# Patient Record
Sex: FEMALE | Race: WHITE | Employment: OTHER | URBAN - METROPOLITAN AREA
[De-identification: names, ages, dates, MRNs, and addresses within clinical notes are randomized per-mention and may not be internally consistent; named-entity substitution may affect disease eponyms.]

---

## 2016-05-23 LAB — PHQ9 SCORE: 3

## 2017-01-27 ENCOUNTER — TRANSFERRED RECORDS (OUTPATIENT)
Dept: HEALTH INFORMATION MANAGEMENT | Facility: CLINIC | Age: 67
End: 2017-01-27

## 2017-02-07 ENCOUNTER — OFFICE VISIT (OUTPATIENT)
Dept: FAMILY MEDICINE | Facility: CLINIC | Age: 67
End: 2017-02-07
Payer: COMMERCIAL

## 2017-02-07 ENCOUNTER — RADIANT APPOINTMENT (OUTPATIENT)
Dept: GENERAL RADIOLOGY | Facility: CLINIC | Age: 67
End: 2017-02-07
Attending: PHYSICIAN ASSISTANT
Payer: COMMERCIAL

## 2017-02-07 VITALS
RESPIRATION RATE: 15 BRPM | BODY MASS INDEX: 24.32 KG/M2 | TEMPERATURE: 97.7 F | HEART RATE: 76 BPM | SYSTOLIC BLOOD PRESSURE: 115 MMHG | HEIGHT: 65 IN | OXYGEN SATURATION: 97 % | DIASTOLIC BLOOD PRESSURE: 70 MMHG | WEIGHT: 146 LBS

## 2017-02-07 DIAGNOSIS — S92.505D CLOSED NONDISPLACED FRACTURE OF PHALANX OF LESSER TOE OF LEFT FOOT WITH ROUTINE HEALING, UNSPECIFIED PHALANX, SUBSEQUENT ENCOUNTER: Primary | ICD-10-CM

## 2017-02-07 PROCEDURE — 73660 X-RAY EXAM OF TOE(S): CPT | Mod: LT

## 2017-02-07 PROCEDURE — 99213 OFFICE O/P EST LOW 20 MIN: CPT | Performed by: PHYSICIAN ASSISTANT

## 2017-02-07 RX ORDER — FLUTICASONE PROPIONATE 50 MCG
SPRAY, SUSPENSION (ML) NASAL
Refills: 11 | COMMUNITY
Start: 2016-06-09 | End: 2017-04-27

## 2017-02-07 ASSESSMENT — ANXIETY QUESTIONNAIRES
IF YOU CHECKED OFF ANY PROBLEMS ON THIS QUESTIONNAIRE, HOW DIFFICULT HAVE THESE PROBLEMS MADE IT FOR YOU TO DO YOUR WORK, TAKE CARE OF THINGS AT HOME, OR GET ALONG WITH OTHER PEOPLE: NOT DIFFICULT AT ALL
GAD7 TOTAL SCORE: 0
6. BECOMING EASILY ANNOYED OR IRRITABLE: NOT AT ALL
1. FEELING NERVOUS, ANXIOUS, OR ON EDGE: NOT AT ALL
2. NOT BEING ABLE TO STOP OR CONTROL WORRYING: NOT AT ALL
3. WORRYING TOO MUCH ABOUT DIFFERENT THINGS: NOT AT ALL
5. BEING SO RESTLESS THAT IT IS HARD TO SIT STILL: NOT AT ALL
7. FEELING AFRAID AS IF SOMETHING AWFUL MIGHT HAPPEN: NOT AT ALL

## 2017-02-07 ASSESSMENT — PATIENT HEALTH QUESTIONNAIRE - PHQ9: 5. POOR APPETITE OR OVEREATING: NOT AT ALL

## 2017-02-07 NOTE — PROGRESS NOTES
SUBJECTIVE:                                                    Angeli Jacobson is a 66 year old female who presents to clinic today for the following health issues:      Patient states she broke her toe about a month ago. Patient states the swelling has not gone down,and is wondering if it is healing. Patient was in [place], when 4 weeks ago, she caught her left little toe on the corner of the coffee table. X-ray showed a nondisplaced fracture of distal phalanx of left 5th toe.   Patient has been doing gloria tape and placed in flat walking shoe.  Toe is still swollen and intermittently painful.         Problem list and histories reviewed & adjusted, as indicated.  Additional history: as documented    Patient Active Problem List   Diagnosis     Pain in joint, multiple sites     MULTINODUL GOITER     Hearing loss     Hyperlipidemia LDL goal <160     Osteoporosis     Cervicalgia     Major depressive disorder, recurrent episode, moderate (H)     Impaired fasting glucose     Advanced directives, counseling/discussion     Lymphedema of left leg     Tubular adenoma     Past Surgical History   Procedure Laterality Date     C appendectomy       Appendectomy     Hc dilation/curettage diag/ther non ob  02/1997     Post menopausal bleeding on HRT, negative     Hc colp cervix/upper vagina  07/1997     Negative     C nonspecific procedure  04/2000     Open Biopsy Left Thigh Liposarcoma     Excision malig lesion>1.25cm  5/2000     Myxoid Liposarcoma       Hc colonoscopy thru stoma, diagnostic  2006 due 2010     Colonoscopy N/A 2/5/2016     Procedure: COMBINED COLONOSCOPY, SINGLE OR MULTIPLE BIOPSY/POLYPECTOMY BY BIOPSY;  Surgeon: Varun Stanley MD, MD;  Location:  GI       Social History   Substance Use Topics     Smoking status: Never Smoker      Smokeless tobacco: Never Used     Alcohol Use: No     Family History   Problem Relation Age of Onset     C.A.D. Father      MI 57     Alcohol/Drug Father      etoh      "Obesity Mother      OSTEOPOROSIS Mother      Lipids Brother      C.A.D. Paternal Grandmother      ascvd     DIABETES Maternal Grandmother      CANCER Maternal Grandmother      C.A.D. Paternal Uncle      Mi  age 48     CANCER Maternal Aunt      pancreatic CA           ROS:  Constitutional, HEENT, cardiovascular, pulmonary, gi and gu systems are negative, except as otherwise noted.    OBJECTIVE:                                                    /70 mmHg  Pulse 76  Temp(Src) 97.7  F (36.5  C) (Oral)  Resp 15  Ht 5' 5.25\" (1.657 m)  Wt 146 lb (66.225 kg)  BMI 24.12 kg/m2  SpO2 97%  LMP 2005  Breastfeeding? No  Body mass index is 24.12 kg/(m^2).  GENERAL APPEARANCE: healthy, alert and no distress  MS: left 5th toe with mild swelling and resolving ecchymosis. LROM secondary to pain and swelling.            ASSESSMENT/PLAN:                                                            1. Closed nondisplaced fracture of phalanx of lesser toe of left foot with routine healing, unspecified phalanx, subsequent encounter  Referral placed. Give it 2 more weeks, if symptoms persist f/u with podiatry.  Trial of NSAIDS.   - XR Toe Left G/E 2 Views  - ORTHO  REFERRAL        Sepideh Burris PA-C, PA-C  [facility]    "

## 2017-02-07 NOTE — NURSING NOTE
"Chief Complaint   Patient presents with     Toe Injury       Initial /70 mmHg  Pulse 76  Temp(Src) 97.7  F (36.5  C) (Oral)  Resp 15  Ht 5' 5.25\" (1.657 m)  Wt 146 lb (66.225 kg)  BMI 24.12 kg/m2  SpO2 97%  LMP 03/18/2005  Breastfeeding? No Estimated body mass index is 24.12 kg/(m^2) as calculated from the following:    Height as of this encounter: 5' 5.25\" (1.657 m).    Weight as of this encounter: 146 lb (66.225 kg).  Medication Reconciliation: complete bp rt arm Giselle Stanley MA  Health Maintenance has been reviewed.       "

## 2017-02-07 NOTE — MR AVS SNAPSHOT
After Visit Summary   2/7/2017    Angeli Jacobson    MRN: [id number]           Patient Information     Date Of Birth          1950        Visit Information        Provider Department      2/7/2017 10:15 AM Sepideh Burris PA-C [facility]        Today's Diagnoses     Closed nondisplaced fracture of phalanx of lesser toe of left foot with routine healing, unspecified phalanx, subsequent encounter    -  1        Follow-ups after your visit        Additional Services     ORTHO  REFERRAL       [facility] is referring you to the Orthopedic  Services at [facility].       The  Representative will assist you in the coordination of your Orthopedic and Musculoskeletal Care as prescribed by your physician.    The  Representative will call you within 1 business day to help schedule your appointment, or you may contact the  Representative at:    All areas ~ [phone]     Type of Referral : [facility]       Timeframe requested: Within 2 weeks    Coverage of these services is subject to the terms and limitations of your health insurance plan.  Please call member services at your health plan with any benefit or coverage questions.      If X-rays, CT or MRI's have been performed, please contact the facility where they were done to arrange for , prior to your scheduled appointment.  Please bring this referral request to your appointment and present it to your specialist.                  Who to contact     If you have questions or need follow up information about today's clinic visit or your schedule please contact [facility] directly at [phone].  Normal or non-critical lab and imaging results will be communicated to you by MyChart, letter or phone within 4 business days after the clinic has received the results. If you do not hear from  "us within 7 days, please contact the clinic through [employer] or phone. If you have a critical or abnormal lab result, we will notify you by phone as soon as possible.  Submit refill requests through [employer] or call your pharmacy and they will forward the refill request to us. Please allow 3 business days for your refill to be completed.          Additional Information About Your Visit        [facility] [facility] Information     [employer] gives you secure access to your electronic health record. If you see a primary care provider, you can also send messages to your care team and make appointments. If you have questions, please call your primary care clinic.  If you do not have a primary care provider, please call [phone] and they will assist you.        Care EveryWhere ID     This is your Care EveryWhere ID. This could be used by other organizations to access your [facility] medical records  [id number]        Your Vitals Were     Pulse Temperature Respirations    76 97.7  F (36.5  C) (Oral) 15    Height BMI (Body Mass Index) Pulse Oximetry    5' 5.25\" (1.657 m) 24.12 kg/m2 97%    Last Period Breastfeeding?       03/18/2005 No        Blood Pressure from Last 3 Encounters:   02/07/17 115/70   11/04/16 112/60   10/27/16 124/80    Weight from Last 3 Encounters:   02/07/17 146 lb (66.225 kg)   11/04/16 146 lb 1.6 oz (66.271 kg)   10/27/16 146 lb 4.8 oz (66.361 kg)              We Performed the Following     ORTHO  REFERRAL     XR Toe Left G/E 2 Views          Today's Medication Changes          These changes are accurate as of: 2/7/17 12:57 PM.  If you have any questions, ask your nurse or doctor.               Stop taking these medicines if you haven't already. Please contact your care team if you have questions.     acetaminophen-codeine 300-30 MG per tablet   Commonly known as:  TYLENOL #3   Stopped by:  Sepideh Burris PA-C           cyclobenzaprine 10 MG tablet   Commonly known as:  FLEXERIL   Stopped by:  " Sepideh Burris PA-C           sulfamethoxazole-trimethoprim 800-160 MG per tablet   Commonly known as:  BACTRIM DS/SEPTRA DS   Stopped by:  Sepideh Burris PA-C                    Primary Care Provider Office Phone # Fax #    Yanelis YELENA Bailey [phone]       [address] [place] [address]        Thank you!     Thank you for choosing [facility]  for your care. Our goal is always to provide you with excellent care. Hearing back from our patients is one way we can continue to improve our services. Please take a few minutes to complete the written survey that you may receive in the mail after your visit with us. Thank you!             Your Updated Medication List - Protect others around you: Learn how to safely use, store and throw away your medicines at www.disposemymeds.org.          This list is accurate as of: 2/7/17 12:57 PM.  Always use your most recent med list.                   Brand Name Dispense Instructions for use    aspirin 81 MG tablet      Take 1 tablet by mouth daily.       atorvastatin 80 MG tablet    LIPITOR    90 tablet    Take 1 tablet (80 mg) by mouth daily       buPROPion 300 MG 24 hr tablet    WELLBUTRIN XL    90 tablet    Take 1 tablet (300 mg) by mouth every morning       CALCIUM 500 + D 500-200 MG-IU Tabs      1 tablet twice daily       FISH OIL PO      Take  by mouth.       fluticasone 50 MCG/ACT spray    FLONASE     SPRAY 2 SPRAY INTO BOTH NOSTRILS ONCE A DAY       Multi-vitamin Tabs tablet   Generic drug:  multivitamin, therapeutic with minerals     30    1 TABLET DAILY       * order for DME     2 Device    Equipment being ordered: lymphedema bandages       * order for DME     1 each    Equipment being ordered: Left Thigh High Compression Stocking, 550 CCL.3       * Notice:  This list has 2 medication(s) that are the same as other medications prescribed for you. Read the directions carefully, and ask  your doctor or other care provider to review them with you.

## 2017-02-08 ASSESSMENT — ANXIETY QUESTIONNAIRES: GAD7 TOTAL SCORE: 0

## 2017-02-08 ASSESSMENT — PATIENT HEALTH QUESTIONNAIRE - PHQ9: SUM OF ALL RESPONSES TO PHQ QUESTIONS 1-9: 3

## 2017-03-20 ENCOUNTER — MYC MEDICAL ADVICE (OUTPATIENT)
Dept: FAMILY MEDICINE | Facility: CLINIC | Age: 67
End: 2017-03-20

## 2017-03-24 DIAGNOSIS — E78.5 HYPERLIPIDEMIA LDL GOAL <160: ICD-10-CM

## 2017-03-24 LAB
CHOLEST SERPL-MCNC: 265 MG/DL
HDLC SERPL-MCNC: 77 MG/DL
LDLC SERPL CALC-MCNC: 172 MG/DL
NONHDLC SERPL-MCNC: 188 MG/DL
TRIGL SERPL-MCNC: 78 MG/DL

## 2017-03-24 PROCEDURE — 80061 LIPID PANEL: CPT | Performed by: FAMILY MEDICINE

## 2017-03-24 PROCEDURE — 36415 COLL VENOUS BLD VENIPUNCTURE: CPT | Performed by: FAMILY MEDICINE

## 2017-04-24 ENCOUNTER — MYC MEDICAL ADVICE (OUTPATIENT)
Dept: FAMILY MEDICINE | Facility: CLINIC | Age: 67
End: 2017-04-24

## 2017-04-27 ENCOUNTER — OFFICE VISIT (OUTPATIENT)
Dept: FAMILY MEDICINE | Facility: CLINIC | Age: 67
End: 2017-04-27
Payer: COMMERCIAL

## 2017-04-27 VITALS
HEART RATE: 67 BPM | SYSTOLIC BLOOD PRESSURE: 123 MMHG | HEIGHT: 65 IN | BODY MASS INDEX: 24.66 KG/M2 | OXYGEN SATURATION: 97 % | DIASTOLIC BLOOD PRESSURE: 73 MMHG | WEIGHT: 148 LBS | RESPIRATION RATE: 16 BRPM

## 2017-04-27 DIAGNOSIS — R39.15 URINARY URGENCY: Primary | ICD-10-CM

## 2017-04-27 LAB
ALBUMIN UR-MCNC: NEGATIVE MG/DL
APPEARANCE UR: CLEAR
BACTERIA #/AREA URNS HPF: ABNORMAL /HPF
BILIRUB UR QL STRIP: NEGATIVE
COLOR UR AUTO: YELLOW
GLUCOSE UR STRIP-MCNC: NEGATIVE MG/DL
HGB UR QL STRIP: ABNORMAL
KETONES UR STRIP-MCNC: NEGATIVE MG/DL
LEUKOCYTE ESTERASE UR QL STRIP: ABNORMAL
NITRATE UR QL: NEGATIVE
PH UR STRIP: 6.5 PH (ref 5–7)
RBC #/AREA URNS AUTO: ABNORMAL /HPF (ref 0–2)
SP GR UR STRIP: 1.01 (ref 1–1.03)
URN SPEC COLLECT METH UR: ABNORMAL
UROBILINOGEN UR STRIP-ACNC: 0.2 EU/DL (ref 0.2–1)
WBC #/AREA URNS AUTO: ABNORMAL /HPF (ref 0–2)

## 2017-04-27 PROCEDURE — 99213 OFFICE O/P EST LOW 20 MIN: CPT | Performed by: PHYSICIAN ASSISTANT

## 2017-04-27 PROCEDURE — 81001 URINALYSIS AUTO W/SCOPE: CPT | Performed by: PHYSICIAN ASSISTANT

## 2017-04-27 RX ORDER — SULFAMETHOXAZOLE/TRIMETHOPRIM 800-160 MG
1 TABLET ORAL 2 TIMES DAILY
Qty: 6 TABLET | Refills: 0 | Status: SHIPPED | OUTPATIENT
Start: 2017-04-27 | End: 2017-04-30

## 2017-04-27 NOTE — NURSING NOTE
"Chief Complaint   Patient presents with     UTI     Urinary Frequency       Initial /73 (BP Location: Right arm, Patient Position: Chair, Cuff Size: Adult Regular)  Pulse 67  Resp 16  Ht 5' 5.25\" (1.657 m)  Wt 148 lb (67.1 kg)  LMP 03/18/2005  SpO2 97%  Breastfeeding? No  BMI 24.44 kg/m2 Estimated body mass index is 24.44 kg/(m^2) as calculated from the following:    Height as of this encounter: 5' 5.25\" (1.657 m).    Weight as of this encounter: 148 lb (67.1 kg).  Medication Reconciliation: complete Giselle Stanley MA  Health Maintenance has been reviewed.       "

## 2017-04-27 NOTE — MR AVS SNAPSHOT
After Visit Summary   4/27/2017    Angeli Jacobson    MRN: [id number]           Patient Information     Date Of Birth          1950        Visit Information        Provider Department      4/27/2017 7:45 AM Sepideh Burris PA-C [facility]        Today's Diagnoses     Urinary urgency    -  1       Follow-ups after your visit        Additional Services     UROLOGY ADULT REFERRAL       Your provider has referred you to: [facility] [phone]   [email or web address]    Please be aware that coverage of these services is subject to the terms and limitations of your health insurance plan.  Call member services at your health plan with any benefit or coverage questions.      Please bring the following with you to your appointment:    (1) Any X-Rays, CTs or MRIs which have been performed.  Contact the facility where they were done to arrange for  prior to your scheduled appointment.    (2) List of current medications  (3) This referral request   (4) Any documents/labs given to you for this referral                  Who to contact     If you have questions or need follow up information about today's clinic visit or your schedule please contact [facility] directly at [phone].  Normal or non-critical lab and imaging results will be communicated to you by MyChart, letter or phone within 4 business days after the clinic has received the results. If you do not hear from us within 7 days, please contact the clinic through MyChart or phone. If you have a critical or abnormal lab result, we will notify you by phone as soon as possible.  Submit refill requests through [email or web address] or call your pharmacy and they will forward the refill request to us. Please allow 3 business days for your refill to be completed.          Additional Information About Your Visit        MyChart Information   "   [employer] gives you secure access to your electronic health record. If you see a primary care provider, you can also send messages to your care team and make appointments. If you have questions, please call your primary care clinic.  If you do not have a primary care provider, please call [phone] and they will assist you.        Care EveryWhere ID     This is your Care EveryWhere ID. This could be used by other organizations to access your [place] medical records  [id number]        Your Vitals Were     Pulse Respirations Height Last Period Pulse Oximetry Breastfeeding?    67 16 5' 5.25\" (1.657 m) 03/18/2005 97% No    BMI (Body Mass Index)                   24.44 kg/m2            Blood Pressure from Last 3 Encounters:   04/27/17 123/73   02/07/17 115/70   11/04/16 112/60    Weight from Last 3 Encounters:   04/27/17 148 lb (67.1 kg)   02/07/17 146 lb (66.2 kg)   11/04/16 146 lb 1.6 oz (66.3 kg)              We Performed the Following     *UA reflex to Microscopic and Culture ([place] and [facility] (except [place] and [place])     UROLOGY ADULT REFERRAL          Today's Medication Changes          These changes are accurate as of: 4/27/17  7:57 AM.  If you have any questions, ask your nurse or doctor.               Stop taking these medicines if you haven't already. Please contact your care team if you have questions.     fluticasone 50 MCG/ACT spray   Commonly known as:  FLONASE   Stopped by:  Sepideh Burris PA-C                    Primary Care Provider Office Phone # Fax #    Sepideh Burris PA-C [phone]       [facility] [id number] [place] [address]        Thank you!     Thank you for choosing [facility]  for your care. Our goal is always to provide you with excellent care. Hearing back from our patients is one way we can continue to improve our services. Please take a few minutes to complete the written survey that you " may receive in the mail after your visit with us. Thank you!             Your Updated Medication List - Protect others around you: Learn how to safely use, store and throw away your medicines at www.disposemymeds.org.          This list is accurate as of: 4/27/17  7:57 AM.  Always use your most recent med list.                   Brand Name Dispense Instructions for use    aspirin 81 MG tablet      Take 1 tablet by mouth daily.       atorvastatin 80 MG tablet    LIPITOR    90 tablet    Take 1 tablet (80 mg) by mouth daily       buPROPion 300 MG 24 hr tablet    WELLBUTRIN XL    90 tablet    Take 1 tablet (300 mg) by mouth every morning       CALCIUM 500 + D 500-200 MG-IU Tabs      1 tablet twice daily       FISH OIL PO      Take  by mouth.       Multi-vitamin Tabs tablet   Generic drug:  multivitamin, therapeutic with minerals     30    1 TABLET DAILY       * order for DME     2 Device    Equipment being ordered: lymphedema bandages       * order for DME     1 each    Equipment being ordered: Left Thigh High Compression Stocking, 550 CCL.3       * Notice:  This list has 2 medication(s) that are the same as other medications prescribed for you. Read the directions carefully, and ask your doctor or other care provider to review them with you.

## 2017-04-27 NOTE — PROGRESS NOTES
"  SUBJECTIVE:                                                    Angeli Jacobson is a 67 year old female who presents to clinic today for the following health issues:      URINARY TRACT SYMPTOMS     Onset: on and off this week    Description:   Painful urination (Dysuria): YES- pressure  Blood in urine (Hematuria): no   Delay in urine (Hesitency): no     Intensity: moderate, severe    Progression of Symptoms:  same and intermittent    Accompanying Signs & Symptoms:  Fever/chills: no   Flank pain no   Nausea and vomiting: no   Any vaginal symptoms: none  Abdominal/Pelvic Pain: no    History:   History of frequent UTI's: YES  History of kidney stones: no   Sexually Active: YES  Possibility of pregnancy: No    Precipitating factors:            Therapies Tried and outcome: nothing         Patient has noticed more urinary urgency for the past few months.   States symptoms are \"very consistent\".     Problem list and histories reviewed & adjusted, as indicated.  Additional history: as documented    Patient Active Problem List   Diagnosis     Pain in joint, multiple sites     MULTINODUL GOITER     Hearing loss     Hyperlipidemia LDL goal <160     Osteoporosis     Cervicalgia     Major depressive disorder, recurrent episode, moderate (H)     Impaired fasting glucose     Advanced directives, counseling/discussion     Lymphedema of left leg     Tubular adenoma     Past Surgical History:   Procedure Laterality Date     C APPENDECTOMY      Appendectomy     C NONSPECIFIC PROCEDURE  04/2000    Open Biopsy Left Thigh Liposarcoma     COLONOSCOPY N/A 2/5/2016    Procedure: COMBINED COLONOSCOPY, SINGLE OR MULTIPLE BIOPSY/POLYPECTOMY BY BIOPSY;  Surgeon: Varun Satnley MD, MD;  Location: [facility]     EXCISION MALIG LESION>1.25CM  5/2000    Myxoid Liposarcoma       HC COLONOSCOPY THRU STOMA, DIAGNOSTIC  2006    due 2010     HC COLP CERVIX/UPPER VAGINA  07/1997    Negative     HC DILATION/CURETTAGE DIAG/THER NON OB  02/1997    Post " "menopausal bleeding on HRT, negative       Social History   Substance Use Topics     Smoking status: Never Smoker     Smokeless tobacco: Never Used     Alcohol use No     Family History   Problem Relation Age of Onset     C.A.D. Father      MI 57     Alcohol/Drug Father      etoh     Obesity Mother      OSTEOPOROSIS Mother      Lipids Brother      C.A.D. Paternal Grandmother      ascvd     DIABETES Maternal Grandmother      CANCER Maternal Grandmother      C.A.D. Paternal Uncle      Mi  age 48     CANCER Maternal Aunt      pancreatic CA           Reviewed and updated as needed this visit by clinical staff  Tobacco  Allergies  Med Hx  Surg Hx  Fam Hx  Soc Hx      Reviewed and updated as needed this visit by Provider         ROS:  Constitutional, HEENT, cardiovascular, pulmonary, gi and gu systems are negative, except as otherwise noted.    OBJECTIVE:                                                    /73 (BP Location: Right arm, Patient Position: Chair, Cuff Size: Adult Regular)  Pulse 67  Resp 16  Ht 5' 5.25\" (1.657 m)  Wt 148 lb (67.1 kg)  LMP 2005  SpO2 97%  Breastfeeding? No  BMI 24.44 kg/m2  Body mass index is 24.44 kg/(m^2).  GENERAL APPEARANCE: healthy, alert and no distress  RESP: lungs clear to auscultation - no rales, rhonchi or wheezes  CV: regular rates and rhythm, normal S1 S2, no S3 or S4 and no murmur, click or rub  ABDOMEN: soft, nontender, without hepatosplenomegaly or masses and bowel sounds normal         ASSESSMENT/PLAN:                                                            1. Urinary urgency  Patient informed by phone of results.  Will treat based on symptoms and then pt will f/u with urology in future.   - UROLOGY ADULT REFERRAL  - *UA reflex to Microscopic and Culture ([facility] (except [place] and [place]); Future  - *UA reflex to Microscopic and Culture ([facility] (except [place] and [place])  - Urine " Microscopic  - sulfamethoxazole-trimethoprim (BACTRIM DS/SEPTRA DS) 800-160 MG per tablet; Take 1 tablet by mouth 2 times daily for 3 days  Dispense: 6 tablet; Refill: 0        Sepideh Burris PA-C, COURTNEY  [facility]

## 2017-04-28 ENCOUNTER — TELEPHONE (OUTPATIENT)
Dept: FAMILY MEDICINE | Facility: CLINIC | Age: 67
End: 2017-04-28

## 2017-04-28 NOTE — TELEPHONE ENCOUNTER
Received 1 page fax for Medical Supplies/Equipment for Sepideh Burris to complete. Form in the in-basket at 's desk.

## 2017-05-02 ENCOUNTER — MYC MEDICAL ADVICE (OUTPATIENT)
Dept: FAMILY MEDICINE | Facility: CLINIC | Age: 67
End: 2017-05-02

## 2017-05-18 ENCOUNTER — TELEPHONE (OUTPATIENT)
Dept: UROLOGY | Facility: CLINIC | Age: 67
End: 2017-05-18

## 2017-05-18 ENCOUNTER — OFFICE VISIT (OUTPATIENT)
Dept: UROLOGY | Facility: CLINIC | Age: 67
End: 2017-05-18
Payer: COMMERCIAL

## 2017-05-18 DIAGNOSIS — R35.0 URINARY FREQUENCY: Primary | ICD-10-CM

## 2017-05-18 DIAGNOSIS — N95.2 ATROPHIC VAGINITIS: ICD-10-CM

## 2017-05-18 LAB
ALBUMIN UR-MCNC: NEGATIVE MG/DL
APPEARANCE UR: CLEAR
BILIRUB UR QL STRIP: NEGATIVE
COLOR UR AUTO: YELLOW
GLUCOSE UR STRIP-MCNC: NEGATIVE MG/DL
HGB UR QL STRIP: NEGATIVE
KETONES UR STRIP-MCNC: NEGATIVE MG/DL
LEUKOCYTE ESTERASE UR QL STRIP: NEGATIVE
NITRATE UR QL: NEGATIVE
PH UR STRIP: 5 PH (ref 5–7)
SP GR UR STRIP: 1.01 (ref 1–1.03)
URN SPEC COLLECT METH UR: NORMAL
UROBILINOGEN UR STRIP-ACNC: 0.2 EU/DL (ref 0.2–1)

## 2017-05-18 PROCEDURE — 52000 CYSTOURETHROSCOPY: CPT | Performed by: UROLOGY

## 2017-05-18 PROCEDURE — 99205 OFFICE O/P NEW HI 60 MIN: CPT | Mod: 25 | Performed by: UROLOGY

## 2017-05-18 PROCEDURE — 81003 URINALYSIS AUTO W/O SCOPE: CPT | Mod: QW | Performed by: UROLOGY

## 2017-05-18 RX ORDER — ESTRADIOL 0.1 MG/G
2 CREAM VAGINAL
Qty: 0.5 G | Refills: 3 | Status: SHIPPED | OUTPATIENT
Start: 2017-05-19 | End: 2018-01-02

## 2017-05-18 RX ORDER — TOLTERODINE 4 MG/1
4 CAPSULE, EXTENDED RELEASE ORAL DAILY
Qty: 30 CAPSULE | Refills: 1 | Status: SHIPPED | OUTPATIENT
Start: 2017-05-18 | End: 2017-06-26

## 2017-05-18 NOTE — MR AVS SNAPSHOT
After Visit Summary   5/18/2017    Angeli Jacobson    MRN: [id number]           Patient Information     Date Of Birth          1950        Visit Information        Provider Department      5/18/2017 9:00 AM Ramez Beal MD [facility]        Today's Diagnoses     Urinary frequency    -  1    Atrophic vaginitis           Follow-ups after your visit        Follow-up notes from your care team     Return in about 1 month (around 6/18/2017).      Your next 10 appointments already scheduled     Jun 21, 2017  9:00 AM CDT   Return Visit with Ramez Beal MD   [facility] ([facility] [place])    [address] [address] [phone]              Who to contact     If you have questions or need follow up information about today's clinic visit or your schedule please contact [facility] directly at [phone].  Normal or non-critical lab and imaging results will be communicated to you by [employer], letter or phone within 4 business days after the clinic has received the results. If you do not hear from us within 7 days, please contact the clinic through [employer] or phone. If you have a critical or abnormal lab result, we will notify you by phone as soon as possible.  Submit refill requests through [employer] or call your pharmacy and they will forward the refill request to us. Please allow 3 business days for your refill to be completed.          Additional Information About Your Visit        Brainscapehart Information     [employer] gives you secure access to your electronic health record. If you see a primary care provider, you can also send messages to your care team and make appointments. If you have questions, please call your primary care clinic.  If you do not have a primary care provider, please call [phone] and they  will assist you.        Care EveryWhere ID     This is your Care EveryWhere ID. This could be used by other organizations to access your [place] medical records  [id number]        Your Vitals Were     Last Period                   03/18/2005            Blood Pressure from Last 3 Encounters:   04/27/17 123/73   02/07/17 115/70   11/04/16 112/60    Weight from Last 3 Encounters:   04/27/17 148 lb (67.1 kg)   02/07/17 146 lb (66.2 kg)   11/04/16 146 lb 1.6 oz (66.3 kg)              We Performed the Following     CYSTOURETHROSCOPY     UA without Microscopic          Today's Medication Changes          These changes are accurate as of: 5/18/17 10:06 AM.  If you have any questions, ask your nurse or doctor.               Start taking these medicines.        Dose/Directions    estradiol 0.1 MG/GM cream   Commonly known as:  ESTRACE VAGINAL   Used for:  Atrophic vaginitis   Started by:  Ramez Beal MD        Dose:  2 g   Start taking on:  5/19/2017   Place 2 g vaginally three times a week   Quantity:  0.5 g   Refills:  3       tolterodine 4 MG 24 hr capsule   Commonly known as:  DETROL LA   Used for:  Urinary frequency   Started by:  Ramez Beal MD        Dose:  4 mg   Take 1 capsule (4 mg) by mouth daily   Quantity:  30 capsule   Refills:  1            Where to get your medicines      These medications were sent to [place] [address] [place], [place] - [address]  [address] [facility] [address]     Phone:  [phone]     estradiol 0.1 MG/GM cream    tolterodine 4 MG 24 hr capsule                Primary Care Provider Office Phone # Fax #    Sepideh Burris PA-C [phone]       [address] [address]        Thank you!     Thank you for choosing [facility]  for your care. Our goal is always to provide you with excellent care. Hearing back from our patients is one way we can continue to  improve our services. Please take a few minutes to complete the written survey that you may receive in the mail after your visit with us. Thank you!             Your Updated Medication List - Protect others around you: Learn how to safely use, store and throw away your medicines at www.disposemymeds.org.          This list is accurate as of: 5/18/17 10:06 AM.  Always use your most recent med list.                   Brand Name Dispense Instructions for use    aspirin 81 MG tablet      Take 1 tablet by mouth daily.       atorvastatin 80 MG tablet    LIPITOR    90 tablet    Take 1 tablet (80 mg) by mouth daily       buPROPion 300 MG 24 hr tablet    WELLBUTRIN XL    90 tablet    Take 1 tablet (300 mg) by mouth every morning       CALCIUM 500 + D 500-200 MG-IU Tabs      1 tablet twice daily       estradiol 0.1 MG/GM cream   Start taking on:  5/19/2017    ESTRACE VAGINAL    0.5 g    Place 2 g vaginally three times a week       FISH OIL PO      Take  by mouth.       Multi-vitamin Tabs tablet   Generic drug:  multivitamin, therapeutic with minerals     30    1 TABLET DAILY       * order for DME     2 Device    Equipment being ordered: lymphedema bandages       * order for DME     1 each    Equipment being ordered: Left Thigh High Compression Stocking, 550 CCL.3       tolterodine 4 MG 24 hr capsule    DETROL LA    30 capsule    Take 1 capsule (4 mg) by mouth daily       * Notice:  This list has 2 medication(s) that are the same as other medications prescribed for you. Read the directions carefully, and ask your doctor or other care provider to review them with you.

## 2017-05-18 NOTE — PROGRESS NOTES
Angeli Jacobson is a 67 year old female seen in consultation for urgency, urge incont. Consult from Sepideh Burris.    Pt states 5-6 mos of urinary urgency, some urge incont, wears one med pad per day, not at nite. Denies MODE, insensate loss. Voids q 2 hrs during the day. Noc x 1, dry at nite, no pad.    Denies dysuria, gross hematuria, signif UTI's.    Seen by  about 10 yrs ago, (no record in our chart), underwent dilation for rec UTI's, no additional rx or subsequent  intervention. Specifically denies bladder surgery, use of bladder meds.     Hx 3 vag deliveries, no hyster, not on HRT.    Some constipation, modest fiber.     Drinks1 coffee, 6-8 Ambler per day. (Did not complete voiding diary).        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    CHRONIC LEFT THIGH LYMPHEDEMA     Nontoxic multinodular goiter 2005    needs yearly US     Osteoporosis, unspecified 2001     Other lymphedema 2000    left thigh, gets regular PT for this     SHINGLES 2001     Sprain of lumbosacral (joint) (ligament) 1995    right     Unspecified hearing loss 1998    chronic tinnitus     Unspecified tinnitus 1998       Past Surgical History:   Procedure Laterality Date     C APPENDECTOMY      Appendectomy     C NONSPECIFIC PROCEDURE  04/2000    Open Biopsy Left Thigh Liposarcoma     COLONOSCOPY N/A 2/5/2016    Procedure: COMBINED COLONOSCOPY, SINGLE OR MULTIPLE BIOPSY/POLYPECTOMY BY BIOPSY;  Surgeon: Varun Stanley MD, MD;  Location:  GI     [facility]  LESION>1.25CM  5/2000    Myxoid Liposarcoma       HC COLONOSCOPY THRU STOMA, DIAGNOSTIC  2006    due 2010     HC COLP CERVIX/UPPER VAGINA  07/1997    Negative     HC DILATION/CURETTAGE DIAG/THER NON OB  02/1997    Post menopausal bleeding on HRT, negative       Social History     Social History     Marital status:      Spouse name: Chris     Number of children: 3     Years of education: 14     Occupational History     at home       None      Social History Main Topics     Smoking status: Never Smoker     Smokeless tobacco: Never Used     Alcohol use No     Drug use: No     Sexual activity: Yes     Partners: Male     Birth control/ protection: Condom     Other Topics Concern     Parent/Sibling W/ Cabg, Mi Or Angioplasty Before 65f 55m? No     Social History Narrative       Current Outpatient Prescriptions   Medication Sig Dispense Refill     buPROPion (WELLBUTRIN XL) 300 MG 24 hr tablet Take 1 tablet (300 mg) by mouth every morning 90 tablet 3     atorvastatin (LIPITOR) 80 MG tablet Take 1 tablet (80 mg) by mouth daily 90 tablet 3     aspirin 81 MG tablet Take 1 tablet by mouth daily.  3     Omega-3 Fatty Acids (FISH OIL PO) Take  by mouth.       MULTI-VITAMIN OR TABS 1 TABLET DAILY 30 prn     CALCIUM 500 + D 500-200 MG-IU OR TABS 1 tablet twice daily       order for DME Equipment being ordered: Left Thigh High Compression Stocking, 550 CCL.3 1 each 99     ORDER FOR DME Equipment being ordered: lymphedema bandages 2 Device 1       Physical Exam:    GENL: NAD.    ABD: Soft, non-tender, no masses.    EG: Moderately-estrogenized, no masses.    VAGINA: Moderately-estrogenized, small urethral caruncle.    BN HYPERMOBILITY: Mild.    CYSTOCELE: Grade 2+.    APICAL PROLAPSE: Mild.    RECTOCELE: Mild.    BIMANUAL: No mass or tenderness.    Cysto:    (Informed consent obtained. Pause for cause performed)   Sterile prep.    17 Fr scope inserted through urethra. Systematic examination w 70 degree lens.   PVR: 5  cc   MUCOSA: Normal without lesion   ORIFICES: Normal location and morphology   CAPACITY: 725 cc; no pain with filling   Scope withdrawn without untoward effect.    Valsalva:   Mild hypermobility, no leakage noted.    (Pt tolerated procedure without difficulty).      Component      Latest Ref Rng & Units 10/15/2013   Color Urine          Appearance Urine          Glucose Urine      NEG mg/dL    Bilirubin Urine      NEG    Ketones Urine      NEG mg/dL    Specific Gravity Urine      1.003 - 1.035    Blood Urine      NEG    pH Urine      5.0 - 7.0 pH    Protein Albumin Urine      NEG mg/dL    Urobilinogen Urine      0.2 - 1.0 EU/dL    Nitrite Urine      NEG    Leukocyte Esterase Urine      NEG    Source          WBC Urine      0 - 2 /HPF    RBC Urine      0 - 2 /HPF    Squamous Epithelial /LPF Urine      FEW /LPF    Bacteria Urine      NEG /HPF    Specimen Description       Midstream Urine   Culture Micro       10,000 to 50,000 colonies/mL Mixed gram positive gonsalo . . .   Micro Report Status       FINAL 10/16/2013   Organism:            Component      Latest Ref Rng & Units 10/31/2014   Color Urine       Yellow   Appearance Urine       Clear   Glucose Urine      NEG mg/dL Negative   Bilirubin Urine      NEG Negative   Ketones Urine      NEG mg/dL Negative   Specific Gravity Urine      1.003 - 1.035 1.025   Blood Urine      NEG Negative   pH Urine      5.0 - 7.0 pH 5.5   Protein Albumin Urine      NEG mg/dL Negative   Urobilinogen Urine      0.2 - 1.0 EU/dL 0.2   Nitrite Urine      NEG Negative   Leukocyte Esterase Urine      NEG Moderate (A)   Source       Midstream Urine   WBC Urine      0 - 2 /HPF 10-25 (A)   RBC Urine      0 - 2 /HPF O - 2   Squamous Epithelial /LPF Urine      FEW /LPF Few   Bacteria Urine      NEG /HPF Moderate (A)   Specimen Description       Midstream Urine   Culture Micro       >100,000 colonies/mL Enterococcus species (A)   Micro Report Status       FINAL 11/02/2014   Organism:       >100,000  colonies/mL Enterococcus species     Component      Latest Ref Rng & Units 10/27/2016   Color Urine       Yellow   Appearance Urine       Clear   Glucose Urine      NEG mg/dL Negative   Bilirubin Urine      NEG Negative   Ketones Urine      NEG mg/dL Trace (A)   Specific Gravity Urine      1.003 - 1.035 1.025   Blood Urine      NEG Large (A)   pH Urine      5.0 - 7.0 pH 5.5   Protein Albumin Urine      NEG mg/dL 100 (A)   Urobilinogen Urine      0.2 - 1.0 EU/dL 0.2   Nitrite Urine      NEG Positive (A)   Leukocyte Esterase Urine      NEG Large (A)   Source       Midstream Urine   WBC Urine      0 - 2 /HPF >100 (A)   RBC Urine      0 - 2 /HPF 25-50 (A)   Squamous Epithelial /LPF Urine      FEW /LPF    Bacteria Urine      NEG /HPF Many (A)   Specimen Description       Midstream Urine   Culture Micro       >100,000 colonies/mL Escherichia coli (A)   Micro Report Status       FINAL 10/29/2016   Organism:       >100,000 colonies/mL Escherichia coli     Component      Latest Ref Rng & Units 4/27/2017   Color Urine       Yellow   Appearance Urine       Clear   Glucose Urine      NEG mg/dL Negative   Bilirubin Urine      NEG Negative   Ketones Urine      NEG mg/dL Negative   Specific Gravity Urine      1.003 - 1.035 1.010   Blood Urine      NEG Trace (A)   pH Urine      5.0 - 7.0 pH 6.5   Protein Albumin Urine      NEG mg/dL Negative   Urobilinogen Urine      0.2 - 1.0 EU/dL 0.2   Nitrite Urine      NEG Negative   Leukocyte Esterase Urine      NEG Small (A)   Source       Midstream Urine   WBC Urine      0 - 2 /HPF 2-5 (A)   RBC Urine      0 - 2 /HPF O - 2   Squamous Epithelial /LPF Urine      FEW /LPF    Bacteria Urine      NEG /HPF Few (A)   Specimen Description          Culture Micro          Micro Report Status          Organism:                Results for orders placed or performed in visit on 05/18/17   UA without Microscopic   Result Value Ref Range    Color Urine Yellow     Appearance Urine Clear     Glucose Urine  Negative NEG mg/dL    Bilirubin Urine Negative NEG    Ketones Urine Negative NEG mg/dL    Specific Gravity Urine 1.015 1.003 - 1.035    Blood Urine Negative NEG    pH Urine 5.0 5.0 - 7.0 pH    Protein Albumin Urine Negative NEG mg/dL    Urobilinogen Urine 0.2 0.2 - 1.0 EU/dL    Nitrite Urine Negative NEG    Leukocyte Esterase Urine Negative NEG    Source Midstream Urine          IMP:  1. OAB wet  2. Moderate anterior prolapse w/o signif MODE  3. Moderate vaginal estrogen effect  4. Some constipation      PLAN:  1. Discussed situation with patient in detail.  2. Watch fluid consumption   3. Encourage fiber for breakfast  4. Consider topical HRT; discussed in detail rationale, mech of action, application, etc; pt elects trial  5. Consider Detrol LA4 trial; discussed rationale, mech of action, side effects, etc;  6. RTC 1 mo to review progress; may come to surgical reconstruction at some point; introduced concept today  7. 60 minutes spent with patient, more than 50% in counseling and coordination of care for OAB, which did not include time spent for the procedure.  8. Copy C [place]

## 2017-05-18 NOTE — TELEPHONE ENCOUNTER
Pt seen this morning in clinic and was to start on Tolterodine. Pt called stating this is not covered under her Ins. Ins covers per pt, Oxybutynin, Oxybutynin ER, Toviaz,and Oxytrol. List shown to Dr Beal,, per verbal order of Dr Beal new script called into [facility] @[phone] for Toviaz 8mg one tab po daily #30, one refill. Called pt and left message on VM regarding the new medication. Pt to RTC in one month.

## 2017-05-25 ENCOUNTER — THERAPY VISIT (OUTPATIENT)
Dept: PHYSICAL THERAPY | Facility: CLINIC | Age: 67
End: 2017-05-25
Payer: COMMERCIAL

## 2017-05-25 DIAGNOSIS — M25.562 CHRONIC PAIN OF LEFT KNEE: Primary | ICD-10-CM

## 2017-05-25 DIAGNOSIS — G89.29 CHRONIC PAIN OF LEFT KNEE: Primary | ICD-10-CM

## 2017-05-25 PROCEDURE — 97140 MANUAL THERAPY 1/> REGIONS: CPT | Mod: GP | Performed by: PHYSICAL THERAPIST

## 2017-05-25 PROCEDURE — 97110 THERAPEUTIC EXERCISES: CPT | Mod: GP | Performed by: PHYSICAL THERAPIST

## 2017-05-25 PROCEDURE — 97161 PT EVAL LOW COMPLEX 20 MIN: CPT | Mod: GP | Performed by: PHYSICAL THERAPIST

## 2017-05-25 ASSESSMENT — ACTIVITIES OF DAILY LIVING (ADL)
GO DOWN STAIRS: ACTIVITY IS NOT DIFFICULT
WEAKNESS: I HAVE THE SYMPTOM BUT IT DOES NOT AFFECT MY ACTIVITY
GO UP STAIRS: ACTIVITY IS NOT DIFFICULT
GIVING WAY, BUCKLING OR SHIFTING OF KNEE: I DO NOT HAVE THE SYMPTOM
KNEE_ACTIVITY_OF_DAILY_LIVING_SCORE: 90.77
SWELLING: I DO NOT HAVE THE SYMPTOM
HOW_WOULD_YOU_RATE_THE_CURRENT_FUNCTION_OF_YOUR_KNEE_DURING_YOUR_USUAL_DAILY_ACTIVITIES_ON_A_SCALE_FROM_0_TO_100_WITH_100_BEING_YOUR_LEVEL_OF_KNEE_FUNCTION_PRIOR_TO_YOUR_INJURY_AND_0_BEING_THE_INABILITY_TO_PERFORM_ANY_OF_YOUR_USUAL_DAILY_ACTIVITIES?: 99
SIT WITH YOUR KNEE BENT: ACTIVITY IS NOT DIFFICULT
KNEEL ON THE FRONT OF YOUR KNEE: NOT ANSWERED
STIFFNESS: THE SYMPTOM AFFECTS MY ACTIVITY SLIGHTLY
STAND: ACTIVITY IS NOT DIFFICULT
SQUAT: ACTIVITY IS SOMEWHAT DIFFICULT
AS_A_RESULT_OF_YOUR_KNEE_INJURY,_HOW_WOULD_YOU_RATE_YOUR_CURRENT_LEVEL_OF_DAILY_ACTIVITY?: NEARLY NORMAL
PAIN: I DO NOT HAVE THE SYMPTOM
RAW_SCORE: 63.54
KNEE_ACTIVITY_OF_DAILY_LIVING_SUM: 59
LIMPING: I DO NOT HAVE THE SYMPTOM
HOW_WOULD_YOU_RATE_THE_OVERALL_FUNCTION_OF_YOUR_KNEE_DURING_YOUR_USUAL_DAILY_ACTIVITIES?: NEARLY NORMAL
RISE FROM A CHAIR: ACTIVITY IS MINIMALLY DIFFICULT
WALK: ACTIVITY IS NOT DIFFICULT

## 2017-05-25 NOTE — MR AVS SNAPSHOT
After Visit Summary   5/25/2017    Angeli Jacobson    MRN: [id number]           Patient Information     Date Of Birth          1950        Visit Information        Provider Department      5/25/2017 3:10 PM Isaiah Gonzalez PT [place] [facility] Physical Therapy        Today's Diagnoses     Chronic pain of left knee    -  1       Follow-ups after your visit        Your next 10 appointments already scheduled     Jun 26, 2017 11:45 AM CDT   Return Visit with Ramez Beal MD   [facility] for [facility] ([facility] [place])    [address] [place] [address]  [facility] [address] [phone]              Who to contact     If you have questions or need follow up information about today's clinic visit or your schedule please contact [place] [facility] PHYSICAL THERAPY directly at [phone].  Normal or non-critical lab and imaging results will be communicated to you by MyChart, letter or phone within 4 business days after the clinic has received the results. If you do not hear from us within 7 days, please contact the clinic through [employer] or phone. If you have a critical or abnormal lab result, we will notify you by phone as soon as possible.  Submit refill requests through [employer] or call your pharmacy and they will forward the refill request to us. Please allow 3 business days for your refill to be completed.          Additional Information About Your Visit        MyChart Information     [employer] gives you secure access to your electronic health record. If you see a primary care provider, you can also send messages to your care team and make appointments. If you have questions, please call your primary care clinic.  If you do not have a primary care provider, please call [phone] and they will assist you.        Care EveryWhere ID     This is your Care EveryWhere ID.  This could be used by other organizations to access your [place] medical records  [id number]        Your Vitals Were     Last Period                   03/18/2005            Blood Pressure from Last 3 Encounters:   04/27/17 123/73   02/07/17 115/70   11/04/16 112/60    Weight from Last 3 Encounters:   04/27/17 67.1 kg (148 lb)   02/07/17 66.2 kg (146 lb)   11/04/16 66.3 kg (146 lb 1.6 oz)              We Performed the Following     HC PT EVAL, LOW COMPLEXITY     ALVARO INITIAL EVAL REPORT     MANUAL THER TECH,1+REGIONS,EA 15 MIN     THERAPEUTIC EXERCISES        Primary Care Provider Office Phone # Fax #    Sepideh Gloria Burris PA-C [phone]       [address] [address]        Thank you!     Thank you for choosing [place] [facility]IC[facility] PHYSICAL THERAPY  for your care. Our goal is always to provide you with excellent care. Hearing back from our patients is one way we can continue to improve our services. Please take a few minutes to complete the written survey that you may receive in the mail after your visit with us. Thank you!             Your Updated Medication List - Protect others around you: Learn how to safely use, store and throw away your medicines at www.disposemymeds.org.          This list is accurate as of: 5/25/17 11:59 PM.  Always use your most recent med list.                   Brand Name Dispense Instructions for use    aspirin 81 MG tablet      Take 1 tablet by mouth daily.       atorvastatin 80 MG tablet    LIPITOR    90 tablet    Take 1 tablet (80 mg) by mouth daily       buPROPion 300 MG 24 hr tablet    WELLBUTRIN XL    90 tablet    Take 1 tablet (300 mg) by mouth every morning       CALCIUM 500 + D 500-200 MG-IU Tabs      1 tablet twice daily       estradiol 0.1 MG/GM cream    ESTRACE VAGINAL    0.5 g    Place 2 g vaginally three times a week       FISH OIL PO      Take  by mouth.       Multi-vitamin Tabs tablet    Generic drug:  multivitamin, therapeutic with minerals     30    1 TABLET DAILY       * order for DME     2 Device    Equipment being ordered: lymphedema bandages       * order for DME     1 each    Equipment being ordered: Left Thigh High Compression Stocking, 550 CCL.3       tolterodine 4 MG 24 hr capsule    DETROL LA    30 capsule    Take 1 capsule (4 mg) by mouth daily       * Notice:  This list has 2 medication(s) that are the same as other medications prescribed for you. Read the directions carefully, and ask your doctor or other care provider to review them with you.

## 2017-05-26 PROBLEM — M25.562 CHRONIC PAIN OF LEFT KNEE: Status: ACTIVE | Noted: 2017-05-26

## 2017-05-26 PROBLEM — G89.29 CHRONIC PAIN OF LEFT KNEE: Status: ACTIVE | Noted: 2017-05-26

## 2017-05-26 NOTE — PROGRESS NOTES
"Subjective:    Patient is a 67 year old female presenting with rehab left knee hpi. The history is provided by the patient. No  was used.   Angeli Jacobson is a 67 year old female with a left knee condition.  Condition occurred with:  Other reason.  Condition occurred: for unknown reasons.  This is a chronic condition  Pt reports having cancerous tumor removed from her left thigh in 2000 that has left her with \"tightness\" in left thigh.  She is interested in HEP and myofascial therapy to improve flexibility.  She is self referred..    Site of Pain: L thigh.      Pain Scale: 0/10.  Associated symptoms:  Loss of motion/stiffness. Pain is worse during the day.  Symptoms are exacerbated by walking Relieved by: stretching.  Since onset symptoms are unchanged.    Previous treatment includes physical therapy.  There was mild (short term) improvement following previous treatment.  General health as reported by patient is excellent.  Pertinent medical history includes:  Cancer and depression.  Medical allergies: no.    Current medications:  None as reported by the patient.  Current occupation is Retired  .        Barriers include:  None as reported by the patient.    Red flags:  None as reported by the patient.                        Objective:    Standing Alignment:            Hip:  Normal  Knee:  Normal      Gait:    Gait Type:  Normal         Flexibility/Screens:       Lower Extremity:  Decreased left lower extremity flexibility:Adductors; Quadriceps and Hamstrings            Ankle/Foot Evaluation  ROM:  AROM is normal.PROM is normal.            PALPATION: normal    EDEMA: Edema ankle: lymphatic edema present in left leg           MOBILITY TESTING: normal                                                                General     ROS    Assessment/Plan:      Patient is a 67 year old female with left side knee complaints.    Patient has the following significant findings with corresponding treatment " "plan.                Diagnosis 1:  L thiigh \"tightness\"  Decreased ROM/flexibility - manual therapy and therapeutic exercise  Impaired muscle performance - neuro re-education    Therapy Evaluation Codes:   1) History comprised of:   Personal factors that impact the plan of care:      None.    Comorbidity factors that impact the plan of care are:      Cancer.     Medications impacting care: None.  2) Examination of Body Systems comprised of:   Body structures and functions that impact the plan of care:      Knee.   Activity limitations that impact the plan of care are:      Walking.  3) Clinical presentation characteristics are:   Stable/Uncomplicated.  4) Decision-Making    Low complexity using standardized patient assessment instrument and/or measureable assessment of functional outcome.  Cumulative Therapy Evaluation is: Low complexity.    Previous and current functional limitations:  (See Goal Flow Sheet for this information)    Short term and Long term goals: (See Goal Flow Sheet for this information)     Communication ability:  Patient appears to be able to clearly communicate and understand verbal and written communication and follow directions correctly.  Treatment Explanation - The following has been discussed with the patient:   RX ordered/plan of care  Anticipated outcomes  Possible risks and side effects  This patient would benefit from PT intervention to resume normal activities.   Rehab potential is fair.    Frequency:  1 X week, once daily  Duration:  for 3 weeks  Discharge Plan:  Achieve all LTG.  Independent in home treatment program.  Reach maximal therapeutic benefit.    Please refer to the daily flowsheet for treatment today, total treatment time and time spent performing 1:1 timed codes.           "

## 2017-06-26 ENCOUNTER — OFFICE VISIT (OUTPATIENT)
Dept: UROLOGY | Facility: CLINIC | Age: 67
End: 2017-06-26
Payer: COMMERCIAL

## 2017-06-26 DIAGNOSIS — R35.0 URINARY FREQUENCY: Primary | ICD-10-CM

## 2017-06-26 PROCEDURE — 99213 OFFICE O/P EST LOW 20 MIN: CPT | Performed by: UROLOGY

## 2017-06-26 RX ORDER — ESTRADIOL 0.1 MG/G
2 CREAM VAGINAL
Qty: 0.5 G | Refills: 3 | Status: SHIPPED | OUTPATIENT
Start: 2017-06-26 | End: 2017-07-11

## 2017-06-26 NOTE — MR AVS SNAPSHOT
After Visit Summary   6/26/2017    Angeli Jacobson    MRN: [id number]           Patient Information     Date Of Birth          1950        Visit Information        Provider Department      6/26/2017 11:45 AM Ramez Beal MD [facility] Bladder Control [facility]        Today's Diagnoses     Urinary frequency    -  1       Follow-ups after your visit        Follow-up notes from your care team     Return in about 3 months (around 9/26/2017).      Your next 10 appointments already scheduled     Jun 27, 2017  1:30 PM CDT   SHORT with Sepideh Burris PA-C   [facility]    [address] [place] [id number] [phone]              Who to contact     If you have questions or need follow up information about today's clinic visit or your schedule please contact [facility] BLADDER CONTROL [facility] directly at [phone].  Normal or non-critical lab and imaging results will be communicated to you by MyChart, letter or phone within 4 business days after the clinic has received the results. If you do not hear from us within 7 days, please contact the clinic through Guanghetanghart or phone. If you have a critical or abnormal lab result, we will notify you by phone as soon as possible.  Submit refill requests through Openera or call your pharmacy and they will forward the refill request to us. Please allow 3 business days for your refill to be completed.          Additional Information About Your Visit        MyChart Information     Openera gives you secure access to your electronic health record. If you see a primary care provider, you can also send messages to your care team and make appointments. If you have questions, please call your primary care clinic.  If you do not have a primary care provider, please call [phone] and they will assist you.        Care EveryWhere ID     This is your Care  EveryWhere ID. This could be used by other organizations to access your [facility] medical records  [id number]        Your Vitals Were     Last Period                   03/18/2005            Blood Pressure from Last 3 Encounters:   04/27/17 123/73   02/07/17 115/70   11/04/16 112/60    Weight from Last 3 Encounters:   04/27/17 148 lb (67.1 kg)   02/07/17 146 lb (66.2 kg)   11/04/16 146 lb 1.6 oz (66.3 kg)              Today, you had the following     No orders found for display         Today's Medication Changes          These changes are accurate as of: 6/26/17 12:26 PM.  If you have any questions, ask your nurse or doctor.               These medicines have changed or have updated prescriptions.        Dose/Directions    * estradiol 0.1 MG/GM cream   Commonly known as:  ESTRACE VAGINAL   This may have changed:  Another medication with the same name was added. Make sure you understand how and when to take each.   Used for:  Atrophic vaginitis        Dose:  2 g   Place 2 g vaginally three times a week   Quantity:  0.5 g   Refills:  3       * estradiol 0.1 MG/GM cream   Commonly known as:  ESTRACE VAGINAL   This may have changed:  You were already taking a medication with the same name, and this prescription was added. Make sure you understand how and when to take each.   Used for:  Urinary frequency        Dose:  2 g   Place 2 g vaginally three times a week   Quantity:  0.5 g   Refills:  3       * Notice:  This list has 2 medication(s) that are the same as other medications prescribed for you. Read the directions carefully, and ask your doctor or other care provider to review them with you.         Where to get your medicines      These medications were sent to [place] [address] [place], [place] - [address] [facility]  [address] [facility] [address]     Phone:  [phone]     estradiol 0.1 MG/GM cream                Primary Care Provider Office Phone # Fax #    Sepideh Burris PA-C [phone]  [phone]       [facility] [address]  [facility] [address]        Equal Access to Services     KERON OCASIO : Hadii aad ku hadbonniebrook Madonnaali, wawayneda gangamarleneha, carolineta kalazarada anjanamicah, radames boudreaux jajaboone yeungtj dennysmarthacolt hyman. So [facility] [phone].    ATENCIÓN: Si habla español, tiene a carver disposición servicios gratuitos de asistencia lingüística. Llame al [phone].    We comply with applicable federal civil rights laws and [place] laws. We do not discriminate on the basis of race, color, national origin, age, disability sex, sexual orientation or gender identity.            Thank you!     Thank you for choosing [facility] FOR BLADDER CONTROL [facility]  for your care. Our goal is always to provide you with excellent care. Hearing back from our patients is one way we can continue to improve our services. Please take a few minutes to complete the written survey that you may receive in the mail after your visit with us. Thank you!             Your Updated Medication List - Protect others around you: Learn how to safely use, store and throw away your medicines at www.disposemymeds.org.          This list is accurate as of: 6/26/17 12:26 PM.  Always use your most recent med list.                   Brand Name Dispense Instructions for use Diagnosis    aspirin 81 MG tablet      Take 1 tablet by mouth daily.    Mixed hyperlipidemia       atorvastatin 80 MG tablet    LIPITOR    90 tablet    Take 1 tablet (80 mg) by mouth daily    Hyperlipidemia LDL goal <160       buPROPion 300 MG 24 hr tablet    WELLBUTRIN XL    90 tablet    Take 1 tablet (300 mg) by mouth every morning    Major depression in complete remission (H)       CALCIUM 500 + D 500-200 MG-IU Tabs      1 tablet twice daily        * estradiol 0.1 MG/GM cream    ESTRACE VAGINAL    0.5 g    Place 2 g vaginally three times a week    Atrophic vaginitis       * estradiol 0.1 MG/GM cream    ESTRACE VAGINAL    0.5 g    Place 2 g  vaginally three times a week    Urinary frequency       FISH OIL PO      Take  by mouth.        Multi-vitamin Tabs tablet   Generic drug:  multivitamin, therapeutic with minerals     30    1 TABLET DAILY    Routine general medical examination at a health care facility       * order for DME     2 Device    Equipment being ordered: lymphedema bandages    Personal history of malignant neoplasm of other site, Other lymphedema       * order for DME     1 each    Equipment being ordered: Left Thigh High Compression Stocking, 550 CCL.3    Myxoid liposarcoma (H)       * Notice:  This list has 4 medication(s) that are the same as other medications prescribed for you. Read the directions carefully, and ask your doctor or other care provider to review them with you.

## 2017-06-26 NOTE — PROGRESS NOTES
F/u OAB, ant vag wall prolapse without MODE, atrophic vaginitis, constipation    Compliant with Estrace thrice weekly.    Formulary changed Detrol >> Toviaz 8; got marked improvement in bladder control but dry mouth made speaking difficult.    Has moderated [place] down to 4-5 per day.     Has not really increased dietary fiber; has it at home but makes other choices.    Denies dysuria, gross hematuria, frequency; nocturia x 1.      Current Outpatient Prescriptions   Medication     estradiol (ESTRACE VAGINAL) 0.1 MG/GM cream     buPROPion (WELLBUTRIN XL) 300 MG 24 hr tablet     atorvastatin (LIPITOR) 80 MG tablet     aspirin 81 MG tablet     Omega-3 Fatty Acids (FISH OIL PO)     MULTI-VITAMIN OR TABS     CALCIUM 500 + D 500-200 MG-IU OR TABS     order for DME     ORDER FOR DME     No current facility-administered medications for this visit.        (Unable to provide specimen today)      IMP:  1. OAB, improving with behavioral modification  2. Atrophic vaginitis  3. Constipation      PLAN:  1. Discussed situation with patient in detail.  2. Continue Estrace cream thrice weekly  3. Encouraged bran-for-breakfast  4. Hold off on meds for now  5. Continue with moderate fluids  6. RTC 3 mos or sooner prn  7. 15 minutes spent with patient, 100% spent in counseling and coordination of care for OAB/vaginitis.  8. Copy C [place]

## 2017-06-27 ENCOUNTER — OFFICE VISIT (OUTPATIENT)
Dept: FAMILY MEDICINE | Facility: CLINIC | Age: 67
End: 2017-06-27
Payer: COMMERCIAL

## 2017-06-27 VITALS
BODY MASS INDEX: 28.66 KG/M2 | HEIGHT: 60 IN | OXYGEN SATURATION: 98 % | SYSTOLIC BLOOD PRESSURE: 143 MMHG | HEART RATE: 71 BPM | WEIGHT: 146 LBS | DIASTOLIC BLOOD PRESSURE: 89 MMHG | TEMPERATURE: 97.7 F

## 2017-06-27 DIAGNOSIS — R42 VERTIGO: ICD-10-CM

## 2017-06-27 DIAGNOSIS — K59.09 OTHER CONSTIPATION: Primary | ICD-10-CM

## 2017-06-27 PROBLEM — M25.562 CHRONIC PAIN OF LEFT KNEE: Status: RESOLVED | Noted: 2017-05-26 | Resolved: 2017-06-27

## 2017-06-27 PROBLEM — G89.29 CHRONIC PAIN OF LEFT KNEE: Status: RESOLVED | Noted: 2017-05-26 | Resolved: 2017-06-27

## 2017-06-27 PROCEDURE — 99214 OFFICE O/P EST MOD 30 MIN: CPT | Performed by: PHYSICIAN ASSISTANT

## 2017-06-27 RX ORDER — FESOTERODINE FUMARATE 8 MG/1
TABLET, FILM COATED, EXTENDED RELEASE ORAL
Refills: 1 | COMMUNITY
Start: 2017-05-19 | End: 2017-08-24

## 2017-06-27 NOTE — PROGRESS NOTES
SUBJECTIVE:                                                    Angeli Jacobson is a 67 year old female who presents to clinic today for the following health issues:      Constipation/ Irregularity      Onset: years ago    Description:   Frequency of bowel movements: 4 times a week.  Stool consistency: hard,dry    Progression of Symptoms:  same    Accompanying Signs & Symptoms:  Abdominal pain (cramping?): YES- and bloating  Blood in stool: no   Rectal pain: no   Nausea/vomiting: YES- nausea  Weight loss or gain: YES- 2 lbs lost    History:   History of abdominal surgery: no     Precipitating factors:   Recent use of narcotics, anticholinergics, calcium channel blockers, antacids, or iron supplements: yes--anticholingergic  Chronic Laxative Use: no          Therapies Tried and outcome: change in diet and increase in fiber--just started this.       Dizziness  Onset: 2 weeks ago    Description:   Do you feel faint:  no   Does it feel like the surroundings (bed, room) are moving: no   Unsteady/off balance: YES  Have you passed out or fallen: no     Intensity: mild, severe    Progression of Symptoms:  improving    Accompanying Signs & Symptoms:  Heart palpitations: no   Nausea, vomiting: YES-nausea  Weakness in arms or legs: no   Fatigue: no   Vision or speech changes: no   Ringing in ears (Tinnitus): no  Hearing Loss: YES- not from dizziness    History:   Head trauma/concussion hx: no   Previous similar symptoms: no   Recent bleeding history: no     Precipitating factors:   Worse with activity or head movement: YES  Any new medications (BP?): YES- toviaz and estrace  Alcohol/drug abuse/withdrawal: no     Alleviating factors:   Does staying in a fixed position give relief:  YES    Therapies Tried and outcome:     Problem list and histories reviewed & adjusted, as indicated.  Additional history: as documented    Patient Active Problem List   Diagnosis     Pain in joint, multiple sites     MULTINODUL GOITER      Hearing loss     Hyperlipidemia LDL goal <160     Osteoporosis     Cervicalgia     Major depressive disorder, recurrent episode, moderate (H)     Impaired fasting glucose     Advanced directives, counseling/discussion     Lymphedema of left leg     Tubular adenoma     Other constipation     Vertigo     Past Surgical History:   Procedure Laterality Date     C APPENDECTOMY      Appendectomy     C NONSPECIFIC PROCEDURE  2000    Open Biopsy Left Thigh Liposarcoma     COLONOSCOPY N/A 2016    Procedure: COMBINED COLONOSCOPY, SINGLE OR MULTIPLE BIOPSY/POLYPECTOMY BY BIOPSY;  Surgeon: Varun Stanley MD, MD;  Location: [facility]     EXCISION MALIG LESION>1.25CM  2000    Myxoid Liposarcoma       HC COLONOSCOPY THRU STOMA, DIAGNOSTIC      due      HC COLP CERVIX/UPPER VAGINA  1997    Negative     HC DILATION/CURETTAGE DIAG/THER NON OB  1997    Post menopausal bleeding on HRT, negative       Social History   Substance Use Topics     Smoking status: Never Smoker     Smokeless tobacco: Never Used     Alcohol use No     Family History   Problem Relation Age of Onset     C.A.D. Father      MI 57     Alcohol/Drug Father      etoh     Obesity Mother      OSTEOPOROSIS Mother      Lipids Brother      C.A.D. Paternal Grandmother      ascvd     DIABETES Maternal Grandmother      CANCER Maternal Grandmother      C.A.D. Paternal Uncle      Mi  age 48     CANCER Maternal Aunt      pancreatic CA           Reviewed and updated as needed this visit by clinical staff  Tobacco  Med Hx  Surg Hx  Fam Hx  Soc Hx      Reviewed and updated as needed this visit by Provider         ROS:  Constitutional, HEENT, cardiovascular, pulmonary, GI, , musculoskeletal, neuro, skin, endocrine and psych systems are negative, except as otherwise noted.    OBJECTIVE:     /89 (BP Location: Right arm, Patient Position: Chair, Cuff Size: Adult Regular)  Pulse 71  Temp 97.7  F (36.5  C) (Oral)  Ht 5' (1.524 m)  Wt 146 lb (66.2  "kg)  LMP 03/18/2005  HC 5.25\" (13.3 cm)  SpO2 98%  Breastfeeding? No  BMI 28.51 kg/m2  Body mass index is 28.51 kg/(m^2).  GENERAL APPEARANCE: healthy, alert and no distress  EYES: Eyes grossly normal to inspection, PERRL and conjunctivae and sclerae normal  RESP: lungs clear to auscultation - no rales, rhonchi or wheezes  CV: regular rates and rhythm, normal S1 S2, no S3 or S4 and no murmur, click or rub  ABDOMEN: soft, nontender, without hepatosplenomegaly or masses and bowel sounds normal  SKIN: no suspicious lesions or rashes  NEURO: Normal strength and tone, mentation intact, speech normal, cranial nerves 2-12 intact and Romberg negative  PSYCH: mentation appears normal and affect normal/bright        ASSESSMENT/PLAN:             1. Other constipation  Increase fiber.  Increase miralax  Walking.  If no improvement return to clinic and will re-evaluate or send to GI    2. Vertigo  Patient reports h/o vertigo  Trial of Apley maneuvers  If no improvement, will refer           Sepideh Burris PA-C, PA-C  [facility]"

## 2017-06-27 NOTE — MR AVS SNAPSHOT
"              After Visit Summary   6/27/2017    Angeli Jacobson    MRN: [id number]           Patient Information     Date Of Birth          1950        Visit Information        Provider Department      6/27/2017 1:30 PM Sepideh Burris PA-C [facility]        Today's Diagnoses     Other constipation    -  1    Vertigo           Follow-ups after your visit        Who to contact     If you have questions or need follow up information about today's clinic visit or your schedule please contact [facility] directly at [phone].  Normal or non-critical lab and imaging results will be communicated to you by BioVascularhart, letter or phone within 4 business days after the clinic has received the results. If you do not hear from us within 7 days, please contact the clinic through ticketeat or phone. If you have a critical or abnormal lab result, we will notify you by phone as soon as possible.  Submit refill requests through [employer] or call your pharmacy and they will forward the refill request to us. Please allow 3 business days for your refill to be completed.          Additional Information About Your Visit        MyChart Information     [employer] gives you secure access to your electronic health record. If you see a primary care provider, you can also send messages to your care team and make appointments. If you have questions, please call your primary care clinic.  If you do not have a primary care provider, please call [phone] and they will assist you.        Care EveryWhere ID     This is your Care EveryWhere ID. This could be used by other organizations to access your [facility] medical records  [id number]        Your Vitals Were     Pulse Temperature Height Last Period Head Circumference Pulse Oximetry    71 97.7  F (36.5  C) (Oral) 5' (1.524 m) 03/18/2005 5.25\" (13.3 cm) 98%    Breastfeeding? BMI (Body Mass Index)                No 28.51 kg/m2           Blood " Pressure from Last 3 Encounters:   06/27/17 143/89   04/27/17 123/73   02/07/17 115/70    Weight from Last 3 Encounters:   06/27/17 146 lb (66.2 kg)   04/27/17 148 lb (67.1 kg)   02/07/17 146 lb (66.2 kg)              Today, you had the following     No orders found for display         Today's Medication Changes          These changes are accurate as of: 6/27/17  5:04 PM.  If you have any questions, ask your nurse or doctor.               Stop taking these medicines if you haven't already. Please contact your care team if you have questions.     CALCIUM 500 + D 500-200 MG-IU Tabs   Stopped by:  Sepideh Burris PA-C                    Primary Care Provider Office Phone # Fax #    Sepideh Burris PA-C [phone]       [address] [address]        Equal Access to Services     KERON OCASIO : Hadii corazon weio Soomaali, waaxda luqadaha, qaybta kaalmada adeegyada, radames hatch . So [facility] [phone].    ATENCIÓN: Si habla español, tiene a carver disposición servicios gratuitos de asistencia lingüística. Llame al [phone].    We comply with applicable federal civil rights laws and [place] laws. We do not discriminate on the basis of race, color, national origin, age, disability sex, sexual orientation or gender identity.            Thank you!     Thank you for choosing [facility]  for your care. Our goal is always to provide you with excellent care. Hearing back from our patients is one way we can continue to improve our services. Please take a few minutes to complete the written survey that you may receive in the mail after your visit with us. Thank you!             Your Updated Medication List - Protect others around you: Learn how to safely use, store and throw away your medicines at www.disposemymeds.org.          This list is accurate as of: 6/27/17  5:04 PM.  Always use your most recent med  list.                   Brand Name Dispense Instructions for use Diagnosis    aspirin 81 MG tablet      Take 1 tablet by mouth daily.    Mixed hyperlipidemia       atorvastatin 80 MG tablet    LIPITOR    90 tablet    Take 1 tablet (80 mg) by mouth daily    Hyperlipidemia LDL goal <160       buPROPion 300 MG 24 hr tablet    WELLBUTRIN XL    90 tablet    Take 1 tablet (300 mg) by mouth every morning    Major depression in complete remission (H)       * estradiol 0.1 MG/GM cream    ESTRACE VAGINAL    0.5 g    Place 2 g vaginally three times a week    Atrophic vaginitis       * estradiol 0.1 MG/GM cream    ESTRACE VAGINAL    0.5 g    Place 2 g vaginally three times a week    Urinary frequency       FISH OIL PO      Take  by mouth.        Multi-vitamin Tabs tablet   Generic drug:  multivitamin, therapeutic with minerals     30    1 TABLET DAILY    Routine general medical examination at a health care facility       * order for DME     2 Device    Equipment being ordered: lymphedema bandages    Personal history of malignant neoplasm of other site, Other lymphedema       * order for DME     1 each    Equipment being ordered: Left Thigh High Compression Stocking, 550 CCL.3    Myxoid liposarcoma (H)       TOVIAZ 8 MG Tb24   Generic drug:  Fesoterodine Fumarate      TAKE 1 TABLET BY MOUTH ONE TIME DAILY.        * Notice:  This list has 4 medication(s) that are the same as other medications prescribed for you. Read the directions carefully, and ask your doctor or other care provider to review them with you.

## 2017-06-27 NOTE — NURSING NOTE
"Chief Complaint   Patient presents with     Constipation       Initial /89 (BP Location: Right arm, Patient Position: Chair, Cuff Size: Adult Regular)  Pulse 71  Temp 97.7  F (36.5  C) (Oral)  Ht 5' (1.524 m)  Wt 146 lb (66.2 kg)  LMP 03/18/2005  HC 5.25\" (13.3 cm)  SpO2 98%  Breastfeeding? No  BMI 28.51 kg/m2 Estimated body mass index is 28.51 kg/(m^2) as calculated from the following:    Height as of this encounter: 5' (1.524 m).    Weight as of this encounter: 146 lb (66.2 kg).  Medication Reconciliation: complete Giselle Stanley MA  Health Maintenance has been reviewed.       "

## 2017-07-11 ENCOUNTER — TELEPHONE (OUTPATIENT)
Dept: UROLOGY | Facility: CLINIC | Age: 67
End: 2017-07-11

## 2017-07-11 DIAGNOSIS — R35.0 URINARY FREQUENCY: ICD-10-CM

## 2017-07-11 RX ORDER — ESTRADIOL 0.1 MG/G
2 CREAM VAGINAL
Qty: 0.5 G | Refills: 3 | Status: SHIPPED | OUTPATIENT
Start: 2017-07-12 | End: 2017-08-24

## 2017-07-11 NOTE — TELEPHONE ENCOUNTER
Reason for Call:  Other prescription    Detailed comments: Patient would like Rx for estrace sent to the [facility] pharmacy fax is [phone] and phone is [phone]. Call if questions.    Phone Number Patient can be reached at: Home number on file [phone] (home)    Best Time: any    Can we leave a detailed message on this number? YES    Call taken on 7/11/2017 at 1:09 PM by Lala Morris

## 2017-07-18 ENCOUNTER — TELEPHONE (OUTPATIENT)
Dept: UROLOGY | Facility: CLINIC | Age: 67
End: 2017-07-18

## 2017-08-24 ENCOUNTER — OFFICE VISIT (OUTPATIENT)
Dept: FAMILY MEDICINE | Facility: CLINIC | Age: 67
End: 2017-08-24
Payer: COMMERCIAL

## 2017-08-24 VITALS
WEIGHT: 144.8 LBS | SYSTOLIC BLOOD PRESSURE: 112 MMHG | BODY MASS INDEX: 28.43 KG/M2 | HEART RATE: 75 BPM | HEIGHT: 60 IN | OXYGEN SATURATION: 98 % | DIASTOLIC BLOOD PRESSURE: 74 MMHG | TEMPERATURE: 97.6 F

## 2017-08-24 DIAGNOSIS — Z00.00 ROUTINE GENERAL MEDICAL EXAMINATION AT A HEALTH CARE FACILITY: Primary | ICD-10-CM

## 2017-08-24 DIAGNOSIS — R79.89 ELEVATED TSH: Primary | ICD-10-CM

## 2017-08-24 DIAGNOSIS — Z12.31 VISIT FOR SCREENING MAMMOGRAM: ICD-10-CM

## 2017-08-24 DIAGNOSIS — E78.5 HYPERLIPIDEMIA LDL GOAL <160: ICD-10-CM

## 2017-08-24 DIAGNOSIS — F32.5 MAJOR DEPRESSION IN COMPLETE REMISSION (H): ICD-10-CM

## 2017-08-24 DIAGNOSIS — Z78.0 ASYMPTOMATIC POSTMENOPAUSAL STATUS: ICD-10-CM

## 2017-08-24 DIAGNOSIS — M79.672 LEFT FOOT PAIN: ICD-10-CM

## 2017-08-24 DIAGNOSIS — R49.0 HOARSENESS: ICD-10-CM

## 2017-08-24 LAB
ALBUMIN SERPL-MCNC: 3.8 G/DL (ref 3.4–5)
ALP SERPL-CCNC: 88 U/L (ref 40–150)
ALT SERPL W P-5'-P-CCNC: 30 U/L (ref 0–50)
ANION GAP SERPL CALCULATED.3IONS-SCNC: 8 MMOL/L (ref 3–14)
AST SERPL W P-5'-P-CCNC: 27 U/L (ref 0–45)
BILIRUB SERPL-MCNC: 0.5 MG/DL (ref 0.2–1.3)
BUN SERPL-MCNC: 15 MG/DL (ref 7–30)
CALCIUM SERPL-MCNC: 9.3 MG/DL (ref 8.5–10.1)
CHLORIDE SERPL-SCNC: 106 MMOL/L (ref 94–109)
CHOLEST SERPL-MCNC: 221 MG/DL
CO2 SERPL-SCNC: 27 MMOL/L (ref 20–32)
CREAT SERPL-MCNC: 0.81 MG/DL (ref 0.52–1.04)
ERYTHROCYTE [DISTWIDTH] IN BLOOD BY AUTOMATED COUNT: 13.6 % (ref 10–15)
GFR SERPL CREATININE-BSD FRML MDRD: 70 ML/MIN/1.7M2
GLUCOSE SERPL-MCNC: 86 MG/DL (ref 70–99)
HCT VFR BLD AUTO: 43.8 % (ref 35–47)
HDLC SERPL-MCNC: 65 MG/DL
HGB BLD-MCNC: 14.4 G/DL (ref 11.7–15.7)
LDLC SERPL CALC-MCNC: 139 MG/DL
MCH RBC QN AUTO: 30.6 PG (ref 26.5–33)
MCHC RBC AUTO-ENTMCNC: 32.9 G/DL (ref 31.5–36.5)
MCV RBC AUTO: 93 FL (ref 78–100)
NONHDLC SERPL-MCNC: 156 MG/DL
PLATELET # BLD AUTO: 262 10E9/L (ref 150–450)
POTASSIUM SERPL-SCNC: 4.1 MMOL/L (ref 3.4–5.3)
PROT SERPL-MCNC: 7.3 G/DL (ref 6.8–8.8)
RBC # BLD AUTO: 4.7 10E12/L (ref 3.8–5.2)
SODIUM SERPL-SCNC: 141 MMOL/L (ref 133–144)
T4 FREE SERPL-MCNC: 0.94 NG/DL (ref 0.76–1.46)
TRIGL SERPL-MCNC: 83 MG/DL
TSH SERPL DL<=0.005 MIU/L-ACNC: 4.75 MU/L (ref 0.4–4)
WBC # BLD AUTO: 5 10E9/L (ref 4–11)

## 2017-08-24 PROCEDURE — G0402 INITIAL PREVENTIVE EXAM: HCPCS | Performed by: PHYSICIAN ASSISTANT

## 2017-08-24 PROCEDURE — 80053 COMPREHEN METABOLIC PANEL: CPT | Performed by: PHYSICIAN ASSISTANT

## 2017-08-24 PROCEDURE — 84439 ASSAY OF FREE THYROXINE: CPT | Performed by: PHYSICIAN ASSISTANT

## 2017-08-24 PROCEDURE — 80061 LIPID PANEL: CPT | Performed by: PHYSICIAN ASSISTANT

## 2017-08-24 PROCEDURE — 36415 COLL VENOUS BLD VENIPUNCTURE: CPT | Performed by: PHYSICIAN ASSISTANT

## 2017-08-24 PROCEDURE — 84443 ASSAY THYROID STIM HORMONE: CPT | Performed by: PHYSICIAN ASSISTANT

## 2017-08-24 PROCEDURE — 85027 COMPLETE CBC AUTOMATED: CPT | Performed by: PHYSICIAN ASSISTANT

## 2017-08-24 RX ORDER — BUPROPION HYDROCHLORIDE 300 MG/1
300 TABLET ORAL EVERY MORNING
Qty: 90 TABLET | Refills: 3 | Status: SHIPPED | OUTPATIENT
Start: 2017-08-24 | End: 2018-05-22

## 2017-08-24 RX ORDER — ATORVASTATIN CALCIUM 80 MG/1
80 TABLET, FILM COATED ORAL DAILY
Qty: 90 TABLET | Refills: 3 | Status: ON HOLD | OUTPATIENT
Start: 2017-08-24 | End: 2018-05-03

## 2017-08-24 RX ORDER — FLUTICASONE PROPIONATE 50 MCG
1-2 SPRAY, SUSPENSION (ML) NASAL DAILY
Qty: 1 BOTTLE | Refills: 11 | Status: SHIPPED | OUTPATIENT
Start: 2017-08-24 | End: 2017-10-30

## 2017-08-24 ASSESSMENT — ANXIETY QUESTIONNAIRES
3. WORRYING TOO MUCH ABOUT DIFFERENT THINGS: NOT AT ALL
2. NOT BEING ABLE TO STOP OR CONTROL WORRYING: NOT AT ALL
7. FEELING AFRAID AS IF SOMETHING AWFUL MIGHT HAPPEN: NOT AT ALL
6. BECOMING EASILY ANNOYED OR IRRITABLE: SEVERAL DAYS
GAD7 TOTAL SCORE: 1
IF YOU CHECKED OFF ANY PROBLEMS ON THIS QUESTIONNAIRE, HOW DIFFICULT HAVE THESE PROBLEMS MADE IT FOR YOU TO DO YOUR WORK, TAKE CARE OF THINGS AT HOME, OR GET ALONG WITH OTHER PEOPLE: NOT DIFFICULT AT ALL
1. FEELING NERVOUS, ANXIOUS, OR ON EDGE: NOT AT ALL
5. BEING SO RESTLESS THAT IT IS HARD TO SIT STILL: NOT AT ALL

## 2017-08-24 ASSESSMENT — PATIENT HEALTH QUESTIONNAIRE - PHQ9
5. POOR APPETITE OR OVEREATING: NOT AT ALL
SUM OF ALL RESPONSES TO PHQ QUESTIONS 1-9: 4

## 2017-08-24 NOTE — NURSING NOTE
Chief Complaint   Patient presents with     Physical       Initial /74 (BP Location: Right arm, Patient Position: Chair, Cuff Size: Adult Regular)  Pulse 75  Temp 97.6  F (36.4  C) (Oral)  Ht 5' (1.524 m)  Wt 144 lb 12.8 oz (65.7 kg)  LMP 03/18/2005  SpO2 98%  Breastfeeding? No  BMI 28.28 kg/m2 Estimated body mass index is 28.28 kg/(m^2) as calculated from the following:    Height as of this encounter: 5' (1.524 m).    Weight as of this encounter: 144 lb 12.8 oz (65.7 kg).  Medication Reconciliation: complete Giselle Stanley MA  Health Maintenance has been reviewed.

## 2017-08-24 NOTE — PROGRESS NOTES
SUBJECTIVE:   Angeli Jacobson is a 67 year old female who presents for Preventive Visit.      Are you in the first 12 months of your Medicare coverage?  Yes,  Visual Acuity:  Right Eye: 20/25   Left Eye: 20/32  Both Eyes: 20/25    Physical   Annual:     Getting at least 3 servings of Calcium per day::  Yes    Bi-annual eye exam::  Yes    Dental care twice a year::  Yes    Sleep apnea or symptoms of sleep apnea::  None    Diet::  Low fat/cholesterol    Frequency of exercise::  2-3 days/week    Duration of exercise::  30-45 minutes    Taking medications regularly::  Yes    Additional concerns today::  No      COGNITIVE SCREEN  1) Repeat 3 items (Banana, Sunrise, Chair)    2) Clock draw: ABNORMAL patient put time to 11:05  3) 3 item recall: Recalls 3 objects  Results: abnormal clock and all objects recalled.     Mini-CogTM Copyright S [employer]. Licensed by the author for use in [facility]; reprinted with permission ([email or web address]). All rights reserved.          Reviewed and updated as needed this visit by clinical staff         Reviewed and updated as needed this visit by Provider      Social History   Substance Use Topics     Smoking status: Never Smoker     Smokeless tobacco: Never Used     Alcohol use No       The patient does not drink >3 drinks per day nor >7 drinks per week.          Concern - Hoarseness  Onset: 1 month    Description:   Mild voice changes, noticed more with lying down.     Intensity: mild    Progression of Symptoms:  waxing and waning    Accompanying Signs & Symptoms:      Previous history of similar problem:       Precipitating factors:   Worsened by:     Alleviating factors:  Improved by:     Therapies Tried and outcome:     Left foot pain--would like to see Podiatry. Referral requested.    Hyperlipidemia Follow-Up      Rate your low fat/cholesterol diet?: good    Taking statin?  No    Other lipid medications/supplements?:  none    Depression Followup    Status since last visit:  Stable     See PHQ-9 for current symptoms.  Other associated symptoms: None    Complicating factors:   Significant life event:  No   Current substance abuse:  None  Anxiety or Panic symptoms:  No    PHQ-9  English  PHQ-9   Any Language          Today's PHQ-2 Score: PHQ-2 ( 1999 [employer]) 8/21/2017   Q1: Little interest or pleasure in doing things 0   Q2: Feeling down, depressed or hopeless 1   PHQ-2 Score 1   Q1: Little interest or pleasure in doing things Not at all   Q2: Feeling down, depressed or hopeless Several days   PHQ-2 Score 1       Do you feel safe in your environment - Yes    Do you have a Health Care Directive?: Yes: Advance Directive has been received and scanned.    Current providers sharing in care for this patient include:   Patient Care Team:  Sepideh Burris PA-C as PCP - General (Physician Assistant)      Hearing impairment: Yes, hearing aids    Ability to successfully perform activities of daily living: Yes, no assistance needed     Fall risk:  Fall Risk Assessment not completed.    Home safety:  none identified  click delete button to remove this line now    The following health maintenance items are reviewed in Epic and correct as of today:  Health Maintenance   Topic Date Due     DEPRESSION ACTION PLAN Q1 YR  12/11/2016     PHQ-9 Q6 MONTHS  08/07/2017     FALL RISK ASSESSMENT  08/23/2017     MAMMO Q2 YR  08/26/2017     INFLUENZA VACCINE (SYSTEM ASSIGNED)  09/01/2017     CYNDI QUESTIONNAIRE 1 YEAR  02/07/2018     TETANUS Q10 YR  03/10/2018     LIPID MONITORING Q1 YEAR  03/24/2018     ADVANCE DIRECTIVE PLANNING Q5 YRS  08/25/2020     DEXA Q5 YR  08/26/2020     COLONOSCOPY Q5 YR  02/05/2021     PNEUMOCOCCAL  Completed     HEPATITIS C SCREENING  Completed     Labs reviewed in EPIC    Pneumonia Vaccine:Adults age 65+ who received Pneumovax (PPSV23) at 65 years or older: Should be given PCV13 > 1 year after their most recent PPSV23  Mammogram Screening: Patient over age 50, mutual decision to  screen reflected in health maintenance.    ROS:  Constitutional, HEENT, cardiovascular, pulmonary, GI, , musculoskeletal, neuro, skin, endocrine and psych systems are negative, except as otherwise noted.      OBJECTIVE:   LMP 03/18/2005 Estimated body mass index is 28.51 kg/(m^2) as calculated from the following:    Height as of 6/27/17: 5' (1.524 m).    Weight as of 6/27/17: 146 lb (66.2 kg).  EXAM:   GENERAL APPEARANCE: healthy, alert and no distress  EYES: Eyes grossly normal to inspection, PERRL and conjunctivae and sclerae normal  HENT: ear canals and TM's normal, nose and mouth without ulcers or lesions, oropharynx clear and oral mucous membranes moist, mild post nasal drip noted.   NECK: no adenopathy, no asymmetry, masses, or scars and thyroid normal to palpation  RESP: lungs clear to auscultation - no rales, rhonchi or wheezes  BREAST: normal without masses, tenderness or nipple discharge and no palpable axillary masses or adenopathy  CV: regular rate and rhythm, normal S1 S2, no S3 or S4, no murmur, click or rub, no peripheral edema and peripheral pulses strong  ABDOMEN: soft, nontender, no hepatosplenomegaly, no masses and bowel sounds normal  MS: no musculoskeletal defects are noted and gait is age appropriate without ataxia  SKIN: no suspicious lesions or rashes  NEURO: Normal strength and tone, sensory exam grossly normal, mentation intact and speech normal  PSYCH: mentation appears normal and affect normal/bright    ASSESSMENT / PLAN:   1. Routine general medical examination at a health care facility  Fasting.   - CBC with platelets  - Comprehensive metabolic panel  - Lipid panel reflex to direct LDL    2. Major depression in complete remission (H)  Stable. F/u in 1 year.   - buPROPion (WELLBUTRIN XL) 300 MG 24 hr tablet; Take 1 tablet (300 mg) by mouth every morning  Dispense: 90 tablet; Refill: 3    3. Hyperlipidemia LDL goal <160  Await labs.   - atorvastatin (LIPITOR) 80 MG tablet; Take 1 tablet  (80 mg) by mouth daily  Dispense: 90 tablet; Refill: 3    4. Left foot pain  Referral placed.   - ORTHO  REFERRAL    5. Hoarseness  Trial of flonase. If no improvement f/u with ENT, pt is already established with ENT  - fluticasone (FLONASE) 50 MCG/ACT spray; Spray 1-2 sprays into both nostrils daily  Dispense: 1 Bottle; Refill: 11  - TSH with free T4 reflex    6. Asymptomatic postmenopausal status    - DEXA HIP/PELVIS/SPINE - Future; Future    7. Visit for screening mammogram    - MA SCREENING DIGITAL BILAT - Future  (s+30); Future    End of Life Planning:  Patient currently has an advanced directive: Yes.  Practitioner is supportive of decision.    COUNSELING:  Reviewed preventive health counseling, as reflected in patient instructions       Regular exercise       Healthy diet/nutrition       Osteoporosis Prevention/Bone Health       Advanced Planning         Estimated body mass index is 28.51 kg/(m^2) as calculated from the following:    Height as of 6/27/17: 5' (1.524 m).    Weight as of 6/27/17: 146 lb (66.2 kg).     reports that she has never smoked. She has never used smokeless tobacco.        Appropriate preventive services were discussed with this patient, including applicable screening as appropriate for cardiovascular disease, diabetes, osteopenia/osteoporosis, and glaucoma.  As appropriate for age/gender, discussed screening for colorectal cancer, prostate cancer, breast cancer, and cervical cancer. Checklist reviewing preventive services available has been given to the patient.    Reviewed patients plan of care and provided an AVS. The Basic Care Plan (routine screening as documented in Health Maintenance) for Angeli meets the Care Plan requirement. This Care Plan has been established and reviewed with the Patient.    Counseling Resources:  ATP IV Guidelines  Pooled Cohorts Equation Calculator  Breast Cancer Risk Calculator  FRAX Risk Assessment  ICSI Preventive Guidelines  Dietary  Guidelines for Americans, 2010  USDA's MyPlate  ASA Prophylaxis  Lung CA Screening    Sepideh Burris PA-C, PABarakC  [facility] for HPI/ROS submitted by the patient on 8/21/2017   PHQ-2 Score: 1

## 2017-08-24 NOTE — MR AVS SNAPSHOT
After Visit Summary   8/24/2017    Angeli Jacobson    MRN: [id number]           Patient Information     Date Of Birth          1950        Visit Information        Provider Department      8/24/2017 9:00 AM Sepideh Burris PA-C [facility]        Today's Diagnoses     Asymptomatic postmenopausal status        Visit for screening mammogram        Need for prophylactic vaccination and inoculation against influenza          Care Instructions      Preventive Health Recommendations  Female Ages 65 +    Yearly exam:     See your health care provider every year in order to  o Review health changes.   o Discuss preventive care.    o Review your medicines if your doctor has prescribed any.      You no longer need a yearly Pap test unless you've had an abnormal Pap test in the past 10 years. If you have vaginal symptoms, such as bleeding or discharge, be sure to talk with your provider about a Pap test.      Every 1 to 2 years, have a mammogram.  If you are over 69, talk with your health care provider about whether or not you want to continue having screening mammograms.      Every 10 years, have a colonoscopy. Or, have a yearly FIT test (stool test). These exams will check for colon cancer.       Have a cholesterol test every 5 years, or more often if your doctor advises it.       Have a diabetes test (fasting glucose) every three years. If you are at risk for diabetes, you should have this test more often.       At age 65, have a bone density scan (DEXA) to check for osteoporosis (brittle bone disease).    Shots:    Get a flu shot each year.    Get a tetanus shot every 10 years.    Talk to your doctor about your pneumonia vaccines. There are now two you should receive - Pneumovax (PPSV 23) and Prevnar (PCV 13).    Talk to your doctor about the shingles vaccine.    Talk to your doctor about the hepatitis B vaccine.    Nutrition:     Eat at least 5 servings of fruits and  vegetables each day.      Eat whole-grain bread, whole-wheat pasta and brown rice instead of white grains and rice.      Talk to your provider about Calcium and Vitamin D.     Lifestyle    Exercise at least 150 minutes a week (30 minutes a day, 5 days a week). This will help you control your weight and prevent disease.      Limit alcohol to one drink per day.      No smoking.       Wear sunscreen to prevent skin cancer.       See your dentist twice a year for an exam and cleaning.      See your eye doctor every 1 to 2 years to screen for conditions such as glaucoma, macular degeneration, cataracts, etc           Follow-ups after your visit        Your next 10 appointments already scheduled     Sep 28, 2017 11:00 AM CDT   Return Visit with Ramez Beal MD   [facility] ([address] [address] [phone]              Who to contact     If you have questions or need follow up information about today's clinic visit or your schedule please contact [facility] directly at [phone].  Normal or non-critical lab and imaging results will be communicated to you by [employer], letter or phone within 4 business days after the clinic has received the results. If you do not hear from us within 7 days, please contact the clinic through [employer] or phone. If you have a critical or abnormal lab result, we will notify you by phone as soon as possible.  Submit refill requests through Dtime or call your pharmacy and they will forward the refill request to us. Please allow 3 business days for your refill to be completed.          Additional Information About Your Visit        Dtime Information     Dtime gives you secure access to your electronic health record. If you see a primary care provider, you can also send messages to your care team and make appointments. If you have questions, please call your primary care clinic.  If you do not  have a primary care provider, please call [phone] and they will assist you.        Care EveryWhere ID     This is your Care EveryWhere ID. This could be used by other organizations to access your [facility] medical records  [id number]        Your Vitals Were     Pulse Temperature Height Last Period Pulse Oximetry Breastfeeding?    75 97.6  F (36.4  C) (Oral) 5' (1.524 m) 03/18/2005 98% No    BMI (Body Mass Index)                   28.28 kg/m2            Blood Pressure from Last 3 Encounters:   08/24/17 112/74   06/27/17 143/89   04/27/17 123/73    Weight from Last 3 Encounters:   08/24/17 144 lb 12.8 oz (65.7 kg)   06/27/17 146 lb (66.2 kg)   04/27/17 148 lb (67.1 kg)              We Performed the Following     DEPRESSION ACTION PLAN (DAP)          Today's Medication Changes          These changes are accurate as of: 8/24/17  9:06 AM.  If you have any questions, ask your nurse or doctor.               These medicines have changed or have updated prescriptions.        Dose/Directions    estradiol 0.1 MG/GM cream   Commonly known as:  ESTRACE VAGINAL   This may have changed:  Another medication with the same name was removed. Continue taking this medication, and follow the directions you see here.   Used for:  Atrophic vaginitis   Changed by:  Ramez Beal MD        Dose:  2 g   Place 2 g vaginally three times a week   Quantity:  0.5 g   Refills:  3         Stop taking these medicines if you haven't already. Please contact your care team if you have questions.     TOVIAZ 8 MG Tb24   Generic drug:  Fesoterodine Fumarate   Stopped by:  Sepideh Burris PA-C                    Primary Care Provider Office Phone # Fax #    Sepideh Burris PA-C [phone] [address] [facility] [address]        Equal Access to Services     [facility] AMARJIT AH: Judit dowell Soangel, waaxda luqadaha, qaybta kaalmada adeegyada, waxay kanika hyman. So [facility]  [phone].    ATENCIÓN: Si stanley anderson, tiene a carver disposición servicios gratuitos de asistencia lingüística. Ca velez [phone].    We comply with applicable federal civil rights laws and [place] laws. We do not discriminate on the basis of race, color, national origin, age, disability sex, sexual orientation or gender identity.            Thank you!     Thank you for choosing [facility]  for your care. Our goal is always to provide you with excellent care. Hearing back from our patients is one way we can continue to improve our services. Please take a few minutes to complete the written survey that you may receive in the mail after your visit with us. Thank you!             Your Updated Medication List - Protect others around you: Learn how to safely use, store and throw away your medicines at www.disposemymeds.org.          This list is accurate as of: 8/24/17  9:06 AM.  Always use your most recent med list.                   Brand Name Dispense Instructions for use Diagnosis    aspirin 81 MG tablet      Take 1 tablet by mouth daily.    Mixed hyperlipidemia       atorvastatin 80 MG tablet    LIPITOR    90 tablet    Take 1 tablet (80 mg) by mouth daily    Hyperlipidemia LDL goal <160       buPROPion 300 MG 24 hr tablet    WELLBUTRIN XL    90 tablet    Take 1 tablet (300 mg) by mouth every morning    Major depression in complete remission (H)       estradiol 0.1 MG/GM cream    ESTRACE VAGINAL    0.5 g    Place 2 g vaginally three times a week    Atrophic vaginitis       FISH OIL PO      Take  by mouth.        Multi-vitamin Tabs tablet   Generic drug:  multivitamin, therapeutic with minerals     30    1 TABLET DAILY    Routine general medical examination at a health care facility       * order for DME     2 Device    Equipment being ordered: lymphedema bandages    Personal history of malignant neoplasm of other site, Other lymphedema       * order for DME     1 each    Equipment being  ordered: Left Thigh High Compression Stocking, 550 CCL.3    Myxoid liposarcoma (H)       * Notice:  This list has 2 medication(s) that are the same as other medications prescribed for you. Read the directions carefully, and ask your doctor or other care provider to review them with you.

## 2017-08-24 NOTE — LETTER
My Depression Action Plan  Name: Angeli Jacobson   Date of Birth 1950  Date: 8/24/2017    My doctor: eSpideh Burris   My clinic: [address] [address] [phone]          GREEN    ZONE   Good Control    What it looks like:     Things are going generally well. You have normal up s and down s. You may even feel depressed from time to time, but bad moods usually last less than a day.   What you need to do:  1. Continue to care for yourself (see self care plan)  2. Check your depression survival kit and update it as needed  3. Follow your physician s recommendations including any medication.  4. Do not stop taking medication unless you consult with your physician first.           YELLOW         ZONE Getting Worse    What it looks like:     Depression is starting to interfere with your life.     It may be hard to get out of bed; you may be starting to isolate yourself from others.    Symptoms of depression are starting to last most all day and this has happened for several days.     You may have suicidal thoughts but they are not constant.   What you need to do:     1. Call your care team, your response to treatment will improve if you keep your care team informed of your progress. Yellow periods are signs an adjustment may need to be made.     2. Continue your self-care, even if you have to fake it!    3. Talk to someone in your support network    4. Open up your depression survival kit           RED    ZONE Medical Alert - Get Help    What it looks like:     Depression is seriously interfering with your life.     You may experience these or other symptoms: You can t get out of bed most days, can t work or engage in other necessary activities, you have trouble taking care of basic hygiene, or basic responsibilities, thoughts of suicide or death that will not go away, self-injurious behavior.     What you need to do:  1. Call  your care team and request a same-day appointment. If they are not available (weekends or after hours) call your local crisis line, emergency room or 911.      Electronically signed by: Sepideh Burris PA-C, August 24, 2017    Depression Self Care Plan / Survival Kit    Self-Care for Depression  Here s the deal. Your body and mind are really not as separate as most people think.  What you do and think affects how you feel and how you feel influences what you do and think. This means if you do things that people who feel good do, it will help you feel better.  Sometimes this is all it takes.  There is also a place for medication and therapy depending on how severe your depression is, so be sure to consult with your medical provider and/ or Behavioral Health Consultant if your symptoms are worsening or not improving.     In order to better manage my stress, I will:    Exercise  Get some form of exercise, every day. This will help reduce pain and release endorphins, the  feel good  chemicals in your brain. This is almost as good as taking antidepressants!  This is not the same as joining a gym and then never going! (they count on that by the way ) It can be as simple as just going for a walk or doing some gardening, anything that will get you moving.      Hygiene   Maintain good hygiene (Get out of bed in the morning, Make your bed, Brush your teeth, Take a shower, and Get dressed like you were going to work, even if you are unemployed).  If your clothes don't fit try to get ones that do.    Diet  I will strive to eat foods that are good for me, drink plenty of water, and avoid excessive sugar, caffeine, alcohol, and other mood-altering substances.  Some foods that are helpful in depression are: complex carbohydrates, B vitamins, flaxseed, fish or fish oil, fresh fruits and vegetables.    Psychotherapy  I agree to participate in Individual Therapy (if recommended).    Medication  If prescribed medications, I  agree to take them.  Missing doses can result in serious side effects.  I understand that drinking alcohol, or other illicit drug use, may cause potential side effects.  I will not stop my medication abruptly without first discussing it with my provider.    Staying Connected With Others  I will stay in touch with my friends, family members, and my primary care provider/team.    Use your imagination  Be creative.  We all have a creative side; it doesn t matter if it s oil painting, sand castles, or mud pies! This will also kick up the endorphins.    Witness Beauty  (AKA stop and smell the roses) Take a look outside, even in mid-winter. Notice colors, textures. Watch the squirrels and birds.     Service to others  Be of service to others.  There is always someone else in need.  By helping others we can  get out of ourselves  and remember the really important things.  This also provides opportunities for practicing all the other parts of the program.    Humor  Laugh and be silly!  Adjust your TV habits for less news and crime-drama and more comedy.    Control your stress  Try breathing deep, massage therapy, biofeedback, and meditation. Find time to relax each day.     My support system    Clinic Contact:  Phone number:    Contact 1:  Phone number:    Contact 2:  Phone number:    Congregational/:  Phone number:    Therapist:  Phone number:    Local crisis center:    Phone number:    Other community support:  Phone number:

## 2017-08-25 ASSESSMENT — ANXIETY QUESTIONNAIRES: GAD7 TOTAL SCORE: 1

## 2017-08-29 ENCOUNTER — RADIANT APPOINTMENT (OUTPATIENT)
Dept: GENERAL RADIOLOGY | Facility: CLINIC | Age: 67
End: 2017-08-29
Attending: PODIATRIST
Payer: COMMERCIAL

## 2017-08-29 ENCOUNTER — OFFICE VISIT (OUTPATIENT)
Dept: PODIATRY | Facility: CLINIC | Age: 67
End: 2017-08-29
Payer: COMMERCIAL

## 2017-08-29 VITALS
BODY MASS INDEX: 28.27 KG/M2 | DIASTOLIC BLOOD PRESSURE: 76 MMHG | WEIGHT: 144 LBS | SYSTOLIC BLOOD PRESSURE: 118 MMHG | HEIGHT: 60 IN

## 2017-08-29 DIAGNOSIS — M76.72 PERONEAL TENDINITIS OF LEFT LOWER EXTREMITY: ICD-10-CM

## 2017-08-29 DIAGNOSIS — M79.672 LEFT FOOT PAIN: Primary | ICD-10-CM

## 2017-08-29 DIAGNOSIS — M79.672 LEFT FOOT PAIN: ICD-10-CM

## 2017-08-29 DIAGNOSIS — Q66.70 PES CAVUS, CONGENITAL: ICD-10-CM

## 2017-08-29 PROCEDURE — 73630 X-RAY EXAM OF FOOT: CPT | Mod: LT

## 2017-08-29 PROCEDURE — 99203 OFFICE O/P NEW LOW 30 MIN: CPT | Performed by: PODIATRIST

## 2017-08-29 NOTE — PATIENT INSTRUCTIONS
DR. ARIAS'S CLINIC LOCATIONS:   MONDAY AM - [place] TUESDAY - [place]   [address] [address] [address] [address] [place] [address]   [phone] / [phone] / [phone]       WEDNESDAY - [place] FRIDAY AM - [facility]   [address]   [place] [address] [place] [address]   [phone] / [phone]       FRIDAY PM - [place] SCHEDULE SURGERY: [phone] [address] BILLING QUESTIONS: [phone]   [address] [address] [address] AFTER HOURS: [phone]   [phone] / [phone] APPOINTMENTS: [phone]     Follow up in 1 month if still painful   TENDONITIS   Tendons are the strong fibrous portions ofmuscles that attach to bones and allow the muscle to move a joint when it contracts. Tendons are very strong because they have a lot of force exerted on them. Sometimes tendons can become painful because they have suffered an acute injury, in which too much force was exerted at one time, or an overuse injury, in which a normal force was exerted too frequently or over a prolonged period of time. As a result, there is damage to the tendon and its surrounding soft tissue structures and they become inflammed. Because tendons do not have a great blood supply, they do not heal rapidly and the inflammation can become chronic.   Conservative treatment for tendinitis involves rest and anti-inflammatory measures. Ice is applied 15 minutes 2-3 times daily. Anti-inflammatory medications called NSAIDs (ibuprofen, example) can be taken provided they are used with caution, as they can lead to internal bleeding and increase the risk ofstroke and heart attack. Sometimes topical nitroglycerin is prescribed to help with pain. Often your doctor will use a special shoe or removable walking cast to immobilize the tendon, allowing it to heal without further damage from use. These devices are very useful in helping tendons  heal, but they may slow you down or make you feel like your hip, knee, or back are out ofalignment. This is temporary and should go away once you are out ofthe immobilization. You should not use a walking cast when showering or driving. Another option is Platelet Rich Plasma injections. (Normally done with a Sports and Orthorapedic doctor.   If conservative measures fail, your physician may need to surgically repair the tendon by removing any chronic inflammatory tissue and sewing it back together. Sometimes it is sewn to an adjacent tendon with similar function for support and sometimes it is lengthened. . Sometimes the bones around the tendon need to be realigned or reshaped to better support the tendon or prevent further damage. Your foot and ankle surgeon will discuss the specifics of your surgery with you, should you need it.    Towel stretch: Sit on a hard surface with your injured leg stretched out in front of you. Loop a towel around your toes and the ball of your foot and pull the towel toward your body keeping your leg straight. Hold this position for 15 to 30 seconds and then relax. Repeat 3 times. Then push the towel away with the ball of your foot. Repeat 3 times.  When you don't feel much of a stretch using the towel, you can start the standing calf stretch and the following exercises.  Standing calf stretch: Stand facing a wall with your hands on the wall at about eye level. Keep your injured leg back with your heel on the floor. Keep the other leg forward with the knee bent. Turn your back foot slightly inward (as if you were pigeon-toed). Slowly lean into the wall until you feel a stretch in the back of your calf. Hold the stretch for 15 to 30 seconds. Return to the starting position. Repeat 3 times. Do this exercise several times each day.   Standing soleus stretch: Stand facing a wall with your hands on the wall at about chest height. Keep your injured leg back with your heel on the floor. Keep  the other leg forward with the knee bent. Turn your back foot slightly inward (as if you were pigeon-toed). Bend your back knee slightly and gently lean into the wall until you feel a stretch in the lower calf of your injured leg. Hold the stretch for 15 to 30 seconds. Return to the starting position. Repeat 3 times.   Achilles stretch: Stand with the ball of one foot on a stair. Reach for the step below with your heel until you feel a stretch in the arch of your foot. Hold this position for 15 to 30 seconds and then relax. Repeat 3 times.   Heel raise: Balance yourself while standing behind a chair or counter. Using the chair or counter as a support to help you, raise your body up onto your toes and hold for 5 seconds. Then slowly lower yourself down without holding onto the support. (It's OK to keep holding onto the support if you need to.) When this exercise becomes less painful, try lowering yourself down on the injured leg only. Repeat 15 times. Do 2 sets of 15. Rest 30 seconds between sets.   Step-up: Stand with the foot of your injured leg on a support 3 to 5 inches high (like a small step or block of wood). Keep your other foot flat on the floor. Shift your weight onto the injured leg on the support. Straighten your injured leg as the other leg comes off the floor. Return to the starting position by bending your injured leg and slowly lowering your uninjured leg back to the floor. Do 2 sets of 15.   Resisted ankle eversion: Sit with both legs stretched out in front of you, with your feet about a shoulder's width apart. Tie a loop in one end of elastic tubing. Put the foot of your injured leg through the loop so that the tubing goes around the arch of that foot and wraps around the outside of the other foot. Hold onto the other end of the tubing with your hand to provide tension. Turn the foot of your injured leg up and out. Make sure you keep your other foot still so that it will allow the tubing to stretch  as you move the foot of your injured leg. Return to the starting position. Do 2 sets of 15.   Balance and reach exercises: Stand next to a chair with your injured leg farther from the chair. The chair will provide support if you need it. Stand on the foot of your injured leg and bend your knee slightly. Try to raise the arch of this foot while keeping your big toe on the floor. Keep your foot in this position. With the hand that is farther away from the chair, reach forward in front of you by bending at the waist. Avoid bending your knee any more as you do this. Repeat this 10 times. To make the exercise more challenging, reach farther in front of you. Do 2 sets of 10.  the same position as above. While keeping your arch height, reach the hand that is farther away from the chair across your body toward the chair. The farther you reach, the more challenging the exercise. Do 2 sets of 10.     Resisted ankle eversion: Sit with both legs stretched out in front of you, with your feet about a shoulder's width apart. Tie a loop in one end of elastic tubing. Put the foot of your injured leg through the loop so that the tubing goes around the arch of that foot and wraps around the outside of the other foot. Hold onto the other end of the tubing with your hand to provide tension. Turn the foot of your injured leg up and out. Make sure you keep your other foot still so that it will allow the tubing to stretch as you move the foot of your injured leg. Return to the starting position. Do 2 sets of 15.   If you have access to a wobble board, do the following exercises:  Wobble board exercises:   Stand on a wobble board with your feet shoulder width apart. Rock the board forwards and backwards 30 times, then side to side 30 times. Hold on to a chair if you need support.   Rotate the wobble board around so that the edge of the board is in contact with the floor at all times. Do this 30 times in a clockwise and then a  counterclockwise direction.   Balance on the wobble board for as long as you can without letting the edges touch the floor. Try to do this for 2 minutes without touching the floor.   Rotate the wobble board in clockwise and counterclockwise circles, but do not let the edge of the board touch the floor.   When you have mastered exercises A through D, try repeating them while standing on just your injured leg.   After you are able to do these exercises on one leg, try to do them with your eyes closed. Make sure you have something nearby to support you in case you lose your balance.      Body Mass Index (BMI)  Many things can cause foot and ankle problems. Foot structure, activity level, foot mechanics and injuries are common causes of pain.  One very important issue that often goes unmentioned, is body weight. Extra weight can cause increased stress on muscles, ligaments, bones and tendons.  Sometimes just a few extra pounds is all it takes to put one over her/his threshold. Without reducing that stress, it can be difficult to alleviate pain. Some people are uncomfortable addressing this issue, but we feel it is important for you to think about it. As Foot &  Ankle specialists, our job is addressing the lower extremity problem and possible causes. Regarding extra body weight, we encourage patients to discuss diet and weight management plans with their primary care doctors. It is this team approach that gives you the best opportunity for pain relief and getting you back on your feet.

## 2017-08-29 NOTE — MR AVS SNAPSHOT
After Visit Summary   8/29/2017    Angeli Jacobson    MRN: [id number]           Patient Information     Date Of Birth          1950        Visit Information        Provider Department      8/29/2017 8:45 AM Linda Bowie DPM, Podiatry/Foot and Ankle Surgery [facility]        Today's Diagnoses     Left foot pain    -  1      Care Instructions      DR. BOWIE'S CLINIC LOCATIONS:   MONDAY AM - [place] TUES[place] - [place]   [address] [address] [place] [address] [place] [address]   [phone] / [phone]       [facility]   [address]   [place] [address] [place] [address]   [phone] / [phone]       FRIDAY PM - [place] SCHEDULE SURGERY: [phone]   [address] BILLING QUESTIONS: [phone]   [place] [address] AFTER HOURS: [phone]   [phone] / [phone] APPOINTMENTS: [phone]     Follow up in 1 month if still painful   TENDONITIS   Tendons are the strong fibrous portions ofmuscles that attach to bones and allow the muscle to move a joint when it contracts. Tendons are very strong because they have a lot of force exerted on them. Sometimes tendons can become painful because they have suffered an acute injury, in which too much force was exerted at one time, or an overuse injury, in which a normal force was exerted too frequently or over a prolonged period of time. As a result, there is damage to the tendon and its surrounding soft tissue structures and they become inflammed. Because tendons do not have a great blood supply, they do not heal rapidly and the inflammation can become chronic.   Conservative treatment for tendinitis involves rest and anti-inflammatory measures. Ice is applied 15 minutes 2-3 times daily. Anti-inflammatory medications called NSAIDs (ibuprofen, example) can be  taken provided they are used with caution, as they can lead to internal bleeding and increase the risk ofstroke and heart attack. Sometimes topical nitroglycerin is prescribed to help with pain. Often your doctor will use a special shoe or removable walking cast to immobilize the tendon, allowing it to heal without further damage from use. These devices are very useful in helping tendons heal, but they may slow you down or make you feel like your hip, knee, or back are out ofalignment. This is temporary and should go away once you are out ofthe immobilization. You should not use a walking cast when showering or driving. Another option is Platelet Rich Plasma injections. (Normally done with a Sports and Orthorapedic doctor.   If conservative measures fail, your physician may need to surgically repair the tendon by removing any chronic inflammatory tissue and sewing it back together. Sometimes it is sewn to an adjacent tendon with similar function for support and sometimes it is lengthened. . Sometimes the bones around the tendon need to be realigned or reshaped to better support the tendon or prevent further damage. Your foot and ankle surgeon will discuss the specifics of your surgery with you, should you need it.    Towel stretch: Sit on a hard surface with your injured leg stretched out in front of you. Loop a towel around your toes and the ball of your foot and pull the towel toward your body keeping your leg straight. Hold this position for 15 to 30 seconds and then relax. Repeat 3 times. Then push the towel away with the ball of your foot. Repeat 3 times.  When you don't feel much of a stretch using the towel, you can start the standing calf stretch and the following exercises.  Standing calf stretch: Stand facing a wall with your hands on the wall at about eye level. Keep your injured leg back with your heel on the floor. Keep the other leg forward with the knee bent. Turn your back foot slightly inward (as if  you were pigeon-toed). Slowly lean into the wall until you feel a stretch in the back of your calf. Hold the stretch for 15 to 30 seconds. Return to the starting position. Repeat 3 times. Do this exercise several times each day.   Standing soleus stretch: Stand facing a wall with your hands on the wall at about chest height. Keep your injured leg back with your heel on the floor. Keep the other leg forward with the knee bent. Turn your back foot slightly inward (as if you were pigeon-toed). Bend your back knee slightly and gently lean into the wall until you feel a stretch in the lower calf of your injured leg. Hold the stretch for 15 to 30 seconds. Return to the starting position. Repeat 3 times.   Achilles stretch: Stand with the ball of one foot on a stair. Reach for the step below with your heel until you feel a stretch in the arch of your foot. Hold this position for 15 to 30 seconds and then relax. Repeat 3 times.   Heel raise: Balance yourself while standing behind a chair or counter. Using the chair or counter as a support to help you, raise your body up onto your toes and hold for 5 seconds. Then slowly lower yourself down without holding onto the support. (It's OK to keep holding onto the support if you need to.) When this exercise becomes less painful, try lowering yourself down on the injured leg only. Repeat 15 times. Do 2 sets of 15. Rest 30 seconds between sets.   Step-up: Stand with the foot of your injured leg on a support 3 to 5 inches high (like a small step or block of wood). Keep your other foot flat on the floor. Shift your weight onto the injured leg on the support. Straighten your injured leg as the other leg comes off the floor. Return to the starting position by bending your injured leg and slowly lowering your uninjured leg back to the floor. Do 2 sets of 15.   Resisted ankle eversion: Sit with both legs stretched out in front of you, with your feet about a shoulder's width apart. Tie a  loop in one end of elastic tubing. Put the foot of your injured leg through the loop so that the tubing goes around the arch of that foot and wraps around the outside of the other foot. Hold onto the other end of the tubing with your hand to provide tension. Turn the foot of your injured leg up and out. Make sure you keep your other foot still so that it will allow the tubing to stretch as you move the foot of your injured leg. Return to the starting position. Do 2 sets of 15.   Balance and reach exercises: Stand next to a chair with your injured leg farther from the chair. The chair will provide support if you need it. Stand on the foot of your injured leg and bend your knee slightly. Try to raise the arch of this foot while keeping your big toe on the floor. Keep your foot in this position. With the hand that is farther away from the chair, reach forward in front of you by bending at the waist. Avoid bending your knee any more as you do this. Repeat this 10 times. To make the exercise more challenging, reach farther in front of you. Do 2 sets of 10.  the same position as above. While keeping your arch height, reach the hand that is farther away from the chair across your body toward the chair. The farther you reach, the more challenging the exercise. Do 2 sets of 10.     Resisted ankle eversion: Sit with both legs stretched out in front of you, with your feet about a shoulder's width apart. Tie a loop in one end of elastic tubing. Put the foot of your injured leg through the loop so that the tubing goes around the arch of that foot and wraps around the outside of the other foot. Hold onto the other end of the tubing with your hand to provide tension. Turn the foot of your injured leg up and out. Make sure you keep your other foot still so that it will allow the tubing to stretch as you move the foot of your injured leg. Return to the starting position. Do 2 sets of 15.   If you have access to a wobble  board, do the following exercises:  Wobble board exercises:   Stand on a wobble board with your feet shoulder width apart. Rock the board forwards and backwards 30 times, then side to side 30 times. Hold on to a chair if you need support.   Rotate the wobble board around so that the edge of the board is in contact with the floor at all times. Do this 30 times in a clockwise and then a counterclockwise direction.   Balance on the wobble board for as long as you can without letting the edges touch the floor. Try to do this for 2 minutes without touching the floor.   Rotate the wobble board in clockwise and counterclockwise circles, but do not let the edge of the board touch the floor.   When you have mastered exercises A through D, try repeating them while standing on just your injured leg.   After you are able to do these exercises on one leg, try to do them with your eyes closed. Make sure you have something nearby to support you in case you lose your balance.      Body Mass Index (BMI)  Many things can cause foot and ankle problems. Foot structure, activity level, foot mechanics and injuries are common causes of pain.  One very important issue that often goes unmentioned, is body weight. Extra weight can cause increased stress on muscles, ligaments, bones and tendons.  Sometimes just a few extra pounds is all it takes to put one over her/his threshold. Without reducing that stress, it can be difficult to alleviate pain. Some people are uncomfortable addressing this issue, but we feel it is important for you to think about it. As Foot &  Ankle specialists, our job is addressing the lower extremity problem and possible causes. Regarding extra body weight, we encourage patients to discuss diet and weight management plans with their primary care doctors. It is this team approach that gives you the best opportunity for pain relief and getting you back on your feet.                  Follow-ups after your visit         Your next 10 appointments already scheduled     Sep 28, 2017 11:00 AM CDT   Return Visit with Ramez Beal MD   [facility] ([address]  [place] [address] [phone]              Who to contact     If you have questions or need follow up information about today's clinic visit or your schedule please contact [facility] directly at [phone].  Normal or non-critical lab and imaging results will be communicated to you by MyChart, letter or phone within 4 business days after the clinic has received the results. If you do not hear from us within 7 days, please contact the clinic through [employer] or phone. If you have a critical or abnormal lab result, we will notify you by phone as soon as possible.  Submit refill requests through [email or web address] or call your pharmacy and they will forward the refill request to us. Please allow 3 business days for your refill to be completed.          Additional Information About Your Visit        [employer] Information     [email or web address] gives you secure access to your electronic health record. If you see a primary care provider, you can also send messages to your care team and make appointments. If you have questions, please call your primary care clinic.  If you do not have a primary care provider, please call [phone] and they will assist you.        Care EveryWhere ID     This is your Care EveryWhere ID. This could be used by other organizations to access your [facility] medical records  [id number]        Your Vitals Were     Height Last Period BMI (Body Mass Index)             5' (1.524 m) 03/18/2005 28.12 kg/m2          Blood Pressure from Last 3 Encounters:   08/29/17 118/76   08/24/17 112/74   06/27/17 143/89    Weight from Last 3 Encounters:   08/29/17 144 lb (65.3 kg)   08/24/17 144 lb 12.8 oz (65.7 kg)   06/27/17 146 lb (66.2 kg)               Primary Care Provider Office Phone # Fax #    [place]  Gloria Burris PA-C [phone]       [address] [facility] [address]        Equal Access to Services     KERON OCASIO : Hadii aad ku hadbonniebrook Patyangel, carrolldevi schulermarleneha, lia kamalika camejo, radames magallon laNickokat hyman. So [facility] [phone].    ATENCIÓN: Si habla español, tiene a carver disposición servicios gratuitos de asistencia lingüística. Llame al [phone].    We comply with applicable federal civil rights laws and [place] laws. We do not discriminate on the basis of race, color, national origin, age, disability sex, sexual orientation or gender identity.            Thank you!     Thank you for choosing [facility]  for your care. Our goal is always to provide you with excellent care. Hearing back from our patients is one way we can continue to improve our services. Please take a few minutes to complete the written survey that you may receive in the mail after your visit with us. Thank you!             Your Updated Medication List - Protect others around you: Learn how to safely use, store and throw away your medicines at www.disposemymeds.org.          This list is accurate as of: 8/29/17  9:05 AM.  Always use your most recent med list.                   Brand Name Dispense Instructions for use Diagnosis    aspirin 81 MG tablet      Take 1 tablet by mouth daily.    Mixed hyperlipidemia       atorvastatin 80 MG tablet    LIPITOR    90 tablet    Take 1 tablet (80 mg) by mouth daily    Hyperlipidemia LDL goal <160       buPROPion 300 MG 24 hr tablet    WELLBUTRIN XL    90 tablet    Take 1 tablet (300 mg) by mouth every morning    Major depression in complete remission (H)       estradiol 0.1 MG/GM cream    ESTRACE VAGINAL    0.5 g    Place 2 g vaginally three times a week    Atrophic vaginitis       FISH OIL PO      Take  by mouth.        fluticasone 50 MCG/ACT spray    FLONASE    1 Bottle    Spray 1-2 sprays into both nostrils daily    Hoarseness        Multi-vitamin Tabs tablet   Generic drug:  multivitamin, therapeutic with minerals     30    1 TABLET DAILY    Routine general medical examination at a health care facility       * order for DME     2 Device    Equipment being ordered: lymphedema bandages    Personal history of malignant neoplasm of other site, Other lymphedema       * order for DME     1 each    Equipment being ordered: Left Thigh High Compression Stocking, 550 CCL.3    Myxoid liposarcoma (H)       * Notice:  This list has 2 medication(s) that are the same as other medications prescribed for you. Read the directions carefully, and ask your doctor or other care provider to review them with you.

## 2017-08-29 NOTE — NURSING NOTE
Chief Complaint   Patient presents with     RECHECK     past fxs on left foot        Initial /76  Ht 5' (1.524 m)  Wt 144 lb (65.3 kg)  LMP 03/18/2005  BMI 28.12 kg/m2 Estimated body mass index is 28.12 kg/(m^2) as calculated from the following:    Height as of this encounter: 5' (1.524 m).    Weight as of this encounter: 144 lb (65.3 kg).  Medication Reconciliation: complete   Carlos Barnett MA

## 2017-08-29 NOTE — PROGRESS NOTES
PATIENT HISTORY:  Angeli Jacobson is a 67 year old female who presents to clinic for pain to the left foot. Notes it has been going on for a bout a month. Denies injury but has history of fractures on the left foot. Marika is 3/10. Worse on uneven ground. Has not done anything for it. Would like to know if it is another fracture.     Review of Systems:  Patient denies fever, chills, rash, wound, stiffness,numbness, weakness, heart burn, blood in stool, chest pain with activity, calf pain when walking, shortness of breath with activity, chronic cough, easy bleeding/bruising, swelling of ankles, excessive thirst, fatigue, depression, anxiety.  Patient admits to limping at times. .     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    CHRONIC LEFT THIGH LYMPHEDEMA     Nontoxic multinodular goiter 2005    needs yearly US     Osteoporosis, unspecified 2001     Other lymphedema 2000    left thigh, gets regular PT for this     SHINGLES 2001     Sprain of lumbosacral (joint) (ligament) 1995    right     Unspecified hearing loss 1998    chronic tinnitus     Unspecified tinnitus 1998        PAST SURGICAL HISTORY:   Past Surgical History:   Procedure Laterality Date     C APPENDECTOMY      Appendectomy     C NONSPECIFIC PROCEDURE  04/2000    Open Biopsy Left Thigh Liposarcoma     COLONOSCOPY N/A 2/5/2016    Procedure: COMBINED COLONOSCOPY, SINGLE OR MULTIPLE BIOPSY/POLYPECTOMY BY BIOPSY;  Surgeon: Varun Stanley MD, MD;  Location:  GI     EXCISION  MALIG LESION>1.25CM  5/2000    Myxoid Liposarcoma       HC COLONOSCOPY THRU STOMA, DIAGNOSTIC  2006    due 2010     HC COLP CERVIX/UPPER VAGINA  07/1997    Negative     HC DILATION/CURETTAGE DIAG/THER NON OB  02/1997    Post menopausal bleeding on HRT, negative        MEDICATIONS:   Current Outpatient Prescriptions:      atorvastatin (LIPITOR) 80 MG tablet, Take 1 tablet (80 mg) by mouth daily, Disp: 90 tablet, Rfl: 3     buPROPion (WELLBUTRIN XL) 300 MG 24 hr tablet, Take 1 tablet (300 mg) by mouth every morning, Disp: 90 tablet, Rfl: 3     fluticasone (FLONASE) 50 MCG/ACT spray, Spray 1-2 sprays into both nostrils daily, Disp: 1 Bottle, Rfl: 11     estradiol (ESTRACE VAGINAL) 0.1 MG/GM cream, Place 2 g vaginally three times a week, Disp: 0.5 g, Rfl: 3     order for DME, Equipment being ordered: Left Thigh High Compression Stocking, 550 CCL.3, Disp: 1 each, Rfl: 99     ORDER FOR DME, Equipment being ordered: lymphedema bandages, Disp: 2 Device, Rfl: 1     aspirin 81 MG tablet, Take 1 tablet by mouth daily., Disp: , Rfl: 3     Omega-3 Fatty Acids (FISH OIL PO), Take  by mouth., Disp: , Rfl:      MULTI-VITAMIN OR TABS, 1 TABLET DAILY, Disp: 30, Rfl: prn     ALLERGIES:    Allergies   Allergen Reactions     Celexa [Citalopram Hydrobromide]      Decreased libido        SOCIAL HISTORY:   Social History     Social History     Marital status:      Spouse name: Chris     Number of children: 3     Years of education: 14     Occupational History     at home       None      Social History Main Topics     Smoking status: Never Smoker     Smokeless tobacco: Never Used     Alcohol use No     Drug use: No     Sexual activity: Yes     Partners: Male     Birth control/ protection: Condom     Other Topics Concern     Parent/Sibling W/ Cabg, Mi Or Angioplasty Before 65f 55m? No     Social History Narrative        FAMILY HISTORY:   Family History   Problem Relation Age of Onset     C.A.D. Father      MI 57     Alcohol/Drug  Father      etoh     Obesity Mother      OSTEOPOROSIS Mother      Lipids Brother      DOROTHYATAWANNA. Paternal Grandmother      ascvd     DIABETES Maternal Grandmother      CANCER Maternal Grandmother      C.A.D. Paternal Uncle      Mi  age 48     CANCER Maternal Aunt      pancreatic CA        EXAM:Vitals: /76  Ht 5' (1.524 m)  Wt 144 lb (65.3 kg)  LMP 2005  BMI 28.12 kg/m2  BMI= Body mass index is 28.12 kg/(m^2).    General appearance: Patient is alert and fully cooperative with history & exam.  No sign of distress is noted during the visit.     Psychiatric: Affect is pleasant & appropriate.  Patient appears motivated to improve health.     Respiratory: Breathing is regular & unlabored while sitting.     HEENT: Hearing is intact to spoken word.  Speech is clear.  No gross evidence of visual impairment that would impact ambulation.     Dermatologic: Skin is intact to both lower extremities without significant lesions, rash or abrasion.  No paronychia or evidence of soft tissue infection is noted.     Vascular: DP & PT pulses are intact & regular bilaterally.  No significant edema or varicosities noted.  CFT and skin temperature is normal to both lower extremities.     Neurologic: Lower extremity sensation is intact to light touch.  No evidence of weakness or contracture in the lower extremities.  No evidence of neuropathy.     Musculoskeletal: Patient is ambulatory without assistive device or brace.  Increase arch height. Pain on palpation of the left 5th metatarsal base and along the peroneal tendon.     RAdiographs:  I have looked at and reviewed xrays personally.  No fractures noted. Increase calcaneal inclination angle.      ASSESSMENT:    Left foot pain  Peroneal tendinitis of left lower extremity  Pes cavus, congenital     PLAN:  Reviewed patient's chart in [facility].reviewed xrays.  Reviewed and discussed causes of tendonitis.  We discussed treatments such as immobiliation, icing, stretching, heel  lifts, orthotics, physical therapy, MRI.      at this time, patient would like to try icing, stretching and orthotics. If pain continues, recommend boot and MRI.     Linda Bowie DPM, Podiatry/Foot and Ankle Surgery    Weight management plan: Patient was referred to their PCP to discuss a diet and exercise plan.

## 2017-09-05 ENCOUNTER — MYC MEDICAL ADVICE (OUTPATIENT)
Dept: FAMILY MEDICINE | Facility: CLINIC | Age: 67
End: 2017-09-05

## 2017-09-27 ENCOUNTER — OFFICE VISIT (OUTPATIENT)
Dept: UROLOGY | Facility: CLINIC | Age: 67
End: 2017-09-27
Payer: COMMERCIAL

## 2017-09-27 VITALS — SYSTOLIC BLOOD PRESSURE: 118 MMHG | HEART RATE: 72 BPM | RESPIRATION RATE: 14 BRPM | DIASTOLIC BLOOD PRESSURE: 82 MMHG

## 2017-09-27 DIAGNOSIS — N32.81 OAB (OVERACTIVE BLADDER): Primary | ICD-10-CM

## 2017-09-27 PROCEDURE — 99213 OFFICE O/P EST LOW 20 MIN: CPT | Performed by: UROLOGY

## 2017-09-27 NOTE — PROGRESS NOTES
F/u OAB, ant vag wall prolapse without MODE, atrophic vaginitis, constipation, Estrace    Compliant with cream; has also increased dietary fiber.    Reports marked improvement in bladder control, much less leakage. Quite pleased.    Denies dysuria, gross hematuria, frequency, signif incont, use of pads.        (Unable to provide urine today)    IMP:  1. OAB and prolapse, improving on behavioral mod + topical HRT    PLAN:  1. Discussed situation with patient in detail.  2. Continue present rx  3. RTC only PRN  4. 15 minutes spent with patient, 100% spent in counseling and coordination of care for OAB/prolapse.  5. Copy [place]

## 2017-09-27 NOTE — MR AVS SNAPSHOT
After Visit Summary   9/27/2017    Angeli Jacobson    MRN: [id number]           Patient Information     Date Of Birth          1950        Visit Information        Provider Department      9/27/2017 1:15 PM Ramez Beal MD [facility]        Today's Diagnoses     OAB (overactive bladder)    -  1       Follow-ups after your visit        Follow-up notes from your care team     Return if symptoms worsen or fail to improve.      Your next 10 appointments already scheduled     Oct 03, 2017  9:00 AM CDT   LAB with CR LAB   [facility] ([facility])    [address] [facility] [id number] [phone]           Patient must bring picture ID. Patient should be prepared to give a urine specimen  Please do not eat 10-12 hours before your appointment if you are coming in fasting for labs on lipids, cholesterol, or glucose (sugar). Pregnant women should follow their Care Team instructions. Water with medications is okay. Do not drink coffee or other fluids. If you have concerns about taking  your medications, please ask at office or if scheduling via [employer] send a message by clicking on Secure Messaging, Message Your Care Team.            Oct 03, 2017  9:15 AM CDT   Nurse Only with CR MERCY KASPER/LPN   [facility] ([facility])    [id number] [place] [address] [phone]            Oct 18, 2017  2:00 PM CDT   DX HIP/PELVIS/SPINE with RIDX1   [facility]    [address]  [address]  [facility] [address]              Please do not take any of the following 24 hours prior to the day of your exam: vitamins, calcium tablets, antacids.  If possible, please wear clothes without metal (snaps, zippers). A sweatsuit works well.            Oct 18, 2017  2:45 PM CDT   MA SCREENING DIGITAL BILATERAL with [id number]  "  [facility] ([facility])    [address], [address]  [facility] [address] [phone]           Do not use any powder, lotion or deodorant under your arms or on your breast. If you do, we will ask you to remove it before your exam.  Wear comfortable, two-piece clothing.  If you have any allergies, tell your care team.  Bring any previous mammograms from other facilities or have them mailed to the breast center. Three-dimensional (3D) mammograms are available at [place] locations in [facility], [facility], [facility], and [place]. [facility] locations include [place] and [facility] in [place]. Benefits of 3D mammograms include: - Improved rate of cancer detection - Decreases your chance of having to go back for more tests, which means fewer: - \"False-positive\" results (This means that there is an abnormal area but it isn't cancer.) - Invasive testing procedures, such as a biopsy or surgery - Can provide clearer images of the breast if you have dense breast tissue. 3D mammography is an optional exam that anyone can have with a 2D mammogram. It doesn't replace or take the place of a 2D mammogram. 2D mammograms remain an effective screening test for all women.  Not all insurance companies cover the cost of a 3D mammogram. Check with your insurance.              Who to contact     If you have questions or need follow up information about today's clinic visit or your schedule please contact [facility] directly at [phone].  Normal or non-critical lab and imaging results will be communicated to you by MyChart, letter or phone within 4 business days after the clinic has received the results. If you do not hear from us within 7 days, please contact the clinic through MyChart or phone. If you have a critical or abnormal lab result, we will notify you by phone as soon as possible.  Submit refill requests " through [employer] or call your pharmacy and they will forward the refill request to us. Please allow 3 business days for your refill to be completed.          Additional Information About Your Visit        [employer] Information     [employer] gives you secure access to your electronic health record. If you see a primary care provider, you can also send messages to your care team and make appointments. If you have questions, please call your primary care clinic.  If you do not have a primary care provider, please call [phone] and they will assist you.        Care EveryWhere ID     This is your Care EveryWhere ID. This could be used by other organizations to access your [facility] medical records  [id number]        Your Vitals Were     Pulse Respirations Last Period             72 14 03/18/2005          Blood Pressure from Last 3 Encounters:   09/27/17 118/82   08/29/17 118/76   08/24/17 112/74    Weight from Last 3 Encounters:   08/29/17 65.3 kg (144 lb)   08/24/17 65.7 kg (144 lb 12.8 oz)   06/27/17 66.2 kg (146 lb)              Today, you had the following     No orders found for display       Primary Care Provider Office Phone # Fax #    Sepideh Gloria Burris PA-C [phone] [address] [facility] [address]        Equal Access to Services     KERON OCASIO AH: Hadii corazon weio Soomaali, waaxda luqadaha, qaybta kaalmada adeegyada, radames hyman. So [facility] [phone].    ATENCIÓN: Si habla español, tiene a carver disposición servicios gratuitos de asistencia lingüística. ame al [phone].    We comply with applicable federal civil rights laws and [place] laws. We do not discriminate on the basis of race, color, national origin, age, disability sex, sexual orientation or gender identity.            Thank you!     Thank you for choosing [facility]  for your care. Our goal is always to provide you with excellent care. Hearing back from our  patients is one way we can continue to improve our services. Please take a few minutes to complete the written survey that you may receive in the mail after your visit with us. Thank you!             Your Updated Medication List - Protect others around you: Learn how to safely use, store and throw away your medicines at www.disposemymeds.org.          This list is accurate as of: 9/27/17  2:04 PM.  Always use your most recent med list.                   Brand Name Dispense Instructions for use Diagnosis    aspirin 81 MG tablet      Take 1 tablet by mouth daily.    Mixed hyperlipidemia       atorvastatin 80 MG tablet    LIPITOR    90 tablet    Take 1 tablet (80 mg) by mouth daily    Hyperlipidemia LDL goal <160       buPROPion 300 MG 24 hr tablet    WELLBUTRIN XL    90 tablet    Take 1 tablet (300 mg) by mouth every morning    Major depression in complete remission (H)       estradiol 0.1 MG/GM cream    ESTRACE VAGINAL    0.5 g    Place 2 g vaginally three times a week    Atrophic vaginitis       FISH OIL PO      Take  by mouth.        fluticasone 50 MCG/ACT spray    FLONASE    1 Bottle    Spray 1-2 sprays into both nostrils daily    Hoarseness       Multi-vitamin Tabs tablet   Generic drug:  multivitamin, therapeutic with minerals     30    1 TABLET DAILY    Routine general medical examination at a health care facility       * order for DME     2 Device    Equipment being ordered: lymphedema bandages    Personal history of malignant neoplasm of other site, Other lymphedema       * order for DME     1 each    Equipment being ordered: Left Thigh High Compression Stocking, 550 CCL.3    Myxoid liposarcoma (H)       * Notice:  This list has 2 medication(s) that are the same as other medications prescribed for you. Read the directions carefully, and ask your doctor or other care provider to review them with you.

## 2017-10-03 ENCOUNTER — ALLIED HEALTH/NURSE VISIT (OUTPATIENT)
Dept: NURSING | Facility: CLINIC | Age: 67
End: 2017-10-03
Payer: COMMERCIAL

## 2017-10-03 DIAGNOSIS — Z23 NEED FOR PROPHYLACTIC VACCINATION AND INOCULATION AGAINST INFLUENZA: Primary | ICD-10-CM

## 2017-10-03 DIAGNOSIS — R79.89 ELEVATED TSH: ICD-10-CM

## 2017-10-03 PROCEDURE — G0008 ADMIN INFLUENZA VIRUS VAC: HCPCS

## 2017-10-03 PROCEDURE — 84443 ASSAY THYROID STIM HORMONE: CPT | Performed by: PHYSICIAN ASSISTANT

## 2017-10-03 PROCEDURE — 36415 COLL VENOUS BLD VENIPUNCTURE: CPT | Performed by: PHYSICIAN ASSISTANT

## 2017-10-03 PROCEDURE — 99207 ZZC NO CHARGE NURSE ONLY: CPT

## 2017-10-03 PROCEDURE — 90662 IIV NO PRSV INCREASED AG IM: CPT

## 2017-10-03 NOTE — PROGRESS NOTES
Injectable Influenza Immunization Documentation      1.  Is the person to be vaccinated sick today?   No    2. Does the person to be vaccinated have an allergy to a component   of the vaccine?   No    3. Has the person to be vaccinated ever had a serious reaction   to influenza vaccine in the past?   No    4. Has the person to be vaccinated ever had Guillain-Barré syndrome?   No    Form completed by Renate Rooney M.A.

## 2017-10-04 LAB — TSH SERPL DL<=0.005 MIU/L-ACNC: 3.6 MU/L (ref 0.4–4)

## 2017-10-18 ENCOUNTER — RADIANT APPOINTMENT (OUTPATIENT)
Dept: BONE DENSITY | Facility: CLINIC | Age: 67
End: 2017-10-18
Attending: PHYSICIAN ASSISTANT
Payer: COMMERCIAL

## 2017-10-18 ENCOUNTER — HOSPITAL ENCOUNTER (OUTPATIENT)
Dept: MAMMOGRAPHY | Facility: CLINIC | Age: 67
Discharge: HOME | End: 2017-10-18
Attending: PHYSICIAN ASSISTANT | Admitting: PHYSICIAN ASSISTANT
Payer: MEDICARE

## 2017-10-18 DIAGNOSIS — Z78.0 ASYMPTOMATIC POSTMENOPAUSAL STATUS: ICD-10-CM

## 2017-10-18 DIAGNOSIS — Z12.31 VISIT FOR SCREENING MAMMOGRAM: ICD-10-CM

## 2017-10-18 PROCEDURE — 77080 DXA BONE DENSITY AXIAL: CPT | Performed by: INTERNAL MEDICINE

## 2017-10-18 PROCEDURE — G0202 SCR MAMMO BI INCL CAD: HCPCS

## 2017-10-20 ENCOUNTER — TRANSFERRED RECORDS (OUTPATIENT)
Dept: HEALTH INFORMATION MANAGEMENT | Facility: CLINIC | Age: 67
End: 2017-10-20

## 2017-10-25 ENCOUNTER — OFFICE VISIT (OUTPATIENT)
Dept: FAMILY MEDICINE | Facility: CLINIC | Age: 67
End: 2017-10-25
Payer: COMMERCIAL

## 2017-10-25 VITALS
BODY MASS INDEX: 24.08 KG/M2 | WEIGHT: 149.8 LBS | HEART RATE: 77 BPM | TEMPERATURE: 97.9 F | HEIGHT: 66 IN | OXYGEN SATURATION: 97 % | DIASTOLIC BLOOD PRESSURE: 70 MMHG | SYSTOLIC BLOOD PRESSURE: 127 MMHG

## 2017-10-25 DIAGNOSIS — R10.2 PELVIC PRESSURE IN FEMALE: ICD-10-CM

## 2017-10-25 DIAGNOSIS — N81.10 PROLAPSE OF VAGINAL WALLS: ICD-10-CM

## 2017-10-25 DIAGNOSIS — M81.0 AGE-RELATED OSTEOPOROSIS WITHOUT CURRENT PATHOLOGICAL FRACTURE: Primary | ICD-10-CM

## 2017-10-25 PROCEDURE — 99214 OFFICE O/P EST MOD 30 MIN: CPT | Performed by: PHYSICIAN ASSISTANT

## 2017-10-25 RX ORDER — ALENDRONATE SODIUM 70 MG/1
TABLET ORAL
Qty: 12 TABLET | Refills: 3 | Status: SHIPPED | OUTPATIENT
Start: 2017-10-25 | End: 2018-08-28

## 2017-10-25 NOTE — MR AVS SNAPSHOT
After Visit Summary   10/25/2017    Angeli Jacobson    MRN: [id number]           Patient Information     Date Of Birth          1950        Visit Information        Provider Department      10/25/2017 2:45 PM Sepideh Burris PA-C [facility]        Today's Diagnoses     Age-related osteoporosis without current pathological fracture    -  1    Prolapse of vaginal walls        Pelvic pressure in female           Follow-ups after your visit        Additional Services     OB/GYN REFERRAL       Your provider has referred you to:  N: [facility] [phone]   [email or web address]    Please be aware that coverage of these services is subject to the terms and limitations of your health insurance plan.  Call member services at your health plan with any benefit or coverage questions.      Please bring the following with you to your appointment:    (1) Any X-Rays, CTs or MRIs which have been performed.  Contact the facility where they were done to arrange for  prior to your scheduled appointment.   (2) List of current medications   (3) This referral request   (4) Any documents/labs given to you for this referral                  Future tests that were ordered for you today     Open Future Orders        Priority Expected Expires Ordered    *UA reflex to Microscopic and Culture ([place] and [facility] (except [place] and [place]) Routine  10/25/2018 10/25/2017            Who to contact     If you have questions or need follow up information about today's clinic visit or your schedule please contact [facility] directly at [phone].  Normal or non-critical lab and imaging results will be communicated to you by MyChart, letter or phone within 4 business days after the clinic has received the results. If you do not hear from us within 7 days, please contact the clinic through MyChart or phone. If you have a  "critical or abnormal lab result, we will notify you by phone as soon as possible.  Submit refill requests through [employer] or call your pharmacy and they will forward the refill request to us. Please allow 3 business days for your refill to be completed.          Additional Information About Your Visit        [employer] Information     [employer] gives you secure access to your electronic health record. If you see a primary care provider, you can also send messages to your care team and make appointments. If you have questions, please call your primary care clinic.  If you do not have a primary care provider, please call [phone] and they will assist you.        Care EveryWhere ID     This is your Care EveryWhere ID. This could be used by other organizations to access your [facility] medical records  [id number]        Your Vitals Were     Pulse Temperature Height Last Period Pulse Oximetry BMI (Body Mass Index)    77 97.9  F (36.6  C) (Oral) 5' 6\" (1.676 m) 03/18/2005 97% 24.18 kg/m2       Blood Pressure from Last 3 Encounters:   10/25/17 127/70   09/27/17 118/82   08/29/17 118/76    Weight from Last 3 Encounters:   10/25/17 149 lb 12.8 oz (67.9 kg)   08/29/17 144 lb (65.3 kg)   08/24/17 144 lb 12.8 oz (65.7 kg)              We Performed the Following     OB/GYN REFERRAL          Today's Medication Changes          These changes are accurate as of: 10/25/17 11:59 PM.  If you have any questions, ask your nurse or doctor.               Start taking these medicines.        Dose/Directions    alendronate 70 MG tablet   Commonly known as:  FOSAMAX   Used for:  Age-related osteoporosis without current pathological fracture   Started by:  Sepideh Burris PA-C        Take 1 tablet (70 mg) by mouth with 8oz water every 7 days 30 minutes before breakfast and remain upright during this time.   Quantity:  12 tablet   Refills:  3            Where to get your medicines      These medications were sent to [facility] " [id number] [facility] [address] [facility]  [address] [facility] [address]     Phone:  [phone]     alendronate 70 MG tablet                Primary Care Provider Office Phone # Fax #    Sepideh Gloria Burris PA-C [phone] [address]  [facility] [address]        Equal Access to Services     KERON COASIO : Hadii aad ku hadasho Soomaali, waaxda luqadaha, qaybta kaalmada adeegyada, waxay idiin hayaan adeeg khmarthash la'aan ah. So [facility] [phone].    ATENCIÓN: Si habla español, tiene a carver disposición servicios gratuitos de asistencia lingüística. Ca al [phone].    We comply with applicable federal civil rights laws and [place] laws. We do not discriminate on the basis of race, color, national origin, age, disability, sex, sexual orientation, or gender identity.            Thank you!     Thank you for choosing [facility]  for your care. Our goal is always to provide you with excellent care. Hearing back from our patients is one way we can continue to improve our services. Please take a few minutes to complete the written survey that you may receive in the mail after your visit with us. Thank you!             Your Updated Medication List - Protect others around you: Learn how to safely use, store and throw away your medicines at www.disposemymeds.org.          This list is accurate as of: 10/25/17 11:59 PM.  Always use your most recent med list.                   Brand Name Dispense Instructions for use Diagnosis    alendronate 70 MG tablet    FOSAMAX    12 tablet    Take 1 tablet (70 mg) by mouth with 8oz water every 7 days 30 minutes before breakfast and remain upright during this time.    Age-related osteoporosis without current pathological fracture       aspirin 81 MG tablet      Take 1 tablet by mouth daily.    Mixed hyperlipidemia       atorvastatin 80 MG tablet    LIPITOR    90 tablet    Take 1 tablet (80 mg) by mouth daily     Hyperlipidemia LDL goal <160       buPROPion 300 MG 24 hr tablet    WELLBUTRIN XL    90 tablet    Take 1 tablet (300 mg) by mouth every morning    Major depression in complete remission (H)       estradiol 0.1 MG/GM cream    ESTRACE VAGINAL    0.5 g    Place 2 g vaginally three times a week    Atrophic vaginitis       FISH OIL PO      Take  by mouth.        fluticasone 50 MCG/ACT spray    FLONASE    1 Bottle    Spray 1-2 sprays into both nostrils daily    Hoarseness       Multi-vitamin Tabs tablet   Generic drug:  multivitamin, therapeutic with minerals     30    1 TABLET DAILY    Routine general medical examination at a health care facility       * order for DME     2 Device    Equipment being ordered: lymphedema bandages    Personal history of malignant neoplasm of other site, Other lymphedema       * order for DME     1 each    Equipment being ordered: Left Thigh High Compression Stocking, 550 CCL.3    Myxoid liposarcoma (H)       * Notice:  This list has 2 medication(s) that are the same as other medications prescribed for you. Read the directions carefully, and ask your doctor or other care provider to review them with you.

## 2017-10-25 NOTE — PROGRESS NOTES
SUBJECTIVE:   Angeli Jacobson is a 67 year old female who presents to clinic today for the following health issues:      Patient states she thinks she may have a uterine Prolapse. Patient states she has been having vaginal pressure for the past 2 weeks. Patient states she also tried to look at her vaginal area and noticed possible tissue in the vaginal area that is not normally there. Patient states no pain,itching,blood or discharge.        Problem list and histories reviewed & adjusted, as indicated.  Additional history: as documented    Patient Active Problem List   Diagnosis     Pain in joint, multiple sites     MULTINODUL GOITER     Hearing loss     Hyperlipidemia LDL goal <160     Osteoporosis     Cervicalgia     Major depressive disorder, recurrent episode, moderate (H)     Impaired fasting glucose     Advanced directives, counseling/discussion     Lymphedema of left leg     Tubular adenoma     Other constipation     Vertigo     Past Surgical History:   Procedure Laterality Date     C APPENDECTOMY      Appendectomy     C NONSPECIFIC PROCEDURE  04/2000    Open Biopsy Left Thigh Liposarcoma     COLONOSCOPY N/A 2/5/2016    Procedure: COMBINED COLONOSCOPY, SINGLE OR MULTIPLE BIOPSY/POLYPECTOMY BY BIOPSY;  Surgeon: Varun Stanley MD, MD;  Location: [facility]     EXCISION MALIG LESION>1.25CM  5/2000    Myxoid Liposarcoma       HC COLONOSCOPY THRU STOMA, DIAGNOSTIC  2006    due 2010     HC COLP CERVIX/UPPER VAGINA  07/1997    Negative     HC DILATION/CURETTAGE DIAG/THER NON OB  02/1997    Post menopausal bleeding on HRT, negative       Social History   Substance Use Topics     Smoking status: Never Smoker     Smokeless tobacco: Never Used     Alcohol use No     Family History   Problem Relation Age of Onset     C.A.D. Father      MI 57     Alcohol/Drug Father      etoh     Obesity Mother      OSTEOPOROSIS Mother      Lipids Brother      C.A.D. Paternal Grandmother      ascvd     DIABETES Maternal Grandmother       "CANCER Maternal Grandmother      C.A.D. Paternal Uncle      Mi  age 48     CANCER Maternal Aunt      pancreatic CA             Reviewed and updated as needed this visit by clinical staffTobacco  Allergies  Med Hx  Surg Hx  Fam Hx  Soc Hx      Reviewed and updated as needed this visit by Provider         ROS:  Constitutional, HEENT, cardiovascular, pulmonary, gi and gu systems are negative, except as otherwise noted.      OBJECTIVE:   /70 (BP Location: Right arm, Patient Position: Chair, Cuff Size: Adult Regular)  Pulse 77  Temp 97.9  F (36.6  C) (Oral)  Ht 5' 6\" (1.676 m)  Wt 149 lb 12.8 oz (67.9 kg)  LMP 2005  SpO2 97%  BMI 24.18 kg/m2  Body mass index is 24.18 kg/(m^2).  GENERAL APPEARANCE: healthy, alert and no distress  ABDOMEN: soft, nontender, without hepatosplenomegaly or masses and bowel sounds normal   (female): rectal exam normal and normal external genitalia, normal urethral meatus, cervix normal, telescoping of vagina upon withdrawal of speculum        ASSESSMENT/PLAN:             1. Age-related osteoporosis without current pathological fracture  Discussed need to restart based on DEXA results.   - alendronate (FOSAMAX) 70 MG tablet; Take 1 tablet (70 mg) by mouth with 8oz water every 7 days 30 minutes before breakfast and remain upright during this time.  Dispense: 12 tablet; Refill: 3    2. Prolapse of vaginal walls  Will refer given symptoms.   Recommended [facility].   - OB/GYN REFERRAL    3. Pelvic pressure in female  Likely secondary to prolapse  - *UA reflex to Microscopic and Culture ([facility] (except [place] and [place]); Future        Sepideh Burris PA-C, PA-C  [facility]"

## 2017-10-25 NOTE — NURSING NOTE
Chief Complaint   Patient presents with     Uterine Prolapse     ?       Initial /70 (BP Location: Right arm, Patient Position: Chair, Cuff Size: Adult Regular)  Pulse 77  Temp 97.9  F (36.6  C) (Oral)  Ht 5' (1.524 m)  Wt 149 lb 12.8 oz (67.9 kg)  LMP 03/18/2005  SpO2 97%  BMI 29.26 kg/m2 Estimated body mass index is 29.26 kg/(m^2) as calculated from the following:    Height as of this encounter: 5' (1.524 m).    Weight as of this encounter: 149 lb 12.8 oz (67.9 kg).  Medication Reconciliation: complete Giselle Stanley MA  Health Maintenance has been reviewed.

## 2017-10-26 DIAGNOSIS — R10.2 PELVIC PRESSURE IN FEMALE: ICD-10-CM

## 2017-10-26 LAB
ALBUMIN UR-MCNC: NEGATIVE MG/DL
APPEARANCE UR: CLEAR
BACTERIA #/AREA URNS HPF: ABNORMAL /HPF
BILIRUB UR QL STRIP: NEGATIVE
COLOR UR AUTO: YELLOW
GLUCOSE UR STRIP-MCNC: NEGATIVE MG/DL
HGB UR QL STRIP: ABNORMAL
KETONES UR STRIP-MCNC: NEGATIVE MG/DL
LEUKOCYTE ESTERASE UR QL STRIP: ABNORMAL
NITRATE UR QL: NEGATIVE
NON-SQ EPI CELLS #/AREA URNS LPF: ABNORMAL /LPF
PH UR STRIP: 5.5 PH (ref 5–7)
RBC #/AREA URNS AUTO: ABNORMAL /HPF
SOURCE: ABNORMAL
SP GR UR STRIP: 1.02 (ref 1–1.03)
UROBILINOGEN UR STRIP-ACNC: 0.2 EU/DL (ref 0.2–1)
WBC #/AREA URNS AUTO: ABNORMAL /HPF
YEAST #/AREA URNS HPF: ABNORMAL /HPF

## 2017-10-26 PROCEDURE — 81001 URINALYSIS AUTO W/SCOPE: CPT | Performed by: PHYSICIAN ASSISTANT

## 2017-10-29 ENCOUNTER — MYC MEDICAL ADVICE (OUTPATIENT)
Dept: FAMILY MEDICINE | Facility: CLINIC | Age: 67
End: 2017-10-29

## 2017-10-29 DIAGNOSIS — R49.0 HOARSENESS: ICD-10-CM

## 2017-10-30 RX ORDER — FLUTICASONE PROPIONATE 50 MCG
1-2 SPRAY, SUSPENSION (ML) NASAL DAILY
Qty: 1 BOTTLE | Refills: 0 | Status: SHIPPED | OUTPATIENT
Start: 2017-10-30 | End: 2018-04-13

## 2017-10-30 NOTE — TELEPHONE ENCOUNTER
Disp Refills Start End ELAYNE   fluticasone (FLONASE) 50 MCG/ACT spray 1 Bottle 11 8/24/2017  --   Sig: Spray 1-2 sprays into both nostrils daily   Class: E-Prescribe

## 2017-10-30 NOTE — TELEPHONE ENCOUNTER
Prescription approved per [facility] Refill Protocol  Azul James RN BS

## 2017-11-03 ENCOUNTER — MYC MEDICAL ADVICE (OUTPATIENT)
Dept: FAMILY MEDICINE | Facility: CLINIC | Age: 67
End: 2017-11-03

## 2017-11-03 DIAGNOSIS — N30.01 ACUTE CYSTITIS WITH HEMATURIA: Primary | ICD-10-CM

## 2017-11-04 ENCOUNTER — NURSE TRIAGE (OUTPATIENT)
Dept: NURSING | Facility: CLINIC | Age: 67
End: 2017-11-04

## 2017-11-04 RX ORDER — CIPROFLOXACIN 500 MG/1
500 TABLET, FILM COATED ORAL 2 TIMES DAILY
Qty: 14 TABLET | Refills: 0 | Status: SHIPPED | OUTPATIENT
Start: 2017-11-04 | End: 2018-04-13

## 2017-11-04 NOTE — TELEPHONE ENCOUNTER
"  Additional Information    Negative: [1] Caller is not with the adult (patient) AND [2] reporting urgent symptoms    Negative: Lab result questions    Negative: Medication questions    Negative: Caller cannot be reached by phone    Negative: Caller has already spoken to PCP or another triager    Negative: RN needs further essential information from caller in order to complete triage    Negative: Requesting regular office appointment    Negative: [1] Caller requesting NON-URGENT health information AND [2] PCP's office is the best resource    Health Information question, no triage required and triager able to answer question    Answer Assessment - Initial Assessment Questions  1. REASON FOR CALL or QUESTION: \"What is your reason for calling today?\" or \"How can I best help you?\" or \"What question do you have that I can help answer?\"      [place] and has UTI symptoms.    Protocols used: INFORMATION ONLY CALL-ADULT-    "

## 2017-11-04 NOTE — TELEPHONE ENCOUNTER
"Dr. Stanton-see [employer] message below.  Saw Sepideh 10/25/17 for prolapse.  Negative U/A 10/26/17.  Also called triage line 5:06 am today but I do not see what she was told?  Please advise.  Reyna Luciano, RN    Notes Recorded by Sepideh Burris PA-C on 10/26/2017 at 4:15 PM  Angeli,    Your urine test was normal.    ~Sepideh    10/25/17  ASSESSMENT/PLAN:       1. Age-related osteoporosis without current pathological fracture  Discussed need to restart based on DEXA results.   - alendronate (FOSAMAX) 70 MG tablet; Take 1 tablet (70 mg) by mouth with 8oz water every 7 days 30 minutes before breakfast and remain upright during this time.  Dispense: 12 tablet; Refill: 3     2. Prolapse of vaginal walls  Will refer given symptoms.   Recommended [facility].   - OB/GYN REFERRAL     3. Pelvic pressure in female  Likely secondary to prolapse  - *UA reflex to Microscopic and Culture ([facility] (except [place] and [place]); Future     Sepideh Burris PA-C, COURTNEY  [facility]     Marsha Lozoya RN        11/4/17 5:06 AM   Note         Additional Information    Negative: [1] Caller is not with the adult (patient) AND [2] reporting urgent symptoms    Negative: Lab result questions    Negative: Medication questions    Negative: Caller cannot be reached by phone    Negative: Caller has already spoken to PCP or another triager    Negative: RN needs further essential information from caller in order to complete triage    Negative: Requesting regular office appointment    Negative: [1] Caller requesting NON-URGENT health information AND [2] PCP's office is the best resource    Health Information question, no triage required and triager able to answer question    Answer Assessment - Initial Assessment Questions  1. REASON FOR CALL or QUESTION: \"What is your reason for calling today?\" or \"How can I best help you?\" or \"What question do you have that I can help " "answer?\"      Is out of state and has UTI symptoms.    Protocols used: INFORMATION ONLY CALL-ADULT-AH             "

## 2017-11-04 NOTE — TELEPHONE ENCOUNTER
Her UA was negative for infection - I agree with Sepideh, but looks like she has a long hx of UTI. Perhaps it was too early to detect an evolving UTI, will send abx.     I assume I am sending to [facility] in [place], [place]???    If not please send script to pharmacy near her.     She can try AZO for symptom relief until abx kick in, usually 2-3 doses.     JACKSON

## 2017-11-07 ENCOUNTER — TRANSFERRED RECORDS (OUTPATIENT)
Dept: HEALTH INFORMATION MANAGEMENT | Facility: CLINIC | Age: 67
End: 2017-11-07

## 2018-01-02 ENCOUNTER — MYC REFILL (OUTPATIENT)
Dept: UROLOGY | Facility: CLINIC | Age: 68
End: 2018-01-02

## 2018-01-02 DIAGNOSIS — N95.2 ATROPHIC VAGINITIS: ICD-10-CM

## 2018-01-03 RX ORDER — ESTRADIOL 0.1 MG/G
2 CREAM VAGINAL
Qty: 0.5 G | Refills: 3 | Status: ON HOLD | OUTPATIENT
Start: 2018-01-03 | End: 2018-05-03

## 2018-01-03 NOTE — TELEPHONE ENCOUNTER
Signed Prescriptions:                        Disp   Refills    estradiol (ESTRACE VAGINAL) 0.1 MG/GM cream0.5 g  3        Sig: Place 2 g vaginally three times a week  Authorizing Provider: ROB DOMINGUEZ  Ordering User: JESSE RIVERA RN

## 2018-01-14 ENCOUNTER — MYC MEDICAL ADVICE (OUTPATIENT)
Dept: FAMILY MEDICINE | Facility: CLINIC | Age: 68
End: 2018-01-14

## 2018-01-15 NOTE — TELEPHONE ENCOUNTER
OV, or E visit if possible, if pt does E visit, we can do UA in the office too.  Mei Hess MD  [facility]  [phone]

## 2018-04-06 ENCOUNTER — OFFICE VISIT (OUTPATIENT)
Dept: PODIATRY | Facility: CLINIC | Age: 68
End: 2018-04-06
Payer: COMMERCIAL

## 2018-04-06 VITALS
WEIGHT: 145 LBS | SYSTOLIC BLOOD PRESSURE: 118 MMHG | HEART RATE: 70 BPM | HEIGHT: 66 IN | BODY MASS INDEX: 23.3 KG/M2 | DIASTOLIC BLOOD PRESSURE: 64 MMHG

## 2018-04-06 DIAGNOSIS — I89.0 LYMPHEDEMA OF BOTH LOWER EXTREMITIES: ICD-10-CM

## 2018-04-06 DIAGNOSIS — L60.0 INGROWN NAIL OF GREAT TOE OF LEFT FOOT: ICD-10-CM

## 2018-04-06 DIAGNOSIS — M79.672 LEFT FOOT PAIN: Primary | ICD-10-CM

## 2018-04-06 PROCEDURE — 11750 EXCISION NAIL&NAIL MATRIX: CPT | Mod: T5 | Performed by: PODIATRIST

## 2018-04-06 PROCEDURE — 99213 OFFICE O/P EST LOW 20 MIN: CPT | Mod: 25 | Performed by: PODIATRIST

## 2018-04-06 RX ORDER — CEPHALEXIN 500 MG/1
500 CAPSULE ORAL 2 TIMES DAILY
Qty: 20 CAPSULE | Refills: 0 | Status: SHIPPED | OUTPATIENT
Start: 2018-04-06 | End: 2018-04-24

## 2018-04-06 ASSESSMENT — PAIN SCALES - GENERAL: PAINLEVEL: NO PAIN (0)

## 2018-04-06 NOTE — MR AVS SNAPSHOT
After Visit Summary   4/6/2018    Angeli Jacobson    MRN: [id number]           Patient Information     Date Of Birth          1950        Visit Information        Provider Department      4/6/2018 1:15 PM Linda Bowie DPM, Podiatry/Foot and Ankle Surgery [facility]        Care Instructions    Thank you for choosing [facility]!    DR. BOWIE'S CLINIC SCHEDULE  MONDAY AM - [place] TUESDAY - [place]   [address] [place] [address] [place] [address]   [phone] / [phone] / [phone]       WEDNESDAY - [place] FRIDAY AM - [facility]   [address]   [place] [address] [place] [address]   [phone] / [phone]       FRIDAY PM - [place] SCHEDULE SURGERY: [phone]   [address] BILLING QUESTIONS: [phone]   [place] [address] AFTER HOURS: [phone] / [phone] APPOINTMENTS: [phone]     Follow up as needed    INGROWN TOENAILS  When a toenail is ingrown, it is curved and grows into the skin, usually at the nail borders (the sides of the nail). This  digging in  of the nail irritates the skin, often creating pain, redness, swelling, and warmth in the toe.  If an ingrown nail causes a break in the skin, bacteria may enter and cause an infection in the area, which is often marked by drainage and a foul odor. However, even if the toe isn t painful, red, swollen, or warm, a nail that curves downward into the skin can progress to an infection.  CAUSES:  Heredity: In many people, the tendency for ingrown toenails is inherited.   Trauma: Sometimes an ingrown toenail is the result of trauma, such as stubbing your toe, having an object fall on your toe, or engaging in activities that involve repeated pressure on the toes, such as kicking or running.   Improper Trimming:  The most common cause of  ingrown toenails is cutting your nails too short. This encourages the skin next to the nail to fold over the nail.   Improperly Sized Footwear: Ingrown toenails can result from wearing socks and shoes that are tight or short.   Nail Conditions: Ingrown toenails can be caused by nail problems, such as fungal infections or losing a nail due to trauma.   TREATMENT: Sometimes initial treatment for ingrown toenails can be safely performed at home. However, home treatment is strongly discouraged if an infection is suspected, or for those who have medical conditions that put feet at high risk, such as diabetes, nerve damage in the foot, or poor circulation.  Home care: If you don t have an infection or any of the above medical conditions, you can soak your foot in room-temperature water (adding Epsom s salt may be recommended by your doctor), and gently massage the side of the nail fold to help reduce the inflammation.  Avoid attempting  bathroom surgery.  Repeated cutting of the nail can cause the condition to worsen over time. If your symptoms fail to improve, it s time to see a foot and ankle surgeon.  Physician care: After examining the toe, the foot and ankle surgeon will select the treatment best suited for you. If an infection is present, an oral antibiotic may be prescribed.  Sometimes a minor surgical procedure, often performed in the office, will ease the pain and remove the offending nail. After applying a local anesthetic, the doctor removes part of the nail s side border. Some nails may become ingrown again, requiring removal of the nail root.  Following the nail procedure, a light bandage will be applied. Most people experience very little pain after surgery and may resume normal activity the next day. If your surgeon has prescribed an oral antibiotic, be sure to take all the medication, even if your symptoms have improved.  PREVENTION:  Proper Trimming: Cut toenails in a fairly straight line, and don t cut  them too short. You should be able to get your fingernail under the sides and end of the nail.   Well-fitting Footwear: Don t wear shoes that are short or tight in the toe area. Avoid shoes that are loose, because they too cause pressure on the toes, especially when running or walking briskly.     INGROWN TOENAIL REMOVAL AFTERCARE     Go directly home and elevate the affected foot on one or two pillows for the remainder of the day/evening if possible. Your toe may stay numb anywhere from 2-8 hours.     Take Tylenol, ibuprofen or another anti-inflammatory as needed for pain.     Take antibiotic if that has been prescribed. Finish the entire prescribed antibiotic even if your symptoms have improved.     The evening of the procedure, soak/wash the affected area in warm water (you may add Epsom salt) for 5 to 10 minutes. Do this twice a day for 2-4 weeks (6-8 weeks if you had phenol) (you may count showering/bathing as one soak).  After soaks, pat the area dry and then allow to airdry for a few minutes. Apply antibiotic ointment to the area and cover with 2 X 2 gauze and paper tape or band-aid.    You may pursue everyday activities as tolerated with either an open toe shoe or cut-out shoe as needed or you may wear regular shoes if no pain is noted.    Watch for any signs and symptoms of infection such as: redness, red streaks going up the foot/leg, swelling, pus or foul odor. Those that have had the phenol procedure, the toe will drain longer and will look like it is infected because it is a chemical burn.     Please call with questions.    Body Mass Index (BMI)  Many things can cause foot and ankle problems. Foot structure, activity level, foot mechanics and injuries are common causes of pain.  One very important issue that often goes unmentioned, is body weight. Extra weight can cause increased stress on muscles, ligaments, bones and tendons.  Sometimes just a few extra pounds is all it takes to put one over her/his  threshold. Without reducing that stress, it can be difficult to alleviate pain. Some people are uncomfortable addressing this issue, but we feel it is important for you to think about it. As Foot &  Ankle specialists, our job is addressing the lower extremity problem and possible causes. Regarding extra body weight, we encourage patients to discuss diet and weight management plans with their primary care doctors. It is this team approach that gives you the best opportunity for pain relief and getting you back on your feet.                           Follow-ups after your visit        Your next 10 appointments already scheduled     Apr 13, 2018  2:00 PM CDT   MyChart Long with Sepideh Burris PA-C   [facility]    [address] [place] [id number] [phone]              Who to contact     If you have questions or need follow up information about today's clinic visit or your schedule please contact [facility] directly at [phone].  Normal or non-critical lab and imaging results will be communicated to you by MyChart, letter or phone within 4 business days after the clinic has received the results. If you do not hear from us within 7 days, please contact the clinic through [employer] or phone. If you have a critical or abnormal lab result, we will notify you by phone as soon as possible.  Submit refill requests through [employer] or call your pharmacy and they will forward the refill request to us. Please allow 3 business days for your refill to be completed.          Additional Information About Your Visit        MyChart Information     [employer] gives you secure access to your electronic health record. If you see a primary care provider, you can also send messages to your care team and make appointments. If you have questions, please call your primary care clinic.  If you do not have a primary care provider, please call [phone]  "and they will assist you.        Care EveryWhere ID     This is your Care EveryWhere ID. This could be used by other organizations to access your [place] medical records  [id number]        Your Vitals Were     Pulse Height Last Period BMI (Body Mass Index)          70 5' 6\" (1.676 m) 03/18/2005 23.4 kg/m2         Blood Pressure from Last 3 Encounters:   04/06/18 118/64   10/25/17 127/70   09/27/17 118/82    Weight from Last 3 Encounters:   04/06/18 145 lb (65.8 kg)   10/25/17 149 lb 12.8 oz (67.9 kg)   08/29/17 144 lb (65.3 kg)              Today, you had the following     No orders found for display       Primary Care Provider Office Phone # Fax #    Sepideh Burris PA-C [phone] [address] [facility] [address]        Equal Access to Services     DAI OCASIO : Hadii corazon weio Soangel, waaxda luqadaha, qaybta kaalmada adeegyada, radames hatch . So [facility] [phone].    ATENCIÓN: Si ortegala español, tiene a carver disposición servicios gratuitos de asistencia lingüística. Llame al [phone].    We comply with applicable federal civil rights laws and [place] laws. We do not discriminate on the basis of race, color, national origin, age, disability, sex, sexual orientation, or gender identity.            Thank you!     Thank you for choosing [facility]  for your care. Our goal is always to provide you with excellent care. Hearing back from our patients is one way we can continue to improve our services. Please take a few minutes to complete the written survey that you may receive in the mail after your visit with us. Thank you!             Your Updated Medication List - Protect others around you: Learn how to safely use, store and throw away your medicines at www.disposemymeds.org.          This list is accurate as of 4/6/18  1:23 PM.  Always use your most recent med list.                   Brand Name Dispense Instructions for use " Diagnosis    alendronate 70 MG tablet    FOSAMAX    12 tablet    Take 1 tablet (70 mg) by mouth with 8oz water every 7 days 30 minutes before breakfast and remain upright during this time.    Age-related osteoporosis without current pathological fracture       aspirin 81 MG tablet      Take 1 tablet by mouth daily.    Mixed hyperlipidemia       atorvastatin 80 MG tablet    LIPITOR    90 tablet    Take 1 tablet (80 mg) by mouth daily    Hyperlipidemia LDL goal <160       buPROPion 300 MG 24 hr tablet    WELLBUTRIN XL    90 tablet    Take 1 tablet (300 mg) by mouth every morning    Major depression in complete remission (H)       ciprofloxacin 500 MG tablet    CIPRO    14 tablet    Take 1 tablet (500 mg) by mouth 2 times daily    Acute cystitis with hematuria       estradiol 0.1 MG/GM cream    ESTRACE VAGINAL    0.5 g    Place 2 g vaginally three times a week    Atrophic vaginitis       FISH OIL PO      Take  by mouth.        fluticasone 50 MCG/ACT spray    FLONASE    1 Bottle    Spray 1-2 sprays into both nostrils daily    Hoarseness       Multi-vitamin Tabs tablet   Generic drug:  multivitamin, therapeutic with minerals     30    1 TABLET DAILY    Routine general medical examination at a health care facility       order for DME     2 Device    Equipment being ordered: lymphedema bandages    Personal history of malignant neoplasm of other site, Other lymphedema       order for DME     1 each    Equipment being ordered: Left Thigh High Compression Stocking, 550 CCL.3    Myxoid liposarcoma (H)

## 2018-04-06 NOTE — NURSING NOTE
"Chief Complaint   Patient presents with     RECHECK     Toenail     left hallux / x 2 weeks       Initial /64  Pulse 70  Ht 5' 6\" (1.676 m)  Wt 145 lb (65.8 kg)  LMP 03/18/2005  BMI 23.4 kg/m2 Estimated body mass index is 23.4 kg/(m^2) as calculated from the following:    Height as of this encounter: 5' 6\" (1.676 m).    Weight as of this encounter: 145 lb (65.8 kg).  Medication Reconciliation: complete    "

## 2018-04-06 NOTE — LETTER
4/6/2018         RE: Angeli Jacobson  [address]  [facility] [id number]        Dear Colleague,    Thank you for referring your patient, Angeli Jacobson, to the [facility]. Please see a copy of my visit note below.    PATIENT HISTORY:  Angeli Jacobson is a 67 year old female who presents to clinic for pain to left great toe. Notes that it has been sore for last few weeks, mostly with pressure or the bedsheets touching it. Pain is 5/10. Nail is more discolored. Would like to know what it causing it and what can be done for this.     Review of Systems:  Patient denies fever, chills, rash, wound, stiffness, limping, numbness, weakness, heart burn, blood in stool, chest pain with activity, calf pain when walking, shortness of breath with activity, chronic cough, easy bleeding/bruising, swelling of ankles, excessive thirst, fatigue, depression, anxiety.       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    CHRONIC LEFT THIGH LYMPHEDEMA     Nontoxic multinodular goiter 2005    needs yearly US     Osteoporosis, unspecified 2001     Other lymphedema 2000    left thigh, gets regular PT for this     SHINGLES 2001     Sprain of lumbosacral (joint) (ligament) 1995    right     Unspecified hearing loss 1998    chronic tinnitus     Unspecified tinnitus 1998        PAST SURGICAL HISTORY:   Past Surgical History:   Procedure Laterality Date     C APPENDECTOMY      Appendectomy     C NONSPECIFIC PROCEDURE   04/2000    Open Biopsy Left Thigh Liposarcoma     COLONOSCOPY N/A 2/5/2016    Procedure: COMBINED COLONOSCOPY, SINGLE OR MULTIPLE BIOPSY/POLYPECTOMY BY BIOPSY;  Surgeon: Varun Stanley MD, MD;  Location: [facility]     EXCISION MALIG LESION>1.25CM  5/2000    Myxoid Liposarcoma       HC COLONOSCOPY THRU STOMA, DIAGNOSTIC  2006    due 2010     HC COLP CERVIX/UPPER VAGINA  07/1997    Negative     HC DILATION/CURETTAGE DIAG/THER NON OB  02/1997    Post menopausal bleeding on HRT, negative        MEDICATIONS:   Current Outpatient Prescriptions:      cephALEXin (KEFLEX) 500 MG capsule, Take 1 capsule (500 mg) by mouth 2 times daily, Disp: 20 capsule, Rfl: 0     estradiol (ESTRACE VAGINAL) 0.1 MG/GM cream, Place 2 g vaginally three times a week, Disp: 0.5 g, Rfl: 3     ciprofloxacin (CIPRO) 500 MG tablet, Take 1 tablet (500 mg) by mouth 2 times daily, Disp: 14 tablet, Rfl: 0     fluticasone (FLONASE) 50 MCG/ACT spray, Spray 1-2 sprays into both nostrils daily, Disp: 1 Bottle, Rfl: 0     alendronate (FOSAMAX) 70 MG tablet, Take 1 tablet (70 mg) by mouth with 8oz water every 7 days 30 minutes before breakfast and remain upright during this time., Disp: 12 tablet, Rfl: 3     atorvastatin (LIPITOR) 80 MG tablet, Take 1 tablet (80 mg) by mouth daily, Disp: 90 tablet, Rfl: 3     buPROPion (WELLBUTRIN XL) 300 MG 24 hr tablet, Take 1 tablet (300 mg) by mouth every morning, Disp: 90 tablet, Rfl: 3     order for DME, Equipment being ordered: Left Thigh High Compression Stocking, 550 CCL.3, Disp: 1 each, Rfl: 99     ORDER FOR DME, Equipment being ordered: lymphedema bandages, Disp: 2 Device, Rfl: 1     aspirin 81 MG tablet, Take 1 tablet by mouth daily., Disp: , Rfl: 3     Omega-3 Fatty Acids (FISH OIL PO), Take  by mouth., Disp: , Rfl:      MULTI-VITAMIN OR TABS, 1 TABLET DAILY, Disp: 30, Rfl: prn     ALLERGIES:    Allergies   Allergen Reactions     Celexa [Citalopram Hydrobromide]      Decreased libido        SOCIAL HISTORY:   Social  "History     Social History     Marital status:      Spouse name: Chris     Number of children: 3     Years of education: 14     Occupational History     at home       None      Social History Main Topics     Smoking status: Never Smoker     Smokeless tobacco: Never Used     Alcohol use No     Drug use: No     Sexual activity: Yes     Partners: Male     Birth control/ protection: Condom     Other Topics Concern     Parent/Sibling W/ Cabg, Mi Or Angioplasty Before 65f 55m? No     Social History Narrative        FAMILY HISTORY:   Family History   Problem Relation Age of Onset     C.A.D. Father      MI 57     Alcohol/Drug Father      etoh     Obesity Mother      OSTEOPOROSIS Mother      Lipids Brother      C.A.D. Paternal Grandmother      ascvd     DIABETES Maternal Grandmother      CANCER Maternal Grandmother      C.A.D. Paternal Uncle      Mi  age 48     CANCER Maternal Aunt      pancreatic CA        EXAM:Vitals: /64  Pulse 70  Ht 5' 6\" (1.676 m)  Wt 145 lb (65.8 kg)  LMP 2005  BMI 23.4 kg/m2  BMI= Body mass index is 23.4 kg/(m^2).    General appearance: Patient is alert and fully cooperative with history & exam.  No sign of distress is noted during the visit.     Psychiatric: Affect is pleasant & appropriate.  Patient appears motivated to improve health.     Respiratory: Breathing is regular & unlabored while sitting.     HEENT: Hearing is intact to spoken word.  Speech is clear.  No gross evidence of visual impairment that would impact ambulation.     Dermatologic: Skin is intact to both lower extremities without significant lesions, rash or abrasion.  No paronychia or evidence of soft tissue infection is noted.     Vascular: DP & PT pulses are intact & regular bilaterally.  No significant edema or varicosities noted.  CFT and skin temperature is normal to both lower extremities.     Neurologic: Lower extremity sensation is intact to light touch.  No evidence of weakness or " contracture in the lower extremities.  No evidence of neuropathy.     Musculoskeletal: Patient is ambulatory without assistive device or brace.  No gross ankle deformity noted.  No foot or ankle joint effusion is noted.     ASSESSMENT:    Left foot pain  Ingrown nail of great toe of left foot  Lymphedema of both lower extremities       PLAN:  Reviewed patient's chart in [facility].  The potential causes and nature of an ingrown toenail were discussed with the patient.  We reviewed the natural history/prognosis of the condition and potential risks if no treatment is provided.      Treatment options discussed included conservative management (oral antibiotics, soaking of foot, adequate width shoes)  as well as surgical management (partial or total nail removal).  The pros and cons of both forms of treatment were reviewed.      After thorough discussion and answering all questions, the patient elected to have the border removed. She will soak the foot 2x a day for 2 weeks and apply antibiotic ointment and bandage.     Procedure: After verbal consent, the right big toe was anesthetized with 5cc's of 1% lidocaine plain. A tourniquet was applied to the toe. The medial border was then raised from the nail bed and then cut the length of the nail.  The offending nail border was then removed.  Three 30 second applications of phenol were applied to the nail bed and nail matrix.  The area was then flushed with copious amounts of alcohol.  Bacitracin was applied to the nail bed.  The tourniquet was removed.  Bandage was applied to the toe.  The patient tolerated the procedure and anesthesia well.    Linda Bowie DPM, Podiatry/Foot and Ankle Surgery        Patient to follow up with Primary Care provider regarding elevated blood pressure.        Again, thank you for allowing me to participate in the care of your patient.        Sincerely,        Linda Bowie DPM, Podiatry/Foot and Ankle Surgery

## 2018-04-06 NOTE — PATIENT INSTRUCTIONS
Thank you for choosing [facility]!    DR. ARIAS'S CLINIC SCHEDULE  MONDAY AM - [place] TUESDAY - [place]   [address] [address] [address] [address] [place] [address]   [phone] / [phone] / [phone]       WEDNESDAY - [place] FRIDAY AM - [facility]   [address]   [address] [address] [place] [address]   [phone] / [phone]       FRIDAY PM - [place] SCHEDULE SURGERY: [phone]   [address] BILLING QUESTIONS: [phone]   [address] [address] AFTER HOURS: [phone]   [phone] / [phone] APPOINTMENTS: [phone]     Follow up as needed    INGROWN TOENAILS  When a toenail is ingrown, it is curved and grows into the skin, usually at the nail borders (the sides of the nail). This  digging in  of the nail irritates the skin, often creating pain, redness, swelling, and warmth in the toe.  If an ingrown nail causes a break in the skin, bacteria may enter and cause an infection in the area, which is often marked by drainage and a foul odor. However, even if the toe isn t painful, red, swollen, or warm, a nail that curves downward into the skin can progress to an infection.  CAUSES:  Heredity: In many people, the tendency for ingrown toenails is inherited.   Trauma: Sometimes an ingrown toenail is the result of trauma, such as stubbing your toe, having an object fall on your toe, or engaging in activities that involve repeated pressure on the toes, such as kicking or running.   Improper Trimming:  The most common cause of ingrown toenails is cutting your nails too short. This encourages the skin next to the nail to fold over the nail.   Improperly Sized Footwear: Ingrown toenails can result from wearing socks and shoes that are tight or short.   Nail Conditions: Ingrown toenails can be caused by nail problems, such as fungal infections or losing a nail  due to trauma.   TREATMENT: Sometimes initial treatment for ingrown toenails can be safely performed at home. However, home treatment is strongly discouraged if an infection is suspected, or for those who have medical conditions that put feet at high risk, such as diabetes, nerve damage in the foot, or poor circulation.  Home care: If you don t have an infection or any of the above medical conditions, you can soak your foot in room-temperature water (adding Epsom s salt may be recommended by your doctor), and gently massage the side of the nail fold to help reduce the inflammation.  Avoid attempting  bathroom surgery.  Repeated cutting of the nail can cause the condition to worsen over time. If your symptoms fail to improve, it s time to see a foot and ankle surgeon.  Physician care: After examining the toe, the foot and ankle surgeon will select the treatment best suited for you. If an infection is present, an oral antibiotic may be prescribed.  Sometimes a minor surgical procedure, often performed in the office, will ease the pain and remove the offending nail. After applying a local anesthetic, the doctor removes part of the nail s side border. Some nails may become ingrown again, requiring removal of the nail root.  Following the nail procedure, a light bandage will be applied. Most people experience very little pain after surgery and may resume normal activity the next day. If your surgeon has prescribed an oral antibiotic, be sure to take all the medication, even if your symptoms have improved.  PREVENTION:  Proper Trimming: Cut toenails in a fairly straight line, and don t cut them too short. You should be able to get your fingernail under the sides and end of the nail.   Well-fitting Footwear: Don t wear shoes that are short or tight in the toe area. Avoid shoes that are loose, because they too cause pressure on the toes, especially when running or walking briskly.     INGROWN TOENAIL REMOVAL AFTERCARE      Go directly home and elevate the affected foot on one or two pillows for the remainder of the day/evening if possible. Your toe may stay numb anywhere from 2-8 hours.     Take Tylenol, ibuprofen or another anti-inflammatory as needed for pain.     Take antibiotic if that has been prescribed. Finish the entire prescribed antibiotic even if your symptoms have improved.     The evening of the procedure, soak/wash the affected area in warm water (you may add Epsom salt) for 5 to 10 minutes. Do this twice a day for 2-4 weeks (6-8 weeks if you had phenol) (you may count showering/bathing as one soak).  After soaks, pat the area dry and then allow to airdry for a few minutes. Apply antibiotic ointment to the area and cover with 2 X 2 gauze and paper tape or band-aid.    You may pursue everyday activities as tolerated with either an open toe shoe or cut-out shoe as needed or you may wear regular shoes if no pain is noted.    Watch for any signs and symptoms of infection such as: redness, red streaks going up the foot/leg, swelling, pus or foul odor. Those that have had the phenol procedure, the toe will drain longer and will look like it is infected because it is a chemical burn.     Please call with questions.    Body Mass Index (BMI)  Many things can cause foot and ankle problems. Foot structure, activity level, foot mechanics and injuries are common causes of pain.  One very important issue that often goes unmentioned, is body weight. Extra weight can cause increased stress on muscles, ligaments, bones and tendons.  Sometimes just a few extra pounds is all it takes to put one over her/his threshold. Without reducing that stress, it can be difficult to alleviate pain. Some people are uncomfortable addressing this issue, but we feel it is important for you to think about it. As Foot &  Ankle specialists, our job is addressing the lower extremity problem and possible causes. Regarding extra body weight, we encourage  patients to discuss diet and weight management plans with their primary care doctors. It is this team approach that gives you the best opportunity for pain relief and getting you back on your feet.

## 2018-04-06 NOTE — PROGRESS NOTES
PATIENT HISTORY:  Angeli Jacobson is a 67 year old female who presents to clinic for pain to left great toe. Notes that it has been sore for last few weeks, mostly with pressure or the bedsheets touching it. Pain is 5/10. Nail is more discolored. Would like to know what it causing it and what can be done for this.     Review of Systems:  Patient denies fever, chills, rash, wound, stiffness, limping, numbness, weakness, heart burn, blood in stool, chest pain with activity, calf pain when walking, shortness of breath with activity, chronic cough, easy bleeding/bruising, swelling of ankles, excessive thirst, fatigue, depression, anxiety.       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    CHRONIC LEFT THIGH LYMPHEDEMA     Nontoxic multinodular goiter 2005    needs yearly      Osteoporosis, unspecified 2001     Other lymphedema 2000    left thigh, gets regular PT for this     SHINGLES 2001     Sprain of lumbosacral (joint) (ligament) 1995    right     Unspecified hearing loss 1998    chronic tinnitus     Unspecified tinnitus 1998        PAST SURGICAL HISTORY:   Past Surgical History:   Procedure Laterality Date     C APPENDECTOMY      Appendectomy     C NONSPECIFIC PROCEDURE  04/2000    Open Biopsy Left Thigh Liposarcoma     COLONOSCOPY N/A 2/5/2016    Procedure: COMBINED COLONOSCOPY, SINGLE OR MULTIPLE BIOPSY/POLYPECTOMY BY BIOPSY;  Surgeon: Varun Stanley MD, MD;  Location: [facility]     EXCISION MALIG LESION>1.25CM  5/2000    Myxoid  Liposarcoma       HC COLONOSCOPY THRU STOMA, DIAGNOSTIC  2006    due 2010     HC COLP CERVIX/UPPER VAGINA  07/1997    Negative     HC DILATION/CURETTAGE DIAG/THER NON OB  02/1997    Post menopausal bleeding on HRT, negative        MEDICATIONS:   Current Outpatient Prescriptions:      cephALEXin (KEFLEX) 500 MG capsule, Take 1 capsule (500 mg) by mouth 2 times daily, Disp: 20 capsule, Rfl: 0     estradiol (ESTRACE VAGINAL) 0.1 MG/GM cream, Place 2 g vaginally three times a week, Disp: 0.5 g, Rfl: 3     ciprofloxacin (CIPRO) 500 MG tablet, Take 1 tablet (500 mg) by mouth 2 times daily, Disp: 14 tablet, Rfl: 0     fluticasone (FLONASE) 50 MCG/ACT spray, Spray 1-2 sprays into both nostrils daily, Disp: 1 Bottle, Rfl: 0     alendronate (FOSAMAX) 70 MG tablet, Take 1 tablet (70 mg) by mouth with 8oz water every 7 days 30 minutes before breakfast and remain upright during this time., Disp: 12 tablet, Rfl: 3     atorvastatin (LIPITOR) 80 MG tablet, Take 1 tablet (80 mg) by mouth daily, Disp: 90 tablet, Rfl: 3     buPROPion (WELLBUTRIN XL) 300 MG 24 hr tablet, Take 1 tablet (300 mg) by mouth every morning, Disp: 90 tablet, Rfl: 3     order for DME, Equipment being ordered: Left Thigh High Compression Stocking, 550 CCL.3, Disp: 1 each, Rfl: 99     ORDER FOR DME, Equipment being ordered: lymphedema bandages, Disp: 2 Device, Rfl: 1     aspirin 81 MG tablet, Take 1 tablet by mouth daily., Disp: , Rfl: 3     Omega-3 Fatty Acids (FISH OIL PO), Take  by mouth., Disp: , Rfl:      MULTI-VITAMIN OR TABS, 1 TABLET DAILY, Disp: 30, Rfl: prn     ALLERGIES:    Allergies   Allergen Reactions     Celexa [Citalopram Hydrobromide]      Decreased libido        SOCIAL HISTORY:   Social History     Social History     Marital status:      Spouse name: Chris     Number of children: 3     Years of education: 14     Occupational History     at home       None      Social History Main Topics     Smoking status: Never Smoker     Smokeless  "tobacco: Never Used     Alcohol use No     Drug use: No     Sexual activity: Yes     Partners: Male     Birth control/ protection: Condom     Other Topics Concern     Parent/Sibling W/ Cabg, Mi Or Angioplasty Before 65f 55m? No     Social History Narrative        FAMILY HISTORY:   Family History   Problem Relation Age of Onset     C.A.D. Father      MI 57     Alcohol/Drug Father      etoh     Obesity Mother      OSTEOPOROSIS Mother      Lipids Brother      C.A.KARYN. Paternal Grandmother      ascvd     DIABETES Maternal Grandmother      CANCER Maternal Grandmother      C.A.D. Paternal Uncle      Mi  age 48     CANCER Maternal Aunt      pancreatic CA        EXAM:Vitals: /64  Pulse 70  Ht 5' 6\" (1.676 m)  Wt 145 lb (65.8 kg)  LMP 2005  BMI 23.4 kg/m2  BMI= Body mass index is 23.4 kg/(m^2).    General appearance: Patient is alert and fully cooperative with history & exam.  No sign of distress is noted during the visit.     Psychiatric: Affect is pleasant & appropriate.  Patient appears motivated to improve health.     Respiratory: Breathing is regular & unlabored while sitting.     HEENT: Hearing is intact to spoken word.  Speech is clear.  No gross evidence of visual impairment that would impact ambulation.     Dermatologic: Skin is intact to both lower extremities without significant lesions, rash or abrasion.  No paronychia or evidence of soft tissue infection is noted.     Vascular: DP & PT pulses are intact & regular bilaterally.  No significant edema or varicosities noted.  CFT and skin temperature is normal to both lower extremities.     Neurologic: Lower extremity sensation is intact to light touch.  No evidence of weakness or contracture in the lower extremities.  No evidence of neuropathy.     Musculoskeletal: Patient is ambulatory without assistive device or brace.  No gross ankle deformity noted.  No foot or ankle joint effusion is noted.     ASSESSMENT:    Left foot pain  Ingrown nail " of great toe of left foot  Lymphedema of both lower extremities       PLAN:  Reviewed patient's chart in [facility].  The potential causes and nature of an ingrown toenail were discussed with the patient.  We reviewed the natural history/prognosis of the condition and potential risks if no treatment is provided.      Treatment options discussed included conservative management (oral antibiotics, soaking of foot, adequate width shoes)  as well as surgical management (partial or total nail removal).  The pros and cons of both forms of treatment were reviewed.      After thorough discussion and answering all questions, the patient elected to have the border removed. She will soak the foot 2x a day for 2 weeks and apply antibiotic ointment and bandage.     Procedure: After verbal consent, the right big toe was anesthetized with 5cc's of 1% lidocaine plain. A tourniquet was applied to the toe. The medial border was then raised from the nail bed and then cut the length of the nail.  The offending nail border was then removed.  Three 30 second applications of phenol were applied to the nail bed and nail matrix.  The area was then flushed with copious amounts of alcohol.  Bacitracin was applied to the nail bed.  The tourniquet was removed.  Bandage was applied to the toe.  The patient tolerated the procedure and anesthesia well.    Linda Bowie DPM, Podiatry/Foot and Ankle Surgery        Patient to follow up with Primary Care provider regarding elevated blood pressure.

## 2018-04-13 ENCOUNTER — OFFICE VISIT (OUTPATIENT)
Dept: FAMILY MEDICINE | Facility: CLINIC | Age: 68
End: 2018-04-13
Payer: COMMERCIAL

## 2018-04-13 VITALS
DIASTOLIC BLOOD PRESSURE: 79 MMHG | WEIGHT: 152 LBS | RESPIRATION RATE: 14 BRPM | HEART RATE: 96 BPM | SYSTOLIC BLOOD PRESSURE: 136 MMHG | BODY MASS INDEX: 24.53 KG/M2 | TEMPERATURE: 98.1 F

## 2018-04-13 DIAGNOSIS — I89.0 LYMPHEDEMA OF LEFT LEG: ICD-10-CM

## 2018-04-13 DIAGNOSIS — R29.898 FATIGUE OF LOWER EXTREMITY: Primary | ICD-10-CM

## 2018-04-13 PROCEDURE — 99214 OFFICE O/P EST MOD 30 MIN: CPT | Performed by: PHYSICIAN ASSISTANT

## 2018-04-13 RX ORDER — FUROSEMIDE 20 MG
20 TABLET ORAL DAILY
Qty: 7 TABLET | Refills: 0 | Status: SHIPPED | OUTPATIENT
Start: 2018-04-13 | End: 2018-04-24

## 2018-04-13 NOTE — MR AVS SNAPSHOT
After Visit Summary   4/13/2018    Angeli Jacobson    MRN: [id number]           Patient Information     Date Of Birth          1950        Visit Information        Provider Department      4/13/2018 2:00 PM Sepideh Burris PA-C [facility]        Today's Diagnoses     Need for prophylactic vaccination with tetanus-diphtheria (TD)    -  1       Follow-ups after your visit        Who to contact     If you have questions or need follow up information about today's clinic visit or your schedule please contact [facility] directly at [phone].  Normal or non-critical lab and imaging results will be communicated to you by [email or web address], letter or phone within 4 business days after the clinic has received the results. If you do not hear from us within 7 days, please contact the clinic through [email or web address] or phone. If you have a critical or abnormal lab result, we will notify you by phone as soon as possible.  Submit refill requests through im3D or call your pharmacy and they will forward the refill request to us. Please allow 3 business days for your refill to be completed.          Additional Information About Your Visit        MyChart Information     im3D gives you secure access to your electronic health record. If you see a primary care provider, you can also send messages to your care team and make appointments. If you have questions, please call your primary care clinic.  If you do not have a primary care provider, please call [phone] and they will assist you.        Care EveryWhere ID     This is your Care EveryWhere ID. This could be used by other organizations to access your [facility] medical records  [id number]        Your Vitals Were     Pulse Temperature Respirations Last Period BMI (Body Mass Index)       96 98.1  F (36.7  C) (Oral) 14 03/18/2005 24.53 kg/m2        Blood Pressure from Last 3 Encounters:   04/13/18 136/79   04/06/18 118/64    10/25/17 127/70    Weight from Last 3 Encounters:   04/13/18 152 lb (68.9 kg)   04/06/18 145 lb (65.8 kg)   10/25/17 149 lb 12.8 oz (67.9 kg)              Today, you had the following     No orders found for display       Primary Care Provider Office Phone # Fax #    Sepideh MITCH Burris PA-C [phone] [address] [facility] [address]        Equal Access to Services     DAI OCASIO : Hadii aad ku hadasho Soomaali, waaxda luqadaha, qaybta kaalmada adeegyada, waxay idiin hayaan adeeg kharacolt lacharmaine . So [facility] [phone].    ATENCIÓN: Si habla español, tiene a carver disposición servicios gratuitos de asistencia lingüística. Llame al [phone].    We comply with applicable federal civil rights laws and [place] laws. We do not discriminate on the basis of race, color, national origin, age, disability, sex, sexual orientation, or gender identity.            Thank you!     Thank you for choosing [facility]  for your care. Our goal is always to provide you with excellent care. Hearing back from our patients is one way we can continue to improve our services. Please take a few minutes to complete the written survey that you may receive in the mail after your visit with us. Thank you!             Your Updated Medication List - Protect others around you: Learn how to safely use, store and throw away your medicines at www.disposemymeds.org.          This list is accurate as of 4/13/18  2:07 PM.  Always use your most recent med list.                   Brand Name Dispense Instructions for use Diagnosis    alendronate 70 MG tablet    FOSAMAX    12 tablet    Take 1 tablet (70 mg) by mouth with 8oz water every 7 days 30 minutes before breakfast and remain upright during this time.    Age-related osteoporosis without current pathological fracture       aspirin 81 MG tablet      Take 1 tablet by mouth daily.    Mixed hyperlipidemia       atorvastatin 80 MG tablet    LIPITOR    90  tablet    Take 1 tablet (80 mg) by mouth daily    Hyperlipidemia LDL goal <160       buPROPion 300 MG 24 hr tablet    WELLBUTRIN XL    90 tablet    Take 1 tablet (300 mg) by mouth every morning    Major depression in complete remission (H)       cephALEXin 500 MG capsule    KEFLEX    20 capsule    Take 1 capsule (500 mg) by mouth 2 times daily    Left foot pain, Ingrown nail of great toe of left foot, Lymphedema of both lower extremities       estradiol 0.1 MG/GM cream    ESTRACE VAGINAL    0.5 g    Place 2 g vaginally three times a week    Atrophic vaginitis       FISH OIL PO      Take  by mouth.        Multi-vitamin Tabs tablet   Generic drug:  multivitamin, therapeutic with minerals     30    1 TABLET DAILY    Routine general medical examination at a health care facility       order for DME     2 Device    Equipment being ordered: lymphedema bandages    Personal history of malignant neoplasm of other site, Other lymphedema       order for DME     1 each    Equipment being ordered: Left Thigh High Compression Stocking, 550 CCL.3    Myxoid liposarcoma (H)

## 2018-04-14 ASSESSMENT — PATIENT HEALTH QUESTIONNAIRE - PHQ9: SUM OF ALL RESPONSES TO PHQ QUESTIONS 1-9: 3

## 2018-04-16 ENCOUNTER — MYC MEDICAL ADVICE (OUTPATIENT)
Dept: FAMILY MEDICINE | Facility: CLINIC | Age: 68
End: 2018-04-16

## 2018-04-16 DIAGNOSIS — I73.9 PAD (PERIPHERAL ARTERY DISEASE) (H): ICD-10-CM

## 2018-04-16 DIAGNOSIS — C49.9 MYXOID LIPOSARCOMA (H): Primary | ICD-10-CM

## 2018-04-16 DIAGNOSIS — I89.0 LYMPHEDEMA OF LEFT LEG: ICD-10-CM

## 2018-04-20 ENCOUNTER — MYC MEDICAL ADVICE (OUTPATIENT)
Dept: FAMILY MEDICINE | Facility: CLINIC | Age: 68
End: 2018-04-20

## 2018-04-20 ENCOUNTER — HOSPITAL ENCOUNTER (OUTPATIENT)
Dept: ULTRASOUND IMAGING | Facility: CLINIC | Age: 68
Discharge: HOME | End: 2018-04-20
Attending: PHYSICIAN ASSISTANT | Admitting: PHYSICIAN ASSISTANT
Payer: MEDICARE

## 2018-04-20 ENCOUNTER — TELEPHONE (OUTPATIENT)
Dept: FAMILY MEDICINE | Facility: CLINIC | Age: 68
End: 2018-04-20

## 2018-04-20 DIAGNOSIS — I73.9 PAD (PERIPHERAL ARTERY DISEASE) (H): Primary | ICD-10-CM

## 2018-04-20 DIAGNOSIS — C49.9 MYXOID LIPOSARCOMA (H): ICD-10-CM

## 2018-04-20 DIAGNOSIS — I73.9 PAD (PERIPHERAL ARTERY DISEASE) (H): ICD-10-CM

## 2018-04-20 DIAGNOSIS — I89.0 LYMPHEDEMA OF LEFT LEG: ICD-10-CM

## 2018-04-20 PROCEDURE — 93924 LWR XTR VASC STDY BILAT: CPT

## 2018-04-20 NOTE — TELEPHONE ENCOUNTER
Called pt and discussed ADRIAN.  Referred to vascular for consult.    discussed with Dr. Hess, who was in agreement with plan.    Pt taking statin and ASA.

## 2018-04-21 NOTE — TELEPHONE ENCOUNTER
She can do as much activity as she wants, stop if pain occur in the left leg, but actually walking and activity is very good for her, it will improve her disease.  Mei Hess MD  [facility]  [phone]

## 2018-04-21 NOTE — TELEPHONE ENCOUNTER
Dr. Hess-see Hamilton Thornehart message below.  Please advise.  ANNEMARIE Dumont Courtney N, PA-C contacted Angeli ALBA. Sepideh Mcfarland PA-C        4/20/18 5:20 PM   Note      Called pt and discussed ADRIAN.  Referred to vascular for consult.     discussed with Dr. Hess, who was in agreement with plan.     Pt taking statin and ASA.            4/20/18      IMPRESSION: Moderate arterial insufficiency on the left becomes severe  following exercise. There are normal ABIs on the right.     CLAY NEELY MD

## 2018-04-23 ENCOUNTER — TELEPHONE (OUTPATIENT)
Dept: OTHER | Facility: CLINIC | Age: 68
End: 2018-04-23

## 2018-04-23 NOTE — TELEPHONE ENCOUNTER
"Pt referred to [facility] by Sepideh Burris PA-C for PAD    4-20-18 Bilateral ADRIAN with exercise, Dr. Lemus notes:  \"HISTORY: Myxoid liposarcoma (H). Lymphedema of left leg. PAD  (peripheral artery disease) (H).\"    \"FINDINGS: On the right, there are triphasic waveforms and ankle  brachial index of 1.21. On the left, there are biphasic/monophasic  waveforms with an ankle-brachial index of 0.59. Patient walked on a  treadmill for 5 minutes at 1.5 miles per hour and a 10% grade.  Following exercise, ankle-brachial index is 1.21 on the right and 0.39  on the left.       IMPRESSION: Moderate arterial insufficiency on the left becomes severe  following exercise. There are normal ABIs on the right.\"    Pt needs to be scheduled for consult with vascular surgery or IR .  Will route to scheduling to coordinate an appointment at next available.    CHERISE McmahonN, RN  "

## 2018-04-24 ENCOUNTER — TELEPHONE (OUTPATIENT)
Dept: OTHER | Facility: CLINIC | Age: 68
End: 2018-04-24

## 2018-04-24 ENCOUNTER — OFFICE VISIT (OUTPATIENT)
Dept: OTHER | Facility: CLINIC | Age: 68
End: 2018-04-24
Attending: SURGERY
Payer: COMMERCIAL

## 2018-04-24 VITALS
DIASTOLIC BLOOD PRESSURE: 73 MMHG | WEIGHT: 146 LBS | SYSTOLIC BLOOD PRESSURE: 131 MMHG | HEIGHT: 67 IN | BODY MASS INDEX: 22.91 KG/M2 | HEART RATE: 80 BPM

## 2018-04-24 DIAGNOSIS — Z95.828 HISTORY OF ARTERIAL BYPASS OF LOWER EXTREMITY: Primary | ICD-10-CM

## 2018-04-24 PROCEDURE — 99204 OFFICE O/P NEW MOD 45 MIN: CPT | Mod: ZP | Performed by: SURGERY

## 2018-04-24 PROCEDURE — G0463 HOSPITAL OUTPT CLINIC VISIT: HCPCS

## 2018-04-24 ASSESSMENT — ENCOUNTER SYMPTOMS
GASTROINTESTINAL NEGATIVE: 1
EYES NEGATIVE: 1
CONSTITUTIONAL NEGATIVE: 1
RESPIRATORY NEGATIVE: 1
CLAUDICATION: 1
PSYCHIATRIC NEGATIVE: 1
MUSCULOSKELETAL NEGATIVE: 1
NEUROLOGICAL NEGATIVE: 1

## 2018-04-24 NOTE — PROGRESS NOTES
"  HPI:  Angeli Jacobson is a 68-year-old female with hyperlipidemia referred at this time for evaluation of left leg claudication after ambulating less than 2 blocks.  She was in her usual state of health until 2-1/2 months ago when she fell landing on both of her knees.  Since that event, she has complained of the left leg \"fatigue\" with associated left calf cramping after less than 2 blocks.    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  The surgeon was Dr. Diaz and the procedure was performed at the [facility].  At the time of this encounter I have no records of that procedure.    Mrs. Jacobson is a nondiabetic and she has never smoked.  She has not had scheduled surveillance of the above-noted PTFE graft.  She is retired.  She does suffer from moderate left leg chronic lymphedema.  She utilizes lymphedema bandaging and compression garments on a daily basis.        Review of Systems   Constitutional: Negative.    HENT: Negative.    Eyes: Negative.    Respiratory: Negative.    Cardiovascular: Positive for claudication.   Gastrointestinal: Negative.    Genitourinary: Negative.    Musculoskeletal: Negative.    Skin: Negative.    Neurological: Negative.    Endo/Heme/Allergies: Negative.    Psychiatric/Behavioral: Negative.          Physical Exam   Nursing note and vitals reviewed.  Constitutional: She appears well-developed and well-nourished.   Eyes: EOM are normal. Pupils are equal, round, and reactive to light.   HENT:   Head: Normocephalic and atraumatic.   Neck: No JVD present.   Cardiovascular:   Pulses:       Radial pulses are 2+ on the right side        Femoral pulses are 2+ on the right side, and 2+ on the left side.       Popliteal pulses are 0 on the left side.        Dorsalis pedis pulses are 1+ on the right side, and 0 on the left side.        Posterior tibial pulses are 2+ " on the right side, and 0 on the left side.   Weak monophasic left posterior tibial Doppler signal.  Strong monophasic left dorsalis pedis Doppler signal.  Bilateral greater saphenous veins not well visualized   Musculoskeletal: She exhibits edema.   Circumference of right mid calf 32.5 cm.  Circumference of mid left calf 42 cm.   Neurological: She is alert and oriented to person, place, and time. No cranial nerve deficit. Coordination normal.   Skin: Skin is warm and dry.   Psychiatric/Behavioral: mood and affect normal, normal behavior, normal thought content and normal judgment     Imaging:  US ADRIAN DOPPLER WITH EXERCISE BILATERAL   4/20/2018 1:55 PM      HISTORY: Myxoid liposarcoma (H). Lymphedema of left leg. PAD  (peripheral artery disease) (H).     COMPARISON: None     FINDINGS: On the right, there are triphasic waveforms and ankle  brachial index of 1.21. On the left, there are biphasic/monophasic  waveforms with an ankle-brachial index of 0.59. Patient walked on a  treadmill for 5 minutes at 1.5 miles per hour and a 10% grade.  Following exercise, ankle-brachial index is 1.21 on the right and 0.39  on the left.         IMPRESSION: Moderate arterial insufficiency on the left becomes severe  following exercise. There are normal ABIs on the right.     CLAY NEELY MD        IMPRESSION:  Left leg claudication likely secondary to occlusion of 18-year-old proximal left thigh PTFE interposition graft.  By history her symptoms have been present for 2-1/2 months.  Chronic moderate to severe left leg lymphedema related to prior sarcoma resection in 2000.  No signs or symptoms of critical limb ischemia.    RECOMMENDATION:  I had a lengthy discussion with Mrs. Jacobson reviewing all the above.  I have reassured her that her symptoms while lifestyle limiting are not immediately limb threatening.  She likely has subacute occlusion of the left PTFE interposition graft.  I presume this graft involves the proximal left SFA  but I do not know this for sure.  I will arrange for bilateral lower extremity greater saphenous vein mapping and a left leg angiogram with possible intervention.  We discussed the high likelihood that the graft will be nonsalvageable.  In that case, we would need to consider a possible redo left leg bypass.  All questions were answered and she verbalizes understanding.  Left leg angiogram will be scheduled in a timely manner at [facility].  She may continue her aspirin uninterrupted.    Total face-to-face time was 45 minutes, greater than 50% spent providing counseling and education.    Elijah Owens M.D.

## 2018-04-24 NOTE — LETTER
"[facility] [place] [address]  [place] [address]  Phone: [phone]  Fax: [phone]    2018    Re: Angeli Jacobson, : 1950    HPI:  Angeli Jacobson is a 68-year-old female with hyperlipidemia referred at this time for evaluation of left leg claudication after ambulating less than 2 blocks.  She was in her usual state of health until 2-1/2 months ago when she fell landing on both of her knees.  Since that event, she has complained of the left leg \"fatigue\" with associated left calf cramping after less than 2 blocks.     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  The surgeon was Dr. Nestor Canas and the procedure was performed at the [facility].  At the time of this encounter I have no records of that procedure.     Mrs. Jacobson is a nondiabetic and she has never smoked.  She has not had scheduled surveillance of the above-noted PTFE graft.  She is retired.  She does suffer from moderate left leg chronic lymphedema.  She utilizes lymphedema bandaging and compression garments on a daily basis.     Review of Systems   Constitutional: Negative.    HENT: Negative.    Eyes: Negative.    Respiratory: Negative.    Cardiovascular: Positive for claudication.   Gastrointestinal: Negative.    Genitourinary: Negative.    Musculoskeletal: Negative.    Skin: Negative.    Neurological: Negative.    Endo/Heme/Allergies: Negative.    Psychiatric/Behavioral: Negative.       Physical Exam   Nursing note and vitals reviewed.  Constitutional: She appears well-developed and well-nourished.   Eyes: EOM are normal. Pupils are equal, round, and reactive to light.   HENT:   Head: Normocephalic and atraumatic.   Neck: No JVD present.   Cardiovascular:   Pulses:       Radial pulses are 2+ on the right side        Femoral pulses are 2+ on the right " side, and 2+ on the left side.       Popliteal pulses are 0 on the left side.        Dorsalis pedis pulses are 1+ on the right side, and 0 on the left side.        Posterior tibial pulses are 2+ on the right side, and 0 on the left side.   Weak monophasic left posterior tibial Doppler signal.  Strong monophasic left dorsalis pedis Doppler signal.  Bilateral greater saphenous veins not well visualized   Musculoskeletal: She exhibits edema.   Circumference of right mid calf 32.5 cm.  Circumference of mid left calf 42 cm.   Neurological: She is alert and oriented to person, place, and time. No cranial nerve deficit. Coordination normal.   Skin: Skin is warm and dry.   Psychiatric/Behavioral: mood and affect normal, normal behavior, normal thought content and normal judgment      Imaging:  US ADRIAN DOPPLER WITH EXERCISE BILATERAL   4/20/2018 1:55 PM       HISTORY: Myxoid liposarcoma (H). Lymphedema of left leg. PAD  (peripheral artery disease) (H).      COMPARISON: None      FINDINGS: On the right, there are triphasic waveforms and ankle  brachial index of 1.21. On the left, there are biphasic/monophasic  waveforms with an ankle-brachial index of 0.59. Patient walked on a  treadmill for 5 minutes at 1.5 miles per hour and a 10% grade.  Following exercise, ankle-brachial index is 1.21 on the right and 0.39  on the left.      IMPRESSION: Moderate arterial insufficiency on the left becomes severe  following exercise. There are normal ABIs on the right.      CLAY NEELY MD     IMPRESSION:  Left leg claudication likely secondary to occlusion of 18-year-old proximal left thigh PTFE interposition graft.  By history her symptoms have been present for 2-1/2 months.  Chronic moderate to severe left leg lymphedema related to prior sarcoma resection in 2000.  No signs or symptoms of critical limb ischemia.     RECOMMENDATION:  I had a lengthy discussion with Mrs. Jacobson reviewing all the above.  I have reassured her that her  symptoms while lifestyle limiting are not immediately limb threatening.  She likely has subacute occlusion of the left PTFE interposition graft.  I presume this graft involves the proximal left SFA but I do not know this for sure.  I will arrange for bilateral lower extremity greater saphenous vein mapping and a left leg angiogram with possible intervention.  We discussed the high likelihood that the graft will be nonsalvageable.  In that case, we would need to consider a possible redo left leg bypass.  All questions were answered and she verbalizes understanding.  Left leg angiogram will be scheduled in a timely manner at [facility].  She may continue her aspirin uninterrupted.     Elijah Owens M.D.

## 2018-04-24 NOTE — MR AVS SNAPSHOT
After Visit Summary   4/24/2018    Angeli Jacobson    MRN: [id number]           Patient Information     Date Of Birth          1950        Visit Information        Provider Department      4/24/2018 1:45 PM Shade Owens MD [facility]      Today's Diagnoses     History of arterial bypass of lower extremity    -  1       Follow-ups after your visit        Follow-up notes from your care team     Return in about 1 week (around 5/1/2018) for Left leg angiogram with possible intervention.      Your next 10 appointments already scheduled     Apr 27, 2018 10:20 AM CDT   Office Visit with Denisse Benitezs,    [facility])    [address] [place] [address] [phone]           Bring a current list of meds and any records pertaining to this visit. For Physicals, please bring immunization records and any forms needing to be filled out. Please arrive 10 minutes early to complete paperwork.              Who to contact     If you have questions or need follow up information about today's clinic visit or your schedule please contact [facility] directly at [phone].  Normal or non-critical lab and imaging results will be communicated to you by MyChart, letter or phone within 4 business days after the clinic has received the results. If you do not hear from us within 7 days, please contact the clinic through trinkethart or phone. If you have a critical or abnormal lab result, we will notify you by phone as soon as possible.  Submit refill requests through Flossonic or call your pharmacy and they will forward the refill request to us. Please allow 3 business days for your refill to be completed.          Additional Information About Your Visit        [email or web address] New Media Information     Flossonic gives you secure access to your electronic  "health record. If you see a primary care provider, you can also send messages to your care team and make appointments. If you have questions, please call your primary care clinic.  If you do not have a primary care provider, please call [phone] and they will assist you.        Care EveryWhere ID     This is your Care EveryWhere ID. This could be used by other organizations to access your [facility] medical records  [id number]        Your Vitals Were     Pulse Height Last Period BMI (Body Mass Index)          80 5' 7\" (1.702 m) 03/18/2005 22.87 kg/m2         Blood Pressure from Last 3 Encounters:   04/24/18 131/73   04/13/18 136/79   04/06/18 118/64    Weight from Last 3 Encounters:   04/24/18 146 lb (66.2 kg)   04/13/18 152 lb (68.9 kg)   04/06/18 145 lb (65.8 kg)              Today, you had the following     No orders found for display         Today's Medication Changes          These changes are accurate as of 4/24/18  3:17 PM.  If you have any questions, ask your nurse or doctor.               Stop taking these medicines if you haven't already. Please contact your care team if you have questions.     cephALEXin 500 MG capsule   Commonly known as:  KEFLEX   Stopped by:  Shade Owens MD           furosemide 20 MG tablet   Commonly known as:  LASIX   Stopped by:  Shade Owens MD                    Primary Care Provider Office Phone # Fax #    Sepideh N COURTNEY Burris [phone]       [address] [facility] [address]        Equal Access to Services     [facility] : Hadii corazon ku hadasho Soomaali, waaxda luqadaha, qaybta kaalmada ademicah, radames hatch . So [facility] [phone].    ATENCIÓN: Si habla español, tiene a carver disposición servicios gratuitos de asistencia lingüística. Llame al [phone].    We comply with applicable federal civil rights laws and [place] laws. We do not discriminate on the basis of race, color, national origin, age, " disability, sex, sexual orientation, or gender identity.            Thank you!     Thank you for choosing [facility] [place]  for your care. Our goal is always to provide you with excellent care. Hearing back from our patients is one way we can continue to improve our services. Please take a few minutes to complete the written survey that you may receive in the mail after your visit with us. Thank you!             Your Updated Medication List - Protect others around you: Learn how to safely use, store and throw away your medicines at www.disposemymeds.org.          This list is accurate as of 4/24/18  3:17 PM.  Always use your most recent med list.                   Brand Name Dispense Instructions for use Diagnosis    alendronate 70 MG tablet    FOSAMAX    12 tablet    Take 1 tablet (70 mg) by mouth with 8oz water every 7 days 30 minutes before breakfast and remain upright during this time.    Age-related osteoporosis without current pathological fracture       aspirin 81 MG tablet      Take 1 tablet by mouth daily.    Mixed hyperlipidemia       atorvastatin 80 MG tablet    LIPITOR    90 tablet    Take 1 tablet (80 mg) by mouth daily    Hyperlipidemia LDL goal <160       buPROPion 300 MG 24 hr tablet    WELLBUTRIN XL    90 tablet    Take 1 tablet (300 mg) by mouth every morning    Major depression in complete remission (H)       estradiol 0.1 MG/GM cream    ESTRACE VAGINAL    0.5 g    Place 2 g vaginally three times a week    Atrophic vaginitis       FISH OIL PO      Take  by mouth.        Multi-vitamin Tabs tablet   Generic drug:  multivitamin, therapeutic with minerals     30    1 TABLET DAILY    Routine general medical examination at a health care facility       order for DME     2 Device    Equipment being ordered: lymphedema bandages    Personal history of malignant neoplasm of other site, Other lymphedema       order for DME     1 each    Equipment being ordered: Left Thigh High Compression  Stocking, 550 CCL.3    Myxoid liposarcoma (H)

## 2018-04-24 NOTE — TELEPHONE ENCOUNTER
Type of surgery: (SHIR2) - LEFT LEG ARTERIOGRAM WITH POSSIBLE INTERVENTION  Location of surgery: [facility]  Date and time of surgery: 4/30/18 @ 9:00 AM  Surgeon: DR JORJE CEDILLO  Pre-Op Appt Date: UNKNOWN  Post-Op Appt Date: UNKNOWN   Packet sent out: GIVEN TO PATIENT 4/24/18  Pre-cert/Authorization completed:  SENT FOR PA SUBMISSION  Date: [id number] Monica Falcon -  at [facility]

## 2018-04-24 NOTE — NURSING NOTE
"Chief Complaint   Patient presents with     Consult     New consult for PAD, ref by Sepideh Burris PA-C, records & ADRIAN in Epic       Initial /73 (BP Location: Left arm, Patient Position: Chair, Cuff Size: Adult Regular)  Pulse 80  Ht 5' 7\" (1.702 m)  Wt 146 lb (66.2 kg)  LMP 03/18/2005  BMI 22.87 kg/m2 Estimated body mass index is 22.87 kg/(m^2) as calculated from the following:    Height as of this encounter: 5' 7\" (1.702 m).    Weight as of this encounter: 146 lb (66.2 kg).  Medication Reconciliation: complete    Cathie Lo CMA on 4/24/2018 at 2:01 PM    "

## 2018-04-24 NOTE — NURSING NOTE
Patient Education    Procedure: left leg arteriogram with possible intervention  Diagnosis: left leg claudication  Anticoagulation Instruction: n/a  Pre-Operative Physical Exam: You need to have a pre-op physical exam within 30 days of your procedure. Your procedure may be cancelled if you do not have a current History and Physical. Call your PCP's office to schedule.  Allergies:  Updated in Epic  Bowel Prep: n/a  Post Procedure Education: [facility] post-procedure fact sheet reviewed with patient.    Learner(s):patient  Method: Listening, Reading  Barriers to Learning:No Barrier  Outcome: Patient did verbalize understanding of above education.    Nicol Buenrostro, CHERISEN, RN

## 2018-04-26 ENCOUNTER — TELEPHONE (OUTPATIENT)
Dept: OTHER | Facility: CLINIC | Age: 68
End: 2018-04-26

## 2018-04-26 ENCOUNTER — OFFICE VISIT (OUTPATIENT)
Dept: FAMILY MEDICINE | Facility: CLINIC | Age: 68
End: 2018-04-26
Payer: COMMERCIAL

## 2018-04-26 ENCOUNTER — TRANSFERRED RECORDS (OUTPATIENT)
Dept: HEALTH INFORMATION MANAGEMENT | Facility: CLINIC | Age: 68
End: 2018-04-26

## 2018-04-26 VITALS
WEIGHT: 151 LBS | RESPIRATION RATE: 16 BRPM | BODY MASS INDEX: 23.65 KG/M2 | TEMPERATURE: 97.9 F | SYSTOLIC BLOOD PRESSURE: 119 MMHG | HEART RATE: 92 BPM | DIASTOLIC BLOOD PRESSURE: 75 MMHG

## 2018-04-26 DIAGNOSIS — Z23 NEED FOR PROPHYLACTIC VACCINATION WITH TETANUS-DIPHTHERIA (TD): ICD-10-CM

## 2018-04-26 DIAGNOSIS — Z01.818 PREOP GENERAL PHYSICAL EXAM: Primary | ICD-10-CM

## 2018-04-26 PROCEDURE — 99214 OFFICE O/P EST MOD 30 MIN: CPT | Performed by: PHYSICIAN ASSISTANT

## 2018-04-26 PROCEDURE — 93000 ELECTROCARDIOGRAM COMPLETE: CPT | Performed by: PHYSICIAN ASSISTANT

## 2018-04-26 NOTE — TELEPHONE ENCOUNTER
"Pt called to discuss some questions she had regarding upcoming left leg arteriogram with possible intervention. Pt had received education regarding renal insufficiency and was unsure why.  8/24/17 comprehensive metabolic panel showed a GFR of 70.  Reiterated to pt due to a slightly lower GFR, increased fluid intake was encouraged to help protect her kidneys and \"flush\" out the dye.  Pt verbalized understanding of this.      Also reminded pt to bring an overnight bag with her to angiogram, in regards to possible intervention.    Nicol Buenrostro, CHERISEN, RN    "

## 2018-04-26 NOTE — TELEPHONE ENCOUNTER
Call made on 4/24/18 to request operative report from pt's 5/3/00 surgery with Dr. Nestor Canas.  Per [facility] at [facility], paper chart would need to be requested and operative report would be faxed the following day.    Second attempt made today 4/26/18 to obtain operative report.  Will continue to attempt to retrieve records.  Provided direct line and fax number on VM.    CHERISE JasonN, RN

## 2018-04-26 NOTE — MR AVS SNAPSHOT
After Visit Summary   4/26/2018    Angeli Jacobson    MRN: [id number]           Patient Information     Date Of Birth          1950        Visit Information        Provider Department      4/26/2018 2:00 PM Sepideh Burris PA-C [facility]        Today's Diagnoses     Preop general physical exam    -  1    Need for prophylactic vaccination with tetanus-diphtheria (TD)          Care Instructions      Before Your Surgery      Call your surgeon if there is any change in your health. This includes signs of a cold or flu (such as a sore throat, runny nose, cough, rash or fever).    Do not smoke, drink alcohol or take over the counter medicine (unless your surgeon or primary care doctor tells you to) for the 24 hours before and after surgery.    If you take prescribed drugs: Follow your doctor s orders about which medicines to take and which to stop until after surgery.    Eating and drinking prior to surgery: follow the instructions from your surgeon    Take a shower or bath the night before surgery. Use the soap your surgeon gave you to gently clean your skin. If you do not have soap from your surgeon, use your regular soap. Do not shave or scrub the surgery site.  Wear clean pajamas and have clean sheets on your bed.           Follow-ups after your visit        Your next 10 appointments already scheduled     Apr 26, 2018  2:00 PM CDT   Pre-Op physical with Sepideh Burris PA-C   [facility] ([facility])    [address] [place] [address] [phone] [phone]            Apr 27, 2018 11:00 AM CDT   US LOWER EXTREMITY VENOUS MAPPING BILATERAL with [id number]   [facility] ([facility])    [address] [address] [address] [phone]           Please bring a list of your medicines (including vitamins, minerals and over-the-counter drugs). Also, tell your  doctor about any allergies you may have. Wear comfortable clothes and leave your valuables at home.  You do not need to do anything special to prepare for your exam.  Please call the Imaging Department at your exam site with any questions.            Apr 30, 2018  9:00 AM CDT   (Arrive by 7:30 AM)   IR LOWER EXTREMITY ANGIOGRAM LEFT with SHIR2   [facility] Interventional [facility] ([facility])    [address] [place] [id number] [phone]           1. Your doctor will need to do a history and physical within 30 days before this procedure. 2. Your doctor will determine whether you need a 12 lead EKG, as well as which medications should not be taken the morning of the exam. 3. Laboratory tests are to be obtained by your doctor prior to the exam (creatinine, Hgb/Hct, platelet count, and INR) 4. If you have allergies to x-ray contrast or iodine, contact your doctor or a Radiology nurse prior to the exam day for instructions. 5. Someone will need to drive you to and from the hospital. 6. Bring a list of all drugs you are taking; include supplements and over-the-counter medications. 7. Wear comfortable clothes and leave your valuables at home. 8. If you are or may be pregnant, contact your doctor or a Radiology nurse prior to the day of the exam. 9.  If you have diabetes, check with your doctor or a Radiology nurse to see if your insulin needs to be adjusted for the exam. 10. If you are taking Coumadin (to thin you blood) please contact your doctor or a Radiology nurse at least 5 days before the exam for special instructions. 11. You should not have received barium (x-ray contrast) within 48 hours of this exam. 12. The day before your exam you may eat your regular diet and are encouraged to drink at least 2 quarts of clear liquids. Drink no alcoholic beverages for 24 hours prior to the exam. 13. If you have a colostomy you will need to irrigate it with tap water at 8PM the  evening before and again at 6AM the morning of the exam. 14. Do not smoke for 24 hours prior to the procedure. 15. Do not eat any solid food or milk products for 6 hours prior to the exam. You may drink clear liquids until 2 hours prior to the exam. Clear liquids include the following: water, Jell-O, clear broth, apple juice or any non-carbonated drink that you can see through (no pop!) 16. The morning of the exam you may brush your teeth and take medications as directed with a sip of water. 17. Tell the Radiology nurse if you have any allergies. 18. You will be asked to empty your bladder before the exam begins. 19. Following the exam you will need to remain on complete bedrest for 4-6 hours. The nurse will monitor your vital signs, puncture site, and the pulses and temperature of the arm or leg that was punctured. 20. When discharged, you cannot drive until morning, and an adult must be with you until then. You should stay in the [facility] area overnight.            Apr 30, 2018  9:00 AM CDT   [facility] IR Suite with Shade Owens MD   [facility] ([facility])    [facility] ([facility])   [phone]              Who to contact     If you have questions or need follow up information about today's clinic visit or your schedule please contact [facility] directly at [phone].  Normal or non-critical lab and imaging results will be communicated to you by MyChart, letter or phone within 4 business days after the clinic has received the results. If you do not hear from us within 7 days, please contact the clinic through MyChart or phone. If you have a critical or abnormal lab result, we will notify you by phone as soon as possible.  Submit refill requests through [facility] or call your pharmacy and they will forward the refill request to us. Please allow 3 business days for your refill to be  completed.          Additional Information About Your Visit        MyChart Information     [employer] gives you secure access to your electronic health record. If you see a primary care provider, you can also send messages to your care team and make appointments. If you have questions, please call your primary care clinic.  If you do not have a primary care provider, please call [phone] and they will assist you.        Care EveryWhere ID     This is your Care EveryWhere ID. This could be used by other organizations to access your [facility] medical records  [id number]        Your Vitals Were     Pulse Temperature Respirations Last Period BMI (Body Mass Index)       92 97.9  F (36.6  C) (Oral) 16 03/18/2005 23.65 kg/m2        Blood Pressure from Last 3 Encounters:   04/26/18 119/75   04/24/18 131/73   04/13/18 136/79    Weight from Last 3 Encounters:   04/26/18 151 lb (68.5 kg)   04/24/18 146 lb (66.2 kg)   04/13/18 152 lb (68.9 kg)              Today, you had the following     No orders found for display       Primary Care Provider Office Phone # Fax #    Sepideh Burris PA-C [phone] [address] [facility] [address]        Equal Access to Services     KERON OCASIO : Hadii aad ku hadasho Soomaali, waaxda luqadaha, qaybta kaalmada adeegyada, waxay idiin haybettyn maritza magallon lacharmaine . So [facility] [phone].    ATENCIÓN: Si habla español, tiene a carver disposición servicios gratuitos de asistencia lingüística. Llame al [phone].    We comply with applicable federal civil rights laws and [place] laws. We do not discriminate on the basis of race, color, national origin, age, disability, sex, sexual orientation, or gender identity.            Thank you!     Thank you for choosing [facility]  for your care. Our goal is always to provide you with excellent care. Hearing back from our patients is one way we can continue to improve our services. Please take a few minutes  to complete the written survey that you may receive in the mail after your visit with us. Thank you!             Your Updated Medication List - Protect others around you: Learn how to safely use, store and throw away your medicines at www.disposemymeds.org.          This list is accurate as of 4/26/18  1:58 PM.  Always use your most recent med list.                   Brand Name Dispense Instructions for use Diagnosis    alendronate 70 MG tablet    FOSAMAX    12 tablet    Take 1 tablet (70 mg) by mouth with 8oz water every 7 days 30 minutes before breakfast and remain upright during this time.    Age-related osteoporosis without current pathological fracture       aspirin 81 MG tablet      Take 1 tablet by mouth daily.    Mixed hyperlipidemia       atorvastatin 80 MG tablet    LIPITOR    90 tablet    Take 1 tablet (80 mg) by mouth daily    Hyperlipidemia LDL goal <160       buPROPion 300 MG 24 hr tablet    WELLBUTRIN XL    90 tablet    Take 1 tablet (300 mg) by mouth every morning    Major depression in complete remission (H)       estradiol 0.1 MG/GM cream    ESTRACE VAGINAL    0.5 g    Place 2 g vaginally three times a week    Atrophic vaginitis       FISH OIL PO      Take  by mouth.        Multi-vitamin Tabs tablet   Generic drug:  multivitamin, therapeutic with minerals     30    1 TABLET DAILY    Routine general medical examination at a health care facility       order for DME     2 Device    Equipment being ordered: lymphedema bandages    Personal history of malignant neoplasm of other site, Other lymphedema       order for DME     1 each    Equipment being ordered: Left Thigh High Compression Stocking, 550 CCL.3    Myxoid liposarcoma (H)

## 2018-04-26 NOTE — PROGRESS NOTES
[facility]  [address] [facility] [address]  [phone]  Dept: [phone]    PRE-OP EVALUATION:  Today's date: 2018    Angeli Jacobson (: 1950) presents for pre-operative evaluation assessment as requested by Dr. Owens.  She requires evaluation and anesthesia risk assessment prior to undergoing surgery/procedure for treatment of Arteriogram .    Fax number for surgical facility: [facility]  Primary Physician: Sepidhe Burris  Type of Anesthesia Anticipated: to be determined    Patient has a Health Care Directive or Living Will:  YES     Preop Questions 2018   Who is doing your surgery? Shade Owens MD   What are you having done? Arteriogram   Date of Surgery/Procedure: 2018   Facility or Hospital where procedure/surgery will be performed: [facility]   1.  Do you have a history of Heart attack, stroke, stent, coronary bypass surgery, or other heart surgery? No   2.  Do you ever have any pain or discomfort in your chest? No   3.  Do you have a history of  Heart Failure? No   4.   Are you troubled by shortness of breath when:  walking on a level surface, or up a slight hill, or at night? No   5.  Do you currently have a cold, bronchitis or other respiratory infection? No   6.  Do you have a cough, shortness of breath, or wheezing? No   7.  Do you sometimes get pains in the calves of your legs when you walk? YES - fatigue, not pain   8. Do you or anyone in your family have previous history of blood clots? No   9.  Do you or does anyone in your family have a serious bleeding problem such as prolonged bleeding following surgeries or cuts? No   10. Have you ever had problems with anemia or been told to take iron pills? No   11. Have you had any abnormal blood loss such as black, tarry or bloody stools, or abnormal vaginal bleeding? No   12. Have you ever had a blood transfusion? UNKNOWN - not to her knowledge   13. Have  you or any of your relatives ever had problems with anesthesia? YES - Patient did in  2000 during biopsy, got sick from gas anesthesia,  But surgeries since, did fine   14. Do you have sleep apnea, excessive snoring or daytime drowsiness? No   15. Do you have any prosthetic heart valves? No   16. Do you have prosthetic joints? No   17. Is there any chance that you may be pregnant? No         HPI:     HPI related to upcoming procedure: left leg claudication       See problem list for active medical problems.  Problems all longstanding and stable, except as noted/documented.  See ROS for pertinent symptoms related to these conditions.                                                                                                                                                          .    MEDICAL HISTORY:     Patient Active Problem List    Diagnosis Date Noted     PAD (peripheral artery disease) (H) 04/20/2018     Priority: Medium     Other constipation 06/27/2017     Priority: Medium     Vertigo 06/27/2017     Priority: Medium     Tubular adenoma 02/09/2016     Priority: Medium     Lymphedema of left leg 10/14/2014     Priority: Medium     Due to cancer       Major depressive disorder, recurrent episode, moderate (H) 06/23/2009     Priority: Medium     Cervicalgia 04/23/2009     Priority: Medium     Osteoporosis 06/19/2008     Priority: Medium     Problem list name updated by automated process. 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 She states she would want to be resuscitated in her current state of health. EDSON Monterroso RN   Advance Care Planning 8/25/2015: Receipt of ACP document:  Received: Health Care Directive which was witnessed or notarized on 4/15/2002.  Document not previously scanned.  Validation form completed and sent with document to be scanned.  Code Status needs to be updated to reflect choices in most recent ACP document. Left message for patient to return call regarding code status for current state of health. Orders placed.  Confirmed/documented designated decision maker(s).  Added by Milagros Monterroso  Patient states has Advance Directive and will bring in a copy to clinic. 7/26/2011          Impaired fasting glucose 06/16/2010     Priority: Low     Hearing loss 01/29/2007     Priority: Low     Has hearing aids       Pain in joint, multiple sites 10/27/2005     Priority: Low      Past Medical History:   Diagnosis Date     * * * SBE PROPHYLAXIS * * * 1998    Amox 500mg, take 4 tabs one hour prior to procedure.Takes this because of lymphedema secondary from leg surgey     Central serous retinopathy 2001    Resolved 9/2001     CHRONIC NECK PAIN 1995     Depressive disorder, not elsewhere classified 2001     MIXED HYPERLIPIDEMIA, LDL GOAL <160 1998    LDL goal < 160     MYXOID LIPOSARCOMA 2000    Left thigh, S/P excision, radiation  at [facility]     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    CHRONIC LEFT THIGH LYMPHEDEMA     Nontoxic multinodular goiter 2005    needs yearly US     Osteoporosis, unspecified 2001     Other lymphedema 2000    left thigh, gets regular PT for this     PAD (peripheral artery disease) (H) 4/20/2018     SHINGLES 2001     Sprain of lumbosacral (joint) (ligament) 1995    right     Unspecified  hearing loss 1998    chronic tinnitus     Unspecified tinnitus 1998     Past Surgical History:   Procedure Laterality Date     C APPENDECTOMY      Appendectomy     C NONSPECIFIC PROCEDURE  04/2000    Open Biopsy Left Thigh Liposarcoma     COLONOSCOPY N/A 2/5/2016    Procedure: COMBINED COLONOSCOPY, SINGLE OR MULTIPLE BIOPSY/POLYPECTOMY BY BIOPSY;  Surgeon: Varun Stanley MD, MD;  Location: [facility]     EXCISION MALIG LESION>1.25CM  5/2000    Myxoid Liposarcoma       HC COLONOSCOPY THRU STOMA, DIAGNOSTIC  2006    due 2010     HC COLP CERVIX/UPPER VAGINA  07/1997    Negative     HC DILATION/CURETTAGE DIAG/THER NON OB  02/1997    Post menopausal bleeding on HRT, negative     Current Outpatient Prescriptions   Medication Sig Dispense Refill     alendronate (FOSAMAX) 70 MG tablet Take 1 tablet (70 mg) by mouth with 8oz water every 7 days 30 minutes before breakfast and remain upright during this time. 12 tablet 3     aspirin 81 MG tablet Take 1 tablet by mouth daily.  3     atorvastatin (LIPITOR) 80 MG tablet Take 1 tablet (80 mg) by mouth daily 90 tablet 3     buPROPion (WELLBUTRIN XL) 300 MG 24 hr tablet Take 1 tablet (300 mg) by mouth every morning 90 tablet 3     estradiol (ESTRACE VAGINAL) 0.1 MG/GM cream Place 2 g vaginally three times a week 0.5 g 3     MULTI-VITAMIN OR TABS 1 TABLET DAILY 30 prn     Omega-3 Fatty Acids (FISH OIL PO) Take  by mouth.       ORDER FOR DME Equipment being ordered: lymphedema bandages 2 Device 1     order for DME Equipment being ordered: Left Thigh High Compression Stocking, 550 CCL.3 1 each 99     OTC products: Aspirin    Allergies   Allergen Reactions     Celexa [Citalopram Hydrobromide]      Decreased libido      Latex Allergy: NO    Social History   Substance Use Topics     Smoking status: Never Smoker     Smokeless tobacco: Never Used     Alcohol use No     History   Drug Use No       REVIEW OF SYSTEMS:   Constitutional, neuro, ENT, endocrine, pulmonary, cardiac, gastrointestinal,  genitourinary, musculoskeletal, integument and psychiatric systems are negative, except as otherwise noted.    EXAM:   LMP 03/18/2005   /75 (BP Location: Right arm, Patient Position: Chair, Cuff Size: Adult Regular)  Pulse 92  Temp 97.9  F (36.6  C) (Oral)  Resp 16  Wt 151 lb (68.5 kg)  LMP 03/18/2005  BMI 23.65 kg/m2      GENERAL APPEARANCE: healthy, alert and no distress     EYES: EOMI, PERRL     HENT: ear canals and TM's normal and nose and mouth without ulcers or lesions     NECK: no adenopathy, no asymmetry, masses, or scars and thyroid normal to palpation     RESP: lungs clear to auscultation - no rales, rhonchi or wheezes     CV: regular rates and rhythm, normal S1 S2, no S3 or S4 and no murmur, click or rub     ABDOMEN:  soft, nontender, no HSM or masses and bowel sounds normal     MS: extremities normal- no gross deformities noted, no evidence of inflammation in joints, FROM in all extremities.     SKIN: no suspicious lesions or rashes     NEURO: Normal strength and tone, sensory exam grossly normal, mentation intact and speech normal     PSYCH: mentation appears normal. and affect normal/bright     LYMPHATICS: No cervical adenopathy    DIAGNOSTICS:   EKG: appears normal, NSR, normal axis, normal intervals, no acute ST/T changes c/w ischemia, no LVH by voltage criteria,     Hospital Labs pended from Dr. Owens.    Recent Labs   Lab Test  08/24/17   0929  09/29/16   0846  05/29/15   1125   HGB  14.4   --   14.2   PLT  262   --   257   NA  141  141  140   POTASSIUM  4.1  4.2  4.5   CR  0.81  0.74  0.74        IMPRESSION:   Reason for surgery/procedure: LEFT LEG ARTERIOGRAM WITH POSSIBLE INTERVENTION  Diagnosis/reason for consult: preop    The proposed surgical procedure is considered INTERMEDIATE risk.    REVISED CARDIAC RISK INDEX  The patient has the following serious cardiovascular risks for perioperative complications such as (MI, PE, VFib and 3  AV Block):    INTERPRETATION: 1 risks: Class II  (low risk - 0.9% complication rate)    The patient has the following additional risks for perioperative complications:  No identified additional risks      ICD-10-CM    1. Preop general physical exam Z01.818        RECOMMENDATIONS:         --Patient is to hold all scheduled medications on the day of surgery    APPROVAL GIVEN to proceed with proposed procedure, without further diagnostic evaluation       Signed Electronically by: Sepideh Burris PA-C    Copy of this evaluation report is provided to requesting physician.    [facility] Preop Guidelines    Revised Cardiac Risk Index

## 2018-04-27 ENCOUNTER — HOSPITAL ENCOUNTER (OUTPATIENT)
Dept: ULTRASOUND IMAGING | Facility: CLINIC | Age: 68
Discharge: HOME | End: 2018-04-27
Attending: SURGERY | Admitting: SURGERY
Payer: MEDICARE

## 2018-04-27 DIAGNOSIS — I70.219 ATHEROSCLEROSIS OF NATIVE ARTERIES OF EXTREMITY WITH INTERMITTENT CLAUDICATION (H): ICD-10-CM

## 2018-04-27 PROCEDURE — 93970 EXTREMITY STUDY: CPT

## 2018-04-30 ENCOUNTER — OFFICE VISIT (OUTPATIENT)
Dept: SURGERY | Facility: PHYSICIAN GROUP | Age: 68
End: 2018-04-30
Payer: COMMERCIAL

## 2018-04-30 ENCOUNTER — HOSPITAL ENCOUNTER (INPATIENT)
Facility: CLINIC | Age: 68
LOS: 3 days | Discharge: HOME | DRG: 271 | End: 2018-05-03
Attending: SURGERY | Admitting: SURGERY
Payer: MEDICARE

## 2018-04-30 ENCOUNTER — APPOINTMENT (OUTPATIENT)
Dept: INTERVENTIONAL RADIOLOGY/VASCULAR | Facility: CLINIC | Age: 68
DRG: 271 | End: 2018-04-30
Attending: SURGERY
Payer: MEDICARE

## 2018-04-30 DIAGNOSIS — T82.898A VASCULAR GRAFT OCCLUSION, INITIAL ENCOUNTER (H): ICD-10-CM

## 2018-04-30 DIAGNOSIS — E78.5 HYPERLIPIDEMIA LDL GOAL <70: Primary | ICD-10-CM

## 2018-04-30 DIAGNOSIS — I70.212 ATHEROSCLEROSIS OF NATIVE ARTERY OF LEFT LOWER EXTREMITY WITH INTERMITTENT CLAUDICATION (H): ICD-10-CM

## 2018-04-30 DIAGNOSIS — G89.18 ACUTE POST-OPERATIVE PAIN: ICD-10-CM

## 2018-04-30 LAB
APTT PPP: 26 SEC (ref 22–37)
CHOLEST SERPL-MCNC: 255 MG/DL
CREAT SERPL-MCNC: 0.71 MG/DL (ref 0.52–1.04)
ERYTHROCYTE [DISTWIDTH] IN BLOOD BY AUTOMATED COUNT: 13.3 % (ref 10–15)
GFR SERPL CREATININE-BSD FRML MDRD: 82 ML/MIN/1.7M2
HBA1C MFR BLD: 5.8 % (ref 0–6.4)
HCT VFR BLD AUTO: 40.6 % (ref 35–47)
HDLC SERPL-MCNC: 68 MG/DL
HGB BLD-MCNC: 13.7 G/DL (ref 11.7–15.7)
INR PPP: 0.96 (ref 0.86–1.14)
LDLC SERPL CALC-MCNC: 173 MG/DL
MCH RBC QN AUTO: 30.7 PG (ref 26.5–33)
MCHC RBC AUTO-ENTMCNC: 33.7 G/DL (ref 31.5–36.5)
MCV RBC AUTO: 91 FL (ref 78–100)
NONHDLC SERPL-MCNC: 187 MG/DL
PLATELET # BLD AUTO: 236 10E9/L (ref 150–450)
RBC # BLD AUTO: 4.46 10E12/L (ref 3.8–5.2)
TRIGL SERPL-MCNC: 68 MG/DL
WBC # BLD AUTO: 5.7 10E9/L (ref 4–11)

## 2018-04-30 PROCEDURE — 75710 ARTERY X-RAYS ARM/LEG: CPT | Mod: 26 | Performed by: SURGERY

## 2018-04-30 PROCEDURE — 25000128 H RX IP 250 OP 636: Performed by: SURGERY

## 2018-04-30 PROCEDURE — 40000853 ZZH STATISTIC ANGIOGRAM, STENT, VERTEBRO PLASTY

## 2018-04-30 PROCEDURE — 40000885 ZZH STATISTIC STEP DOWN HRS EVENING

## 2018-04-30 PROCEDURE — 40000884 ZZH STATISTIC STEP DOWN HRS NIGHT

## 2018-04-30 PROCEDURE — 37211 THROMBOLYTIC ART THERAPY: CPT | Performed by: SURGERY

## 2018-04-30 PROCEDURE — 27210808 ZZH SHEATH CR7

## 2018-04-30 PROCEDURE — 36247 INS CATH ABD/L-EXT ART 3RD: CPT | Performed by: SURGERY

## 2018-04-30 PROCEDURE — 85027 COMPLETE CBC AUTOMATED: CPT | Performed by: SURGERY

## 2018-04-30 PROCEDURE — 99221 1ST HOSP IP/OBS SF/LOW 40: CPT | Performed by: INTERNAL MEDICINE

## 2018-04-30 PROCEDURE — 85610 PROTHROMBIN TIME: CPT | Performed by: SURGERY

## 2018-04-30 PROCEDURE — 3E05317 INTRODUCTION OF OTHER THROMBOLYTIC INTO PERIPHERAL ARTERY, PERCUTANEOUS APPROACH: ICD-10-PCS | Performed by: SURGERY

## 2018-04-30 PROCEDURE — 40000886 ZZH STATISTIC STEP DOWN HRS DAY

## 2018-04-30 PROCEDURE — 85730 THROMBOPLASTIN TIME PARTIAL: CPT | Performed by: SURGERY

## 2018-04-30 PROCEDURE — 36415 COLL VENOUS BLD VENIPUNCTURE: CPT | Performed by: SURGERY

## 2018-04-30 PROCEDURE — 36247 INS CATH ABD/L-EXT ART 3RD: CPT

## 2018-04-30 PROCEDURE — 25000125 ZZHC RX 250

## 2018-04-30 PROCEDURE — 25000128 H RX IP 250 OP 636

## 2018-04-30 PROCEDURE — 83036 HEMOGLOBIN GLYCOSYLATED A1C: CPT | Performed by: SURGERY

## 2018-04-30 PROCEDURE — 12000007 ZZH R&B INTERMEDIATE

## 2018-04-30 PROCEDURE — 27210886 ZZH ACCESSORY CR5

## 2018-04-30 PROCEDURE — 80061 LIPID PANEL: CPT | Performed by: SURGERY

## 2018-04-30 PROCEDURE — C1769 GUIDE WIRE: HCPCS

## 2018-04-30 PROCEDURE — 27210742 ZZH CATH CR1

## 2018-04-30 PROCEDURE — 37211 THROMBOLYTIC ART THERAPY: CPT

## 2018-04-30 PROCEDURE — 27210743 ZZH CATH CR11

## 2018-04-30 PROCEDURE — 27210894 ZZH CATH CR6

## 2018-04-30 PROCEDURE — 27210896 ZZH CATH CR9

## 2018-04-30 PROCEDURE — 75774 ARTERY X-RAY EACH VESSEL: CPT | Mod: 26 | Performed by: SURGERY

## 2018-04-30 PROCEDURE — 27210906 ZZH KIT CR8

## 2018-04-30 PROCEDURE — 82565 ASSAY OF CREATININE: CPT | Performed by: SURGERY

## 2018-04-30 PROCEDURE — 99152 MOD SED SAME PHYS/QHP 5/>YRS: CPT | Performed by: SURGERY

## 2018-04-30 RX ORDER — METOCLOPRAMIDE 5 MG/1
5 TABLET ORAL EVERY 6 HOURS PRN
Status: DISCONTINUED | OUTPATIENT
Start: 2018-04-30 | End: 2018-05-03 | Stop reason: HOSPADM

## 2018-04-30 RX ORDER — LIDOCAINE HYDROCHLORIDE 10 MG/ML
INJECTION, SOLUTION INFILTRATION; PERINEURAL
Status: DISCONTINUED
Start: 2018-04-30 | End: 2018-04-30 | Stop reason: HOSPADM

## 2018-04-30 RX ORDER — HEPARIN SODIUM 1000 [USP'U]/ML
500-6000 INJECTION, SOLUTION INTRAVENOUS; SUBCUTANEOUS
Status: DISCONTINUED | OUTPATIENT
Start: 2018-04-30 | End: 2018-04-30 | Stop reason: HOSPADM

## 2018-04-30 RX ORDER — PROCHLORPERAZINE 25 MG
12.5 SUPPOSITORY, RECTAL RECTAL EVERY 12 HOURS PRN
Status: DISCONTINUED | OUTPATIENT
Start: 2018-04-30 | End: 2018-05-01

## 2018-04-30 RX ORDER — ONDANSETRON 2 MG/ML
4 INJECTION INTRAMUSCULAR; INTRAVENOUS EVERY 6 HOURS PRN
Status: DISCONTINUED | OUTPATIENT
Start: 2018-04-30 | End: 2018-05-03 | Stop reason: HOSPADM

## 2018-04-30 RX ORDER — LIDOCAINE 40 MG/G
CREAM TOPICAL
Status: DISCONTINUED | OUTPATIENT
Start: 2018-04-30 | End: 2018-04-30 | Stop reason: HOSPADM

## 2018-04-30 RX ORDER — FENTANYL CITRATE 50 UG/ML
INJECTION, SOLUTION INTRAMUSCULAR; INTRAVENOUS
Status: DISCONTINUED
Start: 2018-04-30 | End: 2018-04-30 | Stop reason: HOSPADM

## 2018-04-30 RX ORDER — BUPROPION HYDROCHLORIDE 300 MG/1
300 TABLET ORAL EVERY MORNING
Status: DISCONTINUED | OUTPATIENT
Start: 2018-05-01 | End: 2018-05-03 | Stop reason: HOSPADM

## 2018-04-30 RX ORDER — LIDOCAINE HYDROCHLORIDE 10 MG/ML
1-30 INJECTION, SOLUTION EPIDURAL; INFILTRATION; INTRACAUDAL; PERINEURAL
Status: COMPLETED | OUTPATIENT
Start: 2018-04-30 | End: 2018-04-30

## 2018-04-30 RX ORDER — FLUMAZENIL 0.1 MG/ML
0.2 INJECTION, SOLUTION INTRAVENOUS
Status: DISCONTINUED | OUTPATIENT
Start: 2018-04-30 | End: 2018-04-30 | Stop reason: HOSPADM

## 2018-04-30 RX ORDER — ONDANSETRON 4 MG/1
4 TABLET, ORALLY DISINTEGRATING ORAL EVERY 6 HOURS PRN
Status: DISCONTINUED | OUTPATIENT
Start: 2018-04-30 | End: 2018-05-03 | Stop reason: HOSPADM

## 2018-04-30 RX ORDER — DOCUSATE SODIUM 100 MG/1
100 CAPSULE, LIQUID FILLED ORAL 2 TIMES DAILY
Status: DISCONTINUED | OUTPATIENT
Start: 2018-04-30 | End: 2018-05-03 | Stop reason: HOSPADM

## 2018-04-30 RX ORDER — METOCLOPRAMIDE HYDROCHLORIDE 5 MG/ML
5 INJECTION INTRAMUSCULAR; INTRAVENOUS EVERY 6 HOURS PRN
Status: DISCONTINUED | OUTPATIENT
Start: 2018-04-30 | End: 2018-05-03 | Stop reason: HOSPADM

## 2018-04-30 RX ORDER — SODIUM CHLORIDE 9 MG/ML
INJECTION, SOLUTION INTRAVENOUS CONTINUOUS
Status: DISCONTINUED | OUTPATIENT
Start: 2018-04-30 | End: 2018-04-30 | Stop reason: HOSPADM

## 2018-04-30 RX ORDER — SODIUM CHLORIDE 9 MG/ML
INJECTION, SOLUTION INTRAVENOUS CONTINUOUS
Status: DISCONTINUED | OUTPATIENT
Start: 2018-04-30 | End: 2018-05-01

## 2018-04-30 RX ORDER — ASPIRIN 81 MG/1
81 TABLET ORAL DAILY
Status: DISCONTINUED | OUTPATIENT
Start: 2018-05-01 | End: 2018-05-03 | Stop reason: HOSPADM

## 2018-04-30 RX ORDER — HEPARIN SODIUM 1000 [USP'U]/ML
INJECTION, SOLUTION INTRAVENOUS; SUBCUTANEOUS
Status: DISCONTINUED
Start: 2018-04-30 | End: 2018-04-30 | Stop reason: HOSPADM

## 2018-04-30 RX ORDER — ATORVASTATIN CALCIUM 20 MG/1
80 TABLET, FILM COATED ORAL EVERY EVENING
Status: DISCONTINUED | OUTPATIENT
Start: 2018-04-30 | End: 2018-05-01 | Stop reason: ALTCHOICE

## 2018-04-30 RX ORDER — PROCHLORPERAZINE MALEATE 5 MG
5 TABLET ORAL EVERY 6 HOURS PRN
Status: DISCONTINUED | OUTPATIENT
Start: 2018-04-30 | End: 2018-05-03 | Stop reason: HOSPADM

## 2018-04-30 RX ORDER — NALOXONE HYDROCHLORIDE 0.4 MG/ML
.1-.4 INJECTION, SOLUTION INTRAMUSCULAR; INTRAVENOUS; SUBCUTANEOUS
Status: DISCONTINUED | OUTPATIENT
Start: 2018-04-30 | End: 2018-05-03 | Stop reason: HOSPADM

## 2018-04-30 RX ORDER — IODIXANOL 320 MG/ML
150 INJECTION, SOLUTION INTRAVASCULAR ONCE
Status: COMPLETED | OUTPATIENT
Start: 2018-04-30 | End: 2018-04-30

## 2018-04-30 RX ORDER — FENTANYL CITRATE 50 UG/ML
25-50 INJECTION, SOLUTION INTRAMUSCULAR; INTRAVENOUS EVERY 5 MIN PRN
Status: DISCONTINUED | OUTPATIENT
Start: 2018-04-30 | End: 2018-04-30 | Stop reason: HOSPADM

## 2018-04-30 RX ORDER — HEPARIN SODIUM 10000 [USP'U]/100ML
500 INJECTION, SOLUTION INTRAVENOUS CONTINUOUS
Status: DISCONTINUED | OUTPATIENT
Start: 2018-04-30 | End: 2018-05-01

## 2018-04-30 RX ORDER — NALOXONE HYDROCHLORIDE 0.4 MG/ML
.1-.4 INJECTION, SOLUTION INTRAMUSCULAR; INTRAVENOUS; SUBCUTANEOUS
Status: DISCONTINUED | OUTPATIENT
Start: 2018-04-30 | End: 2018-04-30 | Stop reason: HOSPADM

## 2018-04-30 RX ADMIN — HEPARIN SODIUM 10000 UNITS: 10000 INJECTION, SOLUTION INTRAVENOUS; SUBCUTANEOUS at 09:20

## 2018-04-30 RX ADMIN — MIDAZOLAM HYDROCHLORIDE 0.5 MG: 1 INJECTION, SOLUTION INTRAMUSCULAR; INTRAVENOUS at 09:27

## 2018-04-30 RX ADMIN — FENTANYL CITRATE 50 MCG: 50 INJECTION, SOLUTION INTRAMUSCULAR; INTRAVENOUS at 09:19

## 2018-04-30 RX ADMIN — MIDAZOLAM HYDROCHLORIDE 1 MG: 1 INJECTION, SOLUTION INTRAMUSCULAR; INTRAVENOUS at 09:18

## 2018-04-30 RX ADMIN — PROCHLORPERAZINE EDISYLATE 5 MG: 5 INJECTION INTRAMUSCULAR; INTRAVENOUS at 13:19

## 2018-04-30 RX ADMIN — HYDROMORPHONE HYDROCHLORIDE: 10 INJECTION, SOLUTION INTRAMUSCULAR; INTRAVENOUS; SUBCUTANEOUS at 11:58

## 2018-04-30 RX ADMIN — HEPARIN SODIUM 500 UNITS/HR: 10000 INJECTION, SOLUTION INTRAVENOUS at 10:34

## 2018-04-30 RX ADMIN — FENTANYL CITRATE 25 MCG: 50 INJECTION, SOLUTION INTRAMUSCULAR; INTRAVENOUS at 10:01

## 2018-04-30 RX ADMIN — ALTEPLASE 0.5 MG/HR: KIT at 20:50

## 2018-04-30 RX ADMIN — ONDANSETRON 4 MG: 2 INJECTION INTRAMUSCULAR; INTRAVENOUS at 12:10

## 2018-04-30 RX ADMIN — LIDOCAINE HYDROCHLORIDE 10 ML: 10 INJECTION, SOLUTION INFILTRATION; PERINEURAL at 09:28

## 2018-04-30 RX ADMIN — ALTEPLASE 0.5 MG/HR: KIT at 10:34

## 2018-04-30 RX ADMIN — SODIUM CHLORIDE: 9 INJECTION, SOLUTION INTRAVENOUS at 20:13

## 2018-04-30 RX ADMIN — SODIUM CHLORIDE: 9 INJECTION, SOLUTION INTRAVENOUS at 11:12

## 2018-04-30 RX ADMIN — SODIUM CHLORIDE: 9 INJECTION, SOLUTION INTRAVENOUS at 08:22

## 2018-04-30 RX ADMIN — IODIXANOL 150 ML: 320 INJECTION, SOLUTION INTRAVASCULAR at 10:14

## 2018-04-30 RX ADMIN — LIDOCAINE HYDROCHLORIDE 10 ML: 10 INJECTION, SOLUTION EPIDURAL; INFILTRATION; INTRACAUDAL; PERINEURAL at 09:28

## 2018-04-30 RX ADMIN — FENTANYL CITRATE 25 MCG: 50 INJECTION, SOLUTION INTRAMUSCULAR; INTRAVENOUS at 09:40

## 2018-04-30 RX ADMIN — MIDAZOLAM HYDROCHLORIDE 0.5 MG: 1 INJECTION, SOLUTION INTRAMUSCULAR; INTRAVENOUS at 09:40

## 2018-04-30 ASSESSMENT — VISUAL ACUITY
OU: NORMAL ACUITY;GLASSES

## 2018-04-30 ASSESSMENT — PAIN DESCRIPTION - DESCRIPTORS: DESCRIPTORS: ACHING

## 2018-04-30 NOTE — IP AVS SNAPSHOT
MRN:[id number]                      After Visit Summary   4/30/2018    Angeli Jacobson    MRN: [id number]           Thank you!     Thank you for choosing [facility] for your care. Our goal is always to provide you with excellent care. Hearing back from our patients is one way we can continue to improve our services. Please take a few minutes to complete the written survey that you may receive in the mail after you visit with us. Thank you!        Patient Information     Date Of Birth          1950        Designated Caregiver       Most Recent Value    Caregiver    Will someone help with your care after discharge? yes    Name of designated caregiver Chris    Phone number of caregiver     Caregiver address [id number] [place] [address]. 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 This was complicated by hemorrhage at the access site in the right femoral artery necessitating primary closure in the OR.                  Who to Call     For medical emergencies, please call 911.  For non-urgent questions about your medical care, please call your primary care provider or clinic, [phone]  For questions related to your surgery, please call your surgery clinic        Attending Provider     Provider Specialty    Shade Owens MD Vascular Surgery       Primary Care Provider Office Phone # Fax #    Sepideh Burris PA-C [phone]      After Care Instructions     Activity       Your activity upon discharge: activity as tolerated            Diet       Follow this diet upon discharge: Regular Diet Adult             "Discharge Instructions       1. Stop you Atorvastatin (Lipitor) and instead start Rosuvastatin (Crestor) for your cholesterol. Also add in Zetia 5 mg daily. You should then have your cholesterol rechecked in 3 months.     2. You will be taking Xarelto for anticoagulation. Start with 15 mg twice daily (with breakfast and supper) for a total of 21 days. Then you should switch over to 20 mg tablets daily (with supper) only. Always take it with food.    3. Don't take your fish oil while on aspirin and Xarelto as this can increase your chances of bleeding.     4. Stop your Estrace cream.                  Follow-up Appointments     Follow-up and recommended labs and tests        Follow up with Dr. Owens in 2 weeks. Please call [phone] to schedule or if you have any additional questions for Dr. Owens or Vascular Medicine.    Have your cholesterol rechecked in 3 months through your primary care provider.                  Pending Results     Date and Time Order Name Status Description    4/30/2018 1059 IR Angiogram through Catheter Follow Up In process             Statement of Approval     Ordered          05/03/18 1141  I have reviewed and agree with all the recommendations and orders detailed in this document.  EFFECTIVE NOW     Approved and electronically signed by:  Shelly Trinidad PA-C             Admission Information     Date & Time Provider Department Dept. Phone    4/30/2018 Shade Owens MD [facility] [address] [facility] [phone]      Your Vitals Were     Blood Pressure Pulse Temperature Respirations Height Weight    121/62 (BP Location: Right arm) 88 97.7  F (36.5  C) (Oral) 16 1.676 m (5' 6\") 68.5 kg (151 lb)    Last Period Pulse Oximetry BMI (Body Mass Index)             03/18/2005 96% 24.37 kg/m2         MyChart Information     [facility] gives you secure access to your electronic health record. If you see a primary care provider, you can also send messages to your care team and " make appointments. If you have questions, please call your primary care clinic.  If you do not have a primary care provider, please call [phone] and they will assist you.        Care EveryWhere ID     This is your Care EveryWhere ID. This could be used by other organizations to access your [facility] medical records  [id number]        Equal Access to Services     KERON OCASIO : Hadii corazon gil hadbonnieo Soanaali, waaxda luqadaha, qaybta kaalmada bashir, radames noyolamarthacolt hatch . So [facility] [phone].    ATENCIÓN: Si habla español, tiene a carver disposición servicios gratuitos de asistencia lingüística. Llame al [phone].    We comply with applicable federal civil rights laws and [place] laws. We do not discriminate on the basis of race, color, national origin, age, disability, sex, sexual orientation, or gender identity.               Review of your medicines      START taking        Dose / Directions    ezetimibe 10 MG tablet   Commonly known as:  ZETIA   Used for:  Hyperlipidemia LDL goal <70        Dose:  5 mg   Take 0.5 tablets (5 mg) by mouth daily   Quantity:  90 tablet   Refills:  1       oxyCODONE IR 5 MG tablet   Commonly known as:  ROXICODONE   Used for:  Acute post-operative pain        Dose:  5-10 mg   Take 1-2 tablets (5-10 mg) by mouth every 4 hours as needed for moderate to severe pain   Quantity:  14 tablet   Refills:  0       * rivaroxaban ANTICOAGULANT 15 MG Tabs tablet   Commonly known as:  XARELTO        Dose:  15 mg   Take 1 tablet (15 mg) by mouth 2 times daily (with meals)   Quantity:  41 tablet   Refills:  0       * rivaroxaban ANTICOAGULANT 20 MG Tabs tablet   Commonly known as:  XARELTO        Dose:  20 mg   Take 1 tablet (20 mg) by mouth daily (with dinner) Start after completing 15 mg tablets   Quantity:  90 tablet   Refills:  1       rosuvastatin 40 MG tablet   Commonly known as:  CRESTOR   Used for:  Hyperlipidemia LDL goal <70        Dose:  40 mg   Take 1 tablet  (40 mg) by mouth At Bedtime   Quantity:  90 tablet   Refills:  3       * Notice:  This list has 2 medication(s) that are the same as other medications prescribed for you. Read the directions carefully, and ask your doctor or other care provider to review them with you.      CONTINUE these medicines which have NOT CHANGED        Dose / Directions    alendronate 70 MG tablet   Commonly known as:  FOSAMAX   Used for:  Age-related osteoporosis without current pathological fracture        Take 1 tablet (70 mg) by mouth with 8oz water every 7 days 30 minutes before breakfast and remain upright during this time.   Quantity:  12 tablet   Refills:  3       aspirin 81 MG tablet   Used for:  Mixed hyperlipidemia        Dose:  1 tablet   Take 1 tablet by mouth daily.   Refills:  3       buPROPion 300 MG 24 hr tablet   Commonly known as:  WELLBUTRIN XL   Used for:  Major depression in complete remission (H)        Dose:  300 mg   Take 1 tablet (300 mg) by mouth every morning   Quantity:  90 tablet   Refills:  3       Multi-vitamin Tabs tablet   Used for:  Routine general medical examination at a health care facility   Generic drug:  multivitamin, therapeutic with minerals        1 TABLET DAILY   Quantity:  30   Refills:  prn       order for DME   Used for:  Personal history of malignant neoplasm of other site, Other lymphedema        Equipment being ordered: lymphedema bandages   Quantity:  2 Device   Refills:  1       order for DME   Used for:  Myxoid liposarcoma (H)        Equipment being ordered: Left Thigh High Compression Stocking, 550 CCL.3   Quantity:  1 each   Refills:  99         STOP taking     atorvastatin 80 MG tablet   Commonly known as:  LIPITOR           estradiol 0.1 MG/GM cream   Commonly known as:  ESTRACE VAGINAL           FISH OIL PO                Where to get your medicines      These medications were sent to [facility] - [address]  [address] [place] [address], [place]  [address]     Phone:  [phone]     ezetimibe 10 MG tablet    rivaroxaban ANTICOAGULANT 15 MG Tabs tablet    rivaroxaban ANTICOAGULANT 20 MG Tabs tablet    rosuvastatin 40 MG tablet         Some of these will need a paper prescription and others can be bought over the counter. Ask your nurse if you have questions.     Bring a paper prescription for each of these medications     oxyCODONE IR 5 MG tablet                Protect others around you: Learn how to safely use, store and throw away your medicines at www.disposemymeds.org.        Information about OPIOIDS     PRESCRIPTION OPIOIDS: WHAT YOU NEED TO KNOW   You have a prescription for an opioid (narcotic) pain medicine. Opioids can cause addiction. If you have a history of chemical dependency of any type, you are at a higher risk of becoming addicted to opioids. Only take this medicine after all other options have been tried. Take it for as short a time and as few doses as possible.     Do not:    Drive. If you drive while taking these medicines, you could be arrested for driving under the influence (DUI).    Operate heavy machinery    Do any other dangerous activities while taking these medicines.     Drink any alcohol while taking these medicines.      Take with any other medicines that contain acetaminophen. Read all labels carefully. Look for the word  acetaminophen  or  Tylenol.  Ask your pharmacist if you have questions or are unsure.    Store your pills in a secure place, locked if possible. We will not replace any lost or stolen medicine. If you don t finish your medicine, please throw away (dispose) as directed by your pharmacist. The [place] Pollution Control Agency has more information about safe disposal: https://www.pca.[facility].[place].us/living-green/managing-unwanted-medications    All opioids tend to cause constipation. Drink plenty of water and eat foods that have a lot of fiber, such as fruits, vegetables, prune juice, apple juice and high-fiber  cereal. Take a laxative (Miralax, milk of magnesia, Colace, Senna) if you don t move your bowels at least every other day.              Medication List: This is a list of all your medications and when to take them. Check marks below indicate your daily home schedule. Keep this list as a reference.      Medications           Morning Afternoon Evening Bedtime As Needed    alendronate 70 MG tablet   Commonly known as:  FOSAMAX   Take 1 tablet (70 mg) by mouth with 8oz water every 7 days 30 minutes before breakfast and remain upright during this time.                                aspirin 81 MG tablet   Take 1 tablet by mouth daily.                                   buPROPion 300 MG 24 hr tablet   Commonly known as:  WELLBUTRIN XL   Take 1 tablet (300 mg) by mouth every morning   Last time this was given:  300 mg on 5/3/2018  8:44 AM                                   ezetimibe 10 MG tablet   Commonly known as:  ZETIA   Take 0.5 tablets (5 mg) by mouth daily                                   Multi-vitamin Tabs tablet   1 TABLET DAILY   Generic drug:  multivitamin, therapeutic with minerals                                   order for DME   Equipment being ordered: lymphedema bandages                                order for DME   Equipment being ordered: Left Thigh High Compression Stocking, 550 CCL.3                                oxyCODONE IR 5 MG tablet   Commonly known as:  ROXICODONE   Take 1-2 tablets (5-10 mg) by mouth every 4 hours as needed for moderate to severe pain   Last time this was given:  5 mg on 5/3/2018  1:09 PM                                   * rivaroxaban ANTICOAGULANT 15 MG Tabs tablet   Commonly known as:  XARELTO   Take 1 tablet (15 mg) by mouth 2 times daily (with meals)   Last time this was given:  15 mg on 5/3/2018  8:44 AM                                      * rivaroxaban ANTICOAGULANT 20 MG Tabs tablet   Commonly known as:  XARELTO   Take 1 tablet (20 mg) by mouth daily (with dinner) Start  after completing 15 mg tablets   Last time this was given:  15 mg on 5/3/2018  8:44 AM                                   rosuvastatin 40 MG tablet   Commonly known as:  CRESTOR   Take 1 tablet (40 mg) by mouth At Bedtime   Last time this was given:  40 mg on 5/2/2018  9:19 PM                                   * Notice:  This list has 2 medication(s) that are the same as other medications prescribed for you. Read the directions carefully, and ask your doctor or other care provider to review them with you.              More Information        Venous Angioplasty and Stenting    Venous angioplasty is a procedure done to treat vein blockages. Large veins that are narrowed or blocked can cause severe swelling and pain. Sometimes a metal mesh tube called a stent may also be placed into the vein to hold it open. This procedure is done by a specially trained doctor called an interventional radiologist. This doctor is trained and certified by the American Board of Radiology to use minimally invasive image-guided procedures to diagnose and treat diseases.   Before your procedure  Follow any instructions you are given on how to get ready. This includes any directions you re given for not eating or drinking before the procedure.  Tell your healthcare provider if you:    Are pregnant or think you may be pregnant    Are allergic to X-ray dye, also called contrast medium, or any other medicines    Have had any recent illnesses    Have any medical conditions  Tell your healthcare provider about all medicines you take. You may need to stop taking some or all of them before the procedure. This includes:    All prescription medicines    Herbs, vitamins, and other supplements    Over-the-counter medicines such as aspirin or ibuprofen    Street drugs  Have a friend or relative available to drive you home.  During your procedure    You'll change into a hospital gown and lie on an X-ray table. An IV line is put into a vein in your arm or  hand. This is to give you fluids and medicines. You may be given medicine to help you relax. Medicine will be put on the skin at the insertion site to numb it.    A very small cut or incision is made over the insertion site. Then a needle with a thin guide wire is put through your skin into the vein. A thin, flexible tube called a catheter is put over the guide wire into the blood vessel.    X-ray dye is injected into your blood vessel. This helps the veins show up clearly on X-ray images. The radiologist uses these images as a guide. He or she moves the catheter to the narrowed or blocked part of the vein.    When the catheter reaches the narrowed or blocked area, the radiologist inflates a special balloon that is attached to the catheter (angioplasty). This widens the passage through the vein.    A stent may be put in place to hold the vein open. To do this, a catheter with a stent attached is threaded over the guide wire. The stent is opened when it reaches the narrowed area. The stent stays in the vein. The catheters and balloons are taken out.  After your procedure    You may stay in the hospital for a few hours or overnight.    Drink plenty of fluids to help flush the X-ray dye out of your body.    Care for the insertion site as directed.  Possible risks and complications  All procedures have some risk. Possible risks of venous angioplasty with stenting include:    Bruising at the catheter insertion site    Damage to the vein. This includes the blockage getting worse.    Infection    Problems because of X-ray dye. These include allergic reaction or kidney damage.    The vein becomes blocked again. This is called restenosis. This often happens within 6 to 18 months.   Date Last Reviewed: 11/1/2017 2000-2017 [employer]. [address] [place] [address]. All rights reserved. This information is not intended as a substitute for professional medical care. Always follow your healthcare  professional's instructions.

## 2018-04-30 NOTE — IP AVS SNAPSHOT
[facility] [address] [facility]    [address] [place] [address]    Phone:  [phone]                                       After Visit Summary   4/30/2018    Angeli Jacobson    MRN: [id number]           After Visit Summary Signature Page     I have received my discharge instructions, and my questions have been answered. I have discussed any challenges I see with this plan with the nurse or doctor.    ..........................................................................................................................................  Patient/Patient Representative Signature      ..........................................................................................................................................  Patient Representative Print Name and Relationship to Patient    ..................................................               ................................................  Date                                            Time    ..........................................................................................................................................  Reviewed by Signature/Title    ...................................................              ..............................................  Date                                                            Time

## 2018-04-30 NOTE — PLAN OF CARE
Problem: Patient Care Overview  Goal: Plan of Care/Patient Progress Review  Outcome: Improving  A&O. Neuros WDL. Sheath site w/ small amount of oozing, no signs of hematoma, no bruit. Pulses palpable on RLE; LLE DP pulses 2+ w/ doppler, PT pulses 1+ w/ doppler. Calf circumference decreased slightly to 40 cm. Feet slightly cool to touch, improving. Emesis x 2 d/t dizziness that began shortly after loading dose for Dilaudid PCA was given. Zofran and compazine given w/ some relief. Dizziness has resolved now. Strict bedrest. Benitez w/ good amount UO.  at bedside, supportive.

## 2018-04-30 NOTE — CONSULTS
HOSPITALIST CONSULT CHART CHECK:  4/30/2018      Hospitalist service was consulted for cross coverage only. We will peripherally follow and chart check throughout the week.        - For vascular medical concerns during business hours M-F, call the [facility] at [phone] to have the rounding/on call Vascular Medicine (NOT VASCULAR SURGERY) MD paged.      - After business hours M-F, for medical concerns on this patient, please page hospitalist staff.      - For vascular surgical questions, please page the appropriate surgeon (primary vascular surgeon or on call vascular surgeon) based upon the time of day.           ELVA Walton, CNP  Hospitalist Service  [facility]  4/30/2018   3:12 PM  [phone]

## 2018-04-30 NOTE — CONSULTS
[facility]    Vascular Medicine Consultation     Date of Admission:  4/30/2018  Date of Consult (When I saw the patient): 04/30/18    Assessment & Plan   1. Left SFA PTFE interposition graft occlusion with initiation of catheter-directed lysis 4/30/18    It is unclear how acute this graft occlusion is. She does have noted severe hyperlipidemia and has been on estrogen cream since May 2017, both of which likely contributed to this graft occlusion. Discussed with her stopping the estrogen cream. She states this should not be a problem as she was put on this for vaginal atrophy and notices no difference from this. Her lipid-lowering medication will also be adjusted as her LDL is 173 while already on Atorvastatin 80 mg daily. She will return to IR tomorrow for a lysis recheck. If her occlusion is unable to be opened, then she would require surgical repair.     2. Atrophic vaginitis    She was just started on Estrace by Urology in 5/2017. She states she had no real symptoms in regards to this. Given her graft occlusion, it is recommended that she discontinue this.     3. Hyperlipidemia    Her lipid panel this admission was significant for an LDL of 173, HDL 68, triglycerides 68, and total cholesterol 255. She has been on Atorvastatin 80 mg daily for as long as she can remember. She relays a family history of CAD with her father and grandfather passing from an MI and her cousin undergoing triple bypass surgery at age 36. Most of her family has had trouble with hyperlipidemia, including 2 of her 3 sons. She should have tighter control of her lipids and will change Atorvastatin to Crestor 40 mg daily. She should have a lipid panel rechecked in 3 months. If her lipids are still not at goal (LDL<70), then Zetia 10 mg daily could be added at that time.     4. Depression    This has been stable. Continue bupropion.       Reason for Consult   Reason for consult: Asked by Dr. Owens to evaluate vascular risk  factors and assist with medical management in this 68 year old female non-smoker, non-diabetic with hyperlipidemia and history of excision of a left thigh mass along with artery and vein requiring a left PTFE interposition graft in 2000 who now presents with an occluded graft.    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 The mass was excised along with the vein and artery in the left thigh and her SFA was reconstructed utilizing a GoreTex graft. She had been doing well up until the past 2.5 months when she has noted increasing fatigue in her left leg when ambulating along with some left calf cramping. She denies any rest pain or non-healing wounds.  ABIs were normal on the right and 0.59 on the left at rest, dropping to 0.39 following exercise. She was evaluated by Dr. Owens of Vascular Surgery and an angiogram was recommended, for which she presented today. Her graft was found to be occluded and catheter-directed lysis has been initiated in an attempt to reopen the graft.     Past Medical History   Past Medical History:   Diagnosis Date     * * * SBE PROPHYLAXIS * * * 1998    Amox 500mg, take 4 tabs one hour prior to procedure.Takes this because of lymphedema secondary from leg surgey     Central serous retinopathy 2001    Resolved 9/2001     CHRONIC NECK PAIN 1995     Depressive disorder, not elsewhere classified 2001     MIXED HYPERLIPIDEMIA, LDL GOAL <160 1998    LDL goal < 160     MYXOID LIPOSARCOMA 2000    Left thigh, S/P excision, [facility]  at 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    CHRONIC LEFT THIGH LYMPHEDEMA     Nontoxic multinodular goiter 2005    needs yearly      Osteoporosis, unspecified 2001     Other lymphedema 2000    left thigh, gets regular PT for this      PAD (peripheral artery disease) (H) 2018     SHINGLES      Sprain of lumbosacral (joint) (ligament)     right     Unspecified hearing loss 1998    chronic tinnitus     Unspecified tinnitus        Past Surgical History   Past Surgical History:   Procedure Laterality Date     C APPENDECTOMY      Appendectomy     C NONSPECIFIC PROCEDURE  2000    Open Biopsy Left Thigh Liposarcoma     COLONOSCOPY N/A 2016    Procedure: COMBINED COLONOSCOPY, SINGLE OR MULTIPLE BIOPSY/POLYPECTOMY BY BIOPSY;  Surgeon: Varun Stanley MD, MD;  Location: [facility]     EXCISION MALIG LESION>1.25CM  2000    Myxoid Liposarcoma       HC COLONOSCOPY THRU STOMA, DIAGNOSTIC      due      HC COLP CERVIX/UPPER VAGINA  1997    Negative     HC DILATION/CURETTAGE DIAG/THER NON OB  1997    Post menopausal bleeding on HRT, negative       Prior to Admission Medications   Prior to Admission Medications   Prescriptions Last Dose Informant Patient Reported? Taking?   MULTI-VITAMIN OR TABS 2018 at Unknown time  Yes Yes   Si TABLET DAILY   ORDER FOR DME Past Week at Unknown time  No Yes   Sig: Equipment being ordered: lymphedema bandages   Omega-3 Fatty Acids (FISH OIL PO) Past Week at Unknown time  Yes Yes   Sig: Take  by mouth.   alendronate (FOSAMAX) 70 MG tablet Past Week at Unknown time  No Yes   Sig: Take 1 tablet (70 mg) by mouth with 8oz water every 7 days 30 minutes before breakfast and remain upright during this time.   aspirin 81 MG tablet 2018 at Unknown time  No Yes   Sig: Take 1 tablet by mouth daily.   atorvastatin (LIPITOR) 80 MG tablet 2018 at Unknown time  No Yes   Sig: Take 1 tablet (80 mg) by mouth daily   buPROPion (WELLBUTRIN XL) 300 MG 24 hr tablet 2018 at Unknown time  No Yes   Sig: Take 1 tablet (300 mg) by mouth every morning   estradiol (ESTRACE VAGINAL) 0.1 MG/GM cream Past Week at Unknown time  No Yes   Sig: Place 2 g vaginally three times a week   order for DME Past  Week at Unknown time  No Yes   Sig: Equipment being ordered: Left Thigh High Compression Stocking, 550 CCL.3      Facility-Administered Medications: None     Allergies   Allergies   Allergen Reactions     Celexa [Citalopram Hydrobromide]      Decreased libido       Social History   Angeli Jacobson  reports that she has never smoked. She has never used smokeless tobacco. She reports that she does not drink alcohol or use illicit drugs. She lives with her . She has 3 sons. 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 Most recently she helps take care of grandchildren.     Family History   Family History   Problem Relation Age of Onset     C.A.D. Father      MI 57     Alcohol/Drug Father      etoh     Obesity Mother      OSTEOPOROSIS Mother      Colon Cancer Brother 70     Hyperlipidemia Son      Hyperlipidemia Son      very high, experimental drug     C.A.D. Paternal Grandmother      ascvd     DIABETES Maternal Grandmother      CANCER Maternal Grandmother      C.A.D. Paternal Uncle      Mi  age 48     CANCER Maternal Aunt      pancreatic CA       Review of Systems   The 10 point Review of Systems is negative other than noted in the HPI or here.     Physical Exam   Temp: 97.6  F (36.4  C) Temp src: Axillary BP: 125/69 Pulse: 77 Heart Rate: 60 Resp: 8 SpO2: 97 % O2 Device: None (Room air)    Vital Signs with Ranges  Temp:  [97.6  F (36.4  C)-97.9  F (36.6  C)] 97.6  F (36.4  C)  Pulse:  [77] 77  Heart Rate:  [60-80] 60  Resp:  [7-26] 8  BP: (117-155)/(69-85) 125/69  SpO2:  [93 %-100 %] 97 %  150 lbs 15.99 oz    Constitutional: awake, alert, cooperative, no apparent distress, and appears stated age  Eyes: Lids and lashes normal, pupils equal, round and reactive to light, extra ocular muscles intact, sclera clear, conjunctiva normal  ENT: normocepalic, without obvious abnormality, oropharynx pink and moist  Hematologic / Lymphatic: no lymphadenopathy  Respiratory: No increased work of breathing,  good air exchange, clear to auscultation bilaterally, no crackles or wheezing  Cardiovascular: regular rate and rhythm, normal S1 and S2 and no murmur noted  GI: Normal bowel sounds, soft, non-distended, non-tender  Skin: no redness, warmth, or swelling, no rashes and no lesions  Musculoskeletal: There is no redness, warmth, or swelling of the joints.  Full range of motion noted.  Motor strength is 5 out of 5 all extremities bilaterally.  Tone is normal.  Neurologic: Awake, alert, oriented to name, place and time.  Cranial nerves II-XII are grossly intact.  Motor is 5 out of 5 bilaterally.    Neuropsychiatric:  Normal affect, memory, insight.  Pulses: Monophasic left pedal pulses with doppler. Palpable pedal pulses on the right. No carotid bruits appreciated.     Data   Most Recent 3 CBC's:  Recent Labs   Lab Test  04/30/18   0740  08/24/17   0929  05/29/15   1125   WBC  5.7  5.0  5.3   HGB  13.7  14.4  14.2   MCV  91  93  94   PLT  236  262  257     Most Recent 3 BMP's:  Recent Labs   Lab Test  04/30/18   0740  08/24/17   0929  09/29/16   0846  08/11/15   1008  05/29/15   1125   NA   --   141  141   --   140   POTASSIUM   --   4.1  4.2   --   4.5   CHLORIDE   --   106  108   --   106   CO2   --   27  30   --   29   BUN   --   15  12   --   17   CR  0.71  0.81  0.74   --   0.74   ANIONGAP   --   8  3   --   5   LONG   --   9.3  9.2   --   9.1   GLC   --   86  80  89  74     Most Recent 3 INR's:  Recent Labs   Lab Test  04/30/18   0740   INR  0.96     Most Recent Cholesterol Panel:  Recent Labs   Lab Test  04/30/18   0740   CHOL  255*   LDL  173*   HDL  68   TRIG  68     Most Recent Hemoglobin A1c:  Recent Labs   Lab Test  04/30/18   0740   A1C  5.8

## 2018-04-30 NOTE — PROGRESS NOTES
Interventional Radiology Intra-procedural Nursing Note    Patient Name: Angeli Jacobson  Medical Record Number: [id number]  Today's Date: April 30, 2018    Start Time: 0920  End of procedure time: 1008  Procedure: Abdominal Aortogram, left lower leg angiogram and lytic initiation  Report given to: Evie SHARPE, Station 33  Time pt departs:  1040      Other Notes: Patient in IR for left lower extremity angiogram. VSS on arrival to IR. 5F sheath in R femoral artery at 0929. 6F sheath exchange at 1005. 100cm infusion catheter inserted in sheath. TPA infusion started through infusion catheter and heparin infusion started through sheath. 100 mcg fentanyl and 2 mg midazolam given for sedation. Patient tolerated procedure. Sheath left in place, WNL. Dressing C/D/I. Calf circumference 42cm. RN RN handoff on station 33.    Crystal Wallace RN

## 2018-04-30 NOTE — PROCEDURES
Pre op:  Thrombosed left SFA graft  Post op:  Same  Proc:  Left leg Agram with initiation of thrombolysis  Roman  No complications  See dictation

## 2018-04-30 NOTE — PROGRESS NOTES
Post Procedure Check    Resting comfortably.  No complaints.  AVSS  Right femoral access with quarter sized spotting - no hematoma  Dopplerable left pedals    Stable s/p initiation of thrombolysis.    Lytic recheck in am.    Elijah Owens MD

## 2018-05-01 ENCOUNTER — APPOINTMENT (OUTPATIENT)
Dept: INTERVENTIONAL RADIOLOGY/VASCULAR | Facility: CLINIC | Age: 68
DRG: 271 | End: 2018-05-01
Attending: SURGERY
Payer: MEDICARE

## 2018-05-01 ENCOUNTER — ANESTHESIA (OUTPATIENT)
Dept: SURGERY | Facility: CLINIC | Age: 68
DRG: 271 | End: 2018-05-01
Payer: MEDICARE

## 2018-05-01 ENCOUNTER — ANESTHESIA EVENT (OUTPATIENT)
Dept: SURGERY | Facility: CLINIC | Age: 68
DRG: 271 | End: 2018-05-01
Payer: MEDICARE

## 2018-05-01 ENCOUNTER — APPOINTMENT (OUTPATIENT)
Dept: SURGERY | Facility: PHYSICIAN GROUP | Age: 68
End: 2018-05-01
Payer: COMMERCIAL

## 2018-05-01 LAB
ABO + RH BLD: NORMAL
ABO + RH BLD: NORMAL
ANION GAP SERPL CALCULATED.3IONS-SCNC: 11 MMOL/L (ref 3–14)
APTT PPP: 32 SEC (ref 22–37)
BASOPHILS # BLD AUTO: 0 10E9/L (ref 0–0.2)
BASOPHILS # BLD AUTO: 0 10E9/L (ref 0–0.2)
BASOPHILS NFR BLD AUTO: 0.1 %
BASOPHILS NFR BLD AUTO: 0.2 %
BLD GP AB SCN SERPL QL: NORMAL
BLOOD BANK CMNT PATIENT-IMP: NORMAL
BUN SERPL-MCNC: 12 MG/DL (ref 7–30)
CALCIUM SERPL-MCNC: 7.6 MG/DL (ref 8.5–10.1)
CHLORIDE SERPL-SCNC: 107 MMOL/L (ref 94–109)
CO2 SERPL-SCNC: 22 MMOL/L (ref 20–32)
CREAT SERPL-MCNC: 0.56 MG/DL (ref 0.52–1.04)
DIFFERENTIAL METHOD BLD: ABNORMAL
DIFFERENTIAL METHOD BLD: NORMAL
EOSINOPHIL # BLD AUTO: 0 10E9/L (ref 0–0.7)
EOSINOPHIL # BLD AUTO: 0 10E9/L (ref 0–0.7)
EOSINOPHIL NFR BLD AUTO: 0 %
EOSINOPHIL NFR BLD AUTO: 0.4 %
ERYTHROCYTE [DISTWIDTH] IN BLOOD BY AUTOMATED COUNT: 13.3 % (ref 10–15)
ERYTHROCYTE [DISTWIDTH] IN BLOOD BY AUTOMATED COUNT: 13.5 % (ref 10–15)
ERYTHROCYTE [DISTWIDTH] IN BLOOD BY AUTOMATED COUNT: 13.6 % (ref 10–15)
GFR SERPL CREATININE-BSD FRML MDRD: >90 ML/MIN/1.7M2
GLUCOSE SERPL-MCNC: 102 MG/DL (ref 70–99)
GLUCOSE SERPL-MCNC: 89 MG/DL (ref 70–99)
HCT VFR BLD AUTO: 27.8 % (ref 35–47)
HCT VFR BLD AUTO: 29.7 % (ref 35–47)
HCT VFR BLD AUTO: 37.2 % (ref 35–47)
HGB BLD-MCNC: 10 G/DL (ref 11.7–15.7)
HGB BLD-MCNC: 12.3 G/DL (ref 11.7–15.7)
HGB BLD-MCNC: 9.5 G/DL (ref 11.7–15.7)
IMM GRANULOCYTES # BLD: 0 10E9/L (ref 0–0.4)
IMM GRANULOCYTES # BLD: 0 10E9/L (ref 0–0.4)
IMM GRANULOCYTES NFR BLD: 0.1 %
IMM GRANULOCYTES NFR BLD: 0.2 %
INR PPP: 1.43 (ref 0.86–1.14)
LMWH PPP CHRO-ACNC: <0.1 IU/ML
LYMPHOCYTES # BLD AUTO: 1.2 10E9/L (ref 0.8–5.3)
LYMPHOCYTES # BLD AUTO: 1.6 10E9/L (ref 0.8–5.3)
LYMPHOCYTES NFR BLD AUTO: 12.3 %
LYMPHOCYTES NFR BLD AUTO: 19.4 %
MAGNESIUM SERPL-MCNC: 1.8 MG/DL (ref 1.6–2.3)
MCH RBC QN AUTO: 30 PG (ref 26.5–33)
MCH RBC QN AUTO: 30.7 PG (ref 26.5–33)
MCH RBC QN AUTO: 31.1 PG (ref 26.5–33)
MCHC RBC AUTO-ENTMCNC: 33.1 G/DL (ref 31.5–36.5)
MCHC RBC AUTO-ENTMCNC: 33.7 G/DL (ref 31.5–36.5)
MCHC RBC AUTO-ENTMCNC: 34.2 G/DL (ref 31.5–36.5)
MCV RBC AUTO: 91 FL (ref 78–100)
MONOCYTES # BLD AUTO: 0.5 10E9/L (ref 0–1.3)
MONOCYTES # BLD AUTO: 0.7 10E9/L (ref 0–1.3)
MONOCYTES NFR BLD AUTO: 4.6 %
MONOCYTES NFR BLD AUTO: 8 %
NEUTROPHILS # BLD AUTO: 6 10E9/L (ref 1.6–8.3)
NEUTROPHILS # BLD AUTO: 8.1 10E9/L (ref 1.6–8.3)
NEUTROPHILS NFR BLD AUTO: 71.9 %
NEUTROPHILS NFR BLD AUTO: 82.8 %
NRBC # BLD AUTO: 0 10*3/UL
NRBC # BLD AUTO: 0 10*3/UL
NRBC BLD AUTO-RTO: 0 /100
NRBC BLD AUTO-RTO: 0 /100
PLATELET # BLD AUTO: 136 10E9/L (ref 150–450)
PLATELET # BLD AUTO: 152 10E9/L (ref 150–450)
PLATELET # BLD AUTO: 161 10E9/L (ref 150–450)
POTASSIUM SERPL-SCNC: 3.8 MMOL/L (ref 3.4–5.3)
RBC # BLD AUTO: 3.05 10E12/L (ref 3.8–5.2)
RBC # BLD AUTO: 3.26 10E12/L (ref 3.8–5.2)
RBC # BLD AUTO: 4.1 10E12/L (ref 3.8–5.2)
SODIUM SERPL-SCNC: 140 MMOL/L (ref 133–144)
SPECIMEN EXP DATE BLD: NORMAL
WBC # BLD AUTO: 10.2 10E9/L (ref 4–11)
WBC # BLD AUTO: 8.4 10E9/L (ref 4–11)
WBC # BLD AUTO: 9.8 10E9/L (ref 4–11)

## 2018-05-01 PROCEDURE — 25000128 H RX IP 250 OP 636: Performed by: SURGERY

## 2018-05-01 PROCEDURE — 37000009 ZZH ANESTHESIA TECHNICAL FEE, EACH ADDTL 15 MIN: Performed by: SURGERY

## 2018-05-01 PROCEDURE — 047L34Z DILATION OF LEFT FEMORAL ARTERY WITH DRUG-ELUTING INTRALUMINAL DEVICE, PERCUTANEOUS APPROACH: ICD-10-PCS | Performed by: RADIOLOGY

## 2018-05-01 PROCEDURE — 99233 SBSQ HOSP IP/OBS HIGH 50: CPT | Performed by: INTERNAL MEDICINE

## 2018-05-01 PROCEDURE — 86901 BLOOD TYPING SEROLOGIC RH(D): CPT | Performed by: SURGERY

## 2018-05-01 PROCEDURE — 85027 COMPLETE CBC AUTOMATED: CPT | Performed by: SURGERY

## 2018-05-01 PROCEDURE — 37000008 ZZH ANESTHESIA TECHNICAL FEE, 1ST 30 MIN: Performed by: SURGERY

## 2018-05-01 PROCEDURE — 85730 THROMBOPLASTIN TIME PARTIAL: CPT | Performed by: SURGERY

## 2018-05-01 PROCEDURE — 25000125 ZZHC RX 250: Performed by: NURSE ANESTHETIST, CERTIFIED REGISTERED

## 2018-05-01 PROCEDURE — 27210906 ZZH KIT CR8

## 2018-05-01 PROCEDURE — A9270 NON-COVERED ITEM OR SERVICE: HCPCS | Mod: GY | Performed by: PHYSICIAN ASSISTANT

## 2018-05-01 PROCEDURE — 25000128 H RX IP 250 OP 636: Performed by: NURSE ANESTHETIST, CERTIFIED REGISTERED

## 2018-05-01 PROCEDURE — 36415 COLL VENOUS BLD VENIPUNCTURE: CPT | Performed by: PHYSICIAN ASSISTANT

## 2018-05-01 PROCEDURE — 35226 REPAIR BLOOD VESSEL DIR LXTR: CPT | Mod: RT | Performed by: SURGERY

## 2018-05-01 PROCEDURE — 04CL3ZZ EXTIRPATION OF MATTER FROM LEFT FEMORAL ARTERY, PERCUTANEOUS APPROACH: ICD-10-PCS | Performed by: RADIOLOGY

## 2018-05-01 PROCEDURE — 25000566 ZZH SEVOFLURANE, EA 15 MIN: Performed by: SURGERY

## 2018-05-01 PROCEDURE — 36415 COLL VENOUS BLD VENIPUNCTURE: CPT | Performed by: SURGERY

## 2018-05-01 PROCEDURE — 25000128 H RX IP 250 OP 636

## 2018-05-01 PROCEDURE — 85520 HEPARIN ASSAY: CPT | Performed by: PHYSICIAN ASSISTANT

## 2018-05-01 PROCEDURE — 27210742 ZZH CATH CR1

## 2018-05-01 PROCEDURE — A9270 NON-COVERED ITEM OR SERVICE: HCPCS | Mod: GY | Performed by: SURGERY

## 2018-05-01 PROCEDURE — 25000125 ZZHC RX 250

## 2018-05-01 PROCEDURE — 36000058 ZZH SURGERY LEVEL 3 EA 15 ADDTL MIN: Performed by: SURGERY

## 2018-05-01 PROCEDURE — 83735 ASSAY OF MAGNESIUM: CPT | Performed by: SURGERY

## 2018-05-01 PROCEDURE — 85610 PROTHROMBIN TIME: CPT | Performed by: SURGERY

## 2018-05-01 PROCEDURE — 85025 COMPLETE CBC W/AUTO DIFF WBC: CPT | Performed by: SURGERY

## 2018-05-01 PROCEDURE — 25000132 ZZH RX MED GY IP 250 OP 250 PS 637: Mod: GY | Performed by: PHYSICIAN ASSISTANT

## 2018-05-01 PROCEDURE — 12000007 ZZH R&B INTERMEDIATE

## 2018-05-01 PROCEDURE — 27210845 ZZH DEVICE INFLATION CR5

## 2018-05-01 PROCEDURE — 80048 BASIC METABOLIC PNL TOTAL CA: CPT | Performed by: PHYSICIAN ASSISTANT

## 2018-05-01 PROCEDURE — 86900 BLOOD TYPING SEROLOGIC ABO: CPT | Performed by: SURGERY

## 2018-05-01 PROCEDURE — C1757 CATH, THROMBECTOMY/EMBOLECT: HCPCS

## 2018-05-01 PROCEDURE — 99152 MOD SED SAME PHYS/QHP 5/>YRS: CPT

## 2018-05-01 PROCEDURE — C9399 UNCLASSIFIED DRUGS OR BIOLOG: HCPCS | Performed by: NURSE ANESTHETIST, CERTIFIED REGISTERED

## 2018-05-01 PROCEDURE — 27210740 ZZH ACCESSORY CR3

## 2018-05-01 PROCEDURE — 25000128 H RX IP 250 OP 636: Performed by: RADIOLOGY

## 2018-05-01 PROCEDURE — 25000132 ZZH RX MED GY IP 250 OP 250 PS 637: Mod: GY | Performed by: SURGERY

## 2018-05-01 PROCEDURE — 82947 ASSAY GLUCOSE BLOOD QUANT: CPT | Performed by: SURGERY

## 2018-05-01 PROCEDURE — 36000056 ZZH SURGERY LEVEL 3 1ST 30 MIN: Performed by: SURGERY

## 2018-05-01 PROCEDURE — 04CS3ZZ EXTIRPATION OF MATTER FROM LEFT POSTERIOR TIBIAL ARTERY, PERCUTANEOUS APPROACH: ICD-10-PCS | Performed by: RADIOLOGY

## 2018-05-01 PROCEDURE — 25000128 H RX IP 250 OP 636: Performed by: ANESTHESIOLOGY

## 2018-05-01 PROCEDURE — 71000013 ZZH RECOVERY PHASE 1 LEVEL 1 EA ADDTL HR: Performed by: SURGERY

## 2018-05-01 PROCEDURE — 86850 RBC ANTIBODY SCREEN: CPT | Performed by: SURGERY

## 2018-05-01 PROCEDURE — 85025 COMPLETE CBC W/AUTO DIFF WBC: CPT | Performed by: PHYSICIAN ASSISTANT

## 2018-05-01 PROCEDURE — C1769 GUIDE WIRE: HCPCS

## 2018-05-01 PROCEDURE — 25000125 ZZHC RX 250: Performed by: SURGERY

## 2018-05-01 PROCEDURE — 71000012 ZZH RECOVERY PHASE 1 LEVEL 1 FIRST HR: Performed by: SURGERY

## 2018-05-01 PROCEDURE — 04CU3ZZ EXTIRPATION OF MATTER FROM LEFT PERONEAL ARTERY, PERCUTANEOUS APPROACH: ICD-10-PCS | Performed by: RADIOLOGY

## 2018-05-01 PROCEDURE — 27210794 ZZH OR GENERAL SUPPLY STERILE: Performed by: SURGERY

## 2018-05-01 PROCEDURE — 04QK0ZZ REPAIR RIGHT FEMORAL ARTERY, OPEN APPROACH: ICD-10-PCS | Performed by: SURGERY

## 2018-05-01 RX ORDER — FENTANYL CITRATE 50 UG/ML
25-50 INJECTION, SOLUTION INTRAMUSCULAR; INTRAVENOUS EVERY 5 MIN PRN
Status: DISCONTINUED | OUTPATIENT
Start: 2018-05-01 | End: 2018-05-01 | Stop reason: HOSPADM

## 2018-05-01 RX ORDER — HYDROMORPHONE HYDROCHLORIDE 1 MG/ML
.3-.5 INJECTION, SOLUTION INTRAMUSCULAR; INTRAVENOUS; SUBCUTANEOUS EVERY 5 MIN PRN
Status: DISCONTINUED | OUTPATIENT
Start: 2018-05-01 | End: 2018-05-01 | Stop reason: HOSPADM

## 2018-05-01 RX ORDER — SODIUM CHLORIDE 9 MG/ML
INJECTION, SOLUTION INTRAVENOUS CONTINUOUS
Status: CANCELLED | OUTPATIENT
Start: 2018-05-01

## 2018-05-01 RX ORDER — LABETALOL HYDROCHLORIDE 5 MG/ML
10 INJECTION, SOLUTION INTRAVENOUS
Status: DISCONTINUED | OUTPATIENT
Start: 2018-05-01 | End: 2018-05-01 | Stop reason: HOSPADM

## 2018-05-01 RX ORDER — CEFAZOLIN SODIUM 2 G/100ML
2 INJECTION, SOLUTION INTRAVENOUS
Status: DISCONTINUED | OUTPATIENT
Start: 2018-05-01 | End: 2018-05-01

## 2018-05-01 RX ORDER — ONDANSETRON 4 MG/1
4 TABLET, ORALLY DISINTEGRATING ORAL EVERY 30 MIN PRN
Status: DISCONTINUED | OUTPATIENT
Start: 2018-05-01 | End: 2018-05-01 | Stop reason: HOSPADM

## 2018-05-01 RX ORDER — NALOXONE HYDROCHLORIDE 0.4 MG/ML
.1-.4 INJECTION, SOLUTION INTRAMUSCULAR; INTRAVENOUS; SUBCUTANEOUS
Status: DISCONTINUED | OUTPATIENT
Start: 2018-05-01 | End: 2018-05-01 | Stop reason: HOSPADM

## 2018-05-01 RX ORDER — CLOPIDOGREL BISULFATE 75 MG/1
300 TABLET ORAL ONCE
Status: DISCONTINUED | OUTPATIENT
Start: 2018-05-01 | End: 2018-05-01

## 2018-05-01 RX ORDER — FENTANYL CITRATE 50 UG/ML
INJECTION, SOLUTION INTRAMUSCULAR; INTRAVENOUS
Status: COMPLETED
Start: 2018-05-01 | End: 2018-05-01

## 2018-05-01 RX ORDER — FENTANYL CITRATE 50 UG/ML
25-50 INJECTION, SOLUTION INTRAMUSCULAR; INTRAVENOUS
Status: DISCONTINUED | OUTPATIENT
Start: 2018-05-01 | End: 2018-05-01 | Stop reason: HOSPADM

## 2018-05-01 RX ORDER — GLYCOPYRROLATE 0.2 MG/ML
INJECTION, SOLUTION INTRAMUSCULAR; INTRAVENOUS PRN
Status: DISCONTINUED | OUTPATIENT
Start: 2018-05-01 | End: 2018-05-01

## 2018-05-01 RX ORDER — CEFAZOLIN SODIUM 1 G/3ML
INJECTION, POWDER, FOR SOLUTION INTRAMUSCULAR; INTRAVENOUS PRN
Status: DISCONTINUED | OUTPATIENT
Start: 2018-05-01 | End: 2018-05-01

## 2018-05-01 RX ORDER — DEXAMETHASONE SODIUM PHOSPHATE 4 MG/ML
INJECTION, SOLUTION INTRA-ARTICULAR; INTRALESIONAL; INTRAMUSCULAR; INTRAVENOUS; SOFT TISSUE PRN
Status: DISCONTINUED | OUTPATIENT
Start: 2018-05-01 | End: 2018-05-01

## 2018-05-01 RX ORDER — CEFAZOLIN SODIUM 1 G/3ML
1 INJECTION, POWDER, FOR SOLUTION INTRAMUSCULAR; INTRAVENOUS SEE ADMIN INSTRUCTIONS
Status: DISCONTINUED | OUTPATIENT
Start: 2018-05-01 | End: 2018-05-01

## 2018-05-01 RX ORDER — SODIUM CHLORIDE, SODIUM LACTATE, POTASSIUM CHLORIDE, CALCIUM CHLORIDE 600; 310; 30; 20 MG/100ML; MG/100ML; MG/100ML; MG/100ML
INJECTION, SOLUTION INTRAVENOUS CONTINUOUS PRN
Status: DISCONTINUED | OUTPATIENT
Start: 2018-05-01 | End: 2018-05-01

## 2018-05-01 RX ORDER — SODIUM CHLORIDE 9 MG/ML
INJECTION, SOLUTION INTRAVENOUS ONCE
Status: COMPLETED | OUTPATIENT
Start: 2018-05-01 | End: 2018-05-01

## 2018-05-01 RX ORDER — MEPERIDINE HYDROCHLORIDE 25 MG/ML
12.5 INJECTION INTRAMUSCULAR; INTRAVENOUS; SUBCUTANEOUS ONCE
Status: COMPLETED | OUTPATIENT
Start: 2018-05-01 | End: 2018-05-01

## 2018-05-01 RX ORDER — LIDOCAINE HYDROCHLORIDE 10 MG/ML
1-30 INJECTION, SOLUTION EPIDURAL; INFILTRATION; INTRACAUDAL; PERINEURAL
Status: COMPLETED | OUTPATIENT
Start: 2018-05-01 | End: 2018-05-01

## 2018-05-01 RX ORDER — SODIUM CHLORIDE 9 MG/ML
INJECTION, SOLUTION INTRAVENOUS CONTINUOUS
Status: DISCONTINUED | OUTPATIENT
Start: 2018-05-01 | End: 2018-05-02

## 2018-05-01 RX ORDER — ROSUVASTATIN CALCIUM 20 MG/1
40 TABLET, COATED ORAL AT BEDTIME
Status: DISCONTINUED | OUTPATIENT
Start: 2018-05-01 | End: 2018-05-03 | Stop reason: HOSPADM

## 2018-05-01 RX ORDER — BUPIVACAINE HYDROCHLORIDE AND EPINEPHRINE 5; 5 MG/ML; UG/ML
INJECTION, SOLUTION PERINEURAL PRN
Status: DISCONTINUED | OUTPATIENT
Start: 2018-05-01 | End: 2018-05-01 | Stop reason: HOSPADM

## 2018-05-01 RX ORDER — FLUMAZENIL 0.1 MG/ML
0.2 INJECTION, SOLUTION INTRAVENOUS
Status: DISCONTINUED | OUTPATIENT
Start: 2018-05-01 | End: 2018-05-01 | Stop reason: HOSPADM

## 2018-05-01 RX ORDER — LIDOCAINE HYDROCHLORIDE 10 MG/ML
INJECTION, SOLUTION INFILTRATION; PERINEURAL
Status: DISCONTINUED
Start: 2018-05-01 | End: 2018-05-01 | Stop reason: HOSPADM

## 2018-05-01 RX ORDER — ONDANSETRON 2 MG/ML
4 INJECTION INTRAMUSCULAR; INTRAVENOUS EVERY 30 MIN PRN
Status: DISCONTINUED | OUTPATIENT
Start: 2018-05-01 | End: 2018-05-01 | Stop reason: HOSPADM

## 2018-05-01 RX ORDER — NEOSTIGMINE METHYLSULFATE 1 MG/ML
VIAL (ML) INJECTION PRN
Status: DISCONTINUED | OUTPATIENT
Start: 2018-05-01 | End: 2018-05-01

## 2018-05-01 RX ORDER — IODIXANOL 320 MG/ML
50 INJECTION, SOLUTION INTRAVASCULAR ONCE
Status: COMPLETED | OUTPATIENT
Start: 2018-05-01 | End: 2018-05-01

## 2018-05-01 RX ORDER — LIDOCAINE HYDROCHLORIDE 20 MG/ML
INJECTION, SOLUTION INFILTRATION; PERINEURAL PRN
Status: DISCONTINUED | OUTPATIENT
Start: 2018-05-01 | End: 2018-05-01

## 2018-05-01 RX ORDER — NALOXONE HYDROCHLORIDE 0.4 MG/ML
.1-.4 INJECTION, SOLUTION INTRAMUSCULAR; INTRAVENOUS; SUBCUTANEOUS
Status: DISCONTINUED | OUTPATIENT
Start: 2018-05-01 | End: 2018-05-01

## 2018-05-01 RX ORDER — SODIUM CHLORIDE, SODIUM LACTATE, POTASSIUM CHLORIDE, CALCIUM CHLORIDE 600; 310; 30; 20 MG/100ML; MG/100ML; MG/100ML; MG/100ML
INJECTION, SOLUTION INTRAVENOUS CONTINUOUS
Status: DISCONTINUED | OUTPATIENT
Start: 2018-05-01 | End: 2018-05-01 | Stop reason: HOSPADM

## 2018-05-01 RX ORDER — PROPOFOL 10 MG/ML
INJECTION, EMULSION INTRAVENOUS PRN
Status: DISCONTINUED | OUTPATIENT
Start: 2018-05-01 | End: 2018-05-01

## 2018-05-01 RX ADMIN — SODIUM CHLORIDE: 9 INJECTION, SOLUTION INTRAVENOUS at 09:44

## 2018-05-01 RX ADMIN — LIDOCAINE HYDROCHLORIDE 3 ML: 10 INJECTION, SOLUTION INFILTRATION; PERINEURAL at 14:55

## 2018-05-01 RX ADMIN — PHENYLEPHRINE HYDROCHLORIDE 100 MCG: 10 INJECTION, SOLUTION INTRAMUSCULAR; INTRAVENOUS; SUBCUTANEOUS at 16:48

## 2018-05-01 RX ADMIN — MIDAZOLAM HYDROCHLORIDE 1 MG: 1 INJECTION, SOLUTION INTRAMUSCULAR; INTRAVENOUS at 13:39

## 2018-05-01 RX ADMIN — MIDAZOLAM HYDROCHLORIDE 0.5 MG: 1 INJECTION, SOLUTION INTRAMUSCULAR; INTRAVENOUS at 14:10

## 2018-05-01 RX ADMIN — MIDAZOLAM HYDROCHLORIDE 1 MG: 1 INJECTION, SOLUTION INTRAMUSCULAR; INTRAVENOUS at 14:55

## 2018-05-01 RX ADMIN — MIDAZOLAM 2 MG: 1 INJECTION INTRAMUSCULAR; INTRAVENOUS at 16:47

## 2018-05-01 RX ADMIN — ROSUVASTATIN CALCIUM 40 MG: 20 TABLET, FILM COATED ORAL at 23:04

## 2018-05-01 RX ADMIN — PROCHLORPERAZINE EDISYLATE 5 MG: 5 INJECTION INTRAMUSCULAR; INTRAVENOUS at 05:20

## 2018-05-01 RX ADMIN — FENTANYL CITRATE 25 MCG: 50 INJECTION, SOLUTION INTRAMUSCULAR; INTRAVENOUS at 13:53

## 2018-05-01 RX ADMIN — SUCCINYLCHOLINE CHLORIDE 100 MG: 20 INJECTION, SOLUTION INTRAMUSCULAR; INTRAVENOUS; PARENTERAL at 16:48

## 2018-05-01 RX ADMIN — FENTANYL CITRATE 100 MCG: 50 INJECTION, SOLUTION INTRAMUSCULAR; INTRAVENOUS at 16:48

## 2018-05-01 RX ADMIN — NEOSTIGMINE METHYLSULFATE 3.5 MG: 1 INJECTION, SOLUTION INTRAVENOUS at 18:00

## 2018-05-01 RX ADMIN — HEPARIN SODIUM 750 UNITS/HR: 10000 INJECTION, SOLUTION INTRAVENOUS at 19:57

## 2018-05-01 RX ADMIN — SODIUM CHLORIDE, POTASSIUM CHLORIDE, SODIUM LACTATE AND CALCIUM CHLORIDE: 600; 310; 30; 20 INJECTION, SOLUTION INTRAVENOUS at 16:45

## 2018-05-01 RX ADMIN — IODIXANOL 75 ML: 320 INJECTION, SOLUTION INTRAVASCULAR at 15:26

## 2018-05-01 RX ADMIN — SODIUM CHLORIDE: 9 INJECTION, SOLUTION INTRAVENOUS at 09:42

## 2018-05-01 RX ADMIN — ROCURONIUM BROMIDE 40 MG: 10 INJECTION INTRAVENOUS at 17:06

## 2018-05-01 RX ADMIN — FENTANYL CITRATE 50 MCG: 50 INJECTION, SOLUTION INTRAMUSCULAR; INTRAVENOUS at 13:39

## 2018-05-01 RX ADMIN — HEPARIN SODIUM 10000 UNITS: 10000 INJECTION, SOLUTION INTRAVENOUS; SUBCUTANEOUS at 13:53

## 2018-05-01 RX ADMIN — MIDAZOLAM HYDROCHLORIDE 0.5 MG: 1 INJECTION, SOLUTION INTRAMUSCULAR; INTRAVENOUS at 13:53

## 2018-05-01 RX ADMIN — PROPOFOL 150 MG: 10 INJECTION, EMULSION INTRAVENOUS at 16:48

## 2018-05-01 RX ADMIN — FENTANYL CITRATE 25 MCG: 50 INJECTION, SOLUTION INTRAMUSCULAR; INTRAVENOUS at 14:11

## 2018-05-01 RX ADMIN — FENTANYL CITRATE 50 MCG: 50 INJECTION, SOLUTION INTRAMUSCULAR; INTRAVENOUS at 14:55

## 2018-05-01 RX ADMIN — DEXAMETHASONE SODIUM PHOSPHATE 4 MG: 4 INJECTION, SOLUTION INTRA-ARTICULAR; INTRALESIONAL; INTRAMUSCULAR; INTRAVENOUS; SOFT TISSUE at 17:28

## 2018-05-01 RX ADMIN — GLYCOPYRROLATE 0.7 MG: 0.2 INJECTION, SOLUTION INTRAMUSCULAR; INTRAVENOUS at 18:00

## 2018-05-01 RX ADMIN — SODIUM CHLORIDE: 9 INJECTION, SOLUTION INTRAVENOUS at 16:45

## 2018-05-01 RX ADMIN — CEFAZOLIN 2 G: 1 INJECTION, POWDER, FOR SOLUTION INTRAMUSCULAR; INTRAVENOUS at 16:56

## 2018-05-01 RX ADMIN — HYDROMORPHONE HYDROCHLORIDE: 10 INJECTION, SOLUTION INTRAMUSCULAR; INTRAVENOUS; SUBCUTANEOUS at 05:23

## 2018-05-01 RX ADMIN — ONDANSETRON 4 MG: 2 INJECTION INTRAMUSCULAR; INTRAVENOUS at 17:28

## 2018-05-01 RX ADMIN — ASPIRIN 81 MG: 81 TABLET, COATED ORAL at 23:04

## 2018-05-01 RX ADMIN — HEPARIN SODIUM 10000 UNITS: 10000 INJECTION, SOLUTION INTRAVENOUS; SUBCUTANEOUS at 14:55

## 2018-05-01 RX ADMIN — MEPERIDINE HYDROCHLORIDE 12.5 MG: 25 INJECTION INTRAMUSCULAR; INTRAVENOUS; SUBCUTANEOUS at 18:52

## 2018-05-01 RX ADMIN — SUGAMMADEX 200 MG: 100 INJECTION, SOLUTION INTRAVENOUS at 18:14

## 2018-05-01 RX ADMIN — SODIUM CHLORIDE, POTASSIUM CHLORIDE, SODIUM LACTATE AND CALCIUM CHLORIDE: 600; 310; 30; 20 INJECTION, SOLUTION INTRAVENOUS at 19:34

## 2018-05-01 RX ADMIN — DOCUSATE SODIUM 100 MG: 100 CAPSULE, LIQUID FILLED ORAL at 23:05

## 2018-05-01 RX ADMIN — SODIUM CHLORIDE, POTASSIUM CHLORIDE, SODIUM LACTATE AND CALCIUM CHLORIDE: 600; 310; 30; 20 INJECTION, SOLUTION INTRAVENOUS at 17:16

## 2018-05-01 RX ADMIN — HEPARIN SODIUM 2000 UNITS: 200 INJECTION, SOLUTION INTRAVENOUS at 13:51

## 2018-05-01 RX ADMIN — LIDOCAINE HYDROCHLORIDE 100 MG: 20 INJECTION, SOLUTION INFILTRATION; PERINEURAL at 16:48

## 2018-05-01 RX ADMIN — SODIUM CHLORIDE: 9 INJECTION, SOLUTION INTRAVENOUS at 22:30

## 2018-05-01 RX ADMIN — HEPARIN SODIUM 10000 UNITS: 10000 INJECTION, SOLUTION INTRAVENOUS; SUBCUTANEOUS at 13:51

## 2018-05-01 RX ADMIN — LIDOCAINE HYDROCHLORIDE 3 ML: 10 INJECTION, SOLUTION EPIDURAL; INFILTRATION; INTRACAUDAL; PERINEURAL at 14:55

## 2018-05-01 ASSESSMENT — VISUAL ACUITY
OU: NORMAL ACUITY;GLASSES

## 2018-05-01 NOTE — PROGRESS NOTES
HOSPITALIST CONSULT CHART CHECK:  5/1/2018      Hospitalist service was consulted for cross coverage only. We will peripherally follow and chart check throughout the week.        - For vascular medical concerns during business hours M-F, call the [facility] at [phone] to have the rounding/on call Vascular Medicine (NOT VASCULAR SURGERY) MD paged.      - After business hours M-F, for medical concerns on this patient, please page hospitalist staff.      - For vascular surgical questions, please page the appropriate surgeon (primary vascular surgeon or on call vascular surgeon) based upon the time of day.           ELVA Walton, CNP  Hospitalist Service  [facility]  5/1/2018   8:00 AM  [phone]

## 2018-05-01 NOTE — BRIEF OP NOTE
[facility] Brief Operative Note    Pre-operative diagnosis: Femoral artery injury   Post-operative diagnosis Femoral artery injury   Procedure: Procedure(s):  EMERGENCY RIGHT FEMORAL EXPLORATION WITH FEMORAL ARTERY REPAIR.    EBL: 50mL - Wound Class: II-Clean Contaminated   Surgeon(s): Surgeon(s) and Role:     * Shade Owens MD - Primary     * Tanner Galdamez MD - Assisting   Estimated blood loss: 50 mL    Specimens: * No specimens in log *   Findings: Tear in the right femoral artery, repaired primarily. Palpable DP pulses b/l.

## 2018-05-01 NOTE — PROCEDURES
RADIOLOGY PROCEDURE NOTE  Patient name: Angeli Jacobson  MRN: [id number]  : 1950    Pre-procedure diagnosis: occluded left proximal femoral bypass.  Post-procedure diagnosis: Same    Procedure Date/Time: May 1, 2018  3:26 PM  Procedure: left lower extremity angiogram with mechanical thrombectomy of the bypass, peroneal artery and posterior tibial artery. Angioplasty of the bypass and placement of a 6 x 50 mm Viabahn stent graft in the proximal bypass.   Estimated blood loss: None  Specimen(s) collected with description: none  The patient tolerated the procedure well with no immediate complications.  Significant findings:none    See imaging dictation for procedural details.    Provider name: Jenni Delgado  Assistant(s):None

## 2018-05-01 NOTE — ANESTHESIA PREPROCEDURE EVALUATION
Procedure: Procedure(s):  BYPASS GRAFT FEMOROPOPLITEAL  Preop diagnosis: UNKNOWN    Allergies   Allergen Reactions     Celexa [Citalopram Hydrobromide]      Decreased libido     Past Medical History:   Diagnosis Date     * * * SBE PROPHYLAXIS * * * 1998    Amox 500mg, take 4 tabs one hour prior to procedure.Takes this because of lymphedema secondary from leg surgey     Central serous retinopathy 2001    Resolved 9/2001     CHRONIC NECK PAIN 1995     Depressive disorder, not elsewhere classified 2001     MIXED HYPERLIPIDEMIA, LDL GOAL <160 1998    LDL goal < 160     MYXOID LIPOSARCOMA 2000    Left thigh, S/P excision, radiation  at [facility]     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    CHRONIC LEFT THIGH LYMPHEDEMA     Nontoxic multinodular goiter 2005    needs yearly US     Osteoporosis, unspecified 2001     Other lymphedema 2000    left thigh, gets regular PT for this     PAD (peripheral artery disease) (H) 4/20/2018     SHINGLES 2001     Sprain of lumbosacral (joint) (ligament) 1995    right     Unspecified hearing loss 1998    chronic tinnitus     Unspecified tinnitus 1998     Past Surgical History:   Procedure Laterality Date     C APPENDECTOMY      Appendectomy     C NONSPECIFIC PROCEDURE  04/2000    Open Biopsy Left Thigh Liposarcoma     COLONOSCOPY N/A 2/5/2016    Procedure: COMBINED COLONOSCOPY, SINGLE OR MULTIPLE BIOPSY/POLYPECTOMY BY BIOPSY;  Surgeon: Varun Stanley MD, MD;  Location: [facility]     EXCISION MALIG LESION>1.25CM  5/2000    Myxoid Liposarcoma       HC COLONOSCOPY THRU STOMA, DIAGNOSTIC  2006    due 2010     HC COLP CERVIX/UPPER VAGINA  07/1997    Negative     HC DILATION/CURETTAGE DIAG/THER NON OB  02/1997    Post menopausal bleeding on HRT, negative     Social History   Substance Use Topics     Smoking status: Never Smoker     Smokeless tobacco: Never Used     Alcohol use No     Prior to Admission medications    Medication Sig Start Date End Date Taking? Authorizing Provider   alendronate (FOSAMAX) 70  MG tablet Take 1 tablet (70 mg) by mouth with 8oz water every 7 days 30 minutes before breakfast and remain upright during this time. 10/25/17  Yes Sepideh Burris PA-C   aspirin 81 MG tablet Take 1 tablet by mouth daily. 7/26/11  Yes Yanelis Bailey MD   atorvastatin (LIPITOR) 80 MG tablet Take 1 tablet (80 mg) by mouth daily 8/24/17  Yes Sepideh Burris PA-C   buPROPion (WELLBUTRIN XL) 300 MG 24 hr tablet Take 1 tablet (300 mg) by mouth every morning 8/24/17  Yes Sepideh Burris PA-C   estradiol (ESTRACE VAGINAL) 0.1 MG/GM cream Place 2 g vaginally three times a week 1/3/18  Yes Ramez Beal MD   MULTI-VITAMIN OR TABS 1 TABLET DAILY 6/23/09  Yes Calista Chand MD   Omega-3 Fatty Acids (FISH OIL PO) Take  by mouth.   Yes Reported, Patient   ORDER FOR DME Equipment being ordered: lymphedema bandages 10/14/14  Yes Yanelis Bailey MD   order for DME Equipment being ordered: Left Thigh High Compression Stocking, 550 CCL.3 2/8/16  Yes Yanelis Bailey MD     Current Facility-Administered Medications Ordered in Epic   Medication Dose Route Frequency Last Rate Last Dose     0.9% sodium chloride TABLE SOLN  1,000 mL TABLE SOLN Continuous PRN         alteplase (ACTIVASE)  Line 1 (Arterial Infusion Catheter) 5 mg/500 mL infusion  0.5 mg/hr INTRA-ARTERIAL Continuous   Stopped at 05/01/18 0130     aspirin EC EC tablet 81 mg  81 mg Oral Daily         buPROPion (WELLBUTRIN XL) 24 hr tablet 300 mg  300 mg Oral QAM         docusate sodium (COLACE) capsule 100 mg  100 mg Oral BID         fentaNYL (PF) (SUBLIMAZE) injection 25-50 mcg  25-50 mcg Intravenous Q5 Min PRN   50 mcg at 05/01/18 1455     flumazenil (ROMAZICON) injection 0.2 mg  0.2 mg Intravenous q1 min prn         heparin 10,000 units in 1000 mL NS  1,000 mL TABLE SOLN Continuous PRN   10,000 Units at 05/01/18 1455     heparin Line 1 (Arterial Sheath) 25,000 units in 0.45% NaCl 250 mL  500 Units/hr INTRA-ARTERIAL Continuous   Stopped at 05/01/18  0130     HYDROmorphone (DILAUDID) PCA 1 mg/mL OPIOID NAIVE   Intravenous Continuous         metoclopramide (REGLAN) tablet 5 mg  5 mg Oral Q6H PRN        Or     metoclopramide (REGLAN) injection 5 mg  5 mg Intravenous Q6H PRN         midazolam (VERSED) injection 0.5-1 mg  0.5-1 mg Intravenous Q4 Min PRN   1 mg at 05/01/18 1455     naloxone (NARCAN) injection 0.1-0.4 mg  0.1-0.4 mg Intravenous Q2 Min PRN         naloxone (NARCAN) injection 0.1-0.4 mg  0.1-0.4 mg Intravenous Q2 Min PRN         ondansetron (ZOFRAN-ODT) ODT tab 4 mg  4 mg Oral Q6H PRN        Or     ondansetron (ZOFRAN) injection 4 mg  4 mg Intravenous Q6H PRN   4 mg at 04/30/18 1210     prochlorperazine (COMPAZINE) injection 5 mg  5 mg Intravenous Q6H PRN   5 mg at 05/01/18 0520    Or     prochlorperazine (COMPAZINE) tablet 5 mg  5 mg Oral Q6H PRN        Or     prochlorperazine (COMPAZINE) Suppository 12.5 mg  12.5 mg Rectal Q12H PRN         rosuvastatin (CRESTOR) tablet 40 mg  40 mg Oral At Bedtime         sodium chloride 0.9% infusion   Intravenous Continuous 75 mL/hr at 04/30/18 2013       No current Epic-ordered outpatient prescriptions on file.       sodium chloride 0.9 %       alteplase (ACTIVASE) 5 mg/500 mL infusion (LINE 1) Stopped (05/01/18 0130)     heparin 10,000 units in 1000 mL NS       heparin Stopped (05/01/18 0130)     HYDROmorphone       sodium chloride 75 mL/hr at 04/30/18 2013     Wt Readings from Last 1 Encounters:   04/30/18 68.5 kg (151 lb)     Temp Readings from Last 1 Encounters:   05/01/18 36.4  C (97.6  F) (Oral)     BP Readings from Last 6 Encounters:   05/01/18 143/79   04/26/18 119/75   04/24/18 131/73   04/13/18 136/79   04/06/18 118/64   10/25/17 127/70     Pulse Readings from Last 4 Encounters:   04/30/18 77   04/26/18 92   04/24/18 80   04/13/18 96     Resp Readings from Last 1 Encounters:   05/01/18 13     SpO2 Readings from Last 1 Encounters:   05/01/18 100%     Recent Labs   Lab Test  05/01/18   0545  04/30/18   0740   08/24/17   0929   NA  140   --   141   POTASSIUM  3.8   --   4.1   CHLORIDE  107   --   106   CO2  22   --   27   ANIONGAP  11   --   8   GLC  102*   --   86   BUN  12   --   15   CR  0.56  0.71  0.81   LONG  7.6*   --   9.3     Recent Labs   Lab Test  08/24/17   0929  05/29/15   1125   AST  27  19   ALT  30  30   ALKPHOS  88  87   BILITOTAL  0.5  0.4     Recent Labs   Lab Test  05/01/18   0545  04/30/18   0740   WBC  9.8  5.7   HGB  12.3  13.7   PLT  152  236     No results for input(s): ABO, RH in the last 90232 hours.  Recent Labs   Lab Test  04/30/18   0740   INR  0.96   PTT  26      No results for input(s): TROPI in the last 13550 hours.  No results for input(s): PH, PCO2, PO2, HCO3 in the last 36948 hours.  No results for input(s): HCG in the last 75878 hours.  Recent Results (from the past 744 hour(s))   US ADRIAN Doppler with Exercise    Narrative    US ADRIAN DOPPLER WITH EXERCISE BILATERAL   4/20/2018 1:55 PM     HISTORY: Myxoid liposarcoma (H). Lymphedema of left leg. PAD  (peripheral artery disease) (H).    COMPARISON: None    FINDINGS: On the right, there are triphasic waveforms and ankle  brachial index of 1.21. On the left, there are biphasic/monophasic  waveforms with an ankle-brachial index of 0.59. Patient walked on a  treadmill for 5 minutes at 1.5 miles per hour and a 10% grade.  Following exercise, ankle-brachial index is 1.21 on the right and 0.39  on the left.      Impression    IMPRESSION: Moderate arterial insufficiency on the left becomes severe  following exercise. There are normal ABIs on the right.    CLAY NEELY MD   US Lower Extremity Venous Mapping Bilateral    Narrative    ULTRASOUND VENOUS MAPPING BOTH LEGS  4/27/2018 11:31 AM     HISTORY:  History of left femoral artery [place] graft for sarcoma  resection in 2000 with possible occlusion. Patient has left leg  arteriogram scheduled for 4/30/18 with Dr. Elijah Owens.  Atherosclerosis  of native arteries of extremity with intermittent  claudication (H).    COMPARISON: None.      Impression    IMPRESSION:   1. Right great saphenous vein in the thigh ranges from 2.1 mm to 4.4  mm.  2. Right great saphenous vein leaves the fascia at the level of the  knee and was not visualized past the proximal calf.  3. Left great saphenous vein in the thigh ranges from 4.3 mm to 5.5 mm  multiple sidebranches are noted in the upper thigh.  4. Left great saphenous vein at and below the knee ranges from 2.4 mm  to 5.8 mm. The great saphenous vein leaves the fascia just below the  level of the knee. There is a side branch at the level of the mid  calf.    GEORGE HOLLEY DO   IR Lower Extremity Angiogram Left    Addendum: 5/1/2018    ADDENDUM:     IMPRESSION SHOULD READ:     IMPRESSION:  1. No hemodynamically significant inflow disease. Widely patent left  common femoral and profunda femoral arteries. Extensive profunda  collaterals reconstitute the native superficial femoral artery in the  mid thigh. Widely patent popliteal and left-sided tibial vessels to  the foot.  2. Successful crossing of the thrombosed proximal left superficial  femoral artery 6 mm PTFE interposition graft with placement of a 20 cm  infusion catheter and initiation of TPA thrombolysis. Plan for lytic  recheck of the left leg tomorrow.    JACKSON CEDILLO MD      Narrative    INTERVENTIONAL RADIOLOGY LEFT LOWER EXTREMITY ANGIOGRAM LEFT   4/30/2018  10:13 AM    PREOPERATIVE DIAGNOSIS: Status post grafting of proximal left  superficial femoral artery utilizing a 6 mm PTFE graft in the remote  past for sarcoma excision now with thrombosed graft and short distance  left leg claudication.    POSTOPERATIVE DIAGNOSIS: Status post grafting of proximal left  superficial femoral artery utilizing a 6 mm PTFE graft in the remote  past for sarcoma excision now with thrombosed graft and short distance  left leg claudication.    PROCEDURE PERFORMED:  1. Pelvic arteriogram.  2. Selective left lower extremity  arteriogram.  3. Placement of infusion catheter in thrombosed left superficial  femoral artery graft with initiation of thrombolysis    SURGEON: Shade Owens MD    OPERATIVE INDICATIONS: This patient is a 68-year-old female with  hyperlipidemia who had resection of a proximal left thigh sarcoma in  2000. At that procedure she had resection of her left femoral vein and  her proximal left superficial femoral artery. The superficial femoral  artery was reconstructed in an end-to-end manner with a 6 mm PTFE  interposition graft. 2-1/2 months ago she fell landing on both knees.  Ever since then she has had left leg weakness and less than 2 block  claudication. She has easily palpable right-sided pedal pulses. She  has biphasic left-sided pedal signals. Her resting left-sided ADRIAN is  0.6 decreasing to 0.3 after 5 minutes.    DESCRIPTION OF TECHNIQUE: After informed consent was obtained the  patient was placed on the imaging table and her bilateral groins were  prepped and draped in the usual sterile fashion. Conscious sedation  was achieved utilizing 2 mg IV Versed and 100 mcg IV fentanyl. Bharti Bower RN monitored the patient throughout this procedure under my  supervision. Total sedation time was 43 minutes. 8.5 minutes of  fluoroscopy time and 36 mGy total fluoroscopy dose were required. Skin  directly over the right common femoral artery was locally anesthetized  with 10mL of 1% lidocaine. The right common femoral artery was  percutaneously accessed using an 18-gauge needle and single wall  puncture technique. A T. Doc wire was advanced up into the aorta. An  Omni Flush catheter was advanced into the distal aorta. A pelvic  arteriogram was performed. An angled glide wire glide catheter  combination was directed into the distal left external iliac artery.  Selective left lower extremity angiography was performed via  injections through this catheter. This clearly showed the occluded  PTFE graft which originated  in the proximal left superficial femoral  artery and extended to the level of mid thigh. I chose to _probe_ this  thrombosed graft with an angled glide wire glide catheter combination.  With little difficulty I was able to pass this wire catheter  combination through the thrombosed graft and into the native distal  left SFA. This was verified under fluoroscopy. Wire exchange was made  for a Solorzano wire. Sheath exchange was made for a 6 [place] crossover  sheath. A 20 cm infusion catheter was appropriately positioned within  the thrombosed PTFE graft. TPA thrombolysis was then initiated at 0.5  mg per hour. Heparin is being run through the sheath at 500 units per  hour. The patient tolerated the procedure without incident. A Benitez  catheter was placed. She was admitted to the floor with plans for a  lytic recheck tomorrow morning.    FINDINGS:    Pelvic arteriogram: The visualized aorta and bilateral iliac arteries  are widely patent.    Selective left lower extremity arteriogram: The left common femoral  and profunda femoral arteries are widely patent. There are extensive  profunda collaterals. The left superficial femoral artery PTFE graft  likely originates within 2 cm of the origin of that vessel. Multiple  surgical clips are noted consistent with the prior sarcoma resection.  There is reconstitution of the native superficial femoral artery at  mid thigh. The popliteal artery and all 3 tibial vessels are widely  patent to the level visualized in the distal calf.      Impression    IMPRESSION:  1. No hemodynamically significant inflow disease. Widely patent left  common femoral and profunda femoral arteries. Extensive profunda  collaterals reconstitute the native superficial femoral artery in the  mid sarcoma excision sarcoma excision thigh. Widely patent popliteal  and left-sided tibial vessels to the foot.  3. Successful crossing of the thrombosed proximal left superficial  femoral artery 6 mm PTFE interposition  graft with placement of a 20 cm  infusion catheter and initiation of TPA thrombolysis. Plan for lytic  recheck of the left leg tomorrow.    JACKSON CEDILLO MD       RECENT LABS:   ECG:   ECHO:       Anesthesia Evaluation     .             ROS/MED HX    ENT/Pulmonary:  - neg pulmonary ROS     Neurologic:       Cardiovascular:     (+) Dyslipidemia, -Peripheral Vascular Disease---. : . . . :. .       METS/Exercise Tolerance:  >4 METS   Hematologic:         Musculoskeletal:   (+) arthritis, , , other musculoskeletal- osteoporosis       GI/Hepatic:         Renal/Genitourinary:         Endo:     (+) thyroid problem  Thyroid disease - Other, .      Psychiatric:     (+) psychiatric history depression      Infectious Disease:         Malignancy:         Other:                     Physical Exam  Normal systems: dental    Airway   Mallampati: II  TM distance: >3 FB  Neck ROM: full    Dental     Cardiovascular   Rhythm and rate: regular and normal      Pulmonary    breath sounds clear to auscultation                    Anesthesia Plan      History & Physical Review  History and physical reviewed and following examination; no interval change.    ASA Status:  4 emergent.        Plan for General and ETT with Intravenous and Propofol induction. Maintenance will be Balanced.    PONV prophylaxis:  Ondansetron (or other 5HT-3)  Additional equipment: Videolaryngoscope and 2nd IV Patient transferred directly to OR from IR for right femoral artery injury/bleed      Postoperative Care  Postoperative pain management:  IV analgesics.      Consents  Anesthetic plan, risks, benefits and alternatives discussed with:  Patient..                          .

## 2018-05-01 NOTE — ANESTHESIA CARE TRANSFER NOTE
Patient: Angeli Jacobson    Procedure(s):  EMERGENCY RIGHT FEMORAL EXPLORATION WITH FEMORAL ARTERY REPAIR.    EBL: 50mL - Wound Class: II-Clean Contaminated    Diagnosis: UNKNOWN  Diagnosis Additional Information: No value filed.    Anesthesia Type:   General, ETT     Note:  Airway :Face Mask  Patient transferred to:PACU  Comments: Neuromuscular blockade reversed after TOF 4/4, spontaneous respirations, adequate tidal volumes, followed commands to voice, oropharynx suctioned with soft flexible catheter, extubated atraumatically, extubated with suction, airway patent after extubation.  Oxygen via facemask at 10 liters per minute to PACU. Oxygen tubing connected to wall O2 in PACU, SpO2, NiBP, and EKG monitors and alarms on and functioning, Mary Carmen Hugger warmer connected to patient gown, report on patient's clinical status given to PACU RN, RN questions answered. Handoff Report: Identifed the Patient, Identified the Reponsible Provider, Reviewed the pertinent medical history, Discussed the surgical course, Reviewed Intra-OP anesthesia mangement and issues during anesthesia, Set expectations for post-procedure period and Allowed opportunity for questions and acknowledgement of understanding      Vitals: (Last set prior to Anesthesia Care Transfer)    CRNA VITALS  5/1/2018 1749 - 5/1/2018 1829      5/1/2018             NIBP: 171/83    Pulse: 66    NIBP Mean: 112    SpO2: 100 %    Resp Rate (set): 10                Electronically Signed By: ELVA Crow CRNA  May 1, 2018  6:29 PM

## 2018-05-01 NOTE — PLAN OF CARE
Problem: Patient Care Overview  Goal: Plan of Care/Patient Progress Review  Outcome: Improving  VSS. A/O. Bedrest, log rolled. TPA/Hep gtt R groin intact, no hematoma, dressing marked, no changes. Denies pain. PCA not used. R palpable, L doppler. Vomited twice after fluid intake. Denies nausea. Benitez good UOP. Neuros intact. Tele SR

## 2018-05-01 NOTE — PLAN OF CARE
Problem: Patient Care Overview  Goal: Plan of Care/Patient Progress Review  Outcome: Improving  VSS, TPA stopped @ 0115 last night d/t Rt arterial groin site hematoma, held pressure for 45 min. Started bleeding again 2 hours later, pressure held for more than an hour. 2 hours later, she began to bleed again, pressure is currently being held and TPA/heparin remains off. Pain well controlled w/ PCA dilaudid, adequate UOP in [place]

## 2018-05-01 NOTE — PROGRESS NOTES
"[facility]    Vascular Medicine Progress Note    Date of Service (when I saw the patient): 05/01/2018          Physician Supervisory Attestation:   I have reviewed and discussed with the physician assistant their history, physical and plan and independently interviewed and examined Angeli Jacobson and agree with the plan as stated in the physician assistant note.    Lysis was stopped last night due to hematoma, she underwent lysis recheck today per IR , \"left lower extremity angiogram with mechanical thrombectomy of the bypass, peroneal artery and posterior tibial artery. Angioplasty of the bypass and placement of a 6 x 50 mm Viabahn stent graft in the proximal bypass.\"    follow post angio protocol, monitor access groin site. IVF , CMS etc.  Statin switched from Atorva to crestor 40 mg this admission and consider zetia in the future.  Will discuss with Dr. Owens regarding maximizing antiplatelet vs anticoagulation?      Patient care time spent 35 minutes today.    Tiesha Prince MD,[facility]  Vascular Medicine   5/1/2018       Assessment & Plan   1. Left SFA PTFE interposition graft occlusion with initiation of catheter-directed lysis 4/30/18     Assessment:   -Severe hyperlipidemia as well as HRT contributed.   -TPA stopped overnight due to hematoma at sheath site.   -Palpable right pedal pulses and audible left pedal pulses by doppler.     Plan:   -Await lysis recheck today.  -If unable to open graft, may require surgical repair/bypass  -Dr. Owens of Vascular Surgery following.      2. Atrophic vaginitis     Assessment: Asymptomatic. Just started on Estrace by Urology in 5/2017.  Plan: Discontinue Estrace given her graft occlusion     3. Hyperlipidemia     Assessment: LDL of 173 while on Atorvastatin 80 mg daily.  She relays a family history of CAD with her father and grandfather passing from an MI and her cousin undergoing triple bypass surgery at age 36. Most of her family has had " trouble with hyperlipidemia, including 2 of her 3 sons.     Plan: Change to Crestor 40 mg daily. Repeat lipid panel in 3 months. If LDL still above 70, add Zetia 10 mg daily.      4. Depression     Assessment: This has been stable.   Plan: Continue bupropion.     Interval History   Hematoma right groin last night. Not much sleep. Nausea better. Pain controlled. Bleeding from right groin sheath site.     Physical Exam   Temp: 97.6  F (36.4  C) Temp src: Oral BP: 108/60   Heart Rate: 85 Resp: 10 SpO2: 98 % O2 Device: None (Room air)    Vitals:    04/30/18 0719   Weight: 68.5 kg (151 lb)     Vital Signs with Ranges  Temp:  [97.4  F (36.3  C)-97.8  F (36.6  C)] 97.6  F (36.4  C)  Heart Rate:  [60-85] 85  Resp:  [8-16] 10  BP: (106-132)/(60-79) 108/60  SpO2:  [92 %-98 %] 98 %  I/O last 3 completed shifts:  In: 2437.07 [P.O.:270; I.V.:2167.07]  Out: 1875 [Urine:1675; Emesis/NG output:200]    Constitutional: Awake, alert, cooperative, no apparent distress, and appears stated age.  Eyes: Lids and lashes normal, sclera clear, conjunctiva normal.  ENT: Normocephalic, without obvious abnormality, atraumatic, oral pharynx with moist mucus membranes  Respiratory: No increased work of breathing, good air exchange, clear to auscultation bilaterally, no crackles or wheezing.  Cardiovascular: Regular rate and rhythm, normal S1 and S2, no S3 or S4, and no murmur noted.  GI: Normal bowel sounds, soft, non-distended, non-tender  Skin: No bruising or bleeding, normal skin color, texture, turgor, no redness, warmth, or swelling, no rashes. Right groin site very small hematoma. Bleeding from right groin sheath site.  Musculoskeletal: There is no redness, warmth, or swelling of the joints.    Neurologic: Awake, alert, oriented to name, place and time.  Cranial nerves II-XII are grossly intact.    Neuropsychiatric: Calm, normal eye contact, alert, normal affect, oriented to self, place, time and situation, memory for past and recent events  intact and thought process normal.  Pulses: Right pedal pulses palpable, left pedal pulses monophasic by doppler.     Medications     alteplase (ACTIVASE) 5 mg/500 mL infusion (LINE 1) Stopped (05/01/18 0130)     heparin Stopped (05/01/18 0130)     HYDROmorphone       sodium chloride 75 mL/hr at 04/30/18 2013       aspirin EC  81 mg Oral Daily     buPROPion  300 mg Oral QAM     docusate sodium  100 mg Oral BID     rosuvastatin  40 mg Oral At Bedtime       Data     Recent Labs  Lab 05/01/18  0545 04/30/18  0740   WBC 9.8 5.7   HGB 12.3 13.7   MCV 91 91    236   INR  --  0.96     --    POTASSIUM 3.8  --    CHLORIDE 107  --    CO2 22  --    BUN 12  --    CR 0.56 0.71   ANIONGAP 11  --    LONG 7.6*  --    *  --      No results found for this or any previous visit (from the past 24 hour(s)).

## 2018-05-01 NOTE — PROGRESS NOTES
Events noted.  TPA stopped @0145 secondary to developing hematoma at right femoral access.  She states that the sandbag on right groin is uncomfortable, but otherwise she's doing well.  AVSS  Right groin with saturated dressing but hematoma is not very impressive.  Left pedals dopplerable  Hgb 12.3 (13.7 yest)   Plts 152    Plan recheck of left leg today per Dr Delgado.  May still be able to stent open the 6mm left SFA PTFE graft.    Elijah Owens MD

## 2018-05-01 NOTE — PROVIDER NOTIFICATION
Paged Dr. Liu of Vascular surgery about new Hematoma in Rt groin around Arterial TPA line.    Order to keep holding pressure and to stop Alteplase and Heparin infusions.

## 2018-05-01 NOTE — PROGRESS NOTES
A&O. Neuros intact. LLE 40cm. Pulses intact. Hematoma to R groin with small/mod amount of drainage, ecchymotic, pressure applied. Benitez. Transferred to IR at 1320

## 2018-05-01 NOTE — PROVIDER NOTIFICATION
Notified AURELIO yang about new Hematoma in Rt groin around Arterial TPA line. Was informed that this TPA was placed in OR by Dr. Owens.

## 2018-05-02 LAB
ANION GAP SERPL CALCULATED.3IONS-SCNC: 6 MMOL/L (ref 3–14)
BUN SERPL-MCNC: 11 MG/DL (ref 7–30)
CALCIUM SERPL-MCNC: 7.3 MG/DL (ref 8.5–10.1)
CHLORIDE SERPL-SCNC: 109 MMOL/L (ref 94–109)
CO2 SERPL-SCNC: 24 MMOL/L (ref 20–32)
CREAT SERPL-MCNC: 0.59 MG/DL (ref 0.52–1.04)
GFR SERPL CREATININE-BSD FRML MDRD: >90 ML/MIN/1.7M2
GLUCOSE SERPL-MCNC: 84 MG/DL (ref 70–99)
HGB BLD-MCNC: 8.5 G/DL (ref 11.7–15.7)
LMWH PPP CHRO-ACNC: 0.21 IU/ML
LMWH PPP CHRO-ACNC: 0.31 IU/ML
POTASSIUM SERPL-SCNC: 3.5 MMOL/L (ref 3.4–5.3)
SODIUM SERPL-SCNC: 139 MMOL/L (ref 133–144)

## 2018-05-02 PROCEDURE — 99233 SBSQ HOSP IP/OBS HIGH 50: CPT | Performed by: INTERNAL MEDICINE

## 2018-05-02 PROCEDURE — 25000132 ZZH RX MED GY IP 250 OP 250 PS 637: Mod: GY | Performed by: PHYSICIAN ASSISTANT

## 2018-05-02 PROCEDURE — 85520 HEPARIN ASSAY: CPT | Performed by: SURGERY

## 2018-05-02 PROCEDURE — A9270 NON-COVERED ITEM OR SERVICE: HCPCS | Mod: GY | Performed by: SURGERY

## 2018-05-02 PROCEDURE — 25000132 ZZH RX MED GY IP 250 OP 250 PS 637: Mod: GY | Performed by: RADIOLOGY

## 2018-05-02 PROCEDURE — 85018 HEMOGLOBIN: CPT | Performed by: SURGERY

## 2018-05-02 PROCEDURE — 12000007 ZZH R&B INTERMEDIATE

## 2018-05-02 PROCEDURE — 25000132 ZZH RX MED GY IP 250 OP 250 PS 637: Mod: GY | Performed by: SURGERY

## 2018-05-02 PROCEDURE — 36415 COLL VENOUS BLD VENIPUNCTURE: CPT | Performed by: SURGERY

## 2018-05-02 PROCEDURE — 80048 BASIC METABOLIC PNL TOTAL CA: CPT | Performed by: SURGERY

## 2018-05-02 PROCEDURE — A9270 NON-COVERED ITEM OR SERVICE: HCPCS | Mod: GY | Performed by: RADIOLOGY

## 2018-05-02 PROCEDURE — A9270 NON-COVERED ITEM OR SERVICE: HCPCS | Mod: GY | Performed by: PHYSICIAN ASSISTANT

## 2018-05-02 RX ORDER — ACETAMINOPHEN 500 MG
500 TABLET ORAL EVERY 6 HOURS PRN
Status: DISCONTINUED | OUTPATIENT
Start: 2018-05-02 | End: 2018-05-03 | Stop reason: HOSPADM

## 2018-05-02 RX ORDER — OXYCODONE HYDROCHLORIDE 5 MG/1
5-10 TABLET ORAL EVERY 4 HOURS PRN
Status: DISCONTINUED | OUTPATIENT
Start: 2018-05-02 | End: 2018-05-03 | Stop reason: HOSPADM

## 2018-05-02 RX ORDER — HYDROMORPHONE HYDROCHLORIDE 1 MG/ML
0.2 INJECTION, SOLUTION INTRAMUSCULAR; INTRAVENOUS; SUBCUTANEOUS
Status: DISCONTINUED | OUTPATIENT
Start: 2018-05-02 | End: 2018-05-03 | Stop reason: HOSPADM

## 2018-05-02 RX ADMIN — OXYCODONE HYDROCHLORIDE 10 MG: 5 TABLET ORAL at 15:09

## 2018-05-02 RX ADMIN — OXYCODONE HYDROCHLORIDE 10 MG: 5 TABLET ORAL at 19:51

## 2018-05-02 RX ADMIN — ASPIRIN 81 MG: 81 TABLET, COATED ORAL at 10:31

## 2018-05-02 RX ADMIN — BUPROPION HYDROCHLORIDE 300 MG: 300 TABLET, FILM COATED, EXTENDED RELEASE ORAL at 10:31

## 2018-05-02 RX ADMIN — DOCUSATE SODIUM 100 MG: 100 CAPSULE, LIQUID FILLED ORAL at 10:31

## 2018-05-02 RX ADMIN — ROSUVASTATIN CALCIUM 40 MG: 20 TABLET, FILM COATED ORAL at 21:19

## 2018-05-02 RX ADMIN — DOCUSATE SODIUM 100 MG: 100 CAPSULE, LIQUID FILLED ORAL at 21:20

## 2018-05-02 RX ADMIN — OXYCODONE HYDROCHLORIDE 5 MG: 5 TABLET ORAL at 10:31

## 2018-05-02 RX ADMIN — ACETAMINOPHEN 500 MG: 500 TABLET, FILM COATED ORAL at 19:51

## 2018-05-02 ASSESSMENT — VISUAL ACUITY
OU: NORMAL ACUITY;GLASSES

## 2018-05-02 NOTE — PHARMACY
Medication coverage check for Xarelto and Eliquis. Both medications covered at about $105, but Liaison will perform copay exception (for both) to get the copay lowered to $5 or less.    Tresa Victor CphT  [facility] Discharge Pharmacy Liaison  [employer] Cell: [phone]

## 2018-05-02 NOTE — ANESTHESIA POSTPROCEDURE EVALUATION
Patient: Angeli Jacobson    Procedure(s):  EMERGENCY RIGHT FEMORAL EXPLORATION WITH FEMORAL ARTERY REPAIR.    EBL: 50mL - Wound Class: II-Clean Contaminated    Diagnosis:UNKNOWN  Diagnosis Additional Information: No value filed.    Anesthesia Type:  General, ETT    Note:  Anesthesia Post Evaluation    Patient location during evaluation: PACU  Patient participation: Able to fully participate in evaluation  Level of consciousness: awake  Pain management: adequate  Airway patency: patent  Cardiovascular status: acceptable  Respiratory status: acceptable  Hydration status: acceptable  PONV: none     Anesthetic complications: None          Last vitals:  Vitals:    05/01/18 2230 05/01/18 2300 05/01/18 2330   BP: 137/73 131/82 146/75   Pulse:      Resp: 12 16 10   Temp:      SpO2: 97% 97% 95%         Electronically Signed By: Gio Rodriguez MD  May 1, 2018  11:50 PM

## 2018-05-02 NOTE — PROGRESS NOTES
"Vasular Surgery Progress Note         Assessment and Plan:   Assessment:   Post-operative day #1  Procedure(s) with comments:  EXPLORE GROIN (Right) - EMERGENCY RIGHT FEMORAL EXPLORATION WITH FEMORAL ARTERY REPAIR.    EBL: 50mL    Patient is a 67 yo F who underwent a L leg angiogram on 4/30 for occluded SFA graft, with successful recanalization. Post op course complicated by continued bleeding at the sheath site, requiring a R groin exploration and femoral artery repair.     Post op doing well, pain controlled and no complaints of pain. Palpable pulses b/l, urine now clear.     Plan:   - ok for up with assist  - continue heparin gtt, will need to transition to oral anticoagulant  - regular diet  - d/c IVF, thompson in place, will continue at this time with BMP pending.   - d/c PCA, IV and po pain meds PRN    Vascular medicine continuing to follow, appreciate their assistance.             Interval History:   Patient is doing well with pain well controlled, just some reported mild soreness at R groin site. Has tolerated clears overnight with plans for regular diet this morning. No nausea or emesis. Is looking forward to getting up and start moving around.          Physical Exam:     Gen:  This is a well developed, well nourished female in no apparent distress.  Blood pressure 121/56, pulse 77, temperature 97.4  F (36.3  C), temperature source Temporal, resp. rate 15, height 1.676 m (5' 6\"), weight 68.5 kg (151 lb), last menstrual period 03/18/2005, SpO2 95 %, not currently breastfeeding.  Neck -   Lungs - clear to ascultation.    Heart - Regular rate & rhythm without murmur  Abdomen: soft, NT, ND.  Extremities - R groin incision c/d/i with surrounding hematoma stable. Palpable DP/PT pulses b/l. Edema LLE stable.   Neurologic - nonfocal          Data:     WBC   Date Value Ref Range Status   05/01/2018 10.2 4.0 - 11.0 10e9/L Final   ]       Mercedes Haro MD  [phone]     I agree with the above.  Palp DPs bilaterally. "  Right groin clear.  D/C thompson  I favor empiric anticoagulation to increase patency of left leg PTFE graft - discussed with Dr Prince.  Possible home tomorrow.    Elijah Owens MD

## 2018-05-02 NOTE — PLAN OF CARE
Problem: Patient Care Overview  Goal: Discharge Needs Assessment  Outcome: No Change  Alert and oriented. VSS on room air. Reports mild pain, managed with PCA dilaudid. Neuros intact. Pulses palpable. CMS intact. Hematoma to right groin. Dressing with small amount of drainage, unchanged and marked. Heparin gtt at 750 units/hr. Benitez patent with blood colored urine output. Tolerating clear liquid diet. Bedrest continued.

## 2018-05-02 NOTE — PROGRESS NOTES
HOSPITALIST CONSULT CHART CHECK:  5/1/2018      Hospitalist service was consulted for cross coverage only. We will peripherally follow and chart check throughout the week.        - For vascular medical concerns during business hours M-F, call the [facility] at [phone] to have the rounding/on call Vascular Medicine (NOT VASCULAR SURGERY) MD paged.      - After business hours M-F, for medical concerns on this patient, please page hospitalist staff.      - For vascular surgical questions, please page the appropriate surgeon (primary vascular surgeon or on call vascular surgeon) based upon the time of day.      Alonzo Méndez PA-C   Hospitalist

## 2018-05-02 NOTE — OP NOTE
Procedure Date: 05/01/2018      DATE OF PROCEDURE: 05/01/2018      PREOPERATIVE DIAGNOSIS:  Status post left leg angiogram with hemorrhage from right common femoral artery access site.      POSTOPERATIVE DIAGNOSIS:  Status post left leg angiogram with hemorrhage from right common femoral artery access site.      PROCEDURE:  Exploration of right femoral artery with right common femoral artery primary repair.      SURGEON:  Shade Owens MD      ASSISTANT:  Tanner Galdamez MD; Mercedes Haro MD ([facility] Surgery Resident)      ANESTHESIA:  General endotracheal anesthesia.      ESTIMATED BLOOD LOSS:  50 mL.      OPERATIVE INDICATIONS:  This patient is a 68-year-old female who was admitted yesterday for a left leg angiogram.  She had an occluded left superficial femoral artery graft and a crossover sheath was placed as well as an infusion catheter into the thrombosed graft and initiation of thrombolysis.  Earlier today, she underwent successful recanalization of her left leg bypass graft.  At the completion of that procedure, the 6-Maldivian sheath was removed from the right common femoral artery and compression was held over the area for over 45 minutes.  There was still some oozing coming from the area and I opted to proceed with primary repair of the artery.      OPERATIVE FINDINGS:  By the time the patient reached the operating room, the active bleeding had stopped.  We performed a limited cutdown over the artery and dislodged a small hematoma.  This was controlled with direct finger compression and proximal and distal control was achieved.  The femoral stick was anterior in the mid right common femoral artery.  The arteriotomy was roughly 4 or 5 mm in length.  It was closed in a transverse manner uneventfully with 5-0 Prolene suture.  At the completion of the procedure, the patient had palpable dorsalis pedis pulses bilaterally.      DESCRIPTION OF PROCEDURE:  The patient was brought to the operating room and  placed on the table in a supine position, after which general endotracheal anesthesia was achieved without incident.  As indicated, she had stopped bleeding by the time she got to the operating room, but I still chose to proceed.  Her right groin was prepped and draped in the usual sterile fashion.  A limited vertical incision was made and dissection proceeded sharply downward to the level of the inguinal ligament.  As I began dissecting out the common femoral artery, she started to bleed once again from the access site.  This was controlled with direct finger compression.  Proximal control just above the stick site was achieved with a vessel loop.  We dissected out the distal common femoral artery and this was also encircled with a red vessel loop.  The arteriotomy was in a good location anterolaterally in the mid common femoral artery.  It appeared that the opening had enlarged due to movement of the sheath such that it was roughly 4 or 5 mm in diameter.  Heparinized saline was instilled proximally and distally in the artery and I did not systemically heparinize her.  I utilized a Goel scissors to extend the arteriotomy a bit more so that I could see well within the lumen of the vessel.  This arteriotomy was closed in a transverse manner with running 5-0 Prolene suture.  Prior to securing the suture line, our vessel loops were briefly released to flush any debris out of the arterial lumen.  The suture line was subsequently secured and observed to be hemostatic as flow was restored back down the right leg.  The wound was then infiltrated with 30 mL of 0.5% Marcaine with epinephrine.  Surgicel gauze and gentle compression was held over the area for about 10 minutes.  We had excellent hemostasis.  There really was no hematoma that could be evacuated as it had dissected into the subcutaneous tissue.  Closure then proceeded utilizing interrupted 2-0 Vicryl to reapproximate the femoral sheath.  Subcutaneous tissue was  closed with interrupted absorbable suture and skin was closed with interrupted 3-0 nylon vertical mattress sutures.  A sterile dressing was applied.  Final sponge and needle count were reported as correct.  The patient tolerated the procedure without incident.  She was returned extubated and hemodynamically stable to the recovery room.        In the recovery room, she is again observed to have a 2+ palpable right-sided dorsalis pedis pulse and a 1+ palpable left-sided dorsalis pedis pulse.  We will start her on a heparin drip tonight given the intervention performed earlier this afternoon on a previously thrombosed left superficial femoral artery PTFE interposition graft.         JACKSON CEDILLO MD             D: 2018   T: 2018   MT:       Name:     DIANA ELAINE   MRN:      [id number]        Account:        [id number]   :      1950           Procedure Date: 2018      Document: [id number]       cc: Surgical - [facility]

## 2018-05-02 NOTE — PROGRESS NOTES
[facility]    Vascular Medicine Progress Note    Date of Service (when I saw the patient): 05/02/2018          Physician Supervisory Attestation:   I have reviewed and discussed with the physician assistant their history, physical and plan and independently interviewed and examined Angeli Jacobson and agree with the plan as stated in the physician assistant note.    She is POD 1 for Rt femoral exploration and Rt FA repair yesterday, doing well. On IV heparin.  She has PTFE graft left LE underwent lysis, possis, distal embolization etc.   She will benefit with anticoagulation. I had a lengthy discussion with the patient this am explained options of oral anticoagulants, pros and cons , cost etc. She prefers once a day DOAC.  Pharmacy liaison reviewed coverage.    She has Xanthelasma with high LDL     Plan:  Tomorrow am start Xarelto 15 bid 21 days, then 20 daily with supper. Discussed with pharmacist she will coordinate stopping heparin and starting xarelto  Continue Crestor and add low dose Zetia 5 mg daily  Baby aspirin  D/c estrogen supplements    Patient care time spent 35 minutes, discussed with Dr. Roman Prince MD ,[facility]  Vascular Medicine  5/2/2018       Assessment & Plan   1. Left SFA PTFE interposition graft occlusion with initiation of catheter-directed lysis 4/30/18 followed by mechanical thrombectomy with possis, angioplasty, and placement of Viabahn stent in the proximal bypass graft as well as well as primary repair in the OR of right femoral artery 5/1/18     Assessment:   -Severe hyperlipidemia as well as HRT contributed.   -TPA stopped. Underwent possis of left bypass graft, resulting in embolization to peroneal artery. Still with 2 vessel run-off to left foot and palpable left DP pulse. Brought to OR last night for difficult to control bleeding right femoral artery.   -Palpable DP pulses bilaterally    Plan:   -Continue IV heparin  -As non-occlusive  thrombus remains in left peroneal artery and likely still with some irregularity of graft, it is felt best if she discharge on anticoagulation, at least for a short-term.   -Discussed coumadin vs DOAC with her. She elects to be discharged on Xarelto. Heparin will be stopped and Xarelto will be started tomorrow morning.   -Dr. Owens of Vascular Surgery following - discussed plans with him today.  -PT  -Possibly home tomorrow or the next day.       2. Atrophic vaginitis     Assessment: Asymptomatic. Just started on Estrace by Urology in 5/2017.  Plan: Discontinue Estrace given her graft occlusion     3. Hyperlipidemia     Assessment: LDL of 173 while on Atorvastatin 80 mg daily.  She relays a family history of CAD with her father and grandfather passing from an MI and her cousin undergoing triple bypass surgery at age 36. Most of her family has had trouble with hyperlipidemia, including 2 of her 3 sons.     Plan: Change to Crestor 40 mg daily. Will also add Zetia 5 mg daily. Repeat lipid panel in 3 months.     4. Depression     Assessment: This has been stable.   Plan: Continue bupropion.     Interval History   Taken to OR yesterday evening for repair of femoral artery. Pain controlled. Feels weak.     Physical Exam   Temp: 97.9  F (36.6  C) Temp src: Oral BP: 105/56   Heart Rate: 80 Resp: 13 SpO2: 97 % O2 Device: None (Room air) Oxygen Delivery: 2 LPM  Vitals:    04/30/18 0719   Weight: 68.5 kg (151 lb)     Vital Signs with Ranges  Temp:  [96.7  F (35.9  C)-98.4  F (36.9  C)] 97.9  F (36.6  C)  Heart Rate:  [58-81] 80  Resp:  [8-27] 13  BP: ()/(56-86) 105/56  SpO2:  [93 %-100 %] 97 %  I/O last 3 completed shifts:  In: 2450 [P.O.:50; I.V.:2400]  Out: 1750 [Urine:1700; Blood:50]    Constitutional: Awake, alert, cooperative, no apparent distress, and appears stated age.  Eyes: Lids and lashes normal, sclera clear, conjunctiva normal.  ENT: Normocephalic, without obvious abnormality, atraumatic, oral pharynx with  moist mucus membranes  Respiratory: No increased work of breathing, good air exchange, clear to auscultation bilaterally, no crackles or wheezing.  Cardiovascular: Regular rate and rhythm, normal S1 and S2, no S3 or S4, and no murmur noted.  GI: Normal bowel sounds, soft, non-distended, non-tender  Skin: No bruising or bleeding, normal skin color, texture, turgor, no redness, warmth, or swelling, no rashes. Right groin site c/d/i.   Musculoskeletal: There is no redness, warmth, or swelling of the joints. LLE swelling, chronic.   Neurologic: Awake, alert, oriented to name, place and time.  Cranial nerves II-XII are grossly intact.    Neuropsychiatric: Calm, normal eye contact, alert, normal affect, oriented to self, place, time and situation, memory for past and recent events intact and thought process normal.  Pulses: Palpable DP pulses bilaterally.     Medications     HEParin 750 Units/hr (05/02/18 1006)       aspirin EC  81 mg Oral Daily     buPROPion  300 mg Oral QAM     docusate sodium  100 mg Oral BID     [START ON 5/3/2018] rivaroxaban ANTICOAGULANT  15 mg Oral BID w/meals     rosuvastatin  40 mg Oral At Bedtime       Data     Recent Labs  Lab 05/02/18  0755 05/01/18  2130 05/01/18  1700 05/01/18  0545 04/30/18  0740   WBC  --  10.2 8.4 9.8 5.7   HGB  --  10.0* 9.5* 12.3 13.7   MCV  --  91 91 91 91   PLT  --  136* 161 152 236   INR  --   --  1.43*  --  0.96     --   --  140  --    POTASSIUM 3.5  --   --  3.8  --    CHLORIDE 109  --   --  107  --    CO2 24  --   --  22  --    BUN 11  --   --  12  --    CR 0.59  --   --  0.56 0.71   ANIONGAP 6  --   --  11  --    LONG 7.3*  --   --  7.6*  --    GLC 84  --  89 102*  --      No results found for this or any previous visit (from the past 24 hour(s)).

## 2018-05-02 NOTE — OR NURSING
Surgeon Dr Owens on phone-  aware of bloody urine- orders to start heparin gt- orders in- waiting for pharmacy to dose and set rate- PCA rehooked up to R FA- not restarted. ordrs for type and screen- lab at bedside- TS drawn by lab. Orders to get Hgb at 2100 per Dr Owens- d/c orders for plavix- resart sa on floor per Dr Owens.

## 2018-05-02 NOTE — ANESTHESIA POSTPROCEDURE EVALUATION
Patient: Angeli Jacobson    Procedure(s):  EMERGENCY RIGHT FEMORAL EXPLORATION WITH FEMORAL ARTERY REPAIR.    EBL: 50mL - Wound Class: II-Clean Contaminated    Diagnosis:UNKNOWN  Diagnosis Additional Information: No value filed.    Anesthesia Type:  General, ETT    Note:  Anesthesia Post Evaluation    Patient location during evaluation: PACU  Patient participation: Able to fully participate in evaluation  Level of consciousness: awake and alert  Pain management: adequate  Airway patency: patent  Cardiovascular status: acceptable  Respiratory status: acceptable  Hydration status: acceptable  PONV: none and controlled     Anesthetic complications: None          Last vitals:  Vitals:    05/02/18 0200 05/02/18 0400 05/02/18 0600   BP: 131/73 128/69 121/56   Pulse:      Resp: 14 15 15   Temp:      SpO2: 97% 98% 95%         Electronically Signed By: Pravin Powers MD  May 2, 2018  7:47 AM

## 2018-05-03 ENCOUNTER — APPOINTMENT (OUTPATIENT)
Dept: PHYSICAL THERAPY | Facility: CLINIC | Age: 68
DRG: 271 | End: 2018-05-03
Attending: PHYSICIAN ASSISTANT
Payer: MEDICARE

## 2018-05-03 VITALS
HEART RATE: 88 BPM | SYSTOLIC BLOOD PRESSURE: 121 MMHG | TEMPERATURE: 97.7 F | WEIGHT: 151 LBS | OXYGEN SATURATION: 96 % | HEIGHT: 66 IN | DIASTOLIC BLOOD PRESSURE: 62 MMHG | RESPIRATION RATE: 16 BRPM | BODY MASS INDEX: 24.27 KG/M2

## 2018-05-03 LAB — LMWH PPP CHRO-ACNC: 0.28 IU/ML

## 2018-05-03 PROCEDURE — A9270 NON-COVERED ITEM OR SERVICE: HCPCS | Mod: GY | Performed by: SURGERY

## 2018-05-03 PROCEDURE — 25000132 ZZH RX MED GY IP 250 OP 250 PS 637: Mod: GY | Performed by: INTERNAL MEDICINE

## 2018-05-03 PROCEDURE — 25000132 ZZH RX MED GY IP 250 OP 250 PS 637: Mod: GY | Performed by: SURGERY

## 2018-05-03 PROCEDURE — 99239 HOSP IP/OBS DSCHRG MGMT >30: CPT | Performed by: INTERNAL MEDICINE

## 2018-05-03 PROCEDURE — 97530 THERAPEUTIC ACTIVITIES: CPT | Mod: GP | Performed by: PHYSICAL THERAPIST

## 2018-05-03 PROCEDURE — 40000193 ZZH STATISTIC PT WARD VISIT: Performed by: PHYSICAL THERAPIST

## 2018-05-03 PROCEDURE — 36415 COLL VENOUS BLD VENIPUNCTURE: CPT | Performed by: SURGERY

## 2018-05-03 PROCEDURE — 85520 HEPARIN ASSAY: CPT | Performed by: SURGERY

## 2018-05-03 PROCEDURE — 97161 PT EVAL LOW COMPLEX 20 MIN: CPT | Mod: GP | Performed by: PHYSICAL THERAPIST

## 2018-05-03 PROCEDURE — 25000128 H RX IP 250 OP 636: Performed by: INTERNAL MEDICINE

## 2018-05-03 PROCEDURE — A9270 NON-COVERED ITEM OR SERVICE: HCPCS | Mod: GY | Performed by: INTERNAL MEDICINE

## 2018-05-03 PROCEDURE — 25000132 ZZH RX MED GY IP 250 OP 250 PS 637: Mod: GY | Performed by: RADIOLOGY

## 2018-05-03 PROCEDURE — 97116 GAIT TRAINING THERAPY: CPT | Mod: GP | Performed by: PHYSICAL THERAPIST

## 2018-05-03 PROCEDURE — A9270 NON-COVERED ITEM OR SERVICE: HCPCS | Mod: GY | Performed by: RADIOLOGY

## 2018-05-03 RX ORDER — OXYCODONE HYDROCHLORIDE 5 MG/1
5-10 TABLET ORAL EVERY 4 HOURS PRN
Qty: 14 TABLET | Refills: 0 | Status: SHIPPED | OUTPATIENT
Start: 2018-05-03 | End: 2018-05-17

## 2018-05-03 RX ORDER — EZETIMIBE 10 MG/1
5 TABLET ORAL DAILY
Qty: 90 TABLET | Refills: 1 | Status: SHIPPED | OUTPATIENT
Start: 2018-05-03 | End: 2018-05-22

## 2018-05-03 RX ORDER — ROSUVASTATIN CALCIUM 40 MG/1
40 TABLET, COATED ORAL AT BEDTIME
Qty: 90 TABLET | Refills: 3 | Status: SHIPPED | OUTPATIENT
Start: 2018-05-03 | End: 2018-05-22

## 2018-05-03 RX ORDER — CALCIUM CARBONATE 500 MG/1
1000 TABLET, CHEWABLE ORAL
Status: DISCONTINUED | OUTPATIENT
Start: 2018-05-03 | End: 2018-05-03 | Stop reason: HOSPADM

## 2018-05-03 RX ADMIN — CALCIUM CARBONATE (ANTACID) CHEW TAB 500 MG 1000 MG: 500 CHEW TAB at 02:01

## 2018-05-03 RX ADMIN — BUPROPION HYDROCHLORIDE 300 MG: 300 TABLET, FILM COATED, EXTENDED RELEASE ORAL at 08:44

## 2018-05-03 RX ADMIN — ASPIRIN 81 MG: 81 TABLET, COATED ORAL at 08:44

## 2018-05-03 RX ADMIN — DOCUSATE SODIUM 100 MG: 100 CAPSULE, LIQUID FILLED ORAL at 08:44

## 2018-05-03 RX ADMIN — OXYCODONE HYDROCHLORIDE 5 MG: 5 TABLET ORAL at 13:09

## 2018-05-03 RX ADMIN — HEPARIN SODIUM 750 UNITS/HR: 10000 INJECTION, SOLUTION INTRAVENOUS at 02:01

## 2018-05-03 RX ADMIN — ACETAMINOPHEN 500 MG: 500 TABLET, FILM COATED ORAL at 04:29

## 2018-05-03 RX ADMIN — RIVAROXABAN 15 MG: 15 TABLET, FILM COATED ORAL at 08:44

## 2018-05-03 RX ADMIN — OXYCODONE HYDROCHLORIDE 5 MG: 5 TABLET ORAL at 08:54

## 2018-05-03 NOTE — PROGRESS NOTES
"Vasular Surgery Progress Note         Assessment and Plan:   Assessment:   Post-operative day #2  Procedure(s) with comments:  EXPLORE GROIN (Right) - EMERGENCY RIGHT FEMORAL EXPLORATION WITH FEMORAL ARTERY REPAIR.    EBL: 50mL    Patient is a 69 yo F who underwent a L leg angiogram on 4/30 for occluded SFA graft, with successful recanalization. Post op course complicated by continued bleeding at the sheath site, requiring a R groin exploration and femoral artery repair.     Doing well post op, thompson removed and transitioned to oral medications. Plan to transition off of heparin gtt today to Xarelto.      Plan:   - encourage ambulation and IS  - continue heparin gtt, will transition to oral anticoagulant today  - regular diet  - po pain med PRNS    Vascular medicine continuing to follow, appreciate their assistance.             Interval History:   Patient is doing well, continued pain at R groin site. Denies any leg pain or weakness. Tolerating regular diet without nausea or emesis. No acute concerns.          Physical Exam:     Gen:  This is a well developed, well nourished female in no apparent distress.  Blood pressure 105/63, pulse 86, temperature 98.3  F (36.8  C), temperature source Oral, resp. rate 16, height 1.676 m (5' 6\"), weight 68.5 kg (151 lb), last menstrual period 03/18/2005, SpO2 96 %, not currently breastfeeding.  Lungs - clear to ascultation.    Heart - Regular rate & rhythm without murmur  Abdomen: soft, NT, ND.  Extremities - R groin incision c/d/i with surrounding hematoma stable. Palpable DP/PT pulses b/l. Edema LLE stable.   Neurologic - nonfocal          Data:     WBC   Date Value Ref Range Status   05/01/2018 10.2 4.0 - 11.0 10e9/L Final   ]       Mercedes Haro MD- General Surgery Resident, PGY5  [phone]     I agree with the above.  No issues with voiding.  Right groin clear.  Palp DPs bilat.  Start Xarelto today.  Ok to ambulate on limited basis.  OK to use her compression garments " for left leg lymphedema.    Possible D/C home if OK with Dr Prince.  May shower.  F/U Dr Elijah Owens in 10-14 days for suture removal.    Elijah Owens MD

## 2018-05-03 NOTE — PLAN OF CARE
Problem: Patient Care Overview  Goal: Plan of Care/Patient Progress Review  Discharge Planner PT   Patient plan for discharge: home  Current status: Patient seen for initial eval and treatment provided. Patient was educated on different ways to get in/out of bed and eventually she was able to perform with just supervision and extra time with a leg . Transfers sit to stand with supervision with extra time. Amb 70 feet without AD with decreased speed but no LOB. Able to go up and down a flight of steps with supervision and one rail. Issued patient a leg  and she was provided written information on tub benches available.   Barriers to return to prior living situation: none  Recommendations for discharge: Home with assist of  if needed. (May need for dressing and with bathing.)  Rationale for recommendations: Patients  is home and per patient available and capable of assisting.        Entered by: Reina Davalos 05/03/2018 1:26 PM

## 2018-05-03 NOTE — PLAN OF CARE
Problem: Patient Care Overview  Goal: Plan of Care/Patient Progress Review  Outcome: Improving  A&O. VSS. Pain managed with oxycodone. Heparin at 750. R groin ecchymotic, dressing with dried marked drainage. Up with Ax1. Benitez removed this am, voided 50cc, bladder scan of 349, no c/o discomfort. C/o gas pains, no flatus, no BM

## 2018-05-03 NOTE — PLAN OF CARE
Problem: Patient Care Overview  Goal: Plan of Care/Patient Progress Review  Outcome: No Change  A&O. VSS. Oxycodone and Tylenol managing pain. CMS intact. +2 edema to left leg-baseline per patient. DP pulses palpated +2, and tibial pulses dopplered. Bruising unchanged to right groin - soft with no hematoma or bruit noted. Heparin gtt infusing at 7.5 ml/hr. Difficulty emptying bladder. Voided 125 ml and will try again before bed.

## 2018-05-03 NOTE — PLAN OF CARE
Problem: Patient Care Overview  Goal: Plan of Care/Patient Progress Review  Outcome: No Change  Pt A/O, VSS on RA.  Pain managed with prn oxycodone.  Heparin gtt at 7.5mL/hr.  R groin ecchymotic, soft, dressing with marked dried drainage, no changes.  C/o indigestion, Tums given, pt reported indigestion relieved.  BS hypoactive, no flatus, no BM.  Voiding better, no c/o bladder discomfort, encouraged fluids.  Up w/asst x1.  Anticipate d/c later today or tomorrow.

## 2018-05-03 NOTE — PROGRESS NOTES
05/03/18 1120   Quick Adds   Type of Visit Initial PT Evaluation   Living Environment   Lives With spouse   Living Arrangements house   Home Accessibility bed and bath are not on the first floor;stairs to enter home;stairs within home   Number of Stairs to Enter Home 2  (no rail)   Number of Stairs Within Home 12   Stair Railings at Home inside, present on right side   Transportation Available car;family or friend will provide   Self-Care   Usual Activity Tolerance moderate   Current Activity Tolerance fair   Functional Level Prior   Ambulation 0-->independent   Transferring 0-->independent   Toileting 0-->independent   Bathing 0-->independent   Dressing 0-->independent   Eating 0-->independent   Communication 0-->understands/communicates without difficulty   Swallowing 0-->swallows foods/liquids without difficulty   Cognition 0 - no cognition issues reported   General Information   Onset of Illness/Injury or Date of Surgery - Date 04/30/18   Referring Physician Shelly Trinidad PA-C   Patient/Family Goals Statement To go home with assist of  today.    Pertinent History of Current Problem (include personal factors and/or comorbidities that impact the POC) Patient admitted on 4/30/18. Had emergen right femoral exploration awith femoral artery repair on 5/1/18   Precautions/Limitations fall precautions   Weight-Bearing Status - LUE full weight-bearing   Weight-Bearing Status - RUE full weight-bearing   Weight-Bearing Status - LLE full weight-bearing   Weight-Bearing Status - RLE weight-bearing as tolerated   General Observations Patient seems somewhat hesitant to do too much. Fearful of affecting stitches.    Cognitive Status Examination   Orientation orientation to person, place and time   Level of Consciousness alert   Follows Commands and Answers Questions 100% of the time   Personal Safety and Judgment intact   Memory intact   Pain Assessment   Patient Currently in Pain Yes, see Vital Sign flowsheet    Integumentary/Edema   Integumentary/Edema Comments Has edema garment on left LE. Did not observe surgical site on the right.    Posture    Posture Not impaired   Range of Motion (ROM)   ROM Comment Decreased right hip range due to fear of disrupting    Strength   Strength Comments LE's and UE's functional but did not perform MMT's   Bed Mobility   Bed Mobility Comments Sup to sit with difficulty. Improved with practice and leg .    Transfer Skills   Transfer Comments Sit to stand with supervison only.    Gait   Gait Comments Gait without AD 5 feet in room with cues not to hold onto anything.    Balance   Balance Comments Good. No LOB with amb.    Sensory Examination   Sensory Perception no deficits were identified   Coordination   Coordination no deficits were identified   Muscle Tone   Muscle Tone no deficits were identified   General Therapy Interventions   Planned Therapy Interventions gait training;transfer training   Clinical Impression   Criteria for Skilled Therapeutic Intervention yes, treatment indicated   PT Diagnosis impaired functional independence   Influenced by the following impairments Decreased strength and ROM in right hip.   Functional limitations due to impairments Needed assist and education on how to transfer sup to/from sit, assist with amb and stairs.    Clinical Presentation Stable/Uncomplicated   Clinical Decision Making (Complexity) Low complexity   Therapy Frequency` (One time eval only. )   Predicted Duration of Therapy Intervention (days/wks) One time only.    Anticipated Equipment Needs at Discharge (Leg  (issued her one).)   Anticipated Discharge Disposition Home  ( to assist if needed. )   Risk & Benefits of therapy have been explained Yes   Patient, Family & other staff in agreement with plan of care Yes   Total Evaluation Time   Total Evaluation Time (Minutes) 10

## 2018-05-03 NOTE — DISCHARGE SUMMARY
[facility]    Discharge Summary  Vascular Medicine    Date of Admission:  4/30/2018  Date of Discharge:  5/3/2018  Discharging Provider: Tiesha Prince MD  Date of Service (when I saw the patient): 05/03/18         Physician Supervisory Attestation:   I have reviewed and discussed with the physician assistant their history, physical and plan and independently interviewed and examined Angeli Jacobson and agree with the plan as stated in the physician assistant note.    She is doing well. Worked with PT.   Off heparin started xarelto 15 bid 21 days then 20 mg daily  Surgical site looks good. Discussed with Dr. Roman velásquez with [facility] , repeat FLP in 3 months  Follow up with Dr. Owesn    Total patient care  time spent today 65 minutes.    CC: Dr. Owens  Primary MD Tiesha Prince MD,[facility]  Vascular Medicine   5/3/2018      Discharge Diagnoses   1. Left SFA PTFE interposition graft occlusion with initiation of catheter-directed lysis 4/30/18 followed by mechanical thrombectomy with possis, angioplasty, and placement of Viabahn stent in the proximal bypass graft as well as well as primary repair in the OR of right femoral artery access site 5/1/18      2. Atrophic vaginitis      3. Hyperlipidemia    4. Depression      Hospital Course   Angeli Jacobson was admitted on 4/30/2018.  The following problems were addressed during her hospitalization:    1. 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 The mass was excised along with the vein and artery in the left thigh and her SFA was reconstructed utilizing a GoreTex graft. She had been doing  well up until the past 2.5 months when she began to notice increasing fatigue in her left leg when ambulating along with some left calf cramping. ABIs were normal on the right and 0.59 on the left at rest, dropping to 0.39 following exercise. She was evaluated by Dr. Owens of Vascular Surgery and an angiogram was recommended, for which she presented 4/30/18. Her graft was found to be occluded, but he was able to traverse the occlusion with a wire. As such, catheter-directed lysis was initiated in an attempt to reopen the graft. However, sometime in the night she developed a hematoma at the access site in her right groin and lysis was stopped. She was taken back to IR on 5/1/18 at which time the leg bypass graft was successfully recanalized with angioplasty and Viabahn stenting of the proximal graft. At the completion of the procedure, the sheath was removed and pressure was held over her groin for over 45 minutes with ongoing bleeding. She was then taken to the OR by Dr. Owens for primary closure of the right femoral artery access site.     She now has palpable pedal pulses bilaterally. Her pain is presently well controlled. While recanalizing the left femoral graft, she did embolize down to the left peroneal artery. This is non-occlusive, but thrombus remains. She does have good 2 vessel run-off. Given this, as well as still with likely ongoing irregularity in her bypass graft, it is felt best for her to discharge on anticoagulation. Coumadin versus DOACs were discussed with her along with their risks and benefits. She has elected to go home on Xarelto. She will be taking 15 mg twice daily for 21 days total, followed by 20 mg daily with supper thereafter. She will need to follow up with Dr. Owens in the next 2 weeks and he can determine how long he wants to keep her on anticoagulation moving forward.     It is likely that her severe hyperlipidemia as well as recent initiation of hormone replacement therapy in the  past year contributed to her graft occlusion. She has been told to stop her Estrace cream and her lipid medications have been adjusted.     2. Atrophic vaginitis      She was just started on Estrace by Urology in 5/2017. She states she had no real symptoms in regards to this. Given her graft occlusion, it is recommended that she discontinue this.       3. Hyperlipidemia      Her lipid panel this admission was significant for an LDL of 173, HDL 68, triglycerides 68, and total cholesterol 255. She has been on Atorvastatin 80 mg daily for as long as she can remember. She relays a family history of CAD with her father and grandfather passing from an MI and her cousin undergoing triple bypass surgery at age 36. Most of her family has had trouble with hyperlipidemia, including 2 of her 3 sons. She should have tighter control of her lipids and will change Atorvastatin to Crestor 40 mg daily. This alone will be unlikely to get her to goal. Therefore we will also add Zetia 5 mg daily. She should have a lipid panel rechecked in 3 months. If her lipids are still not at goal (LDL<70), then increase Zetia further to 10 mg daily.    4. Depression      This has been stable. Continue bupropion.           Code Status   Full Code    Primary Care Physician   Sepideh Burris    Time Spent on this Encounter   Spent greater than 30 minutes discharging this patient.    Discharge Disposition   Discharged to home  Condition at discharge: Stable    Consultations This Hospital Stay   PHARMACY IP CONSULT  HOSPITALIST IP CONSULT  PHYSICAL THERAPY ADULT IP CONSULT      Discharge Orders     Reason for your hospital stay   Left lower extremity graft occlusion s/p catheter-directed lysis, mechanical thrombectomy with possis, and angioplasty and Viabahn stenting of the proximal graft. This was complicated by hemorrhage at the access site in the right femoral artery necessitating primary closure in the OR.     Follow-up and recommended labs and  tests    Follow up with Dr. Owens in 2 weeks. Please call [phone] to schedule or if you have any additional questions for Dr. Owens or Vascular Medicine.    Have your cholesterol rechecked in 3 months through your primary care provider.     Activity   Your activity upon discharge: activity as tolerated     Discharge Instructions   1. Stop you Atorvastatin (Lipitor) and instead start Rosuvastatin (Crestor) for your cholesterol. Also add in Zetia 5 mg daily. You should then have your cholesterol rechecked in 3 months.     2. You will be taking Xarelto for anticoagulation. Start with 15 mg twice daily (with breakfast and supper) for a total of 21 days. Then you should switch over to 20 mg tablets daily (with supper) only. Always take it with food.    3. Don't take your fish oil while on aspirin and Xarelto as this can increase your chances of bleeding.     4. Stop your Estrace cream.     Full Code     Diet   Follow this diet upon discharge: Regular Diet Adult       Discharge Medications   Current Discharge Medication List      START taking these medications    Details   ezetimibe (ZETIA) 10 MG tablet Take 0.5 tablets (5 mg) by mouth daily  Qty: 90 tablet, Refills: 1    Associated Diagnoses: Hyperlipidemia LDL goal <70      oxyCODONE IR (ROXICODONE) 5 MG tablet Take 1-2 tablets (5-10 mg) by mouth every 4 hours as needed for moderate to severe pain  Qty: 14 tablet, Refills: 0    Associated Diagnoses: Acute post-operative pain      !! rivaroxaban ANTICOAGULANT (XARELTO) 15 MG TABS tablet Take 1 tablet (15 mg) by mouth 2 times daily (with meals)  Qty: 41 tablet, Refills: 0    Associated Diagnoses: Vascular graft occlusion, initial encounter (H)      !! rivaroxaban ANTICOAGULANT (XARELTO) 20 MG TABS tablet Take 1 tablet (20 mg) by mouth daily (with dinner) Start after completing 15 mg tablets  Qty: 90 tablet, Refills: 1    Associated Diagnoses: Vascular graft occlusion, initial encounter (H)      rosuvastatin (CRESTOR)  40 MG tablet Take 1 tablet (40 mg) by mouth At Bedtime  Qty: 90 tablet, Refills: 3    Associated Diagnoses: Hyperlipidemia LDL goal <70       !! - Potential duplicate medications found. Please discuss with provider.      CONTINUE these medications which have NOT CHANGED    Details   alendronate (FOSAMAX) 70 MG tablet Take 1 tablet (70 mg) by mouth with 8oz water every 7 days 30 minutes before breakfast and remain upright during this time.  Qty: 12 tablet, Refills: 3    Associated Diagnoses: Age-related osteoporosis without current pathological fracture      aspirin 81 MG tablet Take 1 tablet by mouth daily.  Refills: 3    Associated Diagnoses: Mixed hyperlipidemia      buPROPion (WELLBUTRIN XL) 300 MG 24 hr tablet Take 1 tablet (300 mg) by mouth every morning  Qty: 90 tablet, Refills: 3    Associated Diagnoses: Major depression in complete remission (H)      MULTI-VITAMIN OR TABS 1 TABLET DAILY  Qty: 30, Refills: prn    Associated Diagnoses: Routine general medical examination at a health care facility      !! ORDER FOR DME Equipment being ordered: lymphedema bandages  Qty: 2 Device, Refills: 1    Associated Diagnoses: Personal history of malignant neoplasm of other site; Other lymphedema      !! order for DME Equipment being ordered: Left Thigh High Compression Stocking, 550 CCL.3  Qty: 1 each, Refills: 99    Associated Diagnoses: Myxoid liposarcoma (H)       !! - Potential duplicate medications found. Please discuss with provider.      STOP taking these medications       atorvastatin (LIPITOR) 80 MG tablet Comments:   Reason for Stopping:         estradiol (ESTRACE VAGINAL) 0.1 MG/GM cream Comments:   Reason for Stopping:         Omega-3 Fatty Acids (FISH OIL PO) Comments:   Reason for Stopping:             Allergies   Allergies   Allergen Reactions     Celexa [Citalopram Hydrobromide]      Decreased libido     Data   Most Recent 3 CBC's:  Recent Labs   Lab Test  05/02/18   2138  05/01/18   2130  05/01/18   1700   05/01/18   0545   WBC   --   10.2  8.4  9.8   HGB  8.5*  10.0*  9.5*  12.3   MCV   --   91  91  91   PLT   --   136*  161  152      Most Recent 3 BMP's:  Recent Labs   Lab Test  05/02/18   0755  05/01/18   1700  05/01/18   0545  04/30/18   0740  08/24/17   0929   NA  139   --   140   --   141   POTASSIUM  3.5   --   3.8   --   4.1   CHLORIDE  109   --   107   --   106   CO2  24   --   22   --   27   BUN  11   --   12   --   15   CR  0.59   --   0.56  0.71  0.81   ANIONGAP  6   --   11   --   8   LONG  7.3*   --   7.6*   --   9.3   GLC  84  89  102*   --   86     Most Recent 2 LFT's:  Recent Labs   Lab Test  08/24/17   0929  05/29/15   1125   AST  27  19   ALT  30  30   ALKPHOS  88  87   BILITOTAL  0.5  0.4     Most Recent INR's and Anticoagulation Dosing History:  Anticoagulation Dose History     Recent Dosing and Labs Latest Ref Rng & Units 4/30/2018 5/1/2018    INR 0.86 - 1.14 0.96 1.43(H)        Most Recent Cholesterol Panel:  Recent Labs   Lab Test  04/30/18   0740   CHOL  255*   LDL  173*   HDL  68   TRIG  68       Most Recent TSH, T4 and A1c Labs:  Recent Labs   Lab Test  04/30/18   0740  10/03/17   0904  08/24/17   0929   TSH   --   3.60  4.75*   T4   --    --   0.94   A1C  5.8   --    --      Results for orders placed or performed during the hospital encounter of 04/30/18   IR Lower Extremity Angiogram Left    Addendum: 5/1/2018    ADDENDUM:     IMPRESSION SHOULD READ:     IMPRESSION:  1. No hemodynamically significant inflow disease. Widely patent left  common femoral and profunda femoral arteries. Extensive profunda  collaterals reconstitute the native superficial femoral artery in the  mid thigh. Widely patent popliteal and left-sided tibial vessels to  the foot.  2. Successful crossing of the thrombosed proximal left superficial  femoral artery 6 mm PTFE interposition graft with placement of a 20 cm  infusion catheter and initiation of TPA thrombolysis. Plan for lytic  recheck of the left leg  tomorrow.    SHADE OWENS MD      [facility]    INTERVENTIONAL RADIOLOGY LEFT LOWER EXTREMITY ANGIOGRAM LEFT   4/30/2018  10:13 AM    PREOPERATIVE DIAGNOSIS: Status post grafting of proximal left  superficial femoral artery utilizing a 6 mm PTFE graft in the remote  past for sarcoma excision now with thrombosed graft and short distance  left leg claudication.    POSTOPERATIVE DIAGNOSIS: Status post grafting of proximal left  superficial femoral artery utilizing a 6 mm PTFE graft in the remote  past for sarcoma excision now with thrombosed graft and short distance  left leg claudication.    PROCEDURE PERFORMED:  1. Pelvic arteriogram.  2. Selective left lower extremity arteriogram.  3. Placement of infusion catheter in thrombosed left superficial  femoral artery graft with initiation of thrombolysis    SURGEON: Shade Owens MD    OPERATIVE INDICATIONS: This patient is a 68-year-old female with  hyperlipidemia who had resection of a proximal left thigh sarcoma in  2000. At that procedure she had resection of her left femoral vein and  her proximal left superficial femoral artery. The superficial femoral  artery was reconstructed in an end-to-end manner with a 6 mm PTFE  interposition graft. 2-1/2 months ago she fell landing on both knees.  Ever since then she has had left leg weakness and less than 2 block  claudication. She has easily palpable right-sided pedal pulses. She  has biphasic left-sided pedal signals. Her resting left-sided ADRIAN is  0.6 decreasing to 0.3 after 5 minutes.    DESCRIPTION OF TECHNIQUE: After informed consent was obtained the  patient was placed on the imaging table and her bilateral groins were  prepped and draped in the usual sterile fashion. Conscious sedation  was achieved utilizing 2 mg IV Versed and 100 mcg IV fentanyl. Bharti Bower RN monitored the patient throughout this procedure under my  supervision. Total sedation time was 43 minutes. 8.5 minutes of  fluoroscopy time and 36  mGy total fluoroscopy dose were required. Skin  directly over the right common femoral artery was locally anesthetized  with 10mL of 1% lidocaine. The right common femoral artery was  percutaneously accessed using an 18-gauge needle and single wall  puncture technique. A T. Doc wire was advanced up into the aorta. An  Omni Flush catheter was advanced into the distal aorta. A pelvic  arteriogram was performed. An angled glide wire glide catheter  combination was directed into the distal left external iliac artery.  Selective left lower extremity angiography was performed via  injections through this catheter. This clearly showed the occluded  PTFE graft which originated in the proximal left superficial femoral  artery and extended to the level of mid thigh. I chose to _probe_ this  thrombosed graft with an angled glide wire glide catheter combination.  With little difficulty I was able to pass this wire catheter  combination through the thrombosed graft and into the native distal  left SFA. This was verified under fluoroscopy. Wire exchange was made  for a Solorzano wire. Sheath exchange was made for a 6 Amharic crossover  sheath. A 20 cm infusion catheter was appropriately positioned within  the thrombosed PTFE graft. TPA thrombolysis was then initiated at 0.5  mg per hour. Heparin is being run through the sheath at 500 units per  hour. The patient tolerated the procedure without incident. A Benitez  catheter was placed. She was admitted to the floor with plans for a  lytic recheck tomorrow morning.    FINDINGS:    Pelvic arteriogram: The visualized aorta and bilateral iliac arteries  are widely patent.    Selective left lower extremity arteriogram: The left common femoral  and profunda femoral arteries are widely patent. There are extensive  profunda collaterals. The left superficial femoral artery PTFE graft  likely originates within 2 cm of the origin of that vessel. Multiple  surgical clips are noted consistent with  the prior sarcoma resection.  There is reconstitution of the native superficial femoral artery at  mid thigh. The popliteal artery and all 3 tibial vessels are widely  patent to the level visualized in the distal calf.      Impression    IMPRESSION:  1. No hemodynamically significant inflow disease. Widely patent left  common femoral and profunda femoral arteries. Extensive profunda  collaterals reconstitute the native superficial femoral artery in the  mid sarcoma excision sarcoma excision thigh. Widely patent popliteal  and left-sided tibial vessels to the foot.  3. Successful crossing of the thrombosed proximal left superficial  femoral artery 6 mm PTFE interposition graft with placement of a 20 cm  infusion catheter and initiation of TPA thrombolysis. Plan for lytic  recheck of the left leg tomorrow.    JACKSON CEDILLO MD

## 2018-05-03 NOTE — PLAN OF CARE
Problem: Patient Care Overview  Goal: Plan of Care/Patient Progress Review  Outcome: Adequate for Discharge Date Met: 05/03/18  A&O. VSS. Pain managed with oxycodone. Discussed discharge instructions and medications with pt and pt's spouse, verbalized understanding. Pt discharged to home with belongings, medications, and leg hook to transport leg back in to bed. Transportation provided by spouse

## 2018-05-04 ENCOUNTER — MYC MEDICAL ADVICE (OUTPATIENT)
Dept: FAMILY MEDICINE | Facility: CLINIC | Age: 68
End: 2018-05-04

## 2018-05-04 DIAGNOSIS — J30.2 SEASONAL ALLERGIC RHINITIS, UNSPECIFIED CHRONICITY, UNSPECIFIED TRIGGER: Primary | ICD-10-CM

## 2018-05-04 RX ORDER — FLUTICASONE PROPIONATE 50 MCG
1-2 SPRAY, SUSPENSION (ML) NASAL DAILY
Qty: 1 BOTTLE | Refills: 11 | Status: SHIPPED | OUTPATIENT
Start: 2018-05-04 | End: 2019-06-14

## 2018-05-05 ENCOUNTER — TELEPHONE (OUTPATIENT)
Dept: FAMILY MEDICINE | Facility: CLINIC | Age: 68
End: 2018-05-05

## 2018-05-05 ENCOUNTER — APPOINTMENT (OUTPATIENT)
Dept: ULTRASOUND IMAGING | Facility: CLINIC | Age: 68
End: 2018-05-05
Attending: EMERGENCY MEDICINE
Payer: MEDICARE

## 2018-05-05 ENCOUNTER — NURSE TRIAGE (OUTPATIENT)
Dept: NURSING | Facility: CLINIC | Age: 68
End: 2018-05-05

## 2018-05-05 ENCOUNTER — HOSPITAL ENCOUNTER (EMERGENCY)
Facility: CLINIC | Age: 68
Discharge: HOME | End: 2018-05-05
Attending: EMERGENCY MEDICINE | Admitting: EMERGENCY MEDICINE
Payer: MEDICARE

## 2018-05-05 VITALS
TEMPERATURE: 98.5 F | HEART RATE: 91 BPM | DIASTOLIC BLOOD PRESSURE: 77 MMHG | SYSTOLIC BLOOD PRESSURE: 139 MMHG | HEIGHT: 67 IN | WEIGHT: 148 LBS | OXYGEN SATURATION: 98 % | BODY MASS INDEX: 23.23 KG/M2 | RESPIRATION RATE: 16 BRPM

## 2018-05-05 DIAGNOSIS — T14.8XXA BRUISING: ICD-10-CM

## 2018-05-05 DIAGNOSIS — D64.9 ANEMIA, UNSPECIFIED TYPE: ICD-10-CM

## 2018-05-05 LAB
ANION GAP SERPL CALCULATED.3IONS-SCNC: 7 MMOL/L (ref 3–14)
BASOPHILS # BLD AUTO: 0 10E9/L (ref 0–0.2)
BASOPHILS NFR BLD AUTO: 0.5 %
BUN SERPL-MCNC: 13 MG/DL (ref 7–30)
CALCIUM SERPL-MCNC: 9 MG/DL (ref 8.5–10.1)
CHLORIDE SERPL-SCNC: 107 MMOL/L (ref 94–109)
CO2 SERPL-SCNC: 29 MMOL/L (ref 20–32)
CREAT SERPL-MCNC: 0.65 MG/DL (ref 0.52–1.04)
DIFFERENTIAL METHOD BLD: ABNORMAL
EOSINOPHIL # BLD AUTO: 0.1 10E9/L (ref 0–0.7)
EOSINOPHIL NFR BLD AUTO: 2.2 %
ERYTHROCYTE [DISTWIDTH] IN BLOOD BY AUTOMATED COUNT: 13.9 % (ref 10–15)
GFR SERPL CREATININE-BSD FRML MDRD: >90 ML/MIN/1.7M2
GLUCOSE SERPL-MCNC: 97 MG/DL (ref 70–99)
HCT VFR BLD AUTO: 24 % (ref 35–47)
HGB BLD-MCNC: 7.9 G/DL (ref 11.7–15.7)
IMM GRANULOCYTES # BLD: 0 10E9/L (ref 0–0.4)
IMM GRANULOCYTES NFR BLD: 0.2 %
LYMPHOCYTES # BLD AUTO: 2.1 10E9/L (ref 0.8–5.3)
LYMPHOCYTES NFR BLD AUTO: 33 %
MCH RBC QN AUTO: 30.6 PG (ref 26.5–33)
MCHC RBC AUTO-ENTMCNC: 32.9 G/DL (ref 31.5–36.5)
MCV RBC AUTO: 93 FL (ref 78–100)
MONOCYTES # BLD AUTO: 0.5 10E9/L (ref 0–1.3)
MONOCYTES NFR BLD AUTO: 8.2 %
NEUTROPHILS # BLD AUTO: 3.6 10E9/L (ref 1.6–8.3)
NEUTROPHILS NFR BLD AUTO: 55.9 %
NRBC # BLD AUTO: 0 10*3/UL
NRBC BLD AUTO-RTO: 0 /100
PLATELET # BLD AUTO: 239 10E9/L (ref 150–450)
POTASSIUM SERPL-SCNC: 3.4 MMOL/L (ref 3.4–5.3)
RBC # BLD AUTO: 2.58 10E12/L (ref 3.8–5.2)
SODIUM SERPL-SCNC: 143 MMOL/L (ref 133–144)
WBC # BLD AUTO: 6.4 10E9/L (ref 4–11)

## 2018-05-05 PROCEDURE — 85025 COMPLETE CBC W/AUTO DIFF WBC: CPT | Performed by: EMERGENCY MEDICINE

## 2018-05-05 PROCEDURE — 99285 EMERGENCY DEPT VISIT HI MDM: CPT | Mod: 25

## 2018-05-05 PROCEDURE — 93926 LOWER EXTREMITY STUDY: CPT | Mod: RT

## 2018-05-05 PROCEDURE — 80048 BASIC METABOLIC PNL TOTAL CA: CPT | Performed by: EMERGENCY MEDICINE

## 2018-05-05 PROCEDURE — 93971 EXTREMITY STUDY: CPT | Mod: RT

## 2018-05-05 ASSESSMENT — ENCOUNTER SYMPTOMS
RESPIRATORY NEGATIVE: 1
COLOR CHANGE: 1
BRUISES/BLEEDS EASILY: 1
FEVER: 0

## 2018-05-05 NOTE — TELEPHONE ENCOUNTER
Reason for Disposition    Caller has URGENT question and triager unable to answer question    [1] Red area or streak [2] large (> 2 in. or 5 cm)    Additional Information    Negative: Sounds like a life-threatening emergency to the triager    Negative: Chest pain    Negative: Difficulty breathing    Negative: Surgical incision symptoms and questions    Negative: [1] Discomfort (pain, burning or stinging) when passing urine AND [2] male    Negative: [1] Discomfort (pain, burning or stinging) when passing urine AND [2] female    Negative: Constipation    Negative: Calf pain    Negative: Dizziness is severe, or persists > 24 hours after surgery    Negative: Pain, redness, swelling, or pus at IV Site    Negative: Symptoms arising from use of a urinary catheter (Benitez or Coude)    Negative: Cast problems or questions    Negative: Medication question    Negative: [1] Widespread rash AND [2] bright red, sunburn-like    Negative: [1] SEVERE headache AND [2] after spinal (epidural) anesthesia    Negative: [1] Vomiting AND [2] persists > 4 hours    Negative: [1] Vomiting AND [2] abdomen looks much more swollen than usual    Negative: [1] Drinking very little AND [2] dehydration suspected (e.g., no urine > 12 hours, very dry mouth, very lightheaded)    Negative: Patient sounds very sick or weak to the triager    Negative: Sounds like a serious complication to the triager    Negative: Fever > 100.5 F (38.1 C)    Negative: [1] SEVERE post-op pain (e.g., excruciating, pain scale 8-10) AND [2] not controlled with pain medications    Negative: [1] Headache AND [2] after spinal (epidural) anesthesia AND [3] not severe    Negative: Severe difficulty breathing (e.g., struggling for each breath, speaks in single words)    Negative: Looks like a broken bone or dislocated joint (e.g., crooked or deformed)    Negative: Sounds like a life-threatening emergency to the triager    Negative: Chest pain    Negative: Followed a leg injury     "Negative: [1] Small area of swelling AND [2] followed an insect bite to the area    Negative: Swelling of knee is main symptom    Negative: Swelling of one ankle joint    Negative: Pregnant    Negative: Postpartum (< 1 month since delivery)    Negative: Difficulty breathing at rest    Negative: Entire foot is cool or blue in comparison to other side    Negative: [1] Can't walk or can barely walk AND [2] new onset    Negative: [1] Difficulty breathing with exertion (e.g., walking) AND [2] new onset or worsening    Negative: [1] Red area or streak AND [2] fever    Negative: [1] Swelling is painful to touch AND [2] fever    Negative: [1] Cast on leg or ankle AND [2] now increased pain    Negative: Patient sounds very sick or weak to the triager    Negative: SEVERE leg swelling (e.g., swelling extends above knee, entire leg is swollen, weeping fluid)    Answer Assessment - Initial Assessment Questions  1. SYMPTOM: \"What's the main symptom you're concerned about?\" (e.g., pain, fever, vomiting)       Leg swelling  Right   2. ONSET: \"When did ________  start?\"      Today , this  Afternoon   3. SURGERY: \"What surgery was performed?\"       5-1  4. DATE of SURGERY: \"When was surgery performed?\"       5-1  5. ANESTHESIA: \" What type of anesthesia did you have?\" (e.g., general, spinal, epidural, local)    Spinal   6. PAIN: \"Is there any pain?\" If so, ask: \"How bad is it?\"  (Scale 1-10; or mild, moderate, severe)      Pain at incision   7. FEVER: \"Do you have a fever?\" If so, ask: \"What is your temperature, how was it measured, and when did it start?\"      No   8. VOMITING: \"Is there any vomiting?\" If yes, ask: \"How many times?\"     no   9. BLEEDING: \"Is there any bleeding?\" If so, ask: \"How much?\" and \"Where?\"      no  10. OTHER SYMPTOMS: \"Do you have any other symptoms?\" (e.g., drainage from wound, painful urination, constipation)        Left eye felt brighter    Protocols used: POST-OP SYMPTOMS AND QUESTIONS-ADULT-AH, LEG " SWELLING AND EDEMA-ADULT-MARSHALL Suh has swelling in her thigh , right leg, just noticed this afternoon . Surgery on 5-1  , vascular surgery by Dr.T Owens at Vascular center at [facility]. There is no increased pain, no drainage or bleeding at insertion site, no fever. She is on 2 blood thinners . Paged on call for Vascular surgery . Dr Blackburn. To her [phone]

## 2018-05-05 NOTE — ED AVS SNAPSHOT
Emergency Department    [address] [place] [id number]    Phone:  [phone]    Fax:  [phone]                                       Angeli Jacobson   MRN: [id number]    Department:   Emergency Department   Date of Visit:  5/5/2018           After Visit Summary Signature Page     I have received my discharge instructions, and my questions have been answered. I have discussed any challenges I see with this plan with the nurse or doctor.    ..........................................................................................................................................  Patient/Patient Representative Signature      ..........................................................................................................................................  Patient Representative Print Name and Relationship to Patient    ..................................................               ................................................  Date                                            Time    ..........................................................................................................................................  Reviewed by Signature/Title    ...................................................              ..............................................  Date                                                            Time

## 2018-05-06 NOTE — DISCHARGE INSTRUCTIONS
Follow up with your primary doctor tomorrow. On 5/1 your hgb was 12.3, then dropped to 9.5 and 10.0.  On 5/2 in was 8.5.  Today it was 7.9.

## 2018-05-06 NOTE — ED PROVIDER NOTES
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 Haro (see discharge summary by Dr. Prince on 5/3/18 for further details). Later that day she reports onset of bruising over her right thigh, with subsequent swelling to the same area over the last day or so. She endorses mild pain to the area which is improved with Tylenol. No chest pain, dyspnea, gait difficulty, or any other acute symptoms.  She spoke to the vascular team today who requested she present to the ED for arterial and venous imaging.        Allergies:  Celexa [Citalopram Hydrobromide]      Medications:    Xarelto  Oxycodone  Rosuvastatin  Bupropion   Aspirin  Fosamax  Zetia      Past Medical History:    Hx vascular graft occlusion, 4/30/18  peripheral arterial disease    Vertigo  Tubular adenoma  Osteoporosis   Depression  Hyperlipidemia   Myxoid liposarcoma  Chronic cervicalgia  Shingles  Tinnitus   Goiter multinodular       Past Surgical History:    Appendectomy   Liposarcoma biopsy and excision     Family History:    CAD   Obesity  Osteoporosis  Hyperlipidemia     Social History:  The patient denies tobacco or alcohol use.   Marital Status:   [2]  Here with       Review of Systems   Constitutional: Negative for fever.   Respiratory: Negative.    Cardiovascular: Positive for leg swelling. Negative for chest pain.   Musculoskeletal: Negative for gait problem.   Skin: Positive for color change.   Hematological: Bruises/bleeds easily.   All other systems reviewed and are negative.      Physical Exam     Patient Vitals for the past 24 hrs:   BP Temp Temp src Pulse Resp SpO2 Height Weight   05/05/18 2202 - - - - - 98 % - -   05/05/18 2201 139/77 - - - - - - -   05/05/18 1936 - - - - - 100 % - -  "  05/05/18 1935 122/66 - - - - - - -   05/05/18 1907 99/61 98.5  F (36.9  C) Oral 91 16 100 % 1.689 m (5' 6.5\") 67.1 kg (148 lb)        Physical Exam  General: Resting on the gurney, appears comfortable  Head:  The scalp, face, and head appear normal  Mouth/Throat: Mucus membranes are moist  CV:  Regular rate    Normal S1 and S2  No pathological murmur   Resp:  Breath sounds clear and equal bilaterally    Non-labored, no retractions or accessory muscle use    No coarseness    No wheezing   GI:  Abdomen is soft, no rigidity    No tenderness to palpation  MS:  Normal motor assessment of all extremities.    Good capillary refill noted.  Skin:  Bruising to the right thigh and groin. Mild swelling of the right leg. Incision clean, dry, and intact.     No rash or lesions noted.  Neuro:   Speech is normal and fluent. No apparent deficit.  Psych: Awake. Alert.  Normal affect.      Appropriate interactions.       Emergency Department Course   Imaging:  Radiographic findings were communicated with the patient and family who voiced understanding of the findings.  US Venous Duplex right lower extremity:  No evidence of deep vein thrombosis. Results per Radiology.       US Arterial Duplex right lower extremity :  The right groin region is unremarkable. No pseudoaneurysm is identified. No hematoma is seen.   Results per Radiology.       Laboratory:  Laboratory findings were communicated with the patient and family who voiced understanding of the findings.  CBC w/diff: RBC 2.53 (L), Hgb 7.9 (L), Hct 24.0 (L), o/w WNL (WBC 6.4, Plt 239)  BMP: WNL (Cr 0.65)     Emergency Department Course:  Past medical records, nursing notes, and vitals reviewed.   1912: I performed an exam of the patient as documented above.    Peripheral IV access established. Blood drawn and sent. The above imaging studies and labs were obtained. Results above.    2143: I rechecked patient.    I discussed the treatment plan with the patient. She expressed " understanding of this plan and consented to discharge. She will be discharged home with instructions for care and follow up. In addition, the patient will return to the emergency department if symptoms persist, worsen, if new symptoms arise or if there is any concern.  All questions were answered.        Impression & Plan    Medical Decision Making:  Angeli Jacobson is a 68 year old female who is four days s/p right femoral vein graft occlusion thrombectomy and repair and presents with thigh pain concerning for a possible DVT vs psuedoaneurism.  Ultrasound showed no evidence of DVT or pseudoaneurism.  She is already anticoagulated on Xarelto. Vital signs are stable and I see no evidence of cellulitis, myositis, bony or musculoskeletal injury or arterial or vascular insufficiency.  I feel the patient is stable for discharge.  The patient was instructed to follow-up with PMD for recheck of her hgb, as she is anemic, but is most likely related to her procedure and is not unexpected.  She will return for any other new or worsening symptoms.     Diagnosis:    ICD-10-CM    1. Bruising T14.8XXA    2. Anemia, unspecified type D64.9        Disposition:   discharged to home       Scribe Disclosure:  Dequan ALBA, am serving as a scribe at 7:11 PM on 5/5/2018 to document services personally performed by Janel Price MD based on my observations and the provider's statements to me.     Dequan Lacey  5/5/2018   EMERGENCY DEPARTMENT         Janel Price MD  05/07/18 0018

## 2018-05-06 NOTE — ED NOTES
"I asked the patient if she was feeling suicidal, today or ever in her life, as instructed by MD.  Her answer was \"No, never\".  "

## 2018-05-07 ENCOUNTER — TELEPHONE (OUTPATIENT)
Dept: FAMILY MEDICINE | Facility: CLINIC | Age: 68
End: 2018-05-07

## 2018-05-07 ENCOUNTER — OFFICE VISIT (OUTPATIENT)
Dept: FAMILY MEDICINE | Facility: CLINIC | Age: 68
End: 2018-05-07
Payer: COMMERCIAL

## 2018-05-07 VITALS
SYSTOLIC BLOOD PRESSURE: 122 MMHG | RESPIRATION RATE: 12 BRPM | TEMPERATURE: 97.8 F | BODY MASS INDEX: 24.64 KG/M2 | WEIGHT: 155 LBS | DIASTOLIC BLOOD PRESSURE: 80 MMHG | HEART RATE: 68 BPM

## 2018-05-07 DIAGNOSIS — D64.9 ANEMIA, UNSPECIFIED TYPE: Primary | ICD-10-CM

## 2018-05-07 LAB
BASOPHILS # BLD AUTO: 0 10E9/L (ref 0–0.2)
BASOPHILS NFR BLD AUTO: 0.5 %
DIFFERENTIAL METHOD BLD: ABNORMAL
EOSINOPHIL # BLD AUTO: 0.2 10E9/L (ref 0–0.7)
EOSINOPHIL NFR BLD AUTO: 2.8 %
ERYTHROCYTE [DISTWIDTH] IN BLOOD BY AUTOMATED COUNT: 14.1 % (ref 10–15)
HCT VFR BLD AUTO: 25.1 % (ref 35–47)
HGB BLD-MCNC: 8 G/DL (ref 11.7–15.7)
LYMPHOCYTES # BLD AUTO: 2.9 10E9/L (ref 0.8–5.3)
LYMPHOCYTES NFR BLD AUTO: 34.8 %
MCH RBC QN AUTO: 31.6 PG (ref 26.5–33)
MCHC RBC AUTO-ENTMCNC: 31.9 G/DL (ref 31.5–36.5)
MCV RBC AUTO: 99 FL (ref 78–100)
MONOCYTES # BLD AUTO: 0.7 10E9/L (ref 0–1.3)
MONOCYTES NFR BLD AUTO: 8.5 %
NEUTROPHILS # BLD AUTO: 4.5 10E9/L (ref 1.6–8.3)
NEUTROPHILS NFR BLD AUTO: 53.4 %
PLATELET # BLD AUTO: 300 10E9/L (ref 150–450)
RBC # BLD AUTO: 2.53 10E12/L (ref 3.8–5.2)
VIT B12 SERPL-MCNC: 1080 PG/ML (ref 193–986)
WBC # BLD AUTO: 8.4 10E9/L (ref 4–11)

## 2018-05-07 PROCEDURE — 99214 OFFICE O/P EST MOD 30 MIN: CPT | Performed by: NURSE PRACTITIONER

## 2018-05-07 PROCEDURE — 83550 IRON BINDING TEST: CPT | Performed by: NURSE PRACTITIONER

## 2018-05-07 PROCEDURE — 82728 ASSAY OF FERRITIN: CPT | Performed by: NURSE PRACTITIONER

## 2018-05-07 PROCEDURE — 36415 COLL VENOUS BLD VENIPUNCTURE: CPT | Performed by: NURSE PRACTITIONER

## 2018-05-07 PROCEDURE — 85025 COMPLETE CBC W/AUTO DIFF WBC: CPT | Performed by: NURSE PRACTITIONER

## 2018-05-07 PROCEDURE — 83540 ASSAY OF IRON: CPT | Performed by: NURSE PRACTITIONER

## 2018-05-07 PROCEDURE — 82607 VITAMIN B-12: CPT | Performed by: NURSE PRACTITIONER

## 2018-05-07 NOTE — PROGRESS NOTES
SUBJECTIVE:   Angeli Jacobson is a 68 year old female who presents to clinic with her  today for the following health issues:    ED/UC Followup:    Facility:  [facility]  Date of visit: 5/5/2018  Reason for visit: swelling   Current Status: some improvement     Patient visited [facility] on 05/05 for bruising over her right thigh and subsequent swelling to the same area. Four days prior to ED visit she had a right femoral vein graft occlusion thrombectomy and repair. Lab results showed low hemoglobin, 7.9, while at the ED. Denies history of anemia.   Today, she reports fatigue and tenderness around incision. Swelling of right thigh has reduced. Also reports right thigh bruising is nonpainful. Patient is taking 3 Tylenol 3 times per day  for pain. Continues on Xarelto BID. Denies dizziness. Of note, she follows up with surgical specialist Dr. Owens in 2 weeks.     Problem list and histories reviewed & adjusted, as indicated.  Additional history: as documented  Patient Active Problem List   Diagnosis     Pain in joint, multiple sites     MULTINODUL GOITER     Hearing loss     Hyperlipidemia LDL goal <160     Osteoporosis     Cervicalgia     Major depressive disorder, recurrent episode, moderate (H)     Impaired fasting glucose     Advanced directives, counseling/discussion     Lymphedema of left leg     Tubular adenoma     Other constipation     Vertigo     Myxoid liposarcoma (HCC)     PAD (peripheral artery disease) (H)     Vascular graft occlusion (H)     Past Surgical History:   Procedure Laterality Date     C APPENDECTOMY      Appendectomy     C NONSPECIFIC PROCEDURE  04/2000    Open Biopsy Left Thigh Liposarcoma     COLONOSCOPY N/A 2/5/2016    Procedure: COMBINED COLONOSCOPY, SINGLE OR MULTIPLE BIOPSY/POLYPECTOMY BY BIOPSY;  Surgeon: Varun Stanley MD, MD;  Location: [facility] GI     EXCISION MALIG LESION>1.25CM  5/2000    Myxoid Liposarcoma       EXPLORE GROIN Right 5/1/2018     Procedure: EXPLORE GROIN;  EMERGENCY RIGHT FEMORAL EXPLORATION WITH FEMORAL ARTERY REPAIR.    EBL: 50mL;  Surgeon: Shade Owens MD;  Location: [facility] OR     HC COLONOSCOPY THRU STOMA, DIAGNOSTIC      due      HC COLP CERVIX/UPPER VAGINA  1997    Negative     HC DILATION/CURETTAGE DIAG/THER NON OB  1997    Post menopausal bleeding on HRT, negative       Social History   Substance Use Topics     Smoking status: Never Smoker     Smokeless tobacco: Never Used     Alcohol use No     Family History   Problem Relation Age of Onset     C.A.D. Father      MI 57     Alcohol/Drug Father      etoh     Obesity Mother      OSTEOPOROSIS Mother      Colon Cancer Brother 70     Hyperlipidemia Son      Hyperlipidemia Son      very high, experimental drug     C.A.D. Paternal Grandmother      ascvd     DIABETES Maternal Grandmother      CANCER Maternal Grandmother      C.A.D. Paternal Uncle      Mi  age 48     CANCER Maternal Aunt      pancreatic CA         Current Outpatient Prescriptions   Medication Sig Dispense Refill     alendronate (FOSAMAX) 70 MG tablet Take 1 tablet (70 mg) by mouth with 8oz water every 7 days 30 minutes before breakfast and remain upright during this time. 12 tablet 3     aspirin 81 MG tablet Take 1 tablet by mouth daily.  3     buPROPion (WELLBUTRIN XL) 300 MG 24 hr tablet Take 1 tablet (300 mg) by mouth every morning 90 tablet 3     ezetimibe (ZETIA) 10 MG tablet Take 0.5 tablets (5 mg) by mouth daily 90 tablet 1     fluticasone (FLONASE) 50 MCG/ACT spray Spray 1-2 sprays into both nostrils daily 1 Bottle 11     MULTI-VITAMIN OR TABS 1 TABLET DAILY 30 prn     ORDER FOR DME Equipment being ordered: lymphedema bandages 2 Device 1     order for DME Equipment being ordered: Left Thigh High Compression Stocking, 550 CCL.3 1 each 99     oxyCODONE IR (ROXICODONE) 5 MG tablet Take 1-2 tablets (5-10 mg) by mouth every 4 hours as needed for moderate to severe pain 14 tablet 0     rivaroxaban  ANTICOAGULANT (XARELTO) 15 MG TABS tablet Take 1 tablet (15 mg) by mouth 2 times daily (with meals) 41 tablet 0     rivaroxaban ANTICOAGULANT (XARELTO) 20 MG TABS tablet Take 1 tablet (20 mg) by mouth daily (with dinner) Start after completing 15 mg tablets 90 tablet 1     rosuvastatin (CRESTOR) 40 MG tablet Take 1 tablet (40 mg) by mouth At Bedtime 90 tablet 3     Allergies   Allergen Reactions     Celexa [Citalopram Hydrobromide]      Decreased libido       Reviewed and updated as needed this visit by clinical staff       Reviewed and updated as needed this visit by Provider         ROS:  Positive for bruising   Constitutional,  cardiovascular, pulmonary, neuro, skin, systems are negative, except as otherwise noted.    This document serves as a record of the services and decisions personally performed and made by Susan Haase, CNP. It was created on her behalf by Paty Jackson, a trained medical scribe. The creation of this document is based on the provider's statements to the medical scribe.  Paty Jackson 1:16 PM May 7, 2018  OBJECTIVE:   /80 (BP Location: Right arm, Patient Position: Chair, Cuff Size: Adult Regular)  Pulse 68  Temp 97.8  F (36.6  C) (Oral)  Resp 12  Wt 70.3 kg (155 lb)  LMP 03/18/2005  BMI 24.64 kg/m2  Body mass index is 24.64 kg/(m^2).  GENERAL: healthy, alert and no distress  RESP: lungs clear to auscultation - no rales, rhonchi or wheezes  CV: regular rate and rhythm, normal S1 S2, no S3 or S4, no murmur, click or rub, non pitting edema of RLE, bilateral peripheral pulses strong  SKIN: extensive ecchymosis of bilateral groin, right thigh, right groin incision clean, dry, and intact  PSYCH: mentation appears normal, affect normal/bright    ASSESSMENT/PLAN:   Angeli was seen today for er f/u.    Diagnoses and all orders for this visit:    Anemia, unspecified type;  hgb pending, level of 7.9 likely due to blood loss during the procedure.  Will check Iron, ferritin and B12 levels to  ensure this isn't contributing.    -     CBC with platelets differential  -     Iron and iron binding capacity  -     Ferritin  -     Vitamin B12    Called patient with hgb level of 8, other results will be posted on [employer].    Follow up in 4 weeks with PCP, sooner as needed.    The information in this document, created by the medical scribe for me, accurately reflects the services I personally performed and the decisions made by me. I have reviewed and approved this document for accuracy prior to leaving the patient care area.  May 7, 2018 1:23 PM  Susan Haase, APRN [facility]

## 2018-05-07 NOTE — MR AVS SNAPSHOT
After Visit Summary   5/7/2018    Angeli Jacobson    MRN: [id number]           Patient Information     Date Of Birth          1950        Visit Information        Provider Department      5/7/2018 1:00 PM Haase, Susan Rachele, APRN CNP [facility]        Today's Diagnoses     Anemia, unspecified type    -  1       Follow-ups after your visit        Follow-up notes from your care team     Return if symptoms worsen or fail to improve.      Your next 10 appointments already scheduled     May 17, 2018  1:00 PM CDT   Return Visit with Shade Owens MD   [facility]    [address] [facility] [address]  [facility] [place] [address] [phone]              Who to contact     If you have questions or need follow up information about today's clinic visit or your schedule please contact [facility] directly at [phone].  Normal or non-critical lab and imaging results will be communicated to you by MyChart, letter or phone within 4 business days after the clinic has received the results. If you do not hear from us within 7 days, please contact the clinic through Mobile Accordhart or phone. If you have a critical or abnormal lab result, we will notify you by phone as soon as possible.  Submit refill requests through Digital Railroad or call your pharmacy and they will forward the refill request to us. Please allow 3 business days for your refill to be completed.          Additional Information About Your Visit        MyChart Information     Digital Railroad gives you secure access to your electronic health record. If you see a primary care provider, you can also send messages to your care team and make appointments. If you have questions, please call your primary care clinic.  If you do not have a primary care provider, please call [phone] and they will assist you.        Care EveryWhere ID     This is your Care EveryWhere ID. This  could be used by other organizations to access your [place] medical records  [id number]        Your Vitals Were     Pulse Temperature Respirations Last Period BMI (Body Mass Index)       68 97.8  F (36.6  C) (Oral) 12 03/18/2005 24.64 kg/m2        Blood Pressure from Last 3 Encounters:   05/07/18 122/80   05/05/18 139/77   05/03/18 121/62    Weight from Last 3 Encounters:   05/07/18 155 lb (70.3 kg)   05/05/18 148 lb (67.1 kg)   04/30/18 151 lb (68.5 kg)              We Performed the Following     CBC with platelets differential     Ferritin     Iron and iron binding capacity     Vitamin B12        Primary Care Provider Office Phone # Fax #    Sepideh Burris PA-C [phone] [address] [facility] [address]        Equal Access to Services     DAI OCASIO : Hadii aad ku hadasho Soangel, waaxda luqadaha, qaybta kaalmada adeegyada, radames hatch . So [facility] [phone].    ATENCIÓN: Si habla español, tiene a carver disposición servicios gratuitos de asistencia lingüística. Llame al [phone].    We comply with applicable federal civil rights laws and [place] laws. We do not discriminate on the basis of race, color, national origin, age, disability, sex, sexual orientation, or gender identity.            Thank you!     Thank you for choosing [facility]  for your care. Our goal is always to provide you with excellent care. Hearing back from our patients is one way we can continue to improve our services. Please take a few minutes to complete the written survey that you may receive in the mail after your visit with us. Thank you!             Your Updated Medication List - Protect others around you: Learn how to safely use, store and throw away your medicines at www.disposemymeds.org.          This list is accurate as of 5/7/18  1:29 PM.  Always use your most recent med list.                   Brand Name Dispense Instructions for use Diagnosis     alendronate 70 MG tablet    FOSAMAX    12 tablet    Take 1 tablet (70 mg) by mouth with 8oz water every 7 days 30 minutes before breakfast and remain upright during this time.    Age-related osteoporosis without current pathological fracture       aspirin 81 MG tablet      Take 1 tablet by mouth daily.    Mixed hyperlipidemia       buPROPion 300 MG 24 hr tablet    WELLBUTRIN XL    90 tablet    Take 1 tablet (300 mg) by mouth every morning    Major depression in complete remission (H)       ezetimibe 10 MG tablet    ZETIA    90 tablet    Take 0.5 tablets (5 mg) by mouth daily    Hyperlipidemia LDL goal <70       fluticasone 50 MCG/ACT spray    FLONASE    1 Bottle    Spray 1-2 sprays into both nostrils daily    Seasonal allergic rhinitis, unspecified chronicity, unspecified trigger       Multi-vitamin Tabs tablet   Generic drug:  multivitamin, therapeutic with minerals     30    1 TABLET DAILY    Routine general medical examination at a health care facility       order for DME     2 Device    Equipment being ordered: lymphedema bandages    Personal history of malignant neoplasm of other site, Other lymphedema       order for DME     1 each    Equipment being ordered: Left Thigh High Compression Stocking, 550 CCL.3    Myxoid liposarcoma (H)       oxyCODONE IR 5 MG tablet    ROXICODONE    14 tablet    Take 1-2 tablets (5-10 mg) by mouth every 4 hours as needed for moderate to severe pain    Acute post-operative pain       * rivaroxaban ANTICOAGULANT 15 MG Tabs tablet    XARELTO    41 tablet    Take 1 tablet (15 mg) by mouth 2 times daily (with meals)    Vascular graft occlusion, initial encounter (H)       * rivaroxaban ANTICOAGULANT 20 MG Tabs tablet    XARELTO    90 tablet    Take 1 tablet (20 mg) by mouth daily (with dinner) Start after completing 15 mg tablets    Vascular graft occlusion, initial encounter (H)       rosuvastatin 40 MG tablet    CRESTOR    90 tablet    Take 1 tablet (40 mg) by mouth At Bedtime     Hyperlipidemia LDL goal <70       * Notice:  This list has 2 medication(s) that are the same as other medications prescribed for you. Read the directions carefully, and ask your doctor or other care provider to review them with you.

## 2018-05-08 ENCOUNTER — TELEPHONE (OUTPATIENT)
Dept: OTHER | Facility: CLINIC | Age: 68
End: 2018-05-08

## 2018-05-08 LAB
FERRITIN SERPL-MCNC: 77 NG/ML (ref 8–252)
IRON SATN MFR SERPL: 31 % (ref 15–46)
IRON SERPL-MCNC: 77 UG/DL (ref 35–180)
TIBC SERPL-MCNC: 248 UG/DL (ref 240–430)

## 2018-05-08 NOTE — TELEPHONE ENCOUNTER
Discussed in person with Dr. Owens, reviewed pt picture below. Dr. Owens recommended pt only light activity so as not to disrupt sutures, ice to affected area and elevate leg.     I explained this to pt, along with if cough/sneeze to apply pressure to groin site to splint area. If bleeding occurs and cannot control bleeding to call 911. If any fever, increased pain, redness, bulge, leg pain or discoloration occurs to get in to urgent care or ER. To call us with any concerns that arise. Pt notes understanding.     Fidelia Jane, CHERISEN, RN

## 2018-05-08 NOTE — PROGRESS NOTES
Raudel Suh,  As discussed on the phone your hemoglobin is now 8.  Your B12 level is back and it is great at 1080.  Sincerely,     Susan Haase, CNP

## 2018-05-08 NOTE — TELEPHONE ENCOUNTER
Pt is status post left leg angio with hemhorrage from right CFA access site, exploration of right femoral artery with right CFA repair on 5-1-18.     Are you having any issues with pain? See below.   Do you have any concerns about your incision? Bruising in right side down to knee wraps around right thigh to back and knee, bruising now yellowed.    Right groin, 8 stiches, rubbing on clothing.   If lift thigh to move on bed or try to keep it elevated, a lot of discomfort. No oozing. A little pufffy along stiches. Whole groin area red and purple. About the same since left hospital for tenderness and for size.   Pulses in feet positive bilateral by NP yesterday and pt notes feet coloring are normal, pt notes not reliable if getting a pulse, but no discoloration.   Uses Bandage compression on left leg.   Denies leg pain.   Do you have fever, nausea or vomiting? no  Do you have any additional concerns no.     Will discuss with Dr. Owens.     Fidelia Jane, BSN, RN

## 2018-05-08 NOTE — TELEPHONE ENCOUNTER
Pt is status post RIGHT FEMORAL EXPLORATION WITH FEMORAL ARTERY REPAIR on Dr. Owens with 5/1/18.    Called patient to discuss any concerns since discharge from hospital.  Nurse triage number given for pt to call if any concerns.  Patient is scheduled to have OV on 5/17/18 with Dr. Owens.    Nicol Buenrostro, CHERISEN, RN

## 2018-05-09 ENCOUNTER — TELEPHONE (OUTPATIENT)
Dept: OTHER | Facility: CLINIC | Age: 68
End: 2018-05-09

## 2018-05-09 NOTE — TELEPHONE ENCOUNTER
"Pt called back.  Pt had multiple questions.  Pt wanted to understand how artery was repaired.  Explained to pt the artery is repaired with suture that \"holds it together\". Pt wanted to know how long of a recovery she will have.  Explained to pt that she should remain doing only light activity and further evaluation of progress could be made at her post op appt, which is currently scheduled for 5/17/18.  Re-educated pt should she have any fever, increase pain, redness/bulge at incision site, or discoloration in leg, to seek urgent care or ER.  Pt verbalized understanding, had no further questions at this time.  Nicol Buenrostro, CHERISEN, RN    "

## 2018-05-09 NOTE — TELEPHONE ENCOUNTER
Attempted to reach pt.  No answer.  Provided direct phone number for pt to call back.  CHERISE JasonN, RN

## 2018-05-09 NOTE — TELEPHONE ENCOUNTER
Reason for call:  Symptom   Symptom or request: call back she has a lot of questions    Duration (how long have symptoms been present):   Have you been treated for this before? No    Additional comments:     Phone number to reach patient:  Home number on file [phone] (home)    Best Time:  Any time    Can we leave a detailed message on this number?  YES     Ange Barraza MA

## 2018-05-09 NOTE — PROGRESS NOTES
Raudel Suh,  Your iron and ferritin levels are normal.  Based on these results the cause of your low hemoglobin is from the blood loss during your procedure.  This level will slowly go back to normal.  I did speak with Sepideh Burris and she would like to see you back in clinic for a check up in about 2 weeks.  If you have any questions please do not hesitate to call the clinic.  Sincerely,  Susan Haase, CNP

## 2018-05-14 ENCOUNTER — TELEPHONE (OUTPATIENT)
Dept: OTHER | Facility: CLINIC | Age: 68
End: 2018-05-14

## 2018-05-14 NOTE — TELEPHONE ENCOUNTER
Reviewed concerns with Dr. Owens.  Per Dr. Owens, from a vascular standpoint, pt needs more time to recover.  Bruising down the leg is typical with gravity and increase in activity.  Dr. Owens reviewed imaging completed on 5/5/18.  Dr. Owens agreed with recommendations of elevating leg, applying ice to area as tolerated, and also alternating between Tylenol and Advil.  Advised pt if there is a sudden increase in pain, any discoloration in lower extremities, fever, to seek treatment at ED.    Pt verbalized understanding and had no further questions.      Nicol Buenrostro, BSN, RN

## 2018-05-14 NOTE — TELEPHONE ENCOUNTER
"Pt is s/p left leg angiogram for occluded SFA graft on 4/30/18 with Dr. Delgado, followed by an emergency right groin exploration with femoral artery repair on 4/30/18 with Dr. Owens.    Pt called with concerns about the amount of tenderness she is still having behind her right knee.  She states the bruising has continued down her leg.  Standing and walking without difficulty.   Denies swelling behind knee, no lump or hardened areas behind the knee, denies fever, denies any temperature change to touch    At the worst, she rates her pain 4 or 5 out of 10 when attempting to stretch. She states the area behind her knee has become more tender since Thursday.  Pt states she took Tylenol 3 last night which provided relief.      Venous LE US completed on 5/5/18:  \"FINDINGS:  Examination of the deep veins with graded compression and color flow Doppler with spectral wave form analysis shows no evidence of thrombus in the common femoral vein, femoral vein, popliteal vein or calf veins.  There is no venous insufficiency.     IMPRESSION: No evidence of deep venous thrombosis.\"    Arterial LE US completed on 5/5/18:  \"FINDINGS:  The right groin region is unremarkable. No pseudoaneurysm  is identified. No hematoma is seen.\"      Recommended pt elevate the right leg, apply ice to the area, and also alternate between Tylenol and Advil.  Pt verbalized  Understanding.  Pt has post op appt with Dr. Owens on 5/17/18.  Will discuss with Dr. Owens for any further recommendations.    Nicol Buenrostro, CHERISEN, RN    "

## 2018-05-16 ENCOUNTER — DOCUMENTATION ONLY (OUTPATIENT)
Dept: PHARMACY | Facility: CLINIC | Age: 68
End: 2018-05-16

## 2018-05-16 NOTE — PROGRESS NOTES
Prior Authorization  Name of drug: Xarelto 20mg  Date Initiated: 5/2/18  Date Completed: 5/2/18  Prior Auth Type: Other Tiering/Copay Exception     Status: Approved    Effective Date: 5/2/18-12/31/18  Copay: SHERRY Lerner Filled: Yes  Insurance: [employer] Part D  Case Number: [id number]  Submitted Via: Carline Victor CphT  [facility] Pharm[facility] Liaison  Liason Cell: [phone]

## 2018-05-16 NOTE — PROGRESS NOTES
Prior Authorization  Name of drug: Eliquis 5mg  Date Initiated: 5/2/18  Date Completed: 5/2/18  Prior Auth Type: Other Tiering/Copay Exception     Status: Approved    Effective Date: 5/2/18-12/31/18  Copay: SHERRY Lerner Filled: Yes  Insurance: [employer] Part D  Case Number: [id number]  Submitted Via: Carline Victor CphT  [facility] Pharmacy Liaison  Liason Cell: [phone]

## 2018-05-16 NOTE — PROGRESS NOTES
Prior Authorization  Name of drug: Xarelto 15mg  Date Initiated: 5/8/18  Date Completed: 5/8/18  Prior Auth Type: Other Tiering/Copay Exception     Status: Approved    Effective Date: 5/8/18-12/31/18  Copay: SHERRY Lerner Filled: Yes  Insurance: [employer] Part D  Case Number: [id number]  Submitted Via: Carline Victor CphT  [facility] Pharmacy Liaison  Liason Cell: [phone]

## 2018-05-17 ENCOUNTER — TELEPHONE (OUTPATIENT)
Dept: OTHER | Facility: CLINIC | Age: 68
End: 2018-05-17

## 2018-05-17 ENCOUNTER — OFFICE VISIT (OUTPATIENT)
Dept: SURGERY | Facility: CLINIC | Age: 68
End: 2018-05-17
Payer: COMMERCIAL

## 2018-05-17 VITALS
SYSTOLIC BLOOD PRESSURE: 110 MMHG | DIASTOLIC BLOOD PRESSURE: 70 MMHG | RESPIRATION RATE: 16 BRPM | HEIGHT: 67 IN | BODY MASS INDEX: 24.33 KG/M2 | OXYGEN SATURATION: 97 % | HEART RATE: 77 BPM | WEIGHT: 155 LBS

## 2018-05-17 DIAGNOSIS — Z09 SURGICAL FOLLOW-UP CARE: Primary | ICD-10-CM

## 2018-05-17 PROCEDURE — 99024 POSTOP FOLLOW-UP VISIT: CPT | Performed by: SURGERY

## 2018-05-17 NOTE — TELEPHONE ENCOUNTER
Reason for Call:  Other call back    Detailed comments: Patient saw Dr. Owens today and had stiches taken out is wondering if she should still elevate her leg she had surgery on above or at heart level and if so for how long should she do that.     Phone Number Patient can be reached at: Home number on file [phone] (home)    Best Time: Any    Can we leave a detailed message on this number? YES    Call taken on 5/17/2018 at 3:29 PM by Cathie Lo

## 2018-05-17 NOTE — LETTER
[facility] 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  She has history of chronic left leg lymphedema.  She presented to my office in late April of this year with a 2-1/2 month history of short distance left leg cramping.  I took her for angiography on 18.  I was able to easily cross the occluded PTFE interposition graft located in her proximal left SFA.  She underwent thrombolysis as well as stenting of the portion of the mid graft utilizing a Viabahn 6 mm covered stent.  She had issues with bleeding from her right femoral artery access site and she did require open repair of the right femoral artery.  During that hospitalization we opted to start her empirically on Xarelto. She presents today for her first postoperative check.  She complains of significant ecchymosis in the right leg which has settled down into her right calf region.  She's had some discomfort of the right popliteal fossa.  Apart from this she seems to be doing well.  She is back to wearing her custom left leg compression stocking for the lymphedema.  She is becoming more active and denies lower extremity claudication.     Exam:  The right groin incision is healing nicely and sutures were removed today.  Significant but resolving ecchymosis of the entire right thigh with extension down into the posterior right calf. All musculature is soft.  2+ palpable dorsalis pedis pulses bilaterally.     IMPRESSION:  2- 1/2 weeks status post thrombolysis of proximal left superficial femoral artery PTFE graft with stenting of the central portion of the graft and subsequent open repair of  right common femoral artery access site overall doing quite well.     PLAN:  I reviewed all the above with Mrs. Jacobson and her .  I have reassured her that the right leg ecchymosis is to be expected given the bleeding that occurred around the right femoral sheath.  She admits that it is improving.  She will continue her medical regimen which now includes Xarelto.  She will continue to utilize her left leg lymphedema stocking as per her baseline.  She will ambulate as much as possible.  Vascular surgical follow-up will be with me in 3 months for ultrasound of the left superficial femoral artery interposition graft and an ADRIAN with exercise.       Sincerely,     Elijah Owens M.D.

## 2018-05-17 NOTE — MR AVS SNAPSHOT
After Visit Summary   5/17/2018    Angeli Jacobson    MRN: [id number]           Patient Information     Date Of Birth          1950        Visit Information        Provider Department      5/17/2018 1:00 PM Shade Owens MD [facility]      Today's Diagnoses     Surgical follow-up care    -  1       Follow-ups after your visit        Follow-up notes from your care team     Return in about 3 months (around 8/17/2018) for Ultrasound of left femoral artery graft; ADRIAN with exercise.      Your next 10 appointments already scheduled     May 22, 2018 10:00 AM CDT   SHORT with Sepideh Burris PA-C   [facility]    [address] [place] [address] [phone]              Who to contact     If you have questions or need follow up information about today's clinic visit or your schedule please contact [facility] directly at [phone].  Normal or non-critical lab and imaging results will be communicated to you by Open Air Publishinghart, letter or phone within 4 business days after the clinic has received the results. If you do not hear from us within 7 days, please contact the clinic through Carmudit or phone. If you have a critical or abnormal lab result, we will notify you by phone as soon as possible.  Submit refill requests through Veeker or call your pharmacy and they will forward the refill request to us. Please allow 3 business days for your refill to be completed.          Additional Information About Your Visit        MyChart Information     Veeker gives you secure access to your electronic health record. If you see a primary care provider, you can also send messages to your care team and make appointments. If you have questions, please call your primary care clinic.  If you do not have a primary care provider, please call [phone] and they will  "assist you.        Care EveryWhere ID     This is your Care EveryWhere ID. This could be used by other organizations to access your [place] medical records  [id number]        Your Vitals Were     Pulse Respirations Height Last Period Pulse Oximetry Breastfeeding?    77 16 5' 6.5\" (1.689 m) 03/18/2005 97% No    BMI (Body Mass Index)                   24.64 kg/m2            Blood Pressure from Last 3 Encounters:   05/17/18 110/70   05/07/18 122/80   05/05/18 139/77    Weight from Last 3 Encounters:   05/17/18 155 lb (70.3 kg)   05/07/18 155 lb (70.3 kg)   05/05/18 148 lb (67.1 kg)              Today, you had the following     No orders found for display       Primary Care Provider Office Phone # Fax #    Sepideh Burris PA-C [phone] [address] [facility] [address]        Equal Access to Services     DAI [facility] HospitalMEGA : Hadii aad ku hadasho Soomaali, waaxda luqadaha, qaybta kaalmada adeegyada, waxay angellain haybettyn maritza hatch . So [facility] [phone].    ATENCIÓN: Si habla español, tiene a carver disposición servicios gratuitos de asistencia lingüística. Llame al [phone].    We comply with applicable federal civil rights laws and [place] laws. We do not discriminate on the basis of race, color, national origin, age, disability, sex, sexual orientation, or gender identity.            Thank you!     Thank you for choosing [facility]  for your care. Our goal is always to provide you with excellent care. Hearing back from our patients is one way we can continue to improve our services. Please take a few minutes to complete the written survey that you may receive in the mail after your visit with us. Thank you!             Your Updated Medication List - Protect others around you: Learn how to safely use, store and throw away your medicines at www.disposemymeds.org.          This list is accurate as of 5/17/18 11:59 PM.  Always use your most recent med list.       "             Brand Name Dispense Instructions for use Diagnosis    acetaminophen-codeine 300-30 MG per tablet    TYLENOL #3    12 tablet    Take 1 tablet by mouth every 6 hours as needed for severe pain maximum 6 tablet(s) per day    Vascular graft occlusion, initial encounter (H)       alendronate 70 MG tablet    FOSAMAX    12 tablet    Take 1 tablet (70 mg) by mouth with 8oz water every 7 days 30 minutes before breakfast and remain upright during this time.    Age-related osteoporosis without current pathological fracture       aspirin 81 MG tablet      Take 1 tablet by mouth daily.    Mixed hyperlipidemia       buPROPion 300 MG 24 hr tablet    WELLBUTRIN XL    90 tablet    Take 1 tablet (300 mg) by mouth every morning    Major depression in complete remission (H)       ezetimibe 10 MG tablet    ZETIA    90 tablet    Take 0.5 tablets (5 mg) by mouth daily    Hyperlipidemia LDL goal <70       fluticasone 50 MCG/ACT spray    FLONASE    1 Bottle    Spray 1-2 sprays into both nostrils daily    Seasonal allergic rhinitis, unspecified chronicity, unspecified trigger       Multi-vitamin Tabs tablet   Generic drug:  multivitamin, therapeutic with minerals     30    1 TABLET DAILY    Routine general medical examination at a health care facility       order for DME     2 Device    Equipment being ordered: lymphedema bandages    Personal history of malignant neoplasm of other site, Other lymphedema       order for DME     1 each    Equipment being ordered: Left Thigh High Compression Stocking, 550 CCL.3    Myxoid liposarcoma (H)       * rivaroxaban ANTICOAGULANT 15 MG Tabs tablet    XARELTO    41 tablet    Take 1 tablet (15 mg) by mouth 2 times daily (with meals)    Vascular graft occlusion, initial encounter (H)       * rivaroxaban ANTICOAGULANT 20 MG Tabs tablet    XARELTO    90 tablet    Take 1 tablet (20 mg) by mouth daily (with dinner) Start after completing 15 mg tablets    Vascular graft occlusion, initial encounter  (H)       rosuvastatin 40 MG tablet    CRESTOR    90 tablet    Take 1 tablet (40 mg) by mouth At Bedtime    Hyperlipidemia LDL goal <70       * Notice:  This list has 2 medication(s) that are the same as other medications prescribed for you. Read the directions carefully, and ask your doctor or other care provider to review them with you.

## 2018-05-17 NOTE — TELEPHONE ENCOUNTER
"Called pt back.  Pt reported swelling has decreased in her leg.  Encouraged pt to continue elevating leg as needed and she won't \"hurt\" anything by elevating her legs, even if there is no visible swelling.  Pt verbalized understanding and had no further questions at this time.  Nicol Buenrostro, CHERISEN, RN    "

## 2018-05-18 DIAGNOSIS — I73.9 PAD (PERIPHERAL ARTERY DISEASE) (H): Primary | ICD-10-CM

## 2018-05-18 NOTE — PROGRESS NOTES
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  She has history of chronic left leg lymphedema.  She presented to my office in late April of this year with a 2-1/2 month history of short distance left leg cramping.  I took her for angiography on 4/30/18.  I was able to easily cross the occluded PTFE interposition graft located in her proximal left SFA.  She underwent thrombolysis as well as stenting of the portion of the mid graft utilizing a Viabahn 6 mm covered stent.  She had issues with bleeding from her right femoral artery access site and she did require open repair of the right femoral artery.  During that hospitalization we opted to start her empirically on Xarelto.  She presents today for her first postoperative check.  She complains of significant ecchymosis in the right leg which has settled down into her right calf region.  She's had some discomfort of the right popliteal fossa.  Apart from this she seems to be doing well.  She is back to wearing her custom left leg compression stocking for the lymphedema.  She is becoming more active and denies lower extremity claudication.    Exam:  The right groin incision is healing nicely and sutures were removed today.  Significant but resolving ecchymosis of the entire right thigh with extension down into the posterior right calf.  All musculature is soft.  2+ palpable dorsalis pedis pulses bilaterally.    IMPRESSION:  2- 1/2 weeks status post thrombolysis of proximal left superficial femoral artery PTFE graft with stenting of the central portion of the graft and subsequent open repair of right common femoral artery access site overall doing quite well.    PLAN:  I reviewed all the above with Mrs. Jacobson and her .  I have reassured her that the right leg ecchymosis is to be  expected given the bleeding that occurred around the right femoral sheath.  She admits that it is improving.  She will continue her medical regimen which now includes Xarelto.  She will continue to utilize her left leg lymphedema stocking as per her baseline.  She will ambulate as much as possible.  Vascular surgical follow-up will be with me in 3 months for ultrasound of the left superficial femoral artery interposition graft and an ADRIAN with exercise.    Elijha Owens M.D.

## 2018-05-22 ENCOUNTER — OFFICE VISIT (OUTPATIENT)
Dept: FAMILY MEDICINE | Facility: CLINIC | Age: 68
End: 2018-05-22
Payer: COMMERCIAL

## 2018-05-22 VITALS
DIASTOLIC BLOOD PRESSURE: 80 MMHG | SYSTOLIC BLOOD PRESSURE: 120 MMHG | WEIGHT: 154 LBS | BODY MASS INDEX: 24.48 KG/M2 | HEART RATE: 72 BPM | TEMPERATURE: 97.7 F | RESPIRATION RATE: 14 BRPM

## 2018-05-22 DIAGNOSIS — D64.9 LOW HEMOGLOBIN: ICD-10-CM

## 2018-05-22 DIAGNOSIS — E78.5 HYPERLIPIDEMIA LDL GOAL <70: ICD-10-CM

## 2018-05-22 DIAGNOSIS — F32.5 MAJOR DEPRESSION IN COMPLETE REMISSION (H): Primary | ICD-10-CM

## 2018-05-22 DIAGNOSIS — T82.898A VASCULAR GRAFT OCCLUSION, INITIAL ENCOUNTER (H): ICD-10-CM

## 2018-05-22 LAB
ERYTHROCYTE [DISTWIDTH] IN BLOOD BY AUTOMATED COUNT: 15.5 % (ref 10–15)
HCT VFR BLD AUTO: 36 % (ref 35–47)
HGB BLD-MCNC: 11.3 G/DL (ref 11.7–15.7)
MCH RBC QN AUTO: 31.6 PG (ref 26.5–33)
MCHC RBC AUTO-ENTMCNC: 31.4 G/DL (ref 31.5–36.5)
MCV RBC AUTO: 101 FL (ref 78–100)
PLATELET # BLD AUTO: 318 10E9/L (ref 150–450)
RBC # BLD AUTO: 3.58 10E12/L (ref 3.8–5.2)
WBC # BLD AUTO: 5.2 10E9/L (ref 4–11)

## 2018-05-22 PROCEDURE — 85027 COMPLETE CBC AUTOMATED: CPT | Performed by: PHYSICIAN ASSISTANT

## 2018-05-22 PROCEDURE — 99214 OFFICE O/P EST MOD 30 MIN: CPT | Performed by: PHYSICIAN ASSISTANT

## 2018-05-22 PROCEDURE — 36415 COLL VENOUS BLD VENIPUNCTURE: CPT | Performed by: PHYSICIAN ASSISTANT

## 2018-05-22 RX ORDER — EZETIMIBE 10 MG/1
5 TABLET ORAL DAILY
Qty: 90 TABLET | Refills: 1 | Status: SHIPPED | OUTPATIENT
Start: 2018-05-22 | End: 2018-08-28

## 2018-05-22 RX ORDER — ROSUVASTATIN CALCIUM 40 MG/1
40 TABLET, COATED ORAL AT BEDTIME
Qty: 90 TABLET | Refills: 1 | Status: SHIPPED | OUTPATIENT
Start: 2018-05-22 | End: 2018-08-28

## 2018-05-22 RX ORDER — BUPROPION HYDROCHLORIDE 300 MG/1
300 TABLET ORAL EVERY MORNING
Qty: 90 TABLET | Refills: 1 | Status: SHIPPED | OUTPATIENT
Start: 2018-05-22 | End: 2018-08-28

## 2018-05-22 NOTE — PROGRESS NOTES
SUBJECTIVE:   Angeli Jacobson is a 68 year old female who presents to clinic today for the following health issues:      Hyperlipidemia Follow-Up      Rate your low fat/cholesterol diet?: good    Taking statin?  Yes, no muscle aches from statin    Other lipid medications/supplements?:  Zetia, without side effects    Depression Followup    Status since last visit: Stable     See PHQ-9 for current symptoms.  Other associated symptoms: None    Complicating factors:   Significant life event:  No   Current substance abuse:  None  Anxiety or Panic symptoms:  No    PHQ-9 2/7/2017 8/24/2017 4/13/2018   Total Score 3 4 3   Q9: Suicide Ideation Not at all Not at all Not at all     In the past two weeks have you had thoughts of suicide or self-harm?  No.    Do you have concerns about your personal safety or the safety of others?   No  PHQ-9  English  PHQ-9   Any Language  Suicide Assessment Five-step Evaluation and Treatment (SAFE-T)      Needs repeat hemoglobin. Was low after surgery.    Still taking Xarleto            Problem list and histories reviewed & adjusted, as indicated.  Additional history: as documented    Patient Active Problem List   Diagnosis     Pain in joint, multiple sites     MULTINODUL GOITER     Hearing loss     Hyperlipidemia LDL goal <70     Osteoporosis     Cervicalgia     Major depressive disorder, recurrent episode, moderate (H)     Impaired fasting glucose     Advanced directives, counseling/discussion     Lymphedema of left leg     Tubular adenoma     Other constipation     Vertigo     Myxoid liposarcoma (HCC)     PAD (peripheral artery disease) (H)     Vascular graft occlusion (H)     Past Surgical History:   Procedure Laterality Date     C APPENDECTOMY      Appendectomy     C NONSPECIFIC PROCEDURE  04/2000    Open Biopsy Left Thigh Liposarcoma     COLONOSCOPY N/A 2/5/2016    Procedure: COMBINED COLONOSCOPY, SINGLE OR MULTIPLE BIOPSY/POLYPECTOMY BY BIOPSY;  Surgeon: Varun Stanley MD, MD;   Location: [facility]     EXCISION MALIG LESION>1.25CM  2000    Myxoid Liposarcoma       EXPLORE GROIN Right 2018    Procedure: EXPLORE GROIN;  EMERGENCY RIGHT FEMORAL EXPLORATION WITH FEMORAL ARTERY REPAIR.    EBL: 50mL;  Surgeon: Shade Owens MD;  Location: [facility]     HC COLONOSCOPY THRU STOMA, DIAGNOSTIC      due      HC COLP CERVIX/UPPER VAGINA  1997    Negative     HC DILATION/CURETTAGE DIAG/THER NON OB  1997    Post menopausal bleeding on HRT, negative       Social History   Substance Use Topics     Smoking status: Never Smoker     Smokeless tobacco: Never Used     Alcohol use No     Family History   Problem Relation Age of Onset     C.A.D. Father      MI 57     Alcohol/Drug Father      etoh     Obesity Mother      OSTEOPOROSIS Mother      Colon Cancer Brother 70     Hyperlipidemia Son      Hyperlipidemia Son      very high, experimental drug     C.A.D. Paternal Grandmother      ascvd     DIABETES Maternal Grandmother      CANCER Maternal Grandmother      C.A.D. Paternal Uncle      Mi  age 48     CANCER Maternal Aunt      pancreatic CA           Reviewed and updated as needed this visit by clinical staff  Tobacco  Allergies  Meds  Med Hx  Surg Hx  Fam Hx  Soc Hx      Reviewed and updated as needed this visit by Provider         ROS:  Constitutional, HEENT, cardiovascular, pulmonary, gi and gu systems are negative, except as otherwise noted.    OBJECTIVE:     /80 (BP Location: Right arm, Patient Position: Chair, Cuff Size: Adult Regular)  Pulse 72  Temp 97.7  F (36.5  C) (Oral)  Resp 14  Wt 154 lb (69.9 kg)  LMP 2005  BMI 24.48 kg/m2  Body mass index is 24.48 kg/(m^2).  GENERAL APPEARANCE: healthy, alert and no distress  RESP: lungs clear to auscultation - no rales, rhonchi or wheezes  CV: regular rates and rhythm, normal S1 S2, no S3 or S4 and no murmur, click or rub  PSYCH: mentation appears normal and affect normal/bright        ASSESSMENT/PLAN:              1. Major depression in complete remission (H)  Stable. F/u 6 months-1 year  - buPROPion (WELLBUTRIN XL) 300 MG 24 hr tablet; Take 1 tablet (300 mg) by mouth every morning  Dispense: 90 tablet; Refill: 1    2. Vascular graft occlusion, initial encounter (H)  Refilled, needed to be sent to mail-order  - rivaroxaban ANTICOAGULANT (XARELTO) 20 MG TABS tablet; Take 1 tablet (20 mg) by mouth daily (with dinner) Start after completing 15 mg tablets  Dispense: 90 tablet; Refill: 1    3. Low hemoglobin  Await labs.   - CBC with platelets    4. Hyperlipidemia LDL goal <70  Recheck lipids in 2 months.   - ezetimibe (ZETIA) 10 MG tablet; Take 0.5 tablets (5 mg) by mouth daily  Dispense: 90 tablet; Refill: 1  - rosuvastatin (CRESTOR) 40 MG tablet; Take 1 tablet (40 mg) by mouth At Bedtime  Dispense: 90 tablet; Refill: 1        Sepideh Burris PA-C  [facility]

## 2018-05-22 NOTE — MR AVS SNAPSHOT
After Visit Summary   5/22/2018    Angeli Jacobson    MRN: [id number]           Patient Information     Date Of Birth          1950        Visit Information        Provider Department      5/22/2018 10:00 AM Sepideh Burris PA-C [facility]        Today's Diagnoses     Need for prophylactic vaccination with tetanus-diphtheria (TD)    -  1       Follow-ups after your visit        Who to contact     If you have questions or need follow up information about today's clinic visit or your schedule please contact [facility] directly at [phone].  Normal or non-critical lab and imaging results will be communicated to you by e-Zassihart, letter or phone within 4 business days after the clinic has received the results. If you do not hear from us within 7 days, please contact the clinic through e-Zassihart or phone. If you have a critical or abnormal lab result, we will notify you by phone as soon as possible.  Submit refill requests through [employer] or call your pharmacy and they will forward the refill request to us. Please allow 3 business days for your refill to be completed.          Additional Information About Your Visit        MyChart Information     [employer] gives you secure access to your electronic health record. If you see a primary care provider, you can also send messages to your care team and make appointments. If you have questions, please call your primary care clinic.  If you do not have a primary care provider, please call [phone] and they will assist you.        Care EveryWhere ID     This is your Care EveryWhere ID. This could be used by other organizations to access your [place] medical records  [id number]        Your Vitals Were     Pulse Temperature Respirations Last Period BMI (Body Mass Index)       72 97.7  F (36.5  C) (Oral) 14 03/18/2005 24.48 kg/m2        Blood Pressure from Last 3 Encounters:   05/22/18 120/80   05/17/18 110/70    05/07/18 122/80    Weight from Last 3 Encounters:   05/22/18 154 lb (69.9 kg)   05/17/18 155 lb (70.3 kg)   05/07/18 155 lb (70.3 kg)              Today, you had the following     No orders found for display       Primary Care Provider Office Phone # Fax #    Sepidehaly Burris PA-C [phone] [address] [facility] [address]        Equal Access to Services     KERON OCASIO : Hadii aad ku hadasho Soomaali, waaxda luqadaha, qaybta kaalmada adeegyada, waxay idiin hayaan adeeg kharash la'kat . So [facility] [phone].    ATENCIÓN: Si habla español, tiene a carver disposición servicios gratuitos de asistencia lingüística. [facility] [phone].    We comply with applicable federal civil rights laws and [place] laws. We do not discriminate on the basis of race, color, national origin, age, disability, sex, sexual orientation, or gender identity.            Thank you!     Thank you for choosing [facility]  for your care. Our goal is always to provide you with excellent care. Hearing back from our patients is one way we can continue to improve our services. Please take a few minutes to complete the written survey that you may receive in the mail after your visit with us. Thank you!             Your Updated Medication List - Protect others around you: Learn how to safely use, store and throw away your medicines at www.disposemymeds.org.          This list is accurate as of 5/22/18 10:09 AM.  Always use your most recent med list.                   Brand Name Dispense Instructions for use Diagnosis    acetaminophen-codeine 300-30 MG per tablet    TYLENOL #3    12 tablet    Take 1 tablet by mouth every 6 hours as needed for severe pain maximum 6 tablet(s) per day    Vascular graft occlusion, initial encounter (H)       alendronate 70 MG tablet    FOSAMAX    12 tablet    Take 1 tablet (70 mg) by mouth with 8oz water every 7 days 30 minutes before breakfast and remain upright during  this time.    Age-related osteoporosis without current pathological fracture       aspirin 81 MG tablet      Take 1 tablet by mouth daily.    Mixed hyperlipidemia       buPROPion 300 MG 24 hr tablet    WELLBUTRIN XL    90 tablet    Take 1 tablet (300 mg) by mouth every morning    Major depression in complete remission (H)       ezetimibe 10 MG tablet    ZETIA    90 tablet    Take 0.5 tablets (5 mg) by mouth daily    Hyperlipidemia LDL goal <70       fluticasone 50 MCG/ACT spray    FLONASE    1 Bottle    Spray 1-2 sprays into both nostrils daily    Seasonal allergic rhinitis, unspecified chronicity, unspecified trigger       Multi-vitamin Tabs tablet   Generic drug:  multivitamin, therapeutic with minerals     30    1 TABLET DAILY    Routine general medical examination at a health care facility       order for DME     2 Device    Equipment being ordered: lymphedema bandages    Personal history of malignant neoplasm of other site, Other lymphedema       order for DME     1 each    Equipment being ordered: Left Thigh High Compression Stocking, 550 CCL.3    Myxoid liposarcoma (H)       * rivaroxaban ANTICOAGULANT 15 MG Tabs tablet    XARELTO    41 tablet    Take 1 tablet (15 mg) by mouth 2 times daily (with meals)    Vascular graft occlusion, initial encounter (H)       * rivaroxaban ANTICOAGULANT 20 MG Tabs tablet    XARELTO    90 tablet    Take 1 tablet (20 mg) by mouth daily (with dinner) Start after completing 15 mg tablets    Vascular graft occlusion, initial encounter (H)       rosuvastatin 40 MG tablet    CRESTOR    90 tablet    Take 1 tablet (40 mg) by mouth At Bedtime    Hyperlipidemia LDL goal <70       * Notice:  This list has 2 medication(s) that are the same as other medications prescribed for you. Read the directions carefully, and ask your doctor or other care provider to review them with you.

## 2018-08-01 ENCOUNTER — OFFICE VISIT (OUTPATIENT)
Dept: SURGERY | Facility: CLINIC | Age: 68
End: 2018-08-01
Attending: SURGERY
Payer: COMMERCIAL

## 2018-08-01 ENCOUNTER — HOSPITAL ENCOUNTER (OUTPATIENT)
Dept: ULTRASOUND IMAGING | Facility: CLINIC | Age: 68
Discharge: HOME | End: 2018-08-01
Attending: SURGERY | Admitting: SURGERY
Payer: MEDICARE

## 2018-08-01 ENCOUNTER — HOSPITAL ENCOUNTER (OUTPATIENT)
Dept: ULTRASOUND IMAGING | Facility: CLINIC | Age: 68
End: 2018-08-01
Attending: SURGERY
Payer: MEDICARE

## 2018-08-01 VITALS
WEIGHT: 154 LBS | DIASTOLIC BLOOD PRESSURE: 68 MMHG | BODY MASS INDEX: 24.17 KG/M2 | OXYGEN SATURATION: 95 % | RESPIRATION RATE: 16 BRPM | HEART RATE: 76 BPM | SYSTOLIC BLOOD PRESSURE: 110 MMHG | HEIGHT: 67 IN

## 2018-08-01 DIAGNOSIS — Z95.820 STATUS POST PERIPHERAL ARTERY ANGIOPLASTY WITH INSERTION OF STENT: Primary | ICD-10-CM

## 2018-08-01 DIAGNOSIS — I73.9 PAD (PERIPHERAL ARTERY DISEASE) (H): ICD-10-CM

## 2018-08-01 DIAGNOSIS — Z95.828 H/O ARTERIAL BYPASS OF LOWER LIMB: ICD-10-CM

## 2018-08-01 PROCEDURE — 93924 LWR XTR VASC STDY BILAT: CPT

## 2018-08-01 PROCEDURE — 93926 LOWER EXTREMITY STUDY: CPT | Mod: LT

## 2018-08-01 PROCEDURE — 99213 OFFICE O/P EST LOW 20 MIN: CPT | Performed by: SURGERY

## 2018-08-01 NOTE — LETTER
[facility] [address]  [place] [address]  Phone: [phone]  Fax: [phone]      2018    Re: Angeli Jacobson - 1950    Angeli Jacobson is a 68-year-old female who is status post resection of a proximal left thigh sarcoma with replacement of a portion of her proximal left superficial femoral artery utilizing a 6 mm ringed PTFE graft in .  She has history of chronic left leg lymphedema.  She presented to my office in late April of this year with a 2-1/2 month history of short distance left leg cramping.  I took her for angiography on 18.  I was able to easily cross the occluded PTFE interposition graft located in her proximal left SFA.  She underwent thrombolysis as well as stenting of the portion of the mid graft utilizing a Viabahn 6 mm covered stent.  She had issues with bleeding from her right femoral artery access site and she did require open repair of the right femoral artery.  During that hospitalization we opted to start her empirically on Xarelto.     She presents today for a 3 month postoperative check.  She denies claudication.  She is back to wearing her custom left leg compression stocking for lymphedema.  At today's visit she was without complaints.     Exam:  Well-developed female in no acute distress.  Vital signs reviewed.  Right thigh ecchymosis completely resolved.  Moderate chronic lymphedema of the left lower extremity.  She is wearing her custom compression stocking today.  2+ palpable dorsalis pedis pulses bilaterally.     Imaging:  US LOWER EXTREMITY ARTERIAL DUPLEX LEFT   2018 2:45 PM       HISTORY: History of thrombolysis and stenting of proximal left SFA  PTFE graft on 2018. PAD (peripheral artery disease) (H).      COMPARISON: 2018.      FINDINGS: Color Doppler and spectral waveform analysis was performed  throughout the left proximal superficial femoral artery PTFE graft and  stent. The  graft is patent. Diameters range between 5.1 to 6.6 cm.  Inflow and outflow arteries are patent. There is a stent in the  proximal bypass. Stent is patent with diameters ranging between 5.2  and 5.3 mm. Inflow and outflow is patent.        IMPRESSION: Patent left lower extremity bypass graft and stent in the  proximal bypass.      GEORGE HOLLEY, DO     ADRIAN DOPPLER WITH EXERCISE BILATERAL  8/1/2018 2:35 PM       HISTORY: History of thrombolysis and stenting of proximal left SFA  PTFE graft on 5/1/18; PAD (peripheral artery disease) (H)      COMPARISON: Sandra gram dated 5/1/2018 and ankle-brachial indices  dated 4/20/2018      FINDINGS:   The patient walked on a treadmill for 5 minutes at a 10% grade and at  a speed of 1.5 miles per hour      Patient had no leg symptoms with exercise.      The resting and exercise ADRIAN on the right are 1.34 and 1.19  respectively versus 1.21 and 1.21 on the prior exam.      The resting and exercise ADRIAN on the left are  1.16 and 1.08  respectively versus 0.59 and 0.39 on the prior exam.      Waveform analysis indicates multiphasic waveforms in the distal tibial  arteries        IMPRESSION: Normal ABIs.         ASSESSMENT:  18 years status post reconstruction of proximal left superficial femoral artery utilizing a 6 mm PTFE graft now 3 months status post successful graft thrombolysis with placement of a 6 mm covered stent within the bypass graft.  Clinically doing well with palpable pedal pulses and normalized ABIs.     RECOMMENDATION:  I had a nice discussion with Mrs. Jacobson reviewing all the above.  She has numerous questions regarding her medications.  She has an upcoming appointment with her primary care physician for a recheck of her lipid panel.  Currently she is on empiric Xarelto due to the recent graft thrombosis.  For now, I prefer to continue the Xarelto.  Vascular surgical follow-up will be with me in 6 months for repeat left leg graft ultrasound and an ADRIAN with  exercise.          Elijah Owens MD

## 2018-08-01 NOTE — MR AVS SNAPSHOT
After Visit Summary   8/1/2018    Angeli Jacobson    MRN: [id number]           Patient Information     Date Of Birth          1950        Visit Information        Provider Department      8/1/2018 3:00 PM Shade Owens MD [facility]      Today's Diagnoses     Status post peripheral artery angioplasty with insertion of stent    -  1    H/O arterial bypass of lower limb           Follow-ups after your visit        Follow-up notes from your care team     Return in about 6 months (around 2/1/2019) for Ultrasound of left leg bypass graft; ADRIAN with exercise.      Your next 10 appointments already scheduled     Aug 02, 2018  9:00 AM CDT   LAB with CR LAB   [facility] ([facility])    [address] [place] [address] [phone]           Please do not eat 10-12 hours before your appointment if you are coming in fasting for labs on lipids, cholesterol, or glucose (sugar). This does not apply to pregnant women. Water, hot tea and black coffee (with nothing added) are okay. Do not drink other fluids, diet soda or chew gum.              Who to contact     If you have questions or need follow up information about today's clinic visit or your schedule please contact [facility] directly at [phone].  Normal or non-critical lab and imaging results will be communicated to you by MyChart, letter or phone within 4 business days after the clinic has received the results. If you do not hear from us within 7 days, please contact the clinic through MyChart or phone. If you have a critical or abnormal lab result, we will notify you by phone as soon as possible.  Submit refill requests through [employer] or call your pharmacy and they will forward the refill request to us. Please allow 3 business days for your refill to be completed.          Additional Information About  "Your Visit        MyChart Information     [employer] gives you secure access to your electronic health record. If you see a primary care provider, you can also send messages to your care team and make appointments. If you have questions, please call your primary care clinic.  If you do not have a primary care provider, please call [phone] and they will assist you.        Care EveryWhere ID     This is your Care EveryWhere ID. This could be used by other organizations to access your [place] medical records  [id number]        Your Vitals Were     Pulse Respirations Height Last Period Pulse Oximetry Breastfeeding?    76 16 5' 6.5\" (1.689 m) 03/18/2005 95% No    BMI (Body Mass Index)                   24.48 kg/m2            Blood Pressure from Last 3 Encounters:   08/01/18 110/68   05/22/18 120/80   05/17/18 110/70    Weight from Last 3 Encounters:   08/01/18 154 lb (69.9 kg)   05/22/18 154 lb (69.9 kg)   05/17/18 155 lb (70.3 kg)              Today, you had the following     No orders found for display       Primary Care Provider Office Phone # Fax #    Sepideh Burrsi PA-C [phone] [address] [facility] [address]        Equal Access to Services     KERON OCASIO : Hadii corazon ku hadasho Soomaali, waaxda luqadaha, qaybta kaalmada adeegyada, waxay idiin haybettyn maritza hyman. So [facility] [phone].    ATENCIÓN: Si habla español, tiene a carver disposición servicios gratuitos de asistencia lingüística. Llame al [phone].    We comply with applicable federal civil rights laws and [place] laws. We do not discriminate on the basis of race, color, national origin, age, disability, sex, sexual orientation, or gender identity.            Thank you!     Thank you for choosing [facility]  for your care. Our goal is always to provide you with excellent care. Hearing back from our patients is one way we can continue to improve our services. Please take a few minutes " to complete the written survey that you may receive in the mail after your visit with us. Thank you!             Your Updated Medication List - Protect others around you: Learn how to safely use, store and throw away your medicines at www.disposemymeds.org.          This list is accurate as of 8/1/18  5:56 PM.  Always use your most recent med list.                   Brand Name Dispense Instructions for use Diagnosis    alendronate 70 MG tablet    FOSAMAX    12 tablet    Take 1 tablet (70 mg) by mouth with 8oz water every 7 days 30 minutes before breakfast and remain upright during this time.    Age-related osteoporosis without current pathological fracture       buPROPion 300 MG 24 hr tablet    WELLBUTRIN XL    90 tablet    Take 1 tablet (300 mg) by mouth every morning    Major depression in complete remission (H)       ezetimibe 10 MG tablet    ZETIA    90 tablet    Take 0.5 tablets (5 mg) by mouth daily    Hyperlipidemia LDL goal <70       fluticasone 50 MCG/ACT spray    FLONASE    1 Bottle    Spray 1-2 sprays into both nostrils daily    Seasonal allergic rhinitis, unspecified chronicity, unspecified trigger       Multi-vitamin Tabs tablet   Generic drug:  multivitamin, therapeutic with minerals     30    1 TABLET DAILY    Routine general medical examination at a health care facility       order for DME     2 Device    Equipment being ordered: lymphedema bandages    Personal history of malignant neoplasm of other site, Other lymphedema       order for DME     1 each    Equipment being ordered: Left Thigh High Compression Stocking, 550 CCL.3    Myxoid liposarcoma (H)       rivaroxaban ANTICOAGULANT 20 MG Tabs tablet    XARELTO    90 tablet    Take 1 tablet (20 mg) by mouth daily (with dinner) Start after completing 15 mg tablets    Vascular graft occlusion, initial encounter (H)       rosuvastatin 40 MG tablet    CRESTOR    90 tablet    Take 1 tablet (40 mg) by mouth At Bedtime    Hyperlipidemia LDL goal <70

## 2018-08-01 NOTE — PROGRESS NOTES
Angeli Jacobson is a 68-year-old female who is status post resection of a proximal left thigh sarcoma with replacement of a portion of her proximal left superficial femoral artery utilizing a 6 mm ringed PTFE graft in 2000.  She has history of chronic left leg lymphedema.  She presented to my office in late April of this year with a 2-1/2 month history of short distance left leg cramping.  I took her for angiography on 4/30/18.  I was able to easily cross the occluded PTFE interposition graft located in her proximal left SFA.  She underwent thrombolysis as well as stenting of the portion of the mid graft utilizing a Viabahn 6 mm covered stent.  She had issues with bleeding from her right femoral artery access site and she did require open repair of the right femoral artery.  During that hospitalization we opted to start her empirically on Xarelto.    She presents today for a 3 month postoperative check.  She denies claudication.  She is back to wearing her custom left leg compression stocking for lymphedema.  At today's visit she was without complaints.    Exam:  Well-developed female in no acute distress.  Vital signs reviewed.  Right thigh ecchymosis completely resolved.  Moderate chronic lymphedema of the left lower extremity.  She is wearing her custom compression stocking today.  2+ palpable dorsalis pedis pulses bilaterally.    Imaging:  US LOWER EXTREMITY ARTERIAL DUPLEX LEFT   8/1/2018 2:45 PM      HISTORY: History of thrombolysis and stenting of proximal left SFA  PTFE graft on 5/1/2018. PAD (peripheral artery disease) (H).     COMPARISON: 5/5/2018.     FINDINGS: Color Doppler and spectral waveform analysis was performed  throughout the left proximal superficial femoral artery PTFE graft and  stent. The graft is patent. Diameters range between 5.1 to 6.6 cm.  Inflow and outflow arteries are patent. There is a stent in the  proximal bypass. Stent is patent with diameters ranging between 5.2  and 5.3  mm. Inflow and outflow is patent.         IMPRESSION: Patent left lower extremity bypass graft and stent in the  proximal bypass.     GEORGE HOLLEY, DO    ADRIAN DOPPLER WITH EXERCISE BILATERAL  8/1/2018 2:35 PM      HISTORY: History of thrombolysis and stenting of proximal left SFA  PTFE graft on 5/1/18; PAD (peripheral artery disease) (H)     COMPARISON: Sandra gram dated 5/1/2018 and ankle-brachial indices  dated 4/20/2018     FINDINGS:   The patient walked on a treadmill for 5 minutes at a 10% grade and at  a speed of 1.5 miles per hour     Patient had no leg symptoms with exercise.     The resting and exercise ADRIAN on the right are 1.34 and 1.19  respectively versus 1.21 and 1.21 on the prior exam.     The resting and exercise ADRIAN on the left are  1.16 and 1.08  respectively versus 0.59 and 0.39 on the prior exam.     Waveform analysis indicates multiphasic waveforms in the distal tibial  arteries         IMPRESSION: Normal ABIs.         ASSESSMENT:  18 years status post reconstruction of proximal left superficial femoral artery utilizing a 6 mm PTFE graft now 3 months status post successful graft thrombolysis with placement of a 6 mm covered stent within the bypass graft.  Clinically doing well with palpable pedal pulses and normalized ABIs.    RECOMMENDATION:  I had a nice discussion with Mrs. Jacobson reviewing all the above.  She has numerous questions regarding her medications.  She has an upcoming appointment with her primary care physician for a recheck of her lipid panel.  Currently she is on empiric Xarelto due to the recent graft thrombosis.  For now, I prefer to continue the Xarelto.  Vascular surgical follow-up will be with me in 6 months for repeat left leg graft ultrasound and an ADRIAN with exercise.    Total face-to-face time was 15 minutes, greater than 50% spent providing counseling and education.    Elijah Owens MD

## 2018-08-02 ENCOUNTER — MYC MEDICAL ADVICE (OUTPATIENT)
Dept: FAMILY MEDICINE | Facility: CLINIC | Age: 68
End: 2018-08-02

## 2018-08-02 LAB
ALT SERPL W P-5'-P-CCNC: 38 U/L (ref 0–50)
CHOLEST SERPL-MCNC: 175 MG/DL
HDLC SERPL-MCNC: 66 MG/DL
LDLC SERPL CALC-MCNC: 96 MG/DL
NONHDLC SERPL-MCNC: 109 MG/DL
TRIGL SERPL-MCNC: 67 MG/DL

## 2018-08-02 PROCEDURE — 84460 ALANINE AMINO (ALT) (SGPT): CPT | Performed by: PHYSICIAN ASSISTANT

## 2018-08-02 PROCEDURE — 80061 LIPID PANEL: CPT | Performed by: PHYSICIAN ASSISTANT

## 2018-08-02 PROCEDURE — 36415 COLL VENOUS BLD VENIPUNCTURE: CPT | Performed by: PHYSICIAN ASSISTANT

## 2018-08-06 DIAGNOSIS — I73.9 PAD (PERIPHERAL ARTERY DISEASE) (H): Primary | ICD-10-CM

## 2018-08-28 ENCOUNTER — OFFICE VISIT (OUTPATIENT)
Dept: FAMILY MEDICINE | Facility: CLINIC | Age: 68
End: 2018-08-28
Payer: COMMERCIAL

## 2018-08-28 VITALS
RESPIRATION RATE: 14 BRPM | DIASTOLIC BLOOD PRESSURE: 72 MMHG | TEMPERATURE: 97.8 F | HEIGHT: 67 IN | BODY MASS INDEX: 23.39 KG/M2 | HEART RATE: 72 BPM | SYSTOLIC BLOOD PRESSURE: 112 MMHG | OXYGEN SATURATION: 98 % | WEIGHT: 149 LBS

## 2018-08-28 DIAGNOSIS — R29.898 WEAKNESS OF BOTH LEGS: ICD-10-CM

## 2018-08-28 DIAGNOSIS — E78.5 HYPERLIPIDEMIA LDL GOAL <70: ICD-10-CM

## 2018-08-28 DIAGNOSIS — M54.6 CHRONIC BILATERAL THORACIC BACK PAIN: ICD-10-CM

## 2018-08-28 DIAGNOSIS — Z00.00 ROUTINE GENERAL MEDICAL EXAMINATION AT A HEALTH CARE FACILITY: Primary | ICD-10-CM

## 2018-08-28 DIAGNOSIS — T82.898A VASCULAR GRAFT OCCLUSION, INITIAL ENCOUNTER (H): ICD-10-CM

## 2018-08-28 DIAGNOSIS — G89.29 CHRONIC BILATERAL THORACIC BACK PAIN: ICD-10-CM

## 2018-08-28 DIAGNOSIS — M81.0 AGE-RELATED OSTEOPOROSIS WITHOUT CURRENT PATHOLOGICAL FRACTURE: ICD-10-CM

## 2018-08-28 DIAGNOSIS — F32.5 MAJOR DEPRESSION IN COMPLETE REMISSION (H): ICD-10-CM

## 2018-08-28 DIAGNOSIS — Z23 NEED FOR PROPHYLACTIC VACCINATION WITH TETANUS-DIPHTHERIA (TD): ICD-10-CM

## 2018-08-28 LAB
ERYTHROCYTE [DISTWIDTH] IN BLOOD BY AUTOMATED COUNT: 13.8 % (ref 10–15)
HCT VFR BLD AUTO: 44.2 % (ref 35–47)
HGB BLD-MCNC: 14.2 G/DL (ref 11.7–15.7)
MCH RBC QN AUTO: 29.5 PG (ref 26.5–33)
MCHC RBC AUTO-ENTMCNC: 32.1 G/DL (ref 31.5–36.5)
MCV RBC AUTO: 92 FL (ref 78–100)
PLATELET # BLD AUTO: 207 10E9/L (ref 150–450)
RBC # BLD AUTO: 4.82 10E12/L (ref 3.8–5.2)
WBC # BLD AUTO: 5.7 10E9/L (ref 4–11)

## 2018-08-28 PROCEDURE — 36415 COLL VENOUS BLD VENIPUNCTURE: CPT | Performed by: PHYSICIAN ASSISTANT

## 2018-08-28 PROCEDURE — G0438 PPPS, INITIAL VISIT: HCPCS | Performed by: PHYSICIAN ASSISTANT

## 2018-08-28 PROCEDURE — 85027 COMPLETE CBC AUTOMATED: CPT | Performed by: PHYSICIAN ASSISTANT

## 2018-08-28 PROCEDURE — 90471 IMMUNIZATION ADMIN: CPT | Performed by: PHYSICIAN ASSISTANT

## 2018-08-28 PROCEDURE — 80053 COMPREHEN METABOLIC PANEL: CPT | Performed by: PHYSICIAN ASSISTANT

## 2018-08-28 PROCEDURE — 99213 OFFICE O/P EST LOW 20 MIN: CPT | Mod: 25 | Performed by: PHYSICIAN ASSISTANT

## 2018-08-28 PROCEDURE — 90714 TD VACC NO PRESV 7 YRS+ IM: CPT | Performed by: PHYSICIAN ASSISTANT

## 2018-08-28 PROCEDURE — 80061 LIPID PANEL: CPT | Performed by: PHYSICIAN ASSISTANT

## 2018-08-28 RX ORDER — ALENDRONATE SODIUM 70 MG/1
TABLET ORAL
Qty: 12 TABLET | Refills: 3 | Status: SHIPPED | OUTPATIENT
Start: 2018-08-28 | End: 2019-08-13

## 2018-08-28 RX ORDER — EZETIMIBE 10 MG/1
5 TABLET ORAL DAILY
Qty: 90 TABLET | Refills: 1 | Status: ON HOLD | OUTPATIENT
Start: 2018-08-28 | End: 2018-12-22

## 2018-08-28 RX ORDER — BUPROPION HYDROCHLORIDE 300 MG/1
300 TABLET ORAL EVERY MORNING
Qty: 90 TABLET | Refills: 1 | Status: ON HOLD | OUTPATIENT
Start: 2018-08-28 | End: 2019-01-25

## 2018-08-28 RX ORDER — ROSUVASTATIN CALCIUM 40 MG/1
40 TABLET, COATED ORAL AT BEDTIME
Qty: 90 TABLET | Refills: 1 | Status: SHIPPED | OUTPATIENT
Start: 2018-08-28 | End: 2019-01-14 | Stop reason: DRUGHIGH

## 2018-08-28 ASSESSMENT — ACTIVITIES OF DAILY LIVING (ADL)
CURRENT_FUNCTION: NO ASSISTANCE NEEDED
I_NEED_ASSISTANCE_FOR_THE_FOLLOWING_DAILY_ACTIVITIES:: NO ASSISTANCE IS NEEDED

## 2018-08-28 NOTE — PROGRESS NOTES
SUBJECTIVE:   Angeli Jacobson is a 68 year old female who presents for Preventive Visit.      Are you in the first 12 months of your Medicare coverage?  No    Physical   Annual:     Getting at least 3 servings of Calcium per day:  Yes    Bi-annual eye exam:  Yes    Dental care twice a year:  Yes    Sleep apnea or symptoms of sleep apnea:  Daytime drowsiness    Diet:  Low fat/cholesterol    Frequency of exercise:  2-3 days/week    Duration of exercise:  30-45 minutes    Taking medications regularly:  Yes    Additional concerns today:  YES    Ability to successfully perform activities of daily living: no assistance needed    Home Safety:  No safety concerns identified    Hearing Impairment: difficulty following a conversation in a noisy restaurant or crowded room, feel that people are mumbling or not speaking clearly, difficulty following dialogue in the theater, need to ask people to speak up or repeat themselves and difficulty understanding soft or whispered speech      Will f/u with audiology re: hearing.   Fall risk:     click delete button to remove this line now  COGNITIVE SCREEN  1) Repeat 3 items (Leader, Season, Table)    2) Clock draw: NORMAL  3) 3 item recall: Recalls 3 objects  Results: 3 items recalled: COGNITIVE IMPAIRMENT LESS LIKELY    Mini-CogTM Copyright S [employer]. Licensed by the author for use in [facility]; reprinted with permission (soob@.[facility]). All rights reserved.        Reviewed and updated as needed this visit by clinical staff  Allergies         Reviewed and updated as needed this visit by Provider        Social History   Substance Use Topics     Smoking status: Never Smoker     Smokeless tobacco: Never Used     Alcohol use No       Alcohol Use 8/28/2018   If you drink alcohol do you typically have greater than 3 drinks per day OR greater than 7 drinks per week? Not Applicable       Noticed weakness in legs. Referral for ortho?     Hyperlipidemia Follow-Up      Rate your  low fat/cholesterol diet?: good    Taking statin?  Yes, no muscle aches from statin    Other lipid medications/supplements?:  none    Vascular Disease Follow-up:  Peripheral Vascular Disease (PVD)      Chest pain or pressure, left side neck or arm pain: No    Shortness of breath/increased sweats/nausea with exertion: No    Pain in calves walking 1-2 blocks: No    Worsened or new symptoms since last visit: No    Nitroglycerin use: no    Daily aspirin use: No    Depression Followup    Status since last visit: Stable     See PHQ-9 for current symptoms.  Other associated symptoms: None    Complicating factors:   Significant life event:  No   Current substance abuse:  None  Anxiety or Panic symptoms:  No    PHQ-9 2/7/2017 8/24/2017 4/13/2018   Total Score 3 4 3   Q9: Suicide Ideation Not at all Not at all Not at all     In the past two weeks have you had thoughts of suicide or self-harm?  No.    Do you have concerns about your personal safety or the safety of others?   No  PHQ-9  English  PHQ-9   Any Language  Suicide Assessment Five-step Evaluation and Treatment (SAFE-T)    Today's PHQ-2 Score:   PHQ-2 ( 1999 Pf[employer]) 8/28/2018   Q1: Little interest or pleasure in doing things 0   Q2: Feeling down, depressed or hopeless 0   PHQ-2 Score 0   Q1: Little interest or pleasure in doing things Not at all   Q2: Feeling down, depressed or hopeless Not at all   PHQ-2 Score 0       Do you feel safe in your environment - Yes    Do you have a Health Care Directive?: Yes: Advance Directive has been received and scanned.    Current providers sharing in care for this patient include:   Patient Care Team:  Sepideh Burris, PA-C as PCP - General (Physician Assistant)    The following health maintenance items are reviewed in Epic and correct as of today:  Health Maintenance   Topic Date Due     TETANUS Q10 YR  03/10/2018     FALL RISK ASSESSMENT  08/24/2018     CYNDI QUESTIONNAIRE 1 YEAR  08/24/2018     DEPRESSION ACTION PLAN Q1 YR   "08/24/2018     INFLUENZA VACCINE (1) 09/01/2018     PHQ-9 Q6 MONTHS  10/13/2018     LIPID MONITORING Q1 YEAR  08/02/2019     MAMMO Q2 YR  10/18/2019     DEXA Q2 YR  10/18/2019     ADVANCE DIRECTIVE PLANNING Q5 YRS  08/25/2020     COLONOSCOPY Q5 YR  02/05/2021     PNEUMOCOCCAL  Completed     HEPATITIS C SCREENING  Completed     Labs reviewed in EPIC        Review of Systems  Constitutional, HEENT, cardiovascular, pulmonary, GI, , musculoskeletal, neuro, skin, endocrine and psych systems are negative, except as otherwise noted.    OBJECTIVE:   LMP 03/18/2005 Estimated body mass index is 24.48 kg/(m^2) as calculated from the following:    Height as of 8/1/18: 5' 6.5\" (1.689 m).    Weight as of 8/1/18: 154 lb (69.9 kg).  Physical Exam  GENERAL APPEARANCE: healthy, alert and no distress  EYES: Eyes grossly normal to inspection, PERRL and conjunctivae and sclerae normal  HENT: ear canals and TM's normal, nose and mouth without ulcers or lesions, oropharynx clear and oral mucous membranes moist  NECK: no adenopathy, no asymmetry, masses, or scars and thyroid normal to palpation  RESP: lungs clear to auscultation - no rales, rhonchi or wheezes  BREAST: normal without masses, tenderness or nipple discharge and no palpable axillary masses or adenopathy  CV: regular rate and rhythm, normal S1 S2, no S3 or S4, no murmur, click or rub, no peripheral edema and peripheral pulses strong  ABDOMEN: soft, nontender, no hepatosplenomegaly, no masses and bowel sounds normal  MS: no musculoskeletal defects are noted and gait is age appropriate without ataxia  SKIN: no suspicious lesions or rashes  NEURO: Normal strength and tone, sensory exam grossly normal, mentation intact and speech normal  PSYCH: mentation appears normal and affect normal/bright        ASSESSMENT / PLAN:   1. Routine general medical examination at a health care facility    - CBC with platelets  - Comprehensive metabolic panel  - Lipid panel reflex to direct LDL " "Fasting    2. Need for prophylactic vaccination with tetanus-diphtheria (TD)    - TD PRESERV FREE, IM (7+ YRS)    3. Weakness of both legs    - ORTHO  REFERRAL    4. Chronic bilateral thoracic back pain    - ORTHO  REFERRAL    5. Age-related osteoporosis without current pathological fracture    - alendronate (FOSAMAX) 70 MG tablet; Take 1 tablet (70 mg) by mouth with 8oz water every 7 days 30 minutes before breakfast and remain upright during this time.  Dispense: 12 tablet; Refill: 3    6. Major depression in complete remission (H)  Stable. Recheck 6-12 months  - buPROPion (WELLBUTRIN XL) 300 MG 24 hr tablet; Take 1 tablet (300 mg) by mouth every morning  Dispense: 90 tablet; Refill: 1    7. Hyperlipidemia LDL goal <70  Stable.   - ezetimibe (ZETIA) 10 MG tablet; Take 0.5 tablets (5 mg) by mouth daily  Dispense: 90 tablet; Refill: 1  - rosuvastatin (CRESTOR) 40 MG tablet; Take 1 tablet (40 mg) by mouth At Bedtime  Dispense: 90 tablet; Refill: 1    8. Vascular graft occlusion, initial encounter (H)  Stable.   - rivaroxaban ANTICOAGULANT (XARELTO) 20 MG TABS tablet; Take 1 tablet (20 mg) by mouth daily (with dinner) Start after completing 15 mg tablets  Dispense: 90 tablet; Refill: 1    End of Life Planning:  Patient currently has an advanced directive: Yes.  Practitioner is supportive of decision.    COUNSELING:  Reviewed preventive health counseling, as reflected in patient instructions       Regular exercise       Healthy diet/nutrition       Vision screening       Hearing screening       Dental care       Immunizations    Vaccinated for: Td             Advanced Planning     BP Readings from Last 1 Encounters:   08/01/18 110/68     Estimated body mass index is 24.48 kg/(m^2) as calculated from the following:    Height as of 8/1/18: 5' 6.5\" (1.689 m).    Weight as of 8/1/18: 154 lb (69.9 kg).           reports that she has never smoked. She has never used smokeless tobacco.      Appropriate " preventive services were discussed with this patient, including applicable screening as appropriate for cardiovascular disease, diabetes, osteopenia/osteoporosis, and glaucoma.  As appropriate for age/gender, discussed screening for colorectal cancer, prostate cancer, breast cancer, and cervical cancer. Checklist reviewing preventive services available has been given to the patient.    Reviewed patients plan of care and provided an AVS. The Basic Care Plan (routine screening as documented in Health Maintenance) for Angeli meets the Care Plan requirement. This Care Plan has been established and reviewed with the Patient.    Counseling Resources:  ATP IV Guidelines  Pooled Cohorts Equation Calculator  Breast Cancer Risk Calculator  FRAX Risk Assessment  ICSI Preventive Guidelines  Dietary Guidelines for Americans, 2010  [employer]'s MyPlate  ASA Prophylaxis  Lung CA Screening    Sepideh Burris PA-C  [facility]  Answers for HPI/ROS submitted by the patient on 8/28/2018   PHQ-2 Score: 0

## 2018-08-28 NOTE — NURSING NOTE
Screening Questionnaire for Adult Immunization    Are you sick today?   No   Do you have allergies to medications, food, a vaccine component or latex?   No   Have you ever had a serious reaction after receiving a vaccination?   No   Do you have a long-term health problem with heart disease, lung disease, asthma, kidney disease, metabolic disease (e.g. diabetes), anemia, or other blood disorder?   No   Do you have cancer, leukemia, HIV/AIDS, or any other immune system problem?   No   In the past 3 months, have you taken medications that affect  your immune system, such as prednisone, other steroids, or anticancer drugs; drugs for the treatment of rheumatoid arthritis, Crohn s disease, or psoriasis; or have you had radiation treatments?   No   Have you had a seizure, or a brain or other nervous system problem?   No   During the past year, have you received a transfusion of blood or blood     products, or been given immune (gamma) globulin or antiviral drug?   No   For women: Are you pregnant or is there a chance you could become        pregnant during the next month?   No   Have you received any vaccinations in the past 4 weeks?   No     Immunization questionnaire answers were all negative.        Per orders of Sepideh Burris, injection of TD  given by Usha Carter. Patient instructed to remain in clinic for 15 minutes afterwards, and to report any adverse reaction to me immediately.       Screening performed by Usha Carter on 8/28/2018 at 10:25 AM.

## 2018-08-28 NOTE — MR AVS SNAPSHOT
After Visit Summary   8/28/2018    Angeli Jacobson    MRN: [id number]           Patient Information     Date Of Birth          1950        Visit Information        Provider Department      8/28/2018 9:30 AM Sepideh Burris PA-C [facility]        Today's Diagnoses     Need for prophylactic vaccination with tetanus-diphtheria (TD)    -  1      Care Instructions      Preventive Health Recommendations    Female Ages 65 +    Yearly exam:     See your health care provider every year in order to  o Review health changes.   o Discuss preventive care.    o Review your medicines if your doctor has prescribed any.      You no longer need a yearly Pap test unless you've had an abnormal Pap test in the past 10 years. If you have vaginal symptoms, such as bleeding or discharge, be sure to talk with your provider about a Pap test.      Every 1 to 2 years, have a mammogram.  If you are over 69, talk with your health care provider about whether or not you want to continue having screening mammograms.      Every 10 years, have a colonoscopy. Or, have a yearly FIT test (stool test). These exams will check for colon cancer.       Have a cholesterol test every 5 years, or more often if your doctor advises it.       Have a diabetes test (fasting glucose) every three years. If you are at risk for diabetes, you should have this test more often.       At age 65, have a bone density scan (DEXA) to check for osteoporosis (brittle bone disease).    Shots:    Get a flu shot each year.    Get a tetanus shot every 10 years.    Talk to your doctor about your pneumonia vaccines. There are now two you should receive - Pneumovax (PPSV 23) and Prevnar (PCV 13).    Talk to your pharmacist about the shingles vaccine.    Talk to your doctor about the hepatitis B vaccine.    Nutrition:     Eat at least 5 servings of fruits and vegetables each day.      Eat whole-grain bread, whole-wheat pasta and brown rice  instead of white grains and rice.      Get adequate Calcium and Vitamin D.     Lifestyle    Exercise at least 150 minutes a week (30 minutes a day, 5 days a week). This will help you control your weight and prevent disease.      Limit alcohol to one drink per day.      No smoking.       Wear sunscreen to prevent skin cancer.       See your dentist twice a year for an exam and cleaning.      See your eye doctor every 1 to 2 years to screen for conditions such as glaucoma, macular degeneration and cataracts.          Follow-ups after your visit        Who to contact     If you have questions or need follow up information about today's clinic visit or your schedule please contact [facility] directly at [phone].  Normal or non-critical lab and imaging results will be communicated to you by Maktoobhart, letter or phone within 4 business days after the clinic has received the results. If you do not hear from us within 7 days, please contact the clinic through [employer] or phone. If you have a critical or abnormal lab result, we will notify you by phone as soon as possible.  Submit refill requests through [employer] or call your pharmacy and they will forward the refill request to us. Please allow 3 business days for your refill to be completed.          Additional Information About Your Visit        MaktoobharNascentric Information     [employer] gives you secure access to your electronic health record. If you see a primary care provider, you can also send messages to your care team and make appointments. If you have questions, please call your primary care clinic.  If you do not have a primary care provider, please call [phone] and they will assist you.        Care EveryWhere ID     This is your Care EveryWhere ID. This could be used by other organizations to access your [facility] medical records  [id number]        Your Vitals Were     Pulse Temperature Respirations Height Last Period Pulse Oximetry    72 97.8  F  "(36.6  C) (Oral) 14 5' 6.5\" (1.689 m) 03/18/2005 98%    Breastfeeding? BMI (Body Mass Index)                No 23.69 kg/m2           Blood Pressure from Last 3 Encounters:   08/28/18 112/72   08/01/18 110/68   05/22/18 120/80    Weight from Last 3 Encounters:   08/28/18 149 lb (67.6 kg)   08/01/18 154 lb (69.9 kg)   05/22/18 154 lb (69.9 kg)              Today, you had the following     No orders found for display       Primary Care Provider Office Phone # Fax #    Sepideh Burris PA-C [phone]       [address] [facility] [address]        Equal Access to Services     KERON OCASIO : Hadii aad ku hadasho Soangel, waaxda luqadaha, qaybta kaalmada adeegyada, radames hatch . So [facility] [phone].    ATENCIÓN: Si habla español, tiene a carver disposición servicios gratuitos de asistencia lingüística. Llame al [phone].    We comply with applicable federal civil rights laws and [place] laws. We do not discriminate on the basis of race, color, national origin, age, disability, sex, sexual orientation, or gender identity.            Thank you!     Thank you for choosing [facility]  for your care. Our goal is always to provide you with excellent care. Hearing back from our patients is one way we can continue to improve our services. Please take a few minutes to complete the written survey that you may receive in the mail after your visit with us. Thank you!             Your Updated Medication List - Protect others around you: Learn how to safely use, store and throw away your medicines at www.disposemymeds.org.          This list is accurate as of 8/28/18  9:46 AM.  Always use your most recent med list.                   Brand Name Dispense Instructions for use Diagnosis    alendronate 70 MG tablet    FOSAMAX    12 tablet    Take 1 tablet (70 mg) by mouth with 8oz water every 7 days 30 minutes before breakfast and remain upright during this time.    " Age-related osteoporosis without current pathological fracture       buPROPion 300 MG 24 hr tablet    WELLBUTRIN XL    90 tablet    Take 1 tablet (300 mg) by mouth every morning    Major depression in complete remission (H)       ezetimibe 10 MG tablet    ZETIA    90 tablet    Take 0.5 tablets (5 mg) by mouth daily    Hyperlipidemia LDL goal <70       fluticasone 50 MCG/ACT spray    FLONASE    1 Bottle    Spray 1-2 sprays into both nostrils daily    Seasonal allergic rhinitis, unspecified chronicity, unspecified trigger       Multi-vitamin Tabs tablet   Generic drug:  multivitamin, therapeutic with minerals     30    1 TABLET DAILY    Routine general medical examination at a health care facility       order for DME     2 Device    Equipment being ordered: lymphedema bandages    Personal history of malignant neoplasm of other site, Other lymphedema       order for DME     1 each    Equipment being ordered: Left Thigh High Compression Stocking, 550 CCL.3    Myxoid liposarcoma (H)       rivaroxaban ANTICOAGULANT 20 MG Tabs tablet    XARELTO    90 tablet    Take 1 tablet (20 mg) by mouth daily (with dinner) Start after completing 15 mg tablets    Vascular graft occlusion, initial encounter (H)       rosuvastatin 40 MG tablet    CRESTOR    90 tablet    Take 1 tablet (40 mg) by mouth At Bedtime    Hyperlipidemia LDL goal <70

## 2018-08-29 LAB
ALBUMIN SERPL-MCNC: 4 G/DL (ref 3.4–5)
ALP SERPL-CCNC: 63 U/L (ref 40–150)
ALT SERPL W P-5'-P-CCNC: 40 U/L (ref 0–50)
ANION GAP SERPL CALCULATED.3IONS-SCNC: 5 MMOL/L (ref 3–14)
AST SERPL W P-5'-P-CCNC: 32 U/L (ref 0–45)
BILIRUB SERPL-MCNC: 0.3 MG/DL (ref 0.2–1.3)
BUN SERPL-MCNC: 15 MG/DL (ref 7–30)
CALCIUM SERPL-MCNC: 9.2 MG/DL (ref 8.5–10.1)
CHLORIDE SERPL-SCNC: 107 MMOL/L (ref 94–109)
CHOLEST SERPL-MCNC: 166 MG/DL
CO2 SERPL-SCNC: 30 MMOL/L (ref 20–32)
CREAT SERPL-MCNC: 0.8 MG/DL (ref 0.52–1.04)
GFR SERPL CREATININE-BSD FRML MDRD: 71 ML/MIN/1.7M2
GLUCOSE SERPL-MCNC: 92 MG/DL (ref 70–99)
HDLC SERPL-MCNC: 63 MG/DL
LDLC SERPL CALC-MCNC: 91 MG/DL
NONHDLC SERPL-MCNC: 103 MG/DL
POTASSIUM SERPL-SCNC: 4.5 MMOL/L (ref 3.4–5.3)
PROT SERPL-MCNC: 7.2 G/DL (ref 6.8–8.8)
SODIUM SERPL-SCNC: 142 MMOL/L (ref 133–144)
TRIGL SERPL-MCNC: 59 MG/DL

## 2018-09-05 ENCOUNTER — OFFICE VISIT (OUTPATIENT)
Dept: NEUROSURGERY | Facility: CLINIC | Age: 68
End: 2018-09-05
Attending: NEUROLOGICAL SURGERY
Payer: COMMERCIAL

## 2018-09-05 VITALS — OXYGEN SATURATION: 94 % | DIASTOLIC BLOOD PRESSURE: 79 MMHG | HEART RATE: 74 BPM | SYSTOLIC BLOOD PRESSURE: 113 MMHG

## 2018-09-05 DIAGNOSIS — R29.898 LEG WEAKNESS, BILATERAL: Primary | ICD-10-CM

## 2018-09-05 PROCEDURE — 99203 OFFICE O/P NEW LOW 30 MIN: CPT | Performed by: NURSE PRACTITIONER

## 2018-09-05 PROCEDURE — G0463 HOSPITAL OUTPT CLINIC VISIT: HCPCS

## 2018-09-05 ASSESSMENT — PAIN SCALES - GENERAL: PAINLEVEL: MILD PAIN (3)

## 2018-09-05 NOTE — LETTER
"    9/5/2018         RE: Angeli Jacobson  [id number] [facility] [address]        Dear Colleague,    Thank you for referring your patient, Angeli Jacobson, to the [facility]. Please see a copy of my visit note below.    Dr. Abisai Palmer  [facility]  Neurosurgery Clinic Visit        CC: leg weakness    Primary care Provider: Sepideh Burris      Reason For Visit:   I was asked by Dr. Burris to consult on the patient for leg weakness.      HPI: Angeli Jacobson is a 68 year old female with leg weakness. The pt reports that about 1 week ago she tried to get up on a horse and experienced leg weakness. She could barely stand during that time as well.  She states that since then she has not tried to get back on to a horse and it was very alarming for her.  She then noted getting up and down on the risers was hard for her and she felt unsteady.  She has not fallen and denies any foot drop or drag.  She reports that her legs just feel like \"jelly\" at times.      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    Past Surgical History:   Procedure Laterality Date     C APPENDECTOMY      Appendectomy     C NONSPECIFIC PROCEDURE  04/2000    Open Biopsy Left Thigh Liposarcoma     COLONOSCOPY N/A 2/5/2016    Procedure: COMBINED COLONOSCOPY, SINGLE OR MULTIPLE BIOPSY/POLYPECTOMY BY BIOPSY;  Surgeon: Varun Stanley MD, MD;  Location: [facility]     EXCISION MALIG LESION>1.25CM  5/2000    Myxoid Liposarcoma       EXPLORE GROIN Right 5/1/2018    Procedure: EXPLORE GROIN;  EMERGENCY RIGHT FEMORAL EXPLORATION WITH FEMORAL ARTERY REPAIR.    EBL: 50mL;  Surgeon: Shade Owens MD;  Location: [facility] OR     HC COLONOSCOPY THRU STOMA, DIAGNOSTIC  2006 due 2010     HC COLP CERVIX/UPPER VAGINA  07/1997    Negative     HC DILATION/CURETTAGE DIAG/THER NON OB  02/1997    Post menopausal bleeding on HRT, negative     Past Surgical History reviewed with patient during visit.    Current Outpatient Prescriptions   Medication     alendronate (FOSAMAX) 70 MG tablet     buPROPion (WELLBUTRIN XL) 300 MG 24 hr tablet     ezetimibe (ZETIA) 10 MG tablet     fluticasone (FLONASE) 50 MCG/ACT spray     MULTI-VITAMIN OR TABS     order for DME     ORDER FOR DME     rivaroxaban ANTICOAGULANT (XARELTO) 20 MG TABS tablet     rosuvastatin (CRESTOR) 40 MG tablet     No current facility-administered medications for this visit.        Allergies   Allergen Reactions     Celexa [Citalopram Hydrobromide]      Decreased libido       Social History     Social History     Marital status:      Spouse name: Chris     Number of children: 3     Years of education: 14     Occupational History     at home       None      Social History Main Topics     Smoking status: Never Smoker     Smokeless tobacco: Never Used     Alcohol use No     Drug use: No     Sexual activity: Yes     Partners: Male     Birth control/ protection: Condom     Other Topics Concern      Parent/Sibling W/ Cabg, Mi Or Angioplasty Before 65f 55m? No     Social History Narrative       Family History   Problem Relation Age of Onset     C.A.D. Father      MI 57     Alcohol/Drug Father      etoh     Obesity Mother      Osteoporosis Mother      Colon Cancer Brother 70     Hyperlipidemia Son      Hyperlipidemia Son      very high, experimental drug     C.A.KARYN. Paternal Grandmother      ascvd     Diabetes Maternal Grandmother      Cancer Maternal Grandmother      C.A.KARYN. Paternal Uncle      Mi  age 48     Cancer Maternal Aunt      pancreatic CA         Review Of Systems  Skin: negative  Eyes: negative  Ears/Nose/Throat: negative  Respiratory: No shortness of breath, dyspnea on exertion, cough, or hemoptysis  Cardiovascular: negative  Gastrointestinal: negative  Genitourinary: negative  Musculoskeletal: negative  Neurologic: leg weakness   Psychiatric: negative  Hematologic/Lymphatic/Immunologic: negative  Endocrine: negative     ROS: 10 point ROS neg other than the symptoms noted above in the HPI.      Vital Signs: /79 (BP Location: Right arm, Patient Position: Sitting, Cuff Size: Adult Large)  Pulse 74  LMP 2005  SpO2 94%    Examination:  Constitutional:  Alert, well nourished, NAD.  Memory: recent and remote memory intact   HEENT: Normocephalic, atraumatic.   Pulm:  Without shortness of breath   CV:  No pitting edema of BLE.    Neurological:  Awake  Alert  Oriented x 3  Speech clear  Cranial nerves II - XII intact  PERRL  EOMI  Face symmetric  Tongue midline  Motor exam   Shoulder Abduction:  Right:  5/5   Left:  5/5  Biceps:                      Right:  5/5   Left:  5/5  Triceps:                     Right:  5/5   Left:  5/5  Wrist Extensors:       Right:  5/5   Left:  5/5  Wrist Flexors:           Right:  5/5   Left:  5/5  Intrinsics:                   Right:  5/5   Left:  5/5   Hip Flexor:                Right: 5/5  Left:  5/5  Hip Adductor:             Right:  5/5  Left:  5/5  Hip  "Abductor:             Right:  5/5  Left:  5/5  Gastroc Soleus:        Right:  5/5  Left:  5/5  Tib/Ant:                      Right:  5/5  Left:  5/5  EHL:                          Right:  5/5  Left:  5/5     Negative clonus or hyperreflexia    Sensation normal to bilateral upper and lower extremities  Muscle tone to bilateral upper and lower extremities normal   Gait: Able to stand from a seated position. Normal non-antalgic, non-myelopathic gait.  Able to heel/toe walk without loss of balance    Pt unsteady when going up and down a step stool repetitively.      Lumbar examination reveals no tenderness of the spine or paraspinous muscles.  Hip height is symmetrical. Negative SI joint, sciatic notch or greater trochanteric tenderness to palpation bilaterally.  Straight leg raise is negative bilaterally.      Imaging: NONE    Assessment/Plan:    Angeli Jacobson is a 68 year old female with leg weakness. The pt reports that about 1 week ago she tried to get up on a horse and experienced leg weakness. She could barely stand during that time as well.  She states that since then she has not tried to get back on to a horse and it was very alarming for her.  She then noted getting up and down on the risers was hard for her and she felt unsteady.  She has not fallen and denies any foot drop or drag.  She reports that her legs just feel like \"jelly\" at times.  The pt is unsteady with going up and down on the step stool. We discussed that I would recommend a thoracic and lumbar MRI to rule out myelopathy.  If they come back  Negative I would recommend an EMG. She notes left leg numbness as well.  No foot drop noted.        Patient Instructions   1. Please call [facility] to have lumbar and thoracic MRI completed. Call [phone] to schedule your scan.  I will contact you with results.                Carmen Zarate [facility]  [facility]ine and [facility]  [address]  [place] [address]    Tel [phone]  Pager [phone]      Again, thank you for allowing me to participate in the care of your patient.        Sincerely,        ELVA Armijo CNP

## 2018-09-05 NOTE — NURSING NOTE
"Angeli Jacobson is a 68 year old female who presents for:  Chief Complaint   Patient presents with     Neurologic Problem     Chronic bilateral mid low back pain         Vitals:    Vitals:    09/05/18 1313   BP: 113/79   BP Location: Right arm   Patient Position: Sitting   Cuff Size: Adult Large   Pulse: 74   SpO2: 94%       BMI:  Estimated body mass index is 23.69 kg/(m^2) as calculated from the following:    Height as of 8/28/18: 5' 6.5\" (1.689 m).    Weight as of 8/28/18: 149 lb (67.6 kg).    Pain Score:  Mild Pain (3)      Do you feel safe in your environment?  Yes      Shyann Mcnally          "

## 2018-09-05 NOTE — PROGRESS NOTES
"Dr. Abisai Palmer  [facility]  Neurosurgery Clinic Visit        CC: leg weakness    Primary care Provider: Sepideh Burris      Reason For Visit:   I was asked by Dr. Burris to consult on the patient for leg weakness.      HPI: Angeli Jacobson is a 68 year old female with leg weakness. The pt reports that about 1 week ago she tried to get up on a horse and experienced leg weakness. She could barely stand during that time as well.  She states that since then she has not tried to get back on to a horse and it was very alarming for her.  She then noted getting up and down on the risers was hard for her and she felt unsteady.  She has not fallen and denies any foot drop or drag.  She reports that her legs just feel like \"jelly\" at times.      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    Past Surgical History:   Procedure Laterality Date     C APPENDECTOMY      Appendectomy     C NONSPECIFIC PROCEDURE  04/2000    Open Biopsy Left Thigh " Liposarcoma     COLONOSCOPY N/A 2/5/2016    Procedure: COMBINED COLONOSCOPY, SINGLE OR MULTIPLE BIOPSY/POLYPECTOMY BY BIOPSY;  Surgeon: Varun Stanley MD, MD;  Location: [facility]     EXCISION MALIG LESION>1.25CM  5/2000    Myxoid Liposarcoma       EXPLORE GROIN Right 5/1/2018    Procedure: EXPLORE GROIN;  EMERGENCY RIGHT FEMORAL EXPLORATION WITH FEMORAL ARTERY REPAIR.    EBL: 50mL;  Surgeon: Shade Owens MD;  Location: [facility] OR     HC COLONOSCOPY THRU STOMA, DIAGNOSTIC  2006 due 2010     HC COLP CERVIX/UPPER VAGINA  07/1997    Negative     HC DILATION/CURETTAGE DIAG/THER NON OB  02/1997    Post menopausal bleeding on HRT, negative     Past Surgical History reviewed with patient during visit.    Current Outpatient Prescriptions   Medication     alendronate (FOSAMAX) 70 MG tablet     buPROPion (WELLBUTRIN XL) 300 MG 24 hr tablet     ezetimibe (ZETIA) 10 MG tablet     fluticasone (FLONASE) 50 MCG/ACT spray     MULTI-VITAMIN OR TABS     order for DME     ORDER FOR DME     rivaroxaban ANTICOAGULANT (XARELTO) 20 MG TABS tablet     rosuvastatin (CRESTOR) 40 MG tablet     No current facility-administered medications for this visit.        Allergies   Allergen Reactions     Celexa [Citalopram Hydrobromide]      Decreased libido       Social History     Social History     Marital status:      Spouse name: Chris     Number of children: 3     Years of education: 14     Occupational History     at home       None      Social History Main Topics     Smoking status: Never Smoker     Smokeless tobacco: Never Used     Alcohol use No     Drug use: No     Sexual activity: Yes     Partners: Male     Birth control/ protection: Condom     Other Topics Concern     Parent/Sibling W/ Cabg, Mi Or Angioplasty Before 65f 55m? No     Social History Narrative       Family History   Problem Relation Age of Onset     C.A.D. Father      MI 57     Alcohol/Drug Father      etoh     Obesity Mother      Osteoporosis Mother      Colon Cancer  Brother 70     Hyperlipidemia Son      Hyperlipidemia Son      very high, experimental drug     C.A.D. Paternal Grandmother      ascvd     Diabetes Maternal Grandmother      Cancer Maternal Grandmother      CJinATAWANNA. Paternal Uncle      Mi  age 48     Cancer Maternal Aunt      pancreatic CA         Review Of Systems  Skin: negative  Eyes: negative  Ears/Nose/Throat: negative  Respiratory: No shortness of breath, dyspnea on exertion, cough, or hemoptysis  Cardiovascular: negative  Gastrointestinal: negative  Genitourinary: negative  Musculoskeletal: negative  Neurologic: leg weakness   Psychiatric: negative  Hematologic/Lymphatic/Immunologic: negative  Endocrine: negative     ROS: 10 point ROS neg other than the symptoms noted above in the HPI.      Vital Signs: /79 (BP Location: Right arm, Patient Position: Sitting, Cuff Size: Adult Large)  Pulse 74  LMP 2005  SpO2 94%    Examination:  Constitutional:  Alert, well nourished, NAD.  Memory: recent and remote memory intact   HEENT: Normocephalic, atraumatic.   Pulm:  Without shortness of breath   CV:  No pitting edema of BLE.    Neurological:  Awake  Alert  Oriented x 3  Speech clear  Cranial nerves II - XII intact  PERRL  EOMI  Face symmetric  Tongue midline  Motor exam   Shoulder Abduction:  Right:  5/5   Left:  55  Biceps:                      Right:  5/5   Left:  55  Triceps:                     Right:  5/5   Left:  5/5  Wrist Extensors:       Right:  5/5   Left:  5/5  Wrist Flexors:           Right:  5/5   Left:  55  Intrinsics:                   Right:  5   Left:  55   Hip Flexor:                Right: 5  Left:  55  Hip Adductor:             Right:  55  Left:  55  Hip Abductor:             Right:  5/5  Left:  5/5  Gastroc Soleus:        Right:  5/5  Left:  5  Tib/Ant:                      Right:  5/  Left:  55  EHL:                          Right:  5  Left:  55     Negative clonus or hyperreflexia    Sensation normal to  "bilateral upper and lower extremities  Muscle tone to bilateral upper and lower extremities normal   Gait: Able to stand from a seated position. Normal non-antalgic, non-myelopathic gait.  Able to heel/toe walk without loss of balance    Pt unsteady when going up and down a step stool repetitively.      Lumbar examination reveals no tenderness of the spine or paraspinous muscles.  Hip height is symmetrical. Negative SI joint, sciatic notch or greater trochanteric tenderness to palpation bilaterally.  Straight leg raise is negative bilaterally.      Imaging: NONE    Assessment/Plan:    Angeli Jacobson is a 68 year old female with leg weakness. The pt reports that about 1 week ago she tried to get up on a horse and experienced leg weakness. She could barely stand during that time as well.  She states that since then she has not tried to get back on to a horse and it was very alarming for her.  She then noted getting up and down on the risers was hard for her and she felt unsteady.  She has not fallen and denies any foot drop or drag.  She reports that her legs just feel like \"jelly\" at times.  The pt is unsteady with going up and down on the step stool. We discussed that I would recommend a thoracic and lumbar MRI to rule out myelopathy.  If they come back  Negative I would recommend an EMG. She notes left leg numbness as well.  No foot drop noted.        Patient Instructions   1. Please call [facility] to have lumbar and thoracic MRI completed. Call [phone] to schedule your scan.  I will contact you with results.                Carmen Zarate [facility]  Spine and Brain Clinic  [address] [address]    Tel [phone]  Pager [phone]    "

## 2018-09-05 NOTE — PATIENT INSTRUCTIONS
1. Please call [facility] to have lumbar and thoracic MRI completed. Call [phone] to schedule your scan.  I will contact you with results.

## 2018-09-11 ENCOUNTER — HOSPITAL ENCOUNTER (OUTPATIENT)
Dept: MRI IMAGING | Facility: CLINIC | Age: 68
Discharge: HOME | End: 2018-09-11
Attending: NURSE PRACTITIONER | Admitting: NURSE PRACTITIONER
Payer: MEDICARE

## 2018-09-11 ENCOUNTER — HOSPITAL ENCOUNTER (OUTPATIENT)
Dept: MRI IMAGING | Facility: CLINIC | Age: 68
End: 2018-09-11
Attending: NURSE PRACTITIONER
Payer: MEDICARE

## 2018-09-11 DIAGNOSIS — R29.898 LEG WEAKNESS, BILATERAL: ICD-10-CM

## 2018-09-11 PROCEDURE — 72148 MRI LUMBAR SPINE W/O DYE: CPT

## 2018-09-11 PROCEDURE — 72146 MRI CHEST SPINE W/O DYE: CPT

## 2018-09-12 ENCOUNTER — TELEPHONE (OUTPATIENT)
Dept: NEUROSURGERY | Facility: CLINIC | Age: 68
End: 2018-09-12

## 2018-09-12 NOTE — TELEPHONE ENCOUNTER
REASON FOR CALL:  Pt returned call to discuss results. Requesting call back on her home number.    Detailed message can be left:  YES

## 2018-09-12 NOTE — TELEPHONE ENCOUNTER
MRI shows no severe stenosis or cord impingement. Incidental cysts on thoracic spine.  Lumbar spine shows small disc herniation. Recc. PT and injection if she would like.    LM at both numbers.

## 2018-09-12 NOTE — TELEPHONE ENCOUNTER
Spoke with patient. Per Carmen THOMSON, MRI shows no severe stenosis or cord impingement. Incidental cysts on thoracic spine. Lumbar spine shows small disc herniation. Recc PT and injection if patient interested.     Discussed with patient. She is not interested in PT or injection at this time, but will call back if she'd like to proceed with orders.

## 2018-09-14 NOTE — TELEPHONE ENCOUNTER
Patient wondering if there's any recommendation/next steps regarding incidental cyst on thoracic spine.    Discussed with Carmen THOMSON. Cyst is benign. Not causing any concern, structural issues, or pain. Advised patient to call back if any new pain or symptoms develop. She voiced understanding.

## 2018-10-12 ENCOUNTER — NURSE TRIAGE (OUTPATIENT)
Dept: NURSING | Facility: CLINIC | Age: 68
End: 2018-10-12

## 2018-10-12 NOTE — TELEPHONE ENCOUNTER
Urinary frequency and pain with urination past 24 hours/no fever/has had these before/ wants a script called in but Dr Burris has already addressed her question and recommends an e visit or a clinic visit/ is going out of town tomorrow afternoon/ sent for an appointment or will go to urgent care   Jayro Draper RN [phone]  Reason for Disposition    > 2 UTI's in last year    Additional Information    Negative: Shock suspected (e.g., cold/pale/clammy skin, too weak to stand, low BP, rapid pulse)    Negative: Sounds like a life-threatening emergency to the triager    Negative: Followed a genital area injury    Negative: Followed a genital area injury (penis, scrotum)    Negative: Vaginal discharge    Negative: Pus (white, yellow) or bloody discharge from end of penis    Negative: [1] Taking antibiotic for urinary tract infection (UTI) AND [2] female    Negative: [1] Taking antibiotic for urinary tract infection (UTI) AND [2] male    Negative: [1] Discomfort (pain, burning or stinging) when passing urine AND [2] pregnant    Negative: [1] Discomfort (pain, burning or stinging) when passing urine AND [2] postpartum < 1 month    [1] Discomfort (pain, burning or stinging) when passing urine AND [2] female    Negative: Shock suspected (e.g., cold/pale/clammy skin, too weak to stand, low BP, rapid pulse)    Negative: Sounds like a life-threatening emergency to the triager    Negative: Followed a genital area injury    Negative: Taking antibiotic for urinary tract infection (UTI)    Negative: Pregnant    Negative: Postpartum < 1 month    Negative: [1] Unable to urinate (or only a few drops) > 4 hours AND     [2] bladder feels very full (e.g., palpable bladder or strong urge to urinate)    Negative: Patient sounds very sick or weak to the triager    Negative: [1] SEVERE pain with urination  (e.g., excruciating) AND [2] not improved after 2 hours of pain medicine and Sitz bath    Negative: Fever > 100.5 F (38.1 C)     Negative: Side (flank) or lower back pain present    Negative: Diabetes mellitus or weak immune system (e.g., HIV positive, cancer chemotherapy, transplant patient)    Negative: Bedridden (e.g., nursing home patient, CVA, chronic illness, recovering from surgery)    Negative: Artificial heart valve or artificial joint    Negative: Unusual vaginal discharge (e.g., bad smelling, yellow, green, or foamy-white)    Negative: Patient is worried about sexually transmitted disease (STD)    Negative: Possibility of pregnancy    Negative: Blood in urine (red, pink, or tea-colored)    Negative: Age > 50 years    Protocols used: URINATION PAIN - FEMALE-ADULT-AH, URINARY SYMPTOMS-ADULT-AH

## 2018-11-06 ENCOUNTER — E-VISIT (OUTPATIENT)
Dept: FAMILY MEDICINE | Facility: CLINIC | Age: 68
End: 2018-11-06
Payer: COMMERCIAL

## 2018-11-06 ENCOUNTER — TELEPHONE (OUTPATIENT)
Dept: FAMILY MEDICINE | Facility: CLINIC | Age: 68
End: 2018-11-06

## 2018-11-06 DIAGNOSIS — R30.0 DYSURIA: Primary | ICD-10-CM

## 2018-11-06 PROCEDURE — 99444 ZZC PHYSICIAN ONLINE EVALUATION & MANAGEMENT SERVICE: CPT | Performed by: PHYSICIAN ASSISTANT

## 2018-11-06 RX ORDER — SULFAMETHOXAZOLE/TRIMETHOPRIM 800-160 MG
1 TABLET ORAL 2 TIMES DAILY
Qty: 6 TABLET | Refills: 0 | Status: SHIPPED | OUTPATIENT
Start: 2018-11-06 | End: 2019-01-31

## 2018-11-06 NOTE — MR AVS SNAPSHOT
After Visit Summary   11/6/2018    Angeli Jacobson    MRN: [id number]           Patient Information     Date Of Birth          1950        Visit Information        Provider Department      11/6/2018 8:35 AM Sepideh Burris PA-C [facility]        Today's Diagnoses     Dysuria    -  1      Care Instructions        Dysuria with Uncertain Cause (Adult)    The urethra is the tube that allows urine to pass out of the body. In a woman, the urethra is the opening above the vagina. In men, the urethra is the opening on the tip of the penis. Dysuria is the feeling of pain or burning in the urethra when passing urine.  Dysuria can be caused by anything that irritates or inflames the urethra. An infection or chemical irritation can cause this reaction. A bladder infection is the most common cause of dysuria in adults. A urine test can diagnose this. A bladder infection needs antibiotic treatment.  Soaps, lotions, colognes and feminine hygiene products can cause dysuria. So can birth control jellies, creams, and foams. It will go away 1 to 3 days after using these irritants.  Sexually transmitted diseases (STDs) such as chlamydia or gonorrhea can cause dysuria. Your healthcare provider may take a culture sample. Your provider may start you on antibiotic medicine before the culture test returns.  In women who have gone through menopause, dysuria can be from dryness in the lining of the urethra. This can be treated with hormones. Dysuria becomes long-term (chronic) when it lasts for weeks or months. You may need to see a specialist (urologist) to diagnose and treat chronic dysuria.  Home care  These home care tips may help:    Don't use any chemicals or products that you think may be causing your symptoms.    If you were given a prescription medicine, take as directed. Be sure to take it until it is all used up.    If a culture was taken, don't have sex until you have been told that it  is negative. This means you don't have an infection. Then follow your healthcare provider's advice to treat your condition.  If a culture was done and it is positive:    Both you and your sexual partner may need to be treated. This is true even if your partner has no symptoms.    Contact your healthcare provider or go to an urgent care clinic or the public health department to be looked at and treated.    Don't have sex until both you and your partner(s) have finished all antibiotics and your healthcare provider says you are no longer contagious.    Learn about and use safe sex practices. The safest sex is with a partner who has tested negative and only has sex with you. Condoms can prevent STDs from spreading, but they aren't a guarantee.  Follow-up care  Follow up with your healthcare provider, or as advised. If a culture was taken, you may call as directed for the results. If you have an STD, follow up with your provider or the public health department for a complete STD screening, including HIV testing. For more information, contact CDC-INFO at 618-573-7567.  When to seek medical advice  Call your healthcare provider right away if any of these occur:    You aren't better after 3 days of treatment    Fever of 100.4 F (38 C) or higher, or as directed by your healthcare provider    Back or belly pain that gets worse    You can't urinate because of pain    New discharge from the urethra, vagina, or penis    Painful sores on the penis    Rash or joint pain    Painful lumps (lymph nodes) in the groin    Testicle pain or swelling of the scrotum  Date Last Reviewed: 11/1/2016 2000-2018 [employer]. [address] [place] [address]. All rights reserved. This information is not intended as a substitute for professional medical care. Always follow your healthcare professional's instructions.                Follow-ups after your visit        Who to contact     If you have questions or need follow  up information about today's clinic visit or your schedule please contact [facility] directly at [phone].  Normal or non-critical lab and imaging results will be communicated to you by ChirpVisionhart, letter or phone within 4 business days after the clinic has received the results. If you do not hear from us within 7 days, please contact the clinic through ChirpVisionhart or phone. If you have a critical or abnormal lab result, we will notify you by phone as soon as possible.  Submit refill requests through [employer] or call your pharmacy and they will forward the refill request to us. Please allow 3 business days for your refill to be completed.          Additional Information About Your Visit        ChirpVisionhart Information     [employer] gives you secure access to your electronic health record. If you see a primary care provider, you can also send messages to your care team and make appointments. If you have questions, please call your primary care clinic.  If you do not have a primary care provider, please call [phone] and they will assist you.        Care EveryWhere ID     This is your Care EveryWhere ID. This could be used by other organizations to access your [place] medical records  [id number]        Your Vitals Were     Last Period                   03/18/2005            Blood Pressure from Last 3 Encounters:   09/05/18 113/79   08/28/18 112/72   08/01/18 110/68    Weight from Last 3 Encounters:   08/28/18 149 lb (67.6 kg)   08/01/18 154 lb (69.9 kg)   05/22/18 154 lb (69.9 kg)              Today, you had the following     No orders found for display         Today's Medication Changes          These changes are accurate as of 11/6/18 10:36 AM.  If you have any questions, ask your nurse or doctor.               Start taking these medicines.        Dose/Directions    sulfamethoxazole-trimethoprim 800-160 MG per tablet   Commonly known as:  BACTRIM DS/SEPTRA DS   Used for:  Dysuria   Started by:  [place]  Sepideh MEYER PA-C        Dose:  1 tablet   Take 1 tablet by mouth 2 times daily for 3 days   Quantity:  6 tablet   Refills:  0            Where to get your medicines      These medications were sent to [facility] [address]  [address] [address]     Phone:  [phone]     sulfamethoxazole-trimethoprim 800-160 MG per tablet                Primary Care Provider Office Phone # Fax #    Sepideh Burris PA-C [phone] [address]  [facility] [address]        Equal Access to Services     [facility]: Hadii aad ku hadasho Soomaali, waaxda luqadaha, qaybta kaalmada adeegyada, radames boudreaux hayaan maritza hatch . So [facility] [phone].    ATENCIÓN: Si habla español, tiene a carver disposición servicios gratuitos de asistencia lingüística. [facility] [phone].    We comply with applicable federal civil rights laws and [place] laws. We do not discriminate on the basis of race, color, national origin, age, disability, sex, sexual orientation, or gender identity.            Thank you!     Thank you for choosing [facility]  for your care. Our goal is always to provide you with excellent care. Hearing back from our patients is one way we can continue to improve our services. Please take a few minutes to complete the written survey that you may receive in the mail after your visit with us. Thank you!             Your Updated Medication List - Protect others around you: Learn how to safely use, store and throw away your medicines at www.disposemymeds.org.          This list is accurate as of 11/6/18 10:36 AM.  Always use your most recent med list.                   Brand Name Dispense Instructions for use Diagnosis    alendronate 70 MG tablet    FOSAMAX    12 tablet    Take 1 tablet (70 mg) by mouth with 8oz water every 7 days 30 minutes before breakfast and remain upright during this time.    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)       ezetimibe 10 MG tablet    ZETIA    90 tablet    Take 0.5 tablets (5 mg) by mouth daily    Hyperlipidemia LDL goal <70       fluticasone 50 MCG/ACT spray    FLONASE    1 Bottle    Spray 1-2 sprays into both nostrils daily    Seasonal allergic rhinitis, unspecified chronicity, unspecified trigger       Multi-vitamin Tabs tablet   Generic drug:  multivitamin, therapeutic with minerals     30    1 TABLET DAILY    Routine general medical examination at a health care facility       order for DME     2 Device    Equipment being ordered: lymphedema bandages    Personal history of malignant neoplasm of other site, Other lymphedema       order for DME     1 each    Equipment being ordered: Left Thigh High Compression Stocking, 550 CCL.3    Myxoid liposarcoma (H)       rivaroxaban ANTICOAGULANT 20 MG Tabs tablet    XARELTO    90 tablet    Take 1 tablet (20 mg) by mouth daily (with dinner) Start after completing 15 mg tablets    Vascular graft occlusion, initial encounter (H)       rosuvastatin 40 MG tablet    CRESTOR    90 tablet    Take 1 tablet (40 mg) by mouth At Bedtime    Hyperlipidemia LDL goal <70       sulfamethoxazole-trimethoprim 800-160 MG per tablet    BACTRIM DS/SEPTRA DS    6 tablet    Take 1 tablet by mouth 2 times daily for 3 days    Dysuria

## 2018-11-06 NOTE — TELEPHONE ENCOUNTER
Pt informed needs visit.Agrees to request e-visit. Discussed tips where to find this in MyChart.   Jameson Price RN

## 2018-11-06 NOTE — PATIENT INSTRUCTIONS
Dysuria with Uncertain Cause (Adult)    The urethra is the tube that allows urine to pass out of the body. In a woman, the urethra is the opening above the vagina. In men, the urethra is the opening on the tip of the penis. Dysuria is the feeling of pain or burning in the urethra when passing urine.  Dysuria can be caused by anything that irritates or inflames the urethra. An infection or chemical irritation can cause this reaction. A bladder infection is the most common cause of dysuria in adults. A urine test can diagnose this. A bladder infection needs antibiotic treatment.  Soaps, lotions, colognes and feminine hygiene products can cause dysuria. So can birth control jellies, creams, and foams. It will go away 1 to 3 days after using these irritants.  Sexually transmitted diseases (STDs) such as chlamydia or gonorrhea can cause dysuria. Your healthcare provider may take a culture sample. Your provider may start you on antibiotic medicine before the culture test returns.  In women who have gone through menopause, dysuria can be from dryness in the lining of the urethra. This can be treated with hormones. Dysuria becomes long-term (chronic) when it lasts for weeks or months. You may need to see a specialist (urologist) to diagnose and treat chronic dysuria.  Home care  These home care tips may help:    Don't use any chemicals or products that you think may be causing your symptoms.    If you were given a prescription medicine, take as directed. Be sure to take it until it is all used up.    If a culture was taken, don't have sex until you have been told that it is negative. This means you don't have an infection. Then follow your healthcare provider's advice to treat your condition.  If a culture was done and it is positive:    Both you and your sexual partner may need to be treated. This is true even if your partner has no symptoms.    Contact your healthcare provider or go to an urgent care clinic or the  [facility] health department to be looked at and treated.    Don't have sex until both you and your partner(s) have finished all antibiotics and your healthcare provider says you are no longer contagious.    Learn about and use safe sex practices. The safest sex is with a partner who has tested negative and only has sex with you. Condoms can prevent STDs from spreading, but they aren't a guarantee.  Follow-up care  Follow up with your healthcare provider, or as advised. If a culture was taken, you may call as directed for the results. If you have an STD, follow up with your provider or the public health department for a complete STD screening, including HIV testing. For more information, contact CDC-INFO at 695-254-7784.  When to seek medical advice  Call your healthcare provider right away if any of these occur:    You aren't better after 3 days of treatment    Fever of 100.4 F (38 C) or higher, or as directed by your healthcare provider    Back or belly pain that gets worse    You can't urinate because of pain    New discharge from the urethra, vagina, or penis    Painful sores on the penis    Rash or joint pain    Painful lumps (lymph nodes) in the groin    Testicle pain or swelling of the scrotum  Date Last Reviewed: 11/1/2016 2000-2018 [employer]. [address] [place] [address], [place] [address]. All rights reserved. This information is not intended as a substitute for professional medical care. Always follow your healthcare professional's instructions.

## 2018-11-06 NOTE — TELEPHONE ENCOUNTER
Patient does not have a UTI, but wants to get an RX for going out of town in case she does get a UTI

## 2018-11-28 ENCOUNTER — HOSPITAL ENCOUNTER (OUTPATIENT)
Dept: MAMMOGRAPHY | Facility: CLINIC | Age: 68
Discharge: HOME | End: 2018-11-28
Attending: PHYSICIAN ASSISTANT | Admitting: PHYSICIAN ASSISTANT
Payer: MEDICARE

## 2018-11-28 DIAGNOSIS — Z12.31 VISIT FOR SCREENING MAMMOGRAM: ICD-10-CM

## 2018-11-28 PROCEDURE — 77063 BREAST TOMOSYNTHESIS BI: CPT

## 2018-11-30 ENCOUNTER — OFFICE VISIT (OUTPATIENT)
Dept: URGENT CARE | Facility: URGENT CARE | Age: 68
End: 2018-11-30
Payer: COMMERCIAL

## 2018-11-30 ENCOUNTER — TELEPHONE (OUTPATIENT)
Dept: FAMILY MEDICINE | Facility: CLINIC | Age: 68
End: 2018-11-30

## 2018-11-30 VITALS
HEART RATE: 72 BPM | DIASTOLIC BLOOD PRESSURE: 80 MMHG | SYSTOLIC BLOOD PRESSURE: 134 MMHG | TEMPERATURE: 96.7 F | OXYGEN SATURATION: 97 %

## 2018-11-30 DIAGNOSIS — N30.00 ACUTE CYSTITIS WITHOUT HEMATURIA: Primary | ICD-10-CM

## 2018-11-30 LAB

## 2018-11-30 PROCEDURE — 87086 URINE CULTURE/COLONY COUNT: CPT | Performed by: PHYSICIAN ASSISTANT

## 2018-11-30 PROCEDURE — 99214 OFFICE O/P EST MOD 30 MIN: CPT | Performed by: PHYSICIAN ASSISTANT

## 2018-11-30 PROCEDURE — 87088 URINE BACTERIA CULTURE: CPT | Performed by: PHYSICIAN ASSISTANT

## 2018-11-30 PROCEDURE — 81001 URINALYSIS AUTO W/SCOPE: CPT | Performed by: PHYSICIAN ASSISTANT

## 2018-11-30 RX ORDER — CEPHALEXIN 500 MG/1
500 CAPSULE ORAL 2 TIMES DAILY
Qty: 14 CAPSULE | Refills: 0 | Status: SHIPPED | OUTPATIENT
Start: 2018-11-30 | End: 2019-01-31

## 2018-11-30 NOTE — TELEPHONE ENCOUNTER
Pt calls, c/o uti symptoms again, onset today, had one earlier this month and in October, recommend UC if sxs persist, best to f/u with [facility] if another UTI since appears to be recurrent issue  Franci Hanson RN, BSN  Message handled by Nurse Triage.

## 2018-11-30 NOTE — MR AVS SNAPSHOT
After Visit Summary   11/30/2018    Angeli Jacobson    MRN: [id number]           Patient Information     Date Of Birth          1950        Visit Information        Provider Department      11/30/2018 3:45 PM Irena Costello PA-C [facility]        Today's Diagnoses     Acute cystitis without hematuria    -  1       Follow-ups after your visit        Your next 10 appointments already scheduled     Jan 30, 2019  1:00 PM CST   US LOWER EXTREMITY ARTERIAL DUPLEX LEFT with RHUS1   [facility] ([facility])    [address] [facility] [address]   [phone]           How do I prepare for my exam? (Food and drink instructions) No Food and Drink Restrictions.  How do I prepare for my exam? (Other instructions) You do not need to do anything special to prepare for your exam.  What should I wear: Wear comfortable clothes.  How long does the exam take: Most ultrasounds take 30 to 60 minutes.  What should I bring: Bring a list of your medicines, including vitamins, minerals and over-the-counter drugs. It is safest to leave personal items at home.  Do I need a :  No  is needed.  What do I need to tell my doctor: Tell your doctor about any allergies you may have.  What should I do after the exam: No restrictions, You may resume normal activities.  What is this test: An ultrasound uses sound waves to make pictures of the body. Sound waves do not cause pain. The only discomfort may be the pressure of the wand against your skin or full bladder.  Who should I call with questions: If you have any questions, please call the Imaging Department where you will have your exam. Directions, parking instructions, and other information is available on our website, [email or web address].            Jan 30, 2019  1:40 PM CST   US ADRIAN DOPPLER WITH EXERCISE BILATERAL with RHUS1   [facility] ([facility])    [address]  [facility] [address]   [phone]           How do I prepare for my exam? (Food and drink instructions) No caffeine or tobacco for 1 hour prior to exam.  What should I wear: Wear comfortable clothes.  How long does the exam take: Most ultrasounds take 30 to 60 minutes.  What should I bring: Bring a list of your medicines, including vitamins, minerals and over-the-counter drugs. It is safest to leave personal items at home.  Do I need a :  No  is needed.  What do I need to tell my doctor: Tell your doctor about any allergies you may have.  What should I do after the exam: No restrictions, You may resume normal activities.  What is this test: An ultrasound uses sound waves to make pictures of the body. Sound waves do not cause pain. The only discomfort may be the pressure of the wand against your skin or full bladder.  Who should I call with questions: If you have any questions, please call the Imaging Department where you will have your exam. Directions, parking instructions, and other information is available on our website, [email or web address].            Jan 30, 2019  2:30 PM CST   Return Visit with Shade Owens MD   [facility]    [address] [facility] [address]  [facility] [id number] [phone]              Who to contact     If you have questions or need follow up information about today's clinic visit or your schedule please contact [facility] directly at [phone].  Normal or non-critical lab and imaging results will be communicated to you by MyChart, letter or phone within 4 business days after the clinic has received the results. If you do not hear from us within 7 days, please contact the clinic through Angella Joyhart or phone. If you have a critical or abnormal lab result, we will notify you by phone as soon as possible.  Submit refill requests through [employer] or call your pharmacy and they will  forward the refill request to us. Please allow 3 business days for your refill to be completed.          Additional Information About Your Visit        ParkAround.comhart Information     [employer] gives you secure access to your electronic health record. If you see a primary care provider, you can also send messages to your care team and make appointments. If you have questions, please call your primary care clinic.  If you do not have a primary care provider, please call [phone] and they will assist you.        Care EveryWhere ID     This is your Care EveryWhere ID. This could be used by other organizations to access your [place] medical records  [id number]        Your Vitals Were     Pulse Temperature Last Period Pulse Oximetry          72 96.7  F (35.9  C) (Tympanic) 03/18/2005 97%         Blood Pressure from Last 3 Encounters:   11/30/18 134/80   09/05/18 113/79   08/28/18 112/72    Weight from Last 3 Encounters:   08/28/18 149 lb (67.6 kg)   08/01/18 154 lb (69.9 kg)   05/22/18 154 lb (69.9 kg)              We Performed the Following     UA reflex to Microscopic and Culture     Urine Culture Aerobic Bacterial     Urine Microscopic          Today's Medication Changes          These changes are accurate as of 11/30/18  4:25 PM.  If you have any questions, ask your nurse or doctor.               Start taking these medicines.        Dose/Directions    cephALEXin 500 MG capsule   Commonly known as:  KEFLEX   Used for:  Acute cystitis without hematuria   Started by:  Irena Costello PA-C        Dose:  500 mg   Take 1 capsule (500 mg) by mouth 2 times daily for 7 days   Quantity:  14 capsule   Refills:  0            Where to get your medicines      These medications were sent to [facility] - [address] [facility]   [address] [facility] [address]  [address], [place] [address]     Phone:  [phone]     cephALEXin 500 MG capsule                Primary Care Provider Office Phone # Fax #     Sepideh Burris PA-C [phone]       [address] [facility] [address]        Equal Access to Services     KERON OCASIO : Judit corazon gil magalys [place], wawayneda mio, qadylonta kalazarada bashir, radames cruzboone yenni. So [facility] [phone].    ATENCIÓN: Si habla español, tiene a carver disposición servicios gratuitos de asistencia lingüística. Llame al [phone].    We comply with applicable federal civil rights laws and [place] laws. We do not discriminate on the basis of race, color, national origin, age, disability, sex, sexual orientation, or gender identity.            Thank you!     Thank you for choosing [facility]  for your care. Our goal is always to provide you with excellent care. Hearing back from our patients is one way we can continue to improve our services. Please take a few minutes to complete the written survey that you may receive in the mail after your visit with us. Thank you!             Your Updated Medication List - Protect others around you: Learn how to safely use, store and throw away your medicines at www.disposemymeds.org.          This list is accurate as of 11/30/18  4:25 PM.  Always use your most recent med list.                   Brand Name Dispense Instructions for use Diagnosis    alendronate 70 MG tablet    FOSAMAX    12 tablet    Take 1 tablet (70 mg) by mouth with 8oz water every 7 days 30 minutes before breakfast and remain upright during this time.    Age-related osteoporosis without current pathological fracture       buPROPion 300 MG 24 hr tablet    WELLBUTRIN XL    90 tablet    Take 1 tablet (300 mg) by mouth every morning    Major depression in complete remission (H)       cephALEXin 500 MG capsule    KEFLEX    14 capsule    Take 1 capsule (500 mg) by mouth 2 times daily for 7 days    Acute cystitis without hematuria       ezetimibe 10 MG tablet    ZETIA    90 tablet    Take 0.5 tablets (5 mg) by mouth daily     Hyperlipidemia LDL goal <70       fluticasone 50 MCG/ACT nasal spray    FLONASE    1 Bottle    Spray 1-2 sprays into both nostrils daily    Seasonal allergic rhinitis, unspecified chronicity, unspecified trigger       Multi-vitamin tablet   Generic drug:  multivitamin w/minerals     30    1 TABLET DAILY    Routine general medical examination at a health care facility       order for DME     2 Device    Equipment being ordered: lymphedema bandages    Personal history of malignant neoplasm of other site, Other lymphedema       order for DME     1 each    Equipment being ordered: Left Thigh High Compression Stocking, 550 CCL.3    Myxoid liposarcoma (H)       rivaroxaban ANTICOAGULANT 20 MG Tabs tablet    XARELTO    90 tablet    Take 1 tablet (20 mg) by mouth daily (with dinner) Start after completing 15 mg tablets    Vascular graft occlusion, initial encounter (H)       rosuvastatin 40 MG tablet    CRESTOR    90 tablet    Take 1 tablet (40 mg) by mouth At Bedtime    Hyperlipidemia LDL goal <70

## 2018-11-30 NOTE — PROGRESS NOTES
SUBJECTIVE:   Angeli Jacobson is a 68 year old female who  presents today for a possible UTI. Symptoms of dysuria and frequency have been going on for 1day(s).  Hematuria no.  gradual onsetand moderate.  There is no history of fever, chills, nausea or vomiting.  No history of vaginal or penile discharge. This patient does have a history of urinary tract infections. Patient denies long duration, rigors, flank pain and temperature > 101 degrees F. 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    CHRONIC LEFT THIGH LYMPHEDEMA     Nontoxic multinodular goiter 2005    needs yearly      Osteoporosis, unspecified 2001     Other lymphedema 2000    left thigh, gets regular PT for this     PAD (peripheral artery disease) (H) 4/20/2018     SHINGLES 2001     Sprain of lumbosacral (joint) (ligament) 1995    right     Unspecified hearing loss 1998    chronic tinnitus     Unspecified tinnitus 1998     Current Outpatient Prescriptions   Medication Sig Dispense Refill     alendronate (FOSAMAX) 70 MG tablet Take 1 tablet (70 mg) by mouth with 8oz water every 7 days 30 minutes before breakfast and remain upright during this time. 12 tablet 3     buPROPion (WELLBUTRIN XL) 300 MG 24 hr tablet Take 1 tablet (300 mg) by mouth every morning 90 tablet 1     ezetimibe (ZETIA) 10 MG tablet Take 0.5 tablets (5 mg) by mouth daily 90 tablet 1     fluticasone (FLONASE) 50 MCG/ACT spray Spray 1-2 sprays into both nostrils daily 1 Bottle 11      MULTI-VITAMIN OR TABS 1 TABLET DAILY 30 prn     order for DME Equipment being ordered: Left Thigh High Compression Stocking, 550 CCL.3 1 each 99     ORDER FOR DME Equipment being ordered: lymphedema bandages 2 Device 1     rivaroxaban ANTICOAGULANT (XARELTO) 20 MG TABS tablet Take 1 tablet (20 mg) by mouth daily (with dinner) Start after completing 15 mg tablets 90 tablet 1     rosuvastatin (CRESTOR) 40 MG tablet Take 1 tablet (40 mg) by mouth At Bedtime 90 tablet 1     Social History   Substance Use Topics     Smoking status: Never Smoker     Smokeless tobacco: Never Used     Alcohol use No       ROS:   Review of systems negative except as stated above.    OBJECTIVE:  /80 (BP Location: Right arm, Patient Position: Chair, Cuff Size: Adult Regular)  Pulse 72  Temp 96.7  F (35.9  C) (Tympanic)  LMP 03/18/2005  SpO2 97%  GENERAL APPEARANCE: healthy, alert and no distress  RESP: lungs clear to auscultation - no rales, rhonchi or wheezes  CV: regular rates and rhythm, normal S1 S2, no murmur noted  ABDOMEN:  soft, nontender, no HSM or masses and bowel sounds normal  BACK: No CVA tenderness  SKIN: no suspicious lesions or rashes    Results for orders placed or performed in visit on 11/30/18   UA reflex to Microscopic and Culture   Result Value Ref Range    Color Urine Yellow     Appearance Urine Clear     Glucose Urine Negative NEG^Negative mg/dL    Bilirubin Urine Negative NEG^Negative    Ketones Urine Negative NEG^Negative mg/dL    Specific Gravity Urine 1.015 1.003 - 1.035    Blood Urine Trace (A) NEG^Negative    pH Urine 7.0 5.0 - 7.0 pH    Protein Albumin Urine Negative NEG^Negative mg/dL    Urobilinogen Urine 0.2 0.2 - 1.0 EU/dL    Nitrite Urine Negative NEG^Negative    Leukocyte Esterase Urine Moderate (A) NEG^Negative    Source Midstream Urine    Urine Microscopic   Result Value Ref Range    WBC Urine 25-50 (A) OTO5^0 - 5 /HPF    RBC Urine O - 2 OTO2^O - 2 /HPF    Squamous Epithelial /LPF Urine Few  FEW^Few /LPF    Bacteria Urine Many (A) NEG^Negative /HPF       ASSESSMENT / PLAN:  1. Acute cystitis without hematuria  Drink plenty of fluids.  Prevention and treatment of UTI's discussed.Signs and symptoms of pyelonephritis mentioned.  Follow up with primary care physician if not improving   UA reflex to Microscopic and Culture  - Urine Microscopic  - Urine Culture Aerobic Bacterial  - cephALEXin (KEFLEX) 500 MG capsule; Take 1 capsule (500 mg) by mouth 2 times daily for 7 days  Dispense: 14 capsule; Refill: 0    I have discussed the patient's diagnosis and my plan of treatment with the patient. We went over any labs or imaging. Patient is aware to come back in with worsening symptoms or if no relief despite treatment plan.  Patient verbalizes understanding. All questions were addressed and answered.   Irena Costello PA-C

## 2018-12-02 LAB
BACTERIA SPEC CULT: ABNORMAL
SPECIMEN SOURCE: ABNORMAL

## 2018-12-10 ENCOUNTER — MYC MEDICAL ADVICE (OUTPATIENT)
Dept: FAMILY MEDICINE | Facility: CLINIC | Age: 68
End: 2018-12-10

## 2018-12-12 ENCOUNTER — HOSPITAL ENCOUNTER (OUTPATIENT)
Dept: ULTRASOUND IMAGING | Facility: CLINIC | Age: 68
End: 2018-12-12
Attending: SURGERY
Payer: MEDICARE

## 2018-12-12 ENCOUNTER — HOSPITAL ENCOUNTER (OUTPATIENT)
Dept: ULTRASOUND IMAGING | Facility: CLINIC | Age: 68
Discharge: HOME | End: 2018-12-12
Attending: SURGERY | Admitting: SURGERY
Payer: MEDICARE

## 2018-12-12 DIAGNOSIS — I73.9 PAD (PERIPHERAL ARTERY DISEASE) (H): ICD-10-CM

## 2018-12-12 PROCEDURE — 93926 LOWER EXTREMITY STUDY: CPT | Mod: LT

## 2018-12-12 PROCEDURE — 93924 LWR XTR VASC STDY BILAT: CPT

## 2018-12-13 ENCOUNTER — MYC MEDICAL ADVICE (OUTPATIENT)
Dept: OBGYN | Facility: CLINIC | Age: 68
End: 2018-12-13

## 2018-12-13 ENCOUNTER — OFFICE VISIT (OUTPATIENT)
Dept: OBGYN | Facility: CLINIC | Age: 68
End: 2018-12-13
Payer: COMMERCIAL

## 2018-12-13 ENCOUNTER — OFFICE VISIT (OUTPATIENT)
Dept: SURGERY | Facility: CLINIC | Age: 68
End: 2018-12-13
Payer: COMMERCIAL

## 2018-12-13 VITALS
DIASTOLIC BLOOD PRESSURE: 68 MMHG | HEIGHT: 67 IN | SYSTOLIC BLOOD PRESSURE: 122 MMHG | OXYGEN SATURATION: 95 % | BODY MASS INDEX: 23.86 KG/M2 | WEIGHT: 152 LBS | HEART RATE: 80 BPM | RESPIRATION RATE: 16 BRPM

## 2018-12-13 VITALS — BODY MASS INDEX: 24.17 KG/M2 | WEIGHT: 152 LBS | SYSTOLIC BLOOD PRESSURE: 126 MMHG | DIASTOLIC BLOOD PRESSURE: 68 MMHG

## 2018-12-13 DIAGNOSIS — N81.11 CYSTOCELE, MIDLINE: ICD-10-CM

## 2018-12-13 DIAGNOSIS — N81.4 UTERINE PROLAPSE: Primary | ICD-10-CM

## 2018-12-13 DIAGNOSIS — N30.00 ACUTE CYSTITIS WITHOUT HEMATURIA: ICD-10-CM

## 2018-12-13 DIAGNOSIS — I73.9 PAD (PERIPHERAL ARTERY DISEASE) (H): Primary | ICD-10-CM

## 2018-12-13 DIAGNOSIS — K59.04 CHRONIC IDIOPATHIC CONSTIPATION: ICD-10-CM

## 2018-12-13 DIAGNOSIS — R39.15 URINARY URGENCY: ICD-10-CM

## 2018-12-13 DIAGNOSIS — R10.2 PELVIC PRESSURE IN FEMALE: ICD-10-CM

## 2018-12-13 LAB

## 2018-12-13 PROCEDURE — 81001 URINALYSIS AUTO W/SCOPE: CPT | Performed by: OBSTETRICS & GYNECOLOGY

## 2018-12-13 PROCEDURE — 99203 OFFICE O/P NEW LOW 30 MIN: CPT | Performed by: OBSTETRICS & GYNECOLOGY

## 2018-12-13 PROCEDURE — 99204 OFFICE O/P NEW MOD 45 MIN: CPT | Performed by: SURGERY

## 2018-12-13 ASSESSMENT — ANXIETY QUESTIONNAIRES
5. BEING SO RESTLESS THAT IT IS HARD TO SIT STILL: NOT AT ALL
IF YOU CHECKED OFF ANY PROBLEMS ON THIS QUESTIONNAIRE, HOW DIFFICULT HAVE THESE PROBLEMS MADE IT FOR YOU TO DO YOUR WORK, TAKE CARE OF THINGS AT HOME, OR GET ALONG WITH OTHER PEOPLE: NOT DIFFICULT AT ALL
1. FEELING NERVOUS, ANXIOUS, OR ON EDGE: NOT AT ALL
3. WORRYING TOO MUCH ABOUT DIFFERENT THINGS: NOT AT ALL
2. NOT BEING ABLE TO STOP OR CONTROL WORRYING: NOT AT ALL
6. BECOMING EASILY ANNOYED OR IRRITABLE: NOT AT ALL
7. FEELING AFRAID AS IF SOMETHING AWFUL MIGHT HAPPEN: NOT AT ALL
GAD7 TOTAL SCORE: 0

## 2018-12-13 ASSESSMENT — PATIENT HEALTH QUESTIONNAIRE - PHQ9
SUM OF ALL RESPONSES TO PHQ QUESTIONS 1-9: 0
5. POOR APPETITE OR OVEREATING: NOT AT ALL

## 2018-12-13 ASSESSMENT — MIFFLIN-ST. JEOR: SCORE: 1244.16

## 2018-12-13 NOTE — PROGRESS NOTES
SUBJECTIVE:                                                   Angeli Jacobson is a 68 year old female who presents to clinic today for the following health issue(s):  Patient presents with:  Consult: discuss prolapse      HPI:  NP    Has prolapsed uterus, first noticed about 1yr ago. Over this time, has worsened. At first would recede when lying, but now is there all the time. The last week has been uncomfortable, felt pressure. Not painful. No discharge/bleeding. SA, does not cause issues.     2018. Has talked about pessaries with a GYN in [place], tried for some time. Felt uncomfortable, difficult to remove. For example, had for one week and when returned tried different size and could not get it out. Was round, maybe a donut. Stopped after the one week.     Wants to talk about options. Does not want to do pessaries again.     Gets UTIs often. Last treated w/in 2wks. Felt like it went away.   Does have some urinary leakage with urgency, wears a pad sometimes. Tries to schedule her urination. Has seen Urology in past was on estrace for a little while and then stopped after had blood clot. Was not sure the estrace helped anyway.     Has longstanding hx constipation. BMs 3-4x/wk.  Has tried metamucil-doing this lately, makes smoothie in am, and miralax-maybe did this for 3wks. Doesn't feel these made her regular. Drinks water.  Has talked with her PCP about this in past.     Hx 3 , largest 8lb3oz, otherse 8lb. No operative delivery, no big tears she remembers.     Reviewed epic recordsl   Lov simple cyst 2cm. Prev Rov cyst 3.6cm.       Review of PMH, SocHx, SurHx, FHx, medications completed. Epic updated.      Patient's last menstrual period was 2005..   Patient is sexually active, .  Using menopause for contraception.    reports that  has never smoked. she has never used smokeless tobacco.    STD testing offered?  Declined    Health maintenance updated:  yes    Today's PHQ-2 Score:    PHQ-2 ( 1999 [employer]) 8/28/2018   Q1: Little interest or pleasure in doing things 0   Q2: Feeling down, depressed or hopeless 0   PHQ-2 Score 0   Q1: Little interest or pleasure in doing things Not at all   Q2: Feeling down, depressed or hopeless Not at all   PHQ-2 Score 0     Today's PHQ-9 Score:   PHQ-9 SCORE 12/13/2018   PHQ-9 Total Score -   PHQ-9 Total Score MyChart -   PHQ-9 Total Score 0     Today's CYNDI-7 Score:   CYNDI-7 SCORE 12/13/2018   Total Score 0       Problem list and histories reviewed & adjusted, as indicated.  Additional history: as documented.    Patient Active Problem List   Diagnosis     Pain in joint, multiple sites     MULTINODUL GOITER     Hearing loss     Hyperlipidemia LDL goal <70     Osteoporosis     Cervicalgia     Major depressive disorder, recurrent episode, moderate (H)     Impaired fasting glucose     Advanced directives, counseling/discussion     Lymphedema of left leg     Tubular adenoma     Other constipation     Vertigo     Myxoid liposarcoma (HCC)     PAD (peripheral artery disease) (H)     Vascular graft occlusion (H)     Past Surgical History:   Procedure Laterality Date     C APPENDECTOMY      Appendectomy     C NONSPECIFIC PROCEDURE  04/2000    Open Biopsy Left Thigh Liposarcoma     COLONOSCOPY N/A 2/5/2016    Procedure: COMBINED COLONOSCOPY, SINGLE OR MULTIPLE BIOPSY/POLYPECTOMY BY BIOPSY;  Surgeon: Varun Stanley MD, MD;  Location: [facility]     EXCISION MALIG LESION>1.25CM  5/2000    Myxoid Liposarcoma       EXPLORE GROIN Right 5/1/2018    Procedure: EXPLORE GROIN;  EMERGENCY RIGHT FEMORAL EXPLORATION WITH FEMORAL ARTERY REPAIR.    EBL: 50mL;  Surgeon: Shade Owens MD;  Location: [facility]     HC COLONOSCOPY THRU STOMA, DIAGNOSTIC  2006 due 2010     HC COLP CERVIX/UPPER VAGINA  07/1997    Negative     HC DILATION/CURETTAGE DIAG/THER NON OB  02/1997    Post menopausal bleeding on HRT, negative      Social History     Tobacco Use     Smoking status: Never Smoker      Smokeless tobacco: Never Used   Substance Use Topics     Alcohol use: No      Problem (# of Occurrences) Relation (Name,Age of Onset)    Alcohol/Drug (1) Father: etoh    C.A.D. (3) Father: MI 57, Paternal Grandmother: ascvd, Paternal Uncle: Mi  age 48    Cancer (2) Maternal Grandmother, Maternal Aunt: pancreatic CA    Colon Cancer (1) Brother (70)    Diabetes (1) Maternal Grandmother    Hyperlipidemia (2) Son, Son: very high, experimental drug    Obesity (1) Mother    Osteoporosis (1) Mother            Current Outpatient Medications   Medication Sig     alendronate (FOSAMAX) 70 MG tablet Take 1 tablet (70 mg) by mouth with 8oz water every 7 days 30 minutes before breakfast and remain upright during this time.     buPROPion (WELLBUTRIN XL) 300 MG 24 hr tablet Take 1 tablet (300 mg) by mouth every morning     ezetimibe (ZETIA) 10 MG tablet Take 0.5 tablets (5 mg) by mouth daily     fluticasone (FLONASE) 50 MCG/ACT spray Spray 1-2 sprays into both nostrils daily     MULTI-VITAMIN OR TABS 1 TABLET DAILY     order for DME Equipment being ordered: Left Thigh High Compression Stocking, 550 CCL.3     ORDER FOR DME Equipment being ordered: lymphedema bandages     rivaroxaban ANTICOAGULANT (XARELTO) 20 MG TABS tablet Take 1 tablet (20 mg) by mouth daily (with dinner) Start after completing 15 mg tablets     rosuvastatin (CRESTOR) 40 MG tablet Take 1 tablet (40 mg) by mouth At Bedtime     No current facility-administered medications for this visit.      Allergies   Allergen Reactions     Celexa [Citalopram Hydrobromide]      Decreased libido       ROS:  12 point review of systems negative other than symptoms noted below.    OBJECTIVE:     /68   Wt 68.9 kg (152 lb)   LMP 2005   Breastfeeding? No   BMI 24.17 kg/m    Body mass index is 24.17 kg/m .    Exam:  Constitutional:  Appearance: Well nourished, well developed alert, in no acute distress  Chest:  Respiratory Effort:  Breathing  unlabored  Gastrointestinal:  Abdominal Examination:  Abdomen nontender to palpation, tone normal without rigidity or guarding, no masses present, umbilicus without lesions; Liver/Spleen:  No hepatomegaly present, liver nontender to palpation; Hernias:  No hernias present  Lymphatic: Lymph Nodes:  No other lymphadenopathy present  Skin:General Inspection:  No rashes present, no lesions present, no areas of discoloration; Genitalia and Groin:  No rashes present, no lesions present, no areas of discoloration, no masses present.  Neurologic/Psychiatric:  Mental Status:  Oriented X3   Pelvic Exam:  External Genitalia:     Normal appearance for age, no discharge present, no tenderness present, no inflammatory lesions present, color normal  Vagina:     Normal vaginal vault with with grade 2-3 cystocele, grade 1-2 cervical prolapse, grade 1 rectocele.  No discharge present, no inflammatory lesions present, no masses present  Bladder:     Nontender to palpation  Urethra:   Urethral Body:  Urethra palpation normal, urethra structural support normal   Urethral Meatus:  No erythema or lesions present  Cervix:     Appearance healthy, no lesions present, nontender to palpation, no bleeding present  Uterus:     Uterus: firm, normal sized and nontender, midplane in position.   Adnexa:     No adnexal tenderness present, no adnexal masses present  Perineum:     Perineum within normal limits, no evidence of trauma, no rashes or skin lesions present  Anus:     Anus within normal limits, no hemorrhoids present  Inguinal Lymph Nodes:     No lymphadenopathy present  Pubic Hair:     Normal pubic hair distribution for age  Genitalia and Groin:     No rashes present, no lesions present, no areas of discoloration, no masses present       In-Clinic Test Results:  Results for orders placed or performed in visit on 12/13/18 (from the past 24 hour(s))   *UA reflex to Microscopic and Culture ([facility] (except [place] and  [place])   Result Value Ref Range    Color Urine Yellow     Appearance Urine Clear     Glucose Urine Negative NEG^Negative mg/dL    Bilirubin Urine Negative NEG^Negative    Ketones Urine Negative NEG^Negative mg/dL    Specific Gravity Urine 1.025 1.003 - 1.035    Blood Urine Trace (A) NEG^Negative    pH Urine 6.0 5.0 - 7.0 pH    Protein Albumin Urine Negative NEG^Negative mg/dL    Urobilinogen Urine 0.2 0.2 - 1.0 EU/dL    Nitrite Urine Negative NEG^Negative    Leukocyte Esterase Urine 1+ (A) NEG^Negative    Source Midstream Urine    Urine Microscopic   Result Value Ref Range    WBC Urine 0 - 5 OTO5^0 - 5 /HPF    RBC Urine 2-5 (A) OTO2^O - 2 /HPF    Squamous Epithelial /LPF Urine Many (A) FEW^Few /LPF    Bacteria Urine Many (A) NEG^Negative /HPF       ASSESSMENT/PLAN:                                                        ICD-10-CM    1. Chronic idiopathic constipation K59.04 [facility]     Urine Microscopic   2. Cystocele, midline N81.11 [facility]   3. Uterine prolapse N81.4 [facility]   4. Urinary urgency R39.15 *UA reflex to Microscopic and Culture ([facility] (except [place] and [place])     [facility]   5. Acute cystitis without hematuria N30.00 *UA reflex to Microscopic and Culture ([facility] (except [place] and [place])   6. Pelvic pressure in female R10.2 US Transvaginal Non OB         -Discussed causes of prolapse and natural history as well as treatment options if desired. Pt is not overly bothered by the symptoms, was looking for some reassurance. Has not tolerated pessary in past.   Due to comorbidities and anticoagulation, rec medical and symptomatic therapy unless really cannot get relief and avoid surgery. Pt not bothered enough by prolapse that she desires surgery anyway.   -Check urine for resolution of recent UTI despite therapy.  -Check pelvic  US for pressure and hx of ovarian cyst  -Will send to pelvic PT for prolapse, urinary symptoms.  -Discussed metamucil twice daily for constipation. Discussed expectations and symptom relief-not just daily BMs as main goal. Discussed avoiding constipation as can contribute to further prolapse.  Pt happy with overall plan.     (30 minutes was spent face to face with the patient today discussing her history, diagnosis, and follow-up plan as noted above. Over 50% of the visit was spent in counseling and coordination of care.)      Denisse Tubbs Masters, [facility] [place]

## 2018-12-13 NOTE — LETTER
[facility] [address]  [place] [address]  Phone: [phone]  Fax: [phone]      2018    Re: Angeli Jacobson - 1950    67 y/o female with I week history left lower extremity claudication--she previously had an occluded PTFE interposition graft placed 18 years ago lysed in May of this year.  She has been on Xarelto since that time.  She claudicated after 1 block on the level.  ADRIAN at rest on the left is .62 which drops to .28 with exercise.  Left SFA occluded again.  Options at this point would be to try lysis again or consider bypass.  I would be in favor of the latter.  Will set up a vein mapping of her lower extremities and have Dr. Owens see her early next week.  Discussed all with the patient.      Ian Liu MD

## 2018-12-13 NOTE — TELEPHONE ENCOUNTER
Called pt regarding mychart msg. Pt states Dr. Benitez wanted to know where she had been seen before but did not need any records sent over.   Will route to provider as RACHELLE Solano RN on 12/13/2018 at 4:33 PM

## 2018-12-13 NOTE — PROGRESS NOTES
69 y/o female with I week history left lower extremity claudication--she previously had an occluded PTFE interposition graft placed 18 years ago lysed in May of this year.  She has been on Xarelto since that time.  She claudicated after 1 block on the level.  ADRIAN at rest on the left is .62 which drops to .28 with exercise.  Left SFA occluded again.  Options at this point would be to try lysis again or consider bypass.  I would be in favor of the latter.  Will set up a vein mapping of her lower extremities and have Dr. Owens see her early next week.  Discussed all with the patient.  Face to face time 45 minutes greater than 50% in consultation.

## 2018-12-14 ENCOUNTER — HOSPITAL ENCOUNTER (OUTPATIENT)
Dept: ULTRASOUND IMAGING | Facility: CLINIC | Age: 68
Discharge: HOME | End: 2018-12-14
Attending: SURGERY | Admitting: SURGERY
Payer: MEDICARE

## 2018-12-14 DIAGNOSIS — T82.898A VASCULAR GRAFT OCCLUSION, INITIAL ENCOUNTER (H): ICD-10-CM

## 2018-12-14 DIAGNOSIS — T82.898A VASCULAR GRAFT OCCLUSION, INITIAL ENCOUNTER (H): Primary | ICD-10-CM

## 2018-12-14 PROCEDURE — 93970 EXTREMITY STUDY: CPT

## 2018-12-14 ASSESSMENT — ANXIETY QUESTIONNAIRES: GAD7 TOTAL SCORE: 0

## 2018-12-17 ENCOUNTER — OFFICE VISIT (OUTPATIENT)
Dept: OTHER | Facility: CLINIC | Age: 68
DRG: 315 | End: 2018-12-17
Attending: SURGERY
Payer: MEDICARE

## 2018-12-17 ENCOUNTER — MYC MEDICAL ADVICE (OUTPATIENT)
Dept: OBGYN | Facility: CLINIC | Age: 68
End: 2018-12-17

## 2018-12-17 VITALS — HEART RATE: 79 BPM | DIASTOLIC BLOOD PRESSURE: 82 MMHG | SYSTOLIC BLOOD PRESSURE: 133 MMHG

## 2018-12-17 DIAGNOSIS — I73.9 PERIPHERAL VASCULAR DISEASE WITH CLAUDICATION (H): ICD-10-CM

## 2018-12-17 DIAGNOSIS — I89.0 LYMPHEDEMA OF LEFT LEG: ICD-10-CM

## 2018-12-17 DIAGNOSIS — T82.868A ARTERIAL GRAFT THROMBOSIS, INITIAL ENCOUNTER (H): Primary | ICD-10-CM

## 2018-12-17 PROCEDURE — 99214 OFFICE O/P EST MOD 30 MIN: CPT | Mod: ZP | Performed by: SURGERY

## 2018-12-17 PROCEDURE — G0463 HOSPITAL OUTPT CLINIC VISIT: HCPCS

## 2018-12-17 NOTE — TELEPHONE ENCOUNTER
Information given to Katy TOMPKINS to schedule pt.       -Discussed causes of prolapse and natural history as well as treatment options if desired. Pt is not overly bothered by the symptoms, was looking for some reassurance. Has not tolerated pessary in past.   Due to comorbidities and anticoagulation, rec medical and symptomatic therapy unless really cannot get relief and avoid surgery. Pt not bothered enough by prolapse that she desires surgery anyway.   -Check urine for resolution of recent UTI despite therapy.  -Check pelvic US for pressure and hx of ovarian cyst  -Will send to pelvic PT for prolapse, urinary symptoms.  -Discussed metamucil twice daily for constipation. Discussed expectations and symptom relief-not just daily BMs as main goal. Discussed avoiding constipation as can contribute to further prolapse.  Pt happy with overall plan.

## 2018-12-17 NOTE — NURSING NOTE
"Angeli Jacobson is a 68 year old female who presents for:  Chief Complaint   Patient presents with     RECHECK     Hx of thrombolysis and stenting of proximal left SFA        Vitals:    Vitals:    12/17/18 1422   BP: 133/82   BP Location: Left arm   Patient Position: Sitting   Cuff Size: Adult Regular   Pulse: 79       BMI:  Estimated body mass index is 24.17 kg/m  as calculated from the following:    Height as of 12/13/18: 5' 6.5\" (1.689 m).    Weight as of 12/13/18: 152 lb (68.9 kg).    Pain Score:  Data Unavailable        Ange Barraza"

## 2018-12-18 ENCOUNTER — OFFICE VISIT (OUTPATIENT)
Dept: FAMILY MEDICINE | Facility: CLINIC | Age: 68
End: 2018-12-18
Payer: COMMERCIAL

## 2018-12-18 ENCOUNTER — TELEPHONE (OUTPATIENT)
Dept: OTHER | Facility: CLINIC | Age: 68
End: 2018-12-18

## 2018-12-18 VITALS
RESPIRATION RATE: 12 BRPM | TEMPERATURE: 97.7 F | DIASTOLIC BLOOD PRESSURE: 68 MMHG | WEIGHT: 151 LBS | SYSTOLIC BLOOD PRESSURE: 118 MMHG | HEART RATE: 72 BPM | BODY MASS INDEX: 24.01 KG/M2

## 2018-12-18 DIAGNOSIS — I73.9 PAD (PERIPHERAL ARTERY DISEASE) (H): ICD-10-CM

## 2018-12-18 DIAGNOSIS — Z01.818 PREOP GENERAL PHYSICAL EXAM: Primary | ICD-10-CM

## 2018-12-18 LAB
ERYTHROCYTE [DISTWIDTH] IN BLOOD BY AUTOMATED COUNT: 12.7 % (ref 10–15)
HCT VFR BLD AUTO: 43.5 % (ref 35–47)
HGB BLD-MCNC: 13.7 G/DL (ref 11.7–15.7)
MCH RBC QN AUTO: 29.8 PG (ref 26.5–33)
MCHC RBC AUTO-ENTMCNC: 31.5 G/DL (ref 31.5–36.5)
MCV RBC AUTO: 95 FL (ref 78–100)
PLATELET # BLD AUTO: 253 10E9/L (ref 150–450)
RBC # BLD AUTO: 4.59 10E12/L (ref 3.8–5.2)
WBC # BLD AUTO: 6.8 10E9/L (ref 4–11)

## 2018-12-18 PROCEDURE — 80048 BASIC METABOLIC PNL TOTAL CA: CPT | Performed by: PHYSICIAN ASSISTANT

## 2018-12-18 PROCEDURE — 85027 COMPLETE CBC AUTOMATED: CPT | Performed by: PHYSICIAN ASSISTANT

## 2018-12-18 PROCEDURE — 99215 OFFICE O/P EST HI 40 MIN: CPT | Performed by: PHYSICIAN ASSISTANT

## 2018-12-18 PROCEDURE — 36415 COLL VENOUS BLD VENIPUNCTURE: CPT | Performed by: PHYSICIAN ASSISTANT

## 2018-12-18 NOTE — TELEPHONE ENCOUNTER
Type of surgery: LEFT LEG ARTERIOGRAM, POSSIBLE THROMBOLYSIS  Location of surgery: [facility] OR  Date and time of surgery: 12/19/18 1:30P  Surgeon: DR GEORGE HOLLEY  Pre-Op Appt Date: [id number]  Post-Op Appt Date: UNKNOWN   Packet sent out: GIVEN TO PT 12/17/18  Pre-cert/Authorization completed:  SENT FOR PA SUBMISSION  Date: 12/18/18 Monica Falcon -  at [facility]

## 2018-12-18 NOTE — PROGRESS NOTES
[address], [address]  [facility] [id number] [phone]  Dept: [phone]    PRE-OP EVALUATION:  Today's date: 2018    Angeli Jacobson (: 1950) presents for pre-operative evaluation assessment as requested by Dr. Jenni Delgado.  She requires evaluation and anesthesia risk assessment prior to undergoing surgery/procedure for treatment of Left Leg Arteriogram , possible Thrombolysis .    Fax number for surgical facility:   Primary Physician: Sepideh Burris  Type of Anesthesia Anticipated: General    Patient has a Health Care Directive or Living Will:  YES     Preop Questions 2018   Who is doing your surgery? dr liao   What are you having done? veinous angiogram   Date of Surgery/Procedure: 2018   Facility or Hospital where procedure/surgery will be performed: [facility]   1.  Do you have a history of Heart attack, stroke, stent, coronary bypass surgery, or other heart surgery? No   2.  Do you ever have any pain or discomfort in your chest? No   3.  Do you have a history of  Heart Failure? No   4.   Are you troubled by shortness of breath when:  walking on a level surface, or up a slight hill, or at night? NO   5.  Do you currently have a cold, bronchitis or other respiratory infection? No   6.  Do you have a cough, shortness of breath, or wheezing? No   7.  Do you sometimes get pains in the calves of your legs when you walk? No   8. Do you or anyone in your family have previous history of blood clots? No   9.  Do you or does anyone in your family have a serious bleeding problem such as prolonged bleeding following surgeries or cuts? No   10. Have you ever had problems with anemia or been told to take iron pills? No   11. Have you had any abnormal blood loss such as black, tarry or bloody stools, or abnormal vaginal bleeding? No   12. Have you ever had a blood transfusion? No   13. Have you or any of your  relatives ever had problems with anesthesia? No   14. Do you have sleep apnea, excessive snoring or daytime drowsiness? No   15. Do you have any prosthetic heart valves? No   16. Do you have prosthetic joints? No   17. Is there any chance that you may be pregnant? No         HPI:     HPI related to upcoming procedure: patient will be having procedure done on left leg to clear an occlusion in the arterial system.  She has been having fatigue and cramping in the leg.  She has stopped Xarelto as of yesterday.  She is feeling well today without concerns or complaint.    See problem list for active medical problems.  Problems all longstanding and stable, except as noted/documented.  See ROS for pertinent symptoms related to these conditions.                                                                                                                                                          .    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 She states she would want to be resuscitated in her current state of health. EDSON Monterroso RN   Advance Care Planning 8/25/2015: Receipt of ACP document:  Received: Health Care Directive which was witnessed or notarized on 4/15/2002.  Document not previously scanned.  Validation form completed and sent with document to be scanned.  Code Status needs to be updated to reflect choices in most recent ACP document. Left message for patient to return call regarding code status for current state of health. Orders placed.  Confirmed/documented designated decision maker(s).  Added by Milagros Monterroso  Patient states has Advance Directive and will bring in a copy to clinic. 7/26/2011          Impaired fasting glucose 06/16/2010     Priority: Low     Hearing loss 01/29/2007     Priority: Low     Has hearing aids       Pain in joint, multiple sites 10/27/2005     Priority: Low      Past Medical History:   Diagnosis Date     * * * SBE PROPHYLAXIS * * * 1998    Amox 500mg, take 4 tabs one hour prior to procedure.Takes this because of lymphedema secondary from leg surgey     Central serous retinopathy 2001    Resolved 9/2001     CHRONIC NECK PAIN 1995     Depressive disorder, not elsewhere classified 2001     MIXED HYPERLIPIDEMIA, LDL GOAL <160 1998    LDL goal < 160     MYXOID LIPOSARCOMA 2000    Left thigh, S/P excision, radiation  at [facility]     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    CHRONIC LEFT THIGH LYMPHEDEMA     Nontoxic multinodular goiter 2005    needs yearly      Osteoporosis, unspecified 2001     Other lymphedema 2000     left thigh, gets regular PT for this     PAD (peripheral artery disease) (H) 4/20/2018     SHINGLES 2001     Sprain of lumbosacral (joint) (ligament) 1995    right     Unspecified hearing loss 1998    chronic tinnitus     Unspecified tinnitus 1998     Past Surgical History:   Procedure Laterality Date     C APPENDECTOMY      Appendectomy     C NONSPECIFIC PROCEDURE  04/2000    Open Biopsy Left Thigh Liposarcoma     COLONOSCOPY N/A 2/5/2016    Procedure: COMBINED COLONOSCOPY, SINGLE OR MULTIPLE BIOPSY/POLYPECTOMY BY BIOPSY;  Surgeon: Varun Stanley MD, MD;  Location: [facility]     EXCISION MALIG LESION>1.25CM  5/2000    Myxoid Liposarcoma       EXPLORE GROIN Right 5/1/2018    Procedure: EXPLORE GROIN;  EMERGENCY RIGHT FEMORAL EXPLORATION WITH FEMORAL ARTERY REPAIR.    EBL: 50mL;  Surgeon: Shade Owens MD;  Location: [facility]     HC COLONOSCOPY THRU STOMA, DIAGNOSTIC  2006    due 2010     HC COLP CERVIX/UPPER VAGINA  07/1997    Negative     HC DILATION/CURETTAGE DIAG/THER NON OB  02/1997    Post menopausal bleeding on HRT, negative     Current Outpatient Medications   Medication Sig Dispense Refill     alendronate (FOSAMAX) 70 MG tablet Take 1 tablet (70 mg) by mouth with 8oz water every 7 days 30 minutes before breakfast and remain upright during this time. 12 tablet 3     buPROPion (WELLBUTRIN XL) 300 MG 24 hr tablet Take 1 tablet (300 mg) by mouth every morning 90 tablet 1     ezetimibe (ZETIA) 10 MG tablet Take 0.5 tablets (5 mg) by mouth daily 90 tablet 1     fluticasone (FLONASE) 50 MCG/ACT spray Spray 1-2 sprays into both nostrils daily 1 Bottle 11     MULTI-VITAMIN OR TABS 1 TABLET DAILY 30 prn     order for DME Equipment being ordered: Left Thigh High Compression Stocking, 550 CCL.3 1 each 99     ORDER FOR DME Equipment being ordered: lymphedema bandages 2 Device 1     rivaroxaban ANTICOAGULANT (XARELTO) 20 MG TABS tablet Take 1 tablet (20 mg) by mouth daily (with dinner) Start after completing 15 mg  tablets 90 tablet 1     rosuvastatin (CRESTOR) 40 MG tablet Take 1 tablet (40 mg) by mouth At Bedtime 90 tablet 1     OTC products: None, except as noted above    Allergies   Allergen Reactions     Celexa [Citalopram Hydrobromide]      Decreased libido      Latex Allergy: NO    Social History     Tobacco Use     Smoking status: Never Smoker     Smokeless tobacco: Never Used   Substance Use Topics     Alcohol use: No     History   Drug Use No       REVIEW OF SYSTEMS:   Constitutional, neuro, ENT, endocrine, pulmonary, cardiac, gastrointestinal, genitourinary, musculoskeletal, integument and psychiatric systems are negative, except as otherwise noted.    EXAM:   LMP 03/18/2005     GENERAL APPEARANCE: healthy, alert and no distress     EYES: EOMI, PERRL     HENT: ear canals and TM's normal and nose and mouth without ulcers or lesions     NECK: no adenopathy, no asymmetry, masses, or scars and thyroid normal to palpation     RESP: lungs clear to auscultation - no rales, rhonchi or wheezes     CV: regular rates and rhythm, normal S1 S2, no S3 or S4 and no murmur, click or rub     MS: extremities normal- no gross deformities noted, no evidence of inflammation in joints, FROM in all extremities.     SKIN: no suspicious lesions or rashes     NEURO: Normal strength and tone, sensory exam grossly normal, mentation intact and speech normal     PSYCH: mentation appears normal. and affect normal/bright     LYMPHATICS: No cervical adenopathy    DIAGNOSTICS:     Labs Drawn and in Process:   Unresulted Labs Ordered in the Past 30 Days of this Admission     Date and Time Order Name Status Description    12/18/2018 1027 BASIC METABOLIC PANEL In process           Recent Labs   Lab Test 08/28/18  1041 05/22/18  1023  05/05/18  1925  05/01/18  1700  04/30/18  0740   HGB 14.2 11.3*   < > 7.9*   < > 9.5*   < > 13.7    318   < > 239   < > 161   < > 236   INR  --   --   --   --   --  1.43*  --  0.96     --   --  143   < >  --     < >  --    POTASSIUM 4.5  --   --  3.4   < >  --    < >  --    CR 0.80  --   --  0.65   < >  --    < > 0.71   A1C  --   --   --   --   --   --   --  5.8    < > = values in this interval not displayed.      IMPRESSION:   Reason for surgery/procedure: PAD  Diagnosis/reason for consult: pre op exam    The proposed surgical procedure is considered INTERMEDIATE risk.    REVISED CARDIAC RISK INDEX  The patient has the following serious cardiovascular risks for perioperative complications such as (MI, PE, VFib and 3  AV Block):  High risk surgery (>5% cardiac complication risk)  INTERPRETATION: 1 risks: Class II (low risk - 0.9% complication rate)    The patient has the following additional risks for perioperative complications:  No identified additional risks      ICD-10-CM    1. Preop general physical exam Z01.818        RECOMMENDATIONS:     --Consult hospital rounder / IM to assist post-op medical management  --Because of DVT or PE history, patient should wear compression hose before & after surgery    --Patient is to take all scheduled medications on the day of surgery EXCEPT for modifications listed below.    Anticoagulant or Antiplatelet Medication Use  XARELTO: Bleeding risk is at least moderate for this procedure and rivaroxaban (Xarelto) should be stopped at least 24 hours prior to procedure.        APPROVAL GIVEN to proceed with proposed procedure, without further diagnostic evaluation       Signed Electronically by: Jorje Martinez PA-C    Copy of this evaluation report is provided to requesting physician.    [facility] Preop Guidelines    Revised Cardiac Risk Index

## 2018-12-18 NOTE — PROGRESS NOTES
Angeli Jacobson is a 68-year-old female who is status post resection of a proximal left thigh liposarcoma with replacement of a portion of her proximal left superficial femoral artery utilizing a 6 mm ringed PTFE graft in 2000.  She has a history of chronic left leg lymphedema.  She presented to my office in late April 2018 with a 2-1/2-month history of short distance left leg claudication.  I took her for angiography on 4/30/2018 and was easily able to cross the occluded PTFE interposition graft located in the proximal left SFA.  She underwent thrombolysis with stenting of a portion of the mid graft utilizing a Viabahn 6 mm covered stent.  She had issues with bleeding from her right femoral access site and did require open repair of the right femoral artery.  During that hospitalization we opted to place her empirically on Xarelto.    At a 3-month postoperative check she had 2+ palpable dorsalis pedis pulses bilaterally.  Resting left-sided ADRIAN was 1.16 with post exercise left ADRIAN of 1.08.    Ultrasound on 8/1/2018 showed a widely patent left SFA PTFE bypass graft.    She was in her usual state of health vacationing in [place] roughly 10 days ago.  She acutely developed some paresthesias of the left foot with recurrent left leg claudication reminiscent of her symptoms this past spring.  She contacted our office and asked for an expedited visit.  She was seen last week by my partner, Dr. Liu.  Ultrasound demonstrated an occlusion of the left SFA bypass.  Resting left-sided ADRIAN is now 0.62 decreasing to 0.28 after 3 minutes on the treadmill.  Right-sided ABIs remain normal.    She presents today with her  to discuss all the above and her treatment options.    Exam:  Well-developed female in no acute distress.  Blood pressure 133/82 with a pulse of 79.  Chronic lymphedematous changes of the entire left leg for which she wears a thigh-high compression stocking.  Palpable femoral pulses bilaterally.   Palpable right-sided dorsalis pedis pulse.  Monophasic left-sided pedal pulses.    Imaging:   Ultrasound on 12/12/2018 shows occlusion of the short proximal left SFA interposition graft which also contains a central 6 mm Viabahn stent    ASSESSMENT:  1.  18 years status post resection of proximal left thigh liposarcoma with reconstruction of the proximal left SFA using a 6 mm ringed PTFE graft.  Subsequent occlusion of that graft in May of this year with successful thrombolysis.  Placement of a 6 mm Viabahn stent in the central portion of this PTFE interposition graft.  Now with reocclusion of this graft which by history is roughly 10 days in duration.    2.  Chronic left leg lymphedema  related to the prior proximal left thigh liposarcoma resection with associated lymphadenectomy.    RECOMMENDATION:   I had a lengthy discussion with Angeli and her  reviewing all the above.  Treatment options include either left leg angiogram with a second attempt at thrombolysis versus left leg bypass.  In either event, I would want the left leg Agram to evaluate her infrageniculate runoff and to plan for open surgery.  I have concerns that the stented proximal left SFA PTFE interposition graft has failed after only 7 months despite being maintained on Xarelto and aspirin.  I am also concerned that any type of open left leg bypass will only exacerbate her chronic left leg lymphedema.      I will arrange for the left leg angiogram.  I am unable to perform this due to my operative schedule.  I will discuss things with the interventionalist.  I will asked them to probe the thrombosed graft and consider a second attempt at thrombolysis.  Ultimate treatment recommendations of either attempted repeat thrombolysis versus open revascularization will be dependent upon the results of the above-noted left leg angiogram.    Angeli and her  verbalized understanding to the above.  She will hold her Xarelto and continue her  aspirin uninterrupted.    Total face-to-face time was 25 minutes, greater than 50% spent providing counseling and education.    Elijah Owens MD

## 2018-12-19 ENCOUNTER — HOSPITAL ENCOUNTER (INPATIENT)
Facility: CLINIC | Age: 68
LOS: 3 days | Discharge: HOME | DRG: 315 | End: 2018-12-22
Attending: RADIOLOGY | Admitting: INTERNAL MEDICINE
Payer: MEDICARE

## 2018-12-19 ENCOUNTER — APPOINTMENT (OUTPATIENT)
Dept: INTERVENTIONAL RADIOLOGY/VASCULAR | Facility: CLINIC | Age: 68
DRG: 315 | End: 2018-12-19
Attending: SURGERY
Payer: MEDICARE

## 2018-12-19 DIAGNOSIS — I73.9 PERIPHERAL ARTERIAL DISEASE (H): ICD-10-CM

## 2018-12-19 DIAGNOSIS — E78.5 HYPERLIPIDEMIA LDL GOAL <70: ICD-10-CM

## 2018-12-19 DIAGNOSIS — T82.898D OCCLUSION OF LEFT FEMOROPOPLITEAL BYPASS GRAFT, SUBSEQUENT ENCOUNTER: ICD-10-CM

## 2018-12-19 DIAGNOSIS — M54.2 CERVICALGIA: ICD-10-CM

## 2018-12-19 DIAGNOSIS — T82.898S OCCLUSION OF LEFT FEMOROPOPLITEAL BYPASS GRAFT, SEQUELA: Primary | ICD-10-CM

## 2018-12-19 DIAGNOSIS — I74.3: ICD-10-CM

## 2018-12-19 PROBLEM — T82.898A: Status: ACTIVE | Noted: 2018-12-19

## 2018-12-19 LAB
ANION GAP SERPL CALCULATED.3IONS-SCNC: 7 MMOL/L (ref 3–14)
APTT PPP: 27 SEC (ref 22–37)
BUN SERPL-MCNC: 17 MG/DL (ref 7–30)
CALCIUM SERPL-MCNC: 9.5 MG/DL (ref 8.5–10.1)
CHLORIDE SERPL-SCNC: 105 MMOL/L (ref 94–109)
CHOLEST SERPL-MCNC: 178 MG/DL
CO2 SERPL-SCNC: 27 MMOL/L (ref 20–32)
CREAT SERPL-MCNC: 0.8 MG/DL (ref 0.52–1.04)
GFR SERPL CREATININE-BSD FRML MDRD: 75 ML/MIN/1.7M2
GLUCOSE SERPL-MCNC: 91 MG/DL (ref 70–99)
HBA1C MFR BLD: 5.8 % (ref 0–5.6)
HDLC SERPL-MCNC: 60 MG/DL
INR PPP: 0.97 (ref 0.86–1.14)
LDLC SERPL CALC-MCNC: 92 MG/DL
NONHDLC SERPL-MCNC: 118 MG/DL
POTASSIUM SERPL-SCNC: 4.1 MMOL/L (ref 3.4–5.3)
SODIUM SERPL-SCNC: 139 MMOL/L (ref 133–144)
TRIGL SERPL-MCNC: 128 MG/DL

## 2018-12-19 PROCEDURE — A9270 NON-COVERED ITEM OR SERVICE: HCPCS | Mod: GY | Performed by: PHYSICIAN ASSISTANT

## 2018-12-19 PROCEDURE — 25000128 H RX IP 250 OP 636: Performed by: RADIOLOGY

## 2018-12-19 PROCEDURE — 27210743 ZZH CATH CR11

## 2018-12-19 PROCEDURE — 25000125 ZZHC RX 250: Performed by: NURSE PRACTITIONER

## 2018-12-19 PROCEDURE — 25000132 ZZH RX MED GY IP 250 OP 250 PS 637: Mod: GY | Performed by: PHYSICIAN ASSISTANT

## 2018-12-19 PROCEDURE — 40000853 ZZH STATISTIC ANGIOGRAM, STENT, VERTEBRO PLASTY

## 2018-12-19 PROCEDURE — 3E05317 INTRODUCTION OF OTHER THROMBOLYTIC INTO PERIPHERAL ARTERY, PERCUTANEOUS APPROACH: ICD-10-PCS | Performed by: RADIOLOGY

## 2018-12-19 PROCEDURE — 25000128 H RX IP 250 OP 636: Performed by: NURSE PRACTITIONER

## 2018-12-19 PROCEDURE — 36415 COLL VENOUS BLD VENIPUNCTURE: CPT | Performed by: RADIOLOGY

## 2018-12-19 PROCEDURE — 83036 HEMOGLOBIN GLYCOSYLATED A1C: CPT | Performed by: RADIOLOGY

## 2018-12-19 PROCEDURE — 80061 LIPID PANEL: CPT | Performed by: RADIOLOGY

## 2018-12-19 PROCEDURE — 25000128 H RX IP 250 OP 636

## 2018-12-19 PROCEDURE — 27210894 ZZH CATH CR6

## 2018-12-19 PROCEDURE — 36246 INS CATH ABD/L-EXT ART 2ND: CPT

## 2018-12-19 PROCEDURE — 27210808 ZZH SHEATH CR7

## 2018-12-19 PROCEDURE — 99223 1ST HOSP IP/OBS HIGH 75: CPT | Performed by: INTERNAL MEDICINE

## 2018-12-19 PROCEDURE — 85610 PROTHROMBIN TIME: CPT | Performed by: RADIOLOGY

## 2018-12-19 PROCEDURE — 36247 INS CATH ABD/L-EXT ART 3RD: CPT

## 2018-12-19 PROCEDURE — A9270 NON-COVERED ITEM OR SERVICE: HCPCS | Mod: GY | Performed by: INTERNAL MEDICINE

## 2018-12-19 PROCEDURE — 25500064 ZZH RX 255 OP 636: Performed by: RADIOLOGY

## 2018-12-19 PROCEDURE — C1769 GUIDE WIRE: HCPCS

## 2018-12-19 PROCEDURE — 12000007 ZZH R&B INTERMEDIATE

## 2018-12-19 PROCEDURE — B41G1ZZ FLUOROSCOPY OF LEFT LOWER EXTREMITY ARTERIES USING LOW OSMOLAR CONTRAST: ICD-10-PCS | Performed by: RADIOLOGY

## 2018-12-19 PROCEDURE — 75710 ARTERY X-RAYS ARM/LEG: CPT | Mod: LT,XU

## 2018-12-19 PROCEDURE — 85730 THROMBOPLASTIN TIME PARTIAL: CPT | Performed by: RADIOLOGY

## 2018-12-19 PROCEDURE — 25000132 ZZH RX MED GY IP 250 OP 250 PS 637: Mod: GY | Performed by: INTERNAL MEDICINE

## 2018-12-19 RX ORDER — HEPARIN SODIUM 10000 [USP'U]/100ML
500 INJECTION, SOLUTION INTRAVENOUS CONTINUOUS
Status: DISCONTINUED | OUTPATIENT
Start: 2018-12-19 | End: 2018-12-21

## 2018-12-19 RX ORDER — BUPROPION HYDROCHLORIDE 150 MG/1
300 TABLET ORAL EVERY MORNING
Status: DISCONTINUED | OUTPATIENT
Start: 2018-12-20 | End: 2018-12-22 | Stop reason: HOSPADM

## 2018-12-19 RX ORDER — NALOXONE HYDROCHLORIDE 0.4 MG/ML
.1-.4 INJECTION, SOLUTION INTRAMUSCULAR; INTRAVENOUS; SUBCUTANEOUS
Status: DISCONTINUED | OUTPATIENT
Start: 2018-12-19 | End: 2018-12-22 | Stop reason: HOSPADM

## 2018-12-19 RX ORDER — PROCHLORPERAZINE MALEATE 5 MG
5 TABLET ORAL EVERY 6 HOURS PRN
Status: DISCONTINUED | OUTPATIENT
Start: 2018-12-19 | End: 2018-12-22 | Stop reason: HOSPADM

## 2018-12-19 RX ORDER — ACETAMINOPHEN 325 MG/1
650 TABLET ORAL EVERY 4 HOURS PRN
Status: DISCONTINUED | OUTPATIENT
Start: 2018-12-19 | End: 2018-12-22 | Stop reason: HOSPADM

## 2018-12-19 RX ORDER — SODIUM CHLORIDE 9 MG/ML
INJECTION, SOLUTION INTRAVENOUS CONTINUOUS
Status: DISCONTINUED | OUTPATIENT
Start: 2018-12-19 | End: 2018-12-21

## 2018-12-19 RX ORDER — DOCUSATE SODIUM 100 MG/1
100 CAPSULE, LIQUID FILLED ORAL 2 TIMES DAILY
Status: DISCONTINUED | OUTPATIENT
Start: 2018-12-19 | End: 2018-12-22 | Stop reason: HOSPADM

## 2018-12-19 RX ORDER — PROCHLORPERAZINE 25 MG
12.5 SUPPOSITORY, RECTAL RECTAL EVERY 12 HOURS PRN
Status: DISCONTINUED | OUTPATIENT
Start: 2018-12-19 | End: 2018-12-22 | Stop reason: HOSPADM

## 2018-12-19 RX ORDER — FLUTICASONE PROPIONATE 50 MCG
1-2 SPRAY, SUSPENSION (ML) NASAL DAILY PRN
Status: DISCONTINUED | OUTPATIENT
Start: 2018-12-19 | End: 2018-12-22 | Stop reason: HOSPADM

## 2018-12-19 RX ORDER — LIDOCAINE HYDROCHLORIDE 10 MG/ML
1-30 INJECTION, SOLUTION EPIDURAL; INFILTRATION; INTRACAUDAL; PERINEURAL
Status: COMPLETED | OUTPATIENT
Start: 2018-12-19 | End: 2018-12-19

## 2018-12-19 RX ORDER — FENTANYL CITRATE 50 UG/ML
25-50 INJECTION, SOLUTION INTRAMUSCULAR; INTRAVENOUS EVERY 5 MIN PRN
Status: DISCONTINUED | OUTPATIENT
Start: 2018-12-19 | End: 2018-12-19

## 2018-12-19 RX ORDER — LIDOCAINE 40 MG/G
CREAM TOPICAL
Status: DISCONTINUED | OUTPATIENT
Start: 2018-12-19 | End: 2018-12-19

## 2018-12-19 RX ORDER — DEXTROSE MONOHYDRATE 25 G/50ML
25-50 INJECTION, SOLUTION INTRAVENOUS
Status: DISCONTINUED | OUTPATIENT
Start: 2018-12-19 | End: 2018-12-21

## 2018-12-19 RX ORDER — NICOTINE POLACRILEX 4 MG
15-30 LOZENGE BUCCAL
Status: DISCONTINUED | OUTPATIENT
Start: 2018-12-19 | End: 2018-12-19

## 2018-12-19 RX ORDER — NALOXONE HYDROCHLORIDE 0.4 MG/ML
.1-.4 INJECTION, SOLUTION INTRAMUSCULAR; INTRAVENOUS; SUBCUTANEOUS
Status: DISCONTINUED | OUTPATIENT
Start: 2018-12-19 | End: 2018-12-19

## 2018-12-19 RX ORDER — NICOTINE POLACRILEX 4 MG
15-30 LOZENGE BUCCAL
Status: DISCONTINUED | OUTPATIENT
Start: 2018-12-19 | End: 2018-12-21

## 2018-12-19 RX ORDER — ROSUVASTATIN CALCIUM 20 MG/1
40 TABLET, COATED ORAL AT BEDTIME
Status: DISCONTINUED | OUTPATIENT
Start: 2018-12-19 | End: 2018-12-22 | Stop reason: HOSPADM

## 2018-12-19 RX ORDER — ASPIRIN 81 MG/1
81 TABLET ORAL DAILY
Status: ON HOLD | COMMUNITY
End: 2019-01-25

## 2018-12-19 RX ORDER — DEXTROSE MONOHYDRATE 25 G/50ML
25-50 INJECTION, SOLUTION INTRAVENOUS
Status: DISCONTINUED | OUTPATIENT
Start: 2018-12-19 | End: 2018-12-19

## 2018-12-19 RX ORDER — ACETAMINOPHEN 650 MG/1
650 SUPPOSITORY RECTAL EVERY 4 HOURS PRN
Status: DISCONTINUED | OUTPATIENT
Start: 2018-12-19 | End: 2018-12-22 | Stop reason: HOSPADM

## 2018-12-19 RX ORDER — ONDANSETRON 2 MG/ML
4 INJECTION INTRAMUSCULAR; INTRAVENOUS EVERY 6 HOURS PRN
Status: DISCONTINUED | OUTPATIENT
Start: 2018-12-19 | End: 2018-12-22 | Stop reason: HOSPADM

## 2018-12-19 RX ORDER — SODIUM CHLORIDE 9 MG/ML
INJECTION, SOLUTION INTRAVENOUS CONTINUOUS
Status: DISCONTINUED | OUTPATIENT
Start: 2018-12-19 | End: 2018-12-19

## 2018-12-19 RX ORDER — EZETIMIBE 10 MG/1
10 TABLET ORAL DAILY
Status: DISCONTINUED | OUTPATIENT
Start: 2018-12-20 | End: 2018-12-22 | Stop reason: HOSPADM

## 2018-12-19 RX ORDER — METOCLOPRAMIDE 5 MG/1
5 TABLET ORAL EVERY 6 HOURS PRN
Status: DISCONTINUED | OUTPATIENT
Start: 2018-12-19 | End: 2018-12-22 | Stop reason: HOSPADM

## 2018-12-19 RX ORDER — METOCLOPRAMIDE HYDROCHLORIDE 5 MG/ML
5 INJECTION INTRAMUSCULAR; INTRAVENOUS EVERY 6 HOURS PRN
Status: DISCONTINUED | OUTPATIENT
Start: 2018-12-19 | End: 2018-12-22 | Stop reason: HOSPADM

## 2018-12-19 RX ORDER — ONDANSETRON 4 MG/1
4 TABLET, ORALLY DISINTEGRATING ORAL EVERY 6 HOURS PRN
Status: DISCONTINUED | OUTPATIENT
Start: 2018-12-19 | End: 2018-12-22 | Stop reason: HOSPADM

## 2018-12-19 RX ORDER — FENTANYL CITRATE 50 UG/ML
INJECTION, SOLUTION INTRAMUSCULAR; INTRAVENOUS
Status: COMPLETED
Start: 2018-12-19 | End: 2018-12-19

## 2018-12-19 RX ORDER — FLUMAZENIL 0.1 MG/ML
0.2 INJECTION, SOLUTION INTRAVENOUS
Status: DISCONTINUED | OUTPATIENT
Start: 2018-12-19 | End: 2018-12-19

## 2018-12-19 RX ORDER — IODIXANOL 320 MG/ML
150 INJECTION, SOLUTION INTRAVASCULAR ONCE
Status: COMPLETED | OUTPATIENT
Start: 2018-12-19 | End: 2018-12-19

## 2018-12-19 RX ADMIN — IODIXANOL 50 ML: 320 INJECTION, SOLUTION INTRAVASCULAR at 14:32

## 2018-12-19 RX ADMIN — ROSUVASTATIN CALCIUM 40 MG: 20 TABLET, FILM COATED ORAL at 23:42

## 2018-12-19 RX ADMIN — HEPARIN SODIUM 10000 UNITS: 10000 INJECTION, SOLUTION INTRAVENOUS; SUBCUTANEOUS at 13:35

## 2018-12-19 RX ADMIN — LIDOCAINE HYDROCHLORIDE 4 ML: 10 INJECTION, SOLUTION INFILTRATION; PERINEURAL at 13:47

## 2018-12-19 RX ADMIN — HEPARIN SODIUM 500 UNITS/HR: 10000 INJECTION, SOLUTION INTRAVENOUS at 14:53

## 2018-12-19 RX ADMIN — MIDAZOLAM HYDROCHLORIDE 0.5 MG: 1 INJECTION, SOLUTION INTRAMUSCULAR; INTRAVENOUS at 14:05

## 2018-12-19 RX ADMIN — MIDAZOLAM HYDROCHLORIDE 1 MG: 1 INJECTION, SOLUTION INTRAMUSCULAR; INTRAVENOUS at 13:35

## 2018-12-19 RX ADMIN — FENTANYL CITRATE 25 MCG: 50 INJECTION, SOLUTION INTRAMUSCULAR; INTRAVENOUS at 14:05

## 2018-12-19 RX ADMIN — FENTANYL CITRATE 50 MCG: 50 INJECTION, SOLUTION INTRAMUSCULAR; INTRAVENOUS at 13:35

## 2018-12-19 RX ADMIN — MIDAZOLAM HYDROCHLORIDE 0.5 MG: 1 INJECTION, SOLUTION INTRAMUSCULAR; INTRAVENOUS at 13:47

## 2018-12-19 RX ADMIN — ONDANSETRON 4 MG: 2 INJECTION INTRAMUSCULAR; INTRAVENOUS at 23:33

## 2018-12-19 RX ADMIN — ALTEPLASE 0.5 MG/HR: KIT at 14:53

## 2018-12-19 RX ADMIN — Medication: at 16:36

## 2018-12-19 RX ADMIN — SODIUM CHLORIDE: 9 INJECTION, SOLUTION INTRAVENOUS at 12:58

## 2018-12-19 RX ADMIN — ALTEPLASE 10 MG: 2.2 INJECTION, POWDER, LYOPHILIZED, FOR SOLUTION INTRAVENOUS at 14:20

## 2018-12-19 RX ADMIN — HEPARIN SODIUM 10000 UNITS: 10000 INJECTION, SOLUTION INTRAVENOUS; SUBCUTANEOUS at 13:31

## 2018-12-19 RX ADMIN — FENTANYL CITRATE 25 MCG: 50 INJECTION, SOLUTION INTRAMUSCULAR; INTRAVENOUS at 13:47

## 2018-12-19 RX ADMIN — ACETAMINOPHEN 650 MG: 325 TABLET, FILM COATED ORAL at 23:43

## 2018-12-19 ASSESSMENT — MIFFLIN-ST. JEOR: SCORE: 1249.38

## 2018-12-19 NOTE — CONSULTS
HOSPITALIST CHART CHECK:   Hospitalist service was consulted for cross coverage only.?We will peripherally follow and chart check throughout the week. ?   - For vascular medical concerns during business hours M-F, call the [facility] at [phone] to have the rounding/on call Vascular Medicine (NOT VASCULAR SURGERY) MD paged.   - After business hours M-F, for medical concerns on this patient, please page hospitalist staff.   - For vascular surgical questions, please page the appropriate surgeon (primary vascular surgeon or on call vascular surgeon) based upon the time of day.?     ELVA Alexander, CNP  Hospitalist [facility]    No charge.

## 2018-12-19 NOTE — CONSULTS
[facility]    Vascular Medicine Consultation     Date of Admission:  12/19/2018  Date of Consult (When I saw the patient): 12/19/18         Physician Supervisory Attestation:   I have reviewed and discussed with the physician assistant their history, physical and plan and independently interviewed and examined Angeli Jacobson and agree with the plan as stated in the physician assistant note.    This is a 68 year old female with known HX of PAD with Previous surgical revasc as delineated below , asked us to evaluate vascular risk factors and assist with medical management in this non-smoker, non-diabetic with hyperlipidemia presenting with re-occlusion of her left SFA PTFE interposition graft after stopping Xarelto on her own accord. Symptoms started approximately 2 weeks ago, soon after stopping Xarelto.   She is taking crestor and zetia , LDL 90.    She was initiated CD arterial lysis today  Access site looks good, left foot cold but perfusing well.  Follow lysis protocol, monitor access site, CMS , leg measurements etc  Start PCA  IVF  After completion of lysis consider IV heparin briefly then restart Xarelto ( this is not xarelto failure)  Increase Zetia to 10 mg daily    Thank you for the consult !  Vascular medicine service will follow with you    Copy to Dr. Delgado, , primary MD    Tiesha Prince MD, [facility]  Vascular Medicine   12/19/2018          Assessment & Plan   1. Left SFA PTFE interposition graft occlusion with initiation of catheter-directed lysis 4/30/18 followed by mechanical thrombectomy with possis, angioplasty, and placement of Viabahn stent in the proximal bypass graft as well as well as primary repair in the OR of right femoral artery access site 5/1/18 now presenting with re-occlusion of graft       She presented today for an angiogram. Catheter-directed lysis has been initiated again. She will be admitted and made NPO after midnight for lytic  recheck tomorrow. She admits to stopping her Xarelto on her own in early December and noted claudication a few days after that. Therefore, her occlusion is not due to a failure of Xarelto.  Further recommendations regarding anticoagulation moving forward will be provided upon completion angiography.       2. Hyperlipidemia      Her lipid panel this admission was significant for an LDL of 92, HDL 60, triglycerides 128, and total cholesterol 178 while on Crestor 40 mg daily and Zetia 5 mg daily. She relays a family history of CAD with her father and grandfather passing from an MI and her cousin undergoing triple bypass surgery at age 36. Most of her family has had trouble with hyperlipidemia, including 2 of her 3 sons. She should have tighter control of her lipids with a goal LDL being less than 70. Therefore we will have her increase her Zetia to 10 mg daily. She should have a lipid panel rechecked in 3 months. If her lipids are still not at goal (LDL<70), then [facility] for further recommendations.      3. Depression      This has been stable. Continue outpatient bupropion.        Reason for Consult   Reason for consult: Asked by Dr. Owens to evaluate vascular risk factors and assist with medical management in this 68 year old female non-smoker, non-diabetic with hyperlipidemia presenting with re-occlusion of her left SFA PTFE interposition graft after stopping Xarelto on her own accord.     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 The mass was excised along with the vein and artery in the left thigh and her SFA was reconstructed utilizing a GoreTex graft. She had been doing well up until earlier this year when she was found to have an occluded  graft. She was admitted and catheter-directed lysis was successfully undertaken in addition to placement of a viabahn stent graft in a portion of the mid-graft to reopen the graft. However, she developed bleeding from her access site in the right groin, requiring exploration of the right common femoral artery and right common femoral artery primary repair on 5/1/18. She was initiated on Xarelto upon discharge. She was also noted to have elevated lipids with an LDL of 173 despite being on Atorvastatin 80 mg daily. Her Atorvastatin was changed to Crestor 40 mg daily and Zetia 5 mg daily was added. She was seen by Dr. Owens for a 3 month follow-up at which time her ABIs were normal and she had palpable pulses.  However, she presented to the Vascular Clinic on 12/13/18 with a 1 week history of left lower extremity claudication. Her ABIs were 0.62 at rest on the left, dropping to 0.28 with exercise. Her left SFA appears to be occluded again. She now presents for an angiogram with possible initiation of lysis. She does admit to stopping her Xarelto on her own in early December as she thought she had been on it over 7 months and could stop it. It was a few days after stopping it that she noted her symptoms.         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    CHRONIC LEFT THIGH LYMPHEDEMA     Nontoxic multinodular goiter 2005    needs yearly US     Osteoporosis, unspecified 2001     Other lymphedema 2000    left thigh, gets regular PT for this      PAD (peripheral artery disease) (H) 2018     SHINGLES      Sprain of lumbosacral (joint) (ligament)     right     Unspecified hearing loss     chronic tinnitus     Unspecified tinnitus        Past Surgical History   Past Surgical History:   Procedure Laterality Date     C APPENDECTOMY      Appendectomy     C NONSPECIFIC PROCEDURE  2000    Open Biopsy Left Thigh Liposarcoma     COLONOSCOPY N/A 2016    Procedure: COMBINED COLONOSCOPY, SINGLE OR MULTIPLE BIOPSY/POLYPECTOMY BY BIOPSY;  Surgeon: Varun Stanley MD, MD;  Location: [facility] GI     EXCISION MALIG LESION>1.25CM  2000    Myxoid Liposarcoma       EXPLORE GROIN Right 2018    Procedure: EXPLORE GROIN;  EMERGENCY RIGHT FEMORAL EXPLORATION WITH FEMORAL ARTERY REPAIR.    EBL: 50mL;  Surgeon: Shade Owens MD;  Location: [facility] OR     HC COLONOSCOPY THRU STOMA, DIAGNOSTIC      due      HC COLP CERVIX/UPPER VAGINA  1997    Negative     HC DILATION/CURETTAGE DIAG/THER NON OB  1997    Post menopausal bleeding on HRT, negative     VASCULAR SURGERY  2018    Left SFA stent in bypass graft       Prior to Admission Medications   Prior to Admission Medications   Prescriptions Last Dose Informant Patient Reported? Taking?   MULTI-VITAMIN OR TABS 2018 at Unknown time  Yes Yes   Si TABLET DAILY   ORDER FOR DME   No No   Sig: Equipment being ordered: lymphedema bandages   alendronate (FOSAMAX) 70 MG tablet Past Week at Unknown time  No Yes   Sig: Take 1 tablet (70 mg) by mouth with 8oz water every 7 days 30 minutes before breakfast and remain upright during this time.   aspirin 81 MG EC tablet 2018 at Unknown time  Yes Yes   Sig: Take 81 mg by mouth daily   buPROPion (WELLBUTRIN XL) 300 MG 24 hr tablet 2018 at Unknown time  No Yes   Sig: Take 1 tablet (300 mg) by mouth every morning   ezetimibe (ZETIA) 10 MG tablet 2018 at Unknown time  No Yes   Sig: Take 0.5 tablets (5 mg) by mouth daily    fluticasone (FLONASE) 50 MCG/ACT spray Past Month at Unknown time  No Yes   Sig: Spray 1-2 sprays into both nostrils daily   order for DME   No No   Sig: Equipment being ordered: Left Thigh High Compression Stocking, 550 CCL.3   rivaroxaban ANTICOAGULANT (XARELTO) 20 MG TABS tablet 2018  No No   Sig: Take 1 tablet (20 mg) by mouth daily (with dinner) Start after completing 15 mg tablets   rosuvastatin (CRESTOR) 40 MG tablet 2018 at Unknown time  No Yes   Sig: Take 1 tablet (40 mg) by mouth At Bedtime      Facility-Administered Medications: None     Allergies   Allergies   Allergen Reactions     Celexa [Citalopram Hydrobromide]      Decreased libido       Social History   Angeli Jacobson  reports that  has never smoked. she has never used smokeless tobacco. She reports that she does not drink alcohol or use drugs.    Family History   Family History   Problem Relation Age of Onset     C.A.D. Father         MI 57     Alcohol/Drug Father         etoh     Obesity Mother      Osteoporosis Mother      Colon Cancer Brother 70     Hyperlipidemia Son      Hyperlipidemia Son         very high, experimental drug     C.A.D. Paternal Grandmother         ascvd     Diabetes Maternal Grandmother      Cancer Maternal Grandmother      C.A.D. Paternal Uncle         Mi  age 48     Cancer Maternal Aunt         pancreatic CA       Review of Systems   The 10 point Review of Systems is negative other than noted in the HPI or here.     Physical Exam   Temp: 97.7  F (36.5  C) Temp src: Oral BP: 134/80 Pulse: 73 Heart Rate: 73 Resp: 12 SpO2: 100 % O2 Device: Nasal cannula Oxygen Delivery: 4 LPM  Vital Signs with Ranges  Temp:  [97.7  F (36.5  C)] 97.7  F (36.5  C)  Pulse:  [72-74] 73  Heart Rate:  [71-74] 73  Resp:  [12-28] 12  BP: (113-142)/(75-83) 134/80  SpO2:  [94 %-100 %] 100 %  151 lbs 6.4 oz    Constitutional: awake, alert, cooperative, no apparent distress, and appears stated age  Eyes: Lids and lashes  normal, pupils equal, round and reactive to light, extra ocular muscles intact, sclera clear, conjunctiva normal  ENT: normocepalic, without obvious abnormality, oropharynx pink and moist  Hematologic / Lymphatic: no lymphadenopathy  Respiratory: No increased work of breathing, good air exchange, clear to auscultation bilaterally, no crackles or wheezing  Cardiovascular: regular rate and rhythm, normal S1 and S2 and no murmur noted  GI: Normal bowel sounds, soft, non-distended, non-tender  Skin: no redness, warmth, or swelling, no rashes and no lesions  Musculoskeletal: There is no redness, warmth, or swelling of the joints.  Full range of motion noted.  Motor strength is 5 out of 5 all extremities bilaterally.  Tone is normal.  Neurologic: Awake, alert, oriented to name, place and time.  Cranial nerves II-XII are grossly intact.  Motor is 5 out of 5 bilaterally.    Neuropsychiatric:  Normal affect, memory, insight.  Pulses: Unable to palpate left pedal pulses. Palpable right pedal pulses. No carotid bruits appreciated.     Data   Most Recent 3 CBC's:  Recent Labs   Lab Test 12/18/18  1041 08/28/18  1041 05/22/18  1023   WBC 6.8 5.7 5.2   HGB 13.7 14.2 11.3*   MCV 95 92 101*    207 318     Most Recent 3 BMP's:  Recent Labs   Lab Test 12/18/18  1041 08/28/18  1041 05/05/18  1925    142 143   POTASSIUM 4.1 4.5 3.4   CHLORIDE 105 107 107   CO2 27 30 29   BUN 17 15 13   CR 0.80 0.80 0.65   ANIONGAP 7 5 7   LONG 9.5 9.2 9.0   GLC 91 92 97     Most Recent 3 INR's:  Recent Labs   Lab Test 12/19/18  1241 05/01/18  1700 04/30/18  0740   INR 0.97 1.43* 0.96     Most Recent Cholesterol Panel:  Recent Labs   Lab Test 12/19/18  1241   CHOL 178   LDL 92   HDL 60   TRIG 128     Most Recent Hemoglobin A1c:  Recent Labs   Lab Test 12/19/18  1241   A1C 5.8*

## 2018-12-19 NOTE — PROGRESS NOTES
Interventional Radiology Pre-Procedure Sedation Assessment   Time of Assessment: 1:29 PM    Expected Level: Moderate Sedation    Indication: Sedation is required for the following type of Procedure: Left leg angiogram with possible alteplase therapy, thrombectomy, angioplasty and/or stent placement with IV moderate sedation    Sedation and procedural consent: Risks, benefits and alternatives were discussed with Patient and Spouse    PO Intake: Appropriately NPO for procedure    ASA Class: Class 2 - MILD SYSTEMIC DISEASE, NO ACUTE PROBLEMS, NO FUNCTIONAL LIMITATIONS.    Mallampati: Grade 2:  Soft palate, base of uvula, tonsillar pillars, and portion of posterior pharyngeal wall visible    Lungs: Lungs Clear with good breath sounds bilaterally    Heart: Normal heart sounds and rate    History and physical reviewed and no updates needed. I have reviewed the lab findings, diagnostic data, medications, and the plan for sedation. I have determined this patient to be an appropriate candidate for the planned sedation and procedure and have reassessed the patient IMMEDIATELY PRIOR to sedation and procedure.    Leanne Cortes, ELVA CNP

## 2018-12-19 NOTE — PROGRESS NOTES
Interventional Radiology Intra-procedural Nursing Note     Patient Name:  Angeli Jacobson    Medical Record Number: [id number]  Today's Date:  December 19, 2018  Procedure: Left Lower Extremity Angiogram  Start Time: 1343  End of procedure time: 1424    Report given to: Idania RN, station 33  Time pt departs:  1500       Notes:  16F thompson placed without any issues in sterile fashion, positive urine return.    Patient brought into IR room # 2 at 1220.      Informed consent obtained by Jenni Delgado MD.         Allergies   Allergen Reactions     Celexa [Citalopram Hydrobromide]      Decreased libido       Labs reviewed:  Results for orders placed or performed during the hospital encounter of 12/19/18 (from the past 24 hour(s))   Hemoglobin A1c   Result Value Ref Range    Hemoglobin A1C 5.8 (H) 0 - 5.6 %   Lipid panel   Result Value Ref Range    Cholesterol 178 <200 mg/dL    Triglycerides 128 <150 mg/dL    HDL Cholesterol 60 >49 mg/dL    LDL Cholesterol Calculated 92 <100 mg/dL    Non HDL Cholesterol 118 <130 mg/dL   INR   Result Value Ref Range    INR 0.97 0.86 - 1.14   Partial thromboplastin time   Result Value Ref Range    PTT 27 22 - 37 sec       Neuro assessment completed and found to be WNL.     Peripheral pulses checked and documented in Epic Flowsheet.     Patient prepped with 2% Chlorhexidine and draped in supine position.  VSS.  Monitor reads NSR with rate in the 70's.      MD first needle stick time was 1346. A total of 4 ml of 1% lidocaine was used locally at the puncture site.    A 5Fr RFA sheath was placed at 1348, changed out for 6Fr RFA sheath at 1407    Debrief was completed by Jenni Delgado MD.       Patient received a total of 2 mg Versed and 100 mcg fentanyl for sedation during the procedure.     The patient tolerated the procedure well.     Neuro assessment remained unchanged at procedure end.     A 6Fr RFA sheath was left in and placed to 500 units/hr of heparin. Infusion catheter at  mg/hr. Calf circumference: 41cm. The site is C/D/I at procedure end without hematoma.       Elvia Silva RN

## 2018-12-19 NOTE — PROGRESS NOTES
Pt here for left leg angiogram. Denies pain. Procedure explained and questions answered.  at bedside.

## 2018-12-19 NOTE — PROCEDURES
RADIOLOGY POST PROCEDURE NOTE w/ SEDATION  Patient name: Angeli Jacobson  MRN: [id number]  : 1950    Pre-procedure diagnosis: occluded left femoral bypass.   Post-procedure diagnosis: Same    Procedure Date/Time: 2018  2:39 PM  Procedure: left lower extremity angiogram with initiation of thrombolysis. (left SFA)  Estimated blood loss: None  Specimen(s) collected with description: none    I determined this patient to be an appropriate candidate for the planned sedation and procedure and reassessed the patient IMMEDIATELY PRIOR to sedation and procedure.     The patient tolerated the procedure well with no immediate complications.  Significant findings:none    See imaging dictation for procedural details.    Provider name: Jenni Delgado  Assistant(s):None

## 2018-12-20 ENCOUNTER — APPOINTMENT (OUTPATIENT)
Dept: INTERVENTIONAL RADIOLOGY/VASCULAR | Facility: CLINIC | Age: 68
DRG: 315 | End: 2018-12-20
Attending: RADIOLOGY
Payer: MEDICARE

## 2018-12-20 LAB
ANION GAP SERPL CALCULATED.3IONS-SCNC: 8 MMOL/L (ref 3–14)
BASOPHILS # BLD AUTO: 0 10E9/L (ref 0–0.2)
BASOPHILS NFR BLD AUTO: 0.3 %
BUN SERPL-MCNC: 6 MG/DL (ref 7–30)
CALCIUM SERPL-MCNC: 8.1 MG/DL (ref 8.5–10.1)
CHLORIDE SERPL-SCNC: 110 MMOL/L (ref 94–109)
CO2 SERPL-SCNC: 25 MMOL/L (ref 20–32)
CREAT SERPL-MCNC: 0.53 MG/DL (ref 0.52–1.04)
CREAT SERPL-MCNC: 0.62 MG/DL (ref 0.52–1.04)
DIFFERENTIAL METHOD BLD: NORMAL
EOSINOPHIL # BLD AUTO: 0.1 10E9/L (ref 0–0.7)
EOSINOPHIL NFR BLD AUTO: 0.6 %
ERYTHROCYTE [DISTWIDTH] IN BLOOD BY AUTOMATED COUNT: 12.8 % (ref 10–15)
GFR SERPL CREATININE-BSD FRML MDRD: >90 ML/MIN/{1.73_M2}
GFR SERPL CREATININE-BSD FRML MDRD: >90 ML/MIN/{1.73_M2}
GLUCOSE SERPL-MCNC: 158 MG/DL (ref 70–99)
HCT VFR BLD AUTO: 37.7 % (ref 35–47)
HGB BLD-MCNC: 12.5 G/DL (ref 11.7–15.7)
IMM GRANULOCYTES # BLD: 0 10E9/L (ref 0–0.4)
IMM GRANULOCYTES NFR BLD: 0.1 %
LMWH PPP CHRO-ACNC: 0.15 IU/ML
LYMPHOCYTES # BLD AUTO: 1.5 10E9/L (ref 0.8–5.3)
LYMPHOCYTES NFR BLD AUTO: 19.1 %
MCH RBC QN AUTO: 30.5 PG (ref 26.5–33)
MCHC RBC AUTO-ENTMCNC: 33.2 G/DL (ref 31.5–36.5)
MCV RBC AUTO: 92 FL (ref 78–100)
MONOCYTES # BLD AUTO: 0.4 10E9/L (ref 0–1.3)
MONOCYTES NFR BLD AUTO: 4.9 %
NEUTROPHILS # BLD AUTO: 5.9 10E9/L (ref 1.6–8.3)
NEUTROPHILS NFR BLD AUTO: 75 %
NRBC # BLD AUTO: 0 10*3/UL
NRBC BLD AUTO-RTO: 0 /100
PLATELET # BLD AUTO: 163 10E9/L (ref 150–450)
POTASSIUM SERPL-SCNC: 3.5 MMOL/L (ref 3.4–5.3)
RBC # BLD AUTO: 4.1 10E12/L (ref 3.8–5.2)
SODIUM SERPL-SCNC: 143 MMOL/L (ref 133–144)
WBC # BLD AUTO: 7.9 10E9/L (ref 4–11)

## 2018-12-20 PROCEDURE — C1769 GUIDE WIRE: HCPCS

## 2018-12-20 PROCEDURE — 82565 ASSAY OF CREATININE: CPT | Performed by: RADIOLOGY

## 2018-12-20 PROCEDURE — 40000884 ZZH STATISTIC STEP DOWN HRS NIGHT

## 2018-12-20 PROCEDURE — 37213 THROMBLYTIC ART/VEN THERAPY: CPT

## 2018-12-20 PROCEDURE — 12000007 ZZH R&B INTERMEDIATE

## 2018-12-20 PROCEDURE — 25000128 H RX IP 250 OP 636: Performed by: RADIOLOGY

## 2018-12-20 PROCEDURE — 85025 COMPLETE CBC W/AUTO DIFF WBC: CPT | Performed by: RADIOLOGY

## 2018-12-20 PROCEDURE — 27210906 ZZH KIT CR8

## 2018-12-20 PROCEDURE — 25000132 ZZH RX MED GY IP 250 OP 250 PS 637: Mod: GY | Performed by: INTERNAL MEDICINE

## 2018-12-20 PROCEDURE — 80048 BASIC METABOLIC PNL TOTAL CA: CPT | Performed by: NURSE PRACTITIONER

## 2018-12-20 PROCEDURE — 27210886 ZZH ACCESSORY CR5

## 2018-12-20 PROCEDURE — 25000132 ZZH RX MED GY IP 250 OP 250 PS 637: Mod: GY | Performed by: RADIOLOGY

## 2018-12-20 PROCEDURE — A9270 NON-COVERED ITEM OR SERVICE: HCPCS | Mod: GY | Performed by: PHYSICIAN ASSISTANT

## 2018-12-20 PROCEDURE — 25000132 ZZH RX MED GY IP 250 OP 250 PS 637: Mod: GY | Performed by: PHYSICIAN ASSISTANT

## 2018-12-20 PROCEDURE — B41G1ZZ FLUOROSCOPY OF LEFT LOWER EXTREMITY ARTERIES USING LOW OSMOLAR CONTRAST: ICD-10-PCS | Performed by: RADIOLOGY

## 2018-12-20 PROCEDURE — A9270 NON-COVERED ITEM OR SERVICE: HCPCS | Mod: GY | Performed by: INTERNAL MEDICINE

## 2018-12-20 PROCEDURE — A9270 NON-COVERED ITEM OR SERVICE: HCPCS | Mod: GY | Performed by: RADIOLOGY

## 2018-12-20 PROCEDURE — 25500064 ZZH RX 255 OP 636: Performed by: RADIOLOGY

## 2018-12-20 PROCEDURE — 27210896 ZZH CATH CR9

## 2018-12-20 PROCEDURE — 36415 COLL VENOUS BLD VENIPUNCTURE: CPT | Performed by: NURSE PRACTITIONER

## 2018-12-20 PROCEDURE — 99233 SBSQ HOSP IP/OBS HIGH 50: CPT | Performed by: INTERNAL MEDICINE

## 2018-12-20 PROCEDURE — 85520 HEPARIN ASSAY: CPT | Performed by: RADIOLOGY

## 2018-12-20 PROCEDURE — 36415 COLL VENOUS BLD VENIPUNCTURE: CPT | Performed by: RADIOLOGY

## 2018-12-20 RX ORDER — HEPARIN SODIUM 1000 [USP'U]/ML
500-6000 INJECTION, SOLUTION INTRAVENOUS; SUBCUTANEOUS
Status: DISCONTINUED | OUTPATIENT
Start: 2018-12-20 | End: 2018-12-20 | Stop reason: HOSPADM

## 2018-12-20 RX ORDER — FENTANYL CITRATE 50 UG/ML
25-50 INJECTION, SOLUTION INTRAMUSCULAR; INTRAVENOUS EVERY 5 MIN PRN
Status: DISCONTINUED | OUTPATIENT
Start: 2018-12-20 | End: 2018-12-20 | Stop reason: HOSPADM

## 2018-12-20 RX ORDER — OXYCODONE HYDROCHLORIDE 5 MG/1
10 TABLET ORAL
Status: DISCONTINUED | OUTPATIENT
Start: 2018-12-20 | End: 2018-12-22 | Stop reason: HOSPADM

## 2018-12-20 RX ORDER — NALOXONE HYDROCHLORIDE 0.4 MG/ML
.1-.4 INJECTION, SOLUTION INTRAMUSCULAR; INTRAVENOUS; SUBCUTANEOUS
Status: DISCONTINUED | OUTPATIENT
Start: 2018-12-20 | End: 2018-12-20 | Stop reason: HOSPADM

## 2018-12-20 RX ORDER — OXYCODONE HYDROCHLORIDE 5 MG/1
5 TABLET ORAL
Status: DISCONTINUED | OUTPATIENT
Start: 2018-12-20 | End: 2018-12-22 | Stop reason: HOSPADM

## 2018-12-20 RX ORDER — CYCLOBENZAPRINE HCL 5 MG
5 TABLET ORAL 3 TIMES DAILY PRN
Status: DISCONTINUED | OUTPATIENT
Start: 2018-12-20 | End: 2018-12-22 | Stop reason: HOSPADM

## 2018-12-20 RX ORDER — LIDOCAINE HYDROCHLORIDE 10 MG/ML
1-30 INJECTION, SOLUTION EPIDURAL; INFILTRATION; INTRACAUDAL; PERINEURAL
Status: DISCONTINUED | OUTPATIENT
Start: 2018-12-20 | End: 2018-12-20 | Stop reason: HOSPADM

## 2018-12-20 RX ORDER — IODIXANOL 320 MG/ML
150 INJECTION, SOLUTION INTRAVASCULAR ONCE
Status: COMPLETED | OUTPATIENT
Start: 2018-12-20 | End: 2018-12-20

## 2018-12-20 RX ORDER — FLUMAZENIL 0.1 MG/ML
0.2 INJECTION, SOLUTION INTRAVENOUS
Status: DISCONTINUED | OUTPATIENT
Start: 2018-12-20 | End: 2018-12-20 | Stop reason: HOSPADM

## 2018-12-20 RX ADMIN — BUPROPION HYDROCHLORIDE 300 MG: 150 TABLET, FILM COATED, EXTENDED RELEASE ORAL at 16:02

## 2018-12-20 RX ADMIN — ALTEPLASE 0.5 MG/HR: KIT at 00:19

## 2018-12-20 RX ADMIN — ACETAMINOPHEN 650 MG: 325 TABLET, FILM COATED ORAL at 17:22

## 2018-12-20 RX ADMIN — ALTEPLASE 0.5 MG/HR: KIT at 11:20

## 2018-12-20 RX ADMIN — DOCUSATE SODIUM 100 MG: 100 CAPSULE, LIQUID FILLED ORAL at 16:02

## 2018-12-20 RX ADMIN — ACETAMINOPHEN 650 MG: 325 TABLET, FILM COATED ORAL at 22:03

## 2018-12-20 RX ADMIN — CYCLOBENZAPRINE HYDROCHLORIDE 5 MG: 5 TABLET, FILM COATED ORAL at 16:03

## 2018-12-20 RX ADMIN — ALTEPLASE 0.5 MG/HR: KIT at 21:46

## 2018-12-20 RX ADMIN — PROCHLORPERAZINE EDISYLATE 5 MG: 5 INJECTION INTRAMUSCULAR; INTRAVENOUS at 07:09

## 2018-12-20 RX ADMIN — HEPARIN SODIUM 10000 UNITS: 10000 INJECTION, SOLUTION INTRAVENOUS; SUBCUTANEOUS at 10:19

## 2018-12-20 RX ADMIN — ACETAMINOPHEN 650 MG: 325 TABLET, FILM COATED ORAL at 11:21

## 2018-12-20 RX ADMIN — METOCLOPRAMIDE 5 MG: 5 INJECTION, SOLUTION INTRAMUSCULAR; INTRAVENOUS at 11:20

## 2018-12-20 RX ADMIN — ROSUVASTATIN CALCIUM 40 MG: 20 TABLET, FILM COATED ORAL at 20:51

## 2018-12-20 RX ADMIN — PROCHLORPERAZINE EDISYLATE 5 MG: 5 INJECTION INTRAMUSCULAR; INTRAVENOUS at 10:16

## 2018-12-20 RX ADMIN — ONDANSETRON 4 MG: 2 INJECTION INTRAMUSCULAR; INTRAVENOUS at 06:50

## 2018-12-20 RX ADMIN — SODIUM CHLORIDE: 9 INJECTION, SOLUTION INTRAVENOUS at 19:15

## 2018-12-20 RX ADMIN — IODIXANOL 26 ML: 320 INJECTION, SOLUTION INTRAVASCULAR at 10:32

## 2018-12-20 RX ADMIN — ACETAMINOPHEN 650 MG: 325 TABLET, FILM COATED ORAL at 07:21

## 2018-12-20 RX ADMIN — EZETIMIBE 5 MG: 10 TABLET ORAL at 16:03

## 2018-12-20 ASSESSMENT — ACTIVITIES OF DAILY LIVING (ADL)
ADLS_ACUITY_SCORE: 12
ADLS_ACUITY_SCORE: 14

## 2018-12-20 NOTE — PROGRESS NOTES
Talked with Dr. Prince on floor regarding pain medication for neck stiffness. Order for Flexeril 5mg PO TID given verbally.

## 2018-12-20 NOTE — CONSULTS
Page to Dr. Owens regarding pain uncontrolled with tylenol and PCA dilaudid giving patient nausea. Order taken via TORB for 5-10 mg oxycodone q3h PO and to discontinue PCA.

## 2018-12-20 NOTE — PROCEDURES
RADIOLOGY POST PROCEDURE NOTE    Patient name: Angeli Jacobson  MRN: [id number]  : 1950    Pre-procedure diagnosis: LLE angiogram and arterial thrombolysis  Post-procedure diagnosis: Same    Procedure Date/Time: 2018  12:18 PM  Procedure: LLE angiogram.  Proximal thrombus in bypass, though improved form prior exam.  Thrombus is suspected to be acute/subacute.  Therefore, placed 10 cm infusion catheter (vs. 30 cm infusion).  Continue tPA as before.  Followup in AM.  No complications.  Estimated blood loss: < 5 ml  Specimen(s) collected with description: 30 cm infusion catheter replaced.    The patient tolerated the procedure well with no immediate complications.  Significant findings:  Please see above.    See imaging dictation for procedural details.    Provider name: Alonzo Sommer  Assistant(s):None

## 2018-12-20 NOTE — PROGRESS NOTES
HOSPITALIST CONSULT CHART CHECK:    Hospitalist service was consulted for cross coverage only. We will peripherally follow and chart check throughout the week.      - For vascular medical concerns during business hours M-F, call the [facility] at [phone] to have the rounding/on call Vascular Medicine (NOT VASCULAR SURGERY) MD paged.    - After business hours M-F, for medical concerns on this patient, please page hospitalist staff.    - For vascular surgical questions, please page the appropriate surgeon (primary vascular surgeon or on call vascular surgeon) based upon the time of day.     No charge    Barbara Goff, [facility]  Hospitalist [facility]  [phone]

## 2018-12-20 NOTE — PROGRESS NOTES
IR NOTE:    Patient into IR suite 1 via bed monitored with TPA and heparin infusing to sheath and infusion cath. Both stopped. Left calf measuring 39 cm. Doppler left DP and PT and +2 pulses in right DP and PT. Pictures done. Patient to table in supine position, prepped and draped with 2% chlorhexidine. Dr. Sommer in room, timeout and procedure started. Patient nauseated, 5 mg compazine given. New infusion catheter placed to proximal graft, SFA. Heparin restarted at 500 units/hr. TPA to infusion cath at 0.5 mg/hr. Patient tolerated procedure well. No sedation given. Debrief with Dr. Sommre, no complications. Calf circumference continues to measure 39 cm. Patient back to station 33 via bed, monitored. Report given to station 33 ANNEMARIE Emerson.    Elvia Silva RN

## 2018-12-20 NOTE — PROGRESS NOTES
Patient arrived back to room 322 from IR. LLE calf circumference measures 39cm, checked with IR ANNEMARIE Gan. All lytic lines verified.

## 2018-12-20 NOTE — PROGRESS NOTES
[facility]    Vascular Medicine Progress Note    Date of Service (when I saw the patient): 12/20/2018     Assessment & Plan   Angeli Jacobson is a 68 year old female who was admitted on 12/19/2018.     1. Left SFA PTFE interposition graft occlusion with initiation of catheter-directed lysis 4/30/18 followed by mechanical thrombectomy with possis, angioplasty, and placement of Viabahn stent in the proximal bypass graft as well as well as primary repair in the OR of right femoral artery access site 5/1/18 now presenting with re-occlusion of graft     Initiation of CD lysis 12/19/18-    Lysis recheck today with some improvement and now dopplerable DP and PT  C/o neck spasm due to hospital bed and  Lying flat  Access site looks good.  Lysis recheck tomorrow  Cont IVF, follow lysis protocol   She admits to stopping her Xarelto on her own in early December and noted claudication a few days after that. Therefore, her occlusion is not due to a failure of Xarelto.  Further recommendations regarding anticoagulation moving forward will be provided upon completion angiography.     Flexeril 5 mg tid prn for neck pain      2. Hyperlipidemia      Her lipid panel this admission was significant for an LDL of 92, HDL 60, triglycerides 128, and total cholesterol 178 while on Crestor 40 mg daily and Zetia 5 mg daily. She relays a family history of CAD with her father and grandfather passing from an MI and her cousin undergoing triple bypass surgery at age 36. Most of her family has had trouble with hyperlipidemia, including 2 of her 3 sons. She should have tighter control of her lipids with a goal LDL being less than 70. Therefore we will have her increase her Zetia to 10 mg daily. She should have a lipid panel rechecked in 3 months. If her lipids are still not at goal (LDL<70), then she should be seen in the [facility] for further recommendations.      3. Depression      This has been stable. Continue  outpatient bupropion.      Patient care time spent 35 minutes today       Tiesha Prince MD,[facility]  Vascular medicine    Interval History     Tolerating lysis, some improvement today and  Continued.  C/o neck pain      Physical Exam   Temp: 97.8  F (36.6  C) Temp src: Oral BP: 128/73 Pulse: 74 Heart Rate: 73 Resp: 11 SpO2: 98 % O2 Device: None (Room air)    Vitals:    12/19/18 1218   Weight: 68.7 kg (151 lb 6.4 oz)     Vital Signs with Ranges  Temp:  [97  F (36.1  C)-97.8  F (36.6  C)] 97.8  F (36.6  C)  Pulse:  [67-80] 74  Heart Rate:  [65-80] 73  Resp:  [10-19] 11  BP: (110-145)/(62-90) 128/73  SpO2:  [94 %-99 %] 98 %  I/O last 3 completed shifts:  In: 1830 [P.O.:1080; I.V.:750]  Out: 4450 [Urine:4450]    Constitutional: Awake, alert, cooperative, no apparent distress, and appears stated age.  Eyes: Lids and lashes normal, pupils equal, round and reactive to light, extra ocular muscles intact, sclera clear, conjunctiva normal.  ENT: Normocephalic, without obvious abnormality, c/o neck pain  Respiratory: No increased work of breathing, good air exchange, clear to auscultation bilaterally, no crackles or wheezing.  Cardiovascular: Normal apical impulse, regular rate and rhythm, normal S1 and S2, no S3 or S4, and no murmur noted.  GI:  normal bowel sounds, soft, non-distended, non-tender, no masses palpated, no hepatosplenomegaly.  Lymph/Hematologic: No cervical lymphadenopathy and no supraclavicular lymphadenopathy.  Skin: No bruising or bleeding, normal skin color, texture, turgor, no redness, warmth, or swelling, no rashes, no lesions,  nails normal without discoloration or clubbing and no jaundice.  Musculoskeletal: There is no redness, warmth, or swelling of the joints.  Full range of motion noted.  Motor strength is 5 out of 5 all extremities bilaterally.  Tone is normal.  Neurologic: Awake, alert, oriented to name, place and time.  Cranial nerves II-XII are grossly intact.  Motor is 5 out of 5  bilaterally.   Neuropsychiatric: Calm, normal eye contact, alert, normal affect, oriented to self, place, time and situation, memory for past and recent events intact and thought process normal.  Pulses: dopplerable  Left DP and PT today.    Medications     alteplase (ACTIVASE) 5 mg/500 mL infusion (LINE 1) 0.5 mg/hr (12/20/18 1157)     heparin 500 Units/hr (12/20/18 1157)     sodium chloride 75 mL/hr at 12/20/18 1157       buPROPion  300 mg Oral QAM     docusate sodium  100 mg Oral BID     ezetimibe  10 mg Oral Daily     rosuvastatin  40 mg Oral At Bedtime       Data   Recent Labs   Lab 12/20/18  0640 12/19/18  1241 12/18/18  1041   WBC 7.9  --  6.8   HGB 12.5  --  13.7   MCV 92  --  95     --  253   INR  --  0.97  --    NA  --   --  139   POTASSIUM  --   --  4.1   CHLORIDE  --   --  105   CO2  --   --  27   BUN  --   --  17   CR 0.62  --  0.80   ANIONGAP  --   --  7   LONG  --   --  9.5   GLC  --   --  91

## 2018-12-21 ENCOUNTER — APPOINTMENT (OUTPATIENT)
Dept: INTERVENTIONAL RADIOLOGY/VASCULAR | Facility: CLINIC | Age: 68
DRG: 315 | End: 2018-12-21
Attending: RADIOLOGY
Payer: MEDICARE

## 2018-12-21 LAB
ANION GAP SERPL CALCULATED.3IONS-SCNC: 7 MMOL/L (ref 3–14)
BASOPHILS # BLD AUTO: 0 10E9/L (ref 0–0.2)
BASOPHILS NFR BLD AUTO: 0.5 %
BUN SERPL-MCNC: 5 MG/DL (ref 7–30)
CALCIUM SERPL-MCNC: 7.9 MG/DL (ref 8.5–10.1)
CHLORIDE SERPL-SCNC: 111 MMOL/L (ref 94–109)
CO2 SERPL-SCNC: 24 MMOL/L (ref 20–32)
CREAT SERPL-MCNC: 0.58 MG/DL (ref 0.52–1.04)
DIFFERENTIAL METHOD BLD: NORMAL
EOSINOPHIL # BLD AUTO: 0.1 10E9/L (ref 0–0.7)
EOSINOPHIL NFR BLD AUTO: 0.8 %
ERYTHROCYTE [DISTWIDTH] IN BLOOD BY AUTOMATED COUNT: 13 % (ref 10–15)
ERYTHROCYTE [DISTWIDTH] IN BLOOD BY AUTOMATED COUNT: 13.1 % (ref 10–15)
GFR SERPL CREATININE-BSD FRML MDRD: >90 ML/MIN/{1.73_M2}
GLUCOSE SERPL-MCNC: 88 MG/DL (ref 70–99)
HCT VFR BLD AUTO: 37.9 % (ref 35–47)
HCT VFR BLD AUTO: 40.2 % (ref 35–47)
HGB BLD-MCNC: 12.6 G/DL (ref 11.7–15.7)
HGB BLD-MCNC: 13.4 G/DL (ref 11.7–15.7)
IMM GRANULOCYTES # BLD: 0 10E9/L (ref 0–0.4)
IMM GRANULOCYTES NFR BLD: 0.2 %
LMWH PPP CHRO-ACNC: <0.1 IU/ML
LYMPHOCYTES # BLD AUTO: 2 10E9/L (ref 0.8–5.3)
LYMPHOCYTES NFR BLD AUTO: 32.5 %
MCH RBC QN AUTO: 30.5 PG (ref 26.5–33)
MCH RBC QN AUTO: 30.9 PG (ref 26.5–33)
MCHC RBC AUTO-ENTMCNC: 33.2 G/DL (ref 31.5–36.5)
MCHC RBC AUTO-ENTMCNC: 33.3 G/DL (ref 31.5–36.5)
MCV RBC AUTO: 92 FL (ref 78–100)
MCV RBC AUTO: 93 FL (ref 78–100)
MONOCYTES # BLD AUTO: 0.4 10E9/L (ref 0–1.3)
MONOCYTES NFR BLD AUTO: 6.3 %
NEUTROPHILS # BLD AUTO: 3.7 10E9/L (ref 1.6–8.3)
NEUTROPHILS NFR BLD AUTO: 59.7 %
NRBC # BLD AUTO: 0 10*3/UL
NRBC BLD AUTO-RTO: 0 /100
PLATELET # BLD AUTO: 146 10E9/L (ref 150–450)
PLATELET # BLD AUTO: 151 10E9/L (ref 150–450)
POTASSIUM SERPL-SCNC: 3.4 MMOL/L (ref 3.4–5.3)
RBC # BLD AUTO: 4.13 10E12/L (ref 3.8–5.2)
RBC # BLD AUTO: 4.34 10E12/L (ref 3.8–5.2)
SODIUM SERPL-SCNC: 142 MMOL/L (ref 133–144)
WBC # BLD AUTO: 6.2 10E9/L (ref 4–11)
WBC # BLD AUTO: 7.3 10E9/L (ref 4–11)

## 2018-12-21 PROCEDURE — A9270 NON-COVERED ITEM OR SERVICE: HCPCS | Mod: GY | Performed by: PHYSICIAN ASSISTANT

## 2018-12-21 PROCEDURE — 27210906 ZZH KIT CR8

## 2018-12-21 PROCEDURE — A9270 NON-COVERED ITEM OR SERVICE: HCPCS | Mod: GY | Performed by: INTERNAL MEDICINE

## 2018-12-21 PROCEDURE — 25000132 ZZH RX MED GY IP 250 OP 250 PS 637: Mod: GY | Performed by: INTERNAL MEDICINE

## 2018-12-21 PROCEDURE — 85025 COMPLETE CBC W/AUTO DIFF WBC: CPT | Performed by: RADIOLOGY

## 2018-12-21 PROCEDURE — A9270 NON-COVERED ITEM OR SERVICE: HCPCS | Mod: GY | Performed by: RADIOLOGY

## 2018-12-21 PROCEDURE — 25000128 H RX IP 250 OP 636: Performed by: RADIOLOGY

## 2018-12-21 PROCEDURE — 25000125 ZZHC RX 250: Performed by: RADIOLOGY

## 2018-12-21 PROCEDURE — C1769 GUIDE WIRE: HCPCS

## 2018-12-21 PROCEDURE — 25500064 ZZH RX 255 OP 636: Performed by: RADIOLOGY

## 2018-12-21 PROCEDURE — B41G1ZZ FLUOROSCOPY OF LEFT LOWER EXTREMITY ARTERIES USING LOW OSMOLAR CONTRAST: ICD-10-PCS | Performed by: RADIOLOGY

## 2018-12-21 PROCEDURE — 85520 HEPARIN ASSAY: CPT | Performed by: RADIOLOGY

## 2018-12-21 PROCEDURE — 25000132 ZZH RX MED GY IP 250 OP 250 PS 637: Mod: GY | Performed by: RADIOLOGY

## 2018-12-21 PROCEDURE — 12000007 ZZH R&B INTERMEDIATE

## 2018-12-21 PROCEDURE — C1760 CLOSURE DEV, VASC: HCPCS

## 2018-12-21 PROCEDURE — 36415 COLL VENOUS BLD VENIPUNCTURE: CPT | Performed by: RADIOLOGY

## 2018-12-21 PROCEDURE — 25000132 ZZH RX MED GY IP 250 OP 250 PS 637: Mod: GY | Performed by: PHYSICIAN ASSISTANT

## 2018-12-21 PROCEDURE — 27210845 ZZH DEVICE INFLATION CR5

## 2018-12-21 PROCEDURE — 85027 COMPLETE CBC AUTOMATED: CPT | Performed by: RADIOLOGY

## 2018-12-21 PROCEDURE — 37214 CESSJ THERAPY CATH REMOVAL: CPT

## 2018-12-21 PROCEDURE — 99233 SBSQ HOSP IP/OBS HIGH 50: CPT | Performed by: INTERNAL MEDICINE

## 2018-12-21 PROCEDURE — 80048 BASIC METABOLIC PNL TOTAL CA: CPT | Performed by: RADIOLOGY

## 2018-12-21 RX ORDER — DIPHENHYDRAMINE HYDROCHLORIDE 50 MG/ML
INJECTION INTRAMUSCULAR; INTRAVENOUS
Status: DISCONTINUED
Start: 2018-12-21 | End: 2018-12-21 | Stop reason: HOSPADM

## 2018-12-21 RX ORDER — DEXTROSE MONOHYDRATE 25 G/50ML
25-50 INJECTION, SOLUTION INTRAVENOUS
Status: DISCONTINUED | OUTPATIENT
Start: 2018-12-21 | End: 2018-12-22 | Stop reason: HOSPADM

## 2018-12-21 RX ORDER — SODIUM CHLORIDE 9 MG/ML
INJECTION, SOLUTION INTRAVENOUS CONTINUOUS
Status: DISCONTINUED | OUTPATIENT
Start: 2018-12-21 | End: 2018-12-22

## 2018-12-21 RX ORDER — DIPHENHYDRAMINE HYDROCHLORIDE 50 MG/ML
50 INJECTION INTRAMUSCULAR; INTRAVENOUS ONCE
Status: COMPLETED | OUTPATIENT
Start: 2018-12-21 | End: 2018-12-21

## 2018-12-21 RX ORDER — FENTANYL CITRATE 50 UG/ML
25-50 INJECTION, SOLUTION INTRAMUSCULAR; INTRAVENOUS EVERY 5 MIN PRN
Status: DISCONTINUED | OUTPATIENT
Start: 2018-12-21 | End: 2018-12-21 | Stop reason: HOSPADM

## 2018-12-21 RX ORDER — FLUMAZENIL 0.1 MG/ML
0.2 INJECTION, SOLUTION INTRAVENOUS
Status: DISCONTINUED | OUTPATIENT
Start: 2018-12-21 | End: 2018-12-21 | Stop reason: HOSPADM

## 2018-12-21 RX ORDER — IODIXANOL 320 MG/ML
150 INJECTION, SOLUTION INTRAVASCULAR ONCE
Status: COMPLETED | OUTPATIENT
Start: 2018-12-21 | End: 2018-12-21

## 2018-12-21 RX ORDER — NICOTINE POLACRILEX 4 MG
15-30 LOZENGE BUCCAL
Status: DISCONTINUED | OUTPATIENT
Start: 2018-12-21 | End: 2018-12-22 | Stop reason: HOSPADM

## 2018-12-21 RX ORDER — LIDOCAINE HYDROCHLORIDE 10 MG/ML
1-30 INJECTION, SOLUTION EPIDURAL; INFILTRATION; INTRACAUDAL; PERINEURAL
Status: COMPLETED | OUTPATIENT
Start: 2018-12-21 | End: 2018-12-21

## 2018-12-21 RX ORDER — CEFAZOLIN SODIUM 2 G/50ML
SOLUTION INTRAVENOUS
Status: DISCONTINUED
Start: 2018-12-21 | End: 2018-12-21 | Stop reason: HOSPADM

## 2018-12-21 RX ORDER — LIDOCAINE HYDROCHLORIDE 10 MG/ML
INJECTION, SOLUTION INFILTRATION; PERINEURAL
Status: DISCONTINUED
Start: 2018-12-21 | End: 2018-12-21 | Stop reason: HOSPADM

## 2018-12-21 RX ORDER — CEFAZOLIN SODIUM 2 G/50ML
2 SOLUTION INTRAVENOUS
Status: COMPLETED | OUTPATIENT
Start: 2018-12-21 | End: 2018-12-21

## 2018-12-21 RX ORDER — FENTANYL CITRATE 50 UG/ML
INJECTION, SOLUTION INTRAMUSCULAR; INTRAVENOUS
Status: DISCONTINUED
Start: 2018-12-21 | End: 2018-12-21 | Stop reason: WASHOUT

## 2018-12-21 RX ORDER — ACETAMINOPHEN 500 MG
500 TABLET ORAL EVERY 6 HOURS PRN
Status: DISCONTINUED | OUTPATIENT
Start: 2018-12-21 | End: 2018-12-21

## 2018-12-21 RX ORDER — NALOXONE HYDROCHLORIDE 0.4 MG/ML
.1-.4 INJECTION, SOLUTION INTRAMUSCULAR; INTRAVENOUS; SUBCUTANEOUS
Status: DISCONTINUED | OUTPATIENT
Start: 2018-12-21 | End: 2018-12-21 | Stop reason: HOSPADM

## 2018-12-21 RX ADMIN — CYCLOBENZAPRINE HYDROCHLORIDE 5 MG: 5 TABLET, FILM COATED ORAL at 00:01

## 2018-12-21 RX ADMIN — SODIUM CHLORIDE: 9 INJECTION, SOLUTION INTRAVENOUS at 18:41

## 2018-12-21 RX ADMIN — BUPROPION HYDROCHLORIDE 300 MG: 150 TABLET, FILM COATED, EXTENDED RELEASE ORAL at 08:45

## 2018-12-21 RX ADMIN — ROSUVASTATIN CALCIUM 40 MG: 20 TABLET, FILM COATED ORAL at 21:29

## 2018-12-21 RX ADMIN — DOCUSATE SODIUM 100 MG: 100 CAPSULE, LIQUID FILLED ORAL at 08:45

## 2018-12-21 RX ADMIN — EZETIMIBE 5 MG: 10 TABLET ORAL at 08:45

## 2018-12-21 RX ADMIN — LIDOCAINE HYDROCHLORIDE 5 ML: 10 INJECTION, SOLUTION INFILTRATION; PERINEURAL at 17:03

## 2018-12-21 RX ADMIN — Medication 3850 UNITS: at 19:16

## 2018-12-21 RX ADMIN — SODIUM CHLORIDE: 9 INJECTION, SOLUTION INTRAVENOUS at 19:46

## 2018-12-21 RX ADMIN — HEPARIN SODIUM 10000 UNITS: 10000 INJECTION, SOLUTION INTRAVENOUS; SUBCUTANEOUS at 16:44

## 2018-12-21 RX ADMIN — DOCUSATE SODIUM 100 MG: 100 CAPSULE, LIQUID FILLED ORAL at 21:29

## 2018-12-21 RX ADMIN — DIPHENHYDRAMINE HYDROCHLORIDE 50 MG: 50 INJECTION, SOLUTION INTRAMUSCULAR; INTRAVENOUS at 17:01

## 2018-12-21 RX ADMIN — ALTEPLASE 0.5 MG/HR: KIT at 07:42

## 2018-12-21 RX ADMIN — IODIXANOL 57 ML: 320 INJECTION, SOLUTION INTRAVASCULAR at 17:28

## 2018-12-21 RX ADMIN — SODIUM CHLORIDE: 9 INJECTION, SOLUTION INTRAVENOUS at 07:43

## 2018-12-21 RX ADMIN — CEFAZOLIN SODIUM 2 G: 2 SOLUTION INTRAVENOUS at 17:03

## 2018-12-21 RX ADMIN — CYCLOBENZAPRINE HYDROCHLORIDE 5 MG: 5 TABLET, FILM COATED ORAL at 21:29

## 2018-12-21 ASSESSMENT — ACTIVITIES OF DAILY LIVING (ADL)
ADLS_ACUITY_SCORE: 14

## 2018-12-21 NOTE — IR NOTE
Graft open. Organized thrombus in the proximal graft covered with a 6mm Viabahn stent. Right femoral puncture closed w angioseal.    Plan: anticoagulate IV heparin. Likely oral anticoagulation on discharge ?6 months. Us of graft in 1 month.

## 2018-12-21 NOTE — IR NOTE
Interventional Radiology Intra-procedural Nursing Note    Patient Name: Angeli Jacobson  Medical Record Number: [id number]  Today's Date: December 21, 2018      Procedure: LE angiogram with lytic f/u and stent placement  Report given to: ANNEMARIE Wright on st [address]  Time pt departs:  1735  :     Other Notes: Pt transferred to IR table. Prepped and draped appropriately. Monitoring equipment applied. NC applied. No complaints of pain at this time. Timeout recorded. TPA and heparin infusion verified with ANNEMARIE Wright. Gtts stopped.  Right groin CDI, soft.  Covered with a 2x2 tegaderm and gauze. Stent card and agnioseal card in chart. 6F angioseal to right groin at 1720. Bedrest for 2 hrs post.     PHOEBE KNOX

## 2018-12-21 NOTE — PROGRESS NOTES
HOSPITALIST CONSULT CHART CHECK:      Hospitalist service was consulted for cross coverage only. We will peripherally follow and chart check throughout the week.        - For vascular medical concerns during business hours M-F, call the [facility] at [phone] to have the rounding/on call Vascular Medicine (NOT VASCULAR SURGERY) MD paged.      - After business hours M-F, for medical concerns on this patient, please page hospitalist staff.      - For vascular surgical questions, please page the appropriate surgeon (primary vascular surgeon or on call vascular surgeon) based upon the time of day.       Alonzo Méndez PA-C  Hospitalist Service  [facility]

## 2018-12-21 NOTE — PROGRESS NOTES
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 Patient has developed occlusion of graft and undergoing lytic therapy   --Keep NPO for IR this morning   --Continue with lytic therapy and will re-check angiogram   --Benitez catheter will be discontinued after IR angiogram   --Bedrest    Active Problems:    Femoral-popliteal bypass graft occlusion, left (H)      Min Pilar Be MD    Interval History   No acute events overnight. Patient doing well this morning. No pain in the LLE. Calf is soft.     Physical Exam   Temp: 97.7  F (36.5  C) Temp src: Oral BP: 139/78 Pulse: 74 Heart Rate: 70 Resp: 16 SpO2: 96 % O2 Device: None (Room air)    Vitals:    12/19/18 1218   Weight: 68.7 kg (151 lb 6.4 oz)     Vital Signs with Ranges  Temp:  [97.6  F (36.4  C)-98.7  F (37.1  C)] 97.7  F (36.5  C)  Pulse:  [67-84] 74  Heart Rate:  [67-78] 70  Resp:  [11-24] 16  BP: (110-144)/(62-83) 139/78  SpO2:  [95 %-98 %] 96 %  I/O last 3 completed shifts:  In: 1490 [P.O.:840; I.V.:650]  Out: 4900 [Urine:4900]    PE:   NAD, awake and alert  Chest: S1 S2 present, RRR, no wheezing   Abd: soft, NT, ND, no rebound or guarding  Ex: LLE calf soft, more swollen compared to right, but patient says it is related to her chronic lymphedema. Doppler multiphasic PT/DP signals. Right groin catheter site clean. Motor and sensory intact in left foot.     Medications     alteplase (ACTIVASE) 5 mg/500 mL infusion (LINE 1) 0.5 mg/hr (12/21/18 0742)     heparin 500 Units/hr (12/20/18 1915)     sodium chloride 75 mL/hr at 12/20/18 1915        buPROPion  300 mg Oral QAM     docusate sodium  100 mg Oral BID     ezetimibe  10 mg Oral Daily     rosuvastatin  40 mg Oral At Bedtime       Data   Lab Results   Component Value Date    WBC 6.2 12/21/2018     Lab Results   Component Value Date    RBC 4.13 12/21/2018     Lab Results   Component Value Date    HGB  12.6 12/21/2018     Lab Results   Component Value Date    HCT 37.9 12/21/2018     Lab Results   Component Value Date    MCV 92 12/21/2018     Lab Results   Component Value Date    MCH 30.5 12/21/2018     Lab Results   Component Value Date    MCHC 33.2 12/21/2018     Lab Results   Component Value Date    RDW 13.1 12/21/2018     Lab Results   Component Value Date     12/21/2018

## 2018-12-21 NOTE — PROGRESS NOTES
[facility]    Vascular Medicine Progress Note    Date of Service (when I saw the patient): 12/21/2018          Physician Supervisory Attestation:   I have reviewed and discussed with the physician assistant their history, physical and plan and independently interviewed and examined Angeli Jacobson and agree with the plan as stated in the physician assistant note.    Tolerating TPA, left foot well perfused , palapble DP and PT pulses this am, pain well controlled.  LLE chronic swelling ( lymphedema) unchanged.   Flexeril helping neck  Pain.  Lysis recheck later today  Cont IVF, follow lysis protocol  I V heparin after completion of lysis ( per IR protocol, pharmacy to manage) . Then favor restarting Xarelto 15 bid for 3 weeks then 20 mg daily with food..   Anticipate discharge in the next 1-2 days.  Increase zetia to 10 mg daily    Patient care  Time spent 35 minutes today    Tiesha Prince MD 12/21/2018           Assessment & Plan   Angeli Jacobson is a 68 year old female who was admitted on 12/19/2018.     1. Left SFA PTFE interposition graft occlusion with initiation of catheter-directed lysis 4/30/18 followed by mechanical thrombectomy with possis, angioplasty, and placement of Viabahn stent in the proximal bypass graft as well as well as primary repair in the OR of right femoral artery access site 5/1/18 now presenting with re-occlusion of graft after stopping her Xarelto for a few days earlier this month    Initiation of CD lysis 12/19/18-    Assessment:   -Dopplerable DP and PT  -C/o neck spasm due to hospital bed and lying flat - Flexeril helping  -Access site looks good.  -Due to stopping Xarelto on her own for a few days, NOT a failure of Xarelto.     Plan:   -Lysis recheck later today  -Cont IVF, follow lysis protocol  -IV heparin after completion of lysis. Then favor restarting Xarelto.   -Anticipate discharge in the next 1-2 days.     2.  Hyperlipidemia      Assessment: LDL was 173 in 5/2018 - now down to 92 on Crestor 40 and Zetia 5. Still not at goal.   Plan: Continue Crestor 40 mg daily. Increase Zetia to 10 mg daily. Repeat lipid panel in 3 months. If LDL still above 70, needs to see Vascular Medicine for further recommendations (Ex. Repatha).     3. Depression      Assessment: This has been stable.   Plan: Continue outpatient bupropion.        Interval History     Doing well. Awaiting lysis recheck. No complaints presently. Neck pain better with Flexeril.       Physical Exam   Temp: 97.7  F (36.5  C) Temp src: Oral BP: 129/74 Pulse: 74 Heart Rate: 75 Resp: 16 SpO2: 95 % O2 Device: None (Room air)    Vitals:    12/19/18 1218   Weight: 68.7 kg (151 lb 6.4 oz)     Vital Signs with Ranges  Temp:  [97.7  F (36.5  C)-98.7  F (37.1  C)] 97.7  F (36.5  C)  Pulse:  [67-84] 74  Heart Rate:  [67-84] 75  Resp:  [10-24] 16  BP: (110-142)/() 129/74  SpO2:  [95 %-98 %] 95 %  I/O last 3 completed shifts:  In: 1490 [P.O.:840; I.V.:650]  Out: 4900 [Urine:4900]    Constitutional: Awake, alert, cooperative, no apparent distress, and appears stated age.  Eyes: Lids and lashes normal, pupils equal, round and reactive to light, extra ocular muscles intact, sclera clear, conjunctiva normal.  ENT: Normocephalic, without obvious abnormality, c/o neck pain  Respiratory: No increased work of breathing, good air exchange, clear to auscultation bilaterally, no crackles or wheezing.  Cardiovascular: Regular rate and rhythm, normal S1 and S2, no S3 or S4, and no murmur noted.  GI:  normal bowel sounds, soft, non-distended, non-tender  Skin: No bruising or bleeding, normal skin color, texture, turgor, no redness, warmth, or swelling, no rashes, lytics site c/d/i.  Musculoskeletal: There is no redness, warmth, or swelling of the joints.  Full range of motion noted.  Motor strength is 5 out of 5 all extremities bilaterally.  Tone is normal.  Neurologic: Awake, alert,  oriented to name, place and time.  Cranial nerves II-XII are grossly intact.  Motor is 5 out of 5 bilaterally.   Neuropsychiatric: Calm, normal eye contact, alert, normal affect, oriented to self, place, time and situation, memory for past and recent events intact and thought process normal.  Pulses: dopplerable left DP and PT    Medications     alteplase (ACTIVASE) 5 mg/500 mL infusion (LINE 1) 0.5 mg/hr (12/21/18 0800)     heparin 500 Units/hr (12/21/18 0800)     sodium chloride 75 mL/hr at 12/21/18 0743       buPROPion  300 mg Oral QAM     docusate sodium  100 mg Oral BID     ezetimibe  10 mg Oral Daily     rosuvastatin  40 mg Oral At Bedtime       Data   Recent Labs   Lab 12/21/18  0600 12/20/18 2003 12/20/18  0640 12/19/18  1241 12/18/18  1041   WBC 6.2  --  7.9  --  6.8   HGB 12.6  --  12.5  --  13.7   MCV 92  --  92  --  95     --  163  --  253   INR  --   --   --  0.97  --     143  --   --  139   POTASSIUM 3.4 3.5  --   --  4.1   CHLORIDE 111* 110*  --   --  105   CO2 24 25  --   --  27   BUN 5* 6*  --   --  17   CR 0.58 0.53 0.62  --  0.80   ANIONGAP 7 8  --   --  7   LONG 7.9* 8.1*  --   --  9.5   GLC 88 158*  --   --  91

## 2018-12-21 NOTE — PROGRESS NOTES
Spoke with Dr. Aragon on floor regarding 3000ml of clear urine output from [id number], despite patient being NPO NOC. Order for BMP to be drawn, given verbally. Order to follow up with cross coverage hospitalist on call for further orders pending results.

## 2018-12-22 VITALS
TEMPERATURE: 98 F | DIASTOLIC BLOOD PRESSURE: 83 MMHG | HEIGHT: 67 IN | BODY MASS INDEX: 23.76 KG/M2 | WEIGHT: 151.4 LBS | SYSTOLIC BLOOD PRESSURE: 132 MMHG | RESPIRATION RATE: 16 BRPM | OXYGEN SATURATION: 98 % | HEART RATE: 80 BPM

## 2018-12-22 LAB
ANION GAP SERPL CALCULATED.3IONS-SCNC: 9 MMOL/L (ref 3–14)
BASOPHILS # BLD AUTO: 0 10E9/L (ref 0–0.2)
BASOPHILS NFR BLD AUTO: 0.4 %
BUN SERPL-MCNC: 7 MG/DL (ref 7–30)
CALCIUM SERPL-MCNC: 8.1 MG/DL (ref 8.5–10.1)
CHLORIDE SERPL-SCNC: 108 MMOL/L (ref 94–109)
CO2 SERPL-SCNC: 26 MMOL/L (ref 20–32)
CREAT SERPL-MCNC: 0.7 MG/DL (ref 0.52–1.04)
DIFFERENTIAL METHOD BLD: NORMAL
EOSINOPHIL # BLD AUTO: 0.1 10E9/L (ref 0–0.7)
EOSINOPHIL NFR BLD AUTO: 1.2 %
ERYTHROCYTE [DISTWIDTH] IN BLOOD BY AUTOMATED COUNT: 13.1 % (ref 10–15)
GFR SERPL CREATININE-BSD FRML MDRD: 88 ML/MIN/{1.73_M2}
GLUCOSE SERPL-MCNC: 95 MG/DL (ref 70–99)
HCT VFR BLD AUTO: 38.9 % (ref 35–47)
HGB BLD-MCNC: 12.8 G/DL (ref 11.7–15.7)
IMM GRANULOCYTES # BLD: 0 10E9/L (ref 0–0.4)
IMM GRANULOCYTES NFR BLD: 0.3 %
LMWH PPP CHRO-ACNC: 0.24 IU/ML
LMWH PPP CHRO-ACNC: 0.44 IU/ML
LYMPHOCYTES # BLD AUTO: 1.9 10E9/L (ref 0.8–5.3)
LYMPHOCYTES NFR BLD AUTO: 25.6 %
MCH RBC QN AUTO: 30.3 PG (ref 26.5–33)
MCHC RBC AUTO-ENTMCNC: 32.9 G/DL (ref 31.5–36.5)
MCV RBC AUTO: 92 FL (ref 78–100)
MONOCYTES # BLD AUTO: 0.7 10E9/L (ref 0–1.3)
MONOCYTES NFR BLD AUTO: 8.7 %
NEUTROPHILS # BLD AUTO: 4.8 10E9/L (ref 1.6–8.3)
NEUTROPHILS NFR BLD AUTO: 63.8 %
NRBC # BLD AUTO: 0 10*3/UL
NRBC BLD AUTO-RTO: 0 /100
PLATELET # BLD AUTO: 156 10E9/L (ref 150–450)
POTASSIUM SERPL-SCNC: 3.5 MMOL/L (ref 3.4–5.3)
RBC # BLD AUTO: 4.22 10E12/L (ref 3.8–5.2)
SODIUM SERPL-SCNC: 143 MMOL/L (ref 133–144)
WBC # BLD AUTO: 7.5 10E9/L (ref 4–11)

## 2018-12-22 PROCEDURE — 99233 SBSQ HOSP IP/OBS HIGH 50: CPT | Performed by: INTERNAL MEDICINE

## 2018-12-22 PROCEDURE — 36415 COLL VENOUS BLD VENIPUNCTURE: CPT | Performed by: RADIOLOGY

## 2018-12-22 PROCEDURE — A9270 NON-COVERED ITEM OR SERVICE: HCPCS | Mod: GY | Performed by: PHYSICIAN ASSISTANT

## 2018-12-22 PROCEDURE — 80048 BASIC METABOLIC PNL TOTAL CA: CPT | Performed by: RADIOLOGY

## 2018-12-22 PROCEDURE — 85025 COMPLETE CBC W/AUTO DIFF WBC: CPT | Performed by: RADIOLOGY

## 2018-12-22 PROCEDURE — 25000132 ZZH RX MED GY IP 250 OP 250 PS 637: Mod: GY | Performed by: RADIOLOGY

## 2018-12-22 PROCEDURE — 36415 COLL VENOUS BLD VENIPUNCTURE: CPT | Performed by: SURGERY

## 2018-12-22 PROCEDURE — 85520 HEPARIN ASSAY: CPT | Performed by: SURGERY

## 2018-12-22 PROCEDURE — 85520 HEPARIN ASSAY: CPT | Performed by: RADIOLOGY

## 2018-12-22 PROCEDURE — 25000132 ZZH RX MED GY IP 250 OP 250 PS 637: Mod: GY | Performed by: SURGERY

## 2018-12-22 PROCEDURE — 25000128 H RX IP 250 OP 636: Performed by: RADIOLOGY

## 2018-12-22 PROCEDURE — A9270 NON-COVERED ITEM OR SERVICE: HCPCS | Mod: GY | Performed by: SURGERY

## 2018-12-22 PROCEDURE — 99232 SBSQ HOSP IP/OBS MODERATE 35: CPT | Performed by: SURGERY

## 2018-12-22 PROCEDURE — 25000132 ZZH RX MED GY IP 250 OP 250 PS 637: Mod: GY | Performed by: PHYSICIAN ASSISTANT

## 2018-12-22 PROCEDURE — A9270 NON-COVERED ITEM OR SERVICE: HCPCS | Mod: GY | Performed by: RADIOLOGY

## 2018-12-22 RX ORDER — EZETIMIBE 10 MG/1
10 TABLET ORAL DAILY
Qty: 30 TABLET | Refills: 0 | Status: SHIPPED | OUTPATIENT
Start: 2018-12-22 | End: 2018-12-28

## 2018-12-22 RX ORDER — CYCLOBENZAPRINE HCL 5 MG
5 TABLET ORAL 3 TIMES DAILY PRN
Qty: 6 TABLET | Refills: 0 | Status: SHIPPED | OUTPATIENT
Start: 2018-12-22 | End: 2019-01-31

## 2018-12-22 RX ADMIN — RIVAROXABAN 15 MG: 15 TABLET, FILM COATED ORAL at 11:55

## 2018-12-22 RX ADMIN — DOCUSATE SODIUM 100 MG: 100 CAPSULE, LIQUID FILLED ORAL at 09:26

## 2018-12-22 RX ADMIN — EZETIMIBE 10 MG: 10 TABLET ORAL at 09:27

## 2018-12-22 RX ADMIN — BUPROPION HYDROCHLORIDE 300 MG: 150 TABLET, FILM COATED, EXTENDED RELEASE ORAL at 11:46

## 2018-12-22 RX ADMIN — SODIUM CHLORIDE: 9 INJECTION, SOLUTION INTRAVENOUS at 05:34

## 2018-12-22 ASSESSMENT — ACTIVITIES OF DAILY LIVING (ADL)
ADLS_ACUITY_SCORE: 14
ADLS_ACUITY_SCORE: 12

## 2018-12-22 NOTE — PROGRESS NOTES
[facility]  Vascular Medicine Progress Note     For vascular medical concerns on this patient during business hours M-F, call the [facility] at [phone] to have the rounding / on call Vascular Medicine (NOT VASCULAR SURGERY) MD paged. After business hours M-F,  for medical concerns on this patient, please page hospitalist staff. For vascular surgical questions on this patient , please page the appropriate surgeon (primary vascular surgeon or on call vascular surgeon) based upon the time of day.         Assessment and Plan:       1. Left SFA PTFE interposition graft occlusion with initiation of catheter-directed lysis 4/30/18 followed by mechanical thrombectomy with possis, angioplasty, and placement of Viabahn stent in the proximal bypass graft as well as well as primary repair in the OR of right femoral artery access site 5/1/18 now presenting with re-occlusion of graft      CD lysis 12/19/18-12/21/18     Lysis completed    Her occlusion is not due to a failure of Xarelto. It is due to patient noncompliance. She needs Xarelto 15 mg PO BID times 21 days , then 20 mg daily indeifnitely thereafter.         2. Hyperlipidemia      Not at goal on Zetia 5 , Crestor 40 daily,  Therefore we will have her increase her Zetia to 10 mg daily. She should have a lipid panel rechecked in 3 months. If her lipids are still not at goal (LDL<70), then she should be seen in the [facility] for further recommendations.      3. Depression      This has been stable. Continue outpatient bupropion.                    Interval History:   doing well; no cp, sob, n/v/d, or abd pain.              Review of Systems:   The 10 point Review of Systems is negative other than noted in the HPI             Medications:       buPROPion  300 mg Oral QAM     docusate sodium  100 mg Oral BID     ezetimibe  10 mg Oral Daily     rivaroxaban ANTICOAGULANT  15 mg Oral Once     rosuvastatin  40 mg Oral At Bedtime                   Physical Exam:     Patient Vitals for the past 24 hrs:   BP Temp Temp src Pulse Heart Rate Resp SpO2   12/22/18 0807 130/79 98  F (36.7  C) Oral 87 87 16 98 %   12/22/18 0320 134/87 98.6  F (37  C) Oral -- 93 16 95 %   12/21/18 2354 115/78 98.5  F (36.9  C) Oral -- 75 16 95 %   12/21/18 2100 138/74 -- -- -- 77 16 99 %   12/21/18 2030 135/77 -- -- -- 75 16 95 %   12/21/18 2000 138/81 98  F (36.7  C) Axillary -- 82 14 97 %   12/21/18 1930 (!) 142/93 -- -- -- 79 16 96 %   12/21/18 1830 135/75 -- -- -- 80 12 95 %   12/21/18 1725 141/77 -- -- 75 76 10 92 %   12/21/18 1720 140/70 -- -- 80 79 16 98 %   12/21/18 1715 140/77 -- -- 78 79 17 97 %   12/21/18 1710 134/82 -- -- 80 78 17 97 %   12/21/18 1705 142/77 -- -- 79 82 19 97 %   12/21/18 1700 137/76 -- -- 79 79 12 98 %   12/21/18 1655 135/74 -- -- 81 82 14 96 %   12/21/18 1650 134/75 -- -- 83 83 15 99 %   12/21/18 1645 139/76 -- -- 79 79 15 99 %   12/21/18 1640 140/75 -- -- 80 79 12 97 %   12/21/18 1611 -- 98.2  F (36.8  C) Oral -- -- -- --   12/21/18 1600 137/75 -- -- 80 81 21 96 %   12/21/18 1400 127/70 -- -- 75 75 15 97 %   12/21/18 1200 129/74 -- -- 74 75 16 97 %     Wt Readings from Last 4 Encounters:   12/19/18 68.7 kg (151 lb 6.4 oz)   12/18/18 68.5 kg (151 lb)   12/13/18 68.9 kg (152 lb)   12/13/18 68.9 kg (152 lb)       Intake/Output Summary (Last 24 hours) at 12/22/2018 1104  Last data filed at 12/22/2018 1000  Gross per 24 hour   Intake 1920 ml   Output 3525 ml   Net -1605 ml     Constitutional: normal  Eyes: normal  ENT: normal  Neck: Supple, symmetrical, trachea midline, no adenopathy, thyroid symmetric, not enlarged and no tenderness, skin normal.  Hematologic / Lymphatic: normal  Back: normal  Lungs: No increased work of breathing, good air exchange, clear to auscultation bilaterally, no crackles or wheezing.  Cardiovascular: Regular rate and rhythm, normal S1 and S2, no S3 or S4, and no murmur noted.  Chest / Breast: normal  Abdomen:  normal  Genitourinary: normal  Musculoskeletal: normal  Neurologic: normal  Neuropsychiatric: normal  Skin: normal           Data:     Results for orders placed or performed during the hospital encounter of 12/19/18 (from the past 24 hour(s))   CBC with platelets   Result Value Ref Range    WBC 7.3 4.0 - 11.0 10e9/L    RBC Count 4.34 3.8 - 5.2 10e12/L    Hemoglobin 13.4 11.7 - 15.7 g/dL    Hematocrit 40.2 35.0 - 47.0 %    MCV 93 78 - 100 fl    MCH 30.9 26.5 - 33.0 pg    MCHC 33.3 31.5 - 36.5 g/dL    RDW 13.0 10.0 - 15.0 %    Platelet Count 146 (L) 150 - 450 10e9/L   Heparin 10a Level   Result Value Ref Range    Heparin 10A Level 0.44 IU/mL   CBC with platelets differential   Result Value Ref Range    WBC 7.5 4.0 - 11.0 10e9/L    RBC Count 4.22 3.8 - 5.2 10e12/L    Hemoglobin 12.8 11.7 - 15.7 g/dL    Hematocrit 38.9 35.0 - 47.0 %    MCV 92 78 - 100 fl    MCH 30.3 26.5 - 33.0 pg    MCHC 32.9 31.5 - 36.5 g/dL    RDW 13.1 10.0 - 15.0 %    Platelet Count 156 150 - 450 10e9/L    Diff Method Automated Method     % Neutrophils 63.8 %    % Lymphocytes 25.6 %    % Monocytes 8.7 %    % Eosinophils 1.2 %    % Basophils 0.4 %    % Immature Granulocytes 0.3 %    Nucleated RBCs 0 0 /100    Absolute Neutrophil 4.8 1.6 - 8.3 10e9/L    Absolute Lymphocytes 1.9 0.8 - 5.3 10e9/L    Absolute Monocytes 0.7 0.0 - 1.3 10e9/L    Absolute Eosinophils 0.1 0.0 - 0.7 10e9/L    Absolute Basophils 0.0 0.0 - 0.2 10e9/L    Abs Immature Granulocytes 0.0 0 - 0.4 10e9/L    Absolute Nucleated RBC 0.0    Heparin 10a Level   Result Value Ref Range    Heparin 10A Level 0.24 IU/mL   Basic metabolic panel   Result Value Ref Range    Sodium 143 133 - 144 mmol/L    Potassium 3.5 3.4 - 5.3 mmol/L    Chloride 108 94 - 109 mmol/L    Carbon Dioxide 26 20 - 32 mmol/L    Anion Gap 9 3 - 14 mmol/L    Glucose 95 70 - 99 mg/dL    Urea Nitrogen 7 7 - 30 mg/dL    Creatinine 0.70 0.52 - 1.04 mg/dL    GFR Estimate 88 >60 mL/min/[1.73_m2]    GFR Estimate If Black >90 >60  mL/min/[1.73_m2]    Calcium 8.1 (L) 8.5 - 10.1 mg/dL

## 2018-12-22 NOTE — PROGRESS NOTES
HOSPITALIST CONSULT CHART CHECK:     Hospitalist service was consulted for cross coverage only. We will peripherally follow and chart check throughout the week.       - For vascular medical concerns during business hours M-F, call the [facility] at [phone] to have the rounding/on call Vascular Medicine (NOT VASCULAR SURGERY) MD paged.     - After business hours M-F, for medical concerns on this patient, please page hospitalist staff.     - For vascular surgical questions, please page the appropriate surgeon (primary vascular surgeon or on call vascular surgeon) based upon the time of day.     ELVA Hinojosa, CNP  Hospitalist Service, House Officer  [facility]     Text Page  Pager: [phone]

## 2018-12-22 NOTE — DISCHARGE SUMMARY
Admit Date:     12/19/2018   Discharge Date:           PREOPERATIVE DIAGNOSES:     1.  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   2.  Chronic left leg lymphedema.   3.  Hyperlipidemia.   4.  Cervicalgia.      PROCEDURES PERFORMED DURING HOSPITALIZATION:  Selective left lower extremity angiography with thrombolysis of left superficial femoral artery PTFE interposition graft and placement of new 6 mm Viabahn covered stent.  That procedure was performed from 12/19/18 through 12/21/18.      HOSPITAL COURSE:  Mrs. 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  She has chronic left leg lymphedema as a result for which she wears a thigh-high prescription compression stocking.  In April of this year she suffered her first graft thrombosis.  She underwent successful thrombolysis and was empirically started on Xarelto and aspirin 81 mg p.o. q. day.  She read about some side effects of long-term Xarelto and she spontaneously decided to stop taking it on her own roughly 2 weeks ago.  Shortly thereafter, she had recurrent left foot paresthesias and short distance claudication.  Imaging confirmed re-thrombosis of the graft.  After a discussion she was electively readmitted and underwent repeat left leg angiography.  We were easily able to cross the occluded graft and she was initiated on thrombolysis for roughly 48 hours.  There was some residual organized thrombus in the proximal left SFA and a second 6 mm Viabahn stent was placed to cover this.  There was an excellent technical result.  There were no issues with the right femoral access site.  She has easily palpable pedal pulses.  Vascular Medicine was consulted during this hospitalization.  We do not feel that this graft  failure is a problem with the Xarelto rather the patient spontaneously discontinued the medication.  We therefore have decided to restart her back on Xarelto.  She was maintained on a heparin drip overnight.  We are restarting Xarelto today and continuing her on her other medications.  We are increasing to Zetia to 10 mg p.o. q. day.  I have thoroughly reviewed all of these instructions with Mrs. Elaine.  All of her questions were answered and she verbalizes full understanding.      DISCHARGE INSTRUCTIONS:   1.  Activity -- she is to avoid heavy lifting for another 48 hours.  Routine precautions for the right femoral puncture site.  Following this, she may resume full activities without restrictions.   2.  She may remove the dressing over the right femoral puncture site tomorrow.  She may begin showering tomorrow.   3.  Diet:  Low saturated Low-fat diet.   4.  Medications:  She is to resume all of her prior home medications with the following changes -- Zetia is being increased to 10 mg p.o. q. day.  She is to take Xarelto 15 mg p.o. b.i.d. with meals x3 weeks.  Following that, she will resume Xarelto 20 mg p.o. q. day.  She also had some issues with cervicalgia during this hospitalization and was on some p.r.n. Flexeril.  I have given her a prescription for Flexeril 5 mg p.o. 3 times daily p.r.n. muscle spasms, 6 pills dispensed, no refills.   5.  Followup is to be with Dr. Elijah Owens in 2-3 weeks for a clinic visit only.      Total time spent face to face was 25 minutes, greater than 50% spent providing counseling and education.         JACKSON OWENS MD             D: 2018   T: 2018   MT: JOVON      Name:     DIANA ELAINE   MRN:      [id number]        Account:        [id number]   :      1950           Admit Date:     2018                                  Discharge Date:       Document: [id number]

## 2018-12-22 NOTE — DISCHARGE INSTRUCTIONS
DISCHARGE INSTRUCTIONS AFTER ANGIOGRAM      For 24 hours   * Drink extra fluids.  Eat a diet low in saturated fat.  No smoking or alcohol.   * Keep the bandage on.  Check the puncture site every 2 to 3 hours while awake.   * Do not drive or work heavy machinery.   * Do not make any legal or other important decisions.    The day after your exam, you may take a shower.  Wait five days before taking a bath.    For 2 days   * If you cough, sneeze, or laugh, press firmly on the puncture site.   * Do not stoop or squat.   * Do not have sex.    For 3 days   * Avoid hard activity such as lifting, pushing or exercise.  This will help prevent     bleeding.    * Do not use lotion or powder near the site.   * Keep drinking extra fluids    For 5 days, do not swim or take a bath.    Medicines  _____    Wait 48 hours before taking medicines that contain metformin     Glucophage or  Glucovance).  ______ Do not take aspirin or ibuprofen (Advil) for  _____  Days.  __X__  Take your other medications, including blood thinners, unless your doctor    Tells you not to.    If you have problems or questions  Call your doctor or radiology clinic if you have:    A fever of 101  (38.3  C) or higher (under the tongue)    Increased pain, redness or a hard lump at the puncture site.    Your arm or leg feels cool, numb or changes color.    New pain in the back or lower belly that you cannot relieve with Tylenol.    If you have bleeding or increased swelling, lie down and press firmly on the site. If it s a small amount, call your clinic. If it s a large amount, call 911, even if the bleeding stops.    If you have questions or your original symptoms do not improve, call:    ___ [facility]:     Call [facility] (during business hours) ([phone])

## 2018-12-22 NOTE — PROGRESS NOTES
Resting comfortably without complaints.  AVSS  Right groin puncture site clear.  2+ palpable DP pulses bilaterally.    IMPRESSION:  Status post thrombolysis of occluded proximal left superficial femoral artery PTFE interposition graft.  Placement of second new 6 mm Viabahn stent.  Easily palpable DP pulses bilaterally.  Right groin access site clear.    RECOMMENDATION:  I will discharge Mrs. Jacobson to home today.  Per Dr. Prince's instructions she will be maintained on Xarelto 15 mg p.o. twice daily times 3 weeks.  Following that she will take Xarelto 20 mg p.o. daily.  I will increase her Zetia to 10 mg daily.  She may remove the dressing from her right groin tomorrow and begin showering tomorrow.  Vascular surgical follow-up will be with me in 2-3 weeks for a clinic visit only.  All discharge instructions were extensively reviewed with Mrs. Jacobson.  She verbalizes full understanding to the above and all of her questions were answered.    Total face-to-face time was 25 minutes, greater than 50% spent providing counseling and education.        Elijha Owens MD

## 2018-12-22 NOTE — PROGRESS NOTES
A&Ox4, VSS,on RA.  R groin incision CDI. Independent. Discharge instructions reviewed, questions answered. Pt. Collected belongings. Pt. Wheeled down in wheelchair to home with .

## 2018-12-22 NOTE — PROVIDER NOTIFICATION
BEE Calvin @ 18:37 on 12/21/18.  Okay to change consult to do boluses to load and to adjust.  Okay to change rate from 900 units/hr to 750 units/hr as per protocol to start and give Heparin bolus dose 3850 units x 1 dose.  Anat Delgadillo [facility]

## 2018-12-24 ENCOUNTER — TELEPHONE (OUTPATIENT)
Dept: FAMILY MEDICINE | Facility: CLINIC | Age: 68
End: 2018-12-24

## 2018-12-24 NOTE — TELEPHONE ENCOUNTER
"PREOPERATIVE DIAGNOSES:     1.  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   2.  Chronic left leg lymphedema.     PROCEDURES PERFORMED DURING HOSPITALIZATION:  Selective left lower extremity angiography with thrombolysis of left superficial femoral artery PTFE interposition graft and placement of new 6 mm Viabahn covered stent.  That procedure was performed from 12/19/18 through 12/21/18.     DISCHARGE INSTRUCTIONS:   1.  Activity -- she is to avoid heavy lifting for another 48 hours.  Routine precautions for the right femoral puncture site.  Following this, she may resume full activities without restrictions.   2.  She may remove the dressing over the right femoral puncture site tomorrow.  She may begin showering tomorrow.   3.  Diet:  Low saturated Low-fat diet.   4.  Medications:  She is to resume all of her prior home medications with the following changes -- Zetia is being increased to 10 mg p.o. q. day.  She is to take Xarelto 15 mg p.o. b.i.d. with meals x3 weeks.  Following that, she will resume Xarelto 20 mg p.o. q. day.  She also had some issues with cervicalgia during this hospitalization and was on some p.r.n. Flexeril.  I have given her a prescription for Flexeril 5 mg p.o. 3 times daily p.r.n. muscle spasms, 6 pills dispensed, no refills.   5.  Followup is to be with Dr. Elijah Owens in 2-3 weeks for a clinic visit only.     [phone] (home) NONE (work)  Telephone Information:   Mobile [phone]     Hospital/TCU/ED for chronic condition Discharge Protocol    \"Hi, my name is Azul James, a registered nurse, and I am calling from [facility].  I am calling to follow up and see how things are going for you after your recent emergency visit/hospital/TCU stay.\"    Tell me how you are doing now that you are home?\" no real pain, few spasms      Discharge Instructions    \"Let's review your discharge instructions.  What is/are the follow-up recommendations?  Pt. " "Response: no stooping, lifting     \"Has an appointment with your primary care provider been scheduled?\"   No (schedule appointment), scheduled appt for Jan 2    \"When you see the provider, I would recommend that you bring your medications with you.\"    Medications    \"Tell me what changed about your medicines when you discharged?\"    Changes to chronic meds?    0-1    \"What questions do you have about your medications?\"    None     New diagnoses of heart failure, COPD, diabetes, or MI?    No     Medication reconciliation completed? Yes  Was MTM referral placed (*Make sure to put transitions as reason for referral)?   No    Call Summary    \"What questions or concerns do you have about your recent visit and your follow-up care?\"     none    \"If you have questions or things don't continue to improve, we encourage you contact us through the main clinic number (give number).  Even if the clinic is not open, triage nurses are available 24/7 to help you.     We would like you to know that our clinic has extended hours (provide information).  We also have urgent care (provide details on closest location and hours/contact info)\"      \"Thank you for your time and take care!\"       Azul James RN, BS  Clinical Nurse Triage.      "

## 2018-12-26 ENCOUNTER — HOSPITAL ENCOUNTER (EMERGENCY)
Facility: CLINIC | Age: 68
Discharge: HOME | End: 2018-12-26
Admitting: EMERGENCY MEDICINE
Payer: MEDICARE

## 2018-12-26 ENCOUNTER — MYC MEDICAL ADVICE (OUTPATIENT)
Dept: FAMILY MEDICINE | Facility: CLINIC | Age: 68
End: 2018-12-26

## 2018-12-26 ENCOUNTER — NURSE TRIAGE (OUTPATIENT)
Dept: NURSING | Facility: CLINIC | Age: 68
End: 2018-12-26

## 2018-12-26 VITALS
OXYGEN SATURATION: 97 % | TEMPERATURE: 97.6 F | BODY MASS INDEX: 24.11 KG/M2 | SYSTOLIC BLOOD PRESSURE: 135 MMHG | HEIGHT: 66 IN | RESPIRATION RATE: 16 BRPM | WEIGHT: 150 LBS | HEART RATE: 75 BPM | DIASTOLIC BLOOD PRESSURE: 87 MMHG

## 2018-12-26 DIAGNOSIS — T83.511A URINARY TRACT INFECTION ASSOCIATED WITH INDWELLING URETHRAL CATHETER, INITIAL ENCOUNTER (H): ICD-10-CM

## 2018-12-26 DIAGNOSIS — N30.01 ACUTE CYSTITIS WITH HEMATURIA: ICD-10-CM

## 2018-12-26 DIAGNOSIS — N39.0 URINARY TRACT INFECTION ASSOCIATED WITH INDWELLING URETHRAL CATHETER, INITIAL ENCOUNTER (H): ICD-10-CM

## 2018-12-26 LAB
ALBUMIN UR-MCNC: 10 MG/DL
APPEARANCE UR: ABNORMAL
BILIRUB UR QL STRIP: NEGATIVE
COLOR UR AUTO: YELLOW
GLUCOSE UR STRIP-MCNC: NEGATIVE MG/DL
HGB UR QL STRIP: ABNORMAL
KETONES UR STRIP-MCNC: NEGATIVE MG/DL
LEUKOCYTE ESTERASE UR QL STRIP: ABNORMAL
NITRATE UR QL: NEGATIVE
PH UR STRIP: 7 PH (ref 5–7)
RBC #/AREA URNS AUTO: 28 /HPF (ref 0–2)
SOURCE: ABNORMAL
SP GR UR STRIP: 1.01 (ref 1–1.03)
SQUAMOUS #/AREA URNS AUTO: <1 /HPF (ref 0–1)
UROBILINOGEN UR STRIP-MCNC: NORMAL MG/DL (ref 0–2)
WBC #/AREA URNS AUTO: >182 /HPF (ref 0–5)

## 2018-12-26 PROCEDURE — 81001 URINALYSIS AUTO W/SCOPE: CPT | Performed by: EMERGENCY MEDICINE

## 2018-12-26 PROCEDURE — 87086 URINE CULTURE/COLONY COUNT: CPT | Performed by: PHYSICIAN ASSISTANT

## 2018-12-26 PROCEDURE — 99283 EMERGENCY DEPT VISIT LOW MDM: CPT

## 2018-12-26 PROCEDURE — 87088 URINE BACTERIA CULTURE: CPT | Performed by: PHYSICIAN ASSISTANT

## 2018-12-26 PROCEDURE — 25000132 ZZH RX MED GY IP 250 OP 250 PS 637: Mod: GY | Performed by: PHYSICIAN ASSISTANT

## 2018-12-26 PROCEDURE — A9270 NON-COVERED ITEM OR SERVICE: HCPCS | Mod: GY | Performed by: PHYSICIAN ASSISTANT

## 2018-12-26 PROCEDURE — 87186 SC STD MICRODIL/AGAR DIL: CPT | Performed by: PHYSICIAN ASSISTANT

## 2018-12-26 RX ORDER — CEPHALEXIN 500 MG/1
500 CAPSULE ORAL 4 TIMES DAILY
Qty: 56 CAPSULE | Refills: 0 | Status: SHIPPED | OUTPATIENT
Start: 2018-12-26 | End: 2019-04-30

## 2018-12-26 RX ORDER — CEPHALEXIN 500 MG/1
500 CAPSULE ORAL ONCE
Status: COMPLETED | OUTPATIENT
Start: 2018-12-26 | End: 2018-12-26

## 2018-12-26 RX ADMIN — CEPHALEXIN 500 MG: 500 CAPSULE ORAL at 15:29

## 2018-12-26 ASSESSMENT — ENCOUNTER SYMPTOMS
DYSURIA: 1
DIARRHEA: 0
FEVER: 0
NAUSEA: 0
DIFFICULTY URINATING: 1
CONSTIPATION: 1
VOMITING: 0

## 2018-12-26 ASSESSMENT — MIFFLIN-ST. JEOR: SCORE: 1227.15

## 2018-12-26 NOTE — ED PROVIDER NOTES
History     Chief Complaint:  Dysuria    The history is provided by the patient and the spouse.      Angeli Jacobson is an anticoagulated 68 year old female on Xarelto with a history of Myxoid Liposarcoma, hyperlipidemia and recurrent UTI who presents to the emergency department with her  for evaluation of dysuria. Of note, the patient has a history of recurrent UTI, which she states generally improves with Bactrim at home. Most recently on 11/30, she was seen at the [facility] where a urinalysis showed signs of infection, culture results below. She was again seen in the [facility] on 12/13 where urinalysis at that point did not show any signs of infection. On 12/19, the patient was admitted to the hospital for a left lower extremity angiography with thrombolysis of left superficial femoral artery PTFE interposition graft and placement of new 6 mm Viabahn covered stent. The patient was subsequently given a Bneitez catheter and had it removed 12/22 on discharge. On 12/24, the patient noted the onset of dysuria typical of her normal UTI, prompting her to begin taking doses of her Bactrim, once in the morning and once in the afternoon. She recalls taking the same morning and afternoon doses for the past 3 days since the onset of symptoms with on resolution of her symptoms, prompting her to seek further evaluation here in the ED. Here, the patient complains of the dysuria, urgency and incomplete voiding. She additionally complains of constipation and some mild increased confusion that she thinks may be related to her recent surgery and hospital admission. She denies any co-occurrence of vaginal bleeding or discharge as well as denying any nausea, vomiting, diarrhea or fevers. Of note, she has not taken her dose of Xarelto today.     [facility], 11/30/18: Urine Culture  >100,000 colonies/mL Streptococcus Agalactiae Sero Group B     Allergies:  Celexa    Medications:   "  Alendronate  Aspirin 81 mg  Bupropion  Cyclobenzaprine  Ezetimibe  Fluticasone  Xarelto  Rosuvastatin     Past Medical History:    Central Serous Retinopathy  Chronic Neck Pain  Depression  Hyperlipidemia  Myxoid Liposarcoma  Multinodular Goiter  Osteoporosis  Lymphedema  Peripheral Artery Disease  Shingles  Vertigo    Past Surgical History:    Appendectomy  Left thigh Liposarcoma Biopsy  Colonoscopy  D&C   Vascular Surgery  Left Lower Angiogram    Family History:    CAD: Father ( MI: Age 57), Paternal Grandmother, Paternal Uncle  Colon Cancer  Pancreatic Cancer  Diabetes  Alcohol Abuse  Obesity  Osteoporosis    Social History:  Marital Status:   [2]  Tobacco Use: No  Alcohol Use: No    Review of Systems   Constitutional: Negative for fever.   Gastrointestinal: Positive for constipation. Negative for diarrhea, nausea and vomiting.   Genitourinary: Positive for difficulty urinating, dysuria and urgency. Negative for vaginal bleeding and vaginal discharge.   All other systems reviewed and are negative.    Physical Exam     Patient Vitals for the past 24 hrs:   BP Temp Temp src Pulse Heart Rate Resp SpO2 Height Weight   18 1551 135/87 97.6  F (36.4  C) Oral -- 75 18 97 % -- --   18 1359 162/67 98.1  F (36.7  C) Oral 88 88 18 98 % 1.676 m (5' 6\") 68 kg (150 lb)       Physical Exam  General: Alert and interactive. Appears slightly anxious. Cooperative and pleasant.   Eyes: The pupils are equal and round. EOMs intact. No scleral icterus.  ENT: No abnormalities to the external nose or ears. Mucous membranes moist. Posterior oropharynx is non-erythematous.  Neck: Trachea is in the midline. No nuchal rigidity.     CV: Regular rate and rhythm. S1 and S2 normal without murmur, click, gallop or rub.   Resp: Breath sounds are clear bilaterally, without rhonchi, wheezes, rales. Non-labored, no retractions or accessory muscle use.     GI: Abdomen is soft without distension. No tenderness to palpation. No " peritoneal signs. No CVA tenderness bilaterally.   MS: Moving all extremities well. Good muscle tone.   Skin: Warm and dry. Well healing incision wound in right groin.   Neuro: Alert and oriented x 3. Patient is not acutely confused, although does have some difficulty with history. No focal neurologic deficits. Good strength and sensation in upper and lower extremities.    Psych: Awake. Alert.  Normal affect. Appropriate interactions.  Lymph: No anterior or posterior cervical lymphadenopathy noted.    Emergency Department Course     Laboratory:  UA with micro: Blood: Moderate, Protein Albumin: 10, Leukocyte Esterase: Large, WBC: >182 (H), RBC: 28 (H) o/w negative  Urine Culture: Pending    Interventions:  1529 Keflex 500 mg, PO    Emergency Department Course:  1453 Nursing notes and vitals reviewed. I performed an exam of the patient as documented above.     Medicine administered as documented above.     The patient provided a urine sample here in the emergency department. This was sent for laboratory testing, findings above.     1538 I rechecked the patient and discussed the results of her workup thus far.     Findings and plan explained to the patient and spouse. Patient discharged home with instructions regarding supportive care, medications, and reasons to return. The importance of close follow-up was reviewed.    I personally reviewed the laboratory results with the patient and spouse and answered all related questions prior to discharge.       Impression & Plan      Medical Decision Making:  Angeli Jacobson is a 68 year old female who presents for evaluation of dysuria, urgency and incomplete voiding. This clinically is consistent with a urinary tract infection.  Urinalysis confirms the infection with >182 white cells and large leukocyte esterase. This would be considered a complicated UTI, as the patient is not improving with Bactrim at home, and was recently in the hospital from 12/19-12/22 with  catheter in place prior to onset of symptoms. expressed some concerns about her acute confusion, which I thought could be multifactorial with the recent hospitalization. However, I am not concerned for urosepsis, as there has been no fever, back/flank pain, or significant abdominal pain. Vitals have remained normal. There is no clinical evidence of pyelonephritis, appendicitis, colitis, diverticulitis or any intraabdominal catastrophe. The patient will be started on Keflex for the infection. Initial dose given here in the Emergency Department. Return if increasing pain, vomiting, fever, or inability to tolerate the oral antibiotic. Follow up with primary physician is indicated in 2-3 days to ensure that the patient is improving. Urine culture pending. Return here for increase in confusion, fever, chills, abdominal pain, nausea/vomiting, or other concerns.     Diagnosis:    ICD-10-CM    1. Acute cystitis with hematuria N30.01 Urine Culture   2. Urinary tract infection associated with indwelling urethral catheter, initial encounter (H) T83.511A     N39.0     S/p hospital stay with indwelling thompson       Disposition:  discharged to home    Discharge Medications:     Medication List      Started    cephALEXin 500 MG capsule  Commonly known as:  KEFLEX  500 mg, Oral, 4 TIMES DAILY          Scribe Disclosure:  I, Frankie Nuñez, am serving as a scribe on 12/26/2018 at 2:53 PM to personally document services performed by Maxine Enriquez PA based on my observations and the provider's statements to me.     Frankie Nuñez  12/26/2018    EMERGENCY DEPARTMENT       Maxine Enriquez PA-C  12/27/18 0144

## 2018-12-26 NOTE — ED AVS SNAPSHOT
Emergency Department  [address] [place] [id number]  Phone:  [phone]  Fax:  [phone]                                    Angeli Jacobson   MRN: [id number]    Department:   Emergency Department   Date of Visit:  12/26/2018           After Visit Summary Signature Page    I have received my discharge instructions, and my questions have been answered. I have discussed any challenges I see with this plan with the nurse or doctor.    ..........................................................................................................................................  Patient/Patient Representative Signature      ..........................................................................................................................................  Patient Representative Print Name and Relationship to Patient    ..................................................               ................................................  Date                                   Time    ..........................................................................................................................................  Reviewed by Signature/Title    ...................................................              ..............................................  Date                                               Time          22EPIC Rev 08/18

## 2018-12-26 NOTE — TELEPHONE ENCOUNTER
Patient believes she has a UTI. Hx of UTIs in the past and similar symptoms. She states she did recently have an angiogram at [facility] in her left thigh, she did have a thompson catheter while hospitalized, and was discharged from the hospital on 12/22/18. She thinks she has had some abdominal bloating for the past couple of days, which she said she does get with UTI in the past, but she originally thought it might be residual left over from her recent procedure. Then when her other symptoms started today she realized she likely has a UTI. She notes frequency and urgency to urinate but then when se sits down to try to urinate only a few drops come out. She states the urgency to go is still there but that she just cannot urinate after those couple of drops come out. She also notes she was just diagnosed last week with a prolapsed bladder.    Protocol and care advice reviewed.  Recommendation per protocol is to go the the ER, patient will have someone drive her to the [facility] ED.   Caller states understanding of the recommended disposition.   Advised to call back if further questions or concerns.    Reason for Disposition    [1] Unable to urinate (or only a few drops) > 4 hours AND     [2] bladder feels very full (e.g., palpable bladder or strong urge to urinate)    Additional Information    Negative: Shock suspected (e.g., cold/pale/clammy skin, too weak to stand, low BP, rapid pulse)    Negative: Sounds like a life-threatening emergency to the triager    Protocols used: URINARY SYMPTOMS-ADULT-

## 2018-12-26 NOTE — DISCHARGE INSTRUCTIONS
Discharge Instructions  Urinary Tract Infection  You or your child have been diagnosed with a urinary tract infection, or UTI. The urinary tract includes the kidneys (which make urine/pee), ureters (the tubes that carry urine/pee from the kidneys to the bladder), the bladder (which stores urine/pee), and urethra (the tube that carries urine/pee out of the bladder). Urinary tract infections occur when bacteria travel up the urethra into the bladder (bladder infection) and, in some cases, from there into the kidneys (kidney infection).  Generally, every Emergency Department visit should have a follow-up clinic visit with either a primary or a specialty clinic/provider. Please follow-up as instructed by your emergency provider today.  Return to the Emergency Department if:  You or your child have severe back pain.  You or your child are vomiting (throwing up) so that you cannot take your medicine.  You or your child have a new fever (had not previously had a fever) over 101 F.  You or your child have confusion or are very weak, or feel very ill.  Your child seems much more ill, will not wake up, will not respond right, or is crying for a long time and will not calm down.  You or your child are showing signs of dehydration. These signs may include decreased urination (pee), dry mouth/gums/tongue, or decreased activity.    Follow-up with your provider:   Children under 24 months need to be seen by their regular provider within one week after a diagnosis of a UTI. It may be necessary to do some more tests to look at the child?s kidney or bladder.  You should begin to feel better within 24 - 48 hours of starting your antibiotic; follow-up with your regular clinic/doctor/provider if this is not the case.    Treatment:   You will be treated with an antibiotic to kill the bacteria. We have to make an educated guess, based on what we know about common bacteria and antibiotics, as to which antibiotic will work for your  "infection. We will be correct most times but there will be some cases where the antibiotic chosen is not correct (see urine cultures below).  Take a pain medication such as acetaminophen (Tylenol ) or ibuprofen (Advil , Motrin , Nuprin ).  Phenazopyridine (Pyridium , Uristat ) is a prescription medication that numbs the bladder to reduce the burning pain of some UTIs.  The same medication is available in a non-prescription version (Azo-Standard , Urodol ). This medication will change the color of the urine and tears (usually blue or orange). If you wear contacts, do not wear them while taking this medication as they may be stained by the medication.    Urine Cultures:  If indicated, a urine culture may have been performed today. This test generally takes 24-48 hours to complete so the results are not known at this time. The results can confirm that an infection is present but also determine which antibiotic is effective for the specific bacteria that is causing the infection. If your urine culture shows that the antibiotic you were given today will not work to treat your infection, we will attempt to contact you to make arrangements to change the antibiotic. If the culture confirms that the antibiotic is effective for your infection, you will not be contacted. We often recommend follow-up with your regular physician/provider on the culture results regardless of this process.    Antibiotic Warning:   If you have been placed on antibiotics - watch for signs of allergic reaction.  These include rash, lip swelling, difficulty breathing, wheezing, and dizziness.  If you develop any of these symptoms, stop the antibiotic immediately and go to an emergency room or urgent care for evaluation.    Probiotics: If you have been given an antibiotic, you may want to also take a probiotic pill or eat yogurt with live cultures. Probiotics have \"good bacteria\" to help your intestines stay healthy. Studies have shown that probiotics " help prevent diarrhea and other intestine problems (including C. diff infection) when you take antibiotics. You can buy these without a prescription in the pharmacy section of the store.   If you were given a prescription for medicine here today, be sure to read all of the information (including the package insert) that comes with your prescription.  This will include important information about the medicine, its side effects, and any warnings that you need to know about.  The pharmacist who fills the prescription can provide more information and answer questions you may have about the medicine.  If you have questions or concerns that the pharmacist cannot address, please call or return to the Emergency Department.   Remember that you can always come back to the Emergency Department if you are not able to see your regular provider in the amount of time listed above, if you get any new symptoms, or if there is anything that worries you.

## 2018-12-27 NOTE — TELEPHONE ENCOUNTER
Patient calling and states was told to do E-Visit and she did that and got to end and it said for non-emergent issues and she feels this is more than non-emergent.  Advised of [employer]'s message.  Is feeling fine with the medicine she started.  Only opening tomorrow is 1 pm same day hold.  Took that appt as only available.  Reyna Luciano RN

## 2018-12-27 NOTE — TELEPHONE ENCOUNTER
Sepideh- see [employer] message below.  Please advise. Reyna Luciano RN    12/26/18  Impression & Plan       Medical Decision Making:  Angeli Jacobson is a 68 year old female who presents for evaluation of dysuria, urgency and incomplete voiding. This clinically is consistent with a urinary tract infection.  Urinalysis confirms the infection with >182 white cells and large leukocyte esterase. This would be considered a complicated UTI, as the patient is not improving with Bactrim at home, and was recently in the hospital from 12/19-12/22 with catheter in place prior to onset of symptoms. expressed some concerns about her acute confusion, which I thought could be multifactorial with the recent hospitalization. However, I am not concerned for urosepsis, as there has been no fever, back/flank pain, or significant abdominal pain. Vitals have remained normal. There is no clinical evidence of pyelonephritis, appendicitis, colitis, diverticulitis or any intraabdominal catastrophe. The patient will be started on Keflex for the infection. Initial dose given here in the Emergency Department. Return if increasing pain, vomiting, fever, or inability to tolerate the oral antibiotic. Follow up with primary physician is indicated in 2-3 days to ensure that the patient is improving. Urine culture pending. Return here for increase in confusion, fever, chills, abdominal pain, nausea/vomiting, or other concerns.

## 2018-12-27 NOTE — TELEPHONE ENCOUNTER
Please call and see how pt is feeling. Not sure I have any openings today, but I do tomorrow.   As long as she's feeling okay today, then tomorrow would be fine for f/u.     If not, double book at 3 pm today.

## 2018-12-28 ENCOUNTER — OFFICE VISIT (OUTPATIENT)
Dept: FAMILY MEDICINE | Facility: CLINIC | Age: 68
End: 2018-12-28
Payer: COMMERCIAL

## 2018-12-28 VITALS
WEIGHT: 152 LBS | TEMPERATURE: 98.3 F | BODY MASS INDEX: 24.53 KG/M2 | DIASTOLIC BLOOD PRESSURE: 81 MMHG | OXYGEN SATURATION: 96 % | HEART RATE: 82 BPM | SYSTOLIC BLOOD PRESSURE: 119 MMHG | RESPIRATION RATE: 16 BRPM

## 2018-12-28 DIAGNOSIS — T82.898S OCCLUSION OF LEFT FEMOROPOPLITEAL BYPASS GRAFT, SEQUELA: ICD-10-CM

## 2018-12-28 DIAGNOSIS — E78.5 HYPERLIPIDEMIA LDL GOAL <70: ICD-10-CM

## 2018-12-28 DIAGNOSIS — N30.01 ACUTE CYSTITIS WITH HEMATURIA: Primary | ICD-10-CM

## 2018-12-28 PROCEDURE — 99214 OFFICE O/P EST MOD 30 MIN: CPT | Performed by: PHYSICIAN ASSISTANT

## 2018-12-28 RX ORDER — EZETIMIBE 10 MG/1
10 TABLET ORAL DAILY
Qty: 90 TABLET | Refills: 1 | Status: SHIPPED | OUTPATIENT
Start: 2018-12-28 | End: 2019-03-20

## 2018-12-28 NOTE — RESULT ENCOUNTER NOTE
Await final culture report per [facility] ED Lab Result protocol.  RN confirmed Patient was prescribed antibiotic from ED visit.

## 2018-12-28 NOTE — PROGRESS NOTES
SUBJECTIVE:   Angeli Jacobson is a 68 year old female who presents to clinic today for the following health issues:      ED/UC Followup: Acute Cystitis with Hematuria, UTI    Facility:  [facility]  Date of visit: 12/26/2018  Reason for visit: UTI sx's  Current Status: Feeling better, but still having urgency, but does produce urine when this occurs.  Still taking Keflex  UC shows positive sensitivity for Keflex             Hospital Follow-up Visit:    Hospital/Nursing Home/IP Rehab Facility: [facility]  Date of Admission: 12/19/18  Date of Discharge: 12/21/18  Reason(s) for Admission: 1.  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   2.  Chronic left leg lymphedema.             Problems taking medications regularly:  None       Medication changes since discharge: None       Problems adhering to non-medication therapy:  None    Summary of hospitalization:  [facility] discharge summary reviewed  Diagnostic Tests/Treatments reviewed.  Follow up needed: vascular  Other Healthcare Providers Involved in Patient s Care:         vascular f/u   Update since discharge: improved.     Post Discharge Medication Reconciliation: discharge medications reconciled, continue medications without change.  Plan of care communicated with patient and family     Coding guidelines for this visit:  Type of Medical   Decision Making Face-to-Face Visit       within 7 Days of discharge Face-to-Face Visit        within 14 days of discharge   Moderate Complexity [id number]   High Complexity [id number]              Problem list and histories reviewed & adjusted, as indicated.  Additional history: as documented    Patient Active Problem List   Diagnosis     Pain in joint, multiple sites     MULTINODUL GOITER     Hearing loss     Hyperlipidemia LDL goal <70     Osteoporosis     Cervicalgia     Major depressive disorder, recurrent episode, moderate (H)     Impaired fasting glucose      Lymphedema of left leg     Tubular adenoma     Other constipation     Vertigo     Myxoid liposarcoma (HCC)     PAD (peripheral artery disease) (H)     Vascular graft occlusion (H)     Femoral-popliteal bypass graft occlusion, left (H)     Past Surgical History:   Procedure Laterality Date     C APPENDECTOMY      Appendectomy     C NONSPECIFIC PROCEDURE  2000    Open Biopsy Left Thigh Liposarcoma     COLONOSCOPY N/A 2016    Procedure: COMBINED COLONOSCOPY, SINGLE OR MULTIPLE BIOPSY/POLYPECTOMY BY BIOPSY;  Surgeon: Varun Stanley MD, MD;  Location: [facility]     EXCISION MALIG LESION>1.25CM  2000    Myxoid Liposarcoma       EXPLORE GROIN Right 2018    Procedure: EXPLORE GROIN;  EMERGENCY RIGHT FEMORAL EXPLORATION WITH FEMORAL ARTERY REPAIR.    EBL: 50mL;  Surgeon: Shade Owens MD;  Location: [facility] OR     HC COLONOSCOPY THRU STOMA, DIAGNOSTIC      due      HC COLP CERVIX/UPPER VAGINA  1997    Negative     HC DILATION/CURETTAGE DIAG/THER NON OB  1997    Post menopausal bleeding on HRT, negative     IR ANGIOGRAM THROUGH CATHETER FOLLOW UP  2018     IR ANGIOGRAM THROUGH CATHETER FOLLOW UP  2018     IR LOWER EXTREMITY ANGIOGRAM LEFT  2018     VASCULAR SURGERY  2018    Left SFA stent in bypass graft       Social History     Tobacco Use     Smoking status: Never Smoker     Smokeless tobacco: Never Used   Substance Use Topics     Alcohol use: No     Family History   Problem Relation Age of Onset     C.A.D. Father         MI 57     Alcohol/Drug Father         etoh     Obesity Mother      Osteoporosis Mother      Colon Cancer Brother 70     Hyperlipidemia Son      Hyperlipidemia Son         very high, experimental drug     C.A.D. Paternal Grandmother         ascvd     Diabetes Maternal Grandmother      Cancer Maternal Grandmother      C.A.D. Paternal Uncle         Mi  age 48     Cancer Maternal Aunt         pancreatic CA           Reviewed and updated as needed this  visit by clinical staff  Tobacco  Allergies  Meds  Med Hx  Surg Hx  Fam Hx  Soc Hx      Reviewed and updated as needed this visit by Provider         ROS:  Constitutional, HEENT, cardiovascular, pulmonary, gi and gu systems are negative, except as otherwise noted.    OBJECTIVE:     /81 (BP Location: Right arm, Patient Position: Chair, Cuff Size: Adult Large)   Pulse 82   Temp 98.3  F (36.8  C) (Oral)   Resp 16   Wt 68.9 kg (152 lb)   LMP 03/18/2005   SpO2 96%   BMI 24.53 kg/m    Body mass index is 24.53 kg/m .  GENERAL APPEARANCE: healthy, alert and no distress  RESP: lungs clear to auscultation - no rales, rhonchi or wheezes  CV: regular rates and rhythm, normal S1 S2, no S3 or S4 and no murmur, click or rub  MS: ecchymosis right groin, pulses 2+ femoral bilateral  PSYCH: mentation appears normal and affect normal/bright        ASSESSMENT/PLAN:             1. Acute cystitis with hematuria  Repeat urine in 10 days. Fluids and rest.   Continue keflex  - *UA reflex to Microscopic and Culture ([place] [facility] (except [place] and [place]); Future    2. Hyperlipidemia LDL goal <70  Refilled.   - ezetimibe (ZETIA) 10 MG tablet; Take 1 tablet (10 mg) by mouth daily  Dispense: 90 tablet; Refill: 1    3. Occlusion of left femoropopliteal bypass graft, sequela  F/u with vascular in 7-10 days.           Sepideh Burris PA-C  [facility]

## 2018-12-29 LAB
BACTERIA SPEC CULT: ABNORMAL
BACTERIA SPEC CULT: ABNORMAL
Lab: ABNORMAL
SPECIMEN SOURCE: ABNORMAL

## 2018-12-30 ENCOUNTER — TELEPHONE (OUTPATIENT)
Dept: EMERGENCY MEDICINE | Facility: CLINIC | Age: 68
End: 2018-12-30

## 2018-12-30 NOTE — TELEPHONE ENCOUNTER
[facility] Emergency Department Lab result notification [Adult-Female]    [place] ED lab result protocol used  Urine culture    Reason for call  Notify of lab results, assess symptoms,  review ED providers recommendations/discharge instructions (if necessary) and advise per ED lab result f/u protocol    Lab Result (including Rx patient on, if applicable)  Final Urine Culture Report on 12/29/18  Emergency Dept/Urgent Care discharge antibiotic prescribed: Cephalexin (Keflex) 500 mg capsule, 1 capsule (500 mg) by mouth 4 times daily for 14 days.  #1. Bacteria, >100,000 colonies/mL Enterococcus faecalis,  is [NOT TESTED] to antibiotic.   #2. Bacteria, 10,000 to 50,000 colonies/ml Escherichia coli,  is [ SUSCEPTIBLE] to antibiotic.   Change in treatment as per [place] ED Lab result protocol.  Information table from ED Provider visit on 12/26/18  Symptoms reported at ED visit (Chief complaint, HPI) Dysuria     The history is provided by the patient and the spouse.      Angeli Jacobson is an anticoagulated 68 year old female on Xarelto with a history of Myxoid Liposarcoma, hyperlipidemia and recurrent UTI who presents to the emergency department with her  for evaluation of dysuria. Of note, the patient has a history of recurrent UTI, which she states generally improves with Bactrim at home. Most recently on 11/30, she was seen at the [facility] where a urinalysis showed signs of infection, culture results below. She was again seen in the [facility] on 12/13 where urinalysis at that point did not show any signs of infection. On 12/19, the patient was admitted to the hospital for a left lower extremity angiography with thrombolysis of left superficial femoral artery PTFE interposition graft and placement of new 6 mm Viabahn covered stent. The patient was subsequently given a Benitez catheter and had it removed 12/22 on discharge. On 12/24, the patient noted the onset of dysuria  "typical of her normal UTI, prompting her to begin taking doses of her Bactrim, once in the morning and once in the afternoon. She recalls taking the same morning and afternoon doses for the past 3 days since the onset of symptoms with on resolution of her symptoms, prompting her to seek further evaluation here in the ED. Here, the patient complains of the dysuria, urgency and incomplete voiding. She additionally complains of constipation and some mild increased confusion that she thinks may be related to her recent surgery and hospital admission. She denies any co-occurrence of vaginal bleeding or discharge as well as denying any nausea, vomiting, diarrhea or fevers. Of note, she has not taken her dose of Xarelto today.    Significant Medical hx, if applicable (i.e. CKD, diabetes) Recent hospital admit \"Thrombosis of proximal left superficial femoral artery PTFE interposition graft. \"   Allergies Allergies   Allergen Reactions     Celexa [Citalopram Hydrobromide]      Decreased libido      Weight, if applicable Wt Readings from Last 2 Encounters:   12/28/18 68.9 kg (152 lb)   12/26/18 68 kg (150 lb)      Coumadin/Warfarin [Yes /No] No   Creatinine Level (mg/dl) Creatinine   Date Value Ref Range Status   12/22/2018 0.70 0.52 - 1.04 mg/dL Final      Creatinine clearance (ml/min), if applicable Serum creatinine: 0.7 mg/dL 12/22/18 0530  Estimated creatinine clearance: 83.7 mL/min   ED providers Impression and Plan (applicable information) Angeli Jacobson is a 68 year old female who presents for evaluation of dysuria, urgency and incomplete voiding. This clinically is consistent with a urinary tract infection.  Urinalysis confirms the infection with >182 white cells and large leukocyte esterase. This would be considered a complicated UTI, as the patient is not improving with Bactrim at home, and was recently in the hospital from 12/19-12/22 with catheter in place prior to onset of symptoms. expressed some " "concerns about her acute confusion, which I thought could be multifactorial with the recent hospitalization. However, I am not concerned for urosepsis, as there has been no fever, back/flank pain, or significant abdominal pain. Vitals have remained normal. There is no clinical evidence of pyelonephritis, appendicitis, colitis, diverticulitis or any intraabdominal catastrophe. The patient will be started on Keflex for the infection. Initial dose given here in the Emergency Department. Return if increasing pain, vomiting, fever, or inability to tolerate the oral antibiotic. Follow up with primary physician is indicated in 2-3 days to ensure that the patient is improving. Urine culture pending. Return here for increase in confusion, fever, chills, abdominal pain, nausea/vomiting, or other concerns.    ED diagnosis 1. Acute cystitis with hematuria N30.01 Urine Culture   2. Urinary tract infection associated with indwelling urethral catheter, initial encounter (H) T83.511A       N39.0       S/p hospital stay with indwelling thompson        ED provider Maxine Enriquez PA- RN Assessment (Patient s current Symptoms), include time called.  [Insert Left message here if message left]  1:33PM: Spoke with Patient. She states she is feeling much better. Denies any pain with urination, frequency, fever, back pain, abdominal pain. Has some occasional urgency \"but I think I've just waited too long to go to the bathroom.\" Her slight confusion from a few days ago is much better.     RN Recommendations/Instructions per [facility] ED lab result protocol  Patient notified of lab result and treatment recommendations.    Patient Education on preventing future UTI's.  1. Practice good personal hygiene. Wipe yourself from front to back after using the toilet. This helps keep bacteria from getting into the urethra. Keep the genital area clean and dry.  2. Drink plenty of fluids, such as water, juice, or other caffeine-free drinks.  " Drink at least 6-8 glasses a day (1 1/2 to 2 1/2 liters), unless you must restrict fluids for other medical reasons. This will force the medicine (antibiotic) into your urinary system and flush the bacteria out of your body. Cranberry juice has been shown to help clear out the bacteria.  3. Empty your bladder. Always empty your bladder when you feel the urge to urinate. And always urinate before going to sleep. Urine that stays in your bladder can lead to infection. Try to urinate before and after sex as well.  4. Wear cotton underwear and cotton-lined panty hose; avoid tight-fitting pants.  5. Follow up with your health care provider as directed. He or she may test to make sure the infection has cleared. If necessary, additional treatment may be started.    [facility] Provider Name & Recommendations (included time consulted)  1:42PM: Consulted with Dr. Dayton Oh at the [facility] ED. Reviewed Patient's history and current status. Dr. Oh advised that the Patient continue with the current antibiotic treatment. Follow up with PCP when treatment completed.      Please Contact your PCP clinic or return to the Emergency department if your:    Symptoms worsen or other concerning symptom's.    PCP follow-up Questions asked: YES       [RN Name]  Osiris Franks RN  [facility] RN  Lung Nodule and ED Lab Result RN  Epic pool (ED late result f/u RN): [id number]  [facility] INCIDENTAL RADIOLOGY F/U NURSES: P [id number]  # [phone]    Copy of Lab result   Urine Culture [[id number]] (Order [id number])   Exam Information     Exam Date Exam Time Accession # Results    12/26/18  2:51 PM [id number]    Component Results     Specimen Information: Urine clean catch; Midstream Urine        Component Collected Lab   Specimen Description 12/26/2018  2:51    Midstream Urine    Special Requests 12/26/2018  2:51 PM 75   Specimen received in preservative    Culture Micro (Abnormal) 12/26/2018  2:51     Abnormal   >100,000 colonies/mL   Enterococcus faecalis     Culture Micro (Abnormal) 12/26/2018  2:51    Abnormal   10,000 to 50,000 colonies/mL   Escherichia coli     Susceptibility     Escherichia coli (2)     Antibiotic Interpretation Sensitivity Method Status   AMPICILLIN Resistant >=32 ug/mL CEE Final   CEFAZOLIN Sensitive <=4 ug/mL CEE Final    Cefazolin CEE breakpoints are for the treatment of uncomplicated urinary tract   infections.  For the treatment of systemic infections, please contact the   laboratory for additional testing.   CEFOXITIN Sensitive <=4 ug/mL CEE Final   CEFTAZIDIME Sensitive <=1 ug/mL CEE Final   CEFTRIAXONE Sensitive <=1 ug/mL CEE Final   CIPROFLOXACIN Sensitive <=0.25 ug/mL CEE Final   GENTAMICIN Sensitive <=1 ug/mL CEE Final   LEVOFLOXACIN Sensitive <=0.12 ug/mL CEE Final   NITROFURANTOIN Sensitive <=16 ug/mL CEE Final   TOBRAMYCIN Sensitive <=1 ug/mL CEE Final   Trimethoprim/Sulfa Resistant >=16/304 ug/mL CEE Final   AMPICILLIN/SULBACTAM Sensitive 8 ug/mL CEE Final   Piperacillin/Tazo Sensitive <=4 ug/mL CEE Final   CEFEPIME Sensitive <=1 ug/mL CEE Final   Enterococcus faecalis (1)     Antibiotic Interpretation Sensitivity Method Status   AMPICILLIN Sensitive <=2 ug/mL CEE Final   NITROFURANTOIN Sensitive <=16 ug/mL CEE Final   PENICILLIN Sensitive 2 ug/mL CEE Final   VANCOMYCIN Sensitive 2 ug/mL CEE Final

## 2018-12-30 NOTE — TELEPHONE ENCOUNTER
2:12PM: Called Patient back. Advised per ED provider's recommendation as noted below.  Patient is comfortable with this plan. Has no further questions at this time.    Osiris Franks RN  [facility] RN  Lung Nodule and ED Lab Result RN  Epic pool (ED late result f/u RN): [id number]  [facility] INCIDENTAL RADIOLOGY F/U NURSES: P [id number]  # [phone]

## 2019-01-03 NOTE — PROGRESS NOTES
SUBJECTIVE:                                                   Angeli Jacobson is a 68 year old female who presents to clinic today for the following health issue(s):  Patient presents with:  Ultrasound      HPI:  No changes since last visit.    Has not gone to PT  Has started using metamucil, feels BMs are better.     Has some urinary leakage if waits too longs.    Had angiogram just before [place].     Accompanied by .    Patient's last menstrual period was 2005..   Patient is sexually active, .  Using menopause for contraception.    reports that  has never smoked. she has never used smokeless tobacco.    STD testing offered?  Declined    Health maintenance updated:  yes    Today's PHQ-2 Score:   PHQ-2 (  [employer]) 2018   Q1: Little interest or pleasure in doing things 0   Q2: Feeling down, depressed or hopeless 0   PHQ-2 Score 0   Q1: Little interest or pleasure in doing things Not at all   Q2: Feeling down, depressed or hopeless Not at all   PHQ-2 Score 0     Today's PHQ-9 Score:   PHQ-9 SCORE 2018   PHQ-9 Total Score -   PHQ-9 Total Score MyChart -   PHQ-9 Total Score 0     Today's CYNDI-7 Score:   CYNDI-7 SCORE 2018   Total Score 0       Problem list and histories reviewed & adjusted, as indicated.  Additional history: as documented.    Patient Active Problem List   Diagnosis     Pain in joint, multiple sites     MULTINODUL GOITER     Hearing loss     Hyperlipidemia LDL goal <70     Osteoporosis     Cervicalgia     Major depressive disorder, recurrent episode, moderate (H)     Impaired fasting glucose     Lymphedema of left leg     Tubular adenoma     Other constipation     Vertigo     Myxoid liposarcoma (HCC)     PAD (peripheral artery disease) (H)     Vascular graft occlusion (H)     Femoral-popliteal bypass graft occlusion, left (H)     Past Surgical History:   Procedure Laterality Date     C APPENDECTOMY      Appendectomy     C NONSPECIFIC PROCEDURE  2000     Open Biopsy Left Thigh Liposarcoma     COLONOSCOPY N/A 2016    Procedure: COMBINED COLONOSCOPY, SINGLE OR MULTIPLE BIOPSY/POLYPECTOMY BY BIOPSY;  Surgeon: Varun Stanley MD, MD;  Location: [facility]     EXCISION MALIG LESION>1.25CM  2000    Myxoid Liposarcoma       EXPLORE GROIN Right 2018    Procedure: EXPLORE GROIN;  EMERGENCY RIGHT FEMORAL EXPLORATION WITH FEMORAL ARTERY REPAIR.    EBL: 50mL;  Surgeon: Shade Owens MD;  Location: [facility]     HC COLONOSCOPY THRU STOMA, DIAGNOSTIC      due      HC COLP CERVIX/UPPER VAGINA  1997    Negative     HC DILATION/CURETTAGE DIAG/THER NON OB  1997    Post menopausal bleeding on HRT, negative     IR ANGIOGRAM THROUGH CATHETER FOLLOW UP  2018     IR ANGIOGRAM THROUGH CATHETER FOLLOW UP  2018     IR LOWER EXTREMITY ANGIOGRAM LEFT  2018     VASCULAR SURGERY  2018    Left SFA stent in bypass graft      Social History     Tobacco Use     Smoking status: Never Smoker     Smokeless tobacco: Never Used   Substance Use Topics     Alcohol use: No      Problem (# of Occurrences) Relation (Name,Age of Onset)    Alcohol/Drug (1) Father: etoh    C.A.D. (3) Father: MI 57, Paternal Grandmother: ascvd, Paternal Uncle: Mi  age 48    Cancer (2) Maternal Grandmother, Maternal Aunt: pancreatic CA    Colon Cancer (1) Brother (70)    Diabetes (1) Maternal Grandmother    Hyperlipidemia (2) Son, Son: very high, experimental drug    Obesity (1) Mother    Osteoporosis (1) Mother            Current Outpatient Medications   Medication Sig     alendronate (FOSAMAX) 70 MG tablet Take 1 tablet (70 mg) by mouth with 8oz water every 7 days 30 minutes before breakfast and remain upright during this time.     aspirin 81 MG EC tablet Take 81 mg by mouth daily     buPROPion (WELLBUTRIN XL) 300 MG 24 hr tablet Take 1 tablet (300 mg) by mouth every morning     cephALEXin (KEFLEX) 500 MG capsule Take 1 capsule (500 mg) by mouth 4 times daily for 14 days      ezetimibe (ZETIA) 10 MG tablet Take 1 tablet (10 mg) by mouth daily     fluticasone (FLONASE) 50 MCG/ACT spray Spray 1-2 sprays into both nostrils daily (Patient taking differently: Spray 1-2 sprays into both nostrils daily as needed )     MULTI-VITAMIN OR TABS 1 TABLET DAILY     order for DME Equipment being ordered: Left Thigh High Compression Stocking, 550 CCL.3     ORDER FOR DME Equipment being ordered: lymphedema bandages     rivaroxaban ANTICOAGULANT (XARELTO) 15 MG TABS tablet Take 1 tablet (15 mg) by mouth 2 times daily (with meals) for 21 days     [START ON 1/12/2019] rivaroxaban ANTICOAGULANT (XARELTO) 20 MG TABS tablet Take 1 tablet (20 mg) by mouth daily (with dinner) Start after completing 21 days of 15 mg PO BID.     rosuvastatin (CRESTOR) 40 MG tablet Take 1 tablet (40 mg) by mouth At Bedtime     No current facility-administered medications for this visit.      Allergies   Allergen Reactions     Celexa [Citalopram Hydrobromide]      Decreased libido       ROS:  12 point review of systems negative other than symptoms noted below.    OBJECTIVE:     /80   Wt 70 kg (154 lb 6.4 oz)   LMP 03/18/2005   Breastfeeding? No   BMI 24.92 kg/m    Body mass index is 24.92 kg/m .    Exam:  Constitutional:  Appearance: Well nourished, well developed alert, in no acute distress  Chest:  Respiratory Effort:  Breathing unlabored  Neurologic/Psychiatric:  Mental Status:  Oriented X3      In-Clinic Test Results:  Results for orders placed or performed in visit on 01/04/19 (from the past 24 hour(s))   US Transvaginal Non OB    Narrative    Gynecological Ultrasound Report  Pelvic U/S - Transvaginal  [facility]      Referring Provider: Dr. Salcedo Masters  Sonographer:  Tequila Souza RDMS  Indication: Pelvic pressure  LMP: Patient's last menstrual period was 03/18/2005.  History: ovarian cysts    Gynecological Ultrasonography:   Uterus: anteverted  Size: 6.4 x 3.8 x 2.0cm  Endometrium:  Thickness total 3.7mm  Right Ovary: 1.9 x 1.2 x 0.9cm   Left Ovary: not visualized  Cul de Sac/Pouch of Lopez: no ff      Impression:   Normal right adnexa, Left ovary not visualized. No free fluid.  Normal uterus, endometrium 3.7mm.    Denisse Tubbs Masters, DO                     ASSESSMENT/PLAN:                                                        ICD-10-CM    1. Urinary urgency R39.15    2. Cystocele, midline N81.11        Patient Instructions     Patient Education     Kegel Exercises  Kegel exercises don t need special clothing or equipment. They re easy to learn and simple to do. And if you do them right, no one can tell you re doing them, so they can be done almost anywhere. Your healthcare provider, nurse, or physical therapist can answer any questions you have and help you get started.    A weak pelvic floor  The pelvic floor muscles may weaken due to aging, pregnancy and vaginal childbirth, injury, surgery, chronic cough, or lack of exercise. If the pelvic floor is weak, your bladder and other pelvic organs may sag out of place. The urethra may also open too easily and allow urine to leak out. Kegel exercises can help you strengthen your pelvic floor muscles. Then they can better support the pelvic organs and control urine flow.  How Kegel exercises are done  Try each of the Kegel exercises described below. When you re doing them, try not to move your leg, buttock, or stomach muscles:    Contract as if you were stopping your urine stream. But do it when you re not urinating.    Tighten your rectum as if trying not to pass gas. Contract your anus, but don t move your buttocks.    You may place a finger or 2 in the vagina and squeeze your finger with your vagina to learn which muscles to tighten.  Try to hold each Kegel for a slow count to 5. You probably won t be able to hold them for that long at first. But keep practicing. It will get easier as your pelvic floor gets stronger. Eventually, special  weights that you place in your vagina may be recommended to help make your Kegels even more effective. Visit your healthcare provider if you have difficulties doing Kegel exercises.  Helpful hints  Here are some tips to follow:    Do your Kegels as often as you can. The more you do them, the faster you ll feel the results.    Pick an activity you do often as a reminder. For instance, do your Kegels every time you sit down.    Tighten your pelvic floor before you sneeze, get up from a chair, cough, laugh, or lift. This protects your pelvic floor from injury and can help prevent urine leakage.   Date Last Reviewed: 10/1/2017    0655-0023 [employer]. [address] [place] [address], [place] [address]. All rights reserved. This information is not intended as a substitute for professional medical care. Always follow your healthcare professional's instructions.               -reviewed US findings with pt and , wnl. No concerns. No obvious pathology.  -Reviewed cystocele, and POP-symptoms, natural history, treatments available. Reassurance provided and this seems to appease pt at this time. She does plan to do pelvic PT, discussed expectations with this therapy. Can also priovide some benefit for urinary symptoms. Also discussed kegels and bladder training.   Pt may consider pessary again, will reeval prn after pelvic PT started.   Questions answered. Pt and  happy with plan.     Denisse Tubbs Masters, DO  [facility]

## 2019-01-04 ENCOUNTER — ANCILLARY PROCEDURE (OUTPATIENT)
Dept: ULTRASOUND IMAGING | Facility: CLINIC | Age: 69
End: 2019-01-04
Attending: OBSTETRICS & GYNECOLOGY
Payer: COMMERCIAL

## 2019-01-04 ENCOUNTER — OFFICE VISIT (OUTPATIENT)
Dept: OBGYN | Facility: CLINIC | Age: 69
End: 2019-01-04
Attending: OBSTETRICS & GYNECOLOGY
Payer: COMMERCIAL

## 2019-01-04 VITALS — DIASTOLIC BLOOD PRESSURE: 80 MMHG | SYSTOLIC BLOOD PRESSURE: 122 MMHG | WEIGHT: 154.4 LBS | BODY MASS INDEX: 24.92 KG/M2

## 2019-01-04 DIAGNOSIS — R10.2 PELVIC PRESSURE IN FEMALE: ICD-10-CM

## 2019-01-04 DIAGNOSIS — R39.15 URINARY URGENCY: ICD-10-CM

## 2019-01-04 DIAGNOSIS — N81.11 CYSTOCELE, MIDLINE: ICD-10-CM

## 2019-01-04 PROCEDURE — 99213 OFFICE O/P EST LOW 20 MIN: CPT | Performed by: OBSTETRICS & GYNECOLOGY

## 2019-01-04 PROCEDURE — 76830 TRANSVAGINAL US NON-OB: CPT | Performed by: OBSTETRICS & GYNECOLOGY

## 2019-01-04 NOTE — PATIENT INSTRUCTIONS
Patient Education     Kegel Exercises  Kegel exercises don t need special clothing or equipment. They re easy to learn and simple to do. And if you do them right, no one can tell you re doing them, so they can be done almost anywhere. Your healthcare provider, nurse, or physical therapist can answer any questions you have and help you get started.    A weak pelvic floor  The pelvic floor muscles may weaken due to aging, pregnancy and vaginal childbirth, injury, surgery, chronic cough, or lack of exercise. If the pelvic floor is weak, your bladder and other pelvic organs may sag out of place. The urethra may also open too easily and allow urine to leak out. Kegel exercises can help you strengthen your pelvic floor muscles. Then they can better support the pelvic organs and control urine flow.  How Kegel exercises are done  Try each of the Kegel exercises described below. When you re doing them, try not to move your leg, buttock, or stomach muscles:    Contract as if you were stopping your urine stream. But do it when you re not urinating.    Tighten your rectum as if trying not to pass gas. Contract your anus, but don t move your buttocks.    You may place a finger or 2 in the vagina and squeeze your finger with your vagina to learn which muscles to tighten.  Try to hold each Kegel for a slow count to 5. You probably won t be able to hold them for that long at first. But keep practicing. It will get easier as your pelvic floor gets stronger. Eventually, special weights that you place in your vagina may be recommended to help make your Kegels even more effective. Visit your healthcare provider if you have difficulties doing Kegel exercises.  Helpful hints  Here are some tips to follow:    Do your Kegels as often as you can. The more you do them, the faster you ll feel the results.    Pick an activity you do often as a reminder. For instance, do your Kegels every time you sit down.    Tighten your pelvic floor before  you sneeze, get up from a chair, cough, laugh, or lift. This protects your pelvic floor from injury and can help prevent urine leakage.   Date Last Reviewed: 10/1/2017    3793-0396 [employer]. [address] [place] [address], [place] [address]. All rights reserved. This information is not intended as a substitute for professional medical care. Always follow your healthcare professional's instructions.

## 2019-01-09 ENCOUNTER — OFFICE VISIT (OUTPATIENT)
Dept: SURGERY | Facility: CLINIC | Age: 69
End: 2019-01-09
Payer: COMMERCIAL

## 2019-01-09 VITALS
HEART RATE: 76 BPM | SYSTOLIC BLOOD PRESSURE: 122 MMHG | BODY MASS INDEX: 24.75 KG/M2 | DIASTOLIC BLOOD PRESSURE: 78 MMHG | OXYGEN SATURATION: 96 % | RESPIRATION RATE: 16 BRPM | HEIGHT: 66 IN | WEIGHT: 154 LBS

## 2019-01-09 DIAGNOSIS — T82.898A VASCULAR GRAFT OCCLUSION, INITIAL ENCOUNTER (H): ICD-10-CM

## 2019-01-09 DIAGNOSIS — I73.9 PAD (PERIPHERAL ARTERY DISEASE) (H): ICD-10-CM

## 2019-01-09 DIAGNOSIS — Z95.828 HISTORY OF ARTERIAL BYPASS OF LOWER EXTREMITY: Primary | ICD-10-CM

## 2019-01-09 PROCEDURE — 99213 OFFICE O/P EST LOW 20 MIN: CPT | Performed by: SURGERY

## 2019-01-09 ASSESSMENT — MIFFLIN-ST. JEOR: SCORE: 1245.29

## 2019-01-09 NOTE — LETTER
[facility] 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  She has chronic left leg lymphedema and wears a thigh-high compression stocking.  In 2018 she suffered a graft thrombosis.  She underwent successful thrombolysis with placement of a 6 mm Viabahn covered stent within the graft.  She was started empirically on Xarelto and aspirin.  She read about some side effects related to long-term Xarelto and she spontaneously stopped her own medication around 2018.  Roughly 5 days later she developed recurrent left leg ischemic symptoms.  Upon readmission she was noted to have an occluded graft.  She underwent successful repeat left leg graft thrombolysis and had a second 6 mm Viabahn stent placed within the PTFE interposition graft due to some adherent thrombus.  This was all felt to be secondary to the patient stopping her Xarelto.  She presents today for routine clinical follow-up.  She is back to her baseline and is without complaints.     Exam:  Chronic left leg lymphedema (baseline) with 2+ palpable left DP pulse.  The right femoral access site is clear.     IMPRESSION:  3 weeks status post recurrent thrombolysis of proximal left superficial femoral artery PTFE interposition graft -clinically doing well.  Palpable pedal pulses.  No claudication.     RECOMMENDATION:  I again reviewed all the above with Angeli and her .  The graft is 18 years old and long-term patency is certainly not guaranteed.  She will continue her medical regimen including aspirin and Xarelto.  Vascular surgical  follow-up will be with me in 6 months for repeat left leg graft ultrasound and an ADRIAN with exercise.     Sincerely,     Elijah Owens MD

## 2019-01-09 NOTE — PROGRESS NOTES
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  She has chronic left leg lymphedema and wears a thigh-high compression stocking.  In April 2018 she suffered a graft thrombosis.  She underwent successful thrombolysis with placement of a 6 mm Viabahn covered stent within the graft.  She was started empirically on Xarelto and aspirin.  She read about some side effects related to long-term Xarelto and she spontaneously stopped her own medication around Thanksgiving of 2018.  Roughly 5 days later she developed recurrent left leg ischemic symptoms.  Upon readmission she was noted to have an occluded graft.  She underwent successful repeat left leg graft thrombolysis and had a second 6 mm Viabahn stent placed within the PTFE interposition graft due to some adherent thrombus.  This was all felt to be secondary to the patient stopping her Xarelto.  She presents today for routine clinical follow-up.  She is back to her baseline and is without complaints.    Exam:  Chronic left leg lymphedema (baseline) with 2+ palpable left DP pulse.  The right femoral access site is clear.    IMPRESSION:  3 weeks status post recurrent thrombolysis of proximal left superficial femoral artery PTFE interposition graft -clinically doing well.  Palpable pedal pulses.  No claudication.    RECOMMENDATION:  I again reviewed all the above with Angeli and her .  The graft is 18 years old and long-term patency is certainly not guaranteed.  She will continue her medical regimen including aspirin and Xarelto.  Vascular surgical follow-up will be with me in 6 months for repeat left leg graft ultrasound and an ADRIAN with exercise.    Total face-to-face time was 15 minutes, greater than 50% spent providing counseling and  education.    Elijah Owens MD

## 2019-01-11 DIAGNOSIS — N30.01 ACUTE CYSTITIS WITH HEMATURIA: ICD-10-CM

## 2019-01-11 LAB
ALBUMIN UR-MCNC: NEGATIVE MG/DL
APPEARANCE UR: CLEAR
BACTERIA #/AREA URNS HPF: ABNORMAL /HPF
BILIRUB UR QL STRIP: NEGATIVE
COLOR UR AUTO: YELLOW
GLUCOSE UR STRIP-MCNC: NEGATIVE MG/DL
HGB UR QL STRIP: ABNORMAL
KETONES UR STRIP-MCNC: NEGATIVE MG/DL
LEUKOCYTE ESTERASE UR QL STRIP: NEGATIVE
MUCOUS THREADS #/AREA URNS LPF: ABNORMAL /LPF
NITRATE UR QL: NEGATIVE
NON-SQ EPI CELLS #/AREA URNS LPF: ABNORMAL /LPF
PH UR STRIP: 7 PH (ref 5–7)
RBC #/AREA URNS AUTO: ABNORMAL /HPF
SOURCE: ABNORMAL
SP GR UR STRIP: 1.01 (ref 1–1.03)
UROBILINOGEN UR STRIP-ACNC: 0.2 EU/DL (ref 0.2–1)
WBC #/AREA URNS AUTO: ABNORMAL /HPF

## 2019-01-11 PROCEDURE — 81001 URINALYSIS AUTO W/SCOPE: CPT | Performed by: PHYSICIAN ASSISTANT

## 2019-01-13 ENCOUNTER — MYC MEDICAL ADVICE (OUTPATIENT)
Dept: FAMILY MEDICINE | Facility: CLINIC | Age: 69
End: 2019-01-13

## 2019-01-13 DIAGNOSIS — E78.5 HYPERLIPIDEMIA LDL GOAL <70: ICD-10-CM

## 2019-01-14 ENCOUNTER — TELEPHONE (OUTPATIENT)
Dept: OTHER | Facility: CLINIC | Age: 69
End: 2019-01-14

## 2019-01-14 ENCOUNTER — HOSPITAL ENCOUNTER (OUTPATIENT)
Dept: ULTRASOUND IMAGING | Facility: CLINIC | Age: 69
End: 2019-01-14
Attending: FAMILY MEDICINE
Payer: COMMERCIAL

## 2019-01-14 ENCOUNTER — TELEPHONE (OUTPATIENT)
Dept: FAMILY MEDICINE | Facility: CLINIC | Age: 69
End: 2019-01-14

## 2019-01-14 ENCOUNTER — HOSPITAL ENCOUNTER (OUTPATIENT)
Dept: ULTRASOUND IMAGING | Facility: CLINIC | Age: 69
Discharge: HOME | End: 2019-01-14
Attending: FAMILY MEDICINE | Admitting: FAMILY MEDICINE
Payer: COMMERCIAL

## 2019-01-14 ENCOUNTER — OFFICE VISIT (OUTPATIENT)
Dept: FAMILY MEDICINE | Facility: CLINIC | Age: 69
End: 2019-01-14
Payer: COMMERCIAL

## 2019-01-14 VITALS
SYSTOLIC BLOOD PRESSURE: 123 MMHG | HEART RATE: 76 BPM | BODY MASS INDEX: 24.42 KG/M2 | DIASTOLIC BLOOD PRESSURE: 77 MMHG | OXYGEN SATURATION: 96 % | WEIGHT: 151.3 LBS | TEMPERATURE: 97.3 F | RESPIRATION RATE: 16 BRPM

## 2019-01-14 DIAGNOSIS — I73.9 PAD (PERIPHERAL ARTERY DISEASE) (H): ICD-10-CM

## 2019-01-14 DIAGNOSIS — T82.898S OCCLUSION OF LEFT FEMOROPOPLITEAL BYPASS GRAFT, SEQUELA: ICD-10-CM

## 2019-01-14 DIAGNOSIS — I89.0 LYMPHEDEMA OF LEFT LEG: ICD-10-CM

## 2019-01-14 DIAGNOSIS — T82.898S OCCLUSION OF LEFT FEMOROPOPLITEAL BYPASS GRAFT, SEQUELA: Primary | ICD-10-CM

## 2019-01-14 PROCEDURE — 93924 LWR XTR VASC STDY BILAT: CPT

## 2019-01-14 PROCEDURE — 93926 LOWER EXTREMITY STUDY: CPT | Mod: LT

## 2019-01-14 PROCEDURE — 99214 OFFICE O/P EST MOD 30 MIN: CPT | Performed by: FAMILY MEDICINE

## 2019-01-14 RX ORDER — ROSUVASTATIN CALCIUM 10 MG/1
10 TABLET, COATED ORAL EVERY EVENING
Qty: 90 TABLET | Refills: 3 | Status: ON HOLD | COMMUNITY
Start: 2019-01-14 | End: 2019-01-25

## 2019-01-14 RX ORDER — EZETIMIBE 10 MG/1
10 TABLET ORAL DAILY
Qty: 30 TABLET | Refills: 0 | Status: SHIPPED | OUTPATIENT
Start: 2019-01-14 | End: 2019-01-14

## 2019-01-14 NOTE — TELEPHONE ENCOUNTER
[facility]  calling  [phone] Estela or Brenda    Wants call back about testing ordered    Azul James RN, BS  Clinical Nurse Triage.

## 2019-01-14 NOTE — PROGRESS NOTES
SUBJECTIVE:  Angeli Jacobson is a 68 year old woman who presents with left leg heaviness and cramping just starting today.   She is not having any fever.   She is not having any swelling.   She measures her legs and they are the same.   She is not wearing compression garment today but usually does.         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    CHRONIC LEFT THIGH LYMPHEDEMA     Nontoxic multinodular goiter 2005    needs yearly US     Osteoporosis, unspecified 2001     Other lymphedema 2000    left thigh, gets regular PT for this     PAD (peripheral artery disease) (H) 4/20/2018     SHINGLES 2001     Sprain of lumbosacral (joint) (ligament) 1995    right     Unspecified hearing loss 1998    chronic tinnitus     Unspecified tinnitus 1998       Past Surgical History:   Procedure Laterality Date     C APPENDECTOMY      Appendectomy     C NONSPECIFIC PROCEDURE  04/2000    Open Biopsy Left Thigh Liposarcoma     COLONOSCOPY N/A 2/5/2016    Procedure: COMBINED COLONOSCOPY, SINGLE OR MULTIPLE BIOPSY/POLYPECTOMY BY BIOPSY;  Surgeon: Varun Stanley MD, MD;  Location: [facility]     EXCISION MALIG LESION>1.25CM  5/2000    Myxoid Liposarcoma       EXPLORE GROIN Right 5/1/2018    Procedure: EXPLORE GROIN;  EMERGENCY RIGHT FEMORAL EXPLORATION WITH FEMORAL ARTERY REPAIR.    EBL: 50mL;  Surgeon: Shade Owens MD;  Location:  OR     HC COLONOSCOPY THRU STOMA, DIAGNOSTIC  2006    due 2010     HC COLP CERVIX/UPPER VAGINA  07/1997    Negative     HC  DILATION/CURETTAGE DIAG/THER NON OB  1997    Post menopausal bleeding on HRT, negative     IR ANGIOGRAM THROUGH CATHETER FOLLOW UP  2018     IR ANGIOGRAM THROUGH CATHETER FOLLOW UP  2018     IR LOWER EXTREMITY ANGIOGRAM LEFT  2018     VASCULAR SURGERY  2018    Left SFA stent in bypass graft       MEDICATIONS:  Current Outpatient Medications   Medication     alendronate (FOSAMAX) 70 MG tablet     aspirin 81 MG EC tablet     buPROPion (WELLBUTRIN XL) 300 MG 24 hr tablet     ezetimibe (ZETIA) 10 MG tablet     ezetimibe (ZETIA) 10 MG tablet     fluticasone (FLONASE) 50 MCG/ACT spray     MULTI-VITAMIN OR TABS     order for DME     ORDER FOR DME     rivaroxaban ANTICOAGULANT (XARELTO) 20 MG TABS tablet     rivaroxaban ANTICOAGULANT (XARELTO) 15 MG TABS tablet     rosuvastatin (CRESTOR) 40 MG tablet     No current facility-administered medications for this visit.        SOCIAL HISTORY:  Social History     Tobacco Use     Smoking status: Never Smoker     Smokeless tobacco: Never Used   Substance Use Topics     Alcohol use: No       Family History   Problem Relation Age of Onset     C.A.D. Father         MI 57     Alcohol/Drug Father         etoh     Obesity Mother      Osteoporosis Mother      Colon Cancer Brother 70     Hyperlipidemia Son      Hyperlipidemia Son         very high, experimental drug     C.A.D. Paternal Grandmother         ascvd     Diabetes Maternal Grandmother      Cancer Maternal Grandmother      C.A.D. Paternal Uncle         Mi  age 48     Cancer Maternal Aunt         pancreatic CA       Objective:  Blood pressure 123/77, pulse 76, temperature 97.3  F (36.3  C), temperature source Oral, resp. rate 16, weight 68.6 kg (151 lb 4.8 oz), last menstrual period 2005, SpO2 96 %, not currently breastfeeding.  Neck:  There is no lymphadenopathy or thyroid tenderness or enlargement  Chest: Clear to auscultation bilaterally.  No wheezes, rales or retractions.  CV: Regular  rate and rhythm without murmurs, rubs or gallops.  Ext: cannot feel PT pulse but DP is strong to palpation - cooler than the right foot - monofilament not good in 4th and 5th digit    Assessment:  1. Patient with hx of left arterial lower extremity graft and now heaviness and cramping in left leg - has chronic edema - had clotting of graft last month and then had clearing - now on xartelto - could be re clotting  2. PAD  3. Hx of liposarcoma      Plan:  1. Will get left lower arterial duplex and ADRIAN US to rule out if her graft is occluded TODAY   2. Results to be called to our office and we will let vascular know results  3. Patient to follow up with primary in 6 months

## 2019-01-14 NOTE — TELEPHONE ENCOUNTER
Per Dr. Owens patient is scheduled to come and see him 130 pm tomorrow in clinic to discuss US results.    Vika LUONGN, RN

## 2019-01-14 NOTE — TELEPHONE ENCOUNTER
Please let this nurse know that the stent IS occluded.  Got a call from radiology.   They said Dr. Owens in in the OR but he does know about it.

## 2019-01-14 NOTE — TELEPHONE ENCOUNTER
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  She has chronic left leg lymphedema and wears a thigh-high compression stocking.  In April 2018 she suffered a graft thrombosis.  She underwent successful thrombolysis with placement of a 6 mm Viabahn covered stent within the graft.  She was started empirically on Xarelto and aspirin.  She read about some side effects related to long-term Xarelto and she spontaneously stopped her own medication around Thanksgiving of 2018.  Roughly 5 days later she developed recurrent left leg ischemic symptoms.  Upon readmission she was noted to have an occluded graft.  She underwent successful repeat left leg graft thrombolysis and had a second 6 mm Viabahn stent placed within the PTFE interposition graft due to some adherent thrombus.\" Dr. Owens 1/9/19    Angeli called reporting left leg heaviness and calf cramping since yesterday. She reports the discomfort is when walking and resolves with rest. Denies color changes, redness or hot to touch on left calf. Angeli states she measures her leg daily and the measurements are same.     Patient is seeing PCP clinic at 11:15 am for these symptoms and will call back after she is evaluated. I stated she needs left lower arterial duplex and ADRIAN US to rule out if her graft is occluded TODAY and she will be mentioning this to the provider she sees.    Routing to Dr. Jones as well as RACHELLE that we spoke with Angeli.     Awaiting a call back.    Vika LUONGN, RN      "

## 2019-01-15 ENCOUNTER — OFFICE VISIT (OUTPATIENT)
Dept: OTHER | Facility: CLINIC | Age: 69
End: 2019-01-15
Attending: SURGERY
Payer: COMMERCIAL

## 2019-01-15 ENCOUNTER — TELEPHONE (OUTPATIENT)
Dept: OTHER | Facility: CLINIC | Age: 69
End: 2019-01-15

## 2019-01-15 VITALS — DIASTOLIC BLOOD PRESSURE: 84 MMHG | SYSTOLIC BLOOD PRESSURE: 151 MMHG | HEART RATE: 82 BPM

## 2019-01-15 DIAGNOSIS — T82.868D THROMBOSIS OF FEMORO-POPLITEAL BYPASS GRAFT, SUBSEQUENT ENCOUNTER: Primary | ICD-10-CM

## 2019-01-15 PROCEDURE — 99213 OFFICE O/P EST LOW 20 MIN: CPT | Mod: ZP | Performed by: SURGERY

## 2019-01-15 PROCEDURE — G0463 HOSPITAL OUTPT CLINIC VISIT: HCPCS

## 2019-01-15 NOTE — NURSING NOTE
Patient Education    Procedure: Left femoral to above knee popliteal bypass with PTFE graft  Diagnosis: Occluded left leg PTFE graft  Anticoagulation Instruction: hold Xarelto 2 days prior, continue ASA  Pre-Operative Physical Exam: You need to have a pre-op physical exam within 30 days of your procedure. Your procedure may be cancelled if you do not have a current History and Physical. Call your PCP's office to schedule.  Allergies:  Updated in Epic  Bowel Prep: n/a  Post Procedure Education: [facility] post-procedure fact sheet reviewed with patient.    Learner(s):patient and significant other  Method: Listening, Reading  Barriers to Learning:No Barrier  Outcome: Patient did verbalize understanding of above education.    Nicol Buenrostro, CHERISEN, RN

## 2019-01-15 NOTE — TELEPHONE ENCOUNTER
Pt calling in to obtain US test results, wants call tonight    [phone] (home)   Telephone Information:   Mobile [phone]     Route to provider to review and advise    Azul James RN Nurse Triage

## 2019-01-15 NOTE — LETTER
[facility] [address]  [place] [address]  Phone: [phone]  Fax: [phone]    January 15, 2019    Re: Angeli ALBA Kvaon, : 1950    Please see my recent progress note from an office visit on 2019.       Angeli was just seen on 2019 following successful re-thrombolysis of her left proximal superficial femoral to mid superficial femoral PTFE interposition graft.  She has been maintained on Xarelto and aspirin.  Unfortunately the graft has reoccluded for the third time in the last 8 months.  She presents today to discuss her treatment options.     She is a short distance left leg claudicator.  She notes occasional paresthesias in the left foot. She denies rest pain or new wounds.     Exam:  Blood pressure 151/84 with a pulse of 82.  Palpable femoral pulses bilaterally.  Palpable right-sided pedal pulses.  Monophasic left-sided pedal Doppler signals.     Resting left-sided ADRIAN is 0.43.     IMPRESSION:  1.  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     2.  Chronic left leg lymphedema related to prior sarcoma resection and irradiation     RECOMMENDATION:  I reviewed all the above with Angeli and her .  We have exhausted percutaneous attempts at preserving the 19-year old graft.  I recommend a redo left leg revascularization.  Our distal target should be the distal left superficial femoral artery or perhaps the above-knee popliteal artery.  I plan to use PTFE once again to limit dissection in the left leg.  Hopefully, we will not significantly exacerbate her chronic left leg lymphedema.  She will hold her Xarelto for 2 days prior to the procedure.  She may continue her aspirin uninterrupted.  All of her questions were answered and she verbalizes full understanding.      Elijah Owens MD

## 2019-01-15 NOTE — NURSING NOTE
"Angeli Jacobson is a 68 year old female who presents for:  Chief Complaint   Patient presents with     RECHECK     f/u to LLE arterial US and ADRIAN 1/14/19 showing occlusion of graft.         Vitals:    Vitals:    01/15/19 1328   BP: 151/84   BP Location: Right arm   Patient Position: Chair   Cuff Size: Adult Large   Pulse: 82       BMI:  Estimated body mass index is 24.42 kg/m  as calculated from the following:    Height as of 1/9/19: 5' 6\" (1.676 m).    Weight as of 1/14/19: 151 lb 4.8 oz (68.6 kg).    Pain Score:  Data Unavailable        Huong Bowser MA    "

## 2019-01-16 NOTE — TELEPHONE ENCOUNTER
Type of surgery: LEFT FEMORAL TO ABOVE KNEE POPLITEAL BYPASS WITH PTFE GRAFT  Location of surgery: [facility]  Date and time of surgery: 01/12/19 @ 12:30PM  Surgeon: DR. CEDILLO  Pre-Op Appt Date: PT TO SCHEDULE AT [facility]  Post-Op Appt Date: PT TO SCHEDULE   Packet sent out: GIVEN TO PT IN CLINIC  Pre-cert/Authorization completed:  Yes  Date: 01/21/19

## 2019-01-17 ENCOUNTER — OFFICE VISIT (OUTPATIENT)
Dept: FAMILY MEDICINE | Facility: CLINIC | Age: 69
End: 2019-01-17
Payer: COMMERCIAL

## 2019-01-17 VITALS
WEIGHT: 149 LBS | RESPIRATION RATE: 16 BRPM | BODY MASS INDEX: 24.05 KG/M2 | HEART RATE: 82 BPM | SYSTOLIC BLOOD PRESSURE: 115 MMHG | TEMPERATURE: 97.5 F | DIASTOLIC BLOOD PRESSURE: 74 MMHG

## 2019-01-17 DIAGNOSIS — R73.03 PREDIABETES: ICD-10-CM

## 2019-01-17 DIAGNOSIS — Z87.440 PERSONAL HISTORY OF URINARY TRACT INFECTION: ICD-10-CM

## 2019-01-17 DIAGNOSIS — E78.5 HYPERLIPIDEMIA LDL GOAL <70: ICD-10-CM

## 2019-01-17 DIAGNOSIS — C49.9 MYXOID LIPOSARCOMA (H): ICD-10-CM

## 2019-01-17 DIAGNOSIS — T82.898S OCCLUSION OF LEFT FEMOROPOPLITEAL BYPASS GRAFT, SEQUELA: ICD-10-CM

## 2019-01-17 DIAGNOSIS — Z01.818 PREOP GENERAL PHYSICAL EXAM: Primary | ICD-10-CM

## 2019-01-17 DIAGNOSIS — F32.5 MAJOR DEPRESSION IN COMPLETE REMISSION (H): ICD-10-CM

## 2019-01-17 LAB
ALBUMIN UR-MCNC: NEGATIVE MG/DL
APPEARANCE UR: CLEAR
BACTERIA #/AREA URNS HPF: ABNORMAL /HPF
BILIRUB UR QL STRIP: NEGATIVE
CHOLEST SERPL-MCNC: 198 MG/DL
COLOR UR AUTO: YELLOW
GLUCOSE UR STRIP-MCNC: NEGATIVE MG/DL
HBA1C MFR BLD: 5.5 % (ref 0–5.6)
HDLC SERPL-MCNC: 72 MG/DL
HGB UR QL STRIP: ABNORMAL
KETONES UR STRIP-MCNC: NEGATIVE MG/DL
LDLC SERPL CALC-MCNC: 107 MG/DL
LEUKOCYTE ESTERASE UR QL STRIP: ABNORMAL
MUCOUS THREADS #/AREA URNS LPF: PRESENT /LPF
NITRATE UR QL: NEGATIVE
NON-SQ EPI CELLS #/AREA URNS LPF: ABNORMAL /LPF
NONHDLC SERPL-MCNC: 126 MG/DL
PH UR STRIP: 7 PH (ref 5–7)
RBC #/AREA URNS AUTO: ABNORMAL /HPF
SOURCE: ABNORMAL
SP GR UR STRIP: 1.01 (ref 1–1.03)
TRANS CELLS #/AREA URNS HPF: ABNORMAL /HPF
TRIGL SERPL-MCNC: 93 MG/DL
UROBILINOGEN UR STRIP-ACNC: 0.2 EU/DL (ref 0.2–1)
WBC #/AREA URNS AUTO: ABNORMAL /HPF

## 2019-01-17 PROCEDURE — 36415 COLL VENOUS BLD VENIPUNCTURE: CPT | Performed by: PHYSICIAN ASSISTANT

## 2019-01-17 PROCEDURE — 80061 LIPID PANEL: CPT | Performed by: PHYSICIAN ASSISTANT

## 2019-01-17 PROCEDURE — 93000 ELECTROCARDIOGRAM COMPLETE: CPT | Performed by: PHYSICIAN ASSISTANT

## 2019-01-17 PROCEDURE — 83036 HEMOGLOBIN GLYCOSYLATED A1C: CPT | Performed by: PHYSICIAN ASSISTANT

## 2019-01-17 PROCEDURE — 81001 URINALYSIS AUTO W/SCOPE: CPT | Performed by: PHYSICIAN ASSISTANT

## 2019-01-17 PROCEDURE — 99214 OFFICE O/P EST MOD 30 MIN: CPT | Performed by: PHYSICIAN ASSISTANT

## 2019-01-17 NOTE — H&P (VIEW-ONLY)
[facility]  [address] [address] [address]  [phone]  Dept: [phone]    PRE-OP EVALUATION:  Today's date: 2019    Angeli Jacobson (: 1950) presents for pre-operative evaluation assessment as requested by Dr. Owens.  She requires evaluation and anesthesia risk assessment prior to undergoing surgery/procedure for treatment of LEFT FEMORALTO  ABOVE KNEE POPATEAL  BYPASS WITH POLYTETRAFLUOROETHYLENE GRAFT .    Primary Physician: Sepideh Burris  Type of Anesthesia Anticipated: General    Patient has a Health Care Directive or Living Will:  YES     Preop Questions 2019   Who is doing your surgery? dr risa owens   What are you having done? femoral arterial graft replacement   Date of Surgery/Procedure: 2019   Facility or Hospital where procedure/surgery will be performed: [facility]   1.  Do you have a history of Heart attack, stroke, stent, coronary bypass surgery, or other heart surgery? No   2.  Do you ever have any pain or discomfort in your chest? No   3.  Do you have a history of  Heart Failure? No   4.   Are you troubled by shortness of breath when:  walking on a level surface, or up a slight hill, or at night? No   5.  Do you currently have a cold, bronchitis or other respiratory infection? No   6.  Do you have a cough, shortness of breath, or wheezing? No   7.  Do you sometimes get pains in the calves of your legs when you walk? YES -    8. Do you or anyone in your family have previous history of blood clots? No   9.  Do you or does anyone in your family have a serious bleeding problem such as prolonged bleeding following surgeries or cuts? No   10. Have you ever had problems with anemia or been told to take iron pills? No   11. Have you had any abnormal blood loss such as black, tarry or bloody stools, or abnormal vaginal bleeding? No   12. Have you ever had a blood transfusion? No   13. Have you or any of your  relatives ever had problems with anesthesia? YES -    14. Do you have sleep apnea, excessive snoring or daytime drowsiness? No   15. Do you have any prosthetic heart valves? No   16. Do you have prosthetic joints? No   17. Is there any chance that you may be pregnant? No         HPI:     HPI related to upcoming procedure: history of left femoropopliteal artery. The above procedure was deemed the next best step in management.       See problem list for active medical problems.  Problems all longstanding and stable, except as noted/documented.  See ROS for pertinent symptoms related to these conditions.                                                                                                                                                          .  DEPRESSION - Patient has a long history of Depression of moderate severity requiring medication for control with recent symptoms being stable..Current symptoms of depression include none.                                                                                                                                                                                    .  HYPERLIPIDEMIA - Patient has a long history of significant Hyperlipidemia requiring medication for treatment with recent good control. Patient reports no problems or side effects with the medication.                                                                                                                                                       .  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 Stable.     MEDICAL HISTORY:     Patient Active Problem List    Diagnosis Date Noted     Femoral-popliteal bypass graft occlusion, left (H) 12/19/2018     Priority: Medium     Vascular graft occlusion (H) 04/30/2018     Priority: Medium     PAD (peripheral artery disease) (H) 04/20/2018     Priority: Medium     Other constipation 06/27/2017     Priority: Medium     Vertigo  06/27/2017     Priority: Medium     Tubular adenoma 02/09/2016     Priority: Medium     Lymphedema of left leg 10/14/2014     Priority: Medium     Due to cancer       Major depressive disorder, recurrent episode, moderate (H) 06/23/2009     Priority: Medium     Cervicalgia 04/23/2009     Priority: Medium     Osteoporosis 06/19/2008     Priority: Medium     Problem list name updated by automated process. 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     Priority: Medium     CHRONIC LEFT THIGH LYMPHEDEMA       Impaired fasting glucose 06/16/2010     Priority: Low     Hearing loss 01/29/2007     Priority: Low     Has hearing aids       Pain in joint, multiple sites 10/27/2005     Priority: Low      Past Medical History:   Diagnosis Date     * * * SBE PROPHYLAXIS * * * 1998    Amox 500mg, take 4 tabs one hour prior to procedure.Takes this because of lymphedema secondary from leg surgey     Central serous retinopathy 2001    Resolved 9/2001     CHRONIC NECK PAIN 1995     Depressive disorder, not elsewhere classified 2001     MIXED HYPERLIPIDEMIA, LDL GOAL <160 1998    LDL goal < 160     MYXOID LIPOSARCOMA 2000    Left thigh, S/P excision, radiation  at [facility]     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    CHRONIC LEFT THIGH LYMPHEDEMA     Nontoxic multinodular goiter 2005    needs yearly      Osteoporosis, unspecified 2001     Other lymphedema 2000    left thigh, gets regular PT for this     PAD (peripheral artery disease) (H)  4/20/2018     SHINGLES 2001     Sprain of lumbosacral (joint) (ligament) 1995    right     Unspecified hearing loss 1998    chronic tinnitus     Unspecified tinnitus 1998     Past Surgical History:   Procedure Laterality Date     C APPENDECTOMY      Appendectomy     C NONSPECIFIC PROCEDURE  04/2000    Open Biopsy Left Thigh Liposarcoma     COLONOSCOPY N/A 2/5/2016    Procedure: COMBINED COLONOSCOPY, SINGLE OR MULTIPLE BIOPSY/POLYPECTOMY BY BIOPSY;  Surgeon: Varun Stanley MD, MD;  Location: [facility]     EXCISION MALIG LESION>1.25CM  5/2000    Myxoid Liposarcoma       EXPLORE GROIN Right 5/1/2018    Procedure: EXPLORE GROIN;  EMERGENCY RIGHT FEMORAL EXPLORATION WITH FEMORAL ARTERY REPAIR.    EBL: 50mL;  Surgeon: Shade Owens MD;  Location: [facility] OR     HC COLONOSCOPY THRU STOMA, DIAGNOSTIC  2006 due 2010     HC COLP CERVIX/UPPER VAGINA  07/1997    Negative     HC DILATION/CURETTAGE DIAG/THER NON OB  02/1997    Post menopausal bleeding on HRT, negative     IR ANGIOGRAM THROUGH CATHETER FOLLOW UP  12/20/2018     IR ANGIOGRAM THROUGH CATHETER FOLLOW UP  12/21/2018     IR LOWER EXTREMITY ANGIOGRAM LEFT  12/19/2018     VASCULAR SURGERY  04/30/2018    Left SFA stent in bypass graft     Current Outpatient Medications   Medication Sig Dispense Refill     alendronate (FOSAMAX) 70 MG tablet Take 1 tablet (70 mg) by mouth with 8oz water every 7 days 30 minutes before breakfast and remain upright during this time. 12 tablet 3     aspirin 81 MG EC tablet Take 81 mg by mouth daily       buPROPion (WELLBUTRIN XL) 300 MG 24 hr tablet Take 1 tablet (300 mg) by mouth every morning 90 tablet 1     ezetimibe (ZETIA) 10 MG tablet Take 1 tablet (10 mg) by mouth daily 90 tablet 1     fluticasone (FLONASE) 50 MCG/ACT spray Spray 1-2 sprays into both nostrils daily (Patient taking differently: Spray 1-2 sprays into both nostrils daily as needed ) 1 Bottle 11     MULTI-VITAMIN OR TABS 1 TABLET DAILY 30 prn     order for DME Equipment  "being ordered: Left Thigh High Compression Stocking, 550 CCL.3 1 each 99     ORDER FOR DME Equipment being ordered: lymphedema bandages 2 Device 1     rivaroxaban ANTICOAGULANT (XARELTO) 20 MG TABS tablet Take 1 tablet (20 mg) by mouth daily (with dinner) Start after completing 21 days of 15 mg PO BID. 90 tablet 0     rosuvastatin (CRESTOR) 10 MG tablet Take 1 tablet (10 mg) by mouth daily 90 tablet 3     OTC products: Aspirin    Allergies   Allergen Reactions     Celexa [Citalopram Hydrobromide]      Decreased libido      Latex Allergy: NO    Social History     Tobacco Use     Smoking status: Never Smoker     Smokeless tobacco: Never Used   Substance Use Topics     Alcohol use: No     History   Drug Use No       REVIEW OF SYSTEMS:   CONSTITUTIONAL: NEGATIVE for fever, chills, change in weight  INTEGUMENTARY/SKIN: NEGATIVE for worrisome rashes, moles or lesions  EYES: NEGATIVE for vision changes or irritation  ENT/MOUTH: NEGATIVE for ear, mouth and throat problems  RESP: NEGATIVE for significant cough or SOB  BREAST: NEGATIVE for masses, tenderness or discharge  CV: NEGATIVE for chest pain/chest pressure, diaphoresis, dyspnea on exertion and irregular heart beat  GI: NEGATIVE for nausea, abdominal pain, heartburn, or change in bowel habits  : mild lower abdominal \"pinching\" NEGATIVE for frequency, dysuria, or hematuria  MUSCULOSKELETAL: NEGATIVE for significant arthralgias or myalgia  NEURO: NEGATIVE for weakness, dizziness or paresthesias  ENDOCRINE: NEGATIVE for temperature intolerance, skin/hair changes  HEME: NEGATIVE for bleeding problems  PSYCHIATRIC: NEGATIVE for changes in mood or affect    EXAM:   /74 (BP Location: Right arm, Patient Position: Chair, Cuff Size: Adult Large)   Pulse 82   Temp 97.5  F (36.4  C) (Oral)   Resp 16   Wt 67.6 kg (149 lb)   LMP 03/18/2005   BMI 24.05 kg/m      GENERAL APPEARANCE: healthy, alert and no distress     EYES: EOMI, PERRL     HENT: ear canals and TM's normal " and nose and mouth without ulcers or lesions     NECK: no adenopathy, no asymmetry, masses, or scars and thyroid normal to palpation     RESP: lungs clear to auscultation - no rales, rhonchi or wheezes     CV: regular rates and rhythm and unable to palpate left PT pulse.      ABDOMEN:  soft, nontender, no HSM or masses and bowel sounds normal     MS: extremities normal- no gross deformities noted, no evidence of inflammation in joints, FROM in all extremities.     SKIN: no suspicious lesions or rashes     NEURO: Normal strength and tone, sensory exam grossly normal, mentation intact and speech normal     PSYCH: mentation appears normal. and affect normal/bright     LYMPHATICS: No cervical adenopathy    DIAGNOSTICS:     EKG: appears normal, NSR, LAD, normal intervals, no acute ST/T changes c/w ischemia, incomplete Right Bundle Branch Block  Labs Drawn and in Process:     Lab Results   Component Value Date    A1C 5.5 01/17/2019    A1C 5.8 12/19/2018    A1C 5.8 04/30/2018     UA RESULTS:  Recent Labs   Lab Test 01/17/19  0905   COLOR Yellow   APPEARANCE Clear   URINEGLC Negative   URINEBILI Negative   URINEKETONE Negative   SG 1.015   UBLD Trace*   URINEPH 7.0   PROTEIN Negative   UROBILINOGEN 0.2   NITRITE Negative   LEUKEST Trace*   RBCU O - 2   WBCU 0 - 5       Lab Results   Component Value Date    CHOL 198 01/17/2019     Lab Results   Component Value Date    HDL 72 01/17/2019     Lab Results   Component Value Date     01/17/2019     Lab Results   Component Value Date    TRIG 93 01/17/2019     Lab Results   Component Value Date    CHOLHDLRATIO 4.0 09/11/2015       Unresulted Labs Ordered in the Past 30 Days of this Admission     No orders found from 11/18/2018 to 1/18/2019.          Recent Labs   Lab Test 12/22/18  0530 12/21/18  1846 12/21/18  0600  12/19/18  1241  05/01/18  1700  04/30/18  0740   HGB 12.8 13.4 12.6   < >  --    < > 9.5*   < > 13.7    146* 151   < >  --    < > 161   < > 236   INR  --    --   --   --  0.97  --  1.43*  --  0.96     --  142   < >  --    < >  --    < >  --    POTASSIUM 3.5  --  3.4   < >  --    < >  --    < >  --    CR 0.70  --  0.58   < >  --    < >  --    < > 0.71   A1C  --   --   --   --  5.8*  --   --   --  5.8    < > = values in this interval not displayed.        IMPRESSION:   Reason for surgery/procedure: occlusion of the left femoropopliteal bypass graft.   Diagnosis/reason for consult: preoperative exam for the above procedure.     The proposed surgical procedure is considered INTERMEDIATE risk.    REVISED CARDIAC RISK INDEX  The patient has the following serious cardiovascular risks for perioperative complications such as (MI, PE, VFib and 3  AV Block):  No serious cardiac risks  INTERPRETATION: 0 risks: Class I (very low risk - 0.4% complication rate)    The patient has the following additional risks for perioperative complications:  No identified additional risks      ICD-10-CM    1. Preop general physical exam Z01.818 EKG 12-lead complete w/read - Clinics     Lipid panel reflex to direct LDL Fasting     Hemoglobin A1c     Urine Microscopic   2. Occlusion of left femoropopliteal bypass graft, sequela T82.898S Lipid panel reflex to direct LDL Fasting     Hemoglobin A1c   3. Major depression in complete remission (H) F32.5    4. Myxoid liposarcoma (H) C49.9    5. Personal history of urinary tract infection Z87.440 *UA reflex to Microscopic and Culture ([place] and [facility] (except [place] and [place])   6. Prediabetes R73.03 Lipid panel reflex to direct LDL Fasting     Hemoglobin A1c   7. Hyperlipidemia LDL goal <70 E78.5 Lipid panel reflex to direct LDL Fasting     Hemoglobin A1c       RECOMMENDATIONS:     NPO after midnight. Anticoagulation and compression stockings pre and post op to be considered given claudication history. Continue ASA. Per vascular, stop xarelto 48 hours prior.     --Patient is to take all scheduled medications on the day of surgery  EXCEPT for modifications listed below.    Anticoagulant or Antiplatelet Medication Use  ASPIRIN: Patient is at increased risk of thrombosis (e.g. MI, CVA) and aspirin 81 mg daily should be continued in the perioperative period  XARELTO: see above         APPROVAL GIVEN to proceed with proposed procedure, without further diagnostic evaluation       Signed Electronically by: Good Sosa PA-C    Copy of this evaluation report is provided to requesting physician.    [place] Preop Guidelines    Revised Cardiac Risk Index

## 2019-01-17 NOTE — PROGRESS NOTES
[facility]  [address] [facility] [address]  [phone]  Dept: [phone]    PRE-OP EVALUATION:  Today's date: 2019    Angeli Jacobson (: 1950) presents for pre-operative evaluation assessment as requested by Dr. Owens.  She requires evaluation and anesthesia risk assessment prior to undergoing surgery/procedure for treatment of LEFT FEMORALTO  ABOVE KNEE POPATEAL  BYPASS WITH POLYTETRAFLUOROETHYLENE GRAFT .    Primary Physician: Sepideh Burris  Type of Anesthesia Anticipated: General    Patient has a Health Care Directive or Living Will:  YES     Preop Questions 2019   Who is doing your surgery? dr risa owens   What are you having done? femoral arterial graft replacement   Date of Surgery/Procedure: 2019   Facility or Hospital where procedure/surgery will be performed: [facility]   1.  Do you have a history of Heart attack, stroke, stent, coronary bypass surgery, or other heart surgery? No   2.  Do you ever have any pain or discomfort in your chest? No   3.  Do you have a history of  Heart Failure? No   4.   Are you troubled by shortness of breath when:  walking on a level surface, or up a slight hill, or at night? No   5.  Do you currently have a cold, bronchitis or other respiratory infection? No   6.  Do you have a cough, shortness of breath, or wheezing? No   7.  Do you sometimes get pains in the calves of your legs when you walk? YES -    8. Do you or anyone in your family have previous history of blood clots? No   9.  Do you or does anyone in your family have a serious bleeding problem such as prolonged bleeding following surgeries or cuts? No   10. Have you ever had problems with anemia or been told to take iron pills? No   11. Have you had any abnormal blood loss such as black, tarry or bloody stools, or abnormal vaginal bleeding? No   12. Have you ever had a blood transfusion? No   13. Have you or any of your  relatives ever had problems with anesthesia? YES -    14. Do you have sleep apnea, excessive snoring or daytime drowsiness? No   15. Do you have any prosthetic heart valves? No   16. Do you have prosthetic joints? No   17. Is there any chance that you may be pregnant? No         HPI:     HPI related to upcoming procedure: history of left femoropopliteal artery. The above procedure was deemed the next best step in management.       See problem list for active medical problems.  Problems all longstanding and stable, except as noted/documented.  See ROS for pertinent symptoms related to these conditions.                                                                                                                                                          .  DEPRESSION - Patient has a long history of Depression of moderate severity requiring medication for control with recent symptoms being stable..Current symptoms of depression include none.                                                                                                                                                                                    .  HYPERLIPIDEMIA - Patient has a long history of significant Hyperlipidemia requiring medication for treatment with recent good control. Patient reports no problems or side effects with the medication.                                                                                                                                                       .  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 Stable.     MEDICAL HISTORY:     Patient Active Problem List    Diagnosis Date Noted     Femoral-popliteal bypass graft occlusion, left (H) 12/19/2018     Priority: Medium     Vascular graft occlusion (H) 04/30/2018     Priority: Medium     PAD (peripheral artery disease) (H) 04/20/2018     Priority: Medium     Other constipation 06/27/2017     Priority: Medium     Vertigo  06/27/2017     Priority: Medium     Tubular adenoma 02/09/2016     Priority: Medium     Lymphedema of left leg 10/14/2014     Priority: Medium     Due to cancer       Major depressive disorder, recurrent episode, moderate (H) 06/23/2009     Priority: Medium     Cervicalgia 04/23/2009     Priority: Medium     Osteoporosis 06/19/2008     Priority: Medium     Problem list name updated by automated process. 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     Priority: Medium     CHRONIC LEFT THIGH LYMPHEDEMA       Impaired fasting glucose 06/16/2010     Priority: Low     Hearing loss 01/29/2007     Priority: Low     Has hearing aids       Pain in joint, multiple sites 10/27/2005     Priority: Low      Past Medical History:   Diagnosis Date     * * * SBE PROPHYLAXIS * * * 1998    Amox 500mg, take 4 tabs one hour prior to procedure.Takes this because of lymphedema secondary from leg surgey     Central serous retinopathy 2001    Resolved 9/2001     CHRONIC NECK PAIN 1995     Depressive disorder, not elsewhere classified 2001     MIXED HYPERLIPIDEMIA, LDL GOAL <160 1998    LDL goal < 160     MYXOID LIPOSARCOMA 2000    Left thigh, S/P excision, radiation  at [facility]     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    CHRONIC LEFT THIGH LYMPHEDEMA     Nontoxic multinodular goiter 2005    needs yearly      Osteoporosis, unspecified 2001     Other lymphedema 2000    left thigh, gets regular PT for this     PAD (peripheral artery disease) (H)  4/20/2018     SHINGLES 2001     Sprain of lumbosacral (joint) (ligament) 1995    right     Unspecified hearing loss 1998    chronic tinnitus     Unspecified tinnitus 1998     Past Surgical History:   Procedure Laterality Date     C APPENDECTOMY      Appendectomy     C NONSPECIFIC PROCEDURE  04/2000    Open Biopsy Left Thigh Liposarcoma     COLONOSCOPY N/A 2/5/2016    Procedure: COMBINED COLONOSCOPY, SINGLE OR MULTIPLE BIOPSY/POLYPECTOMY BY BIOPSY;  Surgeon: Varun Stanley MD, MD;  Location: [facility]     EXCISION MALIG LESION>1.25CM  5/2000    Myxoid Liposarcoma       EXPLORE GROIN Right 5/1/2018    Procedure: EXPLORE GROIN;  EMERGENCY RIGHT FEMORAL EXPLORATION WITH FEMORAL ARTERY REPAIR.    EBL: 50mL;  Surgeon: Shade Owens MD;  Location: [facility] OR     HC COLONOSCOPY THRU STOMA, DIAGNOSTIC  2006 due 2010     HC COLP CERVIX/UPPER VAGINA  07/1997    Negative     HC DILATION/CURETTAGE DIAG/THER NON OB  02/1997    Post menopausal bleeding on HRT, negative     IR ANGIOGRAM THROUGH CATHETER FOLLOW UP  12/20/2018     IR ANGIOGRAM THROUGH CATHETER FOLLOW UP  12/21/2018     IR LOWER EXTREMITY ANGIOGRAM LEFT  12/19/2018     VASCULAR SURGERY  04/30/2018    Left SFA stent in bypass graft     Current Outpatient Medications   Medication Sig Dispense Refill     alendronate (FOSAMAX) 70 MG tablet Take 1 tablet (70 mg) by mouth with 8oz water every 7 days 30 minutes before breakfast and remain upright during this time. 12 tablet 3     aspirin 81 MG EC tablet Take 81 mg by mouth daily       buPROPion (WELLBUTRIN XL) 300 MG 24 hr tablet Take 1 tablet (300 mg) by mouth every morning 90 tablet 1     ezetimibe (ZETIA) 10 MG tablet Take 1 tablet (10 mg) by mouth daily 90 tablet 1     fluticasone (FLONASE) 50 MCG/ACT spray Spray 1-2 sprays into both nostrils daily (Patient taking differently: Spray 1-2 sprays into both nostrils daily as needed ) 1 Bottle 11     MULTI-VITAMIN OR TABS 1 TABLET DAILY 30 prn     order for DME Equipment  "being ordered: Left Thigh High Compression Stocking, 550 CCL.3 1 each 99     ORDER FOR DME Equipment being ordered: lymphedema bandages 2 Device 1     rivaroxaban ANTICOAGULANT (XARELTO) 20 MG TABS tablet Take 1 tablet (20 mg) by mouth daily (with dinner) Start after completing 21 days of 15 mg PO BID. 90 tablet 0     rosuvastatin (CRESTOR) 10 MG tablet Take 1 tablet (10 mg) by mouth daily 90 tablet 3     OTC products: Aspirin    Allergies   Allergen Reactions     Celexa [Citalopram Hydrobromide]      Decreased libido      Latex Allergy: NO    Social History     Tobacco Use     Smoking status: Never Smoker     Smokeless tobacco: Never Used   Substance Use Topics     Alcohol use: No     History   Drug Use No       REVIEW OF SYSTEMS:   CONSTITUTIONAL: NEGATIVE for fever, chills, change in weight  INTEGUMENTARY/SKIN: NEGATIVE for worrisome rashes, moles or lesions  EYES: NEGATIVE for vision changes or irritation  ENT/MOUTH: NEGATIVE for ear, mouth and throat problems  RESP: NEGATIVE for significant cough or SOB  BREAST: NEGATIVE for masses, tenderness or discharge  CV: NEGATIVE for chest pain/chest pressure, diaphoresis, dyspnea on exertion and irregular heart beat  GI: NEGATIVE for nausea, abdominal pain, heartburn, or change in bowel habits  : mild lower abdominal \"pinching\" NEGATIVE for frequency, dysuria, or hematuria  MUSCULOSKELETAL: NEGATIVE for significant arthralgias or myalgia  NEURO: NEGATIVE for weakness, dizziness or paresthesias  ENDOCRINE: NEGATIVE for temperature intolerance, skin/hair changes  HEME: NEGATIVE for bleeding problems  PSYCHIATRIC: NEGATIVE for changes in mood or affect    EXAM:   /74 (BP Location: Right arm, Patient Position: Chair, Cuff Size: Adult Large)   Pulse 82   Temp 97.5  F (36.4  C) (Oral)   Resp 16   Wt 67.6 kg (149 lb)   LMP 03/18/2005   BMI 24.05 kg/m      GENERAL APPEARANCE: healthy, alert and no distress     EYES: EOMI, PERRL     HENT: ear canals and TM's normal " and nose and mouth without ulcers or lesions     NECK: no adenopathy, no asymmetry, masses, or scars and thyroid normal to palpation     RESP: lungs clear to auscultation - no rales, rhonchi or wheezes     CV: regular rates and rhythm and unable to palpate left PT pulse.      ABDOMEN:  soft, nontender, no HSM or masses and bowel sounds normal     MS: extremities normal- no gross deformities noted, no evidence of inflammation in joints, FROM in all extremities.     SKIN: no suspicious lesions or rashes     NEURO: Normal strength and tone, sensory exam grossly normal, mentation intact and speech normal     PSYCH: mentation appears normal. and affect normal/bright     LYMPHATICS: No cervical adenopathy    DIAGNOSTICS:     EKG: appears normal, NSR, LAD, normal intervals, no acute ST/T changes c/w ischemia, incomplete Right Bundle Branch Block  Labs Drawn and in Process:     Lab Results   Component Value Date    A1C 5.5 01/17/2019    A1C 5.8 12/19/2018    A1C 5.8 04/30/2018     UA RESULTS:  Recent Labs   Lab Test 01/17/19  0905   COLOR Yellow   APPEARANCE Clear   URINEGLC Negative   URINEBILI Negative   URINEKETONE Negative   SG 1.015   UBLD Trace*   URINEPH 7.0   PROTEIN Negative   UROBILINOGEN 0.2   NITRITE Negative   LEUKEST Trace*   RBCU O - 2   WBCU 0 - 5       Lab Results   Component Value Date    CHOL 198 01/17/2019     Lab Results   Component Value Date    HDL 72 01/17/2019     Lab Results   Component Value Date     01/17/2019     Lab Results   Component Value Date    TRIG 93 01/17/2019     Lab Results   Component Value Date    CHOLHDLRATIO 4.0 09/11/2015       Unresulted Labs Ordered in the Past 30 Days of this Admission     No orders found from 11/18/2018 to 1/18/2019.          Recent Labs   Lab Test 12/22/18  0530 12/21/18  1846 12/21/18  0600  12/19/18  1241  05/01/18  1700  04/30/18  0740   HGB 12.8 13.4 12.6   < >  --    < > 9.5*   < > 13.7    146* 151   < >  --    < > 161   < > 236   INR  --    --   --   --  0.97  --  1.43*  --  0.96     --  142   < >  --    < >  --    < >  --    POTASSIUM 3.5  --  3.4   < >  --    < >  --    < >  --    CR 0.70  --  0.58   < >  --    < >  --    < > 0.71   A1C  --   --   --   --  5.8*  --   --   --  5.8    < > = values in this interval not displayed.        IMPRESSION:   Reason for surgery/procedure: occlusion of the left femoropopliteal bypass graft.   Diagnosis/reason for consult: preoperative exam for the above procedure.     The proposed surgical procedure is considered INTERMEDIATE risk.    REVISED CARDIAC RISK INDEX  The patient has the following serious cardiovascular risks for perioperative complications such as (MI, PE, VFib and 3  AV Block):  No serious cardiac risks  INTERPRETATION: 0 risks: Class I (very low risk - 0.4% complication rate)    The patient has the following additional risks for perioperative complications:  No identified additional risks      ICD-10-CM    1. Preop general physical exam Z01.818 EKG 12-lead complete w/read - Clinics     Lipid panel reflex to direct LDL Fasting     Hemoglobin A1c     Urine Microscopic   2. Occlusion of left femoropopliteal bypass graft, sequela T82.898S Lipid panel reflex to direct LDL Fasting     Hemoglobin A1c   3. Major depression in complete remission (H) F32.5    4. Myxoid liposarcoma (H) C49.9    5. Personal history of urinary tract infection Z87.440 *UA reflex to Microscopic and Culture ([facility] (except [place] and [place])   6. Prediabetes R73.03 Lipid panel reflex to direct LDL Fasting     Hemoglobin A1c   7. Hyperlipidemia LDL goal <70 E78.5 Lipid panel reflex to direct LDL Fasting     Hemoglobin A1c       RECOMMENDATIONS:     NPO after midnight. Anticoagulation and compression stockings pre and post op to be considered given claudication history. Continue ASA. Per vascular, stop xarelto 48 hours prior.     --Patient is to take all scheduled medications on the day of surgery  EXCEPT for modifications listed below.    Anticoagulant or Antiplatelet Medication Use  ASPIRIN: Patient is at increased risk of thrombosis (e.g. MI, CVA) and aspirin 81 mg daily should be continued in the perioperative period  XARELTO: see above         APPROVAL GIVEN to proceed with proposed procedure, without further diagnostic evaluation       Signed Electronically by: Good Sosa PA-C    Copy of this evaluation report is provided to requesting physician.    [place] Preop Guidelines    Revised Cardiac Risk Index

## 2019-01-20 ENCOUNTER — ANESTHESIA EVENT (OUTPATIENT)
Dept: SURGERY | Facility: CLINIC | Age: 69
DRG: 253 | End: 2019-01-20
Payer: COMMERCIAL

## 2019-01-20 NOTE — ANESTHESIA PREPROCEDURE EVALUATION
Anesthesia Pre-Procedure Evaluation    Patient: Angeli Jacobson   MRN: [id number] : 1950          Preoperative Diagnosis: OCCLUDED LEFT LEG POLYTETRAFLUOROETHYLENE GRAFT    Procedure(s):  LEFT FEMORALTO  ABOVE KNEE POPATEAL  BYPASS WITH POLYTETRAFLUOROETHYLENE GRAFT    Past Medical History:   Diagnosis Date     * * * SBE PROPHYLAXIS * * *     Amox 500mg, take 4 tabs one hour prior to procedure.Takes this because of lymphedema secondary from leg surgey     Central serous retinopathy     Resolved 2001     CHRONIC NECK PAIN      Depressive disorder, not elsewhere classified      MIXED HYPERLIPIDEMIA, LDL GOAL <160     LDL goal < 160     MYXOID LIPOSARCOMA     Left thigh, S/P excision, radiation  at [facility]     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    CHRONIC LEFT THIGH LYMPHEDEMA     Nontoxic multinodular goiter 2005    needs yearly US     Osteoporosis, unspecified      Other lymphedema     left thigh, gets regular PT for this     PAD (peripheral artery disease) (H) 2018     SHINGLES      Sprain of lumbosacral (joint) (ligament)     right     Unspecified hearing loss     chronic tinnitus     Unspecified tinnitus      Past Surgical History:   Procedure Laterality Date     C APPENDECTOMY      Appendectomy     C NONSPECIFIC PROCEDURE  2000    Open Biopsy Left Thigh Liposarcoma     COLONOSCOPY N/A 2016    Procedure: COMBINED COLONOSCOPY, SINGLE OR MULTIPLE BIOPSY/POLYPECTOMY BY BIOPSY;  Surgeon: Varun Stanley MD, MD;  Location: [facility]     EXCISION MALIG LESION>1.25CM  2000    Myxoid Liposarcoma       EXPLORE GROIN Right 2018    Procedure: EXPLORE GROIN;  EMERGENCY RIGHT FEMORAL EXPLORATION WITH FEMORAL ARTERY REPAIR.    EBL: 50mL;  Surgeon: Shade Owens MD;  Location:  OR     HC COLONOSCOPY THRU STOMA, DIAGNOSTIC      due      HC COLP CERVIX/UPPER VAGINA  1997    Negative     HC DILATION/CURETTAGE DIAG/THER NON OB  1997     Post menopausal bleeding on HRT, negative     IR ANGIOGRAM THROUGH CATHETER FOLLOW UP  12/20/2018     IR ANGIOGRAM THROUGH CATHETER FOLLOW UP  12/21/2018     IR LOWER EXTREMITY ANGIOGRAM LEFT  12/19/2018     VASCULAR SURGERY  04/30/2018    Left SFA stent in bypass graft       Anesthesia Evaluation     . Pt has had prior anesthetic.     History of anesthetic complications   - PONV        ROS/MED HX    ENT/Pulmonary:      (-) sleep apnea   Neurologic: Comment: Tinnitus, [place], neck pain, forgetful       Cardiovascular: Comment: SR,IRBBB    (+) Dyslipidemia, -Peripheral Vascular Disease---. : . . . :. .       METS/Exercise Tolerance:     Hematologic:         Musculoskeletal:         GI/Hepatic:        (-) GERD   Renal/Genitourinary:         Endo: Comment: Nontoxic goiter    (+) thyroid problem .      Psychiatric:     (+) psychiatric history depression      Infectious Disease:         Malignancy:   (+)   Left leg liposarcoma         Other: Comment: Central serous retinopathy                          Physical Exam      Airway   Mallampati: II  TM distance: >3 FB  Neck ROM: full    Dental   (+) implants and caps    Cardiovascular   Rhythm and rate: regular      Pulmonary    breath sounds clear to auscultation            Lab Results   Component Value Date    WBC 7.5 12/22/2018    HGB 12.8 12/22/2018    HCT 38.9 12/22/2018     12/22/2018    CRP 3.0 06/06/2006    SED 62 (H) 05/16/2006     12/22/2018    POTASSIUM 3.5 12/22/2018    CHLORIDE 108 12/22/2018    CO2 26 12/22/2018    BUN 7 12/22/2018    CR 0.70 12/22/2018    GLC 95 12/22/2018    LONG 8.1 (L) 12/22/2018    MAG 1.8 05/01/2018    ALBUMIN 4.0 08/28/2018    PROTTOTAL 7.2 08/28/2018    ALT 40 08/28/2018    AST 32 08/28/2018    ALKPHOS 63 08/28/2018    BILITOTAL 0.3 08/28/2018    PTT 27 12/19/2018    INR 0.97 12/19/2018    TSH 3.60 10/03/2017    T4 0.94 08/24/2017       Preop Vitals  BP Readings from Last 3 Encounters:   01/17/19 115/74   01/15/19 151/84  "  01/14/19 123/77    Pulse Readings from Last 3 Encounters:   01/17/19 82   01/15/19 82   01/14/19 76      Resp Readings from Last 3 Encounters:   01/17/19 16   01/14/19 16   01/09/19 16    SpO2 Readings from Last 3 Encounters:   01/14/19 96%   01/09/19 96%   12/28/18 96%      Temp Readings from Last 1 Encounters:   01/17/19 36.4  C (97.5  F) (Oral)    Ht Readings from Last 1 Encounters:   01/09/19 1.676 m (5' 6\")      Wt Readings from Last 1 Encounters:   01/17/19 67.6 kg (149 lb)    Estimated body mass index is 24.05 kg/m  as calculated from the following:    Height as of 1/9/19: 1.676 m (5' 6\").    Weight as of 1/17/19: 67.6 kg (149 lb).     Procedure: Procedure(s):  LEFT FEMORALTO  ABOVE KNEE POPATEAL  BYPASS WITH POLYTETRAFLUOROETHYLENE GRAFT  Preop diagnosis: OCCLUDED LEFT LEG POLYTETRAFLUOROETHYLENE GRAFT    Allergies   Allergen Reactions     Celexa [Citalopram Hydrobromide]      Decreased libido     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, S/P excision, radiation  at [facility]     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    CHRONIC LEFT THIGH LYMPHEDEMA     Nontoxic multinodular goiter 2005    needs yearly US     Osteoporosis, unspecified 2001     Other lymphedema 2000    left thigh, gets regular PT for this     PAD (peripheral artery disease) (H) 4/20/2018     PONV (postoperative nausea and vomiting)      SHINGLES 2001     Sprain of lumbosacral (joint) (ligament) 1995    right     Unspecified hearing loss 1998    chronic tinnitus     Unspecified tinnitus 1998     Past Surgical History: "   Procedure Laterality Date     C APPENDECTOMY      Appendectomy     C NONSPECIFIC PROCEDURE  04/2000    Open Biopsy Left Thigh Liposarcoma     COLONOSCOPY N/A 2/5/2016    Procedure: COMBINED COLONOSCOPY, SINGLE OR MULTIPLE BIOPSY/POLYPECTOMY BY BIOPSY;  Surgeon: Varun Stanley MD, MD;  Location: [facility]     EXCISION MALIG LESION>1.25CM  5/2000    Myxoid Liposarcoma       EXPLORE GROIN Right 5/1/2018    Procedure: EXPLORE GROIN;  EMERGENCY RIGHT FEMORAL EXPLORATION WITH FEMORAL ARTERY REPAIR.    EBL: 50mL;  Surgeon: Shade Owens MD;  Location: [facility]     HC COLONOSCOPY THRU STOMA, DIAGNOSTIC  2006    due 2010     HC COLP CERVIX/UPPER VAGINA  07/1997    Negative     HC DILATION/CURETTAGE DIAG/THER NON OB  02/1997    Post menopausal bleeding on HRT, negative     IR ANGIOGRAM THROUGH CATHETER FOLLOW UP  12/20/2018     IR ANGIOGRAM THROUGH CATHETER FOLLOW UP  12/21/2018     IR LOWER EXTREMITY ANGIOGRAM LEFT  12/19/2018     VASCULAR SURGERY  04/30/2018    Left SFA stent in bypass graft     Social History     Tobacco Use     Smoking status: Never Smoker     Smokeless tobacco: Never Used   Substance Use Topics     Alcohol use: No     Prior to Admission medications    Medication Sig Start Date End Date Taking? Authorizing Provider   Acetaminophen 325 MG CAPS Take 325-650 mg by mouth 2 times daily as needed   Yes Reported, Patient   alendronate (FOSAMAX) 70 MG tablet Take 1 tablet (70 mg) by mouth with 8oz water every 7 days 30 minutes before breakfast and remain upright during this time.  Patient taking differently: Take 70 mg by mouth every 7 days Take 1 tablet (70 mg) by mouth with 8oz water every 7 days 30 minutes before breakfast and remain upright during this time. 8/28/18  Yes Sepideh Burris PA-C   aspirin 81 MG EC tablet Take 81 mg by mouth daily   Yes Reported, Patient   buPROPion (WELLBUTRIN XL) 300 MG 24 hr tablet Take 1 tablet (300 mg) by mouth every morning 8/28/18  Yes Sepideh Burris PA-C   CALCIUM  PO Take 1 tablet by mouth daily   Yes Reported, Patient   ezetimibe (ZETIA) 10 MG tablet Take 1 tablet (10 mg) by mouth daily 12/28/18  Yes Sepideh Burris PA-C   fluticasone (FLONASE) 50 MCG/ACT spray Spray 1-2 sprays into both nostrils daily  Patient taking differently: Spray 1-2 sprays into both nostrils daily as needed  5/4/18  Yes Sepideh Burris PA-C   multivitamin, therapeutic (THERA-VIT) TABS tablet Take 1 tablet by mouth daily   Yes Reported, Patient   rivaroxaban ANTICOAGULANT (XARELTO) 20 MG TABS tablet Take 1 tablet (20 mg) by mouth daily (with dinner) Start after completing 21 days of 15 mg PO BID.  Patient taking differently: Take 20 mg by mouth daily (with dinner)  1/12/19 4/12/19 Yes Ramses Benitez MD   rosuvastatin (CRESTOR) 10 MG tablet Take 10 mg by mouth every evening  1/14/19  Yes Rupal Frankel MD   order for DME Equipment being ordered: Left Thigh High Compression Stocking, 550 CCL.3 2/8/16   Yanelis Bailey MD   ORDER FOR DME Equipment being ordered: lymphedema bandages 10/14/14   Yanelis Bailey MD     Current Facility-Administered Medications Ordered in Epic   Medication Dose Route Frequency Last Rate Last Dose     ceFAZolin (ANCEF) intermittent infusion 2 g in 100 mL dextrose PRE-MIX  2 g Intravenous Pre-Op/Pre-procedure x 1 dose         lactated ringers infusion   Intravenous Continuous 25 mL/hr at 01/21/19 1018       lidocaine 1 % 1 mL  1 mL Other Q1H PRN   0.5 mL at 01/21/19 1018     No current [facility]-ordered outpatient medications on file.       lactated ringers 25 mL/hr at 01/21/19 1018     Wt Readings from Last 1 Encounters:   01/21/19 66.9 kg (147 lb 9 oz)     Temp Readings from Last 1 Encounters:   01/21/19 36.8  C (98.3  F) (Temporal)     BP Readings from Last 6 Encounters:   01/21/19 145/88   01/17/19 115/74   01/15/19 151/84   01/14/19 123/77   01/09/19 122/78   01/04/19 122/80     Pulse Readings from Last 4 Encounters:   01/21/19 80   01/17/19 82   01/15/19 82    01/14/19 76     Resp Readings from Last 1 Encounters:   01/21/19 17   @LASTSAO2(1)@  Recent Labs   Lab Test 12/22/18  0530 12/21/18  0600    142   POTASSIUM 3.5 3.4   CHLORIDE 108 111*   CO2 26 24   ANIONGAP 9 7   GLC 95 88   BUN 7 5*   CR 0.70 0.58   LONG 8.1* 7.9*     Recent Labs   Lab Test 08/28/18  1041 08/02/18  0858 08/24/17  0929   AST 32  --  27   ALT 40 38 30   ALKPHOS 63  --  88   BILITOTAL 0.3  --  0.5     Recent Labs   Lab Test 12/22/18  0530 12/21/18  1846   WBC 7.5 7.3   HGB 12.8 13.4    146*     Recent Labs   Lab Test 01/21/19  0922   ABO A   [facility] Neg     Recent Labs   Lab Test 12/19/18  1241 05/01/18  1700   INR 0.97 1.43*   PTT 27 32      No results for input(s): TROPI in the last 95536 hours.  No results for input(s): PH, PCO2, PO2, HCO3 in the last 61134 hours.  No results for input(s): HCG in the last 60227 hours.  Recent Results (from the past 744 hour(s))   IR Angiogram through Catheter Follow Up    Narrative    INTERVENTIONAL RADIOLOGY ANGIOGRAM THROUGH CATHETER FOLLOW UP   12/21/2018 5:26 PM     HISTORY:  68-year-old female with thrombosed left superficial femoral  artery PTFE bypass graft. Patient is undergoing catheter-directed  thrombolysis.    COMPARISON: Angiogram dated 12/20/2018.    FINDINGS: The patient was placed in a supine position on the  fluoroscopy table. The external portions and skin entry site of  existing crossover sheath were prepped and draped in the usual sterile  manner. 1% lidocaine was injected for local anesthesia. An angiogram  was performed through existing crossover sheath and infusion catheter.    The right superficial femoral artery bypass graft was patent. There  remained significant nonocclusive thrombus within the proximal aspects  of the bypass graft. It was felt that this was likely chronic thrombus  and therefore would be refractory to further treatment with lytics. It  was also felt that balloon angioplasty could risk dislodging this  thrombus  into the distal arteries of the left lower extremity. The  distal left superficial femoral artery, popliteal artery were patent  without significant stenoses. There was three-vessel runoff to the  left foot.    It was elected to cover the organized thrombus within the proximal  aspects of the graft. A wire was passed into the distal superficial  femoral artery. Over the wire, a 6 mm Viabahn stent was deployed  extending from the proximal superficial femoral artery into a  previously placed Viabahn stent within the PTFE graft with an overlap  of approximately 1.25 cm. The new Viabahn stent was dilated with a 5  mm balloon. Follow-up angiogram showed good flow through the  interposition graft and superficial femoral artery. There was good  flow through the left profunda femoris artery. At this time, the  existing sheath and catheters were removed. The right femoral puncture  site was closed with a 6 [place] Angio-Seal.    I determined this patient to be an appropriate candidate for the  planned sedation and procedure and reassessed the patient immediately  prior to sedation and procedure. The patient tolerated the procedure  well. There were no immediate postprocedure complications. The  patient's vital signs were monitored by radiology nursing staff under  my supervision and remained stable throughout the study.     Medications: 50 mg Benadryl    Fluoroscopy time: 3.1 minutes    Total fluoroscopy dose: 63.75 mGy    Contrast: 57 mL Visipaque      Impression    IMPRESSION: Patent left superficial femoral artery interposition PTFE  bypass graft. Organized thrombus within the proximal aspects of the  graft was covered with a new 6 mm Viabahn stent.    ADILIA MUNROE MD   US Transvaginal Non OB    Narrative    Gynecological Ultrasound Report  Pelvic U/S - Transvaginal  [facility] [place]      Referring Provider: Dr. Salcedo Masters  Sonographer:  Tequila Souza RDMS  Indication: Pelvic pressure  LMP:  Patient's last menstrual period was 03/18/2005.  History: ovarian cysts    Gynecological Ultrasonography:   Uterus: anteverted  Size: 6.4 x 3.8 x 2.0cm  Endometrium: Thickness total 3.7mm  Right Ovary: 1.9 x 1.2 x 0.9cm   Left Ovary: not visualized  Cul de Sac/Pouch of Lopez: no ff      Impression:   Normal right adnexa, Left ovary not visualized. No free fluid.  Normal uterus, endometrium 3.7mm.    Denisse Benitezs, DO                 US ADRIAN Doppler with Exercise    Narrative    US ANKLE BRACHIAL INDEX DOPPLER WITH EXERCISE BILATERAL   1/14/2019  3:32 PM     HISTORY: Occlusion of left femoropopliteal bypass graft, sequela.  Lymphedema of left leg. PAD (peripheral artery disease) (H).    COMPARISON: 12/21/2018, 12/12/2018    FINDINGS:  Right ADRIAN: 1.29, previously 1.27.  Left ADRIAN: 0.4, previously 0.62.    Waveforms: Triphasic on the right and monophasic on the left    Exercise: Patient exercised on a treadmill for 2.2 minutes at 1.5  miles per hour at a 10% incline. Patient complained of weakness at 1  minute in the left leg, cramping in the left calf at 1 minute and 30  seconds and stopped at 2 minutes and 20 seconds. Flow detected in the  left dorsalis pedis post exercise.    Right exercise ADRIAN: 1.35, previously 1.26.  Left exercise ADRIAN: 0, previously 0.28      Impression    IMPRESSION:   1. Normal right resting and exercise ABIs.  2. Left ADRIAN shows severe arterial insufficiency which drops to 0 with  exercise. This is consistent with an occluded bypass graft seen on the  ultrasound from today.    ADRIAN CRITERIA:  >0.95 Normal  0.90 - 0.94 Mild  0.5 - 0.89 Moderate  0.2 - 0.49 Severe  <0.2 Critical    GEORGE HOLLEY, DO   US Lower Extremity Arterial Duplex Left    Narrative    ULTRASOUND LEFT LOWER EXTREMITY ARTERIAL DUPLEX  1/14/2019 3:33 PM     HISTORY:  Patient with 18-year-old graft and with heaviness in left  leg.  On Xarelto. Occlusion of left femoropopliteal bypass graft,  sequela. Lymphedema of left  leg. PAD (peripheral artery disease) (H).    COMPARISON: Angiogram 12/21/2018    FINDINGS: Color Doppler and spectral waveform analysis was performed  throughout the left superficial femoral artery bypass graft. The  proximal bypass graft is occluded. Trickle flow is noted in the mid  and distal bypass. Inflow and outflow arteries are patent.      Impression    IMPRESSION: Occlusion of the left femoral bypass graft.    Finding were discussed with Dr. Owens and Dr. Frankel.    GEORGE HOLLEY DO       RECENT LABS:   ECG:   ECHO:     Anesthesia Plan      History & Physical Review  History and physical reviewed and following examination; no interval change.    ASA Status:  3 .        Plan for General and ETT   PONV prophylaxis:  Ondansetron (or other 5HT-3)  Additional equipment: Videolaryngoscope phenylephrine infusion       Postoperative Care      Consents  Anesthetic plan, risks, benefits and alternatives discussed with:  Patient..                 Melina Motley MD

## 2019-01-21 ENCOUNTER — HOSPITAL ENCOUNTER (INPATIENT)
Facility: CLINIC | Age: 69
LOS: 4 days | Discharge: HOME | DRG: 253 | End: 2019-01-25
Attending: SURGERY | Admitting: SURGERY
Payer: COMMERCIAL

## 2019-01-21 ENCOUNTER — ANESTHESIA (OUTPATIENT)
Dept: SURGERY | Facility: CLINIC | Age: 69
DRG: 253 | End: 2019-01-21
Payer: COMMERCIAL

## 2019-01-21 ENCOUNTER — OFFICE VISIT (OUTPATIENT)
Dept: SURGERY | Facility: PHYSICIAN GROUP | Age: 69
End: 2019-01-21
Payer: COMMERCIAL

## 2019-01-21 DIAGNOSIS — I73.9 PAD (PERIPHERAL ARTERY DISEASE) (H): ICD-10-CM

## 2019-01-21 DIAGNOSIS — M25.50 PAIN IN JOINT, MULTIPLE SITES: ICD-10-CM

## 2019-01-21 DIAGNOSIS — Z85.831 HISTORY OF SARCOMA: Primary | ICD-10-CM

## 2019-01-21 LAB
ABO + RH BLD: NORMAL
ABO + RH BLD: NORMAL
BLD GP AB SCN SERPL QL: NORMAL
BLOOD BANK CMNT PATIENT-IMP: NORMAL
SPECIMEN EXP DATE BLD: NORMAL

## 2019-01-21 PROCEDURE — 71000012 ZZH RECOVERY PHASE 1 LEVEL 1 FIRST HR: Performed by: SURGERY

## 2019-01-21 PROCEDURE — 25000128 H RX IP 250 OP 636: Performed by: NURSE ANESTHETIST, CERTIFIED REGISTERED

## 2019-01-21 PROCEDURE — C1768 GRAFT, VASCULAR: HCPCS | Performed by: SURGERY

## 2019-01-21 PROCEDURE — 12000000 ZZH R&B MED SURG/OB

## 2019-01-21 PROCEDURE — 25000125 ZZHC RX 250: Performed by: SURGERY

## 2019-01-21 PROCEDURE — 25000132 ZZH RX MED GY IP 250 OP 250 PS 637: Performed by: SURGERY

## 2019-01-21 PROCEDURE — 36000050 ZZH SURGERY LEVEL 2 1ST 30 MIN: Performed by: SURGERY

## 2019-01-21 PROCEDURE — 25000566 ZZH SEVOFLURANE, EA 15 MIN: Performed by: SURGERY

## 2019-01-21 PROCEDURE — 35656 BPG FEMORAL-POPLITEAL: CPT | Mod: LT | Performed by: SURGERY

## 2019-01-21 PROCEDURE — 93005 ELECTROCARDIOGRAM TRACING: CPT

## 2019-01-21 PROCEDURE — 37000008 ZZH ANESTHESIA TECHNICAL FEE, 1ST 30 MIN: Performed by: SURGERY

## 2019-01-21 PROCEDURE — 25000132 ZZH RX MED GY IP 250 OP 250 PS 637: Performed by: PHYSICIAN ASSISTANT

## 2019-01-21 PROCEDURE — 25000128 H RX IP 250 OP 636: Performed by: ANESTHESIOLOGY

## 2019-01-21 PROCEDURE — 25000125 ZZHC RX 250: Performed by: ANESTHESIOLOGY

## 2019-01-21 PROCEDURE — 86900 BLOOD TYPING SEROLOGIC ABO: CPT | Performed by: SURGERY

## 2019-01-21 PROCEDURE — 25000125 ZZHC RX 250: Performed by: NURSE ANESTHETIST, CERTIFIED REGISTERED

## 2019-01-21 PROCEDURE — 86850 RBC ANTIBODY SCREEN: CPT | Performed by: SURGERY

## 2019-01-21 PROCEDURE — 37000009 ZZH ANESTHESIA TECHNICAL FEE, EACH ADDTL 15 MIN: Performed by: SURGERY

## 2019-01-21 PROCEDURE — 041L0JL BYPASS LEFT FEMORAL ARTERY TO POPLITEAL ARTERY WITH SYNTHETIC SUBSTITUTE, OPEN APPROACH: ICD-10-PCS | Performed by: SURGERY

## 2019-01-21 PROCEDURE — 40000170 ZZH STATISTIC PRE-PROCEDURE ASSESSMENT II: Performed by: SURGERY

## 2019-01-21 PROCEDURE — 71000013 ZZH RECOVERY PHASE 1 LEVEL 1 EA ADDTL HR: Performed by: SURGERY

## 2019-01-21 PROCEDURE — C1757 CATH, THROMBECTOMY/EMBOLECT: HCPCS | Performed by: SURGERY

## 2019-01-21 PROCEDURE — 25000132 ZZH RX MED GY IP 250 OP 250 PS 637: Performed by: STUDENT IN AN ORGANIZED HEALTH CARE EDUCATION/TRAINING PROGRAM

## 2019-01-21 PROCEDURE — 99223 1ST HOSP IP/OBS HIGH 75: CPT | Performed by: INTERNAL MEDICINE

## 2019-01-21 PROCEDURE — C1758 CATHETER, URETERAL: HCPCS | Performed by: SURGERY

## 2019-01-21 PROCEDURE — 93010 ELECTROCARDIOGRAM REPORT: CPT | Performed by: INTERNAL MEDICINE

## 2019-01-21 PROCEDURE — 36415 COLL VENOUS BLD VENIPUNCTURE: CPT | Performed by: SURGERY

## 2019-01-21 PROCEDURE — 36000052 ZZH SURGERY LEVEL 2 EA 15 ADDTL MIN: Performed by: SURGERY

## 2019-01-21 PROCEDURE — 25000128 H RX IP 250 OP 636: Performed by: SURGERY

## 2019-01-21 PROCEDURE — 86901 BLOOD TYPING SEROLOGIC RH(D): CPT | Performed by: SURGERY

## 2019-01-21 PROCEDURE — 27210794 ZZH OR GENERAL SUPPLY STERILE: Performed by: SURGERY

## 2019-01-21 DEVICE — GRAFT PROPATEN RR 6MMX50CM THIN WALL HT064050A: Type: IMPLANTABLE DEVICE | Site: LEG | Status: FUNCTIONAL

## 2019-01-21 RX ORDER — ACETAMINOPHEN 325 MG/1
650 TABLET ORAL EVERY 4 HOURS PRN
Status: DISCONTINUED | OUTPATIENT
Start: 2019-01-24 | End: 2019-01-25 | Stop reason: HOSPADM

## 2019-01-21 RX ORDER — HEPARIN SODIUM 1000 [USP'U]/ML
INJECTION, SOLUTION INTRAVENOUS; SUBCUTANEOUS PRN
Status: DISCONTINUED | OUTPATIENT
Start: 2019-01-21 | End: 2019-01-21

## 2019-01-21 RX ORDER — OXYCODONE HYDROCHLORIDE 5 MG/1
5 TABLET ORAL EVERY 4 HOURS PRN
Status: DISCONTINUED | OUTPATIENT
Start: 2019-01-21 | End: 2019-01-25 | Stop reason: HOSPADM

## 2019-01-21 RX ORDER — NALOXONE HYDROCHLORIDE 0.4 MG/ML
.1-.4 INJECTION, SOLUTION INTRAMUSCULAR; INTRAVENOUS; SUBCUTANEOUS
Status: DISCONTINUED | OUTPATIENT
Start: 2019-01-21 | End: 2019-01-25 | Stop reason: HOSPADM

## 2019-01-21 RX ORDER — LIDOCAINE HYDROCHLORIDE 20 MG/ML
INJECTION, SOLUTION INFILTRATION; PERINEURAL PRN
Status: DISCONTINUED | OUTPATIENT
Start: 2019-01-21 | End: 2019-01-21

## 2019-01-21 RX ORDER — FENTANYL CITRATE 50 UG/ML
INJECTION, SOLUTION INTRAMUSCULAR; INTRAVENOUS PRN
Status: DISCONTINUED | OUTPATIENT
Start: 2019-01-21 | End: 2019-01-21

## 2019-01-21 RX ORDER — ACETAMINOPHEN 325 MG/1
975 TABLET ORAL EVERY 8 HOURS
Status: DISPENSED | OUTPATIENT
Start: 2019-01-21 | End: 2019-01-24

## 2019-01-21 RX ORDER — SODIUM CHLORIDE, SODIUM LACTATE, POTASSIUM CHLORIDE, CALCIUM CHLORIDE 600; 310; 30; 20 MG/100ML; MG/100ML; MG/100ML; MG/100ML
INJECTION, SOLUTION INTRAVENOUS CONTINUOUS
Status: DISCONTINUED | OUTPATIENT
Start: 2019-01-21 | End: 2019-01-21 | Stop reason: HOSPADM

## 2019-01-21 RX ORDER — FENTANYL CITRATE 50 UG/ML
25-50 INJECTION, SOLUTION INTRAMUSCULAR; INTRAVENOUS
Status: DISCONTINUED | OUTPATIENT
Start: 2019-01-21 | End: 2019-01-21 | Stop reason: HOSPADM

## 2019-01-21 RX ORDER — HYDROMORPHONE HYDROCHLORIDE 1 MG/ML
.3-.5 INJECTION, SOLUTION INTRAMUSCULAR; INTRAVENOUS; SUBCUTANEOUS EVERY 5 MIN PRN
Status: DISCONTINUED | OUTPATIENT
Start: 2019-01-21 | End: 2019-01-21 | Stop reason: HOSPADM

## 2019-01-21 RX ORDER — CEFAZOLIN SODIUM 1 G/3ML
1 INJECTION, POWDER, FOR SOLUTION INTRAMUSCULAR; INTRAVENOUS EVERY 8 HOURS
Status: COMPLETED | OUTPATIENT
Start: 2019-01-21 | End: 2019-01-22

## 2019-01-21 RX ORDER — GLYCOPYRROLATE 0.2 MG/ML
INJECTION, SOLUTION INTRAMUSCULAR; INTRAVENOUS PRN
Status: DISCONTINUED | OUTPATIENT
Start: 2019-01-21 | End: 2019-01-21

## 2019-01-21 RX ORDER — ASPIRIN 81 MG/1
81 TABLET ORAL DAILY
Status: DISCONTINUED | OUTPATIENT
Start: 2019-01-22 | End: 2019-01-25

## 2019-01-21 RX ORDER — EZETIMIBE 10 MG/1
10 TABLET ORAL DAILY
Status: DISCONTINUED | OUTPATIENT
Start: 2019-01-22 | End: 2019-01-25 | Stop reason: HOSPADM

## 2019-01-21 RX ORDER — ALBUTEROL SULFATE 0.83 MG/ML
2.5 SOLUTION RESPIRATORY (INHALATION) EVERY 4 HOURS PRN
Status: DISCONTINUED | OUTPATIENT
Start: 2019-01-21 | End: 2019-01-21 | Stop reason: HOSPADM

## 2019-01-21 RX ORDER — PROTAMINE SULFATE 10 MG/ML
INJECTION, SOLUTION INTRAVENOUS PRN
Status: DISCONTINUED | OUTPATIENT
Start: 2019-01-21 | End: 2019-01-21

## 2019-01-21 RX ORDER — ASPIRIN 81 MG/1
81 TABLET ORAL DAILY
Status: DISCONTINUED | OUTPATIENT
Start: 2019-01-21 | End: 2019-01-21

## 2019-01-21 RX ORDER — PROPOFOL 10 MG/ML
INJECTION, EMULSION INTRAVENOUS PRN
Status: DISCONTINUED | OUTPATIENT
Start: 2019-01-21 | End: 2019-01-21

## 2019-01-21 RX ORDER — ONDANSETRON 2 MG/ML
4 INJECTION INTRAMUSCULAR; INTRAVENOUS EVERY 30 MIN PRN
Status: DISCONTINUED | OUTPATIENT
Start: 2019-01-21 | End: 2019-01-21 | Stop reason: HOSPADM

## 2019-01-21 RX ORDER — HEPARIN SODIUM 1000 [USP'U]/ML
INJECTION, SOLUTION INTRAVENOUS; SUBCUTANEOUS CONTINUOUS PRN
Status: DISCONTINUED | OUTPATIENT
Start: 2019-01-21 | End: 2019-01-21

## 2019-01-21 RX ORDER — LIDOCAINE 40 MG/G
CREAM TOPICAL
Status: DISCONTINUED | OUTPATIENT
Start: 2019-01-21 | End: 2019-01-25 | Stop reason: HOSPADM

## 2019-01-21 RX ORDER — BUPROPION HYDROCHLORIDE 300 MG/1
300 TABLET ORAL EVERY MORNING
Status: DISCONTINUED | OUTPATIENT
Start: 2019-01-22 | End: 2019-01-22 | Stop reason: ALTCHOICE

## 2019-01-21 RX ORDER — CEFAZOLIN SODIUM 2 G/100ML
2 INJECTION, SOLUTION INTRAVENOUS
Status: COMPLETED | OUTPATIENT
Start: 2019-01-21 | End: 2019-01-21

## 2019-01-21 RX ORDER — HYDROMORPHONE HYDROCHLORIDE 1 MG/ML
.2-.4 INJECTION, SOLUTION INTRAMUSCULAR; INTRAVENOUS; SUBCUTANEOUS
Status: DISCONTINUED | OUTPATIENT
Start: 2019-01-21 | End: 2019-01-25 | Stop reason: HOSPADM

## 2019-01-21 RX ORDER — MEPERIDINE HYDROCHLORIDE 25 MG/ML
12.5 INJECTION INTRAMUSCULAR; INTRAVENOUS; SUBCUTANEOUS EVERY 5 MIN PRN
Status: DISCONTINUED | OUTPATIENT
Start: 2019-01-21 | End: 2019-01-21 | Stop reason: HOSPADM

## 2019-01-21 RX ORDER — SODIUM CHLORIDE 9 MG/ML
INJECTION, SOLUTION INTRAVENOUS CONTINUOUS
Status: DISCONTINUED | OUTPATIENT
Start: 2019-01-21 | End: 2019-01-22 | Stop reason: CLARIF

## 2019-01-21 RX ORDER — ONDANSETRON 4 MG/1
4 TABLET, ORALLY DISINTEGRATING ORAL EVERY 30 MIN PRN
Status: DISCONTINUED | OUTPATIENT
Start: 2019-01-21 | End: 2019-01-21 | Stop reason: HOSPADM

## 2019-01-21 RX ORDER — CLOPIDOGREL BISULFATE 75 MG/1
75 TABLET ORAL DAILY
Status: DISCONTINUED | OUTPATIENT
Start: 2019-01-21 | End: 2019-01-24 | Stop reason: CLARIF

## 2019-01-21 RX ORDER — ONDANSETRON 2 MG/ML
INJECTION INTRAMUSCULAR; INTRAVENOUS PRN
Status: DISCONTINUED | OUTPATIENT
Start: 2019-01-21 | End: 2019-01-21

## 2019-01-21 RX ORDER — ONDANSETRON 4 MG/1
4 TABLET, ORALLY DISINTEGRATING ORAL EVERY 6 HOURS PRN
Status: DISCONTINUED | OUTPATIENT
Start: 2019-01-21 | End: 2019-01-25 | Stop reason: HOSPADM

## 2019-01-21 RX ORDER — ONDANSETRON 2 MG/ML
4 INJECTION INTRAMUSCULAR; INTRAVENOUS EVERY 6 HOURS PRN
Status: DISCONTINUED | OUTPATIENT
Start: 2019-01-21 | End: 2019-01-25 | Stop reason: HOSPADM

## 2019-01-21 RX ORDER — FLUTICASONE PROPIONATE 50 MCG
1-2 SPRAY, SUSPENSION (ML) NASAL DAILY PRN
Status: DISCONTINUED | OUTPATIENT
Start: 2019-01-21 | End: 2019-01-25 | Stop reason: HOSPADM

## 2019-01-21 RX ORDER — MULTIVITAMIN,THERAPEUTIC
1 TABLET ORAL DAILY
COMMUNITY

## 2019-01-21 RX ORDER — AMOXICILLIN 250 MG
1 CAPSULE ORAL 2 TIMES DAILY
Status: DISCONTINUED | OUTPATIENT
Start: 2019-01-21 | End: 2019-01-25 | Stop reason: HOSPADM

## 2019-01-21 RX ORDER — AMOXICILLIN 250 MG
2 CAPSULE ORAL 2 TIMES DAILY
Status: DISCONTINUED | OUTPATIENT
Start: 2019-01-21 | End: 2019-01-25 | Stop reason: HOSPADM

## 2019-01-21 RX ORDER — ROSUVASTATIN CALCIUM 20 MG/1
40 TABLET, COATED ORAL EVERY EVENING
Status: DISCONTINUED | OUTPATIENT
Start: 2019-01-21 | End: 2019-01-25 | Stop reason: HOSPADM

## 2019-01-21 RX ORDER — NEOSTIGMINE METHYLSULFATE 1 MG/ML
VIAL (ML) INJECTION PRN
Status: DISCONTINUED | OUTPATIENT
Start: 2019-01-21 | End: 2019-01-21

## 2019-01-21 RX ORDER — NALOXONE HYDROCHLORIDE 0.4 MG/ML
.1-.4 INJECTION, SOLUTION INTRAMUSCULAR; INTRAVENOUS; SUBCUTANEOUS
Status: DISCONTINUED | OUTPATIENT
Start: 2019-01-21 | End: 2019-01-21

## 2019-01-21 RX ORDER — DEXAMETHASONE SODIUM PHOSPHATE 4 MG/ML
INJECTION, SOLUTION INTRA-ARTICULAR; INTRALESIONAL; INTRAMUSCULAR; INTRAVENOUS; SOFT TISSUE PRN
Status: DISCONTINUED | OUTPATIENT
Start: 2019-01-21 | End: 2019-01-21

## 2019-01-21 RX ADMIN — CEFAZOLIN SODIUM 2 G: 2 INJECTION, SOLUTION INTRAVENOUS at 10:55

## 2019-01-21 RX ADMIN — CLOPIDOGREL BISULFATE 75 MG: 75 TABLET, FILM COATED ORAL at 16:21

## 2019-01-21 RX ADMIN — SODIUM CHLORIDE: 9 INJECTION, SOLUTION INTRAVENOUS at 17:39

## 2019-01-21 RX ADMIN — HEPARIN SODIUM 6000 UNITS: 1000 INJECTION, SOLUTION INTRAVENOUS; SUBCUTANEOUS at 12:58

## 2019-01-21 RX ADMIN — ROSUVASTATIN CALCIUM 40 MG: 20 TABLET, FILM COATED ORAL at 20:43

## 2019-01-21 RX ADMIN — FENTANYL CITRATE 75 MCG: 50 INJECTION, SOLUTION INTRAMUSCULAR; INTRAVENOUS at 11:24

## 2019-01-21 RX ADMIN — FENTANYL CITRATE 25 MCG: 50 INJECTION, SOLUTION INTRAMUSCULAR; INTRAVENOUS at 14:47

## 2019-01-21 RX ADMIN — LIDOCAINE HYDROCHLORIDE 40 MG: 20 INJECTION, SOLUTION INFILTRATION; PERINEURAL at 11:00

## 2019-01-21 RX ADMIN — ROCURONIUM BROMIDE 30 MG: 10 INJECTION INTRAVENOUS at 11:00

## 2019-01-21 RX ADMIN — CEFAZOLIN SODIUM 1 G: 2 INJECTION, SOLUTION INTRAVENOUS at 14:55

## 2019-01-21 RX ADMIN — PROTAMINE SULFATE 40 MG: 10 INJECTION, SOLUTION INTRAVENOUS at 14:01

## 2019-01-21 RX ADMIN — FENTANYL CITRATE 25 MCG: 50 INJECTION, SOLUTION INTRAMUSCULAR; INTRAVENOUS at 11:00

## 2019-01-21 RX ADMIN — ACETAMINOPHEN 975 MG: 325 TABLET, FILM COATED ORAL at 21:11

## 2019-01-21 RX ADMIN — GLYCOPYRROLATE 0.4 MG: 0.2 INJECTION, SOLUTION INTRAMUSCULAR; INTRAVENOUS at 13:53

## 2019-01-21 RX ADMIN — DEXAMETHASONE SODIUM PHOSPHATE 4 MG: 4 INJECTION, SOLUTION INTRA-ARTICULAR; INTRALESIONAL; INTRAMUSCULAR; INTRAVENOUS; SOFT TISSUE at 11:07

## 2019-01-21 RX ADMIN — SENNOSIDES AND DOCUSATE SODIUM 2 TABLET: 8.6; 5 TABLET ORAL at 20:43

## 2019-01-21 RX ADMIN — CEFAZOLIN SODIUM 1 G: 2 INJECTION, SOLUTION INTRAVENOUS at 12:55

## 2019-01-21 RX ADMIN — LIDOCAINE HYDROCHLORIDE 0.5 ML: 10 INJECTION, SOLUTION EPIDURAL; INFILTRATION; INTRACAUDAL; PERINEURAL at 10:18

## 2019-01-21 RX ADMIN — ASPIRIN 81 MG: 81 TABLET, COATED ORAL at 16:22

## 2019-01-21 RX ADMIN — PROTAMINE SULFATE 10 MG: 10 INJECTION, SOLUTION INTRAVENOUS at 14:06

## 2019-01-21 RX ADMIN — CEFAZOLIN SODIUM 1 G: 1 INJECTION, POWDER, FOR SOLUTION INTRAMUSCULAR; INTRAVENOUS at 21:09

## 2019-01-21 RX ADMIN — FENTANYL CITRATE 25 MCG: 50 INJECTION, SOLUTION INTRAMUSCULAR; INTRAVENOUS at 14:27

## 2019-01-21 RX ADMIN — OXYCODONE HYDROCHLORIDE 5 MG: 5 TABLET ORAL at 17:44

## 2019-01-21 RX ADMIN — SODIUM CHLORIDE, POTASSIUM CHLORIDE, SODIUM LACTATE AND CALCIUM CHLORIDE: 600; 310; 30; 20 INJECTION, SOLUTION INTRAVENOUS at 13:18

## 2019-01-21 RX ADMIN — ONDANSETRON 4 MG: 2 INJECTION INTRAMUSCULAR; INTRAVENOUS at 15:37

## 2019-01-21 RX ADMIN — SODIUM CHLORIDE, POTASSIUM CHLORIDE, SODIUM LACTATE AND CALCIUM CHLORIDE: 600; 310; 30; 20 INJECTION, SOLUTION INTRAVENOUS at 10:18

## 2019-01-21 RX ADMIN — NEOSTIGMINE METHYLSULFATE 3 MG: 1 INJECTION, SOLUTION INTRAVENOUS at 13:56

## 2019-01-21 RX ADMIN — SODIUM CHLORIDE, POTASSIUM CHLORIDE, SODIUM LACTATE AND CALCIUM CHLORIDE: 600; 310; 30; 20 INJECTION, SOLUTION INTRAVENOUS at 16:20

## 2019-01-21 RX ADMIN — PROCHLORPERAZINE EDISYLATE 5 MG: 5 INJECTION INTRAMUSCULAR; INTRAVENOUS at 15:57

## 2019-01-21 RX ADMIN — ONDANSETRON 4 MG: 2 INJECTION INTRAMUSCULAR; INTRAVENOUS at 11:52

## 2019-01-21 RX ADMIN — PROPOFOL 150 MG: 10 INJECTION, EMULSION INTRAVENOUS at 11:00

## 2019-01-21 RX ADMIN — PHENYLEPHRINE HYDROCHLORIDE 0.3 MCG/KG/MIN: 10 INJECTION, SOLUTION INTRAMUSCULAR; INTRAVENOUS; SUBCUTANEOUS at 11:31

## 2019-01-21 ASSESSMENT — MIFFLIN-ST. JEOR: SCORE: 1216.09

## 2019-01-21 ASSESSMENT — ACTIVITIES OF DAILY LIVING (ADL): ADLS_ACUITY_SCORE: 16

## 2019-01-21 NOTE — BRIEF OP NOTE
[facility]    Brief Operative Note    Pre-operative diagnosis: OCCLUDED LEFT fem-SFA PTFE graft  Post-operative diagnosis same  Procedure: Procedure(s):  LEFT FEMORAL TO ABOVE KNEE POPLITEAL  BYPASS WITH POLYTETRAFLUOROETHYLENE GRAFT  Surgeon: Surgeon(s) and Role:     * Shade Owens MD - Primary     * Lejeune, Stacey, MD - Assisting  Anesthesia: General   Estimated blood loss: 100 mL  Drains: None  Specimens: * No specimens in log *  Findings:   Palpable DP and PT at completion of case, 6mm ringed PTFE end to side common-femoral, end to end above knee popliteal.  Complications: None.  Implants: None.

## 2019-01-21 NOTE — OR NURSING
1540 Dr. Owens at bedside.  Bilateral hearing aides and glasses with pt.  Confirmed with Dr. Owens that he wanted both plavix and ASA given at this time  1620 OK with Dr Motley for pt to go upstairs

## 2019-01-21 NOTE — CONSULTS
[facility]    Vascular Medicine Consultation     Date of Admission:  1/21/2019  Date of Consult (When I saw the patient): 01/21/19    Assessment & Plan   1. Recurrent left SFA PTFE graft occlusions now undergoing a left femoral to above knee popliteal artery bypass with PTFE 1/21/19    Post-operative cares per Dr. Owens/Vascular Surgery. She has been on Xarelto and did not miss a dose since her last graft occlusion last month. Antiplatelet/anticoagulation to be resumed post-operatively at the discretion of Vascular Surgery. Given that this has occurred in a PTFE graft even when compliant with Xarelto, consider Lovenox/warfarin upon resumption of therapeutic AC.       2. Hyperlipidemia      Her lipids were not at goal on Crestor 40 daily, and Zetia 5 mg daily back one month ago. Therefore, she increased her Zetia to 10 mg daily. Since then, about 1 week ago, her Crestor dose somehow got reduced to 10 mg daily upon refill by her PCP clinic. Her LDL is still high. Her Crestor dose will be increased back to 40 mg daily. She should have a lipid panel rechecked in 3 months. If her lipids are still not at goal (LDL<70), then she should be [facility] for further recommendations.      3. Depression      This has been stable. Continue outpatient bupropion.      Reason for Consult   Reason for consult: Asked by Dr. Owens to evaluate vascular risk factors and assist with medical management in this 68 year old never smoker, non-diabetic with a history of hyperlipidemia who presents today for a left lower extremity bypass due to repeated occlusions of a prior short PTFE interposition graft.     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 The mass was excised  along with the vein and artery in the left thigh and her SFA was reconstructed utilizing a GoreTex graft. She had been doing well up until early 2018 when she was found to have an occluded graft. She was admitted and catheter-directed lysis was successfully undertaken in addition to placement of a viabahn stent graft in a portion of the mid-graft to reopen the graft. However, she developed bleeding from her access site in the right groin, requiring exploration of the right common femoral artery and right common femoral artery primary repair on 5/1/18. She was initiated on Xarelto upon discharge. She was also noted to have elevated lipids with an LDL of 173 despite being on Atorvastatin 80 mg daily. Her Atorvastatin was changed to Crestor 40 mg daily and Zetia 5 mg daily was added. She was seen by Dr. Owens for a 3 month follow-up at which time her ABIs were normal and she had palpable pulses.  However, she presented to the Vascular Clinic on 12/13/18 with a 1 week history of left lower extremity claudication. Her ABIs were 0.62 at rest on the left, dropping to 0.28 with exercise. Her left SFA had occluded again. She admitted to stopping her Xarelto on her own in early December as she thought she had been on it over 7 months and could stop it. It was a few days after stopping it that she noted her symptoms. Therefore, it was felt that it was not a failure of Xarelto that caused her graft occlusion, but rather her stopping it prematurely which contributed to it. She underwent successful lysis again as well as placement of a second new 6 mm Viabahn stent. She was discharged again on Xarelto 15 mg BID for 21 days, followed by 20 mg daily thereafter. She states that she was 100% compliant with this. However, on 1/13/19 she developed some left leg heaviness and cramping. An ultrasound was obtained and again this demonstrated that her left bypass graft had again occluded. She was now advised to return for surgical  management with a left femoral to above-knee popliteal artery bypass with PTFE.       Past Medical History   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, S/P excision, radiation  at [facility]     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    CHRONIC LEFT THIGH LYMPHEDEMA     Nontoxic multinodular goiter 2005    needs yearly US     Osteoporosis, unspecified 2001     Other lymphedema 2000    left thigh, gets regular PT for this     PAD (peripheral artery disease) (H) 4/20/2018     PONV (postoperative nausea and vomiting)      SHINGLES 2001     Sprain of lumbosacral (joint) (ligament) 1995    right     Unspecified hearing loss 1998    chronic tinnitus     Unspecified tinnitus 1998       Past Surgical History   Past Surgical History:   Procedure Laterality Date     C APPENDECTOMY      Appendectomy     C NONSPECIFIC PROCEDURE  04/2000    Open Biopsy Left Thigh Liposarcoma     COLONOSCOPY N/A 2/5/2016    Procedure: COMBINED COLONOSCOPY, SINGLE OR MULTIPLE BIOPSY/POLYPECTOMY BY BIOPSY;  Surgeon: Varun Stanley MD, MD;  Location: [facility]     EXCISION MALIG LESION>1.25CM  5/2000    Myxoid Liposarcoma       EXPLORE GROIN Right 5/1/2018    Procedure: EXPLORE GROIN;  EMERGENCY RIGHT FEMORAL EXPLORATION WITH FEMORAL ARTERY REPAIR.    EBL: 50mL;  Surgeon: Shade Owens MD;  Location:  OR     HC COLONOSCOPY THRU STOMA, DIAGNOSTIC  2006    due 2010     HC COLP CERVIX/UPPER VAGINA  07/1997    Negative     HC DILATION/CURETTAGE DIAG/THER NON OB  02/1997    Post menopausal bleeding on HRT, negative     IR  ANGIOGRAM THROUGH CATHETER FOLLOW UP  12/20/2018     IR ANGIOGRAM THROUGH CATHETER FOLLOW UP  12/21/2018     IR LOWER EXTREMITY ANGIOGRAM LEFT  12/19/2018     VASCULAR SURGERY  04/30/2018    Left SFA stent in bypass graft       Prior to Admission Medications   Prior to Admission Medications   Prescriptions Last Dose Informant Patient Reported? Taking?   Acetaminophen 325 MG CAPS 1/19/2019 at prn Self Yes Yes   Sig: Take 325-650 mg by mouth 2 times daily as needed   CALCIUM PO 1/20/2019 at am Self Yes Yes   Sig: Take 1 tablet by mouth daily   ORDER FOR DME  Self No No   Sig: Equipment being ordered: lymphedema bandages   alendronate (FOSAMAX) 70 MG tablet 1/7/2019 Self No Yes   Sig: Take 1 tablet (70 mg) by mouth with 8oz water every 7 days 30 minutes before breakfast and remain upright during this time.   Patient taking differently: Take 70 mg by mouth every 7 days Take 1 tablet (70 mg) by mouth with 8oz water every 7 days 30 minutes before breakfast and remain upright during this time.   aspirin 81 MG EC tablet 1/21/2019 at 0630 Self Yes Yes   Sig: Take 81 mg by mouth daily   buPROPion (WELLBUTRIN XL) 300 MG 24 hr tablet 1/21/2019 at 0630 Self No Yes   Sig: Take 1 tablet (300 mg) by mouth every morning   ezetimibe (ZETIA) 10 MG tablet 1/21/2019 at 0630 Self No Yes   Sig: Take 1 tablet (10 mg) by mouth daily   fluticasone (FLONASE) 50 MCG/ACT spray more than a week at prn Self No Yes   Sig: Spray 1-2 sprays into both nostrils daily   Patient taking differently: Spray 1-2 sprays into both nostrils daily as needed    multivitamin, therapeutic (THERA-VIT) TABS tablet 1/20/2019 at am Self Yes Yes   Sig: Take 1 tablet by mouth daily   order for DME  Self No No   Sig: Equipment being ordered: Left Thigh High Compression Stocking, 550 CCL.3   rivaroxaban ANTICOAGULANT (XARELTO) 20 MG TABS tablet 1/18/2019 Self No Yes   Sig: Take 1 tablet (20 mg) by mouth daily (with dinner) Start after completing 21 days of 15 mg PO BID.    Patient taking differently: Take 20 mg by mouth daily (with dinner)    rosuvastatin (CRESTOR) 10 MG tablet 2019 at pm Self Yes Yes   Sig: Take 10 mg by mouth every evening       Facility-Administered Medications: None     Allergies   Allergies   Allergen Reactions     Celexa [Citalopram Hydrobromide]      Decreased libido       Social History   Angeli Jacobson  reports that  has never smoked. she has never used smokeless tobacco. She reports that she does not drink alcohol or use drugs.    Family History   Family History   Problem Relation Age of Onset     C.A.D. Father         MI 57     Alcohol/Drug Father         etoh     Obesity Mother      Osteoporosis Mother      Colon Cancer Brother 70     Hyperlipidemia Son      Hyperlipidemia Son         very high, experimental drug     C.A.D. Paternal Grandmother         ascvd     Diabetes Maternal Grandmother      Cancer Maternal Grandmother      C.A.D. Paternal Uncle         Mi  age 48     Cancer Maternal Aunt         pancreatic CA       Review of Systems   The 10 point Review of Systems is negative other than noted in the HPI or here.     Physical Exam   Temp: 98.3  F (36.8  C) Temp src: Temporal BP: 145/88 Pulse: 80 Heart Rate: 80 Resp: 17 SpO2: 97 % O2 Device: None (Room air)    Vital Signs with Ranges  Temp:  [98.3  F (36.8  C)] 98.3  F (36.8  C)  Pulse:  [80] 80  Heart Rate:  [80] 80  Resp:  [17] 17  BP: (145)/(88) 145/88  SpO2:  [97 %] 97 %  147 lbs 9 oz    Constitutional: awake, alert, cooperative, no apparent distress, and appears stated age  Eyes: Lids and lashes normal, pupils equal, round and reactive to light, extra ocular muscles intact, sclera clear, conjunctiva normal  ENT: normocepalic, without obvious abnormality, oropharynx pink and moist  Hematologic / Lymphatic: no lymphadenopathy  Respiratory: No increased work of breathing, good air exchange, clear to auscultation bilaterally, no crackles or wheezing  Cardiovascular: regular rate  and rhythm, normal S1 and S2 and no murmur noted  GI: Normal bowel sounds, soft, non-distended, non-tender  Skin: no redness, warmth, or swelling, no rashes  Musculoskeletal: There is no redness, warmth. Some lymphedema noted in left leg.  Full range of motion noted.  Motor strength is 5 out of 5 all extremities bilaterally.  Tone is normal.  Neurologic: Awake, alert, oriented to name, place and time.  Cranial nerves II-XII are grossly intact.  Motor is 5 out of 5 bilaterally.    Neuropsychiatric:  Normal affect, memory, insight.       Data   Most Recent 3 CBC's:  Recent Labs   Lab Test 12/22/18  0530 12/21/18  1846 12/21/18  0600   WBC 7.5 7.3 6.2   HGB 12.8 13.4 12.6   MCV 92 93 92    146* 151     Most Recent 3 BMP's:  Recent Labs   Lab Test 12/22/18  0530 12/21/18  0600 12/20/18 2003    142 143   POTASSIUM 3.5 3.4 3.5   CHLORIDE 108 111* 110*   CO2 26 24 25   BUN 7 5* 6*   CR 0.70 0.58 0.53   ANIONGAP 9 7 8   LONG 8.1* 7.9* 8.1*   GLC 95 88 158*     Most Recent 3 INR's:  Recent Labs   Lab Test 12/19/18  1241 05/01/18  1700 04/30/18  0740   INR 0.97 1.43* 0.96     Most Recent Cholesterol Panel:  Recent Labs   Lab Test 01/17/19  0904   CHOL 198   *   HDL 72   TRIG 93     Most Recent Hemoglobin A1c:  Recent Labs   Lab Test 01/17/19  0904   A1C 5.5

## 2019-01-21 NOTE — ANESTHESIA CARE TRANSFER NOTE
Patient: Angeli Jacobson    Procedure(s):  LEFT FEMORAL TO ABOVE KNEE POPLITEAL  BYPASS WITH POLYTETRAFLUOROETHYLENE GRAFT    Diagnosis: OCCLUDED LEFT LEG POLYTETRAFLUOROETHYLENE GRAFT  Diagnosis Additional Information: No value filed.    Anesthesia Type:   General, ETT     Note:  Airway :Face Mask  Patient transferred to:PACU  Handoff Report: Identifed the Patient, Identified the Reponsible Provider, Reviewed the pertinent medical history, Discussed the surgical course, Reviewed Intra-OP anesthesia mangement and issues during anesthesia, Set expectations for post-procedure period and Allowed opportunity for questions and acknowledgement of understanding      Vitals: (Last set prior to Anesthesia Care Transfer)    CRNA VITALS  1/21/2019 1435 - 1/21/2019 1512      1/21/2019             Pulse:  98    Ht Rate:  96    SpO2:  98 %    Resp Rate (observed):  6  (Abnormal)     Resp Rate (set):  10    EKG:  NSR                Electronically Signed By: ELVA Liu CRNA  January 21, 2019  3:12 PM

## 2019-01-21 NOTE — PROGRESS NOTES
Admission medication history interview status for the 1/21/2019  admission is complete. See EPIC admission navigator for prior to admission medications     Medication history source reliability:Good    Medication history interview source(s):Patient    Medication history resources (including written lists, pill bottles, clinic record):None    Primary pharmacy.[facility]    Additional medication history information not noted on PTA med list :None    Time spent in this activity: 45 minutes    Prior to Admission medications    Medication Sig Last Dose Taking? Auth Provider   Acetaminophen 325 MG CAPS Take 325-650 mg by mouth 2 times daily as needed 1/19/2019 at prn Yes Reported, Patient   alendronate (FOSAMAX) 70 MG tablet Take 1 tablet (70 mg) by mouth with 8oz water every 7 days 30 minutes before breakfast and remain upright during this time.  Patient taking differently: Take 70 mg by mouth every 7 days Take 1 tablet (70 mg) by mouth with 8oz water every 7 days 30 minutes before breakfast and remain upright during this time. 1/7/2019 Yes Sepideh Burris PA-C   aspirin 81 MG EC tablet Take 81 mg by mouth daily 1/21/2019 at 0630 Yes Reported, Patient   buPROPion (WELLBUTRIN XL) 300 MG 24 hr tablet Take 1 tablet (300 mg) by mouth every morning 1/21/2019 at 0630 Yes Sepideh Burris PA-C   CALCIUM PO Take 1 tablet by mouth daily 1/20/2019 at am Yes Reported, Patient   ezetimibe (ZETIA) 10 MG tablet Take 1 tablet (10 mg) by mouth daily 1/21/2019 at 0630 Yes Sepideh Burris PA-C   fluticasone (FLONASE) 50 MCG/ACT spray Spray 1-2 sprays into both nostrils daily  Patient taking differently: Spray 1-2 sprays into both nostrils daily as needed  more than a week at prn Yes Sepideh Burris PA-C   multivitamin, therapeutic (THERA-VIT) TABS tablet Take 1 tablet by mouth daily 1/20/2019 at am Yes Reported, Patient   rivaroxaban ANTICOAGULANT (XARELTO) 20 MG TABS tablet Take 1 tablet (20 mg) by mouth daily (with dinner)  Start after completing 21 days of 15 mg PO BID.  Patient taking differently: Take 20 mg by mouth daily (with dinner)  1/18/2019 Yes Ramses Benitez MD   rosuvastatin (CRESTOR) 10 MG tablet Take 10 mg by mouth every evening  1/20/2019 at pm Yes Rupal Frankel MD   order for DME Equipment being ordered: Left Thigh High Compression Stocking, 550 CCL.3   Yanelis Bailey MD   ORDER FOR DME Equipment being ordered: lymphedema bandages   Yanelis Bailey MD

## 2019-01-21 NOTE — LETTER
Transition Communication Hand-off for Care Transitions to Next Level of Care Provider    Name: Angeli Jacobson  : 1950  MRN #: [id number]  Primary Care Provider: Sepideh Burris  Primary Care MD Name: Sepideh Dayton  Primary Clinic: [address] [facility] [address]  Primary Care Clinic Name: [facility]  Reason for Hospitalization:  OCCLUDED LEFT LEG POLYTETRAFLUOROETHYLENE GRAFT  History of sarcoma  Admit Date/Time: 2019  8:37 AM  Discharge Date: 19  Payor Source: Payor: [facility] / Plan: [facility] MEDICARE ADVANTAGE / Product Type: Medicare /     Readmission Assessment Measure (RAMO) Risk Score/category: Elevated           Reason for Communication Hand-off Referral: Other elevated RAMO    Discharge Plan: discharge to home with .  discharge on ASA alone.   Plans to live on 1 level of house, has leg assist.  Planned travel to [place] Feb 3-13th pending MD approval       Concern for non-adherence with plan of care:   Y/N N  Discharge Needs Assessment:  Needs      Most Recent Value   # of Referrals Placed by CTS  Scheduled Follow-up appointments, Communication hand-offs to next level of Care Providers          Follow-up specialty is recommended: No    Follow-up plan:    Future Appointments   Date Time Provider Department Center   2019 10:30 AM Sepideh Burris, PA-C [facility]       Any outstanding tests or procedures:              Key Recommendations:  Home with spouse assist.  Follow up with surgeon.  PCP appt made.    Coby Ndiaye    AVS/Discharge Summary is the source of truth; this is a helpful guide for improved communication of patient story

## 2019-01-22 LAB
ANION GAP SERPL CALCULATED.3IONS-SCNC: 5 MMOL/L (ref 3–14)
BASOPHILS # BLD AUTO: 0 10E9/L (ref 0–0.2)
BASOPHILS NFR BLD AUTO: 0.4 %
BUN SERPL-MCNC: 5 MG/DL (ref 7–30)
CALCIUM SERPL-MCNC: 7.8 MG/DL (ref 8.5–10.1)
CHLORIDE SERPL-SCNC: 109 MMOL/L (ref 94–109)
CO2 SERPL-SCNC: 27 MMOL/L (ref 20–32)
CREAT SERPL-MCNC: 0.58 MG/DL (ref 0.52–1.04)
DIFFERENTIAL METHOD BLD: NORMAL
EOSINOPHIL # BLD AUTO: 0 10E9/L (ref 0–0.7)
EOSINOPHIL NFR BLD AUTO: 0 %
ERYTHROCYTE [DISTWIDTH] IN BLOOD BY AUTOMATED COUNT: 13.1 % (ref 10–15)
GFR SERPL CREATININE-BSD FRML MDRD: >90 ML/MIN/{1.73_M2}
GLUCOSE SERPL-MCNC: 99 MG/DL (ref 70–99)
HCT VFR BLD AUTO: 35.7 % (ref 35–47)
HGB BLD-MCNC: 11.7 G/DL (ref 11.7–15.7)
IMM GRANULOCYTES # BLD: 0 10E9/L (ref 0–0.4)
IMM GRANULOCYTES NFR BLD: 0.2 %
LYMPHOCYTES # BLD AUTO: 1.6 10E9/L (ref 0.8–5.3)
LYMPHOCYTES NFR BLD AUTO: 28.8 %
MCH RBC QN AUTO: 30.2 PG (ref 26.5–33)
MCHC RBC AUTO-ENTMCNC: 32.8 G/DL (ref 31.5–36.5)
MCV RBC AUTO: 92 FL (ref 78–100)
MONOCYTES # BLD AUTO: 0.6 10E9/L (ref 0–1.3)
MONOCYTES NFR BLD AUTO: 9.8 %
NEUTROPHILS # BLD AUTO: 3.5 10E9/L (ref 1.6–8.3)
NEUTROPHILS NFR BLD AUTO: 60.8 %
NRBC # BLD AUTO: 0 10*3/UL
NRBC BLD AUTO-RTO: 0 /100
PLATELET # BLD AUTO: 163 10E9/L (ref 150–450)
POTASSIUM SERPL-SCNC: 3.6 MMOL/L (ref 3.4–5.3)
RBC # BLD AUTO: 3.88 10E12/L (ref 3.8–5.2)
SODIUM SERPL-SCNC: 141 MMOL/L (ref 133–144)
WBC # BLD AUTO: 5.7 10E9/L (ref 4–11)

## 2019-01-22 PROCEDURE — 25000128 H RX IP 250 OP 636: Performed by: SURGERY

## 2019-01-22 PROCEDURE — 25000132 ZZH RX MED GY IP 250 OP 250 PS 637: Performed by: SURGERY

## 2019-01-22 PROCEDURE — 82947 ASSAY GLUCOSE BLOOD QUANT: CPT | Performed by: PHYSICIAN ASSISTANT

## 2019-01-22 PROCEDURE — 99233 SBSQ HOSP IP/OBS HIGH 50: CPT | Performed by: INTERNAL MEDICINE

## 2019-01-22 PROCEDURE — 25000132 ZZH RX MED GY IP 250 OP 250 PS 637: Performed by: PHYSICIAN ASSISTANT

## 2019-01-22 PROCEDURE — 85025 COMPLETE CBC W/AUTO DIFF WBC: CPT | Performed by: SURGERY

## 2019-01-22 PROCEDURE — 12000000 ZZH R&B MED SURG/OB

## 2019-01-22 PROCEDURE — 36415 COLL VENOUS BLD VENIPUNCTURE: CPT | Performed by: SURGERY

## 2019-01-22 PROCEDURE — 80048 BASIC METABOLIC PNL TOTAL CA: CPT | Performed by: SURGERY

## 2019-01-22 RX ORDER — VENLAFAXINE HYDROCHLORIDE 37.5 MG/1
37.5 CAPSULE, EXTENDED RELEASE ORAL
Status: DISCONTINUED | OUTPATIENT
Start: 2019-01-23 | End: 2019-01-25 | Stop reason: HOSPADM

## 2019-01-22 RX ORDER — HEPARIN SODIUM 5000 [USP'U]/.5ML
5000 INJECTION, SOLUTION INTRAVENOUS; SUBCUTANEOUS EVERY 12 HOURS
Status: DISCONTINUED | OUTPATIENT
Start: 2019-01-22 | End: 2019-01-25 | Stop reason: HOSPADM

## 2019-01-22 RX ADMIN — HEPARIN SODIUM 5000 UNITS: 5000 INJECTION, SOLUTION INTRAVENOUS; SUBCUTANEOUS at 08:28

## 2019-01-22 RX ADMIN — ASPIRIN 81 MG: 81 TABLET, COATED ORAL at 08:30

## 2019-01-22 RX ADMIN — SENNOSIDES AND DOCUSATE SODIUM 2 TABLET: 8.6; 5 TABLET ORAL at 08:29

## 2019-01-22 RX ADMIN — HEPARIN SODIUM 5000 UNITS: 5000 INJECTION, SOLUTION INTRAVENOUS; SUBCUTANEOUS at 20:56

## 2019-01-22 RX ADMIN — EZETIMIBE 10 MG: 10 TABLET ORAL at 08:30

## 2019-01-22 RX ADMIN — CEFAZOLIN SODIUM 1 G: 1 INJECTION, POWDER, FOR SOLUTION INTRAMUSCULAR; INTRAVENOUS at 06:31

## 2019-01-22 RX ADMIN — CLOPIDOGREL BISULFATE 75 MG: 75 TABLET, FILM COATED ORAL at 08:30

## 2019-01-22 RX ADMIN — ACETAMINOPHEN 975 MG: 325 TABLET, FILM COATED ORAL at 21:01

## 2019-01-22 RX ADMIN — BUPROPION HYDROCHLORIDE 300 MG: 300 TABLET, FILM COATED, EXTENDED RELEASE ORAL at 08:30

## 2019-01-22 RX ADMIN — ACETAMINOPHEN 975 MG: 325 TABLET, FILM COATED ORAL at 06:36

## 2019-01-22 RX ADMIN — ROSUVASTATIN CALCIUM 40 MG: 20 TABLET, FILM COATED ORAL at 21:00

## 2019-01-22 RX ADMIN — SODIUM CHLORIDE: 9 INJECTION, SOLUTION INTRAVENOUS at 02:52

## 2019-01-22 RX ADMIN — SENNOSIDES AND DOCUSATE SODIUM 2 TABLET: 8.6; 5 TABLET ORAL at 21:00

## 2019-01-22 ASSESSMENT — ACTIVITIES OF DAILY LIVING (ADL)
ADLS_ACUITY_SCORE: 14
ADLS_ACUITY_SCORE: 16

## 2019-01-22 NOTE — PLAN OF CARE
A&Ox4. VSS on RA. L/s clear. Dried drainage to left upper thigh and groin [id number]. CMS intacte, pulses +2 w/ doppler. Able to wiggles toes. LLE remained elevated in bed. Denies Numbness/tingling. Denies pain. No N&V. Advanced to regular diet this AM. Hypoactive BS. No flatus yet. Eli w/ good UOP-removed this AM at 0640. Pt due to void. Discharge pending progress.

## 2019-01-22 NOTE — PROGRESS NOTES
HOSPITALIST CONSULT CHART CHECK:     Hospitalist service was consulted for cross coverage only. We will peripherally follow and chart check throughout the week.      - For vascular medical concerns during business hours M-F, call the [facility] at [phone] to have the rounding/on call Vascular Medicine (NOT VASCULAR SURGERY) MD paged.     - After business hours M-F, for medical concerns on this patient, please page hospitalist staff.     - For vascular surgical questions, please page the appropriate surgeon (primary vascular surgeon or on call vascular surgeon) based upon the time of day.

## 2019-01-22 NOTE — PLAN OF CARE
Pt. Arrived from PACU around 17:00. A&O, VSS on RA. Lung sounds clear, Bowel sounds hypoactive, -flatus. Benitez patent and in place w/ adeqaute urine output. Left leg ace bandaged. Pulses per doppler. Baseline numbness in left leg below the knee. Dangled at bedside. Tolerating clear liquid diet. Pain controlled by scheduled tylenol. Oxycodone given x1. Denies nausea. NS running @100ml/hr.

## 2019-01-22 NOTE — OP NOTE
Procedure Date: 01/21/2019      PREOPERATIVE DIAGNOSIS:  Occluded left proximal superficial femoral to mid superficial femoral artery PTFE interposition graft.      POSTOPERATIVE DIAGNOSIS:  Occluded left proximal superficial femoral to mid superficial femoral artery PTFE interposition graft.      PROCEDURES PERFORMED:  Left common femoral to above-knee popliteal artery bypass with PTFE graft.      SURGEON:  Elijah Owens MD      :  Stacey Lejeune.      ANESTHESIA:  General endotracheal anesthesia.      ESTIMATED BLOOD LOSS:  Less than 100 mL.      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  That graft had functioned well until 04/2018, when she presented with a subacute thrombosis.  She underwent successful thrombolysis of the graft and was empirically started  on Xarelto.  She did well for 8 months and then stopped her Xarelto on her own.  Within 5 days, that graft re-occluded.  In late December, we underwent a second successful thrombolysis of the graft and she had preserved 3-vessel runoff to the foot with palpable pedal pulses.  She has been maintained on Xarelto and aspirin ever since.  Despite this, the graft is rethrombosed now for a third time.  I feel that further attempts at thrombolysis are not warranted.  The plan for today is therefore to redo her femoral interposition graft.  Of note, at the original sarcoma resection, she underwent a left groin lymphadenectomy.  She suffers from moderate lymphedema as a result and wears a thigh-high compression stocking of 30-40 mmHg pressure on a daily basis.      OPERATIVE FINDINGS:  We performed a limited dissection to expose only the left common femoral artery from the inguinal ligament down to the femoral bifurcation.  We based our  PTFE graft in an end-to-side manner off of this.  We attempted to limit our groin dissection hoping to minimize any worsening of the lymphedema.  Preprocedurally, we had utilized a portable ultrasound to milana the location of the end of the thrombosed interposition graft.  We had hoped to perform a limited cutdown at this level, which was mid-thigh.  Unfortunately, when we began that exposure, there was significant scar tissue related to her prior left thigh irradiation.  The sartorius muscle was encased in fibrous white scar and there were simply no planes of dissection.  We could not isolate the mid-thigh superficial femoral artery due to the scarring.  We therefore extended our incision for another 4 or 5 inches down the medial thigh and ultimately were able to easily dissect out the left above-knee popliteal artery.  That vessel was divided at the adductor canal and ligated proximally.  We then performed an end-to-end anastomosis to the above-knee popliteal artery utilizing our 6 mm ringed PTFE graft.  At the completion of this procedure, this patient did have easily palpable left-sided dorsalis pedis and posterior tibial pulses.      DESCRIPTION OF TECHNIQUE:  After informed consent was obtained, the patient was brought to the operating room and placed on the table in a supine position, after which general endotracheal anesthesia was achieved without incident.  Her left groin and her entire left lower extremity were prepped and draped sterilely, taking great care not to touch skin.  We utilized Ioban impermeable drapes.  Timeout was called and we verified the patient's identity, the operative site, and the proposed procedure.      We began making a short incision in the left groin through the old surgical scar.  Dissection proceeded sharply downward to isolate the left common femoral artery at the level of the inguinal ligament.  It was encircled with a vessel loop.  Dissection continued distally for about 3 cm  to the femoral bifurcation.  The distal left common femoral artery was encircled with another vessel loop.  Ancef soaked gauze was then placed into this incision.  Next, we made somewhat of an oblique incision based along the lateral border of the sartorius muscle at the level that we intended to expose the SFA at mid-thigh.  Dissection proceeded sharply downward to isolate what we thought was the sartorius muscle.  It was encased in a fibrous white scar tissue clearly related to her prior irradiation.  We spent considerable time trying to mobilize what we thought was the sartorius muscle and to develop the plane of dissection down to the fascial level overlying the superficial femoral vessels.  We could not palpate a pulse, as the proximal graft was thrombosed.  We were able to Doppler a pulse, but we were uncertain whether this was the superficial femoral artery or a profunda branch.  At this point, I made the decision to extend our skin incision distally into a non-irradiated surgical field.  Our skin incision was extended about 4 inches distally down to the level of the mid above-knee popliteal artery.  Dissection proceeded sharply downward and the sartorius muscle was retracted posteriorly.  Even this dissection was a bit difficult due to the size of this patient's leg and her chronic lymphedema.  Ultimately, we isolated the right above-knee popliteal artery at the level of the adductor canal.  This was dissected free for 3 cm and controlled proximally and distally with vessel loops.  I then attempted to make a subsartorial tunnel to draw a 6 mm PTFE graft through.  There was simply no plane to pass the tunneler and I aborted this effort.  I subsequently passed the tunneler subcutaneously to our groin incision.  A 6 mm ringed PTFE graft was drawn through this tunnel.  The patient was given 6000 units of intravenous heparin.  Proximal and distal control was achieved on the above-knee popliteal artery and the  artery was transected.  The proximal remnant had only a trickle of flow within it, and this was oversewn with a 2-0 silk stick tie.  We had a good back bleeding coming from the transected above-knee popliteal artery, but we did pass a #3 Evin catheter down into the calf and had a negative pass.  Heparinized saline was instilled down the popliteal artery and the graft was occluded with a Gonzalez Goel C clamp.  Our ringed 6 mm PTFE graft was cut in a slightly spatulated manner.  We proceeded with an end-to-end 6 mm ringed PTFE to above-knee popliteal artery anastomosis using running 6-0 Prolene suture.  That anastomosis was subsequently secured.  Our distal clamp was released and we had excellent backbleeding up and out the graft in the groin.  Heparinized saline was instilled back down the graft and control of the above-knee popliteal artery distally was obtained using a vessel loop.  Next,  proximal and distal control was achieved on the exposed left common femoral artery.  A roughly 12 mm arteriotomy was made and we incised a thin rim of the arterial wall.  Our ringed 6 mm PTFE graft was cut in an appropriately spatulated manner.  We proceeded with an end-to-side 6 mm PTFE to mid left common femoral artery anastomosis using running 5-0 Prolene suture.  Prior to securing the suture line, all clamps and vessel loops were briefly released to flush any debris out of the graft lumen.  The graft was irrigated with heparinized saline and found to be clear.  We completed the remaining stitches in the suture line.  This femoral anastomosis was subsequently secured.  Flow was restored first down the left profunda femoral artery before relinquishing control of the above-knee popliteal artery and restoring flow down the graft.  Immediately noted was significant needle hole bleeding coming from both the proximal and distal anastomoses.  The patient was given 50 mg of protamine.  Surgicel gauze and gentle compression were  held over the areas for several minutes.  After about 10 or 15 minutes, we did have satisfactory hemostasis.  Both incisions were copiously irrigated with Ancef irrigation.  Both incisions were then meticulously closed using interrupted absorbable suture.  Skin was closed with interrupted 3-0 nylon vertical mattress sutures.  Sterile dressings were applied.  Final sponge and needle count were reported as correct.  She had easily palpable left-sided dorsalis pedis and posterior tibial pulses.  I do have concerns for worsening postoperative left leg lymphedema.  We therefore chose to wrap the entire left leg from the forefoot to the proximal thigh using Kerlix and a double 6-inch Ace wrap.  Mrs. Elaine tolerated this procedure without incident.  She was extubated and returned hemodynamically stable to the recovery room.  Once again, in the recovery room, she is noted to have easily palpable left-sided pedal pulses.  We will continue to elevate this left leg and watch for worsening left leg lymphedema.  I will have a discussion with my Vascular Medicine colleagues about postoperatively maintaining her either on dual antiplatelet therapy or perhaps back on Xarelto.         SHADE CEDILLO MD             D: 2019   T: 2019   MT: SHERWIN      Name:     DIANA ELAINE   MRN:      [id number]        Account:        [id number]   :      1950           Procedure Date: 2019      Document: [id number]       cc: Shade Cedillo MD

## 2019-01-22 NOTE — ANESTHESIA POSTPROCEDURE EVALUATION
Patient: Angeli Jacobson    Procedure(s):  LEFT FEMORAL TO ABOVE KNEE POPLITEAL  BYPASS WITH POLYTETRAFLUOROETHYLENE GRAFT    Diagnosis:OCCLUDED LEFT LEG POLYTETRAFLUOROETHYLENE GRAFT  Diagnosis Additional Information: No value filed.    Anesthesia Type:  General, ETT    Note:  Anesthesia Post Evaluation    Patient location during evaluation: PACU  Patient participation: Able to fully participate in evaluation  Level of consciousness: awake  Pain management: adequate  Airway patency: patent  Cardiovascular status: acceptable  Respiratory status: acceptable  Hydration status: acceptable  PONV: controlled     Anesthetic complications: None          Last vitals:  Vitals:    01/21/19 1703 01/21/19 1737 01/21/19 1820   BP: 121/68 110/67 118/73   Pulse:  75 75   Resp:  10 13   Temp: 36.6  C (97.9  F) 36.4  C (97.5  F) 36.6  C (97.9  F)   SpO2: 97% 97% 98%         Electronically Signed By: Melina Motley MD  January 21, 2019  7:30 PM

## 2019-01-22 NOTE — PROGRESS NOTES
Vascular Surgery Progress Note    Overall doing well, pain controlled.  No complaints this morning  Good UOP, only mild leg swelling.    B/P: 130/73, T: 97.8, P: 75, R: 16  Alert oriented no acute distress  Left leg ACE in place, motor intact  Mild serosang drainage on proximal portion of wound  Mild edema, foot warm and palpable pedal pulses.    WBC   Date Value Ref Range Status   12/22/2018 7.5 4.0 - 11.0 10e9/L Final   ]  Hemoglobin   Date Value Ref Range Status   12/22/2018 12.8 11.7 - 15.7 g/dL Final   ]  INR   Date Value Ref Range Status   12/19/2018 0.97 0.86 - 1.14 Final      Creatinine   Date Value Ref Range Status   12/22/2018 0.70 0.52 - 1.04 mg/dL Final   ]      Intake/Output Summary (Last 24 hours) at 1/22/2019 0718  Last data filed at 1/22/2019 0642  Gross per 24 hour   Intake 3572 ml   Output 3250 ml   Net 322 ml       Assessment/Plan:  68 year old female POD#1 s/p Femoral-above knee pop bypass with PTFE, history of occluded femoral bypass and lymphedema due to prior sarcoma resection and radiation.    Continue leg elevation and ACE,  Will rewrap ACE later today if patient agreeable.  She feels it is appropriately snug right now and did not want me to rewrap it.  Ok for bathroom privileges today, no prolonged sitting or standing.  Continue pulse checks   Aspirin, plavix and statin (discussed w/ Dr. Owens and plan to discuss with vascular medicine regarding final anticoagulation plan).  Discontinue thompson, IVF saline locked  Diet as tolerated  subcutaneous heparin for DVT prophylaxis.  Dispo planning to home in the next few days.    Dara Garcia MD  Vascular Surgery Fellow  Pager [phone]

## 2019-01-22 NOTE — PLAN OF CARE
A&Ox4.  VSS.  Afebrile.  Up with 1 to the bathroom.  Ambulated in hallway.  CMS intact.  Dressing to left leg intact with dried drainage at thigh and groin sites (dressing marked-no change).  Pulses dopplerable.  Able to wiggle toes.  LLE elevated in bed.  Tolerating regular diet.  BS hypoactive.  Denies passing gas.  Voiding in bathroom.  No c/o n/v.  Having 0-1/10 pain.  Declined scheduled Tylenol.  Discharge pending progress.

## 2019-01-22 NOTE — PROGRESS NOTES
"SPIRITUAL HEALTH SERVICES Progress Note  [facility]     visited by patient request.  Patient's  also visiting.  Patient hopeful that she's nearing the end of a several month process now that she's had this surgery.  Patient and  are thankful for prayers and support from family and friends, including at \"new\" [facility] in [place], and their previous Worship, and for God's blessing over them through this process.  They report seeing God's influence in the timing of recent events, including 's assisted.     listened empathically as patient and  told about their experience, and provided prayer.    Patient seems to be coping well, optimistic and well supported by family and friends.    No plan to follow up, but patient and  know that SH remains available by request.      Eugenio Brand   Intern  "

## 2019-01-22 NOTE — PROGRESS NOTES
[facility]    Vascular Medicine Progress Note    Date of Service (when I saw the patient): 01/22/2019          Physician Supervisory Attestation:   I have reviewed and discussed with the physician assistant their history, physical and plan and independently interviewed and examined Angeli Jacobson and agree with the plan as stated in the physician assistant note.    Doing well sitting in chair, pain well controlled, minimal bleed from groin area.  She is on asa and Plavix currently  Before admission taking xarelto ?  PTFE Occluded while on Xarelto   Will discuss with Dr. Owens regarding ASA/plavix VS warfarin and antiplatelet med  Change welbutrin to Effexor XR 37.5 daily for 4 weeks then increase to 75 daily  Elevate leg  Continue crestor 40 and increase zetia to 10 mg    Patient care time spent 35 minutes today    Tiesha Prince MD,[facility]  Vascular Medicine   1/22/2019            Assessment & Plan   1. Recurrent left SFA PTFE graft occlusions now s/p left femoral to above knee popliteal artery bypass with PTFE 1/21/19     Assessment: Doing well. Pain controlled. Ambulating some.     Plan:   -Post-operative cares per Dr. Owens/Vascular Surgery.   -Back on aspirin. Started on Plavix. Will discuss with Dr. Owens if patient still needing anticoagulation.  -If she still requires anticoagulation, consider switching over to Coumadin.   -Ambulate.       2. Hyperlipidemia      Assessment:  -Not at goal on Crestor 40 mg and Zetia 5 mg daily -> Zetia increased to 10 mg daily.  -However, somehow upon refill of her Crestor 2 weeks ago dose was changed to 10 mg daily.     Plan:   -Increase Crestor back to 40 mg daily and continue Zetia 10 mg daily.   -Repeat lipid panel in 3 months through PCP. If her lipids are still not at goal (LDL<70), then she should be [facility] for further recommendations.      3. Depression      Assessment: This has been stable. She had been  on bupropion for decades and is unsure if this is doing much for her. Her depression has been very mild.     Plan: She was initiated on Plavix post-operatively. This decreases the effectiveness of the bupropion. Therefore, the bupropion will be switched to Effexor 37.5 mg daily. After a month, this can be increased to 75 mg daily.           Interval History   Pain under control. Small amount of drainage from groin incision this morning. Voiding. Walking some to bathroom.     Physical Exam   Temp: 98.2  F (36.8  C) Temp src: Oral BP: 112/67 Pulse: 75 Heart Rate: 76 Resp: 18 SpO2: 98 % O2 Device: None (Room air) Oxygen Delivery: 8 LPM  Vitals:    01/21/19 0929   Weight: 66.9 kg (147 lb 9 oz)     Vital Signs with Ranges  Temp:  [97.5  F (36.4  C)-99  F (37.2  C)] 98.2  F (36.8  C)  Pulse:  [75-89] 75  Heart Rate:  [75-87] 76  Resp:  [8-21] 18  BP: (101-130)/(63-81) 112/67  SpO2:  [94 %-98 %] 98 %  I/O last 3 completed shifts:  In: 3572 [P.O.:200; I.V.:3372]  Out: 3250 [Urine:3150; Blood:100]    Constitutional: Awake, alert, cooperative, no apparent distress, and appears stated age.  Eyes: Lids and lashes normal, sclera clear, conjunctiva normal.  ENT: Normocephalic, without obvious abnormality, atraumatic, oral pharynx with moist mucus membranes  Respiratory: No increased work of breathing, good air exchange, clear to auscultation bilaterally, no crackles or wheezing.  Cardiovascular: Regular rate and rhythm, normal S1 and S2, no S3 or S4, and no murmur noted.  GI: Normal bowel sounds, soft, non-distended, non-tender  Skin: No bruising or bleeding, normal skin color, texture, turgor, no redness, warmth, or swelling. Some bloody drainage noted from surgical incision in groin, but dry now.   Musculoskeletal: There is no redness, warmth, or swelling of the joints. LLE lymphedema - chronic.   Neurologic: Awake, alert, oriented to name, place and time.  Cranial nerves II-XII are grossly intact.    Neuropsychiatric: Calm,  normal eye contact, alert, normal affect, oriented to self, place, time and situation, memory for past and recent events intact and thought process normal.    Medications     nitroPRUsside (NIPRIDE) IV infusion ADULT/PEDS GREATER than or EQUAL to 45 kg std conc         acetaminophen  975 mg Oral Q8H     aspirin  81 mg Oral Daily     clopidogrel  75 mg Oral Daily     ezetimibe  10 mg Oral Daily     heparin  5,000 Units Subcutaneous Q12H     rosuvastatin  40 mg Oral QPM     senna-docusate  1 tablet Oral BID    Or     senna-docusate  2 tablet Oral BID     sodium chloride (PF)  3 mL Intracatheter Q8H     [START ON 1/23/2019] venlafaxine  37.5 mg Oral Daily with breakfast       Data   Recent Labs   Lab 01/22/19  0724   WBC 5.7   HGB 11.7   MCV 92         POTASSIUM 3.6   CHLORIDE 109   CO2 27   BUN 5*   CR 0.58   ANIONGAP 5   LONG 7.8*   GLC 99     No results found for this or any previous visit (from the past 24 hour(s)).

## 2019-01-23 LAB
GLUCOSE BLDC GLUCOMTR-MCNC: 109 MG/DL (ref 70–99)
INTERPRETATION ECG - MUSE: NORMAL

## 2019-01-23 PROCEDURE — 12000000 ZZH R&B MED SURG/OB

## 2019-01-23 PROCEDURE — 25000132 ZZH RX MED GY IP 250 OP 250 PS 637: Performed by: PHYSICIAN ASSISTANT

## 2019-01-23 PROCEDURE — 25000132 ZZH RX MED GY IP 250 OP 250 PS 637: Performed by: HOSPITALIST

## 2019-01-23 PROCEDURE — 25000132 ZZH RX MED GY IP 250 OP 250 PS 637: Performed by: SURGERY

## 2019-01-23 PROCEDURE — 99233 SBSQ HOSP IP/OBS HIGH 50: CPT | Performed by: INTERNAL MEDICINE

## 2019-01-23 PROCEDURE — 00000146 ZZHCL STATISTIC GLUCOSE BY METER IP

## 2019-01-23 PROCEDURE — 25000128 H RX IP 250 OP 636: Performed by: SURGERY

## 2019-01-23 RX ORDER — BISACODYL 10 MG
10 SUPPOSITORY, RECTAL RECTAL DAILY PRN
Status: DISCONTINUED | OUTPATIENT
Start: 2019-01-23 | End: 2019-01-25 | Stop reason: HOSPADM

## 2019-01-23 RX ADMIN — EZETIMIBE 10 MG: 10 TABLET ORAL at 08:02

## 2019-01-23 RX ADMIN — HEPARIN SODIUM 5000 UNITS: 5000 INJECTION, SOLUTION INTRAVENOUS; SUBCUTANEOUS at 20:15

## 2019-01-23 RX ADMIN — Medication 10 MG: at 19:30

## 2019-01-23 RX ADMIN — VENLAFAXINE HYDROCHLORIDE 37.5 MG: 37.5 CAPSULE, EXTENDED RELEASE ORAL at 08:02

## 2019-01-23 RX ADMIN — ASPIRIN 81 MG: 81 TABLET, COATED ORAL at 08:02

## 2019-01-23 RX ADMIN — FLUTICASONE PROPIONATE 2 SPRAY: 50 SPRAY, METERED NASAL at 22:28

## 2019-01-23 RX ADMIN — ACETAMINOPHEN 975 MG: 325 TABLET, FILM COATED ORAL at 22:28

## 2019-01-23 RX ADMIN — ROSUVASTATIN CALCIUM 40 MG: 20 TABLET, FILM COATED ORAL at 20:15

## 2019-01-23 RX ADMIN — HEPARIN SODIUM 5000 UNITS: 5000 INJECTION, SOLUTION INTRAVENOUS; SUBCUTANEOUS at 06:34

## 2019-01-23 RX ADMIN — CLOPIDOGREL BISULFATE 75 MG: 75 TABLET, FILM COATED ORAL at 08:02

## 2019-01-23 RX ADMIN — SENNOSIDES AND DOCUSATE SODIUM 2 TABLET: 8.6; 5 TABLET ORAL at 08:02

## 2019-01-23 ASSESSMENT — ACTIVITIES OF DAILY LIVING (ADL)
ADLS_ACUITY_SCORE: 16

## 2019-01-23 ASSESSMENT — MIFFLIN-ST. JEOR: SCORE: 1217.75

## 2019-01-23 NOTE — PLAN OF CARE
A&Ox4. VSS on RA. LS clear. Dried drainage to left upper thigh and groin [id number]. CMS intacte, pulses +2, L PT via doppler. LLE remained elevated in bed. Denies Numbness/tingling. Denies pain, refused scheduled tylenol this am. Regular diet. No N&V. Active BS. + flatus. lavender used to promote sleep. Continue to monitor.

## 2019-01-23 NOTE — PLAN OF CARE
A&O x4.  VSS on RA.  CMS intact.  Ace wrap CDI, dried drainage to L groin marked with no change.  Reports pain, managed with Tylenol and oxycodone.  Regular diet.  Up with assist x1.  Continue to monitor.

## 2019-01-23 NOTE — CONSULTS
Care Transition Initial Assessment - RN        Met with: Patient and Family,  Chris COLLINS   Active Problems:    History of sarcoma       Cognitive Status: awake, alert and oriented.        Contact information and PCP information verified: Yes  Lives With: spouse                     Insurance concerns: No Insurance issues identified  ASSESSMENT  Patient currently receives the following services:  none        Identified issues/concerns regarding health management: Met due to elevated RAMO/dc planning.  Pt lives w/ spouse.  Has multiple level home, plans to live on 1 level of house.  Has leg assist.  Feels comfortable going home.  Has a trip going planned Feb 3rd-13th to [facility].  Is hoping to still go but waiting approval from MD. Would appreciate f/u appt with PMD set up.  Currently on Plavix/ASA, may add coumadin.  Pt aware this would require lab checks.    PLAN  Financial costs for the patient include TBD .  Patient given options and choices for discharge yes .  Patient/family is agreeable to the plan?  Yes:   Patient anticipates discharging to home .        Patient anticipates needs for home equipment: No  Plan/Disposition: Home   Appointments: See AVS      Care  (CTS) will continue to follow as needed.        -0

## 2019-01-23 NOTE — PROGRESS NOTES
HOSPITALIST CONSULT CHART CHECK:     Hospitalist service was consulted for cross coverage only. We will peripherally follow and chart check throughout the week.      - For vascular medical concerns during business hours M-F, call the [facility] at [phone] to have the rounding/on call Vascular Medicine (NOT VASCULAR SURGERY) MD paged.     - After business hours M-F, for medical concerns on this patient, please page hospitalist staff.     - For vascular surgical questions, please page the appropriate surgeon (primary vascular surgeon or on call vascular surgeon) based upon the time of day.

## 2019-01-23 NOTE — PROGRESS NOTES
[facility]    Vascular Medicine Progress Note    Date of Service (when I saw the patient): 01/23/2019       Assessment & Plan   1. Recurrent left SFA PTFE graft occlusions now s/p left femoral to above knee popliteal artery bypass with PTFE 1/21/19    POD 2   Doing well. Pain controlled. Ambulating some.   VSS, surgical site lookss good, earlier today vascular surgery service changed dressing.  Still moderate swelling of leg  She lives I 2 tacos house with 12 steps, when ready wants to go home with support but planning to stay main floor for few days.    Plan:   -Post-operative cares per Dr. Owens/Vascular Surgery.   -Back on aspirin. Started on Plavix. Vascular surgery ok to continue asa and plavix only for now.  -If she still requires anticoagulation, consider switching over to Coumadin.   -Ambulate.   Elevate leg        2. Hyperlipidemia      Assessment:  -Not at goal on Crestor 40 mg and Zetia 5 mg daily -> Zetia increased to 10 mg daily.  -However, somehow upon refill of her Crestor 2 weeks ago dose was changed to 10 mg daily.     Plan:   -Increase Crestor back to 40 mg daily and continue Zetia 10 mg daily.   -Repeat lipid panel in 3 months through PCP. If her lipids are still not at goal (LDL<70), then [facility] for [facility].      3. Depression      Assessment: This has been stable. She had been on bupropion for decades and is unsure if this is doing much for her. Her depression has been very mild.     Plan: She was initiated on Plavix post-operatively. This decreases the effectiveness of the bupropion. Therefore, the bupropion will be switched to Effexor 37.5 mg daily. After a month, this can be increased to 75 mg daily.     Patient care time spent 35 minutes.      Interval History   Pain under control.  Voiding. Walking some to bathroom.     Physical Exam   Temp: 98.3  F (36.8  C) Temp src: Oral BP: 114/70 Pulse: 87 Heart Rate: 82 Resp:  16 SpO2: 96 % O2 Device: None (Room air)    Vitals:    01/21/19 0929 01/23/19 0600   Weight: 66.9 kg (147 lb 9 oz) 67.1 kg (147 lb 14.9 oz)     Vital Signs with Ranges  Temp:  [97.9  F (36.6  C)-99.1  F (37.3  C)] 98.3  F (36.8  C)  Pulse:  [79-87] 87  Heart Rate:  [69-85] 82  Resp:  [16-18] 16  BP: (103-134)/(68-79) 114/70  SpO2:  [95 %-98 %] 96 %  I/O last 3 completed shifts:  In: 120 [P.O.:120]  Out: 550 [Urine:550]    Constitutional: Awake, alert, cooperative, no apparent distress, and appears stated age.  Eyes: Lids and lashes normal, sclera clear, conjunctiva normal.  ENT: Normocephalic, without obvious abnormality, atraumatic, oral pharynx with moist mucus membranes  Respiratory: No increased work of breathing, good air exchange, clear to auscultation bilaterally, no crackles or wheezing.  Cardiovascular: Regular rate and rhythm, normal S1 and S2, no S3 or S4, and no murmur noted.  GI: Normal bowel sounds, soft, non-distended, non-tender  Skin: No bruising or bleeding, normal skin color, texture, turgor, no redness, warmth, or swelling. Some bloody drainage noted from surgical incision in groin, but dry now.   Musculoskeletal: There is no redness, warmth, or swelling of the joints. LLE lymphedema - chronic.   Neurologic: Awake, alert, oriented to name, place and time.  Cranial nerves II-XII are grossly intact.    Neuropsychiatric: Calm, normal eye contact, alert, normal affect, oriented to self, place, time and situation, memory for past and recent events intact and thought process normal.    Medications     nitroPRUsside (NIPRIDE) IV infusion ADULT/PEDS GREATER than or EQUAL to 45 kg std conc         acetaminophen  975 mg Oral Q8H     aspirin  81 mg Oral Daily     clopidogrel  75 mg Oral Daily     ezetimibe  10 mg Oral Daily     heparin  5,000 Units Subcutaneous Q12H     rosuvastatin  40 mg Oral QPM     senna-docusate  1 tablet Oral BID    Or     senna-docusate  2 tablet Oral BID     sodium chloride (PF)  3  mL Intracatheter Q8H     venlafaxine  37.5 mg Oral Daily with breakfast       Data   Recent Labs   Lab 01/22/19  0724   WBC 5.7   HGB 11.7   MCV 92         POTASSIUM 3.6   CHLORIDE 109   CO2 27   BUN 5*   CR 0.58   ANIONGAP 5   LONG 7.8*   GLC 99     No results found for this or any previous visit (from the past 24 hour(s)).

## 2019-01-23 NOTE — PLAN OF CARE
A/Ox4. AVSS. Up with assist of 1 and walker. Reg diet. +BS. LS-clear. Dressing changed today. Left post-tin pulse needs doppler. Denies pain.

## 2019-01-23 NOTE — PROGRESS NOTES
Vascular Surgery Progress Note    Doing well, no acute issues, mild leg swelling  Dressing replaced, tolerating diet and pain controlled.    B/P: 120/74, T: 98.2, P: 87, R: 18  Alert oriented no acute distress  Left leg incisions c/d/i with sutures, mild dry bloody drainage on dressing removed  Moderate leg swelling. Motor intact, palpable pedal pulses.  Replaced with adaptic and gauze, kerlix and ACE, Leg elevated on multiple pillows.    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    Continue leg elevation, local care and compression with ACE wrap  Ok to ambulate, encouraged patient to limit walking or standing to short durations. Pulse checks  Pain currently controlled  Diet as tolerated  Continue aspirin and plavix for now, appreciate vascular medicine involvement in assisting with medical management.  Anticipate dispo to home in the next few days.    Dara Garcia MD  Vascular Surgery Fellow  Pager [phone]

## 2019-01-24 PROCEDURE — 12000000 ZZH R&B MED SURG/OB

## 2019-01-24 PROCEDURE — 25000132 ZZH RX MED GY IP 250 OP 250 PS 637: Performed by: PHYSICIAN ASSISTANT

## 2019-01-24 PROCEDURE — 25000132 ZZH RX MED GY IP 250 OP 250 PS 637: Performed by: SURGERY

## 2019-01-24 PROCEDURE — 99233 SBSQ HOSP IP/OBS HIGH 50: CPT | Performed by: INTERNAL MEDICINE

## 2019-01-24 PROCEDURE — 25000128 H RX IP 250 OP 636: Performed by: SURGERY

## 2019-01-24 RX ADMIN — HEPARIN SODIUM 5000 UNITS: 5000 INJECTION, SOLUTION INTRAVENOUS; SUBCUTANEOUS at 08:49

## 2019-01-24 RX ADMIN — VENLAFAXINE HYDROCHLORIDE 37.5 MG: 37.5 CAPSULE, EXTENDED RELEASE ORAL at 08:49

## 2019-01-24 RX ADMIN — HEPARIN SODIUM 5000 UNITS: 5000 INJECTION, SOLUTION INTRAVENOUS; SUBCUTANEOUS at 19:41

## 2019-01-24 RX ADMIN — SENNOSIDES AND DOCUSATE SODIUM 2 TABLET: 8.6; 5 TABLET ORAL at 20:40

## 2019-01-24 RX ADMIN — SENNOSIDES AND DOCUSATE SODIUM 2 TABLET: 8.6; 5 TABLET ORAL at 08:49

## 2019-01-24 RX ADMIN — CLOPIDOGREL BISULFATE 75 MG: 75 TABLET, FILM COATED ORAL at 08:49

## 2019-01-24 RX ADMIN — ROSUVASTATIN CALCIUM 40 MG: 20 TABLET, FILM COATED ORAL at 19:41

## 2019-01-24 RX ADMIN — EZETIMIBE 10 MG: 10 TABLET ORAL at 08:49

## 2019-01-24 RX ADMIN — ASPIRIN 81 MG: 81 TABLET, COATED ORAL at 08:49

## 2019-01-24 ASSESSMENT — ACTIVITIES OF DAILY LIVING (ADL)
ADLS_ACUITY_SCORE: 15
ADLS_ACUITY_SCORE: 16
ADLS_ACUITY_SCORE: 16
ADLS_ACUITY_SCORE: 15
ADLS_ACUITY_SCORE: 16
ADLS_ACUITY_SCORE: 16

## 2019-01-24 NOTE — PROGRESS NOTES
HOSPITALIST CONSULT CHART CHECK:     Hospitalist service was consulted for cross coverage only. We will peripherally follow and chart check throughout the week.      - For vascular medical concerns during business hours M-F, call the [facility] at [phone] to have the rounding/on call Vascular Medicine (NOT VASCULAR SURGERY) MD paged.     - After business hours M-F, for medical concerns on this patient, please page hospitalist staff.     - For vascular surgical questions, please page the appropriate surgeon (primary vascular surgeon or on call vascular surgeon) based upon the time of day.

## 2019-01-24 NOTE — PLAN OF CARE
A&Ox4. VSS on RA. Up with SBA and FWW. Tolerating regular diet. +BS, LS clear, +BM. Ace wrap in place. +1 edema. Denies pain. Groin site CDI. Pulses palpable. Slept.

## 2019-01-24 NOTE — PLAN OF CARE
A/Ox4. AVSS. Up with assist of 1 and walker. Reg diet. +BS. LS-clear. Dressing changed today by surgeons. Denies pain. Waiting for suppository to be verified for constipation.

## 2019-01-24 NOTE — PROGRESS NOTES
[facility]    Vascular Medicine Progress Note    Date of Service (when I saw the patient): 01/24/2019       Assessment & Plan   1. Recurrent left SFA PTFE graft occlusions now s/p left femoral to above knee popliteal artery bypass with PTFE 1/21/19    POD 3   Doing well. Pain controlled. Ambulating some.   VSS, surgical site lookss good, earlier today vascular surgery service changed dressing.  Still moderate swelling of leg  She lives I 2 tacos house with 12 steps, when ready wants to go home with support but planning to stay main floor for few days.    Plan:   -Post-operative cares per Dr. Owens/Vascular Surgery.   -Back on aspirin. Started on Plavix. Vascular surgery ok to continue asa and plavix only for now.  -If she still requires anticoagulation, consider switching over to Coumadin.   -Ambulate.   Elevate leg/ blue leg wedge pillow ordered  She will benefit with PT, ordered.        2. Hyperlipidemia      Assessment:  -Not at goal on Crestor 40 mg and Zetia 5 mg daily -> Zetia increased to 10 mg daily.  -However, somehow upon refill of her Crestor 2 weeks ago dose was changed to 10 mg daily.     Plan:   -Increase Crestor back to 40 mg daily and continue Zetia 10 mg daily.   -Repeat lipid panel in 3 months through PCP. If her lipids are still not at goal (LDL<70), then she should be [facility] for further recommendations.      3. Depression      Assessment: This has been stable. She had been on bupropion for decades and is unsure if this is doing much for her. Her depression has been very mild.     Plan: She was initiated on Plavix post-operatively. This decreases the effectiveness of the bupropion. Therefore, the bupropion will be switched to Effexor 37.5 mg daily. After a month, this can be increased to 75 mg daily.     Patient care time spent 35 minutes.      Interval History   Pain under control.  Voiding. Walking some to bathroom.     Physical Exam   Temp: 98.5  F  (36.9  C) Temp src: Oral BP: 114/73 Pulse: 83 Heart Rate: 83 Resp: 16 SpO2: 98 % O2 Device: None (Room air)    Vitals:    01/21/19 0929 01/23/19 0600   Weight: 66.9 kg (147 lb 9 oz) 67.1 kg (147 lb 14.9 oz)     Vital Signs with Ranges  Temp:  [98.2  F (36.8  C)-98.5  F (36.9  C)] 98.5  F (36.9  C)  Pulse:  [83-88] 83  Heart Rate:  [82-85] 83  Resp:  [14-17] 16  BP: (114-138)/(62-86) 114/73  SpO2:  [94 %-98 %] 98 %  I/O last 3 completed shifts:  In: 400 [P.O.:400]  Out: -     Constitutional: Awake, alert, cooperative, no apparent distress, and appears stated age.  Eyes: Lids and lashes normal, sclera clear, conjunctiva normal.  ENT: Normocephalic, without obvious abnormality, atraumatic, oral pharynx with moist mucus membranes  Respiratory: No increased work of breathing, good air exchange, clear to auscultation bilaterally, no crackles or wheezing.  Cardiovascular: Regular rate and rhythm, normal S1 and S2, no S3 or S4, and no murmur noted.  GI: Normal bowel sounds, soft, non-distended, non-tender  Skin: No bruising or bleeding, normal skin color, texture, turgor, no redness, warmth, or swelling. Some bloody drainage noted from surgical incision in groin, but dry now.   Musculoskeletal: There is no redness, warmth, or swelling of the joints. LLE lymphedema - chronic.   Neurologic: Awake, alert, oriented to name, place and time.  Cranial nerves II-XII are grossly intact.    Neuropsychiatric: Calm, normal eye contact, alert, normal affect, oriented to self, place, time and situation, memory for past and recent events intact and thought process normal.    Medications     nitroPRUsside (NIPRIDE) IV infusion ADULT/PEDS GREATER than or EQUAL to 45 kg std conc         acetaminophen  975 mg Oral Q8H     aspirin  81 mg Oral Daily     clopidogrel  75 mg Oral Daily     ezetimibe  10 mg Oral Daily     heparin  5,000 Units Subcutaneous Q12H     rosuvastatin  40 mg Oral QPM     senna-docusate  1 tablet Oral BID    Or      senna-docusate  2 tablet Oral BID     sodium chloride (PF)  3 mL Intracatheter Q8H     venlafaxine  37.5 mg Oral Daily with breakfast       Data   Recent Labs   Lab 01/22/19  0724   WBC 5.7   HGB 11.7   MCV 92         POTASSIUM 3.6   CHLORIDE 109   CO2 27   BUN 5*   CR 0.58   ANIONGAP 5   LONG 7.8*   GLC 99     No results found for this or any previous visit (from the past 24 hour(s)).

## 2019-01-24 NOTE — PROGRESS NOTES
Vascular Surgery Progress Note    Overall doing well, up ambulating with walker and assistance  Moderate leg edema, pain controlled, new ecchymosis on thigh    B/P: 114/73, T: 98.5, P: 83, R: 16  Alert oriented no acute distress  Left leg with moderate edema  ACE in place  Palpable pedal pulses, foot warm, motor intact.    WBC   Date Value Ref Range Status   01/22/2019 5.7 4.0 - 11.0 10e9/L Final   ]  Hemoglobin   Date Value Ref Range Status   01/22/2019 11.7 11.7 - 15.7 g/dL Final   ]  INR   Date Value Ref Range Status   12/19/2018 0.97 0.86 - 1.14 Final      Creatinine   Date Value Ref Range Status   01/22/2019 0.58 0.52 - 1.04 mg/dL Final   ]      Intake/Output Summary (Last 24 hours) at 1/24/2019 1357  Last data filed at 1/24/2019 0702  Gross per 24 hour   Intake 800 ml   Output --   Net 800 ml       Assessment/Plan:  68 year old female s/p left femoral to above knee popliteal bypass w/ PTFE for history of occluded graft and sarcoma resection, chronic lymphedema    Continue leg elevation and ACE wrap, pulse checks  Will discontinue plavix per Dr. Owens, continue daily aspirin.  Diet as tolerated, pain control  PT to see  Possible dispo to home tomorrow depending on pain control, edema and ecchymosis.  Recheck AM labs    Dara Garcia MD  Vascular Surgery Fellow  Pager [phone]    I have seen and examined this patient with the Vascular Fellow. I was involved with the assessment and plan, and I agree with the findings and plan of care as documented in the fellow's note.   Elijah Owens MD

## 2019-01-24 NOTE — PROVIDER NOTIFICATION
[facility]. Patient complaining of abdominal cramping and feeling like she has to have a bowel movement but is unable to have one. Requesting a suppository.

## 2019-01-25 ENCOUNTER — PATIENT OUTREACH (OUTPATIENT)
Dept: CARE COORDINATION | Facility: CLINIC | Age: 69
End: 2019-01-25

## 2019-01-25 ENCOUNTER — APPOINTMENT (OUTPATIENT)
Dept: PHYSICAL THERAPY | Facility: CLINIC | Age: 69
DRG: 253 | End: 2019-01-25
Attending: SURGERY
Payer: COMMERCIAL

## 2019-01-25 VITALS
BODY MASS INDEX: 23.77 KG/M2 | DIASTOLIC BLOOD PRESSURE: 60 MMHG | WEIGHT: 147.93 LBS | HEART RATE: 77 BPM | RESPIRATION RATE: 16 BRPM | SYSTOLIC BLOOD PRESSURE: 132 MMHG | HEIGHT: 66 IN | TEMPERATURE: 97.7 F | OXYGEN SATURATION: 95 %

## 2019-01-25 LAB
ANION GAP SERPL CALCULATED.3IONS-SCNC: 7 MMOL/L (ref 3–14)
BUN SERPL-MCNC: 11 MG/DL (ref 7–30)
CALCIUM SERPL-MCNC: 8.4 MG/DL (ref 8.5–10.1)
CHLORIDE SERPL-SCNC: 106 MMOL/L (ref 94–109)
CO2 SERPL-SCNC: 27 MMOL/L (ref 20–32)
CREAT SERPL-MCNC: 0.65 MG/DL (ref 0.52–1.04)
ERYTHROCYTE [DISTWIDTH] IN BLOOD BY AUTOMATED COUNT: 12.7 % (ref 10–15)
GFR SERPL CREATININE-BSD FRML MDRD: >90 ML/MIN/{1.73_M2}
GLUCOSE SERPL-MCNC: 112 MG/DL (ref 70–99)
HCT VFR BLD AUTO: 33.3 % (ref 35–47)
HGB BLD-MCNC: 11.4 G/DL (ref 11.7–15.7)
MCH RBC QN AUTO: 30.6 PG (ref 26.5–33)
MCHC RBC AUTO-ENTMCNC: 34.2 G/DL (ref 31.5–36.5)
MCV RBC AUTO: 89 FL (ref 78–100)
PLATELET # BLD AUTO: 209 10E9/L (ref 150–450)
POTASSIUM SERPL-SCNC: 3.6 MMOL/L (ref 3.4–5.3)
RBC # BLD AUTO: 3.73 10E12/L (ref 3.8–5.2)
SODIUM SERPL-SCNC: 140 MMOL/L (ref 133–144)
WBC # BLD AUTO: 6.5 10E9/L (ref 4–11)

## 2019-01-25 PROCEDURE — 97116 GAIT TRAINING THERAPY: CPT | Mod: GP | Performed by: PHYSICAL THERAPIST

## 2019-01-25 PROCEDURE — 40000193 ZZH STATISTIC PT WARD VISIT: Performed by: PHYSICAL THERAPIST

## 2019-01-25 PROCEDURE — 36415 COLL VENOUS BLD VENIPUNCTURE: CPT | Performed by: SURGERY

## 2019-01-25 PROCEDURE — 80048 BASIC METABOLIC PNL TOTAL CA: CPT | Performed by: SURGERY

## 2019-01-25 PROCEDURE — 25000132 ZZH RX MED GY IP 250 OP 250 PS 637: Performed by: SURGERY

## 2019-01-25 PROCEDURE — 99233 SBSQ HOSP IP/OBS HIGH 50: CPT | Performed by: INTERNAL MEDICINE

## 2019-01-25 PROCEDURE — 97110 THERAPEUTIC EXERCISES: CPT | Mod: GP | Performed by: PHYSICAL THERAPIST

## 2019-01-25 PROCEDURE — 97161 PT EVAL LOW COMPLEX 20 MIN: CPT | Mod: GP | Performed by: PHYSICAL THERAPIST

## 2019-01-25 PROCEDURE — 85027 COMPLETE CBC AUTOMATED: CPT | Performed by: SURGERY

## 2019-01-25 PROCEDURE — 25000128 H RX IP 250 OP 636: Performed by: SURGERY

## 2019-01-25 PROCEDURE — 97530 THERAPEUTIC ACTIVITIES: CPT | Mod: GP | Performed by: PHYSICAL THERAPIST

## 2019-01-25 PROCEDURE — 25000132 ZZH RX MED GY IP 250 OP 250 PS 637: Performed by: PHYSICIAN ASSISTANT

## 2019-01-25 RX ORDER — OXYCODONE HYDROCHLORIDE 5 MG/1
5 TABLET ORAL EVERY 4 HOURS PRN
Qty: 30 TABLET | Refills: 0 | Status: SHIPPED | OUTPATIENT
Start: 2019-01-25 | End: 2019-01-25

## 2019-01-25 RX ORDER — ROSUVASTATIN CALCIUM 40 MG/1
40 TABLET, COATED ORAL EVERY EVENING
Qty: 30 TABLET | Refills: 0 | Status: SHIPPED | OUTPATIENT
Start: 2019-01-25 | End: 2019-01-31

## 2019-01-25 RX ORDER — VENLAFAXINE HYDROCHLORIDE 37.5 MG/1
37.5 CAPSULE, EXTENDED RELEASE ORAL
Qty: 30 CAPSULE | Refills: 0 | Status: SHIPPED | OUTPATIENT
Start: 2019-01-25 | End: 2019-01-31

## 2019-01-25 RX ORDER — ASPIRIN 325 MG
325 TABLET, DELAYED RELEASE (ENTERIC COATED) ORAL DAILY
Qty: 30 TABLET | Refills: 0 | Status: SHIPPED | OUTPATIENT
Start: 2019-01-26 | End: 2019-01-31

## 2019-01-25 RX ORDER — OXYCODONE HYDROCHLORIDE 5 MG/1
5 TABLET ORAL EVERY 4 HOURS PRN
Qty: 20 TABLET | Refills: 0 | Status: SHIPPED | OUTPATIENT
Start: 2019-01-25 | End: 2019-05-07

## 2019-01-25 RX ADMIN — HEPARIN SODIUM 5000 UNITS: 5000 INJECTION, SOLUTION INTRAVENOUS; SUBCUTANEOUS at 08:47

## 2019-01-25 RX ADMIN — SENNOSIDES AND DOCUSATE SODIUM 2 TABLET: 8.6; 5 TABLET ORAL at 08:47

## 2019-01-25 RX ADMIN — VENLAFAXINE HYDROCHLORIDE 37.5 MG: 37.5 CAPSULE, EXTENDED RELEASE ORAL at 08:47

## 2019-01-25 RX ADMIN — ASPIRIN 81 MG: 81 TABLET, COATED ORAL at 08:47

## 2019-01-25 RX ADMIN — ACETAMINOPHEN 650 MG: 325 TABLET, FILM COATED ORAL at 04:21

## 2019-01-25 RX ADMIN — EZETIMIBE 10 MG: 10 TABLET ORAL at 08:47

## 2019-01-25 ASSESSMENT — ACTIVITIES OF DAILY LIVING (ADL)
ADLS_ACUITY_SCORE: 16
ADLS_ACUITY_SCORE: 15
ADLS_ACUITY_SCORE: 16
ADLS_ACUITY_SCORE: 16

## 2019-01-25 NOTE — DISCHARGE SUMMARY
Physician Discharge Summary     Patient ID:  Angeli Jacobson  [id number]  68 year old  1950    Admit date: 1/21/2019    Discharge date and time: 1/25/2019     Admitting Physician: Shade Owens MD     Discharge Physician: Dr. Owens    Admission Diagnoses: OCCLUDED LEFT LEG POLYTETRAFLUOROETHYLENE GRAFT  History of sarcoma    Discharge Diagnoses: Same    Admission Condition: good    Discharged Condition: good    Indication for Admission: Left leg bypass    Hospital Course: Surgery was without complication and patient was admitted to the surgical floor.  Her left leg was kept in compression with an ACE wrap to the high thigh to decrease edema.  Her activity was slowly advanced and she was seen by PT for evaluation of using stairs.  She had moderate to severe edema that decreased some prior to discharge.  She also developed left thigh petechia that was stable the 2 days prior to discharge.  Her pain was controlled and she was tolerating a regular diet with good bowel function prior to discharge to home on POD#4.    Consults: vascular medicine    Significant Diagnostic Studies: labs: routine    Treatments: surgery: Redo left femoral to above knee popliteal bypass with PTFE on 1/21/19      Disposition: home    Patient Instructions:   Current Discharge Medication List      START taking these medications    Details   !! aspirin (ASA) 325 MG EC tablet Take 1 tablet (325 mg) by mouth daily  Qty: 30 tablet, Refills: 0    Associated Diagnoses: PAD (peripheral artery disease) (H)      !! order for DME Equipment being ordered: Walker Wheels () and Walker ()  Treatment Diagnosis: difficulty walking  Qty: 1 each, Refills: 0    Associated Diagnoses: PAD (peripheral artery disease) (H); Pain in joint, multiple sites      oxyCODONE (ROXICODONE) 5 MG tablet Take 1 tablet (5 mg) by mouth every 4 hours as needed for moderate to severe pain  Qty: 20 tablet, Refills: 0    Associated Diagnoses: History of sarcoma;  PAD (peripheral artery disease) (H)      venlafaxine (EFFEXOR-XR) 37.5 MG 24 hr capsule Take 1 capsule (37.5 mg) by mouth daily (with breakfast)  Qty: 30 capsule, Refills: 0    Associated Diagnoses: History of sarcoma       !! - Potential duplicate medications found. Please discuss with provider.      CONTINUE these medications which have CHANGED    Details   !! rosuvastatin (CRESTOR) 40 MG tablet Take 1 tablet (40 mg) by mouth every evening  Qty: 30 tablet, Refills: 0    Associated Diagnoses: History of sarcoma; PAD (peripheral artery disease) (H)       !! - Potential duplicate medications found. Please discuss with provider.      CONTINUE these medications which have NOT CHANGED    Details   Acetaminophen 325 MG CAPS Take 325-650 mg by mouth 2 times daily as needed      alendronate (FOSAMAX) 70 MG tablet Take 1 tablet (70 mg) by mouth with 8oz water every 7 days 30 minutes before breakfast and remain upright during this time.  Qty: 12 tablet, Refills: 3    Associated Diagnoses: Age-related osteoporosis without current pathological fracture      !! aspirin 81 MG EC tablet Take 81 mg by mouth daily      CALCIUM PO Take 1 tablet by mouth daily      ezetimibe (ZETIA) 10 MG tablet Take 1 tablet (10 mg) by mouth daily  Qty: 90 tablet, Refills: 1    Associated Diagnoses: Hyperlipidemia LDL goal <70      fluticasone (FLONASE) 50 MCG/ACT spray Spray 1-2 sprays into both nostrils daily  Qty: 1 Bottle, Refills: 11    Associated Diagnoses: Seasonal allergic rhinitis, unspecified chronicity, unspecified trigger      multivitamin, therapeutic (THERA-VIT) TABS tablet Take 1 tablet by mouth daily      !! rosuvastatin (CRESTOR) 10 MG tablet Take 10 mg by mouth every evening   Qty: 90 tablet, Refills: 3      !! order for DME Equipment being ordered: Left Thigh High Compression Stocking, 550 CCL.3  Qty: 1 each, Refills: 99    Associated Diagnoses: Myxoid liposarcoma (H)      !! ORDER FOR DME Equipment being ordered: lymphedema  bandages  Qty: 2 Device, Refills: 1    Associated Diagnoses: Personal history of malignant neoplasm of other site; Other lymphedema       !! - Potential duplicate medications found. Please discuss with provider.      STOP taking these medications       buPROPion (WELLBUTRIN XL) 300 MG 24 hr tablet Comments:   Reason for Stopping:         rivaroxaban ANTICOAGULANT (XARELTO) 20 MG TABS tablet Comments:   Reason for Stopping:             Activity: activity as tolerated and no lifting, Driving, or Strenuous exercise for 2-4 weeks  Diet: regular diet  Wound Care: keep wound clean and dry and Compression to left leg including elevation    Follow-up with Dr. Owens in 1-2 weeks.    Signed:  Stacey LeJeune  1/25/2019  10:40 AM

## 2019-01-25 NOTE — PROGRESS NOTES
Clinic Care Coordination Contact  [facility]/Voicemail    Referral Source: IP Handoff    Reason for Hospitalization:  OCCLUDED LEFT LEG POLYTETRAFLUOROETHYLENE GRAFT  History of sarcoma  Admit Date/Time: 1/21/2019  8:37 AM  Discharge Date: 1/25/19  Key Recommendations:  Home with spouse assist.  Follow up with surgeon.  PCP appt made.       Clinical Data: Care Coordinator Outreach    Next 5 appointments (look out 90 days)    Jan 31, 2019 10:30 AM CST  [facility] Sepideh [facility]ris [facility]-C  [facility] [address] [place] [id number] [phone]            Outreach attempted x 1.  Left message on voicemail with call back information and requested return call.  Plan: RN CC will outreach in 1-2 weeks, will be available sooner if needed. Contact information given.     Ailin Mcclain RN  Clinic Care Coordinator  Office [phone]  [email or web address]

## 2019-01-25 NOTE — PROGRESS NOTES
01/25/19 1002   Quick Adds   Type of Visit Initial PT Evaluation   Living Environment   Lives With spouse   Living Arrangements house   Home Accessibility stairs to enter home;stairs within home   Living Environment Comment 2 outside, full within   Self-Care   Usual Activity Tolerance good   Current Activity Tolerance moderate   Functional Level Prior   Ambulation 0-->independent   Transferring 0-->independent   Toileting 0-->independent   Bathing 0-->independent   Communication 0-->understands/communicates without difficulty   Swallowing 0-->swallows foods/liquids without difficulty   Cognition 0 - no cognition issues reported   Fall history within last six months no   Which of the above functional risks had a recent onset or change? ambulation   Prior Functional Level Comment independent   General Information   Onset of Illness/Injury or Date of Surgery - Date 01/23/19   Referring Physician Tiesha Prince MD   Patient/Family Goals Statement return home   Pertinent History of Current Problem (include personal factors and/or comorbidities that impact the POC) Recurrent left SFA PTFE graft occlusions now undergoing a left femoral to above knee popliteal artery bypass with PTFE   Weight-Bearing Status - LLE weight-bearing as tolerated   Cognitive Status Examination   Orientation orientation to person, place and time   Level of Consciousness alert   Follows Commands and Answers Questions 100% of the time   Personal Safety and Judgment intact   Memory intact   Range of Motion (ROM)   ROM Comment dec L knee ROM   Strength   Strength Comments dec L strength due to pain and edema   Bed Mobility   Bed Mobility Comments independent   Transfer Skills   Transfer Comments SBA   Gait   Gait Comments SBA w/o WW, dec step length,anatalgic pattern w/ dec L knee flexion and reduced L stance   Balance   Balance Comments impaired due to L LE pain with weight bearing   General Therapy Interventions   Planned Therapy  "Interventions ROM;strengthening;gait training   Clinical Impression   Criteria for Skilled Therapeutic Intervention yes, treatment indicated   PT Diagnosis difficulty walking   Influenced by the following impairments impaired gait, dec LLE ROM/strength   Functional limitations due to impairments impaired functional mobility   Clinical Presentation Stable/Uncomplicated   Clinical Presentation Rationale s/p graft bypass with painful LLE ROM   Clinical Decision Making (Complexity) Low complexity   Therapy Frequency` (one time only)   Predicted Duration of Therapy Intervention (days/wks) eval and single treat   Anticipated Equipment Needs at Discharge front wheeled walker   Anticipated Discharge Disposition Home with Outpatient Therapy   Risk & Benefits of therapy have been explained Yes   Patient, Family & other staff in agreement with plan of care Yes   [facility] TM \"6 Clicks\"   2016, Trustees of [facility], under license to [employer].  All rights reserved.   6 Clicks Short Forms Basic Mobility Inpatient Short Form   [facility]  \"6 Clicks\" V.2 Basic Mobility Inpatient Short Form   1. Turning from your back to your side while in a flat bed without using bedrails? 4 - None   2. Moving from lying on your back to sitting on the side of a flat bed without using bedrails? 4 - None   3. Moving to and from a bed to a chair (including a wheelchair)? 4 - None   4. Standing up from a chair using your arms (e.g., wheelchair, or bedside chair)? 3 - A Little   5. To walk in hospital room? 3 - A Little   6. Climbing 3-5 steps with a railing? 3 - A Little   Basic Mobility Raw Score (Score out of 24.Lower scores equate to lower levels of function) 21   Total Evaluation Time   Total Evaluation Time (Minutes) 15     "

## 2019-01-25 NOTE — PLAN OF CARE
PT:  Discharge Planner PT   Patient plan for discharge: home  Current status: Pt mobilizing well, independent with bed mobility and SBA for transfers limited by LLE incisional pain. Pt ambulated w/ SBA initially w/o WW, having antalgic pattern w/ dec L step length and L stance, WW provided and pt cued to even step length and bending knee as tolerated, pt mod indep w/ ambulation with WW. Will plan to issue WW for home use. Good tolerance for LE exercises for strength and L knee ROM, handouts provided. Pt negotiated 6 steps w/ single hand rail, step to pattern with SBA.  Barriers to return to prior living situation: none  Recommendations for discharge: OPPT following return from [place] trip if knee ROM not returned to baseline  Rationale for recommendations: Pt will benefit from skilled PT services to address the above impairments and assist with safe progression of mobility. PT goals met with single treatment session, discharge to home             Entered by: Skyla Tillman 01/25/2019 10:43 AM

## 2019-01-25 NOTE — PROVIDER NOTIFICATION
MD Notification    Notified Person: MD    Notified Person Name: Niki     Notification Date/Time:   10:00 am  Notification Interaction: in person     Purpose of Notification: discharge instructions do not include plavix.     Orders Received: Niki Discussed with PA Lejeune, No plavix at discharge. Aspirin 325mg daily.     Comments: Informed him AVS still states 81mg

## 2019-01-25 NOTE — PLAN OF CARE
Pt a/o x4. AVSS. Lungs clear. Bowels active, +flat, -BM. Lt leg elevated and ace wrapped. Pulses palpable. Ind. Voiding adequately. Denies pain.

## 2019-01-25 NOTE — PROGRESS NOTES
[facility]    Vascular Medicine Progress Note    Date of Service (when I saw the patient): 01/25/2019          Physician Supervisory Attestation:   I have reviewed and discussed with the physician assistant their history, physical and plan and independently interviewed and examined Angeli Jacobson and agree with the plan as stated in the physician assistant note.      Doing well, ambulating.  Vascular surgery decided ASA only, we will increase dose to 325 mg daily  If she still requires anticoagulation, consider switching over to Coumadin.   Follow up with Dr. Owens post op check      Patient care time spent 35 minutes today    Tiesha Prince MD ,[facility]  Vascular Medicine  1/25/2019        Assessment & Plan   1. Recurrent left SFA PTFE graft occlusions now s/p left femoral to above knee popliteal artery bypass with PTFE 1/21/19    POD 4   Doing well. Pain controlled. Ambulating some.   VSS, surgical site lookss good, earlier today vascular surgery service changed dressing.  Still moderate swelling of leg  She lives in 2 tacos house with 12 steps, when ready wants to go home with support but planning to stay main floor for few days.    Plan:   -Post-operative cares per Dr. Owens/Vascular Surgery.   -Back on aspirin - increase to 325 mg daily. Plavix and Xarelto not needed per Vascular Surgery.   -If she still requires anticoagulation, consider switching over to Coumadin.   -Ambulate.   -Elevate leg/ blue leg wedge pillow ordered  -Home today. Follow up with Dr. Owens before planned travel.       2. Hyperlipidemia      Assessment:  -Not at goal on Crestor 40 mg and Zetia 5 mg daily -> Zetia increased to 10 mg daily.  -However, somehow upon refill of her Crestor 2 weeks ago dose was changed to 10 mg daily.     Plan:   -Increase Crestor back to 40 mg daily and continue Zetia 10 mg daily.   -Repeat lipid panel in 3 months through PCP. If her lipids are still not at goal (LDL<70),  then [facility] for further recommendations.      3. Depression      Assessment: This has been stable. She had been on bupropion for decades and is unsure if this is doing much for her. Her depression has been very mild.     Plan: She was initiated on Plavix post-operatively. This decreases the effectiveness of the bupropion. Therefore, the bupropion was switched to Effexor 37.5 mg daily. After a month, this can be increased to 75 mg daily.         Interval History   Pain under control.  Voiding. Walking halls ok. Feels ready to go home.     Physical Exam   Temp: 97.7  F (36.5  C) Temp src: Oral BP: 132/60 Pulse: 77 Heart Rate: 77 Resp: 16 SpO2: 95 % O2 Device: None (Room air)    Vitals:    01/21/19 0929 01/23/19 0600   Weight: 66.9 kg (147 lb 9 oz) 67.1 kg (147 lb 14.9 oz)     Vital Signs with Ranges  Temp:  [97.7  F (36.5  C)-98.5  F (36.9  C)] 97.7  F (36.5  C)  Pulse:  [77] 77  Heart Rate:  [77-85] 77  Resp:  [16] 16  BP: (105-132)/(60-81) 132/60  SpO2:  [95 %-96 %] 95 %  I/O last 3 completed shifts:  In: 500 [P.O.:500]  Out: -     Constitutional: Awake, alert, cooperative, no apparent distress, and appears stated age.  Eyes: Lids and lashes normal, sclera clear, conjunctiva normal.  ENT: Normocephalic, without obvious abnormality, atraumatic, oral pharynx with moist mucus membranes  Respiratory: No increased work of breathing, good air exchange, clear to auscultation bilaterally, no crackles or wheezing.  Cardiovascular: Regular rate and rhythm, normal S1 and S2, no S3 or S4, and no murmur noted.  GI: Normal bowel sounds, soft, non-distended, non-tender  Skin: No bruising or bleeding, normal skin color, texture, turgor, no redness, warmth, or swelling. Some bloody drainage noted from surgical incision in groin, but dry now.   Musculoskeletal: There is no redness, warmth, or swelling of the joints. LLE lymphedema - chronic.   Neurologic: Awake, alert, oriented to name, place  and time.  Cranial nerves II-XII are grossly intact.    Neuropsychiatric: Calm, normal eye contact, alert, normal affect, oriented to self, place, time and situation, memory for past and recent events intact and thought process normal.    Medications     nitroPRUsside (NIPRIDE) IV infusion ADULT/PEDS GREATER than or EQUAL to 45 kg std conc         [START ON 1/26/2019] aspirin  325 mg Oral Daily     ezetimibe  10 mg Oral Daily     heparin  5,000 Units Subcutaneous Q12H     rosuvastatin  40 mg Oral QPM     senna-docusate  1 tablet Oral BID    Or     senna-docusate  2 tablet Oral BID     sodium chloride (PF)  3 mL Intracatheter Q8H     venlafaxine  37.5 mg Oral Daily with breakfast       Data   Recent Labs   Lab 01/25/19  0611 01/22/19  0724   WBC 6.5 5.7   HGB 11.4* 11.7   MCV 89 92    163    141   POTASSIUM 3.6 3.6   CHLORIDE 106 109   CO2 27 27   BUN 11 5*   CR 0.65 0.58   ANIONGAP 7 5   LONG 8.4* 7.8*   * 99     No results found for this or any previous visit (from the past 24 hour(s)).

## 2019-01-25 NOTE — PLAN OF CARE
Pt. A&O. VSS. Indp. CMS intact. Dressing changed by surgery team and is dry and intact. Denies pain. Discharging home with . Reviewed AVS including: follow up plan, when to seek medical attention, and medication changes.

## 2019-01-25 NOTE — PLAN OF CARE
POD #3 from left fem pop. A&Ox4. VSS on RA. Denies pain, refuses scheduled Tylenol. Independent. Ambulated in rios x4. LLE ace wrapped, dressing changed. Increased bruising noted above incision site, MD aware and monitoring. LLE to be elevated when not ambulating. Regular diet, good appetite. CMS intact. Discharge pending. Nursing will continue to monitor.

## 2019-01-25 NOTE — PLAN OF CARE
POD x3 .indepentent.LLE ace wrapped and elevated.Bruising noted in the incision site.Tolerating diet. CMS intact.Will monitor.

## 2019-01-25 NOTE — PROGRESS NOTES
Vascular Surgery Progress Note    Doing well, leg edema improving, pain controlled, ambulating well with walker.  Thigh petechia stable.    B/P: 105/81, T: 98.5, P: 83, R: 16  Alert oriented no acute distress  Left leg wrap changed, kerlix, adaptic ABD pad, edema improving  Motor intact, no significant edema at foot, palpable pulses  Incisions intact with sutures    WBC   Date Value Ref Range Status   01/25/2019 6.5 4.0 - 11.0 10e9/L Final   ]  Hemoglobin   Date Value Ref Range Status   01/25/2019 11.4 (L) 11.7 - 15.7 g/dL Final   ]  INR   Date Value Ref Range Status   12/19/2018 0.97 0.86 - 1.14 Final      Creatinine   Date Value Ref Range Status   01/25/2019 0.65 0.52 - 1.04 mg/dL Final   ]      Intake/Output Summary (Last 24 hours) at 1/25/2019 0632  Last data filed at 1/24/2019 2200  Gross per 24 hour   Intake 500 ml   Output --   Net 500 ml       Assessment/Plan:  68 year old female  POD#4 s/p left femoral to above knee popliteal bypass w/ PTFE for history of occluded graft and sarcoma resection, chronic lymphedema     Continue ambulation as tolerated, leg elevation, PT, ACE wrap to leg  Pain management  Diet as tolerated  Dispo planning to home possibly today if able to tolerate stairs otherwise anticipate discharge in next 1-2 days.  Discussed with patient and questions answered.    Dara Garcia MD  Vascular Surgery Fellow  Pager [phone]

## 2019-01-25 NOTE — PROGRESS NOTES
HOSPITALIST CONSULT CHART CHECK:     Hospitalist service was consulted for cross coverage only. We will peripherally follow and chart check throughout the week.      - For vascular medical concerns during business hours M-F, call the [facility] at [phone] to have the rounding/on call Vascular Medicine (NOT VASCULAR SURGERY) MD paged.     - After business hours M-F, for medical concerns on this patient, please page hospitalist staff.     - For vascular surgical questions, please page the appropriate surgeon (primary vascular surgeon or on call vascular surgeon) based upon the time of day.     Barbara Goff [facility]  [phone]

## 2019-01-26 ENCOUNTER — TELEPHONE (OUTPATIENT)
Dept: FAMILY MEDICINE | Facility: CLINIC | Age: 69
End: 2019-01-26

## 2019-01-26 NOTE — TELEPHONE ENCOUNTER
Patient calling with questions on her discharge summary.  States after her med list has pages about taking Plavix.  States was not sent home with Plavix.  Advised Plavix not on med list and I do not see this as part of discharge summary.  Gave SD phone # and # for Dr. Owens to call to try and clarify her questions.  Reyna Luciano RN

## 2019-01-26 NOTE — PROGRESS NOTES
Please see my recent progress note from an office visit on 1/9/2019.      Angeli was just seen on 1/9/2019 following successful re-thrombolysis of her left proximal superficial femoral to mid superficial femoral PTFE interposition graft.  She has been maintained on Xarelto and aspirin.  Unfortunately the graft has reoccluded for the third time in the last 8 months.  She presents today to discuss her treatment options.    She is a short distance left leg claudicator.  She notes occasional paresthesias in the left foot.  She denies rest pain or new wounds.    Exam:  Blood pressure 151/84 with a pulse of 82.  Palpable femoral pulses bilaterally.  Palpable right-sided pedal pulses.  Monophasic left-sided pedal Doppler signals.    Resting left-sided ADRIAN is 0.43.    IMPRESSION:  1.  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    2.  Chronic left leg lymphedema related to prior sarcoma resection and irradiation    RECOMMENDATION:  I reviewed all the above with Angeli and her .  We have exhausted percutaneous attempts at preserving the 19-year old graft.  I recommend a redo left leg revascularization.  Our distal target should be the distal left superficial femoral artery or perhaps the above-knee popliteal artery.  I plan to use PTFE once again to limit dissection in the left leg.  Hopefully, we will not significantly exacerbate her chronic left leg lymphedema.  She will hold her Xarelto for 2 days prior to the procedure.  She may continue her aspirin uninterrupted.  All of her questions were answered and she verbalizes full understanding.    Total face-to-face time was 25 minutes, greater than 50% spent providing counseling and education.    Elijah Owens MD

## 2019-01-28 ENCOUNTER — PATIENT OUTREACH (OUTPATIENT)
Dept: CARE COORDINATION | Facility: CLINIC | Age: 69
End: 2019-01-28

## 2019-01-28 NOTE — PROGRESS NOTES
Clinic Care Coordination Contact    Clinic Care Coordination Contact  OUTREACH    Referral Information:  Referral Source: IP Handoff         Chief Complaint   Patient presents with     Clinic Care Coordination - Post Hospital        Universal Utilization: Pt had BYPASS GRAFT FEMOROPOPLITEAL surgery on 1/21/19 and was in hospital until 1/25/19. Pt was hospitalized from 12/19/18 - 12/22/18 for Selective left lower extremity angiography with thrombolysis of left superficial femoral artery PTFE interposition graft and placement of new 6 mm Viabahn covered stent.      Utilization    Last refreshed: 1/28/2019 10:40 AM:  Hospital Admissions 3           Last refreshed: 1/28/2019 10:40 AM:  ED Visits 2           Last refreshed: 1/28/2019 10:40 AM:  No Show Count (past year) 0              Current as of: 1/28/2019 10:40 AM              Clinical Concerns:  Current Medical Concerns: OCCLUDED LEFT LEG POLYTETRAFLUOROETHYLENE GRAFT  History of sarcoma        Medication Management:Pt spoke with RN on 1/26/19 regarding medication Plavax and was directed to call dr. Owens to clarify her questions and she reported to [facility] that she was able to get her questions answered by dr. Owens.       Living Situation:  Current living arrangement:: I live in a private home with spouse  Type of residence:: Private home - no stairs           Psychosocial:  Informal Support system:: Partner             Patient/Caregiver understanding: Pt expressed understanding of what was discussed during outreach call today.         Future Appointments              In 2 days Shade Owens MD [facility]    In 3 days Sepideh Burris PA-C [facility]          Plan: Pt plans to go to her follow up PCP appointment on 1/31/19 and she has a follow up appointment with Dr. Owens on 1/30/19.  [facility] provided her contact information to PT in case she needs further assistance.    AJAY De La Torre   Care Coordination  Team  [phone]

## 2019-01-30 ENCOUNTER — OFFICE VISIT (OUTPATIENT)
Dept: SURGERY | Facility: CLINIC | Age: 69
End: 2019-01-30
Payer: COMMERCIAL

## 2019-01-30 VITALS
SYSTOLIC BLOOD PRESSURE: 118 MMHG | HEART RATE: 79 BPM | DIASTOLIC BLOOD PRESSURE: 78 MMHG | OXYGEN SATURATION: 99 % | HEIGHT: 66 IN | RESPIRATION RATE: 16 BRPM | BODY MASS INDEX: 23.63 KG/M2 | WEIGHT: 147 LBS

## 2019-01-30 DIAGNOSIS — Z09 SURGICAL FOLLOWUP VISIT: Primary | ICD-10-CM

## 2019-01-30 DIAGNOSIS — I73.9 PERIPHERAL VASCULAR DISEASE WITH CLAUDICATION (H): ICD-10-CM

## 2019-01-30 PROCEDURE — 99024 POSTOP FOLLOW-UP VISIT: CPT | Performed by: SURGERY

## 2019-01-30 RX ORDER — CLOPIDOGREL BISULFATE 75 MG/1
75 TABLET ORAL DAILY
Qty: 90 TABLET | Refills: 3 | Status: SHIPPED | OUTPATIENT
Start: 2019-01-30 | End: 2019-05-08

## 2019-01-30 ASSESSMENT — MIFFLIN-ST. JEOR: SCORE: 1213.54

## 2019-01-30 NOTE — LETTER
[facility] [place] [address]  [place] [address]  Phone: [phone]  Fax: [phone]      2019       Re: Angeli Jacobson - 1950    Angeli Jacobson returns today 9 days status post redo left femoral to above-knee popliteal bypass with 6 mm PTFE graft.  Please see my prior office notes.  She has history of left groin sarcoma resection with lymphadenectomy and irradiation.  She has moderate left leg lymphedema as her baseline and wears a thigh-high prescription compression stocking.  She presents today for an early postoperative check as she and her  hope to travel to [place] this coming weekend for a brief vacation.  At today's visit she is without complaints.     Exam:  Well-developed female in no acute distress.  Moderate left leg lymphedema which does not seem significantly worsened from her baseline.  Left groin and popliteal incisions are healing with sutures intact.  Palpable left-sided pedal pulses.  The left groin ecchymosis noted immediately postop has completely resolved.     IMPRESSION:  POD #9 redo left femoral to above-knee popliteal bypass with PTFE graft overall doing well. Moderate left leg lymphedema not significantly worsened from her baseline.     RECOMMENDATION:  I had a nice discussion with Angeli and her  regarding all the above.  Her sutures are not yet ready to remove due to the moderate left leg lymphedema.  Her left groin bruising has completely resolved and now does not seem related to Plavix.  I therefore will restart Plavix and maintain her on dual antiplatelet therapy.  She is utilizing her compression wraps and garments baseline.  They have a nonrefundable trip to [place].  We discussed preventative measures to avoid DVT.  She used to avoid swimming or vigorous exercise.  She is to elevate the left leg whenever possible.  Vascular surgical follow-up will be with me in roughly 2 weeks upon their  return from their vacation.       Elijah Owens MD

## 2019-01-31 ENCOUNTER — OFFICE VISIT (OUTPATIENT)
Dept: FAMILY MEDICINE | Facility: CLINIC | Age: 69
End: 2019-01-31
Payer: COMMERCIAL

## 2019-01-31 VITALS
DIASTOLIC BLOOD PRESSURE: 64 MMHG | HEIGHT: 66 IN | WEIGHT: 150 LBS | TEMPERATURE: 98 F | HEART RATE: 66 BPM | OXYGEN SATURATION: 97 % | SYSTOLIC BLOOD PRESSURE: 116 MMHG | BODY MASS INDEX: 24.11 KG/M2

## 2019-01-31 DIAGNOSIS — D64.9 LOW HEMOGLOBIN: ICD-10-CM

## 2019-01-31 DIAGNOSIS — I73.9 PAD (PERIPHERAL ARTERY DISEASE) (H): ICD-10-CM

## 2019-01-31 DIAGNOSIS — R79.0 CALCIUM BLOOD DECREASED: Primary | ICD-10-CM

## 2019-01-31 DIAGNOSIS — E78.5 HYPERLIPIDEMIA LDL GOAL <70: ICD-10-CM

## 2019-01-31 DIAGNOSIS — Z85.831 HISTORY OF SARCOMA: ICD-10-CM

## 2019-01-31 LAB
ANION GAP SERPL CALCULATED.3IONS-SCNC: 6 MMOL/L (ref 3–14)
BUN SERPL-MCNC: 10 MG/DL (ref 7–30)
CALCIUM SERPL-MCNC: 9 MG/DL (ref 8.5–10.1)
CHLORIDE SERPL-SCNC: 103 MMOL/L (ref 94–109)
CO2 SERPL-SCNC: 27 MMOL/L (ref 20–32)
CREAT SERPL-MCNC: 0.7 MG/DL (ref 0.52–1.04)
ERYTHROCYTE [DISTWIDTH] IN BLOOD BY AUTOMATED COUNT: 12.8 % (ref 10–15)
GFR SERPL CREATININE-BSD FRML MDRD: 88 ML/MIN/{1.73_M2}
GLUCOSE SERPL-MCNC: 96 MG/DL (ref 70–99)
HCT VFR BLD AUTO: 37.6 % (ref 35–47)
HGB BLD-MCNC: 12.1 G/DL (ref 11.7–15.7)
MCH RBC QN AUTO: 30.3 PG (ref 26.5–33)
MCHC RBC AUTO-ENTMCNC: 32.2 G/DL (ref 31.5–36.5)
MCV RBC AUTO: 94 FL (ref 78–100)
PLATELET # BLD AUTO: 471 10E9/L (ref 150–450)
POTASSIUM SERPL-SCNC: 4.1 MMOL/L (ref 3.4–5.3)
RBC # BLD AUTO: 4 10E12/L (ref 3.8–5.2)
SODIUM SERPL-SCNC: 136 MMOL/L (ref 133–144)
WBC # BLD AUTO: 9.8 10E9/L (ref 4–11)

## 2019-01-31 PROCEDURE — 80048 BASIC METABOLIC PNL TOTAL CA: CPT | Performed by: PHYSICIAN ASSISTANT

## 2019-01-31 PROCEDURE — 99214 OFFICE O/P EST MOD 30 MIN: CPT | Performed by: PHYSICIAN ASSISTANT

## 2019-01-31 PROCEDURE — 36415 COLL VENOUS BLD VENIPUNCTURE: CPT | Performed by: PHYSICIAN ASSISTANT

## 2019-01-31 PROCEDURE — 85027 COMPLETE CBC AUTOMATED: CPT | Performed by: PHYSICIAN ASSISTANT

## 2019-01-31 RX ORDER — ROSUVASTATIN CALCIUM 40 MG/1
40 TABLET, COATED ORAL EVERY EVENING
Qty: 90 TABLET | Refills: 3 | Status: SHIPPED | OUTPATIENT
Start: 2019-01-31 | End: 2019-07-11

## 2019-01-31 RX ORDER — VENLAFAXINE HYDROCHLORIDE 37.5 MG/1
37.5 CAPSULE, EXTENDED RELEASE ORAL
Qty: 90 CAPSULE | Refills: 1 | Status: SHIPPED | OUTPATIENT
Start: 2019-01-31 | End: 2019-02-25

## 2019-01-31 RX ORDER — ASPIRIN 81 MG/1
81 TABLET, CHEWABLE ORAL DAILY
COMMUNITY
End: 2023-04-07

## 2019-01-31 ASSESSMENT — MIFFLIN-ST. JEOR: SCORE: 1227.15

## 2019-01-31 NOTE — PROGRESS NOTES
SUBJECTIVE:   Angeli Jacobson is a 68 year old female who presents to clinic today for the following health issues:          Hospital Follow-up Visit:    Hospital/Nursing Home/IP Rehab Facility: [facility]  Date of Admission: 1/21/2019  Date of Discharge: 1/25/2019  Reason(s) for Admission: redo on bypass surgery            Problems taking medications regularly:  None       Medication changes since discharge: updtaed       Problems adhering to non-medication therapy:  None    Summary of hospitalization:  [facility] discharge summary reviewed  Diagnostic Tests/Treatments reviewed.  Follow up needed: vascular  Other Healthcare Providers Involved in Patient s Care:         None  Update since discharge: improved.     Post Discharge Medication Reconciliation: discharge medications reconciled, continue medications without change.  Plan of care communicated with patient and family     Coding guidelines for this visit:  Type of Medical   Decision Making Face-to-Face Visit       within 7 Days of discharge Face-to-Face Visit        within 14 days of discharge   Moderate Complexity [id number]   High Complexity [id number]              Patient saw vascular yesterday. Stopping 325 mg ASA therapy and changing to LD ASA and Plavix.  Planning to go to [place], [place] with  next week.     Problem list and histories reviewed & adjusted, as indicated.  Additional history: as documented    Patient Active Problem List   Diagnosis     Pain in joint, multiple sites     MULTINODUL GOITER     Hearing loss     Hyperlipidemia LDL goal <70     Osteoporosis     Cervicalgia     Major depressive disorder, recurrent episode, moderate (H)     Impaired fasting glucose     Lymphedema of left leg     Tubular adenoma     Other constipation     Vertigo     Myxoid liposarcoma (HCC)     PAD (peripheral artery disease) (H)     Vascular graft occlusion (H)     Femoral-popliteal bypass graft occlusion, left (H)     History  of sarcoma     Past Surgical History:   Procedure Laterality Date     BYPASS GRAFT FEMOROPOPLITEAL Left 2019    Procedure: LEFT FEMORAL TO ABOVE KNEE POPLITEAL  BYPASS WITH POLYTETRAFLUOROETHYLENE GRAFT;  Surgeon: Shade Owens MD;  Location: [facility] OR     C APPENDECTOMY      Appendectomy     C NONSPECIFIC PROCEDURE  2000    Open Biopsy Left Thigh Liposarcoma     COLONOSCOPY N/A 2016    Procedure: COMBINED COLONOSCOPY, SINGLE OR MULTIPLE BIOPSY/POLYPECTOMY BY BIOPSY;  Surgeon: Varun Stanley MD, MD;  Location: [facility] GI     EXCISION MALIG LESION>1.25CM  2000    Myxoid Liposarcoma       EXPLORE GROIN Right 2018    Procedure: EXPLORE GROIN;  EMERGENCY RIGHT FEMORAL EXPLORATION WITH FEMORAL ARTERY REPAIR.    EBL: 50mL;  Surgeon: Shade Owens MD;  Location: [facility] OR     HC COLONOSCOPY THRU STOMA, DIAGNOSTIC      due      HC COLP CERVIX/UPPER VAGINA  1997    Negative     HC DILATION/CURETTAGE DIAG/THER NON OB  1997    Post menopausal bleeding on HRT, negative     IR ANGIOGRAM THROUGH CATHETER FOLLOW UP  2018     IR ANGIOGRAM THROUGH CATHETER FOLLOW UP  2018     IR LOWER EXTREMITY ANGIOGRAM LEFT  2018     VASCULAR SURGERY  2018    Left SFA stent in bypass graft       Social History     Tobacco Use     Smoking status: Never Smoker     Smokeless tobacco: Never Used   Substance Use Topics     Alcohol use: No     Family History   Problem Relation Age of Onset     C.A.D. Father         MI 57     Alcohol/Drug Father         etoh     Obesity Mother      Osteoporosis Mother      Colon Cancer Brother 70     Hyperlipidemia Son      Hyperlipidemia Son         very high, experimental drug     C.A.D. Paternal Grandmother         ascvd     Diabetes Maternal Grandmother      Cancer Maternal Grandmother      C.A.D. Paternal Uncle         Mi  age 48     Cancer Maternal Aunt         pancreatic CA           Reviewed and updated as needed this visit by clinical staff      "  Reviewed and updated as needed this visit by Provider         ROS:  Constitutional, HEENT, cardiovascular, pulmonary, gi and gu systems are negative, except as otherwise noted.    OBJECTIVE:     /64 (BP Location: Right arm, Patient Position: Chair, Cuff Size: Adult Regular)   Pulse 66   Temp 98  F (36.7  C) (Oral)   Ht 1.676 m (5' 6\")   Wt 68 kg (150 lb)   LMP 03/18/2005   SpO2 97%   BMI 24.21 kg/m    Body mass index is 24.21 kg/m .  GENERAL APPEARANCE: healthy, alert and no distress  RESP: lungs clear to auscultation - no rales, rhonchi or wheezes  CV: regular rates and rhythm, normal S1 S2, no S3 or S4 and no murmur, click or rub  PSYCH: mentation appears normal and affect normal/bright        ASSESSMENT/PLAN:             1. Calcium blood decreased  Await labs.   - Basic metabolic panel  (Ca, Cl, CO2, Creat, Gluc, K, Na, BUN)    2. PAD (peripheral artery disease) (H)  Refilled.   - rosuvastatin (CRESTOR) 40 MG tablet; Take 1 tablet (40 mg) by mouth every evening  Dispense: 90 tablet; Refill: 3    3. Low hemoglobin  Await labs  - CBC with platelets    4. History of sarcoma    - rosuvastatin (CRESTOR) 40 MG tablet; Take 1 tablet (40 mg) by mouth every evening  Dispense: 90 tablet; Refill: 3  - venlafaxine (EFFEXOR-XR) 37.5 MG 24 hr capsule; Take 1 capsule (37.5 mg) by mouth daily (with breakfast)  Dispense: 90 capsule; Refill: 1    5. Hyperlipidemia LDL goal <70    - rosuvastatin (CRESTOR) 40 MG tablet; Take 1 tablet (40 mg) by mouth every evening  Dispense: 90 tablet; Refill: 3        Sepideh Burris PA-C  [facility]    "

## 2019-02-12 ENCOUNTER — TELEPHONE (OUTPATIENT)
Dept: OTHER | Facility: CLINIC | Age: 69
End: 2019-02-12

## 2019-02-12 NOTE — TELEPHONE ENCOUNTER
Pt is s/p left femoral to above knee popliteal bypass with PTFE graft on 1/21/19.  Pt needs an OV only, follow up to 1/30/19 appointment with Dr. Owens.    Attempted to reach patient to schedule, LVM requesting pt to call back to schedule appointment.    Nicol Buenrostro, CHERISEN, RN

## 2019-02-13 NOTE — PROGRESS NOTES
Angelinydia Jacobson returns today 9 days status post redo left femoral to above-knee popliteal bypass with 6 mm PTFE graft.  Please see my prior office notes.  She has history of left groin sarcoma resection with lymphadenectomy and irradiation.  She has moderate left leg lymphedema as her baseline and wears a thigh-high prescription compression stocking.  She presents today for an early postoperative check as she and her  hope to travel to [place] this coming weekend for a brief vacation.  At today's visit she is without complaints.    Exam:  Well-developed female in no acute distress.  Moderate left leg lymphedema which does not seem significantly worsened from her baseline.  Left groin and popliteal incisions are healing with sutures intact.  Palpable left-sided pedal pulses.  The left groin ecchymosis noted immediately postop has completely resolved.    IMPRESSION:  POD #9 redo left femoral to above-knee popliteal bypass with PTFE graft overall doing well.  Moderate left leg lymphedema not significantly worsened from her baseline.    RECOMMENDATION:  I had a nice discussion with Angeli and her  regarding all the above.  Her sutures are not yet ready to remove due to the moderate left leg lymphedema.  Her left groin bruising has completely resolved and now does not seem related to Plavix.  I therefore will restart Plavix and maintain her on dual antiplatelet therapy.  She is utilizing her compression wraps and garments baseline.  They have a nonrefundable trip to [place].  We discussed preventative measures to avoid DVT.  She used to avoid swimming or vigorous exercise.  She is to elevate the left leg whenever possible.  Vascular surgical follow-up will be with me in roughly 2 weeks upon their return from their vacation.    Elijah Owens MD

## 2019-02-20 ENCOUNTER — OFFICE VISIT (OUTPATIENT)
Dept: SURGERY | Facility: CLINIC | Age: 69
End: 2019-02-20
Payer: COMMERCIAL

## 2019-02-20 VITALS
DIASTOLIC BLOOD PRESSURE: 78 MMHG | HEART RATE: 84 BPM | BODY MASS INDEX: 23.63 KG/M2 | HEIGHT: 66 IN | SYSTOLIC BLOOD PRESSURE: 112 MMHG | RESPIRATION RATE: 16 BRPM | WEIGHT: 147 LBS | OXYGEN SATURATION: 96 %

## 2019-02-20 DIAGNOSIS — I73.9 PAD (PERIPHERAL ARTERY DISEASE) (H): ICD-10-CM

## 2019-02-20 DIAGNOSIS — T82.898S OCCLUSION OF LEFT FEMOROPOPLITEAL BYPASS GRAFT, SEQUELA: ICD-10-CM

## 2019-02-20 DIAGNOSIS — Z09 SURGICAL FOLLOW-UP CARE: Primary | ICD-10-CM

## 2019-02-20 ASSESSMENT — MIFFLIN-ST. JEOR: SCORE: 1213.54

## 2019-02-20 NOTE — LETTER
"[facility] [address]  [place] [address]  Phone: [phone]  Fax: [phone]    2019    Re: Angeli Jacobson, : 1950    Angeli Jacobson returns today 1 month status post redo left common femoral to above-knee popliteal artery bypass with 6 mm PTFE graft.  Her left leg lymphedema is at its baseline.  She notes some \"stiffness\" in her left leg but is otherwise without complaints.     Exam:  Moderate left leg lymphedema (baseline).  Left groin and medial left thigh incisions are healing nicely.  Sutures were removed today.  2+ palpable left DP pulse.     IMPRESSION:  1 month status post redo left common femoral to above-knee popliteal artery bypass with 6 mm PTFE graft clinically doing well.     RECOMMENDATION:  She will continue her medical regimen including aspirin and Plavix.  She utilizes a thigh-high compression stocking on a daily basis for her chronic lymphedema.  Vascular surgical follow-up will be with me in 3 months for ultrasound of the left femoropopliteal bypass and an ADRIAN with exercise.     Elijah Owens MD      "

## 2019-02-20 NOTE — PROGRESS NOTES
"Angeli Jacobson returns today 1 month status post redo left common femoral to above-knee popliteal artery bypass with 6 mm PTFE graft.  Her left leg lymphedema is at its baseline.  She notes some \"stiffness\" in her left leg but is otherwise without complaints.    Exam:  Moderate left leg lymphedema (baseline).  Left groin and medial left thigh incisions are healing nicely.  Sutures were removed today.  2+ palpable left DP pulse.    IMPRESSION:  1 month status post redo left common femoral to above-knee popliteal artery bypass with 6 mm PTFE graft clinically doing well.    RECOMMENDATION:  She will continue her medical regimen including aspirin and Plavix.  She utilizes a thigh-high compression stocking on a daily basis for her chronic lymphedema.  Vascular surgical follow-up will be with me in 3 months for ultrasound of the left femoropopliteal bypass and an ADRIAN with exercise.    Elijah Owens MD  "

## 2019-02-25 ENCOUNTER — MYC MEDICAL ADVICE (OUTPATIENT)
Dept: FAMILY MEDICINE | Facility: CLINIC | Age: 69
End: 2019-02-25

## 2019-02-25 DIAGNOSIS — Z85.831 HISTORY OF SARCOMA: ICD-10-CM

## 2019-02-25 RX ORDER — VENLAFAXINE HYDROCHLORIDE 37.5 MG/1
37.5 CAPSULE, EXTENDED RELEASE ORAL
Qty: 7 CAPSULE | Refills: 0 | Status: SHIPPED | OUTPATIENT
Start: 2019-02-25 | End: 2019-08-13

## 2019-03-19 ENCOUNTER — TELEPHONE (OUTPATIENT)
Dept: OTHER | Facility: CLINIC | Age: 69
End: 2019-03-19

## 2019-03-19 ENCOUNTER — MYC MEDICAL ADVICE (OUTPATIENT)
Dept: FAMILY MEDICINE | Facility: CLINIC | Age: 69
End: 2019-03-19

## 2019-03-19 DIAGNOSIS — E78.5 HYPERLIPIDEMIA LDL GOAL <70: ICD-10-CM

## 2019-03-19 NOTE — TELEPHONE ENCOUNTER
"Pt called inquiring dose of Zetia she should be taking.  Per Dr. Prince's 1/25/19 note:  \"Assessment:  -Not at goal on Crestor 40 mg and Zetia 5 mg daily -> Zetia increased to 10 mg daily.  -However, somehow upon refill of her Crestor 2 weeks ago dose was changed to 10 mg daily.      Plan:   -Increase Crestor back to 40 mg daily and continue Zetia 10 mg daily.   -Repeat lipid panel in 3 months through PCP. If her lipids are still not at goal (LDL<70), then [facility]. \"    RN discussed above recommendations and pt verbalized understanding.  Pt stated she only received 45 tablets from her mail order pharmacy and stated she will call them to determine why Rx was not filled with 90 tablets.  Pt understands to call with any further questions.  She will also contact PCP to arrange repeat lipid panel as recommended by Dr. Prince.  She is scheduled for ADRIAN and left LE arterial US with Dr. Owens.    Nicol Buenrostro, CHERISEN, RN      "

## 2019-03-20 RX ORDER — EZETIMIBE 10 MG/1
10 TABLET ORAL DAILY
Qty: 90 TABLET | Refills: 3 | Status: SHIPPED | OUTPATIENT
Start: 2019-03-20 | End: 2019-08-14

## 2019-04-22 ENCOUNTER — DOCUMENTATION ONLY (OUTPATIENT)
Dept: LAB | Facility: CLINIC | Age: 69
End: 2019-04-22

## 2019-04-22 ENCOUNTER — MYC MEDICAL ADVICE (OUTPATIENT)
Dept: FAMILY MEDICINE | Facility: CLINIC | Age: 69
End: 2019-04-22

## 2019-04-22 DIAGNOSIS — S72.002A HIP FRACTURE, LEFT, CLOSED, INITIAL ENCOUNTER (H): Primary | ICD-10-CM

## 2019-04-22 NOTE — PROGRESS NOTES
Patient has lab only appointment on 05.01.2019, but there are no future orders. Please place future lab/s or advise patient on lab appointment not needed.    Thank you,  [facility] Staff

## 2019-04-23 ENCOUNTER — TELEPHONE (OUTPATIENT)
Dept: FAMILY MEDICINE | Facility: CLINIC | Age: 69
End: 2019-04-23

## 2019-04-23 NOTE — TELEPHONE ENCOUNTER
"We called pt to check in. Reviewed 4/25 ortho visit.   States she is on pain management.  \"Lots of firsts to learn what I need help with.\"   Traveling 4/24.   seasoned traveler so they have booked hotel near airport and first class tickets so pt is able be be comfortable and move during the flight.   Scheduled OV with PCP 4/30/19.   Jameson Price RN    "

## 2019-04-23 NOTE — TELEPHONE ENCOUNTER
Please call pt as well. See MyChart.  Maybe we can get her to referrals, so they can speed this along ASAP!!!

## 2019-04-23 NOTE — TELEPHONE ENCOUNTER
Patient called looking to speak with Jameson SHARPE     Please call patient back.     Bharti Samano/ANGELICA

## 2019-04-23 NOTE — TELEPHONE ENCOUNTER
Left message to call back. Needs to see ortho within 48 hours of landing.   I will take her call.   Jameson Price, RN

## 2019-04-23 NOTE — TELEPHONE ENCOUNTER
Pt has an appointment with [facility] 4/25 @ 10am with Dr. Santos.    Lm for pt to call Nicolette back.    [phone]  [address] [place] [address]      Nicolette-Referrals  [phone]

## 2019-04-25 ENCOUNTER — TRANSFERRED RECORDS (OUTPATIENT)
Dept: HEALTH INFORMATION MANAGEMENT | Facility: CLINIC | Age: 69
End: 2019-04-25

## 2019-04-29 ENCOUNTER — MYC MEDICAL ADVICE (OUTPATIENT)
Dept: FAMILY MEDICINE | Facility: CLINIC | Age: 69
End: 2019-04-29

## 2019-04-29 DIAGNOSIS — R73.01 IMPAIRED FASTING GLUCOSE: ICD-10-CM

## 2019-04-29 DIAGNOSIS — E78.5 HYPERLIPIDEMIA LDL GOAL <70: Primary | ICD-10-CM

## 2019-04-30 ENCOUNTER — TELEPHONE (OUTPATIENT)
Dept: OTHER | Facility: CLINIC | Age: 69
End: 2019-04-30

## 2019-04-30 ENCOUNTER — OFFICE VISIT (OUTPATIENT)
Dept: FAMILY MEDICINE | Facility: CLINIC | Age: 69
End: 2019-04-30
Payer: COMMERCIAL

## 2019-04-30 VITALS
SYSTOLIC BLOOD PRESSURE: 124 MMHG | BODY MASS INDEX: 24.69 KG/M2 | TEMPERATURE: 97.8 F | WEIGHT: 153 LBS | RESPIRATION RATE: 16 BRPM | DIASTOLIC BLOOD PRESSURE: 77 MMHG | HEART RATE: 80 BPM

## 2019-04-30 DIAGNOSIS — E78.5 HYPERLIPIDEMIA LDL GOAL <70: ICD-10-CM

## 2019-04-30 DIAGNOSIS — R35.0 URINARY FREQUENCY: ICD-10-CM

## 2019-04-30 DIAGNOSIS — S72.002A CLOSED LEFT HIP FRACTURE, INITIAL ENCOUNTER (H): Primary | ICD-10-CM

## 2019-04-30 DIAGNOSIS — R73.01 IMPAIRED FASTING GLUCOSE: ICD-10-CM

## 2019-04-30 LAB
ALBUMIN SERPL-MCNC: 3.3 G/DL (ref 3.4–5)
ALP SERPL-CCNC: 88 U/L (ref 40–150)
ALT SERPL W P-5'-P-CCNC: 70 U/L (ref 0–50)
ANION GAP SERPL CALCULATED.3IONS-SCNC: 6 MMOL/L (ref 3–14)
AST SERPL W P-5'-P-CCNC: 43 U/L (ref 0–45)
BILIRUB SERPL-MCNC: 0.5 MG/DL (ref 0.2–1.3)
BUN SERPL-MCNC: 18 MG/DL (ref 7–30)
CALCIUM SERPL-MCNC: 9.3 MG/DL (ref 8.5–10.1)
CHLORIDE SERPL-SCNC: 107 MMOL/L (ref 94–109)
CHOLEST SERPL-MCNC: 231 MG/DL
CO2 SERPL-SCNC: 27 MMOL/L (ref 20–32)
CREAT SERPL-MCNC: 0.64 MG/DL (ref 0.52–1.04)
GFR SERPL CREATININE-BSD FRML MDRD: >90 ML/MIN/{1.73_M2}
GLUCOSE SERPL-MCNC: 104 MG/DL (ref 70–99)
HDLC SERPL-MCNC: 50 MG/DL
LDLC SERPL CALC-MCNC: 146 MG/DL
NONHDLC SERPL-MCNC: 181 MG/DL
POTASSIUM SERPL-SCNC: 4.2 MMOL/L (ref 3.4–5.3)
PROT SERPL-MCNC: 7.6 G/DL (ref 6.8–8.8)
SODIUM SERPL-SCNC: 140 MMOL/L (ref 133–144)
TRIGL SERPL-MCNC: 177 MG/DL

## 2019-04-30 PROCEDURE — 80053 COMPREHEN METABOLIC PANEL: CPT | Performed by: PHYSICIAN ASSISTANT

## 2019-04-30 PROCEDURE — 80061 LIPID PANEL: CPT | Performed by: PHYSICIAN ASSISTANT

## 2019-04-30 PROCEDURE — 36415 COLL VENOUS BLD VENIPUNCTURE: CPT | Performed by: PHYSICIAN ASSISTANT

## 2019-04-30 PROCEDURE — 99214 OFFICE O/P EST MOD 30 MIN: CPT | Performed by: PHYSICIAN ASSISTANT

## 2019-04-30 RX ORDER — SOLIFENACIN SUCCINATE 5 MG/1
5 TABLET, FILM COATED ORAL DAILY
Qty: 30 TABLET | Refills: 1 | Status: SHIPPED | OUTPATIENT
Start: 2019-04-30 | End: 2019-06-14

## 2019-04-30 ASSESSMENT — PATIENT HEALTH QUESTIONNAIRE - PHQ9
SUM OF ALL RESPONSES TO PHQ QUESTIONS 1-9: 0
SUM OF ALL RESPONSES TO PHQ QUESTIONS 1-9: 0

## 2019-04-30 NOTE — PROGRESS NOTES
SUBJECTIVE:   Angeli Jacobson is a 69 year old female who presents to clinic today for the following health issues:      HPI      Hospital Follow-up Visit:    Hospital/Nursing Home/IP Rehab Facility: [facility], [place], [place]  Date of Admission: 4/13/19  Date of Discharge: 4/22/19  Reason(s) for Admission: Left hip fx             Problems taking medications regularly:  None       Medication changes since discharge: None       Problems adhering to non-medication therapy:  Pt starts PT on Thursday    Summary of hospitalization:  See outside records, reviewed and scanned  Diagnostic Tests/Treatments reviewed.  Follow up needed: P.T., orthopedics  Other Healthcare Providers Involved in Patient s Care:         P.T., orthopedics  Update since discharge: improved.     Post Discharge Medication Reconciliation: discharge medications reconciled, continue medications without change.  Plan of care communicated with patient and family    Patient has seen Dr. Chris Celeste. Had normal MRI of left knee, but knee is painful. Hip seems to be fine. Pain controlled with tylenol.    Patient reports ongoing urge incontinence. Has seen urology and GYN. This is been more bothersome due to limited mobility with hip fracture/ s/p ORIF of hip. Wondering if there is any help for this temporarily.      Coding guidelines for this visit:  Type of Medical   Decision Making Face-to-Face Visit       within 7 Days of discharge Face-to-Face Visit        within 14 days of discharge   Moderate Complexity [id number]   High Complexity [id number]            Additional history: as documented    Reviewed and updated as needed this visit by clinical staff  Tobacco  Allergies  Meds  Med Hx  Surg Hx  Fam Hx  Soc Hx        Reviewed and updated as needed this visit by Provider             Patient Active Problem List   Diagnosis     Pain in joint, multiple sites     MULTINODUL GOITER     Hearing loss     Hyperlipidemia LDL goal <70      Osteoporosis     Cervicalgia     Major depressive disorder, recurrent episode, moderate (H)     Impaired fasting glucose     Lymphedema of left leg     Tubular adenoma     Other constipation     Vertigo     Myxoid liposarcoma (HCC)     PAD (peripheral artery disease) (H)     Vascular graft occlusion (H)     Femoral-popliteal bypass graft occlusion, left (H)     History of sarcoma     Past Surgical History:   Procedure Laterality Date     BYPASS GRAFT FEMOROPOPLITEAL Left 1/21/2019    Procedure: LEFT FEMORAL TO ABOVE KNEE POPLITEAL  BYPASS WITH POLYTETRAFLUOROETHYLENE GRAFT;  Surgeon: Shade Owens MD;  Location: [facility] OR     C APPENDECTOMY      Appendectomy     C NONSPECIFIC PROCEDURE  04/2000    Open Biopsy Left Thigh Liposarcoma     COLONOSCOPY N/A 2/5/2016    Procedure: COMBINED COLONOSCOPY, SINGLE OR MULTIPLE BIOPSY/POLYPECTOMY BY BIOPSY;  Surgeon: Varun Stanley MD, MD;  Location:  GI     EXCISION MALIG LESION>1.25CM  5/2000    Myxoid Liposarcoma       EXPLORE GROIN Right 5/1/2018    Procedure: EXPLORE GROIN;  EMERGENCY RIGHT FEMORAL EXPLORATION WITH FEMORAL ARTERY REPAIR.    EBL: 50mL;  Surgeon: Shade Owens MD;  Location: [facility] OR     HC COLONOSCOPY THRU STOMA, DIAGNOSTIC  2006    due 2010     HC COLP CERVIX/UPPER VAGINA  07/1997    Negative     HC DILATION/CURETTAGE DIAG/THER NON OB  02/1997    Post menopausal bleeding on HRT, negative     HIP SURGERY Left 04/13/2019     IR ANGIOGRAM THROUGH CATHETER FOLLOW UP  12/20/2018     IR ANGIOGRAM THROUGH CATHETER FOLLOW UP  12/21/2018     IR LOWER EXTREMITY ANGIOGRAM LEFT  12/19/2018     VASCULAR SURGERY  04/30/2018    Left SFA stent in bypass graft       Social History     Tobacco Use     Smoking status: Never Smoker     Smokeless tobacco: Never Used   Substance Use Topics     Alcohol use: No     Family History   Problem Relation Age of Onset     C.A.D. Father         MI 57     Alcohol/Drug Father         etoh     Obesity Mother      Osteoporosis Mother       Colon Cancer Brother 70     Hyperlipidemia Son      Hyperlipidemia Son         very high, experimental drug     C.A.D. Paternal Grandmother         ascvd     Diabetes Maternal Grandmother      Cancer Maternal Grandmother      C.A.D. Paternal Uncle         Mi  age 48     Cancer Maternal Aunt         pancreatic [place]           ROS:  Constitutional, HEENT, cardiovascular, pulmonary, gi and gu systems are negative, except as otherwise noted.    OBJECTIVE:     /77 (BP Location: Right arm, Patient Position: Chair, Cuff Size: Adult Regular)   Pulse 80   Temp 97.8  F (36.6  C) (Oral)   Resp 16   Wt 69.4 kg (153 lb)   LMP 2005   BMI 24.69 kg/m    Body mass index is 24.69 kg/m .  GENERAL APPEARANCE: healthy, alert and no distress  RESP: lungs clear to auscultation - no rales, rhonchi or wheezes  CV: regular rates and rhythm, normal S1 S2, no S3 or S4 and no murmur, click or rub  MS: left leg with healed surgical incisions, mild resolving ecchymosis and edema   PSYCH: mentation appears normal and affect normal/bright        ASSESSMENT/PLAN:             1. Closed left hip fracture, initial encounter (H)  Continue f/u with ortho and P.T.  If she needs refill of oxycodone for travel, she certainly may call her and I will Rx this.  Declined today, but they have upcoming trip to [place].     2. Urinary frequency  Risks/benefits of medication use discussed with patient. Patient reports understanding and accepts trial of medication.  F/u with Uro/Gyn  - solifenacin (VESICARE) 5 MG tablet; Take 1 tablet (5 mg) by mouth daily  Dispense: 30 tablet; Refill: 1    3. Hyperlipidemia LDL goal <70  Due for labs.   - Lipid panel reflex to direct LDL Fasting  - **Comprehensive metabolic panel FUTURE anytime    4. Impaired fasting glucose    - **Comprehensive metabolic panel FUTURE anytime        Sepideh Burris PA-C  [facility]  Answers for HPI/ROS submitted by the patient on 2019   PHQ9  TOTAL SCORE: 0

## 2019-04-30 NOTE — TELEPHONE ENCOUNTER
Pt inquired about labs being completed today with PCP and if the labs are the same that Dr. Prince had requested pt follow up with.  Chart reviewed.  Dr. Prince had requested pt follow up with PCP with repeat lipid panel.  Provided instructions to continue fasting today.  Pt updated and had no further questions at this time.  Nicol Buenrsotro, CHERISEN, RN

## 2019-05-01 ENCOUNTER — MYC MEDICAL ADVICE (OUTPATIENT)
Dept: FAMILY MEDICINE | Facility: CLINIC | Age: 69
End: 2019-05-01

## 2019-05-01 ASSESSMENT — PATIENT HEALTH QUESTIONNAIRE - PHQ9: SUM OF ALL RESPONSES TO PHQ QUESTIONS 1-9: 0

## 2019-05-02 ENCOUNTER — THERAPY VISIT (OUTPATIENT)
Dept: PHYSICAL THERAPY | Facility: CLINIC | Age: 69
End: 2019-05-02
Payer: COMMERCIAL

## 2019-05-02 ENCOUNTER — TELEPHONE (OUTPATIENT)
Dept: FAMILY MEDICINE | Facility: CLINIC | Age: 69
End: 2019-05-02

## 2019-05-02 DIAGNOSIS — Z98.890 S/P ORIF (OPEN REDUCTION INTERNAL FIXATION) FRACTURE: ICD-10-CM

## 2019-05-02 DIAGNOSIS — M25.552 HIP PAIN, LEFT: ICD-10-CM

## 2019-05-02 DIAGNOSIS — Z87.81 S/P ORIF (OPEN REDUCTION INTERNAL FIXATION) FRACTURE: ICD-10-CM

## 2019-05-02 DIAGNOSIS — N39.46 MIXED INCONTINENCE URGE AND STRESS (MALE)(FEMALE): Primary | ICD-10-CM

## 2019-05-02 PROCEDURE — 97161 PT EVAL LOW COMPLEX 20 MIN: CPT | Mod: GP | Performed by: PHYSICAL THERAPIST

## 2019-05-02 PROCEDURE — 97110 THERAPEUTIC EXERCISES: CPT | Mod: GP | Performed by: PHYSICAL THERAPIST

## 2019-05-02 PROCEDURE — 97530 THERAPEUTIC ACTIVITIES: CPT | Mod: GP | Performed by: PHYSICAL THERAPIST

## 2019-05-02 ASSESSMENT — ACTIVITIES OF DAILY LIVING (ADL)
DEEP_SQUATTING: UNABLE TO DO
SITTING_FOR_15_MINUTES: SLIGHT DIFFICULTY
WALKING_15_MINUTES_OR_GREATER: UNABLE TO DO
STANDING_FOR_15_MINUTES: SLIGHT DIFFICULTY
WALKING_UP_STEEP_HILLS: UNABLE TO DO
PUTTING_ON_SOCKS_AND_SHOES: UNABLE TO DO
HOS_ADL_SCORE(%): 35.29
GETTING_INTO_AND_OUT_OF_AN_AVERAGE_CAR: MODERATE DIFFICULTY
GETTING_INTO_AND_OUT_OF_A_BATHTUB: MODERATE DIFFICULTY
WALKING_INITIALLY: SLIGHT DIFFICULTY
GOING_UP_1_FLIGHT_OF_STAIRS: MODERATE DIFFICULTY
STEPPING_UP_AND_DOWN_CURBS: SLIGHT DIFFICULTY
RECREATIONAL_ACTIVITIES: UNABLE TO DO
HOS_ADL_COUNT: 17
TWISTING/PIVOTING_ON_INVOLVED_LEG: UNABLE TO DO
HOW_WOULD_YOU_RATE_YOUR_CURRENT_LEVEL_OF_FUNCTION_DURING_YOUR_USUAL_ACTIVITIES_OF_DAILY_LIVING_FROM_0_TO_100_WITH_100_BEING_YOUR_LEVEL_OF_FUNCTION_PRIOR_TO_YOUR_HIP_PROBLEM_AND_0_BEING_THE_INABILITY_TO_PERFORM_ANY_OF_YOUR_USUAL_DAILY_ACTIVITIES?: 20
HEAVY_WORK: UNABLE TO DO
HOS_ADL_ITEM_SCORE_TOTAL: 24
ROLLING_OVER_IN_BED: UNABLE TO DO
GOING_DOWN_1_FLIGHT_OF_STAIRS: MODERATE DIFFICULTY
HOS_ADL_HIGHEST_POTENTIAL_SCORE: 68
WALKING_DOWN_STEEP_HILLS: UNABLE TO DO
WALKING_APPROXIMATELY_10_MINUTES: NO DIFFICULTY AT ALL
LIGHT_TO_MODERATE_WORK: SLIGHT DIFFICULTY

## 2019-05-02 NOTE — TELEPHONE ENCOUNTER
Message from [facility] pharmacist:  Vesicare not covered on plan. Alternative requested.        Prior Authorization Specialty Medication Request    Medication/Dose: solifenacin (VESICARE) 5 MG tablet  ICD code (if different than what is on RX):    Previously Tried and Failed:      Important Lab Values:   Rationale:     Insurance Name: pharmacy did not provide insurance information   Insurance ID:   Insurance Phone Number:     Pharmacy Information (if different than what is on RX)  Name:    Phone:
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
## 2019-05-02 NOTE — TELEPHONE ENCOUNTER
Sepideh- see Context app message below.  Verified and med list is correct.  Please advise.  Reyna Luciano, RN    Written by Sepideh Burris PA-C on 5/1/2019  8:07 AM   Angeli,     Your cholesterol: total, LDL and non HDL were all high. Our goal is to have the LDL below 70 to reduce cardiovascular risks.   I just want to make sure you are taking Zetia 10 mg (ezetimibe) AND Crestor 40 mg (rouvastatin) daily.  Please let me know if this is correct.     Thank You,   Sepideh Burris PA-C
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
## 2019-05-03 RX ORDER — OXYBUTYNIN CHLORIDE 5 MG/1
5 TABLET, EXTENDED RELEASE ORAL DAILY
Qty: 30 TABLET | Refills: 1 | Status: SHIPPED | OUTPATIENT
Start: 2019-05-03 | End: 2019-05-30

## 2019-05-03 NOTE — TELEPHONE ENCOUNTER
Please call pt. Vesicare not covered. Will Rx oxybutynin, it will do the same thing.    Sepideh Burris PA-C

## 2019-05-03 NOTE — TELEPHONE ENCOUNTER
Pt informed and agrees to oxybutynin that Sepideh sent to pharmacy this morning..    Jameson Price RN

## 2019-05-06 ENCOUNTER — MYC MEDICAL ADVICE (OUTPATIENT)
Dept: FAMILY MEDICINE | Facility: CLINIC | Age: 69
End: 2019-05-06

## 2019-05-06 ENCOUNTER — THERAPY VISIT (OUTPATIENT)
Dept: PHYSICAL THERAPY | Facility: CLINIC | Age: 69
End: 2019-05-06
Payer: COMMERCIAL

## 2019-05-06 DIAGNOSIS — I73.9 PAD (PERIPHERAL ARTERY DISEASE) (H): ICD-10-CM

## 2019-05-06 DIAGNOSIS — M25.552 HIP PAIN, LEFT: ICD-10-CM

## 2019-05-06 DIAGNOSIS — Z98.890 S/P ORIF (OPEN REDUCTION INTERNAL FIXATION) FRACTURE: ICD-10-CM

## 2019-05-06 DIAGNOSIS — Z87.81 S/P ORIF (OPEN REDUCTION INTERNAL FIXATION) FRACTURE: ICD-10-CM

## 2019-05-06 DIAGNOSIS — Z85.831 HISTORY OF SARCOMA: ICD-10-CM

## 2019-05-06 PROCEDURE — 97110 THERAPEUTIC EXERCISES: CPT | Mod: GP | Performed by: PHYSICAL THERAPIST

## 2019-05-06 PROCEDURE — 97530 THERAPEUTIC ACTIVITIES: CPT | Mod: GP | Performed by: PHYSICAL THERAPIST

## 2019-05-06 NOTE — LETTER
May 7, 2019      Angeli Jacobson  [address] [facility] [address]        To Whom It May Concern,     Angeli Jacobson is under our care. Please allow her to cancel her AirBNB reservation without penalty. She has had a recent hip fracture and it is impossible and against medical advice for her to climb the stairs at this property    If you have questions or concerns, please call the clinic at the number listed above.    Sincerely,         Sepideh Burris PA-C

## 2019-05-07 ENCOUNTER — MYC MEDICAL ADVICE (OUTPATIENT)
Dept: FAMILY MEDICINE | Facility: CLINIC | Age: 69
End: 2019-05-07

## 2019-05-07 ENCOUNTER — HOSPITAL ENCOUNTER (OUTPATIENT)
Dept: OCCUPATIONAL THERAPY | Facility: CLINIC | Age: 69
Setting detail: THERAPIES SERIES
End: 2019-05-07
Attending: ORTHOPAEDIC SURGERY
Payer: COMMERCIAL

## 2019-05-07 DIAGNOSIS — Z78.9 RECENT FOREIGN TRAVEL: Primary | ICD-10-CM

## 2019-05-07 PROCEDURE — 97166 OT EVAL MOD COMPLEX 45 MIN: CPT | Mod: GO | Performed by: OCCUPATIONAL THERAPIST

## 2019-05-07 PROCEDURE — 97140 MANUAL THERAPY 1/> REGIONS: CPT | Mod: GO | Performed by: OCCUPATIONAL THERAPIST

## 2019-05-07 RX ORDER — CEPHALEXIN 500 MG/1
500 CAPSULE ORAL 3 TIMES DAILY
Qty: 21 CAPSULE | Refills: 0 | Status: SHIPPED | OUTPATIENT
Start: 2019-05-07 | End: 2019-06-04

## 2019-05-07 RX ORDER — OXYCODONE HYDROCHLORIDE 5 MG/1
5 TABLET ORAL EVERY 4 HOURS PRN
Qty: 20 TABLET | Refills: 0 | Status: SHIPPED | OUTPATIENT
Start: 2019-05-07 | End: 2019-05-14

## 2019-05-08 ENCOUNTER — HOSPITAL ENCOUNTER (OUTPATIENT)
Dept: ULTRASOUND IMAGING | Facility: CLINIC | Age: 69
Discharge: HOME | End: 2019-05-08
Attending: SURGERY | Admitting: SURGERY
Payer: COMMERCIAL

## 2019-05-08 ENCOUNTER — MYC MEDICAL ADVICE (OUTPATIENT)
Dept: FAMILY MEDICINE | Facility: CLINIC | Age: 69
End: 2019-05-08

## 2019-05-08 ENCOUNTER — OFFICE VISIT (OUTPATIENT)
Dept: SURGERY | Facility: CLINIC | Age: 69
End: 2019-05-08
Attending: SURGERY
Payer: COMMERCIAL

## 2019-05-08 ENCOUNTER — HOSPITAL ENCOUNTER (OUTPATIENT)
Dept: ULTRASOUND IMAGING | Facility: CLINIC | Age: 69
End: 2019-05-08
Attending: SURGERY
Payer: COMMERCIAL

## 2019-05-08 VITALS
OXYGEN SATURATION: 97 % | HEART RATE: 74 BPM | BODY MASS INDEX: 24.59 KG/M2 | RESPIRATION RATE: 16 BRPM | DIASTOLIC BLOOD PRESSURE: 76 MMHG | WEIGHT: 153 LBS | HEIGHT: 66 IN | SYSTOLIC BLOOD PRESSURE: 124 MMHG

## 2019-05-08 DIAGNOSIS — I73.9 PAD (PERIPHERAL ARTERY DISEASE) (H): ICD-10-CM

## 2019-05-08 DIAGNOSIS — I73.9 PERIPHERAL VASCULAR DISEASE WITH CLAUDICATION (H): ICD-10-CM

## 2019-05-08 DIAGNOSIS — Z95.828 HISTORY OF ARTERIAL BYPASS OF LOWER EXTREMITY: Primary | ICD-10-CM

## 2019-05-08 DIAGNOSIS — T82.898S OCCLUSION OF LEFT FEMOROPOPLITEAL BYPASS GRAFT, SEQUELA: ICD-10-CM

## 2019-05-08 PROCEDURE — 99213 OFFICE O/P EST LOW 20 MIN: CPT | Performed by: SURGERY

## 2019-05-08 PROCEDURE — 93926 LOWER EXTREMITY STUDY: CPT | Mod: LT

## 2019-05-08 PROCEDURE — 93922 UPR/L XTREMITY ART 2 LEVELS: CPT

## 2019-05-08 RX ORDER — CLOPIDOGREL BISULFATE 75 MG/1
75 TABLET ORAL DAILY
Qty: 90 TABLET | Refills: 3 | Status: SHIPPED | OUTPATIENT
Start: 2019-05-08 | End: 2020-04-02

## 2019-05-08 ASSESSMENT — MIFFLIN-ST. JEOR: SCORE: 1235.75

## 2019-05-08 NOTE — LETTER
[facility] [address]  [place] [address]  Phone: [phone]  Fax: [phone]      May 10, 2019      Re:  Angeli Jacobson  :  1950      Angeli Jacobson returns today 4 months status post redo left common femoral to above-knee popliteal artery bypass with 6 mm PTFE graft.  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  She has moderate left leg lymphedema as her baseline and wears a thigh-high prescription compression stocking for this.  She presents today for a 3-month postoperative left leg graft ultrasound and an ADRIAN with exercise.     She fell in mid April with a resultant left hip fracture that was surgically repaired in [place].  She is recovering from that event and also continues to complain of left knee pain.  She is being evaluated by Dr. Chris Celeste for these concerns.  The left hip injury exacerbated her chronic left leg lymphedema.  She has returned to the lymphedema clinic and is again utilizing a wraps and her custom stocking which seem to be helping.     Exam:  Well-developed female in no acute distress.  Vital signs reviewed.  I did not take down her left leg wraps or her compression stocking to examine left leg pedal pulses today.  Previously her left DP pulse was palpable and her noninvasive studies today are normal.     Imaging:  Resting ABIs are normal bilaterally.     Ultrasound demonstrates a widely patent left femoropopliteal bypass graft     ASSESSMENT:  1.  4 months status post redo left femoral to above-knee popliteal bypass with 6 mm PTFE graft doing well from the vascular surgical standpoint.     2.  Chronic left leg lymphedema     3.  Recent left hip fracture requiring surgical repair     RECOMMENDATION:  I reviewed all the above with Mrs. Jacobson and her .  I have no vascular surgical concerns.  She will continue her medical regimen including  aspirin and Plavix.  Vascular surgical follow-up will be with me in 6 months for a repeat left leg graft ultrasound and an ADRIAN with exercise.     Elijah Owens MD

## 2019-05-08 NOTE — PROGRESS NOTES
[facility]        OUTPATIENT OCCUPATIONAL THERAPY EDEMA EVALUATION  PLAN OF TREATMENT FOR OUTPATIENT REHABILITATION  (COMPLETE FOR INITIAL CLAIMS ONLY)  Patient's Last Name, First Name, TURNERIVAN  KavonAngeli  PARTH                           Provider s Name:   [facility] Medical Record No.  [id number]     Start of Care Date:  05/07/19   Onset Date:  05/07/18   Type:  OT   Medical Diagnosis:  Lymphedema   Therapy Diagnosis:  Lymphedema Visits from SOC:  1                                     __________________________________________________________________________________   Plan of Treatment/Functional Goals:    Manual lymph drainage, Gradient compression bandaging, Fit for compression garment, Exercises, Precautions to prevent infection / exacerbation, Education, ADL training, Skin care / precautions, Myofascial release, Home management program development        GOALS  1. Goal description: Pt will be independent with modifications to home program to contain her lymphedema at its previous size.       Target date: 07/12/19  2. Goal description: Pt will be independent for a plan for travel to [place] with lymphedema program.       Target date: 05/07/19  3. Goal description: Pt will reduce the leg 50% of swelling to resume her home program and reduce the risk for infection.       Target date: 07/12/19              Treatment frequency: 5 times / week   Treatment duration:  once a week at eval, then 0x/week for 4 weeks, then 5x/week for 2 weeks, then 0x/week for 3 weeks and once a week for one week.    Amy Guzman OT                                    I CERTIFY THE NEED FOR THESE SERVICES FURNISHED UNDER        THIS PLAN OF TREATMENT AND WHILE UNDER MY CARE .             Physician Signature               Date    X_____________________________________________________                         Certification date from: 05/07/19       Certification date to: 07/12/19           Referring physician: Dr. Spencer Celeste   Initial Assessment  See Epic Evaluation- Start of care: 05/07/19

## 2019-05-08 NOTE — PROGRESS NOTES
05/07/19 1200   Quick Adds   Quick Adds Certification   Rehab Discipline   Discipline OT   Type of Visit   Type of visit Initial Edema Evaluation       present No   General Information   Start of care 05/07/19   Referring physician Dr. Spencer Celeste   Orders Evaluate and treat as indicated   Order date 05/03/19   Medical diagnosis Lymphedema   Onset of illness / date of surgery 05/07/00   Edema onset 05/07/18   Affected body parts LLE;Trunk   Edema etiology Cancer with lymph node dissection;GISSELL;Infection;Chronic Venous Insufficiency   Location - Cancer with lymph node dissection L inguinal liposarcama surgery 2000   Edema etiology comments Pt started swelling in 2000.  She had lymphedema tx and did well until infection 2006 when therapy was repeated.  She reports this flare started about one year ago when she started have surgery for the bypass graft, clearing the artifical graft in the L thigh. It has been returning gradually and worsened with recent hip surgery 4/19.   Pertinent history of current problem (PT: include personal factors and/or comorbidities that impact the POC; OT: include additional occupational profile info) L hip ORIF recent, PAD, vascular graft occlusion, sarcoma   Surgical / medical history reviewed Yes   Edema special tests Ultrasound   Prior level of functional mobility Pt lives with her  at home. He is currently doing the grocery, cleaning, and meals.   Prior treatment Complete decongestive therapy;Compression garments;Exercise;Elevation;MLD;Gradient compression bandaging   Community support Family / friend caregiver   Patient role / employment history Retired   Psychosocial concerns Impaired coping   Living environment House / [facility]   Living environment comments Laundry on top level.   Current assistive devices Quad cane   General observations Pt ambulates slowly.   Fall Risk Screen   Fall screen completed by OT   Have you fallen 2 or more times in the  past year? No   Have you fallen and had an injury in the past year? Yes   Is patient a fall risk? No   Fall screen comments Pt has had PT since fall/hip surgery.   Abuse Screen (yes response referral indicated)   Feels Unsafe at Home or Work/School no   Feels Threatened by Someone no   Does Anyone Try to Keep You From Having Contact with Others or Doing Things Outside Your Home? no   Physical Signs of Abuse Present no   System Outcome Measures   Outcome Measures Lymphedema   Lymphedema Life Impact Scale (score range 0-72). A higher score indicates greater impairment. 17   Subjective Report   Patient report of symptoms Pt reports the leg is larger and harder with her recent surgeries.  She wants to know updates to therapy that can make her leg smaller.  She is also leaving for [place] next week and wants to know any travel precautions.   Precautions   Precautions comments none   Patient / Family Goals   Patient / family goals statement Pt wishes to have a better home program to reduce her leg back to where it was smaller and more comfortable.   Pain   Patient currently in pain Yes   Pain location knee to thigh   Pain rating 2/10   Pain description comment pulling with some movment.   Cognitive Status   Orientation Orientation to person, place and time   Level of consciousness Alert   Follows commands and answers questions 100% of the time   Personal safety and judgement Intact   Memory Intact   Edema Exam / Assessment   Skin condition Pitting;Intact   Skin condition comments Pt has hard fibrosis from ankle to hip   Pitting 3+   Pitting location pretibial   Scar Yes   Location The whole hip is affected and the inguinal scars   Mobility Pt is hard throughout, unsure if scar is more sticky than whole leg   Capillary refill Symmetrical   Dorsal pedal pulse Symmetrical   Stemmer sign Positive   Stemmer sign comments In LLE   Ulceration No   Girth Measurements   Girth Measurements Refer to separate girth measurement  flowsheet   Volume LE   Right LE (mL) 5717   Left LE (mL) 7360   LE volume comparison LLE volume greater than RLE volume   % difference 22   Range of Motion   ROM Other   ROM comments Decreased in LLE - is seeing PT   Strength   Strength Other   Strength comments Pt is very slow with walking.   Posture   Posture Normal   Palpation   Palpation No pain on palpation.   Activities of Daily Living   Activities of Daily Living Pt has assist with GCB of L foot from her .  He is also helping with cooking, cleaning and grocery shopping.  They leave for [place] next week.   Bed Mobility   Bed mobility Pt is independent.   Transfers   Transfers Pt is independent.   Gait / Locomotion   Gait / Locomotion Pt is very slow to ambulate clinic distance.   Sensory   Sensory perception Light touch   Light touch Impaired   Sensory perception comments There is some dullness on the lateral aspect of the foot.   Vascular Assessment   Vascular Assessment Vascular concerns   Coordination   Coordination Gross motor coordination appropriate   Muscle Tone   Muscle tone No deficits were identified   Planned Edema Interventions   Planned edema interventions Manual lymph drainage;Gradient compression bandaging;Fit for compression garment;Exercises;Precautions to prevent infection / exacerbation;Education;ADL training;Skin care / precautions;Myofascial release;Home management program development   Clinical Impression   Criteria for skilled therapeutic intervention met Yes   Therapy diagnosis Lymphedema   Influenced by the following impairments / conditions Edema;Stage 2   Assessment of Occupational Performance 3-5 Performance Deficits   Identified Performance Deficits pain, heaviness, moving limited, reaching to feet.   Clinical Decision Making (Complexity) Moderate complexity   Treatment frequency 5 times / week   Treatment duration  once a week at al, then 0x/week for 4 weeks, then 5x/week for 2 weeks, then 0x/week for 3 weeks and once a  week for one week.   Patient / family and/or staff in agreement with plan of care Yes   Risks and benefits of therapy have been explained Yes   Education Assessment   Preferred learning style Listening;Reading;Demonstration;Pictures / video   Barriers to learning Physical   Goals   Edema Eval Goals 1;2;3   Goal 1   Goal identifier 1   Goal description Pt will be independent with modifications to home program to contain her lymphedema at its previous size.   Target date 07/12/19   Goal 2   Goal identifier 2   Goal description Pt will be independent for a plan for travel to [place] with lymphedema program.   Target date 05/07/19   Date met 05/07/19   Goal 3   Goal identifier 3   Goal description Pt will reduce the leg 50% of swelling to resume her home program and reduce the risk for infection.   Target date 07/12/19   Total Evaluation Time   OT Rosanne, Moderate Complexity Minutes (14442) 30   Certification   Certification date from 05/07/19   Certification date to 07/12/19   Medical Diagnosis Lymphedema   Certification I certify the need for these services furnished under this plan of treatment and while under my care.  (Physician co-signature of this document indicates review and certification of the therapy plan).

## 2019-05-08 NOTE — PROGRESS NOTES
Angeli ALBA Kavon returns today 4 months status post redo left common femoral to above-knee popliteal artery bypass with 6 mm PTFE graft.  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  She has moderate left leg lymphedema as her baseline and wears a thigh-high prescription compression stocking for this.  She presents today for a 3-month postoperative left leg graft ultrasound and an ADRIAN with exercise.    She fell in mid April with a resultant left hip fracture that was surgically repaired in [place].  She is recovering from that event and also continues to complain of left knee pain.  She is being evaluated by Dr. Chris Celeste for these concerns.  The left hip injury exacerbated her chronic left leg lymphedema.  She has returned to the lymphedema clinic and is again utilizing a wraps and her custom stocking which seem to be helping.    Exam:  Well-developed female in no acute distress.  Vital signs reviewed.  I did not take down her left leg wraps or her compression stocking to examine left leg pedal pulses today.  Previously her left DP pulse was palpable and her noninvasive studies today are normal.    Imaging:  Resting ABIs are normal bilaterally.    Ultrasound demonstrates a widely patent left femoropopliteal bypass graft    ASSESSMENT:  1.  4 months status post redo left femoral to above-knee popliteal bypass with 6 mm PTFE graft doing well from the vascular surgical standpoint.    2.  Chronic left leg lymphedema    3.  Recent left hip fracture requiring surgical repair    RECOMMENDATION:  I reviewed all the above with Mrs. Jacobson and her .  I have no vascular surgical concerns.  She will continue her medical regimen including aspirin and Plavix.  Vascular surgical follow-up will be with me in 6 months for a repeat left leg graft ultrasound and an ADRIAN with exercise.    Total face-to-face time was 15 minutes, greater than 50% spent  providing counseling and education.    Elijah Owens MD

## 2019-05-09 ENCOUNTER — THERAPY VISIT (OUTPATIENT)
Dept: PHYSICAL THERAPY | Facility: CLINIC | Age: 69
End: 2019-05-09
Payer: COMMERCIAL

## 2019-05-09 DIAGNOSIS — M25.552 HIP PAIN, LEFT: ICD-10-CM

## 2019-05-09 DIAGNOSIS — Z98.890 S/P ORIF (OPEN REDUCTION INTERNAL FIXATION) FRACTURE: ICD-10-CM

## 2019-05-09 DIAGNOSIS — Z87.81 S/P ORIF (OPEN REDUCTION INTERNAL FIXATION) FRACTURE: ICD-10-CM

## 2019-05-09 PROCEDURE — 97530 THERAPEUTIC ACTIVITIES: CPT | Mod: GP | Performed by: PHYSICAL THERAPIST

## 2019-05-09 PROCEDURE — 97110 THERAPEUTIC EXERCISES: CPT | Mod: GP | Performed by: PHYSICAL THERAPIST

## 2019-05-13 ENCOUNTER — THERAPY VISIT (OUTPATIENT)
Dept: PHYSICAL THERAPY | Facility: CLINIC | Age: 69
End: 2019-05-13
Payer: COMMERCIAL

## 2019-05-13 DIAGNOSIS — Z98.890 S/P ORIF (OPEN REDUCTION INTERNAL FIXATION) FRACTURE: ICD-10-CM

## 2019-05-13 DIAGNOSIS — M25.552 HIP PAIN, LEFT: ICD-10-CM

## 2019-05-13 DIAGNOSIS — Z87.81 S/P ORIF (OPEN REDUCTION INTERNAL FIXATION) FRACTURE: ICD-10-CM

## 2019-05-13 PROCEDURE — 97530 THERAPEUTIC ACTIVITIES: CPT | Mod: GP | Performed by: PHYSICAL THERAPIST

## 2019-05-13 PROCEDURE — 97110 THERAPEUTIC EXERCISES: CPT | Mod: GP | Performed by: PHYSICAL THERAPIST

## 2019-05-14 DIAGNOSIS — Z85.831 HISTORY OF SARCOMA: ICD-10-CM

## 2019-05-14 DIAGNOSIS — I73.9 PAD (PERIPHERAL ARTERY DISEASE) (H): ICD-10-CM

## 2019-05-14 RX ORDER — OXYCODONE HYDROCHLORIDE 5 MG/1
5 TABLET ORAL EVERY 4 HOURS PRN
Qty: 20 TABLET | Refills: 0 | Status: SHIPPED | OUTPATIENT
Start: 2019-05-14 | End: 2019-06-14

## 2019-05-14 NOTE — TELEPHONE ENCOUNTER
Sepideh, please huddle if we are to add chronic pain to problem list.      Controlled Substance Refill Request for oxycodone 5 mg tabs    Last refill: 5/7/19 #20    Last clinic visit: 4/30/19     Clinic visit frequency required: Q 3 months  Next appt: --    Controlled substance agreement on file: No.    Documentation in problem list reviewed:  chronic pain not on problem list    Processing:  e-Rx    RX [place] reviewed: report run 5/14/19 and is placed in Sepideh's office  [place] profile:  [email or web address]  Jameson Price RN

## 2019-05-14 NOTE — TELEPHONE ENCOUNTER
Controlled Substance Refill Request for   Problem List Complete:  No       Last Written Prescription Date:  05/07/2019  Last Fill Quantity: 20,   # refills: 0      [place] [facility] [place]: 04/30/2019    Future Office visit:     Controlled substance agreement:   Encounter-Level CSA:    There are no encounter-level csa.     Patient-Level CSA:    There are no patient-level csa.         Last Urine Drug Screen: No results found for: CDAUT, No results found for: COMDAT, No results found for: THC13, PCP13, COC13, MAMP13, OPI13, AMP13, BZO13, TCA13, MTD13, BAR13, OXY13, PPX13, BUP13     Processing:  Fax Rx to [facility] pharmacy     [email or web address]       checked in past 3 months?  No, route to ANNEMARIE Wilson     Patient states they are leaving town at 1:00 today out of state for two weeks.  They miss counted the medication and request ASAP.

## 2019-05-24 DIAGNOSIS — I73.9 PERIPHERAL ARTERIAL DISEASE (H): Primary | ICD-10-CM

## 2019-05-24 DIAGNOSIS — T82.898D OCCLUSION OF LEFT FEMOROPOPLITEAL BYPASS GRAFT, SUBSEQUENT ENCOUNTER: ICD-10-CM

## 2019-05-31 ENCOUNTER — MYC MEDICAL ADVICE (OUTPATIENT)
Dept: FAMILY MEDICINE | Facility: CLINIC | Age: 69
End: 2019-05-31

## 2019-05-31 DIAGNOSIS — N39.46 MIXED INCONTINENCE URGE AND STRESS (MALE)(FEMALE): ICD-10-CM

## 2019-05-31 RX ORDER — OXYBUTYNIN CHLORIDE 5 MG/1
5 TABLET, EXTENDED RELEASE ORAL DAILY
Qty: 90 TABLET | Refills: 1 | Status: SHIPPED | OUTPATIENT
Start: 2019-05-31 | End: 2019-08-13

## 2019-06-03 ENCOUNTER — HOSPITAL ENCOUNTER (OUTPATIENT)
Dept: OCCUPATIONAL THERAPY | Facility: CLINIC | Age: 69
Setting detail: THERAPIES SERIES
End: 2019-06-03
Attending: ORTHOPAEDIC SURGERY
Payer: COMMERCIAL

## 2019-06-03 PROCEDURE — 97140 MANUAL THERAPY 1/> REGIONS: CPT | Mod: GO | Performed by: OCCUPATIONAL THERAPIST

## 2019-06-04 ENCOUNTER — HOSPITAL ENCOUNTER (EMERGENCY)
Facility: CLINIC | Age: 69
Discharge: HOME | End: 2019-06-04
Attending: EMERGENCY MEDICINE | Admitting: EMERGENCY MEDICINE
Payer: COMMERCIAL

## 2019-06-04 ENCOUNTER — OFFICE VISIT (OUTPATIENT)
Dept: URGENT CARE | Facility: URGENT CARE | Age: 69
End: 2019-06-04
Payer: COMMERCIAL

## 2019-06-04 ENCOUNTER — TELEPHONE (OUTPATIENT)
Dept: FAMILY MEDICINE | Facility: CLINIC | Age: 69
End: 2019-06-04

## 2019-06-04 VITALS
DIASTOLIC BLOOD PRESSURE: 74 MMHG | WEIGHT: 150 LBS | HEART RATE: 74 BPM | OXYGEN SATURATION: 97 % | SYSTOLIC BLOOD PRESSURE: 122 MMHG | BODY MASS INDEX: 24.21 KG/M2 | TEMPERATURE: 97.4 F

## 2019-06-04 VITALS
OXYGEN SATURATION: 99 % | SYSTOLIC BLOOD PRESSURE: 157 MMHG | DIASTOLIC BLOOD PRESSURE: 77 MMHG | WEIGHT: 148 LBS | HEART RATE: 70 BPM | HEIGHT: 67 IN | BODY MASS INDEX: 23.23 KG/M2 | TEMPERATURE: 97.6 F | RESPIRATION RATE: 16 BRPM

## 2019-06-04 DIAGNOSIS — R42 VERTIGO: ICD-10-CM

## 2019-06-04 DIAGNOSIS — R51.9 ACUTE NONINTRACTABLE HEADACHE, UNSPECIFIED HEADACHE TYPE: Primary | ICD-10-CM

## 2019-06-04 LAB
ANION GAP SERPL CALCULATED.3IONS-SCNC: 9 MMOL/L (ref 3–14)
BASOPHILS # BLD AUTO: 0 10E9/L (ref 0–0.2)
BASOPHILS NFR BLD AUTO: 0.5 %
BUN SERPL-MCNC: 12 MG/DL (ref 7–30)
CALCIUM SERPL-MCNC: 9.7 MG/DL (ref 8.5–10.1)
CHLORIDE SERPL-SCNC: 105 MMOL/L (ref 94–109)
CO2 SERPL-SCNC: 28 MMOL/L (ref 20–32)
CREAT SERPL-MCNC: 0.55 MG/DL (ref 0.52–1.04)
DIFFERENTIAL METHOD BLD: NORMAL
EOSINOPHIL # BLD AUTO: 0 10E9/L (ref 0–0.7)
EOSINOPHIL NFR BLD AUTO: 0.2 %
ERYTHROCYTE [DISTWIDTH] IN BLOOD BY AUTOMATED COUNT: 14.2 % (ref 10–15)
GFR SERPL CREATININE-BSD FRML MDRD: >90 ML/MIN/{1.73_M2}
GLUCOSE SERPL-MCNC: 104 MG/DL (ref 70–99)
HCT VFR BLD AUTO: 42.5 % (ref 35–47)
HGB BLD-MCNC: 14.1 G/DL (ref 11.7–15.7)
IMM GRANULOCYTES # BLD: 0 10E9/L (ref 0–0.4)
IMM GRANULOCYTES NFR BLD: 0 %
LYMPHOCYTES # BLD AUTO: 1.2 10E9/L (ref 0.8–5.3)
LYMPHOCYTES NFR BLD AUTO: 20.7 %
MCH RBC QN AUTO: 30.3 PG (ref 26.5–33)
MCHC RBC AUTO-ENTMCNC: 33.2 G/DL (ref 31.5–36.5)
MCV RBC AUTO: 91 FL (ref 78–100)
MONOCYTES # BLD AUTO: 0.3 10E9/L (ref 0–1.3)
MONOCYTES NFR BLD AUTO: 5.8 %
NEUTROPHILS # BLD AUTO: 4.3 10E9/L (ref 1.6–8.3)
NEUTROPHILS NFR BLD AUTO: 72.8 %
NRBC # BLD AUTO: 0 10*3/UL
NRBC BLD AUTO-RTO: 0 /100
PLATELET # BLD AUTO: 300 10E9/L (ref 150–450)
POTASSIUM SERPL-SCNC: 3.7 MMOL/L (ref 3.4–5.3)
RBC # BLD AUTO: 4.65 10E12/L (ref 3.8–5.2)
SODIUM SERPL-SCNC: 142 MMOL/L (ref 133–144)
WBC # BLD AUTO: 5.9 10E9/L (ref 4–11)

## 2019-06-04 PROCEDURE — 96361 HYDRATE IV INFUSION ADD-ON: CPT

## 2019-06-04 PROCEDURE — 85025 COMPLETE CBC W/AUTO DIFF WBC: CPT | Performed by: EMERGENCY MEDICINE

## 2019-06-04 PROCEDURE — 25000132 ZZH RX MED GY IP 250 OP 250 PS 637: Performed by: EMERGENCY MEDICINE

## 2019-06-04 PROCEDURE — 80048 BASIC METABOLIC PNL TOTAL CA: CPT | Performed by: EMERGENCY MEDICINE

## 2019-06-04 PROCEDURE — 99284 EMERGENCY DEPT VISIT MOD MDM: CPT | Mod: 25

## 2019-06-04 PROCEDURE — 25000128 H RX IP 250 OP 636: Performed by: EMERGENCY MEDICINE

## 2019-06-04 PROCEDURE — 96374 THER/PROPH/DIAG INJ IV PUSH: CPT

## 2019-06-04 PROCEDURE — 99214 OFFICE O/P EST MOD 30 MIN: CPT | Performed by: PHYSICIAN ASSISTANT

## 2019-06-04 RX ORDER — MECLIZINE HYDROCHLORIDE 25 MG/1
25 TABLET ORAL ONCE
Status: COMPLETED | OUTPATIENT
Start: 2019-06-04 | End: 2019-06-04

## 2019-06-04 RX ORDER — ONDANSETRON 2 MG/ML
4 INJECTION INTRAMUSCULAR; INTRAVENOUS ONCE
Status: COMPLETED | OUTPATIENT
Start: 2019-06-04 | End: 2019-06-04

## 2019-06-04 RX ORDER — ONDANSETRON 4 MG/1
4 TABLET, ORALLY DISINTEGRATING ORAL EVERY 8 HOURS PRN
Qty: 10 TABLET | Refills: 0 | Status: SHIPPED | OUTPATIENT
Start: 2019-06-04 | End: 2019-09-26

## 2019-06-04 RX ORDER — MECLIZINE HYDROCHLORIDE 25 MG/1
25-50 TABLET ORAL EVERY 6 HOURS PRN
Qty: 24 TABLET | Refills: 0 | Status: SHIPPED | OUTPATIENT
Start: 2019-06-04 | End: 2019-08-08

## 2019-06-04 RX ADMIN — MECLIZINE HYDROCHLORIDE 25 MG: 25 TABLET ORAL at 15:29

## 2019-06-04 RX ADMIN — SODIUM CHLORIDE 1000 ML: 9 INJECTION, SOLUTION INTRAVENOUS at 15:28

## 2019-06-04 RX ADMIN — ONDANSETRON 4 MG: 2 INJECTION INTRAMUSCULAR; INTRAVENOUS at 15:29

## 2019-06-04 ASSESSMENT — ENCOUNTER SYMPTOMS
WEAKNESS: 0
RHINORRHEA: 0
PALPITATIONS: 0
FEVER: 0
SORE THROAT: 0
NAUSEA: 1
COUGH: 0
DIZZINESS: 1
NUMBNESS: 0
VOMITING: 0
SHORTNESS OF BREATH: 0

## 2019-06-04 ASSESSMENT — MIFFLIN-ST. JEOR: SCORE: 1228.95

## 2019-06-04 NOTE — ED AVS SNAPSHOT
Emergency Department  [address] [place] [id number]  Phone:  [phone]  Fax:  [phone]                                    Angeli Jacobson   MRN: [id number]    Department:   Emergency Department   Date of Visit:  6/4/2019           After Visit Summary Signature Page    I have received my discharge instructions, and my questions have been answered. I have discussed any challenges I see with this plan with the nurse or doctor.    ..........................................................................................................................................  Patient/Patient Representative Signature      ..........................................................................................................................................  Patient Representative Print Name and Relationship to Patient    ..................................................               ................................................  Date                                   Time    ..........................................................................................................................................  Reviewed by Signature/Title    ...................................................              ..............................................  Date                                               Time          22EPIC Rev 08/18

## 2019-06-04 NOTE — TELEPHONE ENCOUNTER
"Pt calling into clinic  Started at Lymphedema clinic yesterday for left leg lymphedema  Concerned she has an infection  C/o HA, dizziness throughout the night, took oxycodone and Tylenol, had a small bowl of cereal with meds at 4 am, now nauseated, \"dry heaves\"   no changes in leg, temp 98 (states she normally runs 96)  Pt spoke to OT from lymphedema who recommended seeing PCP    No known openings, unsure what to recommend    # [phone] (home) OK to LM    Route to provider to review and advise    Azul James RN Nurse Triage          "

## 2019-06-04 NOTE — ED TRIAGE NOTES
"Patient in with complaints of dizziness and nausea. States being treated for lymphedema and concerned about an infection. States dizziness started Friday with dry heaves. States cleared up and returned last night. \"My head feels full.\" Was seen at Urgent care prior to arriving in ED today.  "

## 2019-06-04 NOTE — PROGRESS NOTES
SUBJECTIVE:  Angeli Jacobson is a 69 year old female who comes in for evaluation of headache and dizziness.  Headache began 1day(s)}ago and is improving  DESCRIPTION OF HEADACHE:   Location of pain: left-sided unilateral and occipital   Radiation of pain?: NO   Character of pain:sharp   Severity of pain: moderate to severe, not improved with codeine    Accompanying symptoms: nausea, vomiting and vertigo   Prodromal sx?: NONE   Rapidity of onset: abrupt     No cv disease    Fractured hip in april    Popliteal bypass this year    [place] returned 5 days ago.       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    CHRONIC LEFT THIGH LYMPHEDEMA     Nontoxic multinodular goiter 2005    needs yearly US     Osteoporosis, unspecified 2001     Other lymphedema 2000    left thigh, gets regular PT for this     PAD (peripheral artery disease) (H) 4/20/2018     PONV (postoperative nausea and vomiting)      SHINGLES 2001     Sprain of lumbosacral (joint) (ligament) 1995    right     Unspecified hearing loss 1998    chronic tinnitus     Unspecified tinnitus 1998     Current Outpatient Medications   Medication Sig Dispense Refill     Acetaminophen 325 MG CAPS Take 500 mg by mouth 2 times daily as needed        alendronate (FOSAMAX) 70 MG tablet Take 1 tablet (70 mg) by mouth with 8oz water every 7 days 30 minutes before breakfast and remain upright during this time. (Patient taking differently: Take 70 mg by mouth every  7 days Take 1 tablet (70 mg) by mouth with 8oz water every 7 days 30 minutes before breakfast and remain upright during this time.) 12 tablet 3     aspirin (ASA) 81 MG chewable tablet Take 81 mg by mouth daily       CALCIUM PO Take 1 tablet by mouth daily       clopidogrel (PLAVIX) 75 MG tablet Take 1 tablet (75 mg) by mouth daily 90 tablet 3     ezetimibe (ZETIA) 10 MG tablet Take 1 tablet (10 mg) by mouth daily 90 tablet 3     fluticasone (FLONASE) 50 MCG/ACT spray Spray 1-2 sprays into both nostrils daily (Patient taking differently: Spray 1-2 sprays into both nostrils daily as needed ) 1 Bottle 11     multivitamin, therapeutic (THERA-VIT) TABS tablet Take 1 tablet by mouth daily       order for DME Equipment being ordered: Walker Wheels () and Walker ()  Treatment Diagnosis: difficulty walking 1 each 0     order for DME Equipment being ordered: Left Thigh High Compression Stocking, 550 CCL.3 1 each 99     ORDER FOR DME Equipment being ordered: lymphedema bandages 2 Device 1     oxybutynin ER (DITROPAN-XL) 5 MG 24 hr tablet Take 1 tablet (5 mg) by mouth daily 90 tablet 1     oxyCODONE (ROXICODONE) 5 MG tablet Take 1 tablet (5 mg) by mouth every 4 hours as needed for moderate to severe pain 20 tablet 0     rosuvastatin (CRESTOR) 40 MG tablet Take 1 tablet (40 mg) by mouth every evening 90 tablet 3     solifenacin (VESICARE) 5 MG tablet Take 1 tablet (5 mg) by mouth daily 30 tablet 1     venlafaxine (EFFEXOR-XR) 37.5 MG 24 hr capsule Take 1 capsule (37.5 mg) by mouth daily (with breakfast) 7 capsule 0     meclizine (ANTIVERT) 25 MG tablet Take 1-2 tablets (25-50 mg) by mouth every 6 hours as needed for dizziness 24 tablet 0     ondansetron (ZOFRAN ODT) 4 MG ODT tab Take 1 tablet (4 mg) by mouth every 8 hours as needed for nausea 10 tablet 0     Social History     Tobacco Use     Smoking status: Never Smoker     Smokeless tobacco: Never Used   Substance Use Topics     Alcohol use: No       ROS:  10 point  ROS negative except as listed above        OBJECTIVE:  /74   Pulse 74   Temp 97.4  F (36.3  C) (Oral)   Wt 68 kg (150 lb)   LMP 03/18/2005   SpO2 97%   BMI 24.21 kg/m    GENERAL APPEARANCE: healthy, alert and no distress  EYES:  conjunctiva clear  HENT: ear canals and TM's normal.  Nose and mouth without ulcers, erythema or lesions  NECK: supple, nontender, no lymphadenopathy  RESP: lungs clear to auscultation - no rales, rhonchi or wheezes  CV: regular rates and rhythm, left leg swelling  ABDOMEN:  soft, nontender, no HSM or masses and bowel sounds normal  NEURO: Normal strength and tone, sensory exam grossly normal,  normal speech and mentation. Unable to assess balance   SKIN: no suspicious lesions or rashes    ASSESSMENT:  (R51) Acute nonintractable headache, unspecified headache type  (primary encounter diagnosis)  (R42) Vertigo  Comment: Concern for central etiology, thrombotic event, ischemia cannot be ruled out   Plan: Sent to ER by private vehicle

## 2019-06-04 NOTE — DISCHARGE INSTRUCTIONS
Symptoms likely due to inner ear problem.  Take Antivert as needed for dizziness and Zofran as needed for nausea and vomiting.  Return immediately should you develop new or concerning symptoms including, but not limited to, vision changes, slurred speech, numbness or weakness, hearing decreased, severe headache, fever, or inability to walk.  Otherwise, you should follow-up with your primary care provider later this week for recheck.  Resume home medications as we discussed.

## 2019-06-04 NOTE — TELEPHONE ENCOUNTER
Angeli informed and agrees to plan. Call forwarded to scheduling.  She agrees to go to  today if no open [facility] [place] [facility] visit.   Jameson Price RN

## 2019-06-04 NOTE — ED NOTES
Pt given 2 Rx to fill. Pt advised to follow-up with PCP later this week or early next. Pt advised to return to ED if worsening symptoms or any new concerns.

## 2019-06-04 NOTE — ED PROVIDER NOTES
History   Chief Complaint:  Dizziness    The history is provided by the patient.      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 She describes this as a room spinning sensation or loss of balance and it is exacerbated by moving her head too fast. She has associated nausea. She has a history of Ménière's disease but has not had issues with it in years. She does not think this episode is similar. She is concerned that her recent therapy for lymphedema may be contributing to her symptoms. She was seen at Urgent Care and sent for further investigation. She initially had a posterior left headache but this resolved with 500 mg of Tylenol and 5 mg of Roxicodone about 11 hours prior to arrival. She feels her speech may be slower than normal but has had no difficult finding words or slurred speech. She denies fever, cough, rhinorrhea, sore throat, hearing changes, vision changes, chest pain, shortness of breath, palpations, numbness, new weakness, or any other acute symptoms. Notably, patient reports recent air travel and notes she often has Eustachian tube dysfunction when flying.     Allergies:  Celexa     Medications:    Fosamax  Aspirin  Plavix  Zeitia  Flonase  Ditropan  Crestor  Vesicare  Effexor  Roxicodone    Past Medical History:    Central serous retinopathy  Chronic neck pain  Mixed hyperlipidemia  Myxoid liposarcoma  Nontoxic multinodular goiter  Osteoporosis  Lymphedema  PAD  Shingles    Past Surgical History:    Femoropopliteal bypass graft  Appendectomy  Myxoid Liposarcoma excision  Hip surgery  Left SFA stent in bypass graft    Family History:    CAD  Obesity  Osteoporosis  Colon cancer  Hyperlipidemia  Alcohol abuse    Social History:  Smoking status: Never  Alcohol use: No  Marital Status:     Presents with  "    Review of Systems   Constitutional: Negative for fever.   HENT: Negative for hearing loss, rhinorrhea and sore throat.    Eyes: Negative for visual disturbance.   Respiratory: Negative for cough and shortness of breath.    Cardiovascular: Negative for chest pain and palpitations.   Gastrointestinal: Positive for nausea. Negative for vomiting.   Neurological: Positive for dizziness and headaches (resolved). Negative for speech difficulty, weakness and numbness.   All other systems reviewed and are negative.    Physical Exam     Patient Vitals for the past 24 hrs:   BP Temp Temp src Pulse Resp SpO2 Height Weight   06/04/19 1425 157/77 97.6  F (36.4  C) Oral 70 16 99 % 1.702 m (5' 7\") 67.1 kg (148 lb)     Physical Exam  General: Well-developed and well-nourished. Well appearing elderly  woman. Cooperative.  Head:  Atraumatic.  Eyes:  Conjunctivae, lids, and sclerae are normal.  ENT:    Normal nose. Moist mucous membranes.  TMs clear bilaterally.  Neck:  Supple. Normal range of motion.  CV:  Regular rate and rhythm. Normal heart sounds with no murmurs, rubs, or gallops detected.  Resp:  No respiratory distress. Clear to auscultation bilaterally without decreased breath sounds, wheezing, rales, or rhonchi.  GI:  Soft. Non-distended. Non-tender.    MS:  Normal ROM. Chronic lymphedema of the left lower extremity edema.  Skin:  Warm. Non-diaphoretic. No pallor.  No significant erythema or open wounds of the chronically edematous left lower extremity.  Neuro: Awake. A&Ox3.     Strength 5/5 bilateral upper and right lower extremity.  Chronic weakness of the left lower extremity.    No pronator drift.    Sensation intact to light touch with the exception of the left lower leg which is chronic.    No facial droop. No dysarthria.    No aphasia.    PERRL.     No visual field deficits.    No dysmetria.    No dysdiadochokinesis.  Psych:  Normal mood and affect. Normal speech.  Vitals reviewed.    Emergency " Department Course   Laboratory:  CBC: WNL (WBC 5.9, HGB 14.1, )    BMP: Glucose 104 (H), o/w WNL (Creatinine 0.55)    Interventions:  1528: NS 1L IV Bolus   1529: Meclizine 25 mg PO  1529: Zofran 4 mg IV    Emergency Department Course:  Past medical records, nursing notes, and vitals reviewed.  1509: I performed an exam of the patient and obtained history, as documented above.  IV inserted and blood drawn.    1656: I rechecked the patient. Explained findings to patient.    Findings and plan explained to the patient. Patient discharged home with instructions regarding supportive care, medications, and reasons to return. The importance of close follow-up was reviewed.     Impression & Plan    Medical Decision Making:  Angeli is a 69-year-old female who does report remote history of Ménière's disease as well as recent air travel, who presents with acute onset vertigo last night.  She has associated nausea without vomiting but denies all other concerns or complaints including all neurologic symptoms.  Notably, she has chronic weakness and numbness of the left lower extremity with associated lymphedema after a sarcoma removal in this extremity.  Patient's exam is unremarkable with no focal neurologic deficits aside from chronic weakness to the left lower extremity and chronic decreased sensation to light touch of the left lower leg which she reports is unchanged from her baseline.  My suspicion for an acute intracranial process is quite low without any of these neurologic findings on exam, particularly given her history of Ménière's disease and recent travel by plane adding Eustachian tube dysfunction to the differential.  At this time there is no indication for MRI.  I did obtain basic laboratory studies which, fortunately, are unremarkable without cause for her symptoms.  Patient was treated with IV fluids as well as meclizine and Zofran on repeat evaluation states she has had improvement of her vertigo and  no longer feels the room spinning.  She has had no further nausea and no vomiting in the emergency department.  As such, I think she is appropriate for discharge.  Likely vertigo is peripheral etiology related to inner ear and I have recommended she continue Antivert and Zofran as needed as should symptoms recur.  However, she understands a very low threshold for return should she develop new or concerning symptoms including, but not limited to, fever or neurologic symptoms.  Patient also verbalizes understanding of need to follow-up with her primary care provider later this week for recheck.  All of her and her 's questions were answered and they verbalized understanding.  Amenable to discharge.    Diagnosis:    ICD-10-CM    1. Vertigo R42      Disposition:  Discharged home.    Discharge Medications:     Medication List      Started    meclizine 25 MG tablet  Commonly known as:  ANTIVERT  25-50 mg, Oral, EVERY 6 HOURS PRN     ondansetron 4 MG ODT tab  Commonly known as:  ZOFRAN ODT  4 mg, Oral, EVERY 8 HOURS PRN          Ha Araiza  6/4/2019    EMERGENCY DEPARTMENT  Scribe Disclosure:  I, Ha Araiza, am serving as a scribe at 3:09 PM on 6/4/2019 to document services personally performed by Penelope Greer MD based on my observations and the provider's statements to me.         Penelope Greer MD  06/06/19 3133

## 2019-06-04 NOTE — TELEPHONE ENCOUNTER
Patient needs to be seen within the next 24 hours. Please offer our 'same day provider', another  primary clinic or UC if I do not have any openings.    Sepideh Burris PA-C

## 2019-06-05 ENCOUNTER — HOSPITAL ENCOUNTER (OUTPATIENT)
Dept: OCCUPATIONAL THERAPY | Facility: CLINIC | Age: 69
Setting detail: THERAPIES SERIES
End: 2019-06-05
Attending: ORTHOPAEDIC SURGERY
Payer: COMMERCIAL

## 2019-06-05 ENCOUNTER — THERAPY VISIT (OUTPATIENT)
Dept: PHYSICAL THERAPY | Facility: CLINIC | Age: 69
End: 2019-06-05
Payer: COMMERCIAL

## 2019-06-05 DIAGNOSIS — Z98.890 S/P ORIF (OPEN REDUCTION INTERNAL FIXATION) FRACTURE: ICD-10-CM

## 2019-06-05 DIAGNOSIS — M25.552 HIP PAIN, LEFT: ICD-10-CM

## 2019-06-05 DIAGNOSIS — Z87.81 S/P ORIF (OPEN REDUCTION INTERNAL FIXATION) FRACTURE: ICD-10-CM

## 2019-06-05 PROCEDURE — 97140 MANUAL THERAPY 1/> REGIONS: CPT | Mod: GO | Performed by: OCCUPATIONAL THERAPIST

## 2019-06-05 PROCEDURE — 97110 THERAPEUTIC EXERCISES: CPT | Mod: GP | Performed by: PHYSICAL THERAPIST

## 2019-06-05 PROCEDURE — 97530 THERAPEUTIC ACTIVITIES: CPT | Mod: GP | Performed by: PHYSICAL THERAPIST

## 2019-06-06 ENCOUNTER — HOSPITAL ENCOUNTER (OUTPATIENT)
Dept: OCCUPATIONAL THERAPY | Facility: CLINIC | Age: 69
Setting detail: THERAPIES SERIES
End: 2019-06-06
Attending: ORTHOPAEDIC SURGERY
Payer: COMMERCIAL

## 2019-06-06 PROCEDURE — 97140 MANUAL THERAPY 1/> REGIONS: CPT | Mod: GO | Performed by: OCCUPATIONAL THERAPIST

## 2019-06-06 ASSESSMENT — ENCOUNTER SYMPTOMS
SPEECH DIFFICULTY: 0
HEADACHES: 1

## 2019-06-07 ENCOUNTER — HOSPITAL ENCOUNTER (OUTPATIENT)
Dept: OCCUPATIONAL THERAPY | Facility: CLINIC | Age: 69
Setting detail: THERAPIES SERIES
End: 2019-06-07
Attending: ORTHOPAEDIC SURGERY
Payer: COMMERCIAL

## 2019-06-07 PROCEDURE — 97140 MANUAL THERAPY 1/> REGIONS: CPT | Mod: GO | Performed by: OCCUPATIONAL THERAPIST

## 2019-06-10 ENCOUNTER — HOSPITAL ENCOUNTER (OUTPATIENT)
Dept: OCCUPATIONAL THERAPY | Facility: CLINIC | Age: 69
Setting detail: THERAPIES SERIES
End: 2019-06-10
Attending: ORTHOPAEDIC SURGERY
Payer: COMMERCIAL

## 2019-06-10 PROCEDURE — 97140 MANUAL THERAPY 1/> REGIONS: CPT | Mod: GO | Performed by: OCCUPATIONAL THERAPIST

## 2019-06-11 ENCOUNTER — HOSPITAL ENCOUNTER (OUTPATIENT)
Dept: OCCUPATIONAL THERAPY | Facility: CLINIC | Age: 69
Setting detail: THERAPIES SERIES
End: 2019-06-11
Attending: ORTHOPAEDIC SURGERY
Payer: COMMERCIAL

## 2019-06-11 PROCEDURE — 97140 MANUAL THERAPY 1/> REGIONS: CPT | Mod: GO | Performed by: OCCUPATIONAL THERAPIST

## 2019-06-12 ENCOUNTER — OFFICE VISIT (OUTPATIENT)
Dept: URGENT CARE | Facility: URGENT CARE | Age: 69
End: 2019-06-12
Payer: COMMERCIAL

## 2019-06-12 ENCOUNTER — HOSPITAL ENCOUNTER (OUTPATIENT)
Dept: OCCUPATIONAL THERAPY | Facility: CLINIC | Age: 69
Setting detail: THERAPIES SERIES
End: 2019-06-12
Attending: ORTHOPAEDIC SURGERY
Payer: COMMERCIAL

## 2019-06-12 VITALS
HEART RATE: 87 BPM | SYSTOLIC BLOOD PRESSURE: 130 MMHG | DIASTOLIC BLOOD PRESSURE: 80 MMHG | OXYGEN SATURATION: 96 % | TEMPERATURE: 98.1 F

## 2019-06-12 DIAGNOSIS — R30.0 DYSURIA: ICD-10-CM

## 2019-06-12 DIAGNOSIS — N30.00 ACUTE CYSTITIS WITHOUT HEMATURIA: Primary | ICD-10-CM

## 2019-06-12 DIAGNOSIS — R82.90 ABNORMAL URINE FINDINGS: ICD-10-CM

## 2019-06-12 LAB
ALBUMIN UR-MCNC: 100 MG/DL
APPEARANCE UR: ABNORMAL
BACTERIA #/AREA URNS HPF: ABNORMAL /HPF
BILIRUB UR QL STRIP: NEGATIVE
COLOR UR AUTO: YELLOW
GLUCOSE UR STRIP-MCNC: NEGATIVE MG/DL
HGB UR QL STRIP: ABNORMAL
KETONES UR STRIP-MCNC: NEGATIVE MG/DL
LEUKOCYTE ESTERASE UR QL STRIP: ABNORMAL
NITRATE UR QL: NEGATIVE
PH UR STRIP: 5.5 PH (ref 5–7)
RBC #/AREA URNS AUTO: ABNORMAL /HPF
SOURCE: ABNORMAL
SP GR UR STRIP: 1.02 (ref 1–1.03)
UROBILINOGEN UR STRIP-ACNC: 0.2 EU/DL (ref 0.2–1)
WBC #/AREA URNS AUTO: >100 /HPF

## 2019-06-12 PROCEDURE — 97140 MANUAL THERAPY 1/> REGIONS: CPT | Mod: GO | Performed by: OCCUPATIONAL THERAPIST

## 2019-06-12 PROCEDURE — 81001 URINALYSIS AUTO W/SCOPE: CPT | Performed by: PHYSICIAN ASSISTANT

## 2019-06-12 PROCEDURE — 87186 SC STD MICRODIL/AGAR DIL: CPT | Performed by: PHYSICIAN ASSISTANT

## 2019-06-12 PROCEDURE — 87086 URINE CULTURE/COLONY COUNT: CPT | Performed by: PHYSICIAN ASSISTANT

## 2019-06-12 PROCEDURE — 99213 OFFICE O/P EST LOW 20 MIN: CPT | Performed by: PHYSICIAN ASSISTANT

## 2019-06-12 PROCEDURE — 87088 URINE BACTERIA CULTURE: CPT | Performed by: PHYSICIAN ASSISTANT

## 2019-06-12 RX ORDER — CEFDINIR 300 MG/1
300 CAPSULE ORAL 2 TIMES DAILY
Qty: 14 CAPSULE | Refills: 0 | Status: SHIPPED | OUTPATIENT
Start: 2019-06-12 | End: 2019-06-14

## 2019-06-12 ASSESSMENT — ENCOUNTER SYMPTOMS
FREQUENCY: 1
FLANK PAIN: 0
NAUSEA: 0
CHILLS: 0
ABDOMINAL PAIN: 0
FEVER: 0
DIARRHEA: 0
VOMITING: 0
DYSURIA: 1
HEMATURIA: 0

## 2019-06-12 NOTE — PROGRESS NOTES
SUBJECTIVE:   Angeli Jacobson is a 69 year old female presenting with a chief complaint of   Chief Complaint   Patient presents with     Urgent Care     Urinary Problem     Possible UTI x4 days- frequency, pressure, urgency       She is an established patient of [facility].    UTI    Onset of symptoms was 4 day(s) ago.  Course of illness is worsening  Severity moderate  Current and associated symptoms dysuria, frequency, urgency and suprapubic pain and pressure  Treatment and measures tried None  Predisposing factors include none  Patient denies rigors, flank pain, temperature > 101 degrees F., vomiting, vaginal discharge    , hematuria.        Review of Systems   Constitutional: Negative for chills and fever.   Gastrointestinal: Negative for abdominal pain, diarrhea, nausea and vomiting.   Genitourinary: Positive for dysuria, frequency and urgency. Negative for flank pain, hematuria and vaginal discharge.       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, S/P excision, radiation  at [facility]     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    CHRONIC LEFT THIGH LYMPHEDEMA     Nontoxic multinodular goiter 2005    needs yearly US     Osteoporosis, unspecified 2001     Other lymphedema 2000    left thigh, gets regular PT for this     PAD (peripheral artery disease) (H) 4/20/2018     PONV (postoperative nausea and vomiting)      SHINGLES 2001     Sprain of lumbosacral (joint) (ligament) 1995    right     Unspecified hearing loss 1998    chronic tinnitus     Unspecified tinnitus 1998     Family History    Problem Relation Age of Onset     OXANA Father         MI 57     Alcohol/Drug Father         etoh     Obesity Mother      Osteoporosis Mother      Colon Cancer Brother 70     Hyperlipidemia Son      Hyperlipidemia Son         very high, experimental drug     OXANA Paternal Grandmother         ascvd     Diabetes Maternal Grandmother      Cancer Maternal Grandmother      OXANA Paternal Uncle         Mi  age 48     Cancer Maternal Aunt         pancreatic CA     Current Outpatient Medications   Medication Sig Dispense Refill     Acetaminophen 325 MG CAPS Take 500 mg by mouth 2 times daily as needed        alendronate (FOSAMAX) 70 MG tablet Take 1 tablet (70 mg) by mouth with 8oz water every 7 days 30 minutes before breakfast and remain upright during this time. (Patient taking differently: Take 70 mg by mouth every 7 days Take 1 tablet (70 mg) by mouth with 8oz water every 7 days 30 minutes before breakfast and remain upright during this time.) 12 tablet 3     aspirin (ASA) 81 MG chewable tablet Take 81 mg by mouth daily       CALCIUM PO Take 1 tablet by mouth daily       cefdinir (OMNICEF) 300 MG capsule Take 1 capsule (300 mg) by mouth 2 times daily for 7 days 14 capsule 0     clopidogrel (PLAVIX) 75 MG tablet Take 1 tablet (75 mg) by mouth daily 90 tablet 3     ezetimibe (ZETIA) 10 MG tablet Take 1 tablet (10 mg) by mouth daily 90 tablet 3     fluticasone (FLONASE) 50 MCG/ACT spray Spray 1-2 sprays into both nostrils daily (Patient taking differently: Spray 1-2 sprays into both nostrils daily as needed ) 1 Bottle 11     meclizine (ANTIVERT) 25 MG tablet Take 1-2 tablets (25-50 mg) by mouth every 6 hours as needed for dizziness 24 tablet 0     multivitamin, therapeutic (THERA-VIT) TABS tablet Take 1 tablet by mouth daily       ondansetron (ZOFRAN ODT) 4 MG ODT tab Take 1 tablet (4 mg) by mouth every 8 hours as needed for nausea 10 tablet 0     oxybutynin ER (DITROPAN-XL) 5 MG 24 hr tablet Take 1 tablet (5  mg) by mouth daily 90 tablet 1     oxyCODONE (ROXICODONE) 5 MG tablet Take 1 tablet (5 mg) by mouth every 4 hours as needed for moderate to severe pain 20 tablet 0     rosuvastatin (CRESTOR) 40 MG tablet Take 1 tablet (40 mg) by mouth every evening 90 tablet 3     venlafaxine (EFFEXOR-XR) 37.5 MG 24 hr capsule Take 1 capsule (37.5 mg) by mouth daily (with breakfast) 7 capsule 0     order for DME Equipment being ordered: Walker Wheels () and Walker ()  Treatment Diagnosis: difficulty walking 1 each 0     order for DME Equipment being ordered: Left Thigh High Compression Stocking, 550 CCL.3 1 each 99     ORDER FOR DME Equipment being ordered: lymphedema bandages 2 Device 1     solifenacin (VESICARE) 5 MG tablet Take 1 tablet (5 mg) by mouth daily (Patient not taking: Reported on 6/12/2019) 30 tablet 1     Social History     Tobacco Use     Smoking status: Never Smoker     Smokeless tobacco: Never Used   Substance Use Topics     Alcohol use: No       OBJECTIVE  /80 (BP Location: Right arm, Patient Position: Chair, Cuff Size: Adult Regular)   Pulse 87   Temp 98.1  F (36.7  C) (Oral)   LMP 03/18/2005   SpO2 96%     Physical Exam   Constitutional: She appears well-developed and well-nourished. No distress.   HENT:   Head: Normocephalic and atraumatic.   Eyes: Conjunctivae are normal.   Neck: Normal range of motion.   Cardiovascular: Regular rhythm and normal heart sounds.   Pulmonary/Chest: Effort normal and breath sounds normal. No respiratory distress.   Abdominal: Soft. Bowel sounds are normal. She exhibits no distension. There is no tenderness.   Neurological: She is alert.   Skin: Skin is warm and dry.   Psychiatric: She has a normal mood and affect.       Labs:  Results for orders placed or performed in visit on 06/12/19 (from the past 24 hour(s))   UA reflex to Microscopic and Culture   Result Value Ref Range    Color Urine Yellow     Appearance Urine Cloudy     Glucose Urine Negative  "NEG^Negative mg/dL    Bilirubin Urine Negative NEG^Negative    Ketones Urine Negative NEG^Negative mg/dL    Specific Gravity Urine 1.025 1.003 - 1.035    Blood Urine Moderate (A) NEG^Negative    pH Urine 5.5 5.0 - 7.0 pH    Protein Albumin Urine 100 (A) NEG^Negative mg/dL    Urobilinogen Urine 0.2 0.2 - 1.0 EU/dL    Nitrite Urine Negative NEG^Negative    Leukocyte Esterase Urine Large (A) NEG^Negative    Source Midstream Urine    Urine Microscopic   Result Value Ref Range    WBC Urine >100 (A) OTO5^0 - 5 /HPF    RBC Urine 5-10 (A) OTO2^O - 2 /HPF    Bacteria Urine Few (A) NEG^Negative /HPF         ASSESSMENT:      ICD-10-CM    1. Acute cystitis without hematuria N30.00 cefdinir (OMNICEF) 300 MG capsule   2. Dysuria R30.0 UA reflex to Microscopic and Culture     Urine Microscopic   3. Abnormal urine findings R82.90 Urine Culture Aerobic Bacterial          PLAN:    UTI Adult: UA suggestive of infection today. Omnicef Rx. Urine culture is pending. Push fluids. We will communicate any changes to the antibiotic if need be based on the urine culture result. Follow up if any worsening symptoms. Patient agrees with the plan.       Followup:    If not improving or if condition worsens, follow up with your Primary Care Provider    Patient Instructions     Patient Education     Bladder Infection, Female (Adult)    Urine is normally doesn't have any bacteria in it. But bacteria can get into the urinary tract from the skin around the rectum. Or they can travel in the blood from elsewhere in the body. Once they are in your urinary tract, they can cause infection in the urethra (urethritis), the bladder (cystitis), or the kidneys (pyelonephritis).  The most common place for an infection is in the bladder. This is called a bladder infection. This is one of the most common infections in women. Most bladder infections are easily treated. They are not serious unless the infection spreads to the kidney.  The phrases \"bladder " "infection,\" \"UTI,\" and \"cystitis\" are often used to describe the same thing. But they are not always the same. Cystitis is an inflammation of the bladder. The most common cause of cystitis is an infection.  Symptoms  The infection causes inflammation in the urethra and bladder. This causes many of the symptoms. The most common symptoms of a bladder infection are:    Pain or burning when urinating    Having to urinate more often than usual    Urgent need to urinate    Only a small amount of urine comes out    Blood in urine    Abdominal discomfort. This is usually in the lower abdomen above the pubic bone.    Cloudy urine    Strong- or bad-smelling urine    Unable to urinate (urinary retention)    Unable to hold urine in (urinary incontinence)    Fever    Loss of appetite    Confusion (in older adults)  Causes  Bladder infections are not contagious. You can't get one from someone else, from a toilet seat, or from sharing a bath.  The most common cause of bladder infections is bacteria from the bowels. The bacteria get onto the skin around the opening of the urethra. From there, they can get into the urine and travel up to the bladder, causing inflammation and infection. This usually happens because of:    Wiping improperly after urinating. Always wipe from front to back.    Bowel incontinence    Pregnancy    Procedures such as having a catheter inserted    Older age    Not emptying your bladder. This can allow bacteria a chance to grow in your urine.    Dehydration    Constipation    Sex    Use of a diaphragm for birth control   Treatment  Bladder infections are diagnosed by a urine test. They are treated with antibiotics and usually clear up quickly without complications. Treatment helps prevent a more serious kidney infection.  Medicines  Medicines can help in the treatment of a bladder infection:    Take antibiotics until they are used up, even if you feel better. It is important to finish them to make sure the " infection has cleared.    You can use acetaminophen or ibuprofen for pain, fever, or discomfort, unless another medicine was prescribed. If you have chronic liver or kidney disease, talk with your healthcare provider before using these medicines. Also talk with your provider if you've ever had a stomach ulcer or gastrointestinal bleeding, or are taking blood-thinner medicines.    If you are given phenazopydridine to reduce burning with urination, it will cause your urine to become a bright orange color. This can stain clothing.  Care and prevention  These self-care steps can help prevent future infections:    Drink plenty of fluids to prevent dehydration and flush out your bladder. Do this unless you must restrict fluids for other health reasons, or your doctor told you not to.    Proper cleaning after going to the bathroom is important. Wipe from front to back after using the toilet to prevent the spread of bacteria.    Urinate more often. Don't try to hold urine in for a long time.    Wear loose-fitting clothes and cotton underwear. Avoid tight-fitting pants.    Improve your diet and prevent constipation. Eat more fresh fruit and vegetables, and fiber, and less junk and fatty foods.    Avoid sex until your symptoms are gone.    Avoid caffeine, alcohol, and spicy foods. These can irritate your bladder.    Urinate right after intercourse to flush out your bladder.    If you use birth control pills and have frequent bladder infections, discuss it with your doctor.  Follow-up care  Call your healthcare provider if all symptoms are not gone after 3 days of treatment. This is especially important if you have repeat infections.  If a culture was done, you will be told if your treatment needs to be changed. If directed, you can call to find out the results.  If X-rays were done, you will be told if the results will affect your treatment.  Call 911  Call 911 if any of the following occur:    Trouble breathing    Hard to  wake up or confusion    Fainting or loss of consciousness    Rapid heart rate  When to seek medical advice  Call your healthcare provider right away if any of these occur:    Fever of 100.4 F (38.0 C) or higher, or as directed by your healthcare provider    Symptoms are not better by the third day of treatment    Back or belly (abdominal) pain that gets worse    Repeated vomiting, or unable to keep medicine down    Weakness or dizziness    Vaginal discharge    Pain, redness, or swelling in the outer vaginal area (labia)  Date Last Reviewed: 10/1/2016    4067-8622 [employer]. [address] [place] [address]. All rights reserved. This information is not intended as a substitute for professional medical care. Always follow your healthcare professional's instructions.

## 2019-06-13 ENCOUNTER — MYC MEDICAL ADVICE (OUTPATIENT)
Dept: FAMILY MEDICINE | Facility: CLINIC | Age: 69
End: 2019-06-13

## 2019-06-13 ENCOUNTER — HOSPITAL ENCOUNTER (OUTPATIENT)
Dept: OCCUPATIONAL THERAPY | Facility: CLINIC | Age: 69
Setting detail: THERAPIES SERIES
End: 2019-06-13
Attending: ORTHOPAEDIC SURGERY
Payer: COMMERCIAL

## 2019-06-13 DIAGNOSIS — N30.00 ACUTE CYSTITIS WITHOUT HEMATURIA: Primary | ICD-10-CM

## 2019-06-13 PROCEDURE — 97140 MANUAL THERAPY 1/> REGIONS: CPT | Mod: GO | Performed by: OCCUPATIONAL THERAPIST

## 2019-06-13 RX ORDER — NITROFURANTOIN 25; 75 MG/1; MG/1
100 CAPSULE ORAL 2 TIMES DAILY
Qty: 14 CAPSULE | Refills: 0 | Status: SHIPPED | OUTPATIENT
Start: 2019-06-13 | End: 2019-08-08

## 2019-06-13 NOTE — PATIENT INSTRUCTIONS
"  Patient Education     Bladder Infection, Female (Adult)    Urine is normally doesn't have any bacteria in it. But bacteria can get into the urinary tract from the skin around the rectum. Or they can travel in the blood from elsewhere in the body. Once they are in your urinary tract, they can cause infection in the urethra (urethritis), the bladder (cystitis), or the kidneys (pyelonephritis).  The most common place for an infection is in the bladder. This is called a bladder infection. This is one of the most common infections in women. Most bladder infections are easily treated. They are not serious unless the infection spreads to the kidney.  The phrases \"bladder infection,\" \"UTI,\" and \"cystitis\" are often used to describe the same thing. But they are not always the same. Cystitis is an inflammation of the bladder. The most common cause of cystitis is an infection.  Symptoms  The infection causes inflammation in the urethra and bladder. This causes many of the symptoms. The most common symptoms of a bladder infection are:    Pain or burning when urinating    Having to urinate more often than usual    Urgent need to urinate    Only a small amount of urine comes out    Blood in urine    Abdominal discomfort. This is usually in the lower abdomen above the pubic bone.    Cloudy urine    Strong- or bad-smelling urine    Unable to urinate (urinary retention)    Unable to hold urine in (urinary incontinence)    Fever    Loss of appetite    Confusion (in older adults)  Causes  Bladder infections are not contagious. You can't get one from someone else, from a toilet seat, or from sharing a bath.  The most common cause of bladder infections is bacteria from the bowels. The bacteria get onto the skin around the opening of the urethra. From there, they can get into the urine and travel up to the bladder, causing inflammation and infection. This usually happens because of:    Wiping improperly after urinating. Always wipe " from front to back.    Bowel incontinence    Pregnancy    Procedures such as having a catheter inserted    Older age    Not emptying your bladder. This can allow bacteria a chance to grow in your urine.    Dehydration    Constipation    Sex    Use of a diaphragm for birth control   Treatment  Bladder infections are diagnosed by a urine test. They are treated with antibiotics and usually clear up quickly without complications. Treatment helps prevent a more serious kidney infection.  Medicines  Medicines can help in the treatment of a bladder infection:    Take antibiotics until they are used up, even if you feel better. It is important to finish them to make sure the infection has cleared.    You can use acetaminophen or ibuprofen for pain, fever, or discomfort, unless another medicine was prescribed. If you have chronic liver or kidney disease, talk with your healthcare provider before using these medicines. Also talk with your provider if you've ever had a stomach ulcer or gastrointestinal bleeding, or are taking blood-thinner medicines.    If you are given phenazopydridine to reduce burning with urination, it will cause your urine to become a bright orange color. This can stain clothing.  Care and prevention  These self-care steps can help prevent future infections:    Drink plenty of fluids to prevent dehydration and flush out your bladder. Do this unless you must restrict fluids for other health reasons, or your doctor told you not to.    Proper cleaning after going to the bathroom is important. Wipe from front to back after using the toilet to prevent the spread of bacteria.    Urinate more often. Don't try to hold urine in for a long time.    Wear loose-fitting clothes and cotton underwear. Avoid tight-fitting pants.    Improve your diet and prevent constipation. Eat more fresh fruit and vegetables, and fiber, and less junk and fatty foods.    Avoid sex until your symptoms are gone.    Avoid caffeine,  alcohol, and spicy foods. These can irritate your bladder.    Urinate right after intercourse to flush out your bladder.    If you use birth control pills and have frequent bladder infections, discuss it with your doctor.  Follow-up care  Call your healthcare provider if all symptoms are not gone after 3 days of treatment. This is especially important if you have repeat infections.  If a culture was done, you will be told if your treatment needs to be changed. If directed, you can call to find out the results.  If X-rays were done, you will be told if the results will affect your treatment.  Call 911  Call 911 if any of the following occur:    Trouble breathing    Hard to wake up or confusion    Fainting or loss of consciousness    Rapid heart rate  When to seek medical advice  Call your healthcare provider right away if any of these occur:    Fever of 100.4 F (38.0 C) or higher, or as directed by your healthcare provider    Symptoms are not better by the third day of treatment    Back or belly (abdominal) pain that gets worse    Repeated vomiting, or unable to keep medicine down    Weakness or dizziness    Vaginal discharge    Pain, redness, or swelling in the outer vaginal area (labia)  Date Last Reviewed: 10/1/2016    8512-6400 [employer]. [address] [place] [address], [place] [address]. All rights reserved. This information is not intended as a substitute for professional medical care. Always follow your healthcare professional's instructions.

## 2019-06-13 NOTE — TELEPHONE ENCOUNTER
Sepideh- see [id number] message below.  Please advise.  Reyna Luciano RN      6/12/19  ASSESSMENT:         ICD-10-CM     1. Acute cystitis without hematuria N30.00 cefdinir (OMNICEF) 300 MG capsule   2. Dysuria R30.0 UA reflex to Microscopic and Culture       Urine Microscopic   3. Abnormal urine findings R82.90 Urine Culture Aerobic Bacterial            PLAN:     UTI Adult: UA suggestive of infection today. Omnicef Rx. Urine culture is pending. Push fluids. We will communicate any changes to the antibiotic if need be based on the urine culture result. Follow up if any worsening symptoms. Patient agrees with the plan.         Followup:     If not improving or if condition worsens, follow up with your Primary Care Provider

## 2019-06-14 ENCOUNTER — OFFICE VISIT (OUTPATIENT)
Dept: FAMILY MEDICINE | Facility: CLINIC | Age: 69
End: 2019-06-14
Payer: COMMERCIAL

## 2019-06-14 ENCOUNTER — HOSPITAL ENCOUNTER (OUTPATIENT)
Dept: MRI IMAGING | Facility: CLINIC | Age: 69
End: 2019-06-14
Attending: PHYSICIAN ASSISTANT
Payer: COMMERCIAL

## 2019-06-14 ENCOUNTER — HOSPITAL ENCOUNTER (OUTPATIENT)
Dept: MRI IMAGING | Facility: CLINIC | Age: 69
Discharge: HOME | End: 2019-06-14
Attending: PHYSICIAN ASSISTANT | Admitting: PHYSICIAN ASSISTANT
Payer: COMMERCIAL

## 2019-06-14 ENCOUNTER — HOSPITAL ENCOUNTER (OUTPATIENT)
Dept: OCCUPATIONAL THERAPY | Facility: CLINIC | Age: 69
Setting detail: THERAPIES SERIES
End: 2019-06-14
Attending: ORTHOPAEDIC SURGERY
Payer: COMMERCIAL

## 2019-06-14 ENCOUNTER — OFFICE VISIT (OUTPATIENT)
Dept: PEDIATRICS | Facility: CLINIC | Age: 69
End: 2019-06-14
Payer: COMMERCIAL

## 2019-06-14 VITALS
HEART RATE: 71 BPM | DIASTOLIC BLOOD PRESSURE: 81 MMHG | RESPIRATION RATE: 18 BRPM | OXYGEN SATURATION: 98 % | TEMPERATURE: 97.5 F | SYSTOLIC BLOOD PRESSURE: 136 MMHG

## 2019-06-14 VITALS — WEIGHT: 151 LBS | TEMPERATURE: 97.5 F | BODY MASS INDEX: 23.65 KG/M2 | RESPIRATION RATE: 16 BRPM

## 2019-06-14 DIAGNOSIS — R42 VERTIGO: ICD-10-CM

## 2019-06-14 DIAGNOSIS — Z85.831 HISTORY OF SARCOMA: ICD-10-CM

## 2019-06-14 DIAGNOSIS — R42 VERTIGO: Primary | ICD-10-CM

## 2019-06-14 DIAGNOSIS — R42 DIZZINESS: ICD-10-CM

## 2019-06-14 DIAGNOSIS — R41.0 CONFUSION: ICD-10-CM

## 2019-06-14 DIAGNOSIS — I74.3: ICD-10-CM

## 2019-06-14 PROCEDURE — 70544 MR ANGIOGRAPHY HEAD W/O DYE: CPT | Mod: XU

## 2019-06-14 PROCEDURE — 97140 MANUAL THERAPY 1/> REGIONS: CPT | Mod: GO | Performed by: OCCUPATIONAL THERAPIST

## 2019-06-14 PROCEDURE — 99207 ZZC FIRST ORDER ACUTE REFERRAL: CPT | Performed by: PHYSICIAN ASSISTANT

## 2019-06-14 PROCEDURE — 70549 MR ANGIOGRAPH NECK W/O&W/DYE: CPT

## 2019-06-14 PROCEDURE — 70553 MRI BRAIN STEM W/O & W/DYE: CPT

## 2019-06-14 PROCEDURE — A9585 GADOBUTROL INJECTION: HCPCS | Performed by: PHYSICIAN ASSISTANT

## 2019-06-14 PROCEDURE — 25500064 ZZH RX 255 OP 636: Performed by: PHYSICIAN ASSISTANT

## 2019-06-14 PROCEDURE — 99215 OFFICE O/P EST HI 40 MIN: CPT

## 2019-06-14 RX ORDER — GADOBUTROL 604.72 MG/ML
10 INJECTION INTRAVENOUS ONCE
Status: COMPLETED | OUTPATIENT
Start: 2019-06-14 | End: 2019-06-14

## 2019-06-14 RX ORDER — MECLIZINE HYDROCHLORIDE 25 MG/1
25 TABLET ORAL 3 TIMES DAILY PRN
Qty: 30 TABLET | Refills: 0 | Status: SHIPPED | OUTPATIENT
Start: 2019-06-14 | End: 2019-09-26

## 2019-06-14 RX ADMIN — GADOBUTROL 10 ML: 604.72 INJECTION INTRAVENOUS at 13:05

## 2019-06-14 NOTE — Clinical Note
Patient had normal MRI/MRAI of Brain/[place]/CarotidsPatient [facility], scheduled for June 26thUse Meclizine as needed for VertigoEpley Maneuvers at homeFollow up with PCP for recheck as needed

## 2019-06-14 NOTE — PROGRESS NOTES
SUBJECTIVE:   Angeli Jacobson is a 69 year old female presenting with a chief complaint of having some dizziness, feeling off balance at times.  She has experienced some vertigo the last 10-14 days, but it seems to have been worsened with head movements and body position.  Onset of symptoms was 10-14 days ago.  Course of illness is waxing and waning.    Severity mild to moderate  Current and Associated symptoms: vertigo with head position and body movements  Treatment measures tried include meclizine  Predisposing factors include hx of some vertigo, age.    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    CHRONIC LEFT THIGH LYMPHEDEMA     Nontoxic multinodular goiter 2005    needs yearly US     Osteoporosis, unspecified 2001     Other lymphedema 2000    left thigh, gets regular PT for this     PAD (peripheral artery disease) (H) 4/20/2018     PONV (postoperative nausea and vomiting)      SHINGLES 2001     Sprain of lumbosacral (joint) (ligament) 1995    right     Unspecified hearing loss 1998    chronic tinnitus     Unspecified tinnitus 1998        Allergies   Allergen Reactions     Celexa [Citalopram Hydrobromide]      Decreased libido     Family History   Problem Relation Age of Onset     C.A.D. Father         MI 57     Alcohol/Drug Father         etoh     Obesity Mother      Osteoporosis Mother      Colon Cancer Brother 70     Hyperlipidemia Son      Hyperlipidemia Son         very high, experimental drug      DANIELA. Paternal Grandmother         ascvd     Diabetes Maternal Grandmother      Cancer Maternal Grandmother      DANIELA. Paternal Uncle         Mi  age 48     Cancer Maternal Aunt         pancreatic CA       Social History     Tobacco Use     Smoking status: Never Smoker     Smokeless tobacco: Never Used   Substance Use Topics     Alcohol use: No       ROS:  CONSTITUTIONAL:POSITIVE  for feeling vertigo at times  INTEGUMENTARY/SKIN: NEGATIVE for worrisome rashes, moles or lesions  EYES: NEGATIVE for vision changes or irritation  ENT/MOUTH: NEGATIVE for ear, mouth and throat problems  RESP:NEGATIVE for significant cough or SOB  CV: NEGATIVE for chest pain, palpitations or peripheral edema  GI: NEGATIVE for nausea, abdominal pain, heartburn, or change in bowel habits  : negative for dysuria  MUSCULOSKELETAL: NEGATIVE for significant arthralgias or myalgia  NEURO: POSITIVE for vertigo with head movements and body position, sitting up or rolling over    OBJECTIVE  :/81 (BP Location: Left arm, Patient Position: Chair, Cuff Size: Adult Regular)   Pulse 71   Temp 97.5  F (36.4  C) (Oral)   Resp 18   LMP 2005   SpO2 98%   GENERAL APPEARANCE: healthy, alert and no distress  EYES: EOMI,  PERRL, conjunctiva clear  HENT: ear canals and TM's normal.  Nose and mouth without ulcers, erythema or lesions  NECK: supple, nontender, no lymphadenopathy  RESP: lungs clear to auscultation - no rales, rhonchi or wheezes  CV: regular rates and rhythm, normal S1 S2, no murmur noted  ABDOMEN:  soft, nontender, no HSM or masses and bowel sounds normal  Extremities: no peripheral edema or tenderness, peripheral pulses normal  MS: extremities normal- no gross deformities noted, no erythema, FROM noted in all extremities  NEURO: Normal strength and tone, sensory exam grossly normal,  normal speech and mentation. CN 2-12 grossly intact.   Normal finger to nose bilaterally.  A&Ox3  SKIN: no suspicious lesions or  lesley    MRI BRAIN WITHOUT AND WITH CONTRAST  6/14/2019 1:42 PM     HISTORY:  Neuro deficit(s), subacute. Focal neuro deficit, greater  than 6 hours, stroke suspected. Vertigo. Dizziness. Confusion.      TECHNIQUE:  Multiplanar, multisequence MRI of the brain without and  with 10 mL Gadavist.     COMPARISON: Head CT 4/13/2006     FINDINGS:  Mild volume loss is present. Frontoparietal predominant  periventricular and subcortical T2 FLAIR hyperintensities likely  represent chronic small vessel ischemic change. The cerebral  hemispheres, brainstem, and cerebellum otherwise demonstrate normal  morphology and signal. No evidence of acute ischemia, hemorrhage,  mass, mass effect, or hydrocephalus. The visualized calvarium,  tympanic cavities, and left mastoid cavity are unremarkable. There is  trace opacification of the right mastoid cavity, likely inflammatory.  Left maxillary sinus retention cyst is present.                                                                      IMPRESSION: No evidence of acute ischemia. Mild volume loss and white  matter T2 hyperintensities likely reflecting chronic small vessel  ischemic change.    MR ANGIOGRAM OF THE HEAD WITHOUT CONTRAST   6/14/2019 12:54 PM      HISTORY: Epidemic vertigo. Vertigo. Dizziness. Confusion.     TECHNIQUE:  3D time-of-flight MR angiogram of the head without  contrast.     COMPARISON: None.     FINDINGS: The vertebral arteries, basilar artery, and posterior  cerebral arteries demonstrate normal flow-related enhancement. The  internal carotid arteries, anterior cerebral arteries, and middle  cerebral arteries demonstrate normal flow-related enhancement. No  evidence of large vessel occlusion. No aneurysms are identified.                                                                      IMPRESSION:  Unremarkable MR angiogram of the head.    MRA NECK WITHOUT AND WITH CONTRAST  6/14/2019 1:48 PM      HISTORY: Vertigo, dizziness, confusion.     TECHNIQUE: 2D  time-of-flight MR angiogram of the neck without contrast  and 3D MR angiogram of the neck with 10 mL Gadavist. Estimates of  carotid stenoses are made relative to the distal internal carotid  artery diameters except as noted.     COMPARISON: None.     FINDINGS:    Normal origin of the great vessels from the aortic arch.     Right carotid artery: The right common and internal carotid arteries  are patent. No significant stenosis.       Left carotid artery: The left common and internal carotid arteries are  patent. No significant stenosis.       Vertebral arteries: Vertebral arteries appear patent without evidence  of dissection. No significant stenosis.                                                                        IMPRESSION:  Unremarkable MR angiogram of the neck.       ASSESSMENT/PLAN:      ICD-10-CM    1. Vertigo R42 MRA Brain ([place]) wo Contrast     MR Brain w/o & w Contrast     IV access     sodium chloride (PF) 0.9% PF flush 10 mL     MRA Neck (Carot[place]) wo & w Contrast     PHYSICAL THERAPY REFERRAL     meclizine (ANTIVERT) 25 MG tablet   2. Dizziness R42 MRA Brain ([place]) wo Contrast     MR Brain w/o & w Contrast     IV access     sodium chloride (PF) 0.9% PF flush 10 mL     MRA Neck ([place]) wo & w Contrast     PHYSICAL THERAPY REFERRAL   3. Confusion R41.0 MRA Brain ([place]) wo Contrast     MR Brain w/o & w Contrast     IV access     sodium chloride (PF) 0.9% PF flush 10 mL     MRA Neck ([place]) wo & w Contrast   4. History of sarcoma Z85.831          Orders Placed This Encounter     MRA Brain ([place]) wo Contrast     MR Brain w/o & w Contrast     MRA Neck ([place]) wo & w Contrast     sodium chloride (PF) 0.9% PF flush 10 mL       Patient had normal MRI/MRAI of Brain/[place]/Carotids  [facility], [facility] 26th  Use Meclizine as needed for Vertigo  Epley Maneuvers at home  Follow up with PCP for  recheck as needed

## 2019-06-14 NOTE — PROGRESS NOTES
Nursing Note:  Patient  presents today for vertigo, dizziness, confusion and a history of sarcoma .    Patient seen by provider today: No   present during visit today: Not Applicable.     Note: Pt states she has vertigo, dizziness and confusion today.      Intravenous Access:  Implanted Port.     Treatment Conditions:  Not Applicable.        Assessment:  Patient tolerated IV insertion without incident.  Blood return noted with IV insertion  Site patent and intact, free from redness, edema or discomfort.  No evidence of extravasations.  Access discontinued per protocol.         Discharge Plan:   Discharge instructions reviewed with: Patien by provider.  Patient and/or family verbalized understanding of discharge instructions and all questions answered.  Copy of AVS reviewed with patient and/or family.    Patient discharged in stable condition accompanied by: self and with   Departure Mode: Ambulatory.     Marsha Swenson RN

## 2019-06-14 NOTE — PATIENT INSTRUCTIONS
Patient Education     Benign Paroxysmal Positional Vertigo    Benign paroxysmal positional vertigo is a common condition. You feel as if the room is spinning after changing position, moving your head quickly, or even just rolling over in bed.  Vertigo is a false feeling of motion plus disorientation that makes it seem as though the room is spinning. A vertigo attack may cause sudden nausea, vomiting, and heavy sweating. Severe vertigo causes a loss of balance. You may even fall down.  Vertigo is caused by a problem with the inner ear. The inner ear is located behind the middle ear. It is a part of the balance center of the body. It contains small calcium particles within fluid-filled canals (semi-circular canals). These particles can move out of position. This may happen as a result of aging, head injury, or disease of the inner ear. Once that happens, moving your head in certain ways may cause the particles to stimulate the inner ear. This creates the feeling of vertigo.  An episode of vertigo may last seconds, minutes, or hours. Once you are over the first episode of vertigo, it may never return. Sometimes symptoms return off and on for several weeks or longer.  Home care  Follow these guidelines when caring for yourself at home:    Rest quietly in bed if your symptoms are severe. Change position slowly. There is usually 1 position that will feel best. This might be lying on 1 side or lying on your back with your head slightly raised on pillows. Until you have no symptoms, you are at a higher risk of falling. Let someone help you when you get up. Get rid of home hazards such as loose electrical cords and throw rugs. Don t walk in unfamiliar areas that are not lighted. Use night lights in bathrooms and kitchen areas.    Do not drive or work with dangerous machinery for 1 week after symptoms go away. This is in case symptoms return suddenly.    Take medicine as prescribed to relieve your symptoms. Unless another  medicine was prescribed for nausea, vomiting, and vertigo, you may use over-the-counter motion sickness medicine. Examples of this include meclizine and dimenhydrinate.  Follow-up care  Follow up with your healthcare provider, or as directed. Tell your provider about any ringing in your ear or hearing loss.  If you had a CT or MRI scan, a specialist will review it. You will be told of any new findings that may affect your care.  When to seek medical advice  Call your healthcare provider right away if any of these occur:    Vertigo gets worse even after taking prescribed medicine    Repeated vomiting even after taking prescribed medicine    Weakness that gets worse    Fainting    Severe headache or unusual drowsiness or confusion    Weakness of an arm or leg or 1 side of the face    Trouble walking    Trouble with speech or vision    Seizure    Trouble hearing    Fever of 100.4 F (38 C) or higher, or as directed by your healthcare provider    Fast heart rate    Chest pain   Date Last Reviewed: 11/1/2017 2000-2018 [employer]. [address] [place] [address], [place] [address]. All rights reserved. This information is not intended as a substitute for professional medical care. Always follow your healthcare professional's instructions.

## 2019-06-14 NOTE — PROGRESS NOTES
Subjective     Angeli Jacobson is a 69 year old female who presents to clinic today for the following health issues:    HPI   ED/UC Followup:    Facility:  [facility]  Date of visit: 06/04/2019  Reason for visit: vertigo  Current Status: has had improvement originally, but now has worsened again after the last 3 days.     In summary, patient presented to the ED on 6/4 with new onset vertigo after recent air travel. Exam was benign and no evidence suggesting acute centralized process. Supportive care of meclizine and prn zofran with home therapy was suggested.     Symptoms have continued. Described as room spinning with turning head to left. Worse in AM with sitting up. No headaches, vision changes, slurred speech, or weakness. Does admit to chronic ringing in the ears. High pitched. No worsening. No passing out or LOC. No chest pain or shortness of breath. No palpitations.     Of note, patient has history of sarcoma and PAD.        Patient Active Problem List   Diagnosis     Pain in joint, multiple sites     MULTINODUL GOITER     Hearing loss     Hyperlipidemia LDL goal <70     Osteoporosis     Cervicalgia     Major depressive disorder, recurrent episode, moderate (H)     Impaired fasting glucose     Lymphedema of left leg     Tubular adenoma     Other constipation     Vertigo     Myxoid liposarcoma (HCC)     PAD (peripheral artery disease) (H)     Vascular graft occlusion (H)     Femoral-popliteal bypass graft occlusion, left (H)     History of sarcoma     S/P ORIF (open reduction internal fixation) fracture     Hip pain, left     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)     Past Surgical History:   Procedure Laterality Date     BYPASS GRAFT FEMOROPOPLITEAL Left 1/21/2019    Procedure: LEFT FEMORAL TO ABOVE KNEE POPLITEAL  BYPASS WITH POLYTETRAFLUOROETHYLENE GRAFT;  Surgeon: Shade Owens MD;  Location: [facility] OR     C APPENDECTOMY      Appendectomy     C NONSPECIFIC PROCEDURE  04/2000    Open  Biopsy Left Thigh Liposarcoma     COLONOSCOPY N/A 2016    Procedure: COMBINED COLONOSCOPY, SINGLE OR MULTIPLE BIOPSY/POLYPECTOMY BY BIOPSY;  Surgeon: Varun Stanley MD, MD;  Location: [facility] GI     EXCISION MALIG LESION>1.25CM  2000    Myxoid Liposarcoma       EXPLORE GROIN Right 2018    Procedure: EXPLORE GROIN;  EMERGENCY RIGHT FEMORAL EXPLORATION WITH FEMORAL ARTERY REPAIR.    EBL: 50mL;  Surgeon: Shade wOens MD;  Location: [facility] OR     HC COLONOSCOPY THRU STOMA, DIAGNOSTIC      due      HC COLP CERVIX/UPPER VAGINA  1997    Negative     HC DILATION/CURETTAGE DIAG/THER NON OB  1997    Post menopausal bleeding on HRT, negative     HIP SURGERY Left 2019     IR ANGIOGRAM THROUGH CATHETER FOLLOW UP  2018     IR ANGIOGRAM THROUGH CATHETER FOLLOW UP  2018     IR LOWER EXTREMITY ANGIOGRAM LEFT  2018     VASCULAR SURGERY  2018    Left SFA stent in bypass graft       Social History     Tobacco Use     Smoking status: Never Smoker     Smokeless tobacco: Never Used   Substance Use Topics     Alcohol use: No     Family History   Problem Relation Age of Onset     C.A.D. Father         MI 57     Alcohol/Drug Father         etoh     Obesity Mother      Osteoporosis Mother      Colon Cancer Brother 70     Hyperlipidemia Son      Hyperlipidemia Son         very high, experimental drug     C.A.D. Paternal Grandmother         ascvd     Diabetes Maternal Grandmother      Cancer Maternal Grandmother      C.A.D. Paternal Uncle         Mi  age 48     Cancer Maternal Aunt         pancreatic CA         Current Outpatient Medications   Medication Sig Dispense Refill     Acetaminophen 325 MG CAPS Take 500 mg by mouth 2 times daily as needed        alendronate (FOSAMAX) 70 MG tablet Take 1 tablet (70 mg) by mouth with 8oz water every 7 days 30 minutes before breakfast and remain upright during this time. (Patient taking differently: Take 70 mg by mouth every 7 days Take 1 tablet  (70 mg) by mouth with 8oz water every 7 days 30 minutes before breakfast and remain upright during this time.) 12 tablet 3     aspirin (ASA) 81 MG chewable tablet Take 81 mg by mouth daily       CALCIUM PO Take 1 tablet by mouth daily       clopidogrel (PLAVIX) 75 MG tablet Take 1 tablet (75 mg) by mouth daily 90 tablet 3     ezetimibe (ZETIA) 10 MG tablet Take 1 tablet (10 mg) by mouth daily 90 tablet 3     meclizine (ANTIVERT) 25 MG tablet Take 1-2 tablets (25-50 mg) by mouth every 6 hours as needed for dizziness 24 tablet 0     multivitamin, therapeutic (THERA-VIT) TABS tablet Take 1 tablet by mouth daily       nitroFURantoin macrocrystal-monohydrate (MACROBID) 100 MG capsule Take 1 capsule (100 mg) by mouth 2 times daily for 7 days 14 capsule 0     ondansetron (ZOFRAN ODT) 4 MG ODT tab Take 1 tablet (4 mg) by mouth every 8 hours as needed for nausea 10 tablet 0     order for DME Equipment being ordered: Walker Wheels () and Walker ()  Treatment Diagnosis: difficulty walking 1 each 0     order for DME Equipment being ordered: Left Thigh High Compression Stocking, 550 CCL.3 1 each 99     ORDER FOR DME Equipment being ordered: lymphedema bandages 2 Device 1     oxybutynin ER (DITROPAN-XL) 5 MG 24 hr tablet Take 1 tablet (5 mg) by mouth daily 90 tablet 1     rosuvastatin (CRESTOR) 40 MG tablet Take 1 tablet (40 mg) by mouth every evening 90 tablet 3     venlafaxine (EFFEXOR-XR) 37.5 MG 24 hr capsule Take 1 capsule (37.5 mg) by mouth daily (with breakfast) 7 capsule 0     Allergies   Allergen Reactions     Celexa [Citalopram Hydrobromide]      Decreased libido     BP Readings from Last 3 Encounters:   06/12/19 130/80   06/04/19 157/77   06/04/19 122/74    Wt Readings from Last 3 Encounters:   06/14/19 68.5 kg (151 lb)   06/04/19 67.1 kg (148 lb)   06/04/19 68 kg (150 lb)                    Reviewed and updated as needed this visit by Provider  Tobacco  Allergies  Meds  Problems  Med Hx  Surg Hx  Fam  Hx         Review of Systems   ROS COMP: Constitutional, HEENT, cardiovascular, pulmonary, GI, , musculoskeletal, neuro, skin, endocrine and psych systems are negative, except as otherwise noted.      Objective    Temp 97.5  F (36.4  C) (Oral)   Resp 16   Wt 68.5 kg (151 lb)   LMP 03/18/2005   Breastfeeding? No   BMI 23.65 kg/m    Body mass index is 23.65 kg/m .  Physical Exam   GENERAL: alert and no distress  RESP: lungs clear to auscultation - no rales, rhonchi or wheezes  CV: regular rates and rhythm, normal S1 S2, no S3 or S4 and no murmur, click or rub  NEURO: Normal strength and tone, sensory exam grossly normal, mentation intact, speech normal, cranial nerves 2-12 intact, rapid alternating movements normal and finger to nose normal. Unable to assess gait and romberg accurately due to left boot worn for lymphedema.   PSYCH: mentation appears normal, affect normal/bright    Diagnostic Test Results:  none         Assessment & Plan     (R42) Vertigo  (primary encounter diagnosis)  Comment: ongoing for ~10 days. History and benign exam suggests possible persistent BPPV. However, does have risk factors for centralized etiology such as occlusion or malignancy. Recommending MRI and MRA with possible vestibular therapy referral if negative. Recommending DOD evaluation. Referral placed. Discussed case with Ned at [facility]. Patient expected at ~11 AM after her lymphedema appt.   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 Risk factor.   Plan:        Follow up: [facility]    Return in about 1 day (around 6/15/2019) for day of diagnosis.    Good [place]osa PA-C  [facility]

## 2019-06-16 LAB
BACTERIA SPEC CULT: ABNORMAL
SPECIMEN SOURCE: ABNORMAL

## 2019-06-24 ENCOUNTER — THERAPY VISIT (OUTPATIENT)
Dept: PHYSICAL THERAPY | Facility: CLINIC | Age: 69
End: 2019-06-24
Payer: COMMERCIAL

## 2019-06-24 DIAGNOSIS — Z98.890 S/P ORIF (OPEN REDUCTION INTERNAL FIXATION) FRACTURE: ICD-10-CM

## 2019-06-24 DIAGNOSIS — M25.552 HIP PAIN, LEFT: ICD-10-CM

## 2019-06-24 DIAGNOSIS — Z87.81 S/P ORIF (OPEN REDUCTION INTERNAL FIXATION) FRACTURE: ICD-10-CM

## 2019-06-24 PROCEDURE — 97530 THERAPEUTIC ACTIVITIES: CPT | Mod: GP | Performed by: PHYSICAL THERAPIST

## 2019-06-24 PROCEDURE — 97110 THERAPEUTIC EXERCISES: CPT | Mod: GP | Performed by: PHYSICAL THERAPIST

## 2019-06-24 PROCEDURE — 97112 NEUROMUSCULAR REEDUCATION: CPT | Mod: GP | Performed by: PHYSICAL THERAPIST

## 2019-06-26 ENCOUNTER — HOSPITAL ENCOUNTER (OUTPATIENT)
Dept: PHYSICAL THERAPY | Facility: CLINIC | Age: 69
Setting detail: THERAPIES SERIES
End: 2019-06-26
Attending: PHYSICIAN ASSISTANT
Payer: COMMERCIAL

## 2019-06-26 DIAGNOSIS — R42 VERTIGO: ICD-10-CM

## 2019-06-26 DIAGNOSIS — R42 DIZZINESS: ICD-10-CM

## 2019-06-26 PROCEDURE — 95992 CANALITH REPOSITIONING PROC: CPT | Mod: GP | Performed by: PHYSICAL THERAPIST

## 2019-06-26 PROCEDURE — 97161 PT EVAL LOW COMPLEX 20 MIN: CPT | Mod: GP | Performed by: PHYSICAL THERAPIST

## 2019-06-26 NOTE — PROGRESS NOTES
"   06/26/19 1000   Quick Adds   Quick Adds Certification;Vestibular Eval   Type of Visit Initial OP PT Evaluation   General Information   Start of Care Date 06/26/19   Referring Physician Ned Joseph, COURTNEY    Orders Evaluate and Treat as Indicated   Order Date 06/14/19   Medical Diagnosis Vertigo R42, Dizziness R42   Onset of illness/injury or Date of Surgery 06/04/19   Surgical/Medical history reviewed Yes   Pertinent history of current vestibular problem (include personal factors and/or comorbidities that impact the POC)  Hearing loss   Hearing Loss Comments B loss, wears aides. Tinnitus at baseline. no change in hearing with onset of vertigo   Pertinent history of current problem (include personal factors and/or comorbidities that impact the POC) Patient reports she was dx with \"intermittent meniere's disease\" after an epsiode of vertigo in 2000. Since then she has had epsiodes of vertigo about every year or two that last about a day. She has not had any intervention in the past, they have just resolved on their own. She has not had any audiologic vestibular testing, per her report. When vertigo comes on, it is triggered with movement of her head/body ie laying into bed or rolling in bed and lasts about 30\" at a time. This past epsiode was the worst she has had, including nausea and vomiting so she went to the ED. She was treated with Meclizine and antivert and discharged home. She took the meds about 2 days and have been fine since. She has not felt the vertigo and doesnt believe she is avoiding any certain movements. balance was worse when dizzy but seems back to baseline. Her baseline has been reduced since a fall in April. she has been working with a PT for strengthening, balance and gait training.    Pertinent Visual History  no visual changes, wears bifocal progressives   Prior level of function comment indep, using cane for support with lymphedema in L leg/knee   Previous/Current Treatment " Medication(s)   Improvement after medication Significant   Current Community Support Family/friend caregiver   Patient role/Employment history Retired   Living environment House/[facility]   Current Assistive Devices Standard Cane   Patient/Family Goals Statement resolution of dizziness episodes   Fall Risk Screen   Fall screen completed by PT   Have you fallen 2 or more times in the past year? No   Have you fallen and had an injury in the past year? Yes   Is patient a fall risk? Yes   Fall screen comments hip fracture after fall in april, currently receivng PT elsewhere to address gait and balance so not assessed here.   Abuse Screen (yes response referral indicated)   Physical Signs of Abuse Present no   System Outcome Measures   Outcome Measures BPPV   Dizziness Handicap Inventory (score out of 100) A decrease in score by 17.18 or greater indicates a clinically significant change in symptoms. 6   Pain   Patient currently in pain No   Cognitive Status Examination   Orientation orientation to person, place and time   Level of Consciousness alert   Follows Commands and Answers Questions 100% of the time   Personal Safety and Judgment intact   Posture   Posture Forward head position   Bed Mobility   Bed Mobility Comments indep   Transfer Skills   Transfer Comments indep, slow, assist with UEs   Gait   Gait Comments patient is ambulating with SEC, gait is slow and guarded, reaching for walls   Cervicogenic Screen   Neck ROM sufficient for positional testing   Oculomotor Exam   Smooth Pursuit Normal   Saccades Other   Saccades Comments hypometric vertical, 2 moves to target. Horiz WNL   VOR Normal   Rapid Head Thrust Normal   Infrared Goggle Exam or Frenzel Lense Exam   Vestibular Suppressant in Last 24 Hours? No   Exam completed with Infrared Goggles   Spontaneous Nystagmus Negative   Gaze Evoked Nystagmus Negative   Head Shake Horizontal Nystagmus Negative   [place]-Hallpike (right) Other   Taisha-Hallpike (right) comments no  nystagmus noted in hallpike but downbeating L (suspected as reversal) and vertigo with return to sit.    Taisha-Hallpike (Left) Other   [place]-Hallpike (left) comments no nystagmus in hallpike but downbeating R (suspected as reversal), vertigo and nausea with return to sit.   [facility] Supine Roll Test (Right) Negative   [facility] Supine Roll Test (Left) Negative   BPPV Canal(s) L Posterior;R Posterior   BPPV Type Canalithasis   Planned Therapy Interventions   Planned Therapy Interventions other (see comments)   Planned Therapy Interventions Comment CRM   Clinical Impression   Criteria for Skilled Therapeutic Interventions Met yes, treatment indicated   PT Diagnosis s/s B BPPV   Influenced by the following impairments intermittent vertigo, nausea and imbalance   Functional limitations due to impairments bed mobility, looking up during household tasks   Clinical Presentation Stable/Uncomplicated   Clinical Decision Making (Complexity) Low complexity   Therapy Frequency 1 time/week   Predicted Duration of Therapy Intervention (days/wks) 4 weeks   Risk & Benefits of therapy have been explained Yes   Patient, Family & other staff in agreement with plan of care Yes   Education Assessment   Barriers to Learning No barriers   GOALS   PT Eval Goals 1;2   Goal 1   Goal Identifier 1   Goal Description Patient will score 0/100 on DHI indicating resolution of dizziness and no limitation with mobility related to dizziness.   Target Date 07/24/19   Goal 2   Goal Identifier 2   Goal Description Patient will have negative s/s of BPPV for safety with bed mobility.   Target Date 07/24/19   Total Evaluation Time   PT Eval Low Complexity Minutes (64876) 35   Therapy Certification   Certification date from 06/26/19   Certification date to 07/24/19   Medical Diagnosis Vertigo R42, Dizziness R42

## 2019-06-26 NOTE — PROGRESS NOTES
[facility]        OUTPATIENT PHYSICAL THERAPY FUNCTIONAL EVALUATION  PLAN OF TREATMENT FOR OUTPATIENT REHABILITATION  (COMPLETE FOR INITIAL CLAIMS ONLY)  Patient's Last Name, First Name, M.I.  YOB: 1950  Angeli Jacobson     Provider's Name   [facility]   Medical Record No.  [id number]     Start of Care Date:  06/26/19   Onset Date:  06/04/19   Type:     _X__PT   ____OT  ____SLP Medical Diagnosis:  Vertigo R42, Dizziness R42     PT Diagnosis:  s/s B BPPV Visits from SOC:  1                              __________________________________________________________________________________  Plan of Treatment/Functional Goals:  other (see comments)  CRM        GOALS  1  Patient will score 0/100 on DHI indicating resolution of dizziness and no limitation with mobility related to dizziness.  07/24/19    2  Patient will have negative s/s of BPPV for safety with bed mobility.  07/24/19      Therapy Frequency:  1 time/week   Predicted Duration of Therapy Intervention:  4 weeks    Lida Eason, PT                                    I CERTIFY THE NEED FOR THESE SERVICES FURNISHED UNDER        THIS PLAN OF TREATMENT AND WHILE UNDER MY CARE     (Physician co-signature of this document indicates review and certification of the therapy plan).                Certification Date From:  06/26/19   Certification Date To:  07/24/19    Referring Provider:  Ned Joseph, COURTNEY     Initial Assessment  See Epic Evaluation- Start of Care Date: 06/26/19

## 2019-07-02 DIAGNOSIS — I73.9 PAD (PERIPHERAL ARTERY DISEASE) (H): ICD-10-CM

## 2019-07-02 DIAGNOSIS — Z85.831 HISTORY OF SARCOMA: ICD-10-CM

## 2019-07-02 DIAGNOSIS — E78.5 HYPERLIPIDEMIA LDL GOAL <70: ICD-10-CM

## 2019-07-03 ENCOUNTER — HOSPITAL ENCOUNTER (OUTPATIENT)
Dept: PHYSICAL THERAPY | Facility: CLINIC | Age: 69
Setting detail: THERAPIES SERIES
End: 2019-07-03
Attending: PHYSICIAN ASSISTANT
Payer: COMMERCIAL

## 2019-07-03 DIAGNOSIS — E78.5 HYPERLIPIDEMIA LDL GOAL <70: ICD-10-CM

## 2019-07-03 PROCEDURE — 97164 PT RE-EVAL EST PLAN CARE: CPT | Mod: GP | Performed by: PHYSICAL THERAPIST

## 2019-07-03 RX ORDER — ROSUVASTATIN CALCIUM 40 MG/1
TABLET, COATED ORAL
Qty: 90 TABLET | Refills: 0 | OUTPATIENT
Start: 2019-07-03

## 2019-07-03 RX ORDER — EZETIMIBE 10 MG/1
TABLET ORAL
Qty: 90 TABLET | Refills: 0 | Status: SHIPPED | OUTPATIENT
Start: 2019-07-03 | End: 2019-08-13

## 2019-07-03 NOTE — TELEPHONE ENCOUNTER
Requested Prescriptions   Pending Prescriptions Disp Refills     ezetimibe (ZETIA) 10 MG tablet [Pharmacy Med Name: EZETIMIBE 10MG TABLETS] 30 tablet 0     Sig: TAKE 1 TABLET BY MOUTH DAILY       Antihyperlipidemic agents Failed - 7/3/2019  1:17 PM        Failed - Normal serum ALT on record in past 12 mos     Recent Labs   Lab Test 04/30/19  1243   ALT 70*             Passed - Lipid panel on file in past 12 mos     Recent Labs   Lab Test 04/30/19  1243  09/11/15  0901   CHOL 231*   < > 263*   TRIG 177*   < > 53   HDL 50   < > 66   *   < > 186*   NHDL 181*   < >  --    VLDL  --   --  11   CHOLHDLRATIO  --   --  4.0    < > = values in this interval not displayed.              Myriam Hendrickson BSN, RN

## 2019-07-03 NOTE — TELEPHONE ENCOUNTER
"Duplicate. Last filled on 1/31/19 for 90 days x 3 refills. Cristal Wilson RN PCS on 7/3/2019 at 8:40 AM      Requested Prescriptions   Pending Prescriptions Disp Refills     rosuvastatin (CRESTOR) 40 MG tablet [Pharmacy Med Name: ROSUVASTATIN 40MG TABLETS] 90 tablet 0     Sig: TAKE 1 TABLET BY MOUTH AT BEDTIME.       Statins Protocol Passed - 7/2/2019  9:41 PM        Passed - LDL on file in past 12 months     Recent Labs   Lab Test 04/30/19  1243   *             Passed - No abnormal creatine kinase in past 12 months     No lab results found.             Passed - Recent (12 mo) or future (30 days) visit within the authorizing provider's specialty     Patient had office visit in the last 12 months or has a visit in the next 30 days with authorizing provider or within the authorizing provider's specialty.  See \"Patient Info\" tab in inbasket, or \"Choose Columns\" in Meds & Orders section of the refill encounter.              Passed - Medication is active on med list        Passed - Patient is age 18 or older        Passed - No active pregnancy on record        Passed - No positive pregnancy test in past 12 months          "

## 2019-07-05 ENCOUNTER — THERAPY VISIT (OUTPATIENT)
Dept: PHYSICAL THERAPY | Facility: CLINIC | Age: 69
End: 2019-07-05
Payer: COMMERCIAL

## 2019-07-05 DIAGNOSIS — Z87.81 S/P ORIF (OPEN REDUCTION INTERNAL FIXATION) FRACTURE: ICD-10-CM

## 2019-07-05 DIAGNOSIS — M25.552 HIP PAIN, LEFT: ICD-10-CM

## 2019-07-05 DIAGNOSIS — Z98.890 S/P ORIF (OPEN REDUCTION INTERNAL FIXATION) FRACTURE: ICD-10-CM

## 2019-07-05 PROCEDURE — 97530 THERAPEUTIC ACTIVITIES: CPT | Mod: GP | Performed by: PHYSICAL THERAPIST

## 2019-07-05 PROCEDURE — 97112 NEUROMUSCULAR REEDUCATION: CPT | Mod: GP | Performed by: PHYSICAL THERAPIST

## 2019-07-05 PROCEDURE — 97110 THERAPEUTIC EXERCISES: CPT | Mod: GP | Performed by: PHYSICAL THERAPIST

## 2019-07-07 ASSESSMENT — ENCOUNTER SYMPTOMS
DIFFICULTY URINATING: 0
HEMATURIA: 0
HOT FLASHES: 0
FLANK PAIN: 0
BRUISES/BLEEDS EASILY: 1
SWOLLEN GLANDS: 0
DYSURIA: 0
DECREASED LIBIDO: 1

## 2019-07-09 NOTE — ADDENDUM NOTE
Encounter addended by: Ned Joseph PA-C on: 7/9/2019 11:27 AM   Actions taken: Order list changed, Diagnosis association updated

## 2019-07-10 ENCOUNTER — OFFICE VISIT (OUTPATIENT)
Dept: UROLOGY | Facility: CLINIC | Age: 69
End: 2019-07-10
Attending: PHYSICIAN ASSISTANT
Payer: COMMERCIAL

## 2019-07-10 VITALS
DIASTOLIC BLOOD PRESSURE: 72 MMHG | WEIGHT: 148 LBS | SYSTOLIC BLOOD PRESSURE: 122 MMHG | HEIGHT: 66 IN | BODY MASS INDEX: 23.78 KG/M2 | HEART RATE: 75 BPM

## 2019-07-10 DIAGNOSIS — N39.41 URGE INCONTINENCE OF URINE: Primary | ICD-10-CM

## 2019-07-10 DIAGNOSIS — N39.492 POSTURAL URINARY INCONTINENCE: ICD-10-CM

## 2019-07-10 DIAGNOSIS — Z87.440 PERSONAL HISTORY OF URINARY TRACT INFECTION: ICD-10-CM

## 2019-07-10 DIAGNOSIS — N32.81 OAB (OVERACTIVE BLADDER): ICD-10-CM

## 2019-07-10 LAB
ALBUMIN UR-MCNC: NEGATIVE MG/DL
APPEARANCE UR: CLEAR
BILIRUB UR QL STRIP: NEGATIVE
COLOR UR AUTO: YELLOW
GLUCOSE UR STRIP-MCNC: NEGATIVE MG/DL
HGB UR QL STRIP: ABNORMAL
HYALINE CASTS #/AREA URNS LPF: 5 /LPF (ref 0–2)
KETONES UR STRIP-MCNC: NEGATIVE MG/DL
LEUKOCYTE ESTERASE UR QL STRIP: NEGATIVE
MUCOUS THREADS #/AREA URNS LPF: PRESENT /LPF
NITRATE UR QL: NEGATIVE
PH UR STRIP: 5.5 PH (ref 5–7)
RBC #/AREA URNS AUTO: 2 /HPF (ref 0–2)
RESULT: 20 ML
SOURCE: ABNORMAL
SP GR UR STRIP: 1.01 (ref 1–1.03)
UROBILINOGEN UR STRIP-ACNC: 0.2 EU/DL (ref 0.2–1)
WBC #/AREA URNS AUTO: <1 /HPF (ref 0–5)

## 2019-07-10 PROCEDURE — 87086 URINE CULTURE/COLONY COUNT: CPT | Performed by: UROLOGY

## 2019-07-10 PROCEDURE — 99214 OFFICE O/P EST MOD 30 MIN: CPT | Mod: 25 | Performed by: UROLOGY

## 2019-07-10 PROCEDURE — 88112 CYTOPATH CELL ENHANCE TECH: CPT | Performed by: UROLOGY

## 2019-07-10 PROCEDURE — 81015 MICROSCOPIC EXAM OF URINE: CPT | Performed by: UROLOGY

## 2019-07-10 PROCEDURE — 51798 US URINE CAPACITY MEASURE: CPT | Performed by: UROLOGY

## 2019-07-10 PROCEDURE — 81003 URINALYSIS AUTO W/O SCOPE: CPT | Performed by: UROLOGY

## 2019-07-10 ASSESSMENT — ENCOUNTER SYMPTOMS
SYNCOPE: 0
FEVER: 0
DECREASED APPETITE: 0
DYSURIA: 0
SINUS PAIN: 0
DIZZINESS: 0
TINGLING: 0
NAUSEA: 0
BREAST MASS: 0
CLAUDICATION: 0
JOINT SWELLING: 0
SPUTUM PRODUCTION: 0
SINUS CONGESTION: 0
EYE IRRITATION: 0
STIFFNESS: 0
LEG SWELLING: 0
EXTREMITY NUMBNESS: 0
BACK PAIN: 0
NECK PAIN: 0
LEG PAIN: 0
SWOLLEN GLANDS: 0
PANIC: 0
HYPOTENSION: 0
COUGH DISTURBING SLEEP: 0
LIGHT-HEADEDNESS: 0
ABDOMINAL PAIN: 0
POSTURAL DYSPNEA: 0
SMELL DISTURBANCE: 0
BRUISES/BLEEDS EASILY: 1
NECK MASS: 0
RECTAL BLEEDING: 0
DECREASED LIBIDO: 1
COUGH: 0
DECREASED CONCENTRATION: 0
EYE PAIN: 0
VOMITING: 0
RESPIRATORY PAIN: 0
MYALGIAS: 0
TACHYCARDIA: 0
HEMOPTYSIS: 0
WEAKNESS: 0
NAIL CHANGES: 0
HEADACHES: 0
CONSTIPATION: 0
WHEEZING: 0
HEMATURIA: 0
WEIGHT LOSS: 0
DIARRHEA: 0
LOSS OF CONSCIOUSNESS: 0
SNORES LOUDLY: 0
ARTHRALGIAS: 0
TROUBLE SWALLOWING: 0
EYE REDNESS: 0
ORTHOPNEA: 0
PARALYSIS: 0
SEIZURES: 0
SLEEP DISTURBANCES DUE TO BREATHING: 0
POLYDIPSIA: 0
TASTE DISTURBANCE: 0
NUMBNESS: 0
DISTURBANCES IN COORDINATION: 0
JAUNDICE: 0
WEIGHT GAIN: 0
INCREASED ENERGY: 0
SHORTNESS OF BREATH: 0
SPEECH CHANGE: 0
DYSPNEA ON EXERTION: 0
DEPRESSION: 0
CHILLS: 0
HEARTBURN: 0
SORE THROAT: 0
NERVOUS/ANXIOUS: 0
EXERCISE INTOLERANCE: 0
ALTERED TEMPERATURE REGULATION: 0
FATIGUE: 0
BOWEL INCONTINENCE: 0
TREMORS: 0
INSOMNIA: 0
MEMORY LOSS: 0
EYE WATERING: 0
DIFFICULTY URINATING: 0
NIGHT SWEATS: 0
HYPERTENSION: 0
MUSCLE WEAKNESS: 0
HALLUCINATIONS: 0
HOT FLASHES: 0
DOUBLE VISION: 0
POLYPHAGIA: 0
HOARSE VOICE: 0
BLOATING: 0
RECTAL PAIN: 0
SKIN CHANGES: 0
POOR WOUND HEALING: 0
BLOOD IN STOOL: 0
FLANK PAIN: 0
PALPITATIONS: 0
MUSCLE CRAMPS: 0
BREAST PAIN: 0

## 2019-07-10 ASSESSMENT — MIFFLIN-ST. JEOR: SCORE: 1213.07

## 2019-07-10 ASSESSMENT — PAIN SCALES - GENERAL: PAINLEVEL: NO PAIN (0)

## 2019-07-10 NOTE — PROGRESS NOTES
July 10, 2019    Referring Provider: Sepideh Burris PA-C  [address] [place] [address]    Primary Care Provider: Sepideh Burris    CC: incontinence and pelvic prolapse    HPI:  Angeli Jacobson is a 69 year old female who presents for evaluation of her pelvic floor symptoms.  She has urgency incontinence throughout the day with occasional insensate incontinence, and night time urgency/incontinence 4x per night. She wears a pad every day, and can manage her urgency symptoms if she schedules herself to toilet every two hours. She is concerning that things were worsening in the past 1.5 years, which did not improve with trial of a pessary. She started on oxybutynin two months ago which has decreased her night urgency. She has had a recent left hip fracture with repair that resulted in left LE lymphedema, and a history of surgery for vascular occlusion in her left thigh. Her fluid intake is unremarkable. She denies symptoms of leaking with activity or cough, or straining to empty. In addition, she has had pelvic prolapse that is occasionally symptomatic. She has had no uterine or pelvic surgery. She did see several providers in the past for the incontinence and prolapse but has not be completely satisfied with the outcomes. Of note, she has a long history of recurrent UTIs, including 3 confirmed with culture in the past year. She had a normal cystoscopy a few years ago.    Health maintenance screening:  Colonoscopy polyps resected in 2016  Mammogram normal in 2018    Past Medical History:   Diagnosis Date     * * * SBE PROPHYLAXIS * * * 1998    Amox 500mg, take 4 tabs one hour prior to procedure.Takes this because of lymphedema secondary from leg surgey     Central serous retinopathy 2001    Resolved 9/2001     CHRONIC NECK PAIN 1995     Depressive disorder      Depressive disorder, not elsewhere classified 2001     MIXED HYPERLIPIDEMIA, LDL GOAL <160 1998    LDL goal < 160     Motion sickness       MYXOID LIPOSARCOMA 2000    Left thigh, S/P excision, radiation  at [facility]     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    CHRONIC LEFT THIGH LYMPHEDEMA     Nontoxic multinodular goiter 2005    needs yearly US     Osteoporosis, unspecified 2001     Other lymphedema 2000    left thigh, gets regular PT for this     PAD (peripheral artery disease) (H) 4/20/2018     PONV (postoperative nausea and vomiting)      SHINGLES 2001     Sprain of lumbosacral (joint) (ligament) 1995    right     Unspecified hearing loss 1998    chronic tinnitus     Unspecified tinnitus 1998       Past Surgical History:   Procedure Laterality Date     BYPASS GRAFT FEMOROPOPLITEAL Left 1/21/2019    Procedure: LEFT FEMORAL TO ABOVE KNEE POPLITEAL  BYPASS WITH POLYTETRAFLUOROETHYLENE GRAFT;  Surgeon: Shade Owens MD;  Location:  OR     C APPENDECTOMY      Appendectomy     C NONSPECIFIC PROCEDURE  04/2000    Open Biopsy Left Thigh Liposarcoma     COLONOSCOPY N/A 2/5/2016    Procedure: COMBINED COLONOSCOPY, SINGLE OR MULTIPLE BIOPSY/POLYPECTOMY BY BIOPSY;  Surgeon: Varun Stanley MD, MD;  Location:  GI     CYSTOSCOPY       EXCISION MALIG LESION>1.25CM  5/2000    Myxoid Liposarcoma       EXPLORE GROIN Right 5/1/2018    Procedure: EXPLORE GROIN;  EMERGENCY RIGHT FEMORAL EXPLORATION WITH FEMORAL ARTERY REPAIR.    EBL: 50mL;  Surgeon: Shade Owens MD;  Location:  OR      COLONOSCOPY THRU STOMA, DIAGNOSTIC  2006 due 2010     HC COLP CERVIX/UPPER VAGINA  07/1997    Negative     HC DILATION/CURETTAGE DIAG/THER NON OB  02/1997    Post menopausal bleeding on HRT, negative     HIP SURGERY Left 04/13/2019     IR ANGIOGRAM THROUGH CATHETER FOLLOW UP  12/20/2018     IR ANGIOGRAM THROUGH CATHETER FOLLOW UP  12/21/2018     IR LOWER EXTREMITY ANGIOGRAM LEFT  12/19/2018     VASCULAR SURGERY  04/30/2018    Left SFA stent in bypass graft       Social History     Socioeconomic History     Marital status:      Spouse name: Chris     Number of  children: 3     Years of education: 14     Highest education level: Not on file   Occupational History     Occupation: at home     Employer: NONE    Social Needs     Financial resource strain: Not on file     Food insecurity:     Worry: Not on file     Inability: Not on file     Transportation needs:     Medical: Not on file     Non-medical: Not on file   Tobacco Use     Smoking status: Never Smoker     Smokeless tobacco: Never Used   Substance and Sexual Activity     Alcohol use: No     Drug use: No     Sexual activity: Yes     Partners: Male     Birth control/protection: Post-menopausal   Lifestyle     Physical activity:     Days per week: Not on file     Minutes per session: Not on file     Stress: Not on file   Relationships     Social connections:     Talks on phone: Not on file     Gets together: Not on file     Attends Anabaptism service: Not on file     Active member of club or organization: Not on file     Attends meetings of clubs or organizations: Not on file     Relationship status: Not on file     Intimate partner violence:     Fear of current or ex partner: Not on file     Emotionally abused: Not on file     Physically abused: Not on file     Forced sexual activity: Not on file   Other Topics Concern     Parent/sibling w/ CABG, MI or angioplasty before 65F 55M? No   Social History Narrative     Not on file       Family History   Problem Relation Age of Onset     C.A.D. Father         MI 57     Alcohol/Drug Father         etoh     Obesity Mother      Osteoporosis Mother      Colon Cancer Brother 70     Hyperlipidemia Son      Hyperlipidemia Son         very high, experimental drug     C.A.D. Paternal Grandmother         ascvd     Diabetes Maternal Grandmother      Cancer Maternal Grandmother      C.A.D. Paternal Uncle         Mi  age 48     Cancer Maternal Aunt         pancreatic [place]       Review of Systems     Constitutional:  Negative for fever, chills, weight loss, weight gain, fatigue,  decreased appetite, night sweats, recent stressors, height gain, height loss, post-operative complications, incisional pain, hallucinations, increased energy, hyperactivity and confused.   HENT:  Negative for ear pain, hearing loss, tinnitus, nosebleeds, trouble swallowing, hoarse voice, mouth sores, sore throat, ear discharge, tooth pain, gum tenderness, taste disturbance, smell disturbance, hearing aid, bleeding gums, dry mouth, sinus pain, sinus congestion and neck mass.    Eyes:  Negative for double vision, pain, redness, eye pain, decreased vision, eye watering, eye bulging, eye dryness, flashing lights, spots, floaters, strabismus, tunnel vision, jaundice and eye irritation.   Respiratory:   Negative for cough, hemoptysis, sputum production, shortness of breath, wheezing, sleep disturbances due to breathing, snores loudly, respiratory pain, dyspnea on exertion, cough disturbing sleep and postural dyspnea.    Cardiovascular:  Positive for edema. Negative for chest pain, dyspnea on exertion, palpitations, orthopnea, claudication, leg swelling, fingers/toes turn blue, hypertension, hypotension, syncope, history of heart murmur, chest pain on exertion, chest pain at rest, pacemaker, few scattered varicosities, leg pain, sleep disturbances due to breathing, tachycardia, light-headedness and exercise intolerance.   Gastrointestinal:  Negative for heartburn, nausea, vomiting, abdominal pain, diarrhea, constipation, blood in stool, melena, rectal pain, bloating, hemorrhoids, bowel incontinence, jaundice, rectal bleeding, coffee ground emesis and change in stool.   Genitourinary:  Positive for bladder incontinence, decreased libido and arousal difficulty. Negative for dysuria, urgency, hematuria, flank pain, vaginal discharge, difficulty urinating, genital sores, dyspareunia, nocturia, voiding less frequently, abnormal vaginal bleeding, excessive menstruation, menstrual changes, hot flashes, vaginal dryness and  postmenopausal bleeding.   Musculoskeletal:  Negative for myalgias, back pain, joint swelling, arthralgias, stiffness, muscle cramps, neck pain, bone pain, muscle weakness and fracture.   Skin:  Negative for nail changes, itching, poor wound healing, rash, hair changes, skin changes, acne, warts, poor wound healing, scarring, flaky skin, Raynaud's phenomenon, sensitivity to sunlight and skin thickening.   Neurological:  Negative for dizziness, tingling, tremors, speech change, seizures, loss of consciousness, weakness, light-headedness, numbness, headaches, disturbances in coordination, extremity numbness, memory loss, difficulty walking and paralysis.   Endo/Heme:  Positive for bruises/bleeds easily. Negative for anemia and swollen glands.   Psychiatric/Behavioral:  Negative for depression, hallucinations, memory loss, decreased concentration, mood swings and panic attacks.    Breast:  Negative for breast discharge, breast mass, breast pain and nipple retraction.   Endocrine:  Negative for altered temperature regulation, polyphagia, polydipsia, unwanted hair growth and change in facial hair.      Allergies   Allergen Reactions     Celexa [Citalopram Hydrobromide]      Decreased libido       Current Outpatient Medications   Medication     Acetaminophen 325 MG CAPS     alendronate (FOSAMAX) 70 MG tablet     aspirin (ASA) 81 MG chewable tablet     CALCIUM PO     clopidogrel (PLAVIX) 75 MG tablet     ezetimibe (ZETIA) 10 MG tablet     ezetimibe (ZETIA) 10 MG tablet     multivitamin, therapeutic (THERA-VIT) TABS tablet     oxybutynin ER (DITROPAN-XL) 5 MG 24 hr tablet     rosuvastatin (CRESTOR) 40 MG tablet     venlafaxine (EFFEXOR-XR) 37.5 MG 24 hr capsule     meclizine (ANTIVERT) 25 MG tablet     ondansetron (ZOFRAN ODT) 4 MG ODT tab     order for DME     order for DME     ORDER FOR DME     Current Facility-Administered Medications   Medication     sodium chloride (PF) 0.9% PF flush 10 mL       /72   Pulse 75   " Ht 1.676 m (5' 6\")   Wt 67.1 kg (148 lb)   LMP 03/18/2005   BMI 23.89 kg/m   Patient's last menstrual period was 03/18/2005. Body mass index is 23.89 kg/m .  She is alert and oriented.  She is well groomed, comfortable in no acute distress.  Eyes have anicteric sclerae, moist conjunctivae.  Normal mood and affect.   Normocephalic, atraumatic without masses, lesions, obvious abnormalities. Non-labored breathing. Skin dry, warm to touch. Left LE edema and limited ROM in left leg, the patient uses a cane. Otherwise ROM is normal.    Urine dip trace blood    PVR 20 mL by bladder scan    A/P: Angeli Jacobson is a 69 year old F with OAB symptoms, urge incontinence, postural incontinence, and recurrent UTIs. Given her persistent symptoms and UTIs we discussed getting a renal U/S and cystoscopy to rule out structural causes of her symptoms, and sending her urine sample today for culture as well as cytology to rule out cancer. Additionally, we discussed PT to help with her symptoms. The patient was agreeable to this plan.    Carmen Castillo MS4 served as scribe for Jennifer Oquendo MD during this encounter.    I, Jennifer Oquendo, agree with above History, Review of Systems, Physical exam and Plan.  I have reviewed the content of the documentation and have edited it as needed. I have personally performed the services documented here and the documentation accurately represents those services and the decisions I have made.      Given long history of UTIs will evaluate for stones or intravescial etiology of infections/bladder symptoms.  RTC for cystoscopy in the near future    30 minutes were spent with the patient today, > 50% in counseling and coordination of care    Jennifer Oquendo MD MPH   of Urology    CC  Patient Care Team:  Johana Isabel PA-C as PCP - General (Physician Assistant)  Johana Isabel PA-C as Assigned PCP  JOHANA ISABEL                  "

## 2019-07-10 NOTE — NURSING NOTE
"Chief Complaint   Patient presents with     Incontinence     pt is here due to mixed incont.       Patient Active Problem List   Diagnosis     Pain in joint, multiple sites     MULTINODUL GOITER     Hearing loss     Hyperlipidemia LDL goal <70     Osteoporosis     Cervicalgia     Major depressive disorder, recurrent episode, moderate (H)     Impaired fasting glucose     Lymphedema of left leg     Tubular adenoma     Other constipation     Vertigo     Myxoid liposarcoma (HCC)     PAD (peripheral artery disease) (H)     Vascular graft occlusion (H)     Femoral-popliteal bypass graft occlusion, left (H)     History of sarcoma     S/P ORIF (open reduction internal fixation) fracture     Hip pain, left     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)       Allergies   Allergen Reactions     Celexa [Citalopram Hydrobromide]      Decreased libido       /72   Pulse 75   Ht 1.676 m (5' 6\")   Wt 67.1 kg (148 lb)   LMP 03/18/2005   BMI 23.89 kg/m      PVR: 20 ml was found in the pt bladder by ultrasound.    Patient was informed, and cleaned after the Ultrasound        Som Diez, EMT-B  7/10/2019         "

## 2019-07-10 NOTE — LETTER
7/10/2019       RE: Angeli Jacobson  [address] [facility] [address]     Dear Colleague,    Thank you for referring your patient, Angeli Jacobson, to the [facility] at [facility]. Please see a copy of my visit note below.    July 10, 2019    Referring Provider: Sepideh Burris PA-C  [address] [place] [address]    Primary Care Provider: Sepideh Burris    CC: incontinence and pelvic prolapse    HPI:  Angeli Jacobson is a 69 year old female who presents for evaluation of her pelvic floor symptoms.  She has urgency incontinence throughout the day with occasional insensate incontinence, and night time urgency/incontinence 4x per night. She wears a pad every day, and can manage her urgency symptoms if she schedules herself to toilet every two hours. She is concerning that things were worsening in the past 1.5 years, which did not improve with trial of a pessary. She started on oxybutynin two months ago which has decreased her night urgency. She has had a recent left hip fracture with repair that resulted in left LE lymphedema, and a history of surgery for vascular occlusion in her left thigh. Her fluid intake is unremarkable. She denies symptoms of leaking with activity or cough, or straining to empty. In addition, she has had pelvic prolapse that is occasionally symptomatic. She has had no uterine or pelvic surgery. She did see several providers in the past for the incontinence and prolapse but has not be completely satisfied with the outcomes. Of note, she has a long history of recurrent UTIs, including 3 confirmed with culture in the past year. She had a normal cystoscopy a few years ago.    Health maintenance screening:  Colonoscopy polyps resected in 2016  Mammogram normal in 2018    Past Medical History:   Diagnosis Date     * * * SBE PROPHYLAXIS * * * 1998    Amox 500mg, take 4 tabs one hour  prior to procedure.Takes this because of lymphedema secondary from leg surgey     Central serous retinopathy 2001    Resolved 9/2001     CHRONIC NECK PAIN 1995     Depressive disorder      Depressive disorder, not elsewhere classified 2001     MIXED HYPERLIPIDEMIA, LDL GOAL <160 1998    LDL goal < 160     Motion sickness      MYXOID LIPOSARCOMA 2000    Left thigh, S/P excision, radiation  at [facility]     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    CHRONIC LEFT THIGH LYMPHEDEMA     Nontoxic multinodular goiter 2005    needs yearly US     Osteoporosis, unspecified 2001     Other lymphedema 2000    left thigh, gets regular PT for this     PAD (peripheral artery disease) (H) 4/20/2018     PONV (postoperative nausea and vomiting)      SHINGLES 2001     Sprain of lumbosacral (joint) (ligament) 1995    right     Unspecified hearing loss 1998    chronic tinnitus     Unspecified tinnitus 1998       Past Surgical History:   Procedure Laterality Date     BYPASS GRAFT FEMOROPOPLITEAL Left 1/21/2019    Procedure: LEFT FEMORAL TO ABOVE KNEE POPLITEAL  BYPASS WITH POLYTETRAFLUOROETHYLENE GRAFT;  Surgeon: Shade Owens MD;  Location:  OR     C APPENDECTOMY      Appendectomy     C NONSPECIFIC PROCEDURE  04/2000    Open Biopsy Left Thigh Liposarcoma     COLONOSCOPY N/A 2/5/2016    Procedure: COMBINED COLONOSCOPY, SINGLE OR MULTIPLE BIOPSY/POLYPECTOMY BY BIOPSY;  Surgeon: Varun Stanley MD, MD;  Location:  GI     CYSTOSCOPY       EXCISION MALIG LESION>1.25CM  5/2000    Myxoid Liposarcoma       EXPLORE GROIN Right 5/1/2018    Procedure: EXPLORE GROIN;  EMERGENCY RIGHT FEMORAL EXPLORATION WITH FEMORAL ARTERY REPAIR.    EBL: 50mL;  Surgeon: Shade Owens MD;  Location:  OR      COLONOSCOPY THRU STOMA, DIAGNOSTIC  2006    due 2010     HC COLP CERVIX/UPPER VAGINA  07/1997    Negative     HC DILATION/CURETTAGE DIAG/THER NON OB  02/1997    Post menopausal bleeding on HRT, negative     HIP SURGERY Left 04/13/2019     IR ANGIOGRAM  THROUGH CATHETER FOLLOW UP  12/20/2018     IR ANGIOGRAM THROUGH CATHETER FOLLOW UP  12/21/2018     IR LOWER EXTREMITY ANGIOGRAM LEFT  12/19/2018     VASCULAR SURGERY  04/30/2018    Left SFA stent in bypass graft       Social History     Socioeconomic History     Marital status:      Spouse name: Chris     Number of children: 3     Years of education: 14     Highest education level: Not on file   Occupational History     Occupation: at home     Employer: NONE    Social Needs     Financial resource strain: Not on file     Food insecurity:     Worry: Not on file     Inability: Not on file     Transportation needs:     Medical: Not on file     Non-medical: Not on file   Tobacco Use     Smoking status: Never Smoker     Smokeless tobacco: Never Used   Substance and Sexual Activity     Alcohol use: No     Drug use: No     Sexual activity: Yes     Partners: Male     Birth control/protection: Post-menopausal   Lifestyle     Physical activity:     Days per week: Not on file     Minutes per session: Not on file     Stress: Not on file   Relationships     Social connections:     Talks on phone: Not on file     Gets together: Not on file     Attends [employer] service: Not on file     Active member of club or organization: Not on file     Attends meetings of clubs or organizations: Not on file     Relationship status: Not on file     Intimate partner violence:     Fear of current or ex partner: Not on file     Emotionally abused: Not on file     Physically abused: Not on file     Forced sexual activity: Not on file   Other Topics Concern     Parent/sibling w/ CABG, MI or angioplasty before 65F 55M? No   Social History Narrative     Not on file       Family History   Problem Relation Age of Onset     C.A.D. Father         MI 57     Alcohol/Drug Father         etoh     Obesity Mother      Osteoporosis Mother      Colon Cancer Brother 70     Hyperlipidemia Son      Hyperlipidemia Son         very high, experimental drug      DANIELA. Paternal Grandmother         ascvd     Diabetes Maternal Grandmother      Cancer Maternal Grandmother      DANIELA. Paternal Uncle         Mi  age 48     Cancer Maternal Aunt         pancreatic CA       Review of Systems     Constitutional:  Negative for fever, chills, weight loss, weight gain, fatigue, decreased appetite, night sweats, recent stressors, height gain, height loss, post-operative complications, incisional pain, hallucinations, increased energy, hyperactivity and confused.   HENT:  Negative for ear pain, hearing loss, tinnitus, nosebleeds, trouble swallowing, hoarse voice, mouth sores, sore throat, ear discharge, tooth pain, gum tenderness, taste disturbance, smell disturbance, hearing aid, bleeding gums, dry mouth, sinus pain, sinus congestion and neck mass.    Eyes:  Negative for double vision, pain, redness, eye pain, decreased vision, eye watering, eye bulging, eye dryness, flashing lights, spots, floaters, strabismus, tunnel vision, jaundice and eye irritation.   Respiratory:   Negative for cough, hemoptysis, sputum production, shortness of breath, wheezing, sleep disturbances due to breathing, snores loudly, respiratory pain, dyspnea on exertion, cough disturbing sleep and postural dyspnea.    Cardiovascular:  Positive for edema. Negative for chest pain, dyspnea on exertion, palpitations, orthopnea, claudication, leg swelling, fingers/toes turn blue, hypertension, hypotension, syncope, history of heart murmur, chest pain on exertion, chest pain at rest, pacemaker, few scattered varicosities, leg pain, sleep disturbances due to breathing, tachycardia, light-headedness and exercise intolerance.   Gastrointestinal:  Negative for heartburn, nausea, vomiting, abdominal pain, diarrhea, constipation, blood in stool, melena, rectal pain, bloating, hemorrhoids, bowel incontinence, jaundice, rectal bleeding, coffee ground emesis and change in stool.   Genitourinary:  Positive for bladder  incontinence, decreased libido and arousal difficulty. Negative for dysuria, urgency, hematuria, flank pain, vaginal discharge, difficulty urinating, genital sores, dyspareunia, nocturia, voiding less frequently, abnormal vaginal bleeding, excessive menstruation, menstrual changes, hot flashes, vaginal dryness and postmenopausal bleeding.   Musculoskeletal:  Negative for myalgias, back pain, joint swelling, arthralgias, stiffness, muscle cramps, neck pain, bone pain, muscle weakness and fracture.   Skin:  Negative for nail changes, itching, poor wound healing, rash, hair changes, skin changes, acne, warts, poor wound healing, scarring, flaky skin, Raynaud's phenomenon, sensitivity to sunlight and skin thickening.   Neurological:  Negative for dizziness, tingling, tremors, speech change, seizures, loss of consciousness, weakness, light-headedness, numbness, headaches, disturbances in coordination, extremity numbness, memory loss, difficulty walking and paralysis.   Endo/Heme:  Positive for bruises/bleeds easily. Negative for anemia and swollen glands.   Psychiatric/Behavioral:  Negative for depression, hallucinations, memory loss, decreased concentration, mood swings and panic attacks.    Breast:  Negative for breast discharge, breast mass, breast pain and nipple retraction.   Endocrine:  Negative for altered temperature regulation, polyphagia, polydipsia, unwanted hair growth and change in facial hair.      Allergies   Allergen Reactions     Celexa [Citalopram Hydrobromide]      Decreased libido       Current Outpatient Medications   Medication     Acetaminophen 325 MG CAPS     alendronate (FOSAMAX) 70 MG tablet     aspirin (ASA) 81 MG chewable tablet     CALCIUM PO     clopidogrel (PLAVIX) 75 MG tablet     ezetimibe (ZETIA) 10 MG tablet     ezetimibe (ZETIA) 10 MG tablet     multivitamin, therapeutic (THERA-VIT) TABS tablet     oxybutynin ER (DITROPAN-XL) 5 MG 24 hr tablet     rosuvastatin (CRESTOR) 40 MG tablet  "    venlafaxine (EFFEXOR-XR) 37.5 MG 24 hr capsule     meclizine (ANTIVERT) 25 MG tablet     ondansetron (ZOFRAN ODT) 4 MG ODT tab     order for DME     order for DME     ORDER FOR DME     Current Facility-Administered Medications   Medication     sodium chloride (PF) 0.9% PF flush 10 mL       /72   Pulse 75   Ht 1.676 m (5' 6\")   Wt 67.1 kg (148 lb)   LMP 03/18/2005   BMI 23.89 kg/m    Patient's last menstrual period was 03/18/2005. Body mass index is 23.89 kg/m .  She is alert and oriented.  She is well groomed, comfortable in no acute distress.  Eyes have anicteric sclerae, moist conjunctivae.  Normal mood and affect.   Normocephalic, atraumatic without masses, lesions, obvious abnormalities. Non-labored breathing. Skin dry, warm to touch. Left LE edema and limited ROM in left leg, the patient uses a cane. Otherwise ROM is normal.    Urine dip trace blood    PVR 20 mL by bladder scan    A/P: Angeli PARTH Jacobson is a 69 year old F with OAB symptoms, urge incontinence, postural incontinence, and recurrent UTIs. Given her persistent symptoms and UTIs we discussed getting a renal U/S and cystoscopy to rule out structural causes of her symptoms, and sending her urine sample today for culture as well as cytology to rule out cancer. Additionally, we discussed PT to help with her symptoms. The patient was agreeable to this plan.    Carmen Castillo MS4 served as scribe for Jennifer Oquendo MD during this encounter.    I, Jennifer Oquendo, agree with above History, Review of Systems, Physical exam and Plan.  I have reviewed the content of the documentation and have edited it as needed. I have personally performed the services documented here and the documentation accurately represents those services and the decisions I have made.      Given long history of UTIs will evaluate for stones or intravescial etiology of infections/bladder symptoms.  RTC for cystoscopy in the near future    30 minutes were spent with the patient " today, > 50% in counseling and coordination of care    Jennifer Oquendo MD MPH   of Urology    CC  Patient Care Team:  Sepideh Burris PA-C as PCP - General (Physician Assistant)

## 2019-07-10 NOTE — PATIENT INSTRUCTIONS
Please do the renal ultrasound    Please return for a cystoscopy (procedure to look in the bladder) and pelvic exam    It was a pleasure meeting with you today.  Thank you for allowing me and my team the privilege of caring for you today.  YOU are the reason we are here, and I truly hope we provided you with the excellent service you deserve.  Please let us know if there is anything else we can do for you so that we can be sure you are leaving completely satisfied with your care experience.    Cystoscopy    Cystoscopy is a procedure that lets your doctor look directly inside your urethra and bladder. It can be used to:    Help diagnose a problem with your urethra, bladder, or kidneys.    Take a sample (biopsy) of bladder or urethral tissue.    Treat certain problems (such as removing kidney stones).    Place a stent to bypass an obstruction.    Take special X-rays of the kidneys.  Based on the findings, your doctor may recommend other tests or treatments.  What is a cystoscope?  A cystoscope is a telescope-like instrument that contains lenses and fiberoptics (small glass wires that make bright light). The cystoscope may be straight and rigid, or flexible to bend around curves in the urethra. The doctor may look directly into the cystoscope, or project the image onto a monitor.  Getting ready    Ask your doctor if you should stop taking any medicines before the procedure.    Follow any other instructions your doctor gives you.  Tell your doctor before the exam if you:    Take any medicines, such as aspirin or blood thinners    Have allergies to any medicines    Are pregnant   The procedure  Cystoscopy is done in the doctor s office, surgery center, or hospital. The doctor and a nurse are present during the procedure. It takes only a few minutes, longer if a biopsy, X-ray, or treatment needs to be done.  During the procedure:    You lie on an exam table on your back, knees bent and legs apart. You are covered with a  drape.    Your urethra and the area around it are washed. Anesthetic jelly may be applied to numb the urethra.    The cystoscope is inserted. A sterile fluid is put into the bladder to expand it. You may feel pressure from this fluid.    When the procedure is done, the cystoscope is removed.  After the procedure   Once you re home:    Drink plenty of fluids.    You may have burning or light bleeding when you urinate--this is normal.    Medicines may be prescribed to ease any discomfort or prevent infection. Take these as directed.    Call your doctor if you have heavy bleeding or blood clots, burning that lasts more than a day, a fever over 100 F  (38  C), or trouble urinating.  Date Last Reviewed: 1/1/2017 2000-2017 [employer]. [address] [place] [address], [place] [address]. All rights reserved. This information is not intended as a substitute for professional medical care. Always follow your healthcare professional's instructions.

## 2019-07-11 ENCOUNTER — MYC MEDICAL ADVICE (OUTPATIENT)
Dept: FAMILY MEDICINE | Facility: CLINIC | Age: 69
End: 2019-07-11

## 2019-07-11 ENCOUNTER — MYC REFILL (OUTPATIENT)
Dept: FAMILY MEDICINE | Facility: CLINIC | Age: 69
End: 2019-07-11

## 2019-07-11 DIAGNOSIS — E78.5 HYPERLIPIDEMIA LDL GOAL <70: ICD-10-CM

## 2019-07-11 DIAGNOSIS — Z85.831 HISTORY OF SARCOMA: ICD-10-CM

## 2019-07-11 DIAGNOSIS — I73.9 PAD (PERIPHERAL ARTERY DISEASE) (H): ICD-10-CM

## 2019-07-11 LAB
BACTERIA SPEC CULT: NORMAL
Lab: NORMAL
SPECIMEN SOURCE: NORMAL

## 2019-07-11 RX ORDER — ROSUVASTATIN CALCIUM 40 MG/1
40 TABLET, COATED ORAL EVERY EVENING
Qty: 90 TABLET | Refills: 3 | Status: SHIPPED | OUTPATIENT
Start: 2019-07-11 | End: 2019-07-11

## 2019-07-11 RX ORDER — ROSUVASTATIN CALCIUM 40 MG/1
40 TABLET, COATED ORAL EVERY EVENING
Qty: 30 TABLET | Refills: 0 | Status: SHIPPED | OUTPATIENT
Start: 2019-07-11 | End: 2019-08-13

## 2019-07-11 NOTE — TELEPHONE ENCOUNTER
"Last Written Prescription Date:  1/31/19  Last Fill Quantity: 90,  # refills: 3   Last office visit: 6/14/2019 with prescribing provider:  Sepideh Burris   Future Office Visit:      Requested Prescriptions   Pending Prescriptions Disp Refills     rosuvastatin (CRESTOR) 40 MG tablet 90 tablet 3     Sig: Take 1 tablet (40 mg) by mouth every evening       Statins Protocol Passed - 7/11/2019 10:20 AM        Passed - LDL on file in past 12 months     Recent Labs   Lab Test 04/30/19  1243   *             Passed - No abnormal creatine kinase in past 12 months     No lab results found.             Passed - Recent (12 mo) or future (30 days) visit within the authorizing provider's specialty     Patient had office visit in the last 12 months or has a visit in the next 30 days with authorizing provider or within the authorizing provider's specialty.  See \"Patient Info\" tab in inbasket, or \"Choose Columns\" in Meds & Orders section of the refill encounter.              Passed - Medication is active on med list        Passed - Patient is age 18 or older        Passed - No active pregnancy on record        Passed - No positive pregnancy test in past 12 months        Prescription approved per [facility] Refill Protocol.    Divine Sears RN on 7/11/2019 at 10:39 AM    "

## 2019-07-12 ENCOUNTER — THERAPY VISIT (OUTPATIENT)
Dept: PHYSICAL THERAPY | Facility: CLINIC | Age: 69
End: 2019-07-12
Payer: COMMERCIAL

## 2019-07-12 DIAGNOSIS — Z87.81 S/P ORIF (OPEN REDUCTION INTERNAL FIXATION) FRACTURE: ICD-10-CM

## 2019-07-12 DIAGNOSIS — M25.552 HIP PAIN, LEFT: ICD-10-CM

## 2019-07-12 DIAGNOSIS — Z98.890 S/P ORIF (OPEN REDUCTION INTERNAL FIXATION) FRACTURE: ICD-10-CM

## 2019-07-12 LAB — COPATH REPORT: NORMAL

## 2019-07-12 PROCEDURE — 97112 NEUROMUSCULAR REEDUCATION: CPT | Mod: GP | Performed by: PHYSICAL THERAPIST

## 2019-07-12 PROCEDURE — 97530 THERAPEUTIC ACTIVITIES: CPT | Mod: GP | Performed by: PHYSICAL THERAPIST

## 2019-07-12 PROCEDURE — 97110 THERAPEUTIC EXERCISES: CPT | Mod: GP | Performed by: PHYSICAL THERAPIST

## 2019-07-17 PROBLEM — N32.81 OAB (OVERACTIVE BLADDER): Status: ACTIVE | Noted: 2019-07-17

## 2019-07-17 PROBLEM — Z87.440 PERSONAL HISTORY OF URINARY TRACT INFECTION: Status: ACTIVE | Noted: 2019-07-17

## 2019-07-17 PROBLEM — N39.41 URGE INCONTINENCE OF URINE: Status: ACTIVE | Noted: 2019-07-17

## 2019-07-17 PROBLEM — N39.492 POSTURAL URINARY INCONTINENCE: Status: ACTIVE | Noted: 2019-07-17

## 2019-07-19 ENCOUNTER — TELEPHONE (OUTPATIENT)
Dept: UROLOGY | Facility: CLINIC | Age: 69
End: 2019-07-19

## 2019-07-19 ENCOUNTER — HOSPITAL ENCOUNTER (OUTPATIENT)
Dept: OCCUPATIONAL THERAPY | Facility: CLINIC | Age: 69
Setting detail: THERAPIES SERIES
End: 2019-07-19
Attending: ORTHOPAEDIC SURGERY
Payer: COMMERCIAL

## 2019-07-19 ENCOUNTER — THERAPY VISIT (OUTPATIENT)
Dept: PHYSICAL THERAPY | Facility: CLINIC | Age: 69
End: 2019-07-19
Payer: COMMERCIAL

## 2019-07-19 DIAGNOSIS — Z87.81 S/P ORIF (OPEN REDUCTION INTERNAL FIXATION) FRACTURE: ICD-10-CM

## 2019-07-19 DIAGNOSIS — Z98.890 S/P ORIF (OPEN REDUCTION INTERNAL FIXATION) FRACTURE: ICD-10-CM

## 2019-07-19 DIAGNOSIS — M25.552 HIP PAIN, LEFT: ICD-10-CM

## 2019-07-19 PROCEDURE — 97140 MANUAL THERAPY 1/> REGIONS: CPT | Mod: GO | Performed by: OCCUPATIONAL THERAPIST

## 2019-07-19 PROCEDURE — 97110 THERAPEUTIC EXERCISES: CPT | Mod: GP | Performed by: PHYSICAL THERAPIST

## 2019-07-19 PROCEDURE — 97112 NEUROMUSCULAR REEDUCATION: CPT | Mod: GP | Performed by: PHYSICAL THERAPIST

## 2019-07-19 PROCEDURE — 97530 THERAPEUTIC ACTIVITIES: CPT | Mod: GP | Performed by: PHYSICAL THERAPIST

## 2019-07-19 NOTE — TELEPHONE ENCOUNTER
Called Angeil with the results as below. She expressed understanding. Cystoscopy scheduled for 9/11/2019.    ----- Message from Jennifer Oquendo MD sent at 7/17/2019  4:14 PM CDT -----  Ms Jacobson the urine tests are all negative.  Please plan to follow up with the cystoscopy when able    Best    YOON Oquendo MD

## 2019-07-19 NOTE — PROGRESS NOTES
Outpatient Occupational Therapy Discharge Note     Patient: Angeli Jacobson  : 1950    Beginning/End Dates of Reporting Period:  19 to 2019    Referring Provider: Dr. Spencer Celeste    Therapy Diagnosis: Lymphedema    Client Self Report:   Pt reports doing well with home program.    Objective Measurements:    Pt best measurement was 6% reduction of LLE.  She was L>R 22% SOC.  Outcome Measures (most recent score):  Lymphedema Life Impact Scale (score range 0-72). A higher score indicates greater impairment.: 28    Goals:   Goal Identifier 1   Goal Description Pt will be independent with modifications to home program to contain her lymphedema at its previous size.   Target Date 19   Date Met  19   Progress:goal met     Goal Identifier 2   Goal Description Pt will be independent for a plan for travel to [place] with lymphedema program.   Target Date 19   Date Met  19   Progress:goal met     Goal Identifier 3   Goal Description Pt will reduce the leg 50% of swelling to resume her home program and reduce the risk for infection.   Target Date 19   Date Met      Progress:goal not met     Goal Identifier 4   Goal Description Pt will be independent with flexitouch as part of her home program.   Target Date 19   Date Met  19   Progress:goal met   Progress Toward Goals:   Progress this reporting period: Pt was seen for 11 session since SOC.  She did not have a noticeable reduction of the LLE despite being very compliant with home program and doing a 2 week intensive therapy. She returned to her MD and they did say the surgeries she has had could complicate this, and the increase was expected around the knee.  Possibly after she has had time to heal and the weather is cooler, maybe then the MLD/intensive therapy would be more successful.  Also the flexitouch may help with this as well.      Plan:  Discharge from therapy.    Discharge:    Reason for Discharge:  Patient has met all goals, except 3.      Equipment Issued: garments, GCB    Discharge Plan: Patient to continue home program.

## 2019-07-19 NOTE — PROGRESS NOTES
[facility]      OUTPATIENT OCCUPATIONAL THERAPY  PLAN OF TREATMENT FOR OUTPATIENT REHABILITATION    Patient's Last Name, First Name, M.I.                YOB: 1950  Angeli Jacobson                        Provider's Name  [facility] Medical Record No.  [id number]                               Onset Date: 5/7/18   Start of Care Date: 5/7/19   Type:     ___PT   _X_OT   ___SLP Medical Diagnosis: Lymphedema                       OT Diagnosis: Lymphedema      _________________________________________________________________________________  Plan of Treatment:    Frequency/Duration: 0x/week for 1 week, then once a week for one week.     Goals:  Goal Identifier 1   Goal Description Pt will be independent with modifications to home program to contain her lymphedema at its previous size.   Target Date 07/12/19   Date Met  07/19/19   Progress:goal met     Goal Identifier 2   Goal Description Pt will be independent for a plan for travel to [place] with lymphedema program.   Target Date 05/07/19   Date Met  05/07/19   Progress:goal met     Goal Identifier 3   Goal Description Pt will reduce the leg 50% of swelling to resume her home program and reduce the risk for infection.   Target Date 07/12/19   Date Met      Progress:goal in progress     Goal Identifier 4   Goal Description Pt will be independent with flexitouch as part of her home program.   Target Date 07/19/19   Date Met  07/19/19   Progress:goal met       Progress Toward Goals:   Progress this reporting period: Pt was seen for intensive and the follow up fell out of initial cert dates.  Will extend cert and write progress,D/C note today 7/19.      Certification date from 7/13/19 to 7/21/19.    Amy Guzman OT          I CERTIFY THE NEED FOR THESE SERVICES FURNISHED UNDER        THIS PLAN OF TREATMENT AND WHILE UNDER MY CARE  Jin             Physician Signature               Date    X_____________________________________________________                      Referring Provider: Dr. Spencer Celeste

## 2019-07-23 ENCOUNTER — MEDICAL CORRESPONDENCE (OUTPATIENT)
Dept: HEALTH INFORMATION MANAGEMENT | Facility: CLINIC | Age: 69
End: 2019-07-23

## 2019-07-23 ENCOUNTER — TELEPHONE (OUTPATIENT)
Dept: FAMILY MEDICINE | Facility: CLINIC | Age: 69
End: 2019-07-23

## 2019-07-23 NOTE — TELEPHONE ENCOUNTER
Received 4 page fax for Prescription for  Pneumatic Compression Device Flexitouch System for Sepideh Burris to complete. Form in the in-basket at 's desk.    Mary Weaver/ANGELICA

## 2019-07-26 ENCOUNTER — THERAPY VISIT (OUTPATIENT)
Dept: PHYSICAL THERAPY | Facility: CLINIC | Age: 69
End: 2019-07-26
Payer: COMMERCIAL

## 2019-07-26 DIAGNOSIS — M25.552 HIP PAIN, LEFT: ICD-10-CM

## 2019-07-26 DIAGNOSIS — Z87.81 S/P ORIF (OPEN REDUCTION INTERNAL FIXATION) FRACTURE: ICD-10-CM

## 2019-07-26 DIAGNOSIS — Z98.890 S/P ORIF (OPEN REDUCTION INTERNAL FIXATION) FRACTURE: ICD-10-CM

## 2019-07-26 PROCEDURE — 97530 THERAPEUTIC ACTIVITIES: CPT | Mod: GP | Performed by: PHYSICAL THERAPIST

## 2019-07-26 PROCEDURE — 97112 NEUROMUSCULAR REEDUCATION: CPT | Mod: GP | Performed by: PHYSICAL THERAPIST

## 2019-07-26 PROCEDURE — 97110 THERAPEUTIC EXERCISES: CPT | Mod: GP | Performed by: PHYSICAL THERAPIST

## 2019-07-30 ENCOUNTER — TELEPHONE (OUTPATIENT)
Dept: OTHER | Facility: CLINIC | Age: 69
End: 2019-07-30

## 2019-07-30 NOTE — TELEPHONE ENCOUNTER
"Pt called to inquire about f/u plan from [facility] 5/8/19.     Per Dr. Owens's 5/8/19   note \" Vascular surgical follow-up will be with me in 6 months for a repeat left leg graft ultrasound and an ADRIAN with exercise.    Orders are in Epic.     I called pt back, left vm explaining f/u plan.     CHERISE McmahonN, RN    "

## 2019-07-31 ENCOUNTER — HOSPITAL ENCOUNTER (OUTPATIENT)
Dept: ULTRASOUND IMAGING | Facility: CLINIC | Age: 69
Discharge: HOME | End: 2019-07-31
Attending: UROLOGY | Admitting: UROLOGY
Payer: COMMERCIAL

## 2019-07-31 DIAGNOSIS — Z87.440 PERSONAL HISTORY OF URINARY TRACT INFECTION: ICD-10-CM

## 2019-07-31 DIAGNOSIS — N39.41 URGE INCONTINENCE OF URINE: ICD-10-CM

## 2019-07-31 PROCEDURE — 76770 US EXAM ABDO BACK WALL COMP: CPT

## 2019-08-02 ENCOUNTER — THERAPY VISIT (OUTPATIENT)
Dept: PHYSICAL THERAPY | Facility: CLINIC | Age: 69
End: 2019-08-02
Payer: COMMERCIAL

## 2019-08-02 DIAGNOSIS — Z98.890 S/P ORIF (OPEN REDUCTION INTERNAL FIXATION) FRACTURE: ICD-10-CM

## 2019-08-02 DIAGNOSIS — Z87.81 S/P ORIF (OPEN REDUCTION INTERNAL FIXATION) FRACTURE: ICD-10-CM

## 2019-08-02 DIAGNOSIS — M25.552 HIP PAIN, LEFT: ICD-10-CM

## 2019-08-02 PROCEDURE — 97112 NEUROMUSCULAR REEDUCATION: CPT | Mod: GP | Performed by: PHYSICAL THERAPIST

## 2019-08-02 PROCEDURE — 97110 THERAPEUTIC EXERCISES: CPT | Mod: GP | Performed by: PHYSICAL THERAPIST

## 2019-08-02 PROCEDURE — 97530 THERAPEUTIC ACTIVITIES: CPT | Mod: GP | Performed by: PHYSICAL THERAPIST

## 2019-08-05 ENCOUNTER — MYC MEDICAL ADVICE (OUTPATIENT)
Dept: FAMILY MEDICINE | Facility: CLINIC | Age: 69
End: 2019-08-05

## 2019-08-07 ENCOUNTER — TELEPHONE (OUTPATIENT)
Dept: FAMILY MEDICINE | Facility: CLINIC | Age: 69
End: 2019-08-07

## 2019-08-07 NOTE — TELEPHONE ENCOUNTER
Patient calling and states was treated by [facility] over the weekend.  States got Macrobid and took that symptoms are coming back.  Advised visit at .  Does not want to go to .  [facility] and was not a pleasant experience and does not want to go back.  States symptoms are not that bad yet and prefers to wait and be seen here tomorrow.  To appt's to check for something tomorrow with same day provider.  Advised [facility] if no openings tomorrow.  Reyna Luciano RN

## 2019-08-08 ENCOUNTER — OFFICE VISIT (OUTPATIENT)
Dept: FAMILY MEDICINE | Facility: CLINIC | Age: 69
End: 2019-08-08
Payer: COMMERCIAL

## 2019-08-08 VITALS
DIASTOLIC BLOOD PRESSURE: 77 MMHG | BODY MASS INDEX: 24.53 KG/M2 | WEIGHT: 152 LBS | RESPIRATION RATE: 12 BRPM | TEMPERATURE: 97.6 F | HEART RATE: 78 BPM | OXYGEN SATURATION: 96 % | SYSTOLIC BLOOD PRESSURE: 118 MMHG

## 2019-08-08 DIAGNOSIS — N30.00 ACUTE CYSTITIS WITHOUT HEMATURIA: Primary | ICD-10-CM

## 2019-08-08 LAB
ALBUMIN UR-MCNC: NEGATIVE MG/DL
APPEARANCE UR: ABNORMAL
BACTERIA #/AREA URNS HPF: ABNORMAL /HPF
BILIRUB UR QL STRIP: NEGATIVE
COLOR UR AUTO: YELLOW
GLUCOSE UR STRIP-MCNC: NEGATIVE MG/DL
HGB UR QL STRIP: ABNORMAL
KETONES UR STRIP-MCNC: NEGATIVE MG/DL
LEUKOCYTE ESTERASE UR QL STRIP: ABNORMAL
NITRATE UR QL: NEGATIVE
NON-SQ EPI CELLS #/AREA URNS LPF: ABNORMAL /LPF
PH UR STRIP: 7 PH (ref 5–7)
RBC #/AREA URNS AUTO: ABNORMAL /HPF
SOURCE: ABNORMAL
SP GR UR STRIP: 1.01 (ref 1–1.03)
UROBILINOGEN UR STRIP-ACNC: 0.2 EU/DL (ref 0.2–1)
WBC #/AREA URNS AUTO: ABNORMAL /HPF

## 2019-08-08 PROCEDURE — 87086 URINE CULTURE/COLONY COUNT: CPT | Performed by: PHYSICIAN ASSISTANT

## 2019-08-08 PROCEDURE — 99213 OFFICE O/P EST LOW 20 MIN: CPT | Performed by: PHYSICIAN ASSISTANT

## 2019-08-08 PROCEDURE — 81001 URINALYSIS AUTO W/SCOPE: CPT | Performed by: PHYSICIAN ASSISTANT

## 2019-08-08 PROCEDURE — 87186 SC STD MICRODIL/AGAR DIL: CPT | Performed by: PHYSICIAN ASSISTANT

## 2019-08-08 PROCEDURE — 87088 URINE BACTERIA CULTURE: CPT | Performed by: PHYSICIAN ASSISTANT

## 2019-08-08 RX ORDER — NITROFURANTOIN 25; 75 MG/1; MG/1
CAPSULE ORAL
Refills: 0 | COMMUNITY
Start: 2019-07-27 | End: 2019-08-08

## 2019-08-08 RX ORDER — CEFDINIR 300 MG/1
CAPSULE ORAL
Refills: 0 | COMMUNITY
Start: 2019-06-12 | End: 2019-08-08

## 2019-08-08 RX ORDER — SULFAMETHOXAZOLE/TRIMETHOPRIM 800-160 MG
1 TABLET ORAL 2 TIMES DAILY
Qty: 14 TABLET | Refills: 0 | Status: SHIPPED | OUTPATIENT
Start: 2019-08-08 | End: 2019-08-13

## 2019-08-08 ASSESSMENT — ENCOUNTER SYMPTOMS
HEADACHES: 0
ABDOMINAL PAIN: 0
NAUSEA: 0
HEARTBURN: 0
WEAKNESS: 0
JOINT SWELLING: 1
COUGH: 0
NERVOUS/ANXIOUS: 0
PARESTHESIAS: 0
CHILLS: 0
DIZZINESS: 0
BREAST MASS: 0
SHORTNESS OF BREATH: 0
HEMATURIA: 0
FREQUENCY: 0
EYE PAIN: 0
SORE THROAT: 0
HEMATOCHEZIA: 0
FEVER: 0
PALPITATIONS: 0
MYALGIAS: 0
DIARRHEA: 0
CONSTIPATION: 0
DYSURIA: 0

## 2019-08-08 ASSESSMENT — ACTIVITIES OF DAILY LIVING (ADL): CURRENT_FUNCTION: NO ASSISTANCE NEEDED

## 2019-08-08 NOTE — PATIENT INSTRUCTIONS
Start Bactrim. I will send a message through [facility] when the culture returns. Follow up over the weekend if symptoms worsening.

## 2019-08-08 NOTE — PROGRESS NOTES
Subjective     Angeli Jacobson is a 69 year old female who presents to clinic today for the following health issues:    HPI   URINARY TRACT SYMPTOMS-[facility] Follow up as well  Onset: 2 days    Description:   Painful urination (Dysuria): YES           Frequency: YES- the pressure as well to go   Urgency: Yes  Blood in urine (Hematuria): no   Delay in urine (Hesitency): no     Intensity: mild    Progression of Symptoms:  worsening    Accompanying Signs & Symptoms:  Fever/chills: no   Flank pain no   Nausea and vomiting: no   Any vaginal symptoms: none  Abdominal/Pelvic Pain: no, bloated    History:   History of frequent UTI's: YES-is in the process of seeing Urologist, had ultra sound of the kidneys and that was normal, does have prolapse uterus and bladder  History of kidney stones: no   Sexually Active: YES  Possibility of pregnancy: No    Precipitating factors:   none    Therapies Tried and outcome: Macrobid-helped, now sx coming back, cystex-OTC med for UTI- helped with urgency and frequency    Previously been on Cefdinir (6/12/2019)- stopped and switched to Macrobid due to side efects (diarrhea) and most recently Macrobid (7/27/2019).    Patient has noticed that after intimacy with  she will be diagnosed with a bladder infection. She is wondering if there are options to help prevent this.    Patient Active Problem List   Diagnosis     Pain in joint, multiple sites     MULTINODUL GOITER     Hearing loss     Hyperlipidemia LDL goal <70     Osteoporosis     Cervicalgia     Major depressive disorder, recurrent episode, moderate (H)     Impaired fasting glucose     Lymphedema of left leg     Tubular adenoma     Other constipation     Vertigo     Myxoid liposarcoma (HCC)     PAD (peripheral artery disease) (H)     Vascular graft occlusion (H)     Femoral-popliteal bypass graft occlusion, left (H)     History of sarcoma     S/P ORIF (open reduction internal fixation) fracture     Hip pain, left      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)     Urge incontinence of urine     Postural urinary incontinence     Personal history of urinary tract infection     OAB (overactive bladder)       Current Outpatient Medications   Medication     Acetaminophen 325 MG CAPS     alendronate (FOSAMAX) 70 MG tablet     aspirin (ASA) 81 MG chewable tablet     CALCIUM PO     clopidogrel (PLAVIX) 75 MG tablet     ezetimibe (ZETIA) 10 MG tablet     ezetimibe (ZETIA) 10 MG tablet     meclizine (ANTIVERT) 25 MG tablet     multivitamin, therapeutic (THERA-VIT) TABS tablet     ondansetron (ZOFRAN ODT) 4 MG ODT tab     order for DME     order for DME     ORDER FOR DME     oxybutynin ER (DITROPAN-XL) 5 MG 24 hr tablet     rosuvastatin (CRESTOR) 40 MG tablet     venlafaxine (EFFEXOR-XR) 37.5 MG 24 hr capsule     Current Facility-Administered Medications   Medication     sodium chloride (PF) 0.9% PF flush 10 mL       Allergies   Allergen Reactions     Celexa [Citalopram Hydrobromide]      Decreased libido         Reviewed and updated as needed this visit by Provider  Allergies  Meds  Problems         Review of Systems   GENERAL:  No fevers      Objective    /77 (BP Location: Left arm, Patient Position: Chair, Cuff Size: Adult Regular)   Pulse 78   Temp 97.6  F (36.4  C) (Oral)   Resp 12   Wt 68.9 kg (152 lb)   LMP 03/18/2005   SpO2 96%   BMI 24.53 kg/m    Body mass index is 24.53 kg/m .  Physical Exam   GENERAL: No acute distress  HEENT: Normocephalic,  CARDIAC: Regular rate and rhythm. No murmurs.  PULMONARY: Lungs are clear to auscultation bilaterally. No wheezes, rhonchi or crackles.  : No CVA tenderness bilaterally.   NEURO: Alert and non-focal    Diagnostic Test Results:  Results for orders placed or performed in visit on 08/08/19 (from the past 24 hour(s))   UA reflex to Microscopic and Culture   Result Value Ref Range    Color Urine Yellow     Appearance Urine Slightly Cloudy     Glucose Urine Negative  NEG^Negative mg/dL    Bilirubin Urine Negative NEG^Negative    Ketones Urine Negative NEG^Negative mg/dL    Specific Gravity Urine 1.015 1.003 - 1.035    Blood Urine Trace (A) NEG^Negative    pH Urine 7.0 5.0 - 7.0 pH    Protein Albumin Urine Negative NEG^Negative mg/dL    Urobilinogen Urine 0.2 0.2 - 1.0 EU/dL    Nitrite Urine Negative NEG^Negative    Leukocyte Esterase Urine Small (A) NEG^Negative    Source Midstream Urine    Urine Microscopic   Result Value Ref Range    WBC Urine 5-10 (A) OTO5^0 - 5 /HPF    RBC Urine O - 2 OTO2^O - 2 /HPF    Squamous Epithelial /LPF Urine Few FEW^Few /LPF    Bacteria Urine Few (A) NEG^Negative /HPF           Assessment & Plan     1. Acute cystitis without hematuria  Culture was obtained for further evaluation.  In the meantime I will treat patient with Bactrim for presumed infection.  I will follow-up with patient via [facility] once urine culture results return.  At that time we can change the Bactrim to another antibiotic if need be.  In regards to her question about medications after intercourse to prevent bladder infection, I recommended she follow-up with her primary care team.  We did discuss that Bactrim is 1 of those medications that can be used following intercourse and trimethoprim daily can also be used for prevention.  - UA reflex to Microscopic and Culture  - Urine Microscopic  - Urine Culture Aerobic Bacterial  - sulfamethoxazole-trimethoprim (BACTRIM DS/SEPTRA DS) 800-160 MG tablet; Take 1 tablet by mouth 2 times daily for 7 days  Dispense: 14 tablet; Refill: 0    Return in about 3 days (around 8/11/2019) for if not improving or if worsening.    Muriel Orosco PA-C  [facility]

## 2019-08-09 ENCOUNTER — THERAPY VISIT (OUTPATIENT)
Dept: PHYSICAL THERAPY | Facility: CLINIC | Age: 69
End: 2019-08-09
Payer: COMMERCIAL

## 2019-08-09 DIAGNOSIS — Z98.890 S/P ORIF (OPEN REDUCTION INTERNAL FIXATION) FRACTURE: ICD-10-CM

## 2019-08-09 DIAGNOSIS — Z87.81 S/P ORIF (OPEN REDUCTION INTERNAL FIXATION) FRACTURE: ICD-10-CM

## 2019-08-09 DIAGNOSIS — M25.552 HIP PAIN, LEFT: ICD-10-CM

## 2019-08-09 PROCEDURE — 97110 THERAPEUTIC EXERCISES: CPT | Mod: GP | Performed by: PHYSICAL THERAPIST

## 2019-08-09 PROCEDURE — 97530 THERAPEUTIC ACTIVITIES: CPT | Mod: GP | Performed by: PHYSICAL THERAPIST

## 2019-08-09 PROCEDURE — 97112 NEUROMUSCULAR REEDUCATION: CPT | Mod: GP | Performed by: PHYSICAL THERAPIST

## 2019-08-09 ASSESSMENT — ACTIVITIES OF DAILY LIVING (ADL)
LIGHT_TO_MODERATE_WORK: SLIGHT DIFFICULTY
RECREATIONAL_ACTIVITIES: EXTREME DIFFICULTY
TWISTING/PIVOTING_ON_INVOLVED_LEG: MODERATE DIFFICULTY
HOS_ADL_SCORE(%): 63.24
WALKING_APPROXIMATELY_10_MINUTES: NO DIFFICULTY AT ALL
HOS_ADL_ITEM_SCORE_TOTAL: 43
GOING_UP_1_FLIGHT_OF_STAIRS: SLIGHT DIFFICULTY
WALKING_UP_STEEP_HILLS: MODERATE DIFFICULTY
GETTING_INTO_AND_OUT_OF_A_BATHTUB: MODERATE DIFFICULTY
HEAVY_WORK: EXTREME DIFFICULTY
DEEP_SQUATTING: MODERATE DIFFICULTY
GOING_DOWN_1_FLIGHT_OF_STAIRS: SLIGHT DIFFICULTY
WALKING_INITIALLY: SLIGHT DIFFICULTY
ROLLING_OVER_IN_BED: SLIGHT DIFFICULTY
HOS_ADL_HIGHEST_POTENTIAL_SCORE: 68
WALKING_15_MINUTES_OR_GREATER: SLIGHT DIFFICULTY
STANDING_FOR_15_MINUTES: SLIGHT DIFFICULTY
HOS_ADL_COUNT: 17
WALKING_DOWN_STEEP_HILLS: MODERATE DIFFICULTY
PUTTING_ON_SOCKS_AND_SHOES: SLIGHT DIFFICULTY
GETTING_INTO_AND_OUT_OF_AN_AVERAGE_CAR: SLIGHT DIFFICULTY
STEPPING_UP_AND_DOWN_CURBS: SLIGHT DIFFICULTY
HOW_WOULD_YOU_RATE_YOUR_CURRENT_LEVEL_OF_FUNCTION_DURING_YOUR_USUAL_ACTIVITIES_OF_DAILY_LIVING_FROM_0_TO_100_WITH_100_BEING_YOUR_LEVEL_OF_FUNCTION_PRIOR_TO_YOUR_HIP_PROBLEM_AND_0_BEING_THE_INABILITY_TO_PERFORM_ANY_OF_YOUR_USUAL_DAILY_ACTIVITIES?: 60
SITTING_FOR_15_MINUTES: NO DIFFICULTY AT ALL

## 2019-08-09 NOTE — PROGRESS NOTES
Subjective:  HPI                    Objective:  System    Physical Exam    General     ROS    Assessment/Plan:    PROGRESS  REPORT    Progress reporting period is from IE to 8/9/19.       SUBJECTIVE  Subjective changes noted by patient:  .  Subjective: Doing better with lymph pump at home.  Not using AD majority of time.  Pain minimal    Current pain level is  Current Pain level: 2/10.     Previous pain level was   Initial Pain level: 4/10.   Changes in function:  Yes (See Goal flowsheet attached for changes in current functional level)  Adverse reaction to treatment or activity: None    OBJECTIVE  Changes noted in objective findings:    Objective: R knee 0-0-95*.  Bridge x 20.  SLS R 30 sec, 10sec airex pad.  Fair dynamic hip control     ASSESSMENT/PLAN  Updated problem list and treatment plan: Diagnosis 1:  S/P L hip ORIF/L knee pain  Pain -  home program  Decreased ROM/flexibility - manual therapy and therapeutic exercise  Decreased strength - therapeutic exercise and therapeutic activities  Decreased proprioception - neuro re-education and therapeutic activities  Edema - vasopneumatics, cold therapy and self management/home program  Impaired gait - gait training  Impaired muscle performance - neuro re-education  Decreased function - therapeutic activities  STG/LTGs have been met or progress has been made towards goals:  Yes (See Goal flow sheet completed today.)  Assessment of Progress: The patient's condition is improving.  The patient's condition has potential to improve.  Self Management Plans:  Patient has been instructed in a home treatment program.  Patient  has been instructed in self management of symptoms.  I have re-evaluated this patient and find that the nature, scope, duration and intensity of the therapy is appropriate for the medical condition of the patient.  Angeli continues to require the following intervention to meet STG and LTG's:  PT    Recommendations:  This patient would benefit from  continued therapy.     Frequency:  1 X week, once daily  Duration:  for 4-6 weeks        Please refer to the daily flowsheet for treatment today, total treatment time and time spent performing 1:1 timed codes.

## 2019-08-10 LAB
BACTERIA SPEC CULT: ABNORMAL
BACTERIA SPEC CULT: ABNORMAL
SPECIMEN SOURCE: ABNORMAL

## 2019-08-13 ENCOUNTER — OFFICE VISIT (OUTPATIENT)
Dept: FAMILY MEDICINE | Facility: CLINIC | Age: 69
End: 2019-08-13
Payer: COMMERCIAL

## 2019-08-13 VITALS
HEART RATE: 70 BPM | DIASTOLIC BLOOD PRESSURE: 78 MMHG | RESPIRATION RATE: 16 BRPM | BODY MASS INDEX: 23.73 KG/M2 | SYSTOLIC BLOOD PRESSURE: 126 MMHG | HEIGHT: 67 IN | TEMPERATURE: 97.9 F | WEIGHT: 151.2 LBS

## 2019-08-13 DIAGNOSIS — N39.46 MIXED INCONTINENCE URGE AND STRESS (MALE)(FEMALE): ICD-10-CM

## 2019-08-13 DIAGNOSIS — M81.0 AGE-RELATED OSTEOPOROSIS WITHOUT CURRENT PATHOLOGICAL FRACTURE: ICD-10-CM

## 2019-08-13 DIAGNOSIS — Z85.831 HISTORY OF SARCOMA: ICD-10-CM

## 2019-08-13 DIAGNOSIS — Z00.00 ENCOUNTER FOR MEDICARE ANNUAL WELLNESS EXAM: Primary | ICD-10-CM

## 2019-08-13 DIAGNOSIS — E04.2 NONTOXIC MULTINODULAR GOITER: ICD-10-CM

## 2019-08-13 DIAGNOSIS — N39.0 RECURRENT UTI: ICD-10-CM

## 2019-08-13 DIAGNOSIS — E78.5 HYPERLIPIDEMIA LDL GOAL <70: ICD-10-CM

## 2019-08-13 DIAGNOSIS — I73.9 PAD (PERIPHERAL ARTERY DISEASE) (H): ICD-10-CM

## 2019-08-13 PROCEDURE — 36415 COLL VENOUS BLD VENIPUNCTURE: CPT | Performed by: PHYSICIAN ASSISTANT

## 2019-08-13 PROCEDURE — 99397 PER PM REEVAL EST PAT 65+ YR: CPT | Performed by: PHYSICIAN ASSISTANT

## 2019-08-13 PROCEDURE — 84443 ASSAY THYROID STIM HORMONE: CPT | Performed by: PHYSICIAN ASSISTANT

## 2019-08-13 PROCEDURE — 84439 ASSAY OF FREE THYROXINE: CPT | Performed by: PHYSICIAN ASSISTANT

## 2019-08-13 PROCEDURE — 80061 LIPID PANEL: CPT | Performed by: PHYSICIAN ASSISTANT

## 2019-08-13 RX ORDER — NITROFURANTOIN 25; 75 MG/1; MG/1
CAPSULE ORAL
Qty: 30 CAPSULE | Refills: 3 | Status: ON HOLD | OUTPATIENT
Start: 2019-08-13 | End: 2020-12-22

## 2019-08-13 RX ORDER — ROSUVASTATIN CALCIUM 40 MG/1
40 TABLET, COATED ORAL EVERY EVENING
Qty: 90 TABLET | Refills: 3 | Status: SHIPPED | OUTPATIENT
Start: 2019-08-13 | End: 2020-03-31

## 2019-08-13 RX ORDER — EZETIMIBE 10 MG/1
10 TABLET ORAL DAILY
Qty: 90 TABLET | Refills: 3 | Status: SHIPPED | OUTPATIENT
Start: 2019-08-13 | End: 2020-05-20

## 2019-08-13 RX ORDER — OXYBUTYNIN CHLORIDE 5 MG/1
5 TABLET, EXTENDED RELEASE ORAL DAILY
Qty: 90 TABLET | Refills: 1 | Status: SHIPPED | OUTPATIENT
Start: 2019-08-13 | End: 2020-03-26

## 2019-08-13 RX ORDER — ALENDRONATE SODIUM 70 MG/1
TABLET ORAL
Qty: 12 TABLET | Refills: 3 | Status: SHIPPED | OUTPATIENT
Start: 2019-08-13 | End: 2020-09-01

## 2019-08-13 ASSESSMENT — ENCOUNTER SYMPTOMS
ABDOMINAL PAIN: 0
PARESTHESIAS: 0
JOINT SWELLING: 1
SORE THROAT: 0
CONSTIPATION: 0
FREQUENCY: 0
SHORTNESS OF BREATH: 0
EYE PAIN: 0
PALPITATIONS: 0
HEMATOCHEZIA: 0
DYSURIA: 0
HEMATURIA: 0
NAUSEA: 0
BREAST MASS: 0
HEADACHES: 0
WEAKNESS: 0
FEVER: 0
DIZZINESS: 0
COUGH: 0
HEARTBURN: 0
NERVOUS/ANXIOUS: 0
MYALGIAS: 0
DIARRHEA: 0
CHILLS: 0

## 2019-08-13 ASSESSMENT — ACTIVITIES OF DAILY LIVING (ADL): CURRENT_FUNCTION: NO ASSISTANCE NEEDED

## 2019-08-13 ASSESSMENT — ANXIETY QUESTIONNAIRES
GAD7 TOTAL SCORE: 0
7. FEELING AFRAID AS IF SOMETHING AWFUL MIGHT HAPPEN: NOT AT ALL
GAD7 TOTAL SCORE: 0
4. TROUBLE RELAXING: NOT AT ALL
7. FEELING AFRAID AS IF SOMETHING AWFUL MIGHT HAPPEN: NOT AT ALL
6. BECOMING EASILY ANNOYED OR IRRITABLE: NOT AT ALL
GAD7 TOTAL SCORE: 0
1. FEELING NERVOUS, ANXIOUS, OR ON EDGE: NOT AT ALL
5. BEING SO RESTLESS THAT IT IS HARD TO SIT STILL: NOT AT ALL
3. WORRYING TOO MUCH ABOUT DIFFERENT THINGS: NOT AT ALL
2. NOT BEING ABLE TO STOP OR CONTROL WORRYING: NOT AT ALL

## 2019-08-13 ASSESSMENT — PATIENT HEALTH QUESTIONNAIRE - PHQ9
10. IF YOU CHECKED OFF ANY PROBLEMS, HOW DIFFICULT HAVE THESE PROBLEMS MADE IT FOR YOU TO DO YOUR WORK, TAKE CARE OF THINGS AT HOME, OR GET ALONG WITH OTHER PEOPLE: NOT DIFFICULT AT ALL
SUM OF ALL RESPONSES TO PHQ QUESTIONS 1-9: 0
SUM OF ALL RESPONSES TO PHQ QUESTIONS 1-9: 0

## 2019-08-13 ASSESSMENT — MIFFLIN-ST. JEOR: SCORE: 1235.53

## 2019-08-13 NOTE — PROGRESS NOTES
"SUBJECTIVE:   Angeli Jacobson is a 69 year old female who presents for Preventive Visit.  Are you in the first 12 months of your Medicare coverage?  No    Healthy Habits:     In general, how would you rate your overall health?  Good    Frequency of exercise:  1 day/week    Duration of exercise:  15-30 minutes    Do you usually eat at least 4 servings of fruit and vegetables a day, include whole grains    & fiber and avoid regularly eating high fat or \"junk\" foods?  Yes    Taking medications regularly:  Yes    Medication side effects:  None    Ability to successfully perform activities of daily living:  No assistance needed    Home Safety:  No safety concerns identified    Hearing Impairment:  Difficulty following a conversation in a noisy restaurant or crowded room, need to ask people to speak up or repeat themselves and difficulty understanding soft or whispered speech    In the past 6 months, have you been bothered by leaking of urine? Yes    In general, how would you rate your overall mental or emotional health?  Excellent      PHQ-2 Total Score: 0    Additional concerns today:  No    Do you feel safe in your environment? Yes    Do you have a Health Care Directive? Yes: Advance Directive has been received and scanned.      Fall risk       Cognitive Screening Do you have sleep apnea, excessive snoring or daytime drowsiness?: no    Reviewed and updated as needed this visit by clinical staff  Tobacco  Allergies  Meds  Med Hx  Surg Hx  Fam Hx  Soc Hx        Reviewed and updated as needed this visit by Provider        Social History     Tobacco Use     Smoking status: Never Smoker     Smokeless tobacco: Never Used   Substance Use Topics     Alcohol use: No     Frequency: Monthly or less     Drinks per session: 1 or 2     Binge frequency: Never         Alcohol Use 8/8/2019   Prescreen: >3 drinks/day or >7 drinks/week? Not Applicable   Prescreen: >3 drinks/day or >7 drinks/week? -               Current " providers sharing in care for this patient include:   Patient Care Team:  Sepideh Burris PA-C as PCP - General (Physician Assistant)  Sepideh Burris PA-C as Assigned PCP    The following health maintenance items are reviewed in Epic and correct as of today:  Health Maintenance   Topic Date Due     ANNUAL REVIEW OF HM ORDERS  1950     ZOSTER IMMUNIZATION (2 of 3) 03/20/2015     MEDICARE ANNUAL WELLNESS VISIT  08/28/2019     INFLUENZA VACCINE (1) 09/01/2019     DEXA  10/18/2019     PHQ-9  10/30/2019     CYNDI ASSESSMENT  12/13/2019     FALL RISK ASSESSMENT  01/15/2020     LIPID  04/30/2020     MAMMO SCREENING  11/28/2020     COLONOSCOPY  02/05/2021     ADVANCE CARE PLANNING  12/20/2023     DTAP/TDAP/TD IMMUNIZATION (4 - Td) 08/28/2028     HEPATITIS C SCREENING  Completed     DEPRESSION ACTION PLAN  Completed     IPV IMMUNIZATION  Aged Out     MENINGITIS IMMUNIZATION  Aged Out     Lab work is in process  Pneumonia Vaccine:Adults age 65+ who received Pneumovax (PPSV23) at 65 years or older: Should be given PCV13 > 1 year after their most recent PPSV23    Review of Systems   Constitutional: Negative for chills and fever.   HENT: Positive for hearing loss. Negative for congestion, ear pain and sore throat.    Eyes: Negative for pain and visual disturbance.   Respiratory: Negative for cough and shortness of breath.    Cardiovascular: Positive for peripheral edema. Negative for chest pain and palpitations.   Gastrointestinal: Negative for abdominal pain, constipation, diarrhea, heartburn, hematochezia and nausea.   Breasts:  Negative for tenderness, breast mass and discharge.   Genitourinary: Negative for dysuria, frequency, genital sores, hematuria, pelvic pain, urgency, vaginal bleeding and vaginal discharge.   Musculoskeletal: Positive for joint swelling. Negative for myalgias.   Skin: Negative for rash.   Neurological: Negative for dizziness, weakness, headaches and paresthesias.   Psychiatric/Behavioral:  "Negative for mood changes. The patient is not nervous/anxious.          OBJECTIVE:   /78 (BP Location: Right arm, Patient Position: Sitting, Cuff Size: Adult Regular)   Pulse 70   Temp 97.9  F (36.6  C) (Oral)   Resp 16   Ht 1.689 m (5' 6.5\")   Wt 68.6 kg (151 lb 3.2 oz)   LMP 03/18/2005   BMI 24.04 kg/m   Estimated body mass index is 24.04 kg/m  as calculated from the following:    Height as of this encounter: 1.689 m (5' 6.5\").    Weight as of this encounter: 68.6 kg (151 lb 3.2 oz).  Physical Exam  GENERAL APPEARANCE: healthy, alert and no distress  EYES: Eyes grossly normal to inspection, PERRL and conjunctivae and sclerae normal  HENT: ear canals and TM's normal, nose and mouth without ulcers or lesions, oropharynx clear and oral mucous membranes moist  NECK: no adenopathy, no asymmetry, masses, or scars and thyroid normal to palpation  RESP: lungs clear to auscultation - no rales, rhonchi or wheezes  BREAST: normal without masses, tenderness or nipple discharge and no palpable axillary masses or adenopathy  CV: regular rate and rhythm, normal S1 S2, no S3 or S4, no murmur, click or rub, no peripheral edema and peripheral pulses strong  ABDOMEN: soft, nontender, no hepatosplenomegaly, no masses and bowel sounds normal  MS: no musculoskeletal defects are noted and gait is age appropriate without ataxia  SKIN: no suspicious lesions or rashes  NEURO: Normal strength and tone, sensory exam grossly normal, mentation intact and speech normal  PSYCH: mentation appears normal and affect normal/bright        ASSESSMENT / PLAN:   1. Encounter for Medicare annual wellness exam    - Lipid panel reflex to direct LDL Fasting    2. Age-related osteoporosis without current pathological fracture    - DX Hip/Pelvis/Spine; Future  - alendronate (FOSAMAX) 70 MG tablet; Take 1 tablet (70 mg) by mouth with 8oz water every 7 days 30 minutes before breakfast and remain upright during this time.  Dispense: 12 tablet; " "Refill: 3    3. Hyperlipidemia LDL goal <70    - Lipid panel reflex to direct LDL Fasting  - ezetimibe (ZETIA) 10 MG tablet; Take 1 tablet (10 mg) by mouth daily  Dispense: 90 tablet; Refill: 3  - rosuvastatin (CRESTOR) 40 MG tablet; Take 1 tablet (40 mg) by mouth every evening  Dispense: 90 tablet; Refill: 3    4. Mixed incontinence urge and stress (male)(female)    - oxybutynin ER (DITROPAN-XL) 5 MG 24 hr tablet; Take 1 tablet (5 mg) by mouth daily  Dispense: 90 tablet; Refill: 1    5. History of sarcoma    - rosuvastatin (CRESTOR) 40 MG tablet; Take 1 tablet (40 mg) by mouth every evening  Dispense: 90 tablet; Refill: 3    6. PAD (peripheral artery disease) (H)    - rosuvastatin (CRESTOR) 40 MG tablet; Take 1 tablet (40 mg) by mouth every evening  Dispense: 90 tablet; Refill: 3    7. Recurrent UTI    - nitroFURantoin macrocrystal-monohydrate (MACROBID) 100 MG capsule; Take 1 tablet after intercourse  Dispense: 30 capsule; Refill: 3    8. MULTINODUL GOITER    - TSH with free T4 reflex    End of Life Planning:  Patient currently has an advanced directive: Yes.  Practitioner is supportive of decision.    COUNSELING:  Reviewed preventive health counseling, as reflected in patient instructions       Regular exercise       Healthy diet/nutrition       Vision screening       Hearing screening       Dental care       Bladder control       Fall risk prevention       Osteoporosis Prevention/Bone Health    Estimated body mass index is 24.04 kg/m  as calculated from the following:    Height as of this encounter: 1.689 m (5' 6.5\").    Weight as of this encounter: 68.6 kg (151 lb 3.2 oz).         reports that she has never smoked. She has never used smokeless tobacco.      Appropriate preventive services were discussed with this patient, including applicable screening as appropriate for cardiovascular disease, diabetes, osteopenia/osteoporosis, and glaucoma.  As appropriate for age/gender, discussed screening for colorectal " cancer, prostate cancer, breast cancer, and cervical cancer. Checklist reviewing preventive services available has been given to the patient.    Reviewed patients plan of care and provided an AVS. The Intermediate Care Plan ( asthma action plan, low back pain action plan, and migraine action plan) for Angeli meets the Care Plan requirement. This Care Plan has been established and reviewed with the Patient.    Counseling Resources:  ATP IV Guidelines  Pooled Cohorts Equation Calculator  Breast Cancer Risk Calculator  FRAX Risk Assessment  ICSI Preventive Guidelines  Dietary Guidelines for Americans, 2010  [employer] MyPlate  ASA Prophylaxis  Lung CA Screening    Sepideh Burris PA-C  [facility]    Identified Health Risks:  Answers for HPI/ROS submitted by the patient on 8/13/2019   Annual Exam:  If you checked off any problems, how difficult have these problems made it for you to do your work, take care of things at home, or get along with other people?: Not difficult at all  PHQ9 TOTAL SCORE: 0  CYNDI 7 TOTAL SCORE: 0

## 2019-08-13 NOTE — PATIENT INSTRUCTIONS
Please call: [phone]---mammogram and DEXA      Patient Education   Personalized Prevention Plan  You are due for the preventive services outlined below.  Your care team is available to assist you in scheduling these services.  If you have already completed any of these items, please share that information with your care team to update in your medical record.  Health Maintenance Due   Topic Date Due     ANNUAL REVIEW OF HM ORDERS  1950     Zoster (Shingles) Vaccine (2 of 3) 03/20/2015     Annual Wellness Visit  08/28/2019

## 2019-08-14 ENCOUNTER — MYC MEDICAL ADVICE (OUTPATIENT)
Dept: FAMILY MEDICINE | Facility: CLINIC | Age: 69
End: 2019-08-14

## 2019-08-14 DIAGNOSIS — E04.2 NONTOXIC MULTINODULAR GOITER: Primary | ICD-10-CM

## 2019-08-14 LAB
CHOLEST SERPL-MCNC: 200 MG/DL
HDLC SERPL-MCNC: 55 MG/DL
LDLC SERPL CALC-MCNC: 126 MG/DL
NONHDLC SERPL-MCNC: 145 MG/DL
T4 FREE SERPL-MCNC: 0.72 NG/DL (ref 0.76–1.46)
TRIGL SERPL-MCNC: 97 MG/DL
TSH SERPL DL<=0.005 MIU/L-ACNC: 7.71 MU/L (ref 0.4–4)

## 2019-08-14 ASSESSMENT — ANXIETY QUESTIONNAIRES: GAD7 TOTAL SCORE: 0

## 2019-08-14 ASSESSMENT — PATIENT HEALTH QUESTIONNAIRE - PHQ9: SUM OF ALL RESPONSES TO PHQ QUESTIONS 1-9: 0

## 2019-08-20 ENCOUNTER — THERAPY VISIT (OUTPATIENT)
Dept: PHYSICAL THERAPY | Facility: CLINIC | Age: 69
End: 2019-08-20
Payer: COMMERCIAL

## 2019-08-20 DIAGNOSIS — M25.552 HIP PAIN, LEFT: ICD-10-CM

## 2019-08-20 DIAGNOSIS — Z98.890 S/P ORIF (OPEN REDUCTION INTERNAL FIXATION) FRACTURE: ICD-10-CM

## 2019-08-20 DIAGNOSIS — Z87.81 S/P ORIF (OPEN REDUCTION INTERNAL FIXATION) FRACTURE: ICD-10-CM

## 2019-08-20 PROCEDURE — 97530 THERAPEUTIC ACTIVITIES: CPT | Mod: GP | Performed by: PHYSICAL THERAPIST

## 2019-08-20 PROCEDURE — 97110 THERAPEUTIC EXERCISES: CPT | Mod: GP | Performed by: PHYSICAL THERAPIST

## 2019-08-20 PROCEDURE — 97112 NEUROMUSCULAR REEDUCATION: CPT | Mod: GP | Performed by: PHYSICAL THERAPIST

## 2019-09-02 DIAGNOSIS — Z85.831 HISTORY OF SARCOMA: Primary | ICD-10-CM

## 2019-09-03 NOTE — TELEPHONE ENCOUNTER
"Last Written Prescription Date:  2/25/19  Last Fill Quantity: 7 capsule,  # refills: 0   Last office visit: 8/13/2019 with prescribing provider:  YOON Burris   Future Office Visit:      Routing refill request to provider for review/approval because:  Drug not active on patient's medication list    Discontinued 8/13/2019, Reason for Discontinue: Therapy completed    [facility] [place] Follow-up to PHQ 12/13/2018 4/30/2019 8/13/2019   PHQ-9 9. Suicide Ideation past 2 weeks Not at all Not at all Not at all     Requested Prescriptions   Pending Prescriptions Disp Refills     venlafaxine (EFFEXOR-XR) 37.5 MG 24 hr capsule [Pharmacy Med Name: VENLAFAXINE HCL ER 37.5 MG CAP] 90 capsule 0     Sig: TAKE 1 CAPSULE BY MOUTH EVERY DAY BEFORE BREAKFAST       Serotonin-Norepinephrine Reuptake Inhibitors  Failed - 9/2/2019  1:33 PM        Failed - Medication is active on med list        Passed - Blood pressure under 140/90 in past 12 months     BP Readings from Last 3 Encounters:   08/13/19 126/78   08/08/19 118/77   07/10/19 122/72                 Passed - Recent (12 mo) or future (30 days) visit within the authorizing provider's specialty     Patient had office visit in the last 12 months or has a visit in the next 30 days with authorizing provider or within the authorizing provider's specialty.  See \"Patient Info\" tab in inbasket, or \"Choose Columns\" in Meds & Orders section of the refill encounter.              Passed - Patient is age 18 or older        Passed - No active pregnancy on record        Passed - Normal serum creatinine on file in past 12 months     Recent Labs   Lab Test 06/04/19  1530   CR 0.55             Passed - No positive pregnancy test in past 12 months          "

## 2019-09-04 RX ORDER — VENLAFAXINE HYDROCHLORIDE 37.5 MG/1
CAPSULE, EXTENDED RELEASE ORAL
Qty: 90 CAPSULE | Refills: 1 | Status: SHIPPED | OUTPATIENT
Start: 2019-09-04 | End: 2019-09-26

## 2019-09-04 NOTE — TELEPHONE ENCOUNTER
Routing refill request to provider for review/approval because:  Drug not active on patient's medication list  Naye Reynoso RN

## 2019-09-09 ENCOUNTER — THERAPY VISIT (OUTPATIENT)
Dept: PHYSICAL THERAPY | Facility: CLINIC | Age: 69
End: 2019-09-09
Payer: COMMERCIAL

## 2019-09-09 DIAGNOSIS — Z87.81 S/P ORIF (OPEN REDUCTION INTERNAL FIXATION) FRACTURE: ICD-10-CM

## 2019-09-09 DIAGNOSIS — Z98.890 S/P ORIF (OPEN REDUCTION INTERNAL FIXATION) FRACTURE: ICD-10-CM

## 2019-09-09 DIAGNOSIS — M25.552 HIP PAIN, LEFT: ICD-10-CM

## 2019-09-09 PROCEDURE — 97530 THERAPEUTIC ACTIVITIES: CPT | Mod: GP | Performed by: PHYSICAL THERAPIST

## 2019-09-09 PROCEDURE — 97110 THERAPEUTIC EXERCISES: CPT | Mod: GP | Performed by: PHYSICAL THERAPIST

## 2019-09-11 ENCOUNTER — MYC MEDICAL ADVICE (OUTPATIENT)
Dept: UROLOGY | Facility: CLINIC | Age: 69
End: 2019-09-11

## 2019-09-11 ENCOUNTER — OFFICE VISIT (OUTPATIENT)
Dept: UROLOGY | Facility: CLINIC | Age: 69
End: 2019-09-11
Payer: COMMERCIAL

## 2019-09-11 VITALS
DIASTOLIC BLOOD PRESSURE: 72 MMHG | BODY MASS INDEX: 24.99 KG/M2 | WEIGHT: 150 LBS | OXYGEN SATURATION: 96 % | SYSTOLIC BLOOD PRESSURE: 118 MMHG | HEIGHT: 65 IN | HEART RATE: 72 BPM

## 2019-09-11 DIAGNOSIS — Z87.440 PERSONAL HISTORY OF URINARY TRACT INFECTION: ICD-10-CM

## 2019-09-11 DIAGNOSIS — N32.9 LESION OF BLADDER: ICD-10-CM

## 2019-09-11 DIAGNOSIS — N39.41 URGE INCONTINENCE OF URINE: Primary | ICD-10-CM

## 2019-09-11 DIAGNOSIS — M62.89 PELVIC FLOOR DYSFUNCTION: ICD-10-CM

## 2019-09-11 DIAGNOSIS — M79.18 MYALGIA OF PELVIC FLOOR: ICD-10-CM

## 2019-09-11 DIAGNOSIS — K59.00 CONSTIPATION, UNSPECIFIED CONSTIPATION TYPE: ICD-10-CM

## 2019-09-11 LAB
ALBUMIN UR-MCNC: NEGATIVE MG/DL
APPEARANCE UR: CLEAR
BILIRUB UR QL STRIP: NEGATIVE
COLOR UR AUTO: YELLOW
GLUCOSE UR STRIP-MCNC: NEGATIVE MG/DL
HGB UR QL STRIP: ABNORMAL
KETONES UR STRIP-MCNC: NEGATIVE MG/DL
LEUKOCYTE ESTERASE UR QL STRIP: NEGATIVE
NITRATE UR QL: NEGATIVE
PH UR STRIP: 5.5 PH (ref 5–7)
SOURCE: ABNORMAL
SP GR UR STRIP: 1.01 (ref 1–1.03)
UROBILINOGEN UR STRIP-ACNC: 0.2 EU/DL (ref 0.2–1)

## 2019-09-11 PROCEDURE — 88112 CYTOPATH CELL ENHANCE TECH: CPT | Performed by: UROLOGY

## 2019-09-11 PROCEDURE — 52000 CYSTOURETHROSCOPY: CPT | Performed by: UROLOGY

## 2019-09-11 PROCEDURE — 81003 URINALYSIS AUTO W/O SCOPE: CPT | Performed by: UROLOGY

## 2019-09-11 RX ORDER — LIDOCAINE HYDROCHLORIDE 20 MG/ML
JELLY TOPICAL ONCE
Status: DISCONTINUED | OUTPATIENT
Start: 2019-09-11 | End: 2019-09-11 | Stop reason: HOSPADM

## 2019-09-11 ASSESSMENT — MIFFLIN-ST. JEOR: SCORE: 1206.28

## 2019-09-11 ASSESSMENT — PAIN SCALES - GENERAL: PAINLEVEL: NO PAIN (0)

## 2019-09-11 NOTE — PATIENT INSTRUCTIONS
"Try a daily fiber supplement (the kind you mix in water)    Websites with free information:    American [employer] patient website: www.voicesforpfd.org    Total Control Program: www.totalcontrolprogram.com    Supplements to prevent UTI  -probiotics  -cranberry   Ellura: www.myellura.com   Theracran HP by [employer]  -d-mannose    Please see one of the dedicated pelvic floor physical therapist ([facility] [phone])    Please return to see me in 4 months, sooner if needed    It was a pleasure meeting with you today.  Thank you for allowing me and my team the privilege of caring for you today.  YOU are the reason we are here, and I truly hope we provided you with the excellent service you deserve.  Please let us know if there is anything else we can do for you so that we can be sure you are leaving completely satisfied with your care experience.    AFTER YOUR CYSTOSCOPY  ?  ?  You have just completed a cystoscopy, or \"cysto\", which allowed your physician to learn more about your bladder (or to remove a stent placed after surgery). We suggest that you continue to avoid caffeine, fruit juice, and alcohol for the next 24 hours, however, you are encouraged to return to your normal activities.  ?  ?  A few things that are considered normal after your cystoscopy:  ?  * small amount of bleeding (or spotting) that clears within the next 24 hours  ?  * slight burning sensation with urination  ?  * sensation of needing to void (urinate) more frequently  ?  * the feeling of \"air\" in your urine  ?  * mild discomfort that is relieved with Tylenol    * bladder spasms  ?  ?  ?  Please contact our office promptly if you:  ?  * develop a fever above 101 degrees  ?  * are unable to urinate  ?  * develop bright red blood that does not stop  ?  * experience severe pain or swelling  ?  ?  ?  And of course, please contact our office with any concerns or questions [phone]  ?    "

## 2019-09-11 NOTE — NURSING NOTE
Chief Complaint   Patient presents with     Cystoscopy     Patient is here for cystoscopy for urge incontinence, UTI's, and over active bladder.      Prior to the start of the procedure and with procedural staff participation, I verbally confirmed the patient s identity using two indicators, relevant allergies, that the procedure was appropriate and matched the consent or emergent situation, and that the correct equipment/implants were available. Immediately prior to starting the procedure I conducted the Time Out with the procedural staff and re-confirmed the patient s name, procedure, and site/side. I have wiped the patient off with the povidone-Iodine solution, draped them,  used Lidocaine hydrochloride jelly, and instilled sterile water into the bladder. (The Joint Commission universal protocol was followed.)  Yes    Sedation (Moderate or Deep): None    5mL 2% lidocaine hydrochloride Urojet instilled into urethra.    NDC# [address]-86  Lot #: [id number]  Expiration Date:  12/2020    I cleansed patient with betadine, and then cathaterized patient with 14 Macedonian straight catheter with ease. 90 ml's of yellow urine was obtained. Patient handled procedure well.     Charley Bryant, CMA

## 2019-09-11 NOTE — LETTER
"9/11/2019       RE: Angeli Jacobson  [id number]  [facility] [address]     Dear Colleague,    Thank you for referring your patient, Angeli Jacobson, to the [facility]OLOG[facility] [place] at [facility]. Please see a copy of my visit note below.    September 11, 2019    Return visit    Patient returns today for follow up for cystoscopy.  She states that by listening to her body she has less issues with the urgency incontinence.  She denies any changes in her health since last visit.    /72 (BP Location: Right arm, Patient Position: Sitting, Cuff Size: Adult Regular)   Pulse 72   Ht 1.651 m (5' 5\")   Wt 68 kg (150 lb)   LMP 03/18/2005   SpO2 96%   BMI 24.96 kg/m     She is comfortable, in no distress, non-labored breathing.  Abdomen is soft, non-tender, non-distended.  Normal external female genitalia.  Negative CST.  Pelvic exam is remarkable for stool palpable in the rectal vault.  There is some myofascial tenderness of the pelvic floor musculature as well, she is unable to localize her musculature    Cystoscopy Note: After informed consent was obtained patient was prepped and draped in the standard fashion.  The flexible cystoscope was inserted into a normal appearing urethral meatus.  The urothelium was carefully examined and there was a small red patch in the posterior wall most likely related to the straight catheterization from today.  The trigone was noted to be very irregular but there were no obvious tumors, masses, stones, foreign bodies, or other urothelial abnormalities noted.  Bilateral ureteral orifices were noted in the normal orthotopic position and both effluxed clear urine.  The cystoscope was retroflexed and the bladder neck was unremarkable.  The urethra was carefully examined upon removing the cystoscope and was unremarkable.  Patient tolerated the procedure without complications noted.      Urine dip small " blood otherwise negative    Renal US 7/31/2019 without obvious nidus of UTI    Cytology 7/10 was negative for malignancy    A/P: 69 year old F with OAB symptoms, urge incontinence, postural incontinence, and history of UTIs.    Continue post coital abx    Recommend daily fiber supplement for constipation    We discussed how her pelvic floor symptoms are related to the physical exam findings and her pelvic floor myofascial dysfunction.  We discussed how the recommended treatment is dedicated pelvic floor therapy.  We discussed how the pelvic floor physical therapy works and patient is agreeable.  Referral was placed.      Cytology repeat today and if normal return 4 months for cystoscopy    Jennifer Oquendo MD MPH   of Urology    CC  Patient Care Team:  Sepideh Burris PA-C as PCP - General (Physician Assistant)

## 2019-09-11 NOTE — PROGRESS NOTES
"September 11, 2019    Return visit    Patient returns today for follow up for cystoscopy.  She states that by listening to her body she has less issues with the urgency incontinence.  She denies any changes in her health since last visit.    /72 (BP Location: Right arm, Patient Position: Sitting, Cuff Size: Adult Regular)   Pulse 72   Ht 1.651 m (5' 5\")   Wt 68 kg (150 lb)   LMP 03/18/2005   SpO2 96%   BMI 24.96 kg/m    She is comfortable, in no distress, non-labored breathing.  Abdomen is soft, non-tender, non-distended.  Normal external female genitalia.  Negative CST.  Pelvic exam is remarkable for stool palpable in the rectal vault.  There is some myofascial tenderness of the pelvic floor musculature as well, she is unable to localize her musculature    Cystoscopy Note: After informed consent was obtained patient was prepped and draped in the standard fashion.  The flexible cystoscope was inserted into a normal appearing urethral meatus.  The urothelium was carefully examined and there was a small red patch in the posterior wall most likely related to the straight catheterization from today.  The trigone was noted to be very irregular but there were no obvious tumors, masses, stones, foreign bodies, or other urothelial abnormalities noted.  Bilateral ureteral orifices were noted in the normal orthotopic position and both effluxed clear urine.  The cystoscope was retroflexed and the bladder neck was unremarkable.  The urethra was carefully examined upon removing the cystoscope and was unremarkable.  Patient tolerated the procedure without complications noted.      Urine dip small blood otherwise negative    Renal US 7/31/2019 without obvious nidus of UTI    Cytology 7/10 was negative for malignancy    A/P: 69 year old F with OAB symptoms, urge incontinence, postural incontinence, and history of UTIs.    Continue post coital abx    Recommend daily fiber supplement for constipation    We discussed how " her pelvic floor symptoms are related to the physical exam findings and her pelvic floor myofascial dysfunction.  We discussed how the recommended treatment is dedicated pelvic floor therapy.  We discussed how the pelvic floor physical therapy works and patient is agreeable.  Referral was placed.      Cytology repeat today and if normal return 4 months for cystoscopy    Jennifer Oquendo MD MPH   of Urology    CC  Patient Care Team:  Sepideh Burris PA-C as PCP - General (Physician Assistant)  Sepideh Burris PA-C as Assigned PCP

## 2019-09-12 LAB — COPATH REPORT: NORMAL

## 2019-09-16 ENCOUNTER — HOSPITAL ENCOUNTER (OUTPATIENT)
Dept: ULTRASOUND IMAGING | Facility: CLINIC | Age: 69
Discharge: HOME | End: 2019-09-16
Attending: PHYSICIAN ASSISTANT | Admitting: PHYSICIAN ASSISTANT
Payer: COMMERCIAL

## 2019-09-16 ENCOUNTER — TELEPHONE (OUTPATIENT)
Dept: UROLOGY | Facility: CLINIC | Age: 69
End: 2019-09-16

## 2019-09-16 DIAGNOSIS — E04.2 NONTOXIC MULTINODULAR GOITER: ICD-10-CM

## 2019-09-16 PROCEDURE — 76536 US EXAM OF HEAD AND NECK: CPT

## 2019-09-16 NOTE — TELEPHONE ENCOUNTER
Called Angeli with the results as below. She expressed understanding.  DOROTHY Gusman RN      ----- Message from Jennifer Oquendo MD sent at 9/16/2019  1:08 PM CDT -----  Ms Jacobson    Good news the urine test is negative for cancer cells    Otoniel Oquendo MD

## 2019-09-17 ENCOUNTER — MYC MEDICAL ADVICE (OUTPATIENT)
Dept: FAMILY MEDICINE | Facility: CLINIC | Age: 69
End: 2019-09-17

## 2019-09-17 ENCOUNTER — HOSPITAL ENCOUNTER (EMERGENCY)
Facility: CLINIC | Age: 69
Discharge: HOME | End: 2019-09-18
Attending: PHYSICIAN ASSISTANT | Admitting: PHYSICIAN ASSISTANT
Payer: COMMERCIAL

## 2019-09-17 DIAGNOSIS — E04.2 NONTOXIC MULTINODULAR GOITER: ICD-10-CM

## 2019-09-17 DIAGNOSIS — R04.0 EPISTAXIS: ICD-10-CM

## 2019-09-17 DIAGNOSIS — E04.2 NONTOXIC MULTINODULAR GOITER: Primary | ICD-10-CM

## 2019-09-17 LAB
T4 FREE SERPL-MCNC: 0.72 NG/DL (ref 0.76–1.46)
TSH SERPL DL<=0.005 MIU/L-ACNC: 10.78 MU/L (ref 0.4–4)

## 2019-09-17 PROCEDURE — 36415 COLL VENOUS BLD VENIPUNCTURE: CPT | Performed by: PHYSICIAN ASSISTANT

## 2019-09-17 PROCEDURE — 84443 ASSAY THYROID STIM HORMONE: CPT | Performed by: PHYSICIAN ASSISTANT

## 2019-09-17 PROCEDURE — 99282 EMERGENCY DEPT VISIT SF MDM: CPT

## 2019-09-17 PROCEDURE — 84439 ASSAY OF FREE THYROXINE: CPT | Performed by: PHYSICIAN ASSISTANT

## 2019-09-17 RX ORDER — LIDOCAINE HYDROCHLORIDE AND EPINEPHRINE 10; 10 MG/ML; UG/ML
INJECTION, SOLUTION INFILTRATION; PERINEURAL
Status: DISCONTINUED
Start: 2019-09-17 | End: 2019-09-18 | Stop reason: HOSPADM

## 2019-09-17 RX ORDER — LEVOTHYROXINE SODIUM 25 UG/1
25 TABLET ORAL DAILY
Qty: 90 TABLET | Refills: 0 | Status: SHIPPED | OUTPATIENT
Start: 2019-09-17 | End: 2019-09-19

## 2019-09-17 RX ORDER — TRANEXAMIC ACID 100 MG/ML
500 INJECTION, SOLUTION INTRAVENOUS ONCE
Status: DISCONTINUED | OUTPATIENT
Start: 2019-09-17 | End: 2019-09-18 | Stop reason: HOSPADM

## 2019-09-17 RX ORDER — OXYMETAZOLINE HYDROCHLORIDE 0.05 G/100ML
2 SPRAY NASAL ONCE
Status: DISCONTINUED | OUTPATIENT
Start: 2019-09-17 | End: 2019-09-18 | Stop reason: HOSPADM

## 2019-09-17 NOTE — RESULT ENCOUNTER NOTE
Thyroid appears to be failing. This is the second most common glandular disease, more common in women. We should treat it, and check lab in 2 months, and get this adjusted. You will have more energy. I sent the prescription to your long distance pharmacy in [place].  JOMAR HARGROVE  Please verify this is read  BW

## 2019-09-17 NOTE — ED AVS SNAPSHOT
[facility] Emergency Department  [address]  [facility] [address]  Phone:  [phone]  Fax:  [phone]                                    Angeli Jacobson   MRN: [id number]    Department:  [facility] Emergency Department   Date of Visit:  9/17/2019           After Visit Summary Signature Page    I have received my discharge instructions, and my questions have been answered. I have discussed any challenges I see with this plan with the nurse or doctor.    ..........................................................................................................................................  Patient/Patient Representative Signature      ..........................................................................................................................................  Patient Representative Print Name and Relationship to Patient    ..................................................               ................................................  Date                                   Time    ..........................................................................................................................................  Reviewed by Signature/Title    ...................................................              ..............................................  Date                                               Time          22EPIC Rev 08/18

## 2019-09-18 VITALS
OXYGEN SATURATION: 98 % | TEMPERATURE: 98 F | RESPIRATION RATE: 18 BRPM | SYSTOLIC BLOOD PRESSURE: 142 MMHG | DIASTOLIC BLOOD PRESSURE: 84 MMHG

## 2019-09-18 NOTE — ED PROVIDER NOTES
History     Chief Complaint:  Epistaxis    HPI   Angeli Jacobson is an anticoagulated 69 year old female on Plavix, who presents with epistaxis beginning at 8:50 PM tonight. To note, the patient reports a history of nasal turbinectomy 3 years ago and states that since then her nares sometimes get irritated and mucous gets stuck, so she has to blow her nose to clear this. Tonight, she states that she was blowing her nose when it began bleeding. She states this has never happened in the past and denies history of nose bleeds.     Allergies:  Celexa     Medications:    Fosamax  Aspirin   Plavix  Zetia  Synthroid  Antivert  Zofran  Ditropan  Crestor  Effexor     Past Medical History:    Central serous retinopathy  Chronic neck pain  Depressive disorder  HLD  Myxoid Liposarcoma  Nontoxic multinodular goiter  Osteoporosis   PAD  PONV  Hearing loss  Lymphedema   Vascular graft occlusion  OAB    Past Surgical History:    Bypass graft femoropopliteal  Appendectomy   Biopsy left thigh  Colonoscopy  Cystoscopy  Excision malig lesion  Explore groin  Colonoscopy   D&C  Hip surgery  Angiogram through catheter  Left SFA stent  Nasal turbinectomy    Family History:    CAD  Alcohol/drug  Obesity  Osteoporosis   Colon cancer  HLD  Diabetes    Social History:  Smoking status: no   Alcohol use: no   Drug use: no   PCP: Sepideh Burris  Marital Status:        Review of Systems   HENT: Positive for nosebleeds.    All other systems reviewed and are negative.      Physical Exam     Patient Vitals for the past 24 hrs:   BP Temp Temp src Heart Rate Resp SpO2   09/17/19 2152 (!) 147/82 98  F (36.7  C) Temporal 86 18 98 %        Physical Exam   General: Alert, interactive.  Head:  Scalp is atraumatic.  Eyes:   No scleral icterus.   ENT:                                      Ears:  The external ears are normal. TM's non-erythematous. External canals normal.    Nose:  The external nose is normal. Left nares actively bleeding.  Unable to visualize location of bleeding. No septal hematoma.   Throat:  The oropharynx is normal. Mucus membranes are moist.                 Neck:  Normal range of motion. There is no rigidity.   CV:  Regular rate and rhythm. No murmur. 2+ radial pulses  Resp:  Breath sounds are clear bilaterally. Non-labored, no retractions or accessory muscle use.  MS:  Normal range of motion.   Skin:  Warm and dry.   Neuro:  Strength and sensation grossly intact.   Psych:  Awake. Alert.  Appropriate interactions.     Emergency Department Course     Interventions:  Afrin 2 sprays nasal  Tranexamic acid 500 mg spray     Emergency Department Course:  2230: Nursing notes and vitals reviewed. I performed an exam of the patient as documented above.     Medicine administered as documented above.     2300: I rechecked the patient and placed TXA, lidocaine w/ epinephrine, and afrin soaked cotton ball up the left nares     0007: I rechecked the patient. Bleeding as resolved.      Findings and plan explained to the patient. Patient discharged home with instructions regarding supportive care, medications, and reasons to return. The importance of close follow-up was reviewed.     I personally answered all related questions prior to discharge.     Impression & Plan      Medical Decision Making:  Angeli Jacobson is a 69 year old female who presents for evaluation of nasal bleeding and epistaxis.  The bleeding was controlled with interventions in the ED (lidocaine w/ epinephrine, Afrin, and TXA soaked cotton ball) and therefore supportive outpatient management is indicated.  Close follow-up with ENT and primary care for recurrent nose bleeds.  There are no signs of coagulopathy causing the bleeding or a general medical condition (thrombocytopenia, DIC, leukemia, etc) causing the bleeding today..  Discussed with patient to use humidity and vaseline or bacitracin in nares bid for the next week.  Patient agrees with the plan and all  questions/concerns addressed prior to discharge home.        Diagnosis:    ICD-10-CM    1. Epistaxis R04.0        Disposition:  discharged to home    I, Cookie Souza, am serving as a scribe at 10:30 PM on 9/17/2019 to document services personally performed by Elizabeth Ruelas PA-C based on my observations and the provider's statements to me.       Cookie Souza  9/17/2019   [facility] EMERGENCY DEPARTMENT       Elizabeth Ruelas PA-C  09/18/19 1051     none

## 2019-09-18 NOTE — TELEPHONE ENCOUNTER
Called Angeli back to f/u on her question. Left message on identified VM.  Per Dr. Oquendo, it's fine for Angeli to stay on the oxybutynin. Pt's next appt is scheduled for 1/28/2020 and this can be re-evaluated then.   Also, she's encouraged to proceed with the physical therapy for her pelvic floor dysfunction that Dr. Oquendo advised. Provided pt with phone # for [facility] to schedule this.

## 2019-09-18 NOTE — DISCHARGE INSTRUCTIONS
"*Avoid picking your nose or blowing it hard.  *No new medications. Afrin if nose starts bleeding and nasal clamp for 15 minutes.   *Follow-up with ENT in the next 1-2 weeks for recurrent nosebleeds.  *Return if you develop recurrence, faint or feel like you will faint or become worse in any way.      Discharge Instructions  Epistaxis    Today you were seen for a nosebleed.   Nosebleeds (the medical term is \"epistaxis\") are very common. Almost every person has had at least one in their lifetime.  Although the amount of blood loss can appear dramatic, nosebleeds rarely cause serious problems.  The most common causes are dry air or nose picking, but they also are common in people who have allergies, high blood pressure or are on blood thinners, such as Coumadin, Aspirin or Plavix. If you or your child gets a nosebleed, the important thing is to know how to take care of it. With the right self-care, most nosebleeds will stop on their own.    Return to the Emergency Department if:  Your nose is bleeding a very large amount of blood.  You get very pale, faint, or tired.  You cannot get the bleeding to stop after following these instructions.  A nosebleed happens after recent nasal surgery or if you have a known nasal tumor.  You have new, serious symptoms, such as chest pain.  You get a nosebleed after an injury, such as being hit in the face, and you are concerned that you could have other injuries (like a broken bone).    Treatment:  Your doctor may tell you to use a decongestant nose spray, like Afrin  (oxymetazoline), in both nostrils in the morning and at night for the next three days. Do not use this medicine for more than three days at a time.  If you do it will cause nasal congestion.   You should apply a small amount of Vaseline  to the inside of your nostrils for moisture before bed.  Using a humidifier in your bedroom will help as well.  For the next three days, do not blow your nose or put anything in your " nose. You may sniffle, or dab the outside of your nose.  Do not bend with your head below your waist for the next three days. Do not lift anything so heavy that you have to strain.   If you received nasal packing, please do not remove the packing until seen by an Ear Nose and Throat specialist.  If antibiotics have been given with the packing please take as directed.    If your nose starts to bleed again:  Blow your nose to get rid of some of the clots that have formed inside your nostrils. This may increase the bleeding temporarily, but that's OK.  Spray decongestant nose spray (like Afrin ) into both nostrils to constrict the blood vessels.  Sit or stand while bending forward slightly at the waist. Do not lie down or tilt your head back. This may cause you to swallow blood and can lead to vomiting.   the soft part of BOTH nostrils at the bottom of your nose and squeeze your nose closed for at least 5 minutes (for children) or 10 to 15 minutes (for adults). You can use your fingers, or the clip we gave you here. Use a clock to time yourself. Do not release the pressure every so often to check whether the bleeding has stopped. Many people hurt their chances of stopping the bleeding by releasing the pressure too soon or too often.    If you follow the steps outlined above, and your nose continues to bleed, repeat all the steps once more. Apply pressure for a total of at least 30 minutes. If you continue to bleed even then, return to the Emergency Department or go to an urgent care clinic.    If you keep having nose bleeds, schedule an appointment with your regular doctor, or with an Ear, Nose, and Throat Specialist.  If you were given a prescription for medicine here today, be sure to read all of the information (including the package insert) that comes with your prescription.  This will include important information about the medicine, its side effects, and any warnings that you need to know about.  The  pharmacist who fills the prescription can provide more information and answer questions you may have about the medicine.  If you have questions or concerns that the pharmacist cannot address, please call or return to the Emergency Department.   Opioid Medication Information    Pain medications are among the most commonly prescribed medicines, so we are including this information for all our patients. If you did not receive pain medication or get a prescription for pain medicine, you can ignore it.     You may have been given a prescription for an opioid (narcotic) pain medicine and/or have received a pain medicine while here in the Emergency Department. These medicines can make you drowsy or impaired. You must not drive, operate dangerous equipment, or engage in any other dangerous activities while taking these medications. If you drive while taking these medications, you could be arrested for DUI, or driving under the influence. Do not drink any alcohol while you are taking these medications.     Opioid pain medications can cause addiction. If you have a history of chemical dependency of any type, you are at a higher risk of becoming addicted to pain medications.  Only take these prescribed medications to treat your pain when all other options have been tried. Take it for as short a time and as few doses as possible. Store your pain pills in a secure place, as they are frequently stolen and provide a dangerous opportunity for children or visitors in your house to start abusing these powerful medications. We will not replace any lost or stolen medicine.  As soon as your pain is better, you should flush all your remaining medication.     Many prescription pain medications contain Tylenol  (acetaminophen), including Vicodin , Tylenol #3 , Norco , Lortab , and Percocet .  You should not take any extra pills of Tylenol  if you are using these prescription medications or you can get very sick.  Do not ever take more than  3000 mg of acetaminophen in any 24 hour period.    All opioids tend to cause constipation. Drink plenty of water and eat foods that have a lot of fiber, such as fruits, vegetables, prune juice, apple juice and high fiber cereal.  Take a laxative if you don t move your bowels at least every other day. Miralax , Milk of Magnesia, Colace , or Senna  can be used to keep you regular.      Remember that you can always come back to the Emergency Department if you are not able to see your regular doctor in the amount of time listed above, if you get any new symptoms, or if there is anything that worries you.

## 2019-09-19 ENCOUNTER — MYC MEDICAL ADVICE (OUTPATIENT)
Dept: FAMILY MEDICINE | Facility: CLINIC | Age: 69
End: 2019-09-19

## 2019-09-19 DIAGNOSIS — E04.2 NONTOXIC MULTINODULAR GOITER: ICD-10-CM

## 2019-09-19 RX ORDER — LEVOTHYROXINE SODIUM 25 UG/1
25 TABLET ORAL DAILY
Qty: 90 TABLET | Refills: 0 | Status: SHIPPED | OUTPATIENT
Start: 2019-09-19 | End: 2019-11-12

## 2019-09-20 ENCOUNTER — TELEPHONE (OUTPATIENT)
Dept: OTHER | Facility: CLINIC | Age: 69
End: 2019-09-20

## 2019-09-20 NOTE — TELEPHONE ENCOUNTER
Patient called to schedule an appointment with Dr García for Left Leg Lymphedema.  She was given Dr García's name from her PCP Dr Sepideh Burris. She is currently a patient of Dr Aguiars. She has had lymphedema since 2000 and now the pain is increasing. Angeli can be reached at [phone].

## 2019-09-20 NOTE — TELEPHONE ENCOUNTER
Patient is scheduled for a Consult Appointment with Dr Prince on 10/10/19. She did not want to wait until 10/29/19 for Dr García's first available appointment.

## 2019-09-24 ENCOUNTER — TRANSFERRED RECORDS (OUTPATIENT)
Dept: HEALTH INFORMATION MANAGEMENT | Facility: CLINIC | Age: 69
End: 2019-09-24

## 2019-09-26 ENCOUNTER — MYC MEDICAL ADVICE (OUTPATIENT)
Dept: SURGERY | Facility: CLINIC | Age: 69
End: 2019-09-26

## 2019-09-26 ENCOUNTER — HOSPITAL ENCOUNTER (OUTPATIENT)
Dept: LAB | Facility: CLINIC | Age: 69
Discharge: HOME | End: 2019-09-26
Attending: ORTHOPAEDIC SURGERY | Admitting: ORTHOPAEDIC SURGERY
Payer: COMMERCIAL

## 2019-09-26 DIAGNOSIS — Z01.812 PRE-OPERATIVE LABORATORY EXAMINATION: ICD-10-CM

## 2019-09-26 LAB
MRSA DNA SPEC QL NAA+PROBE: NEGATIVE
SPECIMEN SOURCE: NORMAL

## 2019-09-26 PROCEDURE — 87641 MR-STAPH DNA AMP PROBE: CPT | Performed by: ORTHOPAEDIC SURGERY

## 2019-09-26 PROCEDURE — 40000829 ZZHCL STATISTIC STAPH AUREUS SUSCEPT SCREEN PCR: Performed by: ORTHOPAEDIC SURGERY

## 2019-09-26 PROCEDURE — 40000830 ZZHCL STATISTIC STAPH AUREUS METH RESIST SCREEN PCR: Performed by: ORTHOPAEDIC SURGERY

## 2019-09-26 PROCEDURE — 87640 STAPH A DNA AMP PROBE: CPT | Performed by: ORTHOPAEDIC SURGERY

## 2019-09-26 NOTE — PROGRESS NOTES
Pt called and stated she was noting increased pain and had a follow up with MD.  After doing x-rays it was discovered the fracture was not fully healed and hardware failure.  Pt is to have surgical intervention.  discharge from PT at this time and will need to be evaluated if she returns.

## 2019-09-27 ENCOUNTER — OFFICE VISIT (OUTPATIENT)
Dept: FAMILY MEDICINE | Facility: CLINIC | Age: 69
End: 2019-09-27
Payer: COMMERCIAL

## 2019-09-27 VITALS
WEIGHT: 155 LBS | TEMPERATURE: 97.7 F | SYSTOLIC BLOOD PRESSURE: 128 MMHG | BODY MASS INDEX: 25.79 KG/M2 | RESPIRATION RATE: 16 BRPM | DIASTOLIC BLOOD PRESSURE: 72 MMHG | HEART RATE: 71 BPM

## 2019-09-27 DIAGNOSIS — Z23 NEED FOR PROPHYLACTIC VACCINATION AND INOCULATION AGAINST INFLUENZA: ICD-10-CM

## 2019-09-27 DIAGNOSIS — Z01.818 PREOP GENERAL PHYSICAL EXAM: Primary | ICD-10-CM

## 2019-09-27 DIAGNOSIS — R79.89 ELEVATED TSH: ICD-10-CM

## 2019-09-27 DIAGNOSIS — Z79.01 LONG TERM CURRENT USE OF ANTICOAGULANT THERAPY: ICD-10-CM

## 2019-09-27 DIAGNOSIS — E03.8 SUBCLINICAL HYPOTHYROIDISM: ICD-10-CM

## 2019-09-27 LAB
ALBUMIN SERPL-MCNC: 3.9 G/DL (ref 3.4–5)
ALP SERPL-CCNC: 81 U/L (ref 40–150)
ALT SERPL W P-5'-P-CCNC: 29 U/L (ref 0–50)
ANION GAP SERPL CALCULATED.3IONS-SCNC: 5 MMOL/L (ref 3–14)
AST SERPL W P-5'-P-CCNC: 19 U/L (ref 0–45)
BILIRUB SERPL-MCNC: 0.3 MG/DL (ref 0.2–1.3)
BUN SERPL-MCNC: 14 MG/DL (ref 7–30)
CALCIUM SERPL-MCNC: 9.5 MG/DL (ref 8.5–10.1)
CHLORIDE SERPL-SCNC: 106 MMOL/L (ref 94–109)
CO2 SERPL-SCNC: 29 MMOL/L (ref 20–32)
CREAT SERPL-MCNC: 0.55 MG/DL (ref 0.52–1.04)
ERYTHROCYTE [DISTWIDTH] IN BLOOD BY AUTOMATED COUNT: 13.9 % (ref 10–15)
GFR SERPL CREATININE-BSD FRML MDRD: >90 ML/MIN/{1.73_M2}
GLUCOSE SERPL-MCNC: 91 MG/DL (ref 70–99)
HCT VFR BLD AUTO: 39.4 % (ref 35–47)
HGB BLD-MCNC: 12.8 G/DL (ref 11.7–15.7)
INR PPP: 1.01 (ref 0.86–1.14)
MCH RBC QN AUTO: 29.9 PG (ref 26.5–33)
MCHC RBC AUTO-ENTMCNC: 32.5 G/DL (ref 31.5–36.5)
MCV RBC AUTO: 92 FL (ref 78–100)
PLATELET # BLD AUTO: 280 10E9/L (ref 150–450)
POTASSIUM SERPL-SCNC: 3.9 MMOL/L (ref 3.4–5.3)
PROT SERPL-MCNC: 7.6 G/DL (ref 6.8–8.8)
RBC # BLD AUTO: 4.28 10E12/L (ref 3.8–5.2)
SODIUM SERPL-SCNC: 140 MMOL/L (ref 133–144)
WBC # BLD AUTO: 6.3 10E9/L (ref 4–11)

## 2019-09-27 PROCEDURE — 99214 OFFICE O/P EST MOD 30 MIN: CPT | Mod: 25 | Performed by: PHYSICIAN ASSISTANT

## 2019-09-27 PROCEDURE — 80053 COMPREHEN METABOLIC PANEL: CPT | Performed by: PHYSICIAN ASSISTANT

## 2019-09-27 PROCEDURE — 85610 PROTHROMBIN TIME: CPT | Performed by: PHYSICIAN ASSISTANT

## 2019-09-27 PROCEDURE — 93000 ELECTROCARDIOGRAM COMPLETE: CPT | Performed by: PHYSICIAN ASSISTANT

## 2019-09-27 PROCEDURE — 90471 IMMUNIZATION ADMIN: CPT | Performed by: PHYSICIAN ASSISTANT

## 2019-09-27 PROCEDURE — 85027 COMPLETE CBC AUTOMATED: CPT | Performed by: PHYSICIAN ASSISTANT

## 2019-09-27 PROCEDURE — 90662 IIV NO PRSV INCREASED AG IM: CPT | Performed by: PHYSICIAN ASSISTANT

## 2019-09-27 PROCEDURE — 36415 COLL VENOUS BLD VENIPUNCTURE: CPT | Performed by: PHYSICIAN ASSISTANT

## 2019-09-27 RX ORDER — AZELASTINE 1 MG/ML
1 SPRAY, METERED NASAL DAILY PRN
Refills: 10 | COMMUNITY
Start: 2019-09-24 | End: 2020-09-22

## 2019-09-27 RX ORDER — VENLAFAXINE HYDROCHLORIDE 37.5 MG/1
CAPSULE, EXTENDED RELEASE ORAL
Refills: 0 | COMMUNITY
Start: 2019-06-05 | End: 2019-09-30

## 2019-09-27 NOTE — PROGRESS NOTES
[facility]  [address] [facility] [address]  [phone]  Dept: [phone]    PRE-OP EVALUATION:  Today's date: 2019    Angeli Jacobson (: 1950) presents for pre-operative evaluation assessment as requested by Dr. Elijah Celeste.  She requires evaluation and anesthesia risk assessment prior to undergoing surgery/procedure for treatment of  Hardware removal hardware left hip and left total hip arthroplasty.    Fax number for surgical facility:   Primary Physician: Sepideh Burris  Type of Anesthesia Anticipated: Choice    Patient has a Health Care Directive or Living Will:  YES     Preop Questions 2019   Who is doing your surgery? Dr. Celeste   What are you having done? hardware removal left hip, left hip total srthoplasty   Date of Surgery/Procedure: 10/4/19   Facility or Hospital where procedure/surgery will be performed[facility]   1.  Do you have a history of Heart attack, stroke, stent, coronary bypass surgery, or other heart surgery? No   2.  Do you ever have any pain or discomfort in your chest? No   3.  Do you have a history of  Heart Failure? No   4.   Are you troubled by shortness of breath when:  walking on a level surface, or up a slight hill, or at night? No   5.  Do you currently have a cold, bronchitis or other respiratory infection? No   6.  Do you have a cough, shortness of breath, or wheezing? No   7.  Do you sometimes get pains in the calves of your legs when you walk? YES -    8. Do you or anyone in your family have previous history of blood clots? No   9.  Do you or does anyone in your family have a serious bleeding problem such as prolonged bleeding following surgeries or cuts? No   10. Have you ever had problems with anemia or been told to take iron pills? No   11. Have you had any abnormal blood loss such as black, tarry or bloody stools, or abnormal vaginal bleeding? No   12. Have you ever had a blood transfusion? No   13.  Have you or any of your relatives ever had problems with anesthesia? YES -nausea   14. Do you have sleep apnea, excessive snoring or daytime drowsiness? No   15. Do you have any prosthetic heart valves? No   16. Do you have prosthetic joints? No   17. Is there any chance that you may be pregnant? No         HPI:     HPI related to upcoming procedure: left hip pain, chronic. S/p ORIF left hip      History of PAD, s/p left femoral-popliteal bypass graft occlusion, embolism of artery of left lower extremity, h/o myxoid liposarcoma left lower extremity. Using Plavix and 81 mg ASA daily.       MEDICAL HISTORY:     Patient Active Problem List    Diagnosis Date Noted     Myalgia of pelvic floor 09/11/2019     Priority: Medium     Pelvic floor dysfunction 09/11/2019     Priority: Medium     Lesion of bladder 09/11/2019     Priority: Medium     Urge incontinence of urine 07/17/2019     Priority: Medium     Postural urinary incontinence 07/17/2019     Priority: Medium     Personal history of urinary tract infection 07/17/2019     Priority: Medium     OAB (overactive bladder) 07/17/2019     Priority: Medium     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) 06/14/2019     Priority: Medium     S/P ORIF (open reduction internal fixation) fracture 05/02/2019     Priority: Medium     Hip pain, left 05/02/2019     Priority: Medium     History of sarcoma 01/21/2019     Priority: Medium     Femoral-popliteal bypass graft occlusion, left (H) 12/19/2018     Priority: Medium     Vascular graft occlusion (H) 04/30/2018     Priority: Medium     PAD (peripheral artery disease) (H) 04/20/2018     Priority: Medium     Other constipation 06/27/2017     Priority: Medium     Vertigo 06/27/2017     Priority: Medium     Tubular adenoma 02/09/2016     Priority: Medium     Lymphedema of left leg 10/14/2014     Priority: Medium     Due to cancer       Major depressive disorder, recurrent episode, moderate (H) 06/23/2009     Priority: Medium      Cervicalgia 04/23/2009     Priority: Medium     Osteoporosis 06/19/2008     Priority: Medium     Problem list name updated by automated process. 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     Priority: Medium     CHRONIC LEFT THIGH LYMPHEDEMA       Impaired fasting glucose 06/16/2010     Priority: Low     Hearing loss 01/29/2007     Priority: Low     Has hearing aids       Pain in joint, multiple sites 10/27/2005     Priority: Low      Past Medical History:   Diagnosis Date     * * * SBE PROPHYLAXIS * * * 1998    Amox 500mg, take 4 tabs one hour prior to procedure.Takes this because of lymphedema secondary from leg surgey     Central serous retinopathy 2001    Resolved 9/2001     CHRONIC NECK PAIN 1995     Depressive disorder      Depressive disorder, not elsewhere classified 2001     History of blood transfusion 04/2019    during left hip pinning     MIXED HYPERLIPIDEMIA, LDL GOAL <160 1998    LDL goal < 160     Motion sickness      MYXOID LIPOSARCOMA 2000    Left thigh, S/P excision, radiation  at [facility]     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    CHRONIC LEFT THIGH LYMPHEDEMA     Nontoxic multinodular goiter 2005    needs yearly      Osteoporosis, unspecified 2001     Other lymphedema 2000    left thigh, gets regular PT for this     PAD (peripheral artery disease) (H) 4/20/2018     PONV (postoperative nausea and vomiting)      SHINGLES 2001     Sprain of lumbosacral (joint) (ligament) 1995    right     Unspecified  hearing loss 1998    chronic tinnitus     Unspecified tinnitus 1998     Past Surgical History:   Procedure Laterality Date     BYPASS GRAFT FEMOROPOPLITEAL Left 1/21/2019    Procedure: LEFT FEMORAL TO ABOVE KNEE POPLITEAL  BYPASS WITH POLYTETRAFLUOROETHYLENE GRAFT;  Surgeon: Shade Owens MD;  Location: [facility] OR     C APPENDECTOMY      Appendectomy     C NONSPECIFIC PROCEDURE  04/2000    Open Biopsy Left Thigh Liposarcoma     COLONOSCOPY N/A 2/5/2016    Procedure: COMBINED COLONOSCOPY, SINGLE OR MULTIPLE BIOPSY/POLYPECTOMY BY BIOPSY;  Surgeon: Varun Stanley MD, MD;  Location:  GI     CYSTOSCOPY       EXCISION MALIG LESION>1.25CM  5/2000    Myxoid Liposarcoma       EXPLORE GROIN Right 5/1/2018    Procedure: EXPLORE GROIN;  EMERGENCY RIGHT FEMORAL EXPLORATION WITH FEMORAL ARTERY REPAIR.    EBL: 50mL;  Surgeon: Shade Owens MD;  Location: [facility] OR     HC COLONOSCOPY THRU STOMA, DIAGNOSTIC  2006    due 2010     HC COLP CERVIX/UPPER VAGINA  07/1997    Negative     HC DILATION/CURETTAGE DIAG/THER NON OB  02/1997    Post menopausal bleeding on HRT, negative     HIP SURGERY Left 04/13/2019     IR ANGIOGRAM THROUGH CATHETER FOLLOW UP  12/20/2018     IR ANGIOGRAM THROUGH CATHETER FOLLOW UP  12/21/2018     IR LOWER EXTREMITY ANGIOGRAM LEFT  12/19/2018     VASCULAR SURGERY  04/30/2018    Left SFA stent in bypass graft     Current Outpatient Medications   Medication Sig Dispense Refill     Acetaminophen 325 MG CAPS Take 500 mg by mouth 2 times daily as needed        alendronate (FOSAMAX) 70 MG tablet Take 1 tablet (70 mg) by mouth with 8oz water every 7 days 30 minutes before breakfast and remain upright during this time. 12 tablet 3     aspirin (ASA) 81 MG chewable tablet Take 81 mg by mouth daily       CALCIUM PO Take 1 tablet by mouth daily       clopidogrel (PLAVIX) 75 MG tablet Take 1 tablet (75 mg) by mouth daily 90 tablet 3     ezetimibe (ZETIA) 10 MG tablet Take 1 tablet (10 mg) by mouth daily 90 tablet 3      levothyroxine (SYNTHROID/LEVOTHROID) 25 MCG tablet Take 1 tablet (25 mcg) by mouth daily 90 tablet 0     multivitamin, therapeutic (THERA-VIT) TABS tablet Take 1 tablet by mouth daily       nitroFURantoin macrocrystal-monohydrate (MACROBID) 100 MG capsule Take 1 tablet after intercourse 30 capsule 3     order for DME Equipment being ordered: Walker Wheels () and Walker ()  Treatment Diagnosis: difficulty walking 1 each 0     order for DME Equipment being ordered: Left Thigh High Compression Stocking, 550 CCL.3 1 each 99     ORDER FOR DME Equipment being ordered: lymphedema bandages 2 Device 1     oxybutynin ER (DITROPAN-XL) 5 MG 24 hr tablet Take 1 tablet (5 mg) by mouth daily 90 tablet 1     rosuvastatin (CRESTOR) 40 MG tablet Take 1 tablet (40 mg) by mouth every evening 90 tablet 3     OTC products: Aspirin    Allergies   Allergen Reactions     Celexa [Citalopram Hydrobromide]      Decreased libido      Latex Allergy: NO    Social History     Tobacco Use     Smoking status: Never Smoker     Smokeless tobacco: Never Used   Substance Use Topics     Alcohol use: No     Frequency: Monthly or less     Drinks per session: 1 or 2     Binge frequency: Never     History   Drug Use No       REVIEW OF SYSTEMS:   Constitutional, neuro, ENT, endocrine, pulmonary, cardiac, gastrointestinal, genitourinary, musculoskeletal, integument and psychiatric systems are negative, except as otherwise noted.    EXAM:   LMP 03/18/2005    /72 (BP Location: Right arm, Patient Position: Sitting, Cuff Size: Adult Regular)   Pulse 71   Temp 97.7  F (36.5  C) (Oral)   Resp 16   Wt 70.3 kg (155 lb)   [facility] 03/18/2005   BMI 25.79 kg/m        GENERAL APPEARANCE: healthy, alert and no distress     EYES: EOMI, PERRL     HENT: ear canals and TM's normal and nose and mouth without ulcers or lesions     NECK: no adenopathy, no asymmetry, masses, or scars and thyroid normal to palpation     RESP: lungs clear to auscultation - no rales,  rhonchi or wheezes     CV: regular rates and rhythm, normal S1 S2, no S3 or S4 and no murmur, click or rub     ABDOMEN:  soft, nontender, no HSM or masses and bowel sounds normal     MS: extremities normal- no gross deformities noted, no evidence of inflammation in joints, FROM in all extremities.     SKIN: no suspicious lesions or rashes     NEURO: Normal strength and tone, sensory exam grossly normal, mentation intact and speech normal     PSYCH: mentation appears normal. and affect normal/bright     LYMPHATICS: No cervical adenopathy    DIAGNOSTICS:   EKG: appears normal, NSR, normal axis, normal intervals, no acute ST/T changes c/w ischemia, no LVH by voltage criteria, unchanged from previous tracings    Results for orders placed or performed in visit on 09/27/19   CBC with platelets   Result Value Ref Range    WBC 6.3 4.0 - 11.0 10e9/L    RBC Count 4.28 3.8 - 5.2 10e12/L    Hemoglobin 12.8 11.7 - 15.7 g/dL    Hematocrit 39.4 35.0 - 47.0 %    MCV 92 78 - 100 fl    MCH 29.9 26.5 - 33.0 pg    MCHC 32.5 31.5 - 36.5 g/dL    RDW 13.9 10.0 - 15.0 %    Platelet Count 280 150 - 450 10e9/L   Comprehensive metabolic panel   Result Value Ref Range    Sodium 140 133 - 144 mmol/L    Potassium 3.9 3.4 - 5.3 mmol/L    Chloride 106 94 - 109 mmol/L    Carbon Dioxide 29 20 - 32 mmol/L    Anion Gap 5 3 - 14 mmol/L    Glucose 91 70 - 99 mg/dL    Urea Nitrogen 14 7 - 30 mg/dL    Creatinine 0.55 0.52 - 1.04 mg/dL    GFR Estimate >90 >60 mL/min/[1.73_m2]    GFR Estimate If Black >90 >60 mL/min/[1.73_m2]    Calcium 9.5 8.5 - 10.1 mg/dL    Bilirubin Total 0.3 0.2 - 1.3 mg/dL    Albumin 3.9 3.4 - 5.0 g/dL    Protein Total 7.6 6.8 - 8.8 g/dL    Alkaline Phosphatase 81 40 - 150 U/L    ALT 29 0 - 50 U/L    AST 19 0 - 45 U/L         Recent Labs   Lab Test 06/04/19  1530 04/30/19  1243 01/31/19  1058  01/17/19  0904  12/19/18  1241  05/01/18  1700   HGB 14.1  --  12.1   < >  --    < >  --    < > 9.5*     --  471*   < >  --    < >  --     < > 161   INR  --   --   --   --   --   --  0.97  --  1.43*    140 136   < >  --    < >  --    < >  --    POTASSIUM 3.7 4.2 4.1   < >  --    < >  --    < >  --    CR 0.55 0.64 0.70   < >  --    < >  --    < >  --    A1C  --   --   --   --  5.5  --  5.8*  --   --     < > = values in this interval not displayed.        IMPRESSION:   Reason for surgery/procedure: Hardware removal hardware left hip and left total hip arthroplasty  Diagnosis/reason for consult: preop    The proposed surgical procedure is considered INTERMEDIATE risk.    REVISED CARDIAC RISK INDEX  The patient has the following serious cardiovascular risks for perioperative complications such as (MI, PE, VFib and 3  AV Block):  PAD  INTERPRETATION: 1 risks: Class II (low risk - 0.9% complication rate)    The patient has the following additional risks for perioperative complications:  No identified additional risks      ICD-10-CM    1. Preop general physical exam Z01.818 EKG 12-lead complete w/read - Clinics       RECOMMENDATIONS:           Anticoagulant or Antiplatelet Medication Use  ASPIRIN: Discontinue ASA 5 days prior to procedure to reduce bleeding risk.  It should be resumed post-operatively.  PLAVIX: No contraindication to stopping plavix.  Stop 5 days prior to surgery per vascular specialist        APPROVAL GIVEN to proceed with proposed procedure, without further diagnostic evaluation       Signed Electronically by: Sepideh Burris PA-C    Copy of this evaluation report is provided to requesting physician.    [facility] Preop Guidelines    Revised Cardiac Risk Index

## 2019-09-27 NOTE — TELEPHONE ENCOUNTER
Verbal order given by Dr. Galdamez for pt to hold Plavix and ASA 5 days prior to hip replacement.  Tixers message sent to patient.  Nicol Buenrostro, CHERISEN, RN

## 2019-09-30 ENCOUNTER — ANCILLARY PROCEDURE (OUTPATIENT)
Dept: BONE DENSITY | Facility: CLINIC | Age: 69
End: 2019-09-30
Payer: COMMERCIAL

## 2019-09-30 ENCOUNTER — HOSPITAL ENCOUNTER (OUTPATIENT)
Dept: MAMMOGRAPHY | Facility: CLINIC | Age: 69
Discharge: HOME | End: 2019-09-30
Attending: PHYSICIAN ASSISTANT | Admitting: PHYSICIAN ASSISTANT
Payer: COMMERCIAL

## 2019-09-30 ENCOUNTER — HEALTH MAINTENANCE LETTER (OUTPATIENT)
Age: 69
End: 2019-09-30

## 2019-09-30 ENCOUNTER — ANCILLARY PROCEDURE (OUTPATIENT)
Dept: BONE DENSITY | Facility: CLINIC | Age: 69
End: 2019-09-30
Attending: PHYSICIAN ASSISTANT
Payer: COMMERCIAL

## 2019-09-30 DIAGNOSIS — Z12.31 VISIT FOR SCREENING MAMMOGRAM: ICD-10-CM

## 2019-09-30 DIAGNOSIS — M81.0 AGE-RELATED OSTEOPOROSIS WITHOUT CURRENT PATHOLOGICAL FRACTURE: ICD-10-CM

## 2019-09-30 DIAGNOSIS — Z78.0 ASYMPTOMATIC MENOPAUSAL STATE: ICD-10-CM

## 2019-09-30 PROCEDURE — 77081 DXA BONE DENSITY APPENDICULR: CPT | Performed by: INTERNAL MEDICINE

## 2019-09-30 PROCEDURE — 77063 BREAST TOMOSYNTHESIS BI: CPT

## 2019-09-30 PROCEDURE — 77080 DXA BONE DENSITY AXIAL: CPT | Mod: 59 | Performed by: INTERNAL MEDICINE

## 2019-09-30 NOTE — PHARMACY-ADMISSION MEDICATION HISTORY
Medication reconciliation interview completed by pre-admitting nurse Patti Faustin, reviewed by pharmacy. Removed effexor per RN flag - pt not taking. No further clarifications needed.       Prior to Admission medications    Medication Sig Last Dose Taking? Auth Provider   Acetaminophen 325 MG CAPS Take 500 mg by mouth 2 times daily as needed   Yes Reported, Patient   alendronate (FOSAMAX) 70 MG tablet Take 1 tablet (70 mg) by mouth with 8oz water every 7 days 30 minutes before breakfast and remain upright during this time.  Yes Sepideh Burris PA-C   aspirin (ASA) 81 MG chewable tablet Take 81 mg by mouth daily  Yes Reported, Patient   CALCIUM PO Take 1 tablet by mouth daily  Yes Reported, Patient   clopidogrel (PLAVIX) 75 MG tablet Take 1 tablet (75 mg) by mouth daily  Yes Shade Owens MD   ezetimibe (ZETIA) 10 MG tablet Take 1 tablet (10 mg) by mouth daily  Yes Sepideh Burris PA-C   levothyroxine (SYNTHROID/LEVOTHROID) 25 MCG tablet Take 1 tablet (25 mcg) by mouth daily  Yes Sepideh Burris PA-C   multivitamin, therapeutic (THERA-VIT) TABS tablet Take 1 tablet by mouth daily  Yes Reported, Patient   nitroFURantoin macrocrystal-monohydrate (MACROBID) 100 MG capsule Take 1 tablet after intercourse  Yes Sepideh Burris PA-C   oxybutynin ER (DITROPAN-XL) 5 MG 24 hr tablet Take 1 tablet (5 mg) by mouth daily  Yes Sepideh Burris PA-C   rosuvastatin (CRESTOR) 40 MG tablet Take 1 tablet (40 mg) by mouth every evening  Yes Sepideh Burris PA-C   azelastine (ASTELIN) 0.1 % nasal spray Spray 1 spray in nostril daily as needed    Reported, Patient   order for DME Equipment being ordered: Walker Wheels () and Walker ()  Treatment Diagnosis: difficulty walking   Shade Owens MD   order for DME Equipment being ordered: Left Thigh High Compression Stocking, 550 CCL.3   Yanelis Bailey MD   ORDER FOR DME Equipment being ordered: lymphedema bandages   Yanelis Bailey MD

## 2019-10-02 ENCOUNTER — TELEPHONE (OUTPATIENT)
Dept: UROLOGY | Facility: CLINIC | Age: 69
End: 2019-10-02

## 2019-10-02 NOTE — TELEPHONE ENCOUNTER
Called Angeli back with the answer to her question as below. Message left on identified VMJin Gusman RN      Message   Received: Yesterday   Message Contents   Jennifer Oquendo See MD Geena Marx Shannon M; Jennifer Gusman RN   Caller: Unspecified (2 days ago, 12:10 PM)            She had a red patch in her bladder that I want to make sure is resolved.            [facility]    Phone Message    May a detailed message be left on voicemail: yes    Reason for Call: Other: Patient called with questions about her January appointment she said why is she having a CYSTO/ Patient had a CYSTO 09/11/19 per pt. Please call to inform     Action Taken: Other: [facility] urology

## 2019-10-04 ENCOUNTER — ANESTHESIA (OUTPATIENT)
Dept: SURGERY | Facility: CLINIC | Age: 69
DRG: 470 | End: 2019-10-04
Payer: COMMERCIAL

## 2019-10-04 ENCOUNTER — HOSPITAL ENCOUNTER (INPATIENT)
Facility: CLINIC | Age: 69
LOS: 3 days | Discharge: HOME | DRG: 470 | End: 2019-10-07
Attending: ORTHOPAEDIC SURGERY | Admitting: ORTHOPAEDIC SURGERY
Payer: COMMERCIAL

## 2019-10-04 ENCOUNTER — APPOINTMENT (OUTPATIENT)
Dept: GENERAL RADIOLOGY | Facility: CLINIC | Age: 69
DRG: 470 | End: 2019-10-04
Attending: ORTHOPAEDIC SURGERY
Payer: COMMERCIAL

## 2019-10-04 ENCOUNTER — APPOINTMENT (OUTPATIENT)
Dept: PHYSICAL THERAPY | Facility: CLINIC | Age: 69
DRG: 470 | End: 2019-10-04
Attending: ORTHOPAEDIC SURGERY
Payer: COMMERCIAL

## 2019-10-04 ENCOUNTER — ANESTHESIA EVENT (OUTPATIENT)
Dept: SURGERY | Facility: CLINIC | Age: 69
DRG: 470 | End: 2019-10-04
Payer: COMMERCIAL

## 2019-10-04 DIAGNOSIS — Z96.642 STATUS POST TOTAL REPLACEMENT OF LEFT HIP: Primary | ICD-10-CM

## 2019-10-04 PROBLEM — Z96.649 S/P TOTAL HIP ARTHROPLASTY: Status: ACTIVE | Noted: 2019-10-04

## 2019-10-04 LAB
ABO + RH BLD: NORMAL
ABO + RH BLD: NORMAL
BLD GP AB SCN SERPL QL: NORMAL
BLOOD BANK CMNT PATIENT-IMP: NORMAL
CREAT SERPL-MCNC: 0.5 MG/DL (ref 0.52–1.04)
GFR SERPL CREATININE-BSD FRML MDRD: >90 ML/MIN/{1.73_M2}
PLATELET # BLD AUTO: 217 10E9/L (ref 150–450)
SPECIMEN EXP DATE BLD: NORMAL

## 2019-10-04 PROCEDURE — 0QP704Z REMOVAL OF INTERNAL FIXATION DEVICE FROM LEFT UPPER FEMUR, OPEN APPROACH: ICD-10-PCS | Performed by: ORTHOPAEDIC SURGERY

## 2019-10-04 PROCEDURE — 88311 DECALCIFY TISSUE: CPT | Performed by: ORTHOPAEDIC SURGERY

## 2019-10-04 PROCEDURE — 88311 DECALCIFY TISSUE: CPT | Mod: 26 | Performed by: ORTHOPAEDIC SURGERY

## 2019-10-04 PROCEDURE — 25000132 ZZH RX MED GY IP 250 OP 250 PS 637: Performed by: ORTHOPAEDIC SURGERY

## 2019-10-04 PROCEDURE — 27211024 ZZHC OR SUPPLY OTHER OPNP: Performed by: ORTHOPAEDIC SURGERY

## 2019-10-04 PROCEDURE — 0SRB0JA REPLACEMENT OF LEFT HIP JOINT WITH SYNTHETIC SUBSTITUTE, UNCEMENTED, OPEN APPROACH: ICD-10-PCS | Performed by: ORTHOPAEDIC SURGERY

## 2019-10-04 PROCEDURE — 25800030 ZZH RX IP 258 OP 636: Performed by: ORTHOPAEDIC SURGERY

## 2019-10-04 PROCEDURE — 25000128 H RX IP 250 OP 636: Performed by: ANESTHESIOLOGY

## 2019-10-04 PROCEDURE — 71000013 ZZH RECOVERY PHASE 1 LEVEL 1 EA ADDTL HR: Performed by: ORTHOPAEDIC SURGERY

## 2019-10-04 PROCEDURE — 25000128 H RX IP 250 OP 636: Performed by: ORTHOPAEDIC SURGERY

## 2019-10-04 PROCEDURE — 25800030 ZZH RX IP 258 OP 636: Performed by: NURSE ANESTHETIST, CERTIFIED REGISTERED

## 2019-10-04 PROCEDURE — 27210794 ZZH OR GENERAL SUPPLY STERILE: Performed by: ORTHOPAEDIC SURGERY

## 2019-10-04 PROCEDURE — 25000125 ZZHC RX 250: Performed by: NURSE ANESTHETIST, CERTIFIED REGISTERED

## 2019-10-04 PROCEDURE — 88304 TISSUE EXAM BY PATHOLOGIST: CPT | Performed by: ORTHOPAEDIC SURGERY

## 2019-10-04 PROCEDURE — C1713 ANCHOR/SCREW BN/BN,TIS/BN: HCPCS | Performed by: ORTHOPAEDIC SURGERY

## 2019-10-04 PROCEDURE — 25800030 ZZH RX IP 258 OP 636: Performed by: ANESTHESIOLOGY

## 2019-10-04 PROCEDURE — 12000000 ZZH R&B MED SURG/OB

## 2019-10-04 PROCEDURE — 25000132 ZZH RX MED GY IP 250 OP 250 PS 637: Performed by: PHYSICIAN ASSISTANT

## 2019-10-04 PROCEDURE — C1776 JOINT DEVICE (IMPLANTABLE): HCPCS | Performed by: ORTHOPAEDIC SURGERY

## 2019-10-04 PROCEDURE — 37000008 ZZH ANESTHESIA TECHNICAL FEE, 1ST 30 MIN: Performed by: ORTHOPAEDIC SURGERY

## 2019-10-04 PROCEDURE — 71000012 ZZH RECOVERY PHASE 1 LEVEL 1 FIRST HR: Performed by: ORTHOPAEDIC SURGERY

## 2019-10-04 PROCEDURE — 40000306 ZZH STATISTIC PRE PROC ASSESS II: Performed by: ORTHOPAEDIC SURGERY

## 2019-10-04 PROCEDURE — 99207 ZZC CONSULT E&M CHANGED TO INITIAL LEVEL: CPT | Performed by: INTERNAL MEDICINE

## 2019-10-04 PROCEDURE — 99221 1ST HOSP IP/OBS SF/LOW 40: CPT | Performed by: INTERNAL MEDICINE

## 2019-10-04 PROCEDURE — 85049 AUTOMATED PLATELET COUNT: CPT | Performed by: ORTHOPAEDIC SURGERY

## 2019-10-04 PROCEDURE — 37000009 ZZH ANESTHESIA TECHNICAL FEE, EACH ADDTL 15 MIN: Performed by: ORTHOPAEDIC SURGERY

## 2019-10-04 PROCEDURE — 25000128 H RX IP 250 OP 636: Performed by: NURSE ANESTHETIST, CERTIFIED REGISTERED

## 2019-10-04 PROCEDURE — 97161 PT EVAL LOW COMPLEX 20 MIN: CPT | Mod: GP

## 2019-10-04 PROCEDURE — 82565 ASSAY OF CREATININE: CPT | Performed by: ORTHOPAEDIC SURGERY

## 2019-10-04 PROCEDURE — 25800025 ZZH RX 258: Performed by: ORTHOPAEDIC SURGERY

## 2019-10-04 PROCEDURE — 86900 BLOOD TYPING SEROLOGIC ABO: CPT | Performed by: ORTHOPAEDIC SURGERY

## 2019-10-04 PROCEDURE — 97140 MANUAL THERAPY 1/> REGIONS: CPT | Mod: GP

## 2019-10-04 PROCEDURE — 36000065 ZZH SURGERY LEVEL 4 W FLUORO 1ST 30 MIN: Performed by: ORTHOPAEDIC SURGERY

## 2019-10-04 PROCEDURE — 36000063 ZZH SURGERY LEVEL 4 EA 15 ADDTL MIN: Performed by: ORTHOPAEDIC SURGERY

## 2019-10-04 PROCEDURE — 40000277 XR SURGERY CARM FLUORO LESS THAN 5 MIN W STILLS: Mod: TC

## 2019-10-04 PROCEDURE — 86901 BLOOD TYPING SEROLOGIC RH(D): CPT | Performed by: ORTHOPAEDIC SURGERY

## 2019-10-04 PROCEDURE — 88304 TISSUE EXAM BY PATHOLOGIST: CPT | Mod: 26 | Performed by: ORTHOPAEDIC SURGERY

## 2019-10-04 PROCEDURE — 40000985 XR HIP PORT LT 1 VW: Mod: TC

## 2019-10-04 PROCEDURE — 36415 COLL VENOUS BLD VENIPUNCTURE: CPT | Performed by: ORTHOPAEDIC SURGERY

## 2019-10-04 PROCEDURE — 25000128 H RX IP 250 OP 636: Performed by: PHYSICIAN ASSISTANT

## 2019-10-04 PROCEDURE — 40000986 XR PELVIS AND HIP PORTABLE LEFT 1 VIEW

## 2019-10-04 PROCEDURE — 86850 RBC ANTIBODY SCREEN: CPT | Performed by: ORTHOPAEDIC SURGERY

## 2019-10-04 DEVICE — IMPLANTABLE DEVICE: Type: IMPLANTABLE DEVICE | Site: HIP | Status: FUNCTIONAL

## 2019-10-04 DEVICE — IMP SCR ZIM 6.5X20MM ACET CUP SELF TAP 00-6250-065-20: Type: IMPLANTABLE DEVICE | Site: HIP | Status: FUNCTIONAL

## 2019-10-04 RX ORDER — CELECOXIB 200 MG/1
400 CAPSULE ORAL ONCE
Status: COMPLETED | OUTPATIENT
Start: 2019-10-04 | End: 2019-10-04

## 2019-10-04 RX ORDER — FENTANYL CITRATE 50 UG/ML
25-50 INJECTION, SOLUTION INTRAMUSCULAR; INTRAVENOUS
Status: DISCONTINUED | OUTPATIENT
Start: 2019-10-04 | End: 2019-10-04 | Stop reason: HOSPADM

## 2019-10-04 RX ORDER — ZOLPIDEM TARTRATE 5 MG/1
5 TABLET ORAL
Status: DISCONTINUED | OUTPATIENT
Start: 2019-10-05 | End: 2019-10-07 | Stop reason: HOSPADM

## 2019-10-04 RX ORDER — ONDANSETRON 2 MG/ML
4 INJECTION INTRAMUSCULAR; INTRAVENOUS EVERY 30 MIN PRN
Status: DISCONTINUED | OUTPATIENT
Start: 2019-10-04 | End: 2019-10-04 | Stop reason: HOSPADM

## 2019-10-04 RX ORDER — MULTIVITAMIN,THERAPEUTIC
1 TABLET ORAL DAILY
Status: DISCONTINUED | OUTPATIENT
Start: 2019-10-04 | End: 2019-10-07 | Stop reason: HOSPADM

## 2019-10-04 RX ORDER — HALOPERIDOL 5 MG/ML
1 INJECTION INTRAMUSCULAR ONCE
Status: COMPLETED | OUTPATIENT
Start: 2019-10-04 | End: 2019-10-04

## 2019-10-04 RX ORDER — LIDOCAINE 40 MG/G
CREAM TOPICAL
Status: DISCONTINUED | OUTPATIENT
Start: 2019-10-04 | End: 2019-10-07 | Stop reason: HOSPADM

## 2019-10-04 RX ORDER — EZETIMIBE 10 MG/1
10 TABLET ORAL DAILY
Status: DISCONTINUED | OUTPATIENT
Start: 2019-10-04 | End: 2019-10-07 | Stop reason: HOSPADM

## 2019-10-04 RX ORDER — SODIUM CHLORIDE, SODIUM LACTATE, POTASSIUM CHLORIDE, CALCIUM CHLORIDE 600; 310; 30; 20 MG/100ML; MG/100ML; MG/100ML; MG/100ML
INJECTION, SOLUTION INTRAVENOUS CONTINUOUS
Status: DISCONTINUED | OUTPATIENT
Start: 2019-10-04 | End: 2019-10-04 | Stop reason: HOSPADM

## 2019-10-04 RX ORDER — CEFAZOLIN SODIUM 1 G/3ML
1 INJECTION, POWDER, FOR SOLUTION INTRAMUSCULAR; INTRAVENOUS SEE ADMIN INSTRUCTIONS
Status: DISCONTINUED | OUTPATIENT
Start: 2019-10-04 | End: 2019-10-06

## 2019-10-04 RX ORDER — AZELASTINE 1 MG/ML
1 SPRAY, METERED NASAL DAILY PRN
Status: DISCONTINUED | OUTPATIENT
Start: 2019-10-04 | End: 2019-10-07 | Stop reason: HOSPADM

## 2019-10-04 RX ORDER — CLOPIDOGREL BISULFATE 75 MG/1
75 TABLET ORAL DAILY
Status: DISCONTINUED | OUTPATIENT
Start: 2019-10-04 | End: 2019-10-07 | Stop reason: HOSPADM

## 2019-10-04 RX ORDER — HYDROXYZINE HYDROCHLORIDE 25 MG/1
25 TABLET, FILM COATED ORAL 3 TIMES DAILY PRN
Status: DISCONTINUED | OUTPATIENT
Start: 2019-10-04 | End: 2019-10-07 | Stop reason: HOSPADM

## 2019-10-04 RX ORDER — CEFAZOLIN SODIUM 2 G/100ML
2 INJECTION, SOLUTION INTRAVENOUS
Status: COMPLETED | OUTPATIENT
Start: 2019-10-04 | End: 2019-10-04

## 2019-10-04 RX ORDER — NALOXONE HYDROCHLORIDE 0.4 MG/ML
.1-.4 INJECTION, SOLUTION INTRAMUSCULAR; INTRAVENOUS; SUBCUTANEOUS
Status: DISCONTINUED | OUTPATIENT
Start: 2019-10-04 | End: 2019-10-07 | Stop reason: HOSPADM

## 2019-10-04 RX ORDER — GLYCOPYRROLATE 0.2 MG/ML
INJECTION, SOLUTION INTRAMUSCULAR; INTRAVENOUS PRN
Status: DISCONTINUED | OUTPATIENT
Start: 2019-10-04 | End: 2019-10-04

## 2019-10-04 RX ORDER — DEXAMETHASONE SODIUM PHOSPHATE 4 MG/ML
INJECTION, SOLUTION INTRA-ARTICULAR; INTRALESIONAL; INTRAMUSCULAR; INTRAVENOUS; SOFT TISSUE PRN
Status: DISCONTINUED | OUTPATIENT
Start: 2019-10-04 | End: 2019-10-04

## 2019-10-04 RX ORDER — AMOXICILLIN 250 MG
2 CAPSULE ORAL 2 TIMES DAILY
Status: DISCONTINUED | OUTPATIENT
Start: 2019-10-04 | End: 2019-10-07 | Stop reason: HOSPADM

## 2019-10-04 RX ORDER — PROPOFOL 10 MG/ML
INJECTION, EMULSION INTRAVENOUS PRN
Status: DISCONTINUED | OUTPATIENT
Start: 2019-10-04 | End: 2019-10-04

## 2019-10-04 RX ORDER — CEFAZOLIN SODIUM 1 G/50ML
1 INJECTION, SOLUTION INTRAVENOUS EVERY 8 HOURS
Status: COMPLETED | OUTPATIENT
Start: 2019-10-04 | End: 2019-10-05

## 2019-10-04 RX ORDER — NALOXONE HYDROCHLORIDE 0.4 MG/ML
.1-.4 INJECTION, SOLUTION INTRAMUSCULAR; INTRAVENOUS; SUBCUTANEOUS
Status: DISCONTINUED | OUTPATIENT
Start: 2019-10-04 | End: 2019-10-04

## 2019-10-04 RX ORDER — KETAMINE HYDROCHLORIDE 10 MG/ML
INJECTION INTRAMUSCULAR; INTRAVENOUS PRN
Status: DISCONTINUED | OUTPATIENT
Start: 2019-10-04 | End: 2019-10-04

## 2019-10-04 RX ORDER — AMOXICILLIN 250 MG
1 CAPSULE ORAL 2 TIMES DAILY
Status: DISCONTINUED | OUTPATIENT
Start: 2019-10-04 | End: 2019-10-07 | Stop reason: HOSPADM

## 2019-10-04 RX ORDER — ONDANSETRON 2 MG/ML
4 INJECTION INTRAMUSCULAR; INTRAVENOUS EVERY 6 HOURS PRN
Status: DISCONTINUED | OUTPATIENT
Start: 2019-10-04 | End: 2019-10-07 | Stop reason: HOSPADM

## 2019-10-04 RX ORDER — ONDANSETRON 4 MG/1
4 TABLET, ORALLY DISINTEGRATING ORAL EVERY 6 HOURS PRN
Status: DISCONTINUED | OUTPATIENT
Start: 2019-10-04 | End: 2019-10-07 | Stop reason: HOSPADM

## 2019-10-04 RX ORDER — ROSUVASTATIN CALCIUM 20 MG/1
40 TABLET, COATED ORAL EVERY EVENING
Status: DISCONTINUED | OUTPATIENT
Start: 2019-10-04 | End: 2019-10-07 | Stop reason: HOSPADM

## 2019-10-04 RX ORDER — LEVOTHYROXINE SODIUM 25 UG/1
25 TABLET ORAL DAILY
Status: DISCONTINUED | OUTPATIENT
Start: 2019-10-04 | End: 2019-10-07 | Stop reason: HOSPADM

## 2019-10-04 RX ORDER — LABETALOL 20 MG/4 ML (5 MG/ML) INTRAVENOUS SYRINGE
10
Status: DISCONTINUED | OUTPATIENT
Start: 2019-10-04 | End: 2019-10-04 | Stop reason: HOSPADM

## 2019-10-04 RX ORDER — SODIUM CHLORIDE 9 MG/ML
INJECTION, SOLUTION INTRAVENOUS CONTINUOUS
Status: DISCONTINUED | OUTPATIENT
Start: 2019-10-04 | End: 2019-10-06

## 2019-10-04 RX ORDER — HYDRALAZINE HYDROCHLORIDE 20 MG/ML
2.5-5 INJECTION INTRAMUSCULAR; INTRAVENOUS EVERY 10 MIN PRN
Status: DISCONTINUED | OUTPATIENT
Start: 2019-10-04 | End: 2019-10-04 | Stop reason: HOSPADM

## 2019-10-04 RX ORDER — OXYCODONE HYDROCHLORIDE 5 MG/1
5-10 TABLET ORAL EVERY 4 HOURS PRN
Status: DISCONTINUED | OUTPATIENT
Start: 2019-10-04 | End: 2019-10-07 | Stop reason: HOSPADM

## 2019-10-04 RX ORDER — HYDROMORPHONE HYDROCHLORIDE 1 MG/ML
.3-.5 INJECTION, SOLUTION INTRAMUSCULAR; INTRAVENOUS; SUBCUTANEOUS
Status: DISCONTINUED | OUTPATIENT
Start: 2019-10-04 | End: 2019-10-07 | Stop reason: HOSPADM

## 2019-10-04 RX ORDER — PANTOPRAZOLE SODIUM 40 MG/1
40 TABLET, DELAYED RELEASE ORAL ONCE
Status: COMPLETED | OUTPATIENT
Start: 2019-10-04 | End: 2019-10-04

## 2019-10-04 RX ORDER — LIDOCAINE HYDROCHLORIDE 10 MG/ML
INJECTION, SOLUTION INFILTRATION; PERINEURAL PRN
Status: DISCONTINUED | OUTPATIENT
Start: 2019-10-04 | End: 2019-10-04

## 2019-10-04 RX ORDER — NEOSTIGMINE METHYLSULFATE 1 MG/ML
VIAL (ML) INJECTION PRN
Status: DISCONTINUED | OUTPATIENT
Start: 2019-10-04 | End: 2019-10-04

## 2019-10-04 RX ORDER — PROPOFOL 10 MG/ML
INJECTION, EMULSION INTRAVENOUS CONTINUOUS PRN
Status: DISCONTINUED | OUTPATIENT
Start: 2019-10-04 | End: 2019-10-04

## 2019-10-04 RX ORDER — HYDROMORPHONE HYDROCHLORIDE 1 MG/ML
.3-.5 INJECTION, SOLUTION INTRAMUSCULAR; INTRAVENOUS; SUBCUTANEOUS EVERY 5 MIN PRN
Status: DISCONTINUED | OUTPATIENT
Start: 2019-10-04 | End: 2019-10-04 | Stop reason: HOSPADM

## 2019-10-04 RX ORDER — ONDANSETRON 4 MG/1
4 TABLET, ORALLY DISINTEGRATING ORAL EVERY 30 MIN PRN
Status: DISCONTINUED | OUTPATIENT
Start: 2019-10-04 | End: 2019-10-04 | Stop reason: HOSPADM

## 2019-10-04 RX ORDER — DIMENHYDRINATE 50 MG/ML
25 INJECTION, SOLUTION INTRAMUSCULAR; INTRAVENOUS
Status: DISCONTINUED | OUTPATIENT
Start: 2019-10-04 | End: 2019-10-04 | Stop reason: HOSPADM

## 2019-10-04 RX ORDER — ONDANSETRON 2 MG/ML
INJECTION INTRAMUSCULAR; INTRAVENOUS PRN
Status: DISCONTINUED | OUTPATIENT
Start: 2019-10-04 | End: 2019-10-04

## 2019-10-04 RX ORDER — SODIUM CHLORIDE, SODIUM LACTATE, POTASSIUM CHLORIDE, CALCIUM CHLORIDE 600; 310; 30; 20 MG/100ML; MG/100ML; MG/100ML; MG/100ML
INJECTION, SOLUTION INTRAVENOUS CONTINUOUS
Status: DISCONTINUED | OUTPATIENT
Start: 2019-10-04 | End: 2019-10-05

## 2019-10-04 RX ORDER — EPHEDRINE SULFATE 50 MG/ML
INJECTION, SOLUTION INTRAMUSCULAR; INTRAVENOUS; SUBCUTANEOUS PRN
Status: DISCONTINUED | OUTPATIENT
Start: 2019-10-04 | End: 2019-10-04

## 2019-10-04 RX ORDER — ACETAMINOPHEN 325 MG/1
975 TABLET ORAL EVERY 8 HOURS
Status: DISPENSED | OUTPATIENT
Start: 2019-10-04 | End: 2019-10-07

## 2019-10-04 RX ORDER — CELECOXIB 100 MG/1
100 CAPSULE ORAL 2 TIMES DAILY
Status: COMPLETED | OUTPATIENT
Start: 2019-10-04 | End: 2019-10-06

## 2019-10-04 RX ORDER — OXYBUTYNIN CHLORIDE 5 MG/1
5 TABLET, EXTENDED RELEASE ORAL DAILY
Status: DISCONTINUED | OUTPATIENT
Start: 2019-10-04 | End: 2019-10-07 | Stop reason: HOSPADM

## 2019-10-04 RX ORDER — FENTANYL CITRATE 50 UG/ML
INJECTION, SOLUTION INTRAMUSCULAR; INTRAVENOUS PRN
Status: DISCONTINUED | OUTPATIENT
Start: 2019-10-04 | End: 2019-10-04

## 2019-10-04 RX ORDER — ACETAMINOPHEN 325 MG/1
650 TABLET ORAL EVERY 4 HOURS PRN
Status: DISCONTINUED | OUTPATIENT
Start: 2019-10-07 | End: 2019-10-07 | Stop reason: HOSPADM

## 2019-10-04 RX ORDER — GLYCINE 1.5 G/100ML
SOLUTION IRRIGATION PRN
Status: DISCONTINUED | OUTPATIENT
Start: 2019-10-04 | End: 2019-10-04 | Stop reason: HOSPADM

## 2019-10-04 RX ORDER — ONDANSETRON 2 MG/ML
4 INJECTION INTRAMUSCULAR; INTRAVENOUS EVERY 6 HOURS
Status: DISPENSED | OUTPATIENT
Start: 2019-10-04 | End: 2019-10-05

## 2019-10-04 RX ADMIN — SODIUM CHLORIDE: 9 INJECTION, SOLUTION INTRAVENOUS at 18:14

## 2019-10-04 RX ADMIN — CEFAZOLIN 1 G: 1 INJECTION, POWDER, FOR SOLUTION INTRAMUSCULAR; INTRAVENOUS at 09:58

## 2019-10-04 RX ADMIN — FENTANYL CITRATE 50 MCG: 50 INJECTION INTRAMUSCULAR; INTRAVENOUS at 12:25

## 2019-10-04 RX ADMIN — ACETAMINOPHEN 975 MG: 325 TABLET, FILM COATED ORAL at 20:41

## 2019-10-04 RX ADMIN — ONDANSETRON HYDROCHLORIDE 4 MG: 2 INJECTION, SOLUTION INTRAMUSCULAR; INTRAVENOUS at 20:42

## 2019-10-04 RX ADMIN — HYDROMORPHONE HYDROCHLORIDE 0.5 MG: 1 INJECTION, SOLUTION INTRAMUSCULAR; INTRAVENOUS; SUBCUTANEOUS at 08:34

## 2019-10-04 RX ADMIN — ROSUVASTATIN CALCIUM 40 MG: 20 TABLET, FILM COATED ORAL at 20:41

## 2019-10-04 RX ADMIN — HYDROMORPHONE HYDROCHLORIDE 0.5 MG: 1 INJECTION, SOLUTION INTRAMUSCULAR; INTRAVENOUS; SUBCUTANEOUS at 13:03

## 2019-10-04 RX ADMIN — FENTANYL CITRATE 50 MCG: 50 INJECTION INTRAMUSCULAR; INTRAVENOUS at 12:35

## 2019-10-04 RX ADMIN — EZETIMIBE 10 MG: 10 TABLET ORAL at 18:12

## 2019-10-04 RX ADMIN — Medication 5 MG: at 10:18

## 2019-10-04 RX ADMIN — DEXMEDETOMIDINE HYDROCHLORIDE 0.4 MCG/KG/HR: 100 INJECTION, SOLUTION INTRAVENOUS at 08:02

## 2019-10-04 RX ADMIN — FENTANYL CITRATE 100 MCG: 50 INJECTION, SOLUTION INTRAMUSCULAR; INTRAVENOUS at 07:54

## 2019-10-04 RX ADMIN — ROCURONIUM BROMIDE 35 MG: 10 INJECTION INTRAVENOUS at 07:54

## 2019-10-04 RX ADMIN — GLYCOPYRROLATE 0.4 MG: 0.2 INJECTION, SOLUTION INTRAMUSCULAR; INTRAVENOUS at 11:36

## 2019-10-04 RX ADMIN — ROCURONIUM BROMIDE 10 MG: 10 INJECTION INTRAVENOUS at 09:23

## 2019-10-04 RX ADMIN — CLOPIDOGREL BISULFATE 75 MG: 75 TABLET, FILM COATED ORAL at 18:09

## 2019-10-04 RX ADMIN — PANTOPRAZOLE SODIUM 40 MG: 40 TABLET, DELAYED RELEASE ORAL at 06:40

## 2019-10-04 RX ADMIN — CELECOXIB 100 MG: 100 CAPSULE ORAL at 20:41

## 2019-10-04 RX ADMIN — SODIUM CHLORIDE, POTASSIUM CHLORIDE, SODIUM LACTATE AND CALCIUM CHLORIDE: 600; 310; 30; 20 INJECTION, SOLUTION INTRAVENOUS at 09:15

## 2019-10-04 RX ADMIN — RANITIDINE 150 MG: 150 TABLET ORAL at 20:41

## 2019-10-04 RX ADMIN — HYDROMORPHONE HYDROCHLORIDE 0.5 MG: 1 INJECTION, SOLUTION INTRAMUSCULAR; INTRAVENOUS; SUBCUTANEOUS at 08:15

## 2019-10-04 RX ADMIN — GLYCOPYRROLATE 0.2 MG: 0.2 INJECTION, SOLUTION INTRAMUSCULAR; INTRAVENOUS at 07:54

## 2019-10-04 RX ADMIN — OXYBUTYNIN CHLORIDE 5 MG: 5 TABLET, EXTENDED RELEASE ORAL at 18:13

## 2019-10-04 RX ADMIN — CEFAZOLIN SODIUM 2 G: 2 INJECTION, SOLUTION INTRAVENOUS at 07:58

## 2019-10-04 RX ADMIN — OXYCODONE HYDROCHLORIDE 10 MG: 5 TABLET ORAL at 23:52

## 2019-10-04 RX ADMIN — SODIUM CHLORIDE, POTASSIUM CHLORIDE, SODIUM LACTATE AND CALCIUM CHLORIDE: 600; 310; 30; 20 INJECTION, SOLUTION INTRAVENOUS at 11:31

## 2019-10-04 RX ADMIN — FENTANYL CITRATE 50 MCG: 50 INJECTION INTRAMUSCULAR; INTRAVENOUS at 13:44

## 2019-10-04 RX ADMIN — PHENYLEPHRINE HYDROCHLORIDE 50 MCG: 10 INJECTION INTRAVENOUS at 09:37

## 2019-10-04 RX ADMIN — CEFAZOLIN SODIUM 1 G: 1 INJECTION, SOLUTION INTRAVENOUS at 17:34

## 2019-10-04 RX ADMIN — PHENYLEPHRINE HYDROCHLORIDE 50 MCG: 10 INJECTION INTRAVENOUS at 10:33

## 2019-10-04 RX ADMIN — ROCURONIUM BROMIDE 5 MG: 10 INJECTION INTRAVENOUS at 09:30

## 2019-10-04 RX ADMIN — SODIUM CHLORIDE, POTASSIUM CHLORIDE, SODIUM LACTATE AND CALCIUM CHLORIDE: 600; 310; 30; 20 INJECTION, SOLUTION INTRAVENOUS at 07:00

## 2019-10-04 RX ADMIN — ONDANSETRON HYDROCHLORIDE 4 MG: 2 INJECTION, SOLUTION INTRAVENOUS at 07:54

## 2019-10-04 RX ADMIN — ONDANSETRON HYDROCHLORIDE 4 MG: 2 INJECTION, SOLUTION INTRAMUSCULAR; INTRAVENOUS at 13:31

## 2019-10-04 RX ADMIN — Medication 2 MG: at 11:36

## 2019-10-04 RX ADMIN — PHENYLEPHRINE HYDROCHLORIDE 50 MCG: 10 INJECTION INTRAVENOUS at 10:42

## 2019-10-04 RX ADMIN — Medication 10 MG: at 08:05

## 2019-10-04 RX ADMIN — LIDOCAINE HYDROCHLORIDE 50 MG: 10 INJECTION, SOLUTION INFILTRATION; PERINEURAL at 07:54

## 2019-10-04 RX ADMIN — MIDAZOLAM 1 MG: 1 INJECTION INTRAMUSCULAR; INTRAVENOUS at 07:47

## 2019-10-04 RX ADMIN — PROPOFOL 50 MCG/KG/MIN: 10 INJECTION, EMULSION INTRAVENOUS at 08:02

## 2019-10-04 RX ADMIN — CELECOXIB 400 MG: 200 CAPSULE ORAL at 06:40

## 2019-10-04 RX ADMIN — PHENYLEPHRINE HYDROCHLORIDE 50 MCG: 10 INJECTION INTRAVENOUS at 10:12

## 2019-10-04 RX ADMIN — ROCURONIUM BROMIDE 5 MG: 10 INJECTION INTRAVENOUS at 10:47

## 2019-10-04 RX ADMIN — CEFAZOLIN SODIUM 1 G: 1 INJECTION, SOLUTION INTRAVENOUS at 23:48

## 2019-10-04 RX ADMIN — ROCURONIUM BROMIDE 10 MG: 10 INJECTION INTRAVENOUS at 11:00

## 2019-10-04 RX ADMIN — ROCURONIUM BROMIDE 20 MG: 10 INJECTION INTRAVENOUS at 09:53

## 2019-10-04 RX ADMIN — ROCURONIUM BROMIDE 5 MG: 10 INJECTION INTRAVENOUS at 10:27

## 2019-10-04 RX ADMIN — OXYCODONE HYDROCHLORIDE 5 MG: 5 TABLET ORAL at 17:35

## 2019-10-04 RX ADMIN — DEXAMETHASONE SODIUM PHOSPHATE 4 MG: 4 INJECTION, SOLUTION INTRA-ARTICULAR; INTRALESIONAL; INTRAMUSCULAR; INTRAVENOUS; SOFT TISSUE at 07:54

## 2019-10-04 RX ADMIN — Medication 5 MG: at 10:51

## 2019-10-04 RX ADMIN — PROPOFOL 200 MG: 10 INJECTION, EMULSION INTRAVENOUS at 07:54

## 2019-10-04 RX ADMIN — Medication 20 MG: at 08:08

## 2019-10-04 RX ADMIN — Medication 20 MG: at 08:03

## 2019-10-04 RX ADMIN — HALOPERIDOL LACTATE 1 MG: 5 INJECTION INTRAMUSCULAR at 14:03

## 2019-10-04 RX ADMIN — SENNOSIDES AND DOCUSATE SODIUM 1 TABLET: 8.6; 5 TABLET ORAL at 20:41

## 2019-10-04 RX ADMIN — PHENYLEPHRINE HYDROCHLORIDE 50 MCG: 10 INJECTION INTRAVENOUS at 10:25

## 2019-10-04 RX ADMIN — SODIUM CHLORIDE 1000 ML: 9 INJECTION, SOLUTION INTRAVENOUS at 15:26

## 2019-10-04 RX ADMIN — SODIUM CHLORIDE, POTASSIUM CHLORIDE, SODIUM LACTATE AND CALCIUM CHLORIDE: 600; 310; 30; 20 INJECTION, SOLUTION INTRAVENOUS at 10:37

## 2019-10-04 RX ADMIN — PHENYLEPHRINE HYDROCHLORIDE 50 MCG: 10 INJECTION INTRAVENOUS at 08:15

## 2019-10-04 ASSESSMENT — MIFFLIN-ST. JEOR: SCORE: 1224.42

## 2019-10-04 ASSESSMENT — ENCOUNTER SYMPTOMS
SEIZURES: 0
STRIDOR: 0
DYSRHYTHMIAS: 0

## 2019-10-04 ASSESSMENT — ACTIVITIES OF DAILY LIVING (ADL)
SWALLOWING: 0-->SWALLOWS FOODS/LIQUIDS WITHOUT DIFFICULTY
ADLS_ACUITY_SCORE: 14
ADLS_ACUITY_SCORE: 18
DRESS: 0-->INDEPENDENT
RETIRED_COMMUNICATION: 0-->UNDERSTANDS/COMMUNICATES WITHOUT DIFFICULTY
COGNITION: 0 - NO COGNITION ISSUES REPORTED
RETIRED_EATING: 0-->INDEPENDENT

## 2019-10-04 ASSESSMENT — COPD QUESTIONNAIRES: COPD: 0

## 2019-10-04 ASSESSMENT — LIFESTYLE VARIABLES: TOBACCO_USE: 0

## 2019-10-04 NOTE — ANESTHESIA PREPROCEDURE EVALUATION
Anesthesia Pre-Procedure Evaluation    Patient: Angeli Jacobson   MRN: [id number] : 1950          Preoperative Diagnosis: Post-traumatic osteoarthritis of left hip [M16.52]  Presence of retained hardware [Z96.9]    Procedure(s):  Hardware removal hardware left hip and left total hip arthroplasty    Past Medical History:   Diagnosis Date     * * * SBE PROPHYLAXIS * * *     Amox 500mg, take 4 tabs one hour prior to procedure.Takes this because of lymphedema secondary from leg surgey     Central serous retinopathy     Resolved 2001     CHRONIC NECK PAIN      Depressive disorder      Depressive disorder, not elsewhere classified      History of blood transfusion 2019    during left hip pinning     MIXED HYPERLIPIDEMIA, LDL GOAL <160     LDL goal < 160     Motion sickness      MYXOID LIPOSARCOMA     Left thigh, S/P excision, radiation  at [facility]     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    CHRONIC LEFT THIGH LYMPHEDEMA     Nontoxic multinodular goiter 2005    needs yearly US     Osteoporosis, unspecified      Other lymphedema     left thigh, gets regular PT for this     PAD (peripheral artery disease) (H) 2018     PONV (postoperative nausea and vomiting)      SHINGLES      Sprain of lumbosacral (joint) (ligament)     right     Unspecified hearing loss     chronic tinnitus     Unspecified tinnitus      Past Surgical History:   Procedure Laterality Date     BYPASS GRAFT FEMOROPOPLITEAL Left 2019    Procedure: LEFT FEMORAL TO ABOVE KNEE POPLITEAL  BYPASS WITH POLYTETRAFLUOROETHYLENE GRAFT;  Surgeon: Shade Owens MD;  Location:  OR     C APPENDECTOMY      Appendectomy     C NONSPECIFIC PROCEDURE  2000    Open Biopsy Left Thigh Liposarcoma     COLONOSCOPY N/A 2016    Procedure: COMBINED COLONOSCOPY, SINGLE OR MULTIPLE BIOPSY/POLYPECTOMY BY BIOPSY;  Surgeon: Varun Stanley MD, MD;  Location:  GI     CYSTOSCOPY       EXCISION [facility]  LESION>1.25CM  5/2000    Myxoid Liposarcoma       EXPLORE GROIN Right 5/1/2018    Procedure: EXPLORE GROIN;  EMERGENCY RIGHT FEMORAL EXPLORATION WITH FEMORAL ARTERY REPAIR.    EBL: 50mL;  Surgeon: Shade Owens MD;  Location: [facility] OR     HC COLONOSCOPY THRU STOMA, DIAGNOSTIC  2006    due 2010     HC COLP CERVIX/UPPER VAGINA  07/1997    Negative     HC DILATION/CURETTAGE DIAG/THER NON OB  02/1997    Post menopausal bleeding on HRT, negative     HIP SURGERY Left 04/13/2019     IR ANGIOGRAM THROUGH CATHETER FOLLOW UP  12/20/2018     IR ANGIOGRAM THROUGH CATHETER FOLLOW UP  12/21/2018     IR LOWER EXTREMITY ANGIOGRAM LEFT  12/19/2018     VASCULAR SURGERY  04/30/2018    Left SFA stent in bypass graft     Anesthesia Evaluation     . Pt has had prior anesthetic. Type: General    History of anesthetic complications   - PONV        ROS/MED HX    ENT/Pulmonary:      (-) tobacco use, asthma, COPD, ALEXANDER risk factors and recent URI   Neurologic:  - neg neurologic ROS    (-) seizures and CVA   Cardiovascular:     (+) Dyslipidemia, -Peripheral Vascular Disease (left fem=pop)---. : . . . :. . Previous cardiac testing date:results:date: results:ECG reviewed date:9/19 results:NSR.  IVCD.  Qs in V1-2 date: results:         (-) hypertension, CAD, arrhythmias and valvular problems/murmurs   METS/Exercise Tolerance:     Hematologic: Comments: Lab Test        09/27/19 06/04/19 01/31/19      --          12/19/18      --          05/01/18                       0929          1530          1058           --           1241           --           1700          WBC          6.3          5.9          9.8            < >         --            < >        8.4           HGB          12.8         14.1         12.1           < >         --            < >        9.5*          MCV          92           91           94             < >         --            < >        91            PLT          280          300          471*           < >          --            < >        161           INR          1.01          --           --           --          0.97          --          1.43*          < > = values in this interval not displayed.                  Lab Test        09/27/19 06/04/19 04/30/19                       0929          1530          1243          NA           140          142          140           POTASSIUM    3.9          3.7          4.2           CHLORIDE     106          105          107           CO2          29           28           27            BUN          14           12           18            CR           0.55         0.55         0.64          ANIONGAP     5            9            6             LONG          9.5          9.7          9.3           GLC          91           104*         104*         - neg hematologic  ROS       Musculoskeletal:   (+) arthritis,  -       GI/Hepatic:  - neg GI/hepatic ROS      (-) GERD   Renal/Genitourinary:  - ROS Renal section negative       Endo:     (+) thyroid problem hypothyroidism, .      Psychiatric:     (+) psychiatric history depression      Infectious Disease:  - neg infectious disease ROS       Malignancy:   (+) Malignancy History of Other  Other CA sarcoma status post         Other:    - neg other ROS                      Physical Exam  Normal systems: cardiovascular, pulmonary and dental    Airway   Mallampati: II  TM distance: >3 FB  Neck ROM: full    Dental     Cardiovascular   Rhythm and rate: regular and normal  (-) no friction rub, no systolic click and no murmur    Pulmonary    breath sounds clear to auscultation(-) no rhonchi, no decreased breath sounds, no wheezes, no rales and no stridor            Lab Results   Component Value Date    WBC 6.3 09/27/2019    HGB 12.8 09/27/2019    HCT 39.4 09/27/2019     09/27/2019    CRP 3.0 06/06/2006    SED 62 (H) 05/16/2006     09/27/2019    POTASSIUM 3.9 09/27/2019    CHLORIDE 106 09/27/2019    CO2 29 09/27/2019    BUN 14  "09/27/2019    CR 0.55 09/27/2019    GLC 91 09/27/2019    LONG 9.5 09/27/2019    MAG 1.8 05/01/2018    ALBUMIN 3.9 09/27/2019    PROTTOTAL 7.6 09/27/2019    ALT 29 09/27/2019    AST 19 09/27/2019    ALKPHOS 81 09/27/2019    BILITOTAL 0.3 09/27/2019    PTT 27 12/19/2018    INR 1.01 09/27/2019    TSH 10.78 (H) 09/17/2019    T4 0.72 (L) 09/17/2019       Preop Vitals  BP Readings from Last 3 Encounters:   10/04/19 114/78   09/27/19 128/72   09/18/19 (!) 142/84    Pulse Readings from Last 3 Encounters:   09/27/19 71   09/11/19 72   08/13/19 70      Resp Readings from Last 3 Encounters:   10/04/19 16   09/27/19 16   09/17/19 18    SpO2 Readings from Last 3 Encounters:   10/04/19 99%   09/18/19 98%   09/11/19 96%      Temp Readings from Last 1 Encounters:   10/04/19 97.2  F (36.2  C) (Temporal)    Ht Readings from Last 1 Encounters:   10/04/19 1.651 m (5' 5\")      Wt Readings from Last 1 Encounters:   10/04/19 69.9 kg (154 lb)    Estimated body mass index is 25.63 kg/m  as calculated from the following:    Height as of this encounter: 1.651 m (5' 5\").    Weight as of this encounter: 69.9 kg (154 lb).       Anesthesia Plan      History & Physical Review  History and physical reviewed and following examination; no interval change.    ASA Status:  2 .    NPO Status:  > 8 hours    Plan for General and ETT with Intravenous induction. Maintenance will be Balanced.    PONV prophylaxis:  Ondansetron (or other 5HT-3) and Dexamethasone or Solumedrol       Postoperative Care  Postoperative pain management:  IV analgesics.      Consents  Anesthetic plan, risks, benefits and alternatives discussed with:  Patient or representative, Patient and Spouse..                 Didier Triana MD                    .  "

## 2019-10-04 NOTE — PLAN OF CARE
Patient plan: Home Monday per spouse  Current status: Unable to fully arouse pt. Spouse present, reports pt had pain/nausea in recovery, then was given something and unable to stay awake since. Lymph orders also received. Pt has had LLE edema over 20 years, performs her own wrapping at home. 1-2+ edema LLE up thigh. Gradient compression applied up to mid-thigh, left below incision. Spouse instructed on signs of intolerance, instructed to have nursing remove in high pain, especially throbbing arrives at hip. Nursing also informed. Instructed spouse on keeping HOB lower to prevent blocked lymphatic system at hip crease.  Anticipated status at discharge: Unable to accurately assess given current pt presentation with profound lethargy. Will update as appropriate.

## 2019-10-04 NOTE — BRIEF OP NOTE
[facility]    Brief Operative Note    Pre-operative diagnosis: Left intertrochanter femur fracture non-union  Post-operative diagnosis same  Procedure: Procedure(s):  Hardware removal hardware left hip and left total hip arthroplasty  Surgeon: Surgeon(s) and Role:     * Spencer Celeste MD - Primary     * Christine Alex PA-C - Assisting  Anesthesia: Choice   Estimated blood loss: 500 ml  Drains: None  Specimens:   ID Type Source Tests Collected by Time Destination   A : Left femoral head Bone Bone SURGICAL PATHOLOGY EXAM Spencer Celeste MD 10/4/2019  9:00 AM      Findings:   None.  Complications: None.  Implants:    Implant Name Type Inv. Item Serial No.  Lot No. LRB No. Used   G7 [place] Acetabular Shell, Multi Hole, 54mm, F Total Joint Component/Insert   BIOMET [id number] Left 1   STS distal stem with locking screw interlok/uncemented 16mm, 150mm    BIOMET [id number] Left 1   G7 Acetabular system, dual mobility acetabular liner neutral 44mm, F liner size    BIOMET [id number] Left 1   IMP SCR ZIM 6.5X20MM ACET CUP SELF TAP -014-20 Metallic Hardware/[place] IMP SCR ZIM 6.5X20MM ACET CUP SELF TAP -699-20  [employer] [id number] Left 1   IMP SCR ZIM 6.5X20MM ACET CUP SELF TAP -153-20 Metallic Hardware/[place] IMP SCR ZIM 6.5X20MM ACET CUP SELF TAP -525-20  [employer] [id number] Left 1   IMP SCR ZIM 6.5X20MM ACET CUP SELF TAP -959-20 Metallic Hardware/[place] IMP SCR ZIM 6.5X20MM ACET CUP SELF TAP -323-20  [employer] [id number] Left 1   IMP SCR ZIM 6.5X20MM ACET CUP SELF TAP -860-20 Metallic Hardware/[place] IMP SCR ZIM 6.5X20MM ACET CUP SELF TAP -538-20  [employer] [id number] Left 1   Annalisa Modular Revision Hip System, Standard [place] Prox Body, Size A, 50mm, Taper I Total Joint Component/Insert   BIOMET [id number] Left 1   Annalisa Modular Hip Revision Syste, Trochanteric Claw, Size Large, 100mm Total Joint Component/Insert    BIOMET [id number] Left 1   Active Articulation Hip System Dual Mobility Bearing, 28mm head size, 44mm bearing size, F Total Joint Component/Insert   BIOMET [id number] Left 1   Biolox Delta Hip System, Modular Ceramic Head, Type I Taper, 28mm head, -3mm neck Total Joint Component/Insert   BIOMET [id number] Left 1   Annalisa Modular Revision Hip System, Trochanteric [place], 36mm Total Joint Component/Insert   BIOMET [id number] Left 1   125mm 5 cable Accord standard Trochanteric      [employer] [id number] Left 1   Accord 2.0mm cable for /plates    [employer] [id number] Left 3

## 2019-10-04 NOTE — ANESTHESIA POSTPROCEDURE EVALUATION
Patient: Angeli Jacobson    Procedure(s):  Hardware removal hardware left hip and left total hip arthroplasty    Diagnosis:Post-traumatic osteoarthritis of left hip [M16.52]  Presence of retained hardware [Z96.9]  Diagnosis Additional Information: No value filed.    Anesthesia Type:  General, ETT    Note:  Anesthesia Post Evaluation    Patient location during evaluation: PACU  Patient participation: Able to fully participate in evaluation  Level of consciousness: awake and sleepy but conscious  Pain management: adequate  multimodal analgesia used between 6 hours prior to anesthesia start to PACU dischargeAirway patency: patent  Cardiovascular status: acceptable  Respiratory status: acceptable  Hydration status: acceptable  PONV: none             Last vitals:  Vitals:    10/04/19 1252 10/04/19 1300 10/04/19 1315   BP:  102/64    Pulse:      Resp: 13 12    Temp:   96.8  F (36  C)   SpO2: 100% 100%          Electronically Signed By: Uvaldo Beltran MD  October 4, 2019  1:15 PM

## 2019-10-04 NOTE — OP NOTE
Procedure Date: 10/04/2019      PREOPERATIVE DIAGNOSIS:  Failed internal fixation of left intertrochanteric and subtrochanteric femur fracture.      POSTOPERATIVE DIAGNOSIS:  Failed internal fixation of left intertrochanteric and subtrochanteric femur fracture.      PROCEDURE:  Removal of left femoral hardware with a conversion to left total hip arthroplasty using a Biomet Annalisa femoral stem with an OsseoTi acetabular shell and a dual mobility bearing surface.      SURGEON:  Zachary Celeste MD      FIRST ASSISTANT:  Christine Alex PA-C      INDICATIONS FOR SURGERY:  Ms. Jacobson is a very pleasant 69-year-old female who had a left intertrochanteric subtrochanteric femur fracture fixed in [place] about 6 months ago after a fall.  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  We had a long discussion of treatment options.  She had increasing pain and clearly the hardware is failing and elected to proceed with total hip arthroplasty.  We discussed removing the femoral hardware; the distal interlocking screw which was broken.  We discussed options for leaving it or removing it piecemeal as necessary.  We removed the femoral stem, as well as the interfragmentary lag screw and then for the implant, we  would use a long femoral stem.  On the socket side, we would use an ingrowth porous metal implant and then using a dual mobility bearing surface for stability and fixing the trochanteric fragment with some sort of claw plate.  The patient and her  understand this and are happy with this plan of care and wished to proceed with surgery.      NARRATIVE EVENTS:  After thorough evaluation and proper identification of the patient to be operated on, Ms. Jacobson was taken to the operating room where she underwent  general anesthetic.  She was placed in the right lateral decubitus position with the left hip up and left hip was prepped and draped in the usual sterile manner.  After appropriate surgical pause to confirm the patient's extremity to be operated on, the patient received 2 grams of Ancef.  Her left hip was approached through a lateral incision centered over the greater trochanter connecting the 2 proximal incisions from her initial internal fixation.  Skin and soft tissue were sharply dissected down to the tensor fascia.  Fascia was split in line with the fibers in line with the femur, taking it down to the trochanteric bursa and the anterior one-third gluteus medius was removed off the greater trochanter in modified Hardinge fashion.  This took us down to the joint capsule, which was T'd and the femoral head and neck were surgically dislocated from the acetabulum.  At this point, we then exposed the proximal femur.  We were able to expose the interfragmentary lag screw.  It was evident that the subtrochanteric fracture had not healed and was mobile.  Once we removed the interfragmentary lag screw which we did without much difficulty, it was evident that the intertrochanteric fragment part of the fracture  also had a nonunion.  We then made our femoral neck osteotomy and removed the femoral head from the field and then exposed the acetabulum, removed ligament of teres, transverse acetabular ligament and labrum and then sequentially reamed the acetabulum up to fit a size 54 Biomet OsseoTi multihole revision acetabular component which was impacted into position.  We had good primary stability in 20 degrees of anteversion and 40 degrees of abduction, but we augmented this with 4 screws, 1 in the inner table of the pelvis, 2 in the posterior column and 1 in the ischium.  At this point, we then placed the liner for a 44 mm dual mobility component and paid our attention to the femur.  Here,  we removed what was left of the  bone from the piriformis fossa, mainly what was left of the femoral head fragment and then reamed this using the conical Annalisa reamers up to fit a size 16 x 150 stem.  We then impacted this into position.  Once we had this solidly into position, we then overreamed this with the proximal body reamers up to size A x 50 proximal body fragment with a -3 neck length on a 28 mm femoral head with our 44 dual mobility trial component.  We reduced the hip.  It reduced nicely and was slightly stiff, so the decision was made at that point to place our real implants into position, a size 50 body segment, which we impacted nicely into position with a standard neck offset.  Once this was done, we retrialed our femoral heads and found that a -3 neck length on a 28 mm femoral head gave us good stability and full range of motion with the 44 dual mobility component.  This was then impacted on the trunnion and the hip was reduced.  We then reduced our greater trochanteric fragment which was still slightly mobile using a [employer]hew 125 mm trochanteric claw.  We cabled this down with 3 cables, which affixed this nicely into position and held it nicely stable.  At this point, we then thoroughly irrigated the wound.  We closed in layers with absorbable sutures.  We closed the joint capsule with interrupted 0 Vicryl sutures.  We closed the abductors with #5 Ethibond sutures through bone tunnels.  We closed the tensor fascia with interrupted and running 0 Vicryl sutures and closed skin and soft tissue with absorbable sutures and staples in the skin.  Of note, when we did remove the femoral shell, we did not need to remove the distal interlocking screw which had broken.  Fluoroscopic images showed that the shell was easily able to be removed from the femur without opening or removing any of the distal locking screw, which had already broken and given the patient's lymphedema, we felt it was unnecessary to remove the screw and it was left  intact.  The patient tolerated the procedure without difficulty.  She was taken to recovery room in stable condition.         HALLEY KINSEY MD             D: 10/04/2019   T: 10/04/2019   MT: SAMANTHA      Name:     DIANA ELAINE   MRN:      [id number]        Account:        [id number]   :      1950           Procedure Date: 10/04/2019      Document: [id number]

## 2019-10-04 NOTE — PROGRESS NOTES
10/04/19 1600   Quick Adds   Quick Adds Edema Eval   Type of Visit Initial PT Evaluation   Living Environment   Lives With spouse   Living Arrangements house   Transportation Anticipated family or friend will provide   Living Environment Comment 2 KEENAN without rail, full flight with rail to bed/bath   Self-Care   Usual Activity Tolerance good   Equipment Currently Used at Home none  (Has walker and SEC from inital hip fx)   Activity/Exercise/Self-Care Comment IND with ADLs, wraps LLE edema IND   Functional Level Prior   Ambulation 1-->assistive equipment   Transferring 0-->independent   Toileting 0-->independent   Bathing 0-->independent   Fall history within last six months no   Which of the above functional risks had a recent onset or change? ambulation;transferring;bathing;dressing   Prior Functional Level Comment Started to use SEC since hip fx in April. Spouse reports progressed well after intial fx, no issue with stairs   General Information   Onset of Illness/Injury or Date of Surgery - Date 10/04/19   Referring Physician Spencer Celeste MD   Patient/Family Goals Statement Home per spouse   Pertinent History of Current Problem (include personal factors and/or comorbidities that impact the POC) POD0 removal of L hip hardware and converted to L GISSELL   Precautions/Limitations fall precautions   Weight-Bearing Status - LLE weight-bearing as tolerated   General Info Comments Pt unable to stay alert during eval. Spouse reports initial pain issues in PACU and has not been able to stay awake since. Subjective provided by spouse. Spouse okay with applying lymph wraps   Edema General Information   Onset of Edema   (20+ years/ago)   Affected Body Part(s) Left LE   General Comments/Previous Edema Treatment/Edema Equipment Pt wears compressions or wraps LLE daily, does not wear at night, wraps up to thrigh   Edema Examination/Assessment   Skin Condition Pitting   Skin Condition Comments 1-2+ pitting LLE, mostly  "calf into thigh   Skin Integrity intact   Pitting Assessment 1-2+   Cognitive Status Examination   Orientation person   Level of Consciousness lethargic/somnolent   Follows Commands and Answers Questions unable to follow commands   Pain Assessment   Patient Currently in Pain No   Integumentary/Edema   Integumentary/Edema Comments see above   Posture    Posture Comments unable to assess   Range of Motion (ROM)   ROM Comment PROM to LLE WFL   Strength   Strength Comments NT   Bed Mobility   Bed Mobility Comments Would require total-A due to levle of arousal   Transfer Skills   Transfer Comments Unable to assess   Gait   Gait Comments Unable to assess   Balance   Balance Comments Unable to assess   Modality Interventions   Planned Modality Interventions Cryotherapy   General Therapy Interventions   Planned Therapy Interventions bed mobility training;gait training;manual therapy;strengthening;transfer training;risk factor education;home program guidelines;progressive activity/exercise   Clinical Impression   Criteria for Skilled Therapeutic Intervention yes, treatment indicated   PT Diagnosis Impaired functional mobility, edema   Edema: Patient Presentation Stage 2 Lymphedema   Influenced by the following impairments GISSELL, edema   Functional limitations due to impairments Decreased IND with functional mobility   Clinical Presentation Evolving/Changing   Clinical Presentation Rationale Medical status/presentation   Clinical Decision Making (Complexity) Low complexity   Therapy Frequency 2x/day   Predicted Duration of Therapy Intervention (days/wks) 3 days   Anticipated Discharge Disposition Home with Assist   Risk & Benefits of therapy have been explained Yes   Patient, Family & other staff in agreement with plan of care Yes   Clinical Impression Comments Mobility evaluation limited by alertness. Lymphedema only performed   [facility] AM-PAC TM \"6 Clicks\"   2016, Trustees of [facility], under license to " "[employer].  All rights reserved.   6 Clicks Short Forms Basic Mobility Inpatient Short Form   [facility] AM-PAC  \"6 Clicks\" V.2 Basic Mobility Inpatient Short Form   1. Turning from your back to your side while in a flat bed without using bedrails? 3 - A Little   2. Moving from lying on your back to sitting on the side of a flat bed without using bedrails? 3 - A Little   3. Moving to and from a bed to a chair (including a wheelchair)? 3 - A Little   4. Standing up from a chair using your arms (e.g., wheelchair, or bedside chair)? 3 - A Little   5. To walk in hospital room? 3 - A Little   6. Climbing 3-5 steps with a railing? 3 - A Little   Basic Mobility Raw Score (Score out of 24.Lower scores equate to lower levels of function) 18   Total Evaluation Time   Total Evaluation Time (Minutes) 6     "

## 2019-10-04 NOTE — ANESTHESIA CARE TRANSFER NOTE
Patient: Angeli Jacobson    Procedure(s):  Hardware removal hardware left hip and left total hip arthroplasty    Diagnosis: Post-traumatic osteoarthritis of left hip [M16.52]  Presence of retained hardware [Z96.9]  Diagnosis Additional Information: No value filed.    Anesthesia Type:   General, ETT     Note:  Airway :Face Mask  Patient transferred to:PACU  Handoff Report: Identifed the Patient, Identified the Reponsible Provider, Reviewed the pertinent medical history, Discussed the surgical course, Reviewed Intra-OP anesthesia mangement and issues during anesthesia, Set expectations for post-procedure period and Allowed opportunity for questions and acknowledgement of understanding      Vitals: (Last set prior to Anesthesia Care Transfer)    CRNA VITALS  10/4/2019 1131 - 10/4/2019 1207      10/4/2019             Pulse:  74    SpO2:  100 %    Resp Rate (observed):  (!) 5                Electronically Signed By: ELVA Ocasio CRNA  October 4, 2019  12:07 PM

## 2019-10-04 NOTE — OR NURSING
During the surgical procedure Dr. Celeste explanted the following hardware: Synthes nail and Synthes helical blade. Synthes screw x 1 (broken) was left implanted in the patient's femur. The explanted hardware was disposed of per hospital policy.

## 2019-10-04 NOTE — PLAN OF CARE
Pt arrived from pacu in her bed, iv infusing into right arm, capno not in use due to nausea from the cannuat smell, thompson catheter in place and patent. Aquacel dressing on and clean and dry. Left left more swollen than the right since hx of lymphedema. PCDs on and abductor in place. CMS intact to her feet. Very sleepy on arrival but aroused  to voice though [place] and hearing aids not here.  in room with pt. Oriented both to room, equipment, orders and floor routines. Pt scheduled for PT at 1600. Encouraged to do postop exercises with each VS check. Pt does plan to go home with spouse probably on Monday per pt.

## 2019-10-04 NOTE — CONSULTS
Hospitalist Consultation      Angeli Jacobson MRN# [id number]   YOB: 1950 Age: 69 year old   Date of Admission: 10/4/2019     Requesting Physician: Dr Bartolome Celeste  Reason for consult: Medical Management           Assessment and Plan:     S/P Hardware removal hardware left hip and left total hip arthroplasty    69-year-old woman admitted to the hospital for hardware removal and total hip arthroplasty.This is an elective surgery scheduled, she denies any recent medical concern.  Were consulted to see the patient for medical management while she is in the hospital.    1. Left hip arthroplasty postop day 0  2. Femoral-popliteal bypass history  3. History of sarcoma left thigh status post resection  4. Per lipidemia  5. Depression        Continue home medication  Check hemoglobin and basic metabolic panel electrolyte in the morning  DVT prophylaxis and pain management per surgeon  CODE STATUS Full  Thank you for the consultation we will follow up with you         History of Present Illness:   This patient is a 69 year old female who presents for Hardware removal hardware left hip and left total hip arthroplasty.  Patient still very sleepy recovering from anesthesia, denies any complaints, requesting to be able to sleep without being disturbed.  Denies any chest pain any shortness of breath any recent bleeding.  Has history of peripheral vascular disease  Has not taken any of her medication today            Past Medical History:     Past Medical History:   Diagnosis Date     * * * SBE PROPHYLAXIS * * * 1998    Amox 500mg, take 4 tabs one hour prior to procedure.Takes this because of lymphedema secondary from leg surgey     Central serous retinopathy 2001    Resolved 9/2001     CHRONIC NECK PAIN 1995     Depressive disorder      Depressive disorder, not elsewhere classified 2001     History of blood transfusion 04/2019    during left hip pinning     MIXED HYPERLIPIDEMIA, LDL GOAL <160 1998    LDL  goal < 160     Motion sickness      MYXOID LIPOSARCOMA 2000    Left thigh, S/P excision, radiation  at [facility]     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    CHRONIC LEFT THIGH LYMPHEDEMA     Nontoxic multinodular goiter 2005    needs yearly US     Osteoporosis, unspecified 2001     Other lymphedema 2000    left thigh, gets regular PT for this     PAD (peripheral artery disease) (H) 4/20/2018     PONV (postoperative nausea and vomiting)      SHINGLES 2001     Sprain of lumbosacral (joint) (ligament) 1995    right     Unspecified hearing loss 1998    chronic tinnitus     Unspecified tinnitus 1998             Past Surgical History:     Past Surgical History:   Procedure Laterality Date     BYPASS GRAFT FEMOROPOPLITEAL Left 1/21/2019    Procedure: LEFT FEMORAL TO ABOVE KNEE POPLITEAL  BYPASS WITH POLYTETRAFLUOROETHYLENE GRAFT;  Surgeon: Shade Owens MD;  Location:  OR     C APPENDECTOMY      Appendectomy     C NONSPECIFIC PROCEDURE  04/2000    Open Biopsy Left Thigh Liposarcoma     COLONOSCOPY N/A 2/5/2016    Procedure: COMBINED COLONOSCOPY, SINGLE OR MULTIPLE BIOPSY/POLYPECTOMY BY BIOPSY;  Surgeon: Varun Stanley MD, MD;  Location:  GI     CYSTOSCOPY       EXCISION MALIG LESION>1.25CM  5/2000    Myxoid Liposarcoma       EXPLORE GROIN Right 5/1/2018    Procedure: EXPLORE GROIN;  EMERGENCY RIGHT FEMORAL EXPLORATION WITH FEMORAL ARTERY REPAIR.    EBL: 50mL;  Surgeon: Shade Owens MD;  Location:  OR      COLONOSCOPY THRU STOMA, DIAGNOSTIC  2006    due 2010     HC COLP CERVIX/UPPER VAGINA  07/1997    Negative     HC DILATION/CURETTAGE DIAG/THER NON OB  02/1997    Post menopausal bleeding on HRT, negative     HIP SURGERY Left 04/13/2019     IR ANGIOGRAM THROUGH CATHETER FOLLOW UP  12/20/2018     IR ANGIOGRAM THROUGH CATHETER FOLLOW UP  12/21/2018     IR LOWER EXTREMITY ANGIOGRAM LEFT  12/19/2018     VASCULAR SURGERY  04/30/2018    Left SFA stent in bypass graft               Social History:     Social  History     Tobacco Use     Smoking status: Never Smoker     Smokeless tobacco: Never Used   Substance Use Topics     Alcohol use: No     Frequency: Monthly or less     Drinks per session: 1 or 2     Binge frequency: Never             Family History:     Family History   Problem Relation Age of Onset     C.A.D. Father         MI 57     Alcohol/Drug Father         etoh     Obesity Mother      Osteoporosis Mother      Colon Cancer Brother 70     Hyperlipidemia Son      Hyperlipidemia Son         very high, experimental drug     C.A.D. Paternal Grandmother         ascvd     Diabetes Maternal Grandmother      Cancer Maternal Grandmother      C.A.D. Paternal Uncle         Mi  age 48     Cancer Maternal Aunt         pancreatic CA             Allergies:     Allergies   Allergen Reactions     Celexa [Citalopram Hydrobromide]      Decreased libido             Medications:     Medications Prior to Admission   Medication Sig Dispense Refill Last Dose     Acetaminophen 325 MG CAPS Take 500 mg by mouth 2 times daily as needed    10/3/2019 at Unknown time     alendronate (FOSAMAX) 70 MG tablet Take 1 tablet (70 mg) by mouth with 8oz water every 7 days 30 minutes before breakfast and remain upright during this time. 12 tablet 3 10/3/2019 at Unknown time     aspirin (ASA) 81 MG chewable tablet Take 81 mg by mouth daily   2019     calcium carbonate 600 mg-vitamin D 400 units (CALTRATE) 600-400 MG-UNIT per tablet Take 1 chew tab by mouth daily   10/3/2019 at Unknown time     clopidogrel (PLAVIX) 75 MG tablet Take 1 tablet (75 mg) by mouth daily 90 tablet 3 2019     ezetimibe (ZETIA) 10 MG tablet Take 1 tablet (10 mg) by mouth daily 90 tablet 3 10/3/2019 at Unknown time     levothyroxine (SYNTHROID/LEVOTHROID) 25 MCG tablet Take 1 tablet (25 mcg) by mouth daily 90 tablet 0 10/3/2019 at Unknown time     multivitamin, therapeutic (THERA-VIT) TABS tablet Take 1 tablet by mouth daily   10/3/2019 at Unknown time      "nitroFURantoin macrocrystal-monohydrate (MACROBID) 100 MG capsule Take 1 tablet after intercourse 30 capsule 3 9/27/2019     oxybutynin ER (DITROPAN-XL) 5 MG 24 hr tablet Take 1 tablet (5 mg) by mouth daily 90 tablet 1 10/2/2019     rosuvastatin (CRESTOR) 40 MG tablet Take 1 tablet (40 mg) by mouth every evening 90 tablet 3 10/3/2019 at Unknown time     azelastine (ASTELIN) 0.1 % nasal spray Spray 1 spray in nostril daily as needed   10 Unknown at Unknown time     order for DME Equipment being ordered: Walker Wheels () and Walker ()  Treatment Diagnosis: difficulty walking 1 each 0 Taking     order for DME Equipment being ordered: Left Thigh High Compression Stocking, 550 CCL.3 1 each 99 Taking     ORDER FOR DME Equipment being ordered: lymphedema bandages 2 Device 1 Taking               Review of Systems:   A comprehensive greater than 10 system review of systems was carried out.  Pertinent positives and negatives are noted above.  Otherwise negative for contributory info.            Physical Exam:   Vitals were reviewed  Blood pressure 109/55, pulse 65, temperature 95  F (35  C), resp. rate 11, height 1.651 m (5' 5\"), weight 69.9 kg (154 lb), last menstrual period 03/18/2005, SpO2 98 %, not currently breastfeeding.  Exam:   GENERAL:  Comfortable.  PSYCH: pleasant, oriented, No acute distress.  EYES: PERRLA, Normal conjunctiva.  HEART:  Normal S1, S2 with no edema.  LUNGS:  Clear to auscultation, normal Respiratory effort.  ABDOMEN:  Soft, no hepatosplenomegaly, normal bowel sounds.  SKIN:  Dry to touch, No rash.  Neuro: Non focal with normal motor power, sensation, CN's and Reflexes.           Data:   Past month labs, studies, and imaging were reviewed.  All laboratory data reviewed    "

## 2019-10-05 ENCOUNTER — APPOINTMENT (OUTPATIENT)
Dept: PHYSICAL THERAPY | Facility: CLINIC | Age: 69
DRG: 470 | End: 2019-10-05
Attending: ORTHOPAEDIC SURGERY
Payer: COMMERCIAL

## 2019-10-05 ENCOUNTER — APPOINTMENT (OUTPATIENT)
Dept: OCCUPATIONAL THERAPY | Facility: CLINIC | Age: 69
DRG: 470 | End: 2019-10-05
Attending: ORTHOPAEDIC SURGERY
Payer: COMMERCIAL

## 2019-10-05 LAB
HGB BLD-MCNC: 7.7 G/DL (ref 11.7–15.7)
HGB BLD-MCNC: 7.8 G/DL (ref 11.7–15.7)

## 2019-10-05 PROCEDURE — 85018 HEMOGLOBIN: CPT | Performed by: ORTHOPAEDIC SURGERY

## 2019-10-05 PROCEDURE — 99232 SBSQ HOSP IP/OBS MODERATE 35: CPT | Performed by: INTERNAL MEDICINE

## 2019-10-05 PROCEDURE — 97530 THERAPEUTIC ACTIVITIES: CPT | Mod: GP

## 2019-10-05 PROCEDURE — 97116 GAIT TRAINING THERAPY: CPT | Mod: GP

## 2019-10-05 PROCEDURE — 36415 COLL VENOUS BLD VENIPUNCTURE: CPT | Performed by: ORTHOPAEDIC SURGERY

## 2019-10-05 PROCEDURE — 97535 SELF CARE MNGMENT TRAINING: CPT | Mod: GO | Performed by: OCCUPATIONAL THERAPIST

## 2019-10-05 PROCEDURE — 97166 OT EVAL MOD COMPLEX 45 MIN: CPT | Mod: GO | Performed by: OCCUPATIONAL THERAPIST

## 2019-10-05 PROCEDURE — 97140 MANUAL THERAPY 1/> REGIONS: CPT | Mod: GP

## 2019-10-05 PROCEDURE — 25000132 ZZH RX MED GY IP 250 OP 250 PS 637: Performed by: ORTHOPAEDIC SURGERY

## 2019-10-05 PROCEDURE — 12000000 ZZH R&B MED SURG/OB

## 2019-10-05 PROCEDURE — 97110 THERAPEUTIC EXERCISES: CPT | Mod: GP

## 2019-10-05 PROCEDURE — 25000128 H RX IP 250 OP 636: Performed by: ORTHOPAEDIC SURGERY

## 2019-10-05 RX ADMIN — ENOXAPARIN SODIUM 40 MG: 40 INJECTION SUBCUTANEOUS at 08:26

## 2019-10-05 RX ADMIN — OXYCODONE HYDROCHLORIDE 10 MG: 5 TABLET ORAL at 08:24

## 2019-10-05 RX ADMIN — CLOPIDOGREL BISULFATE 75 MG: 75 TABLET, FILM COATED ORAL at 08:22

## 2019-10-05 RX ADMIN — ACETAMINOPHEN 975 MG: 325 TABLET, FILM COATED ORAL at 06:34

## 2019-10-05 RX ADMIN — EZETIMIBE 10 MG: 10 TABLET ORAL at 08:23

## 2019-10-05 RX ADMIN — SENNOSIDES AND DOCUSATE SODIUM 2 TABLET: 8.6; 5 TABLET ORAL at 08:21

## 2019-10-05 RX ADMIN — OXYCODONE HYDROCHLORIDE 10 MG: 5 TABLET ORAL at 04:22

## 2019-10-05 RX ADMIN — ONDANSETRON HYDROCHLORIDE 4 MG: 2 INJECTION, SOLUTION INTRAMUSCULAR; INTRAVENOUS at 04:22

## 2019-10-05 RX ADMIN — CELECOXIB 100 MG: 100 CAPSULE ORAL at 08:21

## 2019-10-05 RX ADMIN — ACETAMINOPHEN 975 MG: 325 TABLET, FILM COATED ORAL at 21:12

## 2019-10-05 RX ADMIN — ACETAMINOPHEN 975 MG: 325 TABLET, FILM COATED ORAL at 14:49

## 2019-10-05 RX ADMIN — OXYCODONE HYDROCHLORIDE 5 MG: 5 TABLET ORAL at 21:12

## 2019-10-05 RX ADMIN — RANITIDINE 150 MG: 150 TABLET ORAL at 20:11

## 2019-10-05 RX ADMIN — ROSUVASTATIN CALCIUM 40 MG: 20 TABLET, FILM COATED ORAL at 20:11

## 2019-10-05 RX ADMIN — RANITIDINE 150 MG: 150 TABLET ORAL at 08:22

## 2019-10-05 RX ADMIN — OXYBUTYNIN CHLORIDE 5 MG: 5 TABLET, EXTENDED RELEASE ORAL at 08:23

## 2019-10-05 RX ADMIN — SENNOSIDES AND DOCUSATE SODIUM 2 TABLET: 8.6; 5 TABLET ORAL at 20:11

## 2019-10-05 RX ADMIN — OXYCODONE HYDROCHLORIDE 5 MG: 5 TABLET ORAL at 13:00

## 2019-10-05 RX ADMIN — OXYCODONE HYDROCHLORIDE 5 MG: 5 TABLET ORAL at 17:06

## 2019-10-05 RX ADMIN — THERA TABS 1 TABLET: TAB at 08:20

## 2019-10-05 RX ADMIN — HYDROXYZINE HYDROCHLORIDE 25 MG: 25 TABLET, FILM COATED ORAL at 23:47

## 2019-10-05 RX ADMIN — ONDANSETRON HYDROCHLORIDE 4 MG: 2 INJECTION, SOLUTION INTRAMUSCULAR; INTRAVENOUS at 11:32

## 2019-10-05 RX ADMIN — CELECOXIB 100 MG: 100 CAPSULE ORAL at 20:11

## 2019-10-05 ASSESSMENT — ACTIVITIES OF DAILY LIVING (ADL)
ADLS_ACUITY_SCORE: 15
ADLS_ACUITY_SCORE: 15
ADLS_ACUITY_SCORE: 14

## 2019-10-05 NOTE — PROGRESS NOTES
SPIRITUAL HEALTH SERVICES  [facility] Floor 6  ON-CALL VISIT     DATA:    Pt recovering from hip surgery, will be discharged on Monday.  Pt said it was reported her surgery could not have gone better.  Requested  visit     INTERVENTION:    Introduced self and SHS. Reflective conversation, life review, prayer.        ASSESSMENT:    Pt expressed no acute emotional or spiritual distress.  Expressed strong spiritual support through local [employer]. Spouse was present and very supportive.  Pt expressed gratitude for [place] and the decision to stay longer than originally discussed.       PLAN:    No follow up necessary unless there is a request/referral.  Shade Sears  Chaplain Resident

## 2019-10-05 NOTE — CONSULTS
Care Transition Initial Assessment - SW     Met with: PATIENT and her  Chris    Active Problems:    S/P total hip arthroplasty       DATA  Lives With: spouse   Living Arrangements: house  Quality of Family Relationships: helpful, involved, supportive  Description of Support System: Supportive, Involved  Who is your support system?:    Quality of Family Relationships: helpful, involved, supportive  Transportation Anticipated: family or friend will provide    ASSESSMENT  Cognitive Status:  A&O.  Met with pt and her  Chris.  They have 3 adult children who live in the [place] area.  Prior to hospitalization she was independent with all ADL's.  She denies any falls, uses a walker and a cane.  No previous TCU or HC services.     PLAN  Financial costs for the patient includes: unknown   Patient given options and choices for discharge: discharge plans uncertain still waiting for PT to see patient   Patient/family is agreeable to the plan? NA at this time since discharge plan uncertain   Transportation/person available to transport on day of discharge  is pt's    Patient Goals and Preferences: restorative   Patient anticipates discharging to: home

## 2019-10-05 NOTE — PROGRESS NOTES
"Orthopedic Surgery  10/5/2019    S: Patient voices no complaints today.     O: Blood pressure 106/52, pulse 79, temperature 96.6  F (35.9  C), temperature source Oral, resp. rate 16, height 1.651 m (5' 5\"), weight 69.9 kg (154 lb), last menstrual period 03/18/2005, SpO2 99 %, not currently breastfeeding.  Lab Results   Component Value Date    HGB 12.8 09/27/2019     Lab Results   Component Value Date    INR 1.01 09/27/2019        Neurovascularly intact.  Calves are negative bilaterally, both soft and nontender.  The wound is C/D/I.  The wound looks good with minimal erythema of the surrounding skin.    A: Ms. Jacobson is doing well status post Procedure(s):  Hardware removal hardware left hip and left total hip arthroplasty.    P: Continue physical therapy for lymphedema and mobility   Anticoagulation with lovenox, restart plavix  Pain management  Discharge planning, likely home on Monday    Spencer Celeste MD  [phone]    "

## 2019-10-05 NOTE — PLAN OF CARE
A/O, VSS pain 3/10 oxycodone given for pain control.  Dangled and stood at bedside no light headiness and dizziness.  Mild nausea on scheduled zofran.  Lymphedema wrap intact to LLE.  [employer] dc'd due to void.

## 2019-10-05 NOTE — PLAN OF CARE
Nurse from [id number]  Pt arrived back from surgery around 1500. Pt very lethargic when she arrived. Pts  present. Pt aroused to voice or light touch. Pt is a little hard of hearing. Pt denied nausea and min pain. Capnography on. Pt and  educated on new orders. Pt more alert around 1800 and she ate some soup. Pt given her first oxycodone after meal and the plan is to dangle at hs.  Chris is very supportive. Home vs rehab on discharge.

## 2019-10-05 NOTE — PLAN OF CARE
Patient plan: Home Monday per spouse  Current status: LLE edema at baseline, non-pitting. Baseline compressions donned, spouse in an able to assist and pt able to direct wear schedule and don/doffing. Pt will direct lymph cares from now on and will complete order. Performed GISSELL exercises, sharp quad pain and limited ROM. Min-A supine>sit, SBA sit<>stand. Pt ambulated 60' SBA with FWW, step-to pattern, lightheadedness towards end of gait, assisted to bedside chair. BP 91/54 seated, symptoms begin to resolve and BP up to 109/46 following 3 min. Pt up in chair with spouse supervising waiting for OT, nursing notified of status.  Anticipated status at discharge: Unable to accurately assess given current pt presentation with profound lethargy. Will update as appropriate.

## 2019-10-05 NOTE — PLAN OF CARE
Patient plan: home with spouse  Current status: Pt. Completed sit to stand with SBA.  Pt. Completed commode transfer with CGA due to increased nausea.  Pt. Able to ambulate in room with SBA and FWW.  Pt. Needing min A for sit to supine to lift L LE.  Pt. reported feeling better once in bed and appreciative of care.  Anticipated status at discharge: pt. Plans to have assist from spouse for LE dressing, SBA with toilet transfer with grab bars and elevated toilet, CGA with tub transfer with tub shower and shower chair, SBA to modified independence with grooming/hygiene at sink.

## 2019-10-05 NOTE — PROGRESS NOTES
"[facility]  Hospitalist Progress Note          Assessment and Plan:   69-year-old woman admitted to the hospital for hardware removal and total hip arthroplasty.This is an elective surgery scheduled, she denies any recent medical concern.  Were consulted to see the patient for medical management while she is in the hopital.   1. Left hip arthroplasty on October 4, 2019  2. Blood loss anemia needs further monitoring  3. Femoral-popliteal bypass history  4. History of sarcoma left thigh status post resection  5. Hyperlipidemia  6. Depression      Follow-up hemoglobin, if below 7 transfuse with 1 unit of packed red blood cells  Medically the patient is stable. Continue same medication.  Patient is seen with the orthopedics service for medical management. Post-op surgical wound care, pain control, activity and DVT prophylaxis per orthopedist.        Interval History:   Doing well   Denies complaints  Pain is reasonably controlled  No CP or SOB    Discussed with patient                      Physical Exam:   Blood pressure 112/42, pulse 87, temperature 97.3  F (36.3  C), temperature source Axillary, resp. rate 16, height 1.651 m (5' 5\"), weight 69.9 kg (154 lb), last menstrual period 03/18/2005, SpO2 100 %, not currently breastfeeding.  Exam:   GENERAL:  Comfortable.  PSYCH: pleasant, oriented, No acute distress.  EYES: PERRLA, Normal conjunctiva.  HEART:  Normal S1, S2 with no edema.  LUNGS:  Clear to auscultation, normal Respiratory effort.  ABDOMEN:  Soft, no hepatosplenomegaly, normal bowel sounds.  SKIN:  Dry to touch, No rash.            Data:        Lab Results   Component Value Date     09/27/2019    Lab Results   Component Value Date    CHLORIDE 106 09/27/2019    Lab Results   Component Value Date    BUN 14 09/27/2019      Lab Results   Component Value Date    POTASSIUM 3.9 09/27/2019    Lab Results   Component Value Date    CO2 29 09/27/2019    Lab Results   Component Value Date    CR 0.50 " 10/04/2019        Lab Results   Component Value Date    WBC 6.3 09/27/2019    HGB 7.8 (L) 10/05/2019    HCT 39.4 09/27/2019    MCV 92 09/27/2019     10/04/2019       This document was produced using voice recognition software

## 2019-10-05 NOTE — PROGRESS NOTES
10/05/19 1200   Quick Adds   Type of Visit Initial Occupational Therapy Evaluation   Living Environment   Lives With spouse   Living Arrangements house   Home Accessibility other (see comments)  (tub, walkin shower)   Transportation Anticipated family or friend will provide;car, drives self   Living Environment Comment Pt. bedroom upstairs, walkin shower downstairs, pt. and spouse looking to remodel bathroom to take out tub and put in walk in shower   Self-Care   Usual Activity Tolerance moderate   Current Activity Tolerance fair   Regular Exercise   (used to walk 1 mile a day)   Equipment Currently Used at Home walker, standard;shower chair   Activity/Exercise/Self-Care Comment Pt. reports independence at baseline with increased time to complete ADLs.  After initial surgery, pt. had spouse assist with dressing and setup as needed.   Functional Level   Ambulation 1-->assistive equipment   Transferring 0-->independent   Toileting 0-->independent   Bathing 1-->assistive equipment   Dressing 0-->independent   Eating 0-->independent   Communication 0-->understands/communicates without difficulty   Swallowing 0-->swallows foods/liquids without difficulty   Cognition 0 - no cognition issues reported   Fall history within last six months no   Prior Functional Level Comment Started to use SEC since hip fx in April. Spouse reports progressed well after intial fx, no issue with stairs   General Information   Onset of Illness/Injury or Date of Surgery - Date 10/04/19   Referring Physician Spencer Celeste   Patient/Family Goals Statement reduce nausea and increase movement   Additional Occupational Profile Info/Pertinent History of Current Problem Hardware removal hardware left hip and left total hip arthroplasty   Precautions/Limitations fall precautions   General Observations Pt. seated up in chair with spouse present.   Cognitive Status Examination   Orientation orientation to person, place and time   Level of  Consciousness alert   Visual Perception   Visual Perception No deficits were identified   Pain Assessment   Patient Currently in Pain Yes, see Vital Sign flowsheet   Range of Motion (ROM)   ROM Comment UE WFL   Strength   Strength Comments UE WFL   Coordination   Upper Extremity Coordination No deficits were identified   Transfer Skills   Transfer Comments SBA-CGA from chair   Transfer Skill: Toilet Transfer   Level of [place]: Toilet contact guard   Physical Assist/Nonphysical Assist: Toilet supervision;set-up required   Weight-Bearing Restrictions: Toilet weight-bearing as tolerated   Assistive Device standard walker;seat riser;grab bars   Activities of Daily Living Analysis   Impairments Contributing to Impaired Activities of Daily Living ROM decreased;post surgical precautions;pain   General Therapy Interventions   Planned Therapy Interventions ADL retraining;transfer training;progressive activity/exercise   Clinical Impression   Criteria for Skilled Therapeutic Interventions Met yes, treatment indicated   OT Diagnosis impaired independence with ADLs   Influenced by the following impairments see above   Assessment of Occupational Performance 3-5 Performance Deficits   Identified Performance Deficits decreased LE dressing, decreased functional transfers for toileting and showering, decreased standing tolerance for grooming/hygiene   Clinical Decision Making (Complexity) Moderate complexity   Therapy Frequency Daily   Predicted Duration of Therapy Intervention (days/wks) 3 days   Anticipated Equipment Needs at Discharge reacher;shower chair;sock aide;raised toilet seat   Anticipated Discharge Disposition Home with Assist   Risks and Benefits of Treatment have been explained. Yes   Patient, Family & other staff in agreement with plan of care Yes   Clinical Impression Comments Pt. is a 68 yo female L hardware removal and GISSELL who presents with increased pain, nausea, decreased Hgb, and decreased BP impairing  "previuos level of independence and participation in ADLs.  Pt. lives in multilevel home with spouse with tub shower, stairs, and bedroom upstairs.  Skilled IP OT necessary for further ADL training, transfer training, and progressive activity for safe discharge.   [facility] TM \"6 Clicks\"   2016, Trustees of [facility], under license to [employer]  All rights reserved.   6 Clicks Short Forms Daily Activity Inpatient Short Form   [facility]  \"6 Clicks\" Daily Activity Inpatient Short Form   1. Putting on and taking off regular lower body clothing? 2 - A Lot   2. Bathing (including washing, rinsing, drying)? 2 - A Lot   3. Toileting, which includes using toilet, bedpan or urinal? 3 - A Little   4. Putting on and taking off regular upper body clothing? 4 - None   5. Taking care of personal grooming such as brushing teeth? 4 - None   6. Eating meals? 4 - None   Daily Activity Raw Score (Score out of 24.Lower scores equate to lower levels of function) 19   Total Evaluation Time   Total Evaluation Time (Minutes) 10     "

## 2019-10-06 ENCOUNTER — APPOINTMENT (OUTPATIENT)
Dept: OCCUPATIONAL THERAPY | Facility: CLINIC | Age: 69
DRG: 470 | End: 2019-10-06
Attending: ORTHOPAEDIC SURGERY
Payer: COMMERCIAL

## 2019-10-06 ENCOUNTER — APPOINTMENT (OUTPATIENT)
Dept: PHYSICAL THERAPY | Facility: CLINIC | Age: 69
DRG: 470 | End: 2019-10-06
Attending: ORTHOPAEDIC SURGERY
Payer: COMMERCIAL

## 2019-10-06 LAB
GLUCOSE SERPL-MCNC: 139 MG/DL (ref 70–99)
HGB BLD-MCNC: 7.4 G/DL (ref 11.7–15.7)
HGB BLD-MCNC: 7.9 G/DL (ref 11.7–15.7)

## 2019-10-06 PROCEDURE — 82947 ASSAY GLUCOSE BLOOD QUANT: CPT | Performed by: ORTHOPAEDIC SURGERY

## 2019-10-06 PROCEDURE — 97116 GAIT TRAINING THERAPY: CPT | Mod: GP | Performed by: PHYSICAL THERAPY ASSISTANT

## 2019-10-06 PROCEDURE — 25000132 ZZH RX MED GY IP 250 OP 250 PS 637: Performed by: ORTHOPAEDIC SURGERY

## 2019-10-06 PROCEDURE — 97530 THERAPEUTIC ACTIVITIES: CPT | Mod: GP | Performed by: PHYSICAL THERAPY ASSISTANT

## 2019-10-06 PROCEDURE — 36415 COLL VENOUS BLD VENIPUNCTURE: CPT | Performed by: ORTHOPAEDIC SURGERY

## 2019-10-06 PROCEDURE — 99232 SBSQ HOSP IP/OBS MODERATE 35: CPT | Performed by: INTERNAL MEDICINE

## 2019-10-06 PROCEDURE — 97535 SELF CARE MNGMENT TRAINING: CPT | Mod: GO | Performed by: OCCUPATIONAL THERAPIST

## 2019-10-06 PROCEDURE — 12000000 ZZH R&B MED SURG/OB

## 2019-10-06 PROCEDURE — 85018 HEMOGLOBIN: CPT | Performed by: ORTHOPAEDIC SURGERY

## 2019-10-06 PROCEDURE — 97110 THERAPEUTIC EXERCISES: CPT | Mod: GP | Performed by: PHYSICAL THERAPY ASSISTANT

## 2019-10-06 PROCEDURE — 25000128 H RX IP 250 OP 636: Performed by: ORTHOPAEDIC SURGERY

## 2019-10-06 RX ADMIN — CELECOXIB 100 MG: 100 CAPSULE ORAL at 09:50

## 2019-10-06 RX ADMIN — OXYCODONE HYDROCHLORIDE 5 MG: 5 TABLET ORAL at 12:36

## 2019-10-06 RX ADMIN — ROSUVASTATIN CALCIUM 40 MG: 20 TABLET, FILM COATED ORAL at 20:50

## 2019-10-06 RX ADMIN — LEVOTHYROXINE SODIUM 25 MCG: 25 TABLET ORAL at 06:25

## 2019-10-06 RX ADMIN — THERA TABS 1 TABLET: TAB at 09:51

## 2019-10-06 RX ADMIN — ENOXAPARIN SODIUM 40 MG: 40 INJECTION SUBCUTANEOUS at 09:50

## 2019-10-06 RX ADMIN — HYDROXYZINE HYDROCHLORIDE 25 MG: 25 TABLET, FILM COATED ORAL at 09:51

## 2019-10-06 RX ADMIN — RANITIDINE 150 MG: 150 TABLET ORAL at 09:51

## 2019-10-06 RX ADMIN — OXYCODONE HYDROCHLORIDE 5 MG: 5 TABLET ORAL at 08:29

## 2019-10-06 RX ADMIN — CLOPIDOGREL BISULFATE 75 MG: 75 TABLET, FILM COATED ORAL at 09:50

## 2019-10-06 RX ADMIN — ACETAMINOPHEN 975 MG: 325 TABLET, FILM COATED ORAL at 16:50

## 2019-10-06 RX ADMIN — OXYBUTYNIN CHLORIDE 5 MG: 5 TABLET, EXTENDED RELEASE ORAL at 09:51

## 2019-10-06 RX ADMIN — SENNOSIDES AND DOCUSATE SODIUM 2 TABLET: 8.6; 5 TABLET ORAL at 09:50

## 2019-10-06 RX ADMIN — ACETAMINOPHEN 975 MG: 325 TABLET, FILM COATED ORAL at 08:29

## 2019-10-06 RX ADMIN — OXYCODONE HYDROCHLORIDE 5 MG: 5 TABLET ORAL at 20:51

## 2019-10-06 RX ADMIN — OXYCODONE HYDROCHLORIDE 5 MG: 5 TABLET ORAL at 04:11

## 2019-10-06 RX ADMIN — HYDROXYZINE HYDROCHLORIDE 25 MG: 25 TABLET, FILM COATED ORAL at 23:03

## 2019-10-06 RX ADMIN — RANITIDINE 150 MG: 150 TABLET ORAL at 20:50

## 2019-10-06 RX ADMIN — SENNOSIDES AND DOCUSATE SODIUM 2 TABLET: 8.6; 5 TABLET ORAL at 20:50

## 2019-10-06 RX ADMIN — OXYCODONE HYDROCHLORIDE 5 MG: 5 TABLET ORAL at 16:50

## 2019-10-06 RX ADMIN — EZETIMIBE 10 MG: 10 TABLET ORAL at 09:51

## 2019-10-06 ASSESSMENT — ACTIVITIES OF DAILY LIVING (ADL)
ADLS_ACUITY_SCORE: 15
ADLS_ACUITY_SCORE: 15
ADLS_ACUITY_SCORE: 14
ADLS_ACUITY_SCORE: 15
ADLS_ACUITY_SCORE: 14
ADLS_ACUITY_SCORE: 14

## 2019-10-06 NOTE — PLAN OF CARE
Day shift until 1900  Pt alert and orientated. Pt got a little dizzy with her first PT session and mentioned that she felt a little drowsy and not clear after pain medication. Pt had 10 mg of oxycodone prior to am PT. Pt agreed to try only 5 mg of oxycodone before the next PT session. Pt stated min pain while in bed but just hurts when she was walks some. Pt felt one tablet was enough. Pt voided twice since thompson removed with out problems. Pt ambulated in [place] with assist of 1.  present most of the shift and very supportive. Plan is home on Monday.

## 2019-10-06 NOTE — PLAN OF CARE
Discharge Planner OT   Patient plan for discharge: home with spouse  Current status: Pt. needing min A with L LE to scoot over in bed and then pt. able to sit to stand with SBA.  Pt. noting pain 3-4 at rest and 4 with movement.  Pt. abl to stand slowly, march in place, and then take slow steps to bathroom.  Pt. states has been using toilet in the bathroom with SBA from NSG.  Pt. able to stand at sink and reach for supplies and complete normal morning routine using FWW.  Pt. able to ambulate slowly back to chair and sit with SBA to modified independence.  Pt. reports pain at 3 but concerned about pain for upcoming PT session.  NSG notified.  Anticipated status at discharge: pt. Plans to have assist from spouse for LE dressing, SBA with toilet transfer with grab bars and elevated toilet, CGA with tub transfer with tub shower and shower chair, SBA to modified independence with grooming/hygiene at sink.       Entered by: Lakshmi Martinez 10/06/2019 8:24 AM

## 2019-10-06 NOTE — PLAN OF CARE
Patient plan: home tomorrow  Current status: Min A for bed mobility and leg  gait to 20 feet up and down 4 steps 2 rails  Anticipated status at discharge: gait ti household needs Min to CGA for bed mobility with AE and S for stairs

## 2019-10-06 NOTE — PLAN OF CARE
RN [id number]  A/O, temp max this shift 100.6. encouraged IS on scheduled tylenol.  Pain 5/10 atarax and oxycodone for pain control.  No nausea this shift.  Up with assist of 1 with gait belt and walker voiding adequate.Plans to discharge home on monday

## 2019-10-06 NOTE — PLAN OF CARE
Alert and oriented. Tolerating regular diet. Transfers with Ax1, gait belt, and walker. Dressing has moderate amount drainage. Moderate edema to LLE, lymphedema stocking on. Voiding in adequate amounts. Pain managed with scheduled Tylenol and prn Oxycodone. Hgb recheck was 7.9. Plans d/c to home.

## 2019-10-06 NOTE — PROGRESS NOTES
"               [facility]  Hospitalist Progress Note  Name: Angeli Jacobson    MRN: [id number]  YOB: 1950    Age: 69 year old  Date of admission: 10/4/2019  Primary care provider: Sepideh Burris      Reason for Stay (Diagnosis): Left total hip arthroplasty         Assessment and Plan:      Summary of Stay:  69-year-old woman admitted to the hospital for hardware removal and total hip arthroplasty.This is an elective surgery scheduled, she denies any recent medical concern.  Were consulted to see the patient for medical management while she is in the hopital.     1. Left total hip arthroplasty on October 4, 2019  2. Acute blood loss anemia needs further monitoring: Agree with repeating hemoglobin this afternoon and would only transfuse if hemoglobin is less than 7  3. Femoral-popliteal bypass history  4. History of sarcoma left thigh status post resection  5. Hyperlipidemia  6. Depression       Medically the patient is stable. Continue same medication.    Patient is seen with the orthopedics service for medical management. Post-op surgical wound care, pain control, activity and DVT prophylaxis per orthopedist.     If hemoglobin is stable and not requiring transfusion, okay to discharge from medicine perspective tomorrow      Interval History (Subjective):      No specific complaints.  Denies any melena.  No orthostasis, lightheadedness.  Denies chest pain.         Physical Exam:      Vital signs:  Temp: 98.1  F (36.7  C) Temp src: Oral BP: 114/52 Pulse: 87 Heart Rate: 89 Resp: 16 SpO2: 94 % O2 Device: None (Room air) Oxygen Delivery: 2 LPM Height: 165.1 cm (5' 5\") Weight: 69.9 kg (154 lb)  Estimated body mass index is 25.63 kg/m  as calculated from the following:    Height as of this encounter: 1.651 m (5' 5\").    Weight as of this encounter: 69.9 kg (154 lb).    I/O last 3 completed shifts:  In: 920 [P.O.:920]  Out: 925 [Urine:925]  Vitals:    10/04/19 0541   Weight: 69.9 kg (154 " lb)       Constitutional: Awake, alert, cooperative, no apparent distress   Respiratory: Nl work of breathing. Clear to auscultation bilaterally, no crackles or wheezing   Cardiovascular: Regular rate and rhythm, normal S1 and S2, and no murmur noted   Abdomen: Normal bowel sounds, soft, non-distended, non-tender   Skin: No rashes, no cyanosis, dry to touch   Neuro: CN 2-12 intact, no localizing weakness   Extremities: No edema, normal range of motion   HEENT Normocephalic, atraumatic, normal nasal turbinates; oropharynx clear   Neck Supple; nl inspection; trachea midline; no thryomegaly   Psychiatric: A+O x3. Normal affect          Medications:      All current medications were reviewed with changes reflected in problem list.         Data:      All new lab and imaging data was reviewed.   Labs:  Recent Labs   Lab 10/06/19  0605 10/05/19  1637 10/05/19  0758 10/04/19  1522   HGB 7.4* 7.7* 7.8*  --    PLT  --   --   --  217     Recent Labs   Lab 10/06/19  0605 10/04/19  1522   *  --    CR  --  0.50*   GFRESTIMATED  --  >90   GFRESTBLACK  --  >90     No results for input(s): INR in the last 168 hours.   Imaging:   No results found for this or any previous visit (from the past 24 hour(s)).    Katie Ford [phone]

## 2019-10-06 NOTE — PROGRESS NOTES
Orthopedic Surgery  Angeli Jacobson  10/6/2019  Admit Date:  10/4/2019  POD # 2 conversion of internal fixation to GISSELL    Angeli Jacobson is a 68 y/o female whom was rounded on 2 days s/p GISSELL.  Pain controlled.  Tolerating oral intake.  No events overnight. The patient denies chest pain, SOB, calf pain.    Alert and orient to person, place, and time.  Vital Sign Ranges  Temperature Temp  Av.8  F (37.1  C)  Min: 97.3  F (36.3  C)  Max: 100.6  F (38.1  C)   Blood pressure Systolic (24hrs), Av , Min:112 , Max:130        Diastolic (24hrs), Av, Min:41, Max:56      Pulse Pulse  Av  Min: 87  Max: 87   Respirations Resp  Av.6  Min: 16  Max: 18   Pulse oximetry SpO2  Av %  Min: 94 %  Max: 99 %       Left hip dressing is clean, dry, and intact. Minimal erythema of the surrounding skin and no signs of infection  Bilateral calves are soft, non-tender.  bilateral lower extremity is NVI. 2+ DP/PT pulses   Able to actively move distal extremity  5/5 strength distally    Labs:  Recent Labs   Lab Test 19  0929 19  1530 19  1243   POTASSIUM 3.9 3.7 4.2     Recent Labs   Lab Test 10/06/19  0605 10/05/19  1637 10/05/19  0758   HGB 7.4* 7.7* 7.8*     Recent Labs   Lab Test 19  0929 18  1241 18  1700   INR 1.01 0.97 1.43*     Recent Labs   Lab Test 10/04/19  1522 19  0929 19  1530    280 300       A/P  1. POD #2 conversion left internal fixation to total hip   Continue Lovenox and Plavix for DVT prophylaxis.     Mobilize with PT/OT WBAT.     Continue current pain regiment.   Rechecking hemoglobin today but no complaints of lightheaded, dizzy, etc so holding on blood transfusion.     2. Disposition   Anticipate d/c to home tomorrow pending continual strengthening and ambulation with PT.    Varun Kline PA-C  [phone]

## 2019-10-07 ENCOUNTER — APPOINTMENT (OUTPATIENT)
Dept: PHYSICAL THERAPY | Facility: CLINIC | Age: 69
DRG: 470 | End: 2019-10-07
Attending: ORTHOPAEDIC SURGERY
Payer: COMMERCIAL

## 2019-10-07 ENCOUNTER — APPOINTMENT (OUTPATIENT)
Dept: OCCUPATIONAL THERAPY | Facility: CLINIC | Age: 69
DRG: 470 | End: 2019-10-07
Attending: ORTHOPAEDIC SURGERY
Payer: COMMERCIAL

## 2019-10-07 VITALS
SYSTOLIC BLOOD PRESSURE: 107 MMHG | DIASTOLIC BLOOD PRESSURE: 59 MMHG | HEART RATE: 99 BPM | OXYGEN SATURATION: 94 % | HEIGHT: 65 IN | RESPIRATION RATE: 16 BRPM | WEIGHT: 154 LBS | TEMPERATURE: 98.2 F | BODY MASS INDEX: 25.66 KG/M2

## 2019-10-07 LAB
COPATH REPORT: NORMAL
HGB BLD-MCNC: 7 G/DL (ref 11.7–15.7)
HGB BLD-MCNC: 7.4 G/DL (ref 11.7–15.7)
PLATELET # BLD AUTO: 189 10E9/L (ref 150–450)

## 2019-10-07 PROCEDURE — 25000128 H RX IP 250 OP 636: Performed by: ORTHOPAEDIC SURGERY

## 2019-10-07 PROCEDURE — 97535 SELF CARE MNGMENT TRAINING: CPT | Mod: GO | Performed by: OCCUPATIONAL THERAPIST

## 2019-10-07 PROCEDURE — 85018 HEMOGLOBIN: CPT | Performed by: INTERNAL MEDICINE

## 2019-10-07 PROCEDURE — 25000132 ZZH RX MED GY IP 250 OP 250 PS 637: Performed by: ORTHOPAEDIC SURGERY

## 2019-10-07 PROCEDURE — 85049 AUTOMATED PLATELET COUNT: CPT | Performed by: ORTHOPAEDIC SURGERY

## 2019-10-07 PROCEDURE — 97530 THERAPEUTIC ACTIVITIES: CPT | Mod: GP | Performed by: PHYSICAL THERAPY ASSISTANT

## 2019-10-07 PROCEDURE — 36415 COLL VENOUS BLD VENIPUNCTURE: CPT | Performed by: INTERNAL MEDICINE

## 2019-10-07 PROCEDURE — 36415 COLL VENOUS BLD VENIPUNCTURE: CPT | Performed by: ORTHOPAEDIC SURGERY

## 2019-10-07 PROCEDURE — 85018 HEMOGLOBIN: CPT | Performed by: ORTHOPAEDIC SURGERY

## 2019-10-07 PROCEDURE — 99232 SBSQ HOSP IP/OBS MODERATE 35: CPT | Performed by: INTERNAL MEDICINE

## 2019-10-07 PROCEDURE — 97116 GAIT TRAINING THERAPY: CPT | Mod: GP | Performed by: PHYSICAL THERAPY ASSISTANT

## 2019-10-07 RX ORDER — AMOXICILLIN 250 MG
1 CAPSULE ORAL 2 TIMES DAILY
Qty: 30 TABLET | Refills: 0 | Status: SHIPPED | OUTPATIENT
Start: 2019-10-07 | End: 2019-11-14

## 2019-10-07 RX ORDER — OXYCODONE AND ACETAMINOPHEN 5; 325 MG/1; MG/1
1-2 TABLET ORAL EVERY 4 HOURS PRN
Qty: 60 TABLET | Refills: 0 | Status: SHIPPED | OUTPATIENT
Start: 2019-10-07 | End: 2019-11-14

## 2019-10-07 RX ADMIN — RANITIDINE 150 MG: 150 TABLET ORAL at 09:12

## 2019-10-07 RX ADMIN — OXYCODONE HYDROCHLORIDE 5 MG: 5 TABLET ORAL at 11:27

## 2019-10-07 RX ADMIN — OXYBUTYNIN CHLORIDE 5 MG: 5 TABLET, EXTENDED RELEASE ORAL at 09:12

## 2019-10-07 RX ADMIN — EZETIMIBE 10 MG: 10 TABLET ORAL at 09:12

## 2019-10-07 RX ADMIN — ACETAMINOPHEN 975 MG: 325 TABLET, FILM COATED ORAL at 01:35

## 2019-10-07 RX ADMIN — LEVOTHYROXINE SODIUM 25 MCG: 25 TABLET ORAL at 06:18

## 2019-10-07 RX ADMIN — ACETAMINOPHEN 650 MG: 325 TABLET, FILM COATED ORAL at 11:27

## 2019-10-07 RX ADMIN — SENNOSIDES AND DOCUSATE SODIUM 1 TABLET: 8.6; 5 TABLET ORAL at 09:12

## 2019-10-07 RX ADMIN — ENOXAPARIN SODIUM 40 MG: 40 INJECTION SUBCUTANEOUS at 09:11

## 2019-10-07 RX ADMIN — CLOPIDOGREL BISULFATE 75 MG: 75 TABLET, FILM COATED ORAL at 09:12

## 2019-10-07 RX ADMIN — OXYCODONE HYDROCHLORIDE 5 MG: 5 TABLET ORAL at 01:35

## 2019-10-07 RX ADMIN — OXYCODONE HYDROCHLORIDE 5 MG: 5 TABLET ORAL at 06:18

## 2019-10-07 RX ADMIN — THERA TABS 1 TABLET: TAB at 09:12

## 2019-10-07 ASSESSMENT — ACTIVITIES OF DAILY LIVING (ADL)
ADLS_ACUITY_SCORE: 14

## 2019-10-07 NOTE — PROGRESS NOTES
"Orthopedic Surgery  10/7/2019    S: Patient voices no complaints today.     O: Blood pressure 116/58, pulse 99, temperature 100  F (37.8  C), temperature source Oral, resp. rate 16, height 1.651 m (5' 5\"), weight 69.9 kg (154 lb), last menstrual period 03/18/2005, SpO2 94 %, not currently breastfeeding.  Lab Results   Component Value Date    HGB 7.0 10/07/2019     Lab Results   Component Value Date    INR 1.01 09/27/2019        Neurovascularly intact.  Calves are negative bilaterally, both soft and nontender.  The wound is C/D/I.  The wound looks good with minimal erythema of the surrounding skin.    A: Ms. Jacobson is doing well status post Procedure(s):  Hardware removal hardware left hip and left total hip arthroplasty.    P: Continue physical therapy.   Anticoagulation discharge home on plavix and ASA  Pain management  Discharge planning, home when safe with therapy    Spencer Celeste MD  [phone]      "

## 2019-10-07 NOTE — PLAN OF CARE
"A&Ox. VSS. A1 with gb and walker. Lymphedema wrap in place to LLE. Dressing mod amount of drainage, per Ortho change before discharge. Pt declined this shift to change \"until she knows shes discharging\". Ice for comfort. Taking PRN oxy 5mg with PRN tylenol which is mostly effective for pain management. Unsure of discharge plans but per ortho ok to discharge today with HGB 7.4. Awaiting hospitalist to confirm discharge ok with HGB 7.4.   "

## 2019-10-07 NOTE — PLAN OF CARE
Patient plan: home today  Current status: PT - Pt performed 4 steps with B rails and platform step with ww with SBA.  Goal met PT - Pt performed 4 steps with B rails and platform step with ww with SBA.  Goal met PT - Pt transfers supine to / from sit with CGA of L LE only.  Pt transfers sit to/from stand & up/off bed with use of stool with supervision.  Pt transfers bed to/from chair with ww indep.  All goals met  Anticipated status at discharge: Collaborated with PT - all goals met - no further PT needs. Pt has her own wheeled walker. Please refer to discharge summary.

## 2019-10-07 NOTE — PROGRESS NOTES
"               [facility]  Hospitalist Progress Note  Name: Angeli Jacobson    MRN: [id number]  YOB: 1950    Age: 69 year old  Date of admission: 10/4/2019  Primary care provider: Sepideh Burris      Reason for Stay (Diagnosis): Left total hip arthroplasty         Assessment and Plan:      Summary of Stay:  69-year-old woman admitted to the hospital for hardware removal and total hip arthroplasty.This is an elective surgery scheduled, she denies any recent medical concern.  Were consulted to see the patient for medical management while she is in the hopital.     1. Left total hip arthroplasty on October 4, 2019  2. Acute blood loss anemia needs further monitoring: Stable no further drop in hemoglobin  3. Femoral-popliteal bypass history  4. History of sarcoma left thigh status post resection  5. Hyperlipidemia  6. Depression       Medically the patient is stable. Continue same medication.    Patient is seen with the orthopedics service for medical management. Post-op surgical wound care, pain control, activity and DVT prophylaxis per orthopedist.     Patient can be discharged home from medical perspective      Interval History (Subjective):      Assumed care reviewed chart.  Initial hemoglobin was down to 7 repeat was 7.4 no signs of obvious bleeding.  No specific complaints.  She feels a bit discouraged and sad because of pain with movement although is able to move feels pain is somewhat better.  Denies any melena.  No orthostasis, lightheadedness.  Denies chest pain.  Review of all the other symptoms are negative.         Physical Exam:      Vital signs:  Temp: 98.2  F (36.8  C) Temp src: Axillary BP: 107/59 Pulse: 99 Heart Rate: 85 Resp: 16 SpO2: 94 % O2 Device: None (Room air) Oxygen Delivery: 2 LPM Height: 165.1 cm (5' 5\") Weight: 69.9 kg (154 lb)  Estimated body mass index is 25.63 kg/m  as calculated from the following:    Height as of this encounter: 1.651 m (5' 5\").    " Weight as of this encounter: 69.9 kg (154 lb).    I/O last 3 completed shifts:  In: 990 [P.O.:990]  Out: -   Vitals:    10/04/19 0541   Weight: 69.9 kg (154 lb)       Constitutional: Awake, alert, cooperative, no apparent distress   Respiratory: Nl work of breathing. Clear to auscultation bilaterally, no crackles or wheezing   Cardiovascular: Regular rate and rhythm, normal S1 and S2, and no murmur noted   Abdomen: Normal bowel sounds, soft, non-distended, non-tender   Skin: No rashes, no cyanosis, dry to touch   Neuro: CN 2-12 intact, no localizing weakness   Extremities: No edema, normal range of motion   HEENT Normocephalic, atraumatic, normal nasal turbinates; oropharynx clear   Neck Supple; nl inspection; trachea midline; no thryomegaly   Psychiatric: A+O x3. Normal affect          Medications:      All current medications were reviewed with changes reflected in problem list.         Data:      All new lab and imaging data was reviewed.   Labs:  Recent Labs   Lab 10/07/19  1039 10/07/19  0645 10/06/19  1634  10/04/19  1522   HGB 7.4* 7.0* 7.9*   < >  --    PLT  --  189  --   --  217    < > = values in this interval not displayed.     Recent Labs   Lab 10/06/19  0605 10/04/19  1522   *  --    CR  --  0.50*   GFRESTIMATED  --  >90   GFRESTBLACK  --  >90     No results for input(s): INR in the last 168 hours.   Imaging:   No results found for this or any previous visit (from the past 24 hour(s)).

## 2019-10-07 NOTE — PLAN OF CARE
Discharge Planner OT   Patient plan for discharge: home  Current status: pt completed bed mobility with min A for L LE. Sit to stand and amb 50' with SBA and FWW. Pt declined tub transfer demo or need for further practice with LE dressing. Pt's  is able to assist with tub transfer and dressing as needed  Barriers to return to prior living situation: none anticipated  Recommendations for discharge: home with assist from  with tub transfer and IADL's until back to baseline  Rationale for recommendations: pt doing well, all OT goals met. Will discharge from OT    Occupational Therapy Discharge Summary    Reason for therapy discharge:    Discharged to home.    Progress towards therapy goal(s). See goals on Care Plan in [facility] electronic health record for goal details.  Goals met    Therapy recommendation(s):    No further therapy is recommended.           Entered by: Katerina Almodovar 10/07/2019 11:20 AM

## 2019-10-07 NOTE — PLAN OF CARE
RN [id number]  A/O, VSS pain controlled with oxycodone and atarax.  Up with assist of 1 and walker voiding.  Plans to discharge home with

## 2019-10-08 ENCOUNTER — MYC MEDICAL ADVICE (OUTPATIENT)
Dept: FAMILY MEDICINE | Facility: CLINIC | Age: 69
End: 2019-10-08

## 2019-10-08 ENCOUNTER — TELEPHONE (OUTPATIENT)
Dept: FAMILY MEDICINE | Facility: CLINIC | Age: 69
End: 2019-10-08

## 2019-10-08 NOTE — TELEPHONE ENCOUNTER
"Hospital/TCU/ED for chronic condition Discharge Protocol    \"Hi, my name is Azul James RN, a registered nurse, and I am calling from [facility].    I am calling to follow up and see how things are going for you after your recent hospital stay.\"    Tell me how you are doing now that you are home?\" tired, post op pain      Discharge Instructions    \"Let's review your discharge instructions.  What is/are the follow-up recommendations?  Pt. Response: walk 20 min a day, see surgeon in 2 wks    \"Has an appointment with your primary care provider been scheduled?\"   No (schedule appointment)    \"When you see the provider, I would recommend that you bring your medications with you.\"    Medications    \"Tell me what changed about your medicines when you discharged?\"    Changes to chronic meds?    0-1    \"What questions do you have about your medications?\"    None     New diagnoses of heart failure, COPD, diabetes, or MI?    No      Post Discharge Medication Reconciliation Status: discharge medications reconciled, continue medications without change.    Was MTM referral placed (*Make sure to put transitions as reason for referral)?   No    Call Summary    \"What questions or concerns do you have about your recent visit and your follow-up care?\"     none   Pt was wondering about taking iron supplement, per [employer] message ok to start iron tabs, warned that could cause constipation as well as pain pills  Encouraged to eat foods high in iron, take stool softener    \"If you have questions or things don't continue to improve, we encourage you contact us through the main clinic number (give number).  Even if the clinic is not open, triage nurses are available 24/7 to help you.     We would like you to know that our clinic has extended hours (provide information).  We also have urgent care (provide details on closest location and hours/contact info)\"      \"Thank you for your time and take care!\"       Azul James RN, " BS  Clinical Nurse Triage.

## 2019-10-10 ENCOUNTER — MYC MEDICAL ADVICE (OUTPATIENT)
Dept: FAMILY MEDICINE | Facility: CLINIC | Age: 69
End: 2019-10-10

## 2019-10-10 ENCOUNTER — HOSPITAL ENCOUNTER (OUTPATIENT)
Dept: ULTRASOUND IMAGING | Facility: CLINIC | Age: 69
Discharge: HOME | End: 2019-10-10
Attending: INTERNAL MEDICINE | Admitting: INTERNAL MEDICINE
Payer: COMMERCIAL

## 2019-10-10 ENCOUNTER — OFFICE VISIT (OUTPATIENT)
Dept: OTHER | Facility: CLINIC | Age: 69
End: 2019-10-10
Attending: INTERNAL MEDICINE
Payer: COMMERCIAL

## 2019-10-10 VITALS
HEART RATE: 92 BPM | SYSTOLIC BLOOD PRESSURE: 114 MMHG | BODY MASS INDEX: 25.66 KG/M2 | RESPIRATION RATE: 16 BRPM | OXYGEN SATURATION: 98 % | WEIGHT: 154 LBS | HEIGHT: 65 IN | DIASTOLIC BLOOD PRESSURE: 54 MMHG

## 2019-10-10 DIAGNOSIS — E03.9 HYPOTHYROIDISM, UNSPECIFIED TYPE: ICD-10-CM

## 2019-10-10 DIAGNOSIS — E78.5 HYPERLIPIDEMIA LDL GOAL <70: ICD-10-CM

## 2019-10-10 DIAGNOSIS — I89.0 LYMPHEDEMA OF LEFT LEG: Primary | ICD-10-CM

## 2019-10-10 DIAGNOSIS — Z87.81 S/P ORIF (OPEN REDUCTION INTERNAL FIXATION) FRACTURE: ICD-10-CM

## 2019-10-10 DIAGNOSIS — Z96.642 STATUS POST TOTAL REPLACEMENT OF LEFT HIP: ICD-10-CM

## 2019-10-10 DIAGNOSIS — I73.9 PAD (PERIPHERAL ARTERY DISEASE) (H): ICD-10-CM

## 2019-10-10 DIAGNOSIS — Z98.890 S/P ORIF (OPEN REDUCTION INTERNAL FIXATION) FRACTURE: ICD-10-CM

## 2019-10-10 DIAGNOSIS — I89.0 LYMPHEDEMA OF LEFT LEG: ICD-10-CM

## 2019-10-10 DIAGNOSIS — C49.9 MYXOID LIPOSARCOMA (H): ICD-10-CM

## 2019-10-10 DIAGNOSIS — Z95.828 HISTORY OF ARTERIAL BYPASS OF LOWER EXTREMITY: ICD-10-CM

## 2019-10-10 PROCEDURE — 99215 OFFICE O/P EST HI 40 MIN: CPT | Mod: ZP | Performed by: INTERNAL MEDICINE

## 2019-10-10 PROCEDURE — 93971 EXTREMITY STUDY: CPT | Mod: LT

## 2019-10-10 PROCEDURE — G0463 HOSPITAL OUTPT CLINIC VISIT: HCPCS | Mod: 25

## 2019-10-10 PROCEDURE — 99354 ZZC PROLONGED SERV,OFFICE,1ST HR: CPT | Mod: ZP | Performed by: INTERNAL MEDICINE

## 2019-10-10 RX ORDER — CYCLOBENZAPRINE HCL 10 MG
10 TABLET ORAL 2 TIMES DAILY PRN
Qty: 30 TABLET | Refills: 1 | Status: SHIPPED | OUTPATIENT
Start: 2019-10-10 | End: 2020-05-20

## 2019-10-10 ASSESSMENT — MIFFLIN-ST. JEOR: SCORE: 1224.42

## 2019-10-10 NOTE — PROGRESS NOTES
"Angeli Jacobson is a 69 year old female who presents for:  Chief Complaint   Patient presents with     RECHECK     New Consult for Left Leg Lymphedema - Pt is currently a patient of Dr Owens's - *[facility] 09/20/19        Vitals:    Vitals:    10/10/19 0849   BP: 114/54   BP Location: Right arm   Patient Position: Chair   Cuff Size: Adult Regular   Pulse: 92   Resp: 16   SpO2: 98%   Weight: 154 lb (69.9 kg)   Height: 5' 5\" (1.651 m)       BMI:  Estimated body mass index is 25.63 kg/m  as calculated from the following:    Height as of this encounter: 5' 5\" (1.651 m).    Weight as of this encounter: 154 lb (69.9 kg).    Pain Score:  Data Unavailable        Dennis Powell, [facility]    "

## 2019-10-10 NOTE — PROGRESS NOTES
SUBJECTIVE:  CC:  Evaluation and management of left lower extremity swelling with known history of lymphedema  History of left lower extremity arterial bypass for PAD  She broke her left hip in April 2019 while in [place] underwent ORIF followed by left total hip replacement on October 4, 2019 at [facility].    HPI:   Angeli Jacobson is a 69 year old very pleasant female with past medical history of left thigh/groin sarcoma underwent surgery with lymphadenectomy and radiation therapy in the year 2000 at [facility] followed by she developed peripheral arterial disease underwent  Redo left common femoral to a bony popliteal artery bypass with 6 mm PTFE graft in January 2019 and since then reasonably doing well as for as the PAD concerned visited [place] in April and she fell down broke her left hip underwent ORIF over there.   She has a long-standing history of lymphedema after sarcoma surgery and radiation therapy.  Using compression stockings etc..  She was also evaluated extensively at lymphedema clinic and currently using pump.  On October 4, 2019 she underwent left total hip replacement and now slightly worsening left leg swelling and here today for further evaluation.  She is also having back spasms, using wheelchair but able to ambulate with walker.  She denies chest pain, shortness of breath.  She has been taking both Plavix and aspirin.  She accompanied by her  for this visit today  Few months ago left lower extremity arterial duplex and ADRIAN Dopplers were goo  HISTORIES:  PROBLEM LIST:   Patient Active Problem List   Diagnosis     Pain in joint, multiple sites     MULTINODUL GOITER     Hearing loss     Hyperlipidemia LDL goal <70     Osteoporosis     Cervicalgia     Major depressive disorder, recurrent episode, moderate (H)     Impaired fasting glucose     Lymphedema of left leg     Tubular adenoma     Other constipation     Vertigo     Myxoid liposarcoma (HCC)      PAD (peripheral artery disease) (H)     Vascular graft occlusion (H)     Femoral-popliteal bypass graft occlusion, left (H)     History of sarcoma     S/P ORIF (open reduction internal fixation) fracture     Hip pain, left     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)     Urge incontinence of urine     Postural urinary incontinence     Personal history of urinary tract infection     OAB (overactive bladder)     Myalgia of pelvic floor     Pelvic floor dysfunction     Lesion of bladder     S/P total hip arthroplasty     PAST MEDICAL HISTORY:  Past Medical History:   Diagnosis Date     * * * SBE PROPHYLAXIS * * * 1998    Amox 500mg, take 4 tabs one hour prior to procedure.Takes this because of lymphedema secondary from leg surgey     Central serous retinopathy 2001    Resolved 9/2001     CHRONIC NECK PAIN 1995     Depressive disorder      Depressive disorder, not elsewhere classified 2001     History of blood transfusion 04/2019    during left hip pinning     MIXED HYPERLIPIDEMIA, LDL GOAL <160 1998    LDL goal < 160     Motion sickness      MYXOID LIPOSARCOMA 2000    Left thigh, S/P excision, radiation  at [facility]     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    CHRONIC LEFT THIGH LYMPHEDEMA     Nontoxic multinodular goiter 2005    needs yearly US     Osteoporosis, unspecified 2001     Other lymphedema 2000    left thigh, gets regular PT for this     PAD (peripheral artery disease) (H) 4/20/2018     PONV (postoperative nausea and vomiting)      SHINGLES 2001     Sprain of lumbosacral (joint) (ligament) 1995    right     Unspecified hearing loss 1998    chronic tinnitus     Unspecified tinnitus 1998     PAST SURGICAL HISTORY:  Past Surgical History:   Procedure Laterality Date     BYPASS GRAFT FEMOROPOPLITEAL Left 1/21/2019    Procedure: LEFT FEMORAL TO ABOVE KNEE POPLITEAL  BYPASS WITH POLYTETRAFLUOROETHYLENE GRAFT;  Surgeon: Shade Owens MD;  Location: [facility] OR     C APPENDECTOMY      Appendectomy     C  NONSPECIFIC PROCEDURE  04/2000    Open Biopsy Left Thigh Liposarcoma     COLONOSCOPY N/A 2/5/2016    Procedure: COMBINED COLONOSCOPY, SINGLE OR MULTIPLE BIOPSY/POLYPECTOMY BY BIOPSY;  Surgeon: Varun Stanley MD, MD;  Location:  GI     CYSTOSCOPY       EXCISION MALIG LESION>1.25CM  5/2000    Myxoid Liposarcoma       EXPLORE GROIN Right 5/1/2018    Procedure: EXPLORE GROIN;  EMERGENCY RIGHT FEMORAL EXPLORATION WITH FEMORAL ARTERY REPAIR.    EBL: 50mL;  Surgeon: Shade Owens MD;  Location: [facility] OR     HC COLONOSCOPY THRU STOMA, DIAGNOSTIC  2006    due 2010     HC COLP CERVIX/UPPER VAGINA  07/1997    Negative     HC DILATION/CURETTAGE DIAG/THER NON OB  02/1997    Post menopausal bleeding on HRT, negative     HIP SURGERY Left 04/13/2019     IR ANGIOGRAM THROUGH CATHETER FOLLOW UP  12/20/2018     IR ANGIOGRAM THROUGH CATHETER FOLLOW UP  12/21/2018     IR LOWER EXTREMITY ANGIOGRAM LEFT  12/19/2018     VASCULAR SURGERY  04/30/2018    Left SFA stent in bypass graft     CURRENT MEDICATIONS:  Current Outpatient Medications   Medication Sig Dispense Refill     Acetaminophen 325 MG CAPS Take 500 mg by mouth 2 times daily as needed        alendronate (FOSAMAX) 70 MG tablet Take 1 tablet (70 mg) by mouth with 8oz water every 7 days 30 minutes before breakfast and remain upright during this time. 12 tablet 3     aspirin (ASA) 81 MG chewable tablet Take 81 mg by mouth daily       calcium carbonate 600 mg-vitamin D 400 units (CALTRATE) 600-400 MG-UNIT per tablet Take 1 chew tab by mouth daily       clopidogrel (PLAVIX) 75 MG tablet Take 1 tablet (75 mg) by mouth daily 90 tablet 3     ezetimibe (ZETIA) 10 MG tablet Take 1 tablet (10 mg) by mouth daily 90 tablet 3     levothyroxine (SYNTHROID/LEVOTHROID) 25 MCG tablet Take 1 tablet (25 mcg) by mouth daily 90 tablet 0     multivitamin, therapeutic (THERA-VIT) TABS tablet Take 1 tablet by mouth daily       nitroFURantoin macrocrystal-monohydrate (MACROBID) 100 MG capsule Take 1  tablet after intercourse 30 capsule 3     order for DME Equipment being ordered: Walker Wheels () and Walker ()  Treatment Diagnosis: difficulty walking 1 each 0     order for DME Equipment being ordered: Left Thigh High Compression Stocking, 550 CCL.3 1 each 99     ORDER FOR DME Equipment being ordered: lymphedema bandages 2 Device 1     oxybutynin ER (DITROPAN-XL) 5 MG 24 hr tablet Take 1 tablet (5 mg) by mouth daily 90 tablet 1     oxyCODONE-acetaminophen (PERCOCET) 5-325 MG tablet Take 1-2 tablets by mouth every 4 hours as needed for severe pain 60 tablet 0     rosuvastatin (CRESTOR) 40 MG tablet Take 1 tablet (40 mg) by mouth every evening 90 tablet 3     senna-docusate (SENOKOT-S/PERICOLACE) 8.6-50 MG tablet Take 1 tablet by mouth 2 times daily 30 tablet 0     azelastine (ASTELIN) 0.1 % nasal spray Spray 1 spray in nostril daily as needed   10     ALLERGIES:  Allergies   Allergen Reactions     Celexa [Citalopram Hydrobromide]      Decreased libido     SOCIAL HISTORY:  Social History     Socioeconomic History     Marital status:      Spouse name: Chris     Number of children: 3     Years of education: 14     Highest education level: Associate degree: academic program   Occupational History     Occupation: at home     Employer: NONE    Social Needs     Financial resource strain: Not hard at all     Food insecurity:     Worry: Never true     Inability: Never true     Transportation needs:     Medical: No     Non-medical: No   Tobacco Use     Smoking status: Never Smoker     Smokeless tobacco: Never Used   Substance and Sexual Activity     Alcohol use: No     Frequency: Monthly or less     Drinks per session: 1 or 2     Binge frequency: Never     Drug use: No     Sexual activity: Yes     Partners: Male     Birth control/protection: Post-menopausal   Lifestyle     Physical activity:     Days per week: 0 days     Minutes per session: 0 min     Stress: Not at all   Relationships     Social  connections:     Talks on phone: More than three times a week     Gets together: Once a week     Attends Faith service: More than 4 times per year     Active member of club or organization: No     Attends meetings of clubs or organizations: Never     Relationship status:      Intimate partner violence:     Fear of current or ex partner: Not on file     Emotionally abused: Not on file     Physically abused: Not on file     Forced sexual activity: Not on file   Other Topics Concern     Parent/sibling w/ CABG, MI or angioplasty before 65F 55M? No   Social History Narrative     Not on file     FAMILY HISTORY:  Family History   Problem Relation Age of Onset     C.A.D. Father         MI 57     Alcohol/Drug Father         etoh     Obesity Mother      Osteoporosis Mother      Colon Cancer Brother 70     Hyperlipidemia Son      Hyperlipidemia Son         very high, experimental drug     C.A.KARYN. Paternal Grandmother         ascvd     Diabetes Maternal Grandmother      Cancer Maternal Grandmother      C.A.KARYN. Paternal Uncle         Mi  age 48     Cancer Maternal Aunt         pancreatic CA     REVIEW OF SYSTEMS:  CONSTITUTIONAL:no malaise, fatigue, or other general symptoms  EYES: no subjective changes in visual acuity, no photophobia  ENT/MOUTH: no complaints of rhinorrhea, nasal congestion, sore throat, hearing changes  RESP:no SOB  CV: no c/o exertional chest pressure or SALDANA  GI: No abdominal pain, constipation, change in bowel movements, nausea, pyrosis, BRBPR  :no polyuria or polydipsia, no dysuria, no gross hematuria  MUSCULOSKELATAL: Recent history of left hip fracture underwent ORIF then followed by 2019 left total hip arthroplasty now with worsening leg swelling and also history of underlying lymphedema.  INTEGUMENTARY/SKIN: no pruritis, rashes, or moles with recent change in size, shape, or pigmentation  NEURO: no gross sensory or motor symptoms, no dizziness, no confusion  ENDOCRINE: no  "polyuria or polydipsia, no heat or cold intolerance  HEME/ALLERGY/IMMUNE: no fevers, chills, night sweats, or unwanted weight loss  PSYCHIATRIC: no depression, anxiety, or internal stimuli  EXAM:  /54 (BP Location: Right arm, Patient Position: Chair, Cuff Size: Adult Regular)   Pulse 92   Resp 16   Ht 5' 5\" (1.651 m)   Wt 154 lb (69.9 kg)   LMP 03/18/2005   SpO2 98%   BMI 25.63 kg/m    BMI: Body mass index is 25.63 kg/m .  GENERAL APPEARANCE:  Pleasant  Healthy appearing male , alert, active, no distress cooperative.  EXAM:  EYES: clear conjunctiva, no cataracts, no obvious fundoscopic abnormalities  HENT: oropharynx, nares, and TMs are WNL  NECK: no JVD, thyromegaly or lymphadenopathy, no cervical bruits  RESP: clear to auscultation without rales, wheezes, or rhonchi  CV: RRR, no murmurs, gallops, or rubs  LYMPH: no cervical , axillary, or inguinal lymphadenopathy appreciated  GI: NABS, ND/NT, no masses or organomegally appreciated  : normal external genitalia and anus, no lumps, masses  MS: vascular: Left lower extremity much larger than right side with classical features of lymphedema  Dressing in place on the left lateral upper part of the hip area from her recent surgery and this is blood-tinged.  Due to swelling difficulty to palpate the pulses but she has excellent Bi-triphasic dopplerable pulses in both legs.  Both feet are well perfused no foot ulcers  SKIN: no nevi clinically suspicious for malignancy are noted  NEURO: CN II-XII intact, no localizing sensory or motor abnoramlities noted, DTRs symmetrical bilaterally  PSYCH: Mental status exam reveals the pt to have normal mood and affect. There is no disorder of thought form or content. There is no response to internal stimuli. There is no suicidal or homicidal ideation.      ULTRASOUND LOWER EXTREMITY VENOUS DUPLEX LEFT  10/10/2019 11:02 AM     CLINICAL HISTORY/INDICATION: Lymphedema of left leg. Status post total  replacement of left hip. " Status post ORIF (open reduction internal  fixation) fracture.     COMPARISON: None relevant     TECHNIQUE: Grayscale, color-flow, and spectral waveform analysis were  performed of the deep veins of the left lower extremity     FINDINGS: The left common femoral vein, femoral vein, popliteal vein  and posterior tibial veins demonstrate normal compressibility,  spectral waveform, color flow and augmentation. The peripheral femoral  vein was not visualized.     The contralateral right common femoral vein demonstrates normal  compressibility, spectral waveform, color flow and augmentation.                                                                      IMPRESSION: No deep vein thrombosis in the left lower extremity.     GEORGE HOLLEY,     A/P:  (I89.0) Lymphedema of left leg  (primary encounter diagnosis)  (Z96.642) Status post total replacement of left hip 10/4/2019  (Z98.890,  Z87.81) S/P ORIF (open reduction internal fixation) fracture 4/2019 in [place]   (C49.9) Myxoid liposarcoma (H) of left thigh s/p surgery annd XRT at [facility] in 2000.  (Z95.828) History of arterial bypass of Left lower extremity  (E78.5) Hyperlipidemia LDL goal <70  Hypothyroidism on replacement with still elevated TSH:    This is a very pleasant female well-known to me from her previous admissions to the [facility] with extensive past medical and past surgical history as delineated able underwent October 4th 2019 left total hip arthroplasty and prior to that in April 2019 she underwent open reduction internal fixation of left hip in [place] due to fall related fracture now with worsening left leg swelling and known history of a left lower extremity lymphedema secondary to liposarcoma surgery with radiation therapy in 2000.  She was extensively evaluated in the past at lymphedema therapy underwent MLD then followed by compression stockings and currently using the pump with minimal response.  After these 2  surgeries leg swelling got worse.  She denies any chest pain, shortness of breath or palpitations  She is also having some back spasms while lying down in the bed  Reviewed recent imaging studies, op report and laboratory data in the epic  Given worsening leg swelling recent surgery have obtained left lower extremity venous duplex at [facility] which is negative for DVT.    Plan:  Continue to utilize compression stocking and elevate the leg with blue wedge leg elevator as tolerated  Continue to utilize lymphedema pump starting below the surgical site  For spasms Flexeril 10 mg twice daily as needed basis  Follow-up with orthopedic surgery  Increase levothyroxine dose to 50 mcg daily  Continue rest of the medications same  Arrange for left lower extremity arterial duplex and ADRIAN before next visit approximately in a month.    Total patient care time spent today more than 70 minutes face-to-face, more than 50% of the time spent counseling of the above-mentioned multiple issues.  Prolonged service is due to initial evaluation then followed by reevaluation after imaging study, review of extensive records in the epic and updated chart.  Patient and her  had a lot of questions and all of them were answered.    Written instructions given,     Return to clinic in a month same day after imaging studies    This note was dictated by utilizing dragon software    I have discussed with patient the risks, benefits, medications, treatment options and modalities.   I have instructed the patient to call or schedule a follow-up appointment if any problems or failure to improve.    Copy of this dictation to primary care provider and Dr. Roman Prince MD,[facility]  Vascular Medicine

## 2019-10-10 NOTE — PATIENT INSTRUCTIONS
Your leg US no blood clot ( it is limited due to swelling)    Take levothyroxin 50 mcg ( double current dose, take 2 pills) in early am on empty stomach  with water and do not mix other meds    Take cyclobenzaprine muscle relaxant as needed twice a day     Elevate leg    follow up with Ortho     Continue rest of the medications same.    See me in a month or so

## 2019-10-10 NOTE — TELEPHONE ENCOUNTER
Sepideh- see mychart message below.  I am unable to find a muscle relaxer that [facility] gave her.  Reyna Luciano RN

## 2019-10-14 ENCOUNTER — TRANSFERRED RECORDS (OUTPATIENT)
Dept: HEALTH INFORMATION MANAGEMENT | Facility: CLINIC | Age: 69
End: 2019-10-14

## 2019-10-17 ENCOUNTER — TRANSFERRED RECORDS (OUTPATIENT)
Dept: HEALTH INFORMATION MANAGEMENT | Facility: CLINIC | Age: 69
End: 2019-10-17

## 2019-10-17 NOTE — DISCHARGE SUMMARY
"Discharge Summary    Angeli Jacobson MRN# [id number]   YOB: 1950 Age: 69 year old     Date of Admission: 10/4/2019    Date of Discharge: 10/7/2019    Reason for Admission: Angeli Jacobson is an 69 year old female who was admitted to the hospital following surgery with Dr. Spencer Celeste.    Preoperative Diagnosis: Post-traumatic osteoarthritis of left hip [M16.52]  Presence of retained hardware [Z96.9]    Postoperative Diagnosis: Post-traumatic osteoarthritis of left hip [M16.52]  Presence of retained hardware [Z96.9]    Procedure Completed: removal of hip hardware, and left total hip arthroplasty     Hospital Course:  Ms. Jacobson was admitted and underwent the above procedure. The patient tolerated the procedure well. There were no complications. Following surgery she was admitted to the floor.  During her stay she did not require any blood transfusions.  Her last hemoglobin was noted to be   Lab Results   Component Value Date    HGB 7.4 10/07/2019   .  Her pain was controlled with oral pain medication.  During her stay she progressed well in physical therapy and all the therapy goals were met.     Discharge Physical Exam:  /59 (BP Location: Left arm)   Pulse 99   Temp 98.2  F (36.8  C) (Axillary)   Resp 16   Ht 1.651 m (5' 5\")   Wt 69.9 kg (154 lb)   LMP 03/18/2005   SpO2 94%   BMI 25.63 kg/m    Neurovascularly intact, distal pulses present bilaterally.  Calves are negative bilaterally, both soft and nontender.  The wound looks good with minimal erythema of the surrounding skin.    Assessment: Ms. Jacobson is stable and doing well status post left total hip arthroplasty on POD #3.    Plan: We will discharge her home on analgesics and deep venous thrombosis prophylaxis.  She will follow-up with Christine Alex PA-C or Dr. Spencer Celeste approximately 2 weeks from surgery.  She may call [phone] to schedule an appointment.    Discharge Instructions:  Discharge diet: Regular "   Discharge activity: Weight bear as tolerated.  Advance from the walker to a cane as tolerated.  Discontinue the use of cane when comfortable.   Physical therapy: Work on therapy exercises given in the hospital.     Discharge follow-up: Follow up with Christine Chaves PA-C in 10-14 days.   Anticoagulation Asprin 325 mg twice daily for 5 weeks.   Wound care: Leave aquacel dressing in place until post-op follow-up.  If it becomes loose or soiled then remove and replace with daily dry dressings.  Keep wound clean and dry.  May get incision area wet in shower but do not soak or scrub.         Meds:   Angeli Jacobson   Home Medication Instructions DAMIEN:[id number]    Printed on:10/17/19 9432   Medication Information                      Acetaminophen 325 MG CAPS  Take 500 mg by mouth 2 times daily as needed              alendronate (FOSAMAX) 70 MG tablet  Take 1 tablet (70 mg) by mouth with 8oz water every 7 days 30 minutes before breakfast and remain upright during this time.             aspirin (ASA) 81 MG chewable tablet  Take 81 mg by mouth daily             azelastine (ASTELIN) 0.1 % nasal spray  Spray 1 spray in nostril daily as needed              calcium carbonate 600 mg-vitamin D 400 units (CALTRATE) 600-400 MG-UNIT per tablet  Take 1 chew tab by mouth daily             clopidogrel (PLAVIX) 75 MG tablet  Take 1 tablet (75 mg) by mouth daily             ezetimibe (ZETIA) 10 MG tablet  Take 1 tablet (10 mg) by mouth daily             levothyroxine (SYNTHROID/LEVOTHROID) 25 MCG tablet  Take 1 tablet (25 mcg) by mouth daily             multivitamin, therapeutic (THERA-VIT) TABS tablet  Take 1 tablet by mouth daily             nitroFURantoin macrocrystal-monohydrate (MACROBID) 100 MG capsule  Take 1 tablet after intercourse             ORDER FOR DME  Equipment being ordered: lymphedema bandages             order for DME  Equipment being ordered: Left Thigh High Compression Stocking, 550 CCL.3             order  for DME  Equipment being ordered: Walker Wheels () and Walker ()  Treatment Diagnosis: difficulty walking             oxybutynin ER (DITROPAN-XL) 5 MG 24 hr tablet  Take 1 tablet (5 mg) by mouth daily             oxyCODONE-acetaminophen (PERCOCET) 5-325 MG tablet  Take 1-2 tablets by mouth every 4 hours as needed for severe pain             rosuvastatin (CRESTOR) 40 MG tablet  Take 1 tablet (40 mg) by mouth every evening             senna-docusate (SENOKOT-S/PERICOLACE) 8.6-50 MG tablet  Take 1 tablet by mouth 2 times daily                     Christine Alex PA-C  O: [phone]

## 2019-10-21 NOTE — OP NOTE
Procedure Date: 10/04/2019      PREOPERATIVE DIAGNOSIS:  Failed internal fixation of left intertrochanteric and subtrochanteric femur fracture.      POSTOPERATIVE DIAGNOSIS:  Failed internal fixation of left intertrochanteric and subtrochanteric femur fracture.      PROCEDURE:  Removal of left femoral hardware with a conversion to left total hip arthroplasty using a Biomet Annalisa femoral stem with an OsseoTi acetabular shell and a dual mobility bearing surface.      SURGEON:  Spencer Celeste MD      FIRST ASSISTANT:  Christine Alex PA-C      INDICATIONS FOR SURGERY:  Ms. Jacobson is a very pleasant 69-year-old female who had a left intertrochanteric subtrochanteric femur fracture fixed in [place] about 6 months ago after a fall.  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  We had a long discussion of treatment options.  She had increasing pain and clearly the hardware is failing and elected to proceed with total hip arthroplasty.  We discussed removing the femoral hardware; the distal interlocking screw which was broken.  We discussed options for leaving it or removing it piecemeal as necessary.  We removed the femoral stem, as well as the interfragmentary lag screw and then for the implant, we  would use a long femoral stem.  On the socket side, we would use an ingrowth porous metal implant and then using a dual mobility bearing surface for stability and fixing the trochanteric fragment with some sort of claw plate.  The patient and her  understand this and are happy with this plan of care and wished to proceed with surgery.      NARRATIVE EVENTS:  After thorough evaluation and proper identification of the patient to be operated on, Ms. Jacobson was taken to the operating room where she  underwent general anesthetic.  She was placed in the right lateral decubitus position with the left hip up and left hip was prepped and draped in the usual sterile manner.  After appropriate surgical pause to confirm the patient's extremity to be operated on, the patient received 2 grams of Ancef.  Her left hip was approached through a lateral incision centered over the greater trochanter connecting the 2 proximal incisions from her initial internal fixation.  Skin and soft tissue were sharply dissected down to the tensor fascia.  Fascia was split in line with the fibers in line with the femur, taking it down to the trochanteric bursa and the anterior one-third gluteus medius was removed off the greater trochanter in modified Hardinge fashion.  This took us down to the joint capsule, which was T'd and the femoral head and neck were surgically dislocated from the acetabulum.  At this point, we then exposed the proximal femur.  We were able to expose the interfragmentary lag screw.  It was evident that the subtrochanteric fracture had not healed and was mobile.  Once we removed the interfragmentary lag screw which we did without much difficulty, it was evident that the intertrochanteric fragment part of the fracture  also had a nonunion.  We then made our femoral neck osteotomy and removed the femoral head from the field and then exposed the acetabulum, removed ligament of teres, transverse acetabular ligament and labrum and then sequentially reamed the acetabulum up to fit a size 54 Biomet OsseoTi multihole revision acetabular component which was impacted into position.  We had good primary stability in 20 degrees of anteversion and 40 degrees of abduction, but we augmented this with 4 screws, 1 in the inner table of the pelvis, 2 in the posterior column and 1 in the ischium.  At this point, we then placed the liner for a 44 mm dual mobility component and paid our attention to the femur.  Here,  we removed what was  left of the bone from the piriformis fossa, mainly what was left of the femoral head fragment and then reamed this using the conical Annalisa reamers up to fit a size 16 x 150 stem.  We then impacted this into position.  Once we had this solidly into position, we then overreamed this with the proximal body reamers up to size A x 50 proximal body fragment with a -3 neck length on a 28 mm femoral head with our 44 dual mobility trial component.  We reduced the hip.  It reduced nicely and was slightly stiff, so the decision was made at that point to place our real implants into position, a size 50 body segment, which we impacted nicely into position with a standard neck offset.  Once this was done, we retrialed our femoral heads and found that a -3 neck length on a 28 mm femoral head gave us good stability and full range of motion with the 44 dual mobility component.  This was then impacted on the trunnion and the hip was reduced.  We then reduced our greater trochanteric fragment which was still slightly mobile using a [employer]hew 125 mm trochanteric claw.  We cabled this down with 3 cables, which affixed this nicely into position and held it nicely stable.  At this point, we then thoroughly irrigated the wound.  We closed in layers with absorbable sutures.  We closed the joint capsule with interrupted 0 Vicryl sutures.  We closed the abductors with #5 Ethibond sutures through bone tunnels.  We closed the tensor fascia with interrupted and running 0 Vicryl sutures and closed skin and soft tissue with absorbable sutures and staples in the skin.  Of note, when we did remove the femoral shell, we did not need to remove the distal interlocking screw which had broken.  Fluoroscopic images showed that the shell was easily able to be removed from the femur without opening or removing any of the distal locking screw, which had already broken and given the patient's lymphedema, we felt it was unnecessary to remove the screw and  it was left intact.  The patient tolerated the procedure without difficulty.  She was taken to recovery room in stable condition.      Revised surgeon name 10/21/2019  cassi KINSEY MD             D: 10/04/2019   T: 10/04/2019   MT: SAMANTHA      Name:     DIANA ELAINE   MRN:      [id number]        Account:        [id number]   :      1950           Procedure Date: 10/04/2019      Document: [id number]

## 2019-10-23 ENCOUNTER — TRANSFERRED RECORDS (OUTPATIENT)
Dept: HEALTH INFORMATION MANAGEMENT | Facility: CLINIC | Age: 69
End: 2019-10-23

## 2019-10-29 ENCOUNTER — THERAPY VISIT (OUTPATIENT)
Dept: PHYSICAL THERAPY | Facility: CLINIC | Age: 69
End: 2019-10-29
Payer: COMMERCIAL

## 2019-10-29 DIAGNOSIS — M25.562 ACUTE PAIN OF LEFT KNEE: ICD-10-CM

## 2019-10-29 PROCEDURE — 97161 PT EVAL LOW COMPLEX 20 MIN: CPT | Mod: GP | Performed by: PHYSICAL THERAPIST

## 2019-10-29 PROCEDURE — 97110 THERAPEUTIC EXERCISES: CPT | Mod: GP | Performed by: PHYSICAL THERAPIST

## 2019-10-29 ASSESSMENT — ACTIVITIES OF DAILY LIVING (ADL)
GO UP STAIRS: ACTIVITY IS FAIRLY DIFFICULT
LIMPING: THE SYMPTOM AFFECTS MY ACTIVITY MODERATELY
SWELLING: THE SYMPTOM AFFECTS MY ACTIVITY SEVERELY
PAIN: THE SYMPTOM AFFECTS MY ACTIVITY MODERATELY
GO DOWN STAIRS: ACTIVITY IS SOMEWHAT DIFFICULT
SIT WITH YOUR KNEE BENT: I AM UNABLE TO DO THE ACTIVITY
RAW_SCORE: 28
KNEE_ACTIVITY_OF_DAILY_LIVING_SCORE: 40
SQUAT: I AM UNABLE TO DO THE ACTIVITY
GIVING WAY, BUCKLING OR SHIFTING OF KNEE: I DO NOT HAVE THE SYMPTOM
RISE FROM A CHAIR: ACTIVITY IS FAIRLY DIFFICULT
WALK: ACTIVITY IS SOMEWHAT DIFFICULT
HOW_WOULD_YOU_RATE_THE_OVERALL_FUNCTION_OF_YOUR_KNEE_DURING_YOUR_USUAL_DAILY_ACTIVITIES?: ABNORMAL
STIFFNESS: THE SYMPTOM AFFECTS MY ACTIVITY MODERATELY
HOW_WOULD_YOU_RATE_THE_CURRENT_FUNCTION_OF_YOUR_KNEE_DURING_YOUR_USUAL_DAILY_ACTIVITIES_ON_A_SCALE_FROM_0_TO_100_WITH_100_BEING_YOUR_LEVEL_OF_KNEE_FUNCTION_PRIOR_TO_YOUR_INJURY_AND_0_BEING_THE_INABILITY_TO_PERFORM_ANY_OF_YOUR_USUAL_DAILY_ACTIVITIES?: 50
KNEE_ACTIVITY_OF_DAILY_LIVING_SUM: 28
KNEEL ON THE FRONT OF YOUR KNEE: I AM UNABLE TO DO THE ACTIVITY
AS_A_RESULT_OF_YOUR_KNEE_INJURY,_HOW_WOULD_YOU_RATE_YOUR_CURRENT_LEVEL_OF_DAILY_ACTIVITY?: ABNORMAL
WEAKNESS: THE SYMPTOM AFFECTS MY ACTIVITY SLIGHTLY
STAND: ACTIVITY IS SOMEWHAT DIFFICULT
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
## 2019-11-06 ENCOUNTER — THERAPY VISIT (OUTPATIENT)
Dept: PHYSICAL THERAPY | Facility: CLINIC | Age: 69
End: 2019-11-06
Payer: COMMERCIAL

## 2019-11-06 DIAGNOSIS — E03.9 HYPOTHYROIDISM, UNSPECIFIED TYPE: ICD-10-CM

## 2019-11-06 DIAGNOSIS — I73.9 PAD (PERIPHERAL ARTERY DISEASE) (H): ICD-10-CM

## 2019-11-06 DIAGNOSIS — E78.5 HYPERLIPIDEMIA LDL GOAL <70: ICD-10-CM

## 2019-11-06 DIAGNOSIS — M25.562 ACUTE PAIN OF LEFT KNEE: ICD-10-CM

## 2019-11-06 LAB
ANION GAP SERPL CALCULATED.3IONS-SCNC: 5 MMOL/L (ref 3–14)
BASOPHILS # BLD AUTO: 0 10E9/L (ref 0–0.2)
BASOPHILS NFR BLD AUTO: 0.6 %
BUN SERPL-MCNC: 14 MG/DL (ref 7–30)
CALCIUM SERPL-MCNC: 9 MG/DL (ref 8.5–10.1)
CHLORIDE SERPL-SCNC: 108 MMOL/L (ref 94–109)
CO2 SERPL-SCNC: 27 MMOL/L (ref 20–32)
CREAT SERPL-MCNC: 0.62 MG/DL (ref 0.52–1.04)
DIFFERENTIAL METHOD BLD: ABNORMAL
EOSINOPHIL # BLD AUTO: 0.2 10E9/L (ref 0–0.7)
EOSINOPHIL NFR BLD AUTO: 2.5 %
ERYTHROCYTE [DISTWIDTH] IN BLOOD BY AUTOMATED COUNT: 14.4 % (ref 10–15)
GFR SERPL CREATININE-BSD FRML MDRD: >90 ML/MIN/{1.73_M2}
GLUCOSE SERPL-MCNC: 90 MG/DL (ref 70–99)
HCT VFR BLD AUTO: 38 % (ref 35–47)
HGB BLD-MCNC: 11.5 G/DL (ref 11.7–15.7)
LYMPHOCYTES # BLD AUTO: 1.8 10E9/L (ref 0.8–5.3)
LYMPHOCYTES NFR BLD AUTO: 28.5 %
MCH RBC QN AUTO: 28.3 PG (ref 26.5–33)
MCHC RBC AUTO-ENTMCNC: 30.3 G/DL (ref 31.5–36.5)
MCV RBC AUTO: 93 FL (ref 78–100)
MONOCYTES # BLD AUTO: 0.5 10E9/L (ref 0–1.3)
MONOCYTES NFR BLD AUTO: 7.8 %
NEUTROPHILS # BLD AUTO: 3.8 10E9/L (ref 1.6–8.3)
NEUTROPHILS NFR BLD AUTO: 60.6 %
PLATELET # BLD AUTO: 339 10E9/L (ref 150–450)
POTASSIUM SERPL-SCNC: 4.3 MMOL/L (ref 3.4–5.3)
RBC # BLD AUTO: 4.07 10E12/L (ref 3.8–5.2)
SODIUM SERPL-SCNC: 140 MMOL/L (ref 133–144)
TSH SERPL DL<=0.005 MIU/L-ACNC: 3.05 MU/L (ref 0.4–4)
TSH SERPL DL<=0.005 MIU/L-ACNC: 3.05 MU/L (ref 0.4–4)
WBC # BLD AUTO: 6.3 10E9/L (ref 4–11)

## 2019-11-06 PROCEDURE — 84443 ASSAY THYROID STIM HORMONE: CPT | Performed by: INTERNAL MEDICINE

## 2019-11-06 PROCEDURE — 85025 COMPLETE CBC W/AUTO DIFF WBC: CPT | Performed by: INTERNAL MEDICINE

## 2019-11-06 PROCEDURE — 97110 THERAPEUTIC EXERCISES: CPT | Mod: GP | Performed by: PHYSICAL THERAPIST

## 2019-11-06 PROCEDURE — 80048 BASIC METABOLIC PNL TOTAL CA: CPT | Performed by: INTERNAL MEDICINE

## 2019-11-06 PROCEDURE — 36415 COLL VENOUS BLD VENIPUNCTURE: CPT | Performed by: INTERNAL MEDICINE

## 2019-11-07 NOTE — RESULT ENCOUNTER NOTE
To be discussed at upcoming office visit scheduled on 11/13/19  at 2:00.    Barbara Greenwood RN BSN  [facility]

## 2019-11-12 ENCOUNTER — MYC MEDICAL ADVICE (OUTPATIENT)
Dept: FAMILY MEDICINE | Facility: CLINIC | Age: 69
End: 2019-11-12

## 2019-11-12 DIAGNOSIS — E04.2 NONTOXIC MULTINODULAR GOITER: ICD-10-CM

## 2019-11-12 RX ORDER — LEVOTHYROXINE SODIUM 50 UG/1
50 TABLET ORAL DAILY
COMMUNITY
Start: 2019-11-12 | End: 2019-11-13

## 2019-11-13 ENCOUNTER — HOSPITAL ENCOUNTER (OUTPATIENT)
Dept: ULTRASOUND IMAGING | Facility: CLINIC | Age: 69
Discharge: HOME | End: 2019-11-13
Attending: INTERNAL MEDICINE | Admitting: INTERNAL MEDICINE
Payer: COMMERCIAL

## 2019-11-13 ENCOUNTER — HOSPITAL ENCOUNTER (OUTPATIENT)
Dept: ULTRASOUND IMAGING | Facility: CLINIC | Age: 69
End: 2019-11-13
Attending: INTERNAL MEDICINE
Payer: COMMERCIAL

## 2019-11-13 ENCOUNTER — OFFICE VISIT (OUTPATIENT)
Dept: OTHER | Facility: CLINIC | Age: 69
End: 2019-11-13
Attending: INTERNAL MEDICINE
Payer: COMMERCIAL

## 2019-11-13 VITALS
HEART RATE: 78 BPM | SYSTOLIC BLOOD PRESSURE: 122 MMHG | BODY MASS INDEX: 24.83 KG/M2 | HEIGHT: 65 IN | DIASTOLIC BLOOD PRESSURE: 74 MMHG | OXYGEN SATURATION: 98 % | RESPIRATION RATE: 16 BRPM | WEIGHT: 149 LBS

## 2019-11-13 DIAGNOSIS — I73.9 PAD (PERIPHERAL ARTERY DISEASE) (H): ICD-10-CM

## 2019-11-13 DIAGNOSIS — E78.5 HYPERLIPIDEMIA LDL GOAL <70: ICD-10-CM

## 2019-11-13 DIAGNOSIS — Z95.828 HISTORY OF ARTERIAL BYPASS OF LOWER EXTREMITY: ICD-10-CM

## 2019-11-13 DIAGNOSIS — I89.0 LYMPHEDEMA OF LEFT LEG: ICD-10-CM

## 2019-11-13 DIAGNOSIS — Z96.642 STATUS POST TOTAL REPLACEMENT OF LEFT HIP: ICD-10-CM

## 2019-11-13 DIAGNOSIS — Z87.81 S/P ORIF (OPEN REDUCTION INTERNAL FIXATION) FRACTURE: ICD-10-CM

## 2019-11-13 DIAGNOSIS — Z98.890 S/P ORIF (OPEN REDUCTION INTERNAL FIXATION) FRACTURE: ICD-10-CM

## 2019-11-13 DIAGNOSIS — C49.9 MYXOID LIPOSARCOMA (H): ICD-10-CM

## 2019-11-13 DIAGNOSIS — E03.9 HYPOTHYROIDISM, UNSPECIFIED TYPE: ICD-10-CM

## 2019-11-13 DIAGNOSIS — I89.0 LYMPHEDEMA OF LEFT LEG: Primary | ICD-10-CM

## 2019-11-13 PROCEDURE — G0463 HOSPITAL OUTPT CLINIC VISIT: HCPCS | Mod: 25

## 2019-11-13 PROCEDURE — 99215 OFFICE O/P EST HI 40 MIN: CPT | Mod: ZP | Performed by: INTERNAL MEDICINE

## 2019-11-13 PROCEDURE — 93926 LOWER EXTREMITY STUDY: CPT | Mod: LT

## 2019-11-13 PROCEDURE — 93922 UPR/L XTREMITY ART 2 LEVELS: CPT

## 2019-11-13 RX ORDER — LEVOTHYROXINE SODIUM 50 UG/1
50 TABLET ORAL DAILY
Qty: 90 TABLET | Refills: 3 | Status: SHIPPED | OUTPATIENT
Start: 2019-11-13 | End: 2020-09-24

## 2019-11-13 ASSESSMENT — MIFFLIN-ST. JEOR: SCORE: 1201.74

## 2019-11-13 NOTE — PROGRESS NOTES
SUBJECTIVE:  CC:  Follow-up visit  Evaluation and management of left lower extremity swelling with known history of lymphedema  History of left lower extremity arterial bypass for PAD  She broke her left hip in April 2019 while in [place] underwent ORIF followed by left total hip replacement on October 4, 2019 at [facility].  Accompanied by her   Back spasms better with periodic Flexeril  Still leg swelling and slightly improved knee mobility  Review of recent labs    HPI:   Angeli Jacobson is a 69 year old very pleasant female with past medical history of left thigh/groin sarcoma underwent surgery with lymphadenectomy and radiation therapy in the year 2000 at [facility] followed by she developed peripheral arterial disease underwent  Redo left common femoral to a bony popliteal artery bypass with 6 mm PTFE graft in January 2019 and since then reasonably doing well as for as the PAD concerned visited [place] in April and she fell down broke her left hip underwent ORIF over there.   She has a long-standing history of lymphedema after sarcoma surgery and radiation therapy.  Using compression stockings etc..  She was also evaluated extensively at lymphedema clinic and currently using pump 1 over a day  On October 4, 2019 she underwent left total hip replacement and now slightly worsening left leg swelling and here today for further evaluation.  Postoperatively she was having back spasms due to positional discomfort and treated with Flexeril and they are better now.  She has been taking both Plavix and aspirin.  She accompanied by her  for this visit today  Few months ago left lower extremity arterial duplex and ADRIAN Dopplers were good    HISTORIES:  PROBLEM LIST:   Patient Active Problem List   Diagnosis     Pain in joint, multiple sites     MULTINODUL GOITER     Hearing loss     Hyperlipidemia LDL goal <70     Osteoporosis     Cervicalgia     Major depressive  disorder, recurrent episode, moderate (H)     Impaired fasting glucose     Lymphedema of left leg     Tubular adenoma     Other constipation     Vertigo     Myxoid liposarcoma (HCC)     PAD (peripheral artery disease) (H)     Vascular graft occlusion (H)     Femoral-popliteal bypass graft occlusion, left (H)     History of sarcoma     S/P ORIF (open reduction internal fixation) fracture     Hip pain, left     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)     Urge incontinence of urine     Postural urinary incontinence     Personal history of urinary tract infection     OAB (overactive bladder)     Myalgia of pelvic floor     Pelvic floor dysfunction     Lesion of bladder     S/P total hip arthroplasty     Acute pain of left knee     PAST MEDICAL HISTORY:  Past Medical History:   Diagnosis Date     * * * SBE PROPHYLAXIS * * * 1998    Amox 500mg, take 4 tabs one hour prior to procedure.Takes this because of lymphedema secondary from leg surgey     Central serous retinopathy 2001    Resolved 9/2001     CHRONIC NECK PAIN 1995     Depressive disorder      Depressive disorder, not elsewhere classified 2001     History of blood transfusion 04/2019    during left hip pinning     MIXED HYPERLIPIDEMIA, LDL GOAL <160 1998    LDL goal < 160     Motion sickness      MYXOID LIPOSARCOMA 2000    Left thigh, S/P excision, radiation  at [facility]     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    CHRONIC LEFT THIGH LYMPHEDEMA     Nontoxic multinodular goiter 2005    needs yearly US     Osteoporosis, unspecified 2001     Other lymphedema 2000    left thigh, gets regular PT for this     PAD (peripheral artery disease) (H) 4/20/2018     PONV (postoperative nausea and vomiting)      SHINGLES 2001     Sprain of lumbosacral (joint) (ligament) 1995    right     Unspecified hearing loss 1998    chronic tinnitus     Unspecified tinnitus 1998     PAST SURGICAL HISTORY:  Past Surgical History:   Procedure Laterality Date     ARTHROPLASTY HIP Left  10/4/2019    Procedure: Removal of left femoral hardware with a conversion to left total hip arthroplasty using a Biomet Annalisa femoral stem with an OsseoTi acetabular shell and a dual mobility bearing surface;  Surgeon: Spencer Celeste MD;  Location: [facility] OR     BYPASS GRAFT FEMOROPOPLITEAL Left 1/21/2019    Procedure: LEFT FEMORAL TO ABOVE KNEE POPLITEAL  BYPASS WITH POLYTETRAFLUOROETHYLENE GRAFT;  Surgeon: Shade Owens MD;  Location: [facility] OR     C APPENDECTOMY      Appendectomy     C NONSPECIFIC PROCEDURE  04/2000    Open Biopsy Left Thigh Liposarcoma     COLONOSCOPY N/A 2/5/2016    Procedure: COMBINED COLONOSCOPY, SINGLE OR MULTIPLE BIOPSY/POLYPECTOMY BY BIOPSY;  Surgeon: Varun Stanley MD, MD;  Location:  GI     CYSTOSCOPY       EXCISION MALIG LESION>1.25CM  5/2000    Myxoid Liposarcoma       EXPLORE GROIN Right 5/1/2018    Procedure: EXPLORE GROIN;  EMERGENCY RIGHT FEMORAL EXPLORATION WITH FEMORAL ARTERY REPAIR.    EBL: 50mL;  Surgeon: Shade Owens MD;  Location: [facility] OR     HC COLONOSCOPY THRU STOMA, DIAGNOSTIC  2006 due 2010     HC COLP CERVIX/UPPER VAGINA  07/1997    Negative     HC DILATION/CURETTAGE DIAG/THER NON OB  02/1997    Post menopausal bleeding on HRT, negative     HIP SURGERY Left 04/13/2019     IR ANGIOGRAM THROUGH CATHETER FOLLOW UP  12/20/2018     IR ANGIOGRAM THROUGH CATHETER FOLLOW UP  12/21/2018     IR LOWER EXTREMITY ANGIOGRAM LEFT  12/19/2018     VASCULAR SURGERY  04/30/2018    Left SFA stent in bypass graft     CURRENT MEDICATIONS:  Current Outpatient Medications   Medication Sig Dispense Refill     Acetaminophen 325 MG CAPS Take 500 mg by mouth 2 times daily as needed        alendronate (FOSAMAX) 70 MG tablet Take 1 tablet (70 mg) by mouth with 8oz water every 7 days 30 minutes before breakfast and remain upright during this time. 12 tablet 3     aspirin (ASA) 81 MG chewable tablet Take 81 mg by mouth daily       azelastine (ASTELIN) 0.1 % nasal spray Spray 1 spray  in nostril daily as needed   10     calcium carbonate 600 mg-vitamin D 400 units (CALTRATE) 600-400 MG-UNIT per tablet Take 1 chew tab by mouth daily       clopidogrel (PLAVIX) 75 MG tablet Take 1 tablet (75 mg) by mouth daily 90 tablet 3     cyclobenzaprine (FLEXERIL) 10 MG tablet Take 1 tablet (10 mg) by mouth 2 times daily as needed for muscle spasms 30 tablet 1     ezetimibe (ZETIA) 10 MG tablet Take 1 tablet (10 mg) by mouth daily 90 tablet 3     levothyroxine (SYNTHROID/LEVOTHROID) 50 MCG tablet Take 1 tablet (50 mcg) by mouth daily 90 tablet 3     multivitamin, therapeutic (THERA-VIT) TABS tablet Take 1 tablet by mouth daily       nitroFURantoin macrocrystal-monohydrate (MACROBID) 100 MG capsule Take 1 tablet after intercourse 30 capsule 3     order for DME Equipment being ordered: Walker Wheels () and Walker ()  Treatment Diagnosis: difficulty walking 1 each 0     order for DME Equipment being ordered: Left Thigh High Compression Stocking, 550 CCL.3 1 each 99     ORDER FOR DME Equipment being ordered: lymphedema bandages 2 Device 1     oxybutynin ER (DITROPAN-XL) 5 MG 24 hr tablet Take 1 tablet (5 mg) by mouth daily 90 tablet 1     oxyCODONE-acetaminophen (PERCOCET) 5-325 MG tablet Take 1-2 tablets by mouth every 4 hours as needed for severe pain 60 tablet 0     rosuvastatin (CRESTOR) 40 MG tablet Take 1 tablet (40 mg) by mouth every evening 90 tablet 3     senna-docusate (SENOKOT-S/PERICOLACE) 8.6-50 MG tablet Take 1 tablet by mouth 2 times daily 30 tablet 0     ALLERGIES:  Allergies   Allergen Reactions     Celexa [Citalopram Hydrobromide]      Decreased libido     SOCIAL HISTORY:  Social History     Socioeconomic History     Marital status:      Spouse name: Chris     Number of children: 3     Years of education: 14     Highest education level: Associate degree: academic program   Occupational History     Occupation: at home     Employer: NONE    Social Needs     Financial resource  strain: Not hard at all     Food insecurity:     Worry: Never true     Inability: Never true     Transportation needs:     Medical: No     Non-medical: No   Tobacco Use     Smoking status: Never Smoker     Smokeless tobacco: Never Used   Substance and Sexual Activity     Alcohol use: No     Frequency: Monthly or less     Drinks per session: 1 or 2     Binge frequency: Never     Drug use: No     Sexual activity: Yes     Partners: Male     Birth control/protection: Post-menopausal   Lifestyle     Physical activity:     Days per week: 0 days     Minutes per session: 0 min     Stress: Not at all   Relationships     Social connections:     Talks on phone: More than three times a week     Gets together: Once a week     Attends Mandaeism service: More than 4 times per year     Active member of club or organization: No     Attends meetings of clubs or organizations: Never     Relationship status:      Intimate partner violence:     Fear of current or ex partner: Not on file     Emotionally abused: Not on file     Physically abused: Not on file     Forced sexual activity: Not on file   Other Topics Concern     Parent/sibling w/ CABG, MI or angioplasty before 65F 55M? No   Social History Narrative     Not on file     FAMILY HISTORY:  Family History   Problem Relation Age of Onset     C.A.D. Father         MI 57     Alcohol/Drug Father         etoh     Obesity Mother      Osteoporosis Mother      Colon Cancer Brother 70     Hyperlipidemia Son      Hyperlipidemia Son         very high, experimental drug     C.A.D. Paternal Grandmother         ascvd     Diabetes Maternal Grandmother      Cancer Maternal Grandmother      C.A.D. Paternal Uncle         Mi  age 48     Cancer Maternal Aunt         pancreatic CA     REVIEW OF SYSTEMS:  CONSTITUTIONAL:no malaise, fatigue, or other general symptoms  EYES: no subjective changes in visual acuity, no photophobia  ENT/MOUTH: no complaints of rhinorrhea, nasal congestion, sore  "throat, hearing changes  RESP:no SOB  CV: no c/o exertional chest pressure or SALDANA  GI: No abdominal pain, constipation, change in bowel movements, nausea, pyrosis, BRBPR  :no polyuria or polydipsia, no dysuria, no gross hematuria  MUSCULOSKELATAL: Recent history of left hip fracture underwent ORIF then followed by October 4, 2019 left total hip arthroplasty now with worsening leg swelling and also history of underlying lymphedema.  INTEGUMENTARY/SKIN: no pruritis, rashes, or moles with recent change in size, shape, or pigmentation  NEURO: no gross sensory or motor symptoms, no dizziness, no confusion  ENDOCRINE: no polyuria or polydipsia, no heat or cold intolerance  HEME/ALLERGY/IMMUNE: no fevers, chills, night sweats, or unwanted weight loss  PSYCHIATRIC: no depression, anxiety, or internal stimuli  EXAM:  /74 (BP Location: Right arm, Patient Position: Chair, Cuff Size: Adult Regular)   Pulse 78   Resp 16   Ht 5' 5\" (1.651 m)   Wt 149 lb (67.6 kg)   LMP 03/18/2005   SpO2 98%   BMI 24.79 kg/m    BMI: Body mass index is 24.79 kg/m .  GENERAL APPEARANCE:  Pleasant  Healthy appearing male , alert, active, no distress cooperative.  EXAM:  EYES: clear conjunctiva, no cataracts, no obvious fundoscopic abnormalities  HENT: oropharynx, nares, and TMs are WNL  NECK: no JVD, thyromegaly or lymphadenopathy, no cervical bruits  RESP: clear to auscultation without rales, wheezes, or rhonchi  CV: RRR, no murmurs, gallops, or rubs  LYMPH: no cervical , axillary, or inguinal lymphadenopathy appreciated  GI: NABS, ND/NT, no masses or organomegally appreciated  : normal external genitalia and anus, no lumps, masses  MS: vascular: Left lower extremity much larger than right side with classical features of lymphedema  Dressing in place on the left lateral upper part of the hip area from her recent surgery and this is blood-tinged.  Due to swelling difficulty to palpate the pulses but she has excellent Bi-triphasic " dopplerable pulses in both legs.  Both feet are well perfused no foot ulcers  SKIN: no nevi clinically suspicious for malignancy are noted  NEURO: CN II-XII intact, no localizing sensory or motor abnoramlities noted, DTRs symmetrical bilaterally  PSYCH: Mental status exam reveals the pt to have normal mood and affect. There is no disorder of thought form or content. There is no response to internal stimuli. There is no suicidal or homicidal ideation.      ULTRASOUND LOWER EXTREMITY VENOUS DUPLEX LEFT  10/10/2019 11:02 AM     CLINICAL HISTORY/INDICATION: Lymphedema of left leg. Status post total  replacement of left hip. Status post ORIF (open reduction internal  fixation) fracture.     COMPARISON: None relevant     TECHNIQUE: Grayscale, color-flow, and spectral waveform analysis were  performed of the deep veins of the left lower extremity     FINDINGS: The left common femoral vein, femoral vein, popliteal vein  and posterior tibial veins demonstrate normal compressibility,  spectral waveform, color flow and augmentation. The peripheral femoral  vein was not visualized.     The contralateral right common femoral vein demonstrates normal  compressibility, spectral waveform, color flow and augmentation.                                                                      IMPRESSION: No deep vein thrombosis in the left lower extremity.     GEORGE HOLLEY DO    A/P:  (I89.0) Lymphedema of left leg  (primary encounter diagnosis)  (Z96.642) Status post total replacement of left hip 10/4/2019  (Z98.890,  Z87.81) S/P ORIF (open reduction internal fixation) fracture 4/2019 in [place]   (C49.9) Myxoid liposarcoma (H) of left thigh s/p surgery annd XRT at [facility] in 2000.  (Z95.828) History of arterial bypass of Left lower extremity  (E78.5) Hyperlipidemia LDL goal <70  Hypothyroidism on replacement , normalized TSH and FT4 index after recent adjustment of the dose of levothyroxine to 50 mcg daily    This is a very  britt female well-known to me from her previous admissions to the [facility] with extensive past medical and past surgical history as delineated able underwent October 4th 2019 left total hip arthroplasty and prior to that in April 2019 she underwent open reduction internal fixation of left hip in [place] due to fall related fracture now with worsening left leg swelling and known history of a left lower extremity lymphedema secondary to liposarcoma surgery with radiation therapy in 2000.  She was extensively evaluated in the past at lymphedema therapy underwent MLD then followed by compression stockings and currently using the pump with minimal response.  After these 2 surgeries leg swelling got worse it is slowly getting better.  She denies any chest pain, shortness of breath or palpitations  Back spasms better with Flexeril  Reviewed recent imaging studies, op report and laboratory data in the epic  During last visit obtained left lower extremity venous duplex at [facility] which is negative for DVT.    Reviewed recent labs all of them are good and normal and improved thyroid function tests    Plan:  Continue to utilize compression stocking and elevate the leg with blue wedge leg elevator as tolerated  Continue to utilize lymphedema pump and increase duration 1 hour in the morning and 1 over in the evening or as tolerated  For spasms Flexeril 10 mg twice daily as needed basis  Follow-up with orthopedic surgery  Continue levothyroxine dose to 50 mcg daily  Continue rest of the medications same  Arrange for left lower extremity arterial duplex and ADRIAN before next visit approximately in a month.  Fasting lipids tomorrow before her primary care appointment order placed.  Follow-up with Dr. Owens as scheduled next week    Total patient care time spent today more than 40  minutes face-to-face, more than 50% of the time spent counseling of the above-mentioned multiple issues.   Return to clinic in 6  months    This note was dictated by utilizing dragon software    I have discussed with patient the risks, benefits, medications, treatment options and modalities.   I have instructed the patient to call or schedule a follow-up appointment if any problems or failure to improve.    Copy of this dictation to primary care provider and Dr. Roman Prince MD,[facility]  Vascular Medicine

## 2019-11-13 NOTE — PATIENT INSTRUCTIONS
Use compression stockings, elevate leg, use pump therapy and increase duration  as tolerated     Continue current medications    Follow up with ortho and  as scheduled.    Fasting lipids at primary tomorrow AM

## 2019-11-13 NOTE — PROGRESS NOTES
"Angeli Jacobson is a 69 year old female who presents for:  Chief Complaint   Patient presents with     RECHECK     IVETTE Burkett (12:45 [facility], 2:00 [facility]) 1 mo f/u to 10/10/19 appt - labs done 11/06/19 - Hx 69-yr-old female status post redo of a L femoral to above-knee popliteal artery PTFE bypass graft.         Vitals:    Vitals:    11/13/19 1402   BP: 122/74   BP Location: Right arm   Patient Position: Chair   Cuff Size: Adult Regular   Pulse: 78   Resp: 16   SpO2: 98%   Weight: 149 lb (67.6 kg)   Height: 5' 5\" (1.651 m)       BMI:  Estimated body mass index is 24.79 kg/m  as calculated from the following:    Height as of this encounter: 5' 5\" (1.651 m).    Weight as of this encounter: 149 lb (67.6 kg).    Pain Score:  Data Unavailable        Dennis Powell, EMELY    "

## 2019-11-14 ENCOUNTER — OFFICE VISIT (OUTPATIENT)
Dept: FAMILY MEDICINE | Facility: CLINIC | Age: 69
End: 2019-11-14
Payer: COMMERCIAL

## 2019-11-14 ENCOUNTER — TRANSFERRED RECORDS (OUTPATIENT)
Dept: HEALTH INFORMATION MANAGEMENT | Facility: CLINIC | Age: 69
End: 2019-11-14

## 2019-11-14 VITALS
RESPIRATION RATE: 18 BRPM | OXYGEN SATURATION: 98 % | BODY MASS INDEX: 24.79 KG/M2 | TEMPERATURE: 97.5 F | SYSTOLIC BLOOD PRESSURE: 119 MMHG | DIASTOLIC BLOOD PRESSURE: 73 MMHG | HEART RATE: 83 BPM | WEIGHT: 149 LBS

## 2019-11-14 DIAGNOSIS — T82.898S OCCLUSION OF LEFT FEMOROPOPLITEAL BYPASS GRAFT, SEQUELA: ICD-10-CM

## 2019-11-14 DIAGNOSIS — F33.1 MAJOR DEPRESSIVE DISORDER, RECURRENT EPISODE, MODERATE (H): ICD-10-CM

## 2019-11-14 DIAGNOSIS — I89.0 LYMPHEDEMA OF LEFT LEG: ICD-10-CM

## 2019-11-14 DIAGNOSIS — N32.81 OAB (OVERACTIVE BLADDER): Primary | ICD-10-CM

## 2019-11-14 DIAGNOSIS — E78.5 HYPERLIPIDEMIA LDL GOAL <70: ICD-10-CM

## 2019-11-14 LAB
CHOLEST SERPL-MCNC: 196 MG/DL
HDLC SERPL-MCNC: 53 MG/DL
LDLC SERPL CALC-MCNC: 120 MG/DL
NONHDLC SERPL-MCNC: 143 MG/DL
TRIGL SERPL-MCNC: 114 MG/DL

## 2019-11-14 PROCEDURE — 80061 LIPID PANEL: CPT | Performed by: INTERNAL MEDICINE

## 2019-11-14 PROCEDURE — 99214 OFFICE O/P EST MOD 30 MIN: CPT | Performed by: PHYSICIAN ASSISTANT

## 2019-11-14 PROCEDURE — 36415 COLL VENOUS BLD VENIPUNCTURE: CPT | Performed by: INTERNAL MEDICINE

## 2019-11-14 RX ORDER — VENLAFAXINE HYDROCHLORIDE 37.5 MG/1
37.5 TABLET, EXTENDED RELEASE ORAL DAILY
Qty: 90 TABLET | Refills: 1 | Status: CANCELLED | OUTPATIENT
Start: 2019-11-14

## 2019-11-14 NOTE — PROGRESS NOTES
Subjective     Angeli Jacobson is a 69 year old female who presents to clinic today for the following health issues:    \A [facility]ology of [place] [facility]\""       Hospital Follow-up Visit:    Hospital/Nursing Home/IP Rehab Facility: [facility]  Date of Admission: 10/04  Date of Discharge: 10/07  Reason(s) for Admission: hip replacement            Problems taking medications regularly:  None       Medication changes since discharge: Thyroid med has been doubled.       Problems adhering to non-medication therapy:  None    Summary of hospitalization:  [facility] discharge summary reviewed  Diagnostic Tests/Treatments reviewed.  Follow up needed: none  Other Healthcare Providers Involved in Patient s Care:         vascular, lymphedema clinic, urology  Update since discharge: improved.     Post Discharge Medication Reconciliation: discharge medications reconciled, continue medications without change.  Plan of care communicated with patient and family     Coding guidelines for this visit:  Type of Medical   Decision Making Face-to-Face Visit       within 7 Days of discharge Face-to-Face Visit        within 14 days of discharge   Moderate Complexity [id number]   High Complexity [id number]            Patient wondering if she should go back on SNRI for mood. Has seen Psychiatry in the past  Was using Effexor 37.5 mg, stopped 2 months ago.    SE with Ditropan, dry mouth.         Reviewed and updated as needed this visit by Provider         Review of Systems   ROS COMP: Constitutional, HEENT, cardiovascular, pulmonary, gi and gu systems are negative, except as otherwise noted.      Objective    LMP 03/18/2005   There is no height or weight on file to calculate BMI.  Physical Exam   GENERAL APPEARANCE: healthy, alert and no distress  RESP: lungs clear to auscultation - no rales, rhonchi or wheezes  CV: regular rates and rhythm, normal S1 S2, no S3 or S4 and no murmur, click or rub  PSYCH: mentation appears normal and affect  normal/bright            Assessment & Plan     1. OAB (overactive bladder)  Will continue ditropan  Try Biotene for dry mouth  Consult with urology    2. Lymphedema of left leg  Continue therapy    3. Major depressive disorder, recurrent episode, moderate (H)  Discussed risks/benefits of restarting medication.  Pt will go home and consider this more.   She may call back with decision  We did discuss Cymbalta use.     4. Occlusion of left femoropopliteal bypass graft, sequela  Continue care, follow-up with vascular.             No follow-ups on file.    Sepideh Burris PA-C  [facility]

## 2019-11-18 ENCOUNTER — THERAPY VISIT (OUTPATIENT)
Dept: PHYSICAL THERAPY | Facility: CLINIC | Age: 69
End: 2019-11-18
Payer: COMMERCIAL

## 2019-11-18 DIAGNOSIS — M25.562 ACUTE PAIN OF LEFT KNEE: ICD-10-CM

## 2019-11-18 PROCEDURE — 97110 THERAPEUTIC EXERCISES: CPT | Mod: GP | Performed by: PHYSICAL THERAPIST

## 2019-11-19 ENCOUNTER — OFFICE VISIT (OUTPATIENT)
Dept: OTHER | Facility: CLINIC | Age: 69
End: 2019-11-19
Attending: SURGERY
Payer: COMMERCIAL

## 2019-11-19 VITALS — DIASTOLIC BLOOD PRESSURE: 78 MMHG | HEART RATE: 72 BPM | SYSTOLIC BLOOD PRESSURE: 125 MMHG

## 2019-11-19 DIAGNOSIS — I73.9 PAD (PERIPHERAL ARTERY DISEASE) (H): Primary | ICD-10-CM

## 2019-11-19 PROCEDURE — 99213 OFFICE O/P EST LOW 20 MIN: CPT | Mod: ZP | Performed by: SURGERY

## 2019-11-19 PROCEDURE — G0463 HOSPITAL OUTPT CLINIC VISIT: HCPCS

## 2019-11-19 NOTE — PROGRESS NOTES
Vascular Surgery Clinic note     70 yo female s/p left redo femoral to above knee popliteal artery bypass with 6 mm PTFE.  She is doing well and has no claudication symptoms in the left leg.  She continues to wear lymphedema compression hose for chronic lymphedema from left groin sarcoma resection with lymphadenectomy and radiation.  Ms. Jacobson also had a left hip fracture in April that has been fixated.  Today we reviewed the imaging results and it appears the bypass graft is function very well and there appear to be no concerns.       Physical Examination:  /78 (BP Location: Left arm, Patient Position: Chair, Cuff Size: Adult Regular)   Pulse 72   LMP 03/18/2005   Breastfeeding No   Gen: NAD, walking with the assistance of a cane now.    HEENT: PERRLA, mucous membranes moist  Ext:  Warm and well perfused.  Left DP pulse is palpable 2+.     Imaging:    PROCEDURE:   ADRIAN with Doppler Waveforms of the bilateral lower extremities     DATE OF PROCEDURE:   11/13/2019 1:36 PM     CLINICAL HISTORY/INDICATION:  Lymphedema of left leg; History of arterial bypass of lower extremity;  PAD (peripheral artery disease) (H)     COMPARISON:  ABIs 5/8/2019     TECHNIQUE:  Resting ankle branchial indices and waveform analysis of the bilateral  lower extremities     FINDINGS:     Right:  Resting ADRIAN: 1.11   Wave forms: Triphasic      Left:  Resting ADRIAN: 1.13   Wave forms: Triphasic                                                                       Impression:  Normal resting ABIs bilaterally         ADRIAN Diagnostic Criteria       >/= 1.3          Non compressible   0.95 - 1.29     Normal   0.90 - 0.94     Mild PAD   0.50 - 0.89     Moderate PAD   0.20 - 0.49     Severe PAD   < 0.20             Critical PAD     MARIA M ROCK MD    ULTRASOUND LEFT LOWER EXTREMITY ARTERIAL DUPLEX   11/13/2019 1:36 PM      HISTORY:  Status post redo of a left femoral to AK popliteal artery  PTFE bypass graft. Lymphedema of left leg. History  of arterial bypass  of lower extremity. PAD (peripheral artery disease) (H).     COMPARISON: 5/28/2019.     FINDINGS: Color Doppler and spectral waveform analysis was performed  throughout the left femoral to above-knee popliteal artery bypass. The  bypass is patent with diameters ranging between 4.7 and 6.8 mm. Inflow  and outflow arteries are patent.                                                                      IMPRESSION: Patent left femoral to above-knee popliteal artery bypass.     GEORGE HOLLEY,       Assessment:  Almost 1 year status post redo left femoral to above-knee popliteal bypass with 6 mm PTFE graft doing well from the vascular surgical standpoint.      Plan:      She is on ASA/plavix and doing well.  I have no concerns regarding the bypass at this time after reviewing the imaging today.  She will follow up with Dr. Eljiah Owens in 1 year in clinic with repeat ADRIAN's and arterial duplex of the left leg.  I spent 15 minutes of face-to-face time, > 50% spent counseling and coordinating care.     Tanner Galdamez MD  Vascular Surgery

## 2019-11-19 NOTE — NURSING NOTE
"Angeli Jacobson is a 69 year old female who presents for:  Chief Complaint   Patient presents with     Nurse Visit     Hx of lower extremity arterial bypass for PAD.        Vitals:    Vitals:    11/19/19 0906   BP: 125/78   BP Location: Left arm   Patient Position: Chair   Cuff Size: Adult Regular   Pulse: 72       BMI:  Estimated body mass index is 24.79 kg/m  as calculated from the following:    Height as of 11/13/19: 5' 5\" (1.651 m).    Weight as of 11/14/19: 149 lb (67.6 kg).    Pain Score:  Data Unavailable        Huong Bowser MA    "

## 2019-11-26 ENCOUNTER — TELEPHONE (OUTPATIENT)
Dept: PHYSICAL THERAPY | Facility: CLINIC | Age: 69
End: 2019-11-26

## 2019-12-02 ENCOUNTER — THERAPY VISIT (OUTPATIENT)
Dept: PHYSICAL THERAPY | Facility: CLINIC | Age: 69
End: 2019-12-02
Payer: COMMERCIAL

## 2019-12-02 DIAGNOSIS — M25.562 ACUTE PAIN OF LEFT KNEE: ICD-10-CM

## 2019-12-02 PROCEDURE — 97110 THERAPEUTIC EXERCISES: CPT | Mod: GP | Performed by: PHYSICAL THERAPIST

## 2019-12-12 DIAGNOSIS — E04.2 NONTOXIC MULTINODULAR GOITER: ICD-10-CM

## 2019-12-12 RX ORDER — LEVOTHYROXINE SODIUM 25 UG/1
TABLET ORAL
Qty: 90 TABLET | Refills: 0 | OUTPATIENT
Start: 2019-12-12

## 2019-12-12 NOTE — TELEPHONE ENCOUNTER
"Patient has refills remaining with requesting pharmacy.  Jyoti SANDERSON - Registered Nurse  [facility]  Acute and Diagnostic Services          Requested Prescriptions   Pending Prescriptions Disp Refills     levothyroxine (SYNTHROID/LEVOTHROID) 25 MCG tablet [Pharmacy Med Name: LEVOTHYROXINE 25 MCG TABLET] 90 tablet 0     Sig: TAKE 1 TABLET BY MOUTH EVERY DAY       Thyroid Protocol Passed - 12/12/2019  5:02 AM        Passed - Patient is 12 years or older        Passed - Recent (12 mo) or future (30 days) visit within the authorizing provider's specialty     Patient has had an office visit with the authorizing provider or a provider within the authorizing providers department within the previous 12 mos or has a future within next 30 days. See \"Patient Info\" tab in inbasket, or \"Choose Columns\" in Meds & Orders section of the refill encounter.              Passed - Medication is active on med list        Passed - Normal TSH on file in past 12 months     Recent Labs   Lab Test 11/06/19  0909   TSH 3.05  3.05              Passed - No active pregnancy on record     If patient is pregnant or has had a positive pregnancy test, please check TSH.          Passed - No positive pregnancy test in past 12 months     If patient is pregnant or has had a positive pregnancy test, please check TSH.            "

## 2019-12-30 ENCOUNTER — TRANSFERRED RECORDS (OUTPATIENT)
Dept: HEALTH INFORMATION MANAGEMENT | Facility: CLINIC | Age: 69
End: 2019-12-30

## 2019-12-30 ENCOUNTER — MYC MEDICAL ADVICE (OUTPATIENT)
Dept: OTHER | Facility: CLINIC | Age: 69
End: 2019-12-30

## 2019-12-30 NOTE — TELEPHONE ENCOUNTER
Patient requested refill of levothyroxine.  This med was prescribed 11/13/19 with a one year supply.    Responded to patient via [employer].  Barbara Greenwood RN BSN  [facility]

## 2019-12-31 NOTE — TELEPHONE ENCOUNTER
Fax received requesting new Rx for levothyroxine 100 mg.     Patient dose was increased 10/10/19 from 25 mcg to 50 mcg.  She used up her 25 mcg tablets (taking two daily).    She then told the pharmacy she needed two daily, but the Rx had already been increased to 50 mcg on 11-13-19.    Current dose:  50 mcg daily.    Returned fax with this information to [facility].  Barbara Greenwood RN BSN  Vascular [facility]

## 2020-01-06 ENCOUNTER — MYC MEDICAL ADVICE (OUTPATIENT)
Dept: FAMILY MEDICINE | Facility: CLINIC | Age: 70
End: 2020-01-06

## 2020-01-21 NOTE — PROGRESS NOTES
Discharge Note    Progress reporting period is from initial eval to Dec 2, 2019.     Angeli failed to return for next follow up visit and current status is unknown.  Please see information below for last relevant information on current status.  Patient seen for Rxs Used: 4 visits.  SUBJECTIVE  Subjective changes noted by patient:  Subjective: Knee soreness with HEP, lessened intensity and doing better.  Pt   swelling variable  .  Pt failed to return after vacation.  Current pain level is Current Pain level: 3/10.     Previous pain level was  Initial Pain level: 7/10.   Changes in function:  Yes (See Goal flowsheet attached for changes in current functional level)  Adverse reaction to treatment or activity: None    OBJECTIVE  Changes noted in objective findings: Objective: L knee PROM: 0-0-86     ASSESSMENT/PLAN  Diagnosis: L knee pain S/P L hip surgery   DIAGP:  The encounter diagnosis was Acute pain of left knee.  Updated problem list and treatment plan:   Pain - HEP  Decreased ROM/flexibility - HEP  Decreased function - HEP  Decreased strength - HEP  Impaired muscle performance - HEP  Impaired gait - HEP  Decreased proprioception - HEP  STG/LTGs have been met or progress has been made towards goals:  Yes, please see goal flowsheet for most current information  Assessment of Progress: current status is unknown.  Last current status: Assessment of progress: Pt is progressing as expected   Self Management Plans:  HEP  I have re-evaluated this patient and find that the nature, scope, duration and intensity of the therapy is appropriate for the medical condition of the patient.  Angeli continues to require the following intervention to meet STG and LTG's:  HEP.    Recommendations:  Discharge with current home program.  Patient to follow up with MD as needed.    Please refer to the daily flowsheet for treatment today, total treatment time and time spent performing 1:1 timed codes.

## 2020-02-11 ENCOUNTER — OFFICE VISIT (OUTPATIENT)
Dept: UROLOGY | Facility: CLINIC | Age: 70
End: 2020-02-11
Payer: COMMERCIAL

## 2020-02-11 VITALS
WEIGHT: 150 LBS | HEART RATE: 59 BPM | HEIGHT: 66 IN | BODY MASS INDEX: 24.11 KG/M2 | SYSTOLIC BLOOD PRESSURE: 124 MMHG | DIASTOLIC BLOOD PRESSURE: 68 MMHG

## 2020-02-11 DIAGNOSIS — N32.81 OAB (OVERACTIVE BLADDER): ICD-10-CM

## 2020-02-11 DIAGNOSIS — N39.41 URGE INCONTINENCE OF URINE: ICD-10-CM

## 2020-02-11 DIAGNOSIS — M62.89 PELVIC FLOOR DYSFUNCTION: ICD-10-CM

## 2020-02-11 DIAGNOSIS — N32.9 LESION OF BLADDER: Primary | ICD-10-CM

## 2020-02-11 LAB
ALBUMIN UR-MCNC: NEGATIVE MG/DL
APPEARANCE UR: CLEAR
BILIRUB UR QL STRIP: NEGATIVE
COLOR UR AUTO: YELLOW
GLUCOSE UR STRIP-MCNC: NEGATIVE MG/DL
HGB UR QL STRIP: ABNORMAL
KETONES UR STRIP-MCNC: NEGATIVE MG/DL
LEUKOCYTE ESTERASE UR QL STRIP: NEGATIVE
NITRATE UR QL: NEGATIVE
PH UR STRIP: 7 PH (ref 5–7)
SOURCE: ABNORMAL
SP GR UR STRIP: 1.02 (ref 1–1.03)
UROBILINOGEN UR STRIP-ACNC: 0.2 EU/DL (ref 0.2–1)

## 2020-02-11 PROCEDURE — 52000 CYSTOURETHROSCOPY: CPT | Performed by: UROLOGY

## 2020-02-11 PROCEDURE — 81003 URINALYSIS AUTO W/O SCOPE: CPT | Mod: QW | Performed by: UROLOGY

## 2020-02-11 ASSESSMENT — MIFFLIN-ST. JEOR: SCORE: 1222.15

## 2020-02-11 ASSESSMENT — PAIN SCALES - GENERAL: PAINLEVEL: NO PAIN (0)

## 2020-02-11 NOTE — LETTER
"2/11/2020       RE: Angeli Jacobson  [id number]  [place] [id number]     Dear Colleague,    Thank you for referring your patient, Angeli Jacobson, to the [facility] [place] at [facility]. Please see a copy of my visit note below.    February 11, 2020    Return visit    Patient returns today for follow up.  States that her hip surgery she has had fewer infections and less constipation.  Also notes that leakage is better.  She is accompanied by her  today. She denies any changes in her health since last visit.    /68   Pulse 59   Ht 1.676 m (5' 6\")   Wt 68 kg (150 lb)   LMP 03/18/2005   BMI 24.21 kg/m     She is comfortable, in no distress, non-labored breathing.  Abdomen is soft, non-tender, non-distended.  Normal external female genitalia.  Negative CST.  Pelvic exam is remarkable for inability to engage her pelvic floor musculature.    Cystoscopy Note: After informed consent was obtained patient was prepped and draped in the standard fashion.  The flexible cystoscope was inserted into a normal appearing urethral meatus.  The urothelium was carefully examined and there were no tumors, masses, stones, foreign bodies, or other urothelial abnormalities noted.  Bilateral ureteral orifices were noted in the normal orthotopic position and both effluxed clear urine.  The cystoscope was retroflexed and the bladder neck was unremarkable.  The urethra was carefully examined upon removing the cystoscope and was unremarkable.  Patient tolerated the procedure without complications noted.      A/P: 69 year old F with OAB symptoms, urge incontinence, postural incontinence, and history of UTIs.    Lesion seen prior no longer seen so no need for further cystoscopy at this time    We then discussed that urge incontinence treatments include observation, weight loss, medications most commonly anticholinergics, physical " therapy, biofeedback, intravesical botulinum toxin, percutaneous tibial nerve stimulation and sacral neuromodulation.   She is on oxybutynin which she is not sure helps and is concerned about long term effects of this.  We discussed alternative medication versus considering a trial of pelvic floor therapy.  We discussed how the therapy works and that this is an alternative to medication    She wishes to try pelvic floor therapy.  As she is not sure if the oxybutynin is helping or causing any cognitive effects she was encouraged to stop the medication for a period of time to see and then if she feels that it was helping she can restart    RTC 4 months, sooner if needed    Jennifer Oquendo MD MPH   of Urology    CC  Patient Care Team:  Sepideh Burris PA-C as PCP - General (Physician Assistant)

## 2020-02-11 NOTE — NURSING NOTE
Chief Complaint   Patient presents with     History of OAB and urinary incontinence     Patient is here today for cystoscopy for history of OAB and urinary incontinence     Prior to the start of the procedure and with procedural staff participation, I verbally confirmed the patient s identity using two indicators, relevant allergies, that the procedure was appropriate and matched the consent or emergent situation, and that the correct equipment/implants were available. Immediately prior to starting the procedure I conducted the Time Out with the procedural staff and re-confirmed the patient s name, procedure, and site/side. I have wiped the patient off with the povidone-Iodine solution,and  draped them. (The Joint Commission universal protocol was followed.)  Yes    Sedation (Moderate or Deep): None    Viridiana Aquino, EMT

## 2020-02-11 NOTE — PATIENT INSTRUCTIONS
"Websites with free information:    [employer] patient website: www.voicesforpfd.org    Total Control Program: www.totalcontrolprogram.com    Please see one of the dedicated pelvic floor physical therapist ([facility] [phone])    Please return to see me in 4 months, sooner if needed    It was a pleasure meeting with you today.  Thank you for allowing me and my team the privilege of caring for you today.  YOU are the reason we are here, and I truly hope we provided you with the excellent service you deserve.  Please let us know if there is anything else we can do for you so that we can be sure you are leaving completely satisfied with your care experience.    AFTER YOUR CYSTOSCOPY  ?  ?  You have just completed a cystoscopy, or \"cysto\", which allowed your physician to learn more about your bladder (or to remove a stent placed after surgery). We suggest that you continue to avoid caffeine, fruit juice, and alcohol for the next 24 hours, however, you are encouraged to return to your normal activities.  ?  ?  A few things that are considered normal after your cystoscopy:  ?  * small amount of bleeding (or spotting) that clears within the next 24 hours  ?  * slight burning sensation with urination  ?  * sensation of needing to void (urinate) more frequently  ?  * the feeling of \"air\" in your urine  ?  * mild discomfort that is relieved with Tylenol    * bladder spasms  ?  ?  ?  Please contact our office promptly if you:  ?  * develop a fever above 101 degrees  ?  * are unable to urinate  ?  * develop bright red blood that does not stop  ?  * experience severe pain or swelling  ?  ?  ?  And of course, please contact our office with any concerns or questions [phone]  ?    "

## 2020-02-11 NOTE — PROGRESS NOTES
"February 11, 2020    Return visit    Patient returns today for follow up.  States that her hip surgery she has had fewer infections and less constipation.  Also notes that leakage is better.  She is accompanied by her  today. She denies any changes in her health since last visit.    /68   Pulse 59   Ht 1.676 m (5' 6\")   Wt 68 kg (150 lb)   LMP 03/18/2005   BMI 24.21 kg/m    She is comfortable, in no distress, non-labored breathing.  Abdomen is soft, non-tender, non-distended.  Normal external female genitalia.  Negative CST.  Pelvic exam is remarkable for inability to engage her pelvic floor musculature.    Cystoscopy Note: After informed consent was obtained patient was prepped and draped in the standard fashion.  The flexible cystoscope was inserted into a normal appearing urethral meatus.  The urothelium was carefully examined and there were no tumors, masses, stones, foreign bodies, or other urothelial abnormalities noted.  Bilateral ureteral orifices were noted in the normal orthotopic position and both effluxed clear urine.  The cystoscope was retroflexed and the bladder neck was unremarkable.  The urethra was carefully examined upon removing the cystoscope and was unremarkable.  Patient tolerated the procedure without complications noted.      A/P: 69 year old F with OAB symptoms, urge incontinence, postural incontinence, and history of UTIs.    Lesion seen prior no longer seen so no need for further cystoscopy at this time    We then discussed that urge incontinence treatments include observation, weight loss, medications most commonly anticholinergics, physical therapy, biofeedback, intravesical botulinum toxin, percutaneous tibial nerve stimulation and sacral neuromodulation.   She is on oxybutynin which she is not sure helps and is concerned about long term effects of this.  We discussed alternative medication versus considering a trial of pelvic floor therapy.  We discussed how the " therapy works and that this is an alternative to medication    She wishes to try pelvic floor therapy.  As she is not sure if the oxybutynin is helping or causing any cognitive effects she was encouraged to stop the medication for a period of time to see and then if she feels that it was helping she can restart    RTC 4 months, sooner if needed    Jennifer Oquendo MD MPH   of Urology    CC  Patient Care Team:  Sepideh Burris PA-C as PCP - General (Physician Assistant)  Sepideh Burris PA-C as Assigned PCP

## 2020-02-12 DIAGNOSIS — M81.0 OSTEOPOROSIS: ICD-10-CM

## 2020-02-12 DIAGNOSIS — Z13.21 ENCOUNTER FOR VITAMIN DEFICIENCY SCREENING: ICD-10-CM

## 2020-02-12 DIAGNOSIS — F33.0 MAJOR DEPRESSIVE DISORDER, RECURRENT EPISODE, MILD (H): Primary | ICD-10-CM

## 2020-02-12 PROCEDURE — 82306 VITAMIN D 25 HYDROXY: CPT | Performed by: PHYSICIAN ASSISTANT

## 2020-02-12 PROCEDURE — 36415 COLL VENOUS BLD VENIPUNCTURE: CPT | Performed by: PHYSICIAN ASSISTANT

## 2020-02-13 LAB — DEPRECATED CALCIDIOL+CALCIFEROL SERPL-MC: 38 UG/L (ref 20–75)

## 2020-02-17 ENCOUNTER — E-VISIT (OUTPATIENT)
Dept: FAMILY MEDICINE | Facility: CLINIC | Age: 70
End: 2020-02-17
Payer: COMMERCIAL

## 2020-02-17 DIAGNOSIS — R30.0 DYSURIA: Primary | ICD-10-CM

## 2020-02-17 PROCEDURE — 99421 OL DIG E/M SVC 5-10 MIN: CPT | Performed by: PHYSICIAN ASSISTANT

## 2020-02-18 RX ORDER — CEPHALEXIN 500 MG/1
500 CAPSULE ORAL 2 TIMES DAILY
Qty: 14 CAPSULE | Refills: 0 | Status: SHIPPED | OUTPATIENT
Start: 2020-02-18 | End: 2020-03-31

## 2020-03-10 ENCOUNTER — THERAPY VISIT (OUTPATIENT)
Dept: PHYSICAL THERAPY | Facility: CLINIC | Age: 70
End: 2020-03-10
Attending: UROLOGY
Payer: COMMERCIAL

## 2020-03-10 DIAGNOSIS — M62.89 PELVIC FLOOR DYSFUNCTION: ICD-10-CM

## 2020-03-10 DIAGNOSIS — N32.81 OAB (OVERACTIVE BLADDER): ICD-10-CM

## 2020-03-10 DIAGNOSIS — N39.41 URGE INCONTINENCE OF URINE: ICD-10-CM

## 2020-03-10 PROCEDURE — 97110 THERAPEUTIC EXERCISES: CPT | Mod: GP | Performed by: PHYSICAL THERAPIST

## 2020-03-10 PROCEDURE — 97161 PT EVAL LOW COMPLEX 20 MIN: CPT | Mod: GP | Performed by: PHYSICAL THERAPIST

## 2020-03-10 PROCEDURE — 97535 SELF CARE MNGMENT TRAINING: CPT | Mod: GP | Performed by: PHYSICAL THERAPIST

## 2020-03-10 NOTE — PROGRESS NOTES
Physical Therapy Initial Evaluation/Plan of Care    History of current episode:    Onset date/MD order: 2/11/20.    CC/Present symptoms: Angeli presents with urinary urgency and frequency. Has a prolapsed uterus, started noticing that ~2 years ago. Tried using a pessary, that wasn't helpful. Sent to urologist as she also has a history of UTI's.  Broke her hip 2x in 2019, that delayed physical therapy. Feels like when she has to go to the bathroom, can't delay it at all. Not able to hold urine at all. Being at home isn't an issue, but has a hard time when out and about. Sometimes isn't able to make it in time.   Pain rating (0-10): 0/10  Conditioning is improving/unchanging/worsening: unchanging    How problem began: not sure  Pelvic/Abdominal Surgeries: none  Hx of or present sexually transmitted disease:none  SMHx: broken hip 2019, changes in bowel/bladder, cancer, depression, history of fractures, osteoporosis  General Health: good  Current occupation: retired  Goals: reduce urinary urgency, improve bladder control  Red flags:none    # of pregnancies: 3  # of live births: 3  # vaginal deliveries: 3  # c-sections: 0  Complications: none    Relevant diagnostic tests: cystoscopy    Urination:  Do you leak on the way to the bathroom or with a strong urge to void? Yes   Do you leak with cough,sneeze, jumping, running?No   Any other activities that cause leaking? No   Do you have triggers that make you feel you can't wait to go to the bathroom? No - just feeling the pressure of having to go  Type of pad and number used per day? 1-2 light pads/day  When you leak what is the amount? Small/medium  How long can you delay the need to urinate? 1 - 2 minutes.   Do you feel excessive pressure in pelvic floor: no  Frequency of daytime urination: every 2 hours  Frequency of nighttime urination:1x/night  Can you stop the flow of urine when on the toilet? No  Is the volume of urine passed usually: average. (8sec rule= 250ml  with average bladder storing 400-600ml)  Do you strain to pass urine? No  Do you have a slow or hesitant urinary stream? No  Do you have difficulty initiating the urine stream? No  Is urination painful? No  How many bladder infections have you had in last 12 months? 4 (not sure)  Fluid intake(one glass is 8oz or one cup) 4-5 glasses water/day, 1 caffeinated glasses/day, 0 alcohol glasses/day.    Bowel habits:  Frequency of bowel movements? 7 times a week  Consistancy of stool? soft formed  Do you ignore the urge to defecate? No  Do you strain to pass stool? No     Pelvic Pain:  Do you have any pelvic pain with intercourse, exams, use of tampons? No  Have you practiced the PF(kegel) exercises for 4 or more weeks? no    Treatment/Education provided this session (see flow sheet for information):      Flexibility:   L R   Adductors WNL WNL   Hamstrings WNL WNL   Piriformis WNL WNL   Gluteals WNL WNL   Iliopsoas WNL WNL     Abdominal Wall  Diastasis recti: none noted  Trigger points: none    External Exam:  Skin condition: normal  Scars: none  Is tissue symmetrical?: yes  Introitus: normal  Muscle contraction - pelvic elevation present/slight/absent? slight  Perineal mobility - descent/no movement? No movement  Cystocele/Rectocele/none?: none noted on supine strain externally; uterine prolapse above level of hymen noted with internal assessment    Internal Exam    Sensation intact?: yes    Muscle exam   Neg Pain (-) Min Pain (+) Mod Pain (++) Severe Pain (+++) Increased tone?   Ischiocavernosus -   no    Bulbocavernosus -   no    Transverse Perineals -   no    Levator Ani -   no    Perineal Body -         Contraction Grade  0 (no contraction)    1 (flicker) X (initial)   2 (weak squeeze, 2 sec hold) X (with cueing)   3 (fair squeeze, definite lift)    4 (good squeeze, good hold, repeatable)    5 (strong squeeze, good hold, repeatable)      Accessory use of abdominals noted with contraction.    Patient is a 69 year old  female with pelvic complaints.    Patient has the following significant findings with corresponding treatment plan.                Diagnosis 1:  Urinary urgency, pelvic floor weakness  Pain -  manual therapy, self management, education and home program  Decreased strength - therapeutic exercise and therapeutic activities  Decreased proprioception - neuro re-education and therapeutic activities  Impaired muscle performance - neuro re-education  Decreased function - therapeutic activities    Therapy Evaluation Codes:   1) History comprised of:   Personal factors that impact the plan of care:      None.    Comorbidity factors that impact the plan of care are:      None.     Medications impacting care: OAB meds.  2) Examination of Body Systems comprised of:   Body structures and functions that impact the plan of care:      Pelvis.   Activity limitations that impact the plan of care are:      Urgency, Urge incontinence and Urinary incontinence.  3) Clinical presentation characteristics are:   Stable/Uncomplicated.  4) Decision-Making    Low complexity using standardized patient assessment instrument and/or measureable assessment of functional outcome.  Cumulative Therapy Evaluation is: Low complexity.    Previous and current functional limitations:  (See Goal Flow Sheet for this information)    Short term and Long term goals: (See Goal Flow Sheet for this information)     Communication ability:  Patient appears to be able to clearly communicate and understand verbal and written communication and follow directions correctly.  Treatment Explanation - The following has been discussed with the patient:   RX ordered/plan of care  Anticipated outcomes  Possible risks and side effects  This patient would benefit from PT intervention to resume normal activities.   Rehab potential is good.    Frequency:  1 X week, once daily x 3 weeks then tapering to 1x every other week x 6 weeks  Duration:  for 10 weeks  Discharge Plan:   Achieve all LTG.  Independent in home treatment program.  Reach maximal therapeutic benefit.    Please refer to the daily flowsheet for treatment today, total treatment time and time spent performing 1:1 timed codes.

## 2020-03-17 ENCOUNTER — THERAPY VISIT (OUTPATIENT)
Dept: PHYSICAL THERAPY | Facility: CLINIC | Age: 70
End: 2020-03-17
Payer: COMMERCIAL

## 2020-03-17 DIAGNOSIS — N39.41 URGE INCONTINENCE OF URINE: ICD-10-CM

## 2020-03-17 DIAGNOSIS — M62.89 PELVIC FLOOR DYSFUNCTION: ICD-10-CM

## 2020-03-17 PROCEDURE — 97110 THERAPEUTIC EXERCISES: CPT | Mod: GP | Performed by: PHYSICAL THERAPIST

## 2020-03-26 DIAGNOSIS — N39.46 MIXED INCONTINENCE URGE AND STRESS (MALE)(FEMALE): ICD-10-CM

## 2020-03-26 RX ORDER — OXYBUTYNIN CHLORIDE 5 MG/1
TABLET, EXTENDED RELEASE ORAL
Qty: 90 TABLET | Refills: 1 | Status: SHIPPED | OUTPATIENT
Start: 2020-03-26 | End: 2020-11-11 | Stop reason: ALTCHOICE

## 2020-03-26 NOTE — TELEPHONE ENCOUNTER
"Requested Prescriptions   Signed Prescriptions Disp Refills    oxybutynin ER (DITROPAN-XL) 5 MG 24 hr tablet 90 tablet 1     Sig: TAKE 1 TABLET BY MOUTH EVERY DAY       Muscarinic Antagonists (Urinary Incontinence Agents) Passed - 3/26/2020  4:10 AM        Passed - Recent (12 mo) or future (30 days) visit within the authorizing provider's specialty     Patient has had an office visit with the authorizing provider or a provider within the authorizing providers department within the previous 12 mos or has a future within next 30 days. See \"Patient Info\" tab in inbasket, or \"Choose Columns\" in Meds & Orders section of the refill encounter.              Passed - Medication is Oxybutynin and patient is 5 years of age or older        Passed - Patient does not have a diagnosis of glaucoma on the problem list     If glaucoma diagnosis is new, refer refill to physician.          Passed - Medication is active on med list        Passed - Patient is 18 years of age or older             "

## 2020-04-02 ENCOUNTER — VIRTUAL VISIT (OUTPATIENT)
Dept: PHYSICAL THERAPY | Facility: CLINIC | Age: 70
End: 2020-04-02
Payer: COMMERCIAL

## 2020-04-02 DIAGNOSIS — I73.9 PERIPHERAL VASCULAR DISEASE WITH CLAUDICATION (H): ICD-10-CM

## 2020-04-02 DIAGNOSIS — M25.562 ACUTE PAIN OF LEFT KNEE: ICD-10-CM

## 2020-04-02 PROCEDURE — 97110 THERAPEUTIC EXERCISES: CPT | Mod: 95 | Performed by: PHYSICAL THERAPIST

## 2020-04-02 RX ORDER — CLOPIDOGREL BISULFATE 75 MG/1
75 TABLET ORAL DAILY
Qty: 90 TABLET | Refills: 0 | Status: SHIPPED | OUTPATIENT
Start: 2020-04-02 | End: 2020-05-20

## 2020-04-02 NOTE — TELEPHONE ENCOUNTER
Requested Prescriptions   Pending Prescriptions Disp Refills     clopidogrel (PLAVIX) 75 MG tablet 90 tablet 0     Sig: Take 1 tablet (75 mg) by mouth daily       There is no refill protocol information for this order

## 2020-04-02 NOTE — PROGRESS NOTES
"Physical Therapy Virtual Follow Up Visit      The patient has been notified of following:     \"This virtual visit will be conducted between you and your provider. We have found that certain health care needs can be provided without the need for physical presence.  This service lets us provide the care you need with a virtual visit.\"    Due to external, as well as internal [facility] management of the COVID-19 Virus, Angeli Jacobson was not seen in our clinic.  As a substitution, we implemented a virtual visit to manage this patient's condition utilizing the PTRx virtual visit platform via the patient s existing code.  The provider, Billy Mann, reviewed the patient's chart, PTRx prescription, and spoke with the patient to determine the following telemedicine visit is appropriate and effective for the patient's care.    The following type of visit was completed:   Video Visit:  The [employer] platform uses a synchronous HIPAA compliant video stream for this patient encounter.  Pt used ipad as her computer had no camera attached to it.  The PTRX video/audio did work on her end, the audio worked on the therapist's side but the video did not.  PT was able to update her HEP as to give her more benefit to her L knee ROM and strength.       S: Angeli Jacobson is a 69 year old female. Connected virtually on the PTRx platform to discuss their condition/progress. The patient was last seen 12/2/19 for her L knee/hip.  She had continued concerns with her L knee stiffness and edema (pt has long Hx of Lymphedema and seen specialist for this in the past).  Pt also had a L GISSELL sugery about 6 months ago that is healing well, even though she has not had any outpatient PT for her L hip.  They noted ongoing pain or limitations with walking with a limp, knee stiffness and pain with edema.     Current pain level: No change      O: Patient demonstrated understanding of her new exercises and she will complete them " 1x/day.    PTRx Content from today's visit:  Exercise Name: Active Assisted Knee Flexion  Exercise Name: Seated Quad Set in Chair - Reps: 10-30x 1-2x/day, Notes: can do ball squeeze with both legs  Exercise Name: Short Arc Knee Extension  Exercise Name: Supine Heel Slides, Sets: 1 - Reps: 10-20x 2-3x/day, Notes: can use sheet to help pull back  Exercise Name: Isometric Quad, Sets: 1 - Reps: 10-30x 2-3x/day hold 5 seconds  Exercise Name: Bridging #1, Sets: 1 - Reps: up to 30 - Sessions: 1, Notes: Focus on squeezing your butt as you lift up.  Keep hands on front of hips and keep hips level.  Exercise Name: Balance Single Leg Stance Supported and Unsupported, Sets: 1 - Reps: marching in place 30-60 seconds  Exercise Name: Hip AROM Standing Abduction, Sets: 1 - Reps: 10-30x 1-2x/day    A:   No change of symptoms has been noted.  Response to therapy has shown lack of progress in  ROM , strength, edema, balance, proprioception and gait  Patient was last seen on 12/2/19, and she wished to update and review what she could do for her knee HEP.  This was accomplished today with our virtual video/phone visit.    P: Patient will continue with the exercise program assigned on their PTRx code and will add the following measures to manage their pain/condition: added seated assisted knee flexion ROM, added seated LAQ, and discontinue supine SLR flexion, and discontinue knee bend/squats at the sink for the week.     Continue current treatment plan until patient demonstrates readiness to progress to higher level exercises.      Virtual visit contact time    Time of service began: 10:45 AM  Time of service ended: 11:15 AM  Total Time for set up, visit, and documentation: 38 minutes    Payor: [facility] / Plan: [facility] MEDICARE ADVANTAGE / Product Type: Medicare /   .  Procedure Code/s   Therapeutic Exercise (04815): 30 minutes    I have reviewed the note as documented above.  This accurately captures the substance of my conversation with the  patient.  Provider location: [facility] ([place])  Patient location: patients home    ___________________________________________________    Any subjective info about the quality of the visit, ease of use: patient felt good about the appointment and plan to make another next week same time.    Patient's opinion about reasonable cost for this visit NA

## 2020-04-09 ENCOUNTER — VIRTUAL VISIT (OUTPATIENT)
Dept: PHYSICAL THERAPY | Facility: CLINIC | Age: 70
End: 2020-04-09
Payer: COMMERCIAL

## 2020-04-09 DIAGNOSIS — M25.562 CHRONIC PAIN OF LEFT KNEE: Primary | ICD-10-CM

## 2020-04-09 DIAGNOSIS — M25.562 ACUTE PAIN OF LEFT KNEE: ICD-10-CM

## 2020-04-09 DIAGNOSIS — G89.29 CHRONIC PAIN OF LEFT KNEE: Primary | ICD-10-CM

## 2020-04-09 PROCEDURE — 97110 THERAPEUTIC EXERCISES: CPT | Mod: 95 | Performed by: PHYSICAL THERAPIST

## 2020-04-09 NOTE — PROGRESS NOTES
"Physical Therapy Virtual Follow Up Visit      The patient has been notified of following:     \"This virtual visit will be conducted between you and your provider. We have found that certain health care needs can be provided without the need for physical presence.  This service lets us provide the care you need with a virtual visit.\"    Due to external, as well as internal [facility] management of the COVID-19 Virus, Angeli Jacobson was not seen in our clinic.  As a substitution, we implemented a virtual visit to manage this patient's condition utilizing the [employer] virtual visit platform via the patient s existing code.  The provider, Billy Mann, reviewed the patient's chart, PTRx prescription, and spoke with the patient to determine the following telemedicine visit is appropriate and effective for the patient's care.    The following type of visit was completed:   Video Visit:  The [employer] platform uses a synchronous HIPAA compliant video stream for this patient encounter.        S: Angeli Jacobson is a 70 year old female. Connected virtually on the PTRx platform to discuss their condition/progress. They noted improvements in better ROM, less knee stiffness after she does her exercises.  She also reports feeling a little stronger.  They noted ongoing pain or limitations with using only her R leg with stairs, and doesn't feel strong enough with using her L knee.  Pt also reports walking some, but not her usual distance (1/4 of her usual distance).  Pt uses her lymphedema machine in the morning for 1 hr, and then her leg exercises and this seems to be helping so far.  Pt does have a treadmill in home, and on the days she is not as busy will try using the treadmill a little (5-8 minutes).    O: Patient demonstrated good understanding of prior HEP, she is able to reach full TKE with seated LAQ ex, she will progress her use of L LE with steps as able.     PTRx Content from today's visit:  Exercise " "Name: Active Assisted Knee Flexion  Exercise Name: Seated Quad Set in Chair - Reps: 10-30x 1-2x/day, Notes: can do ball squeeze with both legs  Exercise Name: Short Arc Knee Extension  Exercise Name: Supine Heel Slides, Sets: 1 - Reps: 10-20x 2-3x/day, Notes: can use sheet to help pull back  Exercise Name: Isometric Quad, Sets: 1 - Reps: 10-30x 2-3x/day hold 5 seconds  Exercise Name: Bridging #1, Sets: 1 - Reps: up to 30 - Sessions: 1, Notes: Focus on squeezing your butt as you lift up.  Keep hands on front of hips and keep hips level.  Exercise Name: Balance Single Leg Stance Supported and Unsupported, Sets: 1 - Reps: marching in place 30-60 seconds  Exercise Name: Hip AROM Standing Abduction, Sets: 1 - Reps: 10-30x 1-2x/day  Exercise Name: Forward Step, Sets: 2 - Reps: 10, Notes: stand near counter and slowly step up with L leg (4-5\" step)    A:   Patient's symptoms are resolving.  Response to therapy has shown an improvement in  pain level, ROM  and strength      P: Patient will continue with the exercise program assigned on their PTRx code and will add the following measures to manage their pain/condition: forward step up ex with her L LE, while standing near countertop for UE balance.     Current treatment program is being advanced to more complex exercises.    Plan for virtual appt in 2 weeks (same time/day in 2 weeks)      Virtual visit contact time    Time of service began: 1100 AM  Time of service ended: 1127 AM  Total Time for set up, visit, and documentation: 31 minutes    Payor: [facility] / Plan: [facility] MEDICARE ADVANTAGE / Product Type: Medicare /   .  Procedure Code/s   Therapeutic Exercise (13233): 27 minutes    I have reviewed the note as documented above.  This accurately captures the substance of my conversation with the patient.  [facility]  Patient location: patients Home    ___________________________________________________    Any subjective info about the quality of " the visit, ease of use: virtual appt was easy and everything worked well  Patient's opinion about reasonable cost for this visit NA

## 2020-04-23 ENCOUNTER — MYC MEDICAL ADVICE (OUTPATIENT)
Dept: FAMILY MEDICINE | Facility: CLINIC | Age: 70
End: 2020-04-23

## 2020-04-23 ENCOUNTER — NURSE TRIAGE (OUTPATIENT)
Dept: FAMILY MEDICINE | Facility: CLINIC | Age: 70
End: 2020-04-23

## 2020-04-23 ENCOUNTER — NURSE TRIAGE (OUTPATIENT)
Dept: NURSING | Facility: CLINIC | Age: 70
End: 2020-04-23

## 2020-04-23 ENCOUNTER — VIRTUAL VISIT (OUTPATIENT)
Dept: PHYSICAL THERAPY | Facility: CLINIC | Age: 70
End: 2020-04-23
Payer: COMMERCIAL

## 2020-04-23 DIAGNOSIS — M25.562 ACUTE PAIN OF LEFT KNEE: ICD-10-CM

## 2020-04-23 PROCEDURE — 97110 THERAPEUTIC EXERCISES: CPT | Mod: 95 | Performed by: PHYSICAL THERAPIST

## 2020-04-23 NOTE — PROGRESS NOTES
"Physical Therapy Virtual Follow Up Visit      The patient has been notified of following:     \"This virtual visit will be conducted between you and your provider. We have found that certain health care needs can be provided without the need for physical presence.  This service lets us provide the care you need with a virtual visit.\"    Due to external, as well as internal [facility] management of the COVID-19 Virus, Angeli Jacobson was not seen in our clinic.  As a substitution, we implemented a virtual visit to manage this patient's condition utilizing the [employer]TRx virtual visit platform via the patient s existing code.  The provider, Billy Mann, reviewed the patient's chart, PTRx prescription, and spoke with the patient to determine the following telemedicine visit is appropriate and effective for the patient's care.    The following type of visit was completed:   Video Visit:  The [email or web address] platform uses a synchronous HIPAA compliant video stream for this patient encounter.        S: Angeli Jacobson is a 70 year old female. Connected virtually on the PTRx platform to discuss their condition/progress. They noted improvements in better with knee ROM and strength, not 100% but feeling some positive changes.  They noted ongoing pain or limitations with overall she is not walking like she used to, mostly due to COVID-19.  She has a treadmill in her home but has not used it, before she would walk outside more than she currently is doing.  Patient reports going down stairs the last 4 steps is ok, going up steps is difficult.    O: Patient demonstrated good understanding of HEP and activity recommendations.  She has progressed with 4-5\" step up exercise over the past few weeks.     PTRx Content from today's visit:  Exercise Name: Active Assisted Knee Flexion  Exercise Name: Seated Quad Set in Chair - Reps: 10-30x 1-2x/day, Notes: can do ball squeeze with both legs  Exercise Name: Short Arc Knee " "Extension  Exercise Name: Supine Heel Slides, Sets: 1 - Reps: 10-20x 2-3x/day, Notes: can use sheet to help pull back  Exercise Name: Isometric Quad, Sets: 1 - Reps: 10-30x 2-3x/day hold 5 seconds  Exercise Name: Bridging #1, Sets: 1 - Reps: up to 30 - Sessions: 1, Notes: Focus on squeezing your butt as you lift up.  Keep hands on front of hips and keep hips level.  Exercise Name: Balance Single Leg Stance Supported and Unsupported, Sets: 1 - Reps: marching in place 30-60 seconds  Exercise Name: Hip AROM Standing Abduction, Sets: 1 - Reps: 10-30x 1-2x/day  Exercise Name: Forward Step, Sets: 2 - Reps: 10, Notes: stand near counter and slowly step up with L leg (4-5\" step)    A:   Patient's symptoms are resolving.  Patient is progressing well and is ready to decrease frequency of treatment in the clinic.  Patient is becoming more independent in home exercise program  Response to therapy has shown an improvement in  ROM  and strength      P: Patient will continue with the exercise program assigned on their PTRx code and will add the following measures to manage their pain/condition: progress with height of step exercise with a goal of eventually using a standard stair in home.  Pt reports she had some PT last year with myofascial release Rx's that really helped her leg, and she would be interested in having this again when clinics are open again.  Patient was instructed for this she might need a new PT referral from her MD to continue with her outpatient PT.    Continue current treatment plan until patient demonstrates readiness to progress to higher level exercises.      Virtual visit contact time    Time of service began: 11:10 AM  Time of service ended: 11:40 AM  Total Time for set up, visit, and documentation: 46 minutes    Payor: [facility] / Plan: [facility] MEDICARE ADVANTAGE / Product Type: Medicare /   .  Procedure Code/s   Therapeutic Exercise (27505): 30 minutes    I have reviewed the note as documented above.  This " accurately captures the substance of my conversation with the patient.  Provider location: [facility] ([facility])  Patient location: Patients home

## 2020-04-23 NOTE — TELEPHONE ENCOUNTER
Pt called Rn Triage and was transferred to scheduling to set up a phone visit, I tried to get her set up for a video visit but it was too confusing. Pt was wondering if she could come in for an OV, please advise. [phone]    Vale Hoff Patient Representative

## 2020-04-23 NOTE — TELEPHONE ENCOUNTER
"Pt calls, routed to , please advise plan, route to inform pt    S-(situation): noticed red area on sclera one hour ago, lower right eye    B-(background): noticed redness in lower right eye 1 hour ago, denies pain, swelling, discharge or visual problem, wears glasses, not aware of anything that may have triggered, pt on Plavix    A-(assessment): possible subconjunctival hemmorhage    R-(recommendations): routed to , please confirm if okay to observe or if needs to f/u with eye provider      Reason for Disposition    Bleeding on white of the eye    Additional Information    Negative: Chemical got in the eye    Negative: Piece of something got in the eye    Negative: Eye redness followed an eye injury    Negative: Yellow or green pus in the eyes    Negative: [1] Eyelid is swollen AND [2] no redness of white of eye (sclera)    Negative: Caused by pollen or other allergy OR the main symptom is itchy eyes    Negative: Red, widespread rash also present    Negative: Severe eye pain    Negative: [1] Eyelids are very swollen (shut or almost) AND [2] fever    Negative: [1] Eyelid (outer) is very red (or tender to touch) AND [2] fever    Negative: [1] Foreign body sensation (\"feels like something is in there\") AND [2] irrigation didn't help    Negative: Vomiting    Negative: [1] Recent eye surgery AND [2] increasing eye pain    Negative: Patient sounds very sick or weak to the triager    Negative: Blurred vision    Negative: [1] Eye pain AND [2] more than mild    Negative: Cloudy spot or sore seen on the cornea (clear part of the eye)    Negative: Eyelid is very swollen (shut or almost)    Negative: Eyelid is red and painful (or tender to touch)    Negative: Eye pain present > 24 hours    Negative: [1] Bleeding on white of the eye AND [2] taking Coumadin (warfarin) or other strong blood thinner, or known bleeding disorder (e.g., thrombocytopenia)    Answer Assessment - Initial Assessment Questions  1. LOCATION: Location: " "\"What's red, the eyeball or the outer eyelids?\" (Note: when callers say the eye is red, they usually mean the sclera is red)        Sclera red inner corner, right eye  2. REDNESS OF SCLERA: \"Is the redness in one or both eyes?\" \"When did the redness start?\"       Right eye  3. ONSET: \"When did the eye become red?\" (e.g., hours, days)       1:30 today  4. EYELIDS: \"Are the eyelids red or swollen?\" If so, ask: \"How much?\"       No  5. VISION: \"Is there any difficulty seeing clearly?\"       no  6. ITCHING: \"Does it feel itchy?\" If so ask: \"How bad is it\" (e.g., Scale 1-10; or mild, moderate, severe)      no  7. PAIN: \"Is there any pain? If so, ask: \"How bad is it?\" (e.g., Scale 1-10; or mild, moderate, severe)      no  8. CONTACT LENS: \"Do you wear contacts?\"      no  9. CAUSE: \"What do you think is causing the redness?\"      \"I have no idea\"  10. OTHER SYMPTOMS: \"Do you have any other symptoms?\" (e.g., fever, runny nose, cough, vomiting)        No    Protocols used: EYE - RED WITHOUT PUS-A-AH    Franci Hanson, RN, BSN  Message handled by Nurse Triage.    "

## 2020-04-23 NOTE — TELEPHONE ENCOUNTER
Pt called back, stated that she has talked to her eye doctor and no longer needs any appt with Sepideh Burris.    Vale Hoff Patient Representative

## 2020-04-23 NOTE — TELEPHONE ENCOUNTER
Bleeding in the right eye - white of the eye    No swelling of the eyelids    No vision issues - no blurred, no double    No itching    No pain    oncare is not an option.    No fever    No cough    No shortness of breath    COVID 19 Nurse Triage Plan/Patient Instructions    Please be aware that novel coronavirus (COVID-19) may be circulating in the community. If you develop symptoms such as fever, cough, or SOB or if you have concerns about the presence of another infection including coronavirus (COVID-19), please contact your health care provider or visit [email or web address].     Disposition/Instructions    Patient to have scheduled Telephone Visit with a provider. Follow System Ambulatory Workflow for COVID 19.     The clinic staff will assist you to schedule an appointment to complete the Telephone Visit with a provider during normal clinic hours.       Call Back If: Your symptoms worsen before you are able to complete your Telephone Visit with a provider.    Thank you for limiting contact with others, wearing a simple mask to cover your cough, practice good hand hygiene habits and accessing our virtual services where possible to limit the spread of this virus.    For more information about COVID19 and options for caring for yourself at home, please visit the CDC website at https://www.cdc.gov/coronavirus/2019-ncov/about/steps-when-sick.html  For more options for care at [facility], please visit our website at [email or web address]    For more information, please use the [place] Department of Health COVID-19 Website: https://[email or web address]/diseases/coronavirus/index.html  [place] Department of Health ([facility]) COVID-19 Hotlines (Interpreters available):      Health questions: Phone Number: [phone] or [phone] and Hours: 7 a.m. to 7 p.m.    Schools and  questions: Phone Number: [phone] or [phone] and Hours 7 a.m. to 7 p.m.      Jenni  "Sharmila RN      Reason for Disposition    [1] Bleeding on white of the eye AND [2] taking Coumadin (warfarin) or other strong blood thinner, or known bleeding disorder (e.g., thrombocytopenia)    Additional Information    Negative: Severe eye pain    Negative: [1] Eyelids are very swollen (shut or almost) AND [2] fever    Negative: [1] Eyelid (outer) is very red (or tender to touch) AND [2] fever    Negative: [1] Foreign body sensation (\"feels like something is in there\") AND [2] irrigation didn't help    Negative: Vomiting    Negative: [1] Recent eye surgery AND [2] increasing eye pain    Negative: Patient sounds very sick or weak to the triager    Negative: Blurred vision    Negative: [1] Eye pain AND [2] more than mild    Negative: Cloudy spot or sore seen on the cornea (clear part of the eye)    Negative: Eyelid is very swollen (shut or almost)    Negative: Eyelid is red and painful (or tender to touch)    Negative: Eye pain present > 24 hours    Protocols used: EYE - RED WITHOUT PUS-A-AH      "

## 2020-04-24 ENCOUNTER — MYC MEDICAL ADVICE (OUTPATIENT)
Dept: FAMILY MEDICINE | Facility: CLINIC | Age: 70
End: 2020-04-24

## 2020-05-12 ENCOUNTER — VIRTUAL VISIT (OUTPATIENT)
Dept: FAMILY MEDICINE | Facility: CLINIC | Age: 70
End: 2020-05-12
Payer: COMMERCIAL

## 2020-05-12 DIAGNOSIS — E78.5 HYPERLIPIDEMIA LDL GOAL <70: ICD-10-CM

## 2020-05-12 DIAGNOSIS — R30.0 DYSURIA: Primary | ICD-10-CM

## 2020-05-12 PROCEDURE — 99213 OFFICE O/P EST LOW 20 MIN: CPT | Mod: 95 | Performed by: PHYSICIAN ASSISTANT

## 2020-05-12 RX ORDER — SULFAMETHOXAZOLE/TRIMETHOPRIM 800-160 MG
1 TABLET ORAL 2 TIMES DAILY
Qty: 10 TABLET | Refills: 0 | Status: SHIPPED | OUTPATIENT
Start: 2020-05-12 | End: 2020-05-20

## 2020-05-12 ASSESSMENT — PATIENT HEALTH QUESTIONNAIRE - PHQ9
5. POOR APPETITE OR OVEREATING: NOT AT ALL
SUM OF ALL RESPONSES TO PHQ QUESTIONS 1-9: 0

## 2020-05-12 ASSESSMENT — ANXIETY QUESTIONNAIRES
7. FEELING AFRAID AS IF SOMETHING AWFUL MIGHT HAPPEN: NOT AT ALL
6. BECOMING EASILY ANNOYED OR IRRITABLE: NOT AT ALL
3. WORRYING TOO MUCH ABOUT DIFFERENT THINGS: NOT AT ALL
5. BEING SO RESTLESS THAT IT IS HARD TO SIT STILL: NOT AT ALL
IF YOU CHECKED OFF ANY PROBLEMS ON THIS QUESTIONNAIRE, HOW DIFFICULT HAVE THESE PROBLEMS MADE IT FOR YOU TO DO YOUR WORK, TAKE CARE OF THINGS AT HOME, OR GET ALONG WITH OTHER PEOPLE: NOT DIFFICULT AT ALL
1. FEELING NERVOUS, ANXIOUS, OR ON EDGE: NOT AT ALL
GAD7 TOTAL SCORE: 0
2. NOT BEING ABLE TO STOP OR CONTROL WORRYING: NOT AT ALL

## 2020-05-12 NOTE — PROGRESS NOTES
"Angeli Jacobson is a 70 year old female who is being evaluated via a billable video visit.      The patient has been notified of following:     \"This video visit will be conducted via a call between you and your physician/provider. We have found that certain health care needs can be provided without the need for an in-person physical exam.  This service lets us provide the care you need with a video conversation.  If a prescription is necessary we can send it directly to your pharmacy.  If lab work is needed we can place an order for that and you can then stop by our lab to have the test done at a later time.    Video visits are billed at different rates depending on your insurance coverage.  Please reach out to your insurance provider with any questions.    If during the course of the call the physician/provider feels a video visit is not appropriate, you will not be charged for this service.\"    Patient has given verbal consent for Video visit? Yes    How would you like to obtain your AVS? Nica    Patient would like the video invitation sent by: [email or web address]    Will anyone else be joining your video visit? no        Subjective     Angeli Jacobson is a 70 year old female who presents today via video visit for the following health issues:    HPI  URINARY TRACT SYMPTOMS  Onset: Sunday     Description:   Painful urination (Dysuria): YES           Frequency: YES  Blood in urine (Hematuria): no  Delay in urine (Hesitency): no     Intensity: severe    Progression of Symptoms:  worsening    Accompanying Signs & Symptoms:  Fever/chills: no   Flank pain no   Nausea and vomiting: no   Any vaginal symptoms: none  Abdominal/Pelvic Pain: no     History:   History of frequent UTI's: YES  History of kidney stones: no   Sexually Active: yes   Possibility of pregnancy: No    Precipitating factors:   The pressure makes it worst.    Therapies Tried and outcome: nothing       Due for fasting labs.      Video Start " Time:     Hyperlipidemia Follow-Up      Are you regularly taking any medication or supplement to lower your cholesterol?   pravastatin    Are you having muscle aches or other side effects that you think could be caused by your cholesterol lowering medication?  No      Patient Active Problem List   Diagnosis     Pain in joint, multiple sites     MULTINODUL GOITER     Hearing loss     Hyperlipidemia LDL goal <70     Osteoporosis     Cervicalgia     Major depressive disorder, recurrent episode, moderate (H)     Impaired fasting glucose     Lymphedema of left leg     Tubular adenoma     Other constipation     Vertigo     Myxoid liposarcoma (HCC)     PAD (peripheral artery disease) (H)     Vascular graft occlusion (H)     Femoral-popliteal bypass graft occlusion, left (H)     History of sarcoma     S/P ORIF (open reduction internal fixation) fracture     Hip pain, left     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)     Urge incontinence of urine     Postural urinary incontinence     Personal history of urinary tract infection     OAB (overactive bladder)     Myalgia of pelvic floor     Pelvic floor dysfunction     Lesion of bladder     S/P total hip arthroplasty     Acute pain of left knee     Past Surgical History:   Procedure Laterality Date     ARTHROPLASTY HIP Left 10/4/2019    Procedure: Removal of left femoral hardware with a conversion to left total hip arthroplasty using a Biomet Annalisa femoral stem with an OsseoTi acetabular shell and a dual mobility bearing surface;  Surgeon: Spencer Celeste MD;  Location: [facility] OR     BYPASS GRAFT FEMOROPOPLITEAL Left 1/21/2019    Procedure: LEFT FEMORAL TO ABOVE KNEE POPLITEAL  BYPASS WITH POLYTETRAFLUOROETHYLENE GRAFT;  Surgeon: Shade Owens MD;  Location: [facility] OR     C APPENDECTOMY      Appendectomy     C NONSPECIFIC PROCEDURE  04/2000    Open Biopsy Left Thigh Liposarcoma     COLONOSCOPY N/A 2/5/2016    Procedure: COMBINED COLONOSCOPY, SINGLE OR MULTIPLE  "BIOPSY/POLYPECTOMY BY BIOPSY;  Surgeon: Varun Stanley MD, MD;  Location: [facility] GI     CYSTOSCOPY       EXCISION MALIG LESION>1.25CM  2000    Myxoid Liposarcoma       EXPLORE GROIN Right 2018    Procedure: EXPLORE GROIN;  EMERGENCY RIGHT FEMORAL EXPLORATION WITH FEMORAL ARTERY REPAIR.    EBL: 50mL;  Surgeon: Shade Owens MD;  Location: [facility] OR     HC COLONOSCOPY THRU STOMA, DIAGNOSTIC      due      HC COLP CERVIX/UPPER VAGINA  1997    Negative     HC DILATION/CURETTAGE DIAG/THER NON OB  1997    Post menopausal bleeding on HRT, negative     HIP SURGERY Left 2019     IR ANGIOGRAM THROUGH CATHETER FOLLOW UP  2018     IR ANGIOGRAM THROUGH CATHETER FOLLOW UP  2018     IR LOWER EXTREMITY ANGIOGRAM LEFT  2018     VASCULAR SURGERY  2018    Left SFA stent in bypass graft       Social History     Tobacco Use     Smoking status: Never Smoker     Smokeless tobacco: Never Used   Substance Use Topics     Alcohol use: No     Frequency: Monthly or less     Drinks per session: 1 or 2     Binge frequency: Never     Family History   Problem Relation Age of Onset     C.A.D. Father         MI 57     Alcohol/Drug Father         etoh     Obesity Mother      Osteoporosis Mother      Colon Cancer Brother 70     Hyperlipidemia Son      Hyperlipidemia Son         very high, experimental drug     C.A.D. Paternal Grandmother         ascvd     Diabetes Maternal Grandmother      Cancer Maternal Grandmother      C.A.D. Paternal Uncle         Mi  age 48     Cancer Maternal Aunt         pancreatic CA           Reviewed and updated as needed this visit by Provider         Review of Systems   Constitutional, HEENT, cardiovascular, pulmonary, gi and gu systems are negative, except as otherwise noted.      Objective    LMP 2005   Estimated body mass index is 24.21 kg/m  as calculated from the following:    Height as of 20: 1.676 m (5' 6\").    Weight as of 20: 68 kg (150 " lb).  Physical Exam     GENERAL: Healthy, alert and no distress  RESP: No audible wheeze, cough, able to speak in full sentences  PSYCH: Maffect normal/bright, judgement and insight intact, normal speech             Assessment & Plan     1. Dysuria  Push fluids and rest if symptoms don't improve in 48 hours, please call clinic for face to face visit.  The patient indicates understanding of these issues and agrees with the plan.    - sulfamethoxazole-trimethoprim (BACTRIM DS) 800-160 MG tablet; Take 1 tablet by mouth 2 times daily for 5 days  Dispense: 10 tablet; Refill: 0    2. Hyperlipidemia LDL goal <70  Await labs.   - Lipid panel reflex to direct LDL Fasting; Future     Converted to phone visit. 12 minutes spent on phone with pt.       Return in about 4 weeks (around 6/9/2020) for Lab Work.    Sepideh Burris PA-C  [facility]        Return in about 4 weeks (around 6/9/2020) for Lab Work.       Sepideh Burris PA-C    This visit was performed as a phone/virtual visit in order to mitigate transmission of COVID-19 virus during the epidemic time.  Some of the medical decisions made were due to limitations of phone/virtual visit (lack of physical exam, inability to perform lab tests/imaging and so forth)

## 2020-05-13 ASSESSMENT — ANXIETY QUESTIONNAIRES: GAD7 TOTAL SCORE: 0

## 2020-05-15 ENCOUNTER — TELEPHONE (OUTPATIENT)
Dept: FAMILY MEDICINE | Facility: CLINIC | Age: 70
End: 2020-05-15

## 2020-05-15 NOTE — TELEPHONE ENCOUNTER
Pt has been taking OTC pyridium for 2 days, has been off pyridium almost 24 hours.   Start sulfa on 5/12/20.      Pt is still having urgency, but better than when this all started.   Overall feeling better    Denies burning with urination, no abdominal, back or flank pain.  No fever.     Advised to continue with antibiotic, stay off pyridium.  Continue to push fluids.  Could try adding cranberry juice.   If needed be seen in UC over the weekend.  Call back next week if still having issues.     Pt expressed understanding and acceptance of the plan. had no further questions at this time.  Advised can call back to clinic at any time with concerns.       Sandra Allen, RN

## 2020-05-20 ENCOUNTER — VIRTUAL VISIT (OUTPATIENT)
Dept: OTHER | Facility: CLINIC | Age: 70
End: 2020-05-20
Attending: INTERNAL MEDICINE
Payer: COMMERCIAL

## 2020-05-20 DIAGNOSIS — Z95.828 HISTORY OF ARTERIAL BYPASS OF LOWER EXTREMITY: ICD-10-CM

## 2020-05-20 DIAGNOSIS — I73.9 PAD (PERIPHERAL ARTERY DISEASE) (H): ICD-10-CM

## 2020-05-20 DIAGNOSIS — C49.9 MYXOID LIPOSARCOMA (H): ICD-10-CM

## 2020-05-20 DIAGNOSIS — I89.0 LYMPHEDEMA OF LEFT LEG: Primary | ICD-10-CM

## 2020-05-20 DIAGNOSIS — E78.5 HYPERLIPIDEMIA LDL GOAL <70: ICD-10-CM

## 2020-05-20 DIAGNOSIS — E03.9 HYPOTHYROIDISM, UNSPECIFIED TYPE: ICD-10-CM

## 2020-05-20 PROCEDURE — 99214 OFFICE O/P EST MOD 30 MIN: CPT | Mod: 95 | Performed by: INTERNAL MEDICINE

## 2020-05-20 RX ORDER — CLOPIDOGREL BISULFATE 75 MG/1
75 TABLET ORAL DAILY
Qty: 90 TABLET | Refills: 3 | Status: SHIPPED | OUTPATIENT
Start: 2020-05-20 | End: 2021-02-16

## 2020-05-20 RX ORDER — EZETIMIBE 10 MG/1
10 TABLET ORAL DAILY
Qty: 90 TABLET | Refills: 3 | Status: SHIPPED | OUTPATIENT
Start: 2020-05-20 | End: 2021-02-16

## 2020-05-20 NOTE — PROGRESS NOTES
"Angeli Jacobson is a 70 year old female who is being evaluated via a billable video visit.      The patient has been notified of following:     \"This video visit will be conducted via a call between you and your physician/provider. We have found that certain health care needs can be provided without the need for an in-person physical exam.  This service lets us provide the care you need with a video conversation.  If a prescription is necessary we can send it directly to your pharmacy.  If lab work is needed we can place an order for that and you can then stop by our lab to have the test done at a later time.    Video visits are billed at different rates depending on your insurance coverage.  Please reach out to your insurance provider with any questions.    If during the course of the call the physician/provider feels a video visit is not appropriate, you will not be charged for this service.\"    Patient has given verbal consent for Video visit? Yes    How would you like to obtain your AVS? Nica    Patient would like the video invitation sent by: Text to cell phone: [phone] cell phone    Will anyone else be joining your video visit? No       Patient does not take vitals at home    Provider visit note:    Chief complaint:  Follow-up visit   known history of lymphedema  History of left lower extremity arterial bypass for PAD  She broke her left hip in April 2019 while in [place] underwent ORIF followed by left total hip replacement on October 4, 2019 at [facility].  Taking baby aspirin and Plavix  Changed Crestor to pravastatin ??  20 mg daily and tolerating planning for lipid panel next month  Back spasms better with periodic Flexeril        History of present illness:    Angeli Jacobson is a 70 year old very pleasant female with past medical history of left thigh/groin sarcoma underwent surgery with lymphadenectomy and radiation therapy in the year 2000 at [facility]" [place] followed by she developed peripheral arterial disease underwent  Redo left common femoral to a bony popliteal artery bypass with 6 mm PTFE graft in January 2019 and since then reasonably doing well as for as the PAD concerned visited [place] in April and she fell down broke her left hip underwent ORIF over there.   She has a long-standing history of lymphedema after sarcoma surgery and radiation therapy.  Using compression stockings etc..  She was also evaluated extensively at lymphedema clinic and currently using pump 1 hour a day  On October 4, 2019 she underwent left total hip replacement .  Postoperatively she was having back spasms due to positional discomfort and treated with Flexeril and they are better now.  She has been taking both Plavix and aspirin.    November 2019 left lower extremity arterial duplex and ADRIAN Dopplers were good    She is able to walk approximately 10 blocks daily    Reviewed recent imaging studies, laboratory data in the epic       Review of systems: Reviewed all 12 point review of systems as per HPI otherwise unremarkable    Physical exam:( Limited visual physical exam done this is virtual visit)    Reviewed recent laboratory tests, imaging studies in the epic and updated chart    Assessment and plan:    1. Lymphedema of left leg    Continue compression stockings use pump therapy elevate the leg  - Follow-Up with Vascular Medicine    2. History of arterial bypass of Left lower extremity  Currently taking dual antiplatelet agent baby aspirin with the Plavix tolerating without any problems and she is having issues with the statins and recently switched from Crestor to pravastatin and taking 20 mg daily scheduled to undergo lipid panel next month and this can be increased to 40 mg daily.  She is able to walk 10 blocks continue to walk as much as she can and follow the diet and exercise plan  - Follow-Up with Vascular Medicine    Will get ADRIAN with exercise or TBI in September  and also left lower extremity arterial duplex same day then visit with me in the office.    3. 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    - Follow-Up with Vascular Medicine    4. Hyperlipidemia LDL goal <70  As delineated in HPI currently on Zetia 10 mg daily and also pravastatin 20 mg daily tolerating go for lipid panel then increase the dose of pravastatin to 40 mg daily  - Follow-Up with Vascular Medicine  - ezetimibe (ZETIA) 10 MG tablet; Take 1 tablet (10 mg) by mouth daily  Dispense: 90 tablet; Refill: 3    5. Hypothyroidism, unspecified type  Clinically euthyroid and recent thyroid function tests are normal continue the same and take medication as advised  - Follow-Up with Vascular Medicine      This visit is being conducted as a virtual visit due to the emphasis on mitigation of the COVID-19 virus pandemic. The clinician has decided that the risk of an in-office visit outweighs the benefit for this patient.       Video-Visit Details    Type of service:  Video Visit    Video Start Time: 2:32 PM  Video End Time: 3:00 PM    Originating Location (pt. Location):Home    Distant Location (provider location):  [facility]    (Provider residence equipped with epic, voice recognition system and UserVoice)    Platform used for Video Visit: Ryan Prince MD, JASON, [facility]  Vascular medicine

## 2020-05-20 NOTE — PATIENT INSTRUCTIONS
1.  Please go for fasting lipid profile as planned by your primary care provider next month and consider increasing the dose of pravastatin to 40 mg daily    2.  Use compression stockings, elevate the legs whenever possible and use pump therapy    3.  Walk as much as you can    4.  Will plan to get ADRIAN with exercise or TBI along with left lower extremity arterial duplex in September 2020 then  see me in the clinic on the same day    5.  Zetia and Plavix refilled today    6.  Continue current medications

## 2020-05-23 ENCOUNTER — HOSPITAL ENCOUNTER (EMERGENCY)
Facility: CLINIC | Age: 70
Discharge: HOME | End: 2020-05-23
Attending: NURSE PRACTITIONER | Admitting: NURSE PRACTITIONER
Payer: COMMERCIAL

## 2020-05-23 ENCOUNTER — NURSE TRIAGE (OUTPATIENT)
Dept: NURSING | Facility: CLINIC | Age: 70
End: 2020-05-23

## 2020-05-23 VITALS
TEMPERATURE: 97.7 F | SYSTOLIC BLOOD PRESSURE: 159 MMHG | RESPIRATION RATE: 16 BRPM | DIASTOLIC BLOOD PRESSURE: 90 MMHG | OXYGEN SATURATION: 97 %

## 2020-05-23 DIAGNOSIS — R04.0 EPISTAXIS: ICD-10-CM

## 2020-05-23 PROCEDURE — 25000125 ZZHC RX 250: Performed by: NURSE PRACTITIONER

## 2020-05-23 PROCEDURE — 30901 CONTROL OF NOSEBLEED: CPT

## 2020-05-23 PROCEDURE — 99284 EMERGENCY DEPT VISIT MOD MDM: CPT

## 2020-05-23 RX ORDER — TRANEXAMIC ACID 100 MG/ML
500 INJECTION, SOLUTION INTRAVENOUS ONCE
Status: COMPLETED | OUTPATIENT
Start: 2020-05-23 | End: 2020-05-23

## 2020-05-23 RX ADMIN — TRANEXAMIC ACID 500 MG: 1 INJECTION, SOLUTION INTRAVENOUS at 19:13

## 2020-05-23 ASSESSMENT — ENCOUNTER SYMPTOMS
SHORTNESS OF BREATH: 0
COUGH: 0
FEVER: 0

## 2020-05-23 NOTE — ED PROVIDER NOTES
"History     Chief Complaint:  Epistaxis    HPI  Angeli Jacobson is a 70 year old female on Plavix with a history of nasal surgery who presents to the emergency department for evaluation of epistaxis in her left nare. She had a nose bleed last night which had resolved.  Today she was trying to remove \"crusty area\" from her nose when rebleeding started. Currently this is controled with a nose clamp.        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 in her father  Colon Cancer (age of onset: 70) in her brother  Hyperlipidemia in her son   Obesity in her mother  Osteoporosis in her mother    Social History:  The patient arrives alone  Smoking: never  Alcohol use: no  PCP: Sepideh Burris  Marital Status:  [2]      Review of Systems   Constitutional: Negative for fever.   HENT: Positive for nosebleeds.    Respiratory: Negative for cough and shortness of breath.    All other systems reviewed and are negative.      Physical Exam     Patient Vitals for the past " 24 hrs:   BP Temp Temp src Heart Rate Resp SpO2   05/23/20 1916 -- -- -- -- 16 --   05/23/20 1915 -- -- -- -- -- 97 %   05/23/20 1745 (!) 159/90 97.7  F (36.5  C) Oral 75 18 97 %     Physical Exam  General: Alert, No obvious discomfort, well kept   HENT:  Normal voice, No lymphadenopathy, mild bleeding from left nare appears to be from Kiesselbach's  plexus area.  Easily controlled with pressure.  Eyes:  The pupils are equal, round, and reactive to light, Conjunctiva normal, No scleral icterus   Neck:  Normal range of motion  CV:  Normal Pulses  Resp:  Non-labored, No cough  MS:  Normal muscular tone, moves all extremities  Skin:  No rash or acute skin lesions noted  Neuro:  Speech is normal and fluent  Psych: Awake. Alert.  Normal affect.  Appropriate interactions. Good eye contact        Emergency Department Course     Procedures  Epistaxis:  Left nare was packed with a 2 x 2 gauze soaked in TXA.  This was left in place for over 20 minutes.  It was then removed bleeding appeared to be stopped.  Patient was then observed for another greater than 20 minutes without rebleeding.    Interventions:  1913 tranexamic acid 500 mg left nare    Emergency Department Course:  Past medical records, nursing notes, and vitals reviewed.  1751: I performed an exam of the patient and obtained history, as documented above.     1914: I rechecked the patient. Explained findings to patient.    Findings and plan explained to the Patient. Patient discharged home with instructions regarding supportive care, medications, and reasons to return. The importance of close follow-up was reviewed.   Impression & Plan      Medical Decision Making:  Angeli Jacobson is a 70 year old female who presents for evaluation of epistaxis. The bleeding was controlled with interventions in the ED and therefore supportive outpatient management is indicated. Close follow-up with ENT and/or primary care if needed in the next 1-2 days. There are no signs of  coagulopathy causing the bleeding or a general medical condition (thrombocytopenia, DIC, leukemia, etc) causing the bleeding today.  Patient is on Plavix and bleeding occurred from digital trauma today.  The left nare was packed with soaked gauze per the procedure note above.  The bleeding stopped and did not restart here in ED. Discussed with patient to use humidity and bacitracin in nares bid for the next week. Will return immediately if return of bleeding, notices strange busing or bleeding gums, or for other concerns    Diagnosis:    ICD-10-CM   1. Epistaxis  R04.0       Disposition:   discharged to home      Scribe Disclosure:  I, Moriah Menjivar, am serving as a scribe at 5:51 PM on 5/23/2020 to document services personally performed by Ned Amado APRN based on my observations and the provider's statements to me.    [facility] EMERGENCY DEPARTMENT     Ned Amado APRN CNP  05/23/20 1942

## 2020-05-23 NOTE — TELEPHONE ENCOUNTER
Nosebleed since noon.  Takes Plavix  I talked to Jenny at [facility] and gave her brief information that patient is coming in.    COVID 19 Nurse Triage Plan/Patient Instructions    Please be aware that novel coronavirus (COVID-19) may be circulating in the community. If you develop symptoms such as fever, cough, or SOB or if you have concerns about the presence of another infection including coronavirus (COVID-19), please contact your health care provider or visit [email or web address].     Disposition/Instructions    Patient to go to ED and follow protocol based instructions. Follow System Ambulatory Workflow for COVID 19.     Bring Your Own Device:  Please also bring your smart device(s) (smart phones, tablets, laptops) and their charging cables for your personal use and to communicate with your care team during your visit.    Thank you for limiting contact with others, wearing a simple mask to cover your cough, practice good hand hygiene habits and accessing our virtual services where possible to limit the spread of this virus.    For more information about COVID19 and options for caring for yourself at home, please visit the CDC website at https://www.cdc.gov/coronavirus/2019-ncov/about/steps-when-sick.html  For more options for care at [facility], please visit our website at [email or web address]    For more information, please use the [place] Department of Health COVID-19 Website: https://www.health.[facility].mn.us/diseases/coronavirus/index.html  [place] Department of Health ([facility]) COVID-19 Hotlines (Interpreters available):      Health questions: Phone Number: [phone] or [phone] and Hours: 7 a.m. to 7 p.m.    Schools and  questions: Phone Number: [phone] or [phone] and Hours 7 a.m. to 7 p.m.      Ailin WOLF RN [facility]

## 2020-05-23 NOTE — ED AVS SNAPSHOT
[facility] Emergency Department  [address]  [facility] [address]  Phone:  [phone]  Fax:  [phone]                                    Angeli Jacobson   MRN: [id number]    Department:  [facility] Emergency Department   Date of Visit:  5/23/2020           After Visit Summary Signature Page    I have received my discharge instructions, and my questions have been answered. I have discussed any challenges I see with this plan with the nurse or doctor.    ..........................................................................................................................................  Patient/Patient Representative Signature      ..........................................................................................................................................  Patient Representative Print Name and Relationship to Patient    ..................................................               ................................................  Date                                   Time    ..........................................................................................................................................  Reviewed by Signature/Title    ...................................................              ..............................................  Date                                               Time          22EPIC Rev 08/18

## 2020-05-23 NOTE — ED NOTES
Patient presents with nosebleed since noon today. Patient has history of nosebleeds, on Plavix. Bleeding controlled with nose clamp. ABCDS intact, alert and oriented x 4.

## 2020-05-24 NOTE — DISCHARGE INSTRUCTIONS
Apply bacitracin or Neosporin or other such triple antibiotic to your nare at least morning and prior to sleep.  Gently blow your nose.  Do not scratch the area.

## 2020-05-24 NOTE — ED NOTES
Dukes, CNP applied gauze-soaked with cycklokapron into left nostril. After removing clamp for 30-40 minutes, no further bleeding noted.

## 2020-05-26 ENCOUNTER — HOSPITAL ENCOUNTER (EMERGENCY)
Facility: CLINIC | Age: 70
Discharge: HOME | End: 2020-05-26
Attending: EMERGENCY MEDICINE | Admitting: EMERGENCY MEDICINE
Payer: COMMERCIAL

## 2020-05-26 VITALS
RESPIRATION RATE: 16 BRPM | HEART RATE: 70 BPM | OXYGEN SATURATION: 97 % | DIASTOLIC BLOOD PRESSURE: 91 MMHG | TEMPERATURE: 97.2 F | SYSTOLIC BLOOD PRESSURE: 131 MMHG

## 2020-05-26 DIAGNOSIS — R04.0 EPISTAXIS: ICD-10-CM

## 2020-05-26 PROCEDURE — 99283 EMERGENCY DEPT VISIT LOW MDM: CPT | Mod: 25

## 2020-05-26 PROCEDURE — 30901 CONTROL OF NOSEBLEED: CPT

## 2020-05-26 RX ORDER — TRANEXAMIC ACID 100 MG/ML
INJECTION, SOLUTION INTRAVENOUS
Status: DISCONTINUED
Start: 2020-05-26 | End: 2020-05-26 | Stop reason: HOSPADM

## 2020-05-26 ASSESSMENT — ENCOUNTER SYMPTOMS
SHORTNESS OF BREATH: 0
LIGHT-HEADEDNESS: 0

## 2020-05-26 NOTE — ED AVS SNAPSHOT
[facility] Emergency Department  [address]  [facility] [address]  Phone:  [phone]  Fax:  [phone]                                    Angeli Jacobson   MRN: [id number]    Department:  [facility] Emergency Department   Date of Visit:  5/26/2020           After Visit Summary Signature Page    I have received my discharge instructions, and my questions have been answered. I have discussed any challenges I see with this plan with the nurse or doctor.    ..........................................................................................................................................  Patient/Patient Representative Signature      ..........................................................................................................................................  Patient Representative Print Name and Relationship to Patient    ..................................................               ................................................  Date                                   Time    ..........................................................................................................................................  Reviewed by Signature/Title    ...................................................              ..............................................  Date                                               Time          22EPIC Rev 08/18

## 2020-05-26 NOTE — ED PROVIDER NOTES
History     Chief Complaint:  Epistaxis       HPI   Angeli Jacobson is a 70 year old female on plavix who presents with epistaxis. The patient developed a nose bleed from the left nare 3 days ago and was seen in the ER. She had TXA soaked gauzed inserted into the left nare which stopped the bleeding and discharged home. She states that she turned in bed this morning and maybe bumped nose which started bleeding from the left side again. She used a nasal clamp to try and stop the bleeding. She left the clamp on her nose for about 30 minutes but noticed continued bleeding after taking it off. She denies any light headedness, shortness of breath, or cold symptoms.     Allergies:  Celexa [Citalopram Hydrobromide]     Medications:    Fosemax  Aspirin  Plavix  Zetia  Synthroid  Macrobid  oxybutyn  Pravastatin     Past Medical History:    Hearing loss  Hyperlipidemia  Osteoporosis  Cervicalgia  Major depressive disorder, recurrent moderate episode  Lymphedema of left leg  Tubular adenoma  Vertigo  Myxoid liposarcoma   PAD  Vascular graft occlusion  Femoral-popliteal bypass graft occlusion, left (  Sarcoma  Embolism of artery of left lower extremity   Postural urinary incontinence  urinary tract infection  OAB   Pelvic floor dysfunction  Lesion of bladder  SBE prophylaxis  Central serous retinopathy  Depression  HLD  Liposarcoma  Nontoxic multinodular goiter  Osteoporosis   Lymphedema  Shingles      Past Surgical History:    Appendectomy   OB dilation and curettage   excision malig lesion>1.25cm   Explore groin right  vascular surgery   Bypass graft femoropopliteal left  hip surgery left  Arthroplasty hip left     Family History:    Alcohol/Drug in her father  C.A.D. in her father  Colon Cancer (age of onset: 70) in her brother  Hyperlipidemia in her son   Obesity in her mother  Osteoporosis in her mother     Social History:  The patient arrives alone  Smoking: never  Alcohol use: no  PCP: Sepideh Burris  Status:  [2]    Review of Systems   HENT: Positive for nosebleeds (left nare).    Respiratory: Negative for shortness of breath.    Neurological: Negative for light-headedness.   All other systems reviewed and are negative.      Physical Exam     Patient Vitals for the past 24 hrs:   BP Temp Pulse Resp SpO2   05/26/20 0734 (!) 131/91 97.2  F (36.2  C) 70 16 97 %        Physical Exam  General:  No respiratory distress    HENT: nasal clamp on, no bleeding from right nare, fresh clot without bleeding in left nare.     Cardiovascular: Good cap refill.    Respiratory: Breathing non labored.     Musculoskeletal: No tenderness. No bony deformity.     Skin: No rashes or petechiae     Neurologic: non focal      Psychiatric: Appropriate      Emergency Department Course   Procedures  Epistaxis:  Left nare was packed with a 2 x 2 gauze soaked in TXA.  This was left in place for over 20 minutes.  It was then removed bleeding appeared to be stopped.  Patient was then observed for another greater than 20 minutes without rebleeding.    Interventions:  TXA    Emergency Department Course:  Past medical records, nursing notes, and vitals reviewed.    0743 I performed an exam of the patient as documented above.     0825 Patient rechecked and has no active bleeding.     0836 TXA soaked gauze was inserted into the patient's left nare.     0853 Patient rechecked and there was no bleeding after removing the gauze.     0915 Patient rechecked and has no active bleeding.      Findings and plan explained to the Patient. Patient discharged home with instructions regarding supportive care, medications, and reasons to return. The importance of close follow-up was reviewed.    Impression & Plan     Medical Decision Making:  Angeli Jacobson is a 70 year old female who presents to the emergency department today with epistaxis from her left nare.  She denies any trauma or recent cold symptoms.  She does take Plavix and aspirin per the  chart I think likely is a high [place] antiplatelet agents are cause behind this.  The patient had previously been treated with TXA with improvement and said that she used a clamp at home and had prolonged bleeding today though several days from her previous episode.  I did give her TXA here control the bleeding and she was discharged home in good condition.  I did not feel packing was necessary.  Did not find enough bleeding had occurred that I would need to check a CBC or platelet count here and she was discharged home in good condition will use the clamp for approximately half hour to restart and return if any problems.      Discharge Diagnosis:    ICD-10-CM    1. Epistaxis  R04.0        Disposition:  The patient is discharged to home.    Scribe Disclosure:  I, Maykel Garza, am serving as a scribe at 7:43 AM on 5/26/2020 to document services personally performed by Ramses Zavala MD based on my observations and the provider's statements to me.      5/26/2020   Ramses Zavala MD Farnan, Christopher M, MD  05/26/20 7359

## 2020-05-26 NOTE — ED TRIAGE NOTES
Patient presents to the ED with a left sided nose bleed. On plavix. States had a bad nosebleed on Friday and was told that needed to be re-evaluated if nose began bleeding again. Started at 0620 this morning.

## 2020-05-28 ENCOUNTER — TRANSFERRED RECORDS (OUTPATIENT)
Dept: HEALTH INFORMATION MANAGEMENT | Facility: CLINIC | Age: 70
End: 2020-05-28

## 2020-06-08 ENCOUNTER — MYC MEDICAL ADVICE (OUTPATIENT)
Dept: FAMILY MEDICINE | Facility: CLINIC | Age: 70
End: 2020-06-08

## 2020-06-09 ENCOUNTER — MYC MEDICAL ADVICE (OUTPATIENT)
Dept: FAMILY MEDICINE | Facility: CLINIC | Age: 70
End: 2020-06-09

## 2020-06-09 ENCOUNTER — VIRTUAL VISIT (OUTPATIENT)
Dept: UROLOGY | Facility: CLINIC | Age: 70
End: 2020-06-09
Payer: COMMERCIAL

## 2020-06-09 VITALS — WEIGHT: 148 LBS | HEIGHT: 67 IN | BODY MASS INDEX: 23.23 KG/M2

## 2020-06-09 DIAGNOSIS — M62.89 PELVIC FLOOR DYSFUNCTION: ICD-10-CM

## 2020-06-09 DIAGNOSIS — N32.81 OAB (OVERACTIVE BLADDER): ICD-10-CM

## 2020-06-09 DIAGNOSIS — E03.8 SUBCLINICAL HYPOTHYROIDISM: ICD-10-CM

## 2020-06-09 DIAGNOSIS — E78.5 HYPERLIPIDEMIA LDL GOAL <70: ICD-10-CM

## 2020-06-09 DIAGNOSIS — R30.0 DYSURIA: ICD-10-CM

## 2020-06-09 DIAGNOSIS — N39.41 URGE INCONTINENCE OF URINE: Primary | ICD-10-CM

## 2020-06-09 LAB
CHOLEST SERPL-MCNC: 310 MG/DL
HDLC SERPL-MCNC: 57 MG/DL
LDLC SERPL CALC-MCNC: 226 MG/DL
NONHDLC SERPL-MCNC: 253 MG/DL
TRIGL SERPL-MCNC: 135 MG/DL
TSH SERPL DL<=0.005 MIU/L-ACNC: 3.37 MU/L (ref 0.4–4)

## 2020-06-09 PROCEDURE — 84443 ASSAY THYROID STIM HORMONE: CPT | Performed by: PHYSICIAN ASSISTANT

## 2020-06-09 PROCEDURE — 36415 COLL VENOUS BLD VENIPUNCTURE: CPT | Performed by: PHYSICIAN ASSISTANT

## 2020-06-09 PROCEDURE — 99212 OFFICE O/P EST SF 10 MIN: CPT | Mod: 95 | Performed by: UROLOGY

## 2020-06-09 PROCEDURE — 80061 LIPID PANEL: CPT | Performed by: PHYSICIAN ASSISTANT

## 2020-06-09 RX ORDER — TROSPIUM CHLORIDE ER 60 MG/1
60 CAPSULE ORAL EVERY MORNING
Qty: 30 CAPSULE | Refills: 3 | Status: SHIPPED | OUTPATIENT
Start: 2020-06-09 | End: 2020-09-22

## 2020-06-09 RX ORDER — PRAVASTATIN SODIUM 40 MG
40 TABLET ORAL DAILY
Qty: 90 TABLET | Refills: 1 | Status: SHIPPED | OUTPATIENT
Start: 2020-06-09 | End: 2020-09-22

## 2020-06-09 ASSESSMENT — PAIN SCALES - GENERAL: PAINLEVEL: MODERATE PAIN (4)

## 2020-06-09 ASSESSMENT — MIFFLIN-ST. JEOR: SCORE: 1216.01

## 2020-06-09 NOTE — LETTER
"6/9/2020       RE: Angeli Jacobson  [id number]  [facility] [address]     Dear Colleague,    Thank you for referring your patient, Angeli Jacobson, to the [facility] at [facility]. Please see a copy of my visit note below.    June 9, 2020    Angeli Jacobson is a 70 year old female who is being evaluated via a billable telephone visit.      The patient has been notified of following:     \"This telephone visit will be conducted via a call between you and your physician/provider. We have found that certain health care needs can be provided without the need for a physical exam.  This service lets us provide the care you need with a short phone conversation.  If a prescription is necessary we can send it directly to your pharmacy.  If lab work is needed we can place an order for that and you can then stop by our lab to have the test done at a later time.    Telephone visits are billed at different rates depending on your insurance coverage. During this emergency period, for some insurers they may be billed the same as an in-person visit.  Please reach out to your insurance provider with any questions.    If during the course of the call the physician/provider feels a telephone visit is not appropriate, you will not be charged for this service.\"    Patient has given verbal consent for Telephone visit?  Yes    What phone number would you like to be contacted at? [phone]    How would you like to obtain your AVS? Nica    June 9, 2020    Telephone visit    Patient has elected a telephone call for today's follow up.  Called patient today and verified her name and date of birth prior to discussion.    Today's telephone visit is to discuss her urinary symptoms.  She has not had any UTI since last visit.  Did a urine today when went to lab but not sure why it was done.  It looks like it was associated with a visit in " February    Still is taking the oxybutynin but not sure if it is helping.  She does not remember the discussion we had about cognitive side effects of oxybutynin.    Trying to continue her pelvic floor exercises when she remembers    Patient denies any changes to her past medical, surgical or social history.  Patient denies any changes to her medications or allergies    A/P:  Angeli Jacobson is a 70 year old F with OAB symptoms, urge incontinence, postural incontinence, and history of UTIs.    Continue pelvic floor therapy    Switch from oxybutynin to trospium to see if the medication works better and less cognitive side effects    Follow up in 3 months, sooner if needed    8 minutes were spent in patient care today    Jennifer Oquendo MD MPH   of Urology    CC  Patient Care Team:  Sepideh Burris PA-C as PCP - General (Physician Assistant)  Sepideh Burris PA-C as Assigned PCP

## 2020-06-09 NOTE — PROGRESS NOTES
"June 9, 2020    Angeli Jacobson is a 70 year old female who is being evaluated via a billable telephone visit.      The patient has been notified of following:     \"This telephone visit will be conducted via a call between you and your physician/provider. We have found that certain health care needs can be provided without the need for a physical exam.  This service lets us provide the care you need with a short phone conversation.  If a prescription is necessary we can send it directly to your pharmacy.  If lab work is needed we can place an order for that and you can then stop by our lab to have the test done at a later time.    Telephone visits are billed at different rates depending on your insurance coverage. During this emergency period, for some insurers they may be billed the same as an in-person visit.  Please reach out to your insurance provider with any questions.    If during the course of the call the physician/provider feels a telephone visit is not appropriate, you will not be charged for this service.\"    Patient has given verbal consent for Telephone visit?  Yes    What phone number would you like to be contacted at? [phone]    How would you like to obtain your AVS? Nica    June 9, 2020    Telephone visit    Patient has elected a telephone call for today's follow up.  Called patient today and verified her name and date of birth prior to discussion.    Today's telephone visit is to discuss her urinary symptoms.  She has not had any UTI since last visit.  Did a urine today when went to lab but not sure why it was done.  It looks like it was associated with a visit in February    Still is taking the oxybutynin but not sure if it is helping.  She does not remember the discussion we had about cognitive side effects of oxybutynin.    Trying to continue her pelvic floor exercises when she remembers    Patient denies any changes to her past medical, surgical or social history.  Patient denies " any changes to her medications or allergies    A/P:  Angeli Jacobson is a 70 year old F with OAB symptoms, urge incontinence, postural incontinence, and history of UTIs.    Continue pelvic floor therapy    Switch from oxybutynin to trospium to see if the medication works better and less cognitive side effects    Follow up in 3 months, sooner if needed    8 minutes were spent in patient care today    Jennifer Oquendo MD MPH   of Urology    CC  Patient Care Team:  Sepideh Burris PA-C as PCP - General (Physician Assistant)  Sepideh Burris PA-C as Assigned PCP

## 2020-06-09 NOTE — PATIENT INSTRUCTIONS
Stop the oxybutynin    Start the trospium    Continue you pelvic floor physical therapy exercises    Follow up with us in about 3 months, sooner if needed    It was a pleasure meeting with you today.  Thank you for allowing me and my team the privilege of caring for you today.  YOU are the reason we are here, and I truly hope we provided you with the excellent service you deserve.  Please let us know if there is anything else we can do for you so that we can be sure you are leaving completely satisfied with your care experience.

## 2020-06-16 PROBLEM — N39.41 URGE INCONTINENCE OF URINE: Status: RESOLVED | Noted: 2019-07-17 | Resolved: 2020-06-16

## 2020-06-16 PROBLEM — M62.89 PELVIC FLOOR DYSFUNCTION: Status: RESOLVED | Noted: 2019-09-11 | Resolved: 2020-06-16

## 2020-06-16 PROBLEM — M25.562 ACUTE PAIN OF LEFT KNEE: Status: RESOLVED | Noted: 2019-10-29 | Resolved: 2020-06-16

## 2020-06-19 ENCOUNTER — TRANSFERRED RECORDS (OUTPATIENT)
Dept: HEALTH INFORMATION MANAGEMENT | Facility: CLINIC | Age: 70
End: 2020-06-19

## 2020-06-25 DIAGNOSIS — E78.5 HYPERLIPIDEMIA LDL GOAL <70: ICD-10-CM

## 2020-06-25 RX ORDER — PRAVASTATIN SODIUM 20 MG
TABLET ORAL
Qty: 90 TABLET | Refills: 0 | OUTPATIENT
Start: 2020-06-25

## 2020-06-25 NOTE — TELEPHONE ENCOUNTER
Adjustment in therapy. Refusing refill request and closing encounter. Yanique Mahoney RN on 6/25/2020 at 9:08 AM

## 2020-07-17 DIAGNOSIS — N39.41 URGE INCONTINENCE: Primary | ICD-10-CM

## 2020-07-17 RX ORDER — TROSPIUM CHLORIDE ER 60 MG/1
60 CAPSULE ORAL EVERY MORNING
Qty: 30 EACH | Refills: 9 | Status: SHIPPED | OUTPATIENT
Start: 2020-07-17 | End: 2020-09-22

## 2020-07-27 ENCOUNTER — MYC MEDICAL ADVICE (OUTPATIENT)
Dept: FAMILY MEDICINE | Facility: CLINIC | Age: 70
End: 2020-07-27

## 2020-07-27 NOTE — PROGRESS NOTES
Subjective     Angeli Jacobson is a 70 year old female who presents to clinic today for the following health issues:    History of Present Illness        Back Pain:  She presents for follow up of back pain. Patient's back pain is a new problem.    Original cause of back pain: turning/bending  First noticed back pain: 1-4 weeks ago  Patient feels back pain: constantlyLocation of back pain:  Left lower back  Description of back pain: burning, fullness and sharp  Back pain spreads: nowhere    Since patient first noticed back pain, pain is: unchanged  Does back pain interfere with her job:  Not applicable  On a scale of 1-10 (10 being the worst), patient describes pain as:  6  What makes back pain worse: certain positions, twisting and other  Acetaminophen: helpful  Activity or exercise: not tried  Chiropractor:  Not tried  Cold: not tried  Heat: not tried  Massage: not tried  Muscle relaxants: not tried  NSAIDS: not tried  Opioids: not tried  Physical Therapy: not tried  Rest: not tried  Steroid Injection: not tried  Stretching: not tried  Surgery: not tried  TENS unit: not tried  Topical pain relievers: not tried  Other healthcare providers patient is seeing for back pain: None    She eats 4 or more servings of fruits and vegetables daily.She consumes 0 sweetened beverage(s) daily.She exercises with enough effort to increase her heart rate 9 or less minutes per day.  She exercises with enough effort to increase her heart rate 3 or less days per week.   She is taking medications regularly.           Answers for HPI/ROS submitted by the patient on 7/28/2020   If you checked off any problems, how difficult have these problems made it for you to do your work, take care of things at home, or get along with other people?: Not difficult at all  PHQ9 TOTAL SCORE: 0  CYNDI 7 TOTAL SCORE: 0    Slight tenderness over left lat dorsi, torsional movements aggravate   Feels she's getting better, no trauma     BP Readings from  Last 3 Encounters:   07/28/20 104/60   05/26/20 (!) 131/91   05/23/20 (!) 159/90    Wt Readings from Last 3 Encounters:   07/28/20 67.8 kg (149 lb 8 oz)   06/09/20 67.1 kg (148 lb)   02/11/20 68 kg (150 lb)                    Reviewed and updated as needed this visit by Provider         Review of Systems   Constitutional, HEENT, cardiovascular, pulmonary, GI, , musculoskeletal, neuro, skin, endocrine and psych systems are negative, except as otherwise noted.      Objective    Wt 67.8 kg (149 lb 8 oz)   LMP 03/18/2005   BMI 23.77 kg/m    Body mass index is 23.77 kg/m .  Physical Exam   GENERAL: healthy, alert and no distress  EYES: Eyes grossly normal to inspection, PERRL and conjunctivae and sclerae normal  HENT: ear canals and TM's normal, nose and mouth without ulcers or lesions  NECK: no adenopathy, no asymmetry, masses, or scars and thyroid normal to palpation  RESP: lungs clear to auscultation - no rales, rhonchi or wheezes  CV: regular rate and rhythm, normal S1 S2, no S3 or S4, no murmur, click or rub, no peripheral edema and peripheral pulses strong  MS: no gross musculoskeletal defects noted, no edema  SKIN: no suspicious lesions or rashes  NEURO: Normal strength and tone, mentation intact and speech normal  PSYCH: mentation appears normal, affect normal/bright    Diagnostic Test Results:  Labs reviewed in Epic  No bladder or bowel symptoms         Assessment & Plan     1. Strain of latissimus dorsi muscle, initial encounter  Heat and gentle stretches     2. Hip pain, left  Gradually better since her surgey     3. Age-related osteoporosis without current pathological fracture      4. 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 Major depressive disorder, recurrent episode, moderate (H)  Well adjusted and communicates well         Regular exercise    Return in about 4 weeks (around 8/25/2020), or if symptoms worsen or fail to improve.    Isaiah Bhatti,  "MD  [facility]      Pre-Visit Planning     Future Appointments   Date Time Provider Department Center   7/28/2020 10:10 AM Isaiah Bhatti MD [facility]     Arrival Time for this Appointment: 10:00 AM   Appointment Notes for this encounter:   lower back issues, pt states discomfort if she is careful moving, painful if she's not careful with her movement    Questionnaires Reviewed/Assigned  No additional questionnaires are needed     Patient preferred phone number: [phone]    Sent [employer] message. She responds, \"Regarding the medication list, I no longer take Oxibutinin and there should be only one Tropsium listed.   I do not have other concerns to be addressed during the visit.   I use the [facility] pharmacy located in the [facility].   I do not need refills on any of the medications.\"  Mindy Zhang, BSN, RN, PHN        "

## 2020-07-28 ENCOUNTER — OFFICE VISIT (OUTPATIENT)
Dept: FAMILY MEDICINE | Facility: CLINIC | Age: 70
End: 2020-07-28
Payer: COMMERCIAL

## 2020-07-28 VITALS
RESPIRATION RATE: 14 BRPM | WEIGHT: 149.5 LBS | BODY MASS INDEX: 23.77 KG/M2 | OXYGEN SATURATION: 97 % | TEMPERATURE: 98.3 F | HEART RATE: 74 BPM | DIASTOLIC BLOOD PRESSURE: 60 MMHG | SYSTOLIC BLOOD PRESSURE: 104 MMHG

## 2020-07-28 DIAGNOSIS — F33.1 MAJOR DEPRESSIVE DISORDER, RECURRENT EPISODE, MODERATE (H): ICD-10-CM

## 2020-07-28 DIAGNOSIS — M81.0 AGE-RELATED OSTEOPOROSIS WITHOUT CURRENT PATHOLOGICAL FRACTURE: ICD-10-CM

## 2020-07-28 DIAGNOSIS — I74.3: ICD-10-CM

## 2020-07-28 DIAGNOSIS — S29.012A STRAIN OF LATISSIMUS DORSI MUSCLE, INITIAL ENCOUNTER: Primary | ICD-10-CM

## 2020-07-28 DIAGNOSIS — M25.552 HIP PAIN, LEFT: ICD-10-CM

## 2020-07-28 PROCEDURE — 99214 OFFICE O/P EST MOD 30 MIN: CPT | Performed by: FAMILY MEDICINE

## 2020-07-28 ASSESSMENT — ANXIETY QUESTIONNAIRES
GAD7 TOTAL SCORE: 0
GAD7 TOTAL SCORE: 0
7. FEELING AFRAID AS IF SOMETHING AWFUL MIGHT HAPPEN: NOT AT ALL
4. TROUBLE RELAXING: NOT AT ALL
1. FEELING NERVOUS, ANXIOUS, OR ON EDGE: NOT AT ALL
6. BECOMING EASILY ANNOYED OR IRRITABLE: NOT AT ALL
GAD7 TOTAL SCORE: 0
5. BEING SO RESTLESS THAT IT IS HARD TO SIT STILL: NOT AT ALL
7. FEELING AFRAID AS IF SOMETHING AWFUL MIGHT HAPPEN: NOT AT ALL
3. WORRYING TOO MUCH ABOUT DIFFERENT THINGS: NOT AT ALL
2. NOT BEING ABLE TO STOP OR CONTROL WORRYING: NOT AT ALL

## 2020-07-28 ASSESSMENT — PATIENT HEALTH QUESTIONNAIRE - PHQ9
SUM OF ALL RESPONSES TO PHQ QUESTIONS 1-9: 0
10. IF YOU CHECKED OFF ANY PROBLEMS, HOW DIFFICULT HAVE THESE PROBLEMS MADE IT FOR YOU TO DO YOUR WORK, TAKE CARE OF THINGS AT HOME, OR GET ALONG WITH OTHER PEOPLE: NOT DIFFICULT AT ALL
SUM OF ALL RESPONSES TO PHQ QUESTIONS 1-9: 0

## 2020-07-29 ASSESSMENT — ANXIETY QUESTIONNAIRES: GAD7 TOTAL SCORE: 0

## 2020-07-29 ASSESSMENT — PATIENT HEALTH QUESTIONNAIRE - PHQ9: SUM OF ALL RESPONSES TO PHQ QUESTIONS 1-9: 0

## 2020-08-11 ENCOUNTER — TELEPHONE (OUTPATIENT)
Dept: OTHER | Facility: CLINIC | Age: 70
End: 2020-08-11

## 2020-08-26 ENCOUNTER — MYC MEDICAL ADVICE (OUTPATIENT)
Dept: FAMILY MEDICINE | Facility: CLINIC | Age: 70
End: 2020-08-26

## 2020-08-26 DIAGNOSIS — M62.838 MUSCLE SPASM: Primary | ICD-10-CM

## 2020-08-26 RX ORDER — CYCLOBENZAPRINE HCL 10 MG
10 TABLET ORAL 2 TIMES DAILY PRN
Qty: 30 TABLET | Refills: 1 | Status: SHIPPED | OUTPATIENT
Start: 2020-08-26 | End: 2020-08-27

## 2020-08-26 RX ORDER — CYCLOBENZAPRINE HCL 10 MG
TABLET ORAL
Refills: 1 | COMMUNITY
Start: 2019-10-10 | End: 2020-09-22

## 2020-08-27 ENCOUNTER — OFFICE VISIT (OUTPATIENT)
Dept: FAMILY MEDICINE | Facility: CLINIC | Age: 70
End: 2020-08-27
Payer: COMMERCIAL

## 2020-08-27 VITALS
OXYGEN SATURATION: 97 % | HEART RATE: 84 BPM | WEIGHT: 148 LBS | DIASTOLIC BLOOD PRESSURE: 64 MMHG | SYSTOLIC BLOOD PRESSURE: 112 MMHG | BODY MASS INDEX: 23.53 KG/M2 | TEMPERATURE: 98.3 F

## 2020-08-27 DIAGNOSIS — M62.838 MUSCLE SPASM: Primary | ICD-10-CM

## 2020-08-27 PROBLEM — K59.09 OTHER CONSTIPATION: Status: RESOLVED | Noted: 2017-06-27 | Resolved: 2020-08-27

## 2020-08-27 PROCEDURE — 99213 OFFICE O/P EST LOW 20 MIN: CPT | Performed by: PHYSICIAN ASSISTANT

## 2020-08-27 RX ORDER — CYCLOBENZAPRINE HCL 10 MG
10 TABLET ORAL 3 TIMES DAILY PRN
Qty: 60 TABLET | Refills: 1 | Status: SHIPPED | OUTPATIENT
Start: 2020-08-27 | End: 2020-09-22

## 2020-08-27 NOTE — PROGRESS NOTES
Pre-Visit Planning     Future Appointments   Date Time Provider Department Center   8/27/2020 10:50 AM Sepideh Burris PA-C [facility]   9/9/2020  8:50 AM Sepideh Burris PA-C [facility]   9/14/2020  1:00 PM [email or web address]   9/14/2020  1:40 PM [facility]   9/22/2020 11:30 AM [facility]     Arrival Time for this Appointment: 10:40 AM   Appointment Notes for this encounter:   pain behind ear  My chart yesterday for back pain given flexeril     Questionnaires Reviewed/Assigned  No additional questionnaires are needed  Last OV with provider  07/28/2020 Dr Bhatti for back pain    Hospital ER Visits  none  Specialty Visits  none    Imaging and Lab Review  none    Recent Procedures  none    Health Maintenance Due   Topic Date Due     ZOSTER IMMUNIZATION (2 of 3) 03/20/2015     ANNUAL REVIEW OF HM ORDERS  08/13/2020     MEDICARE ANNUAL WELLNESS VISIT  08/13/2020     INFLUENZA VACCINE (1) 09/01/2020     Current Outpatient Medications   Medication     Acetaminophen 325 MG CAPS     alendronate (FOSAMAX) 70 MG tablet     aspirin (ASA) 81 MG chewable tablet     azelastine (ASTELIN) 0.1 % nasal spray     calcium carbonate 600 mg-vitamin D 400 units (CALTRATE) 600-400 MG-UNIT per tablet     clopidogrel (PLAVIX) 75 MG tablet     cyclobenzaprine (FLEXERIL) 10 MG tablet     cyclobenzaprine (FLEXERIL) 10 MG tablet     ezetimibe (ZETIA) 10 MG tablet     levothyroxine (SYNTHROID/LEVOTHROID) 50 MCG tablet     multivitamin, therapeutic (THERA-VIT) TABS tablet     nitroFURantoin macrocrystal-monohydrate (MACROBID) 100 MG capsule     order for DME     ORDER FOR DME     oxybutynin ER (DITROPAN-XL) 5 MG 24 hr tablet     pravastatin (PRAVACHOL) 40 MG tablet     trospium (SANCTURA XR) 60 MG CP24 24 hr capsule     trospium (SANCTURA XR) 60 MG CP24 24 hr capsule     No current facility-administered medications for this visit.      Denies refills    My chart active?  yes    Patient preferred phone  number: [phone]  yes        Subjective     Angeli Jacobson is a 70 year old female who presents to clinic today for the following health issues:    HPI       Concern - skin behind right ear   Onset: 2 days  Description: sharp pains on area behind right area  Intensity: severe  Progression of Symptoms:  same  Accompanying Signs & Symptoms: none  Previous history of similar problem: yes - several years ago  Precipitating factors:        Worsened by: nothing  Alleviating factors:        Improved by: Tylenol - helps slightly  Therapies tried and outcome:tylenol      Review of Systems   Constitutional, HEENT, cardiovascular, pulmonary, gi and gu systems are negative, except as otherwise noted.      Objective    LMP 03/18/2005   There is no height or weight on file to calculate BMI.  Physical Exam   GENERAL APPEARANCE: healthy, alert and no distress  EYES: Eyes grossly normal to inspection, PERRL and conjunctivae and sclerae normal  HENT:  No tenderness over mastoid process, ear canals and TM's normal and nose and mouth without ulcers or lesions  NECK: no adenopathy and no asymmetry, masses, or scars  RESP: lungs clear to auscultation - no rales, rhonchi or wheezes  CV: regular rates and rhythm, normal S1 S2, no S3 or S4 and no murmur, click or rub  LYMPHATICS: no  adenopathy  MS: extremities normal- no gross deformities noted  Tight right levator, right trapezius, right scalene muscles, TTP  SKIN: no suspicious lesions or rashes  NEURO: Normal strength and tone, mentation intact and speech normal  PSYCH: mentation appears normal and affect normal/bright            Assessment & Plan     Muscle spasm  Ice, heat, rest, gentle stretches shown, Voltaren gel over the counter  Massage.   - cyclobenzaprine (FLEXERIL) 10 MG tablet; Take 1 tablet (10 mg) by mouth 3 times daily as needed for muscle spasms           No follow-ups on file.    Sepideh Burris PA-C  [facility]      Future Appointments    Date Time Provider Department [place]   8/27/2020 10:50 AM Sepideh Burris PA-C [facility]   9/9/2020  8:50 AM Sepideh Burris PA-C [facility]   9/14/2020  1:00 PM [email or web address]   9/14/2020  1:40 PM [facility]   9/22/2020 11:30 AM Tiesha Prince MD [facility]     Appointment Notes for this encounter:   pain behind ear    Health Maintenance Due   Topic Date Due     ZOSTER IMMUNIZATION (2 of 3) 03/20/2015     ANNUAL REVIEW OF HM ORDERS  08/13/2020     MEDICARE ANNUAL WELLNESS VISIT  08/13/2020     INFLUENZA VACCINE (1) 09/01/2020     Health Maintenance addressed:  Physical already scheduled for 9/9/20      Immunizations Pt declined - will call insurance for shingles    MyChart Status:  Active and Using

## 2020-08-31 ENCOUNTER — OFFICE VISIT (OUTPATIENT)
Dept: FAMILY MEDICINE | Facility: CLINIC | Age: 70
End: 2020-08-31
Payer: COMMERCIAL

## 2020-08-31 VITALS
DIASTOLIC BLOOD PRESSURE: 77 MMHG | OXYGEN SATURATION: 98 % | HEART RATE: 93 BPM | SYSTOLIC BLOOD PRESSURE: 119 MMHG | WEIGHT: 151 LBS | TEMPERATURE: 97.9 F | BODY MASS INDEX: 24.01 KG/M2

## 2020-08-31 DIAGNOSIS — M81.0 AGE-RELATED OSTEOPOROSIS WITHOUT CURRENT PATHOLOGICAL FRACTURE: ICD-10-CM

## 2020-08-31 DIAGNOSIS — S16.1XXD STRAIN OF NECK MUSCLE, SUBSEQUENT ENCOUNTER: Primary | ICD-10-CM

## 2020-08-31 DIAGNOSIS — S46.819D STRAIN OF TRAPEZIUS MUSCLE, UNSPECIFIED LATERALITY, SUBSEQUENT ENCOUNTER: ICD-10-CM

## 2020-08-31 PROCEDURE — 99213 OFFICE O/P EST LOW 20 MIN: CPT | Performed by: FAMILY MEDICINE

## 2020-08-31 NOTE — PATIENT INSTRUCTIONS
Patient Education     Neck Sprain or Strain  A sudden force that causes turning or bending of the neck can cause sprain or strain. An example would be the force from a car accident. This can stretch or tear muscles called a strain. It can also stretch or tear ligaments called a sprain. Either of these can cause neck pain. Sometimes neck pain occurs after a simple awkward movement. In either case, muscle spasm is commonly present and contributes to the pain.   Unless you had a forceful physical injury (for example, a car accident or fall), X-rays are often not ordered for the initial evaluation of neck pain. If pain continues and does not respond to medical treatment, X-rays and other tests may be done later.  Home care    You may feel more soreness and spasm the first few days after the injury. Rest until symptoms start to improve.    When lying down, use a comfortable pillow or a rolled towel that supports the head and keeps the spine in a neutral position. The position of the head should not be tilted forward or backward.    Apply an ice pack over the injured area for 15 to 20 minutes every 3 to 6 hours. Do this for the first 24 to 48 hours. You can make an ice pack by filling a plastic bag that seals at the top with ice cubes and then wrapping it with a thin towel. After 48 hours, apply heat (warm shower or warm bath) for 15 to 20 minutes several times a day, or alternate ice and heat.    You may use over-the-counter pain medicine to control pain, unless another pain medicine was prescribed. If you have chronic liver or kidney disease or ever had a stomach ulcer or gastrointestinal bleeding, talk with your healthcare provider before using these medicines.    If a soft cervical collar was prescribed, only ear it for periods of increased pain. It should not be worn for more than 3 hours a day, or for longer than 1 to 2 weeks.  Follow-up care  Follow up with your healthcare provider, or as directed. Physical  therapy may be needed.  Sometimes fractures don t show up on the first X-ray. Bruises and sprains can sometimes hurt as much as a fracture. These injuries can take time to heal completely. If your symptoms don t improve or they get worse, talk with your healthcare provider. You may need a repeat X-ray or other tests. If X-rays were taken, you will be told of any new findings that may affect your care.  Call 911  Call 911 if you have:    Neck swelling, difficulty or painful swallowing    Trouble breathing    Chest pain  When to seek medical advice  Call your healthcare provider right away if any of these occur:    Pain becomes worse or spreads into your arms or legs    Weakness or numbness in one or both arms or legs  Date Last Reviewed: 5/1/2018 2000-2019 [employer]. [address] [place] [address], [place] [address]. All rights reserved. This information is not intended as a substitute for professional medical care. Always follow your healthcare professional's instructions.

## 2020-08-31 NOTE — PROGRESS NOTES
Subjective     Angeli Jacobson is a 70 year old female who presents to clinic today for the following health issues:    History of Present Illness        She eats 4 or more servings of fruits and vegetables daily.She consumes 0 sweetened beverage(s) daily.She exercises with enough effort to increase her heart rate 9 or less minutes per day.  She exercises with enough effort to increase her heart rate 3 or less days per week. She is missing 1 dose(s) of medications per week.         Musculoskeletal problem/pain  Onset/Duration: 6 days  Description  Location: neck - bilateral  Joint Swelling: not applicable  Redness: no  Pain: YES- stabbing pain  Warmth: no  Intensity:  severe  Progression of Symptoms:  worsening  Accompanying signs and symptoms:   Fevers: no  Numbness/tingling/weakness: no  History  Trauma to the area: no  Recent illness:  no  Previous similar problem: YES  Previous evaluation:  no  Precipitating or alleviating factors:  Aggravating factors include: none  Therapies tried and outcome: heat and ice    Denies any numbness or tingling.   She took left over oxycodone which helped her pain this morning.   States her neck feels ok but continues to have stiffness.   Got a massage end of last Thursday.         Review of Systems   Constitutional, HEENT, cardiovascular, pulmonary, GI, , musculoskeletal, neuro, skin, endocrine and psych systems are negative, except as otherwise noted.      Objective    /77   Pulse 93   Temp 97.9  F (36.6  C) (Oral)   Wt 68.5 kg (151 lb)   LMP 03/18/2005   SpO2 98%   BMI 24.01 kg/m    Body mass index is 24.01 kg/m .  Physical Exam   GENERAL: healthy, alert and no distress  NECK: no adenopathy and negative spurling   RESP: lungs clear to auscultation - no rales, rhonchi or wheezes  CV: regular rate and rhythm, normal S1 S2  MS:  No edema, normal ROM, tight levator/trapezius and scalene muscles - right>left   PSYCH: mentation appears normal, affect  normal/bright            Assessment & Plan     Angeli was seen today for musculoskeletal problem.    Diagnoses and all orders for this visit:    Strain of neck muscle, subsequent encounter  -     [facility]; Future    Strain of trapezius muscle, unspecified laterality, subsequent encounter  -     [facility]; Future       - will refer to physical therapy. She is to continue with supportive care including ice/heat.     See Patient Instructions    Return if symptoms worsen or fail to improve.    Kathy Doan MD  [facility]

## 2020-09-01 RX ORDER — ALENDRONATE SODIUM 70 MG/1
TABLET ORAL
Qty: 12 TABLET | Refills: 3 | Status: ON HOLD | OUTPATIENT
Start: 2020-09-01 | End: 2020-12-22

## 2020-09-02 ENCOUNTER — THERAPY VISIT (OUTPATIENT)
Dept: PHYSICAL THERAPY | Facility: CLINIC | Age: 70
End: 2020-09-02
Attending: FAMILY MEDICINE
Payer: COMMERCIAL

## 2020-09-02 DIAGNOSIS — S46.819D STRAIN OF TRAPEZIUS MUSCLE, UNSPECIFIED LATERALITY, SUBSEQUENT ENCOUNTER: ICD-10-CM

## 2020-09-02 DIAGNOSIS — M54.2 NECK PAIN: ICD-10-CM

## 2020-09-02 DIAGNOSIS — S16.1XXD STRAIN OF NECK MUSCLE, SUBSEQUENT ENCOUNTER: ICD-10-CM

## 2020-09-02 PROCEDURE — 97140 MANUAL THERAPY 1/> REGIONS: CPT | Mod: GP | Performed by: PHYSICAL THERAPIST

## 2020-09-02 PROCEDURE — 97110 THERAPEUTIC EXERCISES: CPT | Mod: GP | Performed by: PHYSICAL THERAPIST

## 2020-09-02 PROCEDURE — 97161 PT EVAL LOW COMPLEX 20 MIN: CPT | Mod: GP | Performed by: PHYSICAL THERAPIST

## 2020-09-02 NOTE — PROGRESS NOTES
[facility] Initial Evaluation  Subjective:  The history is provided by the patient. No  was used.   Patient Health History  Angeli Jacobson being seen for neck/head muscle tightness.     Problem began: 8/25/2020.   Problem occurred: unknown   Pain is reported as 8/10 on pain scale.  General health as reported by patient is fair.  Pertinent medical history includes: cancer, depression and thyroid problems.     Medical allergies: none.   Surgeries include:  Cancer surgery and orthopedic surgery.    Current medications:  Muscle relaxants.    Current occupation is retired.                     Therapist Generated HPI Evaluation  Problem details: Patient reports a history of neck tension and typically gets monthly massages to manage.  On 8/25/20 she noted an insidious onset of right occipital and neck pain which gradually progress to the left side as well. She has been on muscle relaxants for 1 week and using Tylenol and had a massage on 8/28/20 without relief. Chief complaints are of left greater than right neck tightness and intermittent sharp occipital pains which occur randomly some times with movement and sometimes at rest. She notes some stiffness, but denies headaches or radiation into the arms. She sleeps well on her sides with 1 pillow under her head..         Type of problem:  Cervical spine.    This is a new condition.  Condition occurred with:  Insidious onset.    Patient reports pain:  Cervical left side and cervical right side.    Pain radiates to:  None.     Associated symptoms:  Loss of motion/stiffness. Symptoms are exacerbated by rotating head, looking up or down, change of position, lifting and driving  and relieved by muscle relaxants and analgesics.                              Objective:  Standing Alignment:    Cervical/Thoracic:  Forward head  Shoulder/UE:  Rounded shoulders, elevated scapula L and elevated scapula R                                   Cervical/Thoracic Evaluation    AROM:  AROM Cervical:    Flexion:            100% stretch  Extension:       100%  Rotation:         Left: 80% R neck     Right: 80% L neck  Side Bend:      Left: 90% R neck     Right:  90% L neck    Strength: prone shoulder extension grade 3+/5 R, 4-/5 L  Headaches: none  Cervical Myotomes:  normal                  DTR's:  not assessed          Cervical Dermatomes:  normal                    Cervical Palpation:  : Moderate in sitting, minimal in pronelying.  Tenderness present at Left:    Upper Trap; Levator; Erector Spinae and Suboccipitals  Tenderness present at Right:    Upper Trap; Levator; Erector Spinae and Suboccipitals    Cervical Stability/Joint Clearing:  normal      Spinal Segmental Conclusions:    Level:  Hypo at C1      Cord Sign:  not assessed                                            General     ROS    Assessment/Plan:    Patient is a 70 year old female with cervical complaints.    Patient has the following significant findings with corresponding treatment plan.                Diagnosis 1:  Neck pain    Pain -  manual therapy and education  Decreased ROM/flexibility - manual therapy and therapeutic exercise  Decreased strength - therapeutic exercise and therapeutic activities  Impaired muscle performance - neuro re-education  Decreased function - therapeutic activities  Impaired posture - neuro re-education    Therapy Evaluation Codes:   1) History comprised of:   Personal factors that impact the plan of care:      None.      2) Examination of Body Systems comprised of:   Body structures and functions that impact the plan of care:      Cervical spine.   Activity limitations that impact the plan of care are:      Driving, Lifting and Reading/Computer work.  3) Clinical presentation characteristics are:   Stable/Uncomplicated.  4) Decision-Making    Low complexity using standardized patient assessment instrument and/or measureable assessment of functional  outcome.  Cumulative Therapy Evaluation is: Low complexity.    Previous and current functional limitations:  (See Goal Flow Sheet for this information)    Short term and Long term goals: (See Goal Flow Sheet for this information)     Communication ability:  Patient appears to be able to clearly communicate and understand verbal and written communication and follow directions correctly.  Treatment Explanation - The following has been discussed with the patient:   RX ordered/plan of care  Anticipated outcomes  Possible risks and side effects  This patient would benefit from PT intervention to resume normal activities.   Rehab potential is good.    Frequency:  1 X week, once daily  Duration:  for 6 weeks  Discharge Plan:  Achieve all LTG.  Independent in home treatment program.  Reach maximal therapeutic benefit.    Please refer to the daily flowsheet for treatment today, total treatment time and time spent performing 1:1 timed codes.

## 2020-09-08 NOTE — PROGRESS NOTES
Pre-Visit Planning     Future Appointments   Date Time Provider Department Center   9/9/2020  8:50 AM Sepideh Burris PA-C CR[facility] CR   9/10/2020 10:40 AM [facility]   9/14/2020  1:00 PM RSCCUS1 [facility]US RS   9/14/2020  1:40 PM RSCCUS1 [facility]US [facility]   9/22/2020 11:30 AM Tiesha Prince MD [facility] SXR     Arrival Time for this Appointment:  8:25 AM   Appointment Notes for this encounter:   cj-Yearly Physical    Questionnaires Reviewed/Assigned    Last OV with provider    Hospital ER Visits    Specialty Visits    Imaging and Lab Review    Recent Procedures    Health Maintenance Due   Topic Date Due     ZOSTER IMMUNIZATION (2 of 3) 03/20/2015     ANNUAL REVIEW OF HM ORDERS  08/13/2020     INFLUENZA VACCINE (1) 09/01/2020     MEDICARE ANNUAL WELLNESS VISIT  08/13/2020     Current Outpatient Medications   Medication     Acetaminophen 325 MG CAPS     alendronate (FOSAMAX) 70 MG tablet     aspirin (ASA) 81 MG chewable tablet     azelastine (ASTELIN) 0.1 % nasal spray     calcium carbonate 600 mg-vitamin D 400 units (CALTRATE) 600-400 MG-UNIT per tablet     clopidogrel (PLAVIX) 75 MG tablet     cyclobenzaprine (FLEXERIL) 10 MG tablet     cyclobenzaprine (FLEXERIL) 10 MG tablet     ezetimibe (ZETIA) 10 MG tablet     levothyroxine (SYNTHROID/LEVOTHROID) 50 MCG tablet     multivitamin, therapeutic (THERA-VIT) TABS tablet     nitroFURantoin macrocrystal-monohydrate (MACROBID) 100 MG capsule     order for DME     ORDER FOR DME     oxybutynin ER (DITROPAN-XL) 5 MG 24 hr tablet     pravastatin (PRAVACHOL) 40 MG tablet     trospium (SANCTURA XR) 60 MG CP24 24 hr capsule     trospium (SANCTURA XR) 60 MG CP24 24 hr capsule     No current facility-administered medications for this visit.        Refills due?    My chart active?  yes    Patient preferred phone number: [phone]

## 2020-09-09 ENCOUNTER — OFFICE VISIT (OUTPATIENT)
Dept: FAMILY MEDICINE | Facility: CLINIC | Age: 70
End: 2020-09-09
Payer: COMMERCIAL

## 2020-09-09 VITALS
BODY MASS INDEX: 23.3 KG/M2 | RESPIRATION RATE: 16 BRPM | HEART RATE: 86 BPM | HEIGHT: 66 IN | OXYGEN SATURATION: 98 % | WEIGHT: 145 LBS | TEMPERATURE: 98.2 F | SYSTOLIC BLOOD PRESSURE: 113 MMHG | DIASTOLIC BLOOD PRESSURE: 76 MMHG

## 2020-09-09 DIAGNOSIS — E78.5 HYPERLIPIDEMIA LDL GOAL <70: ICD-10-CM

## 2020-09-09 DIAGNOSIS — Z00.00 ROUTINE HISTORY AND PHYSICAL EXAMINATION OF ADULT: ICD-10-CM

## 2020-09-09 DIAGNOSIS — M79.662 PAIN OF LEFT LOWER LEG: ICD-10-CM

## 2020-09-09 DIAGNOSIS — Z12.31 ENCOUNTER FOR SCREENING MAMMOGRAM FOR BREAST CANCER: ICD-10-CM

## 2020-09-09 DIAGNOSIS — Z23 NEED FOR PROPHYLACTIC VACCINATION AND INOCULATION AGAINST INFLUENZA: ICD-10-CM

## 2020-09-09 DIAGNOSIS — Z00.00 MEDICARE ANNUAL WELLNESS VISIT, SUBSEQUENT: Primary | ICD-10-CM

## 2020-09-09 LAB
ERYTHROCYTE [DISTWIDTH] IN BLOOD BY AUTOMATED COUNT: 13.9 % (ref 10–15)
HCT VFR BLD AUTO: 41 % (ref 35–47)
HGB BLD-MCNC: 13 G/DL (ref 11.7–15.7)
MCH RBC QN AUTO: 29.5 PG (ref 26.5–33)
MCHC RBC AUTO-ENTMCNC: 31.7 G/DL (ref 31.5–36.5)
MCV RBC AUTO: 93 FL (ref 78–100)
PLATELET # BLD AUTO: 573 10E9/L (ref 150–450)
RBC # BLD AUTO: 4.41 10E12/L (ref 3.8–5.2)
WBC # BLD AUTO: 8.1 10E9/L (ref 4–11)

## 2020-09-09 PROCEDURE — G0008 ADMIN INFLUENZA VIRUS VAC: HCPCS | Performed by: PHYSICIAN ASSISTANT

## 2020-09-09 PROCEDURE — 80061 LIPID PANEL: CPT | Performed by: PHYSICIAN ASSISTANT

## 2020-09-09 PROCEDURE — 90662 IIV NO PRSV INCREASED AG IM: CPT | Performed by: PHYSICIAN ASSISTANT

## 2020-09-09 PROCEDURE — 80053 COMPREHEN METABOLIC PANEL: CPT | Performed by: PHYSICIAN ASSISTANT

## 2020-09-09 PROCEDURE — 85027 COMPLETE CBC AUTOMATED: CPT | Performed by: PHYSICIAN ASSISTANT

## 2020-09-09 PROCEDURE — 99397 PER PM REEVAL EST PAT 65+ YR: CPT | Mod: 25 | Performed by: PHYSICIAN ASSISTANT

## 2020-09-09 PROCEDURE — 36415 COLL VENOUS BLD VENIPUNCTURE: CPT | Performed by: PHYSICIAN ASSISTANT

## 2020-09-09 RX ORDER — PRAVASTATIN SODIUM 40 MG
40 TABLET ORAL DAILY
Qty: 90 TABLET | Refills: 1 | Status: CANCELLED | OUTPATIENT
Start: 2020-09-09

## 2020-09-09 ASSESSMENT — ENCOUNTER SYMPTOMS
ABDOMINAL PAIN: 0
CONSTIPATION: 0
CHILLS: 0
COUGH: 0
DIZZINESS: 0
NERVOUS/ANXIOUS: 0
FREQUENCY: 0
FEVER: 0
HEMATURIA: 0
DIARRHEA: 0
HEMATOCHEZIA: 0
EYE PAIN: 0

## 2020-09-09 ASSESSMENT — ACTIVITIES OF DAILY LIVING (ADL): CURRENT_FUNCTION: NO ASSISTANCE NEEDED

## 2020-09-09 ASSESSMENT — MIFFLIN-ST. JEOR: SCORE: 1194.47

## 2020-09-09 NOTE — PROGRESS NOTES
"SUBJECTIVE:   Angeli Jacobson is a 70 year old female who presents for Preventive Visit.    Are you in the first 12 months of your Medicare coverage?  No    Healthy Habits:     In general, how would you rate your overall health?  Fair    Frequency of exercise:  None    Do you usually eat at least 4 servings of fruit and vegetables a day, include whole grains    & fiber and avoid regularly eating high fat or \"junk\" foods?  Yes    Taking medications regularly:  Yes    Medication side effects:  None    Ability to successfully perform activities of daily living:  No assistance needed    Home Safety:  No safety concerns identified    Hearing Impairment:  No hearing concerns    In the past 6 months, have you been bothered by leaking of urine? Yes    In general, how would you rate your overall mental or emotional health?  Good      PHQ-2 Total Score: 1    Additional concerns today:  Yes    Do you feel safe in your environment? Yes    Have you ever done Advance Care Planning? (For example, a Health Directive, POLST, or a discussion with a medical provider or your loved ones about your wishes): Yes, advance care planning is on file.      Fall risk  Fallen 2 or more times in the past year?: No  Any fall with injury in the past year?: No    Cognitive Screening   1) Repeat 3 items (Leader, Season, Table)    2) Clock draw: NORMAL  3) 3 item recall: Recalls 3 objects  Results: 3 items recalled: COGNITIVE IMPAIRMENT LESS LIKELY and normal clock    Mini-CogTM Copyright S [employer]. Licensed by the author for use in [facility]; reprinted with permission (caridad@.[email or web address]). All rights reserved.      Do you have sleep apnea, excessive snoring or daytime drowsiness?: no    Reviewed and updated as needed this visit by clinical staff  Tobacco  Allergies  Med Hx  Surg Hx  Fam Hx  Soc Hx      Future Appointments   Date Time Provider Department Center   9/9/2020  8:50 AM Sepideh Burrsi PA-C CR[facility] CR   9/10/2020 10:40 AM " [facility]   9/14/2020  1:00 PM RSCCUS1 [facility]US [facility]   9/14/2020  1:40 PM [facility]US1 [facility]   9/22/2020 11:30 [facility]     Appointment Notes for this encounter:   cj-Yearly Physical    Health Maintenance Due   Topic Date Due     ZOSTER IMMUNIZATION (2 of 3) 03/20/2015     ANNUAL REVIEW OF HM ORDERS  08/13/2020     INFLUENZA VACCINE (1) 09/01/2020     MEDICARE ANNUAL WELLNESS VISIT  08/13/2020     Health Maintenance addressed:  Flu Vaccine    Flu Vaccine Given during rooming process    MyChart Status:  Active and Using      Reviewed and updated as needed this visit by Provider        Social History     Tobacco Use     Smoking status: Never Smoker     Smokeless tobacco: Never Used   Substance Use Topics     Alcohol use: No     Frequency: Monthly or less     Drinks per session: 1 or 2     Binge frequency: Never         Alcohol Use 9/9/2020   Prescreen: >3 drinks/day or >7 drinks/week? Not Applicable   Prescreen: >3 drinks/day or >7 drinks/week? -           Patient has noted increased discomfort of left leg around knee.  Currently in physical therapy for neck.     Current providers sharing in care for this patient include:   Patient Care Team:  Sepideh Burris PA-C as PCP - General (Physician Assistant)  Sepideh Burris PA-C as Assigned PCP  Jennifer Oquendo MD as MD (Urology)  Althea Mccloud, ANNEMARIE as Personal Advocate & Liaison (PAL) (Nurse)    The following health maintenance items are reviewed in Epic and correct as of today:  Health Maintenance   Topic Date Due     ZOSTER IMMUNIZATION (2 of 3) 03/20/2015     ANNUAL REVIEW OF HM ORDERS  08/13/2020     INFLUENZA VACCINE (1) 09/01/2020     MEDICARE ANNUAL WELLNESS VISIT  08/13/2020     FALL RISK ASSESSMENT  11/19/2020     PHQ-9  01/28/2021     COLORECTAL CANCER SCREENING  02/05/2021     LIPID  06/09/2021     DEXA  09/30/2021     MAMMO SCREENING  09/30/2021     ADVANCE CARE PLANNING  12/20/2023      "DTAP/TDAP/TD IMMUNIZATION (4 - Td) 08/28/2028     HEPATITIS C SCREENING  Completed     DEPRESSION ACTION PLAN  Completed     PNEUMOCOCCAL IMMUNIZATION 65+ LOW/MEDIUM RISK  Completed     IPV IMMUNIZATION  Aged Out     MENINGITIS IMMUNIZATION  Aged Out     HEPATITIS B IMMUNIZATION  Aged Out     Lab work is in process  Pneumonia Vaccine:Adults age 65+ who received Pneumovax (PPSV23) at 65 years or older: Should be given PCV13 > 1 year after their most recent PPSV23    Review of Systems   Constitutional: Negative for chills and fever.   HENT: Negative for congestion and ear pain.    Eyes: Negative for pain.   Respiratory: Negative for cough.    Cardiovascular: Negative for chest pain.   Gastrointestinal: Negative for abdominal pain, constipation, diarrhea and hematochezia.   Breasts:  negative.    Genitourinary: Negative for frequency and hematuria.   Musculoskeletal:        Left leg pain, lower     Skin: Negative.    Neurological: Negative for dizziness.   Psychiatric/Behavioral: The patient is not nervous/anxious.          OBJECTIVE:   /76 (BP Location: Right arm, Patient Position: Chair, Cuff Size: Adult Regular)   Pulse 86   Temp 98.2  F (36.8  C) (Oral)   Resp 16   Ht 1.676 m (5' 6\")   Wt 65.8 kg (145 lb)   LMP 03/18/2005   SpO2 98%   BMI 23.40 kg/m   Estimated body mass index is 23.4 kg/m  as calculated from the following:    Height as of this encounter: 1.676 m (5' 6\").    Weight as of this encounter: 65.8 kg (145 lb).  Physical Exam  GENERAL: healthy, alert and no distress  EYES: Eyes grossly normal to inspection, PERRL and conjunctivae and sclerae normal  HENT: ear canals and TM's normal, nose and mouth without ulcers or lesions  NECK: no adenopathy, no asymmetry, masses, or scars and thyroid normal to palpation  RESP: lungs clear to auscultation - no rales, rhonchi or wheezes  BREAST: normal without masses, tenderness or nipple discharge and no palpable axillary masses or adenopathy  CV: regular " "rate and rhythm, normal S1 S2, no S3 or S4, no murmur, click or rub, no peripheral edema and peripheral pulses strong  ABDOMEN: soft, nontender, no hepatosplenomegaly, no masses and bowel sounds normal  MS: 1 edema to left leg  SKIN: no suspicious lesions or rashes  NEURO: Normal strength and tone, mentation intact and speech normal  PSYCH: mentation appears normal, affect normal/bright        ASSESSMENT / PLAN:   1. Medicare annual wellness visit, subsequent    - C ANNUAL WELLNESS VISIT, PPS, SUBSEQUENT    2. Routine history and physical examination of adult    - C ANNUAL WELLNESS VISIT, PPS, SUBSEQUENT  - Lipid panel reflex to direct LDL Fasting  - CBC with platelets  - Comprehensive metabolic panel (BMP + Alb, Alk Phos, ALT, AST, Total. Bili, TP)    3. Hyperlipidemia LDL goal <70  Await labs since pravastatin use    4. Pain of left lower leg    - [facility] PT, HAND, AND CHIROPRACTIC REFERRAL; Future    5. Encounter for screening mammogram for breast cancer    - *MA Screening Digital Bilateral; Future    COUNSELING:  Reviewed preventive health counseling, as reflected in patient instructions       Regular exercise       Healthy diet/nutrition       Vision screening       Hearing screening       Dental care       Bladder control       Fall risk prevention       Immunizations    Vaccinated for: Influenza      Will check with insurance about Shingrix.         Estimated body mass index is 23.4 kg/m  as calculated from the following:    Height as of this encounter: 1.676 m (5' 6\").    Weight as of this encounter: 65.8 kg (145 lb).        She reports that she has never smoked. She has never used smokeless tobacco.      Appropriate preventive services were discussed with this patient, including applicable screening as appropriate for cardiovascular disease, diabetes, osteopenia/osteoporosis, and glaucoma.  As appropriate for age/gender, discussed screening for colorectal cancer, prostate cancer, breast cancer, and cervical " cancer. Checklist reviewing preventive services available has been given to the patient.    Reviewed patients plan of care and provided an AVS. The Intermediate Care Plan ( asthma action plan, low back pain action plan, and migraine action plan) for Angeli meets the Care Plan requirement. This Care Plan has been established and reviewed with the Patient.    Counseling Resources:  ATP IV Guidelines  Pooled Cohorts Equation Calculator  Breast Cancer Risk Calculator  Breast Cancer: Medication to Reduce Risk  FRAX Risk Assessment  ICSI Preventive Guidelines  Dietary Guidelines for Americans, 2010  USDA's MyPlate  ASA Prophylaxis  Lung CA Screening    Sepideh Burris PA-C  [facility]    Identified Health Risks:

## 2020-09-10 ENCOUNTER — THERAPY VISIT (OUTPATIENT)
Dept: PHYSICAL THERAPY | Facility: CLINIC | Age: 70
End: 2020-09-10
Attending: FAMILY MEDICINE
Payer: COMMERCIAL

## 2020-09-10 DIAGNOSIS — M54.2 NECK PAIN: ICD-10-CM

## 2020-09-10 LAB
ALBUMIN SERPL-MCNC: 2.8 G/DL (ref 3.4–5)
ALP SERPL-CCNC: 104 U/L (ref 40–150)
ALT SERPL W P-5'-P-CCNC: 27 U/L (ref 0–50)
ANION GAP SERPL CALCULATED.3IONS-SCNC: 8 MMOL/L (ref 3–14)
AST SERPL W P-5'-P-CCNC: 20 U/L (ref 0–45)
BILIRUB SERPL-MCNC: 0.2 MG/DL (ref 0.2–1.3)
BUN SERPL-MCNC: 15 MG/DL (ref 7–30)
CALCIUM SERPL-MCNC: 9.7 MG/DL (ref 8.5–10.1)
CHLORIDE SERPL-SCNC: 103 MMOL/L (ref 94–109)
CHOLEST SERPL-MCNC: 205 MG/DL
CO2 SERPL-SCNC: 26 MMOL/L (ref 20–32)
CREAT SERPL-MCNC: 0.68 MG/DL (ref 0.52–1.04)
GFR SERPL CREATININE-BSD FRML MDRD: 89 ML/MIN/{1.73_M2}
GLUCOSE SERPL-MCNC: 89 MG/DL (ref 70–99)
HDLC SERPL-MCNC: 37 MG/DL
LDLC SERPL CALC-MCNC: 146 MG/DL
NONHDLC SERPL-MCNC: 168 MG/DL
POTASSIUM SERPL-SCNC: 4.3 MMOL/L (ref 3.4–5.3)
PROT SERPL-MCNC: 8.6 G/DL (ref 6.8–8.8)
SODIUM SERPL-SCNC: 137 MMOL/L (ref 133–144)
TRIGL SERPL-MCNC: 111 MG/DL

## 2020-09-10 PROCEDURE — 97110 THERAPEUTIC EXERCISES: CPT | Mod: GP | Performed by: PHYSICAL THERAPIST

## 2020-09-10 PROCEDURE — 97140 MANUAL THERAPY 1/> REGIONS: CPT | Mod: GP | Performed by: PHYSICAL THERAPIST

## 2020-09-13 ENCOUNTER — MYC MEDICAL ADVICE (OUTPATIENT)
Dept: FAMILY MEDICINE | Facility: CLINIC | Age: 70
End: 2020-09-13

## 2020-09-14 ENCOUNTER — HOSPITAL ENCOUNTER (OUTPATIENT)
Dept: ULTRASOUND IMAGING | Facility: CLINIC | Age: 70
End: 2020-09-14
Attending: INTERNAL MEDICINE
Payer: COMMERCIAL

## 2020-09-14 DIAGNOSIS — I73.9 PAD (PERIPHERAL ARTERY DISEASE) (H): ICD-10-CM

## 2020-09-14 PROCEDURE — 93924 LWR XTR VASC STDY BILAT: CPT

## 2020-09-14 PROCEDURE — 93926 LOWER EXTREMITY STUDY: CPT | Mod: LT

## 2020-09-14 NOTE — TELEPHONE ENCOUNTER
Sepideh Burris PA-C   Please review my chart message     Thank you,   Le [place], Registered Nurse, PAL (Patient Advocate Liason)   [facility]   [phone]

## 2020-09-16 ENCOUNTER — PRE VISIT (OUTPATIENT)
Dept: UROLOGY | Facility: CLINIC | Age: 70
End: 2020-09-16

## 2020-09-16 NOTE — TELEPHONE ENCOUNTER
Reason for visit: Medication follow up     Relevant information: PFD    Records/imaging/labs/orders: all records available    Pt called: no need for a call

## 2020-09-17 ENCOUNTER — HOSPITAL ENCOUNTER (OUTPATIENT)
Dept: OCCUPATIONAL THERAPY | Facility: CLINIC | Age: 70
Setting detail: THERAPIES SERIES
End: 2020-09-17
Attending: PHYSICIAN ASSISTANT
Payer: COMMERCIAL

## 2020-09-17 DIAGNOSIS — R59.1 LYMPHADENOPATHY: ICD-10-CM

## 2020-09-17 PROCEDURE — 97166 OT EVAL MOD COMPLEX 45 MIN: CPT | Mod: GO | Performed by: OCCUPATIONAL THERAPIST

## 2020-09-17 NOTE — PROGRESS NOTES
[facility]        OUTPATIENT OCCUPATIONAL THERAPY EDEMA EVALUATION  PLAN OF TREATMENT FOR OUTPATIENT REHABILITATION  (COMPLETE FOR INITIAL CLAIMS ONLY)  Patient's Last Name, First Name, Angeli Pond                           Provider s Name:   [facility] Medical Record No.  [id number]     Start of Care Date:  09/17/20   Onset Date:  09/17/18   Type:  OT   Medical Diagnosis:  Lymphedema   Therapy Diagnosis:  Lymphedema Visits from SOC:  1                                     __________________________________________________________________________________   Plan of Treatment/Functional Goals:    Manual lymph drainage, Gradient compression bandaging, Fit for compression garment, Exercises, Precautions to prevent infection / exacerbation, Education, Manual therapy, ADL training, Skin care / precautions, Home management program development        GOALS  1. Goal description: Pt will be independent with home program modifications.       Target date: 12/04/20  2. Goal description: Pt will decrease the tightness and fullness in the leg 10 % of total swelling to clear stairs easier.       Target date: 12/04/20  3. Goal description: Pt will be able to flex knee to 90* with less fibrosis in the knee.       Target date: 12/04/20  4.            5.            6.               7.             8.              Treatment Frequency: 5x/week   Treatment duration: 3 weeks, then 0x/week for 4 weeks and once a week for one week.    JCARLOS Chacon CERTIFY THE NEED FOR THESE SERVICES FURNISHED UNDER        THIS PLAN OF TREATMENT AND WHILE UNDER MY CARE     (Physician co-signature of this document indicates review and certification of the therapy plan).                   Certification date from: 09/17/20       Certification date to:  12/04/20           Referring physician: Sepideh Burris PA-C   Initial Assessment  See Epic Evaluation- Start of care: 09/17/20

## 2020-09-22 ENCOUNTER — OFFICE VISIT (OUTPATIENT)
Dept: SURGERY | Facility: CLINIC | Age: 70
End: 2020-09-22
Attending: INTERNAL MEDICINE
Payer: COMMERCIAL

## 2020-09-22 VITALS
BODY MASS INDEX: 23.3 KG/M2 | RESPIRATION RATE: 16 BRPM | SYSTOLIC BLOOD PRESSURE: 108 MMHG | OXYGEN SATURATION: 98 % | WEIGHT: 145 LBS | HEIGHT: 66 IN | DIASTOLIC BLOOD PRESSURE: 70 MMHG | HEART RATE: 77 BPM

## 2020-09-22 DIAGNOSIS — E03.9 HYPOTHYROIDISM, UNSPECIFIED TYPE: ICD-10-CM

## 2020-09-22 DIAGNOSIS — I89.0 LYMPHEDEMA OF LEFT LEG: Primary | ICD-10-CM

## 2020-09-22 DIAGNOSIS — E78.5 HYPERLIPIDEMIA LDL GOAL <70: ICD-10-CM

## 2020-09-22 DIAGNOSIS — Z95.828 HISTORY OF ARTERIAL BYPASS OF LOWER EXTREMITY: ICD-10-CM

## 2020-09-22 DIAGNOSIS — C49.9 MYXOID LIPOSARCOMA (H): ICD-10-CM

## 2020-09-22 PROCEDURE — 99215 OFFICE O/P EST HI 40 MIN: CPT | Performed by: INTERNAL MEDICINE

## 2020-09-22 RX ORDER — ROSUVASTATIN CALCIUM 20 MG/1
20 TABLET, COATED ORAL DAILY
Qty: 30 TABLET | Refills: 11 | Status: SHIPPED | OUTPATIENT
Start: 2020-09-22 | End: 2021-02-16

## 2020-09-22 ASSESSMENT — MIFFLIN-ST. JEOR: SCORE: 1194.47

## 2020-09-22 NOTE — PATIENT INSTRUCTIONS
Stop pravastatin instead crestor 20 mg daily     Repeat fasting lipids in 3-4 months then virtual or in office visit with me , orders placed.    Go for lymphedema therapy , elevate legs , use compression stockings etc    Continue rest of the medications same.    Recent ADRIAN and arterial duplex ultrasound looks good .

## 2020-09-24 ENCOUNTER — VIRTUAL VISIT (OUTPATIENT)
Dept: UROLOGY | Facility: CLINIC | Age: 70
End: 2020-09-24
Payer: COMMERCIAL

## 2020-09-24 DIAGNOSIS — M62.89 PELVIC FLOOR DYSFUNCTION: ICD-10-CM

## 2020-09-24 DIAGNOSIS — N32.81 OAB (OVERACTIVE BLADDER): Primary | ICD-10-CM

## 2020-09-24 DIAGNOSIS — E03.9 HYPOTHYROIDISM, UNSPECIFIED TYPE: Primary | ICD-10-CM

## 2020-09-24 DIAGNOSIS — Z79.899 REFERRED FOR MANAGEMENT OF MEDICATION THERAPY: ICD-10-CM

## 2020-09-24 DIAGNOSIS — Z87.440 PERSONAL HISTORY OF URINARY TRACT INFECTION: ICD-10-CM

## 2020-09-24 RX ORDER — LEVOTHYROXINE SODIUM 50 UG/1
TABLET ORAL
Qty: 90 TABLET | Refills: 3 | Status: SHIPPED | OUTPATIENT
Start: 2020-09-24 | End: 2021-05-24

## 2020-09-24 NOTE — PATIENT INSTRUCTIONS
Websites with free information:    American [employer] patient website: www.voicesforpfd.org    Total Control Program: www.totalcontrolprogram.com    We discussed considering mirabegron ([email or web address])    Referral to Medication Therapy Management    Follow up in 6 months, sooner if needed (video visit via [employer])    It was a pleasure meeting with you today.  Thank you for allowing me and my team the privilege of caring for you today.  YOU are the reason we are here, and I truly hope we provided you with the excellent service you deserve.  Please let us know if there is anything else we can do for you so that we can be sure you are leaving completely satisfied with your care experience.

## 2020-09-24 NOTE — NURSING NOTE
Angeli ALBA Kavon has given verbal permission for a phone visit 09/24/20 10:09 AM and would like to be reached at # [phone]     Called the pt 09/24/20, and 10:09 AM and reviewed their medications, history, and allergies. I then asked that they be in a quiet location with minimal distractions so they can hear the provider well.    Chief Complaint   Patient presents with     Telemedicine consult     PFD       Last menstrual period 03/18/2005, not currently breastfeeding. There is no height or weight on file to calculate BMI.    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)     Postural urinary incontinence     Personal history of urinary tract infection     OAB (overactive bladder)     Myalgia of pelvic floor     Lesion of bladder     S/P total hip arthroplasty       Allergies   Allergen Reactions     Celexa [Citalopram Hydrobromide]      Decreased libido       Current Outpatient Medications   Medication Sig Dispense Refill     alendronate (FOSAMAX) 70 MG tablet TAKE 1 TABLET BY MOUTH EVERY 7 DAYS W/8 OZ WATER 30 MIN BEFORE BREAKFAST/REMAIN UPRIGHT 12 tablet 3     aspirin (ASA) 81 MG chewable tablet Take 81 mg by mouth daily       calcium carbonate 600 mg-vitamin D 400 units (CALTRATE) 600-400 MG-UNIT per tablet Take 1 chew tab by mouth daily       clopidogrel (PLAVIX) 75 MG tablet Take 1 tablet (75 mg) by mouth daily 90 tablet 3     ezetimibe (ZETIA) 10 MG tablet Take 1 tablet (10 mg) by mouth daily 90 tablet 3      levothyroxine (SYNTHROID/LEVOTHROID) 50 MCG tablet TAKE 1 TABLET BY MOUTH EVERY DAY 90 tablet 3     multivitamin, therapeutic (THERA-VIT) TABS tablet Take 1 tablet by mouth daily       nitroFURantoin macrocrystal-monohydrate (MACROBID) 100 MG capsule Take 1 tablet after intercourse 30 capsule 3     order for DME Equipment being ordered: Left Thigh High Compression Stocking, 550 CCL.3 1 each 99     ORDER FOR DME Equipment being ordered: lymphedema bandages 2 Device 1     oxybutynin ER (DITROPAN-XL) 5 MG 24 hr tablet TAKE 1 TABLET BY MOUTH EVERY DAY 90 tablet 1     rosuvastatin (CRESTOR) 20 MG tablet Take 1 tablet (20 mg) by mouth daily 30 tablet 11       Social History     Tobacco Use     Smoking status: Never Smoker     Smokeless tobacco: Never Used   Substance Use Topics     Alcohol use: No     Frequency: Monthly or less     Drinks per session: 1 or 2     Binge frequency: Never     Drug use: No       Chaka Meier, EMT,  9/24/2020  10:09 AM

## 2020-09-24 NOTE — TELEPHONE ENCOUNTER
"levothyroxine (SYNTHROID/LEVOTHROID) 50 MCG tablet  Last Written Prescription Date:  11/13/19  Last Fill Quantity: 90,  # refills: 3     Last office visit: 9/22/20  Follow up due:  [id number]  Requested Prescriptions   Pending Prescriptions Disp Refills     levothyroxine (SYNTHROID/LEVOTHROID) 50 MCG tablet [Pharmacy Med Name: LEVOTHYROXINE 50 MCG TABLET] 90 tablet 3     Sig: TAKE 1 TABLET BY MOUTH EVERY DAY       Thyroid Protocol Passed - 9/24/2020 12:39 AM        Passed - Patient is 12 years or older        Passed - Recent (12 mo) or future (30 days) visit within the authorizing provider's specialty     Patient has had an office visit with the authorizing provider or a provider within the authorizing providers department within the previous 12 mos or has a future within next 30 days. See \"Patient Info\" tab in inbasket, or \"Choose Columns\" in Meds & Orders section of the refill encounter.              Passed - Medication is active on med list        Passed - Normal TSH on file in past 12 months     Recent Labs   Lab Test 06/09/20  0907   TSH 3.37              Passed - No active pregnancy on record     If patient is pregnant or has had a positive pregnancy test, please check TSH.          Passed - No positive pregnancy test in past 12 months     If patient is pregnant or has had a positive pregnancy test, please check TSH.             Prescription approved per [facility] Refill Protocol.  Barbara LUONGN  [facility]  [phone]            "

## 2020-09-24 NOTE — PROGRESS NOTES
"September 24, 2020    Angeli Jacobson is a 70 year old female who is being evaluated via a billable telephone visit.      Please Call # [phone]    The patient has been notified of following:     \"This telephone visit will be conducted via a call between you and your physician/provider. We have found that certain health care needs can be provided without the need for a physical exam.  This service lets us provide the care you need with a short phone conversation.  If a prescription is necessary we can send it directly to your pharmacy.  If lab work is needed we can place an order for that and you can then stop by our lab to have the test done at a later time.    Telephone visits are billed at different rates depending on your insurance coverage. During this emergency period, for some insurers they may be billed the same as an in-person visit.  Please reach out to your insurance provider with any questions.    If during the course of the call the physician/provider feels a telephone visit is not appropriate, you will not be charged for this service.\"    Patient has given verbal consent for Telephone visit?  Yes 10:06 AM 09/24/20     How would you like to obtain your AVS? Nica    Patient has elected a telephone call for today's follow up.  Called patient today and verified her name and date of birth prior to discussion.    Today's telephone visit is to discuss her bladder symptoms.  We did try a newer anticholinergic but she felt that she was having cognitive side effects from this so is back on the oxybutynin    Patient denies any changes to her past medical, surgical or social history.  Patient denies any changes to her medications or allergies    A/P:  Angeli Jacobson is a 70 year old F with OAB with moderate control on oxybutynin    We discussed the addition of mirabegron for better symptom control and she wishes to consider.  She is concerned about more medications and side effects of the " medications.  We spent time discussing that just because the anticholinergics cause dry mouth and eyes it does not cause her entire body to become dehydrated    She is very concerned about her medications and the interactions.  I have placed a referral for MTM for her as she is wanted to discuss her medications with a pharmacist    10 minutes were spent in patient care today    Jennifer Oquendo MD MPH   of Urology    CC  Patient Care Team:  Sepideh Burris PA-C as PCP - General (Physician Assistant)  Sepideh Burris PA-C as Assigned PCP  Jennifer Oquendo MD as MD (Urology)  Althea Mccloud, ANNEMARIE as Personal Advocate & Liaison (PAL) (Nurse)  SELF, REFERRED

## 2020-09-24 NOTE — LETTER
"9/24/2020       RE: Angeli Jacobson  [address]  [facility] [address]     Dear Colleague,    Thank you for referring your patient, Angeli Jacobson, to the [facility] UROLOGY AND INST FOR PROSTATE AND UROLOGIC CANCERS at [facility]. Please see a copy of my visit note below.    September 24, 2020    Angeli Jacobson is a 70 year old female who is being evaluated via a billable telephone visit.      Please Call # [phone]    The patient has been notified of following:     \"This telephone visit will be conducted via a call between you and your physician/provider. We have found that certain health care needs can be provided without the need for a physical exam.  This service lets us provide the care you need with a short phone conversation.  If a prescription is necessary we can send it directly to your pharmacy.  If lab work is needed we can place an order for that and you can then stop by our lab to have the test done at a later time.    Telephone visits are billed at different rates depending on your insurance coverage. During this emergency period, for some insurers they may be billed the same as an in-person visit.  Please reach out to your insurance provider with any questions.    If during the course of the call the physician/provider feels a telephone visit is not appropriate, you will not be charged for this service.\"    Patient has given verbal consent for Telephone visit?  Yes 10:06 AM 09/24/20     How would you like to obtain your AVS? Nica    Patient has elected a telephone call for today's follow up.  Called patient today and verified her name and date of birth prior to discussion.    Today's telephone visit is to discuss her bladder symptoms.  We did try a newer anticholinergic but she felt that she was having cognitive side effects from this so is back on the oxybutynin    Patient denies any changes to her past medical, surgical or " social history.  Patient denies any changes to her medications or allergies    A/P:  Angeli Jacobson is a 70 year old F with OAB with moderate control on oxybutynin    We discussed the addition of mirabegron for better symptom control and she wishes to consider.  She is concerned about more medications and side effects of the medications.  We spent time discussing that just because the anticholinergics cause dry mouth and eyes it does not cause her entire body to become dehydrated    She is very concerned about her medications and the interactions.  I have placed a referral for MTM for her as she is wanted to discuss her medications with a pharmacist    10 minutes were spent in patient care today    Jennifer Oquendo MD MPH   of Urology    CC  Patient Care Team:  Sepideh Burris PA-C as PCP - General (Physician Assistant)  Sepideh Burris PA-C as Assigned PCP  Jennifer Oquendo MD as MD (Urology)  Althea Mccloud, RN as Personal Advocate & Liaison (PAL) (Nurse)  SELF, REFERRED

## 2020-10-06 ENCOUNTER — VIRTUAL VISIT (OUTPATIENT)
Dept: PHARMACY | Facility: CLINIC | Age: 70
End: 2020-10-06
Payer: COMMERCIAL

## 2020-10-06 DIAGNOSIS — N32.81 OAB (OVERACTIVE BLADDER): ICD-10-CM

## 2020-10-06 DIAGNOSIS — G47.00 INSOMNIA, UNSPECIFIED TYPE: ICD-10-CM

## 2020-10-06 DIAGNOSIS — E78.5 HYPERLIPIDEMIA LDL GOAL <70: ICD-10-CM

## 2020-10-06 DIAGNOSIS — M81.0 AGE-RELATED OSTEOPOROSIS WITHOUT CURRENT PATHOLOGICAL FRACTURE: Primary | ICD-10-CM

## 2020-10-06 DIAGNOSIS — I73.9 PAD (PERIPHERAL ARTERY DISEASE) (H): ICD-10-CM

## 2020-10-06 DIAGNOSIS — E03.9 HYPOTHYROIDISM, UNSPECIFIED TYPE: ICD-10-CM

## 2020-10-06 PROCEDURE — 99607 MTMS BY PHARM ADDL 15 MIN: CPT | Mod: TEL | Performed by: PHARMACIST

## 2020-10-06 PROCEDURE — 99605 MTMS BY PHARM NP 15 MIN: CPT | Mod: TEL | Performed by: PHARMACIST

## 2020-10-06 RX ORDER — LANOLIN ALCOHOL/MO/W.PET/CERES
3 CREAM (GRAM) TOPICAL
COMMUNITY
End: 2021-02-05

## 2020-10-06 NOTE — PROGRESS NOTES
MTM ENCOUNTER  SUBJECTIVE/OBJECTIVE:                           Angeli Jacobson is a 70 year old female called for an initial visit. She was referred to me from Dr. Oquendo.      Patient consented to a telehealth visit: yes  Telemedicine Visit Details  Type of service:  Telephone visit  Start Time: 11:00 AM  End Time: 11:44 AM  Originating Location (pt. Location): Home  Distant Location (provider location):  [facility] [place]  Mode of Communication:  Telephone    Chief Complaint: Medication questions:  -- rosuvastatin side effects  -- oxybutynin side effects    Allergies/ADRs: Reviewed in chart  Tobacco: She reports that she has never smoked. She has never used smokeless tobacco.  Alcohol: not currently using    Medication Adherence/Access: no issues reported    Osteoporosis:   Alendronate 70 mg each week  Calcium/D daily  Multivitamin daily     DEXA 2019 -- severe osteoporosis. She is currently on Centrum silver (25 mcg vitamin D and 300 mg calcium) and calcium/D 600 mg calcium with D3 10 mcg. Gets some calcium in diet through yogurt and string cheese on a daily basis. She does spread her supplements throughout the day. She is aware of the interaction between levothyroxine/alendronate and calcium products/multivitamins. Denies concerns with alendronate. She is able to stay upright as directed after her dose.     Chart review indicates she was briefly on the medication between 4235-6342 and then re-started again in 2017. She is not exactly sure on the duration, but states that sounds about right.    Hyperlipidemia/History of arterial bypass for PAD:   Rosuvastatin 20 mg daily  Clopidogrel 75 mg daily  Aspirin 81 mg daily  Ezetimibe 10 mg daily    Pt reports no significant myalgias or other side effects. She was recently switched from a combination of Zetia 10 mg daily with pravastatin 40 mg daily. Change made around 9/22 with the vascular clinic who plans to re-check her lipids in 3-4 months.     She reports  that she started on rosuvastatin earlier this year at 40 mg daily and she was generally not feeling well so this was switched to pravastatin. Her vascular provider, Dr. Prince, encouraged her to switch to rosuvastatin at a lower dose. She would like to know more about rosuvastatin.      The 10-year ASCVD risk score (Naveed DAVIS Jr., et al., 2013) is: 7.5%    Values used to calculate the score:      Age: 70 years      Sex: Female      Is Non- : No      Diabetic: No      Tobacco smoker: No      Systolic Blood Pressure: 108 mmHg      Is BP treated: No      HDL Cholesterol: 37 mg/dL      Total Cholesterol: 205 mg/dL  Recent Labs   Lab Test 09/09/20  0927 06/09/20  0907 09/11/15  0901 09/11/15  0901 08/11/15  1008   CHOL 205* 310*   < > 263* 284*   HDL 37* 57   < > 66 70   * 226*   < > 186* 197*   TRIG 111 135   < > 53 84   CHOLHDLRATIO  --   --   --  4.0 4.1    < > = values in this interval not displayed.     Hypothyroidism:   Levothyroxine 50 mcg daily     Patient is having the following symptoms: none.   TSH   Date Value Ref Range Status   06/09/2020 3.37 0.40 - 4.00 mU/L Final     Overactive Bladder:   Oxybutynin ER 5 mg daily  Nitrofurantoin 100 mg as needed for intercourse     Followed by urology Dr. Oquendo. Reports oxybutynin helps with bladder spasms, but feels dry mouth/dry eyes overnight that are bothersome to her. She is concerned about dehydration from this. She just refilled her oxybutynin for 90 days and she is only about a week or two into this so she doesn't know if she wants to abandon it quite yet. She last saw Dr. Oquendo on 9/24 who recommended she consider switching to Myrbetriq. She would like to know more about this.     Insomnia:   Melatonin 3 mg nightly    Reports taking melatonin at bedtime. She heard that you should take this earlier in the evening and is wondering if I know anything about that. She does find melatonin to be somewhat helpful.    ASSESSMENT:                           Medicare Part D topics discussed:OTC products and Medication changes    Medication Adherence: No issues identified    Osteoporosis: She is currently on bisphosphonate therapy which is indicated based on her recent DEXA. She has not yet been on this for a cumulative of 5 years. Calcium and vitamin D appear adequate based on our discussion and she is appropriately spreading calcium out during the day.    Hyperlipidemia/History of arterial bypass for PAD: She is on high-intensity statin therapy as indicated by her history of PAD and cholesterol. Reviewed guidelines and statin potency. Reviewed hydrophilic property of rosuvastatin which typically leads to less myalgias compared to other statins. She appears to be tolerating at this time, which is a positive sign.    Hypothyroidism: Stable.     Overactive Bladder:  Angeli may benefit from switching to Myrbetriq as suggested by Dr. Oquendo. We reviewed the difference between anti-cholinergic properties and fluid balance maintained by the kidneys/bowels. I agree with Dr. Oquendo that oxybutynin is unlikely to cause dehydration. That being said, Myrbetriq has a different mechanism and could relieve some of the bothersome dryness effects she has been having.     Insomnia: Discussed timing of melatonin, ideally 30-60 minutes prior to bedtime for peak effect.     PLAN:                            1. If interested in switching to Myrbetriq, please contact Dr. Oquendo's office to obtain a prescription     2. Try taking melatonin 30-60 minutes before bedtime    I spent 44 minutes with this patient today (an extra 15 minutes was spent creating the Medication Action Plan). I offer these suggestions for consideration by her care team. A copy of the visit note was provided to the patient's referring provider.    Will follow up in year or sooner if needed.     The patient was mailed a summary of these recommendations.     Verenice John PharmD, BCACP  MTM Pharmacist    [facility]  Gastroenterology and Rheumatology  Phone: [phone]

## 2020-10-06 NOTE — LETTER
"      [facility]     Date: 10/6/2020    Angeli Jacobson  [address] [facility] [address]    Dear Ms. Jacobson,    Thank you for talking with me on  about your health and medications. Medicare s MTM (Medication Therapy Management) program helps you understand your medications and use them safely.      This letter includes an action plan (Medication Action Plan) and medication list (Personal Medication List). The action plan has steps you should take to help you get the best results from your medications. The medication list will help you keep track of your medications and how to use them the right way.       Have your action plan and medication list with you when you talk with your doctors, pharmacists, and other healthcare providers in your care team.     Ask your doctors, pharmacists, and other healthcare providers to update the action plan and medication list at every visit.     Take your medication list with you if you go to the hospital or emergency room.     Give a copy of the action plan and medication list to your family or caregivers.     If you want to talk about this letter or any of the papers with it, please call   [phone].   We look forward to working with you, your doctors, and other healthcare providers to help you stay healthy.     Sincerely,    Verenice John [facility]      Enclosed: Medication Action Plan and Personal Medication List    MEDICATION ACTION PLAN FOR Angeli Jacobson,  1950     This action plan will help you get the best results from your medications if you:   1. Read \"What we talked about.\"   2. Take the steps listed in the \"What I need to do\" boxes.   3. Fill in \"What I did and when I did it.\"   4. Fill in \"My follow-up plan\" and \"Questions I want to ask.\"     Have this action plan with you when you talk with your doctors, pharmacists, and other healthcare providers in your care team. Share this with your family or caregivers " too.  DATE PREPARED: 10/6/2020  What we talked about: Myrbetriq works differently than oxybutynin ER to relieve symptoms of overactive bladder. I would expect Myrbetriq to cause less dry mouth/dry eyes than oxybutynin.                                                 What I need to do: If you are interested in switching to Myrbetriq, please contact Dr. Oquendo's office.       What I did and when I did it:                                              What we talked about: Timing of melatonin                                                  What I need to do: Try taking melatonin 30-60 minutes before bedtime for the maximum effect.   What I did and when I did it:                                               My follow-up plan:                 Questions I want to ask:              If you have any questions about your action plan, call Verenice John [facility], Phone: [phone] , Monday-Friday 8-4:30pm.           MEDICATION LIST FOR Angeli JacobsonDILIA 1950     This medication list was made for you after we talked. We also used information from your doctor's chart.      Use blank rows to add new medications. Then fill in the dates you started using them.    Cross out medications when you no longer use them. Then write the date and why you stopped using them.    Ask your doctors, pharmacists, and other healthcare providers to update this list at every visit. Keep this list up-to-date with:       Prescription medications    Over the counter drugs     Herbals    Vitamins    Minerals      If you go to the hospital or emergency room, take this list with you. Share this with your family or caregivers too.     DATE PREPARED: 10/6/2020  Allergies or side effects: Celexa [citalopram hydrobromide]     Medication:  ALENDRONATE SODIUM 70 MG PO TABS      How I use it:  TAKE 1 TABLET BY MOUTH EVERY 7 DAYS W/8 OZ WATER 30 MIN BEFORE BREAKFAST/REMAIN UPRIGHT      Why I use it: Age-related osteoporosis    Prescriber:  Sepideh MEYER  COURTNEY Burris      Date I started using it:       Date I stopped using it:         Why I stopped using it:            Medication:  ASPIRIN 81 MG PO CHEW      How I use it:  Take 81 mg by mouth daily      Why I use it:  prevention of clot    Prescriber:  Patient Reported      Date I started using it:       Date I stopped using it:         Why I stopped using it:            Medication:  CALCIUM CARBONATE-VITAMIN D 600-400 MG-UNIT PO TABS      How I use it:  Take 1 chew tablet by mouth daily      Why I use it:  osteoporosis    Prescriber:  Patient Reported      Date I started using it:       Date I stopped using it:         Why I stopped using it:            Medication:  CLOPIDOGREL BISULFATE 75 MG PO TABS      How I use it:  Take 1 tablet (75 mg) by mouth daily      Why I use it:  clot prevention    Prescriber:  Tiesha Prince MD      Date I started using it:       Date I stopped using it:         Why I stopped using it:            Medication:  EZETIMIBE 10 MG PO TABS      How I use it:  Take 1 tablet (10 mg) by mouth daily      Why I use it: Hyperlipidemia LDL goal <70    Prescriber:  Tiesha Prince MD      Date I started using it:       Date I stopped using it:         Why I stopped using it:            Medication:  LEVOTHYROXINE SODIUM 50 MCG PO TABS      How I use it:  TAKE 1 TABLET BY MOUTH EVERY DAY      Why I use it: Hypothyroidism    Prescriber:  Tiesha Prince MD      Date I started using it:       Date I stopped using it:         Why I stopped using it:            Medication:  MELATONIN 3 MG PO TABS      How I use it:  Take 1 mg by mouth nightly as needed for sleep      Why I use it:  sleep    Prescriber:  Patient Reported      Date I started using it:       Date I stopped using it:         Why I stopped using it:            Medication:  NITROFURANTOIN MONOHYD MACRO 100 MG PO CAPS      How I use it:  Take 1 tablet after intercourse      Why I use it: Recurrent UTI     Prescriber:  Sepideh Burris PA-C      Date I started using it:       Date I stopped using it:         Why I stopped using it:                  Medication:  OXYBUTYNIN CHLORIDE ER 5 MG PO TB24      How I use it:  TAKE 1 TABLET BY MOUTH EVERY DAY      Why I use it: Mixed incontinence urge and stress (female)    Prescriber:  Sepideh Burris PA-C      Date I started using it:       Date I stopped using it:         Why I stopped using it:            Medication:  ROSUVASTATIN CALCIUM 20 MG PO TABS      How I use it:  Take 1 tablet (20 mg) by mouth daily      Why I use it: History of arterial bypass of lower extremity; Hyperlipidemia LDL goal <70    Prescriber:  Tiesha Prince MD      Date I started using it:       Date I stopped using it:         Why I stopped using it:            Medication:  CENTRUM SILVER PO TABS      How I use it:  Take 1 tablet by mouth daily      Why I use it:  dietary supplement    Prescriber:  Patient Reported      Date I started using it:       Date I stopped using it:         Why I stopped using it:            Medication:         How I use it:         Why I use it:      Prescriber:         Date I started using it:       Date I stopped using it:         Why I stopped using it:            Medication:         How I use it:         Why I use it:      Prescriber:         Date I started using it:       Date I stopped using it:         Why I stopped using it:            Medication:         How I use it:         Why I use it:      Prescriber:         Date I started using it:       Date I stopped using it:         Why I stopped using it:              Other Information:     If you have any questions about your action plan, call [phone].    According to the Paperwork Reduction Act of 1995, no persons are required to respond to a collection of information unless it displays a valid OMB control number. The valid B number for this information collection is 9825-9644. The time required  to complete this information collection is estimated to average 40 minutes per response, including the time to review instructions, searching existing data resources, gather the data needed, and complete and review the information collection. If you have any comments concerning the accuracy of the time estimate(s) or suggestions for improving this form, please write to: CMS, Attn: PRA Reports Clearance Officer, [address] [place] [id number].

## 2020-10-07 ENCOUNTER — TELEPHONE (OUTPATIENT)
Dept: UROLOGY | Facility: CLINIC | Age: 70
End: 2020-10-07

## 2020-10-07 DIAGNOSIS — N32.81 OAB (OVERACTIVE BLADDER): Primary | ICD-10-CM

## 2020-10-07 RX ORDER — MIRABEGRON 25 MG/1
25 TABLET, FILM COATED, EXTENDED RELEASE ORAL DAILY
Qty: 30 TABLET | Refills: 3 | Status: SHIPPED | OUTPATIENT
Start: 2020-10-07 | End: 2020-12-19

## 2020-10-07 NOTE — TELEPHONE ENCOUNTER
Dr wen ordered a new medication mirabegon 25 mg daily with 3 refills  Sent to her [facility] pharmacy in [place] and she wants her to see nurse in 8 weeks to get pvr and bp check and notify her of the results and see her back in jan 7th at 9:45 am video chat about symptoms Carolyn Tang LPN Staff Nurse

## 2020-10-08 ENCOUNTER — HOSPITAL ENCOUNTER (OUTPATIENT)
Dept: MAMMOGRAPHY | Facility: CLINIC | Age: 70
Discharge: HOME | End: 2020-10-08
Attending: PHYSICIAN ASSISTANT | Admitting: PHYSICIAN ASSISTANT
Payer: COMMERCIAL

## 2020-10-08 DIAGNOSIS — Z12.31 ENCOUNTER FOR SCREENING MAMMOGRAM FOR BREAST CANCER: ICD-10-CM

## 2020-10-08 PROCEDURE — 77063 BREAST TOMOSYNTHESIS BI: CPT

## 2020-10-19 ENCOUNTER — HOSPITAL ENCOUNTER (OUTPATIENT)
Dept: OCCUPATIONAL THERAPY | Facility: CLINIC | Age: 70
Setting detail: THERAPIES SERIES
End: 2020-10-19
Attending: ORTHOPAEDIC SURGERY
Payer: COMMERCIAL

## 2020-10-19 PROCEDURE — 97140 MANUAL THERAPY 1/> REGIONS: CPT | Mod: GO | Performed by: OCCUPATIONAL THERAPIST

## 2020-10-20 ENCOUNTER — HOSPITAL ENCOUNTER (OUTPATIENT)
Dept: OCCUPATIONAL THERAPY | Facility: CLINIC | Age: 70
Setting detail: THERAPIES SERIES
End: 2020-10-20
Attending: ORTHOPAEDIC SURGERY
Payer: COMMERCIAL

## 2020-10-20 PROCEDURE — 97140 MANUAL THERAPY 1/> REGIONS: CPT | Mod: GO | Performed by: OCCUPATIONAL THERAPIST

## 2020-10-21 ENCOUNTER — HOSPITAL ENCOUNTER (OUTPATIENT)
Dept: OCCUPATIONAL THERAPY | Facility: CLINIC | Age: 70
Setting detail: THERAPIES SERIES
End: 2020-10-21
Attending: ORTHOPAEDIC SURGERY
Payer: COMMERCIAL

## 2020-10-21 PROCEDURE — 97140 MANUAL THERAPY 1/> REGIONS: CPT | Mod: GO | Performed by: OCCUPATIONAL THERAPIST

## 2020-10-22 ENCOUNTER — HOSPITAL ENCOUNTER (OUTPATIENT)
Dept: OCCUPATIONAL THERAPY | Facility: CLINIC | Age: 70
Setting detail: THERAPIES SERIES
End: 2020-10-22
Attending: ORTHOPAEDIC SURGERY
Payer: COMMERCIAL

## 2020-10-22 PROCEDURE — 97140 MANUAL THERAPY 1/> REGIONS: CPT | Mod: GO | Performed by: OCCUPATIONAL THERAPIST

## 2020-10-23 ENCOUNTER — HOSPITAL ENCOUNTER (OUTPATIENT)
Dept: OCCUPATIONAL THERAPY | Facility: CLINIC | Age: 70
Setting detail: THERAPIES SERIES
End: 2020-10-23
Attending: ORTHOPAEDIC SURGERY
Payer: COMMERCIAL

## 2020-10-23 PROCEDURE — 97140 MANUAL THERAPY 1/> REGIONS: CPT | Mod: GO | Performed by: OCCUPATIONAL THERAPIST

## 2020-10-26 ENCOUNTER — HOSPITAL ENCOUNTER (OUTPATIENT)
Dept: OCCUPATIONAL THERAPY | Facility: CLINIC | Age: 70
Setting detail: THERAPIES SERIES
End: 2020-10-26
Attending: ORTHOPAEDIC SURGERY
Payer: COMMERCIAL

## 2020-10-26 PROCEDURE — 97140 MANUAL THERAPY 1/> REGIONS: CPT | Mod: GO | Performed by: OCCUPATIONAL THERAPIST

## 2020-10-27 ENCOUNTER — ALLIED HEALTH/NURSE VISIT (OUTPATIENT)
Dept: FAMILY MEDICINE | Facility: CLINIC | Age: 70
End: 2020-10-27
Payer: COMMERCIAL

## 2020-10-27 ENCOUNTER — HOSPITAL ENCOUNTER (OUTPATIENT)
Dept: OCCUPATIONAL THERAPY | Facility: CLINIC | Age: 70
Setting detail: THERAPIES SERIES
End: 2020-10-27
Attending: ORTHOPAEDIC SURGERY
Payer: COMMERCIAL

## 2020-10-27 VITALS — SYSTOLIC BLOOD PRESSURE: 122 MMHG | DIASTOLIC BLOOD PRESSURE: 78 MMHG

## 2020-10-27 DIAGNOSIS — Z01.30 BP CHECK: Primary | ICD-10-CM

## 2020-10-27 PROCEDURE — 99207 PR NO CHARGE NURSE ONLY: CPT | Performed by: PHYSICIAN ASSISTANT

## 2020-10-27 PROCEDURE — 97140 MANUAL THERAPY 1/> REGIONS: CPT | Mod: GO | Performed by: OCCUPATIONAL THERAPIST

## 2020-10-27 NOTE — PROGRESS NOTES
Angeli Jacobson was evaluated at [facility] on October 27, 2020 at which time her blood pressure was:    BP Readings from Last 3 Encounters:   10/27/20 122/78   09/22/20 108/70   09/09/20 113/76     Pulse Readings from Last 3 Encounters:   09/22/20 77   09/09/20 86   08/31/20 93       Reviewed lifestyle modifications for blood pressure control and reduction: including making healthy food choices, managing weight, getting regular exercise, smoking cessation, reducing alcohol consumption, monitoring blood pressure regularly.     Symptoms: None    BP Goal:< 140/90 mmHg    BP Assessment:  BP at goal    Potential Reasons for BP too high: NA - Not applicable    BP Follow-Up Plan: Recheck BP in 6 months at pharmacy    Recommendation to Provider: n/a    Note completed by: Thank you!    Cristel Rodriguez, PharmD  Float Pharmacist  [facility]    on behalf of: [facility].

## 2020-10-28 ENCOUNTER — HOSPITAL ENCOUNTER (OUTPATIENT)
Dept: OCCUPATIONAL THERAPY | Facility: CLINIC | Age: 70
Setting detail: THERAPIES SERIES
End: 2020-10-28
Attending: ORTHOPAEDIC SURGERY
Payer: COMMERCIAL

## 2020-10-28 PROCEDURE — 97140 MANUAL THERAPY 1/> REGIONS: CPT | Mod: GO | Performed by: OCCUPATIONAL THERAPIST

## 2020-10-29 ENCOUNTER — HOSPITAL ENCOUNTER (OUTPATIENT)
Dept: OCCUPATIONAL THERAPY | Facility: CLINIC | Age: 70
Setting detail: THERAPIES SERIES
End: 2020-10-29
Attending: ORTHOPAEDIC SURGERY
Payer: COMMERCIAL

## 2020-10-29 PROCEDURE — 97140 MANUAL THERAPY 1/> REGIONS: CPT | Mod: GO | Performed by: OCCUPATIONAL THERAPIST

## 2020-10-29 NOTE — PROGRESS NOTES
Outpatient Occupational Therapy Progress Note     Patient: Angeli Jacobson  : 1950    Beginning/End Dates of Reporting Period:  20 to 10/29/2020    Referring Provider: MAURI Peña/C    Therapy Diagnosis: Lymphedema    Client Self Report:   Pt is reporting the leg is softer.    Objective Measurements:    Knee flex is 68*.  Her girth reduced 43% of total swelling or 641mL.  Now L>R 14% last msmt on 10/23.      Outcome Measures (most recent score):   at d/c    Goals:   Goal Identifier 1   Goal Description Pt will be independent with home program modifications.   Target Date 20   Date Met      Progress:goal in progress     Goal Identifier 2   Goal Description Pt will decrease the tightness and fullness in the leg 10 % of total swelling to clear stairs easier.   Target Date 20   Date Met      Progress:goal in  progress     Goal Identifier 3   Goal Description Pt will be able to flex knee to 90* with less fibrosis in the knee.   Target Date 20   Date Met      Progress:goal inprogress   Progress Toward Goals:   Progress this reporting period: Pt has been seen for 10 sessions since eval.  She is making good progress.  We are now working on breaking fibrosis to decrease girth and increase flex of knee.  She has much weakness in the L leg and may return to PT for this.  She is wrapping every day and has made adjustments to foot compression as it was filling with the modified compression.      Plan:  Continue therapy per current plan of care.    Discharge:  No

## 2020-10-30 ENCOUNTER — HOSPITAL ENCOUNTER (OUTPATIENT)
Dept: OCCUPATIONAL THERAPY | Facility: CLINIC | Age: 70
Setting detail: THERAPIES SERIES
End: 2020-10-30
Attending: ORTHOPAEDIC SURGERY
Payer: COMMERCIAL

## 2020-10-30 PROCEDURE — 97140 MANUAL THERAPY 1/> REGIONS: CPT | Mod: GO | Performed by: OCCUPATIONAL THERAPIST

## 2020-11-03 ENCOUNTER — HOSPITAL ENCOUNTER (OUTPATIENT)
Dept: OCCUPATIONAL THERAPY | Facility: CLINIC | Age: 70
Setting detail: THERAPIES SERIES
End: 2020-11-03
Attending: ORTHOPAEDIC SURGERY
Payer: COMMERCIAL

## 2020-11-03 PROCEDURE — 97140 MANUAL THERAPY 1/> REGIONS: CPT | Mod: GO | Performed by: OCCUPATIONAL THERAPIST

## 2020-11-04 ENCOUNTER — HOSPITAL ENCOUNTER (OUTPATIENT)
Dept: OCCUPATIONAL THERAPY | Facility: CLINIC | Age: 70
Setting detail: THERAPIES SERIES
End: 2020-11-04
Attending: ORTHOPAEDIC SURGERY
Payer: COMMERCIAL

## 2020-11-04 PROCEDURE — 97140 MANUAL THERAPY 1/> REGIONS: CPT | Mod: GO | Performed by: OCCUPATIONAL THERAPIST

## 2020-11-05 ENCOUNTER — HOSPITAL ENCOUNTER (OUTPATIENT)
Dept: OCCUPATIONAL THERAPY | Facility: CLINIC | Age: 70
Setting detail: THERAPIES SERIES
End: 2020-11-05
Attending: ORTHOPAEDIC SURGERY
Payer: COMMERCIAL

## 2020-11-05 PROCEDURE — 97140 MANUAL THERAPY 1/> REGIONS: CPT | Mod: GO | Performed by: OCCUPATIONAL THERAPIST

## 2020-11-06 ENCOUNTER — HOSPITAL ENCOUNTER (OUTPATIENT)
Dept: OCCUPATIONAL THERAPY | Facility: CLINIC | Age: 70
Setting detail: THERAPIES SERIES
End: 2020-11-06
Attending: ORTHOPAEDIC SURGERY
Payer: COMMERCIAL

## 2020-11-06 PROCEDURE — 97140 MANUAL THERAPY 1/> REGIONS: CPT | Mod: GO | Performed by: OCCUPATIONAL THERAPIST

## 2020-11-09 ENCOUNTER — MYC MEDICAL ADVICE (OUTPATIENT)
Dept: FAMILY MEDICINE | Facility: CLINIC | Age: 70
End: 2020-11-09

## 2020-11-10 ENCOUNTER — MYC MEDICAL ADVICE (OUTPATIENT)
Dept: FAMILY MEDICINE | Facility: CLINIC | Age: 70
End: 2020-11-10

## 2020-11-10 ENCOUNTER — THERAPY VISIT (OUTPATIENT)
Dept: PHYSICAL THERAPY | Facility: CLINIC | Age: 70
End: 2020-11-10
Payer: COMMERCIAL

## 2020-11-10 DIAGNOSIS — Z96.642 STATUS POST TOTAL REPLACEMENT OF LEFT HIP: ICD-10-CM

## 2020-11-10 PROCEDURE — 97110 THERAPEUTIC EXERCISES: CPT | Mod: GP | Performed by: PHYSICAL THERAPIST

## 2020-11-10 PROCEDURE — 97161 PT EVAL LOW COMPLEX 20 MIN: CPT | Mod: GP | Performed by: PHYSICAL THERAPIST

## 2020-11-10 ASSESSMENT — ACTIVITIES OF DAILY LIVING (ADL)
PUTTING_ON_SOCKS_AND_SHOES: MODERATE DIFFICULTY
LIGHT_TO_MODERATE_WORK: SLIGHT DIFFICULTY
GETTING_INTO_AND_OUT_OF_AN_AVERAGE_CAR: MODERATE DIFFICULTY
GOING_DOWN_1_FLIGHT_OF_STAIRS: MODERATE DIFFICULTY
WALKING_APPROXIMATELY_10_MINUTES: SLIGHT DIFFICULTY
ROLLING_OVER_IN_BED: MODERATE DIFFICULTY
HOS_ADL_HIGHEST_POTENTIAL_SCORE: 60
HOS_ADL_ITEM_SCORE_TOTAL: 29
DEEP_SQUATTING: UNABLE TO DO
SITTING_FOR_15_MINUTES: NO DIFFICULTY AT ALL
WALKING_15_MINUTES_OR_GREATER: MODERATE DIFFICULTY
GOING_UP_1_FLIGHT_OF_STAIRS: MODERATE DIFFICULTY
HEAVY_WORK: UNABLE TO DO
HOS_ADL_COUNT: 15
RECREATIONAL_ACTIVITIES: UNABLE TO DO
STEPPING_UP_AND_DOWN_CURBS: SLIGHT DIFFICULTY
STANDING_FOR_15_MINUTES: NO DIFFICULTY AT ALL
WALKING_INITIALLY: MODERATE DIFFICULTY
HOW_WOULD_YOU_RATE_YOUR_CURRENT_LEVEL_OF_FUNCTION_DURING_YOUR_USUAL_ACTIVITIES_OF_DAILY_LIVING_FROM_0_TO_100_WITH_100_BEING_YOUR_LEVEL_OF_FUNCTION_PRIOR_TO_YOUR_HIP_PROBLEM_AND_0_BEING_THE_INABILITY_TO_PERFORM_ANY_OF_YOUR_USUAL_DAILY_ACTIVITIES?: 75
HOS_ADL_SCORE(%): 48.33
TWISTING/PIVOTING_ON_INVOLVED_LEG: NO DIFFICULTY AT ALL
GETTING_INTO_AND_OUT_OF_A_BATHTUB: UNABLE TO DO

## 2020-11-10 NOTE — TELEPHONE ENCOUNTER
Sepideh Burris PA-C  [facility]. Would you like to have pt talk with a MTM?  Althea Mccloud RN
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
## 2020-11-10 NOTE — TELEPHONE ENCOUNTER
Sepideh Burris PA-C    Please review patient my chart response     [place] Registered Nurse, PAL (Patient Advocate Liason)   [facility]   [phone] '   The skin at the access site was anesthetized.  Using an angiocath with the Modified Seldinger technique the right radial artery was succesfully accessed in a retrograde fashion over the guidewire using a Ss Kit 6fr 10cm, Flex Straight 0.021in X 45cm.

## 2020-11-11 DIAGNOSIS — N32.81 OAB (OVERACTIVE BLADDER): Primary | ICD-10-CM

## 2020-11-11 RX ORDER — FESOTERODINE FUMARATE 4 MG/1
4 TABLET, FILM COATED, EXTENDED RELEASE ORAL DAILY
Qty: 30 TABLET | Refills: 3 | Status: SHIPPED | OUTPATIENT
Start: 2020-11-11 | End: 2021-01-04

## 2020-11-11 NOTE — TELEPHONE ENCOUNTER
Huddled with provider pt should get in touch with urology since that is who is managing her oxybutin.  Althea Mccloud RN

## 2020-11-18 ENCOUNTER — TELEPHONE (OUTPATIENT)
Dept: OTHER | Facility: CLINIC | Age: 70
End: 2020-11-18

## 2020-11-18 ENCOUNTER — THERAPY VISIT (OUTPATIENT)
Dept: PHYSICAL THERAPY | Facility: CLINIC | Age: 70
End: 2020-11-18
Payer: COMMERCIAL

## 2020-11-18 DIAGNOSIS — Z96.642 STATUS POST TOTAL REPLACEMENT OF LEFT HIP: ICD-10-CM

## 2020-11-18 PROCEDURE — 97530 THERAPEUTIC ACTIVITIES: CPT | Mod: GP | Performed by: PHYSICAL THERAPIST

## 2020-11-18 PROCEDURE — 97110 THERAPEUTIC EXERCISES: CPT | Mod: GP | Performed by: PHYSICAL THERAPIST

## 2020-11-18 NOTE — TELEPHONE ENCOUNTER
Rx request received via fax from [facility] for thigh high compression garments.    Rx signed by Dr. Prince, faxed to [phone]    Rightfax: 11/18/20 3:06  Barbara Greenwood RN BSN  [facility]  [phone]

## 2020-11-30 ENCOUNTER — PRE VISIT (OUTPATIENT)
Dept: UROLOGY | Facility: CLINIC | Age: 70
End: 2020-11-30

## 2020-12-02 ENCOUNTER — TELEPHONE (OUTPATIENT)
Dept: UROLOGY | Facility: CLINIC | Age: 70
End: 2020-12-02

## 2020-12-02 NOTE — TELEPHONE ENCOUNTER
[facility] Call Center    Phone Message    May a detailed message be left on voicemail: yes     Reason for Call: Other: Pt returning Carolyn's call, would like a call back today if possible.     Action Taken: Message routed to:  [facility] urology    Travel Screening: Not Applicable

## 2020-12-02 NOTE — TELEPHONE ENCOUNTER
[facility] Call Center    Phone Message    May a detailed message be left on voicemail: yes     Reason for Call: Other: Pt not comfortable coming in to clinic on friday, would like a call back to discuss how essential this is and if she can not come in. Also reports that she is video unable and would like to do a telephone call visit on 1/7 with . Please call back to discuss.     Action Taken: Message routed to:  [facility] urology    Travel Screening: Not Applicable

## 2020-12-02 NOTE — TELEPHONE ENCOUNTER
Waiting for dr wen to ok that I want to cancel her nurse appt on Friday Carolyn Tang LPN Staff Nurse

## 2020-12-03 ENCOUNTER — THERAPY VISIT (OUTPATIENT)
Dept: PHYSICAL THERAPY | Facility: CLINIC | Age: 70
End: 2020-12-03
Payer: COMMERCIAL

## 2020-12-03 DIAGNOSIS — Z96.642 STATUS POST TOTAL REPLACEMENT OF LEFT HIP: ICD-10-CM

## 2020-12-03 PROCEDURE — 97110 THERAPEUTIC EXERCISES: CPT | Mod: GP | Performed by: PHYSICAL THERAPIST

## 2020-12-03 PROCEDURE — 97112 NEUROMUSCULAR REEDUCATION: CPT | Mod: GP | Performed by: PHYSICAL THERAPIST

## 2020-12-03 NOTE — TELEPHONE ENCOUNTER
Patient states that her bladder symptoms have improved and her BP's that she had done at her local pharmacy have been normal.    Patient was notified that she can cancel her nurse visit appointment and post pone her PVR per Dr. Jennifer Oquendo.      Houston Sena MA

## 2020-12-03 NOTE — TELEPHONE ENCOUNTER
If her bladder symptoms are improved and she feels that she is emptying well then we can postpone the PVR     Does she have access to a BP cuff at home?     Thanks    Dr. Jennifer Oquendo

## 2020-12-15 ENCOUNTER — HOSPITAL ENCOUNTER (OUTPATIENT)
Dept: OCCUPATIONAL THERAPY | Facility: CLINIC | Age: 70
Setting detail: THERAPIES SERIES
End: 2020-12-15
Attending: ORTHOPAEDIC SURGERY
Payer: COMMERCIAL

## 2020-12-15 PROCEDURE — 97140 MANUAL THERAPY 1/> REGIONS: CPT | Mod: GO | Performed by: OCCUPATIONAL THERAPIST

## 2020-12-16 NOTE — PROGRESS NOTES
[facility]      OUTPATIENT OCCUPATIONAL THERAPY  PLAN OF TREATMENT FOR OUTPATIENT REHABILITATION    Patient's Last Name, First Name, M.I.                YOB: 1950  Angeli Jacobson                        Provider's Name  [facility] Medical Record No.  [id number]                               Onset Date: 9/17/18   Start of Care Date: 9/17/20   Type:     ___PT   _X_OT   ___SLP Medical Diagnosis: Lymphedema                       OT Diagnosis: Lymphedema      _________________________________________________________________________________  Plan of Treatment:    Frequency/Duration: 0x/week for one week, one time a week for one week.   0x/week for 10 weeks, then once a week for one week.  ( this appointment would prefer around 1/5, but next available is 1/12 and they are leaving [facility] for winter returning 2/21.     Goals:  Goal Identifier 1   Goal Description Pt will be independent with home program modifications.   Target Date 3/1/21   Date Met     Progress:goal in progress, home program needs adjustment in lower calf to contain reduction here.     Goal Identifier 2   Goal Description Pt will decrease the tightness and fullness in the leg 10 % of total swelling to clear stairs easier.   Target Date 12/04/20   Date Met  11/06/20   Progress:goal met     Goal Identifier 3   Goal Description Pt will be able to flex knee to 90* with less fibrosis in the knee.   Target Date 12/04/20   Date Met      Progress:goal not met - is now being seen by PT       Progress Toward Goals:   Progress this reporting period: Please see last PN 10/29.  She has been seen for 5 intensive sessions and a follow up since this time.  She reduced to L>R 5% or a 59% reduction from SOC.  The L leg has cont it reduction except for an inch bigger in the lower calf.  We will now change the bandaging to make  this tighter as the bandages are not fully reducing here and it crept back over time.  She will be seen for follow up, but if still larger may tighten her garment to 3F in the calf, keeping the thigh at 3.  Only elvarex does this combination and may need to switch brands.  She is independent with home program, flexitouch, wrapping, garments.  She has returned to PT for better strength and flexibilty.      Certification date from 12/5/20 to 3/1/21.    Amy Guzman OT          I CERTIFY THE NEED FOR THESE SERVICES FURNISHED UNDER        THIS PLAN OF TREATMENT AND WHILE UNDER MY CARE     (Physician co-signature of this document indicates review and certification of the therapy plan).                Referring Provider: Sepideh Burris PA-C

## 2020-12-17 ENCOUNTER — THERAPY VISIT (OUTPATIENT)
Dept: PHYSICAL THERAPY | Facility: CLINIC | Age: 70
End: 2020-12-17
Payer: COMMERCIAL

## 2020-12-17 DIAGNOSIS — Z96.642 STATUS POST TOTAL REPLACEMENT OF LEFT HIP: ICD-10-CM

## 2020-12-17 PROCEDURE — 97112 NEUROMUSCULAR REEDUCATION: CPT | Mod: GP | Performed by: PHYSICAL THERAPIST

## 2020-12-17 PROCEDURE — 97110 THERAPEUTIC EXERCISES: CPT | Mod: GP | Performed by: PHYSICAL THERAPIST

## 2020-12-19 ENCOUNTER — HOSPITAL ENCOUNTER (OUTPATIENT)
Facility: CLINIC | Age: 70
Setting detail: OBSERVATION
Discharge: HOME | End: 2020-12-23
Attending: EMERGENCY MEDICINE | Admitting: INTERNAL MEDICINE
Payer: COMMERCIAL

## 2020-12-19 ENCOUNTER — APPOINTMENT (OUTPATIENT)
Dept: CT IMAGING | Facility: CLINIC | Age: 70
End: 2020-12-19
Attending: EMERGENCY MEDICINE
Payer: COMMERCIAL

## 2020-12-19 ENCOUNTER — APPOINTMENT (OUTPATIENT)
Dept: GENERAL RADIOLOGY | Facility: CLINIC | Age: 70
End: 2020-12-19
Attending: EMERGENCY MEDICINE
Payer: COMMERCIAL

## 2020-12-19 DIAGNOSIS — S82.402A CLOSED FRACTURE OF LEFT TIBIA AND FIBULA, INITIAL ENCOUNTER: ICD-10-CM

## 2020-12-19 DIAGNOSIS — M81.0 AGE-RELATED OSTEOPOROSIS WITHOUT CURRENT PATHOLOGICAL FRACTURE: ICD-10-CM

## 2020-12-19 DIAGNOSIS — S82.002A CLOSED DISPLACED FRACTURE OF LEFT PATELLA, UNSPECIFIED FRACTURE MORPHOLOGY, INITIAL ENCOUNTER: ICD-10-CM

## 2020-12-19 DIAGNOSIS — N30.00 ACUTE CYSTITIS WITHOUT HEMATURIA: Primary | ICD-10-CM

## 2020-12-19 DIAGNOSIS — S82.202A CLOSED FRACTURE OF LEFT TIBIA AND FIBULA, INITIAL ENCOUNTER: ICD-10-CM

## 2020-12-19 LAB
ANION GAP SERPL CALCULATED.3IONS-SCNC: 2 MMOL/L (ref 3–14)
BASOPHILS # BLD AUTO: 0 10E9/L (ref 0–0.2)
BASOPHILS NFR BLD AUTO: 0.3 %
BUN SERPL-MCNC: 16 MG/DL (ref 7–30)
CALCIUM SERPL-MCNC: 9.2 MG/DL (ref 8.5–10.1)
CHLORIDE SERPL-SCNC: 107 MMOL/L (ref 94–109)
CO2 SERPL-SCNC: 31 MMOL/L (ref 20–32)
CREAT SERPL-MCNC: 0.54 MG/DL (ref 0.52–1.04)
DIFFERENTIAL METHOD BLD: ABNORMAL
EOSINOPHIL # BLD AUTO: 0.1 10E9/L (ref 0–0.7)
EOSINOPHIL NFR BLD AUTO: 0.5 %
ERYTHROCYTE [DISTWIDTH] IN BLOOD BY AUTOMATED COUNT: 14 % (ref 10–15)
GFR SERPL CREATININE-BSD FRML MDRD: >90 ML/MIN/{1.73_M2}
GLUCOSE SERPL-MCNC: 133 MG/DL (ref 70–99)
HCT VFR BLD AUTO: 39.3 % (ref 35–47)
HGB BLD-MCNC: 12.2 G/DL (ref 11.7–15.7)
IMM GRANULOCYTES # BLD: 0.1 10E9/L (ref 0–0.4)
IMM GRANULOCYTES NFR BLD: 0.5 %
LABORATORY COMMENT REPORT: NORMAL
LYMPHOCYTES # BLD AUTO: 1.9 10E9/L (ref 0.8–5.3)
LYMPHOCYTES NFR BLD AUTO: 14.6 %
MCH RBC QN AUTO: 29.3 PG (ref 26.5–33)
MCHC RBC AUTO-ENTMCNC: 31 G/DL (ref 31.5–36.5)
MCV RBC AUTO: 95 FL (ref 78–100)
MONOCYTES # BLD AUTO: 0.7 10E9/L (ref 0–1.3)
MONOCYTES NFR BLD AUTO: 5.2 %
NEUTROPHILS # BLD AUTO: 10.1 10E9/L (ref 1.6–8.3)
NEUTROPHILS NFR BLD AUTO: 78.9 %
NRBC # BLD AUTO: 0 10*3/UL
NRBC BLD AUTO-RTO: 0 /100
PLATELET # BLD AUTO: 283 10E9/L (ref 150–450)
POTASSIUM SERPL-SCNC: 4.1 MMOL/L (ref 3.4–5.3)
RBC # BLD AUTO: 4.16 10E12/L (ref 3.8–5.2)
SARS-COV-2 RNA SPEC QL NAA+PROBE: NEGATIVE
SARS-COV-2 RNA SPEC QL NAA+PROBE: NORMAL
SODIUM SERPL-SCNC: 140 MMOL/L (ref 133–144)
SPECIMEN SOURCE: NORMAL
SPECIMEN SOURCE: NORMAL
WBC # BLD AUTO: 12.8 10E9/L (ref 4–11)

## 2020-12-19 PROCEDURE — 250N000011 HC RX IP 250 OP 636: Performed by: EMERGENCY MEDICINE

## 2020-12-19 PROCEDURE — U0003 INFECTIOUS AGENT DETECTION BY NUCLEIC ACID (DNA OR RNA); SEVERE ACUTE RESPIRATORY SYNDROME CORONAVIRUS 2 (SARS-COV-2) (CORONAVIRUS DISEASE [COVID-19]), AMPLIFIED PROBE TECHNIQUE, MAKING USE OF HIGH THROUGHPUT TECHNOLOGIES AS DESCRIBED BY CMS-2020-01-R: HCPCS | Performed by: EMERGENCY MEDICINE

## 2020-12-19 PROCEDURE — 80048 BASIC METABOLIC PNL TOTAL CA: CPT | Performed by: EMERGENCY MEDICINE

## 2020-12-19 PROCEDURE — 36415 COLL VENOUS BLD VENIPUNCTURE: CPT | Performed by: EMERGENCY MEDICINE

## 2020-12-19 PROCEDURE — 73562 X-RAY EXAM OF KNEE 3: CPT | Mod: LT

## 2020-12-19 PROCEDURE — 96375 TX/PRO/DX INJ NEW DRUG ADDON: CPT

## 2020-12-19 PROCEDURE — 70450 CT HEAD/BRAIN W/O DYE: CPT

## 2020-12-19 PROCEDURE — 86901 BLOOD TYPING SEROLOGIC RH(D): CPT | Performed by: EMERGENCY MEDICINE

## 2020-12-19 PROCEDURE — 73590 X-RAY EXAM OF LOWER LEG: CPT | Mod: LT

## 2020-12-19 PROCEDURE — 250N000013 HC RX MED GY IP 250 OP 250 PS 637: Performed by: INTERNAL MEDICINE

## 2020-12-19 PROCEDURE — 96374 THER/PROPH/DIAG INJ IV PUSH: CPT

## 2020-12-19 PROCEDURE — 99285 EMERGENCY DEPT VISIT HI MDM: CPT | Mod: 25

## 2020-12-19 PROCEDURE — 250N000013 HC RX MED GY IP 250 OP 250 PS 637: Performed by: PHYSICIAN ASSISTANT

## 2020-12-19 PROCEDURE — 86923 COMPATIBILITY TEST ELECTRIC: CPT | Performed by: EMERGENCY MEDICINE

## 2020-12-19 PROCEDURE — 86850 RBC ANTIBODY SCREEN: CPT | Performed by: EMERGENCY MEDICINE

## 2020-12-19 PROCEDURE — 73552 X-RAY EXAM OF FEMUR 2/>: CPT | Mod: LT

## 2020-12-19 PROCEDURE — 93005 ELECTROCARDIOGRAM TRACING: CPT

## 2020-12-19 PROCEDURE — 120N000001 HC R&B MED SURG/OB

## 2020-12-19 PROCEDURE — 99223 1ST HOSP IP/OBS HIGH 75: CPT | Mod: AI | Performed by: INTERNAL MEDICINE

## 2020-12-19 PROCEDURE — 86900 BLOOD TYPING SEROLOGIC ABO: CPT | Performed by: EMERGENCY MEDICINE

## 2020-12-19 PROCEDURE — C9803 HOPD COVID-19 SPEC COLLECT: HCPCS

## 2020-12-19 PROCEDURE — 85025 COMPLETE CBC W/AUTO DIFF WBC: CPT | Performed by: EMERGENCY MEDICINE

## 2020-12-19 RX ORDER — LIDOCAINE 40 MG/G
CREAM TOPICAL
Status: DISCONTINUED | OUTPATIENT
Start: 2020-12-19 | End: 2020-12-21

## 2020-12-19 RX ORDER — FENTANYL CITRATE 50 UG/ML
50 INJECTION, SOLUTION INTRAMUSCULAR; INTRAVENOUS ONCE
Status: COMPLETED | OUTPATIENT
Start: 2020-12-19 | End: 2020-12-19

## 2020-12-19 RX ORDER — ONDANSETRON 2 MG/ML
4 INJECTION INTRAMUSCULAR; INTRAVENOUS EVERY 6 HOURS PRN
Status: DISCONTINUED | OUTPATIENT
Start: 2020-12-19 | End: 2020-12-21

## 2020-12-19 RX ORDER — NALOXONE HYDROCHLORIDE 0.4 MG/ML
0.4 INJECTION, SOLUTION INTRAMUSCULAR; INTRAVENOUS; SUBCUTANEOUS
Status: DISCONTINUED | OUTPATIENT
Start: 2020-12-19 | End: 2020-12-23 | Stop reason: HOSPADM

## 2020-12-19 RX ORDER — HYDROMORPHONE HYDROCHLORIDE 1 MG/ML
.3-.5 INJECTION, SOLUTION INTRAMUSCULAR; INTRAVENOUS; SUBCUTANEOUS
Status: DISCONTINUED | OUTPATIENT
Start: 2020-12-19 | End: 2020-12-21

## 2020-12-19 RX ORDER — SODIUM CHLORIDE 9 MG/ML
INJECTION, SOLUTION INTRAVENOUS CONTINUOUS
Status: DISCONTINUED | OUTPATIENT
Start: 2020-12-20 | End: 2020-12-21

## 2020-12-19 RX ORDER — LEVOTHYROXINE SODIUM 50 UG/1
50 TABLET ORAL
Status: DISCONTINUED | OUTPATIENT
Start: 2020-12-20 | End: 2020-12-23 | Stop reason: HOSPADM

## 2020-12-19 RX ORDER — TOLTERODINE 2 MG/1
2 CAPSULE, EXTENDED RELEASE ORAL EVERY EVENING
Status: DISCONTINUED | OUTPATIENT
Start: 2020-12-19 | End: 2020-12-23 | Stop reason: HOSPADM

## 2020-12-19 RX ORDER — NALOXONE HYDROCHLORIDE 0.4 MG/ML
0.2 INJECTION, SOLUTION INTRAMUSCULAR; INTRAVENOUS; SUBCUTANEOUS
Status: DISCONTINUED | OUTPATIENT
Start: 2020-12-19 | End: 2020-12-23 | Stop reason: HOSPADM

## 2020-12-19 RX ORDER — MIRABEGRON 25 MG/1
25 TABLET, FILM COATED, EXTENDED RELEASE ORAL DAILY PRN
COMMUNITY
End: 2021-02-05

## 2020-12-19 RX ORDER — MULTIVITAMIN,THERAPEUTIC
1 TABLET ORAL DAILY
Status: DISCONTINUED | OUTPATIENT
Start: 2020-12-20 | End: 2020-12-23 | Stop reason: HOSPADM

## 2020-12-19 RX ORDER — ONDANSETRON 4 MG/1
4 TABLET, ORALLY DISINTEGRATING ORAL EVERY 6 HOURS PRN
Status: DISCONTINUED | OUTPATIENT
Start: 2020-12-19 | End: 2020-12-21

## 2020-12-19 RX ORDER — MIRABEGRON 25 MG/1
25 TABLET, FILM COATED, EXTENDED RELEASE ORAL DAILY
Status: DISCONTINUED | OUTPATIENT
Start: 2020-12-19 | End: 2020-12-23 | Stop reason: HOSPADM

## 2020-12-19 RX ORDER — ONDANSETRON 2 MG/ML
4 INJECTION INTRAMUSCULAR; INTRAVENOUS ONCE
Status: COMPLETED | OUTPATIENT
Start: 2020-12-19 | End: 2020-12-19

## 2020-12-19 RX ORDER — EZETIMIBE 10 MG/1
10 TABLET ORAL DAILY
Status: DISCONTINUED | OUTPATIENT
Start: 2020-12-20 | End: 2020-12-23 | Stop reason: HOSPADM

## 2020-12-19 RX ORDER — ROSUVASTATIN CALCIUM 5 MG/1
20 TABLET, COATED ORAL EVERY EVENING
Status: DISCONTINUED | OUTPATIENT
Start: 2020-12-19 | End: 2020-12-23 | Stop reason: HOSPADM

## 2020-12-19 RX ORDER — ACETAMINOPHEN 325 MG/1
650 TABLET ORAL EVERY 4 HOURS PRN
Status: DISCONTINUED | OUTPATIENT
Start: 2020-12-19 | End: 2020-12-21

## 2020-12-19 RX ADMIN — ROSUVASTATIN CALCIUM 20 MG: 5 TABLET, FILM COATED ORAL at 20:18

## 2020-12-19 RX ADMIN — ACETAMINOPHEN 650 MG: 325 TABLET, FILM COATED ORAL at 20:25

## 2020-12-19 RX ADMIN — FENTANYL CITRATE 50 MCG: 50 INJECTION, SOLUTION INTRAMUSCULAR; INTRAVENOUS at 14:02

## 2020-12-19 RX ADMIN — ONDANSETRON 4 MG: 2 INJECTION INTRAMUSCULAR; INTRAVENOUS at 15:26

## 2020-12-19 RX ADMIN — TOLTERODINE TARTRATE 2 MG: 2 CAPSULE, EXTENDED RELEASE ORAL at 20:18

## 2020-12-19 ASSESSMENT — ENCOUNTER SYMPTOMS
NECK PAIN: 0
BACK PAIN: 0
VOMITING: 0
HEADACHES: 0
NAUSEA: 0

## 2020-12-19 ASSESSMENT — ACTIVITIES OF DAILY LIVING (ADL): ADLS_ACUITY_SCORE: 14

## 2020-12-19 NOTE — ED PROVIDER NOTES
History   Chief Complaint:  Fall     HPI   Angeli Jacobson is a 70 year old female with history of lymphedema and left hip arthroscopy who presents via EMS after a fall. She states she fell in her driveway today while trying to get into her car. She landed on her knees and hit the back of her head resulting in an abrasion. Denies loss of consciousness; known history of ASA and plavix. The patient was unable to stand after the fall, and was given 0.5 mg dilaudid en route by EMS. She notes left knee pain and swelling at baseline, but states it is now worse and is unable to bend it. She denies chest pain, headache, nausea, vomiting, neck pain, or back pain.     Review of Systems   Cardiovascular: Negative for chest pain.   Gastrointestinal: Negative for nausea and vomiting.   Musculoskeletal: Negative for back pain and neck pain.        Left knee pain   Neurological: Negative for syncope and headaches.   All other systems reviewed and are negative.      Allergies:  Celexa    Medications:  Fosamax  Aspirin 81 mg  Plavix  Zetia  Toviaz  Synthroid   Mirabegron  Macrobid   Crestor    Past Medical History:    Central serous retinopathy   Depressive disorder  Hyperlipidemia  Myxoid liposarcoma  Osteoporosis  Lymphedema  PAD  Shingles  Lumbosacral joint   UTI  Embolism of artery   Tubular adenoma   Hyperlipidemia     Past Surgical History:    Left hip arthroplasty   Femoropopliteal bypass graft   Appendectomy  Colonoscopy  Cystoscopy  Colposcopy cervix/vagina  D & C  Angiogram cath     Family History:    CAD  Alcoholism   Obesity  Osteoporosis    Social History:  Patient presents alone.     Physical Exam     Patient Vitals for the past 24 hrs:   BP Temp Temp src Pulse Resp SpO2   12/19/20 1600 -- -- -- -- -- 96 %   12/19/20 1430 -- -- -- -- -- 95 %   12/19/20 1415 -- -- -- -- -- 100 %   12/19/20 1400 -- -- -- -- -- 98 %   12/19/20 1345 138/86 -- -- -- -- 98 %   12/19/20 1340 138/86 -- -- -- -- --   12/19/20 1337 --  97.9  F (36.6  C) Oral 82 16 99 %       Physical Exam  General: Alert. Appears uncomfortable  Head:  The posterior scalp is with noted abrasion and mild soft tissue swelling  Eyes:  Sclera white; Pupils are equal and round  ENT:    External ears normal.  No hemotympanum.      External nares normal.  No septal hematoma.    Neck:  No midline tenderness or pain with full ROM.  CV:  Rate as above with regular rhythm   No murmur   2/2 radial and dorsal pedal pulses  Resp:  Breath sounds clear and equal bilaterally    Non-labored, no retractions or accessory muscle use  GI:  Abdomen soft, non-tender, non-distended    No rebound tenderness or guarding  MSK:  No midline tenderness or bony step-off    L. Knee with soft tissue swelling and ecchymosis, decreased ROM 2/2 to pain.  No fibular head tenderness. No hip tenderness.  LLE greater circumferential width compared to RLE, baseline per patient    Moves all extremities equally and symmetrically  Skin:  No rash or lesions noted.  Neuro:   No apparent deficit.    Sensation intact x4.     GCS: 15  Psych:  Normal affect.        Emergency Department Course     ECG:  Indication: Fall  Time: 1414  Vent. Rate 74 bpm. TN interval 184. QRS duration 114. QT/QTc 410/455. P-R-T axis 78 -35 70.   NSR, Left axis deviation, Right BBB   Read time: 1414    Imaging:    CT Head w/o IV contrast:   No acute intracranial abnormality, as per radiology.    XR Knee Left 3 Views:  Acute distracted-displaced transverse fracture through the   patella, larger inferior pole fragment is displaced inferiorly by 2 to   3 cm and is rotated.     Acute comminuted nondisplaced fracture of the proximal shaft and   metaphysis of the tibia, incompletely imaged. There is likely mildly   impacted fracture of the fibular neck. Cortical irregularity of the   subchondral medial distal femur posteriorly is more likely a   projectional artifact, less likely fracture.     Lipohemarthrosis. These bone demineralization. Old  fractured screw in   the distal femoral shaft, as per radiology.     XR Femur Left 2 Views:  Left hip arthroplasty stable. There is side plate and   cerclage wire fixation of old periprosthetic proximal left femur   fracture with likely healing of most of the fracture. Old fractured   screw in the distal femoral shaft. No acute femur fracture. There is   an acute displaced fracture of the patella. Diffuse bony   demineralization. Arterial calcification and stent-graft, as per radiology.     XR Tibia & Fibula Left 2 Views:  Comminuted fracture of the mid proximal shaft of the tibia   extending into the metaphysis. There is mild cortical displacement of   the fracture along the mid tibial shaft. Probable minimally impacted   fracture of the fibular head-neck. Diffuse bony demineralization, as per radiology.     Laboratory:    CBC: WBC: 12.8 (H), HGB: 12.2, PLT: 283  BMP: Glucose 133 (H), Anion Gap: 2 (L), o/w WNL (Creatinine: 0.54)    ABO/Rh type and screen: A, Rh Neg, Antibody Neg    Asymptomatic COVID-19 PCR: Pending     Procedures  Posterior Long Leg Splint Placement     Splint was applied and after placement I checked and adjusted the fit to ensure proper positioning. Patient was more comfortable with splint in place. Sensation and circulation are intact after splint placement.    Emergency Department Course:    Reviewed:  I reviewed the patient's nursing notes, vitals, past medical records, Care Everywhere.     Assessments:  1350   I evaluated the patient and performed an exam as above.    1530   I updated the patient on results and discussed plan of care.    1603   I rechecked patient and placed a splint.    Consults:   1456   I spoke with Radiology regarding patient's imaging.    1534   I spoke with Dr. Rhodes of the Ortho service regarding patient's presentation, findings, and plan of care. He will perform surgery tomorrow morning.     1557   I spoke with Dr. Sellers of the Hospitalist service regarding  patient's presentation, findings, and plan of care.    Interventions:  1402   Fentanyl, 50 mcg, IV  1526   Zofran, 4 mg, IV     Disposition:  The patient was admitted to the hospital under the care of Dr. Sellers.     Impression & Plan     Covid-19  Angeli Jacobson was evaluated during a global COVID-19 pandemic, which necessitated consideration that the patient might be at risk for infection with the SARS-CoV-2 virus that causes COVID-19.   Applicable protocols for evaluation were followed during the patient's care.   COVID-19 was considered as part of the patient's evaluation. The plan for testing is:  a test was obtained during this visit.    Medical Decision Making:  Patient is a 70-year-old female who presents status post mechanical fall with complaints of right knee pain.  She also has a noted head contusion arrival.  She denies any loss of consciousness.  CT head fortunately unremarkable.  X-ray show patellar fracture as well as tibial and fibular fracture.  Case was discussed with orthopedics who is planning surgical repair tomorrow.  She was placed in a posterior long-leg splint and remained neurovascularly intact.  Remainder of trauma exam negative.  Pain controlled in the ED.  She was accepted by hospitalist for admission.      Diagnosis:    ICD-10-CM    1. Closed fracture of left tibia and fibula, initial encounter  S82.202A     S82.402A    2. Closed displaced fracture of left patella, unspecified fracture morphology, initial encounter  S82.002A        Scribe Disclosure:  Vijaya ALBA, am serving as a scribe at 1:48 PM on 12/19/2020 to document services personally performed by Leticia Dempsey DO based on my observations and the provider's statements to me.      Leticia Dempsey DO  12/19/20 1384

## 2020-12-19 NOTE — PHARMACY-ADMISSION MEDICATION HISTORY
Admission medication history interview status for this patient is complete. See [facility] admission navigator for allergy information, prior to admission medications and immunization status.     Medication history interview done via telephone during Covid-19 pandemic, indicate source(s): Patient  Medication history resources (including written lists, pill bottles, clinic record):[facility]  Pharmacy: Discharge Pharmacy    Changes made to PTA medication list:  Added: None  Deleted: None  Changed: Myrbetriq to PRN    Actions taken by pharmacist (provider contacted, etc):None     Additional medication history information:None    Medication reconciliation/reorder completed by provider prior to medication history?  N      Prior to Admission medications    Medication Sig Last Dose Taking? Auth Provider   alendronate (FOSAMAX) 70 MG tablet TAKE 1 TABLET BY MOUTH EVERY 7 DAYS W/8 OZ WATER 30 MIN BEFORE BREAKFAST/REMAIN UPRIGHT 12/17/2020 at every Thrusday Yes Sepideh Burris, PABarakC   aspirin (ASA) 81 MG chewable tablet Take 81 mg by mouth daily 12/19/2020 at AM Yes Reported, Patient   calcium carbonate 600 mg-vitamin D 400 units (CALTRATE) 600-400 MG-UNIT per tablet Take 1 chew tab by mouth daily 12/19/2020 at AM Yes Reported, Patient   clopidogrel (PLAVIX) 75 MG tablet Take 1 tablet (75 mg) by mouth daily 12/19/2020 at AM Yes Tiesha Prince MD   ezetimibe (ZETIA) 10 MG tablet Take 1 tablet (10 mg) by mouth daily 12/19/2020 at AM Yes Tiesha Prince MD   fesoterodine fumarate (TOVIAZ) 4 MG TB24 24 hr tablet Take 1 tablet (4 mg) by mouth daily 12/18/2020 at PM Yes Jennifer Oquendo MD   levothyroxine (SYNTHROID/LEVOTHROID) 50 MCG tablet TAKE 1 TABLET BY MOUTH EVERY DAY 12/19/2020 at AM Yes Tiesha Prince MD   melatonin 3 MG tablet Take 3 mg by mouth nightly as needed for sleep   at PRN Yes Reported, Patient   mirabegron (MYRBETRIQ) 25 MG 24 hr tablet Take 25 mg by mouth daily as needed   at PRN Yes Unknown, Entered By History   multivitamin, therapeutic (THERA-VIT) TABS tablet Take 1 tablet by mouth daily  at AM Yes Reported, Patient   nitroFURantoin macrocrystal-monohydrate (MACROBID) 100 MG capsule Take 1 tablet after intercourse  at PRN Yes Sepideh Burris PA-C   rosuvastatin (CRESTOR) 20 MG tablet Take 1 tablet (20 mg) by mouth daily 12/18/2020 at PM Yes Tiesha Prince MD   order for DME Equipment being ordered: Left Thigh High Compression Stocking, 550 CCL.3   Yanelis Bailey MD   ORDER FOR DME Equipment being ordered: lymphedema bandages   Yanelis Bailey MD

## 2020-12-19 NOTE — ED NOTES
[facility]  ED Nurse Handoff Report    Angeli Jacobson is a 70 year old female   ED Chief complaint: Fall  . ED Diagnosis:   Final diagnoses:   None     Allergies:   Allergies   Allergen Reactions     Celexa [Citalopram Hydrobromide]      Decreased libido       Code Status: Full Code  Activity level - Baseline/Home:  Independent. Activity Level - Current:   Assist X 2. Lift room needed: No. Bariatric: No   Needed: No   Isolation: No. Infection: Not Applicable.     Vital Signs:   Vitals:    12/19/20 1345 12/19/20 1400 12/19/20 1415 12/19/20 1430   BP: 138/86      Pulse:       Resp:       Temp:       TempSrc:       SpO2: 98% 98% 100% 95%       Cardiac Rhythm:  ,      Pain level:    Patient confused: No. Patient Falls Risk: Yes.   Elimination Status: Has voided   Patient Report - Initial Complaint: Pt presents with left knee pain after a fall. Focused Assessment: Pt presents with left knee pain after falling on her icy driveway after trying to get into a car. Pt has a hx of lymphedema in that left leg as well as a left hip replacement, she also has had a surgery where a bunch of nerves were removed in that leg. Pt did hit her head but no LOC, she does have a small abrasion on the back of her head which was cleaned. Pt was found to have a tibia fracture with surgery needed tomorrow per ortho.    Tests Performed: labs, x-ray. Abnormal Results:   XR Tibia & Fibula Left 2 Views   Final Result   IMPRESSION: Comminuted fracture of the mid proximal shaft of the tibia   extending into the metaphysis. There is mild cortical displacement of   the fracture along the mid tibial shaft. Probable minimally impacted   fracture of the fibular head-neck. Diffuse bony demineralization.      HALLEY VAZQUEZ MD      XR Knee Left 3 Views   Final Result   IMPRESSION: Acute distracted-displaced transverse fracture through the   patella, larger inferior pole fragment is displaced inferiorly by 2 to   3 cm and is  rotated.      Acute comminuted nondisplaced fracture of the proximal shaft and   metaphysis of the tibia, incompletely imaged. There is likely mildly   impacted fracture of the fibular neck. Cortical irregularity of the   subchondral medial distal femur posteriorly is more likely a   projectional artifact, less likely fracture.      Lipohemarthrosis. These bone demineralization. Old fractured screw in   the distal femoral shaft.      HALLEY VAZQUEZ MD      XR Femur Left 2 Views   Final Result   IMPRESSION: Left hip arthroplasty stable. There is side plate and   cerclage wire fixation of old periprosthetic proximal left femur   fracture with likely healing of most of the fracture. Old fractured   screw in the distal femoral shaft. No acute femur fracture. There is   an acute displaced fracture of the patella. Diffuse bony   demineralization. Arterial calcification and stent-graft.      HALLEY VAZQUEZ MD      Head CT w/o contrast   Final Result   IMPRESSION: No acute intracranial abnormality.      PRIETO HAWLEY MD        Treatments provided: fentanyl, zofran  Family Comments: Pt is contact with   OBS brochure/video discussed/provided to patient:  Yes  ED Medications:   Medications   fentaNYL (PF) (SUBLIMAZE) injection 50 mcg (50 mcg Intravenous Given 12/19/20 1402)   ondansetron (ZOFRAN) injection 4 mg (4 mg Intravenous Given 12/19/20 1526)     Drips infusing:  No  For the majority of the shift, the patient's behavior Green. Interventions performed were .    Sepsis treatment initiated: No     Patient tested for COVID 19 prior to admission: YES    ED Nurse Name/Phone Number: Cathie White RN,   3:41 PM    RECEIVING UNIT ED HANDOFF REVIEW    Above ED Nurse Handoff Report was reviewed: Yes  Reviewed by: Vivien Berry RN on December 19, 2020 at 5:45 PM

## 2020-12-19 NOTE — ED TRIAGE NOTES
Pt arrives via EMS, pt fell on her driveway after trying to get into her car, pt c/o left knee pain, does have a hx of lymphedema as well as left hip replacement, she also had a bundle of nerves removed from that leg as well. Pt did hit head but no LOC, or thinners. Pt alert, oriented x3 ABCs intact Pt did received 0.5 mg dilaudid

## 2020-12-19 NOTE — ED NOTES
Bed: ED18  Expected date: 12/19/20  Expected time: 1:05 PM  Means of arrival:   Comments:  A592 fall 70F

## 2020-12-19 NOTE — H&P
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  X-ray of the left lower extremity was done and showed comminuted fracture of the mid to proximal shaft of the tibia extending into the metaphysis with mild cortical displacement. There is also probable minimally impacted fracture of the fibula. Orthopedic surgery was consulted from emergency room and planning surgery tomorrow.  Patient was admitted to the hospital for further management.     1.  Mechanical fall with left tibiofibular fracture, immobilized in the ER  Dr. Rhodes from orthopedic surgery was consulted and planning to take her to the OR tomorrow.  We will keep her n.p.o. after midnight.  We will hold aspirin and Plavix for now.  We will put her on IV Dilaudid as needed for pain control.  We will put her on IV fluids after midnight while n.p.o.    2.  History of peripheral artery disease, on Plavix and aspirin.  Will hold aspirin and Plavix for now for anticipated surgery tomorrow.  Her medications can be resumed when okay with orthopedic surgery    3.  Hyperlipidemia: We will resume rosuvastatin    4.  Hypothyroidism: We will resume Synthroid at home dose    We will admit patient to medical floor as inpatient.    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  She stated that she was going for a walk around 1 PM and she slipped on her driveway and fell. She stated that she started to have left lower extremity pain after a fall.  She denies losing consciousness.  Currently she denies chest pain, shortness of breath, nausea, vomiting.  She has no urinary symptoms.   On arrival to emergency room, her vital signs were checked and showed temperature 97.9, pulse 82, blood pressure 138/86, oxygen saturation 98% on room air.  Laboratory work-up showed sodium 114, potassium 4.1, creatinine 1.54, glucose 133, WBC 12.8, hemoglobin 12.2.  X-ray of the left lower extremity was done and showed comminuted fracture of the mid to proximal shaft of the tibia extending into the metaphysis with mild cortical displacement.  There is also probable minimally impacted fracture of the fibula.  Patient was given fentanyl 50 mcg IV in the ER.  Orthopedic surgery was consulted and they are planning to do surgery tomorrow.  Patient was admitted to the hospital for further evaluation.      Past Medical History    I have reviewed this patient's medical history and updated it with pertinent information if needed.   Past Medical History:   Diagnosis Date     * * * SBE PROPHYLAXIS * * * 1998    Amox 500mg, take 4 tabs one hour prior to procedure.Takes this because of lymphedema secondary from leg surgey     Central  serous retinopathy 2001    Resolved 9/2001     CHRONIC NECK PAIN 1995     Depressive disorder      Depressive disorder, not elsewhere classified 2001     History of blood transfusion 04/2019    during left hip pinning     MIXED HYPERLIPIDEMIA, LDL GOAL <160 1998    LDL goal < 160     Motion sickness      MYXOID LIPOSARCOMA 2000    Left thigh, S/P excision, radiation  at [facility]     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    CHRONIC LEFT THIGH LYMPHEDEMA     Nontoxic multinodular goiter 2005    needs yearly US     Osteoporosis, unspecified 2001     Other lymphedema 2000    left thigh, gets regular PT for this     PAD (peripheral artery disease) (H) 4/20/2018     PONV (postoperative nausea and vomiting)      SHINGLES 2001     Sprain of lumbosacral (joint) (ligament) 1995    right     Unspecified hearing loss 1998    chronic tinnitus     Unspecified tinnitus 1998       Past Surgical History   I have reviewed this patient's surgical history and updated it with pertinent information if needed.  Past Surgical History:   Procedure Laterality Date     ARTHROPLASTY HIP Left 10/4/2019    Procedure: Removal of left femoral hardware with a conversion to left total hip arthroplasty using a Biomet Annalisa femoral stem with an OsseoTi acetabular shell and a dual mobility bearing surface;  Surgeon: Spencer Celeste MD;  Location: [facility] OR     BYPASS GRAFT FEMOROPOPLITEAL Left 1/21/2019    Procedure: LEFT FEMORAL TO ABOVE KNEE POPLITEAL  BYPASS WITH POLYTETRAFLUOROETHYLENE GRAFT;  Surgeon: Shade Owens MD;  Location: [facility] OR     C APPENDECTOMY      Appendectomy     COLONOSCOPY N/A 2/5/2016    Procedure: COMBINED COLONOSCOPY, SINGLE OR MULTIPLE BIOPSY/POLYPECTOMY BY BIOPSY;  Surgeon: Varun Stanley MD, MD;  Location:  GI     CYSTOSCOPY       EXCISION MALIG LESION>1.25CM  5/2000    Myxoid Liposarcoma       EXPLORE GROIN Right 5/1/2018    Procedure: EXPLORE GROIN;  EMERGENCY RIGHT FEMORAL EXPLORATION WITH FEMORAL ARTERY  REPAIR.    EBL: 50mL;  Surgeon: Shade Owens MD;  Location: [facility] OR     HC COLONOSCOPY THRU STOMA, DIAGNOSTIC  2006    due 2010     HC COLP CERVIX/UPPER VAGINA  07/1997    Negative     HC DILATION/CURETTAGE DIAG/THER NON OB  02/1997    Post menopausal bleeding on HRT, negative     HIP SURGERY Left 04/13/2019     IR ANGIOGRAM THROUGH CATHETER FOLLOW UP  12/20/2018     IR ANGIOGRAM THROUGH CATHETER FOLLOW UP  12/21/2018     IR LOWER EXTREMITY ANGIOGRAM LEFT  12/19/2018     VASCULAR SURGERY  04/30/2018    Left SFA stent in bypass graft     [facility] NONSPECIFIC PROCEDURE  04/2000    Open Biopsy Left Thigh Liposarcoma       Prior to Admission Medications   Prior to Admission Medications   Prescriptions Last Dose Informant Patient Reported? Taking?   ORDER FOR DME  Self No No   Sig: Equipment being ordered: lymphedema bandages   alendronate (FOSAMAX) 70 MG tablet 12/17/2020 at every Thrusday  No Yes   Sig: TAKE 1 TABLET BY MOUTH EVERY 7 DAYS W/8 OZ WATER 30 MIN BEFORE BREAKFAST/REMAIN UPRIGHT   aspirin (ASA) 81 MG chewable tablet   Yes No   Sig: Take 81 mg by mouth daily   calcium carbonate 600 mg-vitamin D 400 units (CALTRATE) 600-400 MG-UNIT per tablet   Yes No   Sig: Take 1 chew tab by mouth daily   clopidogrel (PLAVIX) 75 MG tablet   No No   Sig: Take 1 tablet (75 mg) by mouth daily   ezetimibe (ZETIA) 10 MG tablet   No No   Sig: Take 1 tablet (10 mg) by mouth daily   fesoterodine fumarate (TOVIAZ) 4 MG TB24 24 hr tablet   No No   Sig: Take 1 tablet (4 mg) by mouth daily   levothyroxine (SYNTHROID/LEVOTHROID) 50 MCG tablet   No No   Sig: TAKE 1 TABLET BY MOUTH EVERY DAY   melatonin 3 MG tablet  at PRN  Yes Yes   Sig: Take 3 mg by mouth nightly as needed for sleep    mirabegron (MYRBETRIQ) 25 MG 24 hr tablet  at PRN  Yes Yes   Sig: Take 25 mg by mouth daily as needed   multivitamin, therapeutic (THERA-VIT) TABS tablet  at AM Self Yes Yes   Sig: Take 1 tablet by mouth daily   nitroFURantoin macrocrystal-monohydrate  (MACROBID) 100 MG capsule  at PRN  No Yes   Sig: Take 1 tablet after intercourse   order for DME  Self No No   Sig: Equipment being ordered: Left Thigh High Compression Stocking, 550 CCL.3   rosuvastatin (CRESTOR) 20 MG tablet 2020 at PM  No Yes   Sig: Take 1 tablet (20 mg) by mouth daily      Facility-Administered Medications: None     Allergies   Allergies   Allergen Reactions     Celexa [Citalopram Hydrobromide]      Decreased libido       Social History   I have reviewed this patient's social history and updated it with pertinent information if needed. Angeli Jacobson  reports that she has never smoked. She has never used smokeless tobacco. She reports that she does not drink alcohol or use drugs.    Family History   I have reviewed this patient's family history and updated it with pertinent information if needed.   Family History   Problem Relation Age of Onset     C.A.D. Father         MI 57     Alcohol/Drug Father         etoh     Obesity Mother      Osteoporosis Mother      Colon Cancer Brother 70     Hyperlipidemia Son      Hyperlipidemia Son         very high, experimental drug     C.A.D. Paternal Grandmother         ascvd     Diabetes Maternal Grandmother      Cancer Maternal Grandmother      C.A.D. Paternal Uncle         Mi  age 48     Cancer Maternal Aunt         pancreatic CA       Review of Systems   The 10 point Review of Systems is negative other than noted in the HPI or here. fall    Physical Exam   Temp: 97.9  F (36.6  C) Temp src: Oral BP: 138/86 Pulse: 82   Resp: 16 SpO2: 96 % O2 Device: None (Room air)    Vital Signs with Ranges  Temp:  [97.9  F (36.6  C)] 97.9  F (36.6  C)  Pulse:  [82] 82  Resp:  [16] 16  BP: (138)/(86) 138/86  SpO2:  [95 %-100 %] 96 %  0 lbs 0 oz    GEN:  Alert, oriented x 3, appears comfortable, NAD.  HEENT:  Normocephalic/atraumatic, no scleral icterus, no nasal discharge, mouth moist.  CV:  Regular rate and rhythm, no murmur or JVD.  S1 + S2 noted, no  S3 or S4.  LUNGS:  Clear to auscultation bilaterally without rales/rhonchi/wheezing/retractions.  Symmetric chest rise on inhalation noted.  ABD:  Active bowel sounds, soft, non-tender/non-distended.  No rebound/guarding/rigidity.  EXT:  No edema or cyanosis.  Left lower extremity immobilized  SKIN:  Dry to touch, no exanthems noted in the visualized areas.  NEURO:  Symmetric muscle strength, sensation to touch grossly intact.  No new focal deficits appreciated.    Data   Data reviewed today:  I personally reviewed no images or EKG's today.  Recent Labs   Lab 12/19/20  1513   WBC 12.8*   HGB 12.2   MCV 95         POTASSIUM 4.1   CHLORIDE 107   CO2 31   BUN 16   CR 0.54   ANIONGAP 2*   LONG 9.2   *       Recent Results (from the past 24 hour(s))   Head CT w/o contrast    Narrative    CT SCAN OF THE HEAD WITHOUT CONTRAST   12/19/2020 2:47 PM     HISTORY: Head trauma, minor, GCS>=13, low clinical risk, initial exam.    TECHNIQUE:  Axial images of the head and coronal reformations without  IV contrast material. Radiation dose for this scan was reduced using  automated exposure control, adjustment of the mA and/or kV according  to patient size, or iterative reconstruction technique.    COMPARISON: Head MRI 6/4/2019, head CT 4/13/2006.    FINDINGS: Mild volume loss is present. White matter hypoattenuation  likely represents mild chronic small vessel ischemic change. The  cerebral hemispheres, brainstem, and cerebellum otherwise demonstrate  normal morphology and attenuation. No evidence of acute ischemia,  hemorrhage, mass, mass effect or hydrocephalus. The visualized  calvarium, tympanic cavities, and mastoid cavities are unremarkable.  Air-fluid level within the left maxillary sinus. Small left parietal  scalp contusion.      Impression    IMPRESSION: No acute intracranial abnormality.    PRIETO HAWLEY MD   XR Femur Left 2 Views    Narrative    XR FEMUR LT 2 VW 12/19/2020 3:05 PM     HISTORY: femur  pain  COMPARISON: Left hip x-ray 10/4/2019       Impression    IMPRESSION: Left hip arthroplasty stable. There is side plate and  cerclage wire fixation of old periprosthetic proximal left femur  fracture with likely healing of most of the fracture. Old fractured  screw in the distal femoral shaft. No acute femur fracture. There is  an acute displaced fracture of the patella. Diffuse bony  demineralization. Arterial calcification and stent-graft.    HALLEY VAZQUEZ MD   XR Knee Left 3 Views    Narrative    XR KNEE LT 3 VW   12/19/2020 3:05 PM     HISTORY: knee pain  COMPARISON: None.       Impression    IMPRESSION: Acute distracted-displaced transverse fracture through the  patella, larger inferior pole fragment is displaced inferiorly by 2 to  3 cm and is rotated.    Acute comminuted nondisplaced fracture of the proximal shaft and  metaphysis of the tibia, incompletely imaged. There is likely mildly  impacted fracture of the fibular neck. Cortical irregularity of the  subchondral medial distal femur posteriorly is more likely a  projectional artifact, less likely fracture.    Lipohemarthrosis. These bone demineralization. Old fractured screw in  the distal femoral shaft.    HALLEY VAZQUEZ MD   XR Tibia & Fibula Left 2 Views    Narrative    XR TIBIA & FIBULA LT 2 VW 12/19/2020 3:06 PM     HISTORY: tibia pain  COMPARISON: None.       Impression    IMPRESSION: Comminuted fracture of the mid proximal shaft of the tibia  extending into the metaphysis. There is mild cortical displacement of  the fracture along the mid tibial shaft. Probable minimally impacted  fracture of the fibular head-neck. Diffuse bony demineralization.    HALLEY VAZQUEZ MD

## 2020-12-20 LAB
ANION GAP SERPL CALCULATED.3IONS-SCNC: 3 MMOL/L (ref 3–14)
BASOPHILS # BLD AUTO: 0 10E9/L (ref 0–0.2)
BASOPHILS NFR BLD AUTO: 0.4 %
BUN SERPL-MCNC: 14 MG/DL (ref 7–30)
CALCIUM SERPL-MCNC: 8.4 MG/DL (ref 8.5–10.1)
CHLORIDE SERPL-SCNC: 107 MMOL/L (ref 94–109)
CO2 SERPL-SCNC: 27 MMOL/L (ref 20–32)
CREAT SERPL-MCNC: 0.6 MG/DL (ref 0.52–1.04)
DIFFERENTIAL METHOD BLD: ABNORMAL
EOSINOPHIL # BLD AUTO: 0 10E9/L (ref 0–0.7)
EOSINOPHIL NFR BLD AUTO: 0.1 %
ERYTHROCYTE [DISTWIDTH] IN BLOOD BY AUTOMATED COUNT: 14.2 % (ref 10–15)
GFR SERPL CREATININE-BSD FRML MDRD: >90 ML/MIN/{1.73_M2}
GLUCOSE SERPL-MCNC: 113 MG/DL (ref 70–99)
HCT VFR BLD AUTO: 31 % (ref 35–47)
HGB BLD-MCNC: 10 G/DL (ref 11.7–15.7)
IMM GRANULOCYTES # BLD: 0 10E9/L (ref 0–0.4)
IMM GRANULOCYTES NFR BLD: 0.3 %
LYMPHOCYTES # BLD AUTO: 1.4 10E9/L (ref 0.8–5.3)
LYMPHOCYTES NFR BLD AUTO: 18.6 %
MCH RBC QN AUTO: 30 PG (ref 26.5–33)
MCHC RBC AUTO-ENTMCNC: 32.3 G/DL (ref 31.5–36.5)
MCV RBC AUTO: 93 FL (ref 78–100)
MONOCYTES # BLD AUTO: 0.6 10E9/L (ref 0–1.3)
MONOCYTES NFR BLD AUTO: 8.4 %
NEUTROPHILS # BLD AUTO: 5.3 10E9/L (ref 1.6–8.3)
NEUTROPHILS NFR BLD AUTO: 72.2 %
NRBC # BLD AUTO: 0 10*3/UL
NRBC BLD AUTO-RTO: 0 /100
PLATELET # BLD AUTO: 214 10E9/L (ref 150–450)
POTASSIUM SERPL-SCNC: 4.1 MMOL/L (ref 3.4–5.3)
RBC # BLD AUTO: 3.33 10E12/L (ref 3.8–5.2)
SODIUM SERPL-SCNC: 137 MMOL/L (ref 133–144)
WBC # BLD AUTO: 7.4 10E9/L (ref 4–11)

## 2020-12-20 PROCEDURE — 99225 PR SUBSEQUENT OBSERVATION CARE,LEVEL II: CPT | Performed by: INTERNAL MEDICINE

## 2020-12-20 PROCEDURE — 80048 BASIC METABOLIC PNL TOTAL CA: CPT | Performed by: INTERNAL MEDICINE

## 2020-12-20 PROCEDURE — 96375 TX/PRO/DX INJ NEW DRUG ADDON: CPT

## 2020-12-20 PROCEDURE — G0378 HOSPITAL OBSERVATION PER HR: HCPCS

## 2020-12-20 PROCEDURE — 96376 TX/PRO/DX INJ SAME DRUG ADON: CPT

## 2020-12-20 PROCEDURE — 250N000013 HC RX MED GY IP 250 OP 250 PS 637: Performed by: INTERNAL MEDICINE

## 2020-12-20 PROCEDURE — 250N000013 HC RX MED GY IP 250 OP 250 PS 637: Performed by: PHYSICIAN ASSISTANT

## 2020-12-20 PROCEDURE — 85025 COMPLETE CBC W/AUTO DIFF WBC: CPT | Performed by: INTERNAL MEDICINE

## 2020-12-20 PROCEDURE — 36415 COLL VENOUS BLD VENIPUNCTURE: CPT | Performed by: INTERNAL MEDICINE

## 2020-12-20 PROCEDURE — 258N000003 HC RX IP 258 OP 636: Performed by: INTERNAL MEDICINE

## 2020-12-20 PROCEDURE — 96361 HYDRATE IV INFUSION ADD-ON: CPT

## 2020-12-20 PROCEDURE — 250N000011 HC RX IP 250 OP 636: Performed by: INTERNAL MEDICINE

## 2020-12-20 PROCEDURE — 258N000003 HC RX IP 258 OP 636: Performed by: PHYSICIAN ASSISTANT

## 2020-12-20 RX ORDER — PROCHLORPERAZINE 25 MG
12.5 SUPPOSITORY, RECTAL RECTAL EVERY 12 HOURS PRN
Status: DISCONTINUED | OUTPATIENT
Start: 2020-12-20 | End: 2020-12-23 | Stop reason: HOSPADM

## 2020-12-20 RX ORDER — PROCHLORPERAZINE MALEATE 5 MG
5 TABLET ORAL EVERY 6 HOURS PRN
Status: DISCONTINUED | OUTPATIENT
Start: 2020-12-20 | End: 2020-12-23 | Stop reason: HOSPADM

## 2020-12-20 RX ORDER — VITAMIN B COMPLEX
50 TABLET ORAL DAILY
Status: DISCONTINUED | OUTPATIENT
Start: 2020-12-20 | End: 2020-12-23 | Stop reason: HOSPADM

## 2020-12-20 RX ORDER — AMOXICILLIN 250 MG
1-2 CAPSULE ORAL 2 TIMES DAILY PRN
Status: DISCONTINUED | OUTPATIENT
Start: 2020-12-20 | End: 2020-12-23 | Stop reason: HOSPADM

## 2020-12-20 RX ADMIN — ACETAMINOPHEN 650 MG: 325 TABLET, FILM COATED ORAL at 20:10

## 2020-12-20 RX ADMIN — SODIUM CHLORIDE: 9 INJECTION, SOLUTION INTRAVENOUS at 13:02

## 2020-12-20 RX ADMIN — TRAMADOL HYDROCHLORIDE 25 MG: 50 TABLET ORAL at 00:15

## 2020-12-20 RX ADMIN — HYDROMORPHONE HYDROCHLORIDE 0.3 MG: 1 INJECTION, SOLUTION INTRAMUSCULAR; INTRAVENOUS; SUBCUTANEOUS at 10:48

## 2020-12-20 RX ADMIN — SODIUM CHLORIDE: 9 INJECTION, SOLUTION INTRAVENOUS at 00:15

## 2020-12-20 RX ADMIN — TRAMADOL HYDROCHLORIDE 25 MG: 50 TABLET ORAL at 09:04

## 2020-12-20 RX ADMIN — ONDANSETRON 4 MG: 2 INJECTION INTRAMUSCULAR; INTRAVENOUS at 11:09

## 2020-12-20 RX ADMIN — ACETAMINOPHEN 650 MG: 325 TABLET, FILM COATED ORAL at 16:08

## 2020-12-20 RX ADMIN — ROSUVASTATIN CALCIUM 20 MG: 5 TABLET, FILM COATED ORAL at 19:15

## 2020-12-20 RX ADMIN — TOLTERODINE TARTRATE 2 MG: 2 CAPSULE, EXTENDED RELEASE ORAL at 19:15

## 2020-12-20 ASSESSMENT — ACTIVITIES OF DAILY LIVING (ADL)
ADLS_ACUITY_SCORE: 15
ADLS_ACUITY_SCORE: 16
ADLS_ACUITY_SCORE: 16
ADLS_ACUITY_SCORE: 18

## 2020-12-20 NOTE — PLAN OF CARE
"[id number]: pt resting comfortably. VSS. A&O. Received ultram and IV dilaudid for pain. Gave zofran x1. MD notified for ongoing nausea- md ordered compazine. Compazine not given pt states the \"nausea passed\". Ace wrap/dressing CDI. CMS intact. IVF infusing. Md notified d/t bladder scan show [phone] requesting PRN straight cath order- intermittent straight cath/place thompson order received. Plan is for surgery tomorrow.   "

## 2020-12-20 NOTE — PLAN OF CARE
PM Shift Obs Note  Patient vital signs are at baseline: Yes  Patient able to ambulate as they were prior to admission or with assist devices provided by therapies during their stay:  No,  Reason:  Scheduled for surgery in the morning.  Patient MUST void prior to discharge:  Yes  Patient able to tolerate oral intake:  Yes  Pain has adequate pain control using Oral analgesics:  Yes      Patient placed on observation at 1400 this afternoon. A/Ox4 and VSS. CMS intact. Denied N/V. Gave tylenol for c/o HA. Pure wick in place, able to void minimally. Will place Benitez if bladder scan >300 ml. ACE wrap/dressing CDI. NPO after midnight. Surgery scheduled for tomorrow (12/21) at 0730.

## 2020-12-20 NOTE — PROGRESS NOTES
[facility]  Hospitalist Progress Note  Jose Velasquez MD 12/20/2020    Reason for Stay (Diagnosis): Left proximal midshaft tibiofibular fracture with comminution.         Assessment and Plan:      Summary of Stay: Angeli Jacobson is a 70 year old female admitted on 12/19/2020 with left lower extremity fracture after slipping on ice at home.    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  She had superficial femoral arterial resection at that time as well and a graft was placed.    Problem List:   1. Left midshaft tibiofibular fracture status post fall.  2. Peripheral arterial disease managed with Plavix and aspirin.  3. Hypothyroidism  4. Dyslipidemia    Plan:  1.  I was asked to switch the patient to us observation status.  I believe she will be nonweightbearing with this fracture after repair and certainly would not be able to manage as an outpatient at this point.  2.  Anticipate surgery tomorrow.  N.p.o. after midnight.    DVT Prophylaxis: Pneumatic Compression Devices  Code Status: Full Code  Discharge Dispo: TBD  Estimated Disch Date / # of Days until Disch: Likely 2 to 3 days        Interval History (Subjective):      Chart reviewed, pt interviewed.    Ms. Jacobson indicates that she has tolerated surgeries very well in the past.      She tells me at this point that she is very uncomfortable with PCD's.  Denies problems with constipation.  No known heart or lung disease.  Renal function is normal.  Bone marrow functions normal.                  Physical Exam:      Last Vital Signs:  /63 (BP Location: Right arm)   Pulse 79   Temp 98.1  F (36.7  C) (Temporal)   Resp 16   LMP 03/18/2005   SpO2 94%     I/O last 3 completed shifts:  In: 659 [P.O.:240; I.V.:419]  Out: 600 [Urine:600]    Constitutional: Awake, alert, cooperative, no apparent distress   Respiratory: Clear to auscultation bilaterally, no crackles or wheezing    Cardiovascular: Regular rate and rhythm, normal S1 and S2, and no murmur noted   Abdomen: Normal bowel sounds, soft, non-distended, non-tender   Skin: No rashes, no cyanosis, dry to touch   Neuro: Alert and oriented x3.   Extremities: No edema.  Left lower extremity in splint.   Other(s):        All other systems: Negative          Medications:      All current medications were reviewed with changes reflected in problem list.         Data:      All new lab and imaging data was reviewed.   Labs/Imaging:  Results for orders placed or performed during the hospital encounter of 12/19/20 (from the past 24 hour(s))   Basic metabolic panel   Result Value Ref Range    Sodium 137 133 - 144 mmol/L    Potassium 4.1 3.4 - 5.3 mmol/L    Chloride 107 94 - 109 mmol/L    Carbon Dioxide 27 20 - 32 mmol/L    Anion Gap 3 3 - 14 mmol/L    Glucose 113 (H) 70 - 99 mg/dL    Urea Nitrogen 14 7 - 30 mg/dL    Creatinine 0.60 0.52 - 1.04 mg/dL    GFR Estimate >90 >60 mL/min/[1.73_m2]    GFR Estimate If Black >90 >60 mL/min/[1.73_m2]    Calcium 8.4 (L) 8.5 - 10.1 mg/dL   CBC with platelets differential   Result Value Ref Range    WBC 7.4 4.0 - 11.0 10e9/L    RBC Count 3.33 (L) 3.8 - 5.2 10e12/L    Hemoglobin 10.0 (L) 11.7 - 15.7 g/dL    Hematocrit 31.0 (L) 35.0 - 47.0 %    MCV 93 78 - 100 fl    MCH 30.0 26.5 - 33.0 pg    MCHC 32.3 31.5 - 36.5 g/dL    RDW 14.2 10.0 - 15.0 %    Platelet Count 214 150 - 450 10e9/L    Diff Method Automated Method     % Neutrophils 72.2 %    % Lymphocytes 18.6 %    % Monocytes 8.4 %    % Eosinophils 0.1 %    % Basophils 0.4 %    % Immature Granulocytes 0.3 %    Nucleated RBCs 0 0 /100    Absolute Neutrophil 5.3 1.6 - 8.3 10e9/L    Absolute Lymphocytes 1.4 0.8 - 5.3 10e9/L    Absolute Monocytes 0.6 0.0 - 1.3 10e9/L    Absolute Eosinophils 0.0 0.0 - 0.7 10e9/L    Absolute Basophils 0.0 0.0 - 0.2 10e9/L    Abs Immature Granulocytes 0.0 0 - 0.4 10e9/L    Absolute Nucleated RBC 0.0

## 2020-12-20 NOTE — PLAN OF CARE
Md notified d/t Pt still nauseous after IV Zofran can I get other antiemetics? Is pt continue to be NPO today? Thank you!    Medication ordered    1312: Md notified of pt not voided yet this shift- can I get an order for straight cath if needed? thank you  Intermittent straight cath/thompson placement order received 7113

## 2020-12-20 NOTE — PLAN OF CARE
VSS on RA. Pt rested in bed this shift, c/o HA pain, improved after prn tramadol. PIV with IVF at 75 ml/hr overnight, NPO at [facility] with meds. Purewick in place, pt denies urge to void, unable to spontaneously void despite bladder scan volume > 500 ml. Straight cath'd this AM for 600 ml, good tolerance. LLE elevated, splint in place, CMS intact. Bed alarms in use, pt not attempting to exit bed. Alert and oriented, able to make needs known. Continue to monitor.

## 2020-12-20 NOTE — PLAN OF CARE
PM Shift    Patient vital signs are at baseline: Yes  Patient able to ambulate as they were prior to admission or with assist devices provided by therapies during their stay:  No,  Reason:  Bedrest, surgery scheduled tomorrow  Patient MUST void prior to discharge:  Yes  Patient able to tolerate oral intake:  Yes  Pain has adequate pain control using Oral analgesics:  Yes     Pt arrived from ED to unit at 1800. A/Ox4, VSS on room air. Hx of lymphedema in L leg, JIMBO d/t ACE wrap. Pain controlled with tylenol. Did chlorhexidine scrub. Pure wick in place. Covid test came back negative. NPO after midnight and surgery planned for tomorrow.

## 2020-12-20 NOTE — UTILIZATION REVIEW
"  [facility] Utilization Review  Admission Status; Secondary Review Determination     Admission Date: 12/19/2020  1:29 PM      Under the authority of the Utilization Management Committee, the utilization review process indicated a secondary review on the above patient.  The review outcome is based on review of the medical records, discussions with staff, and applying clinical experience noted on the date of the review.        (X) Observation Status Appropriate - This patient does not meet hospital inpatient criteria and is placed in observation status. If this patient's primary payer is Medicare and was admitted as an inpatient, Condition Code 44 should be used and patient status changed to \"observation\".   () Observation Status concurrent Review           RATIONALE FOR DETERMINATION   Angeli Jacobson is a 70 year old female with past medical history of PAD, who presented to the emergency room with complaints of left lower extremity pain after a mechanical fall.  Initial evaluation was revealing for comminuted fracture of the mid to proximal shaft of the tibia extending into the metaphysis with mild cortical displacement.  She is admitted for pain control, perioperative management and planned ORIF per orthopedic surgery.Based on severity of illness and intensity of service, patient does not meet criteria for inpatient admission.  Procedure not on CMS inpatient list, and requiring Dilaudid IV x1 thus far.  However, she does need pain control, PT/OT and perioperative management with safe disposition planning after the procedure.  Observation cares are appropriate for above-mentioned management and safe disposition.  Recommendation is communicated to the primary team, Dr. Velasquez.     The definitions of Inpatient Status and Observation Status used in making the determination above are those provided in the CMS Coverage Manual, Chapter 1 and Chapter 6, section 70.4.      Sincerely,       Cathie Beck MD, " MS  Physician Advisor  Utilization Review-[place]    Phone: [phone]

## 2020-12-21 ENCOUNTER — ANESTHESIA (OUTPATIENT)
Dept: SURGERY | Facility: CLINIC | Age: 70
End: 2020-12-21
Payer: COMMERCIAL

## 2020-12-21 ENCOUNTER — APPOINTMENT (OUTPATIENT)
Dept: GENERAL RADIOLOGY | Facility: CLINIC | Age: 70
End: 2020-12-21
Attending: INTERNAL MEDICINE
Payer: COMMERCIAL

## 2020-12-21 ENCOUNTER — ANESTHESIA EVENT (OUTPATIENT)
Dept: SURGERY | Facility: CLINIC | Age: 70
End: 2020-12-21
Payer: COMMERCIAL

## 2020-12-21 LAB
ABO + RH BLD: NORMAL
ABO + RH BLD: NORMAL
ALBUMIN UR-MCNC: 10 MG/DL
ALBUMIN UR-MCNC: 30 MG/DL
AMORPH CRY #/AREA URNS HPF: ABNORMAL /HPF
APPEARANCE UR: ABNORMAL
APPEARANCE UR: ABNORMAL
BACTERIA #/AREA URNS HPF: ABNORMAL /HPF
BACTERIA #/AREA URNS HPF: ABNORMAL /HPF
BILIRUB UR QL STRIP: NEGATIVE
BILIRUB UR QL STRIP: NEGATIVE
BLD GP AB SCN SERPL QL: NORMAL
BLD PROD TYP BPU: NORMAL
BLD UNIT ID BPU: 0
BLD UNIT ID BPU: 0
BLOOD BANK CMNT PATIENT-IMP: NORMAL
BLOOD PRODUCT CODE: NORMAL
BLOOD PRODUCT CODE: NORMAL
BPU ID: NORMAL
BPU ID: NORMAL
COLOR UR AUTO: ABNORMAL
COLOR UR AUTO: YELLOW
GLUCOSE UR STRIP-MCNC: NEGATIVE MG/DL
GLUCOSE UR STRIP-MCNC: NEGATIVE MG/DL
HGB BLD-MCNC: 7.9 G/DL (ref 11.7–15.7)
HGB BLD-MCNC: 9.6 G/DL (ref 11.7–15.7)
HGB UR QL STRIP: ABNORMAL
HGB UR QL STRIP: ABNORMAL
INTERPRETATION ECG - MUSE: NORMAL
KETONES UR STRIP-MCNC: ABNORMAL MG/DL
KETONES UR STRIP-MCNC: NEGATIVE MG/DL
LEUKOCYTE ESTERASE UR QL STRIP: ABNORMAL
LEUKOCYTE ESTERASE UR QL STRIP: ABNORMAL
MUCOUS THREADS #/AREA URNS LPF: PRESENT /LPF
MUCOUS THREADS #/AREA URNS LPF: PRESENT /LPF
NITRATE UR QL: NEGATIVE
NITRATE UR QL: POSITIVE
NUM BPU REQUESTED: 2
PH UR STRIP: 5.5 PH (ref 5–7)
PH UR STRIP: 6 PH (ref 5–7)
RBC #/AREA URNS AUTO: 135 /HPF (ref 0–2)
RBC #/AREA URNS AUTO: 34 /HPF (ref 0–2)
SOURCE: ABNORMAL
SOURCE: ABNORMAL
SP GR UR STRIP: 1.01 (ref 1–1.03)
SP GR UR STRIP: 1.01 (ref 1–1.03)
SPECIMEN EXP DATE BLD: NORMAL
SQUAMOUS #/AREA URNS AUTO: 2 /HPF (ref 0–1)
SQUAMOUS #/AREA URNS AUTO: <1 /HPF (ref 0–1)
TRANSFUSION STATUS PATIENT QL: NORMAL
UROBILINOGEN UR STRIP-MCNC: NORMAL MG/DL (ref 0–2)
UROBILINOGEN UR STRIP-MCNC: NORMAL MG/DL (ref 0–2)
WBC #/AREA URNS AUTO: >182 /HPF (ref 0–5)
WBC #/AREA URNS AUTO: >182 /HPF (ref 0–5)
WBC CLUMPS #/AREA URNS HPF: PRESENT /HPF
WBC CLUMPS #/AREA URNS HPF: PRESENT /HPF

## 2020-12-21 PROCEDURE — 250N000011 HC RX IP 250 OP 636: Performed by: INTERNAL MEDICINE

## 2020-12-21 PROCEDURE — 87086 URINE CULTURE/COLONY COUNT: CPT | Performed by: INTERNAL MEDICINE

## 2020-12-21 PROCEDURE — 99207 PR CDG-CODE CATEGORY CHANGED: CPT | Performed by: INTERNAL MEDICINE

## 2020-12-21 PROCEDURE — 258N000003 HC RX IP 258 OP 636: Performed by: PHYSICIAN ASSISTANT

## 2020-12-21 PROCEDURE — 999N000136 HC STATISTIC PRE PROC ASSESS II: Performed by: ORTHOPAEDIC SURGERY

## 2020-12-21 PROCEDURE — 360N000030 HC SURGERY LEVEL 4 W FLUORO 1ST 30 MIN: Performed by: ORTHOPAEDIC SURGERY

## 2020-12-21 PROCEDURE — 85018 HEMOGLOBIN: CPT | Performed by: INTERNAL MEDICINE

## 2020-12-21 PROCEDURE — C1713 ANCHOR/SCREW BN/BN,TIS/BN: HCPCS | Performed by: ORTHOPAEDIC SURGERY

## 2020-12-21 PROCEDURE — 250N000013 HC RX MED GY IP 250 OP 250 PS 637: Performed by: INTERNAL MEDICINE

## 2020-12-21 PROCEDURE — 81001 URINALYSIS AUTO W/SCOPE: CPT | Performed by: INTERNAL MEDICINE

## 2020-12-21 PROCEDURE — G0378 HOSPITAL OBSERVATION PER HR: HCPCS

## 2020-12-21 PROCEDURE — 250N000013 HC RX MED GY IP 250 OP 250 PS 637: Performed by: PHYSICIAN ASSISTANT

## 2020-12-21 PROCEDURE — 250N000009 HC RX 250: Performed by: NURSE ANESTHETIST, CERTIFIED REGISTERED

## 2020-12-21 PROCEDURE — 87186 SC STD MICRODIL/AGAR DIL: CPT | Performed by: INTERNAL MEDICINE

## 2020-12-21 PROCEDURE — 250N000011 HC RX IP 250 OP 636: Performed by: PHYSICIAN ASSISTANT

## 2020-12-21 PROCEDURE — 360N000027 HC SURGERY LEVEL 4 EA 15 ADDTL MIN: Performed by: ORTHOPAEDIC SURGERY

## 2020-12-21 PROCEDURE — 370N000001 HC ANESTHESIA TECHNICAL FEE, 1ST 30 MIN: Performed by: ORTHOPAEDIC SURGERY

## 2020-12-21 PROCEDURE — 271N000001 HC OR GENERAL SUPPLY NON-STERILE: Performed by: ORTHOPAEDIC SURGERY

## 2020-12-21 PROCEDURE — 258N000003 HC RX IP 258 OP 636: Performed by: ANESTHESIOLOGY

## 2020-12-21 PROCEDURE — 250N000011 HC RX IP 250 OP 636: Performed by: NURSE ANESTHETIST, CERTIFIED REGISTERED

## 2020-12-21 PROCEDURE — 370N000002 HC ANESTHESIA TECHNICAL FEE, EACH ADDTL 15 MIN: Performed by: ORTHOPAEDIC SURGERY

## 2020-12-21 PROCEDURE — 761N000001 HC RECOVERY PHASE 1 LEVEL 1 FIRST HR: Performed by: ORTHOPAEDIC SURGERY

## 2020-12-21 PROCEDURE — 258N000003 HC RX IP 258 OP 636: Performed by: INTERNAL MEDICINE

## 2020-12-21 PROCEDURE — 96375 TX/PRO/DX INJ NEW DRUG ADDON: CPT | Mod: 59

## 2020-12-21 PROCEDURE — 99225 PR SUBSEQUENT OBSERVATION CARE,LEVEL II: CPT | Performed by: INTERNAL MEDICINE

## 2020-12-21 PROCEDURE — 250N000009 HC RX 250: Performed by: ORTHOPAEDIC SURGERY

## 2020-12-21 PROCEDURE — P9041 ALBUMIN (HUMAN),5%, 50ML: HCPCS | Performed by: NURSE ANESTHETIST, CERTIFIED REGISTERED

## 2020-12-21 PROCEDURE — 250N000009 HC RX 250: Performed by: ANESTHESIOLOGY

## 2020-12-21 PROCEDURE — 250N000011 HC RX IP 250 OP 636: Performed by: ANESTHESIOLOGY

## 2020-12-21 PROCEDURE — 761N000002 HC RECOVERY PHASE 1 LEVEL 1 EA ADDTL HR: Performed by: ORTHOPAEDIC SURGERY

## 2020-12-21 PROCEDURE — C1769 GUIDE WIRE: HCPCS | Performed by: ORTHOPAEDIC SURGERY

## 2020-12-21 PROCEDURE — P9016 RBC LEUKOCYTES REDUCED: HCPCS | Performed by: EMERGENCY MEDICINE

## 2020-12-21 PROCEDURE — 272N000002 HC OR SUPPLY OTHER OPNP: Performed by: ORTHOPAEDIC SURGERY

## 2020-12-21 PROCEDURE — 85018 HEMOGLOBIN: CPT | Performed by: ANESTHESIOLOGY

## 2020-12-21 PROCEDURE — 258N000003 HC RX IP 258 OP 636: Performed by: NURSE ANESTHETIST, CERTIFIED REGISTERED

## 2020-12-21 PROCEDURE — 87088 URINE BACTERIA CULTURE: CPT | Performed by: INTERNAL MEDICINE

## 2020-12-21 PROCEDURE — 96361 HYDRATE IV INFUSION ADD-ON: CPT

## 2020-12-21 PROCEDURE — 36415 COLL VENOUS BLD VENIPUNCTURE: CPT | Performed by: ANESTHESIOLOGY

## 2020-12-21 PROCEDURE — 250N000011 HC RX IP 250 OP 636: Performed by: ORTHOPAEDIC SURGERY

## 2020-12-21 PROCEDURE — 272N000001 HC OR GENERAL SUPPLY STERILE: Performed by: ORTHOPAEDIC SURGERY

## 2020-12-21 PROCEDURE — 999N000179 XR SURGERY CARM FLUORO LESS THAN 5 MIN W STILLS

## 2020-12-21 PROCEDURE — L1832 KO ADJ JNT POS R SUP PRE CST: HCPCS

## 2020-12-21 DEVICE — IMP SCR SYN 5.0 TI LOCK T25 STARDRIVE 40MM 04.005.530S: Type: IMPLANTABLE DEVICE | Site: TIBIA | Status: FUNCTIONAL

## 2020-12-21 DEVICE — IMP ANCHOR ARTHREX BIO-SWIVELOCK 4.75X22MM AR-2324BCC-2: Type: IMPLANTABLE DEVICE | Site: LEG | Status: FUNCTIONAL

## 2020-12-21 DEVICE — IMP SCR SYN 5.0 TI LOCK T25 STARDRIVE 36MM 04.005.526S: Type: IMPLANTABLE DEVICE | Site: TIBIA | Status: FUNCTIONAL

## 2020-12-21 DEVICE — IMPLANTABLE DEVICE: Type: IMPLANTABLE DEVICE | Site: TIBIA | Status: FUNCTIONAL

## 2020-12-21 DEVICE — IMP SCR SYN 5.0 TI LOCK T25 STARDRIVE 46MM 04.005.536S: Type: IMPLANTABLE DEVICE | Site: TIBIA | Status: FUNCTIONAL

## 2020-12-21 RX ORDER — PROPOFOL 10 MG/ML
INJECTION, EMULSION INTRAVENOUS PRN
Status: DISCONTINUED | OUTPATIENT
Start: 2020-12-21 | End: 2020-12-21

## 2020-12-21 RX ORDER — CEFTRIAXONE 1 G/1
1 INJECTION, POWDER, FOR SOLUTION INTRAMUSCULAR; INTRAVENOUS EVERY 24 HOURS
Status: DISCONTINUED | OUTPATIENT
Start: 2020-12-21 | End: 2020-12-23 | Stop reason: HOSPADM

## 2020-12-21 RX ORDER — SODIUM CHLORIDE, SODIUM LACTATE, POTASSIUM CHLORIDE, CALCIUM CHLORIDE 600; 310; 30; 20 MG/100ML; MG/100ML; MG/100ML; MG/100ML
INJECTION, SOLUTION INTRAVENOUS CONTINUOUS
Status: DISCONTINUED | OUTPATIENT
Start: 2020-12-21 | End: 2020-12-21 | Stop reason: HOSPADM

## 2020-12-21 RX ORDER — MAGNESIUM HYDROXIDE 1200 MG/15ML
LIQUID ORAL PRN
Status: DISCONTINUED | OUTPATIENT
Start: 2020-12-21 | End: 2020-12-21 | Stop reason: HOSPADM

## 2020-12-21 RX ORDER — FENTANYL CITRATE 50 UG/ML
INJECTION, SOLUTION INTRAMUSCULAR; INTRAVENOUS PRN
Status: DISCONTINUED | OUTPATIENT
Start: 2020-12-21 | End: 2020-12-21

## 2020-12-21 RX ORDER — LIDOCAINE 40 MG/G
CREAM TOPICAL
Status: DISCONTINUED | OUTPATIENT
Start: 2020-12-21 | End: 2020-12-21 | Stop reason: HOSPADM

## 2020-12-21 RX ORDER — LIDOCAINE 40 MG/G
CREAM TOPICAL
Status: DISCONTINUED | OUTPATIENT
Start: 2020-12-21 | End: 2020-12-23 | Stop reason: HOSPADM

## 2020-12-21 RX ORDER — OXYCODONE HYDROCHLORIDE 5 MG/1
5 TABLET ORAL
Status: DISCONTINUED | OUTPATIENT
Start: 2020-12-21 | End: 2020-12-23 | Stop reason: HOSPADM

## 2020-12-21 RX ORDER — OXYCODONE HYDROCHLORIDE 5 MG/1
5 TABLET ORAL EVERY 4 HOURS PRN
Status: DISCONTINUED | OUTPATIENT
Start: 2020-12-21 | End: 2020-12-21

## 2020-12-21 RX ORDER — CEFAZOLIN SODIUM 1 G/50ML
1 INJECTION, SOLUTION INTRAVENOUS EVERY 8 HOURS
Status: COMPLETED | OUTPATIENT
Start: 2020-12-21 | End: 2020-12-22

## 2020-12-21 RX ORDER — MEPERIDINE HYDROCHLORIDE 25 MG/ML
12.5 INJECTION INTRAMUSCULAR; INTRAVENOUS; SUBCUTANEOUS
Status: DISCONTINUED | OUTPATIENT
Start: 2020-12-21 | End: 2020-12-21 | Stop reason: HOSPADM

## 2020-12-21 RX ORDER — NALOXONE HYDROCHLORIDE 0.4 MG/ML
0.2 INJECTION, SOLUTION INTRAMUSCULAR; INTRAVENOUS; SUBCUTANEOUS
Status: DISCONTINUED | OUTPATIENT
Start: 2020-12-21 | End: 2020-12-21 | Stop reason: HOSPADM

## 2020-12-21 RX ORDER — ONDANSETRON 4 MG/1
4 TABLET, ORALLY DISINTEGRATING ORAL EVERY 30 MIN PRN
Status: DISCONTINUED | OUTPATIENT
Start: 2020-12-21 | End: 2020-12-21 | Stop reason: HOSPADM

## 2020-12-21 RX ORDER — ACETAMINOPHEN 325 MG/1
650 TABLET ORAL EVERY 4 HOURS PRN
Status: DISCONTINUED | OUTPATIENT
Start: 2020-12-24 | End: 2020-12-23 | Stop reason: HOSPADM

## 2020-12-21 RX ORDER — OXYCODONE HYDROCHLORIDE 5 MG/1
10 TABLET ORAL EVERY 4 HOURS PRN
Status: DISCONTINUED | OUTPATIENT
Start: 2020-12-21 | End: 2020-12-21

## 2020-12-21 RX ORDER — AMOXICILLIN 250 MG
1 CAPSULE ORAL 2 TIMES DAILY
Status: DISCONTINUED | OUTPATIENT
Start: 2020-12-21 | End: 2020-12-23 | Stop reason: HOSPADM

## 2020-12-21 RX ORDER — CEFAZOLIN SODIUM 1 G/3ML
1 INJECTION, POWDER, FOR SOLUTION INTRAMUSCULAR; INTRAVENOUS SEE ADMIN INSTRUCTIONS
Status: DISCONTINUED | OUTPATIENT
Start: 2020-12-21 | End: 2020-12-21 | Stop reason: HOSPADM

## 2020-12-21 RX ORDER — LABETALOL 20 MG/4 ML (5 MG/ML) INTRAVENOUS SYRINGE
10
Status: DISCONTINUED | OUTPATIENT
Start: 2020-12-21 | End: 2020-12-21 | Stop reason: HOSPADM

## 2020-12-21 RX ORDER — PROPOFOL 10 MG/ML
INJECTION, EMULSION INTRAVENOUS CONTINUOUS PRN
Status: DISCONTINUED | OUTPATIENT
Start: 2020-12-21 | End: 2020-12-21

## 2020-12-21 RX ORDER — CEFAZOLIN SODIUM 2 G/100ML
2 INJECTION, SOLUTION INTRAVENOUS
Status: COMPLETED | OUTPATIENT
Start: 2020-12-21 | End: 2020-12-21

## 2020-12-21 RX ORDER — HYDROMORPHONE HYDROCHLORIDE 1 MG/ML
0.4 INJECTION, SOLUTION INTRAMUSCULAR; INTRAVENOUS; SUBCUTANEOUS
Status: DISCONTINUED | OUTPATIENT
Start: 2020-12-21 | End: 2020-12-21

## 2020-12-21 RX ORDER — FENTANYL CITRATE 50 UG/ML
25-50 INJECTION, SOLUTION INTRAMUSCULAR; INTRAVENOUS
Status: DISCONTINUED | OUTPATIENT
Start: 2020-12-21 | End: 2020-12-21 | Stop reason: HOSPADM

## 2020-12-21 RX ORDER — HYDROMORPHONE HYDROCHLORIDE 1 MG/ML
.3-.5 INJECTION, SOLUTION INTRAMUSCULAR; INTRAVENOUS; SUBCUTANEOUS EVERY 10 MIN PRN
Status: DISCONTINUED | OUTPATIENT
Start: 2020-12-21 | End: 2020-12-21 | Stop reason: HOSPADM

## 2020-12-21 RX ORDER — ACETAMINOPHEN 325 MG/1
975 TABLET ORAL EVERY 8 HOURS
Status: DISCONTINUED | OUTPATIENT
Start: 2020-12-21 | End: 2020-12-23 | Stop reason: HOSPADM

## 2020-12-21 RX ORDER — POLYETHYLENE GLYCOL 3350 17 G/17G
17 POWDER, FOR SOLUTION ORAL DAILY
Status: DISCONTINUED | OUTPATIENT
Start: 2020-12-22 | End: 2020-12-23 | Stop reason: HOSPADM

## 2020-12-21 RX ORDER — ALBUTEROL SULFATE 0.83 MG/ML
2.5 SOLUTION RESPIRATORY (INHALATION) EVERY 4 HOURS PRN
Status: DISCONTINUED | OUTPATIENT
Start: 2020-12-21 | End: 2020-12-21 | Stop reason: HOSPADM

## 2020-12-21 RX ORDER — NALOXONE HYDROCHLORIDE 0.4 MG/ML
0.4 INJECTION, SOLUTION INTRAMUSCULAR; INTRAVENOUS; SUBCUTANEOUS
Status: DISCONTINUED | OUTPATIENT
Start: 2020-12-21 | End: 2020-12-21 | Stop reason: HOSPADM

## 2020-12-21 RX ORDER — DIAZEPAM 10 MG/2ML
2.5 INJECTION, SOLUTION INTRAMUSCULAR; INTRAVENOUS
Status: DISCONTINUED | OUTPATIENT
Start: 2020-12-21 | End: 2020-12-21 | Stop reason: HOSPADM

## 2020-12-21 RX ORDER — HYDROMORPHONE HYDROCHLORIDE 1 MG/ML
0.2 INJECTION, SOLUTION INTRAMUSCULAR; INTRAVENOUS; SUBCUTANEOUS
Status: DISCONTINUED | OUTPATIENT
Start: 2020-12-21 | End: 2020-12-23 | Stop reason: HOSPADM

## 2020-12-21 RX ORDER — DOCUSATE SODIUM 100 MG/1
100 CAPSULE, LIQUID FILLED ORAL 2 TIMES DAILY
Status: DISCONTINUED | OUTPATIENT
Start: 2020-12-21 | End: 2020-12-23 | Stop reason: HOSPADM

## 2020-12-21 RX ORDER — ALBUMIN, HUMAN INJ 5% 5 %
SOLUTION INTRAVENOUS CONTINUOUS PRN
Status: DISCONTINUED | OUTPATIENT
Start: 2020-12-21 | End: 2020-12-21

## 2020-12-21 RX ORDER — HYDRALAZINE HYDROCHLORIDE 20 MG/ML
2.5-5 INJECTION INTRAMUSCULAR; INTRAVENOUS EVERY 10 MIN PRN
Status: DISCONTINUED | OUTPATIENT
Start: 2020-12-21 | End: 2020-12-21 | Stop reason: HOSPADM

## 2020-12-21 RX ORDER — PHENYLEPHRINE HYDROCHLORIDE 10 MG/ML
INJECTION INTRAVENOUS PRN
Status: DISCONTINUED | OUTPATIENT
Start: 2020-12-21 | End: 2020-12-21

## 2020-12-21 RX ORDER — SCOLOPAMINE TRANSDERMAL SYSTEM 1 MG/1
1 PATCH, EXTENDED RELEASE TRANSDERMAL
Status: DISCONTINUED | OUTPATIENT
Start: 2020-12-21 | End: 2020-12-21 | Stop reason: HOSPADM

## 2020-12-21 RX ORDER — BISACODYL 10 MG
10 SUPPOSITORY, RECTAL RECTAL DAILY PRN
Status: DISCONTINUED | OUTPATIENT
Start: 2020-12-21 | End: 2020-12-23 | Stop reason: HOSPADM

## 2020-12-21 RX ORDER — ONDANSETRON 2 MG/ML
4 INJECTION INTRAMUSCULAR; INTRAVENOUS EVERY 30 MIN PRN
Status: DISCONTINUED | OUTPATIENT
Start: 2020-12-21 | End: 2020-12-21 | Stop reason: HOSPADM

## 2020-12-21 RX ORDER — DEXAMETHASONE SODIUM PHOSPHATE 4 MG/ML
INJECTION, SOLUTION INTRA-ARTICULAR; INTRALESIONAL; INTRAMUSCULAR; INTRAVENOUS; SOFT TISSUE PRN
Status: DISCONTINUED | OUTPATIENT
Start: 2020-12-21 | End: 2020-12-21

## 2020-12-21 RX ORDER — ONDANSETRON 2 MG/ML
4 INJECTION INTRAMUSCULAR; INTRAVENOUS EVERY 6 HOURS PRN
Status: DISCONTINUED | OUTPATIENT
Start: 2020-12-21 | End: 2020-12-23 | Stop reason: HOSPADM

## 2020-12-21 RX ORDER — ONDANSETRON 4 MG/1
4 TABLET, ORALLY DISINTEGRATING ORAL EVERY 6 HOURS PRN
Status: DISCONTINUED | OUTPATIENT
Start: 2020-12-21 | End: 2020-12-23 | Stop reason: HOSPADM

## 2020-12-21 RX ORDER — SODIUM CHLORIDE, SODIUM LACTATE, POTASSIUM CHLORIDE, CALCIUM CHLORIDE 600; 310; 30; 20 MG/100ML; MG/100ML; MG/100ML; MG/100ML
INJECTION, SOLUTION INTRAVENOUS CONTINUOUS
Status: DISCONTINUED | OUTPATIENT
Start: 2020-12-21 | End: 2020-12-22

## 2020-12-21 RX ORDER — PROCHLORPERAZINE MALEATE 5 MG
5 TABLET ORAL EVERY 6 HOURS PRN
Status: DISCONTINUED | OUTPATIENT
Start: 2020-12-21 | End: 2020-12-21

## 2020-12-21 RX ORDER — ACETAMINOPHEN 325 MG/1
650 TABLET ORAL ONCE
Status: DISCONTINUED | OUTPATIENT
Start: 2020-12-21 | End: 2020-12-21 | Stop reason: HOSPADM

## 2020-12-21 RX ADMIN — TRANEXAMIC ACID 1 G: 1 INJECTION, SOLUTION INTRAVENOUS at 09:51

## 2020-12-21 RX ADMIN — Medication 50 MCG: at 21:43

## 2020-12-21 RX ADMIN — ROSUVASTATIN CALCIUM 20 MG: 5 TABLET, FILM COATED ORAL at 21:39

## 2020-12-21 RX ADMIN — ACETAMINOPHEN 650 MG: 325 TABLET, FILM COATED ORAL at 01:49

## 2020-12-21 RX ADMIN — LEVOTHYROXINE SODIUM 50 MCG: 50 TABLET ORAL at 06:03

## 2020-12-21 RX ADMIN — PHENYLEPHRINE HYDROCHLORIDE 100 MCG: 10 INJECTION INTRAVENOUS at 09:18

## 2020-12-21 RX ADMIN — PROPOFOL 150 MG: 10 INJECTION, EMULSION INTRAVENOUS at 07:37

## 2020-12-21 RX ADMIN — TOLTERODINE TARTRATE 2 MG: 2 CAPSULE, EXTENDED RELEASE ORAL at 21:41

## 2020-12-21 RX ADMIN — PHENYLEPHRINE HYDROCHLORIDE 100 MCG: 10 INJECTION INTRAVENOUS at 08:32

## 2020-12-21 RX ADMIN — ALBUMIN HUMAN: 0.05 INJECTION, SOLUTION INTRAVENOUS at 08:26

## 2020-12-21 RX ADMIN — HYDROMORPHONE HYDROCHLORIDE 0.5 MG: 1 INJECTION, SOLUTION INTRAMUSCULAR; INTRAVENOUS; SUBCUTANEOUS at 08:09

## 2020-12-21 RX ADMIN — DOCUSATE SODIUM AND SENNOSIDES 1 TABLET: 8.6; 5 TABLET ORAL at 21:42

## 2020-12-21 RX ADMIN — PHENYLEPHRINE HYDROCHLORIDE 50 MCG: 10 INJECTION INTRAVENOUS at 09:08

## 2020-12-21 RX ADMIN — CEFTRIAXONE 1 G: 1 INJECTION, POWDER, FOR SOLUTION INTRAMUSCULAR; INTRAVENOUS at 18:55

## 2020-12-21 RX ADMIN — LIDOCAINE HYDROCHLORIDE 50 MG: 10 INJECTION, SOLUTION EPIDURAL; INFILTRATION; INTRACAUDAL; PERINEURAL at 07:37

## 2020-12-21 RX ADMIN — SODIUM CHLORIDE, POTASSIUM CHLORIDE, SODIUM LACTATE AND CALCIUM CHLORIDE: 600; 310; 30; 20 INJECTION, SOLUTION INTRAVENOUS at 13:00

## 2020-12-21 RX ADMIN — PHENYLEPHRINE HYDROCHLORIDE 100 MCG: 10 INJECTION INTRAVENOUS at 08:27

## 2020-12-21 RX ADMIN — PHENYLEPHRINE HYDROCHLORIDE 100 MCG: 10 INJECTION INTRAVENOUS at 08:18

## 2020-12-21 RX ADMIN — FENTANYL CITRATE 100 MCG: 50 INJECTION, SOLUTION INTRAMUSCULAR; INTRAVENOUS at 07:37

## 2020-12-21 RX ADMIN — DOCUSATE SODIUM 100 MG: 100 CAPSULE, LIQUID FILLED ORAL at 21:42

## 2020-12-21 RX ADMIN — SODIUM CHLORIDE, POTASSIUM CHLORIDE, SODIUM LACTATE AND CALCIUM CHLORIDE: 600; 310; 30; 20 INJECTION, SOLUTION INTRAVENOUS at 22:59

## 2020-12-21 RX ADMIN — PROPOFOL 75 MCG/KG/MIN: 10 INJECTION, EMULSION INTRAVENOUS at 07:49

## 2020-12-21 RX ADMIN — SODIUM CHLORIDE: 9 INJECTION, SOLUTION INTRAVENOUS at 01:51

## 2020-12-21 RX ADMIN — PHENYLEPHRINE HYDROCHLORIDE 100 MCG: 10 INJECTION INTRAVENOUS at 09:13

## 2020-12-21 RX ADMIN — DEXAMETHASONE SODIUM PHOSPHATE 4 MG: 4 INJECTION, SOLUTION INTRA-ARTICULAR; INTRALESIONAL; INTRAMUSCULAR; INTRAVENOUS; SOFT TISSUE at 07:37

## 2020-12-21 RX ADMIN — PHENYLEPHRINE HYDROCHLORIDE 100 MCG: 10 INJECTION INTRAVENOUS at 09:09

## 2020-12-21 RX ADMIN — PROCHLORPERAZINE EDISYLATE 5 MG: 5 INJECTION INTRAMUSCULAR; INTRAVENOUS at 11:36

## 2020-12-21 RX ADMIN — PHENYLEPHRINE HYDROCHLORIDE 200 MCG: 10 INJECTION INTRAVENOUS at 08:52

## 2020-12-21 RX ADMIN — HYDROMORPHONE HYDROCHLORIDE 0.5 MG: 1 INJECTION, SOLUTION INTRAMUSCULAR; INTRAVENOUS; SUBCUTANEOUS at 09:38

## 2020-12-21 RX ADMIN — SODIUM CHLORIDE, POTASSIUM CHLORIDE, SODIUM LACTATE AND CALCIUM CHLORIDE: 600; 310; 30; 20 INJECTION, SOLUTION INTRAVENOUS at 08:35

## 2020-12-21 RX ADMIN — CEFAZOLIN SODIUM 1 G: 1 INJECTION, SOLUTION INTRAVENOUS at 17:48

## 2020-12-21 RX ADMIN — PHENYLEPHRINE HYDROCHLORIDE 100 MCG: 10 INJECTION INTRAVENOUS at 08:49

## 2020-12-21 RX ADMIN — ACETAMINOPHEN 975 MG: 325 TABLET, FILM COATED ORAL at 15:52

## 2020-12-21 RX ADMIN — PHENYLEPHRINE HYDROCHLORIDE 100 MCG: 10 INJECTION INTRAVENOUS at 08:40

## 2020-12-21 RX ADMIN — CEFAZOLIN 1 G: 1 INJECTION, POWDER, FOR SOLUTION INTRAMUSCULAR; INTRAVENOUS at 09:45

## 2020-12-21 RX ADMIN — ALBUMIN HUMAN: 0.05 INJECTION, SOLUTION INTRAVENOUS at 09:07

## 2020-12-21 RX ADMIN — CEFAZOLIN SODIUM 2 G: 2 INJECTION, SOLUTION INTRAVENOUS at 07:45

## 2020-12-21 RX ADMIN — PHENYLEPHRINE HYDROCHLORIDE 150 MCG: 10 INJECTION INTRAVENOUS at 09:21

## 2020-12-21 RX ADMIN — SCOPALAMINE 1 PATCH: 1 PATCH, EXTENDED RELEASE TRANSDERMAL at 07:03

## 2020-12-21 RX ADMIN — Medication 1 LOZENGE: at 22:54

## 2020-12-21 RX ADMIN — FENTANYL CITRATE 50 MCG: 50 INJECTION, SOLUTION INTRAMUSCULAR; INTRAVENOUS at 11:22

## 2020-12-21 RX ADMIN — PHENYLEPHRINE HYDROCHLORIDE 100 MCG: 10 INJECTION INTRAVENOUS at 08:45

## 2020-12-21 RX ADMIN — PHENYLEPHRINE HYDROCHLORIDE 100 MCG: 10 INJECTION INTRAVENOUS at 08:13

## 2020-12-21 RX ADMIN — SODIUM CHLORIDE: 9 INJECTION, SOLUTION INTRAVENOUS at 09:19

## 2020-12-21 RX ADMIN — PHENYLEPHRINE HYDROCHLORIDE 100 MCG: 10 INJECTION INTRAVENOUS at 07:40

## 2020-12-21 RX ADMIN — ONDANSETRON HYDROCHLORIDE 4 MG: 2 INJECTION, SOLUTION INTRAMUSCULAR; INTRAVENOUS at 10:58

## 2020-12-21 RX ADMIN — PHENYLEPHRINE HYDROCHLORIDE 100 MCG: 10 INJECTION INTRAVENOUS at 07:44

## 2020-12-21 RX ADMIN — PHENYLEPHRINE HYDROCHLORIDE 100 MCG: 10 INJECTION INTRAVENOUS at 08:36

## 2020-12-21 NOTE — CONSULTS
[facility]    Orthopedics Consultation    Date of Admission:  12/19/2020    Assessment & Plan   Angeli Jacobson is a 70 year old female who was admitted on 12/19/2020. I was asked to see the patient for left lower extremity pain.  She has a displaced left patella fracture with extensor mechanism not intact.  She also has a left tibial shaft fracture.    N.p.o.  Nonweightbearing left lower extremity  Optimization per hospitalist team, currently medically optimized  To the OR for ORIF left patella and IM nail left tibia  Pain control, continue current regimen  Patient takes Plavix at baseline, her last dose was Saturday morning 2 days prior to surgery        Stephen Rhodes MD    Code Status    Full Code    Reason for Consult   Reason for consult: Left lower extremity pain    Primary Care Physician   Sepideh Burris    History of Present Illness   Angeli Jacobson is a 70 year old female who presents with with a chief complaint of left lower extremity pain after a ground-level fall.  She did slip on ice.  She had immediate pain and was unable to bear weight.  She has a very significant medical history on this left lower extremity.  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  She has significant lymphedema in the left lower extremity from this.  She has also had multiple procedures on her femur.  Today she complains of significant pain in the distal left lower extremity including over her patella and in her tibia.  She denies any numbness or tingling currently.    MEDS:   No current outpatient medications on file.       PAST MEDICAL HISTORY:   Past Medical History:   Diagnosis Date     * * * SBE PROPHYLAXIS * * * 1998    Amox 500mg, take 4 tabs one hour prior to procedure.Takes this because of lymphedema secondary from leg surgey     Central serous retinopathy 2001    Resolved 9/2001     CHRONIC NECK PAIN 1995     Depressive disorder      Depressive disorder, not elsewhere  classified 2001     History of blood transfusion 04/2019    during left hip pinning     MIXED HYPERLIPIDEMIA, LDL GOAL <160 1998    LDL goal < 160     Motion sickness      MYXOID LIPOSARCOMA 2000    Left thigh, S/P excision, radiation  at [facility]     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    CHRONIC LEFT THIGH LYMPHEDEMA     Nontoxic multinodular goiter 2005    needs yearly US     Osteoporosis, unspecified 2001     Other lymphedema 2000    left thigh, gets regular PT for this     PAD (peripheral artery disease) (H) 4/20/2018     PONV (postoperative nausea and vomiting)      SHINGLES 2001     Sprain of lumbosacral (joint) (ligament) 1995    right     Unspecified hearing loss 1998    chronic tinnitus     Unspecified tinnitus 1998       PAST SURGICAL HISTORY:   Past Surgical History:   Procedure Laterality Date     ARTHROPLASTY HIP Left 10/4/2019    Procedure: Removal of left femoral hardware with a conversion to left total hip arthroplasty using a Biomet Annalisa femoral stem with an OsseoTi acetabular shell and a dual mobility bearing surface;  Surgeon: Spencer Celeste MD;  Location: [facility] OR     BYPASS GRAFT FEMOROPOPLITEAL Left 1/21/2019    Procedure: LEFT FEMORAL TO ABOVE KNEE POPLITEAL  BYPASS WITH POLYTETRAFLUOROETHYLENE GRAFT;  Surgeon: Shade Owens MD;  Location:  OR     C APPENDECTOMY      Appendectomy     COLONOSCOPY N/A 2/5/2016    Procedure: COMBINED COLONOSCOPY, SINGLE OR MULTIPLE BIOPSY/POLYPECTOMY BY BIOPSY;  Surgeon: Varun Stanley MD, MD;  Location:  GI     CYSTOSCOPY       EXCISION MALIG LESION>1.25CM  5/2000    Myxoid Liposarcoma       EXPLORE GROIN Right 5/1/2018    Procedure: EXPLORE GROIN;  EMERGENCY RIGHT FEMORAL EXPLORATION WITH FEMORAL ARTERY REPAIR.    EBL: 50mL;  Surgeon: Shade Owens MD;  Location:  OR     HC COLONOSCOPY THRU STOMA, DIAGNOSTIC  2006    due 2010     HC COLP CERVIX/UPPER VAGINA  07/1997    Negative     HC DILATION/CURETTAGE DIAG/THER NON OB  02/1997    Post  menopausal bleeding on HRT, negative     HIP SURGERY Left 2019     IR ANGIOGRAM THROUGH CATHETER FOLLOW UP  2018     IR ANGIOGRAM THROUGH CATHETER FOLLOW UP  2018     IR LOWER EXTREMITY ANGIOGRAM LEFT  2018     VASCULAR SURGERY  2018    Left SFA stent in bypass graft     ZZC NONSPECIFIC PROCEDURE  2000    Open Biopsy Left Thigh Liposarcoma       FAMILY HISTORY:   Family History   Problem Relation Age of Onset     C.A.D. Father         MI 57     Alcohol/Drug Father         etoh     Obesity Mother      Osteoporosis Mother      Colon Cancer Brother 70     Hyperlipidemia Son      Hyperlipidemia Son         very high, experimental drug     C.A.D. Paternal Grandmother         ascvd     Diabetes Maternal Grandmother      Cancer Maternal Grandmother      C.A.D. Paternal Uncle         Mi  age 48     Cancer Maternal Aunt         pancreatic CA       SOCIAL HISTORY:   Social History     Tobacco Use     Smoking status: Never Smoker     Smokeless tobacco: Never Used   Substance Use Topics     Alcohol use: No     Frequency: Monthly or less     Drinks per session: 1 or 2     Binge frequency: Never       ALLERGIES:    Allergies   Allergen Reactions     Celexa [Citalopram Hydrobromide]      Decreased libido       ROS:  10 point ROS neg other than the symptoms noted above in the HPI.    Physical Exam   Temp: 97.9  F (36.6  C) Temp src: Temporal BP: 120/65 Pulse: 84   Resp: 16 SpO2: 94 % O2 Device: None (Room air)    Vital Signs with Ranges  Temp:  [97.1  F (36.2  C)-98.1  F (36.7  C)] 97.9  F (36.6  C)  Pulse:  [79-84] 84  Resp:  [16] 16  BP: (111-130)/(62-76) 120/65  SpO2:  [93 %-98 %] 94 %  151 lbs 0 oz    Constitutional: Pleasant, alert, appropriate, following commands.  HEENT: Head atraumatic normocephalic. Pupils equal round and reactive to light.  Respiratory: Unlabored breathing no audible wheeze  Cardiovascular: Regular rate and rhythm  GI: Abdomen soft nontender  nondistended.  Lymph/Hematologic: No lymphadenopathy in areas examined  Genitourinary:  No thompson  Skin: No rashes, no cyanosis, no edema.  Musculoskeletal: Focused examination of the left lower extremity demonstrates sensation intact light touch distally in the foot.  She is currently in a posterior slab splint and is tender to palpation over the patella and the tibia.  Toes are able to move up and down.  She does have good capillary refill less than 2 seconds.  Neurologic: normal without focal findings, mental status, speech normal, alert and oriented x iii, JOE, reflexes normal and symmetric      Data   Results for orders placed or performed during the hospital encounter of 12/19/20 (from the past 24 hour(s))   Basic metabolic panel   Result Value Ref Range    Sodium 137 133 - 144 mmol/L    Potassium 4.1 3.4 - 5.3 mmol/L    Chloride 107 94 - 109 mmol/L    Carbon Dioxide 27 20 - 32 mmol/L    Anion Gap 3 3 - 14 mmol/L    Glucose 113 (H) 70 - 99 mg/dL    Urea Nitrogen 14 7 - 30 mg/dL    Creatinine 0.60 0.52 - 1.04 mg/dL    GFR Estimate >90 >60 mL/min/[1.73_m2]    GFR Estimate If Black >90 >60 mL/min/[1.73_m2]    Calcium 8.4 (L) 8.5 - 10.1 mg/dL   CBC with platelets differential   Result Value Ref Range    WBC 7.4 4.0 - 11.0 10e9/L    RBC Count 3.33 (L) 3.8 - 5.2 10e12/L    Hemoglobin 10.0 (L) 11.7 - 15.7 g/dL    Hematocrit 31.0 (L) 35.0 - 47.0 %    MCV 93 78 - 100 fl    MCH 30.0 26.5 - 33.0 pg    MCHC 32.3 31.5 - 36.5 g/dL    RDW 14.2 10.0 - 15.0 %    Platelet Count 214 150 - 450 10e9/L    Diff Method Automated Method     % Neutrophils 72.2 %    % Lymphocytes 18.6 %    % Monocytes 8.4 %    % Eosinophils 0.1 %    % Basophils 0.4 %    % Immature Granulocytes 0.3 %    Nucleated RBCs 0 0 /100    Absolute Neutrophil 5.3 1.6 - 8.3 10e9/L    Absolute Lymphocytes 1.4 0.8 - 5.3 10e9/L    Absolute Monocytes 0.6 0.0 - 1.3 10e9/L    Absolute Eosinophils 0.0 0.0 - 0.7 10e9/L    Absolute Basophils 0.0 0.0 - 0.2 10e9/L    Abs  Immature Granulocytes 0.0 0 - 0.4 10e9/L    Absolute Nucleated RBC 0.0    UA with Microscopic reflex to Culture    Specimen: Urine clean catch; Midstream Urine   Result Value Ref Range    Color Urine Yellow     Appearance Urine Cloudy     Glucose Urine Negative NEG^Negative mg/dL    Bilirubin Urine Negative NEG^Negative    Ketones Urine Negative NEG^Negative mg/dL    Specific Gravity Urine 1.012 1.003 - 1.035    Blood Urine Moderate (A) NEG^Negative    pH Urine 6.0 5.0 - 7.0 pH    Protein Albumin Urine 30 (A) NEG^Negative mg/dL    Urobilinogen mg/dL Normal 0.0 - 2.0 mg/dL    Nitrite Urine Positive (A) NEG^Negative    Leukocyte Esterase Urine Large (A) NEG^Negative    Source Midstream Urine     WBC Urine >182 (H) 0 - 5 /HPF    RBC Urine 135 (H) 0 - 2 /HPF    WBC Clumps Present (A) NEG^Negative /HPF    Bacteria Urine Moderate (A) NEG^Negative /HPF    Squamous Epithelial /HPF Urine 2 (H) 0 - 1 /HPF    Mucous Urine Present (A) NEG^Negative /LPF

## 2020-12-21 NOTE — ANESTHESIA POSTPROCEDURE EVALUATION
Patient: Angeli Jacobson    Procedure(s):  OPEN REDUCTION INTERNAL FIXATION, FRACTURE, TIBIA, USING INTRAMEDULLARY DARA LEFT  Open reductiojn internal fixation left patella fracture  OPEN REDUCTION INTERNAL FIXATION, FRACTURE, PATELLA    Diagnosis:Tibia fracture [S82.209A]  Diagnosis Additional Information: No value filed.    Anesthesia Type:  General    Note:  Anesthesia Post Evaluation    Patient location during evaluation: PACU  Patient participation: Able to fully participate in evaluation  Level of consciousness: awake  Pain management: adequate  Airway patency: patent  Cardiovascular status: acceptable  Respiratory status: acceptable  Hydration status: acceptable  PONV: controlled     Anesthetic complications: None          Last vitals:  Vitals:    12/21/20 1324 12/21/20 1438 12/21/20 1529   BP: 116/69 126/64 117/51   Pulse: 77 65 73   Resp: 10 10 18   Temp:   97.3  F (36.3  C)   SpO2: 100% 98% 99%         Electronically Signed By: Berry Flores MD  December 21, 2020  3:35 PM

## 2020-12-21 NOTE — ANESTHESIA CARE TRANSFER NOTE
Patient: Angeli Jacobson    Procedure(s):  OPEN REDUCTION INTERNAL FIXATION, FRACTURE, TIBIA, USING INTRAMEDULLARY DARA LEFT  Open reductiojn internal fixation left patella fracture  OPEN REDUCTION INTERNAL FIXATION, FRACTURE, PATELLA    Diagnosis: Tibia fracture [S82.209A]  Diagnosis Additional Information: No value filed.    Anesthesia Type:   General     Note:  Airway :Face Mask  Patient transferred to:PACU  Comments: Patient with spontaneous respirations and adequate tidal volumes. Patient awake and responsive. LMA removed in OR to 6L facemask. To PACU ventilating well. VSS. Report given.Handoff Report: Identifed the Patient, Identified the Reponsible Provider, Reviewed the pertinent medical history, Discussed the surgical course, Reviewed Intra-OP anesthesia mangement and issues during anesthesia, Set expectations for post-procedure period and Allowed opportunity for questions and acknowledgement of understanding      Vitals: (Last set prior to Anesthesia Care Transfer)    CRNA VITALS  12/21/2020 0947 - 12/21/2020 1029      12/21/2020             NIBP:  122/69    NIBP Mean:  93                Electronically Signed By: ELVA Novoa CRNA  December 21, 2020  10:29 AM

## 2020-12-21 NOTE — PLAN OF CARE
Patient vital signs are at baseline: Yes  Patient able to ambulate as they were prior to admission or with assist devices provided by therapies during their stay:  No,  Reason:  bedrest  Patient MUST void prior to discharge:  Yes  Patient able to tolerate oral intake:  No,  Reason:  NPO since midnight for surgery  Pain has adequate pain control using Oral analgesics:  Yes    PT is A&Ox4. On room air. Pain managed with ice and Tylenol. CMS intact. ACE/Splint to LLE is CDI. Strong dorsi-plantar and +2 pulses. Scant drainage to scab on head; open to air and will continue to monitor. PT has been NPO since midnight. IVF infusing. Weight shift assistance with A-2; bedrest. Hakan wipes done. Voiding spontaneously with purewick. PT complained of burning sensation with voiding. Urine sample sent.    PT left floor at 0617 going to PACU escorted by CNA. Plan of care reviewed with patient. Belongings remain in room.

## 2020-12-21 NOTE — PROGRESS NOTES
[facility]  Hospitalist Progress Note  Antonio Sharma MD, MD 12/21/2020  (Text Page)  Reason for Stay (Diagnosis): Left tibia fracture and right comminuted patellar fracture         Assessment and Plan:      Summary of Stay: Summary of Stay: Angeli Jacobson is a 70 year old female admitted on 12/19/2020 with left lower extremity fracture after slipping on ice at home.     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  She had superficial femoral arterial resection at that time as well and a graft was placed.     Problem List:   1. Left midshaft tibiofibular fracture status post fall.  2. Right comminuted patellar fracture  3. Status post open reduction intramedullary nailing of left tibia fracture and left patella partial patellar fracture excision with advancement of quadriceps tendon  4. Peripheral arterial disease managed with Plavix and aspirin.  5. Hypothyroidism  6. Dyslipidemia     Continue current care.  I am anticipating Ms. Jacobson might need at least another 1-2 more days of hospitalization care given be needing therapy evaluation, optimize pain control.  -Advance diet as tolerated  -Stop IV fluids once on regular diet  -Repeat urine analysis as chart review showed earlier urine analysis done did show likely contaminated sample  -Receive antibiotics for perioperative pharmacal prophylaxis  -Home regimen for statins, Zetia, levothyroxine were resumed earlier  -Recheck hemoglobin postoperative state     DVT Prophylaxis: On mechanical PCD's, will await further instructions from orthopedic service regarding DVT prophylaxis   Code Status: Full Code  Discharge Dispo: To be determined  Estimated Disch Date / # of Days until Disch: Needs PT OT        Interval History (Subjective):      Assume service care today.  Seen and examined.  Chart reviewed.  Case discussed with nursing service.  I found her patient currently laying comfortably  and resting in bed.  Tolerated earlier surgical operative intervention.  No reported complications.  Currently afebrile, stable hemodynamics     # Pain Assessment:  Current Pain Score 12/21/2020   Patient currently in pain? sleeping, patient not able to self report   Pain score (0-10) -   Pain location -   Pain descriptors -                   Physical Exam:      Last Vital Signs:  /69 (BP Location: Right arm)   Pulse 77   Temp 97.4  F (36.3  C) (Temporal)   Resp 10   Wt 68.5 kg (151 lb)   LMP 03/18/2005   SpO2 100%   BMI 24.37 kg/m      I/O last 3 completed shifts:  In: 1917 [P.O.:240; I.V.:7257]  Out: 2950 [Urine:2950]  Wt Readings from Last 1 Encounters:   12/21/20 68.5 kg (151 lb)     Vitals:    12/21/20 0419   Weight: 68.5 kg (151 lb)       Constitutional:  Sleeping, no apparent distress   Respiratory: Clear to auscultation bilaterally, no crackles or wheezing   Cardiovascular: Regular rate and rhythm, normal S1 and S2   Abdomen: Normal bowel sounds, soft, non-distended, non-tender   Skin: No rashes, no cyanosis, dry to touch   Neuro:  Limited exam as patient is currently sedated and sleeping alert and oriented x3, no weakness, spontaneous and coherent speech   Extremities:  Dressing in place, not soak   Other(s):  Not agitated, not combative       All other systems: Negative          Medications:      All current medications were reviewed with changes reflected in problem list.         Data:      All new lab and imaging data was reviewed.   Labs:  Recent Labs   Lab 12/21/20  0450   CULT PENDING     Recent Labs   Lab 12/21/20  1230 12/21/20  0857 12/20/20  0744 12/19/20  1513   WBC  --   --  7.4 12.8*   HGB 9.6* 7.9* 10.0* 12.2   HCT  --   --  31.0* 39.3   MCV  --   --  93 95   PLT  --   --  214 283     Recent Labs   Lab 12/20/20  0744 12/19/20  1513    140   POTASSIUM 4.1 4.1   CHLORIDE 107 107   CO2 27 31   ANIONGAP 3 2*   * 133*   BUN 14 16   CR 0.60 0.54   GFRESTIMATED >90 >90    GFRESTBLACK >90 >90   LONG 8.4* 9.2     No results for input(s): SED, CRP in the last 168 hours.  Recent Labs   Lab 12/20/20  0744 12/19/20  1513   * 133*     No results for input(s): LIPASE in the last 168 hours.  No results for input(s): TROPONIN, TROPI, TROPR in the last 168 hours.    Invalid input(s): TROP, TROPONINIES  Recent Labs   Lab 12/21/20  0450   COLOR Yellow   APPEARANCE Cloudy   URINEGLC Negative   URINEBILI Negative   URINEKETONE Negative   SG 1.012   UBLD Moderate*   URINEPH 6.0   PROTEIN 30*   NITRITE Positive*   LEUKEST Large*   RBCU 135*   WBCU >182*      Imaging:   Results for orders placed or performed during the hospital encounter of 12/19/20   XR Knee Left 3 Views    Narrative    XR KNEE LT 3 VW   12/19/2020 3:05 PM     HISTORY: knee pain  COMPARISON: None.       Impression    IMPRESSION: Acute distracted-displaced transverse fracture through the  patella, larger inferior pole fragment is displaced inferiorly by 2 to  3 cm and is rotated.    Acute comminuted nondisplaced fracture of the proximal shaft and  metaphysis of the tibia, incompletely imaged. There is likely mildly  impacted fracture of the fibular neck. Cortical irregularity of the  subchondral medial distal femur posteriorly is more likely a  projectional artifact, less likely fracture.    Lipohemarthrosis. These bone demineralization. Old fractured screw in  the distal femoral shaft.    HALLEY VAZQUEZ MD   XR Femur Left 2 Views    Narrative    XR FEMUR LT 2 VW 12/19/2020 3:05 PM     HISTORY: femur pain  COMPARISON: Left hip x-ray 10/4/2019       Impression    IMPRESSION: Left hip arthroplasty stable. There is side plate and  cerclage wire fixation of old periprosthetic proximal left femur  fracture with likely healing of most of the fracture. Old fractured  screw in the distal femoral shaft. No acute femur fracture. There is  an acute displaced fracture of the patella. Diffuse bony  demineralization. Arterial  calcification and stent-graft.    HALLEY VAZQUEZ MD   Head CT w/o contrast    Narrative    CT SCAN OF THE HEAD WITHOUT CONTRAST   12/19/2020 2:47 PM     HISTORY: Head trauma, minor, GCS>=13, low clinical risk, initial exam.    TECHNIQUE:  Axial images of the head and coronal reformations without  IV contrast material. Radiation dose for this scan was reduced using  automated exposure control, adjustment of the mA and/or kV according  to patient size, or iterative reconstruction technique.    COMPARISON: Head MRI 6/4/2019, head CT 4/13/2006.    FINDINGS: Mild volume loss is present. White matter hypoattenuation  likely represents mild chronic small vessel ischemic change. The  cerebral hemispheres, brainstem, and cerebellum otherwise demonstrate  normal morphology and attenuation. No evidence of acute ischemia,  hemorrhage, mass, mass effect or hydrocephalus. The visualized  calvarium, tympanic cavities, and mastoid cavities are unremarkable.  Air-fluid level within the left maxillary sinus. Small left parietal  scalp contusion.      Impression    IMPRESSION: No acute intracranial abnormality.    PRIETO HAWLEY MD   XR Tibia & Fibula Left 2 Views    Narrative    XR TIBIA & FIBULA LT 2 VW 12/19/2020 3:06 PM     HISTORY: tibia pain  COMPARISON: None.       Impression    IMPRESSION: Comminuted fracture of the mid proximal shaft of the tibia  extending into the metaphysis. There is mild cortical displacement of  the fracture along the mid tibial shaft. Probable minimally impacted  fracture of the fibular head-neck. Diffuse bony demineralization.    HALLEY VAZQUEZ MD   XR Surgery INDIGO L/T 5 Min Fluoro w Stills    Narrative    SURGERY C-ARM FLUOROSCOPY LESS THAN FIVE MINUTES WITH STILLS   12/21/2020 9:19 AM     HISTORY: Left tibial rodding.    FLUOROSCOPY TIME: 1.4 minutes  NUMBER OF IMAGES ACQUIRED: 6  VIEWS: 2      Impression    IMPRESSION: Intramedullary shell placement with an underlying tibial  diaphyseal  fracture.    MARISELA ROGER MD

## 2020-12-21 NOTE — ANESTHESIA PROCEDURE NOTES
Airway   Date/Time: 12/21/2020 7:39 AM   Patient location during procedure: OR    Staff -   Anesthesiologist:  Berry Flores MD  CRNA: Teresa Lundy APRN CRNA  Performed By: CRNA    Consent for Airway   Urgency: elective    Indications and Patient Condition  Indications for airway management: demetrius-procedural  Induction type:intravenousMask difficulty assessment: 1 - vent by mask    Final Airway Details  Final airway type: supraglottic airway    Endotracheal Airway Details   Secured with: plastic tape    Post intubation assessment   Placement verified by: capnometry, equal breath sounds and chest rise   Number of attempts at approach: 1  Number of other approaches attempted: 0  Secured with:plastic tape  Ease of procedure: easy  Dentition: Intact and Unchanged

## 2020-12-21 NOTE — PROGRESS NOTES
Fit patient with hinged knee brace locked in full extension.  Verbal and written instructions given as well as contact information.  Satnam AMES.

## 2020-12-21 NOTE — PROVIDER NOTIFICATION
Paged Provider at [phone]:  PT complaining of burning with voiding. Should we collect UA? Thanks!    Paged at [phone]:  UA results are back, PT heading down for surgery.  
1.84

## 2020-12-21 NOTE — OP NOTE
[facility]   Operative Note    Pre-operative diagnosis:  Left diaphyseal tibia fracture and right comminuted patella fracture   Post-operative diagnosis  same   Procedure:  #1 open reduction intramedullary nailing of left tibia fracture  #2 left partial patella fracture excision with advancement of the quadriceps tendon   Surgeon(s): Surgeon(s) and Role:     * Stephen Rhodes MD - Primary     * Azul Maguire PA-C - Assisting -the PA was present for the entirety of the case and was essential for patient positioning, soft tissue retraction, wound closure, bandage placement, and patient transport.   Estimated blood loss: 800 mL    Specimens: * No specimens in log *   Findings:  Comminuted patella fracture and diaphyseal tibia fracture- left     Indications:  This is a 70-year-old female who sustained a ground-level fall and was seen in our emergency department.  She was found to have a left diaphyseal tibia fracture that was closed.  She also had a closed left patella fracture.  She was admitted to the hospitalist service and was optimized for surgical intervention.  She is on Xarelto at baseline and her last dose was on Saturday morning therefore we delayed surgery until we felt it was appropriate.  We discussed risk benefits and alternatives and she elected to undergo the above-mentioned procedure.    Description of procedure:   The patient was met in the preoperative area and the operative site which was the left leg was signed by myself.  Preoperative antibiotics were given.  The patient was brought back to the operating room and placed in supine position on the operating table.  All bony prominences were padded at this time.  She then underwent general anesthesia.  She was then prepped and draped in normal sterile fashion with a thigh tourniquet placed.  A timeout was then called ensuring we are operating on the correct site and correct patient.  All staff concerns were addressed at  this time.    An incision was made over the anterior aspect of the patella.  Dissection was carried down through the subcutaneous tissue and into the fracture site.  We thoroughly irrigated out the wound and there was significant hematoma present.  We reduce the tibia fracture percutaneously.  We then placed a guidewire into the anterior aspect of the shoulder of the tibia.  We confirmed our position with both AP and lateral x-rays.  We then reamed over the top and placed a ball-tipped guidewire to the center center position distally in the ankle.  We then reamed all the way to 11.5 mm until we had good chatter.  We then measured the nail to be a Synthes 345 mm nail.  We then passed the nail down the tibial canal.  The fracture was well reduced.  We then placed 2 proximal interlock screws and 2 distal interlock screws using perfect [place] technique.  Following insertion of the nail anesthesia noted that the patient had increased heart rate and was not doing well.  A stat hemoglobin was drawn and she received 1 unit of blood intraoperatively.  There was significant bleeding likely due to her Xarelto.  We also had a venous tourniquet which was eventually taken down and improved her bleeding.  Once she was able to get some blood she stabilized.    We then turned our attention towards the patellar fracture.  The proximal segment of the patella fracture was significantly comminuted and the fracture fragments were very small.  At this point we determined that these fragments should be excised rather than fixed.  We took 2 Arthrex 4.75 mm anchors and placed them into the distal larger patellar fragment.  We then used a [place] style stitch both up and down the quad tendon and secured it to the distal patella segment.  We then oversewed with an 0 Vicryl.  We then thoroughly irrigated out the knee and closed in a layered fashion using 2-0 Vicryl and 2-0 nylon suture.  The remaining skin incisions throughout the leg were  closed using 2-0 Vicryl and 3-0 nylon sutures.  The patient was placed in sterile bandages and was transferred off of the operating room table and brought to the postanesthesia care unit where she awoke without any difficulty.    All counts were correct at the end of the case.    Postoperative plan patient will be 50% weightbearing on the left lower extremity.  She will be in a knee immobilizer at all times.  We did order a hinged knee brace that is locked in extension until she sees me for follow-up.  We will gradually increase her flexion in the postoperative period.  Because she had significant bleeding during the surgery we are holding her Xarelto for a few days following surgery.

## 2020-12-21 NOTE — PLAN OF CARE
PM Shift Obs Note  Patient vital signs are at baseline: Yes  Patient able to ambulate as they were prior to admission or with assist devices provided by therapies during their stay:  No,  Reason:  Scheduled for surgery in the morning  Patient MUST void prior to discharge:  Yes  Patient able to tolerate oral intake:  Yes  Pain has adequate pain control using Oral analgesics:  Yes    Patient placed on observation this afternoon. A/Ox4 and VSS. CMS intact. Denied N/V. Gave tylenol for c/o HA. Pure wick in place, able to void adequately. ACE wrap/splint CDI. NS infusing at 75mL/hr. Gave chlorhexidine bath. NPO after midnight. Surgery scheduled for tomorrow (12/21) at 0730.  would like to be contacted tomorrow morning by surgeon prior to surgery.

## 2020-12-21 NOTE — PLAN OF CARE
Patient vital signs are at baseline: VSS afebrile 2L NC.  Patient able to ambulate as they were prior to admission or with assist devices provided by therapies during their stay:  NO. Patient has not been OOB yet. Hinged orthotics brace applied.   Patient MUST void prior to discharge:  Benitez catheter in place and patent.   Patient able to tolerate oral intake:  Pt has been nauseated. She is having ice chips and water. Scope patch on. PACU RN gave compazine earlier.  Pain has adequate pain control using Oral analgesics:  pt stated pain was a 4 out of 10 when moved leg for brace application. NO pain medication given yet due to nausea.    LS diminished. Hgb recheck 9.6 at 1230.  ML 2 units PRBC given in OR/PACU. Pt is very lethargic. Capno WNL. Benitez had 225ML of cloudy urine. Dressing c/d/I cms intact.

## 2020-12-21 NOTE — ANESTHESIA PREPROCEDURE EVALUATION
Anesthesia Pre-Procedure Evaluation    Patient: Angeli Jacobson   MRN: [id number] : 1950          Preoperative Diagnosis: Tibia fracture [S82.209A]    Procedure(s):  OPEN REDUCTION INTERNAL FIXATION, FRACTURE, TIBIA, USING INTRAMEDULLARY DARA LEFT  Open reductiojn internal fixation left patella fracture  OPEN REDUCTION INTERNAL FIXATION, FRACTURE, PATELLA    Past Medical History:   Diagnosis Date     * * * SBE PROPHYLAXIS * * *     Amox 500mg, take 4 tabs one hour prior to procedure.Takes this because of lymphedema secondary from leg surgey     Central serous retinopathy     Resolved 2001     CHRONIC NECK PAIN      Depressive disorder      Depressive disorder, not elsewhere classified      History of blood transfusion 2019    during left hip pinning     MIXED HYPERLIPIDEMIA, LDL GOAL <160     LDL goal < 160     Motion sickness      MYXOID LIPOSARCOMA 2000    Left thigh, S/P excision, radiation  at 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    CHRONIC LEFT THIGH LYMPHEDEMA     Nontoxic multinodular goiter 2005    needs yearly US     Osteoporosis, unspecified      Other lymphedema     left thigh, gets regular PT for this     PAD (peripheral artery disease) ([facility]) 2018     PONV (postoperative nausea and vomiting)      SHINGLES      Sprain of lumbosacral (joint) (ligament)     right     Unspecified hearing loss     chronic tinnitus     Unspecified tinnitus      Past Surgical History:   Procedure Laterality Date     ARTHROPLASTY HIP Left 10/4/2019    Procedure: Removal of left femoral hardware with a conversion to left total hip arthroplasty using a Biomet Annalisa femoral stem with an OsseoTi acetabular shell and a dual mobility bearing surface;  Surgeon: Spencer Celeste MD;  Location: [facility] OR     BYPASS GRAFT FEMOROPOPLITEAL Left 2019    Procedure: LEFT FEMORAL TO ABOVE KNEE POPLITEAL  BYPASS WITH POLYTETRAFLUOROETHYLENE GRAFT;  Surgeon: Roman  Shade Rudolph MD;  Location: [facility] OR     C APPENDECTOMY      Appendectomy     COLONOSCOPY N/A 2/5/2016    Procedure: COMBINED COLONOSCOPY, SINGLE OR MULTIPLE BIOPSY/POLYPECTOMY BY BIOPSY;  Surgeon: Varun Stanley MD, MD;  Location:  GI     CYSTOSCOPY       EXCISION MALIG LESION>1.25CM  5/2000    Myxoid Liposarcoma       EXPLORE GROIN Right 5/1/2018    Procedure: EXPLORE GROIN;  EMERGENCY RIGHT FEMORAL EXPLORATION WITH FEMORAL ARTERY REPAIR.    EBL: 50mL;  Surgeon: Shade Owens MD;  Location: [facility] OR     HC COLONOSCOPY THRU STOMA, DIAGNOSTIC  2006    due 2010     HC COLP CERVIX/UPPER VAGINA  07/1997    Negative     HC DILATION/CURETTAGE DIAG/THER NON OB  02/1997    Post menopausal bleeding on HRT, negative     HIP SURGERY Left 04/13/2019     IR ANGIOGRAM THROUGH CATHETER FOLLOW UP  12/20/2018     IR ANGIOGRAM THROUGH CATHETER FOLLOW UP  12/21/2018     IR LOWER EXTREMITY ANGIOGRAM LEFT  12/19/2018     VASCULAR SURGERY  04/30/2018    Left SFA stent in bypass graft     ZZC NONSPECIFIC PROCEDURE  04/2000    Open Biopsy Left Thigh Liposarcoma     Anesthesia Evaluation     . Pt has had prior anesthetic.     History of anesthetic complications   - PONV        ROS/MED HX    ENT/Pulmonary:       Neurologic:       Cardiovascular:     (+) Dyslipidemia, -Peripheral Vascular Disease---. Taking blood thinners : . . . :. .       METS/Exercise Tolerance:     Hematologic:         Musculoskeletal:         GI/Hepatic:        (-) GERD   Renal/Genitourinary:         Endo:     (+) thyroid problem .      Psychiatric:         Infectious Disease:         Malignancy:         Other:                          Physical Exam      Airway   Mallampati: II  TM distance: >3 FB  Neck ROM: full    Dental     Cardiovascular   Rhythm and rate: regular and normal      Pulmonary    breath sounds clear to auscultation            Lab Results   Component Value Date    WBC 7.4 12/20/2020    HGB 10.0 (L) 12/20/2020    HCT 31.0 (L) 12/20/2020      "12/20/2020    CRP 3.0 06/06/2006    SED 62 (H) 05/16/2006     12/20/2020    POTASSIUM 4.1 12/20/2020    CHLORIDE 107 12/20/2020    CO2 27 12/20/2020    BUN 14 12/20/2020    CR 0.60 12/20/2020     (H) 12/20/2020    LONG 8.4 (L) 12/20/2020    MAG 1.8 05/01/2018    ALBUMIN 2.8 (L) 09/09/2020    PROTTOTAL 8.6 09/09/2020    ALT 27 09/09/2020    AST 20 09/09/2020    ALKPHOS 104 09/09/2020    BILITOTAL 0.2 09/09/2020    PTT 27 12/19/2018    INR 1.01 09/27/2019    TSH 3.37 06/09/2020    T4 0.72 (L) 09/17/2019       Preop Vitals  BP Readings from Last 3 Encounters:   12/21/20 120/65   10/27/20 122/78   09/22/20 108/70    Pulse Readings from Last 3 Encounters:   12/21/20 84   09/22/20 77   09/09/20 86      Resp Readings from Last 3 Encounters:   12/21/20 16   09/22/20 16   09/09/20 16    SpO2 Readings from Last 3 Encounters:   12/21/20 94%   09/22/20 98%   09/09/20 98%      Temp Readings from Last 1 Encounters:   12/21/20 97.9  F (36.6  C) (Temporal)    Ht Readings from Last 1 Encounters:   09/22/20 1.676 m (5' 6\")      Wt Readings from Last 1 Encounters:   12/21/20 68.5 kg (151 lb)    Estimated body mass index is 24.37 kg/m  as calculated from the following:    Height as of 9/22/20: 1.676 m (5' 6\").    Weight as of this encounter: 68.5 kg (151 lb).       Anesthesia Plan      History & Physical Review  History and physical reviewed and following examination; no interval change.    ASA Status:  3 .    NPO Status:  > 8 hours    Plan for General with Intravenous and Propofol induction. Maintenance will be Balanced.    PONV prophylaxis:  Ondansetron (or other 5HT-3) and Dexamethasone or Solumedrol         Postoperative Care  Postoperative pain management:  IV analgesics, Oral pain medications and Multi-modal analgesia.      Consents  Anesthetic plan, risks, benefits and alternatives discussed with:  Patient..                 Berry Flores MD                    .  "

## 2020-12-21 NOTE — PLAN OF CARE
Patient vital signs are at baseline: Yes  Patient able to ambulate as they were prior to admission or with assist devices provided by therapies during their stay:  No,  Reason:  bedrest  Patient MUST void prior to discharge:  Yes  Patient able to tolerate oral intake:  No,  Reason:  NPO for surgery in am  Pain has adequate pain control using Oral analgesics:  Yes

## 2020-12-22 ENCOUNTER — APPOINTMENT (OUTPATIENT)
Dept: OCCUPATIONAL THERAPY | Facility: CLINIC | Age: 70
End: 2020-12-22
Attending: PHYSICIAN ASSISTANT
Payer: COMMERCIAL

## 2020-12-22 ENCOUNTER — APPOINTMENT (OUTPATIENT)
Dept: PHYSICAL THERAPY | Facility: CLINIC | Age: 70
End: 2020-12-22
Attending: PHYSICIAN ASSISTANT
Payer: COMMERCIAL

## 2020-12-22 LAB
GLUCOSE SERPL-MCNC: 115 MG/DL (ref 70–99)
HGB BLD-MCNC: 8.6 G/DL (ref 11.7–15.7)

## 2020-12-22 PROCEDURE — 97161 PT EVAL LOW COMPLEX 20 MIN: CPT | Mod: GP | Performed by: PHYSICAL THERAPIST

## 2020-12-22 PROCEDURE — 99225 PR SUBSEQUENT OBSERVATION CARE,LEVEL II: CPT | Performed by: INTERNAL MEDICINE

## 2020-12-22 PROCEDURE — 85018 HEMOGLOBIN: CPT | Performed by: PHYSICIAN ASSISTANT

## 2020-12-22 PROCEDURE — 97116 GAIT TRAINING THERAPY: CPT | Mod: GP | Performed by: PHYSICAL THERAPIST

## 2020-12-22 PROCEDURE — 97535 SELF CARE MNGMENT TRAINING: CPT | Mod: GO,59

## 2020-12-22 PROCEDURE — 97110 THERAPEUTIC EXERCISES: CPT | Mod: GP | Performed by: PHYSICAL THERAPIST

## 2020-12-22 PROCEDURE — 250N000011 HC RX IP 250 OP 636: Performed by: PHYSICIAN ASSISTANT

## 2020-12-22 PROCEDURE — 96376 TX/PRO/DX INJ SAME DRUG ADON: CPT

## 2020-12-22 PROCEDURE — 97530 THERAPEUTIC ACTIVITIES: CPT | Mod: GP | Performed by: PHYSICAL THERAPIST

## 2020-12-22 PROCEDURE — 250N000013 HC RX MED GY IP 250 OP 250 PS 637: Performed by: PHYSICIAN ASSISTANT

## 2020-12-22 PROCEDURE — 36415 COLL VENOUS BLD VENIPUNCTURE: CPT | Performed by: PHYSICIAN ASSISTANT

## 2020-12-22 PROCEDURE — 250N000011 HC RX IP 250 OP 636: Performed by: INTERNAL MEDICINE

## 2020-12-22 PROCEDURE — G0378 HOSPITAL OBSERVATION PER HR: HCPCS

## 2020-12-22 PROCEDURE — 97165 OT EVAL LOW COMPLEX 30 MIN: CPT | Mod: GO

## 2020-12-22 PROCEDURE — 99207 PR CDG-CODE CATEGORY CHANGED: CPT | Performed by: INTERNAL MEDICINE

## 2020-12-22 PROCEDURE — 82947 ASSAY GLUCOSE BLOOD QUANT: CPT | Performed by: PHYSICIAN ASSISTANT

## 2020-12-22 RX ORDER — ACETAMINOPHEN 325 MG/1
650 TABLET ORAL EVERY 6 HOURS PRN
Status: ON HOLD | COMMUNITY
Start: 2020-12-22 | End: 2021-07-28

## 2020-12-22 RX ORDER — OXYCODONE HYDROCHLORIDE 5 MG/1
5 TABLET ORAL EVERY 4 HOURS PRN
Qty: 40 TABLET | Refills: 0 | Status: SHIPPED | OUTPATIENT
Start: 2020-12-22 | End: 2021-05-11

## 2020-12-22 RX ORDER — POLYETHYLENE GLYCOL 3350 17 G/17G
17 POWDER, FOR SOLUTION ORAL DAILY
Qty: 510 G | Refills: 0 | Status: SHIPPED | OUTPATIENT
Start: 2020-12-22 | End: 2021-02-05

## 2020-12-22 RX ORDER — VITAMIN B COMPLEX
50 TABLET ORAL DAILY
Qty: 60 TABLET | Refills: 0 | Status: SHIPPED | OUTPATIENT
Start: 2020-12-23 | End: 2021-02-11

## 2020-12-22 RX ORDER — ALENDRONATE SODIUM 70 MG/1
TABLET ORAL
Qty: 12 TABLET | Refills: 3 | Status: SHIPPED | OUTPATIENT
Start: 2021-02-01 | End: 2021-08-18

## 2020-12-22 RX ADMIN — MIRABEGRON 25 MG: 25 TABLET, FILM COATED, EXTENDED RELEASE ORAL at 19:52

## 2020-12-22 RX ADMIN — CEFTRIAXONE 1 G: 1 INJECTION, POWDER, FOR SOLUTION INTRAMUSCULAR; INTRAVENOUS at 18:15

## 2020-12-22 RX ADMIN — TOLTERODINE TARTRATE 2 MG: 2 CAPSULE, EXTENDED RELEASE ORAL at 19:52

## 2020-12-22 RX ADMIN — OXYCODONE HYDROCHLORIDE 5 MG: 5 TABLET ORAL at 22:27

## 2020-12-22 RX ADMIN — ACETAMINOPHEN 975 MG: 325 TABLET, FILM COATED ORAL at 08:10

## 2020-12-22 RX ADMIN — LEVOTHYROXINE SODIUM 50 MCG: 50 TABLET ORAL at 08:09

## 2020-12-22 RX ADMIN — THERA TABS 1 TABLET: TAB at 08:09

## 2020-12-22 RX ADMIN — DOCUSATE SODIUM 100 MG: 100 CAPSULE, LIQUID FILLED ORAL at 08:10

## 2020-12-22 RX ADMIN — DOCUSATE SODIUM AND SENNOSIDES 1 TABLET: 8.6; 5 TABLET ORAL at 08:09

## 2020-12-22 RX ADMIN — Medication 50 MCG: at 08:08

## 2020-12-22 RX ADMIN — DOCUSATE SODIUM 100 MG: 100 CAPSULE, LIQUID FILLED ORAL at 19:51

## 2020-12-22 RX ADMIN — ACETAMINOPHEN 975 MG: 325 TABLET, FILM COATED ORAL at 15:57

## 2020-12-22 RX ADMIN — ACETAMINOPHEN 975 MG: 325 TABLET, FILM COATED ORAL at 00:31

## 2020-12-22 RX ADMIN — OXYCODONE HYDROCHLORIDE 5 MG: 5 TABLET ORAL at 14:01

## 2020-12-22 RX ADMIN — POLYETHYLENE GLYCOL 3350 17 G: 17 POWDER, FOR SOLUTION ORAL at 08:10

## 2020-12-22 RX ADMIN — DOCUSATE SODIUM AND SENNOSIDES 1 TABLET: 8.6; 5 TABLET ORAL at 19:52

## 2020-12-22 RX ADMIN — CEFAZOLIN SODIUM 1 G: 1 INJECTION, SOLUTION INTRAVENOUS at 01:27

## 2020-12-22 RX ADMIN — OXYCODONE HYDROCHLORIDE 5 MG: 5 TABLET ORAL at 08:24

## 2020-12-22 RX ADMIN — EZETIMIBE 10 MG: 10 TABLET ORAL at 08:09

## 2020-12-22 RX ADMIN — ROSUVASTATIN CALCIUM 20 MG: 5 TABLET, FILM COATED ORAL at 19:52

## 2020-12-22 ASSESSMENT — ACTIVITIES OF DAILY LIVING (ADL): PREVIOUS_RESPONSIBILITIES: MEAL PREP;HOUSEKEEPING;LAUNDRY;SHOPPING;MEDICATION MANAGEMENT;FINANCES;DRIVING

## 2020-12-22 NOTE — PROGRESS NOTES
12/22/20 0945   Quick Adds   Type of Visit Initial PT Evaluation   Living Environment   People in home spouse   Current Living Arrangements house   Home Accessibility stairs to enter home;stairs within home   Number of Stairs, Main Entrance 2  (porch/threshold)   Stair Railings, Main Entrance none   Number of Stairs, Within Home, Primary other (see comments)  (12)   Stair Railings, Within Home, Primary railing on right side (ascending)   Transportation Anticipated family or friend will provide   Living Environment Comments bathroom off bedroom has high tub, other bath with typcial tub   Self-Care   Usual Activity Tolerance good   Current Activity Tolerance moderate   Equipment Currently Used at Home none   Activity/Exercise/Self-Care Comment pt has WW, SEC, cruthches athome   Disability/Function   Hearing Difficulty or Deaf yes   Patient's preferred means of communication other  (speaking louder works)   Describe hearing loss bilateral hearing loss   Use of hearing assistive devices bilateral hearing aids   Were auxiliary aids offered? yes   The following aids were provided; patient declined offer of auxiliary aids   Wear Glasses or Blind yes   Concentrating, Remembering or Making Decisions Difficulty no   Difficulty Communicating no   Difficulty Eating/Swallowing no   Walking or Climbing Stairs Difficulty no   Dressing/Bathing Difficulty no   Toileting issues no   Doing Errands Independently Difficulty (such as shopping) no   Fall history within last six months yes   Number of times patient has fallen within last six months 1   General Information   Onset of Illness/Injury or Date of Surgery 12/21/20   Referring Physician Ohearn   Patient/Family Therapy Goals Statement (PT) return home   Pertinent History of Current Problem (include personal factors and/or comorbidities that impact the POC) s/p L tibia plateau, patellar fracture sustained in fall on ice   Existing Precautions/Restrictions fall   Weight-Bearing  Status - LLE partial weight-bearing (% in comments)  (50)   Cognition   Orientation Status (Cognition) oriented x 4   Affect/Mental Status (Cognition) anxious   Follows Commands (Cognition) WNL   Pain Assessment   Patient Currently in Pain Yes, see Vital Sign flowsheet   Posture    Posture Forward head position   Range of Motion (ROM)   ROM Comment L knee immobilized in extension, remaining WFL   Strength   Strength Comments LE strength WFL   Bed Mobility   Comment (Bed Mobility) min A   Transfers   Transfer Safety Comments CGA w/ WW, L PWB   Gait/Stairs (Locomotion)   Comment (Gait/Stairs) CGA w/ WW, L PWB   Balance   Balance Comments impaired in standing with WB restrictions   Clinical Impression   Criteria for Skilled Therapeutic Intervention yes, treatment indicated   PT Diagnosis (PT) Difficulty walking   Influenced by the following impairments impaired gait, dec indep w/ transfers, pain   Functional limitations due to impairments impaired functional mobility   Clinical Presentation Stable/Uncomplicated   Clinical Presentation Rationale s/p tibial plateau, patellar fx   Clinical Decision Making (Complexity) low complexity   Therapy Frequency (PT) 2x/day   Predicted Duration of Therapy Intervention (days/wks) 2 days   Planned Therapy Interventions (PT) bed mobility training;gait training;transfer training   Risk & Benefits of therapy have been explained evaluation/treatment results reviewed;care plan/treatment goals reviewed;patient   PT Discharge Planning    PT Discharge Recommendation (DC Rec) home with assist   PT Rationale for DC Rec Pt ambulating and transferring with CGA/SBA x 1, completed stair instruction, anticipate pt will need SBA to navigate stairs at home and assist for bed mobility, spouse able to assist as needed.    PT Brief overview of current status  Min A bed mobility, CGA for gait and transfers, progressing to sBA   Therapy Certification   Start of care date 12/21/20   Certification date from  12/22/20   Certification date to 12/24/20   Medical Diagnosis L Tibia fracture   Total Evaluation Time   Total Evaluation Time (Minutes) 10

## 2020-12-22 NOTE — PLAN OF CARE
Patient vital signs are at baseline: Yes, O2 RA  Patient able to ambulate as they were prior to admission or with assist devices provided by therapies during their stay:  No,  Reason:  Ax2 GB/W  Patient MUST void prior to discharge:  No,  Reason:  DTV, Benitez removed 0635  Patient able to tolerate oral intake:  Yes  Pain has adequate pain control using Oral analgesics:  Yes, scheduled APAP    LS diminished, BS active, +2 edema to L foot. Denies N/T, denies N/V. IV ancef and rocephin. Hgb 8.6. Will continue to monitor.

## 2020-12-22 NOTE — PROGRESS NOTES
12/22/20 1536   Quick Adds   Type of Visit Initial Occupational Therapy Evaluation   Living Environment   People in home spouse   Current Living Arrangements house   Home Accessibility stairs to enter home;stairs within home   Number of Stairs, Main Entrance 2  (porch/threshold)   Stair Railings, Main Entrance none   Number of Stairs, Within Home, Primary other (see comments)  (12)   Stair Railings, Within Home, Primary railing on right side (ascending)   Transportation Anticipated car, drives self;family or friend will provide   Living Environment Comments Pt lives with spouse in house, 2 KEENAN, full flight to upstairs bedroom, tub/shower, standard height toilet.    Self-Care   Usual Activity Tolerance good   Current Activity Tolerance moderate   Equipment Currently Used at Home none   Activity/Exercise/Self-Care Comment Pt has WW, crutches, cane, RTS, shower chair   Disability/Function   Hearing Difficulty or Deaf yes   Wear Glasses or Blind yes   Vision Management glasses   Concentrating, Remembering or Making Decisions Difficulty no   Difficulty Communicating no   Difficulty Eating/Swallowing no   Walking or Climbing Stairs Difficulty no   Dressing/Bathing Difficulty no   Toileting issues no   Doing Errands Independently Difficulty (such as shopping) no   Fall history within last six months yes   Number of times patient has fallen within last six months 1   Change in Functional Status Since Onset of Current Illness/Injury yes   General Information   Onset of Illness/Injury or Date of Surgery 12/19/20   Referring Physician Azul Maguire, PA-C   Patient/Family Therapy Goal Statement (OT) Pt's goal is to d/c home   Additional Occupational Profile Info/Pertinent History of Current Problem Per chart: Pt is a 70 year old female S/p left tibia and patellar fracture ORIF   Performance Patterns (Routines, Roles, Habits) Pt reports indep in all ADLs, IADLs and mobility tasks with no AD at baseline.    Existing  Precautions/Restrictions fall;other (see comments)  (brace at all times)   Left Lower Extremity (Weight-bearing Status) partial weight-bearing (PWB)  (50%)   Visual Perception   Visual Impairment/Limitations corrective lenses full-time   Pain Assessment   Patient Currently in Pain Yes, see Vital Sign flowsheet  (2/10 pain in LLE)   Bed Mobility   Supine-Sit [place] (Bed Mobility) minimum assist (75% patient effort)   Sit-Supine [place] (Bed Mobility) minimum assist (75% patient effort)   Transfers   Transfers sit-stand transfer;toilet transfer   Sit-Stand Transfer   Sit-Stand [place] (Transfers) contact guard   Assistive Device (Sit-Stand Transfers) walker, front-wheeled   Toilet Transfer   [place] Level (Toilet Transfer) contact guard   Assistive Device (Toilet Transfer) walker, front-wheeled   Balance   Balance Comments CGA while in stance FWW level, good maintenance of PWB LLE   Activities of Daily Living   BADL Assessment/Intervention lower body dressing   Lower Body Dressing Assessment/Training   [place] Level (Lower Body Dressing) minimum assist (75% patient effort)   Instrumental Activities of Daily Living (IADL)   Previous Responsibilities meal prep;housekeeping;laundry;shopping;medication management;finances;driving   IADL Comments Has support of spouse for IADLs as needed   Clinical Impression   Criteria for Skilled Therapeutic Interventions Met (OT) yes;meets criteria;skilled treatment is necessary   OT Diagnosis Impaired ADLs, IADLs and mobility tasks   OT Problem List-Impairments impacting ADL problems related to;activity tolerance impaired;pain;post-surgical precautions   ADL comments/analysis Pt presents to OT below baseline level of functioning with regards to ADLs   Assessment of Occupational Performance 5 or more Performance Deficits   Identified Performance Deficits Bathing, dressing, grooming, toileting, homemaking, transfers   Planned Therapy Interventions (OT) ADL  retraining;IADL retraining;strengthening;transfer training   Clinical Decision Making Complexity (OT) low complexity   Therapy Frequency (OT) 1x eval and treat   Risks and Benefits of Treatment have been explained. Yes   Patient, Family & other staff in agreement with plan of care Yes   OT Discharge Planning    OT Discharge Recommendation (DC Rec) Home with assist   OT Rationale for DC Rec Pt doing well, has all DME/AE recommended. Will have support from spouse for ADLs/IADLs as needed   Total Evaluation Time (Minutes)   Total Evaluation Time (Minutes) 8

## 2020-12-22 NOTE — PLAN OF CARE
Occupational Therapy Discharge Summary    Reason for therapy discharge:    All goals and outcomes met, no further needs identified.    Progress towards therapy goal(s). See goals on Care Plan in Epic electronic health record for goal details.  Goals met    Therapy recommendation(s):    No further skilled OT needs identified. Has all recommended DME/AE and support from spouse for ADLs/IADLs.

## 2020-12-22 NOTE — PROGRESS NOTES
Care Management Discharge Note    Discharge Date: 12/22/20(/Home)       Discharge Disposition:  home    Discharge Services:  Home care PT/OT ordered    Discharge DME:      Discharge Transportation: family or friend will provide    Private pay costs discussed: Not applicable      Additional Information:  Met with pt as pt has home care orders for PT/OT. Pt states she has been through this surgery twice now and is not interested in home care. No referral has been sent.    Padmini Redd RN, BSN CTS  Care Coordinator  [facility]   [phone]

## 2020-12-22 NOTE — PROGRESS NOTES
Orthopedic Surgery  Angeli Jacobson  2020  Admit Date:  2020  POD 1 Day Post-Op  S/P Procedure(s):  Open reduction intramedullary nailing of left tibia fracture  Left partial patella fracture excision with advancement of the quadriceps tendon    Patient resting comfortably in bed.    Pain controlled.  Tolerating oral intake.    Denies nausea or vomiting  Denies chest pain or shortness of breath  No events overnight.     Alert and orient to person, place, and time.  Vital Sign Ranges  Temperature Temp  Av.6  F (36.4  C)  Min: 97.2  F (36.2  C)  Max: 98.1  F (36.7  C)   Blood pressure Systolic (24hrs), Av , Min:103 , Max:128        Diastolic (24hrs), Av, Min:51, Max:68      Pulse Pulse  Av.4  Min: 65  Max: 90   Respirations Resp  Av.7  Min: 10  Max: 21   Pulse oximetry SpO2  Av.8 %  Min: 94 %  Max: 99 %       ACE is clean, dry, and intact.   HKB worn and locked in extension  Minimal erythema of the surrounding skin.   Bilateral calves are soft, non-tender.  Bilateral lower extremity is NVI.  Sensation intact bilateral lower extremities  5/5 motor with resisted dorsi and plantar flexion bilaterally  +Dp pulse    Labs:  Recent Labs   Lab Test 20  0744 20  1513 20  0927   POTASSIUM 4.1 4.1 4.3     Recent Labs   Lab Test 20  0626 20  1230 20  0857   HGB 8.6* 9.6* 7.9*     Recent Labs   Lab Test 19  0929 18  1241 18  1700   INR 1.01 0.97 1.43*     Recent Labs   Lab Test 20  0744 20  1513 20  0927    283 573*       A/P  1. S/p left tibia and patellar fracture ORIF   Continue ASA and Plavix for DVT prophylaxis starting tomorrow.     Mobilize with PT/OT    50% WB left LE.   Leave dressing and HKB intact and knee locked in extension     Continue current pain regiment.   Hgb recheck tomorrow AM    2. Disposition   Anticipate d/c to home based on pain control and PT progress as well as medical  clearance.    Irena Gilliam PA-C

## 2020-12-22 NOTE — PLAN OF CARE
Patient vital signs are at baseline: Yes VSS afebrile RA.   Patient able to ambulate as they were prior to admission or with assist devices provided by therapies during their stay:  Yes up to chair with assist of 1 gait belt, walker, hinged brace and PWB 50%.   Patient MUST void prior to discharge:  Pt DTV. patient voided twice. It was not measured. Bladder Scan at 1411 was 283 ml. Pt said she has overactive bladder and weakened pelvis muscles. She thinks she usually does empty entirely.  Patient able to tolerate oral intake:  Yes. Tolerated regular diet. Denies nausea.  Pain has adequate pain control using Oral analgesics:  Yes. Pain managed with scheduled Tylenol and prn Oxycodone. Pain 2 out of 10 with movement. PT said pt was able to discharge home today but will need afternoon session first.     LS clear. RA. +bs/flatus. No nausea. Tolerated regular diet. Cms intact. Dressing c/d/i.     Ortho MD decided patient will stay another night and see how her hgb is and decide on anticoagulants that will be started. Will continue to monitor.

## 2020-12-22 NOTE — PLAN OF CARE
PM Shift Obs Note  Patient vital signs are at baseline: Yes  Patient able to ambulate as they were prior to admission or with assist devices provided by therapies during their stay:  No,  Reason:  Still recovering from surgery.  Patient MUST void prior to discharge:  No,  Reason:  Benitez still in place  Patient able to tolerate oral intake:  Yes  Pain has adequate pain control using Oral analgesics:  Yes     Pt A/Ox4. VSS on room air and capno. LS diminished. Dressing CDI. +1 edema in left foot. Orthotics fitted her to a leg brace this afternoon. Benitez patent and secure. Pain controlled with tylenol. UA ordered and reviewed by physician. Rocephin given d/t elevated WBC count. Assist x2 with partial weight bearing on L leg (50%). Pt dangled and stood, no SOB or dizziness. Plan for discharge is TBD.

## 2020-12-22 NOTE — PLAN OF CARE
Patient vital signs are at baseline: Yes. VSS afebrile RA  Patient able to ambulate as they were prior to admission or with assist devices provided by therapies during their stay:  Yes. Up with assist of one gait belt and walker. Partial 50% WB with hinged brace on.   Patient MUST void prior to discharge:  No. DTV. Benitez discontinued previous shift 0630.  Patient able to tolerate oral intake:  Yes. Tolerated regular diet and pt stated she has not nausea  Pain has adequate pain control using Oral analgesics:  Yes. Pain managed with prn Oxycodone and scheduled Tylenol. 2 out of ten when she has not movement.

## 2020-12-22 NOTE — PLAN OF CARE
PT PM session: Patient declines stair review this PM. Reports she is staying until tomorrow; requests PT to check back.

## 2020-12-22 NOTE — DISCHARGE SUMMARY
[facility]  Discharge Summary  Name: Angeli Jacobson    MRN: [id number]  YOB: 1950    Age: 70 year old  Date of Discharge:  12/22/2020  Date of Admission: 12/19/2020  Primary Care Provider: Sepideh Burris  Discharge Physician:  Antonio Sharma MD  Discharging Service:  Hospitalist      Discharge Diagnosis:  1. Left midshaft tibiofibular fracture status post fall.  2. Right comminuted patellar fracture  3. Status post open reduction intramedullary nailing of left tibia fracture and left patella partial patellar fracture excision with advancement of quadriceps tendon  4. Peripheral arterial disease managed with Plavix and aspirin.  5. Hypothyroidism  6. Dyslipidemia   7. Urinary tract infection  8. Acute blood loss anemia secondary to recent fracture and surgery     Other Diagnosis:  Past Medical History:   Diagnosis Date     * * * SBE PROPHYLAXIS * * * 1998    Amox 500mg, take 4 tabs one hour prior to procedure.Takes this because of lymphedema secondary from leg surgey     Central serous retinopathy 2001    Resolved 9/2001     CHRONIC NECK PAIN 1995     Depressive disorder      Depressive disorder, not elsewhere classified 2001     History of blood transfusion 04/2019    during left hip pinning     MIXED HYPERLIPIDEMIA, LDL GOAL <160 1998    LDL goal < 160     Motion sickness      MYXOID LIPOSARCOMA 2000    Left thigh, S/P excision, radiation  at [facility]     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    CHRONIC LEFT THIGH LYMPHEDEMA     Nontoxic multinodular goiter 2005    needs yearly US     Osteoporosis, unspecified 2001     Other lymphedema 2000    left thigh, gets regular PT for this     PAD (peripheral artery disease) (H) 4/20/2018     PONV (postoperative nausea and vomiting)      SHINGLES 2001     Sprain of lumbosacral (joint) (ligament) 1995    right     Unspecified hearing loss 1998    chronic tinnitus     Unspecified tinnitus 1998          Discharge Disposition:  Discharged to home      Allergies:  Allergies   Allergen Reactions     Celexa [Citalopram Hydrobromide]      Decreased libido        Discharge Medications:   Current Discharge Medication List      START taking these medications    Details   acetaminophen (TYLENOL) 325 MG tablet Take 2 tablets (650 mg) by mouth every 6 hours as needed for mild pain    Associated Diagnoses: Closed fracture of left tibia and fibula, initial encounter; Closed displaced fracture of left patella, unspecified fracture morphology, initial encounter      amoxicillin-clavulanate (AUGMENTIN) 875-125 MG tablet Take 1 tablet by mouth 2 times daily for 3 days  Qty: 6 tablet, Refills: 0    Associated Diagnoses: Acute cystitis without hematuria      oxyCODONE (ROXICODONE) 5 MG tablet Take 1 tablet (5 mg) by mouth every 4 hours as needed for moderate to severe pain  Qty: 40 tablet, Refills: 0    Associated Diagnoses: Closed fracture of left tibia and fibula, initial encounter; Closed displaced fracture of left patella, unspecified fracture morphology, initial encounter      polyethylene glycol (MIRALAX) 17 GM/Dose powder Take 17 g by mouth daily  Qty: 510 g, Refills: 0    Associated Diagnoses: Closed fracture of left tibia and fibula, initial encounter; Closed displaced fracture of left patella, unspecified fracture morphology, initial encounter      Vitamin D3 (CHOLECALCIFEROL) 25 mcg (1000 units) tablet Take 2 tablets (50 mcg) by mouth daily  Qty: 60 tablet, Refills: 0    Associated Diagnoses: Closed fracture of left tibia and fibula, initial encounter; Closed displaced fracture of left patella, unspecified fracture morphology, initial encounter         CONTINUE these medications which have CHANGED    Details   alendronate (FOSAMAX) 70 MG tablet TAKE 1 TABLET BY MOUTH EVERY 7 DAYS W/8 OZ WATER 30 MIN BEFORE BREAKFAST/REMAIN UPRIGHT  Qty: 12 tablet, Refills: 3    Associated Diagnoses: Age-related osteoporosis without current pathological fracture         CONTINUE  these medications which have NOT CHANGED    Details   melatonin 3 MG tablet Take 3 mg by mouth nightly as needed for sleep       mirabegron (MYRBETRIQ) 25 MG 24 hr tablet Take 25 mg by mouth daily as needed      multivitamin, therapeutic (THERA-VIT) TABS tablet Take 1 tablet by mouth daily      rosuvastatin (CRESTOR) 20 MG tablet Take 1 tablet (20 mg) by mouth daily  Qty: 30 tablet, Refills: 11    Associated Diagnoses: History of arterial bypass of lower extremity; Hyperlipidemia LDL goal <70      aspirin (ASA) 81 MG chewable tablet Take 81 mg by mouth daily      calcium carbonate 600 mg-vitamin D 400 units (CALTRATE) 600-400 MG-UNIT per tablet Take 1 chew tab by mouth daily      clopidogrel (PLAVIX) 75 MG tablet Take 1 tablet (75 mg) by mouth daily  Qty: 90 tablet, Refills: 3    Comments: Refill when due      ezetimibe (ZETIA) 10 MG tablet Take 1 tablet (10 mg) by mouth daily  Qty: 90 tablet, Refills: 3    Comments: Refill when due  Associated Diagnoses: Hyperlipidemia LDL goal <70      fesoterodine fumarate (TOVIAZ) 4 MG TB24 24 hr tablet Take 1 tablet (4 mg) by mouth daily  Qty: 30 tablet, Refills: 3    Associated Diagnoses: OAB (overactive bladder)      levothyroxine (SYNTHROID/LEVOTHROID) 50 MCG tablet TAKE 1 TABLET BY MOUTH EVERY DAY  Qty: 90 tablet, Refills: 3    Comments: Appointment required for further refills.  Associated Diagnoses: Hypothyroidism, unspecified type      !! order for DME Equipment being ordered: Left Thigh High Compression Stocking, 550 CCL.3  Qty: 1 each, Refills: 99    Associated Diagnoses: Myxoid liposarcoma (H)      !! ORDER FOR DME Equipment being ordered: lymphedema bandages  Qty: 2 Device, Refills: 1    Associated Diagnoses: Personal history of malignant neoplasm of other site; Other lymphedema       !! - Potential duplicate medications found. Please discuss with provider.      STOP taking these medications       nitroFURantoin macrocrystal-monohydrate (MACROBID) 100 MG capsule  Comments:   Reason for Stopping:                Condition on Discharge:  Discharge condition: Stable   Discharge vitals: Blood pressure 128/68, pulse 84, temperature 97.3  F (36.3  C), temperature source Temporal, resp. rate 16, weight 68.5 kg (151 lb), last menstrual period 03/18/2005, SpO2 98 %, not currently breastfeeding.   Code status on discharge: Full Code     History of Present Illness:  See detailed admission note for full details.        Significant Physical Exam Findings Day of Discharge:  HEENT; Atraumatic, normocephalic, pinkish conjuctiva, pupils bilateral reactive   Skin: warm and moist, no rashes  Lungs: equal chest expansion, clear to auscultation, no wheezes, no stridor, no crackles,   Heart: normal rate, normal rhythm, no rubs or gallops.   Abdomen: normal bowel sounds, no tenderness, no peritoneal signs, no guarding  Extremities: no deformities, no edema   Neuro; follow commands, alert and oriented x3, spontaneous speech, coherent, moves all extremities spontaneously  Psych; no hallucination, euthymic mood, not agitated        Procedures other than Imaging:  As listed above     Imaging:  Results for orders placed or performed during the hospital encounter of 12/19/20   XR Knee Left 3 Views    Narrative    XR KNEE LT 3 VW   12/19/2020 3:05 PM     HISTORY: knee pain  COMPARISON: None.       Impression    IMPRESSION: Acute distracted-displaced transverse fracture through the  patella, larger inferior pole fragment is displaced inferiorly by 2 to  3 cm and is rotated.    Acute comminuted nondisplaced fracture of the proximal shaft and  metaphysis of the tibia, incompletely imaged. There is likely mildly  impacted fracture of the fibular neck. Cortical irregularity of the  subchondral medial distal femur posteriorly is more likely a  projectional artifact, less likely fracture.    Lipohemarthrosis. These bone demineralization. Old fractured screw in  the distal femoral shaft.    HALLEY VAZQUEZ MD    XR Femur Left 2 Views    Narrative    XR FEMUR LT 2 VW 12/19/2020 3:05 PM     HISTORY: femur pain  COMPARISON: Left hip x-ray 10/4/2019       Impression    IMPRESSION: Left hip arthroplasty stable. There is side plate and  cerclage wire fixation of old periprosthetic proximal left femur  fracture with likely healing of most of the fracture. Old fractured  screw in the distal femoral shaft. No acute femur fracture. There is  an acute displaced fracture of the patella. Diffuse bony  demineralization. Arterial calcification and stent-graft.    HALLEY VAZQUEZ MD   Head CT w/o contrast    Narrative    CT SCAN OF THE HEAD WITHOUT CONTRAST   12/19/2020 2:47 PM     HISTORY: Head trauma, minor, GCS>=13, low clinical risk, initial exam.    TECHNIQUE:  Axial images of the head and coronal reformations without  IV contrast material. Radiation dose for this scan was reduced using  automated exposure control, adjustment of the mA and/or kV according  to patient size, or iterative reconstruction technique.    COMPARISON: Head MRI 6/4/2019, head CT 4/13/2006.    FINDINGS: Mild volume loss is present. White matter hypoattenuation  likely represents mild chronic small vessel ischemic change. The  cerebral hemispheres, brainstem, and cerebellum otherwise demonstrate  normal morphology and attenuation. No evidence of acute ischemia,  hemorrhage, mass, mass effect or hydrocephalus. The visualized  calvarium, tympanic cavities, and mastoid cavities are unremarkable.  Air-fluid level within the left maxillary sinus. Small left parietal  scalp contusion.      Impression    IMPRESSION: No acute intracranial abnormality.    PRIETO HAWLEY MD   XR Tibia & Fibula Left 2 Views    Narrative    XR TIBIA & FIBULA LT 2 VW 12/19/2020 3:06 PM     HISTORY: tibia pain  COMPARISON: None.       Impression    IMPRESSION: Comminuted fracture of the mid proximal shaft of the tibia  extending into the metaphysis. There is mild cortical displacement  of  the fracture along the mid tibial shaft. Probable minimally impacted  fracture of the fibular head-neck. Diffuse bony demineralization.    HALLEY VAZQUEZ MD   XR Surgery INDIGO L/T 5 Min Fluoro w Stills    Narrative    SURGERY C-ARM FLUOROSCOPY LESS THAN FIVE MINUTES WITH STILLS   12/21/2020 9:19 AM     HISTORY: Left tibial rodding.    FLUOROSCOPY TIME: 1.4 minutes  NUMBER OF IMAGES ACQUIRED: 6  VIEWS: 2      Impression    IMPRESSION: Intramedullary shell placement with an underlying tibial  diaphyseal fracture.    MARISELA ROGER MD        Consultations:  Consultation during this admission received from orthopedics.     Recent Lab Results:  Recent Labs   Lab 12/22/20  0626 12/21/20  1230 12/21/20  0857 12/20/20  0744 12/19/20  1513   WBC  --   --   --  7.4 12.8*   HGB 8.6* 9.6* 7.9* 10.0* 12.2   HCT  --   --   --  31.0* 39.3   MCV  --   --   --  93 95   PLT  --   --   --  214 283     Recent Labs   Lab 12/21/20  1608 12/21/20  0450   CULT Culture negative monitoring continues >100,000 colonies/mL  Gram negative rods  Identification and susceptibility to follow  *     Recent Labs   Lab 12/22/20  0626 12/20/20  0744 12/19/20  1513   NA  --  137 140   POTASSIUM  --  4.1 4.1   CHLORIDE  --  107 107   CO2  --  27 31   ANIONGAP  --  3 2*   * 113* 133*   BUN  --  14 16   CR  --  0.60 0.54   GFRESTIMATED  --  >90 >90   GFRESTBLACK  --  >90 >90   LONG  --  8.4* 9.2     Recent Labs   Lab 12/22/20  0626 12/20/20  0744 12/19/20  1513   * 113* 133*     No results for input(s): LACT in the last 168 hours.  No results for input(s): TROPONIN, TROPI, TROPR in the last 168 hours.    Invalid input(s): TROP, TROPONINIES  Recent Labs   Lab 12/21/20  1608   COLOR Light Yellow   APPEARANCE Slightly Cloudy   URINEGLC Negative   URINEBILI Negative   URINEKETONE Trace*   SG 1.014   UBLD Moderate*   URINEPH 5.5   PROTEIN 10*   NITRITE Negative   LEUKEST Large*   RBCU 34*   WBCU >182*          Pending Results:    Unresulted Labs  Ordered in the Past 30 Days of this Admission     Date and Time Order Name Status Description    12/21/2020 1608 Urine Culture Aerobic Bacterial Preliminary     12/21/2020 0450 Urine Culture Aerobic Bacterial Preliminary            Discharge Instructions and Follow-Up:   Discharge diet: Orders Placed This Encounter      Advance Diet as Tolerated: Regular Diet Adult      Diet     Discharge activity: Activity as tolerated   Discharge follow-up: 1-2 weeks with PCP repeat CBC  Follow-up with orthopedic service as scheduled   Outpatient therapy: None    Other instructions: None      Hospital Course:  Ms. Jacobson is in good spirits this morning as she feels much better in terms of earlier pain and was able to sleep and rest overnight.  No significant reported events occurred or reported.  Stable hemodynamics.  Participated with therapy services.  Afebrile, not requiring any oxygen support.  Being optimized from therapy and orthopedic service.  Found with accompanying urinary tract infection and treated with antibiotics with ceftriaxone with pending urine bacterial culture can be follow-up as outpatient.  No prior history of resistant bacterial infection.  Will be sent home with 3 days course of Augmentin.  Highly appreciate input from orthopedic service who provided prescriptions for constipation prophylaxis and pain regimen.  We will continue her with aspirin and Plavix (home regimen for quite some time for history of femoral bypass graft and PAD)    I will refer you to excerpts of my prior progress notes as listed below for other details of her hospital stay.  Summary of Stay: Summary of Stay: Angeli Jacobson is a 70 year old female admitted on 12/19/2020 with left lower extremity fracture after slipping on ice at home.     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  She had superficial femoral arterial resection at that time as well and a graft was  placed.     Problem List:   1. Left midshaft tibiofibular fracture status post fall.  2. Right comminuted patellar fracture  3. Status post open reduction intramedullary nailing of left tibia fracture and left patella partial patellar fracture excision with advancement of quadriceps tendon  4. Peripheral arterial disease managed with Plavix and aspirin.  5. Hypothyroidism  6. Dyslipidemia      Continue current care.  I am anticipating Ms. Jacobson might need at least another 1-2 more days of hospitalization care given be needing therapy evaluation, optimize pain control.  -Advance diet as tolerated  -Stop IV fluids once on regular diet  -Repeat urine analysis as chart review showed earlier urine analysis done did show likely contaminated sample  -Receive antibiotics for perioperative pharmacal prophylaxis  -Home regimen for statins, Zetia, levothyroxine were resumed earlier  -Recheck hemoglobin postoperative state     DVT Prophylaxis: On mechanical PCD's, will await further instructions from orthopedic service regarding DVT prophylaxis   Code Status: Full Code  Discharge Dispo: To be determined  Estimated Disch Date / # of Days        Total time spent in face to face contact with the patient and coordinating discharge was:  > 30 Minutes.

## 2020-12-22 NOTE — PLAN OF CARE
Patient vital signs are at baseline: Yes  Patient able to ambulate as they were prior to admission or with assist devices provided by therapies during their stay:  No,  Reason:  Ax2, GB/W  Patient MUST void prior to discharge:  No,  Reason:  Benitez in place  Patient able to tolerate oral intake:  Yes  Pain has adequate pain control using Oral analgesics:  Yes w/ scheduled APAP

## 2020-12-22 NOTE — PROGRESS NOTES
[facility]      OUTPATIENT OCCUPATIONAL THERAPY  EVALUATION  PLAN OF TREATMENT FOR OUTPATIENT REHABILITATION  (COMPLETE FOR INITIAL CLAIMS ONLY)  Patient's Last Name, First Name, M.I.  YOB: 1950  Angeli Jacobson                          Provider's Name  [facility] Medical Record No.  [id number]                               Onset Date:  12/19/20   Start of Care Date:  (P) 12/22/20     Type:     ___PT   _X_OT   ___SLP Medical Diagnosis:  (P) S/p left tibia and patellar fracture ORIF                         OT Diagnosis:  Impaired ADLs, IADLs and mobility tasks   Visits from SOC:  1   _________________________________________________________________________________  Plan of Treatment/Functional Goals    Planned Interventions: ADL retraining, IADL retraining, strengthening, transfer training   Goals: See Occupational Therapy Goals on Care Plan in [facility] electronic health record.    Therapy Frequency: 1x eval and treat  Predicted Duration of Therapy Intervention:    _________________________________________________________________________________    I CERTIFY THE NEED FOR THESE SERVICES FURNISHED UNDER        THIS PLAN OF TREATMENT AND WHILE UNDER MY CARE     (Physician co-signature of this document indicates review and certification of the therapy plan).                Certification date from: (P) 12/22/20, Certification date to: (P) 12/22/20    Referring Physician: Azul Maguire PA-C            Initial Assessment        See Occupational Therapy evaluation dated (P) 12/22/20 in Epic electronic health record.

## 2020-12-22 NOTE — PLAN OF CARE
PM Shift Obs Note  Patient vital signs are at baseline: Yes  Patient able to ambulate as they were prior to admission or with assist devices provided by therapies during their stay:  No,  Reason: Just returned from surgery. Plan is to dangle and stand tonight.  Patient MUST void prior to discharge:  No,  Reason:  Benitez still in place until morning.  Patient able to tolerate oral intake:  Yes  Pain has adequate pain control using Oral analgesics:  Yes     Pt A/Ox4. VSS on 1L O2 and capno. LS diminished. Dressing CDI. +2 edema in left foot. Orthotics fitted her to a leg brace this afternoon. Benitez patent and secure. Pain controlled with tylenol. Scopolamine patch removed. UA ordered and reviewed by physician. Rocephin given d/t elevated WBC count. Plan is to dangle andstand tonight.

## 2020-12-23 ENCOUNTER — APPOINTMENT (OUTPATIENT)
Dept: PHYSICAL THERAPY | Facility: CLINIC | Age: 70
End: 2020-12-23
Payer: COMMERCIAL

## 2020-12-23 VITALS
HEART RATE: 88 BPM | TEMPERATURE: 97.7 F | OXYGEN SATURATION: 96 % | WEIGHT: 151 LBS | BODY MASS INDEX: 24.37 KG/M2 | RESPIRATION RATE: 16 BRPM | DIASTOLIC BLOOD PRESSURE: 67 MMHG | SYSTOLIC BLOOD PRESSURE: 121 MMHG

## 2020-12-23 LAB
BACTERIA SPEC CULT: ABNORMAL
BACTERIA SPEC CULT: NO GROWTH
GLUCOSE SERPL-MCNC: 117 MG/DL (ref 70–99)
HGB BLD-MCNC: 8.5 G/DL (ref 11.7–15.7)
Lab: ABNORMAL
Lab: NORMAL
SPECIMEN SOURCE: ABNORMAL
SPECIMEN SOURCE: NORMAL

## 2020-12-23 PROCEDURE — G0378 HOSPITAL OBSERVATION PER HR: HCPCS

## 2020-12-23 PROCEDURE — 250N000013 HC RX MED GY IP 250 OP 250 PS 637: Performed by: PHYSICIAN ASSISTANT

## 2020-12-23 PROCEDURE — 99207 PR CDG-CODE CATEGORY CHANGED: CPT | Performed by: INTERNAL MEDICINE

## 2020-12-23 PROCEDURE — 82947 ASSAY GLUCOSE BLOOD QUANT: CPT | Performed by: PHYSICIAN ASSISTANT

## 2020-12-23 PROCEDURE — 85018 HEMOGLOBIN: CPT | Performed by: PHYSICIAN ASSISTANT

## 2020-12-23 PROCEDURE — 97116 GAIT TRAINING THERAPY: CPT | Mod: GP | Performed by: PHYSICAL THERAPIST

## 2020-12-23 PROCEDURE — 36415 COLL VENOUS BLD VENIPUNCTURE: CPT | Performed by: PHYSICIAN ASSISTANT

## 2020-12-23 PROCEDURE — 99225 PR SUBSEQUENT OBSERVATION CARE,LEVEL II: CPT | Performed by: INTERNAL MEDICINE

## 2020-12-23 PROCEDURE — 97530 THERAPEUTIC ACTIVITIES: CPT | Mod: GP | Performed by: PHYSICAL THERAPIST

## 2020-12-23 RX ADMIN — ACETAMINOPHEN 975 MG: 325 TABLET, FILM COATED ORAL at 00:08

## 2020-12-23 RX ADMIN — THERA TABS 1 TABLET: TAB at 07:56

## 2020-12-23 RX ADMIN — OXYCODONE HYDROCHLORIDE 5 MG: 5 TABLET ORAL at 12:22

## 2020-12-23 RX ADMIN — ACETAMINOPHEN 975 MG: 325 TABLET, FILM COATED ORAL at 07:56

## 2020-12-23 RX ADMIN — EZETIMIBE 10 MG: 10 TABLET ORAL at 07:56

## 2020-12-23 RX ADMIN — DOCUSATE SODIUM AND SENNOSIDES 1 TABLET: 8.6; 5 TABLET ORAL at 07:59

## 2020-12-23 RX ADMIN — Medication 50 MCG: at 07:56

## 2020-12-23 RX ADMIN — DOCUSATE SODIUM 100 MG: 100 CAPSULE, LIQUID FILLED ORAL at 07:59

## 2020-12-23 RX ADMIN — OXYCODONE HYDROCHLORIDE 5 MG: 5 TABLET ORAL at 07:56

## 2020-12-23 RX ADMIN — LEVOTHYROXINE SODIUM 50 MCG: 50 TABLET ORAL at 07:56
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
## 2020-12-23 NOTE — PLAN OF CARE
Vital signs stable.RA, Sats 90's.  Lungs clear, encouraged inspirometer use.  Bowel sounds hypoactive, no nausea, tolerated diet.Ambulated with walker, gait belt, assist of 1, knee hinged brace on at all times.  CMS intact, dressing dry, moderate swelling to left leg. Ice pack applied, LLE elevated on pillows.  Hgb 8.6, recheck in am. Voiding.  Pain controlled with scheduled tylenol and  oxycodone.Will monitor.

## 2020-12-23 NOTE — PLAN OF CARE
Physical Therapy Discharge Summary    Reason for therapy discharge:    All goals and outcomes met, no further needs identified.    Progress towards therapy goal(s). See goals on Care Plan in [facility] electronic health record for goal details.  Goals met    Therapy recommendation(s):    No further therapy is recommended.

## 2020-12-23 NOTE — PLAN OF CARE
0300:  Patient vital signs are at baseline: Yes  Patient able to ambulate as they were prior to admission or with assist devices provided by therapies during their stay:  Yes AX1 GB/W  Patient MUST void prior to discharge:  Yes  Patient able to tolerate oral intake:  Yes  Pain has adequate pain control using Oral analgesics:  Yes, scheduled APAP and prn oxy.    0700:  Patient vital signs are at baseline: Yes  Patient able to ambulate as they were prior to admission or with assist devices provided by therapies during their stay:  Yes, Ax1 GB/W  Patient MUST void prior to discharge:  Yes  Patient able to tolerate oral intake:  Yes  Pain has adequate pain control using Oral analgesics:  Yes, scheduled APAP and prn oxy.    A&Ox4, VSS, Ax1 GB/W. LLE w/ hinged leg brace to be left in place at all times. +2 edema to L foot. LS clear, BS active, CMS intact. Dressing CDI. Denies N/V. Mild pain.    Plan to discharge upon stabilization of hgb and decision on anticoagulants to send pt home with.

## 2020-12-23 NOTE — PLAN OF CARE
Reviewed discharge instructions and medications with patient and spouse, Chris. Questions answered. Patient discharged to home with  driving, discharge instructions, medications (Oxycodone,Vitamin, D, Augmentin ), and belongings at this time.

## 2020-12-30 ENCOUNTER — TELEPHONE (OUTPATIENT)
Dept: FAMILY MEDICINE | Facility: CLINIC | Age: 70
End: 2020-12-30

## 2020-12-30 ENCOUNTER — NURSE TRIAGE (OUTPATIENT)
Dept: NURSING | Facility: CLINIC | Age: 70
End: 2020-12-30

## 2020-12-30 NOTE — TELEPHONE ENCOUNTER
"ED/Discharge Protocol    \"Hi, my name is Althea Mccloud RN, a registered nurse, and I am calling on behalf of Sepideh Burris PA-C office at [place].  I am calling to follow up and see how things are going for you after your recent visit.\"    \"I see that you were in the (ER/UC/IP) on 12/19/2020-12/23/2020.    How are you doing now that you are home?\" good    Is patient experiencing symptoms that may require a hospital visit?  no    Discharge Instructions    \"Let's review your discharge instructions.  What is/are the follow-up recommendations?  Pt. Response: follow-up with ortho on Monday 1/4/2020.  Reg diet no ot/pt needed.  Wound watch and keep covered.    \"Were you instructed to make a follow-up appointment?\"  Pt. Response: No.       \"When you see the provider, I would recommend that you bring your discharge instructions with you.    Medications    \"How many new medications are you on since your hospitalization/ED visit?\"    0-1  \"How many of your current medicines changed (dose, timing, name, etc.) while you were in the hospital/ED visit?\"   0-1  \"Do you have questions about your medications?\"   No  \"Were you newly diagnosed with heart failure, COPD, diabetes or did you have a heart attack?\"   No  For patients on insulin: \"Did you start on insulin in the hospital or did you have your insulin dose changed?\"   No  Post Discharge Medication Reconciliation Status: discharge medications reconciled, continue medications without change.    Was MTM referral placed (*Make sure to put transitions as reason for referral)?   No    Call Summary    \"Do you have any questions or concerns about your condition or care plan at the moment?\"    No  Triage nurse advice given: Call if needed    Patient was in ER twice in the past year (assess appropriateness of ER visits.)      \"If you have questions or things don't continue to improve, we encourage you contact us through the main clinic number,  [phone].  Even if the clinic is " "not open, triage nurses are available 24/7 to help you.     We would like you to know that our clinic has extended hours (provide information).  We also have urgent care (provide details on closest location and hours/contact info)\"      \"Thank you for your time and take care!\"    Althea SARAVIA RN, BSN, PAL (Patient Advocate Liaison)  [facility]   [phone]          "

## 2020-12-30 NOTE — TELEPHONE ENCOUNTER
Pt was in [facility] from 12/19/2020-12/23/2020     S/P Fall L tibfib fx.  On 12/21/2020 pt had a Open reduction intramedullary nailing of left tibia fracture    START taking:  acetaminophen (TYLENOL)  amoxicillin-clavulanate (AUGMENTIN)  oxyCODONE (ROXICODONE)  polyethylene glycol (MIRALAX)  Vitamin D3 (CHOLECALCIFEROL)  CHANGE how you take:  alendronate (FOSAMAX)  STOP taking:  nitroFURantoin macrocrystal-monohydrate 100 MG  capsule (MACROBID)  Your activity upon discharge: activity as tolerated, ambulate in house, no driving while on analgesics and keep knee immobilizer on at all times - no bending the left knee    Follow this diet upon discharge:  Advance Diet as Tolerated: Regular Diet Adult  Discharge Instructions  50% WB on the left LE  Wound care and dressings  Instructions to care for your wound at home: as directed, ice to area for comfort and reinforce dressing as needed.  Follow-up and recommended labs and tests    Home care PT/OT referrals written at discharge.    Pt now has a DME for walker/cane    LM on  to call this RN back if he should call the main clinic please forward to me [phone]  Althea Mccloud RN

## 2020-12-30 NOTE — TELEPHONE ENCOUNTER
Patient was transferred to triage. Patient is unsure what this call is about?    No questions from patient, does the clinic have any questions for the patient?    Please advise, should patient make a inpatient hospitalization follow up with clinic?     Please see nursing note from today.     Call patient back if you need anything further.     Daina Putnam RN on 12/30/2020 at 8:41 AM      Additional Information    Caller has already spoken to PCP or another triager    Non-urgent call redirected to PCP's office because it is open    Protocols used: INFORMATION ONLY CALL-A-AH, NO CONTACT OR DUPLICATE CONTACT CALL-A-AH

## 2020-12-31 ENCOUNTER — PRE VISIT (OUTPATIENT)
Dept: UROLOGY | Facility: CLINIC | Age: 70
End: 2020-12-31

## 2020-12-31 NOTE — TELEPHONE ENCOUNTER
Reason for visit: Medication follow up                 Relevant information: PFD     Records/imaging/labs/orders: pt cancelled her BP check and PVR      Pt called: message routed to scheduling team to move pt to virtual day

## 2021-01-04 ENCOUNTER — TRANSFERRED RECORDS (OUTPATIENT)
Dept: HEALTH INFORMATION MANAGEMENT | Facility: CLINIC | Age: 71
End: 2021-01-04

## 2021-01-04 DIAGNOSIS — N32.81 OAB (OVERACTIVE BLADDER): ICD-10-CM

## 2021-01-04 RX ORDER — FESOTERODINE FUMARATE 4 MG/1
4 TABLET, FILM COATED, EXTENDED RELEASE ORAL DAILY
Qty: 90 TABLET | Refills: 1 | Status: SHIPPED | OUTPATIENT
Start: 2021-01-04 | End: 2021-01-28

## 2021-01-07 ENCOUNTER — THERAPY VISIT (OUTPATIENT)
Dept: PHYSICAL THERAPY | Facility: CLINIC | Age: 71
End: 2021-01-07
Payer: COMMERCIAL

## 2021-01-07 DIAGNOSIS — Z47.89 AFTERCARE FOLLOWING SURGERY OF THE MUSCULOSKELETAL SYSTEM: ICD-10-CM

## 2021-01-07 PROBLEM — M25.562 ACUTE PAIN OF LEFT KNEE: Status: ACTIVE | Noted: 2019-10-29

## 2021-01-07 PROCEDURE — 97161 PT EVAL LOW COMPLEX 20 MIN: CPT | Mod: GP | Performed by: PHYSICAL THERAPIST

## 2021-01-07 PROCEDURE — 97110 THERAPEUTIC EXERCISES: CPT | Mod: GP | Performed by: PHYSICAL THERAPIST

## 2021-01-07 ASSESSMENT — ACTIVITIES OF DAILY LIVING (ADL)
STAND: ACTIVITY IS FAIRLY DIFFICULT
SQUAT: I AM UNABLE TO DO THE ACTIVITY
KNEE_ACTIVITY_OF_DAILY_LIVING_SUM: 19
GO DOWN STAIRS: ACTIVITY IS FAIRLY DIFFICULT
RAW_SCORE: 19
HOW_WOULD_YOU_RATE_THE_CURRENT_FUNCTION_OF_YOUR_KNEE_DURING_YOUR_USUAL_DAILY_ACTIVITIES_ON_A_SCALE_FROM_0_TO_100_WITH_100_BEING_YOUR_LEVEL_OF_KNEE_FUNCTION_PRIOR_TO_YOUR_INJURY_AND_0_BEING_THE_INABILITY_TO_PERFORM_ANY_OF_YOUR_USUAL_DAILY_ACTIVITIES?: 25
KNEE_ACTIVITY_OF_DAILY_LIVING_SCORE: 27.14
KNEEL ON THE FRONT OF YOUR KNEE: I AM UNABLE TO DO THE ACTIVITY
KNEE_ACTIVITY_OF_DAILY_LIVING_SCORE: 27.14
PAIN: THE SYMPTOM AFFECTS MY ACTIVITY SEVERELY
HOW_WOULD_YOU_RATE_THE_OVERALL_FUNCTION_OF_YOUR_KNEE_DURING_YOUR_USUAL_DAILY_ACTIVITIES?: SEVERELY ABNORMAL
LIMPING: THE SYMPTOM AFFECTS MY ACTIVITY SEVERELY
AS_A_RESULT_OF_YOUR_KNEE_INJURY,_HOW_WOULD_YOU_RATE_YOUR_CURRENT_LEVEL_OF_DAILY_ACTIVITY?: SEVERELY ABNORMAL
WEAKNESS: THE SYMPTOM AFFECTS MY ACTIVITY SEVERELY
STIFFNESS: THE SYMPTOM AFFECTS MY ACTIVITY SEVERELY
RISE FROM A CHAIR: ACTIVITY IS VERY DIFFICULT
SIT WITH YOUR KNEE BENT: I AM UNABLE TO DO THE ACTIVITY
GO UP STAIRS: ACTIVITY IS FAIRLY DIFFICULT
WALK: ACTIVITY IS FAIRLY DIFFICULT
SWELLING: THE SYMPTOM AFFECTS MY ACTIVITY SEVERELY
GIVING WAY, BUCKLING OR SHIFTING OF KNEE: I DO NOT HAVE THE SYMPTOM
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
## 2021-01-11 ENCOUNTER — THERAPY VISIT (OUTPATIENT)
Dept: PHYSICAL THERAPY | Facility: CLINIC | Age: 71
End: 2021-01-11
Payer: COMMERCIAL

## 2021-01-11 DIAGNOSIS — Z47.89 AFTERCARE FOLLOWING SURGERY OF THE MUSCULOSKELETAL SYSTEM: ICD-10-CM

## 2021-01-11 DIAGNOSIS — M25.562 ACUTE PAIN OF LEFT KNEE: ICD-10-CM

## 2021-01-11 PROCEDURE — 97110 THERAPEUTIC EXERCISES: CPT | Mod: GP | Performed by: PHYSICAL THERAPIST

## 2021-01-14 ENCOUNTER — TELEPHONE (OUTPATIENT)
Dept: UROLOGY | Facility: CLINIC | Age: 71
End: 2021-01-14

## 2021-01-14 ENCOUNTER — THERAPY VISIT (OUTPATIENT)
Dept: PHYSICAL THERAPY | Facility: CLINIC | Age: 71
End: 2021-01-14
Payer: COMMERCIAL

## 2021-01-14 DIAGNOSIS — M25.562 ACUTE PAIN OF LEFT KNEE: ICD-10-CM

## 2021-01-14 DIAGNOSIS — Z47.89 AFTERCARE FOLLOWING SURGERY OF THE MUSCULOSKELETAL SYSTEM: ICD-10-CM

## 2021-01-14 DIAGNOSIS — M79.662 PAIN OF LEFT LOWER LEG: ICD-10-CM

## 2021-01-14 PROCEDURE — 97110 THERAPEUTIC EXERCISES: CPT | Mod: GP | Performed by: PHYSICAL THERAPIST

## 2021-01-14 NOTE — TELEPHONE ENCOUNTER
[facility] Call Center    Phone Message    May a detailed message be left on voicemail: yes     Reason for Call: Other: pt stated she is not able to wait any longer for her virtual appt today, she would like a call back from a nurse, please call pt thank you     Action Taken: Message routed to:  [facility] ([facility] uro    Travel Screening: Not Applicable

## 2021-01-18 ENCOUNTER — MYC MEDICAL ADVICE (OUTPATIENT)
Dept: FAMILY MEDICINE | Facility: CLINIC | Age: 71
End: 2021-01-18

## 2021-01-18 DIAGNOSIS — Z12.11 SCREEN FOR COLON CANCER: Primary | ICD-10-CM

## 2021-01-19 ENCOUNTER — THERAPY VISIT (OUTPATIENT)
Dept: PHYSICAL THERAPY | Facility: CLINIC | Age: 71
End: 2021-01-19
Payer: COMMERCIAL

## 2021-01-19 DIAGNOSIS — M25.562 ACUTE PAIN OF LEFT KNEE: ICD-10-CM

## 2021-01-19 DIAGNOSIS — Z47.89 AFTERCARE FOLLOWING SURGERY OF THE MUSCULOSKELETAL SYSTEM: ICD-10-CM

## 2021-01-19 PROCEDURE — 97110 THERAPEUTIC EXERCISES: CPT | Mod: GP | Performed by: PHYSICAL THERAPIST

## 2021-01-20 ENCOUNTER — PRE VISIT (OUTPATIENT)
Dept: UROLOGY | Facility: CLINIC | Age: 71
End: 2021-01-20

## 2021-01-20 NOTE — TELEPHONE ENCOUNTER
Reason for visit: Medication follow up                 Relevant information: PFD     Records/imaging/labs/orders: pt cancelled her BP check and PVR      Pt called: no need for a call

## 2021-01-21 ENCOUNTER — MYC MEDICAL ADVICE (OUTPATIENT)
Dept: FAMILY MEDICINE | Facility: CLINIC | Age: 71
End: 2021-01-21

## 2021-01-21 ENCOUNTER — HOSPITAL ENCOUNTER (OUTPATIENT)
Dept: OCCUPATIONAL THERAPY | Facility: CLINIC | Age: 71
Setting detail: THERAPIES SERIES
End: 2021-01-21
Attending: ORTHOPAEDIC SURGERY
Payer: COMMERCIAL

## 2021-01-21 PROCEDURE — 97140 MANUAL THERAPY 1/> REGIONS: CPT | Mod: GO | Performed by: OCCUPATIONAL THERAPIST

## 2021-01-21 NOTE — TELEPHONE ENCOUNTER
See my chart message     Le Thomas, Registered Nurse, PAL (Patient Advocate Liason)   [facility]   [phone]

## 2021-01-22 ENCOUNTER — THERAPY VISIT (OUTPATIENT)
Dept: PHYSICAL THERAPY | Facility: CLINIC | Age: 71
End: 2021-01-22
Payer: COMMERCIAL

## 2021-01-22 DIAGNOSIS — M25.562 ACUTE PAIN OF LEFT KNEE: ICD-10-CM

## 2021-01-22 DIAGNOSIS — Z47.89 AFTERCARE FOLLOWING SURGERY OF THE MUSCULOSKELETAL SYSTEM: ICD-10-CM

## 2021-01-22 PROCEDURE — 97110 THERAPEUTIC EXERCISES: CPT | Mod: GP | Performed by: PHYSICAL THERAPIST

## 2021-01-26 ENCOUNTER — THERAPY VISIT (OUTPATIENT)
Dept: PHYSICAL THERAPY | Facility: CLINIC | Age: 71
End: 2021-01-26
Payer: COMMERCIAL

## 2021-01-26 DIAGNOSIS — M25.562 ACUTE PAIN OF LEFT KNEE: ICD-10-CM

## 2021-01-26 DIAGNOSIS — Z47.89 AFTERCARE FOLLOWING SURGERY OF THE MUSCULOSKELETAL SYSTEM: ICD-10-CM

## 2021-01-26 PROCEDURE — 97110 THERAPEUTIC EXERCISES: CPT | Mod: GP | Performed by: PHYSICAL THERAPIST

## 2021-01-28 ENCOUNTER — VIRTUAL VISIT (OUTPATIENT)
Dept: UROLOGY | Facility: CLINIC | Age: 71
End: 2021-01-28
Payer: COMMERCIAL

## 2021-01-28 DIAGNOSIS — N32.81 OAB (OVERACTIVE BLADDER): ICD-10-CM

## 2021-01-28 PROCEDURE — 99213 OFFICE O/P EST LOW 20 MIN: CPT | Mod: 95 | Performed by: UROLOGY

## 2021-01-28 RX ORDER — FESOTERODINE FUMARATE 4 MG/1
4 TABLET, FILM COATED, EXTENDED RELEASE ORAL DAILY
Qty: 90 TABLET | Refills: 3 | Status: SHIPPED | OUTPATIENT
Start: 2021-01-28 | End: 2021-05-11

## 2021-01-28 NOTE — LETTER
1/28/2021       RE: Angeli Jacobson  [address]  [facility] [address]     Dear Colleague,    Thank you for referring your patient, Angeli Jacobson, to the [facility] UROLOGY CLINIC [place] at [facility]. Please see a copy of my visit note below.    Angeli is a 70 year old who is being evaluated via a billable video visit.      How would you like to obtain your AVS? MyChart  If the video visit is dropped, the invitation should be resent by: Send to e-mail at: [email or web address]  Will anyone else be joining your video visit? No      Video Start Time: 1:24PM  Assessment & Plan     OAB (overactive bladder)  Stable, continue the medication.  It was refilled    - fesoterodine fumarate (TOVIAZ) 4 MG TB24 24 hr tablet; Take 1 tablet (4 mg) by mouth daily    Follow up in about 6    Jennifer Oquendo MD MPH  (she/her/hers)   of Urology  [facility] [place]    Carmel Suh is a 70 year old who presents to clinic today for the following health issues     HPI     OAB medication check, Currently on fesoterodine which is okay    Oxybutynin stopped because of the dryness    Mirabegron stopped because it made symptoms worse    Voiding every 2 hours    Nocturia x2    Symptoms not perfect but wants to leave things as is for now.  Recovering from a leg fracture December          Objective           Vitals:  No vitals were obtained today due to virtual visit.    Physical Exam   GENERAL: Healthy, alert and no distress  EYES: Eyes grossly normal to inspection.  No discharge or erythema, or obvious scleral/conjunctival abnormalities.  RESP: No audible wheeze, cough, or visible cyanosis.  No visible retractions or increased work of breathing.    SKIN: Visible skin clear. No significant rash, abnormal pigmentation or lesions.  NEURO: Cranial nerves grossly intact.  Mentation and speech appropriate for  age.  PSYCH: Mentation appears normal, normal affect normal/bright, judgement and insight intact, normal speech and appearance well-groomed.    Video-Visit Details    Type of service:  Video Visit    Video End Time:1:31 PM    Originating Location (pt. Location): Home    Distant Location (provider location):  [facility] UROLOGY CLINIC [place]     Platform used for Video Visit: [employer]

## 2021-01-28 NOTE — PROGRESS NOTES
Angeli is a 70 year old who is being evaluated via a billable video visit.      How would you like to obtain your AVS? MyChart  If the video visit is dropped, the invitation should be resent by: Send to e-mail at: [email or web address]  Will anyone else be joining your video visit? No      Video Start Time: 1:24PM  Assessment & Plan     OAB (overactive bladder)  Stable, continue the medication.  It was refilled    - fesoterodine fumarate (TOVIAZ) 4 MG TB24 24 hr tablet; Take 1 tablet (4 mg) by mouth daily    Follow up in about 6    Jennifer Oquendo MD MPH  (she/her/hers)   of Urology  [facility] CL[facility]IC [place]    Carmel Suh is a 70 year old who presents to clinic today for the following health issues     HPI     OAB medication check, Currently on fesoterodine which is okay    Oxybutynin stopped because of the dryness    Mirabegron stopped because it made symptoms worse    Voiding every 2 hours    Nocturia x2    Symptoms not perfect but wants to leave things as is for now.  Recovering from a leg fracture December          Objective           Vitals:  No vitals were obtained today due to virtual visit.    Physical Exam   GENERAL: Healthy, alert and no distress  EYES: Eyes grossly normal to inspection.  No discharge or erythema, or obvious scleral/conjunctival abnormalities.  RESP: No audible wheeze, cough, or visible cyanosis.  No visible retractions or increased work of breathing.    SKIN: Visible skin clear. No significant rash, abnormal pigmentation or lesions.  NEURO: Cranial nerves grossly intact.  Mentation and speech appropriate for age.  PSYCH: Mentation appears normal, normal affect normal/bright, judgement and insight intact, normal speech and appearance well-groomed.    Video-Visit Details    Type of service:  Video Visit    Video End Time:1:31 PM    Originating Location (pt. Location): Home    Distant Location (provider location):  [facility]  UROLOGY CLINIC [place]     Platform used for Video Visit: Jenni

## 2021-01-28 NOTE — PATIENT INSTRUCTIONS
Continue the fesoterodine    Websites with free information:    [employer] patient website: www.voicesforpfd.org    Total Control Program: www.totalcontrolprogram.com    It was a pleasure meeting with you today.  Thank you for allowing me and my team the privilege of caring for you today.  YOU are the reason we are here, and I truly hope we provided you with the excellent service you deserve.  Please let us know if there is anything else we can do for you so that we can be sure you are leaving completely satisfied with your care experience.

## 2021-01-29 ENCOUNTER — THERAPY VISIT (OUTPATIENT)
Dept: PHYSICAL THERAPY | Facility: CLINIC | Age: 71
End: 2021-01-29
Payer: COMMERCIAL

## 2021-01-29 DIAGNOSIS — Z47.89 AFTERCARE FOLLOWING SURGERY OF THE MUSCULOSKELETAL SYSTEM: ICD-10-CM

## 2021-01-29 DIAGNOSIS — M25.562 ACUTE PAIN OF LEFT KNEE: ICD-10-CM

## 2021-01-29 PROCEDURE — 97110 THERAPEUTIC EXERCISES: CPT | Mod: GP | Performed by: PHYSICAL THERAPIST

## 2021-02-02 ENCOUNTER — THERAPY VISIT (OUTPATIENT)
Dept: PHYSICAL THERAPY | Facility: CLINIC | Age: 71
End: 2021-02-02
Payer: COMMERCIAL

## 2021-02-02 DIAGNOSIS — Z47.89 AFTERCARE FOLLOWING SURGERY OF THE MUSCULOSKELETAL SYSTEM: ICD-10-CM

## 2021-02-02 DIAGNOSIS — M25.562 ACUTE PAIN OF LEFT KNEE: ICD-10-CM

## 2021-02-02 PROCEDURE — 97530 THERAPEUTIC ACTIVITIES: CPT | Mod: GP | Performed by: PHYSICAL THERAPIST

## 2021-02-02 NOTE — PROGRESS NOTES
Subjective:  HPI  Physical Exam                    Objective:  System    Physical Exam    General     ROS    Assessment/Plan:    PROGRESS  REPORT    Progress reporting period is from IE to 2/2/2021.       SUBJECTIVE  Subjective changes noted by patient:  .  Subjective: Noting less soreness in break area and feeling muscle stretch vs pain with exercises    Current pain level is  Current Pain level: 2/10.     Previous pain level was   Initial Pain level: 3/10.   Changes in function:  Yes (See Goal flowsheet attached for changes in current functional level)  Adverse reaction to treatment or activity: activity - had lymph edema flare up but now back under control.    OBJECTIVE  Changes noted in objective findings:    Objective: PROM: 0-0-49* (pt was 0-0-88* on 12/3/2020 prior to injury/surgery)     ASSESSMENT/PLAN  Updated problem list and treatment plan: Diagnosis 1:  S/P L tib ORIF with quad tendon advancement  Pain -  hot/cold therapy, manual therapy and home program  Decreased ROM/flexibility - manual therapy and therapeutic exercise  Decreased strength - therapeutic exercise and therapeutic activities  Decreased proprioception - neuro re-education and therapeutic activities  Edema - cold therapy  Impaired gait - gait training and assistive devices  Impaired muscle performance - neuro re-education  Decreased function - therapeutic activities  STG/LTGs have been met or progress has been made towards goals:  Yes (See Goal flow sheet completed today.)  Assessment of Progress: The patient's condition is improving.  The patient's condition has potential to improve.  Self Management Plans:  Patient has been instructed in a home treatment program.  Patient  has been instructed in self management of symptoms.  I have re-evaluated this patient and find that the nature, scope, duration and intensity of the therapy is appropriate for the medical condition of the patient.  Anegli continues to require the following  intervention to meet STG and LTG's:  PT    Recommendations:  This patient would benefit from continued therapy.     Frequency:  2 X week, once daily  Duration:  for 6 weeks        Please refer to the daily flowsheet for treatment today, total treatment time and time spent performing 1:1 timed codes.

## 2021-02-02 NOTE — LETTER
[facility] ATHLETIC [facility] PHYSICAL [facility]  [address]  [facility] [address]  [phone]    2021    Re: Angeli Jacobson   :   1950  MRN:  [id number]   REFERRING PHYSICIAN:   Stephen Rhodes    [facility]  Date of Initial Evaluation:  21  Visits:  Rxs Used: 8  Reason for Referral: Aftercare following surgery of the musculoskeletal system; Acute pain of left knee     PROGRESS  REPORT  Progress reporting period is from IE to 2021.       SUBJECTIVE  Subjective changes noted by patient: Subjective: Noting less soreness in break area and feeling muscle stretch vs pain with exercises    Current pain level is  Current Pain level: 2/10.     Previous pain level was   Initial Pain level: 3/10.   Changes in function:  Yes (See Goal flowsheet attached for changes in current functional level)  Adverse reaction to treatment or activity: activity - had lymph edema flare up but now back under control.    OBJECTIVE  Changes noted in objective findings:    Objective: PROM: 0-0-49* (pt was 0-0-88* on 12/3/2020 prior to injury/surgery)     ASSESSMENT/PLAN  Updated problem list and treatment plan: Diagnosis 1:  S/P L tib ORIF with quad tendon advancement  Pain -  hot/cold therapy, manual therapy and home program  Decreased ROM/flexibility - manual therapy and therapeutic exercise  Decreased strength - therapeutic exercise and therapeutic activities  Decreased proprioception - neuro re-education and therapeutic activities  Edema - cold therapy  Impaired gait - gait training and assistive devices  Impaired muscle performance - neuro re-education  Decreased function - therapeutic activities  STG/LTGs have been met or progress has been made towards goals:  Yes (See Goal flow sheet completed today.)  Assessment of Progress: The patient's condition is improving.  The patient's condition has potential to improve.  Self  Management Plans:  Patient has been instructed in a home treatment program.  Patient  has been instructed in self management of symptoms.  I have re-evaluated this patient and find that the nature, scope, duration and intensity of the therapy is appropriate for the medical condition of the patient.  Angeli continues to require the following intervention to meet STG and LTG's:  PT          Re: Angeli ALBA Kavon   :   1950          Recommendations:  This patient would benefit from continued therapy.     Frequency:  2 X week, once daily  Duration:  for 6 weeks    Thank you for your referral.    INQUIRIES  Therapist: Chaka Paulino, [facility]  [facility] [place]  [id number] [place] [id number]  Phone: [phone]  Fax: [phone]

## 2021-02-04 ENCOUNTER — TRANSFERRED RECORDS (OUTPATIENT)
Dept: HEALTH INFORMATION MANAGEMENT | Facility: CLINIC | Age: 71
End: 2021-02-04

## 2021-02-05 ENCOUNTER — THERAPY VISIT (OUTPATIENT)
Dept: PHYSICAL THERAPY | Facility: CLINIC | Age: 71
End: 2021-02-05
Payer: COMMERCIAL

## 2021-02-05 DIAGNOSIS — M25.562 ACUTE PAIN OF LEFT KNEE: ICD-10-CM

## 2021-02-05 DIAGNOSIS — Z47.89 AFTERCARE FOLLOWING SURGERY OF THE MUSCULOSKELETAL SYSTEM: ICD-10-CM

## 2021-02-05 PROCEDURE — 97110 THERAPEUTIC EXERCISES: CPT | Mod: GP | Performed by: PHYSICAL THERAPIST

## 2021-02-09 ENCOUNTER — THERAPY VISIT (OUTPATIENT)
Dept: PHYSICAL THERAPY | Facility: CLINIC | Age: 71
End: 2021-02-09
Payer: COMMERCIAL

## 2021-02-09 ENCOUNTER — MYC MEDICAL ADVICE (OUTPATIENT)
Dept: FAMILY MEDICINE | Facility: CLINIC | Age: 71
End: 2021-02-09

## 2021-02-09 DIAGNOSIS — E78.5 HYPERLIPIDEMIA LDL GOAL <70: ICD-10-CM

## 2021-02-09 DIAGNOSIS — Z47.89 AFTERCARE FOLLOWING SURGERY OF THE MUSCULOSKELETAL SYSTEM: ICD-10-CM

## 2021-02-09 DIAGNOSIS — M25.562 ACUTE PAIN OF LEFT KNEE: ICD-10-CM

## 2021-02-09 LAB
CHOLEST SERPL-MCNC: 220 MG/DL
HDLC SERPL-MCNC: 59 MG/DL
LDLC SERPL CALC-MCNC: 133 MG/DL
NONHDLC SERPL-MCNC: 161 MG/DL
TRIGL SERPL-MCNC: 140 MG/DL

## 2021-02-09 PROCEDURE — 36415 COLL VENOUS BLD VENIPUNCTURE: CPT | Performed by: INTERNAL MEDICINE

## 2021-02-09 PROCEDURE — 97110 THERAPEUTIC EXERCISES: CPT | Mod: GP | Performed by: PHYSICAL THERAPIST

## 2021-02-09 PROCEDURE — 80061 LIPID PANEL: CPT | Performed by: INTERNAL MEDICINE

## 2021-02-11 ENCOUNTER — MYC MEDICAL ADVICE (OUTPATIENT)
Dept: FAMILY MEDICINE | Facility: CLINIC | Age: 71
End: 2021-02-11

## 2021-02-11 DIAGNOSIS — S82.202A CLOSED FRACTURE OF LEFT TIBIA AND FIBULA, INITIAL ENCOUNTER: ICD-10-CM

## 2021-02-11 DIAGNOSIS — S82.002A CLOSED DISPLACED FRACTURE OF LEFT PATELLA, UNSPECIFIED FRACTURE MORPHOLOGY, INITIAL ENCOUNTER: ICD-10-CM

## 2021-02-11 DIAGNOSIS — S82.402A CLOSED FRACTURE OF LEFT TIBIA AND FIBULA, INITIAL ENCOUNTER: ICD-10-CM

## 2021-02-11 RX ORDER — VITAMIN B COMPLEX
50 TABLET ORAL DAILY
Qty: 60 TABLET | Refills: 0 | Status: SHIPPED | OUTPATIENT
Start: 2021-02-11 | End: 2023-04-10

## 2021-02-12 ENCOUNTER — THERAPY VISIT (OUTPATIENT)
Dept: PHYSICAL THERAPY | Facility: CLINIC | Age: 71
End: 2021-02-12
Payer: COMMERCIAL

## 2021-02-12 DIAGNOSIS — M25.562 ACUTE PAIN OF LEFT KNEE: ICD-10-CM

## 2021-02-12 DIAGNOSIS — Z47.89 AFTERCARE FOLLOWING SURGERY OF THE MUSCULOSKELETAL SYSTEM: ICD-10-CM

## 2021-02-12 PROCEDURE — 97110 THERAPEUTIC EXERCISES: CPT | Mod: GP | Performed by: PHYSICAL THERAPIST

## 2021-02-16 ENCOUNTER — VIRTUAL VISIT (OUTPATIENT)
Dept: SURGERY | Facility: CLINIC | Age: 71
End: 2021-02-16
Attending: INTERNAL MEDICINE
Payer: COMMERCIAL

## 2021-02-16 ENCOUNTER — THERAPY VISIT (OUTPATIENT)
Dept: PHYSICAL THERAPY | Facility: CLINIC | Age: 71
End: 2021-02-16
Payer: COMMERCIAL

## 2021-02-16 VITALS — HEIGHT: 66 IN | BODY MASS INDEX: 22.18 KG/M2 | WEIGHT: 138 LBS

## 2021-02-16 DIAGNOSIS — Z95.828 HISTORY OF ARTERIAL BYPASS OF LOWER EXTREMITY: ICD-10-CM

## 2021-02-16 DIAGNOSIS — I89.0 LYMPHEDEMA OF LEFT LEG: Primary | ICD-10-CM

## 2021-02-16 DIAGNOSIS — Z47.89 AFTERCARE FOLLOWING SURGERY OF THE MUSCULOSKELETAL SYSTEM: ICD-10-CM

## 2021-02-16 DIAGNOSIS — E03.9 HYPOTHYROIDISM, UNSPECIFIED TYPE: ICD-10-CM

## 2021-02-16 DIAGNOSIS — E78.5 HYPERLIPIDEMIA LDL GOAL <70: ICD-10-CM

## 2021-02-16 DIAGNOSIS — M25.562 ACUTE PAIN OF LEFT KNEE: ICD-10-CM

## 2021-02-16 DIAGNOSIS — C49.9 MYXOID LIPOSARCOMA (H): ICD-10-CM

## 2021-02-16 PROCEDURE — 97110 THERAPEUTIC EXERCISES: CPT | Mod: GP | Performed by: PHYSICAL THERAPIST

## 2021-02-16 PROCEDURE — 99214 OFFICE O/P EST MOD 30 MIN: CPT | Mod: 95 | Performed by: INTERNAL MEDICINE

## 2021-02-16 RX ORDER — EZETIMIBE 10 MG/1
10 TABLET ORAL DAILY
Qty: 90 TABLET | Refills: 3 | Status: SHIPPED | OUTPATIENT
Start: 2021-02-16 | End: 2022-03-11

## 2021-02-16 RX ORDER — CLOPIDOGREL BISULFATE 75 MG/1
75 TABLET ORAL DAILY
Qty: 90 TABLET | Refills: 3 | Status: SHIPPED | OUTPATIENT
Start: 2021-02-16 | End: 2022-03-04

## 2021-02-16 RX ORDER — ROSUVASTATIN CALCIUM 40 MG/1
40 TABLET, COATED ORAL DAILY
Qty: 90 TABLET | Refills: 3 | Status: SHIPPED | OUTPATIENT
Start: 2021-02-16 | End: 2021-06-28

## 2021-02-16 ASSESSMENT — MIFFLIN-ST. JEOR: SCORE: 1162.71

## 2021-02-16 NOTE — PROGRESS NOTES
SUBJECTIVE:  CC:  Follow up visit  Recent left leg/patella Fx s/p repair   Review of labs   Tolerating crestor 20 mg and zetia etc    HPI   Angeli Jacobson is a 70 year old female with past medical history of peripheral arterial disease with multiple interventions, hypothyroidism, hyperlipidemia admitted to hospital on December 19, 2020 after a fall and found Left midshaft tibiofibular fracture a nd Right comminuted patellar fracture  Status post open reduction intramedullary nailing of left tibia fracture and left patella partial patellar fracture excision with advancement of quadriceps tendon on December 20, 2020 and discharged home 2 days later  During last visit discontinued pravastatin and initiated Crestor 20 mg daily tolerating without any problems  She is scheduled to see orthopedic doctor for follow-up, she still wearing the brace    Reviewed recent labs and hospitalization records in the epic  LDL is not at goal and elevated triglycerides  She gained weight     HISTORIES:  PROBLEM LIST:   Patient Active Problem List   Diagnosis     MULTINODUL GOITER     Hearing loss     Hyperlipidemia LDL goal <70     Osteoporosis     Impaired fasting glucose     Lymphedema of left leg     Tubular adenoma     Vertigo     Myxoid liposarcoma (HCC)     PAD (peripheral artery disease) (H)     Vascular graft occlusion (H)     Femoral-popliteal bypass graft occlusion, left (H)     History of sarcoma     S/P ORIF (open reduction internal fixation) fracture     Hip pain, left     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)     Postural urinary incontinence     Personal history of urinary tract infection     OAB (overactive bladder)     Myalgia of pelvic floor     Lesion of bladder     S/P total hip arthroplasty     Acute pain of left knee     Closed fracture of left tibia and fibula, initial encounter     Closed displaced fracture of left patella, unspecified fracture morphology, initial encounter     Aftercare following  surgery of the musculoskeletal system     PAST MEDICAL HISTORY:  Past Medical History:   Diagnosis Date     * * * SBE PROPHYLAXIS * * * 1998    Amox 500mg, take 4 tabs one hour prior to procedure.Takes this because of lymphedema secondary from leg surgey     Central serous retinopathy 2001    Resolved 9/2001     CHRONIC NECK PAIN 1995     Depressive disorder      Depressive disorder, not elsewhere classified 2001     History of blood transfusion 04/2019    during left hip pinning     MIXED HYPERLIPIDEMIA, LDL GOAL <160 1998    LDL goal < 160     Motion sickness      MYXOID LIPOSARCOMA 2000    Left thigh, S/P excision, radiation  at [facility]     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    CHRONIC LEFT THIGH LYMPHEDEMA     Nontoxic multinodular goiter 2005    needs yearly US     Osteoporosis, unspecified 2001     Other lymphedema 2000    left thigh, gets regular PT for this     PAD (peripheral artery disease) (H) 4/20/2018     PONV (postoperative nausea and vomiting)      SHINGLES 2001     Sprain of lumbosacral (joint) (ligament) 1995    right     Unspecified hearing loss 1998    chronic tinnitus     Unspecified tinnitus 1998     PAST SURGICAL HISTORY:  Past Surgical History:   Procedure Laterality Date     ARTHROPLASTY HIP Left 10/4/2019    Procedure: Removal of left femoral hardware with a conversion to left total hip arthroplasty using a Biomet Annalisa femoral stem with an OsseoTi acetabular shell and a dual mobility bearing surface;  Surgeon: Spencer Celeste MD;  Location:  OR     BYPASS GRAFT FEMOROPOPLITEAL Left 1/21/2019    Procedure: LEFT FEMORAL TO ABOVE KNEE POPLITEAL  BYPASS WITH POLYTETRAFLUOROETHYLENE GRAFT;  Surgeon: Shade Owens MD;  Location:  OR     C APPENDECTOMY      Appendectomy     COLONOSCOPY N/A 2/5/2016    Procedure: COMBINED COLONOSCOPY, SINGLE OR MULTIPLE BIOPSY/POLYPECTOMY BY BIOPSY;  Surgeon: Varun Stanley MD, MD;  Location:  GI     CYSTOSCOPY       EXCISION [facility]  LESION>1.25CM  5/2000    Myxoid Liposarcoma       EXPLORE GROIN Right 5/1/2018    Procedure: EXPLORE GROIN;  EMERGENCY RIGHT FEMORAL EXPLORATION WITH FEMORAL ARTERY REPAIR.    EBL: 50mL;  Surgeon: Shade Owens MD;  Location: [facility] OR     HC COLONOSCOPY THRU STOMA, DIAGNOSTIC  2006    due 2010     HC COLP CERVIX/UPPER VAGINA  07/1997    Negative     HC DILATION/CURETTAGE DIAG/THER NON OB  02/1997    Post menopausal bleeding on HRT, negative     HIP SURGERY Left 04/13/2019     IR ANGIOGRAM THROUGH CATHETER FOLLOW UP  12/20/2018     IR ANGIOGRAM THROUGH CATHETER FOLLOW UP  12/21/2018     IR LOWER EXTREMITY ANGIOGRAM LEFT  12/19/2018     OPEN REDUCTION INTERNAL FIXATION PATELLA Left 12/21/2020    Procedure: Left partial patella fracture excision with advancement of the quadriceps tendon;  Surgeon: Stephen Rhodes MD;  Location:  OR     OPEN REDUCTION INTERNAL FIXATION RODDING INTRAMEDULLARY TIBIA Left 12/21/2020    Procedure: Open reduction intramedullary nailing of left tibia fracture;  Surgeon: Stephen Rhodes MD;  Location:  OR     VASCULAR SURGERY  04/30/2018    Left SFA stent in bypass graft     ZC NONSPECIFIC PROCEDURE  04/2000    Open Biopsy Left Thigh Liposarcoma     CURRENT MEDICATIONS:  Current Outpatient Medications   Medication Sig Dispense Refill     acetaminophen (TYLENOL) 325 MG tablet Take 2 tablets (650 mg) by mouth every 6 hours as needed for mild pain       alendronate (FOSAMAX) 70 MG tablet TAKE 1 TABLET BY MOUTH EVERY 7 DAYS W/8 OZ WATER 30 MIN BEFORE BREAKFAST/REMAIN UPRIGHT 12 tablet 3     aspirin (ASA) 81 MG chewable tablet Take 81 mg by mouth daily       calcium carbonate 600 mg-vitamin D 400 units (CALTRATE) 600-400 MG-UNIT per tablet Take 1 chew tab by mouth daily       clopidogrel (PLAVIX) 75 MG tablet Take 1 tablet (75 mg) by mouth daily 90 tablet 3     ezetimibe (ZETIA) 10 MG tablet Take 1 tablet (10 mg) by mouth daily 90 tablet 3     fesoterodine fumarate (TOVIAZ) 4 MG TB24  24 hr tablet Take 1 tablet (4 mg) by mouth daily 90 tablet 3     levothyroxine (SYNTHROID/LEVOTHROID) 50 MCG tablet TAKE 1 TABLET BY MOUTH EVERY DAY 90 tablet 3     multivitamin, therapeutic (THERA-VIT) TABS tablet Take 1 tablet by mouth daily       order for DME Equipment being ordered: Left Thigh High Compression Stocking, 550 CCL.3 1 each 99     ORDER FOR DME Equipment being ordered: lymphedema bandages 2 Device 1     oxyCODONE (ROXICODONE) 5 MG tablet Take 1 tablet (5 mg) by mouth every 4 hours as needed for moderate to severe pain 40 tablet 0     rosuvastatin (CRESTOR) 40 MG tablet Take 1 tablet (40 mg) by mouth daily 90 tablet 3     Vitamin D3 (CHOLECALCIFEROL) 25 mcg (1000 units) tablet Take 2 tablets (50 mcg) by mouth daily 60 tablet 0     ALLERGIES:  Allergies   Allergen Reactions     Celexa [Citalopram Hydrobromide]      Decreased libido     SOCIAL HISTORY:  Social History     Socioeconomic History     Marital status:      Spouse name: Chris     Number of children: 3     Years of education: 14     Highest education level: Associate degree: academic program   Occupational History     Occupation: at home     Employer: NONE    Social Needs     Financial resource strain: Not hard at all     Food insecurity     Worry: Never true     Inability: Never true     Transportation needs     Medical: No     Non-medical: No   Tobacco Use     Smoking status: Never Smoker     Smokeless tobacco: Never Used   Substance and Sexual Activity     Alcohol use: No     Frequency: Monthly or less     Drinks per session: 1 or 2     Binge frequency: Never     Drug use: No     Sexual activity: Yes     Partners: Male     Birth control/protection: Post-menopausal   Lifestyle     Physical activity     Days per week: 0 days     Minutes per session: 0 min     Stress: Not at all   Relationships     Social connections     Talks on phone: More than three times a week     Gets together: Once a week     Attends Yazdanism service: More  "than 4 times per year     Active member of club or organization: No     Attends meetings of clubs or organizations: Never     Relationship status:      Intimate partner violence     Fear of current or ex partner: Not on file     Emotionally abused: Not on file     Physically abused: Not on file     Forced sexual activity: Not on file   Other Topics Concern     Parent/sibling w/ CABG, MI or angioplasty before 65F 55M? No   Social History Narrative     Not on file     FAMILY HISTORY:  Family History   Problem Relation Age of Onset     C.A.D. Father         MI 57     Alcohol/Drug Father         etoh     Obesity Mother      Osteoporosis Mother      Colon Cancer Brother 70     Hyperlipidemia Son      Hyperlipidemia Son         very high, experimental drug     C.A.D. Paternal Grandmother         ascvd     Diabetes Maternal Grandmother      Cancer Maternal Grandmother      C.A.D. Paternal Uncle         Mi  age 48     Cancer Maternal Aunt         pancreatic CA     REVIEW OF SYSTEMS:  CONSTITUTIONAL:no malaise, fatigue, or other general symptoms  EYES: no subjective changes in visual acuity, no photophobia  ENT/MOUTH: no complaints of rhinorrhea, nasal congestion, sore throat, hearing changes  RESP:no SOB  CV: no c/o exertional chest pressure or SALDANA  GI: No abdominal pain, constipation, change in bowel movements, nausea, pyrosis, BRBPR  :no polyuria or polydipsia, no dysuria, no gross hematuria  MUSCULOSKELATAL: Underwent left lower extremity surgery and last week of December since then wearing the brace  INTEGUMENTARY/SKIN: no pruritis, rashes, or moles with recent change in size, shape, or pigmentation  NEURO: no gross sensory or motor symptoms, no dizziness, no confusion  ENDOCRINE: no polyuria or polydipsia, no heat or cold intolerance  HEME/ALLERGY/IMMUNE: no fevers, chills, night sweats, or unwanted weight loss  PSYCHIATRIC: no depression, anxiety, or internal stimuli  EXAM:  Ht 1.676 m (5' 6\")   Wt " 62.6 kg (138 lb)   LMP 03/18/2005   BMI 22.27 kg/m    BMI: Body mass index is 22.27 kg/m .  GENERAL APPEARANCE:  Pleasant  Healthy appearing male , alert, active, no distress cooperative.  EXAM:  No physical exam done this is video virtual visit      Reviewed recent labs and imaging studies in [facility].    A/P:    (I89.0) Lymphedema of left leg  (primary encounter diagnosis)  Comment: Continue to utilize wraps and elevate the leg and discussed with the orthopedic surgeon for full compression of leg for lymphedema  Plan:     (Z95.828)  PAD with History of arterial bypass of Left lower extremity  Comment: Continue baby aspirin and Plavix  Refill the medication  Check CBC  Her lipids are not well controlled despite switching from pravastatin to Crestor 20 mg daily will increase the Crestor dose to 40 mg daily new prescription sent  Plan: clopidogrel (PLAVIX) 75 MG tablet, CBC with         platelets differential            (C49.9) 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  Stable monitor for now    (E78.5) Hyperlipidemia LDL goal <70  Comment: Increase Crestor 40 mg daily and repeat fasting lipids, CMP, TSH in 3 months then followed by video visit, continue Zetia 10 mg daily  Plan: rosuvastatin (CRESTOR) 40 MG tablet, ezetimibe         (ZETIA) 10 MG tablet, Comprehensive metabolic         panel, Lipid panel reflex to direct LDL Fasting            (E03.9) Hypothyroidism, unspecified type  Comment: Clinically euthyroid check thyroid function tests before next visit continue levothyroxine same  Plan: TSH with free T4 reflex            Video Visit Details    Type of Service: Video Visit    Video Start Time:     Total  visit time spent more than 30 minutes , reviewed recent hospitalization records, laboratory tests and documentation etc.    Originating Location (patient location): Home   Distant Location (provider location): [facility]     Mode of Communication:  Video Conference via  [employer]      This visit is being conducted as a virtual visit due to the emphasis on mitigation of the COVID-19 virus pandemic. The clinician has decided that the risk of an in-office visit outweighs the benefit for this patient.     Tiesha Prince MD, JASON, [facility]  Vascular Medicine

## 2021-02-16 NOTE — PATIENT INSTRUCTIONS
1.Please increase crestor dose to 40 mg daily, new RX sent    2. Repeat fasting lipids, CBC, CMP , TSH  Before next video visit in 3 months.    3. Use compression stockings, wraps etc if ok with orthopedic doctor given recent leg and patella fracture     4.continue rest of the medications same.

## 2021-02-19 ENCOUNTER — THERAPY VISIT (OUTPATIENT)
Dept: PHYSICAL THERAPY | Facility: CLINIC | Age: 71
End: 2021-02-19
Payer: COMMERCIAL

## 2021-02-19 DIAGNOSIS — Z47.89 AFTERCARE FOLLOWING SURGERY OF THE MUSCULOSKELETAL SYSTEM: ICD-10-CM

## 2021-02-19 DIAGNOSIS — M25.562 ACUTE PAIN OF LEFT KNEE: ICD-10-CM

## 2021-02-19 PROCEDURE — 97140 MANUAL THERAPY 1/> REGIONS: CPT | Mod: GP | Performed by: PHYSICAL THERAPIST

## 2021-02-19 PROCEDURE — 97110 THERAPEUTIC EXERCISES: CPT | Mod: GP | Performed by: PHYSICAL THERAPIST

## 2021-02-23 ENCOUNTER — THERAPY VISIT (OUTPATIENT)
Dept: PHYSICAL THERAPY | Facility: CLINIC | Age: 71
End: 2021-02-23
Payer: COMMERCIAL

## 2021-02-23 DIAGNOSIS — Z47.89 AFTERCARE FOLLOWING SURGERY OF THE MUSCULOSKELETAL SYSTEM: ICD-10-CM

## 2021-02-23 DIAGNOSIS — M25.562 ACUTE PAIN OF LEFT KNEE: ICD-10-CM

## 2021-02-23 PROCEDURE — 97110 THERAPEUTIC EXERCISES: CPT | Mod: GP | Performed by: PHYSICAL THERAPIST

## 2021-03-02 ENCOUNTER — THERAPY VISIT (OUTPATIENT)
Dept: PHYSICAL THERAPY | Facility: CLINIC | Age: 71
End: 2021-03-02
Payer: COMMERCIAL

## 2021-03-02 DIAGNOSIS — Z47.89 AFTERCARE FOLLOWING SURGERY OF THE MUSCULOSKELETAL SYSTEM: ICD-10-CM

## 2021-03-02 DIAGNOSIS — M25.562 ACUTE PAIN OF LEFT KNEE: ICD-10-CM

## 2021-03-02 PROCEDURE — 97110 THERAPEUTIC EXERCISES: CPT | Mod: GP | Performed by: PHYSICAL THERAPIST

## 2021-03-04 ENCOUNTER — MYC MEDICAL ADVICE (OUTPATIENT)
Dept: SURGERY | Facility: CLINIC | Age: 71
End: 2021-03-04

## 2021-03-05 ENCOUNTER — THERAPY VISIT (OUTPATIENT)
Dept: PHYSICAL THERAPY | Facility: CLINIC | Age: 71
End: 2021-03-05
Payer: COMMERCIAL

## 2021-03-05 DIAGNOSIS — Z47.89 AFTERCARE FOLLOWING SURGERY OF THE MUSCULOSKELETAL SYSTEM: ICD-10-CM

## 2021-03-05 DIAGNOSIS — M25.562 ACUTE PAIN OF LEFT KNEE: ICD-10-CM

## 2021-03-05 PROCEDURE — 97110 THERAPEUTIC EXERCISES: CPT | Mod: GP | Performed by: PHYSICAL THERAPIST

## 2021-03-05 NOTE — TELEPHONE ENCOUNTER
Please see Shhmooze message below.    Vika LARA, RN    [facility]  Vascular [facility]  Office: [phone]  Fax: [phone]

## 2021-03-11 DIAGNOSIS — N32.81 OAB (OVERACTIVE BLADDER): Primary | ICD-10-CM

## 2021-03-11 RX ORDER — FESOTERODINE FUMARATE 8 MG/1
8 TABLET, FILM COATED, EXTENDED RELEASE ORAL DAILY
Qty: 90 TABLET | Refills: 1 | Status: SHIPPED | OUTPATIENT
Start: 2021-03-11 | End: 2021-06-02

## 2021-03-11 NOTE — PROGRESS NOTES
Chief Complaint   Patient presents with     Medication Request     festoterodine (TOVIAZ) 8 mg   patient was notified via my chart.      Houston Sena MA

## 2021-03-12 ENCOUNTER — THERAPY VISIT (OUTPATIENT)
Dept: PHYSICAL THERAPY | Facility: CLINIC | Age: 71
End: 2021-03-12
Payer: COMMERCIAL

## 2021-03-12 DIAGNOSIS — M25.562 ACUTE PAIN OF LEFT KNEE: ICD-10-CM

## 2021-03-12 DIAGNOSIS — Z47.89 AFTERCARE FOLLOWING SURGERY OF THE MUSCULOSKELETAL SYSTEM: ICD-10-CM

## 2021-03-12 PROCEDURE — 97110 THERAPEUTIC EXERCISES: CPT | Mod: GP | Performed by: PHYSICAL THERAPIST

## 2021-03-16 ENCOUNTER — THERAPY VISIT (OUTPATIENT)
Dept: PHYSICAL THERAPY | Facility: CLINIC | Age: 71
End: 2021-03-16
Payer: COMMERCIAL

## 2021-03-16 DIAGNOSIS — Z47.89 AFTERCARE FOLLOWING SURGERY OF THE MUSCULOSKELETAL SYSTEM: ICD-10-CM

## 2021-03-16 DIAGNOSIS — M25.562 ACUTE PAIN OF LEFT KNEE: ICD-10-CM

## 2021-03-16 PROCEDURE — 97110 THERAPEUTIC EXERCISES: CPT | Mod: GP | Performed by: PHYSICAL THERAPIST

## 2021-03-17 DIAGNOSIS — N39.0 RECURRENT UTI: ICD-10-CM

## 2021-03-17 RX ORDER — NITROFURANTOIN 25; 75 MG/1; MG/1
CAPSULE ORAL
Qty: 30 CAPSULE | Refills: 3 | Status: SHIPPED | OUTPATIENT
Start: 2021-03-17 | End: 2023-01-12

## 2021-03-17 NOTE — TELEPHONE ENCOUNTER
Routing refill request to provider for review/approval because:  Drug not on the [facility] refill protocol     Ange Proctor RN   [facility] -- Triage Nurse

## 2021-03-19 ENCOUNTER — THERAPY VISIT (OUTPATIENT)
Dept: PHYSICAL THERAPY | Facility: CLINIC | Age: 71
End: 2021-03-19
Payer: COMMERCIAL

## 2021-03-19 DIAGNOSIS — M25.562 ACUTE PAIN OF LEFT KNEE: ICD-10-CM

## 2021-03-19 DIAGNOSIS — Z47.89 AFTERCARE FOLLOWING SURGERY OF THE MUSCULOSKELETAL SYSTEM: ICD-10-CM

## 2021-03-19 PROCEDURE — 97110 THERAPEUTIC EXERCISES: CPT | Mod: GP | Performed by: PHYSICAL THERAPIST

## 2021-03-19 ASSESSMENT — ACTIVITIES OF DAILY LIVING (ADL)
RISE FROM A CHAIR: ACTIVITY IS SOMEWHAT DIFFICULT
GO DOWN STAIRS: ACTIVITY IS FAIRLY DIFFICULT
STAND: ACTIVITY IS MINIMALLY DIFFICULT
KNEEL ON THE FRONT OF YOUR KNEE: I AM UNABLE TO DO THE ACTIVITY
GIVING WAY, BUCKLING OR SHIFTING OF KNEE: I DO NOT HAVE THE SYMPTOM
SWELLING: THE SYMPTOM AFFECTS MY ACTIVITY SEVERELY
PAIN: THE SYMPTOM AFFECTS MY ACTIVITY SLIGHTLY
KNEE_ACTIVITY_OF_DAILY_LIVING_SCORE: 48.57
WEAKNESS: THE SYMPTOM AFFECTS MY ACTIVITY MODERATELY
HOW_WOULD_YOU_RATE_THE_CURRENT_FUNCTION_OF_YOUR_KNEE_DURING_YOUR_USUAL_DAILY_ACTIVITIES_ON_A_SCALE_FROM_0_TO_100_WITH_100_BEING_YOUR_LEVEL_OF_KNEE_FUNCTION_PRIOR_TO_YOUR_INJURY_AND_0_BEING_THE_INABILITY_TO_PERFORM_ANY_OF_YOUR_USUAL_DAILY_ACTIVITIES?: 50
STIFFNESS: THE SYMPTOM AFFECTS MY ACTIVITY MODERATELY
SIT WITH YOUR KNEE BENT: ACTIVITY IS SOMEWHAT DIFFICULT
WALK: ACTIVITY IS SOMEWHAT DIFFICULT
LIMPING: THE SYMPTOM AFFECTS MY ACTIVITY SLIGHTLY
KNEE_ACTIVITY_OF_DAILY_LIVING_SUM: 34
RAW_SCORE: 34
HOW_WOULD_YOU_RATE_THE_OVERALL_FUNCTION_OF_YOUR_KNEE_DURING_YOUR_USUAL_DAILY_ACTIVITIES?: SEVERELY ABNORMAL
GO UP STAIRS: ACTIVITY IS FAIRLY DIFFICULT
AS_A_RESULT_OF_YOUR_KNEE_INJURY,_HOW_WOULD_YOU_RATE_YOUR_CURRENT_LEVEL_OF_DAILY_ACTIVITY?: SEVERELY ABNORMAL
SQUAT: ACTIVITY IS VERY DIFFICULT

## 2021-03-19 NOTE — LETTER
[facility]  [address]  [facility] [address]  [phone]    2021    Re: Angeli Jacobson   :   1950  MRN:  [id number]   REFERRING PHYSICIAN:   Stephen TOMPKINS [facility]  Date of Initial Evaluation:  21  Visits:  Rxs Used: 19  Reason for Referral: Aftercare following surgery of the musculoskeletal system; Acute pain of left knee      PROGRESS  REPORT  Progress reporting period is from IE to 3/19/2021.       SUBJECTIVE  Subjective changes noted by patient: Subjective: Doing well, stiffness variable and dependent on level of lymph edema as well as muscle/joint tightness.  Walking with SEC all settings    Current pain level is  Current Pain level: (1-2/10).     Previous pain level was   Initial Pain level: 3/10.   Changes in function:  Yes (See Goal flowsheet attached for changes in current functional level)  Adverse reaction to treatment or activity: None    OBJECTIVE  Changes noted in objective findings:    Objective: ROM: 0-0-63. Independent SLR without lag, fair SAQ fatigues quickly     ASSESSMENT/PLAN  Updated problem list and treatment plan: Diagnosis 1:  S/P L tib ORIF with quad tendon advancement  Pain -  hot/cold therapy and home program  Decreased ROM/flexibility - manual therapy and therapeutic exercise  Decreased strength - therapeutic exercise and therapeutic activities  Decreased proprioception - neuro re-education and therapeutic activities  Edema - cold therapy and pt to restart lymph edema therapy soon  Impaired gait - gait training and assistive devices  Impaired muscle performance - neuro re-education  Decreased function - therapeutic activities  STG/LTGs have been met or progress has been made towards goals:  Yes (See Goal flow sheet completed today.)  Assessment of Progress: The patient's condition is improving.  The patient's condition has potential to  improve.  Self Management Plans:  Patient has been instructed in a home treatment program.  Patient  has been instructed in self management of symptoms.  I have re-evaluated this patient and find that the nature, scope, duration and intensity of the therapy is appropriate for the medical condition of the patient.  Angeli continues to require the following intervention to meet STG and LTG's:  PT    Recommendations:  This patient would benefit from further evaluation.    Thank you for your referral.    INQUIRIES  Therapist: Chaka Paulino [facility] SERVICES [address] [address]  Phone: [phone]  Fax: [phone]

## 2021-03-19 NOTE — PROGRESS NOTES
Subjective:  HPI  Physical Exam                    Objective:  System    Physical Exam    General     ROS    Assessment/Plan:    PROGRESS  REPORT    Progress reporting period is from IE to 3/19/2021.       SUBJECTIVE  Subjective changes noted by patient:  .  Subjective: Doing well, stiffness variable and dependent on level of lymph edema as well as muscle/joint tightness.  Walking with SEC all settings    Current pain level is  Current Pain level: (1-2/10).     Previous pain level was   Initial Pain level: 3/10.   Changes in function:  Yes (See Goal flowsheet attached for changes in current functional level)  Adverse reaction to treatment or activity: None    OBJECTIVE  Changes noted in objective findings:    Objective: ROM: 0-0-63. Independent SLR without lag, fair SAQ fatigues quickly     ASSESSMENT/PLAN  Updated problem list and treatment plan: Diagnosis 1:  S/P L tib ORIF with quad tendon advancement  Pain -  hot/cold therapy and home program  Decreased ROM/flexibility - manual therapy and therapeutic exercise  Decreased strength - therapeutic exercise and therapeutic activities  Decreased proprioception - neuro re-education and therapeutic activities  Edema - cold therapy and pt to restart lymph edema therapy soon  Impaired gait - gait training and assistive devices  Impaired muscle performance - neuro re-education  Decreased function - therapeutic activities  STG/LTGs have been met or progress has been made towards goals:  Yes (See Goal flow sheet completed today.)  Assessment of Progress: The patient's condition is improving.  The patient's condition has potential to improve.  Self Management Plans:  Patient has been instructed in a home treatment program.  Patient  has been instructed in self management of symptoms.  I have re-evaluated this patient and find that the nature, scope, duration and intensity of the therapy is appropriate for the medical condition of the patient.  Angeli continues to require  the following intervention to meet STG and LTG's:  PT    Recommendations:  This patient would benefit from further evaluation.    Please refer to the daily flowsheet for treatment today, total treatment time and time spent performing 1:1 timed codes.

## 2021-03-22 ENCOUNTER — TRANSFERRED RECORDS (OUTPATIENT)
Dept: HEALTH INFORMATION MANAGEMENT | Facility: CLINIC | Age: 71
End: 2021-03-22

## 2021-03-23 ENCOUNTER — THERAPY VISIT (OUTPATIENT)
Dept: PHYSICAL THERAPY | Facility: CLINIC | Age: 71
End: 2021-03-23
Payer: COMMERCIAL

## 2021-03-23 DIAGNOSIS — M25.562 ACUTE PAIN OF LEFT KNEE: ICD-10-CM

## 2021-03-23 DIAGNOSIS — Z47.89 AFTERCARE FOLLOWING SURGERY OF THE MUSCULOSKELETAL SYSTEM: ICD-10-CM

## 2021-03-23 PROCEDURE — 97112 NEUROMUSCULAR REEDUCATION: CPT | Mod: GP | Performed by: PHYSICAL THERAPIST

## 2021-03-23 PROCEDURE — 97110 THERAPEUTIC EXERCISES: CPT | Mod: GP | Performed by: PHYSICAL THERAPIST

## 2021-03-26 ENCOUNTER — THERAPY VISIT (OUTPATIENT)
Dept: PHYSICAL THERAPY | Facility: CLINIC | Age: 71
End: 2021-03-26
Payer: COMMERCIAL

## 2021-03-26 DIAGNOSIS — M25.562 ACUTE PAIN OF LEFT KNEE: ICD-10-CM

## 2021-03-26 DIAGNOSIS — Z47.89 AFTERCARE FOLLOWING SURGERY OF THE MUSCULOSKELETAL SYSTEM: ICD-10-CM

## 2021-03-26 PROCEDURE — 97110 THERAPEUTIC EXERCISES: CPT | Mod: GP | Performed by: PHYSICAL THERAPIST

## 2021-03-26 PROCEDURE — 97112 NEUROMUSCULAR REEDUCATION: CPT | Mod: GP | Performed by: PHYSICAL THERAPIST

## 2021-03-30 ENCOUNTER — THERAPY VISIT (OUTPATIENT)
Dept: PHYSICAL THERAPY | Facility: CLINIC | Age: 71
End: 2021-03-30
Payer: COMMERCIAL

## 2021-03-30 DIAGNOSIS — Z47.89 AFTERCARE FOLLOWING SURGERY OF THE MUSCULOSKELETAL SYSTEM: ICD-10-CM

## 2021-03-30 DIAGNOSIS — M25.562 ACUTE PAIN OF LEFT KNEE: ICD-10-CM

## 2021-03-30 PROCEDURE — 97110 THERAPEUTIC EXERCISES: CPT | Mod: GP | Performed by: PHYSICAL THERAPIST

## 2021-03-30 PROCEDURE — 97112 NEUROMUSCULAR REEDUCATION: CPT | Mod: GP | Performed by: PHYSICAL THERAPIST

## 2021-04-02 ENCOUNTER — THERAPY VISIT (OUTPATIENT)
Dept: PHYSICAL THERAPY | Facility: CLINIC | Age: 71
End: 2021-04-02
Payer: COMMERCIAL

## 2021-04-02 DIAGNOSIS — M25.562 ACUTE PAIN OF LEFT KNEE: ICD-10-CM

## 2021-04-02 DIAGNOSIS — Z47.89 AFTERCARE FOLLOWING SURGERY OF THE MUSCULOSKELETAL SYSTEM: ICD-10-CM

## 2021-04-02 PROCEDURE — 97110 THERAPEUTIC EXERCISES: CPT | Mod: GP | Performed by: PHYSICAL THERAPIST

## 2021-04-02 PROCEDURE — 97112 NEUROMUSCULAR REEDUCATION: CPT | Mod: GP | Performed by: PHYSICAL THERAPIST

## 2021-04-05 ENCOUNTER — MYC MEDICAL ADVICE (OUTPATIENT)
Dept: SURGERY | Facility: CLINIC | Age: 71
End: 2021-04-05

## 2021-04-06 ENCOUNTER — THERAPY VISIT (OUTPATIENT)
Dept: PHYSICAL THERAPY | Facility: CLINIC | Age: 71
End: 2021-04-06
Payer: COMMERCIAL

## 2021-04-06 DIAGNOSIS — M25.562 ACUTE PAIN OF LEFT KNEE: ICD-10-CM

## 2021-04-06 DIAGNOSIS — Z47.89 AFTERCARE FOLLOWING SURGERY OF THE MUSCULOSKELETAL SYSTEM: ICD-10-CM

## 2021-04-06 PROCEDURE — 97112 NEUROMUSCULAR REEDUCATION: CPT | Mod: GP | Performed by: PHYSICAL THERAPIST

## 2021-04-06 PROCEDURE — 97110 THERAPEUTIC EXERCISES: CPT | Mod: GP | Performed by: PHYSICAL THERAPIST

## 2021-04-09 ENCOUNTER — THERAPY VISIT (OUTPATIENT)
Dept: PHYSICAL THERAPY | Facility: CLINIC | Age: 71
End: 2021-04-09
Payer: COMMERCIAL

## 2021-04-09 DIAGNOSIS — M25.562 ACUTE PAIN OF LEFT KNEE: ICD-10-CM

## 2021-04-09 DIAGNOSIS — Z47.89 AFTERCARE FOLLOWING SURGERY OF THE MUSCULOSKELETAL SYSTEM: ICD-10-CM

## 2021-04-09 PROCEDURE — 97112 NEUROMUSCULAR REEDUCATION: CPT | Mod: GP | Performed by: PHYSICAL THERAPIST

## 2021-04-09 PROCEDURE — 97110 THERAPEUTIC EXERCISES: CPT | Mod: GP | Performed by: PHYSICAL THERAPIST

## 2021-04-13 ENCOUNTER — THERAPY VISIT (OUTPATIENT)
Dept: PHYSICAL THERAPY | Facility: CLINIC | Age: 71
End: 2021-04-13
Payer: COMMERCIAL

## 2021-04-13 DIAGNOSIS — Z47.89 AFTERCARE FOLLOWING SURGERY OF THE MUSCULOSKELETAL SYSTEM: ICD-10-CM

## 2021-04-13 DIAGNOSIS — M25.562 ACUTE PAIN OF LEFT KNEE: ICD-10-CM

## 2021-04-13 PROCEDURE — 97110 THERAPEUTIC EXERCISES: CPT | Mod: GP | Performed by: PHYSICAL THERAPIST

## 2021-04-13 PROCEDURE — 97112 NEUROMUSCULAR REEDUCATION: CPT | Mod: GP | Performed by: PHYSICAL THERAPIST

## 2021-04-16 ENCOUNTER — THERAPY VISIT (OUTPATIENT)
Dept: PHYSICAL THERAPY | Facility: CLINIC | Age: 71
End: 2021-04-16
Payer: COMMERCIAL

## 2021-04-16 DIAGNOSIS — Z47.89 AFTERCARE FOLLOWING SURGERY OF THE MUSCULOSKELETAL SYSTEM: ICD-10-CM

## 2021-04-16 DIAGNOSIS — M25.562 ACUTE PAIN OF LEFT KNEE: ICD-10-CM

## 2021-04-16 PROCEDURE — 97110 THERAPEUTIC EXERCISES: CPT | Mod: GP | Performed by: PHYSICAL THERAPIST

## 2021-04-16 PROCEDURE — 97112 NEUROMUSCULAR REEDUCATION: CPT | Mod: GP | Performed by: PHYSICAL THERAPIST

## 2021-04-20 ENCOUNTER — THERAPY VISIT (OUTPATIENT)
Dept: PHYSICAL THERAPY | Facility: CLINIC | Age: 71
End: 2021-04-20
Payer: COMMERCIAL

## 2021-04-20 DIAGNOSIS — Z47.89 AFTERCARE FOLLOWING SURGERY OF THE MUSCULOSKELETAL SYSTEM: ICD-10-CM

## 2021-04-20 DIAGNOSIS — M25.562 ACUTE PAIN OF LEFT KNEE: ICD-10-CM

## 2021-04-20 PROCEDURE — 97110 THERAPEUTIC EXERCISES: CPT | Mod: GP | Performed by: PHYSICAL THERAPIST

## 2021-04-20 PROCEDURE — 97112 NEUROMUSCULAR REEDUCATION: CPT | Mod: GP | Performed by: PHYSICAL THERAPIST

## 2021-04-23 ENCOUNTER — THERAPY VISIT (OUTPATIENT)
Dept: PHYSICAL THERAPY | Facility: CLINIC | Age: 71
End: 2021-04-23
Payer: COMMERCIAL

## 2021-04-23 DIAGNOSIS — Z47.89 AFTERCARE FOLLOWING SURGERY OF THE MUSCULOSKELETAL SYSTEM: ICD-10-CM

## 2021-04-23 DIAGNOSIS — M25.562 ACUTE PAIN OF LEFT KNEE: ICD-10-CM

## 2021-04-23 PROCEDURE — 97110 THERAPEUTIC EXERCISES: CPT | Mod: GP | Performed by: PHYSICAL THERAPIST

## 2021-04-23 PROCEDURE — 97112 NEUROMUSCULAR REEDUCATION: CPT | Mod: GP | Performed by: PHYSICAL THERAPIST

## 2021-04-30 ENCOUNTER — THERAPY VISIT (OUTPATIENT)
Dept: PHYSICAL THERAPY | Facility: CLINIC | Age: 71
End: 2021-04-30
Payer: COMMERCIAL

## 2021-04-30 DIAGNOSIS — Z47.89 AFTERCARE FOLLOWING SURGERY OF THE MUSCULOSKELETAL SYSTEM: ICD-10-CM

## 2021-04-30 DIAGNOSIS — M25.562 ACUTE PAIN OF LEFT KNEE: ICD-10-CM

## 2021-04-30 PROCEDURE — 97110 THERAPEUTIC EXERCISES: CPT | Mod: GP | Performed by: PHYSICAL THERAPIST

## 2021-04-30 PROCEDURE — 97112 NEUROMUSCULAR REEDUCATION: CPT | Mod: GP | Performed by: PHYSICAL THERAPIST

## 2021-05-03 ENCOUNTER — TRANSFERRED RECORDS (OUTPATIENT)
Dept: HEALTH INFORMATION MANAGEMENT | Facility: CLINIC | Age: 71
End: 2021-05-03

## 2021-05-07 ENCOUNTER — THERAPY VISIT (OUTPATIENT)
Dept: PHYSICAL THERAPY | Facility: CLINIC | Age: 71
End: 2021-05-07
Payer: COMMERCIAL

## 2021-05-07 DIAGNOSIS — Z47.89 AFTERCARE FOLLOWING SURGERY OF THE MUSCULOSKELETAL SYSTEM: ICD-10-CM

## 2021-05-07 DIAGNOSIS — M25.562 ACUTE PAIN OF LEFT KNEE: ICD-10-CM

## 2021-05-07 PROCEDURE — 97112 NEUROMUSCULAR REEDUCATION: CPT | Mod: GP | Performed by: PHYSICAL THERAPIST

## 2021-05-07 PROCEDURE — 97110 THERAPEUTIC EXERCISES: CPT | Mod: GP | Performed by: PHYSICAL THERAPIST
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
## 2021-05-10 ENCOUNTER — HOSPITAL ENCOUNTER (OUTPATIENT)
Dept: OCCUPATIONAL THERAPY | Facility: CLINIC | Age: 71
Setting detail: THERAPIES SERIES
End: 2021-05-10
Attending: ORTHOPAEDIC SURGERY
Payer: COMMERCIAL

## 2021-05-10 ENCOUNTER — MYC MEDICAL ADVICE (OUTPATIENT)
Dept: FAMILY MEDICINE | Facility: CLINIC | Age: 71
End: 2021-05-10

## 2021-05-10 PROCEDURE — 97140 MANUAL THERAPY 1/> REGIONS: CPT | Mod: GO | Performed by: OCCUPATIONAL THERAPIST

## 2021-05-10 NOTE — PROGRESS NOTES
[facility]      OUTPATIENT OCCUPATIONAL THERAPY  PLAN OF TREATMENT FOR OUTPATIENT REHABILITATION    Patient's Last Name, First Name, M.I.                YOB: 1950  Angeli Jacobson                        Provider's Name  [facility] Medical Record No.  [id number]                               Onset Date: 9/17/18   Start of Care Date: 9/17/20   Type:     ___PT   _X_OT   ___SLP Medical Diagnosis: Lymphedema                       OT Diagnosis: Lymphedema      _________________________________________________________________________________  Plan of Treatment:    Frequency/Duration: 0x/week for 8 weeks, then 5x/week for 3 weeks and 0x/week for 3 weeks and once a week for one week.     Goals:  Goal Identifier 1   Goal Description Pt will be independent with modifications to home program.   Target Date 06/25/21   Date Met      Progress:goal reinstated due to fx flare     Goal Identifier 2   Goal Description Pt will decrease fullness and the tightness in the leg -.8L to climb the stairs easier.   Target Date 06/25/21   Date Met      Progress:goal revised due to fx flare     Goal Identifier 3   Goal Description Pt will get into/out of car with greater ease with less knee fluid.   Target Date 06/25/21   Date Met      Progress:goal reinstated from fx flare       Progress Toward Goals:   Progress this reporting period: Pt was seen for 15 sessions of intensive, was set for home program and fx tibia/patella.  She returns after fx and surgery at her baseline L>R 22% and will repeat the intensive therapy.      Certification date from 3/2/21 to 5/25/21.    Amy Guzman OT          I CERTIFY THE NEED FOR THESE SERVICES FURNISHED UNDER        THIS PLAN OF TREATMENT AND WHILE UNDER MY CARE     (Physician co-signature of this document indicates review and certification of the therapy plan).                 Referring Provider: Sepideh Burris PA-C

## 2021-05-10 NOTE — TELEPHONE ENCOUNTER
Answered in bisi SARAVIA RN, BSN, PAL (Patient Advocate Liaison)  [facility]   [phone]

## 2021-05-11 ENCOUNTER — OFFICE VISIT (OUTPATIENT)
Dept: FAMILY MEDICINE | Facility: CLINIC | Age: 71
End: 2021-05-11
Payer: COMMERCIAL

## 2021-05-11 ENCOUNTER — HOSPITAL ENCOUNTER (OUTPATIENT)
Dept: OCCUPATIONAL THERAPY | Facility: CLINIC | Age: 71
Setting detail: THERAPIES SERIES
End: 2021-05-11
Attending: ORTHOPAEDIC SURGERY
Payer: COMMERCIAL

## 2021-05-11 VITALS
RESPIRATION RATE: 12 BRPM | TEMPERATURE: 97.7 F | HEART RATE: 77 BPM | SYSTOLIC BLOOD PRESSURE: 120 MMHG | BODY MASS INDEX: 23.73 KG/M2 | DIASTOLIC BLOOD PRESSURE: 72 MMHG | WEIGHT: 147 LBS | OXYGEN SATURATION: 97 %

## 2021-05-11 DIAGNOSIS — H65.93 FLUID LEVEL BEHIND TYMPANIC MEMBRANE OF BOTH EARS: Primary | ICD-10-CM

## 2021-05-11 PROCEDURE — 97140 MANUAL THERAPY 1/> REGIONS: CPT | Mod: GO | Performed by: OCCUPATIONAL THERAPIST

## 2021-05-11 PROCEDURE — 99213 OFFICE O/P EST LOW 20 MIN: CPT | Performed by: NURSE PRACTITIONER

## 2021-05-11 RX ORDER — FLUTICASONE PROPIONATE 50 MCG
1 SPRAY, SUSPENSION (ML) NASAL DAILY
Qty: 16 G | Refills: 0 | Status: SHIPPED | OUTPATIENT
Start: 2021-05-11 | End: 2021-06-30

## 2021-05-11 ASSESSMENT — ANXIETY QUESTIONNAIRES
2. NOT BEING ABLE TO STOP OR CONTROL WORRYING: NOT AT ALL
1. FEELING NERVOUS, ANXIOUS, OR ON EDGE: NOT AT ALL
GAD7 TOTAL SCORE: 0
7. FEELING AFRAID AS IF SOMETHING AWFUL MIGHT HAPPEN: NOT AT ALL
GAD7 TOTAL SCORE: 0
7. FEELING AFRAID AS IF SOMETHING AWFUL MIGHT HAPPEN: NOT AT ALL
3. WORRYING TOO MUCH ABOUT DIFFERENT THINGS: NOT AT ALL
4. TROUBLE RELAXING: NOT AT ALL
GAD7 TOTAL SCORE: 0
5. BEING SO RESTLESS THAT IT IS HARD TO SIT STILL: NOT AT ALL
6. BECOMING EASILY ANNOYED OR IRRITABLE: NOT AT ALL

## 2021-05-11 ASSESSMENT — PATIENT HEALTH QUESTIONNAIRE - PHQ9
10. IF YOU CHECKED OFF ANY PROBLEMS, HOW DIFFICULT HAVE THESE PROBLEMS MADE IT FOR YOU TO DO YOUR WORK, TAKE CARE OF THINGS AT HOME, OR GET ALONG WITH OTHER PEOPLE: NOT DIFFICULT AT ALL
SUM OF ALL RESPONSES TO PHQ QUESTIONS 1-9: 2
SUM OF ALL RESPONSES TO PHQ QUESTIONS 1-9: 2

## 2021-05-11 NOTE — PROGRESS NOTES
"Pre-Visit Planning   Next 5 appointments (look out 90 days)    May 11, 2021  3:00 PM  (Arrive by 2:50 PM)  Office Visit with ELVA Savage CNP  [facility] ([facility] ) [address] [address]  [phone]   May 25, 2021 10:35 AM  (Arrive by 10:15 AM)  Office Visit with Sepideh Burris PA-C  [facility] ([facility] ) [address] [address]  [phone]   Jul 21, 2021 11:30 AM  Return Visit with Jennifer Oquendo MD  [facility] ([facility] [place] ) [address]  [address]  [facility] [id number] [phone]        Appointment Notes for this encounter:   rt ear pain and fullness     Questionnaires Reviewed/Assigned  Additional questionnaires assigned PHQ-9, CYNDI-7  {SCRIPTING IF PT ANSWERS \"Hi, my name is YARELY Yadav RN and I am calling on behalf of your provider's office at [facility].  I am calling to confirm and prep your upcoming appointment on 5/11/2021.  I see that you are coming in for Rt ear pain/fullness. Are there any additional questions or concerns you'd like to review with your provider during your visit?\" No     Patient preferred phone number: [phone]      Spoke to patient via phone. Are there any additional questions or concerns you'd like to review with your provider during your visit?            Visit is not preventive.    Meds  Is there anything on your medication list that needs to be updated? No    Current Outpatient Medications   Medication     acetaminophen (TYLENOL) 325 MG tablet     alendronate (FOSAMAX) 70 MG tablet     aspirin (ASA) 81 MG chewable tablet     calcium carbonate 600 mg-vitamin D 400 units (CALTRATE) 600-400 MG-UNIT per tablet     clopidogrel (PLAVIX) 75 MG tablet     ezetimibe (ZETIA) 10 MG tablet     fesoterodine " fumarate (TOVIAZ) 4 MG TB24 24 hr tablet     Fesoterodine Fumarate (TOVIAZ) 8 MG TB24     levothyroxine (SYNTHROID/LEVOTHROID) 50 MCG tablet     multivitamin, therapeutic (THERA-VIT) TABS tablet     nitroFURantoin macrocrystal-monohydrate (MACROBID) 100 MG capsule     order for DME     ORDER FOR DME     oxyCODONE (ROXICODONE) 5 MG tablet     rosuvastatin (CRESTOR) 40 MG tablet     Vitamin D3 (CHOLECALCIFEROL) 25 mcg (1000 units) tablet     No current facility-administered medications for this visit.      Which pharmacy do you prefer to use for medications during this visit if needed? [facility] [place], [place] - [address] [facility] [address]     Do you

## 2021-05-11 NOTE — PROGRESS NOTES
Assessment & Plan     Fluid level behind tympanic membrane of both ears  Discussed MIKE etiology. Will plan for flonase. Discussed medication use, risks, benefits, and side effects.   Continue claritin  Follow-up as needed.   - fluticasone (FLONASE) 50 MCG/ACT nasal spray  Dispense: 16 g; Refill: 0                   Return in about 4 months (around 9/11/2021) for Preventive Visit.    ELVA Savage CNP  [facility] BRITT Suh is a 71 year old who presents for the following health issues     HPI     Concern - Ear problem    Onset: 2 weeks  Description: Rt ear pain, in the beginning there was drainage, pressure, fullness feeling  Intensity: moderate  Progression of Symptoms:  Stable.   Accompanying Signs & Symptoms: see above    Therapies tried and outcome: Tanika     Does wear hearing aids regularly. Does not feel hearing has changes.   Claritin may have helped some.   Drainage at onset, looked like water the AM of onset only.     Review of Systems   Constitutional, HEENT, cardiovascular, pulmonary, gi and gu systems are negative, except as otherwise noted.      Objective    /72 (BP Location: Right arm, Patient Position: Chair, Cuff Size: Adult Regular)   Pulse 77   Temp 97.7  F (36.5  C) (Oral)   Resp 12   Wt 66.7 kg (147 lb)   LMP 03/18/2005   SpO2 97%   BMI 23.73 kg/m    Body mass index is 23.73 kg/m .  Physical Exam   GENERAL: healthy, alert and no distress  EYES: Eyes grossly normal to inspection, PERRL and conjunctivae and sclerae normal  HENT: normal cephalic/atraumatic, both ears: clear effusion, nose and mouth without ulcers or lesions, oropharynx clear and oral mucous membranes moist  NECK: no adenopathy, no asymmetry, masses, or scars and thyroid normal to palpation  RESP: regular rate and effort

## 2021-05-12 ENCOUNTER — MYC MEDICAL ADVICE (OUTPATIENT)
Dept: FAMILY MEDICINE | Facility: CLINIC | Age: 71
End: 2021-05-12

## 2021-05-12 ENCOUNTER — HOSPITAL ENCOUNTER (OUTPATIENT)
Dept: OCCUPATIONAL THERAPY | Facility: CLINIC | Age: 71
Setting detail: THERAPIES SERIES
End: 2021-05-12
Attending: ORTHOPAEDIC SURGERY
Payer: COMMERCIAL

## 2021-05-12 PROCEDURE — 97140 MANUAL THERAPY 1/> REGIONS: CPT | Mod: GO | Performed by: OCCUPATIONAL THERAPIST

## 2021-05-12 ASSESSMENT — ANXIETY QUESTIONNAIRES: GAD7 TOTAL SCORE: 0

## 2021-05-12 ASSESSMENT — PATIENT HEALTH QUESTIONNAIRE - PHQ9: SUM OF ALL RESPONSES TO PHQ QUESTIONS 1-9: 2

## 2021-05-12 NOTE — PROGRESS NOTES
Outpatient Occupational Therapy Progress Note     Patient: Angeli Jacobson  : 1950    Beginning/End Dates of Reporting Period:  20 to 2021    Referring Provider: Sepideh Burris PA-C    Therapy Diagnosis: Lymphedema    Client Self Report:   Pt reports flare of lymphedema since completion of intensive therapy in Dec.  She fell and broke the patella/tibia.  It was surgically repaired.      Objective Measurements:    Pt started this therapy at 22%.  It got to her smallest msmt of 5% L>R.  She then fx the leg.  Her msmts are back to baseline of 22%.  She has been wrapping the leg at night and compression sock day.    Outcome Measures (most recent score):   at d/c    Goals:   Goal Identifier 1   Goal Description Pt will be independent with modifications to home program.   Target Date 21   Date Met      Progress:Just reinstated due to flare from fx.     Goal Identifier 2   Goal Description Pt will decrease fullness and the tightness in the leg -.8L to sit in chair with better knee bend and get closer to the table.   Target Date 21   Date Met      Progress:reset this goal as she returns for intensive therapy     Goal Identifier 3   Goal Description Pt will get into/out of car with greater ease with less knee fluid.   Target Date 21   Date Met      Progress:goal in progress       Progress Toward Goals:   Progress this reporting period: Pt has been seen for 20 sessions since SOC.   She was seen for intensive therapy for 15 sessions.  Pt reports flare of lymphedema since completion of intensive therapy in Dec.  She fell and broke the patella/tibia.  It was surgically repaired.  She has been in PT.  Swelling is now holding back her progress.  She has updated her goals and returns at L>R22%.  She started her intensive therapy on Monday for 5x/week for 3 weeks.      Plan:  Changes to therapy plan of care: 5x/week for 3 weeks, then 0x/week for 3 weeks and once a week for one week.       Discharge:  No

## 2021-05-13 ENCOUNTER — HOSPITAL ENCOUNTER (OUTPATIENT)
Dept: OCCUPATIONAL THERAPY | Facility: CLINIC | Age: 71
Setting detail: THERAPIES SERIES
End: 2021-05-13
Attending: ORTHOPAEDIC SURGERY
Payer: COMMERCIAL

## 2021-05-13 PROCEDURE — 97140 MANUAL THERAPY 1/> REGIONS: CPT | Mod: GO | Performed by: OCCUPATIONAL THERAPIST

## 2021-05-14 ENCOUNTER — HOSPITAL ENCOUNTER (OUTPATIENT)
Dept: OCCUPATIONAL THERAPY | Facility: CLINIC | Age: 71
Setting detail: THERAPIES SERIES
End: 2021-05-14
Attending: ORTHOPAEDIC SURGERY
Payer: COMMERCIAL

## 2021-05-14 PROCEDURE — 97140 MANUAL THERAPY 1/> REGIONS: CPT | Mod: GO | Performed by: OCCUPATIONAL THERAPIST

## 2021-05-17 ENCOUNTER — HOSPITAL ENCOUNTER (OUTPATIENT)
Dept: OCCUPATIONAL THERAPY | Facility: CLINIC | Age: 71
Setting detail: THERAPIES SERIES
End: 2021-05-17
Attending: ORTHOPAEDIC SURGERY
Payer: COMMERCIAL

## 2021-05-17 DIAGNOSIS — Z95.828 HISTORY OF ARTERIAL BYPASS OF LOWER EXTREMITY: ICD-10-CM

## 2021-05-17 DIAGNOSIS — E78.5 HYPERLIPIDEMIA LDL GOAL <70: ICD-10-CM

## 2021-05-17 DIAGNOSIS — E03.9 HYPOTHYROIDISM, UNSPECIFIED TYPE: ICD-10-CM

## 2021-05-17 LAB
ALBUMIN SERPL-MCNC: 3.6 G/DL (ref 3.4–5)
ALP SERPL-CCNC: 88 U/L (ref 40–150)
ALT SERPL W P-5'-P-CCNC: 26 U/L (ref 0–50)
ANION GAP SERPL CALCULATED.3IONS-SCNC: 3 MMOL/L (ref 3–14)
AST SERPL W P-5'-P-CCNC: 20 U/L (ref 0–45)
BASOPHILS # BLD AUTO: 0 10E9/L (ref 0–0.2)
BASOPHILS NFR BLD AUTO: 0.7 %
BILIRUB SERPL-MCNC: 0.4 MG/DL (ref 0.2–1.3)
BUN SERPL-MCNC: 13 MG/DL (ref 7–30)
CALCIUM SERPL-MCNC: 9.3 MG/DL (ref 8.5–10.1)
CHLORIDE SERPL-SCNC: 107 MMOL/L (ref 94–109)
CHOLEST SERPL-MCNC: 196 MG/DL
CO2 SERPL-SCNC: 32 MMOL/L (ref 20–32)
CREAT SERPL-MCNC: 0.75 MG/DL (ref 0.52–1.04)
DIFFERENTIAL METHOD BLD: ABNORMAL
EOSINOPHIL # BLD AUTO: 0.2 10E9/L (ref 0–0.7)
EOSINOPHIL NFR BLD AUTO: 2.7 %
ERYTHROCYTE [DISTWIDTH] IN BLOOD BY AUTOMATED COUNT: 15.2 % (ref 10–15)
GFR SERPL CREATININE-BSD FRML MDRD: 81 ML/MIN/{1.73_M2}
GLUCOSE SERPL-MCNC: 78 MG/DL (ref 70–99)
HCT VFR BLD AUTO: 41.7 % (ref 35–47)
HDLC SERPL-MCNC: 56 MG/DL
HGB BLD-MCNC: 13.2 G/DL (ref 11.7–15.7)
LDLC SERPL CALC-MCNC: 124 MG/DL
LYMPHOCYTES # BLD AUTO: 1.8 10E9/L (ref 0.8–5.3)
LYMPHOCYTES NFR BLD AUTO: 32 %
MCH RBC QN AUTO: 28.6 PG (ref 26.5–33)
MCHC RBC AUTO-ENTMCNC: 31.7 G/DL (ref 31.5–36.5)
MCV RBC AUTO: 91 FL (ref 78–100)
MONOCYTES # BLD AUTO: 0.4 10E9/L (ref 0–1.3)
MONOCYTES NFR BLD AUTO: 7.3 %
NEUTROPHILS # BLD AUTO: 3.2 10E9/L (ref 1.6–8.3)
NEUTROPHILS NFR BLD AUTO: 57.3 %
NONHDLC SERPL-MCNC: 139 MG/DL
PLATELET # BLD AUTO: 245 10E9/L (ref 150–450)
POTASSIUM SERPL-SCNC: 4.3 MMOL/L (ref 3.4–5.3)
PROT SERPL-MCNC: 7.6 G/DL (ref 6.8–8.8)
RBC # BLD AUTO: 4.61 10E12/L (ref 3.8–5.2)
SODIUM SERPL-SCNC: 142 MMOL/L (ref 133–144)
T4 FREE SERPL-MCNC: 0.92 NG/DL (ref 0.76–1.46)
TRIGL SERPL-MCNC: 76 MG/DL
TSH SERPL DL<=0.005 MIU/L-ACNC: 4.19 MU/L (ref 0.4–4)
WBC # BLD AUTO: 5.6 10E9/L (ref 4–11)

## 2021-05-17 PROCEDURE — 85025 COMPLETE CBC W/AUTO DIFF WBC: CPT | Performed by: INTERNAL MEDICINE

## 2021-05-17 PROCEDURE — 36415 COLL VENOUS BLD VENIPUNCTURE: CPT | Performed by: INTERNAL MEDICINE

## 2021-05-17 PROCEDURE — 80061 LIPID PANEL: CPT | Performed by: INTERNAL MEDICINE

## 2021-05-17 PROCEDURE — 97140 MANUAL THERAPY 1/> REGIONS: CPT | Mod: GO | Performed by: OCCUPATIONAL THERAPIST

## 2021-05-17 PROCEDURE — 80053 COMPREHEN METABOLIC PANEL: CPT | Performed by: INTERNAL MEDICINE

## 2021-05-17 PROCEDURE — 84443 ASSAY THYROID STIM HORMONE: CPT | Performed by: INTERNAL MEDICINE

## 2021-05-17 PROCEDURE — 84439 ASSAY OF FREE THYROXINE: CPT | Performed by: INTERNAL MEDICINE

## 2021-05-18 ENCOUNTER — HOSPITAL ENCOUNTER (OUTPATIENT)
Dept: OCCUPATIONAL THERAPY | Facility: CLINIC | Age: 71
Setting detail: THERAPIES SERIES
End: 2021-05-18
Attending: ORTHOPAEDIC SURGERY
Payer: COMMERCIAL

## 2021-05-18 PROCEDURE — 97140 MANUAL THERAPY 1/> REGIONS: CPT | Mod: GO | Performed by: OCCUPATIONAL THERAPIST

## 2021-05-19 ENCOUNTER — HOSPITAL ENCOUNTER (OUTPATIENT)
Dept: OCCUPATIONAL THERAPY | Facility: CLINIC | Age: 71
Setting detail: THERAPIES SERIES
End: 2021-05-19
Attending: ORTHOPAEDIC SURGERY
Payer: COMMERCIAL

## 2021-05-19 PROCEDURE — 97140 MANUAL THERAPY 1/> REGIONS: CPT | Mod: GO | Performed by: OCCUPATIONAL THERAPIST

## 2021-05-20 ENCOUNTER — HOSPITAL ENCOUNTER (OUTPATIENT)
Dept: OCCUPATIONAL THERAPY | Facility: CLINIC | Age: 71
Setting detail: THERAPIES SERIES
End: 2021-05-20
Attending: ORTHOPAEDIC SURGERY
Payer: COMMERCIAL

## 2021-05-20 PROCEDURE — 97140 MANUAL THERAPY 1/> REGIONS: CPT | Mod: GO | Performed by: OCCUPATIONAL THERAPIST

## 2021-05-21 ENCOUNTER — HOSPITAL ENCOUNTER (OUTPATIENT)
Dept: OCCUPATIONAL THERAPY | Facility: CLINIC | Age: 71
Setting detail: THERAPIES SERIES
End: 2021-05-21
Attending: ORTHOPAEDIC SURGERY
Payer: COMMERCIAL

## 2021-05-21 PROCEDURE — 97140 MANUAL THERAPY 1/> REGIONS: CPT | Mod: GO | Performed by: OCCUPATIONAL THERAPIST

## 2021-05-24 ENCOUNTER — HOSPITAL ENCOUNTER (OUTPATIENT)
Dept: OCCUPATIONAL THERAPY | Facility: CLINIC | Age: 71
Setting detail: THERAPIES SERIES
End: 2021-05-24
Attending: ORTHOPAEDIC SURGERY
Payer: COMMERCIAL

## 2021-05-24 ENCOUNTER — VIRTUAL VISIT (OUTPATIENT)
Dept: OTHER | Facility: CLINIC | Age: 71
End: 2021-05-24
Attending: INTERNAL MEDICINE
Payer: COMMERCIAL

## 2021-05-24 DIAGNOSIS — E03.9 HYPOTHYROIDISM, UNSPECIFIED TYPE: ICD-10-CM

## 2021-05-24 DIAGNOSIS — C49.9 MYXOID LIPOSARCOMA (H): ICD-10-CM

## 2021-05-24 DIAGNOSIS — E78.5 HYPERLIPIDEMIA LDL GOAL <70: ICD-10-CM

## 2021-05-24 DIAGNOSIS — I89.0 LYMPHEDEMA OF LEFT LEG: Primary | ICD-10-CM

## 2021-05-24 DIAGNOSIS — Z95.828 HISTORY OF ARTERIAL BYPASS OF LOWER EXTREMITY: ICD-10-CM

## 2021-05-24 PROCEDURE — 97140 MANUAL THERAPY 1/> REGIONS: CPT | Mod: GO | Performed by: OCCUPATIONAL THERAPIST

## 2021-05-24 PROCEDURE — 99214 OFFICE O/P EST MOD 30 MIN: CPT | Mod: 95 | Performed by: INTERNAL MEDICINE

## 2021-05-24 RX ORDER — LEVOTHYROXINE SODIUM 75 UG/1
75 TABLET ORAL DAILY
Qty: 30 TABLET | Refills: 11 | Status: SHIPPED | OUTPATIENT
Start: 2021-05-24 | End: 2022-03-11

## 2021-05-24 NOTE — PROGRESS NOTES
Angeli is a 71 year old who is being evaluated via a billable video visit.      How would you like to obtain your AVS? MyChart  If the video visit is dropped, the invitation should be resent by: Text to cell phone: [phone]  Will anyone else be joining your video visit? No      Provider visit note:    Chief complaint:    Follow up visit   Review of recent labs, imaging studies, multiple issues     History of present illness:  Angeli Jacobson is a 71 year old female with past medical history of peripheral arterial disease with multiple interventions, hypothyroidism, hyperlipidemia admitted to hospital on December 19, 2020 after a fall and found Left midshaft tibiofibular fracture a nd Right comminuted patellar fracture  Status post open reduction intramedullary nailing of left tibia fracture and left patella partial patellar fracture excision with advancement of quadriceps tendon on December 20, 2020 and discharged home 2 days later  During last visit discontinued pravastatin and initiated Crestor 20 mg daily tolerating without any problems       Reviewed recent labs and hospitalization records in the [facility]  lipids improved  but still LDL not at goal , mildly elevated TSH but free T4 normal   Rest of the labs good range       Review of systems: Reviewed all 12 point review of systems as per HPI otherwise unremarkable    Physical exam:( no physical exam done this is virtual visit)    Reviewed recent laboratory tests, imaging studies in the epic and updated chart    Assessment and plan:    (I89.0) Lymphedema of left leg  (primary encounter diagnosis)  Comment: Continue to lymphedema therapist recommendations,elevate leg, compression etc.     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  Stable monitor for  now     (E78.5) Hyperlipidemia LDL goal <70  Comment: Continue Crestor 40 mg daily and  continue Zetia 10 mg daily  Repeat , Comprehensive metabolic         panel, Lipid panel reflex to direct LDL Fasting  In 3-4 months             (E03.9) Hypothyroidism, unspecified type  Comment: Clinically euthyroid slightly elevated TSH but Free T4 normal  continue levothyroxine same dose recheck labs in 3-4 months   Plan: TSH with free T4 reflex           Video Visit Details    Type of Service: Video Visit    Video Start Time: 10:20 AM    Total visit time spent more than 30 minutes on the day of the service reviewing chart, labs, imaging studies, previous eval and documentation etc.    Originating Location (patient location): Home     Distant Location (provider location) provider residence equipped with epic, dragon, [employer] etc.    Mode of Communication:  Video Conference via [employer]      This visit is being conducted as a virtual visit due to the emphasis on mitigation of the COVID-19 virus pandemic. The clinician has decided that the risk of an in-office visit outweighs the benefit for this patient.     Tiesha Prince MD, JASON, [facility]  Vascular Medicine

## 2021-05-24 NOTE — PROGRESS NOTES
Pre-Visit Planning   Next 5 appointments (look out 90 days)    May 25, 2021 10:35 AM  (Arrive by 10:15 AM)  Office Visit with Sepideh Burris PA-C  [facility] ([facility] ) [address] [id number] [phone]   Jul 21, 2021 11:30 AM  Return Visit with Jennifer Oquendo MD  [facility] [place] ([facility] [place] ) [address]  [facility] [id number] [phone]        Appointment Notes for this encounter:   discuss heart test    Questionnaires Reviewed/Assigned        Patient preferred phone number: [phone]      Answers for HPI/ROS submitted by the patient on 5/25/2021   Chronic problems general questions HPI Form  How many servings of fruits and vegetables do you eat daily?: 2-3  On average, how many sweetened beverages do you drink each day (Examples: soda, juice, sweet tea, etc.  Do NOT count diet or artificially sweetened beverages)?: 0  How many minutes a day do you exercise enough to make your heart beat faster?: 9 or less  How many days a week do you exercise enough to make your heart beat faster?: 3 or less  How many days per week do you miss taking your medication?: 0

## 2021-05-24 NOTE — PATIENT INSTRUCTIONS
1.your TSH is elevated, you will benefit with increasing thyroid medication dose to 75 mcg daily, new RX sent , take early am on empty stomach and do not mix with other pills.    2.continue to follow Lymphedema therapist recommendations, MLD, Pump, elevate legs etc.    3. Repeat TSH, Fasting lipids in 3 months , order placed then office or virtual visit.    Continue rest of the medications same.

## 2021-05-25 ENCOUNTER — HOSPITAL ENCOUNTER (OUTPATIENT)
Dept: OCCUPATIONAL THERAPY | Facility: CLINIC | Age: 71
Setting detail: THERAPIES SERIES
End: 2021-05-25
Attending: ORTHOPAEDIC SURGERY
Payer: COMMERCIAL

## 2021-05-25 ENCOUNTER — OFFICE VISIT (OUTPATIENT)
Dept: FAMILY MEDICINE | Facility: CLINIC | Age: 71
End: 2021-05-25
Payer: COMMERCIAL

## 2021-05-25 VITALS
OXYGEN SATURATION: 98 % | HEART RATE: 75 BPM | HEIGHT: 67 IN | RESPIRATION RATE: 16 BRPM | TEMPERATURE: 98 F | DIASTOLIC BLOOD PRESSURE: 65 MMHG | BODY MASS INDEX: 22.6 KG/M2 | SYSTOLIC BLOOD PRESSURE: 116 MMHG | WEIGHT: 144 LBS

## 2021-05-25 DIAGNOSIS — R59.0 AXILLARY LYMPHADENOPATHY: Primary | ICD-10-CM

## 2021-05-25 PROBLEM — I74.3: Status: RESOLVED | Noted: 2019-06-14 | Resolved: 2021-05-25

## 2021-05-25 PROBLEM — Z47.89 AFTERCARE FOLLOWING SURGERY OF THE MUSCULOSKELETAL SYSTEM: Status: RESOLVED | Noted: 2021-01-07 | Resolved: 2021-05-25

## 2021-05-25 PROCEDURE — 97140 MANUAL THERAPY 1/> REGIONS: CPT | Mod: GO | Performed by: OCCUPATIONAL THERAPIST

## 2021-05-25 PROCEDURE — 99213 OFFICE O/P EST LOW 20 MIN: CPT | Performed by: PHYSICIAN ASSISTANT

## 2021-05-25 RX ORDER — LEVOTHYROXINE SODIUM 50 UG/1
50 TABLET ORAL DAILY
COMMUNITY
Start: 2021-03-21 | End: 2021-05-25 | Stop reason: DRUGHIGH

## 2021-05-25 ASSESSMENT — MIFFLIN-ST. JEOR: SCORE: 1192.87

## 2021-05-25 NOTE — PROGRESS NOTES
"    Assessment & Plan     Axillary lymphadenopathy  Right side. Believes this was the side where she received her COVID vaccine.  No breast mass or TTP  Advised to monitor, if symptoms continue will get imaging.       reviewed notes from Vascular         See Patient Instructions    No follow-ups on file.    Sepideh Burris PA-C  M [facility] BRITT Suh is a 71 year old who presents for the following health issues     History of Present Illness       She eats 2-3 servings of fruits and vegetables daily.She consumes 0 sweetened beverage(s) daily.She exercises with enough effort to increase her heart rate 9 or less minutes per day.  She exercises with enough effort to increase her heart rate 3 or less days per week.   She is taking medications regularly.     [id number]}  Breast Concern  Onset/Duration: 2 weeks ago  Description:   Location: upper outer quadrant  Pain or tenderness: YES  Redness: no  Intensity: mild  Progression of Symptoms: same  Accompanying Signs & Symptoms:  Any lumps in axillary region:  no   Movable: not applicable  Nipple discharge: n/a  Changes in the skin or nipple: n/a  On Hormone therapy:  no   Does it change with menstrual cycle: not applicable  Previous history of similar problem: no  First degree relative with breast cancer: a negative family history for breast cancer.        Review of Systems   Constitutional, HEENT, cardiovascular, pulmonary, gi and gu systems are negative, except as otherwise noted.      Objective    /65 (BP Location: Right arm, Patient Position: Chair, Cuff Size: Adult Large)   Pulse 75   Temp 98  F (36.7  C) (Oral)   Resp 16   Ht 1.689 m (5' 6.5\")   Wt 65.3 kg (144 lb)   LMP 03/18/2005   SpO2 98%   BMI 22.89 kg/m    Body mass index is 22.89 kg/m .  Physical Exam   GENERAL APPEARANCE: healthy, alert and no distress  RESP: lungs clear to auscultation - no rales, rhonchi or wheezes  BREAST: normal without masses, " tenderness or nipple discharge  CV: regular rates and rhythm, normal S1 S2, no S3 or S4 and no murmur, click or rub  LYMPHATICS: axillary: right x1, mild

## 2021-05-26 ENCOUNTER — HOSPITAL ENCOUNTER (OUTPATIENT)
Dept: OCCUPATIONAL THERAPY | Facility: CLINIC | Age: 71
Setting detail: THERAPIES SERIES
End: 2021-05-26
Attending: ORTHOPAEDIC SURGERY
Payer: COMMERCIAL

## 2021-05-26 PROCEDURE — 97140 MANUAL THERAPY 1/> REGIONS: CPT | Mod: GO | Performed by: OCCUPATIONAL THERAPIST

## 2021-05-27 ENCOUNTER — HOSPITAL ENCOUNTER (OUTPATIENT)
Dept: OCCUPATIONAL THERAPY | Facility: CLINIC | Age: 71
Setting detail: THERAPIES SERIES
End: 2021-05-27
Attending: ORTHOPAEDIC SURGERY
Payer: COMMERCIAL

## 2021-05-27 PROCEDURE — 97140 MANUAL THERAPY 1/> REGIONS: CPT | Mod: GO | Performed by: OCCUPATIONAL THERAPIST

## 2021-05-27 NOTE — PROGRESS NOTES
[facility]      OUTPATIENT OCCUPATIONAL THERAPY  PLAN OF TREATMENT FOR OUTPATIENT REHABILITATION    Patient's Last Name, First Name, M.I.                YOB: 1950  Angeli Jacobson                        Provider's Name  [facility] Medical Record No.  [id number]                               Onset Date: 9/17/18   Start of Care Date: 9/17/20   Type:     ___PT   _X_OT   ___SLP Medical Diagnosis: Lymphedema                       OT Diagnosis: Lymphedema      _________________________________________________________________________________  Plan of Treatment:    Frequency/Duration: 2x/week for one week, then 0x/week for 6 weeks and once a week for one week.     Goals:  Goal Identifier 1   Goal Description Pt will be independent with modifications to home program.   Target Date 06/25/21   Date Met  05/19/21   Progress:goal met     Goal Identifier 2   Goal Description Pt will decrease fullness and the tightness in the leg -.8L to sit in chair with better knee bend and get closer to the table.   Target Date 06/25/21   Date Met      Progress:goal in progress     Goal Identifier 3   Goal Description Pt will get into/out of car with greater ease with less knee fluid.   Target Date 06/25/21   Date Met      Progress:goal in progress       Progress Toward Goals:   Progress this reporting period: PLease 7/15/215/26/21see PN dated 5/26/21      Certification date from 5/26/21 to 7/15/21.    JCARLOS Chacon CERTIFY THE NEED FOR THESE SERVICES FURNISHED UNDER        THIS PLAN OF TREATMENT AND WHILE UNDER MY CARE     (Physician co-signature of this document indicates review and certification of the therapy plan).                Referring Provider: Sepideh Burris PA-C

## 2021-05-27 NOTE — PROGRESS NOTES
Outpatient Occupational Therapy Progress Note     Patient: Angeli Jacobson  : 1950    Beginning/End Dates of Reporting Period:  21 to 2021    Referring Provider: Sepideh Burris PA-C    Therapy Diagnosis: Lymphedema    Client Self Report:   Pt reports the leg is softer.  She had mistakenly worn an old garment and had increase in calf one weekend.      Objective Measurements:        Pt was L>R 22% to start and on  was 14%.  Her best was 5% last fall.  When compared against the garment measures her knee conts to be about an inch freedman in the L leg than Nov msmts.      Outcome Measures (most recent score):   at d/c    Goals:   Goal Identifier 1   Goal Description Pt will be independent with modifications to home program.   Target Date 21   Date Met  21   Progress:goal met     Goal Identifier 2   Goal Description Pt will decrease fullness and the tightness in the leg -.8L to sit in chair with better knee bend and get closer to the table.   Target Date 21   Date Met      Progress:goal in progress     Goal Identifier 3   Goal Description Pt will get into/out of car with greater ease with less knee fluid.   Target Date 21   Date Met      Progress:goal in progress       Progress Toward Goals:   Progress this reporting period: Pt has been seen for 30 sessions since SOC last Sept.  She required a 3 week intensive, then fx the L leg and now is repeating the intensive 3 weeks.  Her leg is reducing and softening well.  The change in leg girth does not seem to be impacting the ROM of the knee as it did in previous sessions.  She will finish the intensive therapy in 2 days and then return for follow up in 6 weeks.  During this time she is to remeet with her surgeon to determine the plan for the knee joint.  We can redo the therapy as needed, but she is also prepared to care for the leg after their intervention.      Plan:  Continue therapy per current plan of  care.    Discharge:  No

## 2021-05-28 ENCOUNTER — HOSPITAL ENCOUNTER (OUTPATIENT)
Dept: OCCUPATIONAL THERAPY | Facility: CLINIC | Age: 71
Setting detail: THERAPIES SERIES
End: 2021-05-28
Attending: ORTHOPAEDIC SURGERY
Payer: COMMERCIAL

## 2021-05-28 PROCEDURE — 97140 MANUAL THERAPY 1/> REGIONS: CPT | Mod: GO | Performed by: OCCUPATIONAL THERAPIST

## 2021-06-01 DIAGNOSIS — N32.81 OAB (OVERACTIVE BLADDER): ICD-10-CM

## 2021-06-02 RX ORDER — FESOTERODINE FUMARATE 8 MG/1
8 TABLET, FILM COATED, EXTENDED RELEASE ORAL DAILY
Qty: 90 TABLET | Refills: 1 | Status: SHIPPED | OUTPATIENT
Start: 2021-06-02 | End: 2021-06-07

## 2021-06-07 ENCOUNTER — TRANSFERRED RECORDS (OUTPATIENT)
Dept: HEALTH INFORMATION MANAGEMENT | Facility: CLINIC | Age: 71
End: 2021-06-07

## 2021-06-21 ENCOUNTER — MYC MEDICAL ADVICE (OUTPATIENT)
Dept: PEDIATRICS | Facility: CLINIC | Age: 71
End: 2021-06-21

## 2021-06-21 ENCOUNTER — VIRTUAL VISIT (OUTPATIENT)
Dept: PEDIATRICS | Facility: CLINIC | Age: 71
End: 2021-06-21
Payer: COMMERCIAL
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
## 2021-06-21 DIAGNOSIS — S22.42XD CLOSED FRACTURE OF MULTIPLE RIBS OF LEFT SIDE WITH ROUTINE HEALING, SUBSEQUENT ENCOUNTER: ICD-10-CM

## 2021-06-21 PROCEDURE — 99214 OFFICE O/P EST MOD 30 MIN: CPT | Mod: 95 | Performed by: INTERNAL MEDICINE

## 2021-06-21 RX ORDER — HYDROCODONE BITARTRATE AND ACETAMINOPHEN 5; 325 MG/1; MG/1
1 TABLET ORAL EVERY 6 HOURS PRN
Qty: 20 TABLET | Refills: 0 | Status: SHIPPED | OUTPATIENT
Start: 2021-06-21 | End: 2021-06-21

## 2021-06-21 RX ORDER — HYDROCODONE BITARTRATE AND ACETAMINOPHEN 5; 325 MG/1; MG/1
1 TABLET ORAL EVERY 6 HOURS PRN
Qty: 20 TABLET | Refills: 0 | Status: SHIPPED | OUTPATIENT
Start: 2021-06-21 | End: 2021-06-27
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
## 2021-06-21 NOTE — TELEPHONE ENCOUNTER
Just had VRT Visit today with Dr. Najera. Requesting alternate pharmacy due to techincal issue at preferred pharmacy.    Routing refill request to provider for review/approval because:  Drug not on the [facility] refill protocol

## 2021-06-25 ENCOUNTER — NURSE TRIAGE (OUTPATIENT)
Dept: PEDIATRICS | Facility: CLINIC | Age: 71
End: 2021-06-25

## 2021-06-25 ENCOUNTER — MYC MEDICAL ADVICE (OUTPATIENT)
Dept: PEDIATRICS | Facility: CLINIC | Age: 71
End: 2021-06-25

## 2021-06-25 DIAGNOSIS — S22.42XD CLOSED FRACTURE OF MULTIPLE RIBS OF LEFT SIDE WITH ROUTINE HEALING, SUBSEQUENT ENCOUNTER: ICD-10-CM

## 2021-06-25 NOTE — TELEPHONE ENCOUNTER
"    Reason for Disposition    Mild constipation    Answer Assessment - Initial Assessment Questions  1. STOOL PATTERN OR FREQUENCY: \"How often do you pass bowel movements (BMs)?\"  (Normal range: tid to q 3 days)  \"When was the last BM passed?\"        Last Friday   2. STRAINING: \"Do you have to strain to have a BM?\"       NA  3. RECTAL PAIN: \"Does your rectum hurt when the stool comes out?\" If so, ask: \"Do you have hemorrhoids? How bad is the pain?\"  (Scale 1-10; or mild, moderate, severe)      No pain - no urge to pass stool   4. STOOL COMPOSITION: \"Are the stools hard?\"       No   5. BLOOD ON STOOLS: \"Has there been any blood on the toilet tissue or on the surface of the BM?\" If so, ask: \"When was the last time?\"       No   6. CHRONIC CONSTIPATION: \"Is this a new problem for you?\"  If no, ask: \"How long have you had this problem?\" (days, weeks, months)       New problem   7. CHANGES IN DIET: \"Have there been any recent changes in your diet?\"       No  8. MEDICATIONS: \"Have you been taking any new medications?\"      Hydrocodone  9. LAXATIVES: \"Have you been using any laxatives or enemas?\"  If yes, ask \"What, how often, and when was the last time?\"      This morning dulcolax suppository, senna-armando 3-4x   10. CAUSE: \"What do you think is causing the constipation?\"         Pain meds   11. OTHER SYMPTOMS: \"Do you have any other symptoms?\" (e.g., abdominal pain, fever, vomiting)        Feels bloated  12. PREGNANCY: \"Is there any chance you are pregnant?\" \"When was your last menstrual period?\"        na    Protocols used: CONSTIPATION-A-OH      "

## 2021-06-27 RX ORDER — CYCLOBENZAPRINE HCL 10 MG
5 TABLET ORAL 3 TIMES DAILY PRN
Qty: 60 TABLET | Refills: 0 | Status: SHIPPED | OUTPATIENT
Start: 2021-06-27 | End: 2021-09-21

## 2021-06-27 RX ORDER — HYDROCODONE BITARTRATE AND ACETAMINOPHEN 5; 325 MG/1; MG/1
1 TABLET ORAL EVERY 6 HOURS PRN
Qty: 30 TABLET | Refills: 0 | Status: SHIPPED | OUTPATIENT
Start: 2021-06-27 | End: 2021-09-21

## 2021-06-28 ENCOUNTER — MYC MEDICAL ADVICE (OUTPATIENT)
Dept: FAMILY MEDICINE | Facility: CLINIC | Age: 71
End: 2021-06-28

## 2021-06-28 ENCOUNTER — HOSPITAL ENCOUNTER (OUTPATIENT)
Dept: OCCUPATIONAL THERAPY | Facility: CLINIC | Age: 71
Setting detail: THERAPIES SERIES
End: 2021-06-28
Attending: ORTHOPAEDIC SURGERY
Payer: COMMERCIAL

## 2021-06-28 ENCOUNTER — TRANSFERRED RECORDS (OUTPATIENT)
Dept: HEALTH INFORMATION MANAGEMENT | Facility: CLINIC | Age: 71
End: 2021-06-28

## 2021-06-28 PROCEDURE — 97140 MANUAL THERAPY 1/> REGIONS: CPT | Mod: GO | Performed by: OCCUPATIONAL THERAPIST
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
## 2021-06-29 ENCOUNTER — TELEPHONE (OUTPATIENT)
Dept: FAMILY MEDICINE | Facility: CLINIC | Age: 71
End: 2021-06-29
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
## 2021-06-30 ENCOUNTER — OFFICE VISIT (OUTPATIENT)
Dept: FAMILY MEDICINE | Facility: CLINIC | Age: 71
End: 2021-06-30
Payer: COMMERCIAL

## 2021-06-30 VITALS
WEIGHT: 143 LBS | HEIGHT: 67 IN | TEMPERATURE: 98.2 F | OXYGEN SATURATION: 99 % | RESPIRATION RATE: 18 BRPM | SYSTOLIC BLOOD PRESSURE: 124 MMHG | BODY MASS INDEX: 22.44 KG/M2 | DIASTOLIC BLOOD PRESSURE: 78 MMHG | HEART RATE: 90 BPM
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
## 2021-06-30 DIAGNOSIS — M62.838 MUSCLE SPASM: ICD-10-CM

## 2021-06-30 DIAGNOSIS — Z11.59 ENCOUNTER FOR SCREENING FOR OTHER VIRAL DISEASES: ICD-10-CM

## 2021-06-30 PROCEDURE — 99213 OFFICE O/P EST LOW 20 MIN: CPT | Performed by: PHYSICIAN ASSISTANT

## 2021-06-30 RX ORDER — FESOTERODINE FUMARATE 4 MG/1
TABLET, FILM COATED, EXTENDED RELEASE ORAL
COMMUNITY
Start: 2020-11-09 | End: 2021-07-21

## 2021-06-30 ASSESSMENT — PAIN SCALES - GENERAL: PAINLEVEL: MODERATE PAIN (4)

## 2021-06-30 ASSESSMENT — MIFFLIN-ST. JEOR: SCORE: 1196.27
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
## 2021-07-01 ENCOUNTER — HOSPITAL ENCOUNTER (OUTPATIENT)
Dept: CT IMAGING | Facility: CLINIC | Age: 71
Discharge: HOME | End: 2021-07-01
Attending: PHYSICIAN ASSISTANT | Admitting: PHYSICIAN ASSISTANT
Payer: COMMERCIAL

## 2021-07-01 DIAGNOSIS — E78.5 HYPERLIPIDEMIA LDL GOAL <70: ICD-10-CM

## 2021-07-01 DIAGNOSIS — Z82.49 FAMILY HISTORY OF CORONARY ARTERY DISEASE: ICD-10-CM

## 2021-07-01 PROCEDURE — 75571 CT HRT W/O DYE W/CA TEST: CPT | Mod: 26 | Performed by: INTERNAL MEDICINE

## 2021-07-01 PROCEDURE — 75571 CT HRT W/O DYE W/CA TEST: CPT

## 2021-07-02 ENCOUNTER — TELEPHONE (OUTPATIENT)
Dept: INTERVENTIONAL RADIOLOGY/VASCULAR | Facility: CLINIC | Age: 71
End: 2021-07-02

## 2021-07-02 DIAGNOSIS — R91.1 LEFT LOWER LOBE PULMONARY NODULE: Primary | ICD-10-CM

## 2021-07-02 DIAGNOSIS — I73.9 PAD (PERIPHERAL ARTERY DISEASE) (H): ICD-10-CM

## 2021-07-02 DIAGNOSIS — Z85.831 HISTORY OF SARCOMA: ICD-10-CM

## 2021-07-02 DIAGNOSIS — R93.1 HIGH CORONARY ARTERY CALCIUM SCORE: Primary | ICD-10-CM

## 2021-07-02 DIAGNOSIS — E78.5 HYPERLIPIDEMIA LDL GOAL <70: ICD-10-CM

## 2021-07-07 ENCOUNTER — MYC MEDICAL ADVICE (OUTPATIENT)
Dept: FAMILY MEDICINE | Facility: CLINIC | Age: 71
End: 2021-07-07

## 2021-07-07 NOTE — TELEPHONE ENCOUNTER
Sepideh Burris PA-C   Please review my chart message regarding tylenol dosing     RN already responded that fasting lab work not needed for preop   Recently checked on 5/17/21    Le Thomas, Registered Nurse, PAL (Patient Advocate Liason)   [facility]   [phone]
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
## 2021-07-21 ENCOUNTER — OFFICE VISIT (OUTPATIENT)
Dept: UROLOGY | Facility: CLINIC | Age: 71
End: 2021-07-21
Payer: COMMERCIAL

## 2021-07-21 ENCOUNTER — OFFICE VISIT (OUTPATIENT)
Dept: FAMILY MEDICINE | Facility: CLINIC | Age: 71
End: 2021-07-21
Payer: COMMERCIAL

## 2021-07-21 ENCOUNTER — MYC MEDICAL ADVICE (OUTPATIENT)
Dept: UROLOGY | Facility: CLINIC | Age: 71
End: 2021-07-21

## 2021-07-21 VITALS
DIASTOLIC BLOOD PRESSURE: 76 MMHG | SYSTOLIC BLOOD PRESSURE: 133 MMHG | TEMPERATURE: 97.9 F | HEART RATE: 77 BPM | BODY MASS INDEX: 21.97 KG/M2 | RESPIRATION RATE: 16 BRPM | OXYGEN SATURATION: 97 % | HEIGHT: 67 IN | WEIGHT: 140 LBS

## 2021-07-21 VITALS
HEIGHT: 67 IN | OXYGEN SATURATION: 98 % | SYSTOLIC BLOOD PRESSURE: 110 MMHG | DIASTOLIC BLOOD PRESSURE: 72 MMHG | HEART RATE: 88 BPM | BODY MASS INDEX: 21.97 KG/M2 | WEIGHT: 140 LBS

## 2021-07-21 DIAGNOSIS — Z78.0 ASYMPTOMATIC POSTMENOPAUSAL STATUS: ICD-10-CM

## 2021-07-21 DIAGNOSIS — I73.9 PERIPHERAL VASCULAR DISEASE, UNSPECIFIED (H): ICD-10-CM

## 2021-07-21 DIAGNOSIS — N32.81 OAB (OVERACTIVE BLADDER): Primary | ICD-10-CM

## 2021-07-21 DIAGNOSIS — M62.89 PELVIC FLOOR DYSFUNCTION: ICD-10-CM

## 2021-07-21 DIAGNOSIS — Z01.818 PREOP GENERAL PHYSICAL EXAM: Primary | ICD-10-CM

## 2021-07-21 DIAGNOSIS — Z12.11 SCREEN FOR COLON CANCER: ICD-10-CM

## 2021-07-21 LAB
ALBUMIN UR-MCNC: NEGATIVE MG/DL
APPEARANCE UR: CLEAR
BILIRUB UR QL STRIP: NEGATIVE
COLOR UR AUTO: YELLOW
ERYTHROCYTE [DISTWIDTH] IN BLOOD BY AUTOMATED COUNT: 14.3 % (ref 10–15)
GLUCOSE UR STRIP-MCNC: NEGATIVE MG/DL
HCT VFR BLD AUTO: 42.2 % (ref 35–47)
HGB BLD-MCNC: 13.7 G/DL (ref 11.7–15.7)
HGB UR QL STRIP: NEGATIVE
KETONES UR STRIP-MCNC: NEGATIVE MG/DL
LEUKOCYTE ESTERASE UR QL STRIP: ABNORMAL
MCH RBC QN AUTO: 29.9 PG (ref 26.5–33)
MCHC RBC AUTO-ENTMCNC: 32.5 G/DL (ref 31.5–36.5)
MCV RBC AUTO: 92 FL (ref 78–100)
NITRATE UR QL: NEGATIVE
PH UR STRIP: 6.5 [PH] (ref 5–7)
PLATELET # BLD AUTO: 261 10E3/UL (ref 150–450)
RBC # BLD AUTO: 4.58 10E6/UL (ref 3.8–5.2)
RESIDUAL VOLUME (RV) (EXTERNAL): 28
SP GR UR STRIP: 1.02 (ref 1–1.03)
UROBILINOGEN UR STRIP-ACNC: 0.2 E.U./DL
WBC # BLD AUTO: 6.2 10E3/UL (ref 4–11)

## 2021-07-21 PROCEDURE — 36415 COLL VENOUS BLD VENIPUNCTURE: CPT | Performed by: PHYSICIAN ASSISTANT

## 2021-07-21 PROCEDURE — 85027 COMPLETE CBC AUTOMATED: CPT | Performed by: PHYSICIAN ASSISTANT

## 2021-07-21 PROCEDURE — 99213 OFFICE O/P EST LOW 20 MIN: CPT | Mod: 25 | Performed by: UROLOGY

## 2021-07-21 PROCEDURE — 93000 ELECTROCARDIOGRAM COMPLETE: CPT | Performed by: PHYSICIAN ASSISTANT

## 2021-07-21 PROCEDURE — 81003 URINALYSIS AUTO W/O SCOPE: CPT | Mod: QW | Performed by: UROLOGY

## 2021-07-21 PROCEDURE — 99214 OFFICE O/P EST MOD 30 MIN: CPT | Performed by: PHYSICIAN ASSISTANT

## 2021-07-21 PROCEDURE — 51798 US URINE CAPACITY MEASURE: CPT | Performed by: UROLOGY

## 2021-07-21 RX ORDER — FESOTERODINE FUMARATE 4 MG/1
4 TABLET, FILM COATED, EXTENDED RELEASE ORAL DAILY
Qty: 90 TABLET | Refills: 3 | Status: SHIPPED | OUTPATIENT
Start: 2021-07-21 | End: 2022-02-25

## 2021-07-21 ASSESSMENT — PAIN SCALES - GENERAL: PAINLEVEL: NO PAIN (0)

## 2021-07-21 ASSESSMENT — MIFFLIN-ST. JEOR
SCORE: 1174.73
SCORE: 1174.73
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
## 2021-07-21 NOTE — NURSING NOTE
Chief Complaint   Patient presents with     Over active bladder   Patients PVR was 28 ml today.  Jenni Nick LPN
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
## 2021-07-21 NOTE — PATIENT INSTRUCTIONS

## 2021-07-21 NOTE — PATIENT INSTRUCTIONS
Websites with free information:    [employer] patient website: www.voicesforpfd.org    Total Control Program: www.totalcontrolprogram.com    Continue the medication    Consider returning to see one of the dedicated pelvic floor physical therapist ([facility] [phone])    Return in one year, sooner if needed    It was a pleasure meeting with you today.  Thank you for allowing me and my team the privilege of caring for you today.  YOU are the reason we are here, and I truly hope we provided you with the excellent service you deserve.  Please let us know if there is anything else we can do for you so that we can be sure you are leaving completely satisfied with your care experience.

## 2021-07-21 NOTE — PROGRESS NOTES
[facility]  [id number] [place] [id number]  Phone: [phone]  Primary Provider: Sepideh Isabel  Pre-op Performing Provider: SEPIDEH ISABEL      PREOPERATIVE EVALUATION:  Today's date: 7/21/2021    Angeli Jacobson is a 71 year old female who presents for a preoperative evaluation.    Surgical Information:  Surgery/Procedure: extend tendon in left leg  Surgery Location: [facility]  Surgeon: Dr. Celeste  Surgery Date: 7/28/21  Time of Surgery: 7:30  Where patient plans to recover: At home with family  Fax number for surgical facility: Note does not need to be faxed, will be available electronically in Epic.    Type of Anesthesia Anticipated: General    Assessment & Plan     The proposed surgical procedure is considered INTERMEDIATE risk.    Preop general physical exam    - CBC with platelets; Future  - EKG 12-lead complete w/read - Clinics  - CBC with platelets    Peripheral vascular disease, unspecified (H)      Asymptomatic postmenopausal status    - DX Hip/Pelvis/Spine; Future    Screen for colon cancer    - Adult Gastro Ref - Procedure Only; Future         Risks and Recommendations:  The patient has the following additional risks and recommendations for perioperative complications:      Medication Instructions:   - aspirin: Discontinue aspirin 7-10 days prior to procedure to reduce bleeding risk. It should be resumed postoperatively.    - clopidrogel (Plavix), prasugrel (Effient), ticagrelor (Brilinta): No contraindication to stopping Plavix, HOLD 5-7 days before surgery.    - Statins: Continue taking on the day of surgery.     RECOMMENDATION:  APPROVAL GIVEN to proceed with proposed procedure, without further diagnostic evaluation.    Subjective     HPI related to upcoming procedure: left knee pain    Preop Questions 7/16/2021   1. Have you ever had a heart attack or stroke? No   2. Have you ever had surgery on your heart or blood vessels, such as a stent  placement, a coronary artery bypass, or surgery on an artery in your head, neck, heart, or legs? YES - stent left SFA in bypass graft   3. Do you have chest pain with activity? No   4. Do you have a history of  heart failure? No   5. Do you currently have a cold, bronchitis or symptoms of other infection? No   6. Do you have a cough, shortness of breath, or wheezing? No   7. Do you or anyone in your family have previous history of blood clots? No   8. Do you or does anyone in your family have a serious bleeding problem such as prolonged bleeding following surgeries or cuts? No   9. Have you ever had problems with anemia or been told to take iron pills? YES - h/o anemia   10. Have you had any abnormal blood loss such as black, tarry or bloody stools, or abnormal vaginal bleeding? No   11. Have you ever had a blood transfusion? YES -    11a. Have you ever had a transfusion reaction? No   12. Are you willing to have a blood transfusion if it is medically needed before, during, or after your surgery? Yes   13. Have you or any of your relatives ever had problems with anesthesia? No   14. Do you have sleep apnea, excessive snoring or daytime drowsiness? No   15. Do you have any artifical heart valves or other implanted medical devices like a pacemaker, defibrillator, or continuous glucose monitor? No   16. Do you have artificial joints? YES - hip   17. Are you allergic to latex? No   18. Is there any chance that you may be pregnant? -       Health Care Directive:  Patient has a Health Care Directive on file      Preoperative Review of :   reviewed - controlled substances reflected in medication list.      Status of Chronic Conditions:  See problem list for active medical problems.  Problems all longstanding and stable, except as noted/documented.  See ROS for pertinent symptoms related to these conditions.      Review of Systems  CONSTITUTIONAL: NEGATIVE for fever, chills, change in weight  INTEGUMENTARY/SKIN:  NEGATIVE for worrisome rashes, moles or lesions  EYES: NEGATIVE for vision changes or irritation  ENT/MOUTH: NEGATIVE for ear, mouth and throat problems  RESP: NEGATIVE for significant cough or SOB  CV: NEGATIVE for chest pain, palpitations or peripheral edema  GI: NEGATIVE for nausea, abdominal pain, heartburn, or change in bowel habits  : NEGATIVE for frequency, dysuria, or hematuria  NEURO: NEGATIVE for weakness, dizziness or paresthesias  ENDOCRINE: NEGATIVE for temperature intolerance, skin/hair changes  HEME: NEGATIVE for bleeding problems  PSYCHIATRIC: NEGATIVE for changes in mood or affect    Patient Active Problem List    Diagnosis Date Noted     Closed fracture of left tibia and fibula, initial encounter 12/19/2020     Priority: Medium     Closed displaced fracture of left patella, unspecified fracture morphology, initial encounter 12/19/2020     Priority: Medium     Acute pain of left knee 10/29/2019     Priority: Medium     S/P total hip arthroplasty 10/04/2019     Priority: Medium     Myalgia of pelvic floor 09/11/2019     Priority: Medium     Lesion of bladder 09/11/2019     Priority: Medium     Postural urinary incontinence 07/17/2019     Priority: Medium     Personal history of urinary tract infection 07/17/2019     Priority: Medium     OAB (overactive bladder) 07/17/2019     Priority: Medium     S/P ORIF (open reduction internal fixation) fracture 05/02/2019     Priority: Medium     Hip pain, left 05/02/2019     Priority: Medium     History of sarcoma 01/21/2019     Priority: Medium     Femoral-popliteal bypass graft occlusion, 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     Priority: Medium     CHRONIC LEFT THIGH LYMPHEDEMA       Impaired fasting glucose 06/16/2010     Priority: Low     Hearing loss 01/29/2007     Priority: Low     Has hearing aids        Past Medical History:   Diagnosis Date     * * * SBE PROPHYLAXIS * * * 1998    Amox 500mg, take 4 tabs one hour prior to procedure.Takes this because of lymphedema secondary from leg surgey     Central serous retinopathy 2001    Resolved 9/2001     CHRONIC NECK PAIN 1995     Depressive disorder      Depressive disorder, not elsewhere classified 2001     History of blood transfusion 04/2019    during left hip pinning     Lateral epicondylitis      MIXED HYPERLIPIDEMIA, LDL GOAL <160 1998    LDL goal < 160     Motion sickness      MYXOID LIPOSARCOMA 2000    Left thigh, S/P excision, radiation  at [facility]     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    CHRONIC LEFT THIGH LYMPHEDEMA     Nontoxic multinodular goiter 2005    needs yearly      Osteoporosis, unspecified 2001     Other lymphedema 2000    left thigh, gets regular PT for this     PAD (peripheral artery disease) (H) 4/20/2018     PONV (postoperative nausea and vomiting)      SHINGLES 2001     Sprain of lumbosacral (joint) (ligament) 1995    right     Unspecified hearing loss 1998    chronic tinnitus     Unspecified tinnitus 1998     Past Surgical History:    Procedure Laterality Date     ARTHROPLASTY HIP Left 10/4/2019    Procedure: Removal of left femoral hardware with a conversion to left total hip arthroplasty using a Biomet Annalisa femoral stem with an OsseoTi acetabular shell and a dual mobility bearing surface;  Surgeon: Spencer Celeste MD;  Location: [facility] OR     BYPASS GRAFT FEMOROPOPLITEAL Left 1/21/2019    Procedure: LEFT FEMORAL TO ABOVE KNEE POPLITEAL  BYPASS WITH POLYTETRAFLUOROETHYLENE GRAFT;  Surgeon: Shdae Owens MD;  Location:  OR     C APPENDECTOMY      Appendectomy     COLONOSCOPY N/A 2/5/2016    Procedure: COMBINED COLONOSCOPY, SINGLE OR MULTIPLE BIOPSY/POLYPECTOMY BY BIOPSY;  Surgeon: Varun Stanley MD, MD;  Location:  GI     CYSTOSCOPY       EXCISION MALIG LESION>1.25CM  5/2000    Myxoid Liposarcoma       EXPLORE GROIN Right 5/1/2018    Procedure: EXPLORE GROIN;  EMERGENCY RIGHT FEMORAL EXPLORATION WITH FEMORAL ARTERY REPAIR.    EBL: 50mL;  Surgeon: Shade Owens MD;  Location:  OR     HC COLP CERVIX/UPPER VAGINA  07/1997    Negative     HC DILATION/CURETTAGE DIAG/THER NON OB  02/1997    Post menopausal bleeding on HRT, negative     HIP SURGERY Left 04/13/2019     IR ANGIOGRAM THROUGH CATHETER FOLLOW UP  12/20/2018     IR ANGIOGRAM THROUGH CATHETER FOLLOW UP  12/21/2018     IR LOWER EXTREMITY ANGIOGRAM LEFT  12/19/2018     OPEN REDUCTION INTERNAL FIXATION PATELLA Left 12/21/2020    Procedure: Left partial patella fracture excision with advancement of the quadriceps tendon;  Surgeon: Stephen Rhodes MD;  Location:  OR     OPEN REDUCTION INTERNAL FIXATION RODDING INTRAMEDULLARY TIBIA Left 12/21/2020    Procedure: Open reduction intramedullary nailing of left tibia fracture;  Surgeon: Stephen Rhodes MD;  Location:  OR     VASCULAR SURGERY  04/30/2018    Left SFA stent in bypass graft     ZZC NONSPECIFIC PROCEDURE  04/2000    Open Biopsy Left Thigh Liposarcoma     ZZHC COLONOSCOPY THRU STOMA, DIAGNOSTIC  2006    due  2010     Current Outpatient Medications   Medication Sig Dispense Refill     acetaminophen (TYLENOL) 325 MG tablet Take 2 tablets (650 mg) by mouth every 6 hours as needed for mild pain       alendronate (FOSAMAX) 70 MG tablet TAKE 1 TABLET BY MOUTH EVERY 7 DAYS W/8 OZ WATER 30 MIN BEFORE BREAKFAST/REMAIN UPRIGHT 12 tablet 3     aspirin (ASA) 81 MG chewable tablet Take 81 mg by mouth daily       calcium carbonate 600 mg-vitamin D 400 units (CALTRATE) 600-400 MG-UNIT per tablet Take 1 chew tab by mouth daily       clopidogrel (PLAVIX) 75 MG tablet Take 1 tablet (75 mg) by mouth daily 90 tablet 3     cyclobenzaprine (FLEXERIL) 10 MG tablet Take 0.5 tablets (5 mg) by mouth 3 times daily as needed for muscle spasms 60 tablet 0     ezetimibe (ZETIA) 10 MG tablet Take 1 tablet (10 mg) by mouth daily 90 tablet 3     fesoterodine fumarate (TOVIAZ) 4 MG TB24 24 hr tablet        HYDROcodone-acetaminophen (NORCO) 5-325 MG tablet Take 1 tablet by mouth every 6 hours as needed for moderate to severe pain 30 tablet 0     levothyroxine (SYNTHROID/LEVOTHROID) 75 MCG tablet Take 1 tablet (75 mcg) by mouth daily 30 tablet 11     multivitamin, therapeutic (THERA-VIT) TABS tablet Take 1 tablet by mouth daily       nitroFURantoin macrocrystal-monohydrate (MACROBID) 100 MG capsule TAKE 1 CAPSULE BY MOUTH AFTER INTRCOURSE 30 capsule 3     order for DME Equipment being ordered: Left Thigh High Compression Stocking, 550 CCL.3 1 each 99     ORDER FOR DME Equipment being ordered: lymphedema bandages 2 Device 1     rosuvastatin (CRESTOR) 20 MG tablet Take 1 tablet (20 mg) by mouth daily 90 tablet 1     Vitamin D3 (CHOLECALCIFEROL) 25 mcg (1000 units) tablet Take 2 tablets (50 mcg) by mouth daily 60 tablet 0       Allergies   Allergen Reactions     Celexa [Citalopram Hydrobromide]      Decreased libido        Social History     Tobacco Use     Smoking status: Never Smoker     Smokeless tobacco: Never Used   Substance Use Topics     Alcohol use:  "No     Family History   Problem Relation Age of Onset     C.A.D. Father         MI 57     Alcohol/Drug Father         etoh     Obesity Mother      Osteoporosis Mother      Colon Cancer Brother 70     Hyperlipidemia Son      Hyperlipidemia Son         very high, experimental drug     C.A.D. Paternal Grandmother         ascvd     Diabetes Maternal Grandmother      Cancer Maternal Grandmother      C.A.D. Paternal Uncle         Mi  age 48     Cancer Maternal Aunt         pancreatic CA     Hyperlipidemia Son      History   Drug Use No         Objective     /76 (BP Location: Right arm, Patient Position: Chair, Cuff Size: Adult Regular)   Pulse 77   Temp 97.9  F (36.6  C) (Oral)   Resp 16   Ht 1.689 m (5' 6.5\")   Wt 63.5 kg (140 lb)   LMP 2005   SpO2 97%   BMI 22.26 kg/m      Physical Exam    GENERAL APPEARANCE: healthy, alert and no distress     EYES: EOMI, PERRL     HENT: ear canals and TM's normal and nose and mouth without ulcers or lesions     NECK: no adenopathy, no asymmetry, masses, or scars and thyroid normal to palpation     RESP: lungs clear to auscultation - no rales, rhonchi or wheezes     CV: regular rates and rhythm, normal S1 S2, no S3 or S4 and no murmur, click or rub     ABDOMEN:  soft, nontender, no HSM or masses and bowel sounds normal     SKIN: no suspicious lesions or rashes     NEURO: Normal strength and tone, sensory exam grossly normal, mentation intact and speech normal     PSYCH: mentation appears normal. and affect normal/bright     LYMPHATICS: No cervical adenopathy    Recent Labs   Lab Test 21  0835 20  0758 20  0744 10/04/19  1522 19  0929   HGB 13.2 8.5* 10.0*   < > 12.8     --  214  --  280   INR  --   --   --   --  1.01     --  137   < > 140   POTASSIUM 4.3  --  4.1   < > 3.9   CR 0.75  --  0.60  --  0.55    < > = values in this interval not displayed.        Diagnostics:  Recent Results (from the past 24 hour(s))   CBC with " platelets    Collection Time: 07/21/21  9:57 AM   Result Value Ref Range    WBC Count 6.2 4.0 - 11.0 10e3/uL    RBC Count 4.58 3.80 - 5.20 10e6/uL    Hemoglobin 13.7 11.7 - 15.7 g/dL    Hematocrit 42.2 35.0 - 47.0 %    MCV 92 78 - 100 fL    MCH 29.9 26.5 - 33.0 pg    MCHC 32.5 31.5 - 36.5 g/dL    RDW 14.3 10.0 - 15.0 %    Platelet Count 261 150 - 450 10e3/uL      EKG required for peripheral arterial disease and not completed in the last 90 days.     Revised Cardiac Risk Index (RCRI):  The patient has the following serious cardiovascular risks for perioperative complications:   - Coronary Artery Disease (MI, positive stress test, angina, Qs on EKG) = 1 point     RCRI Interpretation: 1 point: Class II (low risk - 0.9% complication rate)           Signed Electronically by: Sepideh Burris PA-C  Copy of this evaluation report is provided to requesting physician.

## 2021-07-22 ENCOUNTER — MYC MEDICAL ADVICE (OUTPATIENT)
Dept: FAMILY MEDICINE | Facility: CLINIC | Age: 71
End: 2021-07-22

## 2021-07-22 ASSESSMENT — MIFFLIN-ST. JEOR: SCORE: 1174.73

## 2021-07-22 NOTE — TELEPHONE ENCOUNTER
Sepideh Burris PA-C / covering providers     Can you please review my chart message and advise on tylenol usage ?   Left knee surgery scheduled for 7/28/21  Pre op on 7/21/21 does not mention any need for change in tylenol     [place], Registered Nurse, PAL (Patient Advocate Liason)   [facility]   [phone]

## 2021-07-23 NOTE — PHARMACY-ADMISSION MEDICATION HISTORY
PTA meds completed by pre-admitting nurse Lory Pastor, and reviewed by pharmacy     Prior to Admission medications    Medication Sig Last Dose Taking? Auth Provider   acetaminophen (TYLENOL) 325 MG tablet Take 2 tablets (650 mg) by mouth every 6 hours as needed for mild pain  Yes Irena Gilliam PA-C   alendronate (FOSAMAX) 70 MG tablet TAKE 1 TABLET BY MOUTH EVERY 7 DAYS W/8 OZ WATER 30 MIN BEFORE BREAKFAST/REMAIN UPRIGHT  Yes Irena Gilliam PA-C   aspirin (ASA) 81 MG chewable tablet Take 81 mg by mouth daily  Yes Reported, Patient   calcium carbonate 600 mg-vitamin D 400 units (CALTRATE) 600-400 MG-UNIT per tablet Take 1 chew tab by mouth daily  Yes Reported, Patient   clopidogrel (PLAVIX) 75 MG tablet Take 1 tablet (75 mg) by mouth daily  Yes Tiesha Prince MD   ezetimibe (ZETIA) 10 MG tablet Take 1 tablet (10 mg) by mouth daily  Yes Tiesha Prince MD   fesoterodine fumarate (TOVIAZ) 4 MG TB24 24 hr tablet Take 1 tablet (4 mg) by mouth daily  Yes Jennifer Oquendo MD   levothyroxine (SYNTHROID/LEVOTHROID) 75 MCG tablet Take 1 tablet (75 mcg) by mouth daily  Yes Tiesha Prince MD   multivitamin, therapeutic (THERA-VIT) TABS tablet Take 1 tablet by mouth daily  Yes Reported, Patient   nitroFURantoin macrocrystal-monohydrate (MACROBID) 100 MG capsule TAKE 1 CAPSULE BY MOUTH AFTER INTRCOURSE  Yes Sepideh Burris PA-C   rosuvastatin (CRESTOR) 20 MG tablet Take 1 tablet (20 mg) by mouth daily  Yes Ramses Benitez MD   Vitamin D3 (CHOLECALCIFEROL) 25 mcg (1000 units) tablet Take 2 tablets (50 mcg) by mouth daily  Yes Sepideh Burris PA-C   cyclobenzaprine (FLEXERIL) 10 MG tablet Take 0.5 tablets (5 mg) by mouth 3 times daily as needed for muscle spasms   Dayton Najera MD   HYDROcodone-acetaminophen (NORCO) 5-325 MG tablet Take 1 tablet by mouth every 6 hours as needed for moderate to severe pain   Dayton Najera MD   order for DME  Equipment being ordered: Left Thigh High Compression Stocking, 550 CCL.3   Yanelis Bailey MD   ORDER FOR DME Equipment being ordered: lymphedema bandages   Yanelis Bailey MD

## 2021-07-26 ENCOUNTER — APPOINTMENT (OUTPATIENT)
Dept: LAB | Facility: CLINIC | Age: 71
End: 2021-07-26
Payer: COMMERCIAL

## 2021-07-26 ENCOUNTER — MYC MEDICAL ADVICE (OUTPATIENT)
Dept: FAMILY MEDICINE | Facility: CLINIC | Age: 71
End: 2021-07-26

## 2021-07-26 ENCOUNTER — HOSPITAL ENCOUNTER (OUTPATIENT)
Dept: OCCUPATIONAL THERAPY | Facility: CLINIC | Age: 71
Setting detail: THERAPIES SERIES
End: 2021-07-26
Attending: ORTHOPAEDIC SURGERY
Payer: COMMERCIAL

## 2021-07-26 PROCEDURE — 97140 MANUAL THERAPY 1/> REGIONS: CPT | Mod: GO | Performed by: OCCUPATIONAL THERAPIST

## 2021-07-26 PROCEDURE — U0005 INFEC AGEN DETEC AMPLI PROBE: HCPCS | Performed by: ORTHOPAEDIC SURGERY

## 2021-07-27 NOTE — PROGRESS NOTES
OUTPATIENT OCCUPATIONAL THERAPY  PLAN OF TREATMENT FOR OUTPATIENT REHABILITATION AND PROGRESS NOTE    Patient's Last Name, First Name, Angeli Pond Date of Birth  1950   Provider's Name  [facility] Medical Record No.  [id number]    Onset Date  9/17/18 Start of Care Date  9/17/20   Type:     __PT   _X_OT   __SLP Medical Diagnosis  Lymphedema   OT Diagnosis  Lymphedema Plan of Treatment  Frequency/Duration: 0x/week for 4 weeks  Certification date from 8/2/21 to 9/15/21     Goals:  Goal Identifier 1   Goal Description Pt will be independent with modifications to home program.   Target Date 06/25/21   Date Met  05/19/21   Progress (detail required for progress note):     Goal Identifier 2   Goal Description Pt will decrease fullness and the tightness in the leg -.8L to sit in chair with better knee bend and get closer to the table.   Target Date 06/25/21   Date Met      Progress (detail required for progress note):     Goal Identifier 3   Goal Description Pt will get into/out of car with greater ease with less knee fluid.   Target Date 06/25/21   Date Met   (goal is not met, she is getting a quad lengthening surgery.)   Progress (detail required for progress note):     Pt was seen for one follow up and will need a follow up after the surgery Wed in about 6-8 weeks.       I CERTIFY THE NEED FOR THESE SERVICES FURNISHED UNDER        THIS PLAN OF TREATMENT AND WHILE UNDER MY CARE     (Physician co-signature of this document indicates review and certification of the therapy plan).                Referring Provider: Sepideh Guzman, OT

## 2021-07-28 ENCOUNTER — HOSPITAL ENCOUNTER (OUTPATIENT)
Facility: CLINIC | Age: 71
Discharge: HOME | End: 2021-07-29
Attending: ORTHOPAEDIC SURGERY | Admitting: ORTHOPAEDIC SURGERY
Payer: COMMERCIAL

## 2021-07-28 ENCOUNTER — APPOINTMENT (OUTPATIENT)
Dept: GENERAL RADIOLOGY | Facility: CLINIC | Age: 71
End: 2021-07-28
Attending: ORTHOPAEDIC SURGERY
Payer: COMMERCIAL

## 2021-07-28 ENCOUNTER — APPOINTMENT (OUTPATIENT)
Dept: PHYSICAL THERAPY | Facility: CLINIC | Age: 71
End: 2021-07-28
Attending: ORTHOPAEDIC SURGERY
Payer: COMMERCIAL

## 2021-07-28 ENCOUNTER — ANESTHESIA EVENT (OUTPATIENT)
Dept: SURGERY | Facility: CLINIC | Age: 71
End: 2021-07-28
Payer: COMMERCIAL

## 2021-07-28 ENCOUNTER — ANESTHESIA (OUTPATIENT)
Dept: SURGERY | Facility: CLINIC | Age: 71
End: 2021-07-28
Payer: COMMERCIAL

## 2021-07-28 DIAGNOSIS — S76.192A OTHER SPECIFIED INJURY OF LEFT QUADRICEPS MUSCLE, FASCIA AND TENDON, INITIAL ENCOUNTER: Primary | ICD-10-CM

## 2021-07-28 LAB
CREAT SERPL-MCNC: 0.7 MG/DL (ref 0.52–1.04)
GFR SERPL CREATININE-BSD FRML MDRD: 87 ML/MIN/1.73M2
HGB BLD-MCNC: 10.5 G/DL (ref 11.7–15.7)

## 2021-07-28 PROCEDURE — 250N000011 HC RX IP 250 OP 636: Performed by: PHYSICIAN ASSISTANT

## 2021-07-28 PROCEDURE — 36415 COLL VENOUS BLD VENIPUNCTURE: CPT | Performed by: NURSE ANESTHETIST, CERTIFIED REGISTERED

## 2021-07-28 PROCEDURE — 82565 ASSAY OF CREATININE: CPT | Performed by: ORTHOPAEDIC SURGERY

## 2021-07-28 PROCEDURE — 250N000009 HC RX 250: Performed by: NURSE ANESTHETIST, CERTIFIED REGISTERED

## 2021-07-28 PROCEDURE — C1713 ANCHOR/SCREW BN/BN,TIS/BN: HCPCS | Performed by: ORTHOPAEDIC SURGERY

## 2021-07-28 PROCEDURE — 999N000179 XR SURGERY CARM FLUORO LESS THAN 5 MIN W STILLS: Mod: TC

## 2021-07-28 PROCEDURE — C1769 GUIDE WIRE: HCPCS | Performed by: ORTHOPAEDIC SURGERY

## 2021-07-28 PROCEDURE — 710N000009 HC RECOVERY PHASE 1, LEVEL 1, PER MIN: Performed by: ORTHOPAEDIC SURGERY

## 2021-07-28 PROCEDURE — 99207 PR CDG-CODE CATEGORY CHANGED: CPT | Performed by: INTERNAL MEDICINE

## 2021-07-28 PROCEDURE — 999N000065 XR KNEE PORT LEFT 1/2 VIEWS: Mod: LT

## 2021-07-28 PROCEDURE — 258N000003 HC RX IP 258 OP 636: Performed by: ANESTHESIOLOGY

## 2021-07-28 PROCEDURE — 97161 PT EVAL LOW COMPLEX 20 MIN: CPT | Mod: GP

## 2021-07-28 PROCEDURE — 999N000141 HC STATISTIC PRE-PROCEDURE NURSING ASSESSMENT: Performed by: ORTHOPAEDIC SURGERY

## 2021-07-28 PROCEDURE — 370N000017 HC ANESTHESIA TECHNICAL FEE, PER MIN: Performed by: ORTHOPAEDIC SURGERY

## 2021-07-28 PROCEDURE — 250N000009 HC RX 250: Performed by: ANESTHESIOLOGY

## 2021-07-28 PROCEDURE — 258N000003 HC RX IP 258 OP 636: Performed by: ORTHOPAEDIC SURGERY

## 2021-07-28 PROCEDURE — 272N000001 HC OR GENERAL SUPPLY STERILE: Performed by: ORTHOPAEDIC SURGERY

## 2021-07-28 PROCEDURE — 99213 OFFICE O/P EST LOW 20 MIN: CPT | Performed by: INTERNAL MEDICINE

## 2021-07-28 PROCEDURE — 250N000013 HC RX MED GY IP 250 OP 250 PS 637: Performed by: ORTHOPAEDIC SURGERY

## 2021-07-28 PROCEDURE — 97116 GAIT TRAINING THERAPY: CPT | Mod: GP

## 2021-07-28 PROCEDURE — 250N000011 HC RX IP 250 OP 636: Performed by: ANESTHESIOLOGY

## 2021-07-28 PROCEDURE — 250N000013 HC RX MED GY IP 250 OP 250 PS 637: Performed by: PHYSICIAN ASSISTANT

## 2021-07-28 PROCEDURE — 97530 THERAPEUTIC ACTIVITIES: CPT | Mod: GP

## 2021-07-28 PROCEDURE — 250N000011 HC RX IP 250 OP 636: Performed by: ORTHOPAEDIC SURGERY

## 2021-07-28 PROCEDURE — 360N000083 HC SURGERY LEVEL 3 W/ FLUORO, PER MIN: Performed by: ORTHOPAEDIC SURGERY

## 2021-07-28 PROCEDURE — 85018 HEMOGLOBIN: CPT | Performed by: NURSE ANESTHETIST, CERTIFIED REGISTERED

## 2021-07-28 PROCEDURE — 250N000011 HC RX IP 250 OP 636: Performed by: NURSE ANESTHETIST, CERTIFIED REGISTERED

## 2021-07-28 DEVICE — IMP SCR SYN CAN 4.0X46MM LONG THRD SS 207.746: Type: IMPLANTABLE DEVICE | Site: KNEE | Status: FUNCTIONAL

## 2021-07-28 DEVICE — WIRE SURGICAL STEEL 18GA DS-18
Type: IMPLANTABLE DEVICE | Site: KNEE | Status: NON-FUNCTIONAL
Removed: 2022-05-31

## 2021-07-28 RX ORDER — ACETAMINOPHEN 325 MG/1
650 TABLET ORAL EVERY 4 HOURS PRN
Status: DISCONTINUED | OUTPATIENT
Start: 2021-07-31 | End: 2021-07-29 | Stop reason: HOSPADM

## 2021-07-28 RX ORDER — PROMETHAZINE HYDROCHLORIDE 25 MG/ML
25 INJECTION INTRAMUSCULAR; INTRAVENOUS ONCE
Status: COMPLETED | OUTPATIENT
Start: 2021-07-28 | End: 2021-07-28

## 2021-07-28 RX ORDER — NALOXONE HYDROCHLORIDE 0.4 MG/ML
0.2 INJECTION, SOLUTION INTRAMUSCULAR; INTRAVENOUS; SUBCUTANEOUS
Status: DISCONTINUED | OUTPATIENT
Start: 2021-07-28 | End: 2021-07-29 | Stop reason: HOSPADM

## 2021-07-28 RX ORDER — LEVOTHYROXINE SODIUM 75 UG/1
75 TABLET ORAL DAILY
Status: DISCONTINUED | OUTPATIENT
Start: 2021-07-29 | End: 2021-07-29 | Stop reason: HOSPADM

## 2021-07-28 RX ORDER — HYDROXYZINE HYDROCHLORIDE 25 MG/1
25 TABLET, FILM COATED ORAL 3 TIMES DAILY PRN
Status: DISCONTINUED | OUTPATIENT
Start: 2021-07-28 | End: 2021-07-29 | Stop reason: HOSPADM

## 2021-07-28 RX ORDER — CYCLOBENZAPRINE HCL 5 MG
5 TABLET ORAL 3 TIMES DAILY PRN
Status: DISCONTINUED | OUTPATIENT
Start: 2021-07-28 | End: 2021-07-29 | Stop reason: HOSPADM

## 2021-07-28 RX ORDER — ACETAMINOPHEN 325 MG/1
650 TABLET ORAL EVERY 4 HOURS PRN
Qty: 100 TABLET | Refills: 0 | Status: SHIPPED | OUTPATIENT
Start: 2021-07-28 | End: 2022-03-11

## 2021-07-28 RX ORDER — FENTANYL CITRATE 50 UG/ML
25 INJECTION, SOLUTION INTRAMUSCULAR; INTRAVENOUS EVERY 5 MIN PRN
Status: DISCONTINUED | OUTPATIENT
Start: 2021-07-28 | End: 2021-07-28 | Stop reason: HOSPADM

## 2021-07-28 RX ORDER — HYDROXYZINE HYDROCHLORIDE 10 MG/1
10 TABLET, FILM COATED ORAL EVERY 6 HOURS PRN
Status: DISCONTINUED | OUTPATIENT
Start: 2021-07-28 | End: 2021-07-28

## 2021-07-28 RX ORDER — FAMOTIDINE 20 MG/1
20 TABLET, FILM COATED ORAL 2 TIMES DAILY
Status: DISCONTINUED | OUTPATIENT
Start: 2021-07-28 | End: 2021-07-29 | Stop reason: HOSPADM

## 2021-07-28 RX ORDER — AMOXICILLIN 250 MG
1-2 CAPSULE ORAL 2 TIMES DAILY
Qty: 30 TABLET | Refills: 0 | Status: SHIPPED | OUTPATIENT
Start: 2021-07-28 | End: 2021-09-21

## 2021-07-28 RX ORDER — MEPERIDINE HYDROCHLORIDE 25 MG/ML
12.5 INJECTION INTRAMUSCULAR; INTRAVENOUS; SUBCUTANEOUS
Status: DISCONTINUED | OUTPATIENT
Start: 2021-07-28 | End: 2021-07-28 | Stop reason: HOSPADM

## 2021-07-28 RX ORDER — EZETIMIBE 10 MG/1
10 TABLET ORAL DAILY
Status: DISCONTINUED | OUTPATIENT
Start: 2021-07-29 | End: 2021-07-29 | Stop reason: HOSPADM

## 2021-07-28 RX ORDER — GLYCOPYRROLATE 0.2 MG/ML
INJECTION, SOLUTION INTRAMUSCULAR; INTRAVENOUS PRN
Status: DISCONTINUED | OUTPATIENT
Start: 2021-07-28 | End: 2021-07-28

## 2021-07-28 RX ORDER — ONDANSETRON 2 MG/ML
INJECTION INTRAMUSCULAR; INTRAVENOUS PRN
Status: DISCONTINUED | OUTPATIENT
Start: 2021-07-28 | End: 2021-07-28

## 2021-07-28 RX ORDER — HYDROMORPHONE HCL IN WATER/PF 6 MG/30 ML
PATIENT CONTROLLED ANALGESIA SYRINGE INTRAVENOUS
Status: DISPENSED
Start: 2021-07-28 | End: 2021-07-29

## 2021-07-28 RX ORDER — ONDANSETRON 2 MG/ML
4 INJECTION INTRAMUSCULAR; INTRAVENOUS EVERY 30 MIN PRN
Status: DISCONTINUED | OUTPATIENT
Start: 2021-07-28 | End: 2021-07-28 | Stop reason: HOSPADM

## 2021-07-28 RX ORDER — ONDANSETRON 4 MG/1
4 TABLET, ORALLY DISINTEGRATING ORAL EVERY 30 MIN PRN
Status: DISCONTINUED | OUTPATIENT
Start: 2021-07-28 | End: 2021-07-28 | Stop reason: HOSPADM

## 2021-07-28 RX ORDER — LIDOCAINE 40 MG/G
CREAM TOPICAL
Status: DISCONTINUED | OUTPATIENT
Start: 2021-07-28 | End: 2021-07-29 | Stop reason: HOSPADM

## 2021-07-28 RX ORDER — BISACODYL 10 MG
10 SUPPOSITORY, RECTAL RECTAL DAILY PRN
Status: DISCONTINUED | OUTPATIENT
Start: 2021-07-28 | End: 2021-07-29 | Stop reason: HOSPADM

## 2021-07-28 RX ORDER — OXYCODONE HYDROCHLORIDE 5 MG/1
5-10 TABLET ORAL
Qty: 60 TABLET | Refills: 0 | Status: SHIPPED | OUTPATIENT
Start: 2021-07-28 | End: 2021-09-21

## 2021-07-28 RX ORDER — ONDANSETRON 4 MG/1
4 TABLET, ORALLY DISINTEGRATING ORAL EVERY 6 HOURS PRN
Status: DISCONTINUED | OUTPATIENT
Start: 2021-07-28 | End: 2021-07-29 | Stop reason: HOSPADM

## 2021-07-28 RX ORDER — HYDRALAZINE HYDROCHLORIDE 20 MG/ML
2.5-5 INJECTION INTRAMUSCULAR; INTRAVENOUS EVERY 10 MIN PRN
Status: DISCONTINUED | OUTPATIENT
Start: 2021-07-28 | End: 2021-07-28 | Stop reason: HOSPADM

## 2021-07-28 RX ORDER — NALOXONE HYDROCHLORIDE 0.4 MG/ML
0.4 INJECTION, SOLUTION INTRAMUSCULAR; INTRAVENOUS; SUBCUTANEOUS
Status: DISCONTINUED | OUTPATIENT
Start: 2021-07-28 | End: 2021-07-29 | Stop reason: HOSPADM

## 2021-07-28 RX ORDER — NEOSTIGMINE METHYLSULFATE 1 MG/ML
VIAL (ML) INJECTION PRN
Status: DISCONTINUED | OUTPATIENT
Start: 2021-07-28 | End: 2021-07-28

## 2021-07-28 RX ORDER — AMOXICILLIN 250 MG
1 CAPSULE ORAL 2 TIMES DAILY
Status: DISCONTINUED | OUTPATIENT
Start: 2021-07-28 | End: 2021-07-29 | Stop reason: HOSPADM

## 2021-07-28 RX ORDER — HYDROMORPHONE HCL IN WATER/PF 6 MG/30 ML
0.2 PATIENT CONTROLLED ANALGESIA SYRINGE INTRAVENOUS
Status: DISCONTINUED | OUTPATIENT
Start: 2021-07-28 | End: 2021-07-29 | Stop reason: HOSPADM

## 2021-07-28 RX ORDER — IBUPROFEN 600 MG/1
600 TABLET, FILM COATED ORAL EVERY 6 HOURS PRN
Status: DISCONTINUED | OUTPATIENT
Start: 2021-07-28 | End: 2021-07-29 | Stop reason: HOSPADM

## 2021-07-28 RX ORDER — ACETAMINOPHEN 325 MG/1
975 TABLET ORAL EVERY 8 HOURS
Status: DISCONTINUED | OUTPATIENT
Start: 2021-07-28 | End: 2021-07-29 | Stop reason: HOSPADM

## 2021-07-28 RX ORDER — ONDANSETRON 2 MG/ML
4 INJECTION INTRAMUSCULAR; INTRAVENOUS EVERY 6 HOURS PRN
Status: DISCONTINUED | OUTPATIENT
Start: 2021-07-28 | End: 2021-07-29 | Stop reason: HOSPADM

## 2021-07-28 RX ORDER — TOLTERODINE 2 MG/1
2 CAPSULE, EXTENDED RELEASE ORAL DAILY
Refills: 3 | Status: DISCONTINUED | OUTPATIENT
Start: 2021-07-29 | End: 2021-07-29 | Stop reason: HOSPADM

## 2021-07-28 RX ORDER — EPHEDRINE SULFATE 50 MG/ML
25 INJECTION, SOLUTION INTRAVENOUS ONCE
Status: COMPLETED | OUTPATIENT
Start: 2021-07-28 | End: 2021-07-28

## 2021-07-28 RX ORDER — PROPOFOL 10 MG/ML
INJECTION, EMULSION INTRAVENOUS PRN
Status: DISCONTINUED | OUTPATIENT
Start: 2021-07-28 | End: 2021-07-28

## 2021-07-28 RX ORDER — VITAMIN B COMPLEX
50 TABLET ORAL DAILY
Status: DISCONTINUED | OUTPATIENT
Start: 2021-07-29 | End: 2021-07-29 | Stop reason: HOSPADM

## 2021-07-28 RX ORDER — LIDOCAINE 40 MG/G
CREAM TOPICAL
Status: DISCONTINUED | OUTPATIENT
Start: 2021-07-28 | End: 2021-07-28 | Stop reason: HOSPADM

## 2021-07-28 RX ORDER — OXYCODONE HYDROCHLORIDE 5 MG/1
5 TABLET ORAL EVERY 4 HOURS PRN
Status: DISCONTINUED | OUTPATIENT
Start: 2021-07-28 | End: 2021-07-29 | Stop reason: HOSPADM

## 2021-07-28 RX ORDER — HYDROMORPHONE HCL IN WATER/PF 6 MG/30 ML
0.2 PATIENT CONTROLLED ANALGESIA SYRINGE INTRAVENOUS EVERY 5 MIN PRN
Status: DISCONTINUED | OUTPATIENT
Start: 2021-07-28 | End: 2021-07-28 | Stop reason: HOSPADM

## 2021-07-28 RX ORDER — ONDANSETRON 2 MG/ML
4 INJECTION INTRAMUSCULAR; INTRAVENOUS EVERY 6 HOURS
Status: DISPENSED | OUTPATIENT
Start: 2021-07-28 | End: 2021-07-29

## 2021-07-28 RX ORDER — OXYCODONE HYDROCHLORIDE 5 MG/1
10 TABLET ORAL EVERY 4 HOURS PRN
Status: DISCONTINUED | OUTPATIENT
Start: 2021-07-28 | End: 2021-07-29 | Stop reason: HOSPADM

## 2021-07-28 RX ORDER — CEFAZOLIN SODIUM 2 G/100ML
2 INJECTION, SOLUTION INTRAVENOUS
Status: COMPLETED | OUTPATIENT
Start: 2021-07-28 | End: 2021-07-28

## 2021-07-28 RX ORDER — PROCHLORPERAZINE MALEATE 5 MG
5 TABLET ORAL EVERY 6 HOURS PRN
Status: DISCONTINUED | OUTPATIENT
Start: 2021-07-28 | End: 2021-07-29 | Stop reason: HOSPADM

## 2021-07-28 RX ORDER — SODIUM CHLORIDE, SODIUM LACTATE, POTASSIUM CHLORIDE, CALCIUM CHLORIDE 600; 310; 30; 20 MG/100ML; MG/100ML; MG/100ML; MG/100ML
INJECTION, SOLUTION INTRAVENOUS CONTINUOUS
Status: DISCONTINUED | OUTPATIENT
Start: 2021-07-28 | End: 2021-07-28 | Stop reason: HOSPADM

## 2021-07-28 RX ORDER — POLYETHYLENE GLYCOL 3350 17 G/17G
17 POWDER, FOR SOLUTION ORAL DAILY
Status: DISCONTINUED | OUTPATIENT
Start: 2021-07-29 | End: 2021-07-29 | Stop reason: HOSPADM

## 2021-07-28 RX ORDER — ROSUVASTATIN CALCIUM 20 MG/1
20 TABLET, COATED ORAL DAILY
Status: DISCONTINUED | OUTPATIENT
Start: 2021-07-29 | End: 2021-07-29 | Stop reason: HOSPADM

## 2021-07-28 RX ORDER — FENTANYL CITRATE 50 UG/ML
INJECTION, SOLUTION INTRAMUSCULAR; INTRAVENOUS PRN
Status: DISCONTINUED | OUTPATIENT
Start: 2021-07-28 | End: 2021-07-28

## 2021-07-28 RX ORDER — SODIUM CHLORIDE, SODIUM LACTATE, POTASSIUM CHLORIDE, CALCIUM CHLORIDE 600; 310; 30; 20 MG/100ML; MG/100ML; MG/100ML; MG/100ML
INJECTION, SOLUTION INTRAVENOUS CONTINUOUS
Status: DISCONTINUED | OUTPATIENT
Start: 2021-07-28 | End: 2021-07-29 | Stop reason: HOSPADM

## 2021-07-28 RX ORDER — TRANEXAMIC ACID 650 MG/1
1950 TABLET ORAL ONCE
Status: COMPLETED | OUTPATIENT
Start: 2021-07-28 | End: 2021-07-28

## 2021-07-28 RX ORDER — LIDOCAINE HYDROCHLORIDE 10 MG/ML
INJECTION, SOLUTION INFILTRATION; PERINEURAL PRN
Status: DISCONTINUED | OUTPATIENT
Start: 2021-07-28 | End: 2021-07-28

## 2021-07-28 RX ORDER — CEFAZOLIN SODIUM 1 G/3ML
1 INJECTION, POWDER, FOR SOLUTION INTRAMUSCULAR; INTRAVENOUS EVERY 8 HOURS
Status: COMPLETED | OUTPATIENT
Start: 2021-07-28 | End: 2021-07-29

## 2021-07-28 RX ORDER — CEFAZOLIN SODIUM 2 G/100ML
2 INJECTION, SOLUTION INTRAVENOUS SEE ADMIN INSTRUCTIONS
Status: DISCONTINUED | OUTPATIENT
Start: 2021-07-28 | End: 2021-07-28 | Stop reason: HOSPADM

## 2021-07-28 RX ORDER — DEXAMETHASONE SODIUM PHOSPHATE 4 MG/ML
INJECTION, SOLUTION INTRA-ARTICULAR; INTRALESIONAL; INTRAMUSCULAR; INTRAVENOUS; SOFT TISSUE PRN
Status: DISCONTINUED | OUTPATIENT
Start: 2021-07-28 | End: 2021-07-28

## 2021-07-28 RX ORDER — CELECOXIB 200 MG/1
400 CAPSULE ORAL ONCE
Status: COMPLETED | OUTPATIENT
Start: 2021-07-28 | End: 2021-07-28

## 2021-07-28 RX ORDER — HYDROMORPHONE HCL IN WATER/PF 6 MG/30 ML
0.4 PATIENT CONTROLLED ANALGESIA SYRINGE INTRAVENOUS
Status: DISCONTINUED | OUTPATIENT
Start: 2021-07-28 | End: 2021-07-29 | Stop reason: HOSPADM

## 2021-07-28 RX ORDER — FENTANYL CITRATE 50 UG/ML
50 INJECTION, SOLUTION INTRAMUSCULAR; INTRAVENOUS EVERY 5 MIN PRN
Status: DISCONTINUED | OUTPATIENT
Start: 2021-07-28 | End: 2021-07-28 | Stop reason: HOSPADM

## 2021-07-28 RX ADMIN — FENTANYL CITRATE 25 MCG: 50 INJECTION, SOLUTION INTRAMUSCULAR; INTRAVENOUS at 09:38

## 2021-07-28 RX ADMIN — HYDROMORPHONE HYDROCHLORIDE 0.2 MG: 0.2 INJECTION, SOLUTION INTRAMUSCULAR; INTRAVENOUS; SUBCUTANEOUS at 14:24

## 2021-07-28 RX ADMIN — OXYCODONE HYDROCHLORIDE 10 MG: 5 TABLET ORAL at 13:13

## 2021-07-28 RX ADMIN — LIDOCAINE HYDROCHLORIDE 25 MG: 10 INJECTION, SOLUTION INFILTRATION; PERINEURAL at 07:29

## 2021-07-28 RX ADMIN — CELECOXIB 400 MG: 200 CAPSULE ORAL at 06:13

## 2021-07-28 RX ADMIN — EPHEDRINE SULFATE 25 MG: 50 INJECTION INTRAVENOUS at 09:58

## 2021-07-28 RX ADMIN — FENTANYL CITRATE 75 MCG: 50 INJECTION, SOLUTION INTRAMUSCULAR; INTRAVENOUS at 07:29

## 2021-07-28 RX ADMIN — TRANEXAMIC ACID 1950 MG: 650 TABLET ORAL at 06:13

## 2021-07-28 RX ADMIN — SODIUM CHLORIDE, POTASSIUM CHLORIDE, SODIUM LACTATE AND CALCIUM CHLORIDE: 600; 310; 30; 20 INJECTION, SOLUTION INTRAVENOUS at 15:00

## 2021-07-28 RX ADMIN — FENTANYL CITRATE 25 MCG: 50 INJECTION, SOLUTION INTRAMUSCULAR; INTRAVENOUS at 07:44

## 2021-07-28 RX ADMIN — NEOSTIGMINE METHYLSULFATE 3 MG: 1 INJECTION, SOLUTION INTRAVENOUS at 09:20

## 2021-07-28 RX ADMIN — PROMETHAZINE HYDROCHLORIDE 25 MG: 25 INJECTION INTRAMUSCULAR; INTRAVENOUS at 09:58

## 2021-07-28 RX ADMIN — MIDAZOLAM 1 MG: 1 INJECTION INTRAMUSCULAR; INTRAVENOUS at 07:25

## 2021-07-28 RX ADMIN — SODIUM CHLORIDE 1000 ML: 9 INJECTION, SOLUTION INTRAVENOUS at 12:38

## 2021-07-28 RX ADMIN — GLYCOPYRROLATE 0.6 MG: 0.2 INJECTION, SOLUTION INTRAMUSCULAR; INTRAVENOUS at 09:20

## 2021-07-28 RX ADMIN — DEXAMETHASONE SODIUM PHOSPHATE 4 MG: 4 INJECTION, SOLUTION INTRA-ARTICULAR; INTRALESIONAL; INTRAMUSCULAR; INTRAVENOUS; SOFT TISSUE at 07:29

## 2021-07-28 RX ADMIN — ROCURONIUM BROMIDE 30 MG: 10 INJECTION INTRAVENOUS at 07:29

## 2021-07-28 RX ADMIN — CEFAZOLIN 1 G: 1 INJECTION, POWDER, FOR SOLUTION INTRAMUSCULAR; INTRAVENOUS at 16:37

## 2021-07-28 RX ADMIN — DOCUSATE SODIUM AND SENNOSIDES 1 TABLET: 8.6; 5 TABLET, FILM COATED ORAL at 20:03

## 2021-07-28 RX ADMIN — ONDANSETRON 4 MG: 2 INJECTION INTRAMUSCULAR; INTRAVENOUS at 13:09

## 2021-07-28 RX ADMIN — HYDROMORPHONE HYDROCHLORIDE 0.2 MG: 0.2 INJECTION, SOLUTION INTRAMUSCULAR; INTRAVENOUS; SUBCUTANEOUS at 12:35

## 2021-07-28 RX ADMIN — HYDROMORPHONE HYDROCHLORIDE 0.5 MG: 1 INJECTION, SOLUTION INTRAMUSCULAR; INTRAVENOUS; SUBCUTANEOUS at 08:48

## 2021-07-28 RX ADMIN — ONDANSETRON 4 MG: 2 INJECTION INTRAMUSCULAR; INTRAVENOUS at 17:50

## 2021-07-28 RX ADMIN — CEFAZOLIN SODIUM 2 G: 2 INJECTION, SOLUTION INTRAVENOUS at 07:25

## 2021-07-28 RX ADMIN — PROPOFOL 120 MG: 10 INJECTION, EMULSION INTRAVENOUS at 07:29

## 2021-07-28 RX ADMIN — ONDANSETRON 4 MG: 2 INJECTION INTRAMUSCULAR; INTRAVENOUS at 09:41

## 2021-07-28 RX ADMIN — ACETAMINOPHEN 975 MG: 325 TABLET, FILM COATED ORAL at 13:12

## 2021-07-28 RX ADMIN — HYDROXYZINE HYDROCHLORIDE 25 MG: 25 TABLET, FILM COATED ORAL at 14:24

## 2021-07-28 RX ADMIN — FAMOTIDINE 20 MG: 20 TABLET ORAL at 20:03

## 2021-07-28 RX ADMIN — SODIUM CHLORIDE, POTASSIUM CHLORIDE, SODIUM LACTATE AND CALCIUM CHLORIDE: 600; 310; 30; 20 INJECTION, SOLUTION INTRAVENOUS at 07:25

## 2021-07-28 RX ADMIN — FENTANYL CITRATE 25 MCG: 50 INJECTION, SOLUTION INTRAMUSCULAR; INTRAVENOUS at 09:43

## 2021-07-28 RX ADMIN — OXYCODONE HYDROCHLORIDE 10 MG: 5 TABLET ORAL at 17:50

## 2021-07-28 RX ADMIN — ACETAMINOPHEN 975 MG: 325 TABLET, FILM COATED ORAL at 20:03

## 2021-07-28 RX ADMIN — HYDROMORPHONE HYDROCHLORIDE 0.5 MG: 1 INJECTION, SOLUTION INTRAMUSCULAR; INTRAVENOUS; SUBCUTANEOUS at 08:18

## 2021-07-28 RX ADMIN — ONDANSETRON HYDROCHLORIDE 4 MG: 2 INJECTION, SOLUTION INTRAVENOUS at 07:44

## 2021-07-28 ASSESSMENT — ACTIVITIES OF DAILY LIVING (ADL)
PATIENT_/_FAMILY_COMMUNICATION_STYLE: SPOKEN LANGUAGE (ENGLISH OR BILINGUAL)
CONCENTRATING,_REMEMBERING_OR_MAKING_DECISIONS_DIFFICULTY: NO
HEARING_DIFFICULTY_OR_DEAF: NO
TOILETING_ISSUES: NO
WALKING_OR_CLIMBING_STAIRS: AMBULATION DIFFICULTY, REQUIRES EQUIPMENT
VISION_MANAGEMENT: GLASSES HERE
WALKING_OR_CLIMBING_STAIRS_DIFFICULTY: YES
WEAR_GLASSES_OR_BLIND: YES
EQUIPMENT_CURRENTLY_USED_AT_HOME: RAISED TOILET SEAT
DESCRIBE_HEARING_LOSS: BILATERAL HEARING LOSS
FALL_HISTORY_WITHIN_LAST_SIX_MONTHS: NO
WALKING_OR_CLIMBING_STAIRS_DIFFICULTY: YES
HEARING_DIFFICULTY_OR_DEAF: NO
DIFFICULTY_COMMUNICATING: NO
WHICH_OF_THE_ABOVE_FUNCTIONAL_RISKS_HAD_A_RECENT_ONSET_OR_CHANGE?: AMBULATION
DIFFICULTY_EATING/SWALLOWING: NO
DOING_ERRANDS_INDEPENDENTLY_DIFFICULTY: NO
DRESSING/BATHING_DIFFICULTY: NO
WALKING_OR_CLIMBING_STAIRS: AMBULATION DIFFICULTY, REQUIRES EQUIPMENT
CONCENTRATING,_REMEMBERING_OR_MAKING_DECISIONS_DIFFICULTY: NO
WEAR_GLASSES_OR_BLIND: YES
DIFFICULTY_EATING/SWALLOWING: NO
DIFFICULTY_COMMUNICATING: NO
TOILETING_ISSUES: NO
NUMBER_OF_TIMES_PATIENT_HAS_FALLEN_WITHIN_LAST_SIX_MONTHS: 0
FALL_HISTORY_WITHIN_LAST_SIX_MONTHS: NO
NUMBER_OF_TIMES_PATIENT_HAS_FALLEN_WITHIN_LAST_SIX_MONTHS: 0
WEAR_GLASSES_OR_BLIND: YES
DRESSING/BATHING_DIFFICULTY: NO
WHICH_OF_THE_ABOVE_FUNCTIONAL_RISKS_HAD_A_RECENT_ONSET_OR_CHANGE?: AMBULATION

## 2021-07-28 ASSESSMENT — MIFFLIN-ST. JEOR: SCORE: 1175.86

## 2021-07-28 NOTE — OP NOTE
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    POSTOPERATIVE DIAGNOSES:    1.  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    2.  Intraoperative patellar fracture.      PROCEDURE:  Left knee quadricepsplasty with intraoperative patellar fracture requiring open reduction and internal fixation of the patella.    SURGEON:  Spencer Celeste MD    FIRST ASSISTANT:  Christine Alex PA-C.    INDICATIONS FOR PROCEDURE:  Ms. Jacobson is a very pleasant 71-year-old female who had a left knee injury last June just over a year ago, where she had a quadriceps tendon rupture and a midshaft tibia fracture.  The tibia fracture was treated with open reduction and internal fixation.  The quadriceps tendon rupture was treated with open quad tendon repair.  This was all done by my partner Stephen Rhodes.  She had a really good result with this, healed the tibia fracture nicely, healed the quadriceps tendon nicely, but had persistent knee stiffness, particularly that she can only flex to about 50-60 degrees despite aggressive physical therapy.  She felt that she was fairly stuck in her quadriceps and patella quite sucked in due to scar tissue and I felt that a progressive release and quadricepsplasty would be useful to gain flexion.  We discussed treatment options.  She understands the risks, benefits and alternatives, particularly the risk of wound healing problems, neurovascular injury, persistent stiffness and fracture.    NARRATIVE EVENTS:  After thorough preoperative evaluation and proper identification of the patient and extremity to be operated on, Ms. Jacobson was taken to the operating room where she underwent general anesthetic and was placed supine on the operating table.  The left leg was prepped and draped in the usual sterile manner.  After an appropriate surgical pause confirming the patient's extremity to be  operated on, 10 minutes after she received 2 grams of Ancef, the left leg was prepped and draped in the usual sterile manner, her left leg was exsanguinated and tourniquet was raised to 300 mmHg on the left upper thigh.  Approached the left knee through a midline incision centered over the patella.  Skin and soft tissue was sharply dissected down to the patella.  Here, the skin was fairly adherent to the underlying soft tissue.  We released this and doing so, we gained about 5-10 degrees of flexion.  Prior to surgery, we had measured her flexion passively under anesthesia and she was passively flexing to only about 35-40 degrees.  At this point, we were able to get to about 45-50 degrees.  At this point, we performed a small medial parapatellar arthrotomy.  We found that the proximal patella was fairly scarred down to the distal femur along with the distal end of the quadriceps tendon.  This was released.  We performed a moderate synovectomy of the suprapatellar pouch.  This gained us another 10-15 degrees of flexion.  At this point, we then were able to release some of the IT band, which gave us probably another 5 degrees of flexion.  So at this point, we were up to about 75-80 degrees of flexion.  At this point, I took a Botello elevator and released any scar tissue from between the quadriceps tendon and the anterior femur.  Once this was released, we had about 85 degrees of flexion.  As I continued to try to flex the knee at this point to determine limits of flexion, we felt a pop at the knee and realized that she had fractured through the proximal pole of her patella, through one of the sites of her suture anchors for her quad tendon repair.  We removed the suture anchor.  We were able to reduce the proximal pole of the patella, although this was somewhat comminuted.  We decided at this point we should cease with the releases and fix the patellar tendon and the quadriceps.  Not knowing of the patella fracture would  heal given the amount of comminution, I elected to do somewhat of a belt and suspenders repair, initially reduced the fracture and placed two 4-0 cannulated screws from superior to inferior across the patella, where it was able to somewhat reduce the fracture nicely.  Then through the 4-0 screws, we ran an 18-gauge wire in a figure-of-eight tension band fashion and tensioned this, which reduced the fracture fragments quite nicely.  At this point, we then drilled 3 holes, 1 on either side of the 2 screws that ran from anterior to posterior and placed two #2 [place] stitches in the distal quadriceps tendon, such that the 4 strands of the two sutures were exiting out the distal end of the quadriceps tendon just proximal to the proximal pole of the patella.  We then passed these sutures through the bone tunnels within the patella, such that we were able to tie them over bone bridges at the distal end of the patella.  We tensioned the sutures and tied them tightly over the distal pole of the patella.  At this point, we checked for range of motion and the fracture reduction.  We felt that we could range the patient from 0-45 without really stressing the fracture or the quad tendon repair, so the decision was made that we would repair the median parapatellar arthrotomy and we would allow the patient to begin early range of motion 0-30 degrees protected with a hinged knee brace postoperatively and then progress as quickly as the patient tolerates and as the patient is healing.  At this point, we then closed the arthrotomy with interrupted #5 Ethibond sutures and interrupted 0 Vicryl sutures and we closed the skin and soft tissue with absorbable sutures and staples in the skin.  The patient was placed in a well-padded postop dressing as well as a hinged knee brace.      She was taken to recovery room in stable condition, having tolerated the procedure without difficulty.    Spencer Celeste MD        D: 07/28/2021    T: 2021   MT: ALFONSO    Name:     DIANA ELAINE  MRN:      [id number]        Account:        [id number]   :      1950           Procedure Date: 2021     Document: [id number]

## 2021-07-28 NOTE — ANESTHESIA POSTPROCEDURE EVALUATION
Patient: Angeli Jacobson    Procedure(s):  Left knee quadriceplasty    Diagnosis:Arthrofibrosis of knee joint, left [M24.662]  Diagnosis Additional Information: No value filed.    Anesthesia Type:  General    Note:  Disposition: Outpatient   Postop Pain Control: Uneventful            Sign Out: Well controlled pain   PONV: No   Neuro/Psych: Uneventful            Sign Out: Acceptable/Baseline neuro status   Airway/Respiratory: Uneventful            Sign Out: Acceptable/Baseline resp. status   CV/Hemodynamics: Uneventful            Sign Out: Acceptable CV status; No obvious hypovolemia; No obvious fluid overload   Other NRE: NONE   DID A NON-ROUTINE EVENT OCCUR? No           Last vitals:  Vitals Value Taken Time   /61 07/28/21 1130   Temp 97.4  F (36.3  C) 07/28/21 1100   Pulse 71 07/28/21 1133   Resp 10 07/28/21 1133   SpO2 96 % 07/28/21 1133   Vitals shown include unvalidated device data.    Electronically Signed By: Jesus Reece MD  July 28, 2021  12:58 PM

## 2021-07-28 NOTE — ANESTHESIA CARE TRANSFER NOTE
Patient: Angeli Jacobson    Procedure(s):  Left knee quadriceplasty    Diagnosis: Arthrofibrosis of knee joint, left [M24.662]  Diagnosis Additional Information: No value filed.    Anesthesia Type:   General     Note:    Oropharynx: oropharynx clear of all foreign objects and spontaneously breathing  Level of Consciousness: drowsy  Oxygen Supplementation: face mask  Level of Supplemental Oxygen (L/min / FiO2): 6  Independent Airway: airway patency satisfactory and stable  Dentition: dentition unchanged  Vital Signs Stable: post-procedure vital signs reviewed and stable  Report to RN Given: handoff report given  Patient transferred to: PACU    Handoff Report: Identifed the Patient, Identified the Reponsible Provider, Reviewed the pertinent medical history, Discussed the surgical course, Reviewed Intra-OP anesthesia mangement and issues during anesthesia, Set expectations for post-procedure period and Allowed opportunity for questions and acknowledgement of understanding      Vitals:  Vitals Value Taken Time   /76 07/28/21 0935   Temp 96.8  F (36  C) 07/28/21 0930   Pulse 81 07/28/21 0938   Resp 12 07/28/21 0938   SpO2 94 % 07/28/21 0938   Vitals shown include unvalidated device data.    Electronically Signed By: ELVA Hernandez CRNA  July 28, 2021  9:39 AM

## 2021-07-28 NOTE — PROGRESS NOTES
"SPIRITUAL HEALTH SERVICES Progress Note  [facility] Pre-surgical     consult per pt request. Met with pt, Angeli, and her spouse, Chris.   Angeli shares she is \"nervous\" about her knee surgery and invites prayers. [facility] [place]. We shared in prayer. No other needs expressed. [facility] remains available.     LOAN Galeano.  Staff     Office [phone]  Pronouns: he/him/his    "

## 2021-07-28 NOTE — ANESTHESIA PREPROCEDURE EVALUATION
Anesthesia Pre-Procedure Evaluation    Patient: Angeli Jacobson   MRN: [id number] : 1950        Preoperative Diagnosis: Arthrofibrosis of knee joint, left [M24.662]   Procedure : Procedure(s):  Left knee quadriceplasty     Past Medical History:   Diagnosis Date     * * * SBE PROPHYLAXIS * * *     Amox 500mg, take 4 tabs one hour prior to procedure.Takes this because of lymphedema secondary from leg surgey     Central serous retinopathy     Resolved 2001     CHRONIC NECK PAIN      Depressive disorder      Depressive disorder, not elsewhere classified      History of blood transfusion 2019    during left hip pinning, during surgery for patella     MIXED HYPERLIPIDEMIA, LDL GOAL <160     LDL goal < 160     Motion sickness      MYXOID LIPOSARCOMA 2000    Left thigh, S/P excision, radiation  at [facility]     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    CHRONIC LEFT THIGH LYMPHEDEMA     Nontoxic multinodular goiter 2005    needs yearly US     Osteoporosis, unspecified      Other chronic pain     fx ribs left      Other lymphedema     left thigh, gets regular PT for this     PAD (peripheral artery disease) (H) 2018     PONV (postoperative nausea and vomiting)      SHINGLES      Sprain of lumbosacral (joint) (ligament)     right     Unspecified hearing loss     chronic tinnitus     Unspecified tinnitus       Past Surgical History:   Procedure Laterality Date     ARTHROPLASTY HIP Left 10/4/2019    Procedure: Removal of left femoral hardware with a conversion to left total hip arthroplasty using a Biomet Annalisa femoral stem with an OsseoTi acetabular shell and a dual mobility bearing surface;  Surgeon: Spencer Celeste MD;  Location: [facility] OR     BYPASS GRAFT FEMOROPOPLITEAL Left 2019    Procedure: LEFT FEMORAL TO ABOVE KNEE POPLITEAL  BYPASS WITH POLYTETRAFLUOROETHYLENE GRAFT;  Surgeon: Shade Owens MD;  Location: [facility] OR     C APPENDECTOMY       Appendectomy     COLONOSCOPY N/A 2/5/2016    Procedure: COMBINED COLONOSCOPY, SINGLE OR MULTIPLE BIOPSY/POLYPECTOMY BY BIOPSY;  Surgeon: Varun Stanley MD, MD;  Location:  GI     CYSTOSCOPY       EXCISION MALIG LESION>1.25CM  5/2000    Myxoid Liposarcoma       EXPLORE GROIN Right 5/1/2018    Procedure: EXPLORE GROIN;  EMERGENCY RIGHT FEMORAL EXPLORATION WITH FEMORAL ARTERY REPAIR.    EBL: 50mL;  Surgeon: Shade Owens MD;  Location: [facility] OR     HC COLP CERVIX/UPPER VAGINA  07/1997    Negative     HC DILATION/CURETTAGE DIAG/THER NON OB  02/1997    Post menopausal bleeding on HRT, negative     HIP SURGERY Left 04/13/2019     IR ANGIOGRAM THROUGH CATHETER FOLLOW UP  12/20/2018     IR ANGIOGRAM THROUGH CATHETER FOLLOW UP  12/21/2018     IR LOWER EXTREMITY ANGIOGRAM LEFT  12/19/2018     OPEN REDUCTION INTERNAL FIXATION PATELLA Left 12/21/2020    Procedure: Left partial patella fracture excision with advancement of the quadriceps tendon;  Surgeon: Stephen Rhodes MD;  Location:  OR     OPEN REDUCTION INTERNAL FIXATION RODDING INTRAMEDULLARY TIBIA Left 12/21/2020    Procedure: Open reduction intramedullary nailing of left tibia fracture;  Surgeon: Stephen Rhodes MD;  Location:  OR     VASCULAR SURGERY  04/30/2018    Left SFA stent in bypass graft     ZZC NONSPECIFIC PROCEDURE  04/2000    Open Biopsy Left Thigh Liposarcoma     ZZHC COLONOSCOPY THRU STOMA, DIAGNOSTIC  2006    due 2010      Allergies   Allergen Reactions     Celexa [Citalopram Hydrobromide]      Decreased libido      Social History     Tobacco Use     Smoking status: Never Smoker     Smokeless tobacco: Never Used   Substance Use Topics     Alcohol use: No      Wt Readings from Last 1 Encounters:   07/28/21 64.4 kg (142 lb)        Anesthesia Evaluation   Pt has had prior anesthetic.     History of anesthetic complications  - PONV.      ROS/MED HX  ENT/Pulmonary:       Neurologic: Comment: vertigo      Cardiovascular:     (+) Dyslipidemia  -Peripheral Vascular Disease----    METS/Exercise Tolerance:     Hematologic:       Musculoskeletal:       GI/Hepatic:       Renal/Genitourinary:       Endo:       Psychiatric/Substance Use:     (+) psychiatric history depression     Infectious Disease:       Malignancy:       Other:      (+) , H/O Chronic Pain,        Physical Exam    Airway        Mallampati: II   TM distance: > 3 FB   Neck ROM: full   Mouth opening: > 3 cm    Respiratory Devices and Support         Dental           Cardiovascular             Pulmonary                   OUTSIDE LABS:  CBC:   Lab Results   Component Value Date    WBC 6.2 07/21/2021    WBC 5.6 05/17/2021    HGB 13.7 07/21/2021    HGB 13.2 05/17/2021    HCT 42.2 07/21/2021    HCT 41.7 05/17/2021     07/21/2021     05/17/2021     BMP:   Lab Results   Component Value Date     05/17/2021     12/20/2020    POTASSIUM 4.3 05/17/2021    POTASSIUM 4.1 12/20/2020    CHLORIDE 107 05/17/2021    CHLORIDE 107 12/20/2020    CO2 32 05/17/2021    CO2 27 12/20/2020    BUN 13 05/17/2021    BUN 14 12/20/2020    CR 0.75 05/17/2021    CR 0.60 12/20/2020    GLC 78 05/17/2021     (H) 12/23/2020     COAGS:   Lab Results   Component Value Date    PTT 27 12/19/2018    INR 1.01 09/27/2019     POC:   Lab Results   Component Value Date     (H) 01/23/2019     HEPATIC:   Lab Results   Component Value Date    ALBUMIN 3.6 05/17/2021    PROTTOTAL 7.6 05/17/2021    ALT 26 05/17/2021    AST 20 05/17/2021    ALKPHOS 88 05/17/2021    BILITOTAL 0.4 05/17/2021     OTHER:   Lab Results   Component Value Date    A1C 5.5 01/17/2019    LONG 9.3 05/17/2021    MAG 1.8 05/01/2018    TSH 4.19 (H) 05/17/2021    T4 0.92 05/17/2021    CRP 3.0 06/06/2006    SED 62 (H) 05/16/2006       Anesthesia Plan    ASA Status:  3   NPO Status:  NPO Appropriate    Anesthesia Type: General.     - Airway: ETT   Induction: Intravenous, Propofol.   Maintenance: Balanced.        Consents    Anesthesia Plan(s) and  associated risks, benefits, and realistic alternatives discussed. Questions answered and patient/representative(s) expressed understanding.     - Discussed with:  Patient         Postoperative Care    Pain management: IV analgesics.   PONV prophylaxis: Ondansetron (or other 5HT-3), Dexamethasone or Solumedrol     Comments:                eJsus Reece MD

## 2021-07-28 NOTE — PLAN OF CARE
Pt arrived from pacu on a cart, assisted into bed with air taryn. IV infusing into left arm, O2 on at 2L NC piggybacked into capno tubing and readings wnl. Ace wrap clean and dry with hinged brace in place over the ace. Pt has some numbness in her left foot which was present preop and hasn't changed o/w cms intact, foot is swollen compared to right foot but this is chronic as well. PCD on right calf. Very sleepy on arrival but after an hour on the floor c/o severe pain, medicated with IV Dilaudid then po tylenol and oxycodone with only slight improvement then another dose of IV dilaudid given with po vistaril with a little more improvement. Up to bedside commode with walker and assist of 2 but unable to void yet. Frequent VS completed and pt doing her postop exercises after each check.  Oriented to room, equipment, orders and routines but pt stated she remembered how to use them all. CPM ordered but not delivered yet. Unsure of discharge plan yet.

## 2021-07-28 NOTE — BRIEF OP NOTE
[facility]    Brief Operative Note    Pre-operative diagnosis: Arthrofibrosis of knee joint, left [M24.662]  Post-operative diagnosis Same as pre-operative diagnosis   Left intra-operative patella fracture    Procedure: Procedure(s):  Left knee quadriceplasty   Left intraoperative patella fracture ORIF  Surgeon: Surgeon(s) and Role:     * Spencer Celeste MD - Primary     * Christine Alex PA-C - Assisting  Anesthesia: General   Estimated blood loss: Less than 50 ml  Drains: None  Specimens: * No specimens in log *  Findings:   None.  Complications: patient developed a patella fracture intra operatively and this required ORIF.  Implants:   Implant Name Type Inv. Item Serial No.  Lot No. LRB No. Used Action   IMP SCR SYN CAN 4.0X46MM LONG THRD .746 - [id number] Metallic Hardware/[place] IMP SCR SYN CAN 4.0X46MM LONG THRD .746  [facility] [id number] 15 JUL 2021 Left 2 Implanted   [employer] 18GA DS-18 - [id number] Wire WIRE SURGICAL STEEL 18GA DS-18  [employer] [facility]- [id number] DEC 2020 Left 1 Implanted

## 2021-07-28 NOTE — PROGRESS NOTES
"   07/28/21 1606   Quick Adds   Type of Visit Initial PT Evaluation   Living Environment   People in home spouse   Living Environment Comments Lives in house with spouse with 2 platform steps to enter, 12 stairs up to bedroom level with B rails. Bathroom includes walkk in shower with shower chair, has RTS attachement with handles.   Self-Care   Usual Activity Tolerance good   Current Activity Tolerance fair   Activity/Exercise/Self-Care Comment Previously independent with mobility without AD, does own 2 FWW.    Disability/Function   Fall history within last six months no   Change in Functional Status Since Onset of Current Illness/Injury yes   General Information   Onset of Illness/Injury or Date of Surgery 07/28/21   Referring Physician Spencer Celeste MD   Patient/Family Therapy Goals Statement (PT) not stated   Pertinent History of Current Problem (include personal factors and/or comorbidities that impact the POC) Patient is POD #0 L Knee quadricepsplasty with intraoperative patellar fracture requiring open reduction & internal fixation of patella. PMH signficant for PAD s/p left femoral-popliteal bypass along with stent to the bypass later on on dual antiplatelet therapy, MDD, hypothyroidism, HLD, myxoid liposarcoma, and previous left tibia fracture and quadriceps rupture repaired in June 2020   Weight-Bearing Status - LLE weight-bearing as tolerated   General Observations Pt's spouse present & supportive. Pt with HKB on throughout session, does allow for flexion/ not locked. Activity: Ambulate with assist; ROM AT, WBAT   Cognition   Orientation Status (Cognition) oriented x 4   Follows Commands (Cognition) over 90% accuracy;follows multi-step commands   Pain Assessment   Patient Currently in Pain Yes, see Vital Sign flowsheet  (5/10 base with \"jabs\" of 9/10 pain at rest)   Integumentary/Edema   Integumentary/Edema Comments L LE ACE wrapped, dressing not visualized; HKB on over ACE wrap   Posture  "   Posture Forward head position;Protracted shoulders   Range of Motion (ROM)   ROM Comment L knee ROM 0-5-35; all other ROM appears WFL   Strength   Strength Comments Pt demos post-op weakness L LE, unable to perform SLR   Bed Mobility   Comment (Bed Mobility) Supine>sit with HOB elevated, use of bedrail and Monica to bring L LE to EOB   Transfers   Transfer Safety Comments Sit>stand from EOB with pt placing both hands on FWW & pressing down, with CGA, walker remains steady   Gait/Stairs (Locomotion)   Comment (Gait/Stairs) Patient able to take a few steps forward & back up with heavy reliance on walker support and CGA   Sensory Examination   Sensory Perception Comments reports baseline tingling L foot   Clinical Impression   Criteria for Skilled Therapeutic Intervention yes, treatment indicated   PT Diagnosis (PT) decreased functional mobility   Influenced by the following impairments pain, limited L knee ROM, L LE post-op weakness, decreased activity tolerance   Functional limitations due to impairments difficulty with bed mobility, transfers, ambulation, stairs   Clinical Presentation Stable/Uncomplicated   Clinical Presentation Rationale clinical judgement   Clinical Decision Making (Complexity) low complexity   Therapy Frequency (PT) 2x/day   Predicted Duration of Therapy Intervention (days/wks) 2 days   Planned Therapy Interventions (PT) gait training;home exercise program;balance training;bed mobility training;cryotherapy;patient/family education;ROM (range of motion);stair training;neuromuscular re-education;strengthening;transfer training;progressive activity/exercise;home program guidelines   Anticipated Equipment Needs at Discharge (PT)   (owns 2 FWW, shower chair, RTS with handles)   Risk & Benefits of therapy have been explained evaluation/treatment results reviewed;care plan/treatment goals reviewed;current/potential barriers reviewed;participants included;patient;spouse/significant other   PT Discharge  Planning    PT Rationale for DC Rec Anticipate pt will be Monica or better with bed mobility and stairs, SBA or better with transfers and ambulation with walker by time of discharge   PT Brief overview of current status  Monica for L LE with bed mobility with HOB elevated, CGA sit>stand & ambulation 100' with FWW.    Total Evaluation Time   Total Evaluation Time (Minutes) 8

## 2021-07-28 NOTE — OR NURSING
The following take home prescriptions were sent to 6th floor via secure tube:  Senna  ASA  Tylenol  Oxycodone

## 2021-07-28 NOTE — ANESTHESIA PROCEDURE NOTES
Airway       Patient location during procedure: OR       Procedure Start/Stop Times: 7/28/2021 7:34 AM  Staff -        CRNA: Mirella Lassiter APRN CRNA       Performed By: CRNA  Consent for Airway        Urgency: elective  Indications and Patient Condition       Indications for airway management: demetrius-procedural       Induction type:intravenous       Mask difficulty assessment: 1 - vent by mask    Final Airway Details       Final airway type: endotracheal airway       Successful airway: ETT - single  Endotracheal Airway Details        ETT size (mm): 7.0       Cuffed: yes       Successful intubation technique: direct laryngoscopy       DL Blade Type: Jeff 2       Grade View of Cords: 1       Adjucts: stylet       Position: Right       Measured from: lips       Secured at (cm): 22       Bite block used: None    Post intubation assessment        Placement verified by: capnometry and equal breath sounds        Number of attempts at approach: 1       Number of other approaches attempted: 0       Secured with: silk tape       Ease of procedure: easy       Dentition: Intact

## 2021-07-28 NOTE — CONSULTS
[facility]  Hospitalist Consult Note  Name: Angeli Jacobson    MRN: [id number]  YOB: 1950    Age: 71 year old  Date of admission: 7/28/2021  Primary care provider: Sepideh Burris     Requesting Physician:  Dr. Celeste  Reason for consult:  Post-operative medical management          Assessment and Plan:   Angeli Jacobson is a 71 year old female with a history of PAD s/p left femoral-popliteal bypass along with stent to the bypass later on on dual antiplatelet therapy, MDD, hypothyroidism, HLD, myxoid liposarcoma, and previous left tibia fracture and quadriceps rupture repaired in June 2020 who was admitted on 7/28/2021 following planned left knee quadricepsplasty with Dr. Celeste. Intraoperatively she did have a patellar fracture requiring ORIF of the patella.  Vitally stable postop.    Assessment and plan by problem:  Arthrofibrosis of left knee:   previous left tibia fracture and quadriceps rupture repaired in June 2020.  Has had difficulty with persistent knee stiffness and minimal flexion despite aggressive PT.  She underwent left knee quadricepsplasty on 7/28/2021 by Dr. Celeste. Intraoperatively she did have a patellar fracture requiring ORIF of the patella.  The patient is doing well.  Currently has well controlled pain and is hemodynamically stable. Will defer diet, activity, DVT prophylaxis, and pain control to the primary team. Currently the patient is on Lovenox for DVT prophylaxis. Continue physical and occupational therapy.  Continue incentive spirometry and follow hemoglobin to evaluate for surgical blood loss and potential need for transfusion.     PAD: History of left femoral-popliteal bypass.  Subsequently required stent to the bypass.  Chronically on aspirin 81 mg daily, Plavix 75 mg daily that were both held for 1 week prior to the procedure.  She is also on Zetia and Crestor 40 mg daily for PAD and HLD which will be resumed.  -Currently on Lovenox for  DVT prophylaxis, recommend resuming aspirin and Plavix (currently on hold) on discharge if okay with orthopedics team given previous stent and leg bypass    Anemia: Hemoglobin 13.7 preoperatively down to 10.5 after surgery.  Secondary to blood loss from surgery.  EBL 50 mL.  -Repeat hemoglobin tomorrow morning    MDD: Not currently any medications for this.    Overactive bladder: Resume Toviaz 4 mg daily or substitute for formulary oxybutynin tomorrow if no retention.    Hypothyroidism: Resume PTA levothyroxine 75 mcg daily.    Code status: Full code  Prophylaxis: per primary team  Disposition: per primary team. PT/OT    Thank you for the consultation, we will continue to follow along during the hospitalization. Please page with any questions or concerns.         History of Present Illness:   Angeli Jacobson is a 71 year old female with a history of PAD s/p left femoral-popliteal bypass along with stent to the bypass later on on dual antiplatelet therapy, MDD, hypothyroidism, HLD, myxoid liposarcoma, and previous left tibia fracture and quadriceps rupture repaired in June 2020 who was admitted on 7/28/2021 following planned left knee quadricepsplasty with Dr. Celeste. Intraoperatively she did have a patellar fracture requiring ORIF of the patella.  Prior to surgery she was in normal state of health.  She was just having difficulty with stiffness of the knee and decreased flexion despite aggressive PT so operative repair was recommended to improve functionality.  Prior to surgery she denies any recent fevers, chills, cough, chest pain, shortness of breath, leg swelling.  She held her aspirin and Plavix for 1 week prior to surgery.  Pre-operative note was fully reviewed and recommendations acknowledged. Op note and anesthesia notes and flow sheets reviewed.     Patient seen postoperatively.  Her pain is better controlled with the pain medications, but she feels like she is too much on board and she is tighter in  the head feels heavy.  She was nauseous earlier, but feels better now.  Denies any chest pain or shortness of breath currently.  No numbness in her foot.              Past Medical History reviewed:     Past Medical History:   Diagnosis Date     * * * SBE PROPHYLAXIS * * * 1998    Amox 500mg, take 4 tabs one hour prior to procedure.Takes this because of lymphedema secondary from leg surgey     Central serous retinopathy 2001    Resolved 9/2001     CHRONIC NECK PAIN 1995     Depressive disorder      Depressive disorder, not elsewhere classified 2001     History of blood transfusion 04/2019 12/2020    during left hip pinning, during surgery for patella     MIXED HYPERLIPIDEMIA, LDL GOAL <160 1998    LDL goal < 160     Motion sickness      MYXOID LIPOSARCOMA 2000    Left thigh, S/P excision, radiation  at [facility]     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    CHRONIC LEFT THIGH LYMPHEDEMA     Nontoxic multinodular goiter 2005    needs yearly US     Osteoporosis, unspecified 2001     Other chronic pain     fx ribs left      Other lymphedema 2000    left thigh, gets regular PT for this     PAD (peripheral artery disease) (H) 4/20/2018     PONV (postoperative nausea and vomiting)      SHINGLES 2001     Sprain of lumbosacral (joint) (ligament) 1995    right     Unspecified hearing loss 1998    chronic tinnitus     Unspecified tinnitus 1998             Past Surgical History reviewed:     Past Surgical History:   Procedure Laterality Date     ARTHROPLASTY HIP Left 10/4/2019    Procedure: Removal of left femoral hardware with a conversion to left total hip arthroplasty using a Biomet Annalisa femoral stem with an OsseoTi acetabular shell and a dual mobility bearing surface;  Surgeon: Spencer Celeste MD;  Location: [facility] OR     BYPASS GRAFT FEMOROPOPLITEAL Left 1/21/2019    Procedure: LEFT FEMORAL TO ABOVE KNEE POPLITEAL  BYPASS WITH POLYTETRAFLUOROETHYLENE GRAFT;  Surgeon: Shade Owens MD;  Location:  OR       APPENDECTOMY      Appendectomy     COLONOSCOPY N/A 2/5/2016    Procedure: COMBINED COLONOSCOPY, SINGLE OR MULTIPLE BIOPSY/POLYPECTOMY BY BIOPSY;  Surgeon: Varun Stanley MD, MD;  Location:  GI     CYSTOSCOPY       EXCISION MALIG LESION>1.25CM  5/2000    Myxoid Liposarcoma       EXPLORE GROIN Right 5/1/2018    Procedure: EXPLORE GROIN;  EMERGENCY RIGHT FEMORAL EXPLORATION WITH FEMORAL ARTERY REPAIR.    EBL: 50mL;  Surgeon: Shade Owens MD;  Location:  OR     HC COLP CERVIX/UPPER VAGINA  07/1997    Negative     HC DILATION/CURETTAGE DIAG/THER NON OB  02/1997    Post menopausal bleeding on HRT, negative     HIP SURGERY Left 04/13/2019     IR ANGIOGRAM THROUGH CATHETER FOLLOW UP  12/20/2018     IR ANGIOGRAM THROUGH CATHETER FOLLOW UP  12/21/2018     IR LOWER EXTREMITY ANGIOGRAM LEFT  12/19/2018     OPEN REDUCTION INTERNAL FIXATION PATELLA Left 12/21/2020    Procedure: Left partial patella fracture excision with advancement of the quadriceps tendon;  Surgeon: Stephen Rhodes MD;  Location:  OR     OPEN REDUCTION INTERNAL FIXATION RODDING INTRAMEDULLARY TIBIA Left 12/21/2020    Procedure: Open reduction intramedullary nailing of left tibia fracture;  Surgeon: Stephen Rhodes MD;  Location:  OR     VASCULAR SURGERY  04/30/2018    Left SFA stent in bypass graft     ZZC NONSPECIFIC PROCEDURE  04/2000    Open Biopsy Left Thigh Liposarcoma     ZZHC COLONOSCOPY THRU STOMA, DIAGNOSTIC  2006    due 2010               Social History reviewed:     Social History     Tobacco Use     Smoking status: Never Smoker     Smokeless tobacco: Never Used   Substance Use Topics     Alcohol use: No             Family History reviewed:     Family History   Problem Relation Age of Onset     C.A.D. Father         MI 57     Alcohol/Drug Father         etoh     Obesity Mother      Osteoporosis Mother      Colon Cancer Brother 70     Hyperlipidemia Son      Hyperlipidemia Son         very high, experimental drug     C.A.D.  Paternal Grandmother         ascvd     Diabetes Maternal Grandmother      Cancer Maternal Grandmother      C.A.D. Paternal Uncle         Mi  age 48     Cancer Maternal Aunt         pancreatic CA     Hyperlipidemia Son              Allergies:     Allergies   Allergen Reactions     Celexa [Citalopram Hydrobromide]      Decreased libido             Medications:     Prior to Admission medications    Medication Sig Last Dose Taking? Auth Provider   acetaminophen (TYLENOL) 325 MG tablet Take 2 tablets (650 mg) by mouth every 4 hours as needed for other (mild pain)  Yes Spencer Celeste MD   acetaminophen (TYLENOL) 325 MG tablet Take 2 tablets (650 mg) by mouth every 6 hours as needed for mild pain Past Week at Unknown time Yes Irena Gilliam PA-C   alendronate (FOSAMAX) 70 MG tablet TAKE 1 TABLET BY MOUTH EVERY 7 DAYS W/8 OZ WATER 30 MIN BEFORE BREAKFAST/REMAIN UPRIGHT 2021 Yes Irena Gilliam PA-C   aspirin (ASA) 325 MG EC tablet Take 1 tablet (325 mg) by mouth 2 times daily  Yes Spencer Celeste MD   aspirin (ASA) 81 MG chewable tablet Take 81 mg by mouth daily 2021 Yes Reported, Patient   calcium carbonate 600 mg-vitamin D 400 units (CALTRATE) 600-400 MG-UNIT per tablet Take 1 chew tab by mouth daily 2021 at Unknown time Yes Reported, Patient   clopidogrel (PLAVIX) 75 MG tablet Take 1 tablet (75 mg) by mouth daily 2021 Yes Tiesha Prince MD   cyclobenzaprine (FLEXERIL) 10 MG tablet Take 0.5 tablets (5 mg) by mouth 3 times daily as needed for muscle spasms Past Month at Unknown time Yes Dayton Najera MD   ezetimibe (ZETIA) 10 MG tablet Take 1 tablet (10 mg) by mouth daily 2021 at Unknown time Yes Tiesha Prince MD   fesoterodine fumarate (TOVIAZ) 4 MG TB24 24 hr tablet Take 1 tablet (4 mg) by mouth daily 2021 at Unknown time Yes Jennifer Oquendo MD   HYDROcodone-acetaminophen (NORCO) 5-325 MG tablet Take 1 tablet by  "mouth every 6 hours as needed for moderate to severe pain Past Month at Unknown time Yes Dayton Najera MD   levothyroxine (SYNTHROID/LEVOTHROID) 75 MCG tablet Take 1 tablet (75 mcg) by mouth daily 7/28/2021 at Unknown time Yes Tiesha Prince MD   multivitamin, therapeutic (THERA-VIT) TABS tablet Take 1 tablet by mouth daily 7/27/2021 at Unknown time Yes Reported, Patient   nitroFURantoin macrocrystal-monohydrate (MACROBID) 100 MG capsule TAKE 1 CAPSULE BY MOUTH AFTER INTRCOURSE 7/27/2021 at Unknown time Yes Sepideh Burris PA-C   oxyCODONE (ROXICODONE) 5 MG tablet Take 1-2 tablets (5-10 mg) by mouth every 3 hours as needed for pain (Moderate to Severe)  Yes Spencer Celeste MD   rosuvastatin (CRESTOR) 20 MG tablet Take 1 tablet (20 mg) by mouth daily Past Week at Unknown time Yes Ramses Benitez MD   senna-docusate (SENOKOT-S/PERICOLACE) 8.6-50 MG tablet Take 1-2 tablets by mouth 2 times daily Take while on oral narcotics to prevent or treat constipation.  Yes Spencer Celeste MD   Vitamin D3 (CHOLECALCIFEROL) 25 mcg (1000 units) tablet Take 2 tablets (50 mcg) by mouth daily 7/27/2021 at Unknown time Yes Sepideh Burris PA-C   order for DME Equipment being ordered: Left Thigh High Compression Stocking, 550 CCL.3   Yanelis Bailey MD   ORDER FOR DME Equipment being ordered: lymphedema bandages   Yanelis Bailey MD       Current hospital administered medication list (MAR) also reviewed.          Review of Systems:     A comprehensive greater than 10 system review of systems was carried out.  Pertinent positives and negatives are noted above.  Otherwise negative for contributory info.            Physical Exam:   Blood pressure 129/64, pulse 85, temperature (!) 96.1  F (35.6  C), temperature source Temporal, resp. rate 10, height 1.676 m (5' 6\"), weight 64.4 kg (142 lb), last menstrual period 03/18/2005, SpO2 99 %, not currently " breastfeeding.    Exam:  Constitutional: Awake, NAD   Eyes: sclera white   HEENT: atraumatic, MMM  Respiratory: no respiratory distress, lungs cta bilaterally, no crackles or wheeze  Cardiovascular: RRR.  No murmur   GI: non-tender, not distended, bowel sounds present  Skin: no rash or lesions, acyanotic  Musculoskeletal/extremities: Left leg with Ace wrap in place and immobilizer, trace left pedal edema  Neurologic: A&O, speech clear, can wiggle toes bilaterally light touch sensation intact  Psychiatric: calm, cooperative, normal affect           Data:   Imaging:  Reviewed.    EKG/Telemetry:  Reviewed.    Labs: Reviewed.   Recent Labs   Lab 07/28/21  1248   HGB 10.5*       Fritz Horner MD  [facility] Hospitalist  July 28, 2021

## 2021-07-29 ENCOUNTER — APPOINTMENT (OUTPATIENT)
Dept: PHYSICAL THERAPY | Facility: CLINIC | Age: 71
End: 2021-07-29
Attending: ORTHOPAEDIC SURGERY
Payer: COMMERCIAL

## 2021-07-29 VITALS
SYSTOLIC BLOOD PRESSURE: 121 MMHG | RESPIRATION RATE: 16 BRPM | DIASTOLIC BLOOD PRESSURE: 57 MMHG | BODY MASS INDEX: 22.82 KG/M2 | HEART RATE: 93 BPM | HEIGHT: 66 IN | WEIGHT: 142 LBS | OXYGEN SATURATION: 98 % | TEMPERATURE: 97.8 F

## 2021-07-29 LAB
CREAT SERPL-MCNC: 0.65 MG/DL (ref 0.52–1.04)
GFR SERPL CREATININE-BSD FRML MDRD: 90 ML/MIN/1.73M2
HGB BLD-MCNC: 7.9 G/DL (ref 11.7–15.7)
HGB BLD-MCNC: 8.3 G/DL (ref 11.7–15.7)

## 2021-07-29 PROCEDURE — 250N000011 HC RX IP 250 OP 636: Performed by: ORTHOPAEDIC SURGERY

## 2021-07-29 PROCEDURE — 85018 HEMOGLOBIN: CPT | Performed by: ORTHOPAEDIC SURGERY

## 2021-07-29 PROCEDURE — 82565 ASSAY OF CREATININE: CPT

## 2021-07-29 PROCEDURE — 97530 THERAPEUTIC ACTIVITIES: CPT | Mod: GP

## 2021-07-29 PROCEDURE — 36415 COLL VENOUS BLD VENIPUNCTURE: CPT | Performed by: INTERNAL MEDICINE

## 2021-07-29 PROCEDURE — 99213 OFFICE O/P EST LOW 20 MIN: CPT | Performed by: INTERNAL MEDICINE

## 2021-07-29 PROCEDURE — 250N000013 HC RX MED GY IP 250 OP 250 PS 637: Performed by: ORTHOPAEDIC SURGERY

## 2021-07-29 PROCEDURE — 99207 PR CDG-CODE CATEGORY CHANGED: CPT | Performed by: INTERNAL MEDICINE

## 2021-07-29 PROCEDURE — 250N000013 HC RX MED GY IP 250 OP 250 PS 637: Performed by: INTERNAL MEDICINE

## 2021-07-29 PROCEDURE — 36415 COLL VENOUS BLD VENIPUNCTURE: CPT | Performed by: ORTHOPAEDIC SURGERY

## 2021-07-29 PROCEDURE — 85018 HEMOGLOBIN: CPT | Performed by: INTERNAL MEDICINE

## 2021-07-29 RX ADMIN — FAMOTIDINE 20 MG: 20 TABLET ORAL at 07:41

## 2021-07-29 RX ADMIN — CEFAZOLIN 1 G: 1 INJECTION, POWDER, FOR SOLUTION INTRAMUSCULAR; INTRAVENOUS at 00:25

## 2021-07-29 RX ADMIN — Medication 50 MCG: at 07:41

## 2021-07-29 RX ADMIN — LEVOTHYROXINE SODIUM 75 MCG: 0.07 TABLET ORAL at 07:41

## 2021-07-29 RX ADMIN — ACETAMINOPHEN 975 MG: 325 TABLET, FILM COATED ORAL at 12:07

## 2021-07-29 RX ADMIN — EZETIMIBE 10 MG: 10 TABLET ORAL at 07:40

## 2021-07-29 RX ADMIN — ACETAMINOPHEN 975 MG: 325 TABLET, FILM COATED ORAL at 05:31

## 2021-07-29 RX ADMIN — DOCUSATE SODIUM AND SENNOSIDES 1 TABLET: 8.6; 5 TABLET, FILM COATED ORAL at 07:40

## 2021-07-29 RX ADMIN — Medication 1 TABLET: at 07:41

## 2021-07-29 RX ADMIN — ROSUVASTATIN CALCIUM 20 MG: 20 TABLET, FILM COATED ORAL at 07:40

## 2021-07-29 NOTE — PLAN OF CARE
Physical Therapy Discharge Summary    Reason for therapy discharge:    All goals and outcomes met, no further needs identified.    Progress towards therapy goal(s). See goals on Care Plan in [facility] electronic health record for goal details.  Goals met    Therapy recommendation(s):    OP PT; timing per ortho.

## 2021-07-29 NOTE — PLAN OF CARE
Vital signs stable, on O2 at 2 lpm nasal cannula with capnography, sats 90's.  Lungs clear, encouraged inspirometer use.  Bowel sounds hypo, no nausea, tolerated  diet.  CMS intact, dressing dry, hinged brace on.Ice pack applied to left knee.  Ambulated in room and up to bedside commode using gait belt, walker and assist of 1.Voiding.  Pain controlled with tylenol, oxycodone.  Care plan reviewed with pt and spouse.

## 2021-07-29 NOTE — PROGRESS NOTES
"Orthopedic Surgery  7/29/2021  POD#: 1    S: Patient voices no complaints today.     O: Blood pressure 121/66, pulse 82, temperature 97.4  F (36.3  C), temperature source Temporal, resp. rate 16, height 1.676 m (5' 6\"), weight 64.4 kg (142 lb), last menstrual period 03/18/2005, SpO2 96 %, not currently breastfeeding.  Lab Results   Component Value Date    HGB 7.9 07/29/2021    HGB 13.2 05/17/2021     Lab Results   Component Value Date    INR 1.01 09/27/2019        I/O last 3 completed shifts:  In: 1590 [P.O.:490; I.V.:68; IV Piggyback:1032]  Out: 1950 [Urine:1900; Blood:50]    Neurovascularly intact.  Calves are negative bilaterally, both soft and nontender.  The wound is C/D/I. Aquacel dry.  The wound looks good with minimal erythema of the surrounding skin.    A: Ms. Jacobson is doing well status post Procedure(s):  Left knee quadricepsplasty with intraoperative patellar fracture requiring open reduction and internal fixation of the patella.    P:   1. Mobilize and continue physical therapy. WBAT with hinged knee brace locked 0-30 degrees.  2. Anemia - Hgb down to 7.9 this morning; redraw at Noon; discharge this afternoon and resume plavix and ASA tomorrow if Hgb stable  3. Anticoagulation - resume plavix and ASA  4. Pain management - controlled  5. Anticipate discharge to home when medically cleared.(Hgb stable).      Christine Alex PA-C  O: [phone]  "

## 2021-07-29 NOTE — PLAN OF CARE
Occupational Therapy: Orders received. Chart reviewed and discussed with care team.? Occupational Therapy not indicated due to pt has no adaptive equipment or self-care therapy needs and has good support at discharge.? Defer discharge recommendations to PT.? Will complete orders.

## 2021-07-29 NOTE — PROGRESS NOTES
[facility]  Hospitalist Progress Note  Fritz Horner MD 07/29/21    Reason for Stay (Diagnosis): s/p L quadricepsplasty, ORIF L patella         Assessment and Plan:      Summary of Stay: Angeli Jacobson is a 71 year old female with a history of PAD s/p left femoral-popliteal bypass along with stent to the bypass later on on dual antiplatelet therapy, MDD, hypothyroidism, HLD, myxoid liposarcoma, and previous left tibia fracture and quadriceps rupture repaired in June 2020 who was admitted on 7/28/2021 following planned left knee quadricepsplasty with Dr. Celeste. Intraoperatively she did have a patellar fracture requiring ORIF of the patella.  Vitally stable postop. Hg drop from 13.7-7.9 POD1, rechecking at noon.  Passed PT.     Assessment and plan by problem:  Arthrofibrosis of left knee:   previous left tibia fracture and quadriceps rupture repaired in June 2020.  Has had difficulty with persistent knee stiffness and minimal flexion despite aggressive PT.  She underwent left knee quadricepsplasty on 7/28/2021 by Dr. Celeste. Intraoperatively she did have a patellar fracture requiring ORIF of the patella.  The patient is doing well.  Currently has well controlled pain and is hemodynamically stable. Will defer diet, activity, DVT prophylaxis, and pain control to the primary team.  Lovenox held for blood loss.  Passed PT. Continue physical and occupational therapy.  Continue incentive spirometry.     PAD: History of left femoral-popliteal bypass.  Subsequently required stent to the bypass.  Chronically on aspirin 81 mg daily, Plavix 75 mg daily that were both held for 1 week prior to the procedure.  She is also on Zetia and Crestor 40 mg daily for PAD and HLD which will be resumed.  -if hemoglobin comes back above 8 and orthopedics plans on discharge I would recommend she restart her aspirin and plavix in 2 days at home     Acute blood loss anemia: Hemoglobin 13.7 preoperatively down to 10.5  "after surgery, now 7.9 POD1.  Reported surgical EBL 50 mL.  Did not receive any lovenox.  This is a non-trivial drop in Hg.  Her left leg inferior to the patella does feel warm and tense, although there is an ACE wrap in place.  Per the patient her left leg has edema chronically and is more tense than the right.  No other source of blood loss identified.  Currently does not have symptoms from her anemia.  -recommend repeating Hg at noon, if down trending I would consider transfusion if blood bank approves and monitoring patient overnight in case their is any oozing  -Orthopedics has not been by yet this morning to evaluate the surgical site  -hold lovenox today  -if hemoglobin comes back above 8 and orthopedics plans on discharge I would recommend she restart her aspirin and plavix in 2 days     MDD: Not currently any medications for this.     Overactive bladder: Resume Toviaz 4 mg daily or substitute for formulary oxybutynin tomorrow if no retention.     Hypothyroidism: Resume PTA levothyroxine 75 mcg daily.     Code status: Full code  Prophylaxis: lovenox held for bleeding  Disposition:  if hemoglobin comes back above 8 and orthopedics plans on discharge I would recommend she restart her aspirin and plavix in 2 days.  If Hg dropping further would recommend transfusion and monitoring overnight.    Addendum: Hemoglobin up to 8.3.  Vital signs stable.  Has been seen by orthopedics and they are okay with discharge.  Okay with discharge from hospitalist standpoint given improved hemoglobin on recheck and no symptoms from her anemia.        Interval History (Subjective):      Pain controlled on acetaminophen.  Denies any lightheadedness when up with therapy, shortness of breath, chest pain, or significant fatigue.  Eating ok.  Voiding.                  Physical Exam:      Last Vital Signs:  /66   Pulse 82   Temp 97.4  F (36.3  C) (Temporal)   Resp 16   Ht 1.676 m (5' 6\")   Wt 64.4 kg (142 lb)   LMP " 03/18/2005   SpO2 96%   BMI 22.92 kg/m        Intake/Output Summary (Last 24 hours) at 7/29/2021 0947  Last data filed at 7/29/2021 0730  Gross per 24 hour   Intake 2190 ml   Output 1900 ml   Net 290 ml       Constitutional: Awake, NAD   Eyes: sclera white   HEENT:  MMM  Respiratory:   lungs cta bilaterally, no crackles or wheeze  Cardiovascular: RRR.  No murmur   Skin: no rash or lesions, acyanotic  Musculoskeletal/extremities: left leg wrapped in immobilizer.  L leg does feel tense inferior to patella.  Trace L pedal edema  Neurologic: A&O, wiggles toes light touch sensation intact  Psychiatric: calm, cooperative, normal affect         Medications:      All current medications were reviewed with changes reflected in problem list.         Data:      All new lab and imaging data was reviewed.   Labs:  Recent Labs   Lab 07/29/21  0716 07/28/21  1248   CR 0.65 0.70   GFRESTIMATED 90 87     Recent Labs   Lab 07/29/21  0716 07/28/21  1248   HGB 7.9* 10.5*      Imaging:   None today      Fritz Horner MD

## 2021-08-03 PROBLEM — R93.1 ELEVATED CORONARY ARTERY CALCIUM SCORE: Status: ACTIVE | Noted: 2021-08-03

## 2021-08-09 ENCOUNTER — OFFICE VISIT (OUTPATIENT)
Dept: CARDIOLOGY | Facility: CLINIC | Age: 71
End: 2021-08-09
Attending: PHYSICIAN ASSISTANT
Payer: COMMERCIAL

## 2021-08-09 VITALS
WEIGHT: 145 LBS | OXYGEN SATURATION: 99 % | SYSTOLIC BLOOD PRESSURE: 110 MMHG | HEART RATE: 87 BPM | BODY MASS INDEX: 22.76 KG/M2 | DIASTOLIC BLOOD PRESSURE: 70 MMHG | HEIGHT: 67 IN

## 2021-08-09 DIAGNOSIS — E78.5 HYPERLIPIDEMIA LDL GOAL <70: ICD-10-CM

## 2021-08-09 DIAGNOSIS — Z85.831 HISTORY OF SARCOMA: ICD-10-CM

## 2021-08-09 DIAGNOSIS — I73.9 PAD (PERIPHERAL ARTERY DISEASE) (H): ICD-10-CM

## 2021-08-09 DIAGNOSIS — I25.10 CORONARY ARTERY DISEASE INVOLVING NATIVE CORONARY ARTERY OF NATIVE HEART WITHOUT ANGINA PECTORIS: Primary | ICD-10-CM

## 2021-08-09 PROCEDURE — 99204 OFFICE O/P NEW MOD 45 MIN: CPT | Performed by: INTERNAL MEDICINE

## 2021-08-09 ASSESSMENT — MIFFLIN-ST. JEOR: SCORE: 1197.41

## 2021-08-09 NOTE — PROGRESS NOTES
CARDIOLOGY CLINIC CONSULTATION      REASON FOR CONSULT:   Abnormal coronary calcium score    PRIMARY CARE PHYSICIAN:  Sepideh Burris        History of Present Illness   Angeli Jacobson is an extremely pleasant 71 year old female here as a new patient to discuss her abnormal coronary calcium score.  She has a past medical history significant for remote left thigh myxoid liposarcoma s/p resection in 2000 that resulted in removal of a portion of her femoral artery that was apparently then replaced by graft, recurrent peripheral artery disease in the same area s/p multiple peripheral vascular interventions, most recently a left femoral-popliteal bypass in 1/2019, as well as dyslipidemia and left tibial fracture in 12/2020 with several subsequent orthopedic surgeries.    She presents today for evaluation due to a recent coronary calcium score that was severely elevated.  She tells me that this was not done due to any particular symptoms, but that her son recently had 1 of these done and was found to have an elevated calcium score, and as a result she decided that she should get one as well.  Her coronary calcium score was done on 7/1/2021, and showed a total calcium score of 3897 (1.5 in the left main, 1324 in the LAD, 23.6 in the circumflex, and 2548 in the RCA).  Accordingly, she was referred to cardiology.    From a symptomatic standpoint, she does not note any cardiac symptoms.  Since her fall in 12/2020, she has been much more limited due to her orthopedic leg pain, but before this she was able to walk at least a mile at a time without any chest discomfort or excessive shortness of breath.  In the past, she had been limited by left leg fatigue.  She has no significant palpitations.  She has chronic lymphedema of the left leg, but no significant swelling in the right leg.  No syncope.    Her most recent lipid panel was from 5/17/2021, and showed a total cholesterol 196, HDL of 56, LDL of 124, and  triglycerides of 76.  ECG done on 7/21/2021 showed normal sinus rhythm with right bundle branch block and left anterior fascicular block.  I do not see an echocardiogram in our system.      Assessment & Plan     1. CAD by CT scan (coronary calcium score 3897), asymptomatic  2. Severe LLE PAD s/p multiple interventions, most recently femoropopliteal bypass 1/2019  3. Familial hyperlipidemia, still inadequately controlled despite maximum tolerated statin and Zetia  4. Left tibial fracture in 12/2020 s/p several orthopedic surgeries      It was a pleasure to meet with Angeli and her  in clinic today.  We had a lengthy discussion today about the appropriate management of her severely elevated coronary calcium score.  We discussed the potential evaluation options including conservative expectant management, noninvasive stress testing, and invasive coronary angiography.  I discussed the results of recent trials such as the ISCHEMIA trial, which suggest that in asymptomatic patients without significant left main disease, even if we were to find that she has moderate or severe ischemia on a stress test, invasive management would be unlikely to significantly improve any heart outcomes for her.  Accordingly, I would recommend that we treat her with optimal medical therapy alone, and would proceed to invasive coronary angiography only if she developed symptoms in the future, or if she were to need to undergo a repeat vascular or other high risk surgery in the future.  I did discuss that some other providers may recommend stress testing, which again would not be unreasonable, though I do not think that the data supports this.  She and her  expressed understanding of this and preferred to continue with conservative management.    Along those lines, I recommended that she continue with her current aspirin and Plavix.  I also recommended that we work to reduce her cholesterol as much as possible.  She is already  following a fairly good diet, and is taking both Crestor 20 mg daily (felt ill on the 40 mg daily and cannot tolerate this) and Zetia 10 mg daily.  Despite this, her last LDL was 124 in 5/2021.  Accordingly, I think that she would be an appropriate candidate for a PCSK9 inhibitor, and we will begin to work on initiating this.      -Conservative management of elevated, asymptomatic coronary calcium score for now  -Proceed with invasive angiography if suggestive symptoms develop in the future  -Continue DAPT with aspirin 81 mg daily and Plavix 75 mg daily  -Continue Crestor 20 mg daily and Zetia 10 mg daily  -Will initiate process for PCSK9 approval      Follow-up: 6 months, or sooner as needed      On the date of the patient's visit, I spent a total of 47 minutes reviewing the patient's chart; interviewing, examining, and counseling the patient; coordinating with other providers as necessary, entering orders, and documenting in the medical chart.      Spencer Wilder MD  Interventional Cardiology  August 9, 2021        Medications   Current Outpatient Medications   Medication     acetaminophen (TYLENOL) 325 MG tablet     alendronate (FOSAMAX) 70 MG tablet     aspirin (ASA) 81 MG chewable tablet     calcium carbonate 600 mg-vitamin D 400 units (CALTRATE) 600-400 MG-UNIT per tablet     clopidogrel (PLAVIX) 75 MG tablet     cyclobenzaprine (FLEXERIL) 10 MG tablet     ezetimibe (ZETIA) 10 MG tablet     fesoterodine fumarate (TOVIAZ) 4 MG TB24 24 hr tablet     HYDROcodone-acetaminophen (NORCO) 5-325 MG tablet     levothyroxine (SYNTHROID/LEVOTHROID) 75 MCG tablet     multivitamin, therapeutic (THERA-VIT) TABS tablet     nitroFURantoin macrocrystal-monohydrate (MACROBID) 100 MG capsule     order for DME     ORDER FOR DME     oxyCODONE (ROXICODONE) 5 MG tablet     rosuvastatin (CRESTOR) 20 MG tablet     senna-docusate (SENOKOT-S/PERICOLACE) 8.6-50 MG tablet     Vitamin D3 (CHOLECALCIFEROL) 25 mcg (1000 units) tablet      No current facility-administered medications for this visit.     Allergies   Allergies   Allergen Reactions     Celexa [Citalopram Hydrobromide]      Decreased libido         Physical Exam       BP: 110/70 Pulse: 87     SpO2: 99 %      Vital Signs with Ranges  Pulse:  [87] 87  BP: (110)/(70) 110/70  SpO2:  [99 %] 99 %  145 lbs 0 oz    Constitutional: Well-appearing, no acute distress, left lower leg wrapped  Respiratory: Normal respiratory effort, CTAB  Cardiovascular: RRR, no m/r/g.  JVP < 7 cm H2O.  There is no right LE edema.  As above, left lower leg is wrapped with an Ace bandage.  Normal carotid upstrokes, no carotid bruits.

## 2021-08-09 NOTE — LETTER
8/9/2021    Sepideh Burris PA-C  [id number] [facility] [address]    RE: Angeli Jacobson       Dear Colleague,    I had the pleasure of seeing Angeli Jacobson in the [facility].    CARDIOLOGY CLINIC CONSULTATION      REASON FOR CONSULT:   Abnormal coronary calcium score    PRIMARY CARE PHYSICIAN:  Sepideh Burris        History of Present Illness   Angeli Jacobson is an extremely pleasant 71 year old female here as a new patient to discuss her abnormal coronary calcium score.  She has a past medical history significant for remote left thigh myxoid liposarcoma s/p resection in 2000 that resulted in removal of a portion of her femoral artery that was apparently then replaced by graft, recurrent peripheral artery disease in the same area s/p multiple peripheral vascular interventions, most recently a left femoral-popliteal bypass in 1/2019, as well as dyslipidemia and left tibial fracture in 12/2020 with several subsequent orthopedic surgeries.    She presents today for evaluation due to a recent coronary calcium score that was severely elevated.  She tells me that this was not done due to any particular symptoms, but that her son recently had 1 of these done and was found to have an elevated calcium score, and as a result she decided that she should get one as well.  Her coronary calcium score was done on 7/1/2021, and showed a total calcium score of 3897 (1.5 in the left main, 1324 in the LAD, 23.6 in the circumflex, and 2548 in the RCA).  Accordingly, she was referred to cardiology.    From a symptomatic standpoint, she does not note any cardiac symptoms.  Since her fall in 12/2020, she has been much more limited due to her orthopedic leg pain, but before this she was able to walk at least a mile at a time without any chest discomfort or excessive shortness of breath.  In the past, she had been limited by left leg fatigue.  She has  no significant palpitations.  She has chronic lymphedema of the left leg, but no significant swelling in the right leg.  No syncope.    Her most recent lipid panel was from 5/17/2021, and showed a total cholesterol 196, HDL of 56, LDL of 124, and triglycerides of 76.  ECG done on 7/21/2021 showed normal sinus rhythm with right bundle branch block and left anterior fascicular block.  I do not see an echocardiogram in our system.      Assessment & Plan     1. CAD by CT scan (coronary calcium score 3897), asymptomatic  2. Severe LLE PAD s/p multiple interventions, most recently femoropopliteal bypass 1/2019  3. Familial hyperlipidemia, still inadequately controlled despite maximum tolerated statin and Zetia  4. Left tibial fracture in 12/2020 s/p several orthopedic surgeries      It was a pleasure to meet with Angeli and her  in clinic today.  We had a lengthy discussion today about the appropriate management of her severely elevated coronary calcium score.  We discussed the potential evaluation options including conservative expectant management, noninvasive stress testing, and invasive coronary angiography.  I discussed the results of recent trials such as the ISCHEMIA trial, which suggest that in asymptomatic patients without significant left main disease, even if we were to find that she has moderate or severe ischemia on a stress test, invasive management would be unlikely to significantly improve any heart outcomes for her.  Accordingly, I would recommend that we treat her with optimal medical therapy alone, and would proceed to invasive coronary angiography only if she developed symptoms in the future, or if she were to need to undergo a repeat vascular or other high risk surgery in the future.  I did discuss that some other providers may recommend stress testing, which again would not be unreasonable, though I do not think that the data supports this.  She and her  expressed understanding of  this and preferred to continue with conservative management.    Along those lines, I recommended that she continue with her current aspirin and Plavix.  I also recommended that we work to reduce her cholesterol as much as possible.  She is already following a fairly good diet, and is taking both Crestor 20 mg daily (felt ill on the 40 mg daily and cannot tolerate this) and Zetia 10 mg daily.  Despite this, her last LDL was 124 in 5/2021.  Accordingly, I think that she would be an appropriate candidate for a PCSK9 inhibitor, and we will begin to work on initiating this.      -Conservative management of elevated, asymptomatic coronary calcium score for now  -Proceed with invasive angiography if suggestive symptoms develop in the future  -Continue DAPT with aspirin 81 mg daily and Plavix 75 mg daily  -Continue Crestor 20 mg daily and Zetia 10 mg daily  -Will initiate process for PCSK9 approval      Follow-up: 6 months, or sooner as needed      On the date of the patient's visit, I spent a total of 47 minutes reviewing the patient's chart; interviewing, examining, and counseling the patient; coordinating with other providers as necessary, entering orders, and documenting in the medical chart.      Spencer Wilder MD  Interventional Cardiology  August 9, 2021        Medications   Current Outpatient Medications   Medication     acetaminophen (TYLENOL) 325 MG tablet     alendronate (FOSAMAX) 70 MG tablet     aspirin (ASA) 81 MG chewable tablet     calcium carbonate 600 mg-vitamin D 400 units (CALTRATE) 600-400 MG-UNIT per tablet     clopidogrel (PLAVIX) 75 MG tablet     cyclobenzaprine (FLEXERIL) 10 MG tablet     ezetimibe (ZETIA) 10 MG tablet     fesoterodine fumarate (TOVIAZ) 4 MG TB24 24 hr tablet     HYDROcodone-acetaminophen (NORCO) 5-325 MG tablet     levothyroxine (SYNTHROID/LEVOTHROID) 75 MCG tablet     multivitamin, therapeutic (THERA-VIT) TABS tablet     nitroFURantoin macrocrystal-monohydrate (MACROBID) 100  MG capsule     order for DME     ORDER FOR DME     oxyCODONE (ROXICODONE) 5 MG tablet     rosuvastatin (CRESTOR) 20 MG tablet     senna-docusate (SENOKOT-S/PERICOLACE) 8.6-50 MG tablet     Vitamin D3 (CHOLECALCIFEROL) 25 mcg (1000 units) tablet     No current facility-administered medications for this visit.     Allergies   Allergies   Allergen Reactions     Celexa [Citalopram Hydrobromide]      Decreased libido         Physical Exam       BP: 110/70 Pulse: 87     SpO2: 99 %      Vital Signs with Ranges  Pulse:  [87] 87  BP: (110)/(70) 110/70  SpO2:  [99 %] 99 %  145 lbs 0 oz    Constitutional: Well-appearing, no acute distress, left lower leg wrapped  Respiratory: Normal respiratory effort, CTAB  Cardiovascular: RRR, no m/r/g.  JVP < 7 cm H2O.  There is no right LE edema.  As above, left lower leg is wrapped with an Ace bandage.  Normal carotid upstrokes, no carotid bruits.        Thank you for allowing me to participate in the care of your patient.      Sincerely,     Spencer Wilder MD     [facility]  cc:   Sepideh Burris PA-C  [address] [place] [address]

## 2021-08-10 ENCOUNTER — TRANSFERRED RECORDS (OUTPATIENT)
Dept: HEALTH INFORMATION MANAGEMENT | Facility: CLINIC | Age: 71
End: 2021-08-10

## 2021-08-18 ENCOUNTER — CARE COORDINATION (OUTPATIENT)
Dept: CARDIOLOGY | Facility: CLINIC | Age: 71
End: 2021-08-18

## 2021-08-18 DIAGNOSIS — E78.5 HYPERLIPIDEMIA LDL GOAL <70: Primary | ICD-10-CM

## 2021-08-18 NOTE — PROGRESS NOTES
"Received request from Dr. Wilder to start PA process for Praluent 75mg/mL with plan to check pt's fasting lipid panel 2-3 months after starting    Pt saw Dr. Wilder in clinic on 8/9/21 and note states, \"Continue Crestor 20 mg daily and Zetia 10 mg daily  -Will initiate process for PCSK9 approval\"    Pralulent Rx entered and DivXt message sent to pt.     ANNEMARIE Dobbs 3:23 PM 8/18/2021        "

## 2021-08-19 NOTE — TELEPHONE ENCOUNTER
PA Initiation    Medication: Praluent 75MG/ML auto-injectors (PENDING)  Insurance Company: [employer] [place] - Phone [phone] Fax [phone]  Pharmacy Filling the Rx: [facility] - [place], [place] - [address]  Filling Pharmacy Phone:    Filling Pharmacy Fax:    Start Date: 8/19/2021        Thank you,    Brtiney Leger h-T  Specialty Pharmacy Clinic [facility] [address] [address]  Ph: [phone] Fax: [phone]  [email or web address]

## 2021-08-24 ENCOUNTER — TRANSFERRED RECORDS (OUTPATIENT)
Dept: HEALTH INFORMATION MANAGEMENT | Facility: CLINIC | Age: 71
End: 2021-08-24

## 2021-08-26 NOTE — TELEPHONE ENCOUNTER
Medication Appeal Initiation    We have initiated an appeal for the requested medication:  Medication: Praluent 75MG/ML auto-injectors (APPEAL - PENDING)  Appeal Start Date:  8/26/2021  Insurance Company: [employer] [place] - Phone [phone] Fax [phone]  Comments:  Appeal letter submitted with most recent office visit and praluent prescribing information. Fax #: [phone]    Thank you,    Britney Leger [facility]  Specialty Pharmacy Clinic [address] [address]  Ph: [phone] Fax: [phone]  [email or web address]

## 2021-08-27 NOTE — PROGRESS NOTES
Discontinued Praluent Rx and entered Repatha Rx and sent MyChart update to ricki Dobbs RN 4:02 PM 8/27/2021

## 2021-08-27 NOTE — PROGRESS NOTES
Routed to Dr. Wilder to confirm that switching to Repatha from Praluent is OK since he specifically requested Praluent.     ANNEMARIE Dobbs 3:31 PM 8/27/2021

## 2021-08-27 NOTE — TELEPHONE ENCOUNTER
VM on Lanas phone from Mike guevara pharmacist at [facility].  He is reviewing the appeal and is wondering if the medication can be switched to Repatha.  He stated based off the info in the appeal he thought that would be okay.      Is it okay to change to Repatha?     If not I can call Mike back at [phone] and explain why    [facility] Pharmacy Liaison  P:[phone]

## 2021-08-30 ENCOUNTER — TELEPHONE (OUTPATIENT)
Dept: CARDIOLOGY | Facility: CLINIC | Age: 71
End: 2021-08-30

## 2021-08-30 NOTE — TELEPHONE ENCOUNTER
Prior Authorization Approval    Authorization Effective Date: 6/1/2021  Authorization Expiration Date: 8/30/2022  Medication: Repatha- Approved  Approved Dose/Quantity: 140mg/ 2 per 28 days  Reference #: CMM Key [id number]   Insurance Company: [employer] - Phone [phone] Fax [phone]  Expected CoPay:       CoPay Card Available:      Foundation Assistance Needed:    Which Pharmacy is filling the prescription (Not needed for infusion/clinic administered): [facility] - [facility] [address]  Pharmacy Notified: Yes  Patient Notified: Yes

## 2021-08-30 NOTE — TELEPHONE ENCOUNTER
PA Initiation    Medication: Repatha- Pending  Insurance Company: [employer] - Phone [phone] Fax [phone]  Pharmacy Filling the Rx: [facility] - [place], [place] - [facility] [address]  Filling Pharmacy Phone:    Filling Pharmacy Fax:    Start Date:

## 2021-09-02 ENCOUNTER — MYC MEDICAL ADVICE (OUTPATIENT)
Dept: SURGERY | Facility: CLINIC | Age: 71
End: 2021-09-02

## 2021-09-02 NOTE — TELEPHONE ENCOUNTER
MEDICATION APPEAL DENIED    Medication: Praluent 75MG/ML auto-injectors (APPEAL - DENIED)    Denial Date: 9/2/2021    Denial Rational: Must try/fail Repatha

## 2021-09-06 ENCOUNTER — MYC MEDICAL ADVICE (OUTPATIENT)
Dept: FAMILY MEDICINE | Facility: CLINIC | Age: 71
End: 2021-09-06

## 2021-09-09 ENCOUNTER — LAB (OUTPATIENT)
Dept: LAB | Facility: CLINIC | Age: 71
End: 2021-09-09
Payer: COMMERCIAL

## 2021-09-09 ENCOUNTER — THERAPY VISIT (OUTPATIENT)
Dept: PHYSICAL THERAPY | Facility: CLINIC | Age: 71
End: 2021-09-09
Payer: COMMERCIAL

## 2021-09-09 DIAGNOSIS — G89.29 CHRONIC PAIN OF LEFT KNEE: Primary | ICD-10-CM

## 2021-09-09 DIAGNOSIS — M25.562 CHRONIC PAIN OF LEFT KNEE: Primary | ICD-10-CM

## 2021-09-09 DIAGNOSIS — E03.9 HYPOTHYROIDISM, UNSPECIFIED TYPE: ICD-10-CM

## 2021-09-09 DIAGNOSIS — Z98.890 S/P ORIF (OPEN REDUCTION INTERNAL FIXATION) FRACTURE: ICD-10-CM

## 2021-09-09 DIAGNOSIS — Z95.828 HISTORY OF ARTERIAL BYPASS OF LOWER EXTREMITY: ICD-10-CM

## 2021-09-09 DIAGNOSIS — Z87.81 S/P ORIF (OPEN REDUCTION INTERNAL FIXATION) FRACTURE: ICD-10-CM

## 2021-09-09 DIAGNOSIS — E78.5 HYPERLIPIDEMIA LDL GOAL <70: ICD-10-CM

## 2021-09-09 LAB
ALBUMIN SERPL-MCNC: 3.4 G/DL (ref 3.4–5)
ALP SERPL-CCNC: 85 U/L (ref 40–150)
ALT SERPL W P-5'-P-CCNC: 20 U/L (ref 0–50)
ANION GAP SERPL CALCULATED.3IONS-SCNC: 5 MMOL/L (ref 3–14)
AST SERPL W P-5'-P-CCNC: 19 U/L (ref 0–45)
BASOPHILS # BLD AUTO: 0.1 10E3/UL (ref 0–0.2)
BASOPHILS NFR BLD AUTO: 1 %
BILIRUB SERPL-MCNC: 0.3 MG/DL (ref 0.2–1.3)
BUN SERPL-MCNC: 15 MG/DL (ref 7–30)
CALCIUM SERPL-MCNC: 9.1 MG/DL (ref 8.5–10.1)
CHLORIDE BLD-SCNC: 110 MMOL/L (ref 94–109)
CHOLEST SERPL-MCNC: 119 MG/DL
CO2 SERPL-SCNC: 26 MMOL/L (ref 20–32)
CREAT SERPL-MCNC: 0.73 MG/DL (ref 0.52–1.04)
EOSINOPHIL # BLD AUTO: 0.2 10E3/UL (ref 0–0.7)
EOSINOPHIL NFR BLD AUTO: 3 %
ERYTHROCYTE [DISTWIDTH] IN BLOOD BY AUTOMATED COUNT: 14.1 % (ref 10–15)
FASTING STATUS PATIENT QL REPORTED: YES
GFR SERPL CREATININE-BSD FRML MDRD: 83 ML/MIN/1.73M2
GLUCOSE BLD-MCNC: 85 MG/DL (ref 70–99)
HCT VFR BLD AUTO: 38.1 % (ref 35–47)
HDLC SERPL-MCNC: 53 MG/DL
HGB BLD-MCNC: 12.2 G/DL (ref 11.7–15.7)
LDLC SERPL CALC-MCNC: 50 MG/DL
LYMPHOCYTES # BLD AUTO: 1.9 10E3/UL (ref 0.8–5.3)
LYMPHOCYTES NFR BLD AUTO: 28 %
MCH RBC QN AUTO: 28.6 PG (ref 26.5–33)
MCHC RBC AUTO-ENTMCNC: 32 G/DL (ref 31.5–36.5)
MCV RBC AUTO: 89 FL (ref 78–100)
MONOCYTES # BLD AUTO: 0.5 10E3/UL (ref 0–1.3)
MONOCYTES NFR BLD AUTO: 7 %
NEUTROPHILS # BLD AUTO: 4.3 10E3/UL (ref 1.6–8.3)
NEUTROPHILS NFR BLD AUTO: 62 %
NONHDLC SERPL-MCNC: 66 MG/DL
PLATELET # BLD AUTO: 307 10E3/UL (ref 150–450)
POTASSIUM BLD-SCNC: 4.2 MMOL/L (ref 3.4–5.3)
PROT SERPL-MCNC: 7.4 G/DL (ref 6.8–8.8)
RBC # BLD AUTO: 4.27 10E6/UL (ref 3.8–5.2)
SODIUM SERPL-SCNC: 141 MMOL/L (ref 133–144)
TRIGL SERPL-MCNC: 78 MG/DL
TSH SERPL DL<=0.005 MIU/L-ACNC: 0.93 MU/L (ref 0.4–4)
WBC # BLD AUTO: 6.9 10E3/UL (ref 4–11)

## 2021-09-09 PROCEDURE — 80061 LIPID PANEL: CPT

## 2021-09-09 PROCEDURE — 36415 COLL VENOUS BLD VENIPUNCTURE: CPT

## 2021-09-09 PROCEDURE — 97110 THERAPEUTIC EXERCISES: CPT | Mod: GP | Performed by: PHYSICAL THERAPIST

## 2021-09-09 PROCEDURE — 85025 COMPLETE CBC W/AUTO DIFF WBC: CPT

## 2021-09-09 PROCEDURE — 80053 COMPREHEN METABOLIC PANEL: CPT

## 2021-09-09 PROCEDURE — 84443 ASSAY THYROID STIM HORMONE: CPT

## 2021-09-09 PROCEDURE — 97161 PT EVAL LOW COMPLEX 20 MIN: CPT | Mod: GP | Performed by: PHYSICAL THERAPIST

## 2021-09-09 ASSESSMENT — ACTIVITIES OF DAILY LIVING (ADL)
GO UP STAIRS: ACTIVITY IS SOMEWHAT DIFFICULT
WEAKNESS: NOT ANSWERED
KNEEL ON THE FRONT OF YOUR KNEE: I AM UNABLE TO DO THE ACTIVITY
STIFFNESS: THE SYMPTOM AFFECTS MY ACTIVITY SEVERELY
GO DOWN STAIRS: ACTIVITY IS SOMEWHAT DIFFICULT
GIVING WAY, BUCKLING OR SHIFTING OF KNEE: I DO NOT HAVE THE SYMPTOM
SIT WITH YOUR KNEE BENT: I AM UNABLE TO DO THE ACTIVITY
PAIN: I DO NOT HAVE THE SYMPTOM
LIMPING: THE SYMPTOM AFFECTS MY ACTIVITY SEVERELY
RAW_SCORE: 31.23
KNEE_ACTIVITY_OF_DAILY_LIVING_SCORE: 44.61
SWELLING: THE SYMPTOM AFFECTS MY ACTIVITY SEVERELY
SQUAT: I AM UNABLE TO DO THE ACTIVITY
STAND: ACTIVITY IS NOT DIFFICULT
KNEE_ACTIVITY_OF_DAILY_LIVING_SUM: 29
AS_A_RESULT_OF_YOUR_KNEE_INJURY,_HOW_WOULD_YOU_RATE_YOUR_CURRENT_LEVEL_OF_DAILY_ACTIVITY?: ABNORMAL
RISE FROM A CHAIR: ACTIVITY IS FAIRLY DIFFICULT
HOW_WOULD_YOU_RATE_THE_OVERALL_FUNCTION_OF_YOUR_KNEE_DURING_YOUR_USUAL_DAILY_ACTIVITIES?: SEVERELY ABNORMAL
WALK: ACTIVITY IS SOMEWHAT DIFFICULT
HOW_WOULD_YOU_RATE_THE_CURRENT_FUNCTION_OF_YOUR_KNEE_DURING_YOUR_USUAL_DAILY_ACTIVITIES_ON_A_SCALE_FROM_0_TO_100_WITH_100_BEING_YOUR_LEVEL_OF_KNEE_FUNCTION_PRIOR_TO_YOUR_INJURY_AND_0_BEING_THE_INABILITY_TO_PERFORM_ANY_OF_YOUR_USUAL_DAILY_ACTIVITIES?: 60

## 2021-09-09 NOTE — PROGRESS NOTES
Physical Therapy Initial Evaluation  Subjective:    Patient Health History  Angeli Jacobson being seen for Gain motion in left knee.     Problem began: 7/28/2021.   Problem occurred: Knee cap broke at the end of a quadriceplasty surgery.   Pain is reported as 0/10 on pain scale.  General health as reported by patient is fair.  Pertinent medical history includes: cancer and history of fractures.   Red flags:  None as reported by patient.  Medical allergies: none.   Surgeries include:  Orthopedic surgery and cancer surgery.    Current medications:  Bone density and other. Other medications details: cholesterol lowering medications.    Current occupation is retired; do not work outside the home..   Primary job tasks include:  Lifting/carrying.                  Therapist Generated HPI Evaluation  Problem details: Pt c/o L knee pain and ROM restrictions since 7/28/2021 surgery for quadriceps plasty and pat ORIF (intrasurgical fx).  Had surgery due to ongoing ROM restrictions following 12/21/2021 surgery for tib and pat ORIF.  PMH: L femur ORIF 4/2018 and l GISSELL 10/2019, L L/E lymph edema.  MD order date 8/24/2021..         Type of problem:  Left knee.    This is a new condition.  Condition occurred with:  A fall/slip and other reason.  Where condition occurred: other.  Patient reports pain:  Anterior (ache vs pain).  Pain is described as aching and is constant.  Pain is the same all the time.  Since onset symptoms are unchanged.  Associated symptoms:  Loss of motion/stiffness and loss of strength. Symptoms are exacerbated by descending stairs, ascending stairs, bending/squatting, transfers, standing and walking  and relieved by rest.      Barriers include:  None as reported by patient.                        Objective:    Gait:  Brace unlocked 0-40*  Gait Type:  Antalgic   Assistive Devices:  Cane and brace                                                        Knee Evaluation:  ROM:    AROM      Extension: Left: 0     Right:   Flexion: Left: 20   Right:  PROM      Extension: Left: 0   Right:   Flexion: Left: 28   Right:       Strength:         Quad Set Left:  Fair    Pain: +/-           Edema:  Edema of the knee: moderate L L/E swelling noted.            General     ROS    Assessment/Plan:    Patient is a 71 year old female with left side knee complaints.    Patient has the following significant findings with corresponding treatment plan.                Diagnosis 1:    Pain -  hot/cold therapy and home program  Decreased ROM/flexibility - manual therapy and therapeutic exercise  Decreased strength - therapeutic exercise and therapeutic activities  Decreased proprioception - neuro re-education and therapeutic activities  Impaired gait - gait training  Impaired muscle performance - neuro re-education  Decreased function - therapeutic activities    Therapy Evaluation Codes:   Cumulative Therapy Evaluation is: Low complexity.    Previous and current functional limitations:  (See Goal Flow Sheet for this information)    Short term and Long term goals: (See Goal Flow Sheet for this information)     Communication ability:  Patient appears to be able to clearly communicate and understand verbal and written communication and follow directions correctly.  Treatment Explanation - The following has been discussed with the patient:   RX ordered/plan of care  Anticipated outcomes  Possible risks and side effects  This patient would benefit from PT intervention to resume normal activities.   Rehab potential is good.    Frequency:   X week, once daily  Duration:  for 4 weeks tapering to 1 X a week over 4 weeks  Discharge Plan:  Achieve all LTG.  Independent in home treatment program.  Reach maximal therapeutic benefit.    Please refer to the daily flowsheet for treatment today, total treatment time and time spent performing 1:1 timed codes.

## 2021-09-16 ENCOUNTER — THERAPY VISIT (OUTPATIENT)
Dept: PHYSICAL THERAPY | Facility: CLINIC | Age: 71
End: 2021-09-16
Payer: COMMERCIAL

## 2021-09-16 DIAGNOSIS — G89.29 CHRONIC PAIN OF LEFT KNEE: ICD-10-CM

## 2021-09-16 DIAGNOSIS — Z98.890 S/P ORIF (OPEN REDUCTION INTERNAL FIXATION) FRACTURE: ICD-10-CM

## 2021-09-16 DIAGNOSIS — Z87.81 S/P ORIF (OPEN REDUCTION INTERNAL FIXATION) FRACTURE: ICD-10-CM

## 2021-09-16 DIAGNOSIS — M25.562 CHRONIC PAIN OF LEFT KNEE: ICD-10-CM

## 2021-09-16 PROCEDURE — 97110 THERAPEUTIC EXERCISES: CPT | Mod: GP | Performed by: PHYSICAL THERAPIST

## 2021-09-20 ENCOUNTER — TELEPHONE (OUTPATIENT)
Dept: CARDIOLOGY | Facility: CLINIC | Age: 71
End: 2021-09-20

## 2021-09-20 NOTE — TELEPHONE ENCOUNTER
Received call from pt reporting she gave herself her second Repatha injection on 9/18/21 and then yesterday 9/19/21 she started experiencing low grade fever, body aches and chills. Pt said her temperature was 99 degrees F at the highest but has come down although she is still not feeling well. Pt denied any recent sick contacts and said she and her  are vaccinated for COVID. Pt said she is not sure if her symptoms could be from the Repatha or not. Pt did not have these symptoms with her first injection. Reviewed these are not common side effects of Repatha but will review with Dr. Wilder and call back with recommendations. Reviewed pt should see her PCP if worsening symptoms. Pt verbalized understanding and agreement with plan.

## 2021-09-21 ENCOUNTER — VIRTUAL VISIT (OUTPATIENT)
Dept: SURGERY | Facility: CLINIC | Age: 71
End: 2021-09-21
Attending: INTERNAL MEDICINE
Payer: COMMERCIAL

## 2021-09-21 ENCOUNTER — MYC MEDICAL ADVICE (OUTPATIENT)
Dept: PHYSICAL THERAPY | Facility: CLINIC | Age: 71
End: 2021-09-21

## 2021-09-21 ENCOUNTER — MYC MEDICAL ADVICE (OUTPATIENT)
Dept: FAMILY MEDICINE | Facility: CLINIC | Age: 71
End: 2021-09-21

## 2021-09-21 VITALS — HEIGHT: 67 IN | BODY MASS INDEX: 22.44 KG/M2 | WEIGHT: 143 LBS

## 2021-09-21 DIAGNOSIS — C49.9 MYXOID LIPOSARCOMA (H): ICD-10-CM

## 2021-09-21 DIAGNOSIS — Z95.828 HISTORY OF ARTERIAL BYPASS OF LOWER EXTREMITY: ICD-10-CM

## 2021-09-21 DIAGNOSIS — R93.1 AGATSTON CORONARY ARTERY CALCIUM SCORE GREATER THAN 400: ICD-10-CM

## 2021-09-21 DIAGNOSIS — E78.5 HYPERLIPIDEMIA LDL GOAL <70: ICD-10-CM

## 2021-09-21 DIAGNOSIS — E03.9 HYPOTHYROIDISM, UNSPECIFIED TYPE: ICD-10-CM

## 2021-09-21 DIAGNOSIS — I89.0 LYMPHEDEMA OF LEFT LEG: Primary | ICD-10-CM

## 2021-09-21 PROCEDURE — 99213 OFFICE O/P EST LOW 20 MIN: CPT | Mod: 95 | Performed by: INTERNAL MEDICINE

## 2021-09-21 RX ORDER — ROSUVASTATIN CALCIUM 20 MG/1
20 TABLET, COATED ORAL DAILY
Qty: 90 TABLET | Refills: 3 | Status: SHIPPED | OUTPATIENT
Start: 2021-09-21 | End: 2022-09-20

## 2021-09-21 ASSESSMENT — MIFFLIN-ST. JEOR: SCORE: 1188.33

## 2021-09-21 NOTE — TELEPHONE ENCOUNTER
Received response from Dr. Wilder:     Spencer Wilder MD Hair, Ashley W RN  Caller: Unspecified (Yesterday,  1:51 PM)  Raudel King.  I agree with everything you wrote.  I think it is possible that it's related, but definitely not certain.  If she was having issues like hives, throat swelling, etc... as well, then I'd say we should stop the injections.  If not, I'd vote that we continue with the injections and see how it goes next time.  If it keeps happening and/or worsens, then we'll have to think about stopping.     Thanks,   Chris     Called back and spoke with pt, reviewed Dr. Wilder's recommendations. Pt confirmed she is not having any hives or throat swelling. Pt verbalized understanding and agreement with plan to continue on Repatha for now and let us know if her symptoms recur or worsen.

## 2021-09-21 NOTE — PATIENT INSTRUCTIONS
1.  Recent lipids are excellent, continue Repatha, Crestor and Zetia    2.  Ambulate as much as you can    3.  Follow-up with me in the clinic or virtual visit in 6 months, prior to visit will get fasting lipids, CMP, TSH lab tests    4.  Refilled Crestor

## 2021-09-21 NOTE — PROGRESS NOTES
Provider visit note:     Chief complaint:  Follow up visit  Review of labs  Multiple concerns  Abnormal ct coronary calcium 3900 in July 2021 seen cards started repatha last month tolerating   Using CPM machine after leg leg surgery  History of closed fracture of left leg area underwent surgery still need redo surgery sometime in the near future    History of present illness:  This is a very pleasant 71-year-old female with history of multiple medical problems including the known history for peripheral arterial disease status post arterial bypass of left lower extremity, lymphedema of left lower extremity, hypertension, hyperlipidemia with recent coronary CT calcium score 3900 initiated Repatha now lipids are in excellent control tolerating medications.  She is using CPM machine for leg rehab.  Denies any chest pain, shortness of breath or palpitations    Reviewed recent labs with the patient    Review of systems: Reviewed all 12 point review of systems as per HPI otherwise unremarkable    Physical exam:( no physical exam done this is virtual visit)    Reviewed recent laboratory tests, imaging studies in the epic and updated chart    Assessment and plan:    1. Lymphedema of left leg  Continue to utilize compression, wraps, elevation of the leg and follow previously given recommendations by lymphedema therapist  - Follow-Up with Vascular Medicine    2.  PAD with History of arterial bypass of Left lower extremity  Stable optimize risk factors her blood pressure is well controlled and she is taking multiple lipid-lowering medications recent lipids are excellent range continue the same continue Plavix  - Follow-Up with Vascular Medicine    3. 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  Follow-up with [facility] periodically      4. Hyperlipidemia LDL goal <70  She is taking Zetia, Crestor, Repatha recent LDL and ratios are excellent range continue the same repeat lipid panel, CMP in 6  months prior to next visit  - Follow-Up with Vascular Medicine    5. Hypothyroidism, unspecified type  On replacement clinically euthyroid continue the same recent thyroid function tests are normal  - Follow-Up with Vascular Medicine    This visit is being conducted as a virtual visit due to the emphasis on mitigation of the COVID-19 virus pandemic. The clinician has decided that the risk of an in-office visit outweighs the benefit for this patient.     22 minutes spent on the date of the visit reviewing recent labs, imaging studies, outside evaluation, history and documentation etc..    Tiesha Prince MD, JASON, [facility]  Vascular Medicine  Clinical lipidologist

## 2021-09-22 NOTE — PROGRESS NOTES
PVP NOT COMPLETED.  - Justin Xiong PA-C on 9/23/2021 at 1:05 PM        Pre-Visit Planning   Next 5 appointments (look out 90 days)    Sep 23, 2021  9:50 AM  (Arrive by 9:30 AM)  Office Visit with Justin Xiong PA-C  [facility] ([facility] [place] ) [address] [facility] [id number] [phone]        Appointment Notes for this encounter:   pain behind ear (see RN note 9/22)    Questionnaires Reviewed/Assigned  No additional questionnaires are needed         Patient preferred phone number: [phone]    Spoke to patient via phone. Are there any additional questions or concerns you'd like to review with your provider during your visit?      Visit is not preventive.    Meds  Is there anything on your medication list that needs to be updated?     Current Outpatient Medications   Medication     acetaminophen (TYLENOL) 325 MG tablet     alendronate (FOSAMAX) 70 MG tablet     aspirin (ASA) 81 MG chewable tablet     calcium carbonate 600 mg-vitamin D 400 units (CALTRATE) 600-400 MG-UNIT per tablet     clopidogrel (PLAVIX) 75 MG tablet     evolocumab (REPATHA) 140 MG/ML prefilled autoinjector     ezetimibe (ZETIA) 10 MG tablet     fesoterodine fumarate (TOVIAZ) 4 MG TB24 24 hr tablet     levothyroxine (SYNTHROID/LEVOTHROID) 75 MCG tablet     multivitamin, therapeutic (THERA-VIT) TABS tablet     nitroFURantoin macrocrystal-monohydrate (MACROBID) 100 MG capsule     order for DME     ORDER FOR DME     rosuvastatin (CRESTOR) 20 MG tablet     Vitamin D3 (CHOLECALCIFEROL) 25 mcg (1000 units) tablet     No current facility-administered medications for this visit.     Which pharmacy do you prefer to use for medications during this visit if needed? [facility] [place], [place] - [address] [facility]     Do you need refills on any of your medications?     Health Maintenance Due   Topic Date Due     URINE DRUG SCREEN  Never done     COLORECTAL CANCER SCREENING   "02/05/2021     INFLUENZA VACCINE (1) 09/01/2021     ANNUAL REVIEW OF HM ORDERS  09/09/2021     FALL RISK ASSESSMENT  09/09/2021     MEDICARE ANNUAL WELLNESS VISIT  09/09/2021     DEXA  09/30/2021     MAMMO SCREENING  10/08/2021     [employer]  Patient is active on [employer].    Questionnaire Review   N/A @ this time    Call Summary  \"Thank you for your time today.  If anything comes up before your appointment, please feel free to contact us at [phone].\"    Nancy Coon, Registered Nurse, PAL (Patient Advocate Liaison)   [facility]     [phone]    "

## 2021-09-22 NOTE — TELEPHONE ENCOUNTER
S-(situation):pain behind L ear.  See mychart note    B-(background): pt states she has had this pain before last year but it went away on own.    A-(assessment): pt states pain was a 10 last night took Ty and now down to about 6 when it spasms.  This is not assoc with her shoulder which PT is currently seeing pt for at this time.      R-(recommendations): pt should be seen.  Appt made with Justin DOTSON for tomorrow.  Pt advised to apply heat or ice which ever feels better.  Take Tylenol if needed do not exceed 3000mg day.      Althea Mccloud. RN, BSN, PAL (Patient Advocate Liaison)  [facility]   [phone]

## 2021-09-23 ENCOUNTER — HOSPITAL ENCOUNTER (OUTPATIENT)
Dept: OCCUPATIONAL THERAPY | Facility: CLINIC | Age: 71
Setting detail: THERAPIES SERIES
End: 2021-09-23
Attending: ORTHOPAEDIC SURGERY
Payer: COMMERCIAL

## 2021-09-23 ENCOUNTER — OFFICE VISIT (OUTPATIENT)
Dept: FAMILY MEDICINE | Facility: CLINIC | Age: 71
End: 2021-09-23
Payer: COMMERCIAL

## 2021-09-23 ENCOUNTER — TRANSFERRED RECORDS (OUTPATIENT)
Dept: HEALTH INFORMATION MANAGEMENT | Facility: CLINIC | Age: 71
End: 2021-09-23

## 2021-09-23 VITALS
SYSTOLIC BLOOD PRESSURE: 100 MMHG | BODY MASS INDEX: 22.74 KG/M2 | TEMPERATURE: 97.9 F | DIASTOLIC BLOOD PRESSURE: 62 MMHG | WEIGHT: 143 LBS | OXYGEN SATURATION: 96 % | RESPIRATION RATE: 12 BRPM | HEART RATE: 91 BPM

## 2021-09-23 DIAGNOSIS — H92.02 POSTERIOR AURICULAR PAIN OF LEFT EAR: Primary | ICD-10-CM

## 2021-09-23 LAB
BASOPHILS # BLD AUTO: 0 10E3/UL (ref 0–0.2)
BASOPHILS NFR BLD AUTO: 0 %
EOSINOPHIL # BLD AUTO: 0.1 10E3/UL (ref 0–0.7)
EOSINOPHIL NFR BLD AUTO: 1 %
ERYTHROCYTE [DISTWIDTH] IN BLOOD BY AUTOMATED COUNT: 14.3 % (ref 10–15)
ERYTHROCYTE [SEDIMENTATION RATE] IN BLOOD BY WESTERGREN METHOD: 68 MM/HR (ref 0–30)
HCT VFR BLD AUTO: 37.7 % (ref 35–47)
HGB BLD-MCNC: 12 G/DL (ref 11.7–15.7)
LYMPHOCYTES # BLD AUTO: 1.4 10E3/UL (ref 0.8–5.3)
LYMPHOCYTES NFR BLD AUTO: 20 %
MCH RBC QN AUTO: 28.2 PG (ref 26.5–33)
MCHC RBC AUTO-ENTMCNC: 31.8 G/DL (ref 31.5–36.5)
MCV RBC AUTO: 89 FL (ref 78–100)
MONOCYTES # BLD AUTO: 0.6 10E3/UL (ref 0–1.3)
MONOCYTES NFR BLD AUTO: 8 %
NEUTROPHILS # BLD AUTO: 5.1 10E3/UL (ref 1.6–8.3)
NEUTROPHILS NFR BLD AUTO: 70 %
PLATELET # BLD AUTO: 290 10E3/UL (ref 150–450)
RBC # BLD AUTO: 4.26 10E6/UL (ref 3.8–5.2)
WBC # BLD AUTO: 7.2 10E3/UL (ref 4–11)

## 2021-09-23 PROCEDURE — 80061 LIPID PANEL: CPT | Performed by: PHYSICIAN ASSISTANT

## 2021-09-23 PROCEDURE — 86140 C-REACTIVE PROTEIN: CPT | Performed by: PHYSICIAN ASSISTANT

## 2021-09-23 PROCEDURE — 80053 COMPREHEN METABOLIC PANEL: CPT | Performed by: PHYSICIAN ASSISTANT

## 2021-09-23 PROCEDURE — 84443 ASSAY THYROID STIM HORMONE: CPT | Performed by: PHYSICIAN ASSISTANT

## 2021-09-23 PROCEDURE — 36415 COLL VENOUS BLD VENIPUNCTURE: CPT | Performed by: PHYSICIAN ASSISTANT

## 2021-09-23 PROCEDURE — 99213 OFFICE O/P EST LOW 20 MIN: CPT | Performed by: PHYSICIAN ASSISTANT

## 2021-09-23 PROCEDURE — 85025 COMPLETE CBC W/AUTO DIFF WBC: CPT | Performed by: PHYSICIAN ASSISTANT

## 2021-09-23 PROCEDURE — 97140 MANUAL THERAPY 1/> REGIONS: CPT | Mod: GO | Performed by: OCCUPATIONAL THERAPIST

## 2021-09-23 PROCEDURE — 85652 RBC SED RATE AUTOMATED: CPT | Performed by: PHYSICIAN ASSISTANT

## 2021-09-23 NOTE — PROGRESS NOTES
OUTPATIENT OCCUPATIONAL THERAPY  PLAN OF TREATMENT FOR OUTPATIENT REHABILITATION AND PROGRESS NOTE    Patient's Last Name, First Name, Angeli Pond Date of Birth  1950   Provider's Name  [facility] Medical Record No.  [id number]    Onset Date  9/17/20 Start of Care Date  9/17/18   Type:     __PT   _X_OT   __SLP Medical Diagnosis  Lymphedema   OT Diagnosis  Lymphedema Plan of Treatment  Frequency/Duration: 1x/week for one week  Certification date from 9/16/21 to 9/24/21     Goals:  Goal Identifier 1   Goal Description Pt will be independent with modifications to home program.   Target Date 06/25/21   Date Met  05/19/21   Progress (detail required for progress note):       Goal Identifier 2   Goal Description Pt will decrease fullness and the tightness in the leg -.8L to sit in chair with better knee bend and get closer to the table.   Target Date 06/25/21   Date Met   (not met)   Progress (detail required for progress note):       Goal Identifier 3   Goal Description Pt will get into/out of car with greater ease with less knee fluid.   Target Date 06/25/21   Date Met   (goal is not met)   Progress (detail required for progress note):       Pt has been seen for 35 sessions since SOC.  Today is her discharge, but was delayed one week from original cert.    Outcome Measures (Most Recent Score):    Lymphedema Life Impact Scale (score range 0-72). A higher score indicates greater impairment.: 13    Objective Measurements:       L>R 10.9%       I CERTIFY THE NEED FOR THESE SERVICES FURNISHED UNDER        THIS PLAN OF TREATMENT AND WHILE UNDER MY CARE     (Physician co-signature of this document indicates review and certification of the therapy plan).                Referring Provider: COURTNEY Peña, OT

## 2021-09-23 NOTE — PROGRESS NOTES
Outpatient Occupational Therapy Discharge Note     Patient: Angeli Jacobson  : 1950    Beginning/End Dates of Reporting Period:  20 to 21    Referring Provider: Sepideh Burris PA-C    Therapy Diagnosis: Lymphedema    Client Self Report:      Objective Measurements:    L>R 10.9%, an improvement from 22% SOC.  Her leg has had multiple traumas that kept flaring it.  Her best msmt was L>R 5%    Outcome Measures (most recent score):  Lymphedema Life Impact Scale (score range 0-72). A higher score indicates greater impairment.: 13, an improvement from 27    Goals:   Goal Identifier 1   Goal Description Pt will be independent with modifications to home program.   Target Date 21   Date Met  21   Progress (detail required for progress note):  Goal met     Goal Identifier 2   Goal Description Pt will decrease fullness and the tightness in the leg -.8L to sit in chair with better knee bend and get closer to the table.   Target Date 21   Date Met   (not met)   Progress (detail required for progress note):       Goal Identifier 3   Goal Description Pt will get into/out of car with greater ease with less knee fluid.   Target Date 21   Date Met   (goal is not met)   Progress (detail required for progress note):       Pt was seen for 35 sessions since SOC. She has had multiple traumas, surgeries.  The leg would flare and each time she would improve and have a better home program.  This last surgery she did not flare. She has one more surgery and is expected to do well with her home program.  She did not yet meet the goal to get in/out of car or up to table.  The surgeries are working to improve the knee flexion. We first thought getting the swelling out would improve the knee flexion, but eventually had to shift gears to get the fluid out to prevent infections with the surgeries and then to manage the flares.  She is doing so well at this point in managing her lymphedema and also her  flares.    Plan:  Discharge from therapy.    Discharge:    Reason for Discharge: No further expectation of progress.  We had hoped reduction of the leg would lead to better knee movement.  She eventually needed the reduction to decrease her risk of infection for surgeries that impact the knee ROM.  She is now independent with her lymphedema mgmt    Equipment Issued: DONNY, garyoly    Discharge Plan: Patient to continue home program.

## 2021-09-23 NOTE — PATIENT INSTRUCTIONS
Take the complete course of the antibiotic.    Take probiotic or eat yogurt with it to avoid diarrhea.    Continue to take 1000 mg of Tylenol 3-4 times a day as needed.

## 2021-09-23 NOTE — PROGRESS NOTES
Assessment & Plan     Posterior auricular pain of left ear    Unclear cause. Treat with antibiotics for possible infection. She tried Flexeril and oxycodone that she had at home without improvement. Tylenol is most helpful so continue that. Follow-up if not improving as expected.    - CBC with platelets and differential; Future  - ESR: Erythrocyte sedimentation rate; Future  - CRP, inflammation; Future  - amoxicillin-clavulanate (AUGMENTIN) 875-125 MG tablet; Take 1 tablet by mouth 2 times daily for 14 days  - CBC with platelets and differential  - ESR: Erythrocyte sedimentation rate  - CRP, inflammation                 Patient Instructions   Take the complete course of the antibiotic.    Take probiotic or eat yogurt with it to avoid diarrhea.    Continue to take 1000 mg of Tylenol 3-4 times a day as needed.               No follow-ups on file.    COURTNEY Painter [facility]    Carmel Suh is a 71 year old who presents for the following health issues     HPI     Concern - Pain behind L ear  Onset: 4 days  Description: pain behind left ear, described as a spasm sharp pinch behind ear, lasts a second then goes away, keeps happening throughout the day, will wake up from this pain  Intensity: severe- 10/10 pain when spasms occur (about a minute apart and last for 1-2 seconds)  Progression of Symptoms:  intermittent  Accompanying Signs & Symptoms: none  Previous history of similar problem: last September 2020 same thing-Did go to PT for the problem and did help  Precipitating factors:        Worsened by: being active  Alleviating factors:        Improved by: not being active, lying down still   Therapies tried and outcome: extra strength Tylenol - helps, or being inactive helps the frequency goes down    Had some sinus drainage the day before this started and had a low grade fever with this.      Review of Systems   Constitutional, HEENT, cardiovascular, pulmonary, gi and  gu systems are negative, except as otherwise noted.        Objective    /62 (BP Location: Right arm, Patient Position: Chair, Cuff Size: Adult Regular)   Pulse 91   Temp 97.9  F (36.6  C) (Oral)   Resp 12   Wt 64.9 kg (143 lb)   LMP 03/18/2005   SpO2 96%   BMI 22.74 kg/m    Body mass index is 22.74 kg/m .       Physical Exam   GENERAL: healthy, alert and no distress  EYES: Eyes grossly normal to inspection, PERRL and conjunctivae and sclerae normal  HENT: ear canals and TM's normal, nose and mouth without ulcers or lesions  NECK: no adenopathy, no asymmetry, masses, or scars and thyroid normal to palpation  RESP: lungs clear to auscultation - no rales, rhonchi or wheezes  CV: regular rate and rhythm, normal S1 S2, no S3 or S4, no murmur, click or rub, no peripheral edema and peripheral pulses strong  MS: no gross musculoskeletal defects noted, no edema  SKIN: no suspicious lesions or rashes  NEURO: Normal strength and tone, mentation intact and speech normal  PSYCH: mentation appears normal, affect normal/bright

## 2021-09-24 ENCOUNTER — THERAPY VISIT (OUTPATIENT)
Dept: PHYSICAL THERAPY | Facility: CLINIC | Age: 71
End: 2021-09-24
Payer: COMMERCIAL

## 2021-09-24 DIAGNOSIS — G89.29 CHRONIC PAIN OF LEFT KNEE: ICD-10-CM

## 2021-09-24 DIAGNOSIS — Z98.890 S/P ORIF (OPEN REDUCTION INTERNAL FIXATION) FRACTURE: ICD-10-CM

## 2021-09-24 DIAGNOSIS — Z87.81 S/P ORIF (OPEN REDUCTION INTERNAL FIXATION) FRACTURE: ICD-10-CM

## 2021-09-24 DIAGNOSIS — M25.562 CHRONIC PAIN OF LEFT KNEE: ICD-10-CM

## 2021-09-24 LAB
ALBUMIN SERPL-MCNC: 3.2 G/DL (ref 3.4–5)
ALP SERPL-CCNC: 90 U/L (ref 40–150)
ALT SERPL W P-5'-P-CCNC: 27 U/L (ref 0–50)
ANION GAP SERPL CALCULATED.3IONS-SCNC: <1 MMOL/L (ref 3–14)
AST SERPL W P-5'-P-CCNC: 19 U/L (ref 0–45)
BILIRUB SERPL-MCNC: 0.2 MG/DL (ref 0.2–1.3)
BUN SERPL-MCNC: 9 MG/DL (ref 7–30)
CALCIUM SERPL-MCNC: 9 MG/DL (ref 8.5–10.1)
CHLORIDE BLD-SCNC: 108 MMOL/L (ref 94–109)
CHOLEST SERPL-MCNC: 125 MG/DL
CO2 SERPL-SCNC: 29 MMOL/L (ref 20–32)
CREAT SERPL-MCNC: 0.63 MG/DL (ref 0.52–1.04)
CRP SERPL-MCNC: 140 MG/L (ref 0–8)
FASTING STATUS PATIENT QL REPORTED: NO
GFR SERPL CREATININE-BSD FRML MDRD: >90 ML/MIN/1.73M2
GLUCOSE BLD-MCNC: 91 MG/DL (ref 70–99)
HDLC SERPL-MCNC: 48 MG/DL
LDLC SERPL CALC-MCNC: 61 MG/DL
NONHDLC SERPL-MCNC: 77 MG/DL
POTASSIUM BLD-SCNC: 4.2 MMOL/L (ref 3.4–5.3)
PROT SERPL-MCNC: 7.8 G/DL (ref 6.8–8.8)
SODIUM SERPL-SCNC: 137 MMOL/L (ref 133–144)
TRIGL SERPL-MCNC: 79 MG/DL
TSH SERPL DL<=0.005 MIU/L-ACNC: 0.79 MU/L (ref 0.4–4)

## 2021-09-24 PROCEDURE — 97110 THERAPEUTIC EXERCISES: CPT | Mod: GP | Performed by: PHYSICAL THERAPIST

## 2021-09-28 ENCOUNTER — OFFICE VISIT (OUTPATIENT)
Dept: FAMILY MEDICINE | Facility: CLINIC | Age: 71
End: 2021-09-28
Payer: COMMERCIAL

## 2021-09-28 VITALS
BODY MASS INDEX: 23.07 KG/M2 | TEMPERATURE: 97.7 F | HEART RATE: 80 BPM | HEIGHT: 67 IN | DIASTOLIC BLOOD PRESSURE: 76 MMHG | WEIGHT: 147 LBS | OXYGEN SATURATION: 98 % | RESPIRATION RATE: 16 BRPM | SYSTOLIC BLOOD PRESSURE: 129 MMHG

## 2021-09-28 DIAGNOSIS — L03.116 CELLULITIS OF LEFT LOWER EXTREMITY: Primary | ICD-10-CM

## 2021-09-28 DIAGNOSIS — M62.838 NECK MUSCLE SPASM: ICD-10-CM

## 2021-09-28 DIAGNOSIS — H92.02 POSTERIOR AURICULAR PAIN OF LEFT EAR: ICD-10-CM

## 2021-09-28 LAB
CRP SERPL-MCNC: 17 MG/L (ref 0–8)
ERYTHROCYTE [SEDIMENTATION RATE] IN BLOOD BY WESTERGREN METHOD: 53 MM/HR (ref 0–30)

## 2021-09-28 PROCEDURE — 86140 C-REACTIVE PROTEIN: CPT | Performed by: PHYSICIAN ASSISTANT

## 2021-09-28 PROCEDURE — 85652 RBC SED RATE AUTOMATED: CPT | Performed by: PHYSICIAN ASSISTANT

## 2021-09-28 PROCEDURE — 36415 COLL VENOUS BLD VENIPUNCTURE: CPT | Performed by: PHYSICIAN ASSISTANT

## 2021-09-28 PROCEDURE — 99214 OFFICE O/P EST MOD 30 MIN: CPT | Performed by: PHYSICIAN ASSISTANT

## 2021-09-28 RX ORDER — TIZANIDINE 2 MG/1
2-4 TABLET ORAL 3 TIMES DAILY PRN
Qty: 30 TABLET | Refills: 0 | Status: SHIPPED | OUTPATIENT
Start: 2021-09-28 | End: 2021-11-24

## 2021-09-28 ASSESSMENT — MIFFLIN-ST. JEOR: SCORE: 1206.48

## 2021-09-28 NOTE — PROGRESS NOTES
Assessment & Plan     Cellulitis of left lower extremity  Improving. Continue Augmentin. Follow up in 2 weeks.  ESR still elevated but improved.  Repeat ESR and CRP at that time.     Neck muscle spasm  Switched from Flexeril to tizanidine. Continue conservative treatment as needed.  - tiZANidine (ZANAFLEX) 2 MG tablet; Take 1-2 tablets (2-4 mg) by mouth 3 times daily as needed for muscle spasms    Posterior auricular pain of left ear    - tiZANidine (ZANAFLEX) 2 MG tablet; Take 1-2 tablets (2-4 mg) by mouth 3 times daily as needed for muscle spasms    Offered to order colonoscopy. Patient would like to wait for now.    Review of external notes as documented elsewhere in note  Review of the result(s) of each unique test - ESR  Prescription drug management  39 minutes spent on the date of the encounter doing chart review, history and exam, documentation and further activities per the note      Return in about 2 weeks (around 10/12/2021) for Follow up cellulitis, In Person (PCP or Muriel or Justin).    Muriel Orosco PA-C  [facility]    Repeat ESR and CRP at follow up    Carmel Suh is a 71 year old who presents for the following health issues  accompanied by her spouse, Chris:    HPI     Concern - infection  Onset: this is a f/u appt  Description: left leg  Intensity: moderate  Progression of Symptoms:  improving  Accompanying Signs & Symptoms: red, warm spot on left thigh    Patient is a 71 soumya old female who presents today for follow up. The patient was seen last week for pain behind the left ear. The pain is quite severe 10/10 in severity. ESR, CRP and CBC all obtained. Inflammatory markers are elevated. Patient started on Augmentin. She has had improvement of her ear pain but is now having redness and hot to touch in the left upper thigh (where she previously had a sarcoma surger in 2000- following surgery patient did have infection in the area and also required an  "arterial bypass for which she follows vascular surgery, also history of lymphedema for which she see OT).    Patient notes the spasms in left ear are improving. She has been using topical ice, Tylenol and Flexeril (not helpful). She feels like symptoms are improving and she is managing.    Patient is most worried about the redness and warmth of the left upper leg where she previously had removal of a sarcoma. She had an infection in the area in 2006.    She first noticed it at a lymphedema appointment and noticed redness and warmth about 5 days ago. She has had improvement since starting the Augmentin last week (a total of 14 days treatment). She notes she was having some chills, no known fever.     Of note patient recently started on Repatha. Unsure if symptoms started due to medication. Cardiology does not believe so.      Review of Systems   GENERAL:  No fevers  MUSCULOSKELETAL: As noted in HPI          Objective    /76 (BP Location: Right arm, Patient Position: Chair, Cuff Size: Adult Regular)   Pulse 80   Temp 97.7  F (36.5  C) (Oral)   Resp 16   Ht 1.689 m (5' 6.5\")   Wt 66.7 kg (147 lb)   LMP 03/18/2005   SpO2 98%   BMI 23.37 kg/m    Body mass index is 23.37 kg/m .  Physical Exam   GENERAL: No acute distress  HEENT: Normocephalic  SKIN: Left upper thigh with scar related to previous surgery, mild erythema medial to the scar and minimally warm to touch, no tenderness.  NEURO: Alert and non-focal      Results for orders placed or performed in visit on 09/28/21 (from the past 24 hour(s))   ESR: Erythrocyte sedimentation rate   Result Value Ref Range    Erythrocyte Sedimentation Rate 53 (H) 0 - 30 mm/hr     *Note: Due to a large number of results and/or encounters for the requested time period, some results have not been displayed. A complete set of results can be found in Results Review.             "

## 2021-09-29 ENCOUNTER — TRANSCRIBE ORDERS (OUTPATIENT)
Dept: FAMILY MEDICINE | Facility: CLINIC | Age: 71
End: 2021-09-29

## 2021-09-30 ENCOUNTER — THERAPY VISIT (OUTPATIENT)
Dept: PHYSICAL THERAPY | Facility: CLINIC | Age: 71
End: 2021-09-30
Payer: COMMERCIAL

## 2021-09-30 DIAGNOSIS — M25.562 CHRONIC PAIN OF LEFT KNEE: ICD-10-CM

## 2021-09-30 DIAGNOSIS — Z87.81 S/P ORIF (OPEN REDUCTION INTERNAL FIXATION) FRACTURE: ICD-10-CM

## 2021-09-30 DIAGNOSIS — G89.29 CHRONIC PAIN OF LEFT KNEE: ICD-10-CM

## 2021-09-30 DIAGNOSIS — Z98.890 S/P ORIF (OPEN REDUCTION INTERNAL FIXATION) FRACTURE: ICD-10-CM

## 2021-09-30 PROCEDURE — 97110 THERAPEUTIC EXERCISES: CPT | Mod: GP | Performed by: PHYSICAL THERAPIST

## 2021-10-07 ENCOUNTER — THERAPY VISIT (OUTPATIENT)
Dept: PHYSICAL THERAPY | Facility: CLINIC | Age: 71
End: 2021-10-07
Payer: COMMERCIAL

## 2021-10-07 DIAGNOSIS — M25.562 CHRONIC PAIN OF LEFT KNEE: ICD-10-CM

## 2021-10-07 DIAGNOSIS — Z87.81 S/P ORIF (OPEN REDUCTION INTERNAL FIXATION) FRACTURE: ICD-10-CM

## 2021-10-07 DIAGNOSIS — Z98.890 S/P ORIF (OPEN REDUCTION INTERNAL FIXATION) FRACTURE: ICD-10-CM

## 2021-10-07 DIAGNOSIS — G89.29 CHRONIC PAIN OF LEFT KNEE: ICD-10-CM

## 2021-10-07 PROCEDURE — 97110 THERAPEUTIC EXERCISES: CPT | Mod: GP | Performed by: PHYSICAL THERAPIST

## 2021-10-11 NOTE — PROGRESS NOTES
Here for Left leg angio. NPO, allergies noted. No fall risk. Lungs CTA, S1S2. LLE pulses DP 1+, PT doppler, RLE DP 3+. PT 2+. Reviewed procedure, pt states understanding. Reviewed contrast information, pt states understanding.   Patient unable to complete

## 2021-10-13 ENCOUNTER — MYC MEDICAL ADVICE (OUTPATIENT)
Dept: FAMILY MEDICINE | Facility: CLINIC | Age: 71
End: 2021-10-13

## 2021-10-13 NOTE — PROGRESS NOTES
Pre-Visit Planning   Next 5 appointments (look out 90 days)    Oct 14, 2021  4:00 PM  (Arrive by 3:40 PM)  Office Visit with Sepideh Bruris PA-C  [facility] ([facility] ) [address] [place] [id number] [phone]        Appointment Notes for this encounter:   follow up cellulitis    Cellulitis of left lower extremity  9/28/2021 from OV with Muriel DOTSON   Improving. Continue Augmentin. Follow up in 2 weeks.  ESR still elevated but improved.  Repeat ESR and CRP at that time.     Followed by PT for s/p ORIF L  Cardiology last 08/09/21  Questionnaires Reviewed/Assigned  No additional questionnaires are needed  956}  Patient preferred phone number: [phone]    Unable to reach. Left voicemail. Advised patient to call clinic back at [phone]  Althea Mccloud. RN, BSN, PAL (Patient Advocate Liaison)  [facility]   [phone]    .

## 2021-10-14 ENCOUNTER — THERAPY VISIT (OUTPATIENT)
Dept: PHYSICAL THERAPY | Facility: CLINIC | Age: 71
End: 2021-10-14
Payer: COMMERCIAL

## 2021-10-14 ENCOUNTER — OFFICE VISIT (OUTPATIENT)
Dept: FAMILY MEDICINE | Facility: CLINIC | Age: 71
End: 2021-10-14
Payer: COMMERCIAL

## 2021-10-14 VITALS
HEART RATE: 80 BPM | DIASTOLIC BLOOD PRESSURE: 72 MMHG | BODY MASS INDEX: 23.43 KG/M2 | WEIGHT: 147.4 LBS | TEMPERATURE: 98 F | OXYGEN SATURATION: 95 % | SYSTOLIC BLOOD PRESSURE: 110 MMHG

## 2021-10-14 DIAGNOSIS — Z87.81 S/P ORIF (OPEN REDUCTION INTERNAL FIXATION) FRACTURE: ICD-10-CM

## 2021-10-14 DIAGNOSIS — Z98.890 S/P ORIF (OPEN REDUCTION INTERNAL FIXATION) FRACTURE: ICD-10-CM

## 2021-10-14 DIAGNOSIS — L03.116 CELLULITIS OF LEFT LOWER EXTREMITY: Primary | ICD-10-CM

## 2021-10-14 DIAGNOSIS — M25.562 CHRONIC PAIN OF LEFT KNEE: ICD-10-CM

## 2021-10-14 DIAGNOSIS — G89.29 CHRONIC PAIN OF LEFT KNEE: ICD-10-CM

## 2021-10-14 DIAGNOSIS — Z23 NEED FOR PROPHYLACTIC VACCINATION AND INOCULATION AGAINST INFLUENZA: ICD-10-CM

## 2021-10-14 PROCEDURE — 99214 OFFICE O/P EST MOD 30 MIN: CPT | Mod: 25 | Performed by: PHYSICIAN ASSISTANT

## 2021-10-14 PROCEDURE — 90471 IMMUNIZATION ADMIN: CPT | Performed by: PHYSICIAN ASSISTANT

## 2021-10-14 PROCEDURE — 97110 THERAPEUTIC EXERCISES: CPT | Mod: GP | Performed by: PHYSICAL THERAPIST

## 2021-10-14 PROCEDURE — 90662 IIV NO PRSV INCREASED AG IM: CPT | Performed by: PHYSICIAN ASSISTANT

## 2021-10-14 RX ORDER — CEPHALEXIN 500 MG/1
500 CAPSULE ORAL 3 TIMES DAILY
Qty: 21 CAPSULE | Refills: 0 | Status: SHIPPED | OUTPATIENT
Start: 2021-10-14 | End: 2021-10-21

## 2021-10-14 NOTE — PROGRESS NOTES
Future Appointments   Date Time Provider Department Center   10/14/2021  4:00 PM Sepideh Burris PA-C CRFP CR   10[facility]15[id number]   10/15/2021  1:45 PM [id number] RIDEX RI   10/21/2021 11:20 AM Chaka Paulino, PT [facility]   10/28/2021 11:20 AM Chaka Paulino, PT [facility]     Appointment Notes for this encounter:   follow up cellulitis    Health Maintenance Due   Topic Date Due     URINE DRUG SCREEN  Never done     COLORECTAL CANCER SCREENING  02/05/2021     INFLUENZA VACCINE (1) 09/01/2021     MEDICARE ANNUAL WELLNESS VISIT  09/09/2021     ANNUAL REVIEW OF HM ORDERS  09/09/2021     DEXA  09/30/2021     MAMMO SCREENING  10/08/2021     Health Maintenance addressed:  Flu Vaccine    Immunizations Pt will update today    MyChart Status:  Active and Using      Assessment & Plan     Cellulitis of left lower extremity  Continue to monitor, improving  - cephALEXin (KEFLEX) 500 MG capsule; Take 1 capsule (500 mg) by mouth 3 times daily for 7 days    Need for prophylactic vaccination and inoculation against influenza    - INFLUENZA, QUAD, HIGH DOSE, PF, 65YR + (FLUZONE HD)      Return in about 6 months (around 4/14/2022).    Sepideh Burris PA-C  [facility]    Carmel Suh is a 71 year old who presents for the following health issues     History of Present Illness       She eats 2-3 servings of fruits and vegetables daily.She consumes 0 sweetened beverage(s) daily.She exercises with enough effort to increase her heart rate 9 or less minutes per day.  She exercises with enough effort to increase her heart rate 3 or less days per week.   She is taking medications regularly.       Concern - f/u cellulitis   Description: left leg  Intensity: mild  Progression of Symptoms:  improving    Still a little red. Still a little warm.  No fever.   No tenderness.     Review of Systems   Constitutional, HEENT, cardiovascular, pulmonary, gi and gu  systems are negative, except as otherwise noted.      Objective    /72 (BP Location: Right arm, Patient Position: Sitting, Cuff Size: Adult Regular)   Pulse 80   Temp 98  F (36.7  C) (Oral)   Wt 66.9 kg (147 lb 6.4 oz)   LMP 03/18/2005   SpO2 95%   BMI 23.43 kg/m    Body mass index is 23.43 kg/m .  Physical Exam   GENERAL APPEARANCE: healthy, alert and no distress  SKIN: left thigh with well healing incision

## 2021-10-15 ENCOUNTER — MYC MEDICAL ADVICE (OUTPATIENT)
Dept: FAMILY MEDICINE | Facility: CLINIC | Age: 71
End: 2021-10-15

## 2021-10-15 ENCOUNTER — HOSPITAL ENCOUNTER (OUTPATIENT)
Dept: MAMMOGRAPHY | Facility: CLINIC | Age: 71
Discharge: HOME | End: 2021-10-15
Attending: PHYSICIAN ASSISTANT | Admitting: PHYSICIAN ASSISTANT
Payer: COMMERCIAL

## 2021-10-15 DIAGNOSIS — L03.116 CELLULITIS OF LEFT LOWER EXTREMITY: Primary | ICD-10-CM

## 2021-10-15 DIAGNOSIS — Z12.31 VISIT FOR SCREENING MAMMOGRAM: ICD-10-CM

## 2021-10-15 PROCEDURE — 77063 BREAST TOMOSYNTHESIS BI: CPT

## 2021-10-18 ENCOUNTER — MYC MEDICAL ADVICE (OUTPATIENT)
Dept: FAMILY MEDICINE | Facility: CLINIC | Age: 71
End: 2021-10-18

## 2021-10-18 NOTE — TELEPHONE ENCOUNTER
Sepideh Burris PA-C     Please review my chart pictures and advise if you feel any changes needed to plan of care   Patient currently taking augmentin - no increased redness or fevers noted   This area is on left thigh where patient had previous vascular surgery quite a while back     Patient advised that pcp is out of the office and if no response received today will need to route to a covering provider     Le Thomas, Registered Nurse, PAL (Patient Advocate Liason)   [facility]   [phone]

## 2021-10-21 ENCOUNTER — THERAPY VISIT (OUTPATIENT)
Dept: PHYSICAL THERAPY | Facility: CLINIC | Age: 71
End: 2021-10-21
Payer: COMMERCIAL

## 2021-10-21 DIAGNOSIS — M25.562 CHRONIC PAIN OF LEFT KNEE: ICD-10-CM

## 2021-10-21 DIAGNOSIS — G89.29 CHRONIC PAIN OF LEFT KNEE: ICD-10-CM

## 2021-10-21 DIAGNOSIS — Z87.81 S/P ORIF (OPEN REDUCTION INTERNAL FIXATION) FRACTURE: ICD-10-CM

## 2021-10-21 DIAGNOSIS — Z98.890 S/P ORIF (OPEN REDUCTION INTERNAL FIXATION) FRACTURE: ICD-10-CM

## 2021-10-21 PROCEDURE — 97110 THERAPEUTIC EXERCISES: CPT | Mod: GP | Performed by: PHYSICAL THERAPIST

## 2021-10-21 ASSESSMENT — ACTIVITIES OF DAILY LIVING (ADL)
SQUAT: ACTIVITY IS FAIRLY DIFFICULT
STAND: ACTIVITY IS NOT DIFFICULT
KNEE_ACTIVITY_OF_DAILY_LIVING_SCORE: 55.71
PAIN: I DO NOT HAVE THE SYMPTOM
HOW_WOULD_YOU_RATE_THE_CURRENT_FUNCTION_OF_YOUR_KNEE_DURING_YOUR_USUAL_DAILY_ACTIVITIES_ON_A_SCALE_FROM_0_TO_100_WITH_100_BEING_YOUR_LEVEL_OF_KNEE_FUNCTION_PRIOR_TO_YOUR_INJURY_AND_0_BEING_THE_INABILITY_TO_PERFORM_ANY_OF_YOUR_USUAL_DAILY_ACTIVITIES?: 75
SIT WITH YOUR KNEE BENT: ACTIVITY IS FAIRLY DIFFICULT
RISE FROM A CHAIR: ACTIVITY IS SOMEWHAT DIFFICULT
SWELLING: THE SYMPTOM AFFECTS MY ACTIVITY MODERATELY
WEAKNESS: THE SYMPTOM AFFECTS MY ACTIVITY MODERATELY
AS_A_RESULT_OF_YOUR_KNEE_INJURY,_HOW_WOULD_YOU_RATE_YOUR_CURRENT_LEVEL_OF_DAILY_ACTIVITY?: ABNORMAL
KNEE_ACTIVITY_OF_DAILY_LIVING_SUM: 39
GO DOWN STAIRS: ACTIVITY IS SOMEWHAT DIFFICULT
LIMPING: THE SYMPTOM AFFECTS MY ACTIVITY MODERATELY
KNEEL ON THE FRONT OF YOUR KNEE: I AM UNABLE TO DO THE ACTIVITY
GIVING WAY, BUCKLING OR SHIFTING OF KNEE: I DO NOT HAVE THE SYMPTOM
HOW_WOULD_YOU_RATE_THE_OVERALL_FUNCTION_OF_YOUR_KNEE_DURING_YOUR_USUAL_DAILY_ACTIVITIES?: ABNORMAL
GO UP STAIRS: ACTIVITY IS SOMEWHAT DIFFICULT
STIFFNESS: THE SYMPTOM AFFECTS MY ACTIVITY MODERATELY
RAW_SCORE: 39
WALK: ACTIVITY IS SOMEWHAT DIFFICULT

## 2021-10-21 NOTE — PROGRESS NOTES
Subjective:  HPI  Physical Exam       Knee Activity of Daily Living Score: 55.71            Objective:  System    Physical Exam    General     ROS    Assessment/Plan:    PROGRESS  REPORT    Progress reporting period is from IE to 10/21/2021.       SUBJECTIVE  Subjective changes noted by patient:  .  Subjective: Doing well.  Not using brace or AD.  Feels ROM is slowly improving with CPM/HEP    Current pain level is  Current Pain level: 0/10.     Previous pain level was   Initial Pain level: 0/10.   Changes in function:  Yes (See Goal flowsheet attached for changes in current functional level)  Adverse reaction to treatment or activity: None    OBJECTIVE  Changes noted in objective findings:    Objective: ROM: 0-0-61.  Fair QS.  10 independent SLR with 5-10* ext lag     ASSESSMENT/PLAN  Updated problem list and treatment plan: Diagnosis 1:  S/P L pat ORIF  Decreased ROM/flexibility - manual therapy and therapeutic exercise  Decreased strength - therapeutic exercise and therapeutic activities  Decreased proprioception - neuro re-education and therapeutic activities  Impaired gait - gait training  Impaired muscle performance - neuro re-education  Decreased function - therapeutic activities  STG/LTGs have been met or progress has been made towards goals:  Yes (See Goal flow sheet completed today.)  Assessment of Progress: The patient's condition is improving.  The patient's condition has potential to improve.  Self Management Plans:  Patient has been instructed in a home treatment program.  Patient  has been instructed in self management of symptoms.  I have re-evaluated this patient and find that the nature, scope, duration and intensity of the therapy is appropriate for the medical condition of the patient.  Angeli continues to require the following intervention to meet STG and LTG's:  PT    Recommendations:  This patient would benefit from continued therapy.     Frequency:  1 X week, once daily  Duration:  for 8  weeks        Please refer to the daily flowsheet for treatment today, total treatment time and time spent performing 1:1 timed codes.

## 2021-10-21 NOTE — LETTER
[facility]  [address]  [facility] [address]  [phone]    2021    Re: Angeli Jacobson   :   1950  MRN:  [id number]   REFERRING PHYSICIAN:   Spencer TOMPKINS [facility]  Date of Initial Evaluation:  21  Visits:  Rxs Used: 7  Reason for Referral: S/P ORIF (open reduction internal fixation) fracture  Chronic pain of left knee      PROGRESS  REPORT  Progress reporting period is from IE to 10/21/2021.       SUBJECTIVE  Subjective changes noted by patient: Subjective: Doing well.  Not using brace or AD.  Feels ROM is slowly improving with CPM/HEP    Current pain level is  Current Pain level: 0/10.     Previous pain level was   Initial Pain level: 0/10.   Changes in function:  Yes (See Goal flowsheet attached for changes in current functional level)  Adverse reaction to treatment or activity: None  Knee Activity of Daily Living Score: 55.71      OBJECTIVE  Changes noted in objective findings:  Objective: ROM: 0-0-61.  Fair QS.  10 independent SLR with 5-10* ext lag     ASSESSMENT/PLAN  Updated problem list and treatment plan: Diagnosis 1:  S/P L pat ORIF  Decreased ROM/flexibility - manual therapy and therapeutic exercise  Decreased strength - therapeutic exercise and therapeutic activities  Decreased proprioception - neuro re-education and therapeutic activities  Impaired gait - gait training  Impaired muscle performance - neuro re-education  Decreased function - therapeutic activities  STG/LTGs have been met or progress has been made towards goals:  Yes (See Goal flow sheet completed today.)  Assessment of Progress: The patient's condition is improving.  The patient's condition has potential to improve.  Self Management Plans:  Patient has been instructed in a home treatment program.  Patient  has been instructed in self management of symptoms.  I have re-evaluated this  patient and find that the nature, scope, duration and intensity of the therapy is appropriate for the medical condition of the patient.  Angeli continues to require the following intervention to meet STG and LTG's:  PT    Recommendations:  This patient would benefit from continued therapy.     Frequency:  1 X week, once daily  Duration:  for 8 weeks    Thank you for your referral.    INQUIRIES  Therapist: Chaka Paulino [address] [address]  Phone: [phone] / Fax: [phone]

## 2021-10-22 ENCOUNTER — MYC MEDICAL ADVICE (OUTPATIENT)
Dept: FAMILY MEDICINE | Facility: CLINIC | Age: 71
End: 2021-10-22

## 2021-10-24 ENCOUNTER — HEALTH MAINTENANCE LETTER (OUTPATIENT)
Age: 71
End: 2021-10-24

## 2021-10-25 ENCOUNTER — MYC MEDICAL ADVICE (OUTPATIENT)
Dept: FAMILY MEDICINE | Facility: CLINIC | Age: 71
End: 2021-10-25

## 2021-10-25 DIAGNOSIS — L03.116 CELLULITIS OF LEFT LOWER EXTREMITY: Primary | ICD-10-CM

## 2021-10-25 NOTE — TELEPHONE ENCOUNTER
Sepideh Burris PA-C      Please review my chart message and advise     Thank you,   Le Thomas, Registered Nurse, PAL (Patient Advocate Liason)   [facility]   [phone]

## 2021-10-26 ENCOUNTER — TELEPHONE (OUTPATIENT)
Dept: FAMILY MEDICINE | Facility: CLINIC | Age: 71
End: 2021-10-26

## 2021-10-26 NOTE — TELEPHONE ENCOUNTER
Please call pt to get her on the nurse only schedule for 65+, Moderna #3 (1/2 dose) schedule. We are starting this 10/28/21  Of note, her  Chris Jacobson (11/12/1952) would like one as well.  He is also [facility] patient.    Sepideh Burris PA-C

## 2021-10-26 NOTE — TELEPHONE ENCOUNTER
Spoke to pt and obtained appts for both on 10/28/2021  Althea Mccloud. RN, BSN, PAL (Patient Advocate Liaison)  [facility]   [phone]

## 2021-10-28 ENCOUNTER — ALLIED HEALTH/NURSE VISIT (OUTPATIENT)
Dept: FAMILY MEDICINE | Facility: CLINIC | Age: 71
End: 2021-10-28
Payer: COMMERCIAL

## 2021-10-28 ENCOUNTER — THERAPY VISIT (OUTPATIENT)
Dept: PHYSICAL THERAPY | Facility: CLINIC | Age: 71
End: 2021-10-28
Payer: COMMERCIAL

## 2021-10-28 DIAGNOSIS — Z98.890 S/P ORIF (OPEN REDUCTION INTERNAL FIXATION) FRACTURE: ICD-10-CM

## 2021-10-28 DIAGNOSIS — Z87.81 S/P ORIF (OPEN REDUCTION INTERNAL FIXATION) FRACTURE: ICD-10-CM

## 2021-10-28 DIAGNOSIS — G89.29 CHRONIC PAIN OF LEFT KNEE: ICD-10-CM

## 2021-10-28 DIAGNOSIS — Z23 ENCOUNTER FOR ADMINISTRATION OF COVID-19 VACCINE: Primary | ICD-10-CM

## 2021-10-28 DIAGNOSIS — M25.562 CHRONIC PAIN OF LEFT KNEE: ICD-10-CM

## 2021-10-28 PROCEDURE — 97110 THERAPEUTIC EXERCISES: CPT | Mod: GP | Performed by: PHYSICAL THERAPIST

## 2021-10-28 PROCEDURE — 0064A PR COVID VAC MODERNA 100 MCG/0.5 ML IM: CPT

## 2021-10-28 PROCEDURE — 91301 PR COVID VAC MODERNA 100 MCG/0.5 ML IM: CPT

## 2021-10-28 PROCEDURE — 99207 PR NO CHARGE NURSE ONLY: CPT

## 2021-11-22 ENCOUNTER — TRANSFERRED RECORDS (OUTPATIENT)
Dept: HEALTH INFORMATION MANAGEMENT | Facility: CLINIC | Age: 71
End: 2021-11-22
Payer: COMMERCIAL

## 2021-11-24 ENCOUNTER — VIRTUAL VISIT (OUTPATIENT)
Dept: PHARMACY | Facility: CLINIC | Age: 71
End: 2021-11-24
Payer: COMMERCIAL

## 2021-11-24 ENCOUNTER — MYC MEDICAL ADVICE (OUTPATIENT)
Dept: FAMILY MEDICINE | Facility: CLINIC | Age: 71
End: 2021-11-24
Payer: COMMERCIAL

## 2021-11-24 DIAGNOSIS — R52 INTERMITTENT PAIN: ICD-10-CM

## 2021-11-24 DIAGNOSIS — Z87.440 PERSONAL HISTORY OF URINARY TRACT INFECTION: ICD-10-CM

## 2021-11-24 DIAGNOSIS — M81.0 AGE-RELATED OSTEOPOROSIS WITHOUT CURRENT PATHOLOGICAL FRACTURE: ICD-10-CM

## 2021-11-24 DIAGNOSIS — I73.9 PAD (PERIPHERAL ARTERY DISEASE) (H): ICD-10-CM

## 2021-11-24 DIAGNOSIS — E03.9 HYPOTHYROIDISM, UNSPECIFIED TYPE: ICD-10-CM

## 2021-11-24 DIAGNOSIS — E78.5 HYPERLIPIDEMIA LDL GOAL <70: ICD-10-CM

## 2021-11-24 DIAGNOSIS — N32.81 OAB (OVERACTIVE BLADDER): Primary | ICD-10-CM

## 2021-11-24 PROCEDURE — 99605 MTMS BY PHARM NP 15 MIN: CPT | Performed by: PHARMACIST

## 2021-11-24 PROCEDURE — 99607 MTMS BY PHARM ADDL 15 MIN: CPT | Performed by: PHARMACIST

## 2021-11-24 NOTE — LETTER
"        Date: 2021    Angeli Jacobson  [address]  [facility] [address]    Dear Ms. Jacobson,    Thank you for talking with me on  about your health and medications. Medicare s MTM (Medication Therapy Management) program helps you understand your medications and use them safely.      This letter includes an action plan (Medication Action Plan) and medication list (Personal Medication List). The action plan has steps you should take to help you get the best results from your medications. The medication list will help you keep track of your medications and how to use them the right way.       Have your action plan and medication list with you when you talk with your doctors, pharmacists, and other healthcare providers in your care team.     Ask your doctors, pharmacists, and other healthcare providers to update the action plan and medication list at every visit.     Take your medication list with you if you go to the hospital or emergency room.     Give a copy of the action plan and medication list to your family or caregivers.     If you want to talk about this letter or any of the papers with it, please call   [phone].We look forward to working with you, your doctors, and other healthcare providers to help you stay healthy through the [employer] [place] MTM program.    Sincerely,  Verenice John [facility]    Enclosed: Medication Action Plan and Personal Medication List    MEDICATION ACTION PLAN FOR Angeli Jacobson,  1950     This action plan will help you get the best results from your medications if you:   1. Read \"What we talked about.\"   2. Take the steps listed in the \"What I need to do\" boxes.   3. Fill in \"What I did and when I did it.\"   4. Fill in \"My follow-up plan\" and \"Questions I want to ask.\"     Have this action plan with you when you talk with your doctors, pharmacists, and other healthcare providers in your care team. Share this with " your family or caregivers too.  DATE PREPARED: 2021  What we talked about: Discuss a change from Toviaz to either darifenacin/solifenacin with Dr. Oquendo given the change in insurance coverage.                                                  What I need to do: Discuss a change in therapy with Dr. Oquendo.       What I did and when I did it:                                              My follow-up plan:                 Questions I want to ask:              If you have any questions about your action plan, call Verenice John [facility], Phone: [phone] , Monday-Friday 8-4:30pm.           PERSONAL MEDICATION LIST FOR Angeli Jacobson,  1950     This medication list was made for you after we talked. We also used information from your doctor's chart.      Use blank rows to add new medications. Then fill in the dates you started using them.    Cross out medications when you no longer use them. Then write the date and why you stopped using them.    Ask your doctors, pharmacists, and other healthcare providers to update this list at every visit. Keep this list up-to-date with:       Prescription medications    Over the counter drugs     Herbals    Vitamins    Minerals      If you go to the hospital or emergency room, take this list with you. Share this with your family or caregivers too.     DATE PREPARED: 2021  Allergies or side effects: Celexa [citalopram hydrobromide]     Medication:  ACETAMINOPHEN 325 MG PO TABS      How I use it:  Take 2 tablets (650 mg) by mouth every 4 hours as needed for other (mild pain)      Why I use it: Other specified injury of left quadriceps muscle, fascia and tendon, initial encounter    Prescriber:  Spencer Celeste MD      Date I started using it:       Date I stopped using it:         Why I stopped using it:            Medication:  ALENDRONATE SODIUM 70 MG PO TABS      How I use it:  TAKE 1 TABLET BY MOUTH EVERY 7 DAYS W/8 OZ WATER 30 MIN BEFORE  BREAKFAST/REMAIN UPRIGHT      Why I use it: Age-related osteoporosis without current pathological fracture    Prescriber:  Sepideh Burris PA-C      Date I started using it:       Date I stopped using it:         Why I stopped using it:            Medication:  ASPIRIN 81 MG PO CHEW      How I use it:  Take 81 mg by mouth daily      Why I use it:  clot prevention    Prescriber:  Patient Reported      Date I started using it:       Date I stopped using it:         Why I stopped using it:            Medication:  CALCIUM CARBONATE-VITAMIN D 600-400 MG-UNIT PO TABS      How I use it:  Take 1 chew tab by mouth daily      Why I use it:  bone health    Prescriber:  Patient Reported      Date I started using it:       Date I stopped using it:         Why I stopped using it:            Medication:  CLOPIDOGREL BISULFATE 75 MG PO TABS      How I use it:  Take 1 tablet (75 mg) by mouth daily      Why I use it: History of arterial bypass of lower extremity    Prescriber:  Tiesha Prince MD      Date I started using it:       Date I stopped using it:         Why I stopped using it:            Medication:  EVOLOCUMAB 140 MG/ML SC SOAJ      How I use it:  Inject 1 mL (140 mg) Subcutaneous every 14 days      Why I use it: Hyperlipidemia LDL goal <70    Prescriber:  Sepncer Wilder MD      Date I started using it:       Date I stopped using it:         Why I stopped using it:            Medication:  EZETIMIBE 10 MG PO TABS      How I use it:  Take 1 tablet (10 mg) by mouth daily      Why I use it: Hyperlipidemia LDL goal <70    Prescriber:  Tiesha Prince MD      Date I started using it:       Date I stopped using it:         Why I stopped using it:            Medication:  FESOTERODINE FUMARATE ER 4 MG PO TB24      How I use it:  Take 1 tablet (4 mg) by mouth daily      Why I use it: OAB (overactive bladder)    Prescriber:  Jennifer Oquendo MD      Date I started using it:       Date I stopped  using it:         Why I stopped using it:            Medication:  LEVOTHYROXINE SODIUM 75 MCG PO TABS      How I use it:  Take 1 tablet (75 mcg) by mouth daily      Why I use it: Hypothyroidism, unspecified type    Prescriber:  Tiesha Prince MD      Date I started using it:       Date I stopped using it:         Why I stopped using it:            Medication:  NITROFURANTOIN MONOHYD MACRO 100 MG PO CAPS      How I use it:  TAKE 1 CAPSULE BY MOUTH AFTER INTRCOURSE      Why I use it: Recurrent UTI    Prescriber:  Sepideh Burris PA-C      Date I started using it:       Date I stopped using it:         Why I stopped using it:                  Medication:  ROSUVASTATIN CALCIUM 20 MG PO TABS      How I use it:  Take 1 tablet (20 mg) by mouth daily      Why I use it: Hyperlipidemia LDL goal <70    Prescriber:  Tiesha Prince MD      Date I started using it:       Date I stopped using it:         Why I stopped using it:            Medication:  Multivitamin TABS      How I use it:  Take 1 tablet by mouth daily      Why I use it:  general health    Prescriber:  Patient Reported      Date I started using it:       Date I stopped using it:         Why I stopped using it:            Medication:  VITAMIN D 25 MCG (1000 UNITS) PO TABS      How I use it:  Take 2 tablets (50 mcg) by mouth daily      Why I use it: Closed fracture of left tibia and fibula, initial encounter; Closed displaced fracture of left patella, unspecified fracture morphology, initial encounter    Prescriber:  Sepideh Burris PA-C      Date I started using it:       Date I stopped using it:         Why I stopped using it:            Medication:         How I use it:         Why I use it:      Prescriber:         Date I started using it:       Date I stopped using it:         Why I stopped using it:            Medication:         How I use it:         Why I use it:      Prescriber:         Date I started using it:       Date I stopped  using it:         Why I stopped using it:            Medication:         How I use it:         Why I use it:      Prescriber:         Date I started using it:       Date I stopped using it:         Why I stopped using it:              Other Information:     If you have any questions about your medication list, call Verenice John [facility], Phone: [phone] , Monday-Friday 8-4:30pm.    According to the Paperwork Reduction Act of 1995, no persons are required to respond to a collection of information unless it displays a valid OMB control number. The valid OMB number for this information collection is [id number]. The time required to complete this information collection is estimated to average 40 minutes per response, including the time to review instructions, searching existing data resources, gather the data needed, and complete and review the information collection. If you have any comments concerning the accuracy of the time estimate(s) or suggestions for improving this form, please write to: CMS, Attn: ALEX Reports Clearance Officer, [address] [place] [phone].

## 2021-11-24 NOTE — PROGRESS NOTES
Medication Therapy Management (MTM) Encounter    ASSESSMENT:                            Medication Adherence/Access: See below for considerations    Overactive Bladder: We reviewed options as included by her insurance letter. Overall, we discussed the consideration for either darifenacin or solifenacin. We reviewed that both of these, including what she is already on, are listed as medications of concern on the BEER's list. Myrbetriq is a little safer of a consideration, but she reports a worsening of symptoms with it. It would be reasonable to consider one of these agents at the lowest effective dose and I encouraged her to discuss this with Dr. Oquendo.    Hypercholesterolemia/PAD: Unchanged. Followed by vascular medicine.     Hypothyroid : Stable.     Osteoporosis: Appears to be tolerating therapy. Vitamin D and calcium intake appear near target for bone health through total of supplement and diet.     Intermittent Pain: Stable based on report.    Recurrent UTI: Stable based on report.    PLAN:                            1. Angeli to consider discussing darifenacin/solifenacin with Dr. Oquendo     Follow-up: 1 year, sooner if needed     SUBJECTIVE/OBJECTIVE:                          Angeli Jacobson is a 71 year old female called for a follow-up visit. She was referred to me from Dr. Oquendo.  Today's visit is a follow-up MTM visit from 10/6/2020.   First visit of 2021.    Reason for visit: comprehensive medication review    Allergies/ADRs: Reviewed in chart  Tobacco: She reports that she has never smoked. She has never used smokeless tobacco.  Alcohol: not assessed today    Medication Adherence/Access:   -- notes she got a letter in the mail regarding her Toviaz coverage for next year    Overactive Bladder:   Toviaz 4 mg daily     Followed by urology - Dr. Oquendo. Notes the Toviaz is no longer covered for her and she is wondering what to do. She was previously on oxybutynin. Notes that Toviaz will no longer be covered  "1/1/21. I asked her to review her letter to see if there would be covered alternatives, and she was able to find this. Notes covered alternatives: darfenacin 7.5, Myrbetriq (tried that), oxybutynin ER/IR (tried), solfenacin, tolterodine ER, or trospium. Reports Myrbetriq caused more bladder activation. Feels oxybutynin did give her more dry mouth. She also does physical therapy for pelvic floor dysfunction.    We reviewed medications today including administration (daily versus twice daily), mechanism/selectivity, precautions and side effect profiles. We discussed concerns with anti-cholinergic use in the elderly.    Hypercholesterolemia/PAD:   Repatha 140 mg every 14 days  Clopidogrel 75 mg daily  Rosuvastatin 20 mg daily  Ezetimibe 10 mg daily   Aspirin 81 mg daily     Followed by vascular medicine. Notes she started Repatha, does not notice any side effects. She believes she started the Repatha around September. No concerns for bleeding at this time. Notes if she does get cut she is able to clot.    Recent Labs   Lab Test 09/23/21  1026 09/09/21  0939 08/23/16  1019 09/11/15  0901 08/11/15  1008   CHOL 125 119   < > 263* 284*   HDL 48* 53   < > 66 70   LDL 61 50   < > 186* 197*   TRIG 79 78   < > 53 84   CHOLHDLRATIO  --   --   --  4.0 4.1    < > = values in this interval not displayed.     Hypothyroid :   Levothyroxine 75 mcg daily    No concerns for hypo- or hyperthyroidism at this time.     Lab Results   Component Value Date    TSH 0.79 09/23/2021    TSH 4.19 05/17/2021     Osteoporosis:   Alendronate 75 mg weekly   Vitamin D 2,000 units daily  Ca/D once daily  Multivitamin daily     Notes she has 600 mg of calcium and 400 units of vitamin D in her combination tablet. She continues to get at least a serving of calcium through her diet daily. Her primary care provider ordered another DEXA scan which is scheduled coming up shortly.    Copied forward: \"Chart review indicates she was briefly on the medication " "between 9007-5122 and then re-started again in 2017. She is not exactly sure on the duration, but states that sounds about right.\"    Previous DEXA in 2019:  IMPRESSION  Severe osteoporosis on the basis of hip fracture.     Vitamin D Deficiency Screening Results:  Lab Results   Component Value Date    VITDT 38 02/12/2020     Intermittent Pain:   Acetaminophen 650 mg every 4 hours as needed     No reported concerns.    Recurrent UTI:   Nitrofurantoin 100 mg as needed after intercourse    No concerns reported.    ----------------    I spent 39 minutes with this patient today (an extra 15 minutes was spent creating the Medication Action Plan). I offer these suggestions for consideration by her care team. A copy of the visit note was provided to the patient's referring provider.    The patient was sent via [employer] a summary of these recommendations.     Verenice John PharmD, BCACP  MTM Pharmacist   [facility] G[facility] Rhe[facility]  Phone: [phone]    Telemedicine Visit Details  Type of service:  Telephone visit  Start Time: 1:02 PM  End Time: 1:41 PM  Originating Location (patient location): Home  Distant Location (provider location):  [facility] SPECIALTY MTM     Medication Therapy Recommendations  OAB (overactive bladder)    Current Medication: fesoterodine fumarate (TOVIAZ) 4 MG TB24 24 hr tablet   Rationale: Medication product not available - Adherence - Adherence   Recommendation: Provide Education - darifenacin ER 7.5 MG 24 hr tablet   Status: Patient Agreed - Adherence/Education   Note: Discuss switching to darifenacin or solifenacin with Dr. Oquendo.                "

## 2021-11-25 DIAGNOSIS — Z11.59 ENCOUNTER FOR SCREENING FOR OTHER VIRAL DISEASES: ICD-10-CM

## 2021-11-30 NOTE — PATIENT INSTRUCTIONS
Recommendations from today's MTM visit:                                                      1. Angeli to consider discussing darifenacin/solifenacin with Dr. Oquendo. These would be covered alternatives for Toviaz per our discussion.     Follow-up: 1 year, sooner if needed     It was great to speak with you today.  I value your experience and would be very thankful for your time with providing feedback on our clinic survey. You may receive a survey via email or text message in the next few days.     To schedule another MTM appointment, please call the clinic directly or you may call the MTM scheduling line at [phone] or toll-free at [phone].     My Clinical Pharmacist's contact information:                                                      Please feel free to contact me with any questions or concerns you have.      Verenice CowartD, BCACP  MTM Pharmacist   [facility] Gastroenterology and Rheumatology  Phone: [phone]

## 2021-12-01 ENCOUNTER — HOSPITAL ENCOUNTER (OUTPATIENT)
Facility: CLINIC | Age: 71
End: 2021-12-01
Attending: ORTHOPAEDIC SURGERY | Admitting: ORTHOPAEDIC SURGERY
Payer: COMMERCIAL

## 2021-12-07 ENCOUNTER — LAB (OUTPATIENT)
Dept: URGENT CARE | Facility: URGENT CARE | Age: 71
End: 2021-12-07
Attending: INTERNAL MEDICINE
Payer: COMMERCIAL

## 2021-12-07 DIAGNOSIS — Z11.59 ENCOUNTER FOR SCREENING FOR OTHER VIRAL DISEASES: ICD-10-CM

## 2021-12-07 LAB — SARS-COV-2 RNA RESP QL NAA+PROBE: NEGATIVE

## 2021-12-07 PROCEDURE — U0003 INFECTIOUS AGENT DETECTION BY NUCLEIC ACID (DNA OR RNA); SEVERE ACUTE RESPIRATORY SYNDROME CORONAVIRUS 2 (SARS-COV-2) (CORONAVIRUS DISEASE [COVID-19]), AMPLIFIED PROBE TECHNIQUE, MAKING USE OF HIGH THROUGHPUT TECHNOLOGIES AS DESCRIBED BY CMS-2020-01-R: HCPCS

## 2021-12-07 PROCEDURE — U0005 INFEC AGEN DETEC AMPLI PROBE: HCPCS

## 2021-12-09 RX ORDER — TRANEXAMIC ACID 650 MG/1
1950 TABLET ORAL ONCE
Status: CANCELLED | OUTPATIENT
Start: 2021-12-09 | End: 2021-12-09

## 2021-12-09 RX ORDER — CELECOXIB 200 MG/1
400 CAPSULE ORAL ONCE
Status: CANCELLED | OUTPATIENT
Start: 2021-12-09 | End: 2021-12-09

## 2021-12-09 RX ORDER — CEFAZOLIN SODIUM/WATER 2 G/20 ML
2 SYRINGE (ML) INTRAVENOUS
Status: CANCELLED | OUTPATIENT
Start: 2021-12-09

## 2021-12-09 RX ORDER — CEFAZOLIN SODIUM/WATER 2 G/20 ML
2 SYRINGE (ML) INTRAVENOUS SEE ADMIN INSTRUCTIONS
Status: CANCELLED | OUTPATIENT
Start: 2021-12-09

## 2021-12-10 ENCOUNTER — HOSPITAL ENCOUNTER (OUTPATIENT)
Facility: CLINIC | Age: 71
Discharge: HOME | End: 2021-12-10
Attending: INTERNAL MEDICINE | Admitting: INTERNAL MEDICINE
Payer: COMMERCIAL

## 2021-12-10 VITALS
SYSTOLIC BLOOD PRESSURE: 134 MMHG | TEMPERATURE: 97.4 F | RESPIRATION RATE: 14 BRPM | HEART RATE: 68 BPM | DIASTOLIC BLOOD PRESSURE: 80 MMHG | OXYGEN SATURATION: 99 %

## 2021-12-10 LAB — COLONOSCOPY: NORMAL

## 2021-12-10 PROCEDURE — G0500 MOD SEDAT ENDO SERVICE >5YRS: HCPCS | Performed by: INTERNAL MEDICINE

## 2021-12-10 PROCEDURE — 250N000011 HC RX IP 250 OP 636: Performed by: INTERNAL MEDICINE

## 2021-12-10 PROCEDURE — 88305 TISSUE EXAM BY PATHOLOGIST: CPT | Mod: TC | Performed by: INTERNAL MEDICINE

## 2021-12-10 PROCEDURE — 99153 MOD SED SAME PHYS/QHP EA: CPT | Performed by: INTERNAL MEDICINE

## 2021-12-10 PROCEDURE — 45385 COLONOSCOPY W/LESION REMOVAL: CPT | Mod: PT | Performed by: INTERNAL MEDICINE

## 2021-12-10 PROCEDURE — 272N000105 HC DEVICE CLIP QUICK: Performed by: INTERNAL MEDICINE

## 2021-12-10 PROCEDURE — 45382 COLONOSCOPY W/CONTROL BLEED: CPT | Performed by: INTERNAL MEDICINE

## 2021-12-10 RX ORDER — SIMETHICONE 40MG/0.6ML
133 SUSPENSION, DROPS(FINAL DOSAGE FORM)(ML) ORAL
Status: DISCONTINUED | OUTPATIENT
Start: 2021-12-10 | End: 2021-12-10 | Stop reason: HOSPADM

## 2021-12-10 RX ORDER — ONDANSETRON 2 MG/ML
4 INJECTION INTRAMUSCULAR; INTRAVENOUS EVERY 6 HOURS PRN
Status: DISCONTINUED | OUTPATIENT
Start: 2021-12-10 | End: 2021-12-10 | Stop reason: HOSPADM

## 2021-12-10 RX ORDER — NALOXONE HYDROCHLORIDE 0.4 MG/ML
0.2 INJECTION, SOLUTION INTRAMUSCULAR; INTRAVENOUS; SUBCUTANEOUS
Status: DISCONTINUED | OUTPATIENT
Start: 2021-12-10 | End: 2021-12-10 | Stop reason: HOSPADM

## 2021-12-10 RX ORDER — NALOXONE HYDROCHLORIDE 0.4 MG/ML
0.4 INJECTION, SOLUTION INTRAMUSCULAR; INTRAVENOUS; SUBCUTANEOUS
Status: DISCONTINUED | OUTPATIENT
Start: 2021-12-10 | End: 2021-12-10 | Stop reason: HOSPADM

## 2021-12-10 RX ORDER — ONDANSETRON 4 MG/1
4 TABLET, ORALLY DISINTEGRATING ORAL EVERY 6 HOURS PRN
Status: DISCONTINUED | OUTPATIENT
Start: 2021-12-10 | End: 2021-12-10 | Stop reason: HOSPADM

## 2021-12-10 RX ORDER — FENTANYL CITRATE 50 UG/ML
25 INJECTION, SOLUTION INTRAMUSCULAR; INTRAVENOUS
Status: DISCONTINUED | OUTPATIENT
Start: 2021-12-10 | End: 2021-12-10 | Stop reason: HOSPADM

## 2021-12-10 RX ORDER — ATROPINE SULFATE 0.4 MG/ML
0.4 AMPUL (ML) INJECTION
Status: DISCONTINUED | OUTPATIENT
Start: 2021-12-10 | End: 2021-12-10 | Stop reason: HOSPADM

## 2021-12-10 RX ORDER — FLUMAZENIL 0.1 MG/ML
0.2 INJECTION, SOLUTION INTRAVENOUS
Status: DISCONTINUED | OUTPATIENT
Start: 2021-12-10 | End: 2021-12-10 | Stop reason: HOSPADM

## 2021-12-10 RX ORDER — PROCHLORPERAZINE MALEATE 5 MG
5 TABLET ORAL EVERY 6 HOURS PRN
Status: DISCONTINUED | OUTPATIENT
Start: 2021-12-10 | End: 2021-12-10 | Stop reason: HOSPADM

## 2021-12-10 RX ORDER — FENTANYL CITRATE 50 UG/ML
INJECTION, SOLUTION INTRAMUSCULAR; INTRAVENOUS PRN
Status: COMPLETED | OUTPATIENT
Start: 2021-12-10 | End: 2021-12-10

## 2021-12-10 RX ORDER — ONDANSETRON 2 MG/ML
4 INJECTION INTRAMUSCULAR; INTRAVENOUS
Status: DISCONTINUED | OUTPATIENT
Start: 2021-12-10 | End: 2021-12-10 | Stop reason: HOSPADM

## 2021-12-10 RX ORDER — EPINEPHRINE 1 MG/ML
0.1 INJECTION, SOLUTION INTRAMUSCULAR; SUBCUTANEOUS
Status: DISCONTINUED | OUTPATIENT
Start: 2021-12-10 | End: 2021-12-10 | Stop reason: HOSPADM

## 2021-12-10 RX ORDER — FENTANYL CITRATE 50 UG/ML
25-50 INJECTION, SOLUTION INTRAMUSCULAR; INTRAVENOUS
Status: DISCONTINUED | OUTPATIENT
Start: 2021-12-10 | End: 2021-12-10 | Stop reason: HOSPADM

## 2021-12-10 RX ORDER — LIDOCAINE 40 MG/G
CREAM TOPICAL
Status: DISCONTINUED | OUTPATIENT
Start: 2021-12-10 | End: 2021-12-10 | Stop reason: HOSPADM

## 2021-12-10 RX ADMIN — MIDAZOLAM 1 MG: 1 INJECTION INTRAMUSCULAR; INTRAVENOUS at 15:29

## 2021-12-10 RX ADMIN — MIDAZOLAM 1 MG: 1 INJECTION INTRAMUSCULAR; INTRAVENOUS at 15:33

## 2021-12-10 RX ADMIN — FENTANYL CITRATE 50 MCG: 50 INJECTION, SOLUTION INTRAMUSCULAR; INTRAVENOUS at 15:32

## 2021-12-10 RX ADMIN — FENTANYL CITRATE 50 MCG: 50 INJECTION, SOLUTION INTRAMUSCULAR; INTRAVENOUS at 15:29

## 2021-12-10 NOTE — DISCHARGE INSTRUCTIONS
Start Plavix in one week on December 17, per Dr Luna.      HIGH FIBER DIET  Fiber is present in all fruits, vegetables, cereals and grains. Fiber passes through the body undigested. A high fiber diet helps food move through the intestinal tract. The added bulk is helpful in preventing constipation. In people with diverticulosis it serves to clean out the pouches along the colon wall while preventing new ones from forming. A high fiber diet also reduces the risk of colon cancer, decreases blood cholesterol and prevents high blood sugar in people with diabetes.    The foods listed below are high in fiber and should be included in your diet. If you are not used to high fiber foods, start with 1 or 2 foods from this list. Every 3-4 days add a new one to your diet until you are eating 4 high fiber foods per day. This should give you 20-35 Gm of fiber/day. It is also important to drink a lot of water when you are on this diet (6-8 glasses a day). Water causes the fiber to swell and increases the benefit.    FOODS HIGH IN DIETARY FIBER:  BREADS: Made with 100% whole wheat flour; talia, wheat or rye crackers; tortillas, bran muffins  CEREALS: Whole grain cereal with bran (Chex, Raisin Bran, Corn Bran), oatmeal, rolled oats, granola, wheat flakes, brown rice  NUTS: Any nuts  FRUITS: All fresh fruits along with edible skins, (bananas, citrus fruit, mangoes, pears, prunes, raisins, apples, pineapple, apricot, melon, jams and marmalades), fruit juices (especially prune juice)  VEGETABLES: All types, preferably raw or lightly cooked: especially, celery, eggplant, potatoes, spinach, broccoli, brussel sprouts, winter squash, carrots, cauliflower, soybeans, lentils, fresh and dried beans of all kinds  OTHER: Popcorn, any spices      [id number] Jessica Arshad, [address] [place] [address], [place] [address]. All rights reserved. This information is not intended as a substitute for professional medical care. Always follow your  healthcare professional's instructions.      Understanding Colon and Rectal Polyps     The colon has a smooth lining composed of millions of cells.     The colon (also called the large intestine) is a muscular tube that forms the last part of the digestive tract. It absorbs water and stores food waste. The colon is about 4 to 6 feet long. The rectum is the last 6 inches of the colon. The colon and rectum have a smooth lining composed of millions of cells. Changes in these cells can lead to growths in the colon that can become cancerous and should be removed.     When the Colon Lining Changes  Changes that occur in the cells that line the colon or rectum can lead to growths called polyps. Over a period of years, polyps can turn cancerous. Removing polyps early may prevent cancer from ever forming.      Polyps  Polyps are fleshy clumps of tissue that form on the lining of the colon or rectum. Small polyps are usually benign (not cancerous). However, over time, cells in a polyp can change and become cancerous. The larger a polyp grows, the more likely this is to happen. Also, certain types of polyps known as adenomatous polyps are considered premalignant. This means that they will almost always become cancerous if they re not removed.          Cancer  Almost all colorectal cancers start when polyp cells begin growing abnormally. As a cancerous tumor grows, it may involve more and more of the colon or rectum. In time, cancer can also grow beyond the colon or rectum and spread to nearby organs or to glands called lymph nodes. The cells can also travel to other parts of the body. This is known as metastasis. The earlier a cancerous tumor is removed, the better the chance of preventing its spread.        [id number] [employer] [facility], [address] [place] [address], [place] [address]. All rights reserved. This information is not intended as a substitute for professional medical care. Always follow your healthcare professional's  instructions.

## 2021-12-10 NOTE — H&P
Pre-Endoscopy History and Physical     Angeli Jacobson MRN# [id number]   YOB: 1950 Age: 71 year old     Date of Procedure: 12/10/2021  Primary care provider: Sepideh Burris  Type of Endoscopy: Colonoscopy with possible biopsy, possible polypectomy  Reason for Procedure: polyp  Type of Anesthesia Anticipated: Conscious Sedation    HPI:    Angeli is a 71 year old female who will be undergoing the above procedure.      A history and physical has been performed. The patient's medications and allergies have been reviewed. The risks and benefits of the procedure and the sedation options and risks were discussed with the patient.  All questions were answered and informed consent was obtained.      She denies a personal or family history of anesthesia complications or bleeding disorders.     Patient Active Problem List   Diagnosis     MULTINODUL GOITER     Hearing loss     Hyperlipidemia LDL goal <70     Osteoporosis     Impaired fasting glucose     Lymphedema of left leg     Tubular adenoma     Chronic pain of left knee     Vertigo     Myxoid liposarcoma (HCC)     PAD (peripheral artery disease) (H)     Vascular graft occlusion (H)     Femoral-popliteal bypass graft occlusion, left (H)     History of sarcoma     S/P ORIF (open reduction internal fixation) fracture     Hip pain, left     Postural urinary incontinence     Personal history of urinary tract infection     OAB (overactive bladder)     Myalgia of pelvic floor     Lesion of bladder     S/P total hip arthroplasty     Acute pain of left knee     Closed fracture of left tibia and fibula, initial encounter     Closed displaced fracture of left patella, unspecified fracture morphology, initial encounter     Other specified injury of left quadriceps muscle, fascia and tendon, initial encounter     Elevated coronary artery calcium score=7/1/21         Past Medical History:   Diagnosis Date     * * * SBE PROPHYLAXIS * * * 1998    Amox 500mg, take  4 tabs one hour prior to procedure.Takes this because of lymphedema secondary from leg surgey     Central serous retinopathy 2001    Resolved 9/2001     CHRONIC NECK PAIN 1995     Depressive disorder      Depressive disorder, not elsewhere classified 2001     Elevated coronary artery calcium score=7/1/21  8/3/2021     Elevated coronary artery calcium score=7/1/21  8/3/2021     History of blood transfusion 04/2019 12/2020    during left hip pinning, during surgery for patella     MIXED HYPERLIPIDEMIA, LDL GOAL <160 1998    LDL goal < 160     Motion sickness      MYXOID LIPOSARCOMA 2000    Left thigh, S/P excision, radiation  at [facility]     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    CHRONIC LEFT THIGH LYMPHEDEMA     Nontoxic multinodular goiter 2005    needs yearly US     Osteoporosis, unspecified 2001     Other chronic pain     fx ribs left      Other lymphedema 2000    left thigh, gets regular PT for this     PAD (peripheral artery disease) (H) 4/20/2018     PONV (postoperative nausea and vomiting)      SHINGLES 2001     Sprain of lumbosacral (joint) (ligament) 1995    right     Unspecified hearing loss 1998    chronic tinnitus     Unspecified tinnitus 1998        Past Surgical History:   Procedure Laterality Date     ARTHROPLASTY HIP Left 10/4/2019    Procedure: Removal of left femoral hardware with a conversion to left total hip arthroplasty using a Biomet Annalisa femoral stem with an OsseoTi acetabular shell and a dual mobility bearing surface;  Surgeon: Spencer Celeste MD;  Location:  OR     BYPASS GRAFT FEMOROPOPLITEAL Left 1/21/2019    Procedure: LEFT FEMORAL TO ABOVE KNEE POPLITEAL  BYPASS WITH POLYTETRAFLUOROETHYLENE GRAFT;  Surgeon: Shade Owens MD;  Location:  OR     C APPENDECTOMY      Appendectomy     COLONOSCOPY N/A 2/5/2016    Procedure: COMBINED COLONOSCOPY, SINGLE OR MULTIPLE BIOPSY/POLYPECTOMY BY BIOPSY;  Surgeon: Varun Stanley MD, MD;  Location:  GI     CYSTOSCOPY       EXCISION  MALIG LESION>1.25CM  5/2000    Myxoid Liposarcoma       EXPLORE GROIN Right 5/1/2018    Procedure: EXPLORE GROIN;  EMERGENCY RIGHT FEMORAL EXPLORATION WITH FEMORAL ARTERY REPAIR.    EBL: 50mL;  Surgeon: Shade Owens MD;  Location: [facility] OR     HC COLP CERVIX/UPPER VAGINA  07/1997    Negative     HC DILATION/CURETTAGE DIAG/THER NON OB  02/1997    Post menopausal bleeding on HRT, negative     HIP SURGERY Left 04/13/2019     IR ANGIOGRAM THROUGH CATHETER FOLLOW UP  12/20/2018     IR ANGIOGRAM THROUGH CATHETER FOLLOW UP  12/21/2018     IR LOWER EXTREMITY ANGIOGRAM LEFT  12/19/2018     OPEN REDUCTION INTERNAL FIXATION PATELLA Left 12/21/2020    Procedure: Left partial patella fracture excision with advancement of the quadriceps tendon;  Surgeon: Stephen Rhodes MD;  Location: [facility] OR     OPEN REDUCTION INTERNAL FIXATION RODDING INTRAMEDULLARY TIBIA Left 12/21/2020    Procedure: Open reduction intramedullary nailing of left tibia fracture;  Surgeon: Stephen Rhodes MD;  Location:  OR     REPAIR TENDON QUADRICEPS Left 7/28/2021    Procedure: Left knee quadricepsplasty with intraoperative patellar fracture requiring open reduction and internal fixation of the patella;  Surgeon: Spencer Celeste MD;  Location: [facility] OR     VASCULAR SURGERY  04/30/2018    Left SFA stent in bypass graft     ZZC NONSPECIFIC PROCEDURE  04/2000    Open Biopsy Left Thigh Liposarcoma     ZZHC COLONOSCOPY THRU STOMA, DIAGNOSTIC  2006    due 2010       Social History     Tobacco Use     Smoking status: Never Smoker     Smokeless tobacco: Never Used   Substance Use Topics     Alcohol use: No       Family History   Problem Relation Age of Onset     C.A.D. Father         MI 57     Alcohol/Drug Father         etoh     Obesity Mother      Osteoporosis Mother      Colon Cancer Brother 70     Hyperlipidemia Son      Hyperlipidemia Son         very high, experimental drug     C.A.D. Paternal Grandmother         ascvd     Diabetes Maternal  Grandmother      Cancer Maternal Grandmother      YOON.A.D. Paternal Uncle         Mi  age 48     Cancer Maternal Aunt         pancreatic CA     Hyperlipidemia Son        Prior to Admission medications    Medication Sig Start Date End Date Taking? Authorizing Provider   acetaminophen (TYLENOL) 325 MG tablet Take 2 tablets (650 mg) by mouth every 4 hours as needed for other (mild pain) 21   Spencer Celeste MD   alendronate (FOSAMAX) 70 MG tablet TAKE 1 TABLET BY MOUTH EVERY 7 DAYS W/8 OZ WATER 30 MIN BEFORE BREAKFAST/REMAIN UPRIGHT 21   Sepideh Burris PA-C   aspirin (ASA) 81 MG chewable tablet Take 81 mg by mouth daily    Reported, Patient   calcium carbonate 600 mg-vitamin D 400 units (CALTRATE) 600-400 MG-UNIT per tablet Take 1 chew tab by mouth daily    Reported, Patient   clopidogrel (PLAVIX) 75 MG tablet Take 1 tablet (75 mg) by mouth daily 21   Tiesha Prince MD   evolocumab (REPATHA) 140 MG/ML prefilled autoinjector Inject 1 mL (140 mg) Subcutaneous every 14 days 21   Spencer Wilder MD   ezetimibe (ZETIA) 10 MG tablet Take 1 tablet (10 mg) by mouth daily 21   Tiesha Prince MD   fesoterodine fumarate (TOVIAZ) 4 MG TB24 24 hr tablet Take 1 tablet (4 mg) by mouth daily 21   Jennifer Oquendo MD   levothyroxine (SYNTHROID/LEVOTHROID) 75 MCG tablet Take 1 tablet (75 mcg) by mouth daily 21   Tiesha Prince MD   multivitamin, therapeutic (THERA-VIT) TABS tablet Take 1 tablet by mouth daily    Reported, Patient   nitroFURantoin macrocrystal-monohydrate (MACROBID) 100 MG capsule TAKE 1 CAPSULE BY MOUTH AFTER INTRCOURSE 3/17/21   Sepideh Burris PA-C   order for DME Equipment being ordered: Left Thigh High Compression Stocking, 550 CCL.3 16   Yanelis Bailey MD   ORDER FOR DME Equipment being ordered: lymphedema bandages 10/14/14   Yanelis Bailey MD   rosuvastatin (CRESTOR) 20 MG tablet Take 1 tablet (20 mg)  "by mouth daily 9/21/21   Tiesha Prince MD   Vitamin D3 (CHOLECALCIFEROL) 25 mcg (1000 units) tablet Take 2 tablets (50 mcg) by mouth daily 2/11/21   Sepideh Burris PA-C       Allergies   Allergen Reactions     Celexa [Citalopram Hydrobromide]      Decreased libido        REVIEW OF SYSTEMS:   5 point ROS negative except as noted above in HPI, including Gen., Resp., CV, GI &  system review.    PHYSICAL EXAM:   [facility] 03/18/2005  Estimated body mass index is 23.43 kg/m  as calculated from the following:    Height as of 9/28/21: 1.689 m (5' 6.5\").    Weight as of 10/14/21: 66.9 kg (147 lb 6.4 oz).   GENERAL APPEARANCE: alert, and oriented  MENTAL STATUS: alert  AIRWAY EXAM: Mallampatti Class I (visualization of the soft palate, fauces, uvula, anterior and posterior pillars)  RESP: lungs clear to auscultation - no rales, rhonchi or wheezes  CV: regular rates and rhythm  DIAGNOSTICS:    Not indicated    IMPRESSION   ASA Class 2 - Mild systemic disease    PLAN:   Plan for Colonoscopy with possible biopsy, possible polypectomy. We discussed the risks, benefits and alternatives and the patient wished to proceed.    The above has been forwarded to the consulting provider.      Signed Electronically by: Dayton Luna MD  December 10, 2021          "

## 2021-12-10 NOTE — LETTER
November 29, 2021      Angeli Jacobson  [address]  [place] [address]        Dear Angeli,   Please be aware that coverage of these services is subject to the terms and limitations of your health insurance plan.  Call member services at your health plan with any benefit or coverage questions.    Thank you for choosing [facility]. You are scheduled for the following service(s):    Date: 12/10/21             Procedure:  COLONOSCOPY  Doctor:      Dr Luna   Arrival Time:   1:15 pm  *Enter and check in at the Main Hospital Entrance*  Procedure Time: 2 pm      Location:   [facility]        Endoscopy Department, First Floor         [address] [place], [place] [address] [phone] or [phone] ([facility]) to reschedule      MIRALAX -GATORADE  PREP  Colonoscopy is the most accurate test to detect colon polyps and colon cancer; and the only test where polyps can be removed. During this procedure, a doctor examines the lining of your large intestine and rectum through a flexible tube.   Transportation  You must arrange for a ride for the day of your procedure with a responsible adult. A taxi , Uber, etc, is not an option unless you are accompanied by a responsible adult. If you fail to arrange transportation with a responsible adult, your procedure will be cancelled and rescheduled.    Purchase the  following supplies at your local pharmacy:  - 2 (two) bisacodyl tablets: each tablet contains 5 mg.  (Dulcolax  laxative NOT Dulcolax  stool softener)   - 1 (one) 8.3 oz bottle of Polyethylene Glycol (PEG) 3350 Powder   (MiraLAX , Smooth LAX , ClearLAX  or equivalent)  - 64 oz Gatorade    Regular Gatorade, Gatorade G2 , Powerade , Powerade Zero  or Pedialyte  is acceptable. Red colored flavors are not allowed; all other colors (yellow, green, orange, purple and blue) are okay. It is also okay to buy two 2.12 oz packets of powdered Gatorade  that can be mixed with water to a total volume of 64 oz of liquid.  - 1 (one) 10 oz bottle of Magnesium Citrate (Red colored flavors are not allowed)  It is also okay for you to use a 0.5 oz package of powdered magnesium citrate (17 g) mixed with 10 oz of water.      PREPARATION FOR COLONOSCOPY    7 days before:    Discontinue fiber supplements and medications containing iron. This includes Metamucil  and Fibercon ; and multivitamins with iron.    3 days before:    Begin a low-fiber diet. A low-fiber diet helps making the cleanout more effective.     Examples of a low-fiber diet include (but are not limited to): white bread, white rice, pasta, crackers, fish, chicken, eggs, ground beef, creamy peanut butter, cooked/steamed/boiled vegetables, canned fruit, bananas, melons, milk, plain yogurt cheese, salad dressing and other condiments.     The following are not allowed on a low-fiber diet: seeds, nuts, popcorn, bran, whole wheat, corn, quinoa, raw fruits and vegetables, berries and dried fruit, beans and lentils.    For additional details on low-fiber diet, please refer to the table on the last page.    2 days before:    Continue the low-fiber diet.     Drink at least 8 glasses of water throughout the day.     Stop eating solid foods at 11:45 pm.    1 day before:    In the morning: begin a clear liquid diet (liquids you can see through).     Examples of a clear liquid diet include: water, clear broth or bouillon, Gatorade, Pedialyte or Powerade, carbonated and non-carbonated soft drinks (Sprite , 7-Up , ginger ale), strained fruit juices without pulp (apple, white grape, white cranberry), Jell-O  and popsicles.     The following are not allowed on a clear liquid diet: red liquids, alcoholic beverages, dairy products (milk, creamer, and yogurt), protein shakes, creamy broths, juice with pulp and chewing tobacco.    At noon: take 2 (two) bisacodyl tablets     At 4 (and no later than 6pm): start drinking the  Miralax-Gatorade preparation (8.3 oz of Miralax mixed with 64 oz of Gatorade in a large pitcher). Drink 1(one) 8 oz glass every 15 minutes thereafter, until the mixture is gone.    COLON CLEANSING TIPS: drink adequate amounts of fluids before and after your colon cleansing to prevent dehydration. Stay near a toilet because you will have diarrhea. Even if you are sitting on the toilet, continue to drink the cleansing solution every 15 minutes. If you feel nauseous or vomit, rinse your mouth with water, take a 15 to 30-minute-break and then continue drinking the solution. You will be uncomfortable until the stool has flushed from your colon (in about 2 to 4 hours). You may feel chilled.    Day of your procedure  You may take all of your morning medications including blood pressure medications, blood thinners (if you have not been instructed to stop these by our office), methadone, anti-seizure medications with sips of water 3 hours prior to your procedure or earlier. Do not take insulin or vitamins prior to your procedure. Continue the clear liquid diet.       4 hours prior: drink 10 oz of magnesium citrate. It may be easier to drink it with a straw.    STOP consuming all liquids after that.     Do not take anything by mouth during this time.     Allow extra time to travel to your procedure as you may need to stop and use a restroom along the way.    You are ready for the procedure, if you followed all instructions and your stool is no longer formed, but clear or yellow liquid. If you are unsure whether your colon is clean, please call our office at [phone] before you leave for your appointment.    Bring the following to your procedure:  - Insurance Card/Photo ID.   - List of current medications including over-the-counter medications and supplements.   - Your rescue inhaler if you currently use one to control asthma.    Canceling or rescheduling your appointment:   If you must cancel or reschedule your  appointment, please call [phone] as soon as possible.      COLONOSCOPY PRE-PROCEDURE CHECKLIST    If you have diabetes, ask your regular doctor for diet and medication restrictions.  If you take an anticoagulant or anti-platelet medication (such as Coumadin , Lovenox , Pradaxa , Xarelto , Eliquis , etc.), please call your primary doctor for advice on holding this medication.  If you take aspirin you may continue to do so.  If you are or may be pregnant, please discuss the risks and benefits of this procedure with your doctor.        What happens during a colonoscopy?    Plan to spend up to two hours, starting at registration time, at the endoscopy center the day of your procedure. The colonoscopy takes an average of 15 to 30 minutes. Recovery time is about 30 minutes.      Before the exam:    You will change into a gown.    Your medical history and medication list will be reviewed with you, unless that has been done over the phone prior to the procedure.     A nurse will insert an intravenous (IV) line into your hand or arm.    The doctor will meet with you and will give you a consent form to sign.  During the exam:     Medicine will be given through the IV line to help you relax.     Your heart rate and oxygen levels will be monitored. If your blood pressure is low, you may be given fluids through the IV line.     The doctor will insert a flexible hollow tube, called a colonoscope, into your rectum. The scope will be advanced slowly through the large intestine (colon).    You may have a feeling of fullness or pressure.     If an abnormal tissue or a polyp is found, the doctor may remove it through the endoscope for closer examination, or biopsy. Tissue removal is painless    After the exam:           Any tissue samples removed during the exam will be sent to a lab for evaluation. It may take 5-7 working days for you to be notified of the results.     A nurse will provide you with complete discharge  instructions before you leave the endoscopy center. Be sure to ask the nurse for specific instructions if you take blood thinners such as Aspirin, Coumadin or Plavix.     The doctor will prepare a full report for you and for the physician who referred you for the procedure.     Your doctor will talk with you about the initial results of your exam.      Medication given during the exam will prohibit you from driving for the rest of the day.     Following the exam, you may resume your normal diet. Your first meal should be light, no greasy foods. Avoid alcohol until the next day.     You may resume your regular activities the day after the procedure.         LOW-FIBER DIET    Foods RECOMMENDED Foods to AVOID   Breads, Cereal, Rice and Pasta:   White bread, rolls, biscuits, croissant and ken toast.   Waffles, German toast and pancakes.   White rice, noodles, pasta, macaroni and peeled cooked potatoes.   Plain crackers and saltines.   Cooked cereals: farina, cream of rice.   Cold cereals: Puffed Rice , Rice Krispies , Corn Flakes  and Special K    Breads, Cereal, Rice and Pasta:   Breads or rolls with nuts, seeds or fruit.   Whole wheat, pumpernickel, rye breads and cornbread.   Potatoes with skin, brown or wild rice, and kasha (buckwheat).     Vegetables:   Tender cooked and canned vegetables without seeds: carrots, asparagus tips, green or wax beans, pumpkin, spinach, lima beans. Vegetables:   Raw or steamed vegetables.   Vegetables with seeds.   Sauerkraut.   Winter squash, peas, broccoli, Brussel sprouts, cabbage, onions, cauliflower, baked beans, peas and corn.   Fruits:   Strained fruit juice.   Canned fruit, except pineapple.   Ripe bananas and melon. Fruits:   Prunes and prune juice.   Raw fruits.   Dried fruits: figs, dates and raisins.   Milk/Dairy:   Milk: plain or flavored.   Yogurt, custard and ice cream.   Cheese and cottage cheese Milk/Dairy:     Meat and other proteins:   ground, well-cooked tender  beef, lamb, ham, veal, pork, fish, poultry and organ meats.   Eggs.   Peanut butter without nuts. Meat and other proteins:   Tough, fibrous meats with gristle.   Dry beans, peas and lentils.   Peanut butter with nuts.   Tofu.   Fats, Snack, Sweets, Condiments and Beverages:   Margarine, butter, oils, mayonnaise, sour cream and salad dressing, plain gravy.   Sugar, hard candy, clear jelly, honey and syrup.   Spices, cooked herbs, bouillon, broth and soups made with allowed vegetable, ketchup and mustard.   Coffee, tea and carbonated drinks.   Plain cakes, cookies and pretzels.   Gelatin, plain puddings, custard, ice cream, sherbet and popsicles. Fats, Snack, Sweets, Condiments and Beverages:   Nuts, seeds and coconut.   Jam, marmalade and preserves.   Pickles, olives, relish and horseradish.   All desserts containing nuts, seeds, dried fruit and coconut; or made from whole grains or bran.   Candy made with nuts or seeds.   Popcorn.         DIRECTIONS TO THE ENDOSCOPY DEPARTMENT    From the north ([facility])  Take [address] South, exit on [address] [address]. Get into the left hand blaze, turn left (east), go one-half mile to [address] and turn left. Go north to the second stoplight, take a right on [address] and follow it to the Main Hospital entrance.    From the south ([facility])  Take [address]N to the [address] split and exit on [address] [address]. On [address] [address], turn left (west) to [address]. Turn right (north) on [address]. Go north to the second stoplight, take a right on [address] and follow it to the Main Hospital entrance.    From the east via 35E ([facility])  Take [address] south to [address] [address] exit. Turn right on [address] [address]. Go west to [address]. Turn right (north) on [address]. Go to the second stoplight, take a right on [address] to the Main Hospital entrance.    From the east via [address] ([facility])  Take [facility] [address]  West to [address]. Turn left (south) on [address] to [address], turn left (east) on [address] and follow it to the Main Hospital entrance.    From the west via [address] ([place], [place])  Take [address] east to [address]. Turn right (south) on [address] to [address], turn left (east) on [address] and follow it to the Main Hospital entrance.

## 2021-12-11 DIAGNOSIS — Z11.59 ENCOUNTER FOR SCREENING FOR OTHER VIRAL DISEASES: ICD-10-CM

## 2021-12-12 ENCOUNTER — MYC MEDICAL ADVICE (OUTPATIENT)
Dept: FAMILY MEDICINE | Facility: CLINIC | Age: 71
End: 2021-12-12
Payer: COMMERCIAL

## 2021-12-12 ENCOUNTER — MYC MEDICAL ADVICE (OUTPATIENT)
Dept: SURGERY | Facility: CLINIC | Age: 71
End: 2021-12-12
Payer: COMMERCIAL

## 2021-12-13 NOTE — TELEPHONE ENCOUNTER
Please see [employer] message below and advise.     Vika LARA, RN    [facility]  Vascular [facility]  Office: [phone]  Fax: [phone]

## 2021-12-14 ENCOUNTER — ANCILLARY PROCEDURE (OUTPATIENT)
Dept: BONE DENSITY | Facility: CLINIC | Age: 71
End: 2021-12-14
Payer: COMMERCIAL

## 2021-12-14 DIAGNOSIS — Z78.0 ASYMPTOMATIC POSTMENOPAUSAL STATUS: ICD-10-CM

## 2021-12-14 DIAGNOSIS — Z78.0 ASYMPTOMATIC MENOPAUSAL STATE: ICD-10-CM

## 2021-12-14 LAB
PATH REPORT.COMMENTS IMP SPEC: NORMAL
PATH REPORT.COMMENTS IMP SPEC: NORMAL
PATH REPORT.FINAL DX SPEC: NORMAL
PATH REPORT.GROSS SPEC: NORMAL
PATH REPORT.MICROSCOPIC SPEC OTHER STN: NORMAL
PATH REPORT.RELEVANT HX SPEC: NORMAL
PHOTO IMAGE: NORMAL

## 2021-12-14 PROCEDURE — 88305 TISSUE EXAM BY PATHOLOGIST: CPT | Mod: 26 | Performed by: PATHOLOGY

## 2021-12-14 PROCEDURE — 77085 DXA BONE DENSITY AXL VRT FX: CPT | Performed by: INTERNAL MEDICINE

## 2021-12-14 PROCEDURE — 77081 DXA BONE DENSITY APPENDICULR: CPT | Performed by: INTERNAL MEDICINE

## 2021-12-14 NOTE — TELEPHONE ENCOUNTER
Returned call to patient.  Provided instructions per Dr. Prince.  Pt had no further questions and appreciated the call.    Nicol Buenrostro, CHERISEN, RN-[facility] [place]

## 2021-12-15 NOTE — PROGRESS NOTES
Discharge Note    Progress reporting period is from initial eval to Oct 28, 2021.     Angeli failed to return for next follow up visit and current status is unknown.  Please see information below for last relevant information on current status.  Patient seen for Rxs Used: 8 visits.  SUBJECTIVE  Subjective changes noted by patient:  Subjective: Leaving for vacation x 3 weeks  .  Current pain level is  .     Previous pain level was  Initial Pain level: 0/10.   Changes in function:  Yes (See Goal flowsheet attached for changes in current functional level)  Adverse reaction to treatment or activity: None    OBJECTIVE  Changes noted in objective findings: Objective: ROM: 0-0-62.5     ASSESSMENT/PLAN  Diagnosis: S/P L knee ORIF pat (fx during quadriplasty advancement suregery)   DIAGP:  Diagnoses of S/P ORIF (open reduction internal fixation) fracture and Chronic pain of left knee were pertinent to this visit.  Updated problem list and treatment plan:   Pain - HEP  Decreased ROM/flexibility - HEP  Decreased function - HEP  Decreased strength - HEP  Impaired muscle performance - HEP  Impaired gait - HEP  Decreased proprioception - HEP  STG/LTGs have been met or progress has been made towards goals:  Yes, please see goal flowsheet for most current information  Assessment of Progress: current status is unknown.  Last current status:     Self Management Plans:  HEP  I have re-evaluated this patient and find that the nature, scope, duration and intensity of the therapy is appropriate for the medical condition of the patient.  Angeli continues to require the following intervention to meet STG and LTG's:  HEP.    Recommendations:  Discharge with current home program.  Patient to follow up with MD as needed.    Please refer to the daily flowsheet for treatment today, total treatment time and time spent performing 1:1 timed codes.

## 2021-12-16 SDOH — ECONOMIC STABILITY: FOOD INSECURITY: WITHIN THE PAST 12 MONTHS, YOU WORRIED THAT YOUR FOOD WOULD RUN OUT BEFORE YOU GOT MONEY TO BUY MORE.: NEVER TRUE

## 2021-12-16 SDOH — ECONOMIC STABILITY: FOOD INSECURITY: WITHIN THE PAST 12 MONTHS, THE FOOD YOU BOUGHT JUST DIDN'T LAST AND YOU DIDN'T HAVE MONEY TO GET MORE.: NEVER TRUE

## 2021-12-16 SDOH — HEALTH STABILITY: PHYSICAL HEALTH: ON AVERAGE, HOW MANY MINUTES DO YOU ENGAGE IN EXERCISE AT THIS LEVEL?: 0 MIN

## 2021-12-16 SDOH — HEALTH STABILITY: PHYSICAL HEALTH: ON AVERAGE, HOW MANY DAYS PER WEEK DO YOU ENGAGE IN MODERATE TO STRENUOUS EXERCISE (LIKE A BRISK WALK)?: 0 DAYS

## 2021-12-16 SDOH — ECONOMIC STABILITY: TRANSPORTATION INSECURITY
IN THE PAST 12 MONTHS, HAS THE LACK OF TRANSPORTATION KEPT YOU FROM MEDICAL APPOINTMENTS OR FROM GETTING MEDICATIONS?: NO

## 2021-12-16 SDOH — ECONOMIC STABILITY: TRANSPORTATION INSECURITY
IN THE PAST 12 MONTHS, HAS LACK OF TRANSPORTATION KEPT YOU FROM MEETINGS, WORK, OR FROM GETTING THINGS NEEDED FOR DAILY LIVING?: NO

## 2021-12-16 SDOH — ECONOMIC STABILITY: INCOME INSECURITY: HOW HARD IS IT FOR YOU TO PAY FOR THE VERY BASICS LIKE FOOD, HOUSING, MEDICAL CARE, AND HEATING?: NOT HARD AT ALL

## 2021-12-16 SDOH — ECONOMIC STABILITY: INCOME INSECURITY: IN THE LAST 12 MONTHS, WAS THERE A TIME WHEN YOU WERE NOT ABLE TO PAY THE MORTGAGE OR RENT ON TIME?: NO

## 2021-12-16 ASSESSMENT — SOCIAL DETERMINANTS OF HEALTH (SDOH)
HOW OFTEN DO YOU GET TOGETHER WITH FRIENDS OR RELATIVES?: NEVER
DO YOU BELONG TO ANY CLUBS OR ORGANIZATIONS SUCH AS CHURCH GROUPS UNIONS, FRATERNAL OR ATHLETIC GROUPS, OR SCHOOL GROUPS?: YES
HOW OFTEN DO YOU ATTEND CHURCH OR RELIGIOUS SERVICES?: MORE THAN 4 TIMES PER YEAR
IN A TYPICAL WEEK, HOW MANY TIMES DO YOU TALK ON THE PHONE WITH FAMILY, FRIENDS, OR NEIGHBORS?: NEVER

## 2021-12-16 ASSESSMENT — LIFESTYLE VARIABLES
HOW MANY STANDARD DRINKS CONTAINING ALCOHOL DO YOU HAVE ON A TYPICAL DAY: 1 OR 2
HOW OFTEN DO YOU HAVE SIX OR MORE DRINKS ON ONE OCCASION: NEVER
HOW OFTEN DO YOU HAVE A DRINK CONTAINING ALCOHOL: MONTHLY OR LESS

## 2021-12-17 NOTE — PROGRESS NOTES
Pre-Visit Planning   Next 5 appointments (look out 90 days)    Dec 21, 2021  9:20 AM  (Arrive by 9:00 AM)  Pre-Operative Physical with Sepideh Burris PA-C  [facility] ([facility] ) [address] [facility] [address] [phone]        Appointment Notes for this encounter:   Pre Op Physical 1/5/2022 L knee    Questionnaires Reviewed/Assigned  No additional questionnaires are needed   Current Outpatient Medications   Medication     acetaminophen (TYLENOL) 325 MG tablet     aspirin (ASA) 81 MG chewable tablet     calcium carbonate 600 mg-vitamin D 400 units (CALTRATE) 600-400 MG-UNIT per tablet     clopidogrel (PLAVIX) 75 MG tablet     evolocumab (REPATHA) 140 MG/ML prefilled autoinjector     ezetimibe (ZETIA) 10 MG tablet     fesoterodine fumarate (TOVIAZ) 4 MG TB24 24 hr tablet     levothyroxine (SYNTHROID/LEVOTHROID) 75 MCG tablet     multivitamin, therapeutic (THERA-VIT) TABS tablet     nitroFURantoin macrocrystal-monohydrate (MACROBID) 100 MG capsule     order for DME     ORDER FOR DME     rosuvastatin (CRESTOR) 20 MG tablet     Vitamin D3 (CHOLECALCIFEROL) 25 mcg (1000 units) tablet     No current facility-administered medications for this visit.     Health Maintenance Due   Topic Date Due     URINE DRUG SCREEN  Never done     MEDICARE ANNUAL WELLNESS VISIT  09/09/2021     ANNUAL REVIEW OF HM ORDERS  09/09/2021         Patient preferred phone number: [phone]    Unable to reach. Left voicemail. [phone]  Althea Mccloud. RN, BSN, PAL (Patient Advocate Liaison)  [facility]   [phone]

## 2021-12-21 ENCOUNTER — OFFICE VISIT (OUTPATIENT)
Dept: FAMILY MEDICINE | Facility: CLINIC | Age: 71
End: 2021-12-21
Payer: COMMERCIAL

## 2021-12-21 VITALS
WEIGHT: 147.4 LBS | TEMPERATURE: 97.4 F | BODY MASS INDEX: 23.43 KG/M2 | HEART RATE: 86 BPM | OXYGEN SATURATION: 99 % | DIASTOLIC BLOOD PRESSURE: 82 MMHG | RESPIRATION RATE: 16 BRPM | SYSTOLIC BLOOD PRESSURE: 121 MMHG

## 2021-12-21 DIAGNOSIS — Z01.818 PREOP GENERAL PHYSICAL EXAM: Primary | ICD-10-CM

## 2021-12-21 DIAGNOSIS — B35.1 ONYCHOMYCOSIS: ICD-10-CM

## 2021-12-21 LAB
ERYTHROCYTE [DISTWIDTH] IN BLOOD BY AUTOMATED COUNT: 17.8 % (ref 10–15)
HCT VFR BLD AUTO: 43.2 % (ref 35–47)
HGB BLD-MCNC: 13.8 G/DL (ref 11.7–15.7)
MCH RBC QN AUTO: 27.6 PG (ref 26.5–33)
MCHC RBC AUTO-ENTMCNC: 31.9 G/DL (ref 31.5–36.5)
MCV RBC AUTO: 86 FL (ref 78–100)
PLATELET # BLD AUTO: 248 10E3/UL (ref 150–450)
RBC # BLD AUTO: 5 10E6/UL (ref 3.8–5.2)
WBC # BLD AUTO: 7.6 10E3/UL (ref 4–11)

## 2021-12-21 PROCEDURE — 99214 OFFICE O/P EST MOD 30 MIN: CPT | Performed by: PHYSICIAN ASSISTANT

## 2021-12-21 PROCEDURE — 85027 COMPLETE CBC AUTOMATED: CPT | Performed by: PHYSICIAN ASSISTANT

## 2021-12-21 PROCEDURE — 36415 COLL VENOUS BLD VENIPUNCTURE: CPT | Performed by: PHYSICIAN ASSISTANT

## 2021-12-21 RX ORDER — ECONAZOLE NITRATE 10 MG/G
CREAM TOPICAL DAILY
Qty: 85 G | Refills: 3 | Status: SHIPPED | OUTPATIENT
Start: 2021-12-21 | End: 2023-05-01

## 2021-12-21 NOTE — PATIENT INSTRUCTIONS

## 2021-12-21 NOTE — PROGRESS NOTES
[facility]  [address] [place] [address]  Phone: [phone]  Primary Provider: Sepideh Isabel  Pre-op Performing Provider: SEPIDEH ISABEL      PREOPERATIVE EVALUATION:  Today's date: 12/21/2021    Angeli Jacobson is a 71 year old female who presents for a preoperative evaluation.    Surgical Information:  Surgery/Procedure: Left Knee Z-Plasty Quadricepsplasty and Left Knee Patella Hardware Removal  Surgery Location: [facility]   Surgeon: Roula Null   Surgery Date: 01/05/2022  Time of Surgery: 10:15am  Where patient plans to recover: At home with family  Fax number for surgical facility: Note does not need to be faxed, will be available electronically in Epic.    Type of Anesthesia Anticipated: Choice    Assessment & Plan     The proposed surgical procedure is considered INTERMEDIATE risk.    Preop general physical exam  COVID test ordered. Due 72 hours prior to procedure.   - CBC with platelets; Future  - CBC with platelets             Risks and Recommendations:  The patient has the following additional risks and recommendations for perioperative complications:            Medication Instructions:   - aspirin: Discontinue aspirin 7-10 days prior to procedure to reduce bleeding risk. It should be resumed postoperatively.    - clopidrogel (Plavix), prasugrel (Effient), ticagrelor (Brilinta): No contraindication to stopping Plavix, HOLD 5-7 days before surgery.    - Statins: Continue taking on the day of surgery.     RECOMMENDATION:  APPROVAL GIVEN to proceed with proposed procedure, without further diagnostic evaluation.      Subjective     HPI related to upcoming procedure: left knee pain/stiffness    Preop Questions 12/21/2021   1. Have you ever had a heart attack or stroke? No   2. Have you ever had surgery on your heart or blood vessels, such as a stent placement, a coronary artery bypass, or surgery on an artery in your head,  neck, heart, or legs? YES - stent in leg  H/o vascular graft occlusion    3. Do you have chest pain with activity? No   4. Do you have a history of  heart failure? No   5. Do you currently have a cold, bronchitis or symptoms of other infection? No   6. Do you have a cough, shortness of breath, or wheezing? No   7. Do you or anyone in your family have previous history of blood clots? No   8. Do you or does anyone in your family have a serious bleeding problem such as prolonged bleeding following surgeries or cuts? No   9. Have you ever had problems with anemia or been told to take iron pills? YES - h/o anemia   10. Have you had any abnormal blood loss such as black, tarry or bloody stools, or abnormal vaginal bleeding? No   11. Have you ever had a blood transfusion? YES - in past   11a. Have you ever had a transfusion reaction? No   12. Are you willing to have a blood transfusion if it is medically needed before, during, or after your surgery? Yes   13. Have you or any of your relatives ever had problems with anesthesia? YES - nausea   14. Do you have sleep apnea, excessive snoring or daytime drowsiness? No   14a. Do you have a CPAP machine? No   15. Do you have any artifical heart valves or other implanted medical devices like a pacemaker, defibrillator, or continuous glucose monitor? No   16. Do you have artificial joints? YES - hip   17. Are you allergic to latex? No   18. Is there any chance that you may be pregnant? -No       Health Care Directive:  Patient has a Health Care Directive on file      Preoperative Review of :   reviewed - no record of controlled substances prescribed.      Status of Chronic Conditions:  See problem list for active medical problems.  Problems all longstanding and stable, except as noted/documented.  See ROS for pertinent symptoms related to these conditions.      Review of Systems  CONSTITUTIONAL: NEGATIVE for fever, chills, change in weight  INTEGUMENTARY/SKIN: NEGATIVE for  worrisome rashes, moles or lesions  EYES: NEGATIVE for vision changes or irritation  ENT/MOUTH: NEGATIVE for ear, mouth and throat problems  RESP: NEGATIVE for significant cough or SOB  CV: NEGATIVE for chest pain, palpitations or peripheral edema  GI: NEGATIVE for nausea, abdominal pain, heartburn, or change in bowel habits  : NEGATIVE for frequency, dysuria, or hematuria  NEURO: NEGATIVE for weakness, dizziness or paresthesias  ENDOCRINE: NEGATIVE for temperature intolerance, skin/hair changes  HEME: NEGATIVE for bleeding problems  PSYCHIATRIC: NEGATIVE for changes in mood or affect    Patient Active Problem List    Diagnosis Date Noted     Elevated coronary artery calcium score=7/1/21 08/03/2021     Priority: Medium     Other specified injury of left quadriceps muscle, fascia and tendon, initial encounter 07/28/2021     Priority: Medium     Closed fracture of left tibia and fibula, initial encounter 12/19/2020     Priority: Medium     Closed displaced fracture of left patella, unspecified fracture morphology, initial encounter 12/19/2020     Priority: Medium     Acute pain of left knee 10/29/2019     Priority: Medium     S/P total hip arthroplasty 10/04/2019     Priority: Medium     Myalgia of pelvic floor 09/11/2019     Priority: Medium     Lesion of bladder 09/11/2019     Priority: Medium     Postural urinary incontinence 07/17/2019     Priority: Medium     Personal history of urinary tract infection 07/17/2019     Priority: Medium     OAB (overactive bladder) 07/17/2019     Priority: Medium     S/P ORIF (open reduction internal fixation) fracture 05/02/2019     Priority: Medium     Hip pain, left 05/02/2019     Priority: Medium     History of sarcoma 01/21/2019     Priority: Medium     Femoral-popliteal bypass graft occlusion, 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     Priority: Medium     CHRONIC LEFT THIGH LYMPHEDEMA       Impaired fasting glucose 06/16/2010     Priority: Low     Hearing loss 01/29/2007     Priority: Low     Has hearing aids        Past Medical History:   Diagnosis Date     * * * SBE PROPHYLAXIS * * * 1998    Amox 500mg, take 4 tabs one hour prior to procedure.Takes this because of lymphedema secondary from leg surgey     Central serous retinopathy 2001    Resolved 9/2001     CHRONIC NECK PAIN 1995     Depressive disorder      Depressive disorder, not elsewhere classified 2001     Elevated coronary artery calcium score=7/1/21  8/3/2021     Elevated coronary artery calcium score=7/1/21  8/3/2021     History of blood transfusion 04/2019 12/2020    during left hip pinning, during surgery for patella     MIXED HYPERLIPIDEMIA, LDL GOAL <160 1998    LDL goal < 160     Motion sickness      MYXOID LIPOSARCOMA 2000    Left thigh, S/P excision, radiation  at [facility]     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    CHRONIC LEFT THIGH LYMPHEDEMA     Nontoxic multinodular goiter 2005    needs yearly US      Osteoporosis, unspecified 2001     Other chronic pain     fx ribs left      Other lymphedema 2000    left thigh, gets regular PT for this     PAD (peripheral artery disease) (H) 4/20/2018     PONV (postoperative nausea and vomiting)      SHINGLES 2001     Sprain of lumbosacral (joint) (ligament) 1995    right     Unspecified hearing loss 1998    chronic tinnitus     Unspecified tinnitus 1998     Past Surgical History:   Procedure Laterality Date     ARTHROPLASTY HIP Left 10/4/2019    Procedure: Removal of left femoral hardware with a conversion to left total hip arthroplasty using a Biomet Annalisa femoral stem with an OsseoTi acetabular shell and a dual mobility bearing surface;  Surgeon: Spencer Celeste MD;  Location: [facility] OR     BYPASS GRAFT FEMOROPOPLITEAL Left 1/21/2019    Procedure: LEFT FEMORAL TO ABOVE KNEE POPLITEAL  BYPASS WITH POLYTETRAFLUOROETHYLENE GRAFT;  Surgeon: Shade Owens MD;  Location:  OR     C APPENDECTOMY      Appendectomy     COLONOSCOPY N/A 2/5/2016    Procedure: COMBINED COLONOSCOPY, SINGLE OR MULTIPLE BIOPSY/POLYPECTOMY BY BIOPSY;  Surgeon: Vaurn Stanley MD, MD;  Location:  GI     COLONOSCOPY N/A 12/10/2021    Procedure: COLONOSCOPY, WITH POLYPECTOMIES  USING COLD  SNARE,   CLIP APPLIED X2;  Surgeon: Dayton Luna MD;  Location:  GI     CYSTOSCOPY       EXCISION MALIG LESION>1.25CM  5/2000    Myxoid Liposarcoma       EXPLORE GROIN Right 5/1/2018    Procedure: EXPLORE GROIN;  EMERGENCY RIGHT FEMORAL EXPLORATION WITH FEMORAL ARTERY REPAIR.    EBL: 50mL;  Surgeon: Shade Owens MD;  Location:  OR     HC COLP CERVIX/UPPER VAGINA  07/1997    Negative     HC DILATION/CURETTAGE DIAG/THER NON OB  02/1997    Post menopausal bleeding on HRT, negative     HIP SURGERY Left 04/13/2019     IR ANGIOGRAM THROUGH CATHETER FOLLOW UP  12/20/2018     IR ANGIOGRAM THROUGH CATHETER FOLLOW UP  12/21/2018     IR LOWER EXTREMITY ANGIOGRAM LEFT  12/19/2018     OPEN REDUCTION INTERNAL  FIXATION PATELLA Left 12/21/2020    Procedure: Left partial patella fracture excision with advancement of the quadriceps tendon;  Surgeon: Stephen Rhodes MD;  Location: [facility] OR     OPEN REDUCTION INTERNAL FIXATION RODDING INTRAMEDULLARY TIBIA Left 12/21/2020    Procedure: Open reduction intramedullary nailing of left tibia fracture;  Surgeon: Stephen Rhodes MD;  Location: [facility] OR     REPAIR TENDON QUADRICEPS Left 7/28/2021    Procedure: Left knee quadricepsplasty with intraoperative patellar fracture requiring open reduction and internal fixation of the patella;  Surgeon: Spencer Celeste MD;  Location: [facility] OR     VASCULAR SURGERY  04/30/2018    Left SFA stent in bypass graft     Z NONSPECIFIC PROCEDURE  04/2000    Open Biopsy Left Thigh Liposarcoma     [facility] COLONOSCOPY THRU STOMA, DIAGNOSTIC  2006    due 2010     Current Outpatient Medications   Medication Sig Dispense Refill     acetaminophen (TYLENOL) 325 MG tablet Take 2 tablets (650 mg) by mouth every 4 hours as needed for other (mild pain) 100 tablet 0     aspirin (ASA) 81 MG chewable tablet Take 81 mg by mouth daily       calcium carbonate 600 mg-vitamin D 400 units (CALTRATE) 600-400 MG-UNIT per tablet Take 1 chew tab by mouth daily       clopidogrel (PLAVIX) 75 MG tablet Take 1 tablet (75 mg) by mouth daily 90 tablet 3     evolocumab (REPATHA) 140 MG/ML prefilled autoinjector Inject 1 mL (140 mg) Subcutaneous every 14 days 6 mL 3     ezetimibe (ZETIA) 10 MG tablet Take 1 tablet (10 mg) by mouth daily 90 tablet 3     fesoterodine fumarate (TOVIAZ) 4 MG TB24 24 hr tablet Take 1 tablet (4 mg) by mouth daily 90 tablet 3     levothyroxine (SYNTHROID/LEVOTHROID) 75 MCG tablet Take 1 tablet (75 mcg) by mouth daily 30 tablet 11     multivitamin, therapeutic (THERA-VIT) TABS tablet Take 1 tablet by mouth daily       nitroFURantoin macrocrystal-monohydrate (MACROBID) 100 MG capsule TAKE 1 CAPSULE BY MOUTH AFTER INTRCOURSE 30 capsule 3     order for DME  Equipment being ordered: Left Thigh High Compression Stocking, 550 CCL.3 1 each 99     ORDER FOR DME Equipment being ordered: lymphedema bandages 2 Device 1     rosuvastatin (CRESTOR) 20 MG tablet Take 1 tablet (20 mg) by mouth daily 90 tablet 3     Vitamin D3 (CHOLECALCIFEROL) 25 mcg (1000 units) tablet Take 2 tablets (50 mcg) by mouth daily 60 tablet 0       Allergies   Allergen Reactions     Celexa [Citalopram Hydrobromide]      Decreased libido        Social History     Tobacco Use     Smoking status: Never Smoker     Smokeless tobacco: Never Used   Substance Use Topics     Alcohol use: No     Family History   Problem Relation Age of Onset     C.A.D. Father         MI 57     Alcohol/Drug Father         etoh     Coronary Artery Disease Father      Obesity Mother      Osteoporosis Mother      Colon Cancer Brother 70     Hyperlipidemia Son      Anxiety Disorder Son      Hyperlipidemia Son         very high, experimental drug     C.A.D. Paternal Grandmother         ascvd     Diabetes Maternal Grandmother      Cancer Maternal Grandmother      C.A.D. Paternal Uncle         Mi  age 48     Cancer Maternal Aunt         pancreatic CA     Hyperlipidemia Son      Hyperlipidemia Cousin      History   Drug Use No         Objective     LMP 2005     Physical Exam    GENERAL APPEARANCE: healthy, alert and no distress     EYES: EOMI, PERRL     HENT: ear canals and TM's normal and nose and mouth without ulcers or lesions     NECK: no adenopathy, no asymmetry, masses, or scars and thyroid normal to palpation     RESP: lungs clear to auscultation - no rales, rhonchi or wheezes     CV: regular rates and rhythm, normal S1 S2, no S3 or S4 and no murmur, click or rub     ABDOMEN:  soft, nontender, no HSM or masses and bowel sounds normal     SKIN: no suspicious lesions or rashes     NEURO: Normal strength and tone, sensory exam grossly normal, mentation intact and speech normal     PSYCH: mentation appears normal. and  affect normal/bright     LYMPHATICS: No cervical adenopathy    Recent Labs   Lab Test 09/23/21  1026 09/23/21  1018 09/09/21  0939   HGB  --  12.0 12.2   PLT  --  290 307     --  141   POTASSIUM 4.2  --  4.2   CR 0.63  --  0.73        Diagnostics:  Results for orders placed or performed in visit on 12/21/21   CBC with platelets     Status: Abnormal   Result Value Ref Range    WBC Count 7.6 4.0 - 11.0 10e3/uL    RBC Count 5.00 3.80 - 5.20 10e6/uL    Hemoglobin 13.8 11.7 - 15.7 g/dL    Hematocrit 43.2 35.0 - 47.0 %    MCV 86 78 - 100 fL    MCH 27.6 26.5 - 33.0 pg    MCHC 31.9 31.5 - 36.5 g/dL    RDW 17.8 (H) 10.0 - 15.0 %    Platelet Count 248 150 - 450 10e3/uL        EKG: appears normal, NSR, normal axis, normal intervals, no acute ST/T changes c/w ischemia, no LVH by voltage criteria, unchanged from previous tracings    Revised Cardiac Risk Index (RCRI):  The patient has the following serious cardiovascular risks for perioperative complications:   - Coronary Artery Disease (MI, positive stress test, angina, Qs on EKG) = 1 point     RCRI Interpretation: 1 point: Class II (low risk - 0.9% complication rate)           Signed Electronically by: Sepideh Burris PA-C  Copy of this evaluation report is provided to requesting physician.

## 2021-12-22 ENCOUNTER — MYC MEDICAL ADVICE (OUTPATIENT)
Dept: FAMILY MEDICINE | Facility: CLINIC | Age: 71
End: 2021-12-22
Payer: COMMERCIAL

## 2021-12-22 NOTE — TELEPHONE ENCOUNTER
Sepideh BurrisC     Please see my chart message and advise     Thank you,   Le Thomas, Registered Nurse, PAL (Patient Advocate Liason)   [facility]   [phone]

## 2021-12-29 RX ORDER — ALENDRONATE SODIUM 70 MG/1
70 TABLET ORAL
COMMUNITY
End: 2022-03-31

## 2022-01-04 ENCOUNTER — MYC MEDICAL ADVICE (OUTPATIENT)
Dept: FAMILY MEDICINE | Facility: CLINIC | Age: 72
End: 2022-01-04
Payer: COMMERCIAL

## 2022-01-04 NOTE — TELEPHONE ENCOUNTER
See my chart     Le Thomas, Registered Nurse, PAL (Patient Advocate Liason)   [facility]   [phone]

## 2022-01-06 ENCOUNTER — MYC MEDICAL ADVICE (OUTPATIENT)
Dept: FAMILY MEDICINE | Facility: CLINIC | Age: 72
End: 2022-01-06
Payer: COMMERCIAL

## 2022-02-23 ENCOUNTER — PATIENT OUTREACH (OUTPATIENT)
Dept: ONCOLOGY | Facility: CLINIC | Age: 72
End: 2022-02-23
Payer: COMMERCIAL

## 2022-02-23 ENCOUNTER — TRANSCRIBE ORDERS (OUTPATIENT)
Dept: OTHER | Age: 72
End: 2022-02-23
Payer: COMMERCIAL

## 2022-02-23 DIAGNOSIS — I89.0 LYMPHEDEMA: Primary | ICD-10-CM

## 2022-02-23 DIAGNOSIS — C49.9 LIPOSARCOMA (H): ICD-10-CM

## 2022-02-23 NOTE — PROGRESS NOTES
received referral for Dr Sharma in the PM&R clinic.   Scheduling instructions updated and sent to New Patient Scheduling for completion.

## 2022-02-24 NOTE — PROGRESS NOTES
RECORDS STATUS - ALL OTHER DIAGNOSIS      RECORDS RECEIVED FROM: [facility]   DATE RECEIVED: 3/1/2022   NOTES STATUS DETAILS   OFFICE NOTE from referring provider Self self-referred   DISCHARGE SUMMARY from hospital     DISCHARGE REPORT from the ER     OPERATIVE REPORT Complete COlonoscopy 12/10/2021   MEDICATION LIST Complete [facility]   CLINICAL TRIAL TREATMENTS TO DATE     LABS     PATHOLOGY REPORTS     ANYTHING RELATED TO DIAGNOSIS Complete Labs last updated on 12/21/2021   GENONOMIC TESTING     TYPE:     IMAGING (NEED IMAGES & REPORT)     CT SCANS     MRI     MAMMO     ULTRASOUND Complete US lower Extremity 9/14/2020 more in PACS   PET       Action    Action Taken 2/24/2022 9:52AM EULOGIO     I called pt Angeli - unavailable.

## 2022-02-25 ENCOUNTER — TELEPHONE (OUTPATIENT)
Dept: UROLOGY | Facility: CLINIC | Age: 72
End: 2022-02-25
Payer: COMMERCIAL

## 2022-02-25 DIAGNOSIS — N32.81 OAB (OVERACTIVE BLADDER): Primary | ICD-10-CM

## 2022-02-25 RX ORDER — SOLIFENACIN SUCCINATE 5 MG/1
5 TABLET, FILM COATED ORAL DAILY
Qty: 90 TABLET | Refills: 1 | Status: SHIPPED | OUTPATIENT
Start: 2022-02-25 | End: 2022-05-26

## 2022-02-25 NOTE — TELEPHONE ENCOUNTER
Urology pt of Dr. Oquendo last seen 7/21/21 for OAB and PFD. Call from Angeli today reporting a change in her insurance coverage of medications. She has been taking Toviaz but insurance stopped covering this. Per Dr. Oquendo's note below, Rx for solifenacin sent to pt's pharmacy. Pt updated.  DOROTHY Gusman RN      February 25, 2022  Jennifer Oquendo MD  to Angeli Jacobson I    9:46 AM  Ms Jacobson     If you wish to switch to a different medication we can switch to the solifenacin 5mg daily.  Please call the office at [phone] to talk to one of the nurses to verify your allergies and pharmacy     Best     YOON Oquendo MD

## 2022-02-28 NOTE — PROGRESS NOTES
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 She is accompanied by her , Chris, today.    Ms. Jacobson reports that since her surgery and XRT for myxoid liposarcoma of left thigh in , she has had LLE lymphedema, extending from left thigh into foot. She has not been previously seen by a physiatrist or a lymphedema specialist. She has mainly been managing it herself, with some help from lymphedema therapists ([employer]) and her PCP in the past. She has not been prescribed any medications for lymphedema, and is not on a diuretic. Over the past 2 years, she has worked with lymphedema therapists about 3 times, with the most recent being everyday for a 3 week period during 2021 (after her most recent knee surgery). During those therapy sessions, they worked on manual lymphatic drainage, and therapists gave Ms. Jacobson some instructions for performing it at home. The therapists also assisted w/ lymphedema wrapping, as well as passive ROM, which she found helpful. At home, she also has a Tactile Flexitouch pump that she was previously using, but has since stopped, since she does not feel that it improves the lymphedema. During the day, Ms. Jacobson wears class III compression stockings on the LLE (Mediven 550, which she replaces about every 6  months), and at night, she does lymphedema wrapping.     She feels that her lymphedema today is at baseline, but does report flairing, especially in the summer months, or after a surgery/procedure. Over the past 2.5 years, she has had a total of 6 surgeries (2 hip, 2 knee, and 2 vascular surgeries), and has a z-plasty and tendon lengthening of left thigh anticipated to be in May. She does endorse numbness in the LLE depending on how she sits, but denies any LLE pain. She has difficulties w/ ROM at left hip and knee, but thinks that it is likely 2/2 scars and swelling. She is independent in all ADL/IADLs, and is ambulating independently w/o assistive device. Due to being the winter months, she has not been able to go out and exercise b/c of the ice, but does have a treadmill at home. She denies any recent falls.      In terms of today's visit, Ms. Jacobson is hoping to establish care with a lymphedema specialist, and to make sure that she is doing the right things to address, and manage her LLE lymphedema.             Past Medical and Surgical History:     Past Medical History:   Diagnosis Date     * * * SBE PROPHYLAXIS * * * 1998    Amox 500mg, take 4 tabs one hour prior to procedure.Takes this because of lymphedema secondary from leg surgey     Central serous retinopathy 2001    Resolved 9/2001     CHRONIC NECK PAIN 1995     Depressive disorder      Depressive disorder, not elsewhere classified 2001     Elevated coronary artery calcium score=7/1/21  8/3/2021     Elevated coronary artery calcium score=7/1/21  8/3/2021     History of blood transfusion 04/2019 12/2020    during left hip pinning, during surgery for patella     Lateral epicondylitis      MIXED HYPERLIPIDEMIA, LDL GOAL <160 1998    LDL goal < 160     Motion sickness      MYXOID LIPOSARCOMA 2000    Left thigh, S/P excision, radiation  at [facility]     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    CHRONIC LEFT THIGH LYMPHEDEMA     Nontoxic multinodular goiter 2005     needs yearly      Osteoporosis, unspecified 2001     Other chronic pain     fx ribs left      Other lymphedema 2000    left thigh, gets regular PT for this     PAD (peripheral artery disease) (H) 4/20/2018     PONV (postoperative nausea and vomiting)      SHINGLES 2001     Sprain of lumbosacral (joint) (ligament) 1995    right     Unspecified hearing loss 1998    chronic tinnitus     Unspecified tinnitus 1998     Past Surgical History:   Procedure Laterality Date     ARTHROPLASTY HIP Left 10/4/2019    Procedure: Removal of left femoral hardware with a conversion to left total hip arthroplasty using a Biomet Annalisa femoral stem with an OsseoTi acetabular shell and a dual mobility bearing surface;  Surgeon: Spencer Celeste MD;  Location: [facility] OR     BIOPSY  April 2000     BYPASS GRAFT FEMOROPOPLITEAL Left 1/21/2019    Procedure: LEFT FEMORAL TO ABOVE KNEE POPLITEAL  BYPASS WITH POLYTETRAFLUOROETHYLENE GRAFT;  Surgeon: Shade Owens MD;  Location:  OR     COLONOSCOPY N/A 2/5/2016    Procedure: COMBINED COLONOSCOPY, SINGLE OR MULTIPLE BIOPSY/POLYPECTOMY BY BIOPSY;  Surgeon: Varun Stanley MD, MD;  Location:  GI     COLONOSCOPY N/A 12/10/2021    Procedure: COLONOSCOPY, WITH POLYPECTOMIES  USING COLD  SNARE,   CLIP APPLIED X2;  Surgeon: Dayton Luna MD;  Location:  GI     CYSTOSCOPY       EXCISION MALIG LESION>1.25CM  5/2000    Myxoid Liposarcoma       EXPLORE GROIN Right 5/1/2018    Procedure: EXPLORE GROIN;  EMERGENCY RIGHT FEMORAL EXPLORATION WITH FEMORAL ARTERY REPAIR.    EBL: 50mL;  Surgeon: Shade Owens MD;  Location:  OR     HC COLP CERVIX/UPPER VAGINA  07/1997    Negative     HC DILATION/CURETTAGE DIAG/THER NON OB  02/1997    Post menopausal bleeding on HRT, negative     HIP SURGERY Left 04/13/2019     IR ANGIOGRAM THROUGH CATHETER FOLLOW UP  12/20/2018     IR ANGIOGRAM THROUGH CATHETER FOLLOW UP  12/21/2018     IR LOWER EXTREMITY ANGIOGRAM LEFT  12/19/2018     OPEN REDUCTION  INTERNAL FIXATION PATELLA Left 2020    Procedure: Left partial patella fracture excision with advancement of the quadriceps tendon;  Surgeon: Stephen Rhodes MD;  Location: [facility] OR     OPEN REDUCTION INTERNAL FIXATION RODDING INTRAMEDULLARY TIBIA Left 2020    Procedure: Open reduction intramedullary nailing of left tibia fracture;  Surgeon: Stephen Rhodes MD;  Location: [facility] OR     REPAIR TENDON QUADRICEPS Left 2021    Procedure: Left knee quadricepsplasty with intraoperative patellar fracture requiring open reduction and internal fixation of the patella;  Surgeon: Spencer Celeste MD;  Location: [facility] OR     VASCULAR SURGERY  2018    Left SFA stent in bypass graft     ZZC APPENDECTOMY      Appendectomy     ZZC NONSPECIFIC PROCEDURE  2000    Open Biopsy Left Thigh Liposarcoma     ZZHC COLONOSCOPY THRU STOMA, DIAGNOSTIC      due             Social History:     Social History     Tobacco Use     Smoking status: Never Smoker     Smokeless tobacco: Never Used   Substance Use Topics     Alcohol use: No       Marital Status:   Living situation: Lives in a 2 story home w/ stairs  Family support: Very supportive   Vocational History: Retired  Tobacco use: Denies  Alcohol use: Denies  Recreational drug use: Denies         Functional history:     Angeli Jacobson is independent in all ADL/IADLs, and ambulates w/o use of assistive device.           Family History:     Family History   Problem Relation Age of Onset     C.A.D. Father         MI 57     Alcohol/Drug Father         etoh     Coronary Artery Disease Father      Obesity Mother      Osteoporosis Mother      Colon Cancer Brother 70     Hyperlipidemia Son      Anxiety Disorder Son      Hyperlipidemia Son         very high, experimental drug     C.A.D. Paternal Grandmother         ascvd     Diabetes Maternal Grandmother      Cancer Maternal Grandmother      C.A.D. Paternal Uncle         Mi  age 48      Cancer Maternal Aunt         pancreatic CA     Hyperlipidemia Son      Hyperlipidemia Cousin             Medications:     Current Outpatient Medications   Medication Sig Dispense Refill     acetaminophen (TYLENOL) 325 MG tablet Take 2 tablets (650 mg) by mouth every 4 hours as needed for other (mild pain) 100 tablet 0     alendronate (FOSAMAX) 70 MG tablet Take 70 mg by mouth every 7 days       aspirin (ASA) 81 MG chewable tablet Take 81 mg by mouth daily       calcium carbonate 600 mg-vitamin D 400 units (CALTRATE) 600-400 MG-UNIT per tablet Take 1 chew tab by mouth daily       clopidogrel (PLAVIX) 75 MG tablet Take 1 tablet (75 mg) by mouth daily 90 tablet 3     econazole nitrate 1 % external cream Apply topically daily 85 g 3     evolocumab (REPATHA) 140 MG/ML prefilled autoinjector Inject 1 mL (140 mg) Subcutaneous every 14 days 6 mL 3     ezetimibe (ZETIA) 10 MG tablet Take 1 tablet (10 mg) by mouth daily 90 tablet 3     levothyroxine (SYNTHROID/LEVOTHROID) 75 MCG tablet Take 1 tablet (75 mcg) by mouth daily 30 tablet 11     multivitamin, therapeutic (THERA-VIT) TABS tablet Take 1 tablet by mouth daily       nitroFURantoin macrocrystal-monohydrate (MACROBID) 100 MG capsule TAKE 1 CAPSULE BY MOUTH AFTER INTRCOURSE 30 capsule 3     order for DME Equipment being ordered: Left Thigh High Compression Stocking, 550 CCL.3 1 each 99     ORDER FOR DME Equipment being ordered: lymphedema bandages 2 Device 1     rosuvastatin (CRESTOR) 20 MG tablet Take 1 tablet (20 mg) by mouth daily 90 tablet 3     solifenacin (VESICARE) 5 MG tablet Take 1 tablet (5 mg) by mouth daily 90 tablet 1     Vitamin D3 (CHOLECALCIFEROL) 25 mcg (1000 units) tablet Take 2 tablets (50 mcg) by mouth daily 60 tablet 0            Allergies:     Allergies   Allergen Reactions     Celexa [Citalopram Hydrobromide]      Decreased libido              ROS:     A focused ROS is negative other than the symptoms noted above in the HPI.      Constitutional:  "denies any fevers, chills, any recent weight loss   Eyes: denies changes in visual acuity   Ears, Nose, Throat: denies any difficulty swallowing   Cardiovascular: denies any exertional chest pain or palpitation   Respiratory: denies dyspnea   Gastrointestinal: denies any nausea, vomiting, abdominal pain, diarrhea or constipation   Genitourinary: denies any dysuria, hematuria, frequency or urgency   Musculoskeletal: denies any muscle pain, joint pain, neck pain or back pain. LLE lymphedema and swelling.  Neurologic: denies any headache, changes in motor or sensory function, no loss of balance or vertigo   Psychiatric: denies mood changes; sleeps OK              Physical Examination:     VITAL SIGNS: /80   Pulse 73   Temp 97.7  F (36.5  C) (Oral)   Resp 16   Wt 67.5 kg (148 lb 12.8 oz)   LMP 03/18/2005   SpO2 99%   BMI 23.66 kg/m    BMI: Estimated body mass index is 23.66 kg/m  as calculated from the following:    Height as of 9/28/21: 1.689 m (5' 6.5\").    Weight as of this encounter: 67.5 kg (148 lb 12.8 oz).    Gen: NAD, pleasant and cooperative   HEENT: MMM  Cardio: regular pulse  Pulm: non-labored breathing in room air  Abd: benign  Ext: Mild lymphedema in LLE extending from thigh to foot. Scar tissue and tightness noted at anterior left thigh. Positive Stemmer sign on left.   Neuro/MSK: Limited ROM w/ hip flexion, short 20 degrees of full flexion, likely 2/2 scar tissue. Limited ROM at knee, able to flex to 20 degrees, able to fully extend.  5/5 strength in b/l UE and LEs.              Laboratory/Imaging:     Bone Densitometry 12/14/2021    IMPRESSION  Severe osteoporosis on the basis of hip fracture. Degenerative changes at the lumbar spine may falsely elevate results.      There has been no significant change in bone density of the lumbar spine. There has been a trend toward  significant decrease in bone density of the hip(s) however differences in rotation make comparison difficult. . There has " been no significant change in bone density of the forearm.      Recommendations include ensuring adequate Calcium and Vitamin D.     Follow up can be considered in 2 years.            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    During today's visit, PM&R team provided Ms. Jacobson and her  education on lymphedema, and lymphedema management. Encouraged her to perform in home exercises, whether on the treadmill, or w/ online videos since it is hard to get outside in the winter months. As well, encouraged her to continue to use the compression stockings every day, and pump 3 times a week. Will plan to have her start lymphedema therapy again, as she would benefit from manual lymphedema drainage, and therapists can educate her how to perform it at home. She is anticipating having orthopedic surgery to LLE in May, so will plan to have her f/u here in clinic sometime in June (after the surgery), or to reach out sooner if she has any questions or concerns.     1. Patient education: In depth discussion and education was provided about the assessment and implications of each of the below recommendations for management. Patient indicated readiness to learn, all questions were answered and understanding of material presented was confirmed.  2. Work-up: None  3. Therapy/equipment/braces: Referral placed for lymphedema therapy in [place]  4. Medications: None  5. Interventions: Continue wearing compression stockings on LLE daily. Use pump 3x a  week, discussed the importance of consistency to help prevent lymphedema flairs.   6. Referral / follow up with other providers: None  7. Follow up: RTC w/ Dr. Sharma at the end of June, after anticipated ortho surgery in May.     Ailin Jacques MD  Resident Physician, PGY-2  Physical Medicine & Rehabilitation  [facility]    Physician Attestation   I, Leeann Sharma MD, saw this patient and agree with the findings and plan of care as documented in the note.      Items personally reviewed/procedural attestation: vitals, labs and imaging and agree with the interpretation documented in the note.    Leeann Sharma MD  Physical Medicine & Rehabilitation    I appreciate the opportunity to participate in the care of your patient.     90 minutes spent on the date of the encounter doing chart review, history and exam, documentation and further activities as noted above.

## 2022-03-01 ENCOUNTER — PRE VISIT (OUTPATIENT)
Dept: ONCOLOGY | Facility: CLINIC | Age: 72
End: 2022-03-01

## 2022-03-01 ENCOUNTER — ONCOLOGY VISIT (OUTPATIENT)
Dept: ONCOLOGY | Facility: CLINIC | Age: 72
End: 2022-03-01
Attending: STUDENT IN AN ORGANIZED HEALTH CARE EDUCATION/TRAINING PROGRAM
Payer: COMMERCIAL

## 2022-03-01 VITALS
HEART RATE: 73 BPM | WEIGHT: 148.8 LBS | RESPIRATION RATE: 16 BRPM | BODY MASS INDEX: 23.66 KG/M2 | SYSTOLIC BLOOD PRESSURE: 129 MMHG | DIASTOLIC BLOOD PRESSURE: 80 MMHG | OXYGEN SATURATION: 99 % | TEMPERATURE: 97.7 F

## 2022-03-01 DIAGNOSIS — T82.898S OCCLUSION OF LEFT FEMOROPOPLITEAL BYPASS GRAFT, SEQUELA: ICD-10-CM

## 2022-03-01 DIAGNOSIS — Z96.649 STATUS POST TOTAL REPLACEMENT OF HIP, UNSPECIFIED LATERALITY: ICD-10-CM

## 2022-03-01 DIAGNOSIS — M25.552 HIP PAIN, LEFT: ICD-10-CM

## 2022-03-01 DIAGNOSIS — I89.0 LYMPHEDEMA OF LEFT LEG: Primary | ICD-10-CM

## 2022-03-01 PROCEDURE — 99417 PROLNG OP E/M EACH 15 MIN: CPT | Mod: GC | Performed by: STUDENT IN AN ORGANIZED HEALTH CARE EDUCATION/TRAINING PROGRAM

## 2022-03-01 PROCEDURE — 99205 OFFICE O/P NEW HI 60 MIN: CPT | Mod: GC | Performed by: STUDENT IN AN ORGANIZED HEALTH CARE EDUCATION/TRAINING PROGRAM

## 2022-03-01 PROCEDURE — G0463 HOSPITAL OUTPT CLINIC VISIT: HCPCS

## 2022-03-01 ASSESSMENT — PAIN SCALES - GENERAL: PAINLEVEL: NO PAIN (0)

## 2022-03-01 NOTE — PROGRESS NOTES
"Oncology Rooming Note    March 1, 2022 9:11 AM   Angeli Jacobson is a 71 year old female who presents for:    Chief Complaint   Patient presents with     Oncology Clinic Visit     Initial Vitals: Resp 16   Wt 67.5 kg (148 lb 12.8 oz)   LMP 03/18/2005   BMI 23.66 kg/m   Estimated body mass index is 23.66 kg/m  as calculated from the following:    Height as of 9/28/21: 1.689 m (5' 6.5\").    Weight as of this encounter: 67.5 kg (148 lb 12.8 oz). Body surface area is 1.78 meters squared.  No Pain (0) Comment: Data Unavailable   Patient's last menstrual period was 03/18/2005.  Allergies reviewed: Yes  Medications reviewed: Yes    Medications: Medication refills not needed today.  Pharmacy name entered into [employer]:    [id number] - [place], [place] - [address] [facility] - [place], [place] - [address]    Clinical concerns: no      Shae Mitchell            "

## 2022-03-01 NOTE — LETTER
3/1/2022         RE: Angeli Jacobson  [id number]  [facility] [id number]        Dear Colleague,    Thank you for referring your patient, Angeli Jacobson, to the [facility] [place]. Please see a copy of my visit note below.           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 She is accompanied by her , Chris, today.    Ms. Jacobson reports that since her surgery and XRT for myxoid liposarcoma of left thigh in , she has had LLE lymphedema, extending from left thigh into foot. She has not been previously seen by a physiatrist or a lymphedema specialist. She has mainly been managing it herself, with some help from lymphedema therapists ([facility]) and her PCP in the past. She has not been prescribed any medications for lymphedema, and is not on a diuretic. Over the past 2 years, she has worked with lymphedema therapists about 3 times, with the most recent being everyday for a 3 week period during Fall  (after her most recent knee surgery). During those therapy sessions, they worked on manual lymphatic drainage, and therapists gave Ms. Jacobson some instructions for performing it at home. The therapists also assisted w/ lymphedema wrapping, as well as passive ROM, which she found helpful. At home, she  also has a Tactile Flexitouch pump that she was previously using, but has since stopped, since she does not feel that it improves the lymphedema. During the day, Ms. Jacobson wears class III compression stockings on the LLE (Mediven 550, which she replaces about every 6 months), and at night, she does lymphedema wrapping.     She feels that her lymphedema today is at baseline, but does report flairing, especially in the summer months, or after a surgery/procedure. Over the past 2.5 years, she has had a total of 6 surgeries (2 hip, 2 knee, and 2 vascular surgeries), and has a z-plasty and tendon lengthening of left thigh anticipated to be in May. She does endorse numbness in the LLE depending on how she sits, but denies any LLE pain. She has difficulties w/ ROM at left hip and knee, but thinks that it is likely 2/2 scars and swelling. She is independent in all ADL/IADLs, and is ambulating independently w/o assistive device. Due to being the winter months, she has not been able to go out and exercise b/c of the ice, but does have a treadmill at home. She denies any recent falls.      In terms of today's visit, Ms. Jacobson is hoping to establish care with a lymphedema specialist, and to make sure that she is doing the right things to address, and manage her LLE lymphedema.             Past Medical and Surgical History:     Past Medical History:   Diagnosis Date     * * * SBE PROPHYLAXIS * * * 1998    Amox 500mg, take 4 tabs one hour prior to procedure.Takes this because of lymphedema secondary from leg surgey     Central serous retinopathy 2001    Resolved 9/2001     CHRONIC NECK PAIN 1995     Depressive disorder      Depressive disorder, not elsewhere classified 2001     Elevated coronary artery calcium score=7/1/21  8/3/2021     Elevated coronary artery calcium score=7/1/21  8/3/2021     History of blood transfusion 04/2019 12/2020    during left hip pinning, during surgery for patella     Lateral epicondylitis       MIXED HYPERLIPIDEMIA, LDL GOAL <160 1998    LDL goal < 160     Motion sickness      MYXOID LIPOSARCOMA 2000    Left thigh, S/P excision, radiation  at [facility]     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    CHRONIC LEFT THIGH LYMPHEDEMA     Nontoxic multinodular goiter 2005    needs yearly US     Osteoporosis, unspecified 2001     Other chronic pain     fx ribs left      Other lymphedema 2000    left thigh, gets regular PT for this     PAD (peripheral artery disease) (H) 4/20/2018     PONV (postoperative nausea and vomiting)      SHINGLES 2001     Sprain of lumbosacral (joint) (ligament) 1995    right     Unspecified hearing loss 1998    chronic tinnitus     Unspecified tinnitus 1998     Past Surgical History:   Procedure Laterality Date     ARTHROPLASTY HIP Left 10/4/2019    Procedure: Removal of left femoral hardware with a conversion to left total hip arthroplasty using a Biomet Annalisa femoral stem with an OsseoTi acetabular shell and a dual mobility bearing surface;  Surgeon: Spencer Celeste MD;  Location: [facility] OR     BIOPSY  April 2000     BYPASS GRAFT FEMOROPOPLITEAL Left 1/21/2019    Procedure: LEFT FEMORAL TO ABOVE KNEE POPLITEAL  BYPASS WITH POLYTETRAFLUOROETHYLENE GRAFT;  Surgeon: Shade Owens MD;  Location:  OR     COLONOSCOPY N/A 2/5/2016    Procedure: COMBINED COLONOSCOPY, SINGLE OR MULTIPLE BIOPSY/POLYPECTOMY BY BIOPSY;  Surgeon: Varun Stanley MD, MD;  Location:  GI     COLONOSCOPY N/A 12/10/2021    Procedure: COLONOSCOPY, WITH POLYPECTOMIES  USING COLD  SNARE,   CLIP APPLIED X2;  Surgeon: Dayton Luna MD;  Location:  GI     CYSTOSCOPY       EXCISION MALIG LESION>1.25CM  5/2000    Myxoid Liposarcoma       EXPLORE GROIN Right 5/1/2018    Procedure: EXPLORE GROIN;  EMERGENCY RIGHT FEMORAL EXPLORATION WITH FEMORAL ARTERY REPAIR.    EBL: 50mL;  Surgeon: Sahde Owens MD;  Location:  OR     HC COLP CERVIX/UPPER VAGINA  07/1997    Negative     HC DILATION/CURETTAGE DIAG/THER NON  OB  02/1997    Post menopausal bleeding on HRT, negative     HIP SURGERY Left 04/13/2019     IR ANGIOGRAM THROUGH CATHETER FOLLOW UP  12/20/2018     IR ANGIOGRAM THROUGH CATHETER FOLLOW UP  12/21/2018     IR LOWER EXTREMITY ANGIOGRAM LEFT  12/19/2018     OPEN REDUCTION INTERNAL FIXATION PATELLA Left 12/21/2020    Procedure: Left partial patella fracture excision with advancement of the quadriceps tendon;  Surgeon: Stephen Rhodes MD;  Location: [facility] OR     OPEN REDUCTION INTERNAL FIXATION RODDING INTRAMEDULLARY TIBIA Left 12/21/2020    Procedure: Open reduction intramedullary nailing of left tibia fracture;  Surgeon: Stephen Rhodes MD;  Location: [facility] OR     REPAIR TENDON QUADRICEPS Left 7/28/2021    Procedure: Left knee quadricepsplasty with intraoperative patellar fracture requiring open reduction and internal fixation of the patella;  Surgeon: Spencer Celeste MD;  Location:  OR     VASCULAR SURGERY  04/30/2018    Left SFA stent in bypass graft     ZZC APPENDECTOMY      Appendectomy     ZZC NONSPECIFIC PROCEDURE  04/2000    Open Biopsy Left Thigh Liposarcoma     ZZHC COLONOSCOPY THRU STOMA, DIAGNOSTIC  2006    due 2010            Social History:     Social History     Tobacco Use     Smoking status: Never Smoker     Smokeless tobacco: Never Used   Substance Use Topics     Alcohol use: No       Marital Status:   Living situation: Lives in a 2 story home w/ stairs  Family support: Very supportive   Vocational History: Retired  Tobacco use: Denies  Alcohol use: Denies  Recreational drug use: Denies         Functional history:     Angeli Jacobson is independent in all ADL/IADLs, and ambulates w/o use of assistive device.           Family History:     Family History   Problem Relation Age of Onset     C.A.D. Father         MI 57     Alcohol/Drug Father         etoh     Coronary Artery Disease Father      Obesity Mother      Osteoporosis Mother      Colon Cancer Brother 70      Hyperlipidemia Son      Anxiety Disorder Son      Hyperlipidemia Son         very high, experimental drug     OXANA Paternal Grandmother         ascvd     Diabetes Maternal Grandmother      Cancer Maternal Grandmother      OXANA Paternal Uncle         Mi  age 48     Cancer Maternal Aunt         pancreatic CA     Hyperlipidemia Son      Hyperlipidemia Cousin             Medications:     Current Outpatient Medications   Medication Sig Dispense Refill     acetaminophen (TYLENOL) 325 MG tablet Take 2 tablets (650 mg) by mouth every 4 hours as needed for other (mild pain) 100 tablet 0     alendronate (FOSAMAX) 70 MG tablet Take 70 mg by mouth every 7 days       aspirin (ASA) 81 MG chewable tablet Take 81 mg by mouth daily       calcium carbonate 600 mg-vitamin D 400 units (CALTRATE) 600-400 MG-UNIT per tablet Take 1 chew tab by mouth daily       clopidogrel (PLAVIX) 75 MG tablet Take 1 tablet (75 mg) by mouth daily 90 tablet 3     econazole nitrate 1 % external cream Apply topically daily 85 g 3     evolocumab (REPATHA) 140 MG/ML prefilled autoinjector Inject 1 mL (140 mg) Subcutaneous every 14 days 6 mL 3     ezetimibe (ZETIA) 10 MG tablet Take 1 tablet (10 mg) by mouth daily 90 tablet 3     levothyroxine (SYNTHROID/LEVOTHROID) 75 MCG tablet Take 1 tablet (75 mcg) by mouth daily 30 tablet 11     multivitamin, therapeutic (THERA-VIT) TABS tablet Take 1 tablet by mouth daily       nitroFURantoin macrocrystal-monohydrate (MACROBID) 100 MG capsule TAKE 1 CAPSULE BY MOUTH AFTER INTRCOURSE 30 capsule 3     order for DME Equipment being ordered: Left Thigh High Compression Stocking, 550 CCL.3 1 each 99     ORDER FOR DME Equipment being ordered: lymphedema bandages 2 Device 1     rosuvastatin (CRESTOR) 20 MG tablet Take 1 tablet (20 mg) by mouth daily 90 tablet 3     solifenacin (VESICARE) 5 MG tablet Take 1 tablet (5 mg) by mouth daily 90 tablet 1     Vitamin D3 (CHOLECALCIFEROL) 25 mcg (1000 units) tablet Take 2  "tablets (50 mcg) by mouth daily 60 tablet 0            Allergies:     Allergies   Allergen Reactions     Celexa [Citalopram Hydrobromide]      Decreased libido              ROS:     A focused ROS is negative other than the symptoms noted above in the HPI.      Constitutional: denies any fevers, chills, any recent weight loss   Eyes: denies changes in visual acuity   Ears, Nose, Throat: denies any difficulty swallowing   Cardiovascular: denies any exertional chest pain or palpitation   Respiratory: denies dyspnea   Gastrointestinal: denies any nausea, vomiting, abdominal pain, diarrhea or constipation   Genitourinary: denies any dysuria, hematuria, frequency or urgency   Musculoskeletal: denies any muscle pain, joint pain, neck pain or back pain. LLE lymphedema and swelling.  Neurologic: denies any headache, changes in motor or sensory function, no loss of balance or vertigo   Psychiatric: denies mood changes; sleeps OK              Physical Examination:     VITAL SIGNS: /80   Pulse 73   Temp 97.7  F (36.5  C) (Oral)   Resp 16   Wt 67.5 kg (148 lb 12.8 oz)   LMP 03/18/2005   SpO2 99%   BMI 23.66 kg/m    BMI: Estimated body mass index is 23.66 kg/m  as calculated from the following:    Height as of 9/28/21: 1.689 m (5' 6.5\").    Weight as of this encounter: 67.5 kg (148 lb 12.8 oz).    Gen: NAD, pleasant and cooperative   HEENT: MMM  Cardio: regular pulse  Pulm: non-labored breathing in room air  Abd: benign  Ext: Mild lymphedema in LLE extending from thigh to foot. Scar tissue and tightness noted at anterior left thigh. Positive Stemmer sign on left.   Neuro/MSK: Limited ROM w/ hip flexion, short 20 degrees of full flexion, likely 2/2 scar tissue. Limited ROM at knee, able to flex to 20 degrees, able to fully extend.  5/5 strength in b/l UE and LEs.              Laboratory/Imaging:     Bone Densitometry 12/14/2021    IMPRESSION  Severe osteoporosis on the basis of hip fracture. Degenerative changes at " the lumbar spine may falsely elevate results.      There has been no significant change in bone density of the lumbar spine. There has been a trend toward  significant decrease in bone density of the hip(s) however differences in rotation make comparison difficult. . There has been no significant change in bone density of the forearm.      Recommendations include ensuring adequate Calcium and Vitamin D.     Follow up can be considered in 2 years.            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    During today's visit, PM&R team provided Ms. Jacobson and her  education on lymphedema, and lymphedema management. Encouraged her to perform in home exercises, whether on the treadmill, or w/ online videos since it is hard to get outside in the winter months. As well, encouraged her to continue to use the compression stockings every day, and pump 3 times a week. Will plan to have her start lymphedema therapy again, as she would benefit from manual lymphedema drainage, and therapists can educate her how to perform it at home. She is anticipating having orthopedic surgery to LLE in May, so will plan to have her f/u here in clinic sometime in June (after the surgery), or to reach out sooner if she has any questions or concerns.     1. Patient education: In depth discussion and education was provided about the assessment and implications of each of the below recommendations for management. Patient indicated readiness to learn, all  "questions were answered and understanding of material presented was confirmed.  2. Work-up: None  3. Therapy/equipment/braces: Referral placed for lymphedema therapy in [place]  4. Medications: None  5. Interventions: Continue wearing compression stockings on LLE daily. Use pump 3x a week, discussed the importance of consistency to help prevent lymphedema flairs.   6. Referral / follow up with other providers: None  7. Follow up: RTC w/ Dr. Sharma at the end of June, after anticipated ortho surgery in May.     Ailin Jacques MD  Resident Physician, PGY-2  Physical Medicine & Rehabilitation  [facility]    Physician Attestation   I, Leeann Sharma MD, saw this patient and agree with the findings and plan of care as documented in the note.      Items personally reviewed/procedural attestation: vitals, labs and imaging and agree with the interpretation documented in the note.    Leeann Sharma MD  Physical Medicine & Rehabilitation    I appreciate the opportunity to participate in the care of your patient.     90 minutes spent on the date of the encounter doing chart review, history and exam, documentation and further activities as noted above.      Oncology Rooming Note    March 1, 2022 9:11 AM   Angeli Jacobson is a 71 year old female who presents for:    Chief Complaint   Patient presents with     Oncology Clinic Visit     Initial Vitals: Resp 16   Wt 67.5 kg (148 lb 12.8 oz)   LMP 03/18/2005   BMI 23.66 kg/m   Estimated body mass index is 23.66 kg/m  as calculated from the following:    Height as of 9/28/21: 1.689 m (5' 6.5\").    Weight as of this encounter: 67.5 kg (148 lb 12.8 oz). Body surface area is 1.78 meters squared.  No Pain (0) Comment: Data Unavailable   Patient's last menstrual period was 03/18/2005.  Allergies reviewed: Yes  Medications reviewed: Yes    Medications: Medication refills not needed today.  Pharmacy name entered into [employer]:    [id number] [place], [place] - [address] [facility]" [facility] - [place], [place] - [address]    Clinical concerns: no      Shae Mitchell                Again, thank you for allowing me to participate in the care of your patient.        Sincerely,        Leeann Sharma MD

## 2022-03-02 ENCOUNTER — LAB (OUTPATIENT)
Dept: LAB | Facility: CLINIC | Age: 72
End: 2022-03-02
Payer: COMMERCIAL

## 2022-03-02 DIAGNOSIS — E78.5 HYPERLIPIDEMIA LDL GOAL <70: ICD-10-CM

## 2022-03-02 DIAGNOSIS — Z95.828 HISTORY OF ARTERIAL BYPASS OF LOWER EXTREMITY: ICD-10-CM

## 2022-03-02 DIAGNOSIS — E03.9 HYPOTHYROIDISM, UNSPECIFIED TYPE: ICD-10-CM

## 2022-03-02 LAB
ALBUMIN SERPL-MCNC: 3.5 G/DL (ref 3.4–5)
ALP SERPL-CCNC: 87 U/L (ref 40–150)
ALT SERPL W P-5'-P-CCNC: 25 U/L (ref 0–50)
ANION GAP SERPL CALCULATED.3IONS-SCNC: 5 MMOL/L (ref 3–14)
AST SERPL W P-5'-P-CCNC: 18 U/L (ref 0–45)
BILIRUB SERPL-MCNC: 0.3 MG/DL (ref 0.2–1.3)
BUN SERPL-MCNC: 15 MG/DL (ref 7–30)
CALCIUM SERPL-MCNC: 9.2 MG/DL (ref 8.5–10.1)
CHLORIDE BLD-SCNC: 108 MMOL/L (ref 94–109)
CHOLEST SERPL-MCNC: 130 MG/DL
CO2 SERPL-SCNC: 27 MMOL/L (ref 20–32)
CREAT SERPL-MCNC: 0.68 MG/DL (ref 0.52–1.04)
FASTING STATUS PATIENT QL REPORTED: YES
GFR SERPL CREATININE-BSD FRML MDRD: >90 ML/MIN/1.73M2
GLUCOSE BLD-MCNC: 84 MG/DL (ref 70–99)
HDLC SERPL-MCNC: 54 MG/DL
LDLC SERPL CALC-MCNC: 55 MG/DL
NONHDLC SERPL-MCNC: 76 MG/DL
POTASSIUM BLD-SCNC: 4.5 MMOL/L (ref 3.4–5.3)
PROT SERPL-MCNC: 7.3 G/DL (ref 6.8–8.8)
SODIUM SERPL-SCNC: 140 MMOL/L (ref 133–144)
TRIGL SERPL-MCNC: 105 MG/DL
TSH SERPL DL<=0.005 MIU/L-ACNC: 0.59 MU/L (ref 0.4–4)

## 2022-03-02 PROCEDURE — 84443 ASSAY THYROID STIM HORMONE: CPT

## 2022-03-02 PROCEDURE — 80053 COMPREHEN METABOLIC PANEL: CPT

## 2022-03-02 PROCEDURE — 36415 COLL VENOUS BLD VENIPUNCTURE: CPT

## 2022-03-02 PROCEDURE — 80061 LIPID PANEL: CPT

## 2022-03-02 NOTE — PATIENT INSTRUCTIONS
1.  A referral was placed today for lymphedema therapy in our [place] clinic as you would benefit from ongoing therapy techniques to help reduce your left leg swelling.  2.  Continue wearing your compression stockings on your left leg daily whenever up and active during the day.  Can be removed at night for rest.  As we discussed, you could continue alternating nights of wrapping after a 2 to 3-day trial of no wrapping overnight to see if you notice a difference.  Our therapist can give you further recommendations on nighttime compression if needed.  3.  Continue using your lymphedema pump 3 times a week.  4.  Return to clinic with Dr. Sharma at the end of June.

## 2022-03-04 DIAGNOSIS — Z95.828 HISTORY OF ARTERIAL BYPASS OF LOWER EXTREMITY: ICD-10-CM

## 2022-03-04 RX ORDER — CLOPIDOGREL BISULFATE 75 MG/1
TABLET ORAL
Qty: 90 TABLET | Refills: 3 | Status: SHIPPED | OUTPATIENT
Start: 2022-03-04 | End: 2023-04-04

## 2022-03-04 NOTE — TELEPHONE ENCOUNTER
"Requested Prescriptions   Pending Prescriptions Disp Refills     clopidogrel (PLAVIX) 75 MG tablet [Pharmacy Med Name: CLOPIDOGREL 75 MG TABLET] 90 tablet 3     Sig: TAKE 1 TABLET BY MOUTH EVERY DAY       Plavix Passed - 3/4/2022 12:34 AM        Passed - No active PPI on record unless is Protonix        Passed - Normal HGB on file in past 12 months     Recent Labs   Lab Test 12/21/21  0939   HGB 13.8               Passed - Normal Platelets on file in past 12 months     Recent Labs   Lab Test 12/21/21  0939                  Passed - Recent (12 mo) or future (30 days) visit within the authorizing provider's specialty     Patient has had an office visit with the authorizing provider or a provider within the authorizing providers department within the previous 12 mos or has a future within next 30 days. See \"Patient Info\" tab in inbasket, or \"Choose Columns\" in Meds & Orders section of the refill encounter.              Passed - Medication is active on med list        Passed - Patient is age 18 or older        Passed - No active pregnancy on record        Passed - No positive pregnancy test in past 12 months           Prescription approved per [facility] Refill Protocol.  Nicol Buenrostro, CHERISEN, RN-[facility]    "

## 2022-03-11 ENCOUNTER — OFFICE VISIT (OUTPATIENT)
Dept: OTHER | Facility: CLINIC | Age: 72
End: 2022-03-11
Attending: INTERNAL MEDICINE
Payer: COMMERCIAL

## 2022-03-11 VITALS
HEIGHT: 66 IN | WEIGHT: 151.8 LBS | BODY MASS INDEX: 24.4 KG/M2 | HEART RATE: 79 BPM | DIASTOLIC BLOOD PRESSURE: 82 MMHG | SYSTOLIC BLOOD PRESSURE: 147 MMHG | OXYGEN SATURATION: 97 %

## 2022-03-11 DIAGNOSIS — C49.9 MYXOID LIPOSARCOMA (H): ICD-10-CM

## 2022-03-11 DIAGNOSIS — I73.9 PAD (PERIPHERAL ARTERY DISEASE) (H): ICD-10-CM

## 2022-03-11 DIAGNOSIS — E78.5 HYPERLIPIDEMIA LDL GOAL <70: ICD-10-CM

## 2022-03-11 DIAGNOSIS — Z95.828 HISTORY OF ARTERIAL BYPASS OF LOWER EXTREMITY: ICD-10-CM

## 2022-03-11 DIAGNOSIS — R93.1 AGATSTON CORONARY ARTERY CALCIUM SCORE GREATER THAN 400: ICD-10-CM

## 2022-03-11 DIAGNOSIS — I89.0 LYMPHEDEMA OF LEFT LEG: Primary | ICD-10-CM

## 2022-03-11 DIAGNOSIS — E03.9 HYPOTHYROIDISM, UNSPECIFIED TYPE: ICD-10-CM

## 2022-03-11 PROCEDURE — 99215 OFFICE O/P EST HI 40 MIN: CPT | Performed by: INTERNAL MEDICINE

## 2022-03-11 PROCEDURE — G0463 HOSPITAL OUTPT CLINIC VISIT: HCPCS

## 2022-03-11 RX ORDER — EZETIMIBE 10 MG/1
10 TABLET ORAL DAILY
Qty: 90 TABLET | Refills: 3 | Status: SHIPPED | OUTPATIENT
Start: 2022-03-11 | End: 2023-04-04

## 2022-03-11 RX ORDER — LEVOTHYROXINE SODIUM 75 UG/1
75 TABLET ORAL DAILY
Qty: 90 TABLET | Refills: 3 | Status: SHIPPED | OUTPATIENT
Start: 2022-03-11 | End: 2023-01-10

## 2022-03-11 ASSESSMENT — PAIN SCALES - GENERAL: PAINLEVEL: NO PAIN (0)

## 2022-03-11 NOTE — PROGRESS NOTES
SUBJECTIVE:  CC:  Follow-up visit  Multiple issues   Review of recent labs  Med refills   Tolerating crestor, zetia and PCSK 9 inhibitor  Started lymphedema therapy   Planning for left leg surgery in may 2022  History of left lower extremity arterial bypass for PAD  She broke her left hip in April 2019 while in [place] underwent ORIF followed by left total hip replacement on October 4, 2019 at [facility]       History of present illness:   Angeli Jacobson is a 71 year old very pleasant female with past medical history of left thigh/groin sarcoma underwent surgery with lymphadenectomy and radiation therapy in the year 2000 at [facility] followed by she developed peripheral arterial disease underwent  Redo left common femoral to a bony popliteal artery bypass with 6 mm PTFE graft in January 2019 and since then reasonably doing well as for as the PAD concerned visited [place] in April and she fell down broke her left hip underwent ORIF over there.   She has a long-standing history of lymphedema after sarcoma surgery and radiation therapy.  Using compression stockings etc..  She was also evaluated extensively at lymphedema clinic and currently using pump  On October 4, 2019 she underwent left total hip replacement .  Postoperatively she was having back spasms due to positional discomfort and treated with Flexeril and they are better now.  She has been taking both Plavix and aspirin.    November 2019 and in 2021  left lower extremity arterial duplex and ADRIAN Dopplers were good  She is able to walk approximately 10 blocks daily  Last week she underwent fasting lipids excellent response with combination of statin , zetia and PCSK 9    pervious Arterial duplex patent graft  Reviewed recent imaging studies, laboratory data in the epic  She started going for lymphedema therapy  Wearing compression stockings  HISTORIES:  PROBLEM LIST:   Patient Active Problem List   Diagnosis      MULTINODUL GOITER     Hearing loss     Hyperlipidemia LDL goal <70     Osteoporosis     Impaired fasting glucose     Lymphedema of left leg     Tubular adenoma     Chronic pain of left knee     Vertigo     Myxoid liposarcoma (HCC)     PAD (peripheral artery disease) (H)     Vascular graft occlusion (H)     Femoral-popliteal bypass graft occlusion, left (H)     History of sarcoma     S/P ORIF (open reduction internal fixation) fracture     Hip pain, left     Postural urinary incontinence     Personal history of urinary tract infection     OAB (overactive bladder)     Myalgia of pelvic floor     Lesion of bladder     S/P total hip arthroplasty     Acute pain of left knee     Closed fracture of left tibia and fibula, initial encounter     Closed displaced fracture of left patella, unspecified fracture morphology, initial encounter     Other specified injury of left quadriceps muscle, fascia and tendon, initial encounter     Elevated coronary artery calcium score=7/1/21      PAST MEDICAL HISTORY:  Past Medical History:   Diagnosis Date     * * * SBE PROPHYLAXIS * * * 1998    Amox 500mg, take 4 tabs one hour prior to procedure.Takes this because of lymphedema secondary from leg surgey     Central serous retinopathy 2001    Resolved 9/2001     CHRONIC NECK PAIN 1995     Depressive disorder      Depressive disorder, not elsewhere classified 2001     Elevated coronary artery calcium score=7/1/21  8/3/2021     Elevated coronary artery calcium score=7/1/21  8/3/2021     History of blood transfusion 04/2019 12/2020    during left hip pinning, during surgery for patella     Lateral epicondylitis      MIXED HYPERLIPIDEMIA, LDL GOAL <160 1998    LDL goal < 160     Motion sickness      MYXOID LIPOSARCOMA 2000    Left thigh, S/P excision, radiation  [facility]     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    CHRONIC LEFT THIGH LYMPHEDEMA     Nontoxic multinodular goiter 2005    needs yearly US     Osteoporosis, unspecified 2001     Other  chronic pain     fx ribs left      Other lymphedema 2000    left thigh, gets regular PT for this     PAD (peripheral artery disease) (H) 4/20/2018     PONV (postoperative nausea and vomiting)      SHINGLES 2001     Sprain of lumbosacral (joint) (ligament) 1995    right     Unspecified hearing loss 1998    chronic tinnitus     Unspecified tinnitus 1998     PAST SURGICAL HISTORY:  Past Surgical History:   Procedure Laterality Date     ARTHROPLASTY HIP Left 10/4/2019    Procedure: Removal of left femoral hardware with a conversion to left total hip arthroplasty using a Biomet Annalisa femoral stem with an OsseoTi acetabular shell and a dual mobility bearing surface;  Surgeon: Spencer Celeste MD;  Location: [facility] OR     BIOPSY  April 2000     BYPASS GRAFT FEMOROPOPLITEAL Left 1/21/2019    Procedure: LEFT FEMORAL TO ABOVE KNEE POPLITEAL  BYPASS WITH POLYTETRAFLUOROETHYLENE GRAFT;  Surgeon: Shade Owens MD;  Location:  OR     COLONOSCOPY N/A 2/5/2016    Procedure: COMBINED COLONOSCOPY, SINGLE OR MULTIPLE BIOPSY/POLYPECTOMY BY BIOPSY;  Surgeon: Varun Stanley MD, MD;  Location:  GI     COLONOSCOPY N/A 12/10/2021    Procedure: COLONOSCOPY, WITH POLYPECTOMIES  USING COLD  SNARE,   CLIP APPLIED X2;  Surgeon: Dayton Luna MD;  Location:  GI     CYSTOSCOPY       EXCISION MALIG LESION>1.25CM  5/2000    Myxoid Liposarcoma       EXPLORE GROIN Right 5/1/2018    Procedure: EXPLORE GROIN;  EMERGENCY RIGHT FEMORAL EXPLORATION WITH FEMORAL ARTERY REPAIR.    EBL: 50mL;  Surgeon: Shade Owens MD;  Location:  OR     HC COLP CERVIX/UPPER VAGINA  07/1997    Negative     HC DILATION/CURETTAGE DIAG/THER NON OB  02/1997    Post menopausal bleeding on HRT, negative     HIP SURGERY Left 04/13/2019     IR ANGIOGRAM THROUGH CATHETER FOLLOW UP  12/20/2018     IR ANGIOGRAM THROUGH CATHETER FOLLOW UP  12/21/2018     IR LOWER EXTREMITY ANGIOGRAM LEFT  12/19/2018     OPEN REDUCTION INTERNAL FIXATION PATELLA Left  12/21/2020    Procedure: Left partial patella fracture excision with advancement of the quadriceps tendon;  Surgeon: Stephen Rhodes MD;  Location: [facility] OR     OPEN REDUCTION INTERNAL FIXATION RODDING INTRAMEDULLARY TIBIA Left 12/21/2020    Procedure: Open reduction intramedullary nailing of left tibia fracture;  Surgeon: Stephen Rhodes MD;  Location: [facility] OR     REPAIR TENDON QUADRICEPS Left 7/28/2021    Procedure: Left knee quadricepsplasty with intraoperative patellar fracture requiring open reduction and internal fixation of the patella;  Surgeon: Spencer Celeste MD;  Location:  OR     VASCULAR SURGERY  04/30/2018    Left SFA stent in bypass graft     Z APPENDECTOMY      Appendectomy     ZC NONSPECIFIC PROCEDURE  04/2000    Open Bi[facility] Left Thigh Liposarcoma     [facility]  [facility]    due 2010     CURRENT MEDICATIONS:  Current Outpatient Medications   Medication Sig Dispense Refill     alendronate (FOSAMAX) 70 MG tablet Take 70 mg by mouth every 7 days       aspirin (ASA) 81 MG chewable tablet Take 81 mg by mouth daily       calcium carbonate 600 mg-vitamin D 400 units (CALTRATE) 600-400 MG-UNIT per tablet Take 1 chew tab by mouth daily       clopidogrel (PLAVIX) 75 MG tablet TAKE 1 TABLET BY MOUTH EVERY DAY 90 tablet 3     econazole nitrate 1 % external cream Apply topically daily 85 g 3     evolocumab (REPATHA) 140 MG/ML prefilled autoinjector Inject 1 mL (140 mg) Subcutaneous every 14 days 6 mL 3     ezetimibe (ZETIA) 10 MG tablet Take 1 tablet (10 mg) by mouth daily 90 tablet 3     levothyroxine (SYNTHROID/LEVOTHROID) 75 MCG tablet Take 1 tablet (75 mcg) by mouth daily 90 tablet 3     multivitamin, therapeutic (THERA-VIT) TABS tablet Take 1 tablet by mouth daily       nitroFURantoin macrocrystal-monohydrate (MACROBID) 100 MG capsule TAKE 1 CAPSULE BY MOUTH AFTER INTRCOURSE 30 capsule 3     order for DME Equipment being ordered: Left Thigh High Compression Stocking,  550 CCL.3 1 each 99     ORDER FOR DME Equipment being ordered: lymphedema bandages 2 Device 1     rosuvastatin (CRESTOR) 20 MG tablet Take 1 tablet (20 mg) by mouth daily 90 tablet 3     solifenacin (VESICARE) 5 MG tablet Take 1 tablet (5 mg) by mouth daily 90 tablet 1     Vitamin D3 (CHOLECALCIFEROL) 25 mcg (1000 units) tablet Take 2 tablets (50 mcg) by mouth daily 60 tablet 0     acetaminophen (TYLENOL) 325 MG tablet Take 2 tablets (650 mg) by mouth every 4 hours as needed for other (mild pain) 100 tablet 0     ALLERGIES:  Allergies   Allergen Reactions     Celexa [Citalopram Hydrobromide]      Decreased libido     SOCIAL HISTORY:  Social History     Socioeconomic History     Marital status:      Spouse name: Chris     Number of children: 3     Years of education: 14     Highest education level: Associate degree: academic program   Occupational History     Occupation: at home     Employer: NONE    Tobacco Use     Smoking status: Never Smoker     Smokeless tobacco: Never Used   Vaping Use     Vaping Use: Never used   Substance and Sexual Activity     Alcohol use: No     Drug use: No     Sexual activity: Yes     Partners: Male     Birth control/protection: Post-menopausal     Comment:  had a vasectomy   Other Topics Concern     Parent/sibling w/ CABG, MI or angioplasty before 65F 55M? No   Social History Narrative     Not on file     Social Determinants of Health     Financial Resource Strain: Low Risk      Difficulty of Paying Living Expenses: Not hard at all   Food Insecurity: No Food Insecurity     Worried About Running Out of Food in the Last Year: Never true     Ran Out of Food in the Last Year: Never true   Transportation Needs: No Transportation Needs     Lack of Transportation (Medical): No     Lack of Transportation (Non-Medical): No   Physical Activity: Inactive     Days of Exercise per Week: 0 days     Minutes of Exercise per Session: 0 min   Stress: Stress Concern Present     Feeling of  Stress : To some extent   Social Connections: Moderately Integrated     Frequency of Communication with Friends and Family: Never     Frequency of Social Gatherings with Friends and Family: Never     Attends Jew Services: More than 4 times per year     Active Member of Clubs or Organizations: Yes     Attends Club or Organization Meetings: Not on file     Marital Status:    Intimate Partner Violence: Not on file   Housing Stability: Low Risk      Unable to Pay for Housing in the Last Year: No     Number of Places Lived in the Last Year: 1     Unstable Housing in the Last Year: No     FAMILY HISTORY:  Family History   Problem Relation Age of Onset     C.A.D. Father         MI 57     Alcohol/Drug Father         etoh     Coronary Artery Disease Father      Obesity Mother      Osteoporosis Mother      Colon Cancer Brother 70     Hyperlipidemia Son      Anxiety Disorder Son      Hyperlipidemia Son         very high, experimental drug     C.A.D. Paternal Grandmother         ascvd     Diabetes Maternal Grandmother      Cancer Maternal Grandmother      C.A.D. Paternal Uncle         Mi  age 48     Cancer Maternal Aunt         pancreatic CA     Hyperlipidemia Son      Hyperlipidemia Cousin      REVIEW OF SYSTEMS:  CONSTITUTIONAL:no malaise, fatigue, or other general symptoms  EYES: no subjective changes in visual acuity, no photophobia  ENT/MOUTH: no complaints of rhinorrhea, nasal congestion, sore throat, hearing changes  RESP:no SOB  CV: no c/o exertional chest pressure or SALDANA  GI: No abdominal pain, constipation, change in bowel movements, nausea, pyrosis, BRBPR  :no polyuria or polydipsia, no dysuria, no gross hematuria  MUSCULOSKELATAL:no arthalgias or myalgias  INTEGUMENTARY/SKIN: no pruritis, rashes, or moles with recent change in size, shape, or pigmentation  NEURO: no gross sensory or motor symptoms, no dizziness, no confusion  ENDOCRINE: no polyuria or polydipsia, no heat or cold  "intolerance  HEME/ALLERGY/IMMUNE: no fevers, chills, night sweats, or unwanted weight loss  PSYCHIATRIC: no depression, anxiety, or internal stimuli  EXAM:  BP (!) 147/82 (BP Location: Right arm, Patient Position: Chair, Cuff Size: Adult Regular)   Pulse 79   Ht 5' 6\" (1.676 m)   Wt 151 lb 12.8 oz (68.9 kg)   LMP 03/18/2005   SpO2 97%   BMI 24.50 kg/m    BMI: Body mass index is 24.5 kg/m .  GENERAL APPEARANCE:  Pleasant  Healthy appearing male , alert, active, no distress cooperative.  EXAM:  EYES: clear conjunctiva, no cataracts, no obvious fundoscopic abnormalities  HENT: oropharynx, nares, and TMs are WNL  NECK: no JVD, thyromegaly or lymphadenopathy, no cervical bruits  RESP: clear to auscultation without rales, wheezes, or rhonchi  CV: RRR, no murmurs, gallops, or rubs  LYMPH: no cervical , axillary, or inguinal lymphadenopathy appreciated  GI: NABS, ND/NT, no masses or organomegally appreciated  : normal external genitalia and anus, no lumps, masses  MS: Left lower extremity lymphedema slightly improved compared to before  Palpable peripheral pulses bilaterally symmetrical  SKIN: no nevi clinically suspicious for malignancy are noted  NEURO: CN II-XII intact, no localizing sensory or motor abnoramlities noted, DTRs symmetrical bilaterally  PSYCH: Mental status exam reveals the pt to have normal mood and affect. There is no disorder of thought form or content. There is no response to internal stimuli. There is no suicidal or homicidal ideation.    US ADRIAN DOPPLER WITH EXERCISE BILATERAL  9/14/2020 2:12 PM     CLINICAL HISTORY: Status post redo of a left femoral to AK popliteal  artery PTFE bypass graft. Hx PAD; PAD (peripheral artery disease) (H)     COMPARISON: 12/12/2018.     ADRIAN FINDINGS:      RIGHT(mmHg)  Brachial: 133  Ankle (PT): 146; Index 1.10  Ankle (DP): 163; Index 1.23     LEFT (mmHg)  Brachial: 132  Ankle (PT): 130; Index 0.98  Ankle (DP): 147; Index 1.11     The patient was exercised on a " treadmill at 1.5 mph at a 10% incline  for 5 minutes total. The patient was asymptomatic.     Resting and immediate postexercise ABIs are 1.23 and 1.22 on the  right.     Resting and immediate postexercise ABIs are 1.10 and 1.08 on the left.     WAVEFORMS: The dorsalis pedis and posterior tibial arteries show  normal triphasic vascular waveforms bilaterally.                                                                      IMPRESSION:  1. RIGHT LOWER EXTREMITY: No significant interval change. ADRIAN at rest  is normal with a normal response to exercise. These findings would not  be consistent with symptoms of arterial claudication.     2. LEFT LOWER EXTREMITY: No significant interval change. ADRIAN at rest  is normal with a normal response to exercise. These findings would not  be consistent with symptoms of arterial claudication.     CLAY BRUNNER MD    EXAM: DUPLEX ARTERIAL ULTRASOUND OF THE LEFT LOWER EXTREMITY     INDICATION: Peripheral arterial disease. Revision left femoral to  above-knee synthetic arterial bypass on 1/21/2019.     COMPARISON: None.     TECHNIQUE: Duplex imaging is performed utilizing gray-scale,  two-dimensional images, and color-flow imaging. Doppler waveform  analysis and spectral Doppler imaging is also performed.     DUPLEX ARTERIAL ULTRASOUND FINDINGS:      VELOCITIES (CENTIMETERS PER SECOND)  Inflow: 164  Proximal graft anastomosis: 162  Proximal graft: 86  Mid graft: 75  Distal graft: 82  Distal anastomosis: 60  Native postanastomotic artery: 76                                                                IMPRESSION:  No significant interval change. The left femoral to popliteal arterial  bypass is widely patent with brisk multiphasic vascular waveforms  throughout and no evidence for hemodynamic significant lesion. The  diameter of the graft measures 5.6-6.2 mm.    Reviewed recent labs with patient and her      A/P:  1. Lymphedema of left leg  She was seen and evaluated  recently lymphedema therapist continue the same plan elevate the leg lose weight use compression stockings and pump therapy     2. History of arterial bypass of Left lower extremity  Currently taking dual antiplatelet agent baby aspirin with the Plavix tolerating without any problems and she is tolerating crestor   40 mg daily along with Zetia 10 mg daily and PCSK 9 inhibitor   Excellent lipids  Therapeutic lifestyle modification suggested    She is able to walk 10 blocks continue to walk as much as she can and follow the diet and exercise plan  Arrange ADRIAN with exercise and Arterail duplex in 1-2 weeks       3. Myxoid liposarcoma (H) of left thigh [facility].  Planning for leg surgery in May 2022 , may need to hold plavix 5 days before and continue asa      4. Hyperlipidemia LDL goal <70  Well controlled with statin, PCSK 9 and zetia etc  5. Hypothyroidism, unspecified type  Clinically euthyroid and recent thyroid function tests are normal continue the same and take medication as advised  Refilled meds.  I have discussed with patient the risks, benefits, medications, treatment options and modalities.   I have instructed the patient to call or schedule a follow-up appointment if any problems or failure to improve.    Return to clinic in 6 months [facility] office or virtual visit.  Copy of this dictation to primary care provider.        Tiesha Prince MD, JASON, FSVM, FNLA  Vascular Medicine  Clinical Lipidologist

## 2022-03-11 NOTE — PATIENT INSTRUCTIONS
Recent labs are excellent range    Go for ADRIAN and left leg arterial duplex in 1-2 weeks we will arrange test , we can discuss results on the phone     Refilled meds zetia and levothyroxine    Continue current medications     Continue compression and follow edema therapist recommendations.

## 2022-03-11 NOTE — PROGRESS NOTES
"Patient is here for a follow up  to discuss six month follow up.        BP (!) 147/82 (BP Location: Right arm, Patient Position: Chair, Cuff Size: Adult Regular)   Pulse 79   Ht 5' 6\" (1.676 m)   Wt 151 lb 12.8 oz (68.9 kg)   LMP 03/18/2005   SpO2 97%   BMI 24.50 kg/m      The provider has been notified that the patient has no concerns.     Questions patient would like addressed today are: N/A.    Refills are needed: Yes    Has homecare services and agency name:  Ninfa Gunter  "

## 2022-03-15 ENCOUNTER — THERAPY VISIT (OUTPATIENT)
Dept: PHYSICAL THERAPY | Facility: CLINIC | Age: 72
End: 2022-03-15
Payer: COMMERCIAL

## 2022-03-15 DIAGNOSIS — Z87.81 S/P ORIF (OPEN REDUCTION INTERNAL FIXATION) FRACTURE: ICD-10-CM

## 2022-03-15 DIAGNOSIS — G89.29 CHRONIC PAIN OF LEFT KNEE: Primary | ICD-10-CM

## 2022-03-15 DIAGNOSIS — M25.562 CHRONIC PAIN OF LEFT KNEE: Primary | ICD-10-CM

## 2022-03-15 DIAGNOSIS — Z98.890 S/P ORIF (OPEN REDUCTION INTERNAL FIXATION) FRACTURE: ICD-10-CM

## 2022-03-15 PROCEDURE — 97110 THERAPEUTIC EXERCISES: CPT | Mod: GP | Performed by: PHYSICAL THERAPIST

## 2022-03-15 PROCEDURE — 97161 PT EVAL LOW COMPLEX 20 MIN: CPT | Mod: GP | Performed by: PHYSICAL THERAPIST

## 2022-03-15 ASSESSMENT — ACTIVITIES OF DAILY LIVING (ADL)
STAND: ACTIVITY IS MINIMALLY DIFFICULT
WEAKNESS: NOT ANSWERED
GIVING WAY, BUCKLING OR SHIFTING OF KNEE: I DO NOT HAVE THE SYMPTOM
LIMPING: THE SYMPTOM AFFECTS MY ACTIVITY MODERATELY
WALK: ACTIVITY IS SOMEWHAT DIFFICULT
SWELLING: I DO NOT HAVE THE SYMPTOM
KNEEL ON THE FRONT OF YOUR KNEE: I AM UNABLE TO DO THE ACTIVITY
SIT WITH YOUR KNEE BENT: I AM UNABLE TO DO THE ACTIVITY
KNEE_ACTIVITY_OF_DAILY_LIVING_SCORE: 43.07
RAW_SCORE: 30.15
HOW_WOULD_YOU_RATE_THE_OVERALL_FUNCTION_OF_YOUR_KNEE_DURING_YOUR_USUAL_DAILY_ACTIVITIES?: ABNORMAL
KNEE_ACTIVITY_OF_DAILY_LIVING_SUM: 28
SQUAT: I AM UNABLE TO DO THE ACTIVITY
AS_A_RESULT_OF_YOUR_KNEE_INJURY,_HOW_WOULD_YOU_RATE_YOUR_CURRENT_LEVEL_OF_DAILY_ACTIVITY?: ABNORMAL
PAIN: THE SYMPTOM AFFECTS MY ACTIVITY MODERATELY
GO DOWN STAIRS: ACTIVITY IS FAIRLY DIFFICULT
STIFFNESS: THE SYMPTOM AFFECTS MY ACTIVITY SEVERELY
RISE FROM A CHAIR: ACTIVITY IS FAIRLY DIFFICULT
GO UP STAIRS: ACTIVITY IS FAIRLY DIFFICULT

## 2022-03-15 NOTE — PROGRESS NOTES
[facility]    OUTPATIENT Physical Therapy ORTHOPEDIC EVALUATION  PLAN OF TREATMENT FOR OUTPATIENT REHABILITATION  (COMPLETE FOR INITIAL CLAIMS ONLY)  Patient's Last Name, First Name, M.I.  YOB: 1950  KavonAngeli  PARTH    Provider s Name:  [facility]   Medical Record No.  [id number]   Start of Care Date:  03/15/22   Onset Date:  07/21/2103/04/22 (MD order date)   Type:     _X__PT   ___OT Medical Diagnosis:    Encounter Diagnoses   Name Primary?     Chronic pain of left knee Yes     S/P ORIF (open reduction internal fixation) fracture         Treatment Diagnosis:  L knee pain        Goals:     03/15/22 0500   Body Part   Goals listed below are for L knee pain   Goal #1   Goal #1 squatting/kneeling   Previous Functional Level No restrictions   Current Functional Level Can do a partial squat   Performance Level very limited ROM 30* 2/10 pain   STG Target Performance Do a partial squat   Performance Level 45* with 1/10 pain   Rationale for proper body mechanics while performing housework;for proper body mechanics while performing yardwork and home maintenance;for proper body mechanics when lifting, personal hygiene, dressing   Due date 04/05/22   LTG Target Performance Do a partial squat   Performance Level 60* 1/10 pain   Rationale for proper body mechanics while performing housework;for proper body mechanics while performing yardwork and home maintenance;for proper body mechanics when lifting, personal hygiene, dressing   Due date 05/10/22       Therapy Frequency:  1x/week  Predicted Duration of Therapy Intervention:  8 weeks    Chaka Paulino PT                 I CERTIFY THE NEED FOR THESE SERVICES FURNISHED UNDER        THIS PLAN OF TREATMENT AND WHILE UNDER MY CARE .             Physician Signature                Date    X_____________________________________________________                             Certification Date From:  03/15/22   Certification Date To:  05/13/22    Referring Provider:  Spencer Celeste    Initial Assessment        See Epic Evaluation SOC Date: 03/15/22

## 2022-03-15 NOTE — PROGRESS NOTES
Physical Therapy Initial Evaluation  Subjective:    Patient Health History  Angeli Jacobson being seen for Evaluate muscle strength.     Problem began: 7/21/2022.   Problem occurred: Ongoing femoral conditions due to surgeries.   Pain is reported as 1/10 on pain scale.  General health as reported by patient is good.  Pertinent medical history includes: none.     Medical allergies: none.   Surgeries include:  Orthopedic surgery and cancer surgery.    Current medications:  Bone density, cardiac medication and thyroid medication.    Current occupation is Homemaker.      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                Therapist Generated HPI Evaluation  Problem details: Pt c/o L knee pain and ROM restrictions since 7/28/2021 surgery for quadriceps plasty and pat ORIF (intrasurgical fx).  Had surgery due to ongoing ROM restrictions following 12/21/2020 surgery for tib and pat ORIF.  Pt was scheduled for additional quad plasty but has been delayed to scheduling issues.  Will have additional surgery end of May 2022.  MD order date 3/4/2022.  PMH: L femur ORIF 4/2018 and l GISSELL 10/2019, L L/E lymph edema.  MD order date 8/24/2021..   .         Type of problem:  Left knee.    This is a chronic condition.  Condition occurred with:  A fall/slip.  Where condition occurred: in the community.  Patient reports pain:  Anterior and medial.  Pain is described as aching and is constant.  Pain radiates to:  Thigh. Pain is the same all the time.    Associated symptoms:  Loss of motion/stiffness, loss of strength and edema (chronic lymph edema prior to injury, but increased since injury). Symptoms are exacerbated by ascending stairs, bending/squatting, descending stairs, transfers, standing and walking  and relieved by rest and activity/movement.    Previous treatment includes physical therapy. There was moderate improvement following previous treatment.  Barriers include:  None as reported by  patient.                        Objective:    Gait:    Gait Type:  Antalgic   Assistive Devices:  None  Deviations:  Hip:  Decr dynamic control LKnee:  Knee flexion decr L                                                      Knee Evaluation:  ROM:  Strength wnl knee: Independent SLR no ext lag initially, 5* after 5 reps.  AROM      Extension: Left: 0    Right:   Flexion: Left: 60   Right:  PROM      Extension: Left: 0   Right:   Flexion: Left: 64   Right:       Strength:     Extension:  Left:/5   Pain:+/-      Right:/5  Strong/pain free  Pain:  Flexion:  Left: 4+/5    Pain:+/-      Right:/5  Strong/pain free  Pain:    Quad Set Left:  Fair    Pain: +/-             Mobility Testing:          Patellofemoral Superior:  Left: hypomobile      Patellofemoral Inferior:  Left: hypomobile      Functional Testing:            Proprioception:   Stork Balance Test:  Left:  7-8 sec  Right:  20sec  % of Uninvolved:           General     ROS    Assessment/Plan:    Patient is a 71 year old female with left side knee complaints.    Patient has the following significant findings with corresponding treatment plan.                Diagnosis 1:  L knee pain  Pain -  hot/cold therapy, manual therapy and home program  Decreased ROM/flexibility - manual therapy and therapeutic exercise  Decreased strength - therapeutic exercise and therapeutic activities  Decreased proprioception - neuro re-education and therapeutic activities  Edema - cold therapy, self management/home program and pt is currently in lymph edema therapy  Impaired gait - gait training  Impaired muscle performance - neuro re-education  Decreased function - therapeutic activities    Therapy Evaluation Codes:   Cumulative Therapy Evaluation is: Low complexity.    Previous and current functional limitations:  (See Goal Flow Sheet for this information)    Short term and Long term goals: (See Goal Flow Sheet for this information)     Communication ability:  Patient appears to be  able to clearly communicate and understand verbal and written communication and follow directions correctly.  Treatment Explanation - The following has been discussed with the patient:   RX ordered/plan of care  Anticipated outcomes  Possible risks and side effects  This patient would benefit from PT intervention to resume normal activities.   Rehab potential is good.    Frequency:  1 X week, once daily  Duration:  for 8 weeks  Discharge Plan:  Achieve all LTG.  Independent in home treatment program.  Reach maximal therapeutic benefit.    Please refer to the daily flowsheet for treatment today, total treatment time and time spent performing 1:1 timed codes.

## 2022-03-17 ENCOUNTER — TELEPHONE (OUTPATIENT)
Dept: OTHER | Facility: CLINIC | Age: 72
End: 2022-03-17
Payer: COMMERCIAL

## 2022-03-17 NOTE — TELEPHONE ENCOUNTER
Pt has been scheduled for US imaging in [place].    Future Appointments   Date Time Provider Department Center   3/28/2022  3:00 PM [facility]2 [facility]   3/28/2022  4:00 PM [facility]2 [facility]   3/29/2022 10:40 AM Chaka Paulino, PT [facility]   4/4/2022 12:45 PM Mariana Guzman, PT [facility]   4/13/2022 11:45 AM Mariana Guzman, PT [facility]   5/23/2022 12:45 PM Spencer Wilder MD [facility]

## 2022-03-28 ENCOUNTER — HOSPITAL ENCOUNTER (OUTPATIENT)
Dept: ULTRASOUND IMAGING | Facility: CLINIC | Age: 72
Discharge: HOME | End: 2022-03-28
Attending: INTERNAL MEDICINE | Admitting: INTERNAL MEDICINE
Payer: COMMERCIAL

## 2022-03-28 ENCOUNTER — TELEPHONE (OUTPATIENT)
Dept: OTHER | Facility: CLINIC | Age: 72
End: 2022-03-28
Payer: COMMERCIAL

## 2022-03-28 DIAGNOSIS — I73.9 PAD (PERIPHERAL ARTERY DISEASE) (H): ICD-10-CM

## 2022-03-28 DIAGNOSIS — Z95.828 HISTORY OF ARTERIAL BYPASS OF LOWER EXTREMITY: ICD-10-CM

## 2022-03-28 PROCEDURE — 93924 LWR XTR VASC STDY BILAT: CPT

## 2022-03-28 PROCEDURE — 93926 LOWER EXTREMITY STUDY: CPT | Mod: LT

## 2022-03-28 NOTE — TELEPHONE ENCOUNTER
[facility]    Who is the name of the provider?:  Niki      What is the location you see this provider at?: [place]    Reason for call:  Is the 3 PM US today correctly ordered for bilateral?  In past has only been left leg.     Can we leave a detailed message on this number?  Not Applicable

## 2022-03-28 NOTE — TELEPHONE ENCOUNTER
I called US and stated I changed to the correct order to LLE arterial US instead of bilateral.    Vika ALRA, RN    [facility]  [facility]  Office: [phone]  Fax: [phone]

## 2022-03-29 ENCOUNTER — THERAPY VISIT (OUTPATIENT)
Dept: PHYSICAL THERAPY | Facility: CLINIC | Age: 72
End: 2022-03-29
Payer: COMMERCIAL

## 2022-03-29 DIAGNOSIS — Z87.81 S/P ORIF (OPEN REDUCTION INTERNAL FIXATION) FRACTURE: ICD-10-CM

## 2022-03-29 DIAGNOSIS — M25.562 CHRONIC PAIN OF LEFT KNEE: ICD-10-CM

## 2022-03-29 DIAGNOSIS — G89.29 CHRONIC PAIN OF LEFT KNEE: ICD-10-CM

## 2022-03-29 DIAGNOSIS — Z98.890 S/P ORIF (OPEN REDUCTION INTERNAL FIXATION) FRACTURE: ICD-10-CM

## 2022-03-29 PROCEDURE — 97110 THERAPEUTIC EXERCISES: CPT | Mod: GP | Performed by: PHYSICAL THERAPIST

## 2022-03-29 ASSESSMENT — ACTIVITIES OF DAILY LIVING (ADL)
PAIN: THE SYMPTOM AFFECTS MY ACTIVITY SLIGHTLY
AS_A_RESULT_OF_YOUR_KNEE_INJURY,_HOW_WOULD_YOU_RATE_YOUR_CURRENT_LEVEL_OF_DAILY_ACTIVITY?: ABNORMAL
WALK: ACTIVITY IS SOMEWHAT DIFFICULT
GO UP STAIRS: ACTIVITY IS FAIRLY DIFFICULT
KNEE_ACTIVITY_OF_DAILY_LIVING_SCORE: 57.14
STAND: ACTIVITY IS MINIMALLY DIFFICULT
GO DOWN STAIRS: ACTIVITY IS FAIRLY DIFFICULT
RAW_SCORE: 40
RISE FROM A CHAIR: ACTIVITY IS SOMEWHAT DIFFICULT
WEAKNESS: I HAVE THE SYMPTOM BUT IT DOES NOT AFFECT MY ACTIVITY
SQUAT: ACTIVITY IS FAIRLY DIFFICULT
KNEE_ACTIVITY_OF_DAILY_LIVING_SUM: 40
GIVING WAY, BUCKLING OR SHIFTING OF KNEE: I DO NOT HAVE THE SYMPTOM
KNEEL ON THE FRONT OF YOUR KNEE: I AM UNABLE TO DO THE ACTIVITY
STIFFNESS: THE SYMPTOM AFFECTS MY ACTIVITY SEVERELY
SWELLING: I DO NOT HAVE THE SYMPTOM
LIMPING: THE SYMPTOM AFFECTS MY ACTIVITY SLIGHTLY
SIT WITH YOUR KNEE BENT: ACTIVITY IS SOMEWHAT DIFFICULT
HOW_WOULD_YOU_RATE_THE_OVERALL_FUNCTION_OF_YOUR_KNEE_DURING_YOUR_USUAL_DAILY_ACTIVITIES?: ABNORMAL

## 2022-03-29 NOTE — PROGRESS NOTES
Subjective:  HPI  Physical Exam       Knee Activity of Daily Living Score: 57.14            Objective:  System    Physical Exam    General     ROS    Assessment/Plan:    DISCHARGE REPORT    Progress reporting period is from IE to 3/29/2022.       SUBJECTIVE  Subjective changes noted by patient:  .  Subjective: Fair HEP compliance.  Feeling stable on L leg, no buckling/giving out    Current pain level is  Current Pain level:  (0-1/10).     Previous pain level was   Initial Pain level: 1/10 (more intermittent).   Changes in function:  Yes (See Goal flowsheet attached for changes in current functional level)  Adverse reaction to treatment or activity: None    OBJECTIVE  Changes noted in objective findings:    Objective: Good QS supine no towel.  Slight (2-3*) ext lag with SLR  ROM: 0-0-60/65     ASSESSMENT/PLAN  Updated problem list and treatment plan: Diagnosis 1:  L knee pain  Pain -  home program  Decreased ROM/flexibility - home program  Decreased proprioception - home program  Impaired gait - home program  Impaired muscle performance - home program  Decreased function - home program  STG/LTGs have been met or progress has been made towards goals:  Yes (See Goal flow sheet completed today.)  Assessment of Progress: The patient's condition is improving.  Self Management Plans:  Patient is independent in a home treatment program.  Patient is independent in self management of symptoms.  I have re-evaluated this patient and find that the nature, scope, duration and intensity of the therapy is appropriate for the medical condition of the patient.  Angeli continues to require the following intervention to meet STG and LTG's:  PT intervention is no longer required to meet STG/LTG.    Recommendations:  This patient is ready to be discharged from therapy and continue their home treatment program.  Pt will return to PT and be evaluated after surgery 5/31/2022.  To cont with established HEP until that time    Please refer  to the daily flowsheet for treatment today, total treatment time and time spent performing 1:1 timed codes.

## 2022-03-29 NOTE — LETTER
[facility]  [address]  [facility] [address]  [phone]    2022    Re: Angeli Jacobson   :   1950  MRN:  [id number]   REFERRING PHYSICIAN:   Spencer TOMPKINS [facility]  Date of Initial Evaluation:  3/15/22  Visits:  Rxs Used: 2  Reason for Referral: S/P ORIF (open reduction internal fixation) fracture; Chronic pain of left knee     DISCHARGE REPORT  Progress reporting period is from IE to 3/29/2022.       SUBJECTIVE  Subjective changes noted by patient: Subjective: Fair HEP compliance.  Feeling stable on L leg, no buckling/giving out    Current pain level is  Current Pain level:  (0-1/10).     Previous pain level was   Initial Pain level: 1/10 (more intermittent).   Changes in function:  Yes (See Goal flowsheet attached for changes in current functional level)  Adverse reaction to treatment or activity: None    OBJECTIVE  Changes noted in objective findings:  Objective: Good QS supine no towel.  Slight (2-3*) ext lag with SLR  ROM: 0-0-60/65   Knee Activity of Daily Living Score: 57.14              ASSESSMENT/PLAN  Updated problem list and treatment plan: Diagnosis 1:  L knee pain  Pain -  home program  Decreased ROM/flexibility - home program  Decreased proprioception - home program  Impaired gait - home program  Impaired muscle performance - home program  Decreased function - home program  STG/LTGs have been met or progress has been made towards goals:  Yes (See Goal flow sheet completed today.)  Assessment of Progress: The patient's condition is improving.  Self Management Plans:  Patient is independent in a home treatment program.  Patient is independent in self management of symptoms.  I have re-evaluated this patient and find that the nature, scope, duration and intensity of the therapy is appropriate for the medical condition of the patient.  Angeli continues to  require the following intervention to meet STG and LTG's:  PT intervention is no longer required to meet STG/LTG.    Recommendations:  This patient is ready to be discharged from therapy and continue their home treatment program.  Pt will return to PT and be evaluated after surgery 5/31/2022.  To cont with established HEP until that time    Thank you for your referral.    INQUIRIES  Therapist: Chaka Paulino [address] [address]  Phone: [phone]  Fax: [phone]

## 2022-03-31 ENCOUNTER — MYC MEDICAL ADVICE (OUTPATIENT)
Dept: FAMILY MEDICINE | Facility: CLINIC | Age: 72
End: 2022-03-31
Payer: COMMERCIAL

## 2022-03-31 NOTE — TELEPHONE ENCOUNTER
Historically reported Fosamax has been removed from medication list per patient request.    Gauri Meier RN

## 2022-04-01 NOTE — TELEPHONE ENCOUNTER
See my chart     Le Thomas, Registered Nurse, PAL (Patient Advocate Liason)   [facility]   [phone]

## 2022-04-04 ENCOUNTER — HOSPITAL ENCOUNTER (OUTPATIENT)
Dept: PHYSICAL THERAPY | Facility: CLINIC | Age: 72
Discharge: HOME | End: 2022-04-04
Attending: STUDENT IN AN ORGANIZED HEALTH CARE EDUCATION/TRAINING PROGRAM
Payer: COMMERCIAL

## 2022-04-04 DIAGNOSIS — M79.662 PAIN OF LEFT LOWER LEG: ICD-10-CM

## 2022-04-04 DIAGNOSIS — M25.552 HIP PAIN, LEFT: ICD-10-CM

## 2022-04-04 DIAGNOSIS — T82.898S OCCLUSION OF LEFT FEMOROPOPLITEAL BYPASS GRAFT, SEQUELA: ICD-10-CM

## 2022-04-04 DIAGNOSIS — L90.5 SCAR CONDITION AND FIBROSIS OF SKIN: ICD-10-CM

## 2022-04-04 DIAGNOSIS — Z96.649 STATUS POST TOTAL REPLACEMENT OF HIP, UNSPECIFIED LATERALITY: ICD-10-CM

## 2022-04-04 DIAGNOSIS — I89.0 LYMPHEDEMA OF LEFT LEG: ICD-10-CM

## 2022-04-04 DIAGNOSIS — I89.0 LYMPHEDEMA: Primary | ICD-10-CM

## 2022-04-04 PROCEDURE — 97140 MANUAL THERAPY 1/> REGIONS: CPT | Mod: GP | Performed by: PHYSICAL THERAPIST

## 2022-04-04 PROCEDURE — 97110 THERAPEUTIC EXERCISES: CPT | Mod: GP | Performed by: PHYSICAL THERAPIST

## 2022-04-04 PROCEDURE — 97161 PT EVAL LOW COMPLEX 20 MIN: CPT | Mod: GP | Performed by: PHYSICAL THERAPIST

## 2022-04-04 NOTE — PROGRESS NOTES
04/04/22 1200   Quick Adds   Quick Adds Certification   Rehab Discipline   Discipline PT   Type of Visit   Type of visit Initial Edema Evaluation   General Information   Start of care 04/04/22   Referring physician Leeann Sharma MD   Orders Evaluate and treat as indicated   Order date 03/01/22   Medical diagnosis lymphedema; occlusion of l femopop bypass graft, L hip pain, s/p GISSELL   Onset of illness / date of surgery 03/01/22  (date of order)   Edema onset 03/01/22  (date of order)   Affected body parts LLE   Edema etiology Cancer with lymph node dissection;Radiation;Surgery;GISSELL   Location - 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   Location - Radiation completed 2000   Edema etiology comments 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, L LE PAD s/p fempop bypass with stent on DAPT, h/o L tibia fracture and quad rupture with repair 6/2020; s/p L knee quadraplasty 7/2021; L hip fracture 40/2019 s/p GISSELL 10/2019, HTN, hypothyroidism, lymphedema, severe osteroporasis, chronic pain   Pertinent history of current problem (PT: include personal factors and/or comorbidities that impact the POC; OT: include additional occupational profile info) complex medical history, chronicity of problem, chronic pain   Surgical / medical history reviewed Yes   Prior level of functional mobility I ADLs; gait limited by L LE pain; EMR stating walking 10blocks daily   Prior treatment Complete decongestive therapy   Community support Family / friend caregiver   Patient role / employment history Retired   General observations scheduled May 31/2022 for Z quadraceplasty ; worsening edema of L LQ ; planning trip  4//15-5/19/20[place], [place]_   Fall Risk Screen   Fall screen completed by PT   Have you fallen 2 or more times in the past year? No   Have you fallen and had an injury in the past year? No   Is patient a fall risk? Yes;Department fall risk interventions implemented   Abuse Screen  "(yes response referral indicated)   Feels Unsafe at Home or Work/School no   Feels Threatened by Someone no   Does Anyone Try to Keep You From Having Contact with Others or Doing Things Outside Your Home? no   Physical Signs of Abuse Present no   Patient needs abuse support services and resources No   System Outcome Measures   Outcome Measures Lymphedema   Lymphedema Life Impact Scale (score range 0-72). A higher score indicates greater impairment. 29   Patient / Family Goals   Patient / family goals statement optimize lymphedema managment prior to trip and surgery   Pain   Pain comments L LE   Vitals Signs   Weight 151; 5'6\"   Cognitive Status   Orientation Orientation to person, place and time   Edema Exam / Assessment   Skin condition Pitting;Non-pitting;Intact   Skin condition comments 1+ pretibial pitting L LE ; moderate fibrosis thorughotu L LE and nopitting edema extending into L LQ; sparing of L foot   Pitting 1+   Pitting location L pretibial and medial knee; nonptting thorughotu extending into L LQ   Capillary refill Symmetrical   Stemmer sign Positive   Range of Motion   ROM comments WFL except L LE very limited; L knee 0-20   Strength   Strength comments WFL   Gait / Locomotion   Gait / Locomotion impaired; antalgic   Sensory   Sensory perception comments decreased L LE   Vascular Assessment   Vascular Assessment Comments ADRIAN and TBI WNL 3/11/2022 per EMR   Planned Edema Interventions   Planned edema interventions Manual lymph drainage;Gradient compression bandaging;Fit for compression garment;Exercises;Precautions to prevent infection / exacerbation;Education;Manual therapy;ADL training;Skin care / precautions;Scar mobilization;Soft tissue mobilization;Home management program development   Planned edema intervention comments focus on upgrading current lymphedema home program   Clinical Impression   Criteria for skilled therapeutic intervention met Yes   Therapy diagnosis lymphedema, fibrosis, pain, " decreased gait and funcitonal mobilty   Influenced by the following impairments / conditions Edema;Stage 2   Functional limitations due to impairments / conditions elevated risk of infections, elevated risk of falls, decreased gait and funcitonal mobilty   Clinical Presentation Stable/Uncomplicated   Clinical Presentation Rationale reporting stable status since prior CLT   Clinical Decision Making (Complexity) Low complexity   Treatment Frequency Other (see comments)   Treatment duration 10x over 3 months   Patient / family and/or staff in agreement with plan of care Yes   Risks and benefits of therapy have been explained Yes   Clinical impression comments patient with chronic L LE lymphedema and would benfit from therapy to achieve stated goals   Education Assessment   Preferred learning style Listening;Reading;Demonstration   Barriers to learning No barriers   Goal 1   Goal identifier Home program   Goal description Patient independent in upgraded home program to manage condition of lymphedema   Target date 07/03/22   Goal 2   Goal identifier LLIS   Goal description Decrease score of LLIS by 5 points 24 or less to demonstrate decreased impact of lymphedema on function   Target date 07/03/22   Total Evaluation Time   PT Eval, Low Complexity Minutes (88839) 20   Certification   Certification date from 04/04/22   Certification date to 07/03/22   Medical Diagnosis lymphedema, occlusion of L fempop bypass graft, L hip pain, s/p GISSELL   Certification I certify the need for these services furnished under this plan of treatment and while under my care.  (Physician co-signature of this document indicates review and certification of the therapy plan).

## 2022-04-04 NOTE — PROGRESS NOTES
[facility]        OUTPATIENT PHYSICAL THERAPY EDEMA EVALUATION  PLAN OF TREATMENT FOR OUTPATIENT REHABILITATION  (COMPLETE FOR INITIAL CLAIMS ONLY)  Patient's Last Name, First Name, Angeli Pond                           Provider s Name:   [facility] Medical Record No.  [id number]     Start of Care Date:  04/04/22   Onset Date:  03/01/22 (date of order)   Type:  PT   Medical Diagnosis:  lymphedema, occlusion of L fempop bypass graft, L hip pain, s/p GISSELL   Therapy Diagnosis:  lymphedema, fibrosis, pain, decreased gait and funcitonal mobilty Visits from SOC:  1                                     __________________________________________________________________________________   Plan of Treatment/Functional Goals:    Manual lymph drainage, Gradient compression bandaging, Fit for compression garment, Exercises, Precautions to prevent infection / exacerbation, Education, Manual therapy, ADL training, Skin care / precautions, Scar mobilization, Soft tissue mobilization, Home management program development  focus on upgrading current lymphedema home program     GOALS  1. Goal description: Patient independent in upgraded home program to manage condition of lymphedema       Target date: 07/03/22  2. Goal description: Decrease score of LLIS by 5 points 24 or less to demonstrate decreased impact of lymphedema on function       Target date: 07/03/22    Treatment Frequency: Other (see comments)   Treatment duration: 10x over 3 months    Mariana Guzman, PT                                    I CERTIFY THE NEED FOR THESE SERVICES FURNISHED UNDER        THIS PLAN OF TREATMENT AND WHILE UNDER MY CARE     (Physician co-signature of this document indicates review and certification of the therapy plan).                   Certification date from: 04/04/22       Certification  General Surgery Discharge Summary    DATE OF ADMISSION: 5/16/2018    DATE OF DISCHARGE: 5/22/2018      ATTENDING PHYSICIAN: Roberto Alberto M.D.    CONSULTING PHYSICIAN:   1. Shakir Doyle MD, Pediatrics  2.     DISCHARGE DIAGNOSIS:  1. Acute Appendicitis with Rupture  2.     PROCEDURES:  1. Procedure completed by  on 5/16/2019, Laparoscopic appendectomy     HISTORY OF PRESENT ILLNESS: The patient is a 9 y.o. female who presented to the emergency department for further evaluation of abdominal pain. Work up revealed acute appendicitis    HOSPITAL COURSE: The patient was admitted to the critical care team under the direction and supervision of Dr. Alberto . She sustained the listed diagnosis and incurred the listed procedure during her stay.    she was transferred from the emergency department to the operating room.    Postoperatively she was transferred to the pediatrics floor. She has been in the hospital for pain control, pulmonary toileting and  IV antibiotics.    On the day of discharge she is tolerating by mouth antibiotics to finish her course. She is on a regular diet. She is stooling and she is in ambulatory. Her parents are in agreement with discharge plan.      DISCHARGE MEDICATIONS:  I reviewed the patients controlled substance history and obtained a controlled substance use informed consent (if applicable) provided by [facility] and the patient has been prescribed.       Medication List      START taking these medications      Instructions   amoxicillin-clavulanate 400-57 MG/5ML Susr suspension  Commonly known as:  AUGMENTIN   Take 14 mL by mouth every 12 hours for 2 days.  Dose:  45 mg/kg/day     HYDROcodone-acetaminophen 2.5-108 mg/5mL 7.5-325 MG/15ML solution  Commonly known as:  HYCET   Take 9.4 mL by mouth every four hours as needed for up to 7 days.  Dose:  0.1 mg/kg            DISPOSITION: The patient will be discharged home in stable condition on 5/22/2018. She will  date to: 07/03/22           Referring physician: Leeann Sharma MD   Initial Assessment  See Epic Evaluation- Start of care: 04/04/22                follow up with Dr. Alberto in one week.    The patient and family have been extensively counseled and all questions have been answered. Special attention was paid to respiratory decompensation, worsening abdominal pain and signs and symptoms of infection and to seek immediate medical attention if these develop. The patient and family demonstrate understanding and give verbal compliance with discharge instructions.    TIME SPENT ON DISCHARGE: 30minutes        ____________________________________________  ROBY Rojas.    DD: 5/22/2018 9:33 AM

## 2022-04-13 ENCOUNTER — HOSPITAL ENCOUNTER (OUTPATIENT)
Dept: PHYSICAL THERAPY | Facility: CLINIC | Age: 72
Discharge: HOME | End: 2022-04-13
Payer: COMMERCIAL

## 2022-04-13 DIAGNOSIS — G89.29 CHRONIC PAIN OF LEFT KNEE: ICD-10-CM

## 2022-04-13 DIAGNOSIS — M25.562 CHRONIC PAIN OF LEFT KNEE: ICD-10-CM

## 2022-04-13 DIAGNOSIS — L90.5 SCAR CONDITION AND FIBROSIS OF SKIN: ICD-10-CM

## 2022-04-13 DIAGNOSIS — M79.662 PAIN OF LEFT LOWER LEG: ICD-10-CM

## 2022-04-13 DIAGNOSIS — I89.0 LYMPHEDEMA: Primary | ICD-10-CM

## 2022-04-13 DIAGNOSIS — Z96.649 STATUS POST TOTAL REPLACEMENT OF HIP, UNSPECIFIED LATERALITY: ICD-10-CM

## 2022-04-13 DIAGNOSIS — I89.0 LYMPHEDEMA OF LEFT LEG: ICD-10-CM

## 2022-04-13 PROCEDURE — 97110 THERAPEUTIC EXERCISES: CPT | Mod: GP | Performed by: PHYSICAL THERAPIST

## 2022-04-13 PROCEDURE — 97140 MANUAL THERAPY 1/> REGIONS: CPT | Mod: GP | Performed by: PHYSICAL THERAPIST

## 2022-04-14 ENCOUNTER — LAB (OUTPATIENT)
Dept: LAB | Facility: CLINIC | Age: 72
End: 2022-04-14
Attending: FAMILY MEDICINE
Payer: COMMERCIAL

## 2022-04-14 DIAGNOSIS — Z20.822 ENCOUNTER FOR LABORATORY TESTING FOR COVID-19 VIRUS: ICD-10-CM

## 2022-04-14 LAB — SARS-COV-2 RNA RESP QL NAA+PROBE: NEGATIVE

## 2022-04-14 PROCEDURE — U0003 INFECTIOUS AGENT DETECTION BY NUCLEIC ACID (DNA OR RNA); SEVERE ACUTE RESPIRATORY SYNDROME CORONAVIRUS 2 (SARS-COV-2) (CORONAVIRUS DISEASE [COVID-19]), AMPLIFIED PROBE TECHNIQUE, MAKING USE OF HIGH THROUGHPUT TECHNOLOGIES AS DESCRIBED BY CMS-2020-01-R: HCPCS

## 2022-04-14 PROCEDURE — U0005 INFEC AGEN DETEC AMPLI PROBE: HCPCS

## 2022-05-18 DIAGNOSIS — Z11.59 ENCOUNTER FOR SCREENING FOR OTHER VIRAL DISEASES: Primary | ICD-10-CM

## 2022-05-20 ENCOUNTER — PATIENT OUTREACH (OUTPATIENT)
Dept: FAMILY MEDICINE | Facility: CLINIC | Age: 72
End: 2022-05-20
Payer: COMMERCIAL

## 2022-05-20 NOTE — TELEPHONE ENCOUNTER
Pt agreeable to do ANW visit on 5/26/22 @12:45 PM    Tresa Joseph RN, BSN, PHN  [facility]

## 2022-05-23 ENCOUNTER — OFFICE VISIT (OUTPATIENT)
Dept: CARDIOLOGY | Facility: CLINIC | Age: 72
End: 2022-05-23
Payer: COMMERCIAL

## 2022-05-23 ENCOUNTER — TELEPHONE (OUTPATIENT)
Dept: CARDIOLOGY | Facility: CLINIC | Age: 72
End: 2022-05-23

## 2022-05-23 VITALS
HEIGHT: 66 IN | BODY MASS INDEX: 24.12 KG/M2 | SYSTOLIC BLOOD PRESSURE: 132 MMHG | WEIGHT: 150.1 LBS | DIASTOLIC BLOOD PRESSURE: 80 MMHG | HEART RATE: 66 BPM | OXYGEN SATURATION: 98 %

## 2022-05-23 DIAGNOSIS — E78.5 HYPERLIPIDEMIA LDL GOAL <70: ICD-10-CM

## 2022-05-23 DIAGNOSIS — I25.10 CORONARY ARTERY DISEASE INVOLVING NATIVE CORONARY ARTERY OF NATIVE HEART WITHOUT ANGINA PECTORIS: Primary | ICD-10-CM

## 2022-05-23 DIAGNOSIS — I73.9 PAD (PERIPHERAL ARTERY DISEASE) (H): ICD-10-CM

## 2022-05-23 DIAGNOSIS — Z85.831 HISTORY OF SARCOMA: ICD-10-CM

## 2022-05-23 PROCEDURE — 99214 OFFICE O/P EST MOD 30 MIN: CPT | Performed by: INTERNAL MEDICINE

## 2022-05-23 NOTE — TELEPHONE ENCOUNTER
----- Message from Spencer Wilder MD sent at 2022  2:56 PM CDT -----  Thanks Carlos!  Really appreciate it.  Christine/Lety, would you mind relaying this to Ms. Jacobson so that she is aware and see if that will work for them?    Thanks all!  Chris      ----- Message -----  From: Carlos Marte  Sent: 2022   2:47 PM CDT  To: Dara Ross, Spencer Wilder MD, #    Dara and Spencer,    I am backing up Britney today.  I spoke to the [employer] willie.  They stated pt will be good to still use the willie because at the time of the enrollment patient would be eligible until they are due to enroll again despite any changes in the meantime.  The patient's willie will  2022 but they stated for a 2 person household the minimum income will be $91,535.  I hope this helps and if you have any additional questions just let me know.    Carlos Marte [facility]  Specialty Pharmacy Clinic Liaison [facility]  [email or web address]   [email or web address]  Phone: #  Fax: [phone]    ----- Message -----  From: Dara Ross  Sent: 2022   1:57 PM CDT  To: Spencer Wilder MD, Britney Leger, #    Britney,    Could you look into this?    Dara Ross  Pharmacy Technician/Liaison, Discharge Pharmacy   [phone]  [email or web address]      ----- Message -----  From: Spencer Wilder MD  Sent: 2022   1:37 PM CDT  To: Hina Clay [facility] Heart Team 1    Hi all,    I believe that Ms. Jacobson was receiving her Repatha partly due to a willie which may have been income based.  Her  tells me that their income has changed significantly this year, and wanted to make sure that the willie would still cover them.  Is there any way that we can figure out if this is the case?      Thanks,  Chris

## 2022-05-23 NOTE — TELEPHONE ENCOUNTER
Spoke with pt about information regarding [employer] willie and that it expires on 7/30/22. Pt will let us know if she receives any information when it comes time to renew the willie.   Gave pt team 1's call back number.

## 2022-05-23 NOTE — LETTER
5/23/2022    Sepideh Burris PA-C  [id number] [facility] [id number]    RE: Angeli Jacobson       Dear Colleague,     I had the pleasure of seeing Angeli Jacobson in the [facility].  CARDIOLOGY CLINIC FOLLOW-UP VISIT      REASON FOR VISIT:   F/u CAD    PRIMARY CARE PHYSICIAN:  Sepideh Burris        History of Present Illness   Angeli Jacobson is an extremely pleasant 72 year old female here for routine follow-up.  She has a past medical history significant for severe coronary calcification by coronary calcium scan (CAC = 3897 in 2021), remote left thigh myxoid liposarcoma s/p resection in 2000 that resulted in removal of a portion of her femoral artery that was apparently then replaced by graft, recurrent peripheral artery disease in the same area s/p multiple peripheral vascular interventions, most recently a left femoral-popliteal bypass in 1/2019, as well as dyslipidemia and left tibial fracture in 12/2020 with several subsequent orthopedic surgeries, and chronic left lower extremity lymphedema.    I first met her in 8/2021 and she was entirely asymptomatic at the time, so we managed her medically by continuing her aspirin and Plavix.  We also continued her Crestor and Zetia, but she was quite far from her LDL goal, so we initiated the approval process for PCSK9 inhibitors, and she ended up receiving a willie for coverage of Repatha.  She has taken this now for around 6 months, and has been doing very well with this.  Her LDL has dropped from the 120-140 range, to the 50-60 range.  She has not had any side effects with this.  She continues to be asymptomatic from a cardiac standpoint today, denying any chest pains, shortness of breath, lightheadedness, palpitations, or lower extremity swelling.  She has an orthopedic surgery scheduled for next week per her report.    Her most recent lipid panel was from 3/2/2022, and showed a total cholesterol of 130, HDL of 54, LDL of  55, and triglycerides of 105.  Her most recent ECG was done on 7/21/2021 and showed normal sinus rhythm with right bundle branch block and left anterior fascicular block.  I do not see an echocardiogram in our system.      Assessment & Plan     1. CAD by CT scan (coronary calcium score 3897), asymptomatic  2. Severe LLE PAD s/p multiple interventions, most recently femoropopliteal bypass 1/2019  3. Familial hyperlipidemia, still inadequately controlled despite maximum tolerated statin and Zetia  4. Left tibial fracture in 12/2020 s/p several orthopedic surgeries  5. Chronic left lower extremity lymphedema      It was a pleasure to meet with Angeli and her  again in clinic today.  I am glad to hear that she continues to do well from a cardiac standpoint.  She has tolerated Repatha well with no issues and her LDL is at goal.  She does have significant coronary calcifications, so I would classify her as low-intermediate, perhaps up to intermediate, risk for MACE with upcoming orthopedic surgery.  That said, I do not think that further testing or intervention is likely to further reduce her risk, so I would recommend that she proceed with surgery as currently planned.      In regards to her dual antiplatelet therapy, I think that it is okay to hold her Plavix as needed per orthopedic surgery (whether this is 5 or 7 days beforehand).  I do want her to continue the baby aspirin throughout the surgery.    Finally, as far as her Repatha goes, she and her  are concerned that due to differences in income between last year and this 1, she may not qualify for her willie.  I will reach out to our nursing team and see if we can look into this.      -Conservative management of elevated, asymptomatic coronary calcium score for now  -Proceed with invasive angiography if suggestive symptoms develop in the future  -Continue DAPT with aspirin 81 mg daily and Plavix 75 mg daily.  Okay to hold Plavix prior to surgery as  above.  -Continue Crestor 20 mg daily and Zetia 10 mg daily  -Continue Repatha 140 mg every 14 days      Follow-up: 12 months, or sooner as needed      Spencer Wilder MD  Interventional Cardiology  May 23, 2022        Medications   Current Outpatient Medications   Medication     aspirin (ASA) 81 MG chewable tablet     calcium carbonate 600 mg-vitamin D 400 units (CALTRATE) 600-400 MG-UNIT per tablet     clopidogrel (PLAVIX) 75 MG tablet     econazole nitrate 1 % external cream     evolocumab (REPATHA) 140 MG/ML prefilled autoinjector     ezetimibe (ZETIA) 10 MG tablet     levothyroxine (SYNTHROID/LEVOTHROID) 75 MCG tablet     multivitamin, therapeutic (THERA-VIT) TABS tablet     nitroFURantoin macrocrystal-monohydrate (MACROBID) 100 MG capsule     order for DME     ORDER FOR DME     rosuvastatin (CRESTOR) 20 MG tablet     solifenacin (VESICARE) 5 MG tablet     Vitamin D3 (CHOLECALCIFEROL) 25 mcg (1000 units) tablet     No current facility-administered medications for this visit.     Allergies   Allergies   Allergen Reactions     Celexa [Citalopram Hydrobromide]      Decreased libido         Physical Exam       BP: 132/80 Pulse: 66     SpO2: 98 %      Vital Signs with Ranges  Pulse:  [66] 66  BP: (132)/(80) 132/80  SpO2:  [98 %] 98 %  150 lbs 1.6 oz    Constitutional: Well-appearing, no acute distress, left lower leg wrapped  Respiratory: Normal respiratory effort, CTAB  Cardiovascular: RRR, no m/r/g.  JVP < 7 cm H2O.  There is no right LE edema.  As above, left lower leg is wrapped with an Ace bandage and there is significant left lower extremity swelling noted.  Normal carotid upstrokes, no carotid bruits.      Thank you for allowing me to participate in the care of your patient.      Sincerely,     Spencer Wilder MD     [facility]  cc:   Referred Self,

## 2022-05-23 NOTE — PROGRESS NOTES
CARDIOLOGY CLINIC FOLLOW-UP VISIT      REASON FOR VISIT:   F/u CAD    PRIMARY CARE PHYSICIAN:  Sepideh Burris        History of Present Illness   Angeli Jacobson is an extremely pleasant 72 year old female here for routine follow-up.  She has a past medical history significant for severe coronary calcification by coronary calcium scan (CAC = 3897 in 2021), remote left thigh myxoid liposarcoma s/p resection in 2000 that resulted in removal of a portion of her femoral artery that was apparently then replaced by graft, recurrent peripheral artery disease in the same area s/p multiple peripheral vascular interventions, most recently a left femoral-popliteal bypass in 1/2019, as well as dyslipidemia and left tibial fracture in 12/2020 with several subsequent orthopedic surgeries, and chronic left lower extremity lymphedema.    I first met her in 8/2021 and she was entirely asymptomatic at the time, so we managed her medically by continuing her aspirin and Plavix.  We also continued her Crestor and Zetia, but she was quite far from her LDL goal, so we initiated the approval process for PCSK9 inhibitors, and she ended up receiving a willie for coverage of Repatha.  She has taken this now for around 6 months, and has been doing very well with this.  Her LDL has dropped from the 120-140 range, to the 50-60 range.  She has not had any side effects with this.  She continues to be asymptomatic from a cardiac standpoint today, denying any chest pains, shortness of breath, lightheadedness, palpitations, or lower extremity swelling.  She has an orthopedic surgery scheduled for next week per her report.    Her most recent lipid panel was from 3/2/2022, and showed a total cholesterol of 130, HDL of 54, LDL of 55, and triglycerides of 105.  Her most recent ECG was done on 7/21/2021 and showed normal sinus rhythm with right bundle branch block and left anterior fascicular block.  I do not see an echocardiogram in our  system.      Assessment & Plan     1. CAD by CT scan (coronary calcium score 3897), asymptomatic  2. Severe LLE PAD s/p multiple interventions, most recently femoropopliteal bypass 1/2019  3. Familial hyperlipidemia, still inadequately controlled despite maximum tolerated statin and Zetia  4. Left tibial fracture in 12/2020 s/p several orthopedic surgeries  5. Chronic left lower extremity lymphedema      It was a pleasure to meet with Angeli and her  again in clinic today.  I am glad to hear that she continues to do well from a cardiac standpoint.  She has tolerated Repatha well with no issues and her LDL is at goal.  She does have significant coronary calcifications, so I would classify her as low-intermediate, perhaps up to intermediate, risk for MACE with upcoming orthopedic surgery.  That said, I do not think that further testing or intervention is likely to further reduce her risk, so I would recommend that she proceed with surgery as currently planned.      In regards to her dual antiplatelet therapy, I think that it is okay to hold her Plavix as needed per orthopedic surgery (whether this is 5 or 7 days beforehand).  I do want her to continue the baby aspirin throughout the surgery.    Finally, as far as her Repatha goes, she and her  are concerned that due to differences in income between last year and this 1, she may not qualify for her willie.  I will reach out to our nursing team and see if we can look into this.      -Conservative management of elevated, asymptomatic coronary calcium score for now  -Proceed with invasive angiography if suggestive symptoms develop in the future  -Continue DAPT with aspirin 81 mg daily and Plavix 75 mg daily.  Okay to hold Plavix prior to surgery as above.  -Continue Crestor 20 mg daily and Zetia 10 mg daily  -Continue Repatha 140 mg every 14 days      Follow-up: 12 months, or sooner as needed      Spencer Wilder MD  Interventional Cardiology  May 23,  2022        Medications   Current Outpatient Medications   Medication     aspirin (ASA) 81 MG chewable tablet     calcium carbonate 600 mg-vitamin D 400 units (CALTRATE) 600-400 MG-UNIT per tablet     clopidogrel (PLAVIX) 75 MG tablet     econazole nitrate 1 % external cream     evolocumab (REPATHA) 140 MG/ML prefilled autoinjector     ezetimibe (ZETIA) 10 MG tablet     levothyroxine (SYNTHROID/LEVOTHROID) 75 MCG tablet     multivitamin, therapeutic (THERA-VIT) TABS tablet     nitroFURantoin macrocrystal-monohydrate (MACROBID) 100 MG capsule     order for DME     ORDER FOR DME     rosuvastatin (CRESTOR) 20 MG tablet     solifenacin (VESICARE) 5 MG tablet     Vitamin D3 (CHOLECALCIFEROL) 25 mcg (1000 units) tablet     No current facility-administered medications for this visit.     Allergies   Allergies   Allergen Reactions     Celexa [Citalopram Hydrobromide]      Decreased libido         Physical Exam       BP: 132/80 Pulse: 66     SpO2: 98 %      Vital Signs with Ranges  Pulse:  [66] 66  BP: (132)/(80) 132/80  SpO2:  [98 %] 98 %  150 lbs 1.6 oz    Constitutional: Well-appearing, no acute distress, left lower leg wrapped  Respiratory: Normal respiratory effort, CTAB  Cardiovascular: RRR, no m/r/g.  JVP < 7 cm H2O.  There is no right LE edema.  As above, left lower leg is wrapped with an Ace bandage and there is significant left lower extremity swelling noted.  Normal carotid upstrokes, no carotid bruits.

## 2022-05-24 ENCOUNTER — HOSPITAL ENCOUNTER (OUTPATIENT)
Dept: PHYSICAL THERAPY | Facility: CLINIC | Age: 72
Discharge: HOME | End: 2022-05-24
Payer: COMMERCIAL

## 2022-05-24 DIAGNOSIS — M79.662 PAIN OF LEFT LOWER LEG: ICD-10-CM

## 2022-05-24 DIAGNOSIS — I89.0 LYMPHEDEMA: Primary | ICD-10-CM

## 2022-05-24 DIAGNOSIS — T82.898S OCCLUSION OF LEFT FEMOROPOPLITEAL BYPASS GRAFT, SEQUELA: ICD-10-CM

## 2022-05-24 DIAGNOSIS — Z96.649 STATUS POST TOTAL REPLACEMENT OF HIP, UNSPECIFIED LATERALITY: ICD-10-CM

## 2022-05-24 DIAGNOSIS — M25.562 CHRONIC PAIN OF LEFT KNEE: ICD-10-CM

## 2022-05-24 DIAGNOSIS — L90.5 SCAR CONDITION AND FIBROSIS OF SKIN: ICD-10-CM

## 2022-05-24 DIAGNOSIS — I89.0 LYMPHEDEMA OF LEFT LEG: ICD-10-CM

## 2022-05-24 DIAGNOSIS — Z87.81 S/P ORIF (OPEN REDUCTION INTERNAL FIXATION) FRACTURE: ICD-10-CM

## 2022-05-24 DIAGNOSIS — M25.552 HIP PAIN, LEFT: ICD-10-CM

## 2022-05-24 DIAGNOSIS — Z98.890 S/P ORIF (OPEN REDUCTION INTERNAL FIXATION) FRACTURE: ICD-10-CM

## 2022-05-24 DIAGNOSIS — G89.29 CHRONIC PAIN OF LEFT KNEE: ICD-10-CM

## 2022-05-24 PROCEDURE — 97110 THERAPEUTIC EXERCISES: CPT | Mod: GP | Performed by: PHYSICAL THERAPIST

## 2022-05-24 PROCEDURE — 97140 MANUAL THERAPY 1/> REGIONS: CPT | Mod: GP | Performed by: PHYSICAL THERAPIST

## 2022-05-25 ENCOUNTER — HOSPITAL ENCOUNTER (OUTPATIENT)
Dept: PHYSICAL THERAPY | Facility: CLINIC | Age: 72
Discharge: HOME | End: 2022-05-25
Payer: COMMERCIAL

## 2022-05-25 DIAGNOSIS — M79.662 PAIN OF LEFT LOWER LEG: ICD-10-CM

## 2022-05-25 DIAGNOSIS — Z87.81 S/P ORIF (OPEN REDUCTION INTERNAL FIXATION) FRACTURE: ICD-10-CM

## 2022-05-25 DIAGNOSIS — I89.0 LYMPHEDEMA: Primary | ICD-10-CM

## 2022-05-25 DIAGNOSIS — Z98.890 S/P ORIF (OPEN REDUCTION INTERNAL FIXATION) FRACTURE: ICD-10-CM

## 2022-05-25 DIAGNOSIS — L90.5 SCAR CONDITION AND FIBROSIS OF SKIN: ICD-10-CM

## 2022-05-25 DIAGNOSIS — I89.0 LYMPHEDEMA OF LEFT LEG: ICD-10-CM

## 2022-05-25 DIAGNOSIS — T82.898S OCCLUSION OF LEFT FEMOROPOPLITEAL BYPASS GRAFT, SEQUELA: ICD-10-CM

## 2022-05-25 DIAGNOSIS — Z96.649 STATUS POST TOTAL REPLACEMENT OF HIP, UNSPECIFIED LATERALITY: ICD-10-CM

## 2022-05-25 DIAGNOSIS — M25.552 HIP PAIN, LEFT: ICD-10-CM

## 2022-05-25 PROCEDURE — 97140 MANUAL THERAPY 1/> REGIONS: CPT | Mod: GP | Performed by: PHYSICAL THERAPIST

## 2022-05-25 PROCEDURE — 97110 THERAPEUTIC EXERCISES: CPT | Mod: GP | Performed by: PHYSICAL THERAPIST

## 2022-05-25 NOTE — PHARMACY-ADMISSION MEDICATION HISTORY
Medication history and patient interview completed by pharmacy intern/student or pre-admitting RN.  Reviewed by pharmacist, including [employer] dispense records, [facility] Everywhere, and chart review.       Alexander Barnett, Pharm.D., BCPS      Nurse Complete Set By: Catina Potts, RN at 05/19/2022 3:58 PM      Prior to Admission medications    Medication Sig Last Dose Taking? Auth Provider   aspirin (ASA) 81 MG chewable tablet Take 81 mg by mouth daily  Yes Reported, Patient   calcium carbonate 600 mg-vitamin D 400 units (CALTRATE) 600-400 MG-UNIT per tablet Take 1 chew tab by mouth daily  Yes Reported, Patient   clopidogrel (PLAVIX) 75 MG tablet TAKE 1 TABLET BY MOUTH EVERY DAY  Yes Tiesha Prince MD   econazole nitrate 1 % external cream Apply topically daily  Patient taking differently: Apply topically daily Toes  Yes Sepideh Burris PA-C   evolocumab (REPATHA) 140 MG/ML prefilled autoinjector Inject 1 mL (140 mg) Subcutaneous every 14 days 5/13/2022 Yes Spencer Wilder MD   ezetimibe (ZETIA) 10 MG tablet Take 1 tablet (10 mg) by mouth daily  Yes Tiesha Prince MD   levothyroxine (SYNTHROID/LEVOTHROID) 75 MCG tablet Take 1 tablet (75 mcg) by mouth daily  Yes Tiesha Prince MD   multivitamin, therapeutic (THERA-VIT) TABS tablet Take 1 tablet by mouth daily  Yes Reported, Patient   nitroFURantoin macrocrystal-monohydrate (MACROBID) 100 MG capsule TAKE 1 CAPSULE BY MOUTH AFTER INTRCOURSE  Yes Sepideh Burris PA-C   rosuvastatin (CRESTOR) 20 MG tablet Take 1 tablet (20 mg) by mouth daily  Yes Tiesha Prince MD   solifenacin (VESICARE) 5 MG tablet Take 1 tablet (5 mg) by mouth daily  Yes Jennifer Oquendo MD   Vitamin D3 (CHOLECALCIFEROL) 25 mcg (1000 units) tablet Take 2 tablets (50 mcg) by mouth daily  Yes Sepideh Burris PA-C   order for DME Equipment being ordered: Left Thigh High Compression Stocking, 550 CCL.3   Yanelis Bailey MD   ORDER  FOR DME Equipment being ordered: lymphedema bandages   Yanelis Bailey MD

## 2022-05-26 ENCOUNTER — OFFICE VISIT (OUTPATIENT)
Dept: FAMILY MEDICINE | Facility: CLINIC | Age: 72
End: 2022-05-26
Payer: COMMERCIAL

## 2022-05-26 ENCOUNTER — MYC MEDICAL ADVICE (OUTPATIENT)
Dept: FAMILY MEDICINE | Facility: CLINIC | Age: 72
End: 2022-05-26

## 2022-05-26 ENCOUNTER — HOSPITAL ENCOUNTER (OUTPATIENT)
Dept: PHYSICAL THERAPY | Facility: CLINIC | Age: 72
Discharge: HOME | End: 2022-05-26
Payer: COMMERCIAL

## 2022-05-26 ENCOUNTER — TRANSFERRED RECORDS (OUTPATIENT)
Dept: HEALTH INFORMATION MANAGEMENT | Facility: CLINIC | Age: 72
End: 2022-05-26

## 2022-05-26 VITALS
HEART RATE: 73 BPM | OXYGEN SATURATION: 97 % | BODY MASS INDEX: 24.36 KG/M2 | SYSTOLIC BLOOD PRESSURE: 136 MMHG | WEIGHT: 151.6 LBS | DIASTOLIC BLOOD PRESSURE: 78 MMHG | TEMPERATURE: 97.9 F | HEIGHT: 66 IN

## 2022-05-26 DIAGNOSIS — G89.29 CHRONIC PAIN OF LEFT KNEE: ICD-10-CM

## 2022-05-26 DIAGNOSIS — T82.898S OCCLUSION OF LEFT FEMOROPOPLITEAL BYPASS GRAFT, SEQUELA: ICD-10-CM

## 2022-05-26 DIAGNOSIS — Z98.890 S/P ORIF (OPEN REDUCTION INTERNAL FIXATION) FRACTURE: ICD-10-CM

## 2022-05-26 DIAGNOSIS — Z87.81 S/P ORIF (OPEN REDUCTION INTERNAL FIXATION) FRACTURE: ICD-10-CM

## 2022-05-26 DIAGNOSIS — I89.0 LYMPHEDEMA: Primary | ICD-10-CM

## 2022-05-26 DIAGNOSIS — M79.662 PAIN OF LEFT LOWER LEG: ICD-10-CM

## 2022-05-26 DIAGNOSIS — I89.0 LYMPHEDEMA OF LEFT LEG: ICD-10-CM

## 2022-05-26 DIAGNOSIS — L90.5 SCAR CONDITION AND FIBROSIS OF SKIN: ICD-10-CM

## 2022-05-26 DIAGNOSIS — Z00.00 ENCOUNTER FOR MEDICARE ANNUAL WELLNESS EXAM: ICD-10-CM

## 2022-05-26 DIAGNOSIS — Z96.649 STATUS POST TOTAL REPLACEMENT OF HIP, UNSPECIFIED LATERALITY: ICD-10-CM

## 2022-05-26 DIAGNOSIS — Z01.818 PREOP GENERAL PHYSICAL EXAM: Primary | ICD-10-CM

## 2022-05-26 DIAGNOSIS — M25.562 CHRONIC PAIN OF LEFT KNEE: ICD-10-CM

## 2022-05-26 LAB
ERYTHROCYTE [DISTWIDTH] IN BLOOD BY AUTOMATED COUNT: 13.5 % (ref 10–15)
HCT VFR BLD AUTO: 43.4 % (ref 35–47)
HGB BLD-MCNC: 14 G/DL (ref 11.7–15.7)
MCH RBC QN AUTO: 30.2 PG (ref 26.5–33)
MCHC RBC AUTO-ENTMCNC: 32.3 G/DL (ref 31.5–36.5)
MCV RBC AUTO: 94 FL (ref 78–100)
PLATELET # BLD AUTO: 242 10E3/UL (ref 150–450)
RBC # BLD AUTO: 4.63 10E6/UL (ref 3.8–5.2)
WBC # BLD AUTO: 6.3 10E3/UL (ref 4–11)

## 2022-05-26 PROCEDURE — 99397 PER PM REEVAL EST PAT 65+ YR: CPT | Performed by: PHYSICIAN ASSISTANT

## 2022-05-26 PROCEDURE — 93000 ELECTROCARDIOGRAM COMPLETE: CPT | Performed by: PHYSICIAN ASSISTANT

## 2022-05-26 PROCEDURE — 97140 MANUAL THERAPY 1/> REGIONS: CPT | Mod: GP | Performed by: PHYSICAL THERAPIST

## 2022-05-26 PROCEDURE — 99213 OFFICE O/P EST LOW 20 MIN: CPT | Mod: 25 | Performed by: PHYSICIAN ASSISTANT

## 2022-05-26 PROCEDURE — 85027 COMPLETE CBC AUTOMATED: CPT | Performed by: PHYSICIAN ASSISTANT

## 2022-05-26 PROCEDURE — 80048 BASIC METABOLIC PNL TOTAL CA: CPT | Performed by: PHYSICIAN ASSISTANT

## 2022-05-26 PROCEDURE — 36415 COLL VENOUS BLD VENIPUNCTURE: CPT | Performed by: PHYSICIAN ASSISTANT

## 2022-05-26 ASSESSMENT — ENCOUNTER SYMPTOMS
HEMATURIA: 0
FEVER: 0
DYSURIA: 0
SHORTNESS OF BREATH: 0
MYALGIAS: 0
WEAKNESS: 0
CHILLS: 0
HEMATOCHEZIA: 0
EYE PAIN: 0
BREAST MASS: 0
CONSTIPATION: 0
FREQUENCY: 0
ABDOMINAL PAIN: 0
NERVOUS/ANXIOUS: 0
HEARTBURN: 0
NAUSEA: 0
ARTHRALGIAS: 0
HEADACHES: 0
COUGH: 0
JOINT SWELLING: 0
PALPITATIONS: 0
DIARRHEA: 0
DIZZINESS: 0
PARESTHESIAS: 0
SORE THROAT: 0

## 2022-05-26 ASSESSMENT — PAIN SCALES - GENERAL: PAINLEVEL: NO PAIN (0)

## 2022-05-26 ASSESSMENT — ACTIVITIES OF DAILY LIVING (ADL): CURRENT_FUNCTION: NO ASSISTANCE NEEDED

## 2022-05-26 NOTE — TELEPHONE ENCOUNTER
See my chart - message sent in incorrect my chart     Le Thomas, Registered Nurse  [facility]

## 2022-05-26 NOTE — PROGRESS NOTES
[facility]  [id number] [place] [id number]  Phone: [phone]  Primary Provider: Sepideh Isabel  Pre-op Performing Provider: SEPIDEH ISABEL      PREOPERATIVE EVALUATION:  Today's date: 5/26/2022    Angeli Jacobson is a 72 year old female who presents for a preoperative evaluation.    Surgical Information:  Surgery/Procedure: Left knee z-plasty quadricepsplasty  Surgery Location: [facility]  Surgeon: Dr. Celeste  Surgery Date: 5/31/2022  Time of Surgery: 12:45 PM  Where patient plans to recover: At home with family  Fax number for surgical facility: Note does not need to be faxed, will be available electronically in Epic.    Type of Anesthesia Anticipated: General    Assessment & Plan     The proposed surgical procedure is considered INTERMEDIATE risk.    Preop general physical exam    - CBC with platelets; Future  - Basic metabolic panel  (Ca, Cl, CO2, Creat, Gluc, K, Na, BUN); Future  - EKG 12-lead complete w/read - Clinics  - CBC with platelets  - Basic metabolic panel  (Ca, Cl, CO2, Creat, Gluc, K, Na, BUN)    Encounter for Medicare annual wellness exam      Chronic pain of left knee             Risks and Recommendations:  The patient has the following additional risks and recommendations for perioperative complications:    Medication Instructions:  Patient is to take all scheduled medications on the day of surgery   - aspirin: Discontinue aspirin 7-10 days prior to procedure to reduce bleeding risk. It should be resumed postoperatively.    - clopidrogel (Plavix), prasugrel (Effient), ticagrelor (Brilinta): No contraindication to stopping Plavix, HOLD 5-7 days before surgery.     RECOMMENDATION:  APPROVAL GIVEN to proceed with proposed procedure, without further diagnostic evaluation.        Subjective     HPI related to upcoming procedure: chronic pain of left knee      Preop Questions 5/26/2022   1. Have you ever had a heart attack or  stroke? No   2. Have you ever had surgery on your heart or blood vessels, such as a stent placement, a coronary artery bypass, or surgery on an artery in your head, neck, heart, or legs? Yes   3. Do you have chest pain with activity? No   4. Do you have a history of  heart failure? No   5. Do you currently have a cold, bronchitis or symptoms of other infection? No   6. Do you have a cough, shortness of breath, or wheezing? No   7. Do you or anyone in your family have previous history of blood clots? No   8. Do you or does anyone in your family have a serious bleeding problem such as prolonged bleeding following surgeries or cuts? No   9. Have you ever had problems with anemia or been told to take iron pills? YES - h/o anemia   10. Have you had any abnormal blood loss such as black, tarry or bloody stools, or abnormal vaginal bleeding? No   11. Have you ever had a blood transfusion? YES -    11a. Have you ever had a transfusion reaction? No   12. Are you willing to have a blood transfusion if it is medically needed before, during, or after your surgery? Yes   13. Have you or any of your relatives ever had problems with anesthesia? No   14. Do you have sleep apnea, excessive snoring or daytime drowsiness? No   14a. Do you have a CPAP machine? No   15. Do you have any artifical heart valves or other implanted medical devices like a pacemaker, defibrillator, or continuous glucose monitor? No   16. Do you have artificial joints? YES - hip and knee   17. Are you allergic to latex? No   18. Is there any chance that you may be pregnant? No     Health Care Directive:  Patient has a Health Care Directive on file      Preoperative Review of :   reviewed - no record of controlled substances prescribed.      Status of Chronic Conditions:  HYPERLIPIDEMIA - Patient has a long history of significant Hyperlipidemia requiring medication for treatment with recent good control. Patient reports no problems or side effects with the  medication.     HYPERTENSION - Patient has longstanding history of HTN , currently denies any symptoms referable to elevated blood pressure. Specifically denies chest pain, palpitations, dyspnea, orthopnea, PND or peripheral edema. Blood pressure readings have been in normal range. Current medication regimen is as listed below. Patient denies any side effects of medication.     HYPOTHYROIDISM - Patient has a longstanding history of chronic Hypothyroidism. Patient has been doing well, noting no tremor, insomnia, hair loss or changes in skin texture. Continues to take medications as directed, without adverse reactions or side effects. Last TSH   Lab Results   Component Value Date    TSH 0.59 03/02/2022   .        Review of Systems   Constitutional: Negative for chills and fever.   HENT: Positive for hearing loss. Negative for congestion, ear pain and sore throat.    Eyes: Positive for visual disturbance. Negative for pain.   Respiratory: Negative for cough and shortness of breath.    Cardiovascular: Negative for chest pain and palpitations.   Gastrointestinal: Negative for abdominal pain, constipation, diarrhea and nausea.   Genitourinary: Negative for dysuria, frequency, genital sores, hematuria, pelvic pain, urgency, vaginal bleeding and vaginal discharge.   Musculoskeletal: Negative for arthralgias, joint swelling and myalgias.   Skin: Negative for rash.   Neurological: Negative for dizziness, weakness and headaches.   Psychiatric/Behavioral: The patient is not nervous/anxious.          Patient Active Problem List    Diagnosis Date Noted     Elevated coronary artery calcium score=7/1/21 08/03/2021     Priority: Medium     Other specified injury of left quadriceps muscle, fascia and tendon, initial encounter 07/28/2021     Priority: Medium     Closed fracture of left tibia and fibula, initial encounter 12/19/2020     Priority: Medium     Closed displaced fracture of left patella, unspecified fracture morphology,  initial encounter 12/19/2020     Priority: Medium     Acute pain of left knee 10/29/2019     Priority: Medium     S/P total hip arthroplasty 10/04/2019     Priority: Medium     Myalgia of pelvic floor 09/11/2019     Priority: Medium     Lesion of bladder 09/11/2019     Priority: Medium     Postural urinary incontinence 07/17/2019     Priority: Medium     Personal history of urinary tract infection 07/17/2019     Priority: Medium     OAB (overactive bladder) 07/17/2019     Priority: Medium     S/P ORIF (open reduction internal fixation) fracture 05/02/2019     Priority: Medium     Hip pain, left 05/02/2019     Priority: Medium     History of sarcoma 01/21/2019     Priority: Medium     Femoral-popliteal bypass graft occlusion, 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     Priority: Medium     CHRONIC LEFT THIGH LYMPHEDEMA       Impaired fasting glucose 06/16/2010     Priority: Low     Hearing loss 01/29/2007      Priority: Low     Has hearing aids        Past Medical History:   Diagnosis Date     * * * SBE PROPHYLAXIS * * * 1998    Amox 500mg, take 4 tabs one hour prior to procedure.Takes this because of lymphedema secondary from leg surgey     Antiplatelet or antithrombotic long-term use      Arrhythmia      Central serous retinopathy 2001    Resolved 9/2001     CHRONIC NECK PAIN 1995     Depressive disorder      Depressive disorder, not elsewhere classified 2001     Elevated coronary artery calcium score=7/1/21 08/03/2021     Elevated coronary artery calcium score=7/1/21 08/03/2021     History of blood transfusion 04/2019 12/2020    during left hip pinning, during surgery for patella     Lateral epicondylitis      MIXED HYPERLIPIDEMIA, LDL GOAL <160 1998    LDL goal < 160     Motion sickness      MYXOID LIPOSARCOMA 2000    Left thigh, S/P excision, radiation  at [facility]     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    CHRONIC LEFT THIGH LYMPHEDEMA     Nontoxic multinodular goiter 2005    needs yearly US     Osteoporosis, unspecified 2001     Other chronic pain     fx ribs left      Other lymphedema 2000    left thigh, gets regular PT for this     Overactive bladder      PAD (peripheral artery disease) (H) 04/20/2018     PONV (postoperative nausea and vomiting)      SHINGLES 2001     Sprain of lumbosacral (joint) (ligament) 1995    right     Unspecified hearing loss 1998    chronic tinnitus     Unspecified tinnitus 1998     Past Surgical History:   Procedure Laterality Date     ARTHROPLASTY HIP Left 10/4/2019    Procedure: Removal of left femoral hardware with a conversion to left total hip arthroplasty using a Biomet Annalisa femoral stem with an OsseoTi acetabular shell and a dual mobility bearing surface;  Surgeon: Spencer Celeste MD;  Location: [facility] OR     BIOPSY  April 2000     BYPASS GRAFT FEMOROPOPLITEAL Left 1/21/2019    Procedure: LEFT FEMORAL TO ABOVE KNEE POPLITEAL  BYPASS WITH POLYTETRAFLUOROETHYLENE GRAFT;   Surgeon: Shade Owens MD;  Location: [facility] OR     COLONOSCOPY N/A 2/5/2016    Procedure: COMBINED COLONOSCOPY, SINGLE OR MULTIPLE BIOPSY/POLYPECTOMY BY BIOPSY;  Surgeon: Varun Stanley MD, MD;  Location:  GI     COLONOSCOPY N/A 12/10/2021    Procedure: COLONOSCOPY, WITH POLYPECTOMIES  USING COLD  SNARE,   CLIP APPLIED X2;  Surgeon: Dayton Luna MD;  Location:  GI     CYSTOSCOPY       EXCISION MALIG LESION>1.25CM  5/2000    Myxoid Liposarcoma       EXPLORE GROIN Right 5/1/2018    Procedure: EXPLORE GROIN;  EMERGENCY RIGHT FEMORAL EXPLORATION WITH FEMORAL ARTERY REPAIR.    EBL: 50mL;  Surgeon: Shade Owens MD;  Location: [facility] OR     HC COLP CERVIX/UPPER VAGINA  07/1997    Negative     HC DILATION/CURETTAGE DIAG/THER NON OB  02/1997    Post menopausal bleeding on HRT, negative     HIP SURGERY Left 04/13/2019     IR ANGIOGRAM THROUGH CATHETER FOLLOW UP  12/20/2018     IR ANGIOGRAM THROUGH CATHETER FOLLOW UP  12/21/2018     IR LOWER EXTREMITY ANGIOGRAM LEFT  12/19/2018     OPEN REDUCTION INTERNAL FIXATION PATELLA Left 12/21/2020    Procedure: Left partial patella fracture excision with advancement of the quadriceps tendon;  Surgeon: Stephen Rhodes MD;  Location:  OR     OPEN REDUCTION INTERNAL FIXATION RODDING INTRAMEDULLARY TIBIA Left 12/21/2020    Procedure: Open reduction intramedullary nailing of left tibia fracture;  Surgeon: Stephen Rhodes MD;  Location:  OR     REPAIR TENDON QUADRICEPS Left 7/28/2021    Procedure: Left knee quadricepsplasty with intraoperative patellar fracture requiring open reduction and internal fixation of the patella;  Surgeon: Spencer Celeste MD;  Location:  OR     VASCULAR SURGERY  04/30/2018    Left SFA stent in bypass graft     ZZC APPENDECTOMY      Appendectomy     ZZC NONSPECIFIC PROCEDURE  04/2000    Open Biopsy Left Thigh Liposarcoma     ZZHC COLONOSCOPY THRU STOMA, DIAGNOSTIC  2006    due 2010     Current Outpatient Medications   Medication  Sig Dispense Refill     aspirin (ASA) 81 MG chewable tablet Take 81 mg by mouth daily       calcium carbonate 600 mg-vitamin D 400 units (CALTRATE) 600-400 MG-UNIT per tablet Take 1 chew tab by mouth daily       clopidogrel (PLAVIX) 75 MG tablet TAKE 1 TABLET BY MOUTH EVERY DAY 90 tablet 3     econazole nitrate 1 % external cream Apply topically daily (Patient taking differently: Apply topically daily Toes) 85 g 3     evolocumab (REPATHA) 140 MG/ML prefilled autoinjector Inject 1 mL (140 mg) Subcutaneous every 14 days 6 mL 3     ezetimibe (ZETIA) 10 MG tablet Take 1 tablet (10 mg) by mouth daily 90 tablet 3     levothyroxine (SYNTHROID/LEVOTHROID) 75 MCG tablet Take 1 tablet (75 mcg) by mouth daily 90 tablet 3     multivitamin, therapeutic (THERA-VIT) TABS tablet Take 1 tablet by mouth daily       nitroFURantoin macrocrystal-monohydrate (MACROBID) 100 MG capsule TAKE 1 CAPSULE BY MOUTH AFTER INTRCOURSE 30 capsule 3     order for DME Equipment being ordered: Left Thigh High Compression Stocking, 550 CCL.3 1 each 99     ORDER FOR DME Equipment being ordered: lymphedema bandages 2 Device 1     rosuvastatin (CRESTOR) 20 MG tablet Take 1 tablet (20 mg) by mouth daily 90 tablet 3     Vitamin D3 (CHOLECALCIFEROL) 25 mcg (1000 units) tablet Take 2 tablets (50 mcg) by mouth daily 60 tablet 0       Allergies   Allergen Reactions     Celexa [Citalopram Hydrobromide]      Decreased libido        Social History     Tobacco Use     Smoking status: Never Smoker     Smokeless tobacco: Never Used   Substance Use Topics     Alcohol use: No     Family History   Problem Relation Age of Onset     C.A.D. Father         MI 57     Alcohol/Drug Father         etoh     Coronary Artery Disease Father      Obesity Mother      Osteoporosis Mother      Colon Cancer Brother 70     Hyperlipidemia Son      Anxiety Disorder Son      Hyperlipidemia Son         very high, experimental drug     C.A.D. Paternal Grandmother         ascvd     Diabetes  Maternal Grandmother      Cancer Maternal Grandmother      DANIELA. Paternal Uncle         Mi  age 48     Cancer Maternal Aunt         pancreatic CA     Hyperlipidemia Son      Hyperlipidemia Cousin      History   Drug Use No         Objective     LMP 2005     Physical Exam    GENERAL APPEARANCE: healthy, alert and no distress     EYES: EOMI, PERRL     HENT: ear canals and TM's normal and nose and mouth without ulcers or lesions     NECK: no adenopathy, no asymmetry, masses, or scars and thyroid normal to palpation     RESP: lungs clear to auscultation - no rales, rhonchi or wheezes     CV: regular rates and rhythm, normal S1 S2, no S3 or S4 and no murmur, click or rub     ABDOMEN:  soft, nontender, no HSM or masses and bowel sounds normal     SKIN: no suspicious lesions or rashes     NEURO: Normal strength and tone, sensory exam grossly normal, mentation intact and speech normal     PSYCH: mentation appears normal. and affect normal/bright     LYMPHATICS: No cervical adenopathy    Recent Labs   Lab Test 22  0859 21  0939 21  1026 21  1018   HGB  --  13.8  --  12.0   PLT  --  248  --  290     --  137  --    POTASSIUM 4.5  --  4.2  --    CR 0.68  --  0.63  --         Diagnostics:  Results for orders placed or performed in visit on 22   CBC with platelets     Status: Normal   Result Value Ref Range    WBC Count 6.3 4.0 - 11.0 10e3/uL    RBC Count 4.63 3.80 - 5.20 10e6/uL    Hemoglobin 14.0 11.7 - 15.7 g/dL    Hematocrit 43.4 35.0 - 47.0 %    MCV 94 78 - 100 fL    MCH 30.2 26.5 - 33.0 pg    MCHC 32.3 31.5 - 36.5 g/dL    RDW 13.5 10.0 - 15.0 %    Platelet Count 242 150 - 450 10e3/uL        EKG: appears normal, NSR, Right Bundle Branch Block--incomplete, unchanged from previous tracings    Revised Cardiac Risk Index (RCRI):  The patient has the following serious cardiovascular risks for perioperative complications:  PAD- 1 point     RCRI Interpretation: 1 point: Class II  "(low risk - 0.9% complication rate)           Signed Electronically by: Sepideh Burris PA-C  Copy of this evaluation report is provided to requesting physician.        SUBJECTIVE:   Angeli Jacobson is a 72 year old female who presents for Preventive Visit.    Patient has been advised of split billing requirements and indicates understanding: Yes  Are you in the first 12 months of your Medicare coverage?  No    HPI  Answers for HPI/ROS submitted by the patient on 5/26/2022  In general, how would you rate your overall physical health?: good  Frequency of exercise:: None  Do you usually eat at least 4 servings of fruit and vegetables a day, include whole grains & fiber, and avoid regularly eating high fat or \"junk\" foods? : No  Taking medications regularly:: Yes  Medication side effects:: None  Activities of Daily Living: no assistance needed  Home safety: no safety concerns identified  Hearing Impairment:: difficulty following a conversation in a noisy restaurant or crowded room  In the past 6 months, have you been bothered by leaking of urine?: Yes  Blood in stool: No  heartburn: No  peripheral edema: Yes  mood changes: No  Skin sensation changes: No  tenderness: No  breast mass: No  breast discharge: No  In general, how would you rate your overall mental or emotional health?: good  Additional concerns today:: Yes    Do you feel safe in your environment? Yes    Have you ever done Advance Care Planning? (For example, a Health Directive, POLST, or a discussion with a medical provider or your loved ones about your wishes): Yes, advance care planning is on file.     Fall risk  Fallen 2 or more times in the past year?: No  Any fall with injury in the past year?: No    Cognitive Screening   1) Repeat 3 items (Leader, Season, Table)    2) Clock draw: NORMAL  3) 3 item recall: Recalls 3 objects  Results: 3 items recalled: COGNITIVE IMPAIRMENT LESS LIKELY    Mini-CogTM Copyright S [employer]. Licensed by the author for " use in [facility]; reprinted with permission (novatoni@[facility]). All rights reserved.      Do you have sleep apnea, excessive snoring or daytime drowsiness?: no    Reviewed and updated as needed this visit by clinical staff    Reviewed and updated as needed this visit by Provider                   Social History     Tobacco Use     Smoking status: Never Smoker     Smokeless tobacco: Never Used   Substance Use Topics     Alcohol use: No     If you drink alcohol do you typically have >3 drinks per day or >7 drinks per week? No    Alcohol Use 9/9/2020   Prescreen: >3 drinks/day or >7 drinks/week? Not Applicable   Prescreen: >3 drinks/day or >7 drinks/week? -   No flowsheet data found.    Hyperlipidemia Follow-Up      Are you regularly taking any medication or supplement to lower your cholesterol?   Yes- Rosuvastatin    Are you having muscle aches or other side effects that you think could be caused by your cholesterol lowering medication?  No      Current providers sharing in care for this patient include:   Patient Care Team:  Sepideh Burris PA-C as PCP - General (Physician Assistant)  Sepideh Burris PA-C as Assigned PCP  Jennifer Oquendo MD as MD (Urology)  [facility] as Pharmacist (Pharmacist Ambulatory Care)  Jennifer Oquendo MD as Assigned Surgical Provider  Tiesha Prince MD as Assigned Heart and Vascular Provider  Irena Gilliam PA-C as Physician Assistant (Orthopaedic Surgery)  Leeann Sharma MD as Assigned Neuroscience Provider  Leeann Sharma MD as Physician (Physical Medicine and Rehabilitation)    The following health maintenance items are reviewed in Epic and correct as of today:  Health Maintenance Due   Topic Date Due     MEDICARE ANNUAL WELLNESS VISIT  09/09/2021     COVID-19 Vaccine (4 - Booster for Moderna series) 02/28/2022     Lab work is in process  Pneumonia Vaccine:For adults 65 years or older who do not have an  "immunocompromising condition, cerebrospinal fluid leak, or cochlear implant and want to receive PPSV23 ONLY: Administer 1 dose of PPSV23. Anyone who received any doses of PPSV23 before age 65 should receive 1 final dose of the vaccine at age 65 or older. Administer this last dose at least 5 years after the prior PPSV23 dose.    Review of Systems  Constitutional, HEENT, cardiovascular, pulmonary, gi and gu systems are negative, except as otherwise noted.    OBJECTIVE:   LMP 03/18/2005  Estimated body mass index is 24.23 kg/m  as calculated from the following:    Height as of 5/23/22: 1.676 m (5' 6\").    Weight as of 5/23/22: 68.1 kg (150 lb 1.6 oz).  Physical Exam  See exam above        ASSESSMENT / PLAN:       Patient has been advised of split billing requirements and indicates understanding: Yes    COUNSELING:  Reviewed preventive health counseling, as reflected in patient instructions       Regular exercise       Healthy diet/nutrition       Vision screening       Hearing screening       Fall risk prevention    Estimated body mass index is 24.23 kg/m  as calculated from the following:    Height as of 5/23/22: 1.676 m (5' 6\").    Weight as of 5/23/22: 68.1 kg (150 lb 1.6 oz).        She reports that she has never smoked. She has never used smokeless tobacco.      Appropriate preventive services were discussed with this patient, including applicable screening as appropriate for cardiovascular disease, diabetes, osteopenia/osteoporosis, and glaucoma.  As appropriate for age/gender, discussed screening for colorectal cancer, prostate cancer, breast cancer, and cervical cancer. Checklist reviewing preventive services available has been given to the patient.    Reviewed patients plan of care and provided an AVS. The Basic Care Plan (routine screening as documented in Health Maintenance) for Angeli meets the Care Plan requirement. This Care Plan has been established and reviewed with the Patient.    Counseling " Resources:  ATP IV Guidelines  Pooled Cohorts Equation Calculator  Breast Cancer Risk Calculator  Breast Cancer: Medication to Reduce Risk  FRAX Risk Assessment  ICSI Preventive Guidelines  Dietary Guidelines for Americans, 2010  USDA's MyPlate  ASA Prophylaxis  Lung CA Screening    C[facility]RTNEY [facility]    Identified Health Risks:

## 2022-05-26 NOTE — H&P (VIEW-ONLY)
[facility]  [id number] [place] [id number]  Phone: [phone]  Primary Provider: Sepideh Isabel  Pre-op Performing Provider: SEPIDEH ISABEL      PREOPERATIVE EVALUATION:  Today's date: 5/26/2022    Angeli Jacobson is a 72 year old female who presents for a preoperative evaluation.    Surgical Information:  Surgery/Procedure: Left knee z-plasty quadricepsplasty  Surgery Location: [facility]  Surgeon: Dr. Celeste  Surgery Date: 5/31/2022  Time of Surgery: 12:45 PM  Where patient plans to recover: At home with family  Fax number for surgical facility: Note does not need to be faxed, will be available electronically in Epic.    Type of Anesthesia Anticipated: General    Assessment & Plan     The proposed surgical procedure is considered INTERMEDIATE risk.    Preop general physical exam    - CBC with platelets; Future  - Basic metabolic panel  (Ca, Cl, CO2, Creat, Gluc, K, Na, BUN); Future  - EKG 12-lead complete w/read - Clinics  - CBC with platelets  - Basic metabolic panel  (Ca, Cl, CO2, Creat, Gluc, K, Na, BUN)    Encounter for Medicare annual wellness exam      Chronic pain of left knee             Risks and Recommendations:  The patient has the following additional risks and recommendations for perioperative complications:    Medication Instructions:  Patient is to take all scheduled medications on the day of surgery   - aspirin: Discontinue aspirin 7-10 days prior to procedure to reduce bleeding risk. It should be resumed postoperatively.    - clopidrogel (Plavix), prasugrel (Effient), ticagrelor (Brilinta): No contraindication to stopping Plavix, HOLD 5-7 days before surgery.     RECOMMENDATION:  APPROVAL GIVEN to proceed with proposed procedure, without further diagnostic evaluation.        Subjective     HPI related to upcoming procedure: chronic pain of left knee      Preop Questions 5/26/2022   1. Have you ever had a heart attack or  stroke? No   2. Have you ever had surgery on your heart or blood vessels, such as a stent placement, a coronary artery bypass, or surgery on an artery in your head, neck, heart, or legs? Yes   3. Do you have chest pain with activity? No   4. Do you have a history of  heart failure? No   5. Do you currently have a cold, bronchitis or symptoms of other infection? No   6. Do you have a cough, shortness of breath, or wheezing? No   7. Do you or anyone in your family have previous history of blood clots? No   8. Do you or does anyone in your family have a serious bleeding problem such as prolonged bleeding following surgeries or cuts? No   9. Have you ever had problems with anemia or been told to take iron pills? YES - h/o anemia   10. Have you had any abnormal blood loss such as black, tarry or bloody stools, or abnormal vaginal bleeding? No   11. Have you ever had a blood transfusion? YES -    11a. Have you ever had a transfusion reaction? No   12. Are you willing to have a blood transfusion if it is medically needed before, during, or after your surgery? Yes   13. Have you or any of your relatives ever had problems with anesthesia? No   14. Do you have sleep apnea, excessive snoring or daytime drowsiness? No   14a. Do you have a CPAP machine? No   15. Do you have any artifical heart valves or other implanted medical devices like a pacemaker, defibrillator, or continuous glucose monitor? No   16. Do you have artificial joints? YES - hip and knee   17. Are you allergic to latex? No   18. Is there any chance that you may be pregnant? No     Health Care Directive:  Patient has a Health Care Directive on file      Preoperative Review of :   reviewed - no record of controlled substances prescribed.      Status of Chronic Conditions:  HYPERLIPIDEMIA - Patient has a long history of significant Hyperlipidemia requiring medication for treatment with recent good control. Patient reports no problems or side effects with the  medication.     HYPERTENSION - Patient has longstanding history of HTN , currently denies any symptoms referable to elevated blood pressure. Specifically denies chest pain, palpitations, dyspnea, orthopnea, PND or peripheral edema. Blood pressure readings have been in normal range. Current medication regimen is as listed below. Patient denies any side effects of medication.     HYPOTHYROIDISM - Patient has a longstanding history of chronic Hypothyroidism. Patient has been doing well, noting no tremor, insomnia, hair loss or changes in skin texture. Continues to take medications as directed, without adverse reactions or side effects. Last TSH   Lab Results   Component Value Date    TSH 0.59 03/02/2022   .        Review of Systems   Constitutional: Negative for chills and fever.   HENT: Positive for hearing loss. Negative for congestion, ear pain and sore throat.    Eyes: Positive for visual disturbance. Negative for pain.   Respiratory: Negative for cough and shortness of breath.    Cardiovascular: Negative for chest pain and palpitations.   Gastrointestinal: Negative for abdominal pain, constipation, diarrhea and nausea.   Genitourinary: Negative for dysuria, frequency, genital sores, hematuria, pelvic pain, urgency, vaginal bleeding and vaginal discharge.   Musculoskeletal: Negative for arthralgias, joint swelling and myalgias.   Skin: Negative for rash.   Neurological: Negative for dizziness, weakness and headaches.   Psychiatric/Behavioral: The patient is not nervous/anxious.          Patient Active Problem List    Diagnosis Date Noted     Elevated coronary artery calcium score=7/1/21 08/03/2021     Priority: Medium     Other specified injury of left quadriceps muscle, fascia and tendon, initial encounter 07/28/2021     Priority: Medium     Closed fracture of left tibia and fibula, initial encounter 12/19/2020     Priority: Medium     Closed displaced fracture of left patella, unspecified fracture morphology,  initial encounter 12/19/2020     Priority: Medium     Acute pain of left knee 10/29/2019     Priority: Medium     S/P total hip arthroplasty 10/04/2019     Priority: Medium     Myalgia of pelvic floor 09/11/2019     Priority: Medium     Lesion of bladder 09/11/2019     Priority: Medium     Postural urinary incontinence 07/17/2019     Priority: Medium     Personal history of urinary tract infection 07/17/2019     Priority: Medium     OAB (overactive bladder) 07/17/2019     Priority: Medium     S/P ORIF (open reduction internal fixation) fracture 05/02/2019     Priority: Medium     Hip pain, left 05/02/2019     Priority: Medium     History of sarcoma 01/21/2019     Priority: Medium     Femoral-popliteal bypass graft occlusion, 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     Priority: Medium     CHRONIC LEFT THIGH LYMPHEDEMA       Impaired fasting glucose 06/16/2010     Priority: Low     Hearing loss 01/29/2007      Priority: Low     Has hearing aids        Past Medical History:   Diagnosis Date     * * * SBE PROPHYLAXIS * * * 1998    Amox 500mg, take 4 tabs one hour prior to procedure.Takes this because of lymphedema secondary from leg surgey     Antiplatelet or antithrombotic long-term use      Arrhythmia      Central serous retinopathy 2001    Resolved 9/2001     CHRONIC NECK PAIN 1995     Depressive disorder      Depressive disorder, not elsewhere classified 2001     Elevated coronary artery calcium score=7/1/21 08/03/2021     Elevated coronary artery calcium score=7/1/21 08/03/2021     History of blood transfusion 04/2019 12/2020    during left hip pinning, during surgery for patella     Lateral epicondylitis      MIXED HYPERLIPIDEMIA, LDL GOAL <160 1998    LDL goal < 160     Motion sickness      MYXOID LIPOSARCOMA 2000    Left thigh, S/P excision, radiation  at [facility]     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    CHRONIC LEFT THIGH LYMPHEDEMA     Nontoxic multinodular goiter 2005    needs yearly US     Osteoporosis, unspecified 2001     Other chronic pain     fx ribs left      Other lymphedema 2000    left thigh, gets regular PT for this     Overactive bladder      PAD (peripheral artery disease) (H) 04/20/2018     PONV (postoperative nausea and vomiting)      SHINGLES 2001     Sprain of lumbosacral (joint) (ligament) 1995    right     Unspecified hearing loss 1998    chronic tinnitus     Unspecified tinnitus 1998     Past Surgical History:   Procedure Laterality Date     ARTHROPLASTY HIP Left 10/4/2019    Procedure: Removal of left femoral hardware with a conversion to left total hip arthroplasty using a Biomet Annalisa femoral stem with an OsseoTi acetabular shell and a dual mobility bearing surface;  Surgeon: Spencer Celeste MD;  Location: [facility] OR     BIOPSY  April 2000     BYPASS GRAFT FEMOROPOPLITEAL Left 1/21/2019    Procedure: LEFT FEMORAL TO ABOVE KNEE POPLITEAL  BYPASS WITH POLYTETRAFLUOROETHYLENE GRAFT;   Surgeon: Shade Owens MD;  Location: [facility] OR     COLONOSCOPY N/A 2/5/2016    Procedure: COMBINED COLONOSCOPY, SINGLE OR MULTIPLE BIOPSY/POLYPECTOMY BY BIOPSY;  Surgeon: Varun Stanley MD, MD;  Location:  GI     COLONOSCOPY N/A 12/10/2021    Procedure: COLONOSCOPY, WITH POLYPECTOMIES  USING COLD  SNARE,   CLIP APPLIED X2;  Surgeon: Dayton Luna MD;  Location:  GI     CYSTOSCOPY       EXCISION MALIG LESION>1.25CM  5/2000    Myxoid Liposarcoma       EXPLORE GROIN Right 5/1/2018    Procedure: EXPLORE GROIN;  EMERGENCY RIGHT FEMORAL EXPLORATION WITH FEMORAL ARTERY REPAIR.    EBL: 50mL;  Surgeon: Shade Owens MD;  Location: [facility] OR     HC COLP CERVIX/UPPER VAGINA  07/1997    Negative     HC DILATION/CURETTAGE DIAG/THER NON OB  02/1997    Post menopausal bleeding on HRT, negative     HIP SURGERY Left 04/13/2019     IR ANGIOGRAM THROUGH CATHETER FOLLOW UP  12/20/2018     IR ANGIOGRAM THROUGH CATHETER FOLLOW UP  12/21/2018     IR LOWER EXTREMITY ANGIOGRAM LEFT  12/19/2018     OPEN REDUCTION INTERNAL FIXATION PATELLA Left 12/21/2020    Procedure: Left partial patella fracture excision with advancement of the quadriceps tendon;  Surgeon: Stephen Rhodes MD;  Location:  OR     OPEN REDUCTION INTERNAL FIXATION RODDING INTRAMEDULLARY TIBIA Left 12/21/2020    Procedure: Open reduction intramedullary nailing of left tibia fracture;  Surgeon: Stephen Rhodes MD;  Location:  OR     REPAIR TENDON QUADRICEPS Left 7/28/2021    Procedure: Left knee quadricepsplasty with intraoperative patellar fracture requiring open reduction and internal fixation of the patella;  Surgeon: Spencer Celeste MD;  Location:  OR     VASCULAR SURGERY  04/30/2018    Left SFA stent in bypass graft     ZZC APPENDECTOMY      Appendectomy     ZZC NONSPECIFIC PROCEDURE  04/2000    Open Biopsy Left Thigh Liposarcoma     ZZHC COLONOSCOPY THRU STOMA, DIAGNOSTIC  2006    due 2010     Current Outpatient Medications   Medication  Sig Dispense Refill     aspirin (ASA) 81 MG chewable tablet Take 81 mg by mouth daily       calcium carbonate 600 mg-vitamin D 400 units (CALTRATE) 600-400 MG-UNIT per tablet Take 1 chew tab by mouth daily       clopidogrel (PLAVIX) 75 MG tablet TAKE 1 TABLET BY MOUTH EVERY DAY 90 tablet 3     econazole nitrate 1 % external cream Apply topically daily (Patient taking differently: Apply topically daily Toes) 85 g 3     evolocumab (REPATHA) 140 MG/ML prefilled autoinjector Inject 1 mL (140 mg) Subcutaneous every 14 days 6 mL 3     ezetimibe (ZETIA) 10 MG tablet Take 1 tablet (10 mg) by mouth daily 90 tablet 3     levothyroxine (SYNTHROID/LEVOTHROID) 75 MCG tablet Take 1 tablet (75 mcg) by mouth daily 90 tablet 3     multivitamin, therapeutic (THERA-VIT) TABS tablet Take 1 tablet by mouth daily       nitroFURantoin macrocrystal-monohydrate (MACROBID) 100 MG capsule TAKE 1 CAPSULE BY MOUTH AFTER INTRCOURSE 30 capsule 3     order for DME Equipment being ordered: Left Thigh High Compression Stocking, 550 CCL.3 1 each 99     ORDER FOR DME Equipment being ordered: lymphedema bandages 2 Device 1     rosuvastatin (CRESTOR) 20 MG tablet Take 1 tablet (20 mg) by mouth daily 90 tablet 3     Vitamin D3 (CHOLECALCIFEROL) 25 mcg (1000 units) tablet Take 2 tablets (50 mcg) by mouth daily 60 tablet 0       Allergies   Allergen Reactions     Celexa [Citalopram Hydrobromide]      Decreased libido        Social History     Tobacco Use     Smoking status: Never Smoker     Smokeless tobacco: Never Used   Substance Use Topics     Alcohol use: No     Family History   Problem Relation Age of Onset     C.A.D. Father         MI 57     Alcohol/Drug Father         etoh     Coronary Artery Disease Father      Obesity Mother      Osteoporosis Mother      Colon Cancer Brother 70     Hyperlipidemia Son      Anxiety Disorder Son      Hyperlipidemia Son         very high, experimental drug     C.A.D. Paternal Grandmother         ascvd     Diabetes  Maternal Grandmother      Cancer Maternal Grandmother      DANIELA. Paternal Uncle         Mi  age 48     Cancer Maternal Aunt         pancreatic CA     Hyperlipidemia Son      Hyperlipidemia Cousin      History   Drug Use No         Objective     LMP 2005     Physical Exam    GENERAL APPEARANCE: healthy, alert and no distress     EYES: EOMI, PERRL     HENT: ear canals and TM's normal and nose and mouth without ulcers or lesions     NECK: no adenopathy, no asymmetry, masses, or scars and thyroid normal to palpation     RESP: lungs clear to auscultation - no rales, rhonchi or wheezes     CV: regular rates and rhythm, normal S1 S2, no S3 or S4 and no murmur, click or rub     ABDOMEN:  soft, nontender, no HSM or masses and bowel sounds normal     SKIN: no suspicious lesions or rashes     NEURO: Normal strength and tone, sensory exam grossly normal, mentation intact and speech normal     PSYCH: mentation appears normal. and affect normal/bright     LYMPHATICS: No cervical adenopathy    Recent Labs   Lab Test 22  0859 21  0939 21  1026 21  1018   HGB  --  13.8  --  12.0   PLT  --  248  --  290     --  137  --    POTASSIUM 4.5  --  4.2  --    CR 0.68  --  0.63  --         Diagnostics:  Results for orders placed or performed in visit on 22   CBC with platelets     Status: Normal   Result Value Ref Range    WBC Count 6.3 4.0 - 11.0 10e3/uL    RBC Count 4.63 3.80 - 5.20 10e6/uL    Hemoglobin 14.0 11.7 - 15.7 g/dL    Hematocrit 43.4 35.0 - 47.0 %    MCV 94 78 - 100 fL    MCH 30.2 26.5 - 33.0 pg    MCHC 32.3 31.5 - 36.5 g/dL    RDW 13.5 10.0 - 15.0 %    Platelet Count 242 150 - 450 10e3/uL        EKG: appears normal, NSR, Right Bundle Branch Block--incomplete, unchanged from previous tracings    Revised Cardiac Risk Index (RCRI):  The patient has the following serious cardiovascular risks for perioperative complications:  PAD- 1 point     RCRI Interpretation: 1 point: Class II  "(low risk - 0.9% complication rate)           Signed Electronically by: Sepideh Burris PA-C  Copy of this evaluation report is provided to requesting physician.        SUBJECTIVE:   Angeli Jacobson is a 72 year old female who presents for Preventive Visit.    Patient has been advised of split billing requirements and indicates understanding: Yes  Are you in the first 12 months of your Medicare coverage?  No    HPI  Answers for HPI/ROS submitted by the patient on 5/26/2022  In general, how would you rate your overall physical health?: good  Frequency of exercise:: None  Do you usually eat at least 4 servings of fruit and vegetables a day, include whole grains & fiber, and avoid regularly eating high fat or \"junk\" foods? : No  Taking medications regularly:: Yes  Medication side effects:: None  Activities of Daily Living: no assistance needed  Home safety: no safety concerns identified  Hearing Impairment:: difficulty following a conversation in a noisy restaurant or crowded room  In the past 6 months, have you been bothered by leaking of urine?: Yes  Blood in stool: No  heartburn: No  peripheral edema: Yes  mood changes: No  Skin sensation changes: No  tenderness: No  breast mass: No  breast discharge: No  In general, how would you rate your overall mental or emotional health?: good  Additional concerns today:: Yes    Do you feel safe in your environment? Yes    Have you ever done Advance Care Planning? (For example, a Health Directive, POLST, or a discussion with a medical provider or your loved ones about your wishes): Yes, advance care planning is on file.     Fall risk  Fallen 2 or more times in the past year?: No  Any fall with injury in the past year?: No    Cognitive Screening   1) Repeat 3 items (Leader, Season, Table)    2) Clock draw: NORMAL  3) 3 item recall: Recalls 3 objects  Results: 3 items recalled: COGNITIVE IMPAIRMENT LESS LIKELY    Mini-CogTM Copyright S [employer]. Licensed by the author for " use in [facility]; reprinted with permission (novatoni@[facility]). All rights reserved.      Do you have sleep apnea, excessive snoring or daytime drowsiness?: no    Reviewed and updated as needed this visit by clinical staff    Reviewed and updated as needed this visit by Provider                   Social History     Tobacco Use     Smoking status: Never Smoker     Smokeless tobacco: Never Used   Substance Use Topics     Alcohol use: No     If you drink alcohol do you typically have >3 drinks per day or >7 drinks per week? No    Alcohol Use 9/9/2020   Prescreen: >3 drinks/day or >7 drinks/week? Not Applicable   Prescreen: >3 drinks/day or >7 drinks/week? -   No flowsheet data found.    Hyperlipidemia Follow-Up      Are you regularly taking any medication or supplement to lower your cholesterol?   Yes- Rosuvastatin    Are you having muscle aches or other side effects that you think could be caused by your cholesterol lowering medication?  No      Current providers sharing in care for this patient include:   Patient Care Team:  Sepideh Burris PA-C as PCP - General (Physician Assistant)  Sepideh Burris PA-C as Assigned PCP  Jennifer Oquendo MD as MD (Urology)  [facility] as Pharmacist (Pharmacist Ambulatory Care)  Jennifer Oquendo MD as Assigned Surgical Provider  Tiesha Prince MD as Assigned Heart and Vascular Provider  Irena Gilliam PA-C as Physician Assistant (Orthopaedic Surgery)  Leeann Sharma MD as Assigned Neuroscience Provider  Leeann Sharma MD as Physician (Physical Medicine and Rehabilitation)    The following health maintenance items are reviewed in Epic and correct as of today:  Health Maintenance Due   Topic Date Due     MEDICARE ANNUAL WELLNESS VISIT  09/09/2021     COVID-19 Vaccine (4 - Booster for Moderna series) 02/28/2022     Lab work is in process  Pneumonia Vaccine:For adults 65 years or older who do not have an  "immunocompromising condition, cerebrospinal fluid leak, or cochlear implant and want to receive PPSV23 ONLY: Administer 1 dose of PPSV23. Anyone who received any doses of PPSV23 before age 65 should receive 1 final dose of the vaccine at age 65 or older. Administer this last dose at least 5 years after the prior PPSV23 dose.    Review of Systems  Constitutional, HEENT, cardiovascular, pulmonary, gi and gu systems are negative, except as otherwise noted.    OBJECTIVE:   LMP 03/18/2005  Estimated body mass index is 24.23 kg/m  as calculated from the following:    Height as of 5/23/22: 1.676 m (5' 6\").    Weight as of 5/23/22: 68.1 kg (150 lb 1.6 oz).  Physical Exam  See exam above        ASSESSMENT / PLAN:       Patient has been advised of split billing requirements and indicates understanding: Yes    COUNSELING:  Reviewed preventive health counseling, as reflected in patient instructions       Regular exercise       Healthy diet/nutrition       Vision screening       Hearing screening       Fall risk prevention    Estimated body mass index is 24.23 kg/m  as calculated from the following:    Height as of 5/23/22: 1.676 m (5' 6\").    Weight as of 5/23/22: 68.1 kg (150 lb 1.6 oz).        She reports that she has never smoked. She has never used smokeless tobacco.      Appropriate preventive services were discussed with this patient, including applicable screening as appropriate for cardiovascular disease, diabetes, osteopenia/osteoporosis, and glaucoma.  As appropriate for age/gender, discussed screening for colorectal cancer, prostate cancer, breast cancer, and cervical cancer. Checklist reviewing preventive services available has been given to the patient.    Reviewed patients plan of care and provided an AVS. The Basic Care Plan (routine screening as documented in Health Maintenance) for Angeli meets the Care Plan requirement. This Care Plan has been established and reviewed with the Patient.    Counseling " Resources:  ATP IV Guidelines  Pooled Cohorts Equation Calculator  Breast Cancer Risk Calculator  Breast Cancer: Medication to Reduce Risk  FRAX Risk Assessment  ICSI Preventive Guidelines  Dietary Guidelines for Americans, 2010  USDA's MyPlate  ASA Prophylaxis  Lung CA Screening    COURTNEY Bello [facility]    Identified Health Risks:

## 2022-05-26 NOTE — PATIENT INSTRUCTIONS
Patient Education   Personalized Prevention Plan  You are due for the preventive services outlined below.  Your care team is available to assist you in scheduling these services.  If you have already completed any of these items, please share that information with your care team to update in your medical record.  Health Maintenance Due   Topic Date Due     Annual Wellness Visit  09/09/2021     COVID-19 Vaccine (4 - Booster for Moderna series) 02/28/2022

## 2022-05-27 ENCOUNTER — LAB (OUTPATIENT)
Dept: LAB | Facility: CLINIC | Age: 72
End: 2022-05-27
Attending: ORTHOPAEDIC SURGERY
Payer: COMMERCIAL

## 2022-05-27 ENCOUNTER — HOSPITAL ENCOUNTER (OUTPATIENT)
Dept: PHYSICAL THERAPY | Facility: CLINIC | Age: 72
Discharge: HOME | End: 2022-05-27

## 2022-05-27 DIAGNOSIS — T82.898S OCCLUSION OF LEFT FEMOROPOPLITEAL BYPASS GRAFT, SEQUELA: ICD-10-CM

## 2022-05-27 DIAGNOSIS — L90.5 SCAR CONDITION AND FIBROSIS OF SKIN: ICD-10-CM

## 2022-05-27 DIAGNOSIS — Z96.649 STATUS POST TOTAL REPLACEMENT OF HIP, UNSPECIFIED LATERALITY: ICD-10-CM

## 2022-05-27 DIAGNOSIS — M79.662 PAIN OF LEFT LOWER LEG: ICD-10-CM

## 2022-05-27 DIAGNOSIS — I89.0 LYMPHEDEMA: Primary | ICD-10-CM

## 2022-05-27 DIAGNOSIS — M25.552 HIP PAIN, LEFT: ICD-10-CM

## 2022-05-27 DIAGNOSIS — I89.0 LYMPHEDEMA OF LEFT LEG: ICD-10-CM

## 2022-05-27 DIAGNOSIS — Z87.81 S/P ORIF (OPEN REDUCTION INTERNAL FIXATION) FRACTURE: ICD-10-CM

## 2022-05-27 DIAGNOSIS — Z11.59 ENCOUNTER FOR SCREENING FOR OTHER VIRAL DISEASES: ICD-10-CM

## 2022-05-27 DIAGNOSIS — Z98.890 S/P ORIF (OPEN REDUCTION INTERNAL FIXATION) FRACTURE: ICD-10-CM

## 2022-05-27 LAB
ANION GAP SERPL CALCULATED.3IONS-SCNC: 4 MMOL/L (ref 3–14)
BUN SERPL-MCNC: 17 MG/DL (ref 7–30)
CALCIUM SERPL-MCNC: 10 MG/DL (ref 8.5–10.1)
CHLORIDE BLD-SCNC: 106 MMOL/L (ref 94–109)
CO2 SERPL-SCNC: 29 MMOL/L (ref 20–32)
CREAT SERPL-MCNC: 0.62 MG/DL (ref 0.52–1.04)
GFR SERPL CREATININE-BSD FRML MDRD: >90 ML/MIN/1.73M2
GLUCOSE BLD-MCNC: 107 MG/DL (ref 70–99)
POTASSIUM BLD-SCNC: 3.9 MMOL/L (ref 3.4–5.3)
SODIUM SERPL-SCNC: 139 MMOL/L (ref 133–144)

## 2022-05-27 PROCEDURE — U0005 INFEC AGEN DETEC AMPLI PROBE: HCPCS

## 2022-05-27 PROCEDURE — 97140 MANUAL THERAPY 1/> REGIONS: CPT | Mod: GP | Performed by: PHYSICAL THERAPIST

## 2022-05-27 PROCEDURE — 97110 THERAPEUTIC EXERCISES: CPT | Mod: GP | Performed by: PHYSICAL THERAPIST

## 2022-05-27 PROCEDURE — U0003 INFECTIOUS AGENT DETECTION BY NUCLEIC ACID (DNA OR RNA); SEVERE ACUTE RESPIRATORY SYNDROME CORONAVIRUS 2 (SARS-COV-2) (CORONAVIRUS DISEASE [COVID-19]), AMPLIFIED PROBE TECHNIQUE, MAKING USE OF HIGH THROUGHPUT TECHNOLOGIES AS DESCRIBED BY CMS-2020-01-R: HCPCS

## 2022-05-27 NOTE — PROGRESS NOTES
[facility]    Outpatient Physical Therapy Discharge Note  Patient: Angeli Jacobson  : 1950    Beginning/End Dates of Reporting Period:  2022 to 2022; 6 visits total    Therapy Diagnosis: L LE lymphedema, fibrosis     Client Self Report: feeling confident in home program    Objective Measurements:  Objective Measure: Edema   Details: 1+ pretibial pitting; rubbery fibrosis of L lower leg and medial / lateral knee; significant fibrosis of L groin scar  Objective Measure: volume L LE  Details: to 70cm; 7031ml (decreaesd 8%)  Objective Measure: ROM L Knee  Details: seated 60 (increased 5 degrees)         Outcome Measures (most recent score):  Lymphedema Life Impact Scale (score range 0-72). A higher score indicates greater impairment.: 23 (decreased 6 points)    Goals:  Goal Identifier Home program   Goal Description Patient independent in upgraded home program to manage condition of lymphedema   Target Date 22   Date Met  22   Progress (detail required for progress note): MET     Goal Identifier LLIS   Goal Description Decrease score of LLIS by 5 points 24 or less to demonstrate decreased impact of lymphedema on function   Target Date 22   Date Met  22   Progress (detail required for progress note): MET           Plan:  Discharge from therapy.    Discharge:    Reason for Discharge: Patient has met all goals.      Discharge Plan: Patient to continue home program.

## 2022-05-28 ENCOUNTER — MYC MEDICAL ADVICE (OUTPATIENT)
Dept: FAMILY MEDICINE | Facility: CLINIC | Age: 72
End: 2022-05-28
Payer: COMMERCIAL

## 2022-05-28 LAB — SARS-COV-2 RNA RESP QL NAA+PROBE: NEGATIVE

## 2022-05-31 ENCOUNTER — HOSPITAL ENCOUNTER (OUTPATIENT)
Facility: CLINIC | Age: 72
Discharge: HOME | End: 2022-06-01
Attending: ORTHOPAEDIC SURGERY | Admitting: ORTHOPAEDIC SURGERY
Payer: COMMERCIAL

## 2022-05-31 ENCOUNTER — ANESTHESIA (OUTPATIENT)
Dept: SURGERY | Facility: CLINIC | Age: 72
End: 2022-05-31
Payer: COMMERCIAL

## 2022-05-31 ENCOUNTER — ANESTHESIA EVENT (OUTPATIENT)
Dept: SURGERY | Facility: CLINIC | Age: 72
End: 2022-05-31
Payer: COMMERCIAL

## 2022-05-31 ENCOUNTER — APPOINTMENT (OUTPATIENT)
Dept: GENERAL RADIOLOGY | Facility: CLINIC | Age: 72
End: 2022-05-31
Attending: ORTHOPAEDIC SURGERY
Payer: COMMERCIAL

## 2022-05-31 DIAGNOSIS — Z98.890 S/P LEFT KNEE SURGERY: ICD-10-CM

## 2022-05-31 DIAGNOSIS — M24.662 ARTHROFIBROSIS OF KNEE JOINT, LEFT: Primary | ICD-10-CM

## 2022-05-31 LAB
CREAT SERPL-MCNC: 0.58 MG/DL (ref 0.52–1.04)
GFR SERPL CREATININE-BSD FRML MDRD: >90 ML/MIN/1.73M2

## 2022-05-31 PROCEDURE — 999N000065 XR KNEE PORT LEFT 1/2 VIEWS: Mod: LT

## 2022-05-31 PROCEDURE — 258N000003 HC RX IP 258 OP 636: Performed by: NURSE ANESTHETIST, CERTIFIED REGISTERED

## 2022-05-31 PROCEDURE — 999N000141 HC STATISTIC PRE-PROCEDURE NURSING ASSESSMENT: Performed by: ORTHOPAEDIC SURGERY

## 2022-05-31 PROCEDURE — 272N000001 HC OR GENERAL SUPPLY STERILE: Performed by: ORTHOPAEDIC SURGERY

## 2022-05-31 PROCEDURE — 250N000011 HC RX IP 250 OP 636: Performed by: ANESTHESIOLOGY

## 2022-05-31 PROCEDURE — 250N000011 HC RX IP 250 OP 636: Performed by: PHYSICIAN ASSISTANT

## 2022-05-31 PROCEDURE — 250N000011 HC RX IP 250 OP 636: Performed by: NURSE ANESTHETIST, CERTIFIED REGISTERED

## 2022-05-31 PROCEDURE — 370N000017 HC ANESTHESIA TECHNICAL FEE, PER MIN: Performed by: ORTHOPAEDIC SURGERY

## 2022-05-31 PROCEDURE — 258N000003 HC RX IP 258 OP 636: Performed by: ANESTHESIOLOGY

## 2022-05-31 PROCEDURE — 710N000009 HC RECOVERY PHASE 1, LEVEL 1, PER MIN: Performed by: ORTHOPAEDIC SURGERY

## 2022-05-31 PROCEDURE — C1762 CONN TISS, HUMAN(INC FASCIA): HCPCS | Performed by: ORTHOPAEDIC SURGERY

## 2022-05-31 PROCEDURE — 250N000011 HC RX IP 250 OP 636: Performed by: ORTHOPAEDIC SURGERY

## 2022-05-31 PROCEDURE — 36415 COLL VENOUS BLD VENIPUNCTURE: CPT | Performed by: ORTHOPAEDIC SURGERY

## 2022-05-31 PROCEDURE — 360N000083 HC SURGERY LEVEL 3 W/ FLUORO, PER MIN: Performed by: ORTHOPAEDIC SURGERY

## 2022-05-31 PROCEDURE — 258N000003 HC RX IP 258 OP 636: Performed by: ORTHOPAEDIC SURGERY

## 2022-05-31 PROCEDURE — 250N000009 HC RX 250: Performed by: ANESTHESIOLOGY

## 2022-05-31 PROCEDURE — 250N000009 HC RX 250: Performed by: ORTHOPAEDIC SURGERY

## 2022-05-31 PROCEDURE — 82565 ASSAY OF CREATININE: CPT | Performed by: ORTHOPAEDIC SURGERY

## 2022-05-31 PROCEDURE — 250N000013 HC RX MED GY IP 250 OP 250 PS 637: Performed by: PHYSICIAN ASSISTANT

## 2022-05-31 PROCEDURE — 250N000009 HC RX 250: Performed by: NURSE ANESTHETIST, CERTIFIED REGISTERED

## 2022-05-31 DEVICE — GRAFT ACHILLES TENDON W/CALCANEUS 19.5CM 430200: Type: IMPLANTABLE DEVICE | Site: LEG | Status: FUNCTIONAL

## 2022-05-31 RX ORDER — TRANEXAMIC ACID 650 MG/1
1950 TABLET ORAL ONCE
Status: COMPLETED | OUTPATIENT
Start: 2022-05-31 | End: 2022-05-31

## 2022-05-31 RX ORDER — HYDRALAZINE HYDROCHLORIDE 20 MG/ML
2.5-5 INJECTION INTRAMUSCULAR; INTRAVENOUS EVERY 10 MIN PRN
Status: DISCONTINUED | OUTPATIENT
Start: 2022-05-31 | End: 2022-05-31 | Stop reason: HOSPADM

## 2022-05-31 RX ORDER — SOLIFENACIN SUCCINATE 5 MG/1
5 TABLET, FILM COATED ORAL DAILY
COMMUNITY
End: 2022-06-08

## 2022-05-31 RX ORDER — MAGNESIUM HYDROXIDE 1200 MG/15ML
LIQUID ORAL PRN
Status: DISCONTINUED | OUTPATIENT
Start: 2022-05-31 | End: 2022-05-31 | Stop reason: HOSPADM

## 2022-05-31 RX ORDER — LIDOCAINE 40 MG/G
CREAM TOPICAL
Status: DISCONTINUED | OUTPATIENT
Start: 2022-05-31 | End: 2022-05-31 | Stop reason: HOSPADM

## 2022-05-31 RX ORDER — SODIUM CHLORIDE, SODIUM LACTATE, POTASSIUM CHLORIDE, CALCIUM CHLORIDE 600; 310; 30; 20 MG/100ML; MG/100ML; MG/100ML; MG/100ML
INJECTION, SOLUTION INTRAVENOUS CONTINUOUS
Status: DISCONTINUED | OUTPATIENT
Start: 2022-05-31 | End: 2022-05-31 | Stop reason: HOSPADM

## 2022-05-31 RX ORDER — POLYETHYLENE GLYCOL 3350 17 G/17G
17 POWDER, FOR SOLUTION ORAL DAILY
Status: DISCONTINUED | OUTPATIENT
Start: 2022-06-01 | End: 2022-06-01 | Stop reason: HOSPADM

## 2022-05-31 RX ORDER — LIDOCAINE HYDROCHLORIDE 10 MG/ML
INJECTION, SOLUTION EPIDURAL; INFILTRATION; INTRACAUDAL; PERINEURAL PRN
Status: DISCONTINUED | OUTPATIENT
Start: 2022-05-31 | End: 2022-05-31

## 2022-05-31 RX ORDER — HYDROXYZINE HYDROCHLORIDE 10 MG/1
10 TABLET, FILM COATED ORAL EVERY 6 HOURS PRN
Status: DISCONTINUED | OUTPATIENT
Start: 2022-05-31 | End: 2022-06-01 | Stop reason: HOSPADM

## 2022-05-31 RX ORDER — TOLTERODINE 2 MG/1
2 CAPSULE, EXTENDED RELEASE ORAL DAILY
Status: DISCONTINUED | OUTPATIENT
Start: 2022-06-01 | End: 2022-06-01 | Stop reason: HOSPADM

## 2022-05-31 RX ORDER — OXYCODONE HYDROCHLORIDE 5 MG/1
10 TABLET ORAL EVERY 4 HOURS PRN
Status: DISCONTINUED | OUTPATIENT
Start: 2022-05-31 | End: 2022-06-01 | Stop reason: HOSPADM

## 2022-05-31 RX ORDER — ACETAMINOPHEN 325 MG/1
650 TABLET ORAL EVERY 4 HOURS PRN
Status: DISCONTINUED | OUTPATIENT
Start: 2022-06-03 | End: 2022-06-01 | Stop reason: HOSPADM

## 2022-05-31 RX ORDER — HYDROMORPHONE HCL IN WATER/PF 6 MG/30 ML
0.4 PATIENT CONTROLLED ANALGESIA SYRINGE INTRAVENOUS EVERY 5 MIN PRN
Status: DISCONTINUED | OUTPATIENT
Start: 2022-05-31 | End: 2022-05-31 | Stop reason: HOSPADM

## 2022-05-31 RX ORDER — LABETALOL HYDROCHLORIDE 5 MG/ML
10 INJECTION, SOLUTION INTRAVENOUS
Status: DISCONTINUED | OUTPATIENT
Start: 2022-05-31 | End: 2022-05-31 | Stop reason: HOSPADM

## 2022-05-31 RX ORDER — OXYCODONE HYDROCHLORIDE 5 MG/1
5 TABLET ORAL EVERY 4 HOURS PRN
Status: DISCONTINUED | OUTPATIENT
Start: 2022-05-31 | End: 2022-06-01 | Stop reason: HOSPADM

## 2022-05-31 RX ORDER — OXYCODONE HYDROCHLORIDE 5 MG/1
5 TABLET ORAL EVERY 4 HOURS PRN
Status: DISCONTINUED | OUTPATIENT
Start: 2022-05-31 | End: 2022-05-31 | Stop reason: HOSPADM

## 2022-05-31 RX ORDER — BUPIVACAINE HYDROCHLORIDE AND EPINEPHRINE 2.5; 5 MG/ML; UG/ML
INJECTION, SOLUTION INFILTRATION; PERINEURAL
Status: COMPLETED | OUTPATIENT
Start: 2022-05-31 | End: 2022-05-31

## 2022-05-31 RX ORDER — ACETAMINOPHEN 325 MG/1
975 TABLET ORAL EVERY 8 HOURS
Status: DISCONTINUED | OUTPATIENT
Start: 2022-05-31 | End: 2022-06-01 | Stop reason: HOSPADM

## 2022-05-31 RX ORDER — AMOXICILLIN 250 MG
1 CAPSULE ORAL 2 TIMES DAILY
Status: DISCONTINUED | OUTPATIENT
Start: 2022-05-31 | End: 2022-06-01 | Stop reason: HOSPADM

## 2022-05-31 RX ORDER — CEFAZOLIN SODIUM/WATER 2 G/20 ML
2 SYRINGE (ML) INTRAVENOUS
Status: DISCONTINUED | OUTPATIENT
Start: 2022-05-31 | End: 2022-05-31 | Stop reason: HOSPADM

## 2022-05-31 RX ORDER — SODIUM CHLORIDE, SODIUM LACTATE, POTASSIUM CHLORIDE, CALCIUM CHLORIDE 600; 310; 30; 20 MG/100ML; MG/100ML; MG/100ML; MG/100ML
INJECTION, SOLUTION INTRAVENOUS CONTINUOUS
Status: DISCONTINUED | OUTPATIENT
Start: 2022-05-31 | End: 2022-06-01 | Stop reason: HOSPADM

## 2022-05-31 RX ORDER — NALOXONE HYDROCHLORIDE 0.4 MG/ML
0.2 INJECTION, SOLUTION INTRAMUSCULAR; INTRAVENOUS; SUBCUTANEOUS
Status: DISCONTINUED | OUTPATIENT
Start: 2022-05-31 | End: 2022-06-01 | Stop reason: HOSPADM

## 2022-05-31 RX ORDER — ROSUVASTATIN CALCIUM 20 MG/1
20 TABLET, COATED ORAL EVERY EVENING
Status: DISCONTINUED | OUTPATIENT
Start: 2022-05-31 | End: 2022-06-01 | Stop reason: HOSPADM

## 2022-05-31 RX ORDER — ONDANSETRON 2 MG/ML
4 INJECTION INTRAMUSCULAR; INTRAVENOUS EVERY 30 MIN PRN
Status: DISCONTINUED | OUTPATIENT
Start: 2022-05-31 | End: 2022-05-31 | Stop reason: HOSPADM

## 2022-05-31 RX ORDER — DEXAMETHASONE SODIUM PHOSPHATE 4 MG/ML
INJECTION, SOLUTION INTRA-ARTICULAR; INTRALESIONAL; INTRAMUSCULAR; INTRAVENOUS; SOFT TISSUE PRN
Status: DISCONTINUED | OUTPATIENT
Start: 2022-05-31 | End: 2022-05-31

## 2022-05-31 RX ORDER — CELECOXIB 200 MG/1
400 CAPSULE ORAL ONCE
Status: COMPLETED | OUTPATIENT
Start: 2022-05-31 | End: 2022-05-31

## 2022-05-31 RX ORDER — BISACODYL 10 MG
10 SUPPOSITORY, RECTAL RECTAL DAILY PRN
Status: DISCONTINUED | OUTPATIENT
Start: 2022-05-31 | End: 2022-06-01 | Stop reason: HOSPADM

## 2022-05-31 RX ORDER — ONDANSETRON 4 MG/1
4 TABLET, ORALLY DISINTEGRATING ORAL EVERY 30 MIN PRN
Status: DISCONTINUED | OUTPATIENT
Start: 2022-05-31 | End: 2022-05-31 | Stop reason: HOSPADM

## 2022-05-31 RX ORDER — PROPOFOL 10 MG/ML
INJECTION, EMULSION INTRAVENOUS PRN
Status: DISCONTINUED | OUTPATIENT
Start: 2022-05-31 | End: 2022-05-31

## 2022-05-31 RX ORDER — LIDOCAINE 40 MG/G
CREAM TOPICAL
Status: DISCONTINUED | OUTPATIENT
Start: 2022-05-31 | End: 2022-06-01 | Stop reason: HOSPADM

## 2022-05-31 RX ORDER — CEFAZOLIN SODIUM/WATER 2 G/20 ML
2 SYRINGE (ML) INTRAVENOUS SEE ADMIN INSTRUCTIONS
Status: DISCONTINUED | OUTPATIENT
Start: 2022-05-31 | End: 2022-05-31 | Stop reason: HOSPADM

## 2022-05-31 RX ORDER — FENTANYL CITRATE 50 UG/ML
INJECTION, SOLUTION INTRAMUSCULAR; INTRAVENOUS PRN
Status: DISCONTINUED | OUTPATIENT
Start: 2022-05-31 | End: 2022-05-31

## 2022-05-31 RX ORDER — FAMOTIDINE 20 MG/1
20 TABLET, FILM COATED ORAL 2 TIMES DAILY
Status: DISCONTINUED | OUTPATIENT
Start: 2022-05-31 | End: 2022-06-01 | Stop reason: HOSPADM

## 2022-05-31 RX ORDER — NALOXONE HYDROCHLORIDE 0.4 MG/ML
0.4 INJECTION, SOLUTION INTRAMUSCULAR; INTRAVENOUS; SUBCUTANEOUS
Status: DISCONTINUED | OUTPATIENT
Start: 2022-05-31 | End: 2022-06-01 | Stop reason: HOSPADM

## 2022-05-31 RX ORDER — PROCHLORPERAZINE MALEATE 5 MG
5 TABLET ORAL EVERY 6 HOURS PRN
Status: DISCONTINUED | OUTPATIENT
Start: 2022-05-31 | End: 2022-06-01 | Stop reason: HOSPADM

## 2022-05-31 RX ORDER — ACETAMINOPHEN 325 MG/1
650 TABLET ORAL EVERY 4 HOURS PRN
Qty: 100 TABLET | Refills: 0 | Status: SHIPPED | OUTPATIENT
Start: 2022-05-31 | End: 2022-07-18

## 2022-05-31 RX ORDER — CELECOXIB 100 MG/1
100 CAPSULE ORAL 2 TIMES DAILY
Status: DISCONTINUED | OUTPATIENT
Start: 2022-05-31 | End: 2022-06-01 | Stop reason: HOSPADM

## 2022-05-31 RX ORDER — AMOXICILLIN 250 MG
1-2 CAPSULE ORAL 2 TIMES DAILY
Qty: 30 TABLET | Refills: 0 | Status: SHIPPED | OUTPATIENT
Start: 2022-05-31 | End: 2022-07-18

## 2022-05-31 RX ORDER — ONDANSETRON 2 MG/ML
4 INJECTION INTRAMUSCULAR; INTRAVENOUS EVERY 6 HOURS PRN
Status: DISCONTINUED | OUTPATIENT
Start: 2022-05-31 | End: 2022-06-01 | Stop reason: HOSPADM

## 2022-05-31 RX ORDER — OXYCODONE HYDROCHLORIDE 5 MG/1
5-10 TABLET ORAL EVERY 4 HOURS PRN
Qty: 20 TABLET | Refills: 0 | Status: SHIPPED | OUTPATIENT
Start: 2022-05-31 | End: 2022-07-18

## 2022-05-31 RX ORDER — ONDANSETRON 4 MG/1
4 TABLET, ORALLY DISINTEGRATING ORAL EVERY 6 HOURS PRN
Status: DISCONTINUED | OUTPATIENT
Start: 2022-05-31 | End: 2022-06-01 | Stop reason: HOSPADM

## 2022-05-31 RX ORDER — PROPOFOL 10 MG/ML
INJECTION, EMULSION INTRAVENOUS CONTINUOUS PRN
Status: DISCONTINUED | OUTPATIENT
Start: 2022-05-31 | End: 2022-05-31

## 2022-05-31 RX ORDER — ALBUTEROL SULFATE 0.83 MG/ML
2.5 SOLUTION RESPIRATORY (INHALATION) EVERY 4 HOURS PRN
Status: DISCONTINUED | OUTPATIENT
Start: 2022-05-31 | End: 2022-05-31 | Stop reason: HOSPADM

## 2022-05-31 RX ORDER — HYDROMORPHONE HCL IN WATER/PF 6 MG/30 ML
0.2 PATIENT CONTROLLED ANALGESIA SYRINGE INTRAVENOUS
Status: DISCONTINUED | OUTPATIENT
Start: 2022-05-31 | End: 2022-06-01 | Stop reason: HOSPADM

## 2022-05-31 RX ORDER — CEFAZOLIN SODIUM 1 G/3ML
1 INJECTION, POWDER, FOR SOLUTION INTRAMUSCULAR; INTRAVENOUS EVERY 8 HOURS
Status: COMPLETED | OUTPATIENT
Start: 2022-05-31 | End: 2022-06-01

## 2022-05-31 RX ORDER — ONDANSETRON 2 MG/ML
INJECTION INTRAMUSCULAR; INTRAVENOUS PRN
Status: DISCONTINUED | OUTPATIENT
Start: 2022-05-31 | End: 2022-05-31

## 2022-05-31 RX ORDER — ENOXAPARIN SODIUM 100 MG/ML
40 INJECTION SUBCUTANEOUS EVERY 24 HOURS
Status: DISCONTINUED | OUTPATIENT
Start: 2022-06-01 | End: 2022-06-01 | Stop reason: HOSPADM

## 2022-05-31 RX ORDER — FENTANYL CITRATE 50 UG/ML
50 INJECTION, SOLUTION INTRAMUSCULAR; INTRAVENOUS EVERY 5 MIN PRN
Status: DISCONTINUED | OUTPATIENT
Start: 2022-05-31 | End: 2022-05-31 | Stop reason: HOSPADM

## 2022-05-31 RX ORDER — HYDROMORPHONE HCL IN WATER/PF 6 MG/30 ML
0.4 PATIENT CONTROLLED ANALGESIA SYRINGE INTRAVENOUS
Status: DISCONTINUED | OUTPATIENT
Start: 2022-05-31 | End: 2022-06-01 | Stop reason: HOSPADM

## 2022-05-31 RX ORDER — LEVOTHYROXINE SODIUM 75 UG/1
75 TABLET ORAL DAILY
Status: DISCONTINUED | OUTPATIENT
Start: 2022-06-01 | End: 2022-06-01 | Stop reason: HOSPADM

## 2022-05-31 RX ADMIN — DEXAMETHASONE SODIUM PHOSPHATE 4 MG: 4 INJECTION, SOLUTION INTRA-ARTICULAR; INTRALESIONAL; INTRAMUSCULAR; INTRAVENOUS; SOFT TISSUE at 12:54

## 2022-05-31 RX ADMIN — HYDROMORPHONE HYDROCHLORIDE 0.5 MG: 1 INJECTION, SOLUTION INTRAMUSCULAR; INTRAVENOUS; SUBCUTANEOUS at 13:24

## 2022-05-31 RX ADMIN — SODIUM CHLORIDE, POTASSIUM CHLORIDE, SODIUM LACTATE AND CALCIUM CHLORIDE: 600; 310; 30; 20 INJECTION, SOLUTION INTRAVENOUS at 19:43

## 2022-05-31 RX ADMIN — SODIUM CHLORIDE, POTASSIUM CHLORIDE, SODIUM LACTATE AND CALCIUM CHLORIDE: 600; 310; 30; 20 INJECTION, SOLUTION INTRAVENOUS at 12:51

## 2022-05-31 RX ADMIN — Medication 2 G: at 12:50

## 2022-05-31 RX ADMIN — ONDANSETRON 4 MG: 2 INJECTION INTRAMUSCULAR; INTRAVENOUS at 19:43

## 2022-05-31 RX ADMIN — HYDROMORPHONE HYDROCHLORIDE 0.5 MG: 1 INJECTION, SOLUTION INTRAMUSCULAR; INTRAVENOUS; SUBCUTANEOUS at 13:17

## 2022-05-31 RX ADMIN — PROPOFOL 200 MG: 10 INJECTION, EMULSION INTRAVENOUS at 12:54

## 2022-05-31 RX ADMIN — PHENYLEPHRINE HYDROCHLORIDE 100 MCG: 10 INJECTION INTRAVENOUS at 13:09

## 2022-05-31 RX ADMIN — TRANEXAMIC ACID 1950 MG: 650 TABLET ORAL at 11:26

## 2022-05-31 RX ADMIN — ONDANSETRON HYDROCHLORIDE 4 MG: 2 INJECTION, SOLUTION INTRAVENOUS at 14:14

## 2022-05-31 RX ADMIN — CEFAZOLIN 1 G: 1 INJECTION, POWDER, FOR SOLUTION INTRAMUSCULAR; INTRAVENOUS at 20:45

## 2022-05-31 RX ADMIN — CELECOXIB 400 MG: 200 CAPSULE ORAL at 11:26

## 2022-05-31 RX ADMIN — PHENYLEPHRINE HYDROCHLORIDE 100 MCG: 10 INJECTION INTRAVENOUS at 13:48

## 2022-05-31 RX ADMIN — LIDOCAINE HYDROCHLORIDE 5 MG: 10 INJECTION, SOLUTION EPIDURAL; INFILTRATION; INTRACAUDAL; PERINEURAL at 12:54

## 2022-05-31 RX ADMIN — PHENYLEPHRINE HYDROCHLORIDE 100 MCG: 10 INJECTION INTRAVENOUS at 13:41

## 2022-05-31 RX ADMIN — PHENYLEPHRINE HYDROCHLORIDE 100 MCG: 10 INJECTION INTRAVENOUS at 13:29

## 2022-05-31 RX ADMIN — PROPOFOL 50 MCG/KG/MIN: 10 INJECTION, EMULSION INTRAVENOUS at 13:07

## 2022-05-31 RX ADMIN — PROCHLORPERAZINE EDISYLATE 5 MG: 5 INJECTION INTRAMUSCULAR; INTRAVENOUS at 17:07

## 2022-05-31 RX ADMIN — ONDANSETRON 4 MG: 2 INJECTION INTRAMUSCULAR; INTRAVENOUS at 15:34

## 2022-05-31 RX ADMIN — BUPIVACAINE HYDROCHLORIDE AND EPINEPHRINE BITARTRATE 15 ML: 2.5; .005 INJECTION, SOLUTION EPIDURAL; INFILTRATION; INTRACAUDAL; PERINEURAL at 12:21

## 2022-05-31 RX ADMIN — FENTANYL CITRATE 100 MCG: 50 INJECTION, SOLUTION INTRAMUSCULAR; INTRAVENOUS at 12:54

## 2022-05-31 NOTE — OP NOTE
Procedure Date: 05/31/2022    PREOPERATIVE DIAGNOSIS:  Left knee.    POSTOPERATIVE DIAGNOSIS:  Left knee.    PROCEDURE:  Open left knee VY quadricepsplasty with hardware removal and allograft.    SURGEON:  Spencer Celeste MD    FIRST ASSISTANT:  Christine Alex PA-C.    INDICATIONS FOR PROCEDURE:  Ms. Jacobson is a very pleasant 72-year-old female who had a left quadriceps tendon repair and tibial nail following an injury just over a year ago.  Following this, she has had difficulty with stiffness, particularly in flexion of her left knee.  She has had a history of radiation of the limb due to previous oncology diagnosis in that limb and due to this, has had difficulty with tissue healing and chronic lymphedema.  About 6 months ago, we attempted an open lysis of adhesions that resulted in a patellar fracture, which was treated at that time.  It has gone on to heal the fracture nicely, but continued to have stiffness in the left knee.  We discussed treatment options.  I discussed open VY quadricepsplasty.  She and her  understand the risks, benefits and alternatives, particularly the risk of wound healing problems and infection and wished to proceed with surgery.  They also understand the limited nature of the results of this operation.    NARRATIVE EVENTS:  After thorough preoperative evaluation and proper identification of the patient to be operated on, Ms. Jacobson was taken to the operating room where she underwent a general anesthetic, placed supine on the operating table and the left leg was prepped and draped in the usual sterile manner.  After appropriate surgical pause confirming the patient's extremity to be operated on, 10 minutes after received 2 grams of Ancef, her left leg was approached through a previous incision anterior knee incision, the skin and soft tissue sharply dissected down to the patella and this was extended proximally along the quadriceps tendon.  Once this was exposed, we then  performed a median parapatellar arthrotomy, releasing the tissue scarring around the patella and doing a significant synovectomy.  We then extended from the most proximal end of this incision extending a second incision through the quadriceps tendon extending distally and laterally along the lateral border of the quadriceps tendon, to a point at the junction of the patellar retinaculum and the anterior fibers of the IT band.  This allowed flexion to about 110 degrees.  By doing this, we then were able to thoroughly irrigate the wound.  We released all the quadriceps musculature proximally from any adhesions to the femur and we then brought the knee back to about 90 degrees of flexion and closed the proximal end of the quadriceps split this thus taking it from a V to a Y and then were able to extend the proximal end of the patella fragment of the quadriceps fit into that the remaining bed of the quadriceps tendon, thus creating advancement laterally.  We then closed the wound medially with #5 Ethibond sutures as well as interrupted #1 Vicryl sutures.  Once this was done, we were not able to close the lateral side and leaving a lateral release.  We placed an allograft Achilles tendon in the defect laterally, suturing it into position using.  #1 Vicryl sutures, thus augmenting the tendinous repair.  We then found that the patient had a good range of motion from 0 to about 95 degrees.  At this point, we then thoroughly irrigated the wound.  We closed it in layers after placing a gram of powdered vancomycin, and the wound closed in layers with absorbable suture and staples in the skin.  The patient was placed in a well-padded postop dressing, taken to recovery room in stable condition.  She tolerated the procedure without difficulty.    Spencer Celeste MD        D: 2022   T: 2022   MT: [id number]    Name:     DIANA ELAINE  MRN:      [id number]        Account:        [id number]   :       1950           Procedure Date: 05/31/2022     Document: [id number]

## 2022-05-31 NOTE — PROGRESS NOTES
[facility] Progress Note    Met with patient regarding request for presurgical prayer.    Patient is Sabianist Jewish and looks to her mikhail as a resource.    Patient requested prayer for successful surgery.  Patient,  and I prayed together.    Patient expressed gratitude for the visit.      Rev. Mariana Agustin M.Div.  Staff   Phone  [phone]

## 2022-05-31 NOTE — ANESTHESIA PREPROCEDURE EVALUATION
Anesthesia Pre-Procedure Evaluation    Patient: Angeli Jacobson   MRN: [id number] : 1950        Procedure : Procedure(s):  Left knee z-plasty quadricepsplasty  Left knee patella hardware removal          Past Medical History:   Diagnosis Date     * * * SBE PROPHYLAXIS * * *     Amox 500mg, take 4 tabs one hour prior to procedure.Takes this because of lymphedema secondary from leg surgey     Antiplatelet or antithrombotic long-term use      Arrhythmia      Central serous retinopathy     Resolved 2001     CHRONIC NECK PAIN      Depressive disorder      Depressive disorder, not elsewhere classified      Elevated coronary artery calcium score=2021     Elevated coronary artery calcium score=2021     History of blood transfusion 2019    during left hip pinning, during surgery for patella     Lateral epicondylitis      MIXED HYPERLIPIDEMIA, LDL GOAL <160     LDL goal < 160     Motion sickness      MYXOID LIPOSARCOMA 2000    Left thigh, S/P excision, radiation  at [facility]     Myxoid liposarcoma (HCC) 2004    CHRONIC LEFT THIGH LYMPHEDEMA     Nontoxic multinodular goiter 2005    needs yearly US     Osteoporosis, unspecified      Other chronic pain     fx ribs left      Other lymphedema 2000    left thigh, gets regular PT for this     Overactive bladder      PAD (peripheral artery disease) (H) 2018     PONV (postoperative nausea and vomiting)      SHINGLES      Sprain of lumbosacral (joint) (ligament)     right     Unspecified hearing loss     chronic tinnitus     Unspecified tinnitus       Past Surgical History:   Procedure Laterality Date     ARTHROPLASTY HIP Left 10/4/2019    Procedure: Removal of left femoral hardware with a conversion to left total hip arthroplasty using a Biomet Annalisa femoral stem with an OsseoTi acetabular shell and a dual mobility bearing surface;  Surgeon: Spencer Celeste MD;  Location:  [facility] OR     BIOPSY  April 2000     BYPASS GRAFT FEMOROPOPLITEAL Left 1/21/2019    Procedure: LEFT FEMORAL TO ABOVE KNEE POPLITEAL  BYPASS WITH POLYTETRAFLUOROETHYLENE GRAFT;  Surgeon: Shade Owens MD;  Location:  OR     COLONOSCOPY N/A 2/5/2016    Procedure: COMBINED COLONOSCOPY, SINGLE OR MULTIPLE BIOPSY/POLYPECTOMY BY BIOPSY;  Surgeon: Varun Stanley MD, MD;  Location:  GI     COLONOSCOPY N/A 12/10/2021    Procedure: COLONOSCOPY, WITH POLYPECTOMIES  USING COLD  SNARE,   CLIP APPLIED X2;  Surgeon: Dayton Luna MD;  Location:  GI     CYSTOSCOPY       EXCISION MALIG LESION>1.25CM  5/2000    Myxoid Liposarcoma       EXPLORE GROIN Right 5/1/2018    Procedure: EXPLORE GROIN;  EMERGENCY RIGHT FEMORAL EXPLORATION WITH FEMORAL ARTERY REPAIR.    EBL: 50mL;  Surgeon: Shade Owens MD;  Location:  OR     HC COLP CERVIX/UPPER VAGINA  07/1997    Negative     HC DILATION/CURETTAGE DIAG/THER NON OB  02/1997    Post menopausal bleeding on HRT, negative     HIP SURGERY Left 04/13/2019     IR ANGIOGRAM THROUGH CATHETER FOLLOW UP  12/20/2018     IR ANGIOGRAM THROUGH CATHETER FOLLOW UP  12/21/2018     IR LOWER EXTREMITY ANGIOGRAM LEFT  12/19/2018     OPEN REDUCTION INTERNAL FIXATION PATELLA Left 12/21/2020    Procedure: Left partial patella fracture excision with advancement of the quadriceps tendon;  Surgeon: Stephen Rhodes MD;  Location:  OR     OPEN REDUCTION INTERNAL FIXATION RODDING INTRAMEDULLARY TIBIA Left 12/21/2020    Procedure: Open reduction intramedullary nailing of left tibia fracture;  Surgeon: Stephen Rhodes MD;  Location:  OR     REPAIR TENDON QUADRICEPS Left 7/28/2021    Procedure: Left knee quadricepsplasty with intraoperative patellar fracture requiring open reduction and internal fixation of the patella;  Surgeon: Spencer Celeste MD;  Location:  OR     VASCULAR SURGERY  04/30/2018    Left SFA stent in bypass graft     [facility] APPENDECTOMY      Appendectomy     [facility]  NONSPECIFIC PROCEDURE  04/2000    Open Biopsy Left Thigh Liposarcoma     ZZHC COLONOSCOPY THRU STOMA, DIAGNOSTIC  2006    due 2010      Allergies   Allergen Reactions     Celexa [Citalopram Hydrobromide]      Decreased libido      Social History     Tobacco Use     Smoking status: Never Smoker     Smokeless tobacco: Never Used   Substance Use Topics     Alcohol use: No      Wt Readings from Last 1 Encounters:   05/26/22 68.8 kg (151 lb 9.6 oz)        Anesthesia Evaluation            ROS/MED HX  ENT/Pulmonary:       Neurologic:       Cardiovascular:     (+) -Peripheral Vascular Disease-CAD ---Taking blood thinners (plavix)     METS/Exercise Tolerance:     Hematologic:       Musculoskeletal: Comment: Chronic lymphedema        GI/Hepatic:       Renal/Genitourinary:       Endo:       Psychiatric/Substance Use:     (+) psychiatric history depression     Infectious Disease:       Malignancy:   (+) Malignancy (sarcoma),     Other:            Physical Exam    Airway        Mallampati: II   TM distance: > 3 FB   Neck ROM: full     Respiratory Devices and Support         Dental           Cardiovascular   cardiovascular exam normal          Pulmonary   pulmonary exam normal                OUTSIDE LABS:  CBC:   Lab Results   Component Value Date    WBC 6.3 05/26/2022    WBC 7.6 12/21/2021    HGB 14.0 05/26/2022    HGB 13.8 12/21/2021    HCT 43.4 05/26/2022    HCT 43.2 12/21/2021     05/26/2022     12/21/2021     BMP:   Lab Results   Component Value Date     05/26/2022     03/02/2022    POTASSIUM 3.9 05/26/2022    POTASSIUM 4.5 03/02/2022    CHLORIDE 106 05/26/2022    CHLORIDE 108 03/02/2022    CO2 29 05/26/2022    CO2 27 03/02/2022    BUN 17 05/26/2022    BUN 15 03/02/2022    CR 0.62 05/26/2022    CR 0.68 03/02/2022     (H) 05/26/2022    GLC 84 03/02/2022     COAGS:   Lab Results   Component Value Date    PTT 27 12/19/2018    INR 1.01 09/27/2019     POC:   Lab Results   Component Value Date      (H) 01/23/2019     HEPATIC:   Lab Results   Component Value Date    ALBUMIN 3.5 03/02/2022    PROTTOTAL 7.3 03/02/2022    ALT 25 03/02/2022    AST 18 03/02/2022    ALKPHOS 87 03/02/2022    BILITOTAL 0.3 03/02/2022     OTHER:   Lab Results   Component Value Date    A1C 5.5 01/17/2019    LNOG 10.0 05/26/2022    MAG 1.8 05/01/2018    TSH 0.59 03/02/2022    T4 0.92 05/17/2021    CRP 17.0 (H) 09/28/2021    SED 53 (H) 09/28/2021       Anesthesia Plan    ASA Status:  3   NPO Status:  NPO Appropriate    Anesthesia Type: General.   Induction: Intravenous.   Maintenance: Balanced.        Consents    Anesthesia Plan(s) and associated risks, benefits, and realistic alternatives discussed. Questions answered and patient/representative(s) expressed understanding.     - Discussed: Risks, Benefits and Alternatives for BOTH SEDATION and the PROCEDURE were discussed     - Discussed with:  Patient         Postoperative Care    Pain management: IV analgesics, Oral pain medications, Multi-modal analgesia, Peripheral nerve block (Single Shot).   PONV prophylaxis: Ondansetron (or other 5HT-3), Dexamethasone or Solumedrol, Background Propofol Infusion     Comments:                Adriana Markham MD

## 2022-05-31 NOTE — PROGRESS NOTES
>Surgical visit complete on date: LMS/Pre op  --- Signed by: Mariana Agustin on 5/31/2022 at 1:22 PM

## 2022-05-31 NOTE — BRIEF OP NOTE
[facility]    Brief Operative Note    Pre-operative diagnosis: Stiffness in joint [M25.60]  Unspecified orthopedic aftercare [Z47.89]  Post-operative diagnosis Same as pre-operative diagnosis    Procedure: Procedure(s):  Left knee z-plasty quadricepsplasty  Left knee patella hardware removal  Surgeon: Surgeon(s) and Role:     * Spencer Celeste MD - Primary     * Christine Alex PA-C - Assisting  Anesthesia: Choice   Estimated Blood Loss: Less than 50 ml    Drains: None  Specimens: * No specimens in log *  Findings:   None.  Complications: None.  Implants:   Implant Name Type Inv. Item Serial No.  Lot No. LRB No. Used Action   GRAFT ACHILLES TENDON W/CALCANEUS 19.5CM [id number] - [id number] Bone/Tissue/Biologic GRAFT ACHILLES TENDON W/CALCANEUS 19.5CM [id number] MUSCULOSKELETAL MARIEE  Left 1 Implanted   [employer] 18GA DS-18 - [id number] Wire WIRE SURGICAL STEEL 18GA DS-18  [employer] [facility]- [id number] DEC 2020 Left 1 Explanted

## 2022-05-31 NOTE — ANESTHESIA CARE TRANSFER NOTE
Patient: Angeli Jacobson    Procedure: Procedure(s):  Left knee z-plasty quadricepsplasty  Left knee patella hardware removal       Diagnosis: Stiffness in joint [M25.60]  Unspecified orthopedic aftercare [Z47.89]  Diagnosis Additional Information: No value filed.    Anesthesia Type:   General     Note:    Oropharynx: oropharynx clear of all foreign objects  Level of Consciousness: drowsy  Oxygen Supplementation: face mask    Independent Airway: airway patency satisfactory and stable  Dentition: dentition unchanged  Vital Signs Stable: post-procedure vital signs reviewed and stable  Report to RN Given: handoff report given  Patient transferred to: PACU    Handoff Report: Identifed the Patient, Identified the Reponsible Provider, Reviewed the pertinent medical history, Discussed the surgical course, Reviewed Intra-OP anesthesia mangement and issues during anesthesia, Set expectations for post-procedure period and Allowed opportunity for questions and acknowledgement of understanding      Vitals:  Vitals Value Taken Time   /74 05/31/22 1448   Temp     Pulse 74 05/31/22 1451   Resp 6 05/31/22 1451   SpO2 98 % 05/31/22 1451   Vitals shown include unvalidated device data.    Electronically Signed By: ELVA Rodriguez CRNA  May 31, 2022  2:52 PM

## 2022-05-31 NOTE — ANESTHESIA PROCEDURE NOTES
Adductor canal Procedure Note    Pre-Procedure   Staff -        Anesthesiologist:  Adriana Markham MD       Performed By: anesthesiologist       Location: pre-op       Procedure Start/Stop Times: 5/31/2022 12:21 PM and 5/31/2022 12:28 PM       Pre-Anesthestic Checklist: patient identified, IV checked, site marked, risks and benefits discussed, informed consent, monitors and equipment checked, pre-op evaluation, at physician/surgeon's request and post-op pain management  Timeout:       Correct Patient: Yes        Correct Procedure: Yes        Correct Site: Yes        Correct Position: Yes        Correct Laterality: Yes        Site Marked: Yes  Procedure Documentation  Procedure: Adductor canal       Laterality: left       Patient Position: supine       Skin prep: Betadine       Ultrasound guided       1. Ultrasound was used to identify targeted nerve, plexus, vascular marker, or fascial plane and place a needle adjacent to it in real-time.       2. Ultrasound was used to visualize the spread of anesthetic in close proximity to the above referenced structure.       5. There were no apparent abnormal pathologic findings.    Assessment/Narrative         The placement was negative for: blood aspirated and painful injection       Paresthesias: No.       Bolus given via needle..        Secured via.        Insertion/Infusion Method: Single Shot       Complications: none       Injection made incrementally with aspirations every 5 mL.    Medication(s) Administered   Bupivacaine 0.25% w/ 1:200K Epi (Injection) - Injection   15 mL - 5/31/2022 12:21:00 PM  Medication Administration Time: 5/31/2022 12:21 PM     Comments:  History of fem pop bypass/ grafting. Local deposited lateral to graft

## 2022-05-31 NOTE — ANESTHESIA POSTPROCEDURE EVALUATION
Patient: Angeli Jacobson    Procedure: Procedure(s):  Left knee z-plasty quadricepsplasty  Left knee patella hardware removal       Anesthesia Type:  General    Note:  Disposition: Inpatient   Postop Pain Control: Uneventful            Sign Out: Well controlled pain   PONV: No   Neuro/Psych: Uneventful            Sign Out: Acceptable/Baseline neuro status   Airway/Respiratory: Uneventful            Sign Out: Acceptable/Baseline resp. status   CV/Hemodynamics: Uneventful            Sign Out: Acceptable CV status; No obvious hypovolemia; No obvious fluid overload   Other NRE: NONE   DID A NON-ROUTINE EVENT OCCUR? No           Last vitals:  Vitals Value Taken Time   /66 05/31/22 1525   Temp 97.4  F (36.3  C) 05/31/22 1450   Pulse 68 05/31/22 1527   Resp 9 05/31/22 1527   SpO2 96 % 05/31/22 1527   Vitals shown include unvalidated device data.    Electronically Signed By: Adriana Markham MD  May 31, 2022  3:28 PM

## 2022-06-01 ENCOUNTER — APPOINTMENT (OUTPATIENT)
Dept: PHYSICAL THERAPY | Facility: CLINIC | Age: 72
End: 2022-06-01
Attending: ORTHOPAEDIC SURGERY
Payer: COMMERCIAL

## 2022-06-01 ENCOUNTER — APPOINTMENT (OUTPATIENT)
Dept: OCCUPATIONAL THERAPY | Facility: CLINIC | Age: 72
End: 2022-06-01
Attending: ORTHOPAEDIC SURGERY
Payer: COMMERCIAL

## 2022-06-01 VITALS
SYSTOLIC BLOOD PRESSURE: 113 MMHG | OXYGEN SATURATION: 95 % | HEART RATE: 80 BPM | BODY MASS INDEX: 23.88 KG/M2 | WEIGHT: 148.6 LBS | RESPIRATION RATE: 16 BRPM | TEMPERATURE: 98 F | HEIGHT: 66 IN | DIASTOLIC BLOOD PRESSURE: 48 MMHG

## 2022-06-01 LAB
FASTING STATUS PATIENT QL REPORTED: ABNORMAL
GLUCOSE BLD-MCNC: 137 MG/DL (ref 70–99)
HGB BLD-MCNC: 11.3 G/DL (ref 11.7–15.7)
PLATELET # BLD AUTO: 207 10E3/UL (ref 150–450)

## 2022-06-01 PROCEDURE — 99213 OFFICE O/P EST LOW 20 MIN: CPT | Performed by: PHYSICIAN ASSISTANT

## 2022-06-01 PROCEDURE — 97530 THERAPEUTIC ACTIVITIES: CPT | Mod: GP | Performed by: PHYSICAL THERAPIST

## 2022-06-01 PROCEDURE — 250N000013 HC RX MED GY IP 250 OP 250 PS 637: Performed by: ORTHOPAEDIC SURGERY

## 2022-06-01 PROCEDURE — 97161 PT EVAL LOW COMPLEX 20 MIN: CPT | Mod: GP | Performed by: PHYSICAL THERAPIST

## 2022-06-01 PROCEDURE — 85049 AUTOMATED PLATELET COUNT: CPT | Performed by: ORTHOPAEDIC SURGERY

## 2022-06-01 PROCEDURE — 250N000011 HC RX IP 250 OP 636: Performed by: ORTHOPAEDIC SURGERY

## 2022-06-01 PROCEDURE — 36415 COLL VENOUS BLD VENIPUNCTURE: CPT | Performed by: ORTHOPAEDIC SURGERY

## 2022-06-01 PROCEDURE — 96372 THER/PROPH/DIAG INJ SC/IM: CPT | Performed by: ORTHOPAEDIC SURGERY

## 2022-06-01 PROCEDURE — 97116 GAIT TRAINING THERAPY: CPT | Mod: GP | Performed by: PHYSICAL THERAPIST

## 2022-06-01 PROCEDURE — 97535 SELF CARE MNGMENT TRAINING: CPT | Mod: GO

## 2022-06-01 PROCEDURE — 82947 ASSAY GLUCOSE BLOOD QUANT: CPT | Performed by: ORTHOPAEDIC SURGERY

## 2022-06-01 PROCEDURE — 97110 THERAPEUTIC EXERCISES: CPT | Mod: GP | Performed by: PHYSICAL THERAPIST

## 2022-06-01 PROCEDURE — 85018 HEMOGLOBIN: CPT | Performed by: ORTHOPAEDIC SURGERY

## 2022-06-01 PROCEDURE — 97165 OT EVAL LOW COMPLEX 30 MIN: CPT | Mod: GO

## 2022-06-01 PROCEDURE — 250N000013 HC RX MED GY IP 250 OP 250 PS 637: Performed by: PHYSICIAN ASSISTANT

## 2022-06-01 RX ADMIN — OXYCODONE HYDROCHLORIDE 5 MG: 5 TABLET ORAL at 02:38

## 2022-06-01 RX ADMIN — ENOXAPARIN SODIUM 40 MG: 40 INJECTION SUBCUTANEOUS at 11:47

## 2022-06-01 RX ADMIN — FAMOTIDINE 20 MG: 20 TABLET, FILM COATED ORAL at 08:16

## 2022-06-01 RX ADMIN — CELECOXIB 100 MG: 100 CAPSULE ORAL at 08:16

## 2022-06-01 RX ADMIN — CEFAZOLIN 1 G: 1 INJECTION, POWDER, FOR SOLUTION INTRAMUSCULAR; INTRAVENOUS at 02:35

## 2022-06-01 RX ADMIN — ACETAMINOPHEN 975 MG: 325 TABLET, FILM COATED ORAL at 10:36

## 2022-06-01 RX ADMIN — OXYCODONE HYDROCHLORIDE 5 MG: 5 TABLET ORAL at 06:57

## 2022-06-01 RX ADMIN — POLYETHYLENE GLYCOL 3350 17 G: 17 POWDER, FOR SOLUTION ORAL at 08:17

## 2022-06-01 RX ADMIN — LEVOTHYROXINE SODIUM 75 MCG: 0.07 TABLET ORAL at 08:16

## 2022-06-01 RX ADMIN — SENNOSIDES AND DOCUSATE SODIUM 1 TABLET: 50; 8.6 TABLET ORAL at 08:16

## 2022-06-01 NOTE — PROGRESS NOTES
"Orthopedic Surgery  6/1/2022  POD#: 1    S: Patient voices no complaints today.     O: Blood pressure 112/51, pulse 76, temperature 97.8  F (36.6  C), temperature source Temporal, resp. rate 14, height 1.676 m (5' 6\"), weight 67.4 kg (148 lb 9.6 oz), last menstrual period 03/18/2005, SpO2 95 %, not currently breastfeeding.  Lab Results   Component Value Date    HGB 11.3 06/01/2022    HGB 13.2 05/17/2021     Lab Results   Component Value Date    INR 1.01 09/27/2019        I/O last 3 completed shifts:  In: 1020 [P.O.:220; I.V.:800]  Out: 101 [Emesis/NG output:1; Blood:100]    Neurovascularly intact.  Calves are negative bilaterally, both soft and nontender.  The wound is C/D/I.  The wound looks good with minimal erythema of the surrounding skin.    A: Ms. Jacobson is doing well status post Procedure(s):  Open left knee VY quadricepsplasty with hardware removal and allograft  Left knee patella hardware removal.    P:   1. Mobilize and continue physical therapy. WBAT with CPM at home.  2. Anticoagulation - resume plavix and ASA today  3. Pain management - controlled, encouraged tylenol scheduled, with occasional oxycodone  4. Anticipate discharge to home today.      Christine Alex PA-C  O: [phone]  "

## 2022-06-01 NOTE — PROGRESS NOTES
[facility]      OUTPATIENT OCCUPATIONAL THERAPY  EVALUATION  PLAN OF TREATMENT FOR OUTPATIENT REHABILITATION  (COMPLETE FOR INITIAL CLAIMS ONLY)  Patient's Last Name, First Name, M.I.  YOB: 1950  Angeli Jacobson                          Provider's Name  [facility] Medical Record No.  [id number]                               Onset Date:  05/31/22   Start of Care Date:  06/01/22     Type:     ___PT   _X_OT   ___SLP Medical Diagnosis:  s/p open L quadricepplasty with hardware removal/allograft                        OT Diagnosis:  Impaired ADLs, IADLs and mobility tasks   Visits from SOC:  1   _________________________________________________________________________________  Plan of Treatment/Functional Goals    Planned Interventions: ADL retraining, IADL retraining, strengthening, transfer training, progressive activity/exercise   Goals: See Occupational Therapy Goals on Care Plan in [employer] electronic health record.    Therapy Frequency: One time eval and treatment  Predicted Duration of Therapy Intervention: 06/01/22  _________________________________________________________________________________    I CERTIFY THE NEED FOR THESE SERVICES FURNISHED UNDER        THIS PLAN OF TREATMENT AND WHILE UNDER MY CARE     (Physician co-signature of this document indicates review and certification of the therapy plan).              Certification date from: 06/01/22, Certification date to: 06/01/22    Referring Physician: Spencer Celeste MD            Initial Assessment        See Occupational Therapy evaluation dated 06/01/22 in Epic electronic health record.

## 2022-06-01 NOTE — PLAN OF CARE
Patient vital signs are at baseline: Yes  Patient able to ambulate as they were prior to admission or with assist devices provided by therapies during their stay:  Yes  Patient MUST void prior to discharge:  Yes  Patient able to tolerate oral intake:  Yes  Pain has adequate pain control using Oral analgesics:  Yes  Does patient have an identified :  Yes  Has goal D/C date and time been discussed with patient:  Yes  Goal Outcome Evaluation:  CMS intact, Up with SBA,GB and walker.  AVS reviewed with patient. Questions answered.  Belongings and discharge med sent home with pt.  Pt discharged home with , escorted to door with nursing assistant.

## 2022-06-01 NOTE — CONSULTS
Hospitalist Consultation      Angeli Jacobson MRN# [id number]   YOB: 1950 Age: 72 year old   Date of Admission: 5/31/2022     Requesting Physician:  Dr. Celeste  Reason for consult: Medical management           Assessment and Plan:   This patient is a 72 year old female with a PMH significant for PAD with history of left femoral to above knee popliteal bypass, HLP, hypothyroidism and overactive bladder who is POD 1 s/p open quadricepplasty with hardware removal/allograft.     # S/p open quadricepplasty with hardware removal/allograft  -hx of a left quadriceps tendon repair and tibial nail approximately 1 year ago with development of stiffness requiring repair  -pain control adequate  -No hypoxia and breathing comfortably on room air  -Eating and drinking with some nausea  -No passage of gas yet  -Planning to return home today  -Currently has PCDs for DVT prophylaxis, recommend resuming Plavix and Aspirin as soon as ortho comfortable      #Hx of PAD s/p left femoral to above knee popliteal bypass  -Continue Plavix and Aspirin as soon as possible    #Hyperlipidemia  -Continue Zetia and Atorvastatin    #Hypothyroidism  -Continue Levothyroxine    #Overactive bladder  -Continue Tolteridone      DVT Prophylaxis: on PCDs as per primary  D/C planning: To home probably today             History of Present Illness:   This patient is a 72 year old female who is POD 1 s/p open quadricepplasty with hardware removal/allograft.  Intra-op report reviewed and showed no intra-op complications.   I/o's reviewed, Currently net positive with good UOP since OR. Hgb stable this am at 11.3 without dizziness or lightheadedness.  Overnight did pretty well without complaints. Vital signs stable. Eating and drinking without difficulty but has some intermittent nausea. Urinating on her own.  Has not passed gas.   Other medical problems are stable without recent illness.                 Past Medical History:     Past Medical  History:   Diagnosis Date      1998    Amox 500mg, take 4 tabs one hour prior to procedure.Takes this because of lymphedema secondary from leg surgey     Antiplatelet or antithrombotic long-term use      Arrhythmia      Central serous retinopathy 2001    Resolved 9/2001     CHRONIC NECK PAIN 1995     Depressive disorder      Depressive disorder, not elsewhere classified 2001     Elevated coronary artery calcium score=7/1/21 08/03/2021     Elevated coronary artery calcium score=7/1/21 08/03/2021     History of blood transfusion 04/2019 12/2020    during left hip pinning, during surgery for patella     Lateral epicondylitis      MIXED HYPERLIPIDEMIA, LDL GOAL <160 1998    LDL goal < 160     Motion sickness      MYXOID LIPOSARCOMA 2000    Left thigh, S/P excision, radiation  at 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    CHRONIC LEFT THIGH LYMPHEDEMA     Nontoxic multinodular goiter 2005    needs yearly US     Osteoporosis, unspecified 2001     Other chronic pain     fx ribs left      Other lymphedema 2000    left thigh, gets regular PT for this     Overactive bladder      PAD (peripheral artery disease) (H) 04/20/2018     PONV (postoperative nausea and vomiting)      SHINGLES 2001     Sprain of lumbosacral (joint) (ligament) 1995    right     Unspecified hearing loss 1998    chronic tinnitus     Unspecified tinnitus 1998               Past Surgical History:     Past Surgical History:   Procedure Laterality Date     ARTHROPLASTY HIP Left 10/4/2019    Procedure: Removal of left femoral hardware with a conversion to left total hip arthroplasty using a Biomet Annalisa femoral stem with an OsseoTi acetabular shell and a dual mobility bearing surface;  Surgeon: Spencer Celeste MD;  Location: [facility] OR     BIOPSY  April 2000     BYPASS GRAFT FEMOROPOPLITEAL Left 1/21/2019    Procedure: LEFT FEMORAL TO ABOVE KNEE POPLITEAL  BYPASS WITH POLYTETRAFLUOROETHYLENE GRAFT;  Surgeon: Shade Owens MD;  Location:   OR     COLONOSCOPY N/A 2/5/2016    Procedure: COMBINED COLONOSCOPY, SINGLE OR MULTIPLE BIOPSY/POLYPECTOMY BY BIOPSY;  Surgeon: Varun Stanley MD, MD;  Location:  GI     COLONOSCOPY N/A 12/10/2021    Procedure: COLONOSCOPY, WITH POLYPECTOMIES  USING COLD  SNARE,   CLIP APPLIED X2;  Surgeon: Dayton Luna MD;  Location:  GI     CYSTOSCOPY       EXCISION MALIG LESION>1.25CM  5/2000    Myxoid Liposarcoma       EXPLORE GROIN Right 5/1/2018    Procedure: EXPLORE GROIN;  EMERGENCY RIGHT FEMORAL EXPLORATION WITH FEMORAL ARTERY REPAIR.    EBL: 50mL;  Surgeon: Shade Owens MD;  Location:  OR     HC COLP CERVIX/UPPER VAGINA  07/1997    Negative     HC DILATION/CURETTAGE DIAG/THER NON OB  02/1997    Post menopausal bleeding on HRT, negative     HIP SURGERY Left 04/13/2019     IR ANGIOGRAM THROUGH CATHETER FOLLOW UP  12/20/2018     IR ANGIOGRAM THROUGH CATHETER FOLLOW UP  12/21/2018     IR LOWER EXTREMITY ANGIOGRAM LEFT  12/19/2018     OPEN REDUCTION INTERNAL FIXATION PATELLA Left 12/21/2020    Procedure: Left partial patella fracture excision with advancement of the quadriceps tendon;  Surgeon: Stephen Rhodes MD;  Location:  OR     OPEN REDUCTION INTERNAL FIXATION RODDING INTRAMEDULLARY TIBIA Left 12/21/2020    Procedure: Open reduction intramedullary nailing of left tibia fracture;  Surgeon: Stephen Rhodes MD;  Location:  OR     REPAIR TENDON QUADRICEPS Left 7/28/2021    Procedure: Left knee quadricepsplasty with intraoperative patellar fracture requiring open reduction and internal fixation of the patella;  Surgeon: Spencer Celeste MD;  Location:  OR     VASCULAR SURGERY  04/30/2018    Left SFA stent in bypass graft     ZZC APPENDECTOMY      Appendectomy     ZZC NONSPECIFIC PROCEDURE  04/2000    Open Biopsy Left Thigh Liposarcoma     ZZHC COLONOSCOPY THRU STOMA, DIAGNOSTIC  2006    due 2010                 Social History:     Social History     Tobacco Use     Smoking status: Never  Smoker     Smokeless tobacco: Never Used   Vaping Use     Vaping Use: Never used   Substance Use Topics     Alcohol use: No     Drug use: No                 Family History:     family history includes Alcohol/Drug in her father; Anxiety Disorder in her son; C.A.D. in her father, paternal grandmother, and paternal uncle; Cancer in her maternal aunt and maternal grandmother; Colon Cancer (age of onset: 70) in her brother; Coronary Artery Disease in her father; Diabetes in her maternal grandmother; Hyperlipidemia in her cousin, son, son, and son; Obesity in her mother; Osteoporosis in her mother.              Allergies:     Allergies   Allergen Reactions     Celexa [Citalopram Hydrobromide]      Decreased libido             Medications:     Prior to Admission medications    Medication Sig Last Dose Taking? Auth Provider   acetaminophen (TYLENOL) 325 MG tablet Take 2 tablets (650 mg) by mouth every 4 hours as needed for other (mild pain)  Yes Spencer Celeste MD   aspirin (ASA) 325 MG EC tablet Take 1 tablet (325 mg) by mouth 2 times daily  Yes Spencer Celeste MD   aspirin (ASA) 81 MG chewable tablet Take 81 mg by mouth daily 5/30/2022 at 0800 Yes Reported, Patient   calcium carbonate 600 mg-vitamin D 400 units (CALTRATE) 600-400 MG-UNIT per tablet Take 1 chew tab by mouth daily  Yes Reported, Patient   clopidogrel (PLAVIX) 75 MG tablet TAKE 1 TABLET BY MOUTH EVERY DAY 5/25/2022 Yes Tiesha Prince MD   econazole nitrate 1 % external cream Apply topically daily  Patient taking differently: Apply topically daily Toes 5/30/2022 at 0800 Yes Sepideh Burris PA-C   evolocumab (REPATHA) 140 MG/ML prefilled autoinjector Inject 1 mL (140 mg) Subcutaneous every 14 days 5/28/2022 Yes Spencer Wilder MD   ezetimibe (ZETIA) 10 MG tablet Take 1 tablet (10 mg) by mouth daily 5/30/2022 at 0800 Yes Tiesha Prince MD   levothyroxine (SYNTHROID/LEVOTHROID) 75 MCG tablet Take 1 tablet  "(75 mcg) by mouth daily 5/30/2022 at 0800 Yes Tiesha Prince MD   multivitamin, therapeutic (THERA-VIT) TABS tablet Take 1 tablet by mouth daily 5/30/2022 at 0800 Yes Reported, Patient   nitroFURantoin macrocrystal-monohydrate (MACROBID) 100 MG capsule TAKE 1 CAPSULE BY MOUTH AFTER INTRCOURSE Past Week at Unknown time Yes Sepideh Burris PA-C   oxyCODONE (ROXICODONE) 5 MG tablet Take 1-2 tablets (5-10 mg) by mouth every 4 hours as needed for moderate to severe pain  Yes Spencer Celeste MD   rosuvastatin (CRESTOR) 20 MG tablet Take 1 tablet (20 mg) by mouth daily 5/30/2022 at 1900 Yes Tiesha Prince MD   senna-docusate (SENOKOT-S/PERICOLACE) 8.6-50 MG tablet Take 1-2 tablets by mouth 2 times daily Take while on oral narcotics to prevent or treat constipation.  Yes Spencer Celeste MD   solifenacin (VESICARE) 5 MG tablet Take 5 mg by mouth daily 5/30/2022 at 2100 Yes Reported, Patient   Vitamin D3 (CHOLECALCIFEROL) 25 mcg (1000 units) tablet Take 2 tablets (50 mcg) by mouth daily 5/30/2022 at 0800 Yes Sepideh Burris PA-C   order for DME Equipment being ordered: Left Thigh High Compression Stocking, 550 CCL.3   Yanelis Bailey MD   ORDER FOR DME Equipment being ordered: lymphedema bandages   Yanelis Bailey MD               Review of Systems:     A comprehensive greater than 10 system review of systems was carried out.  Pertinent positives and negatives are noted above.  Otherwise negative for contributory info.            Physical Exam:   Vitals were reviewed  Blood pressure 112/51, pulse 76, temperature 97.8  F (36.6  C), temperature source Temporal, resp. rate 14, height 1.676 m (5' 6\"), weight 67.4 kg (148 lb 9.6 oz), last menstrual period 03/18/2005, SpO2 95 %, not currently breastfeeding.  Exam:    GENERAL:  Comfortable.  PSYCH: pleasant, oriented, No acute distress.  HEENT:  PERRLA. Normal conjunctiva, normal hearing, nasal mucosa and Oropharynx are " normal.  NECK:  Supple, no neck vein distention, adenopathy or bruits, normal thyroid.  HEART:  Normal S1, S2 with no murmur, no pericardial rub, S3 or S4.  LUNGS:  Clear to auscultation, normal Respiratory effort.  ABDOMEN:  Soft, no hepatosplenomegaly, normal bowel sounds.  EXTREMITIES:  No pedal edema, +2 pulses bilateral and equal.  SKIN:  Dry to touch, No rash, wound or ulcerations.  NEUROLOGIC:  Nonfocal with normal cranial nerve and motor power and sensation.            Data:   Past 24 hours labs, studies, and imaging were reviewed.  Recent Labs   Lab 06/01/22  0732 05/26/22  1345   WBC  --  6.3   HGB 11.3* 14.0   HCT  --  43.4   MCV  --  94    242     Recent Labs   Lab 06/01/22  0732 05/31/22  1917 05/26/22  1345   NA  --   --  139   POTASSIUM  --   --  3.9   CHLORIDE  --   --  106   CO2  --   --  29   ANIONGAP  --   --  4   *  --  107*   BUN  --   --  17   CR  --  0.58 0.62   GFRESTIMATED  --  >90 >90   LONG  --   --  10.0       Jana Meier PA-C    Pt discussed with Dr. Sellers who agrees with the care as discussed above.

## 2022-06-01 NOTE — PROGRESS NOTES
Care Management Discharge Note    Discharge Date: 06/01/2022     Discharge Disposition:  Home    Discharge Services: Home PT     Discharge DME: None    Discharge Transportation:  Family or friend will transport patient at discharge.     Private pay costs discussed: Not applicable    Education Provided on the Discharge Plan:  Yes  Persons Notified of Discharge Plans: Patient, RN & CM  Patient/Family in Agreement with the Plan:  Yes    Handoff Referral Completed: Yes - to AC/Critical access hospital    Additional Information:  CM received call from Christine DOTSON for Dr. Celeste. She stated patient wants home PT at discharge. Christine will enter order and face-to-face. Patient's primary care provider is a [place] MD. Sent referral to [facility] for PT services. Await response on availability.     RN CC will continue to follow for discharge planning.    Lety Rubio RN, BSN, CPHN, CM  Inpatient Care Coordination - [facility]  [phone]

## 2022-06-01 NOTE — PROGRESS NOTES
06/01/22 1000   Quick Adds   Type of Visit Initial PT Evaluation   Living Environment   People in Home spouse   Current Living Arrangements house   Home Accessibility stairs to enter home;stairs within home   Number of Stairs, Main Entrance 2   Number of Stairs, Within Home, Primary greater than 10 stairs   Stair Railings, Within Home, Primary railings safe and in good condition   Transportation Anticipated family or friend will provide   Living Environment Comments two story home   Self-Care   Usual Activity Tolerance good   Current Activity Tolerance moderate   Equipment Currently Used at Home cane, straight   Fall history within last six months no   Activity/Exercise/Self-Care Comment indep w ADLs and self cares, mod indep w/ mobility   General Information   Onset of Illness/Injury or Date of Surgery 05/31/22   Referring Physician Roula   Pertinent History of Current Problem (include personal factors and/or comorbidities that impact the POC) 72 year old female with a PMH significant for PAD with history of left femoral to above knee popliteal bypass, HLP, hypothyroidism and overactive bladder who is POD 1 s/p open quadricepplasty with hardware removal/allograft.   Existing Precautions/Restrictions fall   Weight-Bearing Status - LLE weight-bearing as tolerated   Cognition   Affect/Mental Status (Cognition) anxious   Orientation Status (Cognition) oriented x 4   Follows Commands (Cognition) follows two-step commands;75-90% accuracy   Pain Assessment   Patient Currently in Pain Yes, see Vital Sign flowsheet   Range of Motion (ROM)   ROM Comment L knee inhibited by pain and edema   Strength (Manual Muscle Testing)   Strength Comments L LE inhibited by pain   Bed Mobility   Comment, (Bed Mobility) CGA   Transfers   Comment, (Transfers) SBA w WW   Gait/Stairs (Locomotion)   Comment, (Gait/Stairs) SBA w WW dec step length antaligic step too pattern   Balance   Balance Comments impaired in standing, post knee  surgery, pain w/ WB   Clinical Impression   Criteria for Skilled Therapeutic Intervention Yes, treatment indicated   PT Diagnosis (PT) L knee pain   Influenced by the following impairments impaired gait, dec indep transfers   Functional limitations due to impairments impaired mobility   Clinical Presentation (PT Evaluation Complexity) Stable/Uncomplicated   Clinical Presentation Rationale clnical assessment   Clinical Decision Making (Complexity) low complexity   Planned Therapy Interventions (PT) bed mobility training;cryotherapy;gait training;home exercise program;strengthening;stretching;transfer training   Anticipated Equipment Needs at Discharge (PT) other (see comments)  (limb elevator)   Risk & Benefits of therapy have been explained evaluation/treatment results reviewed;care plan/treatment goals reviewed;risks/benefits reviewed   PT Discharge Planning   PT Discharge Recommendation (DC Rec) home with assist;home with home care physical therapy;home with outpatient physical therapy;Leaving home requires significant assistance;Leaving home requires significant taxing effort   PT Rationale for DC Rec Pt below baseline for mobility and ADLS benefitting from single IP session w/ PT, recommending 2 wks home therapies followed by continuing at OPPT once more mobile and with improved acitivity tolerance   Plan of Care Review   Plan of Care Reviewed With patient;spouse   Total Evaluation Time   Total Evaluation Time (Minutes) 10   Physical Therapy Goals   PT Frequency One time eval and treatment only

## 2022-06-01 NOTE — PROGRESS NOTES
Care Management Discharge Note    Discharge Date: 06/01/2022      Additional Information:  Patient has [facility] PCP. SW will order [facility] if flagged.         Leticia Bray

## 2022-06-01 NOTE — PROGRESS NOTES
Patient vital signs are at baseline: Yes  Patient able to ambulate as they were prior to admission or with assist devices provided by therapies during their stay:  No,  Reason:  A-2 with gate belt and walker.   Patient MUST void prior to discharge:  Yes  Patient able to tolerate oral intake:  Yes but was nauseous earlier and had 1 emesis . Patient reported having similar symptoms with previous surgery.   Pain has adequate pain control using Oral analgesics:  Yes, oxy 5 mg effective for pain control.   Does patient have an identified :  Yes  Has goal D/C date and time been discussed with patient:  Yes     Ace bandage on left knee.ambulating with A-1 with the gate belt and walker. Voiding. Will gustavo to monitor.

## 2022-06-01 NOTE — PROGRESS NOTES
06/01/22 1111   Quick Adds   Type of Visit Initial Occupational Therapy Evaluation   Living Environment   People in Home spouse   Current Living Arrangements house   Home Accessibility stairs to enter home;stairs within home   Number of Stairs, Main Entrance 2   Stair Railings, Within Home, Primary railings safe and in good condition   Transportation Anticipated family or friend will provide   Living Environment Comments Pt lives with spouse in house, 2 KEENAN, stairs to basement (walk in shower in basement), tubs on main level, owns shower chair, RTS   Self-Care   Usual Activity Tolerance good   Current Activity Tolerance moderate   Equipment Currently Used at Home cane, straight   Fall history within last six months no   Activity/Exercise/Self-Care Comment Pt reports mod indep in all ADLs, IADLs and mobility with use of cane.   General Information   Onset of Illness/Injury or Date of Surgery 05/31/22   Referring Physician Spencer Celeste MD   Patient/Family Therapy Goal Statement (OT) Pt's goal is to d/c home   Additional Occupational Profile Info/Pertinent History of Current Problem Per chart: Pt is a 72 year old female s/p open L quadricepplasty with hardware removal/allograft.   Existing Precautions/Restrictions fall   Left Lower Extremity (Weight-bearing Status) weight-bearing as tolerated (WBAT)   Cognitive Status Examination   Orientation Status orientation to person, place and time   Visual Perception   Visual Impairment/Limitations corrective lenses full-time   Pain Assessment   Patient Currently in Pain Yes, see Vital Sign flowsheet  (pain in LLE)   Range of Motion Comprehensive   Comment, General Range of Motion BUEs WFL   Strength Comprehensive (MMT)   Comment, General Manual Muscle Testing (MMT) Assessment BU WF   Bed Mobility   Bed Mobility supine-sit;sit-supine   Supine-Sit [place] (Bed Mobility) supervision   Sit-Supine [place] (Bed Mobility) supervision   Transfers  Spoke with wife, questions answered    Transfers sit-stand transfer;toilet transfer;shower transfer   Sit-Stand Transfer   Sit-Stand [place] (Transfers) contact guard   Assistive Device (Sit-Stand Transfers) walker, front-wheeled   Balance   Balance Comments CGA provided while in stance FWW level   Activities of Daily Living   BADL Assessment/Intervention lower body dressing;toileting   Lower Body Dressing Assessment/Training   [place] Level (Lower Body Dressing) minimum assist (75% patient effort)   Clinical Impression   Criteria for Skilled Therapeutic Interventions Met (OT) Yes, treatment indicated   OT Diagnosis Impaired ADLs, IADLs and mobility tasks   OT Problem List-Impairments impacting ADL problems related to;activity tolerance impaired;strength;pain   ADL comments/analysis Pt below baseline level of functioning in daily tasks   Assessment of Occupational Performance 5 or more Performance Deficits   Identified Performance Deficits Bathing, dressing, grooming, toileting, homemaking, transfers   Planned Therapy Interventions (OT) ADL retraining;IADL retraining;strengthening;transfer training;progressive activity/exercise   Clinical Decision Making Complexity (OT) low complexity   Risk & Benefits of therapy have been explained evaluation/treatment results reviewed;care plan/treatment goals reviewed;current/potential barriers reviewed;risks/benefits reviewed;participants voiced agreement with care plan;participants included;patient   OT Discharge Planning   OT Discharge Recommendation (DC Rec)   (Defer to ortho)   OT Rationale for DC Rec Anticipate pt may require assist for LB dressing, assist for tub and/or shower transfer and IADLs (homemaking, driving, etc). Spouse present and supportive.   Therapy Certification   Start of Care Date 06/01/22   Certification date from 06/01/22   Certification date to 06/01/22   Medical Diagnosis s/p open L quadricepplasty with hardware removal/allograft   Total Evaluation Time (Minutes)   Total  Evaluation Time (Minutes) 10   OT Goals   Therapy Frequency (OT) One time eval and treatment   OT Predicted Duration/Target Date for Goal Attainment 06/01/22   OT Goals Lower Body Dressing;Toilet Transfer/Toileting;OT Goal 1   OT: Lower Body Dressing Minimal assist;using adaptive equipment   OT: Toilet Transfer/Toileting Supervision/stand-by assist;toilet transfer;cleaning and garment management;using adaptive equipment   OT: Goal 1 Pt will perform tub/shower and/or walk in shower transfer with CGA in prep for safe transfer in discharge environment.

## 2022-06-01 NOTE — DISCHARGE INSTRUCTIONS
You have a new order for [facility] for PT services. They will be contacting you within 24-48 hours to set up initial visit. If you have not heard from them after this time, please contact them at ([phone]).

## 2022-06-06 ENCOUNTER — MEDICAL CORRESPONDENCE (OUTPATIENT)
Dept: FAMILY MEDICINE | Facility: CLINIC | Age: 72
End: 2022-06-06

## 2022-06-08 DIAGNOSIS — N32.81 OAB (OVERACTIVE BLADDER): Primary | ICD-10-CM

## 2022-06-08 RX ORDER — SOLIFENACIN SUCCINATE 5 MG/1
5 TABLET, FILM COATED ORAL DAILY
Qty: 90 TABLET | Refills: 0 | Status: SHIPPED | OUTPATIENT
Start: 2022-06-08 | End: 2022-07-22

## 2022-06-09 ENCOUNTER — PATIENT OUTREACH (OUTPATIENT)
Dept: FAMILY MEDICINE | Facility: CLINIC | Age: 72
End: 2022-06-09
Payer: COMMERCIAL

## 2022-06-09 NOTE — TELEPHONE ENCOUNTER
[facility]     Verbal orders provided as below     Occupational Therapy     Lymphedema therapy lower extremity     2 x week x 1 week   1 week off   1 x week x 1 week     Orders will be faxed for signature     Le [place], Registered Nurse, PAL (Patient Advocate Liason)   [facility]   [phone]

## 2022-06-14 ENCOUNTER — TRANSFERRED RECORDS (OUTPATIENT)
Dept: HEALTH INFORMATION MANAGEMENT | Facility: CLINIC | Age: 72
End: 2022-06-14
Payer: COMMERCIAL

## 2022-06-14 ENCOUNTER — TELEPHONE (OUTPATIENT)
Dept: FAMILY MEDICINE | Facility: CLINIC | Age: 72
End: 2022-06-14
Payer: COMMERCIAL

## 2022-06-14 DIAGNOSIS — Z53.9 DIAGNOSIS NOT YET DEFINED: Primary | ICD-10-CM

## 2022-06-14 PROCEDURE — G0180 MD CERTIFICATION HHA PATIENT: HCPCS | Performed by: PHYSICIAN ASSISTANT

## 2022-06-21 ENCOUNTER — TELEPHONE (OUTPATIENT)
Dept: FAMILY MEDICINE | Facility: CLINIC | Age: 72
End: 2022-06-21

## 2022-06-21 NOTE — TELEPHONE ENCOUNTER
Plan of Care received from [facility] for Sepideh's signature  Elizabeth Gomez/

## 2022-06-22 DIAGNOSIS — Z53.9 DIAGNOSIS NOT YET DEFINED: Primary | ICD-10-CM

## 2022-06-23 ENCOUNTER — TELEPHONE (OUTPATIENT)
Dept: FAMILY MEDICINE | Facility: CLINIC | Age: 72
End: 2022-06-23

## 2022-06-23 DIAGNOSIS — Z53.9 DIAGNOSIS NOT YET DEFINED: Primary | ICD-10-CM

## 2022-06-23 NOTE — TELEPHONE ENCOUNTER
Received 9 page fax for Home Health Certification and Plan of Care for Sepideh Burris to complete. Form in the in-basket at AA's desk.

## 2022-06-25 ENCOUNTER — MYC MEDICAL ADVICE (OUTPATIENT)
Dept: FAMILY MEDICINE | Facility: CLINIC | Age: 72
End: 2022-06-25

## 2022-06-27 NOTE — TELEPHONE ENCOUNTER
Sent a Mychart response, will monitor and process after pt responds, CJ out today  Franci Hanson RN, BSN  [facility]

## 2022-06-28 ENCOUNTER — ONCOLOGY VISIT (OUTPATIENT)
Dept: ONCOLOGY | Facility: CLINIC | Age: 72
End: 2022-06-28
Attending: STUDENT IN AN ORGANIZED HEALTH CARE EDUCATION/TRAINING PROGRAM
Payer: COMMERCIAL

## 2022-06-28 VITALS
HEART RATE: 71 BPM | RESPIRATION RATE: 18 BRPM | OXYGEN SATURATION: 98 % | BODY MASS INDEX: 24.4 KG/M2 | WEIGHT: 151.2 LBS | SYSTOLIC BLOOD PRESSURE: 120 MMHG | TEMPERATURE: 98.4 F | DIASTOLIC BLOOD PRESSURE: 78 MMHG

## 2022-06-28 DIAGNOSIS — Z53.9 DIAGNOSIS NOT YET DEFINED: Primary | ICD-10-CM

## 2022-06-28 DIAGNOSIS — Z96.649 STATUS POST TOTAL REPLACEMENT OF HIP, UNSPECIFIED LATERALITY: ICD-10-CM

## 2022-06-28 DIAGNOSIS — I89.0 LYMPHEDEMA OF LEFT LEG: Primary | ICD-10-CM

## 2022-06-28 DIAGNOSIS — Z96.652 STATUS POST TOTAL LEFT KNEE REPLACEMENT: ICD-10-CM

## 2022-06-28 PROCEDURE — G0463 HOSPITAL OUTPT CLINIC VISIT: HCPCS

## 2022-06-28 PROCEDURE — 99215 OFFICE O/P EST HI 40 MIN: CPT | Performed by: STUDENT IN AN ORGANIZED HEALTH CARE EDUCATION/TRAINING PROGRAM

## 2022-06-28 PROCEDURE — G0180 MD CERTIFICATION HHA PATIENT: HCPCS | Performed by: PHYSICIAN ASSISTANT

## 2022-06-28 ASSESSMENT — PAIN SCALES - GENERAL: PAINLEVEL: NO PAIN (0)

## 2022-06-28 NOTE — PROGRESS NOTES
[facility]   PM&R clinic note        Interval history:     Angeli Jacobson presents to clinic today for follow up reg her rehab needs.   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  Was last seen in clinic on 3/1/22.  Recommendations included:  1. Work-up: None  2. Therapy/equipment/braces: Referral placed for lymphedema therapy in [place]  3. Medications: None  4. Interventions: Continue wearing compression stockings on LLE daily. Use pump 3x a week, discussed the importance of consistency to help prevent lymphedema flairs.   5. Referral / follow up with other providers: None  6. Follow up: RTC w/ Dr. Sharma at the end of June, after anticipated ortho surgery in May.       Symptoms,  Patient presents for a return visit today with her  present for the visit.  She had a left knee replacement after her last visit with me.  She has been recovering from this, and this was about 1 month ago at [facility].  She had some postoperative swelling that compounded her left lower extremity lymphedema after surgery.  She has been wrapping during the day and takes off wrap at night.  In addition she has been wearing compression as well.  She has been having home physical therapy to help with her postoperative recovery, and had 2-3 sessions of a home lymphedema therapist.  She states that she has occasionally done the lymphedema pump but has not gotten back to 3 times per week yet.  She has been working with PT twice weekly for mobility needs.,  And states that this has been going well.  She complains of exacerbated swelling behind the left knee and up into left thigh postoperatively.  She has had 2  postoperative visits with her surgeon, and surgical site is healing well.  She has also been doing the CPM machine daily.  She will be discharged from home therapy in a few weeks and will be starting PT at [facility] and continue lymphedema therapy within our institution.      Therapies/HEP,  Continues with home-based PT, and will be transitioning to outpatient PT and outpatient lymphedema therapy soon.      Functionally,   Patient remains independent with mobility, ADLs and IADLs with assist from her  as needed.      Social history is unchanged.        Medications:  Current Outpatient Medications   Medication Sig Dispense Refill     aspirin (ASA) 81 MG chewable tablet Take 81 mg by mouth daily       calcium carbonate 600 mg-vitamin D 400 units (CALTRATE) 600-400 MG-UNIT per tablet Take 1 chew tab by mouth daily       clopidogrel (PLAVIX) 75 MG tablet TAKE 1 TABLET BY MOUTH EVERY DAY 90 tablet 3     econazole nitrate 1 % external cream Apply topically daily (Patient taking differently: Apply topically daily Toes) 85 g 3     evolocumab (REPATHA) 140 MG/ML prefilled autoinjector Inject 1 mL (140 mg) Subcutaneous every 14 days 6 mL 3     ezetimibe (ZETIA) 10 MG tablet Take 1 tablet (10 mg) by mouth daily 90 tablet 3     levothyroxine (SYNTHROID/LEVOTHROID) 75 MCG tablet Take 1 tablet (75 mcg) by mouth daily 90 tablet 3     multivitamin, therapeutic (THERA-VIT) TABS tablet Take 1 tablet by mouth daily       nitroFURantoin macrocrystal-monohydrate (MACROBID) 100 MG capsule TAKE 1 CAPSULE BY MOUTH AFTER INTRCOURSE 30 capsule 3     rosuvastatin (CRESTOR) 20 MG tablet Take 1 tablet (20 mg) by mouth daily 90 tablet 3     solifenacin (VESICARE) 5 MG tablet Take 1 tablet (5 mg) by mouth daily 90 tablet 0     Vitamin D3 (CHOLECALCIFEROL) 25 mcg (1000 units) tablet Take 2 tablets (50 mcg) by mouth daily 60 tablet 0     acetaminophen (TYLENOL) 325 MG tablet Take 2 tablets (650 mg) by mouth every 4 hours as needed  for other (mild pain) 100 tablet 0     order for DME Equipment being ordered: Left Thigh High Compression Stocking, 550 CCL.3 1 each 99     ORDER FOR DME Equipment being ordered: lymphedema bandages 2 Device 1     oxyCODONE (ROXICODONE) 5 MG tablet Take 1-2 tablets (5-10 mg) by mouth every 4 hours as needed for moderate to severe pain 20 tablet 0     senna-docusate (SENOKOT-S/PERICOLACE) 8.6-50 MG tablet Take 1-2 tablets by mouth 2 times daily Take while on oral narcotics to prevent or treat constipation. 30 tablet 0              Physical Exam:   /78   Pulse 71   Temp 98.4  F (36.9  C) (Oral)   Resp 18   Wt 68.6 kg (151 lb 3.2 oz)   LMP 03/18/2005   SpO2 98%   BMI 24.40 kg/m    Gen: NAD, pleasant and cooperative   HEENT: MMM  Cardio: regular pulse  Pulm: non-labored breathing in room air  Abd: benign  Ext: Mild lymphedema in LLE extending from thigh to foot. Scar tissue and tightness noted at anterior left thigh. Positive Stemmer sign on left.   Mildly exacerbated swelling behind left knee and in left posterior thigh.  Well-healed surgical incision over left knee. no surrounding erythema, tenderness or swelling around incision site.  Neuro/MSK: Limited ROM w/ hip flexion, short 20 degrees of full flexion, likely 2/2 scar tissue. Limited ROM at knee, able to flex to 20 degrees, able to fully extend.      Labs/Imaging:  Lab Results   Component Value Date    WBC 6.3 05/26/2022    HGB 11.3 (L) 06/01/2022    HCT 43.4 05/26/2022    MCV 94 05/26/2022     06/01/2022     Lab Results   Component Value Date     05/26/2022    POTASSIUM 3.9 05/26/2022    CHLORIDE 106 05/26/2022    CO2 29 05/26/2022     (H) 06/01/2022     Lab Results   Component Value Date    GFRESTIMATED >90 05/31/2022    GFRESTBLACK >90 05/17/2021     Lab Results   Component Value Date    AST 18 03/02/2022    ALT 25 03/02/2022    ALKPHOS 87 03/02/2022    BILITOTAL 0.3 03/02/2022    BILICONJ 0.0 06/06/2006     Lab Results    Component Value Date    INR 1.01 09/27/2019     Lab Results   Component Value Date    BUN 17 05/26/2022    CR 0.58 05/31/2022              Assessment/Plan   Angeli Jacobson presents to clinic today for follow up reg her rehab needs.   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 Was last seen in clinic on 3/1/22.  As discussed with Angeli and her , she is doing well postoperatively from a rehabilitation perspective.  She presents with slightly exacerbated lymphedema in her left lower extremity, and should start using pump more regularly at 3 times weekly in addition to her daily stretching and wrapping/compression.  She should keep her upcoming outpatient lymphedema therapy appointments with Mariana Guzman, and start outpatient physical therapy at [facility] once discharged from home therapy.  We will plan a 3-month follow-up.  Patient and her  are in agreement with the plan.      1. Work-up: None  2. Therapy/equipment/braces:    1. Restart lymphedema therapy in [place] as planned. Continue with wrapping, compression stockings. Restart using pump 3 x per week.   2.  Start outpatient physical therapy as planned at [facility] for left lower extremity.  3. Return to clinic with Dr. Sharma in 3 months.    Leeann Sharma MD  Physical Medicine & Rehabilitation      50 minutes spent on the date of the encounter doing chart review, history and exam, documentation and further activities as noted above.

## 2022-06-28 NOTE — PATIENT INSTRUCTIONS
1.  Restart outpatient lymphedema therapy as planned.  Restart using lymphedema pump 3 times per week if possible.  2.  Continue wrapping her left lower extremity and compression until follow-up with lymphedema therapy.  3.  Start outpatient physical therapy for left knee at [facility] as planned.  4.  Return to clinic with Dr. Sharma in 3 months.

## 2022-06-28 NOTE — LETTER
"    6/28/2022         RE: Angeli Jacobson  [id number]  [place] [id number]        Dear Colleague,    Thank you for referring your patient, Angeli Jacobson, to the [facility]. Please see a copy of my visit note below.    Oncology Rooming Note    June 28, 2022 1:55 PM   Angeli Jaocbson is a 72 year old female who presents for:    Chief Complaint   Patient presents with     Oncology Clinic Visit     Initial Vitals: /78   Pulse 71   Temp 98.4  F (36.9  C) (Oral)   Resp 18   Wt 68.6 kg (151 lb 3.2 oz)   LMP 03/18/2005   SpO2 98%   BMI 24.40 kg/m   Estimated body mass index is 24.4 kg/m  as calculated from the following:    Height as of 5/31/22: 1.676 m (5' 6\").    Weight as of this encounter: 68.6 kg (151 lb 3.2 oz). Body surface area is 1.79 meters squared.  No Pain (0) Comment: Data Unavailable   Patient's last menstrual period was 03/18/2005.  Allergies reviewed: Yes  Medications reviewed: Yes    Medications: Medication refills not needed today.  Pharmacy name entered into [employer]: [id number] [place], [place] - [address] [place] [address]    Clinical concerns: no       Shari J. Schoenberger, [facility]   PM&R clinic note        Interval history:     Angeli Jacobson presents to clinic today for follow up reg her rehab needs.   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  Was last seen in clinic on 3/1/22.  Recommendations included:  1. Work-up: None  2. Therapy/equipment/braces: Referral placed for lymphedema therapy in [place]  3. Medications: None  4. Interventions: Continue wearing compression stockings " on LLE daily. Use pump 3x a week, discussed the importance of consistency to help prevent lymphedema flairs.   5. Referral / follow up with other providers: None  6. Follow up: RTC w/ Dr. Sharma at the end of June, after anticipated ortho surgery in May.       Symptoms,  Patient presents for a return visit today with her  present for the visit.  She had a left knee replacement after her last visit with me.  She has been recovering from this, and this was about 1 month ago at [facility].  She had some postoperative swelling that compounded her left lower extremity lymphedema after surgery.  She has been wrapping during the day and takes off wrap at night.  In addition she has been wearing compression as well.  She has been having home physical therapy to help with her postoperative recovery, and had 2-3 sessions of a home lymphedema therapist.  She states that she has occasionally done the lymphedema pump but has not gotten back to 3 times per week yet.  She has been working with PT twice weekly for mobility needs.,  And states that this has been going well.  She complains of exacerbated swelling behind the left knee and up into left thigh postoperatively.  She has had 2 postoperative visits with her surgeon, and surgical site is healing well.  She has also been doing the CPM machine daily.  She will be discharged from home therapy in a few weeks and will be starting PT at [facility] and continue lymphedema therapy within our institution.      Therapies/HEP,  Continues with home-based PT, and will be transitioning to outpatient PT and outpatient lymphedema therapy soon.      Functionally,   Patient remains independent with mobility, ADLs and IADLs with assist from her  as needed.      Social history is unchanged.        Medications:  Current Outpatient Medications   Medication Sig Dispense Refill     aspirin (ASA) 81 MG chewable tablet Take 81 mg by mouth daily       calcium carbonate 600 mg-vitamin D 400  units (CALTRATE) 600-400 MG-UNIT per tablet Take 1 chew tab by mouth daily       clopidogrel (PLAVIX) 75 MG tablet TAKE 1 TABLET BY MOUTH EVERY DAY 90 tablet 3     econazole nitrate 1 % external cream Apply topically daily (Patient taking differently: Apply topically daily Toes) 85 g 3     evolocumab (REPATHA) 140 MG/ML prefilled autoinjector Inject 1 mL (140 mg) Subcutaneous every 14 days 6 mL 3     ezetimibe (ZETIA) 10 MG tablet Take 1 tablet (10 mg) by mouth daily 90 tablet 3     levothyroxine (SYNTHROID/LEVOTHROID) 75 MCG tablet Take 1 tablet (75 mcg) by mouth daily 90 tablet 3     multivitamin, therapeutic (THERA-VIT) TABS tablet Take 1 tablet by mouth daily       nitroFURantoin macrocrystal-monohydrate (MACROBID) 100 MG capsule TAKE 1 CAPSULE BY MOUTH AFTER INTRCOURSE 30 capsule 3     rosuvastatin (CRESTOR) 20 MG tablet Take 1 tablet (20 mg) by mouth daily 90 tablet 3     solifenacin (VESICARE) 5 MG tablet Take 1 tablet (5 mg) by mouth daily 90 tablet 0     Vitamin D3 (CHOLECALCIFEROL) 25 mcg (1000 units) tablet Take 2 tablets (50 mcg) by mouth daily 60 tablet 0     acetaminophen (TYLENOL) 325 MG tablet Take 2 tablets (650 mg) by mouth every 4 hours as needed for other (mild pain) 100 tablet 0     order for DME Equipment being ordered: Left Thigh High Compression Stocking, 550 CCL.3 1 each 99     ORDER FOR DME Equipment being ordered: lymphedema bandages 2 Device 1     oxyCODONE (ROXICODONE) 5 MG tablet Take 1-2 tablets (5-10 mg) by mouth every 4 hours as needed for moderate to severe pain 20 tablet 0     senna-docusate (SENOKOT-S/PERICOLACE) 8.6-50 MG tablet Take 1-2 tablets by mouth 2 times daily Take while on oral narcotics to prevent or treat constipation. 30 tablet 0              Physical Exam:   /78   Pulse 71   Temp 98.4  F (36.9  C) (Oral)   Resp 18   Wt 68.6 kg (151 lb 3.2 oz)   LMP 03/18/2005   SpO2 98%   BMI 24.40 kg/m    Gen: NAD, pleasant and cooperative   HEENT: MMM  Cardio: regular  pulse  Pulm: non-labored breathing in room air  Abd: benign  Ext: Mild lymphedema in LLE extending from thigh to foot. Scar tissue and tightness noted at anterior left thigh. Positive Stemmer sign on left.   Mildly exacerbated swelling behind left knee and in left posterior thigh.  Well-healed surgical incision over left knee. no surrounding erythema, tenderness or swelling around incision site.  Neuro/MSK: Limited ROM w/ hip flexion, short 20 degrees of full flexion, likely 2/2 scar tissue. Limited ROM at knee, able to flex to 20 degrees, able to fully extend.      Labs/Imaging:  Lab Results   Component Value Date    WBC 6.3 05/26/2022    HGB 11.3 (L) 06/01/2022    HCT 43.4 05/26/2022    MCV 94 05/26/2022     06/01/2022     Lab Results   Component Value Date     05/26/2022    POTASSIUM 3.9 05/26/2022    CHLORIDE 106 05/26/2022    CO2 29 05/26/2022     (H) 06/01/2022     Lab Results   Component Value Date    GFRESTIMATED >90 05/31/2022    GFRESTBLACK >90 05/17/2021     Lab Results   Component Value Date    AST 18 03/02/2022    ALT 25 03/02/2022    ALKPHOS 87 03/02/2022    BILITOTAL 0.3 03/02/2022    BILICONJ 0.0 06/06/2006     Lab Results   Component Value Date    INR 1.01 09/27/2019     Lab Results   Component Value Date    BUN 17 05/26/2022    CR 0.58 05/31/2022              Assessment/Plan   Angeli Jacobson presents to clinic today for follow up reg her rehab needs.   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 Was last seen in clinic on 3/1/22.  As discussed with Angeli and her , she is doing well postoperatively from a rehabilitation perspective.  She presents with slightly  exacerbated lymphedema in her left lower extremity, and should start using pump more regularly at 3 times weekly in addition to her daily stretching and wrapping/compression.  She should keep her upcoming outpatient lymphedema therapy appointments with Mariana Guzman, and start outpatient physical therapy at [facility] once discharged from home therapy.  We will plan a 3-month follow-up.  Patient and her  are in agreement with the plan.      1. Work-up: None  2. Therapy/equipment/braces:    1. Restart lymphedema therapy in [place] as planned. Continue with wrapping, compression stockings. Restart using pump 3 x per week.   2.  Start outpatient physical therapy as planned at [facility] for left lower extremity.  3. Return to clinic with Dr. Sharma in 3 months.    Leeann Sharma MD  Physical Medicine & Rehabilitation      50 minutes spent on the date of the encounter doing chart review, history and exam, documentation and further activities as noted above.              Again, thank you for allowing me to participate in the care of your patient.        Sincerely,        Leeann Sharma MD

## 2022-06-28 NOTE — PROGRESS NOTES
"Oncology Rooming Note    June 28, 2022 1:55 PM   Angeli Jacobson is a 72 year old female who presents for:    Chief Complaint   Patient presents with     Oncology Clinic Visit     Initial Vitals: /78   Pulse 71   Temp 98.4  F (36.9  C) (Oral)   Resp 18   Wt 68.6 kg (151 lb 3.2 oz)   LMP 03/18/2005   SpO2 98%   BMI 24.40 kg/m   Estimated body mass index is 24.4 kg/m  as calculated from the following:    Height as of 5/31/22: 1.676 m (5' 6\").    Weight as of this encounter: 68.6 kg (151 lb 3.2 oz). Body surface area is 1.79 meters squared.  No Pain (0) Comment: Data Unavailable   Patient's last menstrual period was 03/18/2005.  Allergies reviewed: Yes  Medications reviewed: Yes    Medications: Medication refills not needed today.  Pharmacy name entered into [employer]: [id number] [place] [address]    Clinical concerns: no       Shari J. Schoenberger, [facility]              "

## 2022-07-08 ENCOUNTER — ALLIED HEALTH/NURSE VISIT (OUTPATIENT)
Dept: FAMILY MEDICINE | Facility: CLINIC | Age: 72
End: 2022-07-08
Payer: COMMERCIAL

## 2022-07-08 DIAGNOSIS — Z23 HIGH PRIORITY FOR 2019-NCOV VACCINE: Primary | ICD-10-CM

## 2022-07-08 PROCEDURE — 91306 COVID-19,PF,MODERNA (18+ YRS BOOSTER .25ML): CPT

## 2022-07-08 PROCEDURE — 99207 PR NO CHARGE NURSE ONLY: CPT

## 2022-07-08 PROCEDURE — 0064A COVID-19,PF,MODERNA (18+ YRS BOOSTER .25ML): CPT

## 2022-07-12 ENCOUNTER — TRANSFERRED RECORDS (OUTPATIENT)
Dept: HEALTH INFORMATION MANAGEMENT | Facility: CLINIC | Age: 72
End: 2022-07-12

## 2022-07-18 ENCOUNTER — OFFICE VISIT (OUTPATIENT)
Dept: FAMILY MEDICINE | Facility: CLINIC | Age: 72
End: 2022-07-18

## 2022-07-18 ENCOUNTER — ANCILLARY PROCEDURE (OUTPATIENT)
Dept: GENERAL RADIOLOGY | Facility: CLINIC | Age: 72
End: 2022-07-18
Attending: PHYSICIAN ASSISTANT
Payer: COMMERCIAL

## 2022-07-18 VITALS
WEIGHT: 147.8 LBS | HEART RATE: 89 BPM | BODY MASS INDEX: 23.86 KG/M2 | TEMPERATURE: 97.9 F | DIASTOLIC BLOOD PRESSURE: 78 MMHG | SYSTOLIC BLOOD PRESSURE: 139 MMHG | OXYGEN SATURATION: 99 % | RESPIRATION RATE: 16 BRPM

## 2022-07-18 DIAGNOSIS — M81.0 AGE-RELATED OSTEOPOROSIS WITHOUT CURRENT PATHOLOGICAL FRACTURE: ICD-10-CM

## 2022-07-18 DIAGNOSIS — M25.552 HIP PAIN, LEFT: Primary | ICD-10-CM

## 2022-07-18 PROCEDURE — 72170 X-RAY EXAM OF PELVIS: CPT | Mod: TC | Performed by: RADIOLOGY

## 2022-07-18 PROCEDURE — 99214 OFFICE O/P EST MOD 30 MIN: CPT | Performed by: PHYSICIAN ASSISTANT

## 2022-07-18 RX ORDER — IBUPROFEN 200 MG
400 TABLET ORAL EVERY 8 HOURS
COMMUNITY
End: 2022-12-08

## 2022-07-18 RX ORDER — ACETAMINOPHEN 325 MG/1
650 TABLET ORAL EVERY 8 HOURS
COMMUNITY
End: 2022-12-08

## 2022-07-18 NOTE — PATIENT INSTRUCTIONS
Can go up to 3 tablets of Tylenol as needed for pain (975 mg every 8 hours).   Can take ibuprofen 2 tablets (400 mg every 8 hours).    If having any bleeding/bruising concerns stop ibuprofen and call clinic.    [facility] [phone] or [phone]

## 2022-07-18 NOTE — PROGRESS NOTES
Assessment & Plan     Hip pain, left  Due to patient's history of osteoporosis fracture is a concern. Occult fracture still possible though x-ray today negative. I recommended another 1-2 weeks of recovery and if not improving obtain MRI. MRI ordered today and recommended patient get this scheduled now for 1-2 weeks from now.  She has been doing PT since the fall (and even followed up with her surgery team last week). Continue PT as tolerated and instructed by surgery and PT team.  Continue with pain control as she has been (alternating ibuprofen and Tylenol every 4 hours, see patient instructions).  If MRI obtained and shows fracture follow up with orthopedics.  - XR Pelvis 1/2 Views  - MR Pelvis Bone wo Contrast; Future    Age-related osteoporosis without current pathological fracture        Review of the result(s) of each unique test - x-ray pelvis  Ordering of each unique test  Prescription drug management  38 minutes spent on the date of the encounter doing chart review, history and exam, documentation and further activities per the note       Patient Instructions   Can go up to 3 tablets of Tylenol as needed for pain (975 mg every 8 hours).   Can take ibuprofen 2 tablets (400 mg every 8 hours).    If having any bleeding/bruising concerns stop ibuprofen and call clinic.    [facility] [phone] or [phone]        Return if symptoms worsen or fail to improve.    Muriel Orosco PA-C  [facility]    Carmel Suh is a 72 year old accompanied by her spouse, Chris, presenting for the following health issues:  Fall      Fall    History of Present Illness       Reason for visit:  To check a possible pelvis injury  Symptoms include:  Very sore unless sitting. Walking more difficult; turning in bed painful;    She eats 2-3 servings of fruits and vegetables daily.She consumes 0 sweetened beverage(s) daily.She exercises with enough effort to increase  her heart rate 9 or less minutes per day.  She exercises with enough effort to increase her heart rate 3 or less days per week.   She is taking medications regularly.     The patient sustained a fall on 7/6/22. She fell onto tiled floor from a standing position. She felt like her knees were pushed out from under her (she fell backwards). She is using a CPM machine as a part of recovery from her surgery 5/31/22 (quadplalsty, has also had history of femur fracture status post repair and replacement om 2019). Since the fall she has been more sore on the left side after using the machine. Pain can get as bad as 8/10 in severity.    Taking Tylenol (650 mg) and ibuprofen (200 mg) alternating (every 4 hours)      Review of Systems   GENERAL:  No fevers  MUSCULOSKELETAL: As noted in HPI          Objective    /78 (BP Location: Right arm, Patient Position: Sitting, Cuff Size: Adult Regular)   Pulse 89   Temp 97.9  F (36.6  C) (Oral)   Resp 16   Wt 67 kg (147 lb 12.8 oz)   LMP 03/18/2005   SpO2 99%   BMI 23.86 kg/m    Body mass index is 23.86 kg/m .  Physical Exam   GENERAL: No acute distress  HEENT: Normocephalic  MSK: No midline lumbar tenderness or sacral tenderness. No ecchymosis noted on the left buttocks. Unable to reproduce any bony tenderness with palpation.  NEURO: Alert and non-focal      XR PELVIS 1/2 VW 7/18/2022 9:52 AM      HISTORY: Fall 7/8/22, left sided pelvic pain in the back of the  hip/back; Hip pain, left     COMPARISON: 10/4/2019                                                                      IMPRESSION: Left GISSELL with plate and cerclage wire across the proximal  femoral fracture which has healed in the interval. Postoperative air  has resolved. Osteopenia. No acute fractures are evident. Vascular  stents.              .  ..

## 2022-07-20 ENCOUNTER — HOSPITAL ENCOUNTER (OUTPATIENT)
Dept: PHYSICAL THERAPY | Facility: CLINIC | Age: 72
Discharge: HOME | End: 2022-07-20
Payer: COMMERCIAL

## 2022-07-20 DIAGNOSIS — I89.0 LYMPHEDEMA OF LEFT LEG: Primary | ICD-10-CM

## 2022-07-20 DIAGNOSIS — Z98.890 S/P ORIF (OPEN REDUCTION INTERNAL FIXATION) FRACTURE: ICD-10-CM

## 2022-07-20 DIAGNOSIS — L90.5 SCAR CONDITION AND FIBROSIS OF SKIN: ICD-10-CM

## 2022-07-20 DIAGNOSIS — T82.898S OCCLUSION OF LEFT FEMOROPOPLITEAL BYPASS GRAFT, SEQUELA: ICD-10-CM

## 2022-07-20 DIAGNOSIS — M79.662 PAIN OF LEFT LOWER LEG: ICD-10-CM

## 2022-07-20 DIAGNOSIS — Z87.81 S/P ORIF (OPEN REDUCTION INTERNAL FIXATION) FRACTURE: ICD-10-CM

## 2022-07-20 DIAGNOSIS — Z96.649 STATUS POST TOTAL REPLACEMENT OF HIP, UNSPECIFIED LATERALITY: ICD-10-CM

## 2022-07-20 PROCEDURE — 97161 PT EVAL LOW COMPLEX 20 MIN: CPT | Mod: GP | Performed by: PHYSICAL THERAPIST

## 2022-07-20 PROCEDURE — 97140 MANUAL THERAPY 1/> REGIONS: CPT | Mod: GP | Performed by: PHYSICAL THERAPIST

## 2022-07-20 NOTE — PROGRESS NOTES
[facility]        OUTPATIENT PHYSICAL THERAPY EDEMA EVALUATION  PLAN OF TREATMENT FOR OUTPATIENT REHABILITATION  (COMPLETE FOR INITIAL CLAIMS ONLY)  Patient's Last Name, First Name, TURNERIVAN  KavonAngeli  PARTH                           Provider s Name:   [facility] Medical Record No.  [id number]     Start of Care Date:  07/20/22   Onset Date:  05/31/22 (exacerbation following surgery and with recent heat)   Type:  PT   Medical Diagnosis:  lymphedema, fibrosis, pain   Therapy Diagnosis:  lymphedema, fibrosis, pain Visits from SOC:  1                                     __________________________________________________________________________________   Plan of Treatment/Functional Goals:    Manual lymph drainage, Gradient compression bandaging, Fit for compression garment, Exercises, Precautions to prevent infection / exacerbation, Education, Manual therapy, ADL training, Skin care / precautions, Scar mobilization, Soft tissue mobilization, Home management program development  focus on updated home program, MLD to assist with exacerbation of lymphedema following surgery     GOALS  1. Goal description: Paqtient independnet in home program to manage condition of L LE lymphedema       Target date: 10/18/22  2. Goal description: Decrease volume L LE by 10% nearing volume presurgery and decreasing risk of infection and improving L knee mobilitiy       Target date: 09/18/22                       Treatment Frequency: 1x/week   Treatment duration: x 4 weeks then decrease to 2x/month x 2 month; may consider short intensive CLT if indicated at later date    Mariana Guzman, PT                                    I CERTIFY THE NEED FOR THESE SERVICES FURNISHED UNDER        THIS PLAN OF TREATMENT AND WHILE UNDER MY CARE     (Physician co-signature of this document indicates review and  certification of the therapy plan).                   Certification date from: 07/20/22       Certification date to: 10/18/22           Referring physician: Dr. Leeann Sharma   Initial Assessment  See Epic Evaluation- Start of care: 07/20/22

## 2022-07-20 NOTE — PROGRESS NOTES
07/20/22 1300   Quick Adds   Quick Adds Certification   Rehab Discipline   Discipline PT   Type of Visit   Type of visit Initial Edema Evaluation   General Information   Start of care 07/20/22   Referring physician Dr. 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   Pertinent history of current problem (PT: include personal factors and/or comorbidities that impact the POC; OT: include additional occupational profile info) complex medical history, chronicity of problem, chronic pain   Surgical / medical history reviewed Yes   Prior level of functional mobility I ADLs; able to ambulate short distances in home and to appoitments since fall using SEC; prior to fall did not use cane.   Prior treatment Complete decongestive therapy   Community support Family / friend caregiver  ()   Current assistive devices Standard cane   General observations GCB L LE 3x/week x 12 hours; compression pump 3x/week, edema exs daily.  reports inital decrease in edema following surgery but stalling of reduction followeing fall and recent heat wave; use of compression stocking when not in GCB   Fall Risk Screen   Fall screen completed by PT   Have you fallen 2 or more times in the past year? No   Have you fallen and had an injury in the past year? Yes   Is patient a fall risk? Yes;Department fall risk interventions implemented   Fall  screen comments s/p fall 7/8/2022 likley due quad fatigue of L LE current xray negative and scheduled for MRI in August if limited improvement in pain   Abuse Screen (yes response referral indicated)   Feels Unsafe at Home or Work/School no   Feels Threatened by Someone no   Does Anyone Try to Keep You From Having Contact with Others or Doing Things Outside Your Home? no   Physical Signs of Abuse Present no   Patient needs abuse support services and resources No   Subjective Report   Patient report of symptoms exacerbation of chronic L LE lymphedema following surgery and recent fall and heat wave   Patient / Family Goals   Patient / family goals statement decreased edema   Pain   Pain comments L glut and hip 0-5 worse with turning in bed or lifting leg in/out of bed   Vitals Signs   Weight 147   Edema Exam / Assessment   Skin condition Pitting;Intact   Skin condition comments 2+ L pretibial; 1+ L dorsal foot; nonpitting rubbery fibrosis thorughout L LE extending to thigh   Pitting 2+;1+   Pitting location 2+ pretibial; 1+ dorsal foot amd distal thigh; nonpitting boggy edema of knee   Scar Yes   Location anteiror knee   Mobility moderately adhered   Capillary refill Symmetrical   Stemmer sign Positive   Girth Measurements   Girth Measurements Refer to separate girth measurement flowsheet   Range of Motion   ROM comments L knee 10-75   Strength   Strength comments quad lag with SLR   Gait / Locomotion   Gait / Locomotion decreased pace, antalgic; use of SEC   Sensory   Sensory perception comments decreased light touch L foot; worsening in past couple years   Planned Edema Interventions   Planned edema interventions Manual lymph drainage;Gradient compression bandaging;Fit for compression garment;Exercises;Precautions to prevent infection / exacerbation;Education;Manual therapy;ADL training;Skin care / precautions;Scar mobilization;Soft tissue mobilization;Home management program development   Planned edema intervention  comments focus on updated home program, MLD to assist with exacerbation of lymphedema following surgery   Clinical Impression   Criteria for skilled therapeutic intervention met Yes   Therapy diagnosis lymphedema, fibrosis, pain   Influenced by the following impairments / conditions Edema;Stage 2   Functional limitations due to impairments / conditions elevated risk of infections, decreased L knee ROM limiting functional mobilty and gait mechanics   Clinical Presentation Stable/Uncomplicated   Clinical Presentation Rationale pt reporting current stable status   Clinical Decision Making (Complexity) Low complexity   Treatment Frequency 1x/week   Treatment duration x 4 weeks then decrease to 2x/month x 2 month; may consider short intensive CLT if indicated at later date   Patient / family and/or staff in agreement with plan of care Yes   Risks and benefits of therapy have been explained Yes   Clinical impression comments pt with chronic L LE lymphedema and fibrosis with exacerbation following L knee surgery 5/31/2022 and s/p fall 7/8/2022 and recent heat wave and would benefit from therpay to achieve stated goals   Education Assessment   Preferred learning style Listening;Reading;Demonstration   Barriers to learning No barriers   Goal 1   Goal identifier Home program   Goal description Paqtient independnet in home program to manage condition of L LE lymphedema   Target date 10/18/22   Goal 2   Goal identifier volume L LE   Goal description Decrease volume L LE by 10% nearing volume presurgery and decreasing risk of infection and improving L knee mobilitiy   Target date 09/18/22   Total Evaluation Time   PT Eval, Low Complexity Minutes (44189) 20   Certification   Certification date from 07/20/22   Certification date to 10/18/22   Medical Diagnosis lymphedema, fibrosis, pain   Certification I certify the need for these services furnished under this plan of treatment and while under my care.  (Physician co-signature of  this document indicates review and certification of the therapy plan).

## 2022-07-22 ENCOUNTER — OFFICE VISIT (OUTPATIENT)
Dept: UROLOGY | Facility: CLINIC | Age: 72
End: 2022-07-22
Payer: COMMERCIAL

## 2022-07-22 VITALS
HEIGHT: 66 IN | SYSTOLIC BLOOD PRESSURE: 124 MMHG | WEIGHT: 147 LBS | DIASTOLIC BLOOD PRESSURE: 70 MMHG | BODY MASS INDEX: 23.63 KG/M2

## 2022-07-22 DIAGNOSIS — N32.81 OAB (OVERACTIVE BLADDER): Primary | ICD-10-CM

## 2022-07-22 DIAGNOSIS — N81.9 FEMALE GENITAL PROLAPSE, UNSPECIFIED TYPE: ICD-10-CM

## 2022-07-22 DIAGNOSIS — M62.89 PELVIC FLOOR DYSFUNCTION: ICD-10-CM

## 2022-07-22 LAB — RESIDUAL VOLUME (RV) (EXTERNAL): 199

## 2022-07-22 PROCEDURE — 51798 US URINE CAPACITY MEASURE: CPT | Performed by: PHYSICIAN ASSISTANT

## 2022-07-22 PROCEDURE — 99214 OFFICE O/P EST MOD 30 MIN: CPT | Mod: 25 | Performed by: PHYSICIAN ASSISTANT

## 2022-07-22 RX ORDER — SOLIFENACIN SUCCINATE 5 MG/1
5 TABLET, FILM COATED ORAL DAILY
Qty: 90 TABLET | Refills: 3 | Status: SHIPPED | OUTPATIENT
Start: 2022-07-22 | End: 2023-08-22

## 2022-07-22 ASSESSMENT — PAIN SCALES - GENERAL: PAINLEVEL: MILD PAIN (2)

## 2022-07-22 NOTE — NURSING NOTE
Chief Complaint   Patient presents with     Over Active Bladder     Pt. Reports no current bothersome symptoms.  Medication takes care of urinary incontinence pretty well.    PVR: 199 mL    Viridiana Aquino, EMT

## 2022-07-22 NOTE — PATIENT INSTRUCTIONS
Follow up in 2 month with a nurse visit for post void residual check to ensure that it is not worsening.    Continue with Vesicare 5 mg once daily.  Refills provided.  You have been prescribed medication to help relax your bladder.    This medication may help control (or improve) urinary frequency, urgency and urge incontinence.    Common side effects include:  Dry mouth (Biotene mouthwash can help with this.)  Constipation  Drowsiness/Mental Fogginess  Blurred vision/Dry Eyes  Difficulty with sweating    Rare side effect:  Urinary retention    If you have the interest/time for pelvic PT, contact us for a referral for pelvic floor PT for prolapse and overactive bladder with pelvic floor dysfunction.   [phone]     Continue with timed voiding and would recommend intention double voiding and shifting positions.    Tentative follow up office visit with UA and post void residual in 1 year.    Contact us in the interim with questions, concerns, or changes in symptomatology.

## 2022-07-22 NOTE — LETTER
7/22/2022       RE: Angeli Jacobson  [id number]  [facility] [id number]     Dear Colleague,    Thank you for referring your patient, Angeli Jacobson, to the [facility] [place] at [facility]. Please see a copy of my visit note below.    Subjective      CHIEF COMPLAINT/REASON FOR VISIT   Patient of Dr. Oquendo's seen on my calendar  Follow up overactive bladder     HISTORY OF PRESENT ILLNESS   Ms. Jacobson is a very pleasant 72-year-old female, who presents today for follow-up regarding overactivity of the bladder.  She was last seen by Dr. Oquendo on 07/21/2021.    She was prescribed Toviaz 4 mg at her last visit, but this was switched to Vesicare 5 mg due to insurance coverage.  She has previously gone to pelvic floor physical therapy for pelvic floor dysfunction and had last asked for a referral for refresher sessions.    Since she was last seen, patient notes that she is doing pretty well.  She does have some left lower extremity edema and utilizes a walker.  Postvoid residual today is 199 mL.    Patient does note that she does not have much urge, but she is not able to move as fast in the morning and has more difficulties with urinary symptoms.  She does not use pads.  She does note some concern about prolapse.    The following portions of the patient's history were reviewed and updated as appropriate: allergies, current medications, past family history, past medical history, past social history, past surgical history, and problem list.     REVIEW OF SYSTEMS   Review of Systems   Genitourinary: Negative for dysuria, hematuria and urgency.      Per HPI.     Patient Active Problem List   Diagnosis     MULTINODUL GOITER     Hearing loss     Hyperlipidemia LDL goal <70     Osteoporosis     Impaired fasting glucose     Lymphedema of left leg     Tubular adenoma     Chronic pain of left knee     Vertigo     Myxoid liposarcoma (HCC)      PAD (peripheral artery disease) (H)     Vascular graft occlusion (H)     Femoral-popliteal bypass graft occlusion, left (H)     History of sarcoma     S/P ORIF (open reduction internal fixation) fracture     Hip pain, left     Postural urinary incontinence     Personal history of urinary tract infection     OAB (overactive bladder)     Myalgia of pelvic floor     Lesion of bladder     S/P total hip arthroplasty     Acute pain of left knee     Closed fracture of left tibia and fibula, initial encounter     Closed displaced fracture of left patella, unspecified fracture morphology, initial encounter     Other specified injury of left quadriceps muscle, fascia and tendon, initial encounter     Elevated coronary artery calcium score=7/1/21      Arthrofibrosis of knee joint, left      Past Medical History:   Diagnosis Date     * * * SBE PROPHYLAXIS * * * 1998    Amox 500mg, take 4 tabs one hour prior to procedure.Takes this because of lymphedema secondary from leg surgey     Antiplatelet or antithrombotic long-term use      Arrhythmia      Central serous retinopathy 2001    Resolved 9/2001     CHRONIC NECK PAIN 1995     Depressive disorder      Depressive disorder, not elsewhere classified 2001     Elevated coronary artery calcium score=7/1/21 08/03/2021     Elevated coronary artery calcium score=7/1/21 08/03/2021     History of blood transfusion 04/2019 12/2020    during left hip pinning, during surgery for patella     Lateral epicondylitis      MIXED HYPERLIPIDEMIA, LDL GOAL <160 1998    LDL goal < 160     Motion sickness      MYXOID LIPOSARCOMA 2000    Left thigh, S/P excision, radiation  at [facility]     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    CHRONIC LEFT THIGH LYMPHEDEMA     Nontoxic multinodular goiter 2005    needs yearly US     Osteoporosis, unspecified 2001     Other chronic pain     fx ribs left      Other lymphedema 2000    left thigh, gets regular PT for this     Overactive bladder      PAD (peripheral artery  "disease) (H) 04/20/2018     PONV (postoperative nausea and vomiting)      SHINGLES 2001     Sprain of lumbosacral (joint) (ligament) 1995    right     Unspecified hearing loss 1998    chronic tinnitus     Unspecified tinnitus 1998        Objective      PHYSICAL EXAM   /70   Ht 1.676 m (5' 6\")   Wt 66.7 kg (147 lb)   LMP 03/18/2005   BMI 23.73 kg/m     Physical Exam  Constitutional:       Appearance: Normal appearance.   HENT:      Head: Normocephalic.      Nose: Nose normal.   Eyes:      General: No scleral icterus.  Pulmonary:      Effort: Pulmonary effort is normal.   Musculoskeletal:      Left lower leg: Edema present.      Comments: Ambulates with a walker.   Neurological:      Mental Status: She is alert and oriented to person, place, and time. Mental status is at baseline.   Psychiatric:         Mood and Affect: Mood normal.         Behavior: Behavior normal.       LABORATORY     Unable to leave a urine today.    TESTING    PVR: 199 mL    Assessment & Plan    1. OAB (overactive bladder)    2. Pelvic floor dysfunction    3. Female genital prolapse, unspecified type      I had the pleasure today of meeting with Ms. Jacobson to discuss her overactivity of the bladder.  She feels like she is doing relatively well on Vesicare 5 mg daily.  Her postvoid residual is elevated today to 199 mL.    -We will plan on continuing Vesicare 5 mg once daily.  Refills provided.    -Given the change in a postvoid residual, have recommended she follow-up with a nurse visit in 2 months for postvoid residual to ensure that this is not worsening.    -We discussed continuing with timed voiding and would recommend intentional double voiding and shifting positions to more completely empty the bladder.    -If you have the interest/time for pelvic PT, contact us for a referral for pelvic floor PT for prolapse and overactive bladder with pelvic floor dysfunction.   [phone]     -Tentative follow up office visit with UA and post " void residual in 1 year.    Contact us in the interim with questions, concerns, or changes in symptomatology.      Signed by:       Nancy Chawla PA-C 7/22/2022 2:52 PM

## 2022-07-22 NOTE — PROGRESS NOTES
Subjective      CHIEF COMPLAINT/REASON FOR VISIT   Patient of Dr. Oquendo's seen on my calendar  Follow up overactive bladder     HISTORY OF PRESENT ILLNESS   Ms. Jacobson is a very pleasant 72-year-old female, who presents today for follow-up regarding overactivity of the bladder.  She was last seen by Dr. Oquendo on 07/21/2021.    She was prescribed Toviaz 4 mg at her last visit, but this was switched to Vesicare 5 mg due to insurance coverage.  She has previously gone to pelvic floor physical therapy for pelvic floor dysfunction and had last asked for a referral for refresher sessions.    Since she was last seen, patient notes that she is doing pretty well.  She does have some left lower extremity edema and utilizes a walker.  Postvoid residual today is 199 mL.    Patient does note that she does not have much urge, but she is not able to move as fast in the morning and has more difficulties with urinary symptoms.  She does not use pads.  She does note some concern about prolapse.    The following portions of the patient's history were reviewed and updated as appropriate: allergies, current medications, past family history, past medical history, past social history, past surgical history, and problem list.     REVIEW OF SYSTEMS   Review of Systems   Genitourinary: Negative for dysuria, hematuria and urgency.      Per HPI.     Patient Active Problem List   Diagnosis     MULTINODUL GOITER     Hearing loss     Hyperlipidemia LDL goal <70     Osteoporosis     Impaired fasting glucose     Lymphedema of left leg     Tubular adenoma     Chronic pain of left knee     Vertigo     Myxoid liposarcoma (HCC)     PAD (peripheral artery disease) (H)     Vascular graft occlusion (H)     Femoral-popliteal bypass graft occlusion, left (H)     History of sarcoma     S/P ORIF (open reduction internal fixation) fracture     Hip pain, left     Postural urinary incontinence     Personal history of urinary tract infection     OAB (overactive  "bladder)     Myalgia of pelvic floor     Lesion of bladder     S/P total hip arthroplasty     Acute pain of left knee     Closed fracture of left tibia and fibula, initial encounter     Closed displaced fracture of left patella, unspecified fracture morphology, initial encounter     Other specified injury of left quadriceps muscle, fascia and tendon, initial encounter     Elevated coronary artery calcium score=7/1/21      Arthrofibrosis of knee joint, left      Past Medical History:   Diagnosis Date     * * * SBE PROPHYLAXIS * * * 1998    Amox 500mg, take 4 tabs one hour prior to procedure.Takes this because of lymphedema secondary from leg surgey     Antiplatelet or antithrombotic long-term use      Arrhythmia      Central serous retinopathy 2001    Resolved 9/2001     CHRONIC NECK PAIN 1995     Depressive disorder      Depressive disorder, not elsewhere classified 2001     Elevated coronary artery calcium score=7/1/21 08/03/2021     Elevated coronary artery calcium score=7/1/21 08/03/2021     History of blood transfusion 04/2019 12/2020    during left hip pinning, during surgery for patella     Lateral epicondylitis      MIXED HYPERLIPIDEMIA, LDL GOAL <160 1998    LDL goal < 160     Motion sickness      MYXOID LIPOSARCOMA 2000    Left thigh, S/P excision, radiation  at [facility]     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    CHRONIC LEFT THIGH LYMPHEDEMA     Nontoxic multinodular goiter 2005    needs yearly      Osteoporosis, unspecified 2001     Other chronic pain     fx ribs left      Other lymphedema 2000    left thigh, gets regular PT for this     Overactive bladder      PAD (peripheral artery disease) (H) 04/20/2018     PONV (postoperative nausea and vomiting)      SHINGLES 2001     Sprain of lumbosacral (joint) (ligament) 1995    right     Unspecified hearing loss 1998    chronic tinnitus     Unspecified tinnitus 1998        Objective      PHYSICAL EXAM   /70   Ht 1.676 m (5' 6\")   Wt 66.7 kg (147 lb)  "  LMP 03/18/2005   BMI 23.73 kg/m     Physical Exam  Constitutional:       Appearance: Normal appearance.   HENT:      Head: Normocephalic.      Nose: Nose normal.   Eyes:      General: No scleral icterus.  Pulmonary:      Effort: Pulmonary effort is normal.   Musculoskeletal:      Left lower leg: Edema present.      Comments: Ambulates with a walker.   Neurological:      Mental Status: She is alert and oriented to person, place, and time. Mental status is at baseline.   Psychiatric:         Mood and Affect: Mood normal.         Behavior: Behavior normal.       LABORATORY     Unable to leave a urine today.    TESTING    PVR: 199 mL    Assessment & Plan    1. OAB (overactive bladder)    2. Pelvic floor dysfunction    3. Female genital prolapse, unspecified type      I had the pleasure today of meeting with Ms. Jacobson to discuss her overactivity of the bladder.  She feels like she is doing relatively well on Vesicare 5 mg daily.  Her postvoid residual is elevated today to 199 mL.    -We will plan on continuing Vesicare 5 mg once daily.  Refills provided.    -Given the change in a postvoid residual, have recommended she follow-up with a nurse visit in 2 months for postvoid residual to ensure that this is not worsening.    -We discussed continuing with timed voiding and would recommend intentional double voiding and shifting positions to more completely empty the bladder.    -If you have the interest/time for pelvic PT, contact us for a referral for pelvic floor PT for prolapse and overactive bladder with pelvic floor dysfunction.   [phone]     -Tentative follow up office visit with UA and post void residual in 1 year.    Contact us in the interim with questions, concerns, or changes in symptomatology.      Signed by:       Nancy Chawla PA-C 7/22/2022 2:52 PM

## 2022-07-26 ENCOUNTER — MYC MEDICAL ADVICE (OUTPATIENT)
Dept: FAMILY MEDICINE | Facility: CLINIC | Age: 72
End: 2022-07-26

## 2022-07-26 ENCOUNTER — HOSPITAL ENCOUNTER (OUTPATIENT)
Dept: PHYSICAL THERAPY | Facility: CLINIC | Age: 72
Discharge: HOME | End: 2022-07-26
Payer: COMMERCIAL

## 2022-07-26 DIAGNOSIS — I89.0 LYMPHEDEMA OF LEFT LEG: ICD-10-CM

## 2022-07-26 DIAGNOSIS — L90.5 SCAR CONDITION AND FIBROSIS OF SKIN: Primary | ICD-10-CM

## 2022-07-26 DIAGNOSIS — M79.662 PAIN OF LEFT LOWER LEG: ICD-10-CM

## 2022-07-26 PROCEDURE — 97140 MANUAL THERAPY 1/> REGIONS: CPT | Mod: GP | Performed by: PHYSICAL THERAPIST

## 2022-07-27 ENCOUNTER — OFFICE VISIT (OUTPATIENT)
Dept: FAMILY MEDICINE | Facility: CLINIC | Age: 72
End: 2022-07-27
Payer: COMMERCIAL

## 2022-07-27 VITALS
WEIGHT: 152.6 LBS | SYSTOLIC BLOOD PRESSURE: 122 MMHG | DIASTOLIC BLOOD PRESSURE: 70 MMHG | RESPIRATION RATE: 16 BRPM | HEIGHT: 66 IN | TEMPERATURE: 98 F | BODY MASS INDEX: 24.53 KG/M2 | HEART RATE: 69 BPM | OXYGEN SATURATION: 97 %

## 2022-07-27 DIAGNOSIS — H25.9 AGE-RELATED CATARACT OF BOTH EYES, UNSPECIFIED AGE-RELATED CATARACT TYPE: ICD-10-CM

## 2022-07-27 DIAGNOSIS — Z01.818 PREOP GENERAL PHYSICAL EXAM: Primary | ICD-10-CM

## 2022-07-27 LAB
ERYTHROCYTE [DISTWIDTH] IN BLOOD BY AUTOMATED COUNT: 13.6 % (ref 10–15)
HCT VFR BLD AUTO: 41 % (ref 35–47)
HGB BLD-MCNC: 13.2 G/DL (ref 11.7–15.7)
MCH RBC QN AUTO: 30.3 PG (ref 26.5–33)
MCHC RBC AUTO-ENTMCNC: 32.2 G/DL (ref 31.5–36.5)
MCV RBC AUTO: 94 FL (ref 78–100)
PLATELET # BLD AUTO: 340 10E3/UL (ref 150–450)
RBC # BLD AUTO: 4.36 10E6/UL (ref 3.8–5.2)
WBC # BLD AUTO: 8.1 10E3/UL (ref 4–11)

## 2022-07-27 PROCEDURE — 99214 OFFICE O/P EST MOD 30 MIN: CPT | Performed by: PHYSICIAN ASSISTANT

## 2022-07-27 PROCEDURE — 85027 COMPLETE CBC AUTOMATED: CPT | Performed by: PHYSICIAN ASSISTANT

## 2022-07-27 PROCEDURE — 36415 COLL VENOUS BLD VENIPUNCTURE: CPT | Performed by: PHYSICIAN ASSISTANT

## 2022-07-27 NOTE — PATIENT INSTRUCTIONS
Preparing for Your Surgery  Getting started  A nurse will call you to review your health history and instructions. They will give you an arrival time based on your scheduled surgery time. Please be ready to share:    Your doctor's clinic name and phone number    Your medical, surgical and anesthesia history    A list of allergies and sensitivities    A list of medicines, including herbal treatments and over-the-counter drugs    Whether the patient has a legal guardian (ask how to send us the papers in advance)  Please tell us if you're pregnant--or if there's any chance you might be pregnant. Some surgeries may injure a fetus (unborn baby), so they require a pregnancy test. Surgeries that are safe for a fetus don't always need a test, and you can choose whether to have one.   If you have a child who's having surgery, please ask for a copy of Preparing for Your Child's Surgery.    Preparing for surgery    Within 30 days of surgery: Have a pre-op exam (sometimes called an H&P, or History and Physical). This can be done at a clinic or pre-operative center.  ? If you're having a , you may not need this exam. Talk to your care team.    At your pre-op exam, talk to your care team about all medicines you take. If you need to stop any medicines before surgery, ask when to start taking them again.  ? We do this for your safety. Many medicines can make you bleed too much during surgery. Some change how well surgery (anesthesia) drugs work.    Call your insurance company to let them know you're having surgery. (If you don't have insurance, call [phone].)    Call your clinic if there's any change in your health. This includes signs of a cold or flu (sore throat, runny nose, cough, rash, fever). It also includes a scrape or scratch near the surgery site.    If you have questions on the day of surgery, call your hospital or surgery center.  COVID testing  You may need to be tested for COVID-19 before having  surgery. If so, we will give you instructions.  Eating and drinking guidelines  For your safety: Unless your surgeon tells you otherwise, follow the guidelines below.    Eat and drink as usual until 8 hours before surgery. After that, no food or milk.    Drink clear liquids until 2 hours before surgery. These are liquids you can see through, like water, Gatorade and Propel Water. You may also have black coffee and tea (no cream or milk).    Nothing by mouth within 2 hours of surgery. This includes gum, candy and breath mints.    If you drink alcohol: Stop drinking it the night before surgery.    If your care team tells you to take medicine on the morning of surgery, it's okay to take it with a sip of water.  Preventing infection    Shower or bathe the night before and morning of your surgery. Follow the instructions your clinic gave you. (If no instructions, use regular soap.)    Don't shave or clip hair near your surgery site. We'll remove the hair if needed.    Don't smoke or vape the morning of surgery. You may chew nicotine gum up to 2 hours before surgery. A nicotine patch is okay.  ? Note: Some surgeries require you to completely quit smoking and nicotine. Check with your surgeon.    Your care team will make every effort to keep you safe from infection. We will:  ? Clean our hands often with soap and water (or an alcohol-based hand rub).  ? Clean the skin at your surgery site with a special soap that kills germs.  ? Give you a special gown to keep you warm. (Cold raises the risk of infection.)  ? Wear special hair covers, masks, gowns and gloves during surgery.  ? Give antibiotic medicine, if prescribed. Not all surgeries need antibiotics.  What to bring on the day of surgery    Photo ID and insurance card    Copy of your health care directive, if you have one    Glasses and hearing aides (bring cases)  ? You can't wear contacts during surgery    Inhaler and eye drops, if you use them (tell us about these when  you arrive)    CPAP machine or breathing device, if you use them    A few personal items, if spending the night    If you have . . .  ? A pacemaker, ICD (cardiac defibrillator) or other implant: Bring the ID card.  ? An implanted stimulator: Bring the remote control.  ? A legal guardian: Bring a copy of the certified (court-stamped) guardianship papers.  Please remove any jewelry, including body piercings. Leave jewelry and other valuables at home.  If you're going home the day of surgery    You must have a responsible adult drive you home. They should stay with you overnight as well.    If you don't have someone to stay with you, and you aren't safe to go home alone, we may keep you overnight. Insurance often won't pay for this.  After surgery  If it's hard to control your pain or you need more pain medicine, please call your surgeon's office.  Questions?   If you have any questions for your care team, list them here: _________________________________________________________________________________________________________________________________________________________________________ ____________________________________ ____________________________________ ____________________________________  For informational purposes only. Not to replace the advice of your health care provider. Copyright   2003, 2019 [facility]. All rights reserved. Clinically reviewed by Amanda Mcclure MD. [employer] 197319 - REV 07/21.

## 2022-07-27 NOTE — PROGRESS NOTES
[facility]  [id number] [place] [id number]  Phone: [phone]  Primary Provider: Sepideh Isabel  Pre-op Performing Provider: SEPIDEH ISABEL      PREOPERATIVE EVALUATION:  Today's date: 7/27/2022    Angeli Jacobson is a 72 year old female who presents for a preoperative evaluation.    Surgical Information:  Surgery/Procedure: cataract   Surgery Location: [facility]  Surgeon: Dr.David Rust  Surgery Date: 08/09/2022 left eye, 08/23/2022 for Right eye  Time of Surgery:  Where patient plans to recover: At home with family  Fax number for surgical facility: [phone]    Type of Anesthesia Anticipated: Choice    Assessment & Plan     The proposed surgical procedure is considered LOW risk.    Preop general physical exam    - CBC with platelets; Future  - CBC with platelets    Age-related cataract of both eyes, unspecified age-related cataract type      Risks and Recommendations:  The patient has the following additional risks and recommendations for perioperative complications:      Medication Instructions:   - Bleeding risk is low for this procedure (e.g. dental, skin, cataract).   - Statins: Continue taking on the day of surgery.     RECOMMENDATION:  APPROVAL GIVEN to proceed with proposed procedure, without further diagnostic evaluation.        Subjective     HPI related to upcoming procedure: bilateral cataracts      Preop Questions 7/20/2022   1. Have you ever had a heart attack or stroke? No   2. Have you ever had surgery on your heart or blood vessels, such as a stent placement, a coronary artery bypass, or surgery on an artery in your head, neck, heart, or legs? No   3. Do you have chest pain with activity? No   4. Do you have a history of  heart failure? No   5. Do you currently have a cold, bronchitis or symptoms of other infection? No   6. Do you have a cough, shortness of breath, or wheezing? No   7. Do you or anyone in your family have  previous history of blood clots? No   8. Do you or does anyone in your family have a serious bleeding problem such as prolonged bleeding following surgeries or cuts? No   9. Have you ever had problems with anemia or been told to take iron pills? YES - previous   10. Have you had any abnormal blood loss such as black, tarry or bloody stools, or abnormal vaginal bleeding? No   11. Have you ever had a blood transfusion? YES -    11a. Have you ever had a transfusion reaction? No   12. Are you willing to have a blood transfusion if it is medically needed before, during, or after your surgery? Yes   13. Have you or any of your relatives ever had problems with anesthesia? NO   14. Do you have sleep apnea, excessive snoring or daytime drowsiness? No   14a. Do you have a CPAP machine? No   15. Do you have any artifical heart valves or other implanted medical devices like a pacemaker, defibrillator, or continuous glucose monitor? No   16. Do you have artificial joints? YES -    17. Are you allergic to latex? No   18. Is there any chance that you may be pregnant? -       Health Care Directive:  Patient has a Health Care Directive on file      Preoperative Review of :   reviewed - no record of controlled substances prescribed.      Status of Chronic Conditions:  See problem list for active medical problems.  Problems all longstanding and stable, except as noted/documented.  See ROS for pertinent symptoms related to these conditions.      Review of Systems  CONSTITUTIONAL: NEGATIVE for fever, chills, change in weight  ENT/MOUTH: NEGATIVE for ear, mouth and throat problems  RESP: NEGATIVE for significant cough or SOB  CV: NEGATIVE for chest pain, palpitations or peripheral edema  GI: NEGATIVE for nausea, abdominal pain, heartburn, or change in bowel habits  MUSCULOSKELETAL: NEGATIVE for significant arthralgias or myalgia    Patient Active Problem List    Diagnosis Date Noted     Arthrofibrosis of knee joint, left 05/31/2022      Priority: Medium     Elevated coronary artery calcium score=7/1/21 08/03/2021     Priority: Medium     Other specified injury of left quadriceps muscle, fascia and tendon, initial encounter 07/28/2021     Priority: Medium     Closed fracture of left tibia and fibula, initial encounter 12/19/2020     Priority: Medium     Closed displaced fracture of left patella, unspecified fracture morphology, initial encounter 12/19/2020     Priority: Medium     Acute pain of left knee 10/29/2019     Priority: Medium     S/P total hip arthroplasty 10/04/2019     Priority: Medium     Myalgia of pelvic floor 09/11/2019     Priority: Medium     Lesion of bladder 09/11/2019     Priority: Medium     Postural urinary incontinence 07/17/2019     Priority: Medium     Personal history of urinary tract infection 07/17/2019     Priority: Medium     OAB (overactive bladder) 07/17/2019     Priority: Medium     S/P ORIF (open reduction internal fixation) fracture 05/02/2019     Priority: Medium     Hip pain, left 05/02/2019     Priority: Medium     History of sarcoma 01/21/2019     Priority: Medium     Femoral-popliteal bypass graft occlusion, 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     Priority: Medium     CHRONIC LEFT THIGH LYMPHEDEMA       Impaired fasting glucose 06/16/2010     Priority: Low     Hearing loss 01/29/2007     Priority: Low     Has hearing aids        Past Medical History:   Diagnosis Date     * * * SBE PROPHYLAXIS * * * 1998    Amox 500mg, take 4 tabs one hour prior to procedure.Takes this because of lymphedema secondary from leg surgey     Antiplatelet or antithrombotic long-term use      Arrhythmia      Central serous retinopathy 2001    Resolved 9/2001     CHRONIC NECK PAIN 1995     Depressive disorder      Depressive disorder, not elsewhere classified 2001     Elevated coronary artery calcium score=7/1/21 08/03/2021     Elevated coronary artery calcium score=7/1/21 08/03/2021     History of blood transfusion 04/2019 12/2020    during left hip pinning, during surgery for patella     Lateral epicondylitis      MIXED HYPERLIPIDEMIA, LDL GOAL <160 1998    LDL goal < 160     Motion sickness      MYXOID LIPOSARCOMA 2000    Left thigh, S/P excision, radiation  at [facility]     Myxoid liposarcoma ([facility]) 03/08/2004    CHRONIC LEFT THIGH LYMPHEDEMA     Nontoxic multinodular goiter 2005    needs yearly      Osteoporosis, unspecified 2001     Other chronic pain     fx ribs left      Other lymphedema 2000    left thigh, gets regular PT for this     Overactive bladder      PAD (peripheral artery disease) (H) 04/20/2018     PONV (postoperative nausea and vomiting)      SHINGLES 2001     Sprain of lumbosacral (joint) (ligament) 1995    right     Unspecified hearing loss 1998    chronic tinnitus     Unspecified tinnitus 1998     Past Surgical History:   Procedure Laterality Date     ARTHROPLASTY HIP Left 10/4/2019    Procedure: Removal of left  femoral hardware with a conversion to left total hip arthroplasty using a Biomet Annalisa femoral stem with an OsseoTi acetabular shell and a dual mobility bearing surface;  Surgeon: Spencer Celeste MD;  Location: [facility] OR     BIOPSY  April 2000     BYPASS GRAFT FEMOROPOPLITEAL Left 1/21/2019    Procedure: LEFT FEMORAL TO ABOVE KNEE POPLITEAL  BYPASS WITH POLYTETRAFLUOROETHYLENE GRAFT;  Surgeon: Shade Owens MD;  Location:  OR     COLONOSCOPY N/A 2/5/2016    Procedure: COMBINED COLONOSCOPY, SINGLE OR MULTIPLE BIOPSY/POLYPECTOMY BY BIOPSY;  Surgeon: Varun Stanley MD, MD;  Location:  GI     COLONOSCOPY N/A 12/10/2021    Procedure: COLONOSCOPY, WITH POLYPECTOMIES  USING COLD  SNARE,   CLIP APPLIED X2;  Surgeon: Dayton Luna MD;  Location:  GI     CYSTOSCOPY       EXCISION MALIG LESION>1.25CM  5/2000    Myxoid Liposarcoma       EXPLORE GROIN Right 5/1/2018    Procedure: EXPLORE GROIN;  EMERGENCY RIGHT FEMORAL EXPLORATION WITH FEMORAL ARTERY REPAIR.    EBL: 50mL;  Surgeon: Shade Owens MD;  Location:  OR     HC COLP CERVIX/UPPER VAGINA  07/1997    Negative     HC DILATION/CURETTAGE DIAG/THER NON OB  02/1997    Post menopausal bleeding on HRT, negative     HIP SURGERY Left 04/13/2019     IR ANGIOGRAM THROUGH CATHETER FOLLOW UP  12/20/2018     IR ANGIOGRAM THROUGH CATHETER FOLLOW UP  12/21/2018     IR LOWER EXTREMITY ANGIOGRAM LEFT  12/19/2018     OPEN REDUCTION INTERNAL FIXATION PATELLA Left 12/21/2020    Procedure: Left partial patella fracture excision with advancement of the quadriceps tendon;  Surgeon: Stephen Rhodes MD;  Location:  OR     OPEN REDUCTION INTERNAL FIXATION RODDING INTRAMEDULLARY TIBIA Left 12/21/2020    Procedure: Open reduction intramedullary nailing of left tibia fracture;  Surgeon: Stephen Rhodes MD;  Location:  OR     REMOVE HARDWARE KNEE Left 5/31/2022    Procedure: Left knee patella hardware removal;  Surgeon: Spencer Celeste MD;  Location:  [facility] OR     REPAIR TENDON QUADRICEPS Left 7/28/2021    Procedure: Left knee quadricepsplasty with intraoperative patellar fracture requiring open reduction and internal fixation of the patella;  Surgeon: Spencer Celeste MD;  Location: [facility] OR     REPAIR TENDON QUADRICEPS Left 5/31/2022    Procedure: Open left knee VY quadricepsplasty with hardware removal and allograft;  Surgeon: Spencer Celeste MD;  Location: [facility] OR     VASCULAR SURGERY  04/30/2018    Left SFA stent in bypass graft     Z APPENDECTOMY      Appendectomy     [facility] NONSPECIFIC PROCEDURE  04/2000    Open Biopsy Left Thigh Liposarcoma     ZRUST COLONOSCOPY THRU STOMA, DIAGNOSTIC  2006    due 2010     Current Outpatient Medications   Medication Sig Dispense Refill     acetaminophen (TYLENOL) 325 MG tablet Take 650 mg by mouth every 8 hours       aspirin (ASA) 81 MG chewable tablet Take 81 mg by mouth daily       calcium carbonate 600 mg-vitamin D 400 units (CALTRATE) 600-400 MG-UNIT per tablet Take 1 chew tab by mouth daily       clopidogrel (PLAVIX) 75 MG tablet TAKE 1 TABLET BY MOUTH EVERY DAY 90 tablet 3     econazole nitrate 1 % external cream Apply topically daily (Patient taking differently: Apply topically daily Toes) 85 g 3     evolocumab (REPATHA) 140 MG/ML prefilled autoinjector Inject 1 mL (140 mg) Subcutaneous every 14 days 6 mL 3     ezetimibe (ZETIA) 10 MG tablet Take 1 tablet (10 mg) by mouth daily 90 tablet 3     ibuprofen (ADVIL/MOTRIN) 200 MG tablet Take 400 mg by mouth every 8 hours       levothyroxine (SYNTHROID/LEVOTHROID) 75 MCG tablet Take 1 tablet (75 mcg) by mouth daily 90 tablet 3     multivitamin, therapeutic (THERA-VIT) TABS tablet Take 1 tablet by mouth daily       nitroFURantoin macrocrystal-monohydrate (MACROBID) 100 MG capsule TAKE 1 CAPSULE BY MOUTH AFTER INTRCOURSE 30 capsule 3     rosuvastatin (CRESTOR) 20 MG tablet Take 1 tablet (20 mg) by mouth daily 90 tablet 3     solifenacin (VESICARE) 5 MG tablet Take  1 tablet (5 mg) by mouth daily 90 tablet 3     Vitamin D3 (CHOLECALCIFEROL) 25 mcg (1000 units) tablet Take 2 tablets (50 mcg) by mouth daily 60 tablet 0       Allergies   Allergen Reactions     Celexa [Citalopram Hydrobromide]      Decreased libido        Social History     Tobacco Use     Smoking status: Never Smoker     Smokeless tobacco: Never Used   Substance Use Topics     Alcohol use: No     Family History   Problem Relation Age of Onset     C.A.D. Father         MI 57     Alcohol/Drug Father         etoh     Coronary Artery Disease Father      Obesity Mother      Osteoporosis Mother      Colon Cancer Brother 70     Hyperlipidemia Son      Anxiety Disorder Son      Hyperlipidemia Son         very high, experimental drug     C.A.D. Paternal Grandmother         ascvd     Diabetes Maternal Grandmother      Cancer Maternal Grandmother      C.A.D. Paternal Uncle         Mi  age 48     Cancer Maternal Aunt         pancreatic CA     Hyperlipidemia Son      Hyperlipidemia Cousin      History   Drug Use No         Objective     LMP 2005     Physical Exam    GENERAL APPEARANCE: healthy, alert and no distress     EYES: EOMI, PERRL     HENT: ear canals and TM's normal and nose and mouth without ulcers or lesions     NECK: no adenopathy, no asymmetry, masses, or scars and thyroid normal to palpation     RESP: lungs clear to auscultation - no rales, rhonchi or wheezes     CV: regular rates and rhythm, normal S1 S2, no S3 or S4 and no murmur, click or rub     ABDOMEN:  soft, nontender, no HSM or masses and bowel sounds normal     SKIN: no suspicious lesions or rashes     NEURO: Normal strength and tone, sensory exam grossly normal, mentation intact and speech normal     PSYCH: mentation appears normal. and affect normal/bright     LYMPHATICS: No cervical adenopathy    Recent Labs   Lab Test 22  0732 22  1917 22  1345 22  0859   HGB 11.3*  --  14.0  --      --  242  --    NA  --    --  139 140   POTASSIUM  --   --  3.9 4.5   CR  --  0.58 0.62 0.68        Diagnostics:  Results for orders placed or performed in visit on 07/27/22   CBC with platelets     Status: Normal   Result Value Ref Range    WBC Count 8.1 4.0 - 11.0 10e3/uL    RBC Count 4.36 3.80 - 5.20 10e6/uL    Hemoglobin 13.2 11.7 - 15.7 g/dL    Hematocrit 41.0 35.0 - 47.0 %    MCV 94 78 - 100 fL    MCH 30.3 26.5 - 33.0 pg    MCHC 32.2 31.5 - 36.5 g/dL    RDW 13.6 10.0 - 15.0 %    Platelet Count 340 150 - 450 10e3/uL        No EKG required for low risk surgery (cataract, skin procedure, breast biopsy, etc).    Revised Cardiac Risk Index (RCRI):  The patient has the following serious cardiovascular risks for perioperative complications:   - No serious cardiac risks = 0 points     RCRI Interpretation: 0 points: Class I (very low risk - 0.4% complication rate)           Signed Electronically by: Sepideh Burris PA-C  Copy of this evaluation report is provided to requesting physician.

## 2022-07-28 ENCOUNTER — TELEPHONE (OUTPATIENT)
Dept: FAMILY MEDICINE | Facility: CLINIC | Age: 72
End: 2022-07-28

## 2022-07-28 ENCOUNTER — HOSPITAL ENCOUNTER (OUTPATIENT)
Dept: MRI IMAGING | Facility: CLINIC | Age: 72
Discharge: HOME | End: 2022-07-28
Attending: PHYSICIAN ASSISTANT | Admitting: PHYSICIAN ASSISTANT
Payer: COMMERCIAL

## 2022-07-28 DIAGNOSIS — M25.552 HIP PAIN, LEFT: ICD-10-CM

## 2022-07-28 LAB — RADIOLOGIST FLAGS: NORMAL

## 2022-07-28 PROCEDURE — 73721 MRI JNT OF LWR EXTRE W/O DYE: CPT | Mod: 26 | Performed by: RADIOLOGY

## 2022-07-28 PROCEDURE — 73721 MRI JNT OF LWR EXTRE W/O DYE: CPT | Mod: LT

## 2022-07-28 NOTE — TELEPHONE ENCOUNTER
Please call patient to let her know that she has signs of subtle fractures in the left sacrum and the pelvis (these are nondisplaced). I would her to follow up with orthopedics to discuss treatment. I know she has been following with [facility]. Would she like a referral to [facility] or call her current orthopedist at [facility] to discuss/get a visit.    Impression:  1. Edema within the right sacrum extending throughout the entire  field-of-view from proximal to distal with subtle hypointense lines  traversing the sacrum. Given the osteopenia noted on radiographs,  these findings are most consistent with nondisplaced sacral  insufficiency fractures on the left. The right sacrum is not assessed.     2. In addition, there is edema and a subtle fracture line in the  inferior pubic, edema is also noted in the superior pubic ramus,  likely also representing subtle nondisplaced insufficiency fractures  as described above.  3. Significant metal artifact surrounding the total hip arthroplasty.  Small joint effusion. No gross abnormalities.  4. Mild muscle edema of the adductor musculature.

## 2022-07-28 NOTE — TELEPHONE ENCOUNTER
"Called pt and relayed provider message below. Pt states \"I don't need a referral\". Patient was given an opportunity to ask questions, verbalized understanding of plan, and is agreeable.    Loan Schaefer RN    "

## 2022-07-28 NOTE — TELEPHONE ENCOUNTER
Usha from [facility] access center calling to inform of incidental findings found today on pt's MR of pelvis bone.    Radiologist states 'consider follow up for insufficiency fractures of  left sacrum, inferior and superior pubic rami, right sacrum, etc not assessed.'    Radiologist is Dr. Ellermann (phone number [phone])    Routing to PCP to review and advise.  Loan Schaefer RN

## 2022-08-01 ASSESSMENT — ENCOUNTER SYMPTOMS
DYSURIA: 0
HEMATURIA: 0

## 2022-08-02 ENCOUNTER — HOSPITAL ENCOUNTER (OUTPATIENT)
Dept: PHYSICAL THERAPY | Facility: CLINIC | Age: 72
Discharge: HOME | End: 2022-08-02
Payer: COMMERCIAL

## 2022-08-02 DIAGNOSIS — M25.552 HIP PAIN, LEFT: ICD-10-CM

## 2022-08-02 DIAGNOSIS — M79.662 PAIN OF LEFT LOWER LEG: ICD-10-CM

## 2022-08-02 DIAGNOSIS — L90.5 SCAR CONDITION AND FIBROSIS OF SKIN: ICD-10-CM

## 2022-08-02 DIAGNOSIS — I89.0 LYMPHEDEMA OF LEFT LEG: Primary | ICD-10-CM

## 2022-08-02 PROCEDURE — 97140 MANUAL THERAPY 1/> REGIONS: CPT | Mod: GP | Performed by: PHYSICAL THERAPIST

## 2022-08-03 DIAGNOSIS — E78.5 HYPERLIPIDEMIA LDL GOAL <70: ICD-10-CM

## 2022-08-03 NOTE — TELEPHONE ENCOUNTER
Received refill request for:  Marry  Last OV was: 5/23/22 Dr. Wilder  Labs/EKG: Lipids 3/2/22  F/U scheduled: Orders for 5/2023  Pharmacy:  [facility]    [facility] Cardiology Refill Guideline reviewed.  Medication meets criteria for refill.    Dulce Bobby RN, BSN  08/03/22 at 9:15 AM

## 2022-08-09 ENCOUNTER — HOSPITAL ENCOUNTER (OUTPATIENT)
Dept: PHYSICAL THERAPY | Facility: CLINIC | Age: 72
Discharge: HOME | End: 2022-08-09
Payer: COMMERCIAL

## 2022-08-09 DIAGNOSIS — M79.662 PAIN OF LEFT LOWER LEG: ICD-10-CM

## 2022-08-09 DIAGNOSIS — L90.5 SCAR CONDITION AND FIBROSIS OF SKIN: ICD-10-CM

## 2022-08-09 DIAGNOSIS — I89.0 LYMPHEDEMA OF LEFT LEG: Primary | ICD-10-CM

## 2022-08-09 DIAGNOSIS — M25.552 HIP PAIN, LEFT: ICD-10-CM

## 2022-08-09 PROCEDURE — 97140 MANUAL THERAPY 1/> REGIONS: CPT | Mod: GP | Performed by: PHYSICAL THERAPIST

## 2022-08-12 ENCOUNTER — TELEPHONE (OUTPATIENT)
Dept: CARDIOLOGY | Facility: CLINIC | Age: 72
End: 2022-08-12

## 2022-08-12 NOTE — TELEPHONE ENCOUNTER
Central Prior Authorization Team   Phone: [phone]    PA Initiation    Medication: Repatha   Insurance Company: [employer] - Phone [phone] Fax [phone]  Pharmacy Filling the Rx: [facility] - [place], [place] - [address]  Filling Pharmacy Phone: [phone]  Filling Pharmacy Fax:    Start Date: 8/12/2022

## 2022-08-12 NOTE — TELEPHONE ENCOUNTER
Received call from pt stating she received a letter from her insurance stating her PA for Repatha expires on 8/30/22. Will route to PA team to initiate PA for this year. Current prescription already in chart.

## 2022-08-15 ENCOUNTER — TRANSFERRED RECORDS (OUTPATIENT)
Dept: HEALTH INFORMATION MANAGEMENT | Facility: CLINIC | Age: 72
End: 2022-08-15

## 2022-08-16 NOTE — TELEPHONE ENCOUNTER
Prior Authorization Approval    Authorization Effective Date: 5/14/2022  Authorization Expiration Date: 8/12/2023  Medication: Repatha   Approved Dose/Quantity:   Reference #:     Insurance Company: [employer] - Phone [phone] Fax [phone]  Expected CoPay:       CoPay Card Available:      Foundation Assistance Needed:    Which Pharmacy is filling the prescription (Not needed for infusion/clinic administered): [facility] - [place], [place] - [address]  Pharmacy Notified: Yes  Patient Notified: Yes

## 2022-08-22 ENCOUNTER — MYC MEDICAL ADVICE (OUTPATIENT)
Dept: FAMILY MEDICINE | Facility: CLINIC | Age: 72
End: 2022-08-22

## 2022-08-22 ENCOUNTER — E-VISIT (OUTPATIENT)
Dept: FAMILY MEDICINE | Facility: CLINIC | Age: 72
End: 2022-08-22
Payer: COMMERCIAL

## 2022-08-22 DIAGNOSIS — R35.0 URINARY FREQUENCY: Primary | ICD-10-CM

## 2022-08-22 PROCEDURE — 99421 OL DIG E/M SVC 5-10 MIN: CPT | Performed by: NURSE PRACTITIONER

## 2022-08-22 NOTE — PATIENT INSTRUCTIONS
Dear Angeli Jacobson,     After reviewing your responses, I would like you to come in for a urine test to make sure we treat you correctly. This urine test is to evaluate you for a possible urinary tract infection, and should be scheduled for today or tomorrow. Schedule a Lab Only appointment here.     Lab appointments are not available at most locations on the weekends. If no Lab Only appointment is available, you should be seen in any of our convenient Walk-in or Urgent Care Centers, which can be found on our website here.     You will receive instructions with your results in [facility] once they are available.     If your symptoms worsen, you develop pain in your back or stomach, develop fevers, or are not improving in 5 days, please contact your primary care provider for an appointment or visit a Walk-in or Urgent Care Center to be seen.     Thanks again for choosing us as your health care partner,     ELVA Savage CNP    Dysuria with Uncertain Cause (Adult)    The urethra is the tube that allows urine to pass out of the body. In a woman, the urethra is the opening above the vagina. In men, the urethra is the opening on the tip of the penis. Dysuria is the feeling of pain or burning in the urethra when passing urine.   Dysuria can be caused by anything that irritates or inflames the urethra. An infection or chemical irritation can cause this reaction. A bladder infection is the most common cause of dysuria in adults. A urine test can diagnose this. A bladder infection needs antibiotic treatment.   Soaps, lotions, colognes, and feminine hygiene products can cause dysuria. So can birth control jellies, creams, and foams. It will go away 1 to 3 days after using these irritants.   Sexually transmitted infections (STIs) such as chlamydia or gonorrhea can cause dysuria. Your healthcare provider may take a culture sample. Your provider may start you on antibiotic medicine before the culture test returns.    In women who have gone through menopause, dysuria can be from dryness in the lining of the urethra. This can be treated with hormones. Dysuria becomes long-term (chronic) when it lasts for weeks or months. You may need to see a specialist (urologist) to diagnose and treat chronic dysuria.   Home care  These home care tips may help:    Don't use any chemicals or products that you think may be causing your symptoms.    If you were given a prescription medicine, take as directed. Take it until it is all used up.    If a culture was taken, don't have sex until you have been told that it is negative. A negative culture means you don't have an infection. Then follow your healthcare provider's advice to treat your condition.  If a culture was done and it is positive:     Both you and your sexual partner may need to be treated. This is true even if your partner has no symptoms.    Contact your healthcare provider or go to an urgent care clinic or the public health department to be looked at and treated.    Don't have sex until both you and your partner have finished all antibiotics and your healthcare provider says you are no longer contagious.    Learn about and use safe sex practices. The safest sex is with a partner who has tested negative and only has sex with you. Condoms can prevent STIs from spreading, but they aren't a guarantee.  Follow-up care  Follow up with your healthcare provider, or as advised. If a culture was taken, you may call as directed for the results. If you have an STI, follow up with your provider or the public health department for a complete STI screening, including HIV testing. For more information, contact CDC-INFO at 050-427-2796.   When to get medical advice  Call your healthcare provider right away if any of these occur:    You aren't better after 3 days of treatment    Fever of 100.4 F (38 C) or higher, or as directed by your provider    Back or belly pain that gets worse    You  can't urinate because of pain    New discharge from the urethra, vagina, or penis    Painful sores on the penis    Rash or joint pain    Painful lumps (lymph nodes) in the groin    Testicle pain or swelling of the scrotum  [employer] last reviewed this educational content on 10/1/2019    3426-7922 [employer]. All rights reserved. This information is not intended as a substitute for professional medical care. Always follow your healthcare professional's instructions.         Sinusitis    WHAT YOU NEED TO KNOW:    Sinusitis is inflammation or infection of your sinuses. It is most often caused by a virus. Acute sinusitis may last up to 12 weeks. Chronic sinusitis lasts longer than 12 weeks. Recurrent sinusitis means you have 4 or more times in 1 year. Sinuses         DISCHARGE INSTRUCTIONS:    Return to the emergency department if:     Your eye and eyelid are red, swollen, and painful.       You cannot open your eye.       You have vision changes, such as double vision.      Your eyeball bulges out or you cannot move your eye.       You are more sleepy than normal, or you notice changes in your ability to think, move, or talk.      You have a stiff neck, a fever, or a bad headache.       You have swelling of your forehead or scalp.    Contact your healthcare provider if:     Your symptoms do not improve after 3 days.      Your symptoms do not go away after 10 days.      You have nausea and are vomiting.      Your nose is bleeding.      You have questions or concerns about your condition or care.    Medicines: Your symptoms may go away on their own. Your healthcare provider may recommend watchful waiting for up to 10 days before starting antibiotics. You may need any of the following:     Acetaminophen decreases pain and fever. It is available without a doctor's order. Ask how much to take and how often to take it. Follow directions. Read the labels of all other medicines you are using to see if they also contain acetaminophen, or ask your doctor or pharmacist. Acetaminophen can cause liver damage if not taken correctly. Do not use more than 4 grams (4,000 milligrams) total of acetaminophen in one day.       NSAIDs, such as ibuprofen, help decrease swelling, pain, and fever. This medicine is available with or without a doctor's order. NSAIDs can cause stomach bleeding or kidney problems in certain people. If you take blood thinner medicine, always ask your healthcare provider if NSAIDs are safe for you. Always read the medicine label and follow directions.      Nasal steroid sprays may help decrease inflammation in your nose and sinuses.      Decongestants help reduce swelling and drain mucus in the nose and sinuses. They may help you breathe easier.       Antihistamines help dry mucus in the nose and relieve sneezing.       Antibiotics help treat or prevent a bacterial infection.      Take your medicine as directed. Contact your healthcare provider if you think your medicine is not helping or if you have side effects. Tell him or her if you are allergic to any medicine. Keep a list of the medicines, vitamins, and herbs you take. Include the amounts, and when and why you take them. Bring the list or the pill bottles to follow-up visits. Carry your medicine list with you in case of an emergency.    Self-care:     Rinse your sinuses. Use a sinus rinse device to rinse your nasal passages with a saline (salt water) solution or distilled water. Do not use tap water. This will help thin the mucus in your nose and rinse away pollen and dirt. It will also help reduce swelling so you can breathe normally. Ask your healthcare provider how often to do this.       Breathe in steam. Heat a bowl of water until you see steam. Lean over the bowl and make a tent over your head with a large towel. Breathe deeply for about 20 minutes. Be careful not to get too close to the steam or burn yourself. Do this 3 times a day. You can also breathe deeply when you take a hot shower.       Sleep with your head elevated. Place an extra pillow under your head before you go to sleep to help your sinuses drain.       Drink liquids as directed. Ask your healthcare provider how much liquid to drink each day and which liquids are best for you. Liquids will thin the mucus in your nose and help it drain. Avoid drinks that contain alcohol or caffeine.       Do not smoke, and avoid secondhand smoke. Nicotine and other chemicals in cigarettes and cigars can make your symptoms worse. Ask your healthcare provider for information if you currently smoke and need help to quit. E-cigarettes or smokeless tobacco still contain nicotine. Talk to your healthcare provider before you use these products.     Prevent the spread of germs that cause sinusitis: Wash your hands often with soap and water. Wash your hands after you use the bathroom, change a child's diaper, or sneeze. Wash your hands before you prepare or eat food. Handwashing         Follow up with your healthcare provider as directed: You may be referred to an ear, nose, and throat specialist. Write down your questions so you remember to ask them during your visits.        © Copyright [employer] 2019 All illustrations and images included in CareNotes are the copyrighted property of [email or web address]. or [employer].

## 2022-08-23 ENCOUNTER — LAB (OUTPATIENT)
Dept: LAB | Facility: CLINIC | Age: 72
End: 2022-08-23
Payer: COMMERCIAL

## 2022-08-23 DIAGNOSIS — R35.0 URINARY FREQUENCY: ICD-10-CM

## 2022-08-23 LAB
ALBUMIN UR-MCNC: NEGATIVE MG/DL
APPEARANCE UR: CLEAR
BACTERIA #/AREA URNS HPF: ABNORMAL /HPF
BILIRUB UR QL STRIP: NEGATIVE
COLOR UR AUTO: YELLOW
GLUCOSE UR STRIP-MCNC: NEGATIVE MG/DL
HGB UR QL STRIP: ABNORMAL
KETONES UR STRIP-MCNC: NEGATIVE MG/DL
LEUKOCYTE ESTERASE UR QL STRIP: NEGATIVE
NITRATE UR QL: NEGATIVE
PH UR STRIP: 7 [PH] (ref 5–7)
RBC #/AREA URNS AUTO: ABNORMAL /HPF
SP GR UR STRIP: 1.01 (ref 1–1.03)
SQUAMOUS #/AREA URNS AUTO: ABNORMAL /LPF
UROBILINOGEN UR STRIP-ACNC: 0.2 E.U./DL
WBC #/AREA URNS AUTO: ABNORMAL /HPF

## 2022-08-23 PROCEDURE — 81001 URINALYSIS AUTO W/SCOPE: CPT

## 2022-08-31 NOTE — PLAN OF CARE
A+Ox4. VSS on RA. Right groin site CDI, some bruising. Pulses 2+ palpable. Left leg 2+ edema. Hep gtt infusing at 650 units/hr, next 10a check at 0915. LS clear. +BS. +Gas. Voiding adequately. Up SBA. Regular diet. PRN flexeril for neck stiffness.  
A/O, VSS on RA.  Pt to IR at 1630, returned at 1830. TPA d/c'd, bedrest until 1930 pm. IMC will be discontinued at 2130 per order.  Denies pain.  Groin site with tegaderm and gauze, CDI.  Neuros intact.  Pulses +2 palpable.  Leg circumference 39.5cm.  LS clear, pt doing IS independently.  BS active, passing gas, good urine output. Reported to following RN that thompson be discontinued when pt able to ambulate.  IVF and Heparin gtt infusing. Anticipate possible discharge tomorrow.  
VSS on room air. Tele NSR. A/Ox4. Neuros intact. LLE pulses dopplerable, RLE pulses palpable. TPA infusing into catheter at 0.5. Heparin into sheath at 500. NS at 100 into PIV. Pain in neck controlled with PRN flexeril. Bedrest, log rolled.  Regular diet, NPO at 0000. BS active, Flatus +. Voided 3000ml of clear urine to thompson on shift, BMP pending. LS clear. Redo angio in IR planned for AM.     
VSS on room air. Tele NSR. A/Ox4. Neuros intact. RLE pulses palpable +2, LLE +1 via doppler. Left calf circumference at 40.5cm. Catheter into right groin infusing TPA at 0.5mg/hr. Sheath infusing Heparin at 500 units/hour. Pain controlled with PCA, minimal use. Bedrest.  Regular diet, NPO at midnight. BS active, Flatus +. Voiding adequately to thompson, clear yellow urine. LS clear.      
Vital signs stable. Alert and oriented. Lung sounds clear. Bowel sounds active, flatus present. Heparin sheath and TPA cathater infusion site clean dry and intact, soft and nontender. Pain controlled with PRN tylenol and flexeril. Log roll to reposition. Tolerating NPO status since midnight. CMS/neuros intact, pulses palpable on feet bilaterally. Tele NSR.  
Vtials stable overnight. Pain controlled with PO tylenol; PCa in place, but causes nausea. R groin site. LS clear and equal. NPO since midnight. Bedrest d/t TPA. BS active and audible; passing gas; thompson with Adequate urine output.    
Yes

## 2022-09-09 ENCOUNTER — MYC MEDICAL ADVICE (OUTPATIENT)
Dept: FAMILY MEDICINE | Facility: CLINIC | Age: 72
End: 2022-09-09

## 2022-09-09 DIAGNOSIS — Z87.81 S/P ORIF (OPEN REDUCTION INTERNAL FIXATION) FRACTURE: ICD-10-CM

## 2022-09-09 DIAGNOSIS — T82.898S OCCLUSION OF LEFT FEMOROPOPLITEAL BYPASS GRAFT, SEQUELA: ICD-10-CM

## 2022-09-09 DIAGNOSIS — I89.0 LYMPHEDEMA OF LEFT LEG: ICD-10-CM

## 2022-09-09 DIAGNOSIS — M24.662 ARTHROFIBROSIS OF KNEE JOINT, LEFT: Primary | ICD-10-CM

## 2022-09-09 DIAGNOSIS — Z98.890 S/P ORIF (OPEN REDUCTION INTERNAL FIXATION) FRACTURE: ICD-10-CM

## 2022-09-09 DIAGNOSIS — M25.562 CHRONIC PAIN OF LEFT KNEE: ICD-10-CM

## 2022-09-09 DIAGNOSIS — M25.552 HIP PAIN, LEFT: ICD-10-CM

## 2022-09-09 DIAGNOSIS — G89.29 CHRONIC PAIN OF LEFT KNEE: ICD-10-CM

## 2022-09-09 DIAGNOSIS — I73.9 PAD (PERIPHERAL ARTERY DISEASE) (H): ICD-10-CM

## 2022-09-09 DIAGNOSIS — C49.9 MYXOID LIPOSARCOMA (H): ICD-10-CM

## 2022-09-13 ENCOUNTER — HOSPITAL ENCOUNTER (OUTPATIENT)
Dept: PHYSICAL THERAPY | Facility: CLINIC | Age: 72
Discharge: HOME | End: 2022-09-13
Payer: COMMERCIAL

## 2022-09-13 DIAGNOSIS — L90.5 SCAR CONDITION AND FIBROSIS OF SKIN: ICD-10-CM

## 2022-09-13 DIAGNOSIS — M79.662 PAIN OF LEFT LOWER LEG: ICD-10-CM

## 2022-09-13 DIAGNOSIS — I89.0 LYMPHEDEMA OF LEFT LEG: Primary | ICD-10-CM

## 2022-09-13 PROCEDURE — 97140 MANUAL THERAPY 1/> REGIONS: CPT | Mod: GP | Performed by: PHYSICAL THERAPIST

## 2022-09-14 ENCOUNTER — HOSPITAL ENCOUNTER (OUTPATIENT)
Dept: PHYSICAL THERAPY | Facility: CLINIC | Age: 72
Discharge: HOME | End: 2022-09-14
Payer: COMMERCIAL

## 2022-09-14 DIAGNOSIS — L90.5 SCAR CONDITION AND FIBROSIS OF SKIN: ICD-10-CM

## 2022-09-14 DIAGNOSIS — M79.662 PAIN OF LEFT LOWER LEG: ICD-10-CM

## 2022-09-14 DIAGNOSIS — I89.0 LYMPHEDEMA OF LEFT LEG: Primary | ICD-10-CM

## 2022-09-14 PROCEDURE — 97140 MANUAL THERAPY 1/> REGIONS: CPT | Mod: GP | Performed by: PHYSICAL THERAPIST

## 2022-09-15 ENCOUNTER — HOSPITAL ENCOUNTER (OUTPATIENT)
Dept: PHYSICAL THERAPY | Facility: CLINIC | Age: 72
Discharge: HOME | End: 2022-09-15
Payer: COMMERCIAL

## 2022-09-15 DIAGNOSIS — L90.5 SCAR CONDITION AND FIBROSIS OF SKIN: ICD-10-CM

## 2022-09-15 DIAGNOSIS — M79.662 PAIN OF LEFT LOWER LEG: ICD-10-CM

## 2022-09-15 DIAGNOSIS — I89.0 LYMPHEDEMA OF LEFT LEG: Primary | ICD-10-CM

## 2022-09-15 PROCEDURE — 97140 MANUAL THERAPY 1/> REGIONS: CPT | Mod: GP | Performed by: PHYSICAL THERAPIST

## 2022-09-16 ENCOUNTER — HOSPITAL ENCOUNTER (OUTPATIENT)
Dept: PHYSICAL THERAPY | Facility: CLINIC | Age: 72
Discharge: HOME | End: 2022-09-16
Payer: COMMERCIAL

## 2022-09-16 DIAGNOSIS — M79.662 PAIN OF LEFT LOWER LEG: ICD-10-CM

## 2022-09-16 DIAGNOSIS — I89.0 LYMPHEDEMA OF LEFT LEG: Primary | ICD-10-CM

## 2022-09-16 DIAGNOSIS — L90.5 SCAR CONDITION AND FIBROSIS OF SKIN: ICD-10-CM

## 2022-09-16 PROCEDURE — 97140 MANUAL THERAPY 1/> REGIONS: CPT | Mod: GP | Performed by: PHYSICAL THERAPIST

## 2022-09-20 ENCOUNTER — OFFICE VISIT (OUTPATIENT)
Dept: SURGERY | Facility: CLINIC | Age: 72
End: 2022-09-20
Attending: INTERNAL MEDICINE
Payer: COMMERCIAL

## 2022-09-20 ENCOUNTER — HOSPITAL ENCOUNTER (OUTPATIENT)
Dept: PHYSICAL THERAPY | Facility: CLINIC | Age: 72
Discharge: HOME | End: 2022-09-20
Payer: COMMERCIAL

## 2022-09-20 VITALS
DIASTOLIC BLOOD PRESSURE: 70 MMHG | OXYGEN SATURATION: 94 % | HEIGHT: 66 IN | WEIGHT: 152 LBS | HEART RATE: 77 BPM | BODY MASS INDEX: 24.43 KG/M2 | SYSTOLIC BLOOD PRESSURE: 100 MMHG | RESPIRATION RATE: 16 BRPM

## 2022-09-20 DIAGNOSIS — Z95.828 HISTORY OF ARTERIAL BYPASS OF LOWER EXTREMITY: ICD-10-CM

## 2022-09-20 DIAGNOSIS — I89.0 LYMPHEDEMA OF LEFT LEG: Primary | ICD-10-CM

## 2022-09-20 DIAGNOSIS — C49.9 MYXOID LIPOSARCOMA (H): ICD-10-CM

## 2022-09-20 DIAGNOSIS — E03.9 HYPOTHYROIDISM, UNSPECIFIED TYPE: ICD-10-CM

## 2022-09-20 DIAGNOSIS — E78.5 HYPERLIPIDEMIA LDL GOAL <70: ICD-10-CM

## 2022-09-20 DIAGNOSIS — I73.9 PAD (PERIPHERAL ARTERY DISEASE) (H): ICD-10-CM

## 2022-09-20 DIAGNOSIS — L90.5 SCAR CONDITION AND FIBROSIS OF SKIN: ICD-10-CM

## 2022-09-20 DIAGNOSIS — R93.1 AGATSTON CORONARY ARTERY CALCIUM SCORE GREATER THAN 400: ICD-10-CM

## 2022-09-20 DIAGNOSIS — M79.662 PAIN OF LEFT LOWER LEG: ICD-10-CM

## 2022-09-20 PROCEDURE — 99215 OFFICE O/P EST HI 40 MIN: CPT | Performed by: INTERNAL MEDICINE

## 2022-09-20 PROCEDURE — 97140 MANUAL THERAPY 1/> REGIONS: CPT | Mod: GP | Performed by: PHYSICAL THERAPIST

## 2022-09-20 RX ORDER — ROSUVASTATIN CALCIUM 20 MG/1
20 TABLET, COATED ORAL DAILY
Qty: 90 TABLET | Refills: 3 | Status: SHIPPED | OUTPATIENT
Start: 2022-09-20 | End: 2023-10-17

## 2022-09-20 NOTE — PATIENT INSTRUCTIONS
Continue lymphedema wrapping and elevate leg and follow edema therapist recommendations    Refilled crestor    Fasting lipids other labs and ADRIAN , Arterial duplex in 6 months then visit at [facility].    Walk as much as you can

## 2022-09-20 NOTE — PROGRESS NOTES
SUBJECTIVE:  CC:  Follow-up visit  Multiple issues   Review of recent labs  Med refills   Tolerating crestor, zetia and PCSK 9 inhibitor  Started lymphedema therapy   Underwent  left leg surgery in may 2022  History of left lower extremity arterial bypass for PAD  She broke her left hip in April 2019 while in [place] underwent ORIF followed by left total hip replacement on October 4, 2019 at [facility]       History of present illness:   Angeli Jacobson is a 72 year old very pleasant female with past medical history of left thigh/groin sarcoma underwent surgery with lymphadenectomy and radiation therapy in the year 2000 at [facility] followed by she developed peripheral arterial disease underwent  Redo left common femoral to a bony popliteal artery bypass with 6 mm PTFE graft in January 2019 and since then reasonably doing well as for as the PAD concerned visited [place] in April and she fell down broke her left hip underwent ORIF over there.   She has a long-standing history of lymphedema after sarcoma surgery and radiation therapy.  Using compression stockings etc..  She was also evaluated extensively at lymphedema clinic and currently using pump  On October 4, 2019 she underwent left total hip replacement .  Postoperatively she was having back spasms due to positional discomfort and treated with Flexeril and they are better now.  She has been taking both Plavix and aspirin.    November 2019 , 2021 , march 2022  left lower extremity arterial duplex and ADRIAN Dopplers were good  She is able to walk approximately 10 blocks daily  In march underwent fasting lipids excellent response with combination of statin , zetia and PCSK 9   Arterial duplex patent graft  Reviewed recent imaging studies, laboratory data in the epic  She started going for lymphedema therapy  Wearing compression stockings  HISTORIES:  PROBLEM LIST:   Patient Active Problem List   Diagnosis     MULTINODUL  GOITER     Hearing loss     Hyperlipidemia LDL goal <70     Osteoporosis     Impaired fasting glucose     Lymphedema of left leg     Tubular adenoma     Chronic pain of left knee     Vertigo     Myxoid liposarcoma (HCC)     PAD (peripheral artery disease) (H)     Vascular graft occlusion (H)     Femoral-popliteal bypass graft occlusion, left (H)     History of sarcoma     S/P ORIF (open reduction internal fixation) fracture     Hip pain, left     Postural urinary incontinence     Personal history of urinary tract infection     OAB (overactive bladder)     Myalgia of pelvic floor     Lesion of bladder     S/P total hip arthroplasty     Acute pain of left knee     Closed fracture of left tibia and fibula, initial encounter     Closed displaced fracture of left patella, unspecified fracture morphology, initial encounter     Other specified injury of left quadriceps muscle, fascia and tendon, initial encounter     Elevated coronary artery calcium score=7/1/21      Arthrofibrosis of knee joint, left     PAST MEDICAL HISTORY:  Past Medical History:   Diagnosis Date     * * * SBE PROPHYLAXIS * * * 1998    Amox 500mg, take 4 tabs one hour prior to procedure.Takes this because of lymphedema secondary from leg surgey     Antiplatelet or antithrombotic long-term use      Arrhythmia      Central serous retinopathy 2001    Resolved 9/2001     CHRONIC NECK PAIN 1995     Depressive disorder      Depressive disorder, not elsewhere classified 2001     Elevated coronary artery calcium score=7/1/21 08/03/2021     Elevated coronary artery calcium score=7/1/21 08/03/2021     History of blood transfusion 04/2019 12/2020    during left hip pinning, during surgery for patella     Lateral epicondylitis      MIXED HYPERLIPIDEMIA, LDL GOAL <160 1998    LDL goal < 160     Motion sickness      MYXOID LIPOSARCOMA 2000    Left thigh, S/P excision, radiation  at [facility]     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    CHRONIC LEFT THIGH LYMPHEDEMA      Nontoxic multinodular goiter 2005    needs yearly US     Osteoporosis, unspecified 2001     Other chronic pain     fx ribs left      Other lymphedema 2000    left thigh, gets regular PT for this     Overactive bladder      PAD (peripheral artery disease) (H) 04/20/2018     PONV (postoperative nausea and vomiting)      SHINGLES 2001     Sprain of lumbosacral (joint) (ligament) 1995    right     Unspecified hearing loss 1998    chronic tinnitus     Unspecified tinnitus 1998     PAST SURGICAL HISTORY:  Past Surgical History:   Procedure Laterality Date     ARTHROPLASTY HIP Left 10/4/2019    Procedure: Removal of left femoral hardware with a conversion to left total hip arthroplasty using a Biomet Annalisa femoral stem with an OsseoTi acetabular shell and a dual mobility bearing surface;  Surgeon: Spencer Celeste MD;  Location:  OR     BIOPSY  April 2000     BYPASS GRAFT FEMOROPOPLITEAL Left 1/21/2019    Procedure: LEFT FEMORAL TO ABOVE KNEE POPLITEAL  BYPASS WITH POLYTETRAFLUOROETHYLENE GRAFT;  Surgeon: Shade Owens MD;  Location:  OR     COLONOSCOPY N/A 2/5/2016    Procedure: COMBINED COLONOSCOPY, SINGLE OR MULTIPLE BIOPSY/POLYPECTOMY BY BIOPSY;  Surgeon: Varun Stanley MD, MD;  Location:  GI     COLONOSCOPY N/A 12/10/2021    Procedure: COLONOSCOPY, WITH POLYPECTOMIES  USING COLD  SNARE,   CLIP APPLIED X2;  Surgeon: Dayton Luna MD;  Location:  GI     CYSTOSCOPY       EXCISION MALIG LESION>1.25CM  5/2000    Myxoid Liposarcoma       EXPLORE GROIN Right 5/1/2018    Procedure: EXPLORE GROIN;  EMERGENCY RIGHT FEMORAL EXPLORATION WITH FEMORAL ARTERY REPAIR.    EBL: 50mL;  Surgeon: Shade Owens MD;  Location:  OR      COLP CERVIX/UPPER VAGINA  07/1997    Negative     HC DILATION/CURETTAGE DIAG/THER NON OB  02/1997    Post menopausal bleeding on HRT, negative     HIP SURGERY Left 04/13/2019     IR ANGIOGRAM THROUGH CATHETER FOLLOW UP  12/20/2018     IR ANGIOGRAM THROUGH CATHETER FOLLOW  UP  12/21/2018     IR LOWER EXTREMITY ANGIOGRAM LEFT  12/19/2018     OPEN REDUCTION INTERNAL FIXATION PATELLA Left 12/21/2020    Procedure: Left partial patella fracture excision with advancement of the quadriceps tendon;  Surgeon: Stephen Rhodes MD;  Location: [facility] OR     OPEN REDUCTION INTERNAL FIXATION RODDING INTRAMEDULLARY TIBIA Left 12/21/2020    Procedure: Open reduction intramedullary nailing of left tibia fracture;  Surgeon: Stephen Rhodes MD;  Location: [facility] OR     REMOVE HARDWARE KNEE Left 5/31/2022    Procedure: Left knee patella hardware removal;  Surgeon: Spencer Celeste MD;  Location: [facility] OR     REPAIR TENDON QUADRICEPS Left 7/28/2021    Procedure: Left knee quadricepsplasty with intraoperative patellar fracture requiring open reduction and internal fixation of the patella;  Surgeon: Spencer Celeste MD;  Location: [facility] OR     REPAIR TENDON QUADRICEPS Left 5/31/2022    Procedure: Open left knee VY quadricepsplasty with hardware removal and allograft;  Surgeon: Spencer Celeste MD;  Location: [facility] OR     VASCULAR SURGERY  04/30/2018    Left SFA stent in bypass graft     ZZC APPENDECTOMY      Appendectomy     ZZC NONSPECIFIC PROCEDURE  04/2000    Open Biopsy Left Thigh Liposarcoma     ZZHC COLONOSCOPY THRU STOMA, DIAGNOSTIC  2006    due 2010     CURRENT MEDICATIONS:  Current Outpatient Medications   Medication Sig Dispense Refill     acetaminophen (TYLENOL) 325 MG tablet Take 650 mg by mouth every 8 hours       aspirin (ASA) 81 MG chewable tablet Take 81 mg by mouth daily       calcium carbonate 600 mg-vitamin D 400 units (CALTRATE) 600-400 MG-UNIT per tablet Take 1 chew tab by mouth daily       clopidogrel (PLAVIX) 75 MG tablet TAKE 1 TABLET BY MOUTH EVERY DAY 90 tablet 3     econazole nitrate 1 % external cream Apply topically daily (Patient taking differently: Apply topically daily Toes) 85 g 3     evolocumab (REPATHA) 140 MG/ML prefilled autoinjector Inject 1 mL (140 mg)  Subcutaneous every 14 days 6 mL 3     ezetimibe (ZETIA) 10 MG tablet Take 1 tablet (10 mg) by mouth daily 90 tablet 3     ibuprofen (ADVIL/MOTRIN) 200 MG tablet Take 400 mg by mouth every 8 hours       levothyroxine (SYNTHROID/LEVOTHROID) 75 MCG tablet Take 1 tablet (75 mcg) by mouth daily 90 tablet 3     multivitamin, therapeutic (THERA-VIT) TABS tablet Take 1 tablet by mouth daily       nitroFURantoin macrocrystal-monohydrate (MACROBID) 100 MG capsule TAKE 1 CAPSULE BY MOUTH AFTER INTRCOURSE 30 capsule 3     rosuvastatin (CRESTOR) 20 MG tablet Take 1 tablet (20 mg) by mouth daily 90 tablet 3     solifenacin (VESICARE) 5 MG tablet Take 1 tablet (5 mg) by mouth daily 90 tablet 3     Vitamin D3 (CHOLECALCIFEROL) 25 mcg (1000 units) tablet Take 2 tablets (50 mcg) by mouth daily 60 tablet 0     ALLERGIES:  Allergies   Allergen Reactions     Celexa [Citalopram Hydrobromide]      Decreased libido     SOCIAL HISTORY:  Social History     Socioeconomic History     Marital status:      Spouse name: Chris     Number of children: 3     Years of education: 14     Highest education level: Associate degree: academic program   Occupational History     Occupation: at home     Employer: NONE    Tobacco Use     Smoking status: Never Smoker     Smokeless tobacco: Never Used   Vaping Use     Vaping Use: Never used   Substance and Sexual Activity     Alcohol use: No     Drug use: No     Sexual activity: Yes     Partners: Male     Birth control/protection: Post-menopausal     Comment:  had a vasectomy   Other Topics Concern     Parent/sibling w/ CABG, MI or angioplasty before 65F 55M? No   Social History Narrative     Not on file     Social Determinants of Health     Financial Resource Strain: Low Risk      Difficulty of Paying Living Expenses: Not hard at all   Food Insecurity: No Food Insecurity     Worried About Running Out of Food in the Last Year: Never true     Ran Out of Food in the Last Year: Never true    Transportation Needs: No Transportation Needs     Lack of Transportation (Medical): No     Lack of Transportation (Non-Medical): No   Physical Activity: Inactive     Days of Exercise per Week: 0 days     Minutes of Exercise per Session: 0 min   Stress: Stress Concern Present     Feeling of Stress : To some extent   Social Connections: Moderately Integrated     Frequency of Communication with Friends and Family: Never     Frequency of Social Gatherings with Friends and Family: Never     Attends Christianity Services: More than 4 times per year     Active Member of Clubs or Organizations: Yes     Attends Club or Organization Meetings: Not on file     Marital Status:    Intimate Partner Violence: Not on file   Housing Stability: Low Risk      Unable to Pay for Housing in the Last Year: No     Number of Places Lived in the Last Year: 1     Unstable Housing in the Last Year: No     FAMILY HISTORY:  Family History   Problem Relation Age of Onset     C.A.D. Father         MI 57     Alcohol/Drug Father         etoh     Coronary Artery Disease Father      Obesity Mother      Osteoporosis Mother      Colon Cancer Brother 70     Hyperlipidemia Son      Anxiety Disorder Son      Hyperlipidemia Son         very high, experimental drug     C.A.D. Paternal Grandmother         ascvd     Diabetes Maternal Grandmother      Cancer Maternal Grandmother      C.A.D. Paternal Uncle         Mi  age 48     Cancer Maternal Aunt         pancreatic [place]     Hyperlipidemia Son      Hyperlipidemia Cousin      REVIEW OF SYSTEMS:  CONSTITUTIONAL:no malaise, fatigue, or other general symptoms  EYES: no subjective changes in visual acuity, no photophobia  ENT/MOUTH: no complaints of rhinorrhea, nasal congestion, sore throat, hearing changes  RESP:no SOB  CV: no c/o exertional chest pressure or SALDANA  GI: No abdominal pain, constipation, change in bowel movements, nausea, pyrosis, BRBPR  :no polyuria or polydipsia, no dysuria, no gross  "hematuria  MUSCULOSKELATAL:no arthalgias or myalgias  INTEGUMENTARY/SKIN: no pruritis, rashes, or moles with recent change in size, shape, or pigmentation  NEURO: no gross sensory or motor symptoms, no dizziness, no confusion  ENDOCRINE: no polyuria or polydipsia, no heat or cold intolerance  HEME/ALLERGY/IMMUNE: no fevers, chills, night sweats, or unwanted weight loss  PSYCHIATRIC: no depression, anxiety, or internal stimuli  EXAM:  /70   Pulse 77   Resp 16   Ht 1.676 m (5' 6\")   Wt 68.9 kg (152 lb)   LMP 03/18/2005   SpO2 94%   BMI 24.53 kg/m    BMI: Body mass index is 24.53 kg/m .  GENERAL APPEARANCE:  Pleasant  Healthy appearing male , alert, active, no distress cooperative.  EXAM:  EYES: clear conjunctiva, no cataracts, no obvious fundoscopic abnormalities  HENT: oropharynx, nares, and TMs are WNL  NECK: no JVD, thyromegaly or lymphadenopathy, no cervical bruits  RESP: clear to auscultation without rales, wheezes, or rhonchi  CV: RRR, no murmurs, gallops, or rubs  LYMPH: no cervical , axillary, or inguinal lymphadenopathy appreciated  GI: NABS, ND/NT, no masses or organomegally appreciated  MS: Left lower extremity lymphedema slightly improved compared to before per report , wrapping in place did not open   Palpable peripheral pulses bilaterally symmetrical  SKIN: no nevi clinically suspicious for malignancy are noted  NEURO: CN II-XII intact, no localizing sensory or motor abnoramlities noted, DTRs symmetrical bilaterally  PSYCH: Mental status exam reveals the pt to have normal mood and affect. There is no disorder of thought form or content. There is no response to internal stimuli. There is no suicidal or homicidal ideation.    US ADRIAN DOPPLER WITH EXERCISE BILATERAL  9/14/2020 2:12 PM     CLINICAL HISTORY: Status post redo of a left femoral to AK popliteal  artery PTFE bypass graft. Hx PAD; PAD (peripheral artery disease) (H)     COMPARISON: 12/12/2018.     ADRIAN FINDINGS: "      RIGHT(mmHg)  Brachial: 133  Ankle (PT): 146; Index 1.10  Ankle (DP): 163; Index 1.23     LEFT (mmHg)  Brachial: 132  Ankle (PT): 130; Index 0.98  Ankle (DP): 147; Index 1.11     The patient was exercised on a treadmill at 1.5 mph at a 10% incline  for 5 minutes total. The patient was asymptomatic.     Resting and immediate postexercise ABIs are 1.23 and 1.22 on the  right.     Resting and immediate postexercise ABIs are 1.10 and 1.08 on the left.     WAVEFORMS: The dorsalis pedis and posterior tibial arteries show  normal triphasic vascular waveforms bilaterally.                                                                      IMPRESSION:  1. RIGHT LOWER EXTREMITY: No significant interval change. ADRIAN at rest  is normal with a normal response to exercise. These findings would not  be consistent with symptoms of arterial claudication.     2. LEFT LOWER EXTREMITY: No significant interval change. ADRIAN at rest  is normal with a normal response to exercise. These findings would not  be consistent with symptoms of arterial claudication.     CLAY BRUNNER MD    EXAM: DUPLEX ARTERIAL ULTRASOUND OF THE LEFT LOWER EXTREMITY     INDICATION: Peripheral arterial disease. Revision left femoral to  above-knee synthetic arterial bypass on 1/21/2019.     COMPARISON: None.     TECHNIQUE: Duplex imaging is performed utilizing gray-scale,  two-dimensional images, and color-flow imaging. Doppler waveform  analysis and spectral Doppler imaging is also performed.     DUPLEX ARTERIAL ULTRASOUND FINDINGS:      VELOCITIES (CENTIMETERS PER SECOND)  Inflow: 164  Proximal graft anastomosis: 162  Proximal graft: 86  Mid graft: 75  Distal graft: 82  Distal anastomosis: 60  Native postanastomotic artery: 76                                                                IMPRESSION:  No significant interval change. The left femoral to popliteal arterial  bypass is widely patent with brisk multiphasic vascular waveforms  throughout and no  evidence for hemodynamic significant lesion. The  diameter of the graft measures 5.6-6.2 mm.    Reviewed recent labs with patient and her      [facility]  DATE: 3/28/2022     EXAM: RESTING AND POSTEXERCISE ANKLE BRACHIAL INDICES (ABIs)     INDICATION: Peripheral arterial disease, history of femoropopliteal  bypass graft, professional disease     COMPARISON: 9/14/2020     ADRIAN FINDINGS:      Pressure measurements in mmHg     RIGHT     Brachial: 138  Ankle (PT): 149, 1.06  Ankle (DP): 169, 1.21  Digit: 100, 0.71     LEFT     Brachial: 140  Ankle (PT): 143, 1.02  Ankle (DP): 149, 1.06  Digit: 104, 0.74     The patient was exercised on a treadmill at 1.5 mph at a 0% incline  for 5 minutes total. No symptoms during exercise. Unable to walk and  inclined due to restricted motion of the left knee.     Resting and immediate postexercise ABIs are 1.21 and 1.23 on the  right.     Resting and immediate postexercise ABIs are 1.06 and 1.11 on the left.     WAVEFORMS: Normal waveforms bilaterally.                                                                      IMPRESSION:  1. RIGHT LOWER EXTREMITY: ADRIAN at rest is normal with a normal response  to exercise. TBI is also within normal limits.     2. LEFT LOWER EXTREMITY: ADRIAN at rest is normal with a normal response  to exercise. TBI is also within normal limits.     SANDIP SANTOS MD         [facility]  DATE: 3/28/2022     EXAM: LEFT LOWER EXTREMITY ARTERIAL DUPLEX     INDICATION: Peripheral marginal disease, left femoropopliteal bypass  graft      TECHNIQUE: Duplex imaging was performed utilizing gray-scale,  compression, augmentation as appropriate, color-flow, Doppler waveform  analysis, and spectral Doppler imaging.     COMPARISON: 9/14/2020.     FINDINGS:      The left femoropopliteal bypass graft appears widely patent with  normal waveforms throughout.                                                                      IMPRESSION:   1.  Widely patent  left femoropopliteal bypass graft.     SANDIP SANTOS MD     A/P:  1. Lymphedema of left leg  She was seen and evaluated recently lymphedema therapist continue the same plan elevate the leg,  lose weight use compression stockings and pump therapy     2. History of arterial bypass of Left lower extremity  Currently taking dual antiplatelet agent baby aspirin with the Plavix tolerating without any problems and she is tolerating crestor   40 mg daily along with Zetia 10 mg daily and PCSK 9 inhibitor   Excellent lipids  Therapeutic lifestyle modification suggested    She is able to walk 10 blocks continue to walk as much as she can and follow the diet and exercise plan  Arrange ADRIAN with exercise and Arterail duplex in 6 months then visit       3. 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  Planning for leg surgery in May 2022 , may need to hold plavix 5 days before and continue asa      4. Hyperlipidemia LDL goal <70  Well controlled with statin, PCSK 9 and zetia etc  Repeat FLP, CMP , TSH in 6 months   5. Hypothyroidism, unspecified type  Clinically euthyroid and recent thyroid function tests are normal continue the same and take medication as advised    Return to clinic in 6 months [place] office or virtual visit after ADRIAN TBI and arterail duplex and fasting labs..      Copy of this note  to primary care provider.  AVS with written instructions given      Tiesha Prince MD, JASON, ALANNA, FNLA  Vascular Medicine  Clinical Lipidologist

## 2022-09-21 ENCOUNTER — ALLIED HEALTH/NURSE VISIT (OUTPATIENT)
Dept: UROLOGY | Facility: CLINIC | Age: 72
End: 2022-09-21
Payer: COMMERCIAL

## 2022-09-21 ENCOUNTER — HOSPITAL ENCOUNTER (OUTPATIENT)
Dept: PHYSICAL THERAPY | Facility: CLINIC | Age: 72
Discharge: HOME | End: 2022-09-21
Payer: COMMERCIAL

## 2022-09-21 DIAGNOSIS — I89.0 LYMPHEDEMA: ICD-10-CM

## 2022-09-21 DIAGNOSIS — M79.662 PAIN OF LEFT LOWER LEG: ICD-10-CM

## 2022-09-21 DIAGNOSIS — L90.5 SCAR CONDITION AND FIBROSIS OF SKIN: ICD-10-CM

## 2022-09-21 DIAGNOSIS — I89.0 LYMPHEDEMA OF LEFT LEG: Primary | ICD-10-CM

## 2022-09-21 DIAGNOSIS — N32.81 OAB (OVERACTIVE BLADDER): Primary | ICD-10-CM

## 2022-09-21 LAB — RESIDUAL VOLUME (RV) (EXTERNAL): 101

## 2022-09-21 PROCEDURE — 97140 MANUAL THERAPY 1/> REGIONS: CPT | Mod: GP | Performed by: PHYSICAL THERAPIST

## 2022-09-21 PROCEDURE — 51798 US URINE CAPACITY MEASURE: CPT

## 2022-09-21 NOTE — PROGRESS NOTES
[facility]    Outpatient Physical Therapy Progress Note  Patient: Angeli Jacobson  : 1950    Beginning/End Dates of Reporting Period:   to 2022; 10 sessions to date    Therapy Diagnosis: lymphedema, fibrosis     Client Self Report: to dept with GCB intact and good tolerance    Objective Measurements:  Objective Measure: LE edema  Details: 1+ pretibial pitting  Objective Measure: volume to 70cm  Details: L=7506ml (decreased 6% but remains 9% greater than presurgery); R LE= 6369ml ; L>R=18%     Details: 90 degrees (increased 6 degrees)     Goals:  Goal Identifier Home program   Goal Description Paqtient independnet in home program to manage condition of L LE lymphedema   Target Date 10/18/22   Date Met      Progress (detail required for progress note): progressing with self GCB modification and home program     Goal Identifier volume L LE   Goal Description Decrease volume L LE by 10% nearing volume presurgery and decreasing risk of infection and improving L knee mobilitiy   Target Date 22   Date Met      Progress (detail required for progress note): progressing: L decreased 6% since inital eval. continues to demonstrate moderate L knee joint effusion with boggy edema           Plan:  Continue therapy per current plan of care.  Scheduled with fitter tomorrow for new compression stocking for L LE    Discharge:  No

## 2022-09-21 NOTE — PROGRESS NOTES
Angeli Jacobson comes into clinic today at the request of Nancy Chawla PA-C Ordering Provider for PVR.    Post void residual today was 101 mL. Pt will call clinic with other concerns otherwise follow up with Nancy in 1 year.     This service provided today was under the supervising provider of the day Nancy Chawla PA-C, who was available if needed.    Angelina Wagner, CMA

## 2022-09-22 ENCOUNTER — HOSPITAL ENCOUNTER (OUTPATIENT)
Dept: PHYSICAL THERAPY | Facility: CLINIC | Age: 72
Discharge: HOME | End: 2022-09-22
Payer: COMMERCIAL

## 2022-09-22 DIAGNOSIS — I89.0 LYMPHEDEMA OF LEFT LEG: Primary | ICD-10-CM

## 2022-09-22 DIAGNOSIS — I89.0 LYMPHEDEMA: ICD-10-CM

## 2022-09-22 DIAGNOSIS — L90.5 SCAR CONDITION AND FIBROSIS OF SKIN: ICD-10-CM

## 2022-09-22 DIAGNOSIS — M79.662 PAIN OF LEFT LOWER LEG: ICD-10-CM

## 2022-09-22 PROCEDURE — 97140 MANUAL THERAPY 1/> REGIONS: CPT | Mod: GP | Performed by: PHYSICAL THERAPIST

## 2022-09-23 ENCOUNTER — HOSPITAL ENCOUNTER (OUTPATIENT)
Dept: PHYSICAL THERAPY | Facility: CLINIC | Age: 72
Discharge: HOME | End: 2022-09-23
Payer: COMMERCIAL

## 2022-09-23 DIAGNOSIS — M79.662 PAIN OF LEFT LOWER LEG: ICD-10-CM

## 2022-09-23 DIAGNOSIS — I89.0 LYMPHEDEMA OF LEFT LEG: Primary | ICD-10-CM

## 2022-09-23 DIAGNOSIS — I89.0 LYMPHEDEMA: ICD-10-CM

## 2022-09-23 DIAGNOSIS — L90.5 SCAR CONDITION AND FIBROSIS OF SKIN: ICD-10-CM

## 2022-09-23 PROCEDURE — 97140 MANUAL THERAPY 1/> REGIONS: CPT | Mod: GP | Performed by: PHYSICAL THERAPIST

## 2022-09-27 ENCOUNTER — HOSPITAL ENCOUNTER (OUTPATIENT)
Dept: PHYSICAL THERAPY | Facility: CLINIC | Age: 72
Discharge: HOME | End: 2022-09-27
Payer: COMMERCIAL

## 2022-09-27 DIAGNOSIS — I89.0 LYMPHEDEMA: ICD-10-CM

## 2022-09-27 DIAGNOSIS — M79.662 PAIN OF LEFT LOWER LEG: ICD-10-CM

## 2022-09-27 DIAGNOSIS — L90.5 SCAR CONDITION AND FIBROSIS OF SKIN: ICD-10-CM

## 2022-09-27 DIAGNOSIS — I89.0 LYMPHEDEMA OF LEFT LEG: Primary | ICD-10-CM

## 2022-09-27 PROCEDURE — 97140 MANUAL THERAPY 1/> REGIONS: CPT | Mod: GP | Performed by: PHYSICAL THERAPIST

## 2022-09-29 ENCOUNTER — HOSPITAL ENCOUNTER (OUTPATIENT)
Dept: PHYSICAL THERAPY | Facility: CLINIC | Age: 72
Discharge: HOME | End: 2022-09-29
Payer: COMMERCIAL

## 2022-09-29 DIAGNOSIS — L90.5 SCAR CONDITION AND FIBROSIS OF SKIN: ICD-10-CM

## 2022-09-29 DIAGNOSIS — M79.662 PAIN OF LEFT LOWER LEG: ICD-10-CM

## 2022-09-29 DIAGNOSIS — I89.0 LYMPHEDEMA OF LEFT LEG: Primary | ICD-10-CM

## 2022-09-29 DIAGNOSIS — I89.0 LYMPHEDEMA: ICD-10-CM

## 2022-09-29 PROCEDURE — 97140 MANUAL THERAPY 1/> REGIONS: CPT | Mod: GP | Performed by: PHYSICAL THERAPIST

## 2022-10-04 ENCOUNTER — HOSPITAL ENCOUNTER (OUTPATIENT)
Dept: PHYSICAL THERAPY | Facility: CLINIC | Age: 72
Discharge: HOME | End: 2022-10-04
Payer: COMMERCIAL

## 2022-10-04 ENCOUNTER — ONCOLOGY VISIT (OUTPATIENT)
Dept: ONCOLOGY | Facility: CLINIC | Age: 72
End: 2022-10-04
Attending: STUDENT IN AN ORGANIZED HEALTH CARE EDUCATION/TRAINING PROGRAM
Payer: COMMERCIAL

## 2022-10-04 VITALS
OXYGEN SATURATION: 97 % | HEIGHT: 66 IN | HEART RATE: 69 BPM | TEMPERATURE: 97.8 F | SYSTOLIC BLOOD PRESSURE: 134 MMHG | RESPIRATION RATE: 16 BRPM | WEIGHT: 150.6 LBS | DIASTOLIC BLOOD PRESSURE: 83 MMHG | BODY MASS INDEX: 24.2 KG/M2

## 2022-10-04 DIAGNOSIS — Z96.652 STATUS POST TOTAL LEFT KNEE REPLACEMENT: ICD-10-CM

## 2022-10-04 DIAGNOSIS — Z96.649 STATUS POST TOTAL REPLACEMENT OF HIP, UNSPECIFIED LATERALITY: ICD-10-CM

## 2022-10-04 DIAGNOSIS — M79.662 PAIN OF LEFT LOWER LEG: ICD-10-CM

## 2022-10-04 DIAGNOSIS — I89.0 LYMPHEDEMA: ICD-10-CM

## 2022-10-04 DIAGNOSIS — C49.9 MYXOID LIPOSARCOMA (H): Primary | ICD-10-CM

## 2022-10-04 DIAGNOSIS — M25.552 HIP PAIN, LEFT: ICD-10-CM

## 2022-10-04 DIAGNOSIS — I89.0 LYMPHEDEMA OF LEFT LEG: Primary | ICD-10-CM

## 2022-10-04 DIAGNOSIS — T82.898S OCCLUSION OF LEFT FEMOROPOPLITEAL BYPASS GRAFT, SEQUELA: ICD-10-CM

## 2022-10-04 DIAGNOSIS — I89.0 LYMPHEDEMA OF LEFT LEG: ICD-10-CM

## 2022-10-04 DIAGNOSIS — L90.5 SCAR CONDITION AND FIBROSIS OF SKIN: ICD-10-CM

## 2022-10-04 PROCEDURE — 99215 OFFICE O/P EST HI 40 MIN: CPT | Mod: GC | Performed by: STUDENT IN AN ORGANIZED HEALTH CARE EDUCATION/TRAINING PROGRAM

## 2022-10-04 PROCEDURE — G0463 HOSPITAL OUTPT CLINIC VISIT: HCPCS

## 2022-10-04 PROCEDURE — 97140 MANUAL THERAPY 1/> REGIONS: CPT | Mod: GP | Performed by: PHYSICAL THERAPIST

## 2022-10-04 ASSESSMENT — PAIN SCALES - GENERAL: PAINLEVEL: NO PAIN (0)

## 2022-10-04 NOTE — PROGRESS NOTES
"Oncology Rooming Note    October 4, 2022 2:33 PM   Angeli Jacobson is a 72 year old female who presents for:    Chief Complaint   Patient presents with     Oncology Clinic Visit     Tubular adenoma     Initial Vitals: /83   Pulse 69   Temp 97.8  F (36.6  C) (Oral)   Resp 16   Ht 1.676 m (5' 6\")   Wt 68.3 kg (150 lb 9.6 oz)   LMP 03/18/2005   SpO2 97%   BMI 24.31 kg/m   Estimated body mass index is 24.31 kg/m  as calculated from the following:    Height as of this encounter: 1.676 m (5' 6\").    Weight as of this encounter: 68.3 kg (150 lb 9.6 oz). Body surface area is 1.78 meters squared.  No Pain (0) Comment: Data Unavailable   Patient's last menstrual period was 03/18/2005.  Allergies reviewed: Yes  Medications reviewed: Yes    Medications: Medication refills not needed today.  Pharmacy name entered into [employer]: [id number] [place], [place] - [address] [place] [address]    Clinical concerns: None       Rossy Keen MA              "

## 2022-10-04 NOTE — PROGRESS NOTES
[facility]   PM&R clinic note        Interval history:     Angeli Jacobson presents to clinic today for follow up reg her rehab needs.   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  Was last seen in clinic on 6/28/22.  Recommendations included:  1.           Work-up: None  2.           Therapy/equipment/braces:                  1. Restart lymphedema therapy in [place] as planned. Continue with wrapping, compression stockings. Restart using pump 3 x per week.                 2.  Start outpatient physical therapy as planned at [facility] for left lower extremity.  3.           Return to clinic with Dr. Sharma in 3 months.        Symptoms,  Patient was seen for a return visit today. Notes some improvement since surgery,  She has continued to have LLE swelling and is still using her compression socks during the day and wraps at night. She has been able to use the pump x1 per week as she has been in therapies. She notes slow improvements with reduction in lymphedema and has no pain rates it at 0/10.    She feels she is slower with things due to low stamina, and still has fatigue. Sleeps ok, but interrupted due to wrapped legs, which she feels not well rested in the morning. She also sustained x 1 fall in 7/22 and fractured her tailbone, felt the knees gave way causing her to fall but this has since improved.       Therapies/HEP,  Has been seeing lymphedema therapy outpatient. No other devices.   Per PT note (Mariana) L. Leg lymphedema since 7/2022 is down 13.4 %. lymphedema more in L > R by 12 %. She is wrapping up her last appointment this week. She will be doing HEP and lymphedema management at  home. Goal is now as she moves forward she will not be wrapping at night.    Functionally,   No changes since her last visit.    Social history is unchanged.    Medications:  Current Outpatient Medications   Medication Sig Dispense Refill     acetaminophen (TYLENOL) 325 MG tablet Take 650 mg by mouth every 8 hours       aspirin (ASA) 81 MG chewable tablet Take 81 mg by mouth daily       calcium carbonate 600 mg-vitamin D 400 units (CALTRATE) 600-400 MG-UNIT per tablet Take 1 chew tab by mouth daily       clopidogrel (PLAVIX) 75 MG tablet TAKE 1 TABLET BY MOUTH EVERY DAY 90 tablet 3     econazole nitrate 1 % external cream Apply topically daily (Patient taking differently: Apply topically daily Toes) 85 g 3     evolocumab (REPATHA) 140 MG/ML prefilled autoinjector Inject 1 mL (140 mg) Subcutaneous every 14 days 6 mL 3     ezetimibe (ZETIA) 10 MG tablet Take 1 tablet (10 mg) by mouth daily 90 tablet 3     ibuprofen (ADVIL/MOTRIN) 200 MG tablet Take 400 mg by mouth every 8 hours       levothyroxine (SYNTHROID/LEVOTHROID) 75 MCG tablet Take 1 tablet (75 mcg) by mouth daily 90 tablet 3     multivitamin, therapeutic (THERA-VIT) TABS tablet Take 1 tablet by mouth daily       nitroFURantoin macrocrystal-monohydrate (MACROBID) 100 MG capsule TAKE 1 CAPSULE BY MOUTH AFTER INTRCOURSE 30 capsule 3     rosuvastatin (CRESTOR) 20 MG tablet Take 1 tablet (20 mg) by mouth daily 90 tablet 3     solifenacin (VESICARE) 5 MG tablet Take 1 tablet (5 mg) by mouth daily 90 tablet 3     Vitamin D3 (CHOLECALCIFEROL) 25 mcg (1000 units) tablet Take 2 tablets (50 mcg) by mouth daily 60 tablet 0              Physical Exam:   [facility] 03/18/2005   Gen: NAD, pleasant and cooperative   HEENT: MMM  Cardio: regular pulse  Pulm: non-labored breathing in room air  Abd: benign  Ext: Mild lymphedema in LLE extending from thigh to foot. Scar tissue and tightness noted at anterior left thigh. Positive Stemmer sign on left.   Mildly exacerbated swelling in the  bilateral knee joint areas. No erythema, warmth noted.  Well-healed surgical incision over left knee. no surrounding erythema, tenderness or swelling around incision site.  Neuro/MSK: Limited ROM w/ hip flexion, 0 degrees in full knee extension and knee flexion to 90 degrees. ROM improved at knee joint.     Labs/Imaging:  Lab Results   Component Value Date    WBC 8.1 07/27/2022    HGB 13.2 07/27/2022    HCT 41.0 07/27/2022    MCV 94 07/27/2022     07/27/2022     Lab Results   Component Value Date     05/26/2022    POTASSIUM 3.9 05/26/2022    CHLORIDE 106 05/26/2022    CO2 29 05/26/2022     (H) 06/01/2022     Lab Results   Component Value Date    GFRESTIMATED >90 05/31/2022    GFRESTBLACK >90 05/17/2021     Lab Results   Component Value Date    AST 18 03/02/2022    ALT 25 03/02/2022    ALKPHOS 87 03/02/2022    BILITOTAL 0.3 03/02/2022    BILICONJ 0.0 06/06/2006     Lab Results   Component Value Date    INR 1.01 09/27/2019     Lab Results   Component Value Date    BUN 17 05/26/2022    CR 0.58 05/31/2022              Assessment/Plan   Angeli Jacobson presents to clinic today for follow up reg her rehab needs.   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 Was last seen in clinic on 6/28/22.    She continues to improve and make progress. Discussed with her and  on goal to continue follow ups in clinic. Agree will need further review from the orthopedic doctors on the R. Knee possible effusion and will leave the team to give further recommendations and management plan.     1. Work-up: None today  2. Therapy/equipment/braces:   - Continue with the lymphedema wraps during the day. Follow PT  recommendations for any further instructions on when to remove and keep on.  - Continue with HEP as recommended by therapies, would like to focus on those strengthening and stretching.   3. Medications: continue current medications  4. Interventions: None today  5. Referral / follow up with other providers: None today  6. Follow up: RTC in 3 months with Dr. Leeann Sharma.      Patient was seen and discussed with Dr. Leeann Sharma.    Chadd Cote MD  Resident Physician PGY-2   Physical Medicine & Rehabilitation    Physician Attestation   I, Leeann Sharma MD, saw this patient and agree with the findings and plan of care as documented in the note.      Items personally reviewed/procedural attestation: vitals, labs and imaging and agree with the interpretation documented in the note.    Leeann Sharma MD    50 minutes spent on the date of the encounter doing chart review, history and exam, documentation and further activities as noted above.

## 2022-10-04 NOTE — LETTER
10/4/2022         RE: Angeli Jacobson  [id number]  [facility] [address]        Dear Colleague,    Thank you for referring your patient, Angeli Jacobson, to the [facility] [place]. Please see a copy of my visit note below.    [facility]   PM&R clinic note        Interval history:     Angeli Jacobson presents to clinic today for follow up reg her rehab needs.   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  Was last seen in clinic on 6/28/22.  Recommendations included:  1.           Work-up: None  2.           Therapy/equipment/braces:                  1. Restart lymphedema therapy in [place] as planned. Continue with wrapping, compression stockings. Restart using pump 3 x per week.                 2.  Start outpatient physical therapy as planned at [facility] for left lower extremity.  3.           Return to clinic with Dr. Sharma in 3 months.        Symptoms,  Patient was seen for a return visit today. Notes some improvement since surgery,  She has continued to have LLE swelling and is still using her compression socks during the day and wraps at night. She has been able to use the pump x1 per week as she has been in therapies. She notes slow improvements with reduction in lymphedema and has no pain rates it at 0/10.    She feels she is slower with things due to low stamina, and still has fatigue. Sleeps ok, but interrupted due to wrapped legs, which she feels not well rested in the morning. She also sustained x 1 fall in 7/22 and fractured her tailbone, felt the knees gave way causing her to fall but this has since improved.        Therapies/HEP,  Has been seeing lymphedema therapy outpatient. No other devices.   Per PT note (Mariana) L. Leg lymphedema since 7/2022 is down 13.4 %. lymphedema more in L > R by 12 %. She is wrapping up her last appointment this week. She will be doing HEP and lymphedema management at home. Goal is now as she moves forward she will not be wrapping at night.    Functionally,   No changes since her last visit.    Social history is unchanged.    Medications:  Current Outpatient Medications   Medication Sig Dispense Refill     acetaminophen (TYLENOL) 325 MG tablet Take 650 mg by mouth every 8 hours       aspirin (ASA) 81 MG chewable tablet Take 81 mg by mouth daily       calcium carbonate 600 mg-vitamin D 400 units (CALTRATE) 600-400 MG-UNIT per tablet Take 1 chew tab by mouth daily       clopidogrel (PLAVIX) 75 MG tablet TAKE 1 TABLET BY MOUTH EVERY DAY 90 tablet 3     econazole nitrate 1 % external cream Apply topically daily (Patient taking differently: Apply topically daily Toes) 85 g 3     evolocumab (REPATHA) 140 MG/ML prefilled autoinjector Inject 1 mL (140 mg) Subcutaneous every 14 days 6 mL 3     ezetimibe (ZETIA) 10 MG tablet Take 1 tablet (10 mg) by mouth daily 90 tablet 3     ibuprofen (ADVIL/MOTRIN) 200 MG tablet Take 400 mg by mouth every 8 hours       levothyroxine (SYNTHROID/LEVOTHROID) 75 MCG tablet Take 1 tablet (75 mcg) by mouth daily 90 tablet 3     multivitamin, therapeutic (THERA-VIT) TABS tablet Take 1 tablet by mouth daily       nitroFURantoin macrocrystal-monohydrate (MACROBID) 100 MG capsule TAKE 1 CAPSULE BY MOUTH AFTER INTRCOURSE 30 capsule 3     rosuvastatin (CRESTOR) 20 MG tablet Take 1 tablet (20 mg) by mouth daily 90 tablet 3     solifenacin (VESICARE) 5 MG tablet Take 1 tablet (5 mg) by mouth daily 90 tablet 3     Vitamin D3 (CHOLECALCIFEROL) 25 mcg (1000 units) tablet Take 2 tablets (50 mcg) by mouth daily 60 tablet 0              Physical Exam:   LMP 03/18/2005   Gen: NAD, pleasant  and cooperative   HEENT: MMM  Cardio: regular pulse  Pulm: non-labored breathing in room air  Abd: benign  Ext: Mild lymphedema in LLE extending from thigh to foot. Scar tissue and tightness noted at anterior left thigh. Positive Stemmer sign on left.   Mildly exacerbated swelling in the bilateral knee joint areas. No erythema, warmth noted.  Well-healed surgical incision over left knee. no surrounding erythema, tenderness or swelling around incision site.  Neuro/MSK: Limited ROM w/ hip flexion, 0 degrees in full knee extension and knee flexion to 90 degrees. ROM improved at knee joint.     Labs/Imaging:  Lab Results   Component Value Date    WBC 8.1 07/27/2022    HGB 13.2 07/27/2022    HCT 41.0 07/27/2022    MCV 94 07/27/2022     07/27/2022     Lab Results   Component Value Date     05/26/2022    POTASSIUM 3.9 05/26/2022    CHLORIDE 106 05/26/2022    CO2 29 05/26/2022     (H) 06/01/2022     Lab Results   Component Value Date    GFRESTIMATED >90 05/31/2022    GFRESTBLACK >90 05/17/2021     Lab Results   Component Value Date    AST 18 03/02/2022    ALT 25 03/02/2022    ALKPHOS 87 03/02/2022    BILITOTAL 0.3 03/02/2022    BILICONJ 0.0 06/06/2006     Lab Results   Component Value Date    INR 1.01 09/27/2019     Lab Results   Component Value Date    BUN 17 05/26/2022    CR 0.58 05/31/2022              Assessment/Plan   Angeli Jacobson presents to clinic today for follow up reg her rehab needs.   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 Was last seen in clinic on 6/28/22.    She continues to improve and make progress. Discussed with her and  on goal to continue follow ups in clinic. Agree  "will need further review from the orthopedic doctors on the R. Knee possible effusion and will leave the team to give further recommendations and management plan.     1. Work-up: None today  2. Therapy/equipment/braces:   - Continue with the lymphedema wraps during the day. Follow PT recommendations for any further instructions on when to remove and keep on.  - Continue with HEP as recommended by therapies, would like to focus on those strengthening and stretching.   3. Medications: continue current medications  4. Interventions: None today  5. Referral / follow up with other providers: None today  6. Follow up: RTC in 3 months with Dr. Leeann Sharma.      Patient was seen and discussed with Dr. Leeann Sharma.    Chadd Cote MD  Resident Physician PGY-2   Physical Medicine & Rehabilitation      50 minutes spent on the date of the encounter doing chart review, history and exam, documentation and further activities as noted above.            Oncology Rooming Note    October 4, 2022 2:33 PM   Angeli Jacobson is a 72 year old female who presents for:    Chief Complaint   Patient presents with     Oncology Clinic Visit     Tubular adenoma     Initial Vitals: /83   Pulse 69   Temp 97.8  F (36.6  C) (Oral)   Resp 16   Ht 1.676 m (5' 6\")   Wt 68.3 kg (150 lb 9.6 oz)   LMP 03/18/2005   SpO2 97%   BMI 24.31 kg/m   Estimated body mass index is 24.31 kg/m  as calculated from the following:    Height as of this encounter: 1.676 m (5' 6\").    Weight as of this encounter: 68.3 kg (150 lb 9.6 oz). Body surface area is 1.78 meters squared.  No Pain (0) Comment: Data Unavailable   Patient's last menstrual period was 03/18/2005.  Allergies reviewed: Yes  Medications reviewed: Yes    Medications: Medication refills not needed today.  Pharmacy name entered into [employer]: [id number] [place], [place] - [address] [place] [address]    Clinical concerns: None       Rossy Keen MA                  Again, thank you for " allowing me to participate in the care of your patient.        Sincerely,        Leeann Sharma MD

## 2022-10-05 ENCOUNTER — TRANSFERRED RECORDS (OUTPATIENT)
Dept: HEALTH INFORMATION MANAGEMENT | Facility: CLINIC | Age: 72
End: 2022-10-05

## 2022-10-05 NOTE — PATIENT INSTRUCTIONS
Continue with home exercises as recommended by PT,  continue to focus on those strengthening and stretching components for the knee.  Follow up with Dr. Sharma in 3 months

## 2022-10-06 ENCOUNTER — HOSPITAL ENCOUNTER (OUTPATIENT)
Dept: PHYSICAL THERAPY | Facility: CLINIC | Age: 72
Discharge: HOME | End: 2022-10-06
Payer: COMMERCIAL

## 2022-10-06 DIAGNOSIS — I89.0 LYMPHEDEMA: ICD-10-CM

## 2022-10-06 DIAGNOSIS — M79.662 PAIN OF LEFT LOWER LEG: ICD-10-CM

## 2022-10-06 DIAGNOSIS — L90.5 SCAR CONDITION AND FIBROSIS OF SKIN: ICD-10-CM

## 2022-10-06 DIAGNOSIS — I89.0 LYMPHEDEMA OF LEFT LEG: Primary | ICD-10-CM

## 2022-10-06 PROCEDURE — 97110 THERAPEUTIC EXERCISES: CPT | Mod: GP | Performed by: PHYSICAL THERAPIST

## 2022-10-06 PROCEDURE — 97140 MANUAL THERAPY 1/> REGIONS: CPT | Mod: GP | Performed by: PHYSICAL THERAPIST

## 2022-10-06 NOTE — DISCHARGE INSTRUCTIONS
LYMPHEDEMA and EXERCISE HOME PROGRAM     1. Wear new compression thigh high stocking daily. (Class 3) launder after each use, line dry. (Assessing pitting response to assess effectiveness)    2. Perform gradient compression bandaging of left leg alternating locations of fibrotic pads in areas of fibrosis leaving on during day only with option to remove before bed or option to leave on up to 2 days if feeling and looking ok. (Assessing pitting response to assess modifications)    3. Stretching/Strengthening/ aerobic  Exercises: goal of aerobic (anything that increases heart rate for extended time) ie walking/ seated machine) = 30' 5 days/week.; stretching of L knee most days; option to alternate strengthening exercises on different days.     4. Option for limited self lymphatic massage of L LE especially around L knee and thigh clearing upwards before bed or when using compression pump.    5. Compression pump x 1 hour 3x/week.    6. Continue to follow good skin care practices and precautions.     7. Compression garments on average have a 6 month life expectancy depending on their use and wear. Replace your day and night compression garments based on signs of wear (such as excessive pilling, running, snags, or holes), loss of elasticity and compression, or improper fit (too tight or too loose).       A. If your garment is still fitting well: reorder the same type and size garment.      B. If your garment is not fitting properly (because of change in arm/leg size): call your doctor to get a referral to see your lymphedema therapist for a recheck.      C. You may need to be re-measured for a new compression garment if you have had a significant change in your weight (greater than 15-20 pounds).       D. A new onset of other medical conditions (such as CHF, infection/cellulitis) may indicate the need for new compression garment.    8. Replace your bandages every 12 months or sooner if you notice loss of elasticity or  signs of excessive wear and tear.     9. Contact your physician with any worsening of edema not controlled with current home program, signs/symptoms of infection (redness, pain, warmth, fever, increased swelling).    10. You should contact your edema therapist with any questions/concerns regarding your edema/home program. You will need to call your doctor first to get a new referral if treatment is indicated.

## 2022-10-06 NOTE — PROGRESS NOTES
[facility]    OUTPATIENT PHYSICAL THERAPY  PLAN OF TREATMENT FOR OUTPATIENT REHABILITATION AND PROGRESS NOTE           Patient's Last Name, First Name, Angeli Pond Date of Birth  1950   Provider's Name  [facility] Medical Record No.  [id number]    Onset Date  5/31/2022; exacerbation following surgery Start of Care Date  7/20/2022   Type:     _X_PT   ___OT   ___SLP Medical Diagnosis  Lymphedema, fibrosis, pain   PT Diagnosis  Lymphedema, fibrosis, pain Plan of Treatment  Frequency/Duration: 1x/month   Certification date from 10/6/2022 to 12/31/2022     Goals:  Goal Identifier Home program   Goal Description Paqtient independnet in home program to manage condition of L LE lymphedema   Target Date 11/10/22 ([place])   Date Met      Progress (detail required for progress note): progressing with self GCB modification and home program; awaiting new compression thigh high stocking     Goal Identifier volume L LE   Goal Description Decrease volume L LE by 10% nearing volume presurgery and decreasing risk of infection and improving L knee mobilitiy   Target Date 10/18/22 (goal date extended as progressing)   Date Met  10/04/22   Progress (detail required for progress note): MET; decreased 13.4% from eval           Beginning/End Dates of Progress Note Reporting Period:  9/21/2022 to 10/6/2022; 6 visits since last progress note; 16 visits total since 7/20/2022    Progress Toward Goals:   Progress this reporting period: goal 2 met progressing with goal 1    Client Self (Subjective) Report for Progress Note Reporting Period: to dept with GCB intact L LE; seen by PM & R and orthopedic surgeon.      Objective Measurements:   Objective Measure: LE edema  Details: continued boggy edema of anterior knee ; continued rubbery fibrosis of L  calf  Objective Measure: volume to 70cm  Details: widest knee circumference = 42cm; L 7115ml (decreased 13.4% from eval; L>R= 12%        Objective Measure: knee flexion  Details: 90 degrees ( decreased 4 from greatest ROM when noted decreased knee edema but overal increased 6 degrees)                        I CERTIFY THE NEED FOR THESE SERVICES FURNISHED UNDER        THIS PLAN OF TREATMENT AND WHILE UNDER MY CARE     (Physician co-signature of this document indicates review and certification of the therapy plan).                Referring Provider: Dr. Leeann Guzman, PT

## 2022-10-16 ENCOUNTER — HEALTH MAINTENANCE LETTER (OUTPATIENT)
Age: 72
End: 2022-10-16

## 2022-11-03 ENCOUNTER — MYC MEDICAL ADVICE (OUTPATIENT)
Dept: FAMILY MEDICINE | Facility: CLINIC | Age: 72
End: 2022-11-03

## 2022-11-04 ENCOUNTER — TELEPHONE (OUTPATIENT)
Dept: CARDIOLOGY | Facility: CLINIC | Age: 72
End: 2022-11-04

## 2022-11-04 NOTE — TELEPHONE ENCOUNTER
Called back to pt, advised this RN is not familiar with [facility] process but will review with our pharmacy liaison and see if they have more information and follow up with pt.

## 2022-11-04 NOTE — TELEPHONE ENCOUNTER
[facility] Call Center    Phone Message    May a detailed message be left on voicemail: yes     Reason for Call: Other: Please call patient to advise:      Patient received an email from [employer] to sign up for funds. Is the nurse team able to fill this out for the patient or would the patient need to reapply on the web site?    DX: Hyperlipidemia     Please call to advise.    Action Taken: Other: Cardiology    Travel Screening: Not Applicable

## 2022-11-07 ENCOUNTER — HOSPITAL ENCOUNTER (OUTPATIENT)
Dept: MAMMOGRAPHY | Facility: CLINIC | Age: 72
Discharge: HOME | End: 2022-11-07
Attending: PHYSICIAN ASSISTANT | Admitting: PHYSICIAN ASSISTANT
Payer: COMMERCIAL

## 2022-11-07 DIAGNOSIS — Z12.31 VISIT FOR SCREENING MAMMOGRAM: ICD-10-CM

## 2022-11-07 PROCEDURE — 77067 SCR MAMMO BI INCL CAD: CPT

## 2022-11-07 NOTE — TELEPHONE ENCOUNTER
Enrolled patient in a willie through the [employer]:        Thank you,    Britney Leger CPh-T  Specialty Pharmacy Clinic Liaison - CardiologyNeurologyMultiple Sclerosis  [facility] [address] [address]  Ph: [phone] Fax: [phone]  [email or web address]

## 2022-11-10 ENCOUNTER — HOSPITAL ENCOUNTER (OUTPATIENT)
Dept: PHYSICAL THERAPY | Facility: CLINIC | Age: 72
Discharge: HOME | End: 2022-11-10
Payer: COMMERCIAL

## 2022-11-10 DIAGNOSIS — I89.0 LYMPHEDEMA OF LEFT LEG: Primary | ICD-10-CM

## 2022-11-10 DIAGNOSIS — L90.5 SCAR CONDITION AND FIBROSIS OF SKIN: ICD-10-CM

## 2022-11-10 DIAGNOSIS — I89.0 LYMPHEDEMA: ICD-10-CM

## 2022-11-10 DIAGNOSIS — M79.662 PAIN OF LEFT LOWER LEG: ICD-10-CM

## 2022-11-10 PROCEDURE — 97110 THERAPEUTIC EXERCISES: CPT | Mod: GP | Performed by: PHYSICAL THERAPIST

## 2022-11-10 PROCEDURE — 97140 MANUAL THERAPY 1/> REGIONS: CPT | Mod: GP | Performed by: PHYSICAL THERAPIST

## 2022-11-10 NOTE — PROGRESS NOTES
[facility]    Outpatient Physical Therapy Discharge Note  Patient: Angeli Jacobson  : 1950    Beginning/End Dates of Reporting Period:  2022 to 11/10/2022; 7 sessions since last PN and 17 sessions total since 2022    Referring Provider: Dr. Leeann Sharma    Therapy Diagnosis: lymphedema, fibrosis     Client Self Report: to dept in L LE GCB; returned from 3 week trip to [facility] noting worsneing edena with heat and tendency to limit ex; finds pitting assessment helpful to objectively assess status    Objective Measurements:  Objective Measure: LE edema  Details: continued boggy edema of anterior knee ; continued rubbery fibrosis of L calf; 1+ pretibial pitting  Objective Measure: volume to 70cm  Details: L = 7169ml (increased only .8% from lowest volume and decreased 12.5% from initial eval); L>R = 12.6%  Objective Measure: skin  Details: mild localized errythema L anteiror lower leg  Objective Measure: knee flexion  Details: 90 degrees (decreased 4 degrees)     Stable volume L LE and good softening of fibrosis      Goals:  Goal Identifier Home program   Goal Description Paqtient independnet in home program to manage condition of L LE lymphedema   Target Date 11/10/22 (extended [place] of state)   Date Met  11/10/22   Progress (detail required for progress note): MET     Goal Identifier volume L LE   Goal Description Decrease volume L LE by 10% nearing volume presurgery and decreasing risk of infection and improving L knee mobilitiy   Target Date 10/18/22 (goal date extended as progressing)   Date Met  10/04/22   Progress (detail required for progress note): MET; decreased 13.4% from eval       Plan:  Discharge from therapy.    Discharge:    Reason for Discharge: Patient has met all goals.      Discharge Plan: Patient to continue home program.

## 2022-11-15 ENCOUNTER — HOSPITAL ENCOUNTER (OUTPATIENT)
Dept: MAMMOGRAPHY | Facility: CLINIC | Age: 72
Discharge: HOME | End: 2022-11-15
Attending: PHYSICIAN ASSISTANT
Payer: COMMERCIAL

## 2022-11-15 ENCOUNTER — HOSPITAL ENCOUNTER (OUTPATIENT)
Dept: ULTRASOUND IMAGING | Facility: CLINIC | Age: 72
Discharge: HOME | End: 2022-11-15
Attending: PHYSICIAN ASSISTANT
Payer: COMMERCIAL

## 2022-11-15 DIAGNOSIS — R92.8 ABNORMAL MAMMOGRAM: ICD-10-CM

## 2022-11-15 PROCEDURE — 76642 ULTRASOUND BREAST LIMITED: CPT | Mod: RT

## 2022-11-15 PROCEDURE — 77061 BREAST TOMOSYNTHESIS UNI: CPT | Mod: RT

## 2022-11-16 ENCOUNTER — HOSPITAL ENCOUNTER (OUTPATIENT)
Dept: MAMMOGRAPHY | Facility: CLINIC | Age: 72
Discharge: HOME | End: 2022-11-16
Attending: PHYSICIAN ASSISTANT
Payer: COMMERCIAL

## 2022-11-16 DIAGNOSIS — R92.8 ABNORMAL MAMMOGRAM: ICD-10-CM

## 2022-11-16 DIAGNOSIS — Z17.0 MALIGNANT NEOPLASM OF UPPER-OUTER QUADRANT OF RIGHT BREAST IN FEMALE, ESTROGEN RECEPTOR POSITIVE (H): ICD-10-CM

## 2022-11-16 DIAGNOSIS — C50.411 MALIGNANT NEOPLASM OF UPPER-OUTER QUADRANT OF RIGHT BREAST IN FEMALE, ESTROGEN RECEPTOR POSITIVE (H): ICD-10-CM

## 2022-11-16 DIAGNOSIS — E04.2 NONTOXIC MULTINODULAR GOITER: ICD-10-CM

## 2022-11-16 DIAGNOSIS — Z01.818 PREOP GENERAL PHYSICAL EXAM: ICD-10-CM

## 2022-11-16 PROCEDURE — 88305 TISSUE EXAM BY PATHOLOGIST: CPT | Mod: TC | Performed by: PHYSICIAN ASSISTANT

## 2022-11-16 PROCEDURE — 88377 M/PHMTRC ALYS ISHQUANT/SEMIQ: CPT | Performed by: PHYSICIAN ASSISTANT

## 2022-11-16 PROCEDURE — 19081 BX BREAST 1ST LESION STRTCTC: CPT | Mod: RT

## 2022-11-16 PROCEDURE — 250N000009 HC RX 250: Performed by: RADIOLOGY

## 2022-11-16 PROCEDURE — 999N000065 MA POST PROCEDURE RIGHT

## 2022-11-16 RX ADMIN — LIDOCAINE HYDROCHLORIDE 5 ML: 10 INJECTION, SOLUTION INFILTRATION; PERINEURAL at 10:13

## 2022-11-16 RX ADMIN — LIDOCAINE HYDROCHLORIDE 10 ML: 10; .005 INJECTION, SOLUTION EPIDURAL; INFILTRATION; INTRACAUDAL; PERINEURAL at 10:13

## 2022-11-16 NOTE — DISCHARGE INSTRUCTIONS
After Your Breast Biopsy  Bleeding or bruising  Slight bruising is normal. If you bleed through the bandage, put direct pressure on the breast for 10 minutes.   If the breast begins to swell, or you have a lot of bleeding after 10 minutes of pressure, call the doctor who ordered your exam. Or, go to the emergency room.   Bandages  Keep your bandage in place until tomorrow morning. Do not get it wet.   If you have small pieces of tape on the skin, leave them in place. They will fall off on their own, or you can remove them after 5 days.   Activity  You may shower the morning after the exam. No heavy activity (lifting, vacuuming) on the day of your exam. You may go back to normal activity the next day, unless you had a lot of bleeding or pain.  Discomfort  You may take Tylenol (acetaminophen) today for pain. Tomorrow, you may take an anti-inflammatory medicine (aspirin, ibuprofen, Motrin, Aleve, Advil), unless your doctor tells you not to.  Wear your bra overnight to support the breast. You may also use an ice pack: Place it over the area for 15-20 minutes several times a day.  Infection  Infection is rare. Symptoms include fever, redness, increasing pain and fluid draining from the biopsy site. If you have any of these symptoms, please call the doctor who ordered your exam.  Results  Results may take up to 5 business days. A nurse or doctor from the Breast Center will call with your results. We will also send the results to the doctor who ordered your biopsy.  If you have not heard your results in 5 days, please call the Breast Center.   Other instructions  ______________________________________________________________________________________________________________________________  Call your doctor if:   You have bleeding that lasts more than 10 minutes.  You have pain that cannot be controlled.  You have signs of infection (fever, redness, drainage or other signs).  You have not received your results within 5  days.  Please call the Breast Center if you have questions or concerns about your biopsy.  For informational purposes only. Not to replace the advice of your health care provider. Copyright   2010 [employer]. All rights reserved. Clinically reviewed by Ebony Banda RN, BSN, CN-BN. [employer] [id number] - REV 12/20.

## 2022-11-18 ENCOUNTER — PATIENT OUTREACH (OUTPATIENT)
Dept: ONCOLOGY | Facility: CLINIC | Age: 72
End: 2022-11-18

## 2022-11-18 DIAGNOSIS — C50.911 MALIGNANT NEOPLASM OF RIGHT BREAST (H): Primary | ICD-10-CM

## 2022-11-18 LAB
PATH REPORT.COMMENTS IMP SPEC: ABNORMAL
PATH REPORT.COMMENTS IMP SPEC: ABNORMAL
PATH REPORT.COMMENTS IMP SPEC: YES
PATH REPORT.FINAL DX SPEC: ABNORMAL
PATH REPORT.GROSS SPEC: ABNORMAL
PATH REPORT.MICROSCOPIC SPEC OTHER STN: ABNORMAL
PATH REPORT.MICROSCOPIC SPEC OTHER STN: ABNORMAL
PATHOLOGY SYNOPTIC REPORT: ABNORMAL
PHOTO IMAGE: ABNORMAL

## 2022-11-18 PROCEDURE — 88342 IMHCHEM/IMCYTCHM 1ST ANTB: CPT | Mod: 26 | Performed by: PATHOLOGY

## 2022-11-18 PROCEDURE — 88360 TUMOR IMMUNOHISTOCHEM/MANUAL: CPT | Mod: 26 | Performed by: PATHOLOGY

## 2022-11-18 PROCEDURE — 88341 IMHCHEM/IMCYTCHM EA ADD ANTB: CPT | Mod: 26 | Performed by: PATHOLOGY

## 2022-11-18 PROCEDURE — 88305 TISSUE EXAM BY PATHOLOGIST: CPT | Mod: 26 | Performed by: PATHOLOGY

## 2022-11-18 NOTE — PROGRESS NOTES
Malignant Path:  Pathology report reviewed with our breast radiologist Dr. Ramez Alexis, who confirmed the recent breast imaging is concordant with the final surgical pathology results below.    I phoned Ms. Angeli Jacobson, confirmed her full name, date of birth, and notified patient of Ultrasound Guided Right Breast Biopsy results showing invasive ductal carcinoma.  Estrogen/ Progesterone Receptors are positive, and HER2 is still pending.  Informed patient we will call her once results are available.    Patient states no problems with biopsy site.  Recommended follow up is Medical Oncology (Referral placed) & Surgical Consultation.   Surgical Consult has been arranged with Dr. Shelly Carr on 11/22/22 @ 1:30pm at the [facility] [place].   Patient has directions and phone numbers.    Questions were answered and I explained my role as Breast Care Nurse Coordinator in assisting her with appointments, resources and social support.  New diagnosis information packet will be available for patient at surgical consult.  I will follow up with the patient. She has my phone number if she has further questions.  Patient verbalized understanding and agrees with the plan of care.  Ordering provider- Sepideh Burris PA-C has been notified of the results, recommendations for follow up, and scheduled surgical consultation.  I will forward this note, along with the pathology results.      Barbara Mayorga, RN, BSN, PHN  Breast Care Nurse Coordinator  [facility] [place]  [phone]    Angeli Jacobson [id number]  F, 1950  Surgical Pathology Report (Final result) [id number]  Authorizing Provider: Sepideh Burris PA-C Ordering Provider: Sepideh Burris PA-C  Ordering Location: [facility]  Collected: 11/16/2022 10:15 AM  Pathologist: Emelyn Alfred MD Received: 11/16/2022 11:00  AM  .  Specimens  A Breast, Right  .  .  Final Diagnosis  Right breast, 3:00, 8.0 cm from nipple, stereotactic guided needle biopsy-  -Invasive ductal carcinoma, Oscar grade 2/3(Marston score 6/9)  -Estrogen receptor is positive (>90%) and progesterone receptor is positive (60-70 %)  -Her 2 by IHC is equivocal (score 2 +)  -Her 2 by FISH is ordered and pending and will be reported separately  Electronically signed by Emelyn Alfred MD on 11/18/2022 at 2:41 PM

## 2022-11-18 NOTE — NURSING NOTE
Medical Oncology referral placed. Pt aware they will be reaching out to schedule.     Barbara Mayorga, RN, BSN, PHN  Breast Care Nurse Coordinator  [facility] [place] [place]  [phone]

## 2022-11-18 NOTE — PROGRESS NOTES
New Patient Oncology Nurse Navigator Note     Referring provider: Shelly Carr MD     Referring Clinic/Organization: [facility]     Referred to (specialty:) Medical Oncology      Date Referral Received: November 18, 2022     Evaluation for:  Breast cancer     Clinical History (per Nurse review of records provided):      Patient had a bilateral screening mammogram on 11/7/22 and a possible asymmetry was identified in the right breast, likely 12:00 retroareolar far posterior.  Right breast diagnostic imaging followed on 11/15.  Diagnostic views of the right breast with tomosynthesis redemonstrate the tiny asymmetry in the posterior right breast, approximately 8 cm from the nipple. There are no associated calcifications. No other mammographic abnormality. Further evaluation with targeted ultrasound shows a small benign cyst at the 12:00 position, 2 cm from the nipple. However, there is no definite sonographic correlate for the mammographic asymmetry.    11/16/22 -   Right breast, 3:00, 8.0 cm from nipple, stereotactic guided needle biopsy-  -Invasive ductal carcinoma, [place] grade 2/3(Oscar score 6/9)  -Estrogen receptor is positive (>90%) and progesterone receptor is positive (60-70 %)  -Her 2 by IHC is equivocal (score 2 +)  -Her 2 by FISH is ordered and pending and will be reported separately  This subsequently reported as HER2 negative by FISH.    Records Location: See Bookmarked material    Patient is scheduled for consult with Dr. Carr on 11/22 at 1:30

## 2022-11-21 NOTE — PROGRESS NOTES
Telephoned and left voice message for patient requesting call back to assist in scheduling medical oncology consult.     11/21 12:34 Barak Suh did return call and call transferred to New Patient Scheduling to finalize appointment with Dr. Angulo on 12/12 at 11:00. Leah Banuelos RN

## 2022-11-22 ENCOUNTER — OFFICE VISIT (OUTPATIENT)
Dept: SURGERY | Facility: CLINIC | Age: 72
End: 2022-11-22
Payer: COMMERCIAL

## 2022-11-22 VITALS
HEIGHT: 66 IN | DIASTOLIC BLOOD PRESSURE: 80 MMHG | WEIGHT: 147 LBS | HEART RATE: 77 BPM | BODY MASS INDEX: 23.63 KG/M2 | SYSTOLIC BLOOD PRESSURE: 120 MMHG

## 2022-11-22 DIAGNOSIS — C50.411 MALIGNANT NEOPLASM OF UPPER-OUTER QUADRANT OF RIGHT BREAST IN FEMALE, ESTROGEN RECEPTOR POSITIVE (H): Primary | ICD-10-CM

## 2022-11-22 DIAGNOSIS — Z17.0 MALIGNANT NEOPLASM OF UPPER-OUTER QUADRANT OF RIGHT BREAST IN FEMALE, ESTROGEN RECEPTOR POSITIVE (H): Primary | ICD-10-CM

## 2022-11-22 LAB — INTERPRETATION: NORMAL

## 2022-11-22 PROCEDURE — 88377 M/PHMTRC ALYS ISHQUANT/SEMIQ: CPT | Mod: 26 | Performed by: MEDICAL GENETICS

## 2022-11-22 PROCEDURE — 99205 OFFICE O/P NEW HI 60 MIN: CPT | Performed by: SURGERY

## 2022-11-22 NOTE — PROGRESS NOTES
[facility] Breast Surgery Consultation    HPI:   Angeli Jacobson is a 72 year old female who is seen in consultation at the request of Dr. Burris for evaluation of newly diagnosed right breast invasive ductal carcinoma, grade 2, ER 90%, VA 60-70%, HER 2 pending at 3:00, 8cm FN measuring 8mm in size.     She had a screening mammogram on 11/7/2022 which revealed an asymmetry in the right breast posterior depth. This was not visible on follow up diagnostic ultrasound but persisted on mammogram and stereotactic biopsy was completed.     Rossy reports no breast concerns prior to her screening mammogram.  She has a personal history of a left thigh liposarcoma and underwent excision of this in 2000.  She she developed lymph edema in the left leg and peripheral artery disease and underwent a redo left common femoral to popliteal artery bypass in January 2019 and is currently on aspirin and Plavix.    Hormonal history:  menarche 11, 3 children, 1st at age 29, post menopausal, approximately 5 years OCP use, no HRT, no fertility treatment.     Family history of breast cancer: No  Family history of ovarian cancer:  No  Family history of colon cancer: Yes -brother  Family history of prostate cancer: No      Past Medical History:   has a past medical history of * * * SBE PROPHYLAXIS * * * (1998), Antiplatelet or antithrombotic long-term use, Arrhythmia, Central serous retinopathy (2001), CHRONIC NECK PAIN (1995), Depressive disorder, Depressive disorder, not elsewhere classified (2001), Elevated coronary artery calcium score=7/1/21  (08/03/2021), Elevated coronary artery calcium score=7/1/21  (08/03/2021), History of blood transfusion (04/2019 12/2020), Lateral epicondylitis, MIXED HYPERLIPIDEMIA, LDL GOAL <160 (1998), Motion sickness, MYXOID LIPOSARCOMA (2000), 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      Current Outpatient Medications:      acetaminophen (TYLENOL) 325 MG tablet, Take 650 mg by mouth every 8 hours, Disp: , Rfl:      aspirin (ASA) 81 MG chewable tablet, Take 81 mg by mouth daily, Disp: , Rfl:      calcium carbonate 600 mg-vitamin D 400 units (CALTRATE) 600-400 MG-UNIT per tablet, Take 1 chew tab by mouth daily, Disp: , Rfl:      clopidogrel (PLAVIX) 75 MG tablet, TAKE 1 TABLET BY MOUTH EVERY DAY, Disp: 90 tablet, Rfl: 3     econazole nitrate 1 % external cream, Apply topically daily (Patient taking differently: Apply topically daily Toes), Disp: 85 g, Rfl: 3     evolocumab (REPATHA) 140 MG/ML prefilled autoinjector, Inject 1 mL (140 mg) Subcutaneous every 14 days, Disp: 6 mL, Rfl: 3     ezetimibe (ZETIA) 10 MG tablet, Take 1 tablet (10 mg) by mouth daily, Disp: 90 tablet, Rfl: 3     ibuprofen (ADVIL/MOTRIN) 200 MG tablet, Take 400 mg by mouth every 8 hours, Disp: , Rfl:      levothyroxine (SYNTHROID/LEVOTHROID) 75 MCG tablet, Take 1 tablet (75 mcg) by mouth daily, Disp: 90 tablet, Rfl: 3     multivitamin, therapeutic (THERA-VIT) TABS tablet, Take 1 tablet by mouth daily, Disp: , Rfl:      nitroFURantoin macrocrystal-monohydrate (MACROBID) 100 MG capsule, TAKE 1 CAPSULE BY MOUTH AFTER INTRCOURSE, Disp: 30 capsule, Rfl: 3     rosuvastatin (CRESTOR) 20 MG tablet, Take 1 tablet (20 mg) by mouth daily, Disp: 90 tablet, Rfl: 3     solifenacin (VESICARE) 5 MG tablet, Take 1 tablet (5 mg) by mouth daily, Disp: 90 tablet, Rfl: 3     Vitamin D3 (CHOLECALCIFEROL) 25 mcg (1000 units) tablet, Take 2 tablets (50 mcg) by mouth daily, Disp: 60 tablet, Rfl: 0    Past Surgical History:  Past Surgical History:   Procedure Laterality Date     ARTHROPLASTY HIP Left 10/4/2019     Procedure: Removal of left femoral hardware with a conversion to left total hip arthroplasty using a Biomet Annalisa femoral stem with an OsseoTi acetabular shell and a dual mobility bearing surface;  Surgeon: Spencer Celeste MD;  Location: [facility] OR     BIOPSY  April 2000     BYPASS GRAFT FEMOROPOPLITEAL Left 1/21/2019    Procedure: LEFT FEMORAL TO ABOVE KNEE POPLITEAL  BYPASS WITH POLYTETRAFLUOROETHYLENE GRAFT;  Surgeon: Shade Owens MD;  Location: [facility] OR     COLONOSCOPY N/A 2/5/2016    Procedure: COMBINED COLONOSCOPY, SINGLE OR MULTIPLE BIOPSY/POLYPECTOMY BY BIOPSY;  Surgeon: Varun Stanley MD, MD;  Location:  GI     COLONOSCOPY N/A 12/10/2021    Procedure: COLONOSCOPY, WITH POLYPECTOMIES  USING COLD  SNARE,   CLIP APPLIED X2;  Surgeon: Dayton Luna MD;  Location:  GI     CYSTOSCOPY       EXCISION MALIG LESION>1.25CM  5/2000    Myxoid Liposarcoma       EXPLORE GROIN Right 5/1/2018    Procedure: EXPLORE GROIN;  EMERGENCY RIGHT FEMORAL EXPLORATION WITH FEMORAL ARTERY REPAIR.    EBL: 50mL;  Surgeon: Shade Owens MD;  Location:  OR     HC COLP CERVIX/UPPER VAGINA  07/1997    Negative     HC DILATION/CURETTAGE DIAG/THER NON OB  02/1997    Post menopausal bleeding on HRT, negative     HIP SURGERY Left 04/13/2019     IR ANGIOGRAM THROUGH CATHETER FOLLOW UP  12/20/2018     IR ANGIOGRAM THROUGH CATHETER FOLLOW UP  12/21/2018     IR LOWER EXTREMITY ANGIOGRAM LEFT  12/19/2018     OPEN REDUCTION INTERNAL FIXATION PATELLA Left 12/21/2020    Procedure: Left partial patella fracture excision with advancement of the quadriceps tendon;  Surgeon: Stephen Rhodes MD;  Location:  OR     OPEN REDUCTION INTERNAL FIXATION RODDING INTRAMEDULLARY TIBIA Left 12/21/2020    Procedure: Open reduction intramedullary nailing of left tibia fracture;  Surgeon: Stephen Rhodes MD;  Location:  OR     REMOVE HARDWARE KNEE Left 5/31/2022    Procedure: Left knee patella hardware removal;  Surgeon: Roula  Spencer Null MD;  Location: [facility] OR     REPAIR TENDON QUADRICEPS Left 7/28/2021    Procedure: Left knee quadricepsplasty with intraoperative patellar fracture requiring open reduction and internal fixation of the patella;  Surgeon: Spencer Celeste MD;  Location: [facility] OR     REPAIR TENDON QUADRICEPS Left 5/31/2022    Procedure: Open left knee VY quadricepsplasty with hardware removal and allograft;  Surgeon: Spencer Celeste MD;  Location: [facility] OR     VASCULAR SURGERY  04/30/2018    Left SFA stent in bypass graft     ZZC APPENDECTOMY      Appendectomy     ZZC NONSPECIFIC PROCEDURE  04/2000    Open Biopsy Left Thigh Liposarcoma     ZZHC COLONOSCOPY THRU STOMA, DIAGNOSTIC  2006    due 2010           Allergies   Allergen Reactions     Celexa [Citalopram Hydrobromide]      Decreased libido        Social History:  Social History     Socioeconomic History     Marital status:      Spouse name: Chris     Number of children: 3     Years of education: 14     Highest education level: Associate degree: academic program   Occupational History     Occupation: at home     Employer: NONE    Tobacco Use     Smoking status: Never     Smokeless tobacco: Never   Vaping Use     Vaping Use: Never used   Substance and Sexual Activity     Alcohol use: No     Drug use: No     Sexual activity: Yes     Partners: Male     Birth control/protection: Post-menopausal     Comment:  had a vasectomy   Other Topics Concern     Parent/sibling w/ CABG, MI or angioplasty before 65F 55M? No   Social History Narrative     Not on file     Social Determinants of Health     Financial Resource Strain: Low Risk      Difficulty of Paying Living Expenses: Not hard at all   Food Insecurity: No Food Insecurity     Worried About Running Out of Food in the Last Year: Never true     Ran Out of Food in the Last Year: Never true   Transportation Needs: No Transportation Needs     Lack of Transportation (Medical): No     Lack of  Transportation (Non-Medical): No   Physical Activity: Inactive     Days of Exercise per Week: 0 days     Minutes of Exercise per Session: 0 min   Stress: Stress Concern Present     Feeling of Stress : To some extent   Social Connections: Moderately Integrated     Frequency of Communication with Friends and Family: Never     Frequency of Social Gatherings with Friends and Family: Never     Attends Zoroastrianism Services: More than 4 times per year     Active Member of Clubs or Organizations: Yes     Attends Club or Organization Meetings: Not on file     Marital Status:    Intimate Partner Violence: Not on file   Housing Stability: Low Risk      Unable to Pay for Housing in the Last Year: No     Number of Places Lived in the Last Year: 1     Unstable Housing in the Last Year: No        ROS:  The 10 point review of systems is negative other than noted in the HPI and above.    PE:  Vitals: LMP 03/18/2005   General appearance: well-nourished, sitting comfortably, no apparent distress  Psych: normal affect, pleasant  HEENT:  Head normocephalic and atraumatic, pupils equal and round, conjunctivae clear, mucous membranes moist, external ears and nose normal  Neck: Supple without thyromegaly or masses  Lungs: Respirations unlabored  Lymphatic: No cervical, or supraclavicular lymphadenopathy  Extremities: Without edema  Musculoskeletal:  Normal station and gait  Neurologic: nonfocal, grossly intact times four extremities, alert and oriented times three  Psychiatric: Mood and affect are appropriate  Skin: Without lesions or rashes    Breast:  A bilateral breast exam was performed in the supine position.. Bilateral breasts were palpated in a circumferential clockwise fashion including the supraclavicular and axillary areas.   Breasts are symmetric.  There is skin ecchymosis present on the upper slightly inner right breast.  Nipples are everted bilaterally.  There are no palpable masses in either breast.  Tissue is  heterogeneously dense.    Lymph:       No supraclavicular/infraclavicular adenopathy.   Axillary adenopathy: none    Assessment:  right breast invasive ductal carcinoma, grade 2, ER 90%, KS 60-70%, HER 2 pending at 3:00, 8cm FN measuring 8mm in size.       Plan:   Angeli Jacobson is a 72 year old female has newly diagnosed right breast cancer.  I reviewed the imaging and pathology reports with her and her  and explained the findings.  We talked about the fact that this is invasive ductal carcinoma  that is small in size and was found on screening mammogram.  We discussed the receptor status. We discussed that breast cancer is treated in a multidisciplinary fashion and she will also meet with oncology as well as radiation oncology pending surgical decision making and final pathology results. She is meeting with Dr. Angulo next week.    We next discussed the surgical options for treatment.  I described the procedures for lumpectomy with sentinel lymph node biopsy and mastectomy with sentinel lymph node biopsy, possible axillary node dissection including the details of the procedures, the risks, anesthesia and expected recovery.      I reviewed the data regarding lumpectomy and radiation vs mastectomy that shows that the local recurrence risk is slightly higher for lumpectomy and radiation vs mastectomy (3-5% vs. 1-2%), but the survival at 20 years is the same.  I also explained that since this is a small tumor and was not palpable on clinical breast exam prior to the biopsy, I would ask radiology to place a radiofrequency ID tag pre-operatively for localization. We discussed the role of oncoplastic lumpectomy. We discussed the risk of margin positivity following partial mastectomy surgery and need for possible return to the operating room for additional procedures if margins are positive.     I advised that lymph node biopsy is recommended whenever we are dealing with invasive breast cancer and described  the procedure for sentinel lymph node biopsy.  We discussed that in women over 70 it is reasonable to consider omission of SLNB.  She is otherwise very healthy and we discussed the utilization of the information obtained from sentinel lymph node biopsy and how that occurred can affect care going forward.  She is interested in doing a sentinel lymph node biopsy at the time of her surgery.. We talked about the risk for lymphedema which is small with removal of only a few nodes, but certainly not zero.      We talked about post-lumpectomy radiation, the course and usual side effects. We discussed that with lumpectomy, radiation is typically recommended to decrease risk of recurrence. It may be necessary following mastectomy depending on final pathology and if karla involvement is present. We discussed possibility of omitting radiation as well given age >70 and a small mass.     We discussed the role of oncotype for hormone positive, HER 2 negative cancers with negative sentinel nodes or 1-3 positive nodes (pending HER2 final result of course.)    Plan:   Tag localized right breast lumpectomy with right sentinel lymph node biopsy  Ideally I would have her hold Plavix for 1 week prior to surgery however I will touch base with Dr. Prince to ensure that this is an acceptable plan.    60 minutes total time spent on the date of this encounter doing: chart review, review of test results, patient visit, physical exam, education, counseling, developing plan of care, and documenting.    Shelly Carr MD      Please route or send letter to:  Primary Care Provider (PCP) and Referring Provider

## 2022-11-22 NOTE — NURSING NOTE
Breast Nurse Care Coordination:  I introduced self to patient, and her - Chris, and explained my role of breast nurse coordinator. I accompanied Angeli to her surgical consultation on 11/22/22 with Dr. Shelly Carr at the [facility].      Angeli was given the new breast cancer patient packet which includes educational material and support resources such as [employer], American Cancer Society, [facility] [employer], Fighting Cancer through Diet and Lifestyle, [employer], [employer], and the [facility] Breast Cancer Support Group.  I also enclosed a copy of her Right breast biopsy pathology report(11/16/2022).     Informed patient we will call her once the final Her2 result is available.      At the end of the consultation, we reviewed Angeli's plan of care and education.  The plan is for patient to have a RFID Tag Localized Right Breast Lumpectomy with right sentinel lymph node biopsy on 12/14/22 with Dr. Shelly Carr at the [facility].    Informed patient she will need to have a pre-op exam with her primary provider within 30 days of her surgery.  She will also need to take a home rapid covid test, take a picture of the results to show the pre-operative nurses the day of her surgery.      Dr. Carr is recommending for patient to hold her plavix for 5 days before surgery if her primary provider agrees with this plan.    I gave patient Jessica educational materials regarding Exercises After Breast Surgery, Central Lymph Node Biopsy, and Lumpectomy.  Informed patient for lumpectomy surgery, she will want to have two good supportive sports bras that open in the front to wear the 2 weeks following her surgery. I gave patient information on different options to purchase sports bras.     I answered her questions and encouraged patient to call me back with any future questions or concerns.  Angeli has my contact  information, and knows to contact me in the future with any questions or concerns.     Jen Capps, RN, BSN  Breast Care Nurse Coordinator  [facility] [place] [facility] [place]  [phone]

## 2022-11-22 NOTE — NURSING NOTE
Breast Patients    BREAST PATIENTS (ALL)    1-Do you have any of the following symptoms? Lump(s) or Mass(es)  2-In which breast are you having the symptoms? right  3-Have you had a Mammogram? Other Location:  [facility] [place]    -  Date:  11/7/22  4-Have you ever had a breast cyst drained? No  5-Have you ever had a breast biopsy? Yes:  Right  -  Date:  11/16/22  6-Have you ever had a Breast Cancer? No   7-Is there a history of Breast Cancer in your family? No  8-Have you ever had Ovarian Cancer? No  9-Is there a history of Ovarian Cancer in your family? No  10-Summarize your caffeine intake (i.e. coffee, tea, chocolate, soda etc.): Coffee 1 cup per day     BREAST PATIENTS (FEMALE)    11-What age did your periods begin? 11  12-Date your last menstrual period began? ?  13-Number of full-term pregnancies: 3  14-Your age when your first child was born? 29  15-Did you nurse your children? Yes  16-Are you pregnant now? No  17-Have you begun menopause? Yes  Age Menopause began:  ?  18-Have you had either ovary removed?No  19-Do you have breast implants? No   20-Do you use hormone replacement therapy?  No  21-Have you taken oral contraceptive pills?  Yes, For how many years?  Took 40 years ago   22-Have you had an intrauterine device (IUD) placed?  No  23-What is your current bra size?  36B    Coty El MA

## 2022-11-22 NOTE — LETTER
November 23, 2022          Sepideh Burris PA-C  [address] [place] [address]      RE:   Angeli Jacobson 1950      Dear Colleague,    Thank you for referring your patient, Angeli Jacobson, to [facility]. Please see a copy of my visit note below.    Angeli Jacobson is a 72 year old female who is seen in consultation at the request of Dr. Burris for evaluation of newly diagnosed right breast invasive ductal carcinoma, grade 2, ER 90%, SC 60-70%, HER 2 pending at 3:00, 8cm FN measuring 8mm in size.      She had a screening mammogram on 11/7/2022 which revealed an asymmetry in the right breast posterior depth. This was not visible on follow up diagnostic ultrasound but persisted on mammogram and stereotactic biopsy was completed.      Rossy reports no breast concerns prior to her screening mammogram.  She has a personal history of a left thigh liposarcoma and underwent excision of this in 2000.  She she developed lymph edema in the left leg and peripheral artery disease and underwent a redo left common femoral to popliteal artery bypass in January 2019 and is currently on aspirin and Plavix.     Hormonal history:  menarche 11, 3 children, 1st at age 29, post menopausal, approximately 5 years OCP use, no HRT, no fertility treatment.      Family history of breast cancer: No  Family history of ovarian cancer:  No  Family history of colon cancer: Yes -brother  Family history of prostate cancer: No        Past Medical History:   has a past medical history of * * * SBE PROPHYLAXIS * * * (1998), Antiplatelet or antithrombotic long-term use, Arrhythmia, Central serous retinopathy (2001), CHRONIC NECK PAIN (1995), Depressive disorder, Depressive disorder, not elsewhere classified (2001), Elevated coronary artery calcium score=7/1/21  (08/03/2021), Elevated coronary artery calcium score=7/1/21  (08/03/2021), History of blood transfusion (04/2019 12/2020), Lateral  epicondylitis, MIXED HYPERLIPIDEMIA, LDL GOAL <160 (1998), Motion sickness, MYXOID LIPOSARCOMA (2000), 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        Current Outpatient Medications:      acetaminophen (TYLENOL) 325 MG tablet, Take 650 mg by mouth every 8 hours, Disp: , Rfl:      aspirin (ASA) 81 MG chewable tablet, Take 81 mg by mouth daily, Disp: , Rfl:      calcium carbonate 600 mg-vitamin D 400 units (CALTRATE) 600-400 MG-UNIT per tablet, Take 1 chew tab by mouth daily, Disp: , Rfl:      clopidogrel (PLAVIX) 75 MG tablet, TAKE 1 TABLET BY MOUTH EVERY DAY, Disp: 90 tablet, Rfl: 3     econazole nitrate 1 % external cream, Apply topically daily (Patient taking differently: Apply topically daily Toes), Disp: 85 g, Rfl: 3     evolocumab (REPATHA) 140 MG/ML prefilled autoinjector, Inject 1 mL (140 mg) Subcutaneous every 14 days, Disp: 6 mL, Rfl: 3     ezetimibe (ZETIA) 10 MG tablet, Take 1 tablet (10 mg) by mouth daily, Disp: 90 tablet, Rfl: 3     ibuprofen (ADVIL/MOTRIN) 200 MG tablet, Take 400 mg by mouth every 8 hours, Disp: , Rfl:      levothyroxine (SYNTHROID/LEVOTHROID) 75 MCG tablet, Take 1 tablet (75 mcg) by mouth daily, Disp: 90 tablet, Rfl: 3     multivitamin, therapeutic (THERA-VIT) TABS tablet, Take 1 tablet by mouth daily, Disp: , Rfl:      nitroFURantoin macrocrystal-monohydrate (MACROBID) 100 MG capsule, TAKE 1 CAPSULE BY MOUTH AFTER INTRCOURSE, Disp: 30 capsule, Rfl: 3     rosuvastatin (CRESTOR) 20 MG tablet, Take 1 tablet (20 mg) by mouth daily, Disp: 90 tablet, Rfl: 3     solifenacin (VESICARE) 5 MG tablet, Take 1 tablet (5 mg) by mouth daily, Disp:  90 tablet, Rfl: 3     Vitamin D3 (CHOLECALCIFEROL) 25 mcg (1000 units) tablet, Take 2 tablets (50 mcg) by mouth daily, Disp: 60 tablet, Rfl: 0     Past Surgical History:  Past Surgical History         Past Surgical History:   Procedure Laterality Date     ARTHROPLASTY HIP Left 10/4/2019     Procedure: Removal of left femoral hardware with a conversion to left total hip arthroplasty using a Biomet Annalisa femoral stem with an OsseoTi acetabular shell and a dual mobility bearing surface;  Surgeon: Spencer Celeste MD;  Location: [facility] OR     BIOPSY   April 2000     BYPASS GRAFT FEMOROPOPLITEAL Left 1/21/2019     Procedure: LEFT FEMORAL TO ABOVE KNEE POPLITEAL  BYPASS WITH POLYTETRAFLUOROETHYLENE GRAFT;  Surgeon: Shade Owens MD;  Location:  OR     COLONOSCOPY N/A 2/5/2016     Procedure: COMBINED COLONOSCOPY, SINGLE OR MULTIPLE BIOPSY/POLYPECTOMY BY BIOPSY;  Surgeon: Varun Stanley MD, MD;  Location:  GI     COLONOSCOPY N/A 12/10/2021     Procedure: COLONOSCOPY, WITH POLYPECTOMIES  USING COLD  SNARE,   CLIP APPLIED X2;  Surgeon: Dayton Luna MD;  Location:  GI     CYSTOSCOPY         EXCISION MALIG LESION>1.25CM   5/2000     Myxoid Liposarcoma       EXPLORE GROIN Right 5/1/2018     Procedure: EXPLORE GROIN;  EMERGENCY RIGHT FEMORAL EXPLORATION WITH FEMORAL ARTERY REPAIR.     EBL: 50mL;  Surgeon: Shade Owens MD;  Location:  OR      COLP CERVIX/UPPER VAGINA   07/1997     Negative     HC DILATION/CURETTAGE DIAG/THER NON OB   02/1997     Post menopausal bleeding on HRT, negative     HIP SURGERY Left 04/13/2019     IR ANGIOGRAM THROUGH CATHETER FOLLOW UP   12/20/2018     IR ANGIOGRAM THROUGH CATHETER FOLLOW UP   12/21/2018     IR LOWER EXTREMITY ANGIOGRAM LEFT   12/19/2018     OPEN REDUCTION INTERNAL FIXATION PATELLA Left 12/21/2020     Procedure: Left partial patella fracture excision with advancement of the quadriceps tendon;  Surgeon: Stephen Rhodes MD;  Location:  OR     OPEN  REDUCTION INTERNAL FIXATION RODDING INTRAMEDULLARY TIBIA Left 12/21/2020     Procedure: Open reduction intramedullary nailing of left tibia fracture;  Surgeon: Stephen Rhodes MD;  Location: [facility] OR     REMOVE HARDWARE KNEE Left 5/31/2022     Procedure: Left knee patella hardware removal;  Surgeon: Spencer Celeste MD;  Location: [facility] OR     REPAIR TENDON QUADRICEPS Left 7/28/2021     Procedure: Left knee quadricepsplasty with intraoperative patellar fracture requiring open reduction and internal fixation of the patella;  Surgeon: Spencer Celeste MD;  Location: [facility] OR     REPAIR TENDON QUADRICEPS Left 5/31/2022     Procedure: Open left knee VY quadricepsplasty with hardware removal and allograft;  Surgeon: Spencer Celeste MD;  Location: [facility] OR     VASCULAR SURGERY   04/30/2018     Left SFA stent in bypass graft     ZZC APPENDECTOMY         Appendectomy     ZZC NONSPECIFIC PROCEDURE   04/2000     Open Biopsy Left Thigh Liposarcoma     ZZHC COLONOSCOPY THRU STOMA, DIAGNOSTIC   2006     due 2010                                 Allergies   Allergen Reactions     Celexa [Citalopram Hydrobromide]         Decreased libido                     Social History:  Social History   Social History            Socioeconomic History     Marital status:        Spouse name: Chris     Number of children: 3     Years of education: 14     Highest education level: Associate degree: academic program   Occupational History     Occupation: at home       Employer: NONE    Tobacco Use     Smoking status: Never     Smokeless tobacco: Never   Vaping Use     Vaping Use: Never used   Substance and Sexual Activity     Alcohol use: No     Drug use: No     Sexual activity: Yes       Partners: Male       Birth control/protection: Post-menopausal       Comment:  had a vasectomy   Other Topics Concern     Parent/sibling w/ CABG, MI or angioplasty before 65F 55M? No   Social History Narrative     Not on file       Social Determinants of Health          Financial Resource Strain: Low Risk      Difficulty of Paying Living Expenses: Not hard at all   Food Insecurity: No Food Insecurity     Worried About Running Out of Food in the Last Year: Never true     Ran Out of Food in the Last Year: Never true   Transportation Needs: No Transportation Needs     Lack of Transportation (Medical): No     Lack of Transportation (Non-Medical): No   Physical Activity: Inactive     Days of Exercise per Week: 0 days     Minutes of Exercise per Session: 0 min   Stress: Stress Concern Present     Feeling of Stress : To some extent   Social Connections: Moderately Integrated     Frequency of Communication with Friends and Family: Never     Frequency of Social Gatherings with Friends and Family: Never     Attends [employer] Services: More than 4 times per year     Active Member of Clubs or Organizations: Yes     Attends Club or Organization Meetings: Not on file     Marital Status:    Intimate Partner Violence: Not on file   Housing Stability: Low Risk      Unable to Pay for Housing in the Last Year: No     Number of Places Lived in the Last Year: 1     Unstable Housing in the Last Year: No                        ROS:  The 10 point review of systems is negative other than noted in the HPI and above.     PE:  Vitals: LMP 03/18/2005   General appearance: well-nourished, sitting comfortably, no apparent distress  Psych: normal affect, pleasant  HEENT:  Head normocephalic and atraumatic, pupils equal and round, conjunctivae clear, mucous membranes moist, external ears and nose normal  Neck: Supple without thyromegaly or masses  Lungs: Respirations unlabored  Lymphatic: No cervical, or supraclavicular lymphadenopathy  Extremities: Without edema  Musculoskeletal:  Normal station and gait  Neurologic: nonfocal, grossly intact times four extremities, alert and oriented times three  Psychiatric: Mood and affect are appropriate  Skin: Without lesions  or rashes     Breast:  A bilateral breast exam was performed in the supine position.. Bilateral breasts were palpated in a circumferential clockwise fashion including the supraclavicular and axillary areas.   Breasts are symmetric.  There is skin ecchymosis present on the upper slightly inner right breast.  Nipples are everted bilaterally.  There are no palpable masses in either breast.  Tissue is heterogeneously dense.     Lymph:                            No supraclavicular/infraclavicular adenopathy.               Axillary adenopathy: none     Assessment:  right breast invasive ductal carcinoma, grade 2, ER 90%, NE 60-70%, HER 2 pending at 3:00, 8cm FN measuring 8mm in size.         Plan:   Angeli Jacobson is a 72 year old female has newly diagnosed right breast cancer.  I reviewed the imaging and pathology reports with her and her  and explained the findings.  We talked about the fact that this is invasive ductal carcinoma  that is small in size and was found on screening mammogram.  We discussed the receptor status. We discussed that breast cancer is treated in a multidisciplinary fashion and she will also meet with oncology as well as radiation oncology pending surgical decision making and final pathology results. She is meeting with Dr. Angulo next week.     We next discussed the surgical options for treatment.  I described the procedures for lumpectomy with sentinel lymph node biopsy and mastectomy with sentinel lymph node biopsy, possible axillary node dissection including the details of the procedures, the risks, anesthesia and expected recovery.       I reviewed the data regarding lumpectomy and radiation vs mastectomy that shows that the local recurrence risk is slightly higher for lumpectomy and radiation vs mastectomy (3-5% vs. 1-2%), but the survival at 20 years is the same.  I also explained that since this is a small tumor and was not palpable on clinical breast exam prior to the biopsy,  I would ask radiology to place a radiofrequency ID tag pre-operatively for localization. We discussed the role of oncoplastic lumpectomy. We discussed the risk of margin positivity following partial mastectomy surgery and need for possible return to the operating room for additional procedures if margins are positive.      I advised that lymph node biopsy is recommended whenever we are dealing with invasive breast cancer and described the procedure for sentinel lymph node biopsy.  We discussed that in women over 70 it is reasonable to consider omission of SLNB.  She is otherwise very healthy and we discussed the utilization of the information obtained from sentinel lymph node biopsy and how that occurred can affect care going forward.  She is interested in doing a sentinel lymph node biopsy at the time of her surgery.. We talked about the risk for lymphedema which is small with removal of only a few nodes, but certainly not zero.       We talked about post-lumpectomy radiation, the course and usual side effects. We discussed that with lumpectomy, radiation is typically recommended to decrease risk of recurrence. It may be necessary following mastectomy depending on final pathology and if karla involvement is present. We discussed possibility of omitting radiation as well given age >70 and a small mass.      We discussed the role of oncotype for hormone positive, HER 2 negative cancers with negative sentinel nodes or 1-3 positive nodes (pending HER2 final result of course.)     Plan:   Tag localized right breast lumpectomy with right sentinel lymph node biopsy  Ideally I would have her hold Plavix for 1 week prior to surgery however I will touch base with Dr. Prince to ensure that this is an acceptable plan.    Again, thank you for allowing me to participate in the care of your patient.      Sincerely,      Shelly Carr MD

## 2022-11-23 ENCOUNTER — TELEPHONE (OUTPATIENT)
Dept: SURGERY | Facility: CLINIC | Age: 72
End: 2022-11-23

## 2022-11-23 NOTE — TELEPHONE ENCOUNTER
I called Angeli to notify her of the following results. Angeli verified her name and date of birth.        INTERPRETATION:  Group 5 (MICHAEL Negative)     Per the American Society of Clinical Oncology/College of American Pathologists Clinical Practice Guideline Focused Update (Bertha SPRING et al, 2018, Arch Pathol Lab Med  doi:10.5858/arpa.[email or web address]), the HER2/OMER 17 ratio of 1.2 and average number of HER2 signals/cell of 2.5 places this specimen in Group 5 (MICHAEL Negative).      Taken together, the FISH and IHC results are interpreted as HER2 negative      Questions answered. Pt verbalized understanding.     Barbara Mayorga, RN, BSN, PHN  Breast Care Nurse Coordinator  [facility]  [phone]

## 2022-11-26 ENCOUNTER — MYC MEDICAL ADVICE (OUTPATIENT)
Dept: FAMILY MEDICINE | Facility: CLINIC | Age: 72
End: 2022-11-26

## 2022-11-28 ENCOUNTER — TELEPHONE (OUTPATIENT)
Dept: OTHER | Facility: CLINIC | Age: 72
End: 2022-11-28

## 2022-11-28 NOTE — TELEPHONE ENCOUNTER
CJ - pt is scheduled for a pre-op with you on 12/8/22.  Pt has a question regarding taking Plavix.  Should she hold before surgery?  Please review and advise.  Katherine Ibanez, CMA

## 2022-11-28 NOTE — TELEPHONE ENCOUNTER
----- Message from Tiesha Prince MD sent at 11/28/2022  1:55 PM CST -----  Jamil Bravo,    Ok to hold plavix 5 days before surgery and restart when able post op   Double the dose of aspirin to 162 mg daily ( 2 baby aspirins daily ) during the time of holding her Plavix if that is ok with you .    Lax   ----- Message -----  From: Shelly Carr MD  Sent: 11/23/2022   8:40 AM CST  To: MD Jamil Mathews Dr. (and team),     I saw Angeli due to a newly diagnosed small right breast cancer. Plan for lumpectomy with sentinel lymph node biopsy soon. Can you review her chart and let me know if acceptable to hold plavix for 5 days prior to her surgery?     Thank you!  Shelly

## 2022-11-29 ENCOUNTER — TELEPHONE (OUTPATIENT)
Dept: SURGERY | Facility: CLINIC | Age: 72
End: 2022-11-29

## 2022-11-29 NOTE — TELEPHONE ENCOUNTER
Pt has an upcoming appt for a pre-op.  Informed her that the Plavix will be discussed at that visit.  Katherine Ibanez CMA

## 2022-11-29 NOTE — TELEPHONE ENCOUNTER
Type of surgery: right breast tag localized lumpectomy and right SLN biopsy  Location of surgery: [facility]  Date and time of surgery: 12/14/22 9:10am  Surgeon: Dr Carr  Pre-Op Appt Date: pt to schedule  Post-Op Appt Date: pt to schedule   Packet sent out: Yes  Pre-cert/Authorization completed:  Not Applicable  Date: 11/22/22

## 2022-11-29 NOTE — TELEPHONE ENCOUNTER
The Plavix will be addressed at a preop.  Okay to use my same day/provider approval spot on for preop for patient.  Please call.

## 2022-12-06 NOTE — H&P (VIEW-ONLY)
[facility]    Hematology/Oncology New Patient Note      Today's Date: 12/12/22    Reason for Consult:  Right breast cancer.    HISTORY OF PRESENT ILLNESS: Angeli Jacobson is a 72 year old female with history of left thigh liposarcoma status post excision in 2000 with subsequent left leg lymphedema and peripheral arterial disease status post redo left common femoral to popliteal artery bypass in January 2019, who presents with the following oncologic history:  1.  11/7/2022: Screening mammogram showed asymmetry in the right breast at 12:00, retroareolar far posterior.  Left breast negative.  2.  11/15/2022: Diagnostic right mammogram showed asymmetry in the posterior right breast, 8 cm from nipple.  Ultrasound of right breast at 12:00, 2 cm from nipple showed a small benign cyst but no definite sonographic correlate for the mammographic asymmetry.  3.  11/16/2022: Stereotactic guided right breast needle biopsy of 8 mm lesion at 3:00, 8 cm from the nipple showed grade 2 invasive ductal carcinoma, ER positive at %, NC positive at 60-70%, HER2 IHC = 2+ equivocal, HER2/compa FISH negative.    Angeli reports some tenderness at the right breast biopsy site.  Otherwise, no pain elsewhere.  She has some baseline tiredness, per her usual.    REVIEW OF SYSTEMS:   14 point ROS was reviewed and is negative other than as noted above in HPI.       HOME MEDICATIONS:  Current Outpatient Medications   Medication Sig Dispense Refill     acetaminophen (TYLENOL) 325 MG tablet Take 650 mg by mouth every 8 hours       aspirin (ASA) 81 MG chewable tablet Take 81 mg by mouth daily       calcium carbonate 600 mg-vitamin D 400 units (CALTRATE) 600-400 MG-UNIT per tablet Take 1 chew tab by mouth daily       clopidogrel (PLAVIX) 75 MG tablet TAKE 1 TABLET BY MOUTH EVERY DAY 90 tablet 3     econazole nitrate 1 % external cream Apply topically daily (Patient taking differently: Apply topically daily Toes) 85 g 3      evolocumab (REPATHA) 140 MG/ML prefilled autoinjector Inject 1 mL (140 mg) Subcutaneous every 14 days 6 mL 3     ezetimibe (ZETIA) 10 MG tablet Take 1 tablet (10 mg) by mouth daily 90 tablet 3     ibuprofen (ADVIL/MOTRIN) 200 MG tablet Take 400 mg by mouth every 8 hours       levothyroxine (SYNTHROID/LEVOTHROID) 75 MCG tablet Take 1 tablet (75 mcg) by mouth daily 90 tablet 3     multivitamin, therapeutic (THERA-VIT) TABS tablet Take 1 tablet by mouth daily       nitroFURantoin macrocrystal-monohydrate (MACROBID) 100 MG capsule TAKE 1 CAPSULE BY MOUTH AFTER INTRCOURSE 30 capsule 3     rosuvastatin (CRESTOR) 20 MG tablet Take 1 tablet (20 mg) by mouth daily 90 tablet 3     solifenacin (VESICARE) 5 MG tablet Take 1 tablet (5 mg) by mouth daily 90 tablet 3     Vitamin D3 (CHOLECALCIFEROL) 25 mcg (1000 units) tablet Take 2 tablets (50 mcg) by mouth daily 60 tablet 0         ALLERGIES:  Allergies   Allergen Reactions     Celexa [Citalopram Hydrobromide]      Decreased libido         PAST MEDICAL HISTORY:  Past Medical History:   Diagnosis Date     * * * SBE PROPHYLAXIS * * * 1998    Amox 500mg, take 4 tabs one hour prior to procedure.Takes this because of lymphedema secondary from leg surgey     Antiplatelet or antithrombotic long-term use      Arrhythmia      Central serous retinopathy 2001    Resolved 9/2001     CHRONIC NECK PAIN 1995     Depressive disorder      Depressive disorder, not elsewhere classified 2001     Elevated coronary artery calcium score=7/1/21 08/03/2021     Elevated coronary artery calcium score=7/1/21 08/03/2021     History of blood transfusion 04/2019 12/2020    during left hip pinning, during surgery for patella     Lateral epicondylitis      MIXED HYPERLIPIDEMIA, LDL GOAL <160 1998    LDL goal < 160     Motion sickness      MYXOID LIPOSARCOMA 2000    Left thigh, S/P excision, radiation  at [facility]     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    CHRONIC LEFT THIGH LYMPHEDEMA     Nontoxic  multinodular goiter 2005    needs yearly US     Osteoporosis, unspecified      Other chronic pain     fx ribs left      Other lymphedema     left thigh, gets regular PT for this     Overactive bladder      PAD (peripheral artery disease) (H) 2018     PONV (postoperative nausea and vomiting)      SHINGLES      Sprain of lumbosacral (joint) (ligament)     right     Unspecified hearing loss     chronic tinnitus     Unspecified tinnitus      Gynecologic history:  Age of menarche at 11, , age of first pregnancy at 29, 5 years of OCP use, no HRT or fertility treatment.    PAST SURGICAL HISTORY:  Past Surgical History:   Procedure Laterality Date     ARTHROPLASTY HIP Left 10/4/2019    Procedure: Removal of left femoral hardware with a conversion to left total hip arthroplasty using a Biomet Annalisa femoral stem with an OsseoTi acetabular shell and a dual mobility bearing surface;  Surgeon: Spencer Celeste MD;  Location: [facility] OR     BIOPSY  2000     BYPASS GRAFT FEMOROPOPLITEAL Left 2019    Procedure: LEFT FEMORAL TO ABOVE KNEE POPLITEAL  BYPASS WITH POLYTETRAFLUOROETHYLENE GRAFT;  Surgeon: Shade Owens MD;  Location:  OR     COLONOSCOPY N/A 2016    Procedure: COMBINED COLONOSCOPY, SINGLE OR MULTIPLE BIOPSY/POLYPECTOMY BY BIOPSY;  Surgeon: Varun Stanley MD, MD;  Location:  GI     COLONOSCOPY N/A 12/10/2021    Procedure: COLONOSCOPY, WITH POLYPECTOMIES  USING COLD  SNARE,   CLIP APPLIED X2;  Surgeon: Dayton Luna MD;  Location:  GI     CYSTOSCOPY       EXCISION MALIG LESION>1.25CM  2000    Myxoid Liposarcoma       EXPLORE GROIN Right 2018    Procedure: EXPLORE GROIN;  EMERGENCY RIGHT FEMORAL EXPLORATION WITH FEMORAL ARTERY REPAIR.    EBL: 50mL;  Surgeon: Shade Owens MD;  Location: [facility] OR     HC COLP CERVIX/UPPER VAGINA  1997    Negative     HC DILATION/CURETTAGE DIAG/THER NON OB  1997    Post menopausal bleeding on HRT, negative      HIP SURGERY Left 04/13/2019     IR ANGIOGRAM THROUGH CATHETER FOLLOW UP  12/20/2018     IR ANGIOGRAM THROUGH CATHETER FOLLOW UP  12/21/2018     IR LOWER EXTREMITY ANGIOGRAM LEFT  12/19/2018     OPEN REDUCTION INTERNAL FIXATION PATELLA Left 12/21/2020    Procedure: Left partial patella fracture excision with advancement of the quadriceps tendon;  Surgeon: Stephen Rhodes MD;  Location: [facility] OR     OPEN REDUCTION INTERNAL FIXATION RODDING INTRAMEDULLARY TIBIA Left 12/21/2020    Procedure: Open reduction intramedullary nailing of left tibia fracture;  Surgeon: Stephen Rhodes MD;  Location: [facility] OR     REMOVE HARDWARE KNEE Left 5/31/2022    Procedure: Left knee patella hardware removal;  Surgeon: Spencer Celeste MD;  Location: [facility] OR     REPAIR TENDON QUADRICEPS Left 7/28/2021    Procedure: Left knee quadricepsplasty with intraoperative patellar fracture requiring open reduction and internal fixation of the patella;  Surgeon: Spencer Celeste MD;  Location: [facility] OR     REPAIR TENDON QUADRICEPS Left 5/31/2022    Procedure: Open left knee VY quadricepsplasty with hardware removal and allograft;  Surgeon: Spencer Celeste MD;  Location: [facility] OR     VASCULAR SURGERY  04/30/2018    Left SFA stent in bypass graft     ZZC APPENDECTOMY      Appendectomy     ZZC NONSPECIFIC PROCEDURE  04/2000    Open Biopsy Left Thigh Liposarcoma     ZZHC COLONOSCOPY THRU STOMA, DIAGNOSTIC  2006    due 2010         SOCIAL HISTORY:  Social History     Socioeconomic History     Marital status:      Spouse name: Chris     Number of children: 3     Years of education: 14     Highest education level: Associate degree: academic program   Occupational History     Occupation: at home     Employer: NONE    Tobacco Use     Smoking status: Never     Smokeless tobacco: Never   Vaping Use     Vaping Use: Never used   Substance and Sexual Activity     Alcohol use: No     Drug use: No     Sexual activity: Yes     Partners:  Male     Birth control/protection: Post-menopausal     Comment:  had a vasectomy   Other Topics Concern     Parent/sibling w/ CABG, MI or angioplasty before 65F 55M? No   Social History Narrative     Not on file     Social Determinants of Health     Financial Resource Strain: Low Risk      Difficulty of Paying Living Expenses: Not hard at all   Food Insecurity: No Food Insecurity     Worried About Running Out of Food in the Last Year: Never true     Ran Out of Food in the Last Year: Never true   Transportation Needs: No Transportation Needs     Lack of Transportation (Medical): No     Lack of Transportation (Non-Medical): No   Physical Activity: Inactive     Days of Exercise per Week: 0 days     Minutes of Exercise per Session: 0 min   Stress: Stress Concern Present     Feeling of Stress : To some extent   Social Connections: Moderately Integrated     Frequency of Communication with Friends and Family: Never     Frequency of Social Gatherings with Friends and Family: Never     Attends [employer] Services: More than 4 times per year     Active Member of Clubs or Organizations: Yes     Attends Club or Organization Meetings: Not on file     Marital Status:    Intimate Partner Violence: Not on file   Housing Stability: Low Risk      Unable to Pay for Housing in the Last Year: No     Number of Places Lived in the Last Year: 1     Unstable Housing in the Last Year: No         FAMILY HISTORY:  Family History   Problem Relation Age of Onset     C.A.D. Father         MI 57     Alcohol/Drug Father         etoh     Coronary Artery Disease Father      Obesity Mother      Osteoporosis Mother      Colon Cancer Brother 70     Hyperlipidemia Son      Anxiety Disorder Son      Hyperlipidemia Son         very high, experimental drug     C.A.D. Paternal Grandmother         ascvd     Diabetes Maternal Grandmother      Cancer Maternal Grandmother      C.A.D. Paternal Uncle         Mi  age 48     Cancer Maternal Aunt  "        pancreatic CA     Hyperlipidemia Son      Hyperlipidemia Cousin      Brother with colon cancer.  Negative for breast, ovarian or prostate cancer.    PHYSICAL EXAM:  Vital signs:  BP (!) 144/82   Pulse 67   Resp 16   Ht 1.676 m (5' 6\")   Wt 69.2 kg (152 lb 9.6 oz)   LMP 2005   SpO2 98%   BMI 24.63 kg/m     ECO  GENERAL/CONSTITUTIONAL: No acute distress.  EYES:  No scleral icterus.  ENT/MOUTH: Neck supple. Mask in place.  LYMPH: No cervical, supraclavicular, axillary adenopathy.   BREAST: Symmetric.  No palpable masses in either breast.  Tiny ecchymosis over the right upper inner breast.  Nipples are everted bilaterally with no discharge.  RESPIRATORY: Clear to auscultation bilaterally. No crackles or wheezing.   CARDIOVASCULAR: Regular rate and rhythm without murmurs, gallops, or rubs.  GASTROINTESTINAL: No hepatosplenomegaly, masses, or tenderness.  No guarding.  No distention.  MUSCULOSKELETAL: Warm and well-perfused, no cyanosis, clubbing; lymphedema of left leg up to upper thigh and compression stocking.  No edema of the right leg.  NEUROLOGIC: No focal motor deficits. Alert, oriented, answers questions appropriately.  INTEGUMENTARY: No rashes or jaundice.  GAIT: Steady, does not use assistive device      LABS:  CBC RESULTS: Recent Labs   Lab Test 22  1735   WBC 5.6   RBC 4.51   HGB 13.5   HCT 42.5   MCV 94   MCH 29.9   MCHC 31.8   RDW 14.4          Recent Labs   Lab Test 22  0732 22  1917 22  1345 22  0859   NA  --   --  139 140   POTASSIUM  --   --  3.9 4.5   CHLORIDE  --   --  106 108   CO2  --   --  29 27   ANIONGAP  --   --  4 5   *  --  107* 84   BUN  --   --  17 15   CR  --  0.58 0.62 0.68   LONG  --   --  10.0 9.2         PATHOLOGY:  Reviewed as per HPI.    IMAGING:  Reviewed as per HPI.    ASSESSMENT/PLAN:  Angeli Jacobson is a 72 year old female with the following issues:  1.  Clinical prognostic stage IA, [id number], grade 2 " invasive ductal carcinoma of the right upper outer breast, ER positive (%), KY positive (70%), HER2/compa FISH negative  -I personally reviewed the breast imaging and biopsy pathology results with Angeli.  She has a very small, clinically favorably prognostic breast cancer.  - She will proceed with lumpectomy on 12/14/2022.  -Discussed possible role of Oncotype DX assay for predictive benefit of chemotherapy as well as additional prognostication if final pathology shows tumor is greater than 0.5 cm.  Discussed that Oncotype DX is not indicated for tumors less than 0.5 cm due to lack of chemotherapy benefit for such small tumors that are ER positive HER2 negative.  -Discussed potential role of adjuvant radiation therapy to reduce risk of local recurrence.  -She would benefit from adjuvant endocrine therapy, preferably with aromatase inhibitor such as anastrozole or, alternately, tamoxifen.  -I discussed the potential side effects of anastrozole, including but not limited to: fatigue, myalgias/arthralgias, bone density loss, decrease sexual drive, vaginal dryness, nausea, headache, difficulty sleeping, hot flashes, night sweats, small cardiovascular risk.  I do note her history of osteoporosis.  --I discussed the potential side effects of tamoxifen, which may include, but not be limited to: muscle aches, mood changes, hot flashes, night sweats, weight gain, fatigue, nausea, earlier cataract formation, and a small risk of blood clots and uterine cancer.  --Will arrange for DEXA scan.  If she has continued osteoporosis, I would recommend concurrent bone health agent such as IV Zometa or Prolia every 6 months concurrently with aromatase inhibitor therapy.  --Will obtain CMP today.  She had normal CBC from 12/8/2022 already.    2.  History of myxoid liposarcoma of left thigh  - Status post excision and radiation completed in 2000.  She did not receive chemotherapy for her liposarcoma.  This has resulted in left  lower extremity lymphedema.  - Continue lymphedema therapy and compression stockings.    3.  Peripheral arterial disease  -Has history of left lower extremity peripheral arterial disease and is status post femoral-popliteal bypass with later stent to bypass.  She is on aspirin and Plavix.    4. Osteoporosis  --History of taking alendronate, off for past year.  - We will obtain more current DEXA scan.  - Continue adequate calcium and vitamin D.    She will be away on a trip 2/6-2/23/2023.  Discussed that we can work around this schedule for her adjuvant therapies and follow-up.    Will meet back after surgery to discuss adjuvant therapy.    Emilia Angulo MD  Hematology/Oncology  [facility]    Total time spent: 65 minutes in patient evaluation, counseling, documentation, and coordination of care.

## 2022-12-06 NOTE — PROGRESS NOTES
[facility]    Hematology/Oncology New Patient Note      Today's Date: 12/12/22    Reason for Consult:  Right breast cancer.    HISTORY OF PRESENT ILLNESS: Angeli Jacobson is a 72 year old female with history of left thigh liposarcoma status post excision in 2000 with subsequent left leg lymphedema and peripheral arterial disease status post redo left common femoral to popliteal artery bypass in January 2019, who presents with the following oncologic history:  1.  11/7/2022: Screening mammogram showed asymmetry in the right breast at 12:00, retroareolar far posterior.  Left breast negative.  2.  11/15/2022: Diagnostic right mammogram showed asymmetry in the posterior right breast, 8 cm from nipple.  Ultrasound of right breast at 12:00, 2 cm from nipple showed a small benign cyst but no definite sonographic correlate for the mammographic asymmetry.  3.  11/16/2022: Stereotactic guided right breast needle biopsy of 8 mm lesion at 3:00, 8 cm from the nipple showed grade 2 invasive ductal carcinoma, ER positive at %, OK positive at 60-70%, HER2 IHC = 2+ equivocal, HER2/cmopa FISH negative.    Angeli reports some tenderness at the right breast biopsy site.  Otherwise, no pain elsewhere.  She has some baseline tiredness, per her usual.    REVIEW OF SYSTEMS:   14 point ROS was reviewed and is negative other than as noted above in HPI.       HOME MEDICATIONS:  Current Outpatient Medications   Medication Sig Dispense Refill     acetaminophen (TYLENOL) 325 MG tablet Take 650 mg by mouth every 8 hours       aspirin (ASA) 81 MG chewable tablet Take 81 mg by mouth daily       calcium carbonate 600 mg-vitamin D 400 units (CALTRATE) 600-400 MG-UNIT per tablet Take 1 chew tab by mouth daily       clopidogrel (PLAVIX) 75 MG tablet TAKE 1 TABLET BY MOUTH EVERY DAY 90 tablet 3     econazole nitrate 1 % external cream Apply topically daily (Patient taking differently: Apply topically daily Toes) 85 g 3      evolocumab (REPATHA) 140 MG/ML prefilled autoinjector Inject 1 mL (140 mg) Subcutaneous every 14 days 6 mL 3     ezetimibe (ZETIA) 10 MG tablet Take 1 tablet (10 mg) by mouth daily 90 tablet 3     ibuprofen (ADVIL/MOTRIN) 200 MG tablet Take 400 mg by mouth every 8 hours       levothyroxine (SYNTHROID/LEVOTHROID) 75 MCG tablet Take 1 tablet (75 mcg) by mouth daily 90 tablet 3     multivitamin, therapeutic (THERA-VIT) TABS tablet Take 1 tablet by mouth daily       nitroFURantoin macrocrystal-monohydrate (MACROBID) 100 MG capsule TAKE 1 CAPSULE BY MOUTH AFTER INTRCOURSE 30 capsule 3     rosuvastatin (CRESTOR) 20 MG tablet Take 1 tablet (20 mg) by mouth daily 90 tablet 3     solifenacin (VESICARE) 5 MG tablet Take 1 tablet (5 mg) by mouth daily 90 tablet 3     Vitamin D3 (CHOLECALCIFEROL) 25 mcg (1000 units) tablet Take 2 tablets (50 mcg) by mouth daily 60 tablet 0         ALLERGIES:  Allergies   Allergen Reactions     Celexa [Citalopram Hydrobromide]      Decreased libido         PAST MEDICAL HISTORY:  Past Medical History:   Diagnosis Date     * * * SBE PROPHYLAXIS * * * 1998    Amox 500mg, take 4 tabs one hour prior to procedure.Takes this because of lymphedema secondary from leg surgey     Antiplatelet or antithrombotic long-term use      Arrhythmia      Central serous retinopathy 2001    Resolved 9/2001     CHRONIC NECK PAIN 1995     Depressive disorder      Depressive disorder, not elsewhere classified 2001     Elevated coronary artery calcium score=7/1/21 08/03/2021     Elevated coronary artery calcium score=7/1/21 08/03/2021     History of blood transfusion 04/2019 12/2020    during left hip pinning, during surgery for patella     Lateral epicondylitis      MIXED HYPERLIPIDEMIA, LDL GOAL <160 1998    LDL goal < 160     Motion sickness      MYXOID LIPOSARCOMA 2000    Left thigh, S/P excision, radiation  at [facility]     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    CHRONIC LEFT THIGH LYMPHEDEMA     Nontoxic  multinodular goiter 2005    needs yearly US     Osteoporosis, unspecified      Other chronic pain     fx ribs left      Other lymphedema     left thigh, gets regular PT for this     Overactive bladder      PAD (peripheral artery disease) (H) 2018     PONV (postoperative nausea and vomiting)      SHINGLES      Sprain of lumbosacral (joint) (ligament)     right     Unspecified hearing loss     chronic tinnitus     Unspecified tinnitus      Gynecologic history:  Age of menarche at 11, , age of first pregnancy at 29, 5 years of OCP use, no HRT or fertility treatment.    PAST SURGICAL HISTORY:  Past Surgical History:   Procedure Laterality Date     ARTHROPLASTY HIP Left 10/4/2019    Procedure: Removal of left femoral hardware with a conversion to left total hip arthroplasty using a Biomet Annalisa femoral stem with an OsseoTi acetabular shell and a dual mobility bearing surface;  Surgeon: Spencer Celeste MD;  Location: [facility] OR     BIOPSY  2000     BYPASS GRAFT FEMOROPOPLITEAL Left 2019    Procedure: LEFT FEMORAL TO ABOVE KNEE POPLITEAL  BYPASS WITH POLYTETRAFLUOROETHYLENE GRAFT;  Surgeon: Shade Owens MD;  Location:  OR     COLONOSCOPY N/A 2016    Procedure: COMBINED COLONOSCOPY, SINGLE OR MULTIPLE BIOPSY/POLYPECTOMY BY BIOPSY;  Surgeon: Varun Stanley MD, MD;  Location:  GI     COLONOSCOPY N/A 12/10/2021    Procedure: COLONOSCOPY, WITH POLYPECTOMIES  USING COLD  SNARE,   CLIP APPLIED X2;  Surgeon: Dayton Luna MD;  Location:  GI     CYSTOSCOPY       EXCISION MALIG LESION>1.25CM  2000    Myxoid Liposarcoma       EXPLORE GROIN Right 2018    Procedure: EXPLORE GROIN;  EMERGENCY RIGHT FEMORAL EXPLORATION WITH FEMORAL ARTERY REPAIR.    EBL: 50mL;  Surgeon: Shade Owens MD;  Location: [facility] OR     HC COLP CERVIX/UPPER VAGINA  1997    Negative     HC DILATION/CURETTAGE DIAG/THER NON OB  1997    Post menopausal bleeding on HRT, negative      HIP SURGERY Left 04/13/2019     IR ANGIOGRAM THROUGH CATHETER FOLLOW UP  12/20/2018     IR ANGIOGRAM THROUGH CATHETER FOLLOW UP  12/21/2018     IR LOWER EXTREMITY ANGIOGRAM LEFT  12/19/2018     OPEN REDUCTION INTERNAL FIXATION PATELLA Left 12/21/2020    Procedure: Left partial patella fracture excision with advancement of the quadriceps tendon;  Surgeon: Stephen Rhodes MD;  Location: [facility] OR     OPEN REDUCTION INTERNAL FIXATION RODDING INTRAMEDULLARY TIBIA Left 12/21/2020    Procedure: Open reduction intramedullary nailing of left tibia fracture;  Surgeon: Stephen Rhodes MD;  Location: [facility] OR     REMOVE HARDWARE KNEE Left 5/31/2022    Procedure: Left knee patella hardware removal;  Surgeon: Spencer Celeste MD;  Location: [facility] OR     REPAIR TENDON QUADRICEPS Left 7/28/2021    Procedure: Left knee quadricepsplasty with intraoperative patellar fracture requiring open reduction and internal fixation of the patella;  Surgeon: Spencer Celeste MD;  Location: [facility] OR     REPAIR TENDON QUADRICEPS Left 5/31/2022    Procedure: Open left knee VY quadricepsplasty with hardware removal and allograft;  Surgeon: Spencer Celeste MD;  Location: [facility] OR     VASCULAR SURGERY  04/30/2018    Left SFA stent in bypass graft     ZZC APPENDECTOMY      Appendectomy     ZZC NONSPECIFIC PROCEDURE  04/2000    Open Biopsy Left Thigh Liposarcoma     ZZHC COLONOSCOPY THRU STOMA, DIAGNOSTIC  2006    due 2010         SOCIAL HISTORY:  Social History     Socioeconomic History     Marital status:      Spouse name: Chris     Number of children: 3     Years of education: 14     Highest education level: Associate degree: academic program   Occupational History     Occupation: at home     Employer: NONE    Tobacco Use     Smoking status: Never     Smokeless tobacco: Never   Vaping Use     Vaping Use: Never used   Substance and Sexual Activity     Alcohol use: No     Drug use: No     Sexual activity: Yes     Partners:  Male     Birth control/protection: Post-menopausal     Comment:  had a vasectomy   Other Topics Concern     Parent/sibling w/ CABG, MI or angioplasty before 65F 55M? No   Social History Narrative     Not on file     Social Determinants of Health     Financial Resource Strain: Low Risk      Difficulty of Paying Living Expenses: Not hard at all   Food Insecurity: No Food Insecurity     Worried About Running Out of Food in the Last Year: Never true     Ran Out of Food in the Last Year: Never true   Transportation Needs: No Transportation Needs     Lack of Transportation (Medical): No     Lack of Transportation (Non-Medical): No   Physical Activity: Inactive     Days of Exercise per Week: 0 days     Minutes of Exercise per Session: 0 min   Stress: Stress Concern Present     Feeling of Stress : To some extent   Social Connections: Moderately Integrated     Frequency of Communication with Friends and Family: Never     Frequency of Social Gatherings with Friends and Family: Never     Attends Scientologist Services: More than 4 times per year     Active Member of Clubs or Organizations: Yes     Attends Club or Organization Meetings: Not on file     Marital Status:    Intimate Partner Violence: Not on file   Housing Stability: Low Risk      Unable to Pay for Housing in the Last Year: No     Number of Places Lived in the Last Year: 1     Unstable Housing in the Last Year: No         FAMILY HISTORY:  Family History   Problem Relation Age of Onset     C.A.D. Father         MI 57     Alcohol/Drug Father         etoh     Coronary Artery Disease Father      Obesity Mother      Osteoporosis Mother      Colon Cancer Brother 70     Hyperlipidemia Son      Anxiety Disorder Son      Hyperlipidemia Son         very high, experimental drug     C.A.D. Paternal Grandmother         ascvd     Diabetes Maternal Grandmother      Cancer Maternal Grandmother      C.A.D. Paternal Uncle         Mi  age 48     Cancer Maternal Aunt  "        pancreatic CA     Hyperlipidemia Son      Hyperlipidemia Cousin      Brother with colon cancer.  Negative for breast, ovarian or prostate cancer.    PHYSICAL EXAM:  Vital signs:  BP (!) 144/82   Pulse 67   Resp 16   Ht 1.676 m (5' 6\")   Wt 69.2 kg (152 lb 9.6 oz)   LMP 2005   SpO2 98%   BMI 24.63 kg/m     ECO  GENERAL/CONSTITUTIONAL: No acute distress.  EYES:  No scleral icterus.  ENT/MOUTH: Neck supple. Mask in place.  LYMPH: No cervical, supraclavicular, axillary adenopathy.   BREAST: Symmetric.  No palpable masses in either breast.  Tiny ecchymosis over the right upper inner breast.  Nipples are everted bilaterally with no discharge.  RESPIRATORY: Clear to auscultation bilaterally. No crackles or wheezing.   CARDIOVASCULAR: Regular rate and rhythm without murmurs, gallops, or rubs.  GASTROINTESTINAL: No hepatosplenomegaly, masses, or tenderness.  No guarding.  No distention.  MUSCULOSKELETAL: Warm and well-perfused, no cyanosis, clubbing; lymphedema of left leg up to upper thigh and compression stocking.  No edema of the right leg.  NEUROLOGIC: No focal motor deficits. Alert, oriented, answers questions appropriately.  INTEGUMENTARY: No rashes or jaundice.  GAIT: Steady, does not use assistive device      LABS:  CBC RESULTS: Recent Labs   Lab Test 22  1735   WBC 5.6   RBC 4.51   HGB 13.5   HCT 42.5   MCV 94   MCH 29.9   MCHC 31.8   RDW 14.4          Recent Labs   Lab Test 22  0732 22  1917 22  1345 22  0859   NA  --   --  139 140   POTASSIUM  --   --  3.9 4.5   CHLORIDE  --   --  106 108   CO2  --   --  29 27   ANIONGAP  --   --  4 5   *  --  107* 84   BUN  --   --  17 15   CR  --  0.58 0.62 0.68   LONG  --   --  10.0 9.2         PATHOLOGY:  Reviewed as per HPI.    IMAGING:  Reviewed as per HPI.    ASSESSMENT/PLAN:  Angeli Jacobson is a 72 year old female with the following issues:  1.  Clinical prognostic stage IA, [id number], grade 2 " invasive ductal carcinoma of the right upper outer breast, ER positive (%), CT positive (70%), HER2/compa FISH negative  -I personally reviewed the breast imaging and biopsy pathology results with Angeli.  She has a very small, clinically favorably prognostic breast cancer.  - She will proceed with lumpectomy on 12/14/2022.  -Discussed possible role of Oncotype DX assay for predictive benefit of chemotherapy as well as additional prognostication if final pathology shows tumor is greater than 0.5 cm.  Discussed that Oncotype DX is not indicated for tumors less than 0.5 cm due to lack of chemotherapy benefit for such small tumors that are ER positive HER2 negative.  -Discussed potential role of adjuvant radiation therapy to reduce risk of local recurrence.  -She would benefit from adjuvant endocrine therapy, preferably with aromatase inhibitor such as anastrozole or, alternately, tamoxifen.  -I discussed the potential side effects of anastrozole, including but not limited to: fatigue, myalgias/arthralgias, bone density loss, decrease sexual drive, vaginal dryness, nausea, headache, difficulty sleeping, hot flashes, night sweats, small cardiovascular risk.  I do note her history of osteoporosis.  --I discussed the potential side effects of tamoxifen, which may include, but not be limited to: muscle aches, mood changes, hot flashes, night sweats, weight gain, fatigue, nausea, earlier cataract formation, and a small risk of blood clots and uterine cancer.  --Will arrange for DEXA scan.  If she has continued osteoporosis, I would recommend concurrent bone health agent such as IV Zometa or Prolia every 6 months concurrently with aromatase inhibitor therapy.  --Will obtain CMP today.  She had normal CBC from 12/8/2022 already.    2.  History of myxoid liposarcoma of left thigh  - Status post excision and radiation completed in 2000.  She did not receive chemotherapy for her liposarcoma.  This has resulted in left  lower extremity lymphedema.  - Continue lymphedema therapy and compression stockings.    3.  Peripheral arterial disease  -Has history of left lower extremity peripheral arterial disease and is status post femoral-popliteal bypass with later stent to bypass.  She is on aspirin and Plavix.    4. Osteoporosis  --History of taking alendronate, off for past year.  - We will obtain more current DEXA scan.  - Continue adequate calcium and vitamin D.    She will be away on a trip 2/6-2/23/2023.  Discussed that we can work around this schedule for her adjuvant therapies and follow-up.    Will meet back after surgery to discuss adjuvant therapy.    Emilia Angulo MD  Hematology/Oncology  [facility]    Total time spent: 65 minutes in patient evaluation, counseling, documentation, and coordination of care.

## 2022-12-08 ENCOUNTER — OFFICE VISIT (OUTPATIENT)
Dept: FAMILY MEDICINE | Facility: CLINIC | Age: 72
End: 2022-12-08
Payer: COMMERCIAL

## 2022-12-08 VITALS
OXYGEN SATURATION: 98 % | HEIGHT: 66 IN | SYSTOLIC BLOOD PRESSURE: 118 MMHG | HEART RATE: 80 BPM | BODY MASS INDEX: 25.2 KG/M2 | DIASTOLIC BLOOD PRESSURE: 68 MMHG | WEIGHT: 156.8 LBS

## 2022-12-08 DIAGNOSIS — Z01.818 PREOP GENERAL PHYSICAL EXAM: Primary | ICD-10-CM

## 2022-12-08 DIAGNOSIS — E03.9 HYPOTHYROIDISM, UNSPECIFIED TYPE: ICD-10-CM

## 2022-12-08 DIAGNOSIS — C50.911 MALIGNANT NEOPLASM OF RIGHT FEMALE BREAST, UNSPECIFIED ESTROGEN RECEPTOR STATUS, UNSPECIFIED SITE OF BREAST (H): ICD-10-CM

## 2022-12-08 LAB
ERYTHROCYTE [DISTWIDTH] IN BLOOD BY AUTOMATED COUNT: 14.4 % (ref 10–15)
HCT VFR BLD AUTO: 42.5 % (ref 35–47)
HGB BLD-MCNC: 13.5 G/DL (ref 11.7–15.7)
MCH RBC QN AUTO: 29.9 PG (ref 26.5–33)
MCHC RBC AUTO-ENTMCNC: 31.8 G/DL (ref 31.5–36.5)
MCV RBC AUTO: 94 FL (ref 78–100)
PLATELET # BLD AUTO: 252 10E3/UL (ref 150–450)
RBC # BLD AUTO: 4.51 10E6/UL (ref 3.8–5.2)
WBC # BLD AUTO: 5.6 10E3/UL (ref 4–11)

## 2022-12-08 PROCEDURE — 93000 ELECTROCARDIOGRAM COMPLETE: CPT | Performed by: PHYSICIAN ASSISTANT

## 2022-12-08 PROCEDURE — 85027 COMPLETE CBC AUTOMATED: CPT | Performed by: PHYSICIAN ASSISTANT

## 2022-12-08 PROCEDURE — 36415 COLL VENOUS BLD VENIPUNCTURE: CPT | Performed by: PHYSICIAN ASSISTANT

## 2022-12-08 PROCEDURE — 99214 OFFICE O/P EST MOD 30 MIN: CPT | Performed by: PHYSICIAN ASSISTANT

## 2022-12-08 PROCEDURE — 84443 ASSAY THYROID STIM HORMONE: CPT

## 2022-12-08 PROCEDURE — 80053 COMPREHEN METABOLIC PANEL: CPT

## 2022-12-08 ASSESSMENT — ANXIETY QUESTIONNAIRES
8. IF YOU CHECKED OFF ANY PROBLEMS, HOW DIFFICULT HAVE THESE MADE IT FOR YOU TO DO YOUR WORK, TAKE CARE OF THINGS AT HOME, OR GET ALONG WITH OTHER PEOPLE?: NOT DIFFICULT AT ALL
7. FEELING AFRAID AS IF SOMETHING AWFUL MIGHT HAPPEN: NOT AT ALL
7. FEELING AFRAID AS IF SOMETHING AWFUL MIGHT HAPPEN: NOT AT ALL
1. FEELING NERVOUS, ANXIOUS, OR ON EDGE: NOT AT ALL
GAD7 TOTAL SCORE: 0
2. NOT BEING ABLE TO STOP OR CONTROL WORRYING: NOT AT ALL
GAD7 TOTAL SCORE: 0
GAD7 TOTAL SCORE: 0
4. TROUBLE RELAXING: NOT AT ALL
IF YOU CHECKED OFF ANY PROBLEMS ON THIS QUESTIONNAIRE, HOW DIFFICULT HAVE THESE PROBLEMS MADE IT FOR YOU TO DO YOUR WORK, TAKE CARE OF THINGS AT HOME, OR GET ALONG WITH OTHER PEOPLE: NOT DIFFICULT AT ALL
5. BEING SO RESTLESS THAT IT IS HARD TO SIT STILL: NOT AT ALL
6. BECOMING EASILY ANNOYED OR IRRITABLE: NOT AT ALL
3. WORRYING TOO MUCH ABOUT DIFFERENT THINGS: NOT AT ALL

## 2022-12-08 ASSESSMENT — PATIENT HEALTH QUESTIONNAIRE - PHQ9
SUM OF ALL RESPONSES TO PHQ QUESTIONS 1-9: 1
10. IF YOU CHECKED OFF ANY PROBLEMS, HOW DIFFICULT HAVE THESE PROBLEMS MADE IT FOR YOU TO DO YOUR WORK, TAKE CARE OF THINGS AT HOME, OR GET ALONG WITH OTHER PEOPLE: NOT DIFFICULT AT ALL
SUM OF ALL RESPONSES TO PHQ QUESTIONS 1-9: 1

## 2022-12-08 NOTE — PROGRESS NOTES
[facility]  [id number] [place] [id number]  Phone: [phone]  Primary Provider: Sepideh Isabel  Pre-op Performing Provider: SEPIDEH ISABEL      PREOPERATIVE EVALUATION:  Today's date: 12/8/2022    Angeli Jacobson is a 72 year old female who presents for a preoperative evaluation.    Surgical Information:  Surgery/Procedure: Right breast tag localized lumpectomy with right sentinel lymph node biopsy  Surgery Location: [facility]   Surgeon: Shelly Carr MD  Surgery Date: 12/14/22  Time of Surgery: 9:10  Where patient plans to recover: At home with family  Fax number for surgical facility: Note does not need to be faxed, will be available electronically in Epic.    Type of Anesthesia Anticipated: General    Assessment & Plan     The proposed surgical procedure is considered INTERMEDIATE risk.    Preop general physical exam    - CBC with platelets; Future  - EKG 12-lead complete w/read - Clinics    Malignant neoplasm of right female breast, unspecified estrogen receptor status, unspecified site of breast (H)      Hypothyroidism, unspecified type                 Risks and Recommendations:  The patient has the following additional risks and recommendations for perioperative complications:        Medication Instructions:   - aspirin: Discontinue aspirin 7-10 days prior to procedure to reduce bleeding risk. It should be resumed postoperatively.    - clopidrogel (Plavix), prasugrel (Effient), ticagrelor (Brilinta): No contraindication to stopping Plavix, HOLD 5-7 days before surgery.    - Statins: Continue taking on the day of surgery.     RECOMMENDATION:  APPROVAL GIVEN to proceed with proposed procedure, without further diagnostic evaluation.            Subjective     HPI related to upcoming procedure: malignant neoplasm right breast    Preop Questions 12/4/2022   1. Have you ever had a heart attack or stroke? No   2. Have you ever had surgery on your  heart or blood vessels, such as a stent placement, a coronary artery bypass, or surgery on an artery in your head, neck, heart, or legs? Fem-pop bypass   3. Do you have chest pain with activity? No   4. Do you have a history of  heart failure? No   5. Do you currently have a cold, bronchitis or symptoms of other infection? No   6. Do you have a cough, shortness of breath, or wheezing? No   7. Do you or anyone in your family have previous history of blood clots? No   8. Do you or does anyone in your family have a serious bleeding problem such as prolonged bleeding following surgeries or cuts? No   9. Have you ever had problems with anemia or been told to take iron pills? No   10. Have you had any abnormal blood loss such as black, tarry or bloody stools, or abnormal vaginal bleeding? No   11. Have you ever had a blood transfusion? YES -    11a. Have you ever had a transfusion reaction? No   12. Are you willing to have a blood transfusion if it is medically needed before, during, or after your surgery? Yes   13. Have you or any of your relatives ever had problems with anesthesia? Nausea with anesthesia    14. Do you have sleep apnea, excessive snoring or daytime drowsiness? No   14a. Do you have a CPAP machine? No   15. Do you have any artifical heart valves or other implanted medical devices like a pacemaker, defibrillator, or continuous glucose monitor? No   16. Do you have artificial joints? YES - left hip   17. Are you allergic to latex? No   18. Is there any chance that you may be pregnant? -       Health Care Directive:  Patient has a Health Care Directive on file      Preoperative Review of :   reviewed - no record of controlled substances prescribed.      Status of Chronic Conditions:  See problem list for active medical problems.  Problems all longstanding and stable, except as noted/documented.  See ROS for pertinent symptoms related to these conditions.      Review of Systems  CONSTITUTIONAL: NEGATIVE  for fever, chills, change in weight  INTEGUMENTARY/SKIN: NEGATIVE for worrisome rashes, moles or lesions  EYES: NEGATIVE for vision changes or irritation  ENT/MOUTH: NEGATIVE for ear, mouth and throat problems  RESP: NEGATIVE for significant cough or SOB  CV: NEGATIVE for chest pain, palpitations or peripheral edema  GI: NEGATIVE for nausea, abdominal pain, heartburn, or change in bowel habits  : NEGATIVE for frequency, dysuria, or hematuria  NEURO: NEGATIVE for weakness, dizziness or paresthesias  ENDOCRINE: NEGATIVE for temperature intolerance, skin/hair changes  HEME: NEGATIVE for bleeding problems  PSYCHIATRIC: NEGATIVE for changes in mood or affect    Patient Active Problem List    Diagnosis Date Noted     Arthrofibrosis of knee joint, left 05/31/2022     Priority: Medium     Elevated coronary artery calcium score=7/1/21 08/03/2021     Priority: Medium     Other specified injury of left quadriceps muscle, fascia and tendon, initial encounter 07/28/2021     Priority: Medium     Closed fracture of left tibia and fibula, initial encounter 12/19/2020     Priority: Medium     Closed displaced fracture of left patella, unspecified fracture morphology, initial encounter 12/19/2020     Priority: Medium     Acute pain of left knee 10/29/2019     Priority: Medium     S/P total hip arthroplasty 10/04/2019     Priority: Medium     Myalgia of pelvic floor 09/11/2019     Priority: Medium     Lesion of bladder 09/11/2019     Priority: Medium     Postural urinary incontinence 07/17/2019     Priority: Medium     Personal history of urinary tract infection 07/17/2019     Priority: Medium     OAB (overactive bladder) 07/17/2019     Priority: Medium     S/P ORIF (open reduction internal fixation) fracture 05/02/2019     Priority: Medium     Hip pain, left 05/02/2019     Priority: Medium     History of sarcoma 01/21/2019     Priority: Medium     Femoral-popliteal bypass graft occlusion, left (H) 12/19/2018     Priority: Medium      Vascular graft occlusion (H) 04/30/2018     Priority: Medium     PAD (peripheral artery disease) (H) 04/20/2018     Priority: Medium     Vertigo 06/27/2017     Priority: Medium     Chronic pain of left knee 05/26/2017     Priority: Medium     Tubular adenoma 02/09/2016     Priority: Medium     Lymphedema of left leg 10/14/2014     Priority: Medium     Due to cancer       Osteoporosis 06/19/2008     Priority: Medium     Problem list name updated by automated process. 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     Priority: Medium     CHRONIC LEFT THIGH LYMPHEDEMA       Impaired fasting glucose 06/16/2010     Priority: Low     Hearing loss 01/29/2007     Priority: Low     Has hearing aids        Past Medical History:   Diagnosis Date     * * * SBE PROPHYLAXIS * * * 1998    Amox 500mg, take 4 tabs one hour prior to procedure.Takes this because of lymphedema secondary from leg surgey     Antiplatelet or antithrombotic long-term use      Arrhythmia      Central serous retinopathy 2001    Resolved 9/2001     CHRONIC NECK PAIN 1995     Depressive disorder      Depressive disorder, not elsewhere classified 2001     Elevated coronary artery calcium score=7/1/21 08/03/2021     Elevated coronary artery calcium score=7/1/21 08/03/2021     History of blood transfusion 04/2019 12/2020    during left hip pinning, during surgery for patella     Lateral epicondylitis      MIXED HYPERLIPIDEMIA, LDL GOAL <160 1998    LDL goal < 160      Motion sickness      MYXOID LIPOSARCOMA 2000    Left thigh, S/P excision, radiation  at [facility]     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    CHRONIC LEFT THIGH LYMPHEDEMA     Nontoxic multinodular goiter 2005    needs yearly US     Osteoporosis, unspecified 2001     Other chronic pain     fx ribs left      Other lymphedema 2000    left thigh, gets regular PT for this     Overactive bladder      PAD (peripheral artery disease) (H) 04/20/2018     PONV (postoperative nausea and vomiting)      SHINGLES 2001     Sprain of lumbosacral (joint) (ligament) 1995    right     Unspecified hearing loss 1998    chronic tinnitus     Unspecified tinnitus 1998     Past Surgical History:   Procedure Laterality Date     ARTHROPLASTY HIP Left 10/4/2019    Procedure: Removal of left femoral hardware with a conversion to left total hip arthroplasty using a Biomet Annalisa femoral stem with an OsseoTi acetabular shell and a dual mobility bearing surface;  Surgeon: Spencer Celeste MD;  Location: [facility] OR     BIOPSY  April 2000     BYPASS GRAFT FEMOROPOPLITEAL Left 1/21/2019    Procedure: LEFT FEMORAL TO ABOVE KNEE POPLITEAL  BYPASS WITH POLYTETRAFLUOROETHYLENE GRAFT;  Surgeon: Shade Owens MD;  Location:  OR     COLONOSCOPY N/A 2/5/2016    Procedure: COMBINED COLONOSCOPY, SINGLE OR MULTIPLE BIOPSY/POLYPECTOMY BY BIOPSY;  Surgeon: Varun Stanley MD, MD;  Location:  GI     COLONOSCOPY N/A 12/10/2021    Procedure: COLONOSCOPY, WITH POLYPECTOMIES  USING COLD  SNARE,   CLIP APPLIED X2;  Surgeon: Dayton Luna MD;  Location:  GI     CYSTOSCOPY       EXCISION MALIG LESION>1.25CM  5/2000    Myxoid Liposarcoma       EXPLORE GROIN Right 5/1/2018    Procedure: EXPLORE GROIN;  EMERGENCY RIGHT FEMORAL EXPLORATION WITH FEMORAL ARTERY REPAIR.    EBL: 50mL;  Surgeon: Shade Owens MD;  Location:  OR     HC COLP CERVIX/UPPER VAGINA  07/1997    Negative     HC DILATION/CURETTAGE DIAG/THER NON OB  02/1997    Post menopausal  bleeding on HRT, negative     HIP SURGERY Left 04/13/2019     IR ANGIOGRAM THROUGH CATHETER FOLLOW UP  12/20/2018     IR ANGIOGRAM THROUGH CATHETER FOLLOW UP  12/21/2018     IR LOWER EXTREMITY ANGIOGRAM LEFT  12/19/2018     OPEN REDUCTION INTERNAL FIXATION PATELLA Left 12/21/2020    Procedure: Left partial patella fracture excision with advancement of the quadriceps tendon;  Surgeon: Stephen Rhodes MD;  Location: [facility] OR     OPEN REDUCTION INTERNAL FIXATION RODDING INTRAMEDULLARY TIBIA Left 12/21/2020    Procedure: Open reduction intramedullary nailing of left tibia fracture;  Surgeon: Stephen Rhodes MD;  Location: [facility] OR     REMOVE HARDWARE KNEE Left 5/31/2022    Procedure: Left knee patella hardware removal;  Surgeon: Spencer Celeste MD;  Location: [facility] OR     REPAIR TENDON QUADRICEPS Left 7/28/2021    Procedure: Left knee quadricepsplasty with intraoperative patellar fracture requiring open reduction and internal fixation of the patella;  Surgeon: Spencer Celeste MD;  Location: [facility] OR     REPAIR TENDON QUADRICEPS Left 5/31/2022    Procedure: Open left knee VY quadricepsplasty with hardware removal and allograft;  Surgeon: Spencer Celeste MD;  Location: [facility] OR     VASCULAR SURGERY  04/30/2018    Left SFA stent in bypass graft     ZC APPENDECTOMY      Appendectomy     ZZC NONSPECIFIC PROCEDURE  04/2000    Open Biopsy Left Thigh Liposarcoma     ZZ COLONOSCOPY THRU STOMA, DIAGNOSTIC  2006    due 2010     Current Outpatient Medications   Medication Sig Dispense Refill     aspirin (ASA) 81 MG chewable tablet Take 81 mg by mouth daily       calcium carbonate 600 mg-vitamin D 400 units (CALTRATE) 600-400 MG-UNIT per tablet Take 1 chew tab by mouth daily       clopidogrel (PLAVIX) 75 MG tablet TAKE 1 TABLET BY MOUTH EVERY DAY 90 tablet 3     econazole nitrate 1 % external cream Apply topically daily (Patient taking differently: Apply topically daily Toes) 85 g 3     evolocumab (REPATHA)  140 MG/ML prefilled autoinjector Inject 1 mL (140 mg) Subcutaneous every 14 days 6 mL 3     ezetimibe (ZETIA) 10 MG tablet Take 1 tablet (10 mg) by mouth daily 90 tablet 3     levothyroxine (SYNTHROID/LEVOTHROID) 75 MCG tablet Take 1 tablet (75 mcg) by mouth daily 90 tablet 3     multivitamin, therapeutic (THERA-VIT) TABS tablet Take 1 tablet by mouth daily       nitroFURantoin macrocrystal-monohydrate (MACROBID) 100 MG capsule TAKE 1 CAPSULE BY MOUTH AFTER INTRCOURSE 30 capsule 3     rosuvastatin (CRESTOR) 20 MG tablet Take 1 tablet (20 mg) by mouth daily 90 tablet 3     solifenacin (VESICARE) 5 MG tablet Take 1 tablet (5 mg) by mouth daily 90 tablet 3     Vitamin D3 (CHOLECALCIFEROL) 25 mcg (1000 units) tablet Take 2 tablets (50 mcg) by mouth daily 60 tablet 0     acetaminophen (TYLENOL) 325 MG tablet Take 650 mg by mouth every 8 hours (Patient not taking: Reported on 2022)       ibuprofen (ADVIL/MOTRIN) 200 MG tablet Take 400 mg by mouth every 8 hours (Patient not taking: Reported on 2022)         Allergies   Allergen Reactions     Celexa [Citalopram Hydrobromide]      Decreased libido        Social History     Tobacco Use     Smoking status: Never     Smokeless tobacco: Never   Substance Use Topics     Alcohol use: Never     Family History   Problem Relation Age of Onset     C.A.D. Father         MI 57     Alcohol/Drug Father         etoh     Coronary Artery Disease Father      Obesity Mother      Osteoporosis Mother      Colon Cancer Brother      Hyperlipidemia Son      Anxiety Disorder Son      Hyperlipidemia Son         very high, experimental drug     C.A.D. Paternal Grandmother         ascvd     Diabetes Maternal Grandmother      Cancer Maternal Grandmother      C.A.D. Paternal Uncle         Mi  age 48     Cancer Maternal Aunt         pancreatic CA     Hyperlipidemia Son      Hyperlipidemia Cousin      History   Drug Use Unknown         Objective     /68 (BP Location: Right arm,  "Patient Position: Sitting, Cuff Size: Adult Regular)   Pulse 80   Ht 1.676 m (5' 6\")   Wt 71.1 kg (156 lb 12.8 oz)   LMP 03/18/2005   SpO2 98%   BMI 25.31 kg/m      Physical Exam    GENERAL APPEARANCE: healthy, alert and no distress     EYES: EOMI, PERRL     HENT: ear canals and TM's normal and nose and mouth without ulcers or lesions     NECK: no adenopathy, no asymmetry, masses, or scars and thyroid normal to palpation     RESP: lungs clear to auscultation - no rales, rhonchi or wheezes     CV: regular rates and rhythm, normal S1 S2, no S3 or S4 and no murmur, click or rub     ABDOMEN:  soft, nontender, no HSM or masses and bowel sounds normal     SKIN: no suspicious lesions or rashes     NEURO: Normal strength and tone, sensory exam grossly normal, mentation intact and speech normal     PSYCH: mentation appears normal. and affect normal/bright     LYMPHATICS: No cervical adenopathy    Recent Labs   Lab Test 07/27/22  1428 06/01/22  0732 05/31/22  1917 05/26/22  1345 03/02/22  0859   HGB 13.2 11.3*  --  14.0  --     207  --  242  --    NA  --   --   --  139 140   POTASSIUM  --   --   --  3.9 4.5   CR  --   --  0.58 0.62 0.68        Diagnostics:  Recent Results (from the past 24 hour(s))   CBC with platelets    Collection Time: 12/08/22  5:35 PM   Result Value Ref Range    WBC Count 5.6 4.0 - 11.0 10e3/uL    RBC Count 4.51 3.80 - 5.20 10e6/uL    Hemoglobin 13.5 11.7 - 15.7 g/dL    Hematocrit 42.5 35.0 - 47.0 %    MCV 94 78 - 100 fL    MCH 29.9 26.5 - 33.0 pg    MCHC 31.8 31.5 - 36.5 g/dL    RDW 14.4 10.0 - 15.0 %    Platelet Count 252 150 - 450 10e3/uL      EKG: appears normal, NSR, Right Bundle Branch Block, unchanged from previous tracings    Revised Cardiac Risk Index (RCRI):  The patient has the following serious cardiovascular risks for perioperative complications:   - Coronary Artery Disease (MI, positive stress test, angina, Qs on EKG) = 1 point     RCRI Interpretation: 1 point: Class II (low " risk - 0.9% complication rate)           Signed Electronically by: Sepideh Burris PA-C  Copy of this evaluation report is provided to requesting physician.      Answers for HPI/ROS submitted by the patient on 12/8/2022  If you checked off any problems, how difficult have these problems made it for you to do your work, take care of things at home, or get along with other people?: Not difficult at all  PHQ9 TOTAL SCORE: 1  CYNDI 7 TOTAL SCORE: 0

## 2022-12-08 NOTE — PROGRESS NOTES
RECORDS STATUS - BREAST    RECORDS REQUESTED FROM: EPIC   DATE REQUESTED: 12/12/2022    Malignant neoplasm of right breast    Action    Action Taken 12/8/2022 8:05AM KEB   I called pt Angeli - unavailable. I tried calling a second time- no luck.     I tried called her mobile phone- she answered her phone. No records are [facility].        NOTES DETAILS STATUS   OFFICE NOTE from referring provider COmplete ref by  [facility]  Complete 12/14/2022 Pre-Admit    OFFICE NOTE from surgeon Complete See Biopsy in [facility]   OPERATIVE REPORT Complete See Biopsy in EPIC   MEDICATION LIST Complete [facility]   CLINICAL TRIAL TREATMENTS TO DATE     LABS     REQUEST BLOCKS FOR ALL BREAST CANCER PTS     PATHOLOGY REPORTS  (Tissue diagnosis, Stage, ER/MA percentage positive and intensity of staining, HER2 IHC, FISH, and all biopsies from breast and any distant metastasis)                 Complete- internal biopsy in [facility] 11/16/2022  Right breast, 3:00, 8.0 cm from nipple, stereotactic guided needle biopsy-  -Invasive ductal carcinoma, [place] grade 2/3(Oscar score 6/9)  -Estrogen receptor is positive (>90%) and progesterone receptor is positive (60-70 %)  -Her 2 by IHC is equivocal (score 2 +)  -Her 2 by FISH is ordered and pending and will be reported separately   GENONOMIC TESTING     TYPE:   (Next Generation Sequencing, including Foundation One testing, and Oncotype score) Complete  11/16/2022 Her 2 Barrett    IMAGING (NEED IMAGES & REPORT)     CT SCANS     MRI     MAMMO Complete 12/14/2022, 12/13/2022   ULTRASOUND Complete US Breast 12/13/2022   NM lymph Complete 12/14/2022   PET     BONE SCAN     BRAIN MRI

## 2022-12-09 LAB — TSH SERPL DL<=0.005 MIU/L-ACNC: 1.18 UIU/ML (ref 0.3–4.2)

## 2022-12-12 ENCOUNTER — TELEPHONE (OUTPATIENT)
Dept: MAMMOGRAPHY | Facility: CLINIC | Age: 72
End: 2022-12-12

## 2022-12-12 ENCOUNTER — PRE VISIT (OUTPATIENT)
Dept: ONCOLOGY | Facility: CLINIC | Age: 72
End: 2022-12-12

## 2022-12-12 ENCOUNTER — ONCOLOGY VISIT (OUTPATIENT)
Dept: ONCOLOGY | Facility: CLINIC | Age: 72
End: 2022-12-12
Attending: SURGERY
Payer: COMMERCIAL

## 2022-12-12 VITALS
RESPIRATION RATE: 16 BRPM | WEIGHT: 152.6 LBS | BODY MASS INDEX: 24.53 KG/M2 | HEART RATE: 67 BPM | OXYGEN SATURATION: 98 % | SYSTOLIC BLOOD PRESSURE: 144 MMHG | HEIGHT: 66 IN | DIASTOLIC BLOOD PRESSURE: 82 MMHG

## 2022-12-12 DIAGNOSIS — C50.411 MALIGNANT NEOPLASM OF UPPER-OUTER QUADRANT OF RIGHT BREAST IN FEMALE, ESTROGEN RECEPTOR POSITIVE (H): Primary | ICD-10-CM

## 2022-12-12 DIAGNOSIS — Z85.831 HISTORY OF SARCOMA OF SOFT TISSUE: ICD-10-CM

## 2022-12-12 DIAGNOSIS — M81.0 AGE-RELATED OSTEOPOROSIS WITHOUT CURRENT PATHOLOGICAL FRACTURE: ICD-10-CM

## 2022-12-12 DIAGNOSIS — Z17.0 MALIGNANT NEOPLASM OF UPPER-OUTER QUADRANT OF RIGHT BREAST IN FEMALE, ESTROGEN RECEPTOR POSITIVE (H): Primary | ICD-10-CM

## 2022-12-12 LAB
ALBUMIN SERPL BCG-MCNC: 4.3 G/DL (ref 3.5–5.2)
ALP SERPL-CCNC: 84 U/L (ref 35–104)
ALT SERPL W P-5'-P-CCNC: 20 U/L (ref 10–35)
ANION GAP SERPL CALCULATED.3IONS-SCNC: 17 MMOL/L (ref 7–15)
AST SERPL W P-5'-P-CCNC: 27 U/L (ref 10–35)
BILIRUB SERPL-MCNC: 0.3 MG/DL
BUN SERPL-MCNC: 20.3 MG/DL (ref 8–23)
CALCIUM SERPL-MCNC: 10.2 MG/DL (ref 8.8–10.2)
CHLORIDE SERPL-SCNC: 104 MMOL/L (ref 98–107)
CREAT SERPL-MCNC: 0.77 MG/DL (ref 0.51–0.95)
DEPRECATED HCO3 PLAS-SCNC: 21 MMOL/L (ref 22–29)
GFR SERPL CREATININE-BSD FRML MDRD: 82 ML/MIN/1.73M2
GLUCOSE SERPL-MCNC: 82 MG/DL (ref 70–99)
POTASSIUM SERPL-SCNC: 4.6 MMOL/L (ref 3.4–5.3)
PROT SERPL-MCNC: 7 G/DL (ref 6.4–8.3)
SODIUM SERPL-SCNC: 142 MMOL/L (ref 136–145)

## 2022-12-12 PROCEDURE — 99205 OFFICE O/P NEW HI 60 MIN: CPT | Performed by: INTERNAL MEDICINE

## 2022-12-12 PROCEDURE — G0463 HOSPITAL OUTPT CLINIC VISIT: HCPCS | Performed by: INTERNAL MEDICINE

## 2022-12-12 ASSESSMENT — PAIN SCALES - GENERAL: PAINLEVEL: NO PAIN (0)

## 2022-12-12 NOTE — TELEPHONE ENCOUNTER
Breast Care Nurse Coordination:  Preoperative call placed to Angeli today to assess her needs, and check in on whether she has any questions or concerns regarding her upcoming breast surgery.    Patient was recently diagnosed with Right Breast Cancer and is scheduled to have a RFID Tag Localized Right Breast Lumpectomy with Right sentinel lymph node biopsy by Dr. Shelly Carr on 12/14/2022 at the [facility].    Angeli had her preop physical exam with her primary provider- MAURI Peña on 12/8/22.  She also has a post op visit scheduled with Dr. Shelly Carr on 1/3/22 @ 1:30 p.m.    Awaiting a return call from patient to discuss surgery preparations and what to expect the day of surgery and days following.    Jen Capps RN BSN  Breast Care Nurse Coordinator  [facility] [place]- [facility] [place]  [phone]

## 2022-12-12 NOTE — PROGRESS NOTES
"Oncology Rooming Note    December 12, 2022 11:03 AM   Angeli Jacobson is a 72 year old female who presents for:    Chief Complaint   Patient presents with     Oncology Clinic Visit     Initial Vitals: LMP 03/18/2005  Estimated body mass index is 25.31 kg/m  as calculated from the following:    Height as of 12/8/22: 1.676 m (5' 6\").    Weight as of 12/8/22: 71.1 kg (156 lb 12.8 oz). There is no height or weight on file to calculate BSA.  Data Unavailable Comment: Data Unavailable   Patient's last menstrual period was 03/18/2005.  Allergies reviewed: Yes  Medications reviewed: Yes    Medications: Medication refills not needed today.  Pharmacy name entered into [employer]: [id number] [place], [place] - [address] [place] [address]    Clinical concerns:  doctor was notified.      Bharti Moreland MA            "

## 2022-12-12 NOTE — LETTER
12/12/2022         RE: Angeli Jacobson  [id number]  [facility] [id number]        Dear Colleague,    Thank you for referring your patient, Angeli Jacobson, to the [facility] [place]. Please see a copy of my visit note below.    [facility]    Hematology/Oncology New Patient Note      Today's Date: 12/12/22    Reason for Consult:  Right breast cancer.    HISTORY OF PRESENT ILLNESS: Angeli Jacobson is a 72 year old female with history of left thigh liposarcoma status post excision in 2000 with subsequent left leg lymphedema and peripheral arterial disease status post redo left common femoral to popliteal artery bypass in January 2019, who presents with the following oncologic history:  1.  11/7/2022: Screening mammogram showed asymmetry in the right breast at 12:00, retroareolar far posterior.  Left breast negative.  2.  11/15/2022: Diagnostic right mammogram showed asymmetry in the posterior right breast, 8 cm from nipple.  Ultrasound of right breast at 12:00, 2 cm from nipple showed a small benign cyst but no definite sonographic correlate for the mammographic asymmetry.  3.  11/16/2022: Stereotactic guided right breast needle biopsy of 8 mm lesion at 3:00, 8 cm from the nipple showed grade 2 invasive ductal carcinoma, ER positive at %, OH positive at 60-70%, HER2 IHC = 2+ equivocal, HER2/compa FISH negative.    Angeli reports some tenderness at the right breast biopsy site.  Otherwise, no pain elsewhere.  She has some baseline tiredness, per her usual.    REVIEW OF SYSTEMS:   14 point ROS was reviewed and is negative other than as noted above in HPI.       HOME MEDICATIONS:  Current Outpatient Medications   Medication Sig Dispense Refill     acetaminophen (TYLENOL) 325 MG tablet Take 650 mg by mouth every 8 hours       aspirin (ASA) 81 MG chewable tablet Take 81 mg by mouth daily       calcium carbonate 600 mg-vitamin D 400 units (CALTRATE)  600-400 MG-UNIT per tablet Take 1 chew tab by mouth daily       clopidogrel (PLAVIX) 75 MG tablet TAKE 1 TABLET BY MOUTH EVERY DAY 90 tablet 3     econazole nitrate 1 % external cream Apply topically daily (Patient taking differently: Apply topically daily Toes) 85 g 3     evolocumab (REPATHA) 140 MG/ML prefilled autoinjector Inject 1 mL (140 mg) Subcutaneous every 14 days 6 mL 3     ezetimibe (ZETIA) 10 MG tablet Take 1 tablet (10 mg) by mouth daily 90 tablet 3     ibuprofen (ADVIL/MOTRIN) 200 MG tablet Take 400 mg by mouth every 8 hours       levothyroxine (SYNTHROID/LEVOTHROID) 75 MCG tablet Take 1 tablet (75 mcg) by mouth daily 90 tablet 3     multivitamin, therapeutic (THERA-VIT) TABS tablet Take 1 tablet by mouth daily       nitroFURantoin macrocrystal-monohydrate (MACROBID) 100 MG capsule TAKE 1 CAPSULE BY MOUTH AFTER INTRCOURSE 30 capsule 3     rosuvastatin (CRESTOR) 20 MG tablet Take 1 tablet (20 mg) by mouth daily 90 tablet 3     solifenacin (VESICARE) 5 MG tablet Take 1 tablet (5 mg) by mouth daily 90 tablet 3     Vitamin D3 (CHOLECALCIFEROL) 25 mcg (1000 units) tablet Take 2 tablets (50 mcg) by mouth daily 60 tablet 0         ALLERGIES:  Allergies   Allergen Reactions     Celexa [Citalopram Hydrobromide]      Decreased libido         PAST MEDICAL HISTORY:  Past Medical History:   Diagnosis Date     * * * SBE PROPHYLAXIS * * * 1998    Amox 500mg, take 4 tabs one hour prior to procedure.Takes this because of lymphedema secondary from leg surgey     Antiplatelet or antithrombotic long-term use      Arrhythmia      Central serous retinopathy 2001    Resolved 9/2001     CHRONIC NECK PAIN 1995     Depressive disorder      Depressive disorder, not elsewhere classified 2001     Elevated coronary artery calcium score=7/1/21 08/03/2021     Elevated coronary artery calcium score=7/1/21 08/03/2021     History of blood transfusion 04/2019 12/2020    during left hip pinning, during surgery for patella     Lateral  epicondylitis      MIXED HYPERLIPIDEMIA, LDL GOAL <160     LDL goal < 160     Motion sickness      MYXOID LIPOSARCOMA 2000    Left thigh, S/P excision, radiation  at [facility]     Myxoid liposarcoma (HCC) 2004    CHRONIC LEFT THIGH LYMPHEDEMA     Nontoxic multinodular goiter 2005    needs yearly US     Osteoporosis, unspecified      Other chronic pain     fx ribs left      Other lymphedema 2000    left thigh, gets regular PT for this     Overactive bladder      PAD (peripheral artery disease) (H) 2018     PONV (postoperative nausea and vomiting)      SHINGLES      Sprain of lumbosacral (joint) (ligament)     right     Unspecified hearing loss     chronic tinnitus     Unspecified tinnitus      Gynecologic history:  Age of menarche at 11, , age of first pregnancy at 29, 5 years of OCP use, no HRT or fertility treatment.    PAST SURGICAL HISTORY:  Past Surgical History:   Procedure Laterality Date     ARTHROPLASTY HIP Left 10/4/2019    Procedure: Removal of left femoral hardware with a conversion to left total hip arthroplasty using a Biomet Annalisa femoral stem with an OsseoTi acetabular shell and a dual mobility bearing surface;  Surgeon: Spencer Celeste MD;  Location:  OR     BIOPSY  2000     BYPASS GRAFT FEMOROPOPLITEAL Left 2019    Procedure: LEFT FEMORAL TO ABOVE KNEE POPLITEAL  BYPASS WITH POLYTETRAFLUOROETHYLENE GRAFT;  Surgeon: Shade Owens MD;  Location:  OR     COLONOSCOPY N/A 2016    Procedure: COMBINED COLONOSCOPY, SINGLE OR MULTIPLE BIOPSY/POLYPECTOMY BY BIOPSY;  Surgeon: Varun Stanley MD, MD;  Location:  GI     COLONOSCOPY N/A 12/10/2021    Procedure: COLONOSCOPY, WITH POLYPECTOMIES  USING COLD  SNARE,   CLIP APPLIED X2;  Surgeon: Dayton Luna MD;  Location:  GI     CYSTOSCOPY       EXCISION MALIG LESION>1.25CM  2000    Myxoid Liposarcoma       EXPLORE GROIN Right 2018    Procedure: EXPLORE GROIN;  EMERGENCY RIGHT  FEMORAL EXPLORATION WITH FEMORAL ARTERY REPAIR.    EBL: 50mL;  Surgeon: Shade Owens MD;  Location: [facility] OR     HC COLP CERVIX/UPPER VAGINA  07/1997    Negative     HC DILATION/CURETTAGE DIAG/THER NON OB  02/1997    Post menopausal bleeding on HRT, negative     HIP SURGERY Left 04/13/2019     IR ANGIOGRAM THROUGH CATHETER FOLLOW UP  12/20/2018     IR ANGIOGRAM THROUGH CATHETER FOLLOW UP  12/21/2018     IR LOWER EXTREMITY ANGIOGRAM LEFT  12/19/2018     OPEN REDUCTION INTERNAL FIXATION PATELLA Left 12/21/2020    Procedure: Left partial patella fracture excision with advancement of the quadriceps tendon;  Surgeon: Stephen Rhodes MD;  Location: [facility] OR     OPEN REDUCTION INTERNAL FIXATION RODDING INTRAMEDULLARY TIBIA Left 12/21/2020    Procedure: Open reduction intramedullary nailing of left tibia fracture;  Surgeon: Stephen Rhodes MD;  Location: [facility] OR     REMOVE HARDWARE KNEE Left 5/31/2022    Procedure: Left knee patella hardware removal;  Surgeon: Spencer Celeste MD;  Location: [facility] OR     REPAIR TENDON QUADRICEPS Left 7/28/2021    Procedure: Left knee quadricepsplasty with intraoperative patellar fracture requiring open reduction and internal fixation of the patella;  Surgeon: Spencer Celeste MD;  Location: [facility] OR     REPAIR TENDON QUADRICEPS Left 5/31/2022    Procedure: Open left knee VY quadricepsplasty with hardware removal and allograft;  Surgeon: Spencer Celeste MD;  Location: [facility] OR     VASCULAR SURGERY  04/30/2018    Left SFA stent in bypass graft     ZZC APPENDECTOMY      Appendectomy     ZZC NONSPECIFIC PROCEDURE  04/2000    Open Biopsy Left Thigh Liposarcoma     ZZHC COLONOSCOPY THRU STOMA, DIAGNOSTIC  2006    due 2010         SOCIAL HISTORY:  Social History     Socioeconomic History     Marital status:      Spouse name: Chris     Number of children: 3     Years of education: 14     Highest education level: Associate degree: academic program   Occupational  History     Occupation: at home     Employer: NONE    Tobacco Use     Smoking status: Never     Smokeless tobacco: Never   Vaping Use     Vaping Use: Never used   Substance and Sexual Activity     Alcohol use: No     Drug use: No     Sexual activity: Yes     Partners: Male     Birth control/protection: Post-menopausal     Comment:  had a vasectomy   Other Topics Concern     Parent/sibling w/ CABG, MI or angioplasty before 65F 55M? No   Social History Narrative     Not on file     Social Determinants of Health     Financial Resource Strain: Low Risk      Difficulty of Paying Living Expenses: Not hard at all   Food Insecurity: No Food Insecurity     Worried About Running Out of Food in the Last Year: Never true     Ran Out of Food in the Last Year: Never true   Transportation Needs: No Transportation Needs     Lack of Transportation (Medical): No     Lack of Transportation (Non-Medical): No   Physical Activity: Inactive     Days of Exercise per Week: 0 days     Minutes of Exercise per Session: 0 min   Stress: Stress Concern Present     Feeling of Stress : To some extent   Social Connections: Moderately Integrated     Frequency of Communication with Friends and Family: Never     Frequency of Social Gatherings with Friends and Family: Never     Attends Hoahaoism Services: More than 4 times per year     Active Member of Clubs or Organizations: Yes     Attends Club or Organization Meetings: Not on file     Marital Status:    Intimate Partner Violence: Not on file   Housing Stability: Low Risk      Unable to Pay for Housing in the Last Year: No     Number of Places Lived in the Last Year: 1     Unstable Housing in the Last Year: No         FAMILY HISTORY:  Family History   Problem Relation Age of Onset     C.A.D. Father         MI 57     Alcohol/Drug Father         etoh     Coronary Artery Disease Father      Obesity Mother      Osteoporosis Mother      Colon Cancer Brother 70     Hyperlipidemia Son       "Anxiety Disorder Son      Hyperlipidemia Son         very high, experimental drug     C.A.D. Paternal Grandmother         ascvd     Diabetes Maternal Grandmother      Cancer Maternal Grandmother      [place]. Paternal Uncle         Mi  age 48     Cancer Maternal Aunt         pancreatic CA     Hyperlipidemia Son      Hyperlipidemia Cousin      Brother with colon cancer.  Negative for breast, ovarian or prostate cancer.    PHYSICAL EXAM:  Vital signs:  BP (!) 144/82   Pulse 67   Resp 16   Ht 1.676 m (5' 6\")   Wt 69.2 kg (152 lb 9.6 oz)   LMP 2005   SpO2 98%   BMI 24.63 kg/m     ECO  GENERAL/CONSTITUTIONAL: No acute distress.  EYES:  No scleral icterus.  ENT/MOUTH: Neck supple. Mask in place.  LYMPH: No cervical, supraclavicular, axillary adenopathy.   BREAST: Symmetric.  No palpable masses in either breast.  Tiny ecchymosis over the right upper inner breast.  Nipples are everted bilaterally with no discharge.  RESPIRATORY: Clear to auscultation bilaterally. No crackles or wheezing.   CARDIOVASCULAR: Regular rate and rhythm without murmurs, gallops, or rubs.  GASTROINTESTINAL: No hepatosplenomegaly, masses, or tenderness.  No guarding.  No distention.  MUSCULOSKELETAL: Warm and well-perfused, no cyanosis, clubbing; lymphedema of left leg up to upper thigh and compression stocking.  No edema of the right leg.  NEUROLOGIC: No focal motor deficits. Alert, oriented, answers questions appropriately.  INTEGUMENTARY: No rashes or jaundice.  GAIT: Steady, does not use assistive device      LABS:  CBC RESULTS: Recent Labs   Lab Test 22  1735   WBC 5.6   RBC 4.51   HGB 13.5   HCT 42.5   MCV 94   MCH 29.9   MCHC 31.8   RDW 14.4          Recent Labs   Lab Test 22  0732 22  1917 22  1345 22  0859   NA  --   --  139 140   POTASSIUM  --   --  3.9 4.5   CHLORIDE  --   --  106 108   CO2  --   --  29 27   ANIONGAP  --   --  4 5   *  --  107* 84   BUN  --   --  17 15   CR "  --  0.58 0.62 0.68   LONG  --   --  10.0 9.2         PATHOLOGY:  Reviewed as per HPI.    IMAGING:  Reviewed as per HPI.    ASSESSMENT/PLAN:  Angeli Jacobson is a 72 year old female with the following issues:  1.  Clinical prognostic stage IA, [id number], grade 2 invasive ductal carcinoma of the right upper outer breast, ER positive (%), SC positive (70%), HER2/compa FISH negative  -I personally reviewed the breast imaging and biopsy pathology results with Angeli.  She has a very small, clinically favorably prognostic breast cancer.  - She will proceed with lumpectomy on 12/14/2022.  -Discussed possible role of Oncotype DX assay for predictive benefit of chemotherapy as well as additional prognostication if final pathology shows tumor is greater than 0.5 cm.  Discussed that Oncotype DX is not indicated for tumors less than 0.5 cm due to lack of chemotherapy benefit for such small tumors that are ER positive HER2 negative.  -Discussed potential role of adjuvant radiation therapy to reduce risk of local recurrence.  -She would benefit from adjuvant endocrine therapy, preferably with aromatase inhibitor such as anastrozole or, alternately, tamoxifen.  -I discussed the potential side effects of anastrozole, including but not limited to: fatigue, myalgias/arthralgias, bone density loss, decrease sexual drive, vaginal dryness, nausea, headache, difficulty sleeping, hot flashes, night sweats, small cardiovascular risk.  I do note her history of osteoporosis.  --I discussed the potential side effects of tamoxifen, which may include, but not be limited to: muscle aches, mood changes, hot flashes, night sweats, weight gain, fatigue, nausea, earlier cataract formation, and a small risk of blood clots and uterine cancer.  --Will arrange for DEXA scan.  If she has continued osteoporosis, I would recommend concurrent bone health agent such as IV Zometa or Prolia every 6 months concurrently with aromatase inhibitor  "therapy.  --Will obtain CMP today.  She had normal CBC from 12/8/2022 already.    2.  History of myxoid liposarcoma of left thigh  - Status post excision and radiation completed in 2000.  She did not receive chemotherapy for her liposarcoma.  This has resulted in left lower extremity lymphedema.  - Continue lymphedema therapy and compression stockings.    3.  Peripheral arterial disease  -Has history of left lower extremity peripheral arterial disease and is status post femoral-popliteal bypass with later stent to bypass.  She is on aspirin and Plavix.    4. Osteoporosis  --History of taking alendronate, off for past year.  - We will obtain more current DEXA scan.  - Continue adequate calcium and vitamin D.    She will be away on a trip 2/6-2/23/2023.  Discussed that we can work around this schedule for her adjuvant therapies and follow-up.    Will meet back after surgery to discuss adjuvant therapy.    Emilia Angulo MD  Hematology/Oncology  [facility]    Total time spent: 65 minutes in patient evaluation, counseling, documentation, and coordination of care.     Oncology Rooming Note    December 12, 2022 11:03 AM   Angeli Jacobson is a 72 year old female who presents for:    Chief Complaint   Patient presents with     Oncology Clinic Visit     Initial Vitals: LMP 03/18/2005  Estimated body mass index is 25.31 kg/m  as calculated from the following:    Height as of 12/8/22: 1.676 m (5' 6\").    Weight as of 12/8/22: 71.1 kg (156 lb 12.8 oz). There is no height or weight on file to calculate BSA.  Data Unavailable Comment: Data Unavailable   Patient's last menstrual period was 03/18/2005.  Allergies reviewed: Yes  Medications reviewed: Yes    Medications: Medication refills not needed today.  Pharmacy name entered into [facility]: [id number] [place], [place] - [address] [facility]    Clinical concerns:  doctor was notified.      Bharti Moreland MA                Again, thank you for " allowing me to participate in the care of your patient.        Sincerely,        Emilia Angulo MD

## 2022-12-13 ENCOUNTER — HOSPITAL ENCOUNTER (OUTPATIENT)
Dept: MAMMOGRAPHY | Facility: CLINIC | Age: 72
Discharge: HOME | End: 2022-12-13
Attending: SURGERY
Payer: COMMERCIAL

## 2022-12-13 ENCOUNTER — ANESTHESIA EVENT (OUTPATIENT)
Dept: SURGERY | Facility: CLINIC | Age: 72
End: 2022-12-13
Payer: COMMERCIAL

## 2022-12-13 DIAGNOSIS — C50.411 MALIGNANT NEOPLASM OF UPPER-OUTER QUADRANT OF RIGHT BREAST IN FEMALE, ESTROGEN RECEPTOR POSITIVE (H): ICD-10-CM

## 2022-12-13 DIAGNOSIS — Z17.0 MALIGNANT NEOPLASM OF UPPER-OUTER QUADRANT OF RIGHT BREAST IN FEMALE, ESTROGEN RECEPTOR POSITIVE (H): ICD-10-CM

## 2022-12-13 PROCEDURE — 999N000065 MA POST PROCEDURE RIGHT

## 2022-12-13 PROCEDURE — 250N000009 HC RX 250: Performed by: RADIOLOGY

## 2022-12-13 PROCEDURE — A4648 IMPLANTABLE TISSUE MARKER: HCPCS

## 2022-12-13 RX ADMIN — LIDOCAINE HYDROCHLORIDE 5 ML: 10 INJECTION, SOLUTION INFILTRATION; PERINEURAL at 11:09

## 2022-12-13 NOTE — DISCHARGE INSTRUCTIONS
**You may resume your aspirin and plavix tomorrow 12/15**          [facility]  Discharge Instructions: Post-Operative Breast Surgery    ACTIVITY  Take frequent short walks and increase your activity gradually.    Avoid strenuous physical activity or heavy lifting greater than 15-20 lbs. for 1-2 weeks with arm on the surgery side.  You may climb stairs.  Gentle rotation and stretching of your arms and shoulders will prevent joint stiffness.  You may drive without restrictions when you are not using any prescription pain medication and feel comfortable in a car.  You may return to work/school when you are comfortable without any prescription pain medication.    WOUND CARE  You may remove your ACE wrap/dressing and shower 48 hours after the surgery.  Pat your incisions dry and leave them open to air.  Re-apply dressing (Band-Aids or gauze/tape) as needed for drainage.  You may have steri-strips on your incisions.  You may peel off the steri-strips 2 weeks after your surgery if they have not peeled off on their own.   Do not soak your incisions in a tub or pool for 2 weeks.   Do not apply any lotions, creams, or ointments to your incisions.  A ridge under your incisions is normal and will gradually resolve.  Wear a supportive bra for 1-2 weeks, day and night.    DIET  Start with liquids, then gradually resume your regular diet as tolerated.   Drink plenty of liquids to stay hydrated.    PAIN  Expect some tenderness and discomfort at the incision site(s).  Use the prescribed pain medication at your discretion.  Expect gradual resolution of your pain over several days.  You may take ibuprofen with food (unless you have been told not to) or acetaminophen/Tylenol instead of or in addition to your prescribed pain medication.  However, if you are taking Norco do not take any additional acetaminophen/Tylenol.  Do not drink alcohol or drive while you are taking pain  medications.    EXPECTATIONS  Pain medications can cause constipation.  Limit use when possible.  Take an over the counter or prescribed stool softener/stimulant, such as Colace or Senna, 1-2 times a day with plenty of water.  You may take a mild over the counter laxative, such as Miralax or a suppository, as needed.    You may discontinue these medications once you are having regular bowel movements and/or are no longer taking your narcotic pain medication.  Blue dye was used during your surgery to locate lymph nodes and can cause your urine to be blue/green for several days after surgery.  This is not a cause for concern and will resolve on its own.     RETURN APPOINTMENT  Follow up with your surgeon in 2-4 weeks.  Please call the office at [phone] to schedule your appointment.      CALL OUR OFFICE -[phone] IF YOU HAVE:   Chills or fever above 101 F.  Increased redness, warmth, or drainage at your incisions.  Significant bleeding.  Pain not relieved by your pain medication or rest.  Increasing pain after the first 48 hours.  Any other concerns or questions.                          Same Day Surgery Discharge Instructions for  Sedation and General Anesthesia     It's not unusual to feel dizzy, light-headed or faint for up to 24 hours after surgery or while taking pain medication.  If you have these symptoms: sit for a few minutes before standing and have someone assist you when you get up to walk or use the bathroom.    You should rest and relax for the next 24 hours. We recommend you make arrangements to have an adult stay with you for at least 24 hours after your discharge.  Avoid hazardous and strenuous activity.    DO NOT DRIVE any vehicle or operate mechanical equipment for 24 hours following the end of your surgery.  Even though you may feel normal, your reactions may be affected by the medication you have received.    Do not drink alcoholic beverages for 24 hours following surgery.     Slowly  progress to your regular diet as you feel able. It's not unusual to feel nauseated and/or vomit after receiving anesthesia.  If you develop these symptoms, drink clear liquids (apple juice, ginger ale, broth, 7-up, etc. ) until you feel better.  If your nausea and vomiting persists for 24 hours, please notify your surgeon.      All narcotic pain medications, along with inactivity and anesthesia, can cause constipation. Drinking plenty of liquids and increasing fiber intake will help.    For any questions of a medical nature, call your surgeon.    Do not make important decisions for 24 hours.    If you had general anesthesia, you may have a sore throat for a couple of days related to the breathing tube used during surgery.  You may use Cepacol lozenges to help with this discomfort.  If it worsens or if you develop a fever, contact your surgeon.     If you feel your pain is not well managed with the pain medications prescribed by your surgeon, please contact your surgeon's office to let them know so they can address your concerns.         **If you have concerns or questions about your procedure,    please contact Dr Carr at  [phone]**

## 2022-12-14 ENCOUNTER — HOSPITAL ENCOUNTER (OUTPATIENT)
Dept: NUCLEAR MEDICINE | Facility: CLINIC | Age: 72
Setting detail: NUCLEAR MEDICINE
Discharge: HOME | End: 2022-12-14
Attending: SURGERY | Admitting: SURGERY
Payer: COMMERCIAL

## 2022-12-14 ENCOUNTER — HOSPITAL ENCOUNTER (OUTPATIENT)
Facility: CLINIC | Age: 72
Discharge: HOME | End: 2022-12-14
Attending: SURGERY | Admitting: SURGERY
Payer: COMMERCIAL

## 2022-12-14 ENCOUNTER — ANESTHESIA (OUTPATIENT)
Dept: SURGERY | Facility: CLINIC | Age: 72
End: 2022-12-14
Payer: COMMERCIAL

## 2022-12-14 ENCOUNTER — APPOINTMENT (OUTPATIENT)
Dept: SURGERY | Facility: PHYSICIAN GROUP | Age: 72
End: 2022-12-14
Payer: COMMERCIAL

## 2022-12-14 ENCOUNTER — HOSPITAL ENCOUNTER (OUTPATIENT)
Dept: MAMMOGRAPHY | Facility: CLINIC | Age: 72
Discharge: HOME | End: 2022-12-14
Attending: SURGERY
Payer: COMMERCIAL

## 2022-12-14 VITALS
WEIGHT: 152 LBS | OXYGEN SATURATION: 98 % | BODY MASS INDEX: 24.43 KG/M2 | DIASTOLIC BLOOD PRESSURE: 80 MMHG | SYSTOLIC BLOOD PRESSURE: 150 MMHG | HEIGHT: 66 IN | RESPIRATION RATE: 16 BRPM | HEART RATE: 63 BPM | TEMPERATURE: 96.8 F

## 2022-12-14 DIAGNOSIS — C50.411 MALIGNANT NEOPLASM OF UPPER-OUTER QUADRANT OF RIGHT BREAST IN FEMALE, ESTROGEN RECEPTOR POSITIVE (H): ICD-10-CM

## 2022-12-14 DIAGNOSIS — G89.18 POSTOPERATIVE PAIN: Primary | ICD-10-CM

## 2022-12-14 DIAGNOSIS — Z17.0 MALIGNANT NEOPLASM OF UPPER-OUTER QUADRANT OF RIGHT BREAST IN FEMALE, ESTROGEN RECEPTOR POSITIVE (H): ICD-10-CM

## 2022-12-14 PROCEDURE — 250N000009 HC RX 250: Performed by: REGISTERED NURSE

## 2022-12-14 PROCEDURE — 999N000141 HC STATISTIC PRE-PROCEDURE NURSING ASSESSMENT: Performed by: SURGERY

## 2022-12-14 PROCEDURE — 710N000009 HC RECOVERY PHASE 1, LEVEL 1, PER MIN: Performed by: SURGERY

## 2022-12-14 PROCEDURE — 250N000013 HC RX MED GY IP 250 OP 250 PS 637: Performed by: PHYSICIAN ASSISTANT

## 2022-12-14 PROCEDURE — 360N000083 HC SURGERY LEVEL 3 W/ FLUORO, PER MIN: Performed by: SURGERY

## 2022-12-14 PROCEDURE — 258N000003 HC RX IP 258 OP 636: Performed by: REGISTERED NURSE

## 2022-12-14 PROCEDURE — 250N000011 HC RX IP 250 OP 636: Performed by: SURGERY

## 2022-12-14 PROCEDURE — 250N000009 HC RX 250: Performed by: SURGERY

## 2022-12-14 PROCEDURE — 88305 TISSUE EXAM BY PATHOLOGIST: CPT | Mod: TC | Performed by: SURGERY

## 2022-12-14 PROCEDURE — 250N000009 HC RX 250: Performed by: ANESTHESIOLOGY

## 2022-12-14 PROCEDURE — 250N000011 HC RX IP 250 OP 636: Performed by: ANESTHESIOLOGY

## 2022-12-14 PROCEDURE — 38792 RA TRACER ID OF SENTINL NODE: CPT

## 2022-12-14 PROCEDURE — A9520 TC99 TILMANOCEPT DIAG 0.5MCI: HCPCS | Performed by: SURGERY

## 2022-12-14 PROCEDURE — 343N000001 HC RX 343: Performed by: SURGERY

## 2022-12-14 PROCEDURE — 272N000001 HC OR GENERAL SUPPLY STERILE: Performed by: SURGERY

## 2022-12-14 PROCEDURE — 710N000012 HC RECOVERY PHASE 2, PER MINUTE: Performed by: SURGERY

## 2022-12-14 PROCEDURE — 250N000011 HC RX IP 250 OP 636: Performed by: REGISTERED NURSE

## 2022-12-14 PROCEDURE — 370N000017 HC ANESTHESIA TECHNICAL FEE, PER MIN: Performed by: SURGERY

## 2022-12-14 PROCEDURE — 38525 BIOPSY/REMOVAL LYMPH NODES: CPT | Mod: RT | Performed by: SURGERY

## 2022-12-14 PROCEDURE — 999N000104 MA BREAST SPECIMEN RIGHT OR

## 2022-12-14 PROCEDURE — 19301 PARTIAL MASTECTOMY: CPT | Mod: RT | Performed by: SURGERY

## 2022-12-14 RX ORDER — PROPOFOL 10 MG/ML
INJECTION, EMULSION INTRAVENOUS PRN
Status: DISCONTINUED | OUTPATIENT
Start: 2022-12-14 | End: 2022-12-14

## 2022-12-14 RX ORDER — FENTANYL CITRATE 50 UG/ML
INJECTION, SOLUTION INTRAMUSCULAR; INTRAVENOUS PRN
Status: DISCONTINUED | OUTPATIENT
Start: 2022-12-14 | End: 2022-12-14

## 2022-12-14 RX ORDER — FENTANYL CITRATE 0.05 MG/ML
50 INJECTION, SOLUTION INTRAMUSCULAR; INTRAVENOUS EVERY 5 MIN PRN
Status: DISCONTINUED | OUTPATIENT
Start: 2022-12-14 | End: 2022-12-14 | Stop reason: HOSPADM

## 2022-12-14 RX ORDER — MAGNESIUM HYDROXIDE 1200 MG/15ML
LIQUID ORAL PRN
Status: DISCONTINUED | OUTPATIENT
Start: 2022-12-14 | End: 2022-12-14 | Stop reason: HOSPADM

## 2022-12-14 RX ORDER — HYDROCODONE BITARTRATE AND ACETAMINOPHEN 5; 325 MG/1; MG/1
1 TABLET ORAL
Status: COMPLETED | OUTPATIENT
Start: 2022-12-14 | End: 2022-12-14

## 2022-12-14 RX ORDER — CEFAZOLIN SODIUM/WATER 2 G/20 ML
2 SYRINGE (ML) INTRAVENOUS SEE ADMIN INSTRUCTIONS
Status: DISCONTINUED | OUTPATIENT
Start: 2022-12-14 | End: 2022-12-14 | Stop reason: HOSPADM

## 2022-12-14 RX ORDER — LIDOCAINE HYDROCHLORIDE 10 MG/ML
INJECTION, SOLUTION EPIDURAL; INFILTRATION; INTRACAUDAL; PERINEURAL
Status: DISCONTINUED
Start: 2022-12-14 | End: 2022-12-14 | Stop reason: HOSPADM

## 2022-12-14 RX ORDER — BUPIVACAINE HYDROCHLORIDE 2.5 MG/ML
INJECTION, SOLUTION EPIDURAL; INFILTRATION; INTRACAUDAL
Status: DISCONTINUED
Start: 2022-12-14 | End: 2022-12-14 | Stop reason: HOSPADM

## 2022-12-14 RX ORDER — HYDROMORPHONE HCL IN WATER/PF 6 MG/30 ML
0.4 PATIENT CONTROLLED ANALGESIA SYRINGE INTRAVENOUS EVERY 5 MIN PRN
Status: DISCONTINUED | OUTPATIENT
Start: 2022-12-14 | End: 2022-12-14 | Stop reason: HOSPADM

## 2022-12-14 RX ORDER — SCOLOPAMINE TRANSDERMAL SYSTEM 1 MG/1
1 PATCH, EXTENDED RELEASE TRANSDERMAL ONCE
Status: DISCONTINUED | OUTPATIENT
Start: 2022-12-14 | End: 2022-12-14 | Stop reason: HOSPADM

## 2022-12-14 RX ORDER — DEXAMETHASONE SODIUM PHOSPHATE 4 MG/ML
INJECTION, SOLUTION INTRA-ARTICULAR; INTRALESIONAL; INTRAMUSCULAR; INTRAVENOUS; SOFT TISSUE PRN
Status: DISCONTINUED | OUTPATIENT
Start: 2022-12-14 | End: 2022-12-14

## 2022-12-14 RX ORDER — SODIUM CHLORIDE, SODIUM LACTATE, POTASSIUM CHLORIDE, CALCIUM CHLORIDE 600; 310; 30; 20 MG/100ML; MG/100ML; MG/100ML; MG/100ML
INJECTION, SOLUTION INTRAVENOUS CONTINUOUS
Status: DISCONTINUED | OUTPATIENT
Start: 2022-12-14 | End: 2022-12-14 | Stop reason: HOSPADM

## 2022-12-14 RX ORDER — WATER 10 ML/10ML
INJECTION INTRAMUSCULAR; INTRAVENOUS; SUBCUTANEOUS
Status: DISCONTINUED
Start: 2022-12-14 | End: 2022-12-14 | Stop reason: HOSPADM

## 2022-12-14 RX ORDER — ONDANSETRON 2 MG/ML
4 INJECTION INTRAMUSCULAR; INTRAVENOUS EVERY 30 MIN PRN
Status: DISCONTINUED | OUTPATIENT
Start: 2022-12-14 | End: 2022-12-14 | Stop reason: HOSPADM

## 2022-12-14 RX ORDER — HYDROCODONE BITARTRATE AND ACETAMINOPHEN 5; 325 MG/1; MG/1
1 TABLET ORAL EVERY 4 HOURS PRN
Qty: 5 TABLET | Refills: 0 | Status: SHIPPED | OUTPATIENT
Start: 2022-12-14 | End: 2023-01-05

## 2022-12-14 RX ORDER — FENTANYL CITRATE 0.05 MG/ML
25 INJECTION, SOLUTION INTRAMUSCULAR; INTRAVENOUS EVERY 5 MIN PRN
Status: DISCONTINUED | OUTPATIENT
Start: 2022-12-14 | End: 2022-12-14 | Stop reason: HOSPADM

## 2022-12-14 RX ORDER — LIDOCAINE HYDROCHLORIDE 20 MG/ML
INJECTION, SOLUTION INFILTRATION; PERINEURAL PRN
Status: DISCONTINUED | OUTPATIENT
Start: 2022-12-14 | End: 2022-12-14

## 2022-12-14 RX ORDER — ONDANSETRON 2 MG/ML
INJECTION INTRAMUSCULAR; INTRAVENOUS PRN
Status: DISCONTINUED | OUTPATIENT
Start: 2022-12-14 | End: 2022-12-14

## 2022-12-14 RX ORDER — ONDANSETRON 4 MG/1
4 TABLET, ORALLY DISINTEGRATING ORAL EVERY 30 MIN PRN
Status: DISCONTINUED | OUTPATIENT
Start: 2022-12-14 | End: 2022-12-14 | Stop reason: HOSPADM

## 2022-12-14 RX ORDER — CEFAZOLIN SODIUM/WATER 2 G/20 ML
2 SYRINGE (ML) INTRAVENOUS
Status: COMPLETED | OUTPATIENT
Start: 2022-12-14 | End: 2022-12-14

## 2022-12-14 RX ORDER — SODIUM CHLORIDE, SODIUM LACTATE, POTASSIUM CHLORIDE, CALCIUM CHLORIDE 600; 310; 30; 20 MG/100ML; MG/100ML; MG/100ML; MG/100ML
INJECTION, SOLUTION INTRAVENOUS CONTINUOUS PRN
Status: DISCONTINUED | OUTPATIENT
Start: 2022-12-14 | End: 2022-12-14

## 2022-12-14 RX ORDER — HYDROMORPHONE HCL IN WATER/PF 6 MG/30 ML
0.2 PATIENT CONTROLLED ANALGESIA SYRINGE INTRAVENOUS EVERY 5 MIN PRN
Status: DISCONTINUED | OUTPATIENT
Start: 2022-12-14 | End: 2022-12-14 | Stop reason: HOSPADM

## 2022-12-14 RX ORDER — PROPOFOL 10 MG/ML
INJECTION, EMULSION INTRAVENOUS CONTINUOUS PRN
Status: DISCONTINUED | OUTPATIENT
Start: 2022-12-14 | End: 2022-12-14

## 2022-12-14 RX ORDER — AMOXICILLIN 250 MG
1-2 CAPSULE ORAL 2 TIMES DAILY
Qty: 15 TABLET | Refills: 0 | Status: SHIPPED | OUTPATIENT
Start: 2022-12-14 | End: 2023-04-04

## 2022-12-14 RX ADMIN — FENTANYL CITRATE 25 MCG: 50 INJECTION, SOLUTION INTRAMUSCULAR; INTRAVENOUS at 08:57

## 2022-12-14 RX ADMIN — FENTANYL CITRATE 25 MCG: 50 INJECTION, SOLUTION INTRAMUSCULAR; INTRAVENOUS at 08:49

## 2022-12-14 RX ADMIN — HYDROCODONE BITARTRATE AND ACETAMINOPHEN 1 TABLET: 5; 325 TABLET ORAL at 11:14

## 2022-12-14 RX ADMIN — FENTANYL CITRATE 25 MCG: 50 INJECTION, SOLUTION INTRAMUSCULAR; INTRAVENOUS at 10:43

## 2022-12-14 RX ADMIN — PHENYLEPHRINE HYDROCHLORIDE 100 MCG: 10 INJECTION INTRAVENOUS at 08:58

## 2022-12-14 RX ADMIN — FENTANYL CITRATE 25 MCG: 50 INJECTION, SOLUTION INTRAMUSCULAR; INTRAVENOUS at 09:23

## 2022-12-14 RX ADMIN — SCOPALAMINE 1 PATCH: 1 PATCH, EXTENDED RELEASE TRANSDERMAL at 08:06

## 2022-12-14 RX ADMIN — LIDOCAINE HYDROCHLORIDE 100 MG: 20 INJECTION, SOLUTION INFILTRATION; PERINEURAL at 08:50

## 2022-12-14 RX ADMIN — SODIUM CHLORIDE, POTASSIUM CHLORIDE, SODIUM LACTATE AND CALCIUM CHLORIDE: 600; 310; 30; 20 INJECTION, SOLUTION INTRAVENOUS at 10:08

## 2022-12-14 RX ADMIN — ONDANSETRON 4 MG: 2 INJECTION INTRAMUSCULAR; INTRAVENOUS at 08:50

## 2022-12-14 RX ADMIN — DEXAMETHASONE SODIUM PHOSPHATE 4 MG: 4 INJECTION, SOLUTION INTRA-ARTICULAR; INTRALESIONAL; INTRAMUSCULAR; INTRAVENOUS; SOFT TISSUE at 08:50

## 2022-12-14 RX ADMIN — HYDROMORPHONE HYDROCHLORIDE 0.2 MG: 0.2 INJECTION, SOLUTION INTRAMUSCULAR; INTRAVENOUS; SUBCUTANEOUS at 11:03

## 2022-12-14 RX ADMIN — SODIUM CHLORIDE, POTASSIUM CHLORIDE, SODIUM LACTATE AND CALCIUM CHLORIDE: 600; 310; 30; 20 INJECTION, SOLUTION INTRAVENOUS at 08:41

## 2022-12-14 RX ADMIN — Medication 2 G: at 08:48

## 2022-12-14 RX ADMIN — FENTANYL CITRATE 25 MCG: 50 INJECTION, SOLUTION INTRAMUSCULAR; INTRAVENOUS at 10:33

## 2022-12-14 RX ADMIN — PROPOFOL 200 MG: 10 INJECTION, EMULSION INTRAVENOUS at 08:50

## 2022-12-14 RX ADMIN — TILMANOCEPT 0.55 MILLICURIE: KIT at 07:55

## 2022-12-14 RX ADMIN — PROPOFOL 150 MCG/KG/MIN: 10 INJECTION, EMULSION INTRAVENOUS at 08:50

## 2022-12-14 RX ADMIN — FENTANYL CITRATE 25 MCG: 50 INJECTION, SOLUTION INTRAMUSCULAR; INTRAVENOUS at 09:32

## 2022-12-14 ASSESSMENT — LIFESTYLE VARIABLES: TOBACCO_USE: 0

## 2022-12-14 ASSESSMENT — COPD QUESTIONNAIRES: COPD: 0

## 2022-12-14 ASSESSMENT — ACTIVITIES OF DAILY LIVING (ADL)
ADLS_ACUITY_SCORE: 35
ADLS_ACUITY_SCORE: 33
ADLS_ACUITY_SCORE: 35

## 2022-12-14 NOTE — ANESTHESIA POSTPROCEDURE EVALUATION
Patient: Angeli Jacobson    Procedure: Procedure(s):  right breast tag localized lumpectomy  with right sentinel lymph node biopsy       Anesthesia Type:  General    Note:  Disposition: Outpatient   Postop Pain Control: Uneventful            Sign Out: Well controlled pain   PONV:    Neuro/Psych: Uneventful            Sign Out: Acceptable/Baseline neuro status   Airway/Respiratory: Uneventful            Sign Out: Acceptable/Baseline resp. status   CV/Hemodynamics: Uneventful            Sign Out: Acceptable CV status   Other NRE: NONE   DID A NON-ROUTINE EVENT OCCUR? No           Last vitals:  Vitals Value Taken Time   /89 12/14/22 1115   Temp 36  C (96.8  F) 12/14/22 1015   Pulse 66 12/14/22 1121   Resp 18 12/14/22 1121   SpO2 96 % 12/14/22 1121   Vitals shown include unvalidated device data.    Electronically Signed By: Frank Carranza MD  December 14, 2022  12:33 PM

## 2022-12-14 NOTE — ANESTHESIA PROCEDURE NOTES
Airway       Patient location during procedure: OR       Procedure Start/Stop Times: 12/14/2022 8:53 AM  Staff -        Anesthesiologist:  Frank Carranza MD       CRNA: Terry Page APRN CRNA       Performed By: CRNA  Consent for Airway        Urgency: elective  Indications and Patient Condition       Indications for airway management: demetrius-procedural       Induction type:intravenous       Mask difficulty assessment: 0 - not attempted    Final Airway Details       Final airway type: supraglottic airway    Supraglottic Airway Details        Type: LMA       Brand: Ambu AuraGain       LMA size: 4       Cuff Pressure (cm H2O): 25    Post intubation assessment        Placement verified by: capnometry, equal breath sounds and chest rise        Number of attempts at approach: 1       Number of other approaches attempted: 0       Secured with: pink tape       Ease of procedure: easy       Dentition: Intact and Unchanged    Medication(s) Administered   Medication Administration Time: 12/14/2022 8:53 AM

## 2022-12-14 NOTE — OP NOTE
[facility] Breast Surgery Operative Note      Pre-operative diagnosis: Right breast invasive ductal carcinoma   Post-operative diagnosis: Same, pathology pending     Procedure: 1.  RIGHT RFID TAG LOCALIZED PARTIAL MASTECTOMY  2.  RIGHT SENTINEL LYMPH NODE BIOPSY  3. INJECTION OF METHYLENE BLUE DYE FOR LYMPHATIC MAPPING       Surgeon: Shelly Carr MD   Assistant(s):  Rosalina Foote PA-C  The PA s assistance was medically necessary to provide adequate exposure in the operating field, maintain hemostasis, cutting suture, clamping and ligating bleeding vessels, and visualization of anatomic structures throughout the surgical procedure.      Anesthesia: General    Estimated blood loss:   10 cc     Specimens: ID Type Source Tests Collected by Time Destination   1 : RIGHT BREAST LOCALIZED TAG LUMPECTOMY Tissue Breast, Right SURGICAL PATHOLOGY EXAM Shelly Carr MD 12/14/2022  9:17 AM    2 : RIGHT BREAST NEW INFERIOR MARGIN, INK AT NEW MARGIN Tissue Breast, Right SURGICAL PATHOLOGY EXAM Shelly Carr MD 12/14/2022  9:31 AM    3 : RIGHT BREAST NEW MEDIAL MARGIN, INK AT NEW MARGIN Tissue Breast, Right SURGICAL PATHOLOGY EXAM Shelly Carr MD 12/14/2022  9:32 AM    4 : RIGHT BREAST NEW POSTERIOR MARGIN, INK AT NEW MARGIN Tissue Breast, Right SURGICAL PATHOLOGY EXAM Shelly Carr MD 12/14/2022  9:33 AM    5 : RIGHT AXILLARY SENTINEL LYMPH NODE #1 Tissue Lymph Node(s), Axillary, Right SURGICAL PATHOLOGY EXAM Shelly Carr MD 12/14/2022  9:40 AM    6 : RIGHT AXILLARY SENTINEL LYMPH NODE #2 Tissue Lymph Node(s), Axillary, Right SURGICAL PATHOLOGY EXAM Shelly Carr MD 12/14/2022  9:41 AM         INDICATION: Angeli is a 72yof who presented with a right breast invasive ductal carcinoma, grade 2, ER 90%, TX 60-70%, HER 2 negative at 3:00, 8cm FN measuring 8mm in size. She elected to proceed with breast conservation.   DESCRIPTION OF PROCEDURE: The patient was placed on the table in supine position.  General anesthetic was induced. Perioperative antibiotics were given.  The right breast and axilla were prepped and draped in standard sterile fashion. I then injected methylene blue dye in a subdermal location retroareolar to assist in lymphatic mapping for the sentinel lymph node biopsy.   We used the radiofrequency localizer tag placed in the [facility] as well as the post-tag mammograms to localize the area of interest. Local anesthetic was injected along the marked line for the incision. We made a transverse incision centered at the 1-2 o'clock position on the upper breast. We created skin flaps along the anterior mammary fascial plane circumferentially using cautery. A suture was placed over the highest signal with the probe to guide circumferential dissection. We then carried the dissection down using electrocautery into the breast tissue and excised the area of interest, including the tag.  The localizer probe was used to guide the dissection. The area of concerning breast tissue was removed in its entirety with some surrounding benign appearing breast tissue. The posterior margin was including the pectoral fascia.  The tumor was noted at this margin. I then excised a new posterior margin which was pectoralis muscle at the site of the tumor. After the specimen was removed it had a high signal with the localizer probe.  Once the mass was removed, it was oriented with Andre dyes. A specimen mammogram was obtained and revealed the RFID tag and the mass. The specimen was then sent to pathology for review.  I did excise new inferior and medial margins as well. The wound was then examined for bleeding and hemostasis was achieved using electrocautery.      Clips were placed at the 12, 3, 6, and 9 o'clock positions of the lumpectomy cavity as well as posterior.  The lumpectomy cavity was reapproximated with several interrupted 3-0 vicryl sutures. The skin was closed with a deep dermal 3-0 vicryl and running  4-0 Monocryl subcuticular suture and steri strips.    We than began the sentinel lymph node biopsy. The patient had been injected with a radionuclear pharmaceutical preoperatively.  I had injected methylene blue dye as above. We used the Neoprobe to localize an area of increased activity in the axilla. We made an incision in the skin overlying that area of activity. The incision was carried down into the subcutaneous tissue and into the axillary space with the Bovie electrocautery. We then localized an area of increased activity, and isolated a lymph node from surrounding tissues. The lymph node had a count of >90 and was blue.  This was passed off the field as right axillary sentinel lymph node #1. There was an additional small node in close proximity which had counts of 20 which was also excised and sent to pathology.     The remainder of the axilla had no foci of increased radioactivity. There were no clinically positive nodes upon thorough evaluation by palpation of the axilla.   Hemostasis was assured.  We then closed the incision with interrupted subcutaneous 3-0 Vicryl sutures, a running 4-0 Monocryl subcuticular suture and Steri strips. The nodes were sent to pathology for routine evaluation. The patient tolerated the procedure well.  Sponge and instrument counts were correct.    Shelly Carr MD  [facility]  [phone]

## 2022-12-14 NOTE — ANESTHESIA CARE TRANSFER NOTE
Patient: Angeli Jacobson    Procedure: Procedure(s):  right breast tag localized lumpectomy  with right sentinel lymph node biopsy       Diagnosis: Malignant neoplasm of upper-outer quadrant of right breast in female, estrogen receptor positive (H) [C50.411, Z17.0]  Diagnosis Additional Information: No value filed.    Anesthesia Type:   General     Note:    Oropharynx: oropharynx clear of all foreign objects and spontaneously breathing  Level of Consciousness: drowsy  Oxygen Supplementation: face mask  Level of Supplemental Oxygen (L/min / FiO2): 6  Independent Airway: airway patency satisfactory and stable  Dentition: dentition unchanged  Vital Signs Stable: post-procedure vital signs reviewed and stable  Report to RN Given: handoff report given  Patient transferred to: Phase II    Handoff Report: Identifed the Patient, Identified the Reponsible Provider, Reviewed the pertinent medical history, Discussed the surgical course, Reviewed Intra-OP anesthesia mangement and issues during anesthesia, Set expectations for post-procedure period and Allowed opportunity for questions and acknowledgement of understanding      Vitals:  Vitals Value Taken Time   /87 1017   Temp 98    Pulse 67    Resp 14    SpO2 98        Electronically Signed By: ELVA Guan CRNA  December 14, 2022  10:18 AM

## 2022-12-14 NOTE — ANESTHESIA PREPROCEDURE EVALUATION
Anesthesia Pre-Procedure Evaluation    Patient: Angeli Jacobson   MRN: [id number] : 1950        Procedure : Procedure(s):  right breast tag localized lumpectomy  with right sentinel lymph node biopsy          Past Medical History:   Diagnosis Date     * * * SBE PROPHYLAXIS * * *     Amox 500mg, take 4 tabs one hour prior to procedure.Takes this because of lymphedema secondary from leg surgey     Antiplatelet or antithrombotic long-term use      Arrhythmia      Central serous retinopathy     Resolved 2001     CHRONIC NECK PAIN      Depressive disorder      Depressive disorder, not elsewhere classified      Elevated coronary artery calcium score=2021     Elevated coronary artery calcium score=2021     History of blood transfusion 2019    during left hip pinning, during surgery for patella     Lateral epicondylitis      MIXED HYPERLIPIDEMIA, LDL GOAL <160     LDL goal < 160     Motion sickness      MYXOID LIPOSARCOMA 2000    Left thigh, S/P excision, radiation  at [facility]     Myxoid liposarcoma (HCC) 2004    CHRONIC LEFT THIGH LYMPHEDEMA     Nontoxic multinodular goiter 2005    needs yearly US     Osteoporosis, unspecified      Other chronic pain     fx ribs left      Other lymphedema 2000    left thigh, gets regular PT for this     Overactive bladder      PAD (peripheral artery disease) (H) 2018     PONV (postoperative nausea and vomiting)      SHINGLES      Sprain of lumbosacral (joint) (ligament)     right     Unspecified hearing loss     chronic tinnitus     Unspecified tinnitus       Past Surgical History:   Procedure Laterality Date     ARTHROPLASTY HIP Left 10/4/2019    Procedure: Removal of left femoral hardware with a conversion to left total hip arthroplasty using a Biomet Annalisa femoral stem with an OsseoTi acetabular shell and a dual mobility bearing surface;  Surgeon: Spencer Celeste MD;   Location: [facility] OR     BIOPSY  April 2000     BYPASS GRAFT FEMOROPOPLITEAL Left 1/21/2019    Procedure: LEFT FEMORAL TO ABOVE KNEE POPLITEAL  BYPASS WITH POLYTETRAFLUOROETHYLENE GRAFT;  Surgeon: Shade Owens MD;  Location:  OR     COLONOSCOPY N/A 2/5/2016    Procedure: COMBINED COLONOSCOPY, SINGLE OR MULTIPLE BIOPSY/POLYPECTOMY BY BIOPSY;  Surgeon: Varun Stanley MD, MD;  Location:  GI     COLONOSCOPY N/A 12/10/2021    Procedure: COLONOSCOPY, WITH POLYPECTOMIES  USING COLD  SNARE,   CLIP APPLIED X2;  Surgeon: Dayton Luna MD;  Location:  GI     CYSTOSCOPY       EXCISION MALIG LESION>1.25CM  5/2000    Myxoid Liposarcoma       EXPLORE GROIN Right 5/1/2018    Procedure: EXPLORE GROIN;  EMERGENCY RIGHT FEMORAL EXPLORATION WITH FEMORAL ARTERY REPAIR.    EBL: 50mL;  Surgeon: Shade Owens MD;  Location:  OR     HC COLP CERVIX/UPPER VAGINA  07/1997    Negative     HC DILATION/CURETTAGE DIAG/THER NON OB  02/1997    Post menopausal bleeding on HRT, negative     HIP SURGERY Left 04/13/2019     IR ANGIOGRAM THROUGH CATHETER FOLLOW UP  12/20/2018     IR ANGIOGRAM THROUGH CATHETER FOLLOW UP  12/21/2018     IR LOWER EXTREMITY ANGIOGRAM LEFT  12/19/2018     OPEN REDUCTION INTERNAL FIXATION PATELLA Left 12/21/2020    Procedure: Left partial patella fracture excision with advancement of the quadriceps tendon;  Surgeon: Stephen Rhodes MD;  Location:  OR     OPEN REDUCTION INTERNAL FIXATION RODDING INTRAMEDULLARY TIBIA Left 12/21/2020    Procedure: Open reduction intramedullary nailing of left tibia fracture;  Surgeon: Stephen Rhodes MD;  Location: [facility] OR     REMOVE HARDWARE KNEE Left 5/31/2022    Procedure: Left knee patella hardware removal;  Surgeon: Spencer Celeste MD;  Location:  OR     REPAIR TENDON QUADRICEPS Left 7/28/2021    Procedure: Left knee quadricepsplasty with intraoperative patellar fracture requiring open reduction and internal fixation of the patella;  Surgeon: Roula  Spencer Null MD;  Location: [facility] OR     REPAIR TENDON QUADRICEPS Left 5/31/2022    Procedure: Open left knee VY quadricepsplasty with hardware removal and allograft;  Surgeon: Spencer Celeste MD;  Location: [facility] OR     VASCULAR SURGERY  04/30/2018    Left SFA stent in bypass graft     [facility] APPENDECTOMY      Appendectomy     [facility] NONSPECIFIC PROCEDURE  04/2000    Open Biopsy Left Thigh Liposarcoma     [facility], [facility]AGNOSTIC  2006    due 2010      Allergies   Allergen Reactions     Celexa [Citalopram Hydrobromide]      Decreased libido      Social History     Tobacco Use     Smoking status: Never     Smokeless tobacco: Never   Substance Use Topics     Alcohol use: Never      Wt Readings from Last 1 Encounters:   12/14/22 68.9 kg (152 lb)        Anesthesia Evaluation   Pt has had prior anesthetic.     History of anesthetic complications  - PONV.      ROS/MED HX  ENT/Pulmonary:    (-) tobacco use, asthma and COPD   Neurologic:  - neg neurologic ROS     Cardiovascular:     (+) Dyslipidemia hypertension-Peripheral Vascular Disease-CAD ---    METS/Exercise Tolerance:     Hematologic:       Musculoskeletal:       GI/Hepatic:       Renal/Genitourinary:       Endo:     (+) thyroid problem, hypothyroidism,     Psychiatric/Substance Use:     (+) psychiatric history depression     Infectious Disease:       Malignancy:   (+) Malignancy, History of Breast and Other.Other CA Liposarcoma Remission status post.    Other:      (+) , H/O Chronic Pain,        Physical Exam    Airway        Mallampati: II   TM distance: > 3 FB   Neck ROM: full   Mouth opening: > 3 cm    Respiratory Devices and Support         Dental  no notable dental history         Cardiovascular   cardiovascular exam normal       Rhythm and rate: regular     Pulmonary   pulmonary exam normal        breath sounds clear to auscultation           OUTSIDE LABS:  CBC:   Lab Results   Component Value Date    WBC 5.6 12/08/2022    WBC 8.1 07/27/2022     HGB 13.5 12/08/2022    HGB 13.2 07/27/2022    HCT 42.5 12/08/2022    HCT 41.0 07/27/2022     12/08/2022     07/27/2022     BMP:   Lab Results   Component Value Date     12/08/2022     05/26/2022    POTASSIUM 4.6 12/08/2022    POTASSIUM 3.9 05/26/2022    CHLORIDE 104 12/08/2022    CHLORIDE 106 05/26/2022    CO2 21 (L) 12/08/2022    CO2 29 05/26/2022    BUN 20.3 12/08/2022    BUN 17 05/26/2022    CR 0.77 12/08/2022    CR 0.58 05/31/2022    GLC 82 12/08/2022     (H) 06/01/2022     COAGS:   Lab Results   Component Value Date    PTT 27 12/19/2018    INR 1.01 09/27/2019     POC:   Lab Results   Component Value Date     (H) 01/23/2019     HEPATIC:   Lab Results   Component Value Date    ALBUMIN 4.3 12/08/2022    PROTTOTAL 7.0 12/08/2022    ALT 20 12/08/2022    AST 27 12/08/2022    ALKPHOS 84 12/08/2022    BILITOTAL 0.3 12/08/2022     OTHER:   Lab Results   Component Value Date    A1C 5.5 01/17/2019    LONG 10.2 12/08/2022    MAG 1.8 05/01/2018    TSH 1.18 12/08/2022    T4 0.92 05/17/2021    CRP 17.0 (H) 09/28/2021    SED 53 (H) 09/28/2021       Anesthesia Plan    ASA Status:  3   NPO Status:  NPO Appropriate    Anesthesia Type: General.     - Airway: LMA   Induction: Intravenous, Propofol.   Maintenance: TIVA.        Consents    Anesthesia Plan(s) and associated risks, benefits, and realistic alternatives discussed. Questions answered and patient/representative(s) expressed understanding.    - Discussed:     - Discussed with:  Patient      - Extended Intubation/Ventilatory Support Discussed: No.      - Patient is DNR/DNI Status: No    Use of blood products discussed: No .     Postoperative Care    Pain management: Multi-modal analgesia.   PONV prophylaxis: Ondansetron (or other 5HT-3), Dexamethasone or Solumedrol, Background Propofol Infusion     Comments:    Other Comments: Patient is counseled on the anesthesia plan and relevant anesthesia procedures including all risks and  benefits. All patient questions were answered.             Frank Carranza MD

## 2022-12-14 NOTE — INTERVAL H&P NOTE
I have reviewed the surgical (or preoperative) H&P that is linked to this encounter, and examined the patient. There are no significant changes    Clinical Conditions Present on Arrival:  Clinically Significant Risk Factors Present on Admission            # Hypercalcemia: Highest Ca = 10.2 mg/dL in last 30 days, will monitor as appropriate        # Drug Induced Platelet Defect: home medication list includes an antiplatelet medication

## 2022-12-21 ENCOUNTER — TELEPHONE (OUTPATIENT)
Dept: SURGERY | Facility: CLINIC | Age: 72
End: 2022-12-21

## 2022-12-21 LAB
PATH REPORT.COMMENTS IMP SPEC: ABNORMAL
PATH REPORT.COMMENTS IMP SPEC: YES
PATH REPORT.FINAL DX SPEC: ABNORMAL
PATH REPORT.GROSS SPEC: ABNORMAL
PATH REPORT.MICROSCOPIC SPEC OTHER STN: ABNORMAL
PATH REPORT.RELEVANT HX SPEC: ABNORMAL
PATHOLOGY SYNOPTIC REPORT: ABNORMAL
PHOTO IMAGE: ABNORMAL

## 2022-12-21 PROCEDURE — 88307 TISSUE EXAM BY PATHOLOGIST: CPT | Mod: 26 | Performed by: PATHOLOGY

## 2022-12-21 PROCEDURE — 88305 TISSUE EXAM BY PATHOLOGIST: CPT | Mod: 26 | Performed by: PATHOLOGY

## 2022-12-21 PROCEDURE — 88341 IMHCHEM/IMCYTCHM EA ADD ANTB: CPT | Mod: 26 | Performed by: PATHOLOGY

## 2022-12-21 PROCEDURE — 88342 IMHCHEM/IMCYTCHM 1ST ANTB: CPT | Mod: 26 | Performed by: PATHOLOGY

## 2022-12-21 NOTE — CONFIDENTIAL NOTE
I called Angeli and discussed her pathology report. As the invasive component is considered <5mm, no oncotype as per Dr. Angulo's note. I will see her back in a couple weeks to further review in person and we can discuss role of radiation at that visit.     Shelly Carr MD  [facility]  [phone]

## 2022-12-22 ENCOUNTER — ANCILLARY PROCEDURE (OUTPATIENT)
Dept: BONE DENSITY | Facility: CLINIC | Age: 72
End: 2022-12-22
Attending: INTERNAL MEDICINE
Payer: COMMERCIAL

## 2022-12-22 ENCOUNTER — ANCILLARY PROCEDURE (OUTPATIENT)
Dept: BONE DENSITY | Facility: CLINIC | Age: 72
End: 2022-12-22
Payer: COMMERCIAL

## 2022-12-22 DIAGNOSIS — Z78.0 ASYMPTOMATIC MENOPAUSAL STATE: ICD-10-CM

## 2022-12-22 DIAGNOSIS — M81.0 AGE-RELATED OSTEOPOROSIS WITHOUT CURRENT PATHOLOGICAL FRACTURE: ICD-10-CM

## 2022-12-22 PROCEDURE — 77085 DXA BONE DENSITY AXL VRT FX: CPT | Performed by: INTERNAL MEDICINE

## 2022-12-22 PROCEDURE — 77081 DXA BONE DENSITY APPENDICULR: CPT | Mod: 59 | Performed by: INTERNAL MEDICINE

## 2022-12-27 NOTE — PROGRESS NOTES
[facility]    Hematology/Oncology Established Patient Note      Today's Date: 1/5/2023    Reason for follow-up:  Right breast cancer.    HISTORY OF PRESENT ILLNESS: Angeli Jacobson is a 72 year old female with history of left thigh liposarcoma status post excision in 2000 with subsequent left leg lymphedema and peripheral arterial disease status post redo left common femoral to popliteal artery bypass in January 2019, who presents with the following oncologic history:  1.  11/7/2022: Screening mammogram showed asymmetry in the right breast at 12:00, retroareolar far posterior.  Left breast negative.  2.  11/15/2022: Diagnostic right mammogram showed asymmetry in the posterior right breast, 8 cm from nipple.  Ultrasound of right breast at 12:00, 2 cm from nipple showed a small benign cyst but no definite sonographic correlate for the mammographic asymmetry.  3.  11/16/2022: Stereotactic guided right breast needle biopsy of 8 mm lesion at 3:00, 8 cm from the nipple showed grade 2 invasive ductal carcinoma, ER positive at %, IA positive at 60-70%, HER2 IHC = 2+ equivocal, HER2/compa FISH negative.  4. 12/14/2022: Underwent right breast lumpectomy with right axillary sentinel lymph node excision with Dr. Shelly Carr.  Pathology showed no residual invasive malignancy; 2 microscopic foci of DCIS, grade 2, largest measuring 1.2 mm; margins negative; total 2 right axillary lymph nodes negative.      INTERVAL HISTORY:  Angeli reports healing well from her surgery.      REVIEW OF SYSTEMS:   14 point ROS was reviewed and is negative other than as noted above in HPI.       HOME MEDICATIONS:  Current Outpatient Medications   Medication Sig Dispense Refill     aspirin (ASA) 81 MG chewable tablet Take 81 mg by mouth daily       calcium carbonate 600 mg-vitamin D 400 units (CALTRATE) 600-400 MG-UNIT per tablet Take 1 chew tab by mouth daily       clopidogrel (PLAVIX) 75 MG tablet TAKE 1 TABLET BY  MOUTH EVERY DAY 90 tablet 3     econazole nitrate 1 % external cream Apply topically daily (Patient taking differently: Apply topically daily Toes) 85 g 3     evolocumab (REPATHA) 140 MG/ML prefilled autoinjector Inject 1 mL (140 mg) Subcutaneous every 14 days 6 mL 3     ezetimibe (ZETIA) 10 MG tablet Take 1 tablet (10 mg) by mouth daily 90 tablet 3     HYDROcodone-acetaminophen (NORCO) 5-325 MG tablet Take 1 tablet by mouth every 4 hours as needed for moderate to severe pain 5 tablet 0     levothyroxine (SYNTHROID/LEVOTHROID) 75 MCG tablet Take 1 tablet (75 mcg) by mouth daily 90 tablet 3     multivitamin, therapeutic (THERA-VIT) TABS tablet Take 1 tablet by mouth daily       nitroFURantoin macrocrystal-monohydrate (MACROBID) 100 MG capsule TAKE 1 CAPSULE BY MOUTH AFTER INTRCOURSE 30 capsule 3     rosuvastatin (CRESTOR) 20 MG tablet Take 1 tablet (20 mg) by mouth daily 90 tablet 3     senna-docusate (SENOKOT-S/PERICOLACE) 8.6-50 MG tablet Take 1-2 tablets by mouth 2 times daily 15 tablet 0     solifenacin (VESICARE) 5 MG tablet Take 1 tablet (5 mg) by mouth daily 90 tablet 3     Vitamin D3 (CHOLECALCIFEROL) 25 mcg (1000 units) tablet Take 2 tablets (50 mcg) by mouth daily 60 tablet 0         ALLERGIES:  Allergies   Allergen Reactions     Celexa [Citalopram Hydrobromide]      Decreased libido         PAST MEDICAL HISTORY:  Past Medical History:   Diagnosis Date     * * * SBE PROPHYLAXIS * * * 1998    Amox 500mg, take 4 tabs one hour prior to procedure.Takes this because of lymphedema secondary from leg surgey     Antiplatelet or antithrombotic long-term use      Arrhythmia      Central serous retinopathy 2001    Resolved 9/2001     CHRONIC NECK PAIN 1995     Depressive disorder      Depressive disorder, not elsewhere classified 2001     Elevated coronary artery calcium score=7/1/21 08/03/2021     Elevated coronary artery calcium score=7/1/21 08/03/2021     History of blood transfusion 04/2019 12/2020    during  left hip pinning, during surgery for patella     Lateral epicondylitis      MIXED HYPERLIPIDEMIA, LDL GOAL <160     LDL goal < 160     Motion sickness      MYXOID LIPOSARCOMA 2000    Left thigh, S/P excision, radiation  at [facility]     Myxoid liposarcoma (HCC) 2004    CHRONIC LEFT THIGH LYMPHEDEMA     Nontoxic multinodular goiter 2005    needs yearly US     Osteoporosis, unspecified      Other chronic pain     fx ribs left      Other lymphedema 2000    left thigh, gets regular PT for this     Overactive bladder      PAD (peripheral artery disease) (H) 2018     PONV (postoperative nausea and vomiting)      SHINGLES      Sprain of lumbosacral (joint) (ligament)     right     Unspecified hearing loss     chronic tinnitus     Unspecified tinnitus      Gynecologic history:  Age of menarche at 11, , age of first pregnancy at 29, 5 years of OCP use, no HRT or fertility treatment.    PAST SURGICAL HISTORY:  Past Surgical History:   Procedure Laterality Date     ARTHROPLASTY HIP Left 10/4/2019    Procedure: Removal of left femoral hardware with a conversion to left total hip arthroplasty using a Biomet Annalisa femoral stem with an OsseoTi acetabular shell and a dual mobility bearing surface;  Surgeon: Spencer Celeste MD;  Location: [facility] OR     BIOPSY  2000     BIOPSY BREAST SEED LOCALIZATION Right 2022    Procedure: right breast tag localized lumpectomy;  Surgeon: Shelly Carr MD;  Location:  OR     BIOPSY NODE SENTINEL Right 2022    Procedure: with right sentinel lymph node biopsy;  Surgeon: Shelly Carr MD;  Location:  OR     BYPASS GRAFT FEMOROPOPLITEAL Left 2019    Procedure: LEFT FEMORAL TO ABOVE KNEE POPLITEAL  BYPASS WITH POLYTETRAFLUOROETHYLENE GRAFT;  Surgeon: Shade Owens MD;  Location:  OR     COLONOSCOPY N/A 2016    Procedure: COMBINED COLONOSCOPY, SINGLE OR MULTIPLE BIOPSY/POLYPECTOMY BY BIOPSY;  Surgeon: Varun Stanley  MD YUKI, MD;  Location:  GI     COLONOSCOPY N/A 12/10/2021    Procedure: COLONOSCOPY, WITH POLYPECTOMIES  USING COLD  SNARE,   CLIP APPLIED X2;  Surgeon: Dayton Luna MD;  Location:  GI     CYSTOSCOPY       EXCISION MALIG LESION>1.25CM  5/2000    Myxoid Liposarcoma       EXPLORE GROIN Right 5/1/2018    Procedure: EXPLORE GROIN;  EMERGENCY RIGHT FEMORAL EXPLORATION WITH FEMORAL ARTERY REPAIR.    EBL: 50mL;  Surgeon: Shade Owens MD;  Location: [facility] OR     HC COLP CERVIX/UPPER VAGINA  07/1997    Negative     HC DILATION/CURETTAGE DIAG/THER NON OB  02/1997    Post menopausal bleeding on HRT, negative     HIP SURGERY Left 04/13/2019     IR ANGIOGRAM THROUGH CATHETER FOLLOW UP  12/20/2018     IR ANGIOGRAM THROUGH CATHETER FOLLOW UP  12/21/2018     IR LOWER EXTREMITY ANGIOGRAM LEFT  12/19/2018     OPEN REDUCTION INTERNAL FIXATION PATELLA Left 12/21/2020    Procedure: Left partial patella fracture excision with advancement of the quadriceps tendon;  Surgeon: Stephen Rhodes MD;  Location:  OR     OPEN REDUCTION INTERNAL FIXATION RODDING INTRAMEDULLARY TIBIA Left 12/21/2020    Procedure: Open reduction intramedullary nailing of left tibia fracture;  Surgeon: Stephen Rhodes MD;  Location:  OR     REMOVE HARDWARE KNEE Left 5/31/2022    Procedure: Left knee patella hardware removal;  Surgeon: Spencer Celeste MD;  Location:  OR     REPAIR TENDON QUADRICEPS Left 7/28/2021    Procedure: Left knee quadricepsplasty with intraoperative patellar fracture requiring open reduction and internal fixation of the patella;  Surgeon: Spencer Celeste MD;  Location:  OR     REPAIR TENDON QUADRICEPS Left 5/31/2022    Procedure: Open left knee VY quadricepsplasty with hardware removal and allograft;  Surgeon: Spencer Celeste MD;  Location:  OR     VASCULAR SURGERY  04/30/2018    Left SFA stent in bypass graft     ZZC APPENDECTOMY      Appendectomy     ZZC NONSPECIFIC PROCEDURE  04/2000     Open Biopsy Left Thigh Liposarcoma     ZZHC COLONOSCOPY THRU STOMA, DIAGNOSTIC  2006    due          SOCIAL HISTORY:  Social History     Socioeconomic History     Marital status:      Spouse name: Chris     Number of children: 3     Years of education: 14     Highest education level: Associate degree: academic program   Occupational History     Occupation: at home     Employer: NONE    Tobacco Use     Smoking status: Never     Smokeless tobacco: Never   Vaping Use     Vaping Use: Never used   Substance and Sexual Activity     Alcohol use: Never     Drug use: Never     Sexual activity: Yes     Partners: Male     Birth control/protection: Male Surgical     Comment:  had a vasectomy   Other Topics Concern     Parent/sibling w/ CABG, MI or angioplasty before 65F 55M? No   Social History Narrative     Not on file     Social Determinants of Health     Financial Resource Strain: Low Risk      Difficulty of Paying Living Expenses: Not hard at all   Food Insecurity: No Food Insecurity     Worried About Running Out of Food in the Last Year: Never true     Ran Out of Food in the Last Year: Never true   Transportation Needs: No Transportation Needs     Lack of Transportation (Medical): No     Lack of Transportation (Non-Medical): No   Physical Activity: Not on file   Stress: Not on file   Social Connections: Not on file   Intimate Partner Violence: Not on file   Housing Stability: Not on file         FAMILY HISTORY:  Family History   Problem Relation Age of Onset     C.A.D. Father         MI 57     Alcohol/Drug Father         etoh     Coronary Artery Disease Father      Obesity Mother      Osteoporosis Mother      Colon Cancer Brother      Hyperlipidemia Son      Anxiety Disorder Son      Hyperlipidemia Son         very high, experimental drug     C.A.D. Paternal Grandmother         ascvd     Diabetes Maternal Grandmother      Cancer Maternal Grandmother      C.A.D. Paternal Uncle         Mi  age 48  "    Cancer Maternal Aunt         pancreatic CA     Hyperlipidemia Son      Hyperlipidemia Cousin      Brother with colon cancer.  Negative for breast, ovarian or prostate cancer.    PHYSICAL EXAM:  Vital signs:  /85   Pulse 71   Resp 16   Ht 1.676 m (5' 6\")   Wt 69.4 kg (153 lb)   LMP 03/18/2005   SpO2 98%   BMI 24.69 kg/m     GENERAL: No acute distress.  EYES: No scleral icterus. No overt erythema.  RESPIRATORY: No audible cough, wheezing, or labored breathing.  MUSCULOSKELETAL: Range of motion in the neck, shoulders, and arms appear normal.  SKIN: No overt rashes, discolorations, or lesions over the face and neck.  NEUROLOGIC: Alert.  No overt tremors.  PSYCHIATRIC: Normal affect and mood.  Does not appear anxious.    LABS:  CBC RESULTS: Recent Labs   Lab Test 12/08/22  1735   WBC 5.6   RBC 4.51   HGB 13.5   HCT 42.5   MCV 94   MCH 29.9   MCHC 31.8   RDW 14.4          Recent Labs   Lab Test 06/01/22  0732 05/31/22  1917 05/26/22  1345 03/02/22  0859   NA  --   --  139 140   POTASSIUM  --   --  3.9 4.5   CHLORIDE  --   --  106 108   CO2  --   --  29 27   ANIONGAP  --   --  4 5   *  --  107* 84   BUN  --   --  17 15   CR  --  0.58 0.62 0.68   LONG  --   --  10.0 9.2         PATHOLOGY:  Reviewed as per HPI.    IMAGING:  Reviewed as per HPI.    ASSESSMENT/PLAN:  Angelinydia Jacobson is a 72 year old female with the following issues:  1. Pathologic prognostic stage IA, [id number], grade 2 invasive ductal carcinoma of the right upper outer breast, ER positive (%), OK positive (70%), HER2/compa FISH negative  -I reviewed the final pathology results from 12/14/2022 with Angeli.  She had no residual invasive carcinoma and there were only 2 microscopic foci of DCIS. Margins were negative. No lymph node involvement.  Findings are consistent with stage IA disease.  - Discussed there is no role for Oncotype DX assay given lack of chemotherapy benefit for a T1a tumor that is ER positive HER-2 " negative.  -Discussed potential role of adjuvant radiation therapy to reduce risk of local recurrence.  -She would benefit from adjuvant endocrine therapy, preferably with aromatase inhibitor such as anastrozole or, alternately, tamoxifen.  -I discussed the potential side effects of anastrozole, including but not limited to: fatigue, myalgias/arthralgias, bone density loss, decrease sexual drive, vaginal dryness, nausea, headache, difficulty sleeping, hot flashes, night sweats, small cardiovascular risk.  I do note her history of osteoporosis.  --I discussed the potential side effects of tamoxifen, which may include, but not be limited to: muscle aches, mood changes, hot flashes, night sweats, weight gain, fatigue, nausea, earlier cataract formation, and a small risk of blood clots and uterine cancer.  --I reviewed her 12/22/2022 DEXA scan which showed osteoporosis.  Therefore, I recommended concurrent bone health agent such as IV Zometa or Prolia every 6 months concurrently with aromatase inhibitor therapy.    2.  History of myxoid liposarcoma of left thigh  - Status post excision and radiation completed in 2000.  She did not receive chemotherapy for her liposarcoma.  This has resulted in left lower extremity lymphedema.  - Continue lymphedema therapy and compression stockings.    3.  Peripheral arterial disease  -Has history of left lower extremity peripheral arterial disease and is status post femoral-popliteal bypass with later stent to bypass.  She is on aspirin and Plavix.    4. Osteoporosis  --History of taking alendronate, off for past year.  - Discussed DEXA scan from 12/22/2022 showed osteoporosis  - Continue adequate calcium and vitamin D. Advised starting denosumab or IV Zometa as discussed above.  Discussed potential adverse effects of each drug. She wishes to try denosumab.    She will be away on a trip 2/6-2/23/2023.  Discussed that we can work around this schedule for her adjuvant therapies and  follow-up.    Return in 3 months.    Emilia Angulo MD  Hematology/Oncology  [facility]    Total time spent: 30 minutes in patient evaluation, counseling, documentation, and coordination of care.

## 2022-12-30 ENCOUNTER — DOCUMENTATION ONLY (OUTPATIENT)
Dept: MAMMOGRAPHY | Facility: CLINIC | Age: 72
End: 2022-12-30

## 2022-12-30 NOTE — TELEPHONE ENCOUNTER
I spoke with patient today who informed me she would like to receive her radiation treatments at the [facility], so I called down to PENNY Albrecht and gave the  the referral information.    The schedulers at [facility] will reach out to patient to get her scheduled for the initial Radiation Oncology Consultation.     Jen Capps, RN BSN  Breast Nurse Care Coordinator  [facility]  [facility]  [phone]

## 2023-01-03 ENCOUNTER — OFFICE VISIT (OUTPATIENT)
Dept: SURGERY | Facility: CLINIC | Age: 73
End: 2023-01-03
Payer: COMMERCIAL

## 2023-01-03 DIAGNOSIS — Z17.0 MALIGNANT NEOPLASM OF CENTRAL PORTION OF RIGHT BREAST IN FEMALE, ESTROGEN RECEPTOR POSITIVE (H): ICD-10-CM

## 2023-01-03 DIAGNOSIS — C50.911 HORMONE RECEPTOR POSITIVE BREAST CANCER, RIGHT (H): ICD-10-CM

## 2023-01-03 DIAGNOSIS — Z09 SURGERY FOLLOW-UP EXAMINATION: Primary | ICD-10-CM

## 2023-01-03 DIAGNOSIS — C50.111 MALIGNANT NEOPLASM OF CENTRAL PORTION OF RIGHT BREAST IN FEMALE, ESTROGEN RECEPTOR POSITIVE (H): ICD-10-CM

## 2023-01-03 PROCEDURE — 99024 POSTOP FOLLOW-UP VISIT: CPT | Performed by: SURGERY

## 2023-01-03 NOTE — PROGRESS NOTES
[facility] Postoperative Note    S: Angeli reports she has had pain in her right breast and it is improving daily. The bruising is improved.     Breasts: right breast lumpectomy incision on upper breast is healing well. Axillary incision also healing well. Resolving ecchymosis. Moderate underlying fibrosis at lumpectomy site on upper breast.     Pathology: reviewed and copy given    A/P  Angeli Jacobson is recovering from a right breast partial mastectomy with right SLNB on 12/14/2022 and her final pathology revealed no residual invasive malignancy with two foci of DCIS measuring 1.2mm and final margins clear. Additional margins including pectoral posterior margin were benign. Two sentinel nodes were benign. Staging is [id number] based on initial biopsy with tumor measuring 3.2mm in size. Plan for appt to discuss adjuvant radiation at [facility] in [place] and follow up with Dr. Angulo with medical oncology.       Mammogram on right in 6 months and bilateral in one year. She will follow up with me after imaging in one year.     Thank you for the opportunity to help in her care.    Shelly Carr MD  [facility]  [phone]    Please route or send letter to:  Primary Care Provider (PCP) and Referring Provider

## 2023-01-05 ENCOUNTER — ONCOLOGY VISIT (OUTPATIENT)
Dept: ONCOLOGY | Facility: CLINIC | Age: 73
End: 2023-01-05
Attending: INTERNAL MEDICINE
Payer: COMMERCIAL

## 2023-01-05 VITALS
HEIGHT: 66 IN | OXYGEN SATURATION: 98 % | DIASTOLIC BLOOD PRESSURE: 85 MMHG | HEART RATE: 71 BPM | SYSTOLIC BLOOD PRESSURE: 133 MMHG | WEIGHT: 153 LBS | BODY MASS INDEX: 24.59 KG/M2 | RESPIRATION RATE: 16 BRPM

## 2023-01-05 DIAGNOSIS — M81.0 AGE-RELATED OSTEOPOROSIS WITHOUT CURRENT PATHOLOGICAL FRACTURE: ICD-10-CM

## 2023-01-05 DIAGNOSIS — C50.411 MALIGNANT NEOPLASM OF UPPER-OUTER QUADRANT OF RIGHT BREAST IN FEMALE, ESTROGEN RECEPTOR POSITIVE (H): Primary | ICD-10-CM

## 2023-01-05 DIAGNOSIS — Z17.0 MALIGNANT NEOPLASM OF UPPER-OUTER QUADRANT OF RIGHT BREAST IN FEMALE, ESTROGEN RECEPTOR POSITIVE (H): Primary | ICD-10-CM

## 2023-01-05 PROCEDURE — G0463 HOSPITAL OUTPT CLINIC VISIT: HCPCS | Performed by: INTERNAL MEDICINE

## 2023-01-05 PROCEDURE — 99214 OFFICE O/P EST MOD 30 MIN: CPT | Performed by: INTERNAL MEDICINE

## 2023-01-05 RX ORDER — EPINEPHRINE 1 MG/ML
0.3 INJECTION, SOLUTION INTRAMUSCULAR; SUBCUTANEOUS EVERY 5 MIN PRN
Status: CANCELLED | OUTPATIENT
Start: 2023-01-11

## 2023-01-05 RX ORDER — ALBUTEROL SULFATE 90 UG/1
1-2 AEROSOL, METERED RESPIRATORY (INHALATION)
Status: CANCELLED
Start: 2023-01-11

## 2023-01-05 RX ORDER — ANASTROZOLE 1 MG/1
1 TABLET ORAL DAILY
Qty: 90 TABLET | Refills: 3 | Status: SHIPPED | OUTPATIENT
Start: 2023-01-05 | End: 2023-06-19

## 2023-01-05 RX ORDER — ALBUTEROL SULFATE 0.83 MG/ML
2.5 SOLUTION RESPIRATORY (INHALATION)
Status: CANCELLED | OUTPATIENT
Start: 2023-01-11

## 2023-01-05 RX ORDER — MEPERIDINE HYDROCHLORIDE 25 MG/ML
25 INJECTION INTRAMUSCULAR; INTRAVENOUS; SUBCUTANEOUS EVERY 30 MIN PRN
Status: CANCELLED | OUTPATIENT
Start: 2023-01-11

## 2023-01-05 RX ORDER — DIPHENHYDRAMINE HYDROCHLORIDE 50 MG/ML
50 INJECTION INTRAMUSCULAR; INTRAVENOUS
Status: CANCELLED
Start: 2023-01-11

## 2023-01-05 RX ORDER — METHYLPREDNISOLONE SODIUM SUCCINATE 125 MG/2ML
125 INJECTION, POWDER, LYOPHILIZED, FOR SOLUTION INTRAMUSCULAR; INTRAVENOUS
Status: CANCELLED
Start: 2023-01-11

## 2023-01-05 ASSESSMENT — PAIN SCALES - GENERAL: PAINLEVEL: NO PAIN (0)

## 2023-01-05 NOTE — PROGRESS NOTES
"Oncology Rooming Note    January 5, 2023 10:29 AM   Angeli Jacobson is a 72 year old female who presents for:    Chief Complaint   Patient presents with     Oncology Clinic Visit     Initial Vitals: LMP 03/18/2005  Estimated body mass index is 24.53 kg/m  as calculated from the following:    Height as of 12/14/22: 1.676 m (5' 6\").    Weight as of 12/14/22: 68.9 kg (152 lb). There is no height or weight on file to calculate BSA.  Data Unavailable Comment: Data Unavailable   Patient's last menstrual period was 03/18/2005.  Allergies reviewed: Yes  Medications reviewed: Yes    Medications: Medication refills not needed today.  Pharmacy name entered into [employer]: [id number] [place], [place] - [address] [place] [address]    Clinical concerns:  doctor was notified.      Bharti Moreland MA            "

## 2023-01-05 NOTE — LETTER
1/5/2023         RE: Angeli Jacobson  [id number]  [facility] [id number]        Dear Colleague,    Thank you for referring your patient, Angeli Jacobson, to the [facility]. Please see a copy of my visit note below.    [facility]    Hematology/Oncology Established Patient Note      Today's Date: 1/5/2023    Reason for follow-up:  Right breast cancer.    HISTORY OF PRESENT ILLNESS: Angeli Jacobson is a 72 year old female with history of left thigh liposarcoma status post excision in 2000 with subsequent left leg lymphedema and peripheral arterial disease status post redo left common femoral to popliteal artery bypass in January 2019, who presents with the following oncologic history:  1.  11/7/2022: Screening mammogram showed asymmetry in the right breast at 12:00, retroareolar far posterior.  Left breast negative.  2.  11/15/2022: Diagnostic right mammogram showed asymmetry in the posterior right breast, 8 cm from nipple.  Ultrasound of right breast at 12:00, 2 cm from nipple showed a small benign cyst but no definite sonographic correlate for the mammographic asymmetry.  3.  11/16/2022: Stereotactic guided right breast needle biopsy of 8 mm lesion at 3:00, 8 cm from the nipple showed grade 2 invasive ductal carcinoma, ER positive at %, IL positive at 60-70%, HER2 IHC = 2+ equivocal, HER2/compa FISH negative.  4. 12/14/2022: Underwent right breast lumpectomy with right axillary sentinel lymph node excision with Dr. Shelly Carr.  Pathology showed no residual invasive malignancy; 2 microscopic foci of DCIS, grade 2, largest measuring 1.2 mm; margins negative; total 2 right axillary lymph nodes negative.      INTERVAL HISTORY:  Angeli reports healing well from her surgery.      REVIEW OF SYSTEMS:   14 point ROS was reviewed and is negative other than as noted above in HPI.       HOME MEDICATIONS:  Current Outpatient Medications    Medication Sig Dispense Refill     aspirin (ASA) 81 MG chewable tablet Take 81 mg by mouth daily       calcium carbonate 600 mg-vitamin D 400 units (CALTRATE) 600-400 MG-UNIT per tablet Take 1 chew tab by mouth daily       clopidogrel (PLAVIX) 75 MG tablet TAKE 1 TABLET BY MOUTH EVERY DAY 90 tablet 3     econazole nitrate 1 % external cream Apply topically daily (Patient taking differently: Apply topically daily Toes) 85 g 3     evolocumab (REPATHA) 140 MG/ML prefilled autoinjector Inject 1 mL (140 mg) Subcutaneous every 14 days 6 mL 3     ezetimibe (ZETIA) 10 MG tablet Take 1 tablet (10 mg) by mouth daily 90 tablet 3     HYDROcodone-acetaminophen (NORCO) 5-325 MG tablet Take 1 tablet by mouth every 4 hours as needed for moderate to severe pain 5 tablet 0     levothyroxine (SYNTHROID/LEVOTHROID) 75 MCG tablet Take 1 tablet (75 mcg) by mouth daily 90 tablet 3     multivitamin, therapeutic (THERA-VIT) TABS tablet Take 1 tablet by mouth daily       nitroFURantoin macrocrystal-monohydrate (MACROBID) 100 MG capsule TAKE 1 CAPSULE BY MOUTH AFTER INTRCOURSE 30 capsule 3     rosuvastatin (CRESTOR) 20 MG tablet Take 1 tablet (20 mg) by mouth daily 90 tablet 3     senna-docusate (SENOKOT-S/PERICOLACE) 8.6-50 MG tablet Take 1-2 tablets by mouth 2 times daily 15 tablet 0     solifenacin (VESICARE) 5 MG tablet Take 1 tablet (5 mg) by mouth daily 90 tablet 3     Vitamin D3 (CHOLECALCIFEROL) 25 mcg (1000 units) tablet Take 2 tablets (50 mcg) by mouth daily 60 tablet 0         ALLERGIES:  Allergies   Allergen Reactions     Celexa [Citalopram Hydrobromide]      Decreased libido         PAST MEDICAL HISTORY:  Past Medical History:   Diagnosis Date     * * * SBE PROPHYLAXIS * * * 1998    Amox 500mg, take 4 tabs one hour prior to procedure.Takes this because of lymphedema secondary from leg surgey     Antiplatelet or antithrombotic long-term use      Arrhythmia      Central serous retinopathy 2001    Resolved 9/2001     CHRONIC NECK  PAIN      Depressive disorder      Depressive disorder, not elsewhere classified      Elevated coronary artery calcium score=2021     Elevated coronary artery calcium score=2021     History of blood transfusion 2019    during left hip pinning, during surgery for patella     Lateral epicondylitis      MIXED HYPERLIPIDEMIA, LDL GOAL <160     LDL goal < 160     Motion sickness      MYXOID LIPOSARCOMA 2000    Left thigh, S/P excision, radiation  at [facility]     Myxoid liposarcoma (HCC) 2004    CHRONIC LEFT THIGH LYMPHEDEMA     Nontoxic multinodular goiter 2005    needs yearly US     Osteoporosis, unspecified      Other chronic pain     fx ribs left      Other lymphedema     left thigh, gets regular PT for this     Overactive bladder      PAD (peripheral artery disease) (H) 2018     PONV (postoperative nausea and vomiting)      SHINGLES      Sprain of lumbosacral (joint) (ligament)     right     Unspecified hearing loss     chronic tinnitus     Unspecified tinnitus      Gynecologic history:  Age of menarche at 11, , age of first pregnancy at 29, 5 years of OCP use, no HRT or fertility treatment.    PAST SURGICAL HISTORY:  Past Surgical History:   Procedure Laterality Date     ARTHROPLASTY HIP Left 10/4/2019    Procedure: Removal of left femoral hardware with a conversion to left total hip arthroplasty using a Biomet Annalisa femoral stem with an OsseoTi acetabular shell and a dual mobility bearing surface;  Surgeon: Spencer Celeste MD;  Location: [facility] OR     BIOPSY  2000     BIOPSY BREAST SEED LOCALIZATION Right 2022    Procedure: right breast tag localized lumpectomy;  Surgeon: Shelly Carr MD;  Location: [facility] OR     BIOPSY NODE SENTINEL Right 2022    Procedure: with right sentinel lymph node biopsy;  Surgeon: Shelly Carr MD;  Location: [facility] OR     BYPASS GRAFT FEMOROPOPLITEAL Left 2019     Procedure: LEFT FEMORAL TO ABOVE KNEE POPLITEAL  BYPASS WITH POLYTETRAFLUOROETHYLENE GRAFT;  Surgeon: Shade Owens MD;  Location: [facility] OR     COLONOSCOPY N/A 2/5/2016    Procedure: COMBINED COLONOSCOPY, SINGLE OR MULTIPLE BIOPSY/POLYPECTOMY BY BIOPSY;  Surgeon: Varun Stanley MD, MD;  Location:  GI     COLONOSCOPY N/A 12/10/2021    Procedure: COLONOSCOPY, WITH POLYPECTOMIES  USING COLD  SNARE,   CLIP APPLIED X2;  Surgeon: Dayton Luna MD;  Location:  GI     CYSTOSCOPY       EXCISION MALIG LESION>1.25CM  5/2000    Myxoid Liposarcoma       EXPLORE GROIN Right 5/1/2018    Procedure: EXPLORE GROIN;  EMERGENCY RIGHT FEMORAL EXPLORATION WITH FEMORAL ARTERY REPAIR.    EBL: 50mL;  Surgeon: Shade Owens MD;  Location: [facility] OR     HC COLP CERVIX/UPPER VAGINA  07/1997    Negative     HC DILATION/CURETTAGE DIAG/THER NON OB  02/1997    Post menopausal bleeding on HRT, negative     HIP SURGERY Left 04/13/2019     IR ANGIOGRAM THROUGH CATHETER FOLLOW UP  12/20/2018     IR ANGIOGRAM THROUGH CATHETER FOLLOW UP  12/21/2018     IR LOWER EXTREMITY ANGIOGRAM LEFT  12/19/2018     OPEN REDUCTION INTERNAL FIXATION PATELLA Left 12/21/2020    Procedure: Left partial patella fracture excision with advancement of the quadriceps tendon;  Surgeon: Stephen Rhodes MD;  Location:  OR     OPEN REDUCTION INTERNAL FIXATION RODDING INTRAMEDULLARY TIBIA Left 12/21/2020    Procedure: Open reduction intramedullary nailing of left tibia fracture;  Surgeon: Stephen Rhodes MD;  Location: [facility] OR     REMOVE HARDWARE KNEE Left 5/31/2022    Procedure: Left knee patella hardware removal;  Surgeon: Spencer Celeste MD;  Location:  OR     REPAIR TENDON QUADRICEPS Left 7/28/2021    Procedure: Left knee quadricepsplasty with intraoperative patellar fracture requiring open reduction and internal fixation of the patella;  Surgeon: Spencer Celeste MD;  Location: [facility] OR     REPAIR TENDON QUADRICEPS Left 5/31/2022     Procedure: Open left knee VY quadricepsplasty with hardware removal and allograft;  Surgeon: Spencer Celeste MD;  Location: [facility] OR     VASCULAR SURGERY  04/30/2018    Left SFA stent in bypass graft     ZZC APPENDECTOMY      Appendectomy     ZZC NONSPECIFIC PROCEDURE  04/2000    Open Biopsy Left Thigh Liposarcoma     ZZHC COLONOSCOPY THRU STOMA, DIAGNOSTIC  2006    due 2010         SOCIAL HISTORY:  Social History     Socioeconomic History     Marital status:      Spouse name: Chris     Number of children: 3     Years of education: 14     Highest education level: Associate degree: academic program   Occupational History     Occupation: at home     Employer: NONE    Tobacco Use     Smoking status: Never     Smokeless tobacco: Never   Vaping Use     Vaping Use: Never used   Substance and Sexual Activity     Alcohol use: Never     Drug use: Never     Sexual activity: Yes     Partners: Male     Birth control/protection: Male Surgical     Comment:  had a vasectomy   Other Topics Concern     Parent/sibling w/ CABG, MI or angioplasty before 65F 55M? No   Social History Narrative     Not on file     Social Determinants of Health     Financial Resource Strain: Low Risk      Difficulty of Paying Living Expenses: Not hard at all   Food Insecurity: No Food Insecurity     Worried About Running Out of Food in the Last Year: Never true     Ran Out of Food in the Last Year: Never true   Transportation Needs: No Transportation Needs     Lack of Transportation (Medical): No     Lack of Transportation (Non-Medical): No   Physical Activity: Not on file   Stress: Not on file   Social Connections: Not on file   Intimate Partner Violence: Not on file   Housing Stability: Not on file         FAMILY HISTORY:  Family History   Problem Relation Age of Onset     C.A.D. Father         MI 57     Alcohol/Drug Father         etoh     Coronary Artery Disease Father      Obesity Mother      Osteoporosis Mother      Colon Cancer  "Brother      Hyperlipidemia Son      Anxiety Disorder Son      Hyperlipidemia Son         very high, experimental drug     C.A.D. Paternal Grandmother         ascvd     Diabetes Maternal Grandmother      Cancer Maternal Grandmother      YOON.A.KARYN. Paternal Uncle         Mi  age 48     Cancer Maternal Aunt         pancreatic CA     Hyperlipidemia Son      Hyperlipidemia Cousin      Brother with colon cancer.  Negative for breast, ovarian or prostate cancer.    PHYSICAL EXAM:  Vital signs:  /85   Pulse 71   Resp 16   Ht 1.676 m (5' 6\")   Wt 69.4 kg (153 lb)   LMP 2005   SpO2 98%   BMI 24.69 kg/m     GENERAL: No acute distress.  EYES: No scleral icterus. No overt erythema.  RESPIRATORY: No audible cough, wheezing, or labored breathing.  MUSCULOSKELETAL: Range of motion in the neck, shoulders, and arms appear normal.  SKIN: No overt rashes, discolorations, or lesions over the face and neck.  NEUROLOGIC: Alert.  No overt tremors.  PSYCHIATRIC: Normal affect and mood.  Does not appear anxious.    LABS:  CBC RESULTS: Recent Labs   Lab Test 22  1735   WBC 5.6   RBC 4.51   HGB 13.5   HCT 42.5   MCV 94   MCH 29.9   MCHC 31.8   RDW 14.4          Recent Labs   Lab Test 22  0732 22  1917 22  1345 22  0859   NA  --   --  139 140   POTASSIUM  --   --  3.9 4.5   CHLORIDE  --   --  106 108   CO2  --   --  29 27   ANIONGAP  --   --  4 5   *  --  107* 84   BUN  --   --  17 15   CR  --  0.58 0.62 0.68   LONG  --   --  10.0 9.2         PATHOLOGY:  Reviewed as per HPI.    IMAGING:  Reviewed as per HPI.    ASSESSMENT/PLAN:  Angeli Jacobson is a 72 year old female with the following issues:  1. Pathologic prognostic stage IA, rJ2y-H0-J5, grade 2 invasive ductal carcinoma of the right upper outer breast, ER positive (%), VA positive (70%), HER2/compa FISH negative  -I reviewed the final pathology results from 2022 with Angeli.  She had no residual invasive " carcinoma and there were only 2 microscopic foci of DCIS. Margins were negative. No lymph node involvement.  Findings are consistent with stage IA disease.  - Discussed there is no role for Oncotype DX assay given lack of chemotherapy benefit for a T1a tumor that is ER positive HER-2 negative.  -Discussed potential role of adjuvant radiation therapy to reduce risk of local recurrence.  -She would benefit from adjuvant endocrine therapy, preferably with aromatase inhibitor such as anastrozole or, alternately, tamoxifen.  -I discussed the potential side effects of anastrozole, including but not limited to: fatigue, myalgias/arthralgias, bone density loss, decrease sexual drive, vaginal dryness, nausea, headache, difficulty sleeping, hot flashes, night sweats, small cardiovascular risk.  I do note her history of osteoporosis.  --I discussed the potential side effects of tamoxifen, which may include, but not be limited to: muscle aches, mood changes, hot flashes, night sweats, weight gain, fatigue, nausea, earlier cataract formation, and a small risk of blood clots and uterine cancer.  --I reviewed her 12/22/2022 DEXA scan which showed osteoporosis.  Therefore, I recommended concurrent bone health agent such as IV Zometa or Prolia every 6 months concurrently with aromatase inhibitor therapy.    2.  History of myxoid liposarcoma of left thigh  - Status post excision and radiation completed in 2000.  She did not receive chemotherapy for her liposarcoma.  This has resulted in left lower extremity lymphedema.  - Continue lymphedema therapy and compression stockings.    3.  Peripheral arterial disease  -Has history of left lower extremity peripheral arterial disease and is status post femoral-popliteal bypass with later stent to bypass.  She is on aspirin and Plavix.    4. Osteoporosis  --History of taking alendronate, off for past year.  - Discussed DEXA scan from 12/22/2022 showed osteoporosis  - Continue adequate calcium  "and vitamin D. Advised starting denosumab or IV Zometa as discussed above.  Discussed potential adverse effects of each drug. She wishes to try denosumab.    She will be away on a trip 2/6-2/23/2023.  Discussed that we can work around this schedule for her adjuvant therapies and follow-up.    Return in 3 months.    Emilia Angulo MD  Hematology/Oncology  [facility]    Total time spent: 30 minutes in patient evaluation, counseling, documentation, and coordination of care.     Oncology Rooming Note    January 5, 2023 10:29 AM   Angeli Jacobson is a 72 year old female who presents for:    Chief Complaint   Patient presents with     Oncology Clinic Visit     Initial Vitals: LMP 03/18/2005  Estimated body mass index is 24.53 kg/m  as calculated from the following:    Height as of 12/14/22: 1.676 m (5' 6\").    Weight as of 12/14/22: 68.9 kg (152 lb). There is no height or weight on file to calculate BSA.  Data Unavailable Comment: Data Unavailable   Patient's last menstrual period was 03/18/2005.  Allergies reviewed: Yes  Medications reviewed: Yes    Medications: Medication refills not needed today.  Pharmacy name entered into [employer]: [id number] [place] [address]    Clinical concerns:  doctor was notified.      Bharti Moreland MA                Again, thank you for allowing me to participate in the care of your patient.        Sincerely,        Emilia Angulo MD    "

## 2023-01-05 NOTE — PATIENT INSTRUCTIONS
Arrange for Prolia after insurance approval.  RTC MD in 3 months.  Anastrozole prescription issued -- may start on it right away if not doing radiation or after back from trip; or, if doing radiation, may start 1 week post-radiation.

## 2023-01-09 DIAGNOSIS — N39.0 RECURRENT UTI: ICD-10-CM

## 2023-01-09 DIAGNOSIS — E03.9 HYPOTHYROIDISM, UNSPECIFIED TYPE: ICD-10-CM

## 2023-01-09 NOTE — PROGRESS NOTES
General acute hospital   PM&R clinic note        Interval history:     Angeli Jacobson presents to clinic today for follow up reg her rehab needs.   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   Was last seen in clinic on 10/4/22.   Recommendations included:   1. Work-up: None today  2. Therapy/equipment/braces:   - Continue with the lymphedema wraps during the day. Follow PT recommendations for any further instructions on when to remove and keep on.  - Continue with HEP as recommended by therapies, would like to focus on those strengthening and stretching.   3. Medications: continue current medications  4. Interventions: None today  5. Referral / follow up with other providers: None today  6. Follow up: RTC in 3 months with Dr. Leeann Sharma.    She unfortunately since the last appointment was found to have a right breast mass. See below for details from Dr. Castro 1/05/22 note.   Oncologic history:  1.  11/7/2022: Screening mammogram showed asymmetry in the right breast at 12:00, retroareolar far posterior.  Left breast negative.  2.  11/15/2022: Diagnostic right mammogram showed asymmetry in the posterior right breast, 8 cm from nipple.  Ultrasound of right breast at 12:00, 2 cm from nipple showed a small benign cyst but no definite sonographic correlate for the mammographic asymmetry.  3.  11/16/2022: Stereotactic guided right breast needle biopsy of 8 mm lesion at 3:00, 8 cm from the nipple showed grade 2 invasive ductal carcinoma, ER positive at %, MN positive at 60-70%, HER2 IHC = 2+ equivocal, HER2/compa FISH negative.  4. 12/14/2022: Underwent right breast  lumpectomy with right axillary sentinel lymph node excision with Dr. Shelly Carr.  Pathology showed no residual invasive malignancy; 2 microscopic foci of DCIS, grade 2, largest measuring 1.2 mm; margins negative; total 2 right axillary lymph nodes negative.    Symptoms,  Patient was seen for a return visit today.  She has been continuing her recovery process, but feels that it is slow at times.  She feels like she is not completely back to her baseline yet.  She has been having a lot of fluid around her right knee joint which is inhibiting her function.  Swelling has been stable since she was last seen in Ortho clinic for follow-up.  She has been doing her home regimen for lymphedema management and feels that her swelling has been very stable.  She does endorse some improved mobility, including improved strength in bilateral lower extremities.      Therapies/HEP,  She was discharged from lymphedema outpatient therapy on 11/10/22 (decreased 13.4% from eval). She will be doing HEP and lymphedema management at home. Goal is now as she moves forward she will not be wrapping at night.      Functionally,   No changes since her last visit.     Social history is unchanged.    Medications:  Current Outpatient Medications   Medication Sig Dispense Refill     anastrozole (ARIMIDEX) 1 MG tablet Take 1 tablet (1 mg) by mouth daily 90 tablet 3     aspirin (ASA) 81 MG chewable tablet Take 81 mg by mouth daily       calcium carbonate 600 mg-vitamin D 400 units (CALTRATE) 600-400 MG-UNIT per tablet Take 1 chew tab by mouth daily       clopidogrel (PLAVIX) 75 MG tablet TAKE 1 TABLET BY MOUTH EVERY DAY 90 tablet 3     econazole nitrate 1 % external cream Apply topically daily (Patient taking differently: Apply topically daily Toes) 85 g 3     evolocumab (REPATHA) 140 MG/ML prefilled autoinjector Inject 1 mL (140 mg) Subcutaneous every 14 days 6 mL 3     ezetimibe (ZETIA) 10 MG tablet Take 1 tablet (10 mg) by mouth daily 90 tablet  3     levothyroxine (SYNTHROID/LEVOTHROID) 75 MCG tablet Take 1 tablet (75 mcg) by mouth daily 90 tablet 3     multivitamin, therapeutic (THERA-VIT) TABS tablet Take 1 tablet by mouth daily       nitroFURantoin macrocrystal-monohydrate (MACROBID) 100 MG capsule TAKE 1 CAPSULE BY MOUTH AFTER INTRCOURSE 30 capsule 3     rosuvastatin (CRESTOR) 20 MG tablet Take 1 tablet (20 mg) by mouth daily 90 tablet 3     senna-docusate (SENOKOT-S/PERICOLACE) 8.6-50 MG tablet Take 1-2 tablets by mouth 2 times daily 15 tablet 0     solifenacin (VESICARE) 5 MG tablet Take 1 tablet (5 mg) by mouth daily 90 tablet 3     Vitamin D3 (CHOLECALCIFEROL) 25 mcg (1000 units) tablet Take 2 tablets (50 mcg) by mouth daily 60 tablet 0              Physical Exam:   [facility] 03/18/2005   Gen: NAD, pleasant and cooperative   HEENT: MMM  Cardio: regular pulse  Pulm: non-labored breathing in room air  Abd: benign  Ext: Mild lymphedema in LLE extending from thigh to foot, improved from previous visit.. Scar tissue and tightness noted at anterior left thigh. Positive Stemmer sign on left.   No erythema, warmth noted.  Well-healed surgical incision over left knee.  Right knee with no surrounding erythema, some increased swelling over knee joint.  Fluid shift test positive for effusion.  Neuro/MSK: Limited ROM w/ hip flexion, 0 degrees in full knee extension and knee flexion to 90 degrees. ROM improved at knee joint.        Labs/Imaging:  Lab Results   Component Value Date    WBC 5.6 12/08/2022    HGB 13.5 12/08/2022    HCT 42.5 12/08/2022    MCV 94 12/08/2022     12/08/2022     Lab Results   Component Value Date     12/08/2022    POTASSIUM 4.6 12/08/2022    CHLORIDE 104 12/08/2022    CO2 21 (L) 12/08/2022    GLC 82 12/08/2022     Lab Results   Component Value Date    GFRESTIMATED 82 12/08/2022    GFRESTBLACK >90 05/17/2021     Lab Results   Component Value Date    AST 27 12/08/2022    ALT 20 12/08/2022    ALKPHOS 84 12/08/2022    BILITOTAL 0.3  12/08/2022    BILICONJ 0.0 06/06/2006     Lab Results   Component Value Date    INR 1.01 09/27/2019     Lab Results   Component Value Date    BUN 20.3 12/08/2022    CR 0.77 12/08/2022              Assessment/Plan   Angeli Jacobson presents to clinic today for follow up reg her rehab needs.   She has h/o  LLE peripheral artery disease s/p femoral-popliteal bypass along w/ stent to the bypass later, on DAPT, myxoid liposarcoma of left thigh s/p surgery and XRT (UMN 2000), LLE lymphedema, hx of left tibia fracture and quadriceps rupture (s/p repair June 2020),  S/p left knee quadricepsplasty (7/28/21), HTN, HLD, hypothyroidism and now more recently diagnosed with right breast cancer (stage IA, [id number], grade 2 invasive ductal carcinoma of the right upper outer breast, ER positive (%), SC positive (70%), HER2/compa FISH negative) status post lumpectomy with no residual malignancy. Was last seen in clinic on 10/4/2022.   At today's visit, she continues to improve and make progress. Discussed with her and  on goal to continue follow ups in clinic. Agree will need further review from the orthopedic doctors on the R. Knee possible effusion and will leave the team to give further recommendations and management plan.     Recommendations:  7. Work-up: None today  8. Therapy/equipment/braces:   - Continue with the lymphedema wraps during the day. Follow previous PT recommendations for any further instructions on when to remove and keep on.  - Continue with HEP as recommended by therapies, would like to focus on those strengthening and stretching.   9. Medications: continue current medications  10. Interventions: None today  11. Referral / follow up with other providers: Follow-up with orthopedic surgery for right knee effusion.  12. Follow up: RTC in 3 months       Patient was seen and discussed with Dr. Leeann Sharma.    Frankie Ruiz DO  Resident Physician PGY-3  Physical Medicine &  Rehabilitation    Physician Attestation   I, Leeann Sharma MD, saw this patient and agree with the findings and plan of care as documented in the note.      Items personally reviewed/procedural attestation: vitals, labs and imaging and agree with the interpretation documented in the note.    Leeann Sharma MD       50 minutes spent on the date of the encounter doing chart review, history and exam, documentation and further activities as noted above.

## 2023-01-10 ENCOUNTER — MYC MEDICAL ADVICE (OUTPATIENT)
Dept: SURGERY | Facility: CLINIC | Age: 73
End: 2023-01-10

## 2023-01-10 ENCOUNTER — ONCOLOGY VISIT (OUTPATIENT)
Dept: ONCOLOGY | Facility: CLINIC | Age: 73
End: 2023-01-10
Attending: STUDENT IN AN ORGANIZED HEALTH CARE EDUCATION/TRAINING PROGRAM
Payer: COMMERCIAL

## 2023-01-10 VITALS
HEART RATE: 68 BPM | SYSTOLIC BLOOD PRESSURE: 144 MMHG | BODY MASS INDEX: 24.69 KG/M2 | OXYGEN SATURATION: 97 % | DIASTOLIC BLOOD PRESSURE: 84 MMHG | WEIGHT: 153 LBS | RESPIRATION RATE: 16 BRPM

## 2023-01-10 DIAGNOSIS — R53.81 PHYSICAL DECONDITIONING: ICD-10-CM

## 2023-01-10 DIAGNOSIS — I89.0 LYMPHEDEMA OF LEFT LEG: ICD-10-CM

## 2023-01-10 DIAGNOSIS — Z85.831 HISTORY OF SARCOMA OF SOFT TISSUE: ICD-10-CM

## 2023-01-10 DIAGNOSIS — Z96.649 STATUS POST TOTAL REPLACEMENT OF HIP, UNSPECIFIED LATERALITY: ICD-10-CM

## 2023-01-10 DIAGNOSIS — C50.411 MALIGNANT NEOPLASM OF UPPER-OUTER QUADRANT OF RIGHT BREAST IN FEMALE, ESTROGEN RECEPTOR POSITIVE (H): Primary | ICD-10-CM

## 2023-01-10 DIAGNOSIS — Z17.0 MALIGNANT NEOPLASM OF UPPER-OUTER QUADRANT OF RIGHT BREAST IN FEMALE, ESTROGEN RECEPTOR POSITIVE (H): Primary | ICD-10-CM

## 2023-01-10 PROCEDURE — G0463 HOSPITAL OUTPT CLINIC VISIT: HCPCS | Performed by: STUDENT IN AN ORGANIZED HEALTH CARE EDUCATION/TRAINING PROGRAM

## 2023-01-10 PROCEDURE — 99215 OFFICE O/P EST HI 40 MIN: CPT | Mod: GC | Performed by: STUDENT IN AN ORGANIZED HEALTH CARE EDUCATION/TRAINING PROGRAM

## 2023-01-10 RX ORDER — NITROFURANTOIN 25; 75 MG/1; MG/1
CAPSULE ORAL
Qty: 30 CAPSULE | Refills: 3 | OUTPATIENT
Start: 2023-01-10

## 2023-01-10 RX ORDER — LEVOTHYROXINE SODIUM 75 UG/1
TABLET ORAL
Qty: 90 TABLET | Refills: 0 | Status: SHIPPED | OUTPATIENT
Start: 2023-01-10 | End: 2023-04-04

## 2023-01-10 ASSESSMENT — PAIN SCALES - GENERAL: PAINLEVEL: NO PAIN (0)

## 2023-01-10 NOTE — TELEPHONE ENCOUNTER
Medication for acute visit 1yr ago. Needs appointment for reevaluation of any symptoms or need for abs.

## 2023-01-10 NOTE — LETTER
1/10/2023         RE: Angeli Jacobson  [id number]  [facility] [address]        Dear Colleague,    Thank you for referring your patient, Angeli Jacobson, to the [facility]. Please see a copy of my visit note below.    [facility]   PM&R clinic note        Interval history:     Angeli Jacobson presents to clinic today for follow up reg her rehab needs.   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   Was last seen in clinic on 10/4/22.   Recommendations included:   1. Work-up: None today  2. Therapy/equipment/braces:   - Continue with the lymphedema wraps during the day. Follow PT recommendations for any further instructions on when to remove and keep on.  - Continue with HEP as recommended by therapies, would like to focus on those strengthening and stretching.   3. Medications: continue current medications  4. Interventions: None today  5. Referral / follow up with other providers: None today  6. Follow up: RTC in 3 months with Dr. Leeann Sharma.    She unfortunately since the last appointment was found to have a right breast mass. See below for details from Dr. Castro 1/05/22 note.   Oncologic history:  1.  11/7/2022: Screening mammogram showed asymmetry in the right breast at 12:00, retroareolar far posterior.  Left breast negative.  2.  11/15/2022: Diagnostic right mammogram showed asymmetry in the posterior right breast, 8 cm from nipple.  Ultrasound of right breast at 12:00, 2 cm from nipple showed a small benign cyst but no definite sonographic correlate for the mammographic asymmetry.  3.  11/16/2022:  Stereotactic guided right breast needle biopsy of 8 mm lesion at 3:00, 8 cm from the nipple showed grade 2 invasive ductal carcinoma, ER positive at %, HI positive at 60-70%, HER2 IHC = 2+ equivocal, HER2/compa FISH negative.  4. 12/14/2022: Underwent right breast lumpectomy with right axillary sentinel lymph node excision with Dr. Shelly Carr.  Pathology showed no residual invasive malignancy; 2 microscopic foci of DCIS, grade 2, largest measuring 1.2 mm; margins negative; total 2 right axillary lymph nodes negative.    Symptoms,  Patient was seen for a return visit today.  She has been continuing her recovery process, but feels that it is slow at times.  She feels like she is not completely back to her baseline yet.  She has been having a lot of fluid around her right knee joint which is inhibiting her function.  Swelling has been stable since she was last seen in Ortho clinic for follow-up.  She has been doing her home regimen for lymphedema management and feels that her swelling has been very stable.  She does endorse some improved mobility, including improved strength in bilateral lower extremities.      Therapies/HEP,  She was discharged from lymphedema outpatient therapy on 11/10/22 (decreased 13.4% from eval). She will be doing HEP and lymphedema management at home. Goal is now as she moves forward she will not be wrapping at night.      Functionally,   No changes since her last visit.     Social history is unchanged.    Medications:  Current Outpatient Medications   Medication Sig Dispense Refill     anastrozole (ARIMIDEX) 1 MG tablet Take 1 tablet (1 mg) by mouth daily 90 tablet 3     aspirin (ASA) 81 MG chewable tablet Take 81 mg by mouth daily       calcium carbonate 600 mg-vitamin D 400 units (CALTRATE) 600-400 MG-UNIT per tablet Take 1 chew tab by mouth daily       clopidogrel (PLAVIX) 75 MG tablet TAKE 1 TABLET BY MOUTH EVERY DAY 90 tablet 3     econazole nitrate 1 % external cream Apply  topically daily (Patient taking differently: Apply topically daily Toes) 85 g 3     evolocumab (REPATHA) 140 MG/ML prefilled autoinjector Inject 1 mL (140 mg) Subcutaneous every 14 days 6 mL 3     ezetimibe (ZETIA) 10 MG tablet Take 1 tablet (10 mg) by mouth daily 90 tablet 3     levothyroxine (SYNTHROID/LEVOTHROID) 75 MCG tablet Take 1 tablet (75 mcg) by mouth daily 90 tablet 3     multivitamin, therapeutic (THERA-VIT) TABS tablet Take 1 tablet by mouth daily       nitroFURantoin macrocrystal-monohydrate (MACROBID) 100 MG capsule TAKE 1 CAPSULE BY MOUTH AFTER INTRCOURSE 30 capsule 3     rosuvastatin (CRESTOR) 20 MG tablet Take 1 tablet (20 mg) by mouth daily 90 tablet 3     senna-docusate (SENOKOT-S/PERICOLACE) 8.6-50 MG tablet Take 1-2 tablets by mouth 2 times daily 15 tablet 0     solifenacin (VESICARE) 5 MG tablet Take 1 tablet (5 mg) by mouth daily 90 tablet 3     Vitamin D3 (CHOLECALCIFEROL) 25 mcg (1000 units) tablet Take 2 tablets (50 mcg) by mouth daily 60 tablet 0              Physical Exam:   LMP 03/18/2005   Gen: NAD, pleasant and cooperative   HEENT: MMM  Cardio: regular pulse  Pulm: non-labored breathing in room air  Abd: benign  Ext: Mild lymphedema in LLE extending from thigh to foot, improved from previous visit.. Scar tissue and tightness noted at anterior left thigh. Positive Stemmer sign on left.   No erythema, warmth noted.  Well-healed surgical incision over left knee.  Right knee with no surrounding erythema, some increased swelling over knee joint.  Fluid shift test positive for effusion.  Neuro/MSK: Limited ROM w/ hip flexion, 0 degrees in full knee extension and knee flexion to 90 degrees. ROM improved at knee joint.        Labs/Imaging:  Lab Results   Component Value Date    WBC 5.6 12/08/2022    HGB 13.5 12/08/2022    HCT 42.5 12/08/2022    MCV 94 12/08/2022     12/08/2022     Lab Results   Component Value Date     12/08/2022    POTASSIUM 4.6 12/08/2022    CHLORIDE 104  12/08/2022    CO2 21 (L) 12/08/2022    GLC 82 12/08/2022     Lab Results   Component Value Date    GFRESTIMATED 82 12/08/2022    GFRESTBLACK >90 05/17/2021     Lab Results   Component Value Date    AST 27 12/08/2022    ALT 20 12/08/2022    ALKPHOS 84 12/08/2022    BILITOTAL 0.3 12/08/2022    BILICONJ 0.0 06/06/2006     Lab Results   Component Value Date    INR 1.01 09/27/2019     Lab Results   Component Value Date    BUN 20.3 12/08/2022    CR 0.77 12/08/2022              Assessment/Plan   Angeli Jacobson presents to clinic today for follow up reg her rehab needs.   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 Was last seen in clinic on 10/4/2022.   At today's visit, she continues to improve and make progress. Discussed with her and  on goal to continue follow ups in clinic. Agree will need further review from the orthopedic doctors on the R. Knee possible effusion and will leave the team to give further recommendations and management plan.     Recommendations:  7. Work-up: None today  8. Therapy/equipment/braces:   - Continue with the lymphedema wraps during the day. Follow previous PT recommendations for any further instructions on when to remove and keep on.  - Continue with HEP as recommended by therapies, would like to focus on those strengthening and stretching.   9. Medications: continue current medications  10. Interventions: None  today  11. Referral / follow up with other providers: Follow-up with orthopedic surgery for right knee effusion.  12. Follow up: RTC in 3 months       Patient was seen and discussed with Dr. Leeann Sharma.    Frankie Ruiz DO  Resident Physician PGY-3  Physical Medicine & Rehabilitation    Physician Attestation   I, Leeann Sharma MD, saw this patient and agree with the findings and plan of care as documented in the note.      Items personally reviewed/procedural attestation: vitals, labs and imaging and agree with the interpretation documented in the note.    Leeann Sharma MD       50 minutes spent on the date of the encounter doing chart review, history and exam, documentation and further activities as noted above.            Again, thank you for allowing me to participate in the care of your patient.        Sincerely,        Leeann Sharma MD

## 2023-01-10 NOTE — TELEPHONE ENCOUNTER
Last Written Prescription Date:  3/11/22  Last Fill Quantity: 90,  # refills: 3   Last office visit: 9/20/2022 with prescribing provider:  Dr. Prince.   Pt due in 03/2023 for follow up with Dr. Prince, virtual visit after ADRIAN/TBI and arterial duplex and fasting labs.      Future Office Visit:   Next 5 appointments (look out 90 days)    Jayro 10, 2023 11:00 AM  Return Visit with Leeann Sharma MD  [facility] ([facility] ) [address], [address]  [facility] [address]  [phone]         Prescription approved per [facility] Refill Protocol.    JANE Mcmahon, RN  [facility]  Office:  [phone] Fax: [phone]

## 2023-01-11 ENCOUNTER — TRANSFERRED RECORDS (OUTPATIENT)
Dept: HEALTH INFORMATION MANAGEMENT | Facility: CLINIC | Age: 73
End: 2023-01-11
Payer: COMMERCIAL

## 2023-01-12 RX ORDER — NITROFURANTOIN 25; 75 MG/1; MG/1
CAPSULE ORAL
Qty: 30 CAPSULE | Refills: 3 | Status: SHIPPED | OUTPATIENT
Start: 2023-01-12 | End: 2023-12-23

## 2023-01-13 NOTE — PATIENT INSTRUCTIONS
1.  Continue your home regimen for your leg lymphedema.  This is improved since her last visit.  2.  As discussed, follow-up with your orthopedic doctor regarding the knee swelling for assessment and further management.  3.  Follow-up with Dr. Sharma in clinic in 3 to 4 months.

## 2023-01-19 ENCOUNTER — TRANSFERRED RECORDS (OUTPATIENT)
Dept: HEALTH INFORMATION MANAGEMENT | Facility: CLINIC | Age: 73
End: 2023-01-19
Payer: COMMERCIAL

## 2023-01-20 ENCOUNTER — TELEPHONE (OUTPATIENT)
Dept: ONCOLOGY | Facility: CLINIC | Age: 73
End: 2023-01-20
Payer: COMMERCIAL

## 2023-01-20 NOTE — TELEPHONE ENCOUNTER
The patient was contacted to schedule the upcoming Dr. Balderas.  She has questions regarding the radiation and starting the Prolia and would like a call back.

## 2023-01-23 ENCOUNTER — MYC MEDICAL ADVICE (OUTPATIENT)
Dept: FAMILY MEDICINE | Facility: CLINIC | Age: 73
End: 2023-01-23
Payer: COMMERCIAL

## 2023-01-23 NOTE — TELEPHONE ENCOUNTER
Spoke with Angeli. She has an appt on 2/1 for Prolia. Will make sure is approved by then or adjust appt date.    Pt denies any questions about radiation therapy.    Osiris Santoyo RN

## 2023-01-23 NOTE — TELEPHONE ENCOUNTER
Left message with , Chris, requesting call back from [facility] so that her questions re: prolia and radiation can be answered.    Osiris Santoyo RN

## 2023-01-31 ENCOUNTER — LAB (OUTPATIENT)
Dept: INFUSION THERAPY | Facility: CLINIC | Age: 73
End: 2023-01-31
Attending: INTERNAL MEDICINE
Payer: COMMERCIAL

## 2023-01-31 DIAGNOSIS — M81.0 AGE-RELATED OSTEOPOROSIS WITHOUT CURRENT PATHOLOGICAL FRACTURE: Primary | ICD-10-CM

## 2023-01-31 LAB
CALCIUM SERPL-MCNC: 10.5 MG/DL (ref 8.8–10.2)
CREAT SERPL-MCNC: 0.75 MG/DL (ref 0.51–0.95)
GFR SERPL CREATININE-BSD FRML MDRD: 84 ML/MIN/1.73M2

## 2023-01-31 PROCEDURE — 82310 ASSAY OF CALCIUM: CPT | Performed by: INTERNAL MEDICINE

## 2023-01-31 PROCEDURE — 82565 ASSAY OF CREATININE: CPT | Performed by: INTERNAL MEDICINE

## 2023-01-31 PROCEDURE — 36415 COLL VENOUS BLD VENIPUNCTURE: CPT | Performed by: INTERNAL MEDICINE

## 2023-01-31 NOTE — PROGRESS NOTES
Medical Assistant Note:  Angelinydia Jacobson presents today for blood draw.    Patient seen by provider today: No.   present during visit today: Not Applicable.    Concerns: No Concerns.    Procedure:  Lab draw site: rac, Needle type: bf, Gauge: 23.    Post Assessment:  Labs drawn without difficulty: Yes.    Discharge Plan:  Departure Mode: Ambulatory.    Face to Face Time: 5 min.    Ana Winchester, CMA

## 2023-02-01 ENCOUNTER — TRANSFERRED RECORDS (OUTPATIENT)
Dept: HEALTH INFORMATION MANAGEMENT | Facility: CLINIC | Age: 73
End: 2023-02-01

## 2023-02-01 ENCOUNTER — LAB (OUTPATIENT)
Dept: INFUSION THERAPY | Facility: CLINIC | Age: 73
End: 2023-02-01
Attending: INTERNAL MEDICINE
Payer: COMMERCIAL

## 2023-02-01 DIAGNOSIS — M81.0 AGE-RELATED OSTEOPOROSIS WITHOUT CURRENT PATHOLOGICAL FRACTURE: Primary | ICD-10-CM

## 2023-02-01 PROCEDURE — 96372 THER/PROPH/DIAG INJ SC/IM: CPT | Performed by: INTERNAL MEDICINE

## 2023-02-01 PROCEDURE — 250N000011 HC RX IP 250 OP 636: Performed by: INTERNAL MEDICINE

## 2023-02-01 RX ADMIN — DENOSUMAB 60 MG: 60 INJECTION SUBCUTANEOUS at 10:34

## 2023-02-01 NOTE — PROGRESS NOTES
Infusion Nursing Note:  Angeli Jacobson presents today for prolia.    Patient seen by provider today: No   present during visit today: Not Applicable.    Note: N/A.    Intravenous Access:  No Intravenous access/labs at this visit.    Treatment Conditions:  Lab Results   Component Value Date     12/08/2022    POTASSIUM 4.6 12/08/2022    MAG 1.8 05/01/2018    CR 0.75 01/31/2023    LONG 10.5 (H) 01/31/2023    BILITOTAL 0.3 12/08/2022    ALBUMIN 4.3 12/08/2022    ALT 20 12/08/2022    AST 27 12/08/2022     Results reviewed, labs MET treatment parameters, ok to proceed with treatment.    Post Infusion Assessment:  Patient tolerated injection without incident.     Discharge Plan:   Discharge instructions reviewed with: Patient.  Patient and/or family verbalized understanding of discharge instructions and all questions answered.  Patient discharged in stable condition accompanied by: self.  Departure Mode: Ambulatory.      Ange Zepeda RN

## 2023-03-16 ENCOUNTER — TRANSFERRED RECORDS (OUTPATIENT)
Dept: HEALTH INFORMATION MANAGEMENT | Facility: CLINIC | Age: 73
End: 2023-03-16

## 2023-03-21 ENCOUNTER — TRANSFERRED RECORDS (OUTPATIENT)
Dept: HEALTH INFORMATION MANAGEMENT | Facility: CLINIC | Age: 73
End: 2023-03-21
Payer: COMMERCIAL

## 2023-03-23 ENCOUNTER — HOSPITAL ENCOUNTER (OUTPATIENT)
Dept: ULTRASOUND IMAGING | Facility: CLINIC | Age: 73
Discharge: HOME | End: 2023-03-23
Attending: INTERNAL MEDICINE
Payer: COMMERCIAL

## 2023-03-23 DIAGNOSIS — Z95.828 HISTORY OF ARTERIAL BYPASS OF LOWER EXTREMITY: ICD-10-CM

## 2023-03-23 DIAGNOSIS — I73.9 PAD (PERIPHERAL ARTERY DISEASE) (H): ICD-10-CM

## 2023-03-23 PROCEDURE — 93925 LOWER EXTREMITY STUDY: CPT

## 2023-03-23 PROCEDURE — 93924 LWR XTR VASC STDY BILAT: CPT

## 2023-03-24 ENCOUNTER — LAB (OUTPATIENT)
Dept: LAB | Facility: CLINIC | Age: 73
End: 2023-03-24
Attending: INTERNAL MEDICINE
Payer: COMMERCIAL

## 2023-03-24 DIAGNOSIS — E03.9 HYPOTHYROIDISM, UNSPECIFIED TYPE: ICD-10-CM

## 2023-03-24 DIAGNOSIS — E78.5 HYPERLIPIDEMIA LDL GOAL <70: ICD-10-CM

## 2023-03-24 LAB
ALBUMIN SERPL BCG-MCNC: 4.4 G/DL (ref 3.5–5.2)
ALP SERPL-CCNC: 76 U/L (ref 35–104)
ALT SERPL W P-5'-P-CCNC: 18 U/L (ref 10–35)
ANION GAP SERPL CALCULATED.3IONS-SCNC: 13 MMOL/L (ref 7–15)
AST SERPL W P-5'-P-CCNC: 34 U/L (ref 10–35)
BILIRUB SERPL-MCNC: 0.3 MG/DL
BUN SERPL-MCNC: 13.4 MG/DL (ref 8–23)
CALCIUM SERPL-MCNC: 9.2 MG/DL (ref 8.8–10.2)
CHLORIDE SERPL-SCNC: 106 MMOL/L (ref 98–107)
CREAT SERPL-MCNC: 0.64 MG/DL (ref 0.51–0.95)
DEPRECATED HCO3 PLAS-SCNC: 23 MMOL/L (ref 22–29)
GFR SERPL CREATININE-BSD FRML MDRD: >90 ML/MIN/1.73M2
GLUCOSE SERPL-MCNC: 91 MG/DL (ref 70–99)
POTASSIUM SERPL-SCNC: 4.3 MMOL/L (ref 3.4–5.3)
PROT SERPL-MCNC: 7.4 G/DL (ref 6.4–8.3)
SODIUM SERPL-SCNC: 142 MMOL/L (ref 136–145)
TSH SERPL DL<=0.005 MIU/L-ACNC: 0.67 UIU/ML (ref 0.3–4.2)

## 2023-03-24 PROCEDURE — 80053 COMPREHEN METABOLIC PANEL: CPT

## 2023-03-24 PROCEDURE — 36415 COLL VENOUS BLD VENIPUNCTURE: CPT

## 2023-03-24 PROCEDURE — 84443 ASSAY THYROID STIM HORMONE: CPT

## 2023-04-04 ENCOUNTER — OFFICE VISIT (OUTPATIENT)
Dept: SURGERY | Facility: CLINIC | Age: 73
End: 2023-04-04
Payer: COMMERCIAL

## 2023-04-04 VITALS
SYSTOLIC BLOOD PRESSURE: 122 MMHG | RESPIRATION RATE: 16 BRPM | HEIGHT: 66 IN | HEART RATE: 78 BPM | BODY MASS INDEX: 24.59 KG/M2 | OXYGEN SATURATION: 98 % | WEIGHT: 153 LBS | DIASTOLIC BLOOD PRESSURE: 70 MMHG

## 2023-04-04 DIAGNOSIS — E03.9 HYPOTHYROIDISM, UNSPECIFIED TYPE: ICD-10-CM

## 2023-04-04 DIAGNOSIS — C49.9 MYXOID LIPOSARCOMA (H): ICD-10-CM

## 2023-04-04 DIAGNOSIS — Z95.828 HISTORY OF ARTERIAL BYPASS OF LOWER EXTREMITY: ICD-10-CM

## 2023-04-04 DIAGNOSIS — R93.1 AGATSTON CORONARY ARTERY CALCIUM SCORE GREATER THAN 400: ICD-10-CM

## 2023-04-04 DIAGNOSIS — I89.0 LYMPHEDEMA OF LEFT LEG: Primary | ICD-10-CM

## 2023-04-04 DIAGNOSIS — E78.5 HYPERLIPIDEMIA LDL GOAL <70: ICD-10-CM

## 2023-04-04 PROCEDURE — 99215 OFFICE O/P EST HI 40 MIN: CPT | Performed by: INTERNAL MEDICINE

## 2023-04-04 RX ORDER — EZETIMIBE 10 MG/1
10 TABLET ORAL DAILY
Qty: 90 TABLET | Refills: 3 | Status: SHIPPED | OUTPATIENT
Start: 2023-04-04 | End: 2024-02-28

## 2023-04-04 RX ORDER — LEVOTHYROXINE SODIUM 75 UG/1
75 TABLET ORAL DAILY
Qty: 90 TABLET | Refills: 3 | Status: SHIPPED | OUTPATIENT
Start: 2023-04-04 | End: 2024-05-03

## 2023-04-04 RX ORDER — CLOPIDOGREL BISULFATE 75 MG/1
75 TABLET ORAL DAILY
Qty: 90 TABLET | Refills: 3 | Status: SHIPPED | OUTPATIENT
Start: 2023-04-04 | End: 2024-03-22

## 2023-04-04 NOTE — PROGRESS NOTES
SUBJECTIVE:  CC:  Follow-up visit  Multiple issues   Review of recent labs and imaging studies   Med refills   Tolerating crestor, zetia and PCSK 9 inhibitor  Started lymphedema therapy   Underwent  left leg surgery in may 2022  History of left lower extremity arterial bypass for PAD  She broke her left hip in April 2019 while in [place] underwent ORIF followed by left total hip replacement on October 4, 2019 at [facility] [place]   Recently DX breast cancer underwent Sx and XRT seeing Oncologist       History of present illness:   Angeli Jacobson is a 72 year old very pleasant female with past medical history of left thigh/groin sarcoma underwent surgery with lymphadenectomy and radiation therapy in the year 2000 at [facility] followed by she developed peripheral arterial disease underwent  Redo left common femoral to a bony popliteal artery bypass with 6 mm PTFE graft in January 2019 and since then reasonably doing well as for as the PAD concerned visited [place] in April and she fell down broke her left hip underwent ORIF over there.   She has a long-standing history of lymphedema after sarcoma surgery and radiation therapy.  Using compression stockings etc..  She was also evaluated extensively at lymphedema clinic and currently using pump  On October 4, 2019 she underwent left total hip replacement .  Postoperatively she was having back spasms due to positional discomfort and treated with Flexeril and they are better now.  She has been taking both Plavix and aspirin.    November 2019 , 2021 , march 2022  left lower extremity arterial duplex and ADRIAN Dopplers were good  She is able to walk approximately 10 blocks daily  In march  underwent fasting lipids excellent response with combination of statin , zetia and PCSK 9   Arterial duplex results as delineated below and ADRIAN with exercise normal   Reviewed recent imaging studies, laboratory data in the epic  Wearing compression  stockings  HISTORIES:  PROBLEM LIST:   Patient Active Problem List   Diagnosis     MULTINODUL GOITER     Hearing loss     Hyperlipidemia LDL goal <70     Osteoporosis     Impaired fasting glucose     Lymphedema of left leg     Tubular adenoma     Chronic pain of left knee     Vertigo     Myxoid liposarcoma (HCC)     PAD (peripheral artery disease) (H)     Vascular graft occlusion (H)     Femoral-popliteal bypass graft occlusion, left (H)     History of sarcoma     S/P ORIF (open reduction internal fixation) fracture     Hip pain, left     Postural urinary incontinence     Personal history of urinary tract infection     OAB (overactive bladder)     Myalgia of pelvic floor     Lesion of bladder     S/P total hip arthroplasty     Acute pain of left knee     Closed fracture of left tibia and fibula, initial encounter     Closed displaced fracture of left patella, unspecified fracture morphology, initial encounter     Other specified injury of left quadriceps muscle, fascia and tendon, initial encounter     Elevated coronary artery calcium score=7/1/21      Arthrofibrosis of knee joint, left     Hypothyroidism, unspecified type     Malignant neoplasm of upper-outer quadrant of right breast in female, estrogen receptor positive (H)     PAST MEDICAL HISTORY:  Past Medical History:   Diagnosis Date     * * * SBE PROPHYLAXIS * * * 1998    Amox 500mg, take 4 tabs one hour prior to procedure.Takes this because of lymphedema secondary from leg surgey     Antiplatelet or antithrombotic long-term use      Arrhythmia      Central serous retinopathy 2001    Resolved 9/2001     CHRONIC NECK PAIN 1995     Depressive disorder      Depressive disorder, not elsewhere classified 2001     Elevated coronary artery calcium score=7/1/21 08/03/2021     Elevated coronary artery calcium score=7/1/21 08/03/2021     History of blood transfusion 04/2019 12/2020    during left hip pinning, during surgery for patella     Lateral epicondylitis       MIXED HYPERLIPIDEMIA, LDL GOAL <160 1998    LDL goal < 160     Motion sickness      MYXOID LIPOSARCOMA 2000    Left thigh, S/P excision, radiation  [facility]     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    CHRONIC LEFT THIGH LYMPHEDEMA     Nontoxic multinodular goiter 2005    needs yearly US     Osteoporosis, unspecified 2001     Other chronic pain     fx ribs left      Other lymphedema 2000    left thigh, gets regular PT for this     Overactive bladder      PAD (peripheral artery disease) (H) 04/20/2018     PONV (postoperative nausea and vomiting)      SHINGLES 2001     Sprain of lumbosacral (joint) (ligament) 1995    right     Unspecified hearing loss 1998    chronic tinnitus     Unspecified tinnitus 1998     PAST SURGICAL HISTORY:  Past Surgical History:   Procedure Laterality Date     ARTHROPLASTY HIP Left 10/4/2019    Procedure: Removal of left femoral hardware with a conversion to left total hip arthroplasty using a Biomet Annalisa femoral stem with an OsseoTi acetabular shell and a dual mobility bearing surface;  Surgeon: Spencer Celeste MD;  Location:  OR     BIOPSY  April 2000     BIOPSY BREAST SEED LOCALIZATION Right 12/14/2022    Procedure: right breast tag localized lumpectomy;  Surgeon: Shelly Carr MD;  Location:  OR     BIOPSY NODE SENTINEL Right 12/14/2022    Procedure: with right sentinel lymph node biopsy;  Surgeon: Shelly Carr MD;  Location:  OR     BYPASS GRAFT FEMOROPOPLITEAL Left 1/21/2019    Procedure: LEFT FEMORAL TO ABOVE KNEE POPLITEAL  BYPASS WITH POLYTETRAFLUOROETHYLENE GRAFT;  Surgeon: Shade Owens MD;  Location:  OR     COLONOSCOPY N/A 2/5/2016    Procedure: COMBINED COLONOSCOPY, SINGLE OR MULTIPLE BIOPSY/POLYPECTOMY BY BIOPSY;  Surgeon: Varun Stanley MD, MD;  Location:  GI     COLONOSCOPY N/A 12/10/2021    Procedure: COLONOSCOPY, WITH POLYPECTOMIES  USING COLD  SNARE,   CLIP APPLIED X2;  Surgeon: Dayton Luna MD;  Location:  GI     CYSTOSCOPY        EXCISION MALIG LESION>1.25CM  5/2000    Myxoid Liposarcoma       EXPLORE GROIN Right 5/1/2018    Procedure: EXPLORE GROIN;  EMERGENCY RIGHT FEMORAL EXPLORATION WITH FEMORAL ARTERY REPAIR.    EBL: 50mL;  Surgeon: Shade Owens MD;  Location: [facility] OR     HC COLP CERVIX/UPPER VAGINA  07/1997    Negative     HC DILATION/CURETTAGE DIAG/THER NON OB  02/1997    Post menopausal bleeding on HRT, negative     HIP SURGERY Left 04/13/2019     IR ANGIOGRAM THROUGH CATHETER FOLLOW UP  12/20/2018     IR ANGIOGRAM THROUGH CATHETER FOLLOW UP  12/21/2018     IR LOWER EXTREMITY ANGIOGRAM LEFT  12/19/2018     OPEN REDUCTION INTERNAL FIXATION PATELLA Left 12/21/2020    Procedure: Left partial patella fracture excision with advancement of the quadriceps tendon;  Surgeon: Stephen Rhodes MD;  Location: [facility] OR     OPEN REDUCTION INTERNAL FIXATION RODDING INTRAMEDULLARY TIBIA Left 12/21/2020    Procedure: Open reduction intramedullary nailing of left tibia fracture;  Surgeon: Stephen Rhodes MD;  Location: [facility] OR     REMOVE HARDWARE KNEE Left 5/31/2022    Procedure: Left knee patella hardware removal;  Surgeon: Spencer Celeste MD;  Location: [facility] OR     REPAIR TENDON QUADRICEPS Left 7/28/2021    Procedure: Left knee quadricepsplasty with intraoperative patellar fracture requiring open reduction and internal fixation of the patella;  Surgeon: Spencer Celeste MD;  Location: [facility] OR     REPAIR TENDON QUADRICEPS Left 5/31/2022    Procedure: Open left knee VY quadricepsplasty with hardware removal and allograft;  Surgeon: Spencer Celeste MD;  Location:  OR     VASCULAR SURGERY  04/30/2018    Left SFA stent in bypass graft     Z APPENDECTOMY      Appendectomy     ZC NONSPECIFIC PROCEDURE  04/2000    Open Bi[facility] Thigh [facility]     [facility]    due 2010     CURRENT MEDICATIONS:  Current Outpatient Medications   Medication Sig Dispense Refill     aspirin (ASA) 81  MG chewable tablet Take 81 mg by mouth daily       calcium carbonate 600 mg-vitamin D 400 units (CALTRATE) 600-400 MG-UNIT per tablet Take 1 chew tab by mouth daily       clopidogrel (PLAVIX) 75 MG tablet TAKE 1 TABLET BY MOUTH EVERY DAY 90 tablet 3     econazole nitrate 1 % external cream Apply topically daily (Patient taking differently: Apply topically daily Toes) 85 g 3     evolocumab (REPATHA) 140 MG/ML prefilled autoinjector Inject 1 mL (140 mg) Subcutaneous every 14 days 6 mL 3     ezetimibe (ZETIA) 10 MG tablet Take 1 tablet (10 mg) by mouth daily 90 tablet 3     levothyroxine (SYNTHROID/LEVOTHROID) 75 MCG tablet TAKE 1 TABLET BY MOUTH EVERY DAY 90 tablet 0     multivitamin, therapeutic (THERA-VIT) TABS tablet Take 1 tablet by mouth daily       nitroFURantoin macrocrystal-monohydrate (MACROBID) 100 MG capsule TAKE 1 CAPSULE BY MOUTH AFTER INTRCOURSE 30 capsule 3     rosuvastatin (CRESTOR) 20 MG tablet Take 1 tablet (20 mg) by mouth daily 90 tablet 3     solifenacin (VESICARE) 5 MG tablet Take 1 tablet (5 mg) by mouth daily 90 tablet 3     anastrozole (ARIMIDEX) 1 MG tablet Take 1 tablet (1 mg) by mouth daily 90 tablet 3     senna-docusate (SENOKOT-S/PERICOLACE) 8.6-50 MG tablet Take 1-2 tablets by mouth 2 times daily 15 tablet 0     Vitamin D3 (CHOLECALCIFEROL) 25 mcg (1000 units) tablet Take 2 tablets (50 mcg) by mouth daily 60 tablet 0     ALLERGIES:  Allergies   Allergen Reactions     Celexa [Citalopram Hydrobromide]      Decreased libido     SOCIAL HISTORY:  Social History     Socioeconomic History     Marital status:      Spouse name: Chris     Number of children: 3     Years of education: 14     Highest education level: Associate degree: academic program   Occupational History     Occupation: at home     Employer: NONE    Tobacco Use     Smoking status: Never     Passive exposure: Never     Smokeless tobacco: Never   Vaping Use     Vaping status: Never Used   Substance and Sexual Activity      Alcohol use: Never     Drug use: Never     Sexual activity: Yes     Partners: Male     Birth control/protection: Male Surgical     Comment:  had a vasectomy   Other Topics Concern     Parent/sibling w/ CABG, MI or angioplasty before 65F 55M? No   Social History Narrative     Not on file     Social Determinants of Health     Financial Resource Strain: Low Risk  (12/16/2021)    Overall Financial Resource Strain (CARDIA)      Difficulty of Paying Living Expenses: Not hard at all   Food Insecurity: No Food Insecurity (12/16/2021)    Hunger Vital Sign      Worried About Running Out of Food in the Last Year: Never true      Ran Out of Food in the Last Year: Never true   Transportation Needs: No Transportation Needs (12/16/2021)    PRAPARE - Transportation      Lack of Transportation (Medical): No      Lack of Transportation (Non-Medical): No   Physical Activity: Inactive (12/16/2021)    Exercise Vital Sign      Days of Exercise per Week: 0 days      Minutes of Exercise per Session: 0 min   Stress: Stress Concern Present (12/16/2021)    [employer] - Occupational Stress Questionnaire      Feeling of Stress : To some extent   Social Connections: Moderately Integrated (12/16/2021)    Social Connection and Isolation Panel [NHANES]      Frequency of Communication with Friends and Family: Never      Frequency of Social Gatherings with Friends and Family: Never      Attends Baptism Services: More than 4 times per year      Active Member of Clubs or Organizations: Yes      Attends Club or Organization Meetings: Not on file      Marital Status:    Intimate Partner Violence: Not on file   Housing Stability: Low Risk  (12/16/2021)    Housing Stability Vital Sign      Unable to Pay for Housing in the Last Year: No      Number of Places Lived in the Last Year: 1      Unstable Housing in the Last Year: No     FAMILY HISTORY:  Family History   Problem Relation Age of Onset     C.A.D. Father   "       MI 57     Alcohol/Drug Father         etoh     Coronary Artery Disease Father      Obesity Mother      Osteoporosis Mother      Colon Cancer Brother      Hyperlipidemia Son      Anxiety Disorder Son      Hyperlipidemia Son         very high, experimental drug     C.A.D. Paternal Grandmother         ascvd     Diabetes Maternal Grandmother      Cancer Maternal Grandmother      C.A.D. Paternal Uncle         Mi  age 48     Cancer Maternal Aunt         pancreatic CA     Hyperlipidemia Son      Hyperlipidemia Cousin      REVIEW OF SYSTEMS:  CONSTITUTIONAL:no malaise, fatigue, or other general symptoms  EYES: no subjective changes in visual acuity, no photophobia  ENT/MOUTH: no complaints of rhinorrhea, nasal congestion, sore throat, hearing changes  RESP:no SOB  CV: no c/o exertional chest pressure or SALDANA  GI: No abdominal pain, constipation, change in bowel movements, nausea, pyrosis, BRBPR  :no polyuria or polydipsia, no dysuria, no gross hematuria  MUSCULOSKELATAL:no arthalgias or myalgias  INTEGUMENTARY/SKIN: no pruritis, rashes, or moles with recent change in size, shape, or pigmentation  NEURO: no gross sensory or motor symptoms, no dizziness, no confusion  ENDOCRINE: no polyuria or polydipsia, no heat or cold intolerance  HEME/ALLERGY/IMMUNE: no fevers, chills, night sweats, or unwanted weight loss  PSYCHIATRIC: no depression, anxiety, or internal stimuli  EXAM:  /70   Pulse 78   Resp 16   Ht 1.676 m (5' 6\")   Wt 69.4 kg (153 lb)   LMP 2005   SpO2 98%   BMI 24.69 kg/m    BMI: Body mass index is 24.69 kg/m .  GENERAL APPEARANCE:  Pleasant  Healthy appearing male , alert, active, no distress cooperative.  EXAM:  EYES: clear conjunctiva, no cataracts, no obvious fundoscopic abnormalities  HENT: oropharynx, nares, and TMs are WNL  NECK: no JVD, thyromegaly or lymphadenopathy, no cervical bruits  RESP: clear to auscultation without rales, wheezes, or rhonchi  CV: RRR, no murmurs, " gallops, or rubs  LYMPH: no cervical , axillary, or inguinal lymphadenopathy appreciated  GI: NABS, ND/NT, no masses or organomegally appreciated  MS: Left lower extremity lymphedema slightly improved compared to before per report , wrapping in place did not open   Palpable peripheral pulses bilaterally symmetrical  SKIN: no nevi clinically suspicious for malignancy are noted  NEURO: CN II-XII intact, no localizing sensory or motor abnoramlities noted, DTRs symmetrical bilaterally  PSYCH: Mental status exam reveals the pt to have normal mood and affect. There is no disorder of thought form or content. There is no response to internal stimuli. There is no suicidal or homicidal ideation.    US ADRIAN DOPPLER WITH EXERCISE BILATERAL  9/14/2020 2:12 PM     CLINICAL HISTORY: Status post redo of a left femoral to AK popliteal  artery PTFE bypass graft. Hx PAD; PAD (peripheral artery disease) (H)     COMPARISON: 12/12/2018.     ADRIAN FINDINGS:      RIGHT(mmHg)  Brachial: 133  Ankle (PT): 146; Index 1.10  Ankle (DP): 163; Index 1.23     LEFT (mmHg)  Brachial: 132  Ankle (PT): 130; Index 0.98  Ankle (DP): 147; Index 1.11     The patient was exercised on a treadmill at 1.5 mph at a 10% incline  for 5 minutes total. The patient was asymptomatic.     Resting and immediate postexercise ABIs are 1.23 and 1.22 on the  right.     Resting and immediate postexercise ABIs are 1.10 and 1.08 on the left.     WAVEFORMS: The dorsalis pedis and posterior tibial arteries show  normal triphasic vascular waveforms bilaterally.                                                                      IMPRESSION:  1. RIGHT LOWER EXTREMITY: No significant interval change. ADRIAN at rest  is normal with a normal response to exercise. These findings would not  be consistent with symptoms of arterial claudication.     2. LEFT LOWER EXTREMITY: No significant interval change. ADRIAN at rest  is normal with a normal response to exercise. These findings would not  be  consistent with symptoms of arterial claudication.     CLAY BRUNNER MD    EXAM: DUPLEX ARTERIAL ULTRASOUND OF THE LEFT LOWER EXTREMITY     INDICATION: Peripheral arterial disease. Revision left femoral to  above-knee synthetic arterial bypass on 1/21/2019.     COMPARISON: None.     TECHNIQUE: Duplex imaging is performed utilizing gray-scale,  two-dimensional images, and color-flow imaging. Doppler waveform  analysis and spectral Doppler imaging is also performed.     DUPLEX ARTERIAL ULTRASOUND FINDINGS:      VELOCITIES (CENTIMETERS PER SECOND)  Inflow: 164  Proximal graft anastomosis: 162  Proximal graft: 86  Mid graft: 75  Distal graft: 82  Distal anastomosis: 60  Native postanastomotic artery: 76                                                                IMPRESSION:  No significant interval change. The left femoral to popliteal arterial  bypass is widely patent with brisk multiphasic vascular waveforms  throughout and no evidence for hemodynamic significant lesion. The  diameter of the graft measures 5.6-6.2 mm.    Reviewed recent labs with patient and her      [facility]  DATE: 3/28/2022     EXAM: RESTING AND POSTEXERCISE ANKLE BRACHIAL INDICES (ABIs)     INDICATION: Peripheral arterial disease, history of femoropopliteal  bypass graft, professional disease     COMPARISON: 9/14/2020     ADRIAN FINDINGS:      Pressure measurements in mmHg     RIGHT     Brachial: 138  Ankle (PT): 149, 1.06  Ankle (DP): 169, 1.21  Digit: 100, 0.71     LEFT     Brachial: 140  Ankle (PT): 143, 1.02  Ankle (DP): 149, 1.06  Digit: 104, 0.74     The patient was exercised on a treadmill at 1.5 mph at a 0% incline  for 5 minutes total. No symptoms during exercise. Unable to walk and  inclined due to restricted motion of the left knee.     Resting and immediate postexercise ABIs are 1.21 and 1.23 on the  right.     Resting and immediate postexercise ABIs are 1.06 and 1.11 on the left.     WAVEFORMS: Normal waveforms  bilaterally.                                                                      IMPRESSION:  1. RIGHT LOWER EXTREMITY: ADRIAN at rest is normal with a normal response  to exercise. TBI is also within normal limits.     2. LEFT LOWER EXTREMITY: ADRIAN at rest is normal with a normal response  to exercise. TBI is also within normal limits.     SANDIP SANTOS MD         [facility]  DATE: 3/28/2022     EXAM: LEFT LOWER EXTREMITY ARTERIAL DUPLEX     INDICATION: Peripheral marginal disease, left femoropopliteal bypass  graft      TECHNIQUE: Duplex imaging was performed utilizing gray-scale,  compression, augmentation as appropriate, color-flow, Doppler waveform  analysis, and spectral Doppler imaging.     COMPARISON: 9/14/2020.     FINDINGS:      The left femoropopliteal bypass graft appears widely patent with  normal waveforms throughout.                                                                      IMPRESSION:   1.  Widely patent left femoropopliteal bypass graft.     SANDIP SANTOS MD     ARTERIAL DUPLEX ULTRASOUND BILATERAL LOWER EXTREMITY  3/23/2023 2:13  PM      HISTORY: History of arterial bypass of lower extremity. PAD  (peripheral artery disease) (H).     COMPARISON: 3/28/2022     FINDINGS: Color Doppler and spectral waveform analysis was performed  throughout the arteries of the right lower extremity and left femoral  to above-knee popliteal artery bypass graft.     Right: The right common femoral, profunda femoral, superficial  femoral, popliteal, anterior tibial, posterior tibial and peroneal  arteries are patent with triphasic waveforms.     Left: The left femoral to above-knee popliteal artery bypass is patent  with diameters ranging between 2.8 and 5.7 mm. Inflow and outflow  arteries are patent. Waveforms are triphasic throughout. Focal area of  stenosis at the proximal graft with a diameter of 2.8 mm, previously  6.0 mm. No associated elevated velocities.                                                                       IMPRESSION:   1. Patent arteries throughout the right lower extremity. Three-vessel  runoff.  2. Patent left femoral to above-knee popliteal artery bypass; however,  there is visual stenosis with a diameter of 2.8 mm, previously 6 mm.  No associated elevated velocities.     GEORGE HOLLEY, DO            A/P:  1. Lymphedema of left leg  She was seen and evaluated recently lymphedema therapist continue the same plan elevate the leg,  lose weight use compression stockings and pump therapy     2. History of arterial bypass of Left lower extremity  Currently taking dual antiplatelet agent baby aspirin with the Plavix tolerating without any problems and she is tolerating crestor 20 mg daily along with Zetia 10 mg daily and PCSK 9 inhibitor   Excellent lipids  Therapeutic lifestyle modification suggested    She is able to walk 10 blocks continue to walk as much as she can and follow the diet and exercise plan  ADRIAN looks good but Art duplex visual stenosis 2.8 mm ( previously 6.0 mm)   she will be seeing Dr. Roman obregon     3. 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  Planning for leg surgery in May 2022 , may need to hold plavix 5 days before and continue asa      4. Hyperlipidemia LDL goal <70  Well controlled with statin, PCSK 9 and zetia etc  Repeat FLP, CMP , TSH in 6 months then visit  5. Hypothyroidism, unspecified type  Clinically euthyroid and recent thyroid function tests are normal continue the same and take medication as advised  40 minutes spent on the date of the encounter doing chart review, history, exam, documentation and review of recent imaging studies and compared to previous imaging studies she and her  had a lot of questions all of them were answered AVS with written instructions given      Copy of this note  to primary care provider.  AVS with written instructions given      Tiesha Prince MD, FAWARREN, [facility],  FNLA  Vascular Medicine  Clinical Lipidologist

## 2023-04-04 NOTE — PROGRESS NOTES
[facility]    Hematology/Oncology Established Patient Note      Today's Date: 4/10/2023    Reason for follow-up:  Right breast cancer.    HISTORY OF PRESENT ILLNESS: Angeli Jacobson is a 72 year old female with history of left thigh liposarcoma status post excision in 2000 with subsequent left leg lymphedema and peripheral arterial disease status post redo left common femoral to popliteal artery bypass in January 2019, who presents with the following oncologic history:  1.  11/7/2022: Screening mammogram showed asymmetry in the right breast at 12:00, retroareolar far posterior.  Left breast negative.  2.  11/15/2022: Diagnostic right mammogram showed asymmetry in the posterior right breast, 8 cm from nipple.  Ultrasound of right breast at 12:00, 2 cm from nipple showed a small benign cyst but no definite sonographic correlate for the mammographic asymmetry.  3.  11/16/2022: Stereotactic guided right breast needle biopsy of 8 mm lesion at 3:00, 8 cm from the nipple showed grade 2 invasive ductal carcinoma, ER positive at %, SD positive at 60-70%, HER2 IHC = 2+ equivocal, HER2/compa FISH negative.  4. 12/14/2022: Underwent right breast lumpectomy with right axillary sentinel lymph node excision with Dr. Shelly Carr.  Pathology showed no residual invasive malignancy; 2 microscopic foci of DCIS, grade 2, largest measuring 1.2 mm; margins negative; total 2 right axillary lymph nodes negative.  5. 3/21/2023: Completed adjuvant radiation therapy to the right breast.     INTERVAL HISTORY:  Angeli reports she has not yet started anastrozole. She feels she has healed well from radiation.      REVIEW OF SYSTEMS:   14 point ROS was reviewed and is negative other than as noted above in HPI.       HOME MEDICATIONS:  Current Outpatient Medications   Medication Sig Dispense Refill     anastrozole (ARIMIDEX) 1 MG tablet Take 1 tablet (1 mg) by mouth daily 90 tablet 3     aspirin (ASA) 81 MG  chewable tablet Take 81 mg by mouth daily       calcium carbonate 600 mg-vitamin D 400 units (CALTRATE) 600-400 MG-UNIT per tablet Take 1 chew tab by mouth daily       clopidogrel (PLAVIX) 75 MG tablet Take 1 tablet (75 mg) by mouth daily 90 tablet 3     econazole nitrate 1 % external cream Apply topically daily (Patient taking differently: Apply topically daily Toes) 85 g 3     evolocumab (REPATHA) 140 MG/ML prefilled autoinjector Inject 1 mL (140 mg) Subcutaneous every 14 days 6 mL 3     ezetimibe (ZETIA) 10 MG tablet Take 1 tablet (10 mg) by mouth daily 90 tablet 3     levothyroxine (SYNTHROID/LEVOTHROID) 75 MCG tablet Take 1 tablet (75 mcg) by mouth daily 90 tablet 3     multivitamin, therapeutic (THERA-VIT) TABS tablet Take 1 tablet by mouth daily       nitroFURantoin macrocrystal-monohydrate (MACROBID) 100 MG capsule TAKE 1 CAPSULE BY MOUTH AFTER INTRCOURSE 30 capsule 3     rosuvastatin (CRESTOR) 20 MG tablet Take 1 tablet (20 mg) by mouth daily 90 tablet 3     solifenacin (VESICARE) 5 MG tablet Take 1 tablet (5 mg) by mouth daily 90 tablet 3     Vitamin D3 (CHOLECALCIFEROL) 25 mcg (1000 units) tablet Take 2 tablets (50 mcg) by mouth daily 60 tablet 0         ALLERGIES:  Allergies   Allergen Reactions     Celexa [Citalopram Hydrobromide]      Decreased libido         PAST MEDICAL HISTORY:  Past Medical History:   Diagnosis Date     * * * SBE PROPHYLAXIS * * * 1998    Amox 500mg, take 4 tabs one hour prior to procedure.Takes this because of lymphedema secondary from leg surgey     Antiplatelet or antithrombotic long-term use      Arrhythmia      Central serous retinopathy 2001    Resolved 9/2001     CHRONIC NECK PAIN 1995     Depressive disorder      Depressive disorder, not elsewhere classified 2001     Elevated coronary artery calcium score=7/1/21 08/03/2021     Elevated coronary artery calcium score=7/1/21 08/03/2021     History of blood transfusion 04/2019 12/2020    during left hip pinning, during  surgery for patella     Lateral epicondylitis      MIXED HYPERLIPIDEMIA, LDL GOAL <160     LDL goal < 160     Motion sickness      MYXOID LIPOSARCOMA 2000    Left thigh, S/P excision, radiation  [facility]     Myxoid liposarcoma (HCC) 2004    CHRONIC LEFT THIGH LYMPHEDEMA     Nontoxic multinodular goiter 2005    needs yearly US     Osteoporosis, unspecified      Other chronic pain     fx ribs left      Other lymphedema 2000    left thigh, gets regular PT for this     Overactive bladder      PAD (peripheral artery disease) (H) 2018     PONV (postoperative nausea and vomiting)      SHINGLES      Sprain of lumbosacral (joint) (ligament)     right     Unspecified hearing loss     chronic tinnitus     Unspecified tinnitus      Gynecologic history:  Age of menarche at 11, , age of first pregnancy at 29, 5 years of OCP use, no HRT or fertility treatment.    PAST SURGICAL HISTORY:  Past Surgical History:   Procedure Laterality Date     ARTHROPLASTY HIP Left 10/4/2019    Procedure: Removal of left femoral hardware with a conversion to left total hip arthroplasty using a Biomet Annalisa femoral stem with an OsseoTi acetabular shell and a dual mobility bearing surface;  Surgeon: Spencer Celeste MD;  Location:  OR     BIOPSY  2000     BIOPSY BREAST SEED LOCALIZATION Right 2022    Procedure: right breast tag localized lumpectomy;  Surgeon: Shelly Carr MD;  Location:  OR     BIOPSY NODE SENTINEL Right 2022    Procedure: with right sentinel lymph node biopsy;  Surgeon: Shelly Carr MD;  Location:  OR     BYPASS GRAFT FEMOROPOPLITEAL Left 2019    Procedure: LEFT FEMORAL TO ABOVE KNEE POPLITEAL  BYPASS WITH POLYTETRAFLUOROETHYLENE GRAFT;  Surgeon: Shade Owens MD;  Location:  OR     COLONOSCOPY N/A 2016    Procedure: COMBINED COLONOSCOPY, SINGLE OR MULTIPLE BIOPSY/POLYPECTOMY BY BIOPSY;  Surgeon: Varun Stanley MD, MD;  Location:   GI     COLONOSCOPY N/A 12/10/2021    Procedure: COLONOSCOPY, WITH POLYPECTOMIES  USING COLD  SNARE,   CLIP APPLIED X2;  Surgeon: Dayton Luna MD;  Location:  GI     CYSTOSCOPY       EXCISION MALIG LESION>1.25CM  5/2000    Myxoid Liposarcoma       EXPLORE GROIN Right 5/1/2018    Procedure: EXPLORE GROIN;  EMERGENCY RIGHT FEMORAL EXPLORATION WITH FEMORAL ARTERY REPAIR.    EBL: 50mL;  Surgeon: Shade Owens MD;  Location: [facility] OR     HC COLP CERVIX/UPPER VAGINA  07/1997    Negative     HC DILATION/CURETTAGE DIAG/THER NON OB  02/1997    Post menopausal bleeding on HRT, negative     HIP SURGERY Left 04/13/2019     IR ANGIOGRAM THROUGH CATHETER FOLLOW UP  12/20/2018     IR ANGIOGRAM THROUGH CATHETER FOLLOW UP  12/21/2018     IR LOWER EXTREMITY ANGIOGRAM LEFT  12/19/2018     OPEN REDUCTION INTERNAL FIXATION PATELLA Left 12/21/2020    Procedure: Left partial patella fracture excision with advancement of the quadriceps tendon;  Surgeon: Stephen Rhodes MD;  Location:  OR     OPEN REDUCTION INTERNAL FIXATION RODDING INTRAMEDULLARY TIBIA Left 12/21/2020    Procedure: Open reduction intramedullary nailing of left tibia fracture;  Surgeon: Stephen Rhodes MD;  Location:  OR     REMOVE HARDWARE KNEE Left 5/31/2022    Procedure: Left knee patella hardware removal;  Surgeon: Spencer Celeste MD;  Location:  OR     REPAIR TENDON QUADRICEPS Left 7/28/2021    Procedure: Left knee quadricepsplasty with intraoperative patellar fracture requiring open reduction and internal fixation of the patella;  Surgeon: Spencer Celeste MD;  Location: [facility] OR     REPAIR TENDON QUADRICEPS Left 5/31/2022    Procedure: Open left knee VY quadricepsplasty with hardware removal and allograft;  Surgeon: Spencer Celeste MD;  Location:  OR     VASCULAR SURGERY  04/30/2018    Left SFA stent in bypass graft     ZZC APPENDECTOMY      Appendectomy     ZZC NONSPECIFIC PROCEDURE  04/2000    Open Biopsy Left Thigh  Liposarcoma     ZZHC COLONOSCOPY THRU STOMA, DIAGNOSTIC  2006    due 2010         SOCIAL HISTORY:  Social History     Socioeconomic History     Marital status:      Spouse name: Chris     Number of children: 3     Years of education: 14     Highest education level: Associate degree: academic program   Occupational History     Occupation: at home     Employer: NONE    Tobacco Use     Smoking status: Never     Passive exposure: Never     Smokeless tobacco: Never   Vaping Use     Vaping status: Never Used   Substance and Sexual Activity     Alcohol use: Never     Drug use: Never     Sexual activity: Yes     Partners: Male     Birth control/protection: Male Surgical     Comment:  had a vasectomy   Other Topics Concern     Parent/sibling w/ CABG, MI or angioplasty before 65F 55M? No   Social History Narrative     Not on file     Social Determinants of Health     Financial Resource Strain: Low Risk  (12/16/2021)    Overall Financial Resource Strain (CARDIA)      Difficulty of Paying Living Expenses: Not hard at all   Food Insecurity: No Food Insecurity (12/16/2021)    Hunger Vital Sign      Worried About Running Out of Food in the Last Year: Never true      Ran Out of Food in the Last Year: Never true   Transportation Needs: No Transportation Needs (12/16/2021)    PRAPARE - Transportation      Lack of Transportation (Medical): No      Lack of Transportation (Non-Medical): No   Physical Activity: Inactive (12/16/2021)    Exercise Vital Sign      Days of Exercise per Week: 0 days      Minutes of Exercise per Session: 0 min   Stress: Stress Concern Present (12/16/2021)    [employer] - Occupational Stress Questionnaire      Feeling of Stress : To some extent   Social Connections: Moderately Integrated (12/16/2021)    Social Connection and Isolation Panel [NHANES]      Frequency of Communication with Friends and Family: Never      Frequency of Social Gatherings with Friends and  "Family: Never      Attends Rastafari Services: More than 4 times per year      Active Member of Clubs or Organizations: Yes      Attends Club or Organization Meetings: Not on file      Marital Status:    Intimate Partner Violence: Not on file   Housing Stability: Low Risk  (2021)    Housing Stability Vital Sign      Unable to Pay for Housing in the Last Year: No      Number of Places Lived in the Last Year: 1      Unstable Housing in the Last Year: No         FAMILY HISTORY:  Family History   Problem Relation Age of Onset     C.A.D. Father         MI 57     Alcohol/Drug Father         etoh     Coronary Artery Disease Father      Obesity Mother      Osteoporosis Mother      Colon Cancer Brother      Hyperlipidemia Son      Anxiety Disorder Son      Hyperlipidemia Son         very high, experimental drug     C.A.D. Paternal Grandmother         ascvd     Diabetes Maternal Grandmother      Cancer Maternal Grandmother      C.A.D. Paternal Uncle         Mi  age 48     Cancer Maternal Aunt         pancreatic CA     Hyperlipidemia Son      Hyperlipidemia Cousin      Brother with colon cancer.  Negative for breast, ovarian or prostate cancer.    PHYSICAL EXAM:  Vital signs:  /83   Pulse 66   Resp 16   Ht 1.676 m (5' 6\")   Wt 71.2 kg (157 lb)   LMP 2005   SpO2 98%   BMI 25.34 kg/m     GENERAL: No acute distress.  EYES: No scleral icterus. No overt erythema.  RESPIRATORY: No audible cough, wheezing, or labored breathing.  MUSCULOSKELETAL: Range of motion in the neck, shoulders, and arms appear normal.  SKIN: No overt rashes, discolorations, or lesions over the face and neck.  NEUROLOGIC: Alert.  No overt tremors.  PSYCHIATRIC: Normal affect and mood.  Does not appear anxious.      LABS:  CBC RESULTS: Recent Labs   Lab Test 22  1735   WBC 5.6   RBC 4.51   HGB 13.5   HCT 42.5   MCV 94   MCH 29.9   MCHC 31.8   RDW 14.4        Last Comprehensive Metabolic Panel:  Sodium   Date " Value Ref Range Status   03/24/2023 142 136 - 145 mmol/L Final   05/17/2021 142 133 - 144 mmol/L Final     Potassium   Date Value Ref Range Status   03/24/2023 4.3 3.4 - 5.3 mmol/L Final   05/26/2022 3.9 3.4 - 5.3 mmol/L Final   05/17/2021 4.3 3.4 - 5.3 mmol/L Final     Chloride   Date Value Ref Range Status   03/24/2023 106 98 - 107 mmol/L Final   05/26/2022 106 94 - 109 mmol/L Final   05/17/2021 107 94 - 109 mmol/L Final     Carbon Dioxide   Date Value Ref Range Status   05/17/2021 32 20 - 32 mmol/L Final     Carbon Dioxide (CO2)   Date Value Ref Range Status   03/24/2023 23 22 - 29 mmol/L Final   05/26/2022 29 20 - 32 mmol/L Final     Anion Gap   Date Value Ref Range Status   03/24/2023 13 7 - 15 mmol/L Final   05/26/2022 4 3 - 14 mmol/L Final   05/17/2021 3 3 - 14 mmol/L Final     Glucose   Date Value Ref Range Status   03/24/2023 91 70 - 99 mg/dL Final   06/01/2022 137 (H) 70 - 99 mg/dL Final   05/17/2021 78 70 - 99 mg/dL Final     Comment:     Fasting specimen     Urea Nitrogen   Date Value Ref Range Status   03/24/2023 13.4 8.0 - 23.0 mg/dL Final   05/26/2022 17 7 - 30 mg/dL Final   05/17/2021 13 7 - 30 mg/dL Final     Creatinine   Date Value Ref Range Status   03/24/2023 0.64 0.51 - 0.95 mg/dL Final   05/17/2021 0.75 0.52 - 1.04 mg/dL Final     GFR Estimate   Date Value Ref Range Status   03/24/2023 >90 >60 mL/min/1.73m2 Final     Comment:     eGFR calculated using 2021 CKD-EPI equation.   05/17/2021 81 >60 mL/min/[1.73_m2] Final     Comment:     Non  GFR Calc  Starting 12/18/2018, serum creatinine based estimated GFR (eGFR) will be   calculated using the Chronic Kidney Disease Epidemiology Collaboration   (CKD-EPI) equation.       Calcium   Date Value Ref Range Status   03/24/2023 9.2 8.8 - 10.2 mg/dL Final   05/17/2021 9.3 8.5 - 10.1 mg/dL Final     Bilirubin Total   Date Value Ref Range Status   03/24/2023 0.3 <=1.2 mg/dL Final   05/17/2021 0.4 0.2 - 1.3 mg/dL Final     Alkaline  Phosphatase   Date Value Ref Range Status   03/24/2023 76 35 - 104 U/L Final   05/17/2021 88 40 - 150 U/L Final     ALT   Date Value Ref Range Status   03/24/2023 18 10 - 35 U/L Final   05/17/2021 26 0 - 50 U/L Final     AST   Date Value Ref Range Status   03/24/2023 34 10 - 35 U/L Final   05/17/2021 20 0 - 45 U/L Final       PATHOLOGY:  None new.    IMAGING:  Reviewed as per HPI.    ASSESSMENT/PLAN:  Angeli Jacobson is a 72 year old female with the following issues:  1. Pathologic prognostic stage IA, [id number], grade 2 invasive ductal carcinoma of the right upper outer breast, ER positive (%), DE positive (70%), HER2/compa FISH negative  -Angeli is s/p right lumpectomy and radiation completed 3/21/2023.    --I advised a total of 5 years of hormone blockade therapy with either .  --I reiterated the potential side effects of anastrozole, including but not limited to: fatigue, myalgias/arthralgias, bone density loss, decrease sexual drive, vaginal dryness, nausea, headache, difficulty sleeping, hot flashes, night sweats, small cardiovascular risk.  --Tamoxifen would be less optimal given concern for increased risk of blood clots.  --She expresses initial hesitance about starting hormone blockade therapy due to potential adverse effects. We talked through the possibility of trying letrozole if she does not tolerate anastrozole or discontinue hormone blockade altogether if she does not tolerate it.  --Ultimately, she has agreed to try anastrozole.  --She has right diagnostic mammogram already scheduled for 5/16/2023.  --Due for next annual bilateral mammogram for 11/2023.    2.  History of myxoid liposarcoma of left thigh  - Status post excision and radiation completed in 2000.  She did not receive chemotherapy for her liposarcoma.  This has resulted in left lower extremity lymphedema.  - Continue lymphedema therapy and compression stockings.    3.  Peripheral arterial disease  -Has history of left lower  extremity peripheral arterial disease and is status post femoral-popliteal bypass with later stent to bypass.  She is on aspirin and Plavix.    4. Osteoporosis  --History of taking alendronate, off for past year.  - DEXA scan from 12/22/2022 showed osteoporosis.  - Continue adequate calcium and vitamin D, denosumab/Prolia, next due 7/10/2023.    Return in 2-3 months.    Emilia Angulo MD  Hematology/Oncology  [facility]    Total time spent: 30 minutes in patient evaluation, counseling, documentation, and coordination of care.

## 2023-04-04 NOTE — PATIENT INSTRUCTIONS
Go for fasting lipid and other labs then see cardiology    Continue repatha and other meds     Walk as much as you can     Refilled meds of zetia, levothyroxine and plavix     Follow up with Dr. Owens for graft related changes seen on arterial duplex    Follow up in 6 months

## 2023-04-05 ENCOUNTER — TELEPHONE (OUTPATIENT)
Dept: OTHER | Facility: CLINIC | Age: 73
End: 2023-04-05
Payer: COMMERCIAL

## 2023-04-05 NOTE — TELEPHONE ENCOUNTER
Please arrange for an appointment with Dr. Owens at next available.    Appt Note:   LOV 5/18/2019 with Dr. Owens. Hx: 1/21/19 redo left femoral to above-knee popliteal bypass with 6 mm PTFE; referred back by Dr. Prince for graft related changes seen on arterial duplex 3/23/23.

## 2023-04-05 NOTE — TELEPHONE ENCOUNTER
Future Appointments   Date Time Provider Department Center   4/7/2023  9:30 AM Shade Owens MD [facility]

## 2023-04-07 ENCOUNTER — TELEPHONE (OUTPATIENT)
Dept: OTHER | Facility: CLINIC | Age: 73
End: 2023-04-07
Payer: COMMERCIAL

## 2023-04-07 ENCOUNTER — OFFICE VISIT (OUTPATIENT)
Dept: OTHER | Facility: CLINIC | Age: 73
End: 2023-04-07
Attending: SURGERY
Payer: COMMERCIAL

## 2023-04-07 VITALS — SYSTOLIC BLOOD PRESSURE: 144 MMHG | HEART RATE: 72 BPM | DIASTOLIC BLOOD PRESSURE: 85 MMHG

## 2023-04-07 DIAGNOSIS — Z95.828 HISTORY OF ARTERIAL BYPASS OF LOWER EXTREMITY: Primary | ICD-10-CM

## 2023-04-07 DIAGNOSIS — I89.0 LYMPHEDEMA OF LEFT LEG: ICD-10-CM

## 2023-04-07 DIAGNOSIS — Z98.890 HISTORY OF FEMOROPOPLITEAL BYPASS: Primary | ICD-10-CM

## 2023-04-07 DIAGNOSIS — T82.858A STENOSIS OF NONCORONARY BYPASS GRAFT, INITIAL ENCOUNTER (H): ICD-10-CM

## 2023-04-07 DIAGNOSIS — C49.9 MYXOID LIPOSARCOMA (H): ICD-10-CM

## 2023-04-07 PROCEDURE — G0463 HOSPITAL OUTPT CLINIC VISIT: HCPCS

## 2023-04-07 PROCEDURE — 99204 OFFICE O/P NEW MOD 45 MIN: CPT | Performed by: SURGERY

## 2023-04-07 PROCEDURE — G0463 HOSPITAL OUTPT CLINIC VISIT: HCPCS | Performed by: SURGERY

## 2023-04-07 ASSESSMENT — ENCOUNTER SYMPTOMS
GASTROINTESTINAL NEGATIVE: 1
BACK PAIN: 1
ALLERGIC/IMMUNOLOGIC NEGATIVE: 1
HEMATOLOGIC/LYMPHATIC NEGATIVE: 1
ENDOCRINE NEGATIVE: 1
RESPIRATORY NEGATIVE: 1
CLAUDICATION: 0
EYES NEGATIVE: 1
PSYCHIATRIC NEGATIVE: 1
CONSTITUTIONAL NEGATIVE: 1
NEUROLOGICAL NEGATIVE: 1

## 2023-04-07 NOTE — PROGRESS NOTES
[facility]        Patient is here for a  follow up.     Pt is currently taking Aspirin, Statin and Plavix.    BP (!) 144/85 (BP Location: Right arm, Patient Position: Chair, Cuff Size: Adult Regular)   Pulse 72   LMP 03/18/2005     The provider has been notified that the patient has no concerns.     Questions patient would like addressed today are: N/A.    Refills are needed: N/A    Has homecare services and agency name:  Ninfa Bowser MA

## 2023-04-07 NOTE — PROGRESS NOTES
[facility] VASCULAR [facility] CENTER INITIAL VASCULAR SURGERY CONSULT    Impression:   1.  Status post left common femoral to above-knee popliteal bypass with 6 mm PTFE graft performed by me on 1/21/2019 now with developing stenosis in the proximal graft which would be either intramural thrombus or intimal hyperplasia.    2.  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  She has resultant moderate left leg lymphedema.    3.  Status post multiple left leg orthopedic procedures over the past 3 years.    4.  History of right breast carcinoma status post lumpectomy and sentinel lymph node biopsy in 2022.    Plan:   I had a nice discussion with Angeli and her  reviewing all the above.  She still has an easily palpable left dorsalis pedis pulse and a normal resting left-sided ADRIAN.  Options would include either close observation versus left leg angiogram and potential coverage of the proximal graft with a covered stent.  I favor close observation.  Additionally, I will discuss with Dr. Prince restarting low-dose Xarelto and continuing Plavix with discontinuation of her daily aspirin.  I defer to his judgment on that option.  I will schedule a repeat left leg graft ultrasound for late May and I will see her at that time.  They have an international cruise in late July and I would prefer to address this issue if needed before that departure.    All of their questions were answered and they verbalized full understanding to the above and complete agreement with this management plan.    Total length of this encounter was 45 minutes with time spent reviewing multiple studies, interviewing and examining the patient, answering questions, and coordinating a treatment plan.      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  At  that time she had arterial reconstruction using a PTFE interposition graft.  That graft occluded in 2019 and I performed a redo left femoral to above-knee popliteal bypass with 6 mm ringed PTFE graft.  For reasons that are unclear to all of us, she has been lost to my follow-up.  She still sees Dr. Prince and vascular medicine and a graft ultrasound performed last month was suggestive of a developing moderate stenosis in the proximal graft.  She is not a diabetic and does not smoke.  Presumably this stenotic area represents probable intramural thrombus versus less likely intimal hyperplasia.  Clinically she is unaware of any issues with the graft.  She admits that she has not ambulated much over the past 3 years as she is recovering from multiple orthopedic procedures on her left leg.  She utilizes a thigh-high compression stocking of 30-40 mmHg on a daily basis.      CURRENT MEDICATIONS  calcium carbonate 600 mg-vitamin D 400 units (CALTRATE) 600-400 MG-UNIT per tablet, Take 1 chew tab by mouth daily  clopidogrel (PLAVIX) 75 MG tablet, Take 1 tablet (75 mg) by mouth daily  econazole nitrate 1 % external cream, Apply topically daily (Patient taking differently: Apply topically daily Toes)  evolocumab (REPATHA) 140 MG/ML prefilled autoinjector, Inject 1 mL (140 mg) Subcutaneous every 14 days  ezetimibe (ZETIA) 10 MG tablet, Take 1 tablet (10 mg) by mouth daily  levothyroxine (SYNTHROID/LEVOTHROID) 75 MCG tablet, Take 1 tablet (75 mcg) by mouth daily  multivitamin, therapeutic (THERA-VIT) TABS tablet, Take 1 tablet by mouth daily  nitroFURantoin macrocrystal-monohydrate (MACROBID) 100 MG capsule, TAKE 1 CAPSULE BY MOUTH AFTER INTRCOURSE  rosuvastatin (CRESTOR) 20 MG tablet, Take 1 tablet (20 mg) by mouth daily  solifenacin (VESICARE) 5 MG tablet, Take 1 tablet (5 mg) by mouth daily  Vitamin D3 (CHOLECALCIFEROL) 25 mcg (1000 units) tablet, Take 2 tablets (50 mcg) by mouth daily  anastrozole (ARIMIDEX) 1 MG tablet,  Take 1 tablet (1 mg) by mouth daily    No current facility-administered medications on file prior to visit.        PAST MEDICAL HISTORY  Past Medical History:   Diagnosis Date     * * * SBE PROPHYLAXIS * * * 1998    Amox 500mg, take 4 tabs one hour prior to procedure.Takes this because of lymphedema secondary from leg surgey     Antiplatelet or antithrombotic long-term use      Arrhythmia      Central serous retinopathy 2001    Resolved 9/2001     CHRONIC NECK PAIN 1995     Depressive disorder      Depressive disorder, not elsewhere classified 2001     Elevated coronary artery calcium score=7/1/21 08/03/2021     Elevated coronary artery calcium score=7/1/21 08/03/2021     History of blood transfusion 04/2019 12/2020    during left hip pinning, during surgery for patella     Lateral epicondylitis      MIXED HYPERLIPIDEMIA, LDL GOAL <160 1998    LDL goal < 160     Motion sickness      MYXOID LIPOSARCOMA 2000    Left thigh, S/P excision, radiation  at [facility]     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    CHRONIC LEFT THIGH LYMPHEDEMA     Nontoxic multinodular goiter 2005    needs yearly      Osteoporosis, unspecified 2001     Other chronic pain     fx ribs left      Other lymphedema 2000    left thigh, gets regular PT for this     Overactive bladder      PAD (peripheral artery disease) (H) 04/20/2018     PONV (postoperative nausea and vomiting)      SHINGLES 2001     Sprain of lumbosacral (joint) (ligament) 1995    right     Unspecified hearing loss 1998    chronic tinnitus     Unspecified tinnitus 1998         PAST SURGICAL HISTORY:  Past Surgical History:   Procedure Laterality Date     ARTHROPLASTY HIP Left 10/4/2019    Procedure: Removal of left femoral hardware with a conversion to left total hip arthroplasty using a Biomet Annalisa femoral stem with an OsseoTi acetabular shell and a dual mobility bearing surface;  Surgeon: Spencer Celeste MD;  Location: [facility] OR     BIOPSY  April 2000     BIOPSY BREAST SEED  LOCALIZATION Right 12/14/2022    Procedure: right breast tag localized lumpectomy;  Surgeon: Shelly Carr MD;  Location:  OR     BIOPSY NODE SENTINEL Right 12/14/2022    Procedure: with right sentinel lymph node biopsy;  Surgeon: Shelly Carr MD;  Location:  OR     BYPASS GRAFT FEMOROPOPLITEAL Left 1/21/2019    Procedure: LEFT FEMORAL TO ABOVE KNEE POPLITEAL  BYPASS WITH POLYTETRAFLUOROETHYLENE GRAFT;  Surgeon: Shade Owens MD;  Location:  OR     COLONOSCOPY N/A 2/5/2016    Procedure: COMBINED COLONOSCOPY, SINGLE OR MULTIPLE BIOPSY/POLYPECTOMY BY BIOPSY;  Surgeon: Varun Stanley MD, MD;  Location:  GI     COLONOSCOPY N/A 12/10/2021    Procedure: COLONOSCOPY, WITH POLYPECTOMIES  USING COLD  SNARE,   CLIP APPLIED X2;  Surgeon: Dayton Luna MD;  Location:  GI     CYSTOSCOPY       EXCISION MALIG LESION>1.25CM  5/2000    Myxoid Liposarcoma       EXPLORE GROIN Right 5/1/2018    Procedure: EXPLORE GROIN;  EMERGENCY RIGHT FEMORAL EXPLORATION WITH FEMORAL ARTERY REPAIR.    EBL: 50mL;  Surgeon: Shade Owens MD;  Location:  OR     HC COLP CERVIX/UPPER VAGINA  07/1997    Negative     HC DILATION/CURETTAGE DIAG/THER NON OB  02/1997    Post menopausal bleeding on HRT, negative     HIP SURGERY Left 04/13/2019     IR ANGIOGRAM THROUGH CATHETER FOLLOW UP  12/20/2018     IR ANGIOGRAM THROUGH CATHETER FOLLOW UP  12/21/2018     IR LOWER EXTREMITY ANGIOGRAM LEFT  12/19/2018     OPEN REDUCTION INTERNAL FIXATION PATELLA Left 12/21/2020    Procedure: Left partial patella fracture excision with advancement of the quadriceps tendon;  Surgeon: Stephen Rhodes MD;  Location:  OR     OPEN REDUCTION INTERNAL FIXATION RODDING INTRAMEDULLARY TIBIA Left 12/21/2020    Procedure: Open reduction intramedullary nailing of left tibia fracture;  Surgeon: Stephen Rhodes MD;  Location:  OR     REMOVE HARDWARE KNEE Left 5/31/2022    Procedure: Left knee patella hardware removal;  Surgeon: Spencer Celeste  MD Chaka;  Location: [facility] OR     REPAIR TENDON QUADRICEPS Left 2021    Procedure: Left knee quadricepsplasty with intraoperative patellar fracture requiring open reduction and internal fixation of the patella;  Surgeon: Spencer Celeste MD;  Location: [facility] OR     REPAIR TENDON QUADRICEPS Left 2022    Procedure: Open left knee VY quadricepsplasty with hardware removal and allograft;  Surgeon: Spencer Celeste MD;  Location: [facility] OR     VASCULAR SURGERY  2018    Left SFA stent in bypass graft     ZZC APPENDECTOMY      Appendectomy     ZZC NONSPECIFIC PROCEDURE  2000    Open Biopsy Left Thigh Liposarcoma     ZZHC COLONOSCOPY THRU STOMA, DIAGNOSTIC      due        ALLERGIES     Allergies   Allergen Reactions     Celexa [Citalopram Hydrobromide]      Decreased libido       FAMILY HISTORY  Family History   Problem Relation Age of Onset     C.A.D. Father         MI 57     Alcohol/Drug Father         etoh     Coronary Artery Disease Father      Obesity Mother      Osteoporosis Mother      Colon Cancer Brother      Hyperlipidemia Son      Anxiety Disorder Son      Hyperlipidemia Son         very high, experimental drug     C.A.D. Paternal Grandmother         ascvd     Diabetes Maternal Grandmother      Cancer Maternal Grandmother      C.A.D. Paternal Uncle         Mi  age 48     Cancer Maternal Aunt         pancreatic CA     Hyperlipidemia Son      Hyperlipidemia Cousin        SOCIAL HISTORY  Social History     Tobacco Use     Smoking status: Never     Passive exposure: Never     Smokeless tobacco: Never   Vaping Use     Vaping status: Never Used   Substance Use Topics     Alcohol use: Never     Drug use: Never       ROS:   Review of Systems   Constitutional: Negative.   HENT: Negative.    Eyes: Negative.    Cardiovascular: Negative for claudication.   Respiratory: Negative.    Endocrine: Negative.    Hematologic/Lymphatic: Negative.    Skin: Negative.    Musculoskeletal:  Positive for back pain and joint pain.   Gastrointestinal: Negative.    Genitourinary: Negative.    Neurological: Negative.    Psychiatric/Behavioral: Negative.    Allergic/Immunologic: Negative.          EXAM:  BP (!) 144/85 (BP Location: Right arm, Patient Position: Chair, Cuff Size: Adult Regular)   Pulse 72   LMP 03/18/2005   Physical Exam  Vitals and nursing note reviewed.   Constitutional:       Appearance: Normal appearance.   HENT:      Head: Normocephalic and atraumatic.   Eyes:      General: No scleral icterus.     Extraocular Movements: Extraocular movements intact.      Pupils: Pupils are equal, round, and reactive to light.   Cardiovascular:      Pulses:           Dorsalis pedis pulses are 2+ on the right side and 2+ on the left side.        Posterior tibial pulses are 2+ on the right side.      Comments: Lymphedematous changes of the left leg.  She wears a thigh-high compression stocking daily.  No open wounds.  Musculoskeletal:      Left lower leg: Edema present.   Skin:     General: Skin is warm and dry.   Neurological:      General: No focal deficit present.      Mental Status: She is alert and oriented to person, place, and time. Mental status is at baseline.   Psychiatric:         Mood and Affect: Mood normal.         Behavior: Behavior normal.         Thought Content: Thought content normal.         Judgment: Judgment normal.         Labs:  LIPID RESULTS:  Lab Results   Component Value Date    CHOL 130 03/02/2022    CHOL 196 05/17/2021    HDL 54 03/02/2022    HDL 56 05/17/2021    LDL 55 03/02/2022     (H) 05/17/2021    TRIG 105 03/02/2022    TRIG 76 05/17/2021    CHOLHDLRATIO 4.0 09/11/2015       CBC RESULTS:  Lab Results   Component Value Date    WBC 5.6 12/08/2022    WBC 5.6 05/17/2021    RBC 4.51 12/08/2022    RBC 4.61 05/17/2021    HGB 13.5 12/08/2022    HGB 13.2 05/17/2021    HCT 42.5 12/08/2022    HCT 41.7 05/17/2021    MCV 94 12/08/2022    MCV 91 05/17/2021    MCH 29.9 12/08/2022     MCH 28.6 05/17/2021    MCHC 31.8 12/08/2022    MCHC 31.7 05/17/2021    RDW 14.4 12/08/2022    RDW 15.2 (H) 05/17/2021     12/08/2022     05/17/2021       BMP RESULTS:  Lab Results   Component Value Date     03/24/2023     05/17/2021    POTASSIUM 4.3 03/24/2023    POTASSIUM 3.9 05/26/2022    POTASSIUM 4.3 05/17/2021    CHLORIDE 106 03/24/2023    CHLORIDE 106 05/26/2022    CHLORIDE 107 05/17/2021    CO2 23 03/24/2023    CO2 29 05/26/2022    CO2 32 05/17/2021    ANIONGAP 13 03/24/2023    ANIONGAP 4 05/26/2022    ANIONGAP 3 05/17/2021    GLC 91 03/24/2023     (H) 06/01/2022    GLC 78 05/17/2021    BUN 13.4 03/24/2023    BUN 17 05/26/2022    BUN 13 05/17/2021    CR 0.64 03/24/2023    CR 0.75 05/17/2021    GFRESTIMATED >90 03/24/2023    GFRESTIMATED 81 05/17/2021    GFRESTBLACK >90 05/17/2021    LONG 9.2 03/24/2023    LONG 9.3 05/17/2021        A1C RESULTS:  Lab Results   Component Value Date    A1C 5.5 01/17/2019         Imaging:  I reviewed an ADRIAN and a left leg graft ultrasound from 3/23/2023.        Shade Owens MD

## 2023-04-07 NOTE — TELEPHONE ENCOUNTER
Reviewed recent ADRIAN and arterial duplex proximal graft narrowed to 2.8 mm previously 6.0 mm    Currently taking dual antiplatelet agent baby aspirin with Plavix    She was also diagnosed breast cancer underwent treatment through heme-onc clinic    No claudication symptoms    Given new changes she will benefit with low-dose anticoagulation with one of the antiplatelet medication    She was seen and evaluated by Dr. Owens today we both discussed and agreed initiating low-dose Xarelto 2.5 mg twice a day and continue Plavix and stop the baby aspirin    Short interval arterial duplex before her upcoming cruise trip    New prescription sent and these changes communicated with the patient on the phone today    Plan:  Stop baby aspirin  Continue Plavix 75 daily  Take Xarelto 2.5 mg twice a day with food new prescription sent  Plan for short interval arterial duplex before her cruise trip    Discussed with Dr. Owens today    Tiesha Prince MD, FAWARREN, FSVM, FNLA,FACP  Vascular medicine

## 2023-04-07 NOTE — TELEPHONE ENCOUNTER
Explained Dr. Prince's instructions to the patient and reassured her that Dr. Owens is in agreement.    Barbara Greenwood RN BSN  [facility]  [phone]

## 2023-04-07 NOTE — TELEPHONE ENCOUNTER
[facility] VASCULAR [facility] CENTER    Who is the name of the provider?  Dr Owens / Dr Prince    What is the location you see this provider at/preferred location? [place]     Person calling / Facility:  Angeli    Phone number:  [phone]    Nurse call back needed:  Y     Reason for call:  1.Which provider does she take directions from? 2. Are 2 low dose Xarelto the same as 1 regular dose?      Pharmacy location:  N/A    Outside Imaging: N/A    Can we leave a detailed message on this number?  Y    Additional Info:

## 2023-04-10 ENCOUNTER — TELEPHONE (OUTPATIENT)
Dept: OTHER | Facility: CLINIC | Age: 73
End: 2023-04-10

## 2023-04-10 ENCOUNTER — ONCOLOGY VISIT (OUTPATIENT)
Dept: ONCOLOGY | Facility: CLINIC | Age: 73
End: 2023-04-10
Attending: INTERNAL MEDICINE
Payer: COMMERCIAL

## 2023-04-10 VITALS
SYSTOLIC BLOOD PRESSURE: 134 MMHG | OXYGEN SATURATION: 98 % | RESPIRATION RATE: 16 BRPM | HEIGHT: 66 IN | WEIGHT: 157 LBS | BODY MASS INDEX: 25.23 KG/M2 | DIASTOLIC BLOOD PRESSURE: 83 MMHG | HEART RATE: 66 BPM

## 2023-04-10 DIAGNOSIS — Z17.0 MALIGNANT NEOPLASM OF UPPER-OUTER QUADRANT OF RIGHT BREAST IN FEMALE, ESTROGEN RECEPTOR POSITIVE (H): Primary | ICD-10-CM

## 2023-04-10 DIAGNOSIS — Z95.828 HISTORY OF ARTERIAL BYPASS OF LOWER EXTREMITY: ICD-10-CM

## 2023-04-10 DIAGNOSIS — I73.9 PAD (PERIPHERAL ARTERY DISEASE) (H): Primary | ICD-10-CM

## 2023-04-10 DIAGNOSIS — M81.0 AGE-RELATED OSTEOPOROSIS WITHOUT CURRENT PATHOLOGICAL FRACTURE: ICD-10-CM

## 2023-04-10 DIAGNOSIS — E11.9 DIABETES MELLITUS (H): ICD-10-CM

## 2023-04-10 DIAGNOSIS — Z95.828 HISTORY OF ARTERIAL BYPASS OF LOWER EXTREMITY: Primary | ICD-10-CM

## 2023-04-10 DIAGNOSIS — I73.9 PAD (PERIPHERAL ARTERY DISEASE) (H): ICD-10-CM

## 2023-04-10 DIAGNOSIS — Z87.891 SMOKING HISTORY: ICD-10-CM

## 2023-04-10 DIAGNOSIS — C50.411 MALIGNANT NEOPLASM OF UPPER-OUTER QUADRANT OF RIGHT BREAST IN FEMALE, ESTROGEN RECEPTOR POSITIVE (H): Primary | ICD-10-CM

## 2023-04-10 PROCEDURE — G0463 HOSPITAL OUTPT CLINIC VISIT: HCPCS | Performed by: INTERNAL MEDICINE

## 2023-04-10 PROCEDURE — 99214 OFFICE O/P EST MOD 30 MIN: CPT | Performed by: INTERNAL MEDICINE

## 2023-04-10 RX ORDER — EPINEPHRINE 1 MG/ML
0.3 INJECTION, SOLUTION INTRAMUSCULAR; SUBCUTANEOUS EVERY 5 MIN PRN
Status: CANCELLED | OUTPATIENT
Start: 2023-08-22

## 2023-04-10 RX ORDER — ALBUTEROL SULFATE 0.83 MG/ML
2.5 SOLUTION RESPIRATORY (INHALATION)
Status: CANCELLED | OUTPATIENT
Start: 2023-08-22

## 2023-04-10 RX ORDER — MEPERIDINE HYDROCHLORIDE 25 MG/ML
25 INJECTION INTRAMUSCULAR; INTRAVENOUS; SUBCUTANEOUS EVERY 30 MIN PRN
Status: CANCELLED | OUTPATIENT
Start: 2023-08-22

## 2023-04-10 RX ORDER — ALBUTEROL SULFATE 90 UG/1
1-2 AEROSOL, METERED RESPIRATORY (INHALATION)
Status: CANCELLED
Start: 2023-08-22

## 2023-04-10 RX ORDER — METHYLPREDNISOLONE SODIUM SUCCINATE 125 MG/2ML
125 INJECTION, POWDER, LYOPHILIZED, FOR SOLUTION INTRAMUSCULAR; INTRAVENOUS
Status: CANCELLED
Start: 2023-08-22

## 2023-04-10 RX ORDER — DIPHENHYDRAMINE HYDROCHLORIDE 50 MG/ML
50 INJECTION INTRAMUSCULAR; INTRAVENOUS
Status: CANCELLED
Start: 2023-08-22

## 2023-04-10 ASSESSMENT — PAIN SCALES - GENERAL: PAINLEVEL: NO PAIN (0)

## 2023-04-10 NOTE — LETTER
4/10/2023         RE: Angeli Jacobson  [id number]  [facility] [address]        Dear Colleague,    Thank you for referring your patient, Angeli Jacobson, to the [facility]. Please see a copy of my visit note below.    [facility]    Hematology/Oncology Established Patient Note      Today's Date: 4/10/2023    Reason for follow-up:  Right breast cancer.    HISTORY OF PRESENT ILLNESS: Angeli Jacobson is a 72 year old female with history of left thigh liposarcoma status post excision in 2000 with subsequent left leg lymphedema and peripheral arterial disease status post redo left common femoral to popliteal artery bypass in January 2019, who presents with the following oncologic history:  1.  11/7/2022: Screening mammogram showed asymmetry in the right breast at 12:00, retroareolar far posterior.  Left breast negative.  2.  11/15/2022: Diagnostic right mammogram showed asymmetry in the posterior right breast, 8 cm from nipple.  Ultrasound of right breast at 12:00, 2 cm from nipple showed a small benign cyst but no definite sonographic correlate for the mammographic asymmetry.  3.  11/16/2022: Stereotactic guided right breast needle biopsy of 8 mm lesion at 3:00, 8 cm from the nipple showed grade 2 invasive ductal carcinoma, ER positive at %, TN positive at 60-70%, HER2 IHC = 2+ equivocal, HER2/compa FISH negative.  4. 12/14/2022: Underwent right breast lumpectomy with right axillary sentinel lymph node excision with Dr. Shelly Carr.  Pathology showed no residual invasive malignancy; 2 microscopic foci of DCIS, grade 2, largest measuring 1.2 mm; margins negative; total 2 right axillary lymph nodes negative.  5. 3/21/2023: Completed adjuvant radiation therapy to the right breast.     INTERVAL HISTORY:  Angeli reports she has not yet started anastrozole. She feels she has healed well from radiation.      REVIEW OF SYSTEMS:   14 point  ROS was reviewed and is negative other than as noted above in HPI.       HOME MEDICATIONS:  Current Outpatient Medications   Medication Sig Dispense Refill     anastrozole (ARIMIDEX) 1 MG tablet Take 1 tablet (1 mg) by mouth daily 90 tablet 3     aspirin (ASA) 81 MG chewable tablet Take 81 mg by mouth daily       calcium carbonate 600 mg-vitamin D 400 units (CALTRATE) 600-400 MG-UNIT per tablet Take 1 chew tab by mouth daily       clopidogrel (PLAVIX) 75 MG tablet Take 1 tablet (75 mg) by mouth daily 90 tablet 3     econazole nitrate 1 % external cream Apply topically daily (Patient taking differently: Apply topically daily Toes) 85 g 3     evolocumab (REPATHA) 140 MG/ML prefilled autoinjector Inject 1 mL (140 mg) Subcutaneous every 14 days 6 mL 3     ezetimibe (ZETIA) 10 MG tablet Take 1 tablet (10 mg) by mouth daily 90 tablet 3     levothyroxine (SYNTHROID/LEVOTHROID) 75 MCG tablet Take 1 tablet (75 mcg) by mouth daily 90 tablet 3     multivitamin, therapeutic (THERA-VIT) TABS tablet Take 1 tablet by mouth daily       nitroFURantoin macrocrystal-monohydrate (MACROBID) 100 MG capsule TAKE 1 CAPSULE BY MOUTH AFTER INTRCOURSE 30 capsule 3     rosuvastatin (CRESTOR) 20 MG tablet Take 1 tablet (20 mg) by mouth daily 90 tablet 3     solifenacin (VESICARE) 5 MG tablet Take 1 tablet (5 mg) by mouth daily 90 tablet 3     Vitamin D3 (CHOLECALCIFEROL) 25 mcg (1000 units) tablet Take 2 tablets (50 mcg) by mouth daily 60 tablet 0         ALLERGIES:  Allergies   Allergen Reactions     Celexa [Citalopram Hydrobromide]      Decreased libido         PAST MEDICAL HISTORY:  Past Medical History:   Diagnosis Date     * * * SBE PROPHYLAXIS * * * 1998    Amox 500mg, take 4 tabs one hour prior to procedure.Takes this because of lymphedema secondary from leg surgey     Antiplatelet or antithrombotic long-term use      Arrhythmia      Central serous retinopathy 2001    Resolved 9/2001     CHRONIC NECK PAIN 1995     Depressive disorder       Depressive disorder, not elsewhere classified      Elevated coronary artery calcium score=2021     Elevated coronary artery calcium score=2021     History of blood transfusion 2019    during left hip pinning, during surgery for patella     Lateral epicondylitis      MIXED HYPERLIPIDEMIA, LDL GOAL <160     LDL goal < 160     Motion sickness      MYXOID LIPOSARCOMA 2000    Left thigh, S/P excision, radiation  at [facility]     Myxoid liposarcoma (HCC) 2004    CHRONIC LEFT THIGH LYMPHEDEMA     Nontoxic multinodular goiter 2005    needs yearly US     Osteoporosis, unspecified      Other chronic pain     fx ribs left      Other lymphedema 2000    left thigh, gets regular PT for this     Overactive bladder      PAD (peripheral artery disease) (H) 2018     PONV (postoperative nausea and vomiting)      SHINGLES      Sprain of lumbosacral (joint) (ligament)     right     Unspecified hearing loss     chronic tinnitus     Unspecified tinnitus      Gynecologic history:  Age of menarche at 11, , age of first pregnancy at 29, 5 years of OCP use, no HRT or fertility treatment.    PAST SURGICAL HISTORY:  Past Surgical History:   Procedure Laterality Date     ARTHROPLASTY HIP Left 10/4/2019    Procedure: Removal of left femoral hardware with a conversion to left total hip arthroplasty using a Biomet Annalisa femoral stem with an OsseoTi acetabular shell and a dual mobility bearing surface;  Surgeon: Spencer Celeste MD;  Location: [facility] OR     BIOPSY  2000     BIOPSY BREAST SEED LOCALIZATION Right 2022    Procedure: right breast tag localized lumpectomy;  Surgeon: Shelly Carr MD;  Location: [facility] OR     BIOPSY NODE SENTINEL Right 2022    Procedure: with right sentinel lymph node biopsy;  Surgeon: Shelly Carr MD;  Location: [facility] OR     BYPASS GRAFT FEMOROPOPLITEAL Left 2019    Procedure: LEFT FEMORAL TO ABOVE KNEE  POPLITEAL  BYPASS WITH POLYTETRAFLUOROETHYLENE GRAFT;  Surgeon: Shade Owens MD;  Location: [facility] OR     COLONOSCOPY N/A 2/5/2016    Procedure: COMBINED COLONOSCOPY, SINGLE OR MULTIPLE BIOPSY/POLYPECTOMY BY BIOPSY;  Surgeon: Varun Stanley MD, MD;  Location:  GI     COLONOSCOPY N/A 12/10/2021    Procedure: COLONOSCOPY, WITH POLYPECTOMIES  USING COLD  SNARE,   CLIP APPLIED X2;  Surgeon: Dayton Luna MD;  Location:  GI     CYSTOSCOPY       EXCISION MALIG LESION>1.25CM  5/2000    Myxoid Liposarcoma       EXPLORE GROIN Right 5/1/2018    Procedure: EXPLORE GROIN;  EMERGENCY RIGHT FEMORAL EXPLORATION WITH FEMORAL ARTERY REPAIR.    EBL: 50mL;  Surgeon: Shade Owens MD;  Location: [facility] OR     HC COLP CERVIX/UPPER VAGINA  07/1997    Negative     HC DILATION/CURETTAGE DIAG/THER NON OB  02/1997    Post menopausal bleeding on HRT, negative     HIP SURGERY Left 04/13/2019     IR ANGIOGRAM THROUGH CATHETER FOLLOW UP  12/20/2018     IR ANGIOGRAM THROUGH CATHETER FOLLOW UP  12/21/2018     IR LOWER EXTREMITY ANGIOGRAM LEFT  12/19/2018     OPEN REDUCTION INTERNAL FIXATION PATELLA Left 12/21/2020    Procedure: Left partial patella fracture excision with advancement of the quadriceps tendon;  Surgeon: Stephen Rhodes MD;  Location: [facility] OR     OPEN REDUCTION INTERNAL FIXATION RODDING INTRAMEDULLARY TIBIA Left 12/21/2020    Procedure: Open reduction intramedullary nailing of left tibia fracture;  Surgeon: Stephen Rhodes MD;  Location: [facility] OR     REMOVE HARDWARE KNEE Left 5/31/2022    Procedure: Left knee patella hardware removal;  Surgeon: Spencer Celeste MD;  Location: [facility] OR     REPAIR TENDON QUADRICEPS Left 7/28/2021    Procedure: Left knee quadricepsplasty with intraoperative patellar fracture requiring open reduction and internal fixation of the patella;  Surgeon: Spencer Celeste MD;  Location: [facility] OR     REPAIR TENDON QUADRICEPS Left 5/31/2022    Procedure: Open left knee VY  quadricepsplasty with hardware removal and allograft;  Surgeon: Spencer Celeste MD;  Location: [facility] OR     VASCULAR SURGERY  04/30/2018    Left SFA stent in bypass graft     ZZC APPENDECTOMY      Appendectomy     ZZC NONSPECIFIC PROCEDURE  04/2000    Open Biopsy Left Thigh Liposarcoma     ZZHC COLONOSCOPY THRU STOMA, DIAGNOSTIC  2006    due 2010         SOCIAL HISTORY:  Social History     Socioeconomic History     Marital status:      Spouse name: Chris     Number of children: 3     Years of education: 14     Highest education level: Associate degree: academic program   Occupational History     Occupation: at home     Employer: NONE    Tobacco Use     Smoking status: Never     Passive exposure: Never     Smokeless tobacco: Never   Vaping Use     Vaping status: Never Used   Substance and Sexual Activity     Alcohol use: Never     Drug use: Never     Sexual activity: Yes     Partners: Male     Birth control/protection: Male Surgical     Comment:  had a vasectomy   Other Topics Concern     Parent/sibling w/ CABG, MI or angioplasty before 65F 55M? No   Social History Narrative     Not on file     Social Determinants of Health     Financial Resource Strain: Low Risk  (12/16/2021)    Overall Financial Resource Strain (CARDIA)      Difficulty of Paying Living Expenses: Not hard at all   Food Insecurity: No Food Insecurity (12/16/2021)    Hunger Vital Sign      Worried About Running Out of Food in the Last Year: Never true      Ran Out of Food in the Last Year: Never true   Transportation Needs: No Transportation Needs (12/16/2021)    PRAPARE - Transportation      Lack of Transportation (Medical): No      Lack of Transportation (Non-Medical): No   Physical Activity: Inactive (12/16/2021)    Exercise Vital Sign      Days of Exercise per Week: 0 days      Minutes of Exercise per Session: 0 min   Stress: Stress Concern Present (12/16/2021)    [employer] - Occupational Stress  "Questionnaire      Feeling of Stress : To some extent   Social Connections: Moderately Integrated (2021)    Social Connection and Isolation Panel [NHANES]      Frequency of Communication with Friends and Family: Never      Frequency of Social Gatherings with Friends and Family: Never      Attends [employer] Services: More than 4 times per year      Active Member of Clubs or Organizations: Yes      Attends Club or Organization Meetings: Not on file      Marital Status:    Intimate Partner Violence: Not on file   Housing Stability: Low Risk  (2021)    Housing Stability Vital Sign      Unable to Pay for Housing in the Last Year: No      Number of Places Lived in the Last Year: 1      Unstable Housing in the Last Year: No         FAMILY HISTORY:  Family History   Problem Relation Age of Onset     C.A.D. Father         MI 57     Alcohol/Drug Father         etoh     Coronary Artery Disease Father      Obesity Mother      Osteoporosis Mother      Colon Cancer Brother      Hyperlipidemia Son      Anxiety Disorder Son      Hyperlipidemia Son         very high, experimental drug     C.A.D. Paternal Grandmother         ascvd     Diabetes Maternal Grandmother      Cancer Maternal Grandmother      C.A.D. Paternal Uncle         Mi  age 48     Cancer Maternal Aunt         pancreatic CA     Hyperlipidemia Son      Hyperlipidemia Cousin      Brother with colon cancer.  Negative for breast, ovarian or prostate cancer.    PHYSICAL EXAM:  Vital signs:  /83   Pulse 66   Resp 16   Ht 1.676 m (5' 6\")   Wt 71.2 kg (157 lb)   LMP 2005   SpO2 98%   BMI 25.34 kg/m     GENERAL: No acute distress.  EYES: No scleral icterus. No overt erythema.  RESPIRATORY: No audible cough, wheezing, or labored breathing.  MUSCULOSKELETAL: Range of motion in the neck, shoulders, and arms appear normal.  SKIN: No overt rashes, discolorations, or lesions over the face and neck.  NEUROLOGIC: Alert.  No overt " tremors.  PSYCHIATRIC: Normal affect and mood.  Does not appear anxious.      LABS:  CBC RESULTS: Recent Labs   Lab Test 12/08/22  1735   WBC 5.6   RBC 4.51   HGB 13.5   HCT 42.5   MCV 94   MCH 29.9   MCHC 31.8   RDW 14.4        Last Comprehensive Metabolic Panel:  Sodium   Date Value Ref Range Status   03/24/2023 142 136 - 145 mmol/L Final   05/17/2021 142 133 - 144 mmol/L Final     Potassium   Date Value Ref Range Status   03/24/2023 4.3 3.4 - 5.3 mmol/L Final   05/26/2022 3.9 3.4 - 5.3 mmol/L Final   05/17/2021 4.3 3.4 - 5.3 mmol/L Final     Chloride   Date Value Ref Range Status   03/24/2023 106 98 - 107 mmol/L Final   05/26/2022 106 94 - 109 mmol/L Final   05/17/2021 107 94 - 109 mmol/L Final     Carbon Dioxide   Date Value Ref Range Status   05/17/2021 32 20 - 32 mmol/L Final     Carbon Dioxide (CO2)   Date Value Ref Range Status   03/24/2023 23 22 - 29 mmol/L Final   05/26/2022 29 20 - 32 mmol/L Final     Anion Gap   Date Value Ref Range Status   03/24/2023 13 7 - 15 mmol/L Final   05/26/2022 4 3 - 14 mmol/L Final   05/17/2021 3 3 - 14 mmol/L Final     Glucose   Date Value Ref Range Status   03/24/2023 91 70 - 99 mg/dL Final   06/01/2022 137 (H) 70 - 99 mg/dL Final   05/17/2021 78 70 - 99 mg/dL Final     Comment:     Fasting specimen     Urea Nitrogen   Date Value Ref Range Status   03/24/2023 13.4 8.0 - 23.0 mg/dL Final   05/26/2022 17 7 - 30 mg/dL Final   05/17/2021 13 7 - 30 mg/dL Final     Creatinine   Date Value Ref Range Status   03/24/2023 0.64 0.51 - 0.95 mg/dL Final   05/17/2021 0.75 0.52 - 1.04 mg/dL Final     GFR Estimate   Date Value Ref Range Status   03/24/2023 >90 >60 mL/min/1.73m2 Final     Comment:     eGFR calculated using 2021 CKD-EPI equation.   05/17/2021 81 >60 mL/min/[1.73_m2] Final     Comment:     Non  GFR Calc  Starting 12/18/2018, serum creatinine based estimated GFR (eGFR) will be   calculated using the Chronic Kidney Disease Epidemiology Collaboration    (CKD-EPI) equation.       Calcium   Date Value Ref Range Status   03/24/2023 9.2 8.8 - 10.2 mg/dL Final   05/17/2021 9.3 8.5 - 10.1 mg/dL Final     Bilirubin Total   Date Value Ref Range Status   03/24/2023 0.3 <=1.2 mg/dL Final   05/17/2021 0.4 0.2 - 1.3 mg/dL Final     Alkaline Phosphatase   Date Value Ref Range Status   03/24/2023 76 35 - 104 U/L Final   05/17/2021 88 40 - 150 U/L Final     ALT   Date Value Ref Range Status   03/24/2023 18 10 - 35 U/L Final   05/17/2021 26 0 - 50 U/L Final     AST   Date Value Ref Range Status   03/24/2023 34 10 - 35 U/L Final   05/17/2021 20 0 - 45 U/L Final       PATHOLOGY:  None new.    IMAGING:  Reviewed as per HPI.    ASSESSMENT/PLAN:  Angeli Jacobson is a 72 year old female with the following issues:  1. Pathologic prognostic stage IA, [id number], grade 2 invasive ductal carcinoma of the right upper outer breast, ER positive (%), AZ positive (70%), HER2/compa FISH negative  -Angeli is s/p right lumpectomy and radiation completed 3/21/2023.    --I advised a total of 5 years of hormone blockade therapy with either .  --I reiterated the potential side effects of anastrozole, including but not limited to: fatigue, myalgias/arthralgias, bone density loss, decrease sexual drive, vaginal dryness, nausea, headache, difficulty sleeping, hot flashes, night sweats, small cardiovascular risk.  --Tamoxifen would be less optimal given concern for increased risk of blood clots.  --She expresses initial hesitance about starting hormone blockade therapy due to potential adverse effects. We talked through the possibility of trying letrozole if she does not tolerate anastrozole or discontinue hormone blockade altogether if she does not tolerate it.  --Ultimately, she has agreed to try anastrozole.  --She has right diagnostic mammogram already scheduled for 5/16/2023.  --Due for next annual bilateral mammogram for 11/2023.    2.  History of myxoid liposarcoma of left thigh  -  "Status post excision and radiation completed in 2000.  She did not receive chemotherapy for her liposarcoma.  This has resulted in left lower extremity lymphedema.  - Continue lymphedema therapy and compression stockings.    3.  Peripheral arterial disease  -Has history of left lower extremity peripheral arterial disease and is status post femoral-popliteal bypass with later stent to bypass.  She is on aspirin and Plavix.    4. Osteoporosis  --History of taking alendronate, off for past year.  - DEXA scan from 12/22/2022 showed osteoporosis.  - Continue adequate calcium and vitamin D, denosumab/Prolia, next due 7/10/2023.    Return in 2-3 months.    Emilia Angulo MD  Hematology/Oncology  [facility]    Total time spent: 30 minutes in patient evaluation, counseling, documentation, and coordination of care.     Oncology Rooming Note    April 10, 2023 1:28 PM   Angeli Jacobson is a 73 year old female who presents for:    Chief Complaint   Patient presents with     Oncology Clinic Visit     Initial Vitals: Resp 16   Ht 1.676 m (5' 6\")   Wt 71.2 kg (157 lb)   LMP 03/18/2005   BMI 25.34 kg/m   Estimated body mass index is 25.34 kg/m  as calculated from the following:    Height as of this encounter: 1.676 m (5' 6\").    Weight as of this encounter: 71.2 kg (157 lb). Body surface area is 1.82 meters squared.  No Pain (0) Comment: Data Unavailable   Patient's last menstrual period was 03/18/2005.  Allergies reviewed: Yes  Medications reviewed: Yes    Medications: Medication refills not needed today.  Pharmacy name entered into [employer]: [id number] [place], [place] - [address] [place] [address]    Clinical concerns:  doctor was notified.      Bharti Moreland MA                Again, thank you for allowing me to participate in the care of your patient.        Sincerely,        Emilia Angulo MD    "

## 2023-04-10 NOTE — TELEPHONE ENCOUNTER
LOV 4/7/23 with Dr. Owens.     Routing to  to coordinate 6 week from 4/7/23 f/u U/S Lower extremity arterial duplex Left and in clinic OV with Dr. Owens.     Appt note: 6 week f/u; hx of left femoral to above knee popliteal with PTFE bypass (US LE arterial Left to be scheduled prior).     JANE Mcmahon, RN  [facility]  Office:  [phone] Fax: [phone]

## 2023-04-10 NOTE — PROGRESS NOTES
"Oncology Rooming Note    April 10, 2023 1:28 PM   Angeli Jacobson is a 73 year old female who presents for:    Chief Complaint   Patient presents with     Oncology Clinic Visit     Initial Vitals: Resp 16   Ht 1.676 m (5' 6\")   Wt 71.2 kg (157 lb)   LMP 03/18/2005   BMI 25.34 kg/m   Estimated body mass index is 25.34 kg/m  as calculated from the following:    Height as of this encounter: 1.676 m (5' 6\").    Weight as of this encounter: 71.2 kg (157 lb). Body surface area is 1.82 meters squared.  No Pain (0) Comment: Data Unavailable   Patient's last menstrual period was 03/18/2005.  Allergies reviewed: Yes  Medications reviewed: Yes    Medications: Medication refills not needed today.  Pharmacy name entered into [employer]: [id number] [place] [address]    Clinical concerns:  doctor was notified.      Bharti Moreland MA            "

## 2023-04-10 NOTE — PATIENT INSTRUCTIONS
Arrange for Prolia for 7/10/2023 with lab draw.  RTC MD in 2-3 months.  Start anastrozole 1 mg orally daily.

## 2023-04-11 NOTE — TELEPHONE ENCOUNTER
Future Appointments   Date Time Provider Department Center   5[facility]12[facility]2023 10:[id number]   5/12/2023 11:10 AM Shade Owens MD [facility]

## 2023-04-18 ENCOUNTER — ONCOLOGY VISIT (OUTPATIENT)
Dept: ONCOLOGY | Facility: CLINIC | Age: 73
End: 2023-04-18
Attending: INTERNAL MEDICINE
Payer: COMMERCIAL

## 2023-04-18 VITALS
HEART RATE: 73 BPM | TEMPERATURE: 97.9 F | BODY MASS INDEX: 24.99 KG/M2 | DIASTOLIC BLOOD PRESSURE: 76 MMHG | RESPIRATION RATE: 16 BRPM | WEIGHT: 154.8 LBS | SYSTOLIC BLOOD PRESSURE: 128 MMHG | OXYGEN SATURATION: 97 %

## 2023-04-18 DIAGNOSIS — I89.0 LYMPHEDEMA OF LEFT LEG: ICD-10-CM

## 2023-04-18 DIAGNOSIS — Z17.0 MALIGNANT NEOPLASM OF UPPER-OUTER QUADRANT OF RIGHT BREAST IN FEMALE, ESTROGEN RECEPTOR POSITIVE (H): Primary | ICD-10-CM

## 2023-04-18 DIAGNOSIS — R53.81 PHYSICAL DECONDITIONING: ICD-10-CM

## 2023-04-18 DIAGNOSIS — M81.0 AGE-RELATED OSTEOPOROSIS WITHOUT CURRENT PATHOLOGICAL FRACTURE: ICD-10-CM

## 2023-04-18 DIAGNOSIS — Z96.652 STATUS POST TOTAL LEFT KNEE REPLACEMENT: ICD-10-CM

## 2023-04-18 DIAGNOSIS — C50.411 MALIGNANT NEOPLASM OF UPPER-OUTER QUADRANT OF RIGHT BREAST IN FEMALE, ESTROGEN RECEPTOR POSITIVE (H): Primary | ICD-10-CM

## 2023-04-18 DIAGNOSIS — Z85.831 HISTORY OF SARCOMA OF SOFT TISSUE: ICD-10-CM

## 2023-04-18 PROCEDURE — 99215 OFFICE O/P EST HI 40 MIN: CPT | Performed by: STUDENT IN AN ORGANIZED HEALTH CARE EDUCATION/TRAINING PROGRAM

## 2023-04-18 PROCEDURE — G0463 HOSPITAL OUTPT CLINIC VISIT: HCPCS | Performed by: STUDENT IN AN ORGANIZED HEALTH CARE EDUCATION/TRAINING PROGRAM

## 2023-04-18 ASSESSMENT — PAIN SCALES - GENERAL: PAINLEVEL: NO PAIN (0)

## 2023-04-18 NOTE — PATIENT INSTRUCTIONS
1.  Continue daily compression and twice weekly wraps.  2.  Continue your lymphatic massage techniques, try to implement them daily or twice daily.  3.  Continue to follow with lymphedema therapy.  4.  A referral was placed to Dr. Mckee for evaluation of your left knee.  5.  Follow-up with Dr. Sharma in 4 to 6 months.

## 2023-04-18 NOTE — PROGRESS NOTES
[facility]   PM&R clinic note        Interval history:     Angeli Jacobson presents to clinic today for follow up reg her rehab needs.   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   Was last seen in clinic on 1/10/23.  Recommendations included:  Recommendations:  7. Work-up: None today  8. Therapy/equipment/braces:   - Continue with the lymphedema wraps during the day. Follow previous PT recommendations for any further instructions on when to remove and keep on.  - Continue with HEP as recommended by therapies, would like to focus on those strengthening and stretching.   9. Medications: continue current medications  10. Interventions: None today  11. Referral / follow up with other providers: Follow-up with orthopedic surgery for right knee effusion.  12. Follow up: RTC in 3 months    Oncologic history:  1.  11/7/2022: Screening mammogram showed asymmetry in the right breast at 12:00, retroareolar far posterior.  Left breast negative.  2.  11/15/2022: Diagnostic right mammogram showed asymmetry in the posterior right breast, 8 cm from nipple.  Ultrasound of right breast at 12:00, 2 cm from nipple showed a small benign cyst but no definite sonographic correlate for the mammographic asymmetry.  3.  11/16/2022: Stereotactic guided right breast needle biopsy of 8 mm lesion at 3:00, 8 cm from the nipple showed grade 2 invasive ductal carcinoma, ER positive at %, IA positive at 60-70%, HER2 IHC = 2+ equivocal, HER2/compa FISH negative.  4. 12/14/2022: Underwent right breast lumpectomy with right axillary sentinel lymph node excision with Dr. Shelly Carr.   Pathology showed no residual invasive malignancy; 2 microscopic foci of DCIS, grade 2, largest measuring 1.2 mm; margins negative; total 2 right axillary lymph nodes negative.  5. 2/10/23- Follow up visit with Dr. Angulo. Recommend total of 5 years of hormone blockade therapy. Agreed to try anastrozole. Continue lymphedema therapy and compression stockings for LLE lymphedema.       Symptoms,  Patient was seen for a return visit today.  Her  is present for the visit.  She states that overall she has been doing okay since her last visit.  She continues to have left lower extremity edema, and continues to wrap a few times a week.  She has also been periodically following up with lymphedema therapy, and has another visit upcoming in the first week of May.  She is continuing to do the lymphatic massage, sometimes not daily.  She feels that her swelling overall has progressed from being firm to softer which she takes is a good sign.    Since her last visit, she was seen at [facility] orthopedics by her orthopedic provider for ongoing significant swelling in her left knee postoperatively.  There is a significant amount of fluid in the left knee and they feel like they have not been evaluated to determine if the fluid level is too much or if it is normal in terms of being expected postoperatively.  She states that the fluid level does impair her knee range of motion and interferes with daily activities.  She denies any pain or significant discomfort as a result of the swelling, but it is inhibiting with the amount of swelling that is there.  She has been doing acupuncture, and has had 5 sessions and started a month ago once weekly.  She feels like the acupuncture is really helping overall with her symptomatology.      Therapies/HEP,  Continues to periodically see lymphedema therapy.      Functionally,   Remains modified independent using a cane for ambulation, and independent for ADLs and IADLs with the assist of  family as needed.      Social history is unchanged.      Medications:  Current Outpatient Medications   Medication Sig Dispense Refill     anastrozole (ARIMIDEX) 1 MG tablet Take 1 tablet (1 mg) by mouth daily 90 tablet 3     calcium carbonate 600 mg-vitamin D 400 units (CALTRATE) 600-400 MG-UNIT per tablet Take 1 chew tab by mouth daily       clopidogrel (PLAVIX) 75 MG tablet Take 1 tablet (75 mg) by mouth daily 90 tablet 3     econazole nitrate 1 % external cream Apply topically daily (Patient taking differently: Apply topically daily Toes) 85 g 3     evolocumab (REPATHA) 140 MG/ML prefilled autoinjector Inject 1 mL (140 mg) Subcutaneous every 14 days 6 mL 3     ezetimibe (ZETIA) 10 MG tablet Take 1 tablet (10 mg) by mouth daily 90 tablet 3     levothyroxine (SYNTHROID/LEVOTHROID) 75 MCG tablet Take 1 tablet (75 mcg) by mouth daily 90 tablet 3     multivitamin, therapeutic (THERA-VIT) TABS tablet Take 1 tablet by mouth daily       nitroFURantoin macrocrystal-monohydrate (MACROBID) 100 MG capsule TAKE 1 CAPSULE BY MOUTH AFTER INTRCOURSE 30 capsule 3     rivaroxaban ANTICOAGULANT (XARELTO) 2.5 MG TABS tablet Take 1 tablet (2.5 mg) by mouth 2 times daily 60 tablet 5     rosuvastatin (CRESTOR) 20 MG tablet Take 1 tablet (20 mg) by mouth daily 90 tablet 3     solifenacin (VESICARE) 5 MG tablet Take 1 tablet (5 mg) by mouth daily 90 tablet 3              Physical Exam:   [facility] 03/18/2005   Gen: NAD, pleasant and cooperative   HEENT: MMM  Cardio: regular pulse  Pulm: non-labored breathing in room air  Abd: benign  Ext: Mild lymphedema in LLE extending from thigh to foot, improved from previous visit.. Scar tissue and tightness noted at anterior left thigh. Positive Stemmer sign on left.   No erythema, warmth noted.  Well-healed surgical incision over left knee.  Right knee with no surrounding erythema, some increased swelling over knee joint.  Fluid shift test positive for effusion.  Neuro/MSK: Limited ROM w/ hip flexion,  0 degrees in full knee extension and knee flexion to 90 degrees. ROM improved at knee joint.     Labs/Imaging:  Lab Results   Component Value Date    WBC 5.6 12/08/2022    HGB 13.5 12/08/2022    HCT 42.5 12/08/2022    MCV 94 12/08/2022     12/08/2022     Lab Results   Component Value Date     03/24/2023    POTASSIUM 4.3 03/24/2023    CHLORIDE 106 03/24/2023    CO2 23 03/24/2023    GLC 91 03/24/2023     Lab Results   Component Value Date    GFRESTIMATED >90 03/24/2023    GFRESTBLACK >90 05/17/2021     Lab Results   Component Value Date    AST 34 03/24/2023    ALT 18 03/24/2023    ALKPHOS 76 03/24/2023    BILITOTAL 0.3 03/24/2023    BILICONJ 0.0 06/06/2006     Lab Results   Component Value Date    INR 1.01 09/27/2019     Lab Results   Component Value Date    BUN 13.4 03/24/2023    CR 0.64 03/24/2023              Assessment/Plan   Angelinydia Jacobson presents to clinic today for follow up reg his/her rehab needs.   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   Was last seen in clinic on 1/10/23.  Multiple rehabilitation recommendations were discussed with Angeli today.  She should plan to follow-up with lymphedema therapy as scheduled for ongoing recommendations to any changes in her current regimen.  She was instructed about the importance of daily or twice daily lymphatic massage to help with reduction in fluid levels in her lower extremity in addition to the compression regimen that she has.  She should continue alternating between compression stockings and wraps, and changes needed based on therapy advice.  In regards to her left knee, she is interested in another opinion and so we  will place a referral to Dr. Mckee for evaluation of her left knee and any recommendations that he might have.  We will plan a return visit in 4 to 6 months.  Angeli is in agreement with this plan.      1. Therapy/equipment/braces:  1. Continue lymphedema therapy.  2. Continue daily compression and rest.  3. Continue manual lymphatic drainage, try to aim for once or twice daily.  2. Referral / follow up with other providers:  1. Referral placed to Dr. Mckee for evaluation of left knee.  3. Follow up: 4 to 6 months.      Leeann Sharma MD  Physical Medicine & Rehabilitation      50 minutes spent on the date of the encounter doing chart review, history and exam, documentation and further activities as noted above.

## 2023-04-18 NOTE — LETTER
4/18/2023         RE: Angeli Jacobson  [address]  [facility] [id number]        Dear Colleague,    Thank you for referring your patient, Angeli Jacobson, to the [facility]. Please see a copy of my visit note below.    [facility]   PM&R clinic note        Interval history:     Angeli Jacobson presents to clinic today for follow up reg her rehab needs.   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   Was last seen in clinic on 1/10/23.  Recommendations included:  Recommendations:  7. Work-up: None today  8. Therapy/equipment/braces:   - Continue with the lymphedema wraps during the day. Follow previous PT recommendations for any further instructions on when to remove and keep on.  - Continue with HEP as recommended by therapies, would like to focus on those strengthening and stretching.   9. Medications: continue current medications  10. Interventions: None today  11. Referral / follow up with other providers: Follow-up with orthopedic surgery for right knee effusion.  12. Follow up: RTC in 3 months    Oncologic history:  1.  11/7/2022: Screening mammogram showed asymmetry in the right breast at 12:00, retroareolar far posterior.  Left breast negative.  2.  11/15/2022: Diagnostic right mammogram showed asymmetry in the posterior right breast, 8 cm from nipple.  Ultrasound of right breast at 12:00, 2 cm from nipple showed a small benign cyst but no definite sonographic correlate for the mammographic asymmetry.  3.  11/16/2022: Stereotactic guided right breast needle biopsy of 8 mm lesion at 3:00, 8 cm from the  nipple showed grade 2 invasive ductal carcinoma, ER positive at %, MO positive at 60-70%, HER2 IHC = 2+ equivocal, HER2/compa FISH negative.  4. 12/14/2022: Underwent right breast lumpectomy with right axillary sentinel lymph node excision with Dr. Shelly Carr.  Pathology showed no residual invasive malignancy; 2 microscopic foci of DCIS, grade 2, largest measuring 1.2 mm; margins negative; total 2 right axillary lymph nodes negative.  5. 2/10/23- Follow up visit with Dr. Angulo. Recommend total of 5 years of hormone blockade therapy. Agreed to try anastrozole. Continue lymphedema therapy and compression stockings for LLE lymphedema.       Symptoms,  Patient was seen for a return visit today.  Her  is present for the visit.  She states that overall she has been doing okay since her last visit.  She continues to have left lower extremity edema, and continues to wrap a few times a week.  She has also been periodically following up with lymphedema therapy, and has another visit upcoming in the first week of May.  She is continuing to do the lymphatic massage, sometimes not daily.  She feels that her swelling overall has progressed from being firm to softer which she takes is a good sign.    Since her last visit, she was seen at [facility] orthopedics by her orthopedic provider for ongoing significant swelling in her left knee postoperatively.  There is a significant amount of fluid in the left knee and they feel like they have not been evaluated to determine if the fluid level is too much or if it is normal in terms of being expected postoperatively.  She states that the fluid level does impair her knee range of motion and interferes with daily activities.  She denies any pain or significant discomfort as a result of the swelling, but it is inhibiting with the amount of swelling that is there.  She has been doing acupuncture, and has had 5 sessions and started a month ago once weekly.  She feels like the  acupuncture is really helping overall with her symptomatology.      Therapies/HEP,  Continues to periodically see lymphedema therapy.      Functionally,   Remains modified independent using a cane for ambulation, and independent for ADLs and IADLs with the assist of family as needed.      Social history is unchanged.      Medications:  Current Outpatient Medications   Medication Sig Dispense Refill     anastrozole (ARIMIDEX) 1 MG tablet Take 1 tablet (1 mg) by mouth daily 90 tablet 3     calcium carbonate 600 mg-vitamin D 400 units (CALTRATE) 600-400 MG-UNIT per tablet Take 1 chew tab by mouth daily       clopidogrel (PLAVIX) 75 MG tablet Take 1 tablet (75 mg) by mouth daily 90 tablet 3     econazole nitrate 1 % external cream Apply topically daily (Patient taking differently: Apply topically daily Toes) 85 g 3     evolocumab (REPATHA) 140 MG/ML prefilled autoinjector Inject 1 mL (140 mg) Subcutaneous every 14 days 6 mL 3     ezetimibe (ZETIA) 10 MG tablet Take 1 tablet (10 mg) by mouth daily 90 tablet 3     levothyroxine (SYNTHROID/LEVOTHROID) 75 MCG tablet Take 1 tablet (75 mcg) by mouth daily 90 tablet 3     multivitamin, therapeutic (THERA-VIT) TABS tablet Take 1 tablet by mouth daily       nitroFURantoin macrocrystal-monohydrate (MACROBID) 100 MG capsule TAKE 1 CAPSULE BY MOUTH AFTER INTRCOURSE 30 capsule 3     rivaroxaban ANTICOAGULANT (XARELTO) 2.5 MG TABS tablet Take 1 tablet (2.5 mg) by mouth 2 times daily 60 tablet 5     rosuvastatin (CRESTOR) 20 MG tablet Take 1 tablet (20 mg) by mouth daily 90 tablet 3     solifenacin (VESICARE) 5 MG tablet Take 1 tablet (5 mg) by mouth daily 90 tablet 3              Physical Exam:   [facility] 03/18/2005   Gen: NAD, pleasant and cooperative   HEENT: MMM  Cardio: regular pulse  Pulm: non-labored breathing in room air  Abd: benign  Ext: Mild lymphedema in LLE extending from thigh to foot, improved from previous visit.. Scar tissue and tightness noted at anterior left thigh.  Positive Stemmer sign on left.   No erythema, warmth noted.  Well-healed surgical incision over left knee.  Right knee with no surrounding erythema, some increased swelling over knee joint.  Fluid shift test positive for effusion.  Neuro/MSK: Limited ROM w/ hip flexion, 0 degrees in full knee extension and knee flexion to 90 degrees. ROM improved at knee joint.     Labs/Imaging:  Lab Results   Component Value Date    WBC 5.6 12/08/2022    HGB 13.5 12/08/2022    HCT 42.5 12/08/2022    MCV 94 12/08/2022     12/08/2022     Lab Results   Component Value Date     03/24/2023    POTASSIUM 4.3 03/24/2023    CHLORIDE 106 03/24/2023    CO2 23 03/24/2023    GLC 91 03/24/2023     Lab Results   Component Value Date    GFRESTIMATED >90 03/24/2023    GFRESTBLACK >90 05/17/2021     Lab Results   Component Value Date    AST 34 03/24/2023    ALT 18 03/24/2023    ALKPHOS 76 03/24/2023    BILITOTAL 0.3 03/24/2023    BILICONJ 0.0 06/06/2006     Lab Results   Component Value Date    INR 1.01 09/27/2019     Lab Results   Component Value Date    BUN 13.4 03/24/2023    CR 0.64 03/24/2023              Assessment/Plan   Angeli Jacobson presents to clinic today for follow up reg his/her rehab needs.   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   Was last seen in clinic on 1/10/23.  Multiple rehabilitation recommendations were discussed with Angeli today.  She should plan to follow-up with lymphedema therapy as scheduled for ongoing recommendations to any changes in her current regimen.  She was instructed about the importance of daily or twice daily lymphatic massage to help with reduction in fluid levels  "in her lower extremity in addition to the compression regimen that she has.  She should continue alternating between compression stockings and wraps, and changes needed based on therapy advice.  In regards to her left knee, she is interested in another opinion and so we will place a referral to Dr. Mckee for evaluation of her left knee and any recommendations that he might have.  We will plan a return visit in 4 to 6 months.  Angeli is in agreement with this plan.      1. Therapy/equipment/braces:  1. Continue lymphedema therapy.  2. Continue daily compression and rest.  3. Continue manual lymphatic drainage, try to aim for once or twice daily.  2. Referral / follow up with other providers:  1. Referral placed to Dr. Mckee for evaluation of left knee.  3. Follow up: 4 to 6 months.      Leeann Sharma MD  Physical Medicine & Rehabilitation      50 minutes spent on the date of the encounter doing chart review, history and exam, documentation and further activities as noted above.            Oncology Rooming Note    April 18, 2023 2:12 PM   Angeli Jacobson is a 73 year old female who presents for:    Chief Complaint   Patient presents with     Oncology Clinic Visit     Initial Vitals: LMP 03/18/2005  Estimated body mass index is 25.34 kg/m  as calculated from the following:    Height as of 4/10/23: 1.676 m (5' 6\").    Weight as of 4/10/23: 71.2 kg (157 lb). There is no height or weight on file to calculate BSA.  Data Unavailable Comment: Data Unavailable   Patient's last menstrual period was 03/18/2005.  Allergies reviewed: Yes  Medications reviewed: Yes    Medications: Medication refills not needed today.  Pharmacy name entered into [email or web address]: [id number] [place] [address]    Clinical concerns: no       Shari J. Schoenberger, [facility]                Again, thank you for allowing me to participate in the care of your patient.        Sincerely,        Leeann Sharma MD    "

## 2023-04-18 NOTE — PROGRESS NOTES
"Oncology Rooming Note    April 18, 2023 2:12 PM   Angeli Jacobson is a 73 year old female who presents for:    Chief Complaint   Patient presents with     Oncology Clinic Visit     Initial Vitals: LMP 03/18/2005  Estimated body mass index is 25.34 kg/m  as calculated from the following:    Height as of 4/10/23: 1.676 m (5' 6\").    Weight as of 4/10/23: 71.2 kg (157 lb). There is no height or weight on file to calculate BSA.  Data Unavailable Comment: Data Unavailable   Patient's last menstrual period was 03/18/2005.  Allergies reviewed: Yes  Medications reviewed: Yes    Medications: Medication refills not needed today.  Pharmacy name entered into [employer]: [id number] [place], [place] - [address] [place] [address]    Clinical concerns: no       Shari J. Schoenberger, EMELY            "

## 2023-04-19 ENCOUNTER — TELEPHONE (OUTPATIENT)
Dept: PHYSICAL MEDICINE AND REHAB | Facility: CLINIC | Age: 73
End: 2023-04-19

## 2023-04-19 NOTE — TELEPHONE ENCOUNTER
[facility] Call Center    Phone Message    May a detailed message be left on voicemail: yes     Reason for Call: Hello, this patient is being referred by  to Dr. Mckee to have her knee eval- Does he see paients for knees as well?    Action Taken: Other: p phyiscal    Travel Screening: Not Applicable

## 2023-04-24 ENCOUNTER — TRANSFERRED RECORDS (OUTPATIENT)
Dept: HEALTH INFORMATION MANAGEMENT | Facility: CLINIC | Age: 73
End: 2023-04-24

## 2023-04-26 ENCOUNTER — PATIENT OUTREACH (OUTPATIENT)
Dept: CARE COORDINATION | Facility: CLINIC | Age: 73
End: 2023-04-26
Payer: COMMERCIAL

## 2023-05-01 ENCOUNTER — VIRTUAL VISIT (OUTPATIENT)
Dept: PEDIATRICS | Facility: CLINIC | Age: 73
End: 2023-05-01
Payer: COMMERCIAL

## 2023-05-01 DIAGNOSIS — N39.0 RECURRENT UTI: ICD-10-CM

## 2023-05-01 DIAGNOSIS — I73.9 PAD (PERIPHERAL ARTERY DISEASE) (H): ICD-10-CM

## 2023-05-01 PROCEDURE — 99214 OFFICE O/P EST MOD 30 MIN: CPT | Mod: 95 | Performed by: NURSE PRACTITIONER

## 2023-05-01 NOTE — PROGRESS NOTES
The pt called back and she declined scheduling an appt at this time. She will stop by the lab tomorrow around her PT appointment.   Vivi Maravilla on 5/1/2023 at 2:50 PM

## 2023-05-01 NOTE — PROGRESS NOTES
Angeli is a 73 year old who is being evaluated via a billable telephone visit.        Distant Location (provider location):  Off-site    Assessment & Plan     (N39.0) Recurrent UTI  Comment: Has a hx OAB and recurrent UTIs. Developed UTI type sx a week ago. She did have intercourse 24 hours ago and took one prophylactic macrobid less than 24 hours ago. Still having symptoms. No sx to suggest pyelo or severe infection.   Plan:   -She will wait until tomorrow to leave a urine sample because she already has a PT appt at our clinic. This will have the added benefit of being farther from the macrobid dose to hopefully offer a more accurate sample. If negative, I would still recommend treatment, as it's possible the macrobid is obscuring the sample.   -Reviewed signs of pyelo and reasons to seek care urgently. I think she's at low risk of pyelo because she's never had it and doesn't currently have sx of severe/complicated infection  -Note: Previous resistance to bactrim.     (I73.9) PAD (peripheral artery disease) (H)  Comment: Stable. Followed by primary    ELVA HIGHTOWER CNP [facility]    Careml Suh is a 73 year old, presenting for the following health issues:  No chief complaint on file.  Strong urge, urgency  No burning  No gross blood in urine  No fevers  Some suprapubic discomfort  No flank pain, nausea, malaise, fever    Took a macrobrid in the last day-prophylaxis with intercourse.       View : No data to display.              HPI     Review of Systems   Gi/gu/gyn      Objective           Vitals:  No vitals were obtained today due to virtual visit.    Physical Exam   N/A    Phone call duration: 10 minutes      Answers for HPI/ROS submitted by the patient on 5/1/2023  How many servings of fruits and vegetables do you eat daily?: 2-3  On average, how many sweetened beverages do you drink each day (Examples: soda, juice, sweet tea, etc.  Do NOT count diet or  artificially sweetened beverages)?: 0  How many minutes a day do you exercise enough to make your heart beat faster?: 20 to 29  How many days a week do you exercise enough to make your heart beat faster?: 3 or less  How many days per week do you miss taking your medication?: 0  What is the reason for your visit today?: Checking for a UTI  When did your symptoms begin?: 1-2 weeks ago  What are your symptoms?: Strong urging to urinate  How would you describe these symptoms?: Severe  Are your symptoms:: Staying the same  Have you had these symptoms before?: Yes  Have you tried or received treatment for these symptoms before?: Yes  Did that treatment work? : Yes  Please describe the treatment you had:: I do not recall the prescription  Is there anything that makes you feel worse?: no  Is there anything that makes you feel better?: no

## 2023-05-01 NOTE — PROGRESS NOTES
1st attempt l/m. See provider notes below.       Needs lab for UA tomorrow morning if possible around 9:30 (has an appt in the morning with PT)

## 2023-05-02 ENCOUNTER — LAB (OUTPATIENT)
Dept: LAB | Facility: CLINIC | Age: 73
End: 2023-05-02
Payer: COMMERCIAL

## 2023-05-02 ENCOUNTER — HOSPITAL ENCOUNTER (OUTPATIENT)
Dept: PHYSICAL THERAPY | Facility: CLINIC | Age: 73
Discharge: HOME | End: 2023-05-02
Payer: COMMERCIAL

## 2023-05-02 DIAGNOSIS — L90.5 SCAR CONDITION AND FIBROSIS OF SKIN: ICD-10-CM

## 2023-05-02 DIAGNOSIS — M25.60 DECREASED RANGE OF MOTION: ICD-10-CM

## 2023-05-02 DIAGNOSIS — I89.0 LYMPHEDEMA OF LEFT LEG: Primary | ICD-10-CM

## 2023-05-02 DIAGNOSIS — R53.1 DECREASED STRENGTH: ICD-10-CM

## 2023-05-02 DIAGNOSIS — R73.01 IMPAIRED FASTING GLUCOSE: ICD-10-CM

## 2023-05-02 DIAGNOSIS — E11.9 DIABETES MELLITUS (H): ICD-10-CM

## 2023-05-02 LAB
ALBUMIN UR-MCNC: NEGATIVE MG/DL
APPEARANCE UR: CLEAR
BILIRUB UR QL STRIP: NEGATIVE
COLOR UR AUTO: YELLOW
GLUCOSE UR STRIP-MCNC: NEGATIVE MG/DL
HGB UR QL STRIP: NEGATIVE
KETONES UR STRIP-MCNC: NEGATIVE MG/DL
LEUKOCYTE ESTERASE UR QL STRIP: NEGATIVE
NITRATE UR QL: NEGATIVE
PH UR STRIP: 7 [PH] (ref 5–7)
SP GR UR STRIP: 1.02 (ref 1–1.03)
UROBILINOGEN UR STRIP-ACNC: 0.2 E.U./DL

## 2023-05-02 PROCEDURE — 97162 PT EVAL MOD COMPLEX 30 MIN: CPT | Mod: GP | Performed by: PHYSICAL THERAPIST

## 2023-05-02 PROCEDURE — 97110 THERAPEUTIC EXERCISES: CPT | Mod: GP | Performed by: PHYSICAL THERAPIST

## 2023-05-02 PROCEDURE — 81003 URINALYSIS AUTO W/O SCOPE: CPT | Performed by: NURSE PRACTITIONER

## 2023-05-02 PROCEDURE — 97140 MANUAL THERAPY 1/> REGIONS: CPT | Mod: GP | Performed by: PHYSICAL THERAPIST

## 2023-05-02 RX ORDER — NITROFURANTOIN 25; 75 MG/1; MG/1
100 CAPSULE ORAL 2 TIMES DAILY
Qty: 14 CAPSULE | Refills: 0 | Status: SHIPPED | OUTPATIENT
Start: 2023-05-02 | End: 2023-05-09

## 2023-05-02 NOTE — PROGRESS NOTES
05/02/23 0700   Quick Adds   Quick Adds Certification   Rehab Discipline   Discipline PT   Type of Visit   Type of visit Initial Edema Evaluation   General Information   Start of care 05/02/23   Referring physician Dr. Leeann Sharma MD   Orders   (self referred)   Medical diagnosis lymphedema   Edema onset 01/01/23  (exacerbation due to lumpectomy surgery 12/14/2022)   Affected body parts LLE;RUE;Trunk   Edema etiology Cancer with lymph node dissection;Radiation;Surgery   Location - Cancer with lymph node dissection L thigh and R breast   Location - Radiation L thigh; R breast   Radiation comments R breast extending into axilla x 16 completed end of March 2023; L LE completed 2000   Edema etiology comments trip to [place] February 2023 x 3 weeks   Pertinent history of current problem (PT: include personal factors and/or comorbidities that impact the POC; OT: include additional occupational profile info) 12/14/2022 s/p R lumpectomy and SLND -2/2 with recommended 5years hormone blocker Anastrozole; h/o  LLE peripheral artery disease s/p femoral-popliteal bypass along w/ stent to the bypass later, on DAPT, 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   Surgical / medical history reviewed Yes   Prior level of functional mobility I ADLs; able to walk 1/2 - 1mile using SEC 2x/week slowly   Prior treatment Complete decongestive therapy;Compression garments;Exercise;Elevation;MLD;Gradient compression bandaging;Compression pump  (using pump 2x/week and GCB x 1/week x 12 hours during day due to hindering sleep)   Community support Family / friend caregiver  ()   Patient role / employment history Retired   Psychosocial concerns Impaired sleep  (reporting turning in bed L knee stiffness)   Living  "environment [facility]   Current assistive devices Standard cane   Assistive device comments using SEC for ambulation   Fall Risk Screen   Fall screen completed by PT   Have you fallen 2 or more times in the past year? No   Have you fallen and had an injury in the past year? No   Is patient a fall risk? Yes;Department fall risk interventions implemented   Abuse Screen (yes response referral indicated)   Feels Unsafe at Home or Work/School no   Feels Threatened by Someone no   Does Anyone Try to Keep You From Having Contact with Others or Doing Things Outside Your Home? no   Physical Signs of Abuse Present no   Patient needs abuse support services and resources No   System Outcome Measures   Outcome Measures Lymphedema   Lymphedema Life Impact Scale (score range 0-72). A higher score indicates greater impairment. 22   Subjective Report   Patient report of symptoms concerned with exacerbation of L LE lymphedema during R breast cancer as increased difficulty performing home program for L LE lymphedema   Precautions   Precautions comments osteoporosis   Patient / Family Goals   Patient / family goals statement update current Home program to assure optimal   Pain   Patient currently in pain Yes   Pain location L knee   Pain rating discomfort and stiffness   Vitals Signs   Weight 5'6\"; 154lbs   Cognitive Status   Orientation Orientation to person, place and time   Edema Exam / Assessment   Skin condition Pitting;Intact   Skin condition comments mild errythema L lower leg   Pitting 2+   Pitting location pretibial; also continues to demonstrate nonpitting puffy edema of L knee   Scar Yes   Location L knee and L thigh   Mobility moderately and significantly  adhered L thigh   Capillary refill Symmetrical   Stemmer sign Negative   Girth Measurements   Girth Measurements Refer to separate girth measurement flowsheet   Volume LE   Right LE (mL) to 70cm = 6452ml   Left LE (mL) 7629ml; increased 6.4% from November 2023   LE " volume comparison LLE volume greater than RLE volume   % difference 18%   Range of Motion   ROM comments L knee flexion 92; L DF = 3 degrees   Strength   Strength comments L>R GS weakness; L HR = 0; R HR - limited excursion   Posture   Posture Forward head position;Protracted shoulders   Activities of Daily Living   Activities of Daily Living Ind   Gait / Locomotion   Gait / Locomotion continues to demonstrate mild antalgic gait L LE   Sensory   Sensory perception comments decreased L lower leg and foot   Vascular Assessment   Vascular Assessment Comments reporting narrowing L graftL  h/o PAD but scheduling angiogram   Planned Edema Interventions   Planned edema interventions Manual lymph drainage;Gradient compression bandaging;Fit for compression garment;Exercises;Precautions to prevent infection / exacerbation;Education;Manual therapy;ADL training;Skin care / precautions;Scar mobilization;Soft tissue mobilization;Myofascial release;Home management program development   Planned edema intervention comments focus on upgrading current home program   Clinical Impression   Criteria for skilled therapeutic intervention met Yes   Therapy diagnosis lymphedema, fibrosis; impaired strength and ROM   Influenced by the following impairments / conditions Edema;Stage 2;Other   Functional limitations due to impairments / conditions decreased gait and transfers; elevated risk of infection   Clinical Presentation Evolving/Changing   Clinical Presentation Rationale demonstrating worsenng L LE edema since last session and increased risk R UE/UQ   Clinical Decision Making (Complexity) Moderate complexity   Treatment Frequency Other (see comments)  (up to 10 sessions over 3 months; patient considering current treatment options)   Treatment duration 90 days   Patient / family and/or staff in agreement with plan of care Yes   Risks and benefits of therapy have been explained Yes   Clinical impression comments pt with chronic L LE  lymphedema demonstrating exacerbation of symptoms as well as recent increased risk of lymphedema of R UE/UQ s/p R lumpectomy 12/14/2022 and radiation completed 3/2023 and continued decreased ROM and strength and endurance and would beneift from therapy to achieve stated goals   Education Assessment   Preferred learning style Listening;Reading;Demonstration   Barriers to learning No barriers   Goal 1   Goal identifier Home program   Goal description Patient will be independent in home program to manage conditions of lymphedema and fibrosis L LE as well as knowledgeable regarding lymphedema risk reduction for R UE/UQ.   Target date 07/31/23   Goal 2   Goal identifier volume L LE   Goal description Patient will demonstrate decreased volume of L LE by 5% to decrease risk of infection and return to baseline status   Target date 07/01/23   Goal 3   Goal identifier LLIS   Goal description Patient will demonstrate an 8 point decrease (14 or less) in LLIS to demonstrate a decreased impact of lymphedema on function.   Target date 07/31/23   Total Evaluation Time   PT Eval, Moderate Complexity Minutes (61852) 30   Certification   Certification date from 05/02/23   Certification date to 07/31/23   Medical Diagnosis lymphedema   Certification I certify the need for these services furnished under this plan of treatment and while under my care.  (Physician co-signature of this document indicates review and certification of the therapy plan).

## 2023-05-02 NOTE — PROGRESS NOTES
[facility]        OUTPATIENT PHYSICAL THERAPY EDEMA and CANCER REHAB  EVALUATION  PLAN OF TREATMENT FOR OUTPATIENT REHABILITATION  (COMPLETE FOR INITIAL CLAIMS ONLY)  Patient's Last Name, First Name, Angeli Pond                           Provider s Name:   [facility] Medical Record No.  [id number]     Start of Care Date:  05/02/23   Onset Date:  01/01/23 (exacerbation due to lumpectomy surgery 12/14/2022)   Type:  PT   Medical Diagnosis:  lymphedema   Therapy Diagnosis:  lymphedema, fibrosis; impaired strength and ROM, and balance Visits from SOC:  1                                     __________________________________________________________________________________   Plan of Treatment/Functional Goals:    Manual lymph drainage, Gradient compression bandaging, Fit for compression garment, Exercises, Precautions to prevent infection / exacerbation, Education, Manual therapy, ADL training, Skin care / precautions, Scar mobilization, Soft tissue mobilization, Myofascial release, Home management program development  focus on upgrading current home program     GOALS  1. Goal description: Patient will be independent in home program to manage conditions of lymphedema and fibrosis L LE as well as knowledgeable regarding lymphedema risk reduction for R UE/UQ.       Target date: 07/31/23  2. Goal description: Patient will demonstrate decreased volume of L LE by 5% to decrease risk of infection and return to baseline status       Target date: 07/01/23  3. Goal description: Patient will demonstrate an 8 point decrease (14 or less) in LLIS to demonstrate a decreased impact of lymphedema on function.       Target date: 07/31/23            Treatment Frequency: Other (see comments) (up to 10 sessions over 3 months; patient considering current treatment options)    Treatment duration: 90 days    Mariana Guzman, PT                                    I CERTIFY THE NEED FOR THESE SERVICES FURNISHED UNDER        THIS PLAN OF TREATMENT AND WHILE UNDER MY CARE     (Physician co-signature of this document indicates review and certification of the therapy plan).                   Certification date from: 05/02/23       Certification date to: 07/31/23           Referring physician: Dr. Leeann Sharma MD   Initial Assessment  See Epic Evaluation- Start of care: 05/02/23

## 2023-05-03 ENCOUNTER — ANCILLARY PROCEDURE (OUTPATIENT)
Dept: GENERAL RADIOLOGY | Facility: CLINIC | Age: 73
End: 2023-05-03
Attending: PHYSICAL MEDICINE & REHABILITATION
Payer: COMMERCIAL

## 2023-05-03 ENCOUNTER — LAB (OUTPATIENT)
Dept: LAB | Facility: CLINIC | Age: 73
End: 2023-05-03
Payer: COMMERCIAL

## 2023-05-03 ENCOUNTER — OFFICE VISIT (OUTPATIENT)
Dept: PHYSICAL MEDICINE AND REHAB | Facility: CLINIC | Age: 73
End: 2023-05-03
Payer: COMMERCIAL

## 2023-05-03 VITALS
HEART RATE: 66 BPM | WEIGHT: 157 LBS | DIASTOLIC BLOOD PRESSURE: 83 MMHG | OXYGEN SATURATION: 96 % | SYSTOLIC BLOOD PRESSURE: 144 MMHG | HEIGHT: 66 IN | BODY MASS INDEX: 25.23 KG/M2

## 2023-05-03 DIAGNOSIS — Z85.831 HISTORY OF SARCOMA OF SOFT TISSUE: ICD-10-CM

## 2023-05-03 DIAGNOSIS — Z17.0 MALIGNANT NEOPLASM OF UPPER-OUTER QUADRANT OF RIGHT BREAST IN FEMALE, ESTROGEN RECEPTOR POSITIVE (H): ICD-10-CM

## 2023-05-03 DIAGNOSIS — E83.52 HYPERCALCEMIA: ICD-10-CM

## 2023-05-03 DIAGNOSIS — I89.0 LYMPHEDEMA OF LEFT LEG: ICD-10-CM

## 2023-05-03 DIAGNOSIS — M81.0 AGE-RELATED OSTEOPOROSIS WITHOUT CURRENT PATHOLOGICAL FRACTURE: ICD-10-CM

## 2023-05-03 DIAGNOSIS — R53.81 PHYSICAL DECONDITIONING: ICD-10-CM

## 2023-05-03 DIAGNOSIS — C50.411 MALIGNANT NEOPLASM OF UPPER-OUTER QUADRANT OF RIGHT BREAST IN FEMALE, ESTROGEN RECEPTOR POSITIVE (H): ICD-10-CM

## 2023-05-03 DIAGNOSIS — Z13.820 SCREENING FOR OSTEOPOROSIS: ICD-10-CM

## 2023-05-03 DIAGNOSIS — Z13.820 SCREENING FOR OSTEOPOROSIS: Primary | ICD-10-CM

## 2023-05-03 DIAGNOSIS — Z96.652 STATUS POST TOTAL LEFT KNEE REPLACEMENT: ICD-10-CM

## 2023-05-03 LAB — PTH-INTACT SERPL-MCNC: 108 PG/ML (ref 15–65)

## 2023-05-03 PROCEDURE — 72082 X-RAY EXAM ENTIRE SPI 2/3 VW: CPT

## 2023-05-03 PROCEDURE — 83970 ASSAY OF PARATHORMONE: CPT

## 2023-05-03 PROCEDURE — 36415 COLL VENOUS BLD VENIPUNCTURE: CPT

## 2023-05-03 PROCEDURE — 99214 OFFICE O/P EST MOD 30 MIN: CPT | Performed by: PHYSICAL MEDICINE & REHABILITATION

## 2023-05-03 ASSESSMENT — PAIN SCALES - GENERAL: PAINLEVEL: NO PAIN (0)

## 2023-05-03 NOTE — PROGRESS NOTES
"Angeli Jacobson is a 73 year old female who presents for:  Chief Complaint   Patient presents with     Consult     Physical changes associated with Left knee and Left Leg. Wants to know how much fluid is in knee        Initial Vitals:  BP (!) 144/83   Pulse 66   Ht 5' 5.5\" (1.664 m)   Wt 157 lb (71.2 kg)   LMP 03/18/2005   SpO2 96%   BMI 25.73 kg/m   Estimated body mass index is 25.73 kg/m  as calculated from the following:    Height as of this encounter: 5' 5.5\" (1.664 m).    Weight as of this encounter: 157 lb (71.2 kg).. Body surface area is 1.81 meters squared. BP completed using cuff size: regular  No Pain (0)    Nursing Comments: Patient was with their  and in good spirits.     Sheyla Bedoya MA    "

## 2023-05-03 NOTE — PROGRESS NOTES
[facility] Note     Patient Name: Angeli Jacobson : 1950 Medical Record: [id number]     Requesting Physician/clinician: No att. providers found           History of Present Illness:     Ms. Rossy Jacobson is a very pleasant lady accompanied by her  for this consultation.  She has past history of localized breast cancer which has been removed and there is no history of metastatic breast cancer lesions.  On review of her  MRI of pelvis the  diagnosis of insufficiency fractures of the left sacrum  And inferior and superior pubic rami had been brought up on 2022.  She also has a history of left lower extremity chronic lymphedema and is a status post fracture of the left patella.  She does not complain of pain in her pelvic area.  She walks with a limp and uses a cane in her right hand          Symptoms, antalgic gait favoring left lower extremity with denial of pain        Therapies/HEP/equipments, uses a cane in the right hand and has been advised to use a lift in the left shoe        Functionally, she is independent otherwise               Past Medical and Surgical History:     Past Medical History:   Diagnosis Date     * * * SBE PROPHYLAXIS * * *     Amox 500mg, take 4 tabs one hour prior to procedure.Takes this because of lymphedema secondary from leg surgey     Antiplatelet or antithrombotic long-term use      Arrhythmia      Central serous retinopathy     Resolved 2001     CHRONIC NECK PAIN      Depressive disorder      Depressive disorder, not elsewhere classified      Elevated coronary artery calcium score=2021     Elevated coronary artery calcium score=2021     History of blood transfusion 2019    during left hip pinning, during surgery for patella     Lateral epicondylitis      MIXED HYPERLIPIDEMIA, LDL GOAL <160     LDL goal < 160     Motion sickness      MYXOID LIPOSARCOMA     Left thigh, S/P excision,  radiation  at [facility]     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    CHRONIC LEFT THIGH LYMPHEDEMA     Nontoxic multinodular goiter 2005    needs yearly US     Osteoporosis, unspecified 2001     Other chronic pain     fx ribs left      Other lymphedema 2000    left thigh, gets regular PT for this     Overactive bladder      PAD (peripheral artery disease) (H) 04/20/2018     PONV (postoperative nausea and vomiting)      SHINGLES 2001     Sprain of lumbosacral (joint) (ligament) 1995    right     Unspecified hearing loss 1998    chronic tinnitus     Unspecified tinnitus 1998     Past Surgical History:   Procedure Laterality Date     ARTHROPLASTY HIP Left 10/4/2019    Procedure: Removal of left femoral hardware with a conversion to left total hip arthroplasty using a Biomet Annalisa femoral stem with an OsseoTi acetabular shell and a dual mobility bearing surface;  Surgeon: Spencer Celeste MD;  Location:  OR     BIOPSY  April 2000     BIOPSY BREAST SEED LOCALIZATION Right 12/14/2022    Procedure: right breast tag localized lumpectomy;  Surgeon: Shelly Carr MD;  Location:  OR     BIOPSY NODE SENTINEL Right 12/14/2022    Procedure: with right sentinel lymph node biopsy;  Surgeon: Shelly Carr MD;  Location:  OR     BYPASS GRAFT FEMOROPOPLITEAL Left 1/21/2019    Procedure: LEFT FEMORAL TO ABOVE KNEE POPLITEAL  BYPASS WITH POLYTETRAFLUOROETHYLENE GRAFT;  Surgeon: Shade Owens MD;  Location:  OR     COLONOSCOPY N/A 2/5/2016    Procedure: COMBINED COLONOSCOPY, SINGLE OR MULTIPLE BIOPSY/POLYPECTOMY BY BIOPSY;  Surgeon: Varun Stanley MD, MD;  Location:  GI     COLONOSCOPY N/A 12/10/2021    Procedure: COLONOSCOPY, WITH POLYPECTOMIES  USING COLD  SNARE,   CLIP APPLIED X2;  Surgeon: Dayton Luna MD;  Location:  GI     CYSTOSCOPY       EXCISION MALIG LESION>1.25CM  5/2000    Myxoid Liposarcoma       EXPLORE GROIN Right 5/1/2018    Procedure: EXPLORE GROIN;  EMERGENCY RIGHT FEMORAL  EXPLORATION WITH FEMORAL ARTERY REPAIR.    EBL: 50mL;  Surgeon: Shade Owens MD;  Location: [facility] OR     HC COLP CERVIX/UPPER VAGINA  07/1997    Negative     HC DILATION/CURETTAGE DIAG/THER NON OB  02/1997    Post menopausal bleeding on HRT, negative     HIP SURGERY Left 04/13/2019     IR ANGIOGRAM THROUGH CATHETER FOLLOW UP  12/20/2018     IR ANGIOGRAM THROUGH CATHETER FOLLOW UP  12/21/2018     IR LOWER EXTREMITY ANGIOGRAM LEFT  12/19/2018     OPEN REDUCTION INTERNAL FIXATION PATELLA Left 12/21/2020    Procedure: Left partial patella fracture excision with advancement of the quadriceps tendon;  Surgeon: Stephen Rhodes MD;  Location: [facility] OR     OPEN REDUCTION INTERNAL FIXATION RODDING INTRAMEDULLARY TIBIA Left 12/21/2020    Procedure: Open reduction intramedullary nailing of left tibia fracture;  Surgeon: Stephen Rhodes MD;  Location: [facility] OR     REMOVE HARDWARE KNEE Left 5/31/2022    Procedure: Left knee patella hardware removal;  Surgeon: Spencer Celeste MD;  Location: [facility] OR     REPAIR TENDON QUADRICEPS Left 7/28/2021    Procedure: Left knee quadricepsplasty with intraoperative patellar fracture requiring open reduction and internal fixation of the patella;  Surgeon: Spencer Celeste MD;  Location: [facility] OR     REPAIR TENDON QUADRICEPS Left 5/31/2022    Procedure: Open left knee VY quadricepsplasty with hardware removal and allograft;  Surgeon: Spencer Celeste MD;  Location:  OR     VASCULAR SURGERY  04/30/2018    Left SFA stent in bypass graft     ZZC APPENDECTOMY      Appendectomy     ZZC NONSPECIFIC PROCEDURE  04/2000    Open Biopsy Left Thigh Liposarcoma     ZZHC COLONOSCOPY THRU STOMA, DIAGNOSTIC  2006    due 2010            Social History:     Social History     Tobacco Use     Smoking status: Never     Passive exposure: Never     Smokeless tobacco: Never   Vaping Use     Vaping status: Never Used   Substance Use Topics     Alcohol use: Never       Marital Status:   accompanied by her  for this consultation  Living situation: Independent  Family support: Supportive family  Vocational History: Works at home  Tobacco use: Not applicable  Alcohol use: Not applicable  Recreational drug use: Not applicable         Functional history:     Angeli Jacobson is independent with all aspects of her life.  Uses a cane for ambulation.    ADLs: Independent  Assistive devices: Cane in the right hand  iADLs (medication management and finances): Not applicable  Hand dominance: Right handed           Family History:     Family History   Problem Relation Age of Onset     C.A.D. Father         MI 57     Alcohol/Drug Father         etoh     Coronary Artery Disease Father      Obesity Mother      Osteoporosis Mother      Colon Cancer Brother      Hyperlipidemia Son      Anxiety Disorder Son      Hyperlipidemia Son         very high, experimental drug     C.A.D. Paternal Grandmother         ascvd     Diabetes Maternal Grandmother      Cancer Maternal Grandmother      C.A.D. Paternal Uncle         Mi  age 48     Cancer Maternal Aunt         pancreatic CA     Hyperlipidemia Son      Hyperlipidemia Cousin             Medications:     Current Outpatient Medications   Medication Sig Dispense Refill     anastrozole (ARIMIDEX) 1 MG tablet Take 1 tablet (1 mg) by mouth daily 90 tablet 3     calcium carbonate 600 mg-vitamin D 400 units (CALTRATE) 600-400 MG-UNIT per tablet Take 1 chew tab by mouth daily       clopidogrel (PLAVIX) 75 MG tablet Take 1 tablet (75 mg) by mouth daily 90 tablet 3     evolocumab (REPATHA) 140 MG/ML prefilled autoinjector Inject 1 mL (140 mg) Subcutaneous every 14 days 6 mL 3     ezetimibe (ZETIA) 10 MG tablet Take 1 tablet (10 mg) by mouth daily 90 tablet 3     levothyroxine (SYNTHROID/LEVOTHROID) 75 MCG tablet Take 1 tablet (75 mcg) by mouth daily 90 tablet 3     multivitamin, therapeutic (THERA-VIT) TABS tablet Take 1 tablet by mouth daily       nitroFURantoin  "macrocrystal-monohydrate (MACROBID) 100 MG capsule Take 1 capsule (100 mg) by mouth 2 times daily for 7 days 14 capsule 0     nitroFURantoin macrocrystal-monohydrate (MACROBID) 100 MG capsule TAKE 1 CAPSULE BY MOUTH AFTER INTRCOURSE 30 capsule 3     rivaroxaban ANTICOAGULANT (XARELTO) 2.5 MG TABS tablet Take 1 tablet (2.5 mg) by mouth 2 times daily 60 tablet 5     rosuvastatin (CRESTOR) 20 MG tablet Take 1 tablet (20 mg) by mouth daily 90 tablet 3     solifenacin (VESICARE) 5 MG tablet Take 1 tablet (5 mg) by mouth daily 90 tablet 3            Allergies:     Allergies   Allergen Reactions     Celexa [Citalopram Hydrobromide]      Decreased libido              ROS:     A focused ROS is negative other than the symptoms noted above in the HPI.      Constitutional:  Gen: NAD, pleasant and cooperative    Respiratory: denies dyspnea   Musculoskeletal: denies any muscle pain, joint pain, neck pain or back pain but during the evaluation she declared that she is unable to lift her left thigh beyond 85 degrees and that causes discomfort.  She mentioned it is after the surgery and extensive procedures that she had gone through that she does not have this function available to her.  Neurologic: denies any headache, changes in motor or sensory function, no loss of balance or vertigo   Psychiatric: denies mood changes; sleeps OK   Posture: Spine she has thoracolumbar care in the setting of discrepancy of lower extremities  Lower extremities; left lower extremity shorter than right.  She has been provided after her orthopedic evaluation with a left to be applied to the left shoe.               Physical Examiniation:     VITAL SIGNS: BP (!) 144/83   Pulse 66   Ht 1.664 m (5' 5.5\")   Wt 71.2 kg (157 lb)   LMP 03/18/2005   SpO2 96%   BMI 25.73 kg/m    BMI: Estimated body mass index is 25.73 kg/m  as calculated from the following:    Height as of this encounter: 1.664 m (5' 5.5\").    Weight as of this encounter: 71.2 kg (157 " lb).    Gen: NAD, pleasant and cooperative     Pulm: non-labored breathing in room air    Ext: Has chronic edema, longstanding in the left lower extremity extending to the thigh area for which she has been treated extensively through excellent physical therapy measures wears a  compressive thigh high, which extends to above the knee on the left edema .  Does not have any edema or calf tenderness in the right lower extremity      Gen: In no acute distress, pleasant and cooperative          Neuro/Musculoskeletal; she has full range of motion of the upper extremities and is pain-free.    Deep Tendon Reflexes: Symmetrical in the upper extremities, left lower extremity reflexes are present.  Right lower extremity unable to to obtain deep tendon reflexes due to significant edema    Gross evaluation Muscle strength: Muscle strength in the upper extremities within normal range.  Right lower extremity also able to tolerate manual resistance.  Left lower extremity limited range of motion in the hip area with discomfort on flexion beyond 85 degree    Upper extremities: Range of motion within normal range      Lower extremities: Right lower extremity range of motion , within normal range.  Left lower extremity with significant edema and discomfort in the left hip with range of motion beyond 85 degree    Paresthesias: Upper extremities, no paresthesias reported    Lower extremities no paresthesias reported except for changes related to the lymphedema of the left lower extremity    Range of Motion:  As described above      Upper extremities: Within normal      Lower extremities: As described above    Gait :  Steadiness : Unsteadiness of gait was noted in the setting of discrepancy of lower extremities with the left lower extremity not having sufficient compensation for the discrepancy at this time    unable to walk on toes and heels at the present without proper shoe lift and correction    Tandem test unsteadiness was noted        Spine Alignment : Posture: Mild thoracolumbar curve  Thoracic: Mild kyphosis  Thoracolumbar: As above  Status of  lower ext's : Left lower extremity shorter than right about half an inch plus or about 2 cm       SLR :  Right negative on the right           t left with the stiffness and discomfort    AMAIRANI: Right negative                Left unable to test due to discomfort         Laboratory/Imaging:   Bone Mineral Density: Of the spine -2.5    Lesser bone loss from the hip        X-RAYS: No recent x-rays of spine was found on review of the history of images           Assessment/Plan:     Antalgic gait in the setting of discrepancy of lower extremities with the left shorter than right.  Significant edema of the left lower extremity has been addressed and management implemented through physical therapy.  Thoracolumbar care needs radiographs to be obtained.  As a part of screening for osteoporosis we need entire spine x-rays.  Discomfort in the left hip area with denial of pain.  Diagnoses :    A.  Osteoporosis  B.  Hypercalcemia; 10.5  C.  Spine x-rays to be obtained to rule out evidence for compression fracture or wedging of the vertebral bodies  D.  Antalgic gait to be addressed with proper lifting applied to the left shoe  E.  If any pain in the pelvic area to address that in future since she has history of sacral insufficiency fracture and at the present denies any pain      ----------------------------------------------------            1. Patient education: In depth discussion and education was provided about the assessment and implications of each of the below recommendations for management. Patient indicated readiness to learn, all questions were answered and understanding of material presented was confirmed.  Provided pictures of exercises that patient needs to implement at home for improvement of the status of her musculoskeletal pain in the left lower extremity.  She also needs to improve her muscular  support of the spine especially her core muscles.  These were discussed and instructions were provided    2. Work-up: Hypercalcemia to be addressed through evaluation of serum parathyroid hormone level.  And if necessary in future to obtain ultrasound of the cervical spine for further looking if there are any aberrant parathyroid glands.  I reviewed her calcium intake it was not unusual.  I suggest that she would bring her calcium bottles to us next time     3. Therapy/equipment/braces: At this time she only uses a cane and has been getting along well without using any other devices but the shoe correction was addressed and the left size was provided    4. Medications: As reflected in the history    5. Interventions: Spine x-rays need to be obtained and  serum PTH level needs to be evaluated    6. Referral / follow up with other providers: As provided by the primary service    7. Follow up: In 2 weeks when the radiographic evaluation result and serum PTH result become available    Elías Bridges MD    Physical Medicine & Rehabilitation    I spent a total of 60 minutes 20 minutes minutes face-to-face with Angeli Jacobson during today's office visit. Over 50% of this time was spent counseling the patient and/or coordinating care. See note for details.     20 minutes minutes spent on the date of the encounter doing chart review, history and exam, documentation and further activities as noted above

## 2023-05-03 NOTE — LETTER
5/3/2023       RE: Angeli Jacobson  [id number]  [facility] [address]       Dear Colleague,    Thank you for referring your patient, Angeli Jacobson, to the [facility] at [facility]. Please see a copy of my visit note below.           PM&R Clinic Note     Patient Name: Angeli Jacobson : 1950 Medical Record: [id number]     Requesting Physician/clinician: No att. providers found           History of Present Illness:     Ms. Rossy Jacobson is a very pleasant lady accompanied by her  for this consultation.  She has past history of localized breast cancer which has been removed and there is no history of metastatic breast cancer lesions.  On review of her  MRI of pelvis the  diagnosis of insufficiency fractures of the left sacrum  And inferior and superior pubic rami had been brought up on 2022.  She also has a history of left lower extremity chronic lymphedema and is a status post fracture of the left patella.  She does not complain of pain in her pelvic area.  She walks with a limp and uses a cane in her right hand    Symptoms, antalgic gait favoring left lower extremity with denial of pain      Therapies/HEP/equipments, uses a cane in the right hand and has been advised to use a lift in the left shoe    Functionally, she is independent otherwise               Past Medical and Surgical History:     Past Medical History:   Diagnosis Date    * * * SBE PROPHYLAXIS * * *     Amox 500mg, take 4 tabs one hour prior to procedure.Takes this because of lymphedema secondary from leg surgey    Antiplatelet or antithrombotic long-term use     Arrhythmia     Central serous retinopathy     Resolved 2001    CHRONIC NECK PAIN     Depressive disorder     Depressive disorder, not elsewhere classified     Elevated coronary artery calcium score=2021    Elevated coronary artery calcium  score=7/1/21 08/03/2021    History of blood transfusion 04/2019 12/2020    during left hip pinning, during surgery for patella    Lateral epicondylitis     MIXED HYPERLIPIDEMIA, LDL GOAL <160 1998    LDL goal < 160    Motion sickness     MYXOID LIPOSARCOMA 2000    Left thigh, S/P excision, radiation  at [facility]    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    CHRONIC LEFT THIGH LYMPHEDEMA    Nontoxic multinodular goiter 2005    needs yearly US    Osteoporosis, unspecified 2001    Other chronic pain     fx ribs left     Other lymphedema 2000    left thigh, gets regular PT for this    Overactive bladder     PAD (peripheral artery disease) (H) 04/20/2018    PONV (postoperative nausea and vomiting)     SHINGLES 2001    Sprain of lumbosacral (joint) (ligament) 1995    right    Unspecified hearing loss 1998    chronic tinnitus    Unspecified tinnitus 1998     Past Surgical History:   Procedure Laterality Date    ARTHROPLASTY HIP Left 10/4/2019    Procedure: Removal of left femoral hardware with a conversion to left total hip arthroplasty using a Biomet Annalisa femoral stem with an OsseoTi acetabular shell and a dual mobility bearing surface;  Surgeon: Spencer Celeste MD;  Location:  OR    BIOPSY  April 2000    BIOPSY BREAST SEED LOCALIZATION Right 12/14/2022    Procedure: right breast tag localized lumpectomy;  Surgeon: Shelly Carr MD;  Location:  OR    BIOPSY NODE SENTINEL Right 12/14/2022    Procedure: with right sentinel lymph node biopsy;  Surgeon: Shelly Carr MD;  Location:  OR    BYPASS GRAFT FEMOROPOPLITEAL Left 1/21/2019    Procedure: LEFT FEMORAL TO ABOVE KNEE POPLITEAL  BYPASS WITH POLYTETRAFLUOROETHYLENE GRAFT;  Surgeon: Shade Owens MD;  Location:  OR    COLONOSCOPY N/A 2/5/2016    Procedure: COMBINED COLONOSCOPY, SINGLE OR MULTIPLE BIOPSY/POLYPECTOMY BY BIOPSY;  Surgeon: Varun Stanley MD, MD;  Location:  GI    COLONOSCOPY N/A 12/10/2021    Procedure: COLONOSCOPY, WITH  POLYPECTOMIES  USING COLD  SNARE,   CLIP APPLIED X2;  Surgeon: Dayton Luna MD;  Location:  GI    CYSTOSCOPY      EXCISION MALIG LESION>1.25CM  5/2000    Myxoid Liposarcoma      EXPLORE GROIN Right 5/1/2018    Procedure: EXPLORE GROIN;  EMERGENCY RIGHT FEMORAL EXPLORATION WITH FEMORAL ARTERY REPAIR.    EBL: 50mL;  Surgeon: Shade Owens MD;  Location:  OR    HC COLP CERVIX/UPPER VAGINA  07/1997    Negative    HC DILATION/CURETTAGE DIAG/THER NON OB  02/1997    Post menopausal bleeding on HRT, negative    HIP SURGERY Left 04/13/2019    IR ANGIOGRAM THROUGH CATHETER FOLLOW UP  12/20/2018    IR ANGIOGRAM THROUGH CATHETER FOLLOW UP  12/21/2018    IR LOWER EXTREMITY ANGIOGRAM LEFT  12/19/2018    OPEN REDUCTION INTERNAL FIXATION PATELLA Left 12/21/2020    Procedure: Left partial patella fracture excision with advancement of the quadriceps tendon;  Surgeon: Stephen Rhodes MD;  Location:  OR    OPEN REDUCTION INTERNAL FIXATION RODDING INTRAMEDULLARY TIBIA Left 12/21/2020    Procedure: Open reduction intramedullary nailing of left tibia fracture;  Surgeon: Stephen Rhodes MD;  Location:  OR    REMOVE HARDWARE KNEE Left 5/31/2022    Procedure: Left knee patella hardware removal;  Surgeon: Spencer Celeste MD;  Location:  OR    REPAIR TENDON QUADRICEPS Left 7/28/2021    Procedure: Left knee quadricepsplasty with intraoperative patellar fracture requiring open reduction and internal fixation of the patella;  Surgeon: Spencer Celeste MD;  Location: [facility] OR    REPAIR TENDON QUADRICEPS Left 5/31/2022    Procedure: Open left knee VY quadricepsplasty with hardware removal and allograft;  Surgeon: Spencer Celeste MD;  Location:  OR    VASCULAR SURGERY  04/30/2018    Left SFA stent in bypass graft    ZZC APPENDECTOMY      Appendectomy    ZZC NONSPECIFIC PROCEDURE  04/2000    Open Biopsy Left Thigh Liposarcoma    ZZHC COLONOSCOPY THRU STOMA, DIAGNOSTIC  2006    due 2010             Social History:     Social History     Tobacco Use    Smoking status: Never     Passive exposure: Never    Smokeless tobacco: Never   Vaping Use    Vaping status: Never Used   Substance Use Topics    Alcohol use: Never       Marital Status:  accompanied by her  for this consultation  Living situation: Independent  Family support: Supportive family  Vocational History: Works at home  Tobacco use: Not applicable  Alcohol use: Not applicable  Recreational drug use: Not applicable         Functional history:     Angeli Jacobson is independent with all aspects of her life.  Uses a cane for ambulation.    ADLs: Independent  Assistive devices: Cane in the right hand  iADLs (medication management and finances): Not applicable  Hand dominance: Right handed           Family History:     Family History   Problem Relation Age of Onset    C.A.D. Father         MI 57    Alcohol/Drug Father         etoh    Coronary Artery Disease Father     Obesity Mother     Osteoporosis Mother     Colon Cancer Brother     Hyperlipidemia Son     Anxiety Disorder Son     Hyperlipidemia Son         very high, experimental drug    C.A.D. Paternal Grandmother         ascvd    Diabetes Maternal Grandmother     Cancer Maternal Grandmother     C.A.D. Paternal Uncle         Mi  age 48    Cancer Maternal Aunt         pancreatic CA    Hyperlipidemia Son     Hyperlipidemia Cousin             Medications:     Current Outpatient Medications   Medication Sig Dispense Refill    anastrozole (ARIMIDEX) 1 MG tablet Take 1 tablet (1 mg) by mouth daily 90 tablet 3    calcium carbonate 600 mg-vitamin D 400 units (CALTRATE) 600-400 MG-UNIT per tablet Take 1 chew tab by mouth daily      clopidogrel (PLAVIX) 75 MG tablet Take 1 tablet (75 mg) by mouth daily 90 tablet 3    evolocumab (REPATHA) 140 MG/ML prefilled autoinjector Inject 1 mL (140 mg) Subcutaneous every 14 days 6 mL 3    ezetimibe (ZETIA) 10 MG tablet Take 1 tablet (10 mg) by mouth  "daily 90 tablet 3    levothyroxine (SYNTHROID/LEVOTHROID) 75 MCG tablet Take 1 tablet (75 mcg) by mouth daily 90 tablet 3    multivitamin, therapeutic (THERA-VIT) TABS tablet Take 1 tablet by mouth daily      nitroFURantoin macrocrystal-monohydrate (MACROBID) 100 MG capsule Take 1 capsule (100 mg) by mouth 2 times daily for 7 days 14 capsule 0    nitroFURantoin macrocrystal-monohydrate (MACROBID) 100 MG capsule TAKE 1 CAPSULE BY MOUTH AFTER INTRCOURSE 30 capsule 3    rivaroxaban ANTICOAGULANT (XARELTO) 2.5 MG TABS tablet Take 1 tablet (2.5 mg) by mouth 2 times daily 60 tablet 5    rosuvastatin (CRESTOR) 20 MG tablet Take 1 tablet (20 mg) by mouth daily 90 tablet 3    solifenacin (VESICARE) 5 MG tablet Take 1 tablet (5 mg) by mouth daily 90 tablet 3            Allergies:     Allergies   Allergen Reactions    Celexa [Citalopram Hydrobromide]      Decreased libido              ROS:     A focused ROS is negative other than the symptoms noted above in the HPI.      Constitutional:  Gen: NAD, pleasant and cooperative    Respiratory: denies dyspnea   Musculoskeletal: denies any muscle pain, joint pain, neck pain or back pain but during the evaluation she declared that she is unable to lift her left thigh beyond 85 degrees and that causes discomfort.  She mentioned it is after the surgery and extensive procedures that she had gone through that she does not have this function available to her.  Neurologic: denies any headache, changes in motor or sensory function, no loss of balance or vertigo   Psychiatric: denies mood changes; sleeps OK   Posture: Spine she has thoracolumbar care in the setting of discrepancy of lower extremities  Lower extremities; left lower extremity shorter than right.  She has been provided after her orthopedic evaluation with a left to be applied to the left shoe.               Physical Examiniation:     VITAL SIGNS: BP (!) 144/83   Pulse 66   Ht 1.664 m (5' 5.5\")   Wt 71.2 kg (157 lb)   LMP " "03/18/2005   SpO2 96%   BMI 25.73 kg/m    BMI: Estimated body mass index is 25.73 kg/m  as calculated from the following:    Height as of this encounter: 1.664 m (5' 5.5\").    Weight as of this encounter: 71.2 kg (157 lb).    Gen: NAD, pleasant and cooperative     Pulm: non-labored breathing in room air    Ext: Has chronic edema, longstanding in the left lower extremity extending to the thigh area for which she has been treated extensively through excellent physical therapy measures wears a  compressive thigh high, which extends to above the knee on the left edema .  Does not have any edema or calf tenderness in the right lower extremity      Gen: In no acute distress, pleasant and cooperative          Neuro/Musculoskeletal; she has full range of motion of the upper extremities and is pain-free.    Deep Tendon Reflexes: Symmetrical in the upper extremities, left lower extremity reflexes are present.  Right lower extremity unable to to obtain deep tendon reflexes due to significant edema    Gross evaluation Muscle strength: Muscle strength in the upper extremities within normal range.  Right lower extremity also able to tolerate manual resistance.  Left lower extremity limited range of motion in the hip area with discomfort on flexion beyond 85 degree    Upper extremities: Range of motion within normal range      Lower extremities: Right lower extremity range of motion , within normal range.  Left lower extremity with significant edema and discomfort in the left hip with range of motion beyond 85 degree    Paresthesias: Upper extremities, no paresthesias reported    Lower extremities no paresthesias reported except for changes related to the lymphedema of the left lower extremity    Range of Motion:  As described above      Upper extremities: Within normal      Lower extremities: As described above    Gait :  Steadiness : Unsteadiness of gait was noted in the setting of discrepancy of lower extremities with the " left lower extremity not having sufficient compensation for the discrepancy at this time    unable to walk on toes and heels at the present without proper shoe lift and correction    Tandem test unsteadiness was noted       Spine Alignment : Posture: Mild thoracolumbar curve  Thoracic: Mild kyphosis  Thoracolumbar: As above  Status of  lower ext's : Left lower extremity shorter than right about half an inch plus or about 2 cm       SLR :  Right negative on the right           t left with the stiffness and discomfort    AMAIRANI: Right negative                Left unable to test due to discomfort         Laboratory/Imaging:   Bone Mineral Density: Of the spine -2.5    Lesser bone loss from the hip        X-RAYS: No recent x-rays of spine was found on review of the history of images           Assessment/Plan:     Antalgic gait in the setting of discrepancy of lower extremities with the left shorter than right.  Significant edema of the left lower extremity has been addressed and management implemented through physical therapy.  Thoracolumbar care needs radiographs to be obtained.  As a part of screening for osteoporosis we need entire spine x-rays.  Discomfort in the left hip area with denial of pain.  Diagnoses :    A.  Osteoporosis  B.  Hypercalcemia; 10.5  C.  Spine x-rays to be obtained to rule out evidence for compression fracture or wedging of the vertebral bodies  D.  Antalgic gait to be addressed with proper lifting applied to the left shoe  E.  If any pain in the pelvic area to address that in future since she has history of sacral insufficiency fracture and at the present denies any pain      ----------------------------------------------------            Patient education: In depth discussion and education was provided about the assessment and implications of each of the below recommendations for management. Patient indicated readiness to learn, all questions were answered and understanding of material  "presented was confirmed.  Provided pictures of exercises that patient needs to implement at home for improvement of the status of her musculoskeletal pain in the left lower extremity.  She also needs to improve her muscular support of the spine especially her core muscles.  These were discussed and instructions were provided    Work-up: Hypercalcemia to be addressed through evaluation of serum parathyroid hormone level.  And if necessary in future to obtain ultrasound of the cervical spine for further looking if there are any aberrant parathyroid glands.  I reviewed her calcium intake it was not unusual.  I suggest that she would bring her calcium bottles to us next time     Therapy/equipment/braces: At this time she only uses a cane and has been getting along well without using any other devices but the shoe correction was addressed and the left size was provided    Medications: As reflected in the history    Interventions: Spine x-rays need to be obtained and  serum PTH level needs to be evaluated    Referral / follow up with other providers: As provided by the primary service    Follow up: In 2 weeks when the radiographic evaluation result and serum PTH result become available    Elías Bridges MD    Physical Medicine & Rehabilitation    I spent a total of 60 minutes 20 minutes minutes face-to-face with Angeli Jacobson during today's office visit. Over 50% of this time was spent counseling the patient and/or coordinating care. See note for details.     20 minutes minutes spent on the date of the encounter doing chart review, history and exam, documentation and further activities as noted above      Angeli PARTH Jacobson is a 73 year old female who presents for:  Chief Complaint   Patient presents with    Consult     Physical changes associated with Left knee and Left Leg. Wants to know how much fluid is in knee        Initial Vitals:  BP (!) 144/83   Pulse 66   Ht 5' 5.5\" (1.664 m)   Wt 157 lb (71.2 kg) " "  LMP 03/18/2005   SpO2 96%   BMI 25.73 kg/m   Estimated body mass index is 25.73 kg/m  as calculated from the following:    Height as of this encounter: 5' 5.5\" (1.664 m).    Weight as of this encounter: 157 lb (71.2 kg).. Body surface area is 1.81 meters squared. BP completed using cuff size: regular  No Pain (0)    Nursing Comments: Patient was with their  and in good spirits.     Sheyla Bedoya MA        Again, thank you for allowing me to participate in the care of your patient.      Sincerely,    LASHAE ALMAZAN MD      "

## 2023-05-08 ENCOUNTER — MYC MEDICAL ADVICE (OUTPATIENT)
Dept: FAMILY MEDICINE | Facility: CLINIC | Age: 73
End: 2023-05-08
Payer: COMMERCIAL

## 2023-05-09 ENCOUNTER — OFFICE VISIT (OUTPATIENT)
Dept: FAMILY MEDICINE | Facility: CLINIC | Age: 73
End: 2023-05-09
Payer: COMMERCIAL

## 2023-05-09 ENCOUNTER — TELEPHONE (OUTPATIENT)
Dept: PHYSICAL MEDICINE AND REHAB | Facility: CLINIC | Age: 73
End: 2023-05-09

## 2023-05-09 VITALS
RESPIRATION RATE: 14 BRPM | BODY MASS INDEX: 25.58 KG/M2 | OXYGEN SATURATION: 94 % | SYSTOLIC BLOOD PRESSURE: 110 MMHG | HEIGHT: 66 IN | HEART RATE: 80 BPM | WEIGHT: 159.2 LBS | DIASTOLIC BLOOD PRESSURE: 64 MMHG | TEMPERATURE: 97.9 F

## 2023-05-09 DIAGNOSIS — R39.15 URGENCY OF URINATION: Primary | ICD-10-CM

## 2023-05-09 LAB
BACTERIA #/AREA URNS HPF: ABNORMAL /HPF
CLUE CELLS: NORMAL
RBC #/AREA URNS AUTO: ABNORMAL /HPF
TRICHOMONAS, WET PREP: NORMAL
WBC #/AREA URNS AUTO: ABNORMAL /HPF
WBC'S/HIGH POWER FIELD, WET PREP: NORMAL
YEAST, WET PREP: NORMAL

## 2023-05-09 PROCEDURE — 99213 OFFICE O/P EST LOW 20 MIN: CPT | Performed by: PHYSICIAN ASSISTANT

## 2023-05-09 PROCEDURE — 81015 MICROSCOPIC EXAM OF URINE: CPT | Performed by: PHYSICIAN ASSISTANT

## 2023-05-09 PROCEDURE — 87210 SMEAR WET MOUNT SALINE/INK: CPT | Performed by: PHYSICIAN ASSISTANT

## 2023-05-09 PROCEDURE — 87086 URINE CULTURE/COLONY COUNT: CPT | Performed by: PHYSICIAN ASSISTANT

## 2023-05-09 RX ORDER — HYDROCORTISONE 2.5 %
CREAM (GRAM) TOPICAL
COMMUNITY
Start: 2023-04-29 | End: 2023-07-18

## 2023-05-09 ASSESSMENT — PATIENT HEALTH QUESTIONNAIRE - PHQ9
SUM OF ALL RESPONSES TO PHQ QUESTIONS 1-9: 3
10. IF YOU CHECKED OFF ANY PROBLEMS, HOW DIFFICULT HAVE THESE PROBLEMS MADE IT FOR YOU TO DO YOUR WORK, TAKE CARE OF THINGS AT HOME, OR GET ALONG WITH OTHER PEOPLE: NOT DIFFICULT AT ALL
SUM OF ALL RESPONSES TO PHQ QUESTIONS 1-9: 3

## 2023-05-09 NOTE — PATIENT INSTRUCTIONS
Bladder irritants also can cause symptoms similar to a bladder infection. Bladder irritants include: caffeine (tea and coffee), alcohol, apple juice, lemon juice, soy sauce, carbonated drinks, pineapple, strawberries, tomatoes, yogurt and vinegar.    Prevention of urinary tract infection:    AVOID CHEMICAL IRRITTANTS: bath gels,  Perfumed products,  Deoderant pads or tampons, douching    CLOTHING that increases moisture and bacterial growth:  Nylon, lycra, pantihose, pantiliners     AVOID: tight clothing and thongs    ACIDIFY URINE: cranberry tablets instead of cranberry juice (with excess sugar) to acidify urine and decrease bacterial growth.     URINATE after intercourse    FLUIDS: 6-8 glasses water per day

## 2023-05-09 NOTE — TELEPHONE ENCOUNTER
Pt sent [employer] message to Osiris Davey APRN CNP yesterday (see provider's response below)    Osiris Davey APRN CNP  to Angeli Jacobson    AM      5/9/23  8:24 AM  Hi there     I've ordered another urine test and a vaginal self swab to look for infections. I would recommend scheduling that later this week once you've been of the antibiotic for several days. I'd also reach out to schedule a virtual visit with your primary doctor to discuss. Sound ok?     Otoniel,     NIDA Jane-CHELITA.    Last read by Angeli Jacobson at  8:57 AM on 5/9/2023.      Spottedt message sent to pt reiterating provider instructions.    Loan Schaefer RN

## 2023-05-09 NOTE — TELEPHONE ENCOUNTER
[facility] Call Center    Phone Message    May a detailed message be left on voicemail: yes     Reason for Call: Requesting Results   Name/type of test: XR SPINE COMPLETE 2 VIEWS and labs  Date of test: 5/3/23  Was test done at a location other than [facility] (Please fill in the location if not [facility])?: No    Angeli calling to request a call back to discuss her next steps from her tests.    Action Taken: Message routed to:  Other: [facility] PHYS MED [facility]    Travel Screening: Not Applicable

## 2023-05-09 NOTE — PROGRESS NOTES
"  Assessment & Plan     Urgency of urination  Unclear cause. Urine sample was very little. Small amount of white blood cells there. When I spoke with lab they reported they would send for urine culture. Urine culture ordered. For now I recommended not treating as her urine previously was negative for infection. Wet prep negative.   I reviewed bladder irritants with patient to see if this was triggering her symptoms.  I sent a message to her urology team Nancy Chawla PA-C to get further recommendations or guidance.  - UA Macroscopic with reflex to Microscopic and Culture  - Wet prep - lab collect  - Urine Microscopic Exam    Review of external notes as documented elsewhere in note  Review of the result(s) of each unique test - wet prep and microscopic urine  Ordering of each unique test  26 minutes spent by me on the date of the encounter doing chart review, history and exam, documentation and further activities per the note       Muriel Orosco PA-C  [facility] DAYAMI Suh is a 73 year old, presenting for the following health issues:  UTI        5/9/2023     3:21 PM   Additional Questions   Roomed by Digna Montalvo   Accompanied by SHERRY     UTI         Genitourinary - Female  Onset/Duration: Two weeks   Description:   Painful urination (Dysuria): No           Frequency: No  Blood in urine (Hematuria): No  Delay in urine (Hesitency): No  She reports \"intense urges\" to urinate. She would empty typically a lot of urine.  Intensity: moderate, severe  Progression of Symptoms:  worsening  Accompanying Signs & Symptoms:  Fever/chills: No  Flank pain: No  Nausea and vomiting: No  Vaginal symptoms: none  Abdominal/Pelvic Pain: No  History:   History of frequent UTI s: No  History of kidney stones: No  Sexually Active: YES  Possibility of pregnancy: No  Precipitating or alleviating factors: None  Therapies tried and outcome:  Azo , Macrobid    Urine testing 5/2/23 was negative for " "infection but patient was treated with Macrobid (this seemed to improve symptoms) but then symptoms worsening again recently.    Started the Arimidex in April wondering if related to her recent symptoms.      Review of Systems   GENERAL:  No fevers         Objective    /64 (BP Location: Right arm, Patient Position: Sitting, Cuff Size: Adult Regular)   Pulse 80   Temp 97.9  F (36.6  C) (Oral)   Resp 14   Ht 1.664 m (5' 5.5\")   Wt 72.2 kg (159 lb 3.2 oz)   LMP 03/18/2005   SpO2 94%   BMI 26.09 kg/m    Body mass index is 26.09 kg/m .  Physical Exam   GENERAL: No acute distress  HEENT: Normocephalic  NEURO: Alert and non-focal      Results for orders placed or performed in visit on 05/09/23 (from the past 24 hour(s))   Wet prep - lab collect    Specimen: Vagina; Swab   Result Value Ref Range    Trichomonas Absent Absent    Yeast Absent Absent    Clue Cells Absent Absent    WBCs/high power field None None   Urine Microscopic Exam   Result Value Ref Range    Bacteria Urine None Seen None Seen /HPF    RBC Urine 2-5 (A) 0-2 /HPF /HPF    WBC Urine 0-5 0-5 /HPF /HPF    Narrative    Microscopic exam performed on unspun urine.Sample less than 1mL       *Note: Due to a large number of results and/or encounters for the requested time period, some results have not been displayed. A complete set of results can be found in Results Review.                 "

## 2023-05-10 ENCOUNTER — PATIENT OUTREACH (OUTPATIENT)
Dept: CARE COORDINATION | Facility: CLINIC | Age: 73
End: 2023-05-10
Payer: COMMERCIAL

## 2023-05-11 LAB — BACTERIA UR CULT: NORMAL

## 2023-05-12 ENCOUNTER — HOSPITAL ENCOUNTER (OUTPATIENT)
Dept: ULTRASOUND IMAGING | Facility: CLINIC | Age: 73
Discharge: HOME | End: 2023-05-12
Attending: SURGERY
Payer: COMMERCIAL

## 2023-05-12 ENCOUNTER — OFFICE VISIT (OUTPATIENT)
Dept: OTHER | Facility: CLINIC | Age: 73
End: 2023-05-12
Attending: SURGERY
Payer: COMMERCIAL

## 2023-05-12 VITALS — DIASTOLIC BLOOD PRESSURE: 75 MMHG | SYSTOLIC BLOOD PRESSURE: 130 MMHG | HEART RATE: 68 BPM | OXYGEN SATURATION: 99 %

## 2023-05-12 DIAGNOSIS — Z95.828 HISTORY OF ARTERIAL BYPASS OF LOWER EXTREMITY: ICD-10-CM

## 2023-05-12 DIAGNOSIS — Z95.828 HISTORY OF ARTERIAL BYPASS OF LOWER EXTREMITY: Primary | ICD-10-CM

## 2023-05-12 DIAGNOSIS — Z98.890 HISTORY OF FEMOROPOPLITEAL BYPASS: ICD-10-CM

## 2023-05-12 DIAGNOSIS — I89.0 LYMPHEDEMA OF LEFT LEG: ICD-10-CM

## 2023-05-12 DIAGNOSIS — I73.9 PAD (PERIPHERAL ARTERY DISEASE) (H): ICD-10-CM

## 2023-05-12 PROCEDURE — 99213 OFFICE O/P EST LOW 20 MIN: CPT | Performed by: SURGERY

## 2023-05-12 PROCEDURE — G0463 HOSPITAL OUTPT CLINIC VISIT: HCPCS | Mod: 25

## 2023-05-12 PROCEDURE — 93926 LOWER EXTREMITY STUDY: CPT | Mod: LT

## 2023-05-12 NOTE — PROGRESS NOTES
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  At that time she had arterial reconstruction using a PTFE interposition graft.  That graft occluded in 2019 and I performed a redo left femoral to above-knee popliteal bypass with 6 mm ringed PTFE graft.  For reasons that are unclear to all of us, she has been lost to my follow-up.  She still sees Dr. Prince in vascular medicine and a graft ultrasound performed last month was suggestive of a developing moderate stenosis in the proximal graft.  She is not a diabetic and does not smoke.  Presumably this stenotic area represents probable intramural thrombus versus less likely intimal hyperplasia.  Clinically she is unaware of any issues with the graft.  She admits that she has not ambulated much over the past 3 years as she is recovering from multiple orthopedic procedures on her left leg.  She utilizes a thigh-high compression stocking of 30-40 mmHg on a daily basis.    After a discussion with Dr. Prince she was empirically restarted on low-dose Xarelto and continued on Plavix.  She returns at this time for close follow-up including a repeat left leg graft ultrasound.  She is accompanied by her  and has no complaints.    Exam:  Well-developed female alert and oriented x3.  Blood pressure 130/75 with a pulse of 68.  Lymphedematous changes of the left leg.  Palpable pedal pulses bilaterally.    Imaging:   ARTERIAL DUPLEX ULTRASOUND LEFT LOWER EXTREMITY  5/12/2023 10:27 AM     HISTORY: 73-year-old woman with history of peripheral arterial disease  and left femoral to above-knee popliteal PTFE surgical arterial bypass  graft.     COMPARISON: March 28, 2022.     TECHNIQUE: Color Doppler and spectral waveform analysis obtained  throughout the left lower extremity surgical arterial bypass graft and  adjacent native arteries.     FINDINGS: Bypass graft is  widely patent. No focal velocity elevation  to suggest stenosis. Waveforms appear normal.                                                                      IMPRESSION: Widely patent left lower extremity surgical arterial  bypass graft. No suggestion of stenosis.     JOMAR MOLINA MD     ASSESSMENT:  1.  4 years status post left common femoral to above-knee popliteal bypass with 6 mm PTFE graft clinically doing well.  Repeat left leg graft ultrasound today shows no evidence of the developing proximal graft stenosis noted 6 weeks ago.    2.  Status post resection of left thigh sarcoma with subsequent radiation therapy in remote past and resultant moderate left leg lymphedema.    3.  History of right breast carcinoma status postlumpectomy and sentinel lymph node biopsy in 2022.    PLAN:  I reviewed the above with Angeli and her .  She will continue her medical regimen including low-dose Xarelto and Plavix.  I have no vascular surgical concerns.  Vascular surgical follow-up will be with me in 1 year for a repeat left leg graft ultrasound and an ADRIAN with exercise.  She will continue to utilize her left leg thigh-high compression stocking of 30-40 mmHg pressure.    Total length of this encounter was 20 minutes with time spent reviewing studies, interviewing and examining the patient, answering questions, and coordinating a treatment plan.    Elijah Owens MD

## 2023-05-12 NOTE — PROGRESS NOTES
Patient is here to discuss follow up    /75 (BP Location: Right arm, Patient Position: Chair, Cuff Size: Adult Regular)   Pulse 68   LMP 03/18/2005   SpO2 99%     Questions patient would like addressed today are: N/A.    Refills are needed: No    Has homecare services and agency name:  [facility]

## 2023-05-16 ENCOUNTER — HOSPITAL ENCOUNTER (OUTPATIENT)
Dept: MAMMOGRAPHY | Facility: CLINIC | Age: 73
Discharge: HOME | End: 2023-05-16
Attending: SURGERY | Admitting: SURGERY
Payer: COMMERCIAL

## 2023-05-16 DIAGNOSIS — Z17.0 MALIGNANT NEOPLASM OF CENTRAL PORTION OF RIGHT BREAST IN FEMALE, ESTROGEN RECEPTOR POSITIVE (H): ICD-10-CM

## 2023-05-16 DIAGNOSIS — C50.911 HORMONE RECEPTOR POSITIVE BREAST CANCER, RIGHT (H): ICD-10-CM

## 2023-05-16 DIAGNOSIS — C50.111 MALIGNANT NEOPLASM OF CENTRAL PORTION OF RIGHT BREAST IN FEMALE, ESTROGEN RECEPTOR POSITIVE (H): ICD-10-CM

## 2023-05-16 PROCEDURE — 77061 BREAST TOMOSYNTHESIS UNI: CPT

## 2023-05-18 ENCOUNTER — OFFICE VISIT (OUTPATIENT)
Dept: PHYSICAL MEDICINE AND REHAB | Facility: CLINIC | Age: 73
End: 2023-05-18
Payer: COMMERCIAL

## 2023-05-18 VITALS
HEART RATE: 78 BPM | OXYGEN SATURATION: 98 % | BODY MASS INDEX: 25.55 KG/M2 | SYSTOLIC BLOOD PRESSURE: 121 MMHG | DIASTOLIC BLOOD PRESSURE: 75 MMHG | WEIGHT: 159 LBS | HEIGHT: 66 IN

## 2023-05-18 DIAGNOSIS — E21.3 HYPERPARATHYROIDISM (H): Primary | ICD-10-CM

## 2023-05-18 DIAGNOSIS — M81.0 AGE-RELATED OSTEOPOROSIS WITHOUT CURRENT PATHOLOGICAL FRACTURE: ICD-10-CM

## 2023-05-18 DIAGNOSIS — M84.375G: ICD-10-CM

## 2023-05-18 PROCEDURE — 99214 OFFICE O/P EST MOD 30 MIN: CPT | Performed by: PHYSICAL MEDICINE & REHABILITATION

## 2023-05-18 NOTE — PROGRESS NOTES
PM&R Clinic Note     Patient Name: Angeli Jacobson : 1950 Medical Record: [id number]     Requesting Physician/clinician: No att. providers found           History of Present Illness:     Angeli Jacobson is a 73 year old female ***          Symptoms,        Therapies/HEP/equipments,        Functionally,                Past Medical and Surgical History:     Past Medical History:   Diagnosis Date     * * * SBE PROPHYLAXIS * * *     Amox 500mg, take 4 tabs one hour prior to procedure.Takes this because of lymphedema secondary from leg surgey     Antiplatelet or antithrombotic long-term use      Arrhythmia      Central serous retinopathy     Resolved 2001     CHRONIC NECK PAIN      Depressive disorder      Depressive disorder, not elsewhere classified      Elevated coronary artery calcium score=2021     Elevated coronary artery calcium score=2021     History of blood transfusion 2019    during left hip pinning, during surgery for patella     Lateral epicondylitis      MIXED HYPERLIPIDEMIA, LDL GOAL <160     LDL goal < 160     Motion sickness      MYXOID LIPOSARCOMA 2000    Left thigh, S/P excision, radiation  at [facility]     Myxoid liposarcoma (HCC) 2004    CHRONIC LEFT THIGH LYMPHEDEMA     Nontoxic multinodular goiter 2005    needs yearly      Osteoporosis, unspecified      Other chronic pain     fx ribs left      Other lymphedema 2000    left thigh, gets regular PT for this     Overactive bladder      PAD (peripheral artery disease) (H) 2018     PONV (postoperative nausea and vomiting)      SHINGLES      Sprain of lumbosacral (joint) (ligament)     right     Unspecified hearing loss 1998    chronic tinnitus     Unspecified tinnitus 1998     Past Surgical History:   Procedure Laterality Date     ARTHROPLASTY HIP Left 10/4/2019    Procedure: Removal of left femoral hardware with a conversion to left total hip  arthroplasty using a Biomet Annalisa femoral stem with an OsseoTi acetabular shell and a dual mobility bearing surface;  Surgeon: Spencer Celeste MD;  Location:  OR     BIOPSY  April 2000     BIOPSY BREAST SEED LOCALIZATION Right 12/14/2022    Procedure: right breast tag localized lumpectomy;  Surgeon: Shelly Carr MD;  Location:  OR     BIOPSY NODE SENTINEL Right 12/14/2022    Procedure: with right sentinel lymph node biopsy;  Surgeon: Shelly Carr MD;  Location:  OR     BYPASS GRAFT FEMOROPOPLITEAL Left 1/21/2019    Procedure: LEFT FEMORAL TO ABOVE KNEE POPLITEAL  BYPASS WITH POLYTETRAFLUOROETHYLENE GRAFT;  Surgeon: Shade Owens MD;  Location:  OR     COLONOSCOPY N/A 2/5/2016    Procedure: COMBINED COLONOSCOPY, SINGLE OR MULTIPLE BIOPSY/POLYPECTOMY BY BIOPSY;  Surgeon: Varun Stanley MD, MD;  Location:  GI     COLONOSCOPY N/A 12/10/2021    Procedure: COLONOSCOPY, WITH POLYPECTOMIES  USING COLD  SNARE,   CLIP APPLIED X2;  Surgeon: Dayton Luna MD;  Location:  GI     CYSTOSCOPY       EXCISION MALIG LESION>1.25CM  5/2000    Myxoid Liposarcoma       EXPLORE GROIN Right 5/1/2018    Procedure: EXPLORE GROIN;  EMERGENCY RIGHT FEMORAL EXPLORATION WITH FEMORAL ARTERY REPAIR.    EBL: 50mL;  Surgeon: Shade Owens MD;  Location:  OR     HC COLP CERVIX/UPPER VAGINA  07/1997    Negative     HC DILATION/CURETTAGE DIAG/THER NON OB  02/1997    Post menopausal bleeding on HRT, negative     HIP SURGERY Left 04/13/2019     IR ANGIOGRAM THROUGH CATHETER FOLLOW UP  12/20/2018     IR ANGIOGRAM THROUGH CATHETER FOLLOW UP  12/21/2018     IR LOWER EXTREMITY ANGIOGRAM LEFT  12/19/2018     OPEN REDUCTION INTERNAL FIXATION PATELLA Left 12/21/2020    Procedure: Left partial patella fracture excision with advancement of the quadriceps tendon;  Surgeon: Stephen Rhodes MD;  Location:  OR     OPEN REDUCTION INTERNAL FIXATION RODDING INTRAMEDULLARY TIBIA Left 12/21/2020    Procedure: Open  reduction intramedullary nailing of left tibia fracture;  Surgeon: Stephen Rhodes MD;  Location: [facility] OR     REMOVE HARDWARE KNEE Left 5/31/2022    Procedure: Left knee patella hardware removal;  Surgeon: Spencer Celeste MD;  Location: [facility] OR     REPAIR TENDON QUADRICEPS Left 7/28/2021    Procedure: Left knee quadricepsplasty with intraoperative patellar fracture requiring open reduction and internal fixation of the patella;  Surgeon: Spencer Celeste MD;  Location: [facility] OR     REPAIR TENDON QUADRICEPS Left 5/31/2022    Procedure: Open left knee VY quadricepsplasty with hardware removal and allograft;  Surgeon: Spencer Celeste MD;  Location: [facility] OR     VASCULAR SURGERY  04/30/2018    Left SFA stent in bypass graft     ZZC APPENDECTOMY      Appendectomy     ZZC NONSPECIFIC PROCEDURE  04/2000    Open Biopsy Left Thigh Liposarcoma     ZZHC COLONOSCOPY THRU STOMA, DIAGNOSTIC  2006    due 2010            Social History:     Social History     Tobacco Use     Smoking status: Never     Passive exposure: Never     Smokeless tobacco: Never   Vaping Use     Vaping status: Never Used   Substance Use Topics     Alcohol use: Never       Marital Status: ***  Living situation: ***  Family support: ***  Vocational History: ***  Tobacco use: ***  Alcohol use: ***  Recreational drug use: ***         Functional history:     Angeli Jacobson is independent with all aspects of *** life.    ADLs: ***  Assistive devices: ***  iADLs (medication management and finances): ***  Hand dominance: ***  Driving: ***           Family History:     Family History   Problem Relation Age of Onset     C.A.D. Father         MI 57     Alcohol/Drug Father         etoh     Coronary Artery Disease Father      Obesity Mother      Osteoporosis Mother      Colon Cancer Brother      Hyperlipidemia Son      Anxiety Disorder Son      Hyperlipidemia Son         very high, experimental drug     C.A.D. Paternal Grandmother         ascvd      Diabetes Maternal Grandmother      Cancer Maternal Grandmother      DANIELA. Paternal Uncle         Mi  age 48     Cancer Maternal Aunt         pancreatic CA     Hyperlipidemia Son      Hyperlipidemia Cousin             Medications:     Current Outpatient Medications   Medication Sig Dispense Refill     anastrozole (ARIMIDEX) 1 MG tablet Take 1 tablet (1 mg) by mouth daily 90 tablet 3     calcium carbonate 600 mg-vitamin D 400 units (CALTRATE) 600-400 MG-UNIT per tablet Take 1 chew tab by mouth daily       clopidogrel (PLAVIX) 75 MG tablet Take 1 tablet (75 mg) by mouth daily 90 tablet 3     denosumab (PROLIA) 60 MG/ML SOSY injection        evolocumab (REPATHA) 140 MG/ML prefilled autoinjector Inject 1 mL (140 mg) Subcutaneous every 14 days 6 mL 3     ezetimibe (ZETIA) 10 MG tablet Take 1 tablet (10 mg) by mouth daily 90 tablet 3     hydrocortisone 2.5 % cream APPLY A THIN LAYER TOPICALLY TO AFFECTED AREA TWICE DAILY FOLLOWED BY A MOISTURIZING PRODUCT       levothyroxine (SYNTHROID/LEVOTHROID) 75 MCG tablet Take 1 tablet (75 mcg) by mouth daily 90 tablet 3     multivitamin, therapeutic (THERA-VIT) TABS tablet Take 1 tablet by mouth daily       nitroFURantoin macrocrystal-monohydrate (MACROBID) 100 MG capsule TAKE 1 CAPSULE BY MOUTH AFTER INTRCOURSE 30 capsule 3     rivaroxaban ANTICOAGULANT (XARELTO) 2.5 MG TABS tablet Take 1 tablet (2.5 mg) by mouth 2 times daily 60 tablet 5     rosuvastatin (CRESTOR) 20 MG tablet Take 1 tablet (20 mg) by mouth daily 90 tablet 3     solifenacin (VESICARE) 5 MG tablet Take 1 tablet (5 mg) by mouth daily 90 tablet 3            Allergies:     Allergies   Allergen Reactions     Celexa [Citalopram Hydrobromide]      Decreased libido              ROS:     A focused ROS is negative other than the symptoms noted above in the HPI.      Constitutional:  Gen: NAD, pleasant and cooperative     Respiratory: denies dyspnea ***  Musculoskeletal: denies any muscle pain, joint pain, neck  "pain or back pain ***  Neurologic: denies any headache, changes in motor or sensory function, no loss of balance or vertigo ***  Psychiatric: denies mood changes; sleeps OK ***  Posture: Spine   Lower extremities               Physical Examiniation:     VITAL SIGNS: /75   Pulse 78   Ht 1.664 m (5' 5.5\")   Wt 72.1 kg (159 lb)   LMP 03/18/2005   SpO2 98%   BMI 26.06 kg/m    BMI: Estimated body mass index is 26.06 kg/m  as calculated from the following:    Height as of this encounter: 1.664 m (5' 5.5\").    Weight as of this encounter: 72.1 kg (159 lb).    Gen: NAD, pleasant and cooperative     Pulm: non-labored breathing in room air    Ext: No Edema in BLE, no tenderness in calves      Gen: In no acute distress, pleasant and cooperative          Neuro/Musculoskeletal    Deep Tendon Reflexes:    Gross evaluation Muscle strength:    Upper extremities:      Lower extremities:    Paresthesias: Upper extremities    Lower extremities    Range of Motion:  +/-  Pain      Upper extremities:      Lower extremities:    Gait :  Steadiness :    Able to walk on toes and heels    Tandem test       Spine Alignment : Posture:  Thoracic:  Thoracolumbar:  Status of  lower ext's :       SLR :  Right            Left    AMAIRANI: Right                Left         Laboratory/Imaging:   Bone Mineral Density:    Spine    Hips    X-RAYS:           Assessment/Plan:     ***  Diagnoses :    A.  B.  C.  D.  E.        1. Patient education: In depth discussion and education was provided about the assessment and implications of each of the below recommendations for management. Patient indicated readiness to learn, all questions were answered and understanding of material presented was confirmed.    2. Work-up:    3. Therapy/equipment/braces:    4. Medications:    5. Interventions:    6. Referral / follow up with other providers:    7. Follow up:    Elías Bridges MD    Physical Medicine & Rehabilitation    I spent a total of *** minutes " face-to-face with Angeli Jacobson during today's office visit. Over 50% of this time was spent counseling the patient and/or coordinating care. See note for details.     *** minutes spent on the date of the encounter doing chart review, history and exam, documentation and further activities as noted above

## 2023-05-18 NOTE — PATIENT INSTRUCTIONS
Test results were discussed with patient and her  who was present during this visit and ultrasound of the soft tissues of the neck to check the presence of aberrant parathyroid tissues was requested.

## 2023-05-18 NOTE — PROGRESS NOTES
Antalgic gait in the setting of discrepancy of lower extremities with the left shorter than right.  Significant edema of the left lower extremity has been addressed and management implemented through physical therapy.  Thoracolumbar care needs radiographs to be obtained.  As a part of screening for osteoporosis we need entire spine x-rays.  Discomfort in the left hip area with denial of pain.  Diagnoses :     A.  Osteoporosis  B.  Hypercalcemia; 10.5  C.  Spine x-rays to be obtained to rule out evidence for compression fracture or wedging of the vertebral bodies  D.  Antalgic gait to be addressed with proper lifting applied to the left shoe  E.  If any pain in the pelvic area to address that in future since she has history of sacral insufficiency fracture and at the present denies any pain    Reviewed tests result: Hypercalcemia with hyperparathyroid, serum parathyroid hormone  level of 108 PG per milliliter(normal 15-65 PG per milliliter)     Ask the patient the Interim Events: No other recent change in the status of health.    Gen: NAD, pleasant and cooperative     Ext:  no edema in BLE, no tenderness in calves    Neuro/MSK:     DTR's no change  weakness no change  paresthesias none reported  Gait : Continues with the antalgic gait on the present discrepancy of lower extremities despite the left that had been provided to her in the past  ROM : Stiffness in the left knee with reduced range of motion, status post the patellar fracture fixation in the past   Spine Alignment :  thoracolumbar curve in the setting of uneven lower extremities  Discrepancy lower ext's .  At the present is about 1 inch left shorter than right  Pain: No pain reported today  SLR : Tightness of hamstrings noted especially on the left side  Crepitus as before    Provide instructions: Discussed adding to the present left that she is using in her left shoe.  Her orthopedic surgeon had recommended 1 inch discrepancy correction and today they  had shoe lift of three fourths of an inch I suggested that she would apply the rest of it.    Future plans and other new needs:   #1 we will obtain ultrasound of the cervical soft tissues regarding presence of aberrant parathyroid gland tissues.  #2 we reviewed her bone mineral density, latest result at lumbar spine she is -2.5 in view of degenerative arthritis at the lumbar spine it could be less than -2.5 which places her at osteo porosis level her right hip BMD is slightly better and is at osteopenia level.  #3 spine x-rays were reviewed she has thoraco lumbar care 'lumbar hyperlordosis and thoracic hyperkyphosis.  #4 I asked  and Mrs. Jacobson that they would call me when the ultrasound of the neck result becomes available so we can discuss the next stage of management  by phone I was provided the cell phone number during this visit.  #5 Mrs. Jacobson have tightness of hamstrings on the left side during this visit we addressed gentle prolonged stretching of the hamstrings on the left.  She has difficulty getting in and out of sitting position smoothly and uses her hands for getting out of the chair we addressed strengthening of gluteal muscles as needed and reviewed the proper exercise for implementation at home.    #6 to increase the left applied to the left shoe as was discussed above.    Elías Bridges MD

## 2023-05-18 NOTE — LETTER
5/18/2023       RE: Angeli Jacobson  [id number]  [facility] [id number]       Dear Colleague,    Thank you for referring your patient, Angeli Jacobson, to the [facility] at [facility]. Please see a copy of my visit note below.      Antalgic gait in the setting of discrepancy of lower extremities with the left shorter than right.  Significant edema of the left lower extremity has been addressed and management implemented through physical therapy.  Thoracolumbar care needs radiographs to be obtained.  As a part of screening for osteoporosis we need entire spine x-rays.  Discomfort in the left hip area with denial of pain.  Diagnoses :     A.  Osteoporosis  B.  Hypercalcemia; 10.5  C.  Spine x-rays to be obtained to rule out evidence for compression fracture or wedging of the vertebral bodies  D.  Antalgic gait to be addressed with proper lifting applied to the left shoe  E.  If any pain in the pelvic area to address that in future since she has history of sacral insufficiency fracture and at the present denies any pain    Reviewed tests result: Hypercalcemia with hyperparathyroid, serum parathyroid hormone  level of 108 PG per milliliter(normal 15-65 PG per milliliter)     Ask the patient the Interim Events: No other recent change in the status of health.    Gen: NAD, pleasant and cooperative     Ext:  no edema in BLE, no tenderness in calves    Neuro/MSK:     DTR's no change  weakness no change  paresthesias none reported  Gait : Continues with the antalgic gait on the present discrepancy of lower extremities despite the left that had been provided to her in the past  ROM : Stiffness in the left knee with reduced range of motion, status post the patellar fracture fixation in the past   Spine Alignment :  thoracolumbar curve in the setting of uneven lower extremities  Discrepancy lower ext's .  At the present is about 1 inch left  shorter than right  Pain: No pain reported today  SLR : Tightness of hamstrings noted especially on the left side  Crepitus as before    Provide instructions: Discussed adding to the present left that she is using in her left shoe.  Her orthopedic surgeon had recommended 1 inch discrepancy correction and today they had shoe lift of three fourths of an inch I suggested that she would apply the rest of it.    Future plans and other new needs:   #1 we will obtain ultrasound of the cervical soft tissues regarding presence of aberrant parathyroid gland tissues.  #2 we reviewed her bone mineral density, latest result at lumbar spine she is -2.5 in view of degenerative arthritis at the lumbar spine it could be less than -2.5 which places her at osteo porosis level her right hip BMD is slightly better and is at osteopenia level.  #3 spine x-rays were reviewed she has thoraco lumbar care 'lumbar hyperlordosis and thoracic hyperkyphosis.  #4 I asked  and Mrs. Jacobson that they would call me when the ultrasound of the neck result becomes available so we can discuss the next stage of management  by phone I was provided the cell phone number during this visit.  #5 Mrs. Jacobson have tightness of hamstrings on the left side during this visit we addressed gentle prolonged stretching of the hamstrings on the left.  She has difficulty getting in and out of sitting position smoothly and uses her hands for getting out of the chair we addressed strengthening of gluteal muscles as needed and reviewed the proper exercise for implementation at home.    #6 to increase the left applied to the left shoe as was discussed above.          Again, thank you for allowing me to participate in the care of your patient.      Sincerely,    LASHAE ALMAZAN MD

## 2023-06-01 ENCOUNTER — HEALTH MAINTENANCE LETTER (OUTPATIENT)
Age: 73
End: 2023-06-01

## 2023-06-01 ENCOUNTER — LAB (OUTPATIENT)
Dept: LAB | Facility: CLINIC | Age: 73
End: 2023-06-01
Payer: COMMERCIAL

## 2023-06-01 DIAGNOSIS — E78.5 HYPERLIPIDEMIA LDL GOAL <70: ICD-10-CM

## 2023-06-01 DIAGNOSIS — R73.01 IMPAIRED FASTING GLUCOSE: ICD-10-CM

## 2023-06-01 DIAGNOSIS — E11.9 DIABETES MELLITUS (H): ICD-10-CM

## 2023-06-01 DIAGNOSIS — E78.5 HYPERLIPIDEMIA LDL GOAL <70: Primary | ICD-10-CM

## 2023-06-01 LAB
ALT SERPL W P-5'-P-CCNC: 24 U/L (ref 10–35)
CHOLEST SERPL-MCNC: 121 MG/DL
CREAT UR-MCNC: 49.4 MG/DL
HBA1C MFR BLD: 5.8 % (ref 0–5.6)
HDLC SERPL-MCNC: 57 MG/DL
LDLC SERPL CALC-MCNC: 36 MG/DL
MICROALBUMIN UR-MCNC: <12 MG/L
MICROALBUMIN/CREAT UR: NORMAL MG/G{CREAT}
NONHDLC SERPL-MCNC: 64 MG/DL
TRIGL SERPL-MCNC: 141 MG/DL

## 2023-06-01 PROCEDURE — 84460 ALANINE AMINO (ALT) (SGPT): CPT

## 2023-06-01 PROCEDURE — 82043 UR ALBUMIN QUANTITATIVE: CPT

## 2023-06-01 PROCEDURE — 83036 HEMOGLOBIN GLYCOSYLATED A1C: CPT

## 2023-06-01 PROCEDURE — 82570 ASSAY OF URINE CREATININE: CPT

## 2023-06-01 PROCEDURE — 80061 LIPID PANEL: CPT

## 2023-06-01 PROCEDURE — 36415 COLL VENOUS BLD VENIPUNCTURE: CPT

## 2023-06-15 ENCOUNTER — OFFICE VISIT (OUTPATIENT)
Dept: CARDIOLOGY | Facility: CLINIC | Age: 73
End: 2023-06-15
Payer: COMMERCIAL

## 2023-06-15 ENCOUNTER — TELEPHONE (OUTPATIENT)
Dept: CARDIOLOGY | Facility: CLINIC | Age: 73
End: 2023-06-15

## 2023-06-15 VITALS
DIASTOLIC BLOOD PRESSURE: 68 MMHG | OXYGEN SATURATION: 97 % | BODY MASS INDEX: 25.31 KG/M2 | WEIGHT: 157.5 LBS | SYSTOLIC BLOOD PRESSURE: 121 MMHG | HEIGHT: 66 IN | HEART RATE: 64 BPM

## 2023-06-15 DIAGNOSIS — E78.5 HYPERLIPIDEMIA LDL GOAL <70: ICD-10-CM

## 2023-06-15 DIAGNOSIS — I25.10 CORONARY ARTERY DISEASE INVOLVING NATIVE CORONARY ARTERY OF NATIVE HEART WITHOUT ANGINA PECTORIS: ICD-10-CM

## 2023-06-15 DIAGNOSIS — I73.9 PAD (PERIPHERAL ARTERY DISEASE) (H): Primary | ICD-10-CM

## 2023-06-15 PROCEDURE — 99214 OFFICE O/P EST MOD 30 MIN: CPT | Performed by: INTERNAL MEDICINE

## 2023-06-15 NOTE — LETTER
6/15/2023    Sepideh Burris PA-C  [address]  [facility] [address]    RE: Angeli Jacobson       Dear Colleague,     I had the pleasure of seeing Angeli Jacobosn in the [facility].  CARDIOLOGY CLINIC FOLLOW-UP VISIT      REASON FOR VISIT:   F/u CAD    PRIMARY CARE PHYSICIAN:  Sepideh Burris        History of Present Illness   Angeli Jacobson is an extremely pleasant 73 year old female here for routine follow-up.  She has a past medical history significant for severe coronary calcification by coronary calcium scan (CAC = 3897 in 2021), remote left thigh myxoid liposarcoma s/p resection in 2000 that resulted in removal of a portion of her femoral artery that was apparently then replaced by graft, recurrent peripheral artery disease in the same area s/p multiple peripheral vascular interventions, most recently a left femoral-popliteal bypass in 1/2019, as well as dyslipidemia and left tibial fracture in 12/2020 with several subsequent orthopedic surgeries, and chronic left lower extremity lymphedema.    I first met her in 8/2021 and she was entirely asymptomatic at the time, so we managed her medically without invasive angiography.  We began by continuing her Crestor and Zetia, but she was quite far from her LDL goal, so we initiated the approval process for PCSK9 inhibitors, and she ended up receiving a willie for coverage of Repatha.  She has done very well with this.  Her LDL has dropped from the 120-140 range, to the 50-60 range or lower.  She has not had any side effects with this.      Today, she continues to be asymptomatic from a cardiac standpoint today, denying any chest pains, shortness of breath, lightheadedness, palpitations, or lower extremity swelling.  She did have a possible thrombus seen on a lower extremity ultrasound so she was switched from aspirin/Plavix to low-dose Xarelto/Plavix by vascular, and has tolerated this well without bleeding issues.  She  has a trip coming up for 3 weeks to [place], and had questions about refrigeration of her Repatha.    Her most recent lipid panel was from 6/1/2023, showing total cholesterol of 121, HDL 57, LDL 36, triglycerides 141, and a normal ALT.  Her chemistry profile from 3/24/2023 showed normal sodium and potassium and normal renal function.  Her most recent ECG was done on 7/21/2021 and showed normal sinus rhythm with right bundle branch block and left anterior fascicular block.  I do not see an echocardiogram in our system.  Her coronary calcium score from 7/1/2021 is described above.      Assessment & Plan     CAD by CT scan (coronary calcium score 3897 in 7/2021), asymptomatic  Severe LLE PAD s/p multiple interventions, most recently femoropopliteal bypass 1/2019  Changed to low-dose Xarelto/Plavix by vascular due to possible LE clot  Familial hyperlipidemia, now well controlled with Repatha  Left tibial fracture in 12/2020 s/p several orthopedic surgeries  Chronic left lower extremity lymphedema      It was a pleasure to meet with Angeli and her  again in clinic today.  I am glad to hear that she continues to do well from a cardiac standpoint.  Her blood pressure and cholesterol is very well controlled on her current regimen.  I think that the switch to low-dose Xarelto/Plavix by vascular was very reasonable, and I am glad to hear that she is tolerating this well without bleeding issues.  The low-dose Xarelto/Plavix combination is perfectly adequate for her coronaries as well.  For now, we will continue all of her current cardiac medications unchanged.  I will also have the pharmacist reach out to her to discuss refrigeration in regards to Repatha and her upcoming trip.  We did discuss potentially switching to monthly injections, but she did not want to do that for now in case this may cause a new side effect.      -Conservative management of elevated, asymptomatic coronary calcium score for now   -Proceed  with invasive angiography if suggestive symptoms develop in the future  -Continue low-dose Xarelto/Plavix  -Continue Crestor 20 mg daily and Zetia 10 mg daily  -Continue Repatha 140 mg every 14 days        Follow-up: 12 months, with labs before hand, or sooner as needed      Spencer Wilder MD  Interventional Cardiology  Pinky 15, 2023        Medications   Current Outpatient Medications   Medication    anastrozole (ARIMIDEX) 1 MG tablet    calcium carbonate 600 mg-vitamin D 400 units (CALTRATE) 600-400 MG-UNIT per tablet    clopidogrel (PLAVIX) 75 MG tablet    denosumab (PROLIA) 60 MG/ML SOSY injection    evolocumab (REPATHA) 140 MG/ML prefilled autoinjector    ezetimibe (ZETIA) 10 MG tablet    hydrocortisone 2.5 % cream    levothyroxine (SYNTHROID/LEVOTHROID) 75 MCG tablet    multivitamin, therapeutic (THERA-VIT) TABS tablet    nitroFURantoin macrocrystal-monohydrate (MACROBID) 100 MG capsule    rivaroxaban ANTICOAGULANT (XARELTO) 2.5 MG TABS tablet    rosuvastatin (CRESTOR) 20 MG tablet    solifenacin (VESICARE) 5 MG tablet     No current facility-administered medications for this visit.     Allergies   Allergies   Allergen Reactions    Celexa [Citalopram Hydrobromide]      Decreased libido         Physical Exam       BP: 121/68 Pulse: 64     SpO2: 97 %      Vital Signs with Ranges  Pulse:  [64] 64  BP: (121)/(68) 121/68  SpO2:  [97 %] 97 %  157 lbs 8 oz    Constitutional: Well-appearing, no acute distress, left lower leg wrapped  Respiratory: Normal respiratory effort, CTAB  Cardiovascular: RRR, no m/r/g.  JVP < 7 cm H2O.  There is no right LE edema.  As before, left lower leg is wrapped with an Ace bandage and there is significant left lower extremity swelling noted.  Normal carotid upstrokes, no carotid bruits.        Thank you for allowing me to participate in the care of your patient.      Sincerely,     Spencer Wilder MD     [facility]  cc:   No  referring provider defined for this encounter.

## 2023-06-15 NOTE — PROGRESS NOTES
CARDIOLOGY CLINIC FOLLOW-UP VISIT      REASON FOR VISIT:   F/u CAD    PRIMARY CARE PHYSICIAN:  Sepideh Burris        History of Present Illness   Angeli Jacobson is an extremely pleasant 73 year old female here for routine follow-up.  She has a past medical history significant for severe coronary calcification by coronary calcium scan (CAC = 3897 in 2021), remote left thigh myxoid liposarcoma s/p resection in 2000 that resulted in removal of a portion of her femoral artery that was apparently then replaced by graft, recurrent peripheral artery disease in the same area s/p multiple peripheral vascular interventions, most recently a left femoral-popliteal bypass in 1/2019, as well as dyslipidemia and left tibial fracture in 12/2020 with several subsequent orthopedic surgeries, and chronic left lower extremity lymphedema.    I first met her in 8/2021 and she was entirely asymptomatic at the time, so we managed her medically without invasive angiography.  We began by continuing her Crestor and Zetia, but she was quite far from her LDL goal, so we initiated the approval process for PCSK9 inhibitors, and she ended up receiving a willie for coverage of Repatha.  She has done very well with this.  Her LDL has dropped from the 120-140 range, to the 50-60 range or lower.  She has not had any side effects with this.      Today, she continues to be asymptomatic from a cardiac standpoint today, denying any chest pains, shortness of breath, lightheadedness, palpitations, or lower extremity swelling.  She did have a possible thrombus seen on a lower extremity ultrasound so she was switched from aspirin/Plavix to low-dose Xarelto/Plavix by vascular, and has tolerated this well without bleeding issues.  She has a trip coming up for 3 weeks to [place], and had questions about refrigeration of her Repatha.    Her most recent lipid panel was from 6/1/2023, showing total cholesterol of 121, HDL 57, LDL 36, triglycerides 141, and a  normal ALT.  Her chemistry profile from 3/24/2023 showed normal sodium and potassium and normal renal function.  Her most recent ECG was done on 7/21/2021 and showed normal sinus rhythm with right bundle branch block and left anterior fascicular block.  I do not see an echocardiogram in our system.  Her coronary calcium score from 7/1/2021 is described above.      Assessment & Plan     1. CAD by CT scan (coronary calcium score 3897 in 7/2021), asymptomatic  2. Severe LLE PAD s/p multiple interventions, most recently femoropopliteal bypass 1/2019  a. Changed to low-dose Xarelto/Plavix by vascular due to possible LE clot  3. Familial hyperlipidemia, now well controlled with Repatha  4. Left tibial fracture in 12/2020 s/p several orthopedic surgeries  5. Chronic left lower extremity lymphedema      It was a pleasure to meet with Angeli and her  again in clinic today.  I am glad to hear that she continues to do well from a cardiac standpoint.  Her blood pressure and cholesterol is very well controlled on her current regimen.  I think that the switch to low-dose Xarelto/Plavix by vascular was very reasonable, and I am glad to hear that she is tolerating this well without bleeding issues.  The low-dose Xarelto/Plavix combination is perfectly adequate for her coronaries as well.  For now, we will continue all of her current cardiac medications unchanged.  I will also have the pharmacist reach out to her to discuss refrigeration in regards to Repatha and her upcoming trip.  We did discuss potentially switching to monthly injections, but she did not want to do that for now in case this may cause a new side effect.      -Conservative management of elevated, asymptomatic coronary calcium score for now   -Proceed with invasive angiography if suggestive symptoms develop in the future  -Continue low-dose Xarelto/Plavix  -Continue Crestor 20 mg daily and Zetia 10 mg daily  -Continue Repatha 140 mg every 14  days        Follow-up: 12 months, with labs before hand, or sooner as needed      Spencer Wilder MD  Interventional Cardiology  Pinky 15, 2023        Medications   Current Outpatient Medications   Medication     anastrozole (ARIMIDEX) 1 MG tablet     calcium carbonate 600 mg-vitamin D 400 units (CALTRATE) 600-400 MG-UNIT per tablet     clopidogrel (PLAVIX) 75 MG tablet     denosumab (PROLIA) 60 MG/ML SOSY injection     evolocumab (REPATHA) 140 MG/ML prefilled autoinjector     ezetimibe (ZETIA) 10 MG tablet     hydrocortisone 2.5 % cream     levothyroxine (SYNTHROID/LEVOTHROID) 75 MCG tablet     multivitamin, therapeutic (THERA-VIT) TABS tablet     nitroFURantoin macrocrystal-monohydrate (MACROBID) 100 MG capsule     rivaroxaban ANTICOAGULANT (XARELTO) 2.5 MG TABS tablet     rosuvastatin (CRESTOR) 20 MG tablet     solifenacin (VESICARE) 5 MG tablet     No current facility-administered medications for this visit.     Allergies   Allergies   Allergen Reactions     Celexa [Citalopram Hydrobromide]      Decreased libido         Physical Exam       BP: 121/68 Pulse: 64     SpO2: 97 %      Vital Signs with Ranges  Pulse:  [64] 64  BP: (121)/(68) 121/68  SpO2:  [97 %] 97 %  157 lbs 8 oz    Constitutional: Well-appearing, no acute distress, left lower leg wrapped  Respiratory: Normal respiratory effort, CTAB  Cardiovascular: RRR, no m/r/g.  JVP < 7 cm H2O.  There is no right LE edema.  As before, left lower leg is wrapped with an Ace bandage and there is significant left lower extremity swelling noted.  Normal carotid upstrokes, no carotid bruits.

## 2023-06-15 NOTE — TELEPHONE ENCOUNTER
Called pt per request of Dr. Wilder to discuss storage of Repatha while pt is on an upcoming trip. Per pt she is going to be flying and going on a cruise, she will be gone about 3 weeks and will be due for a Repatha injection during the trip. Per pt she will have access to a refrigerator to store her medication except while she is traveling to her destination which will probably be around 10 hours. Called to [facility] and spoke with pharmacist who advised OK to store Repatha in original carton at room temperature for up to 30 days. Called back to pt and relayed information, pt verbalized understanding.

## 2023-06-15 NOTE — PROGRESS NOTES
[facility], [place]    Hematology/Oncology Established Patient Note      Today's Date: 6/19/2023    Reason for follow-up:  Right breast cancer.    HISTORY OF PRESENT ILLNESS: Angeli Jacobson is a 73 year old female with history of left thigh liposarcoma status post excision in 2000 with subsequent left leg lymphedema and peripheral arterial disease status post redo left common femoral to popliteal artery bypass in January 2019, who presents with the following oncologic history:  1.  11/7/2022: Screening mammogram showed asymmetry in the right breast at 12:00, retroareolar far posterior.  Left breast negative.  2.  11/15/2022: Diagnostic right mammogram showed asymmetry in the posterior right breast, 8 cm from nipple.  Ultrasound of right breast at 12:00, 2 cm from nipple showed a small benign cyst but no definite sonographic correlate for the mammographic asymmetry.  3.  11/16/2022: Stereotactic guided right breast needle biopsy of 8 mm lesion at 3:00, 8 cm from the nipple showed grade 2 invasive ductal carcinoma, ER positive at %, HI positive at 60-70%, HER2 IHC = 2+ equivocal, HER2/compa FISH negative.  4. 12/14/2022: Underwent right breast lumpectomy with right axillary sentinel lymph node excision with Dr. Shelly Carr.  Pathology showed no residual invasive malignancy; 2 microscopic foci of DCIS, grade 2, largest measuring 1.2 mm; margins negative; total 2 right axillary lymph nodes negative.  5. 3/21/2023: Completed adjuvant radiation therapy to the right breast.   6. 4/10/2023: Started anastrozole.    INTERVAL HISTORY:  Angeli reports physical tiredness and joint stiffness since starting anastrozole. Her  has noticed mental slowness.  She has tried adding protein powder which has helped a bit with energy level. She has a 3-week [place] cruise coming up end of 7/2023.      REVIEW OF SYSTEMS:   14 point ROS was reviewed and is negative other than as noted above in HPI.        HOME MEDICATIONS:  Current Outpatient Medications   Medication Sig Dispense Refill     anastrozole (ARIMIDEX) 1 MG tablet Take 1 tablet (1 mg) by mouth daily 90 tablet 3     calcium carbonate 600 mg-vitamin D 400 units (CALTRATE) 600-400 MG-UNIT per tablet Take 1 chew tab by mouth daily       clopidogrel (PLAVIX) 75 MG tablet Take 1 tablet (75 mg) by mouth daily 90 tablet 3     denosumab (PROLIA) 60 MG/ML SOSY injection        evolocumab (REPATHA) 140 MG/ML prefilled autoinjector Inject 1 mL (140 mg) Subcutaneous every 14 days 6 mL 3     ezetimibe (ZETIA) 10 MG tablet Take 1 tablet (10 mg) by mouth daily 90 tablet 3     hydrocortisone 2.5 % cream APPLY A THIN LAYER TOPICALLY TO AFFECTED AREA TWICE DAILY FOLLOWED BY A MOISTURIZING PRODUCT       levothyroxine (SYNTHROID/LEVOTHROID) 75 MCG tablet Take 1 tablet (75 mcg) by mouth daily 90 tablet 3     multivitamin, therapeutic (THERA-VIT) TABS tablet Take 1 tablet by mouth daily       nitroFURantoin macrocrystal-monohydrate (MACROBID) 100 MG capsule TAKE 1 CAPSULE BY MOUTH AFTER INTRCOURSE 30 capsule 3     rivaroxaban ANTICOAGULANT (XARELTO) 2.5 MG TABS tablet Take 1 tablet (2.5 mg) by mouth 2 times daily 60 tablet 5     rosuvastatin (CRESTOR) 20 MG tablet Take 1 tablet (20 mg) by mouth daily 90 tablet 3     solifenacin (VESICARE) 5 MG tablet Take 1 tablet (5 mg) by mouth daily 90 tablet 3         ALLERGIES:  Allergies   Allergen Reactions     Celexa [Citalopram Hydrobromide]      Decreased libido         PAST MEDICAL HISTORY:  Past Medical History:   Diagnosis Date     * * * SBE PROPHYLAXIS * * * 1998    Amox 500mg, take 4 tabs one hour prior to procedure.Takes this because of lymphedema secondary from leg surgey     Antiplatelet or antithrombotic long-term use      Arrhythmia      Central serous retinopathy 2001    Resolved 9/2001     CHRONIC NECK PAIN 1995     Depressive disorder      Depressive disorder, not elsewhere classified 2001     Elevated coronary  artery calcium score=2021     Elevated coronary artery calcium score=2021     History of blood transfusion 2019    during left hip pinning, during surgery for patella     Lateral epicondylitis      MIXED HYPERLIPIDEMIA, LDL GOAL <160     LDL goal < 160     Motion sickness      MYXOID LIPOSARCOMA     Left thigh, S/P excision, radiation  at [facility]     Myxoid liposarcoma (HCC) 2004    CHRONIC LEFT THIGH LYMPHEDEMA     Nontoxic multinodular goiter 2005    needs yearly US     Osteoporosis, unspecified      Other chronic pain     fx ribs left      Other lymphedema 2000    left thigh, gets regular PT for this     Overactive bladder      PAD (peripheral artery disease) (H) 2018     PONV (postoperative nausea and vomiting)      SHINGLES      Sprain of lumbosacral (joint) (ligament)     right     Unspecified hearing loss     chronic tinnitus     Unspecified tinnitus      Gynecologic history:  Age of menarche at 11, , age of first pregnancy at 29, 5 years of OCP use, no HRT or fertility treatment.    PAST SURGICAL HISTORY:  Past Surgical History:   Procedure Laterality Date     ARTHROPLASTY HIP Left 10/4/2019    Procedure: Removal of left femoral hardware with a conversion to left total hip arthroplasty using a Biomet Annalisa femoral stem with an OsseoTi acetabular shell and a dual mobility bearing surface;  Surgeon: Spencer Celeste MD;  Location: [facility] OR     BIOPSY  2000     BIOPSY BREAST SEED LOCALIZATION Right 2022    Procedure: right breast tag localized lumpectomy;  Surgeon: Shelly Carr MD;  Location: [facility] OR     BIOPSY NODE SENTINEL Right 2022    Procedure: with right sentinel lymph node biopsy;  Surgeon: Shelly Carr MD;  Location: [facility] OR     BYPASS GRAFT FEMOROPOPLITEAL Left 2019    Procedure: LEFT FEMORAL TO ABOVE KNEE POPLITEAL  BYPASS WITH POLYTETRAFLUOROETHYLENE GRAFT;  Surgeon: Shade Owens  MD Ronni;  Location: [facility] OR     COLONOSCOPY N/A 2/5/2016    Procedure: COMBINED COLONOSCOPY, SINGLE OR MULTIPLE BIOPSY/POLYPECTOMY BY BIOPSY;  Surgeon: Varun Stanley MD, MD;  Location:  GI     COLONOSCOPY N/A 12/10/2021    Procedure: COLONOSCOPY, WITH POLYPECTOMIES  USING COLD  SNARE,   CLIP APPLIED X2;  Surgeon: Dayton Luna MD;  Location:  GI     CYSTOSCOPY       EXCISION MALIG LESION>1.25CM  5/2000    Myxoid Liposarcoma       EXPLORE GROIN Right 5/1/2018    Procedure: EXPLORE GROIN;  EMERGENCY RIGHT FEMORAL EXPLORATION WITH FEMORAL ARTERY REPAIR.    EBL: 50mL;  Surgeon: Shade Owens MD;  Location: [facility] OR     HC COLP CERVIX/UPPER VAGINA  07/1997    Negative     HC DILATION/CURETTAGE DIAG/THER NON OB  02/1997    Post menopausal bleeding on HRT, negative     HIP SURGERY Left 04/13/2019     IR ANGIOGRAM THROUGH CATHETER FOLLOW UP  12/20/2018     IR ANGIOGRAM THROUGH CATHETER FOLLOW UP  12/21/2018     IR LOWER EXTREMITY ANGIOGRAM LEFT  12/19/2018     OPEN REDUCTION INTERNAL FIXATION PATELLA Left 12/21/2020    Procedure: Left partial patella fracture excision with advancement of the quadriceps tendon;  Surgeon: Stephen Rhodes MD;  Location: [facility] OR     OPEN REDUCTION INTERNAL FIXATION RODDING INTRAMEDULLARY TIBIA Left 12/21/2020    Procedure: Open reduction intramedullary nailing of left tibia fracture;  Surgeon: Stephen Rhodes MD;  Location: [facility] OR     REMOVE HARDWARE KNEE Left 5/31/2022    Procedure: Left knee patella hardware removal;  Surgeon: Spencer Celeste MD;  Location: [facility] OR     REPAIR TENDON QUADRICEPS Left 7/28/2021    Procedure: Left knee quadricepsplasty with intraoperative patellar fracture requiring open reduction and internal fixation of the patella;  Surgeon: Spencer Celeste MD;  Location: [facility] OR     REPAIR TENDON QUADRICEPS Left 5/31/2022    Procedure: Open left knee VY quadricepsplasty with hardware removal and allograft;  Surgeon: Spencer Celeste MD;   Location: [facility] OR     VASCULAR SURGERY  04/30/2018    Left SFA stent in bypass graft     ZZC APPENDECTOMY      Appendectomy     ZZC NONSPECIFIC PROCEDURE  04/2000    Open Biopsy Left Thigh Liposarcoma     ZZHC COLONOSCOPY THRU STOMA, DIAGNOSTIC  2006    due 2010         SOCIAL HISTORY:  Social History     Socioeconomic History     Marital status:      Spouse name: Chris     Number of children: 3     Years of education: 14     Highest education level: Associate degree: academic program   Occupational History     Occupation: at home     Employer: NONE    Tobacco Use     Smoking status: Never     Passive exposure: Never     Smokeless tobacco: Never   Vaping Use     Vaping status: Never Used   Substance and Sexual Activity     Alcohol use: Never     Drug use: Never     Sexual activity: Yes     Partners: Male     Birth control/protection: Male Surgical     Comment:  had a vasectomy   Other Topics Concern     Parent/sibling w/ CABG, MI or angioplasty before 65F 55M? No   Social History Narrative     Not on file     Social Determinants of Health     Financial Resource Strain: Low Risk  (12/16/2021)    Overall Financial Resource Strain (CARDIA)      Difficulty of Paying Living Expenses: Not hard at all   Food Insecurity: No Food Insecurity (12/16/2021)    Hunger Vital Sign      Worried About Running Out of Food in the Last Year: Never true      Ran Out of Food in the Last Year: Never true   Transportation Needs: No Transportation Needs (12/16/2021)    PRAPARE - Transportation      Lack of Transportation (Medical): No      Lack of Transportation (Non-Medical): No   Physical Activity: Inactive (12/16/2021)    Exercise Vital Sign      Days of Exercise per Week: 0 days      Minutes of Exercise per Session: 0 min   Stress: Stress Concern Present (12/16/2021)    [place] [employer] - Occupational Stress Questionnaire      Feeling of Stress : To some extent   Social Connections: Moderately  "Integrated (2021)    Social Connection and Isolation Panel [NHANES]      Frequency of Communication with Friends and Family: Never      Frequency of Social Gatherings with Friends and Family: Never      Attends Rastafari Services: More than 4 times per year      Active Member of Clubs or Organizations: Yes      Attends Club or Organization Meetings: Not on file      Marital Status:    Intimate Partner Violence: Not on file   Housing Stability: Low Risk  (2021)    Housing Stability Vital Sign      Unable to Pay for Housing in the Last Year: No      Number of Places Lived in the Last Year: 1      Unstable Housing in the Last Year: No         FAMILY HISTORY:  Family History   Problem Relation Age of Onset     C.A.D. Father         MI 57     Alcohol/Drug Father         etoh     Coronary Artery Disease Father      Obesity Mother      Osteoporosis Mother      Colon Cancer Brother      Hyperlipidemia Son      Anxiety Disorder Son      Hyperlipidemia Son         very high, experimental drug     C.A.D. Paternal Grandmother         ascvd     Diabetes Maternal Grandmother      Cancer Maternal Grandmother      C.A.D. Paternal Uncle         Mi  age 48     Cancer Maternal Aunt         pancreatic CA     Hyperlipidemia Son      Hyperlipidemia Cousin      Brother with colon cancer.  Negative for breast, ovarian or prostate cancer.    PHYSICAL EXAM:  Vital signs:  /79   Pulse 67   Resp 16   Ht 1.664 m (5' 5.5\")   Wt 70.8 kg (156 lb)   LMP 2005   SpO2 97%   BMI 25.56 kg/m     ECO  GENERAL/CONSTITUTIONAL: No acute distress.  EYES: No erythema or scleral icterus.  ENT/MOUTH: Neck supple.  LYMPH: No cervical, supraclavicular, axillary adenopathy.   BREAST: No palpable masses in either breast. Nipples are everted bilaterally with no discharge. No erythema, ulceration, or dimpling of the skin.  RESPIRATORY: No audible cough or wheezing.   GASTROINTESTINAL: No hepatosplenomegaly, masses, or " tenderness. No guarding.  No distention.  MUSCULOSKELETAL: Warm and well-perfused, no cyanosis, clubbing, or edema.  NEUROLOGIC: No focal motor deficits. Alert, oriented, answers questions appropriately.  INTEGUMENTARY: No rashes or jaundice.  GAIT: Steady, does not use assistive device    LABS:  CBC RESULTS: Recent Labs   Lab Test 12/08/22  1735   WBC 5.6   RBC 4.51   HGB 13.5   HCT 42.5   MCV 94   MCH 29.9   MCHC 31.8   RDW 14.4        Last Comprehensive Metabolic Panel:  Sodium   Date Value Ref Range Status   03/24/2023 142 136 - 145 mmol/L Final   05/17/2021 142 133 - 144 mmol/L Final     Potassium   Date Value Ref Range Status   03/24/2023 4.3 3.4 - 5.3 mmol/L Final   05/26/2022 3.9 3.4 - 5.3 mmol/L Final   05/17/2021 4.3 3.4 - 5.3 mmol/L Final     Chloride   Date Value Ref Range Status   03/24/2023 106 98 - 107 mmol/L Final   05/26/2022 106 94 - 109 mmol/L Final   05/17/2021 107 94 - 109 mmol/L Final     Carbon Dioxide   Date Value Ref Range Status   05/17/2021 32 20 - 32 mmol/L Final     Carbon Dioxide (CO2)   Date Value Ref Range Status   03/24/2023 23 22 - 29 mmol/L Final   05/26/2022 29 20 - 32 mmol/L Final     Anion Gap   Date Value Ref Range Status   03/24/2023 13 7 - 15 mmol/L Final   05/26/2022 4 3 - 14 mmol/L Final   05/17/2021 3 3 - 14 mmol/L Final     Glucose   Date Value Ref Range Status   03/24/2023 91 70 - 99 mg/dL Final   06/01/2022 137 (H) 70 - 99 mg/dL Final   05/17/2021 78 70 - 99 mg/dL Final     Comment:     Fasting specimen     Urea Nitrogen   Date Value Ref Range Status   03/24/2023 13.4 8.0 - 23.0 mg/dL Final   05/26/2022 17 7 - 30 mg/dL Final   05/17/2021 13 7 - 30 mg/dL Final     Creatinine   Date Value Ref Range Status   03/24/2023 0.64 0.51 - 0.95 mg/dL Final   05/17/2021 0.75 0.52 - 1.04 mg/dL Final     GFR Estimate   Date Value Ref Range Status   03/24/2023 >90 >60 mL/min/1.73m2 Final     Comment:     eGFR calculated using 2021 CKD-EPI equation.   05/17/2021 81 >60  mL/min/[1.73_m2] Final     Comment:     Non  GFR Calc  Starting 12/18/2018, serum creatinine based estimated GFR (eGFR) will be   calculated using the Chronic Kidney Disease Epidemiology Collaboration   (CKD-EPI) equation.       Calcium   Date Value Ref Range Status   03/24/2023 9.2 8.8 - 10.2 mg/dL Final   05/17/2021 9.3 8.5 - 10.1 mg/dL Final     Bilirubin Total   Date Value Ref Range Status   03/24/2023 0.3 <=1.2 mg/dL Final   05/17/2021 0.4 0.2 - 1.3 mg/dL Final     Alkaline Phosphatase   Date Value Ref Range Status   03/24/2023 76 35 - 104 U/L Final   05/17/2021 88 40 - 150 U/L Final     ALT   Date Value Ref Range Status   06/01/2023 24 10 - 35 U/L Final   05/17/2021 26 0 - 50 U/L Final     AST   Date Value Ref Range Status   03/24/2023 34 10 - 35 U/L Final   05/17/2021 20 0 - 45 U/L Final       PATHOLOGY:  None new.    IMAGING:  Reviewed as per A/P.    ASSESSMENT/PLAN:  Angeli Jacobson is a 73 year old female with the following issues:  1. Pathologic prognostic stage IA, uH8d-T2-X7, grade 2 invasive ductal carcinoma of the right upper outer breast, ER positive (%), CO positive (70%), HER2/compa FISH negative  -Angeli is s/p right lumpectomy and radiation completed 3/21/2023.    --She has no clinical evidence for recurrent breast cancer by physical exam or right mammogram reviewed from 5/16/2023.  --I advised a total of 5 years of hormone blockade therapy.  --Due to increase in mental and physical fatigue, I suggested she discontinue anastrozole then start letrozole after she returns from her end of July 2023  cruise/trip.  Discussed potential adverse effects.  She agrees to try letrozole in 8/2023.  --Tamoxifen would be less optimal given concern for increased risk of blood clots.  --Due for next annual bilateral mammogram for 11/2023.    2.  History of myxoid liposarcoma of left thigh  - Status post excision and radiation completed in 2000.  She did not receive  chemotherapy for her liposarcoma.  This has resulted in left lower extremity lymphedema.  - Continue lymphedema therapy and compression stockings.    3.  Peripheral arterial disease  -Has history of left lower extremity peripheral arterial disease and is status post femoral-popliteal bypass with later stent to bypass.  She is on aspirin and Plavix.    4. Osteoporosis  --History of taking alendronate, off for past year.  - DEXA scan from 12/22/2022 showed osteoporosis.  - Continue adequate calcium and vitamin D, denosumab/Prolia, next due 7/10/2023.    Return in 4 months.    Emilia Angulo MD  Hematology/Oncology  [facility]    Total time spent: 30 minutes in chart review, patient evaluation, counseling, documentation, test and/or medication/prescription orders, and coordination of care.

## 2023-06-16 ENCOUNTER — TELEPHONE (OUTPATIENT)
Dept: FAMILY MEDICINE | Facility: CLINIC | Age: 73
End: 2023-06-16
Payer: COMMERCIAL

## 2023-06-16 NOTE — TELEPHONE ENCOUNTER
Patient Quality Outreach    Patient is due for the following:   Diabetes -  Eye Exam    Next Steps:   No follow up needed at this time.    Type of outreach:    Chart review performed, no outreach needed. and Pt had a diabetic eye exam at [facility] on 4/24/23      Questions for provider review:    None           Katherine Ibanez, [facility]

## 2023-06-19 ENCOUNTER — LAB (OUTPATIENT)
Dept: INFUSION THERAPY | Facility: CLINIC | Age: 73
End: 2023-06-19
Attending: INTERNAL MEDICINE
Payer: COMMERCIAL

## 2023-06-19 ENCOUNTER — ONCOLOGY VISIT (OUTPATIENT)
Dept: ONCOLOGY | Facility: CLINIC | Age: 73
End: 2023-06-19
Attending: INTERNAL MEDICINE
Payer: COMMERCIAL

## 2023-06-19 VITALS
OXYGEN SATURATION: 97 % | DIASTOLIC BLOOD PRESSURE: 79 MMHG | HEART RATE: 67 BPM | RESPIRATION RATE: 16 BRPM | HEIGHT: 66 IN | WEIGHT: 156 LBS | SYSTOLIC BLOOD PRESSURE: 119 MMHG | BODY MASS INDEX: 25.07 KG/M2

## 2023-06-19 DIAGNOSIS — M81.0 AGE-RELATED OSTEOPOROSIS WITHOUT CURRENT PATHOLOGICAL FRACTURE: Primary | ICD-10-CM

## 2023-06-19 DIAGNOSIS — Z17.0 MALIGNANT NEOPLASM OF UPPER-OUTER QUADRANT OF RIGHT BREAST IN FEMALE, ESTROGEN RECEPTOR POSITIVE (H): Primary | ICD-10-CM

## 2023-06-19 DIAGNOSIS — C50.411 MALIGNANT NEOPLASM OF UPPER-OUTER QUADRANT OF RIGHT BREAST IN FEMALE, ESTROGEN RECEPTOR POSITIVE (H): Primary | ICD-10-CM

## 2023-06-19 DIAGNOSIS — M81.0 AGE-RELATED OSTEOPOROSIS WITHOUT CURRENT PATHOLOGICAL FRACTURE: ICD-10-CM

## 2023-06-19 LAB
CALCIUM SERPL-MCNC: 10.2 MG/DL (ref 8.8–10.2)
CREAT SERPL-MCNC: 0.64 MG/DL (ref 0.51–0.95)
GFR SERPL CREATININE-BSD FRML MDRD: >90 ML/MIN/1.73M2
HOLD SPECIMEN: NORMAL

## 2023-06-19 PROCEDURE — 82310 ASSAY OF CALCIUM: CPT | Performed by: INTERNAL MEDICINE

## 2023-06-19 PROCEDURE — G0463 HOSPITAL OUTPT CLINIC VISIT: HCPCS | Performed by: INTERNAL MEDICINE

## 2023-06-19 PROCEDURE — 99214 OFFICE O/P EST MOD 30 MIN: CPT | Performed by: INTERNAL MEDICINE

## 2023-06-19 PROCEDURE — 36415 COLL VENOUS BLD VENIPUNCTURE: CPT | Performed by: INTERNAL MEDICINE

## 2023-06-19 PROCEDURE — 82565 ASSAY OF CREATININE: CPT | Performed by: INTERNAL MEDICINE

## 2023-06-19 RX ORDER — LETROZOLE 2.5 MG/1
2.5 TABLET, FILM COATED ORAL DAILY
Qty: 90 TABLET | Refills: 3 | Status: SHIPPED | OUTPATIENT
Start: 2023-06-19 | End: 2023-09-06

## 2023-06-19 ASSESSMENT — PAIN SCALES - GENERAL: PAINLEVEL: NO PAIN (0)

## 2023-06-19 NOTE — LETTER
6/19/2023         RE: Angeli Jacobson  [id number]  [facility] [address]        Dear Colleague,    Thank you for referring your patient, Angeli Jacobson, to the [facility]. Please see a copy of my visit note below.    [facility]    Hematology/Oncology Established Patient Note      Today's Date: 6/19/2023    Reason for follow-up:  Right breast cancer.    HISTORY OF PRESENT ILLNESS: Angeli Jacobson is a 73 year old female with history of left thigh liposarcoma status post excision in 2000 with subsequent left leg lymphedema and peripheral arterial disease status post redo left common femoral to popliteal artery bypass in January 2019, who presents with the following oncologic history:  1.  11/7/2022: Screening mammogram showed asymmetry in the right breast at 12:00, retroareolar far posterior.  Left breast negative.  2.  11/15/2022: Diagnostic right mammogram showed asymmetry in the posterior right breast, 8 cm from nipple.  Ultrasound of right breast at 12:00, 2 cm from nipple showed a small benign cyst but no definite sonographic correlate for the mammographic asymmetry.  3.  11/16/2022: Stereotactic guided right breast needle biopsy of 8 mm lesion at 3:00, 8 cm from the nipple showed grade 2 invasive ductal carcinoma, ER positive at %, MO positive at 60-70%, HER2 IHC = 2+ equivocal, HER2/compa FISH negative.  4. 12/14/2022: Underwent right breast lumpectomy with right axillary sentinel lymph node excision with Dr. Shelly Carr.  Pathology showed no residual invasive malignancy; 2 microscopic foci of DCIS, grade 2, largest measuring 1.2 mm; margins negative; total 2 right axillary lymph nodes negative.  5. 3/21/2023: Completed adjuvant radiation therapy to the right breast.   6. 4/10/2023: Started anastrozole.    INTERVAL HISTORY:  Angeli reports physical tiredness and joint stiffness since starting anastrozole. Her  has  noticed mental slowness.  She has tried adding protein powder which has helped a bit with energy level. She has a 3-week [place] cruise coming up end of 7/2023.      REVIEW OF SYSTEMS:   14 point ROS was reviewed and is negative other than as noted above in HPI.       HOME MEDICATIONS:  Current Outpatient Medications   Medication Sig Dispense Refill     anastrozole (ARIMIDEX) 1 MG tablet Take 1 tablet (1 mg) by mouth daily 90 tablet 3     calcium carbonate 600 mg-vitamin D 400 units (CALTRATE) 600-400 MG-UNIT per tablet Take 1 chew tab by mouth daily       clopidogrel (PLAVIX) 75 MG tablet Take 1 tablet (75 mg) by mouth daily 90 tablet 3     denosumab (PROLIA) 60 MG/ML SOSY injection        evolocumab (REPATHA) 140 MG/ML prefilled autoinjector Inject 1 mL (140 mg) Subcutaneous every 14 days 6 mL 3     ezetimibe (ZETIA) 10 MG tablet Take 1 tablet (10 mg) by mouth daily 90 tablet 3     hydrocortisone 2.5 % cream APPLY A THIN LAYER TOPICALLY TO AFFECTED AREA TWICE DAILY FOLLOWED BY A MOISTURIZING PRODUCT       levothyroxine (SYNTHROID/LEVOTHROID) 75 MCG tablet Take 1 tablet (75 mcg) by mouth daily 90 tablet 3     multivitamin, therapeutic (THERA-VIT) TABS tablet Take 1 tablet by mouth daily       nitroFURantoin macrocrystal-monohydrate (MACROBID) 100 MG capsule TAKE 1 CAPSULE BY MOUTH AFTER INTRCOURSE 30 capsule 3     rivaroxaban ANTICOAGULANT (XARELTO) 2.5 MG TABS tablet Take 1 tablet (2.5 mg) by mouth 2 times daily 60 tablet 5     rosuvastatin (CRESTOR) 20 MG tablet Take 1 tablet (20 mg) by mouth daily 90 tablet 3     solifenacin (VESICARE) 5 MG tablet Take 1 tablet (5 mg) by mouth daily 90 tablet 3         ALLERGIES:  Allergies   Allergen Reactions     Celexa [Citalopram Hydrobromide]      Decreased libido         PAST MEDICAL HISTORY:  Past Medical History:   Diagnosis Date     * * * SBE PROPHYLAXIS * * * 1998    Amox 500mg, take 4 tabs one hour prior to procedure.Takes this because of lymphedema secondary from  leg surgey     Antiplatelet or antithrombotic long-term use      Arrhythmia      Central serous retinopathy 2001    Resolved 2001     CHRONIC NECK PAIN      Depressive disorder      Depressive disorder, not elsewhere classified      Elevated coronary artery calcium score=2021     Elevated coronary artery calcium score=2021     History of blood transfusion 2019    during left hip pinning, during surgery for patella     Lateral epicondylitis      MIXED HYPERLIPIDEMIA, LDL GOAL <160     LDL goal < 160     Motion sickness      MYXOID LIPOSARCOMA 2000    Left thigh, S/P excision, radiation  at [facility]     Myxoid liposarcoma (HCC) 2004    CHRONIC LEFT THIGH LYMPHEDEMA     Nontoxic multinodular goiter 2005    needs yearly US     Osteoporosis, unspecified      Other chronic pain     fx ribs left      Other lymphedema 2000    left thigh, gets regular PT for this     Overactive bladder      PAD (peripheral artery disease) (H) 2018     PONV (postoperative nausea and vomiting)      SHINGLES      Sprain of lumbosacral (joint) (ligament)     right     Unspecified hearing loss     chronic tinnitus     Unspecified tinnitus      Gynecologic history:  Age of menarche at 11, , age of first pregnancy at 29, 5 years of OCP use, no HRT or fertility treatment.    PAST SURGICAL HISTORY:  Past Surgical History:   Procedure Laterality Date     ARTHROPLASTY HIP Left 10/4/2019    Procedure: Removal of left femoral hardware with a conversion to left total hip arthroplasty using a Biomet Annalisa femoral stem with an OsseoTi acetabular shell and a dual mobility bearing surface;  Surgeon: Spencer Celeste MD;  Location: [facility] OR     BIOPSY  2000     BIOPSY BREAST SEED LOCALIZATION Right 2022    Procedure: right breast tag localized lumpectomy;  Surgeon: Shelly Carr MD;  Location: [facility] OR     BIOPSY NODE SENTINEL Right 2022     Procedure: with right sentinel lymph node biopsy;  Surgeon: Shelly Carr MD;  Location:  OR     BYPASS GRAFT FEMOROPOPLITEAL Left 1/21/2019    Procedure: LEFT FEMORAL TO ABOVE KNEE POPLITEAL  BYPASS WITH POLYTETRAFLUOROETHYLENE GRAFT;  Surgeon: Shade Owens MD;  Location:  OR     COLONOSCOPY N/A 2/5/2016    Procedure: COMBINED COLONOSCOPY, SINGLE OR MULTIPLE BIOPSY/POLYPECTOMY BY BIOPSY;  Surgeon: Varun Stanley MD, MD;  Location:  GI     COLONOSCOPY N/A 12/10/2021    Procedure: COLONOSCOPY, WITH POLYPECTOMIES  USING COLD  SNARE,   CLIP APPLIED X2;  Surgeon: Dayton Luna MD;  Location:  GI     CYSTOSCOPY       EXCISION MALIG LESION>1.25CM  5/2000    Myxoid Liposarcoma       EXPLORE GROIN Right 5/1/2018    Procedure: EXPLORE GROIN;  EMERGENCY RIGHT FEMORAL EXPLORATION WITH FEMORAL ARTERY REPAIR.    EBL: 50mL;  Surgeon: Shade Owens MD;  Location:  OR     HC COLP CERVIX/UPPER VAGINA  07/1997    Negative     HC DILATION/CURETTAGE DIAG/THER NON OB  02/1997    Post menopausal bleeding on HRT, negative     HIP SURGERY Left 04/13/2019     IR ANGIOGRAM THROUGH CATHETER FOLLOW UP  12/20/2018     IR ANGIOGRAM THROUGH CATHETER FOLLOW UP  12/21/2018     IR LOWER EXTREMITY ANGIOGRAM LEFT  12/19/2018     OPEN REDUCTION INTERNAL FIXATION PATELLA Left 12/21/2020    Procedure: Left partial patella fracture excision with advancement of the quadriceps tendon;  Surgeon: Stephen Rhodes MD;  Location:  OR     OPEN REDUCTION INTERNAL FIXATION RODDING INTRAMEDULLARY TIBIA Left 12/21/2020    Procedure: Open reduction intramedullary nailing of left tibia fracture;  Surgeon: Stephen Rhodes MD;  Location:  OR     REMOVE HARDWARE KNEE Left 5/31/2022    Procedure: Left knee patella hardware removal;  Surgeon: Spencer Celeste MD;  Location:  OR     REPAIR TENDON QUADRICEPS Left 7/28/2021    Procedure: Left knee quadricepsplasty with intraoperative patellar fracture requiring open  reduction and internal fixation of the patella;  Surgeon: Spencer Celeste MD;  Location: [facility] OR     REPAIR TENDON QUADRICEPS Left 5/31/2022    Procedure: Open left knee VY quadricepsplasty with hardware removal and allograft;  Surgeon: Spencer Celeste MD;  Location: [facility] OR     VASCULAR SURGERY  04/30/2018    Left SFA stent in bypass graft     ZZC APPENDECTOMY      Appendectomy     ZZC NONSPECIFIC PROCEDURE  04/2000    Open Biopsy Left Thigh Liposarcoma     ZZHC COLONOSCOPY THRU STOMA, DIAGNOSTIC  2006    due 2010         SOCIAL HISTORY:  Social History     Socioeconomic History     Marital status:      Spouse name: Chris     Number of children: 3     Years of education: 14     Highest education level: Associate degree: academic program   Occupational History     Occupation: at home     Employer: NONE    Tobacco Use     Smoking status: Never     Passive exposure: Never     Smokeless tobacco: Never   Vaping Use     Vaping status: Never Used   Substance and Sexual Activity     Alcohol use: Never     Drug use: Never     Sexual activity: Yes     Partners: Male     Birth control/protection: Male Surgical     Comment:  had a vasectomy   Other Topics Concern     Parent/sibling w/ CABG, MI or angioplasty before 65F 55M? No   Social History Narrative     Not on file     Social Determinants of Health     Financial Resource Strain: Low Risk  (12/16/2021)    Overall Financial Resource Strain (CARDIA)      Difficulty of Paying Living Expenses: Not hard at all   Food Insecurity: No Food Insecurity (12/16/2021)    Hunger Vital Sign      Worried About Running Out of Food in the Last Year: Never true      Ran Out of Food in the Last Year: Never true   Transportation Needs: No Transportation Needs (12/16/2021)    PRAPARE - Transportation      Lack of Transportation (Medical): No      Lack of Transportation (Non-Medical): No   Physical Activity: Inactive (12/16/2021)    Exercise Vital Sign      Days of  Exercise per Week: 0 days      Minutes of Exercise per Session: 0 min   Stress: Stress Concern Present (2021)    [employer] [place] [employer] - Occupational Stress Questionnaire      Feeling of Stress : To some extent   Social Connections: Moderately Integrated (2021)    Social Connection and Isolation Panel [NHANES]      Frequency of Communication with Friends and Family: Never      Frequency of Social Gatherings with Friends and Family: Never      Attends [employer]ze Services: More than 4 times per year      Active Member of Clubs or Organizations: Yes      Attends Club or Organization Meetings: Not on file      Marital Status:    Intimate Partner Violence: Not on file   Housing Stability: Low Risk  (2021)    Housing Stability Vital Sign      Unable to Pay for Housing in the Last Year: No      Number of Places Lived in the Last Year: 1      Unstable Housing in the Last Year: No         FAMILY HISTORY:  Family History   Problem Relation Age of Onset     C.A.D. Father         MI 57     Alcohol/Drug Father         etoh     Coronary Artery Disease Father      Obesity Mother      Osteoporosis Mother      Colon Cancer Brother      Hyperlipidemia Son      Anxiety Disorder Son      Hyperlipidemia Son         very high, experimental drug     C.A.D. Paternal Grandmother         ascvd     Diabetes Maternal Grandmother      Cancer Maternal Grandmother      C.A.D. Paternal Uncle         Mi  age 48     Cancer Maternal Aunt         pancreatic CA     Hyperlipidemia Son      Hyperlipidemia Cousin      Brother with colon cancer.  Negative for breast, ovarian or prostate cancer.    PHYSICAL EXAM:  Vital signs:  LMP 2005    ECOG: ***  GENERAL/CONSTITUTIONAL: No acute distress.  EYES: No erythema or scleral icterus.  ENT/MOUTH: Neck supple.  LYMPH: No cervical, supraclavicular, axillary adenopathy.   BREAST: No palpable masses in either breast. Nipples are everted bilaterally with no  discharge. No erythema, ulceration, or dimpling of the skin.  RESPIRATORY: No audible cough or wheezing.   GASTROINTESTINAL: No hepatosplenomegaly, masses, or tenderness. No guarding.  No distention.  MUSCULOSKELETAL: Warm and well-perfused, no cyanosis, clubbing, or edema.  NEUROLOGIC: No focal motor deficits. Alert, oriented, answers questions appropriately.  INTEGUMENTARY: No rashes or jaundice.  GAIT: Steady, does not use assistive device    LABS:  CBC RESULTS: Recent Labs   Lab Test 12/08/22  1735   WBC 5.6   RBC 4.51   HGB 13.5   HCT 42.5   MCV 94   MCH 29.9   MCHC 31.8   RDW 14.4        Last Comprehensive Metabolic Panel:  Sodium   Date Value Ref Range Status   03/24/2023 142 136 - 145 mmol/L Final   05/17/2021 142 133 - 144 mmol/L Final     Potassium   Date Value Ref Range Status   03/24/2023 4.3 3.4 - 5.3 mmol/L Final   05/26/2022 3.9 3.4 - 5.3 mmol/L Final   05/17/2021 4.3 3.4 - 5.3 mmol/L Final     Chloride   Date Value Ref Range Status   03/24/2023 106 98 - 107 mmol/L Final   05/26/2022 106 94 - 109 mmol/L Final   05/17/2021 107 94 - 109 mmol/L Final     Carbon Dioxide   Date Value Ref Range Status   05/17/2021 32 20 - 32 mmol/L Final     Carbon Dioxide (CO2)   Date Value Ref Range Status   03/24/2023 23 22 - 29 mmol/L Final   05/26/2022 29 20 - 32 mmol/L Final     Anion Gap   Date Value Ref Range Status   03/24/2023 13 7 - 15 mmol/L Final   05/26/2022 4 3 - 14 mmol/L Final   05/17/2021 3 3 - 14 mmol/L Final     Glucose   Date Value Ref Range Status   03/24/2023 91 70 - 99 mg/dL Final   06/01/2022 137 (H) 70 - 99 mg/dL Final   05/17/2021 78 70 - 99 mg/dL Final     Comment:     Fasting specimen     Urea Nitrogen   Date Value Ref Range Status   03/24/2023 13.4 8.0 - 23.0 mg/dL Final   05/26/2022 17 7 - 30 mg/dL Final   05/17/2021 13 7 - 30 mg/dL Final     Creatinine   Date Value Ref Range Status   03/24/2023 0.64 0.51 - 0.95 mg/dL Final   05/17/2021 0.75 0.52 - 1.04 mg/dL Final     GFR Estimate    Date Value Ref Range Status   03/24/2023 >90 >60 mL/min/1.73m2 Final     Comment:     eGFR calculated using 2021 CKD-EPI equation.   05/17/2021 81 >60 mL/min/[1.73_m2] Final     Comment:     Non  GFR Calc  Starting 12/18/2018, serum creatinine based estimated GFR (eGFR) will be   calculated using the Chronic Kidney Disease Epidemiology Collaboration   (CKD-EPI) equation.       Calcium   Date Value Ref Range Status   03/24/2023 9.2 8.8 - 10.2 mg/dL Final   05/17/2021 9.3 8.5 - 10.1 mg/dL Final     Bilirubin Total   Date Value Ref Range Status   03/24/2023 0.3 <=1.2 mg/dL Final   05/17/2021 0.4 0.2 - 1.3 mg/dL Final     Alkaline Phosphatase   Date Value Ref Range Status   03/24/2023 76 35 - 104 U/L Final   05/17/2021 88 40 - 150 U/L Final     ALT   Date Value Ref Range Status   06/01/2023 24 10 - 35 U/L Final   05/17/2021 26 0 - 50 U/L Final     AST   Date Value Ref Range Status   03/24/2023 34 10 - 35 U/L Final   05/17/2021 20 0 - 45 U/L Final       PATHOLOGY:  None new.    IMAGING:  Reviewed as per A/P.    ASSESSMENT/PLAN:  Angeli Jacobson is a 73 year old female with the following issues:  1. Pathologic prognostic stage IA, cG1t-L0-P4, grade 2 invasive ductal carcinoma of the right upper outer breast, ER positive (%), MT positive (70%), HER2/compa FISH negative  -Angeli is s/p right lumpectomy and radiation completed 3/21/2023.    --She has no clinical evidence for recurrent breast cancer by physical exam or right mammogram reviewed from 5/16/2023.  --I advised a total of 5 years of hormone blockade therapy.  --Due to increase in mental and physical fatigue, I suggested she discontinue anastrozole then start letrozole after she returns from her end of July 2023  cruise/trip.  Discussed potential adverse effects.  She agrees to try letrozole in 8/2023.  --Tamoxifen would be less optimal given concern for increased risk of blood clots.  --Due for next annual bilateral mammogram  "for 11/2023.    2.  History of myxoid liposarcoma of left thigh  - Status post excision and radiation completed in 2000.  She did not receive chemotherapy for her liposarcoma.  This has resulted in left lower extremity lymphedema.  - Continue lymphedema therapy and compression stockings.    3.  Peripheral arterial disease  -Has history of left lower extremity peripheral arterial disease and is status post femoral-popliteal bypass with later stent to bypass.  She is on aspirin and Plavix.    4. Osteoporosis  --History of taking alendronate, off for past year.  - DEXA scan from 12/22/2022 showed osteoporosis.  - Continue adequate calcium and vitamin D, denosumab/Prolia, next due 7/10/2023.    Return in 4 months.    Emilia Angulo MD  Hematology/Oncology  [facility]    Total time spent: 30 minutes in chart review, patient evaluation, counseling, documentation, test and/or medication/prescription orders, and coordination of care.    Oncology Rooming Note    June 19, 2023 1:48 PM   Angeli Jacobson is a 73 year old female who presents for:    Chief Complaint   Patient presents with     Oncology Clinic Visit     Initial Vitals: LMP 03/18/2005  Estimated body mass index is 25.81 kg/m  as calculated from the following:    Height as of 6/15/23: 1.664 m (5' 5.5\").    Weight as of 6/15/23: 71.4 kg (157 lb 8 oz). There is no height or weight on file to calculate BSA.  Data Unavailable Comment: Data Unavailable   Patient's last menstrual period was 03/18/2005.  Allergies reviewed: Yes  Medications reviewed: Yes    Medications: Medication refills not needed today.  Pharmacy name entered into [employer]: [id number] [place], [place] - [address] [place] [address]    Clinical concerns:  doctor was notified.      Bharti Moreland MA                Again, thank you for allowing me to participate in the care of your patient.        Sincerely,        Emilia Angulo MD    "

## 2023-06-19 NOTE — PROGRESS NOTES
Medical Assistant Note:  Angeli Jacobson presents today for lab draw.    Patient seen by provider today: No.   present during visit today: Not Applicable.    Concerns: No Concerns.    Procedure:  Lab draw site: RAC, Needle type: bf, Gauge: 21. Gauze and coban applied    Post Assessment:  Labs drawn without difficulty: Yes.    Discharge Plan:  Departure Mode: Ambulatory.    Face to Face Time: 5.    Carrie Fagan CMA

## 2023-06-19 NOTE — PROGRESS NOTES
"Oncology Rooming Note    June 19, 2023 1:48 PM   Angeli Jacobson is a 73 year old female who presents for:    Chief Complaint   Patient presents with     Oncology Clinic Visit     Initial Vitals: LMP 03/18/2005  Estimated body mass index is 25.81 kg/m  as calculated from the following:    Height as of 6/15/23: 1.664 m (5' 5.5\").    Weight as of 6/15/23: 71.4 kg (157 lb 8 oz). There is no height or weight on file to calculate BSA.  Data Unavailable Comment: Data Unavailable   Patient's last menstrual period was 03/18/2005.  Allergies reviewed: Yes  Medications reviewed: Yes    Medications: Medication refills not needed today.  Pharmacy name entered into [employer]: [id number] [place], [place] - [address] [place] [address]    Clinical concerns:  doctor was notified.      Bharti Moreland MA            "

## 2023-07-05 ENCOUNTER — TELEPHONE (OUTPATIENT)
Dept: CARDIOLOGY | Facility: CLINIC | Age: 73
End: 2023-07-05
Payer: COMMERCIAL

## 2023-07-05 DIAGNOSIS — Z95.828 HISTORY OF ARTERIAL BYPASS OF LOWER EXTREMITY: ICD-10-CM

## 2023-07-05 DIAGNOSIS — I25.10 CORONARY ARTERY DISEASE INVOLVING NATIVE CORONARY ARTERY OF NATIVE HEART WITHOUT ANGINA PECTORIS: Primary | ICD-10-CM

## 2023-07-05 NOTE — TELEPHONE ENCOUNTER
[facility]    Who is the name of the provider?:  Niki    What is the location you see this provider at/preferred location?: [place]  Person calling / Facility: Angeli Kavon  Phone number:  [phone]  Nurse call back needed:  YES     Reason for call:  Xarelto refill question. Patient travelling and would like to request more to have enough for the travel. Asking for 90 day supply.     Pharmacy location:  n/a  Outside Imaging: n/a   Can we leave a detailed message on this number?  YES

## 2023-07-05 NOTE — TELEPHONE ENCOUNTER
[facility] Call Center    Phone Message    May a detailed message be left on voicemail: yes     Reason for Call: Other: Pt would like a call back as she would like to discuss her symptoms as she has a few questions     Action Taken: Message routed to:  [facility] Cardio    Travel Screening: Not Applicable

## 2023-07-05 NOTE — TELEPHONE ENCOUNTER
Called back to pt, no answer. Left  requesting call back to Team 1.     Addendum 7/6/23 - Called and spoke with pt. Last visit on 6/15/23 with Dr. Wilder. Pt stated she did not mention this at the visit but she feels recently she gets tired too easily. Pt stated she feels like she does not have any energy, wondering if this could be related to her coronary artery calcifications and if any additional testing is needed. Pt stated she is able to complete her normal activities but feels like she needs to push through the tired feeling. Denied any shortness of breath or chest pain at rest or with exertion. Advised will review with Dr. Wilder.

## 2023-07-06 NOTE — TELEPHONE ENCOUNTER
Received response from Dr. Wilder:     Spencer Wilder MD Hair, Ashley W, RN; MIRYAM Santamaria [facility] Heart Team 1  Caller: Unspecified (Yesterday, 11:43 AM)  Nonspecific fatigue is not commonly caused by coronary disease, especially in the absence of any other symptoms, though every person is different.  My suspicion would be that it may be unrelated to the coronaries, perhaps a medication side effect such as anastrozole.  It does look like her TSH was checked relatively recently it was normal.  Given her extensive coronary calcifications, lets check an exercise stress echo just to be sure, but I would suspect that this is probably unrelated.     Thanks,   Chris     Called back to pt, pt was not home. Left message with pt's  Chris requesting call back to Team 1.     Addendum 7/6/23 - Received return call from pt, reviewed Dr. Wilder's recommendations. Pt verbalized understanding and agreement with plan. Pt requested Dr. Wilder's recommendations be sent via [employer] message. Order placed for exercise stress echo, pt will call to schedule.

## 2023-07-07 ENCOUNTER — TELEPHONE (OUTPATIENT)
Dept: CARDIOLOGY | Facility: CLINIC | Age: 73
End: 2023-07-07

## 2023-07-07 NOTE — TELEPHONE ENCOUNTER
Returned call and spoke with  Chris. Notified him RX was sent on 7/5/23 to [facility] pharmacy.    Vika LARA, RN    [facility]  [facility]  Office: [phone]  Fax: [phone]

## 2023-07-07 NOTE — TELEPHONE ENCOUNTER
Patient called and requested pharmacy change to [facility].   Xarelto RX sent to requested pharmacy.    Vika LARA, RN    [facility]  Office: [phone]  Fax: [phone]

## 2023-07-07 NOTE — TELEPHONE ENCOUNTER
[facility] Call Center    Phone Message    May a detailed message be left on voicemail: yes     Reason for Call: Other: Angeli called to schedule her Exercise Stress Test. Please reach out to Angeli to schedule. Thank you!     Action Taken: Other: Cardiology    Travel Screening: Not Applicable     Thank you!  [facility]

## 2023-07-13 ENCOUNTER — ANCILLARY PROCEDURE (OUTPATIENT)
Dept: ULTRASOUND IMAGING | Facility: CLINIC | Age: 73
End: 2023-07-13
Attending: PHYSICAL MEDICINE & REHABILITATION
Payer: COMMERCIAL

## 2023-07-13 DIAGNOSIS — M81.0 AGE-RELATED OSTEOPOROSIS WITHOUT CURRENT PATHOLOGICAL FRACTURE: ICD-10-CM

## 2023-07-13 DIAGNOSIS — E21.3 HYPERPARATHYROIDISM (H): ICD-10-CM

## 2023-07-13 DIAGNOSIS — M84.375G: ICD-10-CM

## 2023-07-13 PROCEDURE — 76536 US EXAM OF HEAD AND NECK: CPT | Mod: GC | Performed by: STUDENT IN AN ORGANIZED HEALTH CARE EDUCATION/TRAINING PROGRAM

## 2023-07-14 ENCOUNTER — HOSPITAL ENCOUNTER (OUTPATIENT)
Dept: CARDIOLOGY | Facility: CLINIC | Age: 73
Discharge: HOME | End: 2023-07-14
Attending: INTERNAL MEDICINE | Admitting: INTERNAL MEDICINE
Payer: COMMERCIAL

## 2023-07-14 DIAGNOSIS — I25.10 CORONARY ARTERY DISEASE INVOLVING NATIVE CORONARY ARTERY OF NATIVE HEART WITHOUT ANGINA PECTORIS: ICD-10-CM

## 2023-07-14 PROCEDURE — 93350 STRESS TTE ONLY: CPT | Mod: 26 | Performed by: INTERNAL MEDICINE

## 2023-07-14 PROCEDURE — 93018 CV STRESS TEST I&R ONLY: CPT | Performed by: INTERNAL MEDICINE

## 2023-07-14 PROCEDURE — 93325 DOPPLER ECHO COLOR FLOW MAPG: CPT | Mod: TC

## 2023-07-14 PROCEDURE — 93017 CV STRESS TEST TRACING ONLY: CPT

## 2023-07-14 PROCEDURE — 93321 DOPPLER ECHO F-UP/LMTD STD: CPT | Mod: 26 | Performed by: INTERNAL MEDICINE

## 2023-07-14 PROCEDURE — 93016 CV STRESS TEST SUPVJ ONLY: CPT | Performed by: INTERNAL MEDICINE

## 2023-07-14 PROCEDURE — 93325 DOPPLER ECHO COLOR FLOW MAPG: CPT | Mod: 26 | Performed by: INTERNAL MEDICINE

## 2023-07-18 ENCOUNTER — OFFICE VISIT (OUTPATIENT)
Dept: UROLOGY | Facility: CLINIC | Age: 73
End: 2023-07-18
Payer: COMMERCIAL

## 2023-07-18 VITALS
SYSTOLIC BLOOD PRESSURE: 122 MMHG | BODY MASS INDEX: 24.43 KG/M2 | DIASTOLIC BLOOD PRESSURE: 74 MMHG | WEIGHT: 152 LBS | HEIGHT: 66 IN

## 2023-07-18 DIAGNOSIS — N32.81 OAB (OVERACTIVE BLADDER): Primary | ICD-10-CM

## 2023-07-18 LAB
ALBUMIN UR-MCNC: NEGATIVE MG/DL
APPEARANCE UR: CLEAR
BILIRUB UR QL STRIP: NEGATIVE
COLOR UR AUTO: YELLOW
GLUCOSE UR STRIP-MCNC: NEGATIVE MG/DL
HGB UR QL STRIP: NEGATIVE
KETONES UR STRIP-MCNC: NEGATIVE MG/DL
LEUKOCYTE ESTERASE UR QL STRIP: ABNORMAL
NITRATE UR QL: NEGATIVE
PH UR STRIP: 6.5 [PH] (ref 5–7)
RESIDUAL VOLUME (RV) (EXTERNAL): 72
SP GR UR STRIP: 1.01 (ref 1–1.03)
UROBILINOGEN UR STRIP-ACNC: 0.2 E.U./DL

## 2023-07-18 PROCEDURE — 81003 URINALYSIS AUTO W/O SCOPE: CPT | Mod: QW | Performed by: PHYSICIAN ASSISTANT

## 2023-07-18 PROCEDURE — 51798 US URINE CAPACITY MEASURE: CPT | Performed by: PHYSICIAN ASSISTANT

## 2023-07-18 PROCEDURE — 99213 OFFICE O/P EST LOW 20 MIN: CPT | Mod: 25 | Performed by: PHYSICIAN ASSISTANT

## 2023-07-18 ASSESSMENT — ENCOUNTER SYMPTOMS
CHILLS: 0
FEVER: 0
HEMATURIA: 0
FREQUENCY: 1
DYSURIA: 0
NAUSEA: 0
VOMITING: 0
SHORTNESS OF BREATH: 0

## 2023-07-18 ASSESSMENT — PAIN SCALES - GENERAL: PAINLEVEL: NO PAIN (0)

## 2023-07-18 NOTE — PROGRESS NOTES
Subjective      CHIEF COMPLAINT/REASON FOR VISIT   Patient of Dr. Oquendo's seen on my calendar  Follow up on OAB    HISTORY OF PRESENT ILLNESS   Ms. Jacobson is very pleasant 73 year old year old female, who presents today patient has follow-up in overactivity of the bladder.  I last saw her in July 2022.  She has previously followed with Dr. Oquendo.  Previously went to pelvic floor physical therapy for pelvic floor dysfunction.    Patient has trialed Toviaz, oxybutynin, and Myrbetriq.  She is now on Vesicare 5 mg, and symptomatology is better.  Her postvoid residual when I last saw her was slightly elevated.    She had a bout of urgency several months ago.  She relates this to possibly being from using a handmade soap.  She is doing better.  Still has some urgency and frequency of urination.    She has gone through breast cancer treatment this year.  Last cystoscopy was in 2019 with Dr. Oquendo.    The following portions of the patient's history were reviewed and updated as appropriate: allergies, current medications, past family history, past medical history, past social history, past surgical history, and problem list.     REVIEW OF SYSTEMS   Review of Systems   Constitutional: Negative for chills and fever.   Respiratory: Negative for shortness of breath.    Cardiovascular: Negative for chest pain.   Gastrointestinal: Negative for nausea and vomiting.   Genitourinary: Positive for frequency and urgency. Negative for dysuria and hematuria.      Per HPI.     Patient Active Problem List   Diagnosis     MULTINODUL GOITER     Hearing loss     Hyperlipidemia LDL goal <70     Osteoporosis     Impaired fasting glucose     Lymphedema of left leg     Tubular adenoma     Chronic pain of left knee     Vertigo     Myxoid liposarcoma (HCC)     PAD (peripheral artery disease) (H)     Vascular graft occlusion (H)     Femoral-popliteal bypass graft occlusion, left (H)     History of sarcoma     S/P ORIF (open reduction internal fixation)  fracture     Hip pain, left     Postural urinary incontinence     Personal history of urinary tract infection     OAB (overactive bladder)     Myalgia of pelvic floor     Lesion of bladder     S/P total hip arthroplasty     Acute pain of left knee     Closed fracture of left tibia and fibula, initial encounter     Closed displaced fracture of left patella, unspecified fracture morphology, initial encounter     Other specified injury of left quadriceps muscle, fascia and tendon, initial encounter     Elevated coronary artery calcium score=7/1/21      Arthrofibrosis of knee joint, left     Hypothyroidism, unspecified type     Malignant neoplasm of upper-outer quadrant of right breast in female, estrogen receptor positive (H)      Past Medical History:   Diagnosis Date     * * * SBE PROPHYLAXIS * * * 1998    Amox 500mg, take 4 tabs one hour prior to procedure.Takes this because of lymphedema secondary from leg surgey     Antiplatelet or antithrombotic long-term use      Arrhythmia      Central serous retinopathy 2001    Resolved 9/2001     CHRONIC NECK PAIN 1995     Depressive disorder      Depressive disorder, not elsewhere classified 2001     Elevated coronary artery calcium score=7/1/21 08/03/2021     Elevated coronary artery calcium score=7/1/21 08/03/2021     History of blood transfusion 04/2019 12/2020    during left hip pinning, during surgery for patella     Lateral epicondylitis      MIXED HYPERLIPIDEMIA, LDL GOAL <160 1998    LDL goal < 160     Motion sickness      MYXOID LIPOSARCOMA 2000    Left thigh, S/P excision, radiation  at 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    CHRONIC LEFT THIGH LYMPHEDEMA     Nontoxic multinodular goiter 2005    needs yearly      Osteoporosis, unspecified 2001     Other chronic pain     fx ribs left      Other lymphedema 2000    left thigh, gets regular PT for this     Overactive bladder      PAD (peripheral artery disease) (H) 04/20/2018     PONV (postoperative  "nausea and vomiting)      SHINGLES 2001     Sprain of lumbosacral (joint) (ligament) 1995    right     Unspecified hearing loss 1998    chronic tinnitus     Unspecified tinnitus 1998        Objective      PHYSICAL EXAM   /74   Ht 1.664 m (5' 5.5\")   Wt 68.9 kg (152 lb)   LMP 03/18/2005   BMI 24.91 kg/m     Physical Exam  Constitutional:       Appearance: Normal appearance.   HENT:      Head: Normocephalic.   Eyes:      General: No scleral icterus.  Pulmonary:      Effort: Pulmonary effort is normal.   Musculoskeletal:      Left lower leg: Edema present.      Comments: Ambulates with a cane.   Skin:     Findings: No rash.   Neurological:      General: No focal deficit present.      Mental Status: She is alert and oriented to person, place, and time.   Psychiatric:         Mood and Affect: Mood normal.         Behavior: Behavior normal.         LABORATORY     Recent Labs   Lab Test 07/18/23  1324 05/09/23  1639   COLOR Yellow  --    APPEARANCE Clear  --    URINEGLC Negative  --    URINEBILI Negative  --    URINEKETONE Negative  --    SG 1.015  --    UBLD Negative  --    URINEPH 6.5  --    PROTEIN Negative  --    UROBILINOGEN 0.2  --    NITRITE Negative  --    LEUKEST Trace*  --    RBCU  --  2-5*   WBCU  --  0-5     TESTING    PVR: 72 mL    Assessment & Plan    1. OAB (overactive bladder)      I had the pleasure today of meeting with Ms. Jacobson to discuss her urinary symptoms.  She is still having urgency and frequency, but is doing relatively well. UA not convincing for infection, and she is emptying better with a PVR of 72 mL.    We discussed options such as continuing as she is, increasing her Vesicare, or possible posterior tibial nerve stimulation.  After discussion with the patient, she would like to consider increasing Vesicare.    -Will have you plan on taking 2-5 mg Vesicare tablets.  After this home trial, contact us to let me know what dosage you would like refilled 5 mg versus 10 mg " tablets.    -Next option for urinary symptoms would be posterior tibial nerve stimulation or possible Botox.  We discussed each of these.  Posterior tibial nerve stimulation is once a week for 12 weeks and is done in our [place] office.  Botox is injected into the bladder.  There is a risk of urinary retention.  Most people get about 6 months out of this.    -Follow up in 1 year for med check and post void residual.    Contact us in the interim with questions, concerns, or changes in symptomatology.    Signed by:       Nancy Chawla PA-C 7/18/2023 1:52 PM

## 2023-07-18 NOTE — NURSING NOTE
Chief Complaint   Patient presents with     Incontinence     OAB     Pt doing well.  Had a bout of urinary urgency recently which she associated with a homemade soap that was gifted to her.  Urgency has improved since stopping use of that soap.    PVR: 72 mL by bladder scan    Viridiana Aquino, EMT

## 2023-07-18 NOTE — PATIENT INSTRUCTIONS
Will have you plan on taking 2-5 mg Vesicare tablets.  After this home trial, contact us to let me know what dosage you would like refilled 5 mg versus 10 mg tablets.    Call [phone] or send a [employer] message.    You have been prescribed medication to help relax your bladder.    This medication may help control (or improve) urinary frequency, urgency and urge incontinence.    Common side effects include:  Dry mouth (Biotene mouthwash can help with this.)  Constipation  Drowsiness/Mental Fogginess  Blurred vision/Dry Eyes  Difficulty with sweating    Rare side effect:  Urinary retention     Next option for urinary symptoms would be posterior tibial nerve stimulation or possible Botox.    Follow up in 1 year for med check and post void residual.    Contact us in the interim with questions, concerns, or changes in symptomatology.  [phone]

## 2023-07-18 NOTE — LETTER
7/18/2023       RE: Angeli Jacobson  [id number]  [facility] [address]     Dear Colleague,    Thank you for referring your patient, Angeli Jacobson, to the [facility] [place] [facility]. Please see a copy of my visit note below.    Subjective      CHIEF COMPLAINT/REASON FOR VISIT   Patient of Dr. Oquendo's seen on my calendar  Follow up on OAB    HISTORY OF PRESENT ILLNESS   Ms. Jacobson is very pleasant 73 year old year old female, who presents today patient has follow-up in overactivity of the bladder.  I last saw her in July 2022.  She has previously followed with Dr. Oquendo.  Previously went to pelvic floor physical therapy for pelvic floor dysfunction.    Patient has trialed Toviaz, oxybutynin, and Myrbetriq.  She is now on Vesicare 5 mg, and symptomatology is better.  Her postvoid residual when I last saw her was slightly elevated.    She had a bout of urgency several months ago.  She relates this to possibly being from using a handmade soap.  She is doing better.  Still has some urgency and frequency of urination.    She has gone through breast cancer treatment this year.  Last cystoscopy was in 2019 with Dr. Oquendo.    The following portions of the patient's history were reviewed and updated as appropriate: allergies, current medications, past family history, past medical history, past social history, past surgical history, and problem list.     REVIEW OF SYSTEMS   Review of Systems   Constitutional: Negative for chills and fever.   Respiratory: Negative for shortness of breath.    Cardiovascular: Negative for chest pain.   Gastrointestinal: Negative for nausea and vomiting.   Genitourinary: Positive for frequency and urgency. Negative for dysuria and hematuria.      Per HPI.     Patient Active Problem List   Diagnosis    MULTINODUL GOITER    Hearing loss    Hyperlipidemia LDL goal <70    Osteoporosis    Impaired fasting  glucose    Lymphedema of left leg    Tubular adenoma    Chronic pain of left knee    Vertigo    Myxoid liposarcoma (HCC)    PAD (peripheral artery disease) (H)    Vascular graft occlusion (H)    Femoral-popliteal bypass graft occlusion, left (H)    History of sarcoma    S/P ORIF (open reduction internal fixation) fracture    Hip pain, left    Postural urinary incontinence    Personal history of urinary tract infection    OAB (overactive bladder)    Myalgia of pelvic floor    Lesion of bladder    S/P total hip arthroplasty    Acute pain of left knee    Closed fracture of left tibia and fibula, initial encounter    Closed displaced fracture of left patella, unspecified fracture morphology, initial encounter    Other specified injury of left quadriceps muscle, fascia and tendon, initial encounter    Elevated coronary artery calcium score=7/1/21     Arthrofibrosis of knee joint, left    Hypothyroidism, unspecified type    Malignant neoplasm of upper-outer quadrant of right breast in female, estrogen receptor positive (H)      Past Medical History:   Diagnosis Date    * * * SBE PROPHYLAXIS * * * 1998    Amox 500mg, take 4 tabs one hour prior to procedure.Takes this because of lymphedema secondary from leg surgey    Antiplatelet or antithrombotic long-term use     Arrhythmia     Central serous retinopathy 2001    Resolved 9/2001    CHRONIC NECK PAIN 1995    Depressive disorder     Depressive disorder, not elsewhere classified 2001    Elevated coronary artery calcium score=7/1/21 08/03/2021    Elevated coronary artery calcium score=7/1/21 08/03/2021    History of blood transfusion 04/2019 12/2020    during left hip pinning, during surgery for patella    Lateral epicondylitis     MIXED HYPERLIPIDEMIA, LDL GOAL <160 1998    LDL goal < 160    Motion sickness     MYXOID LIPOSARCOMA 2000    Left thigh, S/P excision, radiation  at [facility]    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    CHRONIC LEFT THIGH LYMPHEDEMA    Nontoxic  "multinodular goiter 2005    needs yearly US    Osteoporosis, unspecified 2001    Other chronic pain     fx ribs left     Other lymphedema 2000    left thigh, gets regular PT for this    Overactive bladder     PAD (peripheral artery disease) (H) 04/20/2018    PONV (postoperative nausea and vomiting)     SHINGLES 2001    Sprain of lumbosacral (joint) (ligament) 1995    right    Unspecified hearing loss 1998    chronic tinnitus    Unspecified tinnitus 1998        Objective      PHYSICAL EXAM   /74   Ht 1.664 m (5' 5.5\")   Wt 68.9 kg (152 lb)   LMP 03/18/2005   BMI 24.91 kg/m     Physical Exam  Constitutional:       Appearance: Normal appearance.   HENT:      Head: Normocephalic.   Eyes:      General: No scleral icterus.  Pulmonary:      Effort: Pulmonary effort is normal.   Musculoskeletal:      Left lower leg: Edema present.      Comments: Ambulates with a cane.   Skin:     Findings: No rash.   Neurological:      General: No focal deficit present.      Mental Status: She is alert and oriented to person, place, and time.   Psychiatric:         Mood and Affect: Mood normal.         Behavior: Behavior normal.         LABORATORY     Recent Labs   Lab Test 07/18/23  1324 05/09/23  1639   COLOR Yellow  --    APPEARANCE Clear  --    URINEGLC Negative  --    URINEBILI Negative  --    URINEKETONE Negative  --    SG 1.015  --    UBLD Negative  --    URINEPH 6.5  --    PROTEIN Negative  --    UROBILINOGEN 0.2  --    NITRITE Negative  --    LEUKEST Trace*  --    RBCU  --  2-5*   WBCU  --  0-5     TESTING    PVR: 72 mL    Assessment & Plan    1. OAB (overactive bladder)      I had the pleasure today of meeting with Ms. Jacobson to discuss her urinary symptoms.  She is still having urgency and frequency, but is doing relatively well. UA not convincing for infection, and she is emptying better with a PVR of 72 mL.    We discussed options such as continuing as she is, increasing her Vesicare, or possible posterior tibial " nerve stimulation.  After discussion with the patient, she would like to consider increasing Vesicare.    -Will have you plan on taking 2-5 mg Vesicare tablets.  After this home trial, contact us to let me know what dosage you would like refilled 5 mg versus 10 mg tablets.    -Next option for urinary symptoms would be posterior tibial nerve stimulation or possible Botox.  We discussed each of these.  Posterior tibial nerve stimulation is once a week for 12 weeks and is done in our [place] office.  Botox is injected into the bladder.  There is a risk of urinary retention.  Most people get about 6 months out of this.    -Follow up in 1 year for med check and post void residual.    Contact us in the interim with questions, concerns, or changes in symptomatology.    Signed by:       Nancy Chawla PA-C 7/18/2023 1:52 PM

## 2023-07-19 ENCOUNTER — TELEPHONE (OUTPATIENT)
Dept: CARDIOLOGY | Facility: CLINIC | Age: 73
End: 2023-07-19

## 2023-07-19 ENCOUNTER — OFFICE VISIT (OUTPATIENT)
Dept: INTERNAL MEDICINE | Facility: CLINIC | Age: 73
End: 2023-07-19
Payer: COMMERCIAL

## 2023-07-19 VITALS
SYSTOLIC BLOOD PRESSURE: 104 MMHG | WEIGHT: 157.3 LBS | BODY MASS INDEX: 25.28 KG/M2 | RESPIRATION RATE: 18 BRPM | HEART RATE: 84 BPM | OXYGEN SATURATION: 98 % | TEMPERATURE: 98.2 F | HEIGHT: 66 IN | DIASTOLIC BLOOD PRESSURE: 67 MMHG

## 2023-07-19 DIAGNOSIS — E78.5 HYPERLIPIDEMIA LDL GOAL <70: ICD-10-CM

## 2023-07-19 DIAGNOSIS — Z17.0 MALIGNANT NEOPLASM OF UPPER-OUTER QUADRANT OF RIGHT BREAST IN FEMALE, ESTROGEN RECEPTOR POSITIVE (H): ICD-10-CM

## 2023-07-19 DIAGNOSIS — Z00.00 ROUTINE GENERAL MEDICAL EXAMINATION AT A HEALTH CARE FACILITY: Primary | ICD-10-CM

## 2023-07-19 DIAGNOSIS — E21.3 HYPERPARATHYROIDISM (H): ICD-10-CM

## 2023-07-19 DIAGNOSIS — C50.411 MALIGNANT NEOPLASM OF UPPER-OUTER QUADRANT OF RIGHT BREAST IN FEMALE, ESTROGEN RECEPTOR POSITIVE (H): ICD-10-CM

## 2023-07-19 DIAGNOSIS — I73.9 PAD (PERIPHERAL ARTERY DISEASE) (H): ICD-10-CM

## 2023-07-19 DIAGNOSIS — R06.83 SNORING: ICD-10-CM

## 2023-07-19 DIAGNOSIS — R53.83 OTHER FATIGUE: ICD-10-CM

## 2023-07-19 DIAGNOSIS — C49.9 MYXOID LIPOSARCOMA (H): ICD-10-CM

## 2023-07-19 DIAGNOSIS — E03.9 HYPOTHYROIDISM, UNSPECIFIED TYPE: ICD-10-CM

## 2023-07-19 LAB
PTH-INTACT SERPL-MCNC: 78 PG/ML (ref 15–65)
VIT B12 SERPL-MCNC: 1378 PG/ML (ref 232–1245)

## 2023-07-19 PROCEDURE — 36415 COLL VENOUS BLD VENIPUNCTURE: CPT | Performed by: INTERNAL MEDICINE

## 2023-07-19 PROCEDURE — 82607 VITAMIN B-12: CPT | Performed by: INTERNAL MEDICINE

## 2023-07-19 PROCEDURE — G0439 PPPS, SUBSEQ VISIT: HCPCS | Performed by: INTERNAL MEDICINE

## 2023-07-19 PROCEDURE — 99214 OFFICE O/P EST MOD 30 MIN: CPT | Mod: 25 | Performed by: INTERNAL MEDICINE

## 2023-07-19 PROCEDURE — 82306 VITAMIN D 25 HYDROXY: CPT | Performed by: INTERNAL MEDICINE

## 2023-07-19 PROCEDURE — 83970 ASSAY OF PARATHORMONE: CPT | Performed by: INTERNAL MEDICINE

## 2023-07-19 ASSESSMENT — ENCOUNTER SYMPTOMS
HEARTBURN: 0
DIZZINESS: 0
JOINT SWELLING: 0
PALPITATIONS: 0
BREAST MASS: 0
MYALGIAS: 0
NAUSEA: 0
CONSTIPATION: 0
SORE THROAT: 0
SHORTNESS OF BREATH: 0
DIARRHEA: 0
PARESTHESIAS: 1
ABDOMINAL PAIN: 0
FEVER: 0
ARTHRALGIAS: 0
WEAKNESS: 0
EYE PAIN: 0
DYSURIA: 0
HEMATURIA: 0
HEADACHES: 0
NERVOUS/ANXIOUS: 0
CHILLS: 0
HEMATOCHEZIA: 0
COUGH: 0
FREQUENCY: 1

## 2023-07-19 ASSESSMENT — PATIENT HEALTH QUESTIONNAIRE - PHQ9
SUM OF ALL RESPONSES TO PHQ QUESTIONS 1-9: 0
SUM OF ALL RESPONSES TO PHQ QUESTIONS 1-9: 0
10. IF YOU CHECKED OFF ANY PROBLEMS, HOW DIFFICULT HAVE THESE PROBLEMS MADE IT FOR YOU TO DO YOUR WORK, TAKE CARE OF THINGS AT HOME, OR GET ALONG WITH OTHER PEOPLE: NOT DIFFICULT AT ALL

## 2023-07-19 ASSESSMENT — ACTIVITIES OF DAILY LIVING (ADL): CURRENT_FUNCTION: NO ASSISTANCE NEEDED

## 2023-07-19 ASSESSMENT — PAIN SCALES - GENERAL: PAINLEVEL: NO PAIN (0)

## 2023-07-19 NOTE — PROGRESS NOTES
Dr Way's note    Patient's instructions / PLAN:                                                        Plan:  1. Sleep ctr referral   2.  Labs today - [address]   3. No changes in meds   4. 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  She had multiple surgeries including femoropopliteal bypass and graft (because she has history of occlusion, she is on treatment with Plavix and Xarelto).  After the surgery she feels her left leg is generalized weak and it affects her balance.  She fell at least couple of times with fracture of the left hip and left patella.  She also has left leg lymphedema for which she goes for lymphedema treatment and she has compression socks to the groin.  In December 2022 she was diagnosed with right breast cancer treated with lumpectomy  3 months ago she was diagnosed with hyperparathyroidism, with PTH at 100  She has osteoporosis IV Prolia treatment on her medication list.  She has CAD, based on high calcium score CT  She has hyperlipidemia for which she needs rosuvastatin 20 mg along with Zetia 10 mg and along with Repatha.  Hyperlipidemia is controlled based on recent numbers  -Complains that she snores, along with her complaint of being fatigued, I advised her to see the sleep center  The hypothyroidism is controlled based on the recent numbers      (Z00.00) Routine general medical examination at a health care facility  (primary encounter diagnosis)  Comment:   Plan:     (R06.83) Snoring  Comment:   Plan: Adult Sleep Eval & Management          Referral            (E21.3) Hyperparathyroidism (H)  Comment:   Plan: Parathyroid Hormone Intact, Vitamin D         Deficiency            (R53.83) Other fatigue  Comment:   Plan: Vitamin B12            (I73.9) PAD (peripheral artery disease)  (H)  Comment:   Plan:     (C50.411,  Z17.0) Malignant neoplasm of upper-outer quadrant of right breast in female, estrogen receptor positive (H)  Comment:   Plan:     (E03.9) Hypothyroidism, unspecified type  Comment:   Plan:     (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  We will try to do as much is here today.      SUBJECTIVE:                                                    History of present illness     We reviewed the chronic medical problems as above.   I reviewed the recent tests results in Epic       Hyperparathyroidism  --    -- parathyroid scan done last   -- she takes vit d she takes 600 units daily.     Sarcoma L thigh   -- surgery 2000  -- sec lymphedema, instability of the left leg      Knee  -- at least 2 surgeries  -- affects the stability    -- She would like me to check the medial aspect of the left knee, she thinks there is a fibrous tissue there. -- surg scar is well healed.  I explained I have no expertise in ortho and she should follow-up with Ortho doctor for this issue      R breast Ca  -- Dec 2022    Ears plugged when descending   -- I advised her not to take Sudafed because of the history of CAD.  She can use 1 time nasal decongestant spray just before the plane is descending    ROS:     See below          PMHx: - reviewed  Past Medical History:   Diagnosis Date     * * * SBE PROPHYLAXIS * * * 1998    Amox 500mg, take 4 tabs one hour prior to procedure.Takes this because of lymphedema secondary from leg surgey     Antiplatelet or antithrombotic long-term use      Arrhythmia      Central serous retinopathy 2001    Resolved 9/2001     CHRONIC NECK PAIN 1995     Depressive disorder      Depressive disorder, not elsewhere classified 2001     Elevated coronary artery calcium score=7/1/21   08/03/2021     Elevated coronary artery calcium score=7/1/21 08/03/2021     History of blood transfusion 04/2019 12/2020    during left hip pinning, during surgery for patella     Lateral epicondylitis      MIXED HYPERLIPIDEMIA, LDL GOAL <160 1998    LDL goal < 160     Motion sickness      MYXOID LIPOSARCOMA 2000    Left thigh, S/P excision, radiation  at [facility]     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    CHRONIC LEFT THIGH LYMPHEDEMA     Nontoxic multinodular goiter 2005    needs yearly US     Osteoporosis, unspecified 2001     Other chronic pain     fx ribs left      Other lymphedema 2000    left thigh, gets regular PT for this     Overactive bladder      PAD (peripheral artery disease) (H) 04/20/2018     PONV (postoperative nausea and vomiting)      SHINGLES 2001     Sprain of lumbosacral (joint) (ligament) 1995    right     Unspecified hearing loss 1998    chronic tinnitus     Unspecified tinnitus 1998       PSHx: reviewed  Past Surgical History:   Procedure Laterality Date     ARTHROPLASTY HIP Left 10/4/2019    Procedure: Removal of left femoral hardware with a conversion to left total hip arthroplasty using a Biomet Annalisa femoral stem with an OsseoTi acetabular shell and a dual mobility bearing surface;  Surgeon: Spencer Celeste MD;  Location: [facility] OR     BIOPSY  April 2000     BIOPSY BREAST SEED LOCALIZATION Right 12/14/2022    Procedure: right breast tag localized lumpectomy;  Surgeon: Shelly Carr MD;  Location:  OR     BIOPSY NODE SENTINEL Right 12/14/2022    Procedure: with right sentinel lymph node biopsy;  Surgeon: Shelly Carr MD;  Location:  OR     BYPASS GRAFT FEMOROPOPLITEAL Left 1/21/2019    Procedure: LEFT FEMORAL TO ABOVE KNEE POPLITEAL  BYPASS WITH POLYTETRAFLUOROETHYLENE GRAFT;  Surgeon: Shade Owens MD;  Location:  OR     COLONOSCOPY N/A 2/5/2016    Procedure: COMBINED COLONOSCOPY, SINGLE OR MULTIPLE BIOPSY/POLYPECTOMY BY BIOPSY;  Surgeon: Varun Stanley MD, MD;   Location:  GI     COLONOSCOPY N/A 12/10/2021    Procedure: COLONOSCOPY, WITH POLYPECTOMIES  USING COLD  SNARE,   CLIP APPLIED X2;  Surgeon: Dayton Luna MD;  Location:  GI     CYSTOSCOPY       EXCISION MALIG LESION>1.25CM  5/2000    Myxoid Liposarcoma       EXPLORE GROIN Right 5/1/2018    Procedure: EXPLORE GROIN;  EMERGENCY RIGHT FEMORAL EXPLORATION WITH FEMORAL ARTERY REPAIR.    EBL: 50mL;  Surgeon: Shade Owens MD;  Location: [facility] OR     HC COLP CERVIX/UPPER VAGINA  07/1997    Negative     HC DILATION/CURETTAGE DIAG/THER NON OB  02/1997    Post menopausal bleeding on HRT, negative     HIP SURGERY Left 04/13/2019     IR ANGIOGRAM THROUGH CATHETER FOLLOW UP  12/20/2018     IR ANGIOGRAM THROUGH CATHETER FOLLOW UP  12/21/2018     IR LOWER EXTREMITY ANGIOGRAM LEFT  12/19/2018     OPEN REDUCTION INTERNAL FIXATION PATELLA Left 12/21/2020    Procedure: Left partial patella fracture excision with advancement of the quadriceps tendon;  Surgeon: Stephen Rhodes MD;  Location:  OR     OPEN REDUCTION INTERNAL FIXATION RODDING INTRAMEDULLARY TIBIA Left 12/21/2020    Procedure: Open reduction intramedullary nailing of left tibia fracture;  Surgeon: Stephen Rhodes MD;  Location: [facility] OR     REMOVE HARDWARE KNEE Left 5/31/2022    Procedure: Left knee patella hardware removal;  Surgeon: Spencer Celeste MD;  Location:  OR     REPAIR TENDON QUADRICEPS Left 7/28/2021    Procedure: Left knee quadricepsplasty with intraoperative patellar fracture requiring open reduction and internal fixation of the patella;  Surgeon: Spencer Celeste MD;  Location:  OR     REPAIR TENDON QUADRICEPS Left 5/31/2022    Procedure: Open left knee VY quadricepsplasty with hardware removal and allograft;  Surgeon: Spencer Celeste MD;  Location:  OR     VASCULAR SURGERY  04/30/2018    Left SFA stent in bypass graft     ZZC APPENDECTOMY      Appendectomy     ZZC NONSPECIFIC PROCEDURE  04/2000    Open  Biopsy Left Thigh Liposarcoma     ZZHC COLONOSCOPY THRU STOMA, DIAGNOSTIC  2006    due 2010        Soc Hx: No daily alcohol, no smoking  Social History     Socioeconomic History     Marital status:      Spouse name: Chris     Number of children: 3     Years of education: 14     Highest education level: Associate degree: academic program   Occupational History     Occupation: at home     Employer: NONE    Tobacco Use     Smoking status: Never     Passive exposure: Never     Smokeless tobacco: Never   Vaping Use     Vaping Use: Never used   Substance and Sexual Activity     Alcohol use: Never     Drug use: Never     Sexual activity: Yes     Partners: Male     Birth control/protection: Male Surgical     Comment:  had a vasectomy   Other Topics Concern     Parent/sibling w/ CABG, MI or angioplasty before 65F 55M? No   Social History Narrative     Not on file     Social Determinants of Health     Financial Resource Strain: Low Risk  (12/16/2021)    Overall Financial Resource Strain (CARDIA)      Difficulty of Paying Living Expenses: Not hard at all   Food Insecurity: No Food Insecurity (12/16/2021)    Hunger Vital Sign      Worried About Running Out of Food in the Last Year: Never true      Ran Out of Food in the Last Year: Never true   Transportation Needs: No Transportation Needs (12/16/2021)    PRAPARE - Transportation      Lack of Transportation (Medical): No      Lack of Transportation (Non-Medical): No   Physical Activity: Inactive (12/16/2021)    Exercise Vital Sign      Days of Exercise per Week: 0 days      Minutes of Exercise per Session: 0 min   Stress: Stress Concern Present (12/16/2021)    [place] [employer] - Occupational Stress Questionnaire      Feeling of Stress : To some extent   Social Connections: Moderately Integrated (12/16/2021)    Social Connection and Isolation Panel [NHANES]      Frequency of Communication with Friends and Family: Never      Frequency of  Social Gatherings with Friends and Family: Never      Attends [employer] Services: More than 4 times per year      Active Member of Clubs or Organizations: Yes      Attends Club or Organization Meetings: Not on file      Marital Status:    Intimate Partner Violence: Not on file   Housing Stability: Low Risk  (2021)    Housing Stability Vital Sign      Unable to Pay for Housing in the Last Year: No      Number of Places Lived in the Last Year: 1      Unstable Housing in the Last Year: No        Fam Hx: reviewed  Family History   Problem Relation Age of Onset     C.A.D. Father         MI 57     Alcohol/Drug Father         etoh     Coronary Artery Disease Father      Obesity Mother      Osteoporosis Mother      Colon Cancer Brother      Hyperlipidemia Son      Anxiety Disorder Son      Hyperlipidemia Son         very high, experimental drug     C.A.D. Paternal Grandmother         ascvd     Diabetes Maternal Grandmother      Cancer Maternal Grandmother      C.A.D. Paternal Uncle         Mi  age 48     Cancer Maternal Aunt         pancreatic CA     Hyperlipidemia Son      Hyperlipidemia Cousin          Screening: reviewed      All: reviewed    Meds: reviewed  Current Outpatient Medications   Medication Sig Dispense Refill     calcium carbonate 600 mg-vitamin D 400 units (CALTRATE) 600-400 MG-UNIT per tablet Take 1 chew tab by mouth daily       clopidogrel (PLAVIX) 75 MG tablet Take 1 tablet (75 mg) by mouth daily 90 tablet 3     denosumab (PROLIA) 60 MG/ML SOSY injection        evolocumab (REPATHA) 140 MG/ML prefilled autoinjector Inject 1 mL (140 mg) Subcutaneous every 14 days 6 mL 3     ezetimibe (ZETIA) 10 MG tablet Take 1 tablet (10 mg) by mouth daily 90 tablet 3     letrozole (FEMARA) 2.5 MG tablet Take 1 tablet (2.5 mg) by mouth daily 90 tablet 3     levothyroxine (SYNTHROID/LEVOTHROID) 75 MCG tablet Take 1 tablet (75 mcg) by mouth daily 90 tablet 3     multivitamin, therapeutic (THERA-VIT) TABS  "tablet Take 1 tablet by mouth daily       nitroFURantoin macrocrystal-monohydrate (MACROBID) 100 MG capsule TAKE 1 CAPSULE BY MOUTH AFTER INTRCOURSE 30 capsule 3     rivaroxaban ANTICOAGULANT (XARELTO) 2.5 MG TABS tablet Take 1 tablet (2.5 mg) by mouth 2 times daily 180 tablet 1     rosuvastatin (CRESTOR) 20 MG tablet Take 1 tablet (20 mg) by mouth daily 90 tablet 3     solifenacin (VESICARE) 5 MG tablet Take 1 tablet (5 mg) by mouth daily 90 tablet 3       OBJECTIVE:                                                    Physical Exam :  Blood pressure 104/67, pulse 84, temperature 98.2  F (36.8  C), temperature source Tympanic, resp. rate 18, height 1.664 m (5' 5.5\"), weight 71.4 kg (157 lb 4.8 oz), SpO2 98 %, not currently breastfeeding.     NAD, appears comfortable  Skin clear, no rashes  HEENT: PERRLA, EOMI, anicteric sclera, pink conjunctiva, external ears appear normal, bilateral tympanic membranes clinically normal, oropharynx normal color.   Neck: supple, no JVD,  no thyroidmegaly  Lymph nodes non palpable in the cervical, supraclavicular axillaries,   Chest: clear to auscultation with good respiratory effort  Cardiac: S1S2, RRR, no mgr appreciated  Abdomen: soft, not tender, not distended, audible bowel sound, no hepatosplenomegaly, no palpable masses, no abdominal bruits  Extremities: no cyanosis, clubbing or edema.   Neuro: A, Ox3, no focal signs.  Breast exam deferred she had breast exam with oncology    Pelvic exam: deferred, s/p menopause, no symptoms, no hx of abnormal pap         Edel Way MD  Internal Medicine       SUBJECTIVE:   Angeli is a 73 year old who presents for Preventive Visit.      07/19/2023    3:21 PM   Additional Questions   Roomed by Val Agustin   Accompanied by SHERRY     Are you in the first 12 months of your Medicare coverage?  No    Healthy Habits:     In general, how would you rate your overall health?  Fair    Frequency of exercise:  2-3 days/week    Do you usually eat at " "least 4 servings of fruit and vegetables a day, include whole grains    & fiber and avoid regularly eating high fat or \"junk\" foods?  Yes    Taking medications regularly:  Yes    Ability to successfully perform activities of daily living:  No assistance needed    Home Safety:  No safety concerns identified    Hearing Impairment:  Difficulty following a conversation in a noisy restaurant or crowded room, need to ask people to speak up or repeat themselves and difficulty understanding soft or whispered speech    In the past 6 months, have you been bothered by leaking of urine?  No    In general, how would you rate your overall mental or emotional health?  Fair    Additional concerns today:  Yes        Have you ever done Advance Care Planning? (For example, a Health Directive, POLST, or a discussion with a medical provider or your loved ones about your wishes): Yes, advance care planning is on file.       Fall risk  Fallen 2 or more times in the past year?: No  Any fall with injury in the past year?: No    Cognitive Screening   1) Repeat 3 items (Leader, Season, Table)    2) Clock draw: NORMAL  3) 3 item recall: Recalls 3 objects  Results: 3 items recalled: COGNITIVE IMPAIRMENT LESS LIKELY    Mini-CogTM Copyright YELENA Tapia. Licensed by the author for use in [facility]; reprinted with permission (caridad@[facility]). All rights reserved.      Do you have sleep apnea, excessive snoring or daytime drowsiness?: Patient states shanna  told her she has labored breathing while sleeping.    Reviewed and updated as needed this visit by clinical staff                  Reviewed and updated as needed this visit by Provider                 Social History     Tobacco Use     Smoking status: Never     Passive exposure: Never     Smokeless tobacco: Never   Substance Use Topics     Alcohol use: Never             7/12/2023     4:43 PM   Alcohol Use   Prescreen: >3 drinks/day or >7 drinks/week? Not Applicable     Do you have a " current opioid prescription? No  Do you use any other controlled substances or medications that are not prescribed by a provider? None          Hyperlipidemia Follow-Up      Are you regularly taking any medication or supplement to lower your cholesterol?   Yes- rosuvastatin    Are you having muscle aches or other side effects that you think could be caused by your cholesterol lowering medication?  No      Current providers sharing in care for this patient include:   Patient Care Team:  Sepideh Burris PA-C as PCP - General (Physician Assistant)  Sepideh Burris PA-C as Assigned PCP  Verenice [facility] as Pharmacist (Pharmacist Ambulatory Care)  Tiesha Prince MD as Assigned Heart and Vascular Provider  Irena Gilliam PA-C as Physician Assistant (Orthopaedic Surgery)  Leeann Sharma MD as Assigned Neuroscience Provider  Nancy Chawla PA-C as Physician Assistant (Urology)  Muriel Orosco PA-C as Physician Assistant (Family Medicine)  Emilia Angulo MD as MD (Hematology & Oncology)  Shelly Carr MD as Assigned Surgical Provider  Emilia Angulo MD as Assigned Cancer Care Provider  Spencer Wilder MD as MD (Cardiovascular Disease)  Elías Bridges MD as Physician (Physical Medicine and Rehabilitation)    The following health maintenance items are reviewed in Epic and correct as of today:  Health Maintenance   Topic Date Due     DIABETIC FOOT EXAM  Never done     COVID-19 Vaccine (6 - Moderna series) 03/17/2023     MEDICARE ANNUAL WELLNESS VISIT  05/26/2023     ANNUAL REVIEW OF HM ORDERS  05/26/2023     A1C  09/01/2023     INFLUENZA VACCINE (1) 09/01/2023     PHQ-9  11/09/2023     BMP  03/24/2024     TSH W/FREE T4 REFLEX  03/24/2024     EYE EXAM  04/24/2024     MAMMO SCREENING  05/16/2024     LIPID  06/01/2024     MICROALBUMIN  06/01/2024     FALL RISK ASSESSMENT  07/19/2024     DEXA  12/22/2024     ADVANCE CARE PLANNING  09/09/2025     COLORECTAL  CANCER SCREENING  12/10/2026     DTAP/TDAP/TD IMMUNIZATION (3 - Td or Tdap) 08/28/2028     HEPATITIS C SCREENING  Completed     DEPRESSION ACTION PLAN  Completed     Pneumococcal Vaccine: 65+ Years  Completed     ZOSTER IMMUNIZATION  Completed     IPV IMMUNIZATION  Aged Out     MENINGITIS IMMUNIZATION  Aged Out     URINE DRUG SCREEN  Discontinued     Labs reviewed in EPIC          Review of Systems   Constitutional: Negative for chills and fever.   HENT: Positive for hearing loss. Negative for congestion, ear pain and sore throat.    Eyes: Negative for pain and visual disturbance.   Respiratory: Negative for cough and shortness of breath.    Cardiovascular: Positive for peripheral edema. Negative for chest pain and palpitations.   Gastrointestinal: Negative for abdominal pain, constipation, diarrhea, heartburn, hematochezia and nausea.   Breasts:  Positive for tenderness. Negative for breast mass and discharge.   Genitourinary: Positive for frequency and urgency. Negative for dysuria, genital sores, hematuria, pelvic pain, vaginal bleeding and vaginal discharge.   Musculoskeletal: Negative for arthralgias, joint swelling and myalgias.   Skin: Negative for rash.   Neurological: Positive for paresthesias. Negative for dizziness, weakness and headaches.   Psychiatric/Behavioral: Negative for mood changes. The patient is not nervous/anxious.          Patient has been advised of split billing requirements and indicates understanding: Yes  At the check in, at the      COUNSELING:  Reviewed preventive health counseling, as reflected in patient instructions       Regular exercise       Healthy diet/nutrition        She reports that she has never smoked. She has never been exposed to tobacco smoke. She has never used smokeless tobacco.      Appropriate preventive services were discussed with this patient, including applicable screening as appropriate for cardiovascular disease, diabetes, osteopenia/osteoporosis,  and glaucoma.  As appropriate for age/gender, discussed screening for colorectal cancer, prostate cancer, breast cancer, and cervical cancer. Checklist reviewing preventive services available has been given to the patient.    Reviewed patients plan of care and provided an AVS. The Basic Care Plan (routine screening as documented in Health Maintenance) for Angeli meets the Care Plan requirement. This Care Plan has been established and reviewed with the Patient.          Edel Mccarty MD  [facility]    Identified Health Risks:    I have reviewed Opioid Use Disorder and Substance Use Disorder risk factors and made any needed referrals.     Answers for HPI/ROS submitted by the patient on 7/19/2023  If you checked off any problems, how difficult have these problems made it for you to do your work, take care of things at home, or get along with other people?: Not difficult at all  PHQ9 TOTAL SCORE: 0

## 2023-07-19 NOTE — TELEPHONE ENCOUNTER
Health Call Center    Phone Message    May a detailed message be left on voicemail: yes     Reason for Call: Medication Refill Request    Has the patient contacted the pharmacy for the refill? Yes   Name of medication being requested: evolocumab (REPATHA) 140 MG/ML prefilled autoinjector  Provider who prescribed the medication: Tina  Pharmacy:    [facility]     Date medication is needed: Now. Patient would like ASAP. Patient is leaving for a trip next wednsday.    Action Taken: Other: Cardiology     Travel Screening: Not Applicable     Thank you!  [facility]

## 2023-07-19 NOTE — TELEPHONE ENCOUNTER
Central Prior Authorization Team   Phone: [phone]    PA Initiation    Medication: Repatha 140mg/ml  Insurance Company: [employer] - Phone [phone] Fax [phone]  Pharmacy Filling the Rx: [facility] - [place], [place] - [address]  Filling Pharmacy Phone: [phone]  Filling Pharmacy Fax:    Start Date: 7/19/2023    Finished through [employer]

## 2023-07-19 NOTE — PROGRESS NOTES
"  {PROVIDER CHARTING PREFERENCE:828634}    Carmel Suh is a 73 year old, presenting for the following health issues:  No chief complaint on file.        5/9/2023     3:21 PM   Additional Questions   Roomed by Digna Montalvo   Accompanied by SHERRY     Healthy Habits:     In general, how would you rate your overall health?  Fair    Frequency of exercise:  2-3 days/week    Do you usually eat at least 4 servings of fruit and vegetables a day, include whole grains    & fiber and avoid regularly eating high fat or \"junk\" foods?  Yes    Taking medications regularly:  Yes    Ability to successfully perform activities of daily living:  No assistance needed    Home Safety:  No safety concerns identified    Hearing Impairment:  Difficulty following a conversation in a noisy restaurant or crowded room, need to ask people to speak up or repeat themselves and difficulty understanding soft or whispered speech    In the past 6 months, have you been bothered by leaking of urine?  No    In general, how would you rate your overall mental or emotional health?  Fair    Additional concerns today:  Yes       {SUPERLIST (Optional):821822}  {additonal problems for provider to add (Optional):130324}      Review of Systems   Constitutional: Negative for chills and fever.   HENT: Positive for hearing loss. Negative for congestion, ear pain and sore throat.    Eyes: Negative for pain and visual disturbance.   Respiratory: Negative for cough and shortness of breath.    Cardiovascular: Positive for peripheral edema. Negative for chest pain and palpitations.   Gastrointestinal: Negative for abdominal pain, constipation, diarrhea, heartburn, hematochezia and nausea.   Breasts:  Positive for tenderness. Negative for breast mass and discharge.   Genitourinary: Positive for frequency and urgency. Negative for dysuria, genital sores, hematuria, pelvic pain, vaginal bleeding and vaginal discharge.   Musculoskeletal: Negative for arthralgias, " joint swelling and myalgias.   Skin: Negative for rash.   Neurological: Positive for paresthesias. Negative for dizziness, weakness and headaches.   Psychiatric/Behavioral: Negative for mood changes. The patient is not nervous/anxious.       {ROS COMP (Optional):991360}      Objective    LMP 03/18/2005   There is no height or weight on file to calculate BMI.  Physical Exam   {Exam List (Optional):629213}    {Diagnostic Test Results (Optional):026987}    {AMBULATORY ATTESTATION (Optional):213410}

## 2023-07-19 NOTE — PATIENT INSTRUCTIONS
Plan:  1. Sleep ctr referral   2.  Labs today - [address]   3. No changes in meds   4. Schedule in office follow up appointment Aug - Sept

## 2023-07-20 LAB — DEPRECATED CALCIDIOL+CALCIFEROL SERPL-MC: 42 UG/L (ref 20–75)

## 2023-07-20 NOTE — TELEPHONE ENCOUNTER
Prior Authorization Approval    Authorization Effective Date: 4/20/2003  Authorization Expiration Date: 7/19/2024  Medication: Repatha 140mg/ml  Approved Dose/Quantity:   Reference #:     Insurance Company: [employer] - Phone [phone] Fax [phone]  Expected CoPay:       CoPay Card Available:      Foundation Assistance Needed:    Which Pharmacy is filling the prescription (Not needed for infusion/clinic administered): [facility] - [place], [place] - [address]  Pharmacy Notified: Yes  Patient Notified: Yes

## 2023-07-21 ENCOUNTER — MYC MEDICAL ADVICE (OUTPATIENT)
Dept: INTERNAL MEDICINE | Facility: CLINIC | Age: 73
End: 2023-07-21
Payer: COMMERCIAL

## 2023-07-21 ENCOUNTER — TELEPHONE (OUTPATIENT)
Dept: PHYSICAL MEDICINE AND REHAB | Facility: CLINIC | Age: 73
End: 2023-07-21
Payer: COMMERCIAL

## 2023-07-21 NOTE — TELEPHONE ENCOUNTER
Health Call Center    Phone Message    May a detailed message be left on voicemail: yes     Reason for Call: Other: Patient previously saw Dr. Bridges, and is wondering how to go over her parathyroid US test results from 7/13/2023 if her ordering provider is now gone. Please call patient to discuss.    Action Taken: Message routed to:  [facility] PM&R    Travel Screening: Not Applicable

## 2023-07-24 NOTE — TELEPHONE ENCOUNTER
Dr Mccarty,    You have seen Angeli since her PTH level was elevated, her next appt with you is on 9/6.    Dr Bridges (PM & R) had ordered an US of parathyroid at her last visit with Angeli on 5/18/23 -  I cannot explain why the US was booked two months out and done just two weeks ago on 7/13 - nevertheless, Dr Bridges has unexpectedly taken correction at the end of May and cannot f/u on this US report.    Please review the Parathyroid US and make recommendations to Angeli. I have given instructions to our scheduling staff to let her know that the time it would take to see another PM &R provider would only delay any action needed concerning the US findings.    We are happy to book Angeli with Dr Mccord for further bone health recommendations, if you agree, for osteoporosis Rx if needed - as well as with a General PM &R provider for her lower extremity muscle needs to improve transfers and ambulation, general deconditioning and shoe lift for LLD (Dr Mccord does not see General PM &R and does video visits only, so it could take 2 separate NEW visits in PM &R to continue to address)    My review of Angeli's health needs is that the US report takes precedence over waiting several weeks to see 2 NEW PM &R providers.    Thank you for your f/u with Angeli regarding the parathyroid US done on 7/13/23.    Please contact me if there are questions.    Patti Duran RN, CRRN  Patient Care Coordinator  Department of Physical Medicine and Rehabilitation  [facility]  [phone]      Routed to [facility] [place] & Dr Deanna Duran, RN on 7/24/2023 at 3:04 PM

## 2023-07-24 NOTE — TELEPHONE ENCOUNTER
Parathyroid Ultra sound requested in May was performed on 7/13/23 and a repeat of parathyroid hormone test collected on 7/19/23 was requested by PCP who has reviewed and responded to Angeli as copied below:       The parathyroid hormone result is little better than before.  We will discuss results at the next appointment September 6   Written by Edel Mccarty MD on 7/20/2023  6:36 PM CDT  Seen by patient Angeli Jacobson on 7/21/2023 12:30 PM      The next scheduled appointment with PCP is 9/6/23.    Last visit with Dr Bridges was on 5/18/23, then Dr Bridges retired at the end of that month so there no opportunity to rebook another follow up.    Dr Bridges's note of 5/18/23:    Provide instructions: Discussed adding to the present left that she is using in her left shoe.  Her orthopedic surgeon had recommended 1 inch discrepancy correction and today they had shoe lift of three fourths of an inch I suggested that she would apply the rest of it.     Future plans and other new needs:   #1 we will obtain ultrasound of the cervical soft tissues regarding presence of aberrant parathyroid gland tissues.  #2 we reviewed her bone mineral density, latest result at lumbar spine she is -2.5 in view of degenerative arthritis at the lumbar spine it could be less than -2.5 which places her at osteo porosis level her right hip BMD is slightly better and is at osteopenia level.  #3 spine x-rays were reviewed she has thoraco lumbar care 'lumbar hyperlordosis and thoracic hyperkyphosis.  #4 I asked  and Mrs. Jacobson that they would call me when the ultrasound of the neck result becomes available so we can discuss the next stage of management  by phone I was provided the cell phone number during this visit.  #5 Mrs. Jacobson have tightness of hamstrings on the left side during this visit we addressed gentle prolonged stretching of the hamstrings on the left.  She has difficulty getting in and out of sitting  position smoothly and uses her hands for getting out of the chair we addressed strengthening of gluteal muscles as needed and reviewed the proper exercise for implementation at home.    #6 to increase the left applied to the left shoe as was discussed above.     Elías Bridges MD    RN TRIAGE FOR F/U - Please give Angeli these options:    She will need to call her PCP for an earlier appt. If available to review the parathyroid Ultra sound done 7/13/23, or they may recommend next steps by phone (Item #4 above)  PCP may recommend to send her to a surgeon for the nodules / there is no reason to see another PM &R provider for this topic as the PCP is already involved in watching the parathyroid issue, and the treatment would not be dictated by PM &R. (i.e. Dr Bridges would likely have asked her to see PCP for this issue as well).  I will send this note to her PCP so Dr. Mccarty is aware since results did not go directly to her.    She can be booked as NEW for Bone Health in PM &R with Dr Mccord (video only) if she chooses to pursue bone health Tx, however it appears the PCP is also aware of osteoporosis (#2 above - could also be refrerred to Endocrine if PCP so chooses) however, the issue of the nodules should be addressed first.  Any NEW appointment with Dr Mccord would be in late September or October.    She can book an in person General PM &R appointment for next available with Reece Carpio or Antonio Bach if she chooses to come to [facility] for f/u on the limb length discrepancy and issues noted in # 5 above, as  earlier noted the PCP f/u should be priority to address what the US shows.    Patti Duran, RN on 7/24/2023 at 2:16 PM

## 2023-07-26 NOTE — TELEPHONE ENCOUNTER
The patient is new to me and has complex medical problems and history  The patient has a follow-up appointment on September 6  We will address her medical problems step-by-step    Thank you,    Edel Way MD  Internal Medicine

## 2023-08-22 ENCOUNTER — LAB (OUTPATIENT)
Dept: INFUSION THERAPY | Facility: CLINIC | Age: 73
End: 2023-08-22
Attending: INTERNAL MEDICINE
Payer: COMMERCIAL

## 2023-08-22 ENCOUNTER — VIRTUAL VISIT (OUTPATIENT)
Dept: FAMILY MEDICINE | Facility: CLINIC | Age: 73
End: 2023-08-22
Payer: COMMERCIAL

## 2023-08-22 VITALS
TEMPERATURE: 98.1 F | RESPIRATION RATE: 16 BRPM | SYSTOLIC BLOOD PRESSURE: 115 MMHG | DIASTOLIC BLOOD PRESSURE: 77 MMHG | HEART RATE: 74 BPM | OXYGEN SATURATION: 97 %

## 2023-08-22 DIAGNOSIS — J01.00 ACUTE NON-RECURRENT MAXILLARY SINUSITIS: Primary | ICD-10-CM

## 2023-08-22 DIAGNOSIS — M81.0 AGE-RELATED OSTEOPOROSIS WITHOUT CURRENT PATHOLOGICAL FRACTURE: Primary | ICD-10-CM

## 2023-08-22 LAB
CALCIUM SERPL-MCNC: 10 MG/DL (ref 8.8–10.2)
CREAT SERPL-MCNC: 0.69 MG/DL (ref 0.51–0.95)
GFR SERPL CREATININE-BSD FRML MDRD: >90 ML/MIN/1.73M2

## 2023-08-22 PROCEDURE — 82310 ASSAY OF CALCIUM: CPT | Performed by: INTERNAL MEDICINE

## 2023-08-22 PROCEDURE — 250N000011 HC RX IP 250 OP 636: Mod: JZ | Performed by: INTERNAL MEDICINE

## 2023-08-22 PROCEDURE — 96374 THER/PROPH/DIAG INJ IV PUSH: CPT

## 2023-08-22 PROCEDURE — 99213 OFFICE O/P EST LOW 20 MIN: CPT | Mod: VID | Performed by: PHYSICIAN ASSISTANT

## 2023-08-22 PROCEDURE — 96372 THER/PROPH/DIAG INJ SC/IM: CPT | Performed by: INTERNAL MEDICINE

## 2023-08-22 PROCEDURE — 36415 COLL VENOUS BLD VENIPUNCTURE: CPT

## 2023-08-22 PROCEDURE — 82565 ASSAY OF CREATININE: CPT | Performed by: INTERNAL MEDICINE

## 2023-08-22 RX ADMIN — DENOSUMAB 60 MG: 60 INJECTION SUBCUTANEOUS at 12:10

## 2023-08-22 ASSESSMENT — PAIN SCALES - GENERAL: PAINLEVEL: NO PAIN (0)

## 2023-08-22 NOTE — PROGRESS NOTES
"Angeli is a 73 year old who is being evaluated via a billable video visit.      How would you like to obtain your AVS? MyChart  If the video visit is dropped, the invitation should be resent by: Text to cell phone: [phone]  Will anyone else be joining your video visit? No          Assessment & Plan     Acute non-recurrent maxillary sinusitis  Supportive cares.   - amoxicillin-clavulanate (AUGMENTIN) 875-125 MG tablet; Take 1 tablet by mouth 2 times daily for 10 days   BMI:   Estimated body mass index is 25.78 kg/m  as calculated from the following:    Height as of 7/19/23: 1.664 m (5' 5.5\").    Weight as of 7/19/23: 71.4 kg (157 lb 4.8 oz).           Sepideh Burris PA-C  [facility]    Carmel Suh is a 73 year old, presenting for the following health issues:  URI (Congestion, plugged ears, drainage, cough x3 weeks/)        8/22/2023     3:08 PM   Additional Questions   Roomed by Katherine GONG CMA       URI    History of Present Illness       Reason for visit:  Lingering cold symptoms- began August 2nd; fatigue  Symptom onset:  3-4 weeks ago  Symptoms include:  Nasal draining; stuffed head; fatigue  Symptom intensity:  Moderate  Symptom progression:  Staying the same  Had these symptoms before:  Yes  Has tried/received treatment for these symptoms:  No  What makes it better:  Occasional taking tylenol 325 mg.    She eats 2-3 servings of fruits and vegetables daily.She consumes 0 sweetened beverage(s) daily.She exercises with enough effort to increase her heart rate 9 or less minutes per day.  She exercises with enough effort to increase her heart rate 3 or less days per week.   She is taking medications regularly.       Review of Systems   Constitutional, HEENT, cardiovascular, pulmonary, gi and gu systems are negative, except as otherwise noted.      Objective    Vitals - Patient Reported  Weight (Patient Reported): 68 kg (150 lb)  Height (Patient Reported): 166.4 cm (5' " "5.5\")  BMI (Based on Pt Reported Ht/Wt): 24.58        Physical Exam   GENERAL: Healthy, alert and no distress  EYES: Eyes grossly normal to inspection.  No discharge or erythema, or obvious scleral/conjunctival abnormalities.  RESP: No audible wheeze, cough, or visible cyanosis.  No visible retractions or increased work of breathing.    SKIN: Visible skin clear. No significant rash, abnormal pigmentation or lesions.  NEURO: Cranial nerves grossly intact.  Mentation and speech appropriate for age.  PSYCH: Mentation appears normal, affect normal/bright, judgement and insight intact, normal speech and appearance well-groomed.                Video-Visit Details    Type of service:  Video Visit   Video Start Time: 3:17 PM  Video End Time:3:33 PM    Originating Location (pt. Location): Home    Distant Location (provider location):  Off-site  Platform used for Video Visit: Jenni      "

## 2023-08-22 NOTE — PROGRESS NOTES
Infusion Nursing Note:  Angeli Jacobson presents today for prolia.    Patient seen by provider today: No   present during visit today: Not Applicable.    Note: Pt reports to having cold symptoms such as nasal congestion, mild coughing. Denies fevers or chills.       Intravenous Access:  No Intravenous access/labs at this visit.    Treatment Conditions:  Lab Results   Component Value Date     03/24/2023    POTASSIUM 4.3 03/24/2023    MAG 1.8 05/01/2018    CR 0.69 08/22/2023    LONG 10.0 08/22/2023    BILITOTAL 0.3 03/24/2023    ALBUMIN 4.4 03/24/2023    ALT 24 06/01/2023    AST 34 03/24/2023       Results reviewed, labs MET treatment parameters, ok to proceed with treatment.      Post Infusion Assessment:  Patient tolerated injection without incident.  Site patent and intact, free from redness, edema or discomfort.       Discharge Plan:   Discharge instructions reviewed with: Patient and Family.  Patient and/or family verbalized understanding of discharge instructions and all questions answered.  AVS to patient via [email or web address].  Patient will return in 6 months for next appointment.   Patient discharged in stable condition accompanied by: self and .  Departure Mode: Ambulatory.      Ronald Roger RN

## 2023-08-31 NOTE — TELEPHONE ENCOUNTER
154.94 Pt called back, stated that she has talked to her eye doctor and no longer needs any appt with Sepideh Burris.     Vale Hoff Patient Representative    Encounter closed  Franci Hanson RN, BSN  Message handled by Nurse Triage.

## 2023-09-06 ENCOUNTER — OFFICE VISIT (OUTPATIENT)
Dept: INTERNAL MEDICINE | Facility: CLINIC | Age: 73
End: 2023-09-06
Payer: COMMERCIAL

## 2023-09-06 VITALS
WEIGHT: 159.6 LBS | HEART RATE: 84 BPM | BODY MASS INDEX: 25.65 KG/M2 | SYSTOLIC BLOOD PRESSURE: 123 MMHG | RESPIRATION RATE: 18 BRPM | TEMPERATURE: 98.1 F | HEIGHT: 66 IN | DIASTOLIC BLOOD PRESSURE: 74 MMHG | OXYGEN SATURATION: 97 %

## 2023-09-06 DIAGNOSIS — R19.4 BOWEL HABIT CHANGES: ICD-10-CM

## 2023-09-06 DIAGNOSIS — C50.411 MALIGNANT NEOPLASM OF UPPER-OUTER QUADRANT OF RIGHT BREAST IN FEMALE, ESTROGEN RECEPTOR POSITIVE (H): Primary | ICD-10-CM

## 2023-09-06 DIAGNOSIS — E21.3 HYPERPARATHYROIDISM (H): ICD-10-CM

## 2023-09-06 DIAGNOSIS — C49.9 MYXOID LIPOSARCOMA (H): ICD-10-CM

## 2023-09-06 DIAGNOSIS — Z17.0 MALIGNANT NEOPLASM OF UPPER-OUTER QUADRANT OF RIGHT BREAST IN FEMALE, ESTROGEN RECEPTOR POSITIVE (H): Primary | ICD-10-CM

## 2023-09-06 DIAGNOSIS — B35.1 ONYCHOMYCOSIS: ICD-10-CM

## 2023-09-06 PROCEDURE — 99214 OFFICE O/P EST MOD 30 MIN: CPT | Performed by: INTERNAL MEDICINE

## 2023-09-06 ASSESSMENT — PAIN SCALES - GENERAL: PAINLEVEL: NO PAIN (0)

## 2023-09-06 NOTE — PROGRESS NOTES
Dr Way's note      Patient's instructions / PLAN:                                                        Plan:  Continue with Calcium + vit D that you are already taking and add vitamin D 1000 units daily or 2000 units every other day  2. Endocrinology referral   3. Try Metamucil twice a day and if the bowel problem persists make an appointment with the colorectal surgeon   4. Please make a lab appointment for non fasting labs in Jan - Feb to recheck PTH and vit D  5. Please make an appointment few days after the labs to discuss about the results.   6. The following vaccines are recommended for you. Please check with your insurance about coverage.  Some insurances cover better if you have these vaccines at the pharmacy:  -- flu  -- RSV  -- Covid   -- Pneumonia 20 -- optional         ASSESSMENT & PLAN:                                                      (E21.3) Hyperparathyroidism (H)  Comment: With possible 2 small parathyroid nodules.  Since the PTH is coming down, I think it is worth to try a higher dose of vitamin D.  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  Apparently nobody informed the patient about the results.  The patient and her  have detailed questions about the results.  We reviewed the ultrasound results, PTH, vitamin D together    BM changes  -- she feels she doesn't empty the rectum  -- she denies constipation  -- colonoscopy 2021 -- polyps     R great toenail  -- s/p surgeru   -- onycomycosis        Carmel Suh is a 73 year old, presenting for the following health issues:  Patient is being see for an follow up.      History of Present Illness       Reason for visit:  To review past conditions and update current care if needed.She consumes 0 sweetened beverage(s) daily.She exercises with enough effort to increase her heart rate 9 or less minutes per day.  She exercises with enough effort to increase her heart rate 3 or less days per week.   She is taking medications regularly.         Review of Systems:                                                      ROS: negative for fever, chills, cough, wheezes, chest pain, shortness of breath, vomiting, abdominal pain, leg swelling       OBJECTIVE:             Physical exam:  Blood pressure 123/74, pulse 84, temperature 98.1  F (36.7  C), temperature source Tympanic, resp. rate 18, height 1.664 m (5' 5.5\"), weight 72.4 kg (159 lb 9.6 oz), SpO2 97 %, not currently breastfeeding.     NAD, appears comfortable    Neurologic: A, Ox3, no focal signs appreciated    PMHx: reviewed  Past Medical History:   Diagnosis Date    * * * SBE PROPHYLAXIS * * * 1998    Amox 500mg, take 4 tabs one hour prior to procedure.Takes this because of lymphedema secondary from leg surgey    Antiplatelet or antithrombotic long-term use     Arrhythmia     Central serous retinopathy 2001    Resolved 9/2001    CHRONIC NECK PAIN 1995    Depressive disorder     Depressive disorder, not elsewhere classified 2001 "    Elevated coronary artery calcium score=7/1/21 08/03/2021    Elevated coronary artery calcium score=7/1/21 08/03/2021    History of blood transfusion 04/2019 12/2020    during left hip pinning, during surgery for patella    Lateral epicondylitis     MIXED HYPERLIPIDEMIA, LDL GOAL <160 1998    LDL goal < 160    Motion sickness     MYXOID LIPOSARCOMA 2000    Left thigh, S/P excision, radiation  at [facility]    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    CHRONIC LEFT THIGH LYMPHEDEMA    Nontoxic multinodular goiter 2005    needs yearly US    Osteoporosis, unspecified 2001    Other chronic pain     fx ribs left     Other lymphedema 2000    left thigh, gets regular PT for this    Overactive bladder     PAD (peripheral artery disease) (H) 04/20/2018    PONV (postoperative nausea and vomiting)     SHINGLES 2001    Sprain of lumbosacral (joint) (ligament) 1995    right    Unspecified hearing loss 1998    chronic tinnitus    Unspecified tinnitus 1998      PSHx: reviewed  Past Surgical History:   Procedure Laterality Date    ARTHROPLASTY HIP Left 10/4/2019    Procedure: Removal of left femoral hardware with a conversion to left total hip arthroplasty using a Biomet Annalisa femoral stem with an OsseoTi acetabular shell and a dual mobility bearing surface;  Surgeon: Spencer Celeste MD;  Location: [facility] OR    BIOPSY  April 2000    BIOPSY BREAST SEED LOCALIZATION Right 12/14/2022    Procedure: right breast tag localized lumpectomy;  Surgeon: Shelly Carr MD;  Location:  OR    BIOPSY NODE SENTINEL Right 12/14/2022    Procedure: with right sentinel lymph node biopsy;  Surgeon: Shelly Carr MD;  Location:  OR    BYPASS GRAFT FEMOROPOPLITEAL Left 1/21/2019    Procedure: LEFT FEMORAL TO ABOVE KNEE POPLITEAL  BYPASS WITH POLYTETRAFLUOROETHYLENE GRAFT;  Surgeon: Shade Owens MD;  Location:  OR    COLONOSCOPY N/A 2/5/2016    Procedure: COMBINED COLONOSCOPY, SINGLE OR MULTIPLE BIOPSY/POLYPECTOMY BY BIOPSY;   Surgeon: Varun Stanley MD, MD;  Location:  GI    COLONOSCOPY N/A 12/10/2021    Procedure: COLONOSCOPY, WITH POLYPECTOMIES  USING COLD  SNARE,   CLIP APPLIED X2;  Surgeon: Dayton Luna MD;  Location:  GI    CYSTOSCOPY      EXCISION MALIG LESION>1.25CM  5/2000    Myxoid Liposarcoma      EXPLORE GROIN Right 5/1/2018    Procedure: EXPLORE GROIN;  EMERGENCY RIGHT FEMORAL EXPLORATION WITH FEMORAL ARTERY REPAIR.    EBL: 50mL;  Surgeon: Shade Owens MD;  Location: [facility] OR    HC COLP CERVIX/UPPER VAGINA  07/1997    Negative    HC DILATION/CURETTAGE DIAG/THER NON OB  02/1997    Post menopausal bleeding on HRT, negative    HIP SURGERY Left 04/13/2019    IR ANGIOGRAM THROUGH CATHETER FOLLOW UP  12/20/2018    IR ANGIOGRAM THROUGH CATHETER FOLLOW UP  12/21/2018    IR LOWER EXTREMITY ANGIOGRAM LEFT  12/19/2018    OPEN REDUCTION INTERNAL FIXATION PATELLA Left 12/21/2020    Procedure: Left partial patella fracture excision with advancement of the quadriceps tendon;  Surgeon: Stephen Rhodes MD;  Location:  OR    OPEN REDUCTION INTERNAL FIXATION RODDING INTRAMEDULLARY TIBIA Left 12/21/2020    Procedure: Open reduction intramedullary nailing of left tibia fracture;  Surgeon: Stehpen Rhodes MD;  Location: [facility] OR    REMOVE HARDWARE KNEE Left 5/31/2022    Procedure: Left knee patella hardware removal;  Surgeon: Spencer Celeste MD;  Location:  OR    REPAIR TENDON QUADRICEPS Left 7/28/2021    Procedure: Left knee quadricepsplasty with intraoperative patellar fracture requiring open reduction and internal fixation of the patella;  Surgeon: Spencer Celeste MD;  Location:  OR    REPAIR TENDON QUADRICEPS Left 5/31/2022    Procedure: Open left knee VY quadricepsplasty with hardware removal and allograft;  Surgeon: Spencer Celeste MD;  Location:  OR    VASCULAR SURGERY  04/30/2018    Left SFA stent in bypass graft    ZZC APPENDECTOMY      Appendectomy    ZZC NONSPECIFIC PROCEDURE   04/2000    Open Biopsy Left Thigh Liposarcoma    [facility] COLONOSCOPY THRU STOMA, DIAGNOSTIC  2006    due 2010        Meds: reviewed  Current Outpatient Medications   Medication Sig Dispense Refill    calcium carbonate 600 mg-vitamin D 400 units (CALTRATE) 600-400 MG-UNIT per tablet Take 1 chew tab by mouth daily      clopidogrel (PLAVIX) 75 MG tablet Take 1 tablet (75 mg) by mouth daily 90 tablet 3    denosumab (PROLIA) 60 MG/ML SOSY injection       evolocumab (REPATHA) 140 MG/ML prefilled autoinjector Inject 1 mL (140 mg) Subcutaneous every 14 days 6 mL 3    ezetimibe (ZETIA) 10 MG tablet Take 1 tablet (10 mg) by mouth daily 90 tablet 3    levothyroxine (SYNTHROID/LEVOTHROID) 75 MCG tablet Take 1 tablet (75 mcg) by mouth daily 90 tablet 3    multivitamin, therapeutic (THERA-VIT) TABS tablet Take 1 tablet by mouth daily      nitroFURantoin macrocrystal-monohydrate (MACROBID) 100 MG capsule TAKE 1 CAPSULE BY MOUTH AFTER INTRCOURSE 30 capsule 3    rivaroxaban ANTICOAGULANT (XARELTO) 2.5 MG TABS tablet Take 1 tablet (2.5 mg) by mouth 2 times daily 180 tablet 1    rosuvastatin (CRESTOR) 20 MG tablet Take 1 tablet (20 mg) by mouth daily 90 tablet 3    solifenacin (VESICARE) 5 MG tablet Take 1 tablet (5 mg) by mouth daily 90 tablet 3       Soc Hx: reviewed  Fam Hx: reviewed      Chart documentation was completed, in part, with Youj[employer] voice-recognition software. Even though reviewed, some grammatical, spelling, and word errors may remain.      Edel Way MD  Internal Medicine

## 2023-09-06 NOTE — PATIENT INSTRUCTIONS
Plan:  Continue with Calcium + vit D that you are already taking and add vitamin D 1000 units daily or 2000 units every other day  2. Endocrinology referral   3. Try Metamucil twice a day and if the bowel problem persists make an appointment with the colorectal surgeon   4. Please make a lab appointment for non fasting labs in Jan - Feb to recheck PTH and vit D  5. Please make an appointment few days after the labs to discuss about the results.   6. The following vaccines are recommended for you. Please check with your insurance about coverage.  Some insurances cover better if you have these vaccines at the pharmacy:  -- flu  -- RSV  -- Covid   -- Pneumonia 20 -- optional

## 2023-09-08 ENCOUNTER — TELEPHONE (OUTPATIENT)
Dept: SURGERY | Facility: CLINIC | Age: 73
End: 2023-09-08

## 2023-09-08 NOTE — TELEPHONE ENCOUNTER
[facility] Call Center    Phone Message    May a detailed message be left on voicemail: yes     Reason for Call: Appointment Intake    Referring Provider Name: Edel Mccarty MD  Diagnosis and/or Symptoms: Bowel habit changes [R19.4]    Referral received for Bowel habit changes. No other notes provided on referral.  Please advice for proper scheduling. Thank you    Action Taken: Message routed to:  [facility] Colorectal    Travel Screening: Not Applicable

## 2023-09-11 NOTE — TELEPHONE ENCOUNTER
Diagnosis, Referred by & from: Incomplete Evacuation   Appt date: 2023   NOTES STATUS DETAILS   OFFICE NOTE from referring provider Internal [facility]:  23, 23 - PCC OV with Dr. Mccarty   OFFICE NOTE from other specialist Internal [facility]:  23 - URO OV with MAURI Copeland    [facility]:  23 - PCC OV with MAURI Casanova    [facility]:  23 - PCC OV with Osiris Davey NP    [facility]:  21 - URO OV with Dr. Oquendo   DISCHARGE SUMMARY from hospitals Internal [facility]:  20 - Admission with Dr. Sellers   DISCHARGE REPORT from the ER Care Everywhere / Internal [facility]:  21 - ED OV with Dr. Worley    [facility]:  18 - ED OV with MAURI Soriano   OPERATIVE REPORT N/A    MEDICATION LIST Internal    LABS     BIOPSIES/PATHOLOGY RELATED TO DIAGNOSIS Internal [facility]:  12/10/21 - Colon Biopsy (Case: [id number])  16 - Colon Biopsy (Case: )   DIAGNOSTIC PROCEDURES     COLONOSCOPY [facility]:  12/10/21 - Colonoscopy  16 - Colonoscopy   IMAGING (DISC & REPORT)      CT Received [facility]:  21 - CT Chest/Abd/Pelvis   XRAY [facility]:  22 - XR Pelvis  10/4/19 - XR Hip/Pelvis   ULTRASOUND  (ENDOANAL/ENDORECTAL) [facility]:  19 - US Renal     Records Requested  23    Facility  [facility]  Fax: [phone]   Outcome * 23 11:56 AM Faxed req to [facility] for imaging disc to be Fed'Exd to the clinic. - Elizabeth    * 10/2/23 7:03 AM Faxed 2nd urg req to [facility] for the imaging disc to be Fed'Exd to the clinic. - Elizabeth    * 23 11:29 AM Imaging disc received from [facility] and sent to [facility] to be uploaded into PACs. - Elizabeth    Trackin

## 2023-09-24 ENCOUNTER — MYC MEDICAL ADVICE (OUTPATIENT)
Dept: INTERNAL MEDICINE | Facility: CLINIC | Age: 73
End: 2023-09-24
Payer: COMMERCIAL

## 2023-09-25 ENCOUNTER — E-VISIT (OUTPATIENT)
Dept: INTERNAL MEDICINE | Facility: CLINIC | Age: 73
End: 2023-09-25
Payer: COMMERCIAL

## 2023-09-25 ENCOUNTER — MYC MEDICAL ADVICE (OUTPATIENT)
Dept: INTERNAL MEDICINE | Facility: CLINIC | Age: 73
End: 2023-09-25

## 2023-09-25 DIAGNOSIS — N30.90 BLADDER INFECTION: ICD-10-CM

## 2023-09-25 DIAGNOSIS — R30.0 DYSURIA: Primary | ICD-10-CM

## 2023-09-25 LAB
BACTERIA #/AREA URNS HPF: ABNORMAL /HPF
RBC #/AREA URNS AUTO: ABNORMAL /HPF
WBC #/AREA URNS AUTO: ABNORMAL /HPF

## 2023-09-25 PROCEDURE — 87186 SC STD MICRODIL/AGAR DIL: CPT | Performed by: INTERNAL MEDICINE

## 2023-09-25 PROCEDURE — 87086 URINE CULTURE/COLONY COUNT: CPT | Performed by: INTERNAL MEDICINE

## 2023-09-25 PROCEDURE — 87088 URINE BACTERIA CULTURE: CPT | Performed by: INTERNAL MEDICINE

## 2023-09-25 PROCEDURE — 99421 OL DIG E/M SVC 5-10 MIN: CPT | Performed by: INTERNAL MEDICINE

## 2023-09-25 PROCEDURE — 81015 MICROSCOPIC EXAM OF URINE: CPT | Performed by: INTERNAL MEDICINE

## 2023-09-25 RX ORDER — NITROFURANTOIN 25; 75 MG/1; MG/1
100 CAPSULE ORAL 2 TIMES DAILY
Qty: 10 CAPSULE | Refills: 0 | Status: SHIPPED | OUTPATIENT
Start: 2023-09-25 | End: 2023-09-30

## 2023-09-25 NOTE — TELEPHONE ENCOUNTER
Patient calling in regards to her myc message.  Advised and assisted patient in submitting an E visit for her Urinary symptoms.  Please look out for this E visit.  Thanks!

## 2023-09-25 NOTE — PATIENT INSTRUCTIONS
I am sorry you do not feel so well.  I  will send Macrobid antibiotic prescription to the pharmacy.  It would be better if you can leave a urine sample at the lab before you start taking the antibiotic, so we can follow-up on the urine culture and sensitivity.  I hope you feel better soon    Sincerely,    Edel Way MD  Internal Medicine

## 2023-09-26 NOTE — TELEPHONE ENCOUNTER
Patient sent a myc message.  Looks like she gave urine specimen.      Please review recent labs and provide recommendations.  Thanks!

## 2023-09-27 ENCOUNTER — TELEPHONE (OUTPATIENT)
Dept: INTERNAL MEDICINE | Facility: CLINIC | Age: 73
End: 2023-09-27
Payer: COMMERCIAL

## 2023-09-27 DIAGNOSIS — N30.90 BLADDER INFECTION: Primary | ICD-10-CM

## 2023-09-27 LAB — BACTERIA UR CULT: ABNORMAL

## 2023-09-27 RX ORDER — CIPROFLOXACIN 250 MG/1
250 TABLET, FILM COATED ORAL 2 TIMES DAILY
Qty: 14 TABLET | Refills: 0 | Status: SHIPPED | OUTPATIENT
Start: 2023-09-27 | End: 2023-10-11

## 2023-09-27 NOTE — TELEPHONE ENCOUNTER
Patient advised of MD message to stop Nitrofurantoin and start taking Cipro twice daily x7 days.  DOROTHY Hutchins R.N.

## 2023-09-27 NOTE — TELEPHONE ENCOUNTER
Duplicate encounter.  This was taken care of and rx was switched.  Patient aware. DOROTHY Hutchins R.N.

## 2023-09-27 NOTE — TELEPHONE ENCOUNTER
Please call the patient.  The recent urine culture shows that the nitrofurantoin antibiotic might not help much.  I recommend to stop nitrofurantoin antibiotic and to start Cipro 250 mg twice a day for a week  Rx sent

## 2023-09-27 NOTE — TELEPHONE ENCOUNTER
Pt had visit on 9/9/23 with new PCP - and is now scheduled for ENDOCRINE NEW visit on 12/13/23    (E21.3) Hyperparathyroidism (H)  Comment: With possible 2 small parathyroid nodules.  Since the PTH is coming down, I think it is worth to try a higher dose of vitamin D.  She needs to see endocrinology  Plan: Adult Endocrinology  Referral,         Parathyroid Hormone Intact, Vitamin D         Deficiency, Basic metabolic panel, Ionized         Calcium    Angeli also had order for Denosomab (Prolia) subcutaneous q 6 mos given in clinic setting  from Dr Angulo (oncology) on 1/5/2023 (due to Hx of osteoporosis validated on DEXA and new outpatient medication ordered: anastrozole)    SUMMARY:  Has f/u plan regarding 7/13/23 US parathyroid results  Has PCP monitoring future lab including vit D  Already started on Prolia earlier this year - does not need to see PM &R regarding bone health Rx (I. E.Dr Askew)  If Anglei wishes f/u for lower extremity muscle needs to improve transfers and ambulation, general deconditioning and shoe lift for LLD, she does not need a General PM &R provider since she does also see Dr Leeann Sharma who is PM &R for oncology rehabilitation, or could also be directly referred to Physical Therapy as needed.    Writer taking Angeli off monitoring for needs from Physical Medicine Department.    Patti Duran RN on 9/27/2023 at 2:40 PM

## 2023-09-28 ENCOUNTER — MYC MEDICAL ADVICE (OUTPATIENT)
Dept: SURGERY | Facility: CLINIC | Age: 73
End: 2023-09-28
Payer: COMMERCIAL

## 2023-09-28 DIAGNOSIS — E03.9 HYPOTHYROIDISM, UNSPECIFIED TYPE: ICD-10-CM

## 2023-09-28 DIAGNOSIS — E78.5 HYPERLIPIDEMIA LDL GOAL <70: Primary | ICD-10-CM

## 2023-09-28 DIAGNOSIS — I73.9 PAD (PERIPHERAL ARTERY DISEASE) (H): ICD-10-CM

## 2023-10-02 ENCOUNTER — ANCILLARY PROCEDURE (OUTPATIENT)
Dept: GENERAL RADIOLOGY | Facility: CLINIC | Age: 73
End: 2023-10-02
Attending: NURSE PRACTITIONER
Payer: COMMERCIAL

## 2023-10-02 ENCOUNTER — OFFICE VISIT (OUTPATIENT)
Dept: PEDIATRICS | Facility: CLINIC | Age: 73
End: 2023-10-02
Payer: COMMERCIAL

## 2023-10-02 VITALS
TEMPERATURE: 97.5 F | BODY MASS INDEX: 25.49 KG/M2 | HEART RATE: 68 BPM | WEIGHT: 158.6 LBS | HEIGHT: 66 IN | RESPIRATION RATE: 16 BRPM | OXYGEN SATURATION: 97 % | DIASTOLIC BLOOD PRESSURE: 73 MMHG | SYSTOLIC BLOOD PRESSURE: 119 MMHG

## 2023-10-02 DIAGNOSIS — M79.644 PAIN OF RIGHT THUMB: ICD-10-CM

## 2023-10-02 DIAGNOSIS — M79.644 PAIN OF RIGHT THUMB: Primary | ICD-10-CM

## 2023-10-02 PROCEDURE — 99213 OFFICE O/P EST LOW 20 MIN: CPT | Performed by: NURSE PRACTITIONER

## 2023-10-02 PROCEDURE — 73140 X-RAY EXAM OF FINGER(S): CPT | Mod: TC | Performed by: RADIOLOGY

## 2023-10-02 NOTE — PROGRESS NOTES
"  Assessment & Plan     Pain of right thumb  Will refer to ortho for further intervention, discussed xray results, she has zero pain at rest, could consider immobilization during sleep.   - XR Finger Right G/E 2 Views; Future  - Orthopedic  Referral; Future               ELVA Colby CNP  [facility] SHERRI Suh is a 73 year old, presenting for the following health issues:  Hand Pain        10/2/2023     8:07 AM   Additional Questions   Roomed by Elizabeth Meier CMA       Hand Pain    History of Present Illness       Reason for visit:  To check R thumb - pain and limited movement  Symptom onset:  1-2 weeks ago  Symptoms include:  Limited movement; the joints 'lock' in place  Symptom intensity:  Moderate  Symptom progression:  Staying the same  Had these symptoms before:  No  What makes it worse:  Stretching the thumb;  grasping to hold something  What makes it better:  No    She eats 2-3 servings of fruits and vegetables daily.She consumes 0 sweetened beverage(s) daily.She exercises with enough effort to increase her heart rate 9 or less minutes per day.  She exercises with enough effort to increase her heart rate 3 or less days per week.   She is taking medications regularly.     Slept on her thumb wrong 2 wks ago and when she woke up she had pain at the base of the thumb and it has a click, hurts worse with abduction of the thumb. No worsening pain since then, she is R handed, uses cane in her R hand and this doesn't seem to bother her. No history of thumb injury.       Review of Systems   Constitutional, HEENT, cardiovascular, pulmonary, GI, , musculoskeletal, neuro, skin, endocrine and psych systems are negative, except as otherwise noted.      Objective    /73 (BP Location: Right arm, Cuff Size: Adult Regular)   Pulse 68   Temp 97.5  F (36.4  C) (Oral)   Resp 16   Ht 1.664 m (5' 5.5\")   Wt 71.9 kg (158 lb 9.6 oz)   LMP  (LMP Unknown)   " SpO2 97%   BMI 25.99 kg/m    Body mass index is 25.99 kg/m .  Physical Exam   GENERAL: healthy, alert and no distress  MS: R hand exam finds no redness or swelling, ROM of thumb is compltely intact with a click of movement of DIP joint.

## 2023-10-10 ENCOUNTER — ONCOLOGY VISIT (OUTPATIENT)
Dept: ONCOLOGY | Facility: CLINIC | Age: 73
End: 2023-10-10
Attending: STUDENT IN AN ORGANIZED HEALTH CARE EDUCATION/TRAINING PROGRAM
Payer: COMMERCIAL

## 2023-10-10 VITALS
OXYGEN SATURATION: 96 % | RESPIRATION RATE: 16 BRPM | BODY MASS INDEX: 25.55 KG/M2 | WEIGHT: 159 LBS | SYSTOLIC BLOOD PRESSURE: 116 MMHG | HEART RATE: 85 BPM | DIASTOLIC BLOOD PRESSURE: 74 MMHG | HEIGHT: 66 IN

## 2023-10-10 DIAGNOSIS — Z17.0 MALIGNANT NEOPLASM OF UPPER-OUTER QUADRANT OF RIGHT BREAST IN FEMALE, ESTROGEN RECEPTOR POSITIVE (H): Primary | ICD-10-CM

## 2023-10-10 DIAGNOSIS — I89.0 LYMPHEDEMA OF LEFT LEG: ICD-10-CM

## 2023-10-10 DIAGNOSIS — R53.81 PHYSICAL DECONDITIONING: ICD-10-CM

## 2023-10-10 DIAGNOSIS — Z85.831 HISTORY OF SARCOMA OF SOFT TISSUE: ICD-10-CM

## 2023-10-10 DIAGNOSIS — Z96.642 HISTORY OF LEFT HIP REPLACEMENT: ICD-10-CM

## 2023-10-10 DIAGNOSIS — C50.411 MALIGNANT NEOPLASM OF UPPER-OUTER QUADRANT OF RIGHT BREAST IN FEMALE, ESTROGEN RECEPTOR POSITIVE (H): Primary | ICD-10-CM

## 2023-10-10 PROCEDURE — G0463 HOSPITAL OUTPT CLINIC VISIT: HCPCS | Performed by: STUDENT IN AN ORGANIZED HEALTH CARE EDUCATION/TRAINING PROGRAM

## 2023-10-10 PROCEDURE — 99215 OFFICE O/P EST HI 40 MIN: CPT | Performed by: STUDENT IN AN ORGANIZED HEALTH CARE EDUCATION/TRAINING PROGRAM

## 2023-10-10 ASSESSMENT — PAIN SCALES - GENERAL: PAINLEVEL: NO PAIN (0)

## 2023-10-10 NOTE — PROGRESS NOTES
[facility]   PM&R clinic note        Interval history:     Angeli Jacobson presents to clinic today for follow up reg her rehab needs.   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   Was last seen in clinic on 1/10/23.  Recommendations included:  Therapy/equipment/braces:  Continue lymphedema therapy.  Continue daily compression and rest.  Continue manual lymphatic drainage, try to aim for once or twice daily.  Referral / follow up with other providers:  Referral placed to Dr. Mckee for evaluation of left knee.  Follow up: 4 to 6 months.    Oncologic history:  1.  11/7/2022: Screening mammogram showed asymmetry in the right breast at 12:00, retroareolar far posterior.  Left breast negative.  2.  11/15/2022: Diagnostic right mammogram showed asymmetry in the posterior right breast, 8 cm from nipple.  Ultrasound of right breast at 12:00, 2 cm from nipple showed a small benign cyst but no definite sonographic correlate for the mammographic asymmetry.  3.  11/16/2022: Stereotactic guided right breast needle biopsy of 8 mm lesion at 3:00, 8 cm from the nipple showed grade 2 invasive ductal carcinoma, ER positive at %, MA positive at 60-70%, HER2 IHC = 2+ equivocal, HER2/compa FISH negative.  4. 12/14/2022: Underwent right breast lumpectomy with right axillary sentinel lymph node excision with Dr. Shelly Carr.  Pathology showed no residual invasive malignancy; 2 microscopic foci of DCIS, grade 2, largest measuring 1.2 mm; margins negative; total 2 right axillary lymph nodes negative.  5. 2/10/23- Follow up visit with Dr. Angulo. Recommend total of 5 years of  hormone blockade therapy. Agreed to try anastrozole. Continue lymphedema therapy and compression stockings for LLE lymphedema.  6. 6/19/23- Saw Dr. Angulo for follow up. Reported physical tiredness and joint stiffness since starting anastrozole.  noticed mental slowness. Tried protein powder which has helped a bit with energy level. Has 3 week cruise coming up at end of 7/2023. Due to increased mental/physical fatigue, suggested discontinue anastrozole and start letrozole after she returns from her trip. Agreed to start letrozole in 8/23. Due for next mammogramp in 11/23.      Symptoms,  Angeli was seen for a return visit today. Her , Chris, is present for the visit.  Since her last visit, she has overall been doing okay but still struggles with gait as a result of her left knee and prolonged muscular recovery postoperatively.  She saw her orthopedic surgeon after her last visit who she said did not examine or scan her, and told her that this was what was expected postop.  Per her  Chris, she is able to walk only shorter distances when compared to her previous baseline.  She still has limitations resulting from limited left hip flexion postoperatively in addition to limitations with active range of motion at left knee due to ongoing swelling.  Her left lower extremity lymphedema has overall been pretty well controlled with her compression garments, daily lymphatic massage and stretches.  She does feel that there is a daily fluctuation where swelling tends to accumulate towards the end of the day and resolves overnight when she is supine.  She feels that her lymphedema garments are in good condition and she does have a few that she rotates.  Since her last visit, instead of being seen by Dr. Mckee, she was told that he only sees med spine and was referred to another PM&R provider who evaluated bone health, which is something that they were not expecting.  The work-up did reveal some  parathyroid hormone abnormalities, and it was recommended that she start vitamin D, however the PM&R provider has since left the system and there was no follow-up in regards to a parathyroid ultrasound that was ordered and had some abnormal results.  Through her general practitioner, she was able to get recommendations for an endocrinologist who she should schedule an appointment for her, so she scheduled an initial evaluation with 1 which is coming up in December 2023.  She and her  went on a 3-week  cruise/trip in July and returned in August.  They will be traveling again to [place] for 3 weeks from mid October to mid November of this year.    Therapies/HEP,  Not currently participating in outpatient physical therapy or lymphedema therapy.  Continues with daily MLD, strengthening and stretching exercises.      Functionally,   No changes since her last visit.      Social history is unchanged.      Medications:  Current Outpatient Medications   Medication Sig Dispense Refill    calcium carbonate 600 mg-vitamin D 400 units (CALTRATE) 600-400 MG-UNIT per tablet Take 1 chew tab by mouth daily      clopidogrel (PLAVIX) 75 MG tablet Take 1 tablet (75 mg) by mouth daily 90 tablet 3    denosumab (PROLIA) 60 MG/ML SOSY injection       evolocumab (REPATHA) 140 MG/ML prefilled autoinjector Inject 1 mL (140 mg) Subcutaneous every 14 days 6 mL 3    ezetimibe (ZETIA) 10 MG tablet Take 1 tablet (10 mg) by mouth daily 90 tablet 3    levothyroxine (SYNTHROID/LEVOTHROID) 75 MCG tablet Take 1 tablet (75 mcg) by mouth daily 90 tablet 3    multivitamin, therapeutic (THERA-VIT) TABS tablet Take 1 tablet by mouth daily      nitroFURantoin macrocrystal-monohydrate (MACROBID) 100 MG capsule TAKE 1 CAPSULE BY MOUTH AFTER INTRCOURSE 30 capsule 3    rivaroxaban ANTICOAGULANT (XARELTO) 2.5 MG TABS tablet Take 1 tablet (2.5 mg) by mouth 2 times daily 180 tablet 1    rosuvastatin (CRESTOR) 20 MG tablet Take 1 tablet (20 mg) by  "mouth daily 90 tablet 3    solifenacin (VESICARE) 5 MG tablet Take 1 tablet (5 mg) by mouth daily 90 tablet 3    ciprofloxacin (CIPRO) 250 MG tablet Take 1 tablet (250 mg) by mouth 2 times daily 14 tablet 0              Physical Exam:   /74   Pulse 85   Resp 16   Ht 1.664 m (5' 5.5\")   Wt 72.1 kg (159 lb)   LMP  (LMP Unknown)   SpO2 96%   BMI 26.06 kg/m    Gen: NAD, pleasant and cooperative   HEENT: MMM  Pulm: non-labored breathing in room air  Abd: benign, superficial and deep palpation does not reveal any abnormalities.  No fluid shift noted.  Ext: Mild lymphedema in LLE extending from thigh to foot, stable from previous visit.. Scar tissue and tightness noted at anterior left thigh. Positive Stemmer sign on left.   No erythema, warmth noted.  Well-healed surgical incision over left knee.  Right knee with no surrounding erythema, some increased swelling over knee joint.  Fluid shift test positive for effusion.  Neuro/MSK: Limited ROM w/ left hip flexion, 0 degrees in full left knee extension and knee flexion to 90 degrees.  Visible swelling over left knee joint, fluid displacement noted on palpation.  No tenderness to palpation over joint lines.    Labs/Imaging:  Lab Results   Component Value Date    WBC 5.6 12/08/2022    HGB 13.5 12/08/2022    HCT 42.5 12/08/2022    MCV 94 12/08/2022     12/08/2022     Lab Results   Component Value Date     03/24/2023    POTASSIUM 4.3 03/24/2023    CHLORIDE 106 03/24/2023    CO2 23 03/24/2023    GLC 91 03/24/2023     Lab Results   Component Value Date    GFRESTIMATED >90 08/22/2023    GFRESTBLACK >90 05/17/2021     Lab Results   Component Value Date    AST 34 03/24/2023    ALT 24 06/01/2023    ALKPHOS 76 03/24/2023    BILITOTAL 0.3 03/24/2023    BILICONJ 0.0 06/06/2006     Lab Results   Component Value Date    INR 1.01 09/27/2019     Lab Results   Component Value Date    BUN 13.4 03/24/2023    CR 0.69 08/22/2023              Assessment/Plan   Angeli " PARTH Jacobson presents to clinic today for follow up reg her rehab needs.   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 Was last seen in clinic on 1/10/23.  Multiple rehabilitation considerations were discussed with Angeli and her  at today's visit.  She should continue MLD techniques daily, and she has adequate compression garments so does not need a new order today but will continue to use these daily when up and active.  We discussed concern for increased abdominal fluid/not draining, however exam today did not reveal any abdominal fluid so this is likely true weight gain as was mentioned by her PCP as well.  In addition, we discussed her left knee limitations and gait limitations related to her left hip/knee at length today.  She would benefit from outpatient physical therapy to help with ongoing recovery, as well as a visit with Dr. Mckee to further evaluate left knee and any other recommendations he might have.  We will reach out to him today to let him know if the referral in light of what happened after our last visit.  We will plan a return visit in 6 months.  Rossy and her  are in agreement with this plan.      1.Therapy/equipment/braces:  1.Continue lymphedema therapy.  2.Continue daily compression and rest.  3.Continue manual lymphatic drainage, try to aim for once or twice daily.  Referral / follow up with other providers:   1. Referral to Dr. Mckee for further evaluation and management of left knee concerns.   2. Referral to PT for left knee ROM, strength, gait and balance in setting of left hip deficits.  Follow up: 4 to 6 months.      Leeann Sharma MD  Physical  Medicine & Rehabilitation      50 minutes spent on the date of the encounter doing chart review, history and exam, documentation and further activities as noted above.

## 2023-10-10 NOTE — PROGRESS NOTES
ASSESSMENT & PLAN           Today we discussed the underlying etiology/pathology of patient's   1. Flexor tenosynovitis of thumb    2. 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  - We discussed the patient does have flexor tenosynovitis of her right thumb with pain over the A1 pulley site on the volar surface.  - We discussed treatment options including topical ice, topical anti-inflammatory such as Voltaren 1% gel to be applied up to 3 times a day, activity modification to minimize gripping grasping and direct pressure to this area as well as consideration for oral versus cortisone injection.  - Patient cannot take oral anti-inflammatories due to being on dual anticoagulation therapy with Plavix and Xarelto for cardiovascular history  - Ultimately patient would like to proceed with injection which was done today to the volar surface of the right thumb at the A1 pulley level.  Risk and benefits thoroughly discussed before proceeding with injection which included risk of infection, bleeding as well as paresthesia of the thumb temporarily  - Patient will monitor her symptoms over the next 3 weeks and if not improved will come back for repeat assessment  - Patient should utilize ice to the thumb at least 3 times a day for 15-minute intervals with a skin barrier to minimize skin injury or freezing and utilize Tylenol up to 3000 mg daily for discomfort.  - Patient may start utilizing topical Voltaren 1% gel over-the-counter to the length of the right thumb up to 3 times a day after skin is healed from the injection procedure today.    -Call direct clinic number [[phone]] at any time with questions or concerns in regards to your recent office visit with  "me.     Saturnino Quinn PA-C  [facility]    1. Flexor tenosynovitis of thumb    2. 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       Patient instructions after cortisone injection:   - Do not soak in a hot tub, bath or swimming pool for 48 hours to minimize risk of infection to injection site  - Ok to shower for daily cleaning  - Apply ice today to injection site for 10-15 minutes up to 3 times a day and only do your normal amounts of activity to prevent increased inflammation and pain for the next few days  - The lidocaine, \"numbing medicine\" (what is giving you pain relief right now) will likely wear off in 1-2 hours.  Once the lidocaine wears off you may note symptoms similar to before you received the injection and this is normal and expected. The corticosteroid which is designed to give prolonged relief of your symptoms will not reach maximum effect/relief until approximately 1-2 weeks from date of injection.   -In a small percentage of people, cortisone can cause flushing/redness in the face. This usually lasts for 1-3 days and resolves. Cool compress to the face, low dose benadryl and Ibuprofen/Tylenol can help if this happens.  -If you are diabetic you will notice increased blood sugar levels for up to a week due to your cortisone injection. Adjust your insulin sliding scale to correct your blood sugar levels.  If you are diabetic and take only oral/pill glycemic control medications  please continue with your normal daily dosing.  If any concerns in regards to your blood sugars or diabetic medications please address this with your primary care provider managing your diabetes.    -Rarely for a small percentage of patients, the area " "injected can become more painful and difficult to move for 24-36 hours after your injection due to a local inflammation response to the injection.  This is called a \"cortisone flare\".   It will resolve over a couple days on its own.  Rest, gentle motion of the joint, Tylenol, NSAID's (ibuprofen, Aleve) and ice to the area are all helpful to minimize discomfort.  -Cortisone injections can be repeated anytime after 4 months if your pain returns but should only be repeated if you are symptomatic and not to be done for preventative reasons.     Follow up for repeat assessment and possible repeat injection if pain returns.     SUBJECTIVE  Angeli Jacobson is a/an 73 year old female who is seen in consultation at the request of  Noemí Ramesh C.N.P. for evaluation of right thumb pain. The patient is seen by themselves.    Onset: 3 week(s) ago. Reports insidious onset without acute precipitating event.  Patient was utilizing a cane in her right hand which may have caused increased pressure or irritation to her thumb with gripping but otherwise she has not done any activity to cause current condition.  She has noticed enlargement of the IP joint of both thumbs but she has no pain at this level and no history of recent injury.  Location of Pain: right thumb -MCP joint/thenar eminence on the volar surface  Rating of Pain at worst: 10/10 with use  Rating of Pain Currently: 0/10 at rest  Worsened by: pressure of the pad of the thumb, gripping, ABD of the thumb  Better with: none  Treatments tried: no treatment tried to date  Quality: sharp, stabbing  Associated symptoms: swelling, weakness of the hand with gripping, locking or catching, and clicking of the PIP joint  Orthopedic history: NO  Relevant surgical history: NO  Social history: social history: retired    Past Medical History:   Diagnosis Date    * * * SBE PROPHYLAXIS * * * 1998    Amox 500mg, take 4 tabs one hour prior to procedure.Takes this because of " lymphedema secondary from leg surgey    Antiplatelet or antithrombotic long-term use     Arrhythmia     Central serous retinopathy 2001    Resolved 9/2001    CHRONIC NECK PAIN 1995    Depressive disorder     Depressive disorder, not elsewhere classified 2001    Elevated coronary artery calcium score=7/1/21 08/03/2021    Elevated coronary artery calcium score=7/1/21 08/03/2021    History of blood transfusion 04/2019 12/2020    during left hip pinning, during surgery for patella    Lateral epicondylitis     MIXED HYPERLIPIDEMIA, LDL GOAL <160 1998    LDL goal < 160    Motion sickness     MYXOID LIPOSARCOMA 2000    Left thigh, S/P excision, radiation  at [facility]    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    CHRONIC LEFT THIGH LYMPHEDEMA    Nontoxic multinodular goiter 2005    needs yearly US    Osteoporosis, unspecified 2001    Other chronic pain     fx ribs left     Other lymphedema 2000    left thigh, gets regular PT for this    Overactive bladder     PAD (peripheral artery disease) (H24) 04/20/2018    PONV (postoperative nausea and vomiting)     SHINGLES 2001    Sprain of lumbosacral (joint) (ligament) 1995    right    Unspecified hearing loss 1998    chronic tinnitus    Unspecified tinnitus 1998     Social History     Socioeconomic History    Marital status:      Spouse name: Chris    Number of children: 3    Years of education: 14    Highest education level: Associate degree: academic program   Occupational History    Occupation: at home     Employer: NONE    Tobacco Use    Smoking status: Never     Passive exposure: Never    Smokeless tobacco: Never   Vaping Use    Vaping Use: Never used   Substance and Sexual Activity    Alcohol use: Never    Drug use: Never    Sexual activity: Yes     Partners: Male     Birth control/protection: Male Surgical     Comment:  had a vasectomy   Other Topics Concern    Parent/sibling w/ CABG, MI or angioplasty before 65F 55M? No     Social Determinants of Health      Financial Resource Strain: Low Risk  (10/1/2023)    Financial Resource Strain     Within the past 12 months, have you or your family members you live with been unable to get utilities (heat, electricity) when it was really needed?: No   Food Insecurity: Low Risk  (10/1/2023)    Food Insecurity     Within the past 12 months, did you worry that your food would run out before you got money to buy more?: No     Within the past 12 months, did the food you bought just not last and you didn t have money to get more?: No   Transportation Needs: Low Risk  (10/1/2023)    Transportation Needs     Within the past 12 months, has lack of transportation kept you from medical appointments, getting your medicines, non-medical meetings or appointments, work, or from getting things that you need?: No   Physical Activity: Inactive (12/16/2021)    Exercise Vital Sign     Days of Exercise per Week: 0 days     Minutes of Exercise per Session: 0 min   Stress: Stress Concern Present (12/16/2021)    [employer] [place] [employer] - Occupational Stress Questionnaire     Feeling of Stress : To some extent   Social Connections: Moderately Integrated (12/16/2021)    Social Connection and Isolation Panel [NHANES]     Frequency of Communication with Friends and Family: Never     Frequency of Social Gatherings with Friends and Family: Never     Attends Orthodoxy Services: More than 4 times per year     Active Member of Clubs or Organizations: Yes     Marital Status:    Interpersonal Safety: Low Risk  (10/2/2023)    Interpersonal Safety     Do you feel physically and emotionally safe where you currently live?: Yes     Within the past 12 months, have you been hit, slapped, kicked or otherwise physically hurt by someone?: No     Within the past 12 months, have you been humiliated or emotionally abused in other ways by your partner or ex-partner?: No   Housing Stability: Low Risk  (10/1/2023)    Housing Stability     Do you have  housing? : Yes     Are you worried about losing your housing?: No         Patient's past medical, surgical, social, and family histories were personally reviewed today and no changes are noted.    REVIEW OF SYSTEMS:  10 point ROS is negative other than symptoms noted above in HPI, Past Medical History or as stated below  Constitutional: NEGATIVE for fever, chills, change in weight  Skin: NEGATIVE for worrisome rashes, moles or lesions  GI/: NEGATIVE for bowel or bladder changes  Neuro: NEGATIVE for weakness, dizziness or paresthesias    OBJECTIVE:  LMP  (LMP Unknown)    General: healthy, alert and in no distress  HEENT: no scleral icterus or conjunctival erythema  Skin: no suspicious lesions or rash. No jaundice.  CV: no pedal edema  Resp: normal respiratory effort without conversational dyspnea   Psych: normal mood and affect  Gait: normal steady gait with appropriate coordination and balance  Neuro: Normal light sensory exam of lower extremity      MSK:  [facility] shows a very pleasant 73-year-old female who ambulates full weightbearing without assistive device.  Slight antalgic gait.  Examination of the right hand shows diffuse osteoarthritic findings mainly of the IP joint of the thumbs as well as diffusely through the remaining hand joints.  No pain to palpation around the IP joint itself of the right thumb.  She has no john triggering or locking but does have stiffness of the finger specifically flexion.  She is point tender on the volar surface at the A1 pulley level on the flexor tendon sheath of the right thumb.  No triggering or locking noted of the remaining digits.  No thenar or hypothenar atrophy.  No skin breakdown rashes or lesions.  Motor tone is slightly decreased secondary to pain with  and grasping with thumb opposition on the right hand compared to the left.  She is neurovascularly intact.  Ligament exam is stable of the thumb including the radial and ulnar collateral ligaments of the MCP joint.   Grind test of the thumb is negative without pain.  Finkelstein's test is negative for first dorsal compartment tenderness but grasping of the thumb into a flexed position generates pain on the volar aspect of the MCP joint of the thumb on the right side.      Independent visualization of the below image:  Previous x-rays of the right thumb from 10/2/2023 are personally reviewed showing arthritic findings of the IP joint with small chronic bony fragments.  I agree with below radiology interpretation.    XR FINGER RIGHT G/E 2 VIEWS   10/2/2023 8:36 AM      HISTORY: Pain of right thumb  COMPARISON: None.                                                                     IMPRESSION: Normal alignment. No acute fracture. Small chronic bone  fragment adjacent to the IP joint thumb, and mild degenerative  arthritis.     HALLEY VAZQUEZ MD    Small Joint Injection/Arthrocentesis: R thumb MCP    Date/Time: 10/11/2023 9:14 AM    Performed by: Saturnino Quinn PA-C  Authorized by: Saturnino Quinn PA-C  Indications:  Pain  Needle Size:  22 G  Guidance: landmark     Approach:  Volar  Location:  Thumb    Site:  R thumb MCP                    Medications:  10 mg triamcinolone 40 MG/ML; 0.5 mL lidocaine 1 %; 0.5 mL ROPivacaine 5 MG/ML        Outcome:  Tolerated well, no immediate complications  Procedure discussed: discussed risks, benefits, and alternatives    Consent Given by:  Patient  Timeout: timeout called immediately prior to procedure    Prep: patient was prepped and draped in usual sterile fashion            Patient's conditions were thoroughly discussed during today's visit with total time spent face-to-face with the patient and documentation being 30 minutes.    Saturnino Quinn PA-C  [facility]

## 2023-10-10 NOTE — PATIENT INSTRUCTIONS
1.  As discussed, Dr. Sharma has placed a referral to Dr. Mckee for further evaluation and management of your left knee symptoms.  2.  A referral to outpatient physical therapy has been placed for a focus on your left knee and left hip as well as your gait.  3.  Continue your daily regimen for control of lymphedema including daily compression garments, manual lymphatic drainage massage and stretches.  4.  Follow-up with Dr. Sharma in 6 months.

## 2023-10-10 NOTE — LETTER
10/10/2023         RE: Angeli Jacobson  [id number]  [facility] [id number]        Dear Colleague,    Thank you for referring your patient, Angeli Jacobson, to the [facility]. Please see a copy of my visit note below.    [facility]   PM&R clinic note        Interval history:     Angeli Jacobson presents to clinic today for follow up reg her rehab needs.   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   Was last seen in clinic on 1/10/23.  Recommendations included:  Therapy/equipment/braces:  Continue lymphedema therapy.  Continue daily compression and rest.  Continue manual lymphatic drainage, try to aim for once or twice daily.  Referral / follow up with other providers:  Referral placed to Dr. Mckee for evaluation of left knee.  Follow up: 4 to 6 months.    Oncologic history:  1.  11/7/2022: Screening mammogram showed asymmetry in the right breast at 12:00, retroareolar far posterior.  Left breast negative.  2.  11/15/2022: Diagnostic right mammogram showed asymmetry in the posterior right breast, 8 cm from nipple.  Ultrasound of right breast at 12:00, 2 cm from nipple showed a small benign cyst but no definite sonographic correlate for the mammographic asymmetry.  3.  11/16/2022: Stereotactic guided right breast needle biopsy of 8 mm lesion at 3:00, 8 cm from the nipple showed grade 2 invasive ductal carcinoma, ER positive at %, KS positive at 60-70%, HER2 IHC = 2+ equivocal, HER2/compa FISH negative.  4. 12/14/2022: Underwent right breast lumpectomy with right axillary sentinel lymph node excision with  Dr. Shelly Carr.  Pathology showed no residual invasive malignancy; 2 microscopic foci of DCIS, grade 2, largest measuring 1.2 mm; margins negative; total 2 right axillary lymph nodes negative.  5. 2/10/23- Follow up visit with Dr. Angulo. Recommend total of 5 years of hormone blockade therapy. Agreed to try anastrozole. Continue lymphedema therapy and compression stockings for LLE lymphedema.  6. 6/19/23- Saw Dr. Angulo for follow up. Reported physical tiredness and joint stiffness since starting anastrozole.  noticed mental slowness. Tried protein powder which has helped a bit with energy level. Has 3 week cruise coming up at end of 7/2023. Due to increased mental/physical fatigue, suggested discontinue anastrozole and start letrozole after she returns from her trip. Agreed to start letrozole in 8/23. Due for next mammogramp in 11/23.      Symptoms,  Angeli was seen for a return visit today. Her , Chris, is present for the visit.  Since her last visit, she has overall been doing okay but still struggles with gait as a result of her left knee and prolonged muscular recovery postoperatively.  She saw her orthopedic surgeon after her last visit who she said did not examine or scan her, and told her that this was what was expected postop.  Per her  Chris, she is able to walk only shorter distances when compared to her previous baseline.  She still has limitations resulting from limited left hip flexion postoperatively in addition to limitations with active range of motion at left knee due to ongoing swelling.  Her left lower extremity lymphedema has overall been pretty well controlled with her compression garments, daily lymphatic massage and stretches.  She does feel that there is a daily fluctuation where swelling tends to accumulate towards the end of the day and resolves overnight when she is supine.  She feels that her lymphedema garments are in good condition and she does have a few  that she rotates.  Since her last visit, instead of being seen by Dr. Mckee, she was told that he only sees med spine and was referred to another PM&R provider who evaluated bone health, which is something that they were not expecting.  The work-up did reveal some parathyroid hormone abnormalities, and it was recommended that she start vitamin D, however the PM&R provider has since left the system and there was no follow-up in regards to a parathyroid ultrasound that was ordered and had some abnormal results.  Through her general practitioner, she was able to get recommendations for an endocrinologist who she should schedule an appointment for her, so she scheduled an initial evaluation with 1 which is coming up in December 2024.  She and her  went on a 3-week  cruise/trip in July and returned in August.  They will be traveling again to [place] for 3 weeks from mid October to mid November of this year.    Therapies/HEP,  Not currently participating in outpatient physical therapy or lymphedema therapy.  Continues with daily MLD, strengthening and stretching exercises.      Functionally,   No changes since her last visit.      Social history is unchanged.      Medications:  Current Outpatient Medications   Medication Sig Dispense Refill     calcium carbonate 600 mg-vitamin D 400 units (CALTRATE) 600-400 MG-UNIT per tablet Take 1 chew tab by mouth daily       clopidogrel (PLAVIX) 75 MG tablet Take 1 tablet (75 mg) by mouth daily 90 tablet 3     denosumab (PROLIA) 60 MG/ML SOSY injection        evolocumab (REPATHA) 140 MG/ML prefilled autoinjector Inject 1 mL (140 mg) Subcutaneous every 14 days 6 mL 3     ezetimibe (ZETIA) 10 MG tablet Take 1 tablet (10 mg) by mouth daily 90 tablet 3     levothyroxine (SYNTHROID/LEVOTHROID) 75 MCG tablet Take 1 tablet (75 mcg) by mouth daily 90 tablet 3     multivitamin, therapeutic (THERA-VIT) TABS tablet Take 1 tablet by mouth daily       nitroFURantoin  "macrocrystal-monohydrate (MACROBID) 100 MG capsule TAKE 1 CAPSULE BY MOUTH AFTER INTRCOURSE 30 capsule 3     rivaroxaban ANTICOAGULANT (XARELTO) 2.5 MG TABS tablet Take 1 tablet (2.5 mg) by mouth 2 times daily 180 tablet 1     rosuvastatin (CRESTOR) 20 MG tablet Take 1 tablet (20 mg) by mouth daily 90 tablet 3     solifenacin (VESICARE) 5 MG tablet Take 1 tablet (5 mg) by mouth daily 90 tablet 3     ciprofloxacin (CIPRO) 250 MG tablet Take 1 tablet (250 mg) by mouth 2 times daily 14 tablet 0              Physical Exam:   /74   Pulse 85   Resp 16   Ht 1.664 m (5' 5.5\")   Wt 72.1 kg (159 lb)   LMP  (LMP Unknown)   SpO2 96%   BMI 26.06 kg/m    Gen: NAD, pleasant and cooperative   HEENT: MMM  Pulm: non-labored breathing in room air  Abd: benign, superficial and deep palpation does not reveal any abnormalities.  No fluid shift noted.  Ext: Mild lymphedema in LLE extending from thigh to foot, stable from previous visit.. Scar tissue and tightness noted at anterior left thigh. Positive Stemmer sign on left.   No erythema, warmth noted.  Well-healed surgical incision over left knee.  Right knee with no surrounding erythema, some increased swelling over knee joint.  Fluid shift test positive for effusion.  Neuro/MSK: Limited ROM w/ left hip flexion, 0 degrees in full left knee extension and knee flexion to 90 degrees.  Visible swelling over left knee joint, fluid displacement noted on palpation.  No tenderness to palpation over joint lines.    Labs/Imaging:  Lab Results   Component Value Date    WBC 5.6 12/08/2022    HGB 13.5 12/08/2022    HCT 42.5 12/08/2022    MCV 94 12/08/2022     12/08/2022     Lab Results   Component Value Date     03/24/2023    POTASSIUM 4.3 03/24/2023    CHLORIDE 106 03/24/2023    CO2 23 03/24/2023    GLC 91 03/24/2023     Lab Results   Component Value Date    GFRESTIMATED >90 08/22/2023    GFRESTBLACK >90 05/17/2021     Lab Results   Component Value Date    AST 34 03/24/2023 "    ALT 24 06/01/2023    ALKPHOS 76 03/24/2023    BILITOTAL 0.3 03/24/2023    BILICONJ 0.0 06/06/2006     Lab Results   Component Value Date    INR 1.01 09/27/2019     Lab Results   Component Value Date    BUN 13.4 03/24/2023    CR 0.69 08/22/2023              Assessment/Plan   Angeli Jacobson presents to clinic today for follow up reg her rehab needs.   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 Was last seen in clinic on 1/10/23.  Multiple rehabilitation considerations were discussed with Angeli and her  at today's visit.  She should continue MLD techniques daily, and she has adequate compression garments so does not need a new order today but will continue to use these daily when up and active.  We discussed concern for increased abdominal fluid/not draining, however exam today did not reveal any abdominal fluid so this is likely true weight gain as was mentioned by her PCP as well.  In addition, we discussed her left knee limitations and gait limitations related to her left hip/knee at length today.  She would benefit from outpatient physical therapy to help with ongoing recovery, as well as a visit with Dr. Mckee to further evaluate left knee and any other recommendations he might have.  We will reach out to him today to let him know if the referral in light of what happened after our last visit.  We will plan a return visit in 6 months.  Rossy and her  are in agreement with this plan.      1.Therapy/equipment/braces:  1.Continue lymphedema therapy.  2.Continue daily compression and rest.  3.Continue manual lymphatic drainage, try to aim for once or twice  "daily.  Referral / follow up with other providers:   1. Referral to Dr. Mckee for further evaluation and management of left knee concerns.   2. Referral to PT for left knee ROM, strength, gait and balance in setting of left hip deficits.  Follow up: 4 to 6 months.      Leeann Sharma MD  Physical Medicine & Rehabilitation      50 minutes spent on the date of the encounter doing chart review, history and exam, documentation and further activities as noted above.             Oncology Rooming Note    October 10, 2023 9:28 AM   Angeli Jacobson is a 73 year old female who presents for:    Chief Complaint   Patient presents with     Oncology Clinic Visit     Initial Vitals: Resp 16   Ht 1.664 m (5' 5.5\")   Wt 72.1 kg (159 lb)   LMP  (LMP Unknown)   BMI 26.06 kg/m   Estimated body mass index is 26.06 kg/m  as calculated from the following:    Height as of this encounter: 1.664 m (5' 5.5\").    Weight as of this encounter: 72.1 kg (159 lb). Body surface area is 1.83 meters squared.  No Pain (0) Comment: Data Unavailable   No LMP recorded (lmp unknown). Patient is postmenopausal.  Allergies reviewed: Yes  Medications reviewed: Yes    Medications: Medication refills not needed today.  Pharmacy name entered into [employer]:    [id number] [place] [facility] - [place], [place] - [address] [id number], [place] - [address] [address] [place]              Again, thank you for allowing me to participate in the care of your patient.        Sincerely,        Leeann Sharma MD  "

## 2023-10-10 NOTE — PROGRESS NOTES
"Oncology Rooming Note    October 10, 2023 9:28 AM   Angeli PARTH Jacobson is a 73 year old female who presents for:    Chief Complaint   Patient presents with    Oncology Clinic Visit     Initial Vitals: Resp 16   Ht 1.664 m (5' 5.5\")   Wt 72.1 kg (159 lb)   LMP  (LMP Unknown)   BMI 26.06 kg/m   Estimated body mass index is 26.06 kg/m  as calculated from the following:    Height as of this encounter: 1.664 m (5' 5.5\").    Weight as of this encounter: 72.1 kg (159 lb). Body surface area is 1.83 meters squared.  No Pain (0) Comment: Data Unavailable   No LMP recorded (lmp unknown). Patient is postmenopausal.  Allergies reviewed: Yes  Medications reviewed: Yes    Medications: Medication refills not needed today.  Pharmacy name entered into [employer]:    [id number] [place] [facility] - [place], [place] - [address] [id number] - [place], [place] - [address] [place]            "

## 2023-10-11 ENCOUNTER — LAB (OUTPATIENT)
Dept: LAB | Facility: CLINIC | Age: 73
End: 2023-10-11
Payer: COMMERCIAL

## 2023-10-11 ENCOUNTER — OFFICE VISIT (OUTPATIENT)
Dept: ORTHOPEDICS | Facility: CLINIC | Age: 73
End: 2023-10-11
Attending: NURSE PRACTITIONER
Payer: COMMERCIAL

## 2023-10-11 ENCOUNTER — ALLIED HEALTH/NURSE VISIT (OUTPATIENT)
Dept: FAMILY MEDICINE | Facility: CLINIC | Age: 73
End: 2023-10-11
Payer: COMMERCIAL

## 2023-10-11 DIAGNOSIS — R73.01 IMPAIRED FASTING GLUCOSE: Primary | ICD-10-CM

## 2023-10-11 DIAGNOSIS — M65.949 FLEXOR TENOSYNOVITIS OF THUMB: Primary | ICD-10-CM

## 2023-10-11 DIAGNOSIS — E03.9 HYPOTHYROIDISM, UNSPECIFIED TYPE: ICD-10-CM

## 2023-10-11 DIAGNOSIS — M15.8 OSTEOARTHRITIS OF INTERPHALANGEAL JOINT OF RIGHT THUMB: ICD-10-CM

## 2023-10-11 DIAGNOSIS — E78.5 HYPERLIPIDEMIA LDL GOAL <70: ICD-10-CM

## 2023-10-11 DIAGNOSIS — Z23 HIGH PRIORITY FOR 2019-NCOV VACCINE: Primary | ICD-10-CM

## 2023-10-11 LAB
ALBUMIN SERPL BCG-MCNC: 4.4 G/DL (ref 3.5–5.2)
ALP SERPL-CCNC: 68 U/L (ref 35–104)
ALT SERPL W P-5'-P-CCNC: 19 U/L (ref 0–50)
ANION GAP SERPL CALCULATED.3IONS-SCNC: 10 MMOL/L (ref 7–15)
AST SERPL W P-5'-P-CCNC: 33 U/L (ref 0–45)
BILIRUB SERPL-MCNC: 0.4 MG/DL
BUN SERPL-MCNC: 14.1 MG/DL (ref 8–23)
CALCIUM SERPL-MCNC: 10 MG/DL (ref 8.8–10.2)
CHLORIDE SERPL-SCNC: 106 MMOL/L (ref 98–107)
CHOLEST SERPL-MCNC: 185 MG/DL
CREAT SERPL-MCNC: 0.72 MG/DL (ref 0.51–0.95)
DEPRECATED HCO3 PLAS-SCNC: 26 MMOL/L (ref 22–29)
EGFRCR SERPLBLD CKD-EPI 2021: 88 ML/MIN/1.73M2
GLUCOSE SERPL-MCNC: 89 MG/DL (ref 70–99)
HDLC SERPL-MCNC: 56 MG/DL
LDLC SERPL CALC-MCNC: 102 MG/DL
NONHDLC SERPL-MCNC: 129 MG/DL
POTASSIUM SERPL-SCNC: 4.1 MMOL/L (ref 3.4–5.3)
PROT SERPL-MCNC: 7.4 G/DL (ref 6.4–8.3)
SODIUM SERPL-SCNC: 142 MMOL/L (ref 135–145)
TRIGL SERPL-MCNC: 133 MG/DL
TSH SERPL DL<=0.005 MIU/L-ACNC: 1.23 UIU/ML (ref 0.3–4.2)

## 2023-10-11 PROCEDURE — 36415 COLL VENOUS BLD VENIPUNCTURE: CPT

## 2023-10-11 PROCEDURE — 91320 SARSCV2 VAC 30MCG TRS-SUC IM: CPT

## 2023-10-11 PROCEDURE — 80061 LIPID PANEL: CPT

## 2023-10-11 PROCEDURE — 80053 COMPREHEN METABOLIC PANEL: CPT

## 2023-10-11 PROCEDURE — 90480 ADMN SARSCOV2 VAC 1/ONLY CMP: CPT

## 2023-10-11 PROCEDURE — 20600 DRAIN/INJ JOINT/BURSA W/O US: CPT | Mod: F5 | Performed by: PHYSICIAN ASSISTANT

## 2023-10-11 PROCEDURE — 99204 OFFICE O/P NEW MOD 45 MIN: CPT | Mod: 25 | Performed by: PHYSICIAN ASSISTANT

## 2023-10-11 PROCEDURE — 84443 ASSAY THYROID STIM HORMONE: CPT

## 2023-10-11 RX ORDER — TRIAMCINOLONE ACETONIDE 40 MG/ML
10 INJECTION, SUSPENSION INTRA-ARTICULAR; INTRAMUSCULAR
Status: SHIPPED | OUTPATIENT
Start: 2023-10-11

## 2023-10-11 RX ORDER — ROPIVACAINE HYDROCHLORIDE 5 MG/ML
0.5 INJECTION, SOLUTION EPIDURAL; INFILTRATION; PERINEURAL
Status: SHIPPED | OUTPATIENT
Start: 2023-10-11

## 2023-10-11 RX ORDER — LIDOCAINE HYDROCHLORIDE 10 MG/ML
0.5 INJECTION, SOLUTION INFILTRATION; PERINEURAL
Status: SHIPPED | OUTPATIENT
Start: 2023-10-11

## 2023-10-11 RX ADMIN — TRIAMCINOLONE ACETONIDE 10 MG: 40 INJECTION, SUSPENSION INTRA-ARTICULAR; INTRAMUSCULAR at 09:14

## 2023-10-11 RX ADMIN — ROPIVACAINE HYDROCHLORIDE 0.5 ML: 5 INJECTION, SOLUTION EPIDURAL; INFILTRATION; PERINEURAL at 09:14

## 2023-10-11 RX ADMIN — LIDOCAINE HYDROCHLORIDE 0.5 ML: 10 INJECTION, SOLUTION INFILTRATION; PERINEURAL at 09:14

## 2023-10-11 NOTE — PATIENT INSTRUCTIONS
Today we discussed the underlying etiology/pathology of patient's   1. Flexor tenosynovitis of thumb    2. 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  - We discussed the patient does have flexor tenosynovitis of her right thumb with pain over the A1 pulley site on the volar surface.  - We discussed treatment options including topical ice, topical anti-inflammatory such as Voltaren 1% gel to be applied up to 3 times a day, activity modification to minimize gripping grasping and direct pressure to this area as well as consideration for oral versus cortisone injection.  - Patient cannot take oral anti-inflammatories due to being on dual anticoagulation therapy with Plavix and Xarelto for cardiovascular history  - Ultimately patient would like to proceed with injection which was done today to the volar surface of the right thumb at the A1 pulley level.  Risk and benefits thoroughly discussed before proceeding with injection which included risk of infection, bleeding as well as paresthesia of the thumb temporarily  - Patient will monitor her symptoms over the next 3 weeks and if not improved will come back for repeat assessment  - Patient should utilize ice to the thumb at least 3 times a day for 15-minute intervals with a skin barrier to minimize skin injury or freezing and utilize Tylenol up to 3000 mg daily for discomfort.  - Patient may start utilizing topical Voltaren 1% gel over-the-counter to the length of the right thumb up to 3 times a day after skin is healed from the injection procedure today.    -Call direct clinic number [[phone]] at any time with questions or concerns in regards to your recent office visit with me.     Saturnino Quinn  "COURTNEY  [facility]    1. Flexor tenosynovitis of thumb    2. 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       Patient instructions after cortisone injection:   - Do not soak in a hot tub, bath or swimming pool for 48 hours to minimize risk of infection to injection site  - Ok to shower for daily cleaning  - Apply ice today to injection site for 10-15 minutes up to 3 times a day and only do your normal amounts of activity to prevent increased inflammation and pain for the next few days  - The lidocaine, \"numbing medicine\" (what is giving you pain relief right now) will likely wear off in 1-2 hours.  Once the lidocaine wears off you may note symptoms similar to before you received the injection and this is normal and expected. The corticosteroid which is designed to give prolonged relief of your symptoms will not reach maximum effect/relief until approximately 1-2 weeks from date of injection.   -In a small percentage of people, cortisone can cause flushing/redness in the face. This usually lasts for 1-3 days and resolves. Cool compress to the face, low dose benadryl and Ibuprofen/Tylenol can help if this happens.  -If you are diabetic you will notice increased blood sugar levels for up to a week due to your cortisone injection. Adjust your insulin sliding scale to correct your blood sugar levels.  If you are diabetic and take only oral/pill glycemic control medications  please continue with your normal daily dosing.  If any concerns in regards to your blood sugars or diabetic medications please address this with your primary care provider managing your diabetes.    -Rarely for a small percentage of patients, the area injected can become more " "painful and difficult to move for 24-36 hours after your injection due to a local inflammation response to the injection.  This is called a \"cortisone flare\".   It will resolve over a couple days on its own.  Rest, gentle motion of the joint, Tylenol, NSAID's (ibuprofen, Aleve) and ice to the area are all helpful to minimize discomfort.  -Cortisone injections can be repeated anytime after 4 months if your pain returns but should only be repeated if you are symptomatic and not to be done for preventative reasons.     Follow up for repeat assessment and possible repeat injection if pain returns.                 "

## 2023-10-11 NOTE — LETTER
10/11/2023         RE: Angeli Jacobson  [id number]  [facility] [address]        Dear Colleague,    Thank you for referring your patient, Angeli Jacobson, to the [facility] [place]. Please see a copy of my visit note below.    ASSESSMENT & PLAN           Today we discussed the underlying etiology/pathology of patient's   1. Flexor tenosynovitis of thumb    2. 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  - We discussed the patient does have flexor tenosynovitis of her right thumb with pain over the A1 pulley site on the volar surface.  - We discussed treatment options including topical ice, topical anti-inflammatory such as Voltaren 1% gel to be applied up to 3 times a day, activity modification to minimize gripping grasping and direct pressure to this area as well as consideration for oral versus cortisone injection.  - Patient cannot take oral anti-inflammatories due to being on dual anticoagulation therapy with Plavix and Xarelto for cardiovascular history  - Ultimately patient would like to proceed with injection which was done today to the volar surface of the right thumb at the A1 pulley level.  Risk and benefits thoroughly discussed before proceeding with injection which included risk of infection, bleeding as well as paresthesia of the thumb temporarily  - Patient will monitor her symptoms over the next 3 weeks and if not improved will come back for repeat assessment  - Patient should utilize ice to the thumb at least 3 times a day for 15-minute intervals with a skin barrier to minimize skin injury or freezing and utilize Tylenol up to 3000 mg daily for discomfort.  - Patient may start  "utilizing topical Voltaren 1% gel over-the-counter to the length of the right thumb up to 3 times a day after skin is healed from the injection procedure today.    -Call direct clinic number [[phone]] at any time with questions or concerns in regards to your recent office visit with me.     Saturnino Quinn PA-C  [facility]    1. Flexor tenosynovitis of thumb    2. 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       Patient instructions after cortisone injection:   - Do not soak in a hot tub, bath or swimming pool for 48 hours to minimize risk of infection to injection site  - Ok to shower for daily cleaning  - Apply ice today to injection site for 10-15 minutes up to 3 times a day and only do your normal amounts of activity to prevent increased inflammation and pain for the next few days  - The lidocaine, \"numbing medicine\" (what is giving you pain relief right now) will likely wear off in 1-2 hours.  Once the lidocaine wears off you may note symptoms similar to before you received the injection and this is normal and expected. The corticosteroid which is designed to give prolonged relief of your symptoms will not reach maximum effect/relief until approximately 1-2 weeks from date of injection.   -In a small percentage of people, cortisone can cause flushing/redness in the face. This usually lasts for 1-3 days and resolves. Cool compress to the face, low dose benadryl and Ibuprofen/Tylenol can help if this happens.  -If you are diabetic you will notice increased blood sugar levels for up to a week due to your cortisone injection. Adjust your insulin sliding scale to correct your blood sugar levels.  If you are diabetic and take only oral/pill " "glycemic control medications  please continue with your normal daily dosing.  If any concerns in regards to your blood sugars or diabetic medications please address this with your primary care provider managing your diabetes.    -Rarely for a small percentage of patients, the area injected can become more painful and difficult to move for 24-36 hours after your injection due to a local inflammation response to the injection.  This is called a \"cortisone flare\".   It will resolve over a couple days on its own.  Rest, gentle motion of the joint, Tylenol, NSAID's (ibuprofen, Aleve) and ice to the area are all helpful to minimize discomfort.  -Cortisone injections can be repeated anytime after 4 months if your pain returns but should only be repeated if you are symptomatic and not to be done for preventative reasons.     Follow up for repeat assessment and possible repeat injection if pain returns.     SUBJECTIVE  Angeli Jacobson is a/an 73 year old female who is seen in consultation at the request of  Noemí Ramesh C.N.P. for evaluation of right thumb pain. The patient is seen by themselves.    Onset: 3 week(s) ago. Reports insidious onset without acute precipitating event.  Patient was utilizing a cane in her right hand which may have caused increased pressure or irritation to her thumb with gripping but otherwise she has not done any activity to cause current condition.  She has noticed enlargement of the IP joint of both thumbs but she has no pain at this level and no history of recent injury.  Location of Pain: right thumb -MCP joint/thenar eminence on the volar surface  Rating of Pain at worst: 10/10 with use  Rating of Pain Currently: 0/10 at rest  Worsened by: pressure of the pad of the thumb, gripping, ABD of the thumb  Better with: none  Treatments tried: no treatment tried to date  Quality: sharp, stabbing  Associated symptoms: swelling, weakness of the hand with gripping, locking or catching, " and clicking of the PIP joint  Orthopedic history: NO  Relevant surgical history: NO  Social history: social history: retired    Past Medical History:   Diagnosis Date     * * * SBE PROPHYLAXIS * * * 1998    Amox 500mg, take 4 tabs one hour prior to procedure.Takes this because of lymphedema secondary from leg surgey     Antiplatelet or antithrombotic long-term use      Arrhythmia      Central serous retinopathy 2001    Resolved 9/2001     CHRONIC NECK PAIN 1995     Depressive disorder      Depressive disorder, not elsewhere classified 2001     Elevated coronary artery calcium score=7/1/21 08/03/2021     Elevated coronary artery calcium score=7/1/21 08/03/2021     History of blood transfusion 04/2019 12/2020    during left hip pinning, during surgery for patella     Lateral epicondylitis      MIXED HYPERLIPIDEMIA, LDL GOAL <160 1998    LDL goal < 160     Motion sickness      MYXOID LIPOSARCOMA 2000    Left thigh, S/P excision, radiation  at [facility]     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    CHRONIC LEFT THIGH LYMPHEDEMA     Nontoxic multinodular goiter 2005    needs yearly US     Osteoporosis, unspecified 2001     Other chronic pain     fx ribs left      Other lymphedema 2000    left thigh, gets regular PT for this     Overactive bladder      PAD (peripheral artery disease) (H24) 04/20/2018     PONV (postoperative nausea and vomiting)      SHINGLES 2001     Sprain of lumbosacral (joint) (ligament) 1995    right     Unspecified hearing loss 1998    chronic tinnitus     Unspecified tinnitus 1998     Social History     Socioeconomic History     Marital status:      Spouse name: Chris     Number of children: 3     Years of education: 14     Highest education level: Associate degree: academic program   Occupational History     Occupation: at home     Employer: NONE    Tobacco Use     Smoking status: Never     Passive exposure: Never     Smokeless tobacco: Never   Vaping Use     Vaping Use: Never used   Substance  and Sexual Activity     Alcohol use: Never     Drug use: Never     Sexual activity: Yes     Partners: Male     Birth control/protection: Male Surgical     Comment:  had a vasectomy   Other Topics Concern     Parent/sibling w/ CABG, MI or angioplasty before 65F 55M? No     Social Determinants of Health     Financial Resource Strain: Low Risk  (10/1/2023)    Financial Resource Strain      Within the past 12 months, have you or your family members you live with been unable to get utilities (heat, electricity) when it was really needed?: No   Food Insecurity: Low Risk  (10/1/2023)    Food Insecurity      Within the past 12 months, did you worry that your food would run out before you got money to buy more?: No      Within the past 12 months, did the food you bought just not last and you didn t have money to get more?: No   Transportation Needs: Low Risk  (10/1/2023)    Transportation Needs      Within the past 12 months, has lack of transportation kept you from medical appointments, getting your medicines, non-medical meetings or appointments, work, or from getting things that you need?: No   Physical Activity: Inactive (12/16/2021)    Exercise Vital Sign      Days of Exercise per Week: 0 days      Minutes of Exercise per Session: 0 min   Stress: Stress Concern Present (12/16/2021)    [employer] - Occupational Stress Questionnaire      Feeling of Stress : To some extent   Social Connections: Moderately Integrated (12/16/2021)    Social Connection and Isolation Panel [NHANES]      Frequency of Communication with Friends and Family: Never      Frequency of Social Gatherings with Friends and Family: Never      Attends [employer] Services: More than 4 times per year      Active Member of Clubs or Organizations: Yes      Marital Status:    Interpersonal Safety: Low Risk  (10/2/2023)    Interpersonal Safety      Do you feel physically and emotionally safe where you currently live?:  Yes      Within the past 12 months, have you been hit, slapped, kicked or otherwise physically hurt by someone?: No      Within the past 12 months, have you been humiliated or emotionally abused in other ways by your partner or ex-partner?: No   Housing Stability: Low Risk  (10/1/2023)    Housing Stability      Do you have housing? : Yes      Are you worried about losing your housing?: No         Patient's past medical, surgical, social, and family histories were personally reviewed today and no changes are noted.    REVIEW OF SYSTEMS:  10 point ROS is negative other than symptoms noted above in HPI, Past Medical History or as stated below  Constitutional: NEGATIVE for fever, chills, change in weight  Skin: NEGATIVE for worrisome rashes, moles or lesions  GI/: NEGATIVE for bowel or bladder changes  Neuro: NEGATIVE for weakness, dizziness or paresthesias    OBJECTIVE:  LMP  (LMP Unknown)    General: healthy, alert and in no distress  HEENT: no scleral icterus or conjunctival erythema  Skin: no suspicious lesions or rash. No jaundice.  CV: no pedal edema  Resp: normal respiratory effort without conversational dyspnea   Psych: normal mood and affect  Gait: normal steady gait with appropriate coordination and balance  Neuro: Normal light sensory exam of lower extremity      MSK:  [facility] shows a very pleasant 73-year-old female who ambulates full weightbearing without assistive device.  Slight antalgic gait.  Examination of the right hand shows diffuse osteoarthritic findings mainly of the IP joint of the thumbs as well as diffusely through the remaining hand joints.  No pain to palpation around the IP joint itself of the right thumb.  She has no john triggering or locking but does have stiffness of the finger specifically flexion.  She is point tender on the volar surface at the A1 pulley level on the flexor tendon sheath of the right thumb.  No triggering or locking noted of the remaining digits.  No thenar or  hypothenar atrophy.  No skin breakdown rashes or lesions.  Motor tone is slightly decreased secondary to pain with  and grasping with thumb opposition on the right hand compared to the left.  She is neurovascularly intact.  Ligament exam is stable of the thumb including the radial and ulnar collateral ligaments of the MCP joint.  Grind test of the thumb is negative without pain.  Finkelstein's test is negative for first dorsal compartment tenderness but grasping of the thumb into a flexed position generates pain on the volar aspect of the MCP joint of the thumb on the right side.      Independent visualization of the below image:  Previous x-rays of the right thumb from 10/2/2023 are personally reviewed showing arthritic findings of the IP joint with small chronic bony fragments.  I agree with below radiology interpretation.    XR FINGER RIGHT G/E 2 VIEWS   10/2/2023 8:36 AM      HISTORY: Pain of right thumb  COMPARISON: None.                                                                     IMPRESSION: Normal alignment. No acute fracture. Small chronic bone  fragment adjacent to the IP joint thumb, and mild degenerative  arthritis.     HALLEY VAZQUEZ MD    Small Joint Injection/Arthrocentesis: R thumb MCP    Date/Time: 10/11/2023 9:14 AM    Performed by: Saturnino Quinn PA-C  Authorized by: Saturnino Quinn PA-C  Indications:  Pain  Needle Size:  22 G  Guidance: landmark     Approach:  Volar  Location:  Thumb    Site:  R thumb MCP                    Medications:  10 mg triamcinolone 40 MG/ML; 0.5 mL lidocaine 1 %; 0.5 mL ROPivacaine 5 MG/ML        Outcome:  Tolerated well, no immediate complications  Procedure discussed: discussed risks, benefits, and alternatives    Consent Given by:  Patient  Timeout: timeout called immediately prior to procedure    Prep: patient was prepped and draped in usual sterile fashion            Patient's conditions were thoroughly discussed during today's visit with total  time spent face-to-face with the patient and documentation being 30 minutes.    Saturnino Quinn PA-C  [facility]      Again, thank you for allowing me to participate in the care of your patient.        Sincerely,        Saturnino Quinn PA-C

## 2023-10-13 NOTE — PROGRESS NOTES
[facility]    Hematology/Oncology Established Patient Note      Today's Date: 10/18/2023    Reason for follow-up:  Right breast cancer.    HISTORY OF PRESENT ILLNESS: Angeli Jacobson is a 73 year old female with history of left thigh liposarcoma status post excision in 2000 with subsequent left leg lymphedema and peripheral arterial disease status post redo left common femoral to popliteal artery bypass in January 2019, who presents with the following oncologic history:  1.  11/7/2022: Screening mammogram showed asymmetry in the right breast at 12:00, retroareolar far posterior.  Left breast negative.  2.  11/15/2022: Diagnostic right mammogram showed asymmetry in the posterior right breast, 8 cm from nipple.  Ultrasound of right breast at 12:00, 2 cm from nipple showed a small benign cyst but no definite sonographic correlate for the mammographic asymmetry.  3.  11/16/2022: Stereotactic guided right breast needle biopsy of 8 mm lesion at 3:00, 8 cm from the nipple showed grade 2 invasive ductal carcinoma, ER positive at %, NC positive at 60-70%, HER2 IHC = 2+ equivocal, HER2/compa FISH negative.  4. 12/14/2022: Underwent right breast lumpectomy with right axillary sentinel lymph node excision with Dr. Shelly Carr.  Pathology showed no residual invasive malignancy; 2 microscopic foci of DCIS, grade 2, largest measuring 1.2 mm; margins negative; total 2 right axillary lymph nodes negative.  5. 3/21/2023: Completed adjuvant radiation therapy to the right breast.   6. 4/10/2023: Started anastrozole. Discontinued 6/19/2023 due to weakness, joint stiffness, and brain fog.  7. 8/20/2023: Switched to letrozole.    INTERVAL HISTORY:  Angeli reports feeling well with no weakness or brain fog yet on letrozole. She does report pain in base of right thumb for which she received a corticosteroid injection at hand ortho a week ago.      REVIEW OF SYSTEMS:   14 point ROS was reviewed and is  negative other than as noted above in HPI.       HOME MEDICATIONS:  Current Outpatient Medications   Medication Sig Dispense Refill    calcium carbonate 600 mg-vitamin D 400 units (CALTRATE) 600-400 MG-UNIT per tablet Take 1 chew tab by mouth daily      clopidogrel (PLAVIX) 75 MG tablet Take 1 tablet (75 mg) by mouth daily 90 tablet 3    denosumab (PROLIA) 60 MG/ML SOSY injection       evolocumab (REPATHA) 140 MG/ML prefilled autoinjector Inject 1 mL (140 mg) Subcutaneous every 14 days 6 mL 3    ezetimibe (ZETIA) 10 MG tablet Take 1 tablet (10 mg) by mouth daily 90 tablet 3    levothyroxine (SYNTHROID/LEVOTHROID) 75 MCG tablet Take 1 tablet (75 mcg) by mouth daily 90 tablet 3    multivitamin, therapeutic (THERA-VIT) TABS tablet Take 1 tablet by mouth daily      nitroFURantoin macrocrystal-monohydrate (MACROBID) 100 MG capsule TAKE 1 CAPSULE BY MOUTH AFTER INTRCOURSE 30 capsule 3    rivaroxaban ANTICOAGULANT (XARELTO) 2.5 MG TABS tablet Take 1 tablet (2.5 mg) by mouth 2 times daily 180 tablet 1    rosuvastatin (CRESTOR) 20 MG tablet Take 1 tablet (20 mg) by mouth daily 90 tablet 3    solifenacin (VESICARE) 5 MG tablet Take 1 tablet (5 mg) by mouth daily 90 tablet 3         ALLERGIES:  Allergies   Allergen Reactions    Celexa [Citalopram Hydrobromide]      Decreased libido         PAST MEDICAL HISTORY:  Past Medical History:   Diagnosis Date    * * * SBE PROPHYLAXIS * * * 1998    Amox 500mg, take 4 tabs one hour prior to procedure.Takes this because of lymphedema secondary from leg surgey    Antiplatelet or antithrombotic long-term use     Arrhythmia     Central serous retinopathy 2001    Resolved 9/2001    CHRONIC NECK PAIN 1995    Depressive disorder     Depressive disorder, not elsewhere classified 2001    Elevated coronary artery calcium score=7/1/21 08/03/2021    Elevated coronary artery calcium score=7/1/21 08/03/2021    History of blood transfusion 04/2019 12/2020    during left hip pinning, during surgery  for patella    Lateral epicondylitis     MIXED HYPERLIPIDEMIA, LDL GOAL <160     LDL goal < 160    Motion sickness     MYXOID LIPOSARCOMA 2000    Left thigh, S/P excision, radiation  [facility]    Myxoid liposarcoma (HCC) 2004    CHRONIC LEFT THIGH LYMPHEDEMA    Nontoxic multinodular goiter 2005    needs yearly US    Osteoporosis, unspecified     Other chronic pain     fx ribs left     Other lymphedema 2000    left thigh, gets regular PT for this    Overactive bladder     PAD (peripheral artery disease) (H24) 2018    PONV (postoperative nausea and vomiting)     SHINGLES     Sprain of lumbosacral (joint) (ligament)     right    Unspecified hearing loss     chronic tinnitus    Unspecified tinnitus      Gynecologic history:  Age of menarche at 11, , age of first pregnancy at 29, 5 years of OCP use, no HRT or fertility treatment.    PAST SURGICAL HISTORY:  Past Surgical History:   Procedure Laterality Date    ARTHROPLASTY HIP Left 10/4/2019    Procedure: Removal of left femoral hardware with a conversion to left total hip arthroplasty using a Biomet Annalisa femoral stem with an OsseoTi acetabular shell and a dual mobility bearing surface;  Surgeon: Spencer Celeste MD;  Location:  OR    BIOPSY  2000    BIOPSY BREAST SEED LOCALIZATION Right 2022    Procedure: right breast tag localized lumpectomy;  Surgeon: Shelly Carr MD;  Location:  OR    BIOPSY NODE SENTINEL Right 2022    Procedure: with right sentinel lymph node biopsy;  Surgeon: Shelly Carr MD;  Location:  OR    BYPASS GRAFT FEMOROPOPLITEAL Left 2019    Procedure: LEFT FEMORAL TO ABOVE KNEE POPLITEAL  BYPASS WITH POLYTETRAFLUOROETHYLENE GRAFT;  Surgeon: Shade Owens MD;  Location:  OR    COLONOSCOPY N/A 2016    Procedure: COMBINED COLONOSCOPY, SINGLE OR MULTIPLE BIOPSY/POLYPECTOMY BY BIOPSY;  Surgeon: Varun Stanley MD, MD;  Location:  GI    COLONOSCOPY N/A  12/10/2021    Procedure: COLONOSCOPY, WITH POLYPECTOMIES  USING COLD  SNARE,   CLIP APPLIED X2;  Surgeon: Dayton Luna MD;  Location:  GI    CYSTOSCOPY      EXCISION MALIG LESION>1.25CM  5/2000    Myxoid Liposarcoma      EXPLORE GROIN Right 5/1/2018    Procedure: EXPLORE GROIN;  EMERGENCY RIGHT FEMORAL EXPLORATION WITH FEMORAL ARTERY REPAIR.    EBL: 50mL;  Surgeon: Shade Owens MD;  Location: [facility] OR    HC COLP CERVIX/UPPER VAGINA  07/1997    Negative    HC DILATION/CURETTAGE DIAG/THER NON OB  02/1997    Post menopausal bleeding on HRT, negative    HIP SURGERY Left 04/13/2019    IR ANGIOGRAM THROUGH CATHETER FOLLOW UP  12/20/2018    IR ANGIOGRAM THROUGH CATHETER FOLLOW UP  12/21/2018    IR LOWER EXTREMITY ANGIOGRAM LEFT  12/19/2018    OPEN REDUCTION INTERNAL FIXATION PATELLA Left 12/21/2020    Procedure: Left partial patella fracture excision with advancement of the quadriceps tendon;  Surgeon: Stephen Rhodes MD;  Location:  OR    OPEN REDUCTION INTERNAL FIXATION RODDING INTRAMEDULLARY TIBIA Left 12/21/2020    Procedure: Open reduction intramedullary nailing of left tibia fracture;  Surgeon: Stephen hRodes MD;  Location:  OR    REMOVE HARDWARE KNEE Left 5/31/2022    Procedure: Left knee patella hardware removal;  Surgeon: Spencer Celeste MD;  Location:  OR    REPAIR TENDON QUADRICEPS Left 7/28/2021    Procedure: Left knee quadricepsplasty with intraoperative patellar fracture requiring open reduction and internal fixation of the patella;  Surgeon: Spencer Celeste MD;  Location: [facility] OR    REPAIR TENDON QUADRICEPS Left 5/31/2022    Procedure: Open left knee VY quadricepsplasty with hardware removal and allograft;  Surgeon: Spencer Celeste MD;  Location:  OR    VASCULAR SURGERY  04/30/2018    Left SFA stent in bypass graft    ZZC APPENDECTOMY      Appendectomy    ZZC NONSPECIFIC PROCEDURE  04/2000    Open Biopsy Left Thigh Liposarcoma    ZZHC COLONOSCOPY THRU  STOMA, DIAGNOSTIC  2006    due 2010         SOCIAL HISTORY:  Social History     Socioeconomic History    Marital status:      Spouse name: Chris    Number of children: 3    Years of education: 14    Highest education level: Associate degree: academic program   Occupational History    Occupation: at home     Employer: NONE    Tobacco Use    Smoking status: Never     Passive exposure: Never    Smokeless tobacco: Never   Vaping Use    Vaping Use: Never used   Substance and Sexual Activity    Alcohol use: Never    Drug use: Never    Sexual activity: Yes     Partners: Male     Birth control/protection: Male Surgical     Comment:  had a vasectomy   Other Topics Concern    Parent/sibling w/ CABG, MI or angioplasty before 65F 55M? No   Social History Narrative    Not on file     Social Determinants of Health     Financial Resource Strain: Low Risk  (10/1/2023)    Financial Resource Strain     Within the past 12 months, have you or your family members you live with been unable to get utilities (heat, electricity) when it was really needed?: No   Food Insecurity: Low Risk  (10/1/2023)    Food Insecurity     Within the past 12 months, did you worry that your food would run out before you got money to buy more?: No     Within the past 12 months, did the food you bought just not last and you didn t have money to get more?: No   Transportation Needs: Low Risk  (10/1/2023)    Transportation Needs     Within the past 12 months, has lack of transportation kept you from medical appointments, getting your medicines, non-medical meetings or appointments, work, or from getting things that you need?: No   Physical Activity: Inactive (12/16/2021)    Exercise Vital Sign     Days of Exercise per Week: 0 days     Minutes of Exercise per Session: 0 min   Stress: Stress Concern Present (12/16/2021)    [employer] [place] [employer] - Occupational Stress Questionnaire     Feeling of Stress : To some extent   Social  "Connections: Moderately Integrated (2021)    Social Connection and Isolation Panel [NHANES]     Frequency of Communication with Friends and Family: Never     Frequency of Social Gatherings with Friends and Family: Never     Attends [employer] Services: More than 4 times per year     Active Member of Clubs or Organizations: Yes     Attends Club or Organization Meetings: Not on file     Marital Status:    Interpersonal Safety: Low Risk  (10/2/2023)    Interpersonal Safety     Do you feel physically and emotionally safe where you currently live?: Yes     Within the past 12 months, have you been hit, slapped, kicked or otherwise physically hurt by someone?: No     Within the past 12 months, have you been humiliated or emotionally abused in other ways by your partner or ex-partner?: No   Housing Stability: Low Risk  (10/1/2023)    Housing Stability     Do you have housing? : Yes     Are you worried about losing your housing?: No         FAMILY HISTORY:  Family History   Problem Relation Age of Onset    C.A.D. Father         MI 57    Alcohol/Drug Father         etoh    Coronary Artery Disease Father     Obesity Mother     Osteoporosis Mother     Colon Cancer Brother 70    Hyperlipidemia Son     Anxiety Disorder Son     Hyperlipidemia Son         very high, experimental drug    C.A.D. Paternal Grandmother         ascvd    Diabetes Maternal Grandmother     Cancer Maternal Grandmother     C.A.D. Paternal Uncle         Mi  age 48    Cancer Maternal Aunt         pancreatic CA    Hyperlipidemia Son     Hyperlipidemia Cousin      Brother with colon cancer.  Negative for breast, ovarian or prostate cancer.    PHYSICAL EXAM:  Vital signs:  /79   Pulse 78   Resp 16   Ht 1.664 m (5' 5.5\")   Wt 70.8 kg (156 lb)   LMP  (LMP Unknown)   SpO2 97%   BMI 25.56 kg/m     ECO  GENERAL/CONSTITUTIONAL: No acute distress.  EYES: No erythema or scleral icterus.  ENT/MOUTH: Neck supple.  LYMPH: No cervical, " supraclavicular, axillary adenopathy.   BREAST: No palpable masses in either breast. Nipples are everted bilaterally with no discharge. No erythema, ulceration, or dimpling of the skin.  RESPIRATORY: No audible cough or wheezing.   GASTROINTESTINAL: No hepatosplenomegaly, masses, or tenderness. No guarding.  No distention.  MUSCULOSKELETAL: Warm and well-perfused, no cyanosis, clubbing, or edema.  NEUROLOGIC: No focal motor deficits. Alert, oriented, answers questions appropriately.  INTEGUMENTARY: No rashes or jaundice.  GAIT: Steady, does not use assistive device    LABS:  CBC RESULTS:   Recent Labs   Lab Test 12/08/22  1735   WBC 5.6   RBC 4.51   HGB 13.5   HCT 42.5   MCV 94   MCH 29.9   MCHC 31.8   RDW 14.4         Last Comprehensive Metabolic Panel:  Sodium   Date Value Ref Range Status   10/11/2023 142 135 - 145 mmol/L Final     Comment:     Reference intervals for this test were updated on 09/26/2023 to more accurately reflect our healthy population. There may be differences in the flagging of prior results with similar values performed with this method. Interpretation of those prior results can be made in the context of the updated reference intervals.    05/17/2021 142 133 - 144 mmol/L Final     Potassium   Date Value Ref Range Status   10/11/2023 4.1 3.4 - 5.3 mmol/L Final   05/26/2022 3.9 3.4 - 5.3 mmol/L Final   05/17/2021 4.3 3.4 - 5.3 mmol/L Final     Chloride   Date Value Ref Range Status   10/11/2023 106 98 - 107 mmol/L Final   05/26/2022 106 94 - 109 mmol/L Final   05/17/2021 107 94 - 109 mmol/L Final     Carbon Dioxide   Date Value Ref Range Status   05/17/2021 32 20 - 32 mmol/L Final     Carbon Dioxide (CO2)   Date Value Ref Range Status   10/11/2023 26 22 - 29 mmol/L Final   05/26/2022 29 20 - 32 mmol/L Final     Anion Gap   Date Value Ref Range Status   10/11/2023 10 7 - 15 mmol/L Final   05/26/2022 4 3 - 14 mmol/L Final   05/17/2021 3 3 - 14 mmol/L Final     Glucose   Date Value Ref  Range Status   10/11/2023 89 70 - 99 mg/dL Final   06/01/2022 137 (H) 70 - 99 mg/dL Final   05/17/2021 78 70 - 99 mg/dL Final     Comment:     Fasting specimen     Urea Nitrogen   Date Value Ref Range Status   10/11/2023 14.1 8.0 - 23.0 mg/dL Final   05/26/2022 17 7 - 30 mg/dL Final   05/17/2021 13 7 - 30 mg/dL Final     Creatinine   Date Value Ref Range Status   10/11/2023 0.72 0.51 - 0.95 mg/dL Final   05/17/2021 0.75 0.52 - 1.04 mg/dL Final     GFR Estimate   Date Value Ref Range Status   10/11/2023 88 >60 mL/min/1.73m2 Final   05/17/2021 81 >60 mL/min/[1.73_m2] Final     Comment:     Non  GFR Calc  Starting 12/18/2018, serum creatinine based estimated GFR (eGFR) will be   calculated using the Chronic Kidney Disease Epidemiology Collaboration   (CKD-EPI) equation.       Calcium   Date Value Ref Range Status   10/11/2023 10.0 8.8 - 10.2 mg/dL Final   05/17/2021 9.3 8.5 - 10.1 mg/dL Final     Bilirubin Total   Date Value Ref Range Status   10/11/2023 0.4 <=1.2 mg/dL Final   05/17/2021 0.4 0.2 - 1.3 mg/dL Final     Alkaline Phosphatase   Date Value Ref Range Status   10/11/2023 68 35 - 104 U/L Final   05/17/2021 88 40 - 150 U/L Final     ALT   Date Value Ref Range Status   10/11/2023 19 0 - 50 U/L Final     Comment:     Reference intervals for this test were updated on 6/12/2023 to more accurately reflect our healthy population. There may be differences in the flagging of prior results with similar values performed with this method. Interpretation of those prior results can be made in the context of the updated reference intervals.     05/17/2021 26 0 - 50 U/L Final     AST   Date Value Ref Range Status   10/11/2023 33 0 - 45 U/L Final     Comment:     Reference intervals for this test were updated on 6/12/2023 to more accurately reflect our healthy population. There may be differences in the flagging of prior results with similar values performed with this method. Interpretation of those prior  results can be made in the context of the updated reference intervals.   05/17/2021 20 0 - 45 U/L Final       PATHOLOGY:  None new.    IMAGING:  Reviewed as per A/P.    ASSESSMENT/PLAN:  Angeli Jacobson is a 73 year old female with the following issues:  1. Pathologic prognostic stage IA, [id number], grade 2 invasive ductal carcinoma of the right upper outer breast, ER positive (%), NE positive (70%), HER2/compa FISH negative  -Angeli is s/p right lumpectomy and radiation completed 3/21/2023.    --She has no clinical evidence for recurrent breast cancer by physical exam.  --I advised a total of 5 years of hormone blockade therapy.  --Due to increase in mental and physical fatigue, she switched from anastrozole to letrozole on 8/30/2023.  --She is tolerating letrozole relatively well so far with some right thumb joint pain alleviated with steroid injection.  --Tamoxifen would be less optimal given concern for increased risk of blood clots.  --Due for next annual bilateral mammogram for 11/2023.    2.  History of myxoid liposarcoma of left thigh  - Status post excision and radiation completed in 2000.  She did not receive chemotherapy for her liposarcoma.  This has resulted in left lower extremity lymphedema.  - Continue lymphedema therapy and compression stockings.    3.  Peripheral arterial disease  -Has history of left lower extremity peripheral arterial disease and is status post femoral-popliteal bypass with later stent to bypass.  She is on aspirin and Plavix.    4. Osteoporosis  --History of taking alendronate, off for past year.  - DEXA scan from 12/22/2022 showed osteoporosis.  - Continue adequate calcium and vitamin D, denosumab/Prolia, next due 12/2024.    Return in 4 months.    Emilia Angulo MD  Hematology/Oncology  [facility]    Total time spent today: 30 minutes in chart review, patient evaluation, counseling, documentation, test and/or medication/prescription  orders, and coordination of care.

## 2023-10-17 DIAGNOSIS — E78.5 HYPERLIPIDEMIA LDL GOAL <70: ICD-10-CM

## 2023-10-17 RX ORDER — ROSUVASTATIN CALCIUM 20 MG/1
20 TABLET, COATED ORAL DAILY
Qty: 90 TABLET | Refills: 1 | Status: SHIPPED | OUTPATIENT
Start: 2023-10-17 | End: 2024-03-28

## 2023-10-17 NOTE — TELEPHONE ENCOUNTER
"Last Written Prescription Date:  9/20/22  Last Fill Quantity: 90,  # refills: 3   Last office visit: 4/4/2023 ; last virtual visit: Visit date not found with prescribing provider:     Future Office Visit:      Requested Prescriptions   Pending Prescriptions Disp Refills    rosuvastatin (CRESTOR) 20 MG tablet [Pharmacy Med Name: ROSUVASTATIN CALCIUM 20 MG TAB] 90 tablet 3     Sig: TAKE 1 TABLET BY MOUTH EVERY DAY       Statins Protocol Passed - 10/17/2023  1:56 AM        Passed - LDL on file in past 12 months     Recent Labs   Lab Test 10/11/23  0937   *             Passed - No abnormal creatine kinase in past 12 months     No lab results found.             Passed - Recent (12 mo) or future (30 days) visit within the authorizing provider's specialty     Patient has had an office visit with the authorizing provider or a provider within the authorizing providers department within the previous 12 mos or has a future within next 30 days. See \"Patient Info\" tab in inbasket, or \"Choose Columns\" in Meds & Orders section of the refill encounter.              Passed - Medication is active on med list        Passed - Patient is age 18 or older        Passed - No active pregnancy on record        Passed - No positive pregnancy test in past 12 months           Prescription approved per [facility] Refill Protocol.    Vika LARA RN    [facility] [place]  Office: [phone]  Fax: [phone]      "

## 2023-10-18 ENCOUNTER — ONCOLOGY VISIT (OUTPATIENT)
Dept: ONCOLOGY | Facility: CLINIC | Age: 73
End: 2023-10-18
Attending: INTERNAL MEDICINE
Payer: COMMERCIAL

## 2023-10-18 VITALS
RESPIRATION RATE: 16 BRPM | DIASTOLIC BLOOD PRESSURE: 79 MMHG | HEIGHT: 66 IN | OXYGEN SATURATION: 97 % | WEIGHT: 156 LBS | HEART RATE: 78 BPM | BODY MASS INDEX: 25.07 KG/M2 | SYSTOLIC BLOOD PRESSURE: 117 MMHG

## 2023-10-18 DIAGNOSIS — Z12.31 ENCOUNTER FOR SCREENING MAMMOGRAM FOR BREAST CANCER: ICD-10-CM

## 2023-10-18 DIAGNOSIS — Z17.0 MALIGNANT NEOPLASM OF UPPER-OUTER QUADRANT OF RIGHT BREAST IN FEMALE, ESTROGEN RECEPTOR POSITIVE (H): Primary | ICD-10-CM

## 2023-10-18 DIAGNOSIS — C50.411 MALIGNANT NEOPLASM OF UPPER-OUTER QUADRANT OF RIGHT BREAST IN FEMALE, ESTROGEN RECEPTOR POSITIVE (H): Primary | ICD-10-CM

## 2023-10-18 DIAGNOSIS — Z85.831 HISTORY OF SARCOMA OF SOFT TISSUE: ICD-10-CM

## 2023-10-18 DIAGNOSIS — M81.0 AGE-RELATED OSTEOPOROSIS WITHOUT CURRENT PATHOLOGICAL FRACTURE: ICD-10-CM

## 2023-10-18 PROCEDURE — 99214 OFFICE O/P EST MOD 30 MIN: CPT | Performed by: INTERNAL MEDICINE

## 2023-10-18 PROCEDURE — G0463 HOSPITAL OUTPT CLINIC VISIT: HCPCS | Performed by: INTERNAL MEDICINE

## 2023-10-18 ASSESSMENT — PAIN SCALES - GENERAL: PAINLEVEL: NO PAIN (0)

## 2023-10-18 NOTE — PROGRESS NOTES
"Oncology Rooming Note    October 18, 2023 9:57 AM   Angeli ABLA Kavon is a 73 year old female who presents for:    Chief Complaint   Patient presents with    Oncology Clinic Visit     Initial Vitals: Resp 16   Ht 1.664 m (5' 5.5\")   Wt 70.8 kg (156 lb)   LMP  (LMP Unknown)   BMI 25.56 kg/m   Estimated body mass index is 25.56 kg/m  as calculated from the following:    Height as of this encounter: 1.664 m (5' 5.5\").    Weight as of this encounter: 70.8 kg (156 lb). Body surface area is 1.81 meters squared.  No Pain (0) Comment: Data Unavailable   No LMP recorded (lmp unknown). Patient is postmenopausal.  Allergies reviewed: Yes  Medications reviewed: Yes    Medications: Medication refills not needed today.  Pharmacy name entered into [facility]:    [id number] - [place], [place] - [address] [id number] - [place], [place] - [address]            "

## 2023-10-18 NOTE — LETTER
10/18/2023         RE: Angeli Jacobson  [id number]  [facility] [address]        Dear Colleague,    Thank you for referring your patient, Angeli Jacobson, to the [facility]. Please see a copy of my visit note below.    [facility]    Hematology/Oncology Established Patient Note      Today's Date: 10/18/2023    Reason for follow-up:  Right breast cancer.    HISTORY OF PRESENT ILLNESS: Angeli Jacobson is a 73 year old female with history of left thigh liposarcoma status post excision in 2000 with subsequent left leg lymphedema and peripheral arterial disease status post redo left common femoral to popliteal artery bypass in January 2019, who presents with the following oncologic history:  1.  11/7/2022: Screening mammogram showed asymmetry in the right breast at 12:00, retroareolar far posterior.  Left breast negative.  2.  11/15/2022: Diagnostic right mammogram showed asymmetry in the posterior right breast, 8 cm from nipple.  Ultrasound of right breast at 12:00, 2 cm from nipple showed a small benign cyst but no definite sonographic correlate for the mammographic asymmetry.  3.  11/16/2022: Stereotactic guided right breast needle biopsy of 8 mm lesion at 3:00, 8 cm from the nipple showed grade 2 invasive ductal carcinoma, ER positive at %, MS positive at 60-70%, HER2 IHC = 2+ equivocal, HER2/compa FISH negative.  4. 12/14/2022: Underwent right breast lumpectomy with right axillary sentinel lymph node excision with Dr. Shelly Carr.  Pathology showed no residual invasive malignancy; 2 microscopic foci of DCIS, grade 2, largest measuring 1.2 mm; margins negative; total 2 right axillary lymph nodes negative.  5. 3/21/2023: Completed adjuvant radiation therapy to the right breast.   6. 4/10/2023: Started anastrozole. Discontinued 6/19/2023 due to weakness, joint stiffness, and brain fog.  7. 8/20/2023: Switched to letrozole.    INTERVAL  HISTORY:  Angeli reports feeling well with no weakness or brain fog yet on letrozole. She does report pain in base of right thumb for which she received a corticosteroid injection at hand ortho a week ago.      REVIEW OF SYSTEMS:   14 point ROS was reviewed and is negative other than as noted above in HPI.       HOME MEDICATIONS:  Current Outpatient Medications   Medication Sig Dispense Refill     calcium carbonate 600 mg-vitamin D 400 units (CALTRATE) 600-400 MG-UNIT per tablet Take 1 chew tab by mouth daily       clopidogrel (PLAVIX) 75 MG tablet Take 1 tablet (75 mg) by mouth daily 90 tablet 3     denosumab (PROLIA) 60 MG/ML SOSY injection        evolocumab (REPATHA) 140 MG/ML prefilled autoinjector Inject 1 mL (140 mg) Subcutaneous every 14 days 6 mL 3     ezetimibe (ZETIA) 10 MG tablet Take 1 tablet (10 mg) by mouth daily 90 tablet 3     levothyroxine (SYNTHROID/LEVOTHROID) 75 MCG tablet Take 1 tablet (75 mcg) by mouth daily 90 tablet 3     multivitamin, therapeutic (THERA-VIT) TABS tablet Take 1 tablet by mouth daily       nitroFURantoin macrocrystal-monohydrate (MACROBID) 100 MG capsule TAKE 1 CAPSULE BY MOUTH AFTER INTRCOURSE 30 capsule 3     rivaroxaban ANTICOAGULANT (XARELTO) 2.5 MG TABS tablet Take 1 tablet (2.5 mg) by mouth 2 times daily 180 tablet 1     rosuvastatin (CRESTOR) 20 MG tablet Take 1 tablet (20 mg) by mouth daily 90 tablet 3     solifenacin (VESICARE) 5 MG tablet Take 1 tablet (5 mg) by mouth daily 90 tablet 3         ALLERGIES:  Allergies   Allergen Reactions     Celexa [Citalopram Hydrobromide]      Decreased libido         PAST MEDICAL HISTORY:  Past Medical History:   Diagnosis Date     * * * SBE PROPHYLAXIS * * * 1998    Amox 500mg, take 4 tabs one hour prior to procedure.Takes this because of lymphedema secondary from leg surgey     Antiplatelet or antithrombotic long-term use      Arrhythmia      Central serous retinopathy 2001    Resolved 9/2001     CHRONIC NECK PAIN 1995      Depressive disorder      Depressive disorder, not elsewhere classified      Elevated coronary artery calcium score=2021     Elevated coronary artery calcium score=2021     History of blood transfusion 2019    during left hip pinning, during surgery for patella     Lateral epicondylitis      MIXED HYPERLIPIDEMIA, LDL GOAL <160     LDL goal < 160     Motion sickness      MYXOID LIPOSARCOMA 2000    Left thigh, S/P excision, radiation  at [facility]     Myxoid liposarcoma (HCC) 2004    CHRONIC LEFT THIGH LYMPHEDEMA     Nontoxic multinodular goiter 2005    needs yearly US     Osteoporosis, unspecified      Other chronic pain     fx ribs left      Other lymphedema     left thigh, gets regular PT for this     Overactive bladder      PAD (peripheral artery disease) (H24) 2018     PONV (postoperative nausea and vomiting)      SHINGLES      Sprain of lumbosacral (joint) (ligament)     right     Unspecified hearing loss     chronic tinnitus     Unspecified tinnitus      Gynecologic history:  Age of menarche at 11, , age of first pregnancy at 29, 5 years of OCP use, no HRT or fertility treatment.    PAST SURGICAL HISTORY:  Past Surgical History:   Procedure Laterality Date     ARTHROPLASTY HIP Left 10/4/2019    Procedure: Removal of left femoral hardware with a conversion to left total hip arthroplasty using a Biomet Annalisa femoral stem with an OsseoTi acetabular shell and a dual mobility bearing surface;  Surgeon: Spencer Celeste MD;  Location: [facility] OR     BIOPSY  2000     BIOPSY BREAST SEED LOCALIZATION Right 2022    Procedure: right breast tag localized lumpectomy;  Surgeon: Shelly Carr MD;  Location: [facility] OR     BIOPSY NODE SENTINEL Right 2022    Procedure: with right sentinel lymph node biopsy;  Surgeon: Shelly Carr MD;  Location: [facility] OR     BYPASS GRAFT FEMOROPOPLITEAL Left 2019    Procedure: LEFT  FEMORAL TO ABOVE KNEE POPLITEAL  BYPASS WITH POLYTETRAFLUOROETHYLENE GRAFT;  Surgeon: Shade Owens MD;  Location: [facility] OR     COLONOSCOPY N/A 2/5/2016    Procedure: COMBINED COLONOSCOPY, SINGLE OR MULTIPLE BIOPSY/POLYPECTOMY BY BIOPSY;  Surgeon: Varun Stanley MD, MD;  Location:  GI     COLONOSCOPY N/A 12/10/2021    Procedure: COLONOSCOPY, WITH POLYPECTOMIES  USING COLD  SNARE,   CLIP APPLIED X2;  Surgeon: Dayton Luna MD;  Location:  GI     CYSTOSCOPY       EXCISION MALIG LESION>1.25CM  5/2000    Myxoid Liposarcoma       EXPLORE GROIN Right 5/1/2018    Procedure: EXPLORE GROIN;  EMERGENCY RIGHT FEMORAL EXPLORATION WITH FEMORAL ARTERY REPAIR.    EBL: 50mL;  Surgeon: Shade Owens MD;  Location: [facility] OR     HC COLP CERVIX/UPPER VAGINA  07/1997    Negative     HC DILATION/CURETTAGE DIAG/THER NON OB  02/1997    Post menopausal bleeding on HRT, negative     HIP SURGERY Left 04/13/2019     IR ANGIOGRAM THROUGH CATHETER FOLLOW UP  12/20/2018     IR ANGIOGRAM THROUGH CATHETER FOLLOW UP  12/21/2018     IR LOWER EXTREMITY ANGIOGRAM LEFT  12/19/2018     OPEN REDUCTION INTERNAL FIXATION PATELLA Left 12/21/2020    Procedure: Left partial patella fracture excision with advancement of the quadriceps tendon;  Surgeon: Stephen Rhodes MD;  Location: [facility] OR     OPEN REDUCTION INTERNAL FIXATION RODDING INTRAMEDULLARY TIBIA Left 12/21/2020    Procedure: Open reduction intramedullary nailing of left tibia fracture;  Surgeon: Stephen Rhodes MD;  Location: [facility] OR     REMOVE HARDWARE KNEE Left 5/31/2022    Procedure: Left knee patella hardware removal;  Surgeon: Spencer Celeste MD;  Location:  OR     REPAIR TENDON QUADRICEPS Left 7/28/2021    Procedure: Left knee quadricepsplasty with intraoperative patellar fracture requiring open reduction and internal fixation of the patella;  Surgeon: Spencer Celeste MD;  Location: [facility] OR     REPAIR TENDON QUADRICEPS Left 5/31/2022    Procedure: Open left  knee VY quadricepsplasty with hardware removal and allograft;  Surgeon: Spencer Celeste MD;  Location: [facility] OR     VASCULAR SURGERY  04/30/2018    Left SFA stent in bypass graft     ZZC APPENDECTOMY      Appendectomy     ZZC NONSPECIFIC PROCEDURE  04/2000    Open Biopsy Left Thigh Liposarcoma     ZZHC COLONOSCOPY THRU STOMA, DIAGNOSTIC  2006    due 2010         SOCIAL HISTORY:  Social History     Socioeconomic History     Marital status:      Spouse name: Chris     Number of children: 3     Years of education: 14     Highest education level: Associate degree: academic program   Occupational History     Occupation: at home     Employer: NONE    Tobacco Use     Smoking status: Never     Passive exposure: Never     Smokeless tobacco: Never   Vaping Use     Vaping Use: Never used   Substance and Sexual Activity     Alcohol use: Never     Drug use: Never     Sexual activity: Yes     Partners: Male     Birth control/protection: Male Surgical     Comment:  had a vasectomy   Other Topics Concern     Parent/sibling w/ CABG, MI or angioplasty before 65F 55M? No   Social History Narrative     Not on file     Social Determinants of Health     Financial Resource Strain: Low Risk  (10/1/2023)    Financial Resource Strain      Within the past 12 months, have you or your family members you live with been unable to get utilities (heat, electricity) when it was really needed?: No   Food Insecurity: Low Risk  (10/1/2023)    Food Insecurity      Within the past 12 months, did you worry that your food would run out before you got money to buy more?: No      Within the past 12 months, did the food you bought just not last and you didn t have money to get more?: No   Transportation Needs: Low Risk  (10/1/2023)    Transportation Needs      Within the past 12 months, has lack of transportation kept you from medical appointments, getting your medicines, non-medical meetings or appointments, work, or from getting things  that you need?: No   Physical Activity: Inactive (2021)    Exercise Vital Sign      Days of Exercise per Week: 0 days      Minutes of Exercise per Session: 0 min   Stress: Stress Concern Present (2021)    [place] [employer] - Occupational Stress Questionnaire      Feeling of Stress : To some extent   Social Connections: Moderately Integrated (2021)    Social Connection and Isolation Panel [NHANES]      Frequency of Communication with Friends and Family: Never      Frequency of Social Gatherings with Friends and Family: Never      Attends Scientologist Services: More than 4 times per year      Active Member of Clubs or Organizations: Yes      Attends Club or Organization Meetings: Not on file      Marital Status:    Interpersonal Safety: Low Risk  (10/2/2023)    Interpersonal Safety      Do you feel physically and emotionally safe where you currently live?: Yes      Within the past 12 months, have you been hit, slapped, kicked or otherwise physically hurt by someone?: No      Within the past 12 months, have you been humiliated or emotionally abused in other ways by your partner or ex-partner?: No   Housing Stability: Low Risk  (10/1/2023)    Housing Stability      Do you have housing? : Yes      Are you worried about losing your housing?: No         FAMILY HISTORY:  Family History   Problem Relation Age of Onset     C.A.D. Father         MI 57     Alcohol/Drug Father         etoh     Coronary Artery Disease Father      Obesity Mother      Osteoporosis Mother      Colon Cancer Brother 70     Hyperlipidemia Son      Anxiety Disorder Son      Hyperlipidemia Son         very high, experimental drug     C.A.D. Paternal Grandmother         ascvd     Diabetes Maternal Grandmother      Cancer Maternal Grandmother      C.A.D. Paternal Uncle         Mi  age 48     Cancer Maternal Aunt         pancreatic CA     Hyperlipidemia Son      Hyperlipidemia Cousin      Brother with  "colon cancer.  Negative for breast, ovarian or prostate cancer.    PHYSICAL EXAM:  Vital signs:  /79   Pulse 78   Resp 16   Ht 1.664 m (5' 5.5\")   Wt 70.8 kg (156 lb)   LMP  (LMP Unknown)   SpO2 97%   BMI 25.56 kg/m     ECO  GENERAL/CONSTITUTIONAL: No acute distress.  EYES: No erythema or scleral icterus.  ENT/MOUTH: Neck supple.  LYMPH: No cervical, supraclavicular, axillary adenopathy.   BREAST: No palpable masses in either breast. Nipples are everted bilaterally with no discharge. No erythema, ulceration, or dimpling of the skin.  RESPIRATORY: No audible cough or wheezing.   GASTROINTESTINAL: No hepatosplenomegaly, masses, or tenderness. No guarding.  No distention.  MUSCULOSKELETAL: Warm and well-perfused, no cyanosis, clubbing, or edema.  NEUROLOGIC: No focal motor deficits. Alert, oriented, answers questions appropriately.  INTEGUMENTARY: No rashes or jaundice.  GAIT: Steady, does not use assistive device    LABS:  CBC RESULTS:   Recent Labs   Lab Test 22  1735   WBC 5.6   RBC 4.51   HGB 13.5   HCT 42.5   MCV 94   MCH 29.9   MCHC 31.8   RDW 14.4         Last Comprehensive Metabolic Panel:  Sodium   Date Value Ref Range Status   10/11/2023 142 135 - 145 mmol/L Final     Comment:     Reference intervals for this test were updated on 2023 to more accurately reflect our healthy population. There may be differences in the flagging of prior results with similar values performed with this method. Interpretation of those prior results can be made in the context of the updated reference intervals.    2021 142 133 - 144 mmol/L Final     Potassium   Date Value Ref Range Status   10/11/2023 4.1 3.4 - 5.3 mmol/L Final   2022 3.9 3.4 - 5.3 mmol/L Final   2021 4.3 3.4 - 5.3 mmol/L Final     Chloride   Date Value Ref Range Status   10/11/2023 106 98 - 107 mmol/L Final   2022 106 94 - 109 mmol/L Final   2021 107 94 - 109 mmol/L Final     Carbon Dioxide   Date " Value Ref Range Status   05/17/2021 32 20 - 32 mmol/L Final     Carbon Dioxide (CO2)   Date Value Ref Range Status   10/11/2023 26 22 - 29 mmol/L Final   05/26/2022 29 20 - 32 mmol/L Final     Anion Gap   Date Value Ref Range Status   10/11/2023 10 7 - 15 mmol/L Final   05/26/2022 4 3 - 14 mmol/L Final   05/17/2021 3 3 - 14 mmol/L Final     Glucose   Date Value Ref Range Status   10/11/2023 89 70 - 99 mg/dL Final   06/01/2022 137 (H) 70 - 99 mg/dL Final   05/17/2021 78 70 - 99 mg/dL Final     Comment:     Fasting specimen     Urea Nitrogen   Date Value Ref Range Status   10/11/2023 14.1 8.0 - 23.0 mg/dL Final   05/26/2022 17 7 - 30 mg/dL Final   05/17/2021 13 7 - 30 mg/dL Final     Creatinine   Date Value Ref Range Status   10/11/2023 0.72 0.51 - 0.95 mg/dL Final   05/17/2021 0.75 0.52 - 1.04 mg/dL Final     GFR Estimate   Date Value Ref Range Status   10/11/2023 88 >60 mL/min/1.73m2 Final   05/17/2021 81 >60 mL/min/[1.73_m2] Final     Comment:     Non  GFR Calc  Starting 12/18/2018, serum creatinine based estimated GFR (eGFR) will be   calculated using the Chronic Kidney Disease Epidemiology Collaboration   (CKD-EPI) equation.       Calcium   Date Value Ref Range Status   10/11/2023 10.0 8.8 - 10.2 mg/dL Final   05/17/2021 9.3 8.5 - 10.1 mg/dL Final     Bilirubin Total   Date Value Ref Range Status   10/11/2023 0.4 <=1.2 mg/dL Final   05/17/2021 0.4 0.2 - 1.3 mg/dL Final     Alkaline Phosphatase   Date Value Ref Range Status   10/11/2023 68 35 - 104 U/L Final   05/17/2021 88 40 - 150 U/L Final     ALT   Date Value Ref Range Status   10/11/2023 19 0 - 50 U/L Final     Comment:     Reference intervals for this test were updated on 6/12/2023 to more accurately reflect our healthy population. There may be differences in the flagging of prior results with similar values performed with this method. Interpretation of those prior results can be made in the context of the updated reference intervals.      05/17/2021 26 0 - 50 U/L Final     AST   Date Value Ref Range Status   10/11/2023 33 0 - 45 U/L Final     Comment:     Reference intervals for this test were updated on 6/12/2023 to more accurately reflect our healthy population. There may be differences in the flagging of prior results with similar values performed with this method. Interpretation of those prior results can be made in the context of the updated reference intervals.   05/17/2021 20 0 - 45 U/L Final       PATHOLOGY:  None new.    IMAGING:  Reviewed as per A/P.    ASSESSMENT/PLAN:  Angeli Jacobson is a 73 year old female with the following issues:  1. Pathologic prognostic stage IA, iQ0z-[id number], grade 2 invasive ductal carcinoma of the right upper outer breast, ER positive (%), AK positive (70%), HER2/compa FISH negative  -Angeli is s/p right lumpectomy and radiation completed 3/21/2023.    --She has no clinical evidence for recurrent breast cancer by physical exam.  --I advised a total of 5 years of hormone blockade therapy.  --Due to increase in mental and physical fatigue, she switched from anastrozole to letrozole on 8/30/2023.  --She is tolerating letrozole relatively well so far with some right thumb joint pain alleviated with steroid injection.  --Tamoxifen would be less optimal given concern for increased risk of blood clots.  --Due for next annual bilateral mammogram for 11/2023.    2.  History of myxoid liposarcoma of left thigh  - Status post excision and radiation completed in 2000.  She did not receive chemotherapy for her liposarcoma.  This has resulted in left lower extremity lymphedema.  - Continue lymphedema therapy and compression stockings.    3.  Peripheral arterial disease  -Has history of left lower extremity peripheral arterial disease and is status post femoral-popliteal bypass with later stent to bypass.  She is on aspirin and Plavix.    4. Osteoporosis  --History of taking alendronate, off for past year.  - DEXA  "scan from 12/22/2022 showed osteoporosis.  - Continue adequate calcium and vitamin D, denosumab/Prolia, next due 12/2024.    Return in 4 months.    Emilia Angulo MD  Hematology/Oncology  [facility]    Total time spent today: 30 minutes in chart review, patient evaluation, counseling, documentation, test and/or medication/prescription orders, and coordination of care.     Oncology Rooming Note    October 18, 2023 9:57 AM   Angeli Jacobson is a 73 year old female who presents for:    Chief Complaint   Patient presents with     Oncology Clinic Visit     Initial Vitals: Resp 16   Ht 1.664 m (5' 5.5\")   Wt 70.8 kg (156 lb)   LMP  (LMP Unknown)   BMI 25.56 kg/m   Estimated body mass index is 25.56 kg/m  as calculated from the following:    Height as of this encounter: 1.664 m (5' 5.5\").    Weight as of this encounter: 70.8 kg (156 lb). Body surface area is 1.81 meters squared.  No Pain (0) Comment: Data Unavailable   No LMP recorded (lmp unknown). Patient is postmenopausal.  Allergies reviewed: Yes  Medications reviewed: Yes    Medications: Medication refills not needed today.  Pharmacy name entered into [facility]:    [id number] - [place], [place] - [address] [id number] - [place], [place] - [address]              Again, thank you for allowing me to participate in the care of your patient.        Sincerely,        Emilia Angulo MD  "

## 2023-10-25 ENCOUNTER — VIRTUAL VISIT (OUTPATIENT)
Dept: OTHER | Facility: CLINIC | Age: 73
End: 2023-10-25
Attending: INTERNAL MEDICINE
Payer: COMMERCIAL

## 2023-10-25 DIAGNOSIS — E78.5 HYPERLIPIDEMIA LDL GOAL <70: ICD-10-CM

## 2023-10-25 DIAGNOSIS — Z98.890 HISTORY OF FEMOROPOPLITEAL BYPASS: ICD-10-CM

## 2023-10-25 DIAGNOSIS — I89.0 LYMPHEDEMA OF LEFT LEG: ICD-10-CM

## 2023-10-25 DIAGNOSIS — Z95.828 HISTORY OF ARTERIAL BYPASS OF LOWER EXTREMITY: Primary | ICD-10-CM

## 2023-10-25 DIAGNOSIS — E03.9 HYPOTHYROIDISM, UNSPECIFIED TYPE: ICD-10-CM

## 2023-10-25 PROCEDURE — 99214 OFFICE O/P EST MOD 30 MIN: CPT | Mod: 95 | Performed by: INTERNAL MEDICINE

## 2023-10-25 NOTE — PROGRESS NOTES
Angeli is a 73 year old who is being evaluated via a billable telephone visit.      What phone number would you like to be contacted at? [phone]  How would you like to obtain your AVS? Nica    Distant Location (provider location):  On-site      Objective           Vitals:  No vitals were obtained today due to virtual visit.    [facility]    Provider visit note:  Follow-up visit  Multiple issues   Review of recent labs and previous imaging studies   Med refills   Tolerating crestor, Zetia and PCSK 9 inhibitor   ?? Worse compared to before ??  Mr. Zepeda    Underwent  left leg surgery in May 2022  History of left lower extremity arterial bypass for PAD  She broke her left hip in April 2019 while in [place] underwent ORIF followed by left total hip replacement on October 4, 2019 at [facility]   History of breast cancer underwent Sx and XRT seeing Oncologist         History of present illness:   Angeli Jacobson is a 73 year old very pleasant female with past medical history of left thigh/groin sarcoma underwent surgery with lymphadenectomy and radiation therapy in the year 2000 at [facility] followed by she developed peripheral arterial disease underwent  Redo left common femoral to a bony popliteal artery bypass with 6 mm PTFE graft in January 2019 and since then reasonably doing well as for as the PAD concerned visited [place] in April and she fell down broke her left hip underwent ORIF over there.   She has a long-standing history of lymphedema after sarcoma surgery and radiation therapy.  Using compression stockings etc..  She was also evaluated extensively at lymphedema clinic and currently using pump  On October 4, 2019 she underwent left total hip replacement .  Postoperatively she was having back spasms due to positional discomfort and treated with Flexeril and they are better now.  She has been taking both Plavix and low-dose Xarelto 2.5 mg twice a day      She is able to walk approximately 10 blocks daily    Reviewed recent imaging studies, laboratory data in the epic  Wearing compression stockings    Review of systems: Reviewed all 12 point review of systems as per HPI otherwise unremarkable    Physical exam:( no physical exam done this is virtual visit)    Reviewed recent laboratory tests, imaging studies in the epic and updated chart    Assessment and plan:    A/P:    1. Hyperlipidemia LDL goal <70  ?? Missed Repatha   Repeat FLP in 6 weeks we will notify of results  Continue Repatha every other week and continue Crestor and Zetia same    2. Lymphedema of left leg  She was seen and evaluated recently lymphedema therapist continue the same plan elevate the leg,  lose weight use compression stockings and pump therapy     3. History of arterial bypass of Left lower extremity  Currently taking Plavix and low-dose Xarelto 2.5 mg twice a day tolerating without any problems and she is tolerating crestor 20 mg daily along with Zetia 10 mg daily and PCSK 9 inhibitor     Therapeutic lifestyle modification suggested    She is able to walk 10 blocks continue to walk as much as she can and follow the diet and exercise plan  ADRIAN looks good but Art duplex visual stenosis 2.8 mm ( previously 6.0 mm) seen and evaluated by Dr. Owens few months ago    4. Hypothyroidism, unspecified type  Clinically euthyroid and recent thyroid function tests are normal continue the same and take medication as advised    Phone visit start time: 4:53 PM  Phone visit end time: 5:15 PM.    Total 30 minutes spent on the date of the encounter doing chart review, history,documentation etc   she had  a lot of questions all of them were answered AVS with written instructions given        Copy of this note  to primary care provider.  AVS with written instructions given      This visit is being conducted as a virtual visit due to the emphasis on mitigation of the COVID-19 virus pandemic. The clinician has  decided that the risk of an in-office visit outweighs the benefit for this patient.       Tiesha Prince MD, JASON, ALANNA, [facility]  Vascular Medicine  Clinical Lipidologist

## 2023-10-25 NOTE — PATIENT INSTRUCTIONS
Fasting lipid labs in 6 weeks, order placed  , we will notify results    Continue current medications     Refilled Xarelto     Follow up in 6 months office visit

## 2023-10-30 ENCOUNTER — TELEPHONE (OUTPATIENT)
Dept: OTHER | Facility: CLINIC | Age: 73
End: 2023-10-30
Payer: COMMERCIAL

## 2023-10-30 DIAGNOSIS — E78.5 HYPERLIPIDEMIA LDL GOAL <70: ICD-10-CM

## 2023-10-30 DIAGNOSIS — E11.9 DIABETES MELLITUS (H): ICD-10-CM

## 2023-10-30 DIAGNOSIS — Z98.890 HISTORY OF FEMOROPOPLITEAL BYPASS: ICD-10-CM

## 2023-10-30 DIAGNOSIS — Z95.828 HISTORY OF ARTERIAL BYPASS OF LOWER EXTREMITY: Primary | ICD-10-CM

## 2023-10-30 DIAGNOSIS — I89.0 LYMPHEDEMA OF LEFT LEG: ICD-10-CM

## 2023-10-30 NOTE — TELEPHONE ENCOUNTER
Patient needs to be scheduled for fasting labs in 6 weeks- will notify of results.  Gold sheet filed for 6 month follow up    Vika LARA RN    [facility]  Office: [phone]  Fax: [phone]

## 2023-11-02 ENCOUNTER — PREP FOR PROCEDURE (OUTPATIENT)
Dept: ANESTHESIOLOGY | Facility: CLINIC | Age: 73
End: 2023-11-02
Payer: COMMERCIAL

## 2023-11-02 ENCOUNTER — TELEPHONE (OUTPATIENT)
Dept: PALLIATIVE MEDICINE | Facility: OTHER | Age: 73
End: 2023-11-02
Payer: COMMERCIAL

## 2023-11-02 DIAGNOSIS — M17.12 ARTHROPATHY OF LEFT KNEE: Primary | ICD-10-CM

## 2023-11-02 NOTE — TELEPHONE ENCOUNTER
Left voicemail for patient to schedule appointment(s), stated this our 2nd attempt at scheduling and provided [facility] telephone number for patient to call back to schedule.

## 2023-11-02 NOTE — TELEPHONE ENCOUNTER
Future Appointments   Date Time Provider Department Center   12/6/2023  8:45 AM CR LAB [facility] CR

## 2023-11-04 ENCOUNTER — HEALTH MAINTENANCE LETTER (OUTPATIENT)
Age: 73
End: 2023-11-04

## 2023-11-08 ENCOUNTER — TELEPHONE (OUTPATIENT)
Dept: SURGERY | Facility: CLINIC | Age: 73
End: 2023-11-08
Payer: COMMERCIAL

## 2023-11-08 NOTE — TELEPHONE ENCOUNTER
[facility] Call Center    Phone Message    May a detailed message be left on voicemail: yes     Reason for Call: Other: Imaging tech with [facility] is calling in asking for a call back. Tech states that they had received a request to have imaging pushed through to our clinic, but they do not have the ability and would like to discuss whether this can be sent as a disc via the postal service or whether they should use FedEx or UPS. Please call back as soon as possible to discuss.     Action Taken: Message routed to:  [facility] ([facility]): JUAN R    Travel Screening: Not Applicable

## 2023-11-09 ASSESSMENT — ACTIVITIES OF DAILY LIVING (ADL)
HEAVY_WORK: MODERATE DIFFICULTY
STANDING FOR 15 MINUTES: NO DIFFICULTY AT ALL
HOS_ADL_ITEM_SCORE_TOTAL: 42
WALKING_UP_STEEP_HILLS: SLIGHT DIFFICULTY
WALKING_DOWN_STEEP_HILLS: SLIGHT DIFFICULTY
HOW_WOULD_YOU_RATE_YOUR_CURRENT_LEVEL_OF_FUNCTION_DURING_YOUR_USUAL_ACTIVITIES_OF_DAILY_LIVING_FROM_0_TO_100_WITH_100_BEING_YOUR_LEVEL_OF_FUNCTION_PRIOR_TO_YOUR_HIP_PROBLEM_AND_0_BEING_THE_INABILITY_TO_PERFORM_ANY_OF_YOUR_USUAL_DAILY_ACTIVITIES?: 75
PUTTING ON SOCKS AND SHOES: SLIGHT DIFFICULTY
WALKING_APPROXIMATELY_10_MINUTES: SLIGHT DIFFICULTY
SITTING FOR 15 MINUTES: NO DIFFICULTY AT ALL
GETTING_INTO_AND_OUT_OF_A_BATHTUB: UNABLE TO DO
LIGHT_TO_MODERATE_WORK: SLIGHT DIFFICULTY
GOING DOWN 1 FLIGHT OF STAIRS: SLIGHT DIFFICULTY
HOS_ADL_HIGHEST_POTENTIAL_SCORE: 68
WALKING_INITIALLY: NO DIFFICULTY AT ALL
GOING UP 1 FLIGHT OF STAIRS: SLIGHT DIFFICULTY
GETTING INTO AND OUT OF AN AVERAGE CAR: SLIGHT DIFFICULTY
RECREATIONAL ACTIVITIES: UNABLE TO DO
DEEP SQUATTING: UNABLE TO DO
STEPPING UP AND DOWN CURBS: SLIGHT DIFFICULTY
TWISTING/PIVOTING ON INVOLVED LEG: SLIGHT DIFFICULTY
ROLLING OVER IN BED: MODERATE DIFFICULTY
HOS_ADL_SCORE(%): 61.76
WALKING_15_MINUTES_OR_GREATER: SLIGHT DIFFICULTY

## 2023-11-10 ENCOUNTER — TELEPHONE (OUTPATIENT)
Dept: PALLIATIVE MEDICINE | Facility: OTHER | Age: 73
End: 2023-11-10
Payer: COMMERCIAL

## 2023-11-15 ENCOUNTER — TELEPHONE (OUTPATIENT)
Dept: PALLIATIVE MEDICINE | Facility: OTHER | Age: 73
End: 2023-11-15
Payer: COMMERCIAL

## 2023-11-15 NOTE — TELEPHONE ENCOUNTER
Patient called back and stated she never saw dr. Sanchez and does not need pain injection for left knee.

## 2023-11-16 ENCOUNTER — THERAPY VISIT (OUTPATIENT)
Dept: PHYSICAL THERAPY | Facility: CLINIC | Age: 73
End: 2023-11-16
Attending: STUDENT IN AN ORGANIZED HEALTH CARE EDUCATION/TRAINING PROGRAM
Payer: COMMERCIAL

## 2023-11-16 DIAGNOSIS — M25.562 CHRONIC PAIN OF LEFT KNEE: Primary | ICD-10-CM

## 2023-11-16 DIAGNOSIS — C50.411 MALIGNANT NEOPLASM OF UPPER-OUTER QUADRANT OF RIGHT BREAST IN FEMALE, ESTROGEN RECEPTOR POSITIVE (H): ICD-10-CM

## 2023-11-16 DIAGNOSIS — I89.0 LYMPHEDEMA OF LEFT LEG: ICD-10-CM

## 2023-11-16 DIAGNOSIS — Z85.831 HISTORY OF SARCOMA OF SOFT TISSUE: ICD-10-CM

## 2023-11-16 DIAGNOSIS — Z17.0 MALIGNANT NEOPLASM OF UPPER-OUTER QUADRANT OF RIGHT BREAST IN FEMALE, ESTROGEN RECEPTOR POSITIVE (H): ICD-10-CM

## 2023-11-16 DIAGNOSIS — G89.29 CHRONIC PAIN OF LEFT KNEE: Primary | ICD-10-CM

## 2023-11-16 DIAGNOSIS — R53.81 PHYSICAL DECONDITIONING: ICD-10-CM

## 2023-11-16 PROCEDURE — 97161 PT EVAL LOW COMPLEX 20 MIN: CPT | Mod: GP | Performed by: PHYSICAL THERAPIST

## 2023-11-16 PROCEDURE — 97530 THERAPEUTIC ACTIVITIES: CPT | Mod: GP | Performed by: PHYSICAL THERAPIST

## 2023-11-16 PROCEDURE — 97110 THERAPEUTIC EXERCISES: CPT | Mod: GP | Performed by: PHYSICAL THERAPIST

## 2023-11-16 NOTE — PROGRESS NOTES
PHYSICAL THERAPY EVALUATION  Type of Visit: Evaluation    See electronic medical record for Abuse and Falls Screening details.    Subjective       Pt got order for PT to strengthen her LLE and reduce deconditioning. Pt with complex history of her LLE. L quad tendon repair x 2, ORIF tibia L, ORIF patella L, L GISSELL. Pt uses a quad cane in her RUE on uneven grounds, longer distances. Pt doesn't use it at home or in familiar places. Also helpful for energy conservation. Pt denies pain. Pt currently able to walk 2-3 blocks before needing to take a break. Denies regular exercise, gets LLE fatigue with activity. Pt uses daily compression on her L leg, has sensory changes in her foot and lower leg.     Presenting condition or subjective complaint: Dr Leeann Sharma ordered PT to strengthen hi, thigh muscles  Date of onset: 10/10/23 (MD order)    Relevant medical history:     Dates & types of surgery: 7 surgeries over the past 5 years    Prior diagnostic imaging/testing results: Other None of these   Prior therapy history for the same diagnosis, illness or injury: Yes 2022 PT with Pat at the [facility] location    Prior Level of Function  Transfers: Independent  Ambulation: Independent, Assistive equipment  ADL: Independent    Living Environment  Social support: With a significant other or spouse   Type of home: House   Stairs to enter the home: Yes 11 Is there a railing: Yes   Ramp:     Stairs inside the home: Yes 11 Is there a railing: Yes   Help at home: None  Equipment owned: Straight Cane     Employment: No    Hobbies/Interests: NA    Patient goals for therapy: Increase mobility time    Pain assessment: Pain denied     Objective   KNEE EVALUATION  INTEGUMENTARY (edema, incisions):  LLE edema, wears compression garment  POSTURE: WNL  GAIT:  Weightbearing Status: WBAT  Assistive Device(s): Cane (quad)  Gait Deviations: Stride length decreased  Charissa decreased  WEIGHTBEARING ALIGNMENT:  LLE shorter than RLE  ROM:   L knee ROM 0-0-85. L hip flexion to 90 degrees, limited rotation/abduction     STRENGTH:  2/5 L hip flexion, 4-/5 L knee extension, 3+/5 hamstrings L       Assessment & Plan   CLINICAL IMPRESSIONS  Medical Diagnosis: L knee weakness    Treatment Diagnosis: L knee weakness   Impression/Assessment: Patient is a 73 year old female with L knee and activity tolerance complaints.  The following significant findings have been identified: Decreased ROM/flexibility, Decreased joint mobility, Decreased strength, Decreased proprioception, Impaired muscle performance, and Decreased activity tolerance. These impairments interfere with their ability to perform self care tasks, recreational activities, household mobility, and community mobility as compared to previous level of function.     Clinical Decision Making (Complexity):  Clinical Presentation: Stable/Uncomplicated  Clinical Presentation Rationale: based on medical and personal factors listed in PT evaluation  Clinical Decision Making (Complexity): Low complexity    PLAN OF CARE  Treatment Interventions:  Interventions: Gait Training, Manual Therapy, Neuromuscular Re-education, Therapeutic Activity, Therapeutic Exercise    Long Term Goals     PT Goal 1  Goal Identifier: Activity tolerance  Goal Description: pt will be able to ambulate 30 minutes without resting  Rationale: to maximize safety and independence within the community  Target Date: 01/25/24      Frequency of Treatment: once per week  Duration of Treatment: 10 weeks    Recommended Referrals to Other Professionals:  NA  Education Assessment:   Learner/Method: Patient  Education Comments: Pt educated on PT role and plan of care    Risks and benefits of evaluation/treatment have been explained.   Patient/Family/caregiver agrees with Plan of Care.     Evaluation Time:     PT Eval, Low Complexity Minutes (06094): 20     Signing Clinician: Shahrzad López PT      [facility]                                                                                    OUTPATIENT PHYSICAL THERAPY      PLAN OF TREATMENT FOR OUTPATIENT REHABILITATION   Patient's Last Name, First Name, Angeli Pond YOB: 1950   Provider's Name   [facility]   Medical Record No.  [id number]     Onset Date: 10/10/23 (MD order)  Start of Care Date: 11/16/23     Medical Diagnosis:  L knee weakness      PT Treatment Diagnosis:  L knee weakness Plan of Treatment  Frequency/Duration: once per week/ 10 weeks    Certification date from 11/16/23 to 01/25/24         See note for plan of treatment details and functional goals     Shahrzad López, PT                         I CERTIFY THE NEED FOR THESE SERVICES FURNISHED UNDER        THIS PLAN OF TREATMENT AND WHILE UNDER MY CARE     (Physician attestation of this document indicates review and certification of the therapy plan).              Referring Provider:  Leeann Sharma    Initial Assessment  See Epic Evaluation- Start of Care Date: 11/16/23

## 2023-11-24 ENCOUNTER — THERAPY VISIT (OUTPATIENT)
Dept: PHYSICAL THERAPY | Facility: CLINIC | Age: 73
End: 2023-11-24
Attending: STUDENT IN AN ORGANIZED HEALTH CARE EDUCATION/TRAINING PROGRAM
Payer: COMMERCIAL

## 2023-11-24 DIAGNOSIS — R53.81 PHYSICAL DECONDITIONING: Primary | ICD-10-CM

## 2023-11-24 PROCEDURE — 97110 THERAPEUTIC EXERCISES: CPT | Mod: GP | Performed by: PHYSICAL THERAPIST

## 2023-11-25 ASSESSMENT — ENCOUNTER SYMPTOMS
POLYDIPSIA: 0
MYALGIAS: 1
RECTAL PAIN: 0
INCREASED ENERGY: 1
PANIC: 0
INSOMNIA: 0
BLOATING: 1
NERVOUS/ANXIOUS: 0
STIFFNESS: 0
FATIGUE: 1
VOMITING: 0
JAUNDICE: 0
NAUSEA: 0
JOINT SWELLING: 0
ALTERED TEMPERATURE REGULATION: 0
BLOOD IN STOOL: 0
WEIGHT LOSS: 0
CHILLS: 0
CONSTIPATION: 1
NIGHT SWEATS: 0
DECREASED CONCENTRATION: 0
ARTHRALGIAS: 0
FEVER: 0
ABDOMINAL PAIN: 0
DEPRESSION: 1
BACK PAIN: 1
POLYPHAGIA: 0
NECK PAIN: 0
MUSCLE WEAKNESS: 0
HEARTBURN: 1
WEIGHT GAIN: 1
DECREASED APPETITE: 0
HALLUCINATIONS: 0
BOWEL INCONTINENCE: 0
DIARRHEA: 0
MUSCLE CRAMPS: 0

## 2023-11-28 ENCOUNTER — HOSPITAL ENCOUNTER (OUTPATIENT)
Dept: MAMMOGRAPHY | Facility: CLINIC | Age: 73
Discharge: HOME | End: 2023-11-28
Attending: INTERNAL MEDICINE | Admitting: INTERNAL MEDICINE
Payer: COMMERCIAL

## 2023-11-28 DIAGNOSIS — Z17.0 MALIGNANT NEOPLASM OF UPPER-OUTER QUADRANT OF RIGHT BREAST IN FEMALE, ESTROGEN RECEPTOR POSITIVE (H): Primary | ICD-10-CM

## 2023-11-28 DIAGNOSIS — Z17.0 MALIGNANT NEOPLASM OF UPPER-OUTER QUADRANT OF RIGHT BREAST IN FEMALE, ESTROGEN RECEPTOR POSITIVE (H): ICD-10-CM

## 2023-11-28 DIAGNOSIS — Z12.31 ENCOUNTER FOR SCREENING MAMMOGRAM FOR BREAST CANCER: ICD-10-CM

## 2023-11-28 DIAGNOSIS — C50.411 MALIGNANT NEOPLASM OF UPPER-OUTER QUADRANT OF RIGHT BREAST IN FEMALE, ESTROGEN RECEPTOR POSITIVE (H): ICD-10-CM

## 2023-11-28 DIAGNOSIS — C50.411 MALIGNANT NEOPLASM OF UPPER-OUTER QUADRANT OF RIGHT BREAST IN FEMALE, ESTROGEN RECEPTOR POSITIVE (H): Primary | ICD-10-CM

## 2023-11-28 PROCEDURE — 77067 SCR MAMMO BI INCL CAD: CPT

## 2023-11-28 RX ORDER — LETROZOLE 2.5 MG/1
2.5 TABLET, FILM COATED ORAL DAILY
Qty: 90 TABLET | Refills: 3 | Status: SHIPPED | OUTPATIENT
Start: 2023-11-28

## 2023-11-29 ENCOUNTER — OFFICE VISIT (OUTPATIENT)
Dept: SURGERY | Facility: CLINIC | Age: 73
End: 2023-11-29
Attending: INTERNAL MEDICINE
Payer: COMMERCIAL

## 2023-11-29 ENCOUNTER — PRE VISIT (OUTPATIENT)
Dept: SURGERY | Facility: CLINIC | Age: 73
End: 2023-11-29

## 2023-11-29 VITALS
HEART RATE: 70 BPM | DIASTOLIC BLOOD PRESSURE: 79 MMHG | HEIGHT: 66 IN | SYSTOLIC BLOOD PRESSURE: 126 MMHG | BODY MASS INDEX: 25.34 KG/M2 | OXYGEN SATURATION: 97 % | WEIGHT: 157.7 LBS

## 2023-11-29 DIAGNOSIS — K59.09 CHRONIC CONSTIPATION: Primary | ICD-10-CM

## 2023-11-29 PROCEDURE — 99213 OFFICE O/P EST LOW 20 MIN: CPT | Performed by: NURSE PRACTITIONER

## 2023-11-29 ASSESSMENT — PAIN SCALES - GENERAL: PAINLEVEL: NO PAIN (0)

## 2023-11-29 NOTE — LETTER
"2023       RE: Angeli Jacobson  [id number]  [facility] [id number]       Dear Colleague,    Thank you for referring your patient, Angeli Jacobson, to the [facility]IC [place] at [facility]. Please see a copy of my visit note below.    Colon and Rectal Surgery Consult Clinic Note    Date: 2023     Referring provider:  Diana Gabriela [address],  [place] [address]     RE: Angeli Jacobson  : 1950  VISHAL: 2023    Angeli Jacobson is a very pleasant 73 year old female here for incomplete evacuation.    HPI:  Has irregularity with bowel movements. Has about 2-3 bowel movements a week. These are hard. If she takes colace or senokot this helps. Will have a small amount of stools that does not completely empty at times. Does not strain to pass a bowel movement. No vaginal splinting. This has been a chronic issues.  No anorectal surgeries in the past. Had three vaginal births with episiotomy with the first. No fecal incontinence.  Has never met with gastroenterology.  Colonoscopy 12/10/2021 with two tubular adenomas.  On Xarelto and Plavix for history of femoropliteal bypass and graft.    Physical Examination:  /79 (BP Location: Left arm, Patient Position: Sitting, Cuff Size: Adult Regular)   Pulse 70   Ht 5' 5.5\"   Wt 157 lb 11.2 oz   LMP  (LMP Unknown)   SpO2 97%   BMI 25.84 kg/m    General: alert, oriented, in no acute distress, sitting comfortably    Assessment/Plan: 73 year old female with chronic constipation. No change in bowel habits and no bleeding or concerning symptoms.   Start a daily fiber supplement such as Citrucel, Metamucil, or Benefiber. Start with once a day and slowly increase up to three times a day, if needed, over the next 4-6 weeks  Add Miralax in addition if needed. Can take a half a dose every other day or up to a full " dose three times a day. Titrate as needed.  If you still feel like you cannot completely evacuate despite having soft stools, return to clinic.  If still having constipation, meet with a gastroenterologist.    Medical history:  Past Medical History:   Diagnosis Date    * * * SBE PROPHYLAXIS * * * 1998    Amox 500mg, take 4 tabs one hour prior to procedure.Takes this because of lymphedema secondary from leg surgey    Antiplatelet or antithrombotic long-term use     Arrhythmia     Central serous retinopathy 2001    Resolved 9/2001    CHRONIC NECK PAIN 1995    Depressive disorder     Depressive disorder, not elsewhere classified 2001    Elevated coronary artery calcium score=7/1/21 08/03/2021    Elevated coronary artery calcium score=7/1/21 08/03/2021    History of blood transfusion 04/2019 12/2020    during left hip pinning, during surgery for patella    Lateral epicondylitis     MIXED HYPERLIPIDEMIA, LDL GOAL <160 1998    LDL goal < 160    Motion sickness     MYXOID LIPOSARCOMA 2000    Left thigh, S/P excision, radiation  at [facility]    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    CHRONIC LEFT THIGH LYMPHEDEMA    Nontoxic multinodular goiter 2005    needs yearly US    Osteoporosis, unspecified 2001    Other chronic pain     fx ribs left     Other lymphedema 2000    left thigh, gets regular PT for this    Overactive bladder     PAD (peripheral artery disease) (H24) 04/20/2018    PONV (postoperative nausea and vomiting)     SHINGLES 2001    Sprain of lumbosacral (joint) (ligament) 1995    right    Unspecified hearing loss 1998    chronic tinnitus    Unspecified tinnitus 1998       Surgical history:  Past Surgical History:   Procedure Laterality Date    ARTHROPLASTY HIP Left 10/4/2019    Procedure: Removal of left femoral hardware with a conversion to left total hip arthroplasty using a Biomet Annalisa femoral stem with an OsseoTi acetabular shell and a dual mobility bearing surface;  Surgeon: Spencer Celeste MD;   Location: [facility] OR    BIOPSY  April 2000    BIOPSY BREAST SEED LOCALIZATION Right 12/14/2022    Procedure: right breast tag localized lumpectomy;  Surgeon: Shelly Carr MD;  Location:  OR    BIOPSY NODE SENTINEL Right 12/14/2022    Procedure: with right sentinel lymph node biopsy;  Surgeon: Shelly Carr MD;  Location:  OR    BYPASS GRAFT FEMOROPOPLITEAL Left 1/21/2019    Procedure: LEFT FEMORAL TO ABOVE KNEE POPLITEAL  BYPASS WITH POLYTETRAFLUOROETHYLENE GRAFT;  Surgeon: Shade Owens MD;  Location:  OR    COLONOSCOPY N/A 2/5/2016    Procedure: COMBINED COLONOSCOPY, SINGLE OR MULTIPLE BIOPSY/POLYPECTOMY BY BIOPSY;  Surgeon: Varun Stanley MD, MD;  Location:  GI    COLONOSCOPY N/A 12/10/2021    Procedure: COLONOSCOPY, WITH POLYPECTOMIES  USING COLD  SNARE,   CLIP APPLIED X2;  Surgeon: Dayton Luna MD;  Location:  GI    CYSTOSCOPY      EXCISION MALIG LESION>1.25CM  5/2000    Myxoid Liposarcoma      EXPLORE GROIN Right 5/1/2018    Procedure: EXPLORE GROIN;  EMERGENCY RIGHT FEMORAL EXPLORATION WITH FEMORAL ARTERY REPAIR.    EBL: 50mL;  Surgeon: Shade Owens MD;  Location:  OR    HC COLP CERVIX/UPPER VAGINA  07/1997    Negative    HC DILATION/CURETTAGE DIAG/THER NON OB  02/1997    Post menopausal bleeding on HRT, negative    HIP SURGERY Left 04/13/2019    IR ANGIOGRAM THROUGH CATHETER FOLLOW UP  12/20/2018    IR ANGIOGRAM THROUGH CATHETER FOLLOW UP  12/21/2018    IR LOWER EXTREMITY ANGIOGRAM LEFT  12/19/2018    OPEN REDUCTION INTERNAL FIXATION PATELLA Left 12/21/2020    Procedure: Left partial patella fracture excision with advancement of the quadriceps tendon;  Surgeon: Stephen Rhodes MD;  Location:  OR    OPEN REDUCTION INTERNAL FIXATION RODDING INTRAMEDULLARY TIBIA Left 12/21/2020    Procedure: Open reduction intramedullary nailing of left tibia fracture;  Surgeon: Stephen Rhodes MD;  Location:  OR    REMOVE HARDWARE KNEE Left 5/31/2022    Procedure: Left knee patella  hardware removal;  Surgeon: Spencer Celeste MD;  Location: [facility] OR    REPAIR TENDON QUADRICEPS Left 7/28/2021    Procedure: Left knee quadricepsplasty with intraoperative patellar fracture requiring open reduction and internal fixation of the patella;  Surgeon: Spencer Celeste MD;  Location: [facility] OR    REPAIR TENDON QUADRICEPS Left 5/31/2022    Procedure: Open left knee VY quadricepsplasty with hardware removal and allograft;  Surgeon: Spencer Celeste MD;  Location: [facility] OR    VASCULAR SURGERY  04/30/2018    Left SFA stent in bypass graft    Z APPENDECTOMY      Appendectomy    Z NONSPECIFIC PROCEDURE  04/2000    Open Biopsy Left Thigh Liposarcoma    ZZ COLONOSCOPY THRU STOMA, DIAGNOSTIC  2006    due 2010       Problem list:    Patient Active Problem List    Diagnosis Date Noted    Left knee pain 11/16/2023     Priority: Medium    Physical deconditioning 11/16/2023     Priority: Medium    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 , 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 - s/p lumpectomy + radiation 01/05/2023     Priority: Medium     Complete prior to 10.16 Angulo      Hypothyroidism, unspecified type 12/08/2022     Priority: Medium    Arthrofibrosis of knee joint, left 05/31/2022     Priority: Medium    Elevated coronary artery calcium score=7/1/21 08/03/2021     Priority: Medium    Other specified injury of left quadriceps muscle, fascia and tendon, initial encounter 07/28/2021     Priority: Medium    Closed fracture of left tibia and fibula, initial encounter 12/19/2020     Priority: Medium    Closed displaced fracture of left patella, unspecified fracture morphology, initial encounter 12/19/2020     Priority: Medium    Acute pain of left knee 10/29/2019     Priority: Medium    S/P total hip arthroplasty 10/04/2019     Priority: Medium    Myalgia of pelvic floor 09/11/2019     Priority: Medium    Lesion of bladder 09/11/2019     Priority: Medium    Postural urinary  incontinence 07/17/2019     Priority: Medium    Personal history of urinary tract infection 07/17/2019     Priority: Medium    OAB (overactive bladder) 07/17/2019     Priority: Medium    S/P ORIF (open reduction internal fixation) fracture 05/02/2019     Priority: Medium    Hip pain, left 05/02/2019     Priority: Medium    History of sarcoma 01/21/2019     Priority: Medium    Femoral-popliteal bypass graft occlusion, left (H24) 12/19/2018     Priority: Medium    Vascular graft occlusion (H24) 04/30/2018     Priority: Medium    PAD (peripheral artery disease) (H24) 04/20/2018     Priority: Medium    Vertigo 06/27/2017     Priority: Medium    Tubular adenoma 02/09/2016     Priority: Medium    Lymphedema of left leg 10/14/2014     Priority: Medium     Due to cancer      Osteoporosis 06/19/2008     Priority: Medium     Problem list name updated by automated process. 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     Priority: Medium     CHRONIC LEFT THIGH LYMPHEDEMA      Impaired fasting glucose 06/16/2010     Priority: Low    Hearing loss 01/29/2007     Priority: Low     Has hearing aids         Medications:  Current Outpatient Medications   Medication Sig Dispense Refill    calcium carbonate 600 mg-vitamin D 400 units (CALTRATE) 600-400 MG-UNIT per tablet Take 1 chew tab by mouth daily      clopidogrel (PLAVIX) 75 MG tablet Take 1 tablet (75 mg) by mouth daily 90 tablet 3    denosumab (PROLIA) 60 MG/ML SOSY injection        evolocumab (REPATHA) 140 MG/ML prefilled autoinjector Inject 1 mL (140 mg) Subcutaneous every 14 days 6 mL 3    ezetimibe (ZETIA) 10 MG tablet Take 1 tablet (10 mg) by mouth daily 90 tablet 3    letrozole (FEMARA) 2.5 MG tablet Take 1 tablet (2.5 mg) by mouth daily 90 tablet 3    levothyroxine (SYNTHROID/LEVOTHROID) 75 MCG tablet Take 1 tablet (75 mcg) by mouth daily 90 tablet 3    multivitamin, therapeutic (THERA-VIT) TABS tablet Take 1 tablet by mouth daily      nitroFURantoin macrocrystal-monohydrate (MACROBID) 100 MG capsule TAKE 1 CAPSULE BY MOUTH AFTER INTRCOURSE 30 capsule 3    rivaroxaban ANTICOAGULANT (XARELTO) 2.5 MG TABS tablet Take 1 tablet (2.5 mg) by mouth 2 times daily 180 tablet 3    rosuvastatin (CRESTOR) 20 MG tablet TAKE 1 TABLET BY MOUTH EVERY DAY 90 tablet 1    solifenacin (VESICARE) 5 MG tablet Take 1 tablet (5 mg) by mouth daily 90 tablet 3       Allergies:  Allergies   Allergen Reactions    Celexa [Citalopram Hydrobromide]      Decreased libido       Family history:  Family History   Problem Relation Age of Onset    C.A.D. Father         MI 57    Alcohol/Drug Father         etoh    Coronary Artery Disease Father     Obesity Mother     Osteoporosis Mother     Colon Cancer Brother 70    Hyperlipidemia Son     Anxiety Disorder Son     Hyperlipidemia Son         very high, experimental drug    C.A.D. Paternal Grandmother         ascvd    Diabetes Maternal Grandmother     Cancer Maternal Grandmother     C.A.D. Paternal Uncle         Mi  age 48    Cancer Maternal Aunt         pancreatic CA    Hyperlipidemia Son     Hyperlipidemia Cousin        Social history:  Social History     Tobacco Use    Smoking status: Never     Passive exposure: Never    Smokeless tobacco: Never   Substance Use Topics    Alcohol use: Never    Marital status: .    Nursing Notes:   Marci Maravilla  2023 11:07 AM  Signed  Chief Complaint   Patient presents with    New Patient     Incomplete evacuation  "      Vitals:    11/29/23 1105   BP: 126/79   BP Location: Left arm   Patient Position: Sitting   Cuff Size: Adult Regular   Pulse: 70   SpO2: 97%   Weight: 157 lb 11.2 oz   Height: 5' 5.5\"       Body mass index is 25.84 kg/m .    Marci Maravilla CMA       20 minutes spent on the date of encounter performing chart review, history and exam, documentation and further activities as noted above      This note was created using speech recognition software and may contain unintended word substitutions.          Again, thank you for allowing me to participate in the care of your patient.      Sincerely,    ELVA Coombs CNP    "

## 2023-11-29 NOTE — PROGRESS NOTES
"Colon and Rectal Surgery Consult Clinic Note    Date: 2023     Referring provider:  Edel Mccarty MD  [address] [place] [address]     RE: Angeli Jacobson  : 1950  VISHAL: 2023    Angeli Jacobson is a very pleasant 73 year old female here for incomplete evacuation.    HPI:  Has irregularity with bowel movements. Has about 2-3 bowel movements a week. These are hard. If she takes colace or senokot this helps. Will have a small amount of stools that does not completely empty at times. Does not strain to pass a bowel movement. No vaginal splinting. This has been a chronic issues.  No anorectal surgeries in the past. Had three vaginal births with episiotomy with the first. No fecal incontinence.  Has never met with gastroenterology.  Colonoscopy 12/10/2021 with two tubular adenomas.  On Xarelto and Plavix for history of femoropliteal bypass and graft.    Physical Examination:  /79 (BP Location: Left arm, Patient Position: Sitting, Cuff Size: Adult Regular)   Pulse 70   Ht 5' 5.5\"   Wt 157 lb 11.2 oz   LMP  (LMP Unknown)   SpO2 97%   BMI 25.84 kg/m    General: alert, oriented, in no acute distress, sitting comfortably    Assessment/Plan: 73 year old female with chronic constipation. No change in bowel habits and no bleeding or concerning symptoms.   Start a daily fiber supplement such as Citrucel, Metamucil, or Benefiber. Start with once a day and slowly increase up to three times a day, if needed, over the next 4-6 weeks  Add Miralax in addition if needed. Can take a half a dose every other day or up to a full dose three times a day. Titrate as needed.  If you still feel like you cannot completely evacuate despite having soft stools, return to clinic.  If still having constipation, meet with a gastroenterologist.    Medical history:  Past Medical History:   Diagnosis Date    * * * SBE PROPHYLAXIS * * *     Amox 500mg, take 4 tabs one hour prior to " procedure.Takes this because of lymphedema secondary from leg surgey    Antiplatelet or antithrombotic long-term use     Arrhythmia     Central serous retinopathy 2001    Resolved 9/2001    CHRONIC NECK PAIN 1995    Depressive disorder     Depressive disorder, not elsewhere classified 2001    Elevated coronary artery calcium score=7/1/21 08/03/2021    Elevated coronary artery calcium score=7/1/21 08/03/2021    History of blood transfusion 04/2019 12/2020    during left hip pinning, during surgery for patella    Lateral epicondylitis     MIXED HYPERLIPIDEMIA, LDL GOAL <160 1998    LDL goal < 160    Motion sickness     MYXOID LIPOSARCOMA 2000    Left thigh, S/P excision, radiation  at [facility]    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    CHRONIC LEFT THIGH LYMPHEDEMA    Nontoxic multinodular goiter 2005    needs yearly US    Osteoporosis, unspecified 2001    Other chronic pain     fx ribs left     Other lymphedema 2000    left thigh, gets regular PT for this    Overactive bladder     PAD (peripheral artery disease) (H24) 04/20/2018    PONV (postoperative nausea and vomiting)     SHINGLES 2001    Sprain of lumbosacral (joint) (ligament) 1995    right    Unspecified hearing loss 1998    chronic tinnitus    Unspecified tinnitus 1998       Surgical history:  Past Surgical History:   Procedure Laterality Date    ARTHROPLASTY HIP Left 10/4/2019    Procedure: Removal of left femoral hardware with a conversion to left total hip arthroplasty using a Biomet Annalisa femoral stem with an OsseoTi acetabular shell and a dual mobility bearing surface;  Surgeon: Spencer Celeste MD;  Location: [facility] OR    BIOPSY  April 2000    BIOPSY BREAST SEED LOCALIZATION Right 12/14/2022    Procedure: right breast tag localized lumpectomy;  Surgeon: Shelly Carr MD;  Location:  OR    BIOPSY NODE SENTINEL Right 12/14/2022    Procedure: with right sentinel lymph node biopsy;  Surgeon: Shelly Carr MD;  Location:  OR    BYPASS GRAFT  FEMOROPOPLITEAL Left 1/21/2019    Procedure: LEFT FEMORAL TO ABOVE KNEE POPLITEAL  BYPASS WITH POLYTETRAFLUOROETHYLENE GRAFT;  Surgeon: Shade Owens MD;  Location:  OR    COLONOSCOPY N/A 2/5/2016    Procedure: COMBINED COLONOSCOPY, SINGLE OR MULTIPLE BIOPSY/POLYPECTOMY BY BIOPSY;  Surgeon: Varun Stanley MD, MD;  Location:  GI    COLONOSCOPY N/A 12/10/2021    Procedure: COLONOSCOPY, WITH POLYPECTOMIES  USING COLD  SNARE,   CLIP APPLIED X2;  Surgeon: Dayton Luna MD;  Location:  GI    CYSTOSCOPY      EXCISION MALIG LESION>1.25CM  5/2000    Myxoid Liposarcoma      EXPLORE GROIN Right 5/1/2018    Procedure: EXPLORE GROIN;  EMERGENCY RIGHT FEMORAL EXPLORATION WITH FEMORAL ARTERY REPAIR.    EBL: 50mL;  Surgeon: Shade Owens MD;  Location: [facility] OR    HC COLP CERVIX/UPPER VAGINA  07/1997    Negative    HC DILATION/CURETTAGE DIAG/THER NON OB  02/1997    Post menopausal bleeding on HRT, negative    HIP SURGERY Left 04/13/2019    IR ANGIOGRAM THROUGH CATHETER FOLLOW UP  12/20/2018    IR ANGIOGRAM THROUGH CATHETER FOLLOW UP  12/21/2018    IR LOWER EXTREMITY ANGIOGRAM LEFT  12/19/2018    OPEN REDUCTION INTERNAL FIXATION PATELLA Left 12/21/2020    Procedure: Left partial patella fracture excision with advancement of the quadriceps tendon;  Surgeon: Stephen Rhodes MD;  Location:  OR    OPEN REDUCTION INTERNAL FIXATION RODDING INTRAMEDULLARY TIBIA Left 12/21/2020    Procedure: Open reduction intramedullary nailing of left tibia fracture;  Surgeon: Stephen Rhodes MD;  Location:  OR    REMOVE HARDWARE KNEE Left 5/31/2022    Procedure: Left knee patella hardware removal;  Surgeon: Spencer Celeste MD;  Location:  OR    REPAIR TENDON QUADRICEPS Left 7/28/2021    Procedure: Left knee quadricepsplasty with intraoperative patellar fracture requiring open reduction and internal fixation of the patella;  Surgeon: Spencer Celeste MD;  Location: [facility] OR    REPAIR TENDON QUADRICEPS Left  5/31/2022    Procedure: Open left knee VY quadricepsplasty with hardware removal and allograft;  Surgeon: Spencer Celeste MD;  Location: [facility] OR    VASCULAR SURGERY  04/30/2018    Left SFA stent in bypass graft    ZC APPENDECTOMY      Appendectomy    [facility]C NONSPECIFIC PROCEDURE  04/2000    Open Biopsy Left Thigh Liposarcoma    ZZ COLONOSCOPY THRU STOMA, DIAGNOSTIC  2006    due 2010       Problem list:    Patient Active Problem List    Diagnosis Date Noted    Left knee pain 11/16/2023     Priority: Medium    Physical deconditioning 11/16/2023     Priority: Medium    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 , 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 - s/p lumpectomy + radiation 01/05/2023     Priority: Medium     Complete prior to 10.16 Angulo      Hypothyroidism, unspecified type 12/08/2022     Priority: Medium    Arthrofibrosis of knee joint, left 05/31/2022     Priority: Medium    Elevated coronary artery calcium score=7/1/21 08/03/2021     Priority: Medium    Other specified injury of left quadriceps muscle, fascia and tendon, initial encounter 07/28/2021     Priority: Medium    Closed fracture of left tibia and fibula, initial encounter 12/19/2020     Priority: Medium    Closed displaced fracture of left patella, unspecified fracture morphology, initial encounter 12/19/2020     Priority: Medium    Acute pain of left knee 10/29/2019     Priority: Medium    S/P total hip arthroplasty 10/04/2019     Priority: Medium    Myalgia of pelvic floor 09/11/2019     Priority: Medium    Lesion of bladder 09/11/2019     Priority: Medium    Postural urinary incontinence 07/17/2019     Priority: Medium    Personal history of urinary tract infection 07/17/2019     Priority: Medium    OAB (overactive bladder) 07/17/2019     Priority: Medium    S/P ORIF (open reduction internal fixation) fracture 05/02/2019     Priority: Medium    Hip pain, left 05/02/2019     Priority: Medium    History of sarcoma 01/21/2019      Priority: Medium    Femoral-popliteal bypass graft occlusion, left (H24) 12/19/2018     Priority: Medium    Vascular graft occlusion (H24) 04/30/2018     Priority: Medium    PAD (peripheral artery disease) (H24) 04/20/2018     Priority: Medium    Vertigo 06/27/2017     Priority: Medium    Tubular adenoma 02/09/2016     Priority: Medium    Lymphedema of left leg 10/14/2014     Priority: Medium     Due to cancer      Osteoporosis 06/19/2008     Priority: Medium     Problem list name updated by automated process. 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     Priority: Medium     CHRONIC LEFT THIGH LYMPHEDEMA      Impaired fasting glucose 06/16/2010     Priority: Low    Hearing loss 01/29/2007     Priority: Low     Has hearing aids         Medications:  Current Outpatient Medications   Medication Sig Dispense Refill    calcium carbonate 600 mg-vitamin D 400 units (CALTRATE) 600-400 MG-UNIT per tablet Take 1 chew tab by mouth daily      clopidogrel (PLAVIX) 75 MG tablet Take 1 tablet (75 mg) by mouth daily 90 tablet 3    denosumab (PROLIA) 60 MG/ML SOSY injection       evolocumab (REPATHA) 140 MG/ML prefilled autoinjector Inject 1 mL (140 mg) Subcutaneous every 14 days 6 mL 3    ezetimibe (ZETIA) 10 MG tablet Take 1 tablet (10 mg) by mouth daily 90 tablet 3    letrozole (FEMARA) 2.5 MG tablet Take 1 tablet (2.5 mg) by mouth daily 90 tablet 3    levothyroxine (SYNTHROID/LEVOTHROID) 75 MCG tablet Take 1 tablet (75 mcg) by mouth  "daily 90 tablet 3    multivitamin, therapeutic (THERA-VIT) TABS tablet Take 1 tablet by mouth daily      nitroFURantoin macrocrystal-monohydrate (MACROBID) 100 MG capsule TAKE 1 CAPSULE BY MOUTH AFTER INTRCOURSE 30 capsule 3    rivaroxaban ANTICOAGULANT (XARELTO) 2.5 MG TABS tablet Take 1 tablet (2.5 mg) by mouth 2 times daily 180 tablet 3    rosuvastatin (CRESTOR) 20 MG tablet TAKE 1 TABLET BY MOUTH EVERY DAY 90 tablet 1    solifenacin (VESICARE) 5 MG tablet Take 1 tablet (5 mg) by mouth daily 90 tablet 3       Allergies:  Allergies   Allergen Reactions    Celexa [Citalopram Hydrobromide]      Decreased libido       Family history:  Family History   Problem Relation Age of Onset    C.A.D. Father         MI 57    Alcohol/Drug Father         etoh    Coronary Artery Disease Father     Obesity Mother     Osteoporosis Mother     Colon Cancer Brother 70    Hyperlipidemia Son     Anxiety Disorder Son     Hyperlipidemia Son         very high, experimental drug    C.A.D. Paternal Grandmother         ascvd    Diabetes Maternal Grandmother     Cancer Maternal Grandmother     C.A.D. Paternal Uncle         Mi  age 48    Cancer Maternal Aunt         pancreatic CA    Hyperlipidemia Son     Hyperlipidemia Cousin        Social history:  Social History     Tobacco Use    Smoking status: Never     Passive exposure: Never    Smokeless tobacco: Never   Substance Use Topics    Alcohol use: Never    Marital status: .    Nursing Notes:   Marci Maravilla  2023 11:07 AM  Signed  Chief Complaint   Patient presents with    New Patient     Incomplete evacuation       Vitals:    23 1105   BP: 126/79   BP Location: Left arm   Patient Position: Sitting   Cuff Size: Adult Regular   Pulse: 70   SpO2: 97%   Weight: 157 lb 11.2 oz   Height: 5' 5.5\"       Body mass index is 25.84 kg/m .    Marci Maravilla CMA       20 minutes spent on the date of encounter performing chart review, history and exam, documentation and further " activities as noted above    ELVA Abbasi, NP-C  Colon and Rectal Surgery   [facility]    This note was created using speech recognition software and may contain unintended word substitutions.

## 2023-11-29 NOTE — PATIENT INSTRUCTIONS
Start a daily fiber supplement such as Citrucel, Metamucil, or Benefiber. Start with once a day and slowly increase up to three times a day, if needed, over the next 4-6 weeks  Add Miralax in addition if needed. Can take a half a dose every other day or up to a full dose three times a day. Titrate as needed.  If you still feel like you cannot completely evacuate despite having soft stools, return to clinic.  If still having constipation, meet with a gastroenterologist.

## 2023-11-29 NOTE — NURSING NOTE
"Chief Complaint   Patient presents with    New Patient     Incomplete evacuation       Vitals:    11/29/23 1105   BP: 126/79   BP Location: Left arm   Patient Position: Sitting   Cuff Size: Adult Regular   Pulse: 70   SpO2: 97%   Weight: 157 lb 11.2 oz   Height: 5' 5.5\"       Body mass index is 25.84 kg/m .    Marci Maravilla CMA    "

## 2023-11-30 ENCOUNTER — THERAPY VISIT (OUTPATIENT)
Dept: PHYSICAL THERAPY | Facility: CLINIC | Age: 73
End: 2023-11-30
Attending: STUDENT IN AN ORGANIZED HEALTH CARE EDUCATION/TRAINING PROGRAM
Payer: COMMERCIAL

## 2023-11-30 DIAGNOSIS — R53.81 PHYSICAL DECONDITIONING: Primary | ICD-10-CM

## 2023-11-30 PROCEDURE — 97530 THERAPEUTIC ACTIVITIES: CPT | Mod: GP | Performed by: PHYSICAL THERAPIST

## 2023-11-30 PROCEDURE — 97110 THERAPEUTIC EXERCISES: CPT | Mod: 59 | Performed by: PHYSICAL THERAPIST

## 2023-12-06 ENCOUNTER — LAB (OUTPATIENT)
Dept: LAB | Facility: CLINIC | Age: 73
End: 2023-12-06
Payer: COMMERCIAL

## 2023-12-06 DIAGNOSIS — Z95.828 HISTORY OF ARTERIAL BYPASS OF LOWER EXTREMITY: ICD-10-CM

## 2023-12-06 LAB
CHOLEST SERPL-MCNC: 146 MG/DL
FASTING STATUS PATIENT QL REPORTED: YES
HDLC SERPL-MCNC: 54 MG/DL
LDLC SERPL CALC-MCNC: 68 MG/DL
NONHDLC SERPL-MCNC: 92 MG/DL
TRIGL SERPL-MCNC: 119 MG/DL

## 2023-12-06 PROCEDURE — 36415 COLL VENOUS BLD VENIPUNCTURE: CPT

## 2023-12-06 PROCEDURE — 80061 LIPID PANEL: CPT

## 2023-12-13 ENCOUNTER — OFFICE VISIT (OUTPATIENT)
Dept: ENDOCRINOLOGY | Facility: CLINIC | Age: 73
End: 2023-12-13
Payer: COMMERCIAL

## 2023-12-13 VITALS
WEIGHT: 158.6 LBS | DIASTOLIC BLOOD PRESSURE: 86 MMHG | SYSTOLIC BLOOD PRESSURE: 148 MMHG | OXYGEN SATURATION: 97 % | BODY MASS INDEX: 25.99 KG/M2 | HEART RATE: 69 BPM

## 2023-12-13 DIAGNOSIS — M80.00XD AGE-RELATED OSTEOPOROSIS WITH CURRENT PATHOLOGICAL FRACTURE WITH ROUTINE HEALING, SUBSEQUENT ENCOUNTER: Primary | ICD-10-CM

## 2023-12-13 DIAGNOSIS — E21.3 HYPERPARATHYROIDISM (H): ICD-10-CM

## 2023-12-13 PROCEDURE — 99204 OFFICE O/P NEW MOD 45 MIN: CPT | Performed by: INTERNAL MEDICINE

## 2023-12-13 RX ORDER — EPINEPHRINE 1 MG/ML
0.3 INJECTION, SOLUTION, CONCENTRATE INTRAVENOUS EVERY 5 MIN PRN
Status: CANCELLED | OUTPATIENT
Start: 2024-02-12

## 2023-12-13 RX ORDER — METHYLPREDNISOLONE SODIUM SUCCINATE 125 MG/2ML
125 INJECTION, POWDER, LYOPHILIZED, FOR SOLUTION INTRAMUSCULAR; INTRAVENOUS
Status: CANCELLED
Start: 2024-02-12

## 2023-12-13 RX ORDER — MEPERIDINE HYDROCHLORIDE 25 MG/ML
25 INJECTION INTRAMUSCULAR; INTRAVENOUS; SUBCUTANEOUS EVERY 30 MIN PRN
Status: CANCELLED | OUTPATIENT
Start: 2024-02-12

## 2023-12-13 RX ORDER — ALBUTEROL SULFATE 0.83 MG/ML
2.5 SOLUTION RESPIRATORY (INHALATION)
Status: CANCELLED | OUTPATIENT
Start: 2024-02-12

## 2023-12-13 RX ORDER — DIPHENHYDRAMINE HYDROCHLORIDE 50 MG/ML
50 INJECTION INTRAMUSCULAR; INTRAVENOUS
Status: CANCELLED
Start: 2024-02-12

## 2023-12-13 RX ORDER — ALBUTEROL SULFATE 90 UG/1
1-2 AEROSOL, METERED RESPIRATORY (INHALATION)
Status: CANCELLED
Start: 2024-02-12
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
## 2023-12-13 NOTE — PATIENT INSTRUCTIONS
## Osteoporosis on Fosamax 1249-5864, restarted Fosamax 2015-202, on Prolia 1/2023  ## Possible primary hyperparathyroidism  Over the past year, no Fx.  Labs including blood calcium and parathyroid hormone level, could have primary hyperparathyroidism.  If labs confirm primary hyperparathyroidism, base on bone density, meets criteria for surgery  -- recheck labs (hold supplement contains biotin 7 days before blood work), 24 hour urine   -- DXA in Mcclusky  -- 3rd dose of Prolia 1/25/24-2/25/24  -- continue current calcium intake (supplement+diet about 1000 mg/day) Calcium carbonate is best absorbed with meal. Calcium citrate can be taken by itself    Follow up late February/early March 2024
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
## 2023-12-19 DIAGNOSIS — N39.0 RECURRENT UTI: ICD-10-CM

## 2023-12-21 ENCOUNTER — LAB (OUTPATIENT)
Dept: LAB | Facility: CLINIC | Age: 73
End: 2023-12-21
Payer: COMMERCIAL

## 2023-12-21 DIAGNOSIS — R73.01 IMPAIRED FASTING GLUCOSE: ICD-10-CM

## 2023-12-21 DIAGNOSIS — E21.3 HYPERPARATHYROIDISM (H): ICD-10-CM

## 2023-12-21 LAB
ALBUMIN SERPL BCG-MCNC: 4.4 G/DL (ref 3.5–5.2)
ALP SERPL-CCNC: 68 U/L (ref 40–150)
ANION GAP SERPL CALCULATED.3IONS-SCNC: 8 MMOL/L (ref 7–15)
BUN SERPL-MCNC: 13.2 MG/DL (ref 8–23)
CA-I BLD-MCNC: 5.1 MG/DL (ref 4.4–5.2)
CALCIUM SERPL-MCNC: 9.9 MG/DL (ref 8.8–10.2)
CHLORIDE SERPL-SCNC: 103 MMOL/L (ref 98–107)
CREAT SERPL-MCNC: 0.66 MG/DL (ref 0.51–0.95)
DEPRECATED HCO3 PLAS-SCNC: 28 MMOL/L (ref 22–29)
EGFRCR SERPLBLD CKD-EPI 2021: >90 ML/MIN/1.73M2
GLUCOSE SERPL-MCNC: 60 MG/DL (ref 70–99)
HBA1C MFR BLD: 5.7 % (ref 0–5.6)
PHOSPHATE SERPL-MCNC: 3.1 MG/DL (ref 2.5–4.5)
POTASSIUM SERPL-SCNC: 4.3 MMOL/L (ref 3.4–5.3)
PTH-INTACT SERPL-MCNC: 90 PG/ML (ref 15–65)
SODIUM SERPL-SCNC: 139 MMOL/L (ref 135–145)
TOTAL PROTEIN SERUM FOR ELP: 6.9 G/DL (ref 6.4–8.3)
TSH SERPL DL<=0.005 MIU/L-ACNC: 0.63 UIU/ML (ref 0.3–4.2)
VIT D+METAB SERPL-MCNC: 43 NG/ML (ref 20–50)

## 2023-12-21 PROCEDURE — 82330 ASSAY OF CALCIUM: CPT

## 2023-12-21 PROCEDURE — 82306 VITAMIN D 25 HYDROXY: CPT

## 2023-12-21 PROCEDURE — 83036 HEMOGLOBIN GLYCOSYLATED A1C: CPT

## 2023-12-21 PROCEDURE — 84075 ASSAY ALKALINE PHOSPHATASE: CPT

## 2023-12-21 PROCEDURE — 82570 ASSAY OF URINE CREATININE: CPT

## 2023-12-21 PROCEDURE — 84165 PROTEIN E-PHORESIS SERUM: CPT | Performed by: PATHOLOGY

## 2023-12-21 PROCEDURE — 80069 RENAL FUNCTION PANEL: CPT

## 2023-12-21 PROCEDURE — 84155 ASSAY OF PROTEIN SERUM: CPT

## 2023-12-21 PROCEDURE — 36415 COLL VENOUS BLD VENIPUNCTURE: CPT

## 2023-12-21 PROCEDURE — 83970 ASSAY OF PARATHORMONE: CPT

## 2023-12-21 PROCEDURE — 84443 ASSAY THYROID STIM HORMONE: CPT | Mod: GA

## 2023-12-21 PROCEDURE — 82340 ASSAY OF CALCIUM IN URINE: CPT

## 2023-12-21 PROCEDURE — 81050 URINALYSIS VOLUME MEASURE: CPT

## 2023-12-21 PROCEDURE — 86364 TISS TRNSGLTMNASE EA IG CLAS: CPT

## 2023-12-22 ENCOUNTER — MYC MEDICAL ADVICE (OUTPATIENT)
Dept: INTERNAL MEDICINE | Facility: CLINIC | Age: 73
End: 2023-12-22
Payer: COMMERCIAL

## 2023-12-22 LAB
ALBUMIN SERPL ELPH-MCNC: 4.2 G/DL (ref 3.7–5.1)
ALPHA1 GLOB SERPL ELPH-MCNC: 0.3 G/DL (ref 0.2–0.4)
ALPHA2 GLOB SERPL ELPH-MCNC: 0.6 G/DL (ref 0.5–0.9)
B-GLOBULIN SERPL ELPH-MCNC: 1 G/DL (ref 0.6–1)
CALCIUM 24H UR-MRATE: 0.15 G/SPEC (ref 0.1–0.3)
CALCIUM UR-MCNC: 9.2 MG/DL
COLLECT DURATION TIME UR: 24 H
COLLECT DURATION TIME UR: 24 H
CREAT 24H UR-MRATE: 0.99 G/SPEC (ref 0.72–1.51)
CREAT UR-MCNC: 60.8 MG/DL
GAMMA GLOB SERPL ELPH-MCNC: 0.9 G/DL (ref 0.7–1.6)
M PROTEIN SERPL ELPH-MCNC: 0 G/DL
PROT PATTERN SERPL ELPH-IMP: NORMAL
SPECIMEN VOL UR: 1625 ML
SPECIMEN VOL UR: 1625 ML
TTG IGA SER-ACNC: 0.6 U/ML
TTG IGG SER-ACNC: <0.6 U/ML

## 2023-12-22 NOTE — TELEPHONE ENCOUNTER
"Call to patient. States she has a history of frequent UTIs usually after intercourse. States her previous primary care provider prescribed this for her to be taken after intercourse to prevent a urinary tract infection. States this treatment has decreased the number of UTIs. Patient takes 1 capsule after intercourse. Patient has travel plans coming up so she would like this to take with. They are leaving 1/9/23.    Patient is not currently having urinary tract infection symptoms. States she has only had 2 visits with Dr. Frias. States these visits were \"packed with discussing other issues\" so she has not discussed this with Dr. Way yet.   "

## 2023-12-22 NOTE — TELEPHONE ENCOUNTER
If the patient has urinary infection symptoms, the patient should do e-visit    Otherwise she has a prescription for nitrofurantoin

## 2023-12-23 RX ORDER — NITROFURANTOIN 25; 75 MG/1; MG/1
CAPSULE ORAL
Qty: 30 CAPSULE | Refills: 0 | Status: SHIPPED | OUTPATIENT
Start: 2023-12-23

## 2023-12-27 ENCOUNTER — ANCILLARY PROCEDURE (OUTPATIENT)
Dept: BONE DENSITY | Facility: CLINIC | Age: 73
End: 2023-12-27
Attending: INTERNAL MEDICINE
Payer: COMMERCIAL

## 2023-12-27 DIAGNOSIS — E21.3 HYPERPARATHYROIDISM (H): ICD-10-CM

## 2023-12-27 PROCEDURE — 77081 DXA BONE DENSITY APPENDICULR: CPT | Mod: TC | Performed by: RADIOLOGY

## 2023-12-27 PROCEDURE — 77080 DXA BONE DENSITY AXIAL: CPT | Mod: TC | Performed by: RADIOLOGY

## 2023-12-29 DIAGNOSIS — M81.0 AGE-RELATED OSTEOPOROSIS WITHOUT CURRENT PATHOLOGICAL FRACTURE: Primary | ICD-10-CM

## 2024-01-03 ENCOUNTER — E-VISIT (OUTPATIENT)
Dept: INTERNAL MEDICINE | Facility: CLINIC | Age: 74
End: 2024-01-03
Payer: COMMERCIAL

## 2024-01-03 ENCOUNTER — LAB (OUTPATIENT)
Dept: LAB | Facility: CLINIC | Age: 74
End: 2024-01-03
Payer: COMMERCIAL

## 2024-01-03 DIAGNOSIS — R30.0 DYSURIA: Primary | ICD-10-CM

## 2024-01-03 DIAGNOSIS — R30.0 DYSURIA: ICD-10-CM

## 2024-01-03 DIAGNOSIS — R31.29 MICROSCOPIC HEMATURIA: ICD-10-CM

## 2024-01-03 LAB
ALBUMIN UR-MCNC: NEGATIVE MG/DL
APPEARANCE UR: CLEAR
BACTERIA #/AREA URNS HPF: ABNORMAL /HPF
BILIRUB UR QL STRIP: NEGATIVE
COLOR UR AUTO: ABNORMAL
GLUCOSE UR STRIP-MCNC: NEGATIVE MG/DL
HGB UR QL STRIP: ABNORMAL
KETONES UR STRIP-MCNC: NEGATIVE MG/DL
LEUKOCYTE ESTERASE UR QL STRIP: NEGATIVE
MUCOUS THREADS #/AREA URNS LPF: PRESENT /LPF
NITRATE UR QL: POSITIVE
PH UR STRIP: 5.5 [PH] (ref 5–7)
RBC #/AREA URNS AUTO: ABNORMAL /HPF
SP GR UR STRIP: 1.02 (ref 1–1.03)
SQUAMOUS #/AREA URNS AUTO: ABNORMAL /LPF
UROBILINOGEN UR STRIP-ACNC: 0.2 E.U./DL
WBC #/AREA URNS AUTO: ABNORMAL /HPF

## 2024-01-03 PROCEDURE — 99421 OL DIG E/M SVC 5-10 MIN: CPT | Performed by: INTERNAL MEDICINE

## 2024-01-03 PROCEDURE — 87086 URINE CULTURE/COLONY COUNT: CPT

## 2024-01-03 PROCEDURE — 81001 URINALYSIS AUTO W/SCOPE: CPT

## 2024-01-04 DIAGNOSIS — R30.0 DYSURIA: Primary | ICD-10-CM

## 2024-01-04 RX ORDER — SULFAMETHOXAZOLE/TRIMETHOPRIM 800-160 MG
1 TABLET ORAL 2 TIMES DAILY
Qty: 6 TABLET | Refills: 0 | Status: SHIPPED | OUTPATIENT
Start: 2024-01-04 | End: 2024-02-16

## 2024-01-05 PROBLEM — R53.81 PHYSICAL DECONDITIONING: Status: RESOLVED | Noted: 2023-11-16 | Resolved: 2024-01-05

## 2024-01-05 LAB — BACTERIA UR CULT: NO GROWTH

## 2024-01-05 NOTE — PROGRESS NOTES
DISCHARGE  Reason for Discharge: Patient has met all goals.  Patient chooses to discontinue therapy.    Equipment Issued: none    Discharge Plan: Patient to continue home program.    Referring Provider:  Leeann Sharma         11/30/23 0500   Appointment Info   Signing clinician's name / credentials Shahrzad López, PT, DPT   Total/Authorized Visits PT E&T   Visits Used 3   Medical Diagnosis L knee weakness   PT Tx Diagnosis L knee weakness   Quick Adds Certification   Progress Note/Certification   Start of Care Date 11/16/23   Onset of illness/injury or Date of Surgery 10/10/23  (MD order)   Therapy Frequency once per week   Predicted Duration 10 weeks   Certification date from 11/16/23   Certification date to 01/25/24   Progress Note Completed Date 11/16/23   PT Goal 1   Goal Identifier Activity tolerance   Goal Description pt will be able to ambulate 30 minutes without resting   Rationale to maximize safety and independence within the community   Goal Progress able to walk 15 minutes at a time, progressing   Target Date 01/25/24   Subjective Report   Subjective Report pt reports things are going well, has been doing exercises. Pt has been trying to walk a little more, walking for 15 minutes indoors at a time.   Objective Measures   Objective Measures Objective Measure 1   Objective Measure 1   Details progressed activity tolerance with NuStep, continues to have L knee limited ROM. Weak hip flexion on L side, focus today on strengthening hip flexors and avoiding compensations   Treatment Interventions (PT)   Interventions Therapeutic Procedure/Exercise;Therapeutic Activity   Therapeutic Procedure/Exercise   Therapeutic Procedures: strength, endurance, ROM, flexibillity minutes (47272) 30   Ther Proc 1 NuStep   Ther Proc 1 - Details level 7 resistance, 12  minutes   PTRx Ther Proc 1 Standing Hip Abduction   PTRx Ther Proc 1 - Details cues for form and upright posture x 10 reps each side    PTRx Ther Proc 2  Hip Flexion Straight Leg Raise   PTRx Ther Proc 2 - Details x 15 reps L side challenging    PTRx Ther Proc 3 Standing Hamstring Curl Knee Flexion   PTRx Ther Proc 3 - Details some cramping x 10 reps supine heel slides too intense. limited ROM    PTRx Ther Proc 4 Hip Abduction Straight Leg Raise   PTRx Ther Proc 4 - Details cues for form x 10 reps each side   Patient Response/Progress supine heel slide too intense for hamstrings, clamshells too cumbersome for positioning   PTRx Ther Proc 5 Standing Hip Flexion   PTRx Ther Proc 5 - Details focus on hip flexion and avoiding hip hiking compensation x 10 completed with 6 inch step and without target mirror used for visual feedback to improve form    Therapeutic Activity   Therapeutic Activities: dynamic activities to improve functional performance minutes (28147) 10   Ther Act 1 activity tolerance   Ther Act 1 - Details pt educated on continued walking or exercise biking to improve activity tolerance daily, discussed importance of maintaining active lifestyle   PTRx Ther Act 1 Stair Step Ups   PTRx Ther Act 1 - Details cues for controlled step up and controlled lowering x 20 reps 4 inch step    Neuromuscular Re-education   PTRx Neuro Re-ed 1 Tandem Stance Static With Chair   PTRx Neuro Re-ed 1 - Details cues for form 30 second holds challenging   Education   Learner/Method Patient   Education Comments Pt educated on PT role and plan of care   Plan   Home program see PTRx printed   Plan for next session Progress NuStep, heel slides again? squats or step ups   Total Session Time   Timed Code Treatment Minutes 40   Total Treatment Time (sum of timed and untimed services) 40

## 2024-01-08 ENCOUNTER — TELEPHONE (OUTPATIENT)
Dept: NEUROSURGERY | Facility: CLINIC | Age: 74
End: 2024-01-08
Payer: COMMERCIAL

## 2024-01-09 ENCOUNTER — OFFICE VISIT (OUTPATIENT)
Dept: PHYSICAL MEDICINE AND REHAB | Facility: CLINIC | Age: 74
End: 2024-01-09
Attending: PHYSICAL MEDICINE & REHABILITATION
Payer: COMMERCIAL

## 2024-01-09 VITALS
HEIGHT: 66 IN | WEIGHT: 158.56 LBS | OXYGEN SATURATION: 97 % | DIASTOLIC BLOOD PRESSURE: 80 MMHG | SYSTOLIC BLOOD PRESSURE: 120 MMHG | BODY MASS INDEX: 25.48 KG/M2 | HEART RATE: 72 BPM

## 2024-01-09 DIAGNOSIS — Z85.831 HISTORY OF SARCOMA OF SOFT TISSUE: ICD-10-CM

## 2024-01-09 DIAGNOSIS — I89.0 LYMPHEDEMA OF LEFT LEG: ICD-10-CM

## 2024-01-09 DIAGNOSIS — M25.662 DECREASED RANGE OF MOTION (ROM) OF LEFT KNEE: Primary | ICD-10-CM

## 2024-01-09 PROCEDURE — 99214 OFFICE O/P EST MOD 30 MIN: CPT | Performed by: PHYSICAL MEDICINE & REHABILITATION

## 2024-01-09 ASSESSMENT — PAIN SCALES - GENERAL: PAINLEVEL: NO PAIN (0)

## 2024-01-09 NOTE — PROGRESS NOTES
Patient seen at the request of Dr Leeann Sharma for an opinion and evaluation of L knee swelling.      HISTORY OF PRESENT ILLNESS:  Angeli Jacobson is a 73 year old female who presents with a chief complaint of L knee swelling.     She has a complex history of LLE lymphedema following L thigh liposarcoma resection/radiation (2000),  L patellar fracture repair and tibial nailing after fall on driveway (2020), quadricepsplasty with another patellar fracture (2021, revision 2022). No pain but has limited flexion of the knee and there has been a question raised as to if this could be associated with a joint effusion. Her limited ROM has been slowly progressive since her first knee surgery. Lymphedema is managed with compression/manual drainage/therapy, but makes it difficult to say if there is an actual effusion Has not noticed any changes in ROM when her LLE lymphedema fluctuations. No numbness/tingling, mechanical symptoms, or giving out.    Functional limitations: Difficulty getting in/out of car. Uses straight cane for balance in the setting of impaired sensation      PRIOR INJURIES/TREATMENT:   Ice/Heat: yes - post op  Brace: none since surgery  Physical Therapy: yes - post op     - Current Pain Medications -   None for this problem    - Prior/Trialed Pain Medications -   None for this problem    Prior Procedures:  Date      Procedure     Improvement (%)  none         Prior Related Surgery: Multiple surgeries on LLE and externsor mechanism, including L thigh liposarcoma resection/radiation (2000),  L patellar fracture repair and tibial nailing after fall on driveway (2020), quadricepsplasty with another patellar fracture (2021, revision 2022)     Other (acupuncture, OMT, CMM, TENS, DME, etc.): none    Specialists Seen - (with most recent, available notes and clinic visits reviewed)   1. Cancer rehab/Lymphedema (Dr Sharma) - 10/10/23     IMAGING - reviewed     5/31/2022 LEFT KNEE PORTABLE ONE TO TWO VIEWS                                                                     IMPRESSION: Previously seen figure-of-eight tension banding across the  left patella has been intervally removed. Superior to inferior  directed lag screws across the patella with superior washers are  unchanged. Chronic healed left patella fracture deformity. Fractured  screw in the distal left femur is unchanged. Previous nailing of the  left tibia, similar to prior. No acute left knee fracture or  dislocation. Left knee joint spaces are unchanged. Chronic bone  fragment along the cephalad left patella is unchanged. Soft tissue gas  is seen, expected immediate postoperative. Vertically oriented  anterior left knee skin staples midline. No left knee soft tissue  swelling.    Review Of Systems:  I am responding to those symptoms which are directly relevant to the specific indication for my consultation. I recommend that the patient follow up with their primary or referring provider to pursue any other symptoms which may be of concern.       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     She  has a past surgical history that includes APPENDECTOMY; DILATION/CURETTAGE DIAG/THER NON OB (02/1997); COLP CERVIX/UPPER VAGINA (07/1997); NONSPECIFIC PROCEDURE (04/2000); excision malig lesion>1.25cm (5/2000); COLONOSCOPY THRU STOMA, DIAGNOSTIC (2006); Colonoscopy (N/A, 2/5/2016); Explore groin (Right, 5/1/2018); vascular surgery (04/30/2018); IR Angiogram through Catheter Follow Up (12/20/2018); IR Lower Extremity Angiogram Left (12/19/2018); IR Angiogram through Catheter Follow Up (12/21/2018); Bypass graft femoropopliteal (Left, 1/21/2019); hip surgery (Left, 04/13/2019); Cystoscopy; Arthroplasty hip (Left, 10/4/2019); Open reduction internal fixation rodding intramedullary tibia (Left, 12/21/2020); Open reduction internal fixation patella (Left, 12/21/2020); Repair tendon quadriceps (Left, 7/28/2021); Colonoscopy (N/A, 12/10/2021); biopsy (April 2000); Repair tendon quadriceps (Left, 5/31/2022); Remove hardware knee (Left, 5/31/2022); Biopsy Breast Seed Localization (Right, 12/14/2022); and Biopsy node sentinel (Right, 12/14/2022).    Family History  Her family history includes Alcohol/Drug in her father; Anxiety Disorder in her son; C.A.D. in her father, paternal grandmother, and paternal uncle; Cancer in her maternal aunt and maternal grandmother; Colon Cancer (age of onset: 70) in her brother; Coronary Artery Disease in her father; Diabetes in her maternal grandmother; Hyperlipidemia in her cousin, son, son, and son; Obesity in her mother; Osteoporosis in her mother.     Social History:  Work: retired  History of any legal related pain issues: none  Current living situation: lives with   She  reports that she has never smoked. She has never been exposed to tobacco smoke. She has never used smokeless tobacco. She reports that she does not drink alcohol and does not use drugs.        Current Medications:   She has a current medication list which includes the  "following prescription(s): calcium carbonate 600 mg-vitamin d 400 units, clopidogrel, denosumab, evolocumab, ezetimibe, letrozole, levothyroxine, multivitamin, therapeutic, nitrofurantoin macrocrystal-monohydrate, rivaroxaban anticoagulant, rosuvastatin, solifenacin, and sulfamethoxazole-trimethoprim, and the following Facility-Administered Medications: ropivacaine, lidocaine, and triamcinolone.     Allergies:    -- Celexa [Citalopram Hydrobromide]     --  Decreased libido    PHYSICAL EXAMINATION:  /80   Pulse 72   Ht 1.664 m (5' 5.5\")   Wt 71.9 kg (158 lb 9 oz)   LMP  (LMP Unknown)   SpO2 97%   BMI 25.98 kg/m     General: Pleasant, straightforward, WDWN individual.  Mental Status: Pleasant, direct, appropriate mood and affect  Resp: breathing is unlabored without audible wheeze  Vascular: No visible cyanosis, no venous stasis changes  Heme: No visible ecchymosis or erythema on extremities  Skin: No notable rash    Musculoskeletal:  Well healed surgical scars on knee and thigh. ROM 0-90 deg. Overall non-tender over bony landmarks. Stable to varus/valgus stress. Difficult to assess if appreciable effusion due to lymphedema of LLE. Small palpable nodularity near medial hamstrings that may represent small baker cyst. LLE Strength 5/5 except 4+/5 in HF, KE    ASSESSMENT:  Angeli Jacobson is a pleasant 73 year old female who presents with decreased L knee flexion in the setting of LLE lymphedema and complex L extensor mechanism surgical history. Only able to flex to ~90 degrees, which has been a slowly progressive problem since her first surgery. There does not seem to be a john effusion, but given her lymphedema, it is difficult to address if there is some fluid that may be contributing to a degree. She likely has a degree of contracture from her procedures for patellar fractures/quad tendon repair and lengthening procedure and there is the possibility that the lymphedema may also contribute, albeit to " a smaller degree. Ultrasound will be the easiest way to assess how much fluid there may be and assist with aspiration if it is an appreciable amount.    PLAN:  - RTC at [facility] for ultrasound evaluation to assess if there is a joint effusion and consider aspiration if indicated    Ready to learn, no apparent learning barriers.  Education provided on treatment plan according to patient's preferred learning style.  Patient verbalizes understanding.     Patient was seen and discussed with Dr Mckee who agrees with the assessment and plan.  __________________________________  Vinod Márquez MD  Physical Medicine & Rehabilitation  PGY4    I was physically present for the E/M service provided. I agree with the House Officer note and plan, which I have reviewed and edited where appropriate.  ________________________________   Randell Mckee MD  Department of Physical Medicine & Rehabilitation       I spent a total of 35 minutes on the day of the visit.  Time spent by me doing chart review, history and exam, documentation and further activities per the note

## 2024-01-09 NOTE — NURSING NOTE
"Angeli Jacobson is a 73 year old female who presents for:  Chief Complaint   Patient presents with    Consult     Knee pain        Initial Vitals:  /80   Pulse 72   Ht 1.664 m (5' 5.5\")   Wt 71.9 kg (158 lb 9 oz)   LMP  (LMP Unknown)   SpO2 97%   BMI 25.98 kg/m   Estimated body mass index is 25.98 kg/m  as calculated from the following:    Height as of this encounter: 1.664 m (5' 5.5\").    Weight as of this encounter: 71.9 kg (158 lb 9 oz).. Body surface area is 1.82 meters squared. BP completed using cuff size: small regular  No Pain (0)        Shae Quijano   "

## 2024-01-09 NOTE — LETTER
1/9/2024       RE: Angeli Jacobson  [id number]  [facility] [id number]       Dear Colleague,    Thank you for referring your patient, Angeli Jacobson, to the [facility] at [facility]. Please see a copy of my visit note below.    Patient seen at the request of Dr Leeann Sharma for an opinion and evaluation of L knee swelling.      HISTORY OF PRESENT ILLNESS:  Angeli Jacobson is a 73 year old female who presents with a chief complaint of L knee swelling.     She has a complex history of LLE lymphedema following L thigh liposarcoma resection/radiation (2000),  L patellar fracture repair and tibial nailing after fall on driveway (2020), quadricepsplasty with another patellar fracture (2021, revision 2022). No pain but has limited flexion of the knee and there has been a question raised as to if this could be associated with a joint effusion. Her limited ROM has been slowly progressive since her first knee surgery. Lymphedema is managed with compression/manual drainage/therapy, but makes it difficult to say if there is an actual effusion Has not noticed any changes in ROM when her LLE lymphedema fluctuations. No numbness/tingling, mechanical symptoms, or giving out.    Functional limitations: Difficulty getting in/out of car. Uses straight cane for balance in the setting of impaired sensation      PRIOR INJURIES/TREATMENT:   Ice/Heat: yes - post op  Brace: none since surgery  Physical Therapy: yes - post op     - Current Pain Medications -   None for this problem    - Prior/Trialed Pain Medications -   None for this problem    Prior Procedures:  Date      Procedure     Improvement (%)  none         Prior Related Surgery: Multiple surgeries on LLE and externsor mechanism, including L thigh liposarcoma resection/radiation (2000),  L patellar fracture repair and tibial nailing after fall on driveway (2020), quadricepsplasty with  another patellar fracture (2021, revision 2022)     Other (acupuncture, OMT, CMM, TENS, DME, etc.): none    Specialists Seen - (with most recent, available notes and clinic visits reviewed)   1. Cancer rehab/Lymphedema (Dr Sharma) - 10/10/23     IMAGING - reviewed     5/31/2022 LEFT KNEE PORTABLE ONE TO TWO VIEWS                                                                    IMPRESSION: Previously seen figure-of-eight tension banding across the  left patella has been intervally removed. Superior to inferior  directed lag screws across the patella with superior washers are  unchanged. Chronic healed left patella fracture deformity. Fractured  screw in the distal left femur is unchanged. Previous nailing of the  left tibia, similar to prior. No acute left knee fracture or  dislocation. Left knee joint spaces are unchanged. Chronic bone  fragment along the cephalad left patella is unchanged. Soft tissue gas  is seen, expected immediate postoperative. Vertically oriented  anterior left knee skin staples midline. No left knee soft tissue  swelling.    Review Of Systems:  I am responding to those symptoms which are directly relevant to the specific indication for my consultation. I recommend that the patient follow up with their primary or referring provider to pursue any other symptoms which may be of concern.       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     She  has a past surgical history that includes APPENDECTOMY; DILATION/CURETTAGE DIAG/THER NON OB (02/1997); COLP CERVIX/UPPER VAGINA (07/1997); NONSPECIFIC PROCEDURE (04/2000); excision malig lesion>1.25cm (5/2000); COLONOSCOPY THRU STOMA, DIAGNOSTIC (2006); Colonoscopy (N/A, 2/5/2016); Explore groin (Right, 5/1/2018); vascular surgery (04/30/2018); IR Angiogram through Catheter Follow Up (12/20/2018); IR Lower Extremity Angiogram Left (12/19/2018); IR Angiogram through Catheter Follow Up (12/21/2018); Bypass graft femoropopliteal (Left, 1/21/2019); hip surgery (Left, 04/13/2019); Cystoscopy; Arthroplasty hip (Left, 10/4/2019); Open reduction internal fixation rodding intramedullary tibia (Left, 12/21/2020); Open reduction internal fixation patella (Left, 12/21/2020); Repair tendon quadriceps (Left, 7/28/2021); Colonoscopy (N/A, 12/10/2021); biopsy (April 2000); Repair tendon quadriceps (Left, 5/31/2022); Remove hardware knee (Left, 5/31/2022); Biopsy Breast Seed Localization (Right, 12/14/2022); and Biopsy node sentinel (Right, 12/14/2022).    Family History  Her family history includes Alcohol/Drug in her father; Anxiety Disorder in her son; C.A.D. in her father, paternal grandmother, and paternal uncle; Cancer in her maternal aunt and maternal grandmother; Colon Cancer (age of onset: 70) in her brother; Coronary Artery Disease in her father; Diabetes in her maternal grandmother; Hyperlipidemia in her cousin, son, son, and son; Obesity in her mother; Osteoporosis in her mother.     Social History:  Work: retired  History of any legal related pain issues:  "none  Current living situation: lives with   She  reports that she has never smoked. She has never been exposed to tobacco smoke. She has never used smokeless tobacco. She reports that she does not drink alcohol and does not use drugs.        Current Medications:   She has a current medication list which includes the following prescription(s): calcium carbonate 600 mg-vitamin d 400 units, clopidogrel, denosumab, evolocumab, ezetimibe, letrozole, levothyroxine, multivitamin, therapeutic, nitrofurantoin macrocrystal-monohydrate, rivaroxaban anticoagulant, rosuvastatin, solifenacin, and sulfamethoxazole-trimethoprim, and the following Facility-Administered Medications: ropivacaine, lidocaine, and triamcinolone.     Allergies:    -- Celexa [Citalopram Hydrobromide]     --  Decreased libido    PHYSICAL EXAMINATION:  /80   Pulse 72   Ht 1.664 m (5' 5.5\")   Wt 71.9 kg (158 lb 9 oz)   LMP  (LMP Unknown)   SpO2 97%   BMI 25.98 kg/m     General: Pleasant, straightforward, WDWN individual.  Mental Status: Pleasant, direct, appropriate mood and affect  Resp: breathing is unlabored without audible wheeze  Vascular: No visible cyanosis, no venous stasis changes  Heme: No visible ecchymosis or erythema on extremities  Skin: No notable rash    Musculoskeletal:  Well healed surgical scars on knee and thigh. ROM 0-90 deg. Overall non-tender over bony landmarks. Stable to varus/valgus stress. Difficult to assess if appreciable effusion due to lymphedema of LLE. Small palpable nodularity near medial hamstrings that may represent small baker cyst. LLE Strength 5/5 except 4+/5 in HF, KE    ASSESSMENT:  Angeli Jacobson is a pleasant 73 year old female who presents with decreased L knee flexion in the setting of LLE lymphedema and complex L extensor mechanism surgical history. Only able to flex to ~90 degrees, which has been a slowly progressive problem since her first surgery. There does not seem to be a john " effusion, but given her lymphedema, it is difficult to address if there is some fluid that may be contributing to a degree. She likely has a degree of contracture from her procedures for patellar fractures/quad tendon repair and lengthening procedure and there is the possibility that the lymphedema may also contribute, albeit to a smaller degree. Ultrasound will be the easiest way to assess how much fluid there may be and assist with aspiration if it is an appreciable amount.    PLAN:  - RTC at [facility] for ultrasound evaluation to assess if there is a joint effusion and consider aspiration if indicated    Ready to learn, no apparent learning barriers.  Education provided on treatment plan according to patient's preferred learning style.  Patient verbalizes understanding.     Patient was seen and discussed with Dr Mckee who agrees with the assessment and plan.  __________________________________  Vinod Márquez MD  Physical Medicine & Rehabilitation  PGY4    I was physically present for the E/M service provided. I agree with the House Officer note and plan, which I have reviewed and edited where appropriate.      I spent a total of 35 minutes on the day of the visit.  Time spent by me doing chart review, history and exam, documentation and further activities per the note        Again, thank you for allowing me to participate in the care of your patient.      Sincerely,    Randell Mckee MD

## 2024-02-03 NOTE — TELEPHONE ENCOUNTER
Called and discussed   The patient is Stable - Low risk of patient condition declining or worsening    Shift Goals  Clinical Goals: heparin gtt, monitor pulses  Patient Goals: rest  Family Goals: n/a    Progress made toward(s) clinical / shift goals:  Pt medicated per MAR, resting. Heparin gtt in place, monitor Xa values. Pulses heard by doppler, strong. Updated pt on POC. Care ongoing.    Patient is not progressing towards the following goals:

## 2024-02-09 ENCOUNTER — OFFICE VISIT (OUTPATIENT)
Dept: SLEEP MEDICINE | Facility: CLINIC | Age: 74
End: 2024-02-09
Attending: INTERNAL MEDICINE
Payer: COMMERCIAL

## 2024-02-09 ENCOUNTER — DOCUMENTATION ONLY (OUTPATIENT)
Dept: LAB | Facility: CLINIC | Age: 74
End: 2024-02-09

## 2024-02-09 VITALS
SYSTOLIC BLOOD PRESSURE: 122 MMHG | BODY MASS INDEX: 26.55 KG/M2 | WEIGHT: 162 LBS | DIASTOLIC BLOOD PRESSURE: 76 MMHG | OXYGEN SATURATION: 95 % | HEART RATE: 72 BPM

## 2024-02-09 DIAGNOSIS — R06.83 SNORING: ICD-10-CM

## 2024-02-09 DIAGNOSIS — R06.81 WITNESSED APNEIC SPELLS: Primary | ICD-10-CM

## 2024-02-09 DIAGNOSIS — G47.19 EXCESSIVE DAYTIME SLEEPINESS: ICD-10-CM

## 2024-02-09 PROCEDURE — 99205 OFFICE O/P NEW HI 60 MIN: CPT | Performed by: PHYSICIAN ASSISTANT

## 2024-02-09 NOTE — NURSING NOTE
"Chief Complaint   Patient presents with    Sleep Problem     Disturbed sleep possible snoring.       Initial /76   Pulse 72   Wt 73.5 kg (162 lb)   LMP  (LMP Unknown)   SpO2 95%   BMI 26.55 kg/m   Estimated body mass index is 26.55 kg/m  as calculated from the following:    Height as of 1/9/24: 1.664 m (5' 5.5\").    Weight as of this encounter: 73.5 kg (162 lb).    Medication Reconciliation: complete    Neck circumference: 14 inches / 36 centimeters.    ESS    ASHLEY Campos CMA (AAMA)  "

## 2024-02-09 NOTE — PATIENT INSTRUCTIONS
"          MY TREATMENT INFORMATION FOR SLEEP APNEA-  Angelijuan pablo Jacobson  Frequently asked questions:  1. What is Obstructive Sleep Apnea (ALEXANDER)? ALEXANDER is the most common type of sleep apnea. Apnea means, \"without breath.\"  Apnea is most often caused by narrowing or collapse of the upper airway as muscles relax during sleep.   Almost everyone has occasional apneas. Most people with sleep apnea have had brief interruptions at night frequently for many years.  The severity of sleep apnea is related to how frequent and severe the events are.   2. What are the consequences of ALEXANDER? Symptoms include: feeling sleepy during the day, snoring loudly, gasping or stopping of breathing, trouble sleeping, and occasionally morning headaches or heartburn at night.  Sleepiness can be serious and even increase the risk of falling asleep while driving. Other health consequences may include development of high blood pressure and other cardiovascular disease in persons who are susceptible. Untreated ALEXANDER  can contribute to heart disease, stroke and diabetes.   3. What are the treatment options? In most situations, sleep apnea is a lifelong disease that must be managed with daily therapy. Medications are not effective for sleep apnea and surgery is generally not considered until other therapies have been tried. Your treatment is your choice . Continuous Positive Airway (CPAP) works right away and is the therapy that is effective in nearly everyone. An oral device to hold your jaw forward is usually the next most reliable option. Other options include postioning devices (to keep you off your back), weight loss, and surgery including a tongue pacing device. There is more detail about some of these options below.  4. Are my sleep studies covered by insurance? Although we will request verification of coverage, we advise you also check in advance of the study to ensure there is coverage.    Important tips for those choosing CPAP and similar " devices  For new devices, sign up for device YANN to monitor your device for your followup visits  We encourage you to utilize the [employer] yann or website ([employer] web (resmed.com) ) to monitor your therapy progress and share the data with your healthcare team when you discuss your sleep apnea.                                                    Know your equipment:  CPAP is continuous positive airway pressure that prevents obstructive sleep apnea by keeping the throat from collapsing while you are sleeping. In most cases, the device is  smart  and can slowly self-adjusts if your throat collapses and keeps a record every day of how well you are treated-this information is available to you and your care team.  BPAP is bilevel positive airway pressure that keeps your throat open and also assists each breath with a pressure boost to maintain adequate breathing.  Special kinds of BPAP are used in patients who have inadequate breathing from lung or heart disease. In most cases, the device is  smart  and can slowly self-adjusts to assist breathing. Like CPAP, the device keeps a record of how well you are treated.  Your mask is your connection to the device. You get to choose what feels most comfortable and the staff will help to make sure if fits. Here: are some examples of the different masks that are available: Magnetic mask aids may assist with use but there are safety issues that should be addressed when considering with magnets* ( see end of discussion).       Key points to remember on your journey with sleep apnea:  Sleep study.  PAP devices often need to be adjusted during a sleep study to show that they are effective and adjusted right.  Good tips to remember: Try wearing just the mask during a quiet time during the day so your body adapts to wearing it. A humidifier is recommended for comfort in most cases to prevent drying of your nose and throat. Allergy medication from your provider may help you if you are  having nasal congestion.  Getting settled-in. It takes more than one night for most of us to get used to wearing a mask. Try wearing just the mask during a quiet time during the day so your body adapts to wearing it. A humidifier is recommended for comfort in most cases. Our team will work with you carefully on the first day and will be in contact within 4 days and again at 2 and 4 weeks for advice and remote device adjustments. Your therapy is evaluated by the device each day.   Use it every night. The more you are able to sleep naturally for 7-8 hours, the more likely you will have good sleep and to prevent health risks or symptoms from sleep apnea. Even if you use it 4 hours it helps. Occasionally all of us are unable to use a medical therapy, in sleep apnea, it is not dangerous to miss one night.   Communicate. Call our skilled team on the number provided on the first day if your visit for problems that make it difficult to wear the device. Over 2 out of 3 patients can learn to wear the device long-term with help from our team. Remember to call our team or your sleep providers if you are unable to wear the device as we may have other solutions for those who cannot adapt to mask CPAP therapy. It is recommended that you sleep your sleep provider within the first 3 months and yearly after that if you are not having problems.   Use it for your health. We encourage use of CPAP masks during daytime quiet periods to allow your face and brain to adapt to the sensation of CPAP so that it will be a more natural sensation to awaken to at night or during naps. This can be very useful during the first few weeks or months of adapting to CPAP though it does not help medically to wear CPAP during wakefulness and  should not be used as a strategy just to meet guidelines.  Take care of your equipment. Make sure you clean your mask and tubing using directions every day and that your filter and mask are replaced as recommended or  if they are not working.     *Masks with magnets:  Updated Contraindications  Masks with magnetic components are contraindicated for use by patients where they, or anyone in close physical contact while using the mask, have the following:   Active medical implants that interact with magnets (i.e., pacemakers, implantable cardioverter defibrillators (ICD), neurostimulators, cerebrospinal fluid (CSF) shunts, insulin/infusion pumps)   Metallic implants/objects containing ferromagnetic material (i.e., aneurysm clips/flow disruption devices, embolic coils, stents, valves, electrodes, implants to restore hearing or balance with implanted magnets, ocular implants, metallic splinters in the eye)  Updated Warning  Keep the mask magnets at a safe distance of at least 6 inches (150 mm) away from implants or medical devices that may be adversely affected by magnetic interference. This warning applies to you or anyone in close physical contact with your mask. The magnets are in the frame and lower headgear clips, with a magnetic field strength of up to 400mT. When worn, they connect to secure the mask but may inadvertently detach while asleep.  Implants/medical devices, including those listed within contraindications, may be adversely affected if they change function under external magnetic fields or contain ferromagnetic materials that attract/repel to magnetic fields (some metallic implants, e.g., contact lenses with metal, dental implants, metallic cranial plates, screws, nadeen hole covers, and bone substitute devices). Consult your physician and  of your implant / other medical device for information on the potential adverse effects of magnetic fields.    BESIDES CPAP, WHAT OTHER THERAPIES ARE THERE?    Positioning Device  Positioning devices are generally used when sleep apnea is mild and only occurs on your back.This example shows a pillow that straps around the waist. It may be appropriate for those whose sleep  study shows milder sleep apnea that occurs primarily when lying flat on one's back. Preliminary studies have shown benefit but effectiveness at home may need to be verified by a home sleep test. These devices are generally not covered by medical insurance.  Examples of devices that maintain sleeping on the back to prevent snoring and mild sleep apnea.    Belt type body positioner  http://[employer].AudioMicro/    Electronic reminder  http://nightshifttherapy.com/            Oral Appliance  What is oral appliance therapy?  An oral appliance device fits on your teeth at night like a retainer used after having braces. The device is made by a specialized dentist and requires several visits over 1-2 months before a manufactured device is made to fit your teeth and is adjusted to prevent your sleep apnea. Once an oral device is working properly, snoring should be improved. A home sleep test may be recommended at that time if to determine whether the sleep apnea is adequately treated.       Some things to remember:  -Oral devices are often, but not always, covered by your medical insurance. Be sure to check with your insurance provider.   -If you are referred for oral therapy, you will be given a list of specialized dentists to consider or you may choose to visit the Web site of the American Academy of Dental Sleep Medicine  -Oral devices are less likely to work if you have severe sleep apnea or are extremely overweight.     More detailed information  An oral appliance is a small acrylic device that fits over the upper and lower teeth  (similar to a retainer or a mouth guard). This device slightly moves jaw forward, which moves the base of the tongue forward, opens the airway, improves breathing for effective treat snoring and obstructive sleep apnea in perhaps 7 out of 10 people .  The best working devices are custom-made by a dental device  after a mold is made of the teeth 1, 2, 3.  When is an oral appliance  indicated?  Oral appliance therapy is recommended as a first-line treatment for patients with primary snoring, mild sleep apnea, and for patients with moderate sleep apnea who prefer appliance therapy to use of CPAP4, 5. Severity of sleep apnea is determined by sleep testing and is based on the number of respiratory events per hour of sleep.   How successful is oral appliance therapy?  The success rate of oral appliance therapy in patients with mild sleep apnea is 75-80% while in patients with moderate sleep apnea it is 50-70%. The chance of success in patients with severe sleep apnea is 40-50%. The research also shows that oral appliances have a beneficial effect on the cardiovascular health of ALEXANDER patients at the same magnitude as CPAP therapy7.  Oral appliances should be a second-line treatment in cases of severe sleep apnea, but if not completely successful then a combination therapy utilizing CPAP plus oral appliance therapy may be effective. Oral appliances tend to be effective in a broad range of patients although studies show that the patients who have the highest success are females, younger patients, those with milder disease, and less severe obesity. 3, 6.   Finding a dentist that practices dental sleep medicine  Specific training is available through the American Academy of Dental Sleep Medicine for dentists interested in working in the field of sleep. To find a dentist who is educated in the field of sleep and the use of oral appliances, near you, visit the Web site of the American Academy of Dental Sleep Medicine.    References  1. Mariano et al. Objectively measured vs self-reported compliance during oral appliance therapy for sleep-disordered breathing. Chest 2013; 144(5): 7716-5131.  2. Ron, et al. Objective measurement of compliance during oral appliance therapy for sleep-disordered breathing. Thorax 2013; 68(1): 91-96.  3. Cynthia et al. Mandibular advancement devices in 620 men and  women with ALEXANDER and snoring: tolerability and predictors of treatment success. Chest 2004; 125: 3162-6400.  4. Cameron et al. Oral appliances for snoring and ALEXANDER: a review. Sleep 2006; 29: 244-262.  5. Parvez et al. Oral appliance treatment for ALEXANDER: an update. J Clin Sleep Med 2014; 10(2): 215-227.  6. Ciara et al. Predictors of OSAH treatment outcome. J Dent Res 2007; 86: 1480-9989.      Weight Loss:    Weight loss is a long-term strategy that may improve sleep apnea in some patients.    Weight management is a personal decision and the decision should be based on your interest and the potential benefits.  If you are interested in exploring weight loss strategies, the following discussion covers the impact on weight loss on sleep apnea and the approaches that may be successful.    Being overweight does not necessarily mean you will have health consequences.  Those who have BMI over 35 or over 27 with existing medical conditions carries greater risk.   Weight loss decreases severity of sleep apnea in most people with obesity. For those with mild obesity who have developed snoring with weight gain, even 15-30 pound weight loss can improve and occasionally eliminate sleep apnea.  Structured and life-long dietary and health habits are necessary to lose weight and keep healthier weight levels.     Though there may be significant health benefits from weight loss, long-term weight loss is very difficult to achieve- studies show success with dietary management in less than 10% of people. In addition, substantial weight loss may require years of dietary control and may be difficult if patients have severe obesity. In these cases, surgical management may be considered.  Finally, older individuals who have tolerated obesity without health complications may be less likely to benefit from weight loss strategies.      Body mass index is 26.55 kg/m .    Surgery:    Surgery for obstructive sleep apnea is considered  generally only when other therapies fail to work. Surgery may be discussed with you if you are having a difficult time tolerating CPAP and or when there is an abnormal structure that requires surgical correction.  Nose and throat surgeries often enlarge the airway to prevent collapse.  Most of these surgeries create pain for 1-2 weeks and up to half of the most common surgeries are not effective throughout life.  You should carefully discuss the benefits and drawbacks to surgery with your sleep provider and surgeon to determine if it is the best solution for you.   More information  Surgery for ALEXANDER is directed at areas that are responsible for narrowing or complete obstruction of the airway during sleep.  There are a wide range of procedures available to enlarge and/or stabilize the airway to prevent blockage of breathing in the three major areas where it can occur: the palate, tongue, and nasal regions.  Successful surgical treatment depends on the accurate identification of the factors responsible for obstructive sleep apnea in each person.  A personalized approach is required because there is no single treatment that works well for everyone.  Because of anatomic variation, consultation with an examination by a sleep surgeon is a critical first step in determining what surgical options are best for each patient.  In some cases, examination during sedation may be recommended in order to guide the selection of procedures.  Patients will be counseled about risks and benefits as well as the typical recovery course after surgery. Surgery is typically not a cure for a person s ALEXANDER.  However, surgery will often significantly improve one s ALEXANDER severity (termed  success rate ).  Even in the absence of a cure, surgery will decrease the cardiovascular risk associated with OSA7; improve overall quality of life8 (sleepiness, functionality, sleep quality, etc).      Palate Procedures:  Patients with ALEXANDER often have narrowing of  their airway in the region of their tonsils and uvula.  The goals of palate procedures are to widen the airway in this region as well as to help the tissues resist collapse.  Modern palate procedure techniques focus on tissue conservation and soft tissue rearrangement, rather than tissue removal.  Often the uvula is preserved in this procedure. Residual sleep apnea is common in patient after pharyngoplasty with an average reduction in sleep apnea events of 33%2.      Tongue Procedures:  ExamWhile patients are awake, the muscles that surround the throat are active and keep this region open for breathing. These muscles relax during sleep, allowing the tongue and other structures to collapse and block breathing.  There are several different tongue procedures available.  Selection of a tongue base procedure depends on characteristics seen on physical exam.  Generally, procedures are aimed at removing bulky tissues in this area or preventing the back of the tongue from falling back during sleep.  Success rates for tongue surgery range from 50-62%3.    Hypoglossal Nerve Stimulation:  Hypoglossal nerve stimulation has recently received approval from the [place] Food and Drug Administration for the treatment of obstructive sleep apnea.  This is based on research showing that the system was safe and effective in treating sleep apnea6.  Results showed that the median AHI score decreased 68%, from 29.3 to 9.0. This therapy uses an implant system that senses breathing patterns and delivers mild stimulation to airway muscles, which keeps the airway open during sleep.  The system consists of three fully implanted components: a small generator (similar in size to a pacemaker), a breathing sensor, and a stimulation lead.  Using a small handheld remote, a patient turns the therapy on before bed and off upon awakening.    Candidates for this device must be greater than 18 years of age, have moderate to severe obstructive sleep  apnea with less than 25% central events  (AHI between 15-65), BMI less than 35, have tried CPAP/oral appliance for at least 8 weeks without success, and have appropriate upper airway anatomy (determined by a sleep endoscopy performed by Dr. Chang Dai or Dr. Yimi Clark).    Nasal Procedures:  Nasal obstruction can interfere with nasal breathing during the day and night.  Studies have shown that relief of nasal obstruction can improve the ability of some patients to tolerate positive airway pressure therapy for obstructive sleep apnea1.  Treatment options include medications such as nasal saline, topical corticosteroid and antihistamine sprays, and oral medications such as antihistamines or decongestants. Non-surgical treatments can include external nasal dilators for selected patients. If these are not successful by themselves, surgery can improve the nasal airway either alone or in combination with these other options.        Combination Procedures:  Combination of surgical procedures and other treatments may be recommended, particularly if patients have more than one area of narrowing or persistent positional disease.  The success rate of combination surgery ranges from 66-80%2,3.    References  Suhail NETTLES. The Role of the Nose in Snoring and Obstructive Sleep Apnoea: An Update.  Eur Arch Otorhinolaryngol. 2011; 268: 1365-73.   Capmasood SM; Mart JA; Donte JR; Pallanch JF; Renetta MB; Jessy SG; Tyrese MCKEE. Surgical modifications of the upper airway for obstructive sleep apnea in adults: a systematic review and meta-analysis. SLEEP 2010;33(10):7340-0174. Yash MCCLELLAN. Hypopharyngeal surgery in obstructive sleep apnea: an evidence-based medicine review.  Arch Otolaryngol Head Neck Surg. 2006 Feb;132(2):206-13.  Zachariah YH1, Delisa Y, Crescencio SHERWIN. The efficacy of anatomically based multilevel surgery for obstructive sleep apnea. Otolaryngol Head Neck Surg. 2003 Oct;129(4):327-35.  Kezirian E, Goldberg A. Hypopharyngeal  Surgery in Obstructive Sleep Apnea: An Evidence-Based Medicine Review. Arch Otolaryngol Head Neck Surg. 2006 Feb;132(2):206-13.  Renetta PJ et al. Upper-Airway Stimulation for Obstructive Sleep Apnea.  N Engl J Med. 2014 Jan 9;370(2):139-49.  Cristal Y et al. Increased Incidence of Cardiovascular Disease in Middle-aged Men with Obstructive Sleep Apnea. Am J Respir Crit Care Med; 2002 166: 159-165  Sams EM et al. Studying Life Effects and Effectiveness of Palatopharyngoplasty (SLEEP) study: Subjective Outcomes of Isolated Uvulopalatopharyngoplasty. Otolaryngol Head Neck Surg. 2011; 144: 623-631.        WHAT IF I ONLY HAVE SNORING?    Mandibular advancement devices, lateral sleep positioning, long-term weight loss and treatment of nasal allergies have been shown to improve snoring.  Exercising tongue muscles with a game (https://[employer].ThinkNear/us/yann/soundly-reduce-snoring/ev4204709470) or stimulating the tongue during the day with a device (https://doi.org/10.3390/qxy95460534) have improved snoring in some individuals.  https://www.[employer].VerticalResponse/  https://www.sleepfoundation.org/best-anti-snoring-mouthpieces-and-mouthguards    Remember to Drive Safe... Drive Alive     Sleep health profoundly affects your health, mood, and your safety.  Thirty three percent of the population (one in three of us) is not getting enough sleep and many have a sleep disorder. Not getting enough sleep or having an untreated / undertreated sleep condition may make us sleepy without even knowing it. In fact, our driving could be dramatically impaired due to our sleep health. As your provider, here are some things I would like you to know about driving:     Here are some warning signs for impairment and dangerous drowsy driving:              -Having been awake more than 16 hours               -Looking tired               -Eyelid drooping              -Head nodding (it could be too late at this point)              -Driving for more than 30  minutes     Some things you could do to make the driving safer if you are experiencing some drowsiness:              -Stop driving and rest              -Call for transportation              -Make sure your sleep disorder is adequately treated     Some things that have been shown NOT to work when experiencing drowsiness while driving:              -Turning on the radio              -Opening windows              -Eating any  distracting  /  entertaining  foods (e.g., sunflower seeds, candy, or any other)              -Talking on the phone      Your decision may not only impact your life, but also the life of others. Please, remember to drive safe for yourself and all of us.

## 2024-02-09 NOTE — PROGRESS NOTES
Angeli Jacobson has an upcoming lab appointment:    Future Appointments   Date Time Provider Department Center   2/12/2024 10:15 AM [facility]   2/16/2024  9:30 AM Edel Mccarty MD [facility]   2/19/2024 10:40 AM Emilia Angulo MD [facility]   2/26/2024 12:30 PM Randell Mckee MD [facility]   2/27/2024 12:00 PM Jackson Bryant MD [facility]   4/9/2024 11:00 AM Leeann Sharma MD [facility]     Patient is scheduled for the following lab(s): Non-Fasting     There is no order available. Please review and place either future orders or HMPO (Review of Health Maintenance Protocol Orders), as appropriate.    There are no preventive care reminders to display for this patient.  Elvira Hodges

## 2024-02-09 NOTE — PROGRESS NOTES
Outpatient Sleep Medicine Consultation:      Name: Angeli Jacobson MRN# [id number]   Age: 73 year old YOB: 1950     Date of Consultation: February 9, 2024  Consultation is requested by: Diana Gabriela Crintea-Stoian,  [place] [address] [address]  Primary care provider: Edel Mccarty       Reason for Sleep Consult:     Angeli Jacobson is sent by Ed[address]-* for a sleep consultation regarding 1. Snoring    Patient s Reason for visit  Angeli Jacobson main reason for visit:  Excessive, disruptive snoring.  About 3 times a month waking up in the middle of the night and staying awake.  Patient states problem(s) started:  Snoring for years  Angeli Jacobson's goals for this visit:  Gain insight into this issue         Assessment and Plan:     1. Snoring  2. Witnessed apneic spells  3. Excessive daytime sleepiness    Patient is being evaluated for Obstructive Sleep Apnea (ALEXANDER).  Patient's risk factors for ALEXANDER include: Loud disruptive snoring, excessive daytime sleepiness (ESS 15), witnessed apneas, age >50, and postmenopausal status. We discussed pathophysiology of ALEXANDER and consequences of untreated moderate to severe sleep apnea such as a higher risk of hypertension, cardiovascular disease, cardiac arrhythmias, and stroke. Patient is interested in treatment and willing to undergo overnight sleep testing. Discussed testing with overnight attended polysomnography versus home sleep apnea testing.  Discussed either of these testing options is reasonable for her and patient wants to take the weekend to think over things, she will call and let me know what study she wants me to order on Monday of next week and appropriate orders will be placed.    Spent some time today discussing treatment options in the event that testing is positive and additional information given in patient instructions.  We discussed CPAP, oral appliance  "therapy, positional restriction, and surgical consult.  Open to either CPAP or oral appliance pending her severity of apnea seen.  She is comfortable with me sending her a Rewalon message of results after her sleep study and she is comfortable if I place empiric auto CPAP orders assuming testing is moderate to severe to expedite treatment set up. Will plan to see her back for follow-up 2-3 months after sleep study is completed to discuss progress with treatment.         History of Present Illness:     Angeli Jacobson is a 73-year-old female with hypothyroidism, goiter, PAD, OAB, breast cancer who presents to clinic today for evaluation of snoring.  Patient reports history of a sleep study completed ~20 years ago that was normal, does not recall where this was completed.    SLEEP-WAKE SCHEDULE:     Work/School Days: Patient goes to school/work:   No  Usually gets into bed at  930-10:00 PM  Takes patient about  20 minutes to fall asleep  Has trouble falling asleep   0 nights per week  Wakes up in the middle of the night once  Wakes up due to  use the restroom  She has trouble falling back asleep 4 times a week.   It usually takes 10 minutes to get back to sleep   If struggles to fall asleep will move to dimly lit room and do breathing exercises until sleepy  Patient is usually up at  7:30 AM  Uses alarm:  No    Weekends/Non-work Days/All Other Days:  Same as above    Patient estimates she gets 8-9 hours  sleep on average     Angeli Jacobson prefers to sleep in this position(s):   Side, back  Patient states they do the following activities in bed:  Read    Naps  Patient takes a purposeful nap 2-3 times a week and naps are usually an hour in duration, ends up napping around 2-2:30 PM where \"my body just stops and slows down and I have to lay down and rest\".  She feels better after a nap:  Yes  She dozes off unintentionally 0 days per week  Patient has had a driving accident or near-miss due to " sleepiness/drowsiness:  No      SLEEP DISRUPTIONS:    Breathing/Snoring  Patient snores: Yes  Other people complain about her snoring:  Yes  Patient has been told she stops breathing in her sleep: Yes  Denies gasping/choking arousal  She has issues with the following:  Morning mouth dryness, morning nasal congestion, heartburn or reflux at night  Denies morning headaches, getting up to urinate more than once    Movement:  Patient gets pain, discomfort, with an urge to move:   Yes-denies RLS symptoms but has some hip/knee pain, 2 prior hip surgeries and left knee/leg with lymphedema so can have discomfort during the night and needs to switch sleeping positions  It happens when she is resting:   No  It happens more at night:   Yes  It improves with movement: No  Patient has been told she kicks her legs at night:   No     Behaviors in Sleep:  Denies sleepwalking, sleep talking, sleep eating, bruxism, dream enactment, recurring nightmares, night terrors, sleep paralysis, hypnagogic/hypnopompic hallucinations, cataplexy    CAFFEINE AND OTHER SUBSTANCES:    Patient consumes caffeinated beverages per day:   1  Last caffeine use is usually:  11:00 AM  List of any prescribed or over the counter stimulants that patient takes:  None  List of any prescribed or over the counter sleep medication patient takes:  None  Patient drinks alcohol to help them sleep:  No  Patient drinks alcohol near bedtime:  No    Family History:  Patient has a family member been diagnosed with a sleep disorder:  No, though both parents snored      SCALES:    EPWORTH SLEEPINESS SCALE         2/9/2024    12:00 PM    Nicasio Sleepiness Scale ( AMY Rosas  [id number]<br>ESS - [place]/English - Final version - 21 Nov 07 - [facility] [place].)   Nicasio Score (Sleep) 15       INSOMNIA SEVERITY INDEX (ASHLEY)          2/9/2024    12:00 PM   Insomnia Severity Index (ASHLEY)   Difficulty falling asleep 1   Difficulty staying asleep 2   Problems waking up too early 0    How SATISFIED/DISSATISFIED are you with your CURRENT sleep pattern? 3   How NOTICEABLE to others do you think your sleep problem is in terms of impairing the quality of your life? 2   How WORRIED/DISTRESSED are you about your current sleep problem? 1   To what extent do you consider your sleep problem to INTERFERE with your daily functioning (e.g. daytime fatigue, mood, ability to function at work/daily chores, concentration, memory, mood, etc.) CURRENTLY? 3   ASHLEY Total Score 12       Guidelines for Scoring/Interpretation:  Total score categories:  0-7 = No clinically significant insomnia   8-14 = Subthreshold insomnia   15-21 = Clinical insomnia (moderate severity)  22-28 = Clinical insomnia (severe)  Used via courtesy of www.UMicItth.va.gov with permission from Camilo Fernandes PhD., [facility]      Allergies:    Allergies   Allergen Reactions    Celexa [Citalopram Hydrobromide]      Decreased libido       Medications:    Current Outpatient Medications   Medication Sig Dispense Refill    calcium carbonate 600 mg-vitamin D 400 units (CALTRATE) 600-400 MG-UNIT per tablet Take 1 chew tab by mouth daily      clopidogrel (PLAVIX) 75 MG tablet Take 1 tablet (75 mg) by mouth daily 90 tablet 3    denosumab (PROLIA) 60 MG/ML SOSY injection       evolocumab (REPATHA) 140 MG/ML prefilled autoinjector Inject 1 mL (140 mg) Subcutaneous every 14 days 6 mL 3    ezetimibe (ZETIA) 10 MG tablet Take 1 tablet (10 mg) by mouth daily 90 tablet 3    letrozole (FEMARA) 2.5 MG tablet Take 1 tablet (2.5 mg) by mouth daily 90 tablet 3    levothyroxine (SYNTHROID/LEVOTHROID) 75 MCG tablet Take 1 tablet (75 mcg) by mouth daily 90 tablet 3    magnesium oxide 200 MG TABS       multivitamin, therapeutic (THERA-VIT) TABS tablet Take 1 tablet by mouth daily      nitroFURantoin macrocrystal-monohydrate (MACROBID) 100 MG capsule Take 1 capsule after intercourse 30 capsule 0    rivaroxaban ANTICOAGULANT (XARELTO) 2.5 MG TABS tablet Take 1  tablet (2.5 mg) by mouth 2 times daily 180 tablet 3    rosuvastatin (CRESTOR) 20 MG tablet TAKE 1 TABLET BY MOUTH EVERY DAY 90 tablet 1    solifenacin (VESICARE) 5 MG tablet Take 1 tablet (5 mg) by mouth daily 90 tablet 3    sulfamethoxazole-trimethoprim (BACTRIM DS) 800-160 MG tablet Take 1 tablet by mouth 2 times daily (Patient not taking: Reported on 2/9/2024) 6 tablet 0       Problem List:  Patient Active Problem List    Diagnosis Date Noted    Left knee pain 11/16/2023     Priority: Medium    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 , 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 - s/p lumpectomy + radiation 01/05/2023     Priority: Medium     Complete prior to 10.16 Angulo      Hypothyroidism, unspecified type 12/08/2022     Priority: Medium    Arthrofibrosis of knee joint, left 05/31/2022     Priority: Medium    Elevated coronary artery calcium score=7/1/21 08/03/2021     Priority: Medium    Other specified injury of left quadriceps muscle, fascia and tendon, initial encounter 07/28/2021     Priority: Medium    Closed fracture of left tibia and fibula, initial encounter 12/19/2020     Priority: Medium    Closed displaced fracture of left patella, unspecified fracture morphology, initial encounter 12/19/2020     Priority: Medium    Acute pain of left knee 10/29/2019     Priority: Medium    S/P total hip arthroplasty 10/04/2019     Priority: Medium    Myalgia of pelvic floor 09/11/2019     Priority: Medium    Lesion of bladder 09/11/2019     Priority: Medium    Postural urinary incontinence 07/17/2019     Priority: Medium    Personal history of urinary tract infection 07/17/2019     Priority: Medium    OAB (overactive bladder) 07/17/2019     Priority: Medium    S/P ORIF (open reduction internal fixation) fracture 05/02/2019     Priority: Medium    Hip pain, left 05/02/2019     Priority: Medium    History of sarcoma 01/21/2019     Priority: Medium    Femoral-popliteal bypass graft occlusion, left (H24)  12/19/2018     Priority: Medium    Vascular graft occlusion (H24) 04/30/2018     Priority: Medium    PAD (peripheral artery disease) (H24) 04/20/2018     Priority: Medium    Vertigo 06/27/2017     Priority: Medium    Tubular adenoma 02/09/2016     Priority: Medium    Lymphedema of left leg 10/14/2014     Priority: Medium     Due to cancer      Osteoporosis 06/19/2008     Priority: Medium     Problem list name updated by automated process. 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     Priority: Medium     CHRONIC LEFT THIGH LYMPHEDEMA      Impaired fasting glucose 06/16/2010     Priority: Low    Hearing loss 01/29/2007     Priority: Low     Has hearing aids          Past Medical/Surgical History:  Past Medical History:   Diagnosis Date    * * * SBE PROPHYLAXIS * * * 1998    Amox 500mg, take 4 tabs one hour prior to procedure.Takes this because of lymphedema secondary from leg surgey    Antiplatelet or antithrombotic long-term use     Arrhythmia     Central serous retinopathy 2001    Resolved 9/2001    CHRONIC NECK PAIN 1995    Depressive disorder     Depressive disorder, not elsewhere classified 2001    Elevated coronary artery calcium score=7/1/21 08/03/2021    Elevated coronary artery calcium score=7/1/21 08/03/2021    History of blood transfusion 04/2019 12/2020    during left hip pinning, during surgery for patella    Lateral epicondylitis     MIXED HYPERLIPIDEMIA, LDL GOAL <160 1998    LDL goal < 160    Motion sickness      MYXOID LIPOSARCOMA 2000    Left thigh, S/P excision, radiation  at [facility]    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    CHRONIC LEFT THIGH LYMPHEDEMA    Nontoxic multinodular goiter 2005    needs yearly US    Osteoporosis, unspecified 2001    Other chronic pain     fx ribs left     Other lymphedema 2000    left thigh, gets regular PT for this    Overactive bladder     PAD (peripheral artery disease) (H24) 04/20/2018    PONV (postoperative nausea and vomiting)     SHINGLES 2001    Sprain of lumbosacral (joint) (ligament) 1995    right    Unspecified hearing loss 1998    chronic tinnitus    Unspecified tinnitus 1998     Past Surgical History:   Procedure Laterality Date    ARTHROPLASTY HIP Left 10/4/2019    Procedure: Removal of left femoral hardware with a conversion to left total hip arthroplasty using a Biomet Annalisa femoral stem with an OsseoTi acetabular shell and a dual mobility bearing surface;  Surgeon: Spencer Celeste MD;  Location: [facility] OR    BIOPSY  April 2000    BIOPSY BREAST SEED LOCALIZATION Right 12/14/2022    Procedure: right breast tag localized lumpectomy;  Surgeon: Shelly Carr MD;  Location:  OR    BIOPSY NODE SENTINEL Right 12/14/2022    Procedure: with right sentinel lymph node biopsy;  Surgeon: Shelly Carr MD;  Location:  OR    BYPASS GRAFT FEMOROPOPLITEAL Left 1/21/2019    Procedure: LEFT FEMORAL TO ABOVE KNEE POPLITEAL  BYPASS WITH POLYTETRAFLUOROETHYLENE GRAFT;  Surgeon: Shade Owens MD;  Location:  OR    COLONOSCOPY N/A 2/5/2016    Procedure: COMBINED COLONOSCOPY, SINGLE OR MULTIPLE BIOPSY/POLYPECTOMY BY BIOPSY;  Surgeon: Varun Stanley MD, MD;  Location:  GI    COLONOSCOPY N/A 12/10/2021    Procedure: COLONOSCOPY, WITH POLYPECTOMIES  USING COLD  SNARE,   CLIP APPLIED X2;  Surgeon: Dayton Luna MD;  Location:  GI    CYSTOSCOPY      EXCISION MALIG LESION>1.25CM  5/2000    Myxoid Liposarcoma      EXPLORE GROIN Right 5/1/2018    Procedure: EXPLORE GROIN;   EMERGENCY RIGHT FEMORAL EXPLORATION WITH FEMORAL ARTERY REPAIR.    EBL: 50mL;  Surgeon: Shade Owens MD;  Location: [facility] OR    HC COLP CERVIX/UPPER VAGINA  07/1997    Negative    HC DILATION/CURETTAGE DIAG/THER NON OB  02/1997    Post menopausal bleeding on HRT, negative    HIP SURGERY Left 04/13/2019    IR ANGIOGRAM THROUGH CATHETER FOLLOW UP  12/20/2018    IR ANGIOGRAM THROUGH CATHETER FOLLOW UP  12/21/2018    IR LOWER EXTREMITY ANGIOGRAM LEFT  12/19/2018    OPEN REDUCTION INTERNAL FIXATION PATELLA Left 12/21/2020    Procedure: Left partial patella fracture excision with advancement of the quadriceps tendon;  Surgeon: Stephen Rhodes MD;  Location: [facility] OR    OPEN REDUCTION INTERNAL FIXATION RODDING INTRAMEDULLARY TIBIA Left 12/21/2020    Procedure: Open reduction intramedullary nailing of left tibia fracture;  Surgeon: Stephen Rhodes MD;  Location: [facility] OR    REMOVE HARDWARE KNEE Left 5/31/2022    Procedure: Left knee patella hardware removal;  Surgeon: Spencer Celeste MD;  Location: [facility] OR    REPAIR TENDON QUADRICEPS Left 7/28/2021    Procedure: Left knee quadricepsplasty with intraoperative patellar fracture requiring open reduction and internal fixation of the patella;  Surgeon: Spencer Celeste MD;  Location: [facility] OR    REPAIR TENDON QUADRICEPS Left 5/31/2022    Procedure: Open left knee VY quadricepsplasty with hardware removal and allograft;  Surgeon: Spencer Celeste MD;  Location: [facility] OR    VASCULAR SURGERY  04/30/2018    Left SFA stent in bypass graft    ZZC APPENDECTOMY      Appendectomy    ZZC NONSPECIFIC PROCEDURE  04/2000    Open Biopsy Left Thigh Liposarcoma    ZZHC COLONOSCOPY THRU STOMA, DIAGNOSTIC  2006    due 2010       Social History:  Social History     Socioeconomic History    Marital status:      Spouse name: Chris    Number of children: 3    Years of education: 14    Highest education level: Associate degree: academic program   Occupational History     Occupation: at home     Employer: NONE    Tobacco Use    Smoking status: Never     Passive exposure: Never    Smokeless tobacco: Never   Vaping Use    Vaping Use: Never used   Substance and Sexual Activity    Alcohol use: Never    Drug use: Never    Sexual activity: Yes     Partners: Male     Birth control/protection: Male Surgical     Comment:  had a vasectomy   Other Topics Concern    Parent/sibling w/ CABG, MI or angioplasty before 65F 55M? No   Social History Narrative    Not on file     Social Determinants of Health     Financial Resource Strain: Low Risk  (10/1/2023)    Financial Resource Strain     Within the past 12 months, have you or your family members you live with been unable to get utilities (heat, electricity) when it was really needed?: No   Food Insecurity: Low Risk  (10/1/2023)    Food Insecurity     Within the past 12 months, did you worry that your food would run out before you got money to buy more?: No     Within the past 12 months, did the food you bought just not last and you didn t have money to get more?: No   Transportation Needs: Low Risk  (10/1/2023)    Transportation Needs     Within the past 12 months, has lack of transportation kept you from medical appointments, getting your medicines, non-medical meetings or appointments, work, or from getting things that you need?: No   Physical Activity: Inactive (12/16/2021)    Exercise Vital Sign     Days of Exercise per Week: 0 days     Minutes of Exercise per Session: 0 min   Stress: Stress Concern Present (12/16/2021)    [employer] - Occupational Stress Questionnaire     Feeling of Stress : To some extent   Social Connections: Moderately Integrated (12/16/2021)    Social Connection and Isolation Panel [NHANES]     Frequency of Communication with Friends and Family: Never     Frequency of Social Gatherings with Friends and Family: Never     Attends Buddhism Services: More than 4 times per year     Active  Member of Clubs or Organizations: Yes     Attends Club or Organization Meetings: Not on file     Marital Status:    Interpersonal Safety: Low Risk  (10/2/2023)    Interpersonal Safety     Do you feel physically and emotionally safe where you currently live?: Yes     Within the past 12 months, have you been hit, slapped, kicked or otherwise physically hurt by someone?: No     Within the past 12 months, have you been humiliated or emotionally abused in other ways by your partner or ex-partner?: No   Housing Stability: Low Risk  (10/1/2023)    Housing Stability     Do you have housing? : Yes     Are you worried about losing your housing?: No       Family History:  Family History   Problem Relation Age of Onset    C.A.D. Father         MI 57    Alcohol/Drug Father         etoh    Coronary Artery Disease Father     Obesity Mother     Osteoporosis Mother     Colon Cancer Brother 70    Hyperlipidemia Son     Anxiety Disorder Son     Hyperlipidemia Son         very high, experimental drug    C.A.D. Paternal Grandmother         ascvd    Diabetes Maternal Grandmother     Cancer Maternal Grandmother     C.A.D. Paternal Uncle         Mi  age 48    Cancer Maternal Aunt         pancreatic CA    Hyperlipidemia Son     Hyperlipidemia Cousin        Review of Systems:  A complete review of systems reviewed by me is negative with the exeption of what has been mentioned in the history of present illness.    Physical Examination:  Vitals: /76   Pulse 72   Wt 73.5 kg (162 lb)   LMP  (LMP Unknown)   SpO2 95%   BMI 26.55 kg/m    BMI= Body mass index is 26.55 kg/m .  General appearance: Awake, alert, cooperative. Well groomed. Sitting comfortably in chair. In no apparent distress.  HEENT: Head: Normocephalic, atraumatic. Eyes: PERRL. Conjunctiva clear. Sclera normal. Nose: External appearance normal. Oropharynx: Mallampati Classification:II. Tonsils:1  hidden by pillars.   Neck: Neck Cir (cm): 36 cm (2024 11:12  "AM)  Skin: No rashes or significant lesions.   Neurologic: Alert, oriented x3. No focal neurological deficit. Gait normal.   Psychiatric: Mood euthymic. Affect congruent with full range and intensity.         Data: All pertinent previous laboratory data reviewed     Recent Labs   Lab Test 12/21/23  1115 10/11/23  0937    142   POTASSIUM 4.3 4.1   CHLORIDE 103 106   CO2 28 26   ANIONGAP 8 10   GLC 60* 89   BUN 13.2 14.1   CR 0.66 0.72   LONG 9.9 10.0       Recent Labs   Lab Test 12/08/22  1735   WBC 5.6   RBC 4.51   HGB 13.5   HCT 42.5   MCV 94   MCH 29.9   MCHC 31.8   RDW 14.4          Recent Labs   Lab Test 12/21/23  1115 10/11/23  0937   PROTTOTAL  --  7.4   ALBUMIN 4.4 4.4   BILITOTAL  --  0.4   ALKPHOS 68 68   AST  --  33   ALT  --  19       TSH   Date Value   12/21/2023 0.63 uIU/mL   10/11/2023 1.23 uIU/mL   03/02/2022 0.59 mU/L   09/23/2021 0.79 mU/L   05/17/2021 4.19 mU/L (H)   06/09/2020 3.37 mU/L       No results found for: \"UAMP\", \"UBARB\", \"BENZODIAZEUR\", \"UCANN\", \"UCOC\", \"OPIT\", \"UPCP\"    Iron Saturation Index   Date/Time Value Ref Range Status   05/07/2018 01:31 PM 31 15 - 46 % Final     Ferritin   Date/Time Value Ref Range Status   05/07/2018 01:31 PM 77 8 - 252 ng/mL Final       No results found for: \"PH\", \"PHARTERIAL\", \"PO2\", \"QE7PEIFKIST\", \"SAT\", \"PCO2\", \"HCO3\", \"BASEEXCESS\", \"JERMAIN\", \"BEB\"    @LABRCNTIPR(phv:4,pco2v:4,po2v:4,hco3v:4,jena:4,o2per:4)@    Echocardiology: No results found for this or any previous visit (from the past 4320 hour(s)).    Chest x-ray: No results found for this or any previous visit from the past 365 days.      Chest CT: No results found for this or any previous visit from the past 365 days.      PFT: Most Recent [facility] Pulmonary Function Testing    No results found for: \"20001\"  No results found for: \"20002\"  No results found for: \"20003\"  No results found for: \"20015\"  No results found for: \"20016\"  No results found for: \"20027\"  No results found for: \"20028\"  No " "results found for: \"20029\"  No results found for: \"20079\"  No results found for: \"20080\"  No results found for: \"20081\"  No results found for: \"20335\"  No results found for: \"20105\"  No results found for: \"20053\"  No results found for: \"20054\"  No results found for: \"20055\"      Charley Martinez PA-C 2/9/2024     [facility] [place]  [address] [address]     [facility]  [address], [place] [address]    Chart documentation was completed, in part, with [employer] voice-recognition software. Even though reviewed, some grammatical, spelling, and word errors may remain.    71 minutes spent on day of encounter reviewing medical records, history and physical examination, review of previous testing and interpretation, documentation and further activities as noted above          "

## 2024-02-12 NOTE — PROGRESS NOTES
Blood test results December are in acceptable range, I am not sure what blood tests follow-up she needs    I recommend to keep the appointment for February 16 and then we will see which labs she needs follow-up on

## 2024-02-12 NOTE — PROGRESS NOTES
Call back from patient. She went into her [employer] account and found the following form 9/6/23 after visit summary:    Please make a lab appointment for non fasting labs in Jan - Feb to recheck PTH and vit D  5. Please make an appointment few days after the labs to discuss about the results.    Writer found that these orders were entered by Dr. Way on 9/6/23 but they were drawn at the same time as many other labs ordered by Dr. Bryant on 12/21/23. Advised labs were already completed and reviewed by Dr. Bryant so are no longer needed.

## 2024-02-13 NOTE — PROGRESS NOTES
[facility]    Hematology/Oncology Established Patient Note      Today's Date: 2/19/2024    Reason for follow-up:  Right breast cancer.    HISTORY OF PRESENT ILLNESS: Angeli Jacobson is a 73 year old female with history of left thigh liposarcoma status post excision in 2000 with subsequent left leg lymphedema and peripheral arterial disease status post redo left common femoral to popliteal artery bypass in January 2019, who presents with the following oncologic history:  1.  11/7/2022: Screening mammogram showed asymmetry in the right breast at 12:00, retroareolar far posterior.  Left breast negative.  2.  11/15/2022: Diagnostic right mammogram showed asymmetry in the posterior right breast, 8 cm from nipple.  Ultrasound of right breast at 12:00, 2 cm from nipple showed a small benign cyst but no definite sonographic correlate for the mammographic asymmetry.  3.  11/16/2022: Stereotactic guided right breast needle biopsy of 8 mm lesion at 3:00, 8 cm from the nipple showed grade 2 invasive ductal carcinoma, ER positive at %, PA positive at 60-70%, HER2 IHC = 2+ equivocal, HER2/compa FISH negative.  4. 12/14/2022: Underwent right breast lumpectomy with right axillary sentinel lymph node excision with Dr. Shelly Carr.  Pathology showed no residual invasive malignancy; 2 microscopic foci of DCIS, grade 2, largest measuring 1.2 mm; margins negative; total 2 right axillary lymph nodes negative.  5. 3/21/2023: Completed adjuvant radiation therapy to the right breast.   6. 4/10/2023: Started anastrozole. Discontinued 6/19/2023 due to weakness, joint stiffness, and brain fog.  7. 8/20/2023: Switched to letrozole.    INTERVAL HISTORY:  Angeli reports feeling overall well with no new pain. She reports some hair thinning.      REVIEW OF SYSTEMS:   14 point ROS was reviewed and is negative other than as noted above in HPI.       HOME MEDICATIONS:  Current Outpatient Medications   Medication Sig  Dispense Refill    calcium carbonate 600 mg-vitamin D 400 units (CALTRATE) 600-400 MG-UNIT per tablet Take 1 chew tab by mouth daily      clopidogrel (PLAVIX) 75 MG tablet Take 1 tablet (75 mg) by mouth daily 90 tablet 3    denosumab (PROLIA) 60 MG/ML SOSY injection       evolocumab (REPATHA) 140 MG/ML prefilled autoinjector Inject 1 mL (140 mg) Subcutaneous every 14 days 6 mL 3    ezetimibe (ZETIA) 10 MG tablet Take 1 tablet (10 mg) by mouth daily 90 tablet 3    letrozole (FEMARA) 2.5 MG tablet Take 1 tablet (2.5 mg) by mouth daily 90 tablet 3    levothyroxine (SYNTHROID/LEVOTHROID) 75 MCG tablet Take 1 tablet (75 mcg) by mouth daily 90 tablet 3    magnesium oxide 200 MG TABS       multivitamin, therapeutic (THERA-VIT) TABS tablet Take 1 tablet by mouth daily      nitroFURantoin macrocrystal-monohydrate (MACROBID) 100 MG capsule Take 1 capsule after intercourse 30 capsule 0    rivaroxaban ANTICOAGULANT (XARELTO) 2.5 MG TABS tablet Take 1 tablet (2.5 mg) by mouth 2 times daily 180 tablet 3    rosuvastatin (CRESTOR) 20 MG tablet TAKE 1 TABLET BY MOUTH EVERY DAY 90 tablet 1    solifenacin (VESICARE) 5 MG tablet Take 1 tablet (5 mg) by mouth daily 90 tablet 3    sulfamethoxazole-trimethoprim (BACTRIM DS) 800-160 MG tablet Take 1 tablet by mouth 2 times daily (Patient not taking: Reported on 2/9/2024) 6 tablet 0         ALLERGIES:  Allergies   Allergen Reactions    Celexa [Citalopram Hydrobromide]      Decreased libido         PAST MEDICAL HISTORY:  Past Medical History:   Diagnosis Date    * * * SBE PROPHYLAXIS * * * 1998    Amox 500mg, take 4 tabs one hour prior to procedure.Takes this because of lymphedema secondary from leg surgey    Antiplatelet or antithrombotic long-term use     Arrhythmia     Central serous retinopathy 2001    Resolved 9/2001    CHRONIC NECK PAIN 1995    Depressive disorder     Depressive disorder, not elsewhere classified 2001    Elevated coronary artery calcium score=7/1/21 08/03/2021     Elevated coronary artery calcium score=2021    History of blood transfusion 2019    during left hip pinning, during surgery for patella    Lateral epicondylitis     MIXED HYPERLIPIDEMIA, LDL GOAL <160     LDL goal < 160    Motion sickness     MYXOID LIPOSARCOMA 2000    Left thigh, S/P excision, radiation  at [facility]    Myxoid liposarcoma (HCC) 2004    CHRONIC LEFT THIGH LYMPHEDEMA    Nontoxic multinodular goiter 2005    needs yearly US    Osteoporosis, unspecified     Other chronic pain     fx ribs left     Other lymphedema 2000    left thigh, gets regular PT for this    Overactive bladder     PAD (peripheral artery disease) (H24) 2018    PONV (postoperative nausea and vomiting)     SHINGLES     Sprain of lumbosacral (joint) (ligament)     right    Unspecified hearing loss     chronic tinnitus    Unspecified tinnitus      Gynecologic history:  Age of menarche at 11, , age of first pregnancy at 29, 5 years of OCP use, no HRT or fertility treatment.    PAST SURGICAL HISTORY:  Past Surgical History:   Procedure Laterality Date    ARTHROPLASTY HIP Left 10/4/2019    Procedure: Removal of left femoral hardware with a conversion to left total hip arthroplasty using a Biomet Annalisa femoral stem with an OsseoTi acetabular shell and a dual mobility bearing surface;  Surgeon: Spencer Celeste MD;  Location: [facility] OR    BIOPSY  2000    BIOPSY BREAST SEED LOCALIZATION Right 2022    Procedure: right breast tag localized lumpectomy;  Surgeon: Shelly Carr MD;  Location:  OR    BIOPSY NODE SENTINEL Right 2022    Procedure: with right sentinel lymph node biopsy;  Surgeon: Shelly Carr MD;  Location:  OR    BYPASS GRAFT FEMOROPOPLITEAL Left 2019    Procedure: LEFT FEMORAL TO ABOVE KNEE POPLITEAL  BYPASS WITH POLYTETRAFLUOROETHYLENE GRAFT;  Surgeon: Shade Owens MD;  Location:  OR    COLONOSCOPY N/A 2016     Procedure: COMBINED COLONOSCOPY, SINGLE OR MULTIPLE BIOPSY/POLYPECTOMY BY BIOPSY;  Surgeon: Varun Stanley MD, MD;  Location:  GI    COLONOSCOPY N/A 12/10/2021    Procedure: COLONOSCOPY, WITH POLYPECTOMIES  USING COLD  SNARE,   CLIP APPLIED X2;  Surgeon: Dayton Luna MD;  Location:  GI    CYSTOSCOPY      EXCISION MALIG LESION>1.25CM  5/2000    Myxoid Liposarcoma      EXPLORE GROIN Right 5/1/2018    Procedure: EXPLORE GROIN;  EMERGENCY RIGHT FEMORAL EXPLORATION WITH FEMORAL ARTERY REPAIR.    EBL: 50mL;  Surgeon: Shade Owens MD;  Location:  OR    HC COLP CERVIX/UPPER VAGINA  07/1997    Negative    HC DILATION/CURETTAGE DIAG/THER NON OB  02/1997    Post menopausal bleeding on HRT, negative    HIP SURGERY Left 04/13/2019    IR ANGIOGRAM THROUGH CATHETER FOLLOW UP  12/20/2018    IR ANGIOGRAM THROUGH CATHETER FOLLOW UP  12/21/2018    IR LOWER EXTREMITY ANGIOGRAM LEFT  12/19/2018    OPEN REDUCTION INTERNAL FIXATION PATELLA Left 12/21/2020    Procedure: Left partial patella fracture excision with advancement of the quadriceps tendon;  Surgeon: Stephen Rhodes MD;  Location:  OR    OPEN REDUCTION INTERNAL FIXATION RODDING INTRAMEDULLARY TIBIA Left 12/21/2020    Procedure: Open reduction intramedullary nailing of left tibia fracture;  Surgeon: Stephen Rhodes MD;  Location:  OR    REMOVE HARDWARE KNEE Left 5/31/2022    Procedure: Left knee patella hardware removal;  Surgeon: Spencer Celeste MD;  Location:  OR    REPAIR TENDON QUADRICEPS Left 7/28/2021    Procedure: Left knee quadricepsplasty with intraoperative patellar fracture requiring open reduction and internal fixation of the patella;  Surgeon: Spencer Celeste MD;  Location:  OR    REPAIR TENDON QUADRICEPS Left 5/31/2022    Procedure: Open left knee VY quadricepsplasty with hardware removal and allograft;  Surgeon: Spencer Celeste MD;  Location:  OR    VASCULAR SURGERY  04/30/2018    Left SFA stent in  bypass graft    ZZC APPENDECTOMY      Appendectomy    ZZC NONSPECIFIC PROCEDURE  04/2000    Open Biopsy Left Thigh Liposarcoma    ZZHC COLONOSCOPY THRU STOMA, DIAGNOSTIC  2006 due 2010         SOCIAL HISTORY:  Social History     Socioeconomic History    Marital status:      Spouse name: Chris    Number of children: 3    Years of education: 14    Highest education level: Associate degree: academic program   Occupational History    Occupation: at home     Employer: NONE    Tobacco Use    Smoking status: Never     Passive exposure: Never    Smokeless tobacco: Never   Vaping Use    Vaping Use: Never used   Substance and Sexual Activity    Alcohol use: Never    Drug use: Never    Sexual activity: Yes     Partners: Male     Birth control/protection: Male Surgical     Comment:  had a vasectomy   Other Topics Concern    Parent/sibling w/ CABG, MI or angioplasty before 65F 55M? No   Social History Narrative    Not on file     Social Determinants of Health     Financial Resource Strain: Low Risk  (10/1/2023)    Financial Resource Strain     Within the past 12 months, have you or your family members you live with been unable to get utilities (heat, electricity) when it was really needed?: No   Food Insecurity: Low Risk  (10/1/2023)    Food Insecurity     Within the past 12 months, did you worry that your food would run out before you got money to buy more?: No     Within the past 12 months, did the food you bought just not last and you didn t have money to get more?: No   Transportation Needs: Low Risk  (10/1/2023)    Transportation Needs     Within the past 12 months, has lack of transportation kept you from medical appointments, getting your medicines, non-medical meetings or appointments, work, or from getting things that you need?: No   Physical Activity: Inactive (12/16/2021)    Exercise Vital Sign     Days of Exercise per Week: 0 days     Minutes of Exercise per Session: 0 min   Stress: Stress Concern  Present (2021)    [place] [employer] - Occupational Stress Questionnaire     Feeling of Stress : To some extent   Social Connections: Moderately Integrated (2021)    Social Connection and Isolation Panel [NHANES]     Frequency of Communication with Friends and Family: Never     Frequency of Social Gatherings with Friends and Family: Never     Attends Sikhism Services: More than 4 times per year     Active Member of Clubs or Organizations: Yes     Attends Club or Organization Meetings: Not on file     Marital Status:    Interpersonal Safety: Low Risk  (10/2/2023)    Interpersonal Safety     Do you feel physically and emotionally safe where you currently live?: Yes     Within the past 12 months, have you been hit, slapped, kicked or otherwise physically hurt by someone?: No     Within the past 12 months, have you been humiliated or emotionally abused in other ways by your partner or ex-partner?: No   Housing Stability: Low Risk  (10/1/2023)    Housing Stability     Do you have housing? : Yes     Are you worried about losing your housing?: No         FAMILY HISTORY:  Family History   Problem Relation Age of Onset    C.A.D. Father         MI 57    Alcohol/Drug Father         etoh    Coronary Artery Disease Father     Obesity Mother     Osteoporosis Mother     Colon Cancer Brother 70    Hyperlipidemia Son     Anxiety Disorder Son     Hyperlipidemia Son         very high, experimental drug    C.A.D. Paternal Grandmother         ascvd    Diabetes Maternal Grandmother     Cancer Maternal Grandmother     C.A.D. Paternal Uncle         Mi  age 48    Cancer Maternal Aunt         pancreatic CA    Hyperlipidemia Son     Hyperlipidemia Cousin      Brother with colon cancer.  Negative for breast, ovarian or prostate cancer.    PHYSICAL EXAM:  Vital signs:  /76   Pulse 69   Resp 16   Wt 72.6 kg (160 lb)   LMP  (LMP Unknown)   SpO2 98%   BMI 26.22 kg/m      GENERAL/CONSTITUTIONAL: No acute distress.  EYES: No erythema or scleral icterus.  ENT/MOUTH: Neck supple.  LYMPH: No cervical, supraclavicular, axillary adenopathy.   BREAST: No palpable masses in either breast. Nipples are everted bilaterally with no discharge. No erythema, ulceration, or dimpling of the skin.  RESPIRATORY: No audible cough or wheezing.   GASTROINTESTINAL: No hepatosplenomegaly, masses, or tenderness. No guarding.  No distention.  MUSCULOSKELETAL: Warm and well-perfused, no cyanosis, clubbing; chronic left lower extremity edema.  NEUROLOGIC: No focal motor deficits. Alert, oriented, answers questions appropriately.  INTEGUMENTARY: No rashes or jaundice.  GAIT: Steady, does not use assistive device    LABS:  CBC RESULTS:   Recent Labs   Lab Test 12/08/22  1735   WBC 5.6   RBC 4.51   HGB 13.5   HCT 42.5   MCV 94   MCH 29.9   MCHC 31.8   RDW 14.4         Last Comprehensive Metabolic Panel:  Sodium   Date Value Ref Range Status   12/21/2023 139 135 - 145 mmol/L Final     Comment:     Reference intervals for this test were updated on 09/26/2023 to more accurately reflect our healthy population. There may be differences in the flagging of prior results with similar values performed with this method. Interpretation of those prior results can be made in the context of the updated reference intervals.    05/17/2021 142 133 - 144 mmol/L Final     Potassium   Date Value Ref Range Status   12/21/2023 4.3 3.4 - 5.3 mmol/L Final   05/26/2022 3.9 3.4 - 5.3 mmol/L Final   05/17/2021 4.3 3.4 - 5.3 mmol/L Final     Chloride   Date Value Ref Range Status   12/21/2023 103 98 - 107 mmol/L Final   05/26/2022 106 94 - 109 mmol/L Final   05/17/2021 107 94 - 109 mmol/L Final     Carbon Dioxide   Date Value Ref Range Status   05/17/2021 32 20 - 32 mmol/L Final     Carbon Dioxide (CO2)   Date Value Ref Range Status   12/21/2023 28 22 - 29 mmol/L Final   05/26/2022 29 20 - 32 mmol/L Final     Anion Gap   Date Value  Ref Range Status   12/21/2023 8 7 - 15 mmol/L Final   05/26/2022 4 3 - 14 mmol/L Final   05/17/2021 3 3 - 14 mmol/L Final     Glucose   Date Value Ref Range Status   12/21/2023 60 (L) 70 - 99 mg/dL Final   06/01/2022 137 (H) 70 - 99 mg/dL Final   05/17/2021 78 70 - 99 mg/dL Final     Comment:     Fasting specimen     Urea Nitrogen   Date Value Ref Range Status   12/21/2023 13.2 8.0 - 23.0 mg/dL Final   05/26/2022 17 7 - 30 mg/dL Final   05/17/2021 13 7 - 30 mg/dL Final     Creatinine   Date Value Ref Range Status   12/21/2023 0.66 0.51 - 0.95 mg/dL Final   05/17/2021 0.75 0.52 - 1.04 mg/dL Final     GFR Estimate   Date Value Ref Range Status   12/21/2023 >90 >60 mL/min/1.73m2 Final   05/17/2021 81 >60 mL/min/[1.73_m2] Final     Comment:     Non  GFR Calc  Starting 12/18/2018, serum creatinine based estimated GFR (eGFR) will be   calculated using the Chronic Kidney Disease Epidemiology Collaboration   (CKD-EPI) equation.       Calcium   Date Value Ref Range Status   12/21/2023 9.9 8.8 - 10.2 mg/dL Final   05/17/2021 9.3 8.5 - 10.1 mg/dL Final     Bilirubin Total   Date Value Ref Range Status   10/11/2023 0.4 <=1.2 mg/dL Final   05/17/2021 0.4 0.2 - 1.3 mg/dL Final     Alkaline Phosphatase   Date Value Ref Range Status   12/21/2023 68 40 - 150 U/L Final     Comment:     Reference intervals for this test were updated on 11/14/2023 to more accurately reflect our healthy population. There may be differences in the flagging of prior results with similar values performed with this method. Interpretation of those prior results can be made in the context of the updated reference intervals.   05/17/2021 88 40 - 150 U/L Final     ALT   Date Value Ref Range Status   10/11/2023 19 0 - 50 U/L Final     Comment:     Reference intervals for this test were updated on 6/12/2023 to more accurately reflect our healthy population. There may be differences in the flagging of prior results with similar values performed  with this method. Interpretation of those prior results can be made in the context of the updated reference intervals.     05/17/2021 26 0 - 50 U/L Final     AST   Date Value Ref Range Status   10/11/2023 33 0 - 45 U/L Final     Comment:     Reference intervals for this test were updated on 6/12/2023 to more accurately reflect our healthy population. There may be differences in the flagging of prior results with similar values performed with this method. Interpretation of those prior results can be made in the context of the updated reference intervals.   05/17/2021 20 0 - 45 U/L Final       PATHOLOGY:  None new.    IMAGING:  Reviewed as per A/P.    ASSESSMENT/PLAN:  Angeli Jacobson is a 73 year old female with the following issues:  1. Pathologic prognostic stage IA, [id number], grade 2 invasive ductal carcinoma of the right upper outer breast, ER positive (%), KS positive (70%), HER2/compa FISH negative  -Angeli is s/p right lumpectomy and radiation completed 3/21/2023.    --She has no clinical evidence for recurrent breast cancer by physical exam or mammogram reviewed from 11/28/2023.  --I advised a total of 5 years of hormone blockade therapy.  --Due to increase in mental and physical fatigue, she switched from anastrozole to letrozole on 8/30/2023.  --She is tolerating letrozole relatively well so far with some right thumb joint pain alleviated with steroid injection.  --Tamoxifen would be less optimal given concern for increased risk of blood clots.  --I answered questions she had regarding hair thinning on AI therapy.  --Due for next annual bilateral mammogram for 11/2024.    2.  History of myxoid liposarcoma of left thigh  - Status post excision and radiation completed in 2000.  She did not receive chemotherapy for her liposarcoma.  This has resulted in left lower extremity lymphedema.  - Continue lymphedema therapy and compression stockings.    3.  Peripheral arterial disease  -Has history of  left lower extremity peripheral arterial disease and is status post femoral-popliteal bypass with later stent to bypass.  She is on aspirin and Plavix.    4. Osteoporosis  --History of taking alendronate, off for past year.  - DEXA scan from 12/22/2022 and 12/27/2023 showed osteoporosis. Machines were not the same and therefore results not comparable.  - Continue adequate calcium and vitamin D, denosumab/Prolia, next due today.  --May need repeat DEXA end of this year to compare to prior to ensure Prolia is improving her bone density.  --She has a follow-up with endocrinology soon.    Return in 4 months.    Emilia Angulo MD  Hematology/Oncology  [facility]    Total time spent today: 30 minutes in chart review, patient evaluation, counseling, documentation, test and/or medication/prescription orders, and coordination of care.

## 2024-02-13 NOTE — PROGRESS NOTES
ADDENDUM: Patient wanting to pursue HST, orders placed today.Charley Martinez PA-C on 2/13/2024 at 3:45 PM

## 2024-02-15 ENCOUNTER — MYC MEDICAL ADVICE (OUTPATIENT)
Dept: INTERNAL MEDICINE | Facility: CLINIC | Age: 74
End: 2024-02-15
Payer: COMMERCIAL

## 2024-02-16 ENCOUNTER — OFFICE VISIT (OUTPATIENT)
Dept: INTERNAL MEDICINE | Facility: CLINIC | Age: 74
End: 2024-02-16
Payer: COMMERCIAL

## 2024-02-16 VITALS
RESPIRATION RATE: 18 BRPM | DIASTOLIC BLOOD PRESSURE: 77 MMHG | SYSTOLIC BLOOD PRESSURE: 124 MMHG | TEMPERATURE: 97.7 F | WEIGHT: 160.7 LBS | HEART RATE: 85 BPM | OXYGEN SATURATION: 96 % | HEIGHT: 66 IN | BODY MASS INDEX: 25.83 KG/M2

## 2024-02-16 DIAGNOSIS — R29.898 WEAKNESS OF LEFT LOWER EXTREMITY: Primary | ICD-10-CM

## 2024-02-16 DIAGNOSIS — E21.3 HYPERPARATHYROIDISM (H): ICD-10-CM

## 2024-02-16 DIAGNOSIS — Z02.9 ADMINISTRATIVE ENCOUNTER: ICD-10-CM

## 2024-02-16 PROCEDURE — 99213 OFFICE O/P EST LOW 20 MIN: CPT | Performed by: INTERNAL MEDICINE

## 2024-02-16 ASSESSMENT — PAIN SCALES - GENERAL: PAINLEVEL: NO PAIN (0)

## 2024-02-16 NOTE — PROGRESS NOTES
Dr Way's note      Patient's instructions / PLAN:                                                        Plan:  Handicapped parking permit  2. No changes in meds  3. Next ANNUAL EXAM -- after July 20, 2024 ( no labs prior this yann)       ASSESSMENT & PLAN:                                                      (R29.898) Weakness of left lower extremity  (primary encounter diagnosis)  (Z02.9) Administrative encounter  Comment: She drives, no MVA in the last year, no syncope  Plan: I filled out handicap parking permit based on her left lower extremity.  She uses a cane when she walks       (E21.3) Hyperparathyroidism (H24) -- f/up w go, dr Rodriguez  Comment: We reviewed briefly the hyperparathyroidism.  The number is little elevated, but below 100.  She increased her vitamin D intake and the level is in normal range.  Plan: She will follow-up with endocrinology regarding this problem.        Chief complaint:                                                      Handicap parking permit,  Discussed recent blood test results    SUBJECTIVE:                                                    History of present illness:    Dr Angulo -- oncology  Dr Bridges -- dx the hyper PTH but then she left the practic     Handicap parking permit       Subjective   Angeli is a 73 year old, presenting for the following health issues:  Patient is being seen to review labs,=.      2/16/2024     9:09 AM   Additional Questions   Roomed by Val Agustin     History of Present Illness       Reason for visit:  Review blood work from Feb 12; follow up on the visit with Jackson Bryant Dec 21 and report on Dexa exam on wrist and hip.    She eats 2-3 servings of fruits and vegetables daily.She consumes 0 sweetened beverage(s) daily.She exercises with enough effort to increase her heart rate 9 or less minutes per day.  She exercises with enough effort to increase her heart rate 3 or less days per week.   She is taking medications regularly.    "          Hyperlipidemia Follow-Up    Are you regularly taking any medication or supplement to lower your cholesterol?   Yes- rosuvastatin  Are you having muscle aches or other side effects that you think could be caused by your cholesterol lowering medication?  No        Review of Systems:                                                      ROS: negative for fever, chills, cough, wheezes, chest pain, shortness of breath, vomiting, abdominal pain, leg swelling       OBJECTIVE:             Physical exam:  Blood pressure 124/77, pulse 85, temperature 97.7  F (36.5  C), temperature source Tympanic, resp. rate 18, height 1.664 m (5' 5.5\"), weight 72.9 kg (160 lb 11.2 oz), SpO2 96%, not currently breastfeeding.     NAD, appears comfortable    PMHx: reviewed  Past Medical History:   Diagnosis Date    * * * SBE PROPHYLAXIS * * * 1998    Amox 500mg, take 4 tabs one hour prior to procedure.Takes this because of lymphedema secondary from leg surgey    Antiplatelet or antithrombotic long-term use     Arrhythmia     Central serous retinopathy 2001    Resolved 9/2001    CHRONIC NECK PAIN 1995    Depressive disorder     Depressive disorder, not elsewhere classified 2001    Elevated coronary artery calcium score=7/1/21 08/03/2021    Elevated coronary artery calcium score=7/1/21 08/03/2021    History of blood transfusion 04/2019 12/2020    during left hip pinning, during surgery for patella    Lateral epicondylitis     MIXED HYPERLIPIDEMIA, LDL GOAL <160 1998    LDL goal < 160    Motion sickness     MYXOID LIPOSARCOMA 2000    Left thigh, S/P excision, radiation  at [facility]    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    CHRONIC LEFT THIGH LYMPHEDEMA    Nontoxic multinodular goiter 2005    needs yearly US    Osteoporosis, unspecified 2001    Other chronic pain     fx ribs left     Other lymphedema 2000    left thigh, gets regular PT for this    Overactive bladder     PAD (peripheral artery disease) (H24) 04/20/2018    PONV " (postoperative nausea and vomiting)     SHINGLES 2001    Sprain of lumbosacral (joint) (ligament) 1995    right    Unspecified hearing loss 1998    chronic tinnitus    Unspecified tinnitus 1998      PSHx: reviewed  Past Surgical History:   Procedure Laterality Date    ARTHROPLASTY HIP Left 10/4/2019    Procedure: Removal of left femoral hardware with a conversion to left total hip arthroplasty using a Biomet Annalisa femoral stem with an OsseoTi acetabular shell and a dual mobility bearing surface;  Surgeon: Spencer Celeste MD;  Location: [facility] OR    BIOPSY  April 2000    BIOPSY BREAST SEED LOCALIZATION Right 12/14/2022    Procedure: right breast tag localized lumpectomy;  Surgeon: Shelly Carr MD;  Location:  OR    BIOPSY NODE SENTINEL Right 12/14/2022    Procedure: with right sentinel lymph node biopsy;  Surgeon: Shelly Carr MD;  Location:  OR    BYPASS GRAFT FEMOROPOPLITEAL Left 1/21/2019    Procedure: LEFT FEMORAL TO ABOVE KNEE POPLITEAL  BYPASS WITH POLYTETRAFLUOROETHYLENE GRAFT;  Surgeon: Shade Owens MD;  Location:  OR    COLONOSCOPY N/A 2/5/2016    Procedure: COMBINED COLONOSCOPY, SINGLE OR MULTIPLE BIOPSY/POLYPECTOMY BY BIOPSY;  Surgeon: Varun Stanley MD, MD;  Location:  GI    COLONOSCOPY N/A 12/10/2021    Procedure: COLONOSCOPY, WITH POLYPECTOMIES  USING COLD  SNARE,   CLIP APPLIED X2;  Surgeon: Dayton Luna MD;  Location:  GI    CYSTOSCOPY      EXCISION MALIG LESION>1.25CM  5/2000    Myxoid Liposarcoma      EXPLORE GROIN Right 5/1/2018    Procedure: EXPLORE GROIN;  EMERGENCY RIGHT FEMORAL EXPLORATION WITH FEMORAL ARTERY REPAIR.    EBL: 50mL;  Surgeon: Shade Owens MD;  Location:  OR    HC COLP CERVIX/UPPER VAGINA  07/1997    Negative    HC DILATION/CURETTAGE DIAG/THER NON OB  02/1997    Post menopausal bleeding on HRT, negative    HIP SURGERY Left 04/13/2019    IR ANGIOGRAM THROUGH CATHETER FOLLOW UP  12/20/2018    IR ANGIOGRAM THROUGH CATHETER FOLLOW UP   12/21/2018    IR LOWER EXTREMITY ANGIOGRAM LEFT  12/19/2018    OPEN REDUCTION INTERNAL FIXATION PATELLA Left 12/21/2020    Procedure: Left partial patella fracture excision with advancement of the quadriceps tendon;  Surgeon: Stephen Rhodes MD;  Location: [facility] OR    OPEN REDUCTION INTERNAL FIXATION RODDING INTRAMEDULLARY TIBIA Left 12/21/2020    Procedure: Open reduction intramedullary nailing of left tibia fracture;  Surgeon: Stephen Rhodes MD;  Location: [facility] OR    REMOVE HARDWARE KNEE Left 5/31/2022    Procedure: Left knee patella hardware removal;  Surgeon: Spencer Celeste MD;  Location: [facility] OR    REPAIR TENDON QUADRICEPS Left 7/28/2021    Procedure: Left knee quadricepsplasty with intraoperative patellar fracture requiring open reduction and internal fixation of the patella;  Surgeon: Spencer Celeste MD;  Location: [facility] OR    REPAIR TENDON QUADRICEPS Left 5/31/2022    Procedure: Open left knee VY quadricepsplasty with hardware removal and allograft;  Surgeon: Spencer Celeste MD;  Location: [facility] OR    VASCULAR SURGERY  04/30/2018    Left SFA stent in bypass graft    ZZC APPENDECTOMY      Appendectomy    ZZC NONSPECIFIC PROCEDURE  04/2000    Open Biopsy Left Thigh Liposarcoma    ZZ COLONOSCOPY THRU STOMA, DIAGNOSTIC  2006    due 2010        Meds: reviewed  Current Outpatient Medications   Medication Sig Dispense Refill    calcium carbonate 600 mg-vitamin D 400 units (CALTRATE) 600-400 MG-UNIT per tablet Take 1 chew tab by mouth daily      clopidogrel (PLAVIX) 75 MG tablet Take 1 tablet (75 mg) by mouth daily 90 tablet 3    denosumab (PROLIA) 60 MG/ML SOSY injection       evolocumab (REPATHA) 140 MG/ML prefilled autoinjector Inject 1 mL (140 mg) Subcutaneous every 14 days 6 mL 3    ezetimibe (ZETIA) 10 MG tablet Take 1 tablet (10 mg) by mouth daily 90 tablet 3    letrozole (FEMARA) 2.5 MG tablet Take 1 tablet (2.5 mg) by mouth daily 90 tablet 3    levothyroxine (SYNTHROID/LEVOTHROID)  75 MCG tablet Take 1 tablet (75 mcg) by mouth daily 90 tablet 3    magnesium oxide 200 MG TABS       multivitamin, therapeutic (THERA-VIT) TABS tablet Take 1 tablet by mouth daily      nitroFURantoin macrocrystal-monohydrate (MACROBID) 100 MG capsule Take 1 capsule after intercourse 30 capsule 0    rivaroxaban ANTICOAGULANT (XARELTO) 2.5 MG TABS tablet Take 1 tablet (2.5 mg) by mouth 2 times daily 180 tablet 3    rosuvastatin (CRESTOR) 20 MG tablet TAKE 1 TABLET BY MOUTH EVERY DAY 90 tablet 1    solifenacin (VESICARE) 5 MG tablet Take 1 tablet (5 mg) by mouth daily 90 tablet 3       Soc Hx: reviewed  Fam Hx: reviewed      Chart documentation was completed, in part, with [employer] voice-recognition software. Even though reviewed, some grammatical, spelling, and word errors may remain.      Edel Way MD  Internal Medicine       Signed Electronically by: Edel Mccarty MD

## 2024-02-16 NOTE — PATIENT INSTRUCTIONS
Plan:  Handicapped parking permit  2. No changes in meds  3. Next ANNUAL EXAM -- after July 20, 2024 ( no labs prior this appointment )

## 2024-02-19 ENCOUNTER — ONCOLOGY VISIT (OUTPATIENT)
Dept: ONCOLOGY | Facility: CLINIC | Age: 74
End: 2024-02-19
Attending: INTERNAL MEDICINE
Payer: COMMERCIAL

## 2024-02-19 VITALS
OXYGEN SATURATION: 98 % | HEART RATE: 69 BPM | SYSTOLIC BLOOD PRESSURE: 115 MMHG | WEIGHT: 160 LBS | BODY MASS INDEX: 26.22 KG/M2 | DIASTOLIC BLOOD PRESSURE: 76 MMHG | RESPIRATION RATE: 16 BRPM

## 2024-02-19 DIAGNOSIS — Z17.0 MALIGNANT NEOPLASM OF UPPER-OUTER QUADRANT OF RIGHT BREAST IN FEMALE, ESTROGEN RECEPTOR POSITIVE (H): Primary | ICD-10-CM

## 2024-02-19 DIAGNOSIS — M81.0 AGE-RELATED OSTEOPOROSIS WITHOUT CURRENT PATHOLOGICAL FRACTURE: ICD-10-CM

## 2024-02-19 DIAGNOSIS — Z79.811 LONG TERM CURRENT USE OF AROMATASE INHIBITOR: ICD-10-CM

## 2024-02-19 DIAGNOSIS — C50.411 MALIGNANT NEOPLASM OF UPPER-OUTER QUADRANT OF RIGHT BREAST IN FEMALE, ESTROGEN RECEPTOR POSITIVE (H): Primary | ICD-10-CM

## 2024-02-19 LAB
CALCIUM SERPL-MCNC: 10.2 MG/DL (ref 8.8–10.2)
CREAT SERPL-MCNC: 0.76 MG/DL (ref 0.51–0.95)
EGFRCR SERPLBLD CKD-EPI 2021: 82 ML/MIN/1.73M2

## 2024-02-19 PROCEDURE — G0463 HOSPITAL OUTPT CLINIC VISIT: HCPCS | Mod: 25 | Performed by: INTERNAL MEDICINE

## 2024-02-19 PROCEDURE — 99214 OFFICE O/P EST MOD 30 MIN: CPT | Performed by: INTERNAL MEDICINE

## 2024-02-19 PROCEDURE — 250N000011 HC RX IP 250 OP 636: Mod: JZ | Performed by: INTERNAL MEDICINE

## 2024-02-19 PROCEDURE — 82565 ASSAY OF CREATININE: CPT | Performed by: INTERNAL MEDICINE

## 2024-02-19 PROCEDURE — G0463 HOSPITAL OUTPT CLINIC VISIT: HCPCS | Performed by: INTERNAL MEDICINE

## 2024-02-19 PROCEDURE — 96372 THER/PROPH/DIAG INJ SC/IM: CPT | Performed by: INTERNAL MEDICINE

## 2024-02-19 PROCEDURE — 82310 ASSAY OF CALCIUM: CPT | Performed by: INTERNAL MEDICINE

## 2024-02-19 PROCEDURE — 36415 COLL VENOUS BLD VENIPUNCTURE: CPT | Performed by: INTERNAL MEDICINE

## 2024-02-19 RX ORDER — ALBUTEROL SULFATE 0.83 MG/ML
2.5 SOLUTION RESPIRATORY (INHALATION)
OUTPATIENT
Start: 2024-04-02

## 2024-02-19 RX ORDER — EPINEPHRINE 1 MG/ML
0.3 INJECTION, SOLUTION INTRAMUSCULAR; SUBCUTANEOUS EVERY 5 MIN PRN
OUTPATIENT
Start: 2024-04-02

## 2024-02-19 RX ORDER — MEPERIDINE HYDROCHLORIDE 25 MG/ML
25 INJECTION INTRAMUSCULAR; INTRAVENOUS; SUBCUTANEOUS EVERY 30 MIN PRN
OUTPATIENT
Start: 2024-04-02

## 2024-02-19 RX ORDER — ALBUTEROL SULFATE 90 UG/1
1-2 AEROSOL, METERED RESPIRATORY (INHALATION)
Start: 2024-04-02

## 2024-02-19 RX ORDER — METHYLPREDNISOLONE SODIUM SUCCINATE 125 MG/2ML
125 INJECTION, POWDER, LYOPHILIZED, FOR SOLUTION INTRAMUSCULAR; INTRAVENOUS
Start: 2024-04-02

## 2024-02-19 RX ORDER — DIPHENHYDRAMINE HYDROCHLORIDE 50 MG/ML
50 INJECTION INTRAMUSCULAR; INTRAVENOUS
Start: 2024-04-02

## 2024-02-19 RX ADMIN — DENOSUMAB 60 MG: 60 INJECTION SUBCUTANEOUS at 11:50

## 2024-02-19 ASSESSMENT — PAIN SCALES - GENERAL: PAINLEVEL: NO PAIN (0)

## 2024-02-19 NOTE — LETTER
2/19/2024         RE: Angeli Jacobson  [id number]  [facility] [id number]        Dear Colleague,    Thank you for referring your patient, Angeli Jacobson, to the [facility]. Please see a copy of my visit note below.    [facility]    Hematology/Oncology Established Patient Note      Today's Date: 2/19/2024    Reason for follow-up:  Right breast cancer.    HISTORY OF PRESENT ILLNESS: Angeli Jacobson is a 73 year old female with history of left thigh liposarcoma status post excision in 2000 with subsequent left leg lymphedema and peripheral arterial disease status post redo left common femoral to popliteal artery bypass in January 2019, who presents with the following oncologic history:  1.  11/7/2022: Screening mammogram showed asymmetry in the right breast at 12:00, retroareolar far posterior.  Left breast negative.  2.  11/15/2022: Diagnostic right mammogram showed asymmetry in the posterior right breast, 8 cm from nipple.  Ultrasound of right breast at 12:00, 2 cm from nipple showed a small benign cyst but no definite sonographic correlate for the mammographic asymmetry.  3.  11/16/2022: Stereotactic guided right breast needle biopsy of 8 mm lesion at 3:00, 8 cm from the nipple showed grade 2 invasive ductal carcinoma, ER positive at %, NE positive at 60-70%, HER2 IHC = 2+ equivocal, HER2/compa FISH negative.  4. 12/14/2022: Underwent right breast lumpectomy with right axillary sentinel lymph node excision with Dr. Shelly Carr.  Pathology showed no residual invasive malignancy; 2 microscopic foci of DCIS, grade 2, largest measuring 1.2 mm; margins negative; total 2 right axillary lymph nodes negative.  5. 3/21/2023: Completed adjuvant radiation therapy to the right breast.   6. 4/10/2023: Started anastrozole. Discontinued 6/19/2023 due to weakness, joint stiffness, and brain fog.  7. 8/20/2023: Switched to letrozole.    INTERVAL  HISTORY:  Angeli reports feeling overall well with no new pain. She reports some hair thinning.      REVIEW OF SYSTEMS:   14 point ROS was reviewed and is negative other than as noted above in HPI.       HOME MEDICATIONS:  Current Outpatient Medications   Medication Sig Dispense Refill     calcium carbonate 600 mg-vitamin D 400 units (CALTRATE) 600-400 MG-UNIT per tablet Take 1 chew tab by mouth daily       clopidogrel (PLAVIX) 75 MG tablet Take 1 tablet (75 mg) by mouth daily 90 tablet 3     denosumab (PROLIA) 60 MG/ML SOSY injection        evolocumab (REPATHA) 140 MG/ML prefilled autoinjector Inject 1 mL (140 mg) Subcutaneous every 14 days 6 mL 3     ezetimibe (ZETIA) 10 MG tablet Take 1 tablet (10 mg) by mouth daily 90 tablet 3     letrozole (FEMARA) 2.5 MG tablet Take 1 tablet (2.5 mg) by mouth daily 90 tablet 3     levothyroxine (SYNTHROID/LEVOTHROID) 75 MCG tablet Take 1 tablet (75 mcg) by mouth daily 90 tablet 3     magnesium oxide 200 MG TABS        multivitamin, therapeutic (THERA-VIT) TABS tablet Take 1 tablet by mouth daily       nitroFURantoin macrocrystal-monohydrate (MACROBID) 100 MG capsule Take 1 capsule after intercourse 30 capsule 0     rivaroxaban ANTICOAGULANT (XARELTO) 2.5 MG TABS tablet Take 1 tablet (2.5 mg) by mouth 2 times daily 180 tablet 3     rosuvastatin (CRESTOR) 20 MG tablet TAKE 1 TABLET BY MOUTH EVERY DAY 90 tablet 1     solifenacin (VESICARE) 5 MG tablet Take 1 tablet (5 mg) by mouth daily 90 tablet 3     sulfamethoxazole-trimethoprim (BACTRIM DS) 800-160 MG tablet Take 1 tablet by mouth 2 times daily (Patient not taking: Reported on 2/9/2024) 6 tablet 0         ALLERGIES:  Allergies   Allergen Reactions     Celexa [Citalopram Hydrobromide]      Decreased libido         PAST MEDICAL HISTORY:  Past Medical History:   Diagnosis Date     * * * SBE PROPHYLAXIS * * * 1998    Amox 500mg, take 4 tabs one hour prior to procedure.Takes this because of lymphedema secondary from leg surgey      Antiplatelet or antithrombotic long-term use      Arrhythmia      Central serous retinopathy 2001    Resolved 2001     CHRONIC NECK PAIN      Depressive disorder      Depressive disorder, not elsewhere classified      Elevated coronary artery calcium score=2021     Elevated coronary artery calcium score=2021     History of blood transfusion 2019    during left hip pinning, during surgery for patella     Lateral epicondylitis      MIXED HYPERLIPIDEMIA, LDL GOAL <160     LDL goal < 160     Motion sickness      MYXOID LIPOSARCOMA 2000    Left thigh, S/P excision, radiation  at [facility]     Myxoid liposarcoma (HCC) 2004    CHRONIC LEFT THIGH LYMPHEDEMA     Nontoxic multinodular goiter 2005    needs yearly US     Osteoporosis, unspecified      Other chronic pain     fx ribs left      Other lymphedema 2000    left thigh, gets regular PT for this     Overactive bladder      PAD (peripheral artery disease) (H24) 2018     PONV (postoperative nausea and vomiting)      SHINGLES      Sprain of lumbosacral (joint) (ligament)     right     Unspecified hearing loss     chronic tinnitus     Unspecified tinnitus      Gynecologic history:  Age of menarche at 11, , age of first pregnancy at 29, 5 years of OCP use, no HRT or fertility treatment.    PAST SURGICAL HISTORY:  Past Surgical History:   Procedure Laterality Date     ARTHROPLASTY HIP Left 10/4/2019    Procedure: Removal of left femoral hardware with a conversion to left total hip arthroplasty using a Biomet Annalisa femoral stem with an OsseoTi acetabular shell and a dual mobility bearing surface;  Surgeon: Spencer Celeste MD;  Location: [facility] OR     BIOPSY  2000     BIOPSY BREAST SEED LOCALIZATION Right 2022    Procedure: right breast tag localized lumpectomy;  Surgeon: Shelly Carr MD;  Location: [facility] OR     BIOPSY NODE SENTINEL Right 2022    Procedure: with  right sentinel lymph node biopsy;  Surgeon: Shelly Carr MD;  Location:  OR     BYPASS GRAFT FEMOROPOPLITEAL Left 1/21/2019    Procedure: LEFT FEMORAL TO ABOVE KNEE POPLITEAL  BYPASS WITH POLYTETRAFLUOROETHYLENE GRAFT;  Surgeon: Shade Owens MD;  Location:  OR     COLONOSCOPY N/A 2/5/2016    Procedure: COMBINED COLONOSCOPY, SINGLE OR MULTIPLE BIOPSY/POLYPECTOMY BY BIOPSY;  Surgeon: Varun Stanley MD, MD;  Location:  GI     COLONOSCOPY N/A 12/10/2021    Procedure: COLONOSCOPY, WITH POLYPECTOMIES  USING COLD  SNARE,   CLIP APPLIED X2;  Surgeon: Dayton Luna MD;  Location:  GI     CYSTOSCOPY       EXCISION MALIG LESION>1.25CM  5/2000    Myxoid Liposarcoma       EXPLORE GROIN Right 5/1/2018    Procedure: EXPLORE GROIN;  EMERGENCY RIGHT FEMORAL EXPLORATION WITH FEMORAL ARTERY REPAIR.    EBL: 50mL;  Surgeon: Shade Owens MD;  Location:  OR     HC COLP CERVIX/UPPER VAGINA  07/1997    Negative     HC DILATION/CURETTAGE DIAG/THER NON OB  02/1997    Post menopausal bleeding on HRT, negative     HIP SURGERY Left 04/13/2019     IR ANGIOGRAM THROUGH CATHETER FOLLOW UP  12/20/2018     IR ANGIOGRAM THROUGH CATHETER FOLLOW UP  12/21/2018     IR LOWER EXTREMITY ANGIOGRAM LEFT  12/19/2018     OPEN REDUCTION INTERNAL FIXATION PATELLA Left 12/21/2020    Procedure: Left partial patella fracture excision with advancement of the quadriceps tendon;  Surgeon: Stephen Rhodes MD;  Location:  OR     OPEN REDUCTION INTERNAL FIXATION RODDING INTRAMEDULLARY TIBIA Left 12/21/2020    Procedure: Open reduction intramedullary nailing of left tibia fracture;  Surgeon: Stephen Rhodes MD;  Location:  OR     REMOVE HARDWARE KNEE Left 5/31/2022    Procedure: Left knee patella hardware removal;  Surgeon: Spencer Celeste MD;  Location:  OR     REPAIR TENDON QUADRICEPS Left 7/28/2021    Procedure: Left knee quadricepsplasty with intraoperative patellar fracture requiring open reduction and internal  fixation of the patella;  Surgeon: Spencer Celeste MD;  Location: [facility] OR     REPAIR TENDON QUADRICEPS Left 5/31/2022    Procedure: Open left knee VY quadricepsplasty with hardware removal and allograft;  Surgeon: Spencer Celeste MD;  Location: [facility] OR     VASCULAR SURGERY  04/30/2018    Left SFA stent in bypass graft     ZZC APPENDECTOMY      Appendectomy     ZZC NONSPECIFIC PROCEDURE  04/2000    Open Biopsy Left Thigh Liposarcoma     ZZHC COLONOSCOPY THRU STOMA, DIAGNOSTIC  2006    due 2010         SOCIAL HISTORY:  Social History     Socioeconomic History     Marital status:      Spouse name: Chris     Number of children: 3     Years of education: 14     Highest education level: Associate degree: academic program   Occupational History     Occupation: at home     Employer: NONE    Tobacco Use     Smoking status: Never     Passive exposure: Never     Smokeless tobacco: Never   Vaping Use     Vaping Use: Never used   Substance and Sexual Activity     Alcohol use: Never     Drug use: Never     Sexual activity: Yes     Partners: Male     Birth control/protection: Male Surgical     Comment:  had a vasectomy   Other Topics Concern     Parent/sibling w/ CABG, MI or angioplasty before 65F 55M? No   Social History Narrative     Not on file     Social Determinants of Health     Financial Resource Strain: Low Risk  (10/1/2023)    Financial Resource Strain      Within the past 12 months, have you or your family members you live with been unable to get utilities (heat, electricity) when it was really needed?: No   Food Insecurity: Low Risk  (10/1/2023)    Food Insecurity      Within the past 12 months, did you worry that your food would run out before you got money to buy more?: No      Within the past 12 months, did the food you bought just not last and you didn t have money to get more?: No   Transportation Needs: Low Risk  (10/1/2023)    Transportation Needs      Within the past 12 months, has  lack of transportation kept you from medical appointments, getting your medicines, non-medical meetings or appointments, work, or from getting things that you need?: No   Physical Activity: Inactive (12/16/2021)    Exercise Vital Sign      Days of Exercise per Week: 0 days      Minutes of Exercise per Session: 0 min   Stress: Stress Concern Present (12/16/2021)    [place] [employer] - Occupational Stress Questionnaire      Feeling of Stress : To some extent   Social Connections: Moderately Integrated (12/16/2021)    Social Connection and Isolation Panel [NHANES]      Frequency of Communication with Friends and Family: Never      Frequency of Social Gatherings with Friends and Family: Never      Attends [employer] Services: More than 4 times per year      Active Member of Clubs or Organizations: Yes      Attends Club or Organization Meetings: Not on file      Marital Status:    Interpersonal Safety: Low Risk  (10/2/2023)    Interpersonal Safety      Do you feel physically and emotionally safe where you currently live?: Yes      Within the past 12 months, have you been hit, slapped, kicked or otherwise physically hurt by someone?: No      Within the past 12 months, have you been humiliated or emotionally abused in other ways by your partner or ex-partner?: No   Housing Stability: Low Risk  (10/1/2023)    Housing Stability      Do you have housing? : Yes      Are you worried about losing your housing?: No         FAMILY HISTORY:  Family History   Problem Relation Age of Onset     C.A.D. Father         MI 57     Alcohol/Drug Father         etoh     Coronary Artery Disease Father      Obesity Mother      Osteoporosis Mother      Colon Cancer Brother 70     Hyperlipidemia Son      Anxiety Disorder Son      Hyperlipidemia Son         very high, experimental drug     C.A.D. Paternal Grandmother         ascvd     Diabetes Maternal Grandmother      Cancer Maternal Grandmother      C.A.D. Paternal  Uncle         Mi  age 48     Cancer Maternal Aunt         pancreatic CA     Hyperlipidemia Son      Hyperlipidemia Cousin      Brother with colon cancer.  Negative for breast, ovarian or prostate cancer.    PHYSICAL EXAM:  Vital signs:  /76   Pulse 69   Resp 16   Wt 72.6 kg (160 lb)   LMP  (LMP Unknown)   SpO2 98%   BMI 26.22 kg/m     GENERAL/CONSTITUTIONAL: No acute distress.  EYES: No erythema or scleral icterus.  ENT/MOUTH: Neck supple.  LYMPH: No cervical, supraclavicular, axillary adenopathy.   BREAST: No palpable masses in either breast. Nipples are everted bilaterally with no discharge. No erythema, ulceration, or dimpling of the skin.  RESPIRATORY: No audible cough or wheezing.   GASTROINTESTINAL: No hepatosplenomegaly, masses, or tenderness. No guarding.  No distention.  MUSCULOSKELETAL: Warm and well-perfused, no cyanosis, clubbing; chronic left lower extremity edema.  NEUROLOGIC: No focal motor deficits. Alert, oriented, answers questions appropriately.  INTEGUMENTARY: No rashes or jaundice.  GAIT: Steady, does not use assistive device    LABS:  CBC RESULTS:   Recent Labs   Lab Test 22  1735   WBC 5.6   RBC 4.51   HGB 13.5   HCT 42.5   MCV 94   MCH 29.9   MCHC 31.8   RDW 14.4         Last Comprehensive Metabolic Panel:  Sodium   Date Value Ref Range Status   2023 139 135 - 145 mmol/L Final     Comment:     Reference intervals for this test were updated on 2023 to more accurately reflect our healthy population. There may be differences in the flagging of prior results with similar values performed with this method. Interpretation of those prior results can be made in the context of the updated reference intervals.    2021 142 133 - 144 mmol/L Final     Potassium   Date Value Ref Range Status   2023 4.3 3.4 - 5.3 mmol/L Final   2022 3.9 3.4 - 5.3 mmol/L Final   2021 4.3 3.4 - 5.3 mmol/L Final     Chloride   Date Value Ref Range Status    12/21/2023 103 98 - 107 mmol/L Final   05/26/2022 106 94 - 109 mmol/L Final   05/17/2021 107 94 - 109 mmol/L Final     Carbon Dioxide   Date Value Ref Range Status   05/17/2021 32 20 - 32 mmol/L Final     Carbon Dioxide (CO2)   Date Value Ref Range Status   12/21/2023 28 22 - 29 mmol/L Final   05/26/2022 29 20 - 32 mmol/L Final     Anion Gap   Date Value Ref Range Status   12/21/2023 8 7 - 15 mmol/L Final   05/26/2022 4 3 - 14 mmol/L Final   05/17/2021 3 3 - 14 mmol/L Final     Glucose   Date Value Ref Range Status   12/21/2023 60 (L) 70 - 99 mg/dL Final   06/01/2022 137 (H) 70 - 99 mg/dL Final   05/17/2021 78 70 - 99 mg/dL Final     Comment:     Fasting specimen     Urea Nitrogen   Date Value Ref Range Status   12/21/2023 13.2 8.0 - 23.0 mg/dL Final   05/26/2022 17 7 - 30 mg/dL Final   05/17/2021 13 7 - 30 mg/dL Final     Creatinine   Date Value Ref Range Status   12/21/2023 0.66 0.51 - 0.95 mg/dL Final   05/17/2021 0.75 0.52 - 1.04 mg/dL Final     GFR Estimate   Date Value Ref Range Status   12/21/2023 >90 >60 mL/min/1.73m2 Final   05/17/2021 81 >60 mL/min/[1.73_m2] Final     Comment:     Non  GFR Calc  Starting 12/18/2018, serum creatinine based estimated GFR (eGFR) will be   calculated using the Chronic Kidney Disease Epidemiology Collaboration   (CKD-EPI) equation.       Calcium   Date Value Ref Range Status   12/21/2023 9.9 8.8 - 10.2 mg/dL Final   05/17/2021 9.3 8.5 - 10.1 mg/dL Final     Bilirubin Total   Date Value Ref Range Status   10/11/2023 0.4 <=1.2 mg/dL Final   05/17/2021 0.4 0.2 - 1.3 mg/dL Final     Alkaline Phosphatase   Date Value Ref Range Status   12/21/2023 68 40 - 150 U/L Final     Comment:     Reference intervals for this test were updated on 11/14/2023 to more accurately reflect our healthy population. There may be differences in the flagging of prior results with similar values performed with this method. Interpretation of those prior results can be made in the context of  the updated reference intervals.   05/17/2021 88 40 - 150 U/L Final     ALT   Date Value Ref Range Status   10/11/2023 19 0 - 50 U/L Final     Comment:     Reference intervals for this test were updated on 6/12/2023 to more accurately reflect our healthy population. There may be differences in the flagging of prior results with similar values performed with this method. Interpretation of those prior results can be made in the context of the updated reference intervals.     05/17/2021 26 0 - 50 U/L Final     AST   Date Value Ref Range Status   10/11/2023 33 0 - 45 U/L Final     Comment:     Reference intervals for this test were updated on 6/12/2023 to more accurately reflect our healthy population. There may be differences in the flagging of prior results with similar values performed with this method. Interpretation of those prior results can be made in the context of the updated reference intervals.   05/17/2021 20 0 - 45 U/L Final       PATHOLOGY:  None new.    IMAGING:  Reviewed as per A/P.    ASSESSMENT/PLAN:  Angeli Jacobson is a 73 year old female with the following issues:  1. Pathologic prognostic stage IA, [id number], grade 2 invasive ductal carcinoma of the right upper outer breast, ER positive (%), KS positive (70%), HER2/compa FISH negative  -Angeli is s/p right lumpectomy and radiation completed 3/21/2023.    --She has no clinical evidence for recurrent breast cancer by physical exam or mammogram reviewed from 11/28/2023.  --I advised a total of 5 years of hormone blockade therapy.  --Due to increase in mental and physical fatigue, she switched from anastrozole to letrozole on 8/30/2023.  --She is tolerating letrozole relatively well so far with some right thumb joint pain alleviated with steroid injection.  --Tamoxifen would be less optimal given concern for increased risk of blood clots.  --I answered questions she had regarding hair thinning on AI therapy.  --Due for next annual  bilateral mammogram for 11/2024.    2.  History of myxoid liposarcoma of left thigh  - Status post excision and radiation completed in 2000.  She did not receive chemotherapy for her liposarcoma.  This has resulted in left lower extremity lymphedema.  - Continue lymphedema therapy and compression stockings.    3.  Peripheral arterial disease  -Has history of left lower extremity peripheral arterial disease and is status post femoral-popliteal bypass with later stent to bypass.  She is on aspirin and Plavix.    4. Osteoporosis  --History of taking alendronate, off for past year.  - DEXA scan from 12/22/2022 and 12/27/2023 showed osteoporosis. Machines were not the same and therefore results not comparable.  - Continue adequate calcium and vitamin D, denosumab/Prolia, next due today.  --May need repeat DEXA end of this year to compare to prior to ensure Prolia is improving her bone density.  --She has a follow-up with endocrinology soon.    Return in 4 months.    Emilia Angulo MD  Hematology/Oncology  [facility]    Total time spent today: 30 minutes in chart review, patient evaluation, counseling, documentation, test and/or medication/prescription orders, and coordination of care.       Again, thank you for allowing me to participate in the care of your patient.        Sincerely,        Emilia Angulo MD

## 2024-02-26 ENCOUNTER — OFFICE VISIT (OUTPATIENT)
Dept: PHYSICAL MEDICINE AND REHAB | Facility: CLINIC | Age: 74
End: 2024-02-26
Payer: COMMERCIAL

## 2024-02-26 VITALS
OXYGEN SATURATION: 97 % | SYSTOLIC BLOOD PRESSURE: 125 MMHG | DIASTOLIC BLOOD PRESSURE: 82 MMHG | RESPIRATION RATE: 16 BRPM | HEART RATE: 69 BPM

## 2024-02-26 DIAGNOSIS — M25.662 DECREASED RANGE OF MOTION (ROM) OF LEFT KNEE: ICD-10-CM

## 2024-02-26 DIAGNOSIS — Z98.890 HX OF LEFT KNEE SURGERY: Primary | ICD-10-CM

## 2024-02-26 DIAGNOSIS — M25.462 EFFUSION, LEFT KNEE: ICD-10-CM

## 2024-02-26 DIAGNOSIS — I89.0 LYMPHEDEMA OF LEFT LEG: ICD-10-CM

## 2024-02-26 DIAGNOSIS — Z85.831 HISTORY OF SARCOMA OF SOFT TISSUE: ICD-10-CM

## 2024-02-26 PROCEDURE — 99214 OFFICE O/P EST MOD 30 MIN: CPT | Performed by: PHYSICAL MEDICINE & REHABILITATION

## 2024-02-26 NOTE — NURSING NOTE
Chief Complaint   Patient presents with    RECHECK     Here for consult, confirmed with patient     Sharlene Cerna

## 2024-02-26 NOTE — PROGRESS NOTES
PM&R Follow-Up Visit -     Date of Initial Visit: 01/09/2024  LOV: 01/09/2024  TD: 2/26/2024     Recall: Angeli Jacobson is a 73 year old female with history of left thigh liposarcoma s/p resection/radiation therapy (2000), left patellar fracture repair and tibial nailing after fall (2020), quadricepsplasty with subsequent patellar fracture (2021, revision 2022), left lower extremity lymphedema who follows up for limited knee range of motion    INTERVAL HISTORY:  Patient was last seen January 9, 2024.  She is accompanied today by her partner today.  At that visit, the plan was to schedule her for follow-up ultrasound evaluation of her left knee to see if there was additional intra-articular fluid collection contributing to her limited range of motion.  Patient was out of town for several weeks in [place] and returned for evaluation.  She continues to deny any significant pain complaints affecting her knee.  She works with the cancer rehabilitation clinic for lymphedema therapy and management including compression.     RECALL HISTORY OF PRESENT ILLNESS:  Angeli Jacobson is a 73 year old female who presents with a chief complaint of L knee swelling.     She has a complex history of LLE lymphedema following L thigh liposarcoma resection/radiation (2000),  L patellar fracture repair and tibial nailing after fall on driveway (2020), quadricepsplasty with another patellar fracture (2021, revision 2022). No pain but has limited flexion of the knee and there has been a question raised as to if this could be associated with a joint effusion. Her limited ROM has been slowly progressive since her first knee surgery. Lymphedema is managed with compression/manual drainage/therapy, but makes it difficult to say if there is an actual effusion Has not noticed any changes in ROM when her LLE lymphedema fluctuations. No numbness/tingling, mechanical symptoms, or giving out.    Functional limitations: Difficulty getting  in/out of car. Uses straight cane for balance in the setting of impaired sensation      PRIOR INJURIES/TREATMENT:   Ice/Heat: yes - post op  Brace: none since surgery  Physical Therapy: yes - post op     - Current Pain Medications -   None for this problem    - Prior/Trialed Pain Medications -   None for this problem    Prior Procedures:  Date      Procedure     Improvement (%)  none         Prior Related Surgery: Multiple surgeries on LLE and externsor mechanism, including L thigh liposarcoma resection/radiation (2000),  L patellar fracture repair and tibial nailing after fall on driveway (2020), quadricepsplasty with another patellar fracture (2021, revision 2022)     Other (acupuncture, OMT, CMM, TENS, DME, etc.): none    Specialists Seen - (with most recent, available notes and clinic visits reviewed)   1. Cancer rehab/Lymphedema (Dr Sharma) - 10/10/23   2. [facility]     IMAGING - reviewed   5/31/2022 LEFT KNEE PORTABLE ONE TO TWO VIEWS                                                                    IMPRESSION: Previously seen figure-of-eight tension banding across the  left patella has been intervally removed. Superior to inferior  directed lag screws across the patella with superior washers are  unchanged. Chronic healed left patella fracture deformity. Fractured  screw in the distal left femur is unchanged. Previous nailing of the  left tibia, similar to prior. No acute left knee fracture or  dislocation. Left knee joint spaces are unchanged. Chronic bone  fragment along the cephalad left patella is unchanged. Soft tissue gas  is seen, expected immediate postoperative. Vertically oriented  anterior left knee skin staples midline. No left knee soft tissue  swelling.    Review Of Systems:  I am responding to those symptoms which are directly relevant to the specific indication for my consultation. I recommend that the patient follow up with their primary or referring provider to pursue any other symptoms which may  be of concern.       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     She  has a past surgical history that includes APPENDECTOMY; DILATION/CURETTAGE DIAG/THER NON OB (02/1997); COLP CERVIX/UPPER VAGINA (07/1997); NONSPECIFIC PROCEDURE (04/2000); excision malig lesion>1.25cm (5/2000); COLONOSCOPY THRU STOMA, DIAGNOSTIC (2006); Colonoscopy (N/A, 2/5/2016); Explore groin (Right, 5/1/2018); vascular surgery (04/30/2018); IR Angiogram through Catheter Follow Up (12/20/2018); IR Lower Extremity Angiogram Left (12/19/2018); IR Angiogram through Catheter Follow Up (12/21/2018); Bypass graft femoropopliteal (Left, 1/21/2019); hip surgery (Left, 04/13/2019); Cystoscopy; Arthroplasty hip (Left, 10/4/2019); Open reduction internal fixation rodding intramedullary tibia (Left, 12/21/2020); Open reduction internal fixation patella (Left, 12/21/2020); Repair tendon quadriceps (Left, 7/28/2021); Colonoscopy (N/A, 12/10/2021); biopsy (April 2000); Repair tendon quadriceps (Left, 5/31/2022); Remove hardware knee (Left, 5/31/2022);  Biopsy Breast Seed Localization (Right, 12/14/2022); and Biopsy node sentinel (Right, 12/14/2022).    Family History  Her family history includes Alcohol/Drug in her father; Anxiety Disorder in her son; C.A.D. in her father, paternal grandmother, and paternal uncle; Cancer in her maternal aunt and maternal grandmother; Colon Cancer (age of onset: 70) in her brother; Coronary Artery Disease in her father; Diabetes in her maternal grandmother; Hyperlipidemia in her cousin, son, son, and son; Obesity in her mother; Osteoporosis in her mother.     Social History:  Work: retired  History of any legal related pain issues: none  Current living situation: lives with   She  reports that she has never smoked. She has never been exposed to tobacco smoke. She has never used smokeless tobacco. She reports that she does not drink alcohol and does not use drugs.        Current Medications:   She has a current medication list which includes the following prescription(s): calcium carbonate 600 mg-vitamin d 400 units, clopidogrel, denosumab, evolocumab, ezetimibe, letrozole, levothyroxine, magnesium oxide, multivitamin, therapeutic, nitrofurantoin macrocrystal-monohydrate, rivaroxaban anticoagulant, rosuvastatin, and solifenacin, and the following Facility-Administered Medications: ropivacaine, lidocaine, and triamcinolone.       Allergies:    -- Celexa [Citalopram Hydrobromide]     --  Decreased libido    PHYSICAL EXAMINATION:  /82 (BP Location: Right arm, Patient Position: Sitting, Cuff Size: Adult Large)   Pulse 69   Resp 16   LMP  (LMP Unknown)   SpO2 97%    General: Pleasant, straightforward, WDWN individual.  Mental Status: Pleasant, direct, appropriate mood and affect  Resp: breathing is unlabored without audible wheeze  Vascular: No visible cyanosis, no venous stasis changes  Heme: No visible ecchymosis or erythema on extremities  Skin: No notable rash    Musculoskeletal:  Well healed surgical scars on knee  and thigh. ROM 0-90 deg. Overall non-tender over bony landmarks.      ASSESSMENT/PLAN:  Angeli Jacobson is a pleasant 73 year old female who presents with:    #.  LEFT thigh liposarcoma s/p resection/radiation therapy (2000), left patellar fracture repair and tibial nailing after fall (2020), quadricepsplasty with subsequent patellar fracture (2021, revision 2022)  #.  Left lower extremity lymphedema  #.  Age-related osteoporosis    On limited diagnostic ultrasound evaluation, there was only trace amount of fluid  extending into the suprapatellar joint capsule which is likely physiologic in nature.  She did not have significant joint effusion noted for aspiration.  She did have notable compressible fluid between the fascial planes of the thigh and quadriceps muscle which is consistent with her overall presentation of lymphedema.  As such, we did not inject or aspirate into the joint.      There is no injection or interventional procedure that I would have here that could expedite matters. Education was given in abundance. Empathy was expressed. There were no barriers to our conversation.      I will communicate this with her treating cancer rehabilitation physician, Dr. Sharma.     Ready to learn, no apparent learning barriers.  Education provided on treatment plan according to patient's preferred learning style.  Patient verbalizes understanding.   ________________________________   Randell Mckee MD  Department of Physical Medicine & Rehabilitation

## 2024-02-26 NOTE — Clinical Note
Raudel Bradshaw, hope you're well. I did evaluate this patient's knee with ultrasound, unfortunately, there was no significant joint effusion that I think would benefit from aspiration to help with her range of motion at this time. Thanks for the referral.

## 2024-02-26 NOTE — LETTER
2/26/2024       RE: Angeli Jacobson  [id number]  [facility] [address]     Dear Colleague,    Thank you for referring your patient, Angeli Jacobson, to the [facility] [place] at [facility]. Please see a copy of my visit note below.    PM&R Follow-Up Visit -     Date of Initial Visit: 01/09/2024  LOV: 01/09/2024  TD: 2/26/2024     Recall: Angeli Jacobson is a 73 year old female with history of left thigh liposarcoma s/p resection/radiation therapy (2000), left patellar fracture repair and tibial nailing after fall (2020), quadricepsplasty with subsequent patellar fracture (2021, revision 2022), left lower extremity lymphedema who follows up for limited knee range of motion    INTERVAL HISTORY:  Patient was last seen January 9, 2024.  She is accompanied today by her partner today.  At that visit, the plan was to schedule her for follow-up ultrasound evaluation of her left knee to see if there was additional intra-articular fluid collection contributing to her limited range of motion.  Patient was out of town for several weeks in [place] and returned for evaluation.  She continues to deny any significant pain complaints affecting her knee.  She works with the cancer rehabilitation clinic for lymphedema therapy and management including compression.     RECALL HISTORY OF PRESENT ILLNESS:  Angeli Jacobson is a 73 year old female who presents with a chief complaint of L knee swelling.     She has a complex history of LLE lymphedema following L thigh liposarcoma resection/radiation (2000),  L patellar fracture repair and tibial nailing after fall on driveway (2020), quadricepsplasty with another patellar fracture (2021, revision 2022). No pain but has limited flexion of the knee and there has been a question raised as to if this could be associated with a joint effusion. Her limited ROM has  been slowly progressive since her first knee surgery. Lymphedema is managed with compression/manual drainage/therapy, but makes it difficult to say if there is an actual effusion Has not noticed any changes in ROM when her LLE lymphedema fluctuations. No numbness/tingling, mechanical symptoms, or giving out.    Functional limitations: Difficulty getting in/out of car. Uses straight cane for balance in the setting of impaired sensation      PRIOR INJURIES/TREATMENT:   Ice/Heat: yes - post op  Brace: none since surgery  Physical Therapy: yes - post op     - Current Pain Medications -   None for this problem    - Prior/Trialed Pain Medications -   None for this problem    Prior Procedures:  Date      Procedure     Improvement (%)  none         Prior Related Surgery: Multiple surgeries on LLE and externsor mechanism, including L thigh liposarcoma resection/radiation (2000),  L patellar fracture repair and tibial nailing after fall on driveway (2020), quadricepsplasty with another patellar fracture (2021, revision 2022)     Other (acupuncture, OMT, CMM, TENS, DME, etc.): none    Specialists Seen - (with most recent, available notes and clinic visits reviewed)   1. Cancer rehab/Lymphedema (Dr Sharma) - 10/10/23   2. [facility]     IMAGING - reviewed   5/31/2022 LEFT KNEE PORTABLE ONE TO TWO VIEWS                                                                    IMPRESSION: Previously seen figure-of-eight tension banding across the  left patella has been intervally removed. Superior to inferior  directed lag screws across the patella with superior washers are  unchanged. Chronic healed left patella fracture deformity. Fractured  screw in the distal left femur is unchanged. Previous nailing of the  left tibia, similar to prior. No acute left knee fracture or  dislocation. Left knee joint spaces are unchanged. Chronic bone  fragment along the cephalad left patella is unchanged. Soft tissue gas  is seen, expected immediate  postoperative. Vertically oriented  anterior left knee skin staples midline. No left knee soft tissue  swelling.    Review Of Systems:  I am responding to those symptoms which are directly relevant to the specific indication for my consultation. I recommend that the patient follow up with their primary or referring provider to pursue any other symptoms which may be of concern.       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     She  has a past surgical history that includes APPENDECTOMY; DILATION/CURETTAGE DIAG/THER NON OB (02/1997); COLP CERVIX/UPPER VAGINA (07/1997); NONSPECIFIC PROCEDURE (04/2000); excision malig lesion>1.25cm (5/2000); COLONOSCOPY THRU STOMA, DIAGNOSTIC (2006); Colonoscopy (N/A, 2/5/2016); Explore groin (Right, 5/1/2018); vascular surgery (04/30/2018); IR Angiogram through Catheter Follow Up (12/20/2018); IR Lower Extremity Angiogram Left (12/19/2018); IR Angiogram through Catheter Follow Up (12/21/2018); Bypass graft femoropopliteal (Left, 1/21/2019); hip surgery (Left, 04/13/2019);  Cystoscopy; Arthroplasty hip (Left, 10/4/2019); Open reduction internal fixation rodding intramedullary tibia (Left, 12/21/2020); Open reduction internal fixation patella (Left, 12/21/2020); Repair tendon quadriceps (Left, 7/28/2021); Colonoscopy (N/A, 12/10/2021); biopsy (April 2000); Repair tendon quadriceps (Left, 5/31/2022); Remove hardware knee (Left, 5/31/2022); Biopsy Breast Seed Localization (Right, 12/14/2022); and Biopsy node sentinel (Right, 12/14/2022).    Family History  Her family history includes Alcohol/Drug in her father; Anxiety Disorder in her son; C.A.D. in her father, paternal grandmother, and paternal uncle; Cancer in her maternal aunt and maternal grandmother; Colon Cancer (age of onset: 70) in her brother; Coronary Artery Disease in her father; Diabetes in her maternal grandmother; Hyperlipidemia in her cousin, son, son, and son; Obesity in her mother; Osteoporosis in her mother.     Social History:  Work: retired  History of any legal related pain issues: none  Current living situation: lives with   She  reports that she has never smoked. She has never been exposed to tobacco smoke. She has never used smokeless tobacco. She reports that she does not drink alcohol and does not use drugs.        Current Medications:   She has a current medication list which includes the following prescription(s): calcium carbonate 600 mg-vitamin d 400 units, clopidogrel, denosumab, evolocumab, ezetimibe, letrozole, levothyroxine, magnesium oxide, multivitamin, therapeutic, nitrofurantoin macrocrystal-monohydrate, rivaroxaban anticoagulant, rosuvastatin, and solifenacin, and the following Facility-Administered Medications: ropivacaine, lidocaine, and triamcinolone.       Allergies:    -- Celexa [Citalopram Hydrobromide]     --  Decreased libido    PHYSICAL EXAMINATION:  /82 (BP Location: Right arm, Patient Position: Sitting, Cuff Size: Adult Large)   Pulse 69   Resp 16   LMP  (LMP Unknown)    SpO2 97%    General: Pleasant, straightforward, WDWN individual.  Mental Status: Pleasant, direct, appropriate mood and affect  Resp: breathing is unlabored without audible wheeze  Vascular: No visible cyanosis, no venous stasis changes  Heme: No visible ecchymosis or erythema on extremities  Skin: No notable rash    Musculoskeletal:  Well healed surgical scars on knee and thigh. ROM 0-90 deg. Overall non-tender over bony landmarks.      ASSESSMENT/PLAN:  Angeli Jacobson is a pleasant 73 year old female who presents with:    #.  LEFT thigh liposarcoma s/p resection/radiation therapy (2000), left patellar fracture repair and tibial nailing after fall (2020), quadricepsplasty with subsequent patellar fracture (2021, revision 2022)  #.  Left lower extremity lymphedema  #.  Age-related osteoporosis    On limited diagnostic ultrasound evaluation, there was only trace amount of fluid  extending into the suprapatellar joint capsule which is likely physiologic in nature.  She did not have significant joint effusion noted for aspiration.  She did have notable compressible fluid between the fascial planes of the thigh and quadriceps muscle which is consistent with her overall presentation of lymphedema.  As such, we did not inject or aspirate into the joint.      There is no injection or interventional procedure that I would have here that could expedite matters. Education was given in abundance. Empathy was expressed. There were no barriers to our conversation.      I will communicate this with her treating cancer rehabilitation physician, Dr. Sharma.     Ready to learn, no apparent learning barriers.  Education provided on treatment plan according to patient's preferred learning style.  Patient verbalizes understanding.   ________________________________       Again, thank you for allowing me to participate in the care of your patient.      Sincerely,    Randell Mckee MD

## 2024-02-27 ENCOUNTER — OFFICE VISIT (OUTPATIENT)
Dept: ENDOCRINOLOGY | Facility: CLINIC | Age: 74
End: 2024-02-27
Payer: COMMERCIAL

## 2024-02-27 VITALS
OXYGEN SATURATION: 96 % | WEIGHT: 162 LBS | DIASTOLIC BLOOD PRESSURE: 83 MMHG | SYSTOLIC BLOOD PRESSURE: 137 MMHG | BODY MASS INDEX: 26.55 KG/M2 | HEART RATE: 72 BPM

## 2024-02-27 DIAGNOSIS — E21.3 HYPERPARATHYROIDISM (H): Primary | ICD-10-CM

## 2024-02-27 PROCEDURE — 99214 OFFICE O/P EST MOD 30 MIN: CPT | Performed by: INTERNAL MEDICINE

## 2024-02-27 PROCEDURE — G2211 COMPLEX E/M VISIT ADD ON: HCPCS | Performed by: INTERNAL MEDICINE
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
## 2024-02-27 NOTE — PATIENT INSTRUCTIONS
## Osteoporosis on Fosamax 0347-1934, restarted Fosamax 2015-202, on Prolia 1/2023  ## Possible primary hyperparathyroidism  ## Thyroid nodules  Labs are not definite for primary hyperparathyroidism (normally we see high blood calcium, high urine calcium and not low parathyroid hormone)  There was 1 calcium that was high, urine calcium is normal and parathyroid hormone level was mildly elevated.  Ultrasound shows possible parathyroid nodules.  If you have overactive parathyroid, it may cause symptoms of high blood calcium, osteoporosis, kidney stone and kidney problems.  Base on your lab and DXA, only have osteoporosis.  No fracture/ no fall.  Lowest T-score -2.5 at at hip, unable to compare to prior scan  -- continue Prolia for now, needed to be every 6 and no longer than 7 months, for decreased risk of multiple spines fracture  -- continue calcium and vitamin D  -- follow up US 7/2024  -- parathyroid scan 7/2024  -- blood and urine test in 7/2024      Follow-up late 7/2024
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
## 2024-02-28 DIAGNOSIS — E78.5 HYPERLIPIDEMIA LDL GOAL <70: ICD-10-CM

## 2024-02-28 RX ORDER — EZETIMIBE 10 MG/1
10 TABLET ORAL DAILY
Qty: 90 TABLET | Refills: 1 | Status: SHIPPED | OUTPATIENT
Start: 2024-02-28 | End: 2024-05-03

## 2024-02-28 NOTE — TELEPHONE ENCOUNTER
ezetimibe (ZETIA) 10 MG tablet   Last Written Prescription Date:  4/4/23  Last Fill Quantity: 90,  # refills: 3    Last visit with provider:  10/25/23  Follow up recommended:  April 2024    Prescription approved per [facility] Refill Protocol.

## 2024-03-06 ENCOUNTER — TELEPHONE (OUTPATIENT)
Dept: OTHER | Facility: CLINIC | Age: 74
End: 2024-03-06
Payer: COMMERCIAL

## 2024-03-06 NOTE — TELEPHONE ENCOUNTER
Future Appointments   Date Time Provider Department [place]   5/2/2024 11:00 AM Tiesha Prince MD [facility]   5/15/2024 12:30 PM [id number]   [facility]   5/15/2024  1:40 PM Shade Owens MD [facility]

## 2024-03-06 NOTE — TELEPHONE ENCOUNTER
Pt LVM on direct line to schedule follow up with Dr. Owens in May 2024.    Routing to scheduling to contact patient to coordinate follow up.    LLE arterial US and ADRIAN with exercise in Epic.    Appt note in order comments.    Nicol Buenrostro, CHERISEN, RN, [facility] [place]

## 2024-03-12 ENCOUNTER — PATIENT OUTREACH (OUTPATIENT)
Dept: ONCOLOGY | Facility: CLINIC | Age: 74
End: 2024-03-12
Payer: COMMERCIAL

## 2024-03-12 ENCOUNTER — ONCOLOGY VISIT (OUTPATIENT)
Dept: ONCOLOGY | Facility: CLINIC | Age: 74
End: 2024-03-12
Attending: STUDENT IN AN ORGANIZED HEALTH CARE EDUCATION/TRAINING PROGRAM
Payer: COMMERCIAL

## 2024-03-12 VITALS
SYSTOLIC BLOOD PRESSURE: 132 MMHG | WEIGHT: 159.8 LBS | OXYGEN SATURATION: 96 % | DIASTOLIC BLOOD PRESSURE: 79 MMHG | BODY MASS INDEX: 26.19 KG/M2 | RESPIRATION RATE: 16 BRPM | HEART RATE: 73 BPM | TEMPERATURE: 97.6 F

## 2024-03-12 DIAGNOSIS — I89.0 LYMPHEDEMA OF LEFT LEG: ICD-10-CM

## 2024-03-12 DIAGNOSIS — Z85.831 HISTORY OF SARCOMA OF SOFT TISSUE: ICD-10-CM

## 2024-03-12 DIAGNOSIS — C50.411 MALIGNANT NEOPLASM OF UPPER-OUTER QUADRANT OF RIGHT BREAST IN FEMALE, ESTROGEN RECEPTOR POSITIVE (H): Primary | ICD-10-CM

## 2024-03-12 DIAGNOSIS — Z17.0 MALIGNANT NEOPLASM OF UPPER-OUTER QUADRANT OF RIGHT BREAST IN FEMALE, ESTROGEN RECEPTOR POSITIVE (H): Primary | ICD-10-CM

## 2024-03-12 PROCEDURE — 99215 OFFICE O/P EST HI 40 MIN: CPT | Mod: GC | Performed by: STUDENT IN AN ORGANIZED HEALTH CARE EDUCATION/TRAINING PROGRAM

## 2024-03-12 PROCEDURE — G0463 HOSPITAL OUTPT CLINIC VISIT: HCPCS | Performed by: STUDENT IN AN ORGANIZED HEALTH CARE EDUCATION/TRAINING PROGRAM

## 2024-03-12 ASSESSMENT — PAIN SCALES - GENERAL: PAINLEVEL: NO PAIN (0)

## 2024-03-12 NOTE — TELEPHONE ENCOUNTER
[facility]: Physical Medicine and Rehabilitation                                                                                   Spoke with spouse, Chris, today regarding MyChart messages with concerns regarding coverage for compression stockings.      Writer had spent approximately 45 minutes calling [facility] for information regarding coverage in addition to online searching at the recommendation of the .   Ultimately it was concluded coverage would be provided for compression stocks things CPT code a ,, as they would be considered medically necessary under Medicare guidelines.     said for the last several years they have had no issues obtaining the stockings however their insurance is changed with his [employer].  They were being proactive and calling their insurance, which is greatly appreciated.  They use Tyra with [facility] orth[facility] to obtain the compression stockings.    Patient has follow-up with Dr. Sharma today at 4 PM    Plan of Care Education Review:   Assessment completed with:: Patient    Plan of Care Education   Yearly learning assessment completed?: Yes (see Education tab)    Evaluation of Learning  Patient Education Provided: Yes  Readiness:: Acceptance  Method:: Explanation  Response:: Verbalizes understanding           Starr Dolan LPN  Oncology PM&R Care Coordination  [phone]

## 2024-03-12 NOTE — LETTER
3/12/2024         RE: Angeli Jacobson  [id number]  [facility] [id number]        Dear Colleague,    Thank you for referring your patient, Angeli Jacobson, to the [facility]. Please see a copy of my visit note below.    [facility]   PM&R clinic note        Interval history:     Angeli Jacobson presents to clinic today for follow up reg her rehab needs.   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   Was last seen in clinic on 10/10/2023, where multiple rehab concerns were addressed.     1.Therapy/equipment/braces:  1.Continue lymphedema therapy.  2.Continue daily compression and rest.  3.Continue manual lymphatic drainage, try to aim for once or twice daily.  Referral / follow up with other providers:   1. Referral to Dr. Mckee for further evaluation and management of left knee concerns.   2. Referral to PT for left knee ROM, strength, gait and balance in setting of left hip deficits.  Follow up: 4 to 6 months.    Oncologic history:  1.  11/7/2022: Screening mammogram showed asymmetry in the right breast at 12:00, retroareolar far posterior.  Left breast negative.  2.  11/15/2022: Diagnostic right mammogram showed asymmetry in the posterior right breast, 8 cm from nipple.  Ultrasound of right breast at 12:00, 2 cm from nipple showed a small benign cyst but no definite sonographic correlate for the mammographic asymmetry.  3.  11/16/2022: Stereotactic guided right breast needle biopsy of 8 mm lesion at 3:00, 8 cm from the nipple showed grade 2 invasive ductal carcinoma, ER positive at %, ND positive at  60-70%, HER2 IHC = 2+ equivocal, HER2/compa FISH negative.  4. 12/14/2022: Underwent right breast lumpectomy with right axillary sentinel lymph node excision with Dr. Shelly Carr.  Pathology showed no residual invasive malignancy; 2 microscopic foci of DCIS, grade 2, largest measuring 1.2 mm; margins negative; total 2 right axillary lymph nodes negative.  5. 2/10/23- Follow up visit with Dr. Angulo. Recommend total of 5 years of hormone blockade therapy. Agreed to try anastrozole. Continue lymphedema therapy and compression stockings for LLE lymphedema.  6. 6/19/23- Saw Dr. Angulo for follow up. Reported physical tiredness and joint stiffness since starting anastrozole.  noticed mental slowness. Tried protein powder which has helped a bit with energy level. Has 3 week cruise coming up at end of 7/2023. Due to increased mental/physical fatigue, suggested discontinue anastrozole and start letrozole after she returns from her trip. Agreed to start letrozole in 8/23. Due for next mammogramp in 11/23.      Today, Angeli feels well and denies acute concerns.  Her spouse, Chris, is present.  She has noticed that her compression garment for her left leg lymphedema has gotten a little loose recently.  She would like to be fitted for a new garment, in order to continue progressing with lymphedema control.  Angeli also notes that she has not seen her lymphedema therapist for roughly 1 year, and would like another referral for a couple of upkeep sessions.  She denies any new changes to her ADLs/IADLs.  Rossy has noticed her left foot occasionally catches on things when she is ambulating out in the community, but denies any new falls or tripping/stumbling while in the home.  She tries to be extra aware of her left foot and left leg in order to prevent falls.    Symptoms,    -Persistent left leg lymphedema, stable  -Denies headache, dizziness, muscle pain, numbness, tingling, skin  breakdown    Therapies/HEP,  Completed about 4 sessions of physical therapy, per recommendations from last visit.  Now, she completes her home exercise program roughly 3 times a week, and is trying to walk outside more with the nicer weather.    Functionally,   No changes since her last visit.      Social history is unchanged.      Medications:  Current Outpatient Medications   Medication Sig Dispense Refill     calcium carbonate 600 mg-vitamin D 400 units (CALTRATE) 600-400 MG-UNIT per tablet Take 1 chew tab by mouth daily       clopidogrel (PLAVIX) 75 MG tablet Take 1 tablet (75 mg) by mouth daily 90 tablet 3     denosumab (PROLIA) 60 MG/ML SOSY injection        evolocumab (REPATHA) 140 MG/ML prefilled autoinjector Inject 1 mL (140 mg) Subcutaneous every 14 days 6 mL 3     ezetimibe (ZETIA) 10 MG tablet TAKE 1 TABLET (10 MG) BY MOUTH DAILY. 90 tablet 1     letrozole (FEMARA) 2.5 MG tablet Take 1 tablet (2.5 mg) by mouth daily 90 tablet 3     levothyroxine (SYNTHROID/LEVOTHROID) 75 MCG tablet Take 1 tablet (75 mcg) by mouth daily 90 tablet 3     magnesium oxide 200 MG TABS        multivitamin, therapeutic (THERA-VIT) TABS tablet Take 1 tablet by mouth daily       nitroFURantoin macrocrystal-monohydrate (MACROBID) 100 MG capsule Take 1 capsule after intercourse 30 capsule 0     rivaroxaban ANTICOAGULANT (XARELTO) 2.5 MG TABS tablet Take 1 tablet (2.5 mg) by mouth 2 times daily 180 tablet 3     rosuvastatin (CRESTOR) 20 MG tablet TAKE 1 TABLET BY MOUTH EVERY DAY 90 tablet 1     solifenacin (VESICARE) 5 MG tablet Take 1 tablet (5 mg) by mouth daily 90 tablet 3              Physical Exam:   LMP  (LMP Unknown)   Gen: NAD, pleasant and cooperative   HEENT: MMM  Pulm: non-labored breathing in room air  Abd: benign, superficial and deep palpation does not reveal any abnormalities.  No fluid shift noted.  Ext: Mild lymphedema in LLE extending from thigh to foot, stable from previous visit.  Compression garment on left lower  extremity, nontender calves bilaterally, nontender knees to palpation  Neuro/MSK:   -Strength 5/5 in right hip flexors, right knee flexors, right knee extensors, bilateral ankle dorsiflexion and ankle plantarflexion  -Strength 3/5 in left hip flexors, 4/5 in left knee extensors and knee flexors  -Sensation intact to soft touch of bilateral lower extremities    Labs/Imaging:  Lab Results   Component Value Date    WBC 5.6 12/08/2022    HGB 13.5 12/08/2022    HCT 42.5 12/08/2022    MCV 94 12/08/2022     12/08/2022     Lab Results   Component Value Date     12/21/2023    POTASSIUM 4.3 12/21/2023    CHLORIDE 103 12/21/2023    CO2 28 12/21/2023    GLC 60 (L) 12/21/2023     Lab Results   Component Value Date    GFRESTIMATED 82 02/19/2024    GFRESTBLACK >90 05/17/2021     Lab Results   Component Value Date    AST 33 10/11/2023    ALT 19 10/11/2023    ALKPHOS 68 12/21/2023    BILITOTAL 0.4 10/11/2023    BILICONJ 0.0 06/06/2006     Lab Results   Component Value Date    INR 1.01 09/27/2019     Lab Results   Component Value Date    BUN 13.2 12/21/2023    CR 0.76 02/19/2024              Assessment/Plan   Angeli Jacobson presents to clinic today for follow up reg her rehab needs.   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     Mariluz appears well today and does not have new concerns or significant changes in her function.  Her left lower extremity lymphedema appears well-controlled at this time, and she was last fitted for compression garments at least 4 to 5 months ago.  Given this and the time spent in between her last lymphedema therapy appointment, will plan for new LLE compression garment  "and referral to lymphedema therapy    1.Therapy/equipment/braces:  New referral to lymphedema therapy for maintenance sessions  Referral for new LLE compression garment  2. Referral / follow up with other providers:   1.  Follow-up as planned with PCP and oncology team  Follow up: 1 year or as needed    Patient seen and discussed with PM&R staff attending, Dr. Zachary Thibodeaux,   PGY-3  Physical Medicine and Rehabilitation    Physician Attestation  I, Leeann hSarma MD, saw this patient and agree with the findings and plan of care as documented in the note.      Items personally reviewed/procedural attestation: vitals, labs, and imaging and agree with the interpretation documented in the note.    Leeann Sharma MD  Physical Medicine & Rehabilitation      50 minutes spent on the date of the encounter doing chart review, history and exam, documentation and further activities as noted above.             Oncology Rooming Note    March 12, 2024 3:58 PM   Angeli Jacobosn is a 73 year old female who presents for:    Chief Complaint   Patient presents with     Oncology Clinic Visit     Initial Vitals: /79   Pulse 73   Temp 97.6  F (36.4  C) (Oral)   Resp 16   Wt 72.5 kg (159 lb 12.8 oz)   LMP  (LMP Unknown)   SpO2 96%   BMI 26.19 kg/m   Estimated body mass index is 26.19 kg/m  as calculated from the following:    Height as of 2/16/24: 1.664 m (5' 5.5\").    Weight as of this encounter: 72.5 kg (159 lb 12.8 oz). Body surface area is 1.83 meters squared.  No Pain (0) Comment: Data Unavailable   No LMP recorded (lmp unknown). Patient is postmenopausal.  Allergies reviewed: Yes  Medications reviewed: Yes    Medications: Medication refills not needed today.  Pharmacy name entered into [employer]:    [id number] [place], [place] - [address] [id number] - [place], [place] - [address]    Frailty Screening:   Is the patient here for a new oncology consult visit in cancer care? 2. " No      Clinical concerns: no         Shari J. Schoenberger, EMELY                Again, thank you for allowing me to participate in the care of your patient.        Sincerely,        Leeann Sharma MD

## 2024-03-12 NOTE — PROGRESS NOTES
[facility]   PM&R clinic note        Interval history:     Angeli Jacobson presents to clinic today for follow up reg her rehab needs.   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   Was last seen in clinic on 10/10/2023, where multiple rehab concerns were addressed.     1.Therapy/equipment/braces:  1.Continue lymphedema therapy.  2.Continue daily compression and rest.  3.Continue manual lymphatic drainage, try to aim for once or twice daily.  Referral / follow up with other providers:   1. Referral to Dr. Mckee for further evaluation and management of left knee concerns.   2. Referral to PT for left knee ROM, strength, gait and balance in setting of left hip deficits.  Follow up: 4 to 6 months.    Oncologic history:  1.  11/7/2022: Screening mammogram showed asymmetry in the right breast at 12:00, retroareolar far posterior.  Left breast negative.  2.  11/15/2022: Diagnostic right mammogram showed asymmetry in the posterior right breast, 8 cm from nipple.  Ultrasound of right breast at 12:00, 2 cm from nipple showed a small benign cyst but no definite sonographic correlate for the mammographic asymmetry.  3.  11/16/2022: Stereotactic guided right breast needle biopsy of 8 mm lesion at 3:00, 8 cm from the nipple showed grade 2 invasive ductal carcinoma, ER positive at %, TN positive at 60-70%, HER2 IHC = 2+ equivocal, HER2/compa FISH negative.  4. 12/14/2022: Underwent right breast lumpectomy with right axillary sentinel lymph node excision with Dr. Shelly Carr.  Pathology showed no residual invasive malignancy; 2 microscopic foci of DCIS, grade 2,  largest measuring 1.2 mm; margins negative; total 2 right axillary lymph nodes negative.  5. 2/10/23- Follow up visit with Dr. Angulo. Recommend total of 5 years of hormone blockade therapy. Agreed to try anastrozole. Continue lymphedema therapy and compression stockings for LLE lymphedema.  6. 6/19/23- Saw Dr. Angulo for follow up. Reported physical tiredness and joint stiffness since starting anastrozole.  noticed mental slowness. Tried protein powder which has helped a bit with energy level. Has 3 week cruise coming up at end of 7/2023. Due to increased mental/physical fatigue, suggested discontinue anastrozole and start letrozole after she returns from her trip. Agreed to start letrozole in 8/23. Due for next mammogramp in 11/23.      Today, Angeli feels well and denies acute concerns.  Her spouse, Chris, is present.  She has noticed that her compression garment for her left leg lymphedema has gotten a little loose recently.  She would like to be fitted for a new garment, in order to continue progressing with lymphedema control.  Angeli also notes that she has not seen her lymphedema therapist for roughly 1 year, and would like another referral for a couple of upkeep sessions.  She denies any new changes to her ADLs/IADLs.  Rossy has noticed her left foot occasionally catches on things when she is ambulating out in the community, but denies any new falls or tripping/stumbling while in the home.  She tries to be extra aware of her left foot and left leg in order to prevent falls.    Symptoms,    -Persistent left leg lymphedema, stable  -Denies headache, dizziness, muscle pain, numbness, tingling, skin breakdown    Therapies/HEP,  Completed about 4 sessions of physical therapy, per recommendations from last visit.  Now, she completes her home exercise program roughly 3 times a week, and is trying to walk outside more with the nicer weather.    Functionally,   No changes since her last  visit.      Social history is unchanged.      Medications:  Current Outpatient Medications   Medication Sig Dispense Refill    calcium carbonate 600 mg-vitamin D 400 units (CALTRATE) 600-400 MG-UNIT per tablet Take 1 chew tab by mouth daily      clopidogrel (PLAVIX) 75 MG tablet Take 1 tablet (75 mg) by mouth daily 90 tablet 3    denosumab (PROLIA) 60 MG/ML SOSY injection       evolocumab (REPATHA) 140 MG/ML prefilled autoinjector Inject 1 mL (140 mg) Subcutaneous every 14 days 6 mL 3    ezetimibe (ZETIA) 10 MG tablet TAKE 1 TABLET (10 MG) BY MOUTH DAILY. 90 tablet 1    letrozole (FEMARA) 2.5 MG tablet Take 1 tablet (2.5 mg) by mouth daily 90 tablet 3    levothyroxine (SYNTHROID/LEVOTHROID) 75 MCG tablet Take 1 tablet (75 mcg) by mouth daily 90 tablet 3    magnesium oxide 200 MG TABS       multivitamin, therapeutic (THERA-VIT) TABS tablet Take 1 tablet by mouth daily      nitroFURantoin macrocrystal-monohydrate (MACROBID) 100 MG capsule Take 1 capsule after intercourse 30 capsule 0    rivaroxaban ANTICOAGULANT (XARELTO) 2.5 MG TABS tablet Take 1 tablet (2.5 mg) by mouth 2 times daily 180 tablet 3    rosuvastatin (CRESTOR) 20 MG tablet TAKE 1 TABLET BY MOUTH EVERY DAY 90 tablet 1    solifenacin (VESICARE) 5 MG tablet Take 1 tablet (5 mg) by mouth daily 90 tablet 3              Physical Exam:   LMP  (LMP Unknown)   Gen: NAD, pleasant and cooperative   HEENT: MMM  Pulm: non-labored breathing in room air  Abd: benign, superficial and deep palpation does not reveal any abnormalities.  No fluid shift noted.  Ext: Mild lymphedema in LLE extending from thigh to foot, stable from previous visit.  Compression garment on left lower extremity, nontender calves bilaterally, nontender knees to palpation  Neuro/MSK:   -Strength 5/5 in right hip flexors, right knee flexors, right knee extensors, bilateral ankle dorsiflexion and ankle plantarflexion  -Strength 3/5 in left hip flexors, 4/5 in left knee extensors and knee  flexors  -Sensation intact to soft touch of bilateral lower extremities    Labs/Imaging:  Lab Results   Component Value Date    WBC 5.6 12/08/2022    HGB 13.5 12/08/2022    HCT 42.5 12/08/2022    MCV 94 12/08/2022     12/08/2022     Lab Results   Component Value Date     12/21/2023    POTASSIUM 4.3 12/21/2023    CHLORIDE 103 12/21/2023    CO2 28 12/21/2023    GLC 60 (L) 12/21/2023     Lab Results   Component Value Date    GFRESTIMATED 82 02/19/2024    GFRESTBLACK >90 05/17/2021     Lab Results   Component Value Date    AST 33 10/11/2023    ALT 19 10/11/2023    ALKPHOS 68 12/21/2023    BILITOTAL 0.4 10/11/2023    BILICONJ 0.0 06/06/2006     Lab Results   Component Value Date    INR 1.01 09/27/2019     Lab Results   Component Value Date    BUN 13.2 12/21/2023    CR 0.76 02/19/2024              Assessment/Plan   Angeli Jacobson presents to clinic today for follow up reg her rehab needs.   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     Mariluz appears well today and does not have new concerns or significant changes in her function.  Her left lower extremity lymphedema appears well-controlled at this time, and she was last fitted for compression garments at least 4 to 5 months ago.  Given this and the time spent in between her last lymphedema therapy appointment, will plan for new LLE compression garment and referral to lymphedema therapy    1.Therapy/equipment/braces:  New referral to lymphedema therapy for maintenance sessions  Referral for new LLE compression garment  2. Referral / follow up with other providers:   1.  Follow-up as planned with PCP and oncology team  Follow up: 1 year  or as needed    Patient seen and discussed with PM&R staff attending, Dr. Zachary Thibodeaux, DO  PGY-3  Physical Medicine and Rehabilitation    Physician Attestation   I, Leeann Sharma MD, saw this patient and agree with the findings and plan of care as documented in the note.      Items personally reviewed/procedural attestation: vitals, labs, and imaging and agree with the interpretation documented in the note.    Leeann Sharma MD  Physical Medicine & Rehabilitation      50 minutes spent on the date of the encounter doing chart review, history and exam, documentation and further activities as noted above.

## 2024-03-12 NOTE — PROGRESS NOTES
"Oncology Rooming Note    March 12, 2024 3:58 PM   Angeli Jacobson is a 73 year old female who presents for:    Chief Complaint   Patient presents with    Oncology Clinic Visit     Initial Vitals: /79   Pulse 73   Temp 97.6  F (36.4  C) (Oral)   Resp 16   Wt 72.5 kg (159 lb 12.8 oz)   LMP  (LMP Unknown)   SpO2 96%   BMI 26.19 kg/m   Estimated body mass index is 26.19 kg/m  as calculated from the following:    Height as of 2/16/24: 1.664 m (5' 5.5\").    Weight as of this encounter: 72.5 kg (159 lb 12.8 oz). Body surface area is 1.83 meters squared.  No Pain (0) Comment: Data Unavailable   No LMP recorded (lmp unknown). Patient is postmenopausal.  Allergies reviewed: Yes  Medications reviewed: Yes    Medications: Medication refills not needed today.  Pharmacy name entered into [employer]:    [id number] [place], [place] - [address] [id number] [place] [address] - [address]    Frailty Screening:   Is the patient here for a new oncology consult visit in cancer care? 2. No      Clinical concerns: no         Shari J. Schoenberger, CMA              "

## 2024-03-14 NOTE — PATIENT INSTRUCTIONS
It was nice to see you again today.    1.  A referral to lymphedema therapy was placed to help with your leg swelling.  2.  A referral for new compression garments for your left leg was placed today.  3.  Follow-up with Dr. Sharma in 1 year.

## 2024-03-18 ENCOUNTER — THERAPY VISIT (OUTPATIENT)
Dept: PHYSICAL THERAPY | Facility: CLINIC | Age: 74
End: 2024-03-18
Attending: STUDENT IN AN ORGANIZED HEALTH CARE EDUCATION/TRAINING PROGRAM
Payer: COMMERCIAL

## 2024-03-18 DIAGNOSIS — C50.411 MALIGNANT NEOPLASM OF UPPER-OUTER QUADRANT OF RIGHT BREAST IN FEMALE, ESTROGEN RECEPTOR POSITIVE (H): ICD-10-CM

## 2024-03-18 DIAGNOSIS — Z17.0 MALIGNANT NEOPLASM OF UPPER-OUTER QUADRANT OF RIGHT BREAST IN FEMALE, ESTROGEN RECEPTOR POSITIVE (H): ICD-10-CM

## 2024-03-18 DIAGNOSIS — M25.60 DECREASED RANGE OF MOTION: ICD-10-CM

## 2024-03-18 DIAGNOSIS — R53.1 DECREASED STRENGTH: ICD-10-CM

## 2024-03-18 DIAGNOSIS — Z85.831 HISTORY OF SARCOMA OF SOFT TISSUE: ICD-10-CM

## 2024-03-18 DIAGNOSIS — L90.5 SCAR CONDITION AND FIBROSIS OF SKIN: ICD-10-CM

## 2024-03-18 DIAGNOSIS — I89.0 LYMPHEDEMA OF LEFT LEG: Primary | ICD-10-CM

## 2024-03-18 PROCEDURE — 97140 MANUAL THERAPY 1/> REGIONS: CPT | Mod: GP | Performed by: PHYSICAL THERAPIST

## 2024-03-18 PROCEDURE — 97110 THERAPEUTIC EXERCISES: CPT | Mod: GP | Performed by: PHYSICAL THERAPIST

## 2024-03-18 PROCEDURE — 97161 PT EVAL LOW COMPLEX 20 MIN: CPT | Mod: GP | Performed by: PHYSICAL THERAPIST

## 2024-03-18 NOTE — PROGRESS NOTES
PHYSICAL THERAPY EVALUATION  Type of Visit: Evaluation    See electronic medical record for Abuse and Falls Screening details.    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   Dates & types of surgery: 2018 - 2023 7 surgeries in left thigh - Vascular, left hip, left knee.    Right Breast surgery to remove small nodule, followed by 3 week course of radiation (Dec '22 -  March '23    Prior diagnostic imaging/testing results:       Prior therapy history for the same diagnosis, illness or injury: Yes Lymphedema therapy from [facility], 2023  S/p ortho PT 11/2023    Prior Level of Function  Transfers: Independent  Ambulation: Independent  ADL: Independent  IADL: Independent  EXERCISE: performing stretching strengthening daily; trying to get up and walk every hour during day with 1 extended extened walk ie x 15';     Living Environment  Social support: With a significant other or spouse  Chris  Type of home: House; 2-story   Stairs to enter the home: No       Ramp: No   Stairs inside the home: Yes 1   ; step to pattern  Help at home: None  Equipment owned: Four-point cane; Grab bars uses cane in more crowded events or with longer walks    Employment: No    Hobbies/Interests: word puzzles; cooking-baking;  grandchildren; [employer]    Patient goals for therapy: Activities remain stable : )    Pain assessment: Pain denied but reports stiffness of L knee     Objective       EDEMA EVALUATION  Additional history:  Body part affected by edema:  L LE  If cancer related, treatment:  s/p sarcoma  If not cancer related, problems with veins or cause of swelling:    Distance able to walk:  using cane x 5-6 blocks 30'   Sensation problems in hands/feet:   decreased L foot   Edema etiology: Cancer with lymph node dissection, Chemo, Radiation, Surgery, GISSELL    FUNCTIONAL SCALES  LLIS = 29  Cognitive Status Examination  Orientation: Oriented to person, place and time   Level of Consciousness: Alert  Follows Commands and Answers Questions: 100% of the time  Personal Safety and Judgement: Intact    EDEMA  Skin Condition: Intact, Lipodermatosclerosis, Non-pitting, Pitting, mild errthyma of L anterior mid shin ; significant rubbery fibrosis throughout L LE especially L calf and knee; 1+ pretibial pitting L LE; nonpitting puffy edema L lower quadrant  Scar: Yes; anteiror L knee extending proxmially as well as medial L knee and thing and signficant adhenace of L proxiaml medial thigh  Capillary Refill: Symmetrical  Stemmer Sign: +; L only  Ulceration: No    GIRTH MEASUREMENTS: Refer to separate girth measurement flowsheet.       VOLUME LE  Right LE (mL)    Left LE (mL)    LE Volume Comparison LLE volume greater than RLE volume   % Difference Full leg = 18%; lower leg = 38%   Head/Neck Volume     Trunk Volume    Genital Volume       RANGE OF MOTION:  L knee 0-90  STRENGTH:  decreased L hip; and L knee flexion  BED MOBILITY:  ind  TRANSFERS: Independent  GAIT/LOCOMOTION: antalgic due to decreased   BALANCE:  decreased due to leg length discrepancy  SENSATION:  decreased dorsal L foot and laterally and toes  VASCULAR:  stable L thigh artial stent    Assessment & Plan   CLINICAL IMPRESSIONS  Medical Diagnosis: Lymphedema L LE; h/o sarcoma of  soft tissue    Treatment Diagnosis: lymphedema, fibrosis, decreased endurance, antalgic gait, decreased strength, decreased balance   Impression/Assessment: Patient is a 73 year old female with concerns for worsening L LE edema complaints.  The following significant findings have been identified: Decreased ROM/flexibility, Decreased strength, Impaired balance, Impaired sensation, Edema, Impaired gait, Impaired muscle performance, and Decreased activity tolerance. These impairments interfere with their ability to perform self care tasks, recreational activities, and community mobility as compared to previous level of function.     Clinical Decision Making (Complexity):  Clinical Presentation: Stable/Uncomplicated  Clinical Presentation Rationale: based on medical and personal factors listed in PT evaluation  Clinical Decision Making (Complexity): Low complexity    PLAN OF CARE  Treatment Interventions:  Interventions: Manual Therapy, Neuromuscular Re-education, Therapeutic Exercise    Long Term Goals     PT Goal 1  Goal Identifier: Home program  Goal Description: Patient will be independent in home program to manage conditions of lymphedema and fibrosis.  Rationale: to maximize safety and independence with performance of ADLs and functional tasks;to maximize safety and independence within the home;to maximize safety and independence within the community;to maximize safety and independence with self cares  Goal Progress: MET  Target Date: 03/18/24  Date Met: 03/18/24      Frequency of Treatment: 1x  Duration of Treatment: 1    Recommended Referrals to Other Professionals:  schedule with DME fitter for new thigh high custom stocking  Education Assessment:   Learner/Method: Patient  Education Comments: updates to HEP    Risks and benefits of evaluation/treatment have been explained.   Patient/Family/caregiver agrees with Plan of Care.     Evaluation Time:     PT Eval, Low Complexity Minutes (56320): 25       Signing  Clinician: Mariana Guzman, PT      [facility]                                                                                   OUTPATIENT PHYSICAL THERAPY      PLAN OF TREATMENT FOR OUTPATIENT REHABILITATION   Patient's Last Name, First Name, Angeli Pond YOB: 1950   Provider's Name   [facility]   Medical Record No.  [id number]     Onset Date: 03/12/24 (date or order; Leeann Sharma MD)  Start of Care Date: 03/18/24     Medical Diagnosis:  Lymphedema L LE; h/o sarcoma of soft tissue      PT Treatment Diagnosis:  lymphedema, fibrosis, decreased endurance, antalgic gait, decreased strength, decreased balance Plan of Treatment  Frequency/Duration: 1x/ 1    Certification date from 03/18/24 to 03/18/24         See note for plan of treatment details and functional goals     Mariana Guzman, PT                         I CERTIFY THE NEED FOR THESE SERVICES FURNISHED UNDER        THIS PLAN OF TREATMENT AND WHILE UNDER MY CARE     (Physician attestation of this document indicates review and certification of the therapy plan).              Referring Provider:  Leeann Sharma    Initial Assessment  See Epic Evaluation- Start of Care Date: 03/18/24

## 2024-03-20 ENCOUNTER — IMMUNIZATION (OUTPATIENT)
Dept: FAMILY MEDICINE | Facility: CLINIC | Age: 74
End: 2024-03-20
Payer: COMMERCIAL

## 2024-03-20 DIAGNOSIS — Z23 HIGH PRIORITY FOR 2019-NCOV VACCINE: ICD-10-CM

## 2024-03-20 DIAGNOSIS — Z23 ENCOUNTER FOR IMMUNIZATION: Primary | ICD-10-CM

## 2024-03-20 PROCEDURE — 90480 ADMN SARSCOV2 VAC 1/ONLY CMP: CPT

## 2024-03-20 PROCEDURE — 91320 SARSCV2 VAC 30MCG TRS-SUC IM: CPT

## 2024-03-21 ENCOUNTER — MYC REFILL (OUTPATIENT)
Dept: SURGERY | Facility: CLINIC | Age: 74
End: 2024-03-21
Payer: COMMERCIAL

## 2024-03-21 DIAGNOSIS — Z95.828 HISTORY OF ARTERIAL BYPASS OF LOWER EXTREMITY: ICD-10-CM

## 2024-03-21 RX ORDER — CLOPIDOGREL BISULFATE 75 MG/1
75 TABLET ORAL DAILY
Qty: 90 TABLET | Refills: 3 | Status: CANCELLED | OUTPATIENT
Start: 2024-03-21

## 2024-03-22 DIAGNOSIS — Z95.828 HISTORY OF ARTERIAL BYPASS OF LOWER EXTREMITY: ICD-10-CM

## 2024-03-22 RX ORDER — CLOPIDOGREL BISULFATE 75 MG/1
75 TABLET ORAL DAILY
Qty: 90 TABLET | Refills: 3 | Status: SHIPPED | OUTPATIENT
Start: 2024-03-22 | End: 2024-05-03

## 2024-03-22 NOTE — TELEPHONE ENCOUNTER
clopidogrel (PLAVIX) 75 MG tablet   Last Written Prescription Date:  4/4/23  Last Fill Quantity: 90,  # refills: 3     Last office visit: 4/4/2023;     Future office visit:  Future Appointments   Date Time Provider Department Center   5/2/2024 11:00 AM Tiesha Prince MD [facility]     [place] [facility] [place]  Medication and pharmacy loaded.

## 2024-03-23 ENCOUNTER — HEALTH MAINTENANCE LETTER (OUTPATIENT)
Age: 74
End: 2024-03-23

## 2024-03-28 ENCOUNTER — MYC REFILL (OUTPATIENT)
Dept: OTHER | Facility: CLINIC | Age: 74
End: 2024-03-28
Payer: COMMERCIAL

## 2024-03-28 DIAGNOSIS — E78.5 HYPERLIPIDEMIA LDL GOAL <70: ICD-10-CM

## 2024-03-28 RX ORDER — ROSUVASTATIN CALCIUM 20 MG/1
20 TABLET, COATED ORAL DAILY
Qty: 90 TABLET | Refills: 1 | Status: SHIPPED | OUTPATIENT
Start: 2024-03-28 | End: 2024-05-03

## 2024-05-01 ASSESSMENT — SLEEP AND FATIGUE QUESTIONNAIRES
HOW LIKELY ARE YOU TO NOD OFF OR FALL ASLEEP WHEN YOU ARE A PASSENGER IN A CAR FOR AN HOUR WITHOUT A BREAK: HIGH CHANCE OF DOZING
HOW LIKELY ARE YOU TO NOD OFF OR FALL ASLEEP IN A CAR, WHILE STOPPED FOR A FEW MINUTES IN TRAFFIC: WOULD NEVER DOZE
HOW LIKELY ARE YOU TO NOD OFF OR FALL ASLEEP WHILE SITTING INACTIVE IN A PUBLIC PLACE: MODERATE CHANCE OF DOZING
HOW LIKELY ARE YOU TO NOD OFF OR FALL ASLEEP WHILE SITTING QUIETLY AFTER LUNCH WITHOUT ALCOHOL: MODERATE CHANCE OF DOZING
HOW LIKELY ARE YOU TO NOD OFF OR FALL ASLEEP WHILE WATCHING TV: MODERATE CHANCE OF DOZING
HOW LIKELY ARE YOU TO NOD OFF OR FALL ASLEEP WHILE LYING DOWN TO REST IN THE AFTERNOON WHEN CIRCUMSTANCES PERMIT: HIGH CHANCE OF DOZING
HOW LIKELY ARE YOU TO NOD OFF OR FALL ASLEEP WHILE SITTING AND TALKING TO SOMEONE: SLIGHT CHANCE OF DOZING
HOW LIKELY ARE YOU TO NOD OFF OR FALL ASLEEP WHILE SITTING AND READING: HIGH CHANCE OF DOZING

## 2024-05-02 ENCOUNTER — OFFICE VISIT (OUTPATIENT)
Dept: SLEEP MEDICINE | Facility: CLINIC | Age: 74
End: 2024-05-02
Payer: COMMERCIAL

## 2024-05-02 DIAGNOSIS — R06.81 WITNESSED APNEIC SPELLS: ICD-10-CM

## 2024-05-02 DIAGNOSIS — R06.83 SNORING: ICD-10-CM

## 2024-05-02 DIAGNOSIS — G47.19 EXCESSIVE DAYTIME SLEEPINESS: ICD-10-CM

## 2024-05-02 PROCEDURE — 95800 SLP STDY UNATTENDED: CPT | Performed by: INTERNAL MEDICINE

## 2024-05-02 NOTE — PROGRESS NOTES
Pt is completing a home sleep test. Pt was instructed on how to put on the Noxturnal T3 device and associated equipment before going to bed and given the opportunity to practice putting it on before leaving the sleep center. Pt was reminded to bring the home sleep test kit back to the center tomorrow, at agreed upon time for download and reporting.   Neck circumference: 36 CM / 14 inches.   Jennifer Wagoner MA

## 2024-05-03 ENCOUNTER — OFFICE VISIT (OUTPATIENT)
Dept: OTHER | Facility: CLINIC | Age: 74
End: 2024-05-03
Attending: INTERNAL MEDICINE
Payer: COMMERCIAL

## 2024-05-03 ENCOUNTER — DOCUMENTATION ONLY (OUTPATIENT)
Dept: SLEEP MEDICINE | Facility: CLINIC | Age: 74
End: 2024-05-03
Payer: COMMERCIAL

## 2024-05-03 VITALS
DIASTOLIC BLOOD PRESSURE: 82 MMHG | HEIGHT: 65 IN | OXYGEN SATURATION: 97 % | SYSTOLIC BLOOD PRESSURE: 127 MMHG | HEART RATE: 69 BPM | BODY MASS INDEX: 27.09 KG/M2 | WEIGHT: 162.6 LBS

## 2024-05-03 DIAGNOSIS — Z98.890 HISTORY OF FEMOROPOPLITEAL BYPASS: ICD-10-CM

## 2024-05-03 DIAGNOSIS — E03.9 HYPOTHYROIDISM, UNSPECIFIED TYPE: ICD-10-CM

## 2024-05-03 DIAGNOSIS — I89.0 LYMPHEDEMA OF LEFT LEG: ICD-10-CM

## 2024-05-03 DIAGNOSIS — E78.5 HYPERLIPIDEMIA LDL GOAL <70: ICD-10-CM

## 2024-05-03 DIAGNOSIS — I73.9 PAD (PERIPHERAL ARTERY DISEASE) (H): Primary | ICD-10-CM

## 2024-05-03 PROCEDURE — G0463 HOSPITAL OUTPT CLINIC VISIT: HCPCS | Performed by: INTERNAL MEDICINE

## 2024-05-03 PROCEDURE — 99215 OFFICE O/P EST HI 40 MIN: CPT | Performed by: INTERNAL MEDICINE

## 2024-05-03 PROCEDURE — G2211 COMPLEX E/M VISIT ADD ON: HCPCS | Performed by: INTERNAL MEDICINE

## 2024-05-03 RX ORDER — EZETIMIBE 10 MG/1
10 TABLET ORAL DAILY
Qty: 90 TABLET | Refills: 3 | Status: SHIPPED | OUTPATIENT
Start: 2024-05-03

## 2024-05-03 RX ORDER — ROSUVASTATIN CALCIUM 20 MG/1
20 TABLET, COATED ORAL DAILY
Qty: 90 TABLET | Refills: 3 | Status: SHIPPED | OUTPATIENT
Start: 2024-05-03

## 2024-05-03 RX ORDER — LEVOTHYROXINE SODIUM 75 UG/1
75 TABLET ORAL DAILY
Qty: 90 TABLET | Refills: 3 | Status: SHIPPED | OUTPATIENT
Start: 2024-05-03

## 2024-05-03 RX ORDER — CLOPIDOGREL BISULFATE 75 MG/1
75 TABLET ORAL DAILY
Qty: 90 TABLET | Refills: 3 | Status: SHIPPED | OUTPATIENT
Start: 2024-05-03

## 2024-05-03 NOTE — PROCEDURES
"HOME SLEEP STUDY INTERPRETATION        Patient: Angeli Jacobson  MRN: [id number]  YOB: 1950  Study Date: 5/2/2024  PCP/Referring Provider: Edel Mccarty;   Ordering Provider: Charley Martinez PA-C         Indications for Home Study: Angeli Jacobson is a 74 year old female who presents with symptoms suggestive of obstructive sleep apnea.    Estimated body mass index is 27.48 kg/m  as calculated from the following:    Height as of 5/3/24: 1.638 m (5' 4.5\").    Weight as of 5/3/24: 73.8 kg (162 lb 9.6 oz).  Total score - Douglas: 16 (5/1/2024  3:16 PM)        Data: A full night home sleep study was performed recording the standard physiologic parameters including body position, movement, sound, nasal pressure, thermal oral airflow, chest and abdominal movements with respiratory inductance plethysmography, and oxygen saturation by pulse oximetry. Pulse rate was estimated by oximetry recording. This study was considered adequate based on > 4 hours of quality oximetry and respiratory recording. As specified by the AASM Manual for the Scoring of Sleep and Associated events, version 2.3, Rule VIII.D 1B, 4% oxygen desaturation scoring for hypopneas is used as a standard of care on all home sleep apnea testing.        Analysis Time:  567.7 minutes        Respiration:   Sleep Associated Hypoxemia: sustained hypoxemia was present. Baseline oxygen saturation was 91%.  Time with saturation less than or equal to 88% was 9.4 minutes. The lowest oxygen saturation was 74%.   Snoring: Snoring was present.  Respiratory events: The home study revealed a presence of 14 obstructive apneas and 9 mixed and central apneas. There were 61 hypopneas resulting in a combined apnea/hypopnea index [AHI] of 8.9 events per hour.  AHI was 25.7 per hour supine, N/A per hour prone, 4.8 per hour on left side, and 5.2 per hour on right side.   Pattern: Excluding events noted above, respiratory rate and pattern was " Normal.      Position: Percent of time spent: supine - 19.4%, prone - 0%, on left - 72.5%, on right - 8.2%.      Heart Rate: By pulse oximetry normal rate was noted.       Assessment:   Mild obstructive sleep apnea.  Sleep associated hypoxemia was present.  Sleep apnea events were predominantly supine position related.    Recommendations:  Depending on clinical indications for treatment of mild sleep apnea, following therapy options can be considered.  Patient can consider oral appliance therapy through her primary sleep medicine.  If there is excessive daytime sleepiness or other qualifying medical comorbidity, consider auto titrating CPAP therapy for treatment.  Suggest optimizing sleep hygiene and avoiding sleep deprivation.        Diagnosis Code(s): Obstructive Sleep Apnea G47.33, Hypoxemia G47.36    Electronically signed by: Donaldo Sharma MD, May 3, 2024   Diplomate, American Board of Psychiatry and Neurology, Sleep Medicine

## 2024-05-03 NOTE — PROGRESS NOTES
HST POST-STUDY QUESTIONNAIRE    What time did you go to bed?  950PM  How long do you think it took to fall asleep?  20MINS  What time did you wake up to start the day?  740AM  Did you get up during the night at all?  NO, BUT I WAS AWAKE, I DID NOT CHECK THE TIME.  If you woke up, do you remember approximately what time(s)?   Did you have any difficulty with the equipment?  No  Did you us any type of treatment with this study?  None  Was the head of the bed elevated? No  Did you sleep in a recliner?  No  Did you stop using CPAP at least 3 days before this test?  NA  Any other information you'd like us to know?

## 2024-05-03 NOTE — PROGRESS NOTES
SUBJECTIVE:  Follow-up visit  Multiple issues   Review of recent labs and previous imaging studies   Med refills   Tolerating crestor, Zetia and PCSK 9 inhibitor  Lipids well-controlled on December 2023 lab tests LDL is 68  She is able to walk half a mile without any problems  History of left lower extremity arterial bypass for PAD  She broke her left hip in April 2019 while in [place] underwent ORIF followed by left total hip replacement on October 4, 2019 at [facility]   History of breast cancer underwent Sx and XRT seeing Oncologist   Scheduled to undergo arterial duplex and ADRIAN this month then visit with Dr. Owens  She accompanied by her  today for this visit        History of present illness:   Angeli Jacobson is a 74 year old very pleasant female with past medical history of left thigh/groin sarcoma underwent surgery with lymphadenectomy and radiation therapy in the year 2000 at [facility] followed by she developed peripheral arterial disease underwent  Redo left common femoral to a bony popliteal artery bypass with 6 mm PTFE graft in January 2019 and since then reasonably doing well as for as the PAD concerned visited [place] in April and she fell down broke her left hip underwent ORIF over there.   She has a long-standing history of lymphedema after sarcoma surgery and radiation therapy.  Using compression stockings etc..  She was also evaluated extensively at lymphedema clinic and currently using pump  On October 4, 2019 she underwent left total hip replacement .  She has been taking both Plavix and low-dose Xarelto 2.5 mg twice a day           HISTORIES:  PROBLEM LIST:   Patient Active Problem List   Diagnosis    MULTINODUL GOITER    Hearing loss    Hyperlipidemia LDL goal <70    Osteoporosis    Impaired fasting glucose    Lymphedema of left leg    Tubular adenoma    Vertigo    Myxoid liposarcoma (HCC)    PAD (peripheral artery disease) (H24)    Vascular  graft occlusion (H24)    Femoral-popliteal bypass graft occlusion, left (H24)    History of sarcoma    S/P ORIF (open reduction internal fixation) fracture    Hip pain, left    Postural urinary incontinence    Personal history of urinary tract infection    OAB (overactive bladder)    Myalgia of pelvic floor    Lesion of bladder    S/P total hip arthroplasty    Acute pain of left knee    Closed fracture of left tibia and fibula, initial encounter    Closed displaced fracture of left patella, unspecified fracture morphology, initial encounter    Other specified injury of left quadriceps muscle, fascia and tendon, initial encounter    Elevated coronary artery calcium score=7/1/21     Arthrofibrosis of knee joint, left    Hypothyroidism, unspecified type    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 , 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 - s/p lumpectomy + radiation    Left knee pain    Long term current use of aromatase inhibitor     PAST MEDICAL HISTORY:  Past Medical History:   Diagnosis Date    * * * SBE PROPHYLAXIS * * * 1998    Amox 500mg, take 4 tabs one hour prior to procedure.Takes this because of lymphedema secondary from leg surgey    Antiplatelet or antithrombotic long-term use     Arrhythmia     Central serous retinopathy 2001    Resolved 9/2001    CHRONIC NECK PAIN 1995    Depressive disorder     Depressive disorder, not elsewhere classified 2001    Elevated coronary artery calcium score=7/1/21 08/03/2021    Elevated coronary artery calcium score=7/1/21 08/03/2021    History of blood transfusion 04/2019 12/2020    during left hip pinning, during surgery for patella    Lateral epicondylitis     MIXED HYPERLIPIDEMIA, LDL GOAL <160 1998    LDL goal < 160    Motion sickness     MYXOID LIPOSARCOMA 2000    Left thigh, S/P excision, radiation  at [facility]    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    CHRONIC LEFT THIGH LYMPHEDEMA    Nontoxic multinodular goiter 2005    needs yearly US    Osteoporosis,  unspecified 2001    Other chronic pain     fx ribs left     Other lymphedema 2000    left thigh, gets regular PT for this    Overactive bladder     PAD (peripheral artery disease) (H24) 04/20/2018    PONV (postoperative nausea and vomiting)     SHINGLES 2001    Sprain of lumbosacral (joint) (ligament) 1995    right    Unspecified hearing loss 1998    chronic tinnitus    Unspecified tinnitus 1998     PAST SURGICAL HISTORY:  Past Surgical History:   Procedure Laterality Date    ARTHROPLASTY HIP Left 10/4/2019    Procedure: Removal of left femoral hardware with a conversion to left total hip arthroplasty using a Biomet Annalisa femoral stem with an OsseoTi acetabular shell and a dual mobility bearing surface;  Surgeon: Spencer Celeste MD;  Location: [facility] OR    BIOPSY  April 2000    BIOPSY BREAST SEED LOCALIZATION Right 12/14/2022    Procedure: right breast tag localized lumpectomy;  Surgeon: Shelly Carr MD;  Location:  OR    BIOPSY NODE SENTINEL Right 12/14/2022    Procedure: with right sentinel lymph node biopsy;  Surgeon: Shelly Carr MD;  Location:  OR    BYPASS GRAFT FEMOROPOPLITEAL Left 1/21/2019    Procedure: LEFT FEMORAL TO ABOVE KNEE POPLITEAL  BYPASS WITH POLYTETRAFLUOROETHYLENE GRAFT;  Surgeon: Shade Owens MD;  Location:  OR    COLONOSCOPY N/A 2/5/2016    Procedure: COMBINED COLONOSCOPY, SINGLE OR MULTIPLE BIOPSY/POLYPECTOMY BY BIOPSY;  Surgeon: Varun Stanley MD, MD;  Location:  GI    COLONOSCOPY N/A 12/10/2021    Procedure: COLONOSCOPY, WITH POLYPECTOMIES  USING COLD  SNARE,   CLIP APPLIED X2;  Surgeon: Dayton Luna MD;  Location:  GI    CYSTOSCOPY      EXCISION MALIG LESION>1.25CM  5/2000    Myxoid Liposarcoma      EXPLORE GROIN Right 5/1/2018    Procedure: EXPLORE GROIN;  EMERGENCY RIGHT FEMORAL EXPLORATION WITH FEMORAL ARTERY REPAIR.    EBL: 50mL;  Surgeon: Shade Owens MD;  Location: [facility] OR    HC COLP CERVIX/UPPER VAGINA  07/1997    Negative    HC  DILATION/CURETTAGE DIAG/THER NON OB  02/1997    Post menopausal bleeding on HRT, negative    HIP SURGERY Left 04/13/2019    IR ANGIOGRAM THROUGH CATHETER FOLLOW UP  12/20/2018    IR ANGIOGRAM THROUGH CATHETER FOLLOW UP  12/21/2018    IR LOWER EXTREMITY ANGIOGRAM LEFT  12/19/2018    OPEN REDUCTION INTERNAL FIXATION PATELLA Left 12/21/2020    Procedure: Left partial patella fracture excision with advancement of the quadriceps tendon;  Surgeon: Stephen Rhodes MD;  Location: [facility] OR    OPEN REDUCTION INTERNAL FIXATION RODDING INTRAMEDULLARY TIBIA Left 12/21/2020    Procedure: Open reduction intramedullary nailing of left tibia fracture;  Surgeon: Stephen Rhodes MD;  Location: [facility] OR    REMOVE HARDWARE KNEE Left 5/31/2022    Procedure: Left knee patella hardware removal;  Surgeon: Spencer Celeste MD;  Location: [facility] OR    REPAIR TENDON QUADRICEPS Left 7/28/2021    Procedure: Left knee quadricepsplasty with intraoperative patellar fracture requiring open reduction and internal fixation of the patella;  Surgeon: Spencer Celeste MD;  Location: [facility] OR    REPAIR TENDON QUADRICEPS Left 5/31/2022    Procedure: Open left knee VY quadricepsplasty with hardware removal and allograft;  Surgeon: Spencer Celeste MD;  Location: [facility] OR    VASCULAR SURGERY  04/30/2018    Left SFA stent in bypass graft    ZZC APPENDECTOMY      Appendectomy    ZZC NONSPECIFIC PROCEDURE  04/2000    Open Biopsy Left Thigh Liposarcoma    ZZHC COLONOSCOPY THRU STOMA, DIAGNOSTIC  2006    due 2010     CURRENT MEDICATIONS:  Current Outpatient Medications   Medication Sig Dispense Refill    calcium carbonate 600 mg-vitamin D 400 units (CALTRATE) 600-400 MG-UNIT per tablet Take 1 chew tab by mouth daily      clopidogrel (PLAVIX) 75 MG tablet TAKE 1 TABLET BY MOUTH EVERY DAY 90 tablet 3    denosumab (PROLIA) 60 MG/ML SOSY injection       evolocumab (REPATHA) 140 MG/ML prefilled autoinjector Inject 1 mL (140 mg) Subcutaneous every 14  days 6 mL 3    ezetimibe (ZETIA) 10 MG tablet TAKE 1 TABLET (10 MG) BY MOUTH DAILY. 90 tablet 1    letrozole (FEMARA) 2.5 MG tablet Take 1 tablet (2.5 mg) by mouth daily 90 tablet 3    levothyroxine (SYNTHROID/LEVOTHROID) 75 MCG tablet Take 1 tablet (75 mcg) by mouth daily 90 tablet 3    magnesium oxide 200 MG TABS       multivitamin, therapeutic (THERA-VIT) TABS tablet Take 1 tablet by mouth daily      nitroFURantoin macrocrystal-monohydrate (MACROBID) 100 MG capsule Take 1 capsule after intercourse 30 capsule 0    rivaroxaban ANTICOAGULANT (XARELTO) 2.5 MG TABS tablet Take 1 tablet (2.5 mg) by mouth 2 times daily 180 tablet 3    rosuvastatin (CRESTOR) 20 MG tablet Take 1 tablet (20 mg) by mouth daily 90 tablet 1    solifenacin (VESICARE) 5 MG tablet Take 1 tablet (5 mg) by mouth daily 90 tablet 3     ALLERGIES:  Allergies   Allergen Reactions    Celexa [Citalopram Hydrobromide]      Decreased libido     SOCIAL HISTORY:  Social History     Socioeconomic History    Marital status:      Spouse name: Chris    Number of children: 3    Years of education: 14    Highest education level: Associate degree: academic program   Occupational History    Occupation: at home     Employer: NONE    Tobacco Use    Smoking status: Never     Passive exposure: Never    Smokeless tobacco: Never   Vaping Use    Vaping status: Never Used   Substance and Sexual Activity    Alcohol use: Never    Drug use: Never    Sexual activity: Yes     Partners: Male     Birth control/protection: Male Surgical     Comment:  had a vasectomy   Other Topics Concern    Parent/sibling w/ CABG, MI or angioplasty before 65F 55M? No   Social History Narrative    Not on file     Social Determinants of Health     Financial Resource Strain: Low Risk  (10/1/2023)    Financial Resource Strain     Within the past 12 months, have you or your family members you live with been unable to get utilities (heat, electricity) when it was really needed?: No   Food  Insecurity: Low Risk  (10/1/2023)    Food Insecurity     Within the past 12 months, did you worry that your food would run out before you got money to buy more?: No     Within the past 12 months, did the food you bought just not last and you didn t have money to get more?: No   Transportation Needs: Low Risk  (10/1/2023)    Transportation Needs     Within the past 12 months, has lack of transportation kept you from medical appointments, getting your medicines, non-medical meetings or appointments, work, or from getting things that you need?: No   Physical Activity: Inactive (12/16/2021)    Exercise Vital Sign     Days of Exercise per Week: 0 days     Minutes of Exercise per Session: 0 min   Stress: Stress Concern Present (12/16/2021)    Citizen of [place] [employer] Occupational Health - Occupational Stress Questionnaire     Feeling of Stress : To some extent   Social Connections: Moderately Integrated (12/16/2021)    Social Connection and Isolation Panel [NHANES]     Frequency of Communication with Friends and Family: Never     Frequency of Social Gatherings with Friends and Family: Never     Attends [employer] Services: More than 4 times per year     Active Member of Clubs or Organizations: Yes     Attends Club or Organization Meetings: Not on file     Marital Status:    Interpersonal Safety: Low Risk  (10/2/2023)    Interpersonal Safety     Do you feel physically and emotionally safe where you currently live?: Yes     Within the past 12 months, have you been hit, slapped, kicked or otherwise physically hurt by someone?: No     Within the past 12 months, have you been humiliated or emotionally abused in other ways by your partner or ex-partner?: No   Housing Stability: Low Risk  (10/1/2023)    Housing Stability     Do you have housing? : Yes     Are you worried about losing your housing?: No     FAMILY HISTORY:  Family History   Problem Relation Age of Onset    C.A.D. Father         MI 57    Alcohol/Drug Father        "  etoh    Coronary Artery Disease Father     Obesity Mother     Osteoporosis Mother     Colon Cancer Brother 70    Hyperlipidemia Son     Anxiety Disorder Son     Hyperlipidemia Son         very high, experimental drug    C.A.D. Paternal Grandmother         ascvd    Diabetes Maternal Grandmother     Cancer Maternal Grandmother     C.A.D. Paternal Uncle         Mi  age 48    Cancer Maternal Aunt         pancreatic CA    Hyperlipidemia Son     Hyperlipidemia Cousin      REVIEW OF SYSTEMS:  CONSTITUTIONAL:no malaise, fatigue, or other general symptoms  EYES: no subjective changes in visual acuity, no photophobia  ENT/MOUTH: no complaints of rhinorrhea, nasal congestion, sore throat, hearing changes  RESP:no SOB  CV: no c/o exertional chest pressure or SALDANA  GI: No abdominal pain, constipation, change in bowel movements, nausea, pyrosis, BRBPR  :no polyuria or polydipsia, no dysuria, no gross hematuria  MUSCULOSKELATAL:no arthalgias or myalgias  INTEGUMENTARY/SKIN: no pruritis, rashes, or moles with recent change in size, shape, or pigmentation  NEURO: no gross sensory or motor symptoms, no dizziness, no confusion  ENDOCRINE: no polyuria or polydipsia, no heat or cold intolerance  HEME/ALLERGY/IMMUNE: no fevers, chills, night sweats, or unwanted weight loss  PSYCHIATRIC: no depression, anxiety, or internal stimuli  EXAM:  /82 (BP Location: Right arm, Patient Position: Chair, Cuff Size: Adult Regular)   Pulse 69   Ht 5' 4.5\" (1.638 m)   Wt 162 lb 9.6 oz (73.8 kg)   LMP  (LMP Unknown)   SpO2 97%   BMI 27.48 kg/m    BMI: Body mass index is 27.48 kg/m .  GENERAL APPEARANCE:  Pleasant  Healthy appearing female , alert, active, no distress cooperative.  EXAM:  EYES: clear conjunctiva, no cataracts, no obvious fundoscopic abnormalities  HENT: oropharynx, nares, and TMs are WNL  NECK: no JVD, thyromegaly or lymphadenopathy, no cervical bruits  RESP: clear to auscultation without rales, wheezes, or " rhonchi  CV: RRR, no murmurs, gallops, or rubs  LYMPH: no cervical , axillary, or inguinal lymphadenopathy appreciated  GI: NABS, ND/NT, no masses or organomegally appreciated  MS: Left lower extremity lymphedema stable   SKIN: no nevi clinically suspicious for malignancy are noted  NEURO: CN II-XII intact, no localizing sensory or motor abnoramlities noted, DTRs symmetrical bilaterally  PSYCH: Mental status exam reveals the pt to have normal mood and affect. There is no disorder of thought form or content. There is no response to internal stimuli. There is no suicidal or homicidal ideation.  ARTERIAL DUPLEX ULTRASOUND LEFT LOWER EXTREMITY  5/12/2023 10:27 AM     HISTORY: 73-year-old woman with history of peripheral arterial disease  and left femoral to above-knee popliteal PTFE surgical arterial bypass  graft.     COMPARISON: March 28, 2022.     TECHNIQUE: Color Doppler and spectral waveform analysis obtained  throughout the left lower extremity surgical arterial bypass graft and  adjacent native arteries.     FINDINGS: Bypass graft is widely patent. No focal velocity elevation  to suggest stenosis. Waveforms appear normal.                                                                      IMPRESSION: Widely patent left lower extremity surgical arterial  bypass graft. No suggestion of stenosis.     JOMAR MOLINA MD     A/P:  1. Hyperlipidemia LDL goal <70  Well-controlled with Repatha, Zetia and Crestor continue the same repeat fasting lipids, CMP labs in 3 months then virtual visit  2. Lymphedema of left leg  She was seen and evaluated recently lymphedema therapist continue the same plan elevate the leg,  lose weight use compression stockings and pump therapy  3. History of arterial bypass of Left lower extremity  Currently taking Plavix and low-dose Xarelto 2.5 mg twice a day tolerating without any problems and she is tolerating crestor 20 mg daily along with Zetia 10 mg daily and PCSK 9 inhibitor   She is  able to walk half a mile without any problems  She is scheduled to undergo leg arterial duplex and ADRIAN this month then visit with Dr. Owens  4. Hypothyroidism, unspecified type  Clinically euthyroid and recent thyroid function tests are normal continue the same and take medication as advised  Repeat thyroid panel in 3 months order placed  Refilled all her medications  40 minutes spent on the date of the encounter doing chart review, review of recent labs, previous imaging studies, history, exam, documentation and addressed above-mentioned multiple issues   The longitudinal care of plan for the above diagnoses was addressed during this visit. Due to added complexity of care, we will continue to supprt Angeli Jacobson and the subsequent management of this/these conditions and with ongoing continuity of care for this/these conditions.    Tiesha Prince MD, JASON, [facility], [place]  Vascular Medicine  Clinical Lipidologist

## 2024-05-03 NOTE — PROGRESS NOTES
This HSAT was performed using a Noxturnal T3 device which recorded snore, sound, movement activity, body position, nasal pressure, oronasal thermal airflow, pulse, oximetry and both chest and abdominal respiratory effort. HSAT data was restricted to the time patient states they were in bed.     HSAT was scored using 1B 4% hypopnea rule.     AHI: 8.9  Snoring was reported as loud.  Time with SpO2 below 89% was 48.2 minutes.   Overall signal quality was good     Pt will follow up with sleep provider to determine appropriate therapy.     Ordering Provider, Charley Martinez PA-C C. Oyugi, BA, RPSGT, RST System Clinical Specialist/ 5/3/2024

## 2024-05-03 NOTE — PROGRESS NOTES
Pt returned HST device. It was downloaded and forwarded data to the clinical specialist for scoring.   Jennifer Wagoner MA

## 2024-05-03 NOTE — PATIENT INSTRUCTIONS
Continue current medications     Complete leg ultrasound and then see Dr. Owens     Fasting labs in 3 months then virtual visit     Refilled medications

## 2024-05-03 NOTE — PROGRESS NOTES
"Patient is here to discuss follow up    /82 (BP Location: Right arm, Patient Position: Chair, Cuff Size: Adult Regular)   Pulse 69   Ht 5' 4.5\" (1.638 m)   Wt 162 lb 9.6 oz (73.8 kg)   LMP  (LMP Unknown)   SpO2 97%   BMI 27.48 kg/m      Questions patient would like addressed today are: N/A.    Refills are needed: No    Has homecare services and agency name:  [facility]    "

## 2024-05-07 DIAGNOSIS — E78.5 HYPERLIPIDEMIA LDL GOAL <70: ICD-10-CM

## 2024-05-09 ENCOUNTER — OFFICE VISIT (OUTPATIENT)
Dept: PEDIATRICS | Facility: CLINIC | Age: 74
End: 2024-05-09
Payer: COMMERCIAL

## 2024-05-09 VITALS
HEIGHT: 65 IN | OXYGEN SATURATION: 98 % | WEIGHT: 162.6 LBS | TEMPERATURE: 98.3 F | HEART RATE: 84 BPM | BODY MASS INDEX: 27.09 KG/M2 | RESPIRATION RATE: 12 BRPM | SYSTOLIC BLOOD PRESSURE: 122 MMHG | DIASTOLIC BLOOD PRESSURE: 70 MMHG

## 2024-05-09 DIAGNOSIS — R05.1 ACUTE COUGH: ICD-10-CM

## 2024-05-09 DIAGNOSIS — C49.9 MYXOID LIPOSARCOMA (H): ICD-10-CM

## 2024-05-09 DIAGNOSIS — J02.9 SORETHROAT: Primary | ICD-10-CM

## 2024-05-09 DIAGNOSIS — E78.5 HYPERLIPIDEMIA LDL GOAL <70: ICD-10-CM

## 2024-05-09 DIAGNOSIS — H11.32 SUBCONJUNCTIVAL HEMORRHAGE OF LEFT EYE: ICD-10-CM

## 2024-05-09 LAB
DEPRECATED S PYO AG THROAT QL EIA: NEGATIVE
GROUP A STREP BY PCR: NOT DETECTED

## 2024-05-09 PROCEDURE — 87651 STREP A DNA AMP PROBE: CPT | Performed by: PHYSICIAN ASSISTANT

## 2024-05-09 PROCEDURE — 87635 SARS-COV-2 COVID-19 AMP PRB: CPT | Performed by: PHYSICIAN ASSISTANT

## 2024-05-09 PROCEDURE — 99214 OFFICE O/P EST MOD 30 MIN: CPT | Performed by: PHYSICIAN ASSISTANT

## 2024-05-09 RX ORDER — RESPIRATORY SYNCYTIAL VIRUS VACCINE 120MCG/0.5
0.5 KIT INTRAMUSCULAR ONCE
Qty: 1 EACH | Refills: 0 | Status: CANCELLED | OUTPATIENT
Start: 2024-05-09 | End: 2024-05-09

## 2024-05-09 ASSESSMENT — PAIN SCALES - GENERAL: PAINLEVEL: EXTREME PAIN (8)

## 2024-05-09 ASSESSMENT — ENCOUNTER SYMPTOMS: COUGH: 1

## 2024-05-09 NOTE — TELEPHONE ENCOUNTER
Hello I do not see another encounter for this medication can we please get this sent over please and thank you

## 2024-05-09 NOTE — PROGRESS NOTES
"Lab Results   Component Value Date    A1C 5.7 12/21/2023    A1C 5.8 06/01/2023    A1C 5.5 01/17/2019    A1C 5.8 12/19/2018    A1C 5.8 04/30/2018             Assessment & Plan     Sorethroat  Likely viral. Encouraged fluids, rest, salt water rinses.tylenol for pain. Patient to follow up in clinic is symptoms persist or worsen.   Postnasal drip likely contributing, clear throat often in room. Trial of Flonase once daily  - Streptococcus A Rapid Screen w/Reflex to PCR - Clinic Collect  - Group A Streptococcus PCR Throat Swab  - Symptomatic COVID-19 Virus (Coronavirus) by PCR Nose    Subconjunctival hemorrhage of left eye  Left eye, worsening per pt, no pain or vision changes. Had for 10 days without improvement. She will see her eye provider    Acute cough  Viral. Lungs clear.  Post nasal drip likely contributing     Sxs are likely viral. Encouraged fluids and  Rest.  Nasal saline for nasal congestion. If sxs persist or worse f/u in clinic     Mixoid liposarcoma    stable          BMI  Estimated body mass index is 27.48 kg/m  as calculated from the following:    Height as of this encounter: 1.638 m (5' 4.5\").    Weight as of this encounter: 73.8 kg (162 lb 9.6 oz).             Carmel Suh is a 74 year old, presenting for the following health issues:  Cough      5/9/2024    11:21 AM   Additional Questions   Roomed by Merle   Accompanied by Ju Perez         5/9/2024    11:21 AM   Patient Reported Additional Medications   Patient reports taking the following new medications no     Cough    History of Present Illness       Reason for visit:  To learn if this needs to be medically treated  Symptom onset:  3-7 days ago  Symptoms include:  Persistent cough with very swollen, painful tonsils.  Symptom intensity:  Severe  Symptom progression:  Staying the same  Had these symptoms before:  No  What makes it better:  Tylenol, warm tea to drink    She eats 2-3 servings of fruits and vegetables daily.She consumes " "0 sweetened beverage(s) daily.She exercises with enough effort to increase her heart rate 9 or less minutes per day.  She exercises with enough effort to increase her heart rate 3 or less days per week.   She is taking medications regularly.       1. Woke up Sunday morning noted really sore throat. It is staying the same. Does have nc, runny nose. Does have post nasal drip.  Cough is productive    No fevers. No nausea vomiting    Yesterday covid test neg    No others ill.  Using Tylenol on occasion      Is eating   Drinking fluids                    Objective    /70 (BP Location: Right arm, Patient Position: Sitting, Cuff Size: Adult Regular)   Pulse 84   Temp 98.3  F (36.8  C) (Tympanic)   Resp 12   Ht 1.638 m (5' 4.5\")   Wt 73.8 kg (162 lb 9.6 oz)   LMP  (LMP Unknown)   SpO2 98%   BMI 27.48 kg/m    Body mass index is 27.48 kg/m .  Physical Exam   GENERAL: alert and no distress  EYES: subconjunctival hemorrhage left eye cover 80%   HENT: normal cephalic/atraumatic, ear canals and TM's normal, oropharynx clear, oral mucous membranes moist, and nasal congestion on left  NECK: no adenopathy, no asymmetry, masses, or scars  RESP: lungs clear to auscultation - no rales, rhonchi or wheezes  CV: regular rate and rhythm, normal S1 S2, no S3 or S4, no murmur, click or rub, no peripheral edema  SKIN: no suspicious lesions or rashes  PSYCH: mentation appears normal, affect normal/bright            Signed Electronically by: Keyanna Servin PA-C    "

## 2024-05-10 ENCOUNTER — MYC MEDICAL ADVICE (OUTPATIENT)
Dept: ORTHOPEDICS | Facility: CLINIC | Age: 74
End: 2024-05-10
Payer: COMMERCIAL

## 2024-05-10 ENCOUNTER — TELEPHONE (OUTPATIENT)
Dept: INTERNAL MEDICINE | Facility: CLINIC | Age: 74
End: 2024-05-10
Payer: COMMERCIAL

## 2024-05-10 LAB — SARS-COV-2 RNA RESP QL NAA+PROBE: NEGATIVE

## 2024-05-10 NOTE — TELEPHONE ENCOUNTER
See MyChart message and recommend if patient should wait until pain return for another injection or if now is okay.     LOV on 10/11/2023 for Right thumb flexor tenosynovitis with CSI that day also.     Today we discussed the underlying etiology/pathology of patient's   1. Flexor tenosynovitis of thumb    2. 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  - We discussed the patient does have flexor tenosynovitis of her right thumb with pain over the A1 pulley site on the volar surface.  - We discussed treatment options including topical ice, topical anti-inflammatory such as Voltaren 1% gel to be applied up to 3 times a day, activity modification to minimize gripping grasping and direct pressure to this area as well as consideration for oral versus cortisone injection.  - Patient cannot take oral anti-inflammatories due to being on dual anticoagulation therapy with Plavix and Xarelto for cardiovascular history  - Ultimately patient would like to proceed with injection which was done today to the volar surface of the right thumb at the A1 pulley level.  Risk and benefits thoroughly discussed before proceeding with injection which included risk of infection, bleeding as well as paresthesia of the thumb temporarily  - Patient will monitor her symptoms over the next 3 weeks and if not improved will come back for repeat assessment  - Patient should utilize ice to the thumb at least 3 times a day for 15-minute intervals with a skin barrier to minimize skin injury or freezing and utilize Tylenol up to 3000 mg daily for discomfort.  - Patient may start utilizing topical Voltaren 1% gel over-the-counter to the length of the right thumb up to 3 times a day after skin  is healed from the injection procedure today.       Bri sAkew, ATC

## 2024-05-10 NOTE — CONFIDENTIAL NOTE
Patient certainly could have a repeat injection if it is bothering her.  If she isn't having pain but only stiffness then I would recommend trying topical OTC voltaren 1% gel twice a day for a week, icing in the evening to see if that improves her symptoms.  She can take daily tylenol for pain if needed.  If she failed that treatment and then would like an injection she can schedule an appt with any of us based on location preference.   Saturnino Quinn PA-C

## 2024-05-10 NOTE — TELEPHONE ENCOUNTER
Pt calls, wants to discuss labs that are futured by outside providers, informed of endocrinology message to  container for urine test and and will need to schedule fasting lab appointment, pt agrees, confirmed PCP at [facility], pt transferred here as maybe using AV lab, recommend follow up with ordering providers if further questions  Franci Hanson RN, BSN  [facility]

## 2024-05-13 NOTE — TELEPHONE ENCOUNTER
So it looks like it was sent to another pharmacy and the pt would like to fill with us. Please verify and send new rx. Thank you!!

## 2024-05-14 ENCOUNTER — TELEPHONE (OUTPATIENT)
Dept: CARDIOLOGY | Facility: CLINIC | Age: 74
End: 2024-05-14
Payer: COMMERCIAL

## 2024-05-14 DIAGNOSIS — I25.10 CORONARY ARTERY DISEASE INVOLVING NATIVE CORONARY ARTERY OF NATIVE HEART WITHOUT ANGINA PECTORIS: Primary | ICD-10-CM

## 2024-05-14 DIAGNOSIS — E78.5 HYPERLIPIDEMIA LDL GOAL <70: ICD-10-CM

## 2024-05-14 RX ORDER — CONTAINER,EMPTY
EACH MISCELLANEOUS
Qty: 1 EACH | Refills: 1 | Status: SHIPPED | OUTPATIENT
Start: 2024-05-14

## 2024-05-14 NOTE — TELEPHONE ENCOUNTER
Pt is requesting new rx for    Sharps container regular    Did not see on active med list please verify and send new rx. Thank you!    [facility]/mail pharmacy  [phone]

## 2024-05-15 ENCOUNTER — OFFICE VISIT (OUTPATIENT)
Dept: OTHER | Facility: CLINIC | Age: 74
End: 2024-05-15
Attending: INTERNAL MEDICINE
Payer: COMMERCIAL

## 2024-05-15 ENCOUNTER — TRANSFERRED RECORDS (OUTPATIENT)
Dept: MULTI SPECIALTY CLINIC | Facility: CLINIC | Age: 74
End: 2024-05-15

## 2024-05-15 ENCOUNTER — HOSPITAL ENCOUNTER (OUTPATIENT)
Dept: ULTRASOUND IMAGING | Facility: CLINIC | Age: 74
Discharge: HOME | End: 2024-05-15
Attending: SURGERY
Payer: COMMERCIAL

## 2024-05-15 VITALS — DIASTOLIC BLOOD PRESSURE: 81 MMHG | SYSTOLIC BLOOD PRESSURE: 152 MMHG | HEART RATE: 77 BPM

## 2024-05-15 DIAGNOSIS — Z98.890 HISTORY OF FEMOROPOPLITEAL BYPASS: Primary | ICD-10-CM

## 2024-05-15 DIAGNOSIS — I73.9 PAD (PERIPHERAL ARTERY DISEASE) (H): ICD-10-CM

## 2024-05-15 DIAGNOSIS — Z98.890 HISTORY OF FEMOROPOPLITEAL BYPASS: ICD-10-CM

## 2024-05-15 DIAGNOSIS — I89.0 LYMPHEDEMA OF LEFT LEG: ICD-10-CM

## 2024-05-15 DIAGNOSIS — Z95.828 HISTORY OF ARTERIAL BYPASS OF LOWER EXTREMITY: ICD-10-CM

## 2024-05-15 DIAGNOSIS — E78.5 HYPERLIPIDEMIA LDL GOAL <70: ICD-10-CM

## 2024-05-15 LAB — RETINOPATHY: NORMAL

## 2024-05-15 PROCEDURE — 93924 LWR XTR VASC STDY BILAT: CPT

## 2024-05-15 PROCEDURE — G0463 HOSPITAL OUTPT CLINIC VISIT: HCPCS | Mod: 25 | Performed by: SURGERY

## 2024-05-15 PROCEDURE — 99213 OFFICE O/P EST LOW 20 MIN: CPT | Performed by: SURGERY

## 2024-05-15 PROCEDURE — 93926 LOWER EXTREMITY STUDY: CPT | Mod: LT

## 2024-05-15 NOTE — PROGRESS NOTES
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  At that time she had arterial reconstruction using a PTFE interposition graft.  That graft occluded in 2019 and I performed a redo left femoral to above-knee popliteal bypass with 6 mm ringed PTFE graft.  She is not diabetic and does not smoke.  She denies claudication.  She does have ongoing issues with left leg lymphedema and utilizes a thigh-high compression stocking of 30-40 mmHg on a daily basis.  She presents today for annual left leg graft ultrasound and an ADRIAN with exercise.    She follows with Dr. Prince in vascular medicine.  She is on empiric low-dose Xarelto and continued Plavix.  At today's visit she had no complaints.    Exam:  Well-developed female alert and oriented x 3.  Blood pressure 152/81 with a pulse of 77.  She is wearing a thigh-high compression stocking.  Lymphedematous changes of the left leg.  Easily palpable DP pulses bilaterally.    Imaging:  Resting and postexercise ABIs today are normal.  Ultrasound demonstrates a widely patent left femoropopliteal bypass graft.    ASSESSMENT:  1.  5 years status post redo left common femoral to above-knee popliteal bypass using 6 mm PTFE graft clinically doing very well.  Widely patent graft with palpable pedal pulses and normal noninvasive studies.    2.  Status post resection of left thigh sarcoma with subsequent radiation therapy in remote past and resultant moderate left leg lymphedema.    PLAN:  I reviewed all the above with Angeli and her .  She will continue her medical regimen including low-dose Xarelto and Plavix.  I have no vascular surgical concerns.  She will continue utilizing a left leg thigh-high compression stocking of 30-40 mmHg pressure.  Vascular surgical follow-up will be in 1 year for a repeat left leg graft ultrasound and an ADRIAN with exercise.    Total length of this  encounter was 20 minutes with time spent reviewing studies, interviewing and examining the patient, answering questions, and coordinating a treatment plan.    Elijah Owens MD

## 2024-05-15 NOTE — PROGRESS NOTES
[facility]        Patient is here for a  follow up.    Pt is currently taking Aspirin, Statin, and Xarelto.    BP (!) 152/81 (BP Location: Left arm, Patient Position: Chair, Cuff Size: Adult Regular)   Pulse 77   LMP  (LMP Unknown)     The provider has been notified that the patient has no concerns.     Questions patient would like addressed today are: N/A.    Refills are needed: N/A    Has homecare services and agency name:  Ninfa Bowser MA

## 2024-05-17 ENCOUNTER — VIRTUAL VISIT (OUTPATIENT)
Dept: INTERNAL MEDICINE | Facility: CLINIC | Age: 74
End: 2024-05-17
Payer: COMMERCIAL

## 2024-05-17 DIAGNOSIS — J32.9 SINUSITIS, UNSPECIFIED CHRONICITY, UNSPECIFIED LOCATION: Primary | ICD-10-CM

## 2024-05-17 DIAGNOSIS — J06.9 UPPER RESPIRATORY TRACT INFECTION, UNSPECIFIED TYPE: ICD-10-CM

## 2024-05-17 DIAGNOSIS — J03.90 TONSILLITIS: ICD-10-CM

## 2024-05-17 PROCEDURE — 99213 OFFICE O/P EST LOW 20 MIN: CPT | Performed by: NURSE PRACTITIONER

## 2024-05-17 RX ORDER — CEFDINIR 300 MG/1
300 CAPSULE ORAL 2 TIMES DAILY
Qty: 20 CAPSULE | Refills: 0 | Status: SHIPPED | OUTPATIENT
Start: 2024-05-17 | End: 2024-07-23

## 2024-05-17 NOTE — PROGRESS NOTES
"Angeli is a 74 year old who is being evaluated via a billable video visit.    What phone number would you like to be contacted at? [phone]  How would you like to obtain your AVS? MyChart      Assessment & Plan     Sinusitis, unspecified chronicity, unspecified location    - cefdinir (OMNICEF) 300 MG capsule; Take 1 capsule (300 mg) by mouth 2 times daily    Upper respiratory tract infection, unspecified type    - cefdinir (OMNICEF) 300 MG capsule; Take 1 capsule (300 mg) by mouth 2 times daily    Tonsillitis    - cefdinir (OMNICEF) 300 MG capsule; Take 1 capsule (300 mg) by mouth 2 times daily          BMI  Estimated body mass index is 27.48 kg/m  as calculated from the following:    Height as of 5/9/24: 1.638 m (5' 4.5\").    Weight as of 5/9/24: 73.8 kg (162 lb 9.6 oz).     9 minutes spent by me on the date of the encounter doing chart review, history and exam, documentation and further activities per the note      Patient Instructions   Cefdinir twice daily for 10 days       Subjective   Angeli is a 74 year old, presenting for the following health issues:  Sinus infection (She has a sinus infection and lingering virus. She started with a sore throat and swollen tonsils on 5/4/24. Was seen on 5/9/24, was tested for strep, covid, sars, and RSV. It all came back negative. A week later sinus infection started. This is the 3rd day.)      5/17/2024     9:09 AM   Additional Questions   Roomed by Karie Merino MA   Accompanied by Herself     Video Start Time: 9:18 AM- she wanted land line called so telephone visit done       HPI     She was seen 5/9 for sore throat and thought to be viral   Symptom began on 5/4/2024  Symptoms changed   Swollen glands and painful on the 9 th   Cough was present   Cough cleared     Tonsil right side gone down   Left side not better and still sore   Now tenderness in left cheek and teeth - feels like previous sinus infection       Review of Systems  Constitutional, neuro, ENT, " endocrine, pulmonary, cardiac, gastrointestinal, genitourinary, musculoskeletal, integument and psychiatric systems are negative, except as otherwise noted.      Objective           Vitals:  No vitals were obtained today due to virtual visit.    Physical Exam   GENERAL: alert and no distress    RESP: No audible wheeze, cough    PSYCH: Appropriate affect, tone, and pace of words          Video-Visit Details    Type of service:  Video Visit   Video End Time:9:25 AM  Originating Location (pt. Location): Home    Distant Location (provider location):  On-site  Platform used for Video Visit: Telephone  Signed Electronically by: ELVA Orozco CNP

## 2024-05-20 DIAGNOSIS — N32.81 OAB (OVERACTIVE BLADDER): ICD-10-CM

## 2024-05-20 RX ORDER — SOLIFENACIN SUCCINATE 5 MG/1
5 TABLET, FILM COATED ORAL DAILY
Qty: 90 TABLET | Refills: 1 | Status: SHIPPED | OUTPATIENT
Start: 2024-05-20 | End: 2024-08-15

## 2024-05-28 ENCOUNTER — OFFICE VISIT (OUTPATIENT)
Dept: ORTHOPEDICS | Facility: CLINIC | Age: 74
End: 2024-05-28
Payer: COMMERCIAL

## 2024-05-28 VITALS
DIASTOLIC BLOOD PRESSURE: 84 MMHG | BODY MASS INDEX: 27.66 KG/M2 | WEIGHT: 162 LBS | SYSTOLIC BLOOD PRESSURE: 125 MMHG | HEIGHT: 64 IN

## 2024-05-28 DIAGNOSIS — M65.949 FLEXOR TENOSYNOVITIS OF THUMB: Primary | ICD-10-CM

## 2024-05-28 PROCEDURE — 20550 NJX 1 TENDON SHEATH/LIGAMENT: CPT | Mod: F5 | Performed by: STUDENT IN AN ORGANIZED HEALTH CARE EDUCATION/TRAINING PROGRAM

## 2024-05-28 PROCEDURE — 99213 OFFICE O/P EST LOW 20 MIN: CPT | Mod: 25 | Performed by: STUDENT IN AN ORGANIZED HEALTH CARE EDUCATION/TRAINING PROGRAM

## 2024-05-28 RX ORDER — ROPIVACAINE HYDROCHLORIDE 5 MG/ML
1 INJECTION, SOLUTION EPIDURAL; INFILTRATION; PERINEURAL
Status: SHIPPED | OUTPATIENT
Start: 2024-05-28

## 2024-05-28 RX ORDER — BETAMETHASONE SODIUM PHOSPHATE AND BETAMETHASONE ACETATE 3; 3 MG/ML; MG/ML
6 INJECTION, SUSPENSION INTRA-ARTICULAR; INTRALESIONAL; INTRAMUSCULAR; SOFT TISSUE
Status: SHIPPED | OUTPATIENT
Start: 2024-05-28

## 2024-05-28 RX ADMIN — BETAMETHASONE SODIUM PHOSPHATE AND BETAMETHASONE ACETATE 6 MG: 3; 3 INJECTION, SUSPENSION INTRA-ARTICULAR; INTRALESIONAL; INTRAMUSCULAR; SOFT TISSUE at 09:04

## 2024-05-28 RX ADMIN — ROPIVACAINE HYDROCHLORIDE 1 ML: 5 INJECTION, SOLUTION EPIDURAL; INFILTRATION; PERINEURAL at 09:04

## 2024-05-28 NOTE — LETTER
5/28/2024         RE: Angeli Jacobson  [id number]  [facility] [address]        Dear Colleague,    Thank you for referring your patient, Angeli Jacobson, to the [facility]. Please see a copy of my visit note below.    ASSESSMENT & PLAN    Angeli was seen today for follow up.    Diagnoses and all orders for this visit:    Flexor tenosynovitis of thumb  -     Hand / Upper Extremity Injection/Arthrocentesis: R thumb A1      This issue is acute on chronic and Unchanged.  Angeli presents to clinic today discuss her acute on chronic right thumb pain and stiffness.  Her previous radiographs were reviewed and shows some mild degenerative changes at the IP joint of the thumb.  On exam today the patient has stiffness of the right thumb with pain at the first MCP joint and reports feelings of triggering, although we are unable to provoke any triggering today.  She has previously had a corticosteroid injection to the A1 pulley of the thumb that provided 6 months of relief from her symptoms.  We discussed that in the treatment of trigger fingers usually a referral is made to the surgery team if the patient fails a trigger point injection, however given she has gotten at least 6 months of relief it is reasonable to repeat steroid injections today, the patient was amenable to repeating an injection rather than proceeding with surgical referral.  We determined the following plan:  - CSI to the right thumb A1 pulley performed today under ultrasound guidance, see procedure note for details  - She can otherwise continue to use over-the-counter pain medicines, ice, heat as needed  - She can follow-up in our clinic as needed.  If she gets less than 6 months of relief from this injection, would proceed with referral to the hand surgery team.        Trenton Sr, DO  [facility]    SUBJECTIVE- Interim History May 28,  "2024    Chief Complaint   Patient presents with     Right Thumb - Follow Up       Angeli Jacobson is a 74 year old female who is seen in f/u up for right thumb pain. Since last visit on 10/11/23 with Saturnino Quinn patient states her pain has returned.  - Now ~ 9 months from initial onset    Worsened by: overuse of thumb, gripping her hurtado  Better with: cortisone injection, tylenol  Treatments tried: corticosteroid injection (most recent date: 10/11/23) that provided  6 month(s) of relief, voltaren, ice, tylenol  Associated symptoms:  swelling, stiffness    The patient is seen by themselves.  The patient is Right handed    Orthopedic/Surgical history: NO  Social History/Occupation: retired      REVIEW OF SYSTEMS:  Review of Systems    OBJECTIVE:  /84   Ht 1.626 m (5' 4\")   Wt 73.5 kg (162 lb)   LMP  (LMP Unknown)   BMI 27.81 kg/m     General: healthy, alert and in no distress  Skin: no suspicious lesions or rash.  CV: distal perfusion intact   Resp: normal respiratory effort without conversational dyspnea   Psych: normal mood and affect  Gait: NORMAL  Neuro: Normal light sensory exam of RU extremity     Hand Exam    Left  Right   Inspection No atrophy, erythema , echymosis, or deformity  No atrophy, erythema , echymosis, or deformity    Palpation         Tenderness over    No tenderness to palpation of radial styloid, DRUJ, TFCC, scaphoid/snuffbox TTP 1st MCP  No tenderness to palpation of radial styloid, DRUJ, TFCC, scaphoid/snuffbox   Range of Motion        1st digit IP flex/ext Normal Decreased extension      DIP flexion/extension  Normal 2nd-5th Normal 2nd-5th      PIP flexion/extension  Normal 2nd-5th Normal 2nd-5th      MCP flexion/extension Normal Normal   Strength      1st digit IP flex/ext Full Full, painful    DIP flexion/extension Full Full    PIP flexion/extension  Full Full    MCP flexion/extension Full Full   Pain with resisted None None   Vascular   Intact  Intact    Special Tests   1st MCP " valgus 0/30 normal   Neg CMC grind  No triggering evident  Able to make 'OK' sign (AIN)  Intact resisted abduction of digits    Sensation Intact to median, ulnar, and radial nerve distributions         RADIOLOGY:  Final results and radiologist's interpretation, available in the [facility] health record.  Images were reviewed with the patient in the office today.  My personal interpretation of the performed imaging: Radiographs from 10/2/2023 were reviewed and show mild narrowing of the thumb IP joint.  Otherwise well-maintained joint spaces.    Hand / Upper Extremity Injection/Arthrocentesis: R thumb A1    Date/Time: 5/28/2024 9:04 AM    Performed by: Trenton Sr DO  Authorized by: Trenton Sr DO    Indications:  Pain, tendon swelling and therapeutic  Needle Size:  25 G  Guidance: ultrasound    Approach:  Volar  Condition: trigger finger    Location:  Thumb    Site:  R thumb A1  Medications:  6 mg betamethasone acet & sod phos 6 (3-3) MG/ML; 1 mL ROPivacaine 5 MG/ML  Outcome:  Tolerated well, no immediate complications  Procedure discussed: discussed risks, benefits, and alternatives    Consent Given by:  Patient  Timeout: timeout called immediately prior to procedure    Prep: patient was prepped and draped in usual sterile fashion     Ultrasound was used to ensure safe and accurate needle placement and injection. Ultrasound images of the procedure were permanently stored.                     Again, thank you for allowing me to participate in the care of your patient.        Sincerely,        Trenton Sr DO

## 2024-05-28 NOTE — PROGRESS NOTES
ASSESSMENT & PLAN    Angeli was seen today for follow up.    Diagnoses and all orders for this visit:    Flexor tenosynovitis of thumb  -     Hand / Upper Extremity Injection/Arthrocentesis: R thumb A1      This issue is acute on chronic and Unchanged.  Angeli presents to clinic today discuss her acute on chronic right thumb pain and stiffness.  Her previous radiographs were reviewed and shows some mild degenerative changes at the IP joint of the thumb.  On exam today the patient has stiffness of the right thumb with pain at the first MCP joint and reports feelings of triggering, although we are unable to provoke any triggering today.  She has previously had a corticosteroid injection to the A1 pulley of the thumb that provided 6 months of relief from her symptoms.  We discussed that in the treatment of trigger fingers usually a referral is made to the surgery team if the patient fails a trigger point injection, however given she has gotten at least 6 months of relief it is reasonable to repeat steroid injections today, the patient was amenable to repeating an injection rather than proceeding with surgical referral.  We determined the following plan:  - CSI to the right thumb A1 pulley performed today under ultrasound guidance, see procedure note for details  - She can otherwise continue to use over-the-counter pain medicines, ice, heat as needed  - She can follow-up in our clinic as needed.  If she gets less than 6 months of relief from this injection, would proceed with referral to the hand surgery team.        Trenton Sr, [facility] [place]    SUBJECTIVE- Interim History May 28, 2024    Chief Complaint   Patient presents with    Right Thumb - Follow Up       Angeli Jacobson is a 74 year old female who is seen in f/u up for right thumb pain. Since last visit on 10/11/23 with Saturnino Quinn patient states her pain has returned.  - Now ~ 9 months from initial  "onset    Worsened by: overuse of thumb, gripping her hurtado  Better with: cortisone injection, tylenol  Treatments tried: corticosteroid injection (most recent date: 10/11/23) that provided  6 month(s) of relief, voltaren, ice, tylenol  Associated symptoms:  swelling, stiffness    The patient is seen by themselves.  The patient is Right handed    Orthopedic/Surgical history: NO  Social History/Occupation: retired      REVIEW OF SYSTEMS:  Review of Systems    OBJECTIVE:  /84   Ht 1.626 m (5' 4\")   Wt 73.5 kg (162 lb)   LMP  (LMP Unknown)   BMI 27.81 kg/m     General: healthy, alert and in no distress  Skin: no suspicious lesions or rash.  CV: distal perfusion intact   Resp: normal respiratory effort without conversational dyspnea   Psych: normal mood and affect  Gait: NORMAL  Neuro: Normal light sensory exam of RU extremity     Hand Exam    Left  Right   Inspection No atrophy, erythema , echymosis, or deformity  No atrophy, erythema , echymosis, or deformity    Palpation         Tenderness over    No tenderness to palpation of radial styloid, DRUJ, TFCC, scaphoid/snuffbox TTP 1st MCP  No tenderness to palpation of radial styloid, DRUJ, TFCC, scaphoid/snuffbox   Range of Motion        1st digit IP flex/ext Normal Decreased extension      DIP flexion/extension  Normal 2nd-5th Normal 2nd-5th      PIP flexion/extension  Normal 2nd-5th Normal 2nd-5th      MCP flexion/extension Normal Normal   Strength      1st digit IP flex/ext Full Full, painful    DIP flexion/extension Full Full    PIP flexion/extension  Full Full    MCP flexion/extension Full Full   Pain with resisted None None   Vascular   Intact  Intact    Special Tests   1st MCP valgus 0/30 normal   Neg CMC grind  No triggering evident  Able to make 'OK' sign (AIN)  Intact resisted abduction of digits    Sensation Intact to median, ulnar, and radial nerve distributions         RADIOLOGY:  Final results and radiologist's interpretation, available in the Epic " health record.  Images were reviewed with the patient in the office today.  My personal interpretation of the performed imaging: Radiographs from 10/2/2023 were reviewed and show mild narrowing of the thumb IP joint.  Otherwise well-maintained joint spaces.    Hand / Upper Extremity Injection/Arthrocentesis: R thumb A1    Date/Time: 5/28/2024 9:04 AM    Performed by: Trenton Sr DO  Authorized by: Trenton Sr DO    Indications:  Pain, tendon swelling and therapeutic  Needle Size:  25 G  Guidance: ultrasound    Approach:  Volar  Condition: trigger finger    Location:  Thumb    Site:  R thumb A1  Medications:  6 mg betamethasone acet & sod phos 6 (3-3) MG/ML; 1 mL ROPivacaine 5 MG/ML  Outcome:  Tolerated well, no immediate complications  Procedure discussed: discussed risks, benefits, and alternatives    Consent Given by:  Patient  Timeout: timeout called immediately prior to procedure    Prep: patient was prepped and draped in usual sterile fashion     Ultrasound was used to ensure safe and accurate needle placement and injection. Ultrasound images of the procedure were permanently stored.

## 2024-05-31 ENCOUNTER — MYC REFILL (OUTPATIENT)
Dept: CARDIOLOGY | Facility: CLINIC | Age: 74
End: 2024-05-31
Payer: COMMERCIAL

## 2024-05-31 DIAGNOSIS — E78.5 HYPERLIPIDEMIA LDL GOAL <70: ICD-10-CM

## 2024-06-10 ENCOUNTER — THERAPY VISIT (OUTPATIENT)
Dept: PHYSICAL THERAPY | Facility: CLINIC | Age: 74
End: 2024-06-10
Payer: COMMERCIAL

## 2024-06-10 DIAGNOSIS — M25.60 DECREASED RANGE OF MOTION: ICD-10-CM

## 2024-06-10 DIAGNOSIS — I89.0 LYMPHEDEMA OF LEFT LEG: Primary | ICD-10-CM

## 2024-06-10 DIAGNOSIS — Z17.0 MALIGNANT NEOPLASM OF UPPER-OUTER QUADRANT OF RIGHT BREAST IN FEMALE, ESTROGEN RECEPTOR POSITIVE (H): ICD-10-CM

## 2024-06-10 DIAGNOSIS — L90.5 SCAR CONDITION AND FIBROSIS OF SKIN: ICD-10-CM

## 2024-06-10 DIAGNOSIS — R53.1 DECREASED STRENGTH: ICD-10-CM

## 2024-06-10 DIAGNOSIS — C50.411 MALIGNANT NEOPLASM OF UPPER-OUTER QUADRANT OF RIGHT BREAST IN FEMALE, ESTROGEN RECEPTOR POSITIVE (H): ICD-10-CM

## 2024-06-10 DIAGNOSIS — Z85.831 HISTORY OF SARCOMA OF SOFT TISSUE: ICD-10-CM

## 2024-06-10 PROCEDURE — 97110 THERAPEUTIC EXERCISES: CPT | Mod: GP | Performed by: PHYSICAL THERAPIST

## 2024-06-10 PROCEDURE — 97161 PT EVAL LOW COMPLEX 20 MIN: CPT | Mod: GP | Performed by: PHYSICAL THERAPIST

## 2024-06-10 PROCEDURE — 97140 MANUAL THERAPY 1/> REGIONS: CPT | Mod: GP | Performed by: PHYSICAL THERAPIST

## 2024-06-10 NOTE — PROGRESS NOTES
PHYSICAL THERAPY EVALUATION  Type of Visit: Evaluation    See electronic medical record for Abuse and Falls Screening details.    Subjective       Presenting condition or subjective complaint:  exacerbation of L LE following 3 week [place].    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   Dates & types of surgery:  2018 - 2023 7 surgeries in left thigh - Vascular, left hip, left knee. Right Breast surgery to remove small nodule, followed by 3 week course of radiation (Dec '22 - March '23     Prior diagnostic imaging/testing results:       Prior therapy history for the same diagnosis, illness or injury:    yes; seen 3/18/2024 for eval and 1 treatment only to optimize home program    Prior Level of Function  Transfers: Independent  Ambulation: Independent  ADL: Independent  IADL:  independent  EXERCISE: prior to cruise: performing stretching strengthening daily; trying to get up and walk every hour during day with 1 extended extened walk ie x 15';owns Nustep but stating L knee limited flexion limits abiltiy to use      Living Environment  Social support:   With a significant other or spouse  Chris   Type of home:   2 story house  Stairs to enter the home:       no  Ramp:     Stairs inside the home:       yes, 1  Help at home:    Equipment owned:   Four-point cane; Grab bars uses cane in more crowded events or with longer walks     Employment:    retired  Hobbies/Interests:   traveling; word puzzles; cooking-baking; grandchildren; [employer] group     Patient goals for therapy:  reduce L LE edema and optimize home program    Pain assessment:  stiffness  L knee     Objective       EDEMA EVALUATION  Additional history:  Body part affected by edema:  L LE  If cancer related, treatment:  s/p sarcoma  If not cancer related, problems with veins or cause of swelling:    Distance able to walk:  using cane x 5-6 blocks; 30'; decreased pace  Sensation problems in hands/feet:    decreased L foot  Edema etiology: Cancer with lymph node dissection; chemo, radiation, surgery, GISSELL    Cognitive Status Examination  Orientation: Oriented to person, place and time   Level of Consciousness: Alert  Follows Commands and Answers Questions: 100% of the time  Personal Safety and Judgement: Intact    EDEMA  Skin Condition:  Lipodermatosclerosis, Non-pitting, Pitting, mild errthyma of L anterior mid shin ; significant rubbery fibrosis throughout L LE especially L calf and knee; 2+ pretibial pitting L LE; nonpitting puffy edema L lower quadrant  Scar:   Yes; anteiror L knee extending proxmially as well as medial L knee and thigh and signficant adhenace of L proxiaml medial thigh  Capillary Refill: Symmetrical  Stemmer Sign: + L only  Ulceration: No    GIRTH MEASUREMENTS: Refer to separate girth measurement flowsheet.     VOLUME LE  Left LE (mL) To 40cm: 3076ml (increased 8.5% from 3/18/2024)   Right LE (mL) 2086ml   LE Volume Comparison LLE volume greater than RLE volume   % Difference 48%   Head/Neck Volume     Trunk Volume    Genital Volume       RANGE OF MOTION:  WFL except L knee flexion limited 0-90  STRENGTH:  WFL; decreased L hip and L knee fleixion  BED MOBILITY:  sleeping in regular bed  TRANSFERS: Independent; use of UE support  GAIT/LOCOMOTION: decreased L knee flexion during swing; decreased pace  SENSATION:  decreased light touch dorsal L foot and toes  VASCULAR:  stable L thigh arterial  stent    Assessment & Plan   CLINICAL IMPRESSIONS  Medical Diagnosis: Lymphedema L LE; h/o sarcoma of soft tissue    Treatment Diagnosis: lymphedema, fibrosis, decreased endurance, antalgic gait, decreased strength, balance   Impression/Assessment: Patient is a 74 year old female with exacerbation of L LE lymphedema  complaints.  The following significant findings have been identified: Pain, Decreased ROM/flexibility, Decreased strength, Impaired balance, Impaired sensation, Edema, Impaired gait, Impaired muscle performance, and Decreased activity tolerance. These impairments interfere with their ability to perform self care tasks, recreational activities, household chores, and community mobility as compared to previous level of function.     Clinical Decision Making (Complexity):  Clinical Presentation: Evolving/Changing  Clinical Presentation Rationale: based on medical and personal factors listed in PT evaluation  Clinical Decision Making (Complexity): Moderate complexity    PLAN OF CARE  Treatment Interventions:  Interventions: Gait Training, Manual Therapy, Neuromuscular Re-education, Therapeutic Exercise    Long Term Goals     PT Goal 1  Goal Identifier: Home program  Goal Description: Patient will be independent in resuming prior  home program to manage conditions of lymphedema and fibrosis.  Rationale: to maximize safety and independence with performance of ADLs and functional tasks;to maximize safety and independence within the home;to maximize safety and independence within the community;to maximize safety and independence with self cares  Target Date: 09/07/24  PT Goal 2  Goal Identifier: volume L LE  Goal Description: Patient to demonstrate decreased volume of L LE > 5% nearing baseline from 3/18/2024 to decrease risk of infection  Rationale: to maximize safety and independence with performance of ADLs and functional tasks;to maximize safety and independence with self cares  Goal Progress: eval: L LE to  40cm: 3076ml (increased 8.5% from 3/18/2024; L>R= 48%  Target Date: 08/09/24      Frequency of Treatment: 1x/ week until able to attend 4x/week x 2 weeks then resume 2x/month  Duration of Treatment: 90 days      Education Assessment:   Learner/Method: Patient  Education Comments: updates to HEP    Risks and benefits of evaluation/treatment have been explained.   Patient/Family/caregiver agrees with Plan of Care.     Evaluation Time:     PT Eval, Low Complexity Minutes (08826): 30     Signing Clinician: MISAEL Whitley [facility]                                                                                   OUTPATIENT PHYSICAL THERAPY      PLAN OF TREATMENT FOR OUTPATIENT REHABILITATION   Patient's Last Name, First Name, TURNERJinPARTHJin  Brooke Jacobsonnydia ALAB YOB: 1950   Provider's Name   [facility]   Medical Record No.  [id number]     Onset Date: 05/01/24 (exacerbation of L LE edema following 3 week [place])  Start of Care Date: 06/10/24     Medical Diagnosis:  Lymphedema L LE; h/o sarcoma of soft tissue      PT Treatment Diagnosis:  lymphedema, fibrosis, decreased endurance, antalgic gait, decreased strength, balance Plan of Treatment  Frequency/Duration: 1x/ week until able to attend 4x/week x 2 weeks then resume 2x/month/ 90 days    Certification date from 06/10/24 to 09/07/24         See note for plan of treatment details and functional goals     Mariana Guzman, PT                         I CERTIFY THE NEED FOR THESE SERVICES FURNISHED UNDER        THIS PLAN OF TREATMENT AND WHILE UNDER MY CARE     (Physician attestation of this document indicates review and certification of the therapy plan).              Referring Provider:  Leeann Sharma    Initial Assessment  See Epic Evaluation- Start of Care Date: 06/10/24

## 2024-06-13 NOTE — TELEPHONE ENCOUNTER
Follow up to 3/11/22      BLE Arteiral US    ADRIAN with exercise & toe pressures    Results will be communicated via MyChart or telephone.    Barbara Greenwood RN BSN  [facility]  [phone]           alert , in no acute distress , well developed, well nourished

## 2024-06-28 ASSESSMENT — SLEEP AND FATIGUE QUESTIONNAIRES
HOW LIKELY ARE YOU TO NOD OFF OR FALL ASLEEP WHILE LYING DOWN TO REST IN THE AFTERNOON WHEN CIRCUMSTANCES PERMIT: HIGH CHANCE OF DOZING
HOW LIKELY ARE YOU TO NOD OFF OR FALL ASLEEP WHILE WATCHING TV: MODERATE CHANCE OF DOZING
HOW LIKELY ARE YOU TO NOD OFF OR FALL ASLEEP WHILE SITTING AND READING: HIGH CHANCE OF DOZING
HOW LIKELY ARE YOU TO NOD OFF OR FALL ASLEEP WHEN YOU ARE A PASSENGER IN A CAR FOR AN HOUR WITHOUT A BREAK: HIGH CHANCE OF DOZING
HOW LIKELY ARE YOU TO NOD OFF OR FALL ASLEEP WHILE SITTING QUIETLY AFTER LUNCH WITHOUT ALCOHOL: SLIGHT CHANCE OF DOZING
HOW LIKELY ARE YOU TO NOD OFF OR FALL ASLEEP WHILE SITTING AND TALKING TO SOMEONE: WOULD NEVER DOZE
HOW LIKELY ARE YOU TO NOD OFF OR FALL ASLEEP IN A CAR, WHILE STOPPED FOR A FEW MINUTES IN TRAFFIC: WOULD NEVER DOZE
HOW LIKELY ARE YOU TO NOD OFF OR FALL ASLEEP WHILE SITTING INACTIVE IN A PUBLIC PLACE: SLIGHT CHANCE OF DOZING

## 2024-07-01 ENCOUNTER — OFFICE VISIT (OUTPATIENT)
Dept: PEDIATRICS | Facility: CLINIC | Age: 74
End: 2024-07-01
Payer: COMMERCIAL

## 2024-07-01 ENCOUNTER — ANCILLARY PROCEDURE (OUTPATIENT)
Dept: GENERAL RADIOLOGY | Facility: CLINIC | Age: 74
End: 2024-07-01
Attending: INTERNAL MEDICINE
Payer: COMMERCIAL

## 2024-07-01 VITALS
RESPIRATION RATE: 16 BRPM | TEMPERATURE: 97.5 F | OXYGEN SATURATION: 96 % | BODY MASS INDEX: 26.69 KG/M2 | HEART RATE: 67 BPM | HEIGHT: 65 IN | SYSTOLIC BLOOD PRESSURE: 112 MMHG | WEIGHT: 160.2 LBS | DIASTOLIC BLOOD PRESSURE: 67 MMHG

## 2024-07-01 DIAGNOSIS — M25.532 LEFT WRIST PAIN: Primary | ICD-10-CM

## 2024-07-01 DIAGNOSIS — M25.532 LEFT WRIST PAIN: ICD-10-CM

## 2024-07-01 PROCEDURE — 99213 OFFICE O/P EST LOW 20 MIN: CPT | Performed by: INTERNAL MEDICINE

## 2024-07-01 PROCEDURE — 73110 X-RAY EXAM OF WRIST: CPT | Mod: TC | Performed by: RADIOLOGY

## 2024-07-01 RX ORDER — RESPIRATORY SYNCYTIAL VIRUS VACCINE 120MCG/0.5
0.5 KIT INTRAMUSCULAR ONCE
Qty: 1 EACH | Refills: 0 | Status: CANCELLED | OUTPATIENT
Start: 2024-07-01 | End: 2024-07-01

## 2024-07-01 ASSESSMENT — PAIN SCALES - GENERAL: PAINLEVEL: EXTREME PAIN (8)

## 2024-07-01 NOTE — PROGRESS NOTES
"  Assessment & Plan     Left wrist pain  Likely strain. No fall or significant trauma. XR doesn't show evidence of fracture. Minimal soft tissue swelling over distal ulna. No bony tenderness. Plan to have her wear wrist brace as much as possible for 4 weeks. At this point, with 3 weeks of symptoms, would be less utility to icing. If not better, will let me know and I'll do a rx for referral to ortho. Reviewed handouts. Could consider doing wrist exercises, but would wait until pain has resolved.  - XR Wrist Left G/E 3 Views; Future  - Wrist/Arm/Hand Bracking Supplies Order Wrist Brace; Left; non-thumb spica      BMI  Estimated body mass index is 27.07 kg/m  as calculated from the following:    Height as of this encounter: 1.638 m (5' 4.5\").    Weight as of this encounter: 72.7 kg (160 lb 3.2 oz).       See Patient Instructions    Subjective   Angeli is a 74 year old, presenting for the following health issues:  Pain (Left Wrist x 3 weeks, certain motion would cause pain. Noticed some swelling, has used Voltaren topical gel )        7/1/2024     8:41 AM   Additional Questions   Roomed by RANJITH Nieves   Accompanied by n/a         7/1/2024     8:41 AM   Patient Reported Additional Medications   Patient reports taking the following new medications n/a     Via the Health Maintenance questionnaire, the patient has reported the following services have been completed -Eye Exam: [facility] 2024-05-15, this information has been sent to the abstraction team.  History of Present Illness       Reason for visit:  Determine cause for left wrist pain  Symptom onset:  3-4 weeks ago  Symptoms include:  Pain in left wrist with motion  Symptom intensity:  Moderate  Symptom progression:  Staying the same  Had these symptoms before:  No  What makes it worse:  Pain when certain motion is made    She eats 4 or more servings of fruits and vegetables daily.She consumes 0 sweetened beverage(s) daily.She exercises with enough " "effort to increase her heart rate 10 to 19 minutes per day.  She exercises with enough effort to increase her heart rate 3 or less days per week.   She is taking medications regularly.     3 weeks ago noticed pain. Happened when she was doing something, but thinks she was moving or lifting something. Wasn't sharp. But later she noticed that certain movements were very painful. Notices with moving hand laterally. Same. Doesn't bother her at night, but is careful not to disturb it.     At first was using voltaren gel, didn't seem to help.         Review of Systems  Constitutional, cardiovascular, pulmonary, gi systems are negative, except as otherwise noted.      Objective    /67   Pulse 67   Temp 97.5  F (36.4  C) (Oral)   Resp 16   Ht 1.638 m (5' 4.5\")   Wt 72.7 kg (160 lb 3.2 oz)   LMP  (LMP Unknown)   SpO2 96%   Breastfeeding No   BMI 27.07 kg/m    Body mass index is 27.07 kg/m .  Physical Exam   GENERAL: alert and no distress  MS: LUE exam shows no bony tenderness, full ROM wrist, but abduction at wrist causes discomfort.  5/5.     Xray - Reviewed and interpreted by me.  No fractures. Minimal soft tissue swelling.        Signed Electronically by: Barbara Kern MD    "

## 2024-07-01 NOTE — PATIENT INSTRUCTIONS
Wear wrist splint as much as possible for the next 4 weeks. If still having pain, I'd like you to see orthopedics.  You can start wrist exercises when your pain has resolved. If you feel fine and are normally active with your hands and wrists, you may choose not to do the exercises.

## 2024-07-02 ENCOUNTER — THERAPY VISIT (OUTPATIENT)
Dept: PHYSICAL THERAPY | Facility: CLINIC | Age: 74
End: 2024-07-02
Payer: COMMERCIAL

## 2024-07-02 DIAGNOSIS — L90.5 SCAR CONDITION AND FIBROSIS OF SKIN: ICD-10-CM

## 2024-07-02 DIAGNOSIS — Z85.831 HISTORY OF SARCOMA OF SOFT TISSUE: ICD-10-CM

## 2024-07-02 DIAGNOSIS — R53.1 DECREASED STRENGTH: ICD-10-CM

## 2024-07-02 DIAGNOSIS — Z17.0 MALIGNANT NEOPLASM OF UPPER-OUTER QUADRANT OF RIGHT BREAST IN FEMALE, ESTROGEN RECEPTOR POSITIVE (H): ICD-10-CM

## 2024-07-02 DIAGNOSIS — C50.411 MALIGNANT NEOPLASM OF UPPER-OUTER QUADRANT OF RIGHT BREAST IN FEMALE, ESTROGEN RECEPTOR POSITIVE (H): ICD-10-CM

## 2024-07-02 DIAGNOSIS — M25.60 DECREASED RANGE OF MOTION: ICD-10-CM

## 2024-07-02 DIAGNOSIS — I89.0 LYMPHEDEMA OF LEFT LEG: Primary | ICD-10-CM

## 2024-07-02 PROCEDURE — 97140 MANUAL THERAPY 1/> REGIONS: CPT | Mod: GP | Performed by: PHYSICAL THERAPIST

## 2024-07-02 PROCEDURE — 97110 THERAPEUTIC EXERCISES: CPT | Mod: GP | Performed by: PHYSICAL THERAPIST

## 2024-07-03 ENCOUNTER — OFFICE VISIT (OUTPATIENT)
Dept: SLEEP MEDICINE | Facility: CLINIC | Age: 74
End: 2024-07-03
Payer: COMMERCIAL

## 2024-07-03 VITALS
HEIGHT: 65 IN | DIASTOLIC BLOOD PRESSURE: 76 MMHG | HEART RATE: 67 BPM | OXYGEN SATURATION: 95 % | WEIGHT: 158 LBS | SYSTOLIC BLOOD PRESSURE: 114 MMHG | BODY MASS INDEX: 26.33 KG/M2

## 2024-07-03 DIAGNOSIS — G47.19 EXCESSIVE DAYTIME SLEEPINESS: ICD-10-CM

## 2024-07-03 DIAGNOSIS — G47.33 OSA (OBSTRUCTIVE SLEEP APNEA): Primary | ICD-10-CM

## 2024-07-03 PROCEDURE — 99214 OFFICE O/P EST MOD 30 MIN: CPT | Performed by: PHYSICIAN ASSISTANT

## 2024-07-03 NOTE — NURSING NOTE
"Chief Complaint   Patient presents with    Study Results     Follow up HST results       Initial /76   Pulse 67   Ht 1.638 m (5' 4.5\")   Wt 71.7 kg (158 lb)   LMP  (LMP Unknown)   SpO2 95%   BMI 26.70 kg/m   Estimated body mass index is 26.7 kg/m  as calculated from the following:    Height as of this encounter: 1.638 m (5' 4.5\").    Weight as of this encounter: 71.7 kg (158 lb).    Medication Reconciliation: complete  ESS 13  ASHLEY 10  Jennifer Wagoner MA      "

## 2024-07-03 NOTE — PROGRESS NOTES
[facility]   Outpatient Sleep Medicine  Jul 3, 2024       Name: Angeli Jacobson MRN# [id number]   Age: 74 year old YOB: 1950            Assessment and Plan:   1. ALEXANDER (obstructive sleep apnea)  2. Excessive daytime sleepiness    During this visit, we reviewed the summary of the study including position, event distribution, oximetry and heart rate. Mild obstructive sleep apnea was identified with AHI 8.9 events per hour. Events were supine predominant with supine AHI 25.7 and lateral AHI ~5. There was associated mild hypoxemia with oxygen adi 74% and 9.4 minutes spent less than or equal to 88%.    Results of sleep study were communicated to patient via [employer] message back in May.  Returns to clinic today to discuss treatment options in detail. Reviewed treatment options including CPAP, mandibular advancement device, positional restriction, and lastly consideration of surgical options. Also discussed option of no treatment as this degree of ALEXANDER not been proved to place patient at risk of increased long term cardiovascular morbidity/mortality.     She is interested in starting treatment of ALEXANDER with PAP therapy. Orders placed to start autoCPAP [id number]. Will be enrolled in [facility]. Reviewed importance of nightly therapy for effective treatment of ALEXANDER, and she voiced understanding and agreement.  We also reviewed importance of using PAP during the entire sleep duration to achieve maximal benefits, but reviewed that a minimum of 4 hours per night was required.  She is confident that she can achieve these goals and is willing to start therapy as soon as possible.    She will be seen back approximately 2 months after starting PAP therapy to ensure compliance and review treatment outcomes.  However, if she develops any difficulties with treatment she is instructed to contact us or [employer] company immediately to troubleshoot problems.           Chief Complaint      Chief Complaint   Patient  "presents with    Study Results     Follow up HST results          History of Present Illness:   Angeli Jacobson is a 74 year old female who presents to the clinic today with her  for results of recent sleep study completed on 5/2/2024. Study was completed to evaluate for ALEXANDER.     Reviewed results of sleep study with patient as follows:  HOME SLEEP STUDY INTERPRETATION  Patient: Angeli Jacobson  MRN: [id number]  YOB: 1950  Study Date: 5/2/2024  PCP/Referring Provider: Edel Mccarty;   Ordering Provider: Charley Martinez PA-C     Indications for Home Study: Angeli Jacobson is a 74 year old female who presents with symptoms suggestive of obstructive sleep apnea.     Estimated body mass index is 27.48 kg/m  as calculated from the following:    Height as of 5/3/24: 1.638 m (5' 4.5\").    Weight as of 5/3/24: 73.8 kg (162 lb 9.6 oz).  Total score - [place]: 16 (5/1/2024  3:16 PM)     Data: A full night home sleep study was performed recording the standard physiologic parameters including body position, movement, sound, nasal pressure, thermal oral airflow, chest and abdominal movements with respiratory inductance plethysmography, and oxygen saturation by pulse oximetry. Pulse rate was estimated by oximetry recording. This study was considered adequate based on > 4 hours of quality oximetry and respiratory recording. As specified by the AASM Manual for the Scoring of Sleep and Associated events, version 2.3, Rule VIII.D 1B, 4% oxygen desaturation scoring for hypopneas is used as a standard of care on all home sleep apnea testing.      Analysis Time:  567.7 minutes     Respiration:   Sleep Associated Hypoxemia: sustained hypoxemia was present. Baseline oxygen saturation was 91%.  Time with saturation less than or equal to 88% was 9.4 minutes. The lowest oxygen saturation was 74%.   Snoring: Snoring was present.  Respiratory events: The home study revealed a presence of 14 " "obstructive apneas and 9 mixed and central apneas. There were 61 hypopneas resulting in a combined apnea/hypopnea index [AHI] of 8.9 events per hour.  AHI was 25.7 per hour supine, N/A per hour prone, 4.8 per hour on left side, and 5.2 per hour on right side.   Pattern: Excluding events noted above, respiratory rate and pattern was Normal.     Position: Percent of time spent: supine - 19.4%, prone - 0%, on left - 72.5%, on right - 8.2%.     Heart Rate: By pulse oximetry normal rate was noted.     Past medical/surgical history, family history, social history, medications and allergies were reviewed.           Physical Examination:   /76   Pulse 67   Ht 1.638 m (5' 4.5\")   Wt 71.7 kg (158 lb)   LMP  (LMP Unknown)   SpO2 95%   BMI 26.70 kg/m    General appearance: Awake, alert, cooperative. Well groomed. Sitting comfortably in chair. In no apparent distress.  HEENT: Head: Normocephalic, atraumatic. Eyes:Conjunctiva clear. Sclera normal. Nose: External appearance without deformity.   Pulmonary:  Able to speak easily in full sentences. No cough or wheeze.   Skin:  No rashes or significant lesions on visible skin.   Neurologic: Alert, oriented x3.   Psychiatric: Mood euthymic. Affect congruent with full range and intensity.      CC:  Edel Mccarty PA-C  Jul 3, 2024     [facility]  [address] [place] [address]     [facility]  [address] [address]    Chart documentation was completed, in part, with [employer] voice-recognition software. Even though reviewed, some grammatical, spelling, and word errors may remain.    31 minutes spent on day of encounter doing chart review, history and exam, documentation, and further activities as noted above    "

## 2024-07-08 ENCOUNTER — LAB (OUTPATIENT)
Dept: LAB | Facility: CLINIC | Age: 74
End: 2024-07-08
Payer: COMMERCIAL

## 2024-07-08 DIAGNOSIS — R73.01 IMPAIRED FASTING GLUCOSE: Primary | ICD-10-CM

## 2024-07-08 DIAGNOSIS — E11.9 DIABETES MELLITUS (H): ICD-10-CM

## 2024-07-08 DIAGNOSIS — E21.3 HYPERPARATHYROIDISM (H): ICD-10-CM

## 2024-07-08 LAB
ALBUMIN SERPL BCG-MCNC: 4.3 G/DL (ref 3.5–5.2)
ANION GAP SERPL CALCULATED.3IONS-SCNC: 8 MMOL/L (ref 7–15)
BUN SERPL-MCNC: 12.9 MG/DL (ref 8–23)
CALCIUM SERPL-MCNC: 9.5 MG/DL (ref 8.8–10.2)
CHLORIDE SERPL-SCNC: 109 MMOL/L (ref 98–107)
CREAT SERPL-MCNC: 0.64 MG/DL (ref 0.51–0.95)
DEPRECATED HCO3 PLAS-SCNC: 24 MMOL/L (ref 22–29)
EGFRCR SERPLBLD CKD-EPI 2021: >90 ML/MIN/1.73M2
GLUCOSE SERPL-MCNC: 93 MG/DL (ref 70–99)
HBA1C MFR BLD: 6 % (ref 0–5.6)
HOLD SPECIMEN: NORMAL
PHOSPHATE SERPL-MCNC: 2.7 MG/DL (ref 2.5–4.5)
POTASSIUM SERPL-SCNC: 4.5 MMOL/L (ref 3.4–5.3)
PTH-INTACT SERPL-MCNC: 100 PG/ML (ref 15–65)
SODIUM SERPL-SCNC: 141 MMOL/L (ref 135–145)
VIT D+METAB SERPL-MCNC: 43 NG/ML (ref 20–50)

## 2024-07-08 PROCEDURE — 83970 ASSAY OF PARATHORMONE: CPT

## 2024-07-08 PROCEDURE — 82306 VITAMIN D 25 HYDROXY: CPT

## 2024-07-08 PROCEDURE — 80069 RENAL FUNCTION PANEL: CPT

## 2024-07-08 PROCEDURE — 36415 COLL VENOUS BLD VENIPUNCTURE: CPT

## 2024-07-08 PROCEDURE — 83036 HEMOGLOBIN GLYCOSYLATED A1C: CPT

## 2024-07-09 ENCOUNTER — THERAPY VISIT (OUTPATIENT)
Dept: PHYSICAL THERAPY | Facility: CLINIC | Age: 74
End: 2024-07-09
Payer: COMMERCIAL

## 2024-07-09 DIAGNOSIS — Z17.0 MALIGNANT NEOPLASM OF UPPER-OUTER QUADRANT OF RIGHT BREAST IN FEMALE, ESTROGEN RECEPTOR POSITIVE (H): ICD-10-CM

## 2024-07-09 DIAGNOSIS — L90.5 SCAR CONDITION AND FIBROSIS OF SKIN: ICD-10-CM

## 2024-07-09 DIAGNOSIS — M25.60 DECREASED RANGE OF MOTION: ICD-10-CM

## 2024-07-09 DIAGNOSIS — Z85.831 HISTORY OF SARCOMA OF SOFT TISSUE: ICD-10-CM

## 2024-07-09 DIAGNOSIS — C50.411 MALIGNANT NEOPLASM OF UPPER-OUTER QUADRANT OF RIGHT BREAST IN FEMALE, ESTROGEN RECEPTOR POSITIVE (H): ICD-10-CM

## 2024-07-09 DIAGNOSIS — I89.0 LYMPHEDEMA OF LEFT LEG: Primary | ICD-10-CM

## 2024-07-09 DIAGNOSIS — R53.1 DECREASED STRENGTH: ICD-10-CM

## 2024-07-09 PROCEDURE — 97110 THERAPEUTIC EXERCISES: CPT | Mod: GP | Performed by: PHYSICAL THERAPIST

## 2024-07-09 PROCEDURE — 97140 MANUAL THERAPY 1/> REGIONS: CPT | Mod: GP | Performed by: PHYSICAL THERAPIST

## 2024-07-10 PROCEDURE — 81050 URINALYSIS VOLUME MEASURE: CPT

## 2024-07-10 PROCEDURE — 82570 ASSAY OF URINE CREATININE: CPT

## 2024-07-10 PROCEDURE — 82340 ASSAY OF CALCIUM IN URINE: CPT

## 2024-07-11 ENCOUNTER — HOSPITAL ENCOUNTER (OUTPATIENT)
Dept: NUCLEAR MEDICINE | Facility: CLINIC | Age: 74
Setting detail: NUCLEAR MEDICINE
Discharge: HOME | End: 2024-07-11
Attending: INTERNAL MEDICINE
Payer: COMMERCIAL

## 2024-07-11 ENCOUNTER — HOSPITAL ENCOUNTER (OUTPATIENT)
Dept: ULTRASOUND IMAGING | Facility: CLINIC | Age: 74
Discharge: HOME | End: 2024-07-11
Attending: INTERNAL MEDICINE
Payer: COMMERCIAL

## 2024-07-11 DIAGNOSIS — E21.3 HYPERPARATHYROIDISM (H): ICD-10-CM

## 2024-07-11 LAB
CALCIUM 24H UR-MRATE: 0.24 G/SPEC (ref 0.1–0.3)
CALCIUM UR-MCNC: 12.8 MG/DL
COLLECT DURATION TIME UR: 24 H
COLLECT DURATION TIME UR: 24 H
CREAT 24H UR-MRATE: 0.75 G/SPEC (ref 0.72–1.51)
CREAT UR-MCNC: 40.3 MG/DL
SPECIMEN VOL UR: 1850 ML
SPECIMEN VOL UR: 1850 ML

## 2024-07-11 PROCEDURE — A9500 TC99M SESTAMIBI: HCPCS | Performed by: INTERNAL MEDICINE

## 2024-07-11 PROCEDURE — 78072 PARATHYRD PLANAR W/SPECT&CT: CPT

## 2024-07-11 PROCEDURE — 343N000001 HC RX 343: Performed by: INTERNAL MEDICINE

## 2024-07-11 PROCEDURE — 76536 US EXAM OF HEAD AND NECK: CPT

## 2024-07-11 RX ADMIN — KIT FOR THE PREPARATION OF TECHNETIUM TC99M SESTAMIBI 26.9 MILLICURIE: 1 INJECTION, POWDER, LYOPHILIZED, FOR SOLUTION PARENTERAL at 10:47

## 2024-07-19 ENCOUNTER — DOCUMENTATION ONLY (OUTPATIENT)
Dept: SLEEP MEDICINE | Facility: CLINIC | Age: 74
End: 2024-07-19
Payer: COMMERCIAL

## 2024-07-19 ENCOUNTER — TELEPHONE (OUTPATIENT)
Dept: SLEEP MEDICINE | Facility: CLINIC | Age: 74
End: 2024-07-19

## 2024-07-19 DIAGNOSIS — G47.33 OSA (OBSTRUCTIVE SLEEP APNEA): Primary | ICD-10-CM

## 2024-07-19 SDOH — HEALTH STABILITY: PHYSICAL HEALTH: ON AVERAGE, HOW MANY MINUTES DO YOU ENGAGE IN EXERCISE AT THIS LEVEL?: 0 MIN

## 2024-07-19 SDOH — HEALTH STABILITY: PHYSICAL HEALTH: ON AVERAGE, HOW MANY DAYS PER WEEK DO YOU ENGAGE IN MODERATE TO STRENUOUS EXERCISE (LIKE A BRISK WALK)?: 0 DAYS

## 2024-07-19 ASSESSMENT — SOCIAL DETERMINANTS OF HEALTH (SDOH): HOW OFTEN DO YOU GET TOGETHER WITH FRIENDS OR RELATIVES?: ONCE A WEEK

## 2024-07-19 NOTE — PROGRESS NOTES
[facility], [place]    Hematology/Oncology Established Patient Note      Today's Date: 7/24/2024    Reason for follow-up:  Right breast cancer.    HISTORY OF PRESENT ILLNESS: Angeli Jacobson is a 74 year old female with history of left thigh liposarcoma status post excision in 2000 with subsequent left leg lymphedema and peripheral arterial disease status post redo left common femoral to popliteal artery bypass in January 2019, who presents with the following oncologic history:  1.  11/7/2022: Screening mammogram showed asymmetry in the right breast at 12:00, retroareolar far posterior.  Left breast negative.  2.  11/15/2022: Diagnostic right mammogram showed asymmetry in the posterior right breast, 8 cm from nipple.  Ultrasound of right breast at 12:00, 2 cm from nipple showed a small benign cyst but no definite sonographic correlate for the mammographic asymmetry.  3.  11/16/2022: Stereotactic guided right breast needle biopsy of 8 mm lesion at 3:00, 8 cm from the nipple showed grade 2 invasive ductal carcinoma, ER positive at %, IN positive at 60-70%, HER2 IHC = 2+ equivocal, HER2/compa FISH negative.  4. 12/14/2022: Underwent right breast lumpectomy with right axillary sentinel lymph node excision with Dr. Shelly Carr.  Pathology showed no residual invasive malignancy; 2 microscopic foci of DCIS, grade 2, largest measuring 1.2 mm; margins negative; total 2 right axillary lymph nodes negative.  5. 3/21/2023: Completed adjuvant radiation therapy to the right breast.   6. 4/10/2023: Started anastrozole. Discontinued 6/19/2023 due to weakness, joint stiffness, and brain fog.  7. 8/20/2023: Switched to letrozole.    INTERVAL HISTORY:  Angeli reports feeling well.      REVIEW OF SYSTEMS:   14 point ROS was reviewed and is negative other than as noted above in HPI.       HOME MEDICATIONS:  Current Outpatient Medications   Medication Sig Dispense Refill    calcium carbonate 600 mg-vitamin D  400 units (CALTRATE) 600-400 MG-UNIT per tablet Take 1 chew tab by mouth daily      cefdinir (OMNICEF) 300 MG capsule Take 1 capsule (300 mg) by mouth 2 times daily 20 capsule 0    clopidogrel (PLAVIX) 75 MG tablet Take 1 tablet (75 mg) by mouth daily 90 tablet 3    denosumab (PROLIA) 60 MG/ML SOSY injection       evolocumab (REPATHA) 140 MG/ML prefilled autoinjector Inject 1 mL (140 mg) Subcutaneous every 14 days 6 mL 2    ezetimibe (ZETIA) 10 MG tablet Take 1 tablet (10 mg) by mouth daily 90 tablet 3    letrozole (FEMARA) 2.5 MG tablet Take 1 tablet (2.5 mg) by mouth daily 90 tablet 3    levothyroxine (SYNTHROID/LEVOTHROID) 75 MCG tablet Take 1 tablet (75 mcg) by mouth daily 90 tablet 3    magnesium oxide 200 MG TABS       multivitamin, therapeutic (THERA-VIT) TABS tablet Take 1 tablet by mouth daily      nitroFURantoin macrocrystal-monohydrate (MACROBID) 100 MG capsule Take 1 capsule after intercourse 30 capsule 0    rivaroxaban ANTICOAGULANT (XARELTO) 2.5 MG TABS tablet Take 1 tablet (2.5 mg) by mouth 2 times daily 180 tablet 3    rosuvastatin (CRESTOR) 20 MG tablet Take 1 tablet (20 mg) by mouth daily 90 tablet 3    Sharps Container MISC Use as per  instructions for safe needle disposal 1 each 1    solifenacin (VESICARE) 5 MG tablet Take 1 tablet (5 mg) by mouth daily 90 tablet 1         ALLERGIES:  Allergies   Allergen Reactions    Celexa [Citalopram Hydrobromide]      Decreased libido         PAST MEDICAL HISTORY:  Past Medical History:   Diagnosis Date    * * * SBE PROPHYLAXIS * * * 1998    Amox 500mg, take 4 tabs one hour prior to procedure.Takes this because of lymphedema secondary from leg surgey    Antiplatelet or antithrombotic long-term use     Arrhythmia     Central serous retinopathy 2001    Resolved 9/2001    CHRONIC NECK PAIN 1995    Depressive disorder     Depressive disorder, not elsewhere classified 2001    Elevated coronary artery calcium score=7/1/21 08/03/2021    Elevated  coronary artery calcium score=2021    History of blood transfusion 2019    during left hip pinning, during surgery for patella    Lateral epicondylitis     MIXED HYPERLIPIDEMIA, LDL GOAL <160     LDL goal < 160    Motion sickness     MYXOID LIPOSARCOMA 2000    Left thigh, S/P excision, radiation  at [facility]    Myxoid liposarcoma (HCC) 2004    CHRONIC LEFT THIGH LYMPHEDEMA    Nontoxic multinodular goiter 2005    needs yearly US    ALEXANDER (obstructive sleep apnea) 7/3/2024    Osteoporosis, unspecified     Other chronic pain     fx ribs left     Other lymphedema 2000    left thigh, gets regular PT for this    Overactive bladder     PAD (peripheral artery disease) (H24) 2018    PONV (postoperative nausea and vomiting)     SHINGLES     Sprain of lumbosacral (joint) (ligament)     right    Unspecified hearing loss     chronic tinnitus    Unspecified tinnitus      Gynecologic history:  Age of menarche at 11, , age of first pregnancy at 29, 5 years of OCP use, no HRT or fertility treatment.    PAST SURGICAL HISTORY:  Past Surgical History:   Procedure Laterality Date    ARTHROPLASTY HIP Left 10/4/2019    Procedure: Removal of left femoral hardware with a conversion to left total hip arthroplasty using a Biomet Annalisa femoral stem with an OsseoTi acetabular shell and a dual mobility bearing surface;  Surgeon: Spencer Celeste MD;  Location: [facility] OR    BIOPSY  2000    BIOPSY BREAST SEED LOCALIZATION Right 2022    Procedure: right breast tag localized lumpectomy;  Surgeon: Shelly Carr MD;  Location:  OR    BIOPSY NODE SENTINEL Right 2022    Procedure: with right sentinel lymph node biopsy;  Surgeon: Shelly Carr MD;  Location:  OR    BYPASS GRAFT FEMOROPOPLITEAL Left 2019    Procedure: LEFT FEMORAL TO ABOVE KNEE POPLITEAL  BYPASS WITH POLYTETRAFLUOROETHYLENE GRAFT;  Surgeon: Shade Owens MD;  Location:  OR     COLONOSCOPY N/A 2/5/2016    Procedure: COMBINED COLONOSCOPY, SINGLE OR MULTIPLE BIOPSY/POLYPECTOMY BY BIOPSY;  Surgeon: Varun Stanley MD, MD;  Location:  GI    COLONOSCOPY N/A 12/10/2021    Procedure: COLONOSCOPY, WITH POLYPECTOMIES  USING COLD  SNARE,   CLIP APPLIED X2;  Surgeon: Dayton Luna MD;  Location:  GI    CYSTOSCOPY      EXCISION MALIG LESION>1.25CM  5/2000    Myxoid Liposarcoma      EXPLORE GROIN Right 5/1/2018    Procedure: EXPLORE GROIN;  EMERGENCY RIGHT FEMORAL EXPLORATION WITH FEMORAL ARTERY REPAIR.    EBL: 50mL;  Surgeon: Shade Owens MD;  Location: [facility] OR    HC COLP CERVIX/UPPER VAGINA  07/1997    Negative    HC DILATION/CURETTAGE DIAG/THER NON OB  02/1997    Post menopausal bleeding on HRT, negative    HIP SURGERY Left 04/13/2019    IR ANGIOGRAM THROUGH CATHETER FOLLOW UP  12/20/2018    IR ANGIOGRAM THROUGH CATHETER FOLLOW UP  12/21/2018    IR LOWER EXTREMITY ANGIOGRAM LEFT  12/19/2018    OPEN REDUCTION INTERNAL FIXATION PATELLA Left 12/21/2020    Procedure: Left partial patella fracture excision with advancement of the quadriceps tendon;  Surgeon: Stephen Rhodes MD;  Location:  OR    OPEN REDUCTION INTERNAL FIXATION RODDING INTRAMEDULLARY TIBIA Left 12/21/2020    Procedure: Open reduction intramedullary nailing of left tibia fracture;  Surgeon: Stephen Rhodes MD;  Location: [facility] OR    REMOVE HARDWARE KNEE Left 5/31/2022    Procedure: Left knee patella hardware removal;  Surgeon: Spencer Celeste MD;  Location: [facility] OR    REPAIR TENDON QUADRICEPS Left 7/28/2021    Procedure: Left knee quadricepsplasty with intraoperative patellar fracture requiring open reduction and internal fixation of the patella;  Surgeon: Spencer Celeste MD;  Location: [facility] OR    REPAIR TENDON QUADRICEPS Left 5/31/2022    Procedure: Open left knee VY quadricepsplasty with hardware removal and allograft;  Surgeon: Spencer Celeste MD;  Location:  OR    VASCULAR SURGERY   04/30/2018    Left SFA stent in bypass graft    ZZC APPENDECTOMY      Appendectomy    ZZC NONSPECIFIC PROCEDURE  04/2000    Open Biopsy Left Thigh Liposarcoma    ZZHC COLONOSCOPY THRU STOMA, DIAGNOSTIC  2006    due 2010         SOCIAL HISTORY:  Social History     Socioeconomic History    Marital status:      Spouse name: Chris    Number of children: 3    Years of education: 14    Highest education level: Associate degree: academic program   Occupational History    Occupation: at home     Employer: NONE    Tobacco Use    Smoking status: Never     Passive exposure: Never    Smokeless tobacco: Never   Vaping Use    Vaping status: Never Used   Substance and Sexual Activity    Alcohol use: Never    Drug use: Never    Sexual activity: Yes     Partners: Male     Birth control/protection: Male Surgical     Comment:  had a vasectomy   Other Topics Concern    Parent/sibling w/ CABG, MI or angioplasty before 65F 55M? No   Social History Narrative    Not on file     Social Determinants of Health     Financial Resource Strain: Low Risk  (7/19/2024)    Financial Resource Strain     Within the past 12 months, have you or your family members you live with been unable to get utilities (heat, electricity) when it was really needed?: No   Food Insecurity: Low Risk  (7/19/2024)    Food Insecurity     Within the past 12 months, did you worry that your food would run out before you got money to buy more?: No     Within the past 12 months, did the food you bought just not last and you didn t have money to get more?: No   Transportation Needs: Low Risk  (7/19/2024)    Transportation Needs     Within the past 12 months, has lack of transportation kept you from medical appointments, getting your medicines, non-medical meetings or appointments, work, or from getting things that you need?: No   Physical Activity: Inactive (7/19/2024)    Exercise Vital Sign     Days of Exercise per Week: 0 days     Minutes of Exercise per Session:  "0 min   Stress: No Stress Concern Present (2024)    [employer] - Occupational Stress Questionnaire     Feeling of Stress : Only a little   Social Connections: Unknown (2024)    Social Connection and Isolation Panel [NHANES]     Frequency of Communication with Friends and Family: Not on file     Frequency of Social Gatherings with Friends and Family: Once a week     Attends Jainism Services: Not on file     Active Member of Clubs or Organizations: Not on file     Attends Club or Organization Meetings: Not on file     Marital Status: Not on file   Interpersonal Safety: Low Risk  (2024)    Interpersonal Safety     Do you feel physically and emotionally safe where you currently live?: Yes     Within the past 12 months, have you been hit, slapped, kicked or otherwise physically hurt by someone?: No     Within the past 12 months, have you been humiliated or emotionally abused in other ways by your partner or ex-partner?: No   Housing Stability: Low Risk  (2024)    Housing Stability     Do you have housing? : Yes     Are you worried about losing your housing?: No         FAMILY HISTORY:  Family History   Problem Relation Age of Onset    C.A.D. Father         MI 57    Alcohol/Drug Father         etoh    Coronary Artery Disease Father     Obesity Mother     Osteoporosis Mother     Colon Cancer Brother 70    Hyperlipidemia Son     Anxiety Disorder Son     Hyperlipidemia Son         very high, experimental drug    C.A.D. Paternal Grandmother         ascvd    Diabetes Maternal Grandmother     Cancer Maternal Grandmother     C.A.D. Paternal Uncle         Mi  age 48    Cancer Maternal Aunt         pancreatic CA    Hyperlipidemia Son     Hyperlipidemia Cousin      Brother with colon cancer.  Negative for breast, ovarian or prostate cancer.    PHYSICAL EXAM:  Vital signs:  /75   Pulse 79   Resp 16   Ht 1.638 m (5' 4.5\")   Wt 71.2 kg (157 lb)   LMP  (LMP Unknown)  "  SpO2 99%   BMI 26.53 kg/m     GENERAL/CONSTITUTIONAL: No acute distress.  EYES: No erythema or scleral icterus.  LYMPH: No cervical, supraclavicular, axillary adenopathy.   BREAST: No palpable masses in either breast. Nipples are everted bilaterally with no discharge. No erythema, ulceration, or dimpling of the skin.  RESPIRATORY: No audible cough or wheezing.   GASTROINTESTINAL: No hepatosplenomegaly, masses, or tenderness. No guarding.  No distention.  MUSCULOSKELETAL: Warm and well-perfused, no cyanosis, clubbing; chronic left lower extremity edema.  NEUROLOGIC: No focal motor deficits. Alert, oriented, answers questions appropriately.  INTEGUMENTARY: No rashes or jaundice.  GAIT: Steady, does not use assistive device    LABS:  CBC RESULTS:   Recent Labs   Lab Test 12/08/22  1735   WBC 5.6   RBC 4.51   HGB 13.5   HCT 42.5   MCV 94   MCH 29.9   MCHC 31.8   RDW 14.4         Last Comprehensive Metabolic Panel:  Sodium   Date Value Ref Range Status   07/08/2024 141 135 - 145 mmol/L Final   05/17/2021 142 133 - 144 mmol/L Final     Potassium   Date Value Ref Range Status   07/08/2024 4.5 3.4 - 5.3 mmol/L Final   05/26/2022 3.9 3.4 - 5.3 mmol/L Final   05/17/2021 4.3 3.4 - 5.3 mmol/L Final     Chloride   Date Value Ref Range Status   07/08/2024 109 (H) 98 - 107 mmol/L Final   05/26/2022 106 94 - 109 mmol/L Final   05/17/2021 107 94 - 109 mmol/L Final     Carbon Dioxide   Date Value Ref Range Status   05/17/2021 32 20 - 32 mmol/L Final     Carbon Dioxide (CO2)   Date Value Ref Range Status   07/08/2024 24 22 - 29 mmol/L Final   05/26/2022 29 20 - 32 mmol/L Final     Anion Gap   Date Value Ref Range Status   07/08/2024 8 7 - 15 mmol/L Final   05/26/2022 4 3 - 14 mmol/L Final   05/17/2021 3 3 - 14 mmol/L Final     Glucose   Date Value Ref Range Status   07/08/2024 93 70 - 99 mg/dL Final   06/01/2022 137 (H) 70 - 99 mg/dL Final   05/17/2021 78 70 - 99 mg/dL Final     Comment:     Fasting specimen     Urea  Nitrogen   Date Value Ref Range Status   07/08/2024 12.9 8.0 - 23.0 mg/dL Final   05/26/2022 17 7 - 30 mg/dL Final   05/17/2021 13 7 - 30 mg/dL Final     Creatinine   Date Value Ref Range Status   07/08/2024 0.64 0.51 - 0.95 mg/dL Final   05/17/2021 0.75 0.52 - 1.04 mg/dL Final     GFR Estimate   Date Value Ref Range Status   07/08/2024 >90 >60 mL/min/1.73m2 Final     Comment:     eGFR calculated using 2021 CKD-EPI equation.   05/17/2021 81 >60 mL/min/[1.73_m2] Final     Comment:     Non  GFR Calc  Starting 12/18/2018, serum creatinine based estimated GFR (eGFR) will be   calculated using the Chronic Kidney Disease Epidemiology Collaboration   (CKD-EPI) equation.       Calcium   Date Value Ref Range Status   07/08/2024 9.5 8.8 - 10.2 mg/dL Final   05/17/2021 9.3 8.5 - 10.1 mg/dL Final     Bilirubin Total   Date Value Ref Range Status   10/11/2023 0.4 <=1.2 mg/dL Final   05/17/2021 0.4 0.2 - 1.3 mg/dL Final     Alkaline Phosphatase   Date Value Ref Range Status   12/21/2023 68 40 - 150 U/L Final     Comment:     Reference intervals for this test were updated on 11/14/2023 to more accurately reflect our healthy population. There may be differences in the flagging of prior results with similar values performed with this method. Interpretation of those prior results can be made in the context of the updated reference intervals.   05/17/2021 88 40 - 150 U/L Final     ALT   Date Value Ref Range Status   10/11/2023 19 0 - 50 U/L Final     Comment:     Reference intervals for this test were updated on 6/12/2023 to more accurately reflect our healthy population. There may be differences in the flagging of prior results with similar values performed with this method. Interpretation of those prior results can be made in the context of the updated reference intervals.     05/17/2021 26 0 - 50 U/L Final     AST   Date Value Ref Range Status   10/11/2023 33 0 - 45 U/L Final     Comment:     Reference intervals for  this test were updated on 6/12/2023 to more accurately reflect our healthy population. There may be differences in the flagging of prior results with similar values performed with this method. Interpretation of those prior results can be made in the context of the updated reference intervals.   05/17/2021 20 0 - 45 U/L Final       PATHOLOGY:  None new.    IMAGING:  Reviewed as per A/P.    ASSESSMENT/PLAN:  Angeli Jacobson is a 74 year old female with the following issues:  1. Pathologic prognostic stage IA, [id number], grade 2 invasive ductal carcinoma of the right upper outer breast, ER positive (%), ND positive (70%), HER2/compa FISH negative  -Angeli is s/p right lumpectomy and radiation completed 3/21/2023.    --She has no clinical evidence for recurrent breast cancer by physical exam.  --I advised a total of 5 years of hormone blockade therapy.  --Due to increase in mental and physical fatigue, she switched from anastrozole to letrozole on 8/30/2023.  --She is tolerating letrozole relatively well so far with some right thumb joint pain alleviated with steroid injection.  --Tamoxifen would be less optimal given concern for increased risk of blood clots.  --I answered questions she had regarding hair thinning on AI therapy.  --Due for next annual bilateral mammogram for 11/2024.    2.  History of myxoid liposarcoma of left thigh  - Status post excision and radiation completed in 2000.  She did not receive chemotherapy for her liposarcoma.  This has resulted in left lower extremity lymphedema.  - Continue lymphedema therapy and compression stockings.    3.  Peripheral arterial disease  -Has history of left lower extremity peripheral arterial disease and is status post femoral-popliteal bypass with later stent to bypass.  She is on aspirin and Plavix.    4. Osteoporosis  --History of taking alendronate, off for past year.  - DEXA scan from 12/22/2022 and 12/27/2023 showed osteoporosis. Machines were  not the same and therefore results not comparable.  - Continue adequate calcium and vitamin D, denosumab/Prolia, next due today.  --May need repeat DEXA end of this year to compare to prior to ensure Prolia is improving her bone density.  --She has a follow-up with endocrinology soon.    Return in 6 months.    Emilia Angulo MD  [facility] Hematology/Oncology     Total time spent today: 30 minutes in chart review, patient evaluation, counseling, documentation, test and/or medication/prescription orders, and coordination of care.     The longitudinal plan of care for the diagnosis(es)/condition(s) as documented were addressed during this visit. Due to the added complexity in care, I will continue to support Angeli in the subsequent management and with ongoing continuity of care.

## 2024-07-19 NOTE — PROGRESS NOTES
Patient was offered choice of vendor and chose [facility].  Patient Angeli Jacobson was set up at [place] on July 19, 2024. Patient received a Resmed Airsense 10 Pressures were set at  5-15 cm H2O.   Patient s ramp is 5 cm H2O for Auto and FLEX/EPR is EPR, 2.  Patient received a Resmed Mask name: Airfit P10 Pillow mask size Small, Standard, heated tubing and heated humidifier.  Patient has the following compliance requirements: using and visit requirements  Patient has a follow up due 8.19.24 - 10.19.24 with Amber Baumann, PA-C . Tracy L Fahrenkamp

## 2024-07-19 NOTE — TELEPHONE ENCOUNTER
Reason for Call:  Appointment Request    Patient requesting this type of appt:  sleep follow up    Requested provider:  Charley Martinez    Reason patient unable to be scheduled: Not within requested timeframe    When does patient want to be seen/preferred time:  August 19-October 19    Comments: none    Could we send this information to you in [facility] or would you prefer to receive a phone call?:   Patient would prefer a phone call   Okay to leave a detailed message?: Yes at Home number on file [phone] (home)    Call taken on 7/19/2024 at 4:24 PM by Eugenio Mcbride

## 2024-07-21 NOTE — RESULT ENCOUNTER NOTE
Hello -    Here are my comments about the recent results: Urine calcium is borderline, parathyroid hormone is mildly elevated. Await for imaging.    Regards,   Jackson Bryant MD 
No

## 2024-07-22 ENCOUNTER — DOCUMENTATION ONLY (OUTPATIENT)
Dept: SLEEP MEDICINE | Facility: CLINIC | Age: 74
End: 2024-07-22
Payer: COMMERCIAL

## 2024-07-22 NOTE — PROGRESS NOTES
3 day Sleep therapy management telephone visit    Diagnostic AHI:    HST: 8.9          Confirmed with patient at time of call- Yes Patient is still interested in STM service       Subjective measures:  Pt reports doing well with cpap.  Pt was given my contact information and instructed to reach out with any questions or concerns.       Order settings:  CPAP MIN CPAP MAX   5 cm H2O 15 cm H2O       Device settings:  CPAP MIN CPAP MAX EPR RESMED SOFT RESPONSE SETTING   5.0 cm  H20 15.0 cm  H20 TWO OFF       Compliance 100 %    Assessment: Nightly usage over four hours     Action plan: Patient to have 14 day STM visit.     Patient has the following upcoming sleep appts:  Future Sleep Appointments         Provider Department    12/20/2024 9:00 AM (Arrive by 8:45 AM) Charley Martinez PA-C [facility] [place]            Replacement device: No  STM ordered by provider: Yes     Total time spent on accessing and  interpreting remote patient PAP therapy data  10 minutes    Total time spent counseling, coaching  and reviewing PAP therapy data with patient  2 minutes    [id number] no

## 2024-07-22 NOTE — TELEPHONE ENCOUNTER
Patient scheduled first available and on wait list. Clinic staff will call if sooner appointment becomes available.     Jyoti REDDY RN  [facility]     Subjective


Progress Note Date: 12/10/20








64-year-old male patient and the patient came into the emergency department 

yesterday because of generalized weakness shortness of breath.  The patient had 

a pulse ox of 70%.  The patient is a nonsmoker.  Apparently symptoms of been 

progressively getting worse over the past 10 days. she originally got sick 

around Thanksgiving and her symptoms were waxing and waning and progressively 

she got worse and she ultimately had come to the hospital because of worsening 

shortness of breath. He had some fever and chills.  He had diminished appetite. 

He had decreased taste and smell.  The patient tested positive for COVID 19 by 

PCR and the patient is currently being treated for this pneumonia.  The patient 

was admitted because of 12.6.  The patient has a platelet count of 472.  

Preservation.  Positive normal.  D-dimer is not elevated at 0.53.  The ferritin 

level is at 360, the patient has a mild transaminitis with an AST of 76, ALP of 

65 with an LDH of 1038.  CRP is at 63 and appropriate LEVEL IS AT 0.04.  REST X-

RAY SHOWING NEW BILATERAL INTERSTITIAL INFILTRATION PERIHILAR.  THE PATIENT IS 

CURRENTLY ON OXYGEN AND THE PATIENT IS CURRENTLY ON 15 L TO MAINTAIN A 

SATURATION ABOVE 90%.  She is afebrile. 








on today's evaluation of 12/08/2020, the patient is a bit struggling with her 

breathing.  She was able to sit up on a chair or morning.  Noted the patient was

started on a combination of oxygen delivery systems including high flow oxygen 

at 15 L nasal cannula and 100% nonrebreather facemask to maintain a saturation 

between 88-92%.  She has some limited cough.  No significant sputum production. 

She is laying comfortably in bed.  she is having some mild degree of shortness 

of breath even at rest.  She is not using her muscles of breathing.   Note that 

the patient was started on a combination of Decadron and Remdesivir I'm also 

inclining giving her a unit of convalescence as her condition. no nausea.  No 

vomiting.  No diarrhea.  No abdominal pain.  No altered mentation.





on 12/09/2020 16 the patient for a follow-up.  The patient is currently on a 15 

L high flow oxygen.  On and off she is also using the 100% nonrebreather 

facemask.  She is doing well.  No specific complaints.  She is getting less 

short of breath.  Her lungs are less achy on today's evaluation.  No nausea.  No

vomiting.  She is on day 2 of Remdesivir she is also on Decadron.  She receives 

convalescent plasma.  No nausea.  No vomiting.  No diarrhea.





On today's evaluation of 12/10/2020, the patient feels that she is doing 

slightly better.  She still on 100% nonrebreather facemask and 15 L of oxygen by

nasal cannula.  She is afebrile.  She is taken Remdesivir day #3 and Decadron.  

Her inflammatory markers show a d-dimer of 1.47, ferritin level is up to 1059, 

her LDH level is down to 509 and her CRP level is down to 22.6.  No nausea.  No 

vomiting.  No diarrhea.  She was able to sit up on a recliner all day.  

Currently she is back in bed.  No other new complaints otherwise for now.  No 

improvement in her oxygenation. Her chest x-ray from yesterday showed bilateral 

airspace disease and some more confluent density in the upper lobes.





Objective





- Vital Signs


Vital signs: 


                                   Vital Signs











Temp  98.2 F   12/10/20 11:00


 


Pulse  74   12/10/20 11:00


 


Resp  18   12/10/20 11:00


 


BP  154/75   12/10/20 11:00


 


Pulse Ox  94 L  12/10/20 11:00








                                 Intake & Output











 12/09/20 12/10/20 12/10/20





 18:59 06:59 18:59


 


Weight   113.398 kg


 


Other:   


 


  Voiding Method  Bedside Commode Bedside Commode


 


  # Voids 2 4 


 


  # Bowel Movements 1 1 














- Exam








Gen. appearance she is calm comfortable mild degree of respiratory distress 

currently on 15 L of oxygen by nasal cannula in addition to 100% nonrebreather 

facemask.


Head exam was generally normal. There was no scleral icterus or corneal arcus. 

Mucous membranes were moist.


Neck was supple and without jugular venous distension, thyromegaly, or carotid 

bruits. Carotids were easily palpable bilaterally. There was no adenopathy.


Lungs sounds are diminished and the patient is contacted the lung bases.


Cardiac exam revealed the PMI to be normally situated and sized. The rhythm was 

regular and no extrasystoles were noted during several minutes of auscultation. 

The first and second heart sounds were normal and physiologic splitting of the 

second heart sound was noted. There were no murmurs, rubs, clicks, or gallops.


Abdomen abdomen


Abdominal exam revealed normal bowel sounds. The abdomen was soft, non-tender, 

and without masses, organomegaly, or appreciable enlargement of the abdominal 

aorta.


Examination of the extremities revealed easily palpable radial, femoral and 

pedal pulses. There was no cyanosis, clubbing or edema.


Examination of the skin revealed no evidence of significant rashes, suspicious 

appearing nevi or other concerning lesions.








- Labs


CBC & Chem 7: 


                                 12/05/20 18:30





                                 12/05/20 18:30


Labs: 


                  Abnormal Lab Results - Last 24 Hours (Table)











  12/10/20 12/10/20 Range/Units





  06:21 06:21 


 


D-Dimer  1.47 H   (<0.60)  mg/L FEU


 


Ferritin   1059.4 H  (10.0-291.0)  ng/mL


 


Lactate Dehydrogenase   506 H  (120-246)  U/L


 


C-Reactive Protein   22.6 H  (0.0-0.8)  mg/dL








                      Microbiology - Last 24 Hours (Table)











 12/05/20 18:30 Blood Culture - Preliminary





 Blood    No Growth after 96 hours














Assessment and Plan


Plan: 





1 acute bilateral pneumonia secondary to Covid 19 infection  and the patient was

probably diagnosed  and became symptomatic less than 10 days ago and the patient

will be an adequate candidate for steroids and Remdesivir #3,clinically improved

and the patient also received a dose of convalescent plasma.  Despite her 

clinical improvement, the patient's oxygenation is unchanged the patient 

continues to be on same amount of oxygen which is 100% nonrebreather with 15 L 

nasal cannula.





2 acute hypoxic respiratory failure currently on 15 L of oxygen by nasal 

cannula, in addition to 100% nonrebreather facemask.





3 elevated inflammatory markers, mild





4 obesity





5 chronic back pain





Plan





Utilizing a combination of Decadron 6 mg by mouth daily, Remdesivir day #3 , 

vitamin C, vitamin D, zinc, melatonin, and the patient will be gradually wean 

off FiO2 as tolerated to maintain saturation above 90%.  Provide an incentive 

spirometer.  Monitor inflammatory markers  from today shows improvement in the 

LDH and the CRP.  Ferritin is on the rise.  D-dimer slightly elevated..  Attempt

to titrate FiO2 as tolerated..    She is currently stable.  Monitor the pulse 

ox.

## 2024-07-23 ENCOUNTER — OFFICE VISIT (OUTPATIENT)
Dept: INTERNAL MEDICINE | Facility: CLINIC | Age: 74
End: 2024-07-23
Payer: COMMERCIAL

## 2024-07-23 VITALS
WEIGHT: 158.8 LBS | RESPIRATION RATE: 16 BRPM | BODY MASS INDEX: 26.46 KG/M2 | SYSTOLIC BLOOD PRESSURE: 127 MMHG | DIASTOLIC BLOOD PRESSURE: 78 MMHG | OXYGEN SATURATION: 95 % | HEART RATE: 78 BPM | HEIGHT: 65 IN | TEMPERATURE: 98.5 F

## 2024-07-23 DIAGNOSIS — Z00.00 ROUTINE GENERAL MEDICAL EXAMINATION AT A HEALTH CARE FACILITY: Primary | ICD-10-CM

## 2024-07-23 PROCEDURE — G0439 PPPS, SUBSEQ VISIT: HCPCS | Performed by: INTERNAL MEDICINE

## 2024-07-23 ASSESSMENT — PAIN SCALES - GENERAL: PAINLEVEL: NO PAIN (0)

## 2024-07-23 NOTE — PATIENT INSTRUCTIONS
Plan:  Labs tomorrow  2. Continue same meds, same doses for now   3. The following vaccines are recommended for you. Please check with your insurance about coverage.  Some insurances cover better if you have these vaccines at the pharmacy:  -- RSV, flu / Covid  -- Pneumonia 20  4. Next ANNUAL EXAM after July 29, 2025

## 2024-07-23 NOTE — PROGRESS NOTES
Dr Way's note    Patient's instructions / PLAN:                                                        Plan:  Labs tomorrow  2. Continue same meds, same doses for now   3. The following vaccines are recommended for you. Please check with your insurance about coverage.  Some insurances cover better if you have these vaccines at the pharmacy:  -- RSV, flu / Covid  -- Pneumonia 20  4. Next ANNUAL EXAM after July 29, 2025          ASSESSMENT & PLAN:                                                      (Z00.00) Routine general medical examination at a health care facility  (primary encounter diagnosis)  Comment:   Plan:        Chief Complaint:                                                        Annual exam  Follow up chronic medical problems     present  SUBJECTIVE:                                                    History of present illness     We reviewed the chronic medical problems as above.   I reviewed the recent tests results in Epic     Med prob -- f/up w specialists  No ac c/o     Fasting labs - dr Prince tomorrow     Chr L leg lymphedema      ROS:   ROS: negative for fever, chills, cough, wheezes, chest pain, shortness of breath, vomiting, abdominal pain, leg swelling     PMHx: - reviewed  Past Medical History:   Diagnosis Date    * * * SBE PROPHYLAXIS * * * 1998    Amox 500mg, take 4 tabs one hour prior to procedure.Takes this because of lymphedema secondary from leg surgey    Antiplatelet or antithrombotic long-term use     Arrhythmia     Central serous retinopathy 2001    Resolved 9/2001    CHRONIC NECK PAIN 1995    Depressive disorder     Depressive disorder, not elsewhere classified 2001    Elevated coronary artery calcium score=7/1/21 08/03/2021    Elevated coronary artery calcium score=7/1/21 08/03/2021    History of blood transfusion 04/2019 12/2020    during left hip pinning, during surgery for patella    Lateral epicondylitis     MIXED HYPERLIPIDEMIA, LDL GOAL <160 1998    LDL goal  < 160    Motion sickness     MYXOID LIPOSARCOMA 2000    Left thigh, S/P excision, radiation  at [facility]    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    CHRONIC LEFT THIGH LYMPHEDEMA    Nontoxic multinodular goiter 2005    needs yearly US    ALEXANDER (obstructive sleep apnea) 7/3/2024    Osteoporosis, unspecified 2001    Other chronic pain     fx ribs left     Other lymphedema 2000    left thigh, gets regular PT for this    Overactive bladder     PAD (peripheral artery disease) (H24) 04/20/2018    PONV (postoperative nausea and vomiting)     SHINGLES 2001    Sprain of lumbosacral (joint) (ligament) 1995    right    Unspecified hearing loss 1998    chronic tinnitus    Unspecified tinnitus 1998       PSHx: reviewed  Past Surgical History:   Procedure Laterality Date    ARTHROPLASTY HIP Left 10/4/2019    Procedure: Removal of left femoral hardware with a conversion to left total hip arthroplasty using a Biomet Annalisa femoral stem with an OsseoTi acetabular shell and a dual mobility bearing surface;  Surgeon: Spencer Celeste MD;  Location: [facility] OR    BIOPSY  April 2000    BIOPSY BREAST SEED LOCALIZATION Right 12/14/2022    Procedure: right breast tag localized lumpectomy;  Surgeon: Shelly Carr MD;  Location:  OR    BIOPSY NODE SENTINEL Right 12/14/2022    Procedure: with right sentinel lymph node biopsy;  Surgeon: Shelly Carr MD;  Location:  OR    BYPASS GRAFT FEMOROPOPLITEAL Left 1/21/2019    Procedure: LEFT FEMORAL TO ABOVE KNEE POPLITEAL  BYPASS WITH POLYTETRAFLUOROETHYLENE GRAFT;  Surgeon: Shade Owens MD;  Location:  OR    COLONOSCOPY N/A 2/5/2016    Procedure: COMBINED COLONOSCOPY, SINGLE OR MULTIPLE BIOPSY/POLYPECTOMY BY BIOPSY;  Surgeon: Varun Stanley MD, MD;  Location:  GI    COLONOSCOPY N/A 12/10/2021    Procedure: COLONOSCOPY, WITH POLYPECTOMIES  USING COLD  SNARE,   CLIP APPLIED X2;  Surgeon: Dayton Luna MD;  Location:  GI    CYSTOSCOPY      EXCISION MALIG LESION>1.25CM   5/2000    Myxoid Liposarcoma      EXPLORE GROIN Right 5/1/2018    Procedure: EXPLORE GROIN;  EMERGENCY RIGHT FEMORAL EXPLORATION WITH FEMORAL ARTERY REPAIR.    EBL: 50mL;  Surgeon: Shade Owens MD;  Location: [facility] OR    HC COLP CERVIX/UPPER VAGINA  07/1997    Negative    HC DILATION/CURETTAGE DIAG/THER NON OB  02/1997    Post menopausal bleeding on HRT, negative    HIP SURGERY Left 04/13/2019    IR ANGIOGRAM THROUGH CATHETER FOLLOW UP  12/20/2018    IR ANGIOGRAM THROUGH CATHETER FOLLOW UP  12/21/2018    IR LOWER EXTREMITY ANGIOGRAM LEFT  12/19/2018    OPEN REDUCTION INTERNAL FIXATION PATELLA Left 12/21/2020    Procedure: Left partial patella fracture excision with advancement of the quadriceps tendon;  Surgeon: Stephen Rhodes MD;  Location: [facility] OR    OPEN REDUCTION INTERNAL FIXATION RODDING INTRAMEDULLARY TIBIA Left 12/21/2020    Procedure: Open reduction intramedullary nailing of left tibia fracture;  Surgeon: Stephen Rhodes MD;  Location: [facility] OR    REMOVE HARDWARE KNEE Left 5/31/2022    Procedure: Left knee patella hardware removal;  Surgeon: Spencer Celeste MD;  Location: [facility] OR    REPAIR TENDON QUADRICEPS Left 7/28/2021    Procedure: Left knee quadricepsplasty with intraoperative patellar fracture requiring open reduction and internal fixation of the patella;  Surgeon: Spencer Celeste MD;  Location: [facility] OR    REPAIR TENDON QUADRICEPS Left 5/31/2022    Procedure: Open left knee VY quadricepsplasty with hardware removal and allograft;  Surgeon: Spencer Celeste MD;  Location: [facility] OR    VASCULAR SURGERY  04/30/2018    Left SFA stent in bypass graft    ZZC APPENDECTOMY      Appendectomy    ZZC NONSPECIFIC PROCEDURE  04/2000    Open Biopsy Left Thigh Liposarcoma    ZZHC COLONOSCOPY THRU STOMA, DIAGNOSTIC  2006    due 2010        Soc Hx: No daily alcohol, no smoking  Social History     Socioeconomic History    Marital status:      Spouse name: Chris    Number of children: 3     Years of education: 14    Highest education level: Associate degree: academic program   Occupational History    Occupation: at home     Employer: NONE    Tobacco Use    Smoking status: Never     Passive exposure: Never    Smokeless tobacco: Never   Vaping Use    Vaping status: Never Used   Substance and Sexual Activity    Alcohol use: Never    Drug use: Never    Sexual activity: Yes     Partners: Male     Birth control/protection: Male Surgical     Comment:  had a vasectomy   Other Topics Concern    Parent/sibling w/ CABG, MI or angioplasty before 65F 55M? No   Social History Narrative    Not on file     Social Determinants of Health     Financial Resource Strain: Low Risk  (7/19/2024)    Financial Resource Strain     Within the past 12 months, have you or your family members you live with been unable to get utilities (heat, electricity) when it was really needed?: No   Food Insecurity: Low Risk  (7/19/2024)    Food Insecurity     Within the past 12 months, did you worry that your food would run out before you got money to buy more?: No     Within the past 12 months, did the food you bought just not last and you didn t have money to get more?: No   Transportation Needs: Low Risk  (7/19/2024)    Transportation Needs     Within the past 12 months, has lack of transportation kept you from medical appointments, getting your medicines, non-medical meetings or appointments, work, or from getting things that you need?: No   Physical Activity: Inactive (7/19/2024)    Exercise Vital Sign     Days of Exercise per Week: 0 days     Minutes of Exercise per Session: 0 min   Stress: No Stress Concern Present (7/19/2024)    [place] [employer] - Occupational Stress Questionnaire     Feeling of Stress : Only a little   Social Connections: Unknown (7/19/2024)    Social Connection and Isolation Panel [NHANES]     Frequency of Communication with Friends and Family: Not on file     Frequency of Social  Gatherings with Friends and Family: Once a week     Attends Catholic Services: Not on file     Active Member of Clubs or Organizations: Not on file     Attends Club or Organization Meetings: Not on file     Marital Status: Not on file   Interpersonal Safety: Low Risk  (2024)    Interpersonal Safety     Do you feel physically and emotionally safe where you currently live?: Yes     Within the past 12 months, have you been hit, slapped, kicked or otherwise physically hurt by someone?: No     Within the past 12 months, have you been humiliated or emotionally abused in other ways by your partner or ex-partner?: No   Housing Stability: Low Risk  (2024)    Housing Stability     Do you have housing? : Yes     Are you worried about losing your housing?: No        Fam Hx: reviewed  Family History   Problem Relation Age of Onset    C.A.D. Father         MI 57    Alcohol/Drug Father         etoh    Coronary Artery Disease Father     Obesity Mother     Osteoporosis Mother     Colon Cancer Brother 70    Hyperlipidemia Son     Anxiety Disorder Son     Hyperlipidemia Son         very high, experimental drug    C.A.D. Paternal Grandmother         ascvd    Diabetes Maternal Grandmother     Cancer Maternal Grandmother     C.A.D. Paternal Uncle         Mi  age 48    Cancer Maternal Aunt         pancreatic CA    Hyperlipidemia Son     Hyperlipidemia Cousin          Screening: reviewed      All: reviewed    Meds: reviewed  Current Outpatient Medications   Medication Sig Dispense Refill    calcium carbonate 600 mg-vitamin D 400 units (CALTRATE) 600-400 MG-UNIT per tablet Take 1 chew tab by mouth daily      cefdinir (OMNICEF) 300 MG capsule Take 1 capsule (300 mg) by mouth 2 times daily 20 capsule 0    clopidogrel (PLAVIX) 75 MG tablet Take 1 tablet (75 mg) by mouth daily 90 tablet 3    denosumab (PROLIA) 60 MG/ML SOSY injection       evolocumab (REPATHA) 140 MG/ML prefilled autoinjector Inject 1 mL (140 mg) Subcutaneous  "every 14 days 6 mL 2    ezetimibe (ZETIA) 10 MG tablet Take 1 tablet (10 mg) by mouth daily 90 tablet 3    letrozole (FEMARA) 2.5 MG tablet Take 1 tablet (2.5 mg) by mouth daily 90 tablet 3    levothyroxine (SYNTHROID/LEVOTHROID) 75 MCG tablet Take 1 tablet (75 mcg) by mouth daily 90 tablet 3    magnesium oxide 200 MG TABS       multivitamin, therapeutic (THERA-VIT) TABS tablet Take 1 tablet by mouth daily      nitroFURantoin macrocrystal-monohydrate (MACROBID) 100 MG capsule Take 1 capsule after intercourse 30 capsule 0    rivaroxaban ANTICOAGULANT (XARELTO) 2.5 MG TABS tablet Take 1 tablet (2.5 mg) by mouth 2 times daily 180 tablet 3    rosuvastatin (CRESTOR) 20 MG tablet Take 1 tablet (20 mg) by mouth daily 90 tablet 3    Sharps Container MISC Use as per  instructions for safe needle disposal 1 each 1    solifenacin (VESICARE) 5 MG tablet Take 1 tablet (5 mg) by mouth daily 90 tablet 1       OBJECTIVE:                                                    Physical Exam :  Blood pressure 127/78, pulse 78, temperature 98.5  F (36.9  C), temperature source Tympanic, resp. rate 16, height 1.638 m (5' 4.5\"), weight 72 kg (158 lb 12.8 oz), SpO2 95%, not currently breastfeeding.     NAD, appears comfortable  Skin clear, no rashes  Neck: supple, no JVD,  no thyroidmegaly  Lymph nodes non palpable in the cervical, supraclavicular axillaries,   Chest: clear to auscultation with good respiratory effort  Cardiac: S1S2, RRR, no mgr appreciated  Abdomen: soft, not tender, not distended, audible bowel sound, no hepatosplenomegaly, no palpable masses, no abdominal bruits  Extremities: no cyanosis, clubbing or edema.   Neuro: A, Ox3, no focal signs.       Patient has been advised of split billing requirements and indicates understanding: Yes.  At the check in, at the      Edel Way MD  Internal Medicine     #########################################    Preventive Care Visit  [facility] " [facility]  Edel Nell Mccarty MD, Internal Medicine  Jul 23, 2024          Carmel Suh is a 74 year old, presenting for the following:  Wellness Visit        7/23/2024    10:15 AM   Additional Questions   Roomed by Val Agustin         Health Care Directive  Patient has a Health Care Directive on file  Advance care planning document is on file and is current.    HPI      Hyperlipidemia Follow-Up    Are you regularly taking any medication or supplement to lower your cholesterol?   Yes- rosuvastatin  Are you having muscle aches or other side effects that you think could be caused by your cholesterol lowering medication?  No    Hypertension Follow-up    Do you check your blood pressure regularly outside of the clinic? No   Are you following a low salt diet? Yes  Are your blood pressures ever more than 140 on the top number (systolic) OR more   than 90 on the bottom number (diastolic), for example 140/90? No        7/19/2024   General Health   How would you rate your overall physical health? Good   Feel stress (tense, anxious, or unable to sleep) Only a little      (!) STRESS CONCERN      7/19/2024   Nutrition   Diet: Low fat/cholesterol            7/19/2024   Exercise   Days per week of moderate/strenous exercise 0 days   Average minutes spent exercising at this level 0 min      (!) EXERCISE CONCERN      7/19/2024   Social Factors   Frequency of gathering with friends or relatives Once a week   Worry food won't last until get money to buy more No   Food not last or not have enough money for food? No   Do you have housing? (Housing is defined as stable permanent housing and does not include staying ouside in a car, in a tent, in an abandoned building, in an overnight shelter, or couch-surfing.) Yes   Are you worried about losing your housing? No   Lack of transportation? No   Unable to get utilities (heat,electricity)? No            7/23/2024   Fall Risk   Gait Speed Test (Document in seconds) 6    Gait Speed Test Interpretation Greater than 5.01 seconds - ABNORMAL             7/19/2024   Activities of Daily Living- Home Safety   Needs help with the following daily activites None of the above   Safety concerns in the home None of the above            7/19/2024   Dental   Dentist two times every year? Yes            7/19/2024   Hearing Screening   Hearing concerns? (!) I NEED TO ASK PEOPLE TO SPEAK UP OR REPEAT THEMSELVES.    (!) IT'S HARD TO FOLLOW A CONVERSATION IN A NOISY RESTAURANT OR CROWDED ROOM.       Multiple values from one day are sorted in reverse-chronological order         7/19/2024   Driving Risk Screening   Patient/family members have concerns about driving No            7/19/2024   General Alertness/Fatigue Screening   Have you been more tired than usual lately? (!) YES            7/19/2024   Urinary Incontinence Screening   Bothered by leaking urine in past 6 months No            7/19/2024   TB Screening   Were you born outside of the [place]? No            Today's PHQ-2 Score:       7/23/2024     6:11 AM   PHQ-2 ( 1999 Pfizer)   Q1: Little interest or pleasure in doing things 0   Q2: Feeling down, depressed or hopeless 0   PHQ-2 Score 0   Q1: Little interest or pleasure in doing things Not at all   Q2: Feeling down, depressed or hopeless Not at all   PHQ-2 Score 0           7/19/2024   Substance Use   Alcohol more than 3/day or more than 7/wk Not Applicable   Do you have a current opioid prescription? No   How severe/bad is pain from 1 to 10? 2/10   Do you use any other substances recreationally? No        Social History     Tobacco Use    Smoking status: Never     Passive exposure: Never    Smokeless tobacco: Never   Vaping Use    Vaping status: Never Used   Substance Use Topics    Alcohol use: Never    Drug use: Never           11/28/2023   LAST FHS-7 RESULTS   1st degree relative breast or ovarian cancer No   Any relative bilateral breast cancer No   Any male have breast cancer No   Any ONE  woman have BOTH breast AND ovarian cancer No   Any woman with breast cancer before 50yrs No   2 or more relatives with breast AND/OR ovarian cancer No   2 or more relatives with breast AND/OR bowel cancer No               Do you have a current opioid prescription? No  Do you use any other controlled substances or medications that are not prescribed by a provider? None      ASCVD Risk   The 10-year ASCVD risk score (Della DK, et al., 2019) is: 20.6%    Values used to calculate the score:      Age: 74 years      Sex: Female      Is Non- : No      Diabetic: Yes      Tobacco smoker: No      Systolic Blood Pressure: 114 mmHg      Is BP treated: No      HDL Cholesterol: 54 mg/dL      Total Cholesterol: 146 mg/dL            Reviewed and updated as needed this visit by Provider                      Current providers sharing in care for this patient include:  Patient Care Team:  Edel Mccarty MD as PCP - General (Internal Medicine)  Verenice [facility] as Pharmacist (Pharmacist Ambulatory Care)  Irena Meier PA-C as Physician Assistant (Orthopaedic Surgery)  Leeann Sharma MD as Assigned Neuroscience Provider  Nancy Chawla PA-C as Physician Assistant (Urology)  Muriel Orosco PA-C as Physician Assistant (Family Medicine)  Emilia Angulo MD as MD (Hematology & Oncology)  Emilia Angulo MD as Assigned Cancer Care Provider  Spencer Wilder MD as MD (Cardiovascular Disease)  Elías Bridges MD as Physician (Physical Medicine and Rehabilitation)  Jackson Bryant MD as Physician (Endocrinology, Diabetes, and Metabolism)  Edel Mccarty MD as Assigned PCP  Jackson Bryant MD as Assigned Endocrinology Provider  Charley Martinez PA-C as Assigned Sleep Provider  Spencer Wilder MD as MD (Cardiovascular Disease)  Nancy Chawla PA-C as Physician Assistant (Urology)  remy Brown  "ELVA Anna CNP as Assigned Surgical Provider  Shade Owens MD as Assigned Heart and Vascular Provider  Trenton Sr DO as Assigned Musculoskeletal Provider    The following health maintenance items are reviewed in Epic and correct as of today:  Health Maintenance   Topic Date Due    DIABETIC FOOT EXAM  Never done    RSV VACCINE (Pregnancy & 60+) (1 - 1-dose 60+ series) Never done    COVID-19 Vaccine (8 - 2023-24 season) 05/15/2024    MICROALBUMIN  06/01/2024    MEDICARE ANNUAL WELLNESS VISIT  07/19/2024    ANNUAL REVIEW OF HM ORDERS  08/22/2024    INFLUENZA VACCINE (1) 09/01/2024    A1C  10/08/2024    MAMMO SCREENING  11/28/2024    LIPID  12/06/2024    TSH W/FREE T4 REFLEX  12/21/2024    EYE EXAM  05/15/2025    BMP  07/08/2025    FALL RISK ASSESSMENT  07/23/2025    DEXA  12/27/2025    COLORECTAL CANCER SCREENING  12/10/2026    ADVANCE CARE PLANNING  07/19/2028    DTAP/TDAP/TD IMMUNIZATION (3 - Td or Tdap) 08/28/2028    HEPATITIS C SCREENING  Completed    PHQ-2 (once per calendar year)  Completed    Pneumococcal Vaccine: 65+ Years  Completed    ZOSTER IMMUNIZATION  Completed    IPV IMMUNIZATION  Aged Out    HPV IMMUNIZATION  Aged Out    MENINGITIS IMMUNIZATION  Aged Out    RSV MONOCLONAL ANTIBODY  Aged Out    URINE DRUG SCREEN  Discontinued            Objective    Exam  Temp 98.5  F (36.9  C) (Tympanic)   Resp 16   Ht 1.638 m (5' 4.5\")   Wt 72 kg (158 lb 12.8 oz)   LMP  (LMP Unknown)   Breastfeeding No   BMI 26.84 kg/m     Estimated body mass index is 26.84 kg/m  as calculated from the following:    Height as of this encounter: 1.638 m (5' 4.5\").    Weight as of this encounter: 72 kg (158 lb 12.8 oz).    Physical Exam          7/23/2024   Mini Cog   Clock Draw Score 2 Normal   3 Item Recall 3 objects recalled   Mini Cog Total Score 5                 Signed Electronically by: Edel Mccarty MD    "

## 2024-07-24 ENCOUNTER — ONCOLOGY VISIT (OUTPATIENT)
Dept: ONCOLOGY | Facility: CLINIC | Age: 74
End: 2024-07-24
Attending: INTERNAL MEDICINE
Payer: COMMERCIAL

## 2024-07-24 ENCOUNTER — LAB (OUTPATIENT)
Dept: LAB | Facility: CLINIC | Age: 74
End: 2024-07-24
Payer: COMMERCIAL

## 2024-07-24 VITALS
WEIGHT: 157 LBS | HEIGHT: 65 IN | HEART RATE: 79 BPM | BODY MASS INDEX: 26.16 KG/M2 | RESPIRATION RATE: 16 BRPM | DIASTOLIC BLOOD PRESSURE: 75 MMHG | SYSTOLIC BLOOD PRESSURE: 115 MMHG | OXYGEN SATURATION: 99 %

## 2024-07-24 DIAGNOSIS — Z17.0 MALIGNANT NEOPLASM OF UPPER-OUTER QUADRANT OF RIGHT BREAST IN FEMALE, ESTROGEN RECEPTOR POSITIVE (H): Primary | ICD-10-CM

## 2024-07-24 DIAGNOSIS — Z12.31 ENCOUNTER FOR SCREENING MAMMOGRAM FOR BREAST CANCER: ICD-10-CM

## 2024-07-24 DIAGNOSIS — M81.0 AGE-RELATED OSTEOPOROSIS WITHOUT CURRENT PATHOLOGICAL FRACTURE: ICD-10-CM

## 2024-07-24 DIAGNOSIS — Z85.831 HISTORY OF SARCOMA OF SOFT TISSUE: ICD-10-CM

## 2024-07-24 DIAGNOSIS — C50.411 MALIGNANT NEOPLASM OF UPPER-OUTER QUADRANT OF RIGHT BREAST IN FEMALE, ESTROGEN RECEPTOR POSITIVE (H): Primary | ICD-10-CM

## 2024-07-24 DIAGNOSIS — E03.9 HYPOTHYROIDISM, UNSPECIFIED TYPE: ICD-10-CM

## 2024-07-24 DIAGNOSIS — E78.5 HYPERLIPIDEMIA LDL GOAL <70: ICD-10-CM

## 2024-07-24 LAB
ALBUMIN SERPL BCG-MCNC: 4.3 G/DL (ref 3.5–5.2)
ALP SERPL-CCNC: 59 U/L (ref 40–150)
ALT SERPL W P-5'-P-CCNC: 20 U/L (ref 0–50)
ANION GAP SERPL CALCULATED.3IONS-SCNC: 9 MMOL/L (ref 7–15)
AST SERPL W P-5'-P-CCNC: 28 U/L (ref 0–45)
BILIRUB SERPL-MCNC: 0.3 MG/DL
BUN SERPL-MCNC: 16.8 MG/DL (ref 8–23)
CALCIUM SERPL-MCNC: 9.8 MG/DL (ref 8.8–10.4)
CHLORIDE SERPL-SCNC: 106 MMOL/L (ref 98–107)
CHOLEST SERPL-MCNC: 108 MG/DL
CREAT SERPL-MCNC: 0.72 MG/DL (ref 0.51–0.95)
EGFRCR SERPLBLD CKD-EPI 2021: 87 ML/MIN/1.73M2
FASTING STATUS PATIENT QL REPORTED: YES
FASTING STATUS PATIENT QL REPORTED: YES
GLUCOSE SERPL-MCNC: 79 MG/DL (ref 70–99)
HCO3 SERPL-SCNC: 25 MMOL/L (ref 22–29)
HDLC SERPL-MCNC: 51 MG/DL
LDLC SERPL CALC-MCNC: 33 MG/DL
NONHDLC SERPL-MCNC: 57 MG/DL
POTASSIUM SERPL-SCNC: 4.6 MMOL/L (ref 3.4–5.3)
PROT SERPL-MCNC: 7 G/DL (ref 6.4–8.3)
SODIUM SERPL-SCNC: 140 MMOL/L (ref 135–145)
TRIGL SERPL-MCNC: 118 MG/DL
TSH SERPL DL<=0.005 MIU/L-ACNC: 0.77 UIU/ML (ref 0.3–4.2)

## 2024-07-24 PROCEDURE — 84443 ASSAY THYROID STIM HORMONE: CPT

## 2024-07-24 PROCEDURE — 36415 COLL VENOUS BLD VENIPUNCTURE: CPT

## 2024-07-24 PROCEDURE — 80061 LIPID PANEL: CPT

## 2024-07-24 PROCEDURE — 99214 OFFICE O/P EST MOD 30 MIN: CPT | Performed by: INTERNAL MEDICINE

## 2024-07-24 PROCEDURE — G0463 HOSPITAL OUTPT CLINIC VISIT: HCPCS | Performed by: INTERNAL MEDICINE

## 2024-07-24 PROCEDURE — 80053 COMPREHEN METABOLIC PANEL: CPT

## 2024-07-24 PROCEDURE — G2211 COMPLEX E/M VISIT ADD ON: HCPCS | Performed by: INTERNAL MEDICINE

## 2024-07-24 ASSESSMENT — PAIN SCALES - GENERAL: PAINLEVEL: NO PAIN (0)

## 2024-07-24 NOTE — LETTER
7/24/2024      Angeli Jacobson  [address]  [facility] [id number]      Dear Colleague,    Thank you for referring your patient, Angeli Jacobson, to the [facility]. Please see a copy of my visit note below.    [facility]    Hematology/Oncology Established Patient Note      Today's Date: 7/24/2024    Reason for follow-up:  Right breast cancer.    HISTORY OF PRESENT ILLNESS: Angeli aJcobson is a 74 year old female with history of left thigh liposarcoma status post excision in 2000 with subsequent left leg lymphedema and peripheral arterial disease status post redo left common femoral to popliteal artery bypass in January 2019, who presents with the following oncologic history:  1.  11/7/2022: Screening mammogram showed asymmetry in the right breast at 12:00, retroareolar far posterior.  Left breast negative.  2.  11/15/2022: Diagnostic right mammogram showed asymmetry in the posterior right breast, 8 cm from nipple.  Ultrasound of right breast at 12:00, 2 cm from nipple showed a small benign cyst but no definite sonographic correlate for the mammographic asymmetry.  3.  11/16/2022: Stereotactic guided right breast needle biopsy of 8 mm lesion at 3:00, 8 cm from the nipple showed grade 2 invasive ductal carcinoma, ER positive at %, WI positive at 60-70%, HER2 IHC = 2+ equivocal, HER2/compa FISH negative.  4. 12/14/2022: Underwent right breast lumpectomy with right axillary sentinel lymph node excision with Dr. Shelly Carr.  Pathology showed no residual invasive malignancy; 2 microscopic foci of DCIS, grade 2, largest measuring 1.2 mm; margins negative; total 2 right axillary lymph nodes negative.  5. 3/21/2023: Completed adjuvant radiation therapy to the right breast.   6. 4/10/2023: Started anastrozole. Discontinued 6/19/2023 due to weakness, joint stiffness, and brain fog.  7. 8/20/2023: Switched to letrozole.    INTERVAL  HISTORY:  Angeli reports feeling well.      REVIEW OF SYSTEMS:   14 point ROS was reviewed and is negative other than as noted above in HPI.       HOME MEDICATIONS:  Current Outpatient Medications   Medication Sig Dispense Refill     calcium carbonate 600 mg-vitamin D 400 units (CALTRATE) 600-400 MG-UNIT per tablet Take 1 chew tab by mouth daily       cefdinir (OMNICEF) 300 MG capsule Take 1 capsule (300 mg) by mouth 2 times daily 20 capsule 0     clopidogrel (PLAVIX) 75 MG tablet Take 1 tablet (75 mg) by mouth daily 90 tablet 3     denosumab (PROLIA) 60 MG/ML SOSY injection        evolocumab (REPATHA) 140 MG/ML prefilled autoinjector Inject 1 mL (140 mg) Subcutaneous every 14 days 6 mL 2     ezetimibe (ZETIA) 10 MG tablet Take 1 tablet (10 mg) by mouth daily 90 tablet 3     letrozole (FEMARA) 2.5 MG tablet Take 1 tablet (2.5 mg) by mouth daily 90 tablet 3     levothyroxine (SYNTHROID/LEVOTHROID) 75 MCG tablet Take 1 tablet (75 mcg) by mouth daily 90 tablet 3     magnesium oxide 200 MG TABS        multivitamin, therapeutic (THERA-VIT) TABS tablet Take 1 tablet by mouth daily       nitroFURantoin macrocrystal-monohydrate (MACROBID) 100 MG capsule Take 1 capsule after intercourse 30 capsule 0     rivaroxaban ANTICOAGULANT (XARELTO) 2.5 MG TABS tablet Take 1 tablet (2.5 mg) by mouth 2 times daily 180 tablet 3     rosuvastatin (CRESTOR) 20 MG tablet Take 1 tablet (20 mg) by mouth daily 90 tablet 3     Sharps Container MISC Use as per  instructions for safe needle disposal 1 each 1     solifenacin (VESICARE) 5 MG tablet Take 1 tablet (5 mg) by mouth daily 90 tablet 1         ALLERGIES:  Allergies   Allergen Reactions     Celexa [Citalopram Hydrobromide]      Decreased libido         PAST MEDICAL HISTORY:  Past Medical History:   Diagnosis Date     * * * SBE PROPHYLAXIS * * * 1998    Amox 500mg, take 4 tabs one hour prior to procedure.Takes this because of lymphedema secondary from leg surgey      Antiplatelet or antithrombotic long-term use      Arrhythmia      Central serous retinopathy 2001    Resolved 2001     CHRONIC NECK PAIN      Depressive disorder      Depressive disorder, not elsewhere classified      Elevated coronary artery calcium score=2021     Elevated coronary artery calcium score=2021     History of blood transfusion 2019    during left hip pinning, during surgery for patella     Lateral epicondylitis      MIXED HYPERLIPIDEMIA, LDL GOAL <160     LDL goal < 160     Motion sickness      MYXOID LIPOSARCOMA 2000    Left thigh, S/P excision, radiation  at [facility]     Myxoid liposarcoma (HCC) 2004    CHRONIC LEFT THIGH LYMPHEDEMA     Nontoxic multinodular goiter 2005    needs yearly US     ALEXANDER (obstructive sleep apnea) 7/3/2024     Osteoporosis, unspecified      Other chronic pain     fx ribs left      Other lymphedema 2000    left thigh, gets regular PT for this     Overactive bladder      PAD (peripheral artery disease) (H24) 2018     PONV (postoperative nausea and vomiting)      SHINGLES      Sprain of lumbosacral (joint) (ligament)     right     Unspecified hearing loss     chronic tinnitus     Unspecified tinnitus      Gynecologic history:  Age of menarche at 11, , age of first pregnancy at 29, 5 years of OCP use, no HRT or fertility treatment.    PAST SURGICAL HISTORY:  Past Surgical History:   Procedure Laterality Date     ARTHROPLASTY HIP Left 10/4/2019    Procedure: Removal of left femoral hardware with a conversion to left total hip arthroplasty using a Biomet Annalisa femoral stem with an OsseoTi acetabular shell and a dual mobility bearing surface;  Surgeon: Spencer Celeste MD;  Location: [facility] OR     BIOPSY  2000     BIOPSY BREAST SEED LOCALIZATION Right 2022    Procedure: right breast tag localized lumpectomy;  Surgeon: Shelly Carr MD;  Location: [facility] OR     BIOPSY NODE  SENTINEL Right 12/14/2022    Procedure: with right sentinel lymph node biopsy;  Surgeon: Shelly Carr MD;  Location:  OR     BYPASS GRAFT FEMOROPOPLITEAL Left 1/21/2019    Procedure: LEFT FEMORAL TO ABOVE KNEE POPLITEAL  BYPASS WITH POLYTETRAFLUOROETHYLENE GRAFT;  Surgeon: Shade Owens MD;  Location:  OR     COLONOSCOPY N/A 2/5/2016    Procedure: COMBINED COLONOSCOPY, SINGLE OR MULTIPLE BIOPSY/POLYPECTOMY BY BIOPSY;  Surgeon: Varun Stanley MD, MD;  Location:  GI     COLONOSCOPY N/A 12/10/2021    Procedure: COLONOSCOPY, WITH POLYPECTOMIES  USING COLD  SNARE,   CLIP APPLIED X2;  Surgeon: Dayton Luna MD;  Location:  GI     CYSTOSCOPY       EXCISION MALIG LESION>1.25CM  5/2000    Myxoid Liposarcoma       EXPLORE GROIN Right 5/1/2018    Procedure: EXPLORE GROIN;  EMERGENCY RIGHT FEMORAL EXPLORATION WITH FEMORAL ARTERY REPAIR.    EBL: 50mL;  Surgeon: Shade Owens MD;  Location:  OR     HC COLP CERVIX/UPPER VAGINA  07/1997    Negative     HC DILATION/CURETTAGE DIAG/THER NON OB  02/1997    Post menopausal bleeding on HRT, negative     HIP SURGERY Left 04/13/2019     IR ANGIOGRAM THROUGH CATHETER FOLLOW UP  12/20/2018     IR ANGIOGRAM THROUGH CATHETER FOLLOW UP  12/21/2018     IR LOWER EXTREMITY ANGIOGRAM LEFT  12/19/2018     OPEN REDUCTION INTERNAL FIXATION PATELLA Left 12/21/2020    Procedure: Left partial patella fracture excision with advancement of the quadriceps tendon;  Surgeon: Stephen Rhodes MD;  Location:  OR     OPEN REDUCTION INTERNAL FIXATION RODDING INTRAMEDULLARY TIBIA Left 12/21/2020    Procedure: Open reduction intramedullary nailing of left tibia fracture;  Surgeon: Stephen Rhodes MD;  Location:  OR     REMOVE HARDWARE KNEE Left 5/31/2022    Procedure: Left knee patella hardware removal;  Surgeon: Spencer Celeste MD;  Location:  OR     REPAIR TENDON QUADRICEPS Left 7/28/2021    Procedure: Left knee quadricepsplasty with intraoperative patellar  fracture requiring open reduction and internal fixation of the patella;  Surgeon: Spencer Celeste MD;  Location: [facility] OR     REPAIR TENDON QUADRICEPS Left 5/31/2022    Procedure: Open left knee VY quadricepsplasty with hardware removal and allograft;  Surgeon: Spencer Celeste MD;  Location: [facility] OR     VASCULAR SURGERY  04/30/2018    Left SFA stent in bypass graft     ZZC APPENDECTOMY      Appendectomy     ZZC NONSPECIFIC PROCEDURE  04/2000    Open Biopsy Left Thigh Liposarcoma     ZZHC COLONOSCOPY THRU STOMA, DIAGNOSTIC  2006    due 2010         SOCIAL HISTORY:  Social History     Socioeconomic History     Marital status:      Spouse name: Chris     Number of children: 3     Years of education: 14     Highest education level: Associate degree: academic program   Occupational History     Occupation: at home     Employer: NONE    Tobacco Use     Smoking status: Never     Passive exposure: Never     Smokeless tobacco: Never   Vaping Use     Vaping status: Never Used   Substance and Sexual Activity     Alcohol use: Never     Drug use: Never     Sexual activity: Yes     Partners: Male     Birth control/protection: Male Surgical     Comment:  had a vasectomy   Other Topics Concern     Parent/sibling w/ CABG, MI or angioplasty before 65F 55M? No   Social History Narrative     Not on file     Social Determinants of Health     Financial Resource Strain: Low Risk  (7/19/2024)    Financial Resource Strain      Within the past 12 months, have you or your family members you live with been unable to get utilities (heat, electricity) when it was really needed?: No   Food Insecurity: Low Risk  (7/19/2024)    Food Insecurity      Within the past 12 months, did you worry that your food would run out before you got money to buy more?: No      Within the past 12 months, did the food you bought just not last and you didn t have money to get more?: No   Transportation Needs: Low Risk  (7/19/2024)     Transportation Needs      Within the past 12 months, has lack of transportation kept you from medical appointments, getting your medicines, non-medical meetings or appointments, work, or from getting things that you need?: No   Physical Activity: Inactive (7/19/2024)    Exercise Vital Sign      Days of Exercise per Week: 0 days      Minutes of Exercise per Session: 0 min   Stress: No Stress Concern Present (7/19/2024)    [employer] - Occupational Stress Questionnaire      Feeling of Stress : Only a little   Social Connections: Unknown (7/19/2024)    Social Connection and Isolation Panel [NHANES]      Frequency of Communication with Friends and Family: Not on file      Frequency of Social Gatherings with Friends and Family: Once a week      Attends Buddhism Services: Not on file      Active Member of Clubs or Organizations: Not on file      Attends Club or Organization Meetings: Not on file      Marital Status: Not on file   Interpersonal Safety: Low Risk  (7/1/2024)    Interpersonal Safety      Do you feel physically and emotionally safe where you currently live?: Yes      Within the past 12 months, have you been hit, slapped, kicked or otherwise physically hurt by someone?: No      Within the past 12 months, have you been humiliated or emotionally abused in other ways by your partner or ex-partner?: No   Housing Stability: Low Risk  (7/19/2024)    Housing Stability      Do you have housing? : Yes      Are you worried about losing your housing?: No         FAMILY HISTORY:  Family History   Problem Relation Age of Onset     C.A.D. Father         MI 57     Alcohol/Drug Father         etoh     Coronary Artery Disease Father      Obesity Mother      Osteoporosis Mother      Colon Cancer Brother 70     Hyperlipidemia Son      Anxiety Disorder Son      Hyperlipidemia Son         very high, experimental drug     C.A.D. Paternal Grandmother         ascvd     Diabetes Maternal Grandmother       "Cancer Maternal Grandmother      DANIELA. Paternal Uncle         Mi  age 48     Cancer Maternal Aunt         pancreatic CA     Hyperlipidemia Son      Hyperlipidemia Cousin      Brother with colon cancer.  Negative for breast, ovarian or prostate cancer.    PHYSICAL EXAM:  Vital signs:  /75   Pulse 79   Resp 16   Ht 1.638 m (5' 4.5\")   Wt 71.2 kg (157 lb)   LMP  (LMP Unknown)   SpO2 99%   BMI 26.53 kg/m     GENERAL/CONSTITUTIONAL: No acute distress.  EYES: No erythema or scleral icterus.  LYMPH: No cervical, supraclavicular, axillary adenopathy.   BREAST: No palpable masses in either breast. Nipples are everted bilaterally with no discharge. No erythema, ulceration, or dimpling of the skin.  RESPIRATORY: No audible cough or wheezing.   GASTROINTESTINAL: No hepatosplenomegaly, masses, or tenderness. No guarding.  No distention.  MUSCULOSKELETAL: Warm and well-perfused, no cyanosis, clubbing; chronic left lower extremity edema.  NEUROLOGIC: No focal motor deficits. Alert, oriented, answers questions appropriately.  INTEGUMENTARY: No rashes or jaundice.  GAIT: Steady, does not use assistive device    LABS:  CBC RESULTS:   Recent Labs   Lab Test 22  1735   WBC 5.6   RBC 4.51   HGB 13.5   HCT 42.5   MCV 94   MCH 29.9   MCHC 31.8   RDW 14.4         Last Comprehensive Metabolic Panel:  Sodium   Date Value Ref Range Status   2024 141 135 - 145 mmol/L Final   2021 142 133 - 144 mmol/L Final     Potassium   Date Value Ref Range Status   2024 4.5 3.4 - 5.3 mmol/L Final   2022 3.9 3.4 - 5.3 mmol/L Final   2021 4.3 3.4 - 5.3 mmol/L Final     Chloride   Date Value Ref Range Status   2024 109 (H) 98 - 107 mmol/L Final   2022 106 94 - 109 mmol/L Final   2021 107 94 - 109 mmol/L Final     Carbon Dioxide   Date Value Ref Range Status   2021 32 20 - 32 mmol/L Final     Carbon Dioxide (CO2)   Date Value Ref Range Status   2024 24 22 - 29 mmol/L " Final   05/26/2022 29 20 - 32 mmol/L Final     Anion Gap   Date Value Ref Range Status   07/08/2024 8 7 - 15 mmol/L Final   05/26/2022 4 3 - 14 mmol/L Final   05/17/2021 3 3 - 14 mmol/L Final     Glucose   Date Value Ref Range Status   07/08/2024 93 70 - 99 mg/dL Final   06/01/2022 137 (H) 70 - 99 mg/dL Final   05/17/2021 78 70 - 99 mg/dL Final     Comment:     Fasting specimen     Urea Nitrogen   Date Value Ref Range Status   07/08/2024 12.9 8.0 - 23.0 mg/dL Final   05/26/2022 17 7 - 30 mg/dL Final   05/17/2021 13 7 - 30 mg/dL Final     Creatinine   Date Value Ref Range Status   07/08/2024 0.64 0.51 - 0.95 mg/dL Final   05/17/2021 0.75 0.52 - 1.04 mg/dL Final     GFR Estimate   Date Value Ref Range Status   07/08/2024 >90 >60 mL/min/1.73m2 Final     Comment:     eGFR calculated using 2021 CKD-EPI equation.   05/17/2021 81 >60 mL/min/[1.73_m2] Final     Comment:     Non  GFR Calc  Starting 12/18/2018, serum creatinine based estimated GFR (eGFR) will be   calculated using the Chronic Kidney Disease Epidemiology Collaboration   (CKD-EPI) equation.       Calcium   Date Value Ref Range Status   07/08/2024 9.5 8.8 - 10.2 mg/dL Final   05/17/2021 9.3 8.5 - 10.1 mg/dL Final     Bilirubin Total   Date Value Ref Range Status   10/11/2023 0.4 <=1.2 mg/dL Final   05/17/2021 0.4 0.2 - 1.3 mg/dL Final     Alkaline Phosphatase   Date Value Ref Range Status   12/21/2023 68 40 - 150 U/L Final     Comment:     Reference intervals for this test were updated on 11/14/2023 to more accurately reflect our healthy population. There may be differences in the flagging of prior results with similar values performed with this method. Interpretation of those prior results can be made in the context of the updated reference intervals.   05/17/2021 88 40 - 150 U/L Final     ALT   Date Value Ref Range Status   10/11/2023 19 0 - 50 U/L Final     Comment:     Reference intervals for this test were updated on 6/12/2023 to more  accurately reflect our healthy population. There may be differences in the flagging of prior results with similar values performed with this method. Interpretation of those prior results can be made in the context of the updated reference intervals.     05/17/2021 26 0 - 50 U/L Final     AST   Date Value Ref Range Status   10/11/2023 33 0 - 45 U/L Final     Comment:     Reference intervals for this test were updated on 6/12/2023 to more accurately reflect our healthy population. There may be differences in the flagging of prior results with similar values performed with this method. Interpretation of those prior results can be made in the context of the updated reference intervals.   05/17/2021 20 0 - 45 U/L Final       PATHOLOGY:  None new.    IMAGING:  Reviewed as per A/P.    ASSESSMENT/PLAN:  Angeli Jacobson is a 74 year old female with the following issues:  1. Pathologic prognostic stage IA, [id number], grade 2 invasive ductal carcinoma of the right upper outer breast, ER positive (%), TX positive (70%), HER2/compa FISH negative  -Angeli is s/p right lumpectomy and radiation completed 3/21/2023.    --She has no clinical evidence for recurrent breast cancer by physical exam.  --I advised a total of 5 years of hormone blockade therapy.  --Due to increase in mental and physical fatigue, she switched from anastrozole to letrozole on 8/30/2023.  --She is tolerating letrozole relatively well so far with some right thumb joint pain alleviated with steroid injection.  --Tamoxifen would be less optimal given concern for increased risk of blood clots.  --I answered questions she had regarding hair thinning on AI therapy.  --Due for next annual bilateral mammogram for 11/2024.    2.  History of myxoid liposarcoma of left thigh  - Status post excision and radiation completed in 2000.  She did not receive chemotherapy for her liposarcoma.  This has resulted in left lower extremity lymphedema.  - Continue  "lymphedema therapy and compression stockings.    3.  Peripheral arterial disease  -Has history of left lower extremity peripheral arterial disease and is status post femoral-popliteal bypass with later stent to bypass.  She is on aspirin and Plavix.    4. Osteoporosis  --History of taking alendronate, off for past year.  - DEXA scan from 12/22/2022 and 12/27/2023 showed osteoporosis. Machines were not the same and therefore results not comparable.  - Continue adequate calcium and vitamin D, denosumab/Prolia, next due today.  --May need repeat DEXA end of this year to compare to prior to ensure Prolia is improving her bone density.  --She has a follow-up with endocrinology soon.    Return in 6 months.    Emilia Angulo MD  [facility] Hematology/Oncology     Total time spent today: 30 minutes in chart review, patient evaluation, counseling, documentation, test and/or medication/prescription orders, and coordination of care.     The longitudinal plan of care for the diagnosis(es)/condition(s) as documented were addressed during this visit. Due to the added complexity in care, I will continue to support Angeli in the subsequent management and with ongoing continuity of care.      Oncology Rooming Note    July 24, 2024 10:48 AM   Angeli PARTH Jacobson is a 74 year old female who presents for:    Chief Complaint   Patient presents with     Oncology Clinic Visit     Initial Vitals: LMP  (LMP Unknown)  Estimated body mass index is 26.84 kg/m  as calculated from the following:    Height as of 7/23/24: 1.638 m (5' 4.5\").    Weight as of 7/23/24: 72 kg (158 lb 12.8 oz). There is no height or weight on file to calculate BSA.  Data Unavailable Comment: Data Unavailable   No LMP recorded (lmp unknown). Patient is postmenopausal.  Allergies reviewed: Yes  Medications reviewed: Yes    Medications: Medication refills not needed today.  Pharmacy name entered into [employer]:    [facility] [id number] [place] [facility] - [place], [place] - [address] [id number] - [place] [address] [address]    Frailty Screening:   Is the patient here for a new oncology consult visit in cancer care? 2. No          Bharti Moreland MA              Again, thank you for allowing me to participate in the care of your patient.        Sincerely,        Emilia Angulo MD

## 2024-07-24 NOTE — PROGRESS NOTES
"Oncology Rooming Note    July 24, 2024 10:48 AM   Angeli PARTH Jacobson is a 74 year old female who presents for:    Chief Complaint   Patient presents with    Oncology Clinic Visit     Initial Vitals: LMP  (LMP Unknown)  Estimated body mass index is 26.84 kg/m  as calculated from the following:    Height as of 7/23/24: 1.638 m (5' 4.5\").    Weight as of 7/23/24: 72 kg (158 lb 12.8 oz). There is no height or weight on file to calculate BSA.  Data Unavailable Comment: Data Unavailable   No LMP recorded (lmp unknown). Patient is postmenopausal.  Allergies reviewed: Yes  Medications reviewed: Yes    Medications: Medication refills not needed today.  Pharmacy name entered into [employer]:    [facility] [id number] [place], [place] - [address] [id number], [place] - [address]    Frailty Screening:   Is the patient here for a new oncology consult visit in cancer care? 2. No          Bharti Moreland MA            "

## 2024-07-31 ENCOUNTER — OFFICE VISIT (OUTPATIENT)
Dept: ENDOCRINOLOGY | Facility: CLINIC | Age: 74
End: 2024-07-31
Payer: COMMERCIAL

## 2024-07-31 VITALS
WEIGHT: 157.4 LBS | OXYGEN SATURATION: 94 % | BODY MASS INDEX: 26.6 KG/M2 | DIASTOLIC BLOOD PRESSURE: 76 MMHG | SYSTOLIC BLOOD PRESSURE: 130 MMHG | HEART RATE: 72 BPM

## 2024-07-31 DIAGNOSIS — E21.3 HYPERPARATHYROIDISM (H): Primary | ICD-10-CM

## 2024-07-31 PROCEDURE — 99215 OFFICE O/P EST HI 40 MIN: CPT | Performed by: INTERNAL MEDICINE

## 2024-07-31 PROCEDURE — G2211 COMPLEX E/M VISIT ADD ON: HCPCS | Performed by: INTERNAL MEDICINE
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
## 2024-07-31 NOTE — PATIENT INSTRUCTIONS
## Osteoporosis on Fosamax 4511-4640, restarted Fosamax 8167-8495, on Prolia 1/2023  ## H/o liposarcoma s/p radiation 2000  ## H/o breast cancer, on Letrozole  ## Multiple fragility Fx 2020 tibia, 2022 sacrum  ## Possible primary hyperparathyroidism  ## Thyroid nodules  Labs and imaging are not definite for primary hyperparathyroidism.   Currently normal serum and 24 h urine calcium is borderline 200-300.  Blood calcium level is normal, normal vitamin D and kidney function.   Parathyroid hormone level is mildly elevated.  Ultrasound shows possible parathyroid nodules. DDx thyroid nodules.  Parathyroid scan does not show parathyroid adenoma  Most recent bone density test 12/2023 lowest T-score -2.5 at at hip, unable to compare to prior scan  -- continue Prolia for now, needed to be every 6 and no longer than 7 months (otherwise, can have rebound bone loss and causes multiple spine fractures)  -- continue calcium and vitamin D  -- labs and DXA in Union Grove 12/2024    Follow up 1/2025
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
## 2024-08-06 ENCOUNTER — DOCUMENTATION ONLY (OUTPATIENT)
Dept: SLEEP MEDICINE | Facility: CLINIC | Age: 74
End: 2024-08-06
Payer: COMMERCIAL

## 2024-08-06 NOTE — PROGRESS NOTES
14 day Sleep therapy management telephone visit    Diagnostic AHI:    HST: 8.9        LEFT VOICE MESSAGE FOR PATIENT TO RETURN CALL      Objective measures: 14 day rolling measures   COMPLIANCE LEAK AHI AVERAGE USE IN MINUTES   100 % 21.36 0.93 516   GOAL >70% GOAL < 24 LPM GOAL <5 GOAL >240          Device settings:  CPAP MIN CPAP MAX EPR RESMED SOFT RESPONSE SETTING   5.0 cm  H20 15.0 cm  H20 TWO OFF         Patient has the following upcoming sleep appts:  Future Sleep Appointments         Provider Department    12/20/2024 9:00 AM (Arrive by 8:45 AM) Charley Martinez PA-C [facility] [place]            Replacement device: No  STM ordered by provider: Yes     Total time spent on accessing and  interpreting remote patient PAP therapy data  10 minutes    Total time spent counseling, coaching  and reviewing PAP therapy data with patient  0 minutes

## 2024-08-09 ENCOUNTER — THERAPY VISIT (OUTPATIENT)
Dept: PHYSICAL THERAPY | Facility: CLINIC | Age: 74
End: 2024-08-09
Payer: COMMERCIAL

## 2024-08-09 DIAGNOSIS — Z17.0 MALIGNANT NEOPLASM OF UPPER-OUTER QUADRANT OF RIGHT BREAST IN FEMALE, ESTROGEN RECEPTOR POSITIVE (H): ICD-10-CM

## 2024-08-09 DIAGNOSIS — R53.1 DECREASED STRENGTH: ICD-10-CM

## 2024-08-09 DIAGNOSIS — C50.411 MALIGNANT NEOPLASM OF UPPER-OUTER QUADRANT OF RIGHT BREAST IN FEMALE, ESTROGEN RECEPTOR POSITIVE (H): ICD-10-CM

## 2024-08-09 DIAGNOSIS — L90.5 SCAR CONDITION AND FIBROSIS OF SKIN: ICD-10-CM

## 2024-08-09 DIAGNOSIS — I89.0 LYMPHEDEMA OF LEFT LEG: Primary | ICD-10-CM

## 2024-08-09 DIAGNOSIS — Z85.831 HISTORY OF SARCOMA OF SOFT TISSUE: ICD-10-CM

## 2024-08-09 DIAGNOSIS — M25.60 DECREASED RANGE OF MOTION: ICD-10-CM

## 2024-08-09 PROCEDURE — 97110 THERAPEUTIC EXERCISES: CPT | Mod: GP | Performed by: PHYSICAL THERAPIST

## 2024-08-09 PROCEDURE — 97140 MANUAL THERAPY 1/> REGIONS: CPT | Mod: GP | Performed by: PHYSICAL THERAPIST

## 2024-08-09 NOTE — PROGRESS NOTES
08/09/24 0500   Appointment Info   Signing clinician's name / credentials Mariana Guzman, MPT, ALLIE   Total/Authorized Visits [facility] [employer] ADvantage   Visits Used 4   Medical Diagnosis Lymphedema L LE; h/o sarcoma of soft tissue   PT Tx Diagnosis lymphedema, fibrosis, decreased endurance, antalgic gait, decreased strength, balance   Precautions/Limitations osteoporosis   Other pertinent information 12/14/2022 s/p R lumpectomy and SLND -2/2 with recommended 5years hormone blocker Anastrozole; h/o  LLE peripheral artery disease s/p femoral-popliteal bypass along w/ stent to the bypass later, on DAPT, 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; Limited exercises during April 2024 due to [employer] cruise x 3 weeks; , pt reporting received new compression thigh high class 3 but c/o too tight and resulting in L lower leg with wrosening swellling by end of day and discomfort and opted to alterante with older stockings. Did trial GCB increased 2x/week and compression pump 3x/week and self MLD with some improving symptoms but does not feel back to baseline.   Progress Note/Certification   Start of Care Date 06/10/24   Onset of illness/injury or Date of Surgery 05/01/24  (exacerbation of L LE edema following 3 week [employer] cruise)   Therapy Frequency 1x/month   Predicted Duration 90 days   Certification date from 08/09/24   Certification date to 11/06/24   Progress Note Completed Date 08/09/24   PT Goal 1   Goal Identifier Home program   Goal Description Patient will be independent in resuming prior  home program to manage conditions of lymphedema and fibrosis.   Rationale to maximize safety and independence with performance of ADLs and functional tasks;to maximize safety and independence within the  home;to maximize safety and independence within the community;to maximize safety and independence with self cares   Target Date 11/06/24   PT Goal 2   Goal Identifier volume L LE   Goal Description Patient to demonstrate decreased volume of L LE > 5% nearing baseline from 3/18/2024 to decrease risk of infection   Rationale to maximize safety and independence with performance of ADLs and functional tasks;to maximize safety and independence with self cares   Goal Progress eval: L LE to 40cm: 3076ml (increased 8.5% from 3/18/2024; L>R= 48%   Target Date 08/09/24   Subjective Report   Subjective Report arrived in GCB L LE since last night and wearing new compression sock during day   Objective Measure 1   Objective Measure Edema / fibrosis   Details moderate rubbery fibrosis throughout L LE especially L lower leg and knee but noting imrpoving tissue texture underneath swell spot fibrotic pads   Objective Measure 2   Objective Measure volume   Details to 40cm: L = decreased 10.5% from eval; L?R = 33%   Objective Measure 3   Objective Measure L  knee flexion   Details 84   Objective Measure 4   Objective Measure Home Lymphedema program   Details planning to resume Home lymphedema program : using Flexitouch pump M,W,F and GCB 2-3x/week and alternating new class 3 custom thigh high with older custom class 3 thigh high, LE edema ex 1x/week, self massage 1x/week.   Objective Measure 5   Objective Measure compression garments   Details new custom class 3 Mediven 550 open toe 5/2024   Therapeutic Procedure/Exercise   Therapeutic Procedures: strength, endurance, ROM, flexibility minutes (62632) 10   Ther Proc 1 LE Edema Exercises, aerobic, stretching, strengthening   Skilled Intervention forgot to trial recumant bike. further educated in benefit of positioning seat further back to allow ability to use as part of aerobic ex and L LE stretching/strengtheing, continued instruction in seated L knee flexion stretch daily and in  continued core and LE stretngthening and edema exs   Patient Response/Progress verbalized understanding   Manual Therapy   Manual Therapy: Mobilization, MFR, MLD, friction massage minutes (30402) 45   Manual Therapy 1 volume; self lymphatic draiange, compression pump, GCB, garments   Skilled Intervention assessing lymphedema status and continued instruction in modificiation of home program to optimize outcomes   Patient Response/Progress good volume reduction and softening of fibrosis   Manual Therapy 1 - Details assessed patient applied GCB L LE noting good gradient compression and good softening under bandages . volume assessed with details in objective measures.  norbert instructin pt in transitiong back to modifed home program and educating in details for garments, GCB, pump, self massage and place MD order for fibrotic softening unit in which option for GCB overtop 1x/week for increased ease and additional softening of fibrosis.   Education   Learner/Method Patient   Education Comments updates to HEP   Plan   Home program 1) Check lower leg pitting first thing in am and again when garments/ bandaging removed to assist in assessing how well working  2) Option to resume either daytime only bandaging or 24 hours 2-3 x/week to reduce L leg swelling and fibrosis  3) Then resume use of compression stockings during non GCB times daily   4) Compression pump 3x/week  5) Self massage and edema exs 1x/week   6) May consider ordering L thigh high Tribute or JOve for additonal softening of fibrosis with power sleeve or option for modified GCB overtop 7) Arrive to next session in bandaging x 24 hours. and bring all garments and folder   Updates to plan of care goal 2 met progressing with updated HEP   Plan for next session plan to see in 2-3 months to assess effectivness of resuming prior Home program; ;  consider thigh high John or Tribute garment; assess GCB written instructions to assure optimal gradient compression, also  instructing in use of swell spot of calf; consider volume and /or MLD/STM   Comments   Comments self referred but has seen Dr. Leeann Sharma in past   Total Session Time   Timed Code Treatment Minutes 55   Total Treatment Time (sum of timed and untimed services) 55         [facility]                                                                                   OUTPATIENT PHYSICAL THERAPY    PLAN OF TREATMENT FOR OUTPATIENT REHABILITATION   Patient's Last Name, First Name, Angeli Pond YOB: 1950   Provider's Name   [facility]   Medical Record No.  [id number]     Onset Date: 05/01/24 (exacerbation of L LE edema following 3 week [place] cruise)  Start of Care Date: 06/10/24     Medical Diagnosis:  Lymphedema L LE; h/o sarcoma of soft tissue      PT Treatment Diagnosis:  lymphedema, fibrosis, decreased endurance, antalgic gait, decreased strength, balance Plan of Treatment  Frequency/Duration: 1x/month/ 90 days    Certification date from 08/09/24 to 11/06/24         See note for plan of treatment details and functional goals     Mariana Guzman, PT                         I CERTIFY THE NEED FOR THESE SERVICES FURNISHED UNDER        THIS PLAN OF TREATMENT AND WHILE UNDER MY CARE     (Physician attestation of this document indicates review and certification of the therapy plan).              Referring Provider:  Leeann Sharma    Initial Assessment  See Epic Evaluation- Start of Care Date: 06/10/24            PLAN  Continue therapy per current plan of care.    Beginning/End Dates of Progress Note Reporting Period:  08/09/24 to 11/6/2024    Referring Provider:  Leeann Sharma

## 2024-08-15 ENCOUNTER — OFFICE VISIT (OUTPATIENT)
Dept: UROLOGY | Facility: CLINIC | Age: 74
End: 2024-08-15
Payer: COMMERCIAL

## 2024-08-15 ENCOUNTER — TELEPHONE (OUTPATIENT)
Dept: OTHER | Facility: CLINIC | Age: 74
End: 2024-08-15

## 2024-08-15 VITALS
HEIGHT: 66 IN | BODY MASS INDEX: 25.07 KG/M2 | SYSTOLIC BLOOD PRESSURE: 113 MMHG | HEART RATE: 76 BPM | WEIGHT: 156 LBS | DIASTOLIC BLOOD PRESSURE: 73 MMHG

## 2024-08-15 DIAGNOSIS — N32.81 OAB (OVERACTIVE BLADDER): Primary | ICD-10-CM

## 2024-08-15 LAB — RESIDUAL VOLUME (RV) (EXTERNAL): 247

## 2024-08-15 PROCEDURE — 99213 OFFICE O/P EST LOW 20 MIN: CPT | Mod: 25 | Performed by: PHYSICIAN ASSISTANT

## 2024-08-15 PROCEDURE — 51798 US URINE CAPACITY MEASURE: CPT | Performed by: PHYSICIAN ASSISTANT

## 2024-08-15 RX ORDER — SOLIFENACIN SUCCINATE 5 MG/1
5 TABLET, FILM COATED ORAL DAILY
Qty: 90 TABLET | Refills: 3 | Status: SHIPPED | OUTPATIENT
Start: 2024-08-15

## 2024-08-15 ASSESSMENT — ENCOUNTER SYMPTOMS
HEMATURIA: 0
DYSURIA: 0
SHORTNESS OF BREATH: 0
FEVER: 0
CHILLS: 0
VOMITING: 0
NAUSEA: 0
FREQUENCY: 1

## 2024-08-15 ASSESSMENT — PAIN SCALES - GENERAL: PAINLEVEL: NO PAIN (0)

## 2024-08-15 NOTE — TELEPHONE ENCOUNTER
[facility]    Who is the name of the provider?:  MACEY PRINCE   What is the location you see this provider at/preferred location?: [place]  Person calling / Facility: Angelinydia Jacobson  Phone number:  [phone] (home)   Nurse call back needed:  Yes or route to scheduling to call     Reason for call:  Please enter fasting lab orders as needed for Dr Prince. Lab appt is scheduled for tomorrow morning.    Pharmacy location:     [facility] [address] [place] [facility] [address] [facility] [address] [place] [address] [address]  Outside Imaging: n/a   Can we leave a detailed message on this number?  YES     8/15/2024, 11:29 AM

## 2024-08-15 NOTE — TELEPHONE ENCOUNTER
LOV 5/3/24 with Dr. Prince.  Patient was to have fasting labs and follow up in 3 months.  Patient completed the labs ordered by Dr. Prince 3 weeks ago on 7/24/24. There are no additional labs to be completed. Please call patient and cancel lab appointment.    Vika LARA, RN    [facility]  [facility]  Office: [phone]  Fax: [phone]

## 2024-08-15 NOTE — PROGRESS NOTES
Subjective      CHIEF COMPLAINT/REASON FOR VISIT   Patient of Dr. Oquendo's seen on my calendar  Follow up on OAB    HISTORY OF PRESENT ILLNESS   Ms. Jacobson is very pleasant 74 year old year old female, who presents today for follow-up on overactivity of the bladder.  I last saw her on 07/18/2023.  She previously followed with Dr. Oquendo.  She also has gone to pelvic floor physical therapy for pelvic floor dysfunction.    Patient has trialed Toviaz, oxybutynin, Myrbetriq.  She has been on Vesicare 5 mg daily for a while and symptoms are much better managed on this.  At her last visit, we discussed increasing this to 10 mg daily, and election was eventually made to hold off.    Patient feels like her symptoms are relatively well-managed.  She still has some mild urgency and frequency.  Denies any hematuria, dysuria recurrent UTI symptoms.  She did feel like she empties her bladder today, postvoid residual was elevated at 247 mL.  Her last postvoid residual was 72 mL.  Patient does note double and even triple voiding in the morning.  She has nocturia typically 0-1 times.  She will find herself double voiding with a more modest Such as needing to go when she gets home from here.    The following portions of the patient's history were reviewed and updated as appropriate: allergies, current medications, past family history, past medical history, past social history, past surgical history, and problem list.     REVIEW OF SYSTEMS   Review of Systems   Constitutional:  Negative for chills and fever.   Respiratory:  Negative for shortness of breath.    Cardiovascular:  Negative for chest pain.   Gastrointestinal:  Negative for nausea and vomiting.   Genitourinary:  Positive for frequency and urgency. Negative for dysuria and hematuria.      Per HPI.     Patient Active Problem List   Diagnosis    MULTINODUL GOITER    Hearing loss    Hyperlipidemia LDL goal <70    Osteoporosis    Impaired fasting glucose    Lymphedema of left leg     Tubular adenoma    Vertigo    Myxoid liposarcoma (HCC)    PAD (peripheral artery disease) (H24)    Vascular graft occlusion (H24)    Femoral-popliteal bypass graft occlusion, left (H24)    History of sarcoma    S/P ORIF (open reduction internal fixation) fracture    Hip pain, left    Postural urinary incontinence    Personal history of urinary tract infection    OAB (overactive bladder)    Myalgia of pelvic floor    Lesion of bladder    S/P total hip arthroplasty    Acute pain of left knee    Closed fracture of left tibia and fibula, initial encounter    Closed displaced fracture of left patella, unspecified fracture morphology, initial encounter    Other specified injury of left quadriceps muscle, fascia and tendon, initial encounter    Elevated coronary artery calcium score=7/1/21     Arthrofibrosis of knee joint, left    Hypothyroidism, unspecified type    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 , 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 - s/p lumpectomy + radiation    Left knee pain    Long term current use of aromatase inhibitor    ALEXANDER (obstructive sleep apnea)      Past Medical History:   Diagnosis Date    * * * SBE PROPHYLAXIS * * * 1998    Amox 500mg, take 4 tabs one hour prior to procedure.Takes this because of lymphedema secondary from leg surgey    Antiplatelet or antithrombotic long-term use     Arrhythmia     Central serous retinopathy 2001    Resolved 9/2001    CHRONIC NECK PAIN 1995    Depressive disorder     Depressive disorder, not elsewhere classified 2001    Elevated coronary artery calcium score=7/1/21 08/03/2021    Elevated coronary artery calcium score=7/1/21 08/03/2021    History of blood transfusion 04/2019 12/2020    during left hip pinning, during surgery for patella    Lateral epicondylitis     MIXED HYPERLIPIDEMIA, LDL GOAL <160 1998    LDL goal < 160    Motion sickness     MYXOID LIPOSARCOMA 2000    Left thigh, S/P excision, radiation  at [facility]    Myxoid liposarcoma  "([facility]) 03/08/2004    CHRONIC LEFT THIGH LYMPHEDEMA    Nontoxic multinodular goiter 2005    needs yearly US    ALEXANDER (obstructive sleep apnea) 7/3/2024    Osteoporosis, unspecified 2001    Other chronic pain     fx ribs left     Other lymphedema 2000    left thigh, gets regular PT for this    Overactive bladder     PAD (peripheral artery disease) (H24) 04/20/2018    PONV (postoperative nausea and vomiting)     SHINGLES 2001    Sprain of lumbosacral (joint) (ligament) 1995    right    Unspecified hearing loss 1998    chronic tinnitus    Unspecified tinnitus 1998        Objective      PHYSICAL EXAM   /73   Pulse 76   Ht 1.664 m (5' 5.5\")   Wt 70.8 kg (156 lb)   LMP  (LMP Unknown)   BMI 25.56 kg/m     Physical Exam  Constitutional:       Appearance: Normal appearance.   HENT:      Head: Normocephalic.   Eyes:      General: No scleral icterus.  Pulmonary:      Effort: Pulmonary effort is normal.   Musculoskeletal:      Comments: Ambulates with a cane.   Skin:     Findings: No rash.   Neurological:      General: No focal deficit present.      Mental Status: She is alert and oriented to person, place, and time.   Psychiatric:         Mood and Affect: Mood normal.         Behavior: Behavior normal.       TESTING    PVR: 247 mL    Assessment & Plan    1. OAB (overactive bladder)        I had the pleasure today of meeting with Ms. Jacobson to discuss her follow-up for overactivity of the bladder.  She is feeling like her symptoms are relatively under control.  At times, she will have symptoms of double voiding or triple voiding in the morning.  Her postvoid residual is a fair amount higher than previously.  She does endorse having drink a large amount of water right before she came.  We discussed a conservative reevaluation of this would be for a nurse visit if in approximately a month with rechecking a postvoid residual.    We also discussed ways to help her empty better such as intentionally double voiding and " shifting positions when urinating.    Will plan on the following:    -Continue with Vesicare 5 mg once daily.  Refills sent to the pharmacy for 1 year.     -Next option for urinary symptoms would be posterior tibial nerve stimulation or possible Botox.     -Would recommend that patient start intentionally double voiding and position changes with voiding, as post void was quite a bit higher than last year.      -Plan on follow up nurse visit for post void in 1 month to ensure that it is not increasing.  If improved, follow up in 1 year for med check and post void residual.     Contact us in the interim with questions, concerns, or changes in symptomatology.    Signed by:       Nancy Chawla PA-C 8/15/2024 1:30 PM

## 2024-08-15 NOTE — PATIENT INSTRUCTIONS
Continue with Vesicare 5 mg once daily.  Refills sent to the pharmacy for 1 year.    You have been prescribed medication to help relax your bladder.     This medication may help control (or improve) urinary frequency, urgency and urge incontinence.     Common side effects include:  Dry mouth (Biotene mouthwash can help with this.)  Constipation  Drowsiness/Mental Fogginess  Blurred vision/Dry Eyes  Difficulty with sweating     Rare side effect:  Urinary retention      Next option for urinary symptoms would be posterior tibial nerve stimulation or possible Botox.     Would recommend that you start intentionally double voiding and position changes with voiding, as your post void was quite a bit higher than last year.      Plan on follow up nurse visit for post void in 1 month to ensure that it is not increasing.  If improved, follow up in 1 year for med check and post void residual.     Contact us in the interim with questions, concerns, or changes in symptomatology.  [phone]

## 2024-08-15 NOTE — NURSING NOTE
Chief Complaint   Patient presents with    OAB     Pt here for 1 year follow up      Pt states she is doing well. Pt states she feels like her bladder is emptying unless she switches positions and urine leaks out.    PVR: 247 mL    Angelina Wagner, CMA

## 2024-08-22 ENCOUNTER — DOCUMENTATION ONLY (OUTPATIENT)
Dept: HOME HEALTH SERVICES | Facility: CLINIC | Age: 74
End: 2024-08-22

## 2024-08-22 ENCOUNTER — VIRTUAL VISIT (OUTPATIENT)
Dept: OTHER | Facility: CLINIC | Age: 74
End: 2024-08-22
Attending: INTERNAL MEDICINE
Payer: COMMERCIAL

## 2024-08-22 DIAGNOSIS — E03.9 HYPOTHYROIDISM, UNSPECIFIED TYPE: ICD-10-CM

## 2024-08-22 DIAGNOSIS — I73.9 PAD (PERIPHERAL ARTERY DISEASE) (H): ICD-10-CM

## 2024-08-22 DIAGNOSIS — Z95.828 HISTORY OF ARTERIAL BYPASS OF LOWER EXTREMITY: ICD-10-CM

## 2024-08-22 DIAGNOSIS — E78.5 HYPERLIPIDEMIA LDL GOAL <70: ICD-10-CM

## 2024-08-22 DIAGNOSIS — I89.0 LYMPHEDEMA OF LEFT LEG: Primary | ICD-10-CM

## 2024-08-22 PROCEDURE — 99443 PR PHYSICIAN TELEPHONE EVALUATION 21-30 MIN: CPT | Mod: 93 | Performed by: INTERNAL MEDICINE

## 2024-08-22 NOTE — PROGRESS NOTES
Angeli is a 74 year old who is being evaluated via a billable telephone visit.    What phone number would you like to be contacted at?   [phone]        How would you like to obtain your AVS? Nica Bowser MA      Provider visit note:    Follow-up visit  Review of recent labs and previous imaging studies   Med refills   Tolerating crestor, Zetia and PCSK 9 inhibitor  Lipids well-controlled   CMP and thyroid function tests are good range    She is able to walk half a mile without any problems  History of left lower extremity arterial bypass for PAD  She broke her left hip in April 2019 while in [place] underwent ORIF followed by left total hip replacement on October 4, 2019 at [facility]   History of breast cancer underwent Sx and XRT seeing Oncologist     History of present illness:   Angeli Jacobson is a 74 year old very pleasant female with past medical history of left thigh/groin sarcoma underwent surgery with lymphadenectomy and radiation therapy in the year 2000 at [facility] followed by she developed peripheral arterial disease underwent  Redo left common femoral to a bony popliteal artery bypass with 6 mm PTFE graft in January 2019 and since then reasonably doing well as for as the PAD concerned visited [place] in April and she fell down broke her left hip underwent ORIF over there.   She has a long-standing history of lymphedema after sarcoma surgery and radiation therapy.  Using compression stockings etc..  She was also evaluated extensively at lymphedema clinic and currently using pump  On October 4, 2019 she underwent left total hip replacement .  She has been taking both Plavix and low-dose Xarelto 2.5 mg twice a day      Review of systems: Reviewed all 12 point review of systems as per HPI otherwise unremarkable    Physical exam:( no physical exam done this is virtual visit)    Reviewed recent laboratory tests, imaging studies in the epic and  updated chart  US ADRIAN DOPPLER WITH EXERCISE BILATERAL   5/15/2024 1:12 PM      HISTORY: History of left femoral to above knee popliteal with PTFE  bypass; History of arterial bypass of lower extremity; History of  femoropopliteal bypass; PAD (peripheral artery disease) (H24).     COMPARISON: 3/23/2023     FINDINGS:  Right ADRIAN:   PT: 1.22.  DP: 1.17     Left ADRIAN:   PT: 1.1.  DP: 1.11      Waveforms: Triphasic bilaterally.     Exercise: The patient was exercised on a treadmill at 1.5 mph at a 10%  incline for 5 minutes total. No symptoms with exercise.     Right exercise ADRIAN: 1.22.  Left exercise ADRIAN: 1.09                                                                      IMPRESSION: Normal resting and exercise ABIs bilaterally without  evidence of arterial insufficiency.     ADRIAN CRITERIA:  >1.4 NC  0.95-1.4 Normal  0.90 - 0.94 Mild  0.5 - 0.89 Moderate  0.2 - 0.49 Severe  <0.2 Critical     DO DIMA OCONNOR LOWER EXTREMITY ARTERIAL DUPLEX LEFT  5/15/2024 1:12 PM      HISTORY: The patient is status post a left femoral to above-knee  popliteal artery PTFE bypass graft.     COMPARISON: 5/12/2023     FINDINGS: Color Doppler and spectral waveform analysis performed.     The left femoral to above-knee popliteal artery PTFE bypass graft is  patent without sonographic evidence for a significant stenosis.  Multiphasic waveforms are noted throughout.     ADILIA MUNROE MD        Assessment and plan:    1. Hyperlipidemia LDL goal <70  Well-controlled with Repatha, Zetia and Crestor continue the same repeat fasting lipids, CMP labs in 6 months then virtual visit    2. Lymphedema of left leg  She was seen and evaluated recently lymphedema therapist continue the same plan elevate the leg,  lose weight use compression stockings and pump therapy    3. History of arterial bypass of Left lower extremity  Currently taking Plavix and low-dose Xarelto 2.5 mg twice a day tolerating without any problems and she is tolerating  crestor 20 mg daily along with Zetia 10 mg daily and PCSK 9 inhibitor   She is able to walk half a mile without any problems  Refilled xarelto      4. Hypothyroidism, unspecified type  Clinically euthyroid and recent thyroid function tests are normal continue the same and take medication as advised  Repeat thyroid panel in 6 months order placed    Phone visit start time: 8:50 AM  Phone visit end time: 9:12 AM  Location of the patient; at her home  Location the provider: [facility]    21 minutes spent on the date of the encounter doing chart review, review of recent imaging studies, laboratory test, history, documentation and addressed above-mentioned issues medications refilled lab orders placed    AVS with written instructions done      This visit is being conducted as a virtual visit due to the emphasis on mitigation of the COVID-19 virus pandemic. The clinician has decided that the risk of an in-office visit outweighs the benefit for this patient.     Tiesha Prince MD, JASON, FSGARRY, FNLA  Vascular Medicine  Clinical Lipidologist

## 2024-08-22 NOTE — PROGRESS NOTES
Patient left message at [facility]. I returned her call and spoke with her regarding her ramp pressure.  She wasn't sure how her pressures work and what ramp is exactly. I discussed ramp and that her ramp is set to 5 cm H20 and her pressures rx is 5-15 cm H2O so therefore she will not notice a difference in ramp vs an event triggered pressure. I explained how her pressures titrate auto during the night so if 5cm h20 dosesn't open airway it will continue to force more pressure up to 15 cm h20 until her airway is open and she begins to breathe again. I let her know that i can adjust the ramp to start at 4cm h20 to see if that helps her. We did make this change to ramp of 4 cm h20 remotely today. She also asked about her events per hour and we discussed the past 30 days of data show an ahi of 1.0. This is fantastic. She said her report shows that some nights she has as his as 5 events per hour. I let her know this can be normal as we all have bad nights sleep but that the last 30 days of data show an average of 1.0.

## 2024-08-22 NOTE — PATIENT INSTRUCTIONS
Recent labs looks good continue current medications    Refilled xarelto     Use compression stockings      Fasting labs followed by Virtual visit in 6 months , lab orders placed

## 2024-08-27 ENCOUNTER — LAB (OUTPATIENT)
Dept: LAB | Facility: CLINIC | Age: 74
End: 2024-08-27
Payer: COMMERCIAL

## 2024-08-27 DIAGNOSIS — M81.0 AGE-RELATED OSTEOPOROSIS WITHOUT CURRENT PATHOLOGICAL FRACTURE: ICD-10-CM

## 2024-08-27 DIAGNOSIS — E11.9 DIABETES MELLITUS (H): Primary | ICD-10-CM

## 2024-08-27 LAB
ALBUMIN SERPL BCG-MCNC: 4.2 G/DL (ref 3.5–5.2)
CALCIUM SERPL-MCNC: 10.4 MG/DL (ref 8.8–10.4)
CREAT SERPL-MCNC: 0.78 MG/DL (ref 0.51–0.95)
EGFRCR SERPLBLD CKD-EPI 2021: 79 ML/MIN/1.73M2

## 2024-08-27 PROCEDURE — 36415 COLL VENOUS BLD VENIPUNCTURE: CPT

## 2024-08-27 PROCEDURE — 82040 ASSAY OF SERUM ALBUMIN: CPT

## 2024-08-27 PROCEDURE — 82565 ASSAY OF CREATININE: CPT

## 2024-08-27 PROCEDURE — 82310 ASSAY OF CALCIUM: CPT

## 2024-08-28 DIAGNOSIS — Z79.811 LONG TERM CURRENT USE OF AROMATASE INHIBITOR: ICD-10-CM

## 2024-08-28 DIAGNOSIS — M80.00XD AGE-RELATED OSTEOPOROSIS WITH CURRENT PATHOLOGICAL FRACTURE WITH ROUTINE HEALING, SUBSEQUENT ENCOUNTER: Primary | ICD-10-CM

## 2024-08-29 ENCOUNTER — ALLIED HEALTH/NURSE VISIT (OUTPATIENT)
Dept: INFUSION THERAPY | Facility: CLINIC | Age: 74
End: 2024-08-29
Attending: INTERNAL MEDICINE
Payer: COMMERCIAL

## 2024-08-29 VITALS
HEART RATE: 66 BPM | OXYGEN SATURATION: 99 % | TEMPERATURE: 98 F | RESPIRATION RATE: 18 BRPM | DIASTOLIC BLOOD PRESSURE: 72 MMHG | SYSTOLIC BLOOD PRESSURE: 134 MMHG

## 2024-08-29 DIAGNOSIS — Z79.811 LONG TERM CURRENT USE OF AROMATASE INHIBITOR: ICD-10-CM

## 2024-08-29 DIAGNOSIS — M80.00XD AGE-RELATED OSTEOPOROSIS WITH CURRENT PATHOLOGICAL FRACTURE WITH ROUTINE HEALING, SUBSEQUENT ENCOUNTER: Primary | ICD-10-CM

## 2024-08-29 PROCEDURE — 250N000011 HC RX IP 250 OP 636: Performed by: INTERNAL MEDICINE

## 2024-08-29 PROCEDURE — 96372 THER/PROPH/DIAG INJ SC/IM: CPT | Performed by: INTERNAL MEDICINE

## 2024-08-29 RX ORDER — ALBUTEROL SULFATE 0.83 MG/ML
2.5 SOLUTION RESPIRATORY (INHALATION)
OUTPATIENT
Start: 2025-02-25

## 2024-08-29 RX ORDER — ALBUTEROL SULFATE 90 UG/1
1-2 AEROSOL, METERED RESPIRATORY (INHALATION)
Start: 2025-02-25

## 2024-08-29 RX ORDER — MEPERIDINE HYDROCHLORIDE 25 MG/ML
25 INJECTION INTRAMUSCULAR; INTRAVENOUS; SUBCUTANEOUS EVERY 30 MIN PRN
OUTPATIENT
Start: 2025-02-25

## 2024-08-29 RX ORDER — EPINEPHRINE 1 MG/ML
0.3 INJECTION, SOLUTION INTRAMUSCULAR; SUBCUTANEOUS EVERY 5 MIN PRN
OUTPATIENT
Start: 2025-02-25

## 2024-08-29 RX ORDER — DIPHENHYDRAMINE HYDROCHLORIDE 50 MG/ML
50 INJECTION INTRAMUSCULAR; INTRAVENOUS
Start: 2025-02-25

## 2024-08-29 RX ORDER — METHYLPREDNISOLONE SODIUM SUCCINATE 125 MG/2ML
125 INJECTION, POWDER, LYOPHILIZED, FOR SOLUTION INTRAMUSCULAR; INTRAVENOUS
Start: 2025-02-25

## 2024-08-29 RX ADMIN — DENOSUMAB 60 MG: 60 INJECTION SUBCUTANEOUS at 11:13

## 2024-08-29 NOTE — PROGRESS NOTES
Nursing Note:  Angelinydia Jacobson presents today for Prolia.    Patient seen by provider today: No   present during visit today: Not Applicable.    Note: Tolerated one Prolia injection to left arm without issue    Labs on 8/27:  Creatinine: 0.78  Calcium: 10.4  Albumin: 4.2    Intravenous Access:  No Intravenous access/labs at this visit.    Discharge Plan:   Patient was sent to home in stable condition.    Irena Ontiveros RN

## 2024-09-12 ENCOUNTER — ALLIED HEALTH/NURSE VISIT (OUTPATIENT)
Dept: UROLOGY | Facility: CLINIC | Age: 74
End: 2024-09-12
Payer: COMMERCIAL

## 2024-09-12 DIAGNOSIS — R33.9 INCOMPLETE BLADDER EMPTYING: Primary | ICD-10-CM

## 2024-09-12 LAB — RESIDUAL VOLUME (RV) (EXTERNAL): 156

## 2024-09-12 PROCEDURE — 51798 US URINE CAPACITY MEASURE: CPT

## 2024-09-12 NOTE — PROGRESS NOTES
Angeli Jacobson comes into clinic today at the request of MAURI Copeland, Ordering Provider for PVR.    PVR: 156 mL mL by bladder scan    This service provided today was under the supervising provider of the day Dr. Taylor, who was available if needed.    Viridiana Aquino

## 2024-09-27 ENCOUNTER — LAB (OUTPATIENT)
Dept: LAB | Facility: CLINIC | Age: 74
End: 2024-09-27
Payer: COMMERCIAL

## 2024-09-27 DIAGNOSIS — I25.10 CORONARY ARTERY DISEASE INVOLVING NATIVE CORONARY ARTERY OF NATIVE HEART WITHOUT ANGINA PECTORIS: ICD-10-CM

## 2024-09-27 DIAGNOSIS — I73.9 PAD (PERIPHERAL ARTERY DISEASE) (H): ICD-10-CM

## 2024-09-27 LAB
ALT SERPL W P-5'-P-CCNC: 25 U/L (ref 0–50)
ANION GAP SERPL CALCULATED.3IONS-SCNC: 12 MMOL/L (ref 7–15)
BUN SERPL-MCNC: 15.8 MG/DL (ref 8–23)
CALCIUM SERPL-MCNC: 9.8 MG/DL (ref 8.8–10.4)
CHLORIDE SERPL-SCNC: 106 MMOL/L (ref 98–107)
CHOLEST SERPL-MCNC: 102 MG/DL
CREAT SERPL-MCNC: 0.64 MG/DL (ref 0.51–0.95)
EGFRCR SERPLBLD CKD-EPI 2021: >90 ML/MIN/1.73M2
ERYTHROCYTE [DISTWIDTH] IN BLOOD BY AUTOMATED COUNT: 13.1 % (ref 10–15)
FASTING STATUS PATIENT QL REPORTED: YES
FASTING STATUS PATIENT QL REPORTED: YES
GLUCOSE SERPL-MCNC: 92 MG/DL (ref 70–99)
HCO3 SERPL-SCNC: 25 MMOL/L (ref 22–29)
HCT VFR BLD AUTO: 43.2 % (ref 35–47)
HDLC SERPL-MCNC: 52 MG/DL
HGB BLD-MCNC: 14.1 G/DL (ref 11.7–15.7)
LDLC SERPL CALC-MCNC: 31 MG/DL
MCH RBC QN AUTO: 30 PG (ref 26.5–33)
MCHC RBC AUTO-ENTMCNC: 32.6 G/DL (ref 31.5–36.5)
MCV RBC AUTO: 92 FL (ref 78–100)
NONHDLC SERPL-MCNC: 50 MG/DL
PLATELET # BLD AUTO: 221 10E3/UL (ref 150–450)
POTASSIUM SERPL-SCNC: 4.3 MMOL/L (ref 3.4–5.3)
RBC # BLD AUTO: 4.7 10E6/UL (ref 3.8–5.2)
SODIUM SERPL-SCNC: 143 MMOL/L (ref 135–145)
TRIGL SERPL-MCNC: 95 MG/DL
WBC # BLD AUTO: 4.5 10E3/UL (ref 4–11)

## 2024-09-27 PROCEDURE — 85027 COMPLETE CBC AUTOMATED: CPT | Mod: GZ

## 2024-09-27 PROCEDURE — 36415 COLL VENOUS BLD VENIPUNCTURE: CPT

## 2024-09-27 PROCEDURE — 80061 LIPID PANEL: CPT

## 2024-09-27 PROCEDURE — 80048 BASIC METABOLIC PNL TOTAL CA: CPT

## 2024-09-27 PROCEDURE — 84460 ALANINE AMINO (ALT) (SGPT): CPT

## 2024-10-02 ENCOUNTER — THERAPY VISIT (OUTPATIENT)
Dept: PHYSICAL THERAPY | Facility: CLINIC | Age: 74
End: 2024-10-02
Payer: COMMERCIAL

## 2024-10-02 DIAGNOSIS — M79.662 PAIN OF LEFT LOWER LEG: ICD-10-CM

## 2024-10-02 DIAGNOSIS — R53.1 DECREASED STRENGTH: ICD-10-CM

## 2024-10-02 DIAGNOSIS — C50.411 MALIGNANT NEOPLASM OF UPPER-OUTER QUADRANT OF RIGHT BREAST IN FEMALE, ESTROGEN RECEPTOR POSITIVE (H): ICD-10-CM

## 2024-10-02 DIAGNOSIS — L90.5 SCAR CONDITION AND FIBROSIS OF SKIN: ICD-10-CM

## 2024-10-02 DIAGNOSIS — R53.81 PHYSICAL DECONDITIONING: ICD-10-CM

## 2024-10-02 DIAGNOSIS — Z17.0 MALIGNANT NEOPLASM OF UPPER-OUTER QUADRANT OF RIGHT BREAST IN FEMALE, ESTROGEN RECEPTOR POSITIVE (H): ICD-10-CM

## 2024-10-02 DIAGNOSIS — M25.60 DECREASED RANGE OF MOTION: ICD-10-CM

## 2024-10-02 DIAGNOSIS — I89.0 LYMPHEDEMA OF LEFT LEG: Primary | ICD-10-CM

## 2024-10-02 DIAGNOSIS — Z85.831 HISTORY OF SARCOMA OF SOFT TISSUE: ICD-10-CM

## 2024-10-02 PROCEDURE — 97110 THERAPEUTIC EXERCISES: CPT | Mod: GP | Performed by: PHYSICAL THERAPIST

## 2024-10-02 PROCEDURE — 97140 MANUAL THERAPY 1/> REGIONS: CPT | Mod: GP | Performed by: PHYSICAL THERAPIST

## 2024-10-02 NOTE — PROGRESS NOTES
10/02/24 0500   Appointment Info   Signing clinician's name / credentials Mariana Guzman, MPT, ALLIE   Total/Authorized Visits [facility] Medicare ADvantage   Visits Used 5   Medical Diagnosis Lymphedema L LE; h/o sarcoma of soft tissue   PT Tx Diagnosis lymphedema, fibrosis, decreased endurance, antalgic gait, decreased strength, balance   Precautions/Limitations osteoporosis   Other pertinent information 12/14/2022 s/p R lumpectomy and SLND -2/2 with recommended 5years hormone blocker Anastrozole; h/o  LLE peripheral artery disease s/p femoral-popliteal bypass along w/ stent to the bypass later, on DAPT, 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 Did trial GCB increased 2x/week and compression pump 3x/week and self MLD with some improving symptoms but does not feel back to baseline.   Progress Note/Certification   Start of Care Date 06/10/24   Onset of illness/injury or Date of Surgery 05/01/24  (exacerbation of L LE edema following 3 week [employer] cruise)   Therapy Frequency 1x/month   Predicted Duration 90 days   Certification date from 10/02/24   Certification date to 12/30/24   Progress Note Completed Date 08/09/24   PT Goal 1   Goal Identifier Home program   Goal Description Patient will be independent in resuming prior  home program to manage conditions of lymphedema and fibrosis.   Rationale to maximize safety and independence with performance of ADLs and functional tasks;to maximize safety and independence within the  home;to maximize safety and independence within the community;to maximize safety and independence with self cares   Target Date 12/30/24  (goal date extended as just received tribute wrap and has not trialed modified home program)   PT Goal 2   Goal Identifier volume L LE   Goal Description Patient to demonstrate decreased volume of L LE > 5% nearing baseline from 3/18/2024 to decrease risk of infection   Rationale to maximize safety and independence with performance of ADLs and functional tasks;to maximize safety and independence with self cares   Goal Progress eval: L LE to 40cm: 3076ml (increased 8.5% from 3/18/2024; L>R= 48%; 8/9/2024: L lower leg decreased 10.5%;   Target Date 08/09/24   Date Met 08/09/24   Subjective Report   Subjective Report arrived in GCB L LE x 10 hours; has been performing GCB nightly as has not yet received new night garment until yesterday   Objective Measure 1   Objective Measure Edema / fibrosis   Details moderate rubbery fibrosis throughout L LE especially L lower leg and knee but noting imrpoving tissue texture underneath swell spot fibrotic pads   Objective Measure 2   Objective Measure volume   Details L LE to 40cm = 2744ml (stable status); R LE = 2076ml (stable status); L>R = 33%   Objective Measure 3   Objective Measure L  knee flexion   Details 82 (decreased 2 degrees)   Objective Measure 4   Objective Measure Home Lymphedema program   Details Home lymphedema program : using Flexitouch pump M,W,F and GCB 2-3x/week alternating with new Tribute wrap night garment  and alternating new class 3 custom thigh high with older custom class 3 thigh high, LE edema ex 1x/week, self massage 1x/week.   Objective Measure 5   Objective Measure compression garments   Details new custom class 3 Mediven 550 open toe 5/2024 and one in 6/2024; and just received Tribute Wrap night garment.and received new class 3 open toe thigh high comrpession stocking 10/1/2024   Therapeutic Procedure/Exercise  "  Therapeutic Procedures: strength, endurance, ROM, flexibility minutes (11120) 10   Ther Proc 1 LE Edema Exercises, aerobic, stretching, strengthening   Skilled Intervention assessed L knee flexion noting slight decreased and further instructing in importance of performing increased duration and ROM x 3 reps daily as well as importance of performing GS stretching holding 15\" each position daily as noting increased L MCP callous today. instructing pt to perform HR/TR x 15 reps while in comrpession and while using recumbant bike x 10\"; and stringing together various drills, ie walking, STS, bike adding uE exs with soup cans   Patient Response/Progress verbalized understanding   Manual Therapy   Manual Therapy: Mobilization, MFR, MLD, friction massage minutes (09696) 45   Manual Therapy 1 volume; self lymphatic draiange, compression pump, GCB, garments   Manual Therapy 1 - Details assessed pt applied GCB L LE to groin with long swell spot along L lateral postieror lower leg demonstrating good fibrotic softening  and good gradient compression with limited foot involvement and limited R LE edema <1+ pretibial pitting .  volumes assessed with details in objective measures demonstrating stable L LE volume but pt has been performing GCB nightlly as had not received Tribute wrap night garment until yesterday.  instructed pt in plan to decrease frequency of GCB to every other night and use of Tribute wrap garment on alternate nights to decrease time and effort of home program managment. further instructing in STM to areas of fibrosis and benefit of continued use of comrpession pump M-W-F.  may consider trial of GCB overtop tribute wrap or unser pump at future date if indicated.   Skilled Intervention assessing lymphedema status and continued instruction in modificiation of home program to optimize outcomes;  assessed fit of new custom compresion thigh high demonstrating good gradient compression and pt reporting good fit. "   Patient Response/Progress stable volume with daily wear and comrpression thigh high and nightly GCB; good softening of fibrosis using swell spots under GCB altnerating locations   Education   Learner/Method Patient   Education Comments modification of home program   Plan   Home program 1) Check lower leg pitting first thing in am and again when garments/ bandaging removed to assist in assessing how well working  2)Daily wear of new class 3 custom compression thigh highs with open toe garments (currently alteranting most recent 3 pairs)  3)GCB L LE to groin alteranting location of swell spots with emphasis on lower leg at night extending duration into day while exercising as able and alternating with Tribute wrap night garment  4) Compression pump 3x/week  5) Self massage and edema exs 1x/week  6) Arrive to next session in bandaging x 24 hours. and bring all garments and folder   Updates to plan of care goal 2 met progressing with updated HEP   Plan for next session plan to see mid November to assess effectivness of modifed Home program with new Tribute wrap garment and exercises with either GCB or garment ; assess GCB L LE, volume (with reduced GCB), assess fibrosis, ask regarding perfomring HR/TR, STS, walking, recumbant bike while wearing either GCB or garment, assess L knee ROM and assess L ankle DF (plantar MCP callous)   Comments   Comments Dr. Leeann Sharma   Total Session Time   Timed Code Treatment Minutes 55   Total Treatment Time (sum of timed and untimed services) 55         [facility]                                                                                   OUTPATIENT PHYSICAL THERAPY    PLAN OF TREATMENT FOR OUTPATIENT REHABILITATION   Patient's Last Name, First Name, Angeli Pond YOB: 1950   Provider's Name   [facility]   Medical Record No.  [id number]     Onset Date: 05/01/24 (exacerbation of L LE  edema following 3 week [employer] cruise)  Start of Care Date: 06/10/24     Medical Diagnosis:  Lymphedema L LE; h/o sarcoma of soft tissue      PT Treatment Diagnosis:  lymphedema, fibrosis, decreased endurance, antalgic gait, decreased strength, balance Plan of Treatment  Frequency/Duration: 1x/month/ 90 days    Certification date from 10/02/24 to 12/30/24         See note for plan of treatment details and functional goals     Mariana Guzman, PT                         I CERTIFY THE NEED FOR THESE SERVICES FURNISHED UNDER        THIS PLAN OF TREATMENT AND WHILE UNDER MY CARE     (Physician attestation of this document indicates review and certification of the therapy plan).              Referring Provider:  Leeann Sharma    Initial Assessment  See Epic Evaluation- Start of Care Date: 06/10/24            PLAN  Continue therapy per current plan of care.    Beginning/End Dates of Progress Note Reporting Period:  08/09/24 to 10/02/2024    Referring Provider:  Leeann Sharma

## 2024-10-03 ENCOUNTER — OFFICE VISIT (OUTPATIENT)
Dept: CARDIOLOGY | Facility: CLINIC | Age: 74
End: 2024-10-03
Attending: INTERNAL MEDICINE
Payer: COMMERCIAL

## 2024-10-03 VITALS
WEIGHT: 160.2 LBS | HEIGHT: 66 IN | BODY MASS INDEX: 25.75 KG/M2 | HEART RATE: 66 BPM | SYSTOLIC BLOOD PRESSURE: 124 MMHG | DIASTOLIC BLOOD PRESSURE: 80 MMHG | OXYGEN SATURATION: 96 %

## 2024-10-03 DIAGNOSIS — E78.5 HYPERLIPIDEMIA LDL GOAL <70: ICD-10-CM

## 2024-10-03 DIAGNOSIS — I73.9 PAD (PERIPHERAL ARTERY DISEASE) (H): ICD-10-CM

## 2024-10-03 DIAGNOSIS — I25.10 CORONARY ARTERY DISEASE INVOLVING NATIVE CORONARY ARTERY OF NATIVE HEART WITHOUT ANGINA PECTORIS: ICD-10-CM

## 2024-10-03 PROCEDURE — 99214 OFFICE O/P EST MOD 30 MIN: CPT | Performed by: INTERNAL MEDICINE

## 2024-10-03 PROCEDURE — G2211 COMPLEX E/M VISIT ADD ON: HCPCS | Performed by: INTERNAL MEDICINE

## 2024-10-03 NOTE — PROGRESS NOTES
CARDIOLOGY CLINIC FOLLOW-UP VISIT      REASON FOR VISIT:   F/u CAD    PRIMARY CARE PHYSICIAN:  Edel Mccarty        History of Present Illness   Angeli Jacobson is an extremely pleasant 74 year old female here for routine follow-up.  She has a past medical history significant for severe coronary calcification by coronary calcium scan (CAC = 3897 in 2021), remote left thigh myxoid liposarcoma s/p resection in 2000 that resulted in removal of a portion of her femoral artery that was apparently then replaced by graft, recurrent peripheral artery disease in the same area s/p multiple peripheral vascular interventions, most recently a left femoral-popliteal bypass in 1/2019, as well as dyslipidemia and left tibial fracture in 12/2020 with several subsequent orthopedic surgeries, and chronic left lower extremity lymphedema.    I first met her in 8/2021 and she was entirely asymptomatic at the time, so we managed her medically without invasive angiography.  We began by continuing her Crestor and Zetia, but she was quite far from her LDL goal, so we initiated the approval process for PCSK9 inhibitors, and she ended up receiving a willie for coverage of Repatha.  She has done very well with this.  Her LDL has dropped from the 120-140 range, to the 50-60 range or lower.  She has not had any side effects with this.      Since her last visit in June 2023, she continues to be asymptomatic from a cardiac standpoint today, denying any chest pains, shortness of breath, lightheadedness, palpitations, or lower extremity swelling.  No major bleeding issues on the Plavix/low-dose Xarelto combination.      Her most recent labs were from 9/27/2024, showing total cholesterol 102, HDL 52, LDL 31, triglycerides 95, normal sodium and potassium, normal renal function, normal hemoglobin and normal platelets.  Her most recent ECG was done on 7/21/2021 and showed normal sinus rhythm with right bundle branch block and left  anterior fascicular block.  Her most recent echocardiogram was an exercise stress echo from 7/14/2023, where she exercised for 5 minutes on a Shay protocol (7 METS), with no ECG or echo evidence of inducible ischemia, and no significant valvular abnormalities noted on baseline echo images.  This was also her most recent ischemic evaluation.  Her coronary calcium score from 7/1/2021 is described above.      Assessment & Plan     CAD by CT scan (coronary calcium score 3897 in 7/2021), asymptomatic  Negative ELLE in 7/2023  Severe LLE PAD s/p multiple interventions, most recently femoropopliteal bypass 1/2019  Changed to low-dose Xarelto/Plavix by vascular due to possible LE clot in 2023  Familial hyperlipidemia, now well controlled with Repatha  Bifascicular block (RBBB, LAFB) in 2021 ECG  Left tibial fracture in 12/2020 s/p several orthopedic surgeries  Chronic left lower extremity lymphedema      It was a pleasure to meet with Angeli and her  again in clinic today.  I am very happy to hear that she continues to do so well from a cardiac standpoint.  Once again her blood pressure and lipids are very well-controlled, she is not having any cardiac symptoms, and she is not having any bleeding issues or side effects from her medications.  At this point I would continue all of her current medications unchanged.  We will plan to see her back in 1 year, and we will check an ECG at that time to follow-up on her bifascicular block.      -Conservative management of elevated, asymptomatic coronary calcium score for now   -Would favor invasive angiography if suggestive symptoms develop in the future  -Continue low-dose Xarelto/Plavix  -Continue Crestor 20 mg daily and Zetia 10 mg daily  -Continue Repatha 140 mg every 14 days  -ECG prior to clinic visit next year to follow bifascicular block        Follow-up: 12 months, with ECG and lab before hand, or sooner as needed      Spencer Wilder MD  Interventional  Cardiology  October 3, 2024      The longitudinal plan of care for the diagnosis(es)/condition(s) as documented were addressed during this visit. Due to the added complexity in care, I will continue to support Angeli in the subsequent management and with ongoing continuity of care.          Medications   Current Outpatient Medications   Medication Sig Dispense Refill    calcium carbonate 600 mg-vitamin D 400 units (CALTRATE) 600-400 MG-UNIT per tablet Take 1 chew tab by mouth daily      clopidogrel (PLAVIX) 75 MG tablet Take 1 tablet (75 mg) by mouth daily 90 tablet 3    denosumab (PROLIA) 60 MG/ML SOSY injection       evolocumab (REPATHA) 140 MG/ML prefilled autoinjector Inject 1 mL (140 mg) Subcutaneous every 14 days 6 mL 2    ezetimibe (ZETIA) 10 MG tablet Take 1 tablet (10 mg) by mouth daily 90 tablet 3    letrozole (FEMARA) 2.5 MG tablet Take 1 tablet (2.5 mg) by mouth daily 90 tablet 3    levothyroxine (SYNTHROID/LEVOTHROID) 75 MCG tablet Take 1 tablet (75 mcg) by mouth daily 90 tablet 3    magnesium oxide 200 MG TABS       multivitamin, therapeutic (THERA-VIT) TABS tablet Take 1 tablet by mouth daily      nitroFURantoin macrocrystal-monohydrate (MACROBID) 100 MG capsule Take 1 capsule after intercourse 30 capsule 0    rivaroxaban ANTICOAGULANT (XARELTO) 2.5 MG TABS tablet Take 1 tablet (2.5 mg) by mouth 2 times daily. 180 tablet 3    rosuvastatin (CRESTOR) 20 MG tablet Take 1 tablet (20 mg) by mouth daily 90 tablet 3    Sharps Container MISC Use as per  instructions for safe needle disposal 1 each 1    solifenacin (VESICARE) 5 MG tablet Take 1 tablet (5 mg) by mouth daily 90 tablet 3     Current Facility-Administered Medications   Medication Dose Route Frequency Provider Last Rate Last Admin    0.5 mL ropivacaine (NAROPIN) injection 5 mg/mL  0.5 mL   Saturnino Quinn PA-C   0.5 mL at 10/11/23 0914    1 mL ropivacaine (NAROPIN) injection 5 mg/mL  1 mL      1 mL at 05/28/24 0904     betamethasone acet & sod phos (CELESTONE) injection 6 mg  6 mg      6 mg at 05/28/24 0904    lidocaine 1 % injection 0.5 mL  0.5 mL   Saturnino Quinn PA-C   0.5 mL at 10/11/23 0914    triamcinolone (KENALOG-40) injection 10 mg  10 mg   Saturnino Quinn PA-C   10 mg at 10/11/23 0914     Allergies   Allergies   Allergen Reactions    Celexa [Citalopram Hydrobromide]      Decreased libido         Physical Exam       BP: 124/80 Pulse: 66     SpO2: 96 %      Vital Signs with Ranges  Pulse:  [66] 66  BP: (124)/(80) 124/80  SpO2:  [96 %] 96 %  160 lbs 3.2 oz    Constitutional: Well-appearing, no acute distress, left lower leg wrapped  Respiratory: Normal respiratory effort, CTAB  Cardiovascular: RRR, no m/r/g.  JVP < 7 cm H2O.  There is no right LE edema.  As before, left lower leg is wrapped with an Ace bandage and there is significant left lower extremity swelling noted.  Normal carotid upstrokes, no carotid bruits.

## 2024-10-03 NOTE — LETTER
10/3/2024    Edel Mccarty MD  [address] [facility] [address]    RE: Angeli Jacobson       Dear Colleague,     I had the pleasure of seeing Angeli Jacobson in the [facility].  CARDIOLOGY CLINIC FOLLOW-UP VISIT      REASON FOR VISIT:   F/u CAD    PRIMARY CARE PHYSICIAN:  Edel Mccarty        History of Present Illness  Angeli Jacobson is an extremely pleasant 74 year old female here for routine follow-up.  She has a past medical history significant for severe coronary calcification by coronary calcium scan (CAC = 3897 in 2021), remote left thigh myxoid liposarcoma s/p resection in 2000 that resulted in removal of a portion of her femoral artery that was apparently then replaced by graft, recurrent peripheral artery disease in the same area s/p multiple peripheral vascular interventions, most recently a left femoral-popliteal bypass in 1/2019, as well as dyslipidemia and left tibial fracture in 12/2020 with several subsequent orthopedic surgeries, and chronic left lower extremity lymphedema.    I first met her in 8/2021 and she was entirely asymptomatic at the time, so we managed her medically without invasive angiography.  We began by continuing her Crestor and Zetia, but she was quite far from her LDL goal, so we initiated the approval process for PCSK9 inhibitors, and she ended up receiving a willie for coverage of Repatha.  She has done very well with this.  Her LDL has dropped from the 120-140 range, to the 50-60 range or lower.  She has not had any side effects with this.      Since her last visit in June 2023, she continues to be asymptomatic from a cardiac standpoint today, denying any chest pains, shortness of breath, lightheadedness, palpitations, or lower extremity swelling.  No major bleeding issues on the Plavix/low-dose Xarelto combination.      Her most recent labs were from 9/27/2024, showing total cholesterol 102, HDL 52, LDL  31, triglycerides 95, normal sodium and potassium, normal renal function, normal hemoglobin and normal platelets.  Her most recent ECG was done on 7/21/2021 and showed normal sinus rhythm with right bundle branch block and left anterior fascicular block.  Her most recent echocardiogram was an exercise stress echo from 7/14/2023, where she exercised for 5 minutes on a Shay protocol (7 METS), with no ECG or echo evidence of inducible ischemia, and no significant valvular abnormalities noted on baseline echo images.  This was also her most recent ischemic evaluation.  Her coronary calcium score from 7/1/2021 is described above.      Assessment & Plan    CAD by CT scan (coronary calcium score 3897 in 7/2021), asymptomatic  Negative ELLE in 7/2023  Severe LLE PAD s/p multiple interventions, most recently femoropopliteal bypass 1/2019  Changed to low-dose Xarelto/Plavix by vascular due to possible LE clot in 2023  Familial hyperlipidemia, now well controlled with Repatha  Bifascicular block (RBBB, LAFB) in 2021 ECG  Left tibial fracture in 12/2020 s/p several orthopedic surgeries  Chronic left lower extremity lymphedema      It was a pleasure to meet with Angeli and her  again in clinic today.  I am very happy to hear that she continues to do so well from a cardiac standpoint.  Once again her blood pressure and lipids are very well-controlled, she is not having any cardiac symptoms, and she is not having any bleeding issues or side effects from her medications.  At this point I would continue all of her current medications unchanged.  We will plan to see her back in 1 year, and we will check an ECG at that time to follow-up on her bifascicular block.      -Conservative management of elevated, asymptomatic coronary calcium score for now   -Would favor invasive angiography if suggestive symptoms develop in the future  -Continue low-dose Xarelto/Plavix  -Continue Crestor 20 mg daily and Zetia 10 mg  daily  -Continue Repatha 140 mg every 14 days  -ECG prior to clinic visit next year to follow bifascicular block        Follow-up: 12 months, with ECG and lab before hand, or sooner as needed      Spencer Wilder MD  Interventional Cardiology  October 3, 2024      The longitudinal plan of care for the diagnosis(es)/condition(s) as documented were addressed during this visit. Due to the added complexity in care, I will continue to support Angeli in the subsequent management and with ongoing continuity of care.          Medications  Current Outpatient Medications   Medication Sig Dispense Refill     calcium carbonate 600 mg-vitamin D 400 units (CALTRATE) 600-400 MG-UNIT per tablet Take 1 chew tab by mouth daily       clopidogrel (PLAVIX) 75 MG tablet Take 1 tablet (75 mg) by mouth daily 90 tablet 3     denosumab (PROLIA) 60 MG/ML SOSY injection        evolocumab (REPATHA) 140 MG/ML prefilled autoinjector Inject 1 mL (140 mg) Subcutaneous every 14 days 6 mL 2     ezetimibe (ZETIA) 10 MG tablet Take 1 tablet (10 mg) by mouth daily 90 tablet 3     letrozole (FEMARA) 2.5 MG tablet Take 1 tablet (2.5 mg) by mouth daily 90 tablet 3     levothyroxine (SYNTHROID/LEVOTHROID) 75 MCG tablet Take 1 tablet (75 mcg) by mouth daily 90 tablet 3     magnesium oxide 200 MG TABS        multivitamin, therapeutic (THERA-VIT) TABS tablet Take 1 tablet by mouth daily       nitroFURantoin macrocrystal-monohydrate (MACROBID) 100 MG capsule Take 1 capsule after intercourse 30 capsule 0     rivaroxaban ANTICOAGULANT (XARELTO) 2.5 MG TABS tablet Take 1 tablet (2.5 mg) by mouth 2 times daily. 180 tablet 3     rosuvastatin (CRESTOR) 20 MG tablet Take 1 tablet (20 mg) by mouth daily 90 tablet 3     Sharps Container MISC Use as per  instructions for safe needle disposal 1 each 1     solifenacin (VESICARE) 5 MG tablet Take 1 tablet (5 mg) by mouth daily 90 tablet 3     Current Facility-Administered Medications   Medication Dose  Route Frequency Provider Last Rate Last Admin     0.5 mL ropivacaine (NAROPIN) injection 5 mg/mL  0.5 mL   Saturnino Quinn PA-C   0.5 mL at 10/11/23 0914     1 mL ropivacaine (NAROPIN) injection 5 mg/mL  1 mL      1 mL at 05/28/24 0904     betamethasone acet & sod phos (CELESTONE) injection 6 mg  6 mg      6 mg at 05/28/24 0904     lidocaine 1 % injection 0.5 mL  0.5 mL   Saturnino Quinn PA-C   0.5 mL at 10/11/23 0914     triamcinolone (KENALOG-40) injection 10 mg  10 mg   Saturnino Quinn PA-C   10 mg at 10/11/23 0914     Allergies  Allergies   Allergen Reactions     Celexa [Citalopram Hydrobromide]      Decreased libido         Physical Exam      BP: 124/80 Pulse: 66     SpO2: 96 %      Vital Signs with Ranges  Pulse:  [66] 66  BP: (124)/(80) 124/80  SpO2:  [96 %] 96 %  160 lbs 3.2 oz    Constitutional: Well-appearing, no acute distress, left lower leg wrapped  Respiratory: Normal respiratory effort, CTAB  Cardiovascular: RRR, no m/r/g.  JVP < 7 cm H2O.  There is no right LE edema.  As before, left lower leg is wrapped with an Ace bandage and there is significant left lower extremity swelling noted.  Normal carotid upstrokes, no carotid bruits.      Thank you for allowing me to participate in the care of your patient.      Sincerely,     Spencer Wilder MD     [facility]  cc:   Spencer Wilder MD  [address]  [place] [address]

## 2024-10-04 ENCOUNTER — MYC MEDICAL ADVICE (OUTPATIENT)
Dept: INTERNAL MEDICINE | Facility: CLINIC | Age: 74
End: 2024-10-04
Payer: COMMERCIAL

## 2024-10-16 ENCOUNTER — ALLIED HEALTH/NURSE VISIT (OUTPATIENT)
Dept: UROLOGY | Facility: CLINIC | Age: 74
End: 2024-10-16
Payer: COMMERCIAL

## 2024-10-16 DIAGNOSIS — R39.9 UTI SYMPTOMS: Primary | ICD-10-CM

## 2024-10-16 LAB
ALBUMIN UR-MCNC: NEGATIVE MG/DL
APPEARANCE UR: CLEAR
BILIRUB UR QL STRIP: NEGATIVE
COLOR UR AUTO: YELLOW
GLUCOSE UR STRIP-MCNC: NEGATIVE MG/DL
HGB UR QL STRIP: ABNORMAL
KETONES UR STRIP-MCNC: NEGATIVE MG/DL
LEUKOCYTE ESTERASE UR QL STRIP: ABNORMAL
NITRATE UR QL: NEGATIVE
PH UR STRIP: 7 [PH] (ref 5–7)
RESIDUAL VOLUME (RV) (EXTERNAL): 294
SP GR UR STRIP: 1.01 (ref 1–1.03)
UROBILINOGEN UR STRIP-ACNC: 0.2 E.U./DL

## 2024-10-16 PROCEDURE — 51798 US URINE CAPACITY MEASURE: CPT

## 2024-10-16 PROCEDURE — 81003 URINALYSIS AUTO W/O SCOPE: CPT | Mod: QW

## 2024-10-16 PROCEDURE — 87086 URINE CULTURE/COLONY COUNT: CPT

## 2024-10-16 NOTE — PROGRESS NOTES
Angeli Jacobson comes into clinic today for UTI Symptoms, at the request of MAURI Copeland, Ordering Provider for UA/PVR.    Pt states her symptoms recently have been occasional lower abdominal cramping and fatigue.    PVR: 294 mL by bladder scan    Pt states her bladder does not feel full.    Pr MAURI Copeland, pt will stop OAB medication (solifenacin) and follow up with Nancy in about 3-4 weeks to recheck PVR.  Pt is open to considering learning SIC and will discuss at that appointment with Nancy.  Per Nancy, pt may also need another cystoscopy.    This service provided today was under the supervising provider of the day MAURI Copeland, who was available if needed.    Viridiana Aquino

## 2024-10-18 ENCOUNTER — VIRTUAL VISIT (OUTPATIENT)
Dept: SLEEP MEDICINE | Facility: CLINIC | Age: 74
End: 2024-10-18
Payer: COMMERCIAL

## 2024-10-18 DIAGNOSIS — G47.33 OSA ON CPAP: Primary | ICD-10-CM

## 2024-10-18 LAB — BACTERIA UR CULT: NORMAL

## 2024-10-18 PROCEDURE — 99213 OFFICE O/P EST LOW 20 MIN: CPT | Mod: 95 | Performed by: STUDENT IN AN ORGANIZED HEALTH CARE EDUCATION/TRAINING PROGRAM

## 2024-10-18 NOTE — PROGRESS NOTES
Angeli Jacobson is a 74 year old year old who is being evaluated via a billable video visit.    Video-Visit Details  Video Start Time:  10:34 AM  Video End Time: 10:42 AM  Originating Location (pt. Location): Home  Distant Location (provider location):  On-site  Platform used for Video Visit: [facility]     Sleep clinic follow up visit note    Date on this visit: 10/18/2024    Primary Physician: Edel Mccarty     History of present illness:  Angeli Jacobson is a 74 year old female patient with hypothyroidism, goiter, PAD, OAB, breast cancer who presents for CPAP Follow Up  She has mild sleep apnea with an AHI of 9, managed with CPAP.     Do you use a CPAP Machine at home: Yes  DME: [facility]; set up at [place] on July 19, 2024  Overall, on a scale of 0-10 how would you rate your CPAP (0 poor, 10 great): 10    What type of mask do you use: Nasal Pillow  Is your mask comfortable: Yes  If not, why:      Is your mask leaking: No  If yes, where do you feel it:    How many night per week does the mask leak (0-7):      Do you notice snoring with mask on: No  Do you notice gasping arousals with mask on: No  Are you having significant oral or nasal dryness: No  Is the pressure setting comfortable: Yes  If not, why:      What is your typical bedtime: 10:00PM  How long does it take you to go to sleep on PAP therapy: 15minutes  What time do you typically get out of bed for the day: 7:00AM  How many hours on average per night are you using PAP therapy:    How many hours are you sleeping per night:    Do you feel well rested in the morning:      ResMed   Auto-PAP 5.0 - 15.0 cmH2O 30 day usage data:    100% of days with > 4 hours of use. 0/30 days with no use.   Average use 516 minutes per day.   95%ile Leak 26.67 L/min.   CPAP 95% pressure 8.4 cm.   AHI 0.89 events per hour.          Assessment and Plan:  Problem List Items Addressed This Visit       ALEXANDER on CPAP - Primary      Angeli Jacobson has Mild Sleep apnea. She is tolerating PAP well and reports adequate compliance with PAP therapy. Daytime symptoms are improved. and reports positive benefits with PAP use.   ALEXANDER is adequately controlled with Auto CPAP at the current settings per compliance DL.   Prescription provided for renewal of PAP supplies.  Recommended him/her to continue using the CPAP regularly during sleep and instructed  to get  the supplies for the PAP replaced regularly.    Patient instructed to remember to bring PAP with him/her if hospitalized and if anticipating procedure that requires sedation/surgery to be sure to discuss with the provider/surgeon that he/she has sleep apnea and uses PAP therapy.          Relevant Orders    Comprehensive DME (Completed)       SCALES:  EPWORTH SLEEPINESS SCALE       10/18/2024    10:00 AM    [place] Sleepiness Scale ( AMY Rosas  [id number]<br>ESS - [place]/English - Final version - 21 Nov 07 - [facility] [place].)   [place] Score (Sleep) 3       INSOMNIA SEVERITY INDEX (ASHLEY)        10/18/2024     9:59 AM   Insomnia Severity Index (ASHLEY)   Difficulty falling asleep 1   Difficulty staying asleep 2   Problems waking up too early 1   How SATISFIED/DISSATISFIED are you with your CURRENT sleep pattern? 1   How NOTICEABLE to others do you think your sleep problem is in terms of impairing the quality of your life? 0   How WORRIED/DISTRESSED are you about your current sleep problem? 1   To what extent do you consider your sleep problem to INTERFERE with your daily functioning (e.g. daytime fatigue, mood, ability to function at work/daily chores, concentration, memory, mood, etc.) CURRENTLY? 1   ASHLEY Total Score 7     Guidelines for Scoring/Interpretation:  Total score categories:  0-7 = No clinically significant insomnia   8-14 = Subthreshold insomnia   15-21 = Clinical insomnia (moderate severity)  22-28 = Clinical insomnia (severe)  Used via courtesy of www.[employer].va.gov with  permission from Camilo Fernandes PhD., [facility]      Allergies:    Allergies   Allergen Reactions    Celexa [Citalopram Hydrobromide]      Decreased libido       Medications:    Current Outpatient Medications   Medication Sig Dispense Refill    calcium carbonate 600 mg-vitamin D 400 units (CALTRATE) 600-400 MG-UNIT per tablet Take 1 chew tab by mouth daily      clopidogrel (PLAVIX) 75 MG tablet Take 1 tablet (75 mg) by mouth daily 90 tablet 3    denosumab (PROLIA) 60 MG/ML SOSY injection       evolocumab (REPATHA) 140 MG/ML prefilled autoinjector Inject 1 mL (140 mg) Subcutaneous every 14 days 6 mL 2    ezetimibe (ZETIA) 10 MG tablet Take 1 tablet (10 mg) by mouth daily 90 tablet 3    letrozole (FEMARA) 2.5 MG tablet Take 1 tablet (2.5 mg) by mouth daily 90 tablet 3    levothyroxine (SYNTHROID/LEVOTHROID) 75 MCG tablet Take 1 tablet (75 mcg) by mouth daily 90 tablet 3    magnesium oxide 200 MG TABS       multivitamin, therapeutic (THERA-VIT) TABS tablet Take 1 tablet by mouth daily      nitroFURantoin macrocrystal-monohydrate (MACROBID) 100 MG capsule Take 1 capsule after intercourse 30 capsule 0    rivaroxaban ANTICOAGULANT (XARELTO) 2.5 MG TABS tablet Take 1 tablet (2.5 mg) by mouth 2 times daily. 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 , 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 - s/p lumpectomy + radiation 01/05/2023     Priority: Medium     Complete prior to 10.16  Angulo      Hypothyroidism, unspecified type 12/08/2022     Priority: Medium    Arthrofibrosis of knee joint, left 05/31/2022     Priority: Medium    Elevated coronary artery calcium score=7/1/21 08/03/2021     Priority: Medium    Other specified injury of left quadriceps muscle, fascia and tendon, initial encounter 07/28/2021     Priority: Medium    Closed fracture of left tibia and fibula, initial encounter 12/19/2020     Priority: Medium    Closed displaced fracture of left patella, unspecified fracture morphology, initial encounter 12/19/2020     Priority: Medium    Acute pain of left knee 10/29/2019     Priority: Medium    S/P total hip arthroplasty 10/04/2019     Priority: Medium    Myalgia of pelvic floor 09/11/2019     Priority: Medium    Lesion of bladder 09/11/2019     Priority: Medium    Postural urinary incontinence 07/17/2019     Priority: Medium    Personal history of urinary tract infection 07/17/2019     Priority: Medium    OAB (overactive bladder) 07/17/2019     Priority: Medium    S/P ORIF (open reduction internal fixation) fracture 05/02/2019     Priority: Medium    Hip pain, left 05/02/2019     Priority: Medium    History of sarcoma 01/21/2019     Priority: Medium    Femoral-popliteal bypass graft occlusion, left (H) 12/19/2018     Priority: Medium    Vascular graft occlusion (H) 04/30/2018     Priority: Medium    PAD (peripheral artery disease) (H) 04/20/2018     Priority: Medium    Vertigo 06/27/2017     Priority: Medium    Tubular adenoma 02/09/2016     Priority: Medium    Lymphedema of left leg 10/14/2014     Priority: Medium     Due to cancer      Osteoporosis 06/19/2008     Priority: Medium     Problem list name updated by automated process. 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     Priority: Medium     CHRONIC LEFT THIGH LYMPHEDEMA      Impaired fasting glucose 06/16/2010     Priority: Low    Hearing loss 01/29/2007     Priority: Low     Has hearing aids          Past Medical/Surgical History:  Past Medical History:   Diagnosis Date    * * * SBE PROPHYLAXIS * * * 1998    Amox 500mg, take 4 tabs one hour prior to procedure.Takes this because of lymphedema secondary from leg surgey    Antiplatelet or antithrombotic long-term use     Arrhythmia     Central serous retinopathy 2001    Resolved 9/2001    CHRONIC NECK PAIN 1995    Depressive disorder     Depressive disorder, not elsewhere classified 2001    Elevated coronary artery calcium score=7/1/21 08/03/2021    Elevated coronary artery calcium score=7/1/21 08/03/2021    History of blood transfusion 04/2019 12/2020    during left hip pinning, during surgery for patella    Lateral epicondylitis     MIXED HYPERLIPIDEMIA, LDL GOAL <160 1998    LDL goal < 160    Motion sickness     MYXOID LIPOSARCOMA 2000    Left thigh, S/P excision, radiation  at [facility]    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    CHRONIC LEFT THIGH LYMPHEDEMA    Nontoxic multinodular goiter 2005    needs yearly     ALEXANDER (obstructive sleep apnea) 7/3/2024    Osteoporosis, unspecified 2001    Other chronic pain     fx ribs left     Other lymphedema 2000    left thigh, gets regular PT for this    Overactive bladder     PAD (peripheral artery disease) (H) 04/20/2018    PONV (postoperative nausea and vomiting)     SHINGLES 2001    Sprain of lumbosacral (joint) (ligament) 1995    right    Unspecified hearing loss 1998    chronic tinnitus    Unspecified tinnitus 1998     Past Surgical History:    Procedure Laterality Date    ARTHROPLASTY HIP Left 10/4/2019    Procedure: Removal of left femoral hardware with a conversion to left total hip arthroplasty using a Biomet Annalisa femoral stem with an OsseoTi acetabular shell and a dual mobility bearing surface;  Surgeon: Spencer Celeste MD;  Location: [facility] OR    BIOPSY  April 2000    BIOPSY BREAST SEED LOCALIZATION Right 12/14/2022    Procedure: right breast tag localized lumpectomy;  Surgeon: Shelly Carr MD;  Location:  OR    BIOPSY NODE SENTINEL Right 12/14/2022    Procedure: with right sentinel lymph node biopsy;  Surgeon: Shelly Carr MD;  Location:  OR    BYPASS GRAFT FEMOROPOPLITEAL Left 1/21/2019    Procedure: LEFT FEMORAL TO ABOVE KNEE POPLITEAL  BYPASS WITH POLYTETRAFLUOROETHYLENE GRAFT;  Surgeon: Shade Owens MD;  Location:  OR    COLONOSCOPY N/A 2/5/2016    Procedure: COMBINED COLONOSCOPY, SINGLE OR MULTIPLE BIOPSY/POLYPECTOMY BY BIOPSY;  Surgeon: Varun Stanley MD, MD;  Location:  GI    COLONOSCOPY N/A 12/10/2021    Procedure: COLONOSCOPY, WITH POLYPECTOMIES  USING COLD  SNARE,   CLIP APPLIED X2;  Surgeon: Dayton Luna MD;  Location:  GI    CYSTOSCOPY      EXCISION MALIG LESION>1.25CM  5/2000    Myxoid Liposarcoma      EXPLORE GROIN Right 5/1/2018    Procedure: EXPLORE GROIN;  EMERGENCY RIGHT FEMORAL EXPLORATION WITH FEMORAL ARTERY REPAIR.    EBL: 50mL;  Surgeon: Shade Owens MD;  Location:  OR    HC COLP CERVIX/UPPER VAGINA  07/1997    Negative    HC DILATION/CURETTAGE DIAG/THER NON OB  02/1997    Post menopausal bleeding on HRT, negative    HIP SURGERY Left 04/13/2019    IR ANGIOGRAM THROUGH CATHETER FOLLOW UP  12/20/2018    IR ANGIOGRAM THROUGH CATHETER FOLLOW UP  12/21/2018    IR LOWER EXTREMITY ANGIOGRAM LEFT  12/19/2018    OPEN REDUCTION INTERNAL FIXATION PATELLA Left 12/21/2020    Procedure: Left partial patella fracture excision with advancement of the quadriceps tendon;  Surgeon: Carmelo  MD Stephen;  Location: [facility] OR    OPEN REDUCTION INTERNAL FIXATION RODDING INTRAMEDULLARY TIBIA Left 12/21/2020    Procedure: Open reduction intramedullary nailing of left tibia fracture;  Surgeon: Stephen Rhodes MD;  Location: [facility] OR    REMOVE HARDWARE KNEE Left 5/31/2022    Procedure: Left knee patella hardware removal;  Surgeon: Spencer Celeste MD;  Location: [facility] OR    REPAIR TENDON QUADRICEPS Left 7/28/2021    Procedure: Left knee quadricepsplasty with intraoperative patellar fracture requiring open reduction and internal fixation of the patella;  Surgeon: Spencer Celeste MD;  Location: [facility] OR    REPAIR TENDON QUADRICEPS Left 5/31/2022    Procedure: Open left knee VY quadricepsplasty with hardware removal and allograft;  Surgeon: Spencer Celeste MD;  Location: [facility] OR    VASCULAR SURGERY  04/30/2018    Left SFA stent in bypass graft    ZZC APPENDECTOMY      Appendectomy    ZZC NONSPECIFIC PROCEDURE  04/2000    Open Biopsy Left Thigh Liposarcoma    ZZHC COLONOSCOPY THRU STOMA, DIAGNOSTIC  2006    due 2010       Social History:  Social History     Socioeconomic History    Marital status:      Spouse name: Chris    Number of children: 3    Years of education: 14    Highest education level: Associate degree: academic program   Occupational History    Occupation: at home     Employer: NONE    Tobacco Use    Smoking status: Never     Passive exposure: Never    Smokeless tobacco: Never   Vaping Use    Vaping status: Never Used   Substance and Sexual Activity    Alcohol use: Never    Drug use: Never    Sexual activity: Yes     Partners: Male     Birth control/protection: Male Surgical     Comment:  had a vasectomy   Other Topics Concern    Parent/sibling w/ CABG, MI or angioplasty before 65F 55M? No   Social History Narrative    Not on file     Social Determinants of Health     Financial Resource Strain: Low Risk  (7/19/2024)    Financial Resource Strain     Within the past 12  months, have you or your family members you live with been unable to get utilities (heat, electricity) when it was really needed?: No   Food Insecurity: Low Risk  (7/19/2024)    Food Insecurity     Within the past 12 months, did you worry that your food would run out before you got money to buy more?: No     Within the past 12 months, did the food you bought just not last and you didn t have money to get more?: No   Transportation Needs: Low Risk  (7/19/2024)    Transportation Needs     Within the past 12 months, has lack of transportation kept you from medical appointments, getting your medicines, non-medical meetings or appointments, work, or from getting things that you need?: No   Physical Activity: Inactive (7/19/2024)    Exercise Vital Sign     Days of Exercise per Week: 0 days     Minutes of Exercise per Session: 0 min   Stress: No Stress Concern Present (7/19/2024)    [place] [employer] - Occupational Stress Questionnaire     Feeling of Stress : Only a little   Social Connections: Unknown (7/19/2024)    Social Connection and Isolation Panel [NHANES]     Frequency of Communication with Friends and Family: Not on file     Frequency of Social Gatherings with Friends and Family: Once a week     Attends Jain Services: Not on file     Active Member of Clubs or Organizations: Not on file     Attends Club or Organization Meetings: Not on file     Marital Status: Not on file   Interpersonal Safety: Low Risk  (7/23/2024)    Interpersonal Safety     Do you feel physically and emotionally safe where you currently live?: Yes     Within the past 12 months, have you been hit, slapped, kicked or otherwise physically hurt by someone?: No     Within the past 12 months, have you been humiliated or emotionally abused in other ways by your partner or ex-partner?: No   Housing Stability: Low Risk  (7/19/2024)    Housing Stability     Do you have housing? : Yes     Are you worried about losing your  housing?: No       Family History:  Family History   Problem Relation Age of Onset    C.A.D. Father         MI 57    Alcohol/Drug Father         etoh    Coronary Artery Disease Father     Obesity Mother     Osteoporosis Mother     Colon Cancer Brother 70    Hyperlipidemia Son     Anxiety Disorder Son     Hyperlipidemia Son         very high, experimental drug    C.A.D. Paternal Grandmother         ascvd    Diabetes Maternal Grandmother     Cancer Maternal Grandmother     C.A.D. Paternal Uncle         Mi  age 48    Cancer Maternal Aunt         pancreatic CA    Hyperlipidemia Son     Hyperlipidemia Cousin        Review of systems  A complete review of systems reviewed by me is negative with the exeption of what has been mentioned in the history of present illness.    Physical Examination:  Vitals: LMP  (LMP Unknown)   BMI= There is no height or weight on file to calculate BMI.     GENERAL: alert and no distress  EYES: Eyes grossly normal to inspection.  No discharge or erythema, or obvious scleral/conjunctival abnormalities.  RESP: No audible wheeze, cough, or visible cyanosis.    SKIN: Visible skin clear. No significant rash, abnormal pigmentation or lesions.  NEURO: Cranial nerves grossly intact.  Mentation and speech appropriate for age.  PSYCH: Appropriate affect, tone, and pace of words          Other tests/labs:  I have reviewed the labs and personally reviewed the imaging below and made my comment in the assessment and plan.    Patient was strongly advised to avoid driving, operating any heavy machinery or other hazardous situations while drowsy or sleepy.  Patient was counseled on the importance of driving while alert, to pull over if drowsy, or nap before getting into the vehicle if sleepy.      Plan is for Angeli Jacobson to follow up in about 12 year(s).     The above note was dictated using voice recognition software. Although reviewed after completion, some word and grammatical error may  remain . Please contact the author for any clarifications.    Total time spent reviewing medical records, history and physical examination, review of previous testing and interpretation as well as documentation on this date, 10/18/24: 10 minutes    Renee Miller MD on 10/18/24  [facility]   [address], [address]   [address] [place] [address]   Appointments: [phone]     CC: No ref. provider found

## 2024-10-18 NOTE — TELEPHONE ENCOUNTER
Patient contacted sleep clinic, she is looking to be scheduled sooner fr compliance visit. I explained to patient that there is no sooner availability at this time. Patient was offered the cancellation wait list, she will also contact [facility] to inform them she is unable to be seen until 12/20/2024.  Jennifer Wagoner MA

## 2024-10-18 NOTE — PATIENT INSTRUCTIONS
MY INFORMATION ON SLEEP APNEA-  Angelinydia Jacobson    DOCTOR : Renee Miller MD  SLEEP CENTER :      MY CONTACT NUMBER:    [facility]  [phone]  [facility]      For general sleep health questions:   http://sleepeducation.org    For tips about PAP and COVID-19:  [email or web address]://www.[email or web address].org/patients/patient-resources/resources/covid-19-and-home-pap-therapy.pdf    For general info about COVID-19 including vaccines:  [email or web address]      Continue PAP therapy every night, for all hours that you are sleeping (including naps.)  As always, try to get at least 8 hours of sleep or more each day, keep a regular sleep schedule, and avoid sleep deprivation. Avoid alcohol.  Reasons that you might need a change to your pressure therapy would be weight gain or loss, waking having inadvertently removed your PAP overnight, having previously felt refreshed by sleep with CPAP use and now waking un-refreshed, and return of daytime sleepiness. Also, the development of new medical problems  (such as heart failure, stroke, medications such as narcotics) can sometimes affect breathing at night and change your PAP therapy needs.  Please bring PAP with you if you are hospitalized.  If anticipating surgery be sure to discuss with your surgeon that you have sleep apnea and use PAP therapy.    Maintain your equipment as recommended which includes routine cleaning and replacement of supplies.      Call DME for any questions regarding supplies or maintenance.    [facility], [facility] [phone]    Do not drive on engage in potentially dangerous activities if feeling sleepy.  Please follow up in sleep clinic again in 12 months.        Tips for your PAP use-    Mask fitting tips  Mask fitting exercise:    To improve your mask seal and your mobility at night, put mask on and secure in place.  Lie down in bed with full pressure  and roll to one side, adjust headgear while in that position to eliminate any leaks. Repeat process rolling to other side.     The mask seal does not have to be perfect:   CPAP machines are designed to make up for small leaks. However, you will not tolerate leaks blowing in your eyes so you will need to adjust.   Any leak should only be near or at the bottom of the mask.  We expect your mask to leak slightly at night.    Do not over-tighten the headgear straps, tighter IS NOT better, we expect minimal leak.    First try re-positioning the mask or headgear before tightening the headgear straps.  Mask leaks are expected due to changing sleeping positions. Try pulling the mask away from your skin allowing the cushion to re-inflate will minimize the leak.  If you struggle for a good fit, try turning the CPAP off and then readjust the mask by pulling it away from your face and then turning back on the CPAP.        Humidifier tips  Humidifiers can be adjusted to increase or decrease the amount of moisture according to your comfort level. You may need to adjust this frequently at first, but then might only change it with seasonal weather changes.     Try INCREASING the humidity if:  You experience a dry, irritated nasal passage or throat.  You have a runny, drippy nose or sneezing fits after using CPAP.  You experience nasal congestion during or after CPAP use.    Try DECREASING the humidity if:  You have excessive condensation or  rain out  in the tubing or mask.  Otherwise keep the tubing warm during the night by running it underneath the blankets or pillow.      Clinic visit after initial PAP set-up   Bring your equipment with you to your 5-8 week follow up clinic visit.  We will be extracting your data from the machine if not available from the cloud based [employer].        Travel  Always take your equipment with you when you travel.  If you fly with your equipment bring it on with you as a carry on.  Medical equipment  does not count as a carry on.    If you travel international the machines take 110-240v.  The only adapter needed is the adapter that will fit into the receptacle (outlet).    You may also want to bring an extension cord as many hotel rooms have limited outlets at the bedside.  Do not travel with water in your humidifier chamber.     Cleaning and Maintenance Guidelines    Equipment Frequency Cleaning Method   Mask First Day    Daily      Weekly Soak mask in hot soapy water for 30 minutes, rinse and air dry.  Wipe nasal cushion with a hot soapy (Ivory, baby shampoo) cloth and rinse.  Baby wipes may also be used.  Do not use anti-bacterial soaps,Keyanna  liquid soap, rubbing alcohol, bleach or ammonia.  Wash frame in hot soapy water (Ivory, baby shampoo) rinse and let air dry   Headgear Biweekly Wash in hot soapy water, rinse and air dry   Reusable Gray Filter Weekly Wash in hot soapy water, rinse, put in towel squeeze moisture out, let air dry   Disposable White Filter Check Weekly Replace when brown or gray in color; at least every 2 to 3 months   Humidifier Chamber Daily    Weekly Empty distilled water from humidifier and let air dry    Hand wash in hot soapy water, rinse and air dry   Tubing Weekly Wash in hot soapy water, rinse and let air dry   Mask, Tubing and Humidifier Chamber As needed Disinfect: Soak in 1 part distilled white vinegar to 3 parts hot water for 30 minutes, rinse well and air dry  Not the material headgear        MASK AND SUPPLY REORDERING and EQUIPMENT NEEDS through your DME and per your insurance  Reminder: Most insurance companies will allow for a new mask, headgear, tubing, and reusable gray filter every six months.  Disposable white ultra-fine filters are covered monthly.      HOME AND SAFETY INSTRUCTIONS  Do not use frayed or cracked electrical cords, multi plug adaptors, or switched receptacles  Do not immerse electrical equipment into water  Assure that electrical cords do not become a  tripping hazard

## 2024-10-18 NOTE — NURSING NOTE
"    Chief Complaint   Patient presents with    CPAP Follow Up       Initial LMP  (LMP Unknown)  Estimated body mass index is 26.25 kg/m  as calculated from the following:    Height as of 10/3/24: 1.664 m (5' 5.5\").    Weight as of 10/3/24: 72.7 kg (160 lb 3.2 oz).    Medication Reconciliation: complete    Neck circumference:  inches /  centimeters.    DME: [facility]    Patient is in the state of [place].    Jana Gupta [facility] ~[place]       "

## 2024-10-19 ENCOUNTER — HEALTH MAINTENANCE LETTER (OUTPATIENT)
Age: 74
End: 2024-10-19

## 2024-10-22 ENCOUNTER — ALLIED HEALTH/NURSE VISIT (OUTPATIENT)
Dept: UROLOGY | Facility: CLINIC | Age: 74
End: 2024-10-22
Payer: COMMERCIAL

## 2024-10-22 DIAGNOSIS — R39.9 UTI SYMPTOMS: Primary | ICD-10-CM

## 2024-10-22 LAB
ALBUMIN UR-MCNC: NEGATIVE MG/DL
APPEARANCE UR: ABNORMAL
BILIRUB UR QL STRIP: NEGATIVE
COLOR UR AUTO: YELLOW
GLUCOSE UR STRIP-MCNC: NEGATIVE MG/DL
HGB UR QL STRIP: ABNORMAL
KETONES UR STRIP-MCNC: NEGATIVE MG/DL
LEUKOCYTE ESTERASE UR QL STRIP: ABNORMAL
NITRATE UR QL: NEGATIVE
PH UR STRIP: 8.5 [PH] (ref 5–7)
SP GR UR STRIP: 1.01 (ref 1–1.03)
UROBILINOGEN UR STRIP-ACNC: 0.2 E.U./DL

## 2024-10-22 PROCEDURE — 81003 URINALYSIS AUTO W/O SCOPE: CPT | Mod: QW

## 2024-10-22 PROCEDURE — 99207 PR NO CHARGE NURSE ONLY: CPT

## 2024-10-22 PROCEDURE — 87181 SC STD AGAR DILUTION PER AGT: CPT

## 2024-10-22 PROCEDURE — 87086 URINE CULTURE/COLONY COUNT: CPT

## 2024-10-22 PROCEDURE — 87088 URINE BACTERIA CULTURE: CPT

## 2024-10-22 NOTE — PROGRESS NOTES
Angelinydia Jacobson comes into clinic today at the request of Nancy Chawla PA-C Ordering Provider for Cathed UAUC.    Pt presents to clinic today for cathed UAUC for continuous UTI symptoms. Pt was prepped in typical sterile fashion, a sterile, well lubricated 14 fr straight female catheter was passed to obtain a urine specimen. This was sent for culture today.     This service provided today was under the supervising provider of the keith Hernandez CNP, who was available if needed.    Angelina Wagner, CMA

## 2024-10-30 LAB — BACTERIA UR CULT: ABNORMAL

## 2024-11-01 DIAGNOSIS — N39.0 URINARY TRACT INFECTION: Primary | ICD-10-CM

## 2024-11-01 RX ORDER — DOXYCYCLINE 100 MG/1
100 CAPSULE ORAL 2 TIMES DAILY
Qty: 14 CAPSULE | Refills: 0 | Status: SHIPPED | OUTPATIENT
Start: 2024-11-01 | End: 2024-11-08

## 2024-11-13 NOTE — PLAN OF CARE
A/O x4. VSS. Transferred from cart to bed with air mat. Pt drowsy from nausea medications. Pain 1/10. Resting comfortably.     used

## 2024-11-21 DIAGNOSIS — Z17.0 MALIGNANT NEOPLASM OF UPPER-OUTER QUADRANT OF RIGHT BREAST IN FEMALE, ESTROGEN RECEPTOR POSITIVE (H): ICD-10-CM

## 2024-11-21 DIAGNOSIS — C50.411 MALIGNANT NEOPLASM OF UPPER-OUTER QUADRANT OF RIGHT BREAST IN FEMALE, ESTROGEN RECEPTOR POSITIVE (H): ICD-10-CM

## 2024-11-21 RX ORDER — LETROZOLE 2.5 MG/1
2.5 TABLET, FILM COATED ORAL DAILY
Qty: 90 TABLET | Refills: 3 | Status: SHIPPED | OUTPATIENT
Start: 2024-11-21

## 2024-11-29 ENCOUNTER — HOSPITAL ENCOUNTER (OUTPATIENT)
Dept: MAMMOGRAPHY | Facility: CLINIC | Age: 74
Discharge: HOME | End: 2024-11-29
Attending: INTERNAL MEDICINE | Admitting: INTERNAL MEDICINE
Payer: COMMERCIAL

## 2024-11-29 DIAGNOSIS — Z12.31 ENCOUNTER FOR SCREENING MAMMOGRAM FOR BREAST CANCER: ICD-10-CM

## 2024-11-29 DIAGNOSIS — Z17.0 MALIGNANT NEOPLASM OF UPPER-OUTER QUADRANT OF RIGHT BREAST IN FEMALE, ESTROGEN RECEPTOR POSITIVE (H): ICD-10-CM

## 2024-11-29 DIAGNOSIS — C50.411 MALIGNANT NEOPLASM OF UPPER-OUTER QUADRANT OF RIGHT BREAST IN FEMALE, ESTROGEN RECEPTOR POSITIVE (H): ICD-10-CM

## 2024-11-29 PROCEDURE — 77063 BREAST TOMOSYNTHESIS BI: CPT

## 2024-12-03 ENCOUNTER — MYC MEDICAL ADVICE (OUTPATIENT)
Dept: UROLOGY | Facility: CLINIC | Age: 74
End: 2024-12-03
Payer: COMMERCIAL

## 2024-12-09 ENCOUNTER — OFFICE VISIT (OUTPATIENT)
Dept: ORTHOPEDICS | Facility: CLINIC | Age: 74
End: 2024-12-09
Payer: COMMERCIAL

## 2024-12-09 VITALS
DIASTOLIC BLOOD PRESSURE: 79 MMHG | SYSTOLIC BLOOD PRESSURE: 121 MMHG | HEART RATE: 79 BPM | HEIGHT: 66 IN | BODY MASS INDEX: 25.73 KG/M2 | WEIGHT: 160.1 LBS

## 2024-12-09 DIAGNOSIS — M65.312 TRIGGER THUMB OF LEFT HAND: Primary | ICD-10-CM

## 2024-12-09 PROCEDURE — 20550 NJX 1 TENDON SHEATH/LIGAMENT: CPT | Mod: FA | Performed by: STUDENT IN AN ORGANIZED HEALTH CARE EDUCATION/TRAINING PROGRAM

## 2024-12-09 PROCEDURE — 99213 OFFICE O/P EST LOW 20 MIN: CPT | Mod: 25 | Performed by: STUDENT IN AN ORGANIZED HEALTH CARE EDUCATION/TRAINING PROGRAM

## 2024-12-09 RX ORDER — ROPIVACAINE HYDROCHLORIDE 5 MG/ML
1 INJECTION, SOLUTION EPIDURAL; INFILTRATION; PERINEURAL
Status: COMPLETED | OUTPATIENT
Start: 2024-12-09 | End: 2024-12-09

## 2024-12-09 RX ORDER — BETAMETHASONE SODIUM PHOSPHATE AND BETAMETHASONE ACETATE 3; 3 MG/ML; MG/ML
6 INJECTION, SUSPENSION INTRA-ARTICULAR; INTRALESIONAL; INTRAMUSCULAR; SOFT TISSUE
Status: COMPLETED | OUTPATIENT
Start: 2024-12-09 | End: 2024-12-09

## 2024-12-09 RX ADMIN — ROPIVACAINE HYDROCHLORIDE 1 ML: 5 INJECTION, SOLUTION EPIDURAL; INFILTRATION; PERINEURAL at 14:31

## 2024-12-09 RX ADMIN — BETAMETHASONE SODIUM PHOSPHATE AND BETAMETHASONE ACETATE 6 MG: 3; 3 INJECTION, SUSPENSION INTRA-ARTICULAR; INTRALESIONAL; INTRAMUSCULAR; SOFT TISSUE at 14:31

## 2024-12-09 NOTE — PROGRESS NOTES
"ASSESSMENT & PLAN    Angeli was seen today for musculoskeletal problem.    Diagnoses and all orders for this visit:    Trigger thumb of left hand  -     Hand / Upper Extremity Injection/Arthrocentesis: L thumb A1      This issue is acute and Unchanged. Angeli presents for clinic today to discuss her pain and clicking/locking of the left thumb.  Her history and exam findings today are consistent with a trigger thumb of the left thumb as the  of her symptoms.  Discussed treatment options including hand therapy, corticosteroid injections, and surgical intervention.  The patient had good response to her right trigger thumb with a corticosteroid injection, and would like to proceed with this on the left today.  Can determine the following plan:  - CSI to the left thumb A1 pulley performed today under ultrasound guidance, see procedure note below for details  - She can follow-up with me as needed      Trenton Sr [facility] [place]    -----  Chief Complaint   Patient presents with    Musculoskeletal Problem     Left thumb       SUBJECTIVE  Angeli Jacobson is a/an 74 year old female who is seen as a self referral for evaluation of left thumb.     The patient is seen by themselves.  The patient is Right handed    Onset: 2 month(s) ago. Reports insidious onset without acute precipitating event.  Location of Pain: left thumb  Worsened by: bending thumb a certain way,  and pulling  Better with: nothing  Treatments tried: rest/activity avoidance and Tylenol  Associated symptoms: swelling and trigger     Orthopedic/Surgical history: YES - Date: right thumb, hip   Social History/Occupation: retired      REVIEW OF SYSTEMS:  Review of systems negative unless mentioned in HPI     OBJECTIVE:  /79   Pulse 79   Ht 1.676 m (5' 6\")   Wt 72.6 kg (160 lb 1.6 oz)   LMP  (LMP Unknown)   BMI 25.84 kg/m     General: healthy, alert and in no distress  Skin: no suspicious " lesions or rash.  CV: distal perfusion intact   Resp: normal respiratory effort without conversational dyspnea   Psych: normal mood and affect  Gait: NORMAL  Neuro: Normal light sensory exam of IBIS extremity     Hand Exam    Left  Right   Inspection No atrophy, erythema , echymosis, or deformity  No atrophy, erythema , echymosis, or deformity    Palpation         Tenderness over  TTP 1st MCP  No tenderness to palpation of radial styloid, DRUJ, TFCC, scaphoid/snuffbox   No tenderness to palpation of radial styloid, DRUJ, TFCC, scaphoid/snuffbox   Range of Motion        1st digit IP flex/ext Normal Normal      DIP flexion/extension  Normal 2nd-5th Normal 2nd-5th      PIP flexion/extension  Normal 2nd-5th Normal 2nd-5th      MCP flexion/extension Normal Normal   Strength      1st digit IP flex/ext Full Full    DIP flexion/extension Full Full    PIP flexion/extension  Full Full    MCP flexion/extension Full Full   Pain with resisted None None   Vascular   Intact  Intact    Special Tests   1st MCP valgus 0/30 normal   Neg CMC grind  Triggering of L thumb  Able to make 'OK' sign (AIN)  Intact resisted abduction of digits    Sensation Intact to median, ulnar, and radial nerve distributions          RADIOLOGY:  No new imaging taken in clinic today    Hand / Upper Extremity Injection/Arthrocentesis: L thumb A1    Date/Time: 12/9/2024 2:31 PM    Performed by: Trenton Sr DO  Authorized by: Trenton Sr DO    Indications:  Pain, tendon swelling and therapeutic  Needle Size:  25 G  Guidance: ultrasound    Approach:  Volar  Condition: trigger finger    Location:  Thumb    Site:  L thumb A1  Medications:  6 mg betamethasone acet & sod phos 6 (3-3) MG/ML; 1 mL ROPivacaine 5 MG/ML  Outcome:  Tolerated well, no immediate complications  Procedure discussed: discussed risks, benefits, and alternatives    Consent Given by:  Patient  Timeout: timeout called immediately prior to procedure    Prep: patient was prepped and draped  in usual sterile fashion     Ultrasound was used to ensure safe and accurate needle placement and injection. Ultrasound images of the procedure were permanently stored.

## 2024-12-09 NOTE — LETTER
12/9/2024      Angeli Jacobson  [id number]  [facility] [id number]      Dear Colleague,    Thank you for referring your patient, Angeli Jacobson, to the [facility]. Please see a copy of my visit note below.    ASSESSMENT & PLAN    Angeli was seen today for musculoskeletal problem.    Diagnoses and all orders for this visit:    Trigger thumb of left hand  -     Hand / Upper Extremity Injection/Arthrocentesis: L thumb A1      This issue is acute and Unchanged. Angeli presents for clinic today to discuss her pain and clicking/locking of the left thumb.  Her history and exam findings today are consistent with a trigger thumb of the left thumb as the  of her symptoms.  Discussed treatment options including hand therapy, corticosteroid injections, and surgical intervention.  The patient had good response to her right trigger thumb with a corticosteroid injection, and would like to proceed with this on the left today.  Can determine the following plan:  - CSI to the left thumb A1 pulley performed today under ultrasound guidance, see procedure note below for details  - She can follow-up with me as needed      Trenton Sr, DO  [facility]    -----  Chief Complaint   Patient presents with     Musculoskeletal Problem     Left thumb       SUBJECTIVE  Angeli Jacobson is a/an 74 year old female who is seen as a self referral for evaluation of left thumb.     The patient is seen by themselves.  The patient is Right handed    Onset: 2 month(s) ago. Reports insidious onset without acute precipitating event.  Location of Pain: left thumb  Worsened by: bending thumb a certain way,  and pulling  Better with: nothing  Treatments tried: rest/activity avoidance and Tylenol  Associated symptoms: swelling and trigger     Orthopedic/Surgical history: YES - Date: right thumb, hip   Social History/Occupation:  "retired      REVIEW OF SYSTEMS:  Review of systems negative unless mentioned in HPI     OBJECTIVE:  /79   Pulse 79   Ht 1.676 m (5' 6\")   Wt 72.6 kg (160 lb 1.6 oz)   LMP  (LMP Unknown)   BMI 25.84 kg/m     General: healthy, alert and in no distress  Skin: no suspicious lesions or rash.  CV: distal perfusion intact   Resp: normal respiratory effort without conversational dyspnea   Psych: normal mood and affect  Gait: NORMAL  Neuro: Normal light sensory exam of IBIS extremity     Hand Exam    Left  Right   Inspection No atrophy, erythema , echymosis, or deformity  No atrophy, erythema , echymosis, or deformity    Palpation         Tenderness over  TTP 1st MCP  No tenderness to palpation of radial styloid, DRUJ, TFCC, scaphoid/snuffbox   No tenderness to palpation of radial styloid, DRUJ, TFCC, scaphoid/snuffbox   Range of Motion        1st digit IP flex/ext Normal Normal      DIP flexion/extension  Normal 2nd-5th Normal 2nd-5th      PIP flexion/extension  Normal 2nd-5th Normal 2nd-5th      MCP flexion/extension Normal Normal   Strength      1st digit IP flex/ext Full Full    DIP flexion/extension Full Full    PIP flexion/extension  Full Full    MCP flexion/extension Full Full   Pain with resisted None None   Vascular   Intact  Intact    Special Tests   1st MCP valgus 0/30 normal   Neg CMC grind  Triggering of L thumb  Able to make 'OK' sign (AIN)  Intact resisted abduction of digits    Sensation Intact to median, ulnar, and radial nerve distributions          RADIOLOGY:  No new imaging taken in clinic today    Hand / Upper Extremity Injection/Arthrocentesis: L thumb A1    Date/Time: 12/9/2024 2:31 PM    Performed by: Trenton Sr DO  Authorized by: Trenton Sr DO    Indications:  Pain, tendon swelling and therapeutic  Needle Size:  25 G  Guidance: ultrasound    Approach:  Volar  Condition: trigger finger    Location:  Thumb    Site:  L thumb A1  Medications:  6 mg betamethasone acet & sod phos 6 " (3-3) MG/ML; 1 mL ROPivacaine 5 MG/ML  Outcome:  Tolerated well, no immediate complications  Procedure discussed: discussed risks, benefits, and alternatives    Consent Given by:  Patient  Timeout: timeout called immediately prior to procedure    Prep: patient was prepped and draped in usual sterile fashion     Ultrasound was used to ensure safe and accurate needle placement and injection. Ultrasound images of the procedure were permanently stored.               Again, thank you for allowing me to participate in the care of your patient.        Sincerely,        Trenton Sr, DO

## 2024-12-16 ENCOUNTER — THERAPY VISIT (OUTPATIENT)
Dept: PHYSICAL THERAPY | Facility: CLINIC | Age: 74
End: 2024-12-16
Payer: COMMERCIAL

## 2024-12-16 DIAGNOSIS — R53.1 DECREASED STRENGTH: ICD-10-CM

## 2024-12-16 DIAGNOSIS — M25.60 DECREASED RANGE OF MOTION: ICD-10-CM

## 2024-12-16 DIAGNOSIS — Z85.831 HISTORY OF SARCOMA OF SOFT TISSUE: ICD-10-CM

## 2024-12-16 DIAGNOSIS — Z17.0 MALIGNANT NEOPLASM OF UPPER-OUTER QUADRANT OF RIGHT BREAST IN FEMALE, ESTROGEN RECEPTOR POSITIVE (H): ICD-10-CM

## 2024-12-16 DIAGNOSIS — C50.411 MALIGNANT NEOPLASM OF UPPER-OUTER QUADRANT OF RIGHT BREAST IN FEMALE, ESTROGEN RECEPTOR POSITIVE (H): ICD-10-CM

## 2024-12-16 DIAGNOSIS — M79.662 PAIN OF LEFT LOWER LEG: ICD-10-CM

## 2024-12-16 DIAGNOSIS — R53.81 PHYSICAL DECONDITIONING: ICD-10-CM

## 2024-12-16 DIAGNOSIS — I89.0 LYMPHEDEMA OF LEFT LEG: Primary | ICD-10-CM

## 2024-12-16 DIAGNOSIS — L90.5 SCAR CONDITION AND FIBROSIS OF SKIN: ICD-10-CM

## 2024-12-16 PROCEDURE — 97110 THERAPEUTIC EXERCISES: CPT | Mod: GP | Performed by: PHYSICAL THERAPIST

## 2024-12-16 PROCEDURE — 97140 MANUAL THERAPY 1/> REGIONS: CPT | Mod: GP | Performed by: PHYSICAL THERAPIST

## 2024-12-18 ENCOUNTER — ANCILLARY PROCEDURE (OUTPATIENT)
Dept: BONE DENSITY | Facility: CLINIC | Age: 74
End: 2024-12-18
Attending: INTERNAL MEDICINE
Payer: COMMERCIAL

## 2024-12-18 DIAGNOSIS — E21.3 HYPERPARATHYROIDISM (H): ICD-10-CM

## 2024-12-18 PROCEDURE — 77080 DXA BONE DENSITY AXIAL: CPT | Mod: TC

## 2024-12-20 DIAGNOSIS — E78.5 HYPERLIPIDEMIA LDL GOAL <70: ICD-10-CM

## 2024-12-20 DIAGNOSIS — I89.0 LYMPHEDEMA OF LEFT LEG: Primary | ICD-10-CM

## 2024-12-20 DIAGNOSIS — I73.9 PAD (PERIPHERAL ARTERY DISEASE) (H): ICD-10-CM

## 2024-12-31 ENCOUNTER — LAB (OUTPATIENT)
Dept: LAB | Facility: CLINIC | Age: 74
End: 2024-12-31
Payer: COMMERCIAL

## 2024-12-31 DIAGNOSIS — E11.9 DIABETES MELLITUS (H): ICD-10-CM

## 2024-12-31 DIAGNOSIS — E21.3 HYPERPARATHYROIDISM (H): ICD-10-CM

## 2024-12-31 LAB
ALBUMIN SERPL BCG-MCNC: 4.2 G/DL (ref 3.5–5.2)
ANION GAP SERPL CALCULATED.3IONS-SCNC: 9 MMOL/L (ref 7–15)
BUN SERPL-MCNC: 12.1 MG/DL (ref 8–23)
CALCIUM SERPL-MCNC: 9.5 MG/DL (ref 8.8–10.4)
CHLORIDE SERPL-SCNC: 108 MMOL/L (ref 98–107)
CREAT SERPL-MCNC: 0.62 MG/DL (ref 0.51–0.95)
CREAT UR-MCNC: 51 MG/DL
EGFRCR SERPLBLD CKD-EPI 2021: >90 ML/MIN/1.73M2
GLUCOSE SERPL-MCNC: 102 MG/DL (ref 70–99)
HCO3 SERPL-SCNC: 26 MMOL/L (ref 22–29)
MICROALBUMIN UR-MCNC: <12 MG/L
MICROALBUMIN/CREAT UR: NORMAL MG/G{CREAT}
PHOSPHATE SERPL-MCNC: 2.4 MG/DL (ref 2.5–4.5)
POTASSIUM SERPL-SCNC: 4.3 MMOL/L (ref 3.4–5.3)
PTH-INTACT SERPL-MCNC: 126 PG/ML (ref 15–65)
SODIUM SERPL-SCNC: 143 MMOL/L (ref 135–145)
VIT D+METAB SERPL-MCNC: 40 NG/ML (ref 20–50)

## 2024-12-31 PROCEDURE — 80069 RENAL FUNCTION PANEL: CPT

## 2024-12-31 PROCEDURE — 82306 VITAMIN D 25 HYDROXY: CPT

## 2024-12-31 PROCEDURE — 82043 UR ALBUMIN QUANTITATIVE: CPT

## 2024-12-31 PROCEDURE — 83970 ASSAY OF PARATHORMONE: CPT

## 2024-12-31 PROCEDURE — 36415 COLL VENOUS BLD VENIPUNCTURE: CPT

## 2024-12-31 PROCEDURE — 82570 ASSAY OF URINE CREATININE: CPT

## 2025-01-02 LAB
CALCIUM 24H UR-MRATE: 0.15 G/SPEC (ref 0.1–0.3)
CALCIUM UR-MCNC: 8.7 MG/DL
COLLECT DURATION TIME UR: 24 H
COLLECT DURATION TIME UR: 24 H
CREAT 24H UR-MRATE: 0.71 G/SPEC (ref 0.72–1.51)
CREAT UR-MCNC: 40.2 MG/DL
SPECIMEN VOL UR: 1775 ML
SPECIMEN VOL UR: 1775 ML

## 2025-01-03 ENCOUNTER — OFFICE VISIT (OUTPATIENT)
Dept: UROLOGY | Facility: CLINIC | Age: 75
End: 2025-01-03
Payer: COMMERCIAL

## 2025-01-03 VITALS
WEIGHT: 157 LBS | DIASTOLIC BLOOD PRESSURE: 80 MMHG | SYSTOLIC BLOOD PRESSURE: 126 MMHG | HEIGHT: 66 IN | BODY MASS INDEX: 25.23 KG/M2

## 2025-01-03 DIAGNOSIS — N32.81 OAB (OVERACTIVE BLADDER): Primary | ICD-10-CM

## 2025-01-03 DIAGNOSIS — R33.9 INCOMPLETE BLADDER EMPTYING: ICD-10-CM

## 2025-01-03 LAB — RESIDUAL VOLUME (RV) (EXTERNAL): 191

## 2025-01-03 PROCEDURE — 51798 US URINE CAPACITY MEASURE: CPT | Performed by: PHYSICIAN ASSISTANT

## 2025-01-03 PROCEDURE — 99213 OFFICE O/P EST LOW 20 MIN: CPT | Mod: 25 | Performed by: PHYSICIAN ASSISTANT

## 2025-01-03 ASSESSMENT — PAIN SCALES - GENERAL: PAINLEVEL_OUTOF10: NO PAIN (0)

## 2025-01-03 NOTE — NURSING NOTE
Chief Complaint   Patient presents with    Incomplete bladder emptying    OAB     Pt states she has been doing ok urination-wise.  As long as she thinks to void about every 2-3 hours then she can manage without having too strong of urgency.    No current UTI symptoms    PVR: 191 mL by bladder scan    Viridiana Aquino, Clinic Assistant

## 2025-01-03 NOTE — PATIENT INSTRUCTIONS
Continue with timed voiding every 2-3 hours.  This seems to be helping your symptom management.    Continue with bowel regimen of Metamucil and MiraLAX.  Would add just a little more MiraLAX to see if this helps the stool.  Could also incorporate some prune juice.    If worsening urinary symptoms, would consider posterior tibial nerve stimulation or a different medication.    Recommend follow up in 6 months for symptoms check and post void residual, sooner if issues.    Contact us in the interim with questions, concerns, or changes in symptomatology.  [phone]

## 2025-01-03 NOTE — PROGRESS NOTES
"Subjective      CHIEF COMPLAINT/REASON FOR VISIT   Patient of Dr. Oquendo's seen on my personal calendar  Follow up on OAB and incomplete emptying    HISTORY OF PRESENT ILLNESS   Ms. Jacobson is very pleasant 74 year old year old female, who presents today for follow-up on overactivity of the bladder as well as incomplete emptying.  I last saw her on 08/15/2024.  She previously followed with Dr. Oquendo.  She has gone to pelvic floor physical therapy.    Patient had previously trialed Toviaz, oxybutynin, and Myrbetriq.  She had been on Vesicare 5 mg for some time, and we discussed increasing this.  However, her postvoid residual was elevated at her visit in August.  We did recommend close follow-up.  Eventually, given elevated postvoid residual, recommendation was made to discontinue Vesicare in October 2024 his postvoid residual was up to 294 mL.  She did have a UTI around that time as well.    Patient's repeat postvoid residual today is 191 mL.  She denies any current symptoms of a urinary tract infection.  She does note that she has some issues with constipation and will get \"poop balls.\"  She is trying to manage this with Metamucil as well as MiraLAX.    She does note that she has been managing her urgency and frequency with timed voiding.  She is going to the bathroom approximately every 2-3 hours.  She does note some urgency, but no significant urgency.  As long as she is continuing with this, symptoms are relatively well-managed.    The following portions of the patient's history were reviewed and updated as appropriate: allergies, current medications, past family history, past medical history, past social history, past surgical history, and problem list.     REVIEW OF SYSTEMS   Review of Systems   Constitutional:  Negative for chills and fever.   Gastrointestinal:  Positive for constipation. Negative for nausea and vomiting.   Genitourinary:  Positive for frequency (Some, but better.) and urgency (Some, but better.). " Negative for dysuria and hematuria.      Per HPI.     Patient Active Problem List   Diagnosis    MULTINODUL GOITER    Hearing loss    Hyperlipidemia LDL goal <70    Osteoporosis    Impaired fasting glucose    Lymphedema of left leg    Tubular adenoma    Vertigo    Myxoid liposarcoma (HCC)    PAD (peripheral artery disease) (H)    Vascular graft occlusion (H)    Femoral-popliteal bypass graft occlusion, left (H)    History of sarcoma    S/P ORIF (open reduction internal fixation) fracture    Hip pain, left    Postural urinary incontinence    Personal history of urinary tract infection    OAB (overactive bladder)    Myalgia of pelvic floor    Lesion of bladder    S/P total hip arthroplasty    Acute pain of left knee    Closed fracture of left tibia and fibula, initial encounter    Closed displaced fracture of left patella, unspecified fracture morphology, initial encounter    Other specified injury of left quadriceps muscle, fascia and tendon, initial encounter    Elevated coronary artery calcium score=7/1/21     Arthrofibrosis of knee joint, left    Hypothyroidism, unspecified type    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 , 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 - s/p lumpectomy + radiation    Left knee pain    Long term current use of aromatase inhibitor    ALEXANDER on CPAP      Past Medical History:   Diagnosis Date    * * * SBE PROPHYLAXIS * * * 1998    Amox 500mg, take 4 tabs one hour prior to procedure.Takes this because of lymphedema secondary from leg surgey    Antiplatelet or antithrombotic long-term use     Arrhythmia     Central serous retinopathy 2001    Resolved 9/2001    CHRONIC NECK PAIN 1995    Depressive disorder     Depressive disorder, not elsewhere classified 2001    Elevated coronary artery calcium score=7/1/21 08/03/2021    Elevated coronary artery calcium score=7/1/21 08/03/2021    History of blood transfusion 04/2019 12/2020    during left hip pinning, during surgery for patella  "   Lateral epicondylitis     MIXED HYPERLIPIDEMIA, LDL GOAL <160 1998    LDL goal < 160    Motion sickness     MYXOID LIPOSARCOMA 2000    Left thigh, S/P excision, radiation  at [facility]    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    CHRONIC LEFT THIGH LYMPHEDEMA    Nontoxic multinodular goiter 2005    needs yearly US    ALEXANDER (obstructive sleep apnea) 7/3/2024    Osteoporosis, unspecified 2001    Other chronic pain     fx ribs left     Other lymphedema 2000    left thigh, gets regular PT for this    Overactive bladder     PAD (peripheral artery disease) (H) 04/20/2018    PONV (postoperative nausea and vomiting)     SHINGLES 2001    Sprain of lumbosacral (joint) (ligament) 1995    right    Unspecified hearing loss 1998    chronic tinnitus    Unspecified tinnitus 1998        Objective      PHYSICAL EXAM   /80   Ht 1.676 m (5' 6\")   Wt 71.2 kg (157 lb)   LMP  (LMP Unknown)   BMI 25.34 kg/m     Physical Exam  Constitutional:       Appearance: Normal appearance.   HENT:      Head: Normocephalic.   Eyes:      General: No scleral icterus.  Pulmonary:      Effort: Pulmonary effort is normal.   Musculoskeletal:      Comments: Ambulates with a cane. Antalgic gait noted with ambulation.   Neurological:      General: No focal deficit present.      Mental Status: She is alert and oriented to person, place, and time.   Psychiatric:         Mood and Affect: Mood normal.         Behavior: Behavior normal.       LABORATORY     Recent Labs   Lab Test 10/22/24  1536 10/16/24  1420 01/03/24  1649   COLOR Yellow   < > Charley*   APPEARANCE Cloudy*   < > Clear   URINEGLC Negative   < > Negative   URINEBILI Negative   < > Negative   URINEKETONE Negative   < > Negative   SG 1.015   < > 1.025   UBLD Trace*   < > Trace*   URINEPH 8.5*   < > 5.5   PROTEIN Negative   < > Negative   UROBILINOGEN 0.2   < > 0.2   NITRITE Negative   < > Positive*   LEUKEST Small*   < > Negative   RBCU  --   --  0-2   WBCU  --   --  10-25*    < > = values in this " interval not displayed.     TESTING    PVR: 191 mL    Assessment & Plan    1. OAB (overactive bladder)    2. Incomplete bladder emptying        I had the pleasure today of meeting with Ms. Jacobson to discuss her follow-up for overactivity of the bladder as well as incomplete bladder emptying.  She has been on multiple medications including anticholinergics and beta 3 agonist.  Patient had worsening emptying, and due to this, recommendation was made to discontinue Vesicare, her most recent medication.    She has previously been to pelvic floor physical therapy.  Currently she is use timing timed voiding, which is managing her symptomatology relatively well.  She does note exacerbation of her symptoms when bowels are particularly bad.    We discussed other therapies after medication such as posterior tibial nerve stimulation or Botox.  I do have some concern that if we were to give her Botox, she would need to perform intermittent catheterization.  She also has several medications that she has not tried, which we could consider.  She is relatively comfortable with her symptomatology at this time.    Will plan on the following:    -Continue with timed voiding every 2-3 hours.  This seems to be helping your symptom management.    -Continue with bowel regimen of Metamucil and MiraLAX.  Would add just a little more MiraLAX to see if this helps the stool.  Could also incorporate some prune juice.    -If worsening urinary symptoms, would consider posterior tibial nerve stimulation or a different medication.    -Recommend follow up in 6 months for symptoms check and post void residual, sooner if issues.    Contact us in the interim with questions, concerns, or changes in symptomatology.      Signed by:       Nancy Chawla PA-C 1/3/2025 3:49 PM

## 2025-01-03 NOTE — PROGRESS NOTES
[facility]    Hematology/Oncology Established Patient Note      Today's Date: 1/8/2025    Reason for follow-up:  Right breast cancer.    HISTORY OF PRESENT ILLNESS: Angeli Jacobson is a 74 year old female with history of left thigh liposarcoma status post excision in 2000 with subsequent left leg lymphedema and peripheral arterial disease status post redo left common femoral to popliteal artery bypass in January 2019, who presents with the following oncologic history:  1.  11/7/2022: Screening mammogram showed asymmetry in the right breast at 12:00, retroareolar far posterior.  Left breast negative.  2.  11/15/2022: Diagnostic right mammogram showed asymmetry in the posterior right breast, 8 cm from nipple.  Ultrasound of right breast at 12:00, 2 cm from nipple showed a small benign cyst but no definite sonographic correlate for the mammographic asymmetry.  3.  11/16/2022: Stereotactic guided right breast needle biopsy of 8 mm lesion at 3:00, 8 cm from the nipple showed grade 2 invasive ductal carcinoma, ER positive at %, VT positive at 60-70%, HER2 IHC = 2+ equivocal, HER2/compa FISH negative.  4. 12/14/2022: Underwent right breast lumpectomy with right axillary sentinel lymph node excision with Dr. Shelly Carr.  Pathology showed no residual invasive malignancy; 2 microscopic foci of DCIS, grade 2, largest measuring 1.2 mm; margins negative; total 2 right axillary lymph nodes negative.  5. 3/21/2023: Completed adjuvant radiation therapy to the right breast.   6. 4/10/2023: Started anastrozole. Discontinued 6/19/2023 due to weakness, joint stiffness, and brain fog.  7. 8/20/2023: Switched to letrozole.  8. 12/06/2024: Held letrozole due to left trigger finger.  Had prior right trigger finger.    INTERVAL HISTORY:  Angeli reports her polyarthralgia in her fingers/hands have resolved after discontinuation of letrozole.      REVIEW OF SYSTEMS:   14 point ROS was reviewed and is  negative other than as noted above in HPI.       HOME MEDICATIONS:  Current Outpatient Medications   Medication Sig Dispense Refill    calcium carbonate 600 mg-vitamin D 400 units (CALTRATE) 600-400 MG-UNIT per tablet Take 1 chew tab by mouth daily      clopidogrel (PLAVIX) 75 MG tablet Take 1 tablet (75 mg) by mouth daily 90 tablet 3    denosumab (PROLIA) 60 MG/ML SOSY injection       evolocumab (REPATHA) 140 MG/ML prefilled autoinjector Inject 1 mL (140 mg) Subcutaneous every 14 days 6 mL 2    ezetimibe (ZETIA) 10 MG tablet Take 1 tablet (10 mg) by mouth daily 90 tablet 3    letrozole (FEMARA) 2.5 MG tablet Take 1 tablet (2.5 mg) by mouth daily. 90 tablet 3    levothyroxine (SYNTHROID/LEVOTHROID) 75 MCG tablet Take 1 tablet (75 mcg) by mouth daily 90 tablet 3    magnesium oxide 200 MG TABS       multivitamin, therapeutic (THERA-VIT) TABS tablet Take 1 tablet by mouth daily      nitroFURantoin macrocrystal-monohydrate (MACROBID) 100 MG capsule Take 1 capsule after intercourse 30 capsule 0    rivaroxaban ANTICOAGULANT (XARELTO) 2.5 MG TABS tablet Take 1 tablet (2.5 mg) by mouth 2 times daily. 180 tablet 3    rosuvastatin (CRESTOR) 20 MG tablet Take 1 tablet (20 mg) by mouth daily 90 tablet 3    Sharps Container MISC Use as per  instructions for safe needle disposal 1 each 1    solifenacin (VESICARE) 5 MG tablet Take 1 tablet (5 mg) by mouth daily 90 tablet 3         ALLERGIES:  Allergies   Allergen Reactions    Celexa [Citalopram Hydrobromide]      Decreased libido         PAST MEDICAL HISTORY:  Past Medical History:   Diagnosis Date    * * * SBE PROPHYLAXIS * * * 1998    Amox 500mg, take 4 tabs one hour prior to procedure.Takes this because of lymphedema secondary from leg surgey    Antiplatelet or antithrombotic long-term use     Arrhythmia     Central serous retinopathy 2001    Resolved 9/2001    CHRONIC NECK PAIN 1995    Depressive disorder     Depressive disorder, not elsewhere classified 2001     Elevated coronary artery calcium score=2021    Elevated coronary artery calcium score=2021    History of blood transfusion 2019    during left hip pinning, during surgery for patella    Lateral epicondylitis     MIXED HYPERLIPIDEMIA, LDL GOAL <160     LDL goal < 160    Motion sickness     MYXOID LIPOSARCOMA 2000    Left thigh, S/P excision, radiation  at [facility]    Myxoid liposarcoma (HCC) 2004    CHRONIC LEFT THIGH LYMPHEDEMA    Nontoxic multinodular goiter 2005    needs yearly US    ALEXANDER (obstructive sleep apnea) 7/3/2024    Osteoporosis, unspecified     Other chronic pain     fx ribs left     Other lymphedema     left thigh, gets regular PT for this    Overactive bladder     PAD (peripheral artery disease) (H) 2018    PONV (postoperative nausea and vomiting)     SHINGLES     Sprain of lumbosacral (joint) (ligament)     right    Unspecified hearing loss     chronic tinnitus    Unspecified tinnitus      Gynecologic history:  Age of menarche at 11, , age of first pregnancy at 29, 5 years of OCP use, no HRT or fertility treatment.    PAST SURGICAL HISTORY:  Past Surgical History:   Procedure Laterality Date    ARTHROPLASTY HIP Left 10/4/2019    Procedure: Removal of left femoral hardware with a conversion to left total hip arthroplasty using a Biomet Annalisa femoral stem with an OsseoTi acetabular shell and a dual mobility bearing surface;  Surgeon: Spencer Celeste MD;  Location: [facility] OR    BIOPSY  2000    BIOPSY BREAST SEED LOCALIZATION Right 2022    Procedure: right breast tag localized lumpectomy;  Surgeon: Shelly Carr MD;  Location: [facility] OR    BIOPSY NODE SENTINEL Right 2022    Procedure: with right sentinel lymph node biopsy;  Surgeon: Shelly Carr MD;  Location: [facility] OR    BYPASS GRAFT FEMOROPOPLITEAL Left 2019    Procedure: LEFT FEMORAL TO ABOVE KNEE POPLITEAL  BYPASS WITH  POLYTETRAFLUOROETHYLENE GRAFT;  Surgeon: Shade Owens MD;  Location: [facility] OR    COLONOSCOPY N/A 2/5/2016    Procedure: COMBINED COLONOSCOPY, SINGLE OR MULTIPLE BIOPSY/POLYPECTOMY BY BIOPSY;  Surgeon: Varun Stanley MD, MD;  Location:  GI    COLONOSCOPY N/A 12/10/2021    Procedure: COLONOSCOPY, WITH POLYPECTOMIES  USING COLD  SNARE,   CLIP APPLIED X2;  Surgeon: Dayton Luna MD;  Location:  GI    CYSTOSCOPY      EXCISION MALIG LESION>1.25CM  5/2000    Myxoid Liposarcoma      EXPLORE GROIN Right 5/1/2018    Procedure: EXPLORE GROIN;  EMERGENCY RIGHT FEMORAL EXPLORATION WITH FEMORAL ARTERY REPAIR.    EBL: 50mL;  Surgeon: Shade Owens MD;  Location: [facility] OR    HC COLP CERVIX/UPPER VAGINA  07/1997    Negative    HC DILATION/CURETTAGE DIAG/THER NON OB  02/1997    Post menopausal bleeding on HRT, negative    HIP SURGERY Left 04/13/2019    IR ANGIOGRAM THROUGH CATHETER FOLLOW UP  12/20/2018    IR ANGIOGRAM THROUGH CATHETER FOLLOW UP  12/21/2018    IR LOWER EXTREMITY ANGIOGRAM LEFT  12/19/2018    OPEN REDUCTION INTERNAL FIXATION PATELLA Left 12/21/2020    Procedure: Left partial patella fracture excision with advancement of the quadriceps tendon;  Surgeon: Stephen Rhodes MD;  Location: [facility] OR    OPEN REDUCTION INTERNAL FIXATION RODDING INTRAMEDULLARY TIBIA Left 12/21/2020    Procedure: Open reduction intramedullary nailing of left tibia fracture;  Surgeon: Stephen Rhodes MD;  Location: [facility] OR    REMOVE HARDWARE KNEE Left 5/31/2022    Procedure: Left knee patella hardware removal;  Surgeon: Spencer Celeste MD;  Location: [facility] OR    REPAIR TENDON QUADRICEPS Left 7/28/2021    Procedure: Left knee quadricepsplasty with intraoperative patellar fracture requiring open reduction and internal fixation of the patella;  Surgeon: Spencer Celeste MD;  Location: [facility] OR    REPAIR TENDON QUADRICEPS Left 5/31/2022    Procedure: Open left knee VY quadricepsplasty with hardware removal and allograft;   Surgeon: Spencer Celeste MD;  Location: [facility] OR    VASCULAR SURGERY  04/30/2018    Left SFA stent in bypass graft    ZZC APPENDECTOMY      Appendectomy    ZZC NONSPECIFIC PROCEDURE  04/2000    Open Biopsy Left Thigh Liposarcoma    ZZHC COLONOSCOPY THRU STOMA, DIAGNOSTIC  2006    due 2010         SOCIAL HISTORY:  Social History     Socioeconomic History    Marital status:      Spouse name: Chris    Number of children: 3    Years of education: 14    Highest education level: Associate degree: academic program   Occupational History    Occupation: at home     Employer: NONE    Tobacco Use    Smoking status: Never     Passive exposure: Never    Smokeless tobacco: Never   Vaping Use    Vaping status: Never Used   Substance and Sexual Activity    Alcohol use: Never    Drug use: Never    Sexual activity: Yes     Partners: Male     Birth control/protection: Male Surgical     Comment:  had a vasectomy   Other Topics Concern    Parent/sibling w/ CABG, MI or angioplasty before 65F 55M? No   Social History Narrative    Not on file     Social Drivers of Health     Financial Resource Strain: Low Risk  (7/19/2024)    Financial Resource Strain     Within the past 12 months, have you or your family members you live with been unable to get utilities (heat, electricity) when it was really needed?: No   Food Insecurity: Low Risk  (7/19/2024)    Food Insecurity     Within the past 12 months, did you worry that your food would run out before you got money to buy more?: No     Within the past 12 months, did the food you bought just not last and you didn t have money to get more?: No   Transportation Needs: Low Risk  (7/19/2024)    Transportation Needs     Within the past 12 months, has lack of transportation kept you from medical appointments, getting your medicines, non-medical meetings or appointments, work, or from getting things that you need?: No   Physical Activity: Inactive (7/19/2024)    Exercise Vital Sign      Days of Exercise per Week: 0 days     Minutes of Exercise per Session: 0 min   Stress: No Stress Concern Present (2024)    [employer] - Occupational Stress Questionnaire     Feeling of Stress : Only a little   Social Connections: Unknown (2024)    Social Connection and Isolation Panel [NHANES]     Frequency of Communication with Friends and Family: Not on file     Frequency of Social Gatherings with Friends and Family: Once a week     Attends Congregational Services: Not on file     Active Member of Clubs or Organizations: Not on file     Attends Club or Organization Meetings: Not on file     Marital Status: Not on file   Interpersonal Safety: Low Risk  (2024)    Interpersonal Safety     Do you feel physically and emotionally safe where you currently live?: Yes     Within the past 12 months, have you been hit, slapped, kicked or otherwise physically hurt by someone?: No     Within the past 12 months, have you been humiliated or emotionally abused in other ways by your partner or ex-partner?: No   Housing Stability: Low Risk  (2024)    Housing Stability     Do you have housing? : Yes     Are you worried about losing your housing?: No         FAMILY HISTORY:  Family History   Problem Relation Age of Onset    C.A.D. Father         MI 57    Alcohol/Drug Father         etoh    Coronary Artery Disease Father     Obesity Mother     Osteoporosis Mother     Colon Cancer Brother 70    Hyperlipidemia Son     Anxiety Disorder Son     Hyperlipidemia Son         very high, experimental drug    C.A.D. Paternal Grandmother         ascvd    Diabetes Maternal Grandmother     Cancer Maternal Grandmother     C.A.D. Paternal Uncle         Mi  age 48    Cancer Maternal Aunt         pancreatic CA    Hyperlipidemia Son     Hyperlipidemia Cousin      Brother with colon cancer.  Negative for breast, ovarian or prostate cancer.    PHYSICAL EXAM:  Vital signs:  /81   Pulse 81   Resp  "16   Ht 1.676 m (5' 6\")   Wt 71.2 kg (157 lb)   LMP  (LMP Unknown)   SpO2 98%   BMI 25.34 kg/m     GENERAL/CONSTITUTIONAL: No acute distress.  EYES: No erythema or scleral icterus.  LYMPH: No cervical, supraclavicular, axillary adenopathy.   BREAST: No palpable masses in either breast. Nipples are everted bilaterally with no discharge. No erythema, ulceration, or dimpling of the skin.  RESPIRATORY: No audible cough or wheezing.   GASTROINTESTINAL: No hepatosplenomegaly, masses, or tenderness. No guarding.  No distention.  MUSCULOSKELETAL: Warm and well-perfused, no cyanosis, clubbing; chronic left lower extremity edema.  NEUROLOGIC: No focal motor deficits. Alert, oriented, answers questions appropriately.  INTEGUMENTARY: No rashes or jaundice.  GAIT: Steady, does not use assistive device    LABS:  CBC RESULTS:   Recent Labs   Lab Test 09/27/24  0836   WBC 4.5   RBC 4.70   HGB 14.1   HCT 43.2   MCV 92   MCH 30.0   MCHC 32.6   RDW 13.1            Last Comprehensive Metabolic Panel:  Sodium   Date Value Ref Range Status   12/31/2024 143 135 - 145 mmol/L Final   05/17/2021 142 133 - 144 mmol/L Final     Potassium   Date Value Ref Range Status   12/31/2024 4.3 3.4 - 5.3 mmol/L Final   05/26/2022 3.9 3.4 - 5.3 mmol/L Final   05/17/2021 4.3 3.4 - 5.3 mmol/L Final     Chloride   Date Value Ref Range Status   12/31/2024 108 (H) 98 - 107 mmol/L Final   05/26/2022 106 94 - 109 mmol/L Final   05/17/2021 107 94 - 109 mmol/L Final     Carbon Dioxide   Date Value Ref Range Status   05/17/2021 32 20 - 32 mmol/L Final     Carbon Dioxide (CO2)   Date Value Ref Range Status   12/31/2024 26 22 - 29 mmol/L Final   05/26/2022 29 20 - 32 mmol/L Final     Anion Gap   Date Value Ref Range Status   12/31/2024 9 7 - 15 mmol/L Final   05/26/2022 4 3 - 14 mmol/L Final   05/17/2021 3 3 - 14 mmol/L Final     Glucose   Date Value Ref Range Status   12/31/2024 102 (H) 70 - 99 mg/dL Final   06/01/2022 137 (H) 70 - 99 mg/dL Final "   05/17/2021 78 70 - 99 mg/dL Final     Comment:     Fasting specimen     Urea Nitrogen   Date Value Ref Range Status   12/31/2024 12.1 8.0 - 23.0 mg/dL Final   05/26/2022 17 7 - 30 mg/dL Final   05/17/2021 13 7 - 30 mg/dL Final     Creatinine   Date Value Ref Range Status   12/31/2024 0.62 0.51 - 0.95 mg/dL Final   05/17/2021 0.75 0.52 - 1.04 mg/dL Final     GFR Estimate   Date Value Ref Range Status   12/31/2024 >90 >60 mL/min/1.73m2 Final     Comment:     eGFR calculated using 2021 CKD-EPI equation.   05/17/2021 81 >60 mL/min/[1.73_m2] Final     Comment:     Non  GFR Calc  Starting 12/18/2018, serum creatinine based estimated GFR (eGFR) will be   calculated using the Chronic Kidney Disease Epidemiology Collaboration   (CKD-EPI) equation.       Calcium   Date Value Ref Range Status   12/31/2024 9.5 8.8 - 10.4 mg/dL Final     Comment:     Reference intervals for this test were updated on 7/16/2024 to reflect our healthy population more accurately. There may be differences in the flagging of prior results with similar values performed with this method. Those prior results can be interpreted in the context of the updated reference intervals.   05/17/2021 9.3 8.5 - 10.1 mg/dL Final     Bilirubin Total   Date Value Ref Range Status   07/24/2024 0.3 <=1.2 mg/dL Final   05/17/2021 0.4 0.2 - 1.3 mg/dL Final     Alkaline Phosphatase   Date Value Ref Range Status   07/24/2024 59 40 - 150 U/L Final   05/17/2021 88 40 - 150 U/L Final     ALT   Date Value Ref Range Status   09/27/2024 25 0 - 50 U/L Final   05/17/2021 26 0 - 50 U/L Final     AST   Date Value Ref Range Status   07/24/2024 28 0 - 45 U/L Final   05/17/2021 20 0 - 45 U/L Final       PATHOLOGY:  None new.    IMAGING:  Reviewed as per A/P.    ASSESSMENT/PLAN:  Angeli Jacobson is a 74 year old female with the following issues:  1. Pathologic prognostic stage IA, hT2k-P5-Q2, grade 2 invasive ductal carcinoma of the right upper outer breast, ER  positive (%), IL positive (70%), HER2/compa FISH negative  2. Polyarthralgia  -Angeli is s/p right lumpectomy and radiation completed 3/21/2023.    --She has no clinical evidence for recurrent breast cancer by physical exam or mammogram reviewed from 11/29/2024.  --I advised a total of 5 years of hormone blockade therapy.  --Due to increase in mental and physical fatigue, she switched from anastrozole to letrozole on 8/30/2023 but held on 12/06/2024 due to recurrent trigger finger and polyarthralgia.  --Discussed options of trying exemestane, restarting letrozole, tamoxifen, or discontinuing hormone blockade therapy entirely, given her overall low risk for recurrence.  --She does not prefer tamoxifen given concern for increased risk of blood clots and other potential serious adverse effects.  --Ultimately, she has elected to restart letrozole.  --Advised exercise and stretching to counteract musculoskeletal AI side effects.  Also discussed trying red light therapy and use of stress ball for her hands.  --Due for next annual bilateral mammogram for 11/2025.    3.  History of myxoid liposarcoma of left thigh  - Status post excision and radiation completed in 2000.  She did not receive chemotherapy for her liposarcoma.  This has resulted in left lower extremity lymphedema.  - Continue lymphedema therapy and compression stockings.    4.  Peripheral arterial disease  -Has history of left lower extremity peripheral arterial disease and is status post femoral-popliteal bypass with later stent to bypass.  She is on aspirin and Plavix.    5. Osteoporosis  --History of taking alendronate, off for past year.  - DEXA scan from 12/22/2022 and 12/27/2023 showed osteoporosis. Machines were not the same and therefore results not comparable.  -- 12/18/2024 DEXA showed osteopenia with 1.8% increase in lumbar spine BMD and 1.4% increase in the right hip BMD.  - Continue adequate calcium and vitamin D, denosumab/Prolia, next  due 2/2025 but will push out to 3/2025 as she will be out of town for that month.  --She has a follow-up with endocrinology.    Return in 6 months.    Emilia Angulo MD  [facility] Hematology/Oncology     Total time spent today: 40 minutes in chart review, patient evaluation, counseling, documentation, test and/or medication/prescription orders, and coordination of care.     The longitudinal plan of care for the diagnosis(es)/condition(s) as documented were addressed during this visit. Due to the added complexity in care, I will continue to support Angeli in the subsequent management and with ongoing continuity of care.

## 2025-01-07 ASSESSMENT — ENCOUNTER SYMPTOMS
FEVER: 0
VOMITING: 0
DYSURIA: 0
FREQUENCY: 1
CHILLS: 0
CONSTIPATION: 1
NAUSEA: 0
HEMATURIA: 0

## 2025-01-08 ENCOUNTER — ONCOLOGY VISIT (OUTPATIENT)
Dept: ONCOLOGY | Facility: CLINIC | Age: 75
End: 2025-01-08
Attending: INTERNAL MEDICINE
Payer: COMMERCIAL

## 2025-01-08 VITALS
HEART RATE: 81 BPM | SYSTOLIC BLOOD PRESSURE: 128 MMHG | BODY MASS INDEX: 25.23 KG/M2 | RESPIRATION RATE: 16 BRPM | HEIGHT: 66 IN | DIASTOLIC BLOOD PRESSURE: 81 MMHG | OXYGEN SATURATION: 98 % | WEIGHT: 157 LBS

## 2025-01-08 DIAGNOSIS — M80.00XD AGE-RELATED OSTEOPOROSIS WITH CURRENT PATHOLOGICAL FRACTURE WITH ROUTINE HEALING, SUBSEQUENT ENCOUNTER: ICD-10-CM

## 2025-01-08 DIAGNOSIS — Z17.0 MALIGNANT NEOPLASM OF UPPER-OUTER QUADRANT OF RIGHT BREAST IN FEMALE, ESTROGEN RECEPTOR POSITIVE (H): Primary | ICD-10-CM

## 2025-01-08 DIAGNOSIS — C50.411 MALIGNANT NEOPLASM OF UPPER-OUTER QUADRANT OF RIGHT BREAST IN FEMALE, ESTROGEN RECEPTOR POSITIVE (H): Primary | ICD-10-CM

## 2025-01-08 PROCEDURE — G0463 HOSPITAL OUTPT CLINIC VISIT: HCPCS | Performed by: INTERNAL MEDICINE

## 2025-01-08 ASSESSMENT — PAIN SCALES - GENERAL: PAINLEVEL_OUTOF10: NO PAIN (0)

## 2025-01-08 NOTE — LETTER
1/8/2025      Angeli Jacobson  [id number]  [facility] [address]      Dear Colleague,    Thank you for referring your patient, Angeli Jacobson, to the [facility] CANCER [facility]. Please see a copy of my visit note below.    [facility] Cancer [facility]    Hematology/Oncology Established Patient Note      Today's Date: 1/8/2025    Reason for follow-up:  Right breast cancer.    HISTORY OF PRESENT ILLNESS: Angeli Jacobson is a 74 year old female with history of left thigh liposarcoma status post excision in 2000 with subsequent left leg lymphedema and peripheral arterial disease status post redo left common femoral to popliteal artery bypass in January 2019, who presents with the following oncologic history:  1.  11/7/2022: Screening mammogram showed asymmetry in the right breast at 12:00, retroareolar far posterior.  Left breast negative.  2.  11/15/2022: Diagnostic right mammogram showed asymmetry in the posterior right breast, 8 cm from nipple.  Ultrasound of right breast at 12:00, 2 cm from nipple showed a small benign cyst but no definite sonographic correlate for the mammographic asymmetry.  3.  11/16/2022: Stereotactic guided right breast needle biopsy of 8 mm lesion at 3:00, 8 cm from the nipple showed grade 2 invasive ductal carcinoma, ER positive at %, ME positive at 60-70%, HER2 IHC = 2+ equivocal, HER2/compa FISH negative.  4. 12/14/2022: Underwent right breast lumpectomy with right axillary sentinel lymph node excision with Dr. Shelly Carr.  Pathology showed no residual invasive malignancy; 2 microscopic foci of DCIS, grade 2, largest measuring 1.2 mm; margins negative; total 2 right axillary lymph nodes negative.  5. 3/21/2023: Completed adjuvant radiation therapy to the right breast.   6. 4/10/2023: Started anastrozole. Discontinued 6/19/2023 due to weakness, joint stiffness, and brain fog.  7. 8/20/2023: Switched to letrozole.  8. 12/06/2024: Held letrozole  due to left trigger finger.  Had prior right trigger finger.    INTERVAL HISTORY:  Angeli reports her polyarthralgia in her fingers/hands have resolved after discontinuation of letrozole.      REVIEW OF SYSTEMS:   14 point ROS was reviewed and is negative other than as noted above in HPI.       HOME MEDICATIONS:  Current Outpatient Medications   Medication Sig Dispense Refill     calcium carbonate 600 mg-vitamin D 400 units (CALTRATE) 600-400 MG-UNIT per tablet Take 1 chew tab by mouth daily       clopidogrel (PLAVIX) 75 MG tablet Take 1 tablet (75 mg) by mouth daily 90 tablet 3     denosumab (PROLIA) 60 MG/ML SOSY injection        evolocumab (REPATHA) 140 MG/ML prefilled autoinjector Inject 1 mL (140 mg) Subcutaneous every 14 days 6 mL 2     ezetimibe (ZETIA) 10 MG tablet Take 1 tablet (10 mg) by mouth daily 90 tablet 3     letrozole (FEMARA) 2.5 MG tablet Take 1 tablet (2.5 mg) by mouth daily. 90 tablet 3     levothyroxine (SYNTHROID/LEVOTHROID) 75 MCG tablet Take 1 tablet (75 mcg) by mouth daily 90 tablet 3     magnesium oxide 200 MG TABS        multivitamin, therapeutic (THERA-VIT) TABS tablet Take 1 tablet by mouth daily       nitroFURantoin macrocrystal-monohydrate (MACROBID) 100 MG capsule Take 1 capsule after intercourse 30 capsule 0     rivaroxaban ANTICOAGULANT (XARELTO) 2.5 MG TABS tablet Take 1 tablet (2.5 mg) by mouth 2 times daily. 180 tablet 3     rosuvastatin (CRESTOR) 20 MG tablet Take 1 tablet (20 mg) by mouth daily 90 tablet 3     Sharps Container MISC Use as per  instructions for safe needle disposal 1 each 1     solifenacin (VESICARE) 5 MG tablet Take 1 tablet (5 mg) by mouth daily 90 tablet 3         ALLERGIES:  Allergies   Allergen Reactions     Celexa [Citalopram Hydrobromide]      Decreased libido         PAST MEDICAL HISTORY:  Past Medical History:   Diagnosis Date     * * * SBE PROPHYLAXIS * * * 1998    Amox 500mg, take 4 tabs one hour prior to procedure.Takes this because  of lymphedema secondary from leg surgey     Antiplatelet or antithrombotic long-term use      Arrhythmia      Central serous retinopathy 2001    Resolved 2001     CHRONIC NECK PAIN      Depressive disorder      Depressive disorder, not elsewhere classified      Elevated coronary artery calcium score=2021     Elevated coronary artery calcium score=2021     History of blood transfusion 2019    during left hip pinning, during surgery for patella     Lateral epicondylitis      MIXED HYPERLIPIDEMIA, LDL GOAL <160     LDL goal < 160     Motion sickness      MYXOID LIPOSARCOMA 2000    Left thigh, S/P excision, radiation  at [facility]     Myxoid liposarcoma (HCC) 2004    CHRONIC LEFT THIGH LYMPHEDEMA     Nontoxic multinodular goiter 2005    needs yearly US     ALEXANDER (obstructive sleep apnea) 7/3/2024     Osteoporosis, unspecified      Other chronic pain     fx ribs left      Other lymphedema 2000    left thigh, gets regular PT for this     Overactive bladder      PAD (peripheral artery disease) (H) 2018     PONV (postoperative nausea and vomiting)      SHINGLES      Sprain of lumbosacral (joint) (ligament)     right     Unspecified hearing loss     chronic tinnitus     Unspecified tinnitus      Gynecologic history:  Age of menarche at 11, , age of first pregnancy at 29, 5 years of OCP use, no HRT or fertility treatment.    PAST SURGICAL HISTORY:  Past Surgical History:   Procedure Laterality Date     ARTHROPLASTY HIP Left 10/4/2019    Procedure: Removal of left femoral hardware with a conversion to left total hip arthroplasty using a Biomet Annalisa femoral stem with an OsseoTi acetabular shell and a dual mobility bearing surface;  Surgeon: Spencer Celeste MD;  Location: [facility] OR     BIOPSY  2000     BIOPSY BREAST SEED LOCALIZATION Right 2022    Procedure: right breast tag localized lumpectomy;  Surgeon: Shelly Carr,  MD;  Location:  OR     BIOPSY NODE SENTINEL Right 12/14/2022    Procedure: with right sentinel lymph node biopsy;  Surgeon: Shelly aCrr MD;  Location:  OR     BYPASS GRAFT FEMOROPOPLITEAL Left 1/21/2019    Procedure: LEFT FEMORAL TO ABOVE KNEE POPLITEAL  BYPASS WITH POLYTETRAFLUOROETHYLENE GRAFT;  Surgeon: Shade Owens MD;  Location:  OR     COLONOSCOPY N/A 2/5/2016    Procedure: COMBINED COLONOSCOPY, SINGLE OR MULTIPLE BIOPSY/POLYPECTOMY BY BIOPSY;  Surgeon: Varun Stanley MD, MD;  Location:  GI     COLONOSCOPY N/A 12/10/2021    Procedure: COLONOSCOPY, WITH POLYPECTOMIES  USING COLD  SNARE,   CLIP APPLIED X2;  Surgeon: Dayton Luna MD;  Location:  GI     CYSTOSCOPY       EXCISION MALIG LESION>1.25CM  5/2000    Myxoid Liposarcoma       EXPLORE GROIN Right 5/1/2018    Procedure: EXPLORE GROIN;  EMERGENCY RIGHT FEMORAL EXPLORATION WITH FEMORAL ARTERY REPAIR.    EBL: 50mL;  Surgeon: Shade Owens MD;  Location:  OR     HC COLP CERVIX/UPPER VAGINA  07/1997    Negative     HC DILATION/CURETTAGE DIAG/THER NON OB  02/1997    Post menopausal bleeding on HRT, negative     HIP SURGERY Left 04/13/2019     IR ANGIOGRAM THROUGH CATHETER FOLLOW UP  12/20/2018     IR ANGIOGRAM THROUGH CATHETER FOLLOW UP  12/21/2018     IR LOWER EXTREMITY ANGIOGRAM LEFT  12/19/2018     OPEN REDUCTION INTERNAL FIXATION PATELLA Left 12/21/2020    Procedure: Left partial patella fracture excision with advancement of the quadriceps tendon;  Surgeon: Stephen Rhodes MD;  Location:  OR     OPEN REDUCTION INTERNAL FIXATION RODDING INTRAMEDULLARY TIBIA Left 12/21/2020    Procedure: Open reduction intramedullary nailing of left tibia fracture;  Surgeon: Stephen Rhodes MD;  Location:  OR     REMOVE HARDWARE KNEE Left 5/31/2022    Procedure: Left knee patella hardware removal;  Surgeon: Spencer Celeste MD;  Location:  OR     REPAIR TENDON QUADRICEPS Left 7/28/2021    Procedure: Left knee  quadricepsplasty with intraoperative patellar fracture requiring open reduction and internal fixation of the patella;  Surgeon: Spencer Celeste MD;  Location: [facility] OR     REPAIR TENDON QUADRICEPS Left 5/31/2022    Procedure: Open left knee VY quadricepsplasty with hardware removal and allograft;  Surgeon: Spencer Celeste MD;  Location: [facility] OR     VASCULAR SURGERY  04/30/2018    Left SFA stent in bypass graft     ZZC APPENDECTOMY      Appendectomy     ZZC NONSPECIFIC PROCEDURE  04/2000    Open Biopsy Left Thigh Liposarcoma     ZZHC COLONOSCOPY THRU STOMA, DIAGNOSTIC  2006    due 2010         SOCIAL HISTORY:  Social History     Socioeconomic History     Marital status:      Spouse name: Chris     Number of children: 3     Years of education: 14     Highest education level: Associate degree: academic program   Occupational History     Occupation: at home     Employer: NONE    Tobacco Use     Smoking status: Never     Passive exposure: Never     Smokeless tobacco: Never   Vaping Use     Vaping status: Never Used   Substance and Sexual Activity     Alcohol use: Never     Drug use: Never     Sexual activity: Yes     Partners: Male     Birth control/protection: Male Surgical     Comment:  had a vasectomy   Other Topics Concern     Parent/sibling w/ CABG, MI or angioplasty before 65F 55M? No   Social History Narrative     Not on file     Social Drivers of Health     Financial Resource Strain: Low Risk  (7/19/2024)    Financial Resource Strain      Within the past 12 months, have you or your family members you live with been unable to get utilities (heat, electricity) when it was really needed?: No   Food Insecurity: Low Risk  (7/19/2024)    Food Insecurity      Within the past 12 months, did you worry that your food would run out before you got money to buy more?: No      Within the past 12 months, did the food you bought just not last and you didn t have money to get more?: No    Transportation Needs: Low Risk  (7/19/2024)    Transportation Needs      Within the past 12 months, has lack of transportation kept you from medical appointments, getting your medicines, non-medical meetings or appointments, work, or from getting things that you need?: No   Physical Activity: Inactive (7/19/2024)    Exercise Vital Sign      Days of Exercise per Week: 0 days      Minutes of Exercise per Session: 0 min   Stress: No Stress Concern Present (7/19/2024)    [employer] - Occupational Stress Questionnaire      Feeling of Stress : Only a little   Social Connections: Unknown (7/19/2024)    Social Connection and Isolation Panel [NHANES]      Frequency of Communication with Friends and Family: Not on file      Frequency of Social Gatherings with Friends and Family: Once a week      Attends [employer] Services: Not on file      Active Member of Clubs or Organizations: Not on file      Attends Club or Organization Meetings: Not on file      Marital Status: Not on file   Interpersonal Safety: Low Risk  (7/23/2024)    Interpersonal Safety      Do you feel physically and emotionally safe where you currently live?: Yes      Within the past 12 months, have you been hit, slapped, kicked or otherwise physically hurt by someone?: No      Within the past 12 months, have you been humiliated or emotionally abused in other ways by your partner or ex-partner?: No   Housing Stability: Low Risk  (7/19/2024)    Housing Stability      Do you have housing? : Yes      Are you worried about losing your housing?: No         FAMILY HISTORY:  Family History   Problem Relation Age of Onset     C.A.D. Father         MI 57     Alcohol/Drug Father         etoh     Coronary Artery Disease Father      Obesity Mother      Osteoporosis Mother      Colon Cancer Brother 70     Hyperlipidemia Son      Anxiety Disorder Son      Hyperlipidemia Son         very high, experimental drug     C.A.D. Paternal Grandmother      "    ascvd     Diabetes Maternal Grandmother      Cancer Maternal Grandmother      DANIELA. Paternal Uncle         Mi  age 48     Cancer Maternal Aunt         pancreatic CA     Hyperlipidemia Son      Hyperlipidemia Cousin      Brother with colon cancer.  Negative for breast, ovarian or prostate cancer.    PHYSICAL EXAM:  Vital signs:  /81   Pulse 81   Resp 16   Ht 1.676 m (5' 6\")   Wt 71.2 kg (157 lb)   LMP  (LMP Unknown)   SpO2 98%   BMI 25.34 kg/m     GENERAL/CONSTITUTIONAL: No acute distress.  EYES: No erythema or scleral icterus.  LYMPH: No cervical, supraclavicular, axillary adenopathy.   BREAST: No palpable masses in either breast. Nipples are everted bilaterally with no discharge. No erythema, ulceration, or dimpling of the skin.  RESPIRATORY: No audible cough or wheezing.   GASTROINTESTINAL: No hepatosplenomegaly, masses, or tenderness. No guarding.  No distention.  MUSCULOSKELETAL: Warm and well-perfused, no cyanosis, clubbing; chronic left lower extremity edema.  NEUROLOGIC: No focal motor deficits. Alert, oriented, answers questions appropriately.  INTEGUMENTARY: No rashes or jaundice.  GAIT: Steady, does not use assistive device    LABS:  CBC RESULTS:   Recent Labs   Lab Test 24  0836   WBC 4.5   RBC 4.70   HGB 14.1   HCT 43.2   MCV 92   MCH 30.0   MCHC 32.6   RDW 13.1            Last Comprehensive Metabolic Panel:  Sodium   Date Value Ref Range Status   2024 143 135 - 145 mmol/L Final   2021 142 133 - 144 mmol/L Final     Potassium   Date Value Ref Range Status   2024 4.3 3.4 - 5.3 mmol/L Final   2022 3.9 3.4 - 5.3 mmol/L Final   2021 4.3 3.4 - 5.3 mmol/L Final     Chloride   Date Value Ref Range Status   2024 108 (H) 98 - 107 mmol/L Final   2022 106 94 - 109 mmol/L Final   2021 107 94 - 109 mmol/L Final     Carbon Dioxide   Date Value Ref Range Status   2021 32 20 - 32 mmol/L Final     Carbon Dioxide (CO2)   Date " Value Ref Range Status   12/31/2024 26 22 - 29 mmol/L Final   05/26/2022 29 20 - 32 mmol/L Final     Anion Gap   Date Value Ref Range Status   12/31/2024 9 7 - 15 mmol/L Final   05/26/2022 4 3 - 14 mmol/L Final   05/17/2021 3 3 - 14 mmol/L Final     Glucose   Date Value Ref Range Status   12/31/2024 102 (H) 70 - 99 mg/dL Final   06/01/2022 137 (H) 70 - 99 mg/dL Final   05/17/2021 78 70 - 99 mg/dL Final     Comment:     Fasting specimen     Urea Nitrogen   Date Value Ref Range Status   12/31/2024 12.1 8.0 - 23.0 mg/dL Final   05/26/2022 17 7 - 30 mg/dL Final   05/17/2021 13 7 - 30 mg/dL Final     Creatinine   Date Value Ref Range Status   12/31/2024 0.62 0.51 - 0.95 mg/dL Final   05/17/2021 0.75 0.52 - 1.04 mg/dL Final     GFR Estimate   Date Value Ref Range Status   12/31/2024 >90 >60 mL/min/1.73m2 Final     Comment:     eGFR calculated using 2021 CKD-EPI equation.   05/17/2021 81 >60 mL/min/[1.73_m2] Final     Comment:     Non  GFR Calc  Starting 12/18/2018, serum creatinine based estimated GFR (eGFR) will be   calculated using the Chronic Kidney Disease Epidemiology Collaboration   (CKD-EPI) equation.       Calcium   Date Value Ref Range Status   12/31/2024 9.5 8.8 - 10.4 mg/dL Final     Comment:     Reference intervals for this test were updated on 7/16/2024 to reflect our healthy population more accurately. There may be differences in the flagging of prior results with similar values performed with this method. Those prior results can be interpreted in the context of the updated reference intervals.   05/17/2021 9.3 8.5 - 10.1 mg/dL Final     Bilirubin Total   Date Value Ref Range Status   07/24/2024 0.3 <=1.2 mg/dL Final   05/17/2021 0.4 0.2 - 1.3 mg/dL Final     Alkaline Phosphatase   Date Value Ref Range Status   07/24/2024 59 40 - 150 U/L Final   05/17/2021 88 40 - 150 U/L Final     ALT   Date Value Ref Range Status   09/27/2024 25 0 - 50 U/L Final   05/17/2021 26 0 - 50 U/L Final     AST    Date Value Ref Range Status   07/24/2024 28 0 - 45 U/L Final   05/17/2021 20 0 - 45 U/L Final       PATHOLOGY:  None new.    IMAGING:  Reviewed as per A/P.    ASSESSMENT/PLAN:  Angeli Jacobson is a 74 year old female with the following issues:  1. Pathologic prognostic stage IA, [id number], grade 2 invasive ductal carcinoma of the right upper outer breast, ER positive (%), PA positive (70%), HER2/compa FISH negative  2. Polyarthralgia  -Angeli is s/p right lumpectomy and radiation completed 3/21/2023.    --She has no clinical evidence for recurrent breast cancer by physical exam or mammogram reviewed from 11/29/2024.  --I advised a total of 5 years of hormone blockade therapy.  --Due to increase in mental and physical fatigue, she switched from anastrozole to letrozole on 8/30/2023 but held on 12/06/2024 due to recurrent trigger finger and polyarthralgia.  --Discussed options of trying exemestane, restarting letrozole, tamoxifen, or discontinuing hormone blockade therapy entirely, given her overall low risk for recurrence.  --She does not prefer tamoxifen given concern for increased risk of blood clots and other potential serious adverse effects.  --Ultimately, she has elected to restart letrozole.  --Advised exercise and stretching to counteract musculoskeletal AI side effects.  Also discussed trying red light therapy and use of stress ball for her hands.  --Due for next annual bilateral mammogram for 11/2025.    3.  History of myxoid liposarcoma of left thigh  - Status post excision and radiation completed in 2000.  She did not receive chemotherapy for her liposarcoma.  This has resulted in left lower extremity lymphedema.  - Continue lymphedema therapy and compression stockings.    4.  Peripheral arterial disease  -Has history of left lower extremity peripheral arterial disease and is status post femoral-popliteal bypass with later stent to bypass.  She is on aspirin and Plavix.    5.  "Osteoporosis  --History of taking alendronate, off for past year.  - DEXA scan from 12/22/2022 and 12/27/2023 showed osteoporosis. Machines were not the same and therefore results not comparable.  -- 12/18/2024 DEXA showed osteopenia with 1.8% increase in lumbar spine BMD and 1.4% increase in the right hip BMD.  - Continue adequate calcium and vitamin D, denosumab/Prolia, next due today.  --She has a follow-up with endocrinology.    Return in 6 months.    Emilia Angulo MD  [facility] Hematology/Oncology     Total time spent today: 40 minutes in chart review, patient evaluation, counseling, documentation, test and/or medication/prescription orders, and coordination of care.     The longitudinal plan of care for the diagnosis(es)/condition(s) as documented were addressed during this visit. Due to the added complexity in care, I will continue to support Angeli in the subsequent management and with ongoing continuity of care.      Oncology Rooming Note    January 8, 2025 10:08 AM   Angeli PARTH Jacobson is a 74 year old female who presents for:    Chief Complaint   Patient presents with     Oncology Clinic Visit     Initial Vitals: LMP  (LMP Unknown)  Estimated body mass index is 25.34 kg/m  as calculated from the following:    Height as of 1/3/25: 1.676 m (5' 6\").    Weight as of 1/3/25: 71.2 kg (157 lb). There is no height or weight on file to calculate BSA.  Data Unavailable Comment: Data Unavailable   No LMP recorded (lmp unknown). Patient is postmenopausal.  Allergies reviewed: Yes  Medications reviewed: Yes    Medications: Medication refills not needed today.  Pharmacy name entered into [employer]:    [id number] [place], [place] - [address] [id number] - [place], [place] - [address] [facility]  [id number] [place], [place] - [address] [facility]    Frailty Screening:   Is the patient here for a new oncology consult visit in cancer care? 2. No        Bharti Moreland, " MA              Again, thank you for allowing me to participate in the care of your patient.        Sincerely,        Emilia Angulo MD    Electronically signed

## 2025-01-08 NOTE — PROGRESS NOTES
"Oncology Rooming Note    January 8, 2025 10:08 AM   Angeli Jacobson is a 74 year old female who presents for:    Chief Complaint   Patient presents with    Oncology Clinic Visit     Initial Vitals: LMP  (LMP Unknown)  Estimated body mass index is 25.34 kg/m  as calculated from the following:    Height as of 1/3/25: 1.676 m (5' 6\").    Weight as of 1/3/25: 71.2 kg (157 lb). There is no height or weight on file to calculate BSA.  Data Unavailable Comment: Data Unavailable   No LMP recorded (lmp unknown). Patient is postmenopausal.  Allergies reviewed: Yes  Medications reviewed: Yes    Medications: Medication refills not needed today.  Pharmacy name entered into [employer]:    [id number] [place] - [address] [id number] - [place] - [address] [facility]  [id number] [place], [place] - [address] [facility]    Frailty Screening:   Is the patient here for a new oncology consult visit in cancer care? 2. No        Bharti Moreland MA            "

## 2025-01-14 DIAGNOSIS — N32.81 OAB (OVERACTIVE BLADDER): Primary | ICD-10-CM

## 2025-01-14 RX ORDER — DARIFENACIN 7.5 MG/1
7.5 TABLET, EXTENDED RELEASE ORAL DAILY
Qty: 30 TABLET | Refills: 0 | Status: SHIPPED | OUTPATIENT
Start: 2025-01-14

## 2025-01-16 ENCOUNTER — TELEPHONE (OUTPATIENT)
Dept: SLEEP MEDICINE | Facility: CLINIC | Age: 75
End: 2025-01-16
Payer: COMMERCIAL

## 2025-01-16 NOTE — TELEPHONE ENCOUNTER
Spoke with pt spouse concerning a voicemail pt left regarding an upcoming appointment. Pt was under impression she had an upcoming appointment with her sleep provider. Informed pt spouse she does not have any appointments scheduled. Pt spouse states understanding and no further concerns were raised.

## 2025-01-21 ENCOUNTER — OFFICE VISIT (OUTPATIENT)
Dept: ENDOCRINOLOGY | Facility: CLINIC | Age: 75
End: 2025-01-21
Payer: COMMERCIAL

## 2025-01-21 VITALS
OXYGEN SATURATION: 100 % | HEART RATE: 76 BPM | BODY MASS INDEX: 26.29 KG/M2 | WEIGHT: 162.9 LBS | DIASTOLIC BLOOD PRESSURE: 83 MMHG | SYSTOLIC BLOOD PRESSURE: 146 MMHG

## 2025-01-21 DIAGNOSIS — E21.3 HYPERPARATHYROIDISM: ICD-10-CM

## 2025-01-21 DIAGNOSIS — M80.00XD AGE-RELATED OSTEOPOROSIS WITH CURRENT PATHOLOGICAL FRACTURE WITH ROUTINE HEALING, SUBSEQUENT ENCOUNTER: Primary | ICD-10-CM
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
## 2025-01-21 NOTE — PATIENT INSTRUCTIONS
## Osteoporosis on Fosamax 7907-6174, restarted Fosamax 0917-1410, on Prolia 1/2023  ## ?primary hyperparathyroidism, so far no evidence of elevated urine calcium or parathyroid scan  ## Thyroid nodules  Labs and imaging are not definite for primary hyperparathyroidism.   Currently normal serum and 24 h urine calcium.  Parathyroid hormone level is mildly elevated.  Ultrasound shows possible parathyroid nodules. DDx thyroid nodules.  Parathyroid scan does not show parathyroid adenoma  Most recent bone density test 12/2024 lowest T-score -2.3 at at hip, stable compare to 2023  -- continue Prolia for now, needed to be every 6 and no longer than 7 months (otherwise, can have rebound bone loss and causes multiple spine fractures)  -- continue calcium and vitamin D  -- I will be in communication with Dr. Angulo is she is ok with switching Prolia to Reclast 8/2025    Follow up 8/2025 with lab and US prior    https://www.bonehealthandosteoporosis.org/    Osteoporosis medications.  We discussed about antiresorption and anabolic. Reviewed bisphosphonate (Fosamax, Reclast), Prolia. Anabolic (bone builder) is not normally used in patients with breast cancer.  We reviewed potential adverse effects of Reclast, and Prolia  to include osteonecrosis of the jaw, atypical femoral fracture. For Reclast-risk of flulike reaction. Prolia, multiple spines fractures if medication was not given on schedule.    Please let your dentist know that you're on Prolia and likely switch to Reclast later this year.
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
## 2025-01-22 DIAGNOSIS — N39.0 RECURRENT UTI: ICD-10-CM

## 2025-01-23 RX ORDER — NITROFURANTOIN 25; 75 MG/1; MG/1
CAPSULE ORAL
Qty: 30 CAPSULE | Refills: 0 | Status: SHIPPED | OUTPATIENT
Start: 2025-01-23

## 2025-01-24 ENCOUNTER — MYC MEDICAL ADVICE (OUTPATIENT)
Dept: OTHER | Facility: CLINIC | Age: 75
End: 2025-01-24
Payer: COMMERCIAL

## 2025-01-25 ENCOUNTER — HEALTH MAINTENANCE LETTER (OUTPATIENT)
Age: 75
End: 2025-01-25

## 2025-01-27 ENCOUNTER — MYC MEDICAL ADVICE (OUTPATIENT)
Dept: INTERNAL MEDICINE | Facility: CLINIC | Age: 75
End: 2025-01-27
Payer: COMMERCIAL

## 2025-02-10 ENCOUNTER — TELEPHONE (OUTPATIENT)
Dept: UROLOGY | Facility: CLINIC | Age: 75
End: 2025-02-10
Payer: COMMERCIAL

## 2025-02-10 DIAGNOSIS — N32.81 OAB (OVERACTIVE BLADDER): ICD-10-CM

## 2025-02-12 DIAGNOSIS — N32.81 OAB (OVERACTIVE BLADDER): ICD-10-CM

## 2025-02-12 RX ORDER — DARIFENACIN 7.5 MG/1
7.5 TABLET, EXTENDED RELEASE ORAL DAILY
Qty: 90 TABLET | Refills: 0 | Status: SHIPPED | OUTPATIENT
Start: 2025-02-12 | End: 2025-05-13

## 2025-02-12 NOTE — MR AVS SNAPSHOT
February 12, 2025       Lexx Sahu MD  [address] [facility] [address]  Via In Basket      Patient: Justine Stuart   YOB: 1954   Date of Visit: 2/12/2025       Dear Dr. Sahu:    I saw your patient, Justine Stuart, for an evaluation. Below are my notes for this visit with her.    If you have questions, please do not hesitate to call me.      Sincerely,        Priti Pemberton DO        CC: No Recipients  Priti Pemberton DO  2/12/2025 10:34 AM  Signed  Justine Stuart  02/12/25  [id number]    CC: T1DM, history of LVAD, osteoporosis  Chief Complaint   Patient presents with   • Office Visit   • Diabetes Mellitus     T1DM  Fbg 82  Smbg 4x's a day  Eye exam due   Foot exam completed 10/2024         Referring Provider: Lexx Sahu MD    PCP: Lexx Sahu MD    HPI/To Review  70 year old F here for follow up has LVAD 06/2017    Interval history: hospitalized for DKA in 10/2024 with GIB, patient does not remember.     Patient states transplant team declined approval for transplant due to chronic co-morbids      Diabetes History:  Diagnosed at age 29, when patient had symptoms of blurred vision, constant urination, yeast infection, polydipsia, states she has Type 1     Previous DM medications:  -actos      Current DM medications:  -basaglar/lantus 9 units at night   -humalog/novolog 5-6 units TID with meals self directed scale (lowered on how her own due to low BS), injecting in arms and top of leg      Wore medtronic ipro in 03/2019,  where she wore the sensor for a week so we can monitor the trends and her A1c was actually close to 8.7%    Diet:  B grits banana ham with craven grapes  L may eat out or half a sandwich   D salad  HS snack tea with sweetener cereal bar   Weight:     HbA1c: 5.4% 09/2024 (had prbc transfusion), 6.8% 04/2024, 6.9% 07/2023, 5.7% 03/2023 (but had prbc transfusion), 7.9% 07/2022, 7.8% 03/2022, 7.0%11/2021, 7.2% 04/2021, 7.5% 08/2020, 6.9% 09/2019, 6.6% 03/2019, 6.7%  "              After Visit Summary   10/3/2017    Angeli Jacobson    MRN: [id number]           Patient Information     Date Of Birth          1950        Visit Information        Provider Department      10/3/2017 9:15 AM MEREDITH MADRID MA/[facility]        Today's Diagnoses     Need for prophylactic vaccination and inoculation against influenza    -  1       Follow-ups after your visit        Your next 10 appointments already scheduled     Oct 18, 2017  2:00 PM CDT   DX HIP/PELVIS/SPINE [facility]    [address]  [address]  [facility] [address]              Please do not take any of the following 24 hours prior to the day of your exam: vitamins, calcium tablets, antacids.  If possible, please wear clothes without metal (snaps, zippers). A sweatsuit works well.            Oct 18, 2017  2:45 PM CDT   MA SCREENING DIGITAL BILATERAL with [id number]   [facility]    [address] [facility], [address]  [facility] [address] [phone]           Do not use any powder, lotion or deodorant under your arms or on your breast. If you do, we will ask you to remove it before your exam.  Wear comfortable, two-piece clothing.  If you have any allergies, tell your care team.  Bring any previous mammograms from other facilities or have them mailed to the breast center. Three-dimensional (3D) mammograms are available at [place] locations in [place], [facility], [facility], [place], [place], and [place]. -Health locations include [place] and [facility] in [place]. Benefits of 3D mammograms include: - Improved rate of cancer detection - Decreases your chance of having to go back for more tests, which means fewer: - \"False-positive\" results (This means that there is an abnormal area but it isn't cancer.) - Invasive testing procedures, such as a " 02/2019, 7.9% 03/2018    SMBG 4x/day relion brand  No longer with dexcom due to insurance change   Last use several months ago   14 day 227  30 day 201  Glucometer brought in  Dates and times are incorrect   82 131 296 416 419 378 57 40 54  Reports 40-50s are usually when waking up   FBS 82  Daytime readings   Variable     Polyuria denies   Polydipsia denies   Blurry vision denies   Paresthesias denies      Hypoglycemia awareness: yes      DM microvascular complications:   -Neuropathy: no  -Retinopathy: YES, saw in 2025, +DR, s/p LASER family eye physician   -Nephropathy: no, last MALB/Cr  Microalbumin/ Creatinine Ratio (mg/g)   Date Value   07/13/2023 511.0 (H)       DM macrovascular complications:  -HTN: yes  -HLD: yes on statin per VAD team  -CAD: CAD s/p MI and cardiogenic shock  -CVA/TIA: no  -PAD/PVD: no     Hospitalizations related to DM: not in the last one year, DKA 2009     History of thyroid disease: yes with hypothyroidism on LT4 88mcg daily,   Taking medication correctly. No hypothyroid symptoms. Taking biotin     TSH (MIU)   Date Value   07/05/2024 0.915       Bone Health: taking MVI, no vitamin D no history of fractures.    DXA in 03/2019 shows OP;   in 09/2021 with OP, increase in BMD in L spine and decrease in R hip and increase in BMD in L hip   -no history of fractures   -has been on coumadin since 2017   -Taking D3 2000 IU daily, vit D stable   -about 2 servings of dairy daily  -no history of kidney stones   -mother had OP  -menopause since age 45  S/p reclast 09/2019, 09/2020, 11/22/2021, 01/2023  -GFR in the 20-30s     Going to [facility] for Lichen planus     Abnormal cortisol  Was collected when on megace   Failed ACTH stim 04/2024      PMH/PSH  LVAD  CAD s/p cardiac arrest in 2017  HTN  HLD   Afib   hemoptysis in 10/2018  Colonoscopy s/p tubular adenoma   driveline infection  osteoporosis     FH  extensive cardiac disease  No DM     Social Hx  negative for illicits  negative for smoking  negative  biopsy or surgery - Can provide clearer images of the breast if you have dense breast tissue. 3D mammography is an optional exam that anyone can have with a 2D mammogram. It doesn't replace or take the place of a 2D mammogram. 2D mammograms remain an effective screening test for all women.  Not all insurance companies cover the cost of a 3D mammogram. Check with your insurance.              Who to contact     If you have questions or need follow up information about today's clinic visit or your schedule please contact [facility] directly at [phone].  Normal or non-critical lab and imaging results will be communicated to you by Cytosorbentshart, letter or phone within 4 business days after the clinic has received the results. If you do not hear from us within 7 days, please contact the clinic through [employer] or phone. If you have a critical or abnormal lab result, we will notify you by phone as soon as possible.  Submit refill requests through [employer] or call your pharmacy and they will forward the refill request to us. Please allow 3 business days for your refill to be completed.          Additional Information About Your Visit        [employer] Information     [employer] gives you secure access to your electronic health record. If you see a primary care provider, you can also send messages to your care team and make appointments. If you have questions, please call your primary care clinic.  If you do not have a primary care provider, please call [phone] and they will assist you.        Care EveryWhere ID     This is your Care EveryWhere ID. This could be used by other organizations to access your [facility] medical records  [id number]        Your Vitals Were     Last Period                   03/18/2005            Blood Pressure from Last 3 Encounters:   09/27/17 118/82   08/29/17 118/76   08/24/17 112/74    Weight from Last 3 Encounters:   08/29/17 144 lb (65.3 kg)   08/24/17 144 lb 12.8 oz (65.7 kg)    06/27/17 146 lb (66.2 kg)              We Performed the Following     ADMIN INFLUENZA (For MEDICARE Patients ONLY) []     FLU VACCINE, INCREASED ANTIGEN, PRESV FREE, AGE 65+ [19919]        Primary Care Provider Office Phone # Fax #    Sepideh Gloria Burris PA-C [phone] [address] [address] [facility] [address]        Equal Access to Services     KERON OCASIO : Hadii aad ku hadasho Soomaali, waaxda luqadaha, qaybta kaalmada adeegyada, waxay idiin haybettyn adeeg shaqcolt lacharmaine . So [facility] [phone].    ATENCIÓN: Si stanley anderson, tiene a carver disposición servicios gratuitos de asistencia lingüística. Llame al [phone].    We comply with applicable federal civil rights laws and [place] laws. We do not discriminate on the basis of race, color, national origin, age, disability, sex, sexual orientation, or gender identity.            Thank you!     Thank you for choosing [facility]  for your care. Our goal is always to provide you with excellent care. Hearing back from our patients is one way we can continue to improve our services. Please take a few minutes to complete the written survey that you may receive in the mail after your visit with us. Thank you!             Your Updated Medication List - Protect others around you: Learn how to safely use, store and throw away your medicines at www.disposemymeds.org.          This list is accurate as of: 10/3/17  9:29 AM.  Always use your most recent med list.                   Brand Name Dispense Instructions for use Diagnosis    aspirin 81 MG tablet      Take 1 tablet by mouth daily.    Mixed hyperlipidemia       atorvastatin 80 MG tablet    LIPITOR    90 tablet    Take 1 tablet (80 mg) by mouth daily    Hyperlipidemia LDL goal <160       buPROPion 300 MG 24 hr tablet    WELLBUTRIN XL    90 tablet    Take 1 tablet (300 mg) by mouth every morning    Major depression in complete remission (H)       estradiol 0.1 MG/GM cream  for etoh       ALLERGIES:   Allergen Reactions   • Seasonal Runny Nose     Unknown   • Codeine Other (See Comments)     She's not feeling great, can't remember the symptoms.   • Spironolactone Other (See Comments)     Can't remember         ROS  A 12 Point ROS was negative except that listed in the HPI    Meds  Current Outpatient Medications   Medication Sig   • amLODIPine (NORVASC) 10 MG tablet Take 10 mg by mouth daily. For high blood pressure   • furosemide (LASIX) 20 MG tablet Take 1 tablet by mouth 2 days a week. Take one tab every Tuesday and Friday   • ferrous sulfate 325 (65 FE) MG tablet Take 1 tablet by mouth in the morning and 1 tablet in the evening. Indications: Anemia From Inadequate Iron in the Body.   • potassium CHLORIDE (KLOR-CON M) 20 MEQ leon ER tablet Take 1 tablet by mouth daily.   • potassium CHLORIDE (KLOR-CON M) 20 MEQ leon ER tablet Take 1 tablet by mouth 2 days a week. Take 40 meq (two tabs potassium) twice a week when you take your lasix.   • warfarin (COUMADIN) 1 MG tablet (warfarin) Take 2 mg (2 tablets) by mouth daily in evening-OR- as directed by [facility] VAD team. INR goal 1.5-1.8.   • Insulin Aspart FlexPen (NovoLOG FLEXPEN) 100 UNIT/ML pen-injector Prime 2 units before each dose. Inject 5 units with breakfast, 6 units with lunch and 5 units with dinner + sliding scale 3 times daily. Maximum daily dose: 25 units   • cefTAZidime (FORTAZ) 2 g injection [None received]   • magnesium oxide (MAG-OX) 400 (240 Mg) MG tablet Take 2 tablets by mouth in the morning and 2 tablets at noon and 2 tablets in the evening. Indications: Disorder with Low Magnesium Levels.   • pantoprazole (PROTONIX) 40 MG tablet Take 1 tablet by mouth daily. Indications: Stomach Ulcer   • Continuous Glucose Transmitter (Dexcom G6 Transmitter) Misc Change transmitter every 90 days   • Continuous Glucose Sensor (Dexcom G6 Sensor) Misc Change sensor every 10 days   • isosorbide dinitrate (ISORDIL) 20 MG tablet Take 2     ESTRACE VAGINAL    0.5 g    Place 2 g vaginally three times a week    Atrophic vaginitis       FISH OIL PO      Take  by mouth.        fluticasone 50 MCG/ACT spray    FLONASE    1 Bottle    Spray 1-2 sprays into both nostrils daily    Hoarseness       Multi-vitamin Tabs tablet   Generic drug:  multivitamin, therapeutic with minerals     30    1 TABLET DAILY    Routine general medical examination at a health care facility       * order for DME     2 Device    Equipment being ordered: lymphedema bandages    Personal history of malignant neoplasm of other site, Other lymphedema       * order for DME     1 each    Equipment being ordered: Left Thigh High Compression Stocking, 550 CCL.3    Myxoid liposarcoma (H)       * Notice:  This list has 2 medication(s) that are the same as other medications prescribed for you. Read the directions carefully, and ask your doctor or other care provider to review them with you.       tablets by mouth in the morning and 2 tablets at noon and 2 tablets in the evening. Take with meals.   • acetaminophen (TYLENOL) 500 MG tablet Take 1 tablet by mouth 2 times daily as needed for Pain. Indications: mild pain   • insulin glargine (Basaglar KwikPen) 100 UNIT/ML pen-injector INJECT 9 UNITS INTO THE SKIN EVERY NIGHT AT BEDTIME. Prime 2 units before each dose.   • Insulin Pen Needle 31G X 4 MM Misc Use to inject insulin 4 times daily. Remove needle cover(s) to expose needle before injecting. Dx: E10.65 T1DM   • levothyroxine 88 MCG tablet Take 1 tablet by mouth daily. Indications: Underactive Thyroid   • docusate sodium-sennosides (SENOKOT S) 50-8.6 MG per tablet Take 2 tablets by mouth at bedtime as needed for Constipation.   • Cholecalciferol (Vitamin D3) 25 MCG Tab Take 1 tablet by mouth daily. Indications: Vitamin D Deficiency   • polyethylene glycol (MIRALAX) 17 g packet Take 17 g by mouth daily as needed (constipation). Indications: Constipation Stir and dissolve powder in any 4 to 8 ounces of beverage, then drink.   • hydrALAZINE (APRESOLINE) 100 MG tablet Take 1 tablet by mouth in the morning and 1 tablet at noon and 1 tablet in the evening. Indications: Cardiac Failure.   • pravastatin (PRAVACHOL) 40 MG tablet Take 1 tablet by mouth daily. Indications: Disease involving Lipid Deposits in the Arteries, Procedure to Reestablish Blood Supply to the Heart   • albuterol 108 (90 Base) MCG/ACT inhaler Inhale 2 puffs into the lungs every 4 hours as needed for Shortness of Breath or Wheezing.   • BIOTIN PO Take 0.5 Pieces by mouth daily. Indications: supplementation   • Multiple Vitamin (MULTI VITAMIN) Tab Take 1 tablet by mouth daily. Indications: Vitamin Deficiency     No current facility-administered medications for this visit.       Exam  Visit Vitals  Ht 5' (1.524 m)   Wt 46.7 kg (103 lb)   BMI 20.12 kg/m²     Has LVAD  Vitals Reviewed  General NAD, NCAT, frail appearing with cane   HEENT EOMI  no  thyromegaly, no thyroid nodules palpable no exophthalmos   CV LVAD sounds   Resp CTA bilaterally, no wheezes rales rhonchi  Abd soft nontender nondistended no lipohypertrophy  Ext no edema   MSK mostly normal gait with cane   Skin no rashes, no ecchymoses  Neuro alert and oriented x 3   Psych cooperative noncombative    Labs/Diagnostics   -Reviewed in [facility]  The Diabetes Tests flowsheet was reviewed.  Sodium (MEQ/L)   Date Value   02/04/2025 137     Potassium (MEQ/L)   Date Value   02/04/2025 3.9     Chloride (MG%)   Date Value   02/04/2025 100     Glucose (MG%)   Date Value   02/04/2025 220 (H)     CALCIUM (MG/DL)   Date Value   02/04/2025 9.4     Carbon Dioxide (MEQ/L)   Date Value   02/04/2025 28     BUN (MG/DL)   Date Value   02/04/2025 21     Creatinine (MG/DL)   Date Value   02/04/2025 1.5     CHOLESTEROL (MG/DL)   Date Value   07/05/2024 196.00     HDL (mg/dL)   Date Value   01/31/2023 64     Cholesterol/ HDL Ratio (no units)   Date Value   01/31/2023 3.0     Triglycerides (mg/dL)   Date Value   01/31/2023 97     LDL (mg/dL)   Date Value   01/31/2023 110     Hemoglobin A1C (%)   Date Value   09/28/2024 5.4     Microalbumin/ Creatinine Ratio (mg/g)   Date Value   07/13/2023 511.0 (H)     WBC (THSN/CMM)   Date Value   02/04/2025 10.1     RBC (MILL/CUMM)   Date Value   02/04/2025 3.83 (L)     HCT (%)   Date Value   02/04/2025 34.8 (L)     HGB (GMS/DL)   Date Value   02/04/2025 11.0 (L)     PLT (THSN/CUM)   Date Value   02/04/2025 408.0         Assessment/Plan  T1DM, uncontrolled with microalbuminaria, retinopathy  -goal HbA1c is 7-8% without experiencing any lows   Hemoglobin A1C (%)   Date Value   09/28/2024 5.4     -wore iPro sensor for about a week and average A1c from that was around 8.7% in 03/2019, having a lot of postprandial hyperglycemia   -with death of  in 11/2022  -due to lack of dexcom, her BS are all over the place   -she needs to  new sensors I have ordered it. If not covered, will  need to appeal  -at risk for hypoglycemia  -had DKA in 10/2024   -lantus/basaglar 9-->7 units qhs due to night time lows per report   -due to postprandial hyperglycemia, humalog 5-6 units TID with meals + scale 1:50>200 (due to brittle nature)  -reviewed appropriate insulin med administration technique  -to do SMBG premeals and HS, goal BS is   -to call if BS is <70 or >300  -bring log and meter to each visit   -discussed diet modifications  -discussed treatment for hypoglycemia with glucose tabs and/or 4oz of juice and wait 15 min to recheck BS and to repeat until BS>80  -counseled patient on long term disease complications including but not limited to blindness, neuropathy, PVD/PAD, CAD and heart disease, MI/Death, renal insufficiency and/or renal failure  -relevant labs reviewed in Epic     DM HM:   -ophtho: up to date   -renal:  overdue  -HTN: controlled  -HLD: on pravastatin 40mg daily   -feet: stable     Hypothyroidism  TSH (MIU)   Date Value   07/05/2024 0.915     -on LT4 88 mcg daily  -last TSH in range, will need repeat       Osteoporosis  -last DXA 09/2021   -no fractures  -Vit D adequate, continue D3 2000IU daily  -states doing a 24 hr urine calcium would be cumbersome for her  -has been on long term coumadin use   -no plans for dental work   -s/p reclast 09/2019, 09/2020, 11/2021, 01/2023    -with GFR being low, not a candidate for further IV reclast   -can hold off on further dxa at this time     Abnormal cortisol  -with low cortisol in 04/2024  -failed ACTH stim test in 04/2024  -was on megace at that time?  -no longer on megace  -repeat AM cortisol 7.2  -will need to repeat at 8 am if possible        RTC in 6 months       Thank you, Lexx Murphy MD, for allowing me to participate in the care of this patient!

## 2025-03-09 NOTE — PROGRESS NOTES
07/03/19 1026   Quick Adds   Type of Visit OP PT Re-evaluation   General Information   Start of Care Date 06/26/19   Referring Physician Ned Joseph, COURTNEY    Orders Evaluate and Treat as Indicated   Order Date 06/14/19   Medical Diagnosis Vertigo R42, Dizziness R42   Pertinent history of current problem (include personal factors and/or comorbidities that impact the POC) Patient reports no vertigo since her treatment last visit.    System Outcome Measures   Outcome Measures BPPV   Dizziness Handicap Inventory (score out of 100) A decrease in score by 17.18 or greater indicates a clinically significant change in symptoms. 0   Infrared Goggle Exam or Frenzel Lense Exam   Vestibular Suppressant in Last 24 Hours? No   Exam completed with Infrared Goggles   Spontaneous Nystagmus Negative   Taisha-Hallpike (right) Negative   Taisha-Hallpike (Left) Negative   HSCC Supine Roll Test (Right) Negative   HSCC Supine Roll Test (Left) Negative   Clinical Impression   Risk & Benefits of therapy have been explained Yes   Patient, Family & other staff in agreement with plan of care Yes   Clinical Impression Comments Patient was reassessed to ensure complete resolution of BPPV due to suspected B involvement on initial exam. IR exam was negative for BPPV in all canals today so no further treatment is indicated. Patient is discharged with goals met.   Goal 1   Goal Description Patient will score 0/100 on DHI indicating resolution of dizziness and no limitation with mobility related to dizziness.   Target Date 07/24/19   Goal Identifier 1   Date Met 07/03/19   Goal 2   Goal Description Patient will have negative s/s of BPPV for safety with bed mobility.   Target Date 07/24/19   Goal Identifier 2   Date Met 07/03/19   Total Evaluation Time   PT Eval, Re-eval Minutes (90048) 20      good balance

## 2025-03-13 ENCOUNTER — OFFICE VISIT (OUTPATIENT)
Dept: INTERNAL MEDICINE | Facility: CLINIC | Age: 75
End: 2025-03-13
Payer: COMMERCIAL

## 2025-03-13 VITALS
OXYGEN SATURATION: 96 % | HEIGHT: 66 IN | DIASTOLIC BLOOD PRESSURE: 78 MMHG | TEMPERATURE: 98.3 F | WEIGHT: 164.1 LBS | HEART RATE: 85 BPM | SYSTOLIC BLOOD PRESSURE: 128 MMHG | BODY MASS INDEX: 26.37 KG/M2 | RESPIRATION RATE: 12 BRPM

## 2025-03-13 DIAGNOSIS — S99.929A INJURY OF GREAT TOENAIL: Primary | ICD-10-CM

## 2025-03-13 PROCEDURE — 3074F SYST BP LT 130 MM HG: CPT | Performed by: INTERNAL MEDICINE

## 2025-03-13 PROCEDURE — 3078F DIAST BP <80 MM HG: CPT | Performed by: INTERNAL MEDICINE

## 2025-03-13 PROCEDURE — 99213 OFFICE O/P EST LOW 20 MIN: CPT | Performed by: INTERNAL MEDICINE

## 2025-03-13 NOTE — NURSING NOTE
"/78   Pulse 85   Temp 98.3  F (36.8  C) (Oral)   Resp 12   Ht 1.676 m (5' 6\")   Wt 74.4 kg (164 lb 1.6 oz)   LMP  (LMP Unknown)   SpO2 96%   BMI 26.49 kg/m      "

## 2025-03-13 NOTE — PROGRESS NOTES
"    ASSESSMENT & PLAN:                                                      (S95.956K) Injury of great toenail  (primary encounter diagnosis)  Comment: I covered it with curlex   Plan: she will keep it covered so it doesn't get caught in the shoes. She will soak it in warm water and gently she can trim it to comfort.        Chief Complaint:                                                      R great toenail       SUBJECTIVE:                                                    History of present illness     R great toe  -- something dropped suddenly on the R great toe and toenail. Very painful, then the toenail became black and tender.  -- the toenail is     ROS:                                                      ROS: negative for fever, chills, cough, wheezes, chest pain, shortness of breath, vomiting, abdominal pain, leg swelling       OBJECTIVE:                                                    Physical Exam :    Blood pressure 128/78, pulse 85, temperature 98.3  F (36.8  C), temperature source Oral, resp. rate 12, height 1.676 m (5' 6\"), weight 74.4 kg (164 lb 1.6 oz), SpO2 96%, not currently breastfeeding.   NAD, appears comfortable  Extremities: no edema, the R great toenail with hematoma at the base. New nail is comming. The old nail is detached on the medial side. No signs of cellulitis.   Neurologic: A, Ox3, no focal signs appreciated    PMHx: reviewed  Past Medical History:   Diagnosis Date    * * * SBE PROPHYLAXIS * * * 1998    Amox 500mg, take 4 tabs one hour prior to procedure.Takes this because of lymphedema secondary from leg surgey    Antiplatelet or antithrombotic long-term use     Arrhythmia     Central serous retinopathy 2001    Resolved 9/2001    CHRONIC NECK PAIN 1995    Depressive disorder     Depressive disorder, not elsewhere classified 2001    Elevated coronary artery calcium score=7/1/21 08/03/2021    Elevated coronary artery calcium score=7/1/21 08/03/2021    History of " blood transfusion 04/2019 12/2020    during left hip pinning, during surgery for patella    Lateral epicondylitis     MIXED HYPERLIPIDEMIA, LDL GOAL <160 1998    LDL goal < 160    Motion sickness     MYXOID LIPOSARCOMA 2000    Left thigh, S/P excision, radiation  at 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    CHRONIC LEFT THIGH LYMPHEDEMA    Nontoxic multinodular goiter 2005    needs yearly US    ALEXANDER (obstructive sleep apnea) 7/3/2024    Osteoporosis, unspecified 2001    Other chronic pain     fx ribs left     Other lymphedema 2000    left thigh, gets regular PT for this    Overactive bladder     PAD (peripheral artery disease) 04/20/2018    PONV (postoperative nausea and vomiting)     SHINGLES 2001    Sprain of lumbosacral (joint) (ligament) 1995    right    Unspecified hearing loss 1998    chronic tinnitus    Unspecified tinnitus 1998      PSHx: reviewed  Past Surgical History:   Procedure Laterality Date    ARTHROPLASTY HIP Left 10/4/2019    Procedure: Removal of left femoral hardware with a conversion to left total hip arthroplasty using a Biomet Annalisa femoral stem with an OsseoTi acetabular shell and a dual mobility bearing surface;  Surgeon: Spencer Celeste MD;  Location: [facility] OR    BIOPSY  April 2000    BIOPSY BREAST SEED LOCALIZATION Right 12/14/2022    Procedure: right breast tag localized lumpectomy;  Surgeon: Shelly Carr MD;  Location:  OR    BIOPSY NODE SENTINEL Right 12/14/2022    Procedure: with right sentinel lymph node biopsy;  Surgeon: Shelly Carr MD;  Location:  OR    BYPASS GRAFT FEMOROPOPLITEAL Left 1/21/2019    Procedure: LEFT FEMORAL TO ABOVE KNEE POPLITEAL  BYPASS WITH POLYTETRAFLUOROETHYLENE GRAFT;  Surgeon: Shade Owens MD;  Location:  OR    COLONOSCOPY N/A 2/5/2016    Procedure: COMBINED COLONOSCOPY, SINGLE OR MULTIPLE BIOPSY/POLYPECTOMY BY BIOPSY;  Surgeon: Varun Stanley MD, MD;  Location:  GI    COLONOSCOPY N/A 12/10/2021    Procedure:  COLONOSCOPY, WITH POLYPECTOMIES  USING COLD  SNARE,   CLIP APPLIED X2;  Surgeon: Dayton Luna MD;  Location:  GI    CYSTOSCOPY      EXCISION MALIG LESION>1.25CM  5/2000    Myxoid Liposarcoma      EXPLORE GROIN Right 5/1/2018    Procedure: EXPLORE GROIN;  EMERGENCY RIGHT FEMORAL EXPLORATION WITH FEMORAL ARTERY REPAIR.    EBL: 50mL;  Surgeon: Shade Owens MD;  Location: [facility] OR    HC COLP CERVIX/UPPER VAGINA  07/1997    Negative    HC DILATION/CURETTAGE DIAG/THER NON OB  02/1997    Post menopausal bleeding on HRT, negative    HIP SURGERY Left 04/13/2019    IR ANGIOGRAM THROUGH CATHETER FOLLOW UP  12/20/2018    IR ANGIOGRAM THROUGH CATHETER FOLLOW UP  12/21/2018    IR LOWER EXTREMITY ANGIOGRAM LEFT  12/19/2018    OPEN REDUCTION INTERNAL FIXATION PATELLA Left 12/21/2020    Procedure: Left partial patella fracture excision with advancement of the quadriceps tendon;  Surgeon: Stephen Rhodes MD;  Location:  OR    OPEN REDUCTION INTERNAL FIXATION RODDING INTRAMEDULLARY TIBIA Left 12/21/2020    Procedure: Open reduction intramedullary nailing of left tibia fracture;  Surgeon: Stephen Rhodes MD;  Location:  OR    REMOVE HARDWARE KNEE Left 5/31/2022    Procedure: Left knee patella hardware removal;  Surgeon: Spencer Celeste MD;  Location:  OR    REPAIR TENDON QUADRICEPS Left 7/28/2021    Procedure: Left knee quadricepsplasty with intraoperative patellar fracture requiring open reduction and internal fixation of the patella;  Surgeon: Spencer Celeste MD;  Location: [facility] OR    REPAIR TENDON QUADRICEPS Left 5/31/2022    Procedure: Open left knee VY quadricepsplasty with hardware removal and allograft;  Surgeon: Spencer Celeste MD;  Location:  OR    VASCULAR SURGERY  04/30/2018    Left SFA stent in bypass graft    ZZC APPENDECTOMY      Appendectomy    ZZC NONSPECIFIC PROCEDURE  04/2000    Open Biopsy Left Thigh Liposarcoma    ZZHC COLONOSCOPY THRU STOMA, DIAGNOSTIC  2006     due 2010        Meds: reviewed  Current Outpatient Medications   Medication Sig Dispense Refill    calcium carbonate 600 mg-vitamin D 400 units (CALTRATE) 600-400 MG-UNIT per tablet Take 1 chew tab by mouth daily      clopidogrel (PLAVIX) 75 MG tablet Take 1 tablet (75 mg) by mouth daily 90 tablet 3    darifenacin ER (ENABLEX) 7.5 MG 24 hr tablet Take 1 tablet (7.5 mg) by mouth daily. 90 tablet 0    denosumab (PROLIA) 60 MG/ML SOSY injection       evolocumab (REPATHA) 140 MG/ML prefilled autoinjector Inject 1 mL (140 mg) subcutaneously every 14 days. 6 mL 3    ezetimibe (ZETIA) 10 MG tablet Take 1 tablet (10 mg) by mouth daily 90 tablet 3    levothyroxine (SYNTHROID/LEVOTHROID) 75 MCG tablet Take 1 tablet (75 mcg) by mouth daily 90 tablet 3    magnesium oxide 200 MG TABS       multivitamin, therapeutic (THERA-VIT) TABS tablet Take 1 tablet by mouth daily      nitroFURantoin macrocrystal-monohydrate (MACROBID) 100 MG capsule TAKE 1 CAPSULE AFTER INTERCOURSE 30 capsule 0    rivaroxaban ANTICOAGULANT (XARELTO) 2.5 MG TABS tablet Take 1 tablet (2.5 mg) by mouth 2 times daily. 180 tablet 3    rosuvastatin (CRESTOR) 20 MG tablet Take 1 tablet (20 mg) by mouth daily 90 tablet 3    Sharps Container MISC Use as per  instructions for safe needle disposal 1 each 1    letrozole (FEMARA) 2.5 MG tablet Take 1 tablet (2.5 mg) by mouth daily. (Patient not taking: Reported on 1/8/2025) 90 tablet 3       Soc Hx: reviewed  Fam Hx: reviewed      Chart documentation was completed, in part, with [employer] voice-recognition software. Even though reviewed, some grammatical, spelling, and word errors may remain.      Edel Way MD  Internal Medicine

## 2025-03-16 ENCOUNTER — MYC MEDICAL ADVICE (OUTPATIENT)
Dept: INTERNAL MEDICINE | Facility: CLINIC | Age: 75
End: 2025-03-16
Payer: COMMERCIAL

## 2025-03-16 DIAGNOSIS — N30.00 ACUTE CYSTITIS WITHOUT HEMATURIA: Primary | ICD-10-CM

## 2025-03-17 ENCOUNTER — E-VISIT (OUTPATIENT)
Dept: URGENT CARE | Facility: CLINIC | Age: 75
End: 2025-03-17
Payer: COMMERCIAL

## 2025-03-17 ENCOUNTER — LAB (OUTPATIENT)
Dept: LAB | Facility: CLINIC | Age: 75
End: 2025-03-17
Payer: COMMERCIAL

## 2025-03-17 DIAGNOSIS — R30.0 DYSURIA: ICD-10-CM

## 2025-03-17 DIAGNOSIS — R30.0 DYSURIA: Primary | ICD-10-CM

## 2025-03-17 PROCEDURE — 99207 PR NON-BILLABLE SERV PER CHARTING: CPT | Performed by: FAMILY MEDICINE

## 2025-03-17 RX ORDER — NITROFURANTOIN 25; 75 MG/1; MG/1
100 CAPSULE ORAL 2 TIMES DAILY
Qty: 10 CAPSULE | Refills: 0 | Status: CANCELLED | OUTPATIENT
Start: 2025-03-17

## 2025-03-17 NOTE — TELEPHONE ENCOUNTER
The E-visit is addressed by a different provider.    RN,   She has hx of freq UTIs.  If E-visit doesn't prescribe Abx, please sign the Rx

## 2025-03-17 NOTE — PATIENT INSTRUCTIONS
Dear Angeli Jacobson,     After reviewing your responses, I would like you to come in for a urine test to make sure we treat you correctly. This urine test is to evaluate you for a possible urinary tract infection, and should be scheduled for today or tomorrow. Schedule a Lab Only appointment here.     Lab appointments are not available at most locations on the weekends. If no Lab Only appointment is available, you should be seen in any of our convenient Walk-in or Urgent Care Centers, which can be found on our website here.     You will receive instructions with your results in [employer]uddies once they are available.     If your symptoms worsen, you develop pain in your back or stomach, develop fevers, or are not improving in 5 days, please contact your primary care provider for an appointment or visit a Walk-in or Urgent Care Center to be seen.     Thanks again for choosing us as your health care partner,     Sly Prather MD

## 2025-03-17 NOTE — TELEPHONE ENCOUNTER
Virtual urgent care provider ordered urine sample. Results pending.     Call to spouse. States patient isn't home currently. Patient wasn't able to leave a urine sample today. Patient took a cup home but still hadn't been able to urinate. Spouse states she took a Macrobid (has this on hand) he thinks 2 days ago but didn't take any more because she wasn't sure if she should.    Advised ideally patient would give the urine sample today before starting the antibiotic but Dr. Way did give the OK to send the antibiotic. Spouse states since patient has Macrobid at home she will try to give a urine sample today and then can start the prescription she has at home. Discussed refrigerating urine and dropping it off in the morning isn't preferred but could be an option. Discussed could also drop off urine at a [place] urgent care this evening before 8 pm. Spouse verbalized understanding and will discuss with patient.     Routing to primary care provider as RACHELLE.

## 2025-03-18 ENCOUNTER — LAB (OUTPATIENT)
Dept: LAB | Facility: CLINIC | Age: 75
End: 2025-03-18
Payer: COMMERCIAL

## 2025-03-18 DIAGNOSIS — E03.9 HYPOTHYROIDISM, UNSPECIFIED TYPE: ICD-10-CM

## 2025-03-18 DIAGNOSIS — E78.5 HYPERLIPIDEMIA LDL GOAL <70: ICD-10-CM

## 2025-03-18 DIAGNOSIS — I89.0 LYMPHEDEMA OF LEFT LEG: ICD-10-CM

## 2025-03-18 DIAGNOSIS — N30.00 ACUTE CYSTITIS WITHOUT HEMATURIA: Primary | ICD-10-CM

## 2025-03-18 LAB
ALBUMIN UR-MCNC: >=300 MG/DL
APPEARANCE UR: ABNORMAL
BACTERIA #/AREA URNS HPF: ABNORMAL /HPF
BASOPHILS # BLD AUTO: 0 10E3/UL (ref 0–0.2)
BASOPHILS NFR BLD AUTO: 0 %
BILIRUB UR QL STRIP: NEGATIVE
COLOR UR AUTO: YELLOW
EOSINOPHIL # BLD AUTO: 0.1 10E3/UL (ref 0–0.7)
EOSINOPHIL NFR BLD AUTO: 2 %
ERYTHROCYTE [DISTWIDTH] IN BLOOD BY AUTOMATED COUNT: 13.1 % (ref 10–15)
GLUCOSE UR STRIP-MCNC: NEGATIVE MG/DL
HCT VFR BLD AUTO: 42.9 % (ref 35–47)
HGB BLD-MCNC: 14 G/DL (ref 11.7–15.7)
HGB UR QL STRIP: ABNORMAL
IMM GRANULOCYTES # BLD: 0 10E3/UL
IMM GRANULOCYTES NFR BLD: 0 %
KETONES UR STRIP-MCNC: NEGATIVE MG/DL
LEUKOCYTE ESTERASE UR QL STRIP: ABNORMAL
LYMPHOCYTES # BLD AUTO: 1.6 10E3/UL (ref 0.8–5.3)
LYMPHOCYTES NFR BLD AUTO: 27 %
MCH RBC QN AUTO: 29.8 PG (ref 26.5–33)
MCHC RBC AUTO-ENTMCNC: 32.6 G/DL (ref 31.5–36.5)
MCV RBC AUTO: 91 FL (ref 78–100)
MONOCYTES # BLD AUTO: 0.5 10E3/UL (ref 0–1.3)
MONOCYTES NFR BLD AUTO: 8 %
NEUTROPHILS # BLD AUTO: 3.7 10E3/UL (ref 1.6–8.3)
NEUTROPHILS NFR BLD AUTO: 63 %
NITRATE UR QL: NEGATIVE
PH UR STRIP: 7.5 [PH] (ref 5–7)
PLATELET # BLD AUTO: 228 10E3/UL (ref 150–450)
RBC # BLD AUTO: 4.7 10E6/UL (ref 3.8–5.2)
RBC #/AREA URNS AUTO: ABNORMAL /HPF
SP GR UR STRIP: 1.02 (ref 1–1.03)
SQUAMOUS #/AREA URNS AUTO: ABNORMAL /LPF
UROBILINOGEN UR STRIP-ACNC: 0.2 E.U./DL
WBC # BLD AUTO: 5.9 10E3/UL (ref 4–11)
WBC #/AREA URNS AUTO: >100 /HPF
WBC CLUMPS #/AREA URNS HPF: PRESENT /HPF

## 2025-03-18 PROCEDURE — 85025 COMPLETE CBC W/AUTO DIFF WBC: CPT

## 2025-03-18 PROCEDURE — 87186 SC STD MICRODIL/AGAR DIL: CPT | Mod: 59

## 2025-03-18 PROCEDURE — 84443 ASSAY THYROID STIM HORMONE: CPT

## 2025-03-18 PROCEDURE — 87086 URINE CULTURE/COLONY COUNT: CPT

## 2025-03-18 PROCEDURE — 36415 COLL VENOUS BLD VENIPUNCTURE: CPT

## 2025-03-18 PROCEDURE — 80061 LIPID PANEL: CPT

## 2025-03-18 PROCEDURE — 81001 URINALYSIS AUTO W/SCOPE: CPT

## 2025-03-18 PROCEDURE — 87088 URINE BACTERIA CULTURE: CPT

## 2025-03-18 PROCEDURE — 80053 COMPREHEN METABOLIC PANEL: CPT

## 2025-03-18 RX ORDER — CEPHALEXIN 500 MG/1
500 CAPSULE ORAL 2 TIMES DAILY
Qty: 10 CAPSULE | Refills: 0 | Status: SHIPPED | OUTPATIENT
Start: 2025-03-18 | End: 2025-03-23

## 2025-03-19 LAB
ALBUMIN SERPL BCG-MCNC: 4.2 G/DL (ref 3.5–5.2)
ALP SERPL-CCNC: 78 U/L (ref 40–150)
ALT SERPL W P-5'-P-CCNC: 25 U/L (ref 0–50)
ANION GAP SERPL CALCULATED.3IONS-SCNC: 13 MMOL/L (ref 7–15)
AST SERPL W P-5'-P-CCNC: 27 U/L (ref 0–45)
BILIRUB SERPL-MCNC: 0.3 MG/DL
BUN SERPL-MCNC: 14 MG/DL (ref 8–23)
CALCIUM SERPL-MCNC: 10.5 MG/DL (ref 8.8–10.4)
CHLORIDE SERPL-SCNC: 106 MMOL/L (ref 98–107)
CHOLEST SERPL-MCNC: 108 MG/DL
CREAT SERPL-MCNC: 0.73 MG/DL (ref 0.51–0.95)
EGFRCR SERPLBLD CKD-EPI 2021: 86 ML/MIN/1.73M2
FASTING STATUS PATIENT QL REPORTED: YES
FASTING STATUS PATIENT QL REPORTED: YES
GLUCOSE SERPL-MCNC: 98 MG/DL (ref 70–99)
HCO3 SERPL-SCNC: 24 MMOL/L (ref 22–29)
HDLC SERPL-MCNC: 56 MG/DL
LDLC SERPL CALC-MCNC: 34 MG/DL
NONHDLC SERPL-MCNC: 52 MG/DL
POTASSIUM SERPL-SCNC: 4.4 MMOL/L (ref 3.4–5.3)
PROT SERPL-MCNC: 6.9 G/DL (ref 6.4–8.3)
SODIUM SERPL-SCNC: 143 MMOL/L (ref 135–145)
TRIGL SERPL-MCNC: 88 MG/DL
TSH SERPL DL<=0.005 MIU/L-ACNC: 1.15 UIU/ML (ref 0.3–4.2)

## 2025-03-20 LAB
BACTERIA UR CULT: ABNORMAL
BACTERIA UR CULT: ABNORMAL

## 2025-03-22 ENCOUNTER — HEALTH MAINTENANCE LETTER (OUTPATIENT)
Age: 75
End: 2025-03-22

## 2025-03-23 DIAGNOSIS — Z17.0 MALIGNANT NEOPLASM OF UPPER-OUTER QUADRANT OF RIGHT BREAST IN FEMALE, ESTROGEN RECEPTOR POSITIVE (H): ICD-10-CM

## 2025-03-23 DIAGNOSIS — C50.411 MALIGNANT NEOPLASM OF UPPER-OUTER QUADRANT OF RIGHT BREAST IN FEMALE, ESTROGEN RECEPTOR POSITIVE (H): ICD-10-CM

## 2025-03-23 DIAGNOSIS — M81.0 AGE-RELATED OSTEOPOROSIS WITHOUT CURRENT PATHOLOGICAL FRACTURE: Primary | ICD-10-CM

## 2025-03-23 RX ORDER — DIPHENHYDRAMINE HYDROCHLORIDE 50 MG/ML
50 INJECTION, SOLUTION INTRAMUSCULAR; INTRAVENOUS
Start: 2025-03-23

## 2025-03-23 RX ORDER — ALBUTEROL SULFATE 0.83 MG/ML
2.5 SOLUTION RESPIRATORY (INHALATION)
OUTPATIENT
Start: 2025-03-23

## 2025-03-23 RX ORDER — MEPERIDINE HYDROCHLORIDE 25 MG/ML
25 INJECTION INTRAMUSCULAR; INTRAVENOUS; SUBCUTANEOUS EVERY 30 MIN PRN
OUTPATIENT
Start: 2025-03-23

## 2025-03-23 RX ORDER — EPINEPHRINE 1 MG/ML
0.3 INJECTION, SOLUTION, CONCENTRATE INTRAVENOUS EVERY 5 MIN PRN
OUTPATIENT
Start: 2025-03-23

## 2025-03-23 RX ORDER — ALBUTEROL SULFATE 90 UG/1
1-2 INHALANT RESPIRATORY (INHALATION)
Start: 2025-03-23

## 2025-03-25 ENCOUNTER — ONCOLOGY VISIT (OUTPATIENT)
Dept: ONCOLOGY | Facility: CLINIC | Age: 75
End: 2025-03-25
Attending: INTERNAL MEDICINE
Payer: COMMERCIAL

## 2025-03-25 ENCOUNTER — ALLIED HEALTH/NURSE VISIT (OUTPATIENT)
Dept: INFUSION THERAPY | Facility: CLINIC | Age: 75
End: 2025-03-25
Attending: INTERNAL MEDICINE
Payer: COMMERCIAL

## 2025-03-25 VITALS
BODY MASS INDEX: 26.12 KG/M2 | OXYGEN SATURATION: 96 % | RESPIRATION RATE: 16 BRPM | TEMPERATURE: 97.3 F | HEART RATE: 64 BPM | WEIGHT: 161.8 LBS

## 2025-03-25 DIAGNOSIS — Z79.811 LONG TERM CURRENT USE OF AROMATASE INHIBITOR: ICD-10-CM

## 2025-03-25 DIAGNOSIS — C50.411 MALIGNANT NEOPLASM OF UPPER-OUTER QUADRANT OF RIGHT BREAST IN FEMALE, ESTROGEN RECEPTOR POSITIVE (H): Primary | ICD-10-CM

## 2025-03-25 DIAGNOSIS — Z17.0 MALIGNANT NEOPLASM OF UPPER-OUTER QUADRANT OF RIGHT BREAST IN FEMALE, ESTROGEN RECEPTOR POSITIVE (H): Primary | ICD-10-CM

## 2025-03-25 DIAGNOSIS — M80.00XD AGE-RELATED OSTEOPOROSIS WITH CURRENT PATHOLOGICAL FRACTURE WITH ROUTINE HEALING, SUBSEQUENT ENCOUNTER: ICD-10-CM

## 2025-03-25 DIAGNOSIS — I89.0 LYMPHEDEMA OF LEFT LEG: ICD-10-CM

## 2025-03-25 DIAGNOSIS — R53.81 PHYSICAL DECONDITIONING: ICD-10-CM

## 2025-03-25 DIAGNOSIS — M80.00XD AGE-RELATED OSTEOPOROSIS WITH CURRENT PATHOLOGICAL FRACTURE WITH ROUTINE HEALING, SUBSEQUENT ENCOUNTER: Primary | ICD-10-CM

## 2025-03-25 DIAGNOSIS — C50.411 MALIGNANT NEOPLASM OF UPPER-OUTER QUADRANT OF RIGHT BREAST IN FEMALE, ESTROGEN RECEPTOR POSITIVE (H): ICD-10-CM

## 2025-03-25 DIAGNOSIS — Z17.0 MALIGNANT NEOPLASM OF UPPER-OUTER QUADRANT OF RIGHT BREAST IN FEMALE, ESTROGEN RECEPTOR POSITIVE (H): ICD-10-CM

## 2025-03-25 LAB
ALBUMIN SERPL BCG-MCNC: 4.3 G/DL (ref 3.5–5.2)
CALCIUM SERPL-MCNC: 10.6 MG/DL (ref 8.8–10.4)
CREAT SERPL-MCNC: 0.98 MG/DL (ref 0.51–0.95)
EGFRCR SERPLBLD CKD-EPI 2021: 60 ML/MIN/1.73M2

## 2025-03-25 PROCEDURE — 82040 ASSAY OF SERUM ALBUMIN: CPT | Performed by: INTERNAL MEDICINE

## 2025-03-25 PROCEDURE — 36415 COLL VENOUS BLD VENIPUNCTURE: CPT | Performed by: INTERNAL MEDICINE

## 2025-03-25 PROCEDURE — 82565 ASSAY OF CREATININE: CPT | Performed by: INTERNAL MEDICINE

## 2025-03-25 PROCEDURE — 96372 THER/PROPH/DIAG INJ SC/IM: CPT | Performed by: INTERNAL MEDICINE

## 2025-03-25 PROCEDURE — 82310 ASSAY OF CALCIUM: CPT | Performed by: INTERNAL MEDICINE

## 2025-03-25 PROCEDURE — G0463 HOSPITAL OUTPT CLINIC VISIT: HCPCS | Performed by: STUDENT IN AN ORGANIZED HEALTH CARE EDUCATION/TRAINING PROGRAM

## 2025-03-25 PROCEDURE — 250N000011 HC RX IP 250 OP 636: Mod: JZ | Performed by: INTERNAL MEDICINE

## 2025-03-25 RX ORDER — METHYLPREDNISOLONE SODIUM SUCCINATE 125 MG/2ML
125 INJECTION INTRAMUSCULAR; INTRAVENOUS
Start: 2025-08-24

## 2025-03-25 RX ORDER — ALBUTEROL SULFATE 0.83 MG/ML
2.5 SOLUTION RESPIRATORY (INHALATION)
Status: CANCELLED | OUTPATIENT
Start: 2025-08-24

## 2025-03-25 RX ORDER — DIPHENHYDRAMINE HYDROCHLORIDE 50 MG/ML
50 INJECTION, SOLUTION INTRAMUSCULAR; INTRAVENOUS
Status: CANCELLED
Start: 2025-08-24

## 2025-03-25 RX ORDER — DIPHENHYDRAMINE HYDROCHLORIDE 50 MG/ML
50 INJECTION, SOLUTION INTRAMUSCULAR; INTRAVENOUS
Start: 2025-08-24

## 2025-03-25 RX ORDER — ALBUTEROL SULFATE 90 UG/1
1-2 INHALANT RESPIRATORY (INHALATION)
Start: 2025-08-24

## 2025-03-25 RX ORDER — ALBUTEROL SULFATE 0.83 MG/ML
2.5 SOLUTION RESPIRATORY (INHALATION)
OUTPATIENT
Start: 2025-08-24

## 2025-03-25 RX ORDER — MEPERIDINE HYDROCHLORIDE 25 MG/ML
25 INJECTION INTRAMUSCULAR; INTRAVENOUS; SUBCUTANEOUS EVERY 30 MIN PRN
Status: CANCELLED | OUTPATIENT
Start: 2025-08-24

## 2025-03-25 RX ORDER — MEPERIDINE HYDROCHLORIDE 25 MG/ML
25 INJECTION INTRAMUSCULAR; INTRAVENOUS; SUBCUTANEOUS EVERY 30 MIN PRN
OUTPATIENT
Start: 2025-08-24

## 2025-03-25 RX ORDER — ALBUTEROL SULFATE 90 UG/1
1-2 INHALANT RESPIRATORY (INHALATION)
Status: CANCELLED
Start: 2025-08-24

## 2025-03-25 RX ORDER — METHYLPREDNISOLONE SODIUM SUCCINATE 125 MG/2ML
125 INJECTION INTRAMUSCULAR; INTRAVENOUS
Status: CANCELLED
Start: 2025-08-24

## 2025-03-25 RX ORDER — EPINEPHRINE 1 MG/ML
0.3 INJECTION, SOLUTION INTRAMUSCULAR; SUBCUTANEOUS EVERY 5 MIN PRN
OUTPATIENT
Start: 2025-08-24

## 2025-03-25 RX ORDER — EPINEPHRINE 1 MG/ML
0.3 INJECTION, SOLUTION INTRAMUSCULAR; SUBCUTANEOUS EVERY 5 MIN PRN
Status: CANCELLED | OUTPATIENT
Start: 2025-08-24

## 2025-03-25 RX ADMIN — DENOSUMAB 60 MG: 60 INJECTION SUBCUTANEOUS at 11:51

## 2025-03-25 ASSESSMENT — PAIN SCALES - GENERAL: PAINLEVEL_OUTOF10: NO PAIN (0)

## 2025-03-25 NOTE — PATIENT INSTRUCTIONS
It was nice to see you again today.    1.  A lymphedema therapy referral was placed.  2.  An order for a new Flexitouch lymphedema pump was placed.  3.  Continue your daily regimens including compression and wrapping.  4.  Follow-up with Dr. Sharma in 9 months to 1 year.

## 2025-03-25 NOTE — PROGRESS NOTES
[facility]   PM&R clinic note        Interval history:     Angeli Jacobson presents to clinic today for follow up reg her rehab needs.   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   Was last seen in clinic on 3/12/24.  Recommendations included:  1.Therapy/equipment/braces:  New referral to lymphedema therapy for maintenance sessions  Referral for new LLE compression garment  2. Referral / follow up with other providers:                 1.  Follow-up as planned with PCP and oncology team  Follow up: 1 year or as needed    Oncologic history:  1.  11/7/2022: Screening mammogram showed asymmetry in the right breast at 12:00, retroareolar far posterior.  Left breast negative.  2.  11/15/2022: Diagnostic right mammogram showed asymmetry in the posterior right breast, 8 cm from nipple.  Ultrasound of right breast at 12:00, 2 cm from nipple showed a small benign cyst but no definite sonographic correlate for the mammographic asymmetry.  3.  11/16/2022: Stereotactic guided right breast needle biopsy of 8 mm lesion at 3:00, 8 cm from the nipple showed grade 2 invasive ductal carcinoma, ER positive at %, NH positive at 60-70%, HER2 IHC = 2+ equivocal, HER2/compa FISH negative.  4. 12/14/2022: Underwent right breast lumpectomy with right axillary sentinel lymph node excision with Dr. Shelly Carr.  Pathology showed no residual invasive malignancy; 2 microscopic foci of DCIS, grade 2, largest measuring 1.2 mm; margins negative; total 2 right axillary lymph nodes negative.  5. 2/10/23- Follow up visit with Dr. Angulo. Recommend total of 5 years of hormone blockade  therapy. Agreed to try anastrozole. Continue lymphedema therapy and compression stockings for LLE lymphedema.  6. 6/19/23- Saw Dr. Angulo for follow up. Reported physical tiredness and joint stiffness since starting anastrozole.  noticed mental slowness. Tried protein powder which has helped a bit with energy level. Has 3 week cruise coming up at end of 7/2023. Due to increased mental/physical fatigue, suggested discontinue anastrozole and start letrozole after she returns from her trip. Agreed to start letrozole in 8/23. Due for next mammogramp in 11/23.  Saw Dr. Angulo on 1/8/25. Restarted letrozole. Dur for annual bilateral mammogram in 11/25.       Symptoms,  Angeli is seen for a return visit. She is worried about fibrosis in her left leg developing. She now has a nighttime sleeve and received this 6 months ago. She also has a daytime compression sleeve and wraps three times per week. She does not believe the swelling is worse.   She has gained some weight, unintentional. She continues on letrozole.   She did see Dr. Mckee for her knee, and did not see excessive fluid which was reassuring.   Stamina is less, and she is walking a third of what she used to. This is not new and has been present over the last year. She is doing ok in terms of her compression garments. She last saw Mariana Guzman in the fall. She is using a flexitouch pump three times weekly. It is very old and needs refurbishing per Angeli and her .       Therapies/HEP,  Last did lymphedema therapy in fall of 2024.       Functionally,         Social history is unchanged.        Medications:  Current Outpatient Medications   Medication Sig Dispense Refill    calcium carbonate 600 mg-vitamin D 400 units (CALTRATE) 600-400 MG-UNIT per tablet Take 1 chew tab by mouth daily      clopidogrel (PLAVIX) 75 MG tablet Take 1 tablet (75 mg) by mouth daily 90 tablet 3    darifenacin ER (ENABLEX) 7.5 MG 24 hr tablet Take 1 tablet (7.5 mg) by  mouth daily. 90 tablet 0    denosumab (PROLIA) 60 MG/ML SOSY injection       evolocumab (REPATHA) 140 MG/ML prefilled autoinjector Inject 1 mL (140 mg) subcutaneously every 14 days. 6 mL 3    ezetimibe (ZETIA) 10 MG tablet Take 1 tablet (10 mg) by mouth daily 90 tablet 3    letrozole (FEMARA) 2.5 MG tablet Take 1 tablet (2.5 mg) by mouth daily. 90 tablet 3    levothyroxine (SYNTHROID/LEVOTHROID) 75 MCG tablet Take 1 tablet (75 mcg) by mouth daily 90 tablet 3    magnesium oxide 200 MG TABS       multivitamin, therapeutic (THERA-VIT) TABS tablet Take 1 tablet by mouth daily      nitroFURantoin macrocrystal-monohydrate (MACROBID) 100 MG capsule TAKE 1 CAPSULE AFTER INTERCOURSE 30 capsule 0    rivaroxaban ANTICOAGULANT (XARELTO) 2.5 MG TABS tablet Take 1 tablet (2.5 mg) by mouth 2 times daily. 180 tablet 3    rosuvastatin (CRESTOR) 20 MG tablet Take 1 tablet (20 mg) by mouth daily 90 tablet 3    Sharps Container MISC Use as per  instructions for safe needle disposal 1 each 1    sulfamethoxazole-trimethoprim (BACTRIM DS) 800-160 MG tablet Take 1 tablet by mouth 2 times daily for 7 days. 14 tablet 0              Physical Exam:   Pulse 64   Temp 97.3  F (36.3  C) (Oral)   Resp 16   Wt 73.4 kg (161 lb 12.8 oz)   LMP  (LMP Unknown)   SpO2 96%   BMI 26.12 kg/m    Gen: NAD, pleasant and cooperative   HEENT: MMM  Pulm: non-labored breathing in room air  Abd: benign, superficial and deep palpation does not reveal any abnormalities.  No fluid shift noted.  Ext: Mild lymphedema in LLE extending from thigh to foot, stable from previous visit.  Compression garment on left lower extremity, nontender calves bilaterally, nontender knees to palpation  Neuro/MSK:   -Strength 5/5 in right hip flexors, right knee flexors, right knee extensors, bilateral ankle dorsiflexion and ankle plantarflexion  -Strength 3/5 in left hip flexors, 4/5 in left knee extensors and knee flexors  -Sensation intact to soft touch of bilateral  lower extremities    Labs/Imaging:  Lab Results   Component Value Date    WBC 5.9 03/18/2025    HGB 14.0 03/18/2025    HCT 42.9 03/18/2025    MCV 91 03/18/2025     03/18/2025     Lab Results   Component Value Date     03/18/2025    POTASSIUM 4.4 03/18/2025    CHLORIDE 106 03/18/2025    CO2 24 03/18/2025    GLC 98 03/18/2025     Lab Results   Component Value Date    GFRESTIMATED 86 03/18/2025    GFRESTBLACK >90 05/17/2021     Lab Results   Component Value Date    AST 27 03/18/2025    ALT 25 03/18/2025    ALKPHOS 78 03/18/2025    BILITOTAL 0.3 03/18/2025    BILICONJ 0.0 06/06/2006     Lab Results   Component Value Date    INR 1.01 09/27/2019     Lab Results   Component Value Date    BUN 14.0 03/18/2025    CR 0.73 03/18/2025              Assessment/Plan   Angeli Jacobson presents to clinic today for follow up reg her rehab needs.   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 Was last seen in clinic on 3/12/24. Please see below recommendations from today's visit.    1.Therapy/equipment/braces:  New referral to lymphedema therapy for skin fibrosis and swelling control.   Continue with LLE compression garments (day and night) and wrapping.  Continue flexitouch pump. Pump is greater than 15 years old and in need of replacement.   Angeli Jacobson is a patient who I have been treating for swelling due to lymphedema as a result of previous surgical/radiation intervention.  They have had aggressive treatment including education, elevation, medications, exercise, therapy, massage, bandaging and compression garments. Angeli Jacobson has been compliant with the program for swelling, but  despite this continues to have problems with continued swelling, pain, scarring, fibrosis, epithelial hyperplasia and decreased functional mobility.  Swelling and fullness with fibrosis and scarring extends to the lower trunk.  It is my medical opinion a new replacement advanced lymphatic pump is required, as her current flexitouch pump is over 15 years old.  I am requesting a new Flexitouch lymphatic pump.   Follow up: 9 months- 1 year or as needed      Leeann Sharma MD  Physical Medicine & Rehabilitation      50 minutes spent on the date of the encounter doing chart review, history and exam, documentation and further activities as noted above.

## 2025-03-25 NOTE — PROGRESS NOTES
Medical Assistant Note:  Angeli ALBA Kavon presents today for blood draw.    Patient seen by provider today: Yes: tiffani.   present during visit today: Not Applicable.    Concerns: No Concerns.    Procedure:  Lab draw site: lac, Needle type: bf, Gauge: 23.    Post Assessment:  Labs drawn without difficulty: Yes.    Discharge Plan:  Departure Mode: Ambulatory.    Face to Face Time: 5 min.    Ana Winchester, CMA

## 2025-03-25 NOTE — PROGRESS NOTES
"Oncology Rooming Note    March 25, 2025 10:51 AM   Angeli PARTH Jacobson is a 74 year old female who presents for:    Chief Complaint   Patient presents with    Oncology Clinic Visit     Initial Vitals: Pulse 64   Temp 97.3  F (36.3  C) (Oral)   Resp 16   Wt 73.4 kg (161 lb 12.8 oz)   LMP  (LMP Unknown)   SpO2 96%   BMI 26.12 kg/m   Estimated body mass index is 26.12 kg/m  as calculated from the following:    Height as of 3/13/25: 1.676 m (5' 6\").    Weight as of this encounter: 73.4 kg (161 lb 12.8 oz). Body surface area is 1.85 meters squared.  No Pain (0) Comment: Data Unavailable   No LMP recorded (lmp unknown). Patient is postmenopausal.  Allergies reviewed: Yes  Medications reviewed: Yes    Medications: Medication refills not needed today.  Pharmacy name entered into [employer]:    [facility] [address] [place], [place] - [address] [id number] [place], [place] - [address] [facility] [id number] [facility] [address] [facility]    Frailty Screening:   Is the patient here for a new oncology consult visit in cancer care? 2. No    PHQ9:  Did this patient require a PHQ9?: No      Clinical concerns: Pt wondering if there is fibrosis in left leg?  Dr. Sharma  was notified.      Shari J. Schoenberger, [facility]            "

## 2025-03-25 NOTE — LETTER
"3/25/2025      Angeli Jacobson  [id number]  [facility] [address]      Dear Colleague,    Thank you for referring your patient, Angeli Jacobson, to the [facility]. Please see a copy of my visit note below.    Oncology Rooming Note    March 25, 2025 10:51 AM   Angeli Jacobson is a 74 year old female who presents for:    Chief Complaint   Patient presents with     Oncology Clinic Visit     Initial Vitals: Pulse 64   Temp 97.3  F (36.3  C) (Oral)   Resp 16   Wt 73.4 kg (161 lb 12.8 oz)   LMP  (LMP Unknown)   SpO2 96%   BMI 26.12 kg/m   Estimated body mass index is 26.12 kg/m  as calculated from the following:    Height as of 3/13/25: 1.676 m (5' 6\").    Weight as of this encounter: 73.4 kg (161 lb 12.8 oz). Body surface area is 1.85 meters squared.  No Pain (0) Comment: Data Unavailable   No LMP recorded (lmp unknown). Patient is postmenopausal.  Allergies reviewed: Yes  Medications reviewed: Yes    Medications: Medication refills not needed today.  Pharmacy name entered into Med[employer]:    [id number] [place], [place] [facility] [address] [id number] [place], [place] - [address] [facility]  [id number] [address] [facility]    Frailty Screening:   Is the patient here for a new oncology consult visit in cancer care? 2. No    PHQ9:  Did this patient require a PHQ9?: No      Clinical concerns: Pt wondering if there is fibrosis in left leg?  Dr. Sharma  was notified.      Shari J. Schoenberger, EMELY              [facility]   PM&R clinic note        Interval history:     Angeli Jacobson presents to clinic today for follow up reg her rehab needs.   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   Was last seen in clinic on 3/12/24.  Recommendations included:  1.Therapy/equipment/braces:  New referral to lymphedema therapy for maintenance sessions  Referral for new LLE compression garment  2. Referral / follow up with other providers:                 1.  Follow-up as planned with PCP and oncology team  Follow up: 1 year or as needed    Oncologic history:  1.  11/7/2022: Screening mammogram showed asymmetry in the right breast at 12:00, retroareolar far posterior.  Left breast negative.  2.  11/15/2022: Diagnostic right mammogram showed asymmetry in the posterior right breast, 8 cm from nipple.  Ultrasound of right breast at 12:00, 2 cm from nipple showed a small benign cyst but no definite sonographic correlate for the mammographic asymmetry.  3.  11/16/2022: Stereotactic guided right breast needle biopsy of 8 mm lesion at 3:00, 8 cm from the nipple showed grade 2 invasive ductal carcinoma, ER positive at %, NJ positive at 60-70%, HER2 IHC = 2+ equivocal, HER2/compa FISH negative.  4. 12/14/2022: Underwent right breast lumpectomy with right axillary sentinel lymph node excision with Dr. Shelly Carr.  Pathology showed no residual invasive malignancy; 2 microscopic foci of DCIS, grade 2, largest measuring 1.2 mm; margins negative; total 2 right axillary lymph nodes negative.  5. 2/10/23- Follow up visit with Dr. Angulo. Recommend total of 5 years of hormone blockade therapy. Agreed to try anastrozole. Continue lymphedema therapy and compression stockings for LLE lymphedema.  6. 6/19/23- Saw Dr. Angulo for follow up. Reported physical tiredness and joint stiffness since starting anastrozole.  noticed mental slowness. Tried protein powder which has helped a bit with energy level. Has 3 week cruise coming up at end of 7/2023. Due to increased mental/physical fatigue, suggested  discontinue anastrozole and start letrozole after she returns from her trip. Agreed to start letrozole in 8/23. Due for next mammogramp in 11/23.  Saw Dr. Angulo on 1/8/25. Restarted letrozole. Dur for annual bilateral mammogram in 11/25.       Symptoms,  Angeli is seen for a return visit. She is worried about fibrosis in her left leg developing. She now has a nighttime sleeve and received this 6 months ago. She also has a daytime compression sleeve and wraps three times per week. She does not believe the swelling is worse.   She has gained some weight, unintentional. She continues on letrozole.   She did see Dr. Mckee for her knee, and did not see excessive fluid which was reassuring.   Stamina is less, and she is walking a third of what she used to. This is not new and has been present over the last year. She is doing ok in terms of her compression garments. She last saw Mariana Guzman in the fall. She is using a flexitouch pump three times weekly. It is very old and needs refurbishing per Angeli and her .       Therapies/HEP,  Last did lymphedema therapy in fall of 2024.       Functionally,         Social history is unchanged.        Medications:  Current Outpatient Medications   Medication Sig Dispense Refill     calcium carbonate 600 mg-vitamin D 400 units (CALTRATE) 600-400 MG-UNIT per tablet Take 1 chew tab by mouth daily       clopidogrel (PLAVIX) 75 MG tablet Take 1 tablet (75 mg) by mouth daily 90 tablet 3     darifenacin ER (ENABLEX) 7.5 MG 24 hr tablet Take 1 tablet (7.5 mg) by mouth daily. 90 tablet 0     denosumab (PROLIA) 60 MG/ML SOSY injection        evolocumab (REPATHA) 140 MG/ML prefilled autoinjector Inject 1 mL (140 mg) subcutaneously every 14 days. 6 mL 3     ezetimibe (ZETIA) 10 MG tablet Take 1 tablet (10 mg) by mouth daily 90 tablet 3     letrozole (FEMARA) 2.5 MG tablet Take 1 tablet (2.5 mg) by mouth daily. 90 tablet 3     levothyroxine (SYNTHROID/LEVOTHROID) 75 MCG tablet Take  1 tablet (75 mcg) by mouth daily 90 tablet 3     magnesium oxide 200 MG TABS        multivitamin, therapeutic (THERA-VIT) TABS tablet Take 1 tablet by mouth daily       nitroFURantoin macrocrystal-monohydrate (MACROBID) 100 MG capsule TAKE 1 CAPSULE AFTER INTERCOURSE 30 capsule 0     rivaroxaban ANTICOAGULANT (XARELTO) 2.5 MG TABS tablet Take 1 tablet (2.5 mg) by mouth 2 times daily. 180 tablet 3     rosuvastatin (CRESTOR) 20 MG tablet Take 1 tablet (20 mg) by mouth daily 90 tablet 3     Sharps Container MISC Use as per  instructions for safe needle disposal 1 each 1     sulfamethoxazole-trimethoprim (BACTRIM DS) 800-160 MG tablet Take 1 tablet by mouth 2 times daily for 7 days. 14 tablet 0              Physical Exam:   Pulse 64   Temp 97.3  F (36.3  C) (Oral)   Resp 16   Wt 73.4 kg (161 lb 12.8 oz)   LMP  (LMP Unknown)   SpO2 96%   BMI 26.12 kg/m    Gen: NAD, pleasant and cooperative   HEENT: MMM  Pulm: non-labored breathing in room air  Abd: benign, superficial and deep palpation does not reveal any abnormalities.  No fluid shift noted.  Ext: Mild lymphedema in LLE extending from thigh to foot, stable from previous visit.  Compression garment on left lower extremity, nontender calves bilaterally, nontender knees to palpation  Neuro/MSK:   -Strength 5/5 in right hip flexors, right knee flexors, right knee extensors, bilateral ankle dorsiflexion and ankle plantarflexion  -Strength 3/5 in left hip flexors, 4/5 in left knee extensors and knee flexors  -Sensation intact to soft touch of bilateral lower extremities    Labs/Imaging:  Lab Results   Component Value Date    WBC 5.9 03/18/2025    HGB 14.0 03/18/2025    HCT 42.9 03/18/2025    MCV 91 03/18/2025     03/18/2025     Lab Results   Component Value Date     03/18/2025    POTASSIUM 4.4 03/18/2025    CHLORIDE 106 03/18/2025    CO2 24 03/18/2025    GLC 98 03/18/2025     Lab Results   Component Value Date    GFRESTIMATED 86 03/18/2025     GFRESTBLACK >90 05/17/2021     Lab Results   Component Value Date    AST 27 03/18/2025    ALT 25 03/18/2025    ALKPHOS 78 03/18/2025    BILITOTAL 0.3 03/18/2025    BILICONJ 0.0 06/06/2006     Lab Results   Component Value Date    INR 1.01 09/27/2019     Lab Results   Component Value Date    BUN 14.0 03/18/2025    CR 0.73 03/18/2025              Assessment/Plan   Angeli Jacobson presents to clinic today for follow up reg her rehab needs.   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 Was last seen in clinic on 3/12/24. Please see below recommendations from today's visit.    1.Therapy/equipment/braces:  New referral to lymphedema therapy for skin fibrosis and swelling control.   Continue with LLE compression garments (day and night) and wrapping.  Continue flexitouch pump. Pump is greater than 15 years old and in need of replacement.   Angeli Jacobson is a patient who I have been treating for swelling due to lymphedema as a result of previous surgical/radiation intervention.  They have had aggressive treatment including education, elevation, medications, exercise, therapy, massage, bandaging and compression garments. Angeli Jacobson has been compliant with the program for swelling, but despite this continues to have problems with continued swelling, pain, scarring, fibrosis, epithelial hyperplasia and decreased functional mobility.  Swelling and fullness with fibrosis and scarring extends to the lower trunk.  It is my medical opinion a new replacement advanced lymphatic pump is required, as her current flexitouch pump is over 15 years old.  I am requesting a new Flexitouch lymphatic pump.    Follow up: 9 months- 1 year or as needed      Leeann Sharma MD  Physical Medicine & Rehabilitation      50 minutes spent on the date of the encounter doing chart review, history and exam, documentation and further activities as noted above.               Again, thank you for allowing me to participate in the care of your patient.        Sincerely,        Leeann Sharma MD    Electronically signed

## 2025-03-25 NOTE — PROGRESS NOTES
Infusion Nursing Note:  Angelinydia Jacobson presents today for prolia.    Patient seen by provider today: Yes, Zachary   present during visit today: Not Applicable.    Note: N/A.      Intravenous Access:  No Intravenous access/labs at this visit.    Treatment Conditions:  Lab Results   Component Value Date     03/18/2025    POTASSIUM 4.4 03/18/2025    MAG 1.8 05/01/2018    CR 0.98 (H) 03/25/2025    LONG 10.6 (H) 03/25/2025    BILITOTAL 0.3 03/18/2025    ALBUMIN 4.3 03/25/2025    ALT 25 03/18/2025    AST 27 03/18/2025       Results reviewed, labs MET treatment parameters, ok to proceed with treatment.      Post Infusion Assessment:  Patient tolerated injection without incident.       Discharge Plan:   Discharge instructions reviewed with: Patient.  Patient and/or family verbalized understanding of discharge instructions and all questions answered.  AVS to patient via [employer].  Patient will return 7/30 for next appointment.   Patient discharged in stable condition accompanied by: self.  Departure Mode: Ambulatory.      Ange Zepeda RN

## 2025-03-26 ENCOUNTER — VIRTUAL VISIT (OUTPATIENT)
Dept: OTHER | Facility: CLINIC | Age: 75
End: 2025-03-26
Attending: INTERNAL MEDICINE
Payer: COMMERCIAL

## 2025-03-26 ENCOUNTER — PATIENT OUTREACH (OUTPATIENT)
Dept: ONCOLOGY | Facility: CLINIC | Age: 75
End: 2025-03-26
Payer: COMMERCIAL

## 2025-03-26 DIAGNOSIS — Z98.890 HISTORY OF FEMOROPOPLITEAL BYPASS: ICD-10-CM

## 2025-03-26 DIAGNOSIS — E78.5 HYPERLIPIDEMIA LDL GOAL <70: ICD-10-CM

## 2025-03-26 DIAGNOSIS — I89.0 LYMPHEDEMA OF LEFT LEG: ICD-10-CM

## 2025-03-26 DIAGNOSIS — I73.9 PAD (PERIPHERAL ARTERY DISEASE): ICD-10-CM

## 2025-03-26 DIAGNOSIS — E03.9 HYPOTHYROIDISM, UNSPECIFIED TYPE: ICD-10-CM

## 2025-03-26 PROCEDURE — 98006 SYNCH AUDIO-VIDEO EST MOD 30: CPT | Performed by: INTERNAL MEDICINE

## 2025-03-26 RX ORDER — ROSUVASTATIN CALCIUM 20 MG/1
20 TABLET, COATED ORAL DAILY
Qty: 90 TABLET | Refills: 3 | Status: SHIPPED | OUTPATIENT
Start: 2025-03-26

## 2025-03-26 RX ORDER — CLOPIDOGREL BISULFATE 75 MG/1
75 TABLET ORAL DAILY
Qty: 90 TABLET | Refills: 3 | Status: SHIPPED | OUTPATIENT
Start: 2025-03-26

## 2025-03-26 RX ORDER — EZETIMIBE 10 MG/1
10 TABLET ORAL DAILY
Qty: 90 TABLET | Refills: 3 | Status: SHIPPED | OUTPATIENT
Start: 2025-03-26

## 2025-03-26 RX ORDER — LEVOTHYROXINE SODIUM 75 UG/1
75 TABLET ORAL DAILY
Qty: 90 TABLET | Refills: 3 | Status: SHIPPED | OUTPATIENT
Start: 2025-03-26

## 2025-03-26 NOTE — PROGRESS NOTES
Virtual Visit Details    Type of service:  Video Visit     Originating Location (pt. Location): Home  Distant Location (provider location):  On-site  Platform used for Video Visit: Jenni Bowser MA    Provider visit note:    Follow-up visit  Review of recent labs  Known history of hyperlipidemia, hypertension,PAD, status post femoropopliteal bypass,  lymphedema and hypothyroidism etc.  She made good progress with adjustment of medications LDL is excellent range currently taking Zetia 10, Crestor 20, Repatha  Recently seen by cardiology service they suggested adjusting some of the medications  Elevated calcium but she takes calcium supplement, Prolia seeing oncologist workup negative  Requesting medication refills  She is scheduled to undergo leg ultrasound studies on May 15, 2025  No rest pain no claudication symptoms    For full details please see my previous office visit notes    Review of systems: Reviewed all 12 point review of systems as per HPI otherwise unremarkable    Physical exam:( no physical exam done this is virtual visit)    Reviewed recent laboratory tests, imaging studies in the epic and updated chart    Assessment and plan:  1. Hyperlipidemia LDL goal <70  Well-controlled with Repatha, Zetia and Crestor , LDL in 30s range  Decrease Zetia dose to 5 mg daily cut the pill into half  Continue rest of the medications same plan for fasting lipids in 2 months order placed     2. Lymphedema of left leg  She was seen and evaluated recently lymphedema therapist continue the same plan elevate the leg,  lose weight use compression stockings and pump therapy     3. History of arterial bypass of Left lower extremity  Aggressively control risk factors currently taking Plavix and PAD dose of Xarelto doing well  Scheduled for imaging studies in May 15 complete them  Complete labs then see me in first week of June 2025        4. Hypothyroidism, unspecified type  Clinically euthyroid and recent thyroid  function tests are normal continue the same and take medication as advised        Video Visit Details    Type of Service: Video Visit    Video Start Time: 4:00 PM   Video End Time: 4:20 PM     Total 30 minutes spent on the date of the encounter doing chart review, review of send labs, medication refills, future imaging orders, history, documentation and addressed above-mentioned issues.  AVS with written instructions given  Answered all her questions    The longitudinal care of plan for the above diagnoses was addressed during this visit. Due to added complexity of care, we will continue to supprt Angeli Jacobson and the subsequent management of this/these conditions and with ongoing continuity of care for this/these conditions.       Originating Location (patient location): Home    Distant Location (provider location): [facility]    Mode of Communication:  Video Conference via [employer]        This visit is being conducted as a virtual visit due to the emphasis on mitigation of the COVID-19 virus pandemic. The clinician has decided that the risk of an in-office visit outweighs the benefit for this patient.      Tiesha Prince MD

## 2025-03-26 NOTE — PATIENT INSTRUCTIONS
Cut Zetia into half and continue crestor and Repatha same     Refilled medications     Drink plenty of water    Go for imaging studies in May 2025  and fasting labs then see me a week later

## 2025-03-26 NOTE — PROGRESS NOTES
Flexitouch order with Dr. Sharma's recent visit note sent to [facility]     Gauri Britton, RN, BSN  Specialty Care Coordinator  [facility]  [phone]

## 2025-03-31 ENCOUNTER — MYC REFILL (OUTPATIENT)
Dept: UROLOGY | Facility: CLINIC | Age: 75
End: 2025-03-31
Payer: COMMERCIAL

## 2025-03-31 DIAGNOSIS — N32.81 OAB (OVERACTIVE BLADDER): ICD-10-CM

## 2025-04-01 ENCOUNTER — DOCUMENTATION ONLY (OUTPATIENT)
Dept: ONCOLOGY | Facility: CLINIC | Age: 75
End: 2025-04-01
Payer: COMMERCIAL

## 2025-04-01 RX ORDER — DARIFENACIN 7.5 MG/1
7.5 TABLET, EXTENDED RELEASE ORAL DAILY
Qty: 90 TABLET | Refills: 0 | Status: SHIPPED | OUTPATIENT
Start: 2025-04-01

## 2025-04-01 NOTE — NURSING NOTE
Face sheet and lymphedema site clarification provided to Jamil Lindsey with [employer]    Osiris Santoyo RN

## 2025-04-07 NOTE — PROGRESS NOTES
PHYSICAL THERAPY EVALUATION  Type of Visit: Evaluation       Fall Risk Screen:  Fall screen completed by: PT  Have you fallen 2 or more times in the past year?: No  Have you fallen and had an injury in the past year?: No  Is patient a fall risk?: Yes; Department fall risk interventions implemented    Subjective         Presenting condition or subjective complaint: Recommended maintenance sessions per Dr Leeann Sharma  Date of onset: 03/25/25 (date of order: Leeann Sharma MD)    Relevant medical history: Bladder or bowel problems; Hearing problems; History of fractures; Osteoporosis L LE lymphedema exacerbation; may benefit from updated Flexitouch pump as >15 years old PMHX:12/14/2022 s/p R lumpectomy and SLND -2/2 with recommended 5years hormone blocker Anastrozole; h/o LLE peripheral artery disease s/p femoral-popliteal bypass along w/ stent to the bypass later, on DAPT, 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   Dates & types of surgery: between 2018 and 2023 I had 6 (?) surgeries - L Hip, L knee, L femoral artery, and R breast cancer, 2023     in 2023    Prior diagnostic imaging/testing results:       Prior therapy history for the same diagnosis, illness or injury: Yes October 2024 - lymphedema therapyyes; custom class 3 Mediven 550 open toe 5/2024 and one in 6/2024; and Tribute Wrap night garment.and class 3 open toe thigh high comrpession stocking 10/1/2024   HEP: Home lymphedema program : using Flexitouch pump M,W,F and GCB 3x/week alternating with Tribute wrap night garment and alternating new class 3 custom thigh high with older custom class 3 thigh high, LE edema ex 1x/week, self massage 1x/week.     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, estrogen receptor positive (H) [C50.411, Z17.0]  - Primary      Lymphedema of left leg [I89.0]      Physical deconditioning [R53.81]      Insurance; [employer] MEDICARE ADVANTAGE     Prior Level of Function  Transfers: Independent  Ambulation: Independent  ADL: Independent  IADL:  independent  EXERCISE:     Living Environment  Social support: With a significant other or spouse Chris  Type of home: House; 2-story 2 story home  Stairs to enter the home: Yes 2 Is there a railing: No   no  Ramp: No   Stairs inside the home: Yes 11 Is there a railing: Yes   1 step  Help at home: None  Equipment owned: Four-point cane; Grab bars; Raised toilet seat  Four-point cane; Grab bars uses cane in more crowded events or with longer walks     Employment: No  retired  Hobbies/Interests: Reading traveling; word puzzles; cooking-baking; grandchildren; [employer] group     Patient goals for therapy: The swelling about the left knee remains, stiffness remains. Would like better mobility. The lymphedema I think remains the same - up and down responding to activity. The knee affects slacks, jeans, fit as much as the edema.    Pain assessment:  intermittent sharp \"pulsing\" pain of L medial knee 3-4x/year often at rest lasting hours/ through night. Limited impact with movement, ice/heat or massage . Possibly stable status x at least 4 years.      Objective       EDEMA EVALUATION  Additional history:  Body part affected by edema: Left legL LE  If cancer related, treatment:  s/p sarcoma  If not cancer related, problems with veins or cause of swelling:    Distance able to walk: 4-5 blocks  Time able to stand: an hour  Sensation problems in hands/feet: Yes L foot has sensation of being in a paper bag. Occasionally there is an episode lasting several hours of pinching, stabbing pain on the inside of the left knee. One time this sensation happened on the left shin bone area.decreased L foot  Edema etiology: Cancer with lymph node " "dissection, Chemo, Radiation, Surgery, GISSELL    FUNCTIONAL SCALES   Lymphedema Life Impact Scale (LLIS): 23    Cognitive Status Examination  Orientation: Oriented to person, place and time   Level of Consciousness: Alert  Follows Commands and Answers Questions: 100% of the time  Personal Safety and Judgement: Intact    EDEMA  Skin Condition:  Lipodermatosclerosis, Non-pitting, Pitting, mild errthyma of L anterior mid shin ; significant hyperplasia and rubbery fibrosis throughout L LE especially L calf and knee; 1+ pretibial pitting L LE; nonpitting puffy edema knee and thigh and L lower quadrant  Scar:  Yes; anteiror L knee extending proxmially as well as medial L knee and thigh and signficant adhenace of L proxiaml medial thigh  Capillary Refill: Symmetrical  Stemmer Sign: +; L only  Ulceration: No    GIRTH MEASUREMENTS: Refer to separate girth measurement flowsheet for specific measurements.    VOLUME LE:  10/2024 L LE to 40cm = 2744ml (stable status); R LE = 2076ml (stable status); L>R = 33%     Right LE Total Volume (mL): to 70cm: 6526ml; to 40cm R LE = 6526ml  Left LE Total Volume (mL): to 70cm = 7246ml; to 40cm = 2716ml  LE Limb Comparison: to 40cm (to knee) L>R 31%   Widest knee circumference : L = 43cm             RANGE OF MOTION:  WFL except L knee flexion 0-82 , decreased L hip flexion and L DF = 0  STRENGTH:  L knee extension = 4+/5; decreased L hip L hip flexion 2+/5 ; L hip abd = 3+/ 5, adduction = 4-/5  BED MOBILITY:  sleeping in regular bed  TRANSFERS:  moderate UE support  GAIT/LOCOMOTION: decreased L knee flexion during swing; decreased pace   BALANCE:  SLS: L = 5\"  SENSATION:  decreased light touch dorsal L foot and toes  VASCULAR:  stable L thigh arterial stent    Assessment & Plan   CLINICAL IMPRESSIONS  Medical Diagnosis: 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  The following significant findings have been identified: Pain, Decreased ROM/flexibility, Decreased strength, Impaired balance, Impaired sensation, Edema, Impaired gait, Impaired muscle performance, and Decreased activity tolerance. These impairments interfere with their ability to perform self care tasks, recreational activities, household chores, household mobility, and community mobility as compared to previous level of function.     Clinical Decision Making (Complexity):  Clinical Presentation: Stable/Uncomplicated  Clinical Presentation Rationale: based on medical and personal factors listed in PT evaluation  Clinical Decision Making (Complexity): Low complexity    PLAN OF CARE  Treatment Interventions:  Interventions: Gait Training, Manual Therapy, Neuromuscular Re-education, Therapeutic Exercise, Self-Care/Home Management    Long Term Goals     PT Goal 1  Goal Identifier: Home program  Goal Description: Patient will be independent in home program to manage conditions of lymphedema and fibrosis. as well as decreased strength, rom and balance issues  Rationale: to maximize safety and independence with performance of ADLs and functional tasks;to maximize safety and independence within the home;to maximize safety and independence within the community;to maximize safety and independence with self cares  Target Date: 07/08/25  PT Goal 2  Goal Identifier: Edema / Volume  Goal Description: Patient will demonstrate stable  volume of L  LE to decrease risk of infection.  Rationale: to maximize safety and independence with performance of ADLs and functional tasks;to maximize safety and  independence within the home;to maximize safety and independence within the community;to maximize safety and independence with transportation;to maximize safety and independence with self cares  Goal Progress: eval: L >R = 31%  Target Date: 07/08/25  PT Goal 3  Goal Identifier: LLIS  Goal Description: Patient will demonstrate an 5 point decrease in LLIS to demonstrate a decreased impact of lymphedema on function.  Rationale: to maximize safety and independence with performance of ADLs and functional tasks;to maximize safety and independence within the home;to maximize safety and independence within the community;to maximize safety and independence with transportation;to maximize safety and independence with self cares  Goal Progress: evaL = 23  Target Date: 07/08/25      Frequency of Treatment: 1x/week x 6 weeks then decrease to 2x/month x 2 months  Duration of Treatment: 90 days    Education Assessment:   Learner/Method: Patient    Risks and benefits of evaluation/treatment have been explained.   Patient/Family/caregiver agrees with Plan of Care.     Evaluation Time:     PT Rosanne, Low Complexity Minutes (86790): 30     Signing Clinician: Mariana Guzman, PT        [facility]                                                                                   OUTPATIENT PHYSICAL THERAPY      PLAN OF TREATMENT FOR OUTPATIENT REHABILITATION   Patient's Last Name, First Name, Angeli Pond YOB: 1950   Provider's Name   [facility]   Medical Record No.  [id number]     Onset Date: 03/25/25 (date of order: Leeann Sharma MD)  Start of Care Date:       Medical Diagnosis:  Malignant neoplasm of upper-outer quadrant of right breast in female, estrogen receptor positive (H) (C50.411, Z17.0)  - Primary    Lymphedema of left leg (I89.0)    Physical deconditioning (R53.81)      PT Treatment Diagnosis:  lymphedema, fibrosis, pain, decreased  strength, impaired ROM (L hip, Lknee, L ankle), decresed balance and aerobic fitness Plan of Treatment  Frequency/Duration: 1x/week x 6 weeks then decrease to 2x/month x 2 months/ 90 days    Certification date from   to           See note for plan of treatment details and functional goals     Mariana Guzman, PT                         I CERTIFY THE NEED FOR THESE SERVICES FURNISHED UNDER        THIS PLAN OF TREATMENT AND WHILE UNDER MY CARE     (Physician attestation of this document indicates review and certification of the therapy plan).              Referring Provider:  Leeann Sharma    Initial Assessment  See Epic Evaluation-

## 2025-04-10 ENCOUNTER — THERAPY VISIT (OUTPATIENT)
Dept: PHYSICAL THERAPY | Facility: CLINIC | Age: 75
End: 2025-04-10
Attending: STUDENT IN AN ORGANIZED HEALTH CARE EDUCATION/TRAINING PROGRAM
Payer: COMMERCIAL

## 2025-04-10 DIAGNOSIS — Z85.831 HISTORY OF SARCOMA OF SOFT TISSUE: ICD-10-CM

## 2025-04-10 DIAGNOSIS — C50.411 MALIGNANT NEOPLASM OF UPPER-OUTER QUADRANT OF RIGHT BREAST IN FEMALE, ESTROGEN RECEPTOR POSITIVE (H): ICD-10-CM

## 2025-04-10 DIAGNOSIS — M25.60 DECREASED RANGE OF MOTION: ICD-10-CM

## 2025-04-10 DIAGNOSIS — L90.5 SCAR CONDITION AND FIBROSIS OF SKIN: ICD-10-CM

## 2025-04-10 DIAGNOSIS — R53.1 DECREASED STRENGTH: ICD-10-CM

## 2025-04-10 DIAGNOSIS — R53.81 PHYSICAL DECONDITIONING: ICD-10-CM

## 2025-04-10 DIAGNOSIS — M79.662 PAIN OF LEFT LOWER LEG: ICD-10-CM

## 2025-04-10 DIAGNOSIS — Z17.0 MALIGNANT NEOPLASM OF UPPER-OUTER QUADRANT OF RIGHT BREAST IN FEMALE, ESTROGEN RECEPTOR POSITIVE (H): ICD-10-CM

## 2025-04-10 DIAGNOSIS — I89.0 LYMPHEDEMA OF LEFT LEG: Primary | ICD-10-CM

## 2025-04-17 ENCOUNTER — THERAPY VISIT (OUTPATIENT)
Dept: PHYSICAL THERAPY | Facility: CLINIC | Age: 75
End: 2025-04-17
Attending: STUDENT IN AN ORGANIZED HEALTH CARE EDUCATION/TRAINING PROGRAM
Payer: COMMERCIAL

## 2025-04-17 DIAGNOSIS — L90.5 SCAR CONDITION AND FIBROSIS OF SKIN: ICD-10-CM

## 2025-04-17 DIAGNOSIS — R53.1 DECREASED STRENGTH: ICD-10-CM

## 2025-04-17 DIAGNOSIS — M25.60 DECREASED RANGE OF MOTION: ICD-10-CM

## 2025-04-17 DIAGNOSIS — Z85.831 HISTORY OF SARCOMA OF SOFT TISSUE: ICD-10-CM

## 2025-04-17 DIAGNOSIS — M79.662 PAIN OF LEFT LOWER LEG: ICD-10-CM

## 2025-04-17 DIAGNOSIS — I89.0 LYMPHEDEMA: Primary | ICD-10-CM

## 2025-04-17 DIAGNOSIS — R53.81 PHYSICAL DECONDITIONING: ICD-10-CM

## 2025-04-20 NOTE — PLAN OF CARE
As above   Occupational Therapy Discharge Summary    Reason for therapy discharge:    All goals and outcomes met, no further needs identified.    Progress towards therapy goal(s). See goals on Care Plan in [facility] electronic health record for goal details.  Goals met    Therapy recommendation(s):    Defer to ortho MD

## 2025-04-24 ENCOUNTER — THERAPY VISIT (OUTPATIENT)
Dept: PHYSICAL THERAPY | Facility: CLINIC | Age: 75
End: 2025-04-24
Attending: STUDENT IN AN ORGANIZED HEALTH CARE EDUCATION/TRAINING PROGRAM
Payer: COMMERCIAL

## 2025-04-24 DIAGNOSIS — Z85.831 HISTORY OF SARCOMA OF SOFT TISSUE: ICD-10-CM

## 2025-04-24 DIAGNOSIS — R53.81 PHYSICAL DECONDITIONING: ICD-10-CM

## 2025-04-24 DIAGNOSIS — M25.60 DECREASED RANGE OF MOTION: ICD-10-CM

## 2025-04-24 DIAGNOSIS — I89.0 LYMPHEDEMA OF LEFT UPPER EXTREMITY: Primary | ICD-10-CM

## 2025-04-24 DIAGNOSIS — R53.1 DECREASED STRENGTH: ICD-10-CM

## 2025-04-24 DIAGNOSIS — M79.662 PAIN OF LEFT LOWER LEG: ICD-10-CM

## 2025-04-24 DIAGNOSIS — L90.5 SCAR CONDITION AND FIBROSIS OF SKIN: ICD-10-CM

## 2025-04-29 ENCOUNTER — MEDICAL CORRESPONDENCE (OUTPATIENT)
Dept: HEALTH INFORMATION MANAGEMENT | Facility: CLINIC | Age: 75
End: 2025-04-29

## 2025-04-29 ENCOUNTER — MYC MEDICAL ADVICE (OUTPATIENT)
Dept: INTERNAL MEDICINE | Facility: CLINIC | Age: 75
End: 2025-04-29
Payer: COMMERCIAL

## 2025-04-29 ENCOUNTER — LAB (OUTPATIENT)
Dept: LAB | Facility: CLINIC | Age: 75
End: 2025-04-29
Payer: COMMERCIAL

## 2025-04-29 ENCOUNTER — E-VISIT (OUTPATIENT)
Dept: INTERNAL MEDICINE | Facility: CLINIC | Age: 75
End: 2025-04-29
Payer: COMMERCIAL

## 2025-04-29 DIAGNOSIS — R30.0 DYSURIA: ICD-10-CM

## 2025-04-29 DIAGNOSIS — R30.0 DYSURIA: Primary | ICD-10-CM

## 2025-04-29 LAB
ALBUMIN UR-MCNC: NEGATIVE MG/DL
APPEARANCE UR: ABNORMAL
BACTERIA #/AREA URNS HPF: ABNORMAL /HPF
BILIRUB UR QL STRIP: NEGATIVE
COLOR UR AUTO: YELLOW
GLUCOSE UR STRIP-MCNC: NEGATIVE MG/DL
HGB UR QL STRIP: ABNORMAL
KETONES UR STRIP-MCNC: NEGATIVE MG/DL
LEUKOCYTE ESTERASE UR QL STRIP: ABNORMAL
NITRATE UR QL: NEGATIVE
PH UR STRIP: 7 [PH] (ref 5–7)
RBC #/AREA URNS AUTO: ABNORMAL /HPF
SP GR UR STRIP: 1.01 (ref 1–1.03)
SQUAMOUS #/AREA URNS AUTO: ABNORMAL /LPF
UROBILINOGEN UR STRIP-ACNC: 0.2 E.U./DL
WBC #/AREA URNS AUTO: ABNORMAL /HPF
WBC CLUMPS #/AREA URNS HPF: PRESENT /HPF

## 2025-04-29 PROCEDURE — 81001 URINALYSIS AUTO W/SCOPE: CPT

## 2025-04-29 PROCEDURE — 87086 URINE CULTURE/COLONY COUNT: CPT

## 2025-04-29 PROCEDURE — 87088 URINE BACTERIA CULTURE: CPT

## 2025-04-29 PROCEDURE — 87186 SC STD MICRODIL/AGAR DIL: CPT

## 2025-04-29 RX ORDER — SULFAMETHOXAZOLE AND TRIMETHOPRIM 800; 160 MG/1; MG/1
1 TABLET ORAL 2 TIMES DAILY
Qty: 14 TABLET | Refills: 0 | Status: SHIPPED | OUTPATIENT
Start: 2025-04-29 | End: 2025-05-06

## 2025-04-29 NOTE — TELEPHONE ENCOUNTER
See my chart     Advised E visit, expressed understanding and acceptance of the plan. had no further questions at this time.  Advised can call back to clinic at any time with concerns.      Sandra Allen RN

## 2025-04-29 NOTE — PATIENT INSTRUCTIONS
I am sorry you do not feel so well.   I will send a prescription for an antibiotic. ( The one that worked)  It would be better if you can come to the lab and leave an urine sample before you start the antibitotic..  Make sure you drink plenty of fluids.   I hope you feel better soon.    Sincerely,    Edel Way MD  Internal Medicine

## 2025-04-30 LAB — BACTERIA UR CULT: ABNORMAL

## 2025-05-01 ENCOUNTER — THERAPY VISIT (OUTPATIENT)
Dept: PHYSICAL THERAPY | Facility: CLINIC | Age: 75
End: 2025-05-01
Payer: COMMERCIAL

## 2025-05-01 DIAGNOSIS — R53.81 PHYSICAL DECONDITIONING: ICD-10-CM

## 2025-05-01 DIAGNOSIS — M79.662 PAIN OF LEFT LOWER LEG: ICD-10-CM

## 2025-05-01 DIAGNOSIS — L90.5 SCAR CONDITION AND FIBROSIS OF SKIN: ICD-10-CM

## 2025-05-01 DIAGNOSIS — M25.60 DECREASED RANGE OF MOTION: ICD-10-CM

## 2025-05-01 DIAGNOSIS — Z85.831 HISTORY OF SARCOMA OF SOFT TISSUE: ICD-10-CM

## 2025-05-01 DIAGNOSIS — I89.0 LYMPHEDEMA OF LEFT UPPER EXTREMITY: Primary | ICD-10-CM

## 2025-05-01 DIAGNOSIS — C50.411 MALIGNANT NEOPLASM OF UPPER-OUTER QUADRANT OF RIGHT BREAST IN FEMALE, ESTROGEN RECEPTOR POSITIVE (H): ICD-10-CM

## 2025-05-01 DIAGNOSIS — R53.1 DECREASED STRENGTH: ICD-10-CM

## 2025-05-01 DIAGNOSIS — Z17.0 MALIGNANT NEOPLASM OF UPPER-OUTER QUADRANT OF RIGHT BREAST IN FEMALE, ESTROGEN RECEPTOR POSITIVE (H): ICD-10-CM

## 2025-05-03 ENCOUNTER — HEALTH MAINTENANCE LETTER (OUTPATIENT)
Age: 75
End: 2025-05-03

## 2025-05-06 NOTE — PROGRESS NOTES
Medication Therapy Management (MTM) Encounter    ASSESSMENT:                            Medication Adherence/Access: No issues identified.    Hyperlipidemia   Stable.    Osteoporosis:   Stable.    Hypothyroidism   Stable. Last TSH is within normal limits.     Supplements   Stable.    OAB/Recurrent UTI  Improved.    PAD  Stable.    Breast Cancer  Stable. Continue following with oncology.    PLAN:                            Continue your current medications unchanged.    Follow-up: Return in about 1 year (around 5/7/2026) for Medication Therapy Management.    SUBJECTIVE/OBJECTIVE:                          Angeli Jacobson is a 75 year old female seen for an initial visit. She was referred to me from self.      Reason for visit: med review.    Allergies/ADRs: Reviewed in chart  Past Medical History: Reviewed in chart  Tobacco: She reports that she has never smoked. She has never been exposed to tobacco smoke. She has never used smokeless tobacco.  Alcohol: not currently using    Medication Adherence/Access: Patient uses pill box(es).  Per patient, misses medication 0 time(s) per week.   Medication barriers: none.     Hyperlipidemia   rosuvastatin 20 mg daily  Ezetimibe (Zetia) 10 mg once daily  Evolocumab (Repatha) 140 mg every 2 weeks  Patient reports no significant myalgias or other side effects.  The ASCVD Risk score ([place] DK, et al., 2019) failed to calculate for the following reasons:    The valid total cholesterol range is 130 to 320 mg/dL     Recent Labs   Lab Test 03/18/25  0810 09/27/24  0836   CHOL 108 102   HDL 56 52   LDL 34 31   TRIG 88 95       Bone Health   Osteoporosis:   calcium 600 mg/Vitamin D 400 units - take one tab daily - reports she also has calcium in her multivitamin  Vitamin D3 1000 mg daily  denosumab (Prolia) 60 mg subcutaneous every 6 months (last injection 3/23/25)  Patient is not experiencing side effects.  DEXA History: 12/26/24  INTERVAL CHANGE  -There has been a 1.8% increase in  lumbar spine BMD.  -There has been a1.4 % increase in the right hip BMD.  IMPRESSION: Low bone density (OSTEOPENIA). T score meets the WHO criteria for low bone density (osteopenia) at one or more measured sites. The risk of osteoporotic fracture increases approximately two-fold for each standard deviation decrease in T-score.   Patient is getting calcium in milk, yogurt, cottage cheese, and cheese.  Risk factors: post-menopausal       Hypothyroidism   Levothyroxine 75 mcg daily.   Patient is having the following symptoms: none.      TSH   Date Value Ref Range Status   03/18/2025 1.15 0.30 - 4.20 uIU/mL Final   03/02/2022 0.59 0.40 - 4.00 mU/L Final   05/17/2021 4.19 (H) 0.40 - 4.00 mU/L Final       Supplements   Magnesium oxide 200 mg daily  Multivitamin daily  No reported issues at this time.        OAB/Recurrent UTI  Darifenacin 7.5 mg daily  Macrobid as needed after intercourse  She has just completed a course of Bactrim.  No concerns with side effects  Does feel the the darifenacin has helped.  Patient follows with urology, next visit 7/9/25.    PAD  Clopidogrel 75 mg daily  Xarelto 2.5 mg twice daily   No concerns with abnormal bruising or bleeding.    Breast Cancer  Letrozole 2.5 mg daily  Reports this causes her to feel tired and be a little constipation. Also thinks it has worsened her mood a little since she has started. Reports psyllium has helped manage the constipation side effects.  Initial diagnosis 11/2022.  Patient follows with oncology, next visit 7/30/25.    Today's Vitals: LMP  (LMP Unknown)   ----------------      I spent 25 minutes with this patient today. All changes were made via collaborative practice agreement with Edle Mccarty MD.     A summary of these recommendations was sent via [employer].    Bharti Bower PharmD  Medication Therapy Management Pharmacist  Voicemail: [phone]      Telemedicine Visit Details  The patient's medications can be safely assessed via a  telemedicine encounter.  Type of service:  Telephone visit  Originating Location (pt. Location): Home    Distant Location (provider location):  On-site  Start Time: 10:00 AM  End Time: 10:25 AM     Medication Therapy Recommendations  No medication therapy recommendations to display

## 2025-05-07 ENCOUNTER — VIRTUAL VISIT (OUTPATIENT)
Dept: PHARMACY | Facility: CLINIC | Age: 75
End: 2025-05-07
Payer: COMMERCIAL

## 2025-05-07 DIAGNOSIS — E78.5 HYPERLIPIDEMIA LDL GOAL <70: Primary | ICD-10-CM

## 2025-05-07 DIAGNOSIS — C50.411 MALIGNANT NEOPLASM OF UPPER-OUTER QUADRANT OF RIGHT BREAST IN FEMALE, ESTROGEN RECEPTOR POSITIVE (H): ICD-10-CM

## 2025-05-07 DIAGNOSIS — M81.0 AGE-RELATED OSTEOPOROSIS WITHOUT CURRENT PATHOLOGICAL FRACTURE: ICD-10-CM

## 2025-05-07 DIAGNOSIS — I73.9 PAD (PERIPHERAL ARTERY DISEASE): ICD-10-CM

## 2025-05-07 DIAGNOSIS — Z17.0 MALIGNANT NEOPLASM OF UPPER-OUTER QUADRANT OF RIGHT BREAST IN FEMALE, ESTROGEN RECEPTOR POSITIVE (H): ICD-10-CM

## 2025-05-07 DIAGNOSIS — N32.81 OAB (OVERACTIVE BLADDER): ICD-10-CM

## 2025-05-07 DIAGNOSIS — E03.9 HYPOTHYROIDISM, UNSPECIFIED TYPE: ICD-10-CM

## 2025-05-07 DIAGNOSIS — Z78.9 TAKES DIETARY SUPPLEMENTS: ICD-10-CM

## 2025-05-07 PROCEDURE — 99607 MTMS BY PHARM ADDL 15 MIN: CPT | Mod: 93 | Performed by: PHARMACIST

## 2025-05-07 PROCEDURE — 99605 MTMS BY PHARM NP 15 MIN: CPT | Mod: 93 | Performed by: PHARMACIST

## 2025-05-07 RX ORDER — VITAMIN B COMPLEX
1 TABLET ORAL DAILY
COMMUNITY

## 2025-05-07 RX ORDER — GLUCOSAMINE/CHONDR SU A SOD 750-600 MG
1 TABLET ORAL DAILY
COMMUNITY

## 2025-05-07 NOTE — LETTER
_  Medication List        Prepared on: May 7, 2025     Bring your Medication List when you go to the doctor, hospital, or   emergency room. And, share it with your family or caregivers.     Note any changes to how you take your medications.  Cross out medications when you no longer use them.    Medication How I take it Why I use it Prescriber   calcium carbonate 600 mg-vitamin D 400 units (CALTRATE) 600-400 MG-UNIT per tablet Take 1 chew tab by mouth daily  General Health   Patient Reported   clopidogrel (PLAVIX) 75 MG tablet Take 1 tablet (75 mg) by mouth daily. PAD (Peripheral Artery Disease); History of femoropopliteal bypass Tiesha Prince MD   darifenacin ER (ENABLEX) 7.5 MG 24 hr tablet Take 1 tablet (7.5 mg) by mouth daily. OAB (overactive bladder) Nancy Chawla PA-C   denosumab (PROLIA) 60 MG/ML SOSY injection  Every 6 months  Osteoporosis Edel Nell Mccarty MD   evolocumab (REPATHA) 140 MG/ML prefilled autoinjector Inject 1 mL (140 mg) subcutaneously every 14 days. Hyperlipidemia LDL Goal <70 Spencer Wilder MD   ezetimibe (ZETIA) 10 MG tablet Take 1 tablet (10 mg) by mouth daily. Hyperlipidemia LDL Goal <70 Tiesha Prince MD   letrozole (FEMARA) 2.5 MG tablet Take 1 tablet (2.5 mg) by mouth daily. Malignant neoplasm of upper-outer quadrant of right breast in female, estrogen receptor positive (H) Emilia Angulo MD   levothyroxine (SYNTHROID/LEVOTHROID) 75 MCG tablet Take 1 tablet (75 mcg) by mouth daily. Hypothyroidism, unspecified type Tiesha Prince MD   Lutein-Zeaxanthin 25-5 MG CAPS Take 1 tablet by mouth daily.  General Health   Patient Reported   magnesium oxide 200 MG TABS Take 200 mg by mouth daily. General Health    Patient Reported   multivitamin, therapeutic (THERA-VIT) TABS tablet Take 1 tablet by mouth daily General Health    Patient Reported   nitroFURantoin macrocrystal-monohydrate (MACROBID) 100 MG capsule TAKE 1 CAPSULE AFTER  INTERCOURSE Recurrent UTI Edel Mccarty MD   psyllium (METAMUCIL/KONSYL) capsule Take 5 capsules by mouth daily.  Constipation Patient Reported   rivaroxaban ANTICOAGULANT (XARELTO) 2.5 MG TABS tablet Take 1 tablet (2.5 mg) by mouth 2 times daily. History of arterial bypass of lower extremity Tiesha Prince MD   rosuvastatin (CRESTOR) 20 MG tablet Take 1 tablet (20 mg) by mouth daily. Hyperlipidemia LDL Goal <70 Tiesha Prince MD   Sharps Container MISC Use as per  instructions for safe needle disposal Coronary artery disease involving native coronary artery of native heart without angina pectoris; Hyperlipidemia LDL Goal <70 Spencer Wilder MD   Vitamin D3 (VITAMIN D, CHOLECALCIFEROL,) 25 mcg (1000 units) tablet Take 1 tablet by mouth daily.  General Health   Patient Reported         Add new medications, over-the-counter drugs, herbals, vitamins, or  minerals in the blank rows below.    Medication How I take it Why I use it Prescriber                                      Allergies:      - Celexa [citalopram Hydrobromide]        Side effects I have had:      Not on File        Other Information:              My notes and questions:

## 2025-05-07 NOTE — PATIENT INSTRUCTIONS
"Recommendations from today's MTM visit:                                                    MTM (medication therapy management) is a service provided by a clinical pharmacist designed to help you get the most of out of your medicines.   Today we reviewed what your medicines are for, how to know if they are working, that your medicines are safe and how to make your medicine regimen as easy as possible.      Continue your current medications unchanged.    Follow-up: Return in about 1 year (around 5/7/2026) for Medication Therapy Management.    It was great speaking with you today.  I value your experience and would be very thankful for your time in providing feedback in our clinic survey. In the next few days, you may receive an email or text message from [employer] with a link to a survey related to your  clinical pharmacist.\"     To schedule another MTM appointment, please call the clinic directly or you may call the MTM scheduling line at [phone].    My Clinical Pharmacist's contact information:                                                      Please feel free to contact me with any questions or concerns you have.      Bharti Bower, PharmD  Medication Therapy Management Pharmacist  Voicemail: [phone]     "

## 2025-05-07 NOTE — LETTER
May 7, 2025  Angeli PARTH Kavon  [address]  [place] [address]    Dear TURNER Breen [facility]     Thank you for talking with me on May 7, 2025 about your health and medications. As a follow-up to our conversation, I have included two documents:      Your Recommended To-Do List has steps you should take to get the best results from your medications.  Your Medication List will help you keep track of your medications and how to take them.    If you want to talk about these documents, please call Bharti Bower RPH at phone: [phone], Monday-Friday 8-4:30pm.    I look forward to working with you and your doctors to make sure your medications work well for you.    Sincerely,  Bharti Bower RPH  [facility] Pharmacist, [facility]

## 2025-05-07 NOTE — LETTER
"Recommended To-Do List      Prepared on: May 7, 2025       You can get the best results from your medications by completing the items on this \"To-Do List.\"      Bring your To-Do List when you go to your doctor. And, share it with your family or caregivers.    My To-Do List:  What we talked about: What I should do:    What my medicines are for, how to know if my medicines are working, made sure my medicines are safe for me and reviewed how to take my medicines.      Take my medicines every day                "

## 2025-05-08 ENCOUNTER — THERAPY VISIT (OUTPATIENT)
Dept: PHYSICAL THERAPY | Facility: CLINIC | Age: 75
End: 2025-05-08
Payer: COMMERCIAL

## 2025-05-08 DIAGNOSIS — L90.5 SCAR CONDITION AND FIBROSIS OF SKIN: ICD-10-CM

## 2025-05-08 DIAGNOSIS — Z17.0 MALIGNANT NEOPLASM OF UPPER-OUTER QUADRANT OF RIGHT BREAST IN FEMALE, ESTROGEN RECEPTOR POSITIVE (H): ICD-10-CM

## 2025-05-08 DIAGNOSIS — I89.0 LYMPHEDEMA OF LEFT UPPER EXTREMITY: Primary | ICD-10-CM

## 2025-05-08 DIAGNOSIS — M25.60 DECREASED RANGE OF MOTION: ICD-10-CM

## 2025-05-08 DIAGNOSIS — C50.411 MALIGNANT NEOPLASM OF UPPER-OUTER QUADRANT OF RIGHT BREAST IN FEMALE, ESTROGEN RECEPTOR POSITIVE (H): ICD-10-CM

## 2025-05-08 DIAGNOSIS — R53.1 DECREASED STRENGTH: ICD-10-CM

## 2025-05-08 DIAGNOSIS — Z85.831 HISTORY OF SARCOMA OF SOFT TISSUE: ICD-10-CM

## 2025-05-09 ENCOUNTER — MEDICAL CORRESPONDENCE (OUTPATIENT)
Dept: HEALTH INFORMATION MANAGEMENT | Facility: CLINIC | Age: 75
End: 2025-05-09
Payer: COMMERCIAL

## 2025-05-14 ENCOUNTER — THERAPY VISIT (OUTPATIENT)
Dept: PHYSICAL THERAPY | Facility: CLINIC | Age: 75
End: 2025-05-14
Payer: COMMERCIAL

## 2025-05-14 DIAGNOSIS — Z17.0 MALIGNANT NEOPLASM OF UPPER-OUTER QUADRANT OF RIGHT BREAST IN FEMALE, ESTROGEN RECEPTOR POSITIVE (H): ICD-10-CM

## 2025-05-14 DIAGNOSIS — Z85.831 HISTORY OF SARCOMA OF SOFT TISSUE: ICD-10-CM

## 2025-05-14 DIAGNOSIS — R53.1 DECREASED STRENGTH: ICD-10-CM

## 2025-05-14 DIAGNOSIS — L90.5 SCAR CONDITION AND FIBROSIS OF SKIN: ICD-10-CM

## 2025-05-14 DIAGNOSIS — I89.0 LYMPHEDEMA OF LEFT UPPER EXTREMITY: Primary | ICD-10-CM

## 2025-05-14 DIAGNOSIS — M25.60 DECREASED RANGE OF MOTION: ICD-10-CM

## 2025-05-14 DIAGNOSIS — C50.411 MALIGNANT NEOPLASM OF UPPER-OUTER QUADRANT OF RIGHT BREAST IN FEMALE, ESTROGEN RECEPTOR POSITIVE (H): ICD-10-CM

## 2025-05-14 PROCEDURE — 97140 MANUAL THERAPY 1/> REGIONS: CPT | Mod: GP | Performed by: PHYSICAL THERAPIST

## 2025-05-14 PROCEDURE — 97535 SELF CARE MNGMENT TRAINING: CPT | Mod: GP | Performed by: PHYSICAL THERAPIST

## 2025-05-14 PROCEDURE — 97110 THERAPEUTIC EXERCISES: CPT | Mod: GP | Performed by: PHYSICAL THERAPIST

## 2025-05-14 NOTE — PROGRESS NOTES
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  At that time she had arterial reconstruction using a PTFE interposition graft.  That graft occluded in 2019 and I performed a redo left femoral to above-knee popliteal bypass with 6 mm ringed PTFE graft.  She is not diabetic and does not smoke.  She denies claudication.  She does have ongoing issues with left leg lymphedema and utilizes a thigh-high compression stocking of 30-40 mmHg on a daily basis.  She presents today for annual left leg graft ultrasound and an ADRIAN with exercise.     She follows with Dr. Prince in vascular medicine. She is on empiric low-dose Xarelto and continued Plavix. At today's visit she had no complaints.     Exam:  Well-developed female alert and oriented x 3.  VSS  She is wearing a thigh-high compression stocking.  Lymphedematous changes of the left leg.  Easily palpable DP pulses bilaterally.     Imaging:  Resting and postexercise ABIs today are normal.  Ultrasound demonstrates a widely patent left femoropopliteal bypass graft.     RECOMMENDATION/RISKS: Continue medical regimen including low dose xarelto and plavix. Continue utilizing a left thigh-high compression stocking of 30-40 mmHg pressure. Follow-up in 1 year with vascular surgery with repeat imaging.       REVIEW OF SYSTEMS:    A 12 point ROS was reviewed and is negative except for what is listed above in HPI.    PHH:    Past Medical History:   Diagnosis Date    * * * SBE PROPHYLAXIS * * * 1998    Amox 500mg, take 4 tabs one hour prior to procedure.Takes this because of lymphedema secondary from leg  surgey    Antiplatelet or antithrombotic long-term use     Arrhythmia     Central serous retinopathy 2001    Resolved 9/2001    CHRONIC NECK PAIN 1995    Depressive disorder     Depressive disorder, not elsewhere classified 2001    Elevated coronary artery calcium score=7/1/21 08/03/2021    Elevated coronary artery calcium score=7/1/21 08/03/2021    History of blood transfusion 04/2019 12/2020    during left hip pinning, during surgery for patella    Lateral epicondylitis     MIXED HYPERLIPIDEMIA, LDL GOAL <160 1998    LDL goal < 160    Motion sickness     MYXOID LIPOSARCOMA 2000    Left thigh, S/P excision, radiation  at [facility]    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    CHRONIC LEFT THIGH LYMPHEDEMA    Nontoxic multinodular goiter 2005    needs yearly US    ALEXANDER (obstructive sleep apnea) 7/3/2024    Osteoporosis, unspecified 2001    Other chronic pain     fx ribs left     Other lymphedema 2000    left thigh, gets regular PT for this    Overactive bladder     PAD (peripheral artery disease) 04/20/2018    PONV (postoperative nausea and vomiting)     SHINGLES 2001    Sprain of lumbosacral (joint) (ligament) 1995    right    Unspecified hearing loss 1998    chronic tinnitus    Unspecified tinnitus 1998          Past Surgical History:   Procedure Laterality Date    ARTHROPLASTY HIP Left 10/4/2019    Procedure: Removal of left femoral hardware with a conversion to left total hip arthroplasty using a Biomet Annalisa femoral stem with an OsseoTi acetabular shell and a dual mobility bearing surface;  Surgeon: Spencer Celeste MD;  Location: [facility] OR    BIOPSY  April 2000    BIOPSY BREAST SEED LOCALIZATION Right 12/14/2022    Procedure: right breast tag localized lumpectomy;  Surgeon: Shelly Carr MD;  Location: [facility] OR    BIOPSY NODE SENTINEL Right 12/14/2022    Procedure: with right sentinel lymph node biopsy;  Surgeon: Shelly Carr MD;  Location: [facility] OR    BYPASS GRAFT FEMOROPOPLITEAL Left 1/21/2019     Procedure: LEFT FEMORAL TO ABOVE KNEE POPLITEAL  BYPASS WITH POLYTETRAFLUOROETHYLENE GRAFT;  Surgeon: Shade Owens MD;  Location: [facility] OR    COLONOSCOPY N/A 2/5/2016    Procedure: COMBINED COLONOSCOPY, SINGLE OR MULTIPLE BIOPSY/POLYPECTOMY BY BIOPSY;  Surgeon: Varun Stanley MD, MD;  Location:  GI    COLONOSCOPY N/A 12/10/2021    Procedure: COLONOSCOPY, WITH POLYPECTOMIES  USING COLD  SNARE,   CLIP APPLIED X2;  Surgeon: Dayton Luna MD;  Location:  GI    CYSTOSCOPY      EXCISION MALIG LESION>1.25CM  5/2000    Myxoid Liposarcoma      EXPLORE GROIN Right 5/1/2018    Procedure: EXPLORE GROIN;  EMERGENCY RIGHT FEMORAL EXPLORATION WITH FEMORAL ARTERY REPAIR.    EBL: 50mL;  Surgeon: Shade Owens MD;  Location: [facility] OR    HC COLP CERVIX/UPPER VAGINA  07/1997    Negative    HC DILATION/CURETTAGE DIAG/THER NON OB  02/1997    Post menopausal bleeding on HRT, negative    HIP SURGERY Left 04/13/2019    IR ANGIOGRAM THROUGH CATHETER FOLLOW UP  12/20/2018    IR ANGIOGRAM THROUGH CATHETER FOLLOW UP  12/21/2018    IR LOWER EXTREMITY ANGIOGRAM LEFT  12/19/2018    OPEN REDUCTION INTERNAL FIXATION PATELLA Left 12/21/2020    Procedure: Left partial patella fracture excision with advancement of the quadriceps tendon;  Surgeon: Stephen Rhodes MD;  Location: [facility] OR    OPEN REDUCTION INTERNAL FIXATION RODDING INTRAMEDULLARY TIBIA Left 12/21/2020    Procedure: Open reduction intramedullary nailing of left tibia fracture;  Surgeon: Stephen Rhodes MD;  Location: [facility] OR    REMOVE HARDWARE KNEE Left 5/31/2022    Procedure: Left knee patella hardware removal;  Surgeon: Spencer Celeste MD;  Location:  OR    REPAIR TENDON QUADRICEPS Left 7/28/2021    Procedure: Left knee quadricepsplasty with intraoperative patellar fracture requiring open reduction and internal fixation of the patella;  Surgeon: Spencer Celeste MD;  Location: [facility] OR    REPAIR TENDON QUADRICEPS Left 5/31/2022    Procedure: Open left  knee VY quadricepsplasty with hardware removal and allograft;  Surgeon: Spencer Celeste MD;  Location: [facility] OR    VASCULAR SURGERY  04/30/2018    Left SFA stent in bypass graft    [facility] APPENDECTOMY      Appendectomy    [facility] NONSPECIFIC PROCEDURE  04/2000    Open Biopsy Left Thigh Liposarcoma    [facility] COLONOSCOPY THRU STOMA, DIAGNOSTIC  2006    due 2010       ALLERGIES:  Celexa [citalopram hydrobromide]    MEDS:    Current Outpatient Medications:     calcium carbonate 600 mg-vitamin D 400 units (CALTRATE) 600-400 MG-UNIT per tablet, Take 1 chew tab by mouth daily, Disp: , Rfl:     clopidogrel (PLAVIX) 75 MG tablet, Take 1 tablet (75 mg) by mouth daily., Disp: 90 tablet, Rfl: 3    darifenacin ER (ENABLEX) 7.5 MG 24 hr tablet, Take 1 tablet (7.5 mg) by mouth daily., Disp: 90 tablet, Rfl: 0    denosumab (PROLIA) 60 MG/ML SOSY injection, , Disp: , Rfl:     evolocumab (REPATHA) 140 MG/ML prefilled autoinjector, Inject 1 mL (140 mg) subcutaneously every 14 days., Disp: 6 mL, Rfl: 3    ezetimibe (ZETIA) 10 MG tablet, Take 1 tablet (10 mg) by mouth daily., Disp: 90 tablet, Rfl: 3    letrozole (FEMARA) 2.5 MG tablet, Take 1 tablet (2.5 mg) by mouth daily., Disp: 90 tablet, Rfl: 3    levothyroxine (SYNTHROID/LEVOTHROID) 75 MCG tablet, Take 1 tablet (75 mcg) by mouth daily., Disp: 90 tablet, Rfl: 3    Lutein-Zeaxanthin 25-5 MG CAPS, Take 1 tablet by mouth daily., Disp: , Rfl:     magnesium oxide 200 MG TABS, Take 200 mg by mouth daily., Disp: , Rfl:     multivitamin, therapeutic (THERA-VIT) TABS tablet, Take 1 tablet by mouth daily, Disp: , Rfl:     nitroFURantoin macrocrystal-monohydrate (MACROBID) 100 MG capsule, TAKE 1 CAPSULE AFTER INTERCOURSE, Disp: 30 capsule, Rfl: 0    psyllium (METAMUCIL/KONSYL) capsule, Take 5 capsules by mouth daily., Disp: , Rfl:     rivaroxaban ANTICOAGULANT (XARELTO) 2.5 MG TABS tablet, Take 1 tablet (2.5 mg) by mouth 2 times daily., Disp: 180 tablet, Rfl: 3    rosuvastatin (CRESTOR) 20 MG  tablet, Take 1 tablet (20 mg) by mouth daily., Disp: 90 tablet, Rfl: 3    Sharps Container MISC, Use as per  instructions for safe needle disposal, Disp: 1 each, Rfl: 1    Vitamin D3 (VITAMIN D, CHOLECALCIFEROL,) 25 mcg (1000 units) tablet, Take 1 tablet by mouth daily., Disp: , Rfl:     SOCIAL HABITS:    History   Smoking Status    Never   Smokeless Tobacco    Never     Social History    Substance and Sexual Activity      Alcohol use: Never      History   Drug Use Unknown       FAMILY HISTORY:    Family History   Problem Relation Age of Onset    C.A.D. Father         MI 57    Alcohol/Drug Father         etoh    Coronary Artery Disease Father     Obesity Mother     Osteoporosis Mother     Colon Cancer Brother 70    Hyperlipidemia Son     Anxiety Disorder Son     Hyperlipidemia Son         very high, experimental drug    C.A.D. Paternal Grandmother         ascvd    Diabetes Maternal Grandmother     Cancer Maternal Grandmother     C.A.D. Paternal Uncle         Mi  age 48    Cancer Maternal Aunt         pancreatic CA    Hyperlipidemia Son     Hyperlipidemia Cousin        PE:  LMP  (LMP Unknown)   Wt Readings from Last 1 Encounters:   25 161 lb 12.8 oz (73.4 kg)     There is no height or weight on file to calculate BMI.    E    LABS:      Sodium   Date Value Ref Range Status   2025 143 135 - 145 mmol/L Final   2024 143 135 - 145 mmol/L Final   2024 143 135 - 145 mmol/L Final   2021 142 133 - 144 mmol/L Final   2020 137 133 - 144 mmol/L Final   2020 140 133 - 144 mmol/L Final     Urea Nitrogen   Date Value Ref Range Status   2025 14.0 8.0 - 23.0 mg/dL Final   2024 12.1 8.0 - 23.0 mg/dL Final   2024 15.8 8.0 - 23.0 mg/dL Final   2022 17 7 - 30 mg/dL Final   2022 15 7 - 30 mg/dL Final   2021 9 7 - 30 mg/dL Final   2021 13 7 - 30 mg/dL Final   2020 14 7 - 30 mg/dL Final   2020 16 7 - 30 mg/dL Final      Hemoglobin   Date Value Ref Range Status   03/18/2025 14.0 11.7 - 15.7 g/dL Final   09/27/2024 14.1 11.7 - 15.7 g/dL Final   12/08/2022 13.5 11.7 - 15.7 g/dL Final   05/17/2021 13.2 11.7 - 15.7 g/dL Final   12/23/2020 8.5 (L) 11.7 - 15.7 g/dL Final   12/22/2020 8.6 (L) 11.7 - 15.7 g/dL Final     Platelet Count   Date Value Ref Range Status   03/18/2025 228 150 - 450 10e3/uL Final   09/27/2024 221 150 - 450 10e3/uL Final   12/08/2022 252 150 - 450 10e3/uL Final   05/17/2021 245 150 - 450 10e9/L Final   12/20/2020 214 150 - 450 10e9/L Final   12/19/2020 283 150 - 450 10e9/L Final     INR   Date Value Ref Range Status   09/27/2019 1.01 0.86 - 1.14 Final   12/19/2018 0.97 0.86 - 1.14 Final   05/01/2018 1.43 (H) 0.86 - 1.14 Final       30 minutes spent on the day of encounter doing chart review, history and exam, documentation, and further activities as noted.       Hue Muñoz NP  VASCULAR SURGERY

## 2025-05-15 ENCOUNTER — HOSPITAL ENCOUNTER (OUTPATIENT)
Dept: ULTRASOUND IMAGING | Facility: CLINIC | Age: 75
Discharge: HOME | End: 2025-05-15
Attending: SURGERY
Payer: COMMERCIAL

## 2025-05-15 ENCOUNTER — OFFICE VISIT (OUTPATIENT)
Dept: OTHER | Facility: CLINIC | Age: 75
End: 2025-05-15
Payer: COMMERCIAL

## 2025-05-15 VITALS — DIASTOLIC BLOOD PRESSURE: 75 MMHG | HEART RATE: 77 BPM | SYSTOLIC BLOOD PRESSURE: 119 MMHG

## 2025-05-15 DIAGNOSIS — I73.9 PAD (PERIPHERAL ARTERY DISEASE): Primary | ICD-10-CM

## 2025-05-15 DIAGNOSIS — Z98.890 HISTORY OF FEMOROPOPLITEAL BYPASS: ICD-10-CM

## 2025-05-15 DIAGNOSIS — I73.9 PAD (PERIPHERAL ARTERY DISEASE): ICD-10-CM

## 2025-05-15 PROCEDURE — 93924 LWR XTR VASC STDY BILAT: CPT

## 2025-05-15 PROCEDURE — 93926 LOWER EXTREMITY STUDY: CPT | Mod: LT

## 2025-05-15 PROCEDURE — G0463 HOSPITAL OUTPT CLINIC VISIT: HCPCS

## 2025-05-15 RX ORDER — INFLUENZA A VIRUS A/VICTORIA/4897/2022 IVR-238 (H1N1) ANTIGEN (FORMALDEHYDE INACTIVATED), INFLUENZA A VIRUS A/CALIFORNIA/122/2022 SAN-022 (H3N2) ANTIGEN (FORMALDEHYDE INACTIVATED), AND INFLUENZA B VIRUS B/MICHIGAN/01/2021 ANTIGEN (FORMALDEHYDE INACTIVATED) 60; 60; 60 UG/.5ML; UG/.5ML; UG/.5ML
INJECTION, SUSPENSION INTRAMUSCULAR
COMMUNITY
Start: 2024-09-23

## 2025-05-15 RX ORDER — COVID-19 VACCINE, MRNA 0.04 MG/.418ML
INJECTION, SUSPENSION INTRAMUSCULAR
COMMUNITY
Start: 2024-09-23

## 2025-05-15 NOTE — PROGRESS NOTES
[facility]        Patient is here for a  follow up.    Pt is currently taking Plavix and Xarelto.    /75 (BP Location: Left arm, Patient Position: Chair, Cuff Size: Adult Regular)   Pulse 77   LMP  (LMP Unknown)     The provider has been notified that the patient has no concerns.     Questions patient would like addressed today are: N/A.    Refills are needed: N/A    Has homecare services and agency name:  Ninfa Bowser MA

## 2025-05-29 ENCOUNTER — LAB (OUTPATIENT)
Dept: LAB | Facility: CLINIC | Age: 75
End: 2025-05-29
Payer: COMMERCIAL

## 2025-05-29 DIAGNOSIS — E78.5 HYPERLIPIDEMIA LDL GOAL <70: ICD-10-CM

## 2025-05-29 DIAGNOSIS — I89.0 LYMPHEDEMA OF LEFT LEG: ICD-10-CM

## 2025-05-29 LAB
ALBUMIN SERPL BCG-MCNC: 4.4 G/DL (ref 3.5–5.2)
ALP SERPL-CCNC: 69 U/L (ref 40–150)
ALT SERPL W P-5'-P-CCNC: 30 U/L (ref 0–50)
ANION GAP SERPL CALCULATED.3IONS-SCNC: 6 MMOL/L (ref 7–15)
AST SERPL W P-5'-P-CCNC: 33 U/L (ref 0–45)
BASOPHILS # BLD AUTO: 0 10E3/UL (ref 0–0.2)
BASOPHILS NFR BLD AUTO: 0 %
BILIRUB SERPL-MCNC: 0.3 MG/DL
BUN SERPL-MCNC: 14.2 MG/DL (ref 8–23)
CALCIUM SERPL-MCNC: 10.1 MG/DL (ref 8.8–10.4)
CHLORIDE SERPL-SCNC: 107 MMOL/L (ref 98–107)
CHOLEST SERPL-MCNC: 109 MG/DL
CREAT SERPL-MCNC: 0.65 MG/DL (ref 0.51–0.95)
EGFRCR SERPLBLD CKD-EPI 2021: >90 ML/MIN/1.73M2
EOSINOPHIL # BLD AUTO: 0.1 10E3/UL (ref 0–0.7)
EOSINOPHIL NFR BLD AUTO: 2 %
ERYTHROCYTE [DISTWIDTH] IN BLOOD BY AUTOMATED COUNT: 13 % (ref 10–15)
FASTING STATUS PATIENT QL REPORTED: YES
FASTING STATUS PATIENT QL REPORTED: YES
GLUCOSE SERPL-MCNC: 95 MG/DL (ref 70–99)
HCO3 SERPL-SCNC: 29 MMOL/L (ref 22–29)
HCT VFR BLD AUTO: 43.3 % (ref 35–47)
HDLC SERPL-MCNC: 62 MG/DL
HGB BLD-MCNC: 14.2 G/DL (ref 11.7–15.7)
IMM GRANULOCYTES # BLD: 0 10E3/UL
IMM GRANULOCYTES NFR BLD: 0 %
LDLC SERPL CALC-MCNC: 30 MG/DL
LYMPHOCYTES # BLD AUTO: 1.6 10E3/UL (ref 0.8–5.3)
LYMPHOCYTES NFR BLD AUTO: 33 %
MCH RBC QN AUTO: 29.8 PG (ref 26.5–33)
MCHC RBC AUTO-ENTMCNC: 32.8 G/DL (ref 31.5–36.5)
MCV RBC AUTO: 91 FL (ref 78–100)
MONOCYTES # BLD AUTO: 0.6 10E3/UL (ref 0–1.3)
MONOCYTES NFR BLD AUTO: 11 %
NEUTROPHILS # BLD AUTO: 2.7 10E3/UL (ref 1.6–8.3)
NEUTROPHILS NFR BLD AUTO: 54 %
NONHDLC SERPL-MCNC: 47 MG/DL
PLATELET # BLD AUTO: 231 10E3/UL (ref 150–450)
POTASSIUM SERPL-SCNC: 4 MMOL/L (ref 3.4–5.3)
PROT SERPL-MCNC: 7.1 G/DL (ref 6.4–8.3)
RBC # BLD AUTO: 4.77 10E6/UL (ref 3.8–5.2)
SODIUM SERPL-SCNC: 142 MMOL/L (ref 135–145)
TRIGL SERPL-MCNC: 87 MG/DL
WBC # BLD AUTO: 5.1 10E3/UL (ref 4–11)

## 2025-05-30 ENCOUNTER — RESULTS FOLLOW-UP (OUTPATIENT)
Dept: OTHER | Facility: CLINIC | Age: 75
End: 2025-05-30

## 2025-06-12 ENCOUNTER — OFFICE VISIT (OUTPATIENT)
Dept: OTHER | Facility: CLINIC | Age: 75
End: 2025-06-12
Attending: INTERNAL MEDICINE
Payer: COMMERCIAL

## 2025-06-12 VITALS
SYSTOLIC BLOOD PRESSURE: 122 MMHG | WEIGHT: 161.4 LBS | OXYGEN SATURATION: 97 % | BODY MASS INDEX: 26.05 KG/M2 | HEART RATE: 63 BPM | DIASTOLIC BLOOD PRESSURE: 80 MMHG

## 2025-06-12 DIAGNOSIS — E78.5 HYPERLIPIDEMIA LDL GOAL <70: ICD-10-CM

## 2025-06-12 DIAGNOSIS — Z95.828 HISTORY OF ARTERIAL BYPASS OF LOWER EXTREMITY: ICD-10-CM

## 2025-06-12 DIAGNOSIS — E03.9 HYPOTHYROIDISM, UNSPECIFIED TYPE: ICD-10-CM

## 2025-06-12 DIAGNOSIS — I89.0 LYMPHEDEMA OF LEFT LEG: Primary | ICD-10-CM

## 2025-06-12 PROCEDURE — G0463 HOSPITAL OUTPT CLINIC VISIT: HCPCS | Performed by: INTERNAL MEDICINE

## 2025-06-12 RX ORDER — RIVAROXABAN 2.5 MG/1
2.5 TABLET, FILM COATED ORAL 2 TIMES DAILY
Qty: 180 TABLET | Refills: 3 | Status: SHIPPED | OUTPATIENT
Start: 2025-06-12

## 2025-06-12 NOTE — PROGRESS NOTES
SUBJECTIVE:  Follow-up visit  Multiple issues   Review of recent labs and  imaging studies   Med refills   Tolerating crestor, Zetia and PCSK 9 inhibitor  Lipids well-controlled   She is able to walk half a mile without any problems  History of left lower extremity arterial bypass for PAD  She broke her left hip in April 2019 while in [place] underwent ORIF followed by left total hip replacement on October 4, 2019 at [facility]   History of breast cancer underwent Sx and XRT seeing Oncologist   She accompanied by her  today for this visit  History of present illness:   Angeli Jacobson is a 75 year old very pleasant female with past medical history of left thigh/groin sarcoma underwent surgery with lymphadenectomy and radiation therapy in the year 2000 at [facility] followed by she developed peripheral arterial disease underwent  Redo left common femoral to a bony popliteal artery bypass with 6 mm PTFE graft in January 2019 and since then reasonably doing well as for as the PAD concerned visited [place] in April and she fell down broke her left hip underwent ORIF over there.   She has a long-standing history of lymphedema after sarcoma surgery and radiation therapy.  Using compression stockings etc..  She was also evaluated extensively at lymphedema clinic and currently using pump  On October 4, 2019 she underwent left total hip replacement .  She has been taking both Plavix and low-dose Xarelto 2.5 mg twice a day     HISTORIES:  PROBLEM LIST:   Patient Active Problem List   Diagnosis    MULTINODUL GOITER    Hearing loss    Hyperlipidemia LDL goal <70    Osteoporosis    Impaired fasting glucose    Lymphedema of left leg    Tubular adenoma    Vertigo    Myxoid liposarcoma (HCC)    PAD (peripheral artery disease)    Vascular graft occlusion    Femoral-popliteal bypass graft occlusion, left    History of sarcoma    S/P ORIF (open reduction internal fixation) fracture     Hip pain, left    Postural urinary incontinence    Personal history of urinary tract infection    OAB (overactive bladder)    Myalgia of pelvic floor    Lesion of bladder    S/P total hip arthroplasty    Acute pain of left knee    Closed fracture of left tibia and fibula, initial encounter    Closed displaced fracture of left patella, unspecified fracture morphology, initial encounter    Other specified injury of left quadriceps muscle, fascia and tendon, initial encounter    Elevated coronary artery calcium score=7/1/21     Arthrofibrosis of knee joint, left    Hypothyroidism, unspecified type    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 , 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 - s/p lumpectomy + radiation    Left knee pain    Long term current use of aromatase inhibitor    ALEXANDER on CPAP     PAST MEDICAL HISTORY:  Past Medical History:   Diagnosis Date    * * * SBE PROPHYLAXIS * * * 1998    Amox 500mg, take 4 tabs one hour prior to procedure.Takes this because of lymphedema secondary from leg surgey    Antiplatelet or antithrombotic long-term use     Arrhythmia     Central serous retinopathy 2001    Resolved 9/2001    CHRONIC NECK PAIN 1995    Depressive disorder     Depressive disorder, not elsewhere classified 2001    Elevated coronary artery calcium score=7/1/21 08/03/2021    Elevated coronary artery calcium score=7/1/21 08/03/2021    History of blood transfusion 04/2019 12/2020    during left hip pinning, during surgery for patella    Lateral epicondylitis     MIXED HYPERLIPIDEMIA, LDL GOAL <160 1998    LDL goal < 160    Motion sickness     MYXOID LIPOSARCOMA 2000    Left thigh, S/P excision, radiation  at [facility]    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    CHRONIC LEFT THIGH LYMPHEDEMA    Nontoxic multinodular goiter 2005    needs yearly US    ALEXANDER (obstructive sleep apnea) 7/3/2024    Osteoporosis, unspecified 2001    Other chronic pain     fx ribs left     Other lymphedema 2000    left thigh, gets  regular PT for this    Overactive bladder     PAD (peripheral artery disease) 04/20/2018    PONV (postoperative nausea and vomiting)     SHINGLES 2001    Sprain of lumbosacral (joint) (ligament) 1995    right    Unspecified hearing loss 1998    chronic tinnitus    Unspecified tinnitus 1998     PAST SURGICAL HISTORY:  Past Surgical History:   Procedure Laterality Date    ARTHROPLASTY HIP Left 10/4/2019    Procedure: Removal of left femoral hardware with a conversion to left total hip arthroplasty using a Biomet Annalisa femoral stem with an OsseoTi acetabular shell and a dual mobility bearing surface;  Surgeon: Spencer Celeste MD;  Location: [facility] OR    BIOPSY  April 2000    BIOPSY BREAST SEED LOCALIZATION Right 12/14/2022    Procedure: right breast tag localized lumpectomy;  Surgeon: Shelly Carr MD;  Location:  OR    BIOPSY NODE SENTINEL Right 12/14/2022    Procedure: with right sentinel lymph node biopsy;  Surgeon: Shelly Carr MD;  Location:  OR    BYPASS GRAFT FEMOROPOPLITEAL Left 1/21/2019    Procedure: LEFT FEMORAL TO ABOVE KNEE POPLITEAL  BYPASS WITH POLYTETRAFLUOROETHYLENE GRAFT;  Surgeon: Shade Owens MD;  Location:  OR    COLONOSCOPY N/A 2/5/2016    Procedure: COMBINED COLONOSCOPY, SINGLE OR MULTIPLE BIOPSY/POLYPECTOMY BY BIOPSY;  Surgeon: Varun Stanley MD, MD;  Location:  GI    COLONOSCOPY N/A 12/10/2021    Procedure: COLONOSCOPY, WITH POLYPECTOMIES  USING COLD  SNARE,   CLIP APPLIED X2;  Surgeon: Dayton Luna MD;  Location:  GI    CYSTOSCOPY      EXCISION MALIG LESION>1.25CM  5/2000    Myxoid Liposarcoma      EXPLORE GROIN Right 5/1/2018    Procedure: EXPLORE GROIN;  EMERGENCY RIGHT FEMORAL EXPLORATION WITH FEMORAL ARTERY REPAIR.    EBL: 50mL;  Surgeon: Shade Owens MD;  Location:  OR    HC COLP CERVIX/UPPER VAGINA  07/1997    Negative    HC DILATION/CURETTAGE DIAG/THER NON OB  02/1997    Post menopausal bleeding on HRT, negative    HIP SURGERY Left  04/13/2019    IR ANGIOGRAM THROUGH CATHETER FOLLOW UP  12/20/2018    IR ANGIOGRAM THROUGH CATHETER FOLLOW UP  12/21/2018    IR LOWER EXTREMITY ANGIOGRAM LEFT  12/19/2018    OPEN REDUCTION INTERNAL FIXATION PATELLA Left 12/21/2020    Procedure: Left partial patella fracture excision with advancement of the quadriceps tendon;  Surgeon: Stephen Rhodes MD;  Location: [facility] OR    OPEN REDUCTION INTERNAL FIXATION RODDING INTRAMEDULLARY TIBIA Left 12/21/2020    Procedure: Open reduction intramedullary nailing of left tibia fracture;  Surgeon: Stephen Rhodes MD;  Location: [facility] OR    REMOVE HARDWARE KNEE Left 5/31/2022    Procedure: Left knee patella hardware removal;  Surgeon: Spencer Celeste MD;  Location: [facility] OR    REPAIR TENDON QUADRICEPS Left 7/28/2021    Procedure: Left knee quadricepsplasty with intraoperative patellar fracture requiring open reduction and internal fixation of the patella;  Surgeon: Spencer Celeste MD;  Location: [facility] OR    REPAIR TENDON QUADRICEPS Left 5/31/2022    Procedure: Open left knee VY quadricepsplasty with hardware removal and allograft;  Surgeon: Spencer Celeste MD;  Location: [facility] OR    VASCULAR SURGERY  04/30/2018    Left SFA stent in bypass graft    ZZC APPENDECTOMY      Appendectomy    ZZC NONSPECIFIC PROCEDURE  04/2000    Open Biopsy Left Thigh Liposarcoma    ZZHC COLONOSCOPY THRU STOMA, DIAGNOSTIC  2006    due 2010     CURRENT MEDICATIONS:  Current Outpatient Medications   Medication Sig Dispense Refill    calcium carbonate 600 mg-vitamin D 400 units (CALTRATE) 600-400 MG-UNIT per tablet Take 1 chew tab by mouth daily      clopidogrel (PLAVIX) 75 MG tablet Take 1 tablet (75 mg) by mouth daily. 90 tablet 3    COMIRNATY 30 MCG/0.3ML DAVIDA injection       darifenacin ER (ENABLEX) 7.5 MG 24 hr tablet Take 1 tablet (7.5 mg) by mouth daily. 90 tablet 0    denosumab (PROLIA) 60 MG/ML SOSY injection       evolocumab (REPATHA) 140 MG/ML prefilled autoinjector Inject  1 mL (140 mg) subcutaneously every 14 days. 6 mL 3    ezetimibe (ZETIA) 10 MG tablet Take 1 tablet (10 mg) by mouth daily. 90 tablet 3    FLUZONE HIGH-DOSE 0.5 ML injection       letrozole (FEMARA) 2.5 MG tablet Take 1 tablet (2.5 mg) by mouth daily. 90 tablet 3    levothyroxine (SYNTHROID/LEVOTHROID) 75 MCG tablet Take 1 tablet (75 mcg) by mouth daily. 90 tablet 3    Lutein-Zeaxanthin 25-5 MG CAPS Take 1 tablet by mouth daily.      magnesium oxide 200 MG TABS Take 200 mg by mouth daily.      multivitamin, therapeutic (THERA-VIT) TABS tablet Take 1 tablet by mouth daily      nitroFURantoin macrocrystal-monohydrate (MACROBID) 100 MG capsule TAKE 1 CAPSULE AFTER INTERCOURSE 30 capsule 0    psyllium (METAMUCIL/KONSYL) capsule Take 5 capsules by mouth daily.      rivaroxaban ANTICOAGULANT (XARELTO) 2.5 MG TABS tablet Take 1 tablet (2.5 mg) by mouth 2 times daily. 180 tablet 3    rosuvastatin (CRESTOR) 20 MG tablet Take 1 tablet (20 mg) by mouth daily. 90 tablet 3    Sharps Container MISC Use as per  instructions for safe needle disposal 1 each 1    Vitamin D3 (VITAMIN D, CHOLECALCIFEROL,) 25 mcg (1000 units) tablet Take 1 tablet by mouth daily.       ALLERGIES:  Allergies   Allergen Reactions    Celexa [Citalopram Hydrobromide]      Decreased libido     SOCIAL HISTORY:  Social History     Socioeconomic History    Marital status:      Spouse name: Chris    Number of children: 3    Years of education: 14    Highest education level: Associate degree: academic program   Occupational History    Occupation: at home     Employer: NONE    Tobacco Use    Smoking status: Never     Passive exposure: Never    Smokeless tobacco: Never   Vaping Use    Vaping status: Never Used   Substance and Sexual Activity    Alcohol use: Never    Drug use: Never    Sexual activity: Yes     Partners: Male     Birth control/protection: Male Surgical     Comment:  had a vasectomy   Other Topics Concern    Parent/sibling w/  CABG, MI or angioplasty before 65F 55M? No   Social History Narrative    Not on file     Social Drivers of Health     Financial Resource Strain: Low Risk  (7/19/2024)    Financial Resource Strain     Within the past 12 months, have you or your family members you live with been unable to get utilities (heat, electricity) when it was really needed?: No   Food Insecurity: Low Risk  (7/19/2024)    Food Insecurity     Within the past 12 months, did you worry that your food would run out before you got money to buy more?: No     Within the past 12 months, did the food you bought just not last and you didn t have money to get more?: No   Transportation Needs: Low Risk  (7/19/2024)    Transportation Needs     Within the past 12 months, has lack of transportation kept you from medical appointments, getting your medicines, non-medical meetings or appointments, work, or from getting things that you need?: No   Physical Activity: Inactive (7/19/2024)    Exercise Vital Sign     Days of Exercise per Week: 0 days     Minutes of Exercise per Session: 0 min   Stress: No Stress Concern Present (7/19/2024)    [employer] - Occupational Stress Questionnaire     Feeling of Stress : Only a little   Social Connections: Unknown (7/19/2024)    Social Connection and Isolation Panel [NHANES]     Frequency of Communication with Friends and Family: Not on file     Frequency of Social Gatherings with Friends and Family: Once a week     Attends [employer] Services: Not on file     Active Member of Clubs or Organizations: Not on file     Attends Club or Organization Meetings: Not on file     Marital Status: Not on file   Interpersonal Safety: Low Risk  (7/23/2024)    Interpersonal Safety     Do you feel physically and emotionally safe where you currently live?: Yes     Within the past 12 months, have you been hit, slapped, kicked or otherwise physically hurt by someone?: No     Within the past 12 months, have you been  humiliated or emotionally abused in other ways by your partner or ex-partner?: No   Housing Stability: Low Risk  (2024)    Housing Stability     Do you have housing? : Yes     Are you worried about losing your housing?: No     FAMILY HISTORY:  Family History   Problem Relation Age of Onset    C.A.D. Father         MI 57    Alcohol/Drug Father         etoh    Coronary Artery Disease Father     Obesity Mother     Osteoporosis Mother     Colon Cancer Brother 70    Hyperlipidemia Son     Anxiety Disorder Son     Hyperlipidemia Son         very high, experimental drug    C.A.D. Paternal Grandmother         ascvd    Diabetes Maternal Grandmother     Cancer Maternal Grandmother     C.A.D. Paternal Uncle         Mi  age 48    Cancer Maternal Aunt         pancreatic CA    Hyperlipidemia Son     Hyperlipidemia Cousin      REVIEW OF SYSTEMS:  CONSTITUTIONAL:no malaise, fatigue, or other general symptoms  EYES: no subjective changes in visual acuity, no photophobia  ENT/MOUTH: no complaints of rhinorrhea, nasal congestion, sore throat, hearing changes  RESP:no SOB  CV: no c/o exertional chest pressure or SALDANA  GI: No abdominal pain, constipation, change in bowel movements, nausea, pyrosis, BRBPR  :no polyuria or polydipsia, no dysuria, no gross hematuria  MUSCULOSKELATAL:no arthalgias or myalgias  INTEGUMENTARY/SKIN: no pruritis, rashes, or moles with recent change in size, shape, or pigmentation  NEURO: no gross sensory or motor symptoms, no dizziness, no confusion  ENDOCRINE: no polyuria or polydipsia, no heat or cold intolerance  HEME/ALLERGY/IMMUNE: no fevers, chills, night sweats, or unwanted weight loss  PSYCHIATRIC: no depression, anxiety, or internal stimuli  EXAM:  /80 (BP Location: Left arm, Patient Position: Sitting, Cuff Size: Adult Regular)   Pulse 63   Wt 161 lb 6.4 oz (73.2 kg)   LMP  (LMP Unknown)   SpO2 97%   BMI 26.05 kg/m    BMI: Body mass index is 26.05 kg/m .  GENERAL APPEARANCE:   Pleasant  Healthy appearing female , alert, active, no distress cooperative.  EXAM:  EYES: clear conjunctiva, no cataracts, no obvious fundoscopic abnormalities  HENT: oropharynx, nares, and TMs are WNL  NECK: no JVD, thyromegaly or lymphadenopathy, no cervical bruits  RESP: clear to auscultation without rales, wheezes, or rhonchi  CV: RRR, no murmurs, gallops, or rubs  LYMPH: no cervical , axillary, or inguinal lymphadenopathy appreciated  GI: NABS, ND/NT, no masses or organomegally appreciated  MS: Left lower extremity lymphedema stable   SKIN: no nevi clinically suspicious for malignancy are noted  NEURO: CN II-XII intact, no localizing sensory or motor abnoramlities noted, DTRs symmetrical bilaterally  PSYCH: Mental status exam reveals the pt to have normal mood and affect. There is no disorder of thought form or content. There is no response to internal stimuli. There is no suicidal or homicidal ideation.    EXAM: US LOWER EXTREMITY ARTERIAL DUPLEX LEFT, US ADRIAN DOPPLER WITH EXERCISE BILATERAL  LOCATION: [facility]  DATE: 5/15/2025  INDICATION: history of left femoral to above knee popliteal PTFE graft 2019  COMPARISON: Lower extremity arterial ultrasound and ADRIAN 5/15/2024  TECHNIQUE: Duplex imaging is performed utilizing gray-scale, two-dimensional images, and color-flow imaging. Doppler waveform analysis and spectral Doppler imaging is also performed  ADRIAN FINDINGS:   RIGHT                                                 Brachial: 130  Ankle (PT): 142 Index: 1.08    Ankle (DP): 144 Index: 1.09   LEFT  Brachial: 132  Ankle (DP): 146 Index: 1.11  WAVEFORMS: The dorsalis pedis and posterior tibial arteries show triphasic waveform     DUPLEX ARTERIAL ULTRASOUND FINDINGS:      LEFT LOWER EXTREMITY ARTERIAL ASSESSMENT:  The left femoral to above-knee popliteal artery bypass graft is widely patent. Normal multiphasic waveforms throughout. No focal velocity elevation to suggest flow-limiting  stenosis.                                                            IMPRESSION:  1.  Widely patent left femoral to popliteal artery bypass graft.  2.  Normal ABIs bilaterally.    A/P:  1. Hyperlipidemia LDL goal <70  Well-controlled with Repatha, Zetia and Crestor continue the same repeat fasting lipids, CMP labs in 12 months then office visit  2. Lymphedema of left leg  She was seen and evaluated recently lymphedema therapist continue the same plan elevate the leg,  lose weight use compression stockings and pump therapy  3. History of arterial bypass of Left lower extremity  Currently taking Plavix and low-dose Xarelto 2.5 mg twice a day tolerating without any problems and she is tolerating crestor 20 mg daily along with Zetia 10 mg daily and PCSK 9 inhibitor   She is able to walk half a mile without any problems  Arrange ADRIAN and left lower extremity arterial duplex for graft surveillance in May 2026 then followed by office visit  4. Hypothyroidism, unspecified type  Clinically euthyroid and recent thyroid function tests are normal continue the same and take medication as advised  Repeat thyroid panel in 12 months order placed  Refilled medications   40 minutes spent on the date of the encounter doing chart review, review of recent imaging studies, laboratory test, history, exam, documentation and addressed above-mentioned issues medications refilled lab orders placed coordinated future imaging studies and AVS with written instructions given  She accompanied by her  both of them a lot of questions all of them were answered  The longitudinal care of plan for the above diagnoses was addressed during this visit. Due to added complexity of care, we will continue to supprt Angeli Jacobson and the subsequent management of this/these conditions and with ongoing continuity of care for this/these conditions.     Tiesha Prince MD, JASON, [facility], [place]  Vascular Medicine  Clinical Lipidologist

## 2025-06-12 NOTE — PROGRESS NOTES
[facility]        Patient is here for a follow up.    Pt is currently taking Statin, Plavix, and Xarelto.    /80 (BP Location: Left arm, Patient Position: Sitting, Cuff Size: Adult Regular)   Pulse 63   Wt 161 lb 6.4 oz (73.2 kg)   LMP  (LMP Unknown)   SpO2 97%   BMI 26.05 kg/m      The provider has been notified that the patient has no concerns.     Questions patient would like addressed today are: N/A.    Refills are needed: No    Has homecare services and agency name:  No     Patient has been identified as a fall risk.    Interventions were completed as noted below:  [x]Exam tables/chairs remained in the lowest position.   []Patient remained in wheelchair.    []Provider requested patient in exam chair.  Patient required assist and use of transfer belt.  [x]Exam room door remained open unless with a provider.  []A bell provided to patient to notify staff if assistance was needed.  [x]Provider notified patient was identified as a fall risk.      Moriah Duran MA

## 2025-06-12 NOTE — PATIENT INSTRUCTIONS
Plan for fasting labs, ADRIAN and left leg arterial duplex ultrasound in May 2026 then ofice visit     Continue current medications , refilled xarelto

## 2025-06-30 DIAGNOSIS — N32.81 OAB (OVERACTIVE BLADDER): ICD-10-CM

## 2025-06-30 RX ORDER — DARIFENACIN 7.5 MG/1
7.5 TABLET, EXTENDED RELEASE ORAL DAILY
Qty: 90 TABLET | Refills: 1 | Status: SHIPPED | OUTPATIENT
Start: 2025-06-30

## 2025-07-07 ENCOUNTER — THERAPY VISIT (OUTPATIENT)
Dept: PHYSICAL THERAPY | Facility: CLINIC | Age: 75
End: 2025-07-07
Payer: COMMERCIAL

## 2025-07-07 DIAGNOSIS — Z85.831 HISTORY OF SARCOMA OF SOFT TISSUE: ICD-10-CM

## 2025-07-07 DIAGNOSIS — I89.0 LYMPHEDEMA OF LEFT UPPER EXTREMITY: Primary | ICD-10-CM

## 2025-07-07 DIAGNOSIS — Z17.0 MALIGNANT NEOPLASM OF UPPER-OUTER QUADRANT OF RIGHT BREAST IN FEMALE, ESTROGEN RECEPTOR POSITIVE (H): ICD-10-CM

## 2025-07-07 DIAGNOSIS — R53.81 PHYSICAL DECONDITIONING: ICD-10-CM

## 2025-07-07 DIAGNOSIS — L90.5 SCAR CONDITION AND FIBROSIS OF SKIN: ICD-10-CM

## 2025-07-07 DIAGNOSIS — C50.411 MALIGNANT NEOPLASM OF UPPER-OUTER QUADRANT OF RIGHT BREAST IN FEMALE, ESTROGEN RECEPTOR POSITIVE (H): ICD-10-CM

## 2025-07-07 DIAGNOSIS — R53.1 DECREASED STRENGTH: ICD-10-CM

## 2025-07-07 DIAGNOSIS — I89.0 LYMPHEDEMA OF LEFT LOWER EXTREMITY: ICD-10-CM

## 2025-07-07 DIAGNOSIS — M79.662 PAIN OF LEFT LOWER LEG: ICD-10-CM

## 2025-07-07 DIAGNOSIS — M25.60 DECREASED RANGE OF MOTION: ICD-10-CM

## 2025-07-07 PROCEDURE — 97535 SELF CARE MNGMENT TRAINING: CPT | Mod: GP | Performed by: PHYSICAL THERAPIST

## 2025-07-07 PROCEDURE — 97110 THERAPEUTIC EXERCISES: CPT | Mod: GP | Performed by: PHYSICAL THERAPIST

## 2025-07-07 PROCEDURE — 97140 MANUAL THERAPY 1/> REGIONS: CPT | Mod: GP | Performed by: PHYSICAL THERAPIST

## 2025-07-07 NOTE — PROGRESS NOTES
07/07/25 0500   Appointment Info   Signing clinician's name / credentials Mariana Guzman, JOAN, CLT-SALINA   Total/Authorized Visits [facility] MEDICARE [facility]AGE   Visits Used 8   Medical Diagnosis Malignant neoplasm of upper-outer quadrant of right breast in female, estrogen receptor positive (H) (C50.411, Z17.0)  - Primary    Lymphedema of left leg (I89.0)    Physical deconditioning (R53.81)   PT Tx Diagnosis lymphedema, fibrosis, pain, decreased strength, impaired ROM (L hip, Lknee, L ankle), decresed balance and aerobic fitness   Precautions/Limitations osteoporosis, s/p L GISSELL; s/p R breast cancer   Other pertinent information c/o increased fullness and tightness of L knee and weakness of L hip limiting functional mobilty and intermittent L medial knee likley nerve pain limiting restorative sleep and function;  may benefit from updated Flexitouch pump as >15 years old PMHX:12/14/2022 s/p R lumpectomy and SLND -2/2 with recommended 5years hormone blocker Anastrozole; h/o LLE peripheral artery disease s/p femoral-popliteal bypass along w/ stent to the bypass later, on DAPT, 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   Progress Note/Certification   Onset of illness/injury or Date of Surgery 03/25/25  (date of order: Leeann Sharma MD)   Therapy Frequency 1x/week x 6 weeks then decrease to 2x/month x 2 months   Predicted Duration 90 days   Certification date from 07/07/25   Certification date to 10/05/25   Progress Note Completed Date 04/10/25   PT Goal 1   Goal Identifier Home program   Goal Description Patient will be independent in home program to manage conditions of lymphedema and fibrosis. as well as decreased strength, rom and balance issues   Rationale to maximize safety and independence with  performance of ADLs and functional tasks;to maximize safety and independence within the home;to maximize safety and independence within the community;to maximize safety and independence with self cares   Target Date 10/05/25  (goal dates extended as awaiting new velcro lower leg wrap and updated Flexitouch pump)   PT Goal 2   Goal Identifier Edema / Volume   Goal Description Patient will demonstrate stable  volume of L  LE to decrease risk of infection.   Rationale to maximize safety and independence with performance of ADLs and functional tasks;to maximize safety and independence within the home;to maximize safety and independence within the community;to maximize safety and independence with transportation;to maximize safety and independence with self cares   Goal Progress eval: L lower leg >R = 31%; 7/7/2025 L>R = 44.5%%   Target Date 10/05/25   PT Goal 3   Goal Identifier LLIS   Goal Description Patient will demonstrate an 5 point decrease in LLIS to demonstrate a decreased impact of lymphedema on function.   Rationale to maximize safety and independence with performance of ADLs and functional tasks;to maximize safety and independence within the home;to maximize safety and independence within the community;to maximize safety and independence with transportation;to maximize safety and independence with self cares   Goal Progress evaL = 23   Target Date 10/05/25   Subjective Report   Subjective Report to dept stating continues to await updated pump   Objective Measure 1   Objective Measure Leg edema and fibrosis   Details Lipodermatosclerosis, Non-pitting, Pitting, mild errthyma of L anterior mid shin ; significant hyperplasia and rubbery fibrosis throughout L LE especially L calf and knee; 2+ pretibial pitting L LE; nonpitting puffy edema knee and thigh and L lower quadrant   Objective Measure 2   Objective Measure volume  / mid knee circumference in standing with full knee extension   Details to 70cm: L increased  7%; to 40cm increased 11.7% from last volume 5/23/2025 possibly due to hot weather   Objective Measure 3   Objective Measure knee ROM   Details 0-85 degrees demonstrating increased 3 degrees since eval; stable status   Objective Measure 4   Objective Measure LLIS   Objective Measure 5   Objective Measure Pain   Details reporting decreased P & N with adjusting how sitting on chair   Objective Measure 6   Objective Measure home program / garments   Details currently: full GCB L LE 1x/week (increased to 2x/week if noting awakening with increased edema), 2 10cm x 10m short stretch overtop Tribute 1x/week, and use of Tribute alone when not in GCB at night; daytime wear of class 3 custom thigh high, awaiting new velcro calf wrap to trial wearing overtop thigh high compression garment; may pursue ready made spanx like bike shorts, awaiting new Flexitouch compression pump and may increase frequency from 3x/week to 5-7 nights/week as able;  walking outside with SEC 3 days/week  20-40', daily stretching  L LE, and strengtheing L LE and core every other day   Therapeutic Procedure/Exercise   Therapeutic Procedures: strength, endurance, ROM, flexibility minutes (51465) 15   Ther Proc 1 stretching, strengthening, aerobic ex, / edema ex   Ther Proc 1 - Details pt with additional questions as has received other HEP for ortho based issues and educating pt in prioritizing stretching of GS and knee flexion daily and in continued benefit of performing strengthening of core and LEs every other day or at least 2x/week and educated in exs considered less challenging than current drills but working similar mms.  benefittng from additional vcs and demonstration to optmize technique. iinstructing in stabilizing of feet endge of footboard on bed during bridging drill to limt sliding of L foot and benefit of first engaging TA and roll spine up/then down to maintain core throughout drill. may consider standing at counter with forearms on  counter hip extension if continued challenge with bridge .   Skilled Intervention prioritizing of Home program   Patient Response/Progress verbalized understanding   Manual Therapy   Manual Therapy: Mobilization, MFR, MLD, friction massage minutes (04742) 35   Manual Therapy 1 volume, STM/MLD, GCB,   Manual Therapy 1 - Details arrived in GCB L LE since 5pm last evening; demonstrating good gradient comrpession but noting increaseed pooling edema of L LE upon removal of GCB possibly due to summer heat. and educating in options to change GCB by [id number] bandage malleoli to mid thigh and 5th bandage malleoli to groin for increased gradient compression of lower leg to limit edema refill.  volume assessed with details in  objective measures demosntrating increased volume especially of L lower leg possibly due to excessive heat but instructing pt in likley benefit of increasing home program during times of worsening edema.   Skilled Intervention assessment and modification of home program to optimize outcomes   Patient Response/Progress verbalized understanding, slight decrease in volume L LE   Self Care/home Management   ADL/Home Mgmt Training (47784) 10   Self Care 1 garments, compression pump, home program   Self Care 1 - Details instructing pt to trial use of velcro wrap to L thigh high garment lower leg during day only for added compression lower leg to limit edema refill once received. also instructing pt in benefit of increassing frequency of Flexitouch pump from 3x/week to 4-7x/week if noting worsening edema.  awaiting new compression pump as well.   Skilled Intervention assisting in assessment and modification of home program to optmize outcomes   Patient Response/Progress verbalized understanding   Education   Learner/Method Patient   Plan   Home program full GCB of L LE 1x/week (ok for 2x/week if indicated by am edema levels) and 2 10cm x 10m  layers short stretch over  Tribute 1x/week  as well as nightly  Tribute on other nights; and use of compression thigh high and may trial comrpession bike short other days and awaiting new velcro wrap to trial overtop thigh high garment; , self lymphatic draiange where bandage/garment ends nightly,  standing L GS stretch, seated L knee flexion stretch, seated / standing L hip flexion x 10, core strengthening (CS, bridging, SLR), standing HS curls, step ups   Updates to plan of care goals remain valid as awaiting new velcro compression wrap and new Flexitouch pump for Home proram   Plan for next session plan to see in 1-2 months to assess volume following updated GCB and addition of velcro wrap to thigh high compression stocking and updated Flexitouch pump to assure optimal management of aggressive lymphedema   Comments   Comments 5/8/2025 email to [facility] to assist in pursuing updated Flexitouch for improved home managment of chronic and aggressive lymphedema as despite consistent compression, elevation and exercise, pt contineus to demonstrate significant tissue hypertrophy especially of L lower leg, and discomfort   Total Session Time   Timed Code Treatment Minutes 60   Total Treatment Time (sum of timed and untimed services) 60         [facility]                                                                                   OUTPATIENT PHYSICAL THERAPY    PLAN OF TREATMENT FOR OUTPATIENT REHABILITATION   Patient's Last Name, First Name, Angeli Pond YOB: 1950   Provider's Name   [facility]   Medical Record No.  [id number]     Onset Date: 03/25/25 (date of order: Leenan Sharma MD)  Start of Care Date:       Medical Diagnosis:  Malignant neoplasm of upper-outer quadrant of right breast in female, estrogen receptor positive (H) (C50.411, Z17.0)  - Primary    Lymphedema of left leg (I89.0)    Physical deconditioning (R53.81)      PT Treatment Diagnosis:  lymphedema, fibrosis,  pain, decreased strength, impaired ROM (L hip, Lknee, L ankle), decresed balance and aerobic fitness Plan of Treatment  Frequency/Duration: 1x/week x 6 weeks then decrease to 2x/month x 2 months/ 90 days    Certification date from 07/07/25 to 10/05/25         See note for plan of treatment details and functional goals     Mariana Guzman, PT                         I CERTIFY THE NEED FOR THESE SERVICES FURNISHED UNDER        THIS PLAN OF TREATMENT AND WHILE UNDER MY CARE     (Physician attestation of this document indicates review and certification of the therapy plan).              Referring Provider:  Leeann Sharma    Initial Assessment  See Epic Evaluation-              PLAN  Continue therapy per current plan of care.    Beginning/End Dates of Progress Note Reporting Period:  04/10/25 to 07/07/2025    Referring Provider:  Leeann Sharma

## 2025-07-09 ENCOUNTER — OFFICE VISIT (OUTPATIENT)
Dept: UROLOGY | Facility: CLINIC | Age: 75
End: 2025-07-09
Payer: COMMERCIAL

## 2025-07-09 ENCOUNTER — OFFICE VISIT (OUTPATIENT)
Dept: INTERNAL MEDICINE | Facility: CLINIC | Age: 75
End: 2025-07-09
Payer: COMMERCIAL

## 2025-07-09 VITALS
DIASTOLIC BLOOD PRESSURE: 76 MMHG | WEIGHT: 157 LBS | HEIGHT: 66 IN | BODY MASS INDEX: 25.23 KG/M2 | SYSTOLIC BLOOD PRESSURE: 122 MMHG

## 2025-07-09 VITALS
HEART RATE: 82 BPM | HEIGHT: 66 IN | WEIGHT: 159 LBS | DIASTOLIC BLOOD PRESSURE: 58 MMHG | RESPIRATION RATE: 12 BRPM | SYSTOLIC BLOOD PRESSURE: 112 MMHG | BODY MASS INDEX: 25.55 KG/M2 | OXYGEN SATURATION: 96 % | TEMPERATURE: 97.8 F

## 2025-07-09 DIAGNOSIS — R10.2 PELVIC PRESSURE IN FEMALE: ICD-10-CM

## 2025-07-09 DIAGNOSIS — N32.81 OAB (OVERACTIVE BLADDER): Primary | ICD-10-CM

## 2025-07-09 DIAGNOSIS — R33.9 INCOMPLETE BLADDER EMPTYING: ICD-10-CM

## 2025-07-09 DIAGNOSIS — R10.2 PELVIC PAIN: Primary | ICD-10-CM

## 2025-07-09 DIAGNOSIS — R32 URINARY INCONTINENCE, UNSPECIFIED TYPE: ICD-10-CM

## 2025-07-09 DIAGNOSIS — R35.0 URINARY FREQUENCY: ICD-10-CM

## 2025-07-09 LAB — RESIDUAL VOLUME (RV) (EXTERNAL): 107

## 2025-07-09 PROCEDURE — 1126F AMNT PAIN NOTED NONE PRSNT: CPT | Performed by: PHYSICIAN ASSISTANT

## 2025-07-09 PROCEDURE — 99214 OFFICE O/P EST MOD 30 MIN: CPT | Performed by: INTERNAL MEDICINE

## 2025-07-09 PROCEDURE — 3078F DIAST BP <80 MM HG: CPT | Performed by: INTERNAL MEDICINE

## 2025-07-09 PROCEDURE — 3078F DIAST BP <80 MM HG: CPT | Performed by: PHYSICIAN ASSISTANT

## 2025-07-09 PROCEDURE — 3074F SYST BP LT 130 MM HG: CPT | Performed by: PHYSICIAN ASSISTANT

## 2025-07-09 PROCEDURE — 99214 OFFICE O/P EST MOD 30 MIN: CPT | Mod: 25 | Performed by: PHYSICIAN ASSISTANT

## 2025-07-09 PROCEDURE — 3074F SYST BP LT 130 MM HG: CPT | Performed by: INTERNAL MEDICINE

## 2025-07-09 PROCEDURE — 51798 US URINE CAPACITY MEASURE: CPT | Performed by: PHYSICIAN ASSISTANT

## 2025-07-09 RX ORDER — DARIFENACIN 15 MG/1
15 TABLET, EXTENDED RELEASE ORAL DAILY
Qty: 90 TABLET | Refills: 3 | Status: SHIPPED | OUTPATIENT
Start: 2025-07-09

## 2025-07-09 RX ORDER — DARIFENACIN 7.5 MG/1
7.5 TABLET, EXTENDED RELEASE ORAL DAILY
Qty: 90 TABLET | Refills: 1 | Status: SHIPPED | OUTPATIENT
Start: 2025-07-09 | End: 2025-07-09 | Stop reason: DRUGHIGH

## 2025-07-09 ASSESSMENT — PAIN SCALES - GENERAL: PAINLEVEL_OUTOF10: NO PAIN (0)

## 2025-07-09 NOTE — NURSING NOTE
Chief Complaint   Patient presents with    Follow Up     6 months with PVR      Pt states she has urgency and some leakage when shifting positions     PVR: 107 mL    Angelina Wagner, CMA

## 2025-07-09 NOTE — NURSING NOTE
"Chief Complaint   Patient presents with    Uterine Prolapse     Discuss possible symptoms of uterine prolapse     initial /58   Pulse 82   Temp 97.8  F (36.6  C) (Tympanic)   Resp 12   Ht 1.676 m (5' 6\")   Wt 72.1 kg (159 lb)   LMP  (LMP Unknown)   SpO2 96%   BMI 25.66 kg/m   Estimated body mass index is 25.66 kg/m  as calculated from the following:    Height as of this encounter: 1.676 m (5' 6\").    Weight as of this encounter: 72.1 kg (159 lb)..  bp completed using cuff size large  MELONIE MCCANN LPN  "

## 2025-07-09 NOTE — PROGRESS NOTES
Subjective      CHIEF COMPLAINT/REASON FOR VISIT   Patient of Dr. Oquendo's seen on my personal calendar  Follow up on OAB and incomplete emptying    HISTORY OF PRESENT ILLNESS   Ms. Jacobson is very pleasant 75 year old year old female, who presents today for follow-up regarding overactivity of the bladder.  I last saw her on 01/03/2025.  She is also previously followed with Dr. Hinton.  Patient had previously gone to pelvic floor physical therapy and noted minimal improvement in her symptomatology.    She is trialed multiple medications including Toviaz, oxybutynin, and Myrbetriq.  These do not seem to help her urinary symptomatology.  She also denied improvement with Detrol in 2017 and trospium in 2020 had side effects and did not seem to work.  She is most recently on Enablex 7.5 mg daily.  She does note that she is uncertain if it was helping.  She had increased this up to 15 mg and thought that maybe the 15 mg helped some.  She ran out of this and has been off of it since Saturday or Sunday.  She was doing relatively well on Vesicare 5 mg, but it placed her into urinary retention with a postvoid residual of 294 mL.  This caused us to recommend she discontinue this in October 2024.    PVR today is 107 mL.    Patient also noted with her primary care provider that she is having leakage now with shifting positions in addition to urgency and frequency.  Her primary recommended that she have a pelvic ultrasound, but wants urology to order this.  Patient has known prolapse.  She previously had a pessary and did poorly with it and does not want to do this again.    She has also had some recent urinary tract infections on 0 29/25, 3/21/2025, and 318/25.  All of these showed Proteus penneri.  The culture on 03/18/2025 also showed E. coli.  Her PCP placed her on postcoital Macrobid.    The following portions of the patient's history were reviewed and updated as appropriate: allergies, current medications, past family history,  past medical history, past social history, past surgical history, and problem list.     REVIEW OF SYSTEMS   Review of Systems   Constitutional: Negative.    Gastrointestinal:  Positive for constipation.   Genitourinary:  Positive for difficulty urinating, dysuria, frequency and urgency. Negative for hematuria.      Per HPI.     Patient Active Problem List   Diagnosis    MULTINODUL GOITER    Hearing loss    Hyperlipidemia LDL goal <70    Osteoporosis    Impaired fasting glucose    Lymphedema of left leg    Tubular adenoma    Vertigo    Myxoid liposarcoma (HCC)    PAD (peripheral artery disease)    Vascular graft occlusion    Femoral-popliteal bypass graft occlusion, left    History of sarcoma    S/P ORIF (open reduction internal fixation) fracture    Hip pain, left    Postural urinary incontinence    Personal history of urinary tract infection    OAB (overactive bladder)    Myalgia of pelvic floor    Lesion of bladder    S/P total hip arthroplasty    Acute pain of left knee    Closed fracture of left tibia and fibula, initial encounter    Closed displaced fracture of left patella, unspecified fracture morphology, initial encounter    Other specified injury of left quadriceps muscle, fascia and tendon, initial encounter    Elevated coronary artery calcium score=7/1/21     Arthrofibrosis of knee joint, left    Hypothyroidism, unspecified type    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 , 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 - s/p lumpectomy + radiation    Left knee pain    Long term current use of aromatase inhibitor    ALEXANDER on CPAP      Past Medical History:   Diagnosis Date    * * * SBE PROPHYLAXIS * * * 1998    Amox 500mg, take 4 tabs one hour prior to procedure.Takes this because of lymphedema secondary from leg surgey    Antiplatelet or antithrombotic long-term use     Arrhythmia     Arthritis     Central serous retinopathy 2001    Resolved 9/2001    CHRONIC NECK PAIN 1995    Complication of  "anesthesia     Depressive disorder     Depressive disorder, not elsewhere classified 2001    Elevated coronary artery calcium score=7/1/21 08/03/2021    Elevated coronary artery calcium score=7/1/21 08/03/2021    History of blood transfusion 04/2019 12/2020    during left hip pinning, during surgery for patella    Lateral epicondylitis     MIXED HYPERLIPIDEMIA, LDL GOAL <160 1998    LDL goal < 160    Motion sickness     MYXOID LIPOSARCOMA 2000    Left thigh, S/P excision, [facility]    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    CHRONIC LEFT THIGH LYMPHEDEMA    Nontoxic multinodular goiter 2005    needs yearly US    ALEXANDER (obstructive sleep apnea) 07/03/2024    Osteoporosis, unspecified 2001    Other chronic pain     fx ribs left     Other lymphedema 2000    left thigh, gets regular PT for this    Overactive bladder     PAD (peripheral artery disease) 04/20/2018    PONV (postoperative nausea and vomiting)     SHINGLES 2001    Sprain of lumbosacral (joint) (ligament) 1995    right    Unspecified hearing loss 1998    chronic tinnitus    Unspecified tinnitus 1998        Objective      PHYSICAL EXAM   /76   Ht 1.676 m (5' 6\")   Wt 71.2 kg (157 lb)   LMP  (LMP Unknown)   BMI 25.34 kg/m     Physical Exam  Constitutional:       Appearance: Normal appearance.   HENT:      Head: Normocephalic.      Nose: Nose normal.   Eyes:      General: No scleral icterus.  Pulmonary:      Effort: Pulmonary effort is normal.   Musculoskeletal:      Comments: Ambulates with a cane.   Skin:     Findings: No rash.   Neurological:      General: No focal deficit present.      Mental Status: She is alert and oriented to person, place, and time.   Psychiatric:         Mood and Affect: Mood normal.         Behavior: Behavior normal.       LABORATORY     Recent Labs   Lab Test 04/29/25  1609   COLOR Yellow   APPEARANCE Cloudy*   URINEGLC Negative   URINEBILI Negative   URINEKETONE Negative   SG 1.015   UBLD Trace*   URINEPH 7.0 "   PROTEIN Negative   UROBILINOGEN 0.2   NITRITE Negative   LEUKEST Large*   RBCU 0-2   WBCU *     TESTING    PVR: 107 mL    Assessment & Plan    1. OAB (overactive bladder)    2. Incomplete bladder emptying    3. Urinary incontinence, unspecified type    4. Pelvic pressure in female        I had the pleasure today of meeting with Ms. Jacobson to discuss her her difficulties with incomplete emptying as well as urgency, frequency, and urge incontinence.  She has now been having leakage with shifting positions in addition to her baseline urgency and frequency.  He has previously been to pelvic floor physical therapy and noted minimal improvement.  We also trialed just about every medication other than Gemtesa.  She does feel like the darifenacin may have helped her symptoms some, and she would like to continue with this for the time being.    I discussed with her that she has known prolapse, and treatment for this would be pelvic floor physical therapy, pessary, or consider surgical intervention.  She also could monitor this.  She has previously done pelvic floor physical therapy as well as pessary.  I discussed with her that next treatments for urgency, frequency, and overactivity of the bladder would include consideration of Botox or posterior tibial nerve stimulation.    At this point, we will plan on the following:    -Will get a transvaginal ultrasound to look for causes for pelvic pressure.    -Will plan on an appointment with Dr. Oquendo given persistent leakage issues and concern for prolapse.  Can discuss possible surgery or Botox.  She may also want you to have a formal urodynamics study.  You can also discuss if posterior tibial nerve stimulation is an option.    -Will have you do darifenacin ER 15 mg daily for now.  Refills sent to your pharmacy.    -The one medication that you have not tried is Gemtesa (vibegron) 75 mg once daily.  If you decide you want to try this, let me know.  The most common side  effect is headache.    Contact us in the interim with questions, concerns, or changes in symptomatology.      Signed by:       Nancy Chawla PA-C 7/9/2025 1:09 PM

## 2025-07-09 NOTE — LETTER
7/9/2025       RE: Angeli Jacobson  [id number]  [facility] [address]     Dear Colleague,    Thank you for referring your patient, Angeli Jacobson, to the [facility] [place] at [facility]. Please see a copy of my visit note below.    Subjective     CHIEF COMPLAINT/REASON FOR VISIT   Patient of Dr. Oquendo's seen on my personal calendar  Follow up on OAB and incomplete emptying    HISTORY OF PRESENT ILLNESS   Ms. Jacobson is very pleasant 75 year old year old female, who presents today for follow-up regarding overactivity of the bladder.  I last saw her on 01/03/2025.  She is also previously followed with Dr. Hinton.  Patient had previously gone to pelvic floor physical therapy and noted minimal improvement in her symptomatology.    She is trialed multiple medications including Toviaz, oxybutynin, and Myrbetriq.  These do not seem to help her urinary symptomatology.  She also denied improvement with Detrol in 2017 and trospium in 2020 had side effects and did not seem to work.  She is most recently on Enablex 7.5 mg daily.  She does note that she is uncertain if it was helping.  She had increased this up to 15 mg and thought that maybe the 15 mg helped some.  She ran out of this and has been off of it since Saturday or Sunday.  She was doing relatively well on Vesicare 5 mg, but it placed her into urinary retention with a postvoid residual of 294 mL.  This caused us to recommend she discontinue this in October 2024.    PVR today is 107 mL.    Patient also noted with her primary care provider that she is having leakage now with shifting positions in addition to urgency and frequency.  Her primary recommended that she have a pelvic ultrasound, but wants urology to order this.  Patient has known prolapse.  She previously had a pessary and did poorly with it and does not want to do this again.    She has also had some recent urinary  tract infections on 0 29/25, 3/21/2025, and 318/25.  All of these showed Proteus penneri.  The culture on 03/18/2025 also showed E. coli.  Her PCP placed her on postcoital Macrobid.    The following portions of the patient's history were reviewed and updated as appropriate: allergies, current medications, past family history, past medical history, past social history, past surgical history, and problem list.     REVIEW OF SYSTEMS   Review of Systems   Constitutional: Negative.    Gastrointestinal:  Positive for constipation.   Genitourinary:  Positive for difficulty urinating, dysuria, frequency and urgency. Negative for hematuria.      Per HPI.     Patient Active Problem List   Diagnosis     MULTINODUL GOITER     Hearing loss     Hyperlipidemia LDL goal <70     Osteoporosis     Impaired fasting glucose     Lymphedema of left leg     Tubular adenoma     Vertigo     Myxoid liposarcoma (HCC)     PAD (peripheral artery disease)     Vascular graft occlusion     Femoral-popliteal bypass graft occlusion, left     History of sarcoma     S/P ORIF (open reduction internal fixation) fracture     Hip pain, left     Postural urinary incontinence     Personal history of urinary tract infection     OAB (overactive bladder)     Myalgia of pelvic floor     Lesion of bladder     S/P total hip arthroplasty     Acute pain of left knee     Closed fracture of left tibia and fibula, initial encounter     Closed displaced fracture of left patella, unspecified fracture morphology, initial encounter     Other specified injury of left quadriceps muscle, fascia and tendon, initial encounter     Elevated coronary artery calcium score=7/1/21      Arthrofibrosis of knee joint, left     Hypothyroidism, unspecified type     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 , 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 - s/p lumpectomy + radiation     Left knee pain     Long term current use of aromatase inhibitor     ALEXANDER on CPAP      Past Medical  "History:   Diagnosis Date     * * * SBE PROPHYLAXIS * * * 1998    Amox 500mg, take 4 tabs one hour prior to procedure.Takes this because of lymphedema secondary from leg surgey     Antiplatelet or antithrombotic long-term use      Arrhythmia      Arthritis      Central serous retinopathy 2001    Resolved 9/2001     CHRONIC NECK PAIN 1995     Complication of anesthesia      Depressive disorder      Depressive disorder, not elsewhere classified 2001     Elevated coronary artery calcium score=7/1/21 08/03/2021     Elevated coronary artery calcium score=7/1/21 08/03/2021     History of blood transfusion 04/2019 12/2020    during left hip pinning, during surgery for patella     Lateral epicondylitis      MIXED HYPERLIPIDEMIA, LDL GOAL <160 1998    LDL goal < 160     Motion sickness      MYXOID LIPOSARCOMA 2000    Left thigh, S/P excision, radiation  at [facility]     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    CHRONIC LEFT THIGH LYMPHEDEMA     Nontoxic multinodular goiter 2005    needs yearly US     ALEXANDER (obstructive sleep apnea) 07/03/2024     Osteoporosis, unspecified 2001     Other chronic pain     fx ribs left      Other lymphedema 2000    left thigh, gets regular PT for this     Overactive bladder      PAD (peripheral artery disease) 04/20/2018     PONV (postoperative nausea and vomiting)      SHINGLES 2001     Sprain of lumbosacral (joint) (ligament) 1995    right     Unspecified hearing loss 1998    chronic tinnitus     Unspecified tinnitus 1998        Objective     PHYSICAL EXAM   /76   Ht 1.676 m (5' 6\")   Wt 71.2 kg (157 lb)   LMP  (LMP Unknown)   BMI 25.34 kg/m     Physical Exam  Constitutional:       Appearance: Normal appearance.   HENT:      Head: Normocephalic.      Nose: Nose normal.   Eyes:      General: No scleral icterus.  Pulmonary:      Effort: Pulmonary effort is normal.   Musculoskeletal:      Comments: Ambulates with a cane.   Skin:     Findings: No rash.   Neurological:      General: No focal " deficit present.      Mental Status: She is alert and oriented to person, place, and time.   Psychiatric:         Mood and Affect: Mood normal.         Behavior: Behavior normal.       LABORATORY     Recent Labs   Lab Test 04/29/25  1609   COLOR Yellow   APPEARANCE Cloudy*   URINEGLC Negative   URINEBILI Negative   URINEKETONE Negative   SG 1.015   UBLD Trace*   URINEPH 7.0   PROTEIN Negative   UROBILINOGEN 0.2   NITRITE Negative   LEUKEST Large*   RBCU 0-2   WBCU *     TESTING    PVR: 107 mL    Assessment & Plan   1. OAB (overactive bladder)    2. Incomplete bladder emptying    3. Urinary incontinence, unspecified type    4. Pelvic pressure in female        I had the pleasure today of meeting with Ms. Jacobson to discuss her her difficulties with incomplete emptying as well as urgency, frequency, and urge incontinence.  She has now been having leakage with shifting positions in addition to her baseline urgency and frequency.  He has previously been to pelvic floor physical therapy and noted minimal improvement.  We also trialed just about every medication other than Gemtesa.  She does feel like the darifenacin may have helped her symptoms some, and she would like to continue with this for the time being.    I discussed with her that she has known prolapse, and treatment for this would be pelvic floor physical therapy, pessary, or consider surgical intervention.  She also could monitor this.  She has previously done pelvic floor physical therapy as well as pessary.  I discussed with her that next treatments for urgency, frequency, and overactivity of the bladder would include consideration of Botox or posterior tibial nerve stimulation.    At this point, we will plan on the following:    -Will get a transvaginal ultrasound to look for causes for pelvic pressure.    -Will plan on an appointment with Dr. Oquendo given persistent leakage issues and concern for prolapse.  Can discuss possible surgery or Botox.  She may  also want you to have a formal urodynamics study.  You can also discuss if posterior tibial nerve stimulation is an option.    -Will have you do darifenacin ER 15 mg daily for now.  Refills sent to your pharmacy.    -The one medication that you have not tried is Gemtesa (vibegron) 75 mg once daily.  If you decide you want to try this, let me know.  The most common side effect is headache.    Contact us in the interim with questions, concerns, or changes in symptomatology.      Signed by:       Nancy Chawla PA-C 7/9/2025 1:09 PM      Again, thank you for allowing me to participate in the care of your patient.      Sincerely,    Nancy Chawla PA-C

## 2025-07-09 NOTE — PATIENT INSTRUCTIONS
Will get a transvaginal ultrasound to look for causes for pelvic pressure.    Will plan on an appointment with Dr. Oquendo given persistent leakage issues and concern for prolapse.  Can discuss possible surgery or Botox.  She may also want you to have a formal urodynamics study.  You can also discuss if posterior tibial nerve stimulation is an option.    Will have you do darifenacin ER 15 mg daily for now.  Refills sent to your pharmacy.    The one medication that you have not tried is Gemtesa (vibegron) 75 mg once daily.  If you decide you want to try this, let me know.  The most common side effect is headache.    Contact us in the interim with questions, concerns, or changes in symptomatology.  [phone]

## 2025-07-09 NOTE — PATIENT INSTRUCTIONS
Plan:  Discuss today with urology about the symptoms   2. Pelvic ultrasound  - for pelvic pain  It is the patient responsibility to check with insurance for coverage before you go for the test.   3. Referral to dr Cardenas -- urogynecology

## 2025-07-09 NOTE — PROGRESS NOTES
Dr Way's note      Patient's instructions / PLAN:                                                        Plan:  Discuss today with urology about the symptoms   2. Pelvic ultrasound  - for pelvic pain  It is the patient responsibility to check with insurance for coverage before you go for the test.   3. Referral to dr Cardenas -- urogynecology        ASSESSMENT & PLAN:                                                      (R10.2) Pelvic pain  (primary encounter diagnosis)  Comment:   Plan: US Pelvic Complete with Transvaginal            (R35.0) Urinary frequency  Comment:   Plan: US Pelvic Complete with Transvaginal, Adult         Urology  Referral                 Chief complaint:                                                      Pelvic discomfort    SUBJECTIVE:                                                    History of present illness:    Pelvic discomfort and urinary freq  -- urinary freq x 5 y. Eval by urol, Enablex prescribed   -- when she turns in bed or when she sits/stands she feels something is moving in the pelvis and she feels the need to urinate    Subjective   Angeli is a 75 year old, presenting for the following health issues:  Uterine Prolapse (Discuss possible symptoms of uterine prolapse)      7/9/2025     8:49 AM   Additional Questions   Roomed by Mary JIMENEZ     History of Present Illness       Reason for visit:  Talk about uterine prolapse symptoms  Symptoms include:  Frequent urination - especially when changing positions - getting up from chair or changing positions in bed. Constipation. Have seen tissue in the vagina.       air or bed or turning in bed.  Symptom intensity:  Moderate  Symptom progression:  Staying the same  Had these symptoms before:  Yes  What makes it worse:  No  What makes it better:  No   She is taking medications regularly.          Review of Systems:                                                      ROS: negative for fever, chills, cough, wheezes,  "chest pain, shortness of breath, vomiting, abdominal pain, leg swelling     OBJECTIVE:             Physical exam:  Blood pressure 112/58, pulse 82, temperature 97.8  F (36.6  C), temperature source Tympanic, resp. rate 12, height 1.676 m (5' 6\"), weight 72.1 kg (159 lb), SpO2 96%, not currently breastfeeding.     NAD, appears comfortable  Skin: no rashes   Chest: clear to auscultation bilaterally, good respiratory effort  Heart: S1 S2, RRR, no mgr appreciated  Abdomen: soft, not tender,  Extremities: no edema,   Neurologic: A, Ox3, no focal signs appreciated  Pelvic: I do not appreciate prolapse    PMHx: reviewed  Past Medical History:   Diagnosis Date    * * * SBE PROPHYLAXIS * * * 1998    Amox 500mg, take 4 tabs one hour prior to procedure.Takes this because of lymphedema secondary from leg surgey    Antiplatelet or antithrombotic long-term use     Arrhythmia     Arthritis     Central serous retinopathy 2001    Resolved 9/2001    CHRONIC NECK PAIN 1995    Complication of anesthesia     Depressive disorder     Depressive disorder, not elsewhere classified 2001    Elevated coronary artery calcium score=7/1/21 08/03/2021    Elevated coronary artery calcium score=7/1/21 08/03/2021    History of blood transfusion 04/2019 12/2020    during left hip pinning, during surgery for patella    Lateral epicondylitis     MIXED HYPERLIPIDEMIA, LDL GOAL <160 1998    LDL goal < 160    Motion sickness     MYXOID LIPOSARCOMA 2000    Left thigh, S/P excision, radiation  at [facility]    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    CHRONIC LEFT THIGH LYMPHEDEMA    Nontoxic multinodular goiter 2005    needs yearly US    ALEXANDER (obstructive sleep apnea) 07/03/2024    Osteoporosis, unspecified 2001    Other chronic pain     fx ribs left     Other lymphedema 2000    left thigh, gets regular PT for this    Overactive bladder     PAD (peripheral artery disease) 04/20/2018    PONV (postoperative nausea and vomiting)     SHINGLES 2001    Sprain of " lumbosacral (joint) (ligament) 1995    right    Unspecified hearing loss 1998    chronic tinnitus    Unspecified tinnitus 1998      PSHx: reviewed  Past Surgical History:   Procedure Laterality Date    ARTHROPLASTY HIP Left 10/04/2019    Procedure: Removal of left femoral hardware with a conversion to left total hip arthroplasty using a Biomet Annalisa femoral stem with an OsseoTi acetabular shell and a dual mobility bearing surface;  Surgeon: Spencer Celeste MD;  Location: [facility] OR    BIOPSY  04/2000    BIOPSY BREAST SEED LOCALIZATION Right 12/14/2022    Procedure: right breast tag localized lumpectomy;  Surgeon: Shelly Carr MD;  Location:  OR    BIOPSY NODE SENTINEL Right 12/14/2022    Procedure: with right sentinel lymph node biopsy;  Surgeon: Shelly Carr MD;  Location:  OR    BREAST SURGERY  December 2022    BYPASS GRAFT FEMOROPOPLITEAL Left 01/21/2019    Procedure: LEFT FEMORAL TO ABOVE KNEE POPLITEAL  BYPASS WITH POLYTETRAFLUOROETHYLENE GRAFT;  Surgeon: Shade Owens MD;  Location:  OR    COLONOSCOPY N/A 02/05/2016    Procedure: COMBINED COLONOSCOPY, SINGLE OR MULTIPLE BIOPSY/POLYPECTOMY BY BIOPSY;  Surgeon: Varun Stanley MD, MD;  Location:  GI    COLONOSCOPY N/A 12/10/2021    Procedure: COLONOSCOPY, WITH POLYPECTOMIES  USING COLD  SNARE,   CLIP APPLIED X2;  Surgeon: Dayton Luna MD;  Location:  GI    CYSTOSCOPY      EXCISION MALIG LESION>1.25CM  05/2000    Myxoid Liposarcoma      EXPLORE GROIN Right 05/01/2018    Procedure: EXPLORE GROIN;  EMERGENCY RIGHT FEMORAL EXPLORATION WITH FEMORAL ARTERY REPAIR.    EBL: 50mL;  Surgeon: Shade Owens MD;  Location:  OR    EYE SURGERY  Cataract removal 8- 2022    laser treatment for posterior capsular opacity   correct posterior capsulare    HC COLP CERVIX/UPPER VAGINA  07/1997    Negative    HC DILATION/CURETTAGE DIAG/THER NON OB  02/1997    Post menopausal bleeding on HRT, negative    HIP SURGERY Left 04/13/2019    IR  ANGIOGRAM THROUGH CATHETER FOLLOW UP  12/20/2018    IR ANGIOGRAM THROUGH CATHETER FOLLOW UP  12/21/2018    IR LOWER EXTREMITY ANGIOGRAM LEFT  12/19/2018    OPEN REDUCTION INTERNAL FIXATION PATELLA Left 12/21/2020    Procedure: Left partial patella fracture excision with advancement of the quadriceps tendon;  Surgeon: Stephen Rhodes MD;  Location: [facility] OR    OPEN REDUCTION INTERNAL FIXATION RODDING INTRAMEDULLARY TIBIA Left 12/21/2020    Procedure: Open reduction intramedullary nailing of left tibia fracture;  Surgeon: Stephen Rhodes MD;  Location: [facility] OR    REMOVE HARDWARE KNEE Left 05/31/2022    Procedure: Left knee patella hardware removal;  Surgeon: Spencer Celeste MD;  Location: [facility] OR    REPAIR TENDON QUADRICEPS Left 07/28/2021    Procedure: Left knee quadricepsplasty with intraoperative patellar fracture requiring open reduction and internal fixation of the patella;  Surgeon: Spencer Celeste MD;  Location: [facility] OR    REPAIR TENDON QUADRICEPS Left 05/31/2022    Procedure: Open left knee VY quadricepsplasty with hardware removal and allograft;  Surgeon: Spencer Celeste MD;  Location:  OR    VASCULAR SURGERY  04/30/2018    Left SFA stent in bypass graft    Z APPENDECTOMY      Appendectomy    ZC NONSPECIFIC PROCEDURE  04/2000    Open Biopsy Left Thigh Liposarcoma    ZZ COLONOSCOPY THRU STOMA, DIAGNOSTIC  2006    due 2010        Meds: reviewed  Current Outpatient Medications   Medication Sig Dispense Refill    calcium carbonate 600 mg-vitamin D 400 units (CALTRATE) 600-400 MG-UNIT per tablet Take 1 chew tab by mouth daily      clopidogrel (PLAVIX) 75 MG tablet Take 1 tablet (75 mg) by mouth daily. 90 tablet 3    COMIRNATY 30 MCG/0.3ML DAVIDA injection       darifenacin ER (ENABLEX) 7.5 MG 24 hr tablet Take 1 tablet (7.5 mg) by mouth daily. 90 tablet 1    denosumab (PROLIA) 60 MG/ML SOSY injection       evolocumab (REPATHA) 140 MG/ML prefilled autoinjector Inject 1 mL (140 mg)  subcutaneously every 14 days. 6 mL 3    ezetimibe (ZETIA) 10 MG tablet Take 1 tablet (10 mg) by mouth daily. 90 tablet 3    letrozole (FEMARA) 2.5 MG tablet Take 1 tablet (2.5 mg) by mouth daily. 90 tablet 3    levothyroxine (SYNTHROID/LEVOTHROID) 75 MCG tablet Take 1 tablet (75 mcg) by mouth daily. 90 tablet 3    Lutein-Zeaxanthin 25-5 MG CAPS Take 1 tablet by mouth daily.      magnesium oxide 200 MG TABS Take 200 mg by mouth daily.      multivitamin, therapeutic (THERA-VIT) TABS tablet Take 1 tablet by mouth daily      nitroFURantoin macrocrystal-monohydrate (MACROBID) 100 MG capsule TAKE 1 CAPSULE AFTER INTERCOURSE 30 capsule 0    psyllium (METAMUCIL/KONSYL) capsule Take 5 capsules by mouth daily.      rivaroxaban ANTICOAGULANT (XARELTO) 2.5 MG TABS tablet Take 1 tablet (2.5 mg) by mouth 2 times daily. 180 tablet 3    rosuvastatin (CRESTOR) 20 MG tablet Take 1 tablet (20 mg) by mouth daily. 90 tablet 3    Sharps Container MISC Use as per  instructions for safe needle disposal 1 each 1    Vitamin D3 (VITAMIN D, CHOLECALCIFEROL,) 25 mcg (1000 units) tablet Take 1 tablet by mouth daily.      FLUZONE HIGH-DOSE 0.5 ML injection          Soc Hx: reviewed  Fam Hx: reviewed      Chart documentation was completed, in part, with [employer] voice-recognition software. Even though reviewed, some grammatical, spelling, and word errors may remain.      Edel Way MD  Internal Medicine             Signed Electronically by: Edel Mccarty MD

## 2025-07-10 ENCOUNTER — PATIENT OUTREACH (OUTPATIENT)
Dept: CARE COORDINATION | Facility: CLINIC | Age: 75
End: 2025-07-10
Payer: COMMERCIAL

## 2025-07-16 ASSESSMENT — ENCOUNTER SYMPTOMS
DIFFICULTY URINATING: 1
CONSTIPATION: 1
FREQUENCY: 1
CONSTITUTIONAL NEGATIVE: 1
DYSURIA: 1
HEMATURIA: 0

## 2025-07-21 ENCOUNTER — MYC MEDICAL ADVICE (OUTPATIENT)
Dept: INTERNAL MEDICINE | Facility: CLINIC | Age: 75
End: 2025-07-21
Payer: COMMERCIAL

## 2025-07-22 ENCOUNTER — HOSPITAL ENCOUNTER (OUTPATIENT)
Dept: ULTRASOUND IMAGING | Facility: CLINIC | Age: 75
Discharge: HOME | End: 2025-07-22
Attending: INTERNAL MEDICINE
Payer: COMMERCIAL

## 2025-07-22 DIAGNOSIS — E21.3 HYPERPARATHYROIDISM: ICD-10-CM

## 2025-07-22 PROCEDURE — 76536 US EXAM OF HEAD AND NECK: CPT

## 2025-07-23 ENCOUNTER — LAB (OUTPATIENT)
Dept: LAB | Facility: CLINIC | Age: 75
End: 2025-07-23
Payer: COMMERCIAL

## 2025-07-23 DIAGNOSIS — I25.10 CORONARY ARTERY DISEASE INVOLVING NATIVE CORONARY ARTERY OF NATIVE HEART WITHOUT ANGINA PECTORIS: ICD-10-CM

## 2025-07-23 DIAGNOSIS — M80.00XD AGE-RELATED OSTEOPOROSIS WITH CURRENT PATHOLOGICAL FRACTURE WITH ROUTINE HEALING, SUBSEQUENT ENCOUNTER: ICD-10-CM

## 2025-07-23 DIAGNOSIS — E78.5 HYPERLIPIDEMIA LDL GOAL <70: ICD-10-CM

## 2025-07-23 LAB
ALBUMIN SERPL BCG-MCNC: 4.2 G/DL (ref 3.5–5.2)
ANION GAP SERPL CALCULATED.3IONS-SCNC: 8 MMOL/L (ref 7–15)
BUN SERPL-MCNC: 16.5 MG/DL (ref 8–23)
CALCIUM SERPL-MCNC: 9.7 MG/DL (ref 8.8–10.4)
CHLORIDE SERPL-SCNC: 105 MMOL/L (ref 98–107)
CREAT SERPL-MCNC: 0.65 MG/DL (ref 0.51–0.95)
EGFRCR SERPLBLD CKD-EPI 2021: >90 ML/MIN/1.73M2
GLUCOSE SERPL-MCNC: 90 MG/DL (ref 70–99)
HCO3 SERPL-SCNC: 26 MMOL/L (ref 22–29)
PHOSPHATE SERPL-MCNC: 2.7 MG/DL (ref 2.5–4.5)
POTASSIUM SERPL-SCNC: 4.2 MMOL/L (ref 3.4–5.3)
PTH-INTACT SERPL-MCNC: 112 PG/ML (ref 15–65)
SODIUM SERPL-SCNC: 139 MMOL/L (ref 135–145)
VIT D+METAB SERPL-MCNC: 43 NG/ML (ref 20–50)

## 2025-07-23 PROCEDURE — 83970 ASSAY OF PARATHORMONE: CPT

## 2025-07-23 PROCEDURE — 82570 ASSAY OF URINE CREATININE: CPT

## 2025-07-23 PROCEDURE — 82306 VITAMIN D 25 HYDROXY: CPT | Mod: GA

## 2025-07-23 PROCEDURE — 82340 ASSAY OF CALCIUM IN URINE: CPT

## 2025-07-23 PROCEDURE — 80069 RENAL FUNCTION PANEL: CPT

## 2025-07-23 PROCEDURE — 81050 URINALYSIS VOLUME MEASURE: CPT

## 2025-07-23 PROCEDURE — 36415 COLL VENOUS BLD VENIPUNCTURE: CPT

## 2025-07-23 RX ORDER — CONTAINER,EMPTY
EACH MISCELLANEOUS
Qty: 1 EACH | Refills: 1 | Status: SHIPPED | OUTPATIENT
Start: 2025-07-23

## 2025-07-24 LAB
CALCIUM 24H UR-MRATE: 0.18 G/SPEC (ref 0.1–0.3)
CALCIUM UR-MCNC: 9.3 MG/DL
COLLECT DURATION TIME UR: 24 H
COLLECT DURATION TIME UR: 24 H
CREAT 24H UR-MRATE: 0.81 G/SPEC (ref 0.72–1.51)
CREAT UR-MCNC: 41.5 MG/DL
SPECIMEN VOL UR: 1950 ML
SPECIMEN VOL UR: 1950 ML

## 2025-07-24 NOTE — PROGRESS NOTES
[facility]    Hematology/Oncology Established Patient Note      Today's Date: 7/30/2025    Reason for follow-up:  Right breast cancer.    HISTORY OF PRESENT ILLNESS: Angeli Jacobson is a 75 year old female with history of left thigh liposarcoma status post excision in 2000 with subsequent left leg lymphedema and peripheral arterial disease status post redo left common femoral to popliteal artery bypass in January 2019, who presents with the following oncologic history:  1.  11/7/2022: Screening mammogram showed asymmetry in the right breast at 12:00, retroareolar far posterior.  Left breast negative.  2.  11/15/2022: Diagnostic right mammogram showed asymmetry in the posterior right breast, 8 cm from nipple.  Ultrasound of right breast at 12:00, 2 cm from nipple showed a small benign cyst but no definite sonographic correlate for the mammographic asymmetry.  3.  11/16/2022: Stereotactic guided right breast needle biopsy of 8 mm lesion at 3:00, 8 cm from the nipple showed grade 2 invasive ductal carcinoma, ER positive at %, CT positive at 60-70%, HER2 IHC = 2+ equivocal, HER2/compa FISH negative.  4. 12/14/2022: Underwent right breast lumpectomy with right axillary sentinel lymph node excision with Dr. Shelly Carr.  Pathology showed no residual invasive malignancy; 2 microscopic foci of DCIS, grade 2, largest measuring 1.2 mm; margins negative; total 2 right axillary lymph nodes negative.  5. 3/21/2023: Completed adjuvant radiation therapy to the right breast.   6. 4/10/2023: Started anastrozole. Discontinued 6/19/2023 due to weakness, joint stiffness, and brain fog.  7. 8/20/2023: Switched to letrozole.  8. 12/06/2024: Held letrozole due to left trigger finger.  Had prior right trigger finger. Then resumed.  9. 5/31/2025: Stopped letrozole.    INTERVAL HISTORY:  Angeli reports no new pain, is feeling well. She discontinued letrozole 2 months ago.      REVIEW OF SYSTEMS:   14 point  ROS was reviewed and is negative other than as noted above in HPI.       HOME MEDICATIONS:  Current Outpatient Medications   Medication Sig Dispense Refill    calcium carbonate 600 mg-vitamin D 400 units (CALTRATE) 600-400 MG-UNIT per tablet Take 1 chew tab by mouth daily      clopidogrel (PLAVIX) 75 MG tablet Take 1 tablet (75 mg) by mouth daily. 90 tablet 3    COMIRNATY 30 MCG/0.3ML DAVIDA injection       darifenacin ER (ENABLEX) 15 MG 24 hr tablet Take 1 tablet (15 mg) by mouth daily. 90 tablet 3    evolocumab (REPATHA) 140 MG/ML prefilled autoinjector Inject 1 mL (140 mg) subcutaneously every 14 days. 6 mL 3    ezetimibe (ZETIA) 10 MG tablet Take 1 tablet (10 mg) by mouth daily. 90 tablet 3    FLUZONE HIGH-DOSE 0.5 ML injection       levothyroxine (SYNTHROID/LEVOTHROID) 75 MCG tablet Take 1 tablet (75 mcg) by mouth daily. 90 tablet 3    Lutein-Zeaxanthin 25-5 MG CAPS Take 1 tablet by mouth daily.      magnesium oxide 200 MG TABS Take 200 mg by mouth daily.      multivitamin, therapeutic (THERA-VIT) TABS tablet Take 1 tablet by mouth daily      nitroFURantoin macrocrystal-monohydrate (MACROBID) 100 MG capsule TAKE 1 CAPSULE AFTER INTERCOURSE 30 capsule 0    psyllium (METAMUCIL/KONSYL) capsule Take 5 capsules by mouth daily.      rivaroxaban ANTICOAGULANT (XARELTO) 2.5 MG TABS tablet Take 1 tablet (2.5 mg) by mouth 2 times daily. 180 tablet 3    rosuvastatin (CRESTOR) 20 MG tablet Take 1 tablet (20 mg) by mouth daily. 90 tablet 3    Sharps Container MISC Use as per  instructions for safe needle disposal 1 each 1    Vitamin D3 (VITAMIN D, CHOLECALCIFEROL,) 25 mcg (1000 units) tablet Take 1 tablet by mouth daily.           ALLERGIES:  Allergies   Allergen Reactions    Celexa [Citalopram Hydrobromide]      Decreased libido         PAST MEDICAL HISTORY:  Past Medical History:   Diagnosis Date    * * * SBE PROPHYLAXIS * * * 1998    Amox 500mg, take 4 tabs one hour prior to procedure.Takes this because of  lymphedema secondary from leg surgey    Antiplatelet or antithrombotic long-term use     Arrhythmia     Arthritis     Central serous retinopathy 2001    Resolved 2001    CHRONIC NECK PAIN     Complication of anesthesia     Depressive disorder     Depressive disorder, not elsewhere classified     Elevated coronary artery calcium score=2021    Elevated coronary artery calcium score=2021    History of blood transfusion 2019    during left hip pinning, during surgery for patella    Lateral epicondylitis     MIXED HYPERLIPIDEMIA, LDL GOAL <160     LDL goal < 160    Motion sickness     MYXOID LIPOSARCOMA 2000    Left thigh, S/P excision, radiation  at [facility]    Myxoid liposarcoma (HCC) 2004    CHRONIC LEFT THIGH LYMPHEDEMA    Nontoxic multinodular goiter 2005    needs yearly US    ALEXANDER (obstructive sleep apnea) 2024    Osteoporosis, unspecified     Other chronic pain     fx ribs left     Other lymphedema 2000    left thigh, gets regular PT for this    Overactive bladder     PAD (peripheral artery disease) 2018    PONV (postoperative nausea and vomiting)     SHINGLES     Sprain of lumbosacral (joint) (ligament)     right    Unspecified hearing loss     chronic tinnitus    Unspecified tinnitus      Gynecologic history:  Age of menarche at 11, , age of first pregnancy at 29, 5 years of OCP use, no HRT or fertility treatment.    PAST SURGICAL HISTORY:  Past Surgical History:   Procedure Laterality Date    ARTHROPLASTY HIP Left 10/04/2019    Procedure: Removal of left femoral hardware with a conversion to left total hip arthroplasty using a Biomet Annalisa femoral stem with an OsseoTi acetabular shell and a dual mobility bearing surface;  Surgeon: Spencer Celeste MD;  Location: [facility] OR    BIOPSY  2000    BIOPSY BREAST SEED LOCALIZATION Right 2022    Procedure: right breast tag localized lumpectomy;  Surgeon:  Shelly Carr MD;  Location:  OR    BIOPSY NODE SENTINEL Right 12/14/2022    Procedure: with right sentinel lymph node biopsy;  Surgeon: Shelly Carr MD;  Location:  OR    BREAST SURGERY  December 2022    BYPASS GRAFT FEMOROPOPLITEAL Left 01/21/2019    Procedure: LEFT FEMORAL TO ABOVE KNEE POPLITEAL  BYPASS WITH POLYTETRAFLUOROETHYLENE GRAFT;  Surgeon: Shade Owens MD;  Location:  OR    COLONOSCOPY N/A 02/05/2016    Procedure: COMBINED COLONOSCOPY, SINGLE OR MULTIPLE BIOPSY/POLYPECTOMY BY BIOPSY;  Surgeon: Varun Stanley MD, MD;  Location:  GI    COLONOSCOPY N/A 12/10/2021    Procedure: COLONOSCOPY, WITH POLYPECTOMIES  USING COLD  SNARE,   CLIP APPLIED X2;  Surgeon: Dayton Luna MD;  Location:  GI    CYSTOSCOPY      EXCISION MALIG LESION>1.25CM  05/2000    Myxoid Liposarcoma      EXPLORE GROIN Right 05/01/2018    Procedure: EXPLORE GROIN;  EMERGENCY RIGHT FEMORAL EXPLORATION WITH FEMORAL ARTERY REPAIR.    EBL: 50mL;  Surgeon: Shade Owens MD;  Location:  OR    EYE SURGERY  Cataract removal 8- 2022    laser treatment for posterior capsular opacity   correct posterior capsulare    HC COLP CERVIX/UPPER VAGINA  07/1997    Negative    HC DILATION/CURETTAGE DIAG/THER NON OB  02/1997    Post menopausal bleeding on HRT, negative    HIP SURGERY Left 04/13/2019    IR ANGIOGRAM THROUGH CATHETER FOLLOW UP  12/20/2018    IR ANGIOGRAM THROUGH CATHETER FOLLOW UP  12/21/2018    IR LOWER EXTREMITY ANGIOGRAM LEFT  12/19/2018    OPEN REDUCTION INTERNAL FIXATION PATELLA Left 12/21/2020    Procedure: Left partial patella fracture excision with advancement of the quadriceps tendon;  Surgeon: Stephen Rhodes MD;  Location:  OR    OPEN REDUCTION INTERNAL FIXATION RODDING INTRAMEDULLARY TIBIA Left 12/21/2020    Procedure: Open reduction intramedullary nailing of left tibia fracture;  Surgeon: Stephen Rhodes MD;  Location:  OR    REMOVE HARDWARE KNEE Left 05/31/2022    Procedure: Left knee  patella hardware removal;  Surgeon: Spencer Celeste MD;  Location: [facility] OR    REPAIR TENDON QUADRICEPS Left 07/28/2021    Procedure: Left knee quadricepsplasty with intraoperative patellar fracture requiring open reduction and internal fixation of the patella;  Surgeon: Spencer Celeste MD;  Location: [facility] OR    REPAIR TENDON QUADRICEPS Left 05/31/2022    Procedure: Open left knee VY quadricepsplasty with hardware removal and allograft;  Surgeon: Spencer Celeste MD;  Location: [facility] OR    VASCULAR SURGERY  04/30/2018    Left SFA stent in bypass graft    Z APPENDECTOMY      Appendectomy    [facility] NONSPECIFIC PROCEDURE  04/2000    Open Biopsy Left Thigh Liposarcoma    [facility]  [facility]    due 2010         SOCIAL HISTORY:  Social History     Socioeconomic History    Marital status:      Spouse name: Chris    Number of children: 3    Years of education: 14    Highest education level: Associate degree: academic program   Occupational History    Occupation: at home     Employer: NONE    Tobacco Use    Smoking status: Never     Passive exposure: Never    Smokeless tobacco: Never   Vaping Use    Vaping status: Never Used   Substance and Sexual Activity    Alcohol use: Never    Drug use: Never    Sexual activity: Yes     Partners: Male     Birth control/protection: Male Surgical     Comment:  had a vasectomy   Other Topics Concern    Parent/sibling w/ CABG, MI or angioplasty before 65F 55M? No   Social History Narrative    Not on file     Social Drivers of Health     Financial Resource Strain: Low Risk  (7/19/2024)    Financial Resource Strain     Within the past 12 months, have you or your family members you live with been unable to get utilities (heat, electricity) when it was really needed?: No   Food Insecurity: Low Risk  (7/19/2024)    Food Insecurity     Within the past 12 months, did you worry that your food would run out before you got money to buy more?:  No     Within the past 12 months, did the food you bought just not last and you didn t have money to get more?: No   Transportation Needs: Low Risk  (7/19/2024)    Transportation Needs     Within the past 12 months, has lack of transportation kept you from medical appointments, getting your medicines, non-medical meetings or appointments, work, or from getting things that you need?: No   Physical Activity: Inactive (7/19/2024)    Exercise Vital Sign     Days of Exercise per Week: 0 days     Minutes of Exercise per Session: 0 min   Stress: No Stress Concern Present (7/19/2024)    [employer] [employer] [employer] - Occupational Stress Questionnaire     Feeling of Stress : Only a little   Social Connections: Unknown (7/19/2024)    Social Connection and Isolation Panel [NHANES]     Frequency of Communication with Friends and Family: Not on file     Frequency of Social Gatherings with Friends and Family: Once a week     Attends Alevism Services: Not on file     Active Member of Clubs or Organizations: Not on file     Attends Club or Organization Meetings: Not on file     Marital Status: Not on file   Interpersonal Safety: Low Risk  (7/9/2025)    Interpersonal Safety     Do you feel physically and emotionally safe where you currently live?: Yes     Within the past 12 months, have you been hit, slapped, kicked or otherwise physically hurt by someone?: No     Within the past 12 months, have you been humiliated or emotionally abused in other ways by your partner or ex-partner?: No   Housing Stability: Low Risk  (7/19/2024)    Housing Stability     Do you have housing? : Yes     Are you worried about losing your housing?: No         FAMILY HISTORY:  Family History   Problem Relation Age of Onset    C.A.D. Father         MI 57    Alcohol/Drug Father         etoh    Coronary Artery Disease Father     Early Death Father         Heart Attack age 57    Obesity Mother     Osteoporosis Mother     Hyperlipidemia Son      Anxiety Disorder Son     Hyperlipidemia Son         very high, experimental drug    C.A.D. Paternal Grandmother         ascvd    Diabetes Maternal Grandmother     Cancer Maternal Grandmother     C.A.D. Paternal Uncle         Mi  age 48    Cancer Maternal Aunt         pancreatic CA    Hyperlipidemia Son     Hyperlipidemia Cousin     Other Cancer Other     Anxiety Disorder Son     Hyperlipidemia Cousin     Coronary Artery Disease Cousin     Hyperlipidemia Son     Anxiety Disorder Son     Hyperlipidemia Son     Heart Disease Son     Hyperlipidemia Other     Hyperlipidemia Other     Coronary Artery Disease Cousin     Anxiety Disorder Son     Other Cancer Other     Colon Cancer No family hx of      Brother with colon cancer.  Negative for breast, ovarian or prostate cancer.    PHYSICAL EXAM:  Vital signs:  /76   Pulse 72   Temp 97.7  F (36.5  C) (Oral)   Resp 14   Wt 73.1 kg (161 lb 3.2 oz)   LMP  (LMP Unknown)   SpO2 96%   BMI 26.42 kg/m     GENERAL/CONSTITUTIONAL: No acute distress.  EYES: No erythema or scleral icterus.  LYMPH: No cervical, supraclavicular, axillary adenopathy.   BREAST: No palpable masses in either breast. Nipples are everted bilaterally with no discharge. No erythema, ulceration, or dimpling of the skin.  RESPIRATORY: No audible cough or wheezing.   GASTROINTESTINAL: No hepatosplenomegaly, masses, or tenderness. No guarding.  No distention.  MUSCULOSKELETAL: Warm and well-perfused, no cyanosis, clubbing; chronic left lower extremity edema in compression garment.  NEUROLOGIC: No focal motor deficits. Alert, oriented, answers questions appropriately.  INTEGUMENTARY: No rashes or jaundice.  GAIT: Steady, does not use assistive device    LABS:  CBC RESULTS:   Recent Labs   Lab Test 25  0842   WBC 5.1   RBC 4.77   HGB 14.2   HCT 43.3   MCV 91   MCH 29.8   MCHC 32.8   RDW 13.0         Last Comprehensive Metabolic Panel:  Sodium   Date Value Ref Range Status   2025  139 135 - 145 mmol/L Final   05/17/2021 142 133 - 144 mmol/L Final     Potassium   Date Value Ref Range Status   07/23/2025 4.2 3.4 - 5.3 mmol/L Final   05/26/2022 3.9 3.4 - 5.3 mmol/L Final   05/17/2021 4.3 3.4 - 5.3 mmol/L Final     Chloride   Date Value Ref Range Status   07/23/2025 105 98 - 107 mmol/L Final   05/26/2022 106 94 - 109 mmol/L Final   05/17/2021 107 94 - 109 mmol/L Final     Carbon Dioxide   Date Value Ref Range Status   05/17/2021 32 20 - 32 mmol/L Final     Carbon Dioxide (CO2)   Date Value Ref Range Status   07/23/2025 26 22 - 29 mmol/L Final   05/26/2022 29 20 - 32 mmol/L Final     Anion Gap   Date Value Ref Range Status   07/23/2025 8 7 - 15 mmol/L Final   05/26/2022 4 3 - 14 mmol/L Final   05/17/2021 3 3 - 14 mmol/L Final     Glucose   Date Value Ref Range Status   07/23/2025 90 70 - 99 mg/dL Final   06/01/2022 137 (H) 70 - 99 mg/dL Final   05/17/2021 78 70 - 99 mg/dL Final     Comment:     Fasting specimen     Urea Nitrogen   Date Value Ref Range Status   07/23/2025 16.5 8.0 - 23.0 mg/dL Final   05/26/2022 17 7 - 30 mg/dL Final   05/17/2021 13 7 - 30 mg/dL Final     Creatinine   Date Value Ref Range Status   07/23/2025 0.65 0.51 - 0.95 mg/dL Final   05/17/2021 0.75 0.52 - 1.04 mg/dL Final     GFR Estimate   Date Value Ref Range Status   07/23/2025 >90 >60 mL/min/1.73m2 Final     Comment:     eGFR calculated using 2021 CKD-EPI equation.   05/17/2021 81 >60 mL/min/[1.73_m2] Final     Comment:     Non  GFR Calc  Starting 12/18/2018, serum creatinine based estimated GFR (eGFR) will be   calculated using the Chronic Kidney Disease Epidemiology Collaboration   (CKD-EPI) equation.       Calcium   Date Value Ref Range Status   07/23/2025 9.7 8.8 - 10.4 mg/dL Final   05/17/2021 9.3 8.5 - 10.1 mg/dL Final     Bilirubin Total   Date Value Ref Range Status   05/29/2025 0.3 <=1.2 mg/dL Final   05/17/2021 0.4 0.2 - 1.3 mg/dL Final     Alkaline Phosphatase   Date Value Ref Range Status    05/29/2025 69 40 - 150 U/L Final   05/17/2021 88 40 - 150 U/L Final     ALT   Date Value Ref Range Status   05/29/2025 30 0 - 50 U/L Final   05/17/2021 26 0 - 50 U/L Final     AST   Date Value Ref Range Status   05/29/2025 33 0 - 45 U/L Final   05/17/2021 20 0 - 45 U/L Final       PATHOLOGY:  None new.    IMAGING:  Reviewed as per A/P.    ASSESSMENT/PLAN:  Angeli Jacobson is a 75 year old female with the following issues:  1. Pathologic prognostic stage IA, [id number], grade 2 invasive ductal carcinoma of the right upper outer breast, ER positive (%), OR positive (70%), HER2/compa FISH negative  2. Polyarthralgia  -Angeli is s/p right lumpectomy and radiation completed 3/21/2023.    --She has no clinical evidence for recurrent breast cancer by physical exam.  --I had advised a total of 5 years of hormone blockade therapy.  --Due to increase in mental and physical fatigue, she switched from anastrozole to letrozole on 8/30/2023 but held on 12/06/2024 due to recurrent trigger finger and polyarthralgia. Then resumed but ultimately discontinued on 5/31/2025 due to fatigue, body and bone aches.  --She declines to try alternate hormone blockade therapies.  --Due for next annual bilateral mammogram for 11/2025. Will arrange.    3.  History of myxoid liposarcoma of left thigh  - Status post excision and radiation completed in 2000.  She did not receive chemotherapy for her liposarcoma.  This has resulted in left lower extremity lymphedema.  - Continue lymphedema therapy and compression stockings.    4.  Peripheral arterial disease  -Has history of left lower extremity peripheral arterial disease and is status post femoral-popliteal bypass with later stent to bypass.  She is on aspirin and Plavix.    5. Osteoporosis  --History of taking alendronate, off for past year.  - DEXA scan from 12/22/2022 and 12/27/2023 showed osteoporosis. Machines were not the same and therefore results not comparable.  -- 12/18/2024  DEXA showed osteopenia with 1.8% increase in lumbar spine BMD and 1.4% increase in the right hip BMD.  - Continue adequate calcium and vitamin D.  --She has a follow-up with endocrinology.  She will be switching from Prolia to Reclast.    Return in 6 months.    Emilia Angulo MD  [facility] Hematology/Oncology     Total time spent today: 30 minutes in chart review, patient evaluation, counseling, documentation, test and/or medication/prescription orders, and coordination of care.     The longitudinal plan of care for the diagnosis(es)/condition(s) as documented were addressed during this visit. Due to the added complexity in care, I will continue to support Angeli in the subsequent management and with ongoing continuity of care.

## 2025-07-28 ENCOUNTER — HOSPITAL ENCOUNTER (OUTPATIENT)
Dept: ULTRASOUND IMAGING | Facility: CLINIC | Age: 75
Discharge: HOME | End: 2025-07-28
Attending: PHYSICIAN ASSISTANT | Admitting: PHYSICIAN ASSISTANT
Payer: COMMERCIAL

## 2025-07-28 DIAGNOSIS — R10.2 PELVIC PRESSURE IN FEMALE: ICD-10-CM

## 2025-07-28 DIAGNOSIS — N32.81 OAB (OVERACTIVE BLADDER): ICD-10-CM

## 2025-07-28 DIAGNOSIS — R32 URINARY INCONTINENCE, UNSPECIFIED TYPE: ICD-10-CM

## 2025-07-28 DIAGNOSIS — R33.9 INCOMPLETE BLADDER EMPTYING: ICD-10-CM

## 2025-07-28 PROCEDURE — 76856 US EXAM PELVIC COMPLETE: CPT

## 2025-07-29 ENCOUNTER — OFFICE VISIT (OUTPATIENT)
Dept: ENDOCRINOLOGY | Facility: CLINIC | Age: 75
End: 2025-07-29
Payer: COMMERCIAL

## 2025-07-29 VITALS
SYSTOLIC BLOOD PRESSURE: 120 MMHG | BODY MASS INDEX: 26.29 KG/M2 | WEIGHT: 163.6 LBS | HEIGHT: 66 IN | DIASTOLIC BLOOD PRESSURE: 75 MMHG | OXYGEN SATURATION: 96 % | HEART RATE: 69 BPM

## 2025-07-29 DIAGNOSIS — Z79.811 LONG TERM CURRENT USE OF AROMATASE INHIBITOR: ICD-10-CM

## 2025-07-29 DIAGNOSIS — M80.00XD AGE-RELATED OSTEOPOROSIS WITH CURRENT PATHOLOGICAL FRACTURE WITH ROUTINE HEALING, SUBSEQUENT ENCOUNTER: Primary | ICD-10-CM

## 2025-07-29 DIAGNOSIS — Z17.0 MALIGNANT NEOPLASM OF UPPER-OUTER QUADRANT OF RIGHT BREAST IN FEMALE, ESTROGEN RECEPTOR POSITIVE (H): ICD-10-CM

## 2025-07-29 DIAGNOSIS — M80.00XA OSTEOPOROSIS WITH CURRENT PATHOLOGICAL FRACTURE, UNSPECIFIED OSTEOPOROSIS TYPE, INITIAL ENCOUNTER: ICD-10-CM

## 2025-07-29 DIAGNOSIS — C50.411 MALIGNANT NEOPLASM OF UPPER-OUTER QUADRANT OF RIGHT BREAST IN FEMALE, ESTROGEN RECEPTOR POSITIVE (H): ICD-10-CM

## 2025-07-29 PROCEDURE — 3074F SYST BP LT 130 MM HG: CPT | Performed by: INTERNAL MEDICINE

## 2025-07-29 PROCEDURE — 99215 OFFICE O/P EST HI 40 MIN: CPT | Performed by: INTERNAL MEDICINE

## 2025-07-29 PROCEDURE — G2211 COMPLEX E/M VISIT ADD ON: HCPCS | Performed by: INTERNAL MEDICINE

## 2025-07-29 PROCEDURE — 3078F DIAST BP <80 MM HG: CPT | Performed by: INTERNAL MEDICINE

## 2025-07-29 RX ORDER — DIPHENHYDRAMINE HYDROCHLORIDE 50 MG/ML
25 INJECTION, SOLUTION INTRAMUSCULAR; INTRAVENOUS
Start: 2025-08-05

## 2025-07-29 RX ORDER — ALBUTEROL SULFATE 90 UG/1
1-2 INHALANT RESPIRATORY (INHALATION)
Start: 2025-08-05

## 2025-07-29 RX ORDER — MEPERIDINE HYDROCHLORIDE 25 MG/ML
25 INJECTION INTRAMUSCULAR; INTRAVENOUS; SUBCUTANEOUS
OUTPATIENT
Start: 2025-08-05

## 2025-07-29 RX ORDER — DIPHENHYDRAMINE HYDROCHLORIDE 50 MG/ML
50 INJECTION, SOLUTION INTRAMUSCULAR; INTRAVENOUS
Start: 2025-08-05

## 2025-07-29 RX ORDER — ZOLEDRONIC ACID 0.05 MG/ML
5 INJECTION, SOLUTION INTRAVENOUS ONCE
Start: 2025-08-05

## 2025-07-29 RX ORDER — HEPARIN SODIUM,PORCINE 10 UNIT/ML
5-20 VIAL (ML) INTRAVENOUS DAILY PRN
OUTPATIENT
Start: 2025-08-05

## 2025-07-29 RX ORDER — EPINEPHRINE 1 MG/ML
0.3 INJECTION, SOLUTION, CONCENTRATE INTRAVENOUS EVERY 5 MIN PRN
OUTPATIENT
Start: 2025-08-05

## 2025-07-29 RX ORDER — ALBUTEROL SULFATE 0.83 MG/ML
2.5 SOLUTION RESPIRATORY (INHALATION)
OUTPATIENT
Start: 2025-08-05

## 2025-07-29 RX ORDER — ACETAMINOPHEN 325 MG/1
650 TABLET ORAL
OUTPATIENT
Start: 2025-08-05

## 2025-07-29 RX ORDER — METHYLPREDNISOLONE SODIUM SUCCINATE 40 MG/ML
40 INJECTION INTRAMUSCULAR; INTRAVENOUS
Start: 2025-08-05

## 2025-07-29 RX ORDER — HEPARIN SODIUM (PORCINE) LOCK FLUSH IV SOLN 100 UNIT/ML 100 UNIT/ML
5 SOLUTION INTRAVENOUS
OUTPATIENT
Start: 2025-08-05

## 2025-07-29 NOTE — PATIENT INSTRUCTIONS
## Osteoporosis on Fosamax 1229-6108, restarted Fosamax 1624-0699, on Prolia 1/2023-3/2025  ## ?primary hyperparathyroidism, so far no evidence of elevated urine calcium or parathyroid scan  ## Sub cm left sided Thyroid nodule  For osteoporosis, ok to switch from Prolia to Reclast from Oncology standpoint.  -- DXA for forearm, please call to schedule  -- Will do Reclast infusion. Possible side effects include infusion reaction, will premedication with Tylenol and do slow infusion over 30 mins. Please let me know if side effects  -- continue calcium and vitamin D, exercise and fall prevention, please let me know if you'd like to work with PT  -- Being off Prolia increases risk of rebound bone loss, will monitor bone turnover before Reclast and 3-6 months after  Lab to be done fasting in the morning (CTX and P1NP)  -- please let your dentist know that you are on Reclast  -- hyperparathyroid labs are normal except for elevated parathyroid, will monitor  -- thyroid nodule does not meet criteria for biopsy, will monitor for now    Follow-up 4-5 months
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
## 2025-07-30 ENCOUNTER — ONCOLOGY VISIT (OUTPATIENT)
Dept: ONCOLOGY | Facility: CLINIC | Age: 75
End: 2025-07-30
Attending: INTERNAL MEDICINE
Payer: COMMERCIAL

## 2025-07-30 VITALS
TEMPERATURE: 97.7 F | HEART RATE: 72 BPM | RESPIRATION RATE: 14 BRPM | WEIGHT: 161.2 LBS | OXYGEN SATURATION: 96 % | SYSTOLIC BLOOD PRESSURE: 122 MMHG | BODY MASS INDEX: 26.42 KG/M2 | DIASTOLIC BLOOD PRESSURE: 76 MMHG

## 2025-07-30 DIAGNOSIS — M81.0 AGE-RELATED OSTEOPOROSIS WITHOUT CURRENT PATHOLOGICAL FRACTURE: ICD-10-CM

## 2025-07-30 DIAGNOSIS — Z12.31 ENCOUNTER FOR SCREENING MAMMOGRAM FOR BREAST CANCER: ICD-10-CM

## 2025-07-30 DIAGNOSIS — Z17.0 MALIGNANT NEOPLASM OF UPPER-OUTER QUADRANT OF RIGHT BREAST IN FEMALE, ESTROGEN RECEPTOR POSITIVE (H): Primary | ICD-10-CM

## 2025-07-30 DIAGNOSIS — C50.411 MALIGNANT NEOPLASM OF UPPER-OUTER QUADRANT OF RIGHT BREAST IN FEMALE, ESTROGEN RECEPTOR POSITIVE (H): Primary | ICD-10-CM

## 2025-07-30 PROCEDURE — 99214 OFFICE O/P EST MOD 30 MIN: CPT | Performed by: INTERNAL MEDICINE

## 2025-07-30 PROCEDURE — G2211 COMPLEX E/M VISIT ADD ON: HCPCS | Performed by: INTERNAL MEDICINE

## 2025-07-30 PROCEDURE — G0463 HOSPITAL OUTPT CLINIC VISIT: HCPCS | Performed by: INTERNAL MEDICINE

## 2025-07-30 ASSESSMENT — PAIN SCALES - GENERAL: PAINLEVEL_OUTOF10: NO PAIN (0)

## 2025-07-30 NOTE — LETTER
7/30/2025      Angeli Jacobson  [id number]  [facility] [address]      Dear Colleague,    Thank you for referring your patient, Angeli Jacobson, to the [facility]. Please see a copy of my visit note below.    [facility]    Hematology/Oncology Established Patient Note      Today's Date: 7/30/2025    Reason for follow-up:  Right breast cancer.    HISTORY OF PRESENT ILLNESS: Angeli Jacobson is a 75 year old female with history of left thigh liposarcoma status post excision in 2000 with subsequent left leg lymphedema and peripheral arterial disease status post redo left common femoral to popliteal artery bypass in January 2019, who presents with the following oncologic history:  1.  11/7/2022: Screening mammogram showed asymmetry in the right breast at 12:00, retroareolar far posterior.  Left breast negative.  2.  11/15/2022: Diagnostic right mammogram showed asymmetry in the posterior right breast, 8 cm from nipple.  Ultrasound of right breast at 12:00, 2 cm from nipple showed a small benign cyst but no definite sonographic correlate for the mammographic asymmetry.  3.  11/16/2022: Stereotactic guided right breast needle biopsy of 8 mm lesion at 3:00, 8 cm from the nipple showed grade 2 invasive ductal carcinoma, ER positive at %, AR positive at 60-70%, HER2 IHC = 2+ equivocal, HER2/compa FISH negative.  4. 12/14/2022: Underwent right breast lumpectomy with right axillary sentinel lymph node excision with Dr. Shelly Carr.  Pathology showed no residual invasive malignancy; 2 microscopic foci of DCIS, grade 2, largest measuring 1.2 mm; margins negative; total 2 right axillary lymph nodes negative.  5. 3/21/2023: Completed adjuvant radiation therapy to the right breast.   6. 4/10/2023: Started anastrozole. Discontinued 6/19/2023 due to weakness, joint stiffness, and brain fog.  7. 8/20/2023: Switched to letrozole.  8. 12/06/2024: Held  letrozole due to left trigger finger.  Had prior right trigger finger. Then resumed.  9. 5/31/2025: Stopped letrozole.    INTERVAL HISTORY:  Angeli reports no new pain, is feeling well. She discontinued letrozole 2 months ago.      REVIEW OF SYSTEMS:   14 point ROS was reviewed and is negative other than as noted above in HPI.       HOME MEDICATIONS:  Current Outpatient Medications   Medication Sig Dispense Refill     calcium carbonate 600 mg-vitamin D 400 units (CALTRATE) 600-400 MG-UNIT per tablet Take 1 chew tab by mouth daily       clopidogrel (PLAVIX) 75 MG tablet Take 1 tablet (75 mg) by mouth daily. 90 tablet 3     COMIRNATY 30 MCG/0.3ML DAVIDA injection        darifenacin ER (ENABLEX) 15 MG 24 hr tablet Take 1 tablet (15 mg) by mouth daily. 90 tablet 3     evolocumab (REPATHA) 140 MG/ML prefilled autoinjector Inject 1 mL (140 mg) subcutaneously every 14 days. 6 mL 3     ezetimibe (ZETIA) 10 MG tablet Take 1 tablet (10 mg) by mouth daily. 90 tablet 3     FLUZONE HIGH-DOSE 0.5 ML injection        levothyroxine (SYNTHROID/LEVOTHROID) 75 MCG tablet Take 1 tablet (75 mcg) by mouth daily. 90 tablet 3     Lutein-Zeaxanthin 25-5 MG CAPS Take 1 tablet by mouth daily.       magnesium oxide 200 MG TABS Take 200 mg by mouth daily.       multivitamin, therapeutic (THERA-VIT) TABS tablet Take 1 tablet by mouth daily       nitroFURantoin macrocrystal-monohydrate (MACROBID) 100 MG capsule TAKE 1 CAPSULE AFTER INTERCOURSE 30 capsule 0     psyllium (METAMUCIL/KONSYL) capsule Take 5 capsules by mouth daily.       rivaroxaban ANTICOAGULANT (XARELTO) 2.5 MG TABS tablet Take 1 tablet (2.5 mg) by mouth 2 times daily. 180 tablet 3     rosuvastatin (CRESTOR) 20 MG tablet Take 1 tablet (20 mg) by mouth daily. 90 tablet 3     Sharps Container MISC Use as per  instructions for safe needle disposal 1 each 1     Vitamin D3 (VITAMIN D, CHOLECALCIFEROL,) 25 mcg (1000 units) tablet Take 1 tablet by mouth daily.            ALLERGIES:  Allergies   Allergen Reactions     Celexa [Citalopram Hydrobromide]      Decreased libido         PAST MEDICAL HISTORY:  Past Medical History:   Diagnosis Date     * * * SBE PROPHYLAXIS * * *     Amox 500mg, take 4 tabs one hour prior to procedure.Takes this because of lymphedema secondary from leg surgey     Antiplatelet or antithrombotic long-term use      Arrhythmia      Arthritis      Central serous retinopathy     Resolved 2001     CHRONIC NECK PAIN      Complication of anesthesia      Depressive disorder      Depressive disorder, not elsewhere classified      Elevated coronary artery calcium score=2021     Elevated coronary artery calcium score=2021     History of blood transfusion 2019    during left hip pinning, during surgery for patella     Lateral epicondylitis      MIXED HYPERLIPIDEMIA, LDL GOAL <160     LDL goal < 160     Motion sickness      MYXOID LIPOSARCOMA 2000    Left thigh, S/P excision, radiation  at [facility]     Myxoid liposarcoma (HCC) 2004    CHRONIC LEFT THIGH LYMPHEDEMA     Nontoxic multinodular goiter 2005    needs yearly      ALEXANDER (obstructive sleep apnea) 2024     Osteoporosis, unspecified      Other chronic pain     fx ribs left      Other lymphedema 2000    left thigh, gets regular PT for this     Overactive bladder      PAD (peripheral artery disease) 2018     PONV (postoperative nausea and vomiting)      SHINGLES      Sprain of lumbosacral (joint) (ligament)     right     Unspecified hearing loss     chronic tinnitus     Unspecified tinnitus      Gynecologic history:  Age of menarche at 11, , age of first pregnancy at 29, 5 years of OCP use, no HRT or fertility treatment.    PAST SURGICAL HISTORY:  Past Surgical History:   Procedure Laterality Date     ARTHROPLASTY HIP Left 10/04/2019    Procedure: Removal of left femoral hardware with a conversion to left total  hip arthroplasty using a Biomet Annalisa femoral stem with an OsseoTi acetabular shell and a dual mobility bearing surface;  Surgeon: Spencer Celeste MD;  Location:  OR     BIOPSY  04/2000     BIOPSY BREAST SEED LOCALIZATION Right 12/14/2022    Procedure: right breast tag localized lumpectomy;  Surgeon: Shelly Carr MD;  Location:  OR     BIOPSY NODE SENTINEL Right 12/14/2022    Procedure: with right sentinel lymph node biopsy;  Surgeon: Shelly Carr MD;  Location:  OR     BREAST SURGERY  December 2022     BYPASS GRAFT FEMOROPOPLITEAL Left 01/21/2019    Procedure: LEFT FEMORAL TO ABOVE KNEE POPLITEAL  BYPASS WITH POLYTETRAFLUOROETHYLENE GRAFT;  Surgeon: Shade Owens MD;  Location:  OR     COLONOSCOPY N/A 02/05/2016    Procedure: COMBINED COLONOSCOPY, SINGLE OR MULTIPLE BIOPSY/POLYPECTOMY BY BIOPSY;  Surgeon: Varun Stanley MD, MD;  Location:  GI     COLONOSCOPY N/A 12/10/2021    Procedure: COLONOSCOPY, WITH POLYPECTOMIES  USING COLD  SNARE,   CLIP APPLIED X2;  Surgeon: Dayton Luna MD;  Location:  GI     CYSTOSCOPY       EXCISION MALIG LESION>1.25CM  05/2000    Myxoid Liposarcoma       EXPLORE GROIN Right 05/01/2018    Procedure: EXPLORE GROIN;  EMERGENCY RIGHT FEMORAL EXPLORATION WITH FEMORAL ARTERY REPAIR.    EBL: 50mL;  Surgeon: Shade Owens MD;  Location:  OR     EYE SURGERY  Cataract removal 8- 2022    laser treatment for posterior capsular opacity   correct posterior capsulare     HC COLP CERVIX/UPPER VAGINA  07/1997    Negative     HC DILATION/CURETTAGE DIAG/THER NON OB  02/1997    Post menopausal bleeding on HRT, negative     HIP SURGERY Left 04/13/2019     IR ANGIOGRAM THROUGH CATHETER FOLLOW UP  12/20/2018     IR ANGIOGRAM THROUGH CATHETER FOLLOW UP  12/21/2018     IR LOWER EXTREMITY ANGIOGRAM LEFT  12/19/2018     OPEN REDUCTION INTERNAL FIXATION PATELLA Left 12/21/2020    Procedure: Left partial patella fracture excision with advancement of the quadriceps  tendon;  Surgeon: Stephen Rhodes MD;  Location: [facility] OR     OPEN REDUCTION INTERNAL FIXATION RODDING INTRAMEDULLARY TIBIA Left 12/21/2020    Procedure: Open reduction intramedullary nailing of left tibia fracture;  Surgeon: Stephen Rhodes MD;  Location: [facility] OR     REMOVE HARDWARE KNEE Left 05/31/2022    Procedure: Left knee patella hardware removal;  Surgeon: Spencer Celeste MD;  Location: [facility] OR     REPAIR TENDON QUADRICEPS Left 07/28/2021    Procedure: Left knee quadricepsplasty with intraoperative patellar fracture requiring open reduction and internal fixation of the patella;  Surgeon: Spencer Celeste MD;  Location: [facility] OR     REPAIR TENDON QUADRICEPS Left 05/31/2022    Procedure: Open left knee VY quadricepsplasty with hardware removal and allograft;  Surgeon: Spencer Celeste MD;  Location: [facility] OR     VASCULAR SURGERY  04/30/2018    Left SFA stent in bypass graft     ZZC APPENDECTOMY      Appendectomy     ZZC NONSPECIFIC PROCEDURE  04/2000    Open Biopsy Left Thigh Liposarcoma     ZZHC COLONOSCOPY THRU STOMA, DIAGNOSTIC  2006    due 2010         SOCIAL HISTORY:  Social History     Socioeconomic History     Marital status:      Spouse name: Chris     Number of children: 3     Years of education: 14     Highest education level: Associate degree: academic program   Occupational History     Occupation: at home     Employer: NONE    Tobacco Use     Smoking status: Never     Passive exposure: Never     Smokeless tobacco: Never   Vaping Use     Vaping status: Never Used   Substance and Sexual Activity     Alcohol use: Never     Drug use: Never     Sexual activity: Yes     Partners: Male     Birth control/protection: Male Surgical     Comment:  had a vasectomy   Other Topics Concern     Parent/sibling w/ CABG, MI or angioplasty before 65F 55M? No   Social History Narrative     Not on file     Social Drivers of Health     Financial Resource Strain: Low Risk  (7/19/2024)     Financial Resource Strain      Within the past 12 months, have you or your family members you live with been unable to get utilities (heat, electricity) when it was really needed?: No   Food Insecurity: Low Risk  (7/19/2024)    Food Insecurity      Within the past 12 months, did you worry that your food would run out before you got money to buy more?: No      Within the past 12 months, did the food you bought just not last and you didn t have money to get more?: No   Transportation Needs: Low Risk  (7/19/2024)    Transportation Needs      Within the past 12 months, has lack of transportation kept you from medical appointments, getting your medicines, non-medical meetings or appointments, work, or from getting things that you need?: No   Physical Activity: Inactive (7/19/2024)    Exercise Vital Sign      Days of Exercise per Week: 0 days      Minutes of Exercise per Session: 0 min   Stress: No Stress Concern Present (7/19/2024)    [employer] [place] [employer] - Occupational Stress Questionnaire      Feeling of Stress : Only a little   Social Connections: Unknown (7/19/2024)    Social Connection and Isolation Panel [NHANES]      Frequency of Communication with Friends and Family: Not on file      Frequency of Social Gatherings with Friends and Family: Once a week      Attends Latter-day Services: Not on file      Active Member of Clubs or Organizations: Not on file      Attends Club or Organization Meetings: Not on file      Marital Status: Not on file   Interpersonal Safety: Low Risk  (7/9/2025)    Interpersonal Safety      Do you feel physically and emotionally safe where you currently live?: Yes      Within the past 12 months, have you been hit, slapped, kicked or otherwise physically hurt by someone?: No      Within the past 12 months, have you been humiliated or emotionally abused in other ways by your partner or ex-partner?: No   Housing Stability: Low Risk  (7/19/2024)    Housing Stability      Do  you have housing? : Yes      Are you worried about losing your housing?: No         FAMILY HISTORY:  Family History   Problem Relation Age of Onset     C.A.D. Father         MI 57     Alcohol/Drug Father         etoh     Coronary Artery Disease Father      Early Death Father         Heart Attack age 57     Obesity Mother      Osteoporosis Mother      Hyperlipidemia Son      Anxiety Disorder Son      Hyperlipidemia Son         very high, experimental drug     C.A.D. Paternal Grandmother         ascvd     Diabetes Maternal Grandmother      Cancer Maternal Grandmother      C.A.D. Paternal Uncle         Mi  age 48     Cancer Maternal Aunt         pancreatic CA     Hyperlipidemia Son      Hyperlipidemia Cousin      Other Cancer Other      Anxiety Disorder Son      Hyperlipidemia Cousin      Coronary Artery Disease Cousin      Hyperlipidemia Son      Anxiety Disorder Son      Hyperlipidemia Son      Heart Disease Son      Hyperlipidemia Other      Hyperlipidemia Other      Coronary Artery Disease Cousin      Anxiety Disorder Son      Other Cancer Other      Colon Cancer No family hx of      Brother with colon cancer.  Negative for breast, ovarian or prostate cancer.    PHYSICAL EXAM:  Vital signs:  /76   Pulse 72   Temp 97.7  F (36.5  C) (Oral)   Resp 14   Wt 73.1 kg (161 lb 3.2 oz)   LMP  (LMP Unknown)   SpO2 96%   BMI 26.42 kg/m     GENERAL/CONSTITUTIONAL: No acute distress.  EYES: No erythema or scleral icterus.  LYMPH: No cervical, supraclavicular, axillary adenopathy.   BREAST: No palpable masses in either breast. Nipples are everted bilaterally with no discharge. No erythema, ulceration, or dimpling of the skin.  RESPIRATORY: No audible cough or wheezing.   GASTROINTESTINAL: No hepatosplenomegaly, masses, or tenderness. No guarding.  No distention.  MUSCULOSKELETAL: Warm and well-perfused, no cyanosis, clubbing; chronic left lower extremity edema in compression garment.  NEUROLOGIC: No focal  motor deficits. Alert, oriented, answers questions appropriately.  INTEGUMENTARY: No rashes or jaundice.  GAIT: Steady, does not use assistive device    LABS:  CBC RESULTS:   Recent Labs   Lab Test 05/29/25  0842   WBC 5.1   RBC 4.77   HGB 14.2   HCT 43.3   MCV 91   MCH 29.8   MCHC 32.8   RDW 13.0         Last Comprehensive Metabolic Panel:  Sodium   Date Value Ref Range Status   07/23/2025 139 135 - 145 mmol/L Final   05/17/2021 142 133 - 144 mmol/L Final     Potassium   Date Value Ref Range Status   07/23/2025 4.2 3.4 - 5.3 mmol/L Final   05/26/2022 3.9 3.4 - 5.3 mmol/L Final   05/17/2021 4.3 3.4 - 5.3 mmol/L Final     Chloride   Date Value Ref Range Status   07/23/2025 105 98 - 107 mmol/L Final   05/26/2022 106 94 - 109 mmol/L Final   05/17/2021 107 94 - 109 mmol/L Final     Carbon Dioxide   Date Value Ref Range Status   05/17/2021 32 20 - 32 mmol/L Final     Carbon Dioxide (CO2)   Date Value Ref Range Status   07/23/2025 26 22 - 29 mmol/L Final   05/26/2022 29 20 - 32 mmol/L Final     Anion Gap   Date Value Ref Range Status   07/23/2025 8 7 - 15 mmol/L Final   05/26/2022 4 3 - 14 mmol/L Final   05/17/2021 3 3 - 14 mmol/L Final     Glucose   Date Value Ref Range Status   07/23/2025 90 70 - 99 mg/dL Final   06/01/2022 137 (H) 70 - 99 mg/dL Final   05/17/2021 78 70 - 99 mg/dL Final     Comment:     Fasting specimen     Urea Nitrogen   Date Value Ref Range Status   07/23/2025 16.5 8.0 - 23.0 mg/dL Final   05/26/2022 17 7 - 30 mg/dL Final   05/17/2021 13 7 - 30 mg/dL Final     Creatinine   Date Value Ref Range Status   07/23/2025 0.65 0.51 - 0.95 mg/dL Final   05/17/2021 0.75 0.52 - 1.04 mg/dL Final     GFR Estimate   Date Value Ref Range Status   07/23/2025 >90 >60 mL/min/1.73m2 Final     Comment:     eGFR calculated using 2021 CKD-EPI equation.   05/17/2021 81 >60 mL/min/[1.73_m2] Final     Comment:     Non  GFR Calc  Starting 12/18/2018, serum creatinine based estimated GFR (eGFR) will be    calculated using the Chronic Kidney Disease Epidemiology Collaboration   (CKD-EPI) equation.       Calcium   Date Value Ref Range Status   07/23/2025 9.7 8.8 - 10.4 mg/dL Final   05/17/2021 9.3 8.5 - 10.1 mg/dL Final     Bilirubin Total   Date Value Ref Range Status   05/29/2025 0.3 <=1.2 mg/dL Final   05/17/2021 0.4 0.2 - 1.3 mg/dL Final     Alkaline Phosphatase   Date Value Ref Range Status   05/29/2025 69 40 - 150 U/L Final   05/17/2021 88 40 - 150 U/L Final     ALT   Date Value Ref Range Status   05/29/2025 30 0 - 50 U/L Final   05/17/2021 26 0 - 50 U/L Final     AST   Date Value Ref Range Status   05/29/2025 33 0 - 45 U/L Final   05/17/2021 20 0 - 45 U/L Final       PATHOLOGY:  None new.    IMAGING:  Reviewed as per A/P.    ASSESSMENT/PLAN:  Angeli Jacobson is a 75 year old female with the following issues:  1. Pathologic prognostic stage IA, [id number], grade 2 invasive ductal carcinoma of the right upper outer breast, ER positive (%), WA positive (70%), HER2/compa FISH negative  2. Polyarthralgia  -Angeli is s/p right lumpectomy and radiation completed 3/21/2023.    --She has no clinical evidence for recurrent breast cancer by physical exam.  --I had advised a total of 5 years of hormone blockade therapy.  --Due to increase in mental and physical fatigue, she switched from anastrozole to letrozole on 8/30/2023 but held on 12/06/2024 due to recurrent trigger finger and polyarthralgia. Then resumed but ultimately discontinued on 5/31/2025 due to fatigue, body and bone aches.  --She declines to try alternate hormone blockade therapies.  --Due for next annual bilateral mammogram for 11/2025. Will arrange.    3.  History of myxoid liposarcoma of left thigh  - Status post excision and radiation completed in 2000.  She did not receive chemotherapy for her liposarcoma.  This has resulted in left lower extremity lymphedema.  - Continue lymphedema therapy and compression stockings.    4.  Peripheral  "arterial disease  -Has history of left lower extremity peripheral arterial disease and is status post femoral-popliteal bypass with later stent to bypass.  She is on aspirin and Plavix.    5. Osteoporosis  --History of taking alendronate, off for past year.  - DEXA scan from 12/22/2022 and 12/27/2023 showed osteoporosis. Machines were not the same and therefore results not comparable.  -- 12/18/2024 DEXA showed osteopenia with 1.8% increase in lumbar spine BMD and 1.4% increase in the right hip BMD.  - Continue adequate calcium and vitamin D.  --She has a follow-up with endocrinology.  She will be switching from Prolia to Reclast.    Return in 6 months.    Emilia Angulo MD  [facility] Hematology/Oncology     Total time spent today: 30 minutes in chart review, patient evaluation, counseling, documentation, test and/or medication/prescription orders, and coordination of care.     The longitudinal plan of care for the diagnosis(es)/condition(s) as documented were addressed during this visit. Due to the added complexity in care, I will continue to support Angeli in the subsequent management and with ongoing continuity of care.      Oncology Rooming Note    July 30, 2025 10:26 AM   Angeli PARTH Jacobson is a 75 year old female who presents for:    Chief Complaint   Patient presents with     Oncology Clinic Visit     Initial Vitals: /76   Pulse 72   Temp 97.7  F (36.5  C) (Oral)   Resp 14   Wt 73.1 kg (161 lb 3.2 oz)   LMP  (LMP Unknown)   SpO2 96%   BMI 26.42 kg/m   Estimated body mass index is 26.42 kg/m  as calculated from the following:    Height as of 7/29/25: 1.664 m (5' 5.5\").    Weight as of this encounter: 73.1 kg (161 lb 3.2 oz). Body surface area is 1.84 meters squared.  No Pain (0) Comment: Data Unavailable   No LMP recorded (lmp unknown). Patient is postmenopausal.  Allergies reviewed: Yes  Medications reviewed: Yes    Medications: Medication refills not needed today.  Pharmacy name " entered into [facility]: [id number] [place] [facility] [place] - [address] [facility]    PHQ9:  Did this patient require a PHQ9?: No      Clinical concerns: no       Shari J. Schoenberger, [facility]                Again, thank you for allowing me to participate in the care of your patient.        Sincerely,        Emilia Angulo MD    Electronically signed

## 2025-07-30 NOTE — PROGRESS NOTES
"Oncology Rooming Note    July 30, 2025 10:26 AM   Angeli Jacobson is a 75 year old female who presents for:    Chief Complaint   Patient presents with    Oncology Clinic Visit     Initial Vitals: /76   Pulse 72   Temp 97.7  F (36.5  C) (Oral)   Resp 14   Wt 73.1 kg (161 lb 3.2 oz)   LMP  (LMP Unknown)   SpO2 96%   BMI 26.42 kg/m   Estimated body mass index is 26.42 kg/m  as calculated from the following:    Height as of 7/29/25: 1.664 m (5' 5.5\").    Weight as of this encounter: 73.1 kg (161 lb 3.2 oz). Body surface area is 1.84 meters squared.  No Pain (0) Comment: Data Unavailable   No LMP recorded (lmp unknown). Patient is postmenopausal.  Allergies reviewed: Yes  Medications reviewed: Yes    Medications: Medication refills not needed today.  Pharmacy name entered into [facility]: [facility] [id number] [place] [address]    PHQ9:  Did this patient require a PHQ9?: No      Clinical concerns: no       Shari J. Schoenberger, EMELY              "

## 2025-08-01 SDOH — HEALTH STABILITY: PHYSICAL HEALTH: ON AVERAGE, HOW MANY MINUTES DO YOU ENGAGE IN EXERCISE AT THIS LEVEL?: 0 MIN

## 2025-08-01 SDOH — HEALTH STABILITY: PHYSICAL HEALTH: ON AVERAGE, HOW MANY DAYS PER WEEK DO YOU ENGAGE IN MODERATE TO STRENUOUS EXERCISE (LIKE A BRISK WALK)?: 0 DAYS

## 2025-08-01 ASSESSMENT — SOCIAL DETERMINANTS OF HEALTH (SDOH): HOW OFTEN DO YOU GET TOGETHER WITH FRIENDS OR RELATIVES?: ONCE A WEEK

## 2025-08-06 ENCOUNTER — OFFICE VISIT (OUTPATIENT)
Dept: INTERNAL MEDICINE | Facility: CLINIC | Age: 75
End: 2025-08-06
Payer: COMMERCIAL

## 2025-08-06 VITALS
BODY MASS INDEX: 26.03 KG/M2 | RESPIRATION RATE: 14 BRPM | TEMPERATURE: 98 F | HEART RATE: 66 BPM | WEIGHT: 162 LBS | OXYGEN SATURATION: 97 % | SYSTOLIC BLOOD PRESSURE: 108 MMHG | HEIGHT: 66 IN | DIASTOLIC BLOOD PRESSURE: 66 MMHG

## 2025-08-06 DIAGNOSIS — C49.9 MYXOID LIPOSARCOMA (H): ICD-10-CM

## 2025-08-06 DIAGNOSIS — R73.01 IMPAIRED FASTING GLUCOSE: ICD-10-CM

## 2025-08-06 DIAGNOSIS — Z17.0 MALIGNANT NEOPLASM OF UPPER-OUTER QUADRANT OF RIGHT BREAST IN FEMALE, ESTROGEN RECEPTOR POSITIVE (H): ICD-10-CM

## 2025-08-06 DIAGNOSIS — Z00.00 ROUTINE GENERAL MEDICAL EXAMINATION AT A HEALTH CARE FACILITY: Primary | ICD-10-CM

## 2025-08-06 DIAGNOSIS — C49.22 MALIGNANT NEOPLASM OF CONNECTIVE AND SOFT TISSUE OF LEFT LOWER LIMB, INCLUDING HIP (H): ICD-10-CM

## 2025-08-06 DIAGNOSIS — Z85.831 HISTORY OF SARCOMA: ICD-10-CM

## 2025-08-06 DIAGNOSIS — N39.0 RECURRENT UTI: ICD-10-CM

## 2025-08-06 DIAGNOSIS — C50.411 MALIGNANT NEOPLASM OF UPPER-OUTER QUADRANT OF RIGHT BREAST IN FEMALE, ESTROGEN RECEPTOR POSITIVE (H): ICD-10-CM

## 2025-08-06 DIAGNOSIS — M79.2 NEUROPATHIC PAIN: ICD-10-CM

## 2025-08-06 DIAGNOSIS — Z23 NEED FOR VACCINATION: ICD-10-CM

## 2025-08-06 PROCEDURE — 3074F SYST BP LT 130 MM HG: CPT | Performed by: INTERNAL MEDICINE

## 2025-08-06 PROCEDURE — G0439 PPPS, SUBSEQ VISIT: HCPCS | Performed by: INTERNAL MEDICINE

## 2025-08-06 PROCEDURE — G2211 COMPLEX E/M VISIT ADD ON: HCPCS | Performed by: INTERNAL MEDICINE

## 2025-08-06 PROCEDURE — 3078F DIAST BP <80 MM HG: CPT | Performed by: INTERNAL MEDICINE

## 2025-08-06 PROCEDURE — 99214 OFFICE O/P EST MOD 30 MIN: CPT | Mod: 25 | Performed by: INTERNAL MEDICINE

## 2025-08-06 RX ORDER — GABAPENTIN 100 MG/1
100 CAPSULE ORAL 3 TIMES DAILY PRN
Qty: 30 CAPSULE | Refills: 2 | Status: SHIPPED | OUTPATIENT
Start: 2025-08-06

## 2025-08-06 RX ORDER — NITROFURANTOIN 25; 75 MG/1; MG/1
CAPSULE ORAL
Qty: 30 CAPSULE | Refills: 0 | Status: SHIPPED | OUTPATIENT
Start: 2025-08-06

## 2025-08-16 ENCOUNTER — HEALTH MAINTENANCE LETTER (OUTPATIENT)
Age: 75
End: 2025-08-16

## 2025-08-20 ENCOUNTER — OFFICE VISIT (OUTPATIENT)
Dept: UROLOGY | Facility: CLINIC | Age: 75
End: 2025-08-20
Payer: COMMERCIAL

## 2025-08-20 VITALS
BODY MASS INDEX: 25.23 KG/M2 | SYSTOLIC BLOOD PRESSURE: 120 MMHG | DIASTOLIC BLOOD PRESSURE: 75 MMHG | OXYGEN SATURATION: 98 % | WEIGHT: 157 LBS | HEIGHT: 66 IN | HEART RATE: 71 BPM

## 2025-08-20 DIAGNOSIS — M79.18 MYALGIA OF PELVIC FLOOR: ICD-10-CM

## 2025-08-20 DIAGNOSIS — N32.81 OAB (OVERACTIVE BLADDER): ICD-10-CM

## 2025-08-20 DIAGNOSIS — M62.89 PELVIC FLOOR DYSFUNCTION: ICD-10-CM

## 2025-08-20 DIAGNOSIS — R10.2 PELVIC PRESSURE IN FEMALE: ICD-10-CM

## 2025-08-20 DIAGNOSIS — R33.9 INCOMPLETE BLADDER EMPTYING: Primary | ICD-10-CM

## 2025-08-20 PROCEDURE — 3078F DIAST BP <80 MM HG: CPT | Performed by: UROLOGY

## 2025-08-20 PROCEDURE — 3074F SYST BP LT 130 MM HG: CPT | Performed by: UROLOGY

## 2025-08-20 PROCEDURE — 99214 OFFICE O/P EST MOD 30 MIN: CPT | Performed by: UROLOGY

## 2025-08-26 ENCOUNTER — THERAPY VISIT (OUTPATIENT)
Dept: PHYSICAL THERAPY | Facility: CLINIC | Age: 75
End: 2025-08-26
Payer: COMMERCIAL

## 2025-08-26 DIAGNOSIS — R53.81 PHYSICAL DECONDITIONING: ICD-10-CM

## 2025-08-26 DIAGNOSIS — L90.5 SCAR CONDITION AND FIBROSIS OF SKIN: ICD-10-CM

## 2025-08-26 DIAGNOSIS — I89.0 LYMPHEDEMA: Primary | ICD-10-CM

## 2025-08-26 PROCEDURE — 97110 THERAPEUTIC EXERCISES: CPT | Mod: GP | Performed by: PHYSICAL THERAPIST

## 2025-08-26 PROCEDURE — 97535 SELF CARE MNGMENT TRAINING: CPT | Mod: GP | Performed by: PHYSICAL THERAPIST

## 2025-08-26 PROCEDURE — 97140 MANUAL THERAPY 1/> REGIONS: CPT | Mod: GP | Performed by: PHYSICAL THERAPIST

## (undated) DEVICE — BLADE CLIPPER 4406

## (undated) DEVICE — SYR 10ML FINGER CONTROL W/O NDL 309695

## (undated) DEVICE — LINEN FULL SHEET 5511

## (undated) DEVICE — BNDG ELASTIC 6" DBL LENGTH UNSTERILE 6611-16

## (undated) DEVICE — DRAPE C-ARMOR 5 SIDED 5523

## (undated) DEVICE — SPONGE LAP 18X18" X8435

## (undated) DEVICE — SET HANDPIECE INTERPULSE W/COAXIAL FAN SPRAY TIP 0210118000

## (undated) DEVICE — GLOVE PROTEXIS POWDER FREE 8.0 ORTHOPEDIC 2D73ET80

## (undated) DEVICE — DRILL BIT QUICK COUPLING CANN 2.7MM 310.67

## (undated) DEVICE — TUBING SUCTION MEDI-VAC SOFT 3/16"X20' N520A

## (undated) DEVICE — PREP CHLORAPREP 26ML TINTED HI-LITE ORANGE 930815

## (undated) DEVICE — GLOVE PROTEXIS POWDER FREE 7.0 ORTHOPEDIC 2D73ET70

## (undated) DEVICE — ESU GROUND PAD ADULT W/CORD E7507

## (undated) DEVICE — SU ETHILON 3-0 FS-1 18" 669H

## (undated) DEVICE — SOL WATER IRRIG 1000ML BOTTLE 2F7114

## (undated) DEVICE — SURGICEL HEMOSTAT 2X14" 1951

## (undated) DEVICE — CATH TRAY FOLEY SURESTEP 16FR DRAIN BAG STATOCK A899916

## (undated) DEVICE — GLOVE PROTEXIS POWDER FREE SMT 8.0  2D72PT80X

## (undated) DEVICE — SOL NACL 0.9% INJ 250ML BAG 2B1322Q

## (undated) DEVICE — SU SILK 2-0 SH 30" K833H

## (undated) DEVICE — BLADE CLIPPER 3M 9670

## (undated) DEVICE — LINEN TOWEL PACK X5 5464

## (undated) DEVICE — SU FIBERWIRE 2 38"  AR-7200

## (undated) DEVICE — PACK UNIVERSAL SPLIT 29131

## (undated) DEVICE — PREP CHLORAPREP 26ML TINTED ORANGE  260815

## (undated) DEVICE — PACK MINOR SBA15MIFSE

## (undated) DEVICE — SU VICRYL 0 CT-1 CR 8X27" UND JJ41G

## (undated) DEVICE — GLOVE PROTEXIS BLUE W/NEU-THERA 7.0  2D73EB70

## (undated) DEVICE — DECANTER BAG 2002S

## (undated) DEVICE — ENDO TRAP POLYP QUICK CATCH 710201

## (undated) DEVICE — LINEN DRAPE 54X72" 5467

## (undated) DEVICE — SUCTION CANISTER MEDIVAC LINER 3000ML W/LID 65651-530

## (undated) DEVICE — PACK TOTAL KNEE BOXED LATEX FREE PO15TKFCT

## (undated) DEVICE — TOURNIQUET SGL  BLADDER 30"X4" BLUE 5921030135

## (undated) DEVICE — LINEN HALF SHEET 5512

## (undated) DEVICE — DRAPE STOCKINETTE IMPERVIOUS 12" 1587

## (undated) DEVICE — SOL NACL 0.9% IRRIG 1000ML BOTTLE 07138-09

## (undated) DEVICE — SU VICRYL 1 MO-4 18" J702D

## (undated) DEVICE — ESU GROUND PAD UNIVERSAL W/O CORD

## (undated) DEVICE — PACK VASCULAR SCV15VAFSB

## (undated) DEVICE — BLADE SAW SAGITTAL STRK 25X90X1.27MM HD SYS 6 6125-127-090

## (undated) DEVICE — DRAPE STERI U 1015

## (undated) DEVICE — SU VICRYL 3-0 SH 27" J316H

## (undated) DEVICE — ESU ELEC BLADE 2.75" COATED/INSULATED E1455

## (undated) DEVICE — GLOVE PROTEXIS BLUE W/NEU-THERA 8.0  2D73EB80

## (undated) DEVICE — DRSG XEROFORM 5X9" 8884431605

## (undated) DEVICE — SUTURE MONOCRYL+ 2-0 CT-1 36" UNDYED MCP945H

## (undated) DEVICE — SU VICRYL+ 0 CT 36" DYED VCP358H

## (undated) DEVICE — CLIP ETHICON LIGACLIP SM BLUE LT100

## (undated) DEVICE — ESU ELEC BLADE NN-STCK PLUMEPEN PRO 360D 10FT PLPRO4020

## (undated) DEVICE — MARKER MARGIN MARKER STD 6 COLOR SGL USE MMS6

## (undated) DEVICE — STPL SKIN 35W 6.9MM  PXW35

## (undated) DEVICE — SU NDL MAYO TROCAR MED 217003

## (undated) DEVICE — Device

## (undated) DEVICE — GLOVE BIOGEL PI MICRO SZ 6.0 48560

## (undated) DEVICE — DRAPE IOBAN INCISE 23X17" 6650EZ

## (undated) DEVICE — CLIP HEMOCLIP ENDOSCOPIC INSTINCT 2.8X230CM INSC-7-230-SS

## (undated) DEVICE — SU VICRYL 2-0 CT-1 27" J339H

## (undated) DEVICE — GOWN IMPERVIOUS SPECIALTY XLG/XLONG 32474

## (undated) DEVICE — SYR 10ML SLIP TIP W/O NDL

## (undated) DEVICE — CAST PADDING 6" UNSTERILE 9046

## (undated) DEVICE — SYR 05ML SLIP TIP W/O NDL

## (undated) DEVICE — PAD CHUX UNDERPAD 23X24" 7136

## (undated) DEVICE — CATH FOGARTY EMBOLECTOMY 3FR 80CM LATEX 120803FP

## (undated) DEVICE — SUCTION MANIFOLD NEPTUNE 2 SYS 4 PORT 0702-020-000

## (undated) DEVICE — DRSG ADAPTIC 3X8" 6113

## (undated) DEVICE — GLOVE PROTEXIS POWDER FREE 7.5 ORTHOPEDIC 2D73ET75

## (undated) DEVICE — SU VICRYL 3-0 SH 27" UND J416H

## (undated) DEVICE — DRSG TELFA ISLAND 4X8" 7541

## (undated) DEVICE — SUCTION TIP YANKAUER STR K87

## (undated) DEVICE — SU SILK 0 24" TIE SA76G

## (undated) DEVICE — SU UMBILICAL TAPE 36X.125" U12T

## (undated) DEVICE — DRSG STERI STRIP 1/2X4" R1547

## (undated) DEVICE — GUIDE WIRE 1.25X150MM 4.0MM THRD 900.722

## (undated) DEVICE — SU ETHIBOND 5 V-37 4X30" MB66G

## (undated) DEVICE — MANIFOLD NEPTUNE 4 PORT 700-20

## (undated) DEVICE — COVER SHOE STERILE

## (undated) DEVICE — NDL 21GA 1.5"

## (undated) DEVICE — LINEN ORTHO ACL PACK 5447

## (undated) DEVICE — LINEN LEG ROLL 5489

## (undated) DEVICE — SET BREAST BIOPSY LOCALIZER 20 PROBE 8MM PENCIL 09-0006

## (undated) DEVICE — ROD SYN REAMER BALL TIP 2.5X950MM  351.706S

## (undated) DEVICE — SUTURE MONOCRYL+ 3-0 PS-1 27" UNDYED MCP936H

## (undated) DEVICE — SU VICRYL+ 1 MO-4 18" DYED VCP702D

## (undated) DEVICE — SYR BULB IRRIG DOVER 60 ML LATEX FREE 67000

## (undated) DEVICE — DRAPE BREAST/CHEST 29420

## (undated) DEVICE — DEVICE RETRIEVER HEWSON 71111579

## (undated) DEVICE — GLOVE PROTEXIS MICRO 8.5  2D73PM85

## (undated) DEVICE — DRILL BIT QUICK COUPLING 3 FLUTE 4.2MMX330/100MM CALIBRATE

## (undated) DEVICE — BAG CLEAR TRASH 1.3M 39X33" P4040C

## (undated) DEVICE — SU SILK 3-0 TIE 24" SA74H

## (undated) DEVICE — SU SILK 2-0 TIE 24" SA75H

## (undated) DEVICE — ESU PENCIL W/SMOKE EVAC NEPTUNE STRYKER 0703-046-000

## (undated) DEVICE — SU SILK 4-0 TIE 24" SA73H

## (undated) DEVICE — DRSG AQUACEL AG 3.5X9.75" HYDROFIBER 412011

## (undated) DEVICE — DRSG TELFA ISLAND 4X14" 7544

## (undated) DEVICE — DRILL BIT QUICK COUPLING 3 FLUTE 4.2MMX145MM NDL  POINT

## (undated) DEVICE — SUTURE VICRYL+ 0 CT-1 18" DYED VIO VCP740D

## (undated) DEVICE — DRAPE COVER C-ARM SEAMLESS SNAP-KAP 03-KP26 LATEX FREE

## (undated) DEVICE — SU VICRYL 2-0 CT-2 27" UND J269H

## (undated) DEVICE — SU MONOCRYL 4-0 PS-2 18" UND Y496G

## (undated) DEVICE — SOL NACL 0.9% IRRIG 3000ML BAG 2B7477

## (undated) DEVICE — CLIP SPRING APPLIED MEDICAL SOFT FIBRA G-6050

## (undated) DEVICE — PACK LOWER EXTREMITY RIDGES

## (undated) DEVICE — SU PROLENE 7-0 BV-1DA 4X24" M8702

## (undated) DEVICE — IMM PILLOW ABDUCT HIP MED M60-025-M

## (undated) DEVICE — DRSG XEROFORM 1X8"

## (undated) DEVICE — SYR 03ML LL W/O NDL 309657

## (undated) DEVICE — CAST PADDING 6" STERILE 9046S

## (undated) DEVICE — SYR BULB IRRIG 50ML LATEX FREE 0035280

## (undated) DEVICE — CATH TRAY FOLEY SURESTEP 16FR WDRAIN BAG STLK LATEX A300316A

## (undated) DEVICE — DRAPE CONVERTORS U-DRAPE 60X72" 8476

## (undated) DEVICE — SU VICRYL 3-0 CT-1 36" J338H

## (undated) DEVICE — SU PROLENE 5-0 C-1DA 36" 8720H

## (undated) DEVICE — SU PROLENE 6-0 C-1DA 30" 8706H

## (undated) DEVICE — DRSG ABDOMINAL 07 1/2X8" 7197D

## (undated) DEVICE — SU VICRYL 0 CT-1 36" J346H

## (undated) DEVICE — GLOVE BIOGEL PI MICRO INDICATOR UNDERGLOVE SZ 6.5 48965

## (undated) DEVICE — DRAPE X-RAY TUBE 00-901169-01-OEC

## (undated) DEVICE — DRAPE STERI TOWEL LG 1010

## (undated) DEVICE — PACK TOTAL HIP RIDGES LATEX PO15HIFSG

## (undated) DEVICE — NDL 25GA 1.5" 305127

## (undated) DEVICE — CAST PADDING 4" STERILE 9044S

## (undated) DEVICE — DECANTER VIAL 2006S

## (undated) DEVICE — DRSG AQUACEL AG 3.5X12" HYDROFIBER 420670

## (undated) DEVICE — DRAPE LEGGINGS 8421

## (undated) DEVICE — WIRE GUIDE 3.2X400MM  357.399

## (undated) DEVICE — NDL 19GA 1.5"

## (undated) DEVICE — HOOD FLYTE W/PEELAWAY 408-800-100

## (undated) DEVICE — PREP SKIN SCRUB TRAY 4461A

## (undated) DEVICE — KIT ENDO TURNOVER/PROCEDURE W/CLEAN A SCOPE LINERS 103888

## (undated) DEVICE — SYR 10ML SLIP TIP W/O NDL 303134

## (undated) DEVICE — ENDO SNARE EXACTO COLD 9MM LOOP 2.4MMX230CM 00711115

## (undated) DEVICE — IMM KNEE 20"

## (undated) DEVICE — DRSG KERLIX 4 1/2"X4YDS ROLL 6715

## (undated) DEVICE — BNDG ELASTIC 6"X5YDS STERILE 6611-6S

## (undated) DEVICE — SPONGE RAY-TEC 4X8" 7318

## (undated) DEVICE — DRSG GAUZE 4X4" TRAY

## (undated) DEVICE — CLEANSER JET LAVAGE IRRISEPT 0.05% CHG IRRISEPT45USA

## (undated) DEVICE — DRSG AQUACEL AG HYDROFIBER  3.5X10" 422605

## (undated) RX ORDER — CEFAZOLIN SODIUM 1 G/3ML
INJECTION, POWDER, FOR SOLUTION INTRAMUSCULAR; INTRAVENOUS
Status: DISPENSED
Start: 2019-01-21

## (undated) RX ORDER — LIDOCAINE HYDROCHLORIDE 10 MG/ML
INJECTION, SOLUTION EPIDURAL; INFILTRATION; INTRACAUDAL; PERINEURAL
Status: DISPENSED
Start: 2022-05-31

## (undated) RX ORDER — DEXAMETHASONE SODIUM PHOSPHATE 4 MG/ML
INJECTION, SOLUTION INTRA-ARTICULAR; INTRALESIONAL; INTRAMUSCULAR; INTRAVENOUS; SOFT TISSUE
Status: DISPENSED
Start: 2022-12-14

## (undated) RX ORDER — MEPERIDINE HYDROCHLORIDE 25 MG/ML
INJECTION INTRAMUSCULAR; INTRAVENOUS; SUBCUTANEOUS
Status: DISPENSED
Start: 2018-05-01

## (undated) RX ORDER — TRANEXAMIC ACID 650 MG/1
TABLET ORAL
Status: DISPENSED
Start: 2021-07-28

## (undated) RX ORDER — CELECOXIB 200 MG/1
CAPSULE ORAL
Status: DISPENSED
Start: 2022-05-31

## (undated) RX ORDER — DEXAMETHASONE SODIUM PHOSPHATE 4 MG/ML
INJECTION, SOLUTION INTRA-ARTICULAR; INTRALESIONAL; INTRAMUSCULAR; INTRAVENOUS; SOFT TISSUE
Status: DISPENSED
Start: 2019-01-21

## (undated) RX ORDER — FENTANYL CITRATE 0.05 MG/ML
INJECTION, SOLUTION INTRAMUSCULAR; INTRAVENOUS
Status: DISPENSED
Start: 2022-12-14

## (undated) RX ORDER — PROPOFOL 10 MG/ML
INJECTION, EMULSION INTRAVENOUS
Status: DISPENSED
Start: 2019-01-21

## (undated) RX ORDER — ONDANSETRON 2 MG/ML
INJECTION INTRAMUSCULAR; INTRAVENOUS
Status: DISPENSED
Start: 2021-07-28

## (undated) RX ORDER — PROPOFOL 10 MG/ML
INJECTION, EMULSION INTRAVENOUS
Status: DISPENSED
Start: 2022-05-31

## (undated) RX ORDER — CELECOXIB 200 MG/1
CAPSULE ORAL
Status: DISPENSED
Start: 2021-07-28

## (undated) RX ORDER — PROPOFOL 10 MG/ML
INJECTION, EMULSION INTRAVENOUS
Status: DISPENSED
Start: 2019-10-04

## (undated) RX ORDER — DEXAMETHASONE SODIUM PHOSPHATE 4 MG/ML
INJECTION, SOLUTION INTRA-ARTICULAR; INTRALESIONAL; INTRAMUSCULAR; INTRAVENOUS; SOFT TISSUE
Status: DISPENSED
Start: 2021-07-28

## (undated) RX ORDER — TRANEXAMIC ACID 10 MG/ML
INJECTION, SOLUTION INTRAVENOUS
Status: DISPENSED
Start: 2020-12-21

## (undated) RX ORDER — ONDANSETRON 2 MG/ML
INJECTION INTRAMUSCULAR; INTRAVENOUS
Status: DISPENSED
Start: 2019-10-04

## (undated) RX ORDER — LIDOCAINE HYDROCHLORIDE 10 MG/ML
INJECTION, SOLUTION EPIDURAL; INFILTRATION; INTRACAUDAL; PERINEURAL
Status: DISPENSED
Start: 2021-07-28

## (undated) RX ORDER — HALOPERIDOL 5 MG/ML
INJECTION INTRAMUSCULAR
Status: DISPENSED
Start: 2019-10-04

## (undated) RX ORDER — FENTANYL CITRATE 50 UG/ML
INJECTION, SOLUTION INTRAMUSCULAR; INTRAVENOUS
Status: DISPENSED
Start: 2022-12-14

## (undated) RX ORDER — BUPIVACAINE HYDROCHLORIDE AND EPINEPHRINE 5; 5 MG/ML; UG/ML
INJECTION, SOLUTION EPIDURAL; INTRACAUDAL; PERINEURAL
Status: DISPENSED
Start: 2020-12-21

## (undated) RX ORDER — PHENYLEPHRINE HCL IN 0.9% NACL 1 MG/10 ML
SYRINGE (ML) INTRAVENOUS
Status: DISPENSED
Start: 2019-10-04

## (undated) RX ORDER — GLYCOPYRROLATE 0.2 MG/ML
INJECTION, SOLUTION INTRAMUSCULAR; INTRAVENOUS
Status: DISPENSED
Start: 2019-01-21

## (undated) RX ORDER — CEFAZOLIN SODIUM 1 G/3ML
INJECTION, POWDER, FOR SOLUTION INTRAMUSCULAR; INTRAVENOUS
Status: DISPENSED
Start: 2020-12-21

## (undated) RX ORDER — GLYCOPYRROLATE 0.2 MG/ML
INJECTION INTRAMUSCULAR; INTRAVENOUS
Status: DISPENSED
Start: 2019-10-04

## (undated) RX ORDER — FENTANYL CITRATE-0.9 % NACL/PF 10 MCG/ML
PLASTIC BAG, INJECTION (ML) INTRAVENOUS
Status: DISPENSED
Start: 2022-05-31

## (undated) RX ORDER — SODIUM CHLORIDE, SODIUM GLUCONATE, SODIUM ACETATE, POTASSIUM CHLORIDE AND MAGNESIUM CHLORIDE 526; 502; 368; 37; 30 MG/100ML; MG/100ML; MG/100ML; MG/100ML; MG/100ML
INJECTION, SOLUTION INTRAVENOUS
Status: DISPENSED
Start: 2019-01-21

## (undated) RX ORDER — PROPOFOL 10 MG/ML
INJECTION, EMULSION INTRAVENOUS
Status: DISPENSED
Start: 2020-12-21

## (undated) RX ORDER — ONDANSETRON 2 MG/ML
INJECTION INTRAMUSCULAR; INTRAVENOUS
Status: DISPENSED
Start: 2022-05-31

## (undated) RX ORDER — CELECOXIB 200 MG/1
CAPSULE ORAL
Status: DISPENSED
Start: 2019-10-04

## (undated) RX ORDER — EPHEDRINE SULFATE 50 MG/ML
INJECTION, SOLUTION INTRAVENOUS
Status: DISPENSED
Start: 2021-07-28

## (undated) RX ORDER — ALBUMIN, HUMAN INJ 5% 5 %
SOLUTION INTRAVENOUS
Status: DISPENSED
Start: 2020-12-21

## (undated) RX ORDER — NEOSTIGMINE METHYLSULFATE 1 MG/ML
VIAL (ML) INJECTION
Status: DISPENSED
Start: 2018-05-01

## (undated) RX ORDER — CEFAZOLIN SODIUM 1 G/3ML
INJECTION, POWDER, FOR SOLUTION INTRAMUSCULAR; INTRAVENOUS
Status: DISPENSED
Start: 2018-05-01

## (undated) RX ORDER — LIDOCAINE HYDROCHLORIDE 10 MG/ML
INJECTION, SOLUTION EPIDURAL; INFILTRATION; INTRACAUDAL; PERINEURAL
Status: DISPENSED
Start: 2019-10-04

## (undated) RX ORDER — DEXAMETHASONE SODIUM PHOSPHATE 4 MG/ML
INJECTION, SOLUTION INTRA-ARTICULAR; INTRALESIONAL; INTRAMUSCULAR; INTRAVENOUS; SOFT TISSUE
Status: DISPENSED
Start: 2022-05-31

## (undated) RX ORDER — CEFAZOLIN SODIUM 2 G/100ML
INJECTION, SOLUTION INTRAVENOUS
Status: DISPENSED
Start: 2020-12-21

## (undated) RX ORDER — FENTANYL CITRATE 50 UG/ML
INJECTION, SOLUTION INTRAMUSCULAR; INTRAVENOUS
Status: DISPENSED
Start: 2019-10-04

## (undated) RX ORDER — PROPOFOL 10 MG/ML
INJECTION, EMULSION INTRAVENOUS
Status: DISPENSED
Start: 2021-07-28

## (undated) RX ORDER — HYDROMORPHONE HCL IN WATER/PF 6 MG/30 ML
PATIENT CONTROLLED ANALGESIA SYRINGE INTRAVENOUS
Status: DISPENSED
Start: 2022-12-14

## (undated) RX ORDER — HEPARIN SODIUM 1000 [USP'U]/ML
INJECTION, SOLUTION INTRAVENOUS; SUBCUTANEOUS
Status: DISPENSED
Start: 2019-01-21

## (undated) RX ORDER — FENTANYL CITRATE 50 UG/ML
INJECTION, SOLUTION INTRAMUSCULAR; INTRAVENOUS
Status: DISPENSED
Start: 2021-07-28

## (undated) RX ORDER — PANTOPRAZOLE SODIUM 40 MG/1
TABLET, DELAYED RELEASE ORAL
Status: DISPENSED
Start: 2019-10-04

## (undated) RX ORDER — PROPOFOL 10 MG/ML
INJECTION, EMULSION INTRAVENOUS
Status: DISPENSED
Start: 2018-05-01

## (undated) RX ORDER — GLYCOPYRROLATE 0.2 MG/ML
INJECTION INTRAMUSCULAR; INTRAVENOUS
Status: DISPENSED
Start: 2022-05-31

## (undated) RX ORDER — FENTANYL CITRATE 50 UG/ML
INJECTION, SOLUTION INTRAMUSCULAR; INTRAVENOUS
Status: DISPENSED
Start: 2020-12-21

## (undated) RX ORDER — FENTANYL CITRATE 50 UG/ML
INJECTION, SOLUTION INTRAMUSCULAR; INTRAVENOUS
Status: DISPENSED
Start: 2019-01-21

## (undated) RX ORDER — KETAMINE HCL IN 0.9 % NACL 50 MG/5 ML
SYRINGE (ML) INTRAVENOUS
Status: DISPENSED
Start: 2019-10-04

## (undated) RX ORDER — FUROSEMIDE 10 MG/ML
INJECTION INTRAMUSCULAR; INTRAVENOUS
Status: DISPENSED
Start: 2019-10-04

## (undated) RX ORDER — HYDROMORPHONE HYDROCHLORIDE 1 MG/ML
INJECTION, SOLUTION INTRAMUSCULAR; INTRAVENOUS; SUBCUTANEOUS
Status: DISPENSED
Start: 2019-10-04

## (undated) RX ORDER — ONDANSETRON 2 MG/ML
INJECTION INTRAMUSCULAR; INTRAVENOUS
Status: DISPENSED
Start: 2019-01-21

## (undated) RX ORDER — GLYCOPYRROLATE 0.2 MG/ML
INJECTION INTRAMUSCULAR; INTRAVENOUS
Status: DISPENSED
Start: 2020-12-21

## (undated) RX ORDER — PROMETHAZINE HYDROCHLORIDE 25 MG/ML
INJECTION, SOLUTION INTRAMUSCULAR; INTRAVENOUS
Status: DISPENSED
Start: 2021-07-28

## (undated) RX ORDER — NEOSTIGMINE METHYLSULFATE 1 MG/ML
VIAL (ML) INJECTION
Status: DISPENSED
Start: 2019-01-21

## (undated) RX ORDER — VECURONIUM BROMIDE 1 MG/ML
INJECTION, POWDER, LYOPHILIZED, FOR SOLUTION INTRAVENOUS
Status: DISPENSED
Start: 2019-01-21

## (undated) RX ORDER — CLOPIDOGREL BISULFATE 75 MG/1
TABLET ORAL
Status: DISPENSED
Start: 2019-01-21

## (undated) RX ORDER — FENTANYL CITRATE 50 UG/ML
INJECTION, SOLUTION INTRAMUSCULAR; INTRAVENOUS
Status: DISPENSED
Start: 2018-05-01

## (undated) RX ORDER — HYDROCODONE BITARTRATE AND ACETAMINOPHEN 5; 325 MG/1; MG/1
TABLET ORAL
Status: DISPENSED
Start: 2022-12-14

## (undated) RX ORDER — GLYCOPYRROLATE 0.2 MG/ML
INJECTION, SOLUTION INTRAMUSCULAR; INTRAVENOUS
Status: DISPENSED
Start: 2018-05-01

## (undated) RX ORDER — BUPIVACAINE HYDROCHLORIDE 2.5 MG/ML
INJECTION, SOLUTION EPIDURAL; INFILTRATION; INTRACAUDAL
Status: DISPENSED
Start: 2021-07-28

## (undated) RX ORDER — DEXAMETHASONE SODIUM PHOSPHATE 4 MG/ML
INJECTION, SOLUTION INTRA-ARTICULAR; INTRALESIONAL; INTRAMUSCULAR; INTRAVENOUS; SOFT TISSUE
Status: DISPENSED
Start: 2018-05-01

## (undated) RX ORDER — ONDANSETRON 2 MG/ML
INJECTION INTRAMUSCULAR; INTRAVENOUS
Status: DISPENSED
Start: 2018-05-01

## (undated) RX ORDER — FENTANYL CITRATE-0.9 % NACL/PF 10 MCG/ML
PLASTIC BAG, INJECTION (ML) INTRAVENOUS
Status: DISPENSED
Start: 2020-12-21

## (undated) RX ORDER — CEFAZOLIN SODIUM 2 G/100ML
INJECTION, SOLUTION INTRAVENOUS
Status: DISPENSED
Start: 2019-10-04

## (undated) RX ORDER — FENTANYL CITRATE 50 UG/ML
INJECTION, SOLUTION INTRAMUSCULAR; INTRAVENOUS
Status: DISPENSED
Start: 2022-05-31

## (undated) RX ORDER — ASPIRIN 81 MG/1
TABLET ORAL
Status: DISPENSED
Start: 2019-01-21

## (undated) RX ORDER — SCOLOPAMINE TRANSDERMAL SYSTEM 1 MG/1
PATCH, EXTENDED RELEASE TRANSDERMAL
Status: DISPENSED
Start: 2022-12-14

## (undated) RX ORDER — EPHEDRINE SULFATE 50 MG/ML
INJECTION, SOLUTION INTRAMUSCULAR; INTRAVENOUS; SUBCUTANEOUS
Status: DISPENSED
Start: 2019-10-04

## (undated) RX ORDER — VANCOMYCIN HYDROCHLORIDE 1 G/20ML
INJECTION, POWDER, LYOPHILIZED, FOR SOLUTION INTRAVENOUS
Status: DISPENSED
Start: 2021-07-28

## (undated) RX ORDER — ONDANSETRON 2 MG/ML
INJECTION INTRAMUSCULAR; INTRAVENOUS
Status: DISPENSED
Start: 2022-12-14

## (undated) RX ORDER — CEFAZOLIN SODIUM 2 G/100ML
INJECTION, SOLUTION INTRAVENOUS
Status: DISPENSED
Start: 2021-07-28

## (undated) RX ORDER — CEFAZOLIN SODIUM 1 G/3ML
INJECTION, POWDER, FOR SOLUTION INTRAMUSCULAR; INTRAVENOUS
Status: DISPENSED
Start: 2019-10-04

## (undated) RX ORDER — TRANEXAMIC ACID 650 MG/1
TABLET ORAL
Status: DISPENSED
Start: 2022-05-31

## (undated) RX ORDER — LIDOCAINE HYDROCHLORIDE 10 MG/ML
INJECTION, SOLUTION EPIDURAL; INFILTRATION; INTRACAUDAL; PERINEURAL
Status: DISPENSED
Start: 2020-12-21

## (undated) RX ORDER — CEFAZOLIN SODIUM 2 G/100ML
INJECTION, SOLUTION INTRAVENOUS
Status: DISPENSED
Start: 2019-01-21

## (undated) RX ORDER — LIDOCAINE HYDROCHLORIDE 20 MG/ML
INJECTION, SOLUTION EPIDURAL; INFILTRATION; INTRACAUDAL; PERINEURAL
Status: DISPENSED
Start: 2019-01-21

## (undated) RX ORDER — BUPIVACAINE HYDROCHLORIDE 2.5 MG/ML
INJECTION, SOLUTION EPIDURAL; INFILTRATION; INTRACAUDAL
Status: DISPENSED
Start: 2022-05-31

## (undated) RX ORDER — SCOLOPAMINE TRANSDERMAL SYSTEM 1 MG/1
PATCH, EXTENDED RELEASE TRANSDERMAL
Status: DISPENSED
Start: 2020-12-21

## (undated) RX ORDER — PROPOFOL 10 MG/ML
INJECTION, EMULSION INTRAVENOUS
Status: DISPENSED
Start: 2022-12-14

## (undated) RX ORDER — NEOSTIGMINE METHYLSULFATE 1 MG/ML
VIAL (ML) INJECTION
Status: DISPENSED
Start: 2019-10-04

## (undated) RX ORDER — VANCOMYCIN HYDROCHLORIDE 1 G/20ML
INJECTION, POWDER, LYOPHILIZED, FOR SOLUTION INTRAVENOUS
Status: DISPENSED
Start: 2022-05-31

## (undated) RX ORDER — DEXAMETHASONE SODIUM PHOSPHATE 4 MG/ML
INJECTION, SOLUTION INTRA-ARTICULAR; INTRALESIONAL; INTRAMUSCULAR; INTRAVENOUS; SOFT TISSUE
Status: DISPENSED
Start: 2019-10-04

## (undated) RX ORDER — PAPAVERINE HYDROCHLORIDE 30 MG/ML
INJECTION INTRAMUSCULAR; INTRAVENOUS
Status: DISPENSED
Start: 2019-01-21

## (undated) RX ORDER — FENTANYL CITRATE 50 UG/ML
INJECTION, SOLUTION INTRAMUSCULAR; INTRAVENOUS
Status: DISPENSED
Start: 2021-12-10

## (undated) RX ORDER — DEXAMETHASONE SODIUM PHOSPHATE 4 MG/ML
INJECTION, SOLUTION INTRA-ARTICULAR; INTRALESIONAL; INTRAMUSCULAR; INTRAVENOUS; SOFT TISSUE
Status: DISPENSED
Start: 2020-12-21

## (undated) RX ORDER — GLYCOPYRROLATE 0.2 MG/ML
INJECTION, SOLUTION INTRAMUSCULAR; INTRAVENOUS
Status: DISPENSED
Start: 2022-12-14

## (undated) RX ORDER — CEFAZOLIN SODIUM/WATER 2 G/20 ML
SYRINGE (ML) INTRAVENOUS
Status: DISPENSED
Start: 2022-05-31

## (undated) RX ORDER — ONDANSETRON 2 MG/ML
INJECTION INTRAMUSCULAR; INTRAVENOUS
Status: DISPENSED
Start: 2020-12-21